# Patient Record
Sex: FEMALE | Race: ASIAN | Employment: OTHER | URBAN - METROPOLITAN AREA
[De-identification: names, ages, dates, MRNs, and addresses within clinical notes are randomized per-mention and may not be internally consistent; named-entity substitution may affect disease eponyms.]

---

## 2017-01-04 ENCOUNTER — OFFICE VISIT (OUTPATIENT)
Dept: PHARMACY | Facility: CLINIC | Age: 72
End: 2017-01-04
Payer: COMMERCIAL

## 2017-01-04 ENCOUNTER — OFFICE VISIT (OUTPATIENT)
Dept: FAMILY MEDICINE | Facility: CLINIC | Age: 72
End: 2017-01-04
Payer: COMMERCIAL

## 2017-01-04 VITALS — OXYGEN SATURATION: 99 % | SYSTOLIC BLOOD PRESSURE: 110 MMHG | HEART RATE: 65 BPM | DIASTOLIC BLOOD PRESSURE: 58 MMHG

## 2017-01-04 DIAGNOSIS — Z94.0 KIDNEY REPLACED BY TRANSPLANT: ICD-10-CM

## 2017-01-04 DIAGNOSIS — E66.01 MORBID OBESITY, UNSPECIFIED OBESITY TYPE (H): Primary | ICD-10-CM

## 2017-01-04 DIAGNOSIS — N39.0 RECURRENT UTI: ICD-10-CM

## 2017-01-04 DIAGNOSIS — G89.29 CHRONIC SHOULDER PAIN, UNSPECIFIED LATERALITY: ICD-10-CM

## 2017-01-04 DIAGNOSIS — M25.519 CHRONIC SHOULDER PAIN, UNSPECIFIED LATERALITY: ICD-10-CM

## 2017-01-04 DIAGNOSIS — E11.42 DIABETIC POLYNEUROPATHY ASSOCIATED WITH TYPE 2 DIABETES MELLITUS (H): ICD-10-CM

## 2017-01-04 DIAGNOSIS — E11.42 TYPE 2 DIABETES MELLITUS WITH DIABETIC POLYNEUROPATHY, WITH LONG-TERM CURRENT USE OF INSULIN (H): Primary | ICD-10-CM

## 2017-01-04 DIAGNOSIS — I10 BENIGN ESSENTIAL HYPERTENSION: ICD-10-CM

## 2017-01-04 DIAGNOSIS — E78.5 HYPERLIPIDEMIA LDL GOAL <100: ICD-10-CM

## 2017-01-04 DIAGNOSIS — Z79.4 TYPE 2 DIABETES MELLITUS WITH DIABETIC POLYNEUROPATHY, WITH LONG-TERM CURRENT USE OF INSULIN (H): Primary | ICD-10-CM

## 2017-01-04 PROCEDURE — 99350 HOME/RES VST EST HIGH MDM 60: CPT | Performed by: NURSE PRACTITIONER

## 2017-01-04 PROCEDURE — 99606 MTMS BY PHARM EST 15 MIN: CPT | Performed by: PHARMACIST

## 2017-01-04 PROCEDURE — 99607 MTMS BY PHARM ADDL 15 MIN: CPT | Performed by: PHARMACIST

## 2017-01-04 NOTE — MR AVS SNAPSHOT
After Visit Summary   1/4/2017    Gem Jade    MRN: [id number]           Patient Information     Date Of Birth          1945        Visit Information        Provider Department      1/4/2017 2:00 PM Marivel Barcenas APRN CNP [facility]        Today's Diagnoses     Morbid obesity, unspecified obesity type (H)    -  1     Diabetic polyneuropathy associated with type 2 diabetes mellitus (H)         Chronic shoulder pain, unspecified laterality         Benign essential hypertension         Recurrent UTI         Kidney replaced by transplant           Care Instructions    Please remember to take your 5U of short acting insulin before dinner     Please continue to work on your diet     Continue your bed exercises as tolerated     We will see you again in one month         Follow-ups after your visit        Follow-up notes from your care team     Return in about 1 month (around 2/4/2017).      Your next 10 appointments already scheduled     Feb 01, 2017  2:00 PM   Return Visit with ANTHONY Antonio CNP   [facility] ([facility]    [address] [place] [address]  [address] [place] [id number] [phone]            Feb 01, 2017  2:00 PM   Office Visit with Conchita Toledo [facility] ([facility])    [address] [place] [id number] [phone]           Bring a current list of meds and any records pertaining to this visit.  For Physicals, please bring immunization records and any forms needing to be filled out.  Please arrive 10 minutes early to complete paperwork.            Mar 01, 2017  2:00 PM   Return Visit with ANTHONY Antonio CNP   [facility] ([facility]    [address] [place] [address]  [address] [place] [id number] [phone]              Who to contact     If you have  "questions or need follow up information about today's clinic visit or your schedule please contact [facility] directly at [phone].  Normal or non-critical lab and imaging results will be communicated to you by [employer], letter or phone within 4 business days after the clinic has received the results. If you do not hear from us within 7 days, please contact the clinic through [employer] or phone. If you have a critical or abnormal lab result, we will notify you by phone as soon as possible.  Submit refill requests through [employer] or call your pharmacy and they will forward the refill request to us. Please allow 3 business days for your refill to be completed.          Additional Information About Your Visit        [employer] Information     [employer] lets you send messages to your doctor, view your test results, renew your prescriptions, schedule appointments and more. To sign up, go to [email or web address] . Click on \"Log in\" on the left side of the screen, which will take you to the Welcome page. Then click on \"Sign up Now\" on the right side of the page.     You will be asked to enter the access code listed below, as well as some personal information. Please follow the directions to create your username and password.     Your access code is: [email or web address]  Expires: 2017  8:50 AM     Your access code will  in 90 days. If you need help or a new code, please call your [place] clinic or [phone].        Care EveryWhere ID     This is your Care EveryWhere ID. This could be used by other organizations to access your [facility] medical records  [id number]        Your Vitals Were     Pulse Pulse Oximetry                65 99%           Blood Pressure from Last 3 Encounters:   17 110/58   16 110/70   10/19/16 110/58    Weight from Last 3 Encounters:   16 201 lb 8 oz (91.4 kg)   16 200 lb (90.719 kg)   16 198 lb (89.812 kg)              Today, you had the following "     No orders found for display       Primary Care Provider Office Phone # Fax #    ANTHONY Antonio [facility] [phone]       [facility] [address] [place], [place] [address]E [facility] [address] [place] [id number]        Thank you!     Thank you for choosing [facility]  for your care. Our goal is always to provide you with excellent care. Hearing back from our patients is one way we can continue to improve our services. Please take a few minutes to complete the written survey that you may receive in the mail after your visit with us. Thank you!             Your Updated Medication List - Protect others around you: Learn how to safely use, store and throw away your medicines at www.disposemymeds.org.          This list is accurate as of: 1/4/17 11:59 PM.  Always use your most recent med list.                   Brand Name Dispense Instructions for use    * ACE NOT PRESCRIBED (INTENTIONAL)      1 each continuous prn ACE Inhibitor not prescribed due to Worsening renal function on ACE/ARB therapy       ACETAMINOPHEN PO      Take 325-650 mg by mouth every 4 hours as needed.       alcohol swab prep pads     1 Box    Use to swab area of injection/yvon and vials as directed.       ASPIRIN PO      Take 81 mg by mouth daily.       benzonatate 100 MG capsule    TESSALON    50 capsule    Take 1 capsule (100 mg) by mouth 3 times daily as needed for cough       blood glucose monitoring test strip    ACCU-CHEK SMARTVIEW    1 Box    Use to test blood sugar 3 times daily or as directed.       calcium-vitamin D 600-400 MG-UNIT per tablet    CALTRATE     Take 1 tablet by mouth 2 times daily       cloNIDine 0.1 MG tablet    CATAPRES    180 tablet    Take 1 tablet (0.1 mg) by mouth 2 times daily       clotrimazole 1 % cream    LOTRIMIN    40 g    Apply topically 2 times daily Apply to buttocks twice a day until rash resolved.       Cranberry 400 MG Tabs      Take 400 mg by mouth 2 times daily        cyanocobalamin 1000 MCG/ML injection    VITAMIN B12    3 mL    Inject 1 mL (1,000 mcg) into the muscle every 30 days       famotidine 20 MG tablet    PEPCID    60 tablet    Take 1 tablet (20 mg) by mouth 2 times daily       gabapentin 300 MG capsule    NEURONTIN    540 capsule    Take 3 tabs 2x daily       GLUCAGON EMERGENCY 1 MG kit   Generic drug:  glucagon     1 each    Inject 1 mg into the muscle once       insulin glargine 100 UNIT/ML injection    LANTUS SOLOSTAR    15 mL    Inject 14 units under the skin daily.       insulin pen needle 30G X 8 MM     100 each    Use as directed with Lantus       labetalol 200 MG tablet    NORMODYNE    180 tablet    Take 1 tablet (200 mg) by mouth 2 times daily       lactobacillus rhamnosus (GG) capsule     60 capsule    Take 1 capsule by mouth 2 times daily       levothyroxine 50 MCG tablet    SYNTHROID    90 tablet    Take 1 tablet (50 mcg) by mouth daily       loperamide 2 MG capsule    IMODIUM    60 capsule    Take 1 capsule (2 mg) by mouth 3 times daily as needed for diarrhea       loratadine 10 MG tablet    CLARITIN    90 tablet    Take 1 tablet (10 mg) by mouth daily       NovoLOG FLEXPEN 100 UNIT/ML injection   Generic drug:  insulin aspart     15 mL    Inject 5 units before meals BID. At bedtime use following sliding scale:  150-200 5u 201-250 7u 251-300 9u 301-350 11u 351+ 13u       NYSTOP 903402 UNIT/GM Powd powder     60 g    Externally apply 1 g topically 3 times daily as needed       ondansetron 4 MG ODT tab    ZOFRAN ODT    20 tablet    Take 1-2 tablets (4-8 mg) by mouth every 8 hours as needed for nausea       * order for DME     3 catheter    Equipment being ordered: [employer] Catheters - 18 french       * order for DME     100 pad    Equipment being ordered: [employer]ux Please send to [facility]       * order for DME     1 each    Equipment being ordered: [employer] FX CPAP interface (nasal pillows), size XS.       OXcarbazepine 300 MG tablet    TRILEPTAL    60 tablet  "   Take 1 tablet (300 mg) by mouth 2 times daily       phenazopyridine 100 MG tablet    PYRIDIUM    6 tablet    Take 1 tablet (100 mg) by mouth 3 times daily as needed for irritation       pravastatin 10 MG tablet    PRAVACHOL    90 tablet    Take 1 tablet (10 mg) by mouth daily       predniSONE 2.5 MG tablet    DELTASONE    30 tablet    Take 1 tablet (2.5 mg) by mouth daily       simethicone 125 MG Chew chewable tablet    MYLICON    120 tablet    Take 1 tablet (125 mg) by mouth as needed for intestinal gas       * sirolimus Tabs tablet     30 tablet    Take 1 tablet (1 mg) by mouth daily GENERIC (total dose 1.5 mg daily)       * sirolimus Tabs tablet     30 tablet    Take 1 tablet (0.5 mg) by mouth daily GENERIC (1.5 mg daily)       syringe/needle (disp) 22G X 1-1/2\" 3 ML Misc    B-D SYRINGE/NEEDLE    50 each    Inject 1 Syringe into the muscle every 30 days       * tacrolimus 0.5 MG capsule    PROGRAF - GENERIC EQUIVALENT    60 capsule    HOLD       * tacrolimus 1 MG capsule    PROGRAF - GENERIC EQUIVALENT    120 capsule    Take 2 capsules (2 mg) by mouth 2 times daily       TraMADol HCl 50 MG Tbdp     90 tablet    Take 50 mg by mouth 3 times daily as needed       vitamin D 2000 UNITS tablet     100 tablet    Take 4,000 Units by mouth daily       * Notice:  This list has 8 medication(s) that are the same as other medications prescribed for you. Read the directions carefully, and ask your doctor or other care provider to review them with you.      "

## 2017-01-04 NOTE — PROGRESS NOTES
SUBJECTIVE/OBJECTIVE:                                                    Gem Jade is a 71 year old female seen at their home for a follow-up visit for Medication Therapy Management.  She was referred to me from Marivel Barcenas. Seen in a covisit with PCP.    Chief Complaint: Follow up from our visit on 11/16/16.  Catheter leaking (see PCP note)  Tobacco: History of tobacco dependence - quit     Alcohol: not currently using    Medication Adherence: no issues reported and has assistance setting up med boxes (Keshawn)    Diabetes:  Pt currently taking Lantus 14u daily, Novolog 5u for every 50 over 150. Uses 7-11u 1 time a day. Has not remembered to give Novolog 5u with meals.  SMBG: one time daily after eating and prior to Novolog.  Ranges (glucometer):   Date FBG/ 2hours post Lunch/2hours post Dinner /2hours post bedtime   today       yesterday    439       236       263       289       289       308      Symptoms of low blood sugar? none. Frequency of  hypoglycemia? rarely.  Recent symptoms of high blood sugar? none  Aspirin: Taking 81mg daily and denies side effects  Diet/Exercise: Appetite has improved, has advanced to her regular diet. Has been feeling good. Eating more sweets over the holidays. Usually eating dinner as main meal and snacks, not eating breakfast often. Wakes up at 12pm usually.  Hx kidney transplant: currently taking Prograf 2mg BID, prednisone 2.5mg daily, Rapamune 1.5mg daily. Blood levels have been regularly followed by [facility] transplant clinic and Keshawn appears to be following changes in dosing instructions correctly. Denies SE.  Hypertension: Current medications include labetalol 200mg BID and clonidine 0.1mg BID.  Patient has BP monitored by [facility]. States BP has been consistently 115-120 systolic and 80s diastolic.  Patient reports no current medication side effects.  Hyperlipidemia: Current therapy includes pravastatin 10mg once daily.  Pt reports no significant myalgias or other  side effects. Has history of severe myalgias with atorvastatin.   Shoulder pain: currently taking tramadol once a day which is working for shoulder pain. Also using Icy Hot which is helpful.     Current labs include:  BP Readings from Last 3 Encounters:   01/04/17 110/58   11/16/16 110/70   10/19/16 110/58     A1C      7.4   12/5/2016.  CHOL      164   12/5/2016  TRIG      272   12/5/2016  HDL       46   12/5/2016  LDL       64   12/5/2016    Liver Function Studies -   Recent Labs   Lab Test  09/05/16   0617   PROTTOTAL  7.3   ALBUMIN  2.6*   BILITOTAL  0.9   ALKPHOS  53   AST  15   ALT  10       Lab Results   Component Value Date    UCRR 42 03/18/2014    MICROL 68 06/07/2013    UMALCR 194.29* 06/07/2013       Last Basic Metabolic Panel:  NA      138   11/14/2016   POTASSIUM      4.1   11/14/2016  CHLORIDE      102   11/14/2016  BUN       22   11/14/2016  CR     0.86   11/14/2016  GFR ESTIMATE   Date Value Ref Range Status   11/14/2016 66 >60 mL/min/1.7m2 Final     Comment:     Non  GFR Calc   10/17/2016 63 >60 mL/min/1.7m2 Final     Comment:     Non  GFR Calc   10/10/2016 73 >60 mL/min/1.7m2 Final     Comment:     Non  GFR Calc     GFR ESTIMATE IF BLACK   Date Value Ref Range Status   11/14/2016 79 >60 mL/min/1.7m2 Final     Comment:      GFR Calc   10/17/2016 76 >60 mL/min/1.7m2 Final     Comment:      GFR Calc   10/10/2016 88 >60 mL/min/1.7m2 Final     Comment:      GFR Calc     TSH   Date Value Ref Range Status   08/30/2016 1.01 0.40 - 4.00 mU/L Final   ]    Most Recent Immunizations   Administered Date(s) Administered     Hepatitis B 01/13/2003     Influenza (H1N1) 11/25/2009     Influenza (High Dose) 3 valent vaccine 10/07/2016     Influenza (IIV3) 11/05/2011     Influenza Vaccine IM 3yrs+ 4 Valent IIV4 10/16/2013     Pneumococcal (PCV 13) 12/01/2015     Pneumococcal 23 valent 06/17/2013     TDAP (ADACEL AGES 11-64)  06/17/2013     Zoster vaccine, live 06/17/2013     ASSESSMENT:                                                    Current medications were reviewed today.      Medication Adherence: needs improvement - see below    Diabetes: needs improvement. A1c at goal <8%. PPGs not at goal <180. Pt would benefit from taking Novolog 5 units consistently with dinner. Also would be helpful to have fasting BS readings to assess need for Lantus dose changes.  Hx kidney transplant: stable. Continue to follow-up with transplant clinic.  Hypertension: stable. BP at goal <140/90.  Hyperlipidemia: stable. Pt is not on a moderate-intensity statin but has a history of tolerating statins at higher doses. LDL is less than 70, will continue with low intensity statin.  Shoulder pain: stable     PLAN:                                                      Check blood sugar fasting before eating  Take Novolog 5 units with dinner    I spent 50 minutes with this patient today.  All changes were made via collaborative practice agreement with Marivel Barcenas. A copy of the visit note was provided to the patient's primary care provider.     Will follow up in 1 month.    The patient was mailed a summary of these recommendations as an after visit summary.    Conchita Toledo, PharmD, BCACP

## 2017-01-09 DIAGNOSIS — Z79.60 LONG-TERM USE OF IMMUNOSUPPRESSANT MEDICATION: ICD-10-CM

## 2017-01-09 DIAGNOSIS — Z94.0 KIDNEY TRANSPLANT RECIPIENT: ICD-10-CM

## 2017-01-09 DIAGNOSIS — D84.9 IMMUNOSUPPRESSED STATUS (H): ICD-10-CM

## 2017-01-09 LAB
ANION GAP SERPL CALCULATED.3IONS-SCNC: 8 MMOL/L (ref 3–14)
BUN SERPL-MCNC: 42 MG/DL (ref 7–30)
CALCIUM SERPL-MCNC: 9.5 MG/DL (ref 8.5–10.1)
CHLORIDE SERPL-SCNC: 104 MMOL/L (ref 94–109)
CO2 SERPL-SCNC: 25 MMOL/L (ref 20–32)
CREAT SERPL-MCNC: 1.37 MG/DL (ref 0.52–1.04)
ERYTHROCYTE [DISTWIDTH] IN BLOOD BY AUTOMATED COUNT: 13.5 % (ref 10–15)
GFR SERPL CREATININE-BSD FRML MDRD: 38 ML/MIN/1.7M2
GLUCOSE SERPL-MCNC: 150 MG/DL (ref 70–99)
HCT VFR BLD AUTO: 34.8 % (ref 35–47)
HGB BLD-MCNC: 11 G/DL (ref 11.7–15.7)
MCH RBC QN AUTO: 29.9 PG (ref 26.5–33)
MCHC RBC AUTO-ENTMCNC: 31.6 G/DL (ref 31.5–36.5)
MCV RBC AUTO: 95 FL (ref 78–100)
PLATELET # BLD AUTO: 109 10E9/L (ref 150–450)
POTASSIUM SERPL-SCNC: 4.6 MMOL/L (ref 3.4–5.3)
RBC # BLD AUTO: 3.68 10E12/L (ref 3.8–5.2)
SODIUM SERPL-SCNC: 137 MMOL/L (ref 133–144)
WBC # BLD AUTO: 4 10E9/L (ref 4–11)

## 2017-01-09 PROCEDURE — 85027 COMPLETE CBC AUTOMATED: CPT | Performed by: FAMILY MEDICINE

## 2017-01-09 PROCEDURE — 99000 SPECIMEN HANDLING OFFICE-LAB: CPT | Performed by: FAMILY MEDICINE

## 2017-01-09 PROCEDURE — 36415 COLL VENOUS BLD VENIPUNCTURE: CPT | Performed by: FAMILY MEDICINE

## 2017-01-09 PROCEDURE — 80048 BASIC METABOLIC PNL TOTAL CA: CPT | Performed by: FAMILY MEDICINE

## 2017-01-09 PROCEDURE — 80195 ASSAY OF SIROLIMUS: CPT | Mod: 90 | Performed by: FAMILY MEDICINE

## 2017-01-09 PROCEDURE — 80197 ASSAY OF TACROLIMUS: CPT | Mod: 90 | Performed by: FAMILY MEDICINE

## 2017-01-09 NOTE — PATIENT INSTRUCTIONS
Please remember to take your 5U of short acting insulin before dinner     Please continue to work on your diet     Continue your bed exercises as tolerated     We will see you again in one month

## 2017-01-09 NOTE — PROGRESS NOTES
SUBJECTIVE:                                                    Gem Jade is a 68 year old female who is seen in her home due to inability to leave the home unassisted, morbid obesity, severe vertigo, generalized weakness, chronic pain, bed bound, rarely able to get up in the , seen today for the following health issues: Seen with  , and PharmD, recent hospitalization 9/9/16 for , Pancytopenia.  2. Acute encephalopathy.  3. Acute kidney injury, kidney transplant team involved and med changes made    KIDNEY TRANSPLANT      Duration: right kidney replaced, left one non functional     Description (location/character/radiation): chronic managed by kidney transplant team     Intensity:  moderate    Accompanying signs and symptoms: fatigue,   History (similar episodes/previous evaluation):   Progress Notes      Jorgito Jacinto RN at 11/15/2016  4:21 PM      Status: Signed         Expand All Collapse All    ISSUE:  - tacrolimus level <3.0, target 3-5  - sirolimus level 6.2, target 4-6  - tacrolimus + sirolimus = 8    PLAN:  - increase tacrolimus dose from 1.5 mg twice daily to 2 mg twice daily  - decrease sirolimus dose from 2 mg daily to 1.5 mg daily                     Precipitating or alleviating factors: DM, bed bound     Therapies tried and outcome: see transplant team notes, no med changes since 11/16         Diabetes Follow-up    Patient is checking blood sugars: once daily.  Results are as follows: 200-400's         noon- fasting, not taking non fasting sugars     Diabetic concerns: frequent infections and other -  elevated evening sugars, not checking non  fasting sugars, taking SS novolog, and PharmD is now having her take 5U at dinner,  and the SS scale with her other meals and lantus 14u now at bedtime, only eating 1x a day eating a lot beatrice cookies and cheesecake but no more Ramen          Symptoms of hypoglycemia (low blood sugar): shaky, weak, lethargy, confusion, Symptoms occur if  sugars < below 50.     Paresthesias (numbness or burning in feet) or sores: Yes hands and feet, using gabapentin with good relief,     Date of last diabetic eye exam: unsure                Amount of exercise or physical activity: None, bed bound, no longer getting up in her wheel chair, worked with PT on core strengthening and turning without using arms, able to turn herself now but now having shoulder pain again so has pulled back on turning as much with her arms      Problems taking medications regularly: No,  set them up and adm them    Medication side effects: none  Diet: diabetic, appetite has decreased and only eating 1x a day, eating more dessert lately due to the [employer]       Musculoskeletal problem/pain      Duration: Chronic, but stable with stopping baclofen, shoulder pain is a little worse now due to turning herself     Description  Location: shoulders, left the worse     Intensity:  Moderate,2-7 /10    Accompanying signs and symptoms: numbness and weakness of in UE    History  Previous similar problem: YES  Previous evaluation:  x-ray, MRI and orthopedic evaluation  RIGHT SHOULDER THREE VIEWS 5/19/2013 2:23 PM  HISTORY: Rule out fracture.  COMPARISON: 9/14/2012.  FINDINGS: Again seen are degenerative changes of the  acromioclavicular joint. The right shoulder is otherwise unremarkable  and unchanged.    Precipitating or alleviating factors:  Trauma or overuse: YES- uses her arms to turn herself in bed  Aggravating factors include: any use of UE  Therapies tried and outcome: no longer using acetaminophen, using icy hot gel and tramadol 1x daily, working on core strength, still using chinese patches and working well            Hypertension Follow-up      Outpatient blood pressures are being checked by [facility], stable    Low Salt Diet: not monitoring salt         Diarrhea  Duration of complaint: no incidents of diarrhea now, had it for few days after starting Siroilmus but nothing now for several  months      UTI - Female  Duration of complaint: increase in leaking around the cath, changed last night at 2am, leaking improved but now is bad again, TC to [facility] and they will be out at 4pm today to change it again,        Problem list and histories reviewed & adjusted, as indicated.  Additional history: as documented    Recent Labs   Lab Test  12/05/16   1210  11/14/16   1200  10/17/16   1000   09/05/16   0617  09/04/16   0815   08/30/16   1709   08/30/16   0032   06/13/16   1140   05/26/16   0744   12/20/15   0853   02/05/15   1250   05/16/13   1240   A1C  7.4*   --    --    --    --    --    --    --    --    --    --   6.7*   --    --    --   5.5   < >  6.9*   < >  7.2*   LDL  64   --    --    --    --    --    --    --    --    --    --    --    --    --    --    --    --   18   --   149*   HDL  46*   --    --    --    --    --    --    --    --    --    --    --    --    --    --    --    --   43*   --   42*   TRIG  272*   --    --    --    --    --    --    --    --    --    --    --    --    --    --    --    --   223*   --   262*   ALT   --    --    --    --   10  10   --   8   --   6   < >   --    --    --    < >   --    < >   --    < >   --    CR   --   0.86  0.89   < >  0.54  0.58   < >  1.38*   < >   --    < >   --    < >  0.70   < >  0.74   < >  0.67   < >  0.70   GFRESTIMATED   --   66  63   < >  >90  Non  GFR Calc    >90  Non  GFR Calc     < >  38*   < >   --    < >   --    < >  83   < >  78   < >  87   < >  84   GFRESTBLACK   --   79  76   < >  >90   GFR Calc    >90   GFR Calc     < >  46*   < >   --    < >   --    < >  >90   GFR Calc     < >  >90   GFR Calc     < >  >90   GFR Calc     < >  >90   POTASSIUM   --   4.1  4.4   < >  4.0  4.0   < >  4.3   < >   --    < >   --    < >  3.5   < >  3.3*   < >  4.3   < >  3.9   TSH   --    --    --    --    --    --    --    --    --   1.01   --     --    --   0.96   --    --    --    --    < >   --     < > = values in this interval not displayed.      BP Readings from Last 3 Encounters:   01/04/17 110/58   11/16/16 110/70   10/19/16 110/58    Wt Readings from Last 3 Encounters:   09/03/16 201 lb 8 oz (91.4 kg)   08/09/16 200 lb (90.719 kg)   06/22/16 198 lb (89.812 kg)            Labs reviewed in [facility]  Problem list, Medication list, Allergies, and Medical/Social/Surgical histories reviewed in [facility] and updated as appropriate.    ROS:  CONSTITUTIONAL:NEGATIVE for fever, chills, change in weight and fatigue  INTEGUMENTARY/SKIN: NEGATIVE for worrisome rashes, moles or lesions  R: NEGATIVE for significant cough or SOB  CV: NEGATIVE for chest pain, palpitations or peripheral edema  MUSCULOSKELETAL: POSITIVE  for muscle weakness generalized and hx of  shoulder and back pain  PSYCHIATRIC: NEGATIVE for concentration difficulty, Hx anxiety, fatigue, impaired memory but back to baseline    OBJECTIVE:                                                    /58 mmHg  Pulse 65  SpO2 99%  There is no weight on file to calculate BMI.  GENERAL: alert, mild distress, obese, fatigued and laying in bed, engaged in interview,   RESP: lungs clear to auscultation - no rales, rhonchi or wheezes  CV: regular rate and rhythm, normal S1 S2, no S3 or S4, no murmur, click or rub, no peripheral edema  ABDOMEN: soft, nontender, no masses and bowel sounds diminished  MS: no gross musculoskeletal defects noted, no edema, limited ROM in both shoulders  SKIN: no suspicious lesions or rashes  NEURO: Decreased strength and tone, mentation intact and speech normal  PSYCH: mentation appears normal, affect normal/bright, very verbose    Diagnostic Test Results:  Results for orders placed or performed in visit on 12/05/16   Hemoglobin A1c   Result Value Ref Range    Hemoglobin A1C 7.4 (H) 4.3 - 6.0 %   Lipid panel reflex to direct LDL   Result Value Ref Range    Cholesterol 164 <200 mg/dL     Triglycerides 272 (H) <150 mg/dL    HDL Cholesterol 46 (L) >49 mg/dL    LDL Cholesterol Calculated 64 <100 mg/dL    Non HDL Cholesterol 118 <130 mg/dL     *Note: Due to a large number of results and/or encounters for the requested time period, some results have not been displayed. A complete set of results can be found in Results Review.        ASSESSMENT/PLAN:                                                    ASSESSMENT / PLAN:  (E66.01) Morbid obesity, unspecified obesity type (H)  (primary encounter diagnosis)  Comment: improved but still not enough core strength to get up in her chair   Plan: discussed diet and exercise     (E11.42) Diabetic polyneuropathy associated with type 2 diabetes mellitus (H)  Comment: RBG worse due to diet and not taking 5U Novolog  at dinner time   Plan: will work on diet and take her 5U regularly before dinner     (M25.519,  G89.29) Chronic shoulder pain, unspecified laterality  Comment: slight increase but has not had to increase her pain meds  Plan: continue tramadol 1x daily and icy hot and chinese patches     (I10) Benign essential hypertension  Comment: stable   Plan: no med changes     (N39.0) Recurrent UTI  Comment: troubles with leaking cath but no other UTI sx  Plan: [facility] will come and replace cath today    (Z94.0) Kidney replaced by transplant  Comment: stable on meds   Plan: follow up with transplant team     Patient Instructions   Please remember to take your 5U of short acting insulin before dinner     Please continue to work on your diet     Continue your bed exercises as tolerated     We will see you again in one month       Marivel Barcenas, ALISON, APRN CNP  [facility]    Visit time 60  min with > than 50% of the time spent in counseling on: care coordination, DM, morbid obesity, HTN, shoulder pain, kidney transplant,

## 2017-01-10 LAB
SIROLIMUS BLD-MCNC: 4 UG/L (ref 5–15)
TACROLIMUS BLD-MCNC: 3.6 UG/L (ref 5–15)
TME LAST DOSE: ABNORMAL H
TME LAST DOSE: ABNORMAL H

## 2017-01-10 PROCEDURE — G0179 MD RECERTIFICATION HHA PT: HCPCS

## 2017-01-13 DIAGNOSIS — E08.42 DIABETIC POLYNEUROPATHY ASSOCIATED WITH DIABETES MELLITUS DUE TO UNDERLYING CONDITION (H): Primary | ICD-10-CM

## 2017-01-13 RX ORDER — OXCARBAZEPINE 300 MG/1
300 TABLET, FILM COATED ORAL 2 TIMES DAILY
Qty: 180 TABLET | Refills: 3 | Status: ON HOLD | OUTPATIENT
Start: 2017-01-13 | End: 2017-04-28

## 2017-01-13 NOTE — TELEPHONE ENCOUNTER
Oxcarbazepine 300mg      Last Written Prescription Date:  12/06/16  Last Fill Quantity: 60,   # refills: 0  Last Office Visit with [facility], [facility] or [facility] prescribing provider: 01/04/17  Future Office visit:    Next 5 appointments (look out 90 days)     Feb 01, 2017  2:00 PM   [facility] Visit with ANTHONY Antonio CNP   [facility]    [address] [place] [address]  [address] [place] [id number] [phone]            Feb 01, 2017  2:00 PM   Office Visit with Conchita Toledo [facility] ([facility])    [address] [place] [address] [address] [place] [id number] [phone]            Mar 01, 2017  2:00 PM   Return Visit with ANTHONY Antonio CNP   [facility]    [address] [place] [address]  [address]  [facility] [id number] [phone]                   Routing refill request to provider for review/approval because:  Drug not on the [facility], [facility] or [facility] refill protocol or controlled substance

## 2017-01-13 NOTE — TELEPHONE ENCOUNTER
Routing refill request to provider for review/approval because:  Drug not on the [facility] refill protocol     Taqueria Araujo RN

## 2017-01-31 NOTE — PROGRESS NOTES
SUBJECTIVE/OBJECTIVE:                                                    Gem Jade is a 71 year old female seen at their home for a follow-up visit for Medication Therapy Management.  She was referred to me from Marivel Barcenas. Seen as a covisit with PCP.     Chief Complaint: Follow up from our visit on 1/4/2017.   Tobacco: History of tobacco dependence - quit   Alcohol: not currently using    Medication Adherence: issues found and discussed below and has assistance setting up med boxes (Keshawn)    Diabetes:  Pt currently taking Lantus 14 units daily, Novolog 5 units with meals and sliding scale 2 units for every 50 over 201. Has been remembering to use Novolog (1-2 times a day) with meals most of the time now! (missing a couple times a week).   SMBG: two times daily.  Pt would like drum-style test strips for better ease of use. Ranges (based on glucometer readings):   Date FBG/ 2hours post Lunch/2hours post Dinner /2hours post    2/1 188      1/31 191  /98       /319       /382       /274       /304       /287       Symptoms of low blood sugar? none. Frequency of hypoglycemia? never.  Recent symptoms of high blood sugar? none  Pt is not taking an ACEi/ARB. (worsening renal function when on ACE/ARB therapy)  Aspirin: Taking 81mg daily and denies side effects  Diet/exercise: has been eating 1-2 times a day, appetite is normal. She is bed bound, no exercise.    Hx kidney transplant: currently taking Prograf 2mg BID, prednisone 2.5mg daily, Rapamune 1.5mg daily. Blood levels have been regularly followed by [facility] transplant clinic. Denies SE.  Keshawn stated he had been told that immunosuppressants when used long-term can affect cognition and brain size. Keshawn is also wondering how the current IS regimen compares to her previous IS regimen with regard to effectiveness.      Shoulder pain: currently taking tramadol twice a day which is working for shoulder pain. Also using Icy Hot which is helpful.      Neuropathy: has not been taking any oxcarbazepine in about 1 week inadvertently, Keshawn not aware that Rx was not in pill box. She has noticed an increase in neuropathic pain over the past week.      Current labs include:  BP Readings from Last 3 Encounters:   02/01/17 130/80   01/04/17 110/58   11/16/16 110/70     A1C      7.4   12/5/2016.  CHOL      164   12/5/2016  TRIG      272   12/5/2016  HDL       46   12/5/2016  LDL       64   12/5/2016    Liver Function Studies -   Recent Labs   Lab Test  09/05/16   0617   PROTTOTAL  7.3   ALBUMIN  2.6*   BILITOTAL  0.9   ALKPHOS  53   AST  15   ALT  10       Lab Results   Component Value Date    UCRR 42 03/18/2014    MICROL 68 06/07/2013    UMALCR 194.29* 06/07/2013       Last Basic Metabolic Panel:  NA      137   1/9/2017   POTASSIUM      4.6   1/9/2017  CHLORIDE      104   1/9/2017  BUN       42   1/9/2017  CR     1.37   1/9/2017  GFR ESTIMATE   Date Value Ref Range Status   01/09/2017 38* >60 mL/min/1.7m2 Final     Comment:     Non  GFR Calc   11/14/2016 66 >60 mL/min/1.7m2 Final     Comment:     Non  GFR Calc   10/17/2016 63 >60 mL/min/1.7m2 Final     Comment:     Non  GFR Calc     GFR ESTIMATE IF BLACK   Date Value Ref Range Status   01/09/2017 46* >60 mL/min/1.7m2 Final     Comment:      GFR Calc   11/14/2016 79 >60 mL/min/1.7m2 Final     Comment:      GFR Calc   10/17/2016 76 >60 mL/min/1.7m2 Final     Comment:      GFR Calc     TSH   Date Value Ref Range Status   08/30/2016 1.01 0.40 - 4.00 mU/L Final   ]    Most Recent Immunizations   Administered Date(s) Administered     Hepatitis B 01/13/2003     Influenza (H1N1) 11/25/2009     Influenza (High Dose) 3 valent vaccine 10/07/2016     Influenza (IIV3) 11/05/2011     Influenza Vaccine IM 3yrs+ 4 Valent IIV4 10/16/2013     Pneumococcal (PCV 13) 12/01/2015     Pneumococcal 23 valent 06/17/2013     TDAP (ADACEL AGES 11-64)  06/17/2013     Zoster vaccine, live 06/17/2013     ASSESSMENT:                                                    Current medications were reviewed today as discussed above.      Medication Adherence: needs improvement - see below    Diabetes:  Needs improvement.  A1c at goal <8%. PPGs not at goal <180. Increasing Pt would benefit from increasing Novolog. Sent Accu-Chek Compact plus glucometer to pharmacy.    Hx kidney transplant:  Stable. Pt is followed by [facility] transplant clinic, will follow-up with the clinic for recheck of immunosuppressant levels.  Will plan on researching information regarding patient concerns of side effects as above.      Shoulder Pain: stable. Pt is satisfied with the level of pain control provided by current regimen. No changes warranted at this time.    Neuropathy: needs improvement. Pt reports being symptomatic in the past week while inadvertently not taking oxcarbazepine. Restarting this medication should resolve symptoms.     PLAN:                                                      1. Increase Novolog to 7 units with meals  2. Ordered Accu-Chek Compact Plus  3. Restart oxcarbazepine    I spent 60 minutes with this patient today  All changes were made via collaborative practice agreement with Marivel Barcenas. A copy of the visit note was provided to the patient's primary care provider.     Will follow up in 1 month.    The patient was mailed a summary of these recommendations as an after visit summary.    Jhonathan Lerner, Pharm.D. Student    Conchita Toledo, VivianD, BCACP

## 2017-02-01 ENCOUNTER — OFFICE VISIT (OUTPATIENT)
Dept: FAMILY MEDICINE | Facility: CLINIC | Age: 72
End: 2017-02-01
Payer: COMMERCIAL

## 2017-02-01 ENCOUNTER — OFFICE VISIT (OUTPATIENT)
Dept: PHARMACY | Facility: CLINIC | Age: 72
End: 2017-02-01
Payer: COMMERCIAL

## 2017-02-01 VITALS
OXYGEN SATURATION: 99 % | HEART RATE: 68 BPM | SYSTOLIC BLOOD PRESSURE: 130 MMHG | DIASTOLIC BLOOD PRESSURE: 80 MMHG | RESPIRATION RATE: 14 BRPM

## 2017-02-01 DIAGNOSIS — Z94.0 KIDNEY REPLACED BY TRANSPLANT: ICD-10-CM

## 2017-02-01 DIAGNOSIS — E11.42 DIABETIC POLYNEUROPATHY ASSOCIATED WITH TYPE 2 DIABETES MELLITUS (H): ICD-10-CM

## 2017-02-01 DIAGNOSIS — G89.29 CHRONIC SHOULDER PAIN, UNSPECIFIED LATERALITY: ICD-10-CM

## 2017-02-01 DIAGNOSIS — E11.42 TYPE 2 DIABETES MELLITUS WITH DIABETIC POLYNEUROPATHY, WITH LONG-TERM CURRENT USE OF INSULIN (H): ICD-10-CM

## 2017-02-01 DIAGNOSIS — Z79.4 TYPE 2 DIABETES MELLITUS WITH DIABETIC POLYNEUROPATHY, WITH LONG-TERM CURRENT USE OF INSULIN (H): ICD-10-CM

## 2017-02-01 DIAGNOSIS — E11.3299 DIABETES MELLITUS WITH BACKGROUND RETINOPATHY (H): Primary | ICD-10-CM

## 2017-02-01 DIAGNOSIS — I10 BENIGN ESSENTIAL HYPERTENSION: ICD-10-CM

## 2017-02-01 DIAGNOSIS — E66.01 MORBID OBESITY, UNSPECIFIED OBESITY TYPE (H): Primary | ICD-10-CM

## 2017-02-01 DIAGNOSIS — M25.519 CHRONIC SHOULDER PAIN, UNSPECIFIED LATERALITY: ICD-10-CM

## 2017-02-01 PROCEDURE — 99607 MTMS BY PHARM ADDL 15 MIN: CPT | Performed by: PHARMACIST

## 2017-02-01 PROCEDURE — 99350 HOME/RES VST EST HIGH MDM 60: CPT | Performed by: NURSE PRACTITIONER

## 2017-02-01 PROCEDURE — 99606 MTMS BY PHARM EST 15 MIN: CPT | Performed by: PHARMACIST

## 2017-02-01 NOTE — MR AVS SNAPSHOT
After Visit Summary   2/1/2017    Gem Jade    MRN: [id number]           Patient Information     Date Of Birth          1945        Visit Information        Provider Department      2/1/2017 12:15 PM Marivel Barcenas APRN CNP [facility]        Today's Diagnoses     Morbid obesity, unspecified obesity type (H)    -  1     Chronic shoulder pain, unspecified laterality         Benign essential hypertension         Kidney replaced by transplant         Diabetic polyneuropathy associated with type 2 diabetes mellitus (H)           Care Instructions    Please restart your Trileptal     Please increase your SS Novolog to 7u at dinner and with any other substantial meal     Continue to limit your simple carbs     Please remember to check some non fasting sugars     We will see you again in one month          Follow-ups after your visit        Follow-up notes from your care team     Return in about 1 month (around 3/1/2017).      Your next 10 appointments already scheduled     Mar 01, 2017  2:00 PM   [facility]Y [facility]   [facility]    [address] [place] [address]  [address]  [facility] [address] [phone]              Who to contact     If you have questions or need follow up information about today's clinic visit or your schedule please contact [facility] directly at [phone].  Normal or non-critical lab and imaging results will be communicated to you by MyChart, letter or phone within 4 business days after the clinic has received the results. If you do not hear from us within 7 days, please contact the clinic through MyChart or phone. If you have a critical or abnormal lab result, we will notify you by phone as soon as possible.  Submit refill requests through [employer] or call your pharmacy and they will forward the refill request to us. Please allow 3 business days  "for your refill to be completed.          Additional Information About Your Visit        [employer] Information     [employer] lets you send messages to your doctor, view your test results, renew your prescriptions, schedule appointments and more. To sign up, go to [email or web address] . Click on \"Log in\" on the left side of the screen, which will take you to the Welcome page. Then click on \"Sign up Now\" on the right side of the page.     You will be asked to enter the access code listed below, as well as some personal information. Please follow the directions to create your username and password.     Your access code is: [email or web address]  Expires: 2017  8:50 AM     Your access code will  in 90 days. If you need help or a new code, please call your [facility] clinic or [phone].        Care EveryWhere ID     This is your Care EveryWhere ID. This could be used by other organizations to access your [facility] medical records  [id number]        Your Vitals Were     Pulse Respirations Pulse Oximetry             68 14 99%          Blood Pressure from Last 3 Encounters:   17 130/80   17 110/58   16 110/70    Weight from Last 3 Encounters:   16 201 lb 8 oz (91.4 kg)   16 200 lb (90.719 kg)   16 198 lb (89.812 kg)              Today, you had the following     No orders found for display         Today's Medication Changes          These changes are accurate as of: 17 11:59 PM.  If you have any questions, ask your nurse or doctor.               Start taking these medicines.        Dose/Directions    blood glucose monitoring meter device kit   Used for:  Type 2 diabetes mellitus with diabetic polyneuropathy, with long-term current use of insulin (H)   Started by:  Conchita Toledo [facility]        Use to test blood sugar 3 times daily or as directed.   Quantity:  1 kit   Refills:  0         These medicines have changed or have updated prescriptions.        Dose/Directions    " NovoLOG FLEXPEN 100 UNIT/ML injection   This may have changed:  additional instructions   Used for:  Diabetes mellitus with background retinopathy (H)   Generic drug:  insulin aspart   Changed by:  Conchita Toledo [facility]        Inject 7 units before meals BID. At bedtime use following sliding scale:  150-200 5u 201-250 7u 251-300 9u 301-350 11u 351+ 13u   Quantity:  15 mL   Refills:  0            Where to get your medicines      These medications were sent to [facility] - [place], [place] - [address]  [address] [facility], [facility] [address]    Hours:  Mon-Fri 8:30am-5:00pm Toll Free [phone] Phone:  [phone]    - blood glucose monitoring meter device kit  - blood glucose monitoring test strip             Primary Care Provider Office Phone # Fax #    Marivel Salgado TommyangelikaElton APREILEEN [facility] [phone]       [facility] [address] [place] [address]  [facility] [address]        Thank you!     Thank you for choosing [facility]  for your care. Our goal is always to provide you with excellent care. Hearing back from our patients is one way we can continue to improve our services. Please take a few minutes to complete the written survey that you may receive in the mail after your visit with us. Thank you!             Your Updated Medication List - Protect others around you: Learn how to safely use, store and throw away your medicines at www.disposemymeds.org.          This list is accurate as of: 2/1/17 11:59 PM.  Always use your most recent med list.                   Brand Name Dispense Instructions for use    * ACE NOT PRESCRIBED (INTENTIONAL)      1 each continuous prn ACE Inhibitor not prescribed due to Worsening renal function on ACE/ARB therapy       ACETAMINOPHEN PO      Take 325-650 mg by mouth every 4 hours as needed.       alcohol swab prep pads     1 Box    Use to swab area of injection/yvon and vials as directed.        ASPIRIN PO      Take 81 mg by mouth daily.       benzonatate 100 MG capsule    TESSALON    50 capsule    Take 1 capsule (100 mg) by mouth 3 times daily as needed for cough       blood glucose monitoring meter device kit     1 kit    Use to test blood sugar 3 times daily or as directed.       blood glucose monitoring test strip    ACCU-CHEK COMPACT PLUS    100 strip    Use to test blood sugar 3 times daily or as directed.       calcium-vitamin D 600-400 MG-UNIT per tablet    CALTRATE     Take 1 tablet by mouth 2 times daily       cloNIDine 0.1 MG tablet    CATAPRES    180 tablet    Take 1 tablet (0.1 mg) by mouth 2 times daily       clotrimazole 1 % cream    LOTRIMIN    40 g    Apply topically 2 times daily Apply to buttocks twice a day until rash resolved.       Cranberry 400 MG Tabs      Take 400 mg by mouth 2 times daily       cyanocobalamin 1000 MCG/ML injection    VITAMIN B12    3 mL    Inject 1 mL (1,000 mcg) into the muscle every 30 days       famotidine 20 MG tablet    PEPCID    60 tablet    Take 1 tablet (20 mg) by mouth 2 times daily       gabapentin 300 MG capsule    NEURONTIN    540 capsule    Take 3 tabs 2x daily       GLUCAGON EMERGENCY 1 MG kit   Generic drug:  glucagon     1 each    Inject 1 mg into the muscle once       insulin glargine 100 UNIT/ML injection    LANTUS SOLOSTAR    15 mL    Inject 14 units under the skin daily.       insulin pen needle 30G X 8 MM     100 each    Use as directed with Lantus       labetalol 200 MG tablet    NORMODYNE    180 tablet    Take 1 tablet (200 mg) by mouth 2 times daily       lactobacillus rhamnosus (GG) capsule     60 capsule    Take 1 capsule by mouth 2 times daily       levothyroxine 50 MCG tablet    SYNTHROID    90 tablet    Take 1 tablet (50 mcg) by mouth daily       loperamide 2 MG capsule    IMODIUM    60 capsule    Take 1 capsule (2 mg) by mouth 3 times daily as needed for diarrhea       loratadine 10 MG tablet    CLARITIN    90 tablet    Take 1 tablet  "(10 mg) by mouth daily       NovoLOG FLEXPEN 100 UNIT/ML injection   Generic drug:  insulin aspart     15 mL    Inject 7 units before meals BID. At bedtime use following sliding scale:  150-200 5u 201-250 7u 251-300 9u 301-350 11u 351+ 13u       NYSTOP 309461 UNIT/GM Powd powder     60 g    Externally apply 1 g topically 3 times daily as needed       ondansetron 4 MG ODT tab    ZOFRAN ODT    20 tablet    Take 1-2 tablets (4-8 mg) by mouth every 8 hours as needed for nausea       * order for DME     3 catheter    Equipment being ordered: Johnson Catheters - 18 french       * order for DME     100 pad    Equipment being ordered: Blue Chux Please send to [facility]       * order for DME     1 each    Equipment being ordered: AMSC CPAP interface (nasal pillows), size XS.       OXcarbazepine 300 MG tablet    TRILEPTAL    180 tablet    Take 1 tablet (300 mg) by mouth 2 times daily       phenazopyridine 100 MG tablet    PYRIDIUM    6 tablet    Take 1 tablet (100 mg) by mouth 3 times daily as needed for irritation       pravastatin 10 MG tablet    PRAVACHOL    90 tablet    Take 1 tablet (10 mg) by mouth daily       predniSONE 2.5 MG tablet    DELTASONE    30 tablet    Take 1 tablet (2.5 mg) by mouth daily       simethicone 125 MG Chew chewable tablet    MYLICON    120 tablet    Take 1 tablet (125 mg) by mouth as needed for intestinal gas       * sirolimus Tabs tablet     30 tablet    Take 1 tablet (1 mg) by mouth daily GENERIC (total dose 1.5 mg daily)       * sirolimus Tabs tablet     30 tablet    Take 1 tablet (0.5 mg) by mouth daily GENERIC (1.5 mg daily)       syringe/needle (disp) 22G X 1-1/2\" 3 ML Misc    B-D SYRINGE/NEEDLE    50 each    Inject 1 Syringe into the muscle every 30 days       * tacrolimus 0.5 MG capsule    PROGRAF - GENERIC EQUIVALENT    60 capsule    HOLD       * tacrolimus 1 MG capsule    PROGRAF - GENERIC EQUIVALENT    120 capsule    Take 2 capsules (2 mg) by mouth 2 times daily       TraMADol " HCl 50 MG Tbdp     90 tablet    Take 50 mg by mouth 3 times daily as needed       vitamin D 2000 UNITS tablet     100 tablet    Take 4,000 Units by mouth daily       * Notice:  This list has 8 medication(s) that are the same as other medications prescribed for you. Read the directions carefully, and ask your doctor or other care provider to review them with you.

## 2017-02-01 NOTE — PROGRESS NOTES
SUBJECTIVE:                                                    Gem Jade is a 68 year old female who is seen in her home due to inability to leave the home unassisted, morbid obesity, severe vertigo, generalized weakness, chronic pain, bed bound, rarely able to get up in the , seen today for the following health issues: Seen with PharmD    KIDNEY TRANSPLANT      Duration: right kidney replaced, left one non functional     Description (location/character/radiation): chronic managed by kidney transplant team     Intensity:  moderate    Accompanying signs and symptoms: fatigue,   History (similar episodes/previous evaluation):    Sirolimus Last Dose midnight 1/9/2017  Final     Sirolimus Level 4.0 (L) 5.0 - 15.0 ug/L Final     Comment:      Sirolimus Reference Range    Kidney Transplant    Adult                          ug/L      6-12 months post transplant  8-10      >12 months post transplant   5-8      >5 years post transplant     4-6      Tacrolimus Last Dose midnight 1/9/2017  Final     Tacrolimus Level 3.6 (L) 5.0 - 15.0 ug/L Final     Comment:      Tacrolimus Reference Range        Adult      0-6 months post transplant   8-10      6-12 months post transplant  6-8      >12 months post transplant   4-6      >5 years post transplant     3-5              Precipitating or alleviating factors: DM, bed bound     Therapies tried and outcome: see transplant team notes, no med changes since 11/16, recent labs within normal limits          Diabetes Follow-up    Patient is checking blood sugars: once daily.  Results are as follows: 200-400's at         noon- non fasting, took 2 fasting sugars 188 and 190    Diabetic concerns: frequent infections and other -  elevated evening sugars, not checking non  fasting sugars regulalrly, taking SS novolog, and PharmD is having her take 5U at dinner, and lantus 14u now at bedtime, only eating 1x a day, with some snacks     Willing to increase her SS Novolog to 7u with  substantial meals           Symptoms of hypoglycemia (low blood sugar): shaky, weak, lethargy, confusion, Symptoms occur if sugars < below 50.     Paresthesias (numbness or burning in feet) or sores: Yes hands and feet, using gabapentin with good relief, has been off Trileptal for one week and her neuropathy has increased, will restart today     Date of last diabetic eye exam: unsure     Ordered her a Acucheck compact plus today               Amount of exercise or physical activity: None, bed bound, no longer getting up in her wheel chair, worked with PT on core strengthening and turning without using arms, able to turn herself, shoulder pain off and on which limits her ability to turn herself when the pain is bad      Problems taking medications regularly: No,  set them up and adm them    Medication side effects: none  Diet: diabetic, appetite has decreased and only eating 1x a day, occ snacking, no longer eating Ramen or cheesecake       Musculoskeletal problem/pain      Duration: Chronic, but stable with stopping baclofen, shoulder pain off and on     Description  Location: shoulders, left the worse     Intensity:  Moderate,2-7 /10    Accompanying signs and symptoms: numbness and weakness in UE    History  Previous similar problem: YES  Previous evaluation:  x-ray, MRI and orthopedic evaluation  RIGHT SHOULDER THREE VIEWS 5/19/2013 2:23 PM  HISTORY: Rule out fracture.  COMPARISON: 9/14/2012.  FINDINGS: Again seen are degenerative changes of the  acromioclavicular joint. The right shoulder is otherwise unremarkable  and unchanged.    Precipitating or alleviating factors:  Trauma or overuse: YES- uses her arms to turn herself in bed  Aggravating factors include: any use of UE  Therapies tried and outcome: no longer using acetaminophen, using icy hot gel and tramadol 1x daily,scheduled and no PRN,  working on core strength, still using chinese patches and working well            Hypertension  Follow-up      Outpatient blood pressures are being checked by [facility], stable    Low Salt Diet: not monitoring salt         Diarrhea  Duration of complaint: no incidents of diarrhea now     UTI - Female  Duration of complaint:  leaking around the cath, but has improved now, seems to depend on who inserts it,  still having it changed 1x a month, still using lidocaine gel with cath changes       Problem list and histories reviewed & adjusted, as indicated.  Additional history: as documented    Recent Labs   Lab Test  01/09/17   1220  12/05/16   1210  11/14/16   1200   09/05/16   0617  09/04/16   0815   08/30/16   1709   08/30/16   0032   06/13/16   1140   05/26/16   0744   12/20/15   0853   02/05/15   1250   05/16/13   1240   A1C   --   7.4*   --    --    --    --    --    --    --    --    --   6.7*   --    --    --   5.5   < >  6.9*   < >  7.2*   LDL   --   64   --    --    --    --    --    --    --    --    --    --    --    --    --    --    --   18   --   149*   HDL   --   46*   --    --    --    --    --    --    --    --    --    --    --    --    --    --    --   43*   --   42*   TRIG   --   272*   --    --    --    --    --    --    --    --    --    --    --    --    --    --    --   223*   --   262*   ALT   --    --    --    --   10  10   --   8   --   6   < >   --    --    --    < >   --    < >   --    < >   --    CR  1.37*   --   0.86   < >  0.54  0.58   < >  1.38*   < >   --    < >   --    < >  0.70   < >  0.74   < >  0.67   < >  0.70   GFRESTIMATED  38*   --   66   < >  >90  Non  GFR Calc    >90  Non  GFR Calc     < >  38*   < >   --    < >   --    < >  83   < >  78   < >  87   < >  84   GFRESTBLACK  46*   --   79   < >  >90   GFR Calc    >90   GFR Calc     < >  46*   < >   --    < >   --    < >  >90   GFR Calc     < >  >90   GFR Calc     < >  >90   GFR Calc     < >  >90   POTASSIUM  4.6   --    4.1   < >  4.0  4.0   < >  4.3   < >   --    < >   --    < >  3.5   < >  3.3*   < >  4.3   < >  3.9   TSH   --    --    --    --    --    --    --    --    --   1.01   --    --    --   0.96   --    --    --    --    < >   --     < > = values in this interval not displayed.      BP Readings from Last 3 Encounters:   01/04/17 110/58   11/16/16 110/70   10/19/16 110/58    Wt Readings from Last 3 Encounters:   09/03/16 201 lb 8 oz (91.4 kg)   08/09/16 200 lb (90.719 kg)   06/22/16 198 lb (89.812 kg)            Labs reviewed in EPIC  Problem list, Medication list, Allergies, and Medical/Social/Surgical histories reviewed in [facility] and updated as appropriate.    ROS:  CONSTITUTIONAL:NEGATIVE for fever, chills, change in weight and fatigue  INTEGUMENTARY/SKIN: NEGATIVE for worrisome rashes, moles or lesions  R: NEGATIVE for significant cough or SOB  CV: NEGATIVE for chest pain, palpitations or peripheral edema  MUSCULOSKELETAL: POSITIVE  for muscle weakness generalized and hx of  shoulder and back pain  PSYCHIATRIC: NEGATIVE for concentration difficulty, Hx anxiety, fatigue,    OBJECTIVE:                                                    /80 mmHg  Pulse 68  Resp 14  SpO2 99%  There is no weight on file to calculate BMI.  GENERAL: alert, mild distress, obese, and laying in bed, engaged in interview,   RESP: lungs clear to auscultation - no rales, rhonchi or wheezes  CV: regular rate and rhythm, normal S1 S2, no S3 or S4, no murmur, click or rub, no peripheral edema  ABDOMEN: soft, nontender, no masses and bowel sounds diminished  MS: no gross musculoskeletal defects noted, no edema, limited ROM in both shoulders  SKIN: no suspicious lesions or rashes  NEURO: Decreased strength and tone, mentation intact and speech normal  PSYCH: mentation appears normal, affect normal/bright, very verbose    Diagnostic Test Results:  Results for orders placed or performed in visit on 01/09/17   CBC with platelets   Result Value Ref  Range    WBC 4.0 4.0 - 11.0 10e9/L    RBC Count 3.68 (L) 3.8 - 5.2 10e12/L    Hemoglobin 11.0 (L) 11.7 - 15.7 g/dL    Hematocrit 34.8 (L) 35.0 - 47.0 %    MCV 95 78 - 100 fl    MCH 29.9 26.5 - 33.0 pg    MCHC 31.6 31.5 - 36.5 g/dL    RDW 13.5 10.0 - 15.0 %    Platelet Count 109 (L) 150 - 450 10e9/L   Basic metabolic panel   Result Value Ref Range    Sodium 137 133 - 144 mmol/L    Potassium 4.6 3.4 - 5.3 mmol/L    Chloride 104 94 - 109 mmol/L    Carbon Dioxide 25 20 - 32 mmol/L    Anion Gap 8 3 - 14 mmol/L    Glucose 150 (H) 70 - 99 mg/dL    Urea Nitrogen 42 (H) 7 - 30 mg/dL    Creatinine 1.37 (H) 0.52 - 1.04 mg/dL    GFR Estimate 38 (L) >60 mL/min/1.7m2    GFR Estimate If Black 46 (L) >60 mL/min/1.7m2    Calcium 9.5 8.5 - 10.1 mg/dL   Tacrolimus level   Result Value Ref Range    Tacrolimus Last Dose midnight 1/9/2017     Tacrolimus Level 3.6 (L) 5.0 - 15.0 ug/L   Sirolimus level   Result Value Ref Range    Sirolimus Last Dose midnight 1/9/2017     Sirolimus Level 4.0 (L) 5.0 - 15.0 ug/L     *Note: Due to a large number of results and/or encounters for the requested time period, some results have not been displayed. A complete set of results can be found in Results Review.        ASSESSMENT/PLAN:                                                    ASSESSMENT / PLAN:  (E66.01) Morbid obesity, unspecified obesity type (H)  (primary encounter diagnosis)  Comment: Contributes to her DM elevated sugars, limited mobility and pain in her shoulders   Plan: Discussed diet again, small freq meals limit, simple carbs    (M25.519,  G89.29) Chronic shoulder pain, unspecified laterality  Comment: left one the worse off and on   Plan: using tramadol one tab daily and then topicals with good relief     (I10) Benign essential hypertension  Comment: stable  Plan: no med changes    (Z94.0) Kidney replaced by transplant  Comment: latest labs within normal limits, monitored by kidney transplant team   Plan: continue monitoring      (E11.42) Diabetic polyneuropathy associated with type 2 diabetes mellitus (H)  Comment: last HgbA1c was 7.4, non fasting sugars still too high   Plan: Will increase her SS Novolog to 7u with dinner and any other substantial meal, will restart her Trileptal     Patient Instructions   Please restart your Trileptal     Please increase your SS Novolog to 7u at dinner and with any other substantial meal     Continue to limit your simple carbs     Please remember to check some non fasting sugars     We will see you again in one month      Marivel Barcenas, NP, APRN [facility]    Visit time 60  min with > than 50% of the time spent in counseling on: care coordination, DM, morbid obesity, HTN, shoulder pain, kidney transplant,

## 2017-02-02 NOTE — PATIENT INSTRUCTIONS
Recommendations from today's MTM visit:                                                        1. Increase Novolog to 7 units with meals    2. Accu-Chek Compact Plus strips and machine have been ordered.    3. Restart Trileptal (oxcarbazepine) in order to better control your nerve pain.    Next MTM visit: 1 month    To schedule another MTM appointment, please call the clinic directly or you may call the MTM scheduling line at [phone] or toll-free at [phone].     My Clinical Pharmacist's contact information:                                                      It was a pleasure seeing you today!  Please feel free to contact me with any questions or concerns you have.      Jhonathan Lerner, Pharmacy student  Conchita Toledo, PharmD, BCACP     You may receive a survey about the MTM services you received.  I would appreciate your feedback to help me serve you better in the future. Please fill it out and return it when you can. Your comments will be anonymous.

## 2017-02-06 DIAGNOSIS — D84.9 IMMUNOSUPPRESSED STATUS (H): ICD-10-CM

## 2017-02-06 DIAGNOSIS — Z94.0 KIDNEY TRANSPLANT RECIPIENT: ICD-10-CM

## 2017-02-06 DIAGNOSIS — Z79.60 LONG-TERM USE OF IMMUNOSUPPRESSANT MEDICATION: ICD-10-CM

## 2017-02-06 LAB
ANION GAP SERPL CALCULATED.3IONS-SCNC: 8 MMOL/L (ref 3–14)
BUN SERPL-MCNC: 39 MG/DL (ref 7–30)
CALCIUM SERPL-MCNC: 9.1 MG/DL (ref 8.5–10.1)
CHLORIDE SERPL-SCNC: 101 MMOL/L (ref 94–109)
CO2 SERPL-SCNC: 28 MMOL/L (ref 20–32)
CREAT SERPL-MCNC: 1.04 MG/DL (ref 0.52–1.04)
ERYTHROCYTE [DISTWIDTH] IN BLOOD BY AUTOMATED COUNT: 14.2 % (ref 10–15)
GFR SERPL CREATININE-BSD FRML MDRD: 52 ML/MIN/1.7M2
GLUCOSE SERPL-MCNC: 288 MG/DL (ref 70–99)
HCT VFR BLD AUTO: 34.8 % (ref 35–47)
HGB BLD-MCNC: 10.9 G/DL (ref 11.7–15.7)
MCH RBC QN AUTO: 29.8 PG (ref 26.5–33)
MCHC RBC AUTO-ENTMCNC: 31.3 G/DL (ref 31.5–36.5)
MCV RBC AUTO: 95 FL (ref 78–100)
PLATELET # BLD AUTO: 123 10E9/L (ref 150–450)
POTASSIUM SERPL-SCNC: 4.3 MMOL/L (ref 3.4–5.3)
RBC # BLD AUTO: 3.66 10E12/L (ref 3.8–5.2)
SODIUM SERPL-SCNC: 137 MMOL/L (ref 133–144)
WBC # BLD AUTO: 4.8 10E9/L (ref 4–11)

## 2017-02-06 PROCEDURE — 80048 BASIC METABOLIC PNL TOTAL CA: CPT | Performed by: INTERNAL MEDICINE

## 2017-02-06 PROCEDURE — 36415 COLL VENOUS BLD VENIPUNCTURE: CPT | Performed by: INTERNAL MEDICINE

## 2017-02-06 PROCEDURE — 80197 ASSAY OF TACROLIMUS: CPT | Performed by: INTERNAL MEDICINE

## 2017-02-06 PROCEDURE — 85027 COMPLETE CBC AUTOMATED: CPT | Performed by: INTERNAL MEDICINE

## 2017-02-06 PROCEDURE — 80195 ASSAY OF SIROLIMUS: CPT | Performed by: INTERNAL MEDICINE

## 2017-02-06 NOTE — PATIENT INSTRUCTIONS
Please restart your Trileptal     Please increase your SS Novolog to 7u at dinner and with any other substantial meal     Continue to limit your simple carbs     Please remember to check some non fasting sugars     We will see you again in one month

## 2017-02-07 LAB
SIROLIMUS BLD-MCNC: 5.1 UG/L (ref 5–15)
TACROLIMUS BLD-MCNC: 4.4 UG/L (ref 5–15)
TME LAST DOSE: ABNORMAL H
TME LAST DOSE: NORMAL H

## 2017-02-09 ENCOUNTER — TELEPHONE (OUTPATIENT)
Dept: FAMILY MEDICINE | Facility: CLINIC | Age: 72
End: 2017-02-09

## 2017-02-09 NOTE — TELEPHONE ENCOUNTER
We did not make any changes to the dose, Shantelle was just supposed to restart it. She can take 1 tablet twice a day as the prescription says.    Conchita Toledo, PharmD, BCACP

## 2017-02-09 NOTE — TELEPHONE ENCOUNTER
Chang calling for clarification on whther or not the pt is to be taking oxycarbazpine 1 x per day or 2. The order from previous order states 2 x per day. The  is questioning this as Shantelle told him it went down to once.    Thalia Cazares MA

## 2017-02-13 ENCOUNTER — TELEPHONE (OUTPATIENT)
Dept: NURSING | Facility: CLINIC | Age: 72
End: 2017-02-13

## 2017-02-13 ENCOUNTER — TELEPHONE (OUTPATIENT)
Dept: FAMILY MEDICINE | Facility: CLINIC | Age: 72
End: 2017-02-13

## 2017-02-13 DIAGNOSIS — N39.0 URINARY TRACT INFECTION: Primary | ICD-10-CM

## 2017-02-13 DIAGNOSIS — G89.29 CHRONIC SHOULDER PAIN, UNSPECIFIED LATERALITY: ICD-10-CM

## 2017-02-13 DIAGNOSIS — M25.519 CHRONIC SHOULDER PAIN, UNSPECIFIED LATERALITY: ICD-10-CM

## 2017-02-13 LAB
ALBUMIN UR-MCNC: 300 MG/DL
AMORPH CRY #/AREA URNS HPF: ABNORMAL /HPF
APPEARANCE UR: ABNORMAL
BACTERIA #/AREA URNS HPF: ABNORMAL /HPF
BILIRUB UR QL STRIP: NEGATIVE
COLOR UR AUTO: YELLOW
GLUCOSE UR STRIP-MCNC: >1000 MG/DL
HGB UR QL STRIP: ABNORMAL
KETONES UR STRIP-MCNC: 40 MG/DL
LEUKOCYTE ESTERASE UR QL STRIP: ABNORMAL
NITRATE UR QL: POSITIVE
PH UR STRIP: 6 PH (ref 5–7)
RBC #/AREA URNS AUTO: 75 /HPF (ref 0–2)
SP GR UR STRIP: 1.01 (ref 1–1.03)
URN SPEC COLLECT METH UR: ABNORMAL
UROBILINOGEN UR STRIP-MCNC: NORMAL MG/DL (ref 0–2)
WBC #/AREA URNS AUTO: >182 /HPF (ref 0–2)
WBC CLUMPS #/AREA URNS HPF: PRESENT /HPF
YEAST #/AREA URNS HPF: ABNORMAL /HPF

## 2017-02-13 PROCEDURE — 87086 URINE CULTURE/COLONY COUNT: CPT

## 2017-02-13 PROCEDURE — 81001 URINALYSIS AUTO W/SCOPE: CPT

## 2017-02-13 RX ORDER — CEPHALEXIN 500 MG/1
500 CAPSULE ORAL 3 TIMES DAILY
Qty: 15 CAPSULE | Refills: 0 | Status: ON HOLD | OUTPATIENT
Start: 2017-02-13 | End: 2017-02-20

## 2017-02-13 NOTE — TELEPHONE ENCOUNTER
Tramadol 50mg tabs      Last Written Prescription Date: 09/06/2016  Last Fill Quantity: 90,  # refills: 3   Last Office Visit with G, P or [facility] prescribing provider: 02/01/2017                                         Next 5 appointments (look out 90 days)     Mar 01, 2017  2:00 PM CST   Return Visit with ANTHONY Antonio CNP   [facility]    [address] [place] [address]  [address] [place] [id number] [phone]            Mar 21, 2017 12:15 PM CDT   Office Visit with Conchita Toledo SONA   [facility] ([facility])    [address] [place] [address]  [address] [place] [id number] [phone]

## 2017-02-14 ENCOUNTER — DOCUMENTATION ONLY (OUTPATIENT)
Dept: CARE COORDINATION | Facility: CLINIC | Age: 72
End: 2017-02-14

## 2017-02-14 ENCOUNTER — TELEPHONE (OUTPATIENT)
Dept: FAMILY MEDICINE | Facility: CLINIC | Age: 72
End: 2017-02-14

## 2017-02-14 DIAGNOSIS — N39.0 URINARY TRACT INFECTION: Primary | ICD-10-CM

## 2017-02-14 NOTE — TELEPHONE ENCOUNTER
"Call Type: Triage Call    Presenting Problem: Care giver for Gem calling, wondering if  preliminary UA has returned, and if he can get an order for \"Cipro\"?  Did speak with on call provider Dr. Choudhury, whom had already  spoken with [facility] home care RN for initial order, and had received a  call from the lab.  Dr. Choudhury had reviewed chart, and does not want  to start treatment, as it may not be urinary related and has a  history of being overtreated for urinary symptoms.  Care giver angry  while speaking with this nurse.  Did receive order from Dr. Choudhury  for Keflex 500mg po TID x 5 days, and med ordered to pharmacy.  Did  advise care giver final UC may dictate change in treatment.  Questions answered.  Triage Note:  Guideline Title: Information Only Call; No Symptom Triage (Adult) ;  Information Only Call; No Symptom Triage (Adult)  Recommended Disposition: Call Provider When Office is Open  Original Inclination: Wanted to speak with a nurse  Override Disposition: Call Provider Immediately  Intended Action: Follow advice given  Physician Contacted: No  Requesting provider information for recently scheduled test, procedure; no triage  required. Needed information not available per approved resources or clinical  experience. ?  YES  Requesting regular office appointment ? NO  Sign(s) or symptom(s) associated with a diagnosed condition or with a new illness  ? NO  Requesting information about provider, services or community resources ? NO  Call back to complete assessment/clarification of information from prior caller to  complete triage ? NO  Requesting information and provider is best resource; no triage required. ? NO  Physician Instructions:  Care Advice:  "

## 2017-02-14 NOTE — TELEPHONE ENCOUNTER
This was the subject of several calls last night.  Febrile illness, Tmax 102.0.  Urine sent, findings included pyuria and bacteriuria.  Labs could not be drawn last night.  Patient has reported quinolone and sulfa allergies, GFR is adequate at 58.  I ERx'ed cephalexin x 5 day course, though I think there is a reasonable chance of resistance to this, so final culture and sensitivity will need to be checked.

## 2017-02-14 NOTE — PROGRESS NOTES
Spoke with SEKOU Cooney [facility] who states caregiver feels she may have something like the flu.  She has been running temps but no confusion noted.  Ivet is scheduled to see patient today at 3 pm and she will give update after her visit.    Routing to provider to review.  UA/UC only completed.  Any labs warranted?    Wendie Harris RN

## 2017-02-14 NOTE — PROGRESS NOTES
[facility] and [facility] now requests orders and shares plan of care/discharge summaries for some patients through [facility].  Please REPLY TO THIS MESSAGE in order to give authorization for orders when needed.  This is considered a verbal order, you will still receive a faxed copy of orders for signature.  Thank you for your assistance in improving collaboration for our patients.    ORDER    2/13/17..[facility] now requests orders and shares plan of care/discharge summaries for some patients through [facility].  Please REPLY TO THIS MESSAGE in order to give authorization for orders when needed.  This is considered a verbal order, you will still receive a faxed copy of orders for signature.  Thank you for your assistance in improving collaboration for our patients.    ORDER      [facility] triage RN obtained urine sample for UA/UC via catheter and took to [facility] oin 2/13/17.    Triage RN received orders from Dr. Lorenzo Choudhury to perform Lab Draw on  2/14/17  CBC with differential, Procalcitonin. Pt is very hard stick and Complex care sends a  for blood draws. Can you arrange this?    Thank you.

## 2017-02-14 NOTE — PROGRESS NOTES
Spoke with SEKOU Cooney [facility] who was at the visit with patient.  She states patient temp is 97.2 but spouse gave tylenol at noon.  BP elevated at 160/82.  Patient showing increased fatigue and some confusion, poor appetite.  She had a large BM today.      Will order labs CBC with diff and procalcitonin per RN protocol.      Ivet also requesting HHA 1w1, 2w8 and SN 2m3, 6 PRN.  Gave verbal ok on orders requested per RN protocol.    Routing to provider as FYI on labs ordered with symptoms noted.    Wendie Harris RN

## 2017-02-14 NOTE — PROGRESS NOTES
Received message from [facility] with [facility] that patient is scheduled tomorrow for labs.    Wendie Harris RN

## 2017-02-15 ENCOUNTER — TELEPHONE (OUTPATIENT)
Dept: FAMILY MEDICINE | Facility: CLINIC | Age: 72
End: 2017-02-15

## 2017-02-15 DIAGNOSIS — N39.0 URINARY TRACT INFECTION: ICD-10-CM

## 2017-02-15 LAB
BASOPHILS # BLD AUTO: 0 10E9/L (ref 0–0.2)
BASOPHILS NFR BLD AUTO: 0.2 %
DIFFERENTIAL METHOD BLD: ABNORMAL
EOSINOPHIL # BLD AUTO: 0.1 10E9/L (ref 0–0.7)
EOSINOPHIL NFR BLD AUTO: 1.4 %
ERYTHROCYTE [DISTWIDTH] IN BLOOD BY AUTOMATED COUNT: 13.8 % (ref 10–15)
HCT VFR BLD AUTO: 37.6 % (ref 35–47)
HGB BLD-MCNC: 12.1 G/DL (ref 11.7–15.7)
LYMPHOCYTES # BLD AUTO: 0.8 10E9/L (ref 0.8–5.3)
LYMPHOCYTES NFR BLD AUTO: 15.4 %
MCH RBC QN AUTO: 28.9 PG (ref 26.5–33)
MCHC RBC AUTO-ENTMCNC: 32.2 G/DL (ref 31.5–36.5)
MCV RBC AUTO: 90 FL (ref 78–100)
MONOCYTES # BLD AUTO: 0.9 10E9/L (ref 0–1.3)
MONOCYTES NFR BLD AUTO: 18.6 %
NEUTROPHILS # BLD AUTO: 3.2 10E9/L (ref 1.6–8.3)
NEUTROPHILS NFR BLD AUTO: 64.4 %
PLATELET # BLD AUTO: 109 10E9/L (ref 150–450)
RBC # BLD AUTO: 4.18 10E12/L (ref 3.8–5.2)
WBC # BLD AUTO: 5 10E9/L (ref 4–11)

## 2017-02-15 PROCEDURE — 36415 COLL VENOUS BLD VENIPUNCTURE: CPT | Performed by: FAMILY MEDICINE

## 2017-02-15 PROCEDURE — 85025 COMPLETE CBC W/AUTO DIFF WBC: CPT | Performed by: FAMILY MEDICINE

## 2017-02-15 NOTE — TELEPHONE ENCOUNTER
Chang had called regarding wife, they were wanting her most recent lab results as they hadn't been called yet. Explained that I was unable to give them to him and I would have someone give him a call back.     Fwd to Nurse Pool

## 2017-02-16 ENCOUNTER — TELEPHONE (OUTPATIENT)
Dept: FAMILY MEDICINE | Facility: CLINIC | Age: 72
End: 2017-02-16

## 2017-02-16 ENCOUNTER — APPOINTMENT (OUTPATIENT)
Dept: ULTRASOUND IMAGING | Facility: CLINIC | Age: 72
DRG: 698 | End: 2017-02-16
Attending: EMERGENCY MEDICINE
Payer: MEDICARE

## 2017-02-16 ENCOUNTER — HOSPITAL ENCOUNTER (INPATIENT)
Facility: CLINIC | Age: 72
LOS: 3 days | Discharge: HOME HEALTH | DRG: 698 | End: 2017-02-20
Attending: EMERGENCY MEDICINE | Admitting: STUDENT IN AN ORGANIZED HEALTH CARE EDUCATION/TRAINING PROGRAM
Payer: MEDICARE

## 2017-02-16 DIAGNOSIS — N39.0 COMPLICATED UTI (URINARY TRACT INFECTION): ICD-10-CM

## 2017-02-16 DIAGNOSIS — Z94.0 KIDNEY REPLACED BY TRANSPLANT: ICD-10-CM

## 2017-02-16 DIAGNOSIS — Z94.0 TRANSPLANTED KIDNEY: ICD-10-CM

## 2017-02-16 DIAGNOSIS — N39.0 URINARY TRACT INFECTION WITHOUT HEMATURIA, SITE UNSPECIFIED: Primary | ICD-10-CM

## 2017-02-16 DIAGNOSIS — E11.9 DIABETES MELLITUS (H): ICD-10-CM

## 2017-02-16 DIAGNOSIS — E83.52 HYPERCALCEMIA: ICD-10-CM

## 2017-02-16 DIAGNOSIS — E87.1 HYPONATREMIA: ICD-10-CM

## 2017-02-16 LAB
ALBUMIN SERPL-MCNC: 2.8 G/DL (ref 3.4–5)
ALBUMIN UR-MCNC: 300 MG/DL
ALP SERPL-CCNC: 80 U/L (ref 40–150)
ALT SERPL W P-5'-P-CCNC: 14 U/L (ref 0–50)
ANION GAP SERPL CALCULATED.3IONS-SCNC: 10 MMOL/L (ref 3–14)
APPEARANCE UR: ABNORMAL
AST SERPL W P-5'-P-CCNC: 9 U/L (ref 0–45)
BASOPHILS # BLD AUTO: 0 10E9/L (ref 0–0.2)
BASOPHILS NFR BLD AUTO: 0.1 %
BILIRUB SERPL-MCNC: 0.5 MG/DL (ref 0.2–1.3)
BILIRUB UR QL STRIP: NEGATIVE
BUN SERPL-MCNC: 38 MG/DL (ref 7–30)
CALCIUM SERPL-MCNC: 11.1 MG/DL (ref 8.5–10.1)
CHLORIDE SERPL-SCNC: 92 MMOL/L (ref 94–109)
CO2 SERPL-SCNC: 27 MMOL/L (ref 20–32)
COLOR UR AUTO: YELLOW
CREAT SERPL-MCNC: 1.17 MG/DL (ref 0.52–1.04)
DIFFERENTIAL METHOD BLD: ABNORMAL
EOSINOPHIL # BLD AUTO: 0.1 10E9/L (ref 0–0.7)
EOSINOPHIL NFR BLD AUTO: 1.9 %
ERYTHROCYTE [DISTWIDTH] IN BLOOD BY AUTOMATED COUNT: 14.2 % (ref 10–15)
GFR SERPL CREATININE-BSD FRML MDRD: 46 ML/MIN/1.7M2
GLUCOSE SERPL-MCNC: 360 MG/DL (ref 70–99)
GLUCOSE UR STRIP-MCNC: 300 MG/DL
HCT VFR BLD AUTO: 35.9 % (ref 35–47)
HGB BLD-MCNC: 11.7 G/DL (ref 11.7–15.7)
HGB UR QL STRIP: ABNORMAL
IMM GRANULOCYTES # BLD: 0.1 10E9/L (ref 0–0.4)
IMM GRANULOCYTES NFR BLD: 0.7 %
KETONES UR STRIP-MCNC: 10 MG/DL
LACTATE BLD-SCNC: 1 MMOL/L (ref 0.7–2.1)
LEUKOCYTE ESTERASE UR QL STRIP: ABNORMAL
LYMPHOCYTES # BLD AUTO: 1.2 10E9/L (ref 0.8–5.3)
LYMPHOCYTES NFR BLD AUTO: 17 %
MCH RBC QN AUTO: 29.4 PG (ref 26.5–33)
MCHC RBC AUTO-ENTMCNC: 32.6 G/DL (ref 31.5–36.5)
MCV RBC AUTO: 90 FL (ref 78–100)
MONOCYTES # BLD AUTO: 0.9 10E9/L (ref 0–1.3)
MONOCYTES NFR BLD AUTO: 12.7 %
NEUTROPHILS # BLD AUTO: 4.7 10E9/L (ref 1.6–8.3)
NEUTROPHILS NFR BLD AUTO: 67.6 %
NITRATE UR QL: POSITIVE
NRBC # BLD AUTO: 0 10*3/UL
NRBC BLD AUTO-RTO: 0 /100
PH UR STRIP: 6.5 PH (ref 5–7)
PHOSPHATE SERPL-MCNC: 2.9 MG/DL (ref 2.5–4.5)
PLATELET # BLD AUTO: 134 10E9/L (ref 150–450)
POTASSIUM SERPL-SCNC: 4.3 MMOL/L (ref 3.4–5.3)
PROT SERPL-MCNC: 8.2 G/DL (ref 6.8–8.8)
RBC # BLD AUTO: 3.98 10E12/L (ref 3.8–5.2)
RBC #/AREA URNS AUTO: >182 /HPF (ref 0–2)
SODIUM SERPL-SCNC: 129 MMOL/L (ref 133–144)
SP GR UR STRIP: >1.05 (ref 1–1.03)
URN SPEC COLLECT METH UR: ABNORMAL
UROBILINOGEN UR STRIP-MCNC: NORMAL MG/DL (ref 0–2)
WBC # BLD AUTO: 7 10E9/L (ref 4–11)
WBC #/AREA URNS AUTO: >182 /HPF (ref 0–2)
WBC CLUMPS #/AREA URNS HPF: PRESENT /HPF
YEAST #/AREA URNS HPF: ABNORMAL /HPF

## 2017-02-16 PROCEDURE — 25000128 H RX IP 250 OP 636: Performed by: EMERGENCY MEDICINE

## 2017-02-16 PROCEDURE — 83970 ASSAY OF PARATHORMONE: CPT | Performed by: EMERGENCY MEDICINE

## 2017-02-16 PROCEDURE — 96365 THER/PROPH/DIAG IV INF INIT: CPT | Performed by: EMERGENCY MEDICINE

## 2017-02-16 PROCEDURE — 87040 BLOOD CULTURE FOR BACTERIA: CPT | Performed by: EMERGENCY MEDICINE

## 2017-02-16 PROCEDURE — 87088 URINE BACTERIA CULTURE: CPT | Performed by: EMERGENCY MEDICINE

## 2017-02-16 PROCEDURE — 87186 SC STD MICRODIL/AGAR DIL: CPT | Performed by: EMERGENCY MEDICINE

## 2017-02-16 PROCEDURE — 83735 ASSAY OF MAGNESIUM: CPT | Performed by: EMERGENCY MEDICINE

## 2017-02-16 PROCEDURE — 84295 ASSAY OF SERUM SODIUM: CPT | Performed by: EMERGENCY MEDICINE

## 2017-02-16 PROCEDURE — 99285 EMERGENCY DEPT VISIT HI MDM: CPT | Performed by: EMERGENCY MEDICINE

## 2017-02-16 PROCEDURE — 99285 EMERGENCY DEPT VISIT HI MDM: CPT | Mod: Z6 | Performed by: EMERGENCY MEDICINE

## 2017-02-16 PROCEDURE — 80053 COMPREHEN METABOLIC PANEL: CPT | Performed by: EMERGENCY MEDICINE

## 2017-02-16 PROCEDURE — 83605 ASSAY OF LACTIC ACID: CPT | Performed by: EMERGENCY MEDICINE

## 2017-02-16 PROCEDURE — 87086 URINE CULTURE/COLONY COUNT: CPT | Performed by: EMERGENCY MEDICINE

## 2017-02-16 PROCEDURE — 40000556 ZZH STATISTIC PERIPHERAL IV START W US GUIDANCE

## 2017-02-16 PROCEDURE — 87106 FUNGI IDENTIFICATION YEAST: CPT | Performed by: EMERGENCY MEDICINE

## 2017-02-16 PROCEDURE — 81001 URINALYSIS AUTO W/SCOPE: CPT | Performed by: EMERGENCY MEDICINE

## 2017-02-16 PROCEDURE — 76776 US EXAM K TRANSPL W/DOPPLER: CPT

## 2017-02-16 PROCEDURE — 84100 ASSAY OF PHOSPHORUS: CPT | Performed by: EMERGENCY MEDICINE

## 2017-02-16 PROCEDURE — 96366 THER/PROPH/DIAG IV INF ADDON: CPT | Performed by: EMERGENCY MEDICINE

## 2017-02-16 PROCEDURE — 82330 ASSAY OF CALCIUM: CPT | Performed by: EMERGENCY MEDICINE

## 2017-02-16 PROCEDURE — 85025 COMPLETE CBC W/AUTO DIFF WBC: CPT | Performed by: EMERGENCY MEDICINE

## 2017-02-16 RX ORDER — SODIUM CHLORIDE 9 MG/ML
1000 INJECTION, SOLUTION INTRAVENOUS CONTINUOUS
Status: DISCONTINUED | OUTPATIENT
Start: 2017-02-16 | End: 2017-02-17

## 2017-02-16 RX ADMIN — CEFEPIME 2 G: 2 INJECTION, POWDER, FOR SOLUTION INTRAMUSCULAR; INTRAVENOUS at 22:16

## 2017-02-16 RX ADMIN — SODIUM CHLORIDE 500 ML: 9 INJECTION, SOLUTION INTRAVENOUS at 22:14

## 2017-02-16 NOTE — IP AVS SNAPSHOT
[address] [address]    Phone:  [phone]                                       After Visit Summary   2/16/2017    Gem Jade    MRN: [id number]           After Visit Summary Signature Page     I have received my discharge instructions, and my questions have been answered. I have discussed any challenges I see with this plan with the nurse or doctor.    ..........................................................................................................................................  Patient/Patient Representative Signature      ..........................................................................................................................................  Patient Representative Print Name and Relationship to Patient    ..................................................               ................................................  Date                                            Time    ..........................................................................................................................................  Reviewed by Signature/Title    ...................................................              ..............................................  Date                                                            Time

## 2017-02-16 NOTE — PROGRESS NOTES
Labs reviewed, patient started presumptively on Cephalexin, Uc is not indicating a UTI that we would treat, CBC normal, will have her finish her current 5 day treatment and then just supportive care, please have them call if sx worsen    Klaudia MCFARLANEP

## 2017-02-16 NOTE — TELEPHONE ENCOUNTER
"Xi, HC RN, called from pt's home at 4:35 PM. She said the pt's catheter is leaking, her urine is \"chunky\", pt is very confused, staring into space and out of it.  Shantelle is not eating much and she did not recognize RN who she has known for a while.    Xi thinks pt needs to be evaluated in the ED due to her complex history including kidney transplant, diabetes and sepsis.    Writer will route note to Klaudia for her information.    Madeline Flores RN        "

## 2017-02-16 NOTE — LETTER
Transition Communication Hand-off for Care Transitions to Next Level of Care Provider    Name: Gem Jade  MRN #: [id number]  Primary Care Provider: Marivel Barcenas NP     Primary Clinic:  [facility] [address] [facility] [address]  [facility] [address]     Reason for Hospitalization:  Hypercalcemia [E83.52]  Hyponatremia [E87.1]  Complicated UTI (urinary tract infection) [N39.0]  Admit Date/Time: 2/16/2017  6:20 PM  Discharge Date: 2/20/2017  Payor Source: Payor: MEDICA / Plan: MEDICA PRIME SOLUTION / Product Type: Indemnity /      Reason for Communication Hand-off Referral: Other Continuity of care    Discharge Plan: Discharge to home on oral antibiotics and resume home care services       Concern for non-adherence with plan of care:   No  Discharge Needs Assessment:  Needs       Most Recent Value    Anticipated Changes Related to Illness inability to care for self    Equipment Currently Used at Home glucometer, lift device, oxygen          Already enrolled in Tele-monitoring program and name of program:  NA  Follow-up specialty is recommended: Yes    Follow-up plan:  Future Appointments  Date Time Provider Department Center   3/1/2017 2:00 PM Marivel Barcenas, APRN CNP [facility]   3/21/2017 12:15 PM Conchita [place], [facility]       Any outstanding tests or procedures:        Referrals     Future Labs/Procedures    Home care nursing referral     Comments:    Resumption of home care services:  _______________________  [facility]  Phone  [phone]  Fax  [phone]  ______________________    RN skilled nursing visit. Resume previous RN orders.  Home health aide to assist with bathing 2-3 times/week.    Your provider has ordered home care nursing services. If you have not been contacted within 2 days of your discharge please call the inpatient department phone number at [phone] .              Myrna Biswas RN Care  Coordinator  [phone]    AVS/Discharge Summary is the source of truth; this is a helpful guide for improved communication of patient story

## 2017-02-16 NOTE — IP AVS SNAPSHOT
MRN:[id number]                      After Visit Summary   2/16/2017    Gem Jade    MRN: [id number]           Thank you!     Thank you for choosing [facility] for your care. Our goal is always to provide you with excellent care. Hearing back from our patients is one way we can continue to improve our services. Please take a few minutes to complete the written survey that you may receive in the mail after you visit with us. Thank you!        Patient Information     Date Of Birth          1945        About your hospital stay     You were admitted on:  February 17, 2017 You last received care in the:  [address] [facility]    You were discharged on:  February 20, 2017        Reason for your hospital stay       You were hospitalized for altered mental status. Found to have urinary tract infection. You were seen by infectious disease and transplant nephrology. Your rico was exchanged. No need for a percutaneous nephrostomy tube at this time point. Will have you continue oral antibiotics at home for an additional ten days.    You were also found to have high calcium. Concern that you may be on too much vitamin D. Will have you stop taking your vitamin D and calcium for now until you follow up with your primary care provider.                  Who to Call     For medical emergencies, please call 911.  For non-urgent questions about your medical care, please call your primary care provider or clinic, [phone]          Attending Provider     Provider Specialty    Stella Damon MD Emergency Medicine    Gustavo, Santosh Ventura MD Pediatrics    AustenRadha MD Internal Medicine       Primary Care Provider Office Phone # Fax #    ANTHONY Antonio [phone]        [facility] [address] [place], [place] [address]E [facility] 602  [facility] [address]         When to contact your care team       Should you have fevers or chills, confusion, nausea, vomiting, or  diarrhea.                  After Care Instructions     Diet       Follow this diet upon discharge: Orders Placed This Encounter      Advance Diet as Tolerated: Regular Diet Adult            Discharge Instructions       You will need to continue antibiotics until March 2nd.   Follow up with your primary care provider in one week.   Please stop taking you calcium and vitamin D until you have further instructions from your PCP            Tubes and drains       You are going home with the following tubes or drains: rico catheter.  Tube cares per hospital or home care instructions                  Follow-up Appointments     Adult [facility] Follow-up and recommended labs and tests       Follow up with primary care provider, Marivel Barcenas NP, within 7 days for hospital follow- up.  No follow up labs or test are needed.      Appointments on [facility] and/or [facility] (with [facility] or [facility] provider or service). Call [phone] if you haven't heard regarding these appointments within 7 days of discharge.                  Your next 10 appointments already scheduled     Mar 01, 2017  2:00 PM CST   [facility] AN[facility]Y [facility] CNP   [facility]    [address] [place] [address] [phone]            Mar 21, 2017 12:15 PM CDT   Office Visit with Conchita Toledo [facility] ([facility])    [address] [place] [address] [phone]           Bring a current list of meds and any records pertaining to this visit.  For Physicals, please bring immunization records and any forms needing to be filled out.  Please arrive 10 minutes early to complete paperwork.              Additional Services     Home care nursing referral       Resumption of home care services:  _______________________  [facility]  Phone   "[phone]  Fax  [phone]  ______________________    RN skilled nursing visit. Resume previous RN orders.  Home health aide to assist with bathing 2-3 times/week.    Your provider has ordered home care nursing services. If you have not been contacted within 2 days of your discharge please call the inpatient department phone number at [phone] .                  Pending Results     Date and Time Order Name Status Description    2017 2330 PTH Related Peptide Test In process     2017 2330 25 Hydroxyvitamin D2 and D3 In process     2017 2347 25 Hydroxyvitamin D2 and D3 In process     2017 2225 Blood Culture ONE site Preliminary             Statement of Approval     Ordered          17 1527  I have reviewed and agree with all the recommendations and orders detailed in this document.  EFFECTIVE NOW     Approved and electronically signed by:  Dennise Barrientos MD             Admission Information     Date & Time Provider Department Dept. Phone    2017 Radha Boyce MD Unit [address] [facility] [phone]      Your Vitals Were     Blood Pressure Pulse Temperature Respirations Height Weight    186/54 (BP Location: Right arm) 74 97.9  F (36.6  C) (Oral) 18 1.676 m (5' 6\") 93.1 kg (205 lb 4.8 oz)    Pulse Oximetry BMI (Body Mass Index)                94% 33.14 kg/m2          MyChart Information     [facility] lets you send messages to your doctor, view your test results, renew your prescriptions, schedule appointments and more. To sign up, go to [email or web address] . Click on \"Log in\" on the left side of the screen, which will take you to the Welcome page. Then click on \"Sign up Now\" on the right side of the page.     You will be asked to enter the access code listed below, as well as some personal information. Please follow the directions to create your username and password.     Your access code is: [email or web address]  Expires: 2017  8:50 AM     Your access code will  " in 90 days. If you need help or a new code, please call your [facility] clinic or [phone].        Care EveryWhere ID     This is your Care EveryWhere ID. This could be used by other organizations to access your [facility] medical records  [id number]           Review of your medicines      START taking        Dose / Directions    ciprofloxacin 500 MG tablet   Commonly known as:  CIPRO   Used for:  Complicated UTI (urinary tract infection)        Dose:  500 mg   Take 1 tablet (500 mg) by mouth 2 times daily for 10 days   Quantity:  20 tablet   Refills:  0         CONTINUE these medicines which have NOT CHANGED        Dose / Directions    * ACE NOT PRESCRIBED (INTENTIONAL)   Used for:  Type 2 diabetes, HbA1C goal < 8% (H)        Dose:  1 each   1 each continuous prn ACE Inhibitor not prescribed due to Worsening renal function on ACE/ARB therapy   Refills:  0       ACETAMINOPHEN PO        Dose:  325-650 mg   Take 325-650 mg by mouth every 4 hours as needed.   Refills:  0       alcohol swab prep pads   Used for:  Type 2 diabetes, HbA1C goal < 8% (H)        Use to swab area of injection/vyon and vials as directed.   Quantity:  1 Box   Refills:  12       ASPIRIN PO        Dose:  81 mg   Take 81 mg by mouth daily.   Refills:  0       benzonatate 100 MG capsule   Commonly known as:  TESSALON   Used for:  Cough        Dose:  100 mg   Take 1 capsule (100 mg) by mouth 3 times daily as needed for cough   Quantity:  50 capsule   Refills:  0       blood glucose monitoring meter device kit   Used for:  Type 2 diabetes mellitus with diabetic polyneuropathy, with long-term current use of insulin (H)        Use to test blood sugar 3 times daily or as directed.   Quantity:  1 kit   Refills:  0       blood glucose monitoring test strip   Commonly known as:  ACCU-CHEK COMPACT PLUS   Used for:  Type 2 diabetes mellitus with diabetic polyneuropathy, with long-term current use of insulin (H)        Use to test blood sugar 3 times daily  or as directed.   Quantity:  100 strip   Refills:  11       cloNIDine 0.1 MG tablet   Commonly known as:  CATAPRES   Used for:  HTN, goal below 140/90        Dose:  0.1 mg   Take 1 tablet (0.1 mg) by mouth 2 times daily   Quantity:  180 tablet   Refills:  3       clotrimazole 1 % cream   Commonly known as:  LOTRIMIN        Apply topically 2 times daily Apply to buttocks twice a day until rash resolved.   Quantity:  40 g   Refills:  0       Cranberry 400 MG Tabs        Dose:  400 mg   Take 400 mg by mouth 2 times daily   Refills:  0       cyanocobalamin 1000 MCG/ML injection   Commonly known as:  VITAMIN B12   Used for:  Iron deficiency anemia, unspecified iron deficiency anemia type        Dose:  1000 mcg   Inject 1 mL (1,000 mcg) into the muscle every 30 days   Quantity:  3 mL   Refills:  3       famotidine 20 MG tablet   Commonly known as:  PEPCID        Dose:  20 mg   Take 1 tablet (20 mg) by mouth 2 times daily   Quantity:  60 tablet   Refills:  1       gabapentin 300 MG capsule   Commonly known as:  NEURONTIN   Used for:  Diabetic polyneuropathy associated with diabetes mellitus due to underlying condition (H)        Take 3 tabs 2x daily   Quantity:  540 capsule   Refills:  3       GLUCAGON EMERGENCY 1 MG kit   Used for:  Hypoglycemia   Generic drug:  glucagon        Dose:  1 mg   Inject 1 mg into the muscle once   Quantity:  1 each   Refills:  11       insulin glargine 100 UNIT/ML injection   Commonly known as:  LANTUS SOLOSTAR   Used for:  Diabetes mellitus with background retinopathy (H)        Inject 14 units under the skin daily.   Quantity:  15 mL   Refills:  3       insulin pen needle 30G X 8 MM   Used for:  Type 2 diabetes mellitus with diabetic nephropathy (H)        Use as directed with Lantus   Quantity:  100 each   Refills:  3       labetalol 200 MG tablet   Commonly known as:  NORMODYNE   Used for:  Kidney replaced by transplant, Benign essential hypertension        Dose:  200 mg   Take 1 tablet  (200 mg) by mouth 2 times daily   Quantity:  180 tablet   Refills:  3       lactobacillus rhamnosus (GG) capsule   Used for:  Diarrhea        Dose:  1 capsule   Take 1 capsule by mouth 2 times daily   Quantity:  60 capsule   Refills:  11       levothyroxine 50 MCG tablet   Commonly known as:  SYNTHROID   Used for:  Other specified hypothyroidism        Dose:  50 mcg   Take 1 tablet (50 mcg) by mouth daily   Quantity:  90 tablet   Refills:  3       loperamide 2 MG capsule   Commonly known as:  IMODIUM   Used for:  Gastroenteritis        Dose:  2 mg   Take 1 capsule (2 mg) by mouth 3 times daily as needed for diarrhea   Quantity:  60 capsule   Refills:  0       loratadine 10 MG tablet   Commonly known as:  CLARITIN   Used for:  Acute nasopharyngitis        Dose:  10 mg   Take 1 tablet (10 mg) by mouth daily   Quantity:  90 tablet   Refills:  3       NovoLOG FLEXPEN 100 UNIT/ML injection   Used for:  Diabetes mellitus with background retinopathy (H)   Generic drug:  insulin aspart        Inject 7 units before meals BID. At bedtime use following sliding scale:  150-200 5u 201-250 7u 251-300 9u 301-350 11u 351+ 13u   Quantity:  15 mL   Refills:  0       NYSTOP 905089 UNIT/GM Powd powder   Used for:  Fungal infection        Dose:  1 g   Externally apply 1 g topically 3 times daily as needed   Quantity:  60 g   Refills:  6       ondansetron 4 MG ODT tab   Commonly known as:  ZOFRAN ODT   Used for:  Nausea        Dose:  4-8 mg   Take 1-2 tablets (4-8 mg) by mouth every 8 hours as needed for nausea   Quantity:  20 tablet   Refills:  5       * order for DME   Used for:  Johnson catheter status        Equipment being ordered: Johnson Catheters - 18 french   Quantity:  3 catheter   Refills:  PRN       * order for DME   Used for:  Fecal incontinence        Equipment being ordered: Blue Chux Please send to [facility]   Quantity:  100 pad   Refills:  3       * order for DME   Used for:  Obstructive sleep apnea syndrome         "Equipment being ordered: WhereverTV FX CPAP interface (nasal pillows), size XS.   Quantity:  1 each   Refills:  1       OXcarbazepine 300 MG tablet   Commonly known as:  TRILEPTAL   Used for:  Diabetic polyneuropathy associated with diabetes mellitus due to underlying condition (H)        Dose:  300 mg   Take 1 tablet (300 mg) by mouth 2 times daily   Quantity:  180 tablet   Refills:  3       phenazopyridine 100 MG tablet   Commonly known as:  PYRIDIUM   Used for:  Recurrent UTI, Johnson catheter status        Dose:  100 mg   Take 1 tablet (100 mg) by mouth 3 times daily as needed for irritation   Quantity:  6 tablet   Refills:  3       pravastatin 10 MG tablet   Commonly known as:  PRAVACHOL   Used for:  Hyperlipidemia LDL goal <100        Dose:  10 mg   Take 1 tablet (10 mg) by mouth daily   Quantity:  90 tablet   Refills:  3       predniSONE 2.5 MG tablet   Commonly known as:  DELTASONE   Used for:  Kidney replaced by transplant        Dose:  2.5 mg   Take 1 tablet (2.5 mg) by mouth daily   Quantity:  30 tablet   Refills:  11       simethicone 125 MG Chew chewable tablet   Commonly known as:  MYLICON        Dose:  125 mg   Take 1 tablet (125 mg) by mouth as needed for intestinal gas   Quantity:  120 tablet   Refills:  0       * sirolimus Tabs tablet   Used for:  Kidney transplant recipient        Dose:  1 mg   Take 1 tablet (1 mg) by mouth daily GENERIC (total dose 1.5 mg daily)   Quantity:  30 tablet   Refills:  11       * sirolimus Tabs tablet   Used for:  Kidney transplant recipient        Dose:  0.5 mg   Take 1 tablet (0.5 mg) by mouth daily GENERIC (1.5 mg daily)   Quantity:  30 tablet   Refills:  3       syringe/needle (disp) 22G X 1-1/2\" 3 ML Misc   Commonly known as:  B-D SYRINGE/NEEDLE   Used for:  Iron deficiency anemia, unspecified iron deficiency anemia type        Dose:  1 Syringe   Inject 1 Syringe into the muscle every 30 days   Quantity:  50 each   Refills:  0       * tacrolimus 0.5 MG capsule   Commonly " known as:  PROGRAF - GENERIC EQUIVALENT   Used for:  Kidney transplant recipient        HOLD   Quantity:  60 capsule   Refills:  6       * tacrolimus 1 MG capsule   Commonly known as:  PROGRAF - GENERIC EQUIVALENT   Used for:  Kidney transplant recipient        Dose:  2 mg   Take 2 capsules (2 mg) by mouth 2 times daily   Quantity:  120 capsule   Refills:  6       TraMADol HCl 50 MG Tbdp   Indication:  Moderate to Moderately Severe Pain   Used for:  Chronic shoulder pain, unspecified laterality        Dose:  50 mg   Take 50 mg by mouth 3 times daily as needed   Quantity:  90 tablet   Refills:  3       * Notice:  This list has 8 medication(s) that are the same as other medications prescribed for you. Read the directions carefully, and ask your doctor or other care provider to review them with you.      STOP taking     calcium-vitamin D 600-400 MG-UNIT per tablet   Commonly known as:  CALTRATE           cephALEXin 500 MG capsule   Commonly known as:  KEFLEX           vitamin D 2000 UNITS tablet                Where to get your medicines      These medications were sent to [facility] [place], [place] - [address]  [address] [facility] [address]     Phone:  [phone]     ciprofloxacin 500 MG tablet                Protect others around you: Learn how to safely use, store and throw away your medicines at www.disposemymeds.org.             Medication List: This is a list of all your medications and when to take them. Check marks below indicate your daily home schedule. Keep this list as a reference.      Medications           Morning Afternoon Evening Bedtime As Needed    * ACE NOT PRESCRIBED (INTENTIONAL)   1 each continuous prn ACE Inhibitor not prescribed due to Worsening renal function on ACE/ARB therapy                                ACETAMINOPHEN PO   Take 325-650 mg by mouth every 4 hours as needed.                                alcohol swab prep pads   Use to swab area of  injection/yvon and vials as directed.                                ASPIRIN PO   Take 81 mg by mouth daily.                                benzonatate 100 MG capsule   Commonly known as:  TESSALON   Take 1 capsule (100 mg) by mouth 3 times daily as needed for cough                                blood glucose monitoring meter device kit   Use to test blood sugar 3 times daily or as directed.                                blood glucose monitoring test strip   Commonly known as:  ACCU-CHEK COMPACT PLUS   Use to test blood sugar 3 times daily or as directed.                                ciprofloxacin 500 MG tablet   Commonly known as:  CIPRO   Take 1 tablet (500 mg) by mouth 2 times daily for 10 days                                cloNIDine 0.1 MG tablet   Commonly known as:  CATAPRES   Take 1 tablet (0.1 mg) by mouth 2 times daily   Last time this was given:  0.1 mg on 2/20/2017  8:31 AM                                clotrimazole 1 % cream   Commonly known as:  LOTRIMIN   Apply topically 2 times daily Apply to buttocks twice a day until rash resolved.                                Cranberry 400 MG Tabs   Take 400 mg by mouth 2 times daily                                cyanocobalamin 1000 MCG/ML injection   Commonly known as:  VITAMIN B12   Inject 1 mL (1,000 mcg) into the muscle every 30 days                                famotidine 20 MG tablet   Commonly known as:  PEPCID   Take 1 tablet (20 mg) by mouth 2 times daily                                gabapentin 300 MG capsule   Commonly known as:  NEURONTIN   Take 3 tabs 2x daily                                GLUCAGON EMERGENCY 1 MG kit   Inject 1 mg into the muscle once   Generic drug:  glucagon                                insulin glargine 100 UNIT/ML injection   Commonly known as:  LANTUS SOLOSTAR   Inject 14 units under the skin daily.   Last time this was given:  14 Units on 2/20/2017  8:34 AM                                insulin pen needle 30G X 8  MM   Use as directed with Lantus                                labetalol 200 MG tablet   Commonly known as:  NORMODYNE   Take 1 tablet (200 mg) by mouth 2 times daily   Last time this was given:  200 mg on 2/20/2017  8:31 AM                                lactobacillus rhamnosus (GG) capsule   Take 1 capsule by mouth 2 times daily                                levothyroxine 50 MCG tablet   Commonly known as:  SYNTHROID   Take 1 tablet (50 mcg) by mouth daily   Last time this was given:  50 mcg on 2/20/2017  8:31 AM                                loperamide 2 MG capsule   Commonly known as:  IMODIUM   Take 1 capsule (2 mg) by mouth 3 times daily as needed for diarrhea                                loratadine 10 MG tablet   Commonly known as:  CLARITIN   Take 1 tablet (10 mg) by mouth daily                                NovoLOG FLEXPEN 100 UNIT/ML injection   Inject 7 units before meals BID. At bedtime use following sliding scale:  150-200 5u 201-250 7u 251-300 9u 301-350 11u 351+ 13u   Last time this was given:  1 Units on 2/20/2017  1:13 PM   Generic drug:  insulin aspart                                NYSTOP 339062 UNIT/GM Powd powder   Externally apply 1 g topically 3 times daily as needed                                ondansetron 4 MG ODT tab   Commonly known as:  ZOFRAN ODT   Take 1-2 tablets (4-8 mg) by mouth every 8 hours as needed for nausea                                * order for DME   Equipment being ordered: Alex Catheters - 18 french                                * order for DME   Equipment being ordered: Blue Chux Please send to [facility]                                * order for DME   Equipment being ordered: [employer] FX CPAP interface (nasal pillows), size XS.                                OXcarbazepine 300 MG tablet   Commonly known as:  TRILEPTAL   Take 1 tablet (300 mg) by mouth 2 times daily                                phenazopyridine 100 MG tablet   Commonly known as:  PYRIDIUM  "  Take 1 tablet (100 mg) by mouth 3 times daily as needed for irritation                                pravastatin 10 MG tablet   Commonly known as:  PRAVACHOL   Take 1 tablet (10 mg) by mouth daily   Last time this was given:  10 mg on 2/20/2017  8:31 AM                                predniSONE 2.5 MG tablet   Commonly known as:  DELTASONE   Take 1 tablet (2.5 mg) by mouth daily   Last time this was given:  2.5 mg on 2/20/2017  8:31 AM                                simethicone 125 MG Chew chewable tablet   Commonly known as:  MYLICON   Take 1 tablet (125 mg) by mouth as needed for intestinal gas                                * sirolimus Tabs tablet   Take 1 tablet (1 mg) by mouth daily GENERIC (total dose 1.5 mg daily)   Last time this was given:  1.5 mg on 2/20/2017  8:31 AM                                * sirolimus Tabs tablet   Take 1 tablet (0.5 mg) by mouth daily GENERIC (1.5 mg daily)   Last time this was given:  1.5 mg on 2/20/2017  8:31 AM                                syringe/needle (disp) 22G X 1-1/2\" 3 ML Misc   Commonly known as:  B-D SYRINGE/NEEDLE   Inject 1 Syringe into the muscle every 30 days                                * tacrolimus 0.5 MG capsule   Commonly known as:  PROGRAF - GENERIC EQUIVALENT   HOLD   Last time this was given:  2 mg on 2/20/2017  8:30 AM                                * tacrolimus 1 MG capsule   Commonly known as:  PROGRAF - GENERIC EQUIVALENT   Take 2 capsules (2 mg) by mouth 2 times daily   Last time this was given:  2 mg on 2/20/2017  8:30 AM                                TraMADol HCl 50 MG Tbdp   Take 50 mg by mouth 3 times daily as needed                                * Notice:  This list has 8 medication(s) that are the same as other medications prescribed for you. Read the directions carefully, and ask your doctor or other care provider to review them with you.      "

## 2017-02-17 ENCOUNTER — APPOINTMENT (OUTPATIENT)
Dept: GENERAL RADIOLOGY | Facility: CLINIC | Age: 72
DRG: 698 | End: 2017-02-17
Attending: INTERNAL MEDICINE
Payer: MEDICARE

## 2017-02-17 ENCOUNTER — APPOINTMENT (OUTPATIENT)
Dept: CT IMAGING | Facility: CLINIC | Age: 72
DRG: 698 | End: 2017-02-17
Attending: INTERNAL MEDICINE
Payer: MEDICARE

## 2017-02-17 LAB
ANION GAP SERPL CALCULATED.3IONS-SCNC: 13 MMOL/L (ref 3–14)
BUN SERPL-MCNC: 40 MG/DL (ref 7–30)
CA-I BLD-MCNC: 4.9 MG/DL (ref 4.4–5.2)
CA-I SERPL ISE-MCNC: 6.1 MG/DL (ref 4.4–5.2)
CALCIUM SERPL-MCNC: 11.2 MG/DL (ref 8.5–10.1)
CHLORIDE SERPL-SCNC: 97 MMOL/L (ref 94–109)
CO2 SERPL-SCNC: 25 MMOL/L (ref 20–32)
CREAT SERPL-MCNC: 1.05 MG/DL (ref 0.52–1.04)
ERYTHROCYTE [DISTWIDTH] IN BLOOD BY AUTOMATED COUNT: 14 % (ref 10–15)
GFR SERPL CREATININE-BSD FRML MDRD: 52 ML/MIN/1.7M2
GLUCOSE BLDC GLUCOMTR-MCNC: 199 MG/DL (ref 70–99)
GLUCOSE BLDC GLUCOMTR-MCNC: 224 MG/DL (ref 70–99)
GLUCOSE BLDC GLUCOMTR-MCNC: 267 MG/DL (ref 70–99)
GLUCOSE BLDC GLUCOMTR-MCNC: 316 MG/DL (ref 70–99)
GLUCOSE BLDC GLUCOMTR-MCNC: 340 MG/DL (ref 70–99)
GLUCOSE BLDC GLUCOMTR-MCNC: 358 MG/DL (ref 70–99)
GLUCOSE SERPL-MCNC: 354 MG/DL (ref 70–99)
HCT VFR BLD AUTO: 36.2 % (ref 35–47)
HGB BLD-MCNC: 11.6 G/DL (ref 11.7–15.7)
MAGNESIUM SERPL-MCNC: 2 MG/DL (ref 1.6–2.3)
MCH RBC QN AUTO: 28.9 PG (ref 26.5–33)
MCHC RBC AUTO-ENTMCNC: 32 G/DL (ref 31.5–36.5)
MCV RBC AUTO: 90 FL (ref 78–100)
PHOSPHATE SERPL-MCNC: 3.2 MG/DL (ref 2.5–4.5)
PLATELET # BLD AUTO: 113 10E9/L (ref 150–450)
POTASSIUM SERPL-SCNC: 4.4 MMOL/L (ref 3.4–5.3)
PTH-INTACT SERPL-MCNC: 9 PG/ML (ref 12–72)
RBC # BLD AUTO: 4.02 10E12/L (ref 3.8–5.2)
SODIUM SERPL-SCNC: 131 MMOL/L (ref 133–144)
SODIUM SERPL-SCNC: 135 MMOL/L (ref 133–144)
WBC # BLD AUTO: 6.1 10E9/L (ref 4–11)

## 2017-02-17 PROCEDURE — A9270 NON-COVERED ITEM OR SERVICE: HCPCS | Mod: GY | Performed by: INTERNAL MEDICINE

## 2017-02-17 PROCEDURE — 85027 COMPLETE CBC AUTOMATED: CPT | Performed by: INTERNAL MEDICINE

## 2017-02-17 PROCEDURE — 83550 IRON BINDING TEST: CPT | Performed by: INTERNAL MEDICINE

## 2017-02-17 PROCEDURE — 82306 VITAMIN D 25 HYDROXY: CPT | Performed by: STUDENT IN AN ORGANIZED HEALTH CARE EDUCATION/TRAINING PROGRAM

## 2017-02-17 PROCEDURE — 12000008 ZZH R&B INTERMEDIATE UMMC

## 2017-02-17 PROCEDURE — 74176 CT ABD & PELVIS W/O CONTRAST: CPT

## 2017-02-17 PROCEDURE — A9270 NON-COVERED ITEM OR SERVICE: HCPCS | Mod: GY | Performed by: STUDENT IN AN ORGANIZED HEALTH CARE EDUCATION/TRAINING PROGRAM

## 2017-02-17 PROCEDURE — 84100 ASSAY OF PHOSPHORUS: CPT | Performed by: INTERNAL MEDICINE

## 2017-02-17 PROCEDURE — 25000128 H RX IP 250 OP 636: Performed by: EMERGENCY MEDICINE

## 2017-02-17 PROCEDURE — 25000131 ZZH RX MED GY IP 250 OP 636 PS 637: Mod: GY | Performed by: STUDENT IN AN ORGANIZED HEALTH CARE EDUCATION/TRAINING PROGRAM

## 2017-02-17 PROCEDURE — 99233 SBSQ HOSP IP/OBS HIGH 50: CPT | Mod: GC | Performed by: INTERNAL MEDICINE

## 2017-02-17 PROCEDURE — 87799 DETECT AGENT NOS DNA QUANT: CPT | Performed by: INTERNAL MEDICINE

## 2017-02-17 PROCEDURE — 82310 ASSAY OF CALCIUM: CPT | Performed by: INTERNAL MEDICINE

## 2017-02-17 PROCEDURE — 84550 ASSAY OF BLOOD/URIC ACID: CPT | Performed by: INTERNAL MEDICINE

## 2017-02-17 PROCEDURE — 00000146 ZZHCL STATISTIC GLUCOSE BY METER IP

## 2017-02-17 PROCEDURE — 25000132 ZZH RX MED GY IP 250 OP 250 PS 637: Mod: GY | Performed by: INTERNAL MEDICINE

## 2017-02-17 PROCEDURE — 25000132 ZZH RX MED GY IP 250 OP 250 PS 637: Mod: GY | Performed by: STUDENT IN AN ORGANIZED HEALTH CARE EDUCATION/TRAINING PROGRAM

## 2017-02-17 PROCEDURE — 27210995 ZZH RX 272: Performed by: INTERNAL MEDICINE

## 2017-02-17 PROCEDURE — 25000128 H RX IP 250 OP 636: Performed by: STUDENT IN AN ORGANIZED HEALTH CARE EDUCATION/TRAINING PROGRAM

## 2017-02-17 PROCEDURE — 86359 T CELLS TOTAL COUNT: CPT | Performed by: INTERNAL MEDICINE

## 2017-02-17 PROCEDURE — 82607 VITAMIN B-12: CPT | Performed by: INTERNAL MEDICINE

## 2017-02-17 PROCEDURE — 82728 ASSAY OF FERRITIN: CPT | Performed by: INTERNAL MEDICINE

## 2017-02-17 PROCEDURE — 94660 CPAP INITIATION&MGMT: CPT

## 2017-02-17 PROCEDURE — 36415 COLL VENOUS BLD VENIPUNCTURE: CPT | Performed by: INTERNAL MEDICINE

## 2017-02-17 PROCEDURE — 99212 OFFICE O/P EST SF 10 MIN: CPT

## 2017-02-17 PROCEDURE — 82746 ASSAY OF FOLIC ACID SERUM: CPT | Performed by: INTERNAL MEDICINE

## 2017-02-17 PROCEDURE — 96367 TX/PROPH/DG ADDL SEQ IV INF: CPT | Performed by: EMERGENCY MEDICINE

## 2017-02-17 PROCEDURE — 25000128 H RX IP 250 OP 636: Performed by: INTERNAL MEDICINE

## 2017-02-17 PROCEDURE — 99223 1ST HOSP IP/OBS HIGH 75: CPT | Mod: AI | Performed by: STUDENT IN AN ORGANIZED HEALTH CARE EDUCATION/TRAINING PROGRAM

## 2017-02-17 PROCEDURE — 82330 ASSAY OF CALCIUM: CPT | Performed by: INTERNAL MEDICINE

## 2017-02-17 PROCEDURE — 86360 T CELL ABSOLUTE COUNT/RATIO: CPT | Performed by: INTERNAL MEDICINE

## 2017-02-17 PROCEDURE — 80048 BASIC METABOLIC PNL TOTAL CA: CPT | Performed by: INTERNAL MEDICINE

## 2017-02-17 PROCEDURE — 83540 ASSAY OF IRON: CPT | Performed by: INTERNAL MEDICINE

## 2017-02-17 PROCEDURE — 74000 XR ABDOMEN 1 VW: CPT

## 2017-02-17 RX ORDER — SODIUM CHLORIDE 9 MG/ML
INJECTION, SOLUTION INTRAVENOUS CONTINUOUS
Status: DISCONTINUED | OUTPATIENT
Start: 2017-02-17 | End: 2017-02-17

## 2017-02-17 RX ORDER — SODIUM CHLORIDE 450 MG/100ML
INJECTION, SOLUTION INTRAVENOUS CONTINUOUS
Status: DISCONTINUED | OUTPATIENT
Start: 2017-02-17 | End: 2017-02-19

## 2017-02-17 RX ORDER — LABETALOL 200 MG/1
200 TABLET, FILM COATED ORAL 2 TIMES DAILY
Status: DISCONTINUED | OUTPATIENT
Start: 2017-02-17 | End: 2017-02-17

## 2017-02-17 RX ORDER — ONDANSETRON 2 MG/ML
4 INJECTION INTRAMUSCULAR; INTRAVENOUS EVERY 6 HOURS PRN
Status: DISCONTINUED | OUTPATIENT
Start: 2017-02-17 | End: 2017-02-20 | Stop reason: HOSPADM

## 2017-02-17 RX ORDER — KETOCONAZOLE 20 MG/G
CREAM TOPICAL DAILY
Status: DISCONTINUED | OUTPATIENT
Start: 2017-02-17 | End: 2017-02-20 | Stop reason: HOSPADM

## 2017-02-17 RX ORDER — PREDNISONE 5 MG/1
10 TABLET ORAL DAILY
Status: DISCONTINUED | OUTPATIENT
Start: 2017-02-17 | End: 2017-02-17

## 2017-02-17 RX ORDER — TACROLIMUS 1 MG/1
2 CAPSULE ORAL 2 TIMES DAILY
Status: DISCONTINUED | OUTPATIENT
Start: 2017-02-17 | End: 2017-02-20 | Stop reason: HOSPADM

## 2017-02-17 RX ORDER — ONDANSETRON 4 MG/1
4 TABLET, ORALLY DISINTEGRATING ORAL EVERY 6 HOURS PRN
Status: DISCONTINUED | OUTPATIENT
Start: 2017-02-17 | End: 2017-02-20 | Stop reason: HOSPADM

## 2017-02-17 RX ORDER — DEXTROSE MONOHYDRATE 25 G/50ML
25-50 INJECTION, SOLUTION INTRAVENOUS
Status: DISCONTINUED | OUTPATIENT
Start: 2017-02-17 | End: 2017-02-20 | Stop reason: HOSPADM

## 2017-02-17 RX ORDER — PREDNISONE 2.5 MG/1
2.5 TABLET ORAL DAILY
Status: DISCONTINUED | OUTPATIENT
Start: 2017-02-17 | End: 2017-02-20 | Stop reason: HOSPADM

## 2017-02-17 RX ORDER — PRAVASTATIN SODIUM 10 MG
10 TABLET ORAL DAILY
Status: DISCONTINUED | OUTPATIENT
Start: 2017-02-17 | End: 2017-02-20 | Stop reason: HOSPADM

## 2017-02-17 RX ORDER — LEVOTHYROXINE SODIUM 50 UG/1
50 TABLET ORAL
Status: DISCONTINUED | OUTPATIENT
Start: 2017-02-17 | End: 2017-02-20 | Stop reason: HOSPADM

## 2017-02-17 RX ORDER — LABETALOL 200 MG/1
200 TABLET, FILM COATED ORAL EVERY 12 HOURS SCHEDULED
Status: DISCONTINUED | OUTPATIENT
Start: 2017-02-17 | End: 2017-02-20 | Stop reason: HOSPADM

## 2017-02-17 RX ORDER — NICOTINE POLACRILEX 4 MG
15-30 LOZENGE BUCCAL
Status: DISCONTINUED | OUTPATIENT
Start: 2017-02-17 | End: 2017-02-20 | Stop reason: HOSPADM

## 2017-02-17 RX ORDER — NALOXONE HYDROCHLORIDE 0.4 MG/ML
.1-.4 INJECTION, SOLUTION INTRAMUSCULAR; INTRAVENOUS; SUBCUTANEOUS
Status: DISCONTINUED | OUTPATIENT
Start: 2017-02-17 | End: 2017-02-20 | Stop reason: HOSPADM

## 2017-02-17 RX ORDER — HYDRALAZINE HYDROCHLORIDE 25 MG/1
25 TABLET, FILM COATED ORAL ONCE
Status: COMPLETED | OUTPATIENT
Start: 2017-02-17 | End: 2017-02-17

## 2017-02-17 RX ORDER — CLONIDINE HYDROCHLORIDE 0.1 MG/1
0.1 TABLET ORAL 2 TIMES DAILY
Status: DISCONTINUED | OUTPATIENT
Start: 2017-02-17 | End: 2017-02-20 | Stop reason: HOSPADM

## 2017-02-17 RX ORDER — ERTAPENEM 1 G/1
1 INJECTION, POWDER, LYOPHILIZED, FOR SOLUTION INTRAMUSCULAR; INTRAVENOUS EVERY 24 HOURS
Status: DISCONTINUED | OUTPATIENT
Start: 2017-02-17 | End: 2017-02-19

## 2017-02-17 RX ORDER — CLONIDINE HYDROCHLORIDE 0.1 MG/1
0.1 TABLET ORAL 2 TIMES DAILY
Status: DISCONTINUED | OUTPATIENT
Start: 2017-02-17 | End: 2017-02-17

## 2017-02-17 RX ORDER — TACROLIMUS 1 MG/1
2 CAPSULE, GELATIN COATED ORAL
Status: DISCONTINUED | OUTPATIENT
Start: 2017-02-18 | End: 2017-02-17

## 2017-02-17 RX ORDER — LEVOTHYROXINE SODIUM 50 UG/1
50 TABLET ORAL DAILY
Status: DISCONTINUED | OUTPATIENT
Start: 2017-02-17 | End: 2017-02-17

## 2017-02-17 RX ADMIN — SODIUM CHLORIDE: 4.5 INJECTION, SOLUTION INTRAVENOUS at 19:05

## 2017-02-17 RX ADMIN — LABETALOL HCL 200 MG: 200 TABLET, FILM COATED ORAL at 08:24

## 2017-02-17 RX ADMIN — LABETALOL HCL 200 MG: 200 TABLET, FILM COATED ORAL at 20:13

## 2017-02-17 RX ADMIN — CLONIDINE HYDROCHLORIDE 0.1 MG: 0.1 TABLET ORAL at 08:24

## 2017-02-17 RX ADMIN — SODIUM CHLORIDE: 9 INJECTION, SOLUTION INTRAVENOUS at 14:00

## 2017-02-17 RX ADMIN — SODIUM CHLORIDE 1000 ML: 9 INJECTION, SOLUTION INTRAVENOUS at 00:11

## 2017-02-17 RX ADMIN — SIROLIMUS 1.5 MG: 1 TABLET, SUGAR COATED ORAL at 08:24

## 2017-02-17 RX ADMIN — INSULIN GLARGINE 14 UNITS: 100 INJECTION, SOLUTION SUBCUTANEOUS at 08:23

## 2017-02-17 RX ADMIN — KETOCONAZOLE: 20 CREAM TOPICAL at 21:49

## 2017-02-17 RX ADMIN — HYDRALAZINE HYDROCHLORIDE 25 MG: 25 TABLET ORAL at 20:13

## 2017-02-17 RX ADMIN — PREDNISONE 2.5 MG: 2.5 TABLET ORAL at 08:24

## 2017-02-17 RX ADMIN — ERTAPENEM SODIUM 1 G: 1 INJECTION, POWDER, LYOPHILIZED, FOR SOLUTION INTRAMUSCULAR; INTRAVENOUS at 01:29

## 2017-02-17 RX ADMIN — TACROLIMUS 2 MG: 1 CAPSULE ORAL at 08:23

## 2017-02-17 RX ADMIN — LEVOTHYROXINE SODIUM 50 MCG: 50 TABLET ORAL at 08:24

## 2017-02-17 RX ADMIN — TACROLIMUS 2 MG: 1 CAPSULE ORAL at 20:13

## 2017-02-17 RX ADMIN — PRAVASTATIN SODIUM 10 MG: 10 TABLET ORAL at 08:24

## 2017-02-17 RX ADMIN — CLONIDINE HYDROCHLORIDE 0.1 MG: 0.1 TABLET ORAL at 06:26

## 2017-02-17 RX ADMIN — CLONIDINE HYDROCHLORIDE 0.1 MG: 0.1 TABLET ORAL at 20:13

## 2017-02-17 NOTE — ED NOTES
Bed: ED04  Expected date: 2/16/17  Expected time:   Means of arrival:   Comments:  Jaimee 89  71 y.o. F  Confusion  Dx UTI 3 days ago  On abx for 2.5 days

## 2017-02-17 NOTE — PROGRESS NOTES
Care Coordinator Progress Note     Admission Date/Time:  2/16/2017  Attending MD:  Santosh Chen*     Data  Chart reviewed, discussed with interdisciplinary team.   Patient was admitted for:    Complicated UTI (urinary tract infection)  Hyponatremia  Hypercalcemia  Urinary tract infection without hematuria, site unspecified  Transplanted kidney  Diabetes mellitus (H)  Kidney replaced by transplant.             Assessment  Pt admitted with AMS, recurrent UTI.  Per chart review she is currently open with home care services.  Attempted to meet with pt this afternoon, however, per bedside nurse pt is still confused.  Called pt's spouse and left a message for him to call back.  D/C needs not known yet.     Plan  Anticipated Discharge Date:  TBD  Anticipated Discharge Plan:  TBD    [place] RN   [facility]

## 2017-02-17 NOTE — PLAN OF CARE
Problem: Goal Outcome Summary  Goal: Goal Outcome Summary  Nursing Focus: Admission  D: Arrived at about 0350 from the [facility] ED. Arrived on a gurney and was accompanied by one orderly. Admitted for altered mental status.        I:  Patient oriented to room, environment and use of  call light. Physician examined pt in the ED.      A: Somnolent. Oriented to self.  Arouses to verbal but drifts back to sleep. Was able to answer some admission questions but accuracy is questionable. /51. OVSS. She denies pain or difficulty breathing. She reported seeing people in the room that were not there. Easily redirected. Desaturates while in deep sleep. RT updated and CPAP initiated. Blood glucose 340. SS insulin given.      P: Continue to monitor patient's mental status and other s/sx of infections. Notify provider with changes in pt status.

## 2017-02-17 NOTE — PROGRESS NOTES
[facility]  Patient is currently open to home care services with [facility].  The patient is currently receiving RN and HHA       services.  [facility]  and team have been notified of patient admission.  [facility] liaison will continue to follow patient during stay.  If appropriate provide orders to resume home care at time of discharge.    Porsha Haas RN   [facility] Liaison

## 2017-02-17 NOTE — ED PROVIDER NOTES
[place] EMERGENCY DEPARTMENT ([facility])  February 16, 2017  ED 4 6:53 PM   History     Chief Complaint   Patient presents with     Altered Mental Status     UTI     The history is provided by the patient and medical records. The history is limited by the condition of the patient (confusion).     Gem Jade is a 71 year old female who presents with altered mental status and UTI. She has a history of type II diabetes on Lantus and Novolog, right kidney transplant and nonfunctioning left kidney on sirolimus and tacrolimus, morbid obesity, bedbound. She was diagnosed with UTI on 2/13/17. She was started on antibiotics for this. She has a home health nurse visited her who noticed that she was altered.  wasn't that concerned, and felt that she gets confused on antibiotics. Nurse called 911 and she was brought here via ambulance. Pulse ox was 87% on room air. She had her Johnson changed out today.   I have reviewed the Medications, Allergies, Past Medical and Surgical History, and Social History in the [employer] system.  PAST MEDICAL HISTORY:   Past Medical History   Diagnosis Date     Background diabetic retinopathy(362.01)      extensive diabetic retinopathy, s/p multiple laser treatments     Diabetic gastroparesis (H)      followed by [facility] (Dr. 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)      Urinary tract infection, site not specified      Chronic UTIs     UTI (urinary tract infection) due to urinary indwelling catheter (H)         PAST SURGICAL HISTORY:   Past Surgical History   Procedure Laterality Date     C nonspecific procedure  10/99     kidney transplant     C nonspecific procedure  8/00     MRI head, lumbar spine, pelvis     Hc left heart catheterization  1999     Cardiac Cath, Left Heart, Negative  (@ [facility])     C total knee arthroplasty  3/03     Knee Replacement, Total right, post op PE     Hc remv cataract extracap,insert lens  8/04     bilateral cataract repaired, left eye      Hc left heart catheterization  4/05     normal coronary angiogram at [facility], had mild troponin rise (0.71)     Cholecystectomy         FAMILY HISTORY:   Family History   Problem Relation Age of Onset     DIABETES Mother      DIABETES Brother      Hypertension Father      [id number] pneumonia     HEART DISEASE Father        SOCIAL HISTORY:   Social History   Substance Use Topics     Smoking status: Former Smoker     Years: 17.00     Quit date: 6/1/1987     Smokeless tobacco: Not on file     Alcohol use No       Patient's Medications   New Prescriptions    No medications on file   Previous Medications    ACE NOT PRESCRIBED, INTENTIONAL,    1 each continuous prn ACE Inhibitor not prescribed due to Worsening renal function on ACE/ARB therapy    ACETAMINOPHEN PO    Take 325-650 mg by mouth every 4 hours as needed.    ALCOHOL SWAB PREP PADS    Use to swab area of injection/yvon and vials as directed.    ASPIRIN PO    Take 81 mg by mouth daily.      BENZONATATE (TESSALON) 100 MG CAPSULE    Take 1 capsule (100 mg) by mouth 3 times daily as needed for cough    BLOOD GLUCOSE MONITORING (ACCU-CHEK COMPACT CARE KIT) METER DEVICE KIT    Use to test blood sugar 3 times daily or as directed.    BLOOD GLUCOSE MONITORING (ACCU-CHEK COMPACT PLUS) TEST STRIP    Use to test blood sugar 3 times daily or as directed.    CALCIUM-VITAMIN D (CALTRATE) 600-400 MG-UNIT PER TABLET    Take 1 tablet by mouth 2 times daily    CEPHALEXIN (KEFLEX) 500 MG CAPSULE    Take 1 capsule (500  mg) by mouth 3 times daily for 5 days    CHOLECALCIFEROL (VITAMIN D) 2000 UNITS TABLET    Take 4,000 Units by mouth daily     CLONIDINE (CATAPRES) 0.1 MG TABLET    Take 1 tablet (0.1 mg) by mouth 2 times daily    CLOTRIMAZOLE (LOTRIMIN) 1 % CREAM    Apply topically 2 times daily Apply to buttocks twice a day until rash resolved.    CRANBERRY 400 MG TABS    Take 400 mg by mouth 2 times daily    CYANOCOBALAMIN (VITAMIN B12) 1000 MCG/ML INJECTION    Inject 1 mL (1,000 mcg) into the muscle every 30 days    FAMOTIDINE (PEPCID) 20 MG TABLET    Take 1 tablet (20 mg) by mouth 2 times daily    GABAPENTIN (NEURONTIN) 300 MG CAPSULE    Take 3 tabs 2x daily    GLUCAGON (GLUCAGON EMERGENCY) 1 MG INJECTION    Inject 1 mg into the muscle once    INSULIN ASPART (NOVOLOG FLEXPEN) 100 UNIT/ML INJECTION    Inject 7 units before meals BID. At bedtime use following sliding scale:   150-200 5u  201-250 7u  251-300 9u  301-350 11u  351+ 13u    INSULIN GLARGINE (LANTUS SOLOSTAR) 100 UNIT/ML PEN    Inject 14 units under the skin daily.    INSULIN PEN NEEDLE 30G X 8 MM    Use as directed with Lantus    LABETALOL (NORMODYNE) 200 MG TABLET    Take 1 tablet (200 mg) by mouth 2 times daily    LACTOBACILLUS RHAMNOSUS, GG, (CULTURELL) CAPSULE    Take 1 capsule by mouth 2 times daily    LEVOTHYROXINE (SYNTHROID) 50 MCG TABLET    Take 1 tablet (50 mcg) by mouth daily    LOPERAMIDE (IMODIUM) 2 MG CAPSULE    Take 1 capsule (2 mg) by mouth 3 times daily as needed for diarrhea    LORATADINE (CLARITIN) 10 MG TABLET    Take 1 tablet (10 mg) by mouth daily    NYSTATIN (NYSTOP) 501078 UNIT/GM POWD    Externally apply 1 g topically 3 times daily as needed    ONDANSETRON (ZOFRAN ODT) 4 MG DISINTEGRATING TABLET    Take 1-2 tablets (4-8 mg) by mouth every 8 hours as needed for nausea    ORDER FOR DME    Equipment being ordered: Alex De León - 18 french    ORDER FOR DME    Equipment being ordered: [employer]  Please send to [facility] medical    ORDER FOR DME     "Equipment being ordered: [employer] FX CPAP interface (nasal pillows), size XS.    OXCARBAZEPINE (TRILEPTAL) 300 MG TABLET    Take 1 tablet (300 mg) by mouth 2 times daily    PHENAZOPYRIDINE (PYRIDIUM) 100 MG TABLET    Take 1 tablet (100 mg) by mouth 3 times daily as needed for irritation    PRAVASTATIN (PRAVACHOL) 10 MG TABLET    Take 1 tablet (10 mg) by mouth daily    PREDNISONE (DELTASONE) 2.5 MG TABLET    Take 1 tablet (2.5 mg) by mouth daily    RAPAMUNE 0.5 MG PO TABLET    Take 1 tablet (0.5 mg) by mouth daily GENERIC (1.5 mg daily)    RAPAMUNE 1 MG PO TABLET    Take 1 tablet (1 mg) by mouth daily GENERIC (total dose 1.5 mg daily)    SIMETHICONE (MYLICON) 125 MG CHEW    Take 1 tablet (125 mg) by mouth as needed for intestinal gas    SYRINGE/NEEDLE, DISP, (B-D SYRINGE/NEEDLE) 22G X 1-1/2\" 3 ML MISC    Inject 1 Syringe into the muscle every 30 days    TACROLIMUS (PROGRAF - GENERIC EQUIVALENT) 0.5 MG CAPSULE    HOLD    TACROLIMUS (PROGRAF - GENERIC EQUIVALENT) 1 MG CAPSULE    Take 2 capsules (2 mg) by mouth 2 times daily    TRAMADOL HCL 50 MG TBDP    Take 50 mg by mouth 3 times daily as needed   Modified Medications    No medications on file   Discontinued Medications    No medications on file          Allergies   Allergen Reactions     Bactrim Ds [Sulfamethoxazole W/Trimethoprim]      Creatinine level increased     Atorvastatin Calcium      myalgias, muscle aches     Codeine Nausea and Vomiting     Darvocet [Propoxyphene N-Apap] Nausea and Vomiting     Hydromorphone      Levofloxacin Diarrhea     Morphine      Nausea and vomiting     Niaspan [Niacin] GI Disturbance     Flushing even at low dose, GI upset     Percocet [Oxycodone-Acetaminophen]      Vomiting after taking med couple of times     Vicodin [Hydrocodone-Acetaminophen] Nausea and Vomiting        Review of Systems   Unable to perform ROS: Mental status change       Physical Exam   BP: 189/73  Pulse: 72  Temp: 97.9  F (36.6  C)  Resp: 14  SpO2: (!) 87 " %  Physical Exam   Constitutional: She appears well-developed and well-nourished. No distress.   HENT:   Head: Normocephalic.   Eyes: Right eye exhibits no discharge. Left eye exhibits no discharge.   Cardiovascular: Normal rate.    Pulmonary/Chest: No stridor. No respiratory distress.   Abdominal: She exhibits no distension. There is no tenderness.   Musculoskeletal: She exhibits no edema.   Neurological: She is alert. No cranial nerve deficit.   Skin: Skin is warm. She is not diaphoretic.       ED Course   Procedures      Assessments & Plan (with Medical Decision Making)   Gem Jade is a 71 year old female who is presenting to the ED with confusion as reported by her home health nurse. She has been treated with Abx for a UTI. She is also noted to be hyponatremic and hypercalcemic. With her mental status changes, which could be due to a complicated UTI or other electrolyte disturbances, the patient will be admitted to medicine.    I have reviewed the nursing notes.    I have reviewed the findings, diagnosis, plan and need for follow up with the patient.    New Prescriptions    No medications on file       Final diagnoses:   Complicated UTI (urinary tract infection)   Hyponatremia   Hypercalcemia     I, Carolyn Ansari, am serving as a trained medical scribe to document services personally performed by Stella Damon MD, based on the provider's statements to me.  This document has been checked and approved by the attending provider.    Stella LU MD, was physically present and have reviewed and verified the accuracy of this note documented by Carolyn Ansari medical scribe.     2/16/2017   [facility], EMERGENCY DEPARTMENT     Stella Damon MD  02/16/17 [id number]

## 2017-02-17 NOTE — CONSULTS
[facility]    Transplant Infectious Diseases Inpatient Consultation      Gem Jade MRN# [id number]   YOB: 1945 Age: 71 year old   Date of Admission and time: 2/16/2017  6:20 PM     Reason for consult: I was asked by Dr. Cehn to evaluate this patient for recurrent UTI.           Recommendations:   1. Continue with same ABx for now, will de-escalate once susceptibility is available.   2. Workup for hydronephrosis per primary team.   - rico obstruction.   - nephrolithiasis.   3. Ketoconazole or any other available antifungal powder to the genitalia.         Summary of Presentation:   Transplants:  10/27/1999 (Kidney), Postoperative day:  6323     This patient is a 71 year old female who's bedridden due to neuropathy with indwelling rico catheter in place and recurrent UTI.   Presented with altered mental status. Was found to have hydronephrosis of transplanted kidney.         Active Problems and Infectious Diseases Issues:   1. Rico-induced UTI with pyelonephritis.  2. Encephalopathy.   3. Hydronephrosis.     The patient has history of recurrent UTI. Now with hydronephrosis of the transplanted kidney and altered mental status.    I could not see that the most recent Ucx were due to ESBL. But to avoid multiple changes in ABx will continue ertapenem for now.   The etiology of the hydronephrosis is not clear but could be due to malfunctioning rico catheter or any other cause such as nephrolithiasis.   Unfortunately with indwelling rico catheter the risk of recurrent UTI can not be eliminated.   Will follow on repeat Ucx and Bcx.     4. Yeast infection of the genitalia  Local therapy with antifungal powder.         Old Problems and Infectious Diseases Issues:   1. Recurrent UTI.   2. Encephalopathy in September of 2016 with no clear etiology.   3. Bedridden with indwelling rico catheter.     Other Infectious Disease issues include:  - PJP prophylaxis: non at this  point.   - Serostatus: CMV D?/R?, EBV D?/R?, received zostavax in 2013.   - Immunization status: Up-to-date    Thank you very much Dr. Chen for involving me in the care of Mrs. Gem Jade. Please do not hesitate to call me for any question.     Attestation:  I interviewed the patient and obtained history from the patient, and by reviewing the patient's chart, microbiological data, and radiological data. All data are summarized in this notes.  Elaina Paez   Pager: [phone]  02/17/2017             History of Present Illness:   Transplants:  10/27/1999 (Kidney), Postoperative day:  6323     This patient is a 71 year old female who who's bedridden due to neuropathy with indwelling rico catheter in place and recurrent UTI.                 Past Medical History:     Past Medical History   Diagnosis Date     Background diabetic retinopathy(362.01)      extensive diabetic retinopathy, s/p multiple laser treatments     Diabetic gastroparesis (H)      followed by [facility] (Dr. 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)      Urinary tract infection, site not specified      Chronic UTIs     UTI (urinary tract infection) due to urinary indwelling catheter (H)              Past Surgical History:     Past Surgical History   Procedure Laterality Date     C nonspecific procedure  10/99     kidney transplant     C nonspecific procedure  8/00     MRI head, lumbar spine, pelvis     Hc left heart catheterization  1999     Cardiac Cath, Left Heart,  Negative  (@ [facility])     C total knee arthroplasty  3/03     Knee Replacement, Total right, post op PE     Hc remv cataract extracap,insert lens  8/04     bilateral cataract repaired, left eye      Hc left heart catheterization  4/05     normal coronary angiogram at [facility], had mild troponin rise (0.71)     Cholecystectomy                 Social History:     Social History   Substance Use Topics     Smoking status: Former Smoker     Years: 17.00     Quit date: 6/1/1987     Smokeless tobacco: Not on file     Alcohol use No             Family History:   I have reviewed this patient's family history  Family History   Problem Relation Age of Onset     DIABETES Mother      DIABETES Brother      Hypertension Father      [id number] pneumonia     HEART DISEASE Father              Immunizations:     Immunization History   Administered Date(s) Administered     Hepatitis B 01/13/2003     Influenza (H1N1) 11/25/2009     Influenza (High Dose) 3 valent vaccine 12/01/2015, 10/07/2016     Influenza (IIV3) 11/30/2000, 11/04/2002, 11/14/2003, 10/22/2004, 11/03/2005, 12/04/2006, 11/12/2007, 10/21/2008, 10/06/2009, 11/09/2010, 11/05/2011     Influenza Vaccine IM 3yrs+ 4 Valent IIV4 10/16/2013     Pneumococcal (PCV 13) 12/01/2015     Pneumococcal 23 valent 06/17/2013     TDAP (ADACEL AGES 11-64) 06/17/2013     Zoster vaccine, live 06/17/2013             Allergies:     Allergies   Allergen Reactions     Bactrim Ds [Sulfamethoxazole W/Trimethoprim]      Creatinine level increased     Atorvastatin Calcium      myalgias, muscle aches     Codeine Nausea and Vomiting     Darvocet [Propoxyphene N-Apap] Nausea and Vomiting     Hydromorphone      Levofloxacin Diarrhea     Morphine      Nausea and vomiting     Niaspan [Niacin] GI Disturbance     Flushing even at low dose, GI upset     Percocet [Oxycodone-Acetaminophen]      Vomiting after taking med couple of times     Vicodin [Hydrocodone-Acetaminophen] Nausea and Vomiting             Medications:    Medications that Require Transfusion:      Scheduled Medications:     insulin glargine  14 Units Subcutaneous QAM AC     pravastatin  10 mg Oral Daily     predniSONE  2.5 mg Oral Daily     tacrolimus  2 mg Oral BID     cloNIDine  0.1 mg Oral BID     ertapenem (INVanz) IV  1 g Intravenous Q24H     sirolimus  1.5 mg Oral Daily     labetalol  200 mg Oral Q12H HO     levothyroxine  50 mcg Oral QAM AC     insulin aspart  1-7 Units Subcutaneous TID AC     insulin aspart  1-5 Units Subcutaneous At Bedtime     insulin aspart  1-6 Units Subcutaneous Q4H             Review of Systems:   Confused and can not provide meaningful answers.                       Physical Exam:   Temp: 96.2  F (35.7  C) Temp src: Oral BP: (!) 188/92 Pulse: 72 Heart Rate: 89 Resp: 18 SpO2: 99 % O2 Device: None (Room air) Oxygen Delivery: 2 LPM    Wt Readings from Last 4 Encounters:   02/17/17 88.2 kg (194 lb 8 oz)   09/03/16 91.4 kg (201 lb 8 oz)   08/09/16 90.7 kg (200 lb)   06/22/16 89.8 kg (198 lb)       Constitutional: awake, alert, confused and slow answering questions. No apparent distress, bedridden, appears at stated age, obese.   Head, ENT, Eyes, and Neck: Normocephalic, wearing C-PAP mask.   Neck supple without rigidity.    Neurologic: difficult to assess the lower extremities due to chronic debilitation by moving upper extremities.    Lungs: CTA bilaterally anteriorly, no accessory muscle use.   CVS: RRR, normal S1/S2, no murmur, PMI was not appreciated.   Abdomen: tender in the RLQ where the transplanted kidney is, non-distended, no bruit, normal BS.   Genitalia: rico catheter in place, with maceration/yeast infection.   Extremities: +1-2 pitting edema of bilateral lower extremities, no ulcers, normal ROM of all joints, no swelling or erythema of any of joints and no tenderness to palpation.   Skin: stage 1 sacral decub, no induration, fluctuation or discharge, and no rash other than the yeast infection of the genitalia.             Data:   Inflammatory Markers    Recent Labs   Lab Test  08/31/16   0801  05/25/16   2116  02/18/16   1436  04/28/15   1650  12/29/14   1330  10/22/13   1245   SED   --   53*   --    --    --    --    CRP  39.0*  <2.9  8.8*  145.0*  114.0*  20.0*       Metabolic Studies       Recent Labs   Lab Test  02/17/17   0644  02/16/17   2303  02/16/17 2007 02/06/17   1200  01/09/17   1220  12/05/16   1210  11/14/16   1200  10/17/16   1000   09/05/16   0617   08/06/16   1900   02/25/14   1235   08/07/12   0647   NA  135  131*  129*  137  137   --   138  138   < >  135   < >   --    < >  136   < >  135   POTASSIUM  4.4   --   4.3  4.3  4.6   --   4.1  4.4   < >  4.0   < >   --    < >  3.7   < >  5.9*   CHLORIDE  97   --   92*  101  104   --   102  104   < >  103   < >   --    < >  96   < >  104   CO2  25   --   27  28  25   --   32  31   < >  21   < >   --    < >  30   < >  22   ANIONGAP  13   --   10  8  8   --   4  3   < >  11   < >   --    < >  10   < >  8   BUN  40*   --   38*  39*  42*   --   22  26   < >  16   < >   --    < >  19   < >  21   CR  1.05*   --   1.17*  1.04  1.37*   --   0.86  0.89   < >  0.54   < >   --    < >  0.84   < >  1.49*   GFRESTIMATED  52*   --   46*  52*  38*   --   66  63   < >  >90  Non  GFR Calc     < >   --    < >  68   < >  35*   GLC  354*   --   360*  288*  150*   --   107*  173*   < >  177*   < >   --    < >  85   < >  79   A1C   --    --    --    --    --   7.4*   --    --    --    --    --    --    < >   --    < >   --    ROGER  11.2*   --   11.1*  9.1  9.5   --   9.8  8.8   < >  8.4*   < >   --    < >  9.7   < >  8.6   PHOS   --   2.9   --    --    --    --    --    --    --    --    --    --    --   4.0   < >   --    MAG   --   2.0   --    --    --    --    --    --    --   2.2   < >   --    < >  1.6   < >   --    LACT   --    --   1.0   --    --    --    --    --    --    --    --   0.8   < >   --    < >   --    CKT   --    --    --    --    --    --    --    --    --     --    --    --    --    --    --   <20*    < > = values in this interval not displayed.       Hepatic Studies    Recent Labs   Lab Test  02/16/17 2007 09/05/16   0617  09/04/16   0815  09/03/16   1329  08/30/16   1709  08/30/16   0032  08/07/16   0750   BILITOTAL  0.5  0.9  1.1   --   0.5  0.4  0.4   ALKPHOS  80  53  50   --   69  69  54   ALBUMIN  2.8*  2.6*  2.9*   --   3.0*  3.2*  2.9*   AST  9  15  13   --   8  10  10   ALT  14  10  10   --   8  6  8   LDH   --    --    --   150  157   --    --        Pancreatitis testing    Recent Labs   Lab Test  12/05/16   1210  02/05/15   1250  09/02/13   0053  06/29/13   1711  05/16/13   1240  06/16/11   1230  11/07/10   1250 11/07/10   LIPASE   --    --   92  125   --    --    --    --    TRIG  272*  223*   --    --   262*  226*  221*  221       Hematology Studies      Recent Labs   Lab Test  02/17/17   0644  02/16/17   2007  02/15/17   1215  02/06/17   1200  01/09/17   1220  11/14/16   1200   10/10/16   1023   09/26/16   1104   09/16/16   0935   09/03/16   1034   WBC  6.1  7.0  5.0  4.8  4.0  4.7   < >  4.3   < >  3.0*   < >  1.8*   < >  2.1*   ANEU   --   4.7  3.2   --    --    --    --   2.4   --   1.6   --   0.7*   --   1.4*   ALYM   --   1.2  0.8   --    --    --    --   1.1   --   0.8   --   0.5*   --   0.3*   CARROLL   --   0.9  0.9   --    --    --    --   0.4   --   0.5   --   0.5   --   0.0   AEOS   --   0.1  0.1   --    --    --    --   0.4   --   0.1   --   0.1   --   0.2   HGB  11.6*  11.7  12.1  10.9*  11.0*  9.1*   < >  9.2*   < >  9.4*   < >  8.9*   < >  6.1*   HCT  36.2  35.9  37.6  34.8*  34.8*  30.4*   < >  29.2*   < >  29.4*   < >  27.2*   < >  18.8*   PLT  113*  134*  109*  123*  109*  195   < >  63*   < >  112*   < >  PLT CLUMPING SEEN ON SMEAR REVIEW BY RP SUGGEST REDRAW  CORRECTED ON 09/16 AT 1358: PREVIOUSLY REPORTED      < >  42*    < > = values in this interval not displayed.       Clotting Studies    Recent Labs   Lab Test  09/02/16   0838   09/01/16   1800  08/31/16   0801  08/30/16   1709  08/07/16   0750  08/06/16   1858   01/10/16   1408   08/06/12   1623   INR  1.09  1.13  0.98  1.01  1.12  1.09   < >  0.89   < >  0.98   PTT   --    --    --    --   38*  41*   --   35   --   32    < > = values in this interval not displayed.       Arterial Blood Gas Testing    Recent Labs   Lab Test  01/10/16   1455  03/18/14   1120  03/18/14   0115  05/19/13   0705 07/13/12   PH  7.40  7.42  7.42  7.36   --    PCO2  49*  40  43  50*   --    PO2  323*  176*  245*  126*   --    HCO3  30*  26  28  29*  <10   O2PER  58   --   15LPR  3LPM   --         Urine Studies     Recent Labs   Lab Test  02/16/17   2036  02/13/17   1845  08/30/16   2136  08/29/16   2305  08/06/16   1924   URINEPH  6.5  6.0  7.5*  6.0  6.0   NITRITE  Positive*  Positive*  Negative  Negative  Positive*   LEUKEST  Large*  Large*  Moderate*  Large*  Large*   WBCU  >182*  >182*  18*  30*  >182*       Vancomycin Levels     Recent Labs   Lab Test  08/29/13   1310   VANCOMYCIN  <5.0 Traditional Dosing therapeutic Range:         Trough 8-20 mg/L         Peak 20-50 mg/L       Tacrolimus levels    Invalid input(s): TACROLIMUS, TAC, TACR  Transplant Immunosuppression Labs Latest Ref Rng & Units 2/17/2017 2/16/2017 2/15/2017 2/6/2017 1/9/2017   Siro Level 5.0 - 15.0 ug/L - - - 5.1 4.0(L)   Siro Level - - - - 1200am [id number] midnight 1/9/2017   Tacro Level 5.0 - 15.0 ug/L - - - 4.4(L) 3.6(L)   Tacro Level - - - - [id number] 1200am midnight 1/9/2017   Creat 0.52 - 1.04 mg/dL 1.05(H) 1.17(H) - 1.04 1.37(H)   BUN 7 - 30 mg/dL 40(H) 38(H) - 39(H) 42(H)   WBC 4.0 - 11.0 10e9/L 6.1 7.0 5.0 4.8 4.0   Neutrophil % - 67.6 64.4 - -   ANEU 1.6 - 8.3 10e9/L - 4.7 3.2 - -         Microbiology:  Ucx  2/13/2017 polymicrobials with LFGNB X2-3 GPB    2/16/2017 pending    Blood cx 2/16/2017 negative to date     Last check of C difficile  C Diff Toxin B PCR   Date Value Ref Range Status   09/16/2016  NEG Final    Negative  Negative:  Clostridium difficile target DNA sequences NOT detected, presumed   negative for Clostridium difficile toxin B or the number of bacteria present   may be below the limit of detection for the test.   FDA approved assay performed using [employer] GeneXpert real-time PCR.   A negative result does not exclude actual disease due to Clostridium difficile   and may be due to improper collection, handling and storage of the specimen or   the number of organisms in the specimen is below the detection limit of the   assay.         Virology:  CMV Quantitative   Date Value Ref Range Status   07/18/2012 <100  No CMV DNA detected. <100 Copies/mL Final       No results found for: H6RES    EBV DNA Copies/mL   Date Value Ref Range Status   08/30/2016 2393 (A) EBVNEG [Copies]/mL Final   05/28/2016 9498 (A) EBVNEG [Copies]/mL Final       CMV Antibody IgG   Date Value Ref Range Status   09/01/2016 (H) 0.0 - 0.8 AI Final    >8.0  Positive   Antibody index (AI) values reflect qualitative changes in antibody   concentration that cannot be directly associated with clinical condition or   disease state.         Imaging:  [facility] 2/17/2017   IMPRESSION:  1. New hydronephrosis of the right lower quadrant transplant kidney.  2. Elevation of the resistive indices in the arcuate arteries, mildly  increased since prior.      Elaina Paez  Pager: [phone]  02/17/2017

## 2017-02-17 NOTE — ED NOTES
Pt arrived in the ED via [facility] with reports of AMS; per , pt is not far from her baseline and states when she is antibiotics she becomes confused.  EMS state that the visiting nurse felt like she should be evaluated despite  stating she was okay; visiting nurse changed out the pt's urinary catheter today.  Pt was started on antibiotics on 2/13/17 for UTI.  Pt was also found to be hypoxic, placed on 3 L NC with improvement.

## 2017-02-17 NOTE — ED NOTES
Pt's urinary catheter appeared not to be in place, as there was 2 chux pads and a sheet underneath her that were soaked with urine.  Writer deflated 30 mL from balloon and further inserted catheter, then inflated with 30 mL.  Pt cleaned and repositioned in bed.

## 2017-02-17 NOTE — PROGRESS NOTES
Medicine Daily Progress Note   02/17/2017    Gem Jade MRN# [id number]   Age: 71 year old YOB: 1945          Assessment and Plan:     72 yo F with kidney transplant on sirolimus/tacrolimus/prednisone and chronic indwelling rico with recurrent UTIs admitted with altered mental status, new transplant hydronephrosis, and likely pyelonephritis.     # Encephalopathy: presented due to concern for AMS. Found to have probable complicated UTI. Also with hypercalcemia as well. Also has known dysregulation of sleep/wake cycle. Please see management as noted below.     # Complicated UTI / pyelonephritis  # Transplant hydronephrosis:  Patient with hx of infection with ESBL E. Coli and multiple infections with Klebsiella and well as candida, concerning for both multidrug resistent Klebsiella (as pt has had multiple antibiotic exposures) and ESBL. Was on keflex outpatient, started on cefepime in ED.  Patient has new hydronephrosis in transplanted kidney per US and will likely require PNT. Dicussed with IR, plan for likely PNT on Monday. Also plan to assess for stone. XR negative. Plan for CT stone protocol.   - start ertapenem IV (2/17 -> )  - Transplant nephrology and surgery consulted  - Discussed with IR, PNT likely on Monday  - exchange rico today  - CT stone protocol   --Continue prednisone/tac/sirolimus    #Hypercalcemia: with low PTH. Concerning for vit d toxicity.   --check 1,25 Vit D, and 25 vit D, PTH like peptide  --IVFs  --recheck Ca tonight    # Normocytic Anemia: hgb 11.   --Check vit b12 and folate    # stable conditions  HTN - continue labetalol   DMII -  hypoglycemia protocol, continue home glargine, sliding and correction medium scale insulin  Bedbound - PT assessment was admitted  Chronic pain - hold gabapentin and oxcarbazepime for mental status and transient hyponatremia. Consider restarting      FEN: 75 cc/hr NS, replete PRN, NPO for mental status  Prophylaxis: DVT: SCDs GI: none at  this time Lung: continue home CPAP at night  Code Status: FULL CODE  Medical Decision Maker:     Patient was seen and discussed with Dr. Austen Barrientos  PGY-3 IM  [phone]     Subjective:     No acute events overnight. Found with CPAP off this AM. Very somnolent. Intervals of lucidness later in the morning. However, was still quite confused about where she was. No fevers or chills. No abdominal pain. No nausea or vomiting.     ROS: 4 system ROS was done. Pertinent positive and negatives noted above.         Physical Exam:   Vitals:  Temp:  [96.2  F (35.7  C)-97.9  F (36.6  C)] 97.9  F (36.6  C)  Pulse:  [72] 72  Heart Rate:  [65-89] 74  Resp:  [8-30] 16  BP: ()/(37-99) 182/75  SpO2:  [87 %-100 %] 99 %      Wt Readings from Last 4 Encounters:   02/17/17 88.2 kg (194 lb 8 oz)   09/03/16 91.4 kg (201 lb 8 oz)   08/09/16 90.7 kg (200 lb)   06/22/16 89.8 kg (198 lb)       Intake/Output Summary (Last 24 hours) at 02/17/17 1720  Last data filed at 02/17/17 1459   Gross per 24 hour   Intake              600 ml   Output             1125 ml   Net             -525 ml       Gen: somnolent, wakes for few minutes for conversation then falls back asleep  HEENT: lips extremely dry  Resp: clear lungs, few occasional exp wheezes b/l   CV: RRR; nml s1/s2, no m/r/g appreciated  Abdominal: Soft; NT, ND; NABS  /Rectal rico in place  Extremities: wwp, no edema in BLE  Neurological: wakes with some effort to voice, answers questions appropriately, but then falls back asleep         Laboratory Data:   ROUTINE ICU LABS (Last four results)    CMP  Recent Labs  Lab 02/17/17  0644 02/16/17  2303 02/16/17 2007    131* 129*   POTASSIUM 4.4  --  4.3   CHLORIDE 97  --  92*   CO2 25  --  27   ANIONGAP 13  --  10   *  --  360*   BUN 40*  --  38*   CR 1.05*  --  1.17*   GFRESTIMATED 52*  --  46*   GFRESTBLACK 63  --  55*   ROGER 11.2*  --  11.1*   MAG  --  2.0  --    PHOS 3.2 2.9  --    PROTTOTAL  --   --  8.2    ALBUMIN  --   --  2.8*   BILITOTAL  --   --  0.5   ALKPHOS  --   --  80   AST  --   --  9   ALT  --   --  14     CBC  Recent Labs  Lab 02/17/17  0644 02/16/17  2007 02/15/17  1215   WBC 6.1 7.0 5.0   RBC 4.02 3.98 4.18   HGB 11.6* 11.7 12.1   HCT 36.2 35.9 37.6   MCV 90 90 90   MCH 28.9 29.4 28.9   MCHC 32.0 32.6 32.2   RDW 14.0 14.2 13.8   * 134* 109*     INRNo lab results found in last 7 days.             Imaging:     Abd Xray - negative for stone

## 2017-02-17 NOTE — PLAN OF CARE
Problem: Goal Outcome Summary  Goal: Goal Outcome Summary  Outcome: No Change  Pt disoriented x3, hallucinating. De-stats into 80's if removes CPAP mask; currently on 2L via n.c. (CPAP off) and sating at 100%; BP hypertensive on a few readings ( but may be in part due to getting a bad reading; re-checks on legs were lower (latest 145/44). Pt somnolent. Arouses to verbal but drifts back to sleep. denies pain or difficulty breathing. She reported seeing people and a dog in the room that were not there. Easily redirected. Blood glucose 316; 4 units SS insulin given. Johnson leaking; MD aware; new Johnson ordered to be placed; eves will place. WOC nurse consulted; see WOC note for b/l buttock wounds/maceration care. Pt turned q2 w assist of 2.

## 2017-02-17 NOTE — ED NOTES
Patient's home health nurse called to inform ED staff that her catheter was not changed today.  Patient's primary nurse informed.

## 2017-02-17 NOTE — CONSULTS
Nephrology Initial Consult  February 17, 2017      Gem Jade MRN:[id number] YOB: 1945  Date of Admission:2/16/2017  Primary care provider: Marivel Barcenas  Requesting physician: Santosh Chen*    ASSESSMENT AND RECOMMENDATIONS:   1. LDKT: baseline Cr apeare to be in 0.6-0.9, with slighty elevated Cr of 1, found with new hydronephrosis on the U/S, and complicated UTI.  She has good Uout.   -will c/w treatment for UTI, will consult transplant surgery regarding the need for PNT on this patient.   -check BK   2.Immussupression: she is hemodynamic stable.Sirolimus and Tacrolimus levels at goal.. Will f/u. C/w same immunosuppression for now.   3.PPx: apeare she was not on Bactrim at home. Send CD4   4.UTI: complicated UTI, with new hydronephrosis, stable Cr, good Uout. Patient very confused, but appears she has a h/o KS as per patient. She also has elevated Ca, with low PTH. Will recommend a non-contrast CT to look for Ca stones. Also send Uric acid. Transplant surgery consult. C/w Ceftriaxone as per ID.   5.Hypercalcemia with low PTH: concerning for Vit D toxicity ( she was on vit D, unclear for how long, how much she was taking), malignancy. Send Vit D, 1,25 Vit D, and 25 vit D, send PTH like peptited ( orders in).   -start her on gentle hydration at 75 cc/h 1/2 NS, strict I@O . F/u Ca in the evening.   6. DM: not controled . Needs glucose controled to prevent overdiurases, dehydration. She is altered, unclear how much she can eat/ drink. Give fluids iv.   7.Anemia: we can check iron studies, folate, B12.   8.EBV viremia: in 2016, please check EBV PCR as patient also has been feeling tired. Check EBV PCR  also.   9.AMS: multifactorial: medication, hypercalcemia, infection. Managed by the primary team.     Recommendations were communicated to primary team by phone     Seen and discussed with Dr. Leobardo Malhotra MD   [phone]       REASON FOR CONSULT: URKT now  with UTI     HISTORY OF PRESENT ILLNESS:  Gem Jade is a 71 year old F with PMH of T2DM, with diabetic nephropathy now status post URLDK in 1999, with baseline Cr of 0.8-1, admitted on 2/17/17 with AMS, possible due to UTI vs medication ( she is on Tramadol, Oxacarbazepine, Clonidine, Gabapentin and Claritin). Also found with slightly elevated Ca, low PTH (suppressed ), and elevated glucose. No fever, hemodynamic stable. She was empirically started on Ceftriaxone for possible UTI.   Baseline Cr 0.8-1, on Sirolimus, Tacrolimus and Prednisone 2.5 mg QD.   Patient is altered, not able to give a story.     PAST MEDICAL HISTORY:  Reviewed with patient on 02/17/2017     Past Medical History   Diagnosis Date     Background diabetic retinopathy(362.01)      extensive diabetic retinopathy, s/p multiple laser treatments     Diabetic gastroparesis (H)      followed by [facility] (Dr. 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)      Urinary tract infection, site not specified      Chronic UTIs     UTI (urinary tract infection) due to urinary indwelling catheter (H)        Past Surgical History   Procedure Laterality Date     C nonspecific procedure  10/99     kidney transplant     C nonspecific procedure  8/00     MRI head, lumbar spine, pelvis     Hc left heart catheterization  1999     Cardiac Cath, Left Heart, Negative  (@ [facility])     C total knee arthroplasty  3/03     Knee Replacement, Total right, post op PE     Hc remv cataract extracap,insert lens   8/04     bilateral cataract repaired, left eye      Hc left heart catheterization  4/05     normal coronary angiogram at [facility], had mild troponin rise (0.71)     Cholecystectomy          MEDICATIONS:  PTA Meds  Prior to Admission medications    Medication Sig Last Dose Taking? Auth Provider   TraMADol HCl 50 MG TBDP Take 50 mg by mouth 3 times daily as needed   Marivel Barcenas APRN CNP   cephALEXin (KEFLEX) 500 MG capsule Take 1 capsule (500 mg) by mouth 3 times daily for 5 days   Marivel Barcenas APRN CNP   blood glucose monitoring (ACCU-CHEK COMPACT PLUS) test strip Use to test blood sugar 3 times daily or as directed.   Marivel Barcenas APRN CNP   blood glucose monitoring (ACCU-CHEK COMPACT CARE KIT) meter device kit Use to test blood sugar 3 times daily or as directed.   Marivel Barcenas APRN CNP   insulin aspart (NOVOLOG FLEXPEN) 100 UNIT/ML injection Inject 7 units before meals BID. At bedtime use following sliding scale:   150-200 5u  201-250 7u  251-300 9u  301-350 11u  351+ 13u      OXcarbazepine (TRILEPTAL) 300 MG tablet Take 1 tablet (300 mg) by mouth 2 times daily   Marivel Barcenas APRN CNP   levothyroxine (SYNTHROID) 50 MCG tablet Take 1 tablet (50 mcg) by mouth daily   Marivel Barcenas APRN CNP   cloNIDine (CATAPRES) 0.1 MG tablet Take 1 tablet (0.1 mg) by mouth 2 times daily   Marivel Barcenas APRN CNP   gabapentin (NEURONTIN) 300 MG capsule Take 3 tabs 2x daily   Marivel Barcenas APRN CNP   RAPAMUNE 1 MG PO TABLET Take 1 tablet (1 mg) by mouth daily GENERIC (total dose 1.5 mg daily)   Ross Elizabeth MD   tacrolimus (PROGRAF - GENERIC EQUIVALENT) 0.5 MG capsule HOLD   Ross Elizabeth MD   tacrolimus (PROGRAF - GENERIC EQUIVALENT) 1 MG capsule Take 2 capsules (2 mg) by mouth 2 times daily   Ross Elizabeth MD   RAPAMUNE 0.5 MG PO TABLET Take 1 tablet (0.5 mg) by mouth daily GENERIC (1.5 mg  "daily)   Ross lEizabeth MD   insulin glargine (LANTUS SOLOSTAR) 100 UNIT/ML PEN Inject 14 units under the skin daily.   Marivel Barcenas APRN CNP   labetalol (NORMODYNE) 200 MG tablet Take 1 tablet (200 mg) by mouth 2 times daily   Marivel Barcenas APRN CNP   cyanocobalamin (VITAMIN B12) 1000 MCG/ML injection Inject 1 mL (1,000 mcg) into the muscle every 30 days   Lorenzo Choudhury MD   syringe/needle, disp, (B-D SYRINGE/NEEDLE) 22G X 1-1/2\" 3 ML MISC Inject 1 Syringe into the muscle every 30 days   Lorenzo Choudhury MD   order for DME Equipment being ordered: [employer] FX CPAP interface (nasal pillows), size XS.   Lorenzo Choudhury MD   glucagon (GLUCAGON EMERGENCY) 1 MG injection Inject 1 mg into the muscle once   Barb Gordon APRN CNP   clotrimazole (LOTRIMIN) 1 % cream Apply topically 2 times daily Apply to buttocks twice a day until rash resolved.   Barb Gordon APRN CNP   loratadine (CLARITIN) 10 MG tablet Take 1 tablet (10 mg) by mouth daily   Marivel Barcenas APRN CNP   insulin pen needle 30G X 8 MM Use as directed with Lantus   Marivel Barcenas APRN CNP   pravastatin (PRAVACHOL) 10 MG tablet Take 1 tablet (10 mg) by mouth daily   Marivel Barcenas APRN CNP   ondansetron (ZOFRAN ODT) 4 MG disintegrating tablet Take 1-2 tablets (4-8 mg) by mouth every 8 hours as needed for nausea   Marivel Barcenas APRN CNP   simethicone (MYLICON) 125 MG CHEW Take 1 tablet (125 mg) by mouth as needed for intestinal gas      loperamide (IMODIUM) 2 MG capsule Take 1 capsule (2 mg) by mouth 3 times daily as needed for diarrhea   She Siddiqui MD   Lactobacillus Rhamnosus, GG, (CULTURELL) capsule Take 1 capsule by mouth 2 times daily   She Siddiqui MD   predniSONE (DELTASONE) 2.5 MG tablet Take 1 tablet (2.5 mg) by mouth daily   Monico Heck MD   Nystatin (NYSTOP) 166164 UNIT/GM POWD Externally apply 1 g topically 3 times daily as needed   " Marivel Barcenas APRN CNP   order for DME Equipment being ordered: Blue Perx  Please send to [facility]   Marivel Barcenas APRN CNP   alcohol swab prep pads Use to swab area of injection/yvon and vials as directed.   Marivel Barcenas APRN CNP   calcium-vitamin D (CALTRATE) 600-400 MG-UNIT per tablet Take 1 tablet by mouth 2 times daily   Unknown, Entered By History   Cranberry 400 MG TABS Take 400 mg by mouth 2 times daily   Unknown, Entered By History   benzonatate (TESSALON) 100 MG capsule Take 1 capsule (100 mg) by mouth 3 times daily as needed for cough   Marivel Barcenas APRN CNP   phenazopyridine (PYRIDIUM) 100 MG tablet Take 1 tablet (100 mg) by mouth 3 times daily as needed for irritation   Marivel Barcenas APRN CNP   famotidine (PEPCID) 20 MG tablet Take 1 tablet (20 mg) by mouth 2 times daily   Marivel Barcenas APRN CNP   Cholecalciferol (VITAMIN D) 2000 UNITS tablet Take 4,000 Units by mouth daily    Marivel Barcenas APRN CNP   ORDER FOR DME Equipment being ordered: Johnson Catheters - 18 french   Marivel Barcenas APRN CNP   ACE NOT PRESCRIBED, INTENTIONAL, 1 each continuous prn ACE Inhibitor not prescribed due to Worsening renal function on ACE/ARB therapy   Marivel Barcenas APRN CNP   ASPIRIN PO Take 81 mg by mouth daily.     Unknown, Entered By History   ACETAMINOPHEN PO Take 325-650 mg by mouth every 4 hours as needed.   Unknown, Entered By History      Current Meds    insulin glargine  14 Units Subcutaneous QAM AC     pravastatin  10 mg Oral Daily     predniSONE  2.5 mg Oral Daily     tacrolimus  2 mg Oral BID     cloNIDine  0.1 mg Oral BID     ertapenem (INVanz) IV  1 g Intravenous Q24H     sirolimus  1.5 mg Oral Daily     labetalol  200 mg Oral Q12H HO     levothyroxine  50 mcg Oral QAM AC     insulin aspart  1-7 Units Subcutaneous TID AC     insulin aspart  1-5 Units Subcutaneous At Bedtime      "insulin aspart  1-6 Units Subcutaneous Q4H     Infusion Meds       ALLERGIES:    Allergies   Allergen Reactions     Bactrim Ds [Sulfamethoxazole W/Trimethoprim]      Creatinine level increased     Atorvastatin Calcium      myalgias, muscle aches     Codeine Nausea and Vomiting     Darvocet [Propoxyphene N-Apap] Nausea and Vomiting     Hydromorphone      Levofloxacin Diarrhea     Morphine      Nausea and vomiting     Niaspan [Niacin] GI Disturbance     Flushing even at low dose, GI upset     Percocet [Oxycodone-Acetaminophen]      Vomiting after taking med couple of times     Vicodin [Hydrocodone-Acetaminophen] Nausea and Vomiting       REVIEW OF SYSTEMS:  Patient is not able to give a story     SOCIAL HISTORY:   Social History     Social History     Marital status:      Spouse name: N/A     Number of children: N/A     Years of education: N/A     Occupational History     Not on file.     Social History Main Topics     Smoking status: Former Smoker     Years: 17.00     Quit date: 1987     Smokeless tobacco: Not on file     Alcohol use No     Drug use: No     Sexual activity: Yes     Partners: Male     Other Topics Concern     Not on file     Social History Narrative     Reviewed with patient       FAMILY MEDICAL HISTORY:   Family History   Problem Relation Age of Onset     DIABETES Mother      DIABETES Brother      Hypertension Father      [id number] pneumonia     HEART DISEASE Father      Reviewed with patient     PHYSICAL EXAM:   Temp  Av.4  F (36.9  C)  Min: 96  F (35.6  C)  Max: 103  F (39.4  C)      Pulse  Av.6  Min: 63  Max: 119 Resp  Avg: 15  Min: 6  Max: 30  SpO2  Av.5 %  Min: 39 %  Max: 100 %  FiO2 (%)  Av %  Min: 21 %  Max: 21 %       BP (!) 188/92 (BP Location: Right arm)  Pulse 72  Temp 96.2  F (35.7  C) (Oral)  Resp 18  Ht 1.676 m (5' 6\")  Wt 88.2 kg (194 lb 8 oz)  SpO2 99%  BMI 31.39 kg/m2   Date 17 07 - 17 0659   Shift 4263-1927 6125-1430 4503-5405 24 Hour " Total   I  N  T  A  K  E   Shift Total  (mL/kg)       O  U  T  P  U  T   Urine 225   225    Shift Total  (mL/kg) 225  (2.55)   225  (2.55)   Weight (kg) 88.22 88.22 88.22 88.22        Admit Weight: 88.2 kg (194 lb 8 oz)     GENERAL APPEARANCE: altered, confused , on BIPAP   Pulmonary: lungs clear to auscultation with equal breath sounds bilaterally, no clubbing  CV: regular rhythm, normal rate, no rub   - JVP no    - Edema no   GI: soft, nontender, normal bowel sounds, no HSM   MS: no evidence of inflammation in joints, no muscle tenderness  NEURO: confused     LABS:   CMP  Recent Labs  Lab 02/17/17  0644 02/16/17 2303 02/16/17 2007    131* 129*   POTASSIUM 4.4  --  4.3   CHLORIDE 97  --  92*   CO2 25  --  27   ANIONGAP 13  --  10   *  --  360*   BUN 40*  --  38*   CR 1.05*  --  1.17*   GFRESTIMATED 52*  --  46*   GFRESTBLACK 63  --  55*   ROGER 11.2*  --  11.1*   MAG  --  2.0  --    PHOS  --  2.9  --    PROTTOTAL  --   --  8.2   ALBUMIN  --   --  2.8*   BILITOTAL  --   --  0.5   ALKPHOS  --   --  80   AST  --   --  9   ALT  --   --  14     CBC  Recent Labs  Lab 02/17/17  0644 02/16/17  2007 02/15/17  1215   HGB 11.6* 11.7 12.1   WBC 6.1 7.0 5.0   RBC 4.02 3.98 4.18   HCT 36.2 35.9 37.6   MCV 90 90 90   MCH 28.9 29.4 28.9   MCHC 32.0 32.6 32.2   RDW 14.0 14.2 13.8   * 134* 109*     INRNo lab results found in last 7 days.  ABGNo lab results found in last 7 days.   URINE STUDIES  Recent Labs   Lab Test  02/16/17 2036 02/13/17   1845  08/30/16   2136  08/29/16   2305   05/21/10   1553   COLOR  Yellow  Yellow  Light Yellow  Yellow   < >  Yellow   APPEARANCE  Cloudy  Cloudy  Clear  Slightly Cloudy   < >  Clear   URINEGLC  300*  >1000*  300*  30*   < >  Negative   URINEBILI  Negative  Negative  Negative  Negative   < >  Negative   URINEKETONE  10*  40*  Negative  Negative   < >  Negative   SG  >1.050*  1.010  1.006  1.012   < >  1.015   UBLD  Moderate*  Moderate*  Negative  Trace*   < >  Moderate*    URINEPH  6.5  6.0  7.5*  6.0   < >  6.0   PROTEIN  300*  300*  10*  30*   < >  30*   UROBILINOGEN   --    --    --    --    --   0.2   NITRITE  Positive*  Positive*  Negative  Negative   < >  Positive*   LEUKEST  Large*  Large*  Moderate*  Large*   < >  Moderate*   RBCU  >182*  75*  4*  16*   < >  *   WBCU  >182*  >182*  18*  30*   < >  *    < > = values in this interval not displayed.     Recent Labs   Lab Test  10/07/11   1230  09/27/11   1600   [facility]  Test canceled by PCU/Clinic  Charge credited PER RADHA FALCON  0.07     PTH  Recent Labs   Lab Test  02/16/17   2303  10/07/11   1215  09/27/11   1600   PTHI  9*  Test canceled by PCU/Clinic  Charge credited PER RADHA FALCON  53     IRON STUDIES  Recent Labs   Lab Test  09/04/16   0815  08/07/16   0750  08/09/12   0819  07/14/12   0720 05/08/12  10/07/11   1215  09/27/11   1600   IRON  124  12*  68  154  68  Test canceled by PCU/Clinic  Charge credited CANCELED PER RADHA FALCON CORRECTED ON 10/07 AT 1459: PREVIOUSLY REPORTED AS 69  26*   FEB  128*  166*  188*  169*   --   Test canceled by PCU/Clinic  Charge credited CANCELED PER RADHA FALCON CORRECTED ON 10/07 AT 1459: PREVIOUSLY REPORTED   227*   IRONSAT  97*  7*  36  91*   --   Test canceled by PCU/Clinic  Charge credited CANCELED PER RADHA FALCON CORRECTED ON 10/07 AT 1459: PREVIOUSLY REPORTED AS 30  12*   GIANA  2676*  645*   --   444*  630?   --   72       IMAGING:  All imaging studies reviewed by me.     Rochelle Malhotra MD    Attending Note: I have seen and examined this patient and the above note reflects my historical findings, exam findings, and assessment and plan.  Daily Booth MD

## 2017-02-17 NOTE — PROGRESS NOTES
[facility] Nurse Inpatient Wound Assessment     Initial Assessment of wound(s) on pt's:   Bilateral buttocks and perineal area, upper inner thighs        Data:   Patient History:      per MD note(s): This patient is a 71 year old female who who's bedridden due to neuropathy with indwelling rico catheter in place and recurrent UTI.      Moisture Management:  Urinary Catheter    Current Diet / Nutrition:           Active Diet Order      NPO Time Specified                Albino Assessment and sub scores:   Albino Score  Av.1  Min: 10  Max: 16     Labs:         Recent Labs   Lab Test  17   0644  17   1210   16   0838   16   0801   ALBUMIN   --   2.8*   --    --    < >   --    --    --    HGB  11.6*  11.7   < >   --    < >  7.3*   < >  8.3*   RBC  4.02  3.98   < >   --    < >  2.29*   < >  2.63*   WBC  6.1  7.0   < >   --    < >  2.3*   < >  2.2*   PLT  113*  134*   < >   --    < >  46*   < >  67*   INR   --    --    --    --    --   1.09   < >  0.98   A1C   --    --    --   7.4*   --    --    --    --    CRP   --    --    --    --    --    --    --   39.0*    < > = values in this interval not displayed.          Wound Assessment (location #1):   BL buttock, perineal area, upper inner thighs  Wound History: Pt is bedridden and rico's catheter is leaking constantly. RN planning on changing the catheter once have the orders from MD.        Specific Dimensions (length x width x depth, in cm) :   Right buttock- 1x3x0.1cm healing superficial skin erosion,     Left upper buttock- diffuse area of light maroon epidermis, hard to rule skin discoloration vs resolving DTPI (pt confused to obtain history)    Tunneling:  N/A    Undermining: N/A    Palpation of the wound bed:  normal    Slough appearance:  none    Eschar appearance:  none    Periwound Skin: intact and erythema,      Color: red    Temperature  normal     Drainage:  none    Odor: none    Pain:  minimal      Other- perineal area, upper inner thighs- moist macerated skin due to moisture          Intervention:     Patient's chart evaluated.      Wound(s) was assessed    Wound Care: was done:  Per POC mentioned below    Orders  Written    Supplies  Reviewed    Discussed plan of care with Patient and Nurse          Assessment:         Right buttock- Healing superficial skin erosion. Etiology- Consistent with moisture and Friction.  Left buttock- Unable to rule skin discoloration vs resolving DTPI  Both present on admission              Plan:     Nursing to notify the Provider(s) and re-consult the WOC Nurse if wound(s) deteriorate(s).  Plan of care for wound located on Bilateral buttocks and perineal area, upper inner thighs : Cleanse the area with Jermaine cleanse and protect, very gently with soft cloth.  Apply critic aid paste BID and PRN.  With repeat application, do not scrub the paste, only remove soiled paste and reapply.  If complete removal of paste is necessary use baby oil/mineral oil and soft wash cloth.    Turn and reposition Q 2hrs.    WOC Nurse will return: weekly     Face to face time: 25 mins

## 2017-02-17 NOTE — H&P
[facility]   Internal Medicine H&P     Patient Name: Gem Jade   : 1945, Age: 71 year old  MRN: [id number]  PCP: Marivel Barcenas    CC: Altered mental status    HPI: 72 yo F with DMII, HTN, kidney transplant () on sirolimus/tacrolimus/prednisone, chronic indwelling irco, recurrent UTIs, sleep apnea on cpap,  Morbid obesity, bedbound, and known irregular sleep pattern (awake at night, sleeps in day) who presents with altered mental status, predominantly somnolence. Ms. Jade was diagnosed with a UTI with Lactose fermenting gram negative rods on  and started on keflex. The morning of 17 her nurse felt that she had altered mental status and called 911 although her  felt that his wife gets confused every time she takes antibiotics. The nurse also replaced her rico that morning.    In the ED she was afebrile, hemodynamically stable,  was leaking urine around her rico and it was repositioned. HPI was limited by somnolence.    ROS: limited by somnolence    ALLERGIES:   Allergies   Allergen Reactions     Bactrim Ds [Sulfamethoxazole W/Trimethoprim]      Creatinine level increased     Atorvastatin Calcium      myalgias, muscle aches     Codeine Nausea and Vomiting     Darvocet [Propoxyphene N-Apap] Nausea and Vomiting     Hydromorphone      Levofloxacin Diarrhea     Morphine      Nausea and vomiting     Niaspan [Niacin] GI Disturbance     Flushing even at low dose, GI upset     Percocet [Oxycodone-Acetaminophen]      Vomiting after taking med couple of times     Vicodin [Hydrocodone-Acetaminophen] Nausea and Vomiting       Home Meds:  No current facility-administered medications on file prior to encounter.   Current Outpatient Prescriptions on File Prior to Encounter:  TraMADol HCl 50 MG TBDP Take 50 mg by mouth 3 times daily as needed   cephALEXin (KEFLEX) 500 MG capsule Take 1 capsule (500 mg) by mouth 3 times daily for 5 days   blood glucose  "monitoring (ACCU-CHEK COMPACT PLUS) test strip Use to test blood sugar 3 times daily or as directed.   blood glucose monitoring (ACCU-CHEK COMPACT CARE KIT) meter device kit Use to test blood sugar 3 times daily or as directed.   insulin aspart (NOVOLOG FLEXPEN) 100 UNIT/ML injection Inject 7 units before meals BID. At bedtime use following sliding scale: 150-200 [id number] 13u   OXcarbazepine (TRILEPTAL) 300 MG tablet Take 1 tablet (300 mg) by mouth 2 times daily   levothyroxine (SYNTHROID) 50 MCG tablet Take 1 tablet (50 mcg) by mouth daily   cloNIDine (CATAPRES) 0.1 MG tablet Take 1 tablet (0.1 mg) by mouth 2 times daily   gabapentin (NEURONTIN) 300 MG capsule Take 3 tabs 2x daily   RAPAMUNE 1 MG PO TABLET Take 1 tablet (1 mg) by mouth daily GENERIC (total dose 1.5 mg daily)   tacrolimus (PROGRAF - GENERIC EQUIVALENT) 0.5 MG capsule HOLD   tacrolimus (PROGRAF - GENERIC EQUIVALENT) 1 MG capsule Take 2 capsules (2 mg) by mouth 2 times daily   RAPAMUNE 0.5 MG PO TABLET Take 1 tablet (0.5 mg) by mouth daily GENERIC (1.5 mg daily)   insulin glargine (LANTUS SOLOSTAR) 100 UNIT/ML PEN Inject 14 units under the skin daily.   labetalol (NORMODYNE) 200 MG tablet Take 1 tablet (200 mg) by mouth 2 times daily   cyanocobalamin (VITAMIN B12) 1000 MCG/ML injection Inject 1 mL (1,000 mcg) into the muscle every 30 days   syringe/needle, disp, (B-D SYRINGE/NEEDLE) 22G X 1-1/2\" 3 ML MISC Inject 1 Syringe into the muscle every 30 days   order for DME Equipment being ordered: [employer] FX CPAP interface (nasal pillows), size XS.   glucagon (GLUCAGON EMERGENCY) 1 MG injection Inject 1 mg into the muscle once   clotrimazole (LOTRIMIN) 1 % cream Apply topically 2 times daily Apply to buttocks twice a day until rash resolved.   loratadine (CLARITIN) 10 MG tablet Take 1 tablet (10 mg) by mouth daily   insulin pen needle 30G X 8 MM Use as directed with Lantus   pravastatin (PRAVACHOL) 10 MG tablet Take 1 tablet (10 " mg) by mouth daily   ondansetron (ZOFRAN ODT) 4 MG disintegrating tablet Take 1-2 tablets (4-8 mg) by mouth every 8 hours as needed for nausea   simethicone (MYLICON) 125 MG CHEW Take 1 tablet (125 mg) by mouth as needed for intestinal gas   loperamide (IMODIUM) 2 MG capsule Take 1 capsule (2 mg) by mouth 3 times daily as needed for diarrhea   Lactobacillus Rhamnosus, GG, (CULTURELL) capsule Take 1 capsule by mouth 2 times daily   predniSONE (DELTASONE) 2.5 MG tablet Take 1 tablet (2.5 mg) by mouth daily   Nystatin (NYSTOP) 680902 UNIT/GM POWD Externally apply 1 g topically 3 times daily as needed   order for DME Equipment being ordered: [employer] send to [employer]   alcohol swab prep pads Use to swab area of injection/yvon and vials as directed.   calcium-vitamin D (CALTRATE) 600-400 MG-UNIT per tablet Take 1 tablet by mouth 2 times daily   Cranberry 400 MG TABS Take 400 mg by mouth 2 times daily   benzonatate (TESSALON) 100 MG capsule Take 1 capsule (100 mg) by mouth 3 times daily as needed for cough   phenazopyridine (PYRIDIUM) 100 MG tablet Take 1 tablet (100 mg) by mouth 3 times daily as needed for irritation   famotidine (PEPCID) 20 MG tablet Take 1 tablet (20 mg) by mouth 2 times daily   Cholecalciferol (VITAMIN D) 2000 UNITS tablet Take 4,000 Units by mouth daily    ORDER FOR DME Equipment being ordered: Johnson Catheters - 18 french   ACE NOT PRESCRIBED, INTENTIONAL, 1 each continuous prn ACE Inhibitor not prescribed due to Worsening renal function on ACE/ARB therapy   ASPIRIN PO Take 81 mg by mouth daily.     ACETAMINOPHEN PO Take 325-650 mg by mouth every 4 hours as needed.       PMedHx/PSurgHx:    Patient Active Problem List   Diagnosis     Kidney replaced by transplant     Primary localized osteoarthrosis, lower leg     Pulmonary embolism and infarction (H)     Diabetes mellitus with background retinopathy (H)     TYPE 2 DIABETES, HBA1C GOAL < 8%     HYPERLIPIDEMIA LDL GOAL <100     Diabetic  gastroparesis (H)     Back pain     Compression fx, thoracic spine (H)     Hyperkalemia     Anemia     Clostridium difficile diarrhea     Hydronephrosis     Recurrent UTI     Benign essential hypertension     Hyponatremia     Vancomycin resistant Enterococcus     Clostridium difficile colitis     Diabetic polyneuropathy associated with type 2 diabetes mellitus (H)     Chronic shoulder pain, unspecified laterality     Morbid obesity, unspecified obesity type (H)     Health Care Home     Gastroenteritis     Atrial fibrillation, transient (H)     Altered mental status     Acute kidney injury (H)     Pancytopenia, acquired (H)     Obstructive sleep apnea syndrome     Past Surgical History   Procedure Laterality Date     C nonspecific procedure  10/99     kidney transplant     C nonspecific procedure  8/00     MRI head, lumbar spine, pelvis     Hc left heart catheterization  1999     Cardiac Cath, Left Heart, Negative  (@ [facility])     C total knee arthroplasty  3/03     Knee Replacement, Total right, post op PE     Hc remv cataract extracap,insert lens  8/04     bilateral cataract repaired, left eye      Hc left heart catheterization  4/05     normal coronary angiogram at [facility], had mild troponin rise (0.71)     Cholecystectomy         SocHx:  Social History     Social History     Marital status:      Spouse name: N/A     Number of children: N/A     Years of education: N/A     Occupational History     Not on file.     Social History Main Topics     Smoking status: Former Smoker     Years: 17.00     Quit date: 6/1/1987     Smokeless tobacco: Not on file     Alcohol use No     Drug use: No     Sexual activity: Yes     Partners: Male     Other Topics Concern     Not on file     Social History Narrative       FamHx:  Family History   Problem Relation Age of Onset     DIABETES Mother      DIABETES Brother      Hypertension Father      [id number] pneumonia     HEART DISEASE Father         Vitals, Exam, Data     Physical  exam:  Temp:  [97.9  F (36.6  C)] 97.9  F (36.6  C)  Pulse:  [72] 72  Heart Rate:  [68-72] 68  Resp:  [14-30] 30  BP: ()/(66-86) 93/71  SpO2:  [87 %-100 %] 99 %     Wt Readings from Last 2 Encounters:   09/03/16 91.4 kg (201 lb 8 oz)   08/09/16 90.7 kg (200 lb)     No intake or output data in the 24 hours ending 02/16/17 4576    Physical Exam:   General: somnolent, NAD  HEENT: No Scleral icterus. NCAT, dry mucosa. PERRL though with small pupils.  Cardiac: Normal rate, regular rhythm. No m/r/g. Normal S1, S2.  Pulm: CTA in anterior fields, no wheezes, or crackles. Normal respiratory effort. Frequent apneic episodes of 5-10 seconds  Abd: Soft, non distended, obese, non tender abdomen. No lower quandrant tenderness  Skin: No jaundice, No rash, no echymosis  Extremities: 1+ mild LE edema, left foot turned outward  Joints: No inflammation noted on joints  Neuro: somnolent but arousable, oriented x2 (place, year) and following commands, then on later assessment by nurse oriented x4, not hyper-reflexive, able to move arms against gravity spontaneously  Psych:unable to assess    CBC  Recent Labs  Lab 02/16/17  2007 02/15/17  1215   WBC 7.0 5.0   HGB 11.7 12.1   MCV 90 90   * 109*       BMP  Recent Labs  Lab 02/16/17 2007   *   POTASSIUM 4.3   CHLORIDE 92*   CO2 27   ANIONGAP 10   *   BUN 38*   CR 1.17*   ROGER 11.1*     Later Na = 131  iCa 4.9  Phos 2.9  Mag 2.0  PTH 9 (L)  Vit D 25-OH pending     INRNo lab results found in last 7 days.    Liver panel  Recent Labs  Lab 02/16/17 2007   PROTTOTAL 8.2   ALBUMIN 2.8*   BILITOTAL 0.5   ALKPHOS 80   AST 9   ALT 14       Cultures  Blood Cx x1 collected only (antibiotics given prior to this writer's evaluation)  UA w/many WBC, RBCs, leuk esterase, nitrite, no bacteria noted  Urine Cx pending    Imaging:  Renal Tx US: The transplant kidney is located in the right lower quadrant, and measures 12.8 cm length. Parenchyma is of normal thickness and  echogenicity. No focal lesions. New hydronephrosis. No perinephric fluid collection.     Assessment & Plan     70 yo F with kidney transplant on sirolimus/tacrolimus/prednisone and chronic indwelling rico with recurrent UTIs admitted with altered mental status, new transplant hydronephrosis, and likely pyelonephritis.    # altered mental status, multifactorial, but most likely due to pyelonephritis  # hypovolemia, due to likely decreased intake w/AMS  # new transplant hydronephrosis  Patient with hx of infection with ESBL E. Coli and multiple infections with Klebsiella and well as candida, concerning for both multidrug resistent Klebsiella (as pt has had multiple antibiotic exposures) and ESBL. Was on keflex outpatient, started on cefepime in ED. Rec'd 500 cc NS bolus in ED. Patient has new hydronephrosis in transplanted kidney per US and will likely require PNT.  PLAN    - continue 75 cc/hr NS continuous for now, watch for hyperchloremia    - start ertapenem IV (2/17 -> )    - kidney transplant surgery team made aware and asked that transplant nephrology be consulted    - transplant ID consult for     - strict I&O    - continue home rico    # stable conditions  HTN - continue home meds  DMII - continue home glargine. While in ED: glargine in AM AC, POCT glucose checks, and monitor for hypoglycemia. Once to floor: hypoglycemia protocol, continue home glargine, sliding and correction medium scale insulin  Bedbound - PT assessment was admitted  Chronic pain - hold gabapentin and oxcarbazepime for mental status and transient hyponatremia. Consider restarting in AM    FEN: 75 cc/hr NS, replete PRN, NPO for mental status  Prophylaxis:  DVT: SCDs   GI: none at this time Lung: continue home CPAP at night  Code Status: FULL CODE  Medical Decision Maker:     Disposition: pending evaluation of transplant hydronephrosis    please contact [facility] cross cover pager with questions.    Natalia Drew MD,  PhD  PGY-1, Internal Medicine  [phone]    Patient was seen and discussed with attending physician Dr. Chen who agrees with above assessment and plan.

## 2017-02-18 LAB
ANION GAP SERPL CALCULATED.3IONS-SCNC: 8 MMOL/L (ref 3–14)
BACTERIA SPEC CULT: ABNORMAL
BACTERIA SPEC CULT: ABNORMAL
BUN SERPL-MCNC: 36 MG/DL (ref 7–30)
CALCIUM SERPL-MCNC: 10.3 MG/DL (ref 8.5–10.1)
CALCIUM SERPL-MCNC: 10.4 MG/DL (ref 8.5–10.1)
CD3 CELLS # BLD: 804 CELLS/UL (ref 603–2990)
CD3 CELLS NFR BLD: 73 % (ref 49–84)
CD3+CD4+ CELLS # BLD: 379 CELLS/UL (ref 441–2156)
CD3+CD4+ CELLS NFR BLD: 34 % (ref 28–63)
CD3+CD4+ CELLS/CD3+CD8+ CLL BLD: 0.89 % (ref 1.4–2.6)
CD3+CD8+ CELLS # BLD: 422 CELLS/UL (ref 125–1312)
CD3+CD8+ CELLS NFR BLD: 38 % (ref 10–40)
CHLORIDE SERPL-SCNC: 100 MMOL/L (ref 94–109)
CO2 SERPL-SCNC: 28 MMOL/L (ref 20–32)
CREAT SERPL-MCNC: 1.02 MG/DL (ref 0.52–1.04)
FERRITIN SERPL-MCNC: 1069 NG/ML (ref 8–252)
FOLATE SERPL-MCNC: 8.5 NG/ML
GFR SERPL CREATININE-BSD FRML MDRD: 54 ML/MIN/1.7M2
GLUCOSE BLDC GLUCOMTR-MCNC: 174 MG/DL (ref 70–99)
GLUCOSE BLDC GLUCOMTR-MCNC: 179 MG/DL (ref 70–99)
GLUCOSE BLDC GLUCOMTR-MCNC: 203 MG/DL (ref 70–99)
GLUCOSE BLDC GLUCOMTR-MCNC: 203 MG/DL (ref 70–99)
GLUCOSE BLDC GLUCOMTR-MCNC: 238 MG/DL (ref 70–99)
GLUCOSE BLDC GLUCOMTR-MCNC: 259 MG/DL (ref 70–99)
GLUCOSE SERPL-MCNC: 186 MG/DL (ref 70–99)
IFC SPECIMEN: ABNORMAL
IMMUNODEFICIENCY MARKERS SPEC-IMP: ABNORMAL
IRON SATN MFR SERPL: 29 % (ref 15–46)
IRON SERPL-MCNC: 50 UG/DL (ref 35–180)
Lab: ABNORMAL
Lab: ABNORMAL
MICRO REPORT STATUS: ABNORMAL
MICRO REPORT STATUS: ABNORMAL
MICROORGANISM SPEC CULT: ABNORMAL
POTASSIUM SERPL-SCNC: 3.7 MMOL/L (ref 3.4–5.3)
SIROLIMUS BLD-MCNC: 4.8 UG/L (ref 5–15)
SODIUM SERPL-SCNC: 135 MMOL/L (ref 133–144)
SPECIMEN SOURCE: ABNORMAL
SPECIMEN SOURCE: ABNORMAL
TACROLIMUS BLD-MCNC: 5.4 UG/L (ref 5–15)
TIBC SERPL-MCNC: 170 UG/DL (ref 240–430)
TME LAST DOSE: ABNORMAL H
TME LAST DOSE: NORMAL H
URATE SERPL-MCNC: 7.5 MG/DL (ref 2.6–6)
VIT B12 SERPL-MCNC: 1031 PG/ML (ref 193–986)

## 2017-02-18 PROCEDURE — 82542 COL CHROMOTOGRAPHY QUAL/QUAN: CPT | Performed by: INTERNAL MEDICINE

## 2017-02-18 PROCEDURE — 12000008 ZZH R&B INTERMEDIATE UMMC

## 2017-02-18 PROCEDURE — 00000146 ZZHCL STATISTIC GLUCOSE BY METER IP

## 2017-02-18 PROCEDURE — 36415 COLL VENOUS BLD VENIPUNCTURE: CPT | Performed by: INTERNAL MEDICINE

## 2017-02-18 PROCEDURE — 80195 ASSAY OF SIROLIMUS: CPT | Performed by: INTERNAL MEDICINE

## 2017-02-18 PROCEDURE — 25000132 ZZH RX MED GY IP 250 OP 250 PS 637: Mod: GY | Performed by: STUDENT IN AN ORGANIZED HEALTH CARE EDUCATION/TRAINING PROGRAM

## 2017-02-18 PROCEDURE — 99233 SBSQ HOSP IP/OBS HIGH 50: CPT | Mod: GC | Performed by: INTERNAL MEDICINE

## 2017-02-18 PROCEDURE — 80048 BASIC METABOLIC PNL TOTAL CA: CPT | Performed by: INTERNAL MEDICINE

## 2017-02-18 PROCEDURE — 27210995 ZZH RX 272: Performed by: INTERNAL MEDICINE

## 2017-02-18 PROCEDURE — 82306 VITAMIN D 25 HYDROXY: CPT | Performed by: INTERNAL MEDICINE

## 2017-02-18 PROCEDURE — A9270 NON-COVERED ITEM OR SERVICE: HCPCS | Mod: GY | Performed by: STUDENT IN AN ORGANIZED HEALTH CARE EDUCATION/TRAINING PROGRAM

## 2017-02-18 PROCEDURE — 80197 ASSAY OF TACROLIMUS: CPT | Performed by: INTERNAL MEDICINE

## 2017-02-18 PROCEDURE — 40000275 ZZH STATISTIC RCP TIME EA 10 MIN

## 2017-02-18 PROCEDURE — 25000131 ZZH RX MED GY IP 250 OP 636 PS 637: Mod: GY | Performed by: STUDENT IN AN ORGANIZED HEALTH CARE EDUCATION/TRAINING PROGRAM

## 2017-02-18 PROCEDURE — 36415 COLL VENOUS BLD VENIPUNCTURE: CPT

## 2017-02-18 PROCEDURE — 94660 CPAP INITIATION&MGMT: CPT

## 2017-02-18 PROCEDURE — 25000128 H RX IP 250 OP 636: Performed by: STUDENT IN AN ORGANIZED HEALTH CARE EDUCATION/TRAINING PROGRAM

## 2017-02-18 RX ADMIN — PREDNISONE 2.5 MG: 2.5 TABLET ORAL at 07:43

## 2017-02-18 RX ADMIN — KETOCONAZOLE: 20 CREAM TOPICAL at 07:43

## 2017-02-18 RX ADMIN — CLONIDINE HYDROCHLORIDE 0.1 MG: 0.1 TABLET ORAL at 07:43

## 2017-02-18 RX ADMIN — LABETALOL HCL 200 MG: 200 TABLET, FILM COATED ORAL at 20:39

## 2017-02-18 RX ADMIN — INSULIN GLARGINE 14 UNITS: 100 INJECTION, SOLUTION SUBCUTANEOUS at 07:42

## 2017-02-18 RX ADMIN — LEVOTHYROXINE SODIUM 50 MCG: 50 TABLET ORAL at 07:42

## 2017-02-18 RX ADMIN — LABETALOL HCL 200 MG: 200 TABLET, FILM COATED ORAL at 07:42

## 2017-02-18 RX ADMIN — TACROLIMUS 2 MG: 1 CAPSULE ORAL at 07:42

## 2017-02-18 RX ADMIN — ERTAPENEM SODIUM 1 G: 1 INJECTION, POWDER, LYOPHILIZED, FOR SOLUTION INTRAMUSCULAR; INTRAVENOUS at 01:57

## 2017-02-18 RX ADMIN — PRAVASTATIN SODIUM 10 MG: 10 TABLET ORAL at 07:42

## 2017-02-18 RX ADMIN — CLONIDINE HYDROCHLORIDE 0.1 MG: 0.1 TABLET ORAL at 20:39

## 2017-02-18 RX ADMIN — SIROLIMUS 1.5 MG: 1 TABLET, SUGAR COATED ORAL at 07:43

## 2017-02-18 RX ADMIN — INSULIN ASPART 2 UNITS: 100 INJECTION, SOLUTION INTRAVENOUS; SUBCUTANEOUS at 14:06

## 2017-02-18 RX ADMIN — TACROLIMUS 2 MG: 1 CAPSULE ORAL at 20:39

## 2017-02-18 RX ADMIN — SODIUM CHLORIDE: 4.5 INJECTION, SOLUTION INTRAVENOUS at 10:42

## 2017-02-18 NOTE — PLAN OF CARE
Problem: Goal Outcome Summary  Goal: Goal Outcome Summary  Outcome: No Change  0400 VSS, /50. Pt BP done on leg between 6326-2645 143/34 and 144/29. Not symptomatic. MD notified, no orders given. Calcium recheck 10.4-per MD no further orders and continue fluids for hypercalcemia.  , 2 units given @ 0200 and 0600. Rico w/ low urine output overnight-200 mL total. At 0600 pt had thick milky/cloudy urine output. Bladder scan showed no retention and no leaking from rico. Invanz given overnight. 1/2 NS infusing 75 mL/hr.  Denies pain and nausea. Repositioned q2h. Wound on sacrum cleansed and barrier applied. Bedfast. Repositioned q2h. CPAP on overnight. Continue to monitor and follow POC.

## 2017-02-18 NOTE — PLAN OF CARE
Problem: Goal Outcome Summary  Goal: Goal Outcome Summary     Pt disoriented x3. 02-Sats at 100% with 2L NC; CPAP needed overnight. Hypertensive at times, BPmax 208/50, Provider notified, PO hydralazine given. Pt somnolent. Arouses to verbal but drifts back to sleep quickly. Denies pain or difficulty breathing. Blood glucose 199 & 224. New Johnson placed due to leaking, no apparent leaking as of now. Review WOC note for buttock wounds/maceration care & perineal care. Pt turned q2 w assist of 2. Vaseline applied for dry/lips  Keshawn updated, will be in tomorrow. Continue with POC.

## 2017-02-18 NOTE — PLAN OF CARE
Problem: Goal Outcome Summary  Goal: Goal Outcome Summary  PT/7D: Initial evaluation received and appreciated. Per RN, unclear at this time appropriateness of pt for skilled physical therapy at this time. Will hold and check in tomorrow. Attempted to contact  to discuss DC plan however unavailable at this time.

## 2017-02-18 NOTE — PROGRESS NOTES
Medicine Daily Progress Note   02/18/2017    Gem Jade MRN# [id number]   Age: 71 year old YOB: 1945          Assessment and Plan:     70 yo F with kidney transplant on sirolimus/tacrolimus/prednisone and chronic indwelling rico with recurrent UTIs admitted with altered mental status, new transplant hydronephrosis, and likely pyelonephritis.     # Encephalopathy: presented due to concern for AMS. Found to have probable complicated UTI. Also with hypercalcemia as well. Also has known dysregulation of sleep/wake cycle. Please see management as noted below. Seems improved today.     # Complicated UTI / pyelonephritis  # Transplant hydronephrosis:  Patient with hx of infection with ESBL E. Coli and multiple infections with Klebsiella and well as candida, concerning for both multidrug resistent Klebsiella (as pt has had multiple antibiotic exposures) and ESBL. Was on keflex outpatient, started on cefepime in ED.  Patient has new hydronephrosis in transplanted kidney per US and will likely require PNT. Dicussed with IR, plan for likely PNT on Monday. Also plan to assess for stone. XR negative. Plan for CT stone protocol.   - start ertapenem IV (2/17 -> )  - Transplant nephrology and surgery consulted  - Discussed with IR, PNT possibly on Monday  - Rico has been exchanged  - CT stone protocol ---> non-obstructing stone. Mild to moderate hydro.  --Continue prednisone/tac/sirolimus  --Tac and sirolimus levels are pending    #Hypercalcemia: with low PTH. Concerning for vit d toxicity. Ca downtrending with fluids. Mentation somewhat improved.   --check 1,25 Vit D, and 25 vit D, PTH like peptide - pending  --Continue IVFs    # Normocytic Anemia: hgb 11.   Ferritin high, iron binding capacity low. C/w ACD  Folate normal. B12 high.     # stable conditions  HTN - continue labetalol   DMII -  hypoglycemia protocol, continue home glargine, sliding and correction medium scale insulin  Bedbound - PT assessment  was admitted  Chronic pain - hold gabapentin and oxcarbazepime for mental status and transient hyponatremia. Consider restarting      FEN: 75 cc/hr NS, replete PRN, bedside assessment for diet  Prophylaxis: DVT: SCDs GI: none at this time Lung: continue home CPAP at night  Code Status: FULL CODE      Patient was seen and discussed with Dr. Austen Barrientos  PGY-3   [phone]     Subjective:     No acute events overnight. Did receive hydralazine overnight for hypertension. More awake this am. Denies fevers or chills. No nausea, vomiting, or abdominal pain. Urine output a bit low overnight, but is picking up. Would like to try a diet today.    ROS: 4 system ROS was done. Pertinent positive and negatives noted above.         Physical Exam:   Vitals:  Temp:  [96.8  F (36  C)-98.3  F (36.8  C)] 98.3  F (36.8  C)  Pulse:  [64] 64  Heart Rate:  [67-77] 73  Resp:  [16-18] 18  BP: (140-208)/(29-75) 167/44  FiO2 (%):  [40 %] 40 %  SpO2:  [99 %-100 %] 100 %      Intake/Output Summary (Last 24 hours) at 02/18/17 1221  Last data filed at 02/18/17 0659   Gross per 24 hour   Intake          1973.75 ml   Output             1125 ml   Net           848.75 ml       Gen: sleeping but wakes more easily this AM  HEENT: lips extremely dry with cracks and scabbing  Resp: CTAB  CV: RRR; nml s1/s2, no m/r/g appreciated  Abdominal: Soft; NT, ND; NABS  /Rectal rico in place, urine a little bit cloudy  Extremities: wwp, no edema in BLE  Neurological: more alert today, speech is normal.          Laboratory Data:   ROUTINE ICU LABS (Last four results)    CMP    Recent Labs  Lab 02/18/17  0547 02/17/17  2358 02/17/17  0644 02/16/17  2303 02/16/17 2007     --  135 131* 129*   POTASSIUM 3.7  --  4.4  --  4.3   CHLORIDE 100  --  97  --  92*   CO2 28  --  25  --  27   ANIONGAP 8  --  13  --  10   *  --  354*  --  360*   BUN 36*  --  40*  --  38*   CR 1.02  --  1.05*  --  1.17*   GFRESTIMATED 54*  --  52*  --  46*    GFRESTBLACK 65  --  63  --  55*   ROGER 10.3* 10.4* 11.2*  --  11.1*   MAG  --   --   --  2.0  --    PHOS  --   --  3.2 2.9  --    PROTTOTAL  --   --   --   --  8.2   ALBUMIN  --   --   --   --  2.8*   BILITOTAL  --   --   --   --  0.5   ALKPHOS  --   --   --   --  80   AST  --   --   --   --  9   ALT  --   --   --   --  14     CBC    Recent Labs  Lab 02/17/17  0644 02/16/17  2007 02/15/17  1215   WBC 6.1 7.0 5.0   RBC 4.02 3.98 4.18   HGB 11.6* 11.7 12.1   HCT 36.2 35.9 37.6   MCV 90 90 90   MCH 28.9 29.4 28.9   MCHC 32.0 32.6 32.2   RDW 14.0 14.2 13.8   * 134* 109*     INRNo lab results found in last 7 days.             Imaging:        CT/abd      IMPRESSION:  1. New mild to moderate renal transplant hydronephrosis, without  hydroureter. A punctate nonobstructive stone is seen is the inferior  pole calyx, without stone identified in the renal pelvis or ureter to  explain obstruction. The urinary bladder is mildly distended. Mild  perinephric free fluid not significantly changed from 2013, otherwise  no significant change in right lower quadrant renal transplant  appearance.  2. Patchy opacities in the left lung base suspicious for infection.  Similar opacities were present in 2013.  3. 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 Continued attention on follow-up suggested.     I have personally reviewed the examination and initial interpretation  and I agree with the findings.

## 2017-02-18 NOTE — PLAN OF CARE
Problem: Goal Outcome Summary  Goal: Goal Outcome Summary  Outcome: No Change  AVSS, no c/o pain or nausea. Good UOP to Johnson.  , 4 units given, 2 per SS order and 2 per meal order; pt diet changed to adv by nurse; I advanced to full and pt tolerated pudding and gelatin. 1/2 NS infusing 75 mL/hr.Wound on sacrum cleansed and barrier applied. Two large incontinent stools back to back. Bedfast. Repositioned q2h. Continue to monitor and follow POC.

## 2017-02-19 LAB
ANION GAP SERPL CALCULATED.3IONS-SCNC: 9 MMOL/L (ref 3–14)
BUN SERPL-MCNC: 31 MG/DL (ref 7–30)
CALCIUM SERPL-MCNC: 10.2 MG/DL (ref 8.5–10.1)
CHLORIDE SERPL-SCNC: 101 MMOL/L (ref 94–109)
CO2 SERPL-SCNC: 26 MMOL/L (ref 20–32)
CREAT SERPL-MCNC: 0.91 MG/DL (ref 0.52–1.04)
GFR SERPL CREATININE-BSD FRML MDRD: 61 ML/MIN/1.7M2
GLUCOSE BLDC GLUCOMTR-MCNC: 150 MG/DL (ref 70–99)
GLUCOSE BLDC GLUCOMTR-MCNC: 151 MG/DL (ref 70–99)
GLUCOSE BLDC GLUCOMTR-MCNC: 152 MG/DL (ref 70–99)
GLUCOSE BLDC GLUCOMTR-MCNC: 158 MG/DL (ref 70–99)
GLUCOSE BLDC GLUCOMTR-MCNC: 167 MG/DL (ref 70–99)
GLUCOSE BLDC GLUCOMTR-MCNC: 172 MG/DL (ref 70–99)
GLUCOSE SERPL-MCNC: 139 MG/DL (ref 70–99)
POTASSIUM SERPL-SCNC: 3.4 MMOL/L (ref 3.4–5.3)
SODIUM SERPL-SCNC: 136 MMOL/L (ref 133–144)

## 2017-02-19 PROCEDURE — 25000131 ZZH RX MED GY IP 250 OP 636 PS 637: Mod: GY | Performed by: STUDENT IN AN ORGANIZED HEALTH CARE EDUCATION/TRAINING PROGRAM

## 2017-02-19 PROCEDURE — 25000128 H RX IP 250 OP 636: Performed by: STUDENT IN AN ORGANIZED HEALTH CARE EDUCATION/TRAINING PROGRAM

## 2017-02-19 PROCEDURE — 25000128 H RX IP 250 OP 636: Performed by: INTERNAL MEDICINE

## 2017-02-19 PROCEDURE — 99233 SBSQ HOSP IP/OBS HIGH 50: CPT | Mod: GC | Performed by: INTERNAL MEDICINE

## 2017-02-19 PROCEDURE — A9270 NON-COVERED ITEM OR SERVICE: HCPCS | Mod: GY | Performed by: INTERNAL MEDICINE

## 2017-02-19 PROCEDURE — A9270 NON-COVERED ITEM OR SERVICE: HCPCS | Mod: GY | Performed by: STUDENT IN AN ORGANIZED HEALTH CARE EDUCATION/TRAINING PROGRAM

## 2017-02-19 PROCEDURE — 27210995 ZZH RX 272: Performed by: INTERNAL MEDICINE

## 2017-02-19 PROCEDURE — 12000008 ZZH R&B INTERMEDIATE UMMC

## 2017-02-19 PROCEDURE — 36415 COLL VENOUS BLD VENIPUNCTURE: CPT | Performed by: INTERNAL MEDICINE

## 2017-02-19 PROCEDURE — 25000132 ZZH RX MED GY IP 250 OP 250 PS 637: Mod: GY | Performed by: INTERNAL MEDICINE

## 2017-02-19 PROCEDURE — 00000146 ZZHCL STATISTIC GLUCOSE BY METER IP

## 2017-02-19 PROCEDURE — 25000132 ZZH RX MED GY IP 250 OP 250 PS 637: Mod: GY | Performed by: STUDENT IN AN ORGANIZED HEALTH CARE EDUCATION/TRAINING PROGRAM

## 2017-02-19 PROCEDURE — 80048 BASIC METABOLIC PNL TOTAL CA: CPT | Performed by: INTERNAL MEDICINE

## 2017-02-19 RX ORDER — CEFTRIAXONE 1 G/1
1 INJECTION, POWDER, FOR SOLUTION INTRAMUSCULAR; INTRAVENOUS EVERY 24 HOURS
Status: DISCONTINUED | OUTPATIENT
Start: 2017-02-19 | End: 2017-02-20 | Stop reason: HOSPADM

## 2017-02-19 RX ORDER — SIMETHICONE 80 MG
80 TABLET,CHEWABLE ORAL EVERY 6 HOURS PRN
Status: DISCONTINUED | OUTPATIENT
Start: 2017-02-19 | End: 2017-02-20 | Stop reason: HOSPADM

## 2017-02-19 RX ADMIN — LEVOTHYROXINE SODIUM 50 MCG: 50 TABLET ORAL at 09:41

## 2017-02-19 RX ADMIN — CEFTRIAXONE 1 G: 1 INJECTION, POWDER, FOR SOLUTION INTRAMUSCULAR; INTRAVENOUS at 20:29

## 2017-02-19 RX ADMIN — LABETALOL HCL 200 MG: 200 TABLET, FILM COATED ORAL at 09:41

## 2017-02-19 RX ADMIN — PREDNISONE 2.5 MG: 2.5 TABLET ORAL at 09:40

## 2017-02-19 RX ADMIN — SODIUM CHLORIDE: 4.5 INJECTION, SOLUTION INTRAVENOUS at 00:17

## 2017-02-19 RX ADMIN — INSULIN ASPART 1 UNITS: 100 INJECTION, SOLUTION INTRAVENOUS; SUBCUTANEOUS at 17:34

## 2017-02-19 RX ADMIN — LABETALOL HCL 200 MG: 200 TABLET, FILM COATED ORAL at 20:27

## 2017-02-19 RX ADMIN — CLONIDINE HYDROCHLORIDE 0.1 MG: 0.1 TABLET ORAL at 20:27

## 2017-02-19 RX ADMIN — Medication: at 15:49

## 2017-02-19 RX ADMIN — TACROLIMUS 2 MG: 1 CAPSULE ORAL at 09:40

## 2017-02-19 RX ADMIN — INSULIN ASPART 1 UNITS: 100 INJECTION, SOLUTION INTRAVENOUS; SUBCUTANEOUS at 11:54

## 2017-02-19 RX ADMIN — KETOCONAZOLE: 20 CREAM TOPICAL at 09:41

## 2017-02-19 RX ADMIN — ERTAPENEM SODIUM 1 G: 1 INJECTION, POWDER, LYOPHILIZED, FOR SOLUTION INTRAMUSCULAR; INTRAVENOUS at 01:42

## 2017-02-19 RX ADMIN — INSULIN GLARGINE 14 UNITS: 100 INJECTION, SOLUTION SUBCUTANEOUS at 09:42

## 2017-02-19 RX ADMIN — SIROLIMUS 1.5 MG: 1 TABLET, SUGAR COATED ORAL at 09:41

## 2017-02-19 RX ADMIN — CLONIDINE HYDROCHLORIDE 0.1 MG: 0.1 TABLET ORAL at 09:38

## 2017-02-19 RX ADMIN — TACROLIMUS 2 MG: 1 CAPSULE ORAL at 20:27

## 2017-02-19 RX ADMIN — PRAVASTATIN SODIUM 10 MG: 10 TABLET ORAL at 09:39

## 2017-02-19 NOTE — PLAN OF CARE
Problem: Goal Outcome Summary  Goal: Goal Outcome Summary  PT/7D:  Initial evaluation received and appreciated. At this time Pt is not appropriate for skilled physical therapy and is at baseline level of function and has all cares in place at home. Per RN pt not appropriate at this time. PT to defer turning and bed mobiltiy to nursing staff

## 2017-02-19 NOTE — PROGRESS NOTES
Medicine Daily Progress Note   02/19/2017    Gem Jade MRN# [id number]   Age: 71 year old YOB: 1945          Assessment and Plan:     72 yo F with kidney transplant on sirolimus/tacrolimus/prednisone and chronic indwelling rico with recurrent UTIs admitted with altered mental status, new transplant hydronephrosis, and likely pyelonephritis.     # Encephalopathy: - resolved. presented due to concern for AMS. Found to have probable complicated UTI. Also with hypercalcemia as well. Also has known dysregulation of sleep/wake cycle. Please see management as noted below.     # Complicated UTI / pyelonephritis  # Transplant hydronephrosis:  Patient with hx of infection with ESBL E. Coli and multiple infections with Klebsiella and well as candida, concerning for both multidrug resistent Klebsiella (as pt has had multiple antibiotic exposures) and ESBL. Was on keflex outpatient, started on cefepime in ED.  Patient has new hydronephrosis in transplanted kidney per US. Rico has been exchanged. CT with non-obstructing stone. Discussed with nephrology, no plan for PNT.   - start ertapenem IV (2/17 -> 2/19)  - Urine culture with E coli, sensitive to cephalosporins  - Change to ceftriaxone, plan for 14 day course (may be able to complete with cipro)  - Transplant nephrology and surgery consulted  - CT stone protocol ---> non-obstructing stone. Mild to moderate hydro.  --Continue prednisone/tac/sirolimus  --Tac 5.4, sirolimus 4.8    #Hypercalcemia: with low PTH. Concerning for vit d toxicity. Ca downtrending with fluids. Mentation somewhat improved.   --check 1,25 Vit D, and 25 vit D, PTH like peptide - pending  --stop fluid today    # Normocytic Anemia: hgb 11.   Ferritin high, iron binding capacity low. C/w ACD  Folate normal. B12 high.     # stable conditions  HTN - continue labetalol   DMII -  hypoglycemia protocol, continue home glargine, and sliding scale  Bedbound - PT assessment tomorrow  Chronic  pain - hold gabapentin and oxcarbazepime for mental status and transient hyponatremia. Consider restarting      FEN: 75 cc/hr NS, replete PRN, bedside assessment for diet  Prophylaxis: DVT: SCDs GI: none at this time Lung: continue home CPAP at night  Code Status: FULL CODE    Dispo: likely dc in 1-2 days    Patient was seen and discussed with Dr. Austen Barrientos  PGY-3 IM  [phone]     Subjective:     No acute events overnight. Much more alert and conversant. No fevers. Continues to have chills. No abd pain. Rico draining adequately.     ROS: 4 system ROS was done. Pertinent positive and negatives noted above.         Physical Exam:   Vitals:  Temp:  [97.3  F (36.3  C)-98  F (36.7  C)] 97.3  F (36.3  C)  Pulse:  [66-76] 66  Heart Rate:  [71-82] 82  Resp:  [18] 18  BP: (124-195)/(44-86) 138/82  SpO2:  [94 %-100 %] 94 %      Gen: alert, resting in bed, no distress  Resp: CTAB  CV: RRR; nml s1/s2, no m/r/g appreciated  Abdominal: Soft; NT, ND; NABS  /Rectal rico in place, clear urine  Extremities: wwp, no edema in BLE  Neurological: alert and oriented         Laboratory Data:   ROUTINE ICU LABS (Last four results)    CMP    Recent Labs  Lab 02/19/17  0440 02/18/17  0547 02/17/17  2358 02/17/17  0644 02/16/17  2303 02/16/17 2007    135  --  135 131* 129*   POTASSIUM 3.4 3.7  --  4.4  --  4.3   CHLORIDE 101 100  --  97  --  92*   CO2 26 28  --  25  --  27   ANIONGAP 9 8  --  13  --  10   * 186*  --  354*  --  360*   BUN 31* 36*  --  40*  --  38*   CR 0.91 1.02  --  1.05*  --  1.17*   GFRESTIMATED 61 54*  --  52*  --  46*   GFRESTBLACK 74 65  --  63  --  55*   ROGER 10.2* 10.3* 10.4* 11.2*  --  11.1*   MAG  --   --   --   --  2.0  --    PHOS  --   --   --  3.2 2.9  --    PROTTOTAL  --   --   --   --   --  8.2   ALBUMIN  --   --   --   --   --  2.8*   BILITOTAL  --   --   --   --   --  0.5   ALKPHOS  --   --   --   --   --  80   AST  --   --   --   --   --  9   ALT  --   --   --   --   --  14      CBC    Recent Labs  Lab 02/17/17  0644 02/16/17  2007 02/15/17  1215   WBC 6.1 7.0 5.0   RBC 4.02 3.98 4.18   HGB 11.6* 11.7 12.1   HCT 36.2 35.9 37.6   MCV 90 90 90   MCH 28.9 29.4 28.9   MCHC 32.0 32.6 32.2   RDW 14.0 14.2 13.8   * 134* 109*     INRNo lab results found in last 7 days.             Imaging:        CT/abd      IMPRESSION:  1. New mild to moderate renal transplant hydronephrosis, without  hydroureter. A punctate nonobstructive stone is seen is the inferior  pole calyx, without stone identified in the renal pelvis or ureter to  explain obstruction. The urinary bladder is mildly distended. Mild  perinephric free fluid not significantly changed from 2013, otherwise  no significant change in right lower quadrant renal transplant  appearance.  2. Patchy opacities in the left lung base suspicious for infection.  Similar opacities were present in 2013.  3. 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 Continued attention on follow-up suggested.     I have personally reviewed the examination and initial interpretation  and I agree with the findings.

## 2017-02-19 NOTE — PLAN OF CARE
Problem: Confusion, Acute (Adult)  Goal: Cognitive/Functional Impairments Minimized  Patient will demonstrate the desired outcomes by discharge/transition of care.      Pt has greatly improved cognitive level over the past two days. Much more lucid and A&O.

## 2017-02-19 NOTE — PROGRESS NOTES
[facility]    Transplant Infectious Diseases Inpatient Progress Note      Gem Jade MRN# [id number]   YOB: 1945 Age: 71 year old   Date of Admission and time: 2/16/2017  6:20 PM             Recommendations:   1. DC ertapenem.   2. Start ceftriaxone 1 gram daily.   - duration of therapy is 14 days.   - she can be DCed on either cipro 500 mg bid or levaquin 500 mg daily for total of 14 days (till 2/23/2017). If the family is concerned about diarrhea (levaquin listed as allergy with diarrhea), then she can be DCed with PICC line and ceftriaxone 1 gram daily for total of 14 days (till 2/23/2017).         Summary of Presentation:   Transplants:  10/27/1999 (Kidney), Postoperative day:  6325     This patient is a 71 year old female who's bedridden due to neuropathy with indwelling rico catheter in place and recurrent UTI.   Presented with altered mental status. Was found to have hydronephrosis of transplanted kidney.         Active Problems and Infectious Diseases Issues:   1. Rico-induced UTI with pyelonephritis.  2. Encephalopathy.   3. Hydronephrosis.     The patient has history of recurrent UTI. Now with hydronephrosis of the transplanted kidney and altered mental status.    No clear etiology of the hydronephrosis with CT negative for obstructing kidney stones, it was positive though for non-obstructing kidney stones.   Unfortunately with indwelling rico catheter the risk of recurrent UTI can not be eliminated.   Also the non-obstructing kidney stones are always a nidus for colonization.   Encephalopathy resolved.     4. Yeast infection of the genitalia  On topical antifungal powder.         Old Problems and Infectious Diseases Issues:   1. Recurrent UTI.   2. Encephalopathy in September of 2016 with no clear etiology.   3. Bedridden with indwelling rico catheter.     Other Infectious Disease issues include:  - PJP prophylaxis: non at this point.   - Serostatus:  CMV D?/R?, EBV D?/R?, received zostavax in 2013.   - Immunization status: Up-to-date      Discussed with primary service.   Attestation:  I interviewed the patient and obtained history from the patient, and by reviewing the patient's chart, microbiological data, and radiological data. All data are summarized in this notes.  Elaina Parisid   Pager: [phone]  02/19/2017           Interim History:   Patient's encephalopathy has resolved. Afebrile.   C/o chronic left shoulder pain, worse with movement, better with pain meds. No other complaints.  Large soft BM per RN but no diarrhea. No N/V. No abdominal pain.           History of Present Illness:   Transplants:  10/27/1999 (Kidney), Postoperative day:  6325     This patient is a 71 year old female who who's bedridden due to neuropathy with indwelling rico catheter in place and recurrent UTI.     She was found to have UTI with positive UA and Ucx (polymicrobial) on 2/13/2017, she was started on keflex but was found to be more confused by family,  thought it was the new ABx that was causing the confusion.   The patient was brought to ED where she was found to have new hydronephrosis of the transplanted kidney, UA was positive. The patient was admitted on ertapenem for possible infection with ESBL GN.     The patient remains confused, she is not able to provide with meaningful history. She does answer questions but not appropriately.                    Medications:   Medications that Require Transfusion:     NaCl 75 mL/hr at 02/19/17 0017     Scheduled Medications:     insulin glargine  14 Units Subcutaneous QAM AC     pravastatin  10 mg Oral Daily     predniSONE  2.5 mg Oral Daily     tacrolimus  2 mg Oral BID     cloNIDine  0.1 mg Oral BID     ertapenem (INVanz) IV  1 g Intravenous Q24H     sirolimus  1.5 mg Oral Daily     labetalol  200 mg Oral Q12H HO     levothyroxine  50 mcg Oral QAM AC     insulin aspart  1-7 Units Subcutaneous TID AC     insulin  aspart  1-5 Units Subcutaneous At Bedtime     insulin aspart  1-6 Units Subcutaneous Q4H     ketoconazole   Topical Daily        ROS:  As mentioned in the interim history otherwise negative by reviewing central and peripheral neurological systems, respiratory system, cardiac system, GI system,  system, musculoskeletal, skin, allergy, and lymphatics.                  Physical Exam:   Temp: 97.7  F (36.5  C) Temp src: Oral BP: 143/52 Pulse: 66 Heart Rate: 78 Resp: 18 SpO2: 97 % O2 Device: None (Room air)      Wt Readings from Last 4 Encounters:   02/19/17 92.8 kg (204 lb 8 oz)   09/03/16 91.4 kg (201 lb 8 oz)   08/09/16 90.7 kg (200 lb)   06/22/16 89.8 kg (198 lb)     Constitutional: awake, alert. No apparent distress, bedridden, appears at stated age, obese.    Lungs: CTA bilaterally anteriorly, no accessory muscle use.   CVS: RRR, normal S1/S2, no murmur, PMI was not appreciated.   Abdomen: mild tenderness in the RLQ where the transplanted kidney is, non-distended, no bruit, normal BS.   Genitalia: rico catheter in place, with maceration/yeast infection.   Extremities: +1-2 pitting edema of bilateral lower extremities, no ulcers, normal ROM of all joints, no swelling or erythema of any of joints and no tenderness to palpation.   Skin: stage 1 sacral decub, no induration, fluctuation or discharge, and no rash other than the yeast infection of the genitalia.            Data:   Inflammatory Markers    Recent Labs   Lab Test  08/31/16   0801  05/25/16   2116  02/18/16   1436  04/28/15   1650  12/29/14   1330  10/22/13   1245   SED   --   53*   --    --    --    --    CRP  39.0*  <2.9  8.8*  145.0*  114.0*  20.0*       Metabolic Studies       Recent Labs   Lab Test  02/19/17   0440  02/18/17   0547  02/17/17   2358  02/17/17   0644  02/16/17   2303  02/16/17   2007  02/06/17   1200  01/09/17   1220  12/05/16   1210   09/05/16   0617   08/06/16   1900   08/07/12   0647   NA  136  135   --   135  131*  129*  137  137    --    < >  135   < >   --    < >  135   POTASSIUM  3.4  3.7   --   4.4   --   4.3  4.3  4.6   --    < >  4.0   < >   --    < >  5.9*   CHLORIDE  101  100   --   97   --   92*  101  104   --    < >  103   < >   --    < >  104   CO2  26  28   --   25   --   27  28  25   --    < >  21   < >   --    < >  22   ANIONGAP  9  8   --   13   --   10  8  8   --    < >  11   < >   --    < >  8   BUN  31*  36*   --   40*   --   38*  39*  42*   --    < >  16   < >   --    < >  21   CR  0.91  1.02   --   1.05*   --   1.17*  1.04  1.37*   --    < >  0.54   < >   --    < >  1.49*   GFRESTIMATED  61  54*   --   52*   --   46*  52*  38*   --    < >  >90  Non  GFR Calc     < >   --    < >  35*   GLC  139*  186*   --   354*   --   360*  288*  150*   --    < >  177*   < >   --    < >  79   A1C   --    --    --    --    --    --    --    --   7.4*   --    --    --    --    < >   --    ROGER  10.2*  10.3*  10.4*  11.2*   --   11.1*  9.1  9.5   --    < >  8.4*   < >   --    < >  8.6   PHOS   --    --    --   3.2  2.9   --    --    --    --    --    --    --    --    < >   --    MAG   --    --    --    --   2.0   --    --    --    --    --   2.2   < >   --    < >   --    LACT   --    --    --    --    --   1.0   --    --    --    --    --    --   0.8   < >   --    CKT   --    --    --    --    --    --    --    --    --    --    --    --    --    --   <20*    < > = values in this interval not displayed.       Hepatic Studies    Recent Labs   Lab Test  02/16/17 2007 09/05/16 0617 09/04/16 0815  09/03/16   1329  08/30/16   1709  08/30/16   0032  08/07/16   0750   BILITOTAL  0.5  0.9  1.1   --   0.5  0.4  0.4   ALKPHOS  80  53  50   --   69  69  54   ALBUMIN  2.8*  2.6*  2.9*   --   3.0*  3.2*  2.9*   AST  9  15  13   --   8  10  10   ALT  14  10  10   --   8  6  8   LDH   --    --    --   150  157   --    --        Pancreatitis testing    Recent Labs   Lab Test  12/05/16   1210  02/05/15   1250  09/02/13   0053   06/29/13   1711  05/16/13   1240  06/16/11   1230  11/07/10   1250 11/07/10   LIPASE   --    --   92  125   --    --    --    --    TRIG  272*  223*   --    --   262*  226*  221*  221       Hematology Studies      Recent Labs   Lab Test  02/17/17   0644  02/16/17   2007  02/15/17   1215  02/06/17   1200  01/09/17   1220  11/14/16   1200   10/10/16   1023   09/26/16   1104   09/16/16   0935   09/03/16   1034   WBC  6.1  7.0  5.0  4.8  4.0  4.7   < >  4.3   < >  3.0*   < >  1.8*   < >  2.1*   ANEU   --   4.7  3.2   --    --    --    --   2.4   --   1.6   --   0.7*   --   1.4*   ALYM   --   1.2  0.8   --    --    --    --   1.1   --   0.8   --   0.5*   --   0.3*   CARROLL   --   0.9  0.9   --    --    --    --   0.4   --   0.5   --   0.5   --   0.0   AEOS   --   0.1  0.1   --    --    --    --   0.4   --   0.1   --   0.1   --   0.2   HGB  11.6*  11.7  12.1  10.9*  11.0*  9.1*   < >  9.2*   < >  9.4*   < >  8.9*   < >  6.1*   HCT  36.2  35.9  37.6  34.8*  34.8*  30.4*   < >  29.2*   < >  29.4*   < >  27.2*   < >  18.8*   PLT  113*  134*  109*  123*  109*  195   < >  63*   < >  112*   < >  PLT CLUMPING SEEN ON SMEAR REVIEW BY  SUGGEST REDRAW  CORRECTED ON 09/16 AT 1358: PREVIOUSLY REPORTED      < >  42*    < > = values in this interval not displayed.       Clotting Studies    Recent Labs   Lab Test  09/02/16   0838  09/01/16   1800  08/31/16   0801  08/30/16   1709  08/07/16   0750  08/06/16   1858   01/10/16   1408   08/06/12   1623   INR  1.09  1.13  0.98  1.01  1.12  1.09   < >  0.89   < >  0.98   PTT   --    --    --    --   38*  41*   --   35   --   32    < > = values in this interval not displayed.       Arterial Blood Gas Testing    Recent Labs   Lab Test  01/10/16   1455  03/18/14   1120  03/18/14   0115  05/19/13   0705 07/13/12   PH  7.40  7.42  7.42  7.36   --    PCO2  49*  40  43  50*   --    PO2  323*  176*  245*  126*   --    HCO3  30*  26  28  29*  <10   O2PER  58   --   15LPR  3LPM   --         Urine  Studies     Recent Labs   Lab Test  02/16/17 2036  02/13/17   1845  08/30/16   2136  08/29/16   2305  08/06/16   1924   URINEPH  6.5  6.0  7.5*  6.0  6.0   NITRITE  Positive*  Positive*  Negative  Negative  Positive*   LEUKEST  Large*  Large*  Moderate*  Large*  Large*   WBCU  >182*  >182*  18*  30*  >182*       Vancomycin Levels     Recent Labs   Lab Test  08/29/13   1310   VANCOMYCIN  <5.0 Traditional Dosing therapeutic Range:         Trough 8-20 mg/L         Peak 20-50 mg/L       Tacrolimus levels    Invalid input(s): TACROLIMUS, TAC, TACR  Transplant Immunosuppression Labs Latest Ref Rng & Units 2/19/2017 2/18/2017 2/17/2017 2/16/2017 2/15/2017   Siro Level 5.0 - 15.0 ug/L - 4.8(L) - - -   Siro Level - - Not Provided - - -   Tacro Level 5.0 - 15.0 ug/L - 5.4 - - -   Tacro Level - - Not Provided - - -   Creat 0.52 - 1.04 mg/dL 0.91 1.02 1.05(H) 1.17(H) -   BUN 7 - 30 mg/dL 31(H) 36(H) 40(H) 38(H) -   WBC 4.0 - 11.0 10e9/L - - 6.1 7.0 5.0   Neutrophil % - - - 67.6 64.4   ANEU 1.6 - 8.3 10e9/L - - - 4.7 3.2         Microbiology:  Ucx  2/13/2017 polymicrobials with LFGNB X2-3 GPB    2/16/2017 E coli not ESBL   Blood cx 2/16/2017 negative to date     Last check of C difficile  C Diff Toxin B PCR   Date Value Ref Range Status   09/16/2016  NEG Final    Negative  Negative: Clostridium difficile target DNA sequences NOT detected, presumed   negative for Clostridium difficile toxin B or the number of bacteria present   may be below the limit of detection for the test.   FDA approved assay performed using [employer] GeneXpert real-time PCR.   A negative result does not exclude actual disease due to Clostridium difficile   and may be due to improper collection, handling and storage of the specimen or   the number of organisms in the specimen is below the detection limit of the   assay.         Virology:  CMV Quantitative   Date Value Ref Range Status   07/18/2012 <100  No CMV DNA detected. <100 Copies/mL Final       No  results found for: H6RES    EBV DNA Copies/mL   Date Value Ref Range Status   08/30/2016 2393 (A) EBVNEG [Copies]/mL Final   05/28/2016 9498 (A) EBVNEG [Copies]/mL Final       CMV Antibody IgG   Date Value Ref Range Status   09/01/2016 (H) 0.0 - 0.8 AI Final    >8.0  Positive   Antibody index (AI) values reflect qualitative changes in antibody   concentration that cannot be directly associated with clinical condition or   disease state.         Imaging:  CT kidney stone protocol 2/17/2017   IMPRESSION:  1. New mild to moderate renal transplant hydronephrosis, without  hydroureter. A punctate nonobstructive stone is seen is the inferior  pole calyx, without stone identified in the renal pelvis or ureter to  explain obstruction. The urinary bladder is mildly distended. Mild  perinephric free fluid not significantly changed from 2013, otherwise  no significant change in right lower quadrant renal transplant  appearance.  2. Patchy opacities in the left lung base suspicious for infection.  Similar opacities were present in 2013.  3. 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 Continued attention on follow-up suggested.    [facility] of [facility] 2/17/2017   IMPRESSION:  1. New hydronephrosis of the right lower quadrant transplant kidney.  2. Elevation of the resistive indices in the arcuate arteries, mildly  increased since prior.      Elaina Paez  Pager: [phone]  02/19/2017

## 2017-02-19 NOTE — PLAN OF CARE
Problem: Goal Outcome Summary  Goal: Goal Outcome Summary  Outcome: Improving  VSS. BP checks done on R forearm as high into axilla as possible, for most accurate results. No c/o pain or nausea. A/O x4. Johnson intact.  @1020, 1 unit given per ss. Pt ate small amount banana bread and peaches for breakfast, no lunch, encouraged to order and eat Dinner adelita. Protein for wound healing. Sacrum wound intact, cleansed and barrier applied. No incontinence this shift. Repo q2h. Fluids D/c'd. Home CPAP here for when pt sleeps. Continue to monitor and follow POC.   .

## 2017-02-19 NOTE — PLAN OF CARE
Problem: Goal Outcome Summary  Goal: Goal Outcome Summary  Outcome: Improving  VSS. BP checks done on R forearm for most accurate results. No c/o pain or nausea. A/O x4. Johnson intact.  @ 0200, 1 unit given. Sacrum wound intact, cleansed and barrier applied. No incontinence overnight. Repo q2h. L PIV infusing NS @ 75 mL/hr. Continue to monitor and follow POC.      Addendum: AM , 1 unit given. Pt refused last repositioning comfortable and sleeping well.

## 2017-02-19 NOTE — PLAN OF CARE
Problem: Goal Outcome Summary  Goal: Goal Outcome Summary     Afebrile, BP max 195/86, rechecked on R forearm; Maybe standardize and only check right forearm to try and get similar readings to compare. OVSS, no c/o pain or nausea. Pt much more coherent, A&O x4. Good UOP to Johnson. Last . Diet regular because pt tolerated pudding and gelatin, but did not eat this evening. Wound on sacrum cleansed w/ barrier applied. No incontinence. Repositioned q2h. Continue to monitor and w/ POC.

## 2017-02-19 NOTE — CONSULTS
CONSULTATION FOLLOWUP NOTE      Overnight stable.  No new complaints this afternoon.  Gem Jade appeared to be doing very well.  She is awake and conversant.  She tells me that she has been in bed for many years, raising the possibility for immobilization induced hypercalcemia.      REVIEW OF SYSTEMS:  Four points negative except noted above.      PHYSICAL EXAMINATION:   VITAL SIGNS:  Reviewed, afebrile 97.3, heart rate 66, blood pressure 124/44, satting 94% to 98 on room air.   GENERAL:  Pleasant, in no acute distress.   HEENT:  Normocephalic, atraumatic.   NECK:  Supple.  Moist mucous membranes.   CHEST:  Clear to auscultation bilaterally.   HEART:  Regular.   ABDOMEN:  Soft, nontender.   EXTREMITIES:  Without edema.   GENITOURINARY:  Johnson in place draining clear urine.      LABORATORY DATA:  Labs reviewed, sodium 136, potassium 3.4, bicarbonate 26, BUN 31, creatinine 0.9, calcium 10.2 and glucose 139.  White count 6.1, hemoglobin 11.6 and platelet 113.  Urine culture from the 16th showing E. coli 10,000-50,000 colony forming units and greater than 100,000 colonies of Candida.      MEDICATIONS:  Current medication list reviewed and includes the followin.  Ceftriaxone 1 gram daily.   2.  Insulin regimen.   3.  Labetalol.   4.  Levothyroxine.   5.  Pravastatin.   6.  Prednisone 2.5 mg daily.   7.  Rapamune 1.5 mg daily.   8.  Tacrolimus 2 mg twice a day.      ASSESSMENT AND PLAN:   1.  Mental status change, possibly in the setting of another urinary tract infection.   2.  Urinary tract infection in the setting of chronic immunosuppression and kidney transplantation with culture indicating Escherichia coli and Candida.  Johnson had been replaced.   3.  Chronic immunosuppression on triple regimen with steroids, Rapamune and tacrolimus.   4.  Immobility.   5.  Hypercalcemia related to immobility most likely.      DISCUSSION:  Overall Ms. Jade appears to be progressing well.  She is more awake and conversant.   Her allograft function is stable with creatinine near baseline.  Her current immunosuppression appear adequate, would like to get levels, combination level and Prograf and sirolimus should be in the range of 8 and certainly under 10 with Prograf reading in the 4 range and sirolimus in the 4 range as well.  In regards to her current urinary tract infection will advise repeating a UA since we replaced the Johnson to assist for Candida.  If Candida is present would consider a course of antifungal.  Will continue to monitor and follow along with you.  Thank you for the consultation.         MONSE BAILEY MD, MS           D: 2017 14:34   T: 2017 15:34   MT: [id number]      Name:     MARYJANE SHAVER   MRN:      [id number]        Account:       [id number]   :      1945           Consult Date:  2017      Document: [id number]

## 2017-02-20 ENCOUNTER — TELEPHONE (OUTPATIENT)
Dept: FAMILY MEDICINE | Facility: CLINIC | Age: 72
End: 2017-02-20

## 2017-02-20 VITALS
WEIGHT: 205.3 LBS | BODY MASS INDEX: 32.99 KG/M2 | SYSTOLIC BLOOD PRESSURE: 186 MMHG | TEMPERATURE: 97.9 F | HEIGHT: 66 IN | HEART RATE: 74 BPM | DIASTOLIC BLOOD PRESSURE: 54 MMHG | RESPIRATION RATE: 18 BRPM | OXYGEN SATURATION: 94 %

## 2017-02-20 LAB
BKV DNA # SPEC NAA+PROBE: NORMAL COPIES/ML
BKV DNA SPEC NAA+PROBE-LOG#: NORMAL LOG COPIES/ML
EBV DNA # SPEC NAA+PROBE: ABNORMAL {COPIES}/ML
EBV DNA SPEC NAA+PROBE-LOG#: 5.1 {LOG_COPIES}/ML
GLUCOSE BLDC GLUCOMTR-MCNC: 168 MG/DL (ref 70–99)
GLUCOSE BLDC GLUCOMTR-MCNC: 179 MG/DL (ref 70–99)
GLUCOSE BLDC GLUCOMTR-MCNC: 209 MG/DL (ref 70–99)
SPECIMEN SOURCE: NORMAL

## 2017-02-20 PROCEDURE — 00000146 ZZHCL STATISTIC GLUCOSE BY METER IP

## 2017-02-20 PROCEDURE — A9270 NON-COVERED ITEM OR SERVICE: HCPCS | Mod: GY | Performed by: STUDENT IN AN ORGANIZED HEALTH CARE EDUCATION/TRAINING PROGRAM

## 2017-02-20 PROCEDURE — 99238 HOSP IP/OBS DSCHRG MGMT 30/<: CPT | Mod: GC | Performed by: INTERNAL MEDICINE

## 2017-02-20 PROCEDURE — 25000131 ZZH RX MED GY IP 250 OP 636 PS 637: Mod: GY | Performed by: STUDENT IN AN ORGANIZED HEALTH CARE EDUCATION/TRAINING PROGRAM

## 2017-02-20 PROCEDURE — 25000132 ZZH RX MED GY IP 250 OP 250 PS 637: Mod: GY | Performed by: STUDENT IN AN ORGANIZED HEALTH CARE EDUCATION/TRAINING PROGRAM

## 2017-02-20 RX ORDER — CIPROFLOXACIN 250 MG/1
250 TABLET, FILM COATED ORAL EVERY 12 HOURS SCHEDULED
Status: CANCELLED | OUTPATIENT
Start: 2017-02-20

## 2017-02-20 RX ORDER — HEPARIN SODIUM,PORCINE 10 UNIT/ML
2-5 VIAL (ML) INTRAVENOUS
Status: DISCONTINUED | OUTPATIENT
Start: 2017-02-20 | End: 2017-02-20 | Stop reason: HOSPADM

## 2017-02-20 RX ORDER — CIPROFLOXACIN 500 MG/1
500 TABLET, FILM COATED ORAL 2 TIMES DAILY
Qty: 20 TABLET | Refills: 0 | Status: SHIPPED | OUTPATIENT
Start: 2017-02-20 | End: 2017-03-02

## 2017-02-20 RX ORDER — LIDOCAINE 40 MG/G
CREAM TOPICAL
Status: DISCONTINUED | OUTPATIENT
Start: 2017-02-20 | End: 2017-02-20 | Stop reason: HOSPADM

## 2017-02-20 RX ADMIN — LABETALOL HCL 200 MG: 200 TABLET, FILM COATED ORAL at 08:31

## 2017-02-20 RX ADMIN — LEVOTHYROXINE SODIUM 50 MCG: 50 TABLET ORAL at 08:31

## 2017-02-20 RX ADMIN — INSULIN ASPART 2 UNITS: 100 INJECTION, SOLUTION INTRAVENOUS; SUBCUTANEOUS at 08:35

## 2017-02-20 RX ADMIN — CLONIDINE HYDROCHLORIDE 0.1 MG: 0.1 TABLET ORAL at 08:31

## 2017-02-20 RX ADMIN — INSULIN GLARGINE 14 UNITS: 100 INJECTION, SOLUTION SUBCUTANEOUS at 08:34

## 2017-02-20 RX ADMIN — TACROLIMUS 2 MG: 1 CAPSULE ORAL at 08:30

## 2017-02-20 RX ADMIN — KETOCONAZOLE: 20 CREAM TOPICAL at 08:34

## 2017-02-20 RX ADMIN — INSULIN ASPART 1 UNITS: 100 INJECTION, SOLUTION INTRAVENOUS; SUBCUTANEOUS at 13:13

## 2017-02-20 RX ADMIN — PREDNISONE 2.5 MG: 2.5 TABLET ORAL at 08:31

## 2017-02-20 RX ADMIN — SIROLIMUS 1.5 MG: 1 TABLET, SUGAR COATED ORAL at 08:31

## 2017-02-20 RX ADMIN — PRAVASTATIN SODIUM 10 MG: 10 TABLET ORAL at 08:31

## 2017-02-20 ASSESSMENT — PAIN DESCRIPTION - DESCRIPTORS: DESCRIPTORS: ACHING

## 2017-02-20 NOTE — INTERIM SUMMARY
Revised      CONSULTATION FOLLOWUP NOTE         Overnight stable. No new complaints this afternoon. Gem Jade appeared to be doing very well. She is awake and conversant. She tells me that she has been in bed for many years, raising the possibility for immobilization induced hypercalcemia.         REVIEW OF SYSTEMS: Four points negative except noted above.         PHYSICAL EXAMINATION:    VITAL SIGNS: Reviewed, afebrile 97.3, heart rate 66, blood pressure 124/44, satting 94% to 98 on room air.    GENERAL: Pleasant, in no acute distress.    HEENT: Normocephalic, atraumatic.    NECK: Supple. Moist mucous membranes.    CHEST: Clear to auscultation bilaterally.    HEART: Regular.    ABDOMEN: Soft, nontender.    EXTREMITIES: Without edema.    GENITOURINARY: Johnson in place draining clear urine.         LABORATORY DATA: Labs reviewed, sodium 136, potassium 3.4, bicarbonate 26, BUN 31, creatinine 0.9, calcium 10.2 and glucose 139. White count 6.1, hemoglobin 11.6 and platelet 113. Urine culture from the 16th showing E. coli 10,000-50,000 colony forming units and greater than 100,000 colonies of Candida.         MEDICATIONS: Current medication list reviewed and includes the followin. Ceftriaxone 1 gram daily.    2. Insulin regimen.    3. Labetalol.    4. Levothyroxine.    5. Pravastatin.    6. Prednisone 2.5 mg daily.    7. Rapamune 1.5 mg daily.    8. Tacrolimus 2 mg twice a day.         ASSESSMENT AND PLAN:    1. Mental status change, possibly in the setting of another urinary tract infection.    2. Urinary tract infection in the setting of chronic immunosuppression and kidney transplantation with culture indicating Escherichia coli and Candida. Johnson had been replaced.    3. Chronic immunosuppression on triple regimen with steroids, Rapamune and tacrolimus.    4. Immobility.    5. Hypercalcemia related to immobility most likely.         DISCUSSION: Overall Ms. Jade appears to be progressing well. She is more  awake and conversant. Her allograft function is stable with creatinine near baseline. Her current immunosuppression appear adequate, would like to get levels, combination level and Prograf and sirolimus should be in the range of 8 and certainly under 10 with Prograf reading in the 4 range and sirolimus in the 4 range as well. In regards to her current urinary tract infection will advise repeating a UA since we replaced the Johnson to assist for Candida. If Candida is present would consider a course of antifungal. Will continue to monitor and follow along with you. Thank you for the consultation.      Revised work type lg 17          MONSE BAILEY MD             D: 2017 14:34   T: 2017 15:34   MT: [id number]      Name:     MARYJANE SHAVER   MRN:      [id number]        Account:        [id number]   :      1945           Admit Date:                                       Discharge Date:       Document: [id number]

## 2017-02-20 NOTE — PLAN OF CARE
Problem: Confusion, Acute (Adult)  Goal: Identify Related Risk Factors and Signs and Symptoms  Related risk factors and signs and symptoms are identified upon initiation of Human Response Clinical Practice Guideline (CPG)   Outcome: No Change  Pt was confused to place and situation this am but oriented X4 this afternoon.  Afe with IV AB.  Johnson intact and cares were done.  Loose stool X2 with brandie care per POC  Turned every 2 hours.  Remains hypertensive today.  Not eating much, needs lots of encouragement to eat.   called and was upset saying he felt like there was a lack of communication with Shantelle's plan of care.  Called Dr Dennise Barrientos and she will call  to update him on situation.  At this point plan to get a PICC for home IV AB.

## 2017-02-20 NOTE — PROGRESS NOTES
"CONSULTATION FOLLOWUP NOTE         Overnight stable. No new complaints this afternoon. Gem Jade appeared to be doing very well. She is awake and conversant. She tells me that she has been in bed for many years, raising the possibility for immobilization induced hypercalcemia.         REVIEW OF SYSTEMS: Four points negative except noted above.         PHYSICAL EXAMINATION:    VITAL SIGNS: Blood pressure 189/63, pulse 74, temperature 98  F (36.7  C), temperature source Oral, resp. rate 18, height 1.676 m (5' 6\"), weight 93.1 kg (205 lb 4.8 oz), SpO2 96 %, not currently breastfeeding.    GENERAL: Pleasant, in no acute distress.    HEENT: Normocephalic, atraumatic.    NECK: Supple. Moist mucous membranes.    CHEST: Clear to auscultation bilaterally.    HEART: Regular.    ABDOMEN: Soft, nontender.    EXTREMITIES: Without edema.    GENITOURINARY: Johnson in place draining clear urine.         LABORATORY DATA: Labs reviewed  Last Basic Metabolic Panel:  Lab Results   Component Value Date     02/19/2017      Lab Results   Component Value Date    POTASSIUM 3.4 02/19/2017     Lab Results   Component Value Date    CHLORIDE 101 02/19/2017     Lab Results   Component Value Date    ROGER 10.2 02/19/2017     Lab Results   Component Value Date    CO2 26 02/19/2017     Lab Results   Component Value Date    BUN 31 02/19/2017     Lab Results   Component Value Date    CR 0.91 02/19/2017     Lab Results   Component Value Date     02/19/2017              MEDICATIONS:  Current Facility-Administered Medications   Medication     lidocaine 1 % 0.5-5 mL     lidocaine (LMX4) kit     sodium chloride (PF) 0.9% PF flush 5-50 mL     heparin lock flush 10 UNIT/ML injection 2-5 mL     cefTRIAXone (ROCEPHIN) 1 g vial to attach to  mL bag for ADULTS or NS 50 mL bag for PEDS     menthol (Topical Analgesic) 2.5% (BENGAY VANISHIN SCENT) 2.5 % topical gel     simethicone (MYLICON) chewable tablet 80 mg     insulin glargine (LANTUS) " injection 14 Units     pravastatin (PRAVACHOL) tablet 10 mg     predniSONE (DELTASONE) tablet 2.5 mg     tacrolimus (PROGRAF - GENERIC EQUIVALENT) capsule 2 mg     cloNIDine (CATAPRES) tablet 0.1 mg     naloxone (NARCAN) injection 0.1-0.4 mg     ondansetron (ZOFRAN-ODT) ODT tab 4 mg    Or     ondansetron (ZOFRAN) injection 4 mg     sirolimus (RAPAMUNE BRAND) tablet 1.5 mg     labetalol (NORMODYNE) tablet 200 mg     levothyroxine (SYNTHROID/LEVOTHROID) tablet 50 mcg     glucose 40 % gel 15-30 g    Or     dextrose 50 % injection 25-50 mL    Or     glucagon injection 1 mg     insulin aspart (NovoLOG) inj (RAPID ACTING)     insulin aspart (NovoLOG) inj (RAPID ACTING)     ketoconazole (NIZORAL) 2 % cream       ASSESSMENT AND PLAN:    1. Mental status change, possibly in the setting of another urinary tract infection.    2. Urinary tract infection in the setting of chronic immunosuppression and kidney transplantation with culture indicating Escherichia coli and Candida. Johnson had been replaced.    3. Chronic immunosuppression on triple regimen with steroids, Rapamune and tacrolimus.    4. Immobility.    5. Hypercalcemia related to immobility most likely.         DISCUSSION: Overall Ms. Jade appears to be progressing well. She is more awake and conversant. Her allograft function is stable with creatinine near baseline. Her current immunosuppression appear adequate, would like to get combination level Prograf and sirolimus should be in the range of 8 and certainly under 10 with Prograf reading in the 4 range and sirolimus in the 4 range as well.   In regards to her current urinary tract infection, will advise repeating a UA since we replaced the Johnson to assessfor Candida. If Candida is present would consider a course of antifungal. Will continue to monitor and follow along with you. Thank you for the consultation.      MONSE BAILEY MD

## 2017-02-20 NOTE — PLAN OF CARE
Problem: Goal Outcome Summary  Goal: Goal Outcome Summary     VSS. BP checks done on R forearm as high into axillary as possible, for most accurate results. A&O x4. Good UOP for Johnson.  & 151. Pt ate some of her BLT sandwich. Menthol cream applied to aching left shoulder. Sacrum wound intact, barrier applied. No incontinence this shift. Repo q2h. Saline locked. Pt sleeping using CPAP. Continue w/ POC.

## 2017-02-20 NOTE — DISCHARGE SUMMARY
[facility] Discharge Summary    Gem Jade MRN# [id number]   Age: 71 year old YOB: 1945     Date of Admission:  2/16/2017  Date of Discharge::  2/20/2017  Admitting Physician:  Santosh Chen MD  Discharge Physician:  Radha Boyce MD             Admission Diagnoses:   Hypercalcemia [E83.52]  Hyponatremia [E87.1]  Complicated UTI (urinary tract infection) [N39.0]  S/p kidney transplant  Bedbound status          Discharge Diagnosis:      Hypercalcemia [E83.52]  Hyponatremia [E87.1]  Complicated UTI (urinary tract infection) [N39.0]  S/p kidney transplant  Bedbound status         Procedures:     Renal US: 2/17  IMPRESSION:  1. New hydronephrosis of the right lower quadrant transplant kidney.  2. Elevation of the resistive indices in the arcuate arteries, mildly  increased since prior.    CT abd/pelvis: 2/17  IMPRESSION:  1. New mild to moderate renal transplant hydronephrosis, without  hydroureter. A punctate nonobstructive stone is seen is the inferior  pole calyx, without stone identified in the renal pelvis or ureter to  explain obstruction. The urinary bladder is mildly distended. Mild  perinephric free fluid not significantly changed from 2013, otherwise  no significant change in right lower quadrant renal transplant  appearance.  2. Patchy opacities in the left lung base suspicious for infection.  Similar opacities were present in 2013.  3. 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 Continued attention on follow-up suggested.          Labs:          Lab Results   Component Value Date     02/19/2017    Lab Results   Component Value Date    CHLORIDE 101 02/19/2017    Lab Results   Component Value Date    BUN 31 02/19/2017      Lab Results   Component Value Date    POTASSIUM 3.4 02/19/2017    Lab Results   Component Value Date    CO2 26 02/19/2017    Lab Results   Component Value Date    CR 0.91 02/19/2017        Lab Results    Component Value Date    WBC 6.1 02/17/2017    HGB 11.6 (L) 02/17/2017    HCT 36.2 02/17/2017    MCV 90 02/17/2017     (L) 02/17/2017     Culture Micro (Abnormal) 02/16/2017  8:36 PM 75      10,000 to 50,000 colonies/mL Escherichia coli   >100,000 colonies/mL Candida albicans / dubliniensis Candida albicans and Candida    dubliniensis are not routinely speciated      10,000 TO 50,000 COLONIES/ML ESCHERICHIA COLI (SHEELA)      Antibiotic Sensitivity Unit Status     AMPICILLIN 8 Susceptible ug/mL Final     AMPICILLIN/SULBACTAM 8 Susceptible ug/mL Final     CEFAZOLIN <=4 Susceptible  Cefazolin SHEELA breakpoints are for the treatment of uncomplicated urinary tract   infections.  For the treatment of systemic infections, please contact the   laboratory for additional testing. ug/mL Final     CEFEPIME <=1 Susceptible ug/mL Final     CEFOXITIN 8 Susceptible ug/mL Final     CEFTAZIDIME <=1 Susceptible ug/mL Final     CEFTRIAXONE <=1 Susceptible ug/mL Final     CIPROFLOXACIN <=0.25 Susceptible ug/mL Final     GENTAMICIN <=1 Susceptible ug/mL Final     LEVOFLOXACIN <=0.12 Susceptible ug/mL Final     NITROFURANTOIN <=16 Susceptible ug/mL Final     Piperacillin/Tazo <=4 Susceptible ug/mL Final     TOBRAMYCIN <=1 Susceptible ug/mL Final                 Discharge Medications:     Current Discharge Medication List      START taking these medications    Details   ciprofloxacin (CIPRO) 500 MG tablet Take 1 tablet (500 mg) by mouth 2 times daily for 10 days  Qty: 20 tablet, Refills: 0    Associated Diagnoses: Complicated UTI (urinary tract infection)         CONTINUE these medications which have NOT CHANGED    Details   TraMADol HCl 50 MG TBDP Take 50 mg by mouth 3 times daily as needed  Qty: 90 tablet, Refills: 3    Associated Diagnoses: Chronic shoulder pain, unspecified laterality      blood glucose monitoring (ACCU-CHEK COMPACT PLUS) test strip Use to test blood sugar 3 times daily or as directed.  Qty: 100 strip, Refills: 11     Associated Diagnoses: Type 2 diabetes mellitus with diabetic polyneuropathy, with long-term current use of insulin (H)      blood glucose monitoring (ACCU-CHEK COMPACT CARE KIT) meter device kit Use to test blood sugar 3 times daily or as directed.  Qty: 1 kit, Refills: 0    Associated Diagnoses: Type 2 diabetes mellitus with diabetic polyneuropathy, with long-term current use of insulin (H)      insulin aspart (NOVOLOG FLEXPEN) 100 UNIT/ML injection Inject 7 units before meals BID. At bedtime use following sliding scale:   150-200 5u  201-250 7u  251-300 9u  301-350 11u  351+ 13u  Qty: 15 mL, Refills: 0    Associated Diagnoses: Diabetes mellitus with background retinopathy (H)      OXcarbazepine (TRILEPTAL) 300 MG tablet Take 1 tablet (300 mg) by mouth 2 times daily  Qty: 180 tablet, Refills: 3    Associated Diagnoses: Diabetic polyneuropathy associated with diabetes mellitus due to underlying condition (H)      levothyroxine (SYNTHROID) 50 MCG tablet Take 1 tablet (50 mcg) by mouth daily  Qty: 90 tablet, Refills: 3    Associated Diagnoses: Other specified hypothyroidism      cloNIDine (CATAPRES) 0.1 MG tablet Take 1 tablet (0.1 mg) by mouth 2 times daily  Qty: 180 tablet, Refills: 3    Associated Diagnoses: HTN, goal below 140/90      gabapentin (NEURONTIN) 300 MG capsule Take 3 tabs 2x daily  Qty: 540 capsule, Refills: 3    Associated Diagnoses: Diabetic polyneuropathy associated with diabetes mellitus due to underlying condition (H)      RAPAMUNE 1 MG PO TABLET Take 1 tablet (1 mg) by mouth daily GENERIC (total dose 1.5 mg daily)  Qty: 30 tablet, Refills: 11    Comments: Dose change.  Associated Diagnoses: Kidney transplant recipient      tacrolimus (PROGRAF - GENERIC EQUIVALENT) 0.5 MG capsule HOLD  Qty: 60 capsule, Refills: 6    Comments: Dose change.  Associated Diagnoses: Kidney transplant recipient      tacrolimus (PROGRAF - GENERIC EQUIVALENT) 1 MG capsule Take 2 capsules (2 mg) by mouth 2 times  "daily  Qty: 120 capsule, Refills: 6    Comments: Dose change.  Associated Diagnoses: Kidney transplant recipient      RAPAMUNE 0.5 MG PO TABLET Take 1 tablet (0.5 mg) by mouth daily GENERIC (1.5 mg daily)  Qty: 30 tablet, Refills: 3    Comments: Needs 0.5 mg tablets for dose change.  Associated Diagnoses: Kidney transplant recipient      insulin glargine (LANTUS SOLOSTAR) 100 UNIT/ML PEN Inject 14 units under the skin daily.  Qty: 15 mL, Refills: 3    Comments: Dose change, hold Rx on file  Associated Diagnoses: Diabetes mellitus with background retinopathy (H)      labetalol (NORMODYNE) 200 MG tablet Take 1 tablet (200 mg) by mouth 2 times daily  Qty: 180 tablet, Refills: 3    Comments: Replaces previous Rx sent for 100mg tabs  Associated Diagnoses: Kidney replaced by transplant; Benign essential hypertension      cyanocobalamin (VITAMIN B12) 1000 MCG/ML injection Inject 1 mL (1,000 mcg) into the muscle every 30 days  Qty: 3 mL, Refills: 3    Associated Diagnoses: Iron deficiency anemia, unspecified iron deficiency anemia type      syringe/needle, disp, (B-D SYRINGE/NEEDLE) 22G X 1-1/2\" 3 ML MISC Inject 1 Syringe into the muscle every 30 days  Qty: 50 each, Refills: 0    Associated Diagnoses: Iron deficiency anemia, unspecified iron deficiency anemia type      !! order for DME Equipment being ordered: Sourcebits FX CPAP interface (nasal pillows), size XS.  Qty: 1 each, Refills: 1    Associated Diagnoses: Obstructive sleep apnea syndrome      glucagon (GLUCAGON EMERGENCY) 1 MG injection Inject 1 mg into the muscle once  Qty: 1 each, Refills: 11    Associated Diagnoses: Hypoglycemia      clotrimazole (LOTRIMIN) 1 % cream Apply topically 2 times daily Apply to buttocks twice a day until rash resolved.  Qty: 40 g, Refills: 0      loratadine (CLARITIN) 10 MG tablet Take 1 tablet (10 mg) by mouth daily  Qty: 90 tablet, Refills: 3    Associated Diagnoses: Acute nasopharyngitis      insulin pen needle 30G X 8 MM Use as directed " with Lantus  Qty: 100 each, Refills: 3    Associated Diagnoses: Type 2 diabetes mellitus with diabetic nephropathy (H)      pravastatin (PRAVACHOL) 10 MG tablet Take 1 tablet (10 mg) by mouth daily  Qty: 90 tablet, Refills: 3    Associated Diagnoses: Hyperlipidemia LDL goal <100      ondansetron (ZOFRAN ODT) 4 MG disintegrating tablet Take 1-2 tablets (4-8 mg) by mouth every 8 hours as needed for nausea  Qty: 20 tablet, Refills: 5    Associated Diagnoses: Nausea      simethicone (MYLICON) 125 MG CHEW Take 1 tablet (125 mg) by mouth as needed for intestinal gas  Qty: 120 tablet      loperamide (IMODIUM) 2 MG capsule Take 1 capsule (2 mg) by mouth 3 times daily as needed for diarrhea  Qty: 60 capsule, Refills: 0    Associated Diagnoses: Gastroenteritis      Lactobacillus Rhamnosus, GG, (CULTURELL) capsule Take 1 capsule by mouth 2 times daily  Qty: 60 capsule, Refills: 11    Associated Diagnoses: Diarrhea      predniSONE (DELTASONE) 2.5 MG tablet Take 1 tablet (2.5 mg) by mouth daily  Qty: 30 tablet, Refills: 11    Associated Diagnoses: Kidney replaced by transplant      Nystatin (NYSTOP) 750030 UNIT/GM POWD Externally apply 1 g topically 3 times daily as needed  Qty: 60 g, Refills: 6    Associated Diagnoses: Fungal infection      !! order for DME Equipment being ordered: Blue Chux  Please send to [facility] medical  Qty: 100 pad, Refills: 3    Associated Diagnoses: Fecal incontinence      alcohol swab prep pads Use to swab area of injection/yvon and vials as directed.  Qty: 1 Box, Refills: 12    Associated Diagnoses: Type 2 diabetes, HbA1C goal < 8% (H)      Cranberry 400 MG TABS Take 400 mg by mouth 2 times daily      benzonatate (TESSALON) 100 MG capsule Take 1 capsule (100 mg) by mouth 3 times daily as needed for cough  Qty: 50 capsule, Refills: 0    Associated Diagnoses: Cough      phenazopyridine (PYRIDIUM) 100 MG tablet Take 1 tablet (100 mg) by mouth 3 times daily as needed for irritation  Qty: 6 tablet, Refills:  3    Comments: Already sent to pharmacy  Associated Diagnoses: Recurrent UTI; Rico catheter status      famotidine (PEPCID) 20 MG tablet Take 1 tablet (20 mg) by mouth 2 times daily  Qty: 60 tablet, Refills: 1      !! ORDER FOR DME Equipment being ordered: Rico Catheters - 18 french  Qty: 3 catheter, Refills: PRN    Associated Diagnoses: Rico catheter status      ACE NOT PRESCRIBED, INTENTIONAL, 1 each continuous prn ACE Inhibitor not prescribed due to Worsening renal function on ACE/ARB therapy  Refills: 0    Associated Diagnoses: Type 2 diabetes, HbA1C goal < 8% (H)      ASPIRIN PO Take 81 mg by mouth daily.        ACETAMINOPHEN PO Take 325-650 mg by mouth every 4 hours as needed.       !! - Potential duplicate medications found. Please discuss with provider.      STOP taking these medications       cephALEXin (KEFLEX) 500 MG capsule Comments:   Reason for Stopping:         calcium-vitamin D (CALTRATE) 600-400 MG-UNIT per tablet Comments:   Reason for Stopping:         Cholecalciferol (VITAMIN D) 2000 UNITS tablet Comments:   Reason for Stopping:                     Consultations:   Consultation during this admission received from infectious disease and transplant nephrology          Brief History of Illness:     72 yo F with kidney transplant on sirolimus/tacrolimus/prednisone and chronic indwelling rico with recurrent UTIs admitted with altered mental status, new transplant hydronephrosis, and likely pyelonephritis.          Hospital Course:       # Encephalopathy: - resolved. presented due to concern for AMS. Found to have probable complicated UTI. Also with hypercalcemia as well. Also has known dysregulation of sleep/wake cycle. Please see management as noted below.      # Complicated UTI / pyelonephritis  # Transplant hydronephrosis:  Patient with hx of infection with ESBL E. Coli and multiple infections with Klebsiella and well as candida, concerning for both multidrug resistent Klebsiella (as pt has had  multiple antibiotic exposures) and ESBL. Was on keflex outpatient, started on cefepime in ED. Patient has new hydronephrosis in transplanted kidney per US. Was switched to ertapenem given history. Rico has been exchanged. CT with non-obstructing stone, mild to moderate hydro. Discussed with nephrology, no plan for PNT. Urine culture revealed E coli. Sensitivities as noted above. Changed from ertapenem to ceftriaxone per ID. Also sensitive to cipro. ID felt candida was genital/perineum contamination. Will discharge to home to complete 14 day course. Discussed this with her . Has tolerated cipro in the past as recently as August.   - Ertapenem IV (2/17 -> 2/19)  - Change to Cipro 500 mg BID (end date 3/2)  --Continue prednisone/tac/sirolimus  --Tac 5.4, sirolimus 4.8     #Hypercalcemia: with low PTH. Concerning for vit d toxicity. Ca downtrending with fluids. Mentation somewhat improved.   --check 1,25 Vit D, and 25 vit D, PTH like peptide - pending  --stop home calcium and vitamin D  --Follow up with PCP     # Normocytic Anemia: hgb 11.   Ferritin high, iron binding capacity low. C/w ACD  Folate normal. B12 high.             Condition at Discharge:     Temp: 97.9  F (36.6  C) Temp src: Oral BP: 186/54 Pulse: 74 Heart Rate: 81 Resp: 18 SpO2: 94 % O2 Device: None (Room air)       Gen: alert, elderly appearing, resting in bed, no distress  HEENT: very dry cracked lips  Resp: breathing comfortably on room air  CV: RRR  Abd: soft, non-tender  : rico with clear/yellow output  Extrem: no LE edema  Neuro: alert and oriented x4, speech is normal and fluent          Discharge Instructions and Follow-Up:   Discharge diet: Regular   Discharge activity: Activity as tolerated   Discharge follow-up: Follow up as noted below          Discharge Procedure Orders  Home care nursing referral   Referral Type: Home Health Therapies & Aides     Reason for your hospital stay   Order Comments: You were hospitalized for altered  mental status. Found to have urinary tract infection. You were seen by infectious disease and transplant nephrology. Your rico was exchanged. No need for a percutaneous nephrostomy tube at this time point. Will have you continue oral antibiotics at home for an additional ten days.    You were also found to have high calcium. Concern that you may be on too much vitamin D. Will have you stop taking your vitamin D and calcium for now until you follow up with your primary care provider.     Adult [facility] Follow-up and recommended labs and tests   Order Comments: Follow up with primary care provider, Marivel Barcenas NP, within 7 days for hospital follow- up.  No follow up labs or test are needed.      Appointments on [place] and/or [facility] (with [facility] or [facility] provider or service). Call [phone] if you haven't heard regarding these appointments within 7 days of discharge.     When to contact your care team   Order Comments: Should you have fevers or chills, confusion, nausea, vomiting, or diarrhea.     Discharge Instructions   Order Comments: You will need to continue antibiotics until March 2nd.   Follow up with your primary care provider in one week.   Please stop taking you calcium and vitamin D until you have further instructions from your PCP     Tubes and drains   Order Comments: You are going home with the following tubes or drains: rico catheter.  Tube cares per hospital or home care instructions     Full Code     Diet   Order Comments: Follow this diet upon discharge: Orders Placed This Encounter     Advance Diet as Tolerated: Regular Diet Adult   Order Specific Question Answer Comments   Is discharge order? Yes               Discharge Disposition:   Discharged to home      Patient was seen and discussed with Dr. Radha Boyce    Please feel free to contact me with any questions    Dennise Barrientos MD  PGY-3 Internal Medicine  [phone]

## 2017-02-20 NOTE — PLAN OF CARE
"Problem: Goal Outcome Summary  Goal: Goal Outcome Summary  Outcome: No Change  /40 (BP Location: Left leg)  Pulse 66  Temp 96.9  F (36.1  C) (Oral)  Resp 18  Ht 1.676 m (5' 6\")  Wt 92.8 kg (204 lb 8 oz)  SpO2 100%  BMI 33.01 kg/m2   c-pap on, VSS. A&Ox4. Denies pain and  R forearm as high into axilla as possible, for most accurate results. No c/o pain or nausea. A/O x4. Johnson intact. BG stable overnight. Sacrum wound intact, cdi. No incontinence this shift. Repo q2h. Fluids D/c'd. Home CPAP here for when pt sleeps. Continue to monitor and follow POC.      "

## 2017-02-20 NOTE — PROGRESS NOTES
Care Coordinator Progress Note     Admission Date/Time:  2/16/2017  Attending MD:  Radha Boyce MD     Data  Chart reviewed, discussed with interdisciplinary team.   Patient was admitted for:    Complicated UTI (urinary tract infection)  Hyponatremia  Hypercalcemia  Urinary tract infection without hematuria, site unspecified  Transplanted kidney  Diabetes mellitus (H)  Kidney replaced by transplant.    Concerns with insurance coverage for discharge needs: None.  Current Living Situation: Patient lives with spouse.  Support System: Supportive and Involved  Services Involved: DME and Home Care  Transportation: Agency transportation  Barriers to Discharge: no barriers indentified at this time     Assessment  Pt is a 71 year old female with PMH significant for kidney transplant and chronic indwelling rico with recurrent UTIs who was admitted with altered mental status, new transplant hydronephrosis, and likely pyelonephritis. Pt now alert and oriented x 4. Pt is bed bound at baseline and, prior to admission, was receiving RN and HHA with [facility]. Met with pt at bedside to introduce RNCC role and discuss discharge planning. Pt stated that she would like home care services resumed on discharge, resumption orders placed. Pt's spouse is available 24/7 and pt has 2 christiana lifts and chair lifts at home. Pt also [facility]. When pt is medically stable for discharge, stretcher transport will need to be arranged through [facility]. Will continue to follow plan of care and assist with discharge planning as needed.      Addendum @ 3:30- Pt will discharge to home today. Stretcher transport arranged for 5:30 pm with [facility]. PCS form given to 7D NST. Updated pt and pt's spouse, Chang. [facility] also notified of pt's discharge.     Plan  Anticipated Discharge Date:  2/20-2/21  Anticipated Discharge Plan:  Pt will discharge to home with resumption of home  services.     Myrna Biswas RN

## 2017-02-20 NOTE — PROGRESS NOTES
Erroneously I calculated 14 days of ABx course to end on 2/23/2017, it should actually end on 3/2/2017.   Elaina Paez MD

## 2017-02-21 ENCOUNTER — CARE COORDINATION (OUTPATIENT)
Dept: CARDIOLOGY | Facility: CLINIC | Age: 72
End: 2017-02-21

## 2017-02-21 DIAGNOSIS — Z79.60 LONG-TERM USE OF IMMUNOSUPPRESSANT MEDICATION: ICD-10-CM

## 2017-02-21 DIAGNOSIS — Z94.0 KIDNEY TRANSPLANT RECIPIENT: ICD-10-CM

## 2017-02-21 DIAGNOSIS — D84.9 IMMUNOSUPPRESSED STATUS (H): ICD-10-CM

## 2017-02-21 DIAGNOSIS — N39.0 RECURRENT URINARY TRACT INFECTION: Primary | ICD-10-CM

## 2017-02-21 LAB — PTH RELATED PROT SERPL-SCNC: 2.7 PMOL/L

## 2017-02-21 NOTE — PROGRESS NOTES
Discharge  D: Orders for discharge and outpatient medications written.  I: Home medications and return to clinic schedule reviewed with patient. Discharge instructions and parameters for calling Health Care Provider reviewed. Patient left at 1759 accompanied by ambulance personnel. PIV removed; incontinence care done.  A: Patient/family verbalized understanding and was ready for discharge.   P: Patient given CIPRO picked up for her from discharge Pharmacy. Follow up as scheduled.

## 2017-02-21 NOTE — PROGRESS NOTES
"[facility]  \"Hello, my name is Cristina Herrera , and I am calling from the [facility].  I want to check in and see how you are doing, after leaving the hospital.  You may also receive a call from your Care Coordinator (care team), but I want to make sure you don t have any urgent needs.  I have a couple questions to review with you:     Post-Discharge Outreach                                                    Gem Jade is a 71 year old female     Follow-up Appointments           Adult [facility] Follow-up and recommended labs and tests       Follow up with primary care provider, Marivel Barcenas, ALISON, within 7 days for hospital follow- up. No follow up labs or test are needed.      Appointments on [facility] and/or [facility] (with [facility] or [facility] provider or service). Call [phone] if you haven't heard regarding these appointments within 7 days of discharge.                       Your next 10 appointments already scheduled            Mar 01, 2017 2:00 PM CST   Return Visit with ANTHONY Antonio CNP   [facility] ([facility])     [address] [place] [id number] [phone]                  Mar 21, 2017 12:15 PM CDT   Office Visit with Conchita Toledo [facility] ([facility])     [address] [place] [id number] [phone]            Care Team:    Patient Care Team       Relationship Specialty Notifications Start End    Marivel Barcenas APRN CNP PCP - General Family Practice  5/24/13     Phone: [phone] Fax: [phone]         [facility] [address] [place] [id number]    Aditi Flores, RN Registered Nurse  Admissions 3/18/14     Monico Heck MD MD Transplant  10/6/15     Comment:  nephrology - [phone]    Phone: [phone] Fax: [phone] "          [facility] [address] [facility] [address]    [facility] Care Coordinator  Admissions 2/17/16     Phone: [phone] Fax: [phone]                Transition of Care Review                                                      Did you have a surgery or procedure during your hospital visit? Yes   If yes, do you have any of the following:     Signs of infection:  No:      Pain:  No     Pain Scale -NO     Location:     Wound/incision concerns? Yes- catheter is leaking    Do you have all of your medications/refills?  Yes    Are you having any side effects or questions about your medication(s)? No    Do you have any new or worsening symptoms?  No    Do you have any future appointments scheduled?   yes       Next 5 appointments (look out 90 days)     Mar 01, 2017  2:00 PM CST   Return Visit with ANTHONY Antonio CNP   [facility] ([facility])    [address] [place] [phone]            Mar 21, 2017 12:15 PM CDT   Office Visit with Conchita Toledo [facility] ([facility])    [address] [place] [address] [address] [place] [phone]                  ----->Patient  is worried, because they have come to change her catheter and it is leaking again. Would like for someone to call him ASAP<----    Plan                                                      Thanks for your time.  Your Care Coordinator may follow-up within the next couple days.  In the meantime if you have questions, concerns or problems call your care team.        Cristina Herrera

## 2017-02-21 NOTE — CONSULTS
HISTORY OF PRESENT ILLNESS:  Ms. Gem Jade is a 71-year-old lady with a history of type 2 diabetes with nephropathy who is status post living donor kidney transplant in 1999 with baseline creatinine of 0.8 to 1 mg per deciliter on the 17th of this month with EMS and possible UTI.  Her Johnson was changed per the hospitalist team.  This afternoon she appeared comfortable without complaint; had her BiPap mask on.       REVIEW OF SYSTEMS:  Unable to obtain in full due to BiPap and patient's ability to give history.      PHYSICAL EXAMINATION:   VITAL SIGNS:  Reviewed.  She is afebrile 98, heart rate 73, blood pressure 140s/50s, satting 99% to 100% on BiPap CPAP.   GENERAL:  No acute distress.   HEENT:  Normocephalic, atraumatic.   CHEST:  Clear, air entry unchanged, decreased over the bases.   HEART:  Regular.   ABDOMEN:  Soft, obese.   EXTREMITIES:  Without significant edema.      LABORATORY DATA:  Reviewed.  Sodium 135, potassium 3.7, chloride 100, BUN 36, creatinine 1.0, bicarb 28, calcium 10.3, glucose in the 180s, Sirolimus 4.8, tacrolimus 5.4.      IMAGING:  CT scan results reviewed.        MEDICATION LIST:   1.  Clonidine 3 times a day.   2.  Ertapenem 1 gram daily.   3.  Hydralazine as needed.   4.  Insulin regimen.   5.  Nizoral cream.     6.  Labetalol 200 mg twice a day.   7.  Levothyroxine.   8.  Pravastatin.   9.  Prednisone 2.5 mg daily.   10.  Sirolimus 1.5 mg daily.   11. Prograf 2 mg twice a day.      ASSESSMENT AND PLAN:   1.  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   2.  Kidney suppression management.   3.  Possible UTI versus contamination or colonization due to Johnson dependence now with E. coli and Candida.   4.  Hypercalcemia possibly related to prolonged immobility plus or minus volume depletion.   5.  Anemia of chronic illness.      DISCUSSION:  Overall, Ms. Gem Jade is a 71-year-old lady  with a kidney transplant on chronic immunosuppression that appears to be tolerating the immunosuppression fairly well.  She has another presentation with mental status change that appears to be improving with hydration and antibiotic coverage.  She is status post Johnson replacement.  I was approached by the hospitalist team today regarding the need for nephrostomy tube.  At this point, it does not appear to be needed as the patient's urine output is intact and Johnson appears to be draining with urine output in the last 24 hours greater than 1.75 liters.  I do, however, recommend retrieving the urine culture with the new Johnson to assess for Candida as if Candida is present again despite the Johnson replacement I will be in favor of covering for Candida, if in agreement with ID.  In regard to the hypercalcemia, immobility appears to be on the differential.  Workup was sent.  Will monitor for results.  For now, we will continue with the hydration.  Calcium has improved from 11.2 to 10.3.  We will continue to monitor and follow along with you.  Thank you for the consultation.        This patient was seen and examined and discussed with the hospitalist team director.         MONSE BAILEY MD             D: 2017 07:59   T: 2017 08:33   MT: [id number]      Name:     MARYJANE SHAVER   MRN:      [id number]        Account:       [id number]   :      1945           Consult Date:  2017      Document: [id number]

## 2017-02-22 ENCOUNTER — DOCUMENTATION ONLY (OUTPATIENT)
Dept: CARE COORDINATION | Facility: CLINIC | Age: 72
End: 2017-02-22

## 2017-02-22 LAB
BACTERIA SPEC CULT: NO GROWTH
DEPRECATED CALCIDIOL+CALCIFEROL SERPL-MC: NORMAL UG/L (ref 20–75)
DEPRECATED CALCIDIOL+CALCIFEROL SERPL-MC: NORMAL UG/L (ref 20–75)
MICRO REPORT STATUS: NORMAL
MICRO REPORT STATUS: NORMAL
SPECIMEN SOURCE: NORMAL
SPECIMEN SOURCE: NORMAL
VITAMIN D2 SERPL-MCNC: <5 UG/L
VITAMIN D2 SERPL-MCNC: <5 UG/L
VITAMIN D3 SERPL-MCNC: 48 UG/L
VITAMIN D3 SERPL-MCNC: 51 UG/L
YEAST SPEC QL CULT: NORMAL

## 2017-02-22 NOTE — PROGRESS NOTES
[facility] now requests orders and shares plan of care/discharge summaries for some patients through [employer].  Please REPLY TO THIS MESSAGE in order to give authorization for orders when needed.  This is considered a verbal order, you will still receive a faxed copy of orders for signature.  Thank you for your assistance in improving collaboration for our patients.    ORDER    Resumption of care orders  Skilled nurse 2week1, 3week2, 2week5, 1week1 plus 3 PRN visits  HA service 1week1, 2week7, 1week1 for bathing assistance

## 2017-02-22 NOTE — PROGRESS NOTES
Spoke with SEKOU Layne FV and gave verbal ok on  Order requested per RN protocol.    Wendie Harris RN

## 2017-03-01 ENCOUNTER — OFFICE VISIT (OUTPATIENT)
Dept: FAMILY MEDICINE | Facility: CLINIC | Age: 72
End: 2017-03-01
Payer: COMMERCIAL

## 2017-03-01 VITALS — DIASTOLIC BLOOD PRESSURE: 58 MMHG | OXYGEN SATURATION: 97 % | SYSTOLIC BLOOD PRESSURE: 120 MMHG | HEART RATE: 63 BPM

## 2017-03-01 DIAGNOSIS — R41.0 DELIRIUM: ICD-10-CM

## 2017-03-01 DIAGNOSIS — Z09 HOSPITAL DISCHARGE FOLLOW-UP: Primary | ICD-10-CM

## 2017-03-01 DIAGNOSIS — I10 BENIGN ESSENTIAL HYPERTENSION: ICD-10-CM

## 2017-03-01 DIAGNOSIS — N39.0 RECURRENT UTI: ICD-10-CM

## 2017-03-01 DIAGNOSIS — M25.519 CHRONIC SHOULDER PAIN, UNSPECIFIED LATERALITY: ICD-10-CM

## 2017-03-01 DIAGNOSIS — E11.42 DIABETIC POLYNEUROPATHY ASSOCIATED WITH TYPE 2 DIABETES MELLITUS (H): ICD-10-CM

## 2017-03-01 DIAGNOSIS — G89.29 CHRONIC SHOULDER PAIN, UNSPECIFIED LATERALITY: ICD-10-CM

## 2017-03-01 PROCEDURE — 99350 HOME/RES VST EST HIGH MDM 60: CPT | Performed by: NURSE PRACTITIONER

## 2017-03-01 NOTE — MR AVS SNAPSHOT
After Visit Summary   3/1/2017    Gem Jade    MRN: [id number]           Patient Information     Date Of Birth          1945        Visit Information        Provider Department      3/1/2017 2:00 PM Marivel Barcenas APRN CNP [facility]        Today's Diagnoses     Hospital discharge follow-up    -  1    Delirium        Diabetic polyneuropathy associated with type 2 diabetes mellitus (H)        Chronic shoulder pain, unspecified laterality        Benign essential hypertension        Recurrent UTI          Care Instructions    Please try and eat small frequent meals at least 2x daily    Drink plenty of fluids    We will restart your Vit D 2,000mg daily but hold your Calcium and Vit D    I will see you again in one month,         Follow-ups after your visit        Follow-up notes from your care team     Return in about 4 weeks (around 3/29/2017).      Your next 10 appointments already scheduled     Mar 21, 2017  2:00 PM CDT   Office Visit with Conchita Toledo, [facility] ([facility])    [address] [place] [phone]           Bring a current list of meds and any records pertaining to this visit.  For Physicals, please bring immunization records and any forms needing to be filled out.  Please arrive 10 minutes early to complete paperwork.              Future tests that were ordered for you today     Open Future Orders        Priority Expected Expires Ordered    Clostridium difficile Toxin B PCR Routine  3/6/2018 3/6/2017            Who to contact     If you have questions or need follow up information about today's clinic visit or your schedule please contact [facility] directly at [phone].  Normal or non-critical lab and imaging results will be communicated to you by MyChart, letter or phone within 4 business days after the clinic has  "received the results. If you do not hear from us within 7 days, please contact the clinic through [employer] or phone. If you have a critical or abnormal lab result, we will notify you by phone as soon as possible.  Submit refill requests through [employer] or call your pharmacy and they will forward the refill request to us. Please allow 3 business days for your refill to be completed.          Additional Information About Your Visit        [employer] Information     [employer] lets you send messages to your doctor, view your test results, renew your prescriptions, schedule appointments and more. To sign up, go to [email or web address] . Click on \"Log in\" on the left side of the screen, which will take you to the Welcome page. Then click on \"Sign up Now\" on the right side of the page.     You will be asked to enter the access code listed below, as well as some personal information. Please follow the directions to create your username and password.     Your access code is: [email or web address]  Expires: 2017  8:50 AM     Your access code will  in 90 days. If you need help or a new code, please call your [facility] clinic or [phone].        Care EveryWhere ID     This is your Care EveryWhere ID. This could be used by other organizations to access your [facility] medical records  [id number]        Your Vitals Were     Pulse Pulse Oximetry                63 97%           Blood Pressure from Last 3 Encounters:   17 120/58   17 186/54   17 130/80    Weight from Last 3 Encounters:   17 205 lb 4.8 oz (93.1 kg)   16 201 lb 8 oz (91.4 kg)   16 200 lb (90.7 kg)              Today, you had the following     No orders found for display       Primary Care Provider Office Phone # Fax #    ANTHONY Antonio [phone]       [facility] [address] [facility] [address]  [facility] [address]        Thank you!     Thank you for choosing [facility]  " for your care. Our goal is always to provide you with excellent care. Hearing back from our patients is one way we can continue to improve our services. Please take a few minutes to complete the written survey that you may receive in the mail after your visit with us. Thank you!             Your Updated Medication List - Protect others around you: Learn how to safely use, store and throw away your medicines at www.disposemymeds.org.          This list is accurate as of: 3/1/17 11:59 PM.  Always use your most recent med list.                   Brand Name Dispense Instructions for use    * ACE NOT PRESCRIBED (INTENTIONAL)      1 each continuous prn ACE Inhibitor not prescribed due to Worsening renal function on ACE/ARB therapy       ACETAMINOPHEN PO      Take 325-650 mg by mouth every 4 hours as needed.       alcohol swab prep pads     1 Box    Use to swab area of injection/yvon and vials as directed.       ASPIRIN PO      Take 81 mg by mouth daily.       benzonatate 100 MG capsule    TESSALON    50 capsule    Take 1 capsule (100 mg) by mouth 3 times daily as needed for cough       blood glucose monitoring meter device kit     1 kit    Use to test blood sugar 3 times daily or as directed.       blood glucose monitoring test strip    ACCU-CHEK COMPACT PLUS    100 strip    Use to test blood sugar 3 times daily or as directed.       ciprofloxacin 500 MG tablet    CIPRO    20 tablet    Take 1 tablet (500 mg) by mouth 2 times daily for 10 days       cloNIDine 0.1 MG tablet    CATAPRES    180 tablet    Take 1 tablet (0.1 mg) by mouth 2 times daily       clotrimazole 1 % cream    LOTRIMIN    40 g    Apply topically 2 times daily Apply to buttocks twice a day until rash resolved.       Cranberry 400 MG Tabs      Take 400 mg by mouth 2 times daily       cyanocobalamin 1000 MCG/ML injection    VITAMIN B12    3 mL    Inject 1 mL (1,000 mcg) into the muscle every 30 days       famotidine 20 MG tablet    PEPCID    60 tablet     Take 1 tablet (20 mg) by mouth 2 times daily       gabapentin 300 MG capsule    NEURONTIN    540 capsule    Take 3 tabs 2x daily       GLUCAGON EMERGENCY 1 MG kit   Generic drug:  glucagon     1 each    Inject 1 mg into the muscle once       insulin glargine 100 UNIT/ML injection    LANTUS SOLOSTAR    15 mL    Inject 14 units under the skin daily.       insulin pen needle 30G X 8 MM     100 each    Use as directed with Lantus       labetalol 200 MG tablet    NORMODYNE    180 tablet    Take 1 tablet (200 mg) by mouth 2 times daily       lactobacillus rhamnosus (GG) capsule     60 capsule    Take 1 capsule by mouth 2 times daily       levothyroxine 50 MCG tablet    SYNTHROID    90 tablet    Take 1 tablet (50 mcg) by mouth daily       loperamide 2 MG capsule    IMODIUM    60 capsule    Take 1 capsule (2 mg) by mouth 3 times daily as needed for diarrhea       loratadine 10 MG tablet    CLARITIN    90 tablet    Take 1 tablet (10 mg) by mouth daily       NovoLOG FLEXPEN 100 UNIT/ML injection   Generic drug:  insulin aspart     15 mL    Inject 7 units before meals BID. At bedtime use following sliding scale:  150-200 5u 201-250 7u 251-300 9u 301-350 11u 351+ 13u       NYSTOP 932211 UNIT/GM Powd powder     60 g    Externally apply 1 g topically 3 times daily as needed       ondansetron 4 MG ODT tab    ZOFRAN ODT    20 tablet    Take 1-2 tablets (4-8 mg) by mouth every 8 hours as needed for nausea       * order for DME     3 catheter    Equipment being ordered: Johnson Catheters - 18 french       * order for DME     100 pad    Equipment being ordered: [employer]ux Please send to [facility]       * order for DME     1 each    Equipment being ordered: [employer] CPAP interface (nasal pillows), size XS.       OXcarbazepine 300 MG tablet    TRILEPTAL    180 tablet    Take 1 tablet (300 mg) by mouth 2 times daily       phenazopyridine 100 MG tablet    PYRIDIUM    6 tablet    Take 1 tablet (100 mg) by mouth 3 times daily as needed for  "irritation       pravastatin 10 MG tablet    PRAVACHOL    90 tablet    Take 1 tablet (10 mg) by mouth daily       predniSONE 2.5 MG tablet    DELTASONE    30 tablet    Take 1 tablet (2.5 mg) by mouth daily       simethicone 125 MG Chew chewable tablet    MYLICON    120 tablet    Take 1 tablet (125 mg) by mouth as needed for intestinal gas       * sirolimus Tabs tablet     30 tablet    Take 1 tablet (1 mg) by mouth daily GENERIC (total dose 1.5 mg daily)       * sirolimus Tabs tablet     30 tablet    Take 1 tablet (0.5 mg) by mouth daily GENERIC (1.5 mg daily)       syringe/needle (disp) 22G X 1-1/2\" 3 ML Misc    B-D SYRINGE/NEEDLE    50 each    Inject 1 Syringe into the muscle every 30 days       * tacrolimus 0.5 MG capsule    PROGRAF - GENERIC EQUIVALENT    60 capsule    HOLD       * tacrolimus 1 MG capsule    PROGRAF - GENERIC EQUIVALENT    120 capsule    Take 2 capsules (2 mg) by mouth 2 times daily       TraMADol HCl 50 MG Tbdp     90 tablet    Take 50 mg by mouth 3 times daily as needed       * Notice:  This list has 8 medication(s) that are the same as other medications prescribed for you. Read the directions carefully, and ask your doctor or other care provider to review them with you.      "

## 2017-03-01 NOTE — PROGRESS NOTES
SUBJECTIVE:                                                    Gem Jade is a 68 year old female who is seen in her home due to inability to leave the home unassisted, morbid obesity, severe vertigo, generalized weakness, chronic pain, bed bound, rarely able to get up in the WC, seen today for the following health issues: Seen with      Hospital Follow-up Visit:    Hospital/Nursing Home/ Rehab Facility: [facility]  Date of Admission: 2/16/17  Date of Discharge: 2/20/17  Reason(s) for Admission: UTI, altered mental status             Problems taking medications regularly:  None       Medication changes since discharge:will restart her Vit D 2,000IU daily and continue to hold her calcium and D supplement        Problems adhering to non-medication therapy:  None  Summary of hospitalization:  [facility] discharge summary reviewed  Diagnostic Tests/Treatments reviewed.  Follow up needed: none  Other Healthcare Providers Involved in Patient s Care:         Homecare  Update since discharge: fluctuating course. Still very confused, but no other issues, only eating 1x daily, sleep at baseline, seeing things today at the visit, people that are not there, unable to follow the conversation,     Post Discharge Medication Reconciliation: discharge medications reconciled and changed, per note/orders (see AVS).  Plan of care communicated with patient, family and caregiver     Coding guidelines for this visit:  Type of Medical   Decision Making Face-to-Face Visit       within 7 Days of discharge Face-to-Face Visit        within 14 days of discharge   Moderate Complexity [id number]   High Complexity [id number]          [facility] Discharge Summary     Gem Jade MRN# [id number]   Age: 71 year old YOB: 1945      Date of Admission:  2/16/2017  Date of Discharge::  2/20/2017  Admitting Physician:  Santosh Chen MD  Discharge Physician:  Radha Boyce  MD          Admission Diagnoses:   Hypercalcemia [E83.52]  Hyponatremia [E87.1]  Complicated UTI (urinary tract infection) [N39.0]  S/p kidney transplant  Bedbound status          Discharge Diagnosis:        Hypercalcemia [E83.52]  Hyponatremia [E87.1]  Complicated UTI (urinary tract infection) [N39.0]  S/p kidney transplant  Bedbound status    START taking these medications     Details   ciprofloxacin (CIPRO) 500 MG tablet Take 1 tablet (500 mg) by mouth 2 times daily for 10 days  Qty: 20 tablet, Refills: 0     Associated Diagnoses: Complicated UTI (urinary tract infection)              Hospital Course:       # Encephalopathy: - resolved. presented due to concern for AMS. Found to have probable complicated UTI. Also with hypercalcemia as well. Also has known dysregulation of sleep/wake cycle. Please see management as noted below.       # Complicated UTI / pyelonephritis  # Transplant hydronephrosis:  Patient with hx of infection with ESBL E. Coli and multiple infections with Klebsiella and well as candida, concerning for both multidrug resistent Klebsiella (as pt has had multiple antibiotic exposures) and ESBL. Was on keflex outpatient, started on cefepime in ED. Patient has new hydronephrosis in transplanted kidney per US. Was switched to ertapenem given history. Johnson has been exchanged. CT with non-obstructing stone, mild to moderate hydro. Discussed with nephrology, no plan for PNT. Urine culture revealed E coli. Sensitivities as noted above. Changed from ertapenem to ceftriaxone per ID. Also sensitive to cipro. ID felt candida was genital/perineum contamination. Will discharge to home to complete 14 day course. Discussed this with her . Has tolerated cipro in the past as recently as August.   - Ertapenem IV (2/17 -> 2/19)  - Change to Cipro 500 mg BID (end date 3/2)  --Continue prednisone/tac/sirolimus  --Tac 5.4, sirolimus 4.8      #Hypercalcemia: with low PTH. Concerning for vit d toxicity. Ca  downtrending with fluids. Mentation somewhat improved.   --check 1,25 Vit D, and 25 vit D, PTH like peptide - pending  --stop home calcium and vitamin D  --Follow up with PCP      # Normocytic Anemia: hgb 11.   Ferritin high, iron binding capacity low. C/w ACD  Folate normal. B12 high.      You were hospitalized for altered mental status. Found to have urinary tract infection. You were seen by infectious disease and transplant nephrology. Your rico was exchanged. No need for a percutaneous nephrostomy tube at this time point. Will have you continue oral antibiotics at home for an additional ten days.    You were also found to have high calcium. Concern that you may be on too much vitamin D. Will have you stop taking your vitamin D and calcium for now until you follow up with your primary care provider.          Mental Health Symptoms      Duration: increased confusion since being home from the hospital which is baseline for her when she is on antibiotics, still confused today, hallucinations, calm, struggles to participate in the conversation     Description:  Depression: no  Anxiety: no  Sleep problems: YES  Concentration problems: YES- since being home from the hospital    Therapies tried and outcome: none    See PHQ-9 and LISSY scores, as appropriate       Problem list and histories reviewed & adjusted, as indicated.  Additional history:    Renal US: 2/17  IMPRESSION:  1. New hydronephrosis of the right lower quadrant transplant kidney.  2. Elevation of the resistive indices in the arcuate arteries, mildly  increased since prior.     CT abd/pelvis: 2/17  IMPRESSION:  1. New mild to moderate renal transplant hydronephrosis, without  hydroureter. A punctate nonobstructive stone is seen is the inferior  pole calyx, without stone identified in the renal pelvis or ureter to  explain obstruction. The urinary bladder is mildly distended. Mild  perinephric free fluid not significantly changed from 2013, otherwise  no  significant change in right lower quadrant renal transplant  appearance.  2. Patchy opacities in the left lung base suspicious for infection.  Similar opacities were present in 2013.  3. 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 Continued attention on follow-up suggested.    Recent Labs   Lab Test  02/19/17   0440  02/18/17   0547   02/16/17 2007 12/05/16   1210   09/05/16   0617  09/04/16   0815   08/30/16   0032   06/13/16   1140   05/26/16   0744   12/20/15   0853   02/05/15   1250   05/16/13   1240   A1C   --    --    --    --    --   7.4*   --    --    --    --    --    --   6.7*   --    --    --   5.5   < >  6.9*   < >  7.2*   LDL   --    --    --    --    --   64   --    --    --    --    --    --    --    --    --    --    --    --   18   --   149*   HDL   --    --    --    --    --   46*   --    --    --    --    --    --    --    --    --    --    --    --   43*   --   42*   TRIG   --    --    --    --    --   272*   --    --    --    --    --    --    --    --    --    --    --    --   223*   --   262*   ALT   --    --    --   14   --    --    --   10  10   < >  6   < >   --    --    --    < >   --    < >   --    < >   --    CR  0.91  1.02   < >  1.17*   < >   --    < >  0.54  0.58   < >   --    < >   --    < >  0.70   < >  0.74   < >  0.67   < >  0.70   GFRESTIMATED  61  54*   < >  46*   < >   --    < >  >90  Non  GFR Calc    >90  Non  GFR Calc     < >   --    < >   --    < >  83   < >  78   < >  87   < >  84   GFRESTBLACK  74  65   < >  55*   < >   --    < >  >90   GFR Calc    >90   GFR Calc     < >   --    < >   --    < >  >90   GFR Calc     < >  >90   GFR Calc     < >  >90   GFR Calc     < >  >90   POTASSIUM  3.4  3.7   < >  4.3   < >   --    < >  4.0  4.0   < >   --    < >   --    < >  3.5   < >  3.3*   < >  4.3   < >  3.9   TSH   --    --    --     --    --    --    --    --    --    --   1.01   --    --    --   0.96   --    --    --    --    < >   --     < > = values in this interval not displayed.      BP Readings from Last 3 Encounters:   02/20/17 186/54   02/01/17 130/80   01/04/17 110/58    Wt Readings from Last 3 Encounters:   02/20/17 205 lb 4.8 oz (93.1 kg)   09/03/16 201 lb 8 oz (91.4 kg)   08/09/16 200 lb (90.7 kg)         Labs reviewed in EPIC    Reviewed and updated as needed this visit by clinical staff       Reviewed and updated as needed this visit by Provider       ROS:  Constitutional, HEENT, cardiovascular, pulmonary, gi and gu systems are negative, except as otherwise noted.    OBJECTIVE:                                                    /58 (BP Location: Right arm, Patient Position: Supine, Cuff Size: Adult Regular)  Pulse 63  SpO2 97%  There is no height or weight on file to calculate BMI.  GENERAL: mild distress, obese, fatigued and laying in bed, dozing off and on, confused  EYES: Eyes grossly normal to inspection, PERRL and conjunctivae and sclerae normal  RESP: lungs clear to auscultation - no rales, rhonchi or wheezes  CV: regular rate and rhythm, normal S1 S2, no S3 or S4, no murmur, click or rub,  ABDOMEN: soft, nontender,  no masses and bowel sounds normal  MS: no gross musculoskeletal defects noted, no edema  PSYCH: concentration poor, confused, disoriented, affect normal/bright and hallucinations today,     Diagnostic Test Results:  Results for orders placed or performed in visit on 02/21/17   Yeast culture   Result Value Ref Range    Specimen Description Unknown     Culture Micro       Canceled, Test credited Should be ordered as a future order    Micro Report Status FINAL 02/22/2017      *Note: Due to a large number of results and/or encounters for the requested time period, some results have not been displayed. A complete set of results can be found in Results Review.        ASSESSMENT/PLAN:                                                       (Z09) Hospital discharge follow-up  (primary encounter diagnosis)  Comment: UTI improving, altered mental state still present, VSS, diet poor, adequate fluids   Plan: Will hold her Vit D and calcium multi vitamin but restart her 2,000IU of Vit D, lab was WNL 51    (R41.0) Delirium  Comment: continues still on antibiotics  Plan: monitor    (E11.42) Diabetic polyneuropathy associated with type 2 diabetes mellitus (H)  Comment: stable, some elevated sugars due to poor diet   Plan: maintain meds, discussed diet today and sleep pattern     (M25.519,  G89.29) Chronic shoulder pain, unspecified laterality  Comment: stable, occ takes tramadol  Plan: discussed using acetaminophen regularly    (I10) Benign essential hypertension  Comment: stable  Plan: no med changes    (N39.0) Recurrent UTI  Comment: improving,   Plan: finish Cipro     Patient Instructions   Please try and eat small frequent meals at least 2x daily    Drink plenty of fluids    We will restart your Vit D 2,000mg daily but hold your Calcium and Vit D    I will see you again in one month,       Marivel Barcenas, ALISON, APRN CNP  [facility]      Visit time 60   min with > than 50% of the time spent in counseling on: hospital discharge follow up, UTI, altered mental state, DM, pain, kidney transplant

## 2017-03-03 LAB
ALBUMIN UR-MCNC: 100 MG/DL
APPEARANCE UR: ABNORMAL
BILIRUB UR QL STRIP: NEGATIVE
COLOR UR AUTO: ABNORMAL
GLUCOSE UR STRIP-MCNC: 50 MG/DL
HGB UR QL STRIP: ABNORMAL
KETONES UR STRIP-MCNC: NEGATIVE MG/DL
LEUKOCYTE ESTERASE UR QL STRIP: ABNORMAL
MUCOUS THREADS #/AREA URNS LPF: PRESENT /LPF
NITRATE UR QL: NEGATIVE
PH UR STRIP: 8 PH (ref 5–7)
RBC #/AREA URNS AUTO: 46 /HPF (ref 0–2)
SP GR UR STRIP: 1.01 (ref 1–1.03)
SQUAMOUS #/AREA URNS AUTO: <1 /HPF (ref 0–1)
URN SPEC COLLECT METH UR: ABNORMAL
UROBILINOGEN UR STRIP-MCNC: 0 MG/DL (ref 0–2)
WBC #/AREA URNS AUTO: 43 /HPF (ref 0–2)

## 2017-03-03 PROCEDURE — 87086 URINE CULTURE/COLONY COUNT: CPT

## 2017-03-03 PROCEDURE — 81001 URINALYSIS AUTO W/SCOPE: CPT

## 2017-03-03 PROCEDURE — 87106 FUNGI IDENTIFICATION YEAST: CPT

## 2017-03-05 LAB
BACTERIA SPEC CULT: ABNORMAL
Lab: ABNORMAL
MICRO REPORT STATUS: ABNORMAL
SPECIMEN SOURCE: ABNORMAL

## 2017-03-06 DIAGNOSIS — Z94.0 KIDNEY TRANSPLANT RECIPIENT: ICD-10-CM

## 2017-03-06 DIAGNOSIS — R19.7 DIARRHEA: ICD-10-CM

## 2017-03-06 DIAGNOSIS — R19.7 DIARRHEA OF PRESUMED INFECTIOUS ORIGIN: Primary | ICD-10-CM

## 2017-03-06 DIAGNOSIS — D84.9 IMMUNOSUPPRESSED STATUS (H): ICD-10-CM

## 2017-03-06 DIAGNOSIS — Z79.60 LONG-TERM USE OF IMMUNOSUPPRESSANT MEDICATION: ICD-10-CM

## 2017-03-06 LAB
ANION GAP SERPL CALCULATED.3IONS-SCNC: 6 MMOL/L (ref 3–14)
BUN SERPL-MCNC: 43 MG/DL (ref 7–30)
CALCIUM SERPL-MCNC: 11.2 MG/DL (ref 8.5–10.1)
CHLORIDE SERPL-SCNC: 100 MMOL/L (ref 94–109)
CO2 SERPL-SCNC: 29 MMOL/L (ref 20–32)
CREAT SERPL-MCNC: 3.49 MG/DL (ref 0.52–1.04)
ERYTHROCYTE [DISTWIDTH] IN BLOOD BY AUTOMATED COUNT: 14.5 % (ref 10–15)
GFR SERPL CREATININE-BSD FRML MDRD: 13 ML/MIN/1.7M2
GLUCOSE SERPL-MCNC: 93 MG/DL (ref 70–99)
HCT VFR BLD AUTO: 32.2 % (ref 35–47)
HGB BLD-MCNC: 10.1 G/DL (ref 11.7–15.7)
MCH RBC QN AUTO: 29.2 PG (ref 26.5–33)
MCHC RBC AUTO-ENTMCNC: 31.4 G/DL (ref 31.5–36.5)
MCV RBC AUTO: 93 FL (ref 78–100)
PLATELET # BLD AUTO: 142 10E9/L (ref 150–450)
POTASSIUM SERPL-SCNC: 4.5 MMOL/L (ref 3.4–5.3)
RBC # BLD AUTO: 3.46 10E12/L (ref 3.8–5.2)
SODIUM SERPL-SCNC: 135 MMOL/L (ref 133–144)
WBC # BLD AUTO: 3.9 10E9/L (ref 4–11)

## 2017-03-06 PROCEDURE — 36415 COLL VENOUS BLD VENIPUNCTURE: CPT | Performed by: INTERNAL MEDICINE

## 2017-03-06 PROCEDURE — 80197 ASSAY OF TACROLIMUS: CPT | Performed by: INTERNAL MEDICINE

## 2017-03-06 PROCEDURE — 80048 BASIC METABOLIC PNL TOTAL CA: CPT | Performed by: INTERNAL MEDICINE

## 2017-03-06 PROCEDURE — 85027 COMPLETE CBC AUTOMATED: CPT | Performed by: INTERNAL MEDICINE

## 2017-03-06 PROCEDURE — 80195 ASSAY OF SIROLIMUS: CPT | Performed by: INTERNAL MEDICINE

## 2017-03-06 RX ORDER — DIPHENOXYLATE HCL/ATROPINE 2.5-.025MG
1 TABLET ORAL 4 TIMES DAILY PRN
Qty: 50 TABLET | Refills: 3 | Status: ON HOLD | OUTPATIENT
Start: 2017-03-06 | End: 2017-11-09

## 2017-03-06 NOTE — PATIENT INSTRUCTIONS
Please try and eat small frequent meals at least 2x daily    Drink plenty of fluids    We will restart your Vit D 2,000mg daily but hold your Calcium and Vit D    I will see you again in one month,

## 2017-03-06 NOTE — TELEPHONE ENCOUNTER
Ivet RN with [facility] is calling. She is asking if the pt can take Lomotil for her diarrhea she is having d/t the abx. Spoke with PCP. She would like the pt to be tested for c.diff.    Will place order for c diff and new order for Lomotil.

## 2017-03-07 ENCOUNTER — HOSPITAL ENCOUNTER (OUTPATIENT)
Dept: LAB | Facility: CLINIC | Age: 72
Discharge: HOME | End: 2017-03-07
Attending: NURSE PRACTITIONER | Admitting: NURSE PRACTITIONER
Payer: MEDICARE

## 2017-03-07 ENCOUNTER — TELEPHONE (OUTPATIENT)
Dept: FAMILY MEDICINE | Facility: CLINIC | Age: 72
End: 2017-03-07

## 2017-03-07 DIAGNOSIS — R19.7 DIARRHEA OF PRESUMED INFECTIOUS ORIGIN: ICD-10-CM

## 2017-03-07 LAB
C DIFF TOX B STL QL: NORMAL
SIROLIMUS BLD-MCNC: 3.8 UG/L (ref 5–15)
SPECIMEN SOURCE: NORMAL
TACROLIMUS BLD-MCNC: 4.9 UG/L (ref 5–15)
TME LAST DOSE: ABNORMAL H
TME LAST DOSE: ABNORMAL H

## 2017-03-07 PROCEDURE — 87493 C DIFF AMPLIFIED PROBE: CPT | Performed by: NURSE PRACTITIONER

## 2017-03-07 NOTE — TELEPHONE ENCOUNTER
Spoke with SEKOU Cooney [facility] who states spouse was planning on collecting sample when she had a loose stool again.  He was instructed to either call homecare to  sample or he stated he would bring sample in to lab.      Wendie Harris RN

## 2017-03-07 NOTE — TELEPHONE ENCOUNTER
Telephone to [facility] nurse Ivet, purpose of call was to clarify when C. Diff sample will be obtained.     Patient to start Lomotil after C Diff sample is taken.     Will call spouse with an update once [facility] nurse calls back.     Nanci Paiz RN

## 2017-03-08 ENCOUNTER — HOSPITAL ENCOUNTER (INPATIENT)
Facility: CLINIC | Age: 72
LOS: 7 days | Discharge: HOME HEALTH | DRG: 698 | End: 2017-03-15
Attending: EMERGENCY MEDICINE | Admitting: INTERNAL MEDICINE
Payer: MEDICARE

## 2017-03-08 ENCOUNTER — TELEPHONE (OUTPATIENT)
Dept: FAMILY MEDICINE | Facility: CLINIC | Age: 72
End: 2017-03-08

## 2017-03-08 ENCOUNTER — TELEPHONE (OUTPATIENT)
Dept: TRANSPLANT | Facility: CLINIC | Age: 72
End: 2017-03-08

## 2017-03-08 ENCOUNTER — APPOINTMENT (OUTPATIENT)
Dept: ULTRASOUND IMAGING | Facility: CLINIC | Age: 72
DRG: 698 | End: 2017-03-08
Attending: EMERGENCY MEDICINE
Payer: MEDICARE

## 2017-03-08 DIAGNOSIS — R41.0 DISORIENTATION: Primary | ICD-10-CM

## 2017-03-08 DIAGNOSIS — E08.42 DIABETIC POLYNEUROPATHY ASSOCIATED WITH DIABETES MELLITUS DUE TO UNDERLYING CONDITION (H): ICD-10-CM

## 2017-03-08 DIAGNOSIS — Z97.8 CHRONIC INDWELLING FOLEY CATHETER: ICD-10-CM

## 2017-03-08 DIAGNOSIS — N17.9 ACUTE KIDNEY INJURY (H): ICD-10-CM

## 2017-03-08 DIAGNOSIS — Z94.0 KIDNEY TRANSPLANT RECIPIENT: ICD-10-CM

## 2017-03-08 DIAGNOSIS — E83.52 HYPERCALCEMIA: ICD-10-CM

## 2017-03-08 DIAGNOSIS — Z94.0 KIDNEY REPLACED BY TRANSPLANT: ICD-10-CM

## 2017-03-08 LAB
ALBUMIN SERPL-MCNC: 3 G/DL (ref 3.4–5)
ALBUMIN UR-MCNC: 300 MG/DL
ALP SERPL-CCNC: 87 U/L (ref 40–150)
ALT SERPL W P-5'-P-CCNC: <6 U/L (ref 0–50)
ANION GAP SERPL CALCULATED.3IONS-SCNC: 8 MMOL/L (ref 3–14)
APPEARANCE UR: ABNORMAL
AST SERPL W P-5'-P-CCNC: 13 U/L (ref 0–45)
BACTERIA #/AREA URNS HPF: ABNORMAL /HPF
BASOPHILS # BLD AUTO: 0 10E9/L (ref 0–0.2)
BASOPHILS NFR BLD AUTO: 0.2 %
BILIRUB SERPL-MCNC: 0.3 MG/DL (ref 0.2–1.3)
BILIRUB UR QL STRIP: NEGATIVE
BUN SERPL-MCNC: 42 MG/DL (ref 7–30)
CALCIUM SERPL-MCNC: 10.2 MG/DL (ref 8.5–10.1)
CHLORIDE SERPL-SCNC: 98 MMOL/L (ref 94–109)
CHLORIDE UR-SCNC: 54 MMOL/L
CO2 SERPL-SCNC: 28 MMOL/L (ref 20–32)
COLOR UR AUTO: YELLOW
CREAT SERPL-MCNC: 3.37 MG/DL (ref 0.52–1.04)
CRP SERPL-MCNC: 7.5 MG/L (ref 0–8)
DIFFERENTIAL METHOD BLD: ABNORMAL
EOSINOPHIL # BLD AUTO: 0.2 10E9/L (ref 0–0.7)
EOSINOPHIL NFR BLD AUTO: 3 %
ERYTHROCYTE [DISTWIDTH] IN BLOOD BY AUTOMATED COUNT: 15.1 % (ref 10–15)
GFR SERPL CREATININE-BSD FRML MDRD: 13 ML/MIN/1.7M2
GLUCOSE BLDC GLUCOMTR-MCNC: 115 MG/DL (ref 70–99)
GLUCOSE BLDC GLUCOMTR-MCNC: 135 MG/DL (ref 70–99)
GLUCOSE BLDC GLUCOMTR-MCNC: 81 MG/DL (ref 70–99)
GLUCOSE SERPL-MCNC: 61 MG/DL (ref 70–99)
GLUCOSE UR STRIP-MCNC: NEGATIVE MG/DL
HCT VFR BLD AUTO: 34.5 % (ref 35–47)
HGB BLD-MCNC: 10.9 G/DL (ref 11.7–15.7)
HGB UR QL STRIP: ABNORMAL
IMM GRANULOCYTES # BLD: 0 10E9/L (ref 0–0.4)
IMM GRANULOCYTES NFR BLD: 0.2 %
INR PPP: 1.01 (ref 0.86–1.14)
KETONES UR STRIP-MCNC: NEGATIVE MG/DL
LACTATE BLD-SCNC: 0.6 MMOL/L (ref 0.7–2.1)
LEUKOCYTE ESTERASE UR QL STRIP: ABNORMAL
LYMPHOCYTES # BLD AUTO: 1.1 10E9/L (ref 0.8–5.3)
LYMPHOCYTES NFR BLD AUTO: 21.6 %
MCH RBC QN AUTO: 29.1 PG (ref 26.5–33)
MCHC RBC AUTO-ENTMCNC: 31.6 G/DL (ref 31.5–36.5)
MCV RBC AUTO: 92 FL (ref 78–100)
MONOCYTES # BLD AUTO: 0.3 10E9/L (ref 0–1.3)
MONOCYTES NFR BLD AUTO: 5.6 %
MUCOUS THREADS #/AREA URNS LPF: PRESENT /LPF
NEUTROPHILS # BLD AUTO: 3.5 10E9/L (ref 1.6–8.3)
NEUTROPHILS NFR BLD AUTO: 69.4 %
NITRATE UR QL: NEGATIVE
NRBC # BLD AUTO: 0 10*3/UL
NRBC BLD AUTO-RTO: 0 /100
PH UR STRIP: 6.5 PH (ref 5–7)
PLATELET # BLD AUTO: 128 10E9/L (ref 150–450)
PLATELET # BLD AUTO: 137 10E9/L (ref 150–450)
POTASSIUM SERPL-SCNC: 4.6 MMOL/L (ref 3.4–5.3)
POTASSIUM UR-SCNC: 19 MMOL/L
PROT SERPL-MCNC: 7.9 G/DL (ref 6.8–8.8)
RBC # BLD AUTO: 3.75 10E12/L (ref 3.8–5.2)
RBC #/AREA URNS AUTO: 154 /HPF (ref 0–2)
SODIUM SERPL-SCNC: 135 MMOL/L (ref 133–144)
SODIUM UR-SCNC: 61 MMOL/L
SP GR UR STRIP: 1.01 (ref 1–1.03)
TRANS CELLS #/AREA URNS HPF: 2 /HPF (ref 0–1)
URN SPEC COLLECT METH UR: ABNORMAL
UROBILINOGEN UR STRIP-MCNC: NORMAL MG/DL (ref 0–2)
WBC # BLD AUTO: 5 10E9/L (ref 4–11)
WBC #/AREA URNS AUTO: >182 /HPF (ref 0–2)
WBC CLUMPS #/AREA URNS HPF: PRESENT /HPF
YEAST #/AREA URNS HPF: ABNORMAL /HPF

## 2017-03-08 PROCEDURE — 25000128 H RX IP 250 OP 636: Performed by: EMERGENCY MEDICINE

## 2017-03-08 PROCEDURE — 85025 COMPLETE CBC W/AUTO DIFF WBC: CPT | Performed by: EMERGENCY MEDICINE

## 2017-03-08 PROCEDURE — 36415 COLL VENOUS BLD VENIPUNCTURE: CPT | Performed by: STUDENT IN AN ORGANIZED HEALTH CARE EDUCATION/TRAINING PROGRAM

## 2017-03-08 PROCEDURE — 96361 HYDRATE IV INFUSION ADD-ON: CPT

## 2017-03-08 PROCEDURE — 85610 PROTHROMBIN TIME: CPT | Performed by: EMERGENCY MEDICINE

## 2017-03-08 PROCEDURE — 80053 COMPREHEN METABOLIC PANEL: CPT | Performed by: EMERGENCY MEDICINE

## 2017-03-08 PROCEDURE — 25000128 H RX IP 250 OP 636: Performed by: STUDENT IN AN ORGANIZED HEALTH CARE EDUCATION/TRAINING PROGRAM

## 2017-03-08 PROCEDURE — 25000132 ZZH RX MED GY IP 250 OP 250 PS 637: Mod: GY | Performed by: STUDENT IN AN ORGANIZED HEALTH CARE EDUCATION/TRAINING PROGRAM

## 2017-03-08 PROCEDURE — 99285 EMERGENCY DEPT VISIT HI MDM: CPT | Mod: 25

## 2017-03-08 PROCEDURE — 82436 ASSAY OF URINE CHLORIDE: CPT | Performed by: STUDENT IN AN ORGANIZED HEALTH CARE EDUCATION/TRAINING PROGRAM

## 2017-03-08 PROCEDURE — 12000001 ZZH R&B MED SURG/OB UMMC

## 2017-03-08 PROCEDURE — 94660 CPAP INITIATION&MGMT: CPT

## 2017-03-08 PROCEDURE — 96360 HYDRATION IV INFUSION INIT: CPT

## 2017-03-08 PROCEDURE — 00000146 ZZHCL STATISTIC GLUCOSE BY METER IP

## 2017-03-08 PROCEDURE — 80197 ASSAY OF TACROLIMUS: CPT | Performed by: STUDENT IN AN ORGANIZED HEALTH CARE EDUCATION/TRAINING PROGRAM

## 2017-03-08 PROCEDURE — 87186 SC STD MICRODIL/AGAR DIL: CPT | Performed by: EMERGENCY MEDICINE

## 2017-03-08 PROCEDURE — 76776 US EXAM K TRANSPL W/DOPPLER: CPT

## 2017-03-08 PROCEDURE — 81003 URINALYSIS AUTO W/O SCOPE: CPT | Performed by: EMERGENCY MEDICINE

## 2017-03-08 PROCEDURE — 85049 AUTOMATED PLATELET COUNT: CPT | Performed by: STUDENT IN AN ORGANIZED HEALTH CARE EDUCATION/TRAINING PROGRAM

## 2017-03-08 PROCEDURE — 84133 ASSAY OF URINE POTASSIUM: CPT | Performed by: STUDENT IN AN ORGANIZED HEALTH CARE EDUCATION/TRAINING PROGRAM

## 2017-03-08 PROCEDURE — 99221 1ST HOSP IP/OBS SF/LOW 40: CPT | Mod: AI | Performed by: INTERNAL MEDICINE

## 2017-03-08 PROCEDURE — 93005 ELECTROCARDIOGRAM TRACING: CPT

## 2017-03-08 PROCEDURE — 87040 BLOOD CULTURE FOR BACTERIA: CPT | Performed by: EMERGENCY MEDICINE

## 2017-03-08 PROCEDURE — 87088 URINE BACTERIA CULTURE: CPT | Performed by: EMERGENCY MEDICINE

## 2017-03-08 PROCEDURE — 87086 URINE CULTURE/COLONY COUNT: CPT | Performed by: EMERGENCY MEDICINE

## 2017-03-08 PROCEDURE — 40000275 ZZH STATISTIC RCP TIME EA 10 MIN

## 2017-03-08 PROCEDURE — A9270 NON-COVERED ITEM OR SERVICE: HCPCS | Mod: GY | Performed by: STUDENT IN AN ORGANIZED HEALTH CARE EDUCATION/TRAINING PROGRAM

## 2017-03-08 PROCEDURE — 87106 FUNGI IDENTIFICATION YEAST: CPT | Performed by: EMERGENCY MEDICINE

## 2017-03-08 PROCEDURE — 83605 ASSAY OF LACTIC ACID: CPT | Performed by: EMERGENCY MEDICINE

## 2017-03-08 PROCEDURE — 99285 EMERGENCY DEPT VISIT HI MDM: CPT | Mod: Z6 | Performed by: EMERGENCY MEDICINE

## 2017-03-08 PROCEDURE — 86140 C-REACTIVE PROTEIN: CPT | Performed by: EMERGENCY MEDICINE

## 2017-03-08 PROCEDURE — 25000131 ZZH RX MED GY IP 250 OP 636 PS 637: Mod: GY | Performed by: STUDENT IN AN ORGANIZED HEALTH CARE EDUCATION/TRAINING PROGRAM

## 2017-03-08 PROCEDURE — 84300 ASSAY OF URINE SODIUM: CPT | Performed by: STUDENT IN AN ORGANIZED HEALTH CARE EDUCATION/TRAINING PROGRAM

## 2017-03-08 RX ORDER — PREDNISONE 2.5 MG/1
2.5 TABLET ORAL DAILY
Status: DISCONTINUED | OUTPATIENT
Start: 2017-03-08 | End: 2017-03-15 | Stop reason: HOSPADM

## 2017-03-08 RX ORDER — GABAPENTIN 300 MG/1
900 CAPSULE ORAL 2 TIMES DAILY
Status: DISCONTINUED | OUTPATIENT
Start: 2017-03-08 | End: 2017-03-09

## 2017-03-08 RX ORDER — SIROLIMUS 0.5 MG/1
0.5 TABLET, SUGAR COATED ORAL DAILY
Status: DISCONTINUED | OUTPATIENT
Start: 2017-03-08 | End: 2017-03-08

## 2017-03-08 RX ORDER — DEXTROSE MONOHYDRATE 25 G/50ML
25-50 INJECTION, SOLUTION INTRAVENOUS
Status: DISCONTINUED | OUTPATIENT
Start: 2017-03-08 | End: 2017-03-15 | Stop reason: HOSPADM

## 2017-03-08 RX ORDER — PRAVASTATIN SODIUM 10 MG
10 TABLET ORAL DAILY
Status: DISCONTINUED | OUTPATIENT
Start: 2017-03-08 | End: 2017-03-15 | Stop reason: HOSPADM

## 2017-03-08 RX ORDER — ACETAMINOPHEN 325 MG/1
325-650 TABLET ORAL EVERY 4 HOURS PRN
Status: DISCONTINUED | OUTPATIENT
Start: 2017-03-08 | End: 2017-03-15 | Stop reason: HOSPADM

## 2017-03-08 RX ORDER — DIPHENOXYLATE HCL/ATROPINE 2.5-.025MG
1 TABLET ORAL 4 TIMES DAILY PRN
Status: DISCONTINUED | OUTPATIENT
Start: 2017-03-08 | End: 2017-03-15 | Stop reason: HOSPADM

## 2017-03-08 RX ORDER — FLUCONAZOLE 200 MG/1
200 TABLET ORAL DAILY
Status: DISCONTINUED | OUTPATIENT
Start: 2017-03-08 | End: 2017-03-09

## 2017-03-08 RX ORDER — LOPERAMIDE HCL 2 MG
2 CAPSULE ORAL 3 TIMES DAILY PRN
Status: DISCONTINUED | OUTPATIENT
Start: 2017-03-08 | End: 2017-03-15 | Stop reason: HOSPADM

## 2017-03-08 RX ORDER — NICOTINE POLACRILEX 4 MG
15-30 LOZENGE BUCCAL
Status: DISCONTINUED | OUTPATIENT
Start: 2017-03-08 | End: 2017-03-15 | Stop reason: HOSPADM

## 2017-03-08 RX ORDER — NALOXONE HYDROCHLORIDE 0.4 MG/ML
.1-.4 INJECTION, SOLUTION INTRAMUSCULAR; INTRAVENOUS; SUBCUTANEOUS
Status: DISCONTINUED | OUTPATIENT
Start: 2017-03-08 | End: 2017-03-15 | Stop reason: HOSPADM

## 2017-03-08 RX ORDER — TACROLIMUS 1 MG/1
2 CAPSULE ORAL 2 TIMES DAILY
Status: DISCONTINUED | OUTPATIENT
Start: 2017-03-08 | End: 2017-03-08

## 2017-03-08 RX ORDER — HEPARIN SODIUM 5000 [USP'U]/.5ML
5000 INJECTION, SOLUTION INTRAVENOUS; SUBCUTANEOUS EVERY 12 HOURS
Status: DISCONTINUED | OUTPATIENT
Start: 2017-03-08 | End: 2017-03-11

## 2017-03-08 RX ORDER — SIROLIMUS 0.5 MG/1
1.5 TABLET, FILM COATED ORAL DAILY
COMMUNITY
End: 2017-03-23

## 2017-03-08 RX ORDER — SODIUM CHLORIDE 9 MG/ML
1000 INJECTION, SOLUTION INTRAVENOUS CONTINUOUS
Status: DISCONTINUED | OUTPATIENT
Start: 2017-03-08 | End: 2017-03-09

## 2017-03-08 RX ORDER — FAMOTIDINE 20 MG/1
20 TABLET, FILM COATED ORAL 2 TIMES DAILY
Status: DISCONTINUED | OUTPATIENT
Start: 2017-03-08 | End: 2017-03-10

## 2017-03-08 RX ORDER — CEFTRIAXONE 2 G/1
2 INJECTION, POWDER, FOR SOLUTION INTRAMUSCULAR; INTRAVENOUS EVERY 24 HOURS
Status: DISCONTINUED | OUTPATIENT
Start: 2017-03-08 | End: 2017-03-11

## 2017-03-08 RX ORDER — TACROLIMUS 0.5 MG/1
0.5 CAPSULE ORAL 2 TIMES DAILY
Status: ON HOLD | COMMUNITY
End: 2017-03-08

## 2017-03-08 RX ORDER — LABETALOL 200 MG/1
200 TABLET, FILM COATED ORAL 2 TIMES DAILY
Status: DISCONTINUED | OUTPATIENT
Start: 2017-03-08 | End: 2017-03-15 | Stop reason: HOSPADM

## 2017-03-08 RX ORDER — LEVOTHYROXINE SODIUM 50 UG/1
50 TABLET ORAL DAILY
Status: DISCONTINUED | OUTPATIENT
Start: 2017-03-08 | End: 2017-03-15 | Stop reason: HOSPADM

## 2017-03-08 RX ORDER — CLONIDINE HYDROCHLORIDE 0.1 MG/1
0.1 TABLET ORAL 2 TIMES DAILY
Status: DISCONTINUED | OUTPATIENT
Start: 2017-03-08 | End: 2017-03-15 | Stop reason: HOSPADM

## 2017-03-08 RX ADMIN — LEVOTHYROXINE SODIUM 50 MCG: 50 TABLET ORAL at 20:13

## 2017-03-08 RX ADMIN — HEPARIN SODIUM 5000 UNITS: 5000 INJECTION, SOLUTION INTRAVENOUS; SUBCUTANEOUS at 19:47

## 2017-03-08 RX ADMIN — PRAVASTATIN SODIUM 10 MG: 10 TABLET ORAL at 20:13

## 2017-03-08 RX ADMIN — FAMOTIDINE 20 MG: 20 TABLET ORAL at 21:12

## 2017-03-08 RX ADMIN — INSULIN GLARGINE 10 UNITS: 100 INJECTION, SOLUTION SUBCUTANEOUS at 22:23

## 2017-03-08 RX ADMIN — FLUCONAZOLE 200 MG: 200 TABLET ORAL at 21:11

## 2017-03-08 RX ADMIN — SODIUM CHLORIDE 1000 ML: 9 INJECTION, SOLUTION INTRAVENOUS at 20:57

## 2017-03-08 RX ADMIN — SODIUM CHLORIDE 1000 ML: 9 INJECTION, SOLUTION INTRAVENOUS at 13:53

## 2017-03-08 RX ADMIN — SODIUM CHLORIDE 1000 ML: 9 INJECTION, SOLUTION INTRAVENOUS at 12:49

## 2017-03-08 RX ADMIN — PREDNISONE 2.5 MG: 2.5 TABLET ORAL at 19:47

## 2017-03-08 RX ADMIN — CEFTRIAXONE 2 G: 2 INJECTION, POWDER, FOR SOLUTION INTRAMUSCULAR; INTRAVENOUS at 20:08

## 2017-03-08 RX ADMIN — GABAPENTIN 900 MG: 300 CAPSULE ORAL at 19:47

## 2017-03-08 RX ADMIN — CLONIDINE HYDROCHLORIDE 0.1 MG: 0.1 TABLET ORAL at 19:48

## 2017-03-08 RX ADMIN — LABETALOL HCL 200 MG: 200 TABLET, FILM COATED ORAL at 19:51

## 2017-03-08 ASSESSMENT — ENCOUNTER SYMPTOMS
HEADACHES: 0
CONFUSION: 1
ABDOMINAL PAIN: 0
COLOR CHANGE: 0
ARTHRALGIAS: 0
DIFFICULTY URINATING: 0
FEVER: 0
EYE REDNESS: 0
DIARRHEA: 1
SHORTNESS OF BREATH: 0
NECK STIFFNESS: 0

## 2017-03-08 NOTE — ED NOTES
Bed: ED03  Expected date: 3/8/17  Expected time: 11:59 AM  Means of arrival: Ambulance  Comments:  A514  71 female  Known infection  yellow

## 2017-03-08 NOTE — IP AVS SNAPSHOT
` ` Patient Information     Patient Name Sex     Gem Jade ([id number]) Female 1945       Room Bed    5216 5216-01      Patient Demographics     Address Phone    [address] [place] [address] [phone] (Home) *Preferred*  [phone] (Mobile)      Patient Ethnicity & Race     Ethnic Group Patient Race    Tunisian       Emergency Contact(s)     Name Relation Home Work Mobile    Chang Jade Spouse [phone] none [phone]    WorkJessica babin Daughter [phone] none [phone]      Documents on File        Status Date Received Description       Documents for the Patient    Face Sheet  () 02     Insurance Card Received 12     Privacy Notice - [place] Received 12     Face Sheet Received () 09     Insurance Card  10/02/03     Insurance Card Received 12     Insurance Card  02/10/06     Waiver - Payment  07     Waiver - Payment  09     External Medication Information Consent Accepted () 09     Face Sheet Received () 05/24/10     Patient ID       Consent for Services - Hospital/Clinic Received () 11/09/10     External Medication Information Consent Accepted () 11/09/10     HIM ERASMO Authorization - File Only   release from patient 11    HIM ERASMO Authorization   [facility]     Face Sheet Received () 11     Consent for Services - Hospital/Clinic Received () 11     CMS IM for Patient Signature Received 10/12/12     Advance Directives and Living Will Not Received  POLST: PROVIDER ORDERS FOR LIFE SUSTAINING TREATMENT    Advance Directives and Living Will Not Received  DIRECTIVES TO DEFINE SCOPE OF MEDICAL CARE-2011(NOT VERY LEGIBLE)    Advance Directives and Living Will Not Received  DIRECTIVES TO DEFINE SCOPE OF MEDICAL CARE-2011    Privacy Notice - [place] Received 12     Advance Directives and Living Will Received 12  DIRECTIVES TO DEFINE SCOPE OF MEDICAL CARE-2011    Advance Directives and Living Will Received 12 DIRECTIVES TO DEFINE SCOPE OF MEDICAL CARE-2011    Privacy Notice - [place]  12 ACKNOWLEDGMENT OF RECEIPT OF NOTICE OF PRIVACY PRACTICE    Privacy Notice - [place]  12 ACKNOWLEDGMENT OF RECEIPT OF NOTICE OF PRIVACY PRACTICE    HIM ERASMO Authorization  12     Advance Directives and Living Will Received 12 DIRECTIVE TO DEFINE SCOPE OF MEDICAL CARE1/3/2011    [facility] ERASMO Authorization  12 [facility] dated 2012    Business/Insurance/Care Coordination/Health Form - Patient.2   [facility] Plan of Care 10/04/12    Advance Directives and Living Will Received 10/30/12 DIRECTIVE TO DEFINE SCOPE OF MEDICAL CARE    Consent for EHR Access  13 Copied from existing Consent for services - C/HOD collected on 2010    [facility] Specified Other       HIM ERASMO Authorization - File Only   13 ERASMO [facility]    HIM ERASMO Authorization - File Only   13 ERASMO [facility]    [facility] ERASMO Authorization - File Only   13 [facility]ASMO [facility] ERASMO Authorization - File Only   13 ERASMO [facility]    Consent for Services - Hospital/Clinic Received () 04/10/13     Privacy Notice - [place]       [facility] ERASMO Authorization - File Only   ERASMO [facility]    Consent for Services - Hospital/Clinic  () 14 CONSENT FOR SERVICE    HIM ERASMO Authorization  10/06/14 [employer]    HIM ERASMO Authorization - File Only   [employer] 2/19/15    HIM ERASMO Authorization  04/09/15 [employer]    Consent for Services - Hospital/Clinic Received () 08/24/15     HIM ERASMO Authorization  12/28/15 [employer]    Consent for Services/Privacy Notice - Hospital/Clinic Received 16     Consent for Services/Privacy Notice - Hospital/Clinic  16 CONSENT FOR SERVICE     Business/Insurance/Care Coordination/Health Form - Patient  08/30/16 [facility]  08/16/2016       Documents for the Encounter    CMS IM for Patient Signature Received 03/08/17     Photograph   WOC 3/9/17      Admission Information     Attending Provider Admitting Provider Admission Type Admission Date/Time    Radha Boyce MD Kirsch, Wang Downing MD Emergency 03/08/17  1206    Discharge Date Hospital Service Auth/Cert Status Service Area     Internal Medicine Incomplete [facility]    Unit Room/Bed Admission Status       UU U5A 5216/5216-01 Admission (Confirmed)       Admission     Complaint    ROB (acute kidney injury) (H)      Hospital Account     Name Acct ID Class Status Primary Coverage    Gem Jade [id number] Inpatient Open MEDICARE - MEDICARE FOR HB SUPPLEMENT            Guarantor Account (for Hospital Account #[id number])     Name Relation to Pt Service Area Active? Acct Type    Gem Jade Self FCS Yes Personal/Family    Address Phone          [address] [place] [address] [phone](H)              Coverage Information (for Hospital Account #[id number])     1. MEDICARE/MEDICARE FOR HB SUPPLEMENT     F/O Payor/Plan Precert #    MEDICARE/MEDICARE FOR HB SUPPLEMENT     Subscriber Subscriber #    Gem Jade [id number]    Address Phone    [employer] CLAIM[employer]  [address]  [place] [address] [phone]          2. MEDICA/MEDICA PRIME SOLUTION     F/O Payor/Plan Precert #    MEDICA/MEDICA PRIME SOLUTION     Subscriber Subscriber #    Gem Jade [id number]    Address Phone    [address] [address]  [place] [address] [phone]

## 2017-03-08 NOTE — H&P
[facility] History and Physical    Gem Jade MRN# [id number]   Age: 71 year old YOB: 1945     Date of Admission: 3/8/2017    Primary care provider: Marivel Barcenas          Chief Complaint:   Acute Kidney Injury         History of Present Illness:   Gem Jade is a 71 year old male with a hx of kidney transplant on sirolimus/tacrolimus/prednisone and chronic indwelling rico with recurrent complicated UTIs, morbid obesity, severe vertigo, generalized weakness, chronic pain, bed bound  Hospital (2/16/17-2/20/17) due to altered mental status with hydronephrosis. Treated initially with Ertapenem and discharged on Cipro 500 mg BID to finish on 3/2.     Emergency Department via EMS for evaluation of continued confusion. She was seen by a provider on 3/1 where her altered mental state was still present. And she is currently reporting seeing things that are not there, which is what she says happens when she get really sick.  Intermittent diarrhea but has become more frequent.  Urine culture grew candida species.  Also reports some chills that have been present on and off for several weeks.  Good PO nutrition and fluid intake.  Denies vomiting, nausea, fever, sweats, abdominal pain.          Past Medical History:     Past Medical History   Diagnosis Date     Background diabetic retinopathy(362.01)      extensive diabetic retinopathy, s/p multiple laser treatments     Diabetic gastroparesis (H)      followed by [facility] (Dr. Chen)     Diarrhea      Dizziness and giddiness      Hyponatremia 12/16/11     Na 121     Kidney replaced by transplant      Mixed hyperlipidemia      Obesity      Osteopenia 11/07     per DEXA     Other and unspecified hyperlipidemia      Other pulmonary embolism and infarction 3/03     Bilateral pulmonary emboli post op after knee replacement     Polyneuropathy in diabetes(357.2)      Primary localized osteoarthrosis, lower leg      Pyelonephritis,  unspecified      Type 2 diabetes mellitus with complications (H)      Urinary tract infection, site not specified      Chronic UTIs     UTI (urinary tract infection) due to urinary indwelling catheter (H)              Past Surgical History:      Past Surgical History   Procedure Laterality Date     C nonspecific procedure  10/99     kidney transplant     C nonspecific procedure  8/00     MRI head, lumbar spine, pelvis     Hc left heart catheterization  1999     Cardiac Cath, Left Heart, Negative  (@ [facility])     C total knee arthroplasty  3/03     Knee Replacement, Total right, post op PE     Hc remv cataract extracap,insert lens  8/04     bilateral cataract repaired, left eye      Hc left heart catheterization  4/05     normal coronary angiogram at [facility], had mild troponin rise (0.71)     Cholecystectomy               Social History:     Social History   Substance Use Topics     Smoking status: Former Smoker     Years: 17.00     Quit date: 6/1/1987     Smokeless tobacco: Not on file     Alcohol use No             Family History:     Family History   Problem Relation Age of Onset     DIABETES Mother      DIABETES Brother      Hypertension Father      [id number] pneumonia     HEART DISEASE Father              Allergies:     Allergies   Allergen Reactions     Bactrim Ds [Sulfamethoxazole W/Trimethoprim]      Creatinine level increased     Atorvastatin Calcium      myalgias, muscle aches     Codeine Nausea and Vomiting     Darvocet [Propoxyphene N-Apap] Nausea and Vomiting     Hydromorphone      Levofloxacin Diarrhea     Morphine      Nausea and vomiting     Niaspan [Niacin] GI Disturbance     Flushing even at low dose, GI upset     Percocet [Oxycodone-Acetaminophen]      Vomiting after taking med couple of times     Vicodin [Hydrocodone-Acetaminophen] Nausea and Vomiting             Medications:     Prescriptions Prior to Admission   Medication Sig Dispense Refill Last Dose     diphenoxylate-atropine (LOMOTIL) 2.5-0.025  "MG per tablet Take 1 tablet by mouth 4 times daily as needed for diarrhea 50 tablet 3      TraMADol HCl 50 MG TBDP Take 50 mg by mouth 3 times daily as needed 90 tablet 3      blood glucose monitoring (ACCU-CHEK COMPACT PLUS) test strip Use to test blood sugar 3 times daily or as directed. 100 strip 11      blood glucose monitoring (ACCU-CHEK COMPACT CARE KIT) meter device kit Use to test blood sugar 3 times daily or as directed. 1 kit 0      insulin aspart (NOVOLOG FLEXPEN) 100 UNIT/ML injection Inject 7 units before meals BID. At bedtime use following sliding scale:   150-200 5u  201-250 7u  251-300 9u  301-350 11u  351+ 13u 15 mL 0      OXcarbazepine (TRILEPTAL) 300 MG tablet Take 1 tablet (300 mg) by mouth 2 times daily 180 tablet 3      levothyroxine (SYNTHROID) 50 MCG tablet Take 1 tablet (50 mcg) by mouth daily 90 tablet 3      cloNIDine (CATAPRES) 0.1 MG tablet Take 1 tablet (0.1 mg) by mouth 2 times daily 180 tablet 3      gabapentin (NEURONTIN) 300 MG capsule Take 3 tabs 2x daily 540 capsule 3      RAPAMUNE 1 MG PO TABLET Take 1 tablet (1 mg) by mouth daily GENERIC (total dose 1.5 mg daily) 30 tablet 11      tacrolimus (PROGRAF - GENERIC EQUIVALENT) 0.5 MG capsule HOLD 60 capsule 6      tacrolimus (PROGRAF - GENERIC EQUIVALENT) 1 MG capsule Take 2 capsules (2 mg) by mouth 2 times daily 120 capsule 6      RAPAMUNE 0.5 MG PO TABLET Take 1 tablet (0.5 mg) by mouth daily GENERIC (1.5 mg daily) 30 tablet 3      insulin glargine (LANTUS SOLOSTAR) 100 UNIT/ML PEN Inject 14 units under the skin daily. 15 mL 3      labetalol (NORMODYNE) 200 MG tablet Take 1 tablet (200 mg) by mouth 2 times daily 180 tablet 3      cyanocobalamin (VITAMIN B12) 1000 MCG/ML injection Inject 1 mL (1,000 mcg) into the muscle every 30 days 3 mL 3      syringe/needle, disp, (B-D SYRINGE/NEEDLE) 22G X 1-1/2\" 3 ML MISC Inject 1 Syringe into the muscle every 30 days 50 each 0      order for DME Equipment being ordered: Ahandyhand FX CPAP interface " (nasal pillows), size XS. 1 each 1      glucagon (GLUCAGON EMERGENCY) 1 MG injection Inject 1 mg into the muscle once 1 each 11 More than a month at Unknown time     clotrimazole (LOTRIMIN) 1 % cream Apply topically 2 times daily Apply to buttocks twice a day until rash resolved. 40 g 0 More than a month at Unknown time     loratadine (CLARITIN) 10 MG tablet Take 1 tablet (10 mg) by mouth daily 90 tablet 3 8/29/2016 at Unknown time     insulin pen needle 30G X 8 MM Use as directed with Lantus 100 each 3 8/29/2016 at Unknown time     pravastatin (PRAVACHOL) 10 MG tablet Take 1 tablet (10 mg) by mouth daily 90 tablet 3 8/29/2016 at Unknown time     ondansetron (ZOFRAN ODT) 4 MG disintegrating tablet Take 1-2 tablets (4-8 mg) by mouth every 8 hours as needed for nausea 20 tablet 5 Past Month at Unknown time     simethicone (MYLICON) 125 MG CHEW Take 1 tablet (125 mg) by mouth as needed for intestinal gas 120 tablet  More than a month at Unknown time     loperamide (IMODIUM) 2 MG capsule Take 1 capsule (2 mg) by mouth 3 times daily as needed for diarrhea 60 capsule 0 More than a month at Unknown time     Lactobacillus Rhamnosus, GG, (CULTURELL) capsule Take 1 capsule by mouth 2 times daily 60 capsule 11 8/29/2016 at Unknown time     predniSONE (DELTASONE) 2.5 MG tablet Take 1 tablet (2.5 mg) by mouth daily 30 tablet 11 8/29/2016 at Unknown time     Nystatin (NYSTOP) 329805 UNIT/GM POWD Externally apply 1 g topically 3 times daily as needed 60 g 6 More than a month at Unknown time     order for DME Equipment being ordered: Blue Chux  Please send to [facility] Rkylin 100 pad 3 Unknown at Unknown time     alcohol swab prep pads Use to swab area of injection/yvon and vials as directed. 1 Box 12 8/6/2016 at Unknown time     Cranberry 400 MG TABS Take 400 mg by mouth 2 times daily   8/29/2016 at Unknown time     benzonatate (TESSALON) 100 MG capsule Take 1 capsule (100 mg) by mouth 3 times daily as needed for cough 50 capsule 0  Past Month at Unknown time     phenazopyridine (PYRIDIUM) 100 MG tablet Take 1 tablet (100 mg) by mouth 3 times daily as needed for irritation 6 tablet 3 More than a month at Unknown time     famotidine (PEPCID) 20 MG tablet Take 1 tablet (20 mg) by mouth 2 times daily 60 tablet 1 8/29/2016 at Unknown time     ORDER FOR DME Equipment being ordered: Johnson Catheters - 18 french 3 catheter PRN Past Week at Unknown time     ACE NOT PRESCRIBED, INTENTIONAL, 1 each continuous prn ACE Inhibitor not prescribed due to Worsening renal function on ACE/ARB therapy  0 Unknown at Unknown time     ASPIRIN PO Take 81 mg by mouth daily.     8/29/2016 at Unknown time     ACETAMINOPHEN PO Take 325-650 mg by mouth every 4 hours as needed.   Past Month at Unknown time            Physical Exam:   Vitals were reviewed  Blood pressure 118/63, pulse 87, temperature 97.4  F (36.3  C), temperature source Oral, resp. rate 16, SpO2 98 %, not currently breastfeeding.    General: NAD, fatigued  HEENT: Oral mucosa moist and non-erythematous, PERRL, EOM intact  CV: RRR, no murmur, clicks, rubs  Resp: Clear to auscultation bilaterally, no wheezes or crackles  Abd: Soft, non-tender, BS+, no masses appreciated  Extremities: Radial and pedal pulses intact and symmetric, no pedal edema  Neuro: AAOx3, no lateralizing symptoms or focal neurologic deficits     Data:     Labs:  135   98    42   /  -----------------------  61  4.6    28    3.37 \          \  10.9   /  5.0    -------    137    Lactate: 0.6  Urine culture: pending  Blood cultures: pending  C. Diff Toxin PCR: Negative  Low albumin     Urinalysis: 3/8 (3/3)  Protein albumin: 300 (100)  Specific gravity: 1.015 (1.006)  Nitrite: negative (Negative)  Blood: moderate (Small)  Leukocyte esterase: Large (Large)  WBC: >182 (43)  RBC: 154 (46)  Bacteria: moderate   WBC Clumps: present  Yeast: Many    Imaging:   EXAMINATION:  RENAL TRANSPLANT, 3/8/2017 3:12 PM   COMPARISON:  2/16/2017  IMPRESSION:  1. Mildly increased resistive indices in arcuate arteries, slightly  decreased from previous study. Patent Doppler examination of the  transplant vasculature. The increased RI's are of concern for  rejection.  2. Resolution of hydronephrosis.         Assessment and Plan:   Gem Jade is a 71 year old male with a hx of kidney transplant on sirolimus/tacrolimus/prednisone and chronic indwelling rico with recurrent complicated UTIs admitted for ROB.    # Acute Kidney Injury:  Baseline creatinine elevated to 3.49 (baseline of 0.8-1.1) Urine analysis worse. No medication changes.  Likely due to UTI with limited concerns for obstruction with resolution of hydronephrosis on US.  - Trend BMP  - Daily weights  - Strict I/Os   - Continue prednisone/sirolimus  - Hold Tac  - Tacrolimus level   - Holding Asprin    # Complicated Urinary Tract Infections:  Patient with hx of infection with ESBL E. Coli and multiple infections with Klebsiella and well as candida, concerning for both multidrug resistent Klebsiella (as pt has had multiple antibiotic exposures) and ESBL. Was on keflex outpatient, started on cefepime in ED.   Was treated with Cipro 500 mg BID which ended 3/2.   - Neuro checks  - Ceftriaxone 2g  - Fluconazole 200 mg    Yeast infection:  Thought to be contaminate last admission, but will treat more aggressively this admission.  - Continue topical Nystatin  - Start Fluconazole     Chronic:  # Hypertension, Hyperlipidemia:   - Continue home clonidine  - Continue Labetalol  - Pravastatin   # Pain:   - ContiuneAcetaminophen PRN  - Continue gabapentin, decrease dose to 300 mg BID  -  Hold Tramadol   # Diarrhea:   - Continue Lomotil  - Continue Imodium PRN  # Hypothyroid  - Continue Synthroid  # Diabetes:   - Continue Lantus, home dose 14 units, ordered 10 units  - Medium SSI    FEN: Renal diet  Prophylaxis: Pepcid, SubQ heparin   Code Status: Full code  Disposition: Pending workup    Patient seen  and discussed with Dr. Zaina Turk MD, [facility]  Internal Medicine PGY-1  Pager: [phone]    Internal Medicine Staff Addendum  Date of Service: 3/8/2017    I have seen and examined this patient, reviewed the data and discussed the plan of care. I agree with the above documentation including plan and ddx unless otherwise stated:     Narinder Mahajan MD  Internal Medicine [facility]  Attending pager: [phone]

## 2017-03-08 NOTE — IP AVS SNAPSHOT
MRN:[id number]                      After Visit Summary   3/8/2017    Gem Jade    MRN: [id number]           Thank you!     Thank you for choosing [facility] for your care. Our goal is always to provide you with excellent care. Hearing back from our patients is one way we can continue to improve our services. Please take a few minutes to complete the written survey that you may receive in the mail after you visit with us. Thank you!        Patient Information     Date Of Birth          1945        About your hospital stay     You were admitted on:  March 8, 2017 You last received care in the:  [address] [facility]    You were discharged on:  March 15, 2017        Reason for your hospital stay       You were in the hospital for a urinary tract infection and acute kidney injury.                  Who to Call     For medical emergencies, please call 911.  For non-urgent questions about your medical care, please call your primary care provider or clinic, [phone]          Attending Provider     Provider Specialty    Timi Paz MD Emergency Medicine    Jurgen Arrieta MD Internal Medicine    Zaina, Wang Downing MD Internal Medicine    , MD Roland Internal Medicine    Austen, Radha Renee MD Internal Medicine       Primary Care Provider Office Phone # Fax #    Marivel ANTHONY [facility] [phone]        [facility] [address] [place] [id number]        After Care Instructions     Activity       Your activity upon discharge: activity as tolerated            Diet       Follow this diet upon discharge: Renal Diet (non-dialysis)            Supplies       - Post-insertion Rico care wipes  - Meriplex dressings for sacral ulcer            Tubes and drains       You are going home with the following tubes or drains: rico catheter.  Please change rico catheter every three weeks. Utilize post-insertion Rico care wipes as instructed.                   Follow-up Appointments     Adult [facility] Follow-up and recommended labs and tests       - Follow-up with Urology service for continued evaluation of your bladder and in-dwelling catheter. Call to schedule one week after discharge ([phone])  - Follow-up with your nurse practitioner, Marivel Barcenas, on 4/5/17 as scheduled for continued evaluation.            Follow Up and recommended labs and tests       - BMP, CBC with differential, and liver function panel after completion of Amoxicillin / Fluconazole regimen (3/22/17)                  Your next 10 appointments already scheduled     Mar 21, 2017  2:00 PM CDT   Office Visit with Conchita [facility] ([facility])    [address] [place] [address]  [address] [place] [address] [phone]           Bring a current list of meds and any records pertaining to this visit.  For Physicals, please bring immunization records and any forms needing to be filled out.  Please arrive 10 minutes early to complete paperwork.            Apr 05, 2017  2:00 PM CDT   Return Visit with ANTHONY Antonio CNP   [facility] ([facility])    [address]   [address] [place] [address] [phone]            May 03, 2017  2:00 PM CDT   Return Visit with ANTHONY Antonio CNP   [facility] ([facility])    [address]  [address] [place] [address] [phone]            Jun 07, 2017  2:00 PM CDT   Return Visit with ANTHONY Antonio CNP   [facility] ([facility])    [address]  [address]  [facility] [address] [phone]              Additional Services     Home care nursing referral       Resume Home Care Services:  _______________________  [facility]  Phone  [phone]  Fax   [phone]  ______________________    Resume previous RN and HHA orders.    Your provider has ordered home care nursing services. If you have not been contacted within 2 days of your discharge please call the inpatient department phone number at [phone] .            NEUROPSYCHOLOGY REFERRAL       Your provider has referred you to:    [facility]: Adult Neuropsychology Clinic (Mental Health Services) - [place] [phone]   [email or web address]    All scheduling is subject to the client's specific insurance plan & benefits, provider/location availability, and provider clinical specialities.  Please arrive 15 minutes early for your first appointment and bring your completed paperwork.    Please be aware that coverage of these services is subject to the terms and limitations of your health insurance plan.  Call member services at your health plan with any benefit or coverage questions.    Please bring the following to your appointment:  >>   Any x-rays, CTs or MRIs which have been performed.  Contact the facility where they were done to arrange for  prior to your scheduled appointment.  Any new CT, MRI or other procedures ordered by your specialist must be performed at a [facility] facility or coordinated by your clinic's referral office.    >>   List of current medications   >>   This referral request   >>   Any documents/labs given to you for this referral            Urology Adult Referral                 Pending Results     Date and Time Order Name Status Description    3/15/2017 0731 Sirolimus level In process     3/15/2017 0003 Tacrolimus level In process     3/11/2017 0632 1,25 Dihydroxyvitamin D In process             Statement of Approval     Ordered          03/15/17 0932  I have reviewed and agree with all the recommendations and orders detailed in this document.  EFFECTIVE NOW     Approved and electronically signed by:  Alexi Richard MD            "  Admission Information     Date & Time Provider Department Dept. Phone    3/8/2017 Radha Boyce MD [address] [facility] [phone]      Your Vitals Were     Blood Pressure Pulse Temperature Respirations Height Weight    134/59 (BP Location: Left arm) 66 96.5  F (35.8  C) (Oral) 18 1.702 m (5' 7\") 91.2 kg (201 lb)    Pulse Oximetry BMI (Body Mass Index)                99% 31.48 kg/m2          MyChart Information     [employer] lets you send messages to your doctor, view your test results, renew your prescriptions, schedule appointments and more. To sign up, go to [email or web address] . Click on \"Log in\" on the left side of the screen, which will take you to the Welcome page. Then click on \"Sign up Now\" on the right side of the page.     You will be asked to enter the access code listed below, as well as some personal information. Please follow the directions to create your username and password.     Your access code is: [email or web address]  Expires: 2017  9:50 AM     Your access code will  in 90 days. If you need help or a new code, please call your [facility] clinic or [phone].        Care EveryWhere ID     This is your Care EveryWhere ID. 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 If we are uncertain of the size of tablets/capsules you have at home, strength may be listed as something that might have changed.        Dose / Directions    gabapentin 300 MG capsule   Commonly known as:  NEURONTIN   This may have changed:    - how much to take  - how to take this  - when to take this  - additional instructions   Used for:  Diabetic polyneuropathy associated with diabetes mellitus due to underlying condition (H)        Dose:  300 mg   Take 1 capsule (300 mg) by mouth At Bedtime   Refills:  0       tacrolimus 1 MG capsule   Commonly known as:  PROGRAF - GENERIC EQUIVALENT   This may have changed:  how much to take   Used for:  Kidney transplant recipient        Dose:  1 mg   Take 1 capsule (1 mg) by mouth 2 times daily   Quantity:  120 capsule   Refills:  6       TraMADol HCl 50 MG Tbdp   Indication:  Moderate to Moderately Severe Pain   This may have changed:  when to take this   Used for:  Chronic shoulder pain, unspecified laterality        Dose:  50 mg   Take 50 mg by mouth 3 times daily as needed   Quantity:  90 tablet   Refills:  3         CONTINUE these medicines which have NOT CHANGED        Dose / Directions    * ACE NOT PRESCRIBED (INTENTIONAL)   Used for:  Type 2 diabetes, HbA1C goal < 8% (H)        Dose:  1 each   1 each continuous prn ACE Inhibitor not prescribed due to Worsening renal function on ACE/ARB therapy   Refills:  0       ACETAMINOPHEN PO        Dose:  325-650 mg   Take 325-650 mg by mouth every 4 hours as needed.   Refills:  0       alcohol swab prep pads   Used for:  Type 2 diabetes, HbA1C goal < 8% (H)        Use to swab area of injection/yvon and vials as directed.   Quantity:  1 Box   Refills:  12       ASPIRIN PO        Dose:  81 mg    Take 81 mg by mouth daily.   Refills:  0       blood glucose monitoring meter device kit   Used for:  Type 2 diabetes mellitus with diabetic polyneuropathy, with long-term current use of insulin (H)        Use to test blood sugar 3 times daily or as directed.   Quantity:  1 kit   Refills:  0       blood glucose monitoring test strip   Commonly known as:  ACCU-CHEK COMPACT PLUS   Used for:  Type 2 diabetes mellitus with diabetic polyneuropathy, with long-term current use of insulin (H)        Use to test blood sugar 3 times daily or as directed.   Quantity:  100 strip   Refills:  11       cloNIDine 0.1 MG tablet   Commonly known as:  CATAPRES   Used for:  HTN, goal below 140/90        Dose:  0.1 mg   Take 1 tablet (0.1 mg) by mouth 2 times daily   Quantity:  180 tablet   Refills:  3       clotrimazole 1 % cream   Commonly known as:  LOTRIMIN        Apply topically 2 times daily Apply to buttocks twice a day until rash resolved.   Quantity:  40 g   Refills:  0       Cranberry 400 MG Tabs        Dose:  400 mg   Take 400 mg by mouth 2 times daily   Refills:  0       cyanocobalamin 1000 MCG/ML injection   Commonly known as:  VITAMIN B12   Used for:  Iron deficiency anemia, unspecified iron deficiency anemia type        Dose:  1000 mcg   Inject 1 mL (1,000 mcg) into the muscle every 30 days   Quantity:  3 mL   Refills:  3       diphenoxylate-atropine 2.5-0.025 MG per tablet   Commonly known as:  LOMOTIL   Used for:  Diarrhea of presumed infectious origin        Dose:  1 tablet   Take 1 tablet by mouth 4 times daily as needed for diarrhea   Quantity:  50 tablet   Refills:  3       famotidine 20 MG tablet   Commonly known as:  PEPCID        Dose:  20 mg   Take 1 tablet (20 mg) by mouth 2 times daily   Quantity:  60 tablet   Refills:  1       GLUCAGON EMERGENCY 1 MG kit   Used for:  Hypoglycemia   Generic drug:  glucagon        Dose:  1 mg   Inject 1 mg into the muscle once   Quantity:  1 each   Refills:  11       insulin  glargine 100 UNIT/ML injection   Commonly known as:  LANTUS SOLOSTAR   Used for:  Diabetes mellitus with background retinopathy (H)        Inject 14 units under the skin daily.   Quantity:  15 mL   Refills:  3       insulin pen needle 30G X 8 MM   Used for:  Type 2 diabetes mellitus with diabetic nephropathy (H)        Use as directed with Lantus   Quantity:  100 each   Refills:  3       labetalol 200 MG tablet   Commonly known as:  NORMODYNE   Used for:  Kidney replaced by transplant, Benign essential hypertension        Dose:  200 mg   Take 1 tablet (200 mg) by mouth 2 times daily   Quantity:  180 tablet   Refills:  3       lactobacillus rhamnosus (GG) capsule   Used for:  Diarrhea        Dose:  1 capsule   Take 1 capsule by mouth 2 times daily   Quantity:  60 capsule   Refills:  11       levothyroxine 50 MCG tablet   Commonly known as:  SYNTHROID   Used for:  Other specified hypothyroidism        Dose:  50 mcg   Take 1 tablet (50 mcg) by mouth daily   Quantity:  90 tablet   Refills:  3       loratadine 10 MG tablet   Commonly known as:  CLARITIN   Used for:  Acute nasopharyngitis        Dose:  10 mg   Take 1 tablet (10 mg) by mouth daily   Quantity:  90 tablet   Refills:  3       NovoLOG FLEXPEN 100 UNIT/ML injection   Used for:  Diabetes mellitus with background retinopathy (H)   Generic drug:  insulin aspart        Inject 7 units before meals BID. At bedtime use following sliding scale:  150-200 5u 201-250 7u 251-300 9u 301-350 11u 351+ 13u   Quantity:  15 mL   Refills:  0       NYSTOP 950573 UNIT/GM Powd powder   Used for:  Fungal infection        Dose:  1 g   Externally apply 1 g topically 3 times daily as needed   Quantity:  60 g   Refills:  6       ondansetron 4 MG ODT tab   Commonly known as:  ZOFRAN ODT   Used for:  Nausea        Dose:  4-8 mg   Take 1-2 tablets (4-8 mg) by mouth every 8 hours as needed for nausea   Quantity:  20 tablet   Refills:  5       * order for DME   Used for:  Johnson catheter  "status        Equipment being ordered: Johnson Catheters - 18 french   Quantity:  3 catheter   Refills:  PRN       * order for DME   Used for:  Fecal incontinence        Equipment being ordered: Blue Chux Please send to [facility]   Quantity:  100 pad   Refills:  3       * order for DME   Used for:  Obstructive sleep apnea syndrome        Equipment being ordered: Ahuja FX CPAP interface (nasal pillows), size XS.   Quantity:  1 each   Refills:  1       OXcarbazepine 300 MG tablet   Commonly known as:  TRILEPTAL   Used for:  Diabetic polyneuropathy associated with diabetes mellitus due to underlying condition (H)        Dose:  300 mg   Take 1 tablet (300 mg) by mouth 2 times daily   Quantity:  180 tablet   Refills:  3       phenazopyridine 100 MG tablet   Commonly known as:  PYRIDIUM   Used for:  Recurrent UTI, Johnson catheter status        Dose:  100 mg   Take 1 tablet (100 mg) by mouth 3 times daily as needed for irritation   Quantity:  6 tablet   Refills:  3       pravastatin 10 MG tablet   Commonly known as:  PRAVACHOL   Used for:  Hyperlipidemia LDL goal <100        Dose:  10 mg   Take 1 tablet (10 mg) by mouth daily   Quantity:  90 tablet   Refills:  3       predniSONE 2.5 MG tablet   Commonly known as:  DELTASONE   Used for:  Kidney replaced by transplant        Dose:  2.5 mg   Take 1 tablet (2.5 mg) by mouth daily   Quantity:  30 tablet   Refills:  11       simethicone 125 MG Chew chewable tablet   Commonly known as:  MYLICON        Dose:  125 mg   Take 1 tablet (125 mg) by mouth as needed for intestinal gas   Quantity:  120 tablet   Refills:  0       sirolimus 0.5 MG tablet   Commonly known as:  RAPAMUNE - GENERIC EQUIVALENT        Dose:  1.5 mg   Take 1.5 mg by mouth daily   Refills:  0       syringe/needle (disp) 22G X 1-1/2\" 3 ML Misc   Commonly known as:  B-D SYRINGE/NEEDLE   Used for:  Iron deficiency anemia, unspecified iron deficiency anemia type        Dose:  1 Syringe   Inject 1 Syringe into the " muscle every 30 days   Quantity:  50 each   Refills:  0       * Notice:  This list has 4 medication(s) that are the same as other medications prescribed for you. Read the directions carefully, and ask your doctor or other care provider to review them with you.         Where to get your medicines      These medications were sent to [facility] [address]  [address] [address]     Phone:  [phone]     amoxicillin 500 MG capsule    fluconazole 200 MG tablet    meclizine 12.5 MG tablet         Some of these will need a paper prescription and others can be bought over the counter. Ask your nurse if you have questions.     You don't need a prescription for these medications     gabapentin 300 MG capsule    tacrolimus 1 MG capsule                Protect others around you: Learn how to safely use, store and throw away your medicines at www.disposemymeds.org.             Medication List: This is a list of all your medications and when to take them. Check marks below indicate your daily home schedule. Keep this list as a reference.      Medications           Morning Afternoon Evening Bedtime As Needed    * ACE NOT PRESCRIBED (INTENTIONAL)   1 each continuous prn ACE Inhibitor not prescribed due to Worsening renal function on ACE/ARB therapy                                ACETAMINOPHEN PO   Take 325-650 mg by mouth every 4 hours as needed.   Last time this was given:  650 mg on 3/15/2017  1:47 AM                                alcohol swab prep pads   Use to swab area of injection/yvon and vials as directed.                                amoxicillin 500 MG capsule   Commonly known as:  AMOXIL   Take 1 capsule (500 mg) by mouth every 12 hours for 7 days   Last time this was given:  500 mg on 3/15/2017  9:31 AM                                ASPIRIN PO   Take 81 mg by mouth daily.                                blood glucose monitoring meter device kit   Use to  test blood sugar 3 times daily or as directed.                                blood glucose monitoring test strip   Commonly known as:  ACCU-CHEK COMPACT PLUS   Use to test blood sugar 3 times daily or as directed.                                cloNIDine 0.1 MG tablet   Commonly known as:  CATAPRES   Take 1 tablet (0.1 mg) by mouth 2 times daily   Last time this was given:  0.1 mg on 3/15/2017  9:31 AM                                clotrimazole 1 % cream   Commonly known as:  LOTRIMIN   Apply topically 2 times daily Apply to buttocks twice a day until rash resolved.                                Cranberry 400 MG Tabs   Take 400 mg by mouth 2 times daily                                cyanocobalamin 1000 MCG/ML injection   Commonly known as:  VITAMIN B12   Inject 1 mL (1,000 mcg) into the muscle every 30 days                                diphenoxylate-atropine 2.5-0.025 MG per tablet   Commonly known as:  LOMOTIL   Take 1 tablet by mouth 4 times daily as needed for diarrhea                                famotidine 20 MG tablet   Commonly known as:  PEPCID   Take 1 tablet (20 mg) by mouth 2 times daily   Last time this was given:  20 mg on 3/15/2017  9:31 AM                                fluconazole 200 MG tablet   Commonly known as:  DIFLUCAN   Take 1 tablet (200 mg) by mouth daily for 7 days   Last time this was given:  200 mg on 3/15/2017  9:31 AM                                gabapentin 300 MG capsule   Commonly known as:  NEURONTIN   Take 1 capsule (300 mg) by mouth At Bedtime   Last time this was given:  300 mg on 3/14/2017 10:14 PM                                GLUCAGON EMERGENCY 1 MG kit   Inject 1 mg into the muscle once   Generic drug:  glucagon                                insulin glargine 100 UNIT/ML injection   Commonly known as:  LANTUS SOLOSTAR   Inject 14 units under the skin daily.   Last time this was given:  12 Units on 3/14/2017 10:30 PM                                insulin pen needle  30G X 8 MM   Use as directed with Lantus                                labetalol 200 MG tablet   Commonly known as:  NORMODYNE   Take 1 tablet (200 mg) by mouth 2 times daily   Last time this was given:  200 mg on 3/15/2017  9:32 AM                                lactobacillus rhamnosus (GG) capsule   Take 1 capsule by mouth 2 times daily                                levothyroxine 50 MCG tablet   Commonly known as:  SYNTHROID   Take 1 tablet (50 mcg) by mouth daily   Last time this was given:  50 mcg on 3/15/2017  9:31 AM                                loratadine 10 MG tablet   Commonly known as:  CLARITIN   Take 1 tablet (10 mg) by mouth daily                                meclizine 12.5 MG tablet   Commonly known as:  ANTIVERT   Take 1 tablet (12.5 mg) by mouth 3 times daily   Last time this was given:  12.5 mg on 3/15/2017  9:32 AM                                NovoLOG FLEXPEN 100 UNIT/ML injection   Inject 7 units before meals BID. At bedtime use following sliding scale:  150-200 5u 201-250 7u 251-300 9u 301-350 11u 351+ 13u   Last time this was given:  2 Units on 3/15/2017  9:30 AM   Generic drug:  insulin aspart                                NYSTOP [id number] UNIT/GM Powd powder   Externally apply 1 g topically 3 times daily as needed                                ondansetron 4 MG ODT tab   Commonly known as:  ZOFRAN ODT   Take 1-2 tablets (4-8 mg) by mouth every 8 hours as needed for nausea                                * order for DME   Equipment being ordered: Alex Catheters - 18 french                                * order for DME   Equipment being ordered: Blue Chux Please send to [facility]                                * order for DME   Equipment being ordered: Ahuja FX CPAP interface (nasal pillows), size XS.                                OXcarbazepine 300 MG tablet   Commonly known as:  TRILEPTAL   Take 1 tablet (300 mg) by mouth 2 times daily                                phenazopyridine  "100 MG tablet   Commonly known as:  PYRIDIUM   Take 1 tablet (100 mg) by mouth 3 times daily as needed for irritation                                pravastatin 10 MG tablet   Commonly known as:  PRAVACHOL   Take 1 tablet (10 mg) by mouth daily   Last time this was given:  10 mg on 3/15/2017  9:32 AM                                predniSONE 2.5 MG tablet   Commonly known as:  DELTASONE   Take 1 tablet (2.5 mg) by mouth daily   Last time this was given:  2.5 mg on 3/15/2017  9:32 AM                                simethicone 125 MG Chew chewable tablet   Commonly known as:  MYLICON   Take 1 tablet (125 mg) by mouth as needed for intestinal gas                                sirolimus 0.5 MG tablet   Commonly known as:  RAPAMUNE - GENERIC EQUIVALENT   Take 1.5 mg by mouth daily                                syringe/needle (disp) 22G X 1-1/2\" 3 ML Misc   Commonly known as:  B-D SYRINGE/NEEDLE   Inject 1 Syringe into the muscle every 30 days                                tacrolimus 1 MG capsule   Commonly known as:  PROGRAF - GENERIC EQUIVALENT   Take 1 capsule (1 mg) by mouth 2 times daily   Last time this was given:  1 mg on 3/15/2017  9:31 AM                                TraMADol HCl 50 MG Tbdp   Take 50 mg by mouth 3 times daily as needed                                * Notice:  This list has 4 medication(s) that are the same as other medications prescribed for you. Read the directions carefully, and ask your doctor or other care provider to review them with you.      "

## 2017-03-08 NOTE — TELEPHONE ENCOUNTER
Acute Kidney Insufficiency: creatinine 3.49, up from baseline 0.8-1.1.  Mental Status Changes: hallucinations, confusion.  Fungal urine culture: 50,000-100,000 col/mL Candida species  History of ESBL & VRE UTI's    PLAN: per Dr. Elizabeth  Come to [facility] ED for assessment, treatment and admission.  Patient requires ambulance transportation as she is bedridden    PHONE CALLS:  Discussed plan with patient's  Chang  Instructed him to call 911  Called ED RN with report  Called complex care team regarding plan

## 2017-03-08 NOTE — ED NOTES
Arrived by EMS. Patient was recently hospitalized for a kidney infection in her transplanted kidney. Labs completed this week indicated worsening kidney function. Patient was instructed to come to ED by home health nurse.

## 2017-03-08 NOTE — ED NOTES
Bed: IN02  Expected date:   Expected time:   Means of arrival:   Comments:  Gem Jade  Acute kidney failure  Kidney tx in 1999  Baseline creat <1, now creat 3.49  Mental status changes  Urine culture from last week

## 2017-03-08 NOTE — TELEPHONE ENCOUNTER
The [facility] had called with an FYI that the kidney transplant MD has requested for pt to be admitted with Acute Kidney Failure. Patients baseline creatinine is usually under 1 but it is sitting at 3.49.     They wanted to contact PCP to let her know.     FWD to PCP for FYI

## 2017-03-08 NOTE — ED NOTES
Bed: IN01  Expected date:   Expected time:   Means of arrival:   Comments:  Patient Name:  MRN [id number]  70 y/o F   ESRD, DM, s/p kidney transplant 1999  Base creatinine 0.5-0.7, two days ago Crt 3.5, Ca 11.2  Recent UTI with candida  Unknown arrival mode  Likely will need admit for ROB/ possible UTI  Referred in by: Gera Bustillos (Txransplant nephrology)

## 2017-03-08 NOTE — LETTER
Transition Communication Hand-off for Care Transitions to Next Level of Care Provider    Name: Gem Jade  MRN #: [id number]  Primary Care Provider: Marivel Barcenas NP     Primary Clinic:  [facility] [address] [address]  [facility] [address]     Reason for Hospitalization:  Hypercalcemia [E83.52]  Acute kidney injury (H) [N17.9]  Admit Date/Time: 3/8/2017 12:06 PM  Discharge Date: 3/15/17  Payor Source: Payor: MEDICA / Plan: MEDICA PRIME SOLUTION / Product Type: Indemnity /     Reason for Communication Hand-off Referral: Other Continuity of care    Discharge Plan:       Concern for non-adherence with plan of care:   No  Discharge Needs Assessment:  Needs       Most Recent Value    Equipment Currently Used at Home lift device, wheelchair, other (see comments)    Transportation Available van, wheelchair accessible, family or friend will provide, ambulance          Already enrolled in Tele-monitoring program and name of program:  NA  Follow-up specialty is recommended: yes    Follow-up plan:  Future Appointments  Date Time Provider Department Center   3/21/2017 2:00 PM Conchita [place], [facility]   4/5/2017 2:00 PM Marivel Barcenas APRN CNP [facility]   5/3/2017 2:00 PM Marivel Barcenas APRN CNP [facility]   6/7/2017 2:00 PM Marivel Barcenas APRN CNP [facility]       Any outstanding tests or procedures:        Referrals     Future Labs/Procedures    Home care nursing referral     Comments:    Resume Home Care Services:  _______________________  [facility]  Phone  [phone]  Fax  [phone]  ______________________    Resume previous RN and HHA orders.    Your provider has ordered home care nursing services. If you have not been contacted within 2 days of your discharge please call the inpatient department phone number at [phone] .    NEUROPSYCHOLOGY REFERRAL     Comments:    Your provider has referred you to:    [facility] ([facility]) - [place] [phone]   [email or web address]    All scheduling is subject to the client's specific insurance plan & benefits, provider/location availability, and provider clinical specialities.  Please arrive 15 minutes early for your first appointment and bring your completed paperwork.    Please be aware that coverage of these services is subject to the terms and limitations of your health insurance plan.  Call member services at your health plan with any benefit or coverage questions.    Please bring the following to your appointment:  >>   Any x-rays, CTs or MRIs which have been performed.  Contact the facility where they were done to arrange for  prior to your scheduled appointment.  Any new CT, MRI or other procedures ordered by your specialist must be performed at a [facility] facility or coordinated by your clinic's referral office.    >>   List of current medications   >>   This referral request   >>   Any documents/labs given to you for this referral    Urology Adult Referral           Myrna Biswas  RN Care Coordinator   [phone]    AVS/Discharge Summary is the source of truth; this is a helpful guide for improved communication of patient story

## 2017-03-08 NOTE — IP AVS SNAPSHOT
[address] [address]    Phone:  [phone]                                       After Visit Summary   3/8/2017    Gem Jade    MRN: [id number]           After Visit Summary Signature Page     I have received my discharge instructions, and my questions have been answered. I have discussed any challenges I see with this plan with the nurse or doctor.    ..........................................................................................................................................  Patient/Patient Representative Signature      ..........................................................................................................................................  Patient Representative Print Name and Relationship to Patient    ..................................................               ................................................  Date                                            Time    ..........................................................................................................................................  Reviewed by Signature/Title    ...................................................              ..............................................  Date                                                            Time

## 2017-03-08 NOTE — ED PROVIDER NOTES
History     Chief Complaint   Patient presents with     Abnormal Labs     HPI  Gem Jade is a 71 year old female with a history of kidney transplant on sirolimus/tacrolimus/prednisone and chronic indwelling rico with recurrent complicated UTIs. Hospital (2/16/17-2/20/17) due to altered mental status with hydronephrosis. Treated with Ertapenem IV then changed to Cipro 500 mg BID to finish on 3/2 (6 days ago). She presents to the Emergency Department via EMS for evaluation of abnormal labs. The patient reports for the past 5 weeks she has been experiencing progressively worsening confusion. She was seen by a provider on 3/1 (1 week ago) where her altered mental state was still present. The patient was compliant with her antibiotics and reports intermittent diarrhea but has become more frequent. Today, she received a call from her clinic who informed her her creatinine was elevated at 3.49 from her baseline of 0.8-1.1. Urine culture grew candida species. She was instructed to present to the ED for admission. She denies vomiting, nausea, fever, chills, sweats, abdominal pain or any other concerns or complaints at this time. Of note, the patient lives at home with her .    Past Medical History   Diagnosis Date     Background diabetic retinopathy(362.01)      extensive diabetic retinopathy, s/p multiple laser treatments     Diabetic gastroparesis (H)      followed by [facility] (Dr. 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)      Urinary  tract infection, site not specified      Chronic UTIs     UTI (urinary tract infection) due to urinary indwelling catheter (H)        Past Surgical History   Procedure Laterality Date     C nonspecific procedure  10/99     kidney transplant     C nonspecific procedure  8/00     MRI head, lumbar spine, pelvis     Hc left heart catheterization  1999     Cardiac Cath, Left Heart, Negative  (@ [facility])     C total knee arthroplasty  3/03     Knee Replacement, Total right, post op PE     Hc remv cataract extracap,insert lens  8/04     bilateral cataract repaired, left eye      Hc left heart catheterization  4/05     normal coronary angiogram at [facility], had mild troponin rise (0.71)     Cholecystectomy         Family History   Problem Relation Age of Onset     DIABETES Mother      DIABETES Brother      Hypertension Father      [id number] pneumonia     HEART DISEASE Father        Social History   Substance Use Topics     Smoking status: Former Smoker     Years: 17.00     Quit date: 6/1/1987     Smokeless tobacco: Not on file     Alcohol use No       Current Facility-Administered Medications   Medication     0.9% sodium chloride infusion     Current Outpatient Prescriptions   Medication     diphenoxylate-atropine (LOMOTIL) 2.5-0.025 MG per tablet     TraMADol HCl 50 MG TBDP     blood glucose monitoring (ACCU-CHEK COMPACT PLUS) test strip     blood glucose monitoring (ACCU-CHEK COMPACT CARE KIT) meter device kit     insulin aspart (NOVOLOG FLEXPEN) 100 UNIT/ML injection     OXcarbazepine (TRILEPTAL) 300 MG tablet     levothyroxine (SYNTHROID) 50 MCG tablet     cloNIDine (CATAPRES) 0.1 MG tablet     gabapentin (NEURONTIN) 300 MG capsule     RAPAMUNE 1 MG PO TABLET     tacrolimus (PROGRAF - GENERIC EQUIVALENT) 0.5 MG capsule     tacrolimus (PROGRAF - GENERIC EQUIVALENT) 1 MG capsule     RAPAMUNE 0.5 MG PO TABLET     insulin glargine (LANTUS SOLOSTAR) 100 UNIT/ML PEN     labetalol (NORMODYNE) 200 MG tablet     cyanocobalamin  "(VITAMIN B12) 1000 MCG/ML injection     syringe/needle, disp, (B-D SYRINGE/NEEDLE) 22G X 1-1/2\" 3 ML MISC     order for DME     glucagon (GLUCAGON EMERGENCY) 1 MG injection     clotrimazole (LOTRIMIN) 1 % cream     loratadine (CLARITIN) 10 MG tablet     insulin pen needle 30G X 8 MM     pravastatin (PRAVACHOL) 10 MG tablet     ondansetron (ZOFRAN ODT) 4 MG disintegrating tablet     simethicone (MYLICON) 125 MG CHEW     loperamide (IMODIUM) 2 MG capsule     Lactobacillus Rhamnosus, GG, (CULTURELL) capsule     predniSONE (DELTASONE) 2.5 MG tablet     Nystatin (NYSTOP) 700945 UNIT/GM POWD     order for DME     alcohol swab prep pads     Cranberry 400 MG TABS     benzonatate (TESSALON) 100 MG capsule     phenazopyridine (PYRIDIUM) 100 MG tablet     famotidine (PEPCID) 20 MG tablet     ORDER FOR DME     ACE NOT PRESCRIBED, INTENTIONAL,     ASPIRIN PO     ACETAMINOPHEN PO        Allergies   Allergen Reactions     Bactrim Ds [Sulfamethoxazole W/Trimethoprim]      Creatinine level increased     Atorvastatin Calcium      myalgias, muscle aches     Codeine Nausea and Vomiting     Darvocet [Propoxyphene N-Apap] Nausea and Vomiting     Hydromorphone      Levofloxacin Diarrhea     Morphine      Nausea and vomiting     Niaspan [Niacin] GI Disturbance     Flushing even at low dose, GI upset     Percocet [Oxycodone-Acetaminophen]      Vomiting after taking med couple of times     Vicodin [Hydrocodone-Acetaminophen] Nausea and Vomiting     I have reviewed the Medications, Allergies, Past Medical and Surgical History, and Social History in the Epic system.    Review of Systems   Constitutional: Negative for fever.   HENT: Negative for congestion.    Eyes: Negative for redness.   Respiratory: Negative for shortness of breath.    Cardiovascular: Negative for chest pain.   Gastrointestinal: Positive for diarrhea. Negative for abdominal pain.   Genitourinary: Negative for difficulty urinating.   Musculoskeletal: Negative for arthralgias " and neck stiffness.   Skin: Negative for color change.   Neurological: Negative for headaches.   Psychiatric/Behavioral: Positive for confusion.   All other systems reviewed and are negative.      Physical Exam   BP: 129/65  Pulse: 57  Temp: 98.3  F (36.8  C)  Resp: 16  SpO2: 99 %  Physical Exam   Constitutional: No distress.   HENT:   Head: Atraumatic.   Mouth/Throat: Oropharynx is clear and moist. No oropharyngeal exudate.   Eyes: Pupils are equal, round, and reactive to light. No scleral icterus.   Cardiovascular: Normal heart sounds and intact distal pulses.    Pulmonary/Chest: Breath sounds normal. No respiratory distress.   Abdominal: Soft. Bowel sounds are normal. There is no tenderness.   Musculoskeletal: She exhibits no edema or tenderness.   Neurological: She is disoriented (oriented to person and place but not to time or date).   Diffuse lower extremity weakness   Skin: Skin is warm. No rash noted. She is not diaphoretic.       ED Course     12:16 PM  The patient was seen and examined by Dr. Paz in Room 3.     ED Course     Procedures             EKG Interpretation:      Interpreted by Timi Paz  Time reviewed: 1235  Symptoms at time of EKG: Mild confusion  Rhythm: Normal  Rate: 57  Axis: normal  Ectopy: none  Conduction: normal  ST Segments/ T Waves: No ST-T wave changes  Q Waves: none  Comparison to prior: Unchanged    Clinical Impression: normal EKG          Critical Care time:  none               Labs Ordered and Resulted from Time of ED Arrival Up to the Time of Departure from the ED   COMPREHENSIVE METABOLIC PANEL - Abnormal; Notable for the following:        Result Value    Glucose 61 (*)     Urea Nitrogen 42 (*)     Creatinine 3.37 (*)     GFR Estimate 13 (*)     GFR Estimate If Black 16 (*)     Calcium 10.2 (*)     Albumin 3.0 (*)     All other components within normal limits   CBC WITH PLATELETS DIFFERENTIAL - Abnormal; Notable for the following:     RBC Count 3.75 (*)     Hemoglobin 10.9  (*)     Hematocrit 34.5 (*)     RDW 15.1 (*)     Platelet Count 137 (*)     All other components within normal limits   UA MACROSCOPIC WITH REFLEX TO MICRO AND CULTURE - Abnormal; Notable for the following:     Blood Urine Moderate (*)     Protein Albumin Urine 300 (*)     Leukocyte Esterase Urine Large (*)     RBC Urine 154 (*)     WBC Urine >182 (*)     WBC Clumps Present (*)     Bacteria Urine Moderate (*)     Yeast Urine Many (*)     Transitional Epi 2 (*)     Mucous Urine Present (*)     All other components within normal limits   LACTIC ACID WHOLE BLOOD - Abnormal; Notable for the following:     Lactic Acid 0.6 (*)     All other components within normal limits   INR   CRP INFLAMMATION   BLOOD CULTURE   URINE CULTURE AEROBIC BACTERIAL   BLOOD CULTURE     Results for orders placed or performed during the hospital encounter of 03/08/17   Comprehensive metabolic panel   Result Value Ref Range    Sodium 135 133 - 144 mmol/L    Potassium 4.6 3.4 - 5.3 mmol/L    Chloride 98 94 - 109 mmol/L    Carbon Dioxide 28 20 - 32 mmol/L    Anion Gap 8 3 - 14 mmol/L    Glucose 61 (L) 70 - 99 mg/dL    Urea Nitrogen 42 (H) 7 - 30 mg/dL    Creatinine 3.37 (H) 0.52 - 1.04 mg/dL    GFR Estimate 13 (L) >60 mL/min/1.7m2    GFR Estimate If Black 16 (L) >60 mL/min/1.7m2    Calcium 10.2 (H) 8.5 - 10.1 mg/dL    Bilirubin Total 0.3 0.2 - 1.3 mg/dL    Albumin 3.0 (L) 3.4 - 5.0 g/dL    Protein Total 7.9 6.8 - 8.8 g/dL    Alkaline Phosphatase 87 40 - 150 U/L    ALT <6 0 - 50 U/L    AST 13 0 - 45 U/L   CBC with platelets differential   Result Value Ref Range    WBC 5.0 4.0 - 11.0 10e9/L    RBC Count 3.75 (L) 3.8 - 5.2 10e12/L    Hemoglobin 10.9 (L) 11.7 - 15.7 g/dL    Hematocrit 34.5 (L) 35.0 - 47.0 %    MCV 92 78 - 100 fl    MCH 29.1 26.5 - 33.0 pg    MCHC 31.6 31.5 - 36.5 g/dL    RDW 15.1 (H) 10.0 - 15.0 %    Platelet Count 137 (L) 150 - 450 10e9/L    Diff Method Automated Method     % Neutrophils 69.4 %    % Lymphocytes 21.6 %    % Monocytes  5.6 %    % Eosinophils 3.0 %    % Basophils 0.2 %    % Immature Granulocytes 0.2 %    Nucleated RBCs 0 0 /100    Absolute Neutrophil 3.5 1.6 - 8.3 10e9/L    Absolute Lymphocytes 1.1 0.8 - 5.3 10e9/L    Absolute Monocytes 0.3 0.0 - 1.3 10e9/L    Absolute Eosinophils 0.2 0.0 - 0.7 10e9/L    Absolute Basophils 0.0 0.0 - 0.2 10e9/L    Abs Immature Granulocytes 0.0 0 - 0.4 10e9/L    Absolute Nucleated RBC 0.0    UA reflex to Microscopic and Culture   Result Value Ref Range    Color Urine Yellow     Appearance Urine Cloudy     Glucose Urine Negative NEG mg/dL    Bilirubin Urine Negative NEG    Ketones Urine Negative NEG mg/dL    Specific Gravity Urine 1.015 1.003 - 1.035    Blood Urine Moderate (A) NEG    pH Urine 6.5 5.0 - 7.0 pH    Protein Albumin Urine 300 (A) NEG mg/dL    Urobilinogen mg/dL Normal 0.0 - 2.0 mg/dL    Nitrite Urine Negative NEG    Leukocyte Esterase Urine Large (A) NEG    Source Catheterized Urine     RBC Urine 154 (H) 0 - 2 /HPF    WBC Urine >182 (H) 0 - 2 /HPF    WBC Clumps Present (A) NEG /HPF    Bacteria Urine Moderate (A) NEG /HPF    Yeast Urine Many (A) NEG /HPF    Transitional Epi 2 (H) 0 - 1 /HPF    Mucous Urine Present (A) NEG /LPF   INR   Result Value Ref Range    INR 1.01 0.86 - 1.14   Lactic acid whole blood   Result Value Ref Range    Lactic Acid 0.6 (L) 0.7 - 2.1 mmol/L   CRP inflammation   Result Value Ref Range    CRP Inflammation 7.5 0.0 - 8.0 mg/L   EKG 12 lead   Result Value Ref Range    Interpretation ECG Click View Image link to view waveform and result    Blood culture   Result Value Ref Range    Specimen Description Blood Right Arm     Culture Micro Pending     Micro Report Status Pending      *Note: Due to a large number of results and/or encounters for the requested time period, some results have not been displayed. A complete set of results can be found in Results Review.     Medications   0.9% sodium chloride BOLUS (0 mLs Intravenous Stopped 3/8/17 6104)     Followed by   0.9%  sodium chloride BOLUS (0 mLs Intravenous Stopped 3/8/17 0784)     Followed by   0.9% sodium chloride infusion (not administered)         Assessments & Plan (with Medical Decision Making)   71-year-old female who is approximately 17 years post renal transplant with known baseline creatinine of approximately 1 or less who presents secondary to abrupt elevation in creatinine measured 2 days ago at 3.49.  By history, patient does admit to mild decrease in appetite, some diarrhea and weight loss.  Her exam is remarkable for being oriented ×2 and difficulty with short-term memory.  Laboratories were obtained revealing CBC with a hemoglobin of 10.9 and hematocrit of 34.5 as well as platelet count of 137 close to baseline.  Comprehensive metabolic panel revealed a creatinine of 3.37 significantly above baseline.  Urinalysis in the setting of chronic indwelling Johnson reveals many yeast, white blood cells and red blood cells as well as white blood cell clumps.  Urine culture was sent and is pending.   The patient was given 2 L of fluid while in the emergency room to treat for possible prerenal etiology.  A transplant ultrasound was obtained results of which are pending.  The case was discussed at length with transplant nephrology as well as internal medicine the latter of whom has agreed to accept the patient as an admission for further evaluation and management.    I have reviewed the nursing notes.    I have reviewed the findings, diagnosis, plan and need for follow up with the patient.    Current Discharge Medication List          Final diagnoses:   Acute kidney injury (H)   Hypercalcemia     Brionna LU, am serving as a trained medical scribe to document services personally performed by Timi Paz MD, based on the provider's statements to me.      Timi LU MD, was physically present and have reviewed and verified the accuracy of this note documented by Brionna Hurd.     3/8/2017   [facility] [place]  DEPARTMENT     Klos, Timi KAT MD  03/08/17 4478

## 2017-03-09 LAB
ALBUMIN SERPL-MCNC: 2.7 G/DL (ref 3.4–5)
ANION GAP SERPL CALCULATED.3IONS-SCNC: 8 MMOL/L (ref 3–14)
BUN SERPL-MCNC: 39 MG/DL (ref 7–30)
CA-I BLD-MCNC: 4.9 MG/DL (ref 4.4–5.2)
CALCIUM SERPL-MCNC: 8.9 MG/DL (ref 8.5–10.1)
CHLORIDE SERPL-SCNC: 107 MMOL/L (ref 94–109)
CK SERPL-CCNC: 36 U/L (ref 30–225)
CO2 SERPL-SCNC: 26 MMOL/L (ref 20–32)
CREAT SERPL-MCNC: 2.94 MG/DL (ref 0.52–1.04)
ERYTHROCYTE [DISTWIDTH] IN BLOOD BY AUTOMATED COUNT: 14.9 % (ref 10–15)
FOLATE SERPL-MCNC: 5.2 NG/ML
GFR SERPL CREATININE-BSD FRML MDRD: 16 ML/MIN/1.7M2
GLUCOSE BLDC GLUCOMTR-MCNC: 115 MG/DL (ref 70–99)
GLUCOSE BLDC GLUCOMTR-MCNC: 157 MG/DL (ref 70–99)
GLUCOSE BLDC GLUCOMTR-MCNC: 188 MG/DL (ref 70–99)
GLUCOSE BLDC GLUCOMTR-MCNC: 273 MG/DL (ref 70–99)
GLUCOSE SERPL-MCNC: 138 MG/DL (ref 70–99)
HCT VFR BLD AUTO: 30.6 % (ref 35–47)
HGB BLD-MCNC: 9.4 G/DL (ref 11.7–15.7)
MCH RBC QN AUTO: 28.1 PG (ref 26.5–33)
MCHC RBC AUTO-ENTMCNC: 30.7 G/DL (ref 31.5–36.5)
MCV RBC AUTO: 92 FL (ref 78–100)
PHOSPHATE SERPL-MCNC: 3.2 MG/DL (ref 2.5–4.5)
PLATELET # BLD AUTO: 107 10E9/L (ref 150–450)
POTASSIUM SERPL-SCNC: 4.1 MMOL/L (ref 3.4–5.3)
RBC # BLD AUTO: 3.34 10E12/L (ref 3.8–5.2)
SODIUM SERPL-SCNC: 141 MMOL/L (ref 133–144)
T4 FREE SERPL-MCNC: 0.72 NG/DL (ref 0.76–1.46)
TACROLIMUS BLD-MCNC: 4.7 UG/L (ref 5–15)
TME LAST DOSE: ABNORMAL H
TSH SERPL DL<=0.05 MIU/L-ACNC: 1.2 MU/L (ref 0.4–4)
VIT B12 SERPL-MCNC: 1402 PG/ML (ref 193–986)
WBC # BLD AUTO: 3.5 10E9/L (ref 4–11)

## 2017-03-09 PROCEDURE — 85027 COMPLETE CBC AUTOMATED: CPT | Performed by: STUDENT IN AN ORGANIZED HEALTH CARE EDUCATION/TRAINING PROGRAM

## 2017-03-09 PROCEDURE — 12000001 ZZH R&B MED SURG/OB UMMC

## 2017-03-09 PROCEDURE — 25000128 H RX IP 250 OP 636: Performed by: EMERGENCY MEDICINE

## 2017-03-09 PROCEDURE — 86803 HEPATITIS C AB TEST: CPT | Performed by: STUDENT IN AN ORGANIZED HEALTH CARE EDUCATION/TRAINING PROGRAM

## 2017-03-09 PROCEDURE — 87340 HEPATITIS B SURFACE AG IA: CPT | Performed by: STUDENT IN AN ORGANIZED HEALTH CARE EDUCATION/TRAINING PROGRAM

## 2017-03-09 PROCEDURE — 82746 ASSAY OF FOLIC ACID SERUM: CPT | Performed by: STUDENT IN AN ORGANIZED HEALTH CARE EDUCATION/TRAINING PROGRAM

## 2017-03-09 PROCEDURE — 84443 ASSAY THYROID STIM HORMONE: CPT | Performed by: STUDENT IN AN ORGANIZED HEALTH CARE EDUCATION/TRAINING PROGRAM

## 2017-03-09 PROCEDURE — 36415 COLL VENOUS BLD VENIPUNCTURE: CPT | Performed by: STUDENT IN AN ORGANIZED HEALTH CARE EDUCATION/TRAINING PROGRAM

## 2017-03-09 PROCEDURE — 25000131 ZZH RX MED GY IP 250 OP 636 PS 637: Mod: GY | Performed by: STUDENT IN AN ORGANIZED HEALTH CARE EDUCATION/TRAINING PROGRAM

## 2017-03-09 PROCEDURE — 25000132 ZZH RX MED GY IP 250 OP 250 PS 637: Mod: GY | Performed by: STUDENT IN AN ORGANIZED HEALTH CARE EDUCATION/TRAINING PROGRAM

## 2017-03-09 PROCEDURE — 87799 DETECT AGENT NOS DNA QUANT: CPT | Performed by: STUDENT IN AN ORGANIZED HEALTH CARE EDUCATION/TRAINING PROGRAM

## 2017-03-09 PROCEDURE — 40000275 ZZH STATISTIC RCP TIME EA 10 MIN

## 2017-03-09 PROCEDURE — 25000131 ZZH RX MED GY IP 250 OP 636 PS 637: Mod: GY | Performed by: INTERNAL MEDICINE

## 2017-03-09 PROCEDURE — 25000128 H RX IP 250 OP 636: Performed by: STUDENT IN AN ORGANIZED HEALTH CARE EDUCATION/TRAINING PROGRAM

## 2017-03-09 PROCEDURE — 82607 VITAMIN B-12: CPT | Performed by: STUDENT IN AN ORGANIZED HEALTH CARE EDUCATION/TRAINING PROGRAM

## 2017-03-09 PROCEDURE — 84439 ASSAY OF FREE THYROXINE: CPT | Performed by: STUDENT IN AN ORGANIZED HEALTH CARE EDUCATION/TRAINING PROGRAM

## 2017-03-09 PROCEDURE — 82330 ASSAY OF CALCIUM: CPT | Performed by: INTERNAL MEDICINE

## 2017-03-09 PROCEDURE — 99212 OFFICE O/P EST SF 10 MIN: CPT

## 2017-03-09 PROCEDURE — 82550 ASSAY OF CK (CPK): CPT | Performed by: STUDENT IN AN ORGANIZED HEALTH CARE EDUCATION/TRAINING PROGRAM

## 2017-03-09 PROCEDURE — 25000125 ZZHC RX 250: Performed by: INTERNAL MEDICINE

## 2017-03-09 PROCEDURE — 00000146 ZZHCL STATISTIC GLUCOSE BY METER IP

## 2017-03-09 PROCEDURE — A9270 NON-COVERED ITEM OR SERVICE: HCPCS | Mod: GY | Performed by: STUDENT IN AN ORGANIZED HEALTH CARE EDUCATION/TRAINING PROGRAM

## 2017-03-09 PROCEDURE — 87389 HIV-1 AG W/HIV-1&-2 AB AG IA: CPT | Performed by: STUDENT IN AN ORGANIZED HEALTH CARE EDUCATION/TRAINING PROGRAM

## 2017-03-09 PROCEDURE — 99233 SBSQ HOSP IP/OBS HIGH 50: CPT | Mod: GC | Performed by: INTERNAL MEDICINE

## 2017-03-09 PROCEDURE — 80069 RENAL FUNCTION PANEL: CPT | Performed by: STUDENT IN AN ORGANIZED HEALTH CARE EDUCATION/TRAINING PROGRAM

## 2017-03-09 RX ORDER — TACROLIMUS 1 MG/1
2 CAPSULE ORAL 2 TIMES DAILY
Status: DISCONTINUED | OUTPATIENT
Start: 2017-03-09 | End: 2017-03-10

## 2017-03-09 RX ORDER — ONDANSETRON 2 MG/ML
4 INJECTION INTRAMUSCULAR; INTRAVENOUS EVERY 6 HOURS PRN
Status: DISCONTINUED | OUTPATIENT
Start: 2017-03-09 | End: 2017-03-15 | Stop reason: HOSPADM

## 2017-03-09 RX ORDER — GABAPENTIN 300 MG/1
300 CAPSULE ORAL 2 TIMES DAILY
Status: DISCONTINUED | OUTPATIENT
Start: 2017-03-09 | End: 2017-03-11

## 2017-03-09 RX ORDER — HYDRALAZINE HYDROCHLORIDE 20 MG/ML
10-20 INJECTION INTRAMUSCULAR; INTRAVENOUS EVERY 6 HOURS PRN
Status: DISCONTINUED | OUTPATIENT
Start: 2017-03-09 | End: 2017-03-15 | Stop reason: HOSPADM

## 2017-03-09 RX ORDER — GABAPENTIN 300 MG/1
300 CAPSULE ORAL 2 TIMES DAILY
Status: DISCONTINUED | OUTPATIENT
Start: 2017-03-09 | End: 2017-03-09

## 2017-03-09 RX ADMIN — LABETALOL HCL 200 MG: 200 TABLET, FILM COATED ORAL at 09:06

## 2017-03-09 RX ADMIN — HEPARIN SODIUM 5000 UNITS: 5000 INJECTION, SOLUTION INTRAVENOUS; SUBCUTANEOUS at 20:09

## 2017-03-09 RX ADMIN — CEFTRIAXONE 2 G: 2 INJECTION, POWDER, FOR SOLUTION INTRAMUSCULAR; INTRAVENOUS at 20:04

## 2017-03-09 RX ADMIN — GABAPENTIN 300 MG: 300 CAPSULE ORAL at 09:07

## 2017-03-09 RX ADMIN — PREDNISONE 2.5 MG: 2.5 TABLET ORAL at 09:07

## 2017-03-09 RX ADMIN — SIROLIMUS 1.5 MG: 1 TABLET, SUGAR COATED ORAL at 09:06

## 2017-03-09 RX ADMIN — CLONIDINE HYDROCHLORIDE 0.1 MG: 0.1 TABLET ORAL at 20:01

## 2017-03-09 RX ADMIN — LABETALOL HCL 200 MG: 200 TABLET, FILM COATED ORAL at 20:01

## 2017-03-09 RX ADMIN — INSULIN GLARGINE 10 UNITS: 100 INJECTION, SOLUTION SUBCUTANEOUS at 21:08

## 2017-03-09 RX ADMIN — LEVOTHYROXINE SODIUM 50 MCG: 50 TABLET ORAL at 09:06

## 2017-03-09 RX ADMIN — FAMOTIDINE 20 MG: 20 TABLET ORAL at 20:01

## 2017-03-09 RX ADMIN — HYDRALAZINE HYDROCHLORIDE 10 MG: 20 INJECTION INTRAMUSCULAR; INTRAVENOUS at 21:06

## 2017-03-09 RX ADMIN — TACROLIMUS 2 MG: 1 CAPSULE ORAL at 20:09

## 2017-03-09 RX ADMIN — GABAPENTIN 300 MG: 300 CAPSULE ORAL at 21:11

## 2017-03-09 RX ADMIN — CLONIDINE HYDROCHLORIDE 0.1 MG: 0.1 TABLET ORAL at 09:06

## 2017-03-09 RX ADMIN — SODIUM CHLORIDE 1000 ML: 9 INJECTION, SOLUTION INTRAVENOUS at 05:04

## 2017-03-09 RX ADMIN — HEPARIN SODIUM 5000 UNITS: 5000 INJECTION, SOLUTION INTRAVENOUS; SUBCUTANEOUS at 09:07

## 2017-03-09 RX ADMIN — FLUCONAZOLE 200 MG: 200 TABLET ORAL at 09:06

## 2017-03-09 RX ADMIN — FAMOTIDINE 20 MG: 20 TABLET ORAL at 09:06

## 2017-03-09 RX ADMIN — INSULIN ASPART 1 UNITS: 100 INJECTION, SOLUTION INTRAVENOUS; SUBCUTANEOUS at 19:19

## 2017-03-09 RX ADMIN — PRAVASTATIN SODIUM 10 MG: 10 TABLET ORAL at 09:15

## 2017-03-09 NOTE — PROVIDER NOTIFICATION
Referred to Julia Ayala regarding pt removing CPAP due to confusion and verbalizing discomfort with mask. Dr. Lund responded, may use 02 nasal cannula overnight at 2 lpm

## 2017-03-09 NOTE — PLAN OF CARE
Problem: Goal Outcome Summary  Goal: Goal Outcome Summary  Pt is alert, confused at times. Intermittent disorientation to situation . Received on CPAP. Constantly removing mask and verbalized discomfort with the mask. Removed CPAP and started on 02 inhalation via nasal canula overnight. Dr. Quiroz informed. Johnson catheter intact draining  Pale yellow urine, moderate in amount. Noted unblanchable redness by sacral area, WOC consult placed. Turned and repositioned overnight. Will continue to monitor and follow plan of care

## 2017-03-09 NOTE — PROGRESS NOTES
[facility]  Patient is currently open to home care services with [facility].  The patient is currently receiving RN and HHA        services.  Counts include 234 beds at the [facility]  and team have been notified of patient admission.  Counts include 234 beds at the [facility] liaison will continue to follow patient during stay.  If appropriate provide orders to resume home care at time of discharge.  Porsha Haas RN  [facility] Liaison

## 2017-03-09 NOTE — PROGRESS NOTES
Care Coordinator Progress Note     Admission Date/Time:  3/8/2017  Attending MD:  Roland Reese MD     Data  Chart reviewed, discussed with interdisciplinary team.   Patient was admitted for:    Acute kidney injury (H)  Hypercalcemia.    Concerns with insurance coverage for discharge needs: None.  Current Living Situation: Patient lives with spouse.  Support System: Supportive and Involved  Services Involved: DME and Home Care  Transportation: Agency transportation  Barriers to Discharge: Pt is not medically stable for discharge at this time.     Assessment  Pt is a 71 year old male with PMH significant for kidney transplant, chronic indwellling rico with recurrent complicated UTIs, morbid obesity, severe vertigo, generalized weakness, chronic pain, and is bed bound who was admitted for altered mental status. Per chart review, pt is open to [facility] for skilled nursing and home health aide. Will place resumption orders if appropriate and will continue to follow plan of care.      Plan  Anticipated Discharge Date:  To be determined  Anticipated Discharge Plan: Pt will likely discharge to home with assist from spouse and resumption of home care services.     Myrna Biswas RN

## 2017-03-09 NOTE — PROGRESS NOTES
"Pt was received to 5 A from the ER at 1630. Pt was brought up on the stretcher.   /60 (BP Location: Right arm)  Pulse 65  Temp 97.9  F (36.6  C) (Oral)  Resp 16  Ht 1.702 m (5' 7\")  Wt 88.1 kg (194 lb 3.2 oz)  SpO2 98%  BMI 30.42 kg/m2  Pt was comfortably situated.  spoke with staff via phone multiple times and is updated.   CPAP is on successfully.   Fluids are infusing. We will continue to monitor her.   Shira Begum RN   "

## 2017-03-09 NOTE — PROGRESS NOTES
Medicine Daily Progress Note   03/09/2017    Gem Jade MRN# [id number]   Age: 71 year old  Sex: female YOB: 1945  Date of Admissions: 3/8/2017          Assessment and Plan:   70 y/o female with PMH significant for kidney transplant (1999), complex UTIs, morbid obesity, mobility issues, fatigue, DM-II, chronic pain, hypothyroidism, HTN, and HLD who was admitted for acute kidney injury and altered mental status    # ROB in setting of UTI vs transplant rejection  Creatinine 3.49 on 3/6/2017, trending down to 2.94 today, up from baseline of 0.8 - 1.0.   - Renal Transplant ultrasound performed, noting resolved hydronephrosis but mildly increase resistive indices in arcuate arteries, concerning for rejection  - Consult placed to transplant nephrology, who believe that this is not rejection but could be infectious process. Recommended viral work-up and tacrolimus/sirolimus level.  - Also recommended consulting Transplant Infectious Disease. Will pursue consult after results from initial workup  - HIV screen, EBV quant, CMV quant, BK virus quant, HepC antigen, and HepB surface antigen ordered.   - Her  Keshawn had an unpleasant experience during her last admission in regards to communication with the treatment team. He wishes to be called and updated in regards to his wife's status.     # Altered mental status in setting of UTI vs metabolic encephalopathy  Concern raised due to unresolved AMS since before previous visit. Suspicious for systemic causes (endocrine vs vitamin deficiency) due to longstanding AMS.  - Folate, B12, CK-Total, TSH/T4 ordered.   - Treatment of UTI (see below)  - Continue trending BMP to rule out electrolyte disturbances  - Gabapentin lowered from 900mg BID to 300mg BID. Still concerning for AMS.    # Sacral ulcer  Seen on previous admit in February 2017.   - Order placed to Wound Care nurse. Appreciate dressing and recs.    # Urinary tract infection  Cloudy urine identified  upon admission. Urine today is pale yellow in bag. Concerning for UTI due to presence of WBCs and leukocyte esterase.   - Urine culture in process  - Blood cultures (3/8/17) - NGTD  - Ceftriaxone 2g q24h  - Discontinue fluconazole due to previous yeast contaminant     # Hypertension  - Continue home clonidine and labetalol  - Hydralazine PRN  - Discontinue IVF    # HLD  - Continue home pravastatin     # Pain:   - Continue acetaminophen PRN  - Gabapentin lowered from 900mg BID to 300mg BID. Still concerning for AMS.    # Diarrhea:   - Continue lomotil  - Continue Imodium PRN    # Hypothyroid  - Continue Synthroid  - Will monitor TSH/T4 levels    # Diabetes:   - Continue Lantus 10 units  - Medium SSI  - POC glucose checks before meals and bedtime    Summary  FEN: Renal Diet  Prophylaxis  - Isolation: Contact  - Lung: CPAP at night  - DVT: SubQ heparin  - GI: Pepcid  Code Status: FULL    Disposition: Pending workup for altered mental status / infection.     Patient was seen and discussed with Dr. Roland Richard, ZULAY  Oral & Maxillofacial Surgery Resident  PGY-1  [phone]     Subjective:   Gem Jade is a 70 y/o female with PMH significant for kidney transplant (1999), complex UTIs (ESBL Klebsiella pneumoniae and E. coli), history of VRE, morbid obesity, mobility issues, fatigue, DM-II, chronic pain, hypothyroidism, HTN, and HLD who was admitted for acute kidney injury in the setting of urinary tract infection. She was recently admitted from 2/16/17 - 2/20/17 due to altered mental status with UTI and hydronephrosis of the transplant kidney. She was treated with ertapenem and discharged on ciprofloxacin 500mg BID that was finished on 3/2.     She reports that she is feeling well, but is concerned about her altered mental status. She endorses that she sometimes sees people that she knows are not there (someone sitting next to her while she watches TV or passing someone in the redding only for them to  "vanish). She denies complex interactions with these hallucinations. She reports an adequate diet and PO intake although she reports that she has been trying to lose weight. She has baseline vertigo, poor vision, and balance issues that she states have contributed to her bed-bound status. She does report intermittent diarrhea that has grown more frequent. She is cared for at home by her , Keshawn. She denies pain associated with her indwelling catheter, trouble breathing or shortness of breath, sore throat, dysphagia, dysgeusia, or fevers/chills.     Her  Kesahwn reports that she has had a history of UTIs that have grown more complex and severe over the past few years. He states that she has had bouts of confusion before but that they have subsided after fluid resuscitation and antibiotics. He reports that she has been bed bound since 2011 after a fall off the stair lift, complicated by her existing vertigo, Charcot's foot, poor vision, knee surgeries, and balance issues. He reports that her diarrhea and confusion have been persistent over the past month, and were present prior to her 17 admission with little improvement since. She has had C. difficile and he does not believe her diarrhea has that consistency, qualifying it as \"loose\" but not watery. He confirmed that the last time her Johnson was changed was during her last hospital stay.      Physical Exam:   Vitals:  Temp:  [97.1  F (36.2  C)-98.4  F (36.9  C)] 98.4  F (36.9  C)  Pulse:  [65-87] 65  Resp:  [16-18] 18  BP: (116-176)/() 167/52  SpO2:  [94 %-100 %] 99 %    Temp  Av.6  F (36.4  C)  Min: 96.2  F (35.7  C)  Max: 98.4  F (36.9  C)      Pulse  Av.5  Min: 57  Max: 87 Resp  Av.5  Min: 8  Max: 30  SpO2  Av.3 %  Min: 87 %  Max: 100 %  FiO2 (%)  Av %  Min: 40 %  Max: 40 %       Wt Readings from Last 4 Encounters:   17 88.1 kg (194 lb 3.2 oz)   17 93.1 kg (205 lb 4.8 oz)   16 91.4 kg (201 lb 8 " oz)   08/09/16 90.7 kg (200 lb)       Intake/Output Summary (Last 24 hours) at 03/09/17 1311  Last data filed at 03/09/17 1119   Gross per 24 hour   Intake          2101.67 ml   Output             2330 ml   Net          -228.33 ml       General: Laying in bed, in no acute distress. Tolerating breakfast and juice.  Heme/Lymph: No overt bleeding. No palpable cervical or clavicular adenopathy.  Skin: Sacral wound noted, confined to epidermis. No other concerning rash, jaundice or ecchymoses.   HEENT: Atraumatic, normocephalic. Pupils equally round and reactive to light, EOMI. Anicteric sclera. No swelling or masses.   Respiratory: Non-labored breathing, lungs clear to auscultation bilaterally.  Cardiovascular: Regular rate and rhythm, normal S1 and S2   Gastrointestinal: Normoactive bowel sounds. Abdomen soft, non-distended, and non-tender. No palpable organomegaly.  : Johnson in place, urine pale yellow in collection bag  Extremities: Lower extremity edema. Upper extremities have equal strength and mobility.   Neurologic: A&O x 3 (oriented to self, place, and president, not oriented to time), speech normal         Laboratory Data:   ROUTINE ICU LABS (Last four results)  CMP  Recent Labs  Lab 03/09/17 0750 03/08/17  1243 03/06/17  1200    135 135   POTASSIUM 4.1 4.6 4.5   CHLORIDE 107 98 100   CO2 26 28 29   ANIONGAP 8 8 6   * 61* 93   BUN 39* 42* 43*   CR 2.94* 3.37* 3.49*   GFRESTIMATED 16* 13* 13*   GFRESTBLACK 19* 16* 16*   ROGER 8.9 10.2* 11.2*   PHOS 3.2  --   --    PROTTOTAL  --  7.9  --    ALBUMIN 2.7* 3.0*  --    BILITOTAL  --  0.3  --    ALKPHOS  --  87  --    AST  --  13  --    ALT  --  <6  --      CBC  Recent Labs  Lab 03/09/17  0750 03/08/17 2005 03/08/17  1243 03/06/17  1200   WBC 3.5*  --  5.0 3.9*   RBC 3.34*  --  3.75* 3.46*   HGB 9.4*  --  10.9* 10.1*   HCT 30.6*  --  34.5* 32.2*   MCV 92  --  92 93   MCH 28.1  --  29.1 29.2   MCHC 30.7*  --  31.6 31.4*   RDW 14.9  --  15.1* 14.5   PLT  107* 128* 137* 142*     INR  Recent Labs  Lab 03/08/17  1243   INR 1.01         Culture data:  Urine culture (3/3/2017) - pending  Blood cultures (3/3/2017) - NGTD            Imaging:     Recent Results (from the past 24 hour(s))   US Renal Transplant    Narrative    EXAMINATION: US RENAL TRANSPLANT, 3/8/2017 3:12 PM     COMPARISON: 2/16/2017    HISTORY: Acute kidney injury    TECHNIQUE:  Grey-scale, color Doppler and spectral flow analysis.    FINDINGS:  The transplant kidney is located right lower quadrant, and measures  13.1 cm length. Parenchyma is of normal thickness and echogenicity. No  focal lesions. No hydronephrosis. No perinephric fluid collection.      Renal Vein Flow:  12 cm/sec at hilum.   40 cm/sec at anastomosis.    Upper Pole Arcuate artery:  0.83 resistive index.    Mid pole Arcuate artery:  0.86 resistive index.     Lower Pole Arcuate artery:  0.84 resistive index.     Renal artery flow:   89 cm/sec peak systolic at hilum.  104 cm/sec peak systolic at anastomosis.    Iliac artery flow:  152 cm/sec peak systolic above anastomosis.  90 cm/sec peak systolic below anastomosis.    Iliac vein flow:  Patent above and below        Impression    IMPRESSION:    1. Mildly increased resistive indices in arcuate arteries, slightly  decreased from previous study. Patent Doppler examination of the  transplant vasculature. The increased RI's are of concern for  rejection.  2. Resolution of hydronephrosis.      I have personally reviewed the examination and initial interpretation  and I agree with the findings.    BRIAN RICHARD MD

## 2017-03-09 NOTE — PROGRESS NOTES
[facility] Nurse Inpatient Adult Wound Assessment    Initial Assessment  Reason for consultation: Evaluate and treat sacrum    Assessment:   Sacral wound due to skin discoloration versus bruise, versus healing DTPI.      Pressure Injury is Stage deferred at this timel Present on admission: Yes    Contributing factor of the pressure injury: poor mobility  Status: initial assessment    Last Photo: 3/9/17  See assessment below    TREATMENT  PLAN  Bilateral buttocks wound: cleanse with microklenz and pat dry.  Apply sacral mepilex, change every third day.    Orders Written  WO Nurse follow-up plan: Weekly  Nursing to notify the Provider(s) and re-consult the WO Nurse if wound(s) deteriorates or new skin concern.    Patient History  According to provider note(s): Gem Jade is a 71 year old male with a hx of kidney transplant on sirolimus/tacrolimus/prednisone and chronic indwelling rico with recurrent complicated UTIs, morbid obesity, severe vertigo, generalized weakness, chronic pain, bed bound  Hospital (17-17) due to altered mental status with hydronephrosis.    Objective Data   Containment of urine/stool: Continent of bowel and Indwelling catheter    Current Diet/ Nutrition    Active Diet Order    Active Diet Order      Renal Diet (non-dialysis)    Output:  I/O last 3 completed shifts:  In: 2581.67 [P.O.:1440; I.V.:1141.67]  Out: 2230 [Urine:2230]    Risk Assessment:   Albino Albino Score  Av.2  Min: 10  Max: 16  Albino Q No Data Recorded     NSRAS No Data Recorded                                  Labs:     Recent Labs  Lab 17  0750 17  1243   HGB 9.4* 10.9*   INR  --  1.01   WBC 3.5* 5.0   CRP  --  7.5       Recent Labs  Lab 17  1425 17  1316 17  1243 17  1800   CULT No growth after 22 hours Culture in progress<10,000 colonies/mL mixed urogenital santos Susceptibility testing not routinely done No growth after 23 hours 50,000 to 100,000 colonies/mL Candida albicans  "/ dubliniensis Candida albicans and Candida dubliniensis are not routinely speciated Susceptibility testing not routinely done*       Physical Exam  Skin assessment:   Focused skin inspection: sacrum/buttocks    Wound Location:  Fleshy part of buttocks  Wound History: Seen also on previous admit 2/2017. Pt unable to lift buttocks to move in bed, bed bound.  Admits to sliding.  Peeling skin consistent with use of critic aid paste, which can be drying over time.     3/9/17    Measurements (length x width x depth, in cm) Overall dry/peeling skin over buttocks.    Right: 2 x 1 x 0 cm  Light maroon colored intact skin  Left 4 x 2 x 0 cm maroon colored intact skin  Wound Base:  100% epidermis  Palpation of the wound bed: normal   Periwound skin: intact and dry/scaly  Color: normal and consistent with surrounding tissue  Temperature: normal   Drainage: none  Description of drainage: none  Odor: none  Pain: score 3/10, aching at times on the right, denies pain on the left.      Interventions  Current support surface: Standard  Atmos Air  Current off-loading measures: Pillows under calves  Visual inspection of wounds(s) completed.  Wound Care: was done per plan of care.  Supplies: sacral mepilex  Education provided today: call for assist to reposition in bed, don't slide or \"scoot\" up in bed.      Discussed plan of care with Patient and Nurse  Face to face time: 20 minutes    "

## 2017-03-09 NOTE — PHARMACY-ADMISSION MEDICATION HISTORY
Admission medication history interview status for the 3/8/2017 admission is complete. See Epic admission navigator for allergy information, pharmacy, prior to admission medications and immunization status.     Medication history interview sources:  Patient, patient's  and [facility] chart review, [facility]    -Attempted to contact [facility] to check regarding tacrolimus and RAPAMUNE levels.     Changes made to PTA medication list (reason)  Added: none  Deleted:   Imodium-patient's  reported as patient not taking  Changed:  Tramadol- Changed dosing frequency to taken twice daily from 3 times daily as needed.     Additional medication history information (including reliability of information, actions taken by pharmacist):    Patient reported that she has a home care nurse and  helping with medications at home and to contact the her  with questions. Patient's 's information appears somewhat reliable as he was able to verify some last doses given of medications but did not know exact dose of tacrolimus and if brand vs generic.     Per patient's , she did not receive any insulin today.    Patient had influenza vaccination 10/7/16 per [facility].      Prior to Admission medications    Medication Sig Last Dose Taking? Auth Provider   diphenoxylate-atropine (LOMOTIL) 2.5-0.025 MG per tablet Take 1 tablet by mouth 4 times daily as needed for diarrhea 3/7/2017 at pm Yes Marivel Barcenas APRN CNP   TraMADol HCl 50 MG TBDP Take 50 mg by mouth 3 times daily as needed  Patient taking differently: Take 50 mg by mouth 2 times daily  3/7/2017 at Unknown time Yes Marivel Barcenas APRN CNP   insulin aspart (NOVOLOG FLEXPEN) 100 UNIT/ML injection Inject 7 units before meals BID. At bedtime use following sliding scale:   150-200 5u  201-250 7u  251-300 9u  301-350 11u  351+ 13u 3/7/2017 at pm Yes    OXcarbazepine (TRILEPTAL) 300 MG tablet Take 1 tablet (300  mg) by mouth 2 times daily 3/8/2017 at Unknown time Yes Marivel Barcenas APRN CNP   levothyroxine (SYNTHROID) 50 MCG tablet Take 1 tablet (50 mcg) by mouth daily 3/7/2017 at Unknown time Yes Marivel Barcenas APRN CNP   cloNIDine (CATAPRES) 0.1 MG tablet Take 1 tablet (0.1 mg) by mouth 2 times daily 3/7/2017 at Unknown Yes Marivel Barcenas APRN CNP   gabapentin (NEURONTIN) 300 MG capsule Take 3 tabs 2x daily 3/7/2017 at Unknown time Yes Marivel Barcenas APRN CNP   sirolimus (Rapamune - generic) 1 MG PO TABLET Take 1.5 mg by mouth daily 3/8/2017 at Unknown time Yes Ross Elizabeth MD   tacrolimus (PROGRAF - GENERIC EQUIVALENT) 1 MG capsule Take 2 capsules (2 mg) by mouth 2 times daily 3/8/2017  Yes Ross Elizabeth MD          insulin glargine (LANTUS SOLOSTAR) 100 UNIT/ML PEN Inject 14 units under the skin daily. 3/7/2017 at pm Yes Marivel Barcenas APRN CNP   loratadine (CLARITIN) 10 MG tablet Take 1 tablet (10 mg) by mouth daily 3/7/2017 at Unknown time Yes Marivel Barcenas APRN CNP   pravastatin (PRAVACHOL) 10 MG tablet Take 1 tablet (10 mg) by mouth daily 3/7/2017 at Unknown time Yes Marivel Barcenas APRN CNP   Lactobacillus Rhamnosus, GG, (CULTURELL) capsule Take 1 capsule by mouth 2 times daily 3/7/2017 at Unknown time Yes She Siddiqui MD   predniSONE (DELTASONE) 2.5 MG tablet Take 1 tablet (2.5 mg) by mouth daily 3/7/2017 at Unknown time Yes Monico Heck MD   Cranberry 400 MG TABS Take 400 mg by mouth 2 times daily 3/7/2017 at Unknown Yes Unknown, Entered By History   phenazopyridine (PYRIDIUM) 100 MG tablet Take 1 tablet (100 mg) by mouth 3 times daily as needed for irritation Per patient's , the patient is probably still using this medication. at Last dose unknown Yes Marivel Barcenas APRN CNP   famotidine (PEPCID) 20 MG tablet Take 1 tablet (20 mg) by mouth 2 times daily 3/7/2017 at Unknown time  "Yes Marivel Barcenas APRN CNP   ASPIRIN PO Take 81 mg by mouth daily.   3/7/2017 at am Yes Unknown, Entered By History   ACETAMINOPHEN PO Take 325-650 mg by mouth every 4 hours as needed. Past Month at Last used about 3 weeks ago per patient's  for UTI Yes Unknown, Entered By History          blood glucose monitoring (ACCU-CHEK COMPACT PLUS) test strip Use to test blood sugar 3 times daily or as directed.   Marivel Barcenas APRN CNP   blood glucose monitoring (ACCU-CHEK COMPACT CARE KIT) meter device kit Use to test blood sugar 3 times daily or as directed.   Marivel Barcenas APRN CNP   labetalol (NORMODYNE) 200 MG tablet Take 1 tablet (200 mg) by mouth 2 times daily   Marivel Barcenas APRN CNP   cyanocobalamin (VITAMIN B12) 1000 MCG/ML injection Inject 1 mL (1,000 mcg) into the muscle every 30 days Left message for [facility] to speak with nurse regarding last administration. at Unknown time  Lorenzo Choudhury MD   syringe/needle, disp, (B-D SYRINGE/NEEDLE) 22G X 1-1/2\" 3 ML MISC Inject 1 Syringe into the muscle every 30 days   Lorenzo Choudhury MD   order for DME Equipment being ordered: Ahuja FX CPAP interface (nasal pillows), size XS.   Lorenzo Choudhury MD   glucagon (GLUCAGON EMERGENCY) 1 MG injection Inject 1 mg into the muscle once Patient has medication at home in case of emergency at Per , hasn't used in 2 years  Barb Gordon APRN CNP   clotrimazole (LOTRIMIN) 1 % cream Apply topically 2 times daily Apply to buttocks twice a day until rash resolved. Taken as needed.  did not know last dose.  Barb Gordon APRN CNP   insulin pen needle 30G X 8 MM Use as directed with Lantus   Marivel Barcenas APRN CNP   ondansetron (ZOFRAN ODT) 4 MG disintegrating tablet Take 1-2 tablets (4-8 mg) by mouth every 8 hours as needed for nausea Unknown time; used as needed  Marivel Barcenas APRN CNP   simethicone (MYLICON) 125 " MG CHEW Take 1 tablet (125 mg) by mouth as needed for intestinal gas Unknown time; taken as needed     Nystatin (NYSTOP) 589408 UNIT/GM POWD Externally apply 1 g topically 3 times daily as needed Unknown time; used as needed  Marivel Barcenas APRN CNP   order for DME Equipment being ordered: Blue Chux  Please send to [facility]   Marivel Barcenas APRN CNP   alcohol swab prep pads Use to swab area of injection/yvon and vials as directed.   Marievl Barcenas APRN CNP   ORDER FOR DME Equipment being ordered: Johnson Catheters - 18 french   Marivel Barcenas APRN CNP   ACE NOT PRESCRIBED, INTENTIONAL, 1 each continuous prn ACE Inhibitor not prescribed due to Worsening renal function on ACE/ARB therapy   Marivel Barcenas APRN CNP       Medication history completed by: Vivian MominD IV Student    I agree with the student's interview information and documentation.   Jeanna Gates, Pharm.D.

## 2017-03-09 NOTE — PLAN OF CARE
Problem: Goal Outcome Summary  Goal: Goal Outcome Summary  Outcome: Improving  Alert and oriented x 3. Intermittently confused. Vital signs stable. On room air. On continuous pulse oxymetry. Denies pain. Reapportioned q 2 hours. Coccyx wound cleansed with saline,covored with meplex and spoke with wound nurse for further assessment and brooks. Johnson (16 Fr)  exchanged, balloon inflated with 10 cc. Completed brandie care. Good oral intake. Iv fluid is infusing at 125 cc/hr.Resting in bed. Call light within reach. Had a visit from her .Plan: continue care.

## 2017-03-10 LAB
ANION GAP SERPL CALCULATED.3IONS-SCNC: 8 MMOL/L (ref 3–14)
BASOPHILS # BLD AUTO: 0 10E9/L (ref 0–0.2)
BASOPHILS NFR BLD AUTO: 0.5 %
BKV DNA # SPEC NAA+PROBE: NORMAL COPIES/ML
BKV DNA SPEC NAA+PROBE-LOG#: NORMAL LOG COPIES/ML
BUN SERPL-MCNC: 35 MG/DL (ref 7–30)
CALCIUM SERPL-MCNC: 8.5 MG/DL (ref 8.5–10.1)
CHLORIDE SERPL-SCNC: 105 MMOL/L (ref 94–109)
CO2 SERPL-SCNC: 25 MMOL/L (ref 20–32)
CREAT SERPL-MCNC: 2.64 MG/DL (ref 0.52–1.04)
DIFFERENTIAL METHOD BLD: ABNORMAL
EOSINOPHIL # BLD AUTO: 0.2 10E9/L (ref 0–0.7)
EOSINOPHIL NFR BLD AUTO: 4.4 %
ERYTHROCYTE [DISTWIDTH] IN BLOOD BY AUTOMATED COUNT: 15.2 % (ref 10–15)
GFR SERPL CREATININE-BSD FRML MDRD: 18 ML/MIN/1.7M2
GLUCOSE BLDC GLUCOMTR-MCNC: 204 MG/DL (ref 70–99)
GLUCOSE BLDC GLUCOMTR-MCNC: 204 MG/DL (ref 70–99)
GLUCOSE BLDC GLUCOMTR-MCNC: 233 MG/DL (ref 70–99)
GLUCOSE BLDC GLUCOMTR-MCNC: 233 MG/DL (ref 70–99)
GLUCOSE BLDC GLUCOMTR-MCNC: 242 MG/DL (ref 70–99)
GLUCOSE SERPL-MCNC: 214 MG/DL (ref 70–99)
HBV SURFACE AG SERPL QL IA: NONREACTIVE
HCT VFR BLD AUTO: 28.3 % (ref 35–47)
HCV AB SERPL QL IA: NORMAL
HGB BLD-MCNC: 8.6 G/DL (ref 11.7–15.7)
HIV 1+2 AB+HIV1 P24 AG SERPL QL IA: NORMAL
IMM GRANULOCYTES # BLD: 0 10E9/L (ref 0–0.4)
IMM GRANULOCYTES NFR BLD: 0.2 %
LYMPHOCYTES # BLD AUTO: 1.4 10E9/L (ref 0.8–5.3)
LYMPHOCYTES NFR BLD AUTO: 31.7 %
MCH RBC QN AUTO: 28.1 PG (ref 26.5–33)
MCHC RBC AUTO-ENTMCNC: 30.4 G/DL (ref 31.5–36.5)
MCV RBC AUTO: 93 FL (ref 78–100)
MONOCYTES # BLD AUTO: 0.3 10E9/L (ref 0–1.3)
MONOCYTES NFR BLD AUTO: 6.5 %
NEUTROPHILS # BLD AUTO: 2.5 10E9/L (ref 1.6–8.3)
NEUTROPHILS NFR BLD AUTO: 56.7 %
NRBC # BLD AUTO: 0 10*3/UL
NRBC BLD AUTO-RTO: 0 /100
PLATELET # BLD AUTO: 115 10E9/L (ref 150–450)
POTASSIUM SERPL-SCNC: 4 MMOL/L (ref 3.4–5.3)
RBC # BLD AUTO: 3.06 10E12/L (ref 3.8–5.2)
SIROLIMUS BLD-MCNC: 7.5 UG/L (ref 5–15)
SODIUM SERPL-SCNC: 137 MMOL/L (ref 133–144)
SPECIMEN SOURCE: NORMAL
T PALLIDUM IGG+IGM SER QL: NEGATIVE
TACROLIMUS BLD-MCNC: 4.1 UG/L (ref 5–15)
TME LAST DOSE: ABNORMAL H
TME LAST DOSE: NORMAL H
WBC # BLD AUTO: 4.3 10E9/L (ref 4–11)

## 2017-03-10 PROCEDURE — 25000125 ZZHC RX 250: Performed by: INTERNAL MEDICINE

## 2017-03-10 PROCEDURE — 25000131 ZZH RX MED GY IP 250 OP 636 PS 637: Mod: GY | Performed by: HOSPITALIST

## 2017-03-10 PROCEDURE — 85025 COMPLETE CBC W/AUTO DIFF WBC: CPT | Performed by: STUDENT IN AN ORGANIZED HEALTH CARE EDUCATION/TRAINING PROGRAM

## 2017-03-10 PROCEDURE — 80195 ASSAY OF SIROLIMUS: CPT | Performed by: STUDENT IN AN ORGANIZED HEALTH CARE EDUCATION/TRAINING PROGRAM

## 2017-03-10 PROCEDURE — 87799 DETECT AGENT NOS DNA QUANT: CPT | Performed by: STUDENT IN AN ORGANIZED HEALTH CARE EDUCATION/TRAINING PROGRAM

## 2017-03-10 PROCEDURE — 25000131 ZZH RX MED GY IP 250 OP 636 PS 637: Mod: GY | Performed by: STUDENT IN AN ORGANIZED HEALTH CARE EDUCATION/TRAINING PROGRAM

## 2017-03-10 PROCEDURE — 83970 ASSAY OF PARATHORMONE: CPT | Performed by: INTERNAL MEDICINE

## 2017-03-10 PROCEDURE — 25000132 ZZH RX MED GY IP 250 OP 250 PS 637: Mod: GY | Performed by: STUDENT IN AN ORGANIZED HEALTH CARE EDUCATION/TRAINING PROGRAM

## 2017-03-10 PROCEDURE — 00000146 ZZHCL STATISTIC GLUCOSE BY METER IP

## 2017-03-10 PROCEDURE — 86780 TREPONEMA PALLIDUM: CPT | Performed by: STUDENT IN AN ORGANIZED HEALTH CARE EDUCATION/TRAINING PROGRAM

## 2017-03-10 PROCEDURE — 80048 BASIC METABOLIC PNL TOTAL CA: CPT | Performed by: STUDENT IN AN ORGANIZED HEALTH CARE EDUCATION/TRAINING PROGRAM

## 2017-03-10 PROCEDURE — 94660 CPAP INITIATION&MGMT: CPT

## 2017-03-10 PROCEDURE — A9270 NON-COVERED ITEM OR SERVICE: HCPCS | Mod: GY | Performed by: STUDENT IN AN ORGANIZED HEALTH CARE EDUCATION/TRAINING PROGRAM

## 2017-03-10 PROCEDURE — 12000001 ZZH R&B MED SURG/OB UMMC

## 2017-03-10 PROCEDURE — 99233 SBSQ HOSP IP/OBS HIGH 50: CPT | Mod: GC | Performed by: INTERNAL MEDICINE

## 2017-03-10 PROCEDURE — 36415 COLL VENOUS BLD VENIPUNCTURE: CPT | Performed by: STUDENT IN AN ORGANIZED HEALTH CARE EDUCATION/TRAINING PROGRAM

## 2017-03-10 PROCEDURE — 25000131 ZZH RX MED GY IP 250 OP 636 PS 637: Mod: GY | Performed by: INTERNAL MEDICINE

## 2017-03-10 PROCEDURE — 82306 VITAMIN D 25 HYDROXY: CPT | Performed by: STUDENT IN AN ORGANIZED HEALTH CARE EDUCATION/TRAINING PROGRAM

## 2017-03-10 PROCEDURE — 25000128 H RX IP 250 OP 636: Performed by: STUDENT IN AN ORGANIZED HEALTH CARE EDUCATION/TRAINING PROGRAM

## 2017-03-10 PROCEDURE — 80197 ASSAY OF TACROLIMUS: CPT | Performed by: STUDENT IN AN ORGANIZED HEALTH CARE EDUCATION/TRAINING PROGRAM

## 2017-03-10 RX ORDER — FAMOTIDINE 20 MG/1
20 TABLET, FILM COATED ORAL DAILY
Status: DISCONTINUED | OUTPATIENT
Start: 2017-03-11 | End: 2017-03-15 | Stop reason: HOSPADM

## 2017-03-10 RX ORDER — TACROLIMUS 1 MG/1
1 CAPSULE ORAL 2 TIMES DAILY
Status: DISCONTINUED | OUTPATIENT
Start: 2017-03-10 | End: 2017-03-15 | Stop reason: HOSPADM

## 2017-03-10 RX ADMIN — LABETALOL HCL 200 MG: 200 TABLET, FILM COATED ORAL at 07:54

## 2017-03-10 RX ADMIN — LABETALOL HCL 200 MG: 200 TABLET, FILM COATED ORAL at 19:56

## 2017-03-10 RX ADMIN — PRAVASTATIN SODIUM 10 MG: 10 TABLET ORAL at 07:53

## 2017-03-10 RX ADMIN — INSULIN ASPART 2 UNITS: 100 INJECTION, SOLUTION INTRAVENOUS; SUBCUTANEOUS at 13:18

## 2017-03-10 RX ADMIN — INSULIN GLARGINE 10 UNITS: 100 INJECTION, SOLUTION SUBCUTANEOUS at 21:56

## 2017-03-10 RX ADMIN — PREDNISONE 2.5 MG: 2.5 TABLET ORAL at 07:53

## 2017-03-10 RX ADMIN — HEPARIN SODIUM 5000 UNITS: 5000 INJECTION, SOLUTION INTRAVENOUS; SUBCUTANEOUS at 19:56

## 2017-03-10 RX ADMIN — HYDRALAZINE HYDROCHLORIDE 10 MG: 20 INJECTION INTRAMUSCULAR; INTRAVENOUS at 21:55

## 2017-03-10 RX ADMIN — INSULIN ASPART 2 UNITS: 100 INJECTION, SOLUTION INTRAVENOUS; SUBCUTANEOUS at 18:32

## 2017-03-10 RX ADMIN — TACROLIMUS 2 MG: 1 CAPSULE ORAL at 07:53

## 2017-03-10 RX ADMIN — CLONIDINE HYDROCHLORIDE 0.1 MG: 0.1 TABLET ORAL at 19:56

## 2017-03-10 RX ADMIN — INSULIN ASPART 2 UNITS: 100 INJECTION, SOLUTION INTRAVENOUS; SUBCUTANEOUS at 08:18

## 2017-03-10 RX ADMIN — GABAPENTIN 300 MG: 300 CAPSULE ORAL at 07:54

## 2017-03-10 RX ADMIN — TACROLIMUS 1 MG: 1 CAPSULE ORAL at 19:56

## 2017-03-10 RX ADMIN — ACETAMINOPHEN 650 MG: 325 TABLET, FILM COATED ORAL at 12:21

## 2017-03-10 RX ADMIN — FAMOTIDINE 20 MG: 20 TABLET ORAL at 07:54

## 2017-03-10 RX ADMIN — ACETAMINOPHEN 650 MG: 325 TABLET, FILM COATED ORAL at 00:40

## 2017-03-10 RX ADMIN — GABAPENTIN 300 MG: 300 CAPSULE ORAL at 19:56

## 2017-03-10 RX ADMIN — LEVOTHYROXINE SODIUM 50 MCG: 50 TABLET ORAL at 07:54

## 2017-03-10 RX ADMIN — CLONIDINE HYDROCHLORIDE 0.1 MG: 0.1 TABLET ORAL at 07:54

## 2017-03-10 RX ADMIN — HEPARIN SODIUM 5000 UNITS: 5000 INJECTION, SOLUTION INTRAVENOUS; SUBCUTANEOUS at 07:55

## 2017-03-10 RX ADMIN — SIROLIMUS 1.5 MG: 1 TABLET, SUGAR COATED ORAL at 07:53

## 2017-03-10 RX ADMIN — CEFTRIAXONE 2 G: 2 INJECTION, POWDER, FOR SOLUTION INTRAMUSCULAR; INTRAVENOUS at 19:55

## 2017-03-10 NOTE — PROGRESS NOTES
"  Nephrology Progress Note  03/10/2017         Assessment & Recommendations:   Gem Jade is a 71 year old F with T2 DM , diabetic nephropathy, now s/p LDKT in 10/27/1999, with baseline Cr in 0.8-1, on sirolimus/tacrolimus/prednisone, admitted with AMS, found with ROB with a Cr of 3.3, and UA consistent with UTI. Uc postivive for enterococcus.     1. LDKT: with ROB, mixt picture: prerenal and UTI.  U/S with resolved hydronephrosis. No tacrolimus level. Cr improving   -send Up/UCr , ROB with edema ( order in ) , GN?   2. Immunosuppression:  F/u on  tacrolimus and sirolimus 12 hours level tomorrow morining.Decrease her Tacrolimus to 1 mg BID as we start her on Fluconazole.    3. BP/ volume status: elevated BP, possible volume related. On Labetalol 200 mg BID and Clonidine 0.1 mg BID. Clonidine can give AMS in old patient. We would like to win her off Clonidine over time., but will hold on this for now. Stop fluids. Encoreage po intake.   4. CKD- MB: hypercalcemia, with low Vit D. Please send PHT, Vit D  5.Anemia: Hg stable  6. UTI: with chronic Johnson, now with Uc positive for enterococcus and yest. Treated with Ceftriaxone and Fluconazole. Transplant ID to be consulted. Urology consult regarding her indweling Johnson. Patient is bed bound, unclear reason. Please change the Johnson after 3 days of treatment with Fluconazole.     Recommendations were communicated to primary team via this note.     Seen and discussed with Dr. Aida Malhotra MD   [phone]    Interval History :   In the last 24 hours Gem Jade has been feeling better.   Review of Systems:   4 pt ROS reviewed alone and are all negative     Physical Exam:   I/O last 3 completed shifts:  In: 1170 [P.O.:1170]  Out: 2630 [Urine:2630]   /51 (BP Location: Right arm)  Pulse 68  Temp 97.3  F (36.3  C) (Oral)  Resp 18  Ht 1.702 m (5' 7\")  Wt 88.1 kg (194 lb 3.2 oz)  SpO2 100%  BMI 30.42 kg/m2     GENERAL APPEARANCE: lying in bed   PULM: " lungs clear to auscultation,  bilaterally, equal air movement, no clubbing  CV: regular rhythm, normal rate, no rub     -JVP no      -edema trace lower extremity edema    GI: soft, non tender, non distended, bowel sounds are  Normal   NEURO:  No  asterixis       Labs:   All labs reviewed by me  Electrolytes/Renal -   Recent Labs   Lab Test  03/10/17   0731  03/09/17   0750  03/08/17   1243   02/17/17   0644  02/16/17 2303 09/05/16 0617 09/04/16   0815   NA  137  141  135   < >  135  131*   < >  135  136   POTASSIUM  4.0  4.1  4.6   < >  4.4   --    < >  4.0  4.0   CHLORIDE  105  107  98   < >  97   --    < >  103  104   CO2  25  26  28   < >  25   --    < >  21  22   BUN  35*  39*  42*   < >  40*   --    < >  16  12   CR  2.64*  2.94*  3.37*   < >  1.05*   --    < >  0.54  0.58   GLC  214*  138*  61*   < >  354*   --    < >  177*  178*   ROGER  8.5  8.9  10.2*   < >  11.2*   --    < >  8.4*  8.7   MAG   --    --    --    --    --   2.0   --   2.2  1.4*   PHOS   --   3.2   --    --   3.2  2.9   --    --    --     < > = values in this interval not displayed.       CBC -   Recent Labs   Lab Test  03/10/17   0731 03/09/17 0750 03/08/17 2005 03/08/17 1243   WBC  4.3  3.5*   --   5.0   HGB  8.6*  9.4*   --   10.9*   PLT  115*  107*  128*  137*       LFTs -   Recent Labs   Lab Test  03/09/17   0750 03/08/17 1243 02/16/17 2007 09/05/16   0617   ALKPHOS   --   87  80  53   BILITOTAL   --   0.3  0.5  0.9   ALT   --   <6  14  10   AST   --   13  9  15   PROTTOTAL   --   7.9  8.2  7.3   ALBUMIN  2.7*  3.0*  2.8*  2.6*       Iron Panel -   Recent Labs   Lab Test  02/17/17   2358  09/04/16   0815  08/07/16   0750   IRON  50  124  12*   IRONSAT  29  97*  7*   GIANA  1069*  2676*  645*         Imaging:  All imaging studies reviewed by me.     Current Medications:    tacrolimus  2 mg Oral BID     gabapentin  300 mg Oral BID     cloNIDine  0.1 mg Oral BID     famotidine  20 mg Oral BID     insulin glargine  10  Units Subcutaneous At Bedtime     labetalol  200 mg Oral BID     levothyroxine  50 mcg Oral Daily     predniSONE  2.5 mg Oral Daily     pravastatin  10 mg Oral Daily     heparin  5,000 Units Subcutaneous Q12H     insulin aspart  1-7 Units Subcutaneous TID AC     insulin aspart  1-5 Units Subcutaneous At Bedtime     cefTRIAXone  2 g Intravenous Q24H     sirolimus  1.5 mg Oral Daily        Rochelle Malhotra MD     Patient was seen and evaluated by me, Matt Parra MD. I have reviewed the note and agree with the the plan of care as documented by the fellow.

## 2017-03-10 NOTE — PROGRESS NOTES
Medicine Daily Progress Note   03/10/2017    Gem Jade MRN# [id number]   Age: 71 year old  Sex: female YOB: 1945  Date of Admissions: 3/8/2017          Assessment and Plan:   72 y/o female with PMH significant for kidney transplant (1999), complex UTIs, morbid obesity, mobility issues, fatigue, DM-II, chronic pain, hypothyroidism, HTN, and HLD who was admitted for acute kidney injury and altered mental status    # ROB in setting of UTI vs transplant rejection  Creatinine 3.49 on 3/6/2017, trending down to 2.64 today, up from baseline of 0.8 - 1.0.   - Renal Transplant ultrasound performed on admission, noting resolved hydronephrosis but mildly increase resistive indices in arcuate arteries, concerning for rejection  - Consult placed to transplant nephrology, who believe that this is not rejection but could be infectious process. Recommended viral work-up and tacrolimus/sirolimus level.  - Transplant Nephrology consulted, who believe that this is not rejection but could be infectious process, recommended consulting Transplant Infectious Disease. Will pursue consult after results from initial workup.  - Tacrolimus 4.1 (decreased), sirolimus 7.5. Will continue to consult Transplant Nephrology.  - BK virus quant negative. HIV screen, EBV quant, CMV quant, HepC antigen, and HepB surface antigen pending.     # Altered mental status in setting of UTI vs metabolic encephalopathy  Concern raised due to unresolved AMS since before previous visit. Suspicious for systemic causes (endocrine vs vitamin deficiency) due to longstanding AMS.  - Folate, B12, CK-Total, TSH = 1.20,  T4 = 0.72 (decreased), B12 = 1402 (increased), Folate = 5.2 (decreased), CK-Total = 36, RPR = negative  - PTH and 25 Hydroxyvitamin D2/D3 ordered.   - Incentive spirometer ordered  - Treatment of UTI to rule out infectious etiology (see below)  - Continue trending BMP to rule out electrolyte disturbances  - Gabapentin lowered from  900mg BID to 300mg BID. Still concerning for AMS.    # Sacral ulcer  Seen on previous admit in February 2017.   - Appreciate dressing and recs from Wound Care Nurse     # Urinary tract infection  Cloudy urine identified upon admission. Urine today is pale yellow in bag. Concerning for UTI due to previous presence of WBCs and leukocyte esterase.   - Chronic indwelling Johnson exchanged yesterday.  - Urine culture in process  - Blood cultures (3/8/17) - NGTD  - Continue ceftriaxone 2g q24h    # Hypertension  - Continue home clonidine and labetalol  - Hydralazine PRN    # HLD  - Continue home pravastatin     # Pain:   - Continue acetaminophen PRN  - Gabapentin lowered from 900mg BID to 300mg BID. Still concerning for AMS.    # Diarrhea:   - Continue lomotil  - Continue Imodium PRN    # Hypothyroid  - Continue Synthroid  - Will monitor TSH/T4 levels    # Diabetes:   - Continue Lantus 10 units  - Medium SSI  - POC glucose checks before meals and bedtime    Summary  FEN: Renal Diet  Prophylaxis  - Isolation: Contact  - Lung: CPAP at night  - DVT: SubQ heparin  - GI: Pepcid  Code Status: FULL    Disposition: Pending workup for altered mental status / infection. Discussed her improvement during her admission with her  Keshawn.     Patient was seen and discussed with Dr. Roland Richard DDS  Oral & Maxillofacial Surgery Resident  PGY-1  [phone]     Subjective (interval history):   Mrs. Jade had an uneventful evening. She states that she slept well and has had a good appetite. She denies trouble breathing, headache, dysphagia, or pain associated with her indwelling catheter. She states that she feels her mental status is back to baseline, and denies having a hallucination since yesterday. She also feels like her fatigue has improved. She states that she is interested in physical therapy but that is difficult in the setting of her severe vertigo.     Physical Exam:   Vitals:  Temp:  [97.3  F (36.3  C)-98.9  F  (37.2  C)] 97.3  F (36.3  C)  Pulse:  [64-75] 68  Heart Rate:  [68-75] 71  Resp:  [16-18] 18  BP: (127-207)/(51-85) 168/66  SpO2:  [97 %-100 %] 98 %    Temp  Av.6  F (36.4  C)  Min: 96.2  F (35.7  C)  Max: 98.4  F (36.9  C)      Pulse  Av.5  Min: 57  Max: 87 Resp  Av.5  Min: 8  Max: 30  SpO2  Av.3 %  Min: 87 %  Max: 100 %  FiO2 (%)  Av %  Min: 40 %  Max: 40 %       Wt Readings from Last 4 Encounters:   17 88.1 kg (194 lb 3.2 oz)   17 93.1 kg (205 lb 4.8 oz)   16 91.4 kg (201 lb 8 oz)   16 90.7 kg (200 lb)       Intake/Output Summary (Last 24 hours) at 17 1311  Last data filed at 17 1119   Gross per 24 hour   Intake          2101.67 ml   Output             2330 ml   Net          -228.33 ml       General: Laying in bed, in no acute distress. Sleeping well upon entry.   Skin: Sacral wound noted, confined to epidermis. No other concerning rash, jaundice or ecchymoses.   HEENT: Atraumatic, normocephalic. Pupils equally round and reactive to light, EOMI. Anicteric sclera. No swelling or masses.   Respiratory: Non-labored breathing, lungs clear to auscultation bilaterally.  Cardiovascular: Regular rate and rhythm, normal S1 and S2   Gastrointestinal: Normoactive bowel sounds. Abdomen soft, non-distended, and non-tender.    : Johnson in place, urine pale yellow in collection bag  Extremities: Lower extremity edema. Upper extremities have equal strength and mobility.   Neurologic: A&O x 3 (oriented to self, place, and president, orientation to time improved from yesterday), speech normal         Laboratory Data:   ROUTINE ICU LABS (Last four results)  CMP    Recent Labs  Lab 03/10/17  0731 17  0750 17  1243 17  1200    141 135 135   POTASSIUM 4.0 4.1 4.6 4.5   CHLORIDE 105 107 98 100   CO2 25 26 28 29   ANIONGAP 8 8 8 6   * 138* 61* 93   BUN 35* 39* 42* 43*   CR 2.64* 2.94* 3.37* 3.49*   GFRESTIMATED 18* 16* 13* 13*   GFRESTBLACK 22*  19* 16* 16*   ROGER 8.5 8.9 10.2* 11.2*   PHOS  --  3.2  --   --    PROTTOTAL  --   --  7.9  --    ALBUMIN  --  2.7* 3.0*  --    BILITOTAL  --   --  0.3  --    ALKPHOS  --   --  87  --    AST  --   --  13  --    ALT  --   --  <6  --      CBC    Recent Labs  Lab 03/10/17  0731 03/09/17  0750 03/08/17 2005 03/08/17  1243 03/06/17  1200   WBC 4.3 3.5*  --  5.0 3.9*   RBC 3.06* 3.34*  --  3.75* 3.46*   HGB 8.6* 9.4*  --  10.9* 10.1*   HCT 28.3* 30.6*  --  34.5* 32.2*   MCV 93 92  --  92 93   MCH 28.1 28.1  --  29.1 29.2   MCHC 30.4* 30.7*  --  31.6 31.4*   RDW 15.2* 14.9  --  15.1* 14.5   * 107* 128* 137* 142*     INR    Recent Labs  Lab 03/08/17  1243   INR 1.01       Culture data:  Urine culture (3/3/2017) - Yeast and Enterococcus faecalis isolated. Sensitivities pending.  Blood cultures (3/3/2017) - NGTD

## 2017-03-10 NOTE — PLAN OF CARE
"/66 (BP Location: Left arm)  Pulse 68  Temp 97.3  F (36.3  C) (Oral)  Resp 18  Ht 1.702 m (5' 7\")  Wt 88.1 kg (194 lb 3.2 oz)  SpO2 98%  BMI 30.42 kg/m2    Here w/ROB and UTI, hx of kidney txp. Pt a/o x4, elevated BP, other vitals stable on RA. Johnson in place with good output, PO intake encouraged. Pt c/o lower back pain, managed with tylenol. BG check ac, in 200's, managed with insulin per orders.  updated by nursing. Will continue to monitor and follow POC.   "

## 2017-03-10 NOTE — PLAN OF CARE
"Problem: Goal Outcome Summary  Goal: Goal Outcome Summary  Outcome: No Change  /53 (BP Location: Left arm)  Pulse 75  Temp 98.9  F (37.2  C) (Oral)  Resp 18  Ht 1.702 m (5' 7\")  Wt 88.1 kg (194 lb 3.2 oz)  SpO2 98%  BMI 30.42 kg/m2  Pt had several episodes of high BP. MD aware. PRN BP meds given after scheduled meds. BP is stable at this time.  aware and updated multiple times this evening. Continuous fluids were D/C.  brought in a mask for her cpap. Pt's brandie area was cleaned by RNs. Small scratch noted. We will monitor this.   We will monitor her oxygen sats with this. Pt was repo every two hours. Bs covered per mar.   Shira Begum RN      "

## 2017-03-10 NOTE — CONSULTS
Nephrology Initial Consult  March 9, 2017      Gem Jade MRN:[id number] YOB: 1945  Date of Admission:3/8/2017  Primary care provider: Marivel Barcenas  Requesting physician: Roland Reese MD    ASSESSMENT AND RECOMMENDATIONS:   1. LDKT: with ROB, prerenal most likely. U/S with resolved hydronephrosis. No tacrolimus level.   -send Up/UCr , ROB with edema ( order in )   2. Immunosuppression; will send tacrolimus and sirolimus 12 hours level tomorrow morining. No changes for now.   3. BP/ volume status: will hold on fluids as patient has good po intake, and lower extremity edema. Reasess in the afternoon for the need of iv fluids. Restart Clonidine.   4. CKD- MB: hypercalcemia, with low Vit D. Please send PHT, Vit D  5.Anemia:  Hg stable  6. UTI: Please consult Transplant ID regarding her persistent candida infection and recurrent UTI. Will f/u on Uc.     Recommendations were communicated to primary team by phone     Seen and discussed with Dr. Clara Malhotra MD   [phone]      REASON FOR CONSULT: LDKT now with ROB     HISTORY OF PRESENT ILLNESS:  Gem Jade is a 71 year old F with T2 DM , diabetic  nephropathy, now s/p LDKT in 10/27/1999, with baseline Cr in 0.8-1, on sirolimus/tacrolimus/prednisone, admitted with AMS, found with ROB with a Cr of 3.3, and UA consistent with UTI. Patient with chronic indewelling catheter. She was recent in the hospital form 2/16/17-2/20/17 with AMS, when she was treated for UTI, with Ertapenem and D/C on Ciprofloxacin finished on 3/2/17. Per note her Uc grew Gr negative rods on 2/13/17, then E coli and Candida. On 2/16/17. Her Uc remained positive for Candida.During that admission her U/S showed mild hydronephrosis, and an non-obstructive stone. Her hydronephrosis resolved now. She got iv fluids, and her Cr improved with it. Tacrolimus level will be sent.     Her other medical problems are:morbid obesity, severe vertigo, generalized  weakness, chronic pain, bed bound .   PAST MEDICAL HISTORY:  Reviewed with patient on 03/09/2017     Past Medical History   Diagnosis Date     Background diabetic retinopathy(362.01)      extensive diabetic retinopathy, s/p multiple laser treatments     Diabetic gastroparesis (H)      followed by [facility] (Dr. Chne)     Diarrhea      Dizziness and giddiness      Hyponatremia 12/16/11     Na 121     Kidney replaced by transplant      Mixed hyperlipidemia      Obesity      Osteopenia 11/07     per DEXA     Other and unspecified hyperlipidemia      Other pulmonary embolism and infarction 3/03     Bilateral pulmonary emboli post op after knee replacement     Polyneuropathy in diabetes(357.2)      Primary localized osteoarthrosis, lower leg      Pyelonephritis, unspecified      Type 2 diabetes mellitus with complications (H)      Urinary tract infection, site not specified      Chronic UTIs     UTI (urinary tract infection) due to urinary indwelling catheter (H)        Past Surgical History   Procedure Laterality Date     C nonspecific procedure  10/99     kidney transplant     C nonspecific procedure  8/00     MRI head, lumbar spine, pelvis     Hc left heart catheterization  1999     Cardiac Cath, Left Heart, Negative  (@ [facility])     C total knee arthroplasty  3/03     Knee Replacement, Total right, post op PE     Hc remv cataract extracap,insert lens  8/04     bilateral cataract repaired, left eye      Hc left heart catheterization  4/05     normal coronary angiogram at [facility], had mild troponin rise (0.71)     Cholecystectomy          MEDICATIONS:  PTA Meds  Prior to Admission medications    Medication Sig Last Dose Taking? Auth Provider   sirolimus (RAPAMUNE - GENERIC EQUIVALENT) 0.5 MG tablet Take 1.5 mg by mouth daily 3/8/2017 at Unknown time Yes Unknown, Entered By History   diphenoxylate-atropine (LOMOTIL) 2.5-0.025 MG per tablet Take 1 tablet by mouth 4 times daily as needed for diarrhea 3/7/2017 at pm Yes  Marivel Barcenas APRN CNP   TraMADol HCl 50 MG TBDP Take 50 mg by mouth 3 times daily as needed  Patient taking differently: Take 50 mg by mouth 2 times daily  3/7/2017 at Unknown time Yes Marivel Barcenas APRN CNP   insulin aspart (NOVOLOG FLEXPEN) 100 UNIT/ML injection Inject 7 units before meals BID. At bedtime use following sliding scale:   150-200 5u  201-250 7u  251-300 9u  301-350 11u  351+ 13u 3/7/2017 at pm Yes    OXcarbazepine (TRILEPTAL) 300 MG tablet Take 1 tablet (300 mg) by mouth 2 times daily 3/8/2017 at Unknown time Yes Marivel Barcenas APRN CNP   levothyroxine (SYNTHROID) 50 MCG tablet Take 1 tablet (50 mcg) by mouth daily 3/7/2017 at Unknown time Yes Marivel Barcenas APRN CNP   cloNIDine (CATAPRES) 0.1 MG tablet Take 1 tablet (0.1 mg) by mouth 2 times daily 3/7/2017 at Unknown Yes Marivel Barcenas APRN CNP   gabapentin (NEURONTIN) 300 MG capsule Take 3 tabs 2x daily 3/7/2017 at Unknown time Yes Marivel Barcenas APRN CNP   tacrolimus (PROGRAF - GENERIC EQUIVALENT) 1 MG capsule Take 2 capsules (2 mg) by mouth 2 times daily 3/7/2017 at 0800 Yes Ross Elizabeth MD   insulin glargine (LANTUS SOLOSTAR) 100 UNIT/ML PEN Inject 14 units under the skin daily. 3/7/2017 at pm Yes Marivel Barcenas APRN CNP   loratadine (CLARITIN) 10 MG tablet Take 1 tablet (10 mg) by mouth daily 3/7/2017 at Unknown time Yes Marivel Barcenas APRN CNP   pravastatin (PRAVACHOL) 10 MG tablet Take 1 tablet (10 mg) by mouth daily 3/7/2017 at Unknown time Yes Marivel Barcenas APRN CNP   Lactobacillus Rhamnosus, GG, (CULTURELL) capsule Take 1 capsule by mouth 2 times daily 3/7/2017 at Unknown time Yes She Siddiqui MD   predniSONE (DELTASONE) 2.5 MG tablet Take 1 tablet (2.5 mg) by mouth daily 3/7/2017 at Unknown time Yes Monico Heck MD   Cranberry 400 MG TABS Take 400 mg by mouth 2 times daily 3/7/2017 at Unknown Yes  "Unknown, Entered By History   phenazopyridine (PYRIDIUM) 100 MG tablet Take 1 tablet (100 mg) by mouth 3 times daily as needed for irritation Per patient's , the patient is probably still using this medication. at Last dose unknown Yes Marivel Barcenas APRN CNP   famotidine (PEPCID) 20 MG tablet Take 1 tablet (20 mg) by mouth 2 times daily 3/7/2017 at Unknown time Yes Marivel Barcenas APRN CNP   ASPIRIN PO Take 81 mg by mouth daily.   3/7/2017 at am Yes Unknown, Entered By History   ACETAMINOPHEN PO Take 325-650 mg by mouth every 4 hours as needed. Past Month at Last used about 3 weeks ago per patient's  for UTI Yes Unknown, Entered By History   blood glucose monitoring (ACCU-CHEK COMPACT PLUS) test strip Use to test blood sugar 3 times daily or as directed.   Marivel Barcenas APRN CNP   blood glucose monitoring (ACCU-CHEK COMPACT CARE KIT) meter device kit Use to test blood sugar 3 times daily or as directed.   Marivel Barcenas APRN CNP   labetalol (NORMODYNE) 200 MG tablet Take 1 tablet (200 mg) by mouth 2 times daily   Marivel Barcenas APRN CNP   cyanocobalamin (VITAMIN B12) 1000 MCG/ML injection Inject 1 mL (1,000 mcg) into the muscle every 30 days Left message for [facility] to speak with nurse regarding last administration. at Unknown time  Lorenzo Choudhury MD   syringe/needle, disp, (B-D SYRINGE/NEEDLE) 22G X 1-1/2\" 3 ML MISC Inject 1 Syringe into the muscle every 30 days   Lorenzo Choudhury MD   order for DME Equipment being ordered: Ahuja FX CPAP interface (nasal pillows), size XS.   Lorenzo Choudhury MD   glucagon (GLUCAGON EMERGENCY) 1 MG injection Inject 1 mg into the muscle once Patient has medication at home in case of emergency at Per , hasn't used in 2 years  Barb Gordon APRN CNP   clotrimazole (LOTRIMIN) 1 % cream Apply topically 2 times daily Apply to buttocks twice a day until rash resolved. Taken as " needed.  did not know last dose.  Barb Gordon APRN CNP   insulin pen needle 30G X 8 MM Use as directed with Marivel Westfall APRN CNP   ondansetron (ZOFRAN ODT) 4 MG disintegrating tablet Take 1-2 tablets (4-8 mg) by mouth every 8 hours as needed for nausea Unknown time; used as needed  Marivel Barcenas APRN CNP   simethicone (MYLICON) 125 MG CHEW Take 1 tablet (125 mg) by mouth as needed for intestinal gas Unknown time; taken as needed     Nystatin (NYSTOP) 415463 UNIT/GM POWD Externally apply 1 g topically 3 times daily as needed Unknown time; used as needed  Marivel Barcenas APRN CNP   order for DME Equipment being ordered: Blue Chux  Please send to [facility]   Marivel Barcenas APRN CNP   alcohol swab prep pads Use to swab area of injection/yvon and vials as directed.   Marivel Barcenas APRN CNP   ORDER FOR DME Equipment being ordered: Johnson Catheters - 18 french   Marivel Barcenas APRN CNP   ACE NOT PRESCRIBED, INTENTIONAL, 1 each continuous prn ACE Inhibitor not prescribed due to Worsening renal function on ACE/ARB therapy   Marivel Barcenas APRN CNP      Current Meds    tacrolimus  2 mg Oral BID     cloNIDine  0.1 mg Oral BID     famotidine  20 mg Oral BID     insulin glargine  10 Units Subcutaneous At Bedtime     labetalol  200 mg Oral BID     levothyroxine  50 mcg Oral Daily     predniSONE  2.5 mg Oral Daily     pravastatin  10 mg Oral Daily     heparin  5,000 Units Subcutaneous Q12H     insulin aspart  1-7 Units Subcutaneous TID AC     insulin aspart  1-5 Units Subcutaneous At Bedtime     cefTRIAXone  2 g Intravenous Q24H     sirolimus  1.5 mg Oral Daily     Infusion Meds       ALLERGIES:    Allergies   Allergen Reactions     Bactrim Ds [Sulfamethoxazole W/Trimethoprim]      Creatinine level increased     Atorvastatin Calcium      myalgias, muscle aches     Codeine Nausea and Vomiting     Darvocet  "[Propoxyphene N-Apap] Nausea and Vomiting     Hydromorphone      Levofloxacin Diarrhea     Morphine      Nausea and vomiting     Niaspan [Niacin] GI Disturbance     Flushing even at low dose, GI upset     Percocet [Oxycodone-Acetaminophen]      Vomiting after taking med couple of times     Vicodin [Hydrocodone-Acetaminophen] Nausea and Vomiting       REVIEW OF SYSTEMS:  A 10 point review of systems was negative except as noted above.    SOCIAL HISTORY:   Social History     Social History     Marital status:      Spouse name: N/A     Number of children: N/A     Years of education: N/A     Occupational History     Not on file.     Social History Main Topics     Smoking status: Former Smoker     Years: 17.00     Quit date: 1987     Smokeless tobacco: Not on file     Alcohol use No     Drug use: No     Sexual activity: Yes     Partners: Male     Other Topics Concern     Not on file     Social History Narrative     Reviewed with patient     FAMILY MEDICAL HISTORY:   Family History   Problem Relation Age of Onset     DIABETES Mother      DIABETES Brother      Hypertension Father      [id number] pneumonia     HEART DISEASE Father      Reviewed with patient     PHYSICAL EXAM:   Temp  Av.3  F (36.8  C)  Min: 96  F (35.6  C)  Max: 103  F (39.4  C)      Pulse  Av.1  Min: 57  Max: 119 Resp  Avg: 15.2  Min: 6  Max: 30  SpO2  Av.6 %  Min: 39 %  Max: 100 %  FiO2 (%)  Av.3 %  Min: 21 %  Max: 40 %       BP (!) 204/85 (BP Location: Right arm)  Pulse 75  Temp 98.1  F (36.7  C) (Oral)  Resp 17  Ht 1.702 m (5' 7\")  Wt 88.1 kg (194 lb 3.2 oz)  SpO2 99%  BMI 30.42 kg/m2   Date 17 0700 - 03/10/17 0659   Shift 4096-6382 7346-6077 1369-9469 24 Hour Total   I  N  T  A  K  E   P.O. 720   720    Shift Total  (mL/kg) 720  (8.17)   720  (8.17)   O  U  T  P  U  T   Urine 1130   1130    Shift Total  (mL/kg) 1130  (12.83)   1130  (12.83)   Weight (kg) 88.09 88.09 88.09 88.09        Admit Weight: 88.1 kg (194 lb " 3.2 oz)     GENERAL APPEARANCE: no acute distress,  Awake, lying in bed   Pulmonary: lungs clear to auscultation with equal breath sounds bilaterally, no clubbing  CV: regular rhythm, normal rate, no rub   - JVP no    - Edema trace lower extremity edema  GI: soft, nontender, normal bowel sounds, no HSM   :  Johnson in, clear urine   SKIN: no rash, warm, dry, no cyanosis  NEURO: mentation intact and speech normal    LABS:   CMP  Recent Labs  Lab 03/09/17  0750 03/08/17  1243 03/06/17  1200    135 135   POTASSIUM 4.1 4.6 4.5   CHLORIDE 107 98 100   CO2 26 28 29   ANIONGAP 8 8 6   * 61* 93   BUN 39* 42* 43*   CR 2.94* 3.37* 3.49*   GFRESTIMATED 16* 13* 13*   GFRESTBLACK 19* 16* 16*   ROGER 8.9 10.2* 11.2*   PHOS 3.2  --   --    PROTTOTAL  --  7.9  --    ALBUMIN 2.7* 3.0*  --    BILITOTAL  --  0.3  --    ALKPHOS  --  87  --    AST  --  13  --    ALT  --  <6  --      CBC  Recent Labs  Lab 03/09/17  0750 03/08/17  2005 03/08/17  1243 03/06/17  1200   HGB 9.4*  --  10.9* 10.1*   WBC 3.5*  --  5.0 3.9*   RBC 3.34*  --  3.75* 3.46*   HCT 30.6*  --  34.5* 32.2*   MCV 92  --  92 93   MCH 28.1  --  29.1 29.2   MCHC 30.7*  --  31.6 31.4*   RDW 14.9  --  15.1* 14.5   * 128* 137* 142*     INR  Recent Labs  Lab 03/08/17  1243   INR 1.01     ABGNo lab results found in last 7 days.   URINE STUDIES  Recent Labs   Lab Test  03/08/17   1300  03/03/17   1800  02/16/17   2036  02/13/17   1845   05/21/10   1553   COLOR  Yellow  Straw  Yellow  Yellow   < >  Yellow   APPEARANCE  Cloudy  Slightly Cloudy  Cloudy  Cloudy   < >  Clear   URINEGLC  Negative  50*  300*  >1000*   < >  Negative   URINEBILI  Negative  Negative  Negative  Negative   < >  Negative   URINEKETONE  Negative  Negative  10*  40*   < >  Negative   SG  1.015  1.006  >1.050*  1.010   < >  1.015   UBLD  Moderate*  Small*  Moderate*  Moderate*   < >  Moderate*   URINEPH  6.5  8.0*  6.5  6.0   < >  6.0   PROTEIN  300*  100*  300*  300*   < >  30*   UROBILINOGEN    --    --    --    --    --   0.2   NITRITE  Negative  Negative  Positive*  Positive*   < >  Positive*   LEUKEST  Large*  Large*  Large*  Large*   < >  Moderate*   RBCU  154*  46*  >182*  75*   < >  *   WBCU  >182*  43*  >182*  >182*   < >  *    < > = values in this interval not displayed.     Recent Labs   Lab Test  10/07/11   1230  09/27/11   1600   [facility]PG  Test canceled by PCU/Clinic  Charge credited PER RADHA FALCON  0.07     PTH  Recent Labs   Lab Test  02/16/17   2303  10/07/11   1215  09/27/11   1600   PTHI  9*  Test canceled by PCU/Clinic  Charge credited PER RADHA FALCON  53     IRON STUDIES  Recent Labs   Lab Test  02/17/17   2358  09/04/16   0815  08/07/16   0750  08/09/12   0819  07/14/12   0720 05/08/12  10/07/11   1215  09/27/11   1600   IRON  50  124  12*  68  154  68  Test canceled by PCU/Clinic  Charge credited CANCELED PER RADHA ROBERTSENEY CORRECTED ON 10/07 AT 1459: PREVIOUSLY REPORTED AS 69  26*   FEB  170*  128*  166*  188*  169*   --   Test canceled by PCU/Clinic  Charge credited CANCELED PER RADHA ROBERTSENEY CORRECTED ON 10/07 AT 1459: PREVIOUSLY REPORTED   227*   IRONSAT  29  97*  7*  36  91*   --   Test canceled by PCU/Clinic  Charge credited CANCELED PER RADHA ROBERTSENEY CORRECTED ON 10/07 AT 1459: PREVIOUSLY REPORTED AS 30  12*   GIANA  1069*  2676*  645*   --   444*  630?   --   72       Rochelle Malhotra MD    Attestation:  This patient has been seen and evaluated by me, Ross Elizabeth MD.  I have reviewed the note and agree with plan of care as documented by the fellow.

## 2017-03-11 ENCOUNTER — APPOINTMENT (OUTPATIENT)
Dept: OCCUPATIONAL THERAPY | Facility: CLINIC | Age: 72
DRG: 698 | End: 2017-03-11
Attending: INTERNAL MEDICINE
Payer: MEDICARE

## 2017-03-11 ENCOUNTER — APPOINTMENT (OUTPATIENT)
Dept: PHYSICAL THERAPY | Facility: CLINIC | Age: 72
DRG: 698 | End: 2017-03-11
Attending: INTERNAL MEDICINE
Payer: MEDICARE

## 2017-03-11 LAB
ANION GAP SERPL CALCULATED.3IONS-SCNC: 8 MMOL/L (ref 3–14)
BACTERIA SPEC CULT: ABNORMAL
BUN SERPL-MCNC: 33 MG/DL (ref 7–30)
CALCIUM SERPL-MCNC: 8.1 MG/DL (ref 8.5–10.1)
CHLORIDE SERPL-SCNC: 104 MMOL/L (ref 94–109)
CMV DNA SPEC NAA+PROBE-ACNC: NORMAL [IU]/ML
CMV DNA SPEC NAA+PROBE-LOG#: NORMAL {LOG_IU}/ML
CO2 SERPL-SCNC: 24 MMOL/L (ref 20–32)
CREAT SERPL-MCNC: 2.4 MG/DL (ref 0.52–1.04)
ERYTHROCYTE [DISTWIDTH] IN BLOOD BY AUTOMATED COUNT: 15.1 % (ref 10–15)
GFR SERPL CREATININE-BSD FRML MDRD: 20 ML/MIN/1.7M2
GLUCOSE BLDC GLUCOMTR-MCNC: 144 MG/DL (ref 70–99)
GLUCOSE BLDC GLUCOMTR-MCNC: 147 MG/DL (ref 70–99)
GLUCOSE BLDC GLUCOMTR-MCNC: 213 MG/DL (ref 70–99)
GLUCOSE BLDC GLUCOMTR-MCNC: 261 MG/DL (ref 70–99)
GLUCOSE BLDC GLUCOMTR-MCNC: 261 MG/DL (ref 70–99)
GLUCOSE SERPL-MCNC: 217 MG/DL (ref 70–99)
HCT VFR BLD AUTO: 29.1 % (ref 35–47)
HGB BLD-MCNC: 8.9 G/DL (ref 11.7–15.7)
Lab: ABNORMAL
MCH RBC QN AUTO: 28.1 PG (ref 26.5–33)
MCHC RBC AUTO-ENTMCNC: 30.6 G/DL (ref 31.5–36.5)
MCV RBC AUTO: 92 FL (ref 78–100)
MICRO REPORT STATUS: ABNORMAL
MICROORGANISM SPEC CULT: ABNORMAL
PLATELET # BLD AUTO: 98 10E9/L (ref 150–450)
POTASSIUM SERPL-SCNC: 3.8 MMOL/L (ref 3.4–5.3)
PTH-INTACT SERPL-MCNC: 334 PG/ML (ref 12–72)
RBC # BLD AUTO: 3.17 10E12/L (ref 3.8–5.2)
SIROLIMUS BLD-MCNC: 2.8 UG/L (ref 5–15)
SODIUM SERPL-SCNC: 136 MMOL/L (ref 133–144)
SPECIMEN SOURCE: ABNORMAL
SPECIMEN SOURCE: NORMAL
TACROLIMUS BLD-MCNC: ABNORMAL UG/L (ref 5–15)
TME LAST DOSE: ABNORMAL H
TME LAST DOSE: ABNORMAL H
WBC # BLD AUTO: 4.2 10E9/L (ref 4–11)

## 2017-03-11 PROCEDURE — 80048 BASIC METABOLIC PNL TOTAL CA: CPT | Performed by: STUDENT IN AN ORGANIZED HEALTH CARE EDUCATION/TRAINING PROGRAM

## 2017-03-11 PROCEDURE — 25000125 ZZHC RX 250: Performed by: INTERNAL MEDICINE

## 2017-03-11 PROCEDURE — 82652 VIT D 1 25-DIHYDROXY: CPT | Performed by: INTERNAL MEDICINE

## 2017-03-11 PROCEDURE — 25000131 ZZH RX MED GY IP 250 OP 636 PS 637: Mod: GY | Performed by: HOSPITALIST

## 2017-03-11 PROCEDURE — 25000132 ZZH RX MED GY IP 250 OP 250 PS 637: Mod: GY | Performed by: STUDENT IN AN ORGANIZED HEALTH CARE EDUCATION/TRAINING PROGRAM

## 2017-03-11 PROCEDURE — A9270 NON-COVERED ITEM OR SERVICE: HCPCS | Mod: GY | Performed by: STUDENT IN AN ORGANIZED HEALTH CARE EDUCATION/TRAINING PROGRAM

## 2017-03-11 PROCEDURE — 80195 ASSAY OF SIROLIMUS: CPT | Performed by: STUDENT IN AN ORGANIZED HEALTH CARE EDUCATION/TRAINING PROGRAM

## 2017-03-11 PROCEDURE — 25000128 H RX IP 250 OP 636: Performed by: STUDENT IN AN ORGANIZED HEALTH CARE EDUCATION/TRAINING PROGRAM

## 2017-03-11 PROCEDURE — 83970 ASSAY OF PARATHORMONE: CPT | Performed by: INTERNAL MEDICINE

## 2017-03-11 PROCEDURE — 00000146 ZZHCL STATISTIC GLUCOSE BY METER IP

## 2017-03-11 PROCEDURE — 12000001 ZZH R&B MED SURG/OB UMMC

## 2017-03-11 PROCEDURE — 99233 SBSQ HOSP IP/OBS HIGH 50: CPT | Mod: GC | Performed by: INTERNAL MEDICINE

## 2017-03-11 PROCEDURE — 97532 ZZHC OT COGNITIVE SKILLS EA 15 MIN: CPT | Mod: GO

## 2017-03-11 PROCEDURE — 97112 NEUROMUSCULAR REEDUCATION: CPT | Mod: GP | Performed by: PHYSICAL THERAPIST

## 2017-03-11 PROCEDURE — 25000132 ZZH RX MED GY IP 250 OP 250 PS 637: Mod: GY | Performed by: INTERNAL MEDICINE

## 2017-03-11 PROCEDURE — 97165 OT EVAL LOW COMPLEX 30 MIN: CPT | Mod: GO

## 2017-03-11 PROCEDURE — A9270 NON-COVERED ITEM OR SERVICE: HCPCS | Mod: GY | Performed by: INTERNAL MEDICINE

## 2017-03-11 PROCEDURE — 85027 COMPLETE CBC AUTOMATED: CPT | Performed by: STUDENT IN AN ORGANIZED HEALTH CARE EDUCATION/TRAINING PROGRAM

## 2017-03-11 PROCEDURE — 25000131 ZZH RX MED GY IP 250 OP 636 PS 637: Mod: GY | Performed by: STUDENT IN AN ORGANIZED HEALTH CARE EDUCATION/TRAINING PROGRAM

## 2017-03-11 PROCEDURE — 97530 THERAPEUTIC ACTIVITIES: CPT | Mod: GP | Performed by: PHYSICAL THERAPIST

## 2017-03-11 PROCEDURE — 94660 CPAP INITIATION&MGMT: CPT

## 2017-03-11 PROCEDURE — 40000193 ZZH STATISTIC PT WARD VISIT: Performed by: PHYSICAL THERAPIST

## 2017-03-11 PROCEDURE — 97161 PT EVAL LOW COMPLEX 20 MIN: CPT | Mod: GP | Performed by: PHYSICAL THERAPIST

## 2017-03-11 PROCEDURE — 40000133 ZZH STATISTIC OT WARD VISIT

## 2017-03-11 PROCEDURE — 36415 COLL VENOUS BLD VENIPUNCTURE: CPT | Performed by: STUDENT IN AN ORGANIZED HEALTH CARE EDUCATION/TRAINING PROGRAM

## 2017-03-11 PROCEDURE — 80197 ASSAY OF TACROLIMUS: CPT | Performed by: STUDENT IN AN ORGANIZED HEALTH CARE EDUCATION/TRAINING PROGRAM

## 2017-03-11 RX ORDER — FLUCONAZOLE 200 MG/1
200 TABLET ORAL DAILY
Status: DISCONTINUED | OUTPATIENT
Start: 2017-03-11 | End: 2017-03-15 | Stop reason: HOSPADM

## 2017-03-11 RX ORDER — AMOXICILLIN 250 MG/1
500 CAPSULE ORAL EVERY 12 HOURS SCHEDULED
Status: DISCONTINUED | OUTPATIENT
Start: 2017-03-11 | End: 2017-03-15 | Stop reason: HOSPADM

## 2017-03-11 RX ORDER — GABAPENTIN 300 MG/1
300 CAPSULE ORAL AT BEDTIME
Status: DISCONTINUED | OUTPATIENT
Start: 2017-03-11 | End: 2017-03-15 | Stop reason: HOSPADM

## 2017-03-11 RX ADMIN — AMOXICILLIN 500 MG: 250 CAPSULE ORAL at 11:21

## 2017-03-11 RX ADMIN — INSULIN ASPART 2 UNITS: 100 INJECTION, SOLUTION INTRAVENOUS; SUBCUTANEOUS at 09:15

## 2017-03-11 RX ADMIN — TACROLIMUS 1 MG: 1 CAPSULE ORAL at 09:14

## 2017-03-11 RX ADMIN — SIROLIMUS 1.5 MG: 1 TABLET, SUGAR COATED ORAL at 09:14

## 2017-03-11 RX ADMIN — LEVOTHYROXINE SODIUM 50 MCG: 50 TABLET ORAL at 09:14

## 2017-03-11 RX ADMIN — FLUCONAZOLE 200 MG: 200 TABLET ORAL at 14:08

## 2017-03-11 RX ADMIN — GABAPENTIN 300 MG: 300 CAPSULE ORAL at 09:15

## 2017-03-11 RX ADMIN — ONDANSETRON 4 MG: 2 INJECTION INTRAMUSCULAR; INTRAVENOUS at 11:08

## 2017-03-11 RX ADMIN — INSULIN ASPART 1 UNITS: 100 INJECTION, SOLUTION INTRAVENOUS; SUBCUTANEOUS at 17:44

## 2017-03-11 RX ADMIN — HYDRALAZINE HYDROCHLORIDE 10 MG: 20 INJECTION INTRAMUSCULAR; INTRAVENOUS at 14:45

## 2017-03-11 RX ADMIN — PRAVASTATIN SODIUM 10 MG: 10 TABLET ORAL at 09:15

## 2017-03-11 RX ADMIN — LABETALOL HCL 200 MG: 200 TABLET, FILM COATED ORAL at 19:30

## 2017-03-11 RX ADMIN — TACROLIMUS 1 MG: 1 CAPSULE ORAL at 19:30

## 2017-03-11 RX ADMIN — HEPARIN SODIUM 5000 UNITS: 5000 INJECTION, SOLUTION INTRAVENOUS; SUBCUTANEOUS at 09:15

## 2017-03-11 RX ADMIN — AMOXICILLIN 500 MG: 250 CAPSULE ORAL at 19:31

## 2017-03-11 RX ADMIN — CLONIDINE HYDROCHLORIDE 0.1 MG: 0.1 TABLET ORAL at 19:30

## 2017-03-11 RX ADMIN — PREDNISONE 2.5 MG: 2.5 TABLET ORAL at 09:14

## 2017-03-11 RX ADMIN — CLONIDINE HYDROCHLORIDE 0.1 MG: 0.1 TABLET ORAL at 09:14

## 2017-03-11 RX ADMIN — FAMOTIDINE 20 MG: 20 TABLET ORAL at 09:15

## 2017-03-11 RX ADMIN — LABETALOL HCL 200 MG: 200 TABLET, FILM COATED ORAL at 09:14

## 2017-03-11 RX ADMIN — INSULIN ASPART 1 UNITS: 100 INJECTION, SOLUTION INTRAVENOUS; SUBCUTANEOUS at 14:05

## 2017-03-11 NOTE — PROVIDER NOTIFICATION
Team notified of high BP of 194/78. IV PRN Hydralazine 10mg given, recheck 162/59. Will continue to monitor.

## 2017-03-11 NOTE — PROGRESS NOTES
Nephrology Progress Note  03/11/2017         Assessment & Recommendations:   Gem Jade is a 71 year old F with T2 DM , diabetic nephropathy, now s/p LDKT in 10/27/1999, with baseline Cr in 0.8-1, on sirolimus/tacrolimus/prednisone, admitted with AMS, found with ROB with a Cr of 3.3, and UA consistent with UTI. Uc postivive for enterococcus.     1. LDKT: with ROB, mixt picture: prerenal and UTI.  U/S with resolved hydronephrosis. Low tacrolimus level. Cr improving but still elevated.   -send Up/UCr , ROB with edema ( order in ) , GN?   2. Immunosuppression:  tacrolimus and sirolimus 12 hours level is low. Restarted today on Fluconazole.  Repeat Tac and Sirolimus  level tomorrow ( order in ) .  3. BP/ volume status: elevated BP, possible volume related. On Labetalol 200 mg BID and Clonidine 0.1 mg BID. Clonidine can give AMS in old patient. We would like to win her off Clonidine over time., but will hold on this for now. Off fluids. Give Lasix  20 mg iv once, goal to make her net neg 1 L. If once her edema is resolved her BP is still elevated, we can consider to switch Labetolol to Carvedilol.   4. CKD- MB: hypercalcemia, with low Vit D. Please send PHT, Vit D  5.Anemia: Hg stable  6. UTI: with chronic Johnson, now with Uc positive for enterococcus and yest. Treated with Ceftriaxone and Fluconazole. Transplant ID to be consulted. Urology consult regarding her indweling Johnson. Patient is bed bound, unclear reason. Please change the Johnson after 3 days of treatment with Fluconazole.     Recommendations were communicated to primary team via this note.     Seen and discussed with Dr. Aida Malhotra MD   [phone]    Interval History :   In the last 24 hours Gem Jade has been feeling better. Good Uout, Cr down trending but higher than her baseline.   Review of Systems:   4 pt ROS reviewed alone and are all negative     Physical Exam:   I/O last 3 completed shifts:  In: -   Out: 2300 [Urine:2300]  "  /74 (BP Location: Left arm)  Pulse 66  Temp 97.8  F (36.6  C) (Oral)  Resp 18  Ht 1.702 m (5' 7\")  Wt 91 kg (200 lb 9.6 oz)  SpO2 97%  BMI 31.42 kg/m2     GENERAL APPEARANCE: lying in bed   PULM: lungs clear to auscultation,  bilaterally, equal air movement, no clubbing  CV: regular rhythm, normal rate, no rub     -JVP no      -edema trace lower extremity edema    GI: soft, non tender, non distended, bowel sounds are  Normal   NEURO:  No  asterixis       Labs:   All labs reviewed by me  Electrolytes/Renal -   Recent Labs   Lab Test  03/11/17   0859  03/10/17   0731  03/09/17   0750   02/17/17   0644  02/16/17   2303   09/05/16   0617 09/04/16   0815   NA  136  137  141   < >  135  131*   < >  135  136   POTASSIUM  3.8  4.0  4.1   < >  4.4   --    < >  4.0  4.0   CHLORIDE  104  105  107   < >  97   --    < >  103  104   CO2  24  25  26   < >  25   --    < >  21  22   BUN  33*  35*  39*   < >  40*   --    < >  16  12   CR  2.40*  2.64*  2.94*   < >  1.05*   --    < >  0.54  0.58   GLC  217*  214*  138*   < >  354*   --    < >  177*  178*   ROGER  8.1*  8.5  8.9   < >  11.2*   --    < >  8.4*  8.7   MAG   --    --    --    --    --   2.0   --   2.2  1.4*   PHOS   --    --   3.2   --   3.2  2.9   --    --    --     < > = values in this interval not displayed.       CBC -   Recent Labs   Lab Test  03/11/17   0859  03/10/17   0731  03/09/17   0750   WBC  4.2  4.3  3.5*   HGB  8.9*  8.6*  9.4*   PLT  98*  115*  107*       LFTs -   Recent Labs   Lab Test  03/09/17   0750  03/08/17   1243  02/16/17 2007 09/05/16   0617   ALKPHOS   --   87  80  53   BILITOTAL   --   0.3  0.5  0.9   ALT   --   <6  14  10   AST   --   13  9  15   PROTTOTAL   --   7.9  8.2  7.3   ALBUMIN  2.7*  3.0*  2.8*  2.6*       Iron Panel -   Recent Labs   Lab Test  02/17/17   2358  09/04/16   0815  08/07/16   0750   IRON  50  124  12*   IRONSAT  29  97*  7*   GIANA  1069*  2676*  645*         Imaging:  All imaging studies reviewed by me. "     Current Medications:    insulin glargine  12 Units Subcutaneous At Bedtime     amoxicillin  500 mg Oral Q12H HO     gabapentin  300 mg Oral At Bedtime     fluconazole  200 mg Oral Daily     tacrolimus  1 mg Oral BID     famotidine  20 mg Oral Daily     cloNIDine  0.1 mg Oral BID     labetalol  200 mg Oral BID     levothyroxine  50 mcg Oral Daily     predniSONE  2.5 mg Oral Daily     pravastatin  10 mg Oral Daily     insulin aspart  1-7 Units Subcutaneous TID AC     insulin aspart  1-5 Units Subcutaneous At Bedtime     sirolimus  1.5 mg Oral Daily        Rochelle Malhotra MD     Patient was seen and evaluated by me, Matt Parra MD. I have reviewed the note and agree with the the plan of care as documented by the fellow.  Discussed fluconazole regimen and rico replacement. Repeat levels

## 2017-03-11 NOTE — PLAN OF CARE
Problem: Goal Outcome Summary  Goal: Goal Outcome Summary  Outcome: Therapy, progress toward functional goals is gradual  PT-5A: wendi completed. Explained possible issue with her dizziness is her lack of movement and limited stimulation to vestibular system. Explained rationale for VOR exercises and she is in agreement to try. Will see pt 3x/week for instruction in home program. Anticipate discharge to home when medically appropriate.

## 2017-03-11 NOTE — PROGRESS NOTES
03/11/17 1155   Quick Adds   Type of Visit Initial PT Evaluation   Living Environment   Lives With spouse   Living Arrangements house   Home Accessibility stairs to enter home;stairs within home   Number of Stairs to Enter Home 7   Transportation Available van, wheelchair accessible;family or friend will provide;ambulance   Living Environment Comment Pt reports ramped entrance to home.    Self-Care   Dominant Hand right   Usual Activity Tolerance poor   Current Activity Tolerance poor   Regular Exercise no   Equipment Currently Used at Home lift device;wheelchair;other (see comments)   Activity/Exercise/Self-Care Comment Pt states that she has stair glide, 2 mechanical lifts, W/C, accessible van, elevating tempurpedic bed with rails   Functional Level Prior   Ambulation 4-->completely dependent   Transferring 3-->assistive equipment and person   Toileting 3-->assistive equipment and person   Bathing 3-->assistive equipment and person   Dressing 2-->assistive person   Eating 0-->independent   Communication 0-->understands/communicates without difficulty   Swallowing 0-->swallows foods/liquids without difficulty   Cognition 0 - no cognition issues reported   Fall history within last six months no   Which of the above functional risks had a recent onset or change? cognition   Prior Functional Level Comment Recent altered mental status. Per pt, she has not been out of the bed at home in approximately 5 years despite all the equipment   General Information   Onset of Illness/Injury or Date of Surgery - Date 03/08/17   Referring Physician Sheila Armstrong MD   Patient/Family Goals Statement Tolerate being out of bed at home   Pertinent History of Current Problem (include personal factors and/or comorbidities that impact the POC) Pt admitted thru ED with AMS & hydronephrosis. PMHx: kidney tx; UTI; HTN; HLD; diabetes; hypothyroidism. 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 She denies vertigo symptoms; she states that she is motion sensitive, unable to tolerate swings, merry-go-round. She reports dizziness & nausea with movement    General Observations Pt lying supine upon PTs arrival; agreeable to PT session   Cognitive Status Examination   Orientation orientation to person, place and time   Level of Consciousness alert   Pain Assessment   Patient Currently in Pain Yes, see Vital Sign flowsheet   Range of Motion (ROM)   ROM Comment exhibits limited hip flexion to approx 90 degrees bilaterally; right knee: 10-90 degrees; left knee: 20-80 degrees; left ankle to 0 degrees dorsiflexion, right ankle lacks 10 degrees from neutral dorsiflexion   Strength   Strength Comments hip flex 2-/5 bilaterally; knee ext 3-/5 bilaterally; ankles 2/5 bilaterally   Bed Mobility   Bed Mobility Comments rolls with rails & assist to manage L/Es   Transfer Skills   Transfer Comments use of mechanical lift to transfer from bed to recliner   Gait   Gait Comments nonambulatory   General Therapy Interventions   Planned Therapy Interventions ROM;stretching;other (see comments)  (VOR exercises)   Clinical Impression   Criteria for Skilled Therapeutic Intervention yes, treatment indicated   PT Diagnosis dizziness with motion   Influenced by the following impairments pain, decreased ROM, decreased strength   Functional limitations due to impairments per pt, has been bedbound for approximately 5 years   Clinical Presentation Stable/Uncomplicated   Clinical Presentation Rationale no recent changes in mobility; dependent per pt report   Clinical Decision Making (Complexity) Low complexity   Therapy Frequency` 3 times/week   Predicted Duration of Therapy Intervention (days/wks) 10 days   Anticipated Discharge Disposition Home with Assist   Risk & Benefits of therapy have been explained Yes   Patient, Family & other staff in agreement with plan of  "care Yes   [facility] AM-PAC TM \"6 Clicks\"   2016, Trustees of [facility], under license to [employer].  All rights reserved.   6 Clicks Short Forms Basic Mobility Inpatient Short Form   [facility]-PAC  \"6 Clicks\" V.2 Basic Mobility Inpatient Short Form   1. Turning from your back to your side while in a flat bed without using bedrails? 2 - A Lot   2. Moving from lying on your back to sitting on the side of a flat bed without using bedrails? 1 - Total   3. Moving to and from a bed to a chair (including a wheelchair)? 1 - Total   4. Standing up from a chair using your arms (e.g., wheelchair, or bedside chair)? 1 - Total   5. To walk in hospital room? 1 - Total   6. Climbing 3-5 steps with a railing? 1 - Total   Basic Mobility Raw Score (Score out of 24.Lower scores equate to lower levels of function) 7   Total Evaluation Time   Total Evaluation Time (Minutes) 10     "

## 2017-03-11 NOTE — PROGRESS NOTES
03/11/17 0900   Quick Adds   Type of Visit Initial Occupational Therapy Evaluation   Living Environment   Lives With spouse   Living Arrangements house   Home Accessibility stairs to enter home;stairs within home   Number of Stairs to Enter Home 7   Number of Stairs Within Home (split level home with stair lift for access)   Transportation Available van, wheelchair accessible;family or friend will provide;ambulance   Living Environment Comment Per patient, she has electric chair for stair management, 2 hoyers and slings, w/c, w/c van, elevating tempurpedic bed with rails   Self-Care   Dominant Hand right   Usual Activity Tolerance poor   Current Activity Tolerance poor   Regular Exercise no   Equipment Currently Used at Home (pt reports she uses none of her DME, with exception of bed)   Activity/Exercise/Self-Care Comment reports spouse and caregivers provide assist with all ADLS and IADLs   Functional Level Prior   Ambulation 4-->completely dependent   Transferring 3-->assistive equipment and person   Toileting 3-->assistive equipment and person   Bathing 3-->assistive equipment and person   Dressing 2-->assistive person   Eating 0-->independent   Communication 0-->understands/communicates without difficulty   Swallowing 0-->swallows foods/liquids without difficulty   Cognition 0 - no cognition issues reported   Fall history within last six months no   Which of the above functional risks had a recent onset or change? cognition  (recent AMS, no concerns at time of eval)       Present no   General Information   Onset of Illness/Injury or Date of Surgery - Date 03/08/17   Referring Physician Sheila Armstrong MD   Patient/Family Goals Statement get up to chair and OOB   Additional Occupational Profile Info/Pertinent History of Current Problem Gem Jade is a 71 year old male with a hx of kidney transplant on sirolimus/tacrolimus/prednisone and chronic indwelling rico with recurrent  complicated UTIs, morbid obesity, severe vertigo, generalized weakness, chronic pain, bed bound  Hospital (2/16/17-2/20/17) due to altered mental status with hydronephrosis.   Precautions/Limitations other (see comments)  (skin breakdown)   Weight-Bearing Status - LUE full weight-bearing   Weight-Bearing Status - RUE full weight-bearing   Weight-Bearing Status - LLE other (see comments)  (has not stood x>6 years)   Weight-Bearing Status - RLE other (see comments)  (has not stood x>6 years)   General Observations up ad flakita   Cognitive Status Examination   Orientation orientation to person, place and time   Level of Consciousness alert   Able to Follow Commands WNL/WFL   Personal Safety (Cognitive) WNL/WFL   Memory intact   Attention Distractible during evaluation   Organization/Problem Solving Reports problems with organization;Reports problems with problem solving during evaluation   Executive Function No deficits were identified   Cognitive Comment see MoCA- patient scores 26/30, indicating normal cognition   Visual Perception   Visual Perception (impaired due to diabetic deficits B eyes)   Visual Perception Comments 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, 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 (uses magnifying glass)   Sensory Examination   Sensory Comments (neuropathy B LEs at baseline)   Pain Assessment   Patient Currently in Pain No   Integumentary/Edema   Integumentary/Edema no deficits were identifed   Range of Motion (ROM)   ROM Quick Adds Other (describe)  (UE WFL, LE not tested)   Strength   Manual Muscle Testing Quick Adds Other   Strength Comments B UEs grossly 3/5, significantly deconditioned   Coordination   Upper Extremity Coordination No deficits were identified   Transfer Skill: Bed to Chair/Chair to Bed   Level of [place]: Bed to Chair unable to perform   Transfer Skill: Sit to Stand   Level of [place]: Sit/Stand unable to perform   Transfer Skill: Toilet Transfer   Level of [place]: Toilet unable to perform  "  Bathing   Level of [place] dependent (less than 25% patients effort)   Upper Body Dressing   Level of [place]: Dress Upper Body dependent (less than 25% patients effort)   Lower Body Dressing   Level of [place]: Dress Lower Body dependent (less than 25% patients effort)   Toileting   Level of [place]: Toilet dependent (less than 25% patients effort)   Grooming   Level of [place]: Grooming minimum assist (75% patients effort)   Eating/Self Feeding   Level of [place]: Eating independent   Instrumental Activities of Daily Living (IADL)   IADL Comments spouse handles all   Activities of Daily Living Analysis   Impairments Contributing to Impaired Activities of Daily Living fear and anxiety;postural control impaired;ROM decreased;sensation decreased;strength decreased   General Therapy Interventions   Planned Therapy Interventions transfer training;home program guidelines;progressive activity/exercise;ADL retraining   Clinical Impression   Criteria for Skilled Therapeutic Interventions Met yes, treatment indicated   OT Diagnosis ADL deficits, limited strength   Influenced by the following impairments weakness, vertigo, bed bound status   Assessment of Occupational Performance 5 or more Performance Deficits   Identified Performance Deficits grooming, dressing, social skills, home managment, transfers, sitting, self cares   Clinical Decision Making (Complexity) Moderate complexity   Therapy Frequency 5 times/wk   Predicted Duration of Therapy Intervention (days/wks) 1 week   Anticipated Discharge Disposition Home with Assist;Other (see comments)  (adamently against TCU )   Risks and Benefits of Treatment have been explained. Yes   Patient, Family & other staff in agreement with plan of care Yes   [facility] AM-PAC TM \"6 Clicks\"   2016, Trustees of [facility], under license to [employer].  All rights reserved.   6 Clicks Short Forms Daily Activity Inpatient Short Form   [facility] AM-PAC  \"6 Clicks\" Daily Activity Inpatient Short Form   1. Putting on and taking off regular lower body clothing? 1 - Total   2. Bathing (including washing, rinsing, drying)? 1 - Total   3. Toileting, which includes using toilet, bedpan or urinal? 2 - A Lot   4. Putting on and taking off regular upper body clothing? 2 - A Lot   5. Taking care of personal grooming such as brushing teeth? 3 - A Little   6. Eating meals? 3 - A Little   Daily Activity Raw Score (Score out of 24.Lower scores equate to lower levels of function) 12   Total Evaluation Time   Total Evaluation Time (Minutes) 10     "

## 2017-03-11 NOTE — PLAN OF CARE
"BP (P) 140/58 (BP Location: Left arm)  Pulse 66  Temp 97.8  F (36.6  C) (Oral)  Resp 18  Ht 1.702 m (5' 7\")  Wt 91 kg (200 lb 9.6 oz)  SpO2 97%  BMI 31.42 kg/m2    Here with ROB and UTI. Pt a/o x4. BP elevated this afternoon (193/74), hydralazine given BP resolved to 140/58. Other vitals stable. Pt worked with PT/OT today, see notes. Zofran given prior to lift transfer to chair with PT to prevent nausea, pt denied any spinning only lightheadedness, denied nausea. Heparin d/c d/t dropping platelets.  D/c IV antibiotics and started w/amoxicilin. Prograf dose decreased. Lantus increased. Plan to change Johnson on Monday after amoxicillin course. Pt will remain here for continued monitoring of creat and kidney function. Will continue to monitor and follow POC.  "

## 2017-03-11 NOTE — PLAN OF CARE
"Problem: Goal Outcome Summary  Goal: Goal Outcome Summary  OT/5A: administered MoCA with slight modifications for visual deficits (did not impact scoring). .Patient talkative and distractable, requiring extended time to complete task. Patient scores 26/30, indicating normal cognitive function. She does report some difficulty with confusion over the last week, specifically halluncinations, but feels this has cleared. Patient with difficulty placing all numbers around clock, placing 12,3,6,and9 correctly, but then confused and unable to place numbers between those. Pt shocked at this and states that she must still have some confusion. Otherwise, performs well on memory, delayed recall, naming, executive function, and orientation tasks. Scores appropriately on attention tasks, but requires extended time and frequently states \"where was I?\".      REC: home with assist      "

## 2017-03-11 NOTE — PLAN OF CARE
Problem: Goal Outcome Summary  Goal: Goal Outcome Summary  Outcome: No Change  A&O but forgetful. VSS on RA. BPs elevated highest 194/78, IV PRN Hydralazine given BP recheck 162/59, team notified. Tolerating regular diet. No BM this shift. Johnson patent, good output.  at bedside most of shift. PIV SL. Scheduled abx given.  & 242 scheduled insulin given. Will continue to monitor and follow POC.

## 2017-03-11 NOTE — PROGRESS NOTES
Medicine Daily Progress Note   03/11/2017    Gem Jade MRN# [id number]   Age: 71 year old  Sex: female YOB: 1945  Date of Admissions: 3/8/2017          Assessment and Plan:   70 y/o female with PMH significant for kidney transplant (1999), complex UTIs, morbid obesity, mobility issues, fatigue, DM-II, chronic pain, hypothyroidism, HTN, and HLD who was admitted for acute kidney injury and altered mental status    # ROB in setting of UTI vs transplant rejection  Creatinine 3.49 on 3/6/2017, trending down to 2.40 today, up from baseline of 0.8 - 1.0.   - Appreciate transplant nephrology, who believe that this is not rejection but could be infectious process. Recommended viral work-up and tacrolimus/sirolimus level.   - Transplant Nephrology consulted, who believe that this is not rejection but could be infectious process, recommended consulting Transplant Infectious Disease.    - Trending down of platelets (98) and hemoglobin (8.9). Subcutaneous heparin discontinued. Will continue to trend CBC.  - Tacrolimus 4.1 (decreased), sirolimus 7.5. Will continue to consult Transplant Nephrology.  - BK virus quant negative, HIV screen nonreactive, CMV not detected, HepC antibody nonreactive , and HepB sAG nonreactive. EBV quant pending.     # Altered mental status in setting of UTI vs metabolic encephalopathy  Patient altered mental status improving from admission per patient and her . Decrease in polypharmacy   - Folate, B12, CK-Total, TSH = 1.20,  T4 = 0.72 (decreased), B12 = 1402 (increased), Folate = 5.2 (decreased), CK-Total = 36, RPR = negative  - PTH, 1,25 Dihydroxyvitamin D, and 25 Hydroxyvitamin D2/D3 ordered.   - Incentive spirometer in use.   - Treatment of UTI to rule out infectious etiology (see below)  - Continue trending BMP to rule out electrolyte disturbances  - Gabapentin lowered to 300mg QHS.   - Occupational therapy noted no cognitive impairment.     # Sacral ulcer  Seen on  previous admit in February 2017.   - Appreciate dressing and recs from Wound Care Nurse     # Urinary tract infection  Cloudy urine identified upon admission. Urine today is pale yellow in bag. Concerning for UTI due to previous presence of WBCs and leukocyte esterase.   - Chronic indwelling Johnson exchanged 3/9/17. Planning for replacement on Monday (3/13/17). Urology consulted, who recommended outpatient follow-up and urodynamic evaluation for concern of neurogenic bladder.  - Blood cultures (3/8/17) - NGTD  - Discontinue ceftriaxone.  - Started on Amoxicillin per Enterococcus faecalis sensitivities.   - Fluconazole 200mg QDay continued for yeast    # Hypertension  - Continue home clonidine and labetalol  - Hydralazine PRN    # HLD  - Continue home pravastatin     # Pain:   - Continue acetaminophen PRN  - Gabapentin lowered to 300mg QHS. Patient has no complaints of pain.    # Diarrhea:   - Continue lomotil PRN  - Continue Imodium PRN    # Hypothyroid  - Continue Synthroid  - Will monitor TSH/T4 levels    # Diabetes:   - Continue Lantus 10 units  - Medium SSI  - POC glucose checks before meals and bedtime    Summary  FEN: Renal Diet  Prophylaxis  - Isolation: Contact  - Lung: CPAP at night  - DVT: none  - GI: Pepcid  Code Status: FULL    Disposition: Pending workup for altered mental status / infection. Discussed her improvement during her admission with her  Keshawn.     Patient was seen and discussed with Dr. Roland Richard, DAVISS  Oral & Maxillofacial Surgery Resident  PGY-1  [phone]     Subjective (interval history):   Mrs. Jade had an uneventful evening. She states that she slept well and has had a good appetite. She denies trouble breathing, headache, dysphagia, or pain associated with her indwelling catheter. She continues to report that she feels her mental status is back to baseline, and denies having a hallucination for two days. She has met with occupational therapy and was pleased to  report that she did not have a mental deficit.      Physical Exam:   Vitals:  Temp:  [97.3  F (36.3  C)-98.3  F (36.8  C)] 98.1  F (36.7  C)  Pulse:  [66-82] 66  Heart Rate:  [71-77] 77  Resp:  [16-18] 18  BP: (119-194)/(51-78) 119/55  SpO2:  [97 %-99 %] 97 %    Temp  Av.6  F (36.4  C)  Min: 96.2  F (35.7  C)  Max: 98.4  F (36.9  C)      Pulse  Av.5  Min: 57  Max: 87 Resp  Av.5  Min: 8  Max: 30  SpO2  Av.3 %  Min: 87 %  Max: 100 %  FiO2 (%)  Av %  Min: 40 %  Max: 40 %       Wt Readings from Last 4 Encounters:   17 88.1 kg (194 lb 3.2 oz)   17 93.1 kg (205 lb 4.8 oz)   16 91.4 kg (201 lb 8 oz)   16 90.7 kg (200 lb)       Intake/Output Summary (Last 24 hours) at 17 1311  Last data filed at 17 1119   Gross per 24 hour   Intake          2101.67 ml   Output             2330 ml   Net          -228.33 ml       General: Laying in bed, in no acute distress. Awake and alert upon entry.   Skin: Sacral wound noted, confined to epidermis.  HEENT: Atraumatic, normocephalic. Pupils equally round and reactive to light, EOMI. Anicteric sclera. No swelling or masses.   Respiratory: Non-labored breathing, full saturation on room air  : Johnson in place, urine pale yellow in collection bag  Extremities: Lower extremity edema. Upper extremities have equal strength and mobility.   Neurologic: A&O x 3 (oriented to self, place, and president, orientation to time improved from yesterday), speech normal         Laboratory Data:   ROUTINE ICU LABS (Last four results)  CMP    Recent Labs  Lab 17  0859 03/10/17  0731 17  0750 17  1243    137 141 135   POTASSIUM 3.8 4.0 4.1 4.6   CHLORIDE 104 105 107 98   CO2 24 25 26 28   ANIONGAP 8 8 8 8   * 214* 138* 61*   BUN 33* 35* 39* 42*   CR 2.40* 2.64* 2.94* 3.37*   GFRESTIMATED 20* 18* 16* 13*   GFRESTBLACK 24* 22* 19* 16*   ROGER 8.1* 8.5 8.9 10.2*   PHOS  --   --  3.2  --    PROTTOTAL  --   --   --  7.9    ALBUMIN  --   --  2.7* 3.0*   BILITOTAL  --   --   --  0.3   ALKPHOS  --   --   --  87   AST  --   --   --  13   ALT  --   --   --  <6     CBC    Recent Labs  Lab 03/11/17  0859 03/10/17  0731 03/09/17  0750 03/08/17 2005 03/08/17  1243   WBC 4.2 4.3 3.5*  --  5.0   RBC 3.17* 3.06* 3.34*  --  3.75*   HGB 8.9* 8.6* 9.4*  --  10.9*   HCT 29.1* 28.3* 30.6*  --  34.5*   MCV 92 93 92  --  92   MCH 28.1 28.1 28.1  --  29.1   MCHC 30.6* 30.4* 30.7*  --  31.6   RDW 15.1* 15.2* 14.9  --  15.1*   PLT 98* 115* 107* 128* 137*     INR    Recent Labs  Lab 03/08/17  1243   INR 1.01       Culture data:  Urine culture (3/8/2017) - Enterococcus faecalis isolated, with sensitivity to ampicillin, penicillin, nitrofurantoin, and vancomycin noted.   Blood cultures (3/8/2017) - NGTD

## 2017-03-12 ENCOUNTER — APPOINTMENT (OUTPATIENT)
Dept: PHYSICAL THERAPY | Facility: CLINIC | Age: 72
DRG: 698 | End: 2017-03-12
Payer: MEDICARE

## 2017-03-12 ENCOUNTER — APPOINTMENT (OUTPATIENT)
Dept: OCCUPATIONAL THERAPY | Facility: CLINIC | Age: 72
DRG: 698 | End: 2017-03-12
Payer: MEDICARE

## 2017-03-12 LAB
ANION GAP SERPL CALCULATED.3IONS-SCNC: 10 MMOL/L (ref 3–14)
BUN SERPL-MCNC: 32 MG/DL (ref 7–30)
CALCIUM SERPL-MCNC: 7.6 MG/DL (ref 8.5–10.1)
CHLORIDE SERPL-SCNC: 106 MMOL/L (ref 94–109)
CO2 SERPL-SCNC: 22 MMOL/L (ref 20–32)
CREAT SERPL-MCNC: 2.16 MG/DL (ref 0.52–1.04)
ERYTHROCYTE [DISTWIDTH] IN BLOOD BY AUTOMATED COUNT: 15.4 % (ref 10–15)
GFR SERPL CREATININE-BSD FRML MDRD: 23 ML/MIN/1.7M2
GLUCOSE BLDC GLUCOMTR-MCNC: 192 MG/DL (ref 70–99)
GLUCOSE BLDC GLUCOMTR-MCNC: 209 MG/DL (ref 70–99)
GLUCOSE BLDC GLUCOMTR-MCNC: 217 MG/DL (ref 70–99)
GLUCOSE BLDC GLUCOMTR-MCNC: 279 MG/DL (ref 70–99)
GLUCOSE SERPL-MCNC: 225 MG/DL (ref 70–99)
HCT VFR BLD AUTO: 28.1 % (ref 35–47)
HGB BLD-MCNC: 8.6 G/DL (ref 11.7–15.7)
MCH RBC QN AUTO: 27.9 PG (ref 26.5–33)
MCHC RBC AUTO-ENTMCNC: 30.6 G/DL (ref 31.5–36.5)
MCV RBC AUTO: 91 FL (ref 78–100)
PLATELET # BLD AUTO: 108 10E9/L (ref 150–450)
POTASSIUM SERPL-SCNC: 4.1 MMOL/L (ref 3.4–5.3)
RBC # BLD AUTO: 3.08 10E12/L (ref 3.8–5.2)
SODIUM SERPL-SCNC: 138 MMOL/L (ref 133–144)
TACROLIMUS BLD-MCNC: 4.1 UG/L (ref 5–15)
TME LAST DOSE: ABNORMAL H
WBC # BLD AUTO: 4.7 10E9/L (ref 4–11)

## 2017-03-12 PROCEDURE — 80197 ASSAY OF TACROLIMUS: CPT | Performed by: STUDENT IN AN ORGANIZED HEALTH CARE EDUCATION/TRAINING PROGRAM

## 2017-03-12 PROCEDURE — 85027 COMPLETE CBC AUTOMATED: CPT | Performed by: STUDENT IN AN ORGANIZED HEALTH CARE EDUCATION/TRAINING PROGRAM

## 2017-03-12 PROCEDURE — 40000133 ZZH STATISTIC OT WARD VISIT

## 2017-03-12 PROCEDURE — 97530 THERAPEUTIC ACTIVITIES: CPT | Mod: GO

## 2017-03-12 PROCEDURE — 25000132 ZZH RX MED GY IP 250 OP 250 PS 637: Mod: GY | Performed by: STUDENT IN AN ORGANIZED HEALTH CARE EDUCATION/TRAINING PROGRAM

## 2017-03-12 PROCEDURE — A9270 NON-COVERED ITEM OR SERVICE: HCPCS | Mod: GY | Performed by: STUDENT IN AN ORGANIZED HEALTH CARE EDUCATION/TRAINING PROGRAM

## 2017-03-12 PROCEDURE — 25000128 H RX IP 250 OP 636: Performed by: INTERNAL MEDICINE

## 2017-03-12 PROCEDURE — 25900017 H RX MED GY IP 259 OP 259 PS 637: Mod: GY | Performed by: INTERNAL MEDICINE

## 2017-03-12 PROCEDURE — A9270 NON-COVERED ITEM OR SERVICE: HCPCS | Mod: GY | Performed by: INTERNAL MEDICINE

## 2017-03-12 PROCEDURE — 40000193 ZZH STATISTIC PT WARD VISIT

## 2017-03-12 PROCEDURE — 25000132 ZZH RX MED GY IP 250 OP 250 PS 637: Mod: GY | Performed by: INTERNAL MEDICINE

## 2017-03-12 PROCEDURE — 97112 NEUROMUSCULAR REEDUCATION: CPT | Mod: GP

## 2017-03-12 PROCEDURE — 25000131 ZZH RX MED GY IP 250 OP 636 PS 637: Mod: GY | Performed by: STUDENT IN AN ORGANIZED HEALTH CARE EDUCATION/TRAINING PROGRAM

## 2017-03-12 PROCEDURE — 25000128 H RX IP 250 OP 636: Performed by: STUDENT IN AN ORGANIZED HEALTH CARE EDUCATION/TRAINING PROGRAM

## 2017-03-12 PROCEDURE — 00000146 ZZHCL STATISTIC GLUCOSE BY METER IP

## 2017-03-12 PROCEDURE — 80048 BASIC METABOLIC PNL TOTAL CA: CPT | Performed by: STUDENT IN AN ORGANIZED HEALTH CARE EDUCATION/TRAINING PROGRAM

## 2017-03-12 PROCEDURE — 99233 SBSQ HOSP IP/OBS HIGH 50: CPT | Mod: GC | Performed by: INTERNAL MEDICINE

## 2017-03-12 PROCEDURE — 36415 COLL VENOUS BLD VENIPUNCTURE: CPT | Performed by: STUDENT IN AN ORGANIZED HEALTH CARE EDUCATION/TRAINING PROGRAM

## 2017-03-12 PROCEDURE — 25000131 ZZH RX MED GY IP 250 OP 636 PS 637: Mod: GY | Performed by: HOSPITALIST

## 2017-03-12 PROCEDURE — 12000001 ZZH R&B MED SURG/OB UMMC

## 2017-03-12 PROCEDURE — 97530 THERAPEUTIC ACTIVITIES: CPT | Mod: GP

## 2017-03-12 RX ORDER — MECLIZINE HCL 12.5 MG 12.5 MG/1
12.5 TABLET ORAL 3 TIMES DAILY PRN
Status: DISCONTINUED | OUTPATIENT
Start: 2017-03-12 | End: 2017-03-12

## 2017-03-12 RX ORDER — MECLIZINE HCL 12.5 MG 12.5 MG/1
12.5 TABLET ORAL 3 TIMES DAILY
Status: DISCONTINUED | OUTPATIENT
Start: 2017-03-12 | End: 2017-03-15 | Stop reason: HOSPADM

## 2017-03-12 RX ORDER — FUROSEMIDE 10 MG/ML
20 INJECTION INTRAMUSCULAR; INTRAVENOUS ONCE
Status: COMPLETED | OUTPATIENT
Start: 2017-03-12 | End: 2017-03-12

## 2017-03-12 RX ADMIN — GABAPENTIN 300 MG: 300 CAPSULE ORAL at 22:24

## 2017-03-12 RX ADMIN — AMOXICILLIN 500 MG: 250 CAPSULE ORAL at 19:16

## 2017-03-12 RX ADMIN — PREDNISONE 2.5 MG: 2.5 TABLET ORAL at 08:24

## 2017-03-12 RX ADMIN — LABETALOL HCL 200 MG: 200 TABLET, FILM COATED ORAL at 19:16

## 2017-03-12 RX ADMIN — INSULIN ASPART 2 UNITS: 100 INJECTION, SOLUTION INTRAVENOUS; SUBCUTANEOUS at 12:24

## 2017-03-12 RX ADMIN — FAMOTIDINE 20 MG: 20 TABLET ORAL at 08:24

## 2017-03-12 RX ADMIN — FUROSEMIDE 20 MG: 10 INJECTION, SOLUTION INTRAVENOUS at 14:53

## 2017-03-12 RX ADMIN — ONDANSETRON 4 MG: 2 INJECTION INTRAMUSCULAR; INTRAVENOUS at 08:48

## 2017-03-12 RX ADMIN — MECLIZINE 12.5 MG: 12.5 TABLET ORAL at 21:12

## 2017-03-12 RX ADMIN — ACETAMINOPHEN 650 MG: 325 TABLET, FILM COATED ORAL at 11:08

## 2017-03-12 RX ADMIN — FLUCONAZOLE 200 MG: 200 TABLET ORAL at 08:24

## 2017-03-12 RX ADMIN — CLONIDINE HYDROCHLORIDE 0.1 MG: 0.1 TABLET ORAL at 19:15

## 2017-03-12 RX ADMIN — PRAVASTATIN SODIUM 10 MG: 10 TABLET ORAL at 08:22

## 2017-03-12 RX ADMIN — LEVOTHYROXINE SODIUM 50 MCG: 50 TABLET ORAL at 08:24

## 2017-03-12 RX ADMIN — SIROLIMUS 1.5 MG: 1 TABLET, SUGAR COATED ORAL at 08:22

## 2017-03-12 RX ADMIN — INSULIN ASPART 2 UNITS: 100 INJECTION, SOLUTION INTRAVENOUS; SUBCUTANEOUS at 17:20

## 2017-03-12 RX ADMIN — LABETALOL HCL 200 MG: 200 TABLET, FILM COATED ORAL at 08:24

## 2017-03-12 RX ADMIN — CLONIDINE HYDROCHLORIDE 0.1 MG: 0.1 TABLET ORAL at 08:24

## 2017-03-12 RX ADMIN — TACROLIMUS 1 MG: 1 CAPSULE ORAL at 08:24

## 2017-03-12 RX ADMIN — TACROLIMUS 1 MG: 1 CAPSULE ORAL at 19:16

## 2017-03-12 RX ADMIN — AMOXICILLIN 500 MG: 250 CAPSULE ORAL at 08:24

## 2017-03-12 NOTE — PLAN OF CARE
Problem: Goal Outcome Summary  Goal: Goal Outcome Summary  Outcome: No Change  VSS on RA elevated BPs. A&Ox4 but intermittently forgetful. Tolerating diet. Denies pain and nausea. 1 large BM this shift. Johnson patent with good output. Switched to PO amoxicillin. PIV SL.  updated.  & 261 managed with scheduled insulin. Will continue to monitor and follow POC.

## 2017-03-12 NOTE — CONSULTS
Urology Consult    Name: Gem Jade    MRN: [id number]   YOB: 1945               Chief Complaint:   Recurrent UTI       History of Present Illness:   Gem Jade is a 71 year old female with a complex PMH notable for renal transplant (1999), obesity, DM II, hypothyroidism, HTN and HLD along with a urologic history urinary retention managed with a chronic indwelling rico catheter and recurrent UTI who was admitted in the setting of ROB and AMS.  Subsequent workup suggestive of underlying UTI with ROB believed to be pre-renal in nature.   Renal US without hydronephrosis.  Patient started on amoxicillin and fluconazole with plan to exchange indwelling catheter.   Urology was consulted for further evaluation.    Of note, the patient was previously evaluated by urology in 2012 for UTI and retention.  At that time a catheter was placed with recommend outpatient follow up with either cystoscopy or urodynamics to evaluate bladder capacity and possible neurogenic bladder physiology.  Patient was not seen again by urology.  Since 2012 patient has had a chronic indwelling catheter.  She has continued to have regular infections since that time.            Past Medical History:     Past Medical History   Diagnosis Date     Background diabetic retinopathy(362.01)      extensive diabetic retinopathy, s/p multiple laser treatments     Diabetic gastroparesis (H)      followed by [facility] (Dr. 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)       Urinary tract infection, site not specified      Chronic UTIs     UTI (urinary tract infection) due to urinary indwelling catheter (H)             Past Surgical History:     Past Surgical History   Procedure Laterality Date     C nonspecific procedure  10/99     kidney transplant     C nonspecific procedure  8/00     MRI head, lumbar spine, pelvis     Hc left heart catheterization  1999     Cardiac Cath, Left Heart, Negative  (@ [facility])     C total knee arthroplasty  3/03     Knee Replacement, Total right, post op PE     Hc remv cataract extracap,insert lens  8/04     bilateral cataract repaired, left eye      Hc left heart catheterization  4/05     normal coronary angiogram at [facility], had mild troponin rise (0.71)     Cholecystectomy              Social History:     Social History   Substance Use Topics     Smoking status: Former Smoker     Years: 17.00     Quit date: 6/1/1987     Smokeless tobacco: Not on file     Alcohol use No            Family History:     Family History   Problem Relation Age of Onset     DIABETES Mother      DIABETES Brother      Hypertension Father      [id number] pneumonia     HEART DISEASE Father             Allergies:     Allergies   Allergen Reactions     Bactrim Ds [Sulfamethoxazole W/Trimethoprim]      Creatinine level increased     Atorvastatin Calcium      myalgias, muscle aches     Codeine Nausea and Vomiting     Darvocet [Propoxyphene N-Apap] Nausea and Vomiting     Hydromorphone      Levofloxacin Diarrhea     Morphine      Nausea and vomiting     Niaspan [Niacin] GI Disturbance     Flushing even at low dose, GI upset     Percocet [Oxycodone-Acetaminophen]      Vomiting after taking med couple of times     Vicodin [Hydrocodone-Acetaminophen] Nausea and Vomiting            Medications:     Current Facility-Administered Medications   Medication     meclizine (ANTIVERT) tablet 12.5 mg     insulin glargine (LANTUS) injection 12 Units     amoxicillin (AMOXIL) capsule 500 mg      gabapentin (NEURONTIN) capsule 300 mg     fluconazole (DIFLUCAN) tablet 200 mg     tacrolimus (PROGRAF - GENERIC EQUIVALENT) capsule 1 mg     famotidine (PEPCID) tablet 20 mg     hydrALAZINE (APRESOLINE) injection 10-20 mg     ondansetron (ZOFRAN) injection 4 mg     acetaminophen (TYLENOL) tablet 325-650 mg     cloNIDine (CATAPRES) tablet 0.1 mg     diphenoxylate-atropine (LOMOTIL) 2.5-0.025 MG per tablet 1 tablet     labetalol (NORMODYNE) tablet 200 mg     levothyroxine (SYNTHROID/LEVOTHROID) tablet 50 mcg     loperamide (IMODIUM) capsule 2 mg     predniSONE (DELTASONE) tablet 2.5 mg     pravastatin (PRAVACHOL) tablet 10 mg     miconazole (MICATIN; MICRO GUARD) 2 % powder 1 g     naloxone (NARCAN) injection 0.1-0.4 mg     glucose 40 % gel 15-30 g    Or     dextrose 50 % injection 25-50 mL    Or     glucagon injection 1 mg     insulin aspart (NovoLOG) inj (RAPID ACTING)     insulin aspart (NovoLOG) inj (RAPID ACTING)     sirolimus (RAPAMUNE BRAND) tablet 1.5 mg             Review of Systems:    ROS: 10 point ROS neg other than the symptoms noted above in the HPI           Physical Exam:   VS:  T: 99    HR: 74    BP: 164/70    RR: 18   GEN:  AOx3.  NAD.    LUNGS: Non-labored breathing.   BACK:  No midline or CVA tenderness.  ABD:  Soft obese abdomen  NT.  ND.  No rebound or guarding.  No masses. No CVAT  :  Catheter productive of clear yellow urine            Data:   All laboratory data reviewed:      Recent Labs  Lab 03/12/17  0853 03/11/17  0859 03/10/17  0731 03/09/17  0750   WBC 4.7 4.2 4.3 3.5*   HGB 8.6* 8.9* 8.6* 9.4*   * 98* 115* 107*       Recent Labs  Lab 03/12/17  0853 03/11/17  0859 03/10/17  0731 03/09/17  0750    136 137 141   POTASSIUM 4.1 3.8 4.0 4.1   CHLORIDE 106 104 105 107   CO2 22 24 25 26   BUN 32* 33* 35* 39*   CR 2.16* 2.40* 2.64* 2.94*   * 217* 214* 138*   ROGER 7.6* 8.1* 8.5 8.9   PHOS  --   --   --  3.2       Recent Labs  Lab 03/08/17  1300   COLOR Yellow   APPEARANCE  Cloudy   URINEGLC Negative   URINEBILI Negative   URINEKETONE Negative   SG 1.015   URINEPH 6.5   PROTEIN 300*   NITRITE Negative   LEUKEST Large*   RBCU 154*   WBCU >182*       All pertinent imaging reviewed:    Results for orders placed or performed during the hospital encounter of 03/08/17   US Renal Transplant    Narrative    EXAMINATION: US RENAL TRANSPLANT, 3/8/2017 3:12 PM     COMPARISON: 2/16/2017    HISTORY: Acute kidney injury    TECHNIQUE:  Grey-scale, color Doppler and spectral flow analysis.    FINDINGS:  The transplant kidney is located right lower quadrant, and measures  13.1 cm length. Parenchyma is of normal thickness and echogenicity. No  focal lesions. No hydronephrosis. No perinephric fluid collection.      Renal Vein Flow:  12 cm/sec at hilum.   40 cm/sec at anastomosis.    Upper Pole Arcuate artery:  0.83 resistive index.    Mid pole Arcuate artery:  0.86 resistive index.     Lower Pole Arcuate artery:  0.84 resistive index.     Renal artery flow:   89 cm/sec peak systolic at hilum.  104 cm/sec peak systolic at anastomosis.    Iliac artery flow:  152 cm/sec peak systolic above anastomosis.  90 cm/sec peak systolic below anastomosis.    Iliac vein flow:  Patent above and below        Impression    IMPRESSION:    1. Mildly increased resistive indices in arcuate arteries, slightly  decreased from previous study. Patent Doppler examination of the  transplant vasculature. The increased RI's are of concern for  rejection.  2. Resolution of hydronephrosis.      I have personally reviewed the examination and initial interpretation  and I agree with the findings.    BRIAN RICHARD MD     *Note: Due to a large number of results and/or encounters for the requested time period, some results have not been displayed. A complete set of results can be found in Results Review.       CT scan of the abdomen (2/2017):   moderate renal transplant hydronephrosis without  hydroureter. A punctate nonobstructive stone  is seen is the inferior  pole calyx    Renal US (3/8/2017) - Resolved hydronephrosis of transplant kidney           Impression and Plan:   Impression:   Gem Jade is a 71 year old female with a history of kidney transplant, DM and presumed neurogenic bladder managed with a chronic indwelling catheter who was admitted in setting of RBO and UTI.     Recurrent UTI most likely attributable to immunosuppressed state with chronic indwelling catheter.  There is no evidence of contributory obstructive process during the current episode.  As such, there is no indication for urologic intervention at this time.  Patient may, however, benefit from further outpatient evaluation with urodynamics to establish suspected neurogenic bladder physiology.  Given duration of catheter use, however, the likelihood of resuming catheter-free voiding is unlikely.            This patient's exam findings, labs, and imaging discussed with urology staff surgeon Dr. Jc, who developed the treatment plan.    Baltazar Nelson MD  Urology Resident

## 2017-03-12 NOTE — PLAN OF CARE
Pt a/o x4. Creatinine trending down.  One time dose of lasix given. Tylenol given x1 for back pain. Zofran given prior to working with OT, emesis occurred. Meclizine ordered PRN now in addition to zofran. Plan for catheter exchange tomorrow. Will continue to monitor and follow POC.

## 2017-03-12 NOTE — PROGRESS NOTES
Medicine Daily Progress Note   03/12/2017    Gem Jade MRN# [id number]   Age: 71 year old  Sex: female YOB: 1945  Date of Admissions: 3/8/2017          Assessment and Plan:   72 y/o female with PMH significant for kidney transplant (1999), complex UTIs, morbid obesity, mobility issues, fatigue, DM-II, chronic pain, hypothyroidism, HTN, and HLD who was admitted for acute kidney injury and altered mental status    # ROB in setting of UTI vs transplant rejection  Creatinine 3.49 on 3/6/2017, trending down to 2.16 today, still up from baseline of 0.8 - 1.0.   - Appreciate transplant nephrology, who believe that this is not rejection but could be infectious process. Recommended viral work-up and tacrolimus/sirolimus level.   - Transplant Nephrology consulted, who believe that this is not rejection but could be infectious process, recommended consulting Transplant Infectious Disease.    - Trending down of platelets (108) and hemoglobin (8.6), although close to baseline established in mid-2016. Subcutaneous heparin discontinued. Will continue to trend CBC.  - Tacrolimus 4.1 (decreased), sirolimus 7.5. Will continue to consult Transplant Nephrology. Lasix 20mg IV given per Nephrology   - BK virus quant negative, HIV screen nonreactive, CMV not detected, HepC antibody nonreactive, and HepB sAG nonreactive. EBV quant pending.     # Altered mental status in setting of UTI vs metabolic encephalopathy  Patient altered mental status improving from admission per patient and her .   - Folate, B12, CK-Total, TSH = 1.20,  T4 = 0.72 (decreased), B12 = 1402 (increased), Folate = 5.2 (decreased), CK-Total = 36, RPR = negative, PTH = 334  - 1,25 Dihydroxyvitamin D, and 25 Hydroxyvitamin D2/D3 pending.   - Incentive spirometer in use.   - Treatment of UTI to rule out infectious etiology (see below)  - Continue trending BMP to rule out electrolyte disturbances  - Gabapentin lowered to 300mg QHS. Will consider  discontinuing Clonidine in order to limit pharmaceutical manifestations of AMS in outpatient setting.   - Occupational therapy noted no cognitive impairment. Continued work with PT/OT to increase range of motion and mobility.     # Sacral ulcer  Seen on previous admit in February 2017.   - Appreciate dressing and recs from Wound Care Nurse     # Urinary tract infection  Cloudy urine identified upon admission. Concerning for UTI due to previous presence of WBCs and leukocyte esterase.   - Chronic indwelling Johnson exchanged 3/9/17. Planning for replacement on again Monday (3/13/17).   - Blood cultures (3/8/17) - Negative  - Started on Amoxicillin per Enterococcus faecalis sensitivities.   - Fluconazole 200mg QDay continued for yeast    # Hypertension  - Continue home clonidine and labetalol  - Hydralazine PRN    # HLD  - Continue home pravastatin     # Pain:   - Continue acetaminophen PRN  - Gabapentin lowered to 300mg QHS. Patient has no complaints of pain.    # Diarrhea:   - Continue lomotil PRN  - Continue imodium PRN    # Hypothyroid  - Continue synthroid  - Will monitor TSH/T4 levels    # Diabetes:   - Continue Lantus 10 units  - Medium SSI  - POC glucose checks before meals and bedtime    Summary  FEN: Renal Diet  Prophylaxis  - Isolation: Contact  - Lung: CPAP at night  - DVT: none  - GI: Pepcid  Code Status: FULL    Disposition: Pending workup for altered mental status / infection. Discussed her improvement during her admission with her  Keshawn.    Patient was seen and discussed with Dr. Roland Richard, DAVISS  Oral & Maxillofacial Surgery Resident  PGY-1  [phone]     Subjective (interval history):   Mrs. Jade had an uneventful evening. She states that she slept well and has had a good appetite. She denies trouble breathing, headache, dysphagia, or pain associated with her indwelling catheter. She continues to report that she feels her mental status is back to baseline, and denies having a  hallucination for three days. She has continued to meet with occupational therapy and was able to move to the chair.      Physical Exam:   Vitals:  Temp:  [97.8  F (36.6  C)-99.4  F (37.4  C)] 99  F (37.2  C)  Pulse:  [68-78] 74  Heart Rate:  [70-78] 78  Resp:  [16-18] 16  BP: (137-193)/(50-74) 147/54  SpO2:  [94 %-99 %] 96 %    Temp  Av.6  F (36.4  C)  Min: 96.2  F (35.7  C)  Max: 98.4  F (36.9  C)      Pulse  Av.5  Min: 57  Max: 87 Resp  Av.5  Min: 8  Max: 30  SpO2  Av.3 %  Min: 87 %  Max: 100 %  FiO2 (%)  Av %  Min: 40 %  Max: 40 %       Wt Readings from Last 4 Encounters:   17 91 kg (200 lb 9.6 oz)   17 93.1 kg (205 lb 4.8 oz)   16 91.4 kg (201 lb 8 oz)   16 90.7 kg (200 lb)       Intake/Output Summary (Last 24 hours) at 17 1311  Last data filed at 17 1119   Gross per 24 hour   Intake          2101.67 ml   Output             2330 ml   Net          -228.33 ml       General: Laying in bed, in no acute distress. Awake and alert upon entry.   Skin: Sacral wound noted, confined to epidermis.  HEENT: Atraumatic, normocephalic. Pupils equally round and reactive to light, EOMI. Anicteric sclera. No swelling or masses.   Respiratory: Non-labored breathing, full saturation on room air  : Johnson in place, urine pale yellow in collection bag  Extremities: Lower extremity edema. Upper extremities have equal strength and mobility.   Neurologic: A&O x 3 (oriented to self, place, and president, orientation to time improved from yesterday), speech normal         Laboratory Data:   ROUTINE ICU LABS (Last four results)  CMP    Recent Labs  Lab 17  0853 17  0859 03/10/17  0731 17  0750 17  1243    136 137 141 135   POTASSIUM 4.1 3.8 4.0 4.1 4.6   CHLORIDE 106 104 105 107 98   CO2 22 24 25 26 28   ANIONGAP 10 8 8 8 8   * 217* 214* 138* 61*   BUN 32* 33* 35* 39* 42*   CR 2.16* 2.40* 2.64* 2.94* 3.37*   GFRESTIMATED 23* 20* 18* 16* 13*    GFRESTBLACK 27* 24* 22* 19* 16*   ROGER 7.6* 8.1* 8.5 8.9 10.2*   PHOS  --   --   --  3.2  --    PROTTOTAL  --   --   --   --  7.9   ALBUMIN  --   --   --  2.7* 3.0*   BILITOTAL  --   --   --   --  0.3   ALKPHOS  --   --   --   --  87   AST  --   --   --   --  13   ALT  --   --   --   --  <6     CBC    Recent Labs  Lab 03/12/17  0853 03/11/17  0859 03/10/17  0731 03/09/17  0750   WBC 4.7 4.2 4.3 3.5*   RBC 3.08* 3.17* 3.06* 3.34*   HGB 8.6* 8.9* 8.6* 9.4*   HCT 28.1* 29.1* 28.3* 30.6*   MCV 91 92 93 92   MCH 27.9 28.1 28.1 28.1   MCHC 30.6* 30.6* 30.4* 30.7*   RDW 15.4* 15.1* 15.2* 14.9   * 98* 115* 107*     INR    Recent Labs  Lab 03/08/17  1243   INR 1.01       Culture data:  Urine culture (3/8/2017) - Enterococcus faecalis isolated, with sensitivity to ampicillin, penicillin, nitrofurantoin, and vancomycin noted.   Blood cultures (3/8/2017) - Negative

## 2017-03-12 NOTE — PLAN OF CARE
Problem: Goal Outcome Summary  Goal: Goal Outcome Summary  OT/5A: patient transferred to [facility] with lift, tolerated 12 minutes up in chair and self propelled x5 ft x3 with rests. Returned to bed due to back pain and with onset n/v after rolling to remove sling in bed. Reports this is her first time up in [facility] in years.     REC: home with assist and further caregiver/patient training on use of home DME to decrease bed bound status

## 2017-03-12 NOTE — PROGRESS NOTES
"  Nephrology Progress Note  03/12/2017         Assessment & Recommendations:   Gem Jade is a 71 year old F with T2 DM , diabetic nephropathy, now s/p LDKT in 10/27/1999, with baseline Cr in 0.8-1, on sirolimus/tacrolimus/prednisone, admitted with AMS, found with ROB with a Cr of 3.3, and UA consistent with UTI. Uc postivive for enterococcus.     1. LDKT: with ROB,  prerenal and UTI.  U/S with resolved hydronephrosis. Low tacrolimus level. Cr improving but still elevated.     2. Immunosuppression:  tacrolimus and sirolimus 12 hours level is low. Restarted on Fluconazole.  Repeat Tac and Sirolimus  level are pending  3. BP/ volume status: elevated BP, possible volume related. On Labetalol 200 mg BID and Clonidine 0.1 mg BID.   4. CKD- MB: hypercalcemia, with low Vit D. Please send PHT, Vit D  5.Anemia: Hg stable  6. UTI: with chronic Johnson, now with Uc positive for enterococcus and yeast. Treated with Amoxcillin and Fluconazole. Please change the Johnson after 3 days of treatment with Fluconazole.     Recommendations were communicated to primary team via this note.     Matt Parra MD       Interval History :   In the last 24 hours Gem Jade has been feeling better. Good Uout, Cr down trending but higher than her baseline.   Review of Systems:   4 pt ROS reviewed alone and are all negative     Physical Exam:   I/O last 3 completed shifts:  In: 480 [P.O.:480]  Out: 1425 [Urine:1425]   /70 (BP Location: Left arm)  Pulse 74  Temp 99  F (37.2  C) (Oral)  Resp 18  Ht 1.702 m (5' 7\")  Wt 91 kg (200 lb 9.6 oz)  SpO2 96%  BMI 31.42 kg/m2     GENERAL APPEARANCE: lying in bed   PULM: lungs clear to auscultation,  bilaterally, equal air movement, no clubbing  CV: regular rhythm, normal rate, no rub     -JVP no      -edema trace lower extremity edema    GI: soft, non tender, non distended, bowel sounds are  Normal   NEURO:  No  asterixis       Labs:   All labs reviewed by me  Electrolytes/Renal -   Recent " Labs   Lab Test  03/12/17   0853  03/11/17   0859  03/10/17   0731  03/09/17   0750   02/17/17   0644  02/16/17   2303   09/05/16 0617 09/04/16   0815   NA  138  136  137  141   < >  135  131*   < >  135  136   POTASSIUM  4.1  3.8  4.0  4.1   < >  4.4   --    < >  4.0  4.0   CHLORIDE  106  104  105  107   < >  97   --    < >  103  104   CO2  22  24  25  26   < >  25   --    < >  21  22   BUN  32*  33*  35*  39*   < >  40*   --    < >  16  12   CR  2.16*  2.40*  2.64*  2.94*   < >  1.05*   --    < >  0.54  0.58   GLC  225*  217*  214*  138*   < >  354*   --    < >  177*  178*   ROGER  7.6*  8.1*  8.5  8.9   < >  11.2*   --    < >  8.4*  8.7   MAG   --    --    --    --    --    --   2.0   --   2.2  1.4*   PHOS   --    --    --   3.2   --   3.2  2.9   --    --    --     < > = values in this interval not displayed.       CBC -   Recent Labs   Lab Test  03/12/17   0853  03/11/17   0859  03/10/17   0731   WBC  4.7  4.2  4.3   HGB  8.6*  8.9*  8.6*   PLT  108*  98*  115*       LFTs -   Recent Labs   Lab Test  03/09/17   0750  03/08/17   1243  02/16/17 2007 09/05/16   0617   ALKPHOS   --   87  80  53   BILITOTAL   --   0.3  0.5  0.9   ALT   --   <6  14  10   AST   --   13  9  15   PROTTOTAL   --   7.9  8.2  7.3   ALBUMIN  2.7*  3.0*  2.8*  2.6*       Iron Panel -   Recent Labs   Lab Test  02/17/17   2358  09/04/16   0815  08/07/16   0750   IRON  50  124  12*   IRONSAT  29  97*  7*   GIANA  1069*  2676*  645*         Imaging:  All imaging studies reviewed by me.     Current Medications:    insulin glargine  12 Units Subcutaneous At Bedtime     amoxicillin  500 mg Oral Q12H HO     gabapentin  300 mg Oral At Bedtime     fluconazole  200 mg Oral Daily     tacrolimus  1 mg Oral BID     famotidine  20 mg Oral Daily     cloNIDine  0.1 mg Oral BID     labetalol  200 mg Oral BID     levothyroxine  50 mcg Oral Daily     predniSONE  2.5 mg Oral Daily     pravastatin  10 mg Oral Daily     insulin aspart  1-7 Units Subcutaneous TID  AC     insulin aspart  1-5 Units Subcutaneous At Bedtime     sirolimus  1.5 mg Oral Daily        Matt Parra MD

## 2017-03-12 NOTE — PLAN OF CARE
Problem: Goal Outcome Summary  Goal: Goal Outcome Summary  PT 5A: therapeutic listening provided during session as patient describes desires to get out of bed more often at home, patient has all equipment necessary in order to be in wheelchair throughout day at home, however patient describes guilt as her  is required to do all the cooking/cleaning and she feels she is a burden. Discussed concerns with OT; OT to continue following for primary concerns of lift equipment to encourage increased time in upright. PT will follow to continue VOR exercises for accomodation to environment. Patient completes VOR exercise x~8 minutes with rest breaks, minimal complaints of nausea though reporting has received Zofran this AM.      Anticipate discharge home with assist from ; would benefit from additional caregiver services, if available, in order to decrease caregiver burden.

## 2017-03-12 NOTE — PLAN OF CARE
Problem: Goal Outcome Summary  Goal: Goal Outcome Summary  Outcome: Improving  VSS except elevated BPs at baseline, parameters for PRN hydralazine not met. A/O but forgetful. Denies pain, denies nausea.  at 0200. Johnson cath intact, patent. No BM overnight. Turn/repositioning with assist of 1 q2h. PIV saline locked, calling appropriately. Continue to monitor and follow POC.

## 2017-03-13 LAB
ANION GAP SERPL CALCULATED.3IONS-SCNC: 9 MMOL/L (ref 3–14)
BUN SERPL-MCNC: 32 MG/DL (ref 7–30)
CALCIUM SERPL-MCNC: 7.8 MG/DL (ref 8.5–10.1)
CHLORIDE SERPL-SCNC: 104 MMOL/L (ref 94–109)
CO2 SERPL-SCNC: 22 MMOL/L (ref 20–32)
CREAT SERPL-MCNC: 2 MG/DL (ref 0.52–1.04)
EBV DNA # SPEC NAA+PROBE: ABNORMAL {COPIES}/ML
EBV DNA SPEC NAA+PROBE-LOG#: 5 {LOG_COPIES}/ML
ERYTHROCYTE [DISTWIDTH] IN BLOOD BY AUTOMATED COUNT: 15.4 % (ref 10–15)
GFR SERPL CREATININE-BSD FRML MDRD: 25 ML/MIN/1.7M2
GLUCOSE BLDC GLUCOMTR-MCNC: 175 MG/DL (ref 70–99)
GLUCOSE BLDC GLUCOMTR-MCNC: 235 MG/DL (ref 70–99)
GLUCOSE BLDC GLUCOMTR-MCNC: 237 MG/DL (ref 70–99)
GLUCOSE BLDC GLUCOMTR-MCNC: 272 MG/DL (ref 70–99)
GLUCOSE BLDC GLUCOMTR-MCNC: 321 MG/DL (ref 70–99)
GLUCOSE SERPL-MCNC: 244 MG/DL (ref 70–99)
HCT VFR BLD AUTO: 28.5 % (ref 35–47)
HGB BLD-MCNC: 8.8 G/DL (ref 11.7–15.7)
MCH RBC QN AUTO: 28.5 PG (ref 26.5–33)
MCHC RBC AUTO-ENTMCNC: 30.9 G/DL (ref 31.5–36.5)
MCV RBC AUTO: 92 FL (ref 78–100)
PLATELET # BLD AUTO: 99 10E9/L (ref 150–450)
POTASSIUM SERPL-SCNC: 3.8 MMOL/L (ref 3.4–5.3)
RBC # BLD AUTO: 3.09 10E12/L (ref 3.8–5.2)
SIROLIMUS BLD-MCNC: 5.1 UG/L (ref 5–15)
SODIUM SERPL-SCNC: 135 MMOL/L (ref 133–144)
TACROLIMUS BLD-MCNC: 3.5 UG/L (ref 5–15)
TME LAST DOSE: ABNORMAL H
TME LAST DOSE: NORMAL H
WBC # BLD AUTO: 3.8 10E9/L (ref 4–11)

## 2017-03-13 PROCEDURE — 25000131 ZZH RX MED GY IP 250 OP 636 PS 637: Mod: GY | Performed by: HOSPITALIST

## 2017-03-13 PROCEDURE — 25000132 ZZH RX MED GY IP 250 OP 250 PS 637: Mod: GY | Performed by: INTERNAL MEDICINE

## 2017-03-13 PROCEDURE — 85027 COMPLETE CBC AUTOMATED: CPT | Performed by: STUDENT IN AN ORGANIZED HEALTH CARE EDUCATION/TRAINING PROGRAM

## 2017-03-13 PROCEDURE — 80197 ASSAY OF TACROLIMUS: CPT | Performed by: STUDENT IN AN ORGANIZED HEALTH CARE EDUCATION/TRAINING PROGRAM

## 2017-03-13 PROCEDURE — 12000001 ZZH R&B MED SURG/OB UMMC

## 2017-03-13 PROCEDURE — 25000132 ZZH RX MED GY IP 250 OP 250 PS 637: Mod: GY | Performed by: STUDENT IN AN ORGANIZED HEALTH CARE EDUCATION/TRAINING PROGRAM

## 2017-03-13 PROCEDURE — A9270 NON-COVERED ITEM OR SERVICE: HCPCS | Mod: GY | Performed by: INTERNAL MEDICINE

## 2017-03-13 PROCEDURE — 36415 COLL VENOUS BLD VENIPUNCTURE: CPT | Performed by: STUDENT IN AN ORGANIZED HEALTH CARE EDUCATION/TRAINING PROGRAM

## 2017-03-13 PROCEDURE — 00000146 ZZHCL STATISTIC GLUCOSE BY METER IP

## 2017-03-13 PROCEDURE — 80048 BASIC METABOLIC PNL TOTAL CA: CPT | Performed by: STUDENT IN AN ORGANIZED HEALTH CARE EDUCATION/TRAINING PROGRAM

## 2017-03-13 PROCEDURE — 25900017 H RX MED GY IP 259 OP 259 PS 637: Mod: GY | Performed by: INTERNAL MEDICINE

## 2017-03-13 PROCEDURE — 80195 ASSAY OF SIROLIMUS: CPT | Performed by: STUDENT IN AN ORGANIZED HEALTH CARE EDUCATION/TRAINING PROGRAM

## 2017-03-13 PROCEDURE — 25000131 ZZH RX MED GY IP 250 OP 636 PS 637: Mod: GY | Performed by: STUDENT IN AN ORGANIZED HEALTH CARE EDUCATION/TRAINING PROGRAM

## 2017-03-13 PROCEDURE — 99233 SBSQ HOSP IP/OBS HIGH 50: CPT | Mod: GC | Performed by: INTERNAL MEDICINE

## 2017-03-13 PROCEDURE — 25000125 ZZHC RX 250: Performed by: INTERNAL MEDICINE

## 2017-03-13 PROCEDURE — A9270 NON-COVERED ITEM OR SERVICE: HCPCS | Mod: GY | Performed by: STUDENT IN AN ORGANIZED HEALTH CARE EDUCATION/TRAINING PROGRAM

## 2017-03-13 RX ADMIN — PRAVASTATIN SODIUM 10 MG: 10 TABLET ORAL at 09:49

## 2017-03-13 RX ADMIN — TACROLIMUS 1 MG: 1 CAPSULE ORAL at 19:44

## 2017-03-13 RX ADMIN — MECLIZINE 12.5 MG: 12.5 TABLET ORAL at 21:33

## 2017-03-13 RX ADMIN — LABETALOL HCL 200 MG: 200 TABLET, FILM COATED ORAL at 09:51

## 2017-03-13 RX ADMIN — GABAPENTIN 300 MG: 300 CAPSULE ORAL at 21:34

## 2017-03-13 RX ADMIN — AMOXICILLIN 500 MG: 250 CAPSULE ORAL at 19:43

## 2017-03-13 RX ADMIN — INSULIN ASPART 2 UNITS: 100 INJECTION, SOLUTION INTRAVENOUS; SUBCUTANEOUS at 09:52

## 2017-03-13 RX ADMIN — FAMOTIDINE 20 MG: 20 TABLET ORAL at 09:51

## 2017-03-13 RX ADMIN — AMOXICILLIN 500 MG: 250 CAPSULE ORAL at 09:48

## 2017-03-13 RX ADMIN — CLONIDINE HYDROCHLORIDE 0.1 MG: 0.1 TABLET ORAL at 09:52

## 2017-03-13 RX ADMIN — TACROLIMUS 1 MG: 1 CAPSULE ORAL at 09:49

## 2017-03-13 RX ADMIN — LIDOCAINE HYDROCHLORIDE 10 ML: 20 JELLY TOPICAL at 13:52

## 2017-03-13 RX ADMIN — INSULIN ASPART 2 UNITS: 100 INJECTION, SOLUTION INTRAVENOUS; SUBCUTANEOUS at 17:54

## 2017-03-13 RX ADMIN — INSULIN ASPART 1 UNITS: 100 INJECTION, SOLUTION INTRAVENOUS; SUBCUTANEOUS at 13:02

## 2017-03-13 RX ADMIN — LEVOTHYROXINE SODIUM 50 MCG: 50 TABLET ORAL at 09:50

## 2017-03-13 RX ADMIN — MECLIZINE 12.5 MG: 12.5 TABLET ORAL at 09:52

## 2017-03-13 RX ADMIN — LABETALOL HCL 200 MG: 200 TABLET, FILM COATED ORAL at 19:46

## 2017-03-13 RX ADMIN — FLUCONAZOLE 200 MG: 200 TABLET ORAL at 09:47

## 2017-03-13 RX ADMIN — PREDNISONE 2.5 MG: 2.5 TABLET ORAL at 09:51

## 2017-03-13 RX ADMIN — CLONIDINE HYDROCHLORIDE 0.1 MG: 0.1 TABLET ORAL at 19:44

## 2017-03-13 RX ADMIN — SIROLIMUS 1.5 MG: 1 TABLET, SUGAR COATED ORAL at 09:44

## 2017-03-13 NOTE — PLAN OF CARE
Problem: Goal Outcome Summary  Goal: Goal Outcome Summary  Outcome: Improving  A&Ox4. VSS elevated BP in 170s/60s, scheduled BP meds given. On RA. Repo q2h. No BM this shift. Rico patent, adequate output. Tolerating diet. PO abx given.  & 279 managed with scheduled insulin. Denies pain, denies nausea. Mepliex on coccyx CDI.  Plan for new rico to be placed tomorrow and DC home when medically stable. Will continue to monitor and follow POC.

## 2017-03-13 NOTE — PROGRESS NOTES
"  Nephrology Progress Note  03/13/2017         Assessment & Recommendations:   Gem Jade is a 71 year old F with T2 DM , diabetic nephropathy, now s/p LDKT in 10/27/1999, with baseline Cr in 0.8-1, on sirolimus/tacrolimus/prednisone, admitted with AMS, found with ROB with a Cr of 3.3, and UA consistent with UTI. Uc postivive for enterococcus.     1. LDKT: with ROB,  prerenal and UTI.  U/S with resolved hydronephrosis. Low tacrolimus level. Kidney function continues to slowly improve with creatinine down to 2.0.  Good urine output.  2. Immunosuppression:  tacrolimus and sirolimus 12 hours level is low. Restarted on Fluconazole.  Repeat Tac and Sirolimus  level are pending  3. HTN - okay to slightly elevated BP. On Labetalol 200 mg BID and Clonidine 0.1 mg BID.  Would follow for now.  4. CKD- MB: hypercalcemia, with low Vit D. Please send PTH and Vit D  5. Anemia: stable Hgb.  6. UTI: with chronic Johnson, now with UCx positive for enterococcus and yeast. Treated with Amoxcillin and Fluconazole. Please change the Johnson after 3 days of treatment with Fluconazole.     Recommendations were communicated to primary team via this note.     Ross Elizabeth MD       Interval History :   In the last 24 hours Gem Jade's kidney function continues to slowly improve with creatinine ~ 2.  Good urine output.  She reports an episode of vomiting with vertigo symptoms earlier today, but otherwise no nausea or vomiting.  No diarrhea.  No fever, sweats or chills.  No chest pain or shortness of breath.  No leg swelling.  She feels her thinking is normal.    Review of Systems:   4 point ROS was obtained and negative, except as noted in Interval History.    Physical Exam:   I/O last 3 completed shifts:  In: 480 [P.O.:480]  Out: 2175 [Urine:2175]   /64  Pulse 66  Temp 98  F (36.7  C) (Oral)  Resp 16  Ht 1.702 m (5' 7\")  Wt 91 kg (200 lb 9.6 oz)  SpO2 98%  BMI 31.42 kg/m2     GENERAL APPEARANCE: lying in bed  HENT: no " mouth sores  PULM: lungs clear to auscultation bilaterally, equal air movement  CV: regular rhythm, normal rate     - no LE edema bilaterally   GI: soft, nontender, nondistended, bowel sounds are positive, morbidly obese   TX KIDNEY: normal      Labs:   All labs reviewed by me  Electrolytes/Renal -   Recent Labs   Lab Test  03/13/17   0743  03/12/17   0853  03/11/17   0859   03/09/17   0750   02/17/17   0644  02/16/17   2303   09/05/16   0617  09/04/16   0815   NA  135  138  136   < >  141   < >  135  131*   < >  135  136   POTASSIUM  3.8  4.1  3.8   < >  4.1   < >  4.4   --    < >  4.0  4.0   CHLORIDE  104  106  104   < >  107   < >  97   --    < >  103  104   CO2  22  22  24   < >  26   < >  25   --    < >  21  22   BUN  32*  32*  33*   < >  39*   < >  40*   --    < >  16  12   CR  2.00*  2.16*  2.40*   < >  2.94*   < >  1.05*   --    < >  0.54  0.58   GLC  244*  225*  217*   < >  138*   < >  354*   --    < >  177*  178*   ROGER  7.8*  7.6*  8.1*   < >  8.9   < >  11.2*   --    < >  8.4*  8.7   MAG   --    --    --    --    --    --    --   2.0   --   2.2  1.4*   PHOS   --    --    --    --   3.2   --   3.2  2.9   --    --    --     < > = values in this interval not displayed.       CBC -   Recent Labs   Lab Test  03/13/17   0743  03/12/17   0853  03/11/17   0859   WBC  3.8*  4.7  4.2   HGB  8.8*  8.6*  8.9*   PLT  99*  108*  98*       LFTs -   Recent Labs   Lab Test  03/09/17   0750  03/08/17   1243  02/16/17 2007 09/05/16   0617   ALKPHOS   --   87  80  53   BILITOTAL   --   0.3  0.5  0.9   ALT   --   <6  14  10   AST   --   13  9  15   PROTTOTAL   --   7.9  8.2  7.3   ALBUMIN  2.7*  3.0*  2.8*  2.6*       Iron Panel -   Recent Labs   Lab Test  02/17/17   2358  09/04/16   0815  08/07/16   0750   IRON  50  124  12*   IRONSAT  29  97*  7*   GIANA  1069*  2676*  645*     Imaging:  All imaging studies reviewed by me.     Current Medications:    lidocaine 2 %  10 mL Urethral Once     meclizine  12.5 mg Oral TID      insulin glargine  12 Units Subcutaneous At Bedtime     amoxicillin  500 mg Oral Q12H HO     gabapentin  300 mg Oral At Bedtime     fluconazole  200 mg Oral Daily     tacrolimus  1 mg Oral BID     famotidine  20 mg Oral Daily     cloNIDine  0.1 mg Oral BID     labetalol  200 mg Oral BID     levothyroxine  50 mcg Oral Daily     predniSONE  2.5 mg Oral Daily     pravastatin  10 mg Oral Daily     insulin aspart  1-7 Units Subcutaneous TID AC     insulin aspart  1-5 Units Subcutaneous At Bedtime     sirolimus  1.5 mg Oral Daily        Ross Elizabeth MD

## 2017-03-13 NOTE — PLAN OF CARE
Problem: Goal Outcome Summary  Goal: Goal Outcome Summary  Outcome: Improving  Pt here with an ROB and UTI.  Creatinine improving at 2.16, rico needs to be replaced today and patient requesting lidocaine before insertion.  Turned every 2 hours.  Pt on bedrest, did sit in the chair and became meclizine.  VSS. Denies pain and nausea.  Most likely discharge to home with home care today or tomorrow.

## 2017-03-13 NOTE — PLAN OF CARE
Problem: Goal Outcome Summary  Goal: Goal Outcome Summary  Outcome: No Change  A&O but forgetful. VSS except HTN. Denies pain. Johnson exchanged today. PO ABX given as scheduled. Trending creatinine until closer to baseline. PT/OT consulted. Refused Q2H repo. BG checked with meals. Tolerating diet.  updated. Will continue with POC.

## 2017-03-13 NOTE — PROGRESS NOTES
Pt is A&O. VSS ex HTN. Catheter exchanged 16 Fr inserted, denies pain. Sacral dressing changed, CDI. Antibiotics given at scheduled time. No BM. Waiting for creat. to reach baseline. Pt refused N2lahvs repo. Tolerating diet, blood sugar checked with meals. Will continue with POC.

## 2017-03-13 NOTE — PROGRESS NOTES
Medicine Daily Progress Note   03/13/2017    Gem Jade MRN# [id number]   Age: 71 year old  Sex: female YOB: 1945  Date of Admissions: 3/8/2017          Assessment and Plan:   72 y/o female with PMH significant for kidney transplant (1999), complex UTIs, morbid obesity, mobility issues, fatigue, DM-II, chronic pain, hypothyroidism, HTN, and HLD who was admitted for acute kidney injury and altered mental status    # ROB in setting of UTI vs transplant rejection  Creatinine 3.49 on 3/6/2017, trending down to 2.00 today, still up from baseline of 0.8 - 1.0.   - Appreciate transplant nephrology, who believe that this is not rejection but could be infectious process. Recommended viral work-up and tacrolimus/sirolimus level.   - Initial trending down of platelets (99) and hemoglobin (8.8), although close to baseline established in mid-2016. Will continue to trend CBC.  - Tacrolimus 3.5 (decreased), sirolimus 5.1. Will continue to consult Transplant Nephrology.   - BK virus quant negative, HIV screen nonreactive, CMV not detected, HepC antibody nonreactive, and HepB sAG nonreactive. EBV quant pending.     # Altered mental status in setting of UTI vs metabolic encephalopathy  Patient altered mental status improving from admission per patient and her .   - Folate, B12, CK-Total, TSH = 1.20,  T4 = 0.72 (decreased), B12 = 1402 (increased), Folate = 5.2 (decreased), CK-Total = 36, RPR = negative, PTH = 334  - 1,25 Dihydroxyvitamin D, and 25 Hydroxyvitamin D2/D3 pending.   - Incentive spirometer in use.   - Treatment of UTI to rule out infectious etiology (see below)  - Continue trending BMP to rule out electrolyte disturbances  - Gabapentin lowered to 300mg QHS. Will consider discontinuing Clonidine in order to limit pharmaceutical manifestations of AMS in outpatient setting.   - Occupational therapy noted no cognitive impairment. Continued work with PT/OT to increase range of motion and mobility.      # Sacral ulcer  Seen on previous admit in February 2017.   - Appreciate dressing and recs from Wound Care Nurse     # Urinary tract infection  Cloudy urine identified upon admission. Concerning for UTI due to previous presence of WBCs and leukocyte esterase.   - Chronic indwelling Johnson exchanged 3/9/17. Replaced again today in after three days of antibiotic / antifungal meds (3/13/17).   - Blood cultures (3/8/17) - Negative  - Started on Amoxicillin per Enterococcus faecalis sensitivities.    - Fluconazole 200mg QDay continued for yeast coverage.     # Hypertension  - Continue home clonidine and labetalol  - Hydralazine PRN    # HLD  - Continue home pravastatin     # Pain:   - Continue acetaminophen PRN  - Gabapentin lowered to 300mg QHS. Patient has no complaints of pain.    # Diarrhea:   - Continue lomotil PRN  - Continue imodium PRN    # Hypothyroid  - Continue synthroid    # Diabetes:   - Continue Lantus 10 units  - Medium SSI  - POC glucose checks before meals and bedtime    Summary  FEN: Renal Diet  Prophylaxis  - Isolation: Contact  - Lung: CPAP at night  - DVT: none  - GI: Pepcid  Code Status: FULL    Disposition: Pending workup for altered mental status / infection. Discussed her improvement during her admission with her  Keshawn. Planning on discharge to home sometime this week.    Patient was seen and discussed with Dr. Roland Richard DDS  Oral & Maxillofacial Surgery Resident  PGY-1  [phone]     Subjective (interval history):   Mrs. Jade had an uneventful evening. She states that she has had some right sided back pain that feels musculoskeletal. She attributes this to her activity associated with PT/OT. She continues to report that her mental status is back to baseline. She denies having a hallucination for four days.      Physical Exam:   Vitals:  Temp:  [96.1  F (35.6  C)-98.9  F (37.2  C)] 98  F (36.7  C)  Pulse:  [65-77] 66  Heart Rate:  [69-74] 74  Resp:  [15-18] 16  BP:  (137-171)/(56-94) 158/64  SpO2:  [96 %-98 %] 98 %    Temp  Av.6  F (36.4  C)  Min: 96.2  F (35.7  C)  Max: 98.4  F (36.9  C)      Pulse  Av.5  Min: 57  Max: 87 Resp  Av.5  Min: 8  Max: 30  SpO2  Av.3 %  Min: 87 %  Max: 100 %  FiO2 (%)  Av %  Min: 40 %  Max: 40 %       Wt Readings from Last 4 Encounters:   17 91 kg (200 lb 9.6 oz)   17 93.1 kg (205 lb 4.8 oz)   16 91.4 kg (201 lb 8 oz)   16 90.7 kg (200 lb)       Intake/Output Summary (Last 24 hours) at 17 1311  Last data filed at 17 1119   Gross per 24 hour   Intake          2101.67 ml   Output             2330 ml   Net          -228.33 ml       General: Laying in bed, in no acute distress. Awake and alert upon entry.   Skin: Sacral wound noted, confined to epidermis.  HEENT: Atraumatic, normocephalic. Pupils equally round and reactive to light, EOMI. Anicteric sclera. No swelling or masses.   Respiratory: Non-labored breathing, full saturation on room air  : Johnson in place, urine pale yellow in collection bag  Extremities: Lower extremity edema. Upper extremities have equal strength and mobility.   Neurologic: A&O x 3 (oriented to self, place, and president, orientation to time improved from yesterday), speech normal         Laboratory Data:   ROUTINE ICU LABS (Last four results)  CMP    Recent Labs  Lab 17  0743 17  0853 17  0859 03/10/17  0731 17  0750 17  1243    138 136 137 141 135   POTASSIUM 3.8 4.1 3.8 4.0 4.1 4.6   CHLORIDE 104 106 104 105 107 98   CO2 22 22 24 25 26 28   ANIONGAP 9 10 8 8 8 8   * 225* 217* 214* 138* 61*   BUN 32* 32* 33* 35* 39* 42*   CR 2.00* 2.16* 2.40* 2.64* 2.94* 3.37*   GFRESTIMATED 25* 23* 20* 18* 16* 13*   GFRESTBLACK 30* 27* 24* 22* 19* 16*   ROGER 7.8* 7.6* 8.1* 8.5 8.9 10.2*   PHOS  --   --   --   --  3.2  --    PROTTOTAL  --   --   --   --   --  7.9   ALBUMIN  --   --   --   --  2.7* 3.0*   BILITOTAL  --   --   --   --   --   0.3   ALKPHOS  --   --   --   --   --  87   AST  --   --   --   --   --  13   ALT  --   --   --   --   --  <6     CBC    Recent Labs  Lab 03/13/17  0743 03/12/17  0853 03/11/17  0859 03/10/17  0731   WBC 3.8* 4.7 4.2 4.3   RBC 3.09* 3.08* 3.17* 3.06*   HGB 8.8* 8.6* 8.9* 8.6*   HCT 28.5* 28.1* 29.1* 28.3*   MCV 92 91 92 93   MCH 28.5 27.9 28.1 28.1   MCHC 30.9* 30.6* 30.6* 30.4*   RDW 15.4* 15.4* 15.1* 15.2*   PLT 99* 108* 98* 115*     INR    Recent Labs  Lab 03/08/17  1243   INR 1.01       Culture data:  Urine culture (3/8/2017) - Enterococcus faecalis isolated, with sensitivity to ampicillin, penicillin, nitrofurantoin, and vancomycin noted.   Blood cultures (3/8/2017) - Negative

## 2017-03-14 ENCOUNTER — APPOINTMENT (OUTPATIENT)
Dept: PHYSICAL THERAPY | Facility: CLINIC | Age: 72
DRG: 698 | End: 2017-03-14
Payer: MEDICARE

## 2017-03-14 LAB
ANION GAP SERPL CALCULATED.3IONS-SCNC: 10 MMOL/L (ref 3–14)
BACTERIA SPEC CULT: NO GROWTH
BACTERIA SPEC CULT: NO GROWTH
BUN SERPL-MCNC: 29 MG/DL (ref 7–30)
CALCIUM SERPL-MCNC: 8 MG/DL (ref 8.5–10.1)
CHLORIDE SERPL-SCNC: 107 MMOL/L (ref 94–109)
CO2 SERPL-SCNC: 21 MMOL/L (ref 20–32)
CREAT SERPL-MCNC: 1.82 MG/DL (ref 0.52–1.04)
DEPRECATED CALCIDIOL+CALCIFEROL SERPL-MC: NORMAL UG/L (ref 20–75)
ERYTHROCYTE [DISTWIDTH] IN BLOOD BY AUTOMATED COUNT: 15.4 % (ref 10–15)
GFR SERPL CREATININE-BSD FRML MDRD: 27 ML/MIN/1.7M2
GLUCOSE BLDC GLUCOMTR-MCNC: 151 MG/DL (ref 70–99)
GLUCOSE BLDC GLUCOMTR-MCNC: 191 MG/DL (ref 70–99)
GLUCOSE BLDC GLUCOMTR-MCNC: 203 MG/DL (ref 70–99)
GLUCOSE BLDC GLUCOMTR-MCNC: 227 MG/DL (ref 70–99)
GLUCOSE BLDC GLUCOMTR-MCNC: 374 MG/DL (ref 70–99)
GLUCOSE SERPL-MCNC: 203 MG/DL (ref 70–99)
HCT VFR BLD AUTO: 28.3 % (ref 35–47)
HGB BLD-MCNC: 8.7 G/DL (ref 11.7–15.7)
MCH RBC QN AUTO: 28.2 PG (ref 26.5–33)
MCHC RBC AUTO-ENTMCNC: 30.7 G/DL (ref 31.5–36.5)
MCV RBC AUTO: 92 FL (ref 78–100)
MICRO REPORT STATUS: NORMAL
MICRO REPORT STATUS: NORMAL
PLATELET # BLD AUTO: 103 10E9/L (ref 150–450)
POTASSIUM SERPL-SCNC: 3.8 MMOL/L (ref 3.4–5.3)
RBC # BLD AUTO: 3.09 10E12/L (ref 3.8–5.2)
SIROLIMUS BLD-MCNC: 6.7 UG/L (ref 5–15)
SODIUM SERPL-SCNC: 138 MMOL/L (ref 133–144)
SPECIMEN SOURCE: NORMAL
SPECIMEN SOURCE: NORMAL
TACROLIMUS BLD-MCNC: 5.1 UG/L (ref 5–15)
TME LAST DOSE: NORMAL H
TME LAST DOSE: NORMAL H
VITAMIN D2 SERPL-MCNC: <5 UG/L
VITAMIN D3 SERPL-MCNC: 43 UG/L
WBC # BLD AUTO: 5.1 10E9/L (ref 4–11)

## 2017-03-14 PROCEDURE — 12000001 ZZH R&B MED SURG/OB UMMC

## 2017-03-14 PROCEDURE — 85027 COMPLETE CBC AUTOMATED: CPT | Performed by: STUDENT IN AN ORGANIZED HEALTH CARE EDUCATION/TRAINING PROGRAM

## 2017-03-14 PROCEDURE — A9270 NON-COVERED ITEM OR SERVICE: HCPCS | Mod: GY | Performed by: STUDENT IN AN ORGANIZED HEALTH CARE EDUCATION/TRAINING PROGRAM

## 2017-03-14 PROCEDURE — 25000132 ZZH RX MED GY IP 250 OP 250 PS 637: Mod: GY | Performed by: INTERNAL MEDICINE

## 2017-03-14 PROCEDURE — 25900017 H RX MED GY IP 259 OP 259 PS 637: Mod: GY | Performed by: INTERNAL MEDICINE

## 2017-03-14 PROCEDURE — 80197 ASSAY OF TACROLIMUS: CPT | Performed by: STUDENT IN AN ORGANIZED HEALTH CARE EDUCATION/TRAINING PROGRAM

## 2017-03-14 PROCEDURE — 80195 ASSAY OF SIROLIMUS: CPT | Performed by: STUDENT IN AN ORGANIZED HEALTH CARE EDUCATION/TRAINING PROGRAM

## 2017-03-14 PROCEDURE — 25000125 ZZHC RX 250: Performed by: INTERNAL MEDICINE

## 2017-03-14 PROCEDURE — 36415 COLL VENOUS BLD VENIPUNCTURE: CPT | Performed by: STUDENT IN AN ORGANIZED HEALTH CARE EDUCATION/TRAINING PROGRAM

## 2017-03-14 PROCEDURE — 80048 BASIC METABOLIC PNL TOTAL CA: CPT | Performed by: STUDENT IN AN ORGANIZED HEALTH CARE EDUCATION/TRAINING PROGRAM

## 2017-03-14 PROCEDURE — 25000132 ZZH RX MED GY IP 250 OP 250 PS 637: Mod: GY | Performed by: STUDENT IN AN ORGANIZED HEALTH CARE EDUCATION/TRAINING PROGRAM

## 2017-03-14 PROCEDURE — A9270 NON-COVERED ITEM OR SERVICE: HCPCS | Mod: GY | Performed by: INTERNAL MEDICINE

## 2017-03-14 PROCEDURE — 00000146 ZZHCL STATISTIC GLUCOSE BY METER IP

## 2017-03-14 PROCEDURE — 25000131 ZZH RX MED GY IP 250 OP 636 PS 637: Mod: GY | Performed by: STUDENT IN AN ORGANIZED HEALTH CARE EDUCATION/TRAINING PROGRAM

## 2017-03-14 PROCEDURE — 99232 SBSQ HOSP IP/OBS MODERATE 35: CPT | Mod: GC | Performed by: INTERNAL MEDICINE

## 2017-03-14 PROCEDURE — 40000193 ZZH STATISTIC PT WARD VISIT: Performed by: REHABILITATION PRACTITIONER

## 2017-03-14 PROCEDURE — 97530 THERAPEUTIC ACTIVITIES: CPT | Mod: GP | Performed by: REHABILITATION PRACTITIONER

## 2017-03-14 PROCEDURE — 25000131 ZZH RX MED GY IP 250 OP 636 PS 637: Mod: GY | Performed by: HOSPITALIST

## 2017-03-14 RX ADMIN — PREDNISONE 2.5 MG: 2.5 TABLET ORAL at 09:40

## 2017-03-14 RX ADMIN — FAMOTIDINE 20 MG: 20 TABLET ORAL at 09:39

## 2017-03-14 RX ADMIN — SIROLIMUS 1.5 MG: 1 TABLET, SUGAR COATED ORAL at 09:39

## 2017-03-14 RX ADMIN — MECLIZINE 12.5 MG: 12.5 TABLET ORAL at 20:35

## 2017-03-14 RX ADMIN — TACROLIMUS 1 MG: 1 CAPSULE ORAL at 20:34

## 2017-03-14 RX ADMIN — TACROLIMUS 1 MG: 1 CAPSULE ORAL at 09:39

## 2017-03-14 RX ADMIN — INSULIN ASPART 2 UNITS: 100 INJECTION, SOLUTION INTRAVENOUS; SUBCUTANEOUS at 18:08

## 2017-03-14 RX ADMIN — PRAVASTATIN SODIUM 10 MG: 10 TABLET ORAL at 09:40

## 2017-03-14 RX ADMIN — LABETALOL HCL 200 MG: 200 TABLET, FILM COATED ORAL at 20:35

## 2017-03-14 RX ADMIN — INSULIN ASPART 1 UNITS: 100 INJECTION, SOLUTION INTRAVENOUS; SUBCUTANEOUS at 13:19

## 2017-03-14 RX ADMIN — HYDRALAZINE HYDROCHLORIDE 10 MG: 20 INJECTION INTRAMUSCULAR; INTRAVENOUS at 22:25

## 2017-03-14 RX ADMIN — AMOXICILLIN 500 MG: 250 CAPSULE ORAL at 20:34

## 2017-03-14 RX ADMIN — CLONIDINE HYDROCHLORIDE 0.1 MG: 0.1 TABLET ORAL at 09:39

## 2017-03-14 RX ADMIN — LABETALOL HCL 200 MG: 200 TABLET, FILM COATED ORAL at 09:39

## 2017-03-14 RX ADMIN — GABAPENTIN 300 MG: 300 CAPSULE ORAL at 22:14

## 2017-03-14 RX ADMIN — LEVOTHYROXINE SODIUM 50 MCG: 50 TABLET ORAL at 09:40

## 2017-03-14 RX ADMIN — INSULIN ASPART 2 UNITS: 100 INJECTION, SOLUTION INTRAVENOUS; SUBCUTANEOUS at 09:40

## 2017-03-14 RX ADMIN — MECLIZINE 12.5 MG: 12.5 TABLET ORAL at 09:40

## 2017-03-14 RX ADMIN — AMOXICILLIN 500 MG: 250 CAPSULE ORAL at 09:39

## 2017-03-14 RX ADMIN — CLONIDINE HYDROCHLORIDE 0.1 MG: 0.1 TABLET ORAL at 20:36

## 2017-03-14 RX ADMIN — FLUCONAZOLE 200 MG: 200 TABLET ORAL at 09:39

## 2017-03-14 NOTE — PLAN OF CARE
Problem: Goal Outcome Summary  Goal: Goal Outcome Summary  Outcome: Improving  A&O but forgetful. VSS except HTN on RA. C/o back pain but declines intervention. Trending creatinine until closer to baseline=1. PO ABX given as scheduled. Declines repo. Johnson cares done. No BM. Plan for d/c when creatinine closer to baseline. Will continue with POC.

## 2017-03-14 NOTE — PLAN OF CARE
Problem: Goal Outcome Summary  Goal: Goal Outcome Summary  Outcome: No Change  Intermittent hypertension; order parameters note met for PRN hydralazine. CPAP used overnight.  A&O. Forgetful at times. Patient refusing T&R Q2hrs. Renal diet. No complaints of nausea, no emesis. Blood sugars of 321 and 227. Saline locked to PIV. Johnson in place for neurogenic bladder. No stools. Coccyx dressing intact. Complaints of back/buttocks pain; declined medication. PO antibiotics continued and creatinine trended. Plan to discharge home with  this week.

## 2017-03-14 NOTE — PLAN OF CARE
Problem: Goal Outcome Summary  Goal: Goal Outcome Summary  PT - per plan established by the Physical Therapist, according to functional mobility the  discharge recommendation is TCU for cont skilled PT to progress functional mobility. Pt not wanting to TCU, wants to return home. Pt needing min to mod A for all bed mob, and sitting balance. Pt able to sit for 9 min needing cont support. Pt mech lift to recliner.

## 2017-03-14 NOTE — PLAN OF CARE
Problem: Goal Outcome Summary  Goal: Goal Outcome Summary  OT/5A: Cancel OT due to patient refused.  Pt reports resting before lunch and not wanting to do any therapy at this time.  Will attempt to see pt on 3-15 as able

## 2017-03-14 NOTE — PROGRESS NOTES
"  Nephrology Progress Note  03/14/2017         Assessment & Recommendations:   Gem Jade is a 71 year old F with T2 DM , diabetic nephropathy, now s/p LDKT in 10/27/1999, with baseline Cr in 0.8-1, on sirolimus/tacrolimus/prednisone, admitted with AMS, found with ROB with a Cr of 3.3, and UA consistent with UTI. Uc postivive for enterococcus.     1. LDKT: with ROB,  prerenal and UTI.  U/S with resolved hydronephrosis. Kidney function continues to slowly improve with creatinine down to 1.8 with today's labs.  Good urine output.    2. Immunosuppression:  Continues on tacrolimus and sirolimus.  Restarted on Fluconazole.  Okay drug levels with them being ~ 12 1/2 hr trough.  3. HTN - okay to slightly elevated BP. On Labetalol 200 mg BID and Clonidine 0.1 mg BID.  Would follow for now.  4. CKD- MB: moderately elevated PTH and would follow for now, but may consider starting calcitriol in the future.  5. Anemia: stable Hgb.  6. UTI: with chronic Johnson, now with UCx positive for enterococcus and yeast. Treated with Amoxcillin and Fluconazole. Please change the Johnson after 3 days of treatment with Fluconazole.     Recommendations were communicated to primary team via this note.     Ross Elizabeth MD       Interval History :   In the last 24 hours Gem Jade's kidney function continues to slowly improve with creatinine ~ 1.8.  Good urine output.  Patient reports she continues to feel pretty good and back near baseline.  No fever, sweats or chills.  No chest pain or shortness of breath.  No nausea, vomiting or diarrhea.  No leg swelling.    Review of Systems:   4 point ROS was obtained and negative, except as noted in Interval History.    Physical Exam:   I/O last 3 completed shifts:  In: 120 [P.O.:120]  Out: 2150 [Urine:2150]   /51 (BP Location: Left arm)  Pulse 66  Temp 98.5  F (36.9  C) (Oral)  Resp 17  Ht 1.702 m (5' 7\")  Wt 91 kg (200 lb 9.6 oz)  SpO2 96%  BMI 31.42 kg/m2     GENERAL APPEARANCE: " lying in reclined chair  HENT: no mouth sores  PULM: lungs clear to auscultation bilaterally, equal air movement  CV: regular rhythm, normal rate     - no LE edema bilaterally   GI: soft, nontender, nondistended, bowel sounds are positive, morbidly obese   TX KIDNEY: normal    Labs:   All labs reviewed by me  Electrolytes/Renal -   Recent Labs   Lab Test  03/14/17   0701  03/13/17   0743  03/12/17   0853   03/09/17   0750   02/17/17   0644  02/16/17   2303   09/05/16   0617  09/04/16   0815   NA  138  135  138   < >  141   < >  135  131*   < >  135  136   POTASSIUM  3.8  3.8  4.1   < >  4.1   < >  4.4   --    < >  4.0  4.0   CHLORIDE  107  104  106   < >  107   < >  97   --    < >  103  104   CO2  21  22  22   < >  26   < >  25   --    < >  21  22   BUN  29  32*  32*   < >  39*   < >  40*   --    < >  16  12   CR  1.82*  2.00*  2.16*   < >  2.94*   < >  1.05*   --    < >  0.54  0.58   GLC  203*  244*  225*   < >  138*   < >  354*   --    < >  177*  178*   ROGER  8.0*  7.8*  7.6*   < >  8.9   < >  11.2*   --    < >  8.4*  8.7   MAG   --    --    --    --    --    --    --   2.0   --   2.2  1.4*   PHOS   --    --    --    --   3.2   --   3.2  2.9   --    --    --     < > = values in this interval not displayed.       CBC -   Recent Labs   Lab Test  03/14/17   0701  03/13/17   0743  03/12/17   0853   WBC  5.1  3.8*  4.7   HGB  8.7*  8.8*  8.6*   PLT  103*  99*  108*       LFTs -   Recent Labs   Lab Test  03/09/17   0750  03/08/17   1243  02/16/17 2007 09/05/16   0617   ALKPHOS   --   87  80  53   BILITOTAL   --   0.3  0.5  0.9   ALT   --   <6  14  10   AST   --   13  9  15   PROTTOTAL   --   7.9  8.2  7.3   ALBUMIN  2.7*  3.0*  2.8*  2.6*       Iron Panel -   Recent Labs   Lab Test  02/17/17   2358  09/04/16   0815  08/07/16   0750   IRON  50  124  12*   IRONSAT  29  97*  7*   GIANA  1069*  2676*  645*     Imaging:  All imaging studies reviewed by me.     Current Medications:    meclizine  12.5 mg Oral TID     insulin  glargine  12 Units Subcutaneous At Bedtime     amoxicillin  500 mg Oral Q12H HO     gabapentin  300 mg Oral At Bedtime     fluconazole  200 mg Oral Daily     tacrolimus  1 mg Oral BID     famotidine  20 mg Oral Daily     cloNIDine  0.1 mg Oral BID     labetalol  200 mg Oral BID     levothyroxine  50 mcg Oral Daily     predniSONE  2.5 mg Oral Daily     pravastatin  10 mg Oral Daily     insulin aspart  1-7 Units Subcutaneous TID AC     insulin aspart  1-5 Units Subcutaneous At Bedtime     sirolimus  1.5 mg Oral Daily        Ross Elizabeth MD

## 2017-03-14 NOTE — PROGRESS NOTES
Medicine Daily Progress Note   03/14/2017    Gem Jade MRN# [id number]   Age: 71 year old  Sex: female YOB: 1945  Date of Admissions: 3/8/2017          Assessment and Plan:   70 y/o female with PMH significant for kidney transplant (1999), complex UTIs, morbid obesity, mobility issues, fatigue, DM-II, chronic pain, hypothyroidism, HTN, and HLD who was admitted for acute kidney injury and altered mental status    # ROB in setting of UTI vs transplant rejection  Creatinine 3.49 on 3/6/2017, trending down to 1.82 today, still up from baseline of 0.8 - 1.0.   - Hgb and WBC close to baseline established in mid-2016. Will continue to trend CBC.  - Tacrolimus and Sirolimus levels low but Transplant Nephrology is satisfied with levels. Will continue to consult Transplant Nephrology.   - Recommending outpatient follow-up with Urology to assess chronic in-dwelling Johnson catheter.     # Altered mental status in setting of UTI vs metabolic encephalopathy  Patient altered mental status improving from admission per patient and her .   - Incentive spirometer in use.   - Treatment of UTI to rule out infectious etiology (see below)  - Continue trending BMP to rule out electrolyte disturbances  - Gabapentin lowered to 300mg QHS. Will consider discontinuing Clonidine in order to limit pharmaceutical manifestations of AMS in outpatient setting.   - Occupational therapy noted no cognitive impairment. Continued work with PT/OT to increase range of motion and mobility.   - Recommending outpatient Neuropsych evaluation.     # Sacral ulcer  Seen on previous admit in February 2017.   - Appreciate dressing and recs from Wound Care Nurse     # Urinary tract infection  Cloudy urine identified upon admission. Concerning for UTI due to previous presence of WBCs and leukocyte esterase.   - Chronic indwelling Johnson exchanged 3/9/17. Replaced again today in after three days of antibiotic / antifungal meds (3/13/17).   -  Blood cultures (3/8/17) - Negative  - Started on Amoxicillin per Enterococcus faecalis sensitivities. Planning total of 14 day course of antibiotics (until 3/22/17).  - Fluconazole 200mg QDay continued for yeast coverage. Planning total of 14 day course of antifungals (until 3/22/17).    # Hypertension  - Continue home clonidine and labetalol  - Hydralazine PRN    # HLD  - Continue home pravastatin     # Pain:   - Continue acetaminophen PRN  - Gabapentin lowered to 300mg QHS.    # Diarrhea:   - Continue lomotil PRN  - Continue imodium PRN    # Hypothyroid  - Continue synthroid    # Diabetes:   - Continue Lantus 10 units  - Medium SSI  - POC glucose checks before meals and bedtime    Summary  FEN: Renal Diet  Prophylaxis  - Isolation: Contact  - Lung: CPAP at night  - DVT: none  - GI: Pepcid  Code Status: FULL    Disposition: Pending workup for altered mental status / infection. Discussed her improvement during her admission with her  Keshawn. Planning on discharge to home sometime this week.    Patient was seen and discussed with Dr. Radha Boyce.    Alexi Richard DDS  Oral & Maxillofacial Surgery Resident  PGY-1  [phone]     Subjective (interval history):   Mrs. Jade had a difficulty sleeping due to continued right sided back pain. The pain goes away when she is lying flat. She continues to attribute the pain to her activity associated with PT/OT. She continues to report that her mental status is back to baseline. She denies having a hallucination for five days.      Physical Exam:   Vitals:  Temp:  [97.6  F (36.4  C)-98.9  F (37.2  C)] 97.6  F (36.4  C)  Heart Rate:  [64-75] 64  Resp:  [16-17] 16  BP: (110-173)/(46-81) 165/59  SpO2:  [96 %-100 %] 100 %    Temp  Av.6  F (36.4  C)  Min: 96.2  F (35.7  C)  Max: 98.4  F (36.9  C)      Pulse  Av.5  Min: 57  Max: 87 Resp  Av.5  Min: 8  Max: 30  SpO2  Av.3 %  Min: 87 %  Max: 100 %  FiO2 (%)  Av %  Min: 40 %  Max: 40 %       Wt Readings  from Last 4 Encounters:   03/14/17 89.9 kg (198 lb 1.6 oz)   02/20/17 93.1 kg (205 lb 4.8 oz)   09/03/16 91.4 kg (201 lb 8 oz)   08/09/16 90.7 kg (200 lb)       Intake/Output Summary (Last 24 hours) at 03/09/17 1311  Last data filed at 03/09/17 1119   Gross per 24 hour   Intake          2101.67 ml   Output             2330 ml   Net          -228.33 ml     General: Laying in reclined chair eating breakfast, in no acute distress. Awake and alert upon entry.   Skin: Sacral wound noted, confined to epidermis.  HEENT: Atraumatic, normocephalic. Anicteric sclera. No swelling or masses.   Respiratory: Non-labored breathing, full saturation on room air, CTAB  : Johnson in place, urine pale yellow in collection bag  Extremities:Upper extremities have equal strength and mobility.   Neurologic: A&O x 3 (oriented to self, place, and president, orientation to time improved from admission), speech normal         Laboratory Data:   ROUTINE ICU LABS (Last four results)  CMP    Recent Labs  Lab 03/14/17  0701 03/13/17  0743 03/12/17  0853 03/11/17  0859  03/09/17  0750 03/08/17  1243    135 138 136  < > 141 135   POTASSIUM 3.8 3.8 4.1 3.8  < > 4.1 4.6   CHLORIDE 107 104 106 104  < > 107 98   CO2 21 22 22 24  < > 26 28   ANIONGAP 10 9 10 8  < > 8 8   * 244* 225* 217*  < > 138* 61*   BUN 29 32* 32* 33*  < > 39* 42*   CR 1.82* 2.00* 2.16* 2.40*  < > 2.94* 3.37*   GFRESTIMATED 27* 25* 23* 20*  < > 16* 13*   GFRESTBLACK 33* 30* 27* 24*  < > 19* 16*   ROGER 8.0* 7.8* 7.6* 8.1*  < > 8.9 10.2*   PHOS  --   --   --   --   --  3.2  --    PROTTOTAL  --   --   --   --   --   --  7.9   ALBUMIN  --   --   --   --   --  2.7* 3.0*   BILITOTAL  --   --   --   --   --   --  0.3   ALKPHOS  --   --   --   --   --   --  87   AST  --   --   --   --   --   --  13   ALT  --   --   --   --   --   --  <6   < > = values in this interval not displayed.  CBC    Recent Labs  Lab 03/14/17  0701 03/13/17  0743 03/12/17  0853 03/11/17  0859   WBC 5.1  3.8* 4.7 4.2   RBC 3.09* 3.09* 3.08* 3.17*   HGB 8.7* 8.8* 8.6* 8.9*   HCT 28.3* 28.5* 28.1* 29.1*   MCV 92 92 91 92   MCH 28.2 28.5 27.9 28.1   MCHC 30.7* 30.9* 30.6* 30.6*   RDW 15.4* 15.4* 15.4* 15.1*   * 99* 108* 98*     INR    Recent Labs  Lab 03/08/17  1243   INR 1.01       Culture data:  Urine culture (3/8/2017) - Enterococcus faecalis isolated, with sensitivity to ampicillin, penicillin, nitrofurantoin, and vancomycin noted  Blood cultures (3/8/2017) - Negative

## 2017-03-15 ENCOUNTER — APPOINTMENT (OUTPATIENT)
Dept: PHYSICAL THERAPY | Facility: CLINIC | Age: 72
DRG: 698 | End: 2017-03-15
Payer: MEDICARE

## 2017-03-15 ENCOUNTER — CARE COORDINATION (OUTPATIENT)
Dept: CARDIOLOGY | Facility: CLINIC | Age: 72
End: 2017-03-15

## 2017-03-15 VITALS
RESPIRATION RATE: 18 BRPM | BODY MASS INDEX: 31.55 KG/M2 | SYSTOLIC BLOOD PRESSURE: 134 MMHG | TEMPERATURE: 96.5 F | DIASTOLIC BLOOD PRESSURE: 59 MMHG | HEART RATE: 66 BPM | WEIGHT: 201 LBS | OXYGEN SATURATION: 99 % | HEIGHT: 67 IN

## 2017-03-15 LAB
1,25(OH)2D SERPL-MCNC: 5.6 PG/ML (ref 19.9–79.3)
ANION GAP SERPL CALCULATED.3IONS-SCNC: 8 MMOL/L (ref 3–14)
BUN SERPL-MCNC: 34 MG/DL (ref 7–30)
CALCIUM SERPL-MCNC: 8.4 MG/DL (ref 8.5–10.1)
CHLORIDE SERPL-SCNC: 107 MMOL/L (ref 94–109)
CO2 SERPL-SCNC: 22 MMOL/L (ref 20–32)
CREAT SERPL-MCNC: 1.7 MG/DL (ref 0.52–1.04)
ERYTHROCYTE [DISTWIDTH] IN BLOOD BY AUTOMATED COUNT: 15.4 % (ref 10–15)
GFR SERPL CREATININE-BSD FRML MDRD: 30 ML/MIN/1.7M2
GLUCOSE BLDC GLUCOMTR-MCNC: 207 MG/DL (ref 70–99)
GLUCOSE BLDC GLUCOMTR-MCNC: 310 MG/DL (ref 70–99)
GLUCOSE SERPL-MCNC: 236 MG/DL (ref 70–99)
HCT VFR BLD AUTO: 27.3 % (ref 35–47)
HGB BLD-MCNC: 8.4 G/DL (ref 11.7–15.7)
MCH RBC QN AUTO: 27.9 PG (ref 26.5–33)
MCHC RBC AUTO-ENTMCNC: 30.8 G/DL (ref 31.5–36.5)
MCV RBC AUTO: 91 FL (ref 78–100)
PLATELET # BLD AUTO: 115 10E9/L (ref 150–450)
POTASSIUM SERPL-SCNC: 3.9 MMOL/L (ref 3.4–5.3)
RBC # BLD AUTO: 3.01 10E12/L (ref 3.8–5.2)
SIROLIMUS BLD-MCNC: 6.1 UG/L (ref 5–15)
SODIUM SERPL-SCNC: 136 MMOL/L (ref 133–144)
TACROLIMUS BLD-MCNC: 4.4 UG/L (ref 5–15)
TME LAST DOSE: ABNORMAL H
TME LAST DOSE: NORMAL H
WBC # BLD AUTO: 4.6 10E9/L (ref 4–11)

## 2017-03-15 PROCEDURE — 40000193 ZZH STATISTIC PT WARD VISIT

## 2017-03-15 PROCEDURE — 99238 HOSP IP/OBS DSCHRG MGMT 30/<: CPT | Mod: GC | Performed by: INTERNAL MEDICINE

## 2017-03-15 PROCEDURE — 25000132 ZZH RX MED GY IP 250 OP 250 PS 637: Mod: GY | Performed by: INTERNAL MEDICINE

## 2017-03-15 PROCEDURE — 97110 THERAPEUTIC EXERCISES: CPT | Mod: GP

## 2017-03-15 PROCEDURE — 80197 ASSAY OF TACROLIMUS: CPT | Performed by: STUDENT IN AN ORGANIZED HEALTH CARE EDUCATION/TRAINING PROGRAM

## 2017-03-15 PROCEDURE — 85027 COMPLETE CBC AUTOMATED: CPT | Performed by: STUDENT IN AN ORGANIZED HEALTH CARE EDUCATION/TRAINING PROGRAM

## 2017-03-15 PROCEDURE — 80195 ASSAY OF SIROLIMUS: CPT | Performed by: STUDENT IN AN ORGANIZED HEALTH CARE EDUCATION/TRAINING PROGRAM

## 2017-03-15 PROCEDURE — 25900017 H RX MED GY IP 259 OP 259 PS 637: Mod: GY | Performed by: INTERNAL MEDICINE

## 2017-03-15 PROCEDURE — 00000146 ZZHCL STATISTIC GLUCOSE BY METER IP

## 2017-03-15 PROCEDURE — 80048 BASIC METABOLIC PNL TOTAL CA: CPT | Performed by: STUDENT IN AN ORGANIZED HEALTH CARE EDUCATION/TRAINING PROGRAM

## 2017-03-15 PROCEDURE — 40000275 ZZH STATISTIC RCP TIME EA 10 MIN

## 2017-03-15 PROCEDURE — 25000132 ZZH RX MED GY IP 250 OP 250 PS 637: Mod: GY | Performed by: STUDENT IN AN ORGANIZED HEALTH CARE EDUCATION/TRAINING PROGRAM

## 2017-03-15 PROCEDURE — 25000131 ZZH RX MED GY IP 250 OP 636 PS 637: Mod: GY | Performed by: STUDENT IN AN ORGANIZED HEALTH CARE EDUCATION/TRAINING PROGRAM

## 2017-03-15 PROCEDURE — 94660 CPAP INITIATION&MGMT: CPT

## 2017-03-15 PROCEDURE — 97530 THERAPEUTIC ACTIVITIES: CPT | Mod: GP

## 2017-03-15 PROCEDURE — A9270 NON-COVERED ITEM OR SERVICE: HCPCS | Mod: GY | Performed by: INTERNAL MEDICINE

## 2017-03-15 PROCEDURE — A9270 NON-COVERED ITEM OR SERVICE: HCPCS | Mod: GY | Performed by: STUDENT IN AN ORGANIZED HEALTH CARE EDUCATION/TRAINING PROGRAM

## 2017-03-15 PROCEDURE — 36415 COLL VENOUS BLD VENIPUNCTURE: CPT | Performed by: STUDENT IN AN ORGANIZED HEALTH CARE EDUCATION/TRAINING PROGRAM

## 2017-03-15 PROCEDURE — 25000131 ZZH RX MED GY IP 250 OP 636 PS 637: Mod: GY | Performed by: HOSPITALIST

## 2017-03-15 RX ORDER — MECLIZINE HCL 12.5 MG 12.5 MG/1
12.5 TABLET ORAL 3 TIMES DAILY
Qty: 90 TABLET | Refills: 1 | Status: SHIPPED
Start: 2017-03-15 | End: 2017-05-09

## 2017-03-15 RX ORDER — FLUCONAZOLE 200 MG/1
200 TABLET ORAL DAILY
Qty: 7 TABLET | Refills: 0 | Status: SHIPPED
Start: 2017-03-15 | End: 2017-03-22

## 2017-03-15 RX ORDER — TACROLIMUS 1 MG/1
1 CAPSULE ORAL 2 TIMES DAILY
Qty: 120 CAPSULE | Refills: 6 | Status: ON HOLD
Start: 2017-03-15 | End: 2017-04-28

## 2017-03-15 RX ORDER — GABAPENTIN 300 MG/1
300 CAPSULE ORAL AT BEDTIME
Refills: 0
Start: 2017-03-15 | End: 2017-03-17 | Stop reason: DRUGHIGH

## 2017-03-15 RX ORDER — AMOXICILLIN 500 MG/1
500 CAPSULE ORAL EVERY 12 HOURS
Qty: 14 CAPSULE | Refills: 0 | Status: SHIPPED
Start: 2017-03-15 | End: 2017-03-22

## 2017-03-15 RX ADMIN — MECLIZINE 12.5 MG: 12.5 TABLET ORAL at 09:32

## 2017-03-15 RX ADMIN — LABETALOL HCL 200 MG: 200 TABLET, FILM COATED ORAL at 09:32

## 2017-03-15 RX ADMIN — LEVOTHYROXINE SODIUM 50 MCG: 50 TABLET ORAL at 09:31

## 2017-03-15 RX ADMIN — PREDNISONE 2.5 MG: 2.5 TABLET ORAL at 09:32

## 2017-03-15 RX ADMIN — FLUCONAZOLE 200 MG: 200 TABLET ORAL at 09:31

## 2017-03-15 RX ADMIN — SIROLIMUS 1.5 MG: 1 TABLET, SUGAR COATED ORAL at 09:31

## 2017-03-15 RX ADMIN — PRAVASTATIN SODIUM 10 MG: 10 TABLET ORAL at 09:32

## 2017-03-15 RX ADMIN — TACROLIMUS 1 MG: 1 CAPSULE ORAL at 09:31

## 2017-03-15 RX ADMIN — ACETAMINOPHEN 650 MG: 325 TABLET, FILM COATED ORAL at 01:47

## 2017-03-15 RX ADMIN — AMOXICILLIN 500 MG: 250 CAPSULE ORAL at 09:31

## 2017-03-15 RX ADMIN — FAMOTIDINE 20 MG: 20 TABLET ORAL at 09:31

## 2017-03-15 RX ADMIN — INSULIN ASPART 2 UNITS: 100 INJECTION, SOLUTION INTRAVENOUS; SUBCUTANEOUS at 09:30

## 2017-03-15 RX ADMIN — CLONIDINE HYDROCHLORIDE 0.1 MG: 0.1 TABLET ORAL at 09:31

## 2017-03-15 NOTE — PLAN OF CARE
Problem: Goal Outcome Summary  Goal: Goal Outcome Summary  PT/5A: Discussed DC to home, pt w/o concern. Engaged in log roll for mobility, required min A. Provided w/ gait belt to assist w/ crossing LE for rolling to increase independence w/ bed mobility. Pt engaged in supine and sidelying exercise.      Recommend DC to TCU to improve mobility, pt declining at this time and would like to return home. Pt would benefit from HHPT to progress bed mobilty and core strength however pt declining. Therefore recommend continuation of HEP and assist from family PRN

## 2017-03-15 NOTE — PLAN OF CARE
Problem: Goal Outcome Summary  Goal: Goal Outcome Summary  Occupational Therapy Discharge Summary     Reason for therapy discharge:    Discharged to home with home therapy.     Progress towards therapy goal(s). See goals on Care Plan in [facility] electronic health record for goal details.  Goals partially met.  Barriers to achieving goals:   discharge from facility.     Therapy recommendation(s):    Continued therapy is recommended.  Rationale/Recommendations:  Address decreased ADL I.

## 2017-03-15 NOTE — PROGRESS NOTES
"[facility]  \"Hello, my name is Klhoe Wright , and I am calling from the [facility].  I want to check in and see how you are doing, after leaving the hospital.  You may also receive a call from your Care Coordinator (care team), but I want to make sure you don t have any urgent needs.  I have a couple questions to review with you:     Post-Discharge Outreach                                                    Gem Jade is a 71 year old female     Follow-up Appointments           Adult [facility] Follow-up and recommended labs and tests       - Follow-up with Urology service for continued evaluation of your bladder and in-dwelling catheter. Call to schedule one week after discharge ([phone])  - Follow-up with your nurse practitioner, Marivel Barcenas, on 4/5/17 as scheduled for continued evaluation.              Follow Up and recommended labs and tests       - BMP, CBC with differential, and liver function panel after completion of Amoxicillin / Fluconazole regimen (3/22/17)                       Your next 10 appointments already scheduled            Mar 21, 2017 2:00 PM CDT   Office Visit with Cnochita Toledo [facility] ([facility])     [address] [place] [id number] [phone]                 Bring a current list of meds and any records pertaining to this visit. For Physicals, please bring immunization records and any forms needing to be filled out. Please arrive 10 minutes early to complete paperwork.                  Apr 05, 2017 2:00 PM CDT   Return Visit with ANTHONY Antonio CNP   [facility] ([facility])     [address] [place] [address] [phone]                  May 03, 2017 2:00 PM CDT   Return Visit with ANTHONY Antonio CNP   [facility] " ([facility])     [address]  [address]  [facility] [id number] [phone]                  Jun 07, 2017 2:00 PM CDT   [facility] Visit with AN[facility]ONY Antonio CNP   [facility]               Care Team:    Patient Care Team       Relationship Specialty Notifications Start End    Marivel Barcenas APRN CNP PCP - General Family Practice  5/24/13     Phone: [phone] Fax: [phone]          [facility] [address] [facility] [address]    Aditi Flores RN Registered Nurse  Admissions 3/18/14     Monico Heck MD MD Transplant  10/6/15     Comment:  nephrology - [phone]    Phone: [phone] Fax: [phone]          [facility] [address]  [facility] [address]    [facility] Care Coordinator  Admissions 2/17/16     Phone: [phone] Fax: [phone]                Transition of Care Review                                                      Did you have a surgery or procedure during your hospital visit? Yes   If yes, do you have any of the following:     Signs of infection:  NO    Pain:  No     Pain Scale (0-10) 0/10     Location: NA    Wound/incision concerns? NA    Do you have all of your medications/refills?  Yes- going to  right now from [facility]    Are you having any side effects or questions about your medication(s)? No    Do you have any new or worsening symptoms?  No    Do you have any future appointments scheduled?   Yes       Next 5 appointments (look out 90 days)     Mar 21, 2017  2:00 PM CDT   Office Visit with Conchita Toledo [facility] ([facility])    [address] [place] [address]  [address]  [facility] [id number] [phone]            Apr 05, 2017  2:00 PM CDT   Return Visit with ANTHONY Antonio CNP   [facility] ([facility])    [address]  [address]  [facility] [address]   [phone]            May 03, 2017  2:00 PM CDT   Return Visit with ANTHONY Antonio CNP   [facility] ([facility])    [address]  [address]  [facility] [address]   [phone]            Jun 07, 2017  2:00 PM CDT   Return Visit with ANTHONY Antonio CNP   [facility] ([facility])    [address]  [address]  [facility] [address]   [phone]                      Plan                                                      Thanks for your time.  Your Care Coordinator may follow-up within the next couple days.  In the meantime if you have questions, concerns or problems call your care team.        Khloe Wright

## 2017-03-15 NOTE — PLAN OF CARE
Problem: Goal Outcome Summary  Goal: Goal Outcome Summary  Outcome: No Change  Pt AOx4, VSS on RA. Total cares/mechanical lift. Poor vision due to diabetic retinopathy. No c/o pain/nausea. Johnson patent and in place. Discharging to home via [facility] stretcher at 1300. PIV removed. AVS reviewed and signed.

## 2017-03-15 NOTE — PLAN OF CARE
Problem: Goal Outcome Summary  Goal: Goal Outcome Summary  Outcome: No Change  Elevated BP at HS; PRN hydralazine 10 mg IV given with effectiveness. CPAP used overnight. A&O. Forgetful at times. Patient refusing T&R Q2hrs. Renal diet. No complaints of nausea, no emesis. Blood sugars of 374 and 310. Saline locked to PIV. Johnson in place for neurogenic bladder. No stools. Coccyx dressing intact. Complaints of generalized aching; PRN Tylenol 650 mg PO given. PO antibiotics continued and creatinine trended. Plan to discharge home with  this week.

## 2017-03-15 NOTE — PROGRESS NOTES
Important Message Medicare Letter reviewed w/ pt.  Letter signed and dated by pt.  Original given to pt, yellow copy placed in paper chart.  _____________________________________________  Myrna Biswas RN CC

## 2017-03-15 NOTE — PROGRESS NOTES
"  Nephrology Progress Note  03/15/2017         Assessment & Recommendations:   Gem Jade is a 71 year old F with T2 DM , diabetic nephropathy, now s/p LDKT in 10/27/1999, with baseline Cr in 0.8-1, on sirolimus/tacrolimus/prednisone, admitted with AMS, found with ROB with a Cr of 3.3, and UA consistent with UTI. Uc postivive for enterococcus.     1. LDKT: with ROB,  prerenal and UTI.  U/S with resolved hydronephrosis. Kidney function slowly improving with creatinine 1.7 today.  Good urine output.    2. Immunosuppression:  Continues on tacrolimus and sirolimus.  Restarted on Fluconazole.  Okay drug levels with them being ~ 12 1/2 hr trough.  3. HTN - okay to slightly elevated BP. On Labetalol 200 mg BID and Clonidine 0.1 mg BID.  Would follow for now.  4. CKD- MB: moderately elevated PTH and would follow for now, but may consider starting calcitriol as outpatient.  5. Anemia: stable Hgb.  6. UTI: with chronic Johnson, now with UCx positive for enterococcus and yeast. Treated with Amoxcillin and Fluconazole.    Recommendations were communicated to primary team via this note.     Ross Elizabeth MD       Interval History :   In the last 24 hours Gem Jade's kidney function continues to improve with creatinine ~ 1.7 today.  Good urine output.  Patient is anxious to go home.  She reports feeling good and back to normal.  No fever, sweats or chills.  No nausea, vomiting or diarrhea.  No chest pain or shortness of breath.  No leg swelling.    Review of Systems:   4 point ROS was obtained and negative, except as noted in Interval History.    Physical Exam:   I/O last 3 completed shifts:  In: -   Out: 1950 [Urine:1950]   /59 (BP Location: Left arm)  Pulse 66  Temp 96.5  F (35.8  C) (Oral)  Resp 18  Ht 1.702 m (5' 7\")  Wt 91.2 kg (201 lb)  SpO2 99%  BMI 31.48 kg/m2     GENERAL APPEARANCE: lying in bed, alert and NAD  HENT: no mouth sores  PULM: lungs clear to auscultation bilaterally, equal air " movement  CV: regular rhythm, normal rate     - no LE edema bilaterally   GI: soft, nontender, nondistended, bowel sounds are positive, morbidly obese   TX KIDNEY: normal    Labs:   All labs reviewed by me  Electrolytes/Renal -   Recent Labs   Lab Test  03/15/17   0731  03/14/17   0701  03/13/17   0743   03/09/17   0750   02/17/17   0644  02/16/17   2303   09/05/16 0617 09/04/16   0815   NA  136  138  135   < >  141   < >  135  131*   < >  135  136   POTASSIUM  3.9  3.8  3.8   < >  4.1   < >  4.4   --    < >  4.0  4.0   CHLORIDE  107  107  104   < >  107   < >  97   --    < >  103  104   CO2  22  21  22   < >  26   < >  25   --    < >  21  22   BUN  34*  29  32*   < >  39*   < >  40*   --    < >  16  12   CR  1.70*  1.82*  2.00*   < >  2.94*   < >  1.05*   --    < >  0.54  0.58   GLC  236*  203*  244*   < >  138*   < >  354*   --    < >  177*  178*   ROGER  8.4*  8.0*  7.8*   < >  8.9   < >  11.2*   --    < >  8.4*  8.7   MAG   --    --    --    --    --    --    --   2.0   --   2.2  1.4*   PHOS   --    --    --    --   3.2   --   3.2  2.9   --    --    --     < > = values in this interval not displayed.       CBC -   Recent Labs   Lab Test  03/15/17   0731  03/14/17   0701  03/13/17   0743   WBC  4.6  5.1  3.8*   HGB  8.4*  8.7*  8.8*   PLT  115*  103*  99*       LFTs -   Recent Labs   Lab Test  03/09/17   0750  03/08/17   1243  02/16/17 2007 09/05/16   0617   ALKPHOS   --   87  80  53   BILITOTAL   --   0.3  0.5  0.9   ALT   --   <6  14  10   AST   --   13  9  15   PROTTOTAL   --   7.9  8.2  7.3   ALBUMIN  2.7*  3.0*  2.8*  2.6*       Iron Panel -   Recent Labs   Lab Test  02/17/17   2358  09/04/16   0815  08/07/16   0750   IRON  50  124  12*   IRONSAT  29  97*  7*   GIANA  1069*  2676*  645*       Current Medications:    meclizine  12.5 mg Oral TID     insulin glargine  12 Units Subcutaneous At Bedtime     amoxicillin  500 mg Oral Q12H HO     gabapentin  300 mg Oral At Bedtime     fluconazole  200 mg Oral  Daily     tacrolimus  1 mg Oral BID     famotidine  20 mg Oral Daily     cloNIDine  0.1 mg Oral BID     labetalol  200 mg Oral BID     levothyroxine  50 mcg Oral Daily     predniSONE  2.5 mg Oral Daily     pravastatin  10 mg Oral Daily     insulin aspart  1-7 Units Subcutaneous TID AC     insulin aspart  1-5 Units Subcutaneous At Bedtime     sirolimus  1.5 mg Oral Daily        Ross Elizabeth MD

## 2017-03-16 ENCOUNTER — TELEPHONE (OUTPATIENT)
Dept: FAMILY MEDICINE | Facility: CLINIC | Age: 72
End: 2017-03-16

## 2017-03-16 NOTE — TELEPHONE ENCOUNTER
Please put patient on my schedule for 12:15 on Friday 3/17, please call and let the patient know    Klaudia MTZ

## 2017-03-16 NOTE — TELEPHONE ENCOUNTER
Received message from April, SEKOU [facility] with request for CORRIE orders.  Orders should include:    SN 1w5, 5 PRN (patient only agreed to weekly visits)  HHA 1w1, 2w4    April states patient missed her 3/8 B12 injection.  She is questioning if the B12 can be given the first visit in April to get her back on her schedule, per patient request?    April also noted patient's gabapentin dose decreased dramatically from 900 mg BID to 300 mg at HS.  Patient now c/o her legs aching.    Sent message to April giving verbal ok on orders for SN and HHA.    Routing to provider to review B12 and Gabapentin.    Wendie Harris RN

## 2017-03-16 NOTE — DISCHARGE SUMMARY
[facility] Discharge Summary    Gem Jade MRN# [id number]   Age: 71 year old YOB: 1945     Date of Admission:  3/8/2017  Date of Discharge::  3/15/2017  1:18 PM  Admitting Physician:  Wang Mahajan MD  Discharge Physician:  [unfilled]     Home clinic: [facility] with ANTHONY Edgar    RECOMMENDATIONS UPON DISCHARGE  - Follow-up with Urology service for continued evaluation of neurogenic bladder and in-dwelling catheter  - Neuropsych referral placed for evaluation of AMS in outpatient setting  - Consider alternatives / stop clonidine, gabapentin, and oxcarbazepine due to potential influence on patient's history of AMS  - Changing of Johnson catheter every 3 weeks to help prevent recurrent UTIs  - Follow-up with PCP Marivel Barcenas, on 4/5/17, as scheduled   - BMP, LFT panel, and CBC w/ differential upon completion of amoxicillin / fluconazole regimen (3/22/17)  - Recommending exchanging Johnson catheter once every three weeks.          Admission Diagnoses:   Acute kidney injury (H) [N17.9]         Discharge Diagnoses:   Acute kidney injury  Altered mental status  Urinary tract infection  Sacral ulcer          Procedures:     Results for orders placed or performed during the hospital encounter of 03/08/17   US Renal Transplant    Narrative    EXAMINATION: US RENAL TRANSPLANT, 3/8/2017 3:12 PM     COMPARISON: 2/16/2017    HISTORY: Acute kidney injury    TECHNIQUE:  Grey-scale, color Doppler and spectral flow analysis.    FINDINGS:  The transplant kidney is located right lower quadrant, and measures  13.1 cm length. Parenchyma is of normal thickness and echogenicity. No  focal lesions. No hydronephrosis. No perinephric fluid collection.      Renal Vein Flow:  12 cm/sec at hilum.   40 cm/sec at anastomosis.    Upper Pole Arcuate artery:  0.83 resistive index.    Mid pole Arcuate artery:  0.86 resistive index.     Lower Pole Arcuate artery:  0.84 resistive index.      Renal artery flow:   89 cm/sec peak systolic at hilum.  104 cm/sec peak systolic at anastomosis.    Iliac artery flow:  152 cm/sec peak systolic above anastomosis.  90 cm/sec peak systolic below anastomosis.    Iliac vein flow:  Patent above and below        Impression    IMPRESSION:    1. Mildly increased resistive indices in arcuate arteries, slightly  decreased from previous study. Patent Doppler examination of the  transplant vasculature. The increased RI's are of concern for  rejection.  2. Resolution of hydronephrosis.      I have personally reviewed the examination and initial interpretation  and I agree with the findings.    BRIAN RICHARD MD     *Note: Due to a large number of results and/or encounters for the requested time period, some results have not been displayed. A complete set of results can be found in Results Review.               Labs:   CMP  Recent Labs  Lab 03/15/17  0731 03/14/17  0701 03/13/17  0743 03/12/17  0853  03/09/17  0750    138 135 138  < > 141   POTASSIUM 3.9 3.8 3.8 4.1  < > 4.1   CHLORIDE 107 107 104 106  < > 107   CO2 22 21 22 22  < > 26   ANIONGAP 8 10 9 10  < > 8   * 203* 244* 225*  < > 138*   BUN 34* 29 32* 32*  < > 39*   CR 1.70* 1.82* 2.00* 2.16*  < > 2.94*   GFRESTIMATED 30* 27* 25* 23*  < > 16*   GFRESTBLACK 36* 33* 30* 27*  < > 19*   ROGER 8.4* 8.0* 7.8* 7.6*  < > 8.9   PHOS  --   --   --   --   --  3.2   ALBUMIN  --   --   --   --   --  2.7*   < > = values in this interval not displayed.  CBC  Recent Labs  Lab 03/15/17  0731 03/14/17  0701 03/13/17  0743 03/12/17  0853   WBC 4.6 5.1 3.8* 4.7   RBC 3.01* 3.09* 3.09* 3.08*   HGB 8.4* 8.7* 8.8* 8.6*   HCT 27.3* 28.3* 28.5* 28.1*   MCV 91 92 92 91   MCH 27.9 28.2 28.5 27.9   MCHC 30.8* 30.7* 30.9* 30.6*   RDW 15.4* 15.4* 15.4* 15.4*   * 103* 99* 108*     INRNo lab results found in last 7 days.          Discharge Medications:     Discharge Medication List as of 3/15/2017  9:48 AM      START taking these  medications    Details   amoxicillin (AMOXIL) 500 MG capsule Take 1 capsule (500 mg) by mouth every 12 hours for 7 days, Disp-14 capsule, R-0, Fax      fluconazole (DIFLUCAN) 200 MG tablet Take 1 tablet (200 mg) by mouth daily for 7 days, Disp-7 tablet, R-0, Fax      meclizine (ANTIVERT) 12.5 MG tablet Take 1 tablet (12.5 mg) by mouth 3 times daily, Disp-90 tablet, R-1, Fax         CONTINUE these medications which have CHANGED    Details   gabapentin (NEURONTIN) 300 MG capsule Take 1 capsule (300 mg) by mouth At Bedtime, R-0, No Print Out      tacrolimus (PROGRAF - GENERIC EQUIVALENT) 1 MG capsule Take 1 capsule (1 mg) by mouth 2 times daily, Disp-120 capsule, R-6, No Print Out         CONTINUE these medications which have NOT CHANGED    Details   sirolimus (RAPAMUNE - GENERIC EQUIVALENT) 0.5 MG tablet Take 1.5 mg by mouth daily, Historical      diphenoxylate-atropine (LOMOTIL) 2.5-0.025 MG per tablet Take 1 tablet by mouth 4 times daily as needed for diarrhea, Disp-50 tablet, R-3, Local Print      TraMADol HCl 50 MG TBDP Take 50 mg by mouth 3 times daily as needed, Disp-90 tablet, R-3, Local Print      insulin aspart (NOVOLOG FLEXPEN) 100 UNIT/ML injection Inject 7 units before meals BID. At bedtime use following sliding scale:   150-200 5u  201-250 7u  251-300 9u  301-350 11u  351+ 13u, Disp-15 mL, R-0, Historical      OXcarbazepine (TRILEPTAL) 300 MG tablet Take 1 tablet (300 mg) by mouth 2 times daily, Disp-180 tablet, R-3, E-Prescribe      levothyroxine (SYNTHROID) 50 MCG tablet Take 1 tablet (50 mcg) by mouth daily, Disp-90 tablet, R-3, E-Prescribe      cloNIDine (CATAPRES) 0.1 MG tablet Take 1 tablet (0.1 mg) by mouth 2 times daily, Disp-180 tablet, R-3, E-Prescribe      insulin glargine (LANTUS SOLOSTAR) 100 UNIT/ML PEN Inject 14 units under the skin daily., Disp-15 mL, R-3, E-PrescribeDose change, hold Rx on file      loratadine (CLARITIN) 10 MG tablet Take 1 tablet (10 mg) by mouth daily, Disp-90 tablet,  "R-3, E-Prescribe      pravastatin (PRAVACHOL) 10 MG tablet Take 1 tablet (10 mg) by mouth daily, Disp-90 tablet, R-3, E-Prescribe      Lactobacillus Rhamnosus, GG, (CULTURELL) capsule Take 1 capsule by mouth 2 times daily, Disp-60 capsule, R-11, Local Print      predniSONE (DELTASONE) 2.5 MG tablet Take 1 tablet (2.5 mg) by mouth daily, Disp-30 tablet, R-11, E-Prescribe      Cranberry 400 MG TABS Take 400 mg by mouth 2 times daily, Historical      phenazopyridine (PYRIDIUM) 100 MG tablet Take 1 tablet (100 mg) by mouth 3 times daily as needed for irritation, Disp-6 tablet, R-3, No Print OutAlready sent to pharmacy      famotidine (PEPCID) 20 MG tablet Take 1 tablet (20 mg) by mouth 2 times daily, Disp-60 tablet, R-1, Historical      ASPIRIN PO Take 81 mg by mouth daily.  , Historical      ACETAMINOPHEN PO Take 325-650 mg by mouth every 4 hours as needed., Historical      blood glucose monitoring (ACCU-CHEK COMPACT PLUS) test strip Use to test blood sugar 3 times daily or as directed., Disp-100 strip, R-11, E-Prescribe      blood glucose monitoring (ACCU-CHEK COMPACT CARE KIT) meter device kit Use to test blood sugar 3 times daily or as directed.Disp-1 kit, [id number]      labetalol (NORMODYNE) 200 MG tablet Take 1 tablet (200 mg) by mouth 2 times daily, Disp-180 tablet, R-3, E-PrescribeReplaces previous Rx sent for 100mg tabs      cyanocobalamin (VITAMIN B12) 1000 MCG/ML injection Inject 1 mL (1,000 mcg) into the muscle every 30 days, Disp-3 mL, R-3, E-Prescribe      syringe/needle, disp, (B-D SYRINGE/NEEDLE) 22G X 1-1/2\" 3 ML MISC Inject 1 Syringe into the muscle every 30 days, Disp-50 each, R-0, E-Prescribe      !! order for DME Equipment being ordered: Metaforic FX CPAP interface (nasal pillows), size XS.Disp-1 each, R-1, Local Print      glucagon (GLUCAGON EMERGENCY) 1 MG injection Inject 1 mg into the muscle once, Disp-1 each, R-11, Historical      clotrimazole (LOTRIMIN) 1 % cream Apply topically 2 times daily " Apply to buttocks twice a day until rash resolved.Disp-40 g, R-0Historical      insulin pen needle 30G X 8 MM Use as directed with LantusDisp-100 each, [id number]      ondansetron (ZOFRAN ODT) 4 MG disintegrating tablet Take 1-2 tablets (4-8 mg) by mouth every 8 hours as needed for nausea, Disp-20 tablet, R-5, E-Prescribe      simethicone (MYLICON) 125 MG CHEW Take 1 tablet (125 mg) by mouth as needed for intestinal gas, Disp-120 tablet, Historical      Nystatin (NYSTOP) 861639 UNIT/GM POWD Externally apply 1 g topically 3 times daily as needed, Disp-60 g, R-6, E-Prescribe      !! order for DME Equipment being ordered: Blue Chux  Please send to [email or web address] medicalDisp-100 pad, R-3, Local Print      alcohol swab prep pads Use to swab area of injection/yvon and vials as directed.Disp-1 Box, [id number]      !! ORDER FOR DME Equipment being ordered: Johnson Catheters - 18 frenchDisp-3 catheter, R-PRN, Fax      ACE NOT PRESCRIBED, INTENTIONAL, 1 each continuous prn ACE Inhibitor not prescribed due to Worsening renal function on ACE/ARB therapy, R-0, No Print Out       !! - Potential duplicate medications found. Please discuss with provider.      STOP taking these medications       RAPAMUNE 0.5 MG PO TABLET Comments:   Reason for Stopping:         loperamide (IMODIUM) 2 MG capsule Comments:   Reason for Stopping:                     Consultations:   Consultation during this admission received from urology and transplant nephrology.           Brief History of Illness:   Gem Jade is a 70 y/o female with PMH significant for kidney transplant (1999), complex UTIs, morbid obesity, mobility issues, fatigue, DM-II, chronic pain, hypothyroidism, HTN, and HLD who was admitted for acute kidney injury and altered mental status in the setting of urinary tract infection. She was previously admited from 2/16/17 - 2/20/17 due to altered mental status with UTI and hydronephrosis of transplant kidney.        Hospital Course:   #  ROB  Creatinine 3.49 on 3/6/2017, trended down to 1.70 on discharge, continuing towards baseline of 0.8 - 1.0.   - Transplant Nephrology consulted, no immediate concern about transplant rejection. Tacrolimus and Sirolimus levels were trended and found adequate during admission. Transplant Nephrology agreed to patient discharge.      # Altered mental status in setting of UTI vs metabolic encephalopathy  Patient altered mental status improved from admission per patient and her .   - UTI treated to eliminate as possible etiology (see below)   - Occupational therapy noted no cognitive impairment. Continued work with PT/OT to increase range of motion and mobility. Meclizine started for vertigo.  - Recommending outpatient Neuropsych evaluation.    - Recommending considering discontinuing clonidine, gabapentin, and oxcarbazepine in order to limit pharmaceutical manifestations of AMS in outpatient setting.      # Sacral ulcer  Seen on previous admit in February 2017.   - Appreciate dressing and recs from Wound Care Nurse      # Urinary tract infection from E.faecalis and Candida  Cloudy urine identified upon admission. Concerning for UTI due to previous presence of WBCs and leukocyte esterase. Patient started on IV ceftriaxone upon admission.  - Chronic indwelling Johnson exchanged 3/9/17. Replaced again after three days of antibiotic / antifungal meds (3/13/17).   - Blood cultures (3/8/17) - Negative  - Continued on Ceftriaxone from admission until 3/11/17 and transitioned to Amoxicillin on 3/11/17 per Enterococcus faecalis sensitivities. Planning total of 14 day course of antibiotics (until 3/22/17).  - Fluconazole 200mg QDay continued for yeast coverage. Planning total of 14 day course of antifungals (until 3/22/17).  - Urology consulted, who recommended outpatient follow-up with urodynamic study to assess need for chronic indwelling catheter     # Hypertension  - Continued home clonidine and labetalol     # HLD  -  "Continued home pravastatin     # Hypothyroid  - Continued home levothyroxine     # Diabetes:   - Continued home insulin with sliding scale  - POC glucose checks before were in place before meals and bedtime       Condition at Discharge:   /59 (BP Location: Left arm)  Pulse 66  Temp 96.5  F (35.8  C) (Oral)  Resp 18  Ht 1.702 m (5' 7\")  Wt 91.2 kg (201 lb)  SpO2 99%  BMI 31.48 kg/m2    General: Laying in bed, eating breakfast, in no acute distress. Awake and alert upon entry.   Skin: Sacral wound noted, confined to epidermis.  HEENT: Atraumatic, normocephalic. Anicteric sclera. No swelling or masses.   Respiratory: Non-labored breathing, full saturation on room air, CTAB  : Johnson in place, urine pale yellow in collection bag  Extremities:Upper extremities have equal strength and mobility.   Neurologic: A&O x 3 (oriented to self, place, and president, orientation to time improved from admission), speech normal         Discharge Instructions and Follow-Up:   Discharge diet: Renal  Discharge activity: Activity as tolerated      Discharge Procedure Orders  Home care nursing referral   Referral Type: Home Health Therapies & Aides     NEUROPSYCHOLOGY REFERRAL   Referral Type: Mental Health Outpatient     Urology Adult Referral     Reason for your hospital stay   Order Comments: You were in the hospital for a urinary tract infection and acute kidney injury.     Activity   Order Comments: Your activity upon discharge: activity as tolerated   Order Specific Question Answer Comments   Is discharge order? Yes      Adult [facility] Follow-up and recommended labs and tests   Order Comments: - Follow-up with Urology service for continued evaluation of your bladder and in-dwelling catheter. Call to schedule one week after discharge ([phone])  - Follow-up with your nurse practitioner, Marivel Barcenas, on 4/5/17 as scheduled for continued evaluation.     Supplies   Order Comments: - Post-insertion Johnson care wipes  - " Meriplex dressings for sacral ulcer     Follow Up and recommended labs and tests   Order Comments: - BMP, CBC with differential, and liver function panel after completion of Amoxicillin / Fluconazole regimen (3/22/17)     MD face to face encounter   Order Comments: Documentation of Face to Face and Certification for Home Health Services    I certify that patient: Gem Jade is under my care and that I, or a nurse practitioner or physician's assistant working with me, had a face-to-face encounter that meets the physician face-to-face encounter requirements with this patient on: 3/15/2017.    This encounter with the patient was in whole, or in part, for the following medical condition, which is the primary reason for home health care: altered mental status.    I certify that, based on my findings, the following services are medically necessary home health services: Nursing.    My clinical findings support the need for the above services because: Nurse is needed: To assess mental status after changes in medications or other medical regimen..    Further, I certify that my clinical findings support that this patient is homebound (i.e. absences from home require considerable and taxing effort and are for medical reasons or Alev[employer] services or infrequently or of short duration when for other reasons) because: Requires assistance of another person or specialized equipment to access medical services because patient: Requires supervision of another for safe transfer...    Based on the above findings. I certify that this patient is confined to the home and needs intermittent skilled nursing care, physical therapy and/or speech therapy.  The patient is under my care, and I have initiated the establishment of the plan of care.  This patient will be followed by a physician who will periodically review the plan of care.  Physician/Provider to provide follow up care: Marivel Barcenas    Attending hospital physician  (the Medicare certified [facility] provider): Radha Boyce  Physician Signature: See electronic signature associated with these discharge orders.  Date: 3/15/2017     Tubes and drains   Order Comments: You are going home with the following tubes or drains: rico catheter.  Please change rico catheter every three weeks. Utilize post-insertion Rico care wipes as instructed.     Full Code     Full Code     Diet   Order Comments: Follow this diet upon discharge: Renal Diet (non-dialysis)   Order Specific Question Answer Comments   Is discharge order? Yes               Discharge Disposition:   Discharged to home      Patient was seen and discussed with Dr. Radha Boyce.    Please feel free to contact me with any questions    Alexi Richard DDS  Oral & Maxillofacial Surgery  PGY-1

## 2017-03-16 NOTE — TELEPHONE ENCOUNTER
Chang called on behalf of patient who just got home from the hospital.    He stated they have directions to start taking amoxicillian and diflucan, but they did not receive any of the meds that were ordered at discharge.    He is concerned about not starting antibiotics right away.    Call back to [phone]

## 2017-03-16 NOTE — TELEPHONE ENCOUNTER
Chang called back and said to disregard the last message.  Medications were sent to [facility].

## 2017-03-17 ENCOUNTER — OFFICE VISIT (OUTPATIENT)
Dept: FAMILY MEDICINE | Facility: CLINIC | Age: 72
End: 2017-03-17
Payer: COMMERCIAL

## 2017-03-17 VITALS — SYSTOLIC BLOOD PRESSURE: 130 MMHG | HEART RATE: 63 BPM | OXYGEN SATURATION: 97 % | DIASTOLIC BLOOD PRESSURE: 60 MMHG

## 2017-03-17 DIAGNOSIS — E66.01 MORBID OBESITY, UNSPECIFIED OBESITY TYPE (H): ICD-10-CM

## 2017-03-17 DIAGNOSIS — N17.9 AKI (ACUTE KIDNEY INJURY) (H): Primary | ICD-10-CM

## 2017-03-17 DIAGNOSIS — I10 BENIGN ESSENTIAL HYPERTENSION: ICD-10-CM

## 2017-03-17 DIAGNOSIS — Z09 HOSPITAL DISCHARGE FOLLOW-UP: ICD-10-CM

## 2017-03-17 DIAGNOSIS — L89.159: ICD-10-CM

## 2017-03-17 DIAGNOSIS — E11.42 DIABETIC POLYNEUROPATHY ASSOCIATED WITH TYPE 2 DIABETES MELLITUS (H): ICD-10-CM

## 2017-03-17 DIAGNOSIS — R41.0 DISORIENTATION: ICD-10-CM

## 2017-03-17 PROCEDURE — 99350 HOME/RES VST EST HIGH MDM 60: CPT | Performed by: NURSE PRACTITIONER

## 2017-03-17 RX ORDER — GABAPENTIN 300 MG/1
CAPSULE ORAL
Qty: 180 CAPSULE | Refills: 3 | Status: ON HOLD | OUTPATIENT
Start: 2017-03-17 | End: 2017-04-28

## 2017-03-17 NOTE — PLAN OF CARE
Problem: Goal Outcome Summary  Goal: Goal Outcome Summary  Physical Therapy Discharge Summary     Reason for therapy discharge:    Discharged to home.     Progress towards therapy goal(s). See goals on Care Plan in [facility] electronic health record for goal details.  Goals not met.  Barriers to achieving goals:   discharge from facility.     Therapy recommendation(s):    Continued therapy is recommended.  Rationale/Recommendations:  address deficits with functional mobility.

## 2017-03-17 NOTE — MR AVS SNAPSHOT
After Visit Summary   3/17/2017    Gem Jade    MRN: [id number]           Patient Information     Date Of Birth          1945        Visit Information        Provider Department      3/17/2017 11:30 AM Marivel Barcenas APRN CNP [facility]        Today's Diagnoses     ROB (acute kidney injury) (H)    -  1    Disorientation        Morbid obesity, unspecified obesity type (H)        Benign essential hypertension        Decubitus ulcer of sacral region, unspecified pressure ulcer stage        Diabetic polyneuropathy associated with type 2 diabetes mellitus (H)        Hospital discharge follow-up          Care Instructions    I have ordered you labs to be done on 3/22, our lab team will call you to set up an appt     We will increase your gabapentin back up to your prior hospital dose to help with your neuropathy    We will continue your other meds without change    We will explore anti-microbial impregnated catheters         Follow-ups after your visit        Your next 10 appointments already scheduled     Apr 05, 2017  2:00 PM CDT   Return Visit with ANTHONY Antonio CNP   [facility] ([facility]    [address] [place], [place] [address] [phone]            Apr 18, 2017  2:00 PM CDT   Office Visit with Conchita Toledo [facility] ([facility])    [address] [place], [place] [id number] [phone]           Bring a current list of meds and any records pertaining to this visit.  For Physicals, please bring immunization records and any forms needing to be filled out.  Please arrive 10 minutes early to complete paperwork.            May 03, 2017  2:00 PM CDT   Return Visit with ANTHONY Antonio CNP   [facility] ([facility]    [address] [place] [address]  [address]  [facility] [address]   [phone]            2017  2:00 PM CDT   Return Visit with ANTHONY Antonio CNP   [facility] ([facility]    [address]  [address]  [facility] [address]   [phone]              Who to contact     If you have questions or need follow up information about today's clinic visit or your schedule please contact [facility] directly at [phone].  Normal or non-critical lab and imaging results will be communicated to you by [employer], letter or phone within 4 business days after the clinic has received the results. If you do not hear from us within 7 days, please contact the clinic through [employer] or phone. If you have a critical or abnormal lab result, we will notify you by phone as soon as possible.  Submit refill requests through [employer] or call your pharmacy and they will forward the refill request to us. Please allow 3 business days for your refill to be completed.          Additional Information About Your Visit        [employer] Information     [employer] lets you send messages to your doctor, view your test results, renew your prescriptions, schedule appointments and more. To sign up, go to [email or web address] . Click on \"Log in\" on the left side of the screen, which will take you to the Welcome page. Then click on \"Sign up Now\" on the right side of the page.     You will be asked to enter the access code listed below, as well as some personal information. Please follow the directions to create your username and password.     Your access code is: [email or web address]  Expires: 2017  9:50 AM     Your access code will  in 90 days. If you need help or a new code, please call your [facility] clinic or [phone].        Care EveryWhere ID     This is your Care EveryWhere ID. This could be used by other organizations to access your [facility] medical records  [id number]        Your Vitals Were  "    Pulse Pulse Oximetry                63 97%           Blood Pressure from Last 3 Encounters:   03/21/17 98/58   03/17/17 130/60   03/15/17 134/59    Weight from Last 3 Encounters:   03/15/17 201 lb (91.2 kg)   02/20/17 205 lb 4.8 oz (93.1 kg)   09/03/16 201 lb 8 oz (91.4 kg)                 Today's Medication Changes          These changes are accurate as of: 3/17/17 11:59 PM.  If you have any questions, ask your nurse or doctor.               These medicines have changed or have updated prescriptions.        Dose/Directions    gabapentin 300 MG capsule   Commonly known as:  NEURONTIN   This may have changed:    - how much to take  - how to take this  - when to take this  - additional instructions   Used for:  Diabetic polyneuropathy associated with type 2 diabetes mellitus (H)        Take 2 tabs 2x day for one week then increase to 3 tabs 2x daily   Quantity:  180 capsule   Refills:  3       TraMADol HCl 50 MG Tbdp   Indication:  Moderate to Moderately Severe Pain   This may have changed:  when to take this   Used for:  Chronic shoulder pain, unspecified laterality        Dose:  50 mg   Take 50 mg by mouth 3 times daily as needed   Quantity:  90 tablet   Refills:  3            Where to get your medicines      These medications were sent to [facility] - [place], [place] - [address] [place] [address]  [address] [facility] [address]    Hours:  Mon-Fri 8:30am-5:00pm Toll Free [phone] Phone:  [phone]     gabapentin 300 MG capsule                Primary Care Provider Office Phone # Fax #    ANTHONY Antonio [phone]        [facility] [address] [facility] [address]  [facility] [address]        Thank you!     Thank you for choosing [facility]  for your care. Our goal is always to provide you with excellent care. Hearing back from our patients is one way we can continue to improve our services. Please take a few minutes to  complete the written survey that you may receive in the mail after your visit with us. Thank you!             Your Updated Medication List - Protect others around you: Learn how to safely use, store and throw away your medicines at www.disposemymeds.org.          This list is accurate as of: 3/17/17 11:59 PM.  Always use your most recent med list.                   Brand Name Dispense Instructions for use    * ACE NOT PRESCRIBED (INTENTIONAL)      1 each continuous prn ACE Inhibitor not prescribed due to Worsening renal function on ACE/ARB therapy       ACETAMINOPHEN PO      Take 325-650 mg by mouth every 4 hours as needed.       alcohol swab prep pads     1 Box    Use to swab area of injection/yvon and vials as directed.       amoxicillin 500 MG capsule    AMOXIL    14 capsule    Take 1 capsule (500 mg) by mouth every 12 hours for 7 days       ASPIRIN PO      Take 81 mg by mouth daily.       blood glucose monitoring test strip    ACCU-CHEK COMPACT PLUS    100 strip    Use to test blood sugar 3 times daily or as directed.       cloNIDine 0.1 MG tablet    CATAPRES    180 tablet    Take 1 tablet (0.1 mg) by mouth 2 times daily       clotrimazole 1 % cream    LOTRIMIN    40 g    Apply topically 2 times daily Apply to buttocks twice a day until rash resolved.       Cranberry 400 MG Tabs      Take 400 mg by mouth 2 times daily       cyanocobalamin 1000 MCG/ML injection    VITAMIN B12    3 mL    Inject 1 mL (1,000 mcg) into the muscle every 30 days       diphenoxylate-atropine 2.5-0.025 MG per tablet    LOMOTIL    50 tablet    Take 1 tablet by mouth 4 times daily as needed for diarrhea       famotidine 20 MG tablet    PEPCID    60 tablet    Take 1 tablet (20 mg) by mouth 2 times daily       fluconazole 200 MG tablet    DIFLUCAN    7 tablet    Take 1 tablet (200 mg) by mouth daily for 7 days       gabapentin 300 MG capsule    NEURONTIN    180 capsule    Take 2 tabs 2x day for one week then increase to 3 tabs 2x daily        GLUCAGON EMERGENCY 1 MG kit   Generic drug:  glucagon     1 each    Inject 1 mg into the muscle once       insulin glargine 100 UNIT/ML injection    LANTUS SOLOSTAR    15 mL    Inject 14 units under the skin daily.       insulin pen needle 30G X 8 MM     100 each    Use as directed with Lantus       labetalol 200 MG tablet    NORMODYNE    180 tablet    Take 1 tablet (200 mg) by mouth 2 times daily       lactobacillus rhamnosus (GG) capsule     60 capsule    Take 1 capsule by mouth 2 times daily       levothyroxine 50 MCG tablet    SYNTHROID    90 tablet    Take 1 tablet (50 mcg) by mouth daily       loratadine 10 MG tablet    CLARITIN    90 tablet    Take 1 tablet (10 mg) by mouth daily       meclizine 12.5 MG tablet    ANTIVERT    90 tablet    Take 1 tablet (12.5 mg) by mouth 3 times daily       NovoLOG FLEXPEN 100 UNIT/ML injection   Generic drug:  insulin aspart     15 mL    Inject 7 units before meals BID. At bedtime use following sliding scale:  150-200 5u 201-250 7u 251-300 9u 301-350 11u 351+ 13u       NYSTOP 215469 UNIT/GM Powd powder     60 g    Externally apply 1 g topically 3 times daily as needed       ondansetron 4 MG ODT tab    ZOFRAN ODT    20 tablet    Take 1-2 tablets (4-8 mg) by mouth every 8 hours as needed for nausea       * order for DME     3 catheter    Equipment being ordered: Johnson Catheters - 18 french       * order for DME     100 pad    Equipment being ordered: Blue Chux [facility]       * order for DME     1 each    Equipment being ordered: [employer] FX CPAP interface (nasal pillows), size XS.       OXcarbazepine 300 MG tablet    TRILEPTAL    180 tablet    Take 1 tablet (300 mg) by mouth 2 times daily       phenazopyridine 100 MG tablet    PYRIDIUM    6 tablet    Take 1 tablet (100 mg) by mouth 3 times daily as needed for irritation       pravastatin 10 MG tablet    PRAVACHOL    90 tablet    Take 1 tablet (10 mg) by mouth daily       predniSONE 2.5 MG tablet    DELTASONE  "   30 tablet    Take 1 tablet (2.5 mg) by mouth daily       simethicone 125 MG Chew chewable tablet    MYLICON    120 tablet    Take 1 tablet (125 mg) by mouth as needed for intestinal gas       sirolimus 0.5 MG tablet    RAPAMUNE - GENERIC EQUIVALENT     Take 1.5 mg by mouth daily       syringe/needle (disp) 22G X 1-1/2\" 3 ML Misc    B-D SYRINGE/NEEDLE    50 each    Inject 1 Syringe into the muscle every 30 days       tacrolimus 1 MG capsule    PROGRAF - GENERIC EQUIVALENT    120 capsule    Take 1 capsule (1 mg) by mouth 2 times daily       TraMADol HCl 50 MG Tbdp     90 tablet    Take 50 mg by mouth 3 times daily as needed       * Notice:  This list has 4 medication(s) that are the same as other medications prescribed for you. Read the directions carefully, and ask your doctor or other care provider to review them with you.      "

## 2017-03-17 NOTE — PATIENT INSTRUCTIONS
I have ordered you labs to be done on 3/22, our lab team will call you to set up an appt     We will increase your gabapentin back up to your prior hospital dose to help with your neuropathy    We will continue your other meds without change    We will explore anti-microbial impregnated catheters

## 2017-03-17 NOTE — Clinical Note
Labs ordered, I think I sent this already but they need to be done this week by our lab team she is a hard draw

## 2017-03-17 NOTE — PROGRESS NOTES
SUBJECTIVE:                                                    Gem Jade is a 68 year old female who is seen in her home due to inability to leave the home unassisted, morbid obesity, severe vertigo, generalized weakness, chronic pain, bed bound, rarely able to get up in the , seen today for the following health issues:       Hospital Follow-up Visit:    Hospital/Nursing Home/IP Rehab Facility: [facility]  Date of Admission: 3/8/17  Date of Discharge: 3/15/17  Reason(s) for Admission: Acute kidney failure            Problems taking medications regularly:  None       Medication changes since discharge: clonidine and trileptal were never stopped but gabapentin was reduced from 1800mg daily down to 300mg daily and patient is complaining of increased neuropathy in her legs, unable to sleep, will maintain her clonidine and trileptal at their current dose and increase gabapentin back up to pre-hospitalization dose, dose increase schedule discussed, conflicting documentation in the hospital chart about her transplant drug Sirolimus, TC to Shabbir Jacinto her transplant RN, she is to maintain her current dose until repeat labs and then they will contact her if changes need to be made        Problems adhering to non-medication therapy:  Recommended a neuropsych eval given altered mental status, which occurs with infections and antibiotics, also recommended a urology visit for further testing, mainly a urodynamic study to determine neurogenetic bladder, although the chance of her being able to get off her rico cath is very low,  Discussed referrals with patient and her family and given she needs ambulance transport to get into any appts they have opted not to follow through with these referrals, which I support      Summary of hospitalization:  [facility] discharge summary reviewed  Diagnostic Tests/Treatments reviewed.  Follow up needed: labs needed after completion of antibiotic and antifungal, BMP, CBC  with diff, hepatic panel  Other Healthcare Providers Involved in Patient s Care:         Homecare and Specialist appointment - Urology and Neuropsych  Update since discharge: improved. Orientated to person, place and time, engaged in interview, aware of the meds, no urinary issues, no pain    Post Discharge Medication Reconciliation: discharge medications reconciled and changed, per note/orders (see AVS).  Plan of care communicated with patient and family     Coding guidelines for this visit:  Type of Medical   Decision Making Face-to-Face Visit       within 7 Days of discharge Face-to-Face Visit        within 14 days of discharge   Moderate Complexity [id number]   High Complexity [id number]          [facility] Discharge Summary     Gem Jade MRN# [id number]   Age: 71 year old YOB: 1945      Date of Admission:  3/8/2017  Date of Discharge::  3/15/2017 1:18 PM  Admitting Physician:  Wang Mahajan MD  Discharge Physician:  [unfilled]      Home clinic: [facility] with ANTHONY Edgar     RECOMMENDATIONS UPON DISCHARGE  - Follow-up with Urology service for continued evaluation of neurogenic bladder and in-dwelling catheter  - Neuropsych referral placed for evaluation of AMS in outpatient setting  - Consider alternatives / stop clonidine, gabapentin, and oxcarbazepine due to potential influence on patient's history of AMS  - Changing of Johnson catheter every 3 weeks to help prevent recurrent UTIs  - Follow-up with PCP Marivel Barcenas, on 4/5/17, as scheduled   - BMP, LFT panel, and CBC w/ differential upon completion of amoxicillin / fluconazole regimen (3/22/17)  - Recommending exchanging Johnson catheter once every three weeks.  Admission Diagnoses:   Acute kidney injury (H) [N17.9]     Discharge Diagnoses:   Acute kidney injury  Altered mental status  Urinary tract infection  Sacral ulcer  START taking these medications     Details   amoxicillin (AMOXIL) 500 MG  capsule Take 1 capsule (500 mg) by mouth every 12 hours for 7 days, Disp-14 capsule, R-0, Fax       fluconazole (DIFLUCAN) 200 MG tablet Take 1 tablet (200 mg) by mouth daily for 7 days, Disp-7 tablet, R-0, Fax       meclizine (ANTIVERT) 12.5 MG tablet Take 1 tablet (12.5 mg) by mouth 3 times daily, Disp-90 tablet, R-1, Fax                 CONTINUE these medications which have CHANGED     Details   gabapentin (NEURONTIN) 300 MG capsule Take 1 capsule (300 mg) by mouth At Bedtime, R-0, No Print Out       tacrolimus (PROGRAF - GENERIC EQUIVALENT) 1 MG capsule Take 1 capsule (1 mg) by mouth 2 times daily, Disp-120 capsule, R-6, No Print Out     Consultations:   Consultation during this admission received from urology and transplant nephrology.       Brief History of Illness:   Gem Jade is a 72 y/o female with PMH significant for kidney transplant (1999), complex UTIs, morbid obesity, mobility issues, fatigue, DM-II, chronic pain, hypothyroidism, HTN, and HLD who was admitted for acute kidney injury and altered mental status in the setting of urinary tract infection. She was previously admited from 2/16/17 - 2/20/17 due to altered mental status with UTI and hydronephrosis of transplant kidney.   Hospital Course:   # ROB  Creatinine 3.49 on 3/6/2017, trended down to 1.70 on discharge, continuing towards baseline of 0.8 - 1.0.   - Transplant Nephrology consulted, no immediate concern about transplant rejection. Tacrolimus and Sirolimus levels were trended and found adequate during admission. Transplant Nephrology agreed to patient discharge.      # Altered mental status in setting of UTI vs metabolic encephalopathy  Patient altered mental status improved from admission per patient and her .   - UTI treated to eliminate as possible etiology (see below)   - Occupational therapy noted no cognitive impairment. Continued work with PT/OT to increase range of motion and mobility. Meclizine started for vertigo.  -  Recommending outpatient Neuropsych evaluation.    - Recommending considering discontinuing clonidine, gabapentin, and oxcarbazepine in order to limit pharmaceutical manifestations of AMS in outpatient setting.       # Sacral ulcer  Seen on previous admit in February 2017.   - Appreciate dressing and recs from Wound Care Nurse      # Urinary tract infection from E.faecalis and Candida  Cloudy urine identified upon admission. Concerning for UTI due to previous presence of WBCs and leukocyte esterase. Patient started on IV ceftriaxone upon admission.  - Chronic indwelling Johnson exchanged 3/9/17. Replaced again after three days of antibiotic / antifungal meds (3/13/17).   - Blood cultures (3/8/17) - Negative  - Continued on Ceftriaxone from admission until 3/11/17 and transitioned to Amoxicillin on 3/11/17 per Enterococcus faecalis sensitivities. Planning total of 14 day course of antibiotics (until 3/22/17).  - Fluconazole 200mg QDay continued for yeast coverage. Planning total of 14 day course of antifungals (until 3/22/17).  - Urology consulted, who recommended outpatient follow-up with urodynamic study to assess need for chronic indwelling catheter      # Hypertension  - Continued home clonidine and labetalol      # HLD  - Continued home pravastatin      # Hypothyroid  - Continued home levothyroxine      # Diabetes:   - Continued home insulin with sliding scale  - POC glucose checks before were in place before meals and bedtime    Follow Up and recommended labs and tests   Order Comments: - BMP, CBC with differential, and liver function panel after completion of Amoxicillin / Fluconazole regimen (3/22/17)      Impression: Urology consult     Gem Jade is a 71 year old female with a history of kidney transplant, DM and presumed neurogenic bladder managed with a chronic indwelling catheter who was admitted in setting of ROB and UTI.      Recurrent UTI most likely attributable to immunosuppressed state with  chronic indwelling catheter. There is no evidence of contributory obstructive process during the current episode. As such, there is no indication for urologic intervention at this time. Patient may, however, benefit from further outpatient evaluation with urodynamics to establish suspected neurogenic bladder physiology. Given duration of catheter use, however, the likelihood of resuming catheter-free voiding is unlikely.         Problem list and histories reviewed & adjusted, as indicated.  Additional history: as documented    Recent Labs   Lab Test  03/15/17   0731  03/14/17   0701   03/09/17   1310   03/08/17   1243   02/16/17 2007 12/05/16   1210   09/05/16   0617   08/30/16   0032   06/13/16   1140   12/20/15   0853   02/05/15   1250   05/16/13   1240   A1C   --    --    --    --    --    --    --    --    --   7.4*   --    --    --    --    --   6.7*   --   5.5   < >  6.9*   < >  7.2*   LDL   --    --    --    --    --    --    --    --    --   64   --    --    --    --    --    --    --    --    --   18   --   149*   HDL   --    --    --    --    --    --    --    --    --   46*   --    --    --    --    --    --    --    --    --   43*   --   42*   TRIG   --    --    --    --    --    --    --    --    --   272*   --    --    --    --    --    --    --    --    --   223*   --   262*   ALT   --    --    --    --    --   <6   --   14   --    --    --   10   < >  6   < >   --    < >   --    < >   --    < >   --    CR  1.70*  1.82*   < >   --    < >  3.37*   < >  1.17*   < >   --    < >  0.54   < >   --    < >   --    < >  0.74   < >  0.67   < >  0.70   GFRESTIMATED  30*  27*   < >   --    < >  13*   < >  46*   < >   --    < >  >90  Non  GFR Calc     < >   --    < >   --    < >  78   < >  87   < >  84   GFRESTBLACK  36*  33*   < >   --    < >  16*   < >  55*   < >   --    < >  >90   GFR Calc     < >   --    < >   --    < >  >90   GFR Calc     < >  >90    American GFR Calc     < >  >90   POTASSIUM  3.9  3.8   < >   --    < >  4.6   < >  4.3   < >   --    < >  4.0   < >   --    < >   --    < >  3.3*   < >  4.3   < >  3.9   TSH   --    --    --   1.20   --    --    --    --    --    --    --    --    --   1.01   --    --    < >   --    --    --    < >   --     < > = values in this interval not displayed.      BP Readings from Last 3 Encounters:   03/15/17 134/59   03/01/17 120/58   02/20/17 186/54    Wt Readings from Last 3 Encounters:   03/15/17 201 lb (91.2 kg)   02/20/17 205 lb 4.8 oz (93.1 kg)   09/03/16 201 lb 8 oz (91.4 kg)            Labs reviewed in EPIC    Reviewed and updated as needed this visit by clinical staff       Reviewed and updated as needed this visit by Provider         ROS:  Constitutional, HEENT, cardiovascular, pulmonary, gi and gu systems are negative, except as otherwise noted.    OBJECTIVE:                                                    /60 (BP Location: Other (Comment), Patient Position: Supine, Cuff Size: Adult Regular)  Pulse 63  SpO2 97%  There is no height or weight on file to calculate BMI.  GENERAL: alert, mild distress, obese, pale, fatigued and laying in her bed on her back, engaged in visit,   NECK: no adenopathy, no asymmetry, masses, or scars and thyroid normal to palpation  RESP: lungs clear to auscultation - no rales, rhonchi or wheezes  CV: regular rate and rhythm, normal S1 S2, no S3 or S4, no murmur, click or rub, no peripheral edema   ABDOMEN: soft, nontender, no masses and bowel sounds normal  SKIN: reports she had buttock abrasions, no depth, healing and using Calmoseptine, unable to visualize today, will have [facility] nurse call with any concerns  .  MS: no gross musculoskeletal defects noted, no edema  PSYCH:  Affect normal and bright, orientated, joking and laughing, very verbose, at baseline     Diagnostic Test Results:  Results for orders placed or performed during the hospital encounter of 03/08/17   US Renal  Transplant    Narrative    EXAMINATION: US RENAL TRANSPLANT, 3/8/2017 3:12 PM     COMPARISON: 2/16/2017    HISTORY: Acute kidney injury    TECHNIQUE:  Grey-scale, color Doppler and spectral flow analysis.    FINDINGS:  The transplant kidney is located right lower quadrant, and measures  13.1 cm length. Parenchyma is of normal thickness and echogenicity. No  focal lesions. No hydronephrosis. No perinephric fluid collection.      Renal Vein Flow:  12 cm/sec at hilum.   40 cm/sec at anastomosis.    Upper Pole Arcuate artery:  0.83 resistive index.    Mid pole Arcuate artery:  0.86 resistive index.     Lower Pole Arcuate artery:  0.84 resistive index.     Renal artery flow:   89 cm/sec peak systolic at hilum.  104 cm/sec peak systolic at anastomosis.    Iliac artery flow:  152 cm/sec peak systolic above anastomosis.  90 cm/sec peak systolic below anastomosis.    Iliac vein flow:  Patent above and below        Impression    IMPRESSION:    1. Mildly increased resistive indices in arcuate arteries, slightly  decreased from previous study. Patent Doppler examination of the  transplant vasculature. The increased RI's are of concern for  rejection.  2. Resolution of hydronephrosis.      I have personally reviewed the examination and initial interpretation  and I agree with the findings.    BRIAN RICHARD MD   Comprehensive metabolic panel   Result Value Ref Range    Sodium 135 133 - 144 mmol/L    Potassium 4.6 3.4 - 5.3 mmol/L    Chloride 98 94 - 109 mmol/L    Carbon Dioxide 28 20 - 32 mmol/L    Anion Gap 8 3 - 14 mmol/L    Glucose 61 (L) 70 - 99 mg/dL    Urea Nitrogen 42 (H) 7 - 30 mg/dL    Creatinine 3.37 (H) 0.52 - 1.04 mg/dL    GFR Estimate 13 (L) >60 mL/min/1.7m2    GFR Estimate If Black 16 (L) >60 mL/min/1.7m2    Calcium 10.2 (H) 8.5 - 10.1 mg/dL    Bilirubin Total 0.3 0.2 - 1.3 mg/dL    Albumin 3.0 (L) 3.4 - 5.0 g/dL    Protein Total 7.9 6.8 - 8.8 g/dL    Alkaline Phosphatase 87 40 - 150 U/L    ALT <6 0 - 50 U/L     AST 13 0 - 45 U/L   CBC with platelets differential   Result Value Ref Range    WBC 5.0 4.0 - 11.0 10e9/L    RBC Count 3.75 (L) 3.8 - 5.2 10e12/L    Hemoglobin 10.9 (L) 11.7 - 15.7 g/dL    Hematocrit 34.5 (L) 35.0 - 47.0 %    MCV 92 78 - 100 fl    MCH 29.1 26.5 - 33.0 pg    MCHC 31.6 31.5 - 36.5 g/dL    RDW 15.1 (H) 10.0 - 15.0 %    Platelet Count 137 (L) 150 - 450 10e9/L    Diff Method Automated Method     % Neutrophils 69.4 %    % Lymphocytes 21.6 %    % Monocytes 5.6 %    % Eosinophils 3.0 %    % Basophils 0.2 %    % Immature Granulocytes 0.2 %    Nucleated RBCs 0 0 /100    Absolute Neutrophil 3.5 1.6 - 8.3 10e9/L    Absolute Lymphocytes 1.1 0.8 - 5.3 10e9/L    Absolute Monocytes 0.3 0.0 - 1.3 10e9/L    Absolute Eosinophils 0.2 0.0 - 0.7 10e9/L    Absolute Basophils 0.0 0.0 - 0.2 10e9/L    Abs Immature Granulocytes 0.0 0 - 0.4 10e9/L    Absolute Nucleated RBC 0.0    UA reflex to Microscopic and Culture   Result Value Ref Range    Color Urine Yellow     Appearance Urine Cloudy     Glucose Urine Negative NEG mg/dL    Bilirubin Urine Negative NEG    Ketones Urine Negative NEG mg/dL    Specific Gravity Urine 1.015 1.003 - 1.035    Blood Urine Moderate (A) NEG    pH Urine 6.5 5.0 - 7.0 pH    Protein Albumin Urine 300 (A) NEG mg/dL    Urobilinogen mg/dL Normal 0.0 - 2.0 mg/dL    Nitrite Urine Negative NEG    Leukocyte Esterase Urine Large (A) NEG    Source Catheterized Urine     RBC Urine 154 (H) 0 - 2 /HPF    WBC Urine >182 (H) 0 - 2 /HPF    WBC Clumps Present (A) NEG /HPF    Bacteria Urine Moderate (A) NEG /HPF    Yeast Urine Many (A) NEG /HPF    Transitional Epi 2 (H) 0 - 1 /HPF    Mucous Urine Present (A) NEG /LPF   INR   Result Value Ref Range    INR 1.01 0.86 - 1.14   Lactic acid whole blood   Result Value Ref Range    Lactic Acid 0.6 (L) 0.7 - 2.1 mmol/L   CRP inflammation   Result Value Ref Range    CRP Inflammation 7.5 0.0 - 8.0 mg/L   Glucose by meter   Result Value Ref Range    Glucose 115 (H) 70 - 99 mg/dL    Sodium random urine   Result Value Ref Range    Sodium Urine mmol/L 61 mmol/L   Potassium random urine   Result Value Ref Range    Potassium Urine mmol/L 19 mmol/L   Chloride random urine   Result Value Ref Range    Chloride Urine mmol/L 54 mmol/L   Platelet count   Result Value Ref Range    Platelet Count 128 (L) 150 - 450 10e9/L   Tacrolimus level   Result Value Ref Range    Tacrolimus Last Dose Not Provided     Tacrolimus Level 4.7 (L) 5.0 - 15.0 ug/L   Glucose by meter   Result Value Ref Range    Glucose 81 70 - 99 mg/dL   Glucose by meter   Result Value Ref Range    Glucose 135 (H) 70 - 99 mg/dL   Renal panel   Result Value Ref Range    Sodium 141 133 - 144 mmol/L    Potassium 4.1 3.4 - 5.3 mmol/L    Chloride 107 94 - 109 mmol/L    Carbon Dioxide 26 20 - 32 mmol/L    Anion Gap 8 3 - 14 mmol/L    Glucose 138 (H) 70 - 99 mg/dL    Urea Nitrogen 39 (H) 7 - 30 mg/dL    Creatinine 2.94 (H) 0.52 - 1.04 mg/dL    GFR Estimate 16 (L) >60 mL/min/1.7m2    GFR Estimate If Black 19 (L) >60 mL/min/1.7m2    Calcium 8.9 8.5 - 10.1 mg/dL    Phosphorus 3.2 2.5 - 4.5 mg/dL    Albumin 2.7 (L) 3.4 - 5.0 g/dL   CBC with platelets   Result Value Ref Range    WBC 3.5 (L) 4.0 - 11.0 10e9/L    RBC Count 3.34 (L) 3.8 - 5.2 10e12/L    Hemoglobin 9.4 (L) 11.7 - 15.7 g/dL    Hematocrit 30.6 (L) 35.0 - 47.0 %    MCV 92 78 - 100 fl    MCH 28.1 26.5 - 33.0 pg    MCHC 30.7 (L) 31.5 - 36.5 g/dL    RDW 14.9 10.0 - 15.0 %    Platelet Count 107 (L) 150 - 450 10e9/L   Glucose by meter   Result Value Ref Range    Glucose 157 (H) 70 - 99 mg/dL   Calcium ionized whole blood   Result Value Ref Range    Calcium Ionized Whole Blood 4.9 4.4 - 5.2 mg/dL   CK total   Result Value Ref Range    CK Total 36 30 - 225 U/L   Folate   Result Value Ref Range    Folate 5.2 (L) >5.4 ng/mL   Vitamin B12   Result Value Ref Range    Vitamin B12 1402 (H) 193 - 986 pg/mL   TSH   Result Value Ref Range    TSH 1.20 0.40 - 4.00 mU/L   T4 free   Result Value Ref Range    T4  Free 0.72 (L) 0.76 - 1.46 ng/dL   Hepatitis B surface antigen   Result Value Ref Range    Hep B Surface Agn Nonreactive NR   Hepatits C antibody   Result Value Ref Range    Hepatitis C Antibody  NR     Nonreactive   Assay performance characteristics have not been established for newborns,   infants, and children     CMV DNA quantification   Result Value Ref Range    CMV DNA Quantitation Specimen Plasma, EDTA anticoagulant     CMV Quant IU/mL  CMVND [IU]/mL     CMV DNA Not Detected   The JON AmpliPrep/JON TaqMan CMV Test is an FDA-approved in vitro nucleic   acid amplification test for the quantitation of cytomegalovirus DNA in human   plasma (EDTA plasma) using the [employer] AmpliPrep Instrument for automated viral   nucleic acid extraction and the [employer] TaqMan Analyzer or [employer] TaqMan for   automated Real Time amplification and detection of the viral nucleic acid   target.   Titer results are reported in International Units/mL (IU/mL using 1st WHO   International standard for Human Cytomegalovirus for Nucleic Acid Amplification   based assays. The conversion factor between CMV DNA copis/mL (as defined by the   Roche JON TaqMan CMV test) and International Units is the CMV DNA   concentration in IU/mL x 1.1 copies/IU = CMV DNA in copies/mL.   This assay has received FDA approval for the testing of human plasma only. The   Infectious Disease Diagnostic Laboratory at the [facility], [place], has validated the pe rformance characteristics of the Roche   CMV assay for plasma, bronchial alveolar lavage/wash and urine.      Log IU/mL of CMVQNT Not Calculated <2.1 [Log_IU]/mL   HIV Antigen Antibody Combo   Result Value Ref Range    HIV Antigen Antibody Combo  NR     Nonreactive   HIV-1 p24 Ag & HIV-1/HIV-2 Ab Not Detected     BK virus PCR quantitative   Result Value Ref Range    BK Virus Specimen       Plasma, EDTA anticoagulant  CORRECTED ON 03/09 AT 1319: PREVIOUSLY REPORTED AS Plasma       BK Virus Result BK Virus DNA Not Detected BKNEG copies/mL    BK Virus Log  <2.7 Log copies/mL     Not Calculated   The Real-Time quantitative BK Virus assay was developed and its performance   characteristics determined by the Infectious Diseases Diagnostic Laboratory at   the [facility] in [place], [place]. The   primers and probes for each analyte are Analyte Specific Reagents (ASRs)   manufactured by [employer].   ASRs are used in many laboratory tests necessary for standard medical care and   generally do not require U.S. Food and Drug Administration approval. The FDA   has determined that such clearance or approval is not necessary.   This test is used for clinical purposes. It should not be regarded as   investigational or for research. This laboratory is certified under the   Clinical Laboratory Improvement Amendments of 1988 (CLIA-88) as qualified to   perform high complexity clinical laboratory testing.     Glucose by meter   Result Value Ref Range    Glucose 115 (H) 70 - 99 mg/dL   Glucose by meter   Result Value Ref Range    Glucose 188 (H) 70 - 99 mg/dL   Glucose by meter   Result Value Ref Range    Glucose 273 (H) 70 - 99 mg/dL   EBV DNA PCR Quantitative Whole Blood   Result Value Ref Range    EBV DNA Copies/mL 31015 (A) EBVNEG [Copies]/mL    EBV DNA Log of Copies 5.0 (H) <2.7 [Log_copies]/mL   Tacrolimus level   Result Value Ref Range    Tacrolimus Last Dose Not Provided     Tacrolimus Level 4.1 (L) 5.0 - 15.0 ug/L   Sirolimus level   Result Value Ref Range    Sirolimus Last Dose Not Provided     Sirolimus Level 7.5 5.0 - 15.0 ug/L   CBC with platelets differential   Result Value Ref Range    WBC 4.3 4.0 - 11.0 10e9/L    RBC Count 3.06 (L) 3.8 - 5.2 10e12/L    Hemoglobin 8.6 (L) 11.7 - 15.7 g/dL    Hematocrit 28.3 (L) 35.0 - 47.0 %    MCV 93 78 - 100 fl    MCH 28.1 26.5 - 33.0 pg    MCHC 30.4 (L) 31.5 - 36.5 g/dL    RDW 15.2 (H) 10.0 - 15.0 %    Platelet Count 115 (L) 150  - 450 10e9/L    Diff Method Automated Method     % Neutrophils 56.7 %    % Lymphocytes 31.7 %    % Monocytes 6.5 %    % Eosinophils 4.4 %    % Basophils 0.5 %    % Immature Granulocytes 0.2 %    Nucleated RBCs 0 0 /100    Absolute Neutrophil 2.5 1.6 - 8.3 10e9/L    Absolute Lymphocytes 1.4 0.8 - 5.3 10e9/L    Absolute Monocytes 0.3 0.0 - 1.3 10e9/L    Absolute Eosinophils 0.2 0.0 - 0.7 10e9/L    Absolute Basophils 0.0 0.0 - 0.2 10e9/L    Abs Immature Granulocytes 0.0 0 - 0.4 10e9/L    Absolute Nucleated RBC 0.0    Basic metabolic panel   Result Value Ref Range    Sodium 137 133 - 144 mmol/L    Potassium 4.0 3.4 - 5.3 mmol/L    Chloride 105 94 - 109 mmol/L    Carbon Dioxide 25 20 - 32 mmol/L    Anion Gap 8 3 - 14 mmol/L    Glucose 214 (H) 70 - 99 mg/dL    Urea Nitrogen 35 (H) 7 - 30 mg/dL    Creatinine 2.64 (H) 0.52 - 1.04 mg/dL    GFR Estimate 18 (L) >60 mL/min/1.7m2    GFR Estimate If Black 22 (L) >60 mL/min/1.7m2    Calcium 8.5 8.5 - 10.1 mg/dL   Glucose by meter   Result Value Ref Range    Glucose 233 (H) 70 - 99 mg/dL   Anti Treponema   Result Value Ref Range    Treponema pallidum Antibody Negative NEG   Glucose by meter   Result Value Ref Range    Glucose 204 (H) 70 - 99 mg/dL   25 Hydroxyvitamin D2 and D3   Result Value Ref Range    25 OH Vit D2 <5 ug/L    25 OH Vit D3 43 ug/L    25 OH Vit D total  20 - 75 ug/L     <48  Season, race, dietary intake, and treatment affect the concentration of   25-hydroxy-Vitamin D. Values may decrease during winter months and increase   during summer months. Values 20-29 ug/L may indicate Vitamin D insufficiency   and values <20 ug/L may indicate Vitamin D deficiency.   This test was developed and its performance characteristics determined by the   [facility],  Special Chemistry Laboratory. It has   not been cleared or approved by the FDA. The laboratory is regulated under CLIA   as qualified to perform high-complexity testing. This test is used for  clinical   purposes. It should not be regarded as investigational or for research.       *Note: Due to a large number of results and/or encounters for the requested time period, some results have not been displayed. A complete set of results can be found in Results Review.        ASSESSMENT/PLAN:                                                    ASSESSMENT / PLAN:  (N17.9) ROB (acute kidney injury) (H)  (primary encounter diagnosis)  Comment: Stable, at baseline  Plan: Basic metabolic panel  (Ca, Cl, CO2, Creat,         Gluc, K, Na, BUN), CBC with platelets and         differential, Hepatic panel (Albumin, ALT, AST,        Bili, Alk Phos, TP)        Repeat labs ordered for 3/22, we will explore antimicrobial cath or silver impregnated caths for prevention of UTI    (R41.0) Disorientation  Comment: improved at baseline  Plan: monitor, no need for neuropsych eval at this time    (E66.01) Morbid obesity, unspecified obesity type (H)  Comment: Continues to slowly lose   Plan: encourage appropriate eating and sleeping patterns, please try and move every day    (I10) Benign essential hypertension  Comment: improved, she feels her pressure was high in the hospital because they were taking it on her upper arm  Plan: monitor     (L89.159) Decubitus ulcer of sacral region, unspecified pressure ulcer stage  Comment: stable, closed  Plan: continue calmoseptine    (E11.42) Diabetic polyneuropathy associated with type 2 diabetes mellitus (H)  Comment: increased with drop in gabapentin,   Plan: gabapentin (NEURONTIN) 300 MG capsule        Will increase to 900mg 2x daily, take 2 tabs 2x daily for one week and then increase to 3 tabs 2x daily    (Z09) Hospital discharge follow-up  Comment: improved, changes made since discharge  Plan: increased gabapentin, maintain clonidine and trileptal, family will not be going to urology or neuropsych appt given ambulance transport is needed and they do not feel the appts are necessary at this  time    Patient Instructions   I have ordered you labs to be done on 3/22, our lab team will call you to set up an appt     We will increase your gabapentin back up to your prior hospital dose to help with your neuropathy, taper as instructed on the bottle     We will continue your other meds without change    We will explore anti-microbial impregnated catheters       Marivel Barcenas NP, APRN [facility]    Visit time 60   min with > than 50% of the time spent in counseling on: care coordination, hospital follow up, AKF, altered mental status, obesity, HTN,

## 2017-03-21 ENCOUNTER — OFFICE VISIT (OUTPATIENT)
Dept: PHARMACY | Facility: CLINIC | Age: 72
End: 2017-03-21
Payer: COMMERCIAL

## 2017-03-21 VITALS — SYSTOLIC BLOOD PRESSURE: 98 MMHG | HEART RATE: 74 BPM | OXYGEN SATURATION: 98 % | DIASTOLIC BLOOD PRESSURE: 58 MMHG

## 2017-03-21 DIAGNOSIS — E11.42 DIABETIC POLYNEUROPATHY ASSOCIATED WITH TYPE 2 DIABETES MELLITUS (H): ICD-10-CM

## 2017-03-21 DIAGNOSIS — M25.519 CHRONIC SHOULDER PAIN, UNSPECIFIED LATERALITY: ICD-10-CM

## 2017-03-21 DIAGNOSIS — G89.29 CHRONIC SHOULDER PAIN, UNSPECIFIED LATERALITY: ICD-10-CM

## 2017-03-21 DIAGNOSIS — E11.3299 DIABETES MELLITUS WITH BACKGROUND RETINOPATHY (H): Primary | ICD-10-CM

## 2017-03-21 PROCEDURE — 99607 MTMS BY PHARM ADDL 15 MIN: CPT | Performed by: PHARMACIST

## 2017-03-21 PROCEDURE — 99606 MTMS BY PHARM EST 15 MIN: CPT | Performed by: PHARMACIST

## 2017-03-21 NOTE — PROGRESS NOTES
SUBJECTIVE/OBJECTIVE:                                                    Gem Jade is a 71 year old female seen at their home for a transitions of care visit.  She was discharged from [facility] on 3/15/17 for UTI, ROB, AMS.     Chief Complaint: Follow up from our visit on 2/1/2017. Hospital f/u  Tobacco: History of tobacco dependence - quit   Alcohol: not currently using    Medication Adherence: issues found and discussed below and has assistance setting up med boxes (Keshawn)    neuropathy: currently taking gabapentin 600mg BID for the rest of this week, then will be back at 900mg BID (her usual dose). Hadn't been able to sleep because of leg pain and still bothering her a little even after dose increase. Still hasn't been sleeping well, about 3 hrs of sleep. Continues to take oxcarbazepine at her usual dose. Both of these medications had been prescribed by neurology and are effective. She does not think they are contributing to AMS when she becomes sick.   Diabetes:  Pt currently taking Lantus 14 units daily, Novolog 7 units with meals and sliding scale 2 units for every 50 over 201. Has been remembering to use Novolog (1-2 times a day) with meals most of the time, estimates about 60% of doses are injected.  SMBG: one to three times daily.   Ranges (based on glucometer readings): 200-500s  Symptoms of low blood sugar? none. Frequency of hypoglycemia? never.  Recent symptoms of high blood sugar? none  Pt is not taking an ACEi/ARB. (worsening renal function when on ACE/ARB therapy)  Aspirin: Taking 81mg daily and denies side effects  Diet/exercise: has been eating 1-2 times a day, appetite is normal. Thinks appetite is back to baseline prior to portion restriction. She is bed bound, no exercise.  Shoulder pain: currently taking tramadol twice a day which is working for shoulder pain. She would like to see if she can restart using PRN instead of scheduled.    Current labs include:  BP Readings from Last 3 Encounters:    03/17/17 130/60   03/15/17 134/59   03/01/17 120/58     Today's Vitals: BP 98/58  Pulse 74  SpO2 98%  Lab Results   Component Value Date    A1C 7.4 12/05/2016   .  Lab Results   Component Value Date    CHOL 164 12/05/2016     Lab Results   Component Value Date    TRIG 272 12/05/2016     Lab Results   Component Value Date    HDL 46 12/05/2016     Lab Results   Component Value Date    LDL 64 12/05/2016     Lab Results   Component Value Date    ALT <6 03/08/2017     Lab Results   Component Value Date    UCRR 42 03/18/2014    MICROL 68 06/07/2013    UMALCR 194.29 (H) 06/07/2013       Last Basic Metabolic Panel:  Lab Results   Component Value Date     03/15/2017      Lab Results   Component Value Date    POTASSIUM 3.9 03/15/2017     Lab Results   Component Value Date    CHLORIDE 107 03/15/2017     Lab Results   Component Value Date    BUN 34 03/15/2017     Lab Results   Component Value Date    CR 1.70 03/15/2017     GFR Estimate   Date Value Ref Range Status   03/15/2017 30 (L) >60 mL/min/1.7m2 Final     Comment:     Non  GFR Calc   03/14/2017 27 (L) >60 mL/min/1.7m2 Final     Comment:     Non  GFR Calc   03/13/2017 25 (L) >60 mL/min/1.7m2 Final     Comment:     Non  GFR Calc     GFR Estimate If Black   Date Value Ref Range Status   03/15/2017 36 (L) >60 mL/min/1.7m2 Final     Comment:      GFR Calc   03/14/2017 33 (L) >60 mL/min/1.7m2 Final     Comment:      GFR Calc   03/13/2017 30 (L) >60 mL/min/1.7m2 Final     Comment:      GFR Calc     TSH   Date Value Ref Range Status   03/09/2017 1.20 0.40 - 4.00 mU/L Final   ]    Most Recent Immunizations   Administered Date(s) Administered     Hepatitis B 01/13/2003     Influenza (H1N1) 11/25/2009     Influenza (High Dose) 3 valent vaccine 10/07/2016     Influenza (IIV3) 11/05/2011     Influenza Vaccine IM 3yrs+ 4 Valent IIV4 10/16/2013     Pneumococcal (PCV 13) 12/01/2015      Pneumococcal 23 valent 06/17/2013     TDAP (ADACEL AGES 11-64) 06/17/2013     Zoster vaccine, live 06/17/2013     ASSESSMENT:                                                       Current medications were reviewed today.      Medication Adherence: needs improvement - see below    neuropathy: improved. Continue with current medications. Unclear if polypharmacy is contributing to AMS but pt would prefer to work off oxcarbazepine instead of gabapentin. Will work on reducing opioids first as below, then consider dose decreases in oxcarbazepine at follow-up.   Diabetes: needs improvement. A1c at goal <8% however BS have increased over the last month. Pt would benefit from increasing Lantus from 14 to 18 units daily and continued work on adherence to Novolog.  Shoulder pain: stable. Agree with pt working off opioids to reduce CNS depressants; will decrease to once daily for the next week then try PRN after that    PLAN:                                                      Post Discharge Medication Reconciliation Status: discharge medications reconciled, continue medications without change.    Increase Lantus to 18 units daily  Continue to work on adherence to Novolog injections with meals  Decrease tramadol to 50mg QPM x1 week, then take PRN thereafter    I spent 60 minutes with this patient today.  All changes were made via collaborative practice agreement with Marivel Barcenas. A copy of the visit note was provided to the patient's primary care provider.    Will follow up in 1 month.    The patient was mailed a summary of these recommendations as an after visit summary.    Conchita Toledo, PharmD, BCACP

## 2017-03-21 NOTE — MR AVS SNAPSHOT
After Visit Summary   3/21/2017    Gem Jade    MRN: [id number]           Patient Information     Date Of Birth          1945        Visit Information        Provider Department      3/21/2017 2:00 PM Conchita Toledo, [facility] Integrated Primary Care MTM        Today's Diagnoses     Diabetes mellitus with background retinopathy    -  1    Chronic shoulder pain, unspecified laterality        Diabetic polyneuropathy associated with type 2 diabetes mellitus (H)          Care Instructions    Recommendations from today's MTM visit:                                                        1. Increase Lantus to 18 units once a day    2. Keep working on getting all your Novolog shots with meals    3. We decided to to going back to only taking tramadol as needed. We decreased tramadol in your pill box so you were only taking it once a day for a week, then we will stop setting it up in your pill box and you can just take it if you need it.    4. Glad you are feeling back to normal!    Next MTM visit: 1 month    To schedule another MTM appointment, please call the clinic directly or you may call the MTM scheduling line at [phone] or toll-free at [phone].     My Clinical Pharmacist's contact information:                                                      It was a pleasure seeing you today!  Please feel free to contact me with any questions or concerns you have.      Conchita Toledo, PharmD, BCACP     You may receive a survey about the MTM services you received.  I would appreciate your feedback to help me serve you better in the future. Please fill it out and return it when you can. Your comments will be anonymous.                Follow-ups after your visit        Your next 10 appointments already scheduled     Apr 05, 2017  2:00 PM CDT   Return Visit with ANTHONY Antonio [facility] ([facility])    [address]    [address] [place] [id number] [phone]            Apr 18, 2017  2:00 PM CDT   Office Visit with Conchita Toledo RPH   [facility] ([facility])    [address] [place] [address]  [address] [place] [id number] [phone]           Bring a current list of meds and any records pertaining to this visit.  For Physicals, please bring immunization records and any forms needing to be filled out.  Please arrive 10 minutes early to complete paperwork.            May 03, 2017  2:00 PM CDT   [facility] C[facility]    [address] [place] [address] [id number] [phone]            Jun 07, 2017  2:00 PM CDT   [facility] Visit with [facility] CNP   [facility]    [address] [place] [id number] [phone]              Who to contact     If you have questions or need follow up information about today's clinic visit or your schedule please contact [facility] directly at [phone].  Normal or non-critical lab and imaging results will be communicated to you by MyChart, letter or phone within 4 business days after the clinic has received the results. If you do not hear from us within 7 days, please contact the clinic through MyChart or phone. If you have a critical or abnormal lab result, we will notify you by phone as soon as possible.  Submit refill requests through [employer] or call your pharmacy and they will forward the refill request to us. Please allow 3 business days for your refill to be completed.          Additional Information About Your Visit        [employer]flow Information     [employer] lets you send messages to your doctor, view your test results, renew your prescriptions, schedule appointments and more. To sign up, go to  [email or web address] . Click on \"Log in\" on the left side of the screen, which will take you to the Welcome page. Then click on \"Sign up Now\" on the right side of the page.     You will be asked to enter the access code listed below, as well as some personal information. Please follow the directions to create your username and password.     Your access code is: [email or web address]  Expires: 2017  9:50 AM     Your access code will  in 90 days. If you need help or a new code, please call your [facility] clinic or [phone].        Care EveryWhere ID     This is your Care EveryWhere ID. This could be used by other organizations to access your [facility] medical records  [id number]        Your Vitals Were     Pulse Pulse Oximetry                74 98%           Blood Pressure from Last 3 Encounters:   17 98/58   17 130/60   03/15/17 134/59    Weight from Last 3 Encounters:   03/15/17 201 lb (91.2 kg)   17 205 lb 4.8 oz (93.1 kg)   16 201 lb 8 oz (91.4 kg)              Today, you had the following     No orders found for display         Today's Medication Changes          These changes are accurate as of: 3/21/17 11:59 PM.  If you have any questions, ask your nurse or doctor.               These medicines have changed or have updated prescriptions.        Dose/Directions    LANTUS SOLOSTAR 100 UNIT/ML injection   This may have changed:  additional instructions   Used for:  Diabetes mellitus with background retinopathy (H)   Generic drug:  insulin glargine        Inject 18 units under the skin daily.   Quantity:  15 mL   Refills:  3       TraMADol HCl 50 MG Tbdp   Indication:  Moderate to Moderately Severe Pain   This may have changed:  when to take this   Used for:  Chronic shoulder pain, unspecified laterality        Dose:  50 mg   Take 50 mg by mouth 3 times daily as needed   Quantity:  90 tablet   Refills:  3                Primary Care Provider Office Phone # Fax #    Marivel Salgado " KerriKeilyJerome ANTHONY HANKINS [phone]       [facility] [address] [facility] [address]  [facility] [address]        Thank you!     Thank you for choosing [facility]  for your care. Our goal is always to provide you with excellent care. Hearing back from our patients is one way we can continue to improve our services. Please take a few minutes to complete the written survey that you may receive in the mail after your visit with us. Thank you!             Your Updated Medication List - Protect others around you: Learn how to safely use, store and throw away your medicines at www.disposemymeds.org.          This list is accurate as of: 3/21/17 11:59 PM.  Always use your most recent med list.                   Brand Name Dispense Instructions for use    * ACE NOT PRESCRIBED (INTENTIONAL)      1 each continuous prn ACE Inhibitor not prescribed due to Worsening renal function on ACE/ARB therapy       ACETAMINOPHEN PO      Take 325-650 mg by mouth every 4 hours as needed.       alcohol swab prep pads     1 Box    Use to swab area of injection/yvon and vials as directed.       amoxicillin 500 MG capsule    AMOXIL    14 capsule    Take 1 capsule (500 mg) by mouth every 12 hours for 7 days       ASPIRIN PO      Take 81 mg by mouth daily.       blood glucose monitoring test strip    ACCU-CHEK COMPACT PLUS    100 strip    Use to test blood sugar 3 times daily or as directed.       cloNIDine 0.1 MG tablet    CATAPRES    180 tablet    Take 1 tablet (0.1 mg) by mouth 2 times daily       clotrimazole 1 % cream    LOTRIMIN    40 g    Apply topically 2 times daily Apply to buttocks twice a day until rash resolved.       Cranberry 400 MG Tabs      Take 400 mg by mouth 2 times daily       cyanocobalamin 1000 MCG/ML injection    VITAMIN B12    3 mL    Inject 1 mL (1,000 mcg) into the muscle every 30 days       diphenoxylate-atropine 2.5-0.025 MG per tablet    LOMOTIL    50 tablet     Take 1 tablet by mouth 4 times daily as needed for diarrhea       famotidine 20 MG tablet    PEPCID    60 tablet    Take 1 tablet (20 mg) by mouth 2 times daily       fluconazole 200 MG tablet    DIFLUCAN    7 tablet    Take 1 tablet (200 mg) by mouth daily for 7 days       gabapentin 300 MG capsule    NEURONTIN    180 capsule    Take 2 tabs 2x day for one week then increase to 3 tabs 2x daily       GLUCAGON EMERGENCY 1 MG kit   Generic drug:  glucagon     1 each    Inject 1 mg into the muscle once       insulin pen needle 30G X 8 MM     100 each    Use as directed with Lantus       labetalol 200 MG tablet    NORMODYNE    180 tablet    Take 1 tablet (200 mg) by mouth 2 times daily       lactobacillus rhamnosus (GG) capsule     60 capsule    Take 1 capsule by mouth 2 times daily       LANTUS SOLOSTAR 100 UNIT/ML injection   Generic drug:  insulin glargine     15 mL    Inject 18 units under the skin daily.       levothyroxine 50 MCG tablet    SYNTHROID    90 tablet    Take 1 tablet (50 mcg) by mouth daily       loratadine 10 MG tablet    CLARITIN    90 tablet    Take 1 tablet (10 mg) by mouth daily       meclizine 12.5 MG tablet    ANTIVERT    90 tablet    Take 1 tablet (12.5 mg) by mouth 3 times daily       NYSTOP 618892 UNIT/GM Powd powder     60 g    Externally apply 1 g topically 3 times daily as needed       ondansetron 4 MG ODT tab    ZOFRAN ODT    20 tablet    Take 1-2 tablets (4-8 mg) by mouth every 8 hours as needed for nausea       * order for DME     3 catheter    Equipment being ordered: Johnson Catheters - 18 french       * order for DME     100 pad    Equipment being ordered: Blue Chux [facility]       * order for DME     1 each    Equipment being ordered: Rico FX CPAP interface (nasal pillows), size XS.       OXcarbazepine 300 MG tablet    TRILEPTAL    180 tablet    Take 1 tablet (300 mg) by mouth 2 times daily       phenazopyridine 100 MG tablet    PYRIDIUM    6 tablet    Take 1 tablet  "(100 mg) by mouth 3 times daily as needed for irritation       pravastatin 10 MG tablet    PRAVACHOL    90 tablet    Take 1 tablet (10 mg) by mouth daily       predniSONE 2.5 MG tablet    DELTASONE    30 tablet    Take 1 tablet (2.5 mg) by mouth daily       simethicone 125 MG Chew chewable tablet    MYLICON    120 tablet    Take 1 tablet (125 mg) by mouth as needed for intestinal gas       syringe/needle (disp) 22G X 1-1/2\" 3 ML Misc    B-D SYRINGE/NEEDLE    50 each    Inject 1 Syringe into the muscle every 30 days       tacrolimus 1 MG capsule    PROGRAF - GENERIC EQUIVALENT    120 capsule    Take 1 capsule (1 mg) by mouth 2 times daily       TraMADol HCl 50 MG Tbdp     90 tablet    Take 50 mg by mouth 3 times daily as needed       * Notice:  This list has 4 medication(s) that are the same as other medications prescribed for you. Read the directions carefully, and ask your doctor or other care provider to review them with you.      "

## 2017-03-22 DIAGNOSIS — N17.9 AKI (ACUTE KIDNEY INJURY) (H): ICD-10-CM

## 2017-03-22 LAB
BASOPHILS # BLD AUTO: 0 10E9/L (ref 0–0.2)
BASOPHILS NFR BLD AUTO: 0.2 %
DIFFERENTIAL METHOD BLD: ABNORMAL
EOSINOPHIL # BLD AUTO: 0.2 10E9/L (ref 0–0.7)
EOSINOPHIL NFR BLD AUTO: 3.8 %
ERYTHROCYTE [DISTWIDTH] IN BLOOD BY AUTOMATED COUNT: 14.9 % (ref 10–15)
HCT VFR BLD AUTO: 29.8 % (ref 35–47)
HGB BLD-MCNC: 9.3 G/DL (ref 11.7–15.7)
LYMPHOCYTES # BLD AUTO: 1.3 10E9/L (ref 0.8–5.3)
LYMPHOCYTES NFR BLD AUTO: 26.6 %
MCH RBC QN AUTO: 28.9 PG (ref 26.5–33)
MCHC RBC AUTO-ENTMCNC: 31.2 G/DL (ref 31.5–36.5)
MCV RBC AUTO: 93 FL (ref 78–100)
MONOCYTES # BLD AUTO: 0.4 10E9/L (ref 0–1.3)
MONOCYTES NFR BLD AUTO: 9.1 %
NEUTROPHILS # BLD AUTO: 2.9 10E9/L (ref 1.6–8.3)
NEUTROPHILS NFR BLD AUTO: 60.3 %
PLATELET # BLD AUTO: 124 10E9/L (ref 150–450)
RBC # BLD AUTO: 3.22 10E12/L (ref 3.8–5.2)
WBC # BLD AUTO: 4.7 10E9/L (ref 4–11)

## 2017-03-22 PROCEDURE — 80076 HEPATIC FUNCTION PANEL: CPT | Performed by: FAMILY MEDICINE

## 2017-03-22 PROCEDURE — 85025 COMPLETE CBC W/AUTO DIFF WBC: CPT | Performed by: FAMILY MEDICINE

## 2017-03-22 PROCEDURE — 36415 COLL VENOUS BLD VENIPUNCTURE: CPT | Performed by: FAMILY MEDICINE

## 2017-03-22 PROCEDURE — 80048 BASIC METABOLIC PNL TOTAL CA: CPT | Performed by: FAMILY MEDICINE

## 2017-03-23 DIAGNOSIS — Z94.0 KIDNEY TRANSPLANTED: Primary | ICD-10-CM

## 2017-03-23 DIAGNOSIS — Z79.60 LONG-TERM USE OF IMMUNOSUPPRESSANT MEDICATION: ICD-10-CM

## 2017-03-23 DIAGNOSIS — Z94.0 KIDNEY TRANSPLANT RECIPIENT: ICD-10-CM

## 2017-03-23 DIAGNOSIS — D84.9 IMMUNOSUPPRESSED STATUS (H): ICD-10-CM

## 2017-03-23 LAB
ALBUMIN SERPL-MCNC: 3 G/DL (ref 3.4–5)
ALP SERPL-CCNC: 77 U/L (ref 40–150)
ALT SERPL W P-5'-P-CCNC: 29 U/L (ref 0–50)
ANION GAP SERPL CALCULATED.3IONS-SCNC: 8 MMOL/L (ref 3–14)
AST SERPL W P-5'-P-CCNC: 25 U/L (ref 0–45)
BILIRUB DIRECT SERPL-MCNC: <0.1 MG/DL (ref 0–0.2)
BILIRUB SERPL-MCNC: 0.1 MG/DL (ref 0.2–1.3)
BUN SERPL-MCNC: 50 MG/DL (ref 7–30)
CALCIUM SERPL-MCNC: 9.2 MG/DL (ref 8.5–10.1)
CHLORIDE SERPL-SCNC: 102 MMOL/L (ref 94–109)
CO2 SERPL-SCNC: 23 MMOL/L (ref 20–32)
CREAT SERPL-MCNC: 1.87 MG/DL (ref 0.52–1.04)
GFR SERPL CREATININE-BSD FRML MDRD: 27 ML/MIN/1.7M2
GLUCOSE SERPL-MCNC: 188 MG/DL (ref 70–99)
POTASSIUM SERPL-SCNC: 4.5 MMOL/L (ref 3.4–5.3)
PROT SERPL-MCNC: 7.6 G/DL (ref 6.8–8.8)
SODIUM SERPL-SCNC: 133 MMOL/L (ref 133–144)

## 2017-03-23 RX ORDER — SIROLIMUS 0.5 MG/1
1.5 TABLET, FILM COATED ORAL DAILY
Qty: 90 TABLET | Refills: 11 | Status: ON HOLD | OUTPATIENT
Start: 2017-03-23 | End: 2017-04-28

## 2017-03-23 NOTE — TELEPHONE ENCOUNTER
Drug Name: sirolimus 0.5mg  Last Fill Date: 2/13/17  Quantity: 30    Rhondaerin Armenta   [facility]  [phone]

## 2017-03-24 DIAGNOSIS — E11.3299 DIABETES MELLITUS WITH BACKGROUND RETINOPATHY (H): ICD-10-CM

## 2017-03-27 NOTE — PATIENT INSTRUCTIONS
Recommendations from today's MTM visit:                                                        1. Increase Lantus to 18 units once a day    2. Keep working on getting all your Novolog shots with meals    3. We decided to to going back to only taking tramadol as needed. We decreased tramadol in your pill box so you were only taking it once a day for a week, then we will stop setting it up in your pill box and you can just take it if you need it.    4. Glad you are feeling back to normal!    Next MTM visit: 1 month    To schedule another MTM appointment, please call the clinic directly or you may call the MTM scheduling line at [phone] or toll-free at [phone].     My Clinical Pharmacist's contact information:                                                      It was a pleasure seeing you today!  Please feel free to contact me with any questions or concerns you have.      Conchita Toledo, PharmD, BCACP     You may receive a survey about the MTM services you received.  I would appreciate your feedback to help me serve you better in the future. Please fill it out and return it when you can. Your comments will be anonymous.

## 2017-04-03 DIAGNOSIS — Z79.60 LONG-TERM USE OF IMMUNOSUPPRESSANT MEDICATION: ICD-10-CM

## 2017-04-03 DIAGNOSIS — D84.9 IMMUNOSUPPRESSED STATUS (H): ICD-10-CM

## 2017-04-03 DIAGNOSIS — Z94.0 KIDNEY TRANSPLANT RECIPIENT: ICD-10-CM

## 2017-04-03 LAB
ANION GAP SERPL CALCULATED.3IONS-SCNC: 8 MMOL/L (ref 3–14)
BUN SERPL-MCNC: 45 MG/DL (ref 7–30)
CALCIUM SERPL-MCNC: 9.2 MG/DL (ref 8.5–10.1)
CHLORIDE SERPL-SCNC: 105 MMOL/L (ref 94–109)
CO2 SERPL-SCNC: 23 MMOL/L (ref 20–32)
CREAT SERPL-MCNC: 1.37 MG/DL (ref 0.52–1.04)
ERYTHROCYTE [DISTWIDTH] IN BLOOD BY AUTOMATED COUNT: 14.5 % (ref 10–15)
GFR SERPL CREATININE-BSD FRML MDRD: 38 ML/MIN/1.7M2
GLUCOSE SERPL-MCNC: 143 MG/DL (ref 70–99)
HCT VFR BLD AUTO: 29.8 % (ref 35–47)
HGB BLD-MCNC: 9.3 G/DL (ref 11.7–15.7)
MCH RBC QN AUTO: 28.9 PG (ref 26.5–33)
MCHC RBC AUTO-ENTMCNC: 31.2 G/DL (ref 31.5–36.5)
MCV RBC AUTO: 93 FL (ref 78–100)
PLATELET # BLD AUTO: 143 10E9/L (ref 150–450)
POTASSIUM SERPL-SCNC: 4.1 MMOL/L (ref 3.4–5.3)
RBC # BLD AUTO: 3.22 10E12/L (ref 3.8–5.2)
SODIUM SERPL-SCNC: 136 MMOL/L (ref 133–144)
WBC # BLD AUTO: 4.3 10E9/L (ref 4–11)

## 2017-04-03 PROCEDURE — 80197 ASSAY OF TACROLIMUS: CPT | Performed by: INTERNAL MEDICINE

## 2017-04-03 PROCEDURE — 85027 COMPLETE CBC AUTOMATED: CPT | Performed by: INTERNAL MEDICINE

## 2017-04-03 PROCEDURE — 80195 ASSAY OF SIROLIMUS: CPT | Performed by: INTERNAL MEDICINE

## 2017-04-03 PROCEDURE — 36415 COLL VENOUS BLD VENIPUNCTURE: CPT | Performed by: INTERNAL MEDICINE

## 2017-04-03 PROCEDURE — 80048 BASIC METABOLIC PNL TOTAL CA: CPT | Performed by: INTERNAL MEDICINE

## 2017-04-04 DIAGNOSIS — D50.9 IRON DEFICIENCY ANEMIA, UNSPECIFIED IRON DEFICIENCY ANEMIA TYPE: ICD-10-CM

## 2017-04-04 LAB
SIROLIMUS BLD-MCNC: 6.9 UG/L (ref 5–15)
TACROLIMUS BLD-MCNC: 3.7 UG/L (ref 5–15)
TME LAST DOSE: ABNORMAL H
TME LAST DOSE: NORMAL H

## 2017-04-04 NOTE — TELEPHONE ENCOUNTER
Sandra from [facility] called:    HC can give the B12 shot (given monthly), but family is not sure when it was last given.  Sandra is wondering if we know when it was last given?    Need refill as there is no B12 left in the house or any refills--writer pended prescription    Sandra can be reached at [phone]

## 2017-04-04 NOTE — TELEPHONE ENCOUNTER
B12 script cued by previous writer.      Routing to provider to see if homecare can begin giving monthly injections to patient.    Wendie aHrris RN

## 2017-04-05 ENCOUNTER — OFFICE VISIT (OUTPATIENT)
Dept: FAMILY MEDICINE | Facility: CLINIC | Age: 72
End: 2017-04-05
Payer: COMMERCIAL

## 2017-04-05 VITALS — DIASTOLIC BLOOD PRESSURE: 60 MMHG | SYSTOLIC BLOOD PRESSURE: 110 MMHG | OXYGEN SATURATION: 100 % | HEART RATE: 62 BPM

## 2017-04-05 DIAGNOSIS — N39.0 RECURRENT UTI: ICD-10-CM

## 2017-04-05 DIAGNOSIS — E11.42 DIABETIC POLYNEUROPATHY ASSOCIATED WITH TYPE 2 DIABETES MELLITUS (H): ICD-10-CM

## 2017-04-05 DIAGNOSIS — E53.8 VITAMIN B12 DEFICIENCY (NON ANEMIC): ICD-10-CM

## 2017-04-05 DIAGNOSIS — Z94.0 KIDNEY REPLACED BY TRANSPLANT: Primary | ICD-10-CM

## 2017-04-05 DIAGNOSIS — G89.29 CHRONIC SHOULDER PAIN, UNSPECIFIED LATERALITY: Primary | ICD-10-CM

## 2017-04-05 DIAGNOSIS — E66.01 MORBID OBESITY, UNSPECIFIED OBESITY TYPE (H): ICD-10-CM

## 2017-04-05 DIAGNOSIS — Z94.0 KIDNEY REPLACED BY TRANSPLANT: ICD-10-CM

## 2017-04-05 DIAGNOSIS — M25.519 CHRONIC SHOULDER PAIN, UNSPECIFIED LATERALITY: Primary | ICD-10-CM

## 2017-04-05 DIAGNOSIS — I10 BENIGN ESSENTIAL HYPERTENSION: ICD-10-CM

## 2017-04-05 PROCEDURE — 99350 HOME/RES VST EST HIGH MDM 60: CPT | Performed by: NURSE PRACTITIONER

## 2017-04-05 RX ORDER — PREDNISONE 2.5 MG/1
2.5 TABLET ORAL DAILY
Qty: 30 TABLET | Refills: 11 | Status: ON HOLD | OUTPATIENT
Start: 2017-04-05 | End: 2017-04-28

## 2017-04-05 RX ORDER — CYANOCOBALAMIN 1000 UG/ML
1000 INJECTION, SOLUTION INTRAMUSCULAR; SUBCUTANEOUS
Qty: 3 ML | Refills: 3 | Status: SHIPPED | OUTPATIENT
Start: 2017-04-05 | End: 2018-09-13

## 2017-04-05 NOTE — PATIENT INSTRUCTIONS
We have ordered silver impregnated caths to be used at cath changes to help with UTI's     We will call your [facility] nurse to let her know about the new cath    Please continue to work on your diet     We will see 5/3 at 2pm

## 2017-04-05 NOTE — MR AVS SNAPSHOT
After Visit Summary   4/5/2017    Gem Jade    MRN: [id number]           Patient Information     Date Of Birth          1945        Visit Information        Provider Department      4/5/2017 2:00 PM Marivel Barcenas APRN CNP [facility]        Today's Diagnoses     Chronic shoulder pain, unspecified laterality    -  1    Morbid obesity, unspecified obesity type (H)        Diabetic polyneuropathy associated with type 2 diabetes mellitus (H)        Kidney replaced by transplant        Benign essential hypertension        Recurrent UTI        Vitamin B12 deficiency (non anemic)          Care Instructions    We have ordered silver impregnated caths to be used at cath changes to help with UTI's     We will call your [facility] nurse to let her know about the new cath    Please continue to work on your diet     We will see 5/3 at 2pm            Follow-ups after your visit        Follow-up notes from your care team     Return in about 4 weeks (around 5/3/2017).      Your next 10 appointments already scheduled     Apr 18, 2017  2:00 PM CDT   Office Visit with Conchita Toledo, [facility] ([facility])    [address] [place], [place] [id number] [phone]           Bring a current list of meds and any records pertaining to this visit.  For Physicals, please bring immunization records and any forms needing to be filled out.  Please arrive 10 minutes early to complete paperwork.            May 03, 2017  2:00 PM CDT   Return Visit with ANTHONY Antonio CNP   [facility] ([facility])    [address] [place], [place] [address] [phone]            Jun 07, 2017  2:00 PM CDT   Return Visit with ANTHONY Antonio CNP   [facility] ([facility])    [address] [place] [address]  "[address]  [facility] [address]   [phone]              Who to contact     If you have questions or need follow up information about today's clinic visit or your schedule please contact [facility] directly at [phone].  Normal or non-critical lab and imaging results will be communicated to you by MyChart, letter or phone within 4 business days after the clinic has received the results. If you do not hear from us within 7 days, please contact the clinic through Xangahart or phone. If you have a critical or abnormal lab result, we will notify you by phone as soon as possible.  Submit refill requests through Kamibu or call your pharmacy and they will forward the refill request to us. Please allow 3 business days for your refill to be completed.          Additional Information About Your Visit        XangaharFlowbox Information     Kamibu lets you send messages to your doctor, view your test results, renew your prescriptions, schedule appointments and more. To sign up, go to [email or web address] . Click on \"Log in\" on the left side of the screen, which will take you to the Welcome page. Then click on \"Sign up Now\" on the right side of the page.     You will be asked to enter the access code listed below, as well as some personal information. Please follow the directions to create your username and password.     Your access code is: [email or web address]  Expires: 2017  9:50 AM     Your access code will  in 90 days. If you need help or a new code, please call your [place] clinic or [phone].        Care EveryWhere ID     This is your Care EveryWhere ID. This could be used by other organizations to access your [place] medical records  [id number]        Your Vitals Were     Pulse Pulse Oximetry                62 100%           Blood Pressure from Last 3 Encounters:   17 110/60   17 98/58   17 130/60    Weight from Last 3 Encounters:   03/15/17 201 lb (91.2 kg)   17 205 " lb 4.8 oz (93.1 kg)   09/03/16 201 lb 8 oz (91.4 kg)              Today, you had the following     No orders found for display         Today's Medication Changes          These changes are accurate as of: 4/5/17 11:59 PM.  If you have any questions, ask your nurse or doctor.               These medicines have changed or have updated prescriptions.        Dose/Directions    * order for DME   This may have changed:  Another medication with the same name was added. Make sure you understand how and when to take each.   Used for:  Fecal incontinence   Changed by:  Marivel Barcenas APRN CNP        Equipment being ordered: Blue Chux Please send to [facility]   Quantity:  100 pad   Refills:  3       * order for DME   This may have changed:  Another medication with the same name was added. Make sure you understand how and when to take each.   Used for:  Obstructive sleep apnea syndrome   Changed by:  Lorenzo Choudhury MD        Equipment being ordered: Parachute FX CPAP interface (nasal pillows), size XS.   Quantity:  1 each   Refills:  1       * order for DME   This may have changed:  You were already taking a medication with the same name, and this prescription was added. Make sure you understand how and when to take each.   Used for:  Recurrent UTI   Changed by:  Marivel Barcenas APRN CNP        Equipment being ordered: Silver Impregnated catheters HX of frequent UTI   Quantity:  12 each   Refills:  1       TraMADol HCl 50 MG Tbdp   Indication:  Moderate to Moderately Severe Pain   This may have changed:  when to take this   Used for:  Chronic shoulder pain, unspecified laterality        Dose:  50 mg   Take 50 mg by mouth 3 times daily as needed   Quantity:  90 tablet   Refills:  3       * Notice:  This list has 3 medication(s) that are the same as other medications prescribed for you. Read the directions carefully, and ask your doctor or other care provider to review them with you.         Where  to get your medicines      These medications were sent to [facility] - [place], [place] - [address]  [address] [facility], [facility] [address]    Hours:  Mon-Fri 8:30am-5:00pm Toll Free [phone] Phone:  [phone]     predniSONE 2.5 MG tablet         Some of these will need a paper prescription and others can be bought over the counter.  Ask your nurse if you have questions.     Bring a paper prescription for each of these medications     order for DME                Primary Care Provider Office Phone # Fax #    Marivel Salgado ANTHONY Barcenas [phone]       [facility] [address]  [facility] [address]        Thank you!     Thank you for choosing [facility]  for your care. Our goal is always to provide you with excellent care. Hearing back from our patients is one way we can continue to improve our services. Please take a few minutes to complete the written survey that you may receive in the mail after your visit with us. Thank you!             Your Updated Medication List - Protect others around you: Learn how to safely use, store and throw away your medicines at www.disposemymeds.org.          This list is accurate as of: 4/5/17 11:59 PM.  Always use your most recent med list.                   Brand Name Dispense Instructions for use    * ACE NOT PRESCRIBED (INTENTIONAL)      1 each continuous prn ACE Inhibitor not prescribed due to Worsening renal function on ACE/ARB therapy       ACETAMINOPHEN PO      Take 325-650 mg by mouth every 4 hours as needed.       alcohol swab prep pads     1 Box    Use to swab area of injection/yvon and vials as directed.       ASPIRIN PO      Take 81 mg by mouth daily.       blood glucose monitoring test strip    ACCU-CHEK COMPACT PLUS    100 strip    Use to test blood sugar 3 times daily or as directed.       cloNIDine 0.1 MG tablet    CATAPRES    180 tablet    Take 1 tablet  (0.1 mg) by mouth 2 times daily       clotrimazole 1 % cream    LOTRIMIN    40 g    Apply topically 2 times daily Apply to buttocks twice a day until rash resolved.       Cranberry 400 MG Tabs      Take 400 mg by mouth 2 times daily       cyanocobalamin 1000 MCG/ML injection    VITAMIN B12    3 mL    Inject 1 mL (1,000 mcg) into the muscle every 30 days       diphenoxylate-atropine 2.5-0.025 MG per tablet    LOMOTIL    50 tablet    Take 1 tablet by mouth 4 times daily as needed for diarrhea       famotidine 20 MG tablet    PEPCID    60 tablet    Take 1 tablet (20 mg) by mouth 2 times daily       gabapentin 300 MG capsule    NEURONTIN    180 capsule    Take 2 tabs 2x day for one week then increase to 3 tabs 2x daily       GLUCAGON EMERGENCY 1 MG kit   Generic drug:  glucagon     1 each    Inject 1 mg into the muscle once       insulin aspart 100 UNIT/ML injection    NovoLOG FLEXPEN    15 mL    Inject 7 units before meals BID. At bedtime use following sliding scale:  150-200 5u 201-250 7u 251-300 9u 301-350 11u 351+ 13u       insulin pen needle 30G X 8 MM     100 each    Use as directed with Lantus       labetalol 200 MG tablet    NORMODYNE    180 tablet    Take 1 tablet (200 mg) by mouth 2 times daily       lactobacillus rhamnosus (GG) capsule     60 capsule    Take 1 capsule by mouth 2 times daily       LANTUS SOLOSTAR 100 UNIT/ML injection   Generic drug:  insulin glargine     15 mL    Inject 18 units under the skin daily.       levothyroxine 50 MCG tablet    SYNTHROID    90 tablet    Take 1 tablet (50 mcg) by mouth daily       loratadine 10 MG tablet    CLARITIN    90 tablet    Take 1 tablet (10 mg) by mouth daily       meclizine 12.5 MG tablet    ANTIVERT    90 tablet    Take 1 tablet (12.5 mg) by mouth 3 times daily       NYSTOP 826945 UNIT/GM Powd powder     60 g    Externally apply 1 g topically 3 times daily as needed       ondansetron 4 MG ODT tab    ZOFRAN ODT    20 tablet    Take 1-2 tablets (4-8 mg) by  "mouth every 8 hours as needed for nausea       * order for DME     3 catheter    Equipment being ordered: [employer] Catheters - 18 french       * order for DME     100 pad    Equipment being ordered: Blue Chux Please send to [facility]       * order for DME     1 each    Equipment being ordered: Ahuja FX CPAP interface (nasal pillows), size XS.       * order for DME     12 each    Equipment being ordered: Silver Impregnated catheters HX of frequent UTI       OXcarbazepine 300 MG tablet    TRILEPTAL    180 tablet    Take 1 tablet (300 mg) by mouth 2 times daily       phenazopyridine 100 MG tablet    PYRIDIUM    6 tablet    Take 1 tablet (100 mg) by mouth 3 times daily as needed for irritation       pravastatin 10 MG tablet    PRAVACHOL    90 tablet    Take 1 tablet (10 mg) by mouth daily       predniSONE 2.5 MG tablet    DELTASONE    30 tablet    Take 1 tablet (2.5 mg) by mouth daily       simethicone 125 MG Chew chewable tablet    MYLICON    120 tablet    Take 1 tablet (125 mg) by mouth as needed for intestinal gas       sirolimus 0.5 MG tablet    RAPAMUNE - GENERIC EQUIVALENT    90 tablet    Take 3 tablets (1.5 mg) by mouth daily       syringe/needle (disp) 22G X 1-1/2\" 3 ML Misc    B-D SYRINGE/NEEDLE    50 each    Inject 1 Syringe into the muscle every 30 days       tacrolimus 1 MG capsule    PROGRAF - GENERIC EQUIVALENT    120 capsule    Take 1 capsule (1 mg) by mouth 2 times daily       TraMADol HCl 50 MG Tbdp     90 tablet    Take 50 mg by mouth 3 times daily as needed       * Notice:  This list has 5 medication(s) that are the same as other medications prescribed for you. Read the directions carefully, and ask your doctor or other care provider to review them with you.      "

## 2017-04-05 NOTE — Clinical Note
I have ordered silver impregnated cath, please send to [facility] or [facility]  Please call her [facility] nurse and let her know that I gave her her B12 injection today, please also let them know that I have ordered her silver impregnated caths that they should use with her next cath change

## 2017-04-05 NOTE — TELEPHONE ENCOUNTER
Drug name: prednisone 2.5mg  Last fill: 02/13/17  Last quantity: 30    Shayna Echavarria  [facility]

## 2017-04-05 NOTE — TELEPHONE ENCOUNTER
Klaudia is seeing pt today, 4/5, so she can address this at her visit.    Madeline Flores RN

## 2017-04-05 NOTE — PROGRESS NOTES
SUBJECTIVE:                                                    Gem Jade is a 68 year old female who is seen in her home due to inability to leave the home unassisted, morbid obesity, severe vertigo, generalized weakness, chronic pain, bed bound, rarely able to get up in the , seen today for the following health issues:     PHARMD VISIT 3/21/17:    Current medications were reviewed today.      Medication Adherence: needs improvement - see below     neuropathy: improved. Continue with current medications. Unclear if polypharmacy is contributing to AMS but pt would prefer to work off oxcarbazepine instead of gabapentin. Will work on reducing opioids first as below, then consider dose decreases in oxcarbazepine at follow-up.   Diabetes: needs improvement. A1c at goal <8% however BS have increased over the last month. Pt would benefit from increasing Lantus from 14 to 18 units daily and continued work on adherence to Novolog.  Shoulder pain: stable. Agree with pt working off opioids to reduce CNS depressants; will decrease to once daily for the next week then try PRN after that     PLAN:         Post Discharge Medication Reconciliation Status: discharge medications reconciled, continue medications without change.     Increase Lantus to 18 units daily  Continue to work on adherence to Novolog injections with meals  Decrease tramadol to 50mg QPM x1 week, then take PRN thereafter     I spent 60 minutes with this patient today. All changes were made via collaborative practice agreement with Marivel Barcenas. A copy of the visit note was provided to the patient's primary care provider.     Will follow up in 1 month.     Conchita Toledo, VivianD, BCACP     KIDNEY TRANSPLANT      Duration: right kidney replaced, left one non functional     Description (location/character/radiation): chronic managed by kidney transplant team     Intensity:  moderate    Accompanying signs and symptoms: fatigue,   History  (similar episodes/previous evaluation):  Recent blood draw, 4/3, kidney function improving and Tacrolimus slightly low but Sirolimus now WNL, reviewed by transplant team, no med changes at this time will continue to monitor     Precipitating or alleviating factors: DM, bed bound     Therapies tried and outcome: see transplant team notes, no med changes          Diabetes Follow-up    Patient is checking blood sugars: once daily.  Results are as follows:     Diabetic concerns: frequent infections and other -  elevated sugars, HgbA1c increased to 7.4 12/16,     Sugars: AM-266, 389, 229,     Sugars: PM-244,335,267,130,314,241,     Recent increase to 18U of Lantus, taking Novolog 2x daily    Trying to eat 2x daily, still not getting up until noon, naps after dinner, then up until 4am      Still eating a lot of Rice,        Symptoms of hypoglycemia (low blood sugar): shaky, weak, lethargy, confusion, Symptoms occur if sugars < below 50.     Paresthesias (numbness or burning in feet) or sores: Yes hands and feet, using gabapentin with good relief now back up to gabapentin 900mg 2x daily     Date of last diabetic eye exam: unsure               Amount of exercise or physical activity: None, bed bound, no longer getting up in her wheel chair, planning on getting up in the chair today worked with PT on core strengthening and turning without using arms, able to turn herself, shoulder pain off and on which limits her ability to turn herself when the pain is bad      Problems taking medications regularly: No,  set them up and adm them    Medication side effects: none  Diet: diabetic, appetite has improved, still eating a fair amount of simple carbs, following renal diet       Musculoskeletal problem/pain      Duration: Chronic, but stable     Description  Location: shoulders, left the worse     Intensity:  Moderate,2-7 /10    Accompanying signs and symptoms: numbness and weakness in UE    History  Previous similar problem:  YES  Previous evaluation:  x-ray, MRI and orthopedic evaluation  RIGHT SHOULDER THREE VIEWS 5/19/2013 2:23 PM  HISTORY: Rule out fracture.  COMPARISON: 9/14/2012.  FINDINGS: Again seen are degenerative changes of the  acromioclavicular joint. The right shoulder is otherwise unremarkable  and unchanged.    Precipitating or alleviating factors:  Trauma or overuse: YES- uses her arms to turn herself in bed  Aggravating factors include: any use of UE  Therapies tried and outcome: using acetaminophen, using icy hot gel and tramadol rarely, nothing since 3/21, working on core strength, still using chinese patches and working well            Hypertension Follow-up      Outpatient blood pressures are being checked by [facility], stable    Low Salt Diet: not monitoring salt     Rash  Duration of complaint: skin lesions on face, itchy, no white heads or black heads, scabs on face     UTI - Female  Duration of complaint:  leaking around the cath is gone now, will be trying a silver impregnated cath to help prevent UTI        Problem list and histories reviewed & adjusted, as indicated.  Additional history: as documented    Recent Labs   Lab Test  04/03/17   1200  03/22/17   1130   03/09/17   1310   03/08/17   1243   02/16/17 2007 12/05/16   1210   08/30/16   0032   06/13/16   1140   12/20/15   0853   02/05/15   1250   05/16/13   1240   A1C   --    --    --    --    --    --    --    --    --   7.4*   --    --    --   6.7*   --   5.5   < >  6.9*   < >  7.2*   LDL   --    --    --    --    --    --    --    --    --   64   --    --    --    --    --    --    --   18   --   149*   HDL   --    --    --    --    --    --    --    --    --   46*   --    --    --    --    --    --    --   43*   --   42*   TRIG   --    --    --    --    --    --    --    --    --   272*   --    --    --    --    --    --    --   223*   --   262*   ALT   --   29   --    --    --   <6   --   14   --    --    < >  6   < >   --    < >   --    < >   --    <  >   --    CR  1.37*  1.87*   < >   --    < >  3.37*   < >  1.17*   < >   --    < >   --    < >   --    < >  0.74   < >  0.67   < >  0.70   GFRESTIMATED  38*  27*   < >   --    < >  13*   < >  46*   < >   --    < >   --    < >   --    < >  78   < >  87   < >  84   GFRESTBLACK  46*  32*   < >   --    < >  16*   < >  55*   < >   --    < >   --    < >   --    < >  >90   GFR Calc     < >  >90   GFR Calc     < >  >90   POTASSIUM  4.1  4.5   < >   --    < >  4.6   < >  4.3   < >   --    < >   --    < >   --    < >  3.3*   < >  4.3   < >  3.9   TSH   --    --    --   1.20   --    --    --    --    --    --    --   1.01   --    --    < >   --    --    --    < >   --     < > = values in this interval not displayed.      BP Readings from Last 3 Encounters:   03/21/17 98/58   03/17/17 130/60   03/15/17 134/59    Wt Readings from Last 3 Encounters:   03/15/17 201 lb (91.2 kg)   02/20/17 205 lb 4.8 oz (93.1 kg)   09/03/16 201 lb 8 oz (91.4 kg)            Labs reviewed in EPIC  Problem list, Medication list, Allergies, and Medical/Social/Surgical histories reviewed in [facility] and updated as appropriate.    ROS:  CONSTITUTIONAL:NEGATIVE for fever, chills, change in weight and fatigue  INTEGUMENTARY/SKIN: NEGATIVE for worrisome rashes, moles or lesions  R: NEGATIVE for significant cough or SOB  CV: NEGATIVE for chest pain, palpitations or peripheral edema  MUSCULOSKELETAL: POSITIVE  for muscle weakness generalized and hx of  shoulder and back pain  PSYCHIATRIC: NEGATIVE for concentration difficulty, Hx anxiety, fatigue,    OBJECTIVE:                                                    /60 (BP Location: Left arm, Patient Position: Supine, Cuff Size: Adult Regular)  Pulse 62  SpO2 100%  There is no height or weight on file to calculate BMI.  GENERAL: alert, mild distress, obese, and laying in bed, engaged in interview,   RESP: lungs clear to auscultation - no rales, rhonchi or wheezes  CV: regular  rate and rhythm, normal S1 S2, no S3 or S4, no murmur, click or rub, no peripheral edema  ABDOMEN: soft, nontender, no masses and bowel sounds diminished  MS: no gross musculoskeletal defects noted, no edema, limited ROM in both shoulders  SKIN: no suspicious lesions or rashes  NEURO: Decreased strength and tone, mentation intact and speech normal  PSYCH: mentation appears normal, affect normal/bright, very verbose    Diagnostic Test Results:  Results for orders placed or performed in visit on 04/03/17   CBC with platelets   Result Value Ref Range    WBC 4.3 4.0 - 11.0 10e9/L    RBC Count 3.22 (L) 3.8 - 5.2 10e12/L    Hemoglobin 9.3 (L) 11.7 - 15.7 g/dL    Hematocrit 29.8 (L) 35.0 - 47.0 %    MCV 93 78 - 100 fl    MCH 28.9 26.5 - 33.0 pg    MCHC 31.2 (L) 31.5 - 36.5 g/dL    RDW 14.5 10.0 - 15.0 %    Platelet Count 143 (L) 150 - 450 10e9/L   Basic metabolic panel   Result Value Ref Range    Sodium 136 133 - 144 mmol/L    Potassium 4.1 3.4 - 5.3 mmol/L    Chloride 105 94 - 109 mmol/L    Carbon Dioxide 23 20 - 32 mmol/L    Anion Gap 8 3 - 14 mmol/L    Glucose 143 (H) 70 - 99 mg/dL    Urea Nitrogen 45 (H) 7 - 30 mg/dL    Creatinine 1.37 (H) 0.52 - 1.04 mg/dL    GFR Estimate 38 (L) >60 mL/min/1.7m2    GFR Estimate If Black 46 (L) >60 mL/min/1.7m2    Calcium 9.2 8.5 - 10.1 mg/dL   Tacrolimus level   Result Value Ref Range    Tacrolimus Last Dose 04/03 0000     Tacrolimus Level 3.7 (L) 5.0 - 15.0 ug/L   Sirolimus level   Result Value Ref Range    Sirolimus Last Dose 04/03 0000     Sirolimus Level 6.9 5.0 - 15.0 ug/L     *Note: Due to a large number of results and/or encounters for the requested time period, some results have not been displayed. A complete set of results can be found in Results Review.        ASSESSMENT/PLAN:                                                    ASSESSMENT / PLAN:  (M25.519,  G89.29) Chronic shoulder pain, unspecified laterality  (primary encounter diagnosis)  Comment: Improved, rarely using  tramadol  Plan: Continue current treatment, minimize tramadol    (E66.01) Morbid obesity, unspecified obesity type (H)  Comment: improving, now trying to get up in her chair   Plan: work on strength building, try and get up in your chair 1x a week     (E11.42) Diabetic polyneuropathy associated with type 2 diabetes mellitus (H)  Comment: Has improved with increase in her gabapentin, sugars still elevated  Plan: maintain 900mg of gabapentin 2x a day, will follow up with PharmD in one month, recent insulin adjustment     (Z94.0) Kidney replaced by transplant  Comment: Kidney function improving, recent labs 4/3/17  Plan: monitor, kidney transplant team following     (I10) Benign essential hypertension  Comment: stable   Plan: no med changes     (N39.0) Recurrent UTI  Comment: stable for now   Plan: order for DME        Will try and use a silver impregnated cath to reduce infections    (E53.8) Vitamin B12 deficiency (non anemic)  Comment: Patient has 3 doses of B12 in her home, reordered today   Plan: The following medication was given:     MEDICATION: Vitamin B12  1,000mcg  ROUTE: IM  SITE: Deltoid - Left  DOSE: 1ml  LOT #: [id number]  :  [employer]  EXPIRATION DATE:  07/18  NDC: 58391917-32      Patient Instructions   We have ordered silver impregnated caths to be used at cath changes to help with UTI's     We will call your [facility] nurse to let her know about the new cath    I have given you your B12 today     Please continue to work on your diet     We will see 5/3 at 2pm          Marivel Barcenas NP, APRN [facility]    Visit time 60  min with > than 50% of the time spent in counseling on: care coordination, DM, morbid obesity, HTN, shoulder pain, kidney transplant,

## 2017-04-06 ENCOUNTER — TELEPHONE (OUTPATIENT)
Dept: FAMILY MEDICINE | Facility: CLINIC | Age: 72
End: 2017-04-06

## 2017-04-06 NOTE — TELEPHONE ENCOUNTER
Received CC'd message from provider after her office visit with patient:    I have ordered silver impregnated cath, please send to [facility] or [facility]   Please call her [facility] nurse and let her know that I gave her her B12 injection today, please also let them know that I have ordered her silver impregnated caths that they should use with her next cath change     Sent message to SEKOU Cooney [facility] with information as noted by provider.  Instructed to contact clinic if homecare plans to give patient her monthly B12 injection moving forward.    Wendie Harris RN

## 2017-04-06 NOTE — TELEPHONE ENCOUNTER
Writer also reviewed notes from yesterday's visit and did not note any mention of cream for her rash on her face.      Routing to provider to determine if there is a recommendation for a cream for rash on patient's face.    Wendie Harris RN

## 2017-04-06 NOTE — TELEPHONE ENCOUNTER
Call from Shantelle.  She stated that at visit yesterday Klaudia suggested she purchase a cream for the rash on her face, but Shantelle cannot remember the name.  She said she thinks it was either neosporin or benadryl.    Writer scanned Klaudia's note and was unable to determine what it was.  Shantelle said her  is going out to do errands and she is hoping to have him pick some up today.    She would like a call back to [phone]    Will route high priority (non emergent) so that RNs can talk to Klaudia when they see her.

## 2017-04-07 NOTE — TELEPHONE ENCOUNTER
Received message from SEKOU Cooney [facility] who states they will start using the silver impregnated catheters once they receive them.  They will also start giving the B12 injections as they did before.    Ivet read in Epic that the cath was being changed every 3 weeks.  She would like to know if it should now be changed every 3 or 4 weeks.    Routing to provider to review.    Wendie Harris RN

## 2017-04-10 NOTE — TELEPHONE ENCOUNTER
Spoke with provider on call on 4/7/17 who states ok to monitor patient and change cath every 3-4 weeks dependent on how urine is flowing, etc.    Sent message to Ivet with information as noted.    Wendie Harris RN

## 2017-04-17 ENCOUNTER — CARE COORDINATION (OUTPATIENT)
Dept: CARE COORDINATION | Facility: CLINIC | Age: 72
End: 2017-04-17

## 2017-04-17 ENCOUNTER — DOCUMENTATION ONLY (OUTPATIENT)
Dept: CARE COORDINATION | Facility: CLINIC | Age: 72
End: 2017-04-17

## 2017-04-17 NOTE — PROGRESS NOTES
Routing information as noted by SEKOU Cooney [facility] to [facility] for review.    Wendie Harris RN

## 2017-04-17 NOTE — PROGRESS NOTES
Clinic Care Coordination Contact  OUTREACH    Referral Information:  Referral Source: MTM  Reason for Contact: Resource navigation  Care Conference: No     Universal Utilization:   ED Visits in last year: 1  Hospital visits in last year: 5  Last PCP appointment: 04/05/17  Missed Appointments: 0  Concerns: Yes  Multiple Providers or Specialists: Yes    Clinical Concerns:   Current Medical Concerns: Not discussed at this contact.     Current Behavioral Concerns: No behavioral concerns discussed at this contact.    Education Provided to patient: N/A.   Clinical Pathway Name: None  Clinical Pathway: None    Medication Management:  Not discussed at this contact.     Functional Status:  Mobility Status: Dependent/Assisted by Another  Equipment Currently Used at Home: lift device, wheelchair, other (see comments)     Psychosocial:  Current living arrangement:: I live in a private home with spouse  Shantelle belonged to the [employer] about 10 years ago and would like to be connected to services to receive books on tape.     Resources and Interventions:  Current Resources:   Advanced Care Plans/Directives on file:: Yes     Goals: N/A  Patient/Caregiver understanding: Shantelle indicated she understood and agreed with the current plan of care.    Frequency of Care Coordination: prn  Upcoming appointment: 05/03/17     Plan: SW will mail out an application for the [employer] and the [place] [employer]. SW will follow up in two weeks via phone on resources given.     RAMILA Mota  Social Work Care Coordinator  [facility] Care [facility], Mobile Team

## 2017-04-17 NOTE — PROGRESS NOTES
[facility] now requests orders and shares plan of care/discharge summaries for some patients through [employer].  Please REPLY TO THIS MESSAGE in order to give authorization for orders when needed.  This is considered a verbal order, you will still receive a faxed copy of orders for signature.  Thank you for your assistance in improving collaboration for our patients.    ORDER PT 1 day 1, HA 2wk8, 1wk1, SN 1wk2, 9fttgn4, 1month 1, 6prn    MD SUMMARY/PLAN OF CARE PT to eval and treat home safety, strengthening exercises. Spouse and son helped use christiana to get patient into wheelchair for about 1 hour. Pt has difficulty sitting for long periods due to vertigo. HA to assist with personal cares. SN for assessment with focus on S/S UTI, catheter care, medication management and reconciliation, pain management, mental health status and need for referral or change in treatment plan, skin integrity, falls risk, and to notify physician for the following clinical finding parameter of blood sugar less than 70 or greater than 400. Instruct pt. and caregiver on treatments, disease process, signs/symptoms of complications to report to agency/physician/911, emergency/safety and falls prevention plan, medication effects/SE, new/high risk/changed medications, ADA diet, and activity.  Implement interventions to monitor and mitigate pain, prevent pressure ulcers and confirm proper foot care. Powder to sacral area prn. 6 prn visits for change/decline in condition, skin integrity, labs, supervisory visits per agency protocol, recertification assessments.   SN to change rico catheter monthy and PRN. 18 Fr silver impregnated catheter with 30 cc balloon, use two percent lidocaine with syringe for catheter change and let sit for five minutes. Inflate balloon 25-30cc. OK to irrigate with 30-60cc NS PRN. SN to admin monthly cyanocobalamin 0.9ml 1000mcg/ml injection IM. Last given on 4/5/17.

## 2017-04-17 NOTE — PROGRESS NOTES
[facility] now requests orders and shares plan of care/discharge summaries for some patients through [employer].  Please REPLY TO THIS MESSAGE in order to give authorization for orders when needed.  This is considered a verbal order, you will still receive a faxed copy of orders for signature.  Thank you for your assistance in improving collaboration for our patients.    ORDER    MD SUMMARY/PLAN OF CARE  Spouse is concerned about elevated BG. BG around 11pm before patient eats dinner is 220s to 380s. He was wondering if [employer] does should be raised. He said Conchita is visiting on 4/18/17. I just wanted to pass this information along. Thank you.

## 2017-04-17 NOTE — PROGRESS NOTES
Sent message to SEKOU Cooney [facility] giving verbal ok on orders requested per RN protocol.    Wendie Harris RN

## 2017-04-18 ENCOUNTER — OFFICE VISIT (OUTPATIENT)
Dept: PHARMACY | Facility: CLINIC | Age: 72
End: 2017-04-18
Payer: COMMERCIAL

## 2017-04-18 ENCOUNTER — HOSPITAL ENCOUNTER (INPATIENT)
Facility: CLINIC | Age: 72
LOS: 9 days | Discharge: HOME HEALTH | DRG: 698 | End: 2017-04-28
Attending: EMERGENCY MEDICINE | Admitting: INTERNAL MEDICINE
Payer: MEDICARE

## 2017-04-18 DIAGNOSIS — E11.3299 DIABETES MELLITUS WITH BACKGROUND RETINOPATHY (H): Primary | ICD-10-CM

## 2017-04-18 DIAGNOSIS — N39.0 URINARY TRACT INFECTION ASSOCIATED WITH INDWELLING URETHRAL CATHETER, SUBSEQUENT ENCOUNTER: ICD-10-CM

## 2017-04-18 DIAGNOSIS — E83.52 HYPERCALCEMIA: ICD-10-CM

## 2017-04-18 DIAGNOSIS — G89.29 CHRONIC SHOULDER PAIN, UNSPECIFIED LATERALITY: ICD-10-CM

## 2017-04-18 DIAGNOSIS — T83.511D URINARY TRACT INFECTION ASSOCIATED WITH INDWELLING URETHRAL CATHETER, SUBSEQUENT ENCOUNTER: ICD-10-CM

## 2017-04-18 DIAGNOSIS — E11.42 DIABETIC POLYNEUROPATHY ASSOCIATED WITH TYPE 2 DIABETES MELLITUS (H): ICD-10-CM

## 2017-04-18 DIAGNOSIS — E08.42 DIABETIC POLYNEUROPATHY ASSOCIATED WITH DIABETES MELLITUS DUE TO UNDERLYING CONDITION (H): ICD-10-CM

## 2017-04-18 DIAGNOSIS — I10 BENIGN ESSENTIAL HYPERTENSION: ICD-10-CM

## 2017-04-18 DIAGNOSIS — M25.519 CHRONIC SHOULDER PAIN, UNSPECIFIED LATERALITY: ICD-10-CM

## 2017-04-18 DIAGNOSIS — E11.3299 DIABETES MELLITUS WITH BACKGROUND RETINOPATHY (H): ICD-10-CM

## 2017-04-18 DIAGNOSIS — N39.0 URINARY TRACT INFECTION, SITE UNSPECIFIED: ICD-10-CM

## 2017-04-18 DIAGNOSIS — Z94.0 KIDNEY REPLACED BY TRANSPLANT: ICD-10-CM

## 2017-04-18 DIAGNOSIS — I26.99 PULMONARY EMBOLISM AND INFARCTION (H): ICD-10-CM

## 2017-04-18 DIAGNOSIS — R41.82 ALTERED MENTAL STATUS, UNSPECIFIED ALTERED MENTAL STATUS TYPE: ICD-10-CM

## 2017-04-18 DIAGNOSIS — N39.0 RECURRENT UTI: Primary | ICD-10-CM

## 2017-04-18 DIAGNOSIS — Z94.0 KIDNEY TRANSPLANT RECIPIENT: ICD-10-CM

## 2017-04-18 DIAGNOSIS — N39.0 URINARY TRACT INFECTION, SITE NOT SPECIFIED: ICD-10-CM

## 2017-04-18 PROCEDURE — 99607 MTMS BY PHARM ADDL 15 MIN: CPT | Performed by: PHARMACIST

## 2017-04-18 PROCEDURE — 99606 MTMS BY PHARM EST 15 MIN: CPT | Performed by: PHARMACIST

## 2017-04-18 PROCEDURE — 93010 ELECTROCARDIOGRAM REPORT: CPT | Mod: Z6 | Performed by: EMERGENCY MEDICINE

## 2017-04-18 PROCEDURE — 99285 EMERGENCY DEPT VISIT HI MDM: CPT | Mod: 25 | Performed by: EMERGENCY MEDICINE

## 2017-04-18 RX ORDER — ONDANSETRON 2 MG/ML
4 INJECTION INTRAMUSCULAR; INTRAVENOUS ONCE
Status: COMPLETED | OUTPATIENT
Start: 2017-04-18 | End: 2017-04-19

## 2017-04-18 NOTE — LETTER
Transition Communication Hand-off for Care Transitions to Next Level of Care Provider    Name: Gem Jaed  MRN #: [id number]  Primary Care Provider: Marivel Barcenas NP     Primary Clinic:  [facility] [address] [facility] [address]  [place] [address]     Reason for Hospitalization:  Kidney replaced by transplant [Z94.0]  Altered mental status, unspecified altered mental status type [R41.82]  Urinary tract infection, site unspecified [N39.0]  Admit Date/Time: 4/18/2017 11:15 PM  Discharge Date: anticipated date of 4/22/17  Payor Source: Payor: MEDICA / Plan: MEDICA PRIME SOLUTION / Product Type: Indemnity /              Reason for Communication Hand-off Referral: Fragility    Discharge Plan:  Home with resumption of home care     Concern for non-adherence with plan of care:   Y/N N  Discharge Needs Assessment:  Needs       Most Recent Value    Equipment Currently Used at Home lift device, wheelchair    Transportation Available van, wheelchair accessible, family or friend will provide, ambulance    Home Care [facility] [phone], Fax: [phone]            Follow-up specialty is recommended: Yes    Follow-up plan:  Future Appointments  Date Time Provider Department Center   5/3/2017 2:00 PM Marivel Barcenas APRN CNP [facility]   5/24/2017 2:00 PM Conchita [place], [facility]   6/7/2017 2:00 PM Marivel Barcenas APRN CNP [facility]       Any outstanding tests or procedures:        Referrals     Future Labs/Procedures    Home care nursing referral     Comments:    [facility]    RN skilled nursing visit. RN to assess respiratory and cardiac status and hydration, nutrition and bowel status.  Resume previous orders  Home health aide to Resume previous orders    Your provider has ordered home care nursing services. If you have not been contacted within 2 days of your discharge please call the inpatient department phone number at [phone]  .    Medication Therapy Management Referral     Comments:    Reason for referral:  on more than 5 medications and managing chronic disease and on more than 10 medications and hospitalized or in the ED in the past 6 months     This service is designed to help you get the most from your medications.  A specially trained pharmacist will work closely with you and your doctors  to solve any problems related to your medications and to help you get the   best results from taking them.      The Medication Therapy Management staff will call you to schedule an appointment.            Key Recommendations:      [place]    AVS/Discharge Summary is the source of truth; this is a helpful guide for improved communication of patient story

## 2017-04-18 NOTE — IP AVS SNAPSHOT
MRN:[id number]                      After Visit Summary   4/18/2017    Gem Jade    MRN: [id number]           Thank you!     Thank you for choosing [facility] for your care. Our goal is always to provide you with excellent care. Hearing back from our patients is one way we can continue to improve our services. Please take a few minutes to complete the written survey that you may receive in the mail after you visit with us. Thank you!        Patient Information     Date Of Birth          1945        Designated Caregiver       Most Recent Value    Caregiver    Will someone help with your care after discharge? yes    Name of designated caregiver     Phone number of caregiver Unknown    Caregiver address Unknown      About your hospital stay     You were admitted on:  April 19, 2017 You last received care in the:  [address] [facility] [place]    You were discharged on:  April 28, 2017        Reason for your hospital stay       You were admitted to the hospital for a urinary tract infection.  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                  Who to Call     For medical emergencies, please call 911.  For non-urgent questions about your medical care, please call your primary care provider or clinic, [phone]          Attending Provider     Provider Specialty    Marielle Maldonado MD Emergency Medicine    [facility], Paola Poon MD Internal Medicine    Prudecnio Linares MD Internal Medicine    [facility], Christiano Pak MD Internal Medicine       Primary Care Provider Office Phone # Fax #    ANTHONY Antonio [facility] [phone]        [facility] [address] [place] [id number]         When to contact your care team       Call your primary doctor if you have any of the following: temperature greater than 100.5,  increased shortness of breath, increased pain or worsening confusion.                   After Care Instructions     Activity       Your activity upon discharge: activity as tolerated            Diet       Follow this diet upon discharge: Orders Placed This Encounter      Room Service      Snacks/Supplements Adult: Glucerna (Adult); Between Meals      Regular Diet Adult - Low potassium            Discharge Instructions       Check your blood pressure 2 times daily, an hour after taking labetalol.   In the morning, if blood pressure is > 160, take the amlodipine medication.   Record blood pressure values and  If needing to take amlodipine in the morning.  Renal clinic will call in 1 week to follow up on blood pressure values.            Tubes and drains       You are going home with the following tubes or drains: silver impregnated rico catheter.  Replace with silver impregnated rico catheters every 2-3 weeks.                  Follow-up Appointments     Adult [facility] Follow-up and recommended labs and tests       Follow up with primary care provider, Marivel Barcenas NP, within 7 days to evaluate medication change, for hospital follow- up and regarding new diagnosis.  The following labs/tests are recommended: Follow up BMP and Tacrolimus level that will be drawn on Monday 5/1.  Appointment already scheduled for Wednesday 5/3/17 at 2 pm, home visit.     [facility] to call in 1 week to follow up on blood pressure and lab results.       Appointments on [place] and/or [facility] (with [facility] or [facility] provider or service). Call [phone] if you haven't heard regarding these appointments within 7 days of discharge.                  Your next 10 appointments already scheduled     May 03, 2017  2:00 PM CDT   Return Visit with ANTHONY Antonio CNP   [facility] ([facility])    [address] [place] [address]  [address]  [facility] [address]   [phone]            May 24, 2017  2:00 PM CDT   Office Visit with Conchita  Cecile Toledo, [facility] ([facility])    [address] [place] [address]  [address]  [facility] [address] [phone]           Bring a current list of meds and any records pertaining to this visit.  For Physicals, please bring immunization records and any forms needing to be filled out.  Please arrive 10 minutes early to complete paperwork.            Jun 07, 2017  2:00 PM CDT   Return Visit with ANTHONY Antonio CNP   [facility]    [address] [place] [address]  [address]  [facility] [address] [phone]              Additional Services     Home care nursing referral       [facility]    RN skilled nursing visit. RN to assess respiratory and cardiac status and hydration, nutrition and bowel status.  Resume previous orders  Home health aide to Resume previous orders.    Silver impregnated rico changes [id number]  BMP and tacrolimus labs on Monday 5/1/17.  Notify Sanjana [phone] from renal clinic with results and she should call patient with updates.    Check BP twice a day, an hour after taking labetalol.    In the morning, if her Systolic BP is > 160, give Amlodipine 5 mg tab.    Please chart her blood pressures twice a day and note if she had received amlodipine that day.    Notify Sanjana from renal clinic with the results of 1 week of blood pressure values and she will determine if amlodipine needs to be scheduled.     Your provider has ordered home care nursing services. If you have not been contacted within 2 days of your discharge please call the inpatient department phone number at [phone] .            Medication Therapy Management Referral       Reason for referral:  on more than 5 medications and managing chronic disease and on more than 10 medications and hospitalized or in the ED in the past 6 months     This service is designed to help you get the most from your  "medications.  A specially trained pharmacist will work closely with you and your doctors  to solve any problems related to your medications and to help you get the   best results from taking them.      The Medication Therapy Management staff will call you to schedule an appointment.                  Future tests that were ordered for you     Basic metabolic panel           Tacrolimus level       Must be a trough level.                  Further instructions from your care team       Check BP twice a day, an hour after taking labetalol.    In the morning, if her Systolic BP is > 160, give Amlodipine 5 mg tab.    Please chart her blood pressures twice a day and note if she had received amlodipine that day.    Notify Sanjana from renal clinic with the results of 1 week of blood pressure values and she will determine if amlodipine needs to be scheduled.     Pending Results     Date and Time Order Name Status Description    4/27/2017 1855 Urine Culture Aerobic Bacterial Preliminary     4/27/2017 1012 Surgical Path Exam In process     4/25/2017 1228 Free kappa and lambda light chains, urine In process             Statement of Approval     Ordered          04/28/17 1616  I have reviewed and agree with all the recommendations and orders detailed in this document.  EFFECTIVE NOW     Approved and electronically signed by:  Miguelito Green MD             Admission Information     Date & Time Provider Department Dept. Phone    4/18/2017 Christiano Webber MD [address] [facility] [place] [phone]      Your Vitals Were     Blood Pressure Pulse Temperature Respirations Height Weight    137/50 (BP Location: Right arm) 68 97.8  F (36.6  C) (Oral) 16 1.702 m (5' 7.01\") 90.5 kg (199 lb 8 oz)    Pulse Oximetry BMI (Body Mass Index)                98% 31.24 kg/m2          Care EveryWhere ID     This is your Care EveryWhere ID. This could be used by other organizations to access your [facility] medical records  [id number]         "   Review of your medicines      START taking        Dose / Directions    amLODIPine 5 MG tablet   Commonly known as:  NORVASC   Used for:  Kidney replaced by transplant, Benign essential hypertension        Dose:  5 mg   Take 1 tablet (5 mg) by mouth daily as needed For systolic blood pressure > 160 when checked 1 hour after taking labetalol.   Quantity:  90 tablet   Refills:  3       amoxicillin 500 MG capsule   Commonly known as:  AMOXIL   Used for:  Recurrent UTI        Dose:  500 mg   Take 1 capsule (500 mg) by mouth 3 times daily   Quantity:  18 capsule   Refills:  0       cinacalcet 30 MG tablet   Commonly known as:  SENSIPAR   Used for:  Kidney replaced by transplant, Hypercalcemia        Dose:  30 mg   Take 1 tablet (30 mg) by mouth daily   Quantity:  60 tablet   Refills:  3       losartan 25 MG tablet   Commonly known as:  COZAAR   Used for:  Kidney replaced by transplant, Benign essential hypertension        Dose:  25 mg   Take 1 tablet (25 mg) by mouth daily   Quantity:  90 tablet   Refills:  3       * tacrolimus 0.5 MG capsule   Commonly known as:  PROGRAF - GENERIC EQUIVALENT   Used for:  Kidney transplant recipient   Replaces:  tacrolimus 1 MG capsule        Dose:  0.5 mg   Take 1 capsule (0.5 mg) by mouth 2 times daily   Quantity:  180 capsule   Refills:  3       * tacrolimus 1 MG capsule   Commonly known as:  PROGRAF - GENERIC EQUIVALENT   Used for:  Kidney replaced by transplant        Dose:  1 mg   Take 1 capsule (1 mg) by mouth 2 times daily   Quantity:  180 capsule   Refills:  3       * Notice:  This list has 2 medication(s) that are the same as other medications prescribed for you. Read the directions carefully, and ask your doctor or other care provider to review them with you.      CONTINUE these medicines which may have CHANGED, or have new prescriptions. If we are uncertain of the size of tablets/capsules you have at home, strength may be listed as something that might have changed.         Dose / Directions    aspirin 81 MG chewable tablet   This may have changed:    - medication strength  - additional instructions   Used for:  Pulmonary embolism and infarction (H), Diabetes mellitus with background retinopathy (H)        Dose:  81 mg   Start taking on:  5/4/2017   Take 1 tablet (81 mg) by mouth daily Starting 1 week after your kidney biopsy   Quantity:  90 tablet   Refills:  3       gabapentin 300 MG capsule   Commonly known as:  NEURONTIN   This may have changed:    - how much to take  - how to take this  - when to take this  - reasons to take this  - additional instructions   Used for:  Diabetic polyneuropathy associated with type 2 diabetes mellitus (H)        Dose:  300-600 mg   Take 1-2 capsules (300-600 mg) by mouth 3 times daily as needed For pain   Quantity:  180 capsule   Refills:  3       insulin glargine 100 UNIT/ML injection   Commonly known as:  LANTUS SOLOSTAR   This may have changed:  additional instructions   Used for:  Diabetes mellitus with background retinopathy (H)        Inject 16 units under the skin daily.   Quantity:  15 mL   Refills:  3       labetalol 300 MG tablet   Commonly known as:  NORMODYNE   This may have changed:    - medication strength  - how much to take   Used for:  Benign essential hypertension, Kidney replaced by transplant        Dose:  300 mg   Take 1 tablet (300 mg) by mouth 2 times daily   Quantity:  120 tablet   Refills:  3       order for DME   This may have changed:  Another medication with the same name was removed. Continue taking this medication, and follow the directions you see here.   Used for:  Johnson catheter status        Equipment being ordered: Johnson Catheters - 18 french   Quantity:  3 catheter   Refills:  PRN       OXcarbazepine 300 MG tablet   Commonly known as:  TRILEPTAL   This may have changed:    - when to take this  - reasons to take this  - additional instructions   Used for:  Diabetic polyneuropathy associated with diabetes mellitus  due to underlying condition (H)        Dose:  300 mg   Take 1 tablet (300 mg) by mouth 2 times daily as needed For pain   Quantity:  180 tablet   Refills:  3       predniSONE 5 MG tablet   Commonly known as:  DELTASONE   This may have changed:    - medication strength  - how much to take   Used for:  Kidney replaced by transplant        Dose:  5 mg   Take 1 tablet (5 mg) by mouth daily   Quantity:  90 tablet   Refills:  3       TraMADol HCl 50 MG Tbdp   Indication:  Moderate to Moderately Severe Pain   This may have changed:  when to take this   Used for:  Chronic shoulder pain, unspecified laterality        Dose:  50 mg   Take 50 mg by mouth 3 times daily as needed   Quantity:  90 tablet   Refills:  3         CONTINUE these medicines which have NOT CHANGED        Dose / Directions    ACETAMINOPHEN PO        Dose:  325-650 mg   Take 325-650 mg by mouth every 4 hours as needed.   Refills:  0       alcohol swab prep pads   Used for:  Type 2 diabetes, HbA1C goal < 8% (H)        Use to swab area of injection/yvon and vials as directed.   Quantity:  1 Box   Refills:  12       blood glucose monitoring test strip   Commonly known as:  ACCU-CHEK COMPACT PLUS   Used for:  Type 2 diabetes mellitus with diabetic polyneuropathy, with long-term current use of insulin (H)        Use to test blood sugar 3 times daily or as directed.   Quantity:  100 strip   Refills:  11       clotrimazole 1 % cream   Commonly known as:  LOTRIMIN        Apply topically 2 times daily Apply to buttocks twice a day until rash resolved.   Quantity:  40 g   Refills:  0       Cranberry 400 MG Tabs        Dose:  400 mg   Take 400 mg by mouth 2 times daily   Refills:  0       cyanocobalamin 1000 MCG/ML injection   Commonly known as:  VITAMIN B12   Used for:  Iron deficiency anemia, unspecified iron deficiency anemia type        Dose:  1000 mcg   Inject 1 mL (1,000 mcg) into the muscle every 30 days   Quantity:  3 mL   Refills:  3        diphenoxylate-atropine 2.5-0.025 MG per tablet   Commonly known as:  LOMOTIL   Used for:  Diarrhea of presumed infectious origin        Dose:  1 tablet   Take 1 tablet by mouth 4 times daily as needed for diarrhea   Quantity:  50 tablet   Refills:  3       famotidine 20 MG tablet   Commonly known as:  PEPCID        Dose:  20 mg   Take 1 tablet (20 mg) by mouth 2 times daily   Quantity:  60 tablet   Refills:  1       GLUCAGON EMERGENCY 1 MG kit   Used for:  Hypoglycemia   Generic drug:  glucagon        Dose:  1 mg   Inject 1 mg into the muscle once   Quantity:  1 each   Refills:  11       insulin pen needle 30G X 8 MM   Used for:  Type 2 diabetes mellitus with diabetic nephropathy (H)        Use as directed with Lantus   Quantity:  100 each   Refills:  3       lactobacillus rhamnosus (GG) capsule   Used for:  Diarrhea        Dose:  1 capsule   Take 1 capsule by mouth 2 times daily   Quantity:  60 capsule   Refills:  11       levothyroxine 50 MCG tablet   Commonly known as:  SYNTHROID   Used for:  Other specified hypothyroidism        Dose:  50 mcg   Take 1 tablet (50 mcg) by mouth daily   Quantity:  90 tablet   Refills:  3       loratadine 10 MG tablet   Commonly known as:  CLARITIN   Used for:  Acute nasopharyngitis        Dose:  10 mg   Take 1 tablet (10 mg) by mouth daily   Quantity:  90 tablet   Refills:  3       meclizine 12.5 MG tablet   Commonly known as:  ANTIVERT        Dose:  12.5 mg   Take 1 tablet (12.5 mg) by mouth 3 times daily   Quantity:  90 tablet   Refills:  1       NovoLOG FLEXPEN 100 UNIT/ML injection   Used for:  Diabetes mellitus with background retinopathy (H)   Generic drug:  insulin aspart        Inject 10 units before meals BID. At bedtime use following sliding scale:  150-200 5u 201-250 7u 251-300 9u 301-350 11u 351+ 13u   Quantity:  15 mL   Refills:  0       NYSTOP [id number] UNIT/GM Powd   Used for:  Fungal infection   Generic drug:  nystatin        Dose:  1 g   Externally apply 1 g topically  "3 times daily as needed   Quantity:  60 g   Refills:  6       ondansetron 4 MG ODT tab   Commonly known as:  ZOFRAN ODT   Used for:  Nausea        Dose:  4-8 mg   Take 1-2 tablets (4-8 mg) by mouth every 8 hours as needed for nausea   Quantity:  20 tablet   Refills:  5       * order for DME   Used for:  Fecal incontinence        Equipment being ordered: Blue Chux Please send to [facility]   Quantity:  100 pad   Refills:  3       * order for DME   Used for:  Obstructive sleep apnea syndrome        Equipment being ordered: Cedexis FX CPAP interface (nasal pillows), size XS.   Quantity:  1 each   Refills:  1       * order for DME   Used for:  Recurrent UTI        Equipment being ordered: Silver Impregnated catheters HX of frequent UTI   Quantity:  12 each   Refills:  1       pravastatin 10 MG tablet   Commonly known as:  PRAVACHOL   Used for:  Hyperlipidemia LDL goal <100        Dose:  10 mg   Take 1 tablet (10 mg) by mouth daily   Quantity:  90 tablet   Refills:  3       simethicone 125 MG Chew chewable tablet   Commonly known as:  MYLICON        Dose:  125 mg   Take 1 tablet (125 mg) by mouth as needed for intestinal gas   Quantity:  120 tablet   Refills:  0       syringe/needle (disp) 22G X 1-1/2\" 3 ML Misc   Commonly known as:  B-D SYRINGE/NEEDLE   Used for:  Iron deficiency anemia, unspecified iron deficiency anemia type        Dose:  1 Syringe   Inject 1 Syringe into the muscle every 30 days   Quantity:  50 each   Refills:  0       * Notice:  This list has 3 medication(s) that are the same as other medications prescribed for you. Read the directions carefully, and ask your doctor or other care provider to review them with you.      STOP taking     cloNIDine 0.1 MG tablet   Commonly known as:  CATAPRES           sirolimus 0.5 MG tablet   Commonly known as:  RAPAMUNE - GENERIC EQUIVALENT           tacrolimus 1 MG capsule   Commonly known as:  PROGRAF - GENERIC EQUIVALENT   Replaced by:  tacrolimus 0.5 MG capsule "                Where to get your medicines      These medications were sent to [facility] - [place], [place] - [address]  [address] [facility] [address]     Phone:  [phone]     amLODIPine 5 MG tablet    amoxicillin 500 MG capsule    aspirin 81 MG chewable tablet    cinacalcet 30 MG tablet    gabapentin 300 MG capsule    insulin glargine 100 UNIT/ML injection    labetalol 300 MG tablet    losartan 25 MG tablet    OXcarbazepine 300 MG tablet    predniSONE 5 MG tablet    tacrolimus 0.5 MG capsule    tacrolimus 1 MG capsule                Protect others around you: Learn how to safely use, store and throw away your medicines at www.disposemymeds.org.             Medication List: This is a list of all your medications and when to take them. Check marks below indicate your daily home schedule. Keep this list as a reference.      Medications           Morning Afternoon Evening Bedtime As Needed    ACETAMINOPHEN PO   Take 325-650 mg by mouth every 4 hours as needed.                                alcohol swab prep pads   Use to swab area of injection/yvon and vials as directed.                                amLODIPine 5 MG tablet   Commonly known as:  NORVASC   Take 1 tablet (5 mg) by mouth daily as needed For systolic blood pressure > 160 when checked 1 hour after taking labetalol.   Last time this was given:  10 mg on 4/28/2017  8:27 AM                                amoxicillin 500 MG capsule   Commonly known as:  AMOXIL   Take 1 capsule (500 mg) by mouth 3 times daily                                aspirin 81 MG chewable tablet   Take 1 tablet (81 mg) by mouth daily Starting 1 week after your kidney biopsy   Start taking on:  5/4/2017   Last time this was given:  81 mg on 4/25/2017  8:35 AM                                blood glucose monitoring test strip   Commonly known as:  ACCU-CHEK COMPACT PLUS   Use to test blood sugar 3 times daily or as directed.                                 cinacalcet 30 MG tablet   Commonly known as:  SENSIPAR   Take 1 tablet (30 mg) by mouth daily   Last time this was given:  30 mg on 4/28/2017  8:27 AM                                clotrimazole 1 % cream   Commonly known as:  LOTRIMIN   Apply topically 2 times daily Apply to buttocks twice a day until rash resolved.                                Cranberry 400 MG Tabs   Take 400 mg by mouth 2 times daily                                cyanocobalamin 1000 MCG/ML injection   Commonly known as:  VITAMIN B12   Inject 1 mL (1,000 mcg) into the muscle every 30 days                                diphenoxylate-atropine 2.5-0.025 MG per tablet   Commonly known as:  LOMOTIL   Take 1 tablet by mouth 4 times daily as needed for diarrhea                                famotidine 20 MG tablet   Commonly known as:  PEPCID   Take 1 tablet (20 mg) by mouth 2 times daily   Last time this was given:  20 mg on 4/28/2017  8:26 AM                                gabapentin 300 MG capsule   Commonly known as:  NEURONTIN   Take 1-2 capsules (300-600 mg) by mouth 3 times daily as needed For pain                                GLUCAGON EMERGENCY 1 MG kit   Inject 1 mg into the muscle once   Generic drug:  glucagon                                insulin glargine 100 UNIT/ML injection   Commonly known as:  LANTUS SOLOSTAR   Inject 16 units under the skin daily.   Last time this was given:  12 Units on 4/28/2017 12:27 AM                                insulin pen needle 30G X 8 MM   Use as directed with Lantus                                labetalol 300 MG tablet   Commonly known as:  NORMODYNE   Take 1 tablet (300 mg) by mouth 2 times daily   Last time this was given:  300 mg on 4/28/2017  8:27 AM                                lactobacillus rhamnosus (GG) capsule   Take 1 capsule by mouth 2 times daily                                levothyroxine 50 MCG tablet   Commonly known as:  SYNTHROID   Take 1 tablet (50 mcg) by mouth daily    Last time this was given:  50 mcg on 4/28/2017  8:28 AM                                loratadine 10 MG tablet   Commonly known as:  CLARITIN   Take 1 tablet (10 mg) by mouth daily                                losartan 25 MG tablet   Commonly known as:  COZAAR   Take 1 tablet (25 mg) by mouth daily   Last time this was given:  25 mg on 4/28/2017  2:48 PM                                meclizine 12.5 MG tablet   Commonly known as:  ANTIVERT   Take 1 tablet (12.5 mg) by mouth 3 times daily   Last time this was given:  12.5 mg on 4/28/2017  3:43 PM                                NovoLOG FLEXPEN 100 UNIT/ML injection   Inject 10 units before meals BID. At bedtime use following sliding scale:  150-200 5u 201-250 7u 251-300 9u 301-350 11u 351+ 13u   Last time this was given:  1 Units on 4/28/2017 12:36 PM   Generic drug:  insulin aspart                                NYSTOP [id number] UNIT/GM Powd   Externally apply 1 g topically 3 times daily as needed   Generic drug:  nystatin                                ondansetron 4 MG ODT tab   Commonly known as:  ZOFRAN ODT   Take 1-2 tablets (4-8 mg) by mouth every 8 hours as needed for nausea                                order for DME   Equipment being ordered: Johnson Catheters - 18 french                                * order for DME   Equipment being ordered: Blue Chux Please send to [facility]                                * order for DME   Equipment being ordered: Ahuja FX CPAP interface (nasal pillows), size XS.                                * order for DME   Equipment being ordered: Silver Impregnated catheters HX of frequent UTI                                OXcarbazepine 300 MG tablet   Commonly known as:  TRILEPTAL   Take 1 tablet (300 mg) by mouth 2 times daily as needed For pain   Last time this was given:  300 mg on 4/19/2017  9:13 AM                                pravastatin 10 MG tablet   Commonly known as:  PRAVACHOL   Take 1 tablet (10 mg) by mouth  "daily   Last time this was given:  10 mg on 4/28/2017  8:26 AM                                predniSONE 5 MG tablet   Commonly known as:  DELTASONE   Take 1 tablet (5 mg) by mouth daily   Last time this was given:  2.5 mg on 4/28/2017  8:27 AM                                simethicone 125 MG Chew chewable tablet   Commonly known as:  MYLICON   Take 1 tablet (125 mg) by mouth as needed for intestinal gas                                syringe/needle (disp) 22G X 1-1/2\" 3 ML Misc   Commonly known as:  B-D SYRINGE/NEEDLE   Inject 1 Syringe into the muscle every 30 days                                * tacrolimus 0.5 MG capsule   Commonly known as:  PROGRAF - GENERIC EQUIVALENT   Take 1 capsule (0.5 mg) by mouth 2 times daily   Last time this was given:  1.5 mg on 4/28/2017  8:27 AM                                * tacrolimus 1 MG capsule   Commonly known as:  PROGRAF - GENERIC EQUIVALENT   Take 1 capsule (1 mg) by mouth 2 times daily   Last time this was given:  1.5 mg on 4/28/2017  8:27 AM                                TraMADol HCl 50 MG Tbdp   Take 50 mg by mouth 3 times daily as needed                                * Notice:  This list has 5 medication(s) that are the same as other medications prescribed for you. Read the directions carefully, and ask your doctor or other care provider to review them with you.      "

## 2017-04-18 NOTE — PROGRESS NOTES
SUBJECTIVE/OBJECTIVE:                                                    Gem Jade is a 71 year old female seen at their home for a follow-up visit for Medication Therapy Management.  She was referred to me from Marivel Barcenas. Pt unfortunately also had her bath aid at home at the same time, so majority of visit today completed with her  Keshawn.     Chief Complaint: Follow up from our visit on 3/21/17.    Tobacco: History of tobacco dependence - quit   Alcohol: not currently using    Medication Adherence: no issues found and has assistance setting up med boxes (Keshawn)    Diabetes:  Pt currently taking Lantus 18 units daily, Novolog 7 units with meals BID when not testing BS and uses sliding scale 2 units for every 50 over 201.   SMBG: one to three times daily.   Ranges (based on glucometer readings): fasting high 100s to mid 200s (checks about 1-2 times per week)  Date FBG/ 2hours post Lunch/2hours post Dinner /2hours post bedtime   4/17    355   4/15 241   183   4/14 190  102 103 (thinks she had vertigo on this day and no dinner)   4/13    204   4/12    388   4/11    267   4/10    349      Symptoms of low blood sugar? none. Frequency of hypoglycemia? never.  Recent symptoms of high blood sugar? none  Pt is not taking an ACEi/ARB. (worsening renal function when on ACE/ARB therapy)  Aspirin: Taking 81mg daily and denies side effects  Diet/exercise: has been eating 2 times a day, appetite is normal. Thinks appetite is back to baseline prior to portion restriction. She is bed bound, no exercise.  Pain/neuropathy: has stopped using scheduled tramadol and now taking PRN rarely. Keshawn thinks she took one dose to treat catheter-associated pain a few days ago but otherwise hasn't needed any. She is back to taking gabapentin 900mg BID. Continues to take oxcarbazepine at her usual dose. Both of these medications had been prescribed by neurology and are effective. She does not think they are contributing  to AMS when she becomes sick. She is extremely tired today and difficulty staying awake during visit; had been awake much of the night dealing with catheter leaking. Keshawn has placed a call to RNs to ask for help with catheter.     Current labs include:  BP Readings from Last 3 Encounters:   04/05/17 110/60   03/21/17 98/58   03/17/17 130/60       Lab Results   Component Value Date    A1C 7.4 12/05/2016   .  Lab Results   Component Value Date    CHOL 164 12/05/2016     Lab Results   Component Value Date    TRIG 272 12/05/2016     Lab Results   Component Value Date    HDL 46 12/05/2016     Lab Results   Component Value Date    LDL 64 12/05/2016       Liver Function Studies -   Recent Labs   Lab Test  03/22/17   1130   PROTTOTAL  7.6   ALBUMIN  3.0*   BILITOTAL  0.1*   ALKPHOS  77   AST  25   ALT  29       Lab Results   Component Value Date    UCRR 42 03/18/2014    MICROL 68 06/07/2013    UMALCR 194.29 (H) 06/07/2013       Last Basic Metabolic Panel:  Lab Results   Component Value Date     04/03/2017      Lab Results   Component Value Date    POTASSIUM 4.1 04/03/2017     Lab Results   Component Value Date    CHLORIDE 105 04/03/2017     Lab Results   Component Value Date    BUN 45 04/03/2017     Lab Results   Component Value Date    CR 1.37 04/03/2017     GFR Estimate   Date Value Ref Range Status   04/03/2017 38 (L) >60 mL/min/1.7m2 Final     Comment:     Non  GFR Calc   03/22/2017 27 (L) >60 mL/min/1.7m2 Final     Comment:     Non  GFR Calc   03/15/2017 30 (L) >60 mL/min/1.7m2 Final     Comment:     Non  GFR Calc     GFR Estimate If Black   Date Value Ref Range Status   04/03/2017 46 (L) >60 mL/min/1.7m2 Final     Comment:      GFR Calc   03/22/2017 32 (L) >60 mL/min/1.7m2 Final     Comment:      GFR Calc   03/15/2017 36 (L) >60 mL/min/1.7m2 Final     Comment:      GFR Calc     TSH   Date Value Ref Range Status    03/09/2017 1.20 0.40 - 4.00 mU/L Final   ]    Most Recent Immunizations   Administered Date(s) Administered     Hepatitis B 01/13/2003     Influenza (H1N1) 11/25/2009     Influenza (High Dose) 3 valent vaccine 10/07/2016     Influenza (IIV3) 11/05/2011     Influenza Vaccine IM 3yrs+ 4 Valent IIV4 10/16/2013     Pneumococcal (PCV 13) 12/01/2015     Pneumococcal 23 valent 06/17/2013     TDAP Vaccine (Adacel) 06/17/2013     Zoster vaccine, live 06/17/2013     ASSESSMENT:                                                    Current medications were reviewed today as discussed above.      Medication Adherence: no issues identified    Diabetes: needs improvement. A1c at goal <8%, however BS have continued to increase. Fasting BS not at goal . Pt would benefit from increasing both Lantus and Novolog slightly   Pain/neuropathy: improved. Continue to monitor.     PLAN:                                                      Increase Lantus to 24 units daily  Increase Novolog to 10 units with meals BID. Continue sliding scale at bedtime.    I spent 50 minutes with this patient today.  All changes were made via collaborative practice agreement with Marivel Barcenas. A copy of the visit note was provided to the patient's primary care provider.     Will follow up in 1 month.    The patient was mailed a summary of these recommendations as an after visit summary.    Conchita [place], PharmD, BCACP

## 2017-04-18 NOTE — IP AVS SNAPSHOT
[address] [address]    Phone:  [phone]                                       After Visit Summary   4/18/2017    Gem Jade    MRN: [id number]           After Visit Summary Signature Page     I have received my discharge instructions, and my questions have been answered. I have discussed any challenges I see with this plan with the nurse or doctor.    ..........................................................................................................................................  Patient/Patient Representative Signature      ..........................................................................................................................................  Patient Representative Print Name and Relationship to Patient    ..................................................               ................................................  Date                                            Time    ..........................................................................................................................................  Reviewed by Signature/Title    ...................................................              ..............................................  Date                                                            Time

## 2017-04-18 NOTE — MR AVS SNAPSHOT
After Visit Summary   4/18/2017    Gem Jade    MRN: [id number]           Patient Information     Date Of Birth          1945        Visit Information        Provider Department      4/18/2017 2:00 PM Conchita [place], [facility]        Today's Diagnoses     Diabetes mellitus with background retinopathy    -  1    Diabetic polyneuropathy associated with type 2 diabetes mellitus (H)        Chronic shoulder pain, unspecified laterality          Care Instructions    Recommendations from today's MTM visit:                                                        1. Increase Lantus to 24 units daily    2. Increase Novolog to 10 units with meals      Next MTM visit: 1 month    To schedule another MTM appointment, please call the clinic directly or you may call the MTM scheduling line at [phone] or toll-free at [phone].     My Clinical Pharmacist's contact information:                                                      It was a pleasure seeing you today!  Please feel free to contact me with any questions or concerns you have.      Conchita [place], PharmD, BCACP     You may receive a survey about the MTM services you received.  I would appreciate your feedback to help me serve you better in the future. Please fill it out and return it when you can. Your comments will be anonymous.                  Follow-ups after your visit        Your next 10 appointments already scheduled     May 03, 2017  2:00 PM CDT   Return Visit with ANTHONY Antonio CNP   [facility] ([facility])    [address] [place] [id number] [phone]            May 24, 2017  2:00 PM CDT   Office Visit with Conchita [place] [facility] ([facility])    [address] [place] [address] [phone]           Bring a  current list of meds and any records pertaining to this visit.  For Physicals, please bring immunization records and any forms needing to be filled out.  Please arrive 10 minutes early to complete paperwork.            Jun 07, 2017  2:00 PM CDT   [facility] Visit with ANTHONY Antonio CNP   [facility]    [address]  [address]  [facility] [address] [phone]              Future tests that were ordered for you today     Open Standing Orders        Priority Remaining Interval Expires Ordered    Pulse oximetry continuous by RT Routine 52029/74133 CONTINUOUS  4/19/2017    Glucose monitor nursing POCT Routine 100/100 CONDITIONAL (SPECIFY)  4/19/2017    Glucose monitor nursing POCT Routine 100/100 CONDITIONAL (SPECIFY)  4/19/2017    Notify Provider Routine [id number] PRN  4/19/2017    Glucose monitor nursing POCT- IF PO (eating meals or on bolus enteral feedings), within 30 minutes prior to each meal and at bedtime. Routine 60/63 4 TIMES DAILY BEFORE MEALS & AT BEDTIME  4/19/2017    Activity: Up with assist Routine [id number] PRN  4/19/2017    Basic metabolic panel Routine 1/1 AM DRAW  4/19/2017    CBC with platelets Routine 1/1 AM DRAW  4/19/2017            Who to contact     If you have questions or need follow up information about today's clinic visit or your schedule please contact [facility] directly at [phone].  Normal or non-critical lab and imaging results will be communicated to you by MyChart, letter or phone within 4 business days after the clinic has received the results. If you do not hear from us within 7 days, please contact the clinic through MyChart or phone. If you have a critical or abnormal lab result, we will notify you by phone as soon as possible.  Submit refill requests through [employer] or call your pharmacy and they will forward the refill request to us. Please allow 3 business days  for your refill to be completed.          Additional Information About Your Visit        Care EveryWhere ID     This is your Care EveryWhere ID. This could be used by other organizations to access your [facility] medical records  [id number]         Blood Pressure from Last 3 Encounters:   04/19/17 173/58   04/05/17 110/60   03/21/17 98/58    Weight from Last 3 Encounters:   04/19/17 205 lb 12.8 oz (93.4 kg)   03/15/17 201 lb (91.2 kg)   02/20/17 205 lb 4.8 oz (93.1 kg)              Today, you had the following     No orders found for display         Today's Medication Changes          These changes are accurate as of: 4/18/17 11:15 PM.  If you have any questions, ask your nurse or doctor.               These medicines have changed or have updated prescriptions.        Dose/Directions    LANTUS SOLOSTAR 100 UNIT/ML injection   This may have changed:  additional instructions   Used for:  Diabetes mellitus with background retinopathy (H)   Generic drug:  insulin glargine        Inject 24 units under the skin daily.   Quantity:  15 mL   Refills:  3       NovoLOG FLEXPEN 100 UNIT/ML injection   This may have changed:  additional instructions   Used for:  Diabetes mellitus with background retinopathy (H)   Generic drug:  insulin aspart        Inject 10 units before meals BID. At bedtime use following sliding scale:  150-200 5u 201-250 7u 251-300 9u 301-350 11u 351+ 13u   Quantity:  15 mL   Refills:  0       TraMADol HCl 50 MG Tbdp   Indication:  Moderate to Moderately Severe Pain   This may have changed:  when to take this   Used for:  Chronic shoulder pain, unspecified laterality        Dose:  50 mg   Take 50 mg by mouth 3 times daily as needed   Quantity:  90 tablet   Refills:  3                Primary Care Provider Office Phone # Fax #    ANTHONY Antonio [phone]        [facility] [address] [facility] [address]  [facility] [address]        Thank you!     Thank you for choosing  [facility]  for your care. Our goal is always to provide you with excellent care. Hearing back from our patients is one way we can continue to improve our services. Please take a few minutes to complete the written survey that you may receive in the mail after your visit with us. Thank you!             Your Updated Medication List - Protect others around you: Learn how to safely use, store and throw away your medicines at www.disposemymeds.org.          This list is accurate as of: 4/18/17 11:15 PM.  Always use your most recent med list.                   Brand Name Dispense Instructions for use    * ACE NOT PRESCRIBED (INTENTIONAL)      1 each continuous prn ACE Inhibitor not prescribed due to Worsening renal function on ACE/ARB therapy       ACETAMINOPHEN PO      Take 325-650 mg by mouth every 4 hours as needed.       alcohol swab prep pads     1 Box    Use to swab area of injection/yvon and vials as directed.       ASPIRIN PO      Take 81 mg by mouth daily.       blood glucose monitoring test strip    ACCU-CHEK COMPACT PLUS    100 strip    Use to test blood sugar 3 times daily or as directed.       cloNIDine 0.1 MG tablet    CATAPRES    180 tablet    Take 1 tablet (0.1 mg) by mouth 2 times daily       clotrimazole 1 % cream    LOTRIMIN    40 g    Apply topically 2 times daily Apply to buttocks twice a day until rash resolved.       Cranberry 400 MG Tabs      Take 400 mg by mouth 2 times daily       cyanocobalamin 1000 MCG/ML injection    VITAMIN B12    3 mL    Inject 1 mL (1,000 mcg) into the muscle every 30 days       diphenoxylate-atropine 2.5-0.025 MG per tablet    LOMOTIL    50 tablet    Take 1 tablet by mouth 4 times daily as needed for diarrhea       famotidine 20 MG tablet    PEPCID    60 tablet    Take 1 tablet (20 mg) by mouth 2 times daily       gabapentin 300 MG capsule    NEURONTIN    180 capsule    Take 2 tabs 2x day for one week then increase to 3 tabs 2x daily        GLUCAGON EMERGENCY 1 MG kit   Generic drug:  glucagon     1 each    Inject 1 mg into the muscle once       insulin pen needle 30G X 8 MM     100 each    Use as directed with Lantus       labetalol 200 MG tablet    NORMODYNE    180 tablet    Take 1 tablet (200 mg) by mouth 2 times daily       lactobacillus rhamnosus (GG) capsule     60 capsule    Take 1 capsule by mouth 2 times daily       LANTUS SOLOSTAR 100 UNIT/ML injection   Generic drug:  insulin glargine     15 mL    Inject 24 units under the skin daily.       levothyroxine 50 MCG tablet    SYNTHROID    90 tablet    Take 1 tablet (50 mcg) by mouth daily       loratadine 10 MG tablet    CLARITIN    90 tablet    Take 1 tablet (10 mg) by mouth daily       meclizine 12.5 MG tablet    ANTIVERT    90 tablet    Take 1 tablet (12.5 mg) by mouth 3 times daily       NovoLOG FLEXPEN 100 UNIT/ML injection   Generic drug:  insulin aspart     15 mL    Inject 10 units before meals BID. At bedtime use following sliding scale:  150-200 5u 201-250 7u 251-300 9u 301-350 11u 351+ 13u       NYSTOP 269318 UNIT/GM Powd   Generic drug:  nystatin     60 g    Externally apply 1 g topically 3 times daily as needed       ondansetron 4 MG ODT tab    ZOFRAN ODT    20 tablet    Take 1-2 tablets (4-8 mg) by mouth every 8 hours as needed for nausea       * order for DME     3 catheter    Equipment being ordered: Johnson Catheters - 18 french       * order for DME     100 pad    Equipment being ordered: Blue Perx Please send to [facility]       * order for DME     1 each    Equipment being ordered: Ahuja FX CPAP interface (nasal pillows), size XS.       * order for DME     12 each    Equipment being ordered: Silver Impregnated catheters HX of frequent UTI       OXcarbazepine 300 MG tablet    TRILEPTAL    180 tablet    Take 1 tablet (300 mg) by mouth 2 times daily       phenazopyridine 100 MG tablet    PYRIDIUM    6 tablet    Take 1 tablet (100 mg) by mouth 3 times daily as needed for  "irritation       pravastatin 10 MG tablet    PRAVACHOL    90 tablet    Take 1 tablet (10 mg) by mouth daily       predniSONE 2.5 MG tablet    DELTASONE    30 tablet    Take 1 tablet (2.5 mg) by mouth daily       simethicone 125 MG Chew chewable tablet    MYLICON    120 tablet    Take 1 tablet (125 mg) by mouth as needed for intestinal gas       sirolimus 0.5 MG tablet    RAPAMUNE - GENERIC EQUIVALENT    90 tablet    Take 3 tablets (1.5 mg) by mouth daily       syringe/needle (disp) 22G X 1-1/2\" 3 ML Misc    B-D SYRINGE/NEEDLE    50 each    Inject 1 Syringe into the muscle every 30 days       tacrolimus 1 MG capsule    PROGRAF - GENERIC EQUIVALENT    120 capsule    Take 1 capsule (1 mg) by mouth 2 times daily       TraMADol HCl 50 MG Tbdp     90 tablet    Take 50 mg by mouth 3 times daily as needed       * Notice:  This list has 5 medication(s) that are the same as other medications prescribed for you. Read the directions carefully, and ask your doctor or other care provider to review them with you.      "

## 2017-04-18 NOTE — LETTER
Transition Communication Hand-off for Care Transitions to Next Level of Care Provider    Name: Gem Jade  MRN #: [id number]  Primary Care Provider: Marivel Barcenas NP     Primary Clinic:  [facility] [address] [facility] [address]  [facility] [address]     Reason for Hospitalization:  Kidney replaced by transplant [Z94.0]  Altered mental status, unspecified altered mental status type [R41.82]  Urinary tract infection, site unspecified [N39.0]  Admit Date/Time: 4/18/2017 11:15 PM  Discharge Date: 4/29/17  Payor Source: Payor: MEDICA / Plan: MEDICA PRIME SOLUTION / Product Type: Indemnity /     Reason for Communication Hand-off Referral: Fragility  Multiple providers/specialties    Discharge Plan: home with resumption of home care services       Concern for non-adherence with plan of care:   Y/N no  Discharge Needs Assessment:  Needs       Most Recent Value    Equipment Currently Used at Home lift device, wheelchair    Transportation Available van, wheelchair accessible, family or friend will provide, ambulance    Home Care [facility] [phone], Fax: [phone]          Already enrolled in Tele-monitoring program and name of program:  no  Follow-up specialty is recommended: Yes    Follow-up plan:  Future Appointments  Date Time Provider Department Center   5/3/2017 2:00 PM Marivel Barcenas APRN CNP [facility]   5/24/2017 2:00 PM Conchita [place], [facility]   6/7/2017 2:00 PM Marivel Barcenas APRN CNP [facility]       Any outstanding tests or procedures:        Referrals     Future Labs/Procedures    Home care nursing referral     Comments:    [facility]    RN skilled nursing visit. RN to assess respiratory and cardiac status and hydration, nutrition and bowel status.  Resume previous orders  Home health aide to Resume previous orders.    Silver impregnated rico changes E5xmrol  BMP and tacrolimus labs on Monday 5/3/17.  Notify Sanjana  [phone] from renal clinic with results and she should call patient with updates.    Check BP twice a day, an hour after taking labetalol.    In the morning, if her Systolic BP is > 160, give Amlodipine 5 mg tab.    Please chart her blood pressures twice a day and note if she had received amlodipine that day.    Notify Sanjana from renal clinic with the results of 1 week of blood pressure values and she will determine if amlodipine needs to be scheduled.     Your provider has ordered home care nursing services. If you have not been contacted within 2 days of your discharge please call the inpatient department phone number at [phone] .    Medication Therapy Management Referral     Comments:    Reason for referral:  on more than 5 medications and managing chronic disease and on more than 10 medications and hospitalized or in the ED in the past 6 months     This service is designed to help you get the most from your medications.  A specially trained pharmacist will work closely with you and your doctors  to solve any problems related to your medications and to help you get the   best results from taking them.      The Medication Therapy Management staff will call you to schedule an appointment.            Key Recommendations:  See CATHY Aguilar RN, BSN  Care Coordinator Julia [address] & [address]  Pager: [phone]  Phone: [phone]    AVS/Discharge Summary is the source of truth; this is a helpful guide for improved communication of patient story

## 2017-04-18 NOTE — IP AVS SNAPSHOT
` `     [address] [facility] BANK: [phone]            Medication Administration Report for Gem Jade as of 04/28/17 1630   Legend:    Given Hold Not Given Due Canceled Entry Other Actions    Time Time (Time) Time  Time-Action       Inactive    Active    Linked        Medications 04/22/17 04/23/17 04/24/17 04/25/17 04/26/17 04/27/17 04/28/17    acetaminophen (TYLENOL) tablet 325-650 mg  Dose: 325-650 mg Freq: EVERY 4 HOURS PRN Route: PO  PRN Comment: fever or pain  Start: 04/19/17 0718   Admin Instructions: Maximum acetaminophen dose from all sources = 75 mg/kg/day not to exceed 4 grams/day.               ampicillin (OMNIPEN) 2 g vial to attach to  ml bag  Dose: 2 g Freq: EVERY 8 HOURS Route: IV  Indications of Use: URINARY TRACT INFECTION  Start: 04/24/17 2118   Admin Instructions: Renally dosed as if CrCl 10-29 (max dosed)       2223 (2 g)-New Bag        0514 (2 g)-New Bag       1348 (2 g)-New Bag       2116 (2 g)-New Bag        0521 (2 g)-New Bag       1509 (2 g)-New Bag       2143 (2 g)-New Bag        0541 (2 g)-New Bag       1340 (2 g)-New Bag       2157 (2 g)-New Bag        0530 (2 g)-New Bag       1236 (2 g)-New Bag       [ ] 2130           cinacalcet (SENSIPAR) tablet 30 mg  Dose: 30 mg Freq: DAILY Route: PO  Start: 04/22/17 1145    1226 (30 mg)-Given        0813 (30 mg)-Given        0859 (30 mg)-Given        0836 (30 mg)-Given        0809 (30 mg)-Given        0815 (30 mg)-Given        0827 (30 mg)-Given           famotidine (PEPCID) tablet 20 mg  Dose: 20 mg Freq: DAILY Route: PO  Start: 04/24/17 0800   Admin Instructions: Dose adjusted per renal dosing policy.  Estimated CrCl < 50mL/min       0900 (20 mg)-Given        0836 (20 mg)-Given        0809 (20 mg)-Given        0816 (20 mg)-Given        0826 (20 mg)-Given           glucose 40 % gel 15-30 g  Dose: 15-30 g Freq: EVERY 15 MIN PRN Route: PO  PRN Reason: low blood sugar  Start: 04/19/17 0718   Admin Instructions: Give 15 g for BG 51 to 69  mg/dL IF patient is conscious and able to swallow. Give 30 g for BG less than or equal to 50 mg/dL IF patient is conscious and able to swallow. Do NOT give glucose gel via enteral tube.  IF patient has enteral tube: give apple juice 120 mL (4 oz or 15 g of CHO) via enteral tube for BG 51 to 69 mg/dL.  Give apple juice 240 mL (8 oz or 30 g of CHO) via enteral tube for BG less than or equal to 50 mg/dL.              Or  dextrose 50 % injection 25-50 mL  Dose: 25-50 mL Freq: EVERY 15 MIN PRN Route: IV  PRN Reason: low blood sugar  Start: 04/19/17 0718   Admin Instructions: Use if have IV access, BG less than 70 mg/dL and meet dose criteria below:  Dose if conscious and alert (or disorientated) and NPO = 25 mL  Dose if unconscious / not alert = 50 mL  Vesicant.              Or  glucagon injection 1 mg  Dose: 1 mg Freq: EVERY 15 MIN PRN Route: SC  PRN Reason: low blood sugar  PRN Comment: May repeat x 1 only  Start: 04/19/17 0718   Admin Instructions: May give SQ or IM. IF BG less than or equal to 50 mg/dL and no IV access.  ONLY use glucagon IF patient has NO IV access AND is UNABLE to swallow.               insulin aspart (NovoLOG) inj (RAPID ACTING)  Dose: 1-5 Units Freq: AT BEDTIME Route: SC  Start: 04/19/17 2200   Admin Instructions: MEDIUM INSULIN RESISTANCE DOSING    Do Not give Bedtime Correction Insulin if BG less than  200.   For  - 249 give 1 units.   For  - 299 give 2 units.   For  - 349 give 3 units.   For  -399 give 4 units.   For BG greater than or equal to 400 give 5 units.  Notify provider if glucose greater than or equal to 350 mg/dL after administration of correction dose.  If given at mealtime, must be administered 5 min before meal or immediately after.     (2224)-Not Given [C]        2134 (1 Units)-Given [C]        (2219)-Not Given [C]        (2115)-Not Given [C]        2143 (1 Units)-Given        2159 (1 Units)-Given        [ ] 2200           insulin aspart (NovoLOG) inj  (RAPID ACTING)  Dose: 1-7 Units Freq: 3 TIMES DAILY BEFORE MEALS Route: SC  Start: 04/19/17 0730   Admin Instructions: Correction Scale - MEDIUM INSULIN RESISTANCE DOSING     Do Not give Correction Insulin if Pre-Meal BG less than 140.   For Pre-Meal  - 189 give 1 unit.   For Pre-Meal  - 239 give 2 units.   For Pre-Meal  - 289 give 3 units.   For Pre-Meal  - 339 give 4 units.   For Pre-Meal - 399 give 5 units.   For Pre-Meal -449 give 6 units  For Pre-Meal BG greater than or equal to 450 give 7 units.   To be given with prandial insulin, and based on pre-meal blood glucose.    Notify provider if glucose greater than or equal to 350 mg/dL after administration of correction dose.  If given at mealtime, must be administered 5 min before meal or immediately after.     0754 (2 Units)-Given [C]       1226 (2 Units)-Given       1738 (1 Units)-Given [C]        (0816)-Not Given [C]       (1200)-Not Given       1823 (1 Units)-Given        0901 (2 Units)-Given       (1304)-Not Given       1758 (1 Units)-Given        (0828)-Not Given [C]       1157 (1 Units)-Given [C]       (1806)-Not Given [C]        (0814)-Not Given [C]       (1200)-Not Given [C]       1757 (2 Units)-Given        0818 (2 Units)-Given [C]       1133 (1 Units)-Given [C]       1726 (1 Units)-Given        0827 (1 Units)-Given       1236 (1 Units)-Given       [ ] 1700           insulin glargine (LANTUS) injection 12 Units  Dose: 12 Units Freq: EVERY MORNING BEFORE BREAKFAST Route: SC  Start: 04/21/17 1015    2309 (12 Units)-Given        2208 (12 Units)-Given        2224 (12 Units)-Given        2116 (12 Units)-Given               2144 (12 Units)-Given                0027 (12 Units)-Given           labetalol (NORMODYNE) tablet 300 mg  Dose: 300 mg Freq: 2 TIMES DAILY Route: PO  Start: 04/27/17 0800         0930-Hold [C]       2158 (300 mg)-Given        0827 (300 mg)-Given       [ ] 2000           levothyroxine  (SYNTHROID/LEVOTHROID) tablet 50 mcg  Dose: 50 mcg Freq: DAILY Route: PO  Start: 04/19/17 0800    0755 (50 mcg)-Given        0813 (50 mcg)-Given        0900 (50 mcg)-Given        0836 (50 mcg)-Given        0809 (50 mcg)-Given        0816 (50 mcg)-Given        0828 (50 mcg)-Given           losartan (COZAAR) tablet 25 mg  Dose: 25 mg Freq: DAILY Route: PO  Start: 04/28/17 1145          1448 (25 mg)-Given           meclizine (ANTIVERT) tablet 12.5 mg  Dose: 12.5 mg Freq: 3 TIMES DAILY PRN Route: PO  PRN Reason: dizziness  Start: 04/28/17 1241          1543 (12.5 mg)-Given           naloxone (NARCAN) injection 0.1-0.4 mg  Dose: 0.1-0.4 mg Freq: EVERY 2 MIN PRN Route: IV  PRN Reason: opioid reversal  Start: 04/19/17 0718   Admin Instructions: For respiratory rate LESS than or EQUAL to 8.  Partial reversal dose:  0.1 mg titrated q 2 minutes for Analgesia Side Effects Monitoring Sedation Level of 3 (frequently drowsy, arousable, drifts to sleep during conversation).Full reversal dose:  0.4 mg bolus for Analgesia Side Effects Monitoring Sedation Level of 4 (somnolent, minimal or no response to stimulation).               ondansetron (ZOFRAN-ODT) ODT tab 4 mg  Dose: 4 mg Freq: EVERY 6 HOURS PRN Route: PO  PRN Reason: nausea  Start: 04/19/17 0718   Admin Instructions: This is Step 1 of nausea and vomiting management.  If nausea not resolved in 15 minutes, go to Step 2 prochlorperazine (COMPAZINE). Do not push through foil backing. Peel back foil and gently remove. Place on tongue immediately. Administration with liquid unnecessary              Or  ondansetron (ZOFRAN) injection 4 mg  Dose: 4 mg Freq: EVERY 6 HOURS PRN Route: IV  PRN Reasons: nausea,vomiting  Start: 04/19/17 0718   Admin Instructions: This is Step 1 of nausea and vomiting management.  If nausea not resolved in 15 minutes, go to Step 2 prochlorperazine (COMPAZINE).  Irritant.               polyethylene glycol (MIRALAX/GLYCOLAX) Packet 17 g  Dose: 17 g Freq:  DAILY PRN Route: PO  PRN Reason: constipation  Start: 04/22/17 0812   Admin Instructions: Use 1st for constipation  1 Packet = 17 grams. Mixed prescribed dose in 8 ounces of water. Follow with 8 oz. of water.     1444 (17 g)-Given                 pravastatin (PRAVACHOL) tablet 10 mg  Dose: 10 mg Freq: DAILY Route: PO  Start: 04/19/17 0800    0755 (10 mg)-Given        0813 (10 mg)-Given        0859 (10 mg)-Given        0835 (10 mg)-Given        0811 (10 mg)-Given        0816 (10 mg)-Given        0826 (10 mg)-Given           sennosides (SENOKOT) tablet 8.6 mg  Dose: 8.6 mg Freq: 2 TIMES DAILY PRN Route: PO  PRN Reason: constipation  Start: 04/22/17 0812   Admin Instructions: Use 2nd for constipation               tacrolimus (PROGRAF - GENERIC EQUIVALENT) capsule 2 mg  Dose: 2 mg Freq: 2 TIMES DAILY. Route: PO  Start: 04/28/17 1800   Admin Instructions: Check if BLOOD LEVEL is needed BEFORE administering dose.  This order specifically allows the use of a generic equivalent of tacrolimus (PROGRAF) capsules.           [ ] 1800          Future Medications  Medications 04/22/17 04/23/17 04/24/17 04/25/17 04/26/17 04/27/17 04/28/17       amLODIPine (NORVASC) tablet 5 mg  Dose: 5 mg Freq: DAILY Route: PO  Start: 04/29/17 0800              predniSONE (DELTASONE) tablet 5 mg  Dose: 5 mg Freq: DAILY Route: PO  Start: 04/29/17 0800             Completed Medications  Medications 04/22/17 04/23/17 04/24/17 04/25/17 04/26/17 04/27/17 04/28/17         Dose: 10 mg Freq: ONCE Route: IV  Start: 04/27/17 1230   End: 04/27/17 1726         1726 (10 mg)-Given              Dose: 1-30 mL Freq: ONCE PRN Route: ID  PRN Comment: for site local anesthesic  Start: 04/27/17 0948   End: 04/27/17 1030   Admin Instructions: Dose to be divided into smaller volumes appropriate for the procedure. Provider to administer intradermally          1030 (10 mL)-Given by Other Clinician           Discontinued Medications  Medications 04/22/17 04/23/17 04/24/17  04/25/17 04/26/17 04/27/17 04/28/17         Dose: 10 mg Freq: DAILY Route: PO  Start: 04/26/17 0800   End: 04/28/17 1144        0811 (10 mg)-Given        0814 (10 mg)-Given        0827 (10 mg)-Given       1144-Med Discontinued         Dose: 0.05 mg Freq: 2 TIMES DAILY Route: PO  Start: 04/27/17 0800   End: 04/28/17 1144         0929-Hold [C]       1137 (0.05 mg)-Given       2158 (0.05 mg)-Given        0826 (0.05 mg)-Given       1144-Med Discontinued         Dose: 0.05 mg Freq: 2 TIMES DAILY Route: PO  Start: 04/25/17 2000   End: 04/26/17 1122       2115 (0.05 mg)-Given        0810 (0.05 mg)-Given       1122-Med Discontinued           Dose: 0.1 mg Freq: 2 TIMES DAILY Route: PO  Start: 04/26/17 2000   End: 04/27/17 0749        2142 (0.1 mg)-Given        0749-Med Discontinued          Dose: 25-50 mcg Freq: EVERY 5 MIN PRN Route: IV  PRN Reason: severe pain  PRN Comment: If inadequate response may repeat 25 mcg IV slowly Q 5 min PRN severe pain  Start: 04/27/17 0948   End: 04/27/17 1041   Admin Instructions: Doses can be exceeded under direct oversight of patient by physician.          1029 (75 mcg)-Given       1041-Med Discontinued          Dose: 0.2 mg Freq: EVERY 1 MIN PRN Route: IV  PRN Reason: benzodiazepine reversal  PRN Comment: If inadequate response after 45 seconds, may repeat 0.2 mg IV Q 1 minute PRN oversedation.  Start: 04/27/17 0948   End: 04/27/17 1041   Admin Instructions: Give over 15 seconds.  Maximum total dose of 1 mg.  Continue monitoring until discharge criteria met for a minimum of 2 hours.  Irritant.          1041-Med Discontinued          Dose: 200 mg Freq: 2 TIMES DAILY Route: PO  Start: 04/19/17 0800   End: 04/27/17 0749    0754 (200 mg)-Given       2036 (200 mg)-Given        0813 (200 mg)-Given       2132 (200 mg)-Given        0900 (200 mg)-Given       1908 (200 mg)-Given        0836 (200 mg)-Given       2115 (200 mg)-Given        0811 (200 mg)-Given       2143 (200 mg)-Given        0749-Med  Discontinued          Dose: 0.5-1 mg Freq: EVERY 4 MIN PRN Route: IV  PRN Reason: sedation  PRN Comment: If inadequate response may repeat 0.5 mg IV slowly Q 4 minutes PRN sedation until desired response.  Start: 04/27/17 0948   End: 04/27/17 1041   Admin Instructions: Doses can be exceeded under direct oversight of patient by physician.          1028 (1.5 mg)-Given       1041-Med Discontinued          Dose: 0.1-0.4 mg Freq: EVERY 2 MIN PRN Route: IV  PRN Reason: opioid reversal  Start: 04/27/17 0948   End: 04/27/17 1041   Admin Instructions: For respiratory rate LESS than or EQUAL to 8.  Partial reversal dose:  0.1 mg titrated q 2 minutes for Analgesia Side Effects Monitoring Sedation Level of 3 (frequently drowsy, arousable, drifts to sleep during conversation).Full reversal dose:  0.4 mg bolus for Analgesia Side Effects Monitoring Sedation Level of 4 (somnolent, minimal or no response to stimulation).          1041-Med Discontinued          Dose: 2.5 mg Freq: DAILY Route: PO  Start: 04/19/17 0800   End: 04/28/17 1144    0755 (2.5 mg)-Given        0813 (2.5 mg)-Given        0859 (2.5 mg)-Given        0836 (2.5 mg)-Given        0811 (2.5 mg)-Given        0816 (2.5 mg)-Given        0827 (2.5 mg)-Given       1144-Med Discontinued         Dose: 1.5 mg Freq: DAILY Route: PO  Start: 04/19/17 0800   End: 04/28/17 1144    0754 (1.5 mg)-Given        0816 (1.5 mg)-Given        0856 (1.5 mg)-Given        0836 (1.5 mg)-Given        0810 (1.5 mg)-Given        0814 (1.5 mg)-Given        0827 (1.5 mg)-Given       1144-Med Discontinued         Dose: 1.5 mg Freq: 2 TIMES DAILY. Route: PO  Start: 04/25/17 1800   End: 04/28/17 1144   Admin Instructions: Check if BLOOD LEVEL is needed BEFORE administering dose.  This order specifically allows the use of a generic equivalent of tacrolimus (PROGRAF) capsules.        1811 (1.5 mg)-Given        0810 (1.5 mg)-Given       1758 (1.5 mg)-Given        0816 (1.5 mg)-Given       1911 (1.5  mg)-Given        0827 (1.5 mg)-Given       1144-Med Discontinued    Medications 04/22/17 04/23/17 04/24/17 04/25/17 04/26/17 04/27/17 04/28/17

## 2017-04-18 NOTE — IP AVS SNAPSHOT
"    [address] [facility]: [phone]                                              INTERAGENCY TRANSFER FORM - PHYSICIAN ORDERS   2017                    Hospital Admission Date: 2017  MARYJANE SHAVER   : 1945  Sex: Female        Attending Provider: Christiano Webber MD     Allergies:  Bactrim Ds [Sulfamethoxazole W/trimethoprim], Atorvastatin Calcium, Codeine, Darvocet [Propoxyphene N-apap], Hydromorphone, Levofloxacin, Morphine, Niaspan [Niacin], Percocet [Oxycodone-acetaminophen], Vicodin [Hydrocodone-acetaminophen]    Infection:  ESBL, VRE-Contact Isolation   Service:  INTERNAL MED    Ht:  1.702 m (5' 7.01\")   Wt:  90.5 kg (199 lb 8 oz)   Admission Wt:  93.4 kg (205 lb 12.8 oz)    BMI:  31.24 kg/m 2   BSA:  2.07 m 2            Patient PCP Information     Provider PCP Type    Marivel Barcenas, NP, APRN CNP General      ED Clinical Impression     Diagnosis Description Comment Added By Time Added    Altered mental status, unspecified altered mental status type [R41.82] Altered mental status, unspecified altered mental status type [R41.82]  Marielle Maldonado MD 2017  1:10 AM    Urinary tract infection, site unspecified [N39.0] Urinary tract infection, site unspecified [N39.0]  Marielle Maldonado MD 2017  1:11 AM    Kidney replaced by transplant [Z94.0] Kidney replaced by transplant [Z94.0]  Marielle Maldonado MD 2017  2:00 AM      Hospital Problems as of 2017              Priority Class Noted POA    Kidney replaced by transplant   2002 Yes    Altered mental status Medium  2016 Yes    UTI (urinary tract infection) Medium  2017 Yes    Enterococcus faecalis infection Medium  2017 Yes      Non-Hospital Problems as of 2017              Priority Class Noted    Primary localized osteoarthrosis, lower leg   2003    Pulmonary embolism and infarction (H)   2003    Diabetes mellitus with background retinopathy (H)   2/10/2006    TYPE 2 DIABETES, " HBA1C GOAL < 8%   10/31/2010    HYPERLIPIDEMIA LDL GOAL <100   10/31/2010    Diabetic gastroparesis (H)   2/14/2011    Back pain   12/9/2011    Compression fx, thoracic spine (H)   12/9/2011    Hyperkalemia   8/6/2012    Anemia   8/7/2012    Clostridium difficile diarrhea   8/7/2012    Hydronephrosis   10/10/2012    Recurrent UTI   3/20/2013    Benign essential hypertension   4/15/2013    Hyponatremia   6/29/2013    Vancomycin resistant Enterococcus   7/1/2013    Clostridium difficile colitis   8/9/2013    Diabetic polyneuropathy associated with type 2 diabetes mellitus (H) Medium  10/9/2015    Chronic shoulder pain, unspecified laterality Medium  10/9/2015    Morbid obesity, unspecified obesity type (H) Medium  2/10/2016    Health Care Home   2/17/2016    Gastroenteritis Medium  5/26/2016    Atrial fibrillation, transient (H) Medium  6/6/2016    Acute kidney injury (H) Medium  9/9/2016    Pancytopenia, acquired (H) Medium  9/9/2016    Obstructive sleep apnea syndrome   9/9/2016    ROB (acute kidney injury) (H) Medium  3/8/2017      Code Status History     Date Active Date Inactive Code Status Order ID Comments User Context    4/28/2017  1:33 PM  Full Code [id number]  Miguelito Green MD Outpatient    4/19/2017  4:32 AM 4/28/2017  1:33 PM Full Code [id number]  Usha Herrera MD ED    3/15/2017  9:24 AM 4/19/2017  4:32 AM Full Code [id number]  Alexi Richard MD Outpatient    3/15/2017  7:38 AM 3/15/2017  9:24 AM Full Code [id number]  Sheila Armstrong MD Outpatient    3/8/2017  7:11 PM 3/15/2017  7:38 AM Full Code [id number]  Marvin Turk MD Inpatient    2/20/2017  3:25 PM 3/8/2017  7:11 PM Full Code [id number]  Dennise Barrientos MD Outpatient    2/17/2017  4:05 AM 2/20/2017  3:25 PM Full Code [id number]  Natalia Drew MD Inpatient    9/5/2016 11:30 AM 2/17/2017  4:05 AM Full Code [id number]  Sloan Fajardo MD [facility]    8/30/2016  3:46 AM 9/5/2016 11:30 AM Full Code [id number]  Afshin Emery  MD [facility]    8/10/2016 11:56 AM 8/30/2016  3:46 AM Full Code [id number]  Barb Gordon APRN [facility] Outpatient    8/7/2016  1:58 AM 8/10/2016 11:56 AM Full Code [id number]  Roni Tobar MD ED    5/31/2016 12:27 PM 8/7/2016  1:58 AM Full Code [id number]  She Siddiqui MD Outpatient    5/27/2016 12:54 PM 5/31/2016 12:27 PM Full Code [id number]  Dajuan Sanchez MD Inpatient    5/26/2016  3:50 AM 5/27/2016 12:54 PM Full Code [id number]  Katja Garcia PA Inpatient    1/13/2016  2:14 PM 5/26/2016  3:50 AM Full Code [id number]  Victoriano Keller MD Outpatient    1/10/2016  5:33 PM 1/13/2016  2:14 PM Full Code [id number]  Sharri Lujan MD Inpatient    12/18/2015  5:41 PM 12/22/2015  3:08 PM Full Code [id number]  Izabella Hearn MD Inpatient    8/27/2015  7:45 AM 12/18/2015  5:41 PM Full Code [id number]  Enoc Gray MD Outpatient    8/24/2015  3:18 AM 8/27/2015  7:45 AM Full Code [id number]  Inder Arita MD Inpatient    3/23/2014  8:50 AM 8/24/2015  3:18 AM Full Code [id number]  Janki Marin MD Outpatient    3/20/2014  8:20 AM 3/23/2014  8:50 AM Full Code [id number]  Victoriano Keller MD Inpatient    9/5/2013  9:20 AM 3/20/2014  8:20 AM Full Code [id number]  Pawel Vides MD Outpatient    9/2/2013  5:11 AM 9/5/2013  9:20 AM Full Code [id number]  Terence Austin MD Inpatient    8/11/2013  9:34 AM 9/2/2013  5:11 AM Full Code [id number]  Janki Marin MD Outpatient    8/9/2013 11:19 PM 8/11/2013  9:34 AM Full Code [id number]  Taqueria Barkley, SEKOU Inpatient    6/29/2013  7:44 PM 7/2/2013  3:26 PM Full Code [id number]  Saw Ohara, RN Inpatient    6/21/2013  5:11 PM 6/29/2013  7:44 PM Full Code [id number]  Ren Young PA Outpatient    6/19/2013  7:38 AM 6/21/2013  5:11 PM Full Code [id number]  Kimberly Macias, SEKOU Inpatient    5/22/2013  3:12 PM 6/19/2013  7:38 AM Full Code [id number]  Yandy Salgado MD Outpatient    5/19/2013 12:19 PM 5/22/2013  3:12 PM Full Code  [id number]  Amanda Oreilly, RN Inpatient    10/10/2012  9:28 PM 10/12/2012  5:36 PM Full Code [id number]  Taya Goodson, RN Inpatient    10/9/2012  9:40 PM 10/10/2012  9:28 PM Full Code [id number]  Zoila Marcus, SEKOU Inpatient    9/12/2012 12:45 PM 9/15/2012 10:05 PM Full Code [id number]  Holly Isaacs, RN Inpatient    8/20/2012 11:09 AM 9/12/2012 12:45 PM Full Code [id number]  Izabella Hearn MD Outpatient    8/17/2012 12:32 AM 8/20/2012 11:09 AM Full Code [id number]  Omari Hernandez, SEKOU Inpatient    8/9/2012 11:13 AM 8/17/2012 12:32 AM Full Code [id number]  Bj Richmond MD Outpatient    8/7/2012 12:17 AM 8/9/2012 11:13 AM Full Code [id number]  Terence Jeffrey MD Inpatient    7/13/2012  9:07 PM 7/29/2012  8:57 PM Full Code [id number]  Nuria Goldstein, SEKOU Inpatient    3/26/2012  5:22 PM 3/30/2012  6:49 PM Full Code [id number]  Saw Ohara, SEKOU Inpatient    1/20/2012  5:27 PM 3/26/2012  5:22 PM Full Code [id number]  Jessica Shultz MD Outpatient    1/15/2012  8:49 AM 1/20/2012  5:27 PM Full Code [id number]  Santosh Gordon DO Inpatient    1/2/2012  2:32 PM 1/15/2012  8:49 AM Full Code [id number]  Janki Marin MD Outpatient    1/1/2012 10:28 AM 1/2/2012  2:32 PM Full Code [id number]  Janki Marin MD Outpatient    12/28/2011  3:33 PM 1/1/2012 10:28 AM Full Code [id number]  Elsi Fierro, SEKOU Inpatient    12/9/2011 12:21 PM 12/28/2011  3:33 PM Full Code [id number]  Terence Austin MD Outpatient    12/5/2011  7:34 PM 12/9/2011 12:21 PM Full Code [id number]  Chapo Humphrey MD Inpatient         Medication Review      START taking        Dose / Directions Comments    amLODIPine 5 MG tablet   Commonly known as:  NORVASC   Used for:  Kidney replaced by transplant, Benign essential hypertension        Dose:  5 mg   Take 1 tablet (5 mg) by mouth daily as needed For systolic blood pressure > 160 when checked 1 hour after taking labetalol.   Quantity:  90 tablet   Refills:  3         amoxicillin 500 MG capsule   Commonly known as:  AMOXIL   Used for:  Recurrent UTI        Dose:  500 mg   Take 1 capsule (500 mg) by mouth 3 times daily   Quantity:  18 capsule   Refills:  0        cinacalcet 30 MG tablet   Commonly known as:  SENSIPAR   Used for:  Kidney replaced by transplant, Hypercalcemia        Dose:  30 mg   Take 1 tablet (30 mg) by mouth daily   Quantity:  60 tablet   Refills:  3        losartan 25 MG tablet   Commonly known as:  COZAAR   Used for:  Kidney replaced by transplant, Benign essential hypertension        Dose:  25 mg   Take 1 tablet (25 mg) by mouth daily   Quantity:  90 tablet   Refills:  3        * tacrolimus 0.5 MG capsule   Commonly known as:  PROGRAF - GENERIC EQUIVALENT   Used for:  Kidney transplant recipient   Replaces:  tacrolimus 1 MG capsule        Dose:  0.5 mg   Take 1 capsule (0.5 mg) by mouth 2 times daily   Quantity:  180 capsule   Refills:  3        * tacrolimus 1 MG capsule   Commonly known as:  PROGRAF - GENERIC EQUIVALENT   Used for:  Kidney replaced by transplant        Dose:  1 mg   Take 1 capsule (1 mg) by mouth 2 times daily   Quantity:  180 capsule   Refills:  3        * Notice:  This list has 2 medication(s) that are the same as other medications prescribed for you. Read the directions carefully, and ask your doctor or other care provider to review them with you.      CONTINUE these medications which may have CHANGED, or have new prescriptions. If we are uncertain of the size of tablets/capsules you have at home, strength may be listed as something that might have changed.        Dose / Directions Comments    aspirin 81 MG chewable tablet   This may have changed:    - medication strength  - additional instructions   Used for:  Pulmonary embolism and infarction (H), Diabetes mellitus with background retinopathy (H)        Dose:  81 mg   Start taking on:  5/4/2017   Take 1 tablet (81 mg) by mouth daily Starting 1 week after your kidney biopsy   Quantity:   90 tablet   Refills:  3        gabapentin 300 MG capsule   Commonly known as:  NEURONTIN   This may have changed:    - how much to take  - how to take this  - when to take this  - reasons to take this  - additional instructions   Used for:  Diabetic polyneuropathy associated with type 2 diabetes mellitus (H)        Dose:  300-600 mg   Take 1-2 capsules (300-600 mg) by mouth 3 times daily as needed For pain   Quantity:  180 capsule   Refills:  3        insulin glargine 100 UNIT/ML injection   Commonly known as:  LANTUS SOLOSTAR   This may have changed:  additional instructions   Used for:  Diabetes mellitus with background retinopathy (H)        Inject 16 units under the skin daily.   Quantity:  15 mL   Refills:  3        labetalol 300 MG tablet   Commonly known as:  NORMODYNE   This may have changed:    - medication strength  - how much to take   Used for:  Benign essential hypertension, Kidney replaced by transplant        Dose:  300 mg   Take 1 tablet (300 mg) by mouth 2 times daily   Quantity:  120 tablet   Refills:  3        order for DME   This may have changed:  Another medication with the same name was removed. Continue taking this medication, and follow the directions you see here.   Used for:  Johnson catheter status        Equipment being ordered: Johnson Catheters - 18 french   Quantity:  3 catheter   Refills:  PRN        OXcarbazepine 300 MG tablet   Commonly known as:  TRILEPTAL   This may have changed:    - when to take this  - reasons to take this  - additional instructions   Used for:  Diabetic polyneuropathy associated with diabetes mellitus due to underlying condition (H)        Dose:  300 mg   Take 1 tablet (300 mg) by mouth 2 times daily as needed For pain   Quantity:  180 tablet   Refills:  3        predniSONE 5 MG tablet   Commonly known as:  DELTASONE   This may have changed:    - medication strength  - how much to take   Used for:  Kidney replaced by transplant        Dose:  5 mg   Take 1 tablet  (5 mg) by mouth daily   Quantity:  90 tablet   Refills:  3        TraMADol HCl 50 MG Tbdp   Indication:  Moderate to Moderately Severe Pain   This may have changed:  when to take this   Used for:  Chronic shoulder pain, unspecified laterality        Dose:  50 mg   Take 50 mg by mouth 3 times daily as needed   Quantity:  90 tablet   Refills:  3          CONTINUE these medications which have NOT CHANGED        Dose / Directions Comments    ACETAMINOPHEN PO        Dose:  325-650 mg   Take 325-650 mg by mouth every 4 hours as needed.   Refills:  0        alcohol swab prep pads   Used for:  Type 2 diabetes, HbA1C goal < 8% (H)        Use to swab area of injection/yvon and vials as directed.   Quantity:  1 Box   Refills:  12        blood glucose monitoring test strip   Commonly known as:  ACCU-CHEK COMPACT PLUS   Used for:  Type 2 diabetes mellitus with diabetic polyneuropathy, with long-term current use of insulin (H)        Use to test blood sugar 3 times daily or as directed.   Quantity:  100 strip   Refills:  11        clotrimazole 1 % cream   Commonly known as:  LOTRIMIN        Apply topically 2 times daily Apply to buttocks twice a day until rash resolved.   Quantity:  40 g   Refills:  0        Cranberry 400 MG Tabs        Dose:  400 mg   Take 400 mg by mouth 2 times daily   Refills:  0        cyanocobalamin 1000 MCG/ML injection   Commonly known as:  VITAMIN B12   Used for:  Iron deficiency anemia, unspecified iron deficiency anemia type        Dose:  1000 mcg   Inject 1 mL (1,000 mcg) into the muscle every 30 days   Quantity:  3 mL   Refills:  3        diphenoxylate-atropine 2.5-0.025 MG per tablet   Commonly known as:  LOMOTIL   Used for:  Diarrhea of presumed infectious origin        Dose:  1 tablet   Take 1 tablet by mouth 4 times daily as needed for diarrhea   Quantity:  50 tablet   Refills:  3        famotidine 20 MG tablet   Commonly known as:  PEPCID        Dose:  20 mg   Take 1 tablet (20 mg) by mouth 2  times daily   Quantity:  60 tablet   Refills:  1        GLUCAGON EMERGENCY 1 MG kit   Used for:  Hypoglycemia   Generic drug:  glucagon        Dose:  1 mg   Inject 1 mg into the muscle once   Quantity:  1 each   Refills:  11        insulin pen needle 30G X 8 MM   Used for:  Type 2 diabetes mellitus with diabetic nephropathy (H)        Use as directed with Lantus   Quantity:  100 each   Refills:  3        lactobacillus rhamnosus (GG) capsule   Used for:  Diarrhea        Dose:  1 capsule   Take 1 capsule by mouth 2 times daily   Quantity:  60 capsule   Refills:  11        levothyroxine 50 MCG tablet   Commonly known as:  SYNTHROID   Used for:  Other specified hypothyroidism        Dose:  50 mcg   Take 1 tablet (50 mcg) by mouth daily   Quantity:  90 tablet   Refills:  3        loratadine 10 MG tablet   Commonly known as:  CLARITIN   Used for:  Acute nasopharyngitis        Dose:  10 mg   Take 1 tablet (10 mg) by mouth daily   Quantity:  90 tablet   Refills:  3        meclizine 12.5 MG tablet   Commonly known as:  ANTIVERT        Dose:  12.5 mg   Take 1 tablet (12.5 mg) by mouth 3 times daily   Quantity:  90 tablet   Refills:  1        NovoLOG FLEXPEN 100 UNIT/ML injection   Used for:  Diabetes mellitus with background retinopathy (H)   Generic drug:  insulin aspart        Inject 10 units before meals BID. At bedtime use following sliding scale:  150-200 5u 201-250 7u 251-300 9u 301-350 11u 351+ 13u   Quantity:  15 mL   Refills:  0        NYSTOP [id number] UNIT/GM Powd   Used for:  Fungal infection   Generic drug:  nystatin        Dose:  1 g   Externally apply 1 g topically 3 times daily as needed   Quantity:  60 g   Refills:  6        ondansetron 4 MG ODT tab   Commonly known as:  ZOFRAN ODT   Used for:  Nausea        Dose:  4-8 mg   Take 1-2 tablets (4-8 mg) by mouth every 8 hours as needed for nausea   Quantity:  20 tablet   Refills:  5        * order for DME   Used for:  Fecal incontinence        Equipment being  "ordered: Blue Chux Please send to [facility]   Quantity:  100 pad   Refills:  3        * order for DME   Used for:  Obstructive sleep apnea syndrome        Equipment being ordered: Ahuja FX CPAP interface (nasal pillows), size XS.   Quantity:  1 each   Refills:  1        * order for DME   Used for:  Recurrent UTI        Equipment being ordered: Silver Impregnated catheters HX of frequent UTI   Quantity:  12 each   Refills:  1        pravastatin 10 MG tablet   Commonly known as:  PRAVACHOL   Used for:  Hyperlipidemia LDL goal <100        Dose:  10 mg   Take 1 tablet (10 mg) by mouth daily   Quantity:  90 tablet   Refills:  3        simethicone 125 MG Chew chewable tablet   Commonly known as:  MYLICON        Dose:  125 mg   Take 1 tablet (125 mg) by mouth as needed for intestinal gas   Quantity:  120 tablet   Refills:  0        syringe/needle (disp) 22G X 1-1/2\" 3 ML Misc   Commonly known as:  B-D SYRINGE/NEEDLE   Used for:  Iron deficiency anemia, unspecified iron deficiency anemia type        Dose:  1 Syringe   Inject 1 Syringe into the muscle every 30 days   Quantity:  50 each   Refills:  0        * Notice:  This list has 3 medication(s) that are the same as other medications prescribed for you. Read the directions carefully, and ask your doctor or other care provider to review them with you.      STOP taking     cloNIDine 0.1 MG tablet   Commonly known as:  CATAPRES           sirolimus 0.5 MG tablet   Commonly known as:  RAPAMUNE - GENERIC EQUIVALENT           tacrolimus 1 MG capsule   Commonly known as:  PROGRAF - GENERIC EQUIVALENT   Replaced by:  tacrolimus 0.5 MG capsule                     Further instructions from your care team       Check BP twice a day, an hour after taking labetalol.    In the morning, if her Systolic BP is > 160, give Amlodipine 5 mg tab.    Please chart her blood pressures twice a day and note if she had received amlodipine that day.    Notify Sanjana from renal clinic with the " results of 1 week of blood pressure values and she will determine if amlodipine needs to be scheduled.     Summary of Visit     Reason for your hospital stay       You were admitted to the hospital for a urinary tract infection.  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             After Care     Activity       Your activity upon discharge: activity as tolerated       Diet       Follow this diet upon discharge: Orders Placed This Encounter      Room Service      Snacks/Supplements Adult: Glucerna (Adult); Between Meals      Regular Diet Adult - Low potassium       Discharge Instructions       Check your blood pressure 2 times daily, an hour after taking labetalol.   In the morning, if blood pressure is > 160, take the amlodipine medication.   Record blood pressure values and  If needing to take amlodipine in the morning.  Renal clinic will call in 1 week to follow up on blood pressure values.       Tubes and drains       You are going home with the following tubes or drains: silver impregnated rico catheter.  Replace with silver impregnated rico catheters every 2-3 weeks.             Referrals     Home care nursing referral       [facility]    RN skilled nursing visit. RN to assess respiratory and cardiac status and hydration, nutrition and bowel status.  Resume previous orders  Home health aide to Resume previous orders.    Silver impregnated rico changes [id number]  BMP and tacrolimus labs on Monday 5/1/17.  Notify Sanjana [phone] from renal clinic with results and she should call patient with updates.    Check BP twice a day, an hour after taking labetalol.    In the morning, if her Systolic BP is > 160, give Amlodipine 5 mg tab.    Please chart her blood pressures twice a day and note if she had received amlodipine that day.    Notify Sanjana from renal clinic with the results of 1 week of blood pressure values and she will  determine if amlodipine needs to be scheduled.     Your provider has ordered home care nursing services. If you have not been contacted within 2 days of your discharge please call the inpatient department phone number at [phone] .       Medication Therapy Management Referral       Reason for referral:  on more than 5 medications and managing chronic disease and on more than 10 medications and hospitalized or in the ED in the past 6 months     This service is designed to help you get the most from your medications.  A specially trained pharmacist will work closely with you and your doctors  to solve any problems related to your medications and to help you get the   best results from taking them.      The Medication Therapy Management staff will call you to schedule an appointment.             Lab Orders     Basic metabolic panel           Tacrolimus level       Must be a trough level.             Your next 10 appointments already scheduled     May 03, 2017  2:00 PM CDT   [facility] with ANTHONY Antonio CNP   [facility]    [address] [place] [address]  [address] [place] [id number] [phone]            May 24, 2017  2:00 PM CDT   Office Visit with Conchita Toledo [facility] ([facility])    [address] [place] [address]  [address] [place] [id number] [phone]           Bring a current list of meds and any records pertaining to this visit.  For Physicals, please bring immunization records and any forms needing to be filled out.  Please arrive 10 minutes early to complete paperwork.            Jun 07, 2017  2:00 PM CDT   Return Visit with ANTHONY Antonio CNP   [facility]    [address] [place] [address]  [address] [place] [id number] [phone]              Follow-Up Appointment Instructions     Future Labs/Procedures     Adult [facility] Follow-up and recommended labs and tests     Comments:    Follow up with primary care provider, Marivel Barcenas NP, within 7 days to evaluate medication change, for hospital follow- up and regarding new diagnosis.  The following labs/tests are recommended: Follow up BMP and Tacrolimus level that will be drawn on Monday 5/1.  Appointment already scheduled for Wednesday 5/3/17 at 2 pm, home visit.     [facility] to call in 1 week to follow up on blood pressure and lab results.       Appointments on [place] and/or [facility] (with [facility] or [facility] provider or service). Call [phone] if you haven't heard regarding these appointments within 7 days of discharge.      Follow-Up Appointment Instructions     Adult [facility] Follow-up and recommended labs and tests       Follow up with primary care provider, Marivel Barcenas NP, within 7 days to evaluate medication change, for hospital follow- up and regarding new diagnosis.  The following labs/tests are recommended: Follow up BMP and Tacrolimus level that will be drawn on Monday 5/1.  Appointment already scheduled for Wednesday 5/3/17 at 2 pm, home visit.     [facility] to call in 1 week to follow up on blood pressure and lab results.       Appointments on [place] and/or [facility] (with [facility] or [facility] provider or service). Call [phone] if you haven't heard regarding these appointments within 7 days of discharge.             Statement of Approval     Ordered          04/28/17 1616  I have reviewed and agree with all the recommendations and orders detailed in this document.  EFFECTIVE NOW     Approved and electronically signed by:  Miguelito Green MD

## 2017-04-19 LAB
ALBUMIN SERPL-MCNC: 3.1 G/DL (ref 3.4–5)
ALBUMIN UR-MCNC: 100 MG/DL
ALP SERPL-CCNC: 101 U/L (ref 40–150)
ALT SERPL W P-5'-P-CCNC: 44 U/L (ref 0–50)
AMMONIA PLAS-SCNC: 25 UMOL/L (ref 10–50)
ANION GAP SERPL CALCULATED.3IONS-SCNC: 7 MMOL/L (ref 3–14)
APPEARANCE UR: ABNORMAL
AST SERPL W P-5'-P-CCNC: 24 U/L (ref 0–45)
BASOPHILS # BLD AUTO: 0 10E9/L (ref 0–0.2)
BASOPHILS NFR BLD AUTO: 0.2 %
BILIRUB SERPL-MCNC: 0.2 MG/DL (ref 0.2–1.3)
BILIRUB UR QL STRIP: NEGATIVE
BUN SERPL-MCNC: 36 MG/DL (ref 7–30)
CALCIUM SERPL-MCNC: 9.2 MG/DL (ref 8.5–10.1)
CHLORIDE SERPL-SCNC: 104 MMOL/L (ref 94–109)
CO2 SERPL-SCNC: 27 MMOL/L (ref 20–32)
COLOR UR AUTO: YELLOW
CREAT SERPL-MCNC: 1.53 MG/DL (ref 0.52–1.04)
DIFFERENTIAL METHOD BLD: ABNORMAL
EOSINOPHIL # BLD AUTO: 0.2 10E9/L (ref 0–0.7)
EOSINOPHIL NFR BLD AUTO: 3.1 %
ERYTHROCYTE [DISTWIDTH] IN BLOOD BY AUTOMATED COUNT: 14.8 % (ref 10–15)
GFR SERPL CREATININE-BSD FRML MDRD: 34 ML/MIN/1.7M2
GLUCOSE BLDC GLUCOMTR-MCNC: 108 MG/DL (ref 70–99)
GLUCOSE BLDC GLUCOMTR-MCNC: 143 MG/DL (ref 70–99)
GLUCOSE BLDC GLUCOMTR-MCNC: 176 MG/DL (ref 70–99)
GLUCOSE BLDC GLUCOMTR-MCNC: 178 MG/DL (ref 70–99)
GLUCOSE BLDC GLUCOMTR-MCNC: 202 MG/DL (ref 70–99)
GLUCOSE BLDC GLUCOMTR-MCNC: 213 MG/DL (ref 70–99)
GLUCOSE SERPL-MCNC: 98 MG/DL (ref 70–99)
GLUCOSE UR STRIP-MCNC: NEGATIVE MG/DL
HCT VFR BLD AUTO: 32.7 % (ref 35–47)
HGB BLD-MCNC: 9.9 G/DL (ref 11.7–15.7)
HGB UR QL STRIP: ABNORMAL
HYALINE CASTS #/AREA URNS LPF: 1 /LPF (ref 0–2)
IMM GRANULOCYTES # BLD: 0 10E9/L (ref 0–0.4)
IMM GRANULOCYTES NFR BLD: 0.2 %
INTERPRETATION ECG - MUSE: NORMAL
INTERPRETATION ECG - MUSE: NORMAL
KETONES UR STRIP-MCNC: NEGATIVE MG/DL
LACTATE BLD-SCNC: 1 MMOL/L (ref 0.7–2.1)
LEUKOCYTE ESTERASE UR QL STRIP: ABNORMAL
LIPASE SERPL-CCNC: 190 U/L (ref 73–393)
LYMPHOCYTES # BLD AUTO: 1 10E9/L (ref 0.8–5.3)
LYMPHOCYTES NFR BLD AUTO: 19.7 %
MCH RBC QN AUTO: 28.2 PG (ref 26.5–33)
MCHC RBC AUTO-ENTMCNC: 30.3 G/DL (ref 31.5–36.5)
MCV RBC AUTO: 93 FL (ref 78–100)
MONOCYTES # BLD AUTO: 0.3 10E9/L (ref 0–1.3)
MONOCYTES NFR BLD AUTO: 5.7 %
MUCOUS THREADS #/AREA URNS LPF: PRESENT /LPF
NEUTROPHILS # BLD AUTO: 3.7 10E9/L (ref 1.6–8.3)
NEUTROPHILS NFR BLD AUTO: 71.1 %
NITRATE UR QL: NEGATIVE
NRBC # BLD AUTO: 0 10*3/UL
NRBC BLD AUTO-RTO: 0 /100
PH UR STRIP: 6 PH (ref 5–7)
PLATELET # BLD AUTO: 110 10E9/L (ref 150–450)
POTASSIUM SERPL-SCNC: 4.1 MMOL/L (ref 3.4–5.3)
PROT SERPL-MCNC: 7.8 G/DL (ref 6.8–8.8)
RBC # BLD AUTO: 3.51 10E12/L (ref 3.8–5.2)
RBC #/AREA URNS AUTO: 12 /HPF (ref 0–2)
SIROLIMUS BLD-MCNC: 3.4 UG/L (ref 5–15)
SODIUM SERPL-SCNC: 138 MMOL/L (ref 133–144)
SP GR UR STRIP: 1.01 (ref 1–1.03)
SQUAMOUS #/AREA URNS AUTO: <1 /HPF (ref 0–1)
TACROLIMUS BLD-MCNC: 3.4 UG/L (ref 5–15)
TME LAST DOSE: ABNORMAL H
TME LAST DOSE: ABNORMAL H
TRANS CELLS #/AREA URNS HPF: <1 /HPF (ref 0–1)
TROPONIN I SERPL-MCNC: NORMAL UG/L (ref 0–0.04)
URN SPEC COLLECT METH UR: ABNORMAL
UROBILINOGEN UR STRIP-MCNC: NORMAL MG/DL (ref 0–2)
WBC # BLD AUTO: 5.2 10E9/L (ref 4–11)
WBC #/AREA URNS AUTO: 155 /HPF (ref 0–2)
WBC CLUMPS #/AREA URNS HPF: PRESENT /HPF

## 2017-04-19 PROCEDURE — 81003 URINALYSIS AUTO W/O SCOPE: CPT | Performed by: EMERGENCY MEDICINE

## 2017-04-19 PROCEDURE — 84484 ASSAY OF TROPONIN QUANT: CPT | Performed by: EMERGENCY MEDICINE

## 2017-04-19 PROCEDURE — 36415 COLL VENOUS BLD VENIPUNCTURE: CPT | Performed by: EMERGENCY MEDICINE

## 2017-04-19 PROCEDURE — 25000132 ZZH RX MED GY IP 250 OP 250 PS 637: Mod: GY | Performed by: INTERNAL MEDICINE

## 2017-04-19 PROCEDURE — 87186 SC STD MICRODIL/AGAR DIL: CPT | Performed by: EMERGENCY MEDICINE

## 2017-04-19 PROCEDURE — 87088 URINE BACTERIA CULTURE: CPT | Performed by: EMERGENCY MEDICINE

## 2017-04-19 PROCEDURE — 80195 ASSAY OF SIROLIMUS: CPT | Performed by: EMERGENCY MEDICINE

## 2017-04-19 PROCEDURE — 80053 COMPREHEN METABOLIC PANEL: CPT | Performed by: EMERGENCY MEDICINE

## 2017-04-19 PROCEDURE — 00000146 ZZHCL STATISTIC GLUCOSE BY METER IP

## 2017-04-19 PROCEDURE — 96375 TX/PRO/DX INJ NEW DRUG ADDON: CPT | Performed by: EMERGENCY MEDICINE

## 2017-04-19 PROCEDURE — 96374 THER/PROPH/DIAG INJ IV PUSH: CPT | Performed by: EMERGENCY MEDICINE

## 2017-04-19 PROCEDURE — 12000001 ZZH R&B MED SURG/OB UMMC

## 2017-04-19 PROCEDURE — 25000131 ZZH RX MED GY IP 250 OP 636 PS 637: Mod: GY | Performed by: INTERNAL MEDICINE

## 2017-04-19 PROCEDURE — 85025 COMPLETE CBC W/AUTO DIFF WBC: CPT | Performed by: EMERGENCY MEDICINE

## 2017-04-19 PROCEDURE — 96365 THER/PROPH/DIAG IV INF INIT: CPT | Performed by: EMERGENCY MEDICINE

## 2017-04-19 PROCEDURE — 25000128 H RX IP 250 OP 636: Performed by: INTERNAL MEDICINE

## 2017-04-19 PROCEDURE — 25000128 H RX IP 250 OP 636: Performed by: EMERGENCY MEDICINE

## 2017-04-19 PROCEDURE — 80197 ASSAY OF TACROLIMUS: CPT | Performed by: EMERGENCY MEDICINE

## 2017-04-19 PROCEDURE — 96361 HYDRATE IV INFUSION ADD-ON: CPT | Performed by: EMERGENCY MEDICINE

## 2017-04-19 PROCEDURE — 82140 ASSAY OF AMMONIA: CPT | Performed by: EMERGENCY MEDICINE

## 2017-04-19 PROCEDURE — 83605 ASSAY OF LACTIC ACID: CPT | Performed by: EMERGENCY MEDICINE

## 2017-04-19 PROCEDURE — 99223 1ST HOSP IP/OBS HIGH 75: CPT | Mod: AI | Performed by: INTERNAL MEDICINE

## 2017-04-19 PROCEDURE — 25000125 ZZHC RX 250: Performed by: INTERNAL MEDICINE

## 2017-04-19 PROCEDURE — 83690 ASSAY OF LIPASE: CPT | Performed by: EMERGENCY MEDICINE

## 2017-04-19 PROCEDURE — 87086 URINE CULTURE/COLONY COUNT: CPT | Performed by: EMERGENCY MEDICINE

## 2017-04-19 PROCEDURE — A9270 NON-COVERED ITEM OR SERVICE: HCPCS | Mod: GY | Performed by: INTERNAL MEDICINE

## 2017-04-19 PROCEDURE — 93005 ELECTROCARDIOGRAM TRACING: CPT | Performed by: EMERGENCY MEDICINE

## 2017-04-19 PROCEDURE — 87040 BLOOD CULTURE FOR BACTERIA: CPT | Performed by: EMERGENCY MEDICINE

## 2017-04-19 PROCEDURE — 96376 TX/PRO/DX INJ SAME DRUG ADON: CPT | Performed by: EMERGENCY MEDICINE

## 2017-04-19 RX ORDER — CEFTRIAXONE 1 G/1
1 INJECTION, POWDER, FOR SOLUTION INTRAMUSCULAR; INTRAVENOUS EVERY 24 HOURS
Status: DISCONTINUED | OUTPATIENT
Start: 2017-04-20 | End: 2017-04-19

## 2017-04-19 RX ORDER — PREDNISONE 2.5 MG/1
2.5 TABLET ORAL DAILY
Status: DISCONTINUED | OUTPATIENT
Start: 2017-04-19 | End: 2017-04-28

## 2017-04-19 RX ORDER — ACETAMINOPHEN 325 MG/1
325-650 TABLET ORAL EVERY 4 HOURS PRN
Status: DISCONTINUED | OUTPATIENT
Start: 2017-04-19 | End: 2017-04-28 | Stop reason: HOSPADM

## 2017-04-19 RX ORDER — LEVOTHYROXINE SODIUM 50 UG/1
50 TABLET ORAL DAILY
Status: DISCONTINUED | OUTPATIENT
Start: 2017-04-19 | End: 2017-04-28 | Stop reason: HOSPADM

## 2017-04-19 RX ORDER — OXCARBAZEPINE 300 MG/1
300 TABLET, FILM COATED ORAL 2 TIMES DAILY
Status: DISCONTINUED | OUTPATIENT
Start: 2017-04-19 | End: 2017-04-19

## 2017-04-19 RX ORDER — CLONIDINE HYDROCHLORIDE 0.1 MG/1
0.1 TABLET ORAL 2 TIMES DAILY
Status: DISCONTINUED | OUTPATIENT
Start: 2017-04-19 | End: 2017-04-25

## 2017-04-19 RX ORDER — KETOROLAC TROMETHAMINE 30 MG/ML
15 INJECTION, SOLUTION INTRAMUSCULAR; INTRAVENOUS ONCE
Status: COMPLETED | OUTPATIENT
Start: 2017-04-19 | End: 2017-04-19

## 2017-04-19 RX ORDER — NALOXONE HYDROCHLORIDE 0.4 MG/ML
.1-.4 INJECTION, SOLUTION INTRAMUSCULAR; INTRAVENOUS; SUBCUTANEOUS
Status: DISCONTINUED | OUTPATIENT
Start: 2017-04-19 | End: 2017-04-28 | Stop reason: HOSPADM

## 2017-04-19 RX ORDER — PRAVASTATIN SODIUM 10 MG
10 TABLET ORAL DAILY
Status: DISCONTINUED | OUTPATIENT
Start: 2017-04-19 | End: 2017-04-28 | Stop reason: HOSPADM

## 2017-04-19 RX ORDER — SODIUM CHLORIDE 9 MG/ML
INJECTION, SOLUTION INTRAVENOUS CONTINUOUS
Status: DISCONTINUED | OUTPATIENT
Start: 2017-04-19 | End: 2017-04-20

## 2017-04-19 RX ORDER — AMPICILLIN AND SULBACTAM 2; 1 G/1; G/1
3 INJECTION, POWDER, FOR SOLUTION INTRAMUSCULAR; INTRAVENOUS EVERY 6 HOURS SCHEDULED
Status: DISCONTINUED | OUTPATIENT
Start: 2017-04-19 | End: 2017-04-21

## 2017-04-19 RX ORDER — AMPICILLIN 2 G/1
2 INJECTION, POWDER, FOR SOLUTION INTRAVENOUS EVERY 6 HOURS
Status: DISCONTINUED | OUTPATIENT
Start: 2017-04-19 | End: 2017-04-19

## 2017-04-19 RX ORDER — FAMOTIDINE 20 MG/1
20 TABLET, FILM COATED ORAL DAILY
Status: DISCONTINUED | OUTPATIENT
Start: 2017-04-19 | End: 2017-04-20

## 2017-04-19 RX ORDER — ONDANSETRON 2 MG/ML
4 INJECTION INTRAMUSCULAR; INTRAVENOUS ONCE
Status: COMPLETED | OUTPATIENT
Start: 2017-04-19 | End: 2017-04-19

## 2017-04-19 RX ORDER — DEXTROSE MONOHYDRATE 25 G/50ML
25-50 INJECTION, SOLUTION INTRAVENOUS
Status: DISCONTINUED | OUTPATIENT
Start: 2017-04-19 | End: 2017-04-28 | Stop reason: HOSPADM

## 2017-04-19 RX ORDER — LABETALOL 200 MG/1
200 TABLET, FILM COATED ORAL 2 TIMES DAILY
Status: DISCONTINUED | OUTPATIENT
Start: 2017-04-19 | End: 2017-04-27

## 2017-04-19 RX ORDER — TACROLIMUS 1 MG/1
1 CAPSULE ORAL
Status: DISCONTINUED | OUTPATIENT
Start: 2017-04-19 | End: 2017-04-20

## 2017-04-19 RX ORDER — ONDANSETRON 2 MG/ML
4 INJECTION INTRAMUSCULAR; INTRAVENOUS EVERY 6 HOURS PRN
Status: DISCONTINUED | OUTPATIENT
Start: 2017-04-19 | End: 2017-04-28 | Stop reason: HOSPADM

## 2017-04-19 RX ORDER — ASPIRIN 81 MG/1
81 TABLET, CHEWABLE ORAL DAILY
Status: DISCONTINUED | OUTPATIENT
Start: 2017-04-19 | End: 2017-04-25

## 2017-04-19 RX ORDER — CEFTRIAXONE 1 G/1
1 INJECTION, POWDER, FOR SOLUTION INTRAMUSCULAR; INTRAVENOUS ONCE
Status: COMPLETED | OUTPATIENT
Start: 2017-04-19 | End: 2017-04-19

## 2017-04-19 RX ORDER — ONDANSETRON 4 MG/1
4 TABLET, ORALLY DISINTEGRATING ORAL EVERY 6 HOURS PRN
Status: DISCONTINUED | OUTPATIENT
Start: 2017-04-19 | End: 2017-04-28 | Stop reason: HOSPADM

## 2017-04-19 RX ORDER — NICOTINE POLACRILEX 4 MG
15-30 LOZENGE BUCCAL
Status: DISCONTINUED | OUTPATIENT
Start: 2017-04-19 | End: 2017-04-28 | Stop reason: HOSPADM

## 2017-04-19 RX ADMIN — LEVOTHYROXINE SODIUM 50 MCG: 50 TABLET ORAL at 09:29

## 2017-04-19 RX ADMIN — CLONIDINE HYDROCHLORIDE 0.1 MG: 0.1 TABLET ORAL at 20:10

## 2017-04-19 RX ADMIN — TACROLIMUS 1 MG: 1 CAPSULE ORAL at 18:08

## 2017-04-19 RX ADMIN — CEFTRIAXONE 1 G: 1 INJECTION, POWDER, FOR SOLUTION INTRAMUSCULAR; INTRAVENOUS at 02:11

## 2017-04-19 RX ADMIN — PREDNISONE 2.5 MG: 2.5 TABLET ORAL at 09:29

## 2017-04-19 RX ADMIN — SODIUM CHLORIDE: 9 INJECTION, SOLUTION INTRAVENOUS at 12:05

## 2017-04-19 RX ADMIN — INSULIN ASPART 2 UNITS: 100 INJECTION, SOLUTION INTRAVENOUS; SUBCUTANEOUS at 17:11

## 2017-04-19 RX ADMIN — SODIUM CHLORIDE 1000 ML: 9 INJECTION, SOLUTION INTRAVENOUS at 02:10

## 2017-04-19 RX ADMIN — KETOROLAC TROMETHAMINE 15 MG: 30 INJECTION, SOLUTION INTRAMUSCULAR at 01:45

## 2017-04-19 RX ADMIN — PRAVASTATIN SODIUM 10 MG: 10 TABLET ORAL at 09:13

## 2017-04-19 RX ADMIN — SODIUM CHLORIDE 1000 ML: 9 INJECTION, SOLUTION INTRAVENOUS at 00:02

## 2017-04-19 RX ADMIN — ONDANSETRON 4 MG: 2 INJECTION INTRAMUSCULAR; INTRAVENOUS at 00:36

## 2017-04-19 RX ADMIN — CLONIDINE HYDROCHLORIDE 0.1 MG: 0.1 TABLET ORAL at 09:28

## 2017-04-19 RX ADMIN — AMPICILLIN SODIUM AND SULBACTAM SODIUM 3 G: 2; 1 INJECTION, POWDER, FOR SOLUTION INTRAMUSCULAR; INTRAVENOUS at 18:07

## 2017-04-19 RX ADMIN — LABETALOL HCL 200 MG: 200 TABLET, FILM COATED ORAL at 09:19

## 2017-04-19 RX ADMIN — SIROLIMUS 1.5 MG: 1 TABLET, SUGAR COATED ORAL at 09:28

## 2017-04-19 RX ADMIN — OXCARBAZEPINE 300 MG: 300 TABLET, FILM COATED ORAL at 09:13

## 2017-04-19 RX ADMIN — LABETALOL HCL 200 MG: 200 TABLET, FILM COATED ORAL at 20:09

## 2017-04-19 RX ADMIN — ONDANSETRON 4 MG: 2 INJECTION INTRAMUSCULAR; INTRAVENOUS at 00:02

## 2017-04-19 RX ADMIN — TACROLIMUS 1 MG: 1 CAPSULE ORAL at 09:29

## 2017-04-19 RX ADMIN — FAMOTIDINE 20 MG: 20 TABLET ORAL at 09:14

## 2017-04-19 RX ADMIN — AMPICILLIN SODIUM AND SULBACTAM SODIUM 3 G: 2; 1 INJECTION, POWDER, FOR SOLUTION INTRAMUSCULAR; INTRAVENOUS at 12:36

## 2017-04-19 RX ADMIN — INSULIN ASPART 1 UNITS: 100 INJECTION, SOLUTION INTRAVENOUS; SUBCUTANEOUS at 12:13

## 2017-04-19 RX ADMIN — ASPIRIN 81 MG: 81 TABLET, CHEWABLE ORAL at 09:29

## 2017-04-19 NOTE — PROGRESS NOTES
Care Coordinator Progress Note     Admission Date/Time:  4/18/2017  Attending MD:  Prudencio Linares MD     Data  Chart reviewed, discussed with interdisciplinary team.   Patient was admitted for:    Altered mental status, unspecified altered mental status type  Urinary tract infection, site unspecified  Kidney replaced by transplant  Recurrent UTI.    Concerns with insurance coverage for discharge needs: .  Current Living Situation: Patient lives with spouse.  Support System: Supportive and Involved  Services Involved: DME and Home Care  Transportation: Agency transportation. Stretcher transport  Barriers to Discharge: chronically ill, cognitively impaired and dependent with mobility/activities of daily living    Coordination of Care and Referrals: Provided patient/family with options for none at this time..        Assessment  Attempted to meet with patient.  Pt is a 71 year old female with PMH significant for kidney transplant and chronic indwelling rico with recurrent UTIs who was admitted with altered mental status.  Patient isn't answering questions and is confused per bedside nurse.  Upon chart review, patient uses a lift device and wheelchair at home.  Patient is currently receiving [facility] for RN and HHA.  PT eval was pending.  Resumption orders placed.     Plan  Anticipated Discharge Date:  TBD  Anticipated Discharge Plan:  Home with home care    Bailey Aguilar RN, BSN  Care Coordinator [facility]  Pager: [phone]  Phone: [phone]

## 2017-04-19 NOTE — PROGRESS NOTES
Patient admitted this morning from ED with alter mental status , lethargic, and arouse to voice.  And Patient with  Hx of UTI and Neurogenic, bladder with chronic rico . Patient very confuse moaning with repositions . New rico put in with urine return. IVF infusing at 100 cc/hr and stared on Unasyn IV abx per order. Patient ate oatmeal 100% with assist of feeding and took all her scheduled medications . Unable to finish admission profile due to patient lethargic. Call light in reach bed alarm on for safety . Continue monitor closely and update MD with concerns.

## 2017-04-19 NOTE — PROGRESS NOTES
"[facility] Internal Medicine Progress Note     Gem Jade  MRN: [id number]  YOB: 1945    Today's Date: April 19, 2017        Interval History:     Pt continues to be very somnolent and not very responsive.  She does shake her head to questions and state that she is in pain, but is unable to localize where exactly.  She is moving around in bed.  In talking with her , he states she was having much difficulty with rico catheter changes as an outpatient.  She was doing well yesterday during dinner, and then became very confused and harder to arouse, which is why he brought her to ED.        4 pt ROS negative other than that noted above.          Physical Exam:   Vitals were reviewed  Blood pressure 197/78, temperature 97.6  F (36.4  C), temperature source Oral, resp. rate 18, height 1.702 m (5' 7.01\"), weight 93.4 kg (205 lb 12.8 oz), SpO2 100 %, not currently breastfeeding.    Physical Exam:    General:  Somnolent, keeps eyes closed, does moan in pain and nods yes/no to questions.   HEENT: Oral mucosa moist, pupils equal but sluggishly reactive, EOM grossly intact, anicteric sclerae  Neck: Supple, no LAD  CV: RRR, normal S1S2, no murmur, clicks, rubs  Resp: CTAB, no wheezes, rhonchi   Abd: Soft, epigastric and suprapubic tenderness to deep palpation, nondistended, no rebound or guarding, BS+, no masses appreciated  Extremities: WWP, no pedal edema or cyanosis  Neuro: somnolent, unable to cooperate with exam, No lateralizing symptoms            Data:     All labs and imaging reviewed         Assessment and Plan:   Assessment:    Gem Jade is a 70 yo F with a history of kidney transplant (1999), neurogenic bladder with chronic rico, recurrent UTI's that present with AMS, T2DM, hypertension, and hypothyroid who was admitted with altered mental status and suspected UTI.      # Complicated Urinary tract infection  # Neurogenic bladder w/ chronic rico  History of recurrent UTIs, most " recent admission in March presenting with confusion. Previous urine cultures have grown e faecalis, e coli, and klebsiella, most of which were either pan-sensitive or resistant to ampicillin or TMP-SMX.  She did have a UTI with ESBL E. Coli in February 2016.  This admission UA with 155 WBCs. S/p ceftriaxone x 1 in ED and 2 L NS  - Change ceftriaxone to Unasyn 3 G IV q 6 H, which would cover prior GNR's and E. Faecalis.    - Follow urine culture and blood cultures   - Exchange rico today  - Pt does not currently appear septic, but will broaden further to vancomycin and ertapenem if she decompensates.       # Toxic metabolic encephalopathy  Likely encephalopathy in setting of infection as above.   - She usually takes gabapentin and Trileptal for chronic pain.  Will hold these sedating medications until MS improves.    - Neuro checks q 4 H      # H/o kidney transplant  - Continue PTA tacrolimus, sirolimus, and prednisone  - Tacro level and sirolimus level from ED pending     #T2DM  - Holding glargine until tolerating po  - Medium dose SSI with hypoglycemia protocol     #Hypertension   - Continue PTA clonidine 0.1 mg BID and labetalol 200 mg BID     # Hypothyroid  - Continue PTA synthroid 50 mcg qday    #HLD - continue home pravastatin and ASA 81 mg qday     FEN/GI - Diabetic diet, but pt not eating much.   - Continue NS at 100 ml/hr until oral intake improves  - Zofran PRN   - famotidine 20 mg qday    Prophylaxis: DVT: SCDs   Disposition: Pending improvement in clinical status.  Code Status: FULL      Patient seen and discussed with Dr. Milagros Green MD  Internal Medicine-Pediatrics, PGY4  [phone]      Physician Attestation  I, Prudencio Linares MD, saw this patient with the resident and agree with the resident s findings and plan of care as documented in the resident s note with my edits.     I personally reviewed vital signs, medications, labs and imaging.    Prudencio Linares MD  Date of Service (when I saw the  patient): 4/19/17

## 2017-04-19 NOTE — PATIENT INSTRUCTIONS
Recommendations from today's MTM visit:                                                        1. Increase Lantus to 24 units daily    2. Increase Novolog to 10 units with meals      Next MTM visit: 1 month    To schedule another MTM appointment, please call the clinic directly or you may call the MTM scheduling line at [phone] or toll-free at [phone].     My Clinical Pharmacist's contact information:                                                      It was a pleasure seeing you today!  Please feel free to contact me with any questions or concerns you have.      Conchita Toledo, PharmD, BCACP     You may receive a survey about the MTM services you received.  I would appreciate your feedback to help me serve you better in the future. Please fill it out and return it when you can. Your comments will be anonymous.

## 2017-04-19 NOTE — PHARMACY-ADMISSION MEDICATION HISTORY
Admission medication history interview status for the 4/18/2017 admission is complete. See Epic admission navigator for allergy information, pharmacy, prior to admission medications and immunization status.     Medication history interview sources:  Chart Review, interview with , MEDICATION THERAPY MANAGEMENT meeting with pharmacist 4/18/17.    Changes made to PTA medication list (reason)  Added: none  Deleted: phenazopyridine  Changed: none    Additional medication history information (including reliability of information, actions taken by pharmacist):  - patient does not know medications, and  has a limited scope of medicaitons also. However, they receive assistance from [facility] for setting up med boxes.   - patient met with [facility] pharmacist on day prior to admission, thus medication list updated  - tacrolimus dose was decreased to 1mg bid from 2mg when patient started on fluconazole in early march, and has been kept on dose since fluconazole was discontinued in march.      Prior to Admission medications    Medication Sig Last Dose Taking? Auth Provider   insulin aspart (NOVOLOG FLEXPEN) 100 UNIT/ML injection Inject 10 units before meals BID. At bedtime use following sliding scale:   150-200 5u  201-250 7u  251-300 9u  301-350 11u  351+ 13u  Yes    insulin glargine (LANTUS SOLOSTAR) 100 UNIT/ML injection Inject 24 units under the skin daily.  Yes    cyanocobalamin (VITAMIN B12) 1000 MCG/ML injection Inject 1 mL (1,000 mcg) into the muscle every 30 days  Yes Marivel Barcenas APRN CNP   predniSONE (DELTASONE) 2.5 MG tablet Take 1 tablet (2.5 mg) by mouth daily  Yes Ross Elizabeth MD   sirolimus (RAPAMUNE - GENERIC EQUIVALENT) 0.5 MG tablet Take 3 tablets (1.5 mg) by mouth daily  Yes Ross Elizabeth MD   gabapentin (NEURONTIN) 300 MG capsule Take 2 tabs 2x day for one week then increase to 3 tabs 2x daily  Yes Marivel Barcenas APRN CNP   meclizine (ANTIVERT)  12.5 MG tablet Take 1 tablet (12.5 mg) by mouth 3 times daily  Yes Alexi Richard MD   tacrolimus (PROGRAF - GENERIC EQUIVALENT) 1 MG capsule Take 1 capsule (1 mg) by mouth 2 times daily  Yes Alexi Richard MD   diphenoxylate-atropine (LOMOTIL) 2.5-0.025 MG per tablet Take 1 tablet by mouth 4 times daily as needed for diarrhea  Yes Marivel Barcenas APRN CNP   TraMADol HCl 50 MG TBDP Take 50 mg by mouth 3 times daily as needed  Patient taking differently: Take 50 mg by mouth 2 times daily   Yes Marivel Barcenas APRN CNP   blood glucose monitoring (ACCU-CHEK COMPACT PLUS) test strip Use to test blood sugar 3 times daily or as directed.  Yes Marivel Barcenas APRN CNP   levothyroxine (SYNTHROID) 50 MCG tablet Take 1 tablet (50 mcg) by mouth daily  Yes Marivel Barcenas APRN CNP   cloNIDine (CATAPRES) 0.1 MG tablet Take 1 tablet (0.1 mg) by mouth 2 times daily  Yes Marivel Barcenas APRN CNP   labetalol (NORMODYNE) 200 MG tablet Take 1 tablet (200 mg) by mouth 2 times daily  Yes Marivel Barcenas APRN CNP   glucagon (GLUCAGON EMERGENCY) 1 MG injection Inject 1 mg into the muscle once  Yes Barb Gordon APRN CNP   clotrimazole (LOTRIMIN) 1 % cream Apply topically 2 times daily Apply to buttocks twice a day until rash resolved.  Yes Barb Gordon APRN CNP   loratadine (CLARITIN) 10 MG tablet Take 1 tablet (10 mg) by mouth daily  Yes Marivel Barcenas APRN CNP   insulin pen needle 30G X 8 MM Use as directed with Lantus  Yes Marivel Barcenas APRN CNP   pravastatin (PRAVACHOL) 10 MG tablet Take 1 tablet (10 mg) by mouth daily  Yes Marivel Barcenas APRN CNP   ondansetron (ZOFRAN ODT) 4 MG disintegrating tablet Take 1-2 tablets (4-8 mg) by mouth every 8 hours as needed for nausea  Yes Marivel Barcenas, ANTHONY CNP   simethicone (MYLICON) 125 MG CHEW Take 1 tablet (125 mg) by mouth as needed for intestinal gas  Yes   "  Lactobacillus Rhamnosus, GG, (CULTURELL) capsule Take 1 capsule by mouth 2 times daily  Yes She Siddiqui MD   Nystatin (NYSTOP) 617592 UNIT/GM POWD Externally apply 1 g topically 3 times daily as needed  Yes Marivel Barcenas APRN CNP   alcohol swab prep pads Use to swab area of injection/yvon and vials as directed.  Yes Marivel Barcenas APRN CNP   Cranberry 400 MG TABS Take 400 mg by mouth 2 times daily  Yes Unknown, Entered By History   famotidine (PEPCID) 20 MG tablet Take 1 tablet (20 mg) by mouth 2 times daily  Yes Marivel Barcenas APRN CNP   ASPIRIN PO Take 81 mg by mouth daily.    Yes Unknown, Entered By History   ACETAMINOPHEN PO Take 325-650 mg by mouth every 4 hours as needed.  Yes Unknown, Entered By History   order for DME Equipment being ordered: Silver Impregnated catheters  HX of frequent UTI   Marivel Barcenas APRN CNP   OXcarbazepine (TRILEPTAL) 300 MG tablet Take 1 tablet (300 mg) by mouth 2 times daily   Marivel Barcenas APRN CNP   syringe/needle, disp, (B-D SYRINGE/NEEDLE) 22G X 1-1/2\" 3 ML MISC Inject 1 Syringe into the muscle every 30 days   Lorenzo Choudhury MD   order for DME Equipment being ordered: Ahuja FX CPAP interface (nasal pillows), size XS.   Lorenzo Choudhury MD   order for DME Equipment being ordered: Blue Chux  Please send to [facility]   Marivel Barcenas APRN CNP   ORDER FOR DME Equipment being ordered: Johnson Catheters - 18 french   Marivel Barcenas APRN CNP   ACE NOT PRESCRIBED, INTENTIONAL, 1 each continuous prn ACE Inhibitor not prescribed due to Worsening renal function on ACE/ARB therapy   Marivel Barcenas APRN CNP         Medication history completed by: Isaiah Chacon PharmD  "

## 2017-04-19 NOTE — H&P
"[facility]      History and Physical  Gem Jade MRN: [id number]  1945  Date of Admission:4/18/2017  Primary care provider: Marivel Barcenas           Chief Complaint:   Altered mental status         History of Present Illness:   Gem Jade is a 72 yo F with a history of kidney transplant (1999), neurogenic bladder with chronic rico, recurrent UTI, T2DM, hypertension, and hypothyroid who presents with c/o altered mental status.     Of note, patient was recently hospitalized from 3/8-3/15 with acute kidney injury and  Urinary tract infection.  Found to have e. faecalis and candida on urine culture. Treated with course of amoxicillin and fluconazole x 14D.     History from patient is limited.  not present on interview. Today, she is brought in for worsening mental status. Reportedly minimally responsive this evening and in the ED only stating \"I want to throw up\". Per report, her rico catheter has been changed ~4 times since her last admission, most recently yesterday.     In the ED, VSS. Labs  Notable for UA with 155 WBC. She was started on ceftriaxone for presumed UTI and admitted for further management.         Review of Systems:   Unable to assess given patient's altered mental status.         Past Medical History:   Medical History reviewed.   Past Medical History:   Diagnosis Date     Background diabetic retinopathy(362.01)     extensive diabetic retinopathy, s/p multiple laser treatments     Diabetic gastroparesis (H)     followed by [facility] (Dr. Chen)     Diarrhea      Dizziness and giddiness      Hyponatremia 12/16/11    Na 121     Kidney replaced by transplant      Mixed hyperlipidemia      Obesity      Osteopenia 11/07    per DEXA     Other and unspecified hyperlipidemia      Other pulmonary embolism and infarction 3/03    Bilateral pulmonary emboli post op after knee replacement     Polyneuropathy in diabetes(357.2)      Primary localized osteoarthrosis, " lower leg      Pyelonephritis, unspecified      Type 2 diabetes mellitus with complications (H)      Urinary tract infection, site not specified     Chronic UTIs     UTI (urinary tract infection) due to urinary indwelling catheter (H)              Past Surgical History:   Surgical History reviewed.   Past Surgical History:   Procedure Laterality Date     C NONSPECIFIC PROCEDURE  10/99    kidney transplant     C NONSPECIFIC PROCEDURE  8/00    MRI head, lumbar spine, pelvis     C TOTAL KNEE ARTHROPLASTY  3/03    Knee Replacement, Total right, post op PE     CHOLECYSTECTOMY       HC LEFT HEART CATHETERIZATION  1999    Cardiac Cath, Left Heart, Negative  (@ [facility])     HC LEFT HEART CATHETERIZATION  4/05    normal coronary angi[facility], had mild troponin rise (0.71)     HC REMV CATARACT EXTRACAP,INSERT LENS  8/04    bilateral cataract repaired, left eye              Social History:   Social History reviewed.  Social History   Substance Use Topics     Smoking status: Former Smoker     Years: 17.00     Quit date: 6/1/1987     Smokeless tobacco: Not on file     Alcohol use No             Family History:   Family History reviewed.   Family History   Problem Relation Age of Onset     DIABETES Mother      DIABETES Brother      Hypertension Father      [id number] pneumonia     HEART DISEASE Father              Allergies:     Allergies   Allergen Reactions     Bactrim Ds [Sulfamethoxazole W/Trimethoprim]      Creatinine level increased     Atorvastatin Calcium      myalgias, muscle aches     Codeine Nausea and Vomiting     Darvocet [Propoxyphene N-Apap] Nausea and Vomiting     Hydromorphone      Levofloxacin Other (See Comments) and Diarrhea     hallucinations     Morphine      Nausea and vomiting     Niaspan [Niacin] GI Disturbance     Flushing even at low dose, GI upset     Percocet [Oxycodone-Acetaminophen]      Vomiting after taking med couple of times     Vicodin [Hydrocodone-Acetaminophen] Nausea and Vomiting              Medications:   Medications Reviewed.   Current Facility-Administered Medications   Medication     cefTRIAXone (ROCEPHIN) 1 g vial to attach to  mL bag for ADULTS or NS 50 mL bag for PEDS     Current Outpatient Prescriptions   Medication Sig     insulin aspart (NOVOLOG FLEXPEN) 100 UNIT/ML injection Inject 10 units before meals BID. At bedtime use following sliding scale:   150-200 5u  201-250 7u  251-300 9u  301-350 11u  351+ 13u     insulin glargine (LANTUS SOLOSTAR) 100 UNIT/ML injection Inject 24 units under the skin daily.     cyanocobalamin (VITAMIN B12) 1000 MCG/ML injection Inject 1 mL (1,000 mcg) into the muscle every 30 days     predniSONE (DELTASONE) 2.5 MG tablet Take 1 tablet (2.5 mg) by mouth daily     order for DME Equipment being ordered: Silver Impregnated catheters  HX of frequent UTI     sirolimus (RAPAMUNE - GENERIC EQUIVALENT) 0.5 MG tablet Take 3 tablets (1.5 mg) by mouth daily     gabapentin (NEURONTIN) 300 MG capsule Take 2 tabs 2x day for one week then increase to 3 tabs 2x daily     meclizine (ANTIVERT) 12.5 MG tablet Take 1 tablet (12.5 mg) by mouth 3 times daily     tacrolimus (PROGRAF - GENERIC EQUIVALENT) 1 MG capsule Take 1 capsule (1 mg) by mouth 2 times daily     diphenoxylate-atropine (LOMOTIL) 2.5-0.025 MG per tablet Take 1 tablet by mouth 4 times daily as needed for diarrhea     TraMADol HCl 50 MG TBDP Take 50 mg by mouth 3 times daily as needed (Patient taking differently: Take 50 mg by mouth 2 times daily )     blood glucose monitoring (ACCU-CHEK COMPACT PLUS) test strip Use to test blood sugar 3 times daily or as directed.     OXcarbazepine (TRILEPTAL) 300 MG tablet Take 1 tablet (300 mg) by mouth 2 times daily     levothyroxine (SYNTHROID) 50 MCG tablet Take 1 tablet (50 mcg) by mouth daily     cloNIDine (CATAPRES) 0.1 MG tablet Take 1 tablet (0.1 mg) by mouth 2 times daily     labetalol (NORMODYNE) 200 MG tablet Take 1 tablet (200 mg) by mouth 2 times daily      "syringe/needle, disp, (B-D SYRINGE/NEEDLE) 22G X 1-1/2\" 3 ML MISC Inject 1 Syringe into the muscle every 30 days     order for DME Equipment being ordered: 1bib FX CPAP interface (nasal pillows), size XS.     glucagon (GLUCAGON EMERGENCY) 1 MG injection Inject 1 mg into the muscle once     clotrimazole (LOTRIMIN) 1 % cream Apply topically 2 times daily Apply to buttocks twice a day until rash resolved.     loratadine (CLARITIN) 10 MG tablet Take 1 tablet (10 mg) by mouth daily     insulin pen needle 30G X 8 MM Use as directed with Lantus     pravastatin (PRAVACHOL) 10 MG tablet Take 1 tablet (10 mg) by mouth daily     ondansetron (ZOFRAN ODT) 4 MG disintegrating tablet Take 1-2 tablets (4-8 mg) by mouth every 8 hours as needed for nausea     simethicone (MYLICON) 125 MG CHEW Take 1 tablet (125 mg) by mouth as needed for intestinal gas     Lactobacillus Rhamnosus, GG, (CULTURELL) capsule Take 1 capsule by mouth 2 times daily     Nystatin (NYSTOP) 759464 UNIT/GM POWD Externally apply 1 g topically 3 times daily as needed     order for DME Equipment being ordered: Blue Chux  Please send to [facility] medical     alcohol swab prep pads Use to swab area of injection/yvon and vials as directed.     Cranberry 400 MG TABS Take 400 mg by mouth 2 times daily     phenazopyridine (PYRIDIUM) 100 MG tablet Take 1 tablet (100 mg) by mouth 3 times daily as needed for irritation     famotidine (PEPCID) 20 MG tablet Take 1 tablet (20 mg) by mouth 2 times daily     ORDER FOR DME Equipment being ordered: Johnson Catheters - 18 french     ACE NOT PRESCRIBED, INTENTIONAL, 1 each continuous prn ACE Inhibitor not prescribed due to Worsening renal function on ACE/ARB therapy     ASPIRIN PO Take 81 mg by mouth daily.       ACETAMINOPHEN PO Take 325-650 mg by mouth every 4 hours as needed.             Physical Exam:   Vitals were reviewed.  Temp:  [99.4  F (37.4  C)] 99.4  F (37.4  C)  Heart Rate:  [65-73] 73  Resp:  [16] 16  BP: " (141-216)/() 188/94  SpO2:  [92 %-100 %] 97 %    General: Lethargic, eyes closed, but nodding yes/no appropriately to questions  HEENT: PERRLA  Neck: Supple  CV: RRR, normal S1S2, no murmur, clicks, rubs  Resp: Clear to auscultation bilaterally  Abd: Soft, non-tender, BS+, no rebound or guarding   : Rico catheter in place  Extremities: warm and well perfused, no LE edema  Neuro: Unable to assess  Skin: No rashes appreciated         Data:        ROUTINE LABS  CMP  Recent Labs  Lab 04/19/17  0005      POTASSIUM 4.1   CHLORIDE 104   CO2 27   ANIONGAP 7   GLC 98   BUN 36*   CR 1.53*   GFRESTIMATED 34*   GFRESTBLACK 41*   ROGER 9.2   PROTTOTAL 7.8   ALBUMIN 3.1*   BILITOTAL 0.2   ALKPHOS 101   AST 24   ALT 44     CBC  Recent Labs  Lab 04/19/17  0005   WBC 5.2   RBC 3.51*   HGB 9.9*   HCT 32.7*   MCV 93   MCH 28.2   MCHC 30.3*   RDW 14.8   *       Assessment and Plan:     Gem Jade is a 70 yo F with a history of kidney transplant (1999), neurogenic bladder with chronic rico, recurrent UTI, T2DM, hypertension, and hypothyroid who presents with c/o altered mental status, found to have UTI.     # Urinary tract infection  # Neurogenic bladder w/ chronic rico  History of recurrent UTIs, most recent admission in March presenting with confusion. Previous urine cultures have grown e faecalis, e coli, and klebsiella. Complicated in setting of chronic indwelling rico. This admission UA with 155 WBCs. S/p ceftriaxone x 1 in ED  - Continue empiric ceftriaxone   - Follow urine culture and blood culture   - Will need catheter exchange     # Toxic metabolic encephalopathy  Likely encephalopathy in setting of infection as above.   - Hold sedating medications until MS improves  - Neuro checks    # H/o kidney transplant  - Continue PTA tacrolimus, sirolimus, and prednisone  - Tacro level and sirolimus level from ED pending    #T2DM  - Holding glargine until tolerating po  - Medium dose SSI     #Hypertension   -  Continue PTA clonidine and labetalol    # Hypothyroid  - Continue PTA synthroid    Prophylaxis:  DVT: SCDs     Disposition: Pending improvement in clinical status.  Code Status: FULL    To be staffed with attending in AM.     Usha Herrera MD, MPH  Internal Medicine, PGY-3  Pager [phone]      Physician Attestation  I, Prudencio Linares MD, saw this patient with the resident and agree with the resident s findings and plan of care as documented in the resident s note with my edits.     I personally reviewed vital signs, medications, labs and imaging.    Prudencio Linares MD  Date of Service (when I saw the patient): 4/19/17

## 2017-04-19 NOTE — ED PROVIDER NOTES
"  History     Chief Complaint   Patient presents with     Altered Mental Status     decreased LOC     HPI  Gem Jade is a 71 year old female with history significant for kidney transplant (1999), neurogenic bladder , complex UTIs, morbid obesity, mobility issues, fatigue, DM-II, chronic pain, hypothyroidism, HTN, HLD, recently admitted 3/8-3/15 for acute kidney injury and altered mental status    Patient presents to the ED today via ambulance for evaluation of altered mental status. Patient has been  unresponsive since 8 PM, and minimally responsive in the ED repeatedly stating \"I want to throw up.\" Patient has a chronic Johnson catheter with multiple replacements, most recently yesterday.     Social: The patient lives at home with her .  I have reviewed the Medications, Allergies, Past Medical and Surgical History, and Social History in the Epic system.      PAST MEDICAL HISTORY:   Past Medical History:   Diagnosis Date     Background diabetic retinopathy(362.01)     extensive diabetic retinopathy, s/p multiple laser treatments     Diabetic gastroparesis (H)     followed by [facility] (Dr. 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)      Urinary tract infection, site not specified     Chronic UTIs     UTI (urinary tract infection) due to urinary indwelling catheter (H)        PAST SURGICAL HISTORY:   Past Surgical History:   Procedure Laterality Date     C NONSPECIFIC PROCEDURE  10/99    kidney transplant     C NONSPECIFIC PROCEDURE  8/00    " "MRI head, lumbar spine, pelvis     C TOTAL KNEE ARTHROPLASTY  3/03    Knee Replacement, Total right, post op PE     CHOLECYSTECTOMY       HC LEFT HEART CATHETERIZATION  1999    Cardiac Cath, Left Heart, Negative  (@ [facility])     HC LEFT HEART CATHETERIZATION  4/05    normal coronary angiogram at [facility], had mild troponin rise (0.71)     HC REMV CATARACT EXTRACAP,INSERT LENS  8/04    bilateral cataract repaired, left eye        FAMILY HISTORY:   Family History   Problem Relation Age of Onset     DIABETES Mother      DIABETES Brother      Hypertension Father      [id number] pneumonia     HEART DISEASE Father        SOCIAL HISTORY:   Social History   Substance Use Topics     Smoking status: Former Smoker     Years: 17.00     Quit date: 6/1/1987     Smokeless tobacco: Not on file     Alcohol use No     Current Facility-Administered Medications   Medication     cefTRIAXone (ROCEPHIN) 1 g vial to attach to  mL bag for ADULTS or NS 50 mL bag for PEDS     0.9% sodium chloride BOLUS     Current Outpatient Prescriptions   Medication     insulin aspart (NOVOLOG FLEXPEN) 100 UNIT/ML injection     insulin glargine (LANTUS SOLOSTAR) 100 UNIT/ML injection     cyanocobalamin (VITAMIN B12) 1000 MCG/ML injection     predniSONE (DELTASONE) 2.5 MG tablet     order for DME     sirolimus (RAPAMUNE - GENERIC EQUIVALENT) 0.5 MG tablet     gabapentin (NEURONTIN) 300 MG capsule     meclizine (ANTIVERT) 12.5 MG tablet     tacrolimus (PROGRAF - GENERIC EQUIVALENT) 1 MG capsule     diphenoxylate-atropine (LOMOTIL) 2.5-0.025 MG per tablet     TraMADol HCl 50 MG TBDP     blood glucose monitoring (ACCU-CHEK COMPACT PLUS) test strip     OXcarbazepine (TRILEPTAL) 300 MG tablet     levothyroxine (SYNTHROID) 50 MCG tablet     cloNIDine (CATAPRES) 0.1 MG tablet     labetalol (NORMODYNE) 200 MG tablet     syringe/needle, disp, (B-D SYRINGE/NEEDLE) 22G X 1-1/2\" 3 ML MISC     order for DME     glucagon (GLUCAGON EMERGENCY) 1 MG injection     clotrimazole " "(LOTRIMIN) 1 % cream     loratadine (CLARITIN) 10 MG tablet     insulin pen needle 30G X 8 MM     pravastatin (PRAVACHOL) 10 MG tablet     ondansetron (ZOFRAN ODT) 4 MG disintegrating tablet     simethicone (MYLICON) 125 MG CHEW     Lactobacillus Rhamnosus, GG, (CULTURELL) capsule     Nystatin (NYSTOP) 864591 UNIT/GM POWD     order for DME     alcohol swab prep pads     Cranberry 400 MG TABS     phenazopyridine (PYRIDIUM) 100 MG tablet     famotidine (PEPCID) 20 MG tablet     ORDER FOR DME     ACE NOT PRESCRIBED, INTENTIONAL,     ASPIRIN PO     ACETAMINOPHEN PO        Allergies   Allergen Reactions     Bactrim Ds [Sulfamethoxazole W/Trimethoprim]      Creatinine level increased     Atorvastatin Calcium      myalgias, muscle aches     Codeine Nausea and Vomiting     Darvocet [Propoxyphene N-Apap] Nausea and Vomiting     Hydromorphone      Levofloxacin Other (See Comments) and Diarrhea     hallucinations     Morphine      Nausea and vomiting     Niaspan [Niacin] GI Disturbance     Flushing even at low dose, GI upset     Percocet [Oxycodone-Acetaminophen]      Vomiting after taking med couple of times     Vicodin [Hydrocodone-Acetaminophen] Nausea and Vomiting         Review of Systems   Unable to perform ROS: Mental status change       Physical Exam   BP: 156/73  Heart Rate: 67  Temp: 99.4  F (37.4  C)  Resp: 16  SpO2: 97 %  Physical Exam  Physical Exam   Constitutional:   Elderly female, somnolent, repeating \"I'm going to throw up.\"  HENT:   Head: Normocephalic and atraumatic.   Neck:   no adenopathy, no bony tenderness  Cardiovascular: regular rate and rhythm without murmurs or gallops  Pulmonary/Chest: Clear to auscultation bilaterally, with no wheezes or retractions. No respiratory distress.  GI: Soft with good bowel sounds.  Obese, nontender, non-distended, with no guarding, no rebound, no peritoneal signs.   Back:  No bony or CVA tenderness   Musculoskeletal:  no edema or clubbing   Skin: Skin is warm and dry. " "No rash noted.   Neurological: Does not open eyes to voice, keeps repeating \"I'm going to throw up\" and making heaving noises    ED Course     ED Course     Procedures       11:46 PM  The patient was seen and examined by Dr. Maldonado in Room 14.          EKG Interpretation:      Interpreted by Marielle Maldonado  Time reviewed: 12 midnight  Symptoms at time of EKG: see hpi   Rhythm: normal sinus   Rate: normal  Axis: normal  Ectopy: none  Conduction: normal  ST Segments/ T Waves: No ST-T wave changes  Q Waves: none  Comparison to prior: Unchanged from 3/8/2017    Clinical Impression: NSR rate of 67 bpm          Critical Care time:  none              Results for orders placed or performed during the hospital encounter of 04/18/17 (from the past 24 hour(s))   EKG 12-lead, tracing only   Result Value Ref Range    Interpretation ECG Click View Image link to view waveform and result    CBC with platelets differential   Result Value Ref Range    WBC 5.2 4.0 - 11.0 10e9/L    RBC Count 3.51 (L) 3.8 - 5.2 10e12/L    Hemoglobin 9.9 (L) 11.7 - 15.7 g/dL    Hematocrit 32.7 (L) 35.0 - 47.0 %    MCV 93 78 - 100 fl    MCH 28.2 26.5 - 33.0 pg    MCHC 30.3 (L) 31.5 - 36.5 g/dL    RDW 14.8 10.0 - 15.0 %    Platelet Count 110 (L) 150 - 450 10e9/L    Diff Method Automated Method     % Neutrophils 71.1 %    % Lymphocytes 19.7 %    % Monocytes 5.7 %    % Eosinophils 3.1 %    % Basophils 0.2 %    % Immature Granulocytes 0.2 %    Nucleated RBCs 0 0 /100    Absolute Neutrophil 3.7 1.6 - 8.3 10e9/L    Absolute Lymphocytes 1.0 0.8 - 5.3 10e9/L    Absolute Monocytes 0.3 0.0 - 1.3 10e9/L    Absolute Eosinophils 0.2 0.0 - 0.7 10e9/L    Absolute Basophils 0.0 0.0 - 0.2 10e9/L    Abs Immature Granulocytes 0.0 0 - 0.4 10e9/L    Absolute Nucleated RBC 0.0    Comprehensive metabolic panel   Result Value Ref Range    Sodium 138 133 - 144 mmol/L    Potassium 4.1 3.4 - 5.3 mmol/L    Chloride 104 94 - 109 mmol/L    Carbon Dioxide 27 20 - 32 mmol/L    Anion " Gap 7 3 - 14 mmol/L    Glucose 98 70 - 99 mg/dL    Urea Nitrogen 36 (H) 7 - 30 mg/dL    Creatinine 1.53 (H) 0.52 - 1.04 mg/dL    GFR Estimate 34 (L) >60 mL/min/1.7m2    GFR Estimate If Black 41 (L) >60 mL/min/1.7m2    Calcium 9.2 8.5 - 10.1 mg/dL    Bilirubin Total 0.2 0.2 - 1.3 mg/dL    Albumin 3.1 (L) 3.4 - 5.0 g/dL    Protein Total 7.8 6.8 - 8.8 g/dL    Alkaline Phosphatase 101 40 - 150 U/L    ALT 44 0 - 50 U/L    AST 24 0 - 45 U/L   Lipase   Result Value Ref Range    Lipase 190 73 - 393 U/L   Lactic acid whole blood   Result Value Ref Range    Lactic Acid 1.0 0.7 - 2.1 mmol/L   Ammonia   Result Value Ref Range    Ammonia 25 10 - 50 umol/L   Troponin I   Result Value Ref Range    Troponin I ES  0.000 - 0.045 ug/L     <0.015  The 99th percentile for upper reference range is 0.045 ug/L.  Troponin values in   the range of 0.045 - 0.120 ug/L may be associated with risks of adverse   clinical events.     UA reflex to Microscopic and Culture   Result Value Ref Range    Color Urine Yellow     Appearance Urine Slightly Cloudy     Glucose Urine Negative NEG mg/dL    Bilirubin Urine Negative NEG    Ketones Urine Negative NEG mg/dL    Specific Gravity Urine 1.010 1.003 - 1.035    Blood Urine Small (A) NEG    pH Urine 6.0 5.0 - 7.0 pH    Protein Albumin Urine 100 (A) NEG mg/dL    Urobilinogen mg/dL Normal 0.0 - 2.0 mg/dL    Nitrite Urine Negative NEG    Leukocyte Esterase Urine Large (A) NEG    Source Clean catch urine     RBC Urine 12 (H) 0 - 2 /HPF    WBC Urine 155 (H) 0 - 2 /HPF    WBC Clumps Present (A) NEG /HPF    Squamous Epithelial /HPF Urine <1 0 - 1 /HPF    Transitional Epi <1 0 - 1 /HPF    Mucous Urine Present (A) NEG /LPF    Hyaline Casts 1 0 - 2 /LPF   Glucose by meter   Result Value Ref Range    Glucose 108 (H) 70 - 99 mg/dL     *Note: Due to a large number of results and/or encounters for the requested time period, some results have not been displayed. A complete set of results can be found in Results Review.         Labs Ordered and Resulted from Time of ED Arrival Up to the Time of Departure from the ED   CBC WITH PLATELETS DIFFERENTIAL - Abnormal; Notable for the following:        Result Value    RBC Count 3.51 (*)     Hemoglobin 9.9 (*)     Hematocrit 32.7 (*)     MCHC 30.3 (*)     Platelet Count 110 (*)     All other components within normal limits   COMPREHENSIVE METABOLIC PANEL - Abnormal; Notable for the following:     Urea Nitrogen 36 (*)     Creatinine 1.53 (*)     GFR Estimate 34 (*)     GFR Estimate If Black 41 (*)     Albumin 3.1 (*)     All other components within normal limits   UA MACROSCOPIC WITH REFLEX TO MICRO AND CULTURE - Abnormal; Notable for the following:     Blood Urine Small (*)     Protein Albumin Urine 100 (*)     Leukocyte Esterase Urine Large (*)     RBC Urine 12 (*)     WBC Urine 155 (*)     WBC Clumps Present (*)     Mucous Urine Present (*)     All other components within normal limits   GLUCOSE BY METER - Abnormal; Notable for the following:     Glucose 108 (*)     All other components within normal limits   LIPASE   LACTIC ACID WHOLE BLOOD   AMMONIA   TROPONIN I   TACROLIMUS LEVEL   SIROLIMUS LEVEL   BLOOD CULTURE   URINE CULTURE AEROBIC BACTERIAL   BLOOD CULTURE     Medications   cefTRIAXone (ROCEPHIN) 1 g vial to attach to  mL bag for ADULTS or NS 50 mL bag for PEDS (not administered)   0.9% sodium chloride BOLUS (not administered)   0.9% sodium chloride BOLUS (0 mLs Intravenous Stopped 4/19/17 0105)   ondansetron (ZOFRAN) injection 4 mg (4 mg Intravenous Given 4/19/17 0002)   ondansetron (ZOFRAN) injection 4 mg (4 mg Intravenous Given 4/19/17 0036)   ketorolac (TORADOL) injection 15 mg (15 mg Intravenous Given 4/19/17 0145)        Assessments & Plan (with Medical Decision Making)       I have reviewed the nursing notes.  Emergency Department course:  The patient was seen and examined at 2345 pm.  EKG shows normal sinus rhythm rate of 77 bpm  Laboratory studies show renal  insufficiency, with creatinine of 1.53 and a GFR of 34.  CBC shows anemia, with a hemoglobin of 9.9, and thrombocytopenia, with platelet count of 110,000.  WBC is within normal limits at 5.2.  Lipase and lactic acid are both within normal limits.  Ammonia is 25.  UA has large LCE with 155 WBC and WBCs clumps and will be sent for culture.  Prior urine cultures are essentially pansensitive.  The patient is a 71 year-old female status post kidney transplant with altered mental status and signs of symptoms of a UTI.  Her mental status has improved somewhat during her ED course but she still remains altered.  She will be admitted to the medicine service for IV antibiotics and further evaluation.  I spoke to  regarding admission.  I treated her with Rocephin IV, Zofran IV, normal saline IV, and a small dose ofToradol IV.  Dr Thakur requested that I add on a tacrolimus and sirolimus level and this has been done in the ED.      I have reviewed the findings, diagnosis, plan and need for follow up with the patient.    New Prescriptions    No medications on file       Final diagnoses:   Altered mental status, unspecified altered mental status type   Urinary tract infection, site unspecified   Kidney replaced by transplant     I,Sukhwinder Encinas , am serving as a trained medical scribe to document services personally performed by Marielle Maldonado MD, based on the provider's statements to me.   IMarielle MD, was physically present and have reviewed and verified the accuracy of this note documented by Sukhwinder Encinas.  This note was created in part by the use of Dragon voice recognition dictation system. Inadvertent grammatical errors and typographical errors may still exist.  Marielle Maldonado MD      4/18/2017   [facility], EMERGENCY DEPARTMENT     Marielle Maldonado MD  04/19/17 0201

## 2017-04-20 ENCOUNTER — APPOINTMENT (OUTPATIENT)
Dept: OCCUPATIONAL THERAPY | Facility: CLINIC | Age: 72
DRG: 698 | End: 2017-04-20
Attending: INTERNAL MEDICINE
Payer: MEDICARE

## 2017-04-20 ENCOUNTER — APPOINTMENT (OUTPATIENT)
Dept: ULTRASOUND IMAGING | Facility: CLINIC | Age: 72
DRG: 698 | End: 2017-04-20
Payer: MEDICARE

## 2017-04-20 PROBLEM — A49.8 ENTEROCOCCUS FAECALIS INFECTION: Status: ACTIVE | Noted: 2017-04-20

## 2017-04-20 PROBLEM — A49.1 INFECTION DUE TO ENTEROCOCCUS: Status: ACTIVE | Noted: 2017-04-20

## 2017-04-20 LAB
ANION GAP SERPL CALCULATED.3IONS-SCNC: 8 MMOL/L (ref 3–14)
BACTERIA SPEC CULT: ABNORMAL
BUN SERPL-MCNC: 28 MG/DL (ref 7–30)
CALCIUM SERPL-MCNC: 9 MG/DL (ref 8.5–10.1)
CHLORIDE SERPL-SCNC: 113 MMOL/L (ref 94–109)
CO2 SERPL-SCNC: 22 MMOL/L (ref 20–32)
CREAT SERPL-MCNC: 1.01 MG/DL (ref 0.52–1.04)
ERYTHROCYTE [DISTWIDTH] IN BLOOD BY AUTOMATED COUNT: 14.8 % (ref 10–15)
GFR SERPL CREATININE-BSD FRML MDRD: 54 ML/MIN/1.7M2
GLUCOSE BLDC GLUCOMTR-MCNC: 150 MG/DL (ref 70–99)
GLUCOSE BLDC GLUCOMTR-MCNC: 173 MG/DL (ref 70–99)
GLUCOSE BLDC GLUCOMTR-MCNC: 186 MG/DL (ref 70–99)
GLUCOSE SERPL-MCNC: 150 MG/DL (ref 70–99)
HCT VFR BLD AUTO: 31.7 % (ref 35–47)
HGB BLD-MCNC: 9.8 G/DL (ref 11.7–15.7)
MCH RBC QN AUTO: 28.7 PG (ref 26.5–33)
MCHC RBC AUTO-ENTMCNC: 30.9 G/DL (ref 31.5–36.5)
MCV RBC AUTO: 93 FL (ref 78–100)
MICRO REPORT STATUS: ABNORMAL
MICROORGANISM SPEC CULT: ABNORMAL
MICROORGANISM SPEC CULT: ABNORMAL
PLATELET # BLD AUTO: 104 10E9/L (ref 150–450)
POTASSIUM SERPL-SCNC: 3.8 MMOL/L (ref 3.4–5.3)
RBC # BLD AUTO: 3.42 10E12/L (ref 3.8–5.2)
SIROLIMUS BLD-MCNC: 3.3 UG/L (ref 5–15)
SODIUM SERPL-SCNC: 144 MMOL/L (ref 133–144)
SPECIMEN SOURCE: ABNORMAL
TACROLIMUS BLD-MCNC: 3.1 UG/L (ref 5–15)
TME LAST DOSE: ABNORMAL H
TME LAST DOSE: ABNORMAL H
WBC # BLD AUTO: 4 10E9/L (ref 4–11)

## 2017-04-20 PROCEDURE — 25000132 ZZH RX MED GY IP 250 OP 250 PS 637: Mod: GY | Performed by: INTERNAL MEDICINE

## 2017-04-20 PROCEDURE — 00000146 ZZHCL STATISTIC GLUCOSE BY METER IP

## 2017-04-20 PROCEDURE — 80197 ASSAY OF TACROLIMUS: CPT | Performed by: INTERNAL MEDICINE

## 2017-04-20 PROCEDURE — 85027 COMPLETE CBC AUTOMATED: CPT | Performed by: INTERNAL MEDICINE

## 2017-04-20 PROCEDURE — 36415 COLL VENOUS BLD VENIPUNCTURE: CPT | Performed by: INTERNAL MEDICINE

## 2017-04-20 PROCEDURE — 25000131 ZZH RX MED GY IP 250 OP 636 PS 637: Mod: GY | Performed by: INTERNAL MEDICINE

## 2017-04-20 PROCEDURE — 12000001 ZZH R&B MED SURG/OB UMMC

## 2017-04-20 PROCEDURE — 87799 DETECT AGENT NOS DNA QUANT: CPT | Performed by: MARRIAGE & FAMILY THERAPIST

## 2017-04-20 PROCEDURE — 99233 SBSQ HOSP IP/OBS HIGH 50: CPT | Mod: GC | Performed by: INTERNAL MEDICINE

## 2017-04-20 PROCEDURE — 80048 BASIC METABOLIC PNL TOTAL CA: CPT | Performed by: INTERNAL MEDICINE

## 2017-04-20 PROCEDURE — 25000125 ZZHC RX 250: Performed by: INTERNAL MEDICINE

## 2017-04-20 PROCEDURE — A9270 NON-COVERED ITEM OR SERVICE: HCPCS | Mod: GY | Performed by: INTERNAL MEDICINE

## 2017-04-20 PROCEDURE — 40000133 ZZH STATISTIC OT WARD VISIT: Performed by: OCCUPATIONAL THERAPIST

## 2017-04-20 PROCEDURE — 97535 SELF CARE MNGMENT TRAINING: CPT | Mod: GO | Performed by: OCCUPATIONAL THERAPIST

## 2017-04-20 PROCEDURE — 76776 US EXAM K TRANSPL W/DOPPLER: CPT

## 2017-04-20 PROCEDURE — 36415 COLL VENOUS BLD VENIPUNCTURE: CPT | Performed by: MARRIAGE & FAMILY THERAPIST

## 2017-04-20 PROCEDURE — 25000128 H RX IP 250 OP 636: Performed by: INTERNAL MEDICINE

## 2017-04-20 PROCEDURE — 97165 OT EVAL LOW COMPLEX 30 MIN: CPT | Mod: GO | Performed by: OCCUPATIONAL THERAPIST

## 2017-04-20 PROCEDURE — 80195 ASSAY OF SIROLIMUS: CPT | Performed by: INTERNAL MEDICINE

## 2017-04-20 RX ORDER — TACROLIMUS 1 MG/1
1 CAPSULE ORAL
Status: DISCONTINUED | OUTPATIENT
Start: 2017-04-20 | End: 2017-04-20

## 2017-04-20 RX ORDER — FAMOTIDINE 20 MG/1
20 TABLET, FILM COATED ORAL 2 TIMES DAILY
Status: DISCONTINUED | OUTPATIENT
Start: 2017-04-20 | End: 2017-04-23

## 2017-04-20 RX ORDER — TACROLIMUS 1 MG/1
2 CAPSULE ORAL
Status: DISCONTINUED | OUTPATIENT
Start: 2017-04-20 | End: 2017-04-25

## 2017-04-20 RX ADMIN — TACROLIMUS 2 MG: 1 CAPSULE ORAL at 19:40

## 2017-04-20 RX ADMIN — LABETALOL HCL 200 MG: 200 TABLET, FILM COATED ORAL at 08:14

## 2017-04-20 RX ADMIN — AMPICILLIN SODIUM AND SULBACTAM SODIUM 3 G: 2; 1 INJECTION, POWDER, FOR SOLUTION INTRAMUSCULAR; INTRAVENOUS at 00:11

## 2017-04-20 RX ADMIN — AMPICILLIN SODIUM AND SULBACTAM SODIUM 3 G: 2; 1 INJECTION, POWDER, FOR SOLUTION INTRAMUSCULAR; INTRAVENOUS at 19:28

## 2017-04-20 RX ADMIN — LABETALOL HCL 200 MG: 200 TABLET, FILM COATED ORAL at 19:28

## 2017-04-20 RX ADMIN — AMPICILLIN SODIUM AND SULBACTAM SODIUM 3 G: 2; 1 INJECTION, POWDER, FOR SOLUTION INTRAMUSCULAR; INTRAVENOUS at 06:05

## 2017-04-20 RX ADMIN — AMPICILLIN SODIUM AND SULBACTAM SODIUM 3 G: 2; 1 INJECTION, POWDER, FOR SOLUTION INTRAMUSCULAR; INTRAVENOUS at 23:54

## 2017-04-20 RX ADMIN — PRAVASTATIN SODIUM 10 MG: 10 TABLET ORAL at 08:13

## 2017-04-20 RX ADMIN — ASPIRIN 81 MG: 81 TABLET, CHEWABLE ORAL at 08:13

## 2017-04-20 RX ADMIN — TACROLIMUS 1 MG: 1 CAPSULE ORAL at 08:13

## 2017-04-20 RX ADMIN — CLONIDINE HYDROCHLORIDE 0.1 MG: 0.1 TABLET ORAL at 19:28

## 2017-04-20 RX ADMIN — AMPICILLIN SODIUM AND SULBACTAM SODIUM 3 G: 2; 1 INJECTION, POWDER, FOR SOLUTION INTRAMUSCULAR; INTRAVENOUS at 12:33

## 2017-04-20 RX ADMIN — SIROLIMUS 1.5 MG: 1 TABLET, SUGAR COATED ORAL at 08:13

## 2017-04-20 RX ADMIN — INSULIN ASPART 1 UNITS: 100 INJECTION, SOLUTION INTRAVENOUS; SUBCUTANEOUS at 12:38

## 2017-04-20 RX ADMIN — FAMOTIDINE 20 MG: 20 TABLET ORAL at 19:28

## 2017-04-20 RX ADMIN — CLONIDINE HYDROCHLORIDE 0.1 MG: 0.1 TABLET ORAL at 08:13

## 2017-04-20 RX ADMIN — PREDNISONE 2.5 MG: 2.5 TABLET ORAL at 08:13

## 2017-04-20 RX ADMIN — SODIUM CHLORIDE: 9 INJECTION, SOLUTION INTRAVENOUS at 00:11

## 2017-04-20 RX ADMIN — LEVOTHYROXINE SODIUM 50 MCG: 50 TABLET ORAL at 08:13

## 2017-04-20 RX ADMIN — INSULIN ASPART 1 UNITS: 100 INJECTION, SOLUTION INTRAVENOUS; SUBCUTANEOUS at 08:14

## 2017-04-20 RX ADMIN — FAMOTIDINE 20 MG: 20 TABLET ORAL at 08:14

## 2017-04-20 NOTE — PLAN OF CARE
Problem: Goal Outcome Summary  Goal: Goal Outcome Summary  Outcome: No Change  Disoriented to time and situation at last assessment.  VSS with elevated BP per baseline.  2L via nasal cannula.  Verbally responded to writer during second neuro assessment- responses were logical, however pt could not follow all simple commands.  Chronic rico for neurogenic bladder- intact and draining adequately.   over night.  Bed alarm active and audible.  Continue with POC and notify provider with any changes or concerns.

## 2017-04-20 NOTE — PLAN OF CARE
Problem: Goal Outcome Summary  Goal: Goal Outcome Summary  Outcome: No Change  Altered mental Status. Pt is lethargic and arouses to voice, however only answers by shaking her head yes or no. Chronic rico for neurogenic bladder, rico was swapped this AM. Diabetic, BGs  213 and 202. IV abx infused w/o complication. Bed alarm active and audible.      Continue to monitor and follow the POC.

## 2017-04-20 NOTE — PLAN OF CARE
Problem: Goal Outcome Summary  Goal: Goal Outcome Summary  Outcome: Improving  D/I/A: Patient A & O X 2, able to answer questions , with  Slow responded VSS no respiratory Distress not. Johana pain. Patient with poor appetite at about 25% breakfast and stated does not like hospital food, and likes home food. But able to drink fluids. BS , 150, and 173. Received scheduled medications with out problem.  Johnson patent with adequate out put. No bowel movement on this shift. Repositioned, q 2hr and tolerated ok. Call light in reach and bed alarm on for safety. Continue monitor mental status, I/O and update MD with concerns.

## 2017-04-20 NOTE — PLAN OF CARE
Problem: Goal Outcome Summary  Goal: Goal Outcome Summary  5B-PT: Per discussion with OT and chart review, pt appears to be at baseline for functional mobility (lift dependent). PT to defer acute therapy needs to OT at this time.

## 2017-04-20 NOTE — PLAN OF CARE
Problem: Goal Outcome Summary  Goal: Goal Outcome Summary  OT/5B: Eval completed and treatment initiated. Pt participated in self cares with HOB elevated requiring min A with VCs. Pt limited by cognition and decreased activity tolerance.    REC: D/C home with HC

## 2017-04-20 NOTE — PROGRESS NOTES
Care Coordinator Progress Note     Admission Date/Time:  4/18/2017  Attending MD:  Prudencio Linares MD     Data  Chart reviewed, discussed with interdisciplinary team.   Patient was admitted for:    Altered mental status, unspecified altered mental status type  Urinary tract infection, site unspecified  Kidney replaced by transplant  Recurrent UTI.    Concerns with insurance coverage for discharge needs: .  Current Living Situation: Patient lives with spouse.  Support System: Supportive and Involved  Services Involved: DME and Home Care  Transportation: Agency transportation. Stretcher transport  Barriers to Discharge: chronically ill, cognitively impaired and dependent with mobility/activities of daily living    Coordination of Care and Referrals: Provided patient/family with options for none at this time..        Assessment  Spoke with patient at bedside.  Pt is a 71 year old female with PMH significant for kidney transplant and chronic indwelling rico with recurrent UTIs who was admitted with altered mental status.  Patient is slightly confused today, but answering questions.  Upon chart review, patient has a lift device and wheelchair at home, however [facility] report patient has been bed bound and does not get up.  Patient is currently receiving [facility] for RN and HHA.  PT eval was pending.  Resumption orders placed.  Will need stretcher transport upon discharge.  Discussed possibility of suprapubic catheter with Dr. Green as patient has needed multiple rico catheter changes prior to admission and during admission due to leaking.     Plan  Anticipated Discharge Date:  TBD  Anticipated Discharge Plan:  Home with home care    Bailey Aguilar RN, BSN  Care Coordinator [facility] [address]  Pager: [phone]  Phone: [phone]

## 2017-04-20 NOTE — CONSULTS
[facility]  Transplant Infectious Disease Consult Note - New Patient     Patient:  Gem Jade, Date of birth 1945, Medical record number [id number]  Date of Visit:  04/20/2017  Consult requested by Dr. Miguelito Green for evaluation of possible UTI.         Assessment and Recommendations:   Recommendations:  - agree with ampicillin/sulbactam while awaiting culture results  - renal transplant ultrasound  - consult renal transplant team regarding appropriate immunosuppression  - please check serum PCR for CMV, EBV, and BK virus    Thank you very much for this consultation. Transplant Infectious Disease will continue to follow with you.    Assessment:  Ms. Jade is a 71 year old female with PMH of DM2 with neuropathy, retinopathy and nephropathy s/p kidney transplant in 1999 on sirolimus, tacrolimus and prednisone, chronic indwelling catheter with recurrent UTIs (4x/year), and bed bound due to polyneuropathy and vertigo admitted on 4/18 with altered mental status found to have concern for UTI.     Infectious Disease issues include:  - Complicated UTI - Patient with altered mental status and no other focal signs of infection. Has neurogenic bladder with poor sensation, so has not noticed any urinary symptoms. Johnson replaced on 4/20/2017. UA with 155 WBC and large LE (no nitrities). Urine culture pending with 50-100K of 2 strains of Enterococcus species. Hx of recurrent UTIs with previous ESBL-producing bacteria (12/2015) and more recent susceptible isolates.  - Altered Mental Status - patient with altered mental status at baseline per review of recent PCP and pharmacist notes, however acute change reported per  on admission. Currently is oriented and verbose without focal signs concerning for infection or acute CNS event. Likely worsened due to infection in addition to baseline confusion.  - QTc interval: 445msec (4/18/2017)  - Bacterial prophylaxis: None  - Pneumocystis  "prophylaxis: None  - Viral serostatus & prophylaxis: CMV+, EBV+, no ppx currently  - Fungal prophylaxis: None  - Immunization status: Up to date  - Gamma globulin status: Unknown  - Isolation status:  Good hand hygiene. Contact for history of ESBL-producing E coli (urine 12/2015).    Transplant ID will continue to follow. Patient seen and discussed with Dr. Johanny Boland, ID staff attending. Recommendations discussed with Dr. Green.    Khloe Nolasco MD, [id number]   Fellow in Pediatric and Adult Infectious Disease  pager:  [phone]         History of Infectious Disease Illness:   Ms. Jade is a 71 year old female with PMH of DM2 with neuropathy, retinopathy and nephropathy s/p kidney transplant in 1999 on sirolimus, tacrolimus and prednisone, chronic indwelling catheter with recurrent UTIs (4x/year), and bed bound due to polyneuropathy and vertigo admitted on 4/18 with altered mental status found to have concern for UTI.     Ms. Jade is tangential and distractible during the exam, but appears oriented. History obtained from discussion with Ms. Jade and review of EMR in Epic. Currently she feels well, but perseverates on how her  \"lied to her\" and cannot elaborate. She denied any fever, chills, sweats, headache, vision changes (needs a magnifying glass to read), ear pain, runny nose, sore throat, cough, trouble breathing, nausea, vomiting, diarrhea, discomfort with her rico, joint pain, rash, or other focal symptoms. She has chronic tinnitus and pain in her knees, L>R that is not different than usual.      Transplants:  10/27/1999 (Kidney), Postoperative day:  6385.  Coordinator Jorgito Jacinto    Review of Systems:   CONSTITUTIONAL:  No fevers or chills.  EYES: negative for icterus or acute vision changes  ENT: positive for tinnitus in left ear, negative for acute hearing loss, runny nose, sore throat  RESPIRATORY:  negative for cough, sputum or dyspnea  CARDIOVASCULAR:  negative for chest pain, " palpitations  GASTROINTESTINAL:  negative for nausea, vomiting, diarrhea or constipation  GENITOURINARY:  negative for dysuria or hematuria, rico in place  HEME:  No easy bruising or bleeding  INTEGUMENT:  negative for rash or pruritus  NEURO:  Negative for headache or tremor    Past Medical History:   Diagnosis Date     Background diabetic retinopathy(362.01)     extensive diabetic retinopathy, s/p multiple laser treatments     Diabetic gastroparesis (H)     followed by [facility] (Dr. 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)      Urinary tract infection, site not specified     Chronic UTIs     UTI (urinary tract infection) due to urinary indwelling catheter (H)        Past Surgical History:   Procedure Laterality Date     C NONSPECIFIC PROCEDURE  10/99    kidney transplant     C NONSPECIFIC PROCEDURE  8/00    MRI head, lumbar spine, pelvis     C TOTAL KNEE ARTHROPLASTY  3/03    Knee Replacement, Total right, post op PE     CHOLECYSTECTOMY       HC LEFT HEART CATHETERIZATION  1999    Cardiac Cath, Left Heart, Negative  (@ [facility])     HC LEFT HEART CATHETERIZATION  4/05    normal coronary angi[facility], had mild troponin rise (0.71)     HC REMV CATARACT EXTRACAP,INSERT LENS  8/04    bilateral cataract repaired, left eye        Family History   Problem Relation Age of Onset     DIABETES Mother      DIABETES Brother      Hypertension Father      [id number] pneumonia     HEART DISEASE Father        Social History     Social History Narrative      Social History   Substance Use Topics     Smoking status: Former Smoker     Years: 17.00     Quit date: 6/1/1987     Smokeless tobacco: Not on file     Alcohol use No       Immunization History   Administered Date(s) Administered     Hepatitis B 01/13/2003     Influenza (H1N1) 11/25/2009     Influenza (High Dose) 3 valent vaccine 12/01/2015, 10/07/2016     Influenza (IIV3) 11/30/2000, 11/04/2002, 11/14/2003, 10/22/2004, 11/03/2005, 12/04/2006, 11/12/2007, 10/21/2008, 10/06/2009, 11/09/2010, 11/05/2011     Influenza Vaccine IM 3yrs+ 4 Valent IIV4 10/16/2013     Pneumococcal (PCV 13) 12/01/2015     Pneumococcal 23 valent 06/17/2013     TDAP Vaccine (Adacel) 06/17/2013     Zoster vaccine, live 06/17/2013       Patient Active Problem List   Diagnosis     Kidney replaced by transplant     Primary localized osteoarthrosis, lower leg     Pulmonary embolism and infarction (H)     Diabetes mellitus with background retinopathy (H)     TYPE 2 DIABETES, HBA1C GOAL < 8%     HYPERLIPIDEMIA LDL GOAL <100     Diabetic gastroparesis (H)     Back pain     Compression fx, thoracic spine (H)     Hyperkalemia     Anemia     Clostridium difficile diarrhea     Hydronephrosis     Recurrent UTI     Benign essential hypertension     Hyponatremia     Vancomycin resistant Enterococcus     Clostridium difficile colitis     Diabetic polyneuropathy associated with type 2 diabetes mellitus (H)     Chronic shoulder pain, unspecified laterality     Morbid obesity, unspecified obesity type (H)     Health Care Home     Gastroenteritis     Atrial fibrillation, transient (H)     Altered mental status     Acute kidney injury (H)     Pancytopenia, acquired (H)     Obstructive sleep apnea syndrome     ROB (acute kidney injury) (H)     UTI (urinary tract infection)            Current Medications & Allergies:       famotidine  20 mg Oral BID     aspirin chewable tablet 81 mg  81 mg Oral Daily     cloNIDine  0.1 mg Oral BID     labetalol  200 mg Oral BID      levothyroxine  50 mcg Oral Daily     predniSONE  2.5 mg Oral Daily     tacrolimus  1 mg Oral BID IS     pravastatin  10 mg Oral Daily     sirolimus  1.5 mg Oral Daily     insulin aspart  1-7 Units Subcutaneous TID AC     insulin aspart  1-5 Units Subcutaneous At Bedtime     ampicillin-sulbactam (UNASYN) IV  3 g Intravenous Q6H HO       Infusions/Drips: None       Allergies   Allergen Reactions     Bactrim Ds [Sulfamethoxazole W/Trimethoprim]      Creatinine level increased     Atorvastatin Calcium      myalgias, muscle aches     Codeine Nausea and Vomiting     Darvocet [Propoxyphene N-Apap] Nausea and Vomiting     Hydromorphone      Levofloxacin Other (See Comments) and Diarrhea     hallucinations     Morphine      Nausea and vomiting     Niaspan [Niacin] GI Disturbance     Flushing even at low dose, GI upset     Percocet [Oxycodone-Acetaminophen]      Vomiting after taking med couple of times     Vicodin [Hydrocodone-Acetaminophen] Nausea and Vomiting            Physical Exam:   Vitals were reviewed.  All vitals stable.  Patient Vitals for the past 24 hrs:   BP Temp Temp src Pulse Resp SpO2   04/20/17 1224 155/52 97.2  F (36.2  C) Oral 69 16 98 %   04/20/17 0834 139/55 - - - - -   04/20/17 0731 195/83 97.2  F (36.2  C) Oral 66 16 100 %   04/20/17 0302 182/70 97.8  F (36.6  C) Oral - 14 100 %   04/19/17 2202 189/71 98.6  F (37  C) Axillary - - 100 %   04/19/17 2043 - 99.1  F (37.3  C) Axillary - 14 100 %   04/19/17 2009 154/65 - - - - -   04/19/17 1458 173/58 98.1  F (36.7  C) Axillary - 18 100 %   04/19/17 1427 189/61 99.1  F (37.3  C) Oral - 16 100 %     Ranges for vital signs:  Temp:  [97.2  F (36.2  C)-99.1  F (37.3  C)] 97.2  F (36.2  C)  Pulse:  [66-69] 69  Heart Rate:  [66-80] 66  Resp:  [14-18] 16  BP: (139-195)/(52-83) 155/52  SpO2:  [98 %-100 %] 98 %  Vitals:    04/19/17 0715   Weight: 205 lb 12.8 oz (93.4 kg)       Physical Examination:   GENERAL:  well-developed, well-nourished, in bed in no acute  distress.  HEAD:  Head is normocephalic, atraumatic   EYES:  Eyes have anicteric sclerae without conjunctival injection   ENT:  Oropharynx is moist without exudates or ulcers. Tongue is midline.  NECK:  Supple. No cervical lymphadenopathy.  LUNGS:  Clear to auscultation bilaterally.  CARDIOVASCULAR:  Regular rate and rhythm with no murmurs, gallops or rubs.  ABDOMEN:  Normal bowel sounds, soft, nontender. No appreciable hepatosplenomegaly.  SKIN:  No acute rashes. Well healed scars of bilateral knees. Line in place without any surrounding erythema or exudate.  NEUROLOGIC:  Alert and oriented to place and self. Tangential speech at times with lags. Grossly nonfocal. Active x4 extremities         Laboratory Data:     Absolute CD4   Date Value Ref Range Status   02/17/2017 379 (L) 441 - 2156 cells/uL Final       Inflammatory Markers  Recent Labs   Lab Test  03/08/17   1243  08/31/16   0801  05/25/16   2116  02/18/16   1436  04/28/15   1650  12/29/14   1330   SED   --    --   53*   --    --    --    CRP  7.5  39.0*  <2.9  8.8*  145.0*  114.0*       Metabolic Studies     Recent Labs   Lab Test  04/20/17   0714  04/19/17   0005   03/09/17   1310  03/09/17   0750  03/08/17   1243   02/16/17   2303   12/05/16   1210   08/31/16   0801   08/29/16   2157   08/07/12   0647   NA  144  138   < >   --   141  135   < >  131*   < >   --    < >  137   < >  133   < >  135   POTASSIUM  3.8  4.1   < >   --   4.1  4.6   < >   --    < >   --    < >  3.8   < >  4.5   < >  5.9*   CHLORIDE  113*  104   < >   --   107  98   < >   --    < >   --    < >  106   < >  94   < >  104   CO2  22  27   < >   --   26  28   < >   --    < >   --    < >  23   < >  32   < >  22   ANIONGAP  8  7   < >   --   8  8   < >   --    < >   --    < >  8   < >  7   < >  8   BUN  28  36*   < >   --   39*  42*   < >   --    < >   --    < >  29   < >  47*   < >  21   CR  1.01  1.53*   < >   --   2.94*  3.37*   < >   --    < >   --    < >  0.98   < >  1.70*   < >  1.49*    GFRESTIMATED  54*  34*   < >   --   16*  13*   < >   --    < >   --    < >  56*   < >  30*   < >  35*   GLC  150*  98   < >   --   138*  61*   < >   --    < >   --    < >  146*   < >  199*   < >  79   A1C   --    --    --    --    --    --    --    --    --   7.4*   --    --    --    --    < >   --    ROGER  9.0  9.2   < >   --   8.9  10.2*   < >   --    < >   --    < >  9.2   < >  10.6*   < >  8.6   PHOS   --    --    --    --   3.2   --    < >  2.9   --    --    --    --    --    --    < >   --    MAG   --    --    --    --    --    --    --   2.0   --    --    < >   --    --    --    < >   --    LACT   --   1.0   --    --    --   0.6*   --    --    < >   --    --    --    --    --    < >   --    PCAL   --    --    --    --    --    --    --    --    --    --    --   0.09   < >   --    < >   --    FGTL   --    --    --    --    --    --    --    --    --    --    --    --    --   459   --    --    CKT   --    --    --   36   --    --    --    --    --    --    --    --    --    --    --   <20*    < > = values in this interval not displayed.       Hepatic Studies  Recent Labs   Lab Test  04/19/17   0005  03/22/17   1130  03/09/17   0750  03/08/17   1243   09/03/16   1329  08/30/16   1709   09/03/13   0725   BILITOTAL  0.2  0.1*   --   0.3   < >   --   0.5   < >  0.3   BILIDELTA   --    --    --    --    --    --    --    --   0.3   BILICONJ   --    --    --    --    --    --    --    --   0.0   DBIL   --   <0.1   --    --    --    --    --    < >   --    ALKPHOS  101  77   --   87   < >   --   69   < >  77   PROTTOTAL  7.8  7.6   --   7.9   < >   --   7.1   < >  5.9*   ALBUMIN  3.1*  3.0*  2.7*  3.0*   < >   --   3.0*   < >  2.6*   AST  24  25   --   13   < >   --   8   < >  17   ALT  44  29   --   <6   < >   --   8   < >  27   LDH   --    --    --    --    --   150  157   --    --     < > = values in this interval not displayed.       Pancreatitis testing  Recent Labs   Lab Test  04/19/17   0005  12/05/16   1213    09/02/13   0053  06/29/13   1711   LIPASE  190   --    --   92  125   TRIG   --   272*   < >   --    --     < > = values in this interval not displayed.       Gout Labs      Recent Labs   Lab Test  02/17/17   2358   URIC  7.5*       Hematology Studies    Recent Labs   Lab Test  04/20/17   0714  04/19/17   0005  04/03/17   1200  03/22/17   1130  03/15/17   0731  03/14/17   0701   WBC  4.0  5.2  4.3  4.7  4.6  5.1   ANEU   --   3.7   --   2.9   --    --    ALYM   --   1.0   --   1.3   --    --    CARROLL   --   0.3   --   0.4   --    --    AEOS   --   0.2   --   0.2   --    --    HGB  9.8*  9.9*  9.3*  9.3*  8.4*  8.7*   HCT  31.7*  32.7*  29.8*  29.8*  27.3*  28.3*   PLT  104*  110*  143*  124*  115*  103*       Clotting Studies    Recent Labs   Lab Test  03/08/17   1243  09/02/16   0838  09/01/16   1800  08/31/16   0801   08/07/16   0750   INR  1.01  1.09  1.13  0.98   < >  1.12   PTT   --    --    --    --    --   38*    < > = values in this interval not displayed.       Iron Testing  Recent Labs   Lab Test  04/20/17   0714   03/09/17   1310   02/17/17   2358   09/04/16   0815  09/03/16   1329   08/29/16   2316   08/07/16   0750   IRON   --    --    --    --   50   --   124   --    --    --    --   12*   FEB   --    --    --    --   170*   --   128*   --    --    --    --   166*   IRONSAT   --    --    --    --   29   --   97*   --    --    --    --   7*   GIANA   --    --    --    --   1069*   --   2676*   --    --    --    --   645*   MCV  93   < >   --    < >   --    < >  94   --    < >   --    < >  106*   FOLIC   --    --   5.2*   --   8.5   --    --    --    < >   --    --   10.4   B12   --    --   1402*   --   1031*   --    --    --    < >   --    --   131*   HAPT   --    --    --    --    --    --    --   94   < >   --    --    --    RETP   --    --    --    --    --    --    --    --    --   0.3*   --    --    RETICABSCT   --    --    --    --    --    --    --    --    --   7.4*   --    --     < > = values in  this interval not displayed.     Arterial Blood Gas Testing  Recent Labs   Lab Test  01/10/16   1455  03/18/14   1120  03/18/14   0115  05/19/13   0705 07/13/12   PH  7.40  7.42  7.42  7.36   --    PCO2  49*  40  43  50*   --    PO2  323*  176*  245*  126*   --    HCO3  30*  26  28  29*  <10   O2PER  58   --   15LPR  3LPM   --         Thyroid Studies     Recent Labs   Lab Test  03/09/17   1310  08/30/16   0032  05/26/16   0744  03/18/14   1138  06/29/13   2153   TSH  1.20  1.01  0.96  0.68  1.30   T4  0.72*   --   1.00   --    --        Urine Studies   Recent Labs   Lab Test  04/19/17   0019  03/08/17   1300  03/03/17   1800  02/16/17   2036  02/13/17   1845   URINEPH  6.0  6.5  8.0*  6.5  6.0   NITRITE  Negative  Negative  Negative  Positive*  Positive*   LEUKEST  Large*  Large*  Large*  Large*  Large*   WBCU  155*  >182*  43*  >182*  >182*       Medication levels  Recent Labs   Lab Test  04/20/17   0714   08/29/13   1310  07/16/13   1220   VANCOMYCIN   --    --   <5.0 Traditional Dosing therapeutic Range:         Trough 8-20 mg/L         Peak 20-50 mg/L   --    TACROL  3.1*   < >   --   3.6*   RAPAMY  3.3*   < >   --    --    MPACID   --    --    --   3.13   MPAG   --    --    --   98.3*    < > = values in this interval not displayed.       CSF testing   Recent Labs   Lab Test  09/02/16   0940   CWBC  0  Duplicate request  Test not indicated.     CRBC  2  Duplicate request  Test not indicated.     CGLU  89*   CTP  44       Microbiology:  Beta D Glucan levels (Fungitell assay)    Recent Labs   Lab Test  08/29/16   2157   FGTL  459       Last 6 Culture results with specimen source  Culture Micro   Date Value Ref Range Status   04/19/2017 No growth after 1 day  Final   04/19/2017 (A)  Final    50,000 to 100,000 colonies/mL Enterococcus faecalis  50,000 to 100,000 colonies/mL Strain 2 Enterococcus faecalis  Susceptibility testing in progress     04/19/2017 No growth after 1 day  Final   03/08/2017 No growth  Final    03/08/2017 (A)  Final    >100,000 colonies/mL Enterococcus faecalis  >100,000 colonies/mL Candida albicans / dubliniensis Candida albicans and Candida   dubliniensis are not routinely speciated Susceptibility testing not routinely   done     03/08/2017 No growth  Final    Specimen Description   Date Value Ref Range Status   04/19/2017 Blood Left Arm  Final   04/19/2017 Unspecified Urine  Final   04/19/2017 Blood Right Arm  Final   03/08/2017 Blood Left Arm  Final   03/08/2017 Catheterized Urine  Final   03/08/2017 Blood Right Arm  Final        Last check of C difficile  C Diff Toxin B PCR   Date Value Ref Range Status   03/07/2017  NEG Final    Negative  Negative: Clostridium difficile target DNA sequences NOT detected, presumed   negative for Clostridium difficile toxin B or the number of bacteria present   may be below the limit of detection for the test.   FDA approved assay performed using [employer] real-time PCR.   A negative result does not exclude actual disease due to Clostridium difficile   and may be due to improper collection, handling and storage of the specimen or   the number of organisms in the specimen is below the detection limit of the   assay.         Infectious Disease Testing     Recent Labs   Lab Test  03/10/17   0920   TREPAB  Negative     Virology:  CMV viral loads    Recent Labs   Lab Test  03/09/17   1310  09/02/16   0838  08/31/16   0801  05/28/16   0743  07/18/12   0643   CSPEC  Plasma, EDTA anticoagulant  EDTA PLASMA  Plasma  CORRECTED ON 09/01 AT 1330: PREVIOUSLY REPORTED AS Blood    Plasma, EDTA anticoagulant  Whole blood, EDTA anticoagulant   CMVLOG  Not Calculated  <2.1  Not Calculated  Not Calculated   --        Log IU/mL of CMVQNT   Date Value Ref Range Status   03/09/2017 Not Calculated <2.1 [Log_IU]/mL Final   09/02/2016 <2.1 <2.1 [Log_IU]/mL Final   08/31/2016 Not Calculated <2.1 [Log_IU]/mL Final   05/28/2016 Not Calculated <2.1 [Log_IU]/mL Final       EBV DNA  Copies/mL   Date Value Ref Range Status   03/10/2017 32426 (A) EBVNEG [Copies]/mL Final   02/17/2017 428913 (A) EBVNEG [Copies]/mL Final   08/30/2016 2393 (A) EBVNEG [Copies]/mL Final   05/28/2016 9498 (A) EBVNEG [Copies]/mL Final       BK viral loads   Recent Labs   Lab Test  03/09/17   1310  02/17/17   2358  10/12/12   0713  11/07/10   1250   BKSPEC  Plasma, EDTA anticoagulant  CORRECTED ON 03/09 AT 1319: PREVIOUSLY REPORTED AS Plasma    Plasma  Plasma, EDTA anticoagulant  Plasma, EDTA anticoagulant   BKRES  BK Virus DNA Not Detected  BK Virus DNA Not Detected  <1000  <1000       Parvovirus Testing  Recent Labs   Lab Test  09/02/16   0838   PRVG  0.28   PRVM  0.11   PRVSP  EDTA PLASMA   PRVPC  Not Detected  (Note)  NOT DETECTED - A negative result does not rule out the  presence of PCR inhibitors in the patient specimen or assay  specific nucleic acid in concentrations below the level of  detection by the assay.  INTERPRETIVE INFORMATION: Parvovirus B19 By PCR  Test developed and characteristics determined by [facility]. See Compliance Statement B: [employer]  Performed by [facility],  [address] [place] [id number] [phone]  [email or web address], Fco Taveras MD, Lab. Director         Hepatitis B Testing   Recent Labs   Lab Test  03/09/17   1310   HEPBANG  Nonreactive      Hepatitis C Antibody   Date Value Ref Range Status   03/09/2017  NR Final    Nonreactive   Assay performance characteristics have not been established for newborns,   infants, and children     09/19/2002 Negative NEG Final       CMV Antibody IgG   Date Value Ref Range Status   09/01/2016 (H) 0.0 - 0.8 AI Final    >8.0  Positive   Antibody index (AI) values reflect qualitative changes in antibody   concentration that cannot be directly associated with clinical condition or   disease state.       CMV Antibody IgM   Date Value Ref Range Status   09/01/2016 2.7 (H) 0.0 - 0.8 AI Final     Comment:     Positive   Antibody index  (AI) values reflect qualitative changes in antibody   concentration that cannot be directly associated with clinical condition or   disease state.

## 2017-04-20 NOTE — PROGRESS NOTES
04/20/17 1338   Quick Adds   Type of Visit Initial Occupational Therapy Evaluation   Living Environment   Lives With spouse   Living Arrangements house  (Split level)   Home Accessibility stairs within home;stairs to enter home   Number of Stairs to Enter Home 2   Number of Stairs Within Home 7  (Chair lift accessible to both floors)   Stair Railings at Home inside, present on right side  (Chair lift accessible on both floors)   Transportation Available van, wheelchair accessible;family or friend will provide;ambulance   Self-Care   Dominant Hand right   Usual Activity Tolerance fair   Current Activity Tolerance poor   Regular Exercise no   Equipment Currently Used at Home lift device;wheelchair   Functional Level Prior   Ambulation 4-->completely dependent   Transferring 3-->assistive equipment and person   Toileting 3-->assistive equipment and person   Bathing 3-->assistive equipment and person   Dressing 2-->assistive person   Eating 0-->independent   Communication 0-->understands/communicates without difficulty   Swallowing 0-->swallows foods/liquids without difficulty   Cognition 0 - no cognition issues reported   Fall history within last six months no   Which of the above functional risks had a recent onset or change? cognition;toileting       Present no   General Information   Onset of Illness/Injury or Date of Surgery - Date 04/18/17   Referring Physician Miguelito Green MD   Patient/Family Goals Statement Return home   Additional Occupational Profile Info/Pertinent History of Current Problem Gem Jade is a 71 year old female with history significant for kidney transplant (1999), neurogenic bladder , complex UTIs, morbid obesity, mobility issues, fatigue, DM-II, chronic pain, hypothyroidism, HTN, HLD, recently admitted 3/8-3/15 for acute kidney injury and altered mental status     Precautions/Limitations fall precautions   Weight-Bearing Status - LUE full weight-bearing    Weight-Bearing Status - RUE full weight-bearing   Weight-Bearing Status - LLE full weight-bearing   Weight-Bearing Status - RLE full weight-bearing   Heart Disease Risk Factors Overweight   General Observations Pt supine in bed upon arrival   General Info Comments Activity order: Up with assist   Cognitive Status Examination   Orientation place   Level of Consciousness alert   Able to Follow Commands success, 1-step commands   Personal Safety (Cognitive) mild impairment;fully aware of deficits;unaware of cognitive deficits   Memory intact   Attention Distractible during evaluation;Sustained attention impaired;Alternating attention impaired, difficulty shifting between tasks;Divided attention impaired, difficulty with simultaneous tasks   Organization/Problem Solving No deficits were identified   Executive Function Working memory impaired, decreased storage of information for performing tasks   Cognitive Comment Cognition impaired. Pt kept reporting she could hear people talking while no voices were detected within range, pt continued to lose train of thought and verbalize her thoughts   Visual Perception   Visual Perception Wears glasses  (Does not currently wear, states uses magnifying glass)   Sensory Examination   Sensory Quick Adds No deficits were identified   Pain Assessment   Patient Currently in Pain Yes, see Vital Sign flowsheet  (Pt reports pain in IV)   Integumentary/Edema   Integumentary/Edema other (describe)   Integumentary/Edema Comments Knees   Posture   Posture not impaired   Range of Motion (ROM)   ROM Quick Adds No deficits were identified   ROM Comment BUE WNL   Strength   Manual Muscle Testing Quick Adds No deficits were identified   Strength Comments BUE grossly 3/5   Muscle Tone Assessment   Muscle Tone Quick Adds No deficits were identified   Coordination   Upper Extremity Coordination No deficits were identified   Mobility   Bed Mobility Comments Pt was only able to sit up with bed  "elevated to support back   Grooming   Level of [place]: Grooming minimum assist (75% patients effort)   Physical Assist/Nonphysical Assist: Grooming verbal cues;supervision;set-up required  (Pt used wash cloth to wash arms and brushed hair)   Instrumental Activities of Daily Living (IADL)   Previous Responsibilities (N/A)   IADL Comments  and aide take care of all IADLs. Bed bound and no previous responsibilities   Activities of Daily Living Analysis   Impairments Contributing to Impaired Activities of Daily Living cognition impaired   ADL Comments  and aide assists with all ADLs   General Therapy Interventions   Planned Therapy Interventions cognition;ADL retraining;strengthening   Clinical Impression   Criteria for Skilled Therapeutic Interventions Met yes, treatment indicated   OT Diagnosis Decreased ADL IND   Influenced by the following impairments Impaired cognition, decreased activity tolerance, strength, and bed mobility   Assessment of Occupational Performance 1-3 Performance Deficits   Identified Performance Deficits dressing, home management   Clinical Decision Making (Complexity) Low complexity   Therapy Frequency 5 times/wk   Predicted Duration of Therapy Intervention (days/wks) 2 weeks   Anticipated Equipment Needs at Discharge (chair lift and wheelchair at home)   Anticipated Discharge Disposition Home with Assist   Risks and Benefits of Treatment have been explained. Yes   Patient, Family & other staff in agreement with plan of care Yes   [facility] AM-PAC  \"6 Clicks\" Daily Activity Inpatient Short Form   1. Putting on and taking off regular lower body clothing? 1 - Total   2. Bathing (including washing, rinsing, drying)? 2 - A Lot   3. Toileting, which includes using toilet, bedpan or urinal? 2 - A Lot   4. Putting on and taking off regular upper body clothing? 2 - A Lot   5. Taking care of personal grooming such as brushing teeth? 3 - A Little   6. Eating meals? 4 - None "   Daily Activity Raw Score (Score out of 24.Lower scores equate to lower levels of function) 14   Total Evaluation Time   Total Evaluation Time (Minutes) 5

## 2017-04-20 NOTE — PROGRESS NOTES
"[facility] Internal Medicine Progress Note     Gem Jade  MRN: [id number]  YOB: 1945    Today's Date: April 20, 2017        Interval History:     Pt is much more awake today.  She is still somewhat confused but able to follow commands and participate mostly in a conversation.  Denies CP, abd pain, SOB.  Remains afebrile.  Stable vitals other than hypertensive.  Over 2 L of UOP.  On further discussion with , pt has been immobile for the past several years.     4 pt ROS negative other than that noted above.          Physical Exam:   Vitals were reviewed  Blood pressure 155/52, pulse 69, temperature 97.2  F (36.2  C), temperature source Oral, resp. rate 16, height 1.702 m (5' 7.01\"), weight 93.4 kg (205 lb 12.8 oz), SpO2 98 %, not currently breastfeeding.    Physical Exam:    General: Awake, alert, NAD, laying in bed  HEENT: Oral mucosa moist,  EOM grossly intact, anicteric sclerae  Neck: Supple, no LAD  CV: RRR, normal S1S2, no murmur, clicks, rubs  Resp: CTAB, no wheezes, rhonchi   Abd: Soft, suprapubic tenderness to deep palpation, nondistended, no rebound or guarding, BS+, no masses appreciated  Extremities: WWP, no pedal edema or cyanosis  Neuro: awake, alert and oriented to person and place, but not time,  CN's grossly intact, 5/5 motor BUE and BLE, No lateralizing symptoms or focal deficits           Data:     All labs and imaging reviewed         Assessment and Plan:   Assessment:    Gem Jade is a 72 yo F with a history of kidney transplant (1999), neurogenic bladder with chronic rico, recurrent UTI's that present with AMS, T2DM, hypertension, and hypothyroid who was admitted with altered mental status and UTI, clinically improving.       # Complicated Urinary tract infection  # Neurogenic bladder w/ chronic rico  History of recurrent UTIs, most recent admission in March presenting with confusion. Previous urine cultures have grown e faecalis, e coli, and klebsiella, " most of which were either pan-sensitive or resistant to ampicillin or TMP-SMX.  She did have a UTI with ESBL E. Coli in February 2016.  This admission UA with 155 WBCs. S/p ceftriaxone x 1 in ED and 2 L NS.  Urine culture growing 2 strains of Enterococcus faecalis, susceptibilities pending.  BCx NGTD.  - Unasyn 3 G IV q 6 H, susceptibilities pending.   - Follow urine culture and blood cultures   - Transplant nephrology and Transplant ID consulted to discuss measures that can be taken to prevent recurrent UTI's such as suprapubic catheter placement vs antibiotic ppx.   - Pt does not currently appear septic, but will broaden further to vancomycin and ertapenem if she decompensates.       # Toxic metabolic encephalopathy sec to UTI - Improving.   - She usually takes gabapentin and Trileptal for chronic pain.  Will hold these sedating medications until MS improves.  No complaints of pain currently.    - Neuro checks q 4 H      # H/o kidney transplant - Cr is normal today. D/c IVF.   - Continue PTA tacrolimus, sirolimus, and prednisone  - Transplant Nephrology consulted, appreciate recs  - Tacrolimus level in AM      #T2DM  - Holding glargine until tolerating po better  - Medium dose SSI with hypoglycemia protocol      #Hypertension   - Continue PTA clonidine 0.1 mg BID and labetalol 200 mg BID  - D/c IVF     # Hypothyroid  - Continue PTA synthroid 50 mcg qday    #HLD - continue home pravastatin and ASA 81 mg qday     FEN/GI - Diabetic diet, but pt not eating much.   - D/c IVF today.   - Zofran PRN   - famotidine 20 mg qday    Prophylaxis: DVT: SCDs.  CPAP per home settings.   Disposition: Pending improvement in clinical status.  Code Status: FULL      Patient seen and discussed with Dr. Milagros Green MD  Internal Medicine-Pediatrics, PGY4  [phone]        Physician Attestation  I, Prudencio Linares MD, saw this patient with the resident and agree with the resident s findings and plan of care as documented in the  resident s note with my edits.     I personally reviewed vital signs, medications, labs and imaging.    Prudencio Linares MD  Date of Service (when I saw the patient): 04/20/17

## 2017-04-21 LAB
ANION GAP SERPL CALCULATED.3IONS-SCNC: 12 MMOL/L (ref 3–14)
BKV DNA # SPEC NAA+PROBE: NORMAL COPIES/ML
BKV DNA SPEC NAA+PROBE-LOG#: NORMAL LOG COPIES/ML
BUN SERPL-MCNC: 25 MG/DL (ref 7–30)
CALCIUM SERPL-MCNC: 9.2 MG/DL (ref 8.5–10.1)
CHLORIDE SERPL-SCNC: 111 MMOL/L (ref 94–109)
CO2 SERPL-SCNC: 21 MMOL/L (ref 20–32)
CREAT SERPL-MCNC: 1.09 MG/DL (ref 0.52–1.04)
DEPRECATED CALCIDIOL+CALCIFEROL SERPL-MC: 28 UG/L (ref 20–75)
GFR SERPL CREATININE-BSD FRML MDRD: 50 ML/MIN/1.7M2
GLUCOSE BLDC GLUCOMTR-MCNC: 145 MG/DL (ref 70–99)
GLUCOSE BLDC GLUCOMTR-MCNC: 157 MG/DL (ref 70–99)
GLUCOSE BLDC GLUCOMTR-MCNC: 158 MG/DL (ref 70–99)
GLUCOSE SERPL-MCNC: 137 MG/DL (ref 70–99)
PHOSPHATE SERPL-MCNC: 2.8 MG/DL (ref 2.5–4.5)
POTASSIUM SERPL-SCNC: 3.7 MMOL/L (ref 3.4–5.3)
SODIUM SERPL-SCNC: 144 MMOL/L (ref 133–144)
SPECIMEN SOURCE: NORMAL

## 2017-04-21 PROCEDURE — 25000132 ZZH RX MED GY IP 250 OP 250 PS 637: Mod: GY | Performed by: INTERNAL MEDICINE

## 2017-04-21 PROCEDURE — 99233 SBSQ HOSP IP/OBS HIGH 50: CPT | Performed by: INTERNAL MEDICINE

## 2017-04-21 PROCEDURE — 84100 ASSAY OF PHOSPHORUS: CPT | Performed by: INTERNAL MEDICINE

## 2017-04-21 PROCEDURE — 25000131 ZZH RX MED GY IP 250 OP 636 PS 637: Mod: GY | Performed by: INTERNAL MEDICINE

## 2017-04-21 PROCEDURE — 12000001 ZZH R&B MED SURG/OB UMMC

## 2017-04-21 PROCEDURE — 80048 BASIC METABOLIC PNL TOTAL CA: CPT | Performed by: INTERNAL MEDICINE

## 2017-04-21 PROCEDURE — 40000275 ZZH STATISTIC RCP TIME EA 10 MIN

## 2017-04-21 PROCEDURE — 82306 VITAMIN D 25 HYDROXY: CPT | Performed by: INTERNAL MEDICINE

## 2017-04-21 PROCEDURE — 36415 COLL VENOUS BLD VENIPUNCTURE: CPT | Performed by: INTERNAL MEDICINE

## 2017-04-21 PROCEDURE — 00000146 ZZHCL STATISTIC GLUCOSE BY METER IP

## 2017-04-21 PROCEDURE — A9270 NON-COVERED ITEM OR SERVICE: HCPCS | Mod: GY | Performed by: INTERNAL MEDICINE

## 2017-04-21 PROCEDURE — 25000125 ZZHC RX 250: Performed by: INTERNAL MEDICINE

## 2017-04-21 PROCEDURE — 25000128 H RX IP 250 OP 636: Performed by: INTERNAL MEDICINE

## 2017-04-21 RX ORDER — AMPICILLIN 2 G/1
2 INJECTION, POWDER, FOR SOLUTION INTRAVENOUS EVERY 6 HOURS
Status: DISCONTINUED | OUTPATIENT
Start: 2017-04-21 | End: 2017-04-24

## 2017-04-21 RX ORDER — FUROSEMIDE 10 MG/ML
20 INJECTION INTRAMUSCULAR; INTRAVENOUS ONCE
Status: COMPLETED | OUTPATIENT
Start: 2017-04-21 | End: 2017-04-21

## 2017-04-21 RX ADMIN — LEVOTHYROXINE SODIUM 50 MCG: 50 TABLET ORAL at 08:01

## 2017-04-21 RX ADMIN — AMPICILLIN SODIUM 2 G: 2 INJECTION, POWDER, FOR SOLUTION INTRAMUSCULAR; INTRAVENOUS at 18:00

## 2017-04-21 RX ADMIN — LABETALOL HCL 200 MG: 200 TABLET, FILM COATED ORAL at 21:16

## 2017-04-21 RX ADMIN — ASPIRIN 81 MG: 81 TABLET, CHEWABLE ORAL at 08:01

## 2017-04-21 RX ADMIN — INSULIN ASPART 1 UNITS: 100 INJECTION, SOLUTION INTRAVENOUS; SUBCUTANEOUS at 08:00

## 2017-04-21 RX ADMIN — INSULIN GLARGINE 12 UNITS: 100 INJECTION, SOLUTION SUBCUTANEOUS at 22:58

## 2017-04-21 RX ADMIN — TACROLIMUS 2 MG: 1 CAPSULE ORAL at 08:01

## 2017-04-21 RX ADMIN — PRAVASTATIN SODIUM 10 MG: 10 TABLET ORAL at 08:01

## 2017-04-21 RX ADMIN — CLONIDINE HYDROCHLORIDE 0.1 MG: 0.1 TABLET ORAL at 08:01

## 2017-04-21 RX ADMIN — LABETALOL HCL 200 MG: 200 TABLET, FILM COATED ORAL at 08:01

## 2017-04-21 RX ADMIN — CLONIDINE HYDROCHLORIDE 0.1 MG: 0.1 TABLET ORAL at 21:16

## 2017-04-21 RX ADMIN — FAMOTIDINE 20 MG: 20 TABLET ORAL at 21:16

## 2017-04-21 RX ADMIN — TACROLIMUS 2 MG: 1 CAPSULE ORAL at 18:00

## 2017-04-21 RX ADMIN — FUROSEMIDE 20 MG: 10 INJECTION, SOLUTION INTRAVENOUS at 21:24

## 2017-04-21 RX ADMIN — SIROLIMUS 1.5 MG: 1 TABLET, SUGAR COATED ORAL at 08:01

## 2017-04-21 RX ADMIN — PREDNISONE 2.5 MG: 2.5 TABLET ORAL at 08:01

## 2017-04-21 RX ADMIN — FAMOTIDINE 20 MG: 20 TABLET ORAL at 08:01

## 2017-04-21 RX ADMIN — AMPICILLIN SODIUM AND SULBACTAM SODIUM 3 G: 2; 1 INJECTION, POWDER, FOR SOLUTION INTRAMUSCULAR; INTRAVENOUS at 05:46

## 2017-04-21 NOTE — CONSULTS
Nephrology Initial Consult  April 21, 2017      Gem Jade MRN:[id number] YOB: 1945  Date of Admission:4/18/2017  Primary care provider: Marivel Barcenas  Requesting physician: Prudencio Linares MD    ASSESSMENT AND RECOMMENDATIONS:  1. LDKT: resolving ROB, with Cr at her baseline, good Uout. Unlikely she has BK viremia, but agree to check BKVL   2. INS: her tacrolimus and sirolimus level were low, since her dose of Tacrolimus was not increased back after Fluconazole was D/C. She is back on Tacrolimus 2 mg BID now. Will check the level of Sirolimus and Tacrolimus on Mo. No chnages for now.  3. BP/ volume status: with dependent edema. Will give Lasix 20 mg once. BP elevated. Once euvolemic we can go up on her BP medication if need it. No need for changes for now.   4. UTI; Id is following her. Please assess if she will benefit from a suprapubic catheter.   5. Anemia: Hg 9.8 at goal.   6. Elevated Ca:not on Ca, or Vit D. Possible because she is bed bound.   7. AMS: improving. If delirium consider to win her off from Clonidine.   8. EBV viremia: Please check another EBV VL . (order in)     Recommendations were communicated to primary team during rounds.     Seen and discussed with Dr. Clara Malhotra MD   [phone]     Attestation:  This patient has been seen and evaluated by me, Ross Elizabeth MD.  I have reviewed the note and agree with plan of care as documented by the fellow.     REASON FOR CONSULT: ROB in transplant patient     HISTORY OF PRESENT ILLNESS:  Gem Jade is a 71 year old bed bound, morbidly obese F, with T2 DM, s/p LDKT in 10/1999, with baseline Cr of 0.8-1, on Sirolimus, Tacrolimus and Prednisone came with AMS and UTI with Klebsiella, and ROB.   She has been having recurrent UTIs, last time admitted between 3/8-3/15 2017, when she was treated for E fecalis and Candida. She has chronic rico cath. Her urinary cath was changed since last D/C 4 times.  ON admission her Cr was 1.5 elevated, now improving with AB and iv fluids.  UC is growing Enterococcus Fecalis, sensitive to Amoxicillin. She has been getting Unasyn. No fever, no chills, AM improving. ID is following. BP has been elevated, as she got iv fluids. She is on her home BP medication.     PAST MEDICAL HISTORY:  Reviewed with patient on 04/21/2017     Past Medical History:   Diagnosis Date     Background diabetic retinopathy(362.01)     extensive diabetic retinopathy, s/p multiple laser treatments     Diabetic gastroparesis (H)     followed by [facility] (Dr. 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)      Urinary tract infection, site not specified     Chronic UTIs     UTI (urinary tract infection) due to urinary indwelling catheter (H)        Past Surgical History:   Procedure Laterality Date     C NONSPECIFIC PROCEDURE  10/99    kidney transplant     C NONSPECIFIC PROCEDURE  8/00    MRI head, lumbar spine, pelvis     C TOTAL KNEE ARTHROPLASTY  3/03    Knee Replacement, Total right, post op PE     CHOLECYSTECTOMY       HC LEFT HEART CATHETERIZATION  1999    Cardiac Cath, Left Heart, Negative  (@ [facility])     HC LEFT HEART CATHETERIZATION  4/05    normal coronary angiogram at [facility], had mild troponin rise (0.71)     HC REMV CATARACT EXTRACAP,INSERT LENS  8/04    bilateral cataract repaired, left eye         MEDICATIONS:  PTA Meds  Prior to Admission medications    Medication Sig Last Dose Taking? Auth Provider   insulin aspart (NOVOLOG FLEXPEN)  100 UNIT/ML injection Inject 10 units before meals BID. At bedtime use following sliding scale:   150-200 5u  201-250 7u  251-300 9u  301-350 11u  351+ 13u  Yes    insulin glargine (LANTUS SOLOSTAR) 100 UNIT/ML injection Inject 24 units under the skin daily.  Yes    cyanocobalamin (VITAMIN B12) 1000 MCG/ML injection Inject 1 mL (1,000 mcg) into the muscle every 30 days  Yes Marivel Barcenas APRN CNP   predniSONE (DELTASONE) 2.5 MG tablet Take 1 tablet (2.5 mg) by mouth daily  Yes Ross Elizabeth MD   sirolimus (RAPAMUNE - GENERIC EQUIVALENT) 0.5 MG tablet Take 3 tablets (1.5 mg) by mouth daily  Yes Ross Elizabeth MD   gabapentin (NEURONTIN) 300 MG capsule Take 2 tabs 2x day for one week then increase to 3 tabs 2x daily  Yes Marivel Barcenas APRN CNP   meclizine (ANTIVERT) 12.5 MG tablet Take 1 tablet (12.5 mg) by mouth 3 times daily  Yes Alexi Richard MD   tacrolimus (PROGRAF - GENERIC EQUIVALENT) 1 MG capsule Take 1 capsule (1 mg) by mouth 2 times daily  Yes Alexi Richard MD   diphenoxylate-atropine (LOMOTIL) 2.5-0.025 MG per tablet Take 1 tablet by mouth 4 times daily as needed for diarrhea  Yes Marivel Barcenas APRN CNP   TraMADol HCl 50 MG TBDP Take 50 mg by mouth 3 times daily as needed  Patient taking differently: Take 50 mg by mouth 2 times daily   Yes Marivel Barcenas APRN CNP   blood glucose monitoring (ACCU-CHEK COMPACT PLUS) test strip Use to test blood sugar 3 times daily or as directed.  Yes Marivel Barcenas APRN CNP   levothyroxine (SYNTHROID) 50 MCG tablet Take 1 tablet (50 mcg) by mouth daily  Yes Marivel Barcenas APRN CNP   cloNIDine (CATAPRES) 0.1 MG tablet Take 1 tablet (0.1 mg) by mouth 2 times daily  Yes Marivel Barcenas APRN CNP   labetalol (NORMODYNE) 200 MG tablet Take 1 tablet (200 mg) by mouth 2 times daily  Yes Marivel Barcenas APRN CNP   glucagon (GLUCAGON EMERGENCY) 1 MG injection  "Inject 1 mg into the muscle once  Yes Barb Gordon APRN CNP   clotrimazole (LOTRIMIN) 1 % cream Apply topically 2 times daily Apply to buttocks twice a day until rash resolved.  Yes Barb Gordon APRN CNP   loratadine (CLARITIN) 10 MG tablet Take 1 tablet (10 mg) by mouth daily  Yes Marivel Barcenas APRN CNP   insulin pen needle 30G X 8 MM Use as directed with Lantus  Yes Marivel Barcenas APRN CNP   pravastatin (PRAVACHOL) 10 MG tablet Take 1 tablet (10 mg) by mouth daily  Yes Marivel Barcenas APRN CNP   ondansetron (ZOFRAN ODT) 4 MG disintegrating tablet Take 1-2 tablets (4-8 mg) by mouth every 8 hours as needed for nausea  Yes Marivel Barcenas APRN CNP   simethicone (MYLICON) 125 MG CHEW Take 1 tablet (125 mg) by mouth as needed for intestinal gas  Yes    Lactobacillus Rhamnosus, GG, (CULTURELL) capsule Take 1 capsule by mouth 2 times daily  Yes She Siddiqui MD   Nystatin (NYSTOP) 518507 UNIT/GM POWD Externally apply 1 g topically 3 times daily as needed  Yes Marivel Barcenas APRN CNP   alcohol swab prep pads Use to swab area of injection/yvon and vials as directed.  Yes Marivel Barcenas APRN CNP   Cranberry 400 MG TABS Take 400 mg by mouth 2 times daily  Yes Unknown, Entered By History   famotidine (PEPCID) 20 MG tablet Take 1 tablet (20 mg) by mouth 2 times daily  Yes Marivel Barcenas APRN CNP   ASPIRIN PO Take 81 mg by mouth daily.    Yes Unknown, Entered By History   ACETAMINOPHEN PO Take 325-650 mg by mouth every 4 hours as needed.  Yes Unknown, Entered By History   order for DME Equipment being ordered: Silver Impregnated catheters  HX of frequent UTI   Marivel Barcenas APRN CNP   OXcarbazepine (TRILEPTAL) 300 MG tablet Take 1 tablet (300 mg) by mouth 2 times daily   Marivel Barcenas APRN CNP   syringe/needle, disp, (B-D SYRINGE/NEEDLE) 22G X 1-1/2\" 3 ML MISC Inject 1 Syringe into the muscle every 30 days   " Lorenzo Choudhury MD   order for DME Equipment being ordered: Ahuja FX CPAP interface (nasal pillows), size XS.   Lorenzo Choudhury MD   order for DME Equipment being ordered: Blue Chux  Please send to [facility]   Marivel Barcenas APRN CNP   ORDER FOR DME Equipment being ordered: Johnson Catheters - 18 [place]   Marivel Barcenas APRN CNP   ACE NOT PRESCRIBED, INTENTIONAL, 1 each continuous prn ACE Inhibitor not prescribed due to Worsening renal function on ACE/ARB therapy   Marivel Barcenas APRN CNP      Current Meds    insulin glargine  12 Units Subcutaneous QAM AC     famotidine  20 mg Oral BID     tacrolimus  2 mg Oral BID IS     aspirin chewable tablet 81 mg  81 mg Oral Daily     cloNIDine  0.1 mg Oral BID     labetalol  200 mg Oral BID     levothyroxine  50 mcg Oral Daily     predniSONE  2.5 mg Oral Daily     pravastatin  10 mg Oral Daily     sirolimus  1.5 mg Oral Daily     insulin aspart  1-7 Units Subcutaneous TID AC     insulin aspart  1-5 Units Subcutaneous At Bedtime     ampicillin-sulbactam (UNASYN) IV  3 g Intravenous Q6H HO     Infusion Meds       ALLERGIES:    Allergies   Allergen Reactions     Bactrim Ds [Sulfamethoxazole W/Trimethoprim]      Creatinine level increased     Atorvastatin Calcium      myalgias, muscle aches     Codeine Nausea and Vomiting     Darvocet [Propoxyphene N-Apap] Nausea and Vomiting     Hydromorphone      Levofloxacin Other (See Comments) and Diarrhea     hallucinations     Morphine      Nausea and vomiting     Niaspan [Niacin] GI Disturbance     Flushing even at low dose, GI upset     Percocet [Oxycodone-Acetaminophen]      Vomiting after taking med couple of times     Vicodin [Hydrocodone-Acetaminophen] Nausea and Vomiting       REVIEW OF SYSTEMS:  A 10 point review of systems was negative except as noted above.    SOCIAL HISTORY:   Social History     Social History     Marital status:      Spouse name: N/A     Number of  "children: N/A     Years of education: N/A     Occupational History     Not on file.     Social History Main Topics     Smoking status: Former Smoker     Years: 17.00     Quit date: 1987     Smokeless tobacco: Not on file     Alcohol use No     Drug use: No     Sexual activity: Yes     Partners: Male     Other Topics Concern     Not on file     Social History Narrative     Reviewed with patient     FAMILY MEDICAL HISTORY:   Family History   Problem Relation Age of Onset     DIABETES Mother      DIABETES Brother      Hypertension Father      [id number] pneumonia     HEART DISEASE Father      Reviewed with patient     PHYSICAL EXAM:   Temp  Av.3  F (36.8  C)  Min: 96  F (35.6  C)  Max: 103  F (39.4  C)      Pulse  Av.8  Min: 57  Max: 119 Resp  Avg: 15.3  Min: 6  Max: 30  SpO2  Av.7 %  Min: 39 %  Max: 100 %  FiO2 (%)  Av.3 %  Min: 21 %  Max: 40 %       /78 (BP Location: Left arm)  Pulse 75  Temp 97.7  F (36.5  C) (Oral)  Resp 18  Ht 1.702 m (5' 7.01\")  Wt 93.8 kg (206 lb 12.7 oz)  SpO2 97%  BMI 32.38 kg/m2   Date 17 0700 - 17 0659   Shift 2545-1744 7762-3280 6648-2870 24 Hour Total   I  N  T  A  K  E   P.O. 180   180    Shift Total  (mL/kg) 180  (1.92)   180  (1.92)   O  U  T  P  U  T   Shift Total  (mL/kg)       Weight (kg) 93.8 93.8 93.8 93.8        Admit Weight: 93.4 kg (205 lb 12.8 oz)     GENERAL APPEARANCE: no acute  distress,  Awake  HENT: NC/AT, mouth without sores  Lymphatics: no cervical or supraclavicular LAD  Pulmonary: lungs clear to auscultation with equal breath sounds bilaterally, no clubbing  CV: regular rhythm, normal rate, no rub   - JVP no    - Edema dependent+1  GI: soft, nontender, normal bowel sounds, obese  : Johnson in, clear urine   SKIN: no rash, warm, dry, no cyanosis  NEURO: not at baseline, still confused  TX KIDNEY: normal    LABS:   CMP  Recent Labs  Lab 17  0645 17  0714 17  0005    144 138   POTASSIUM 3.7 3.8 4.1 "   CHLORIDE 111* 113* 104   CO2 21 22 27   ANIONGAP 12 8 7   * 150* 98   BUN 25 28 36*   CR 1.09* 1.01 1.53*   GFRESTIMATED 50* 54* 34*   GFRESTBLACK 60* 65 41*   ROGER 9.2 9.0 9.2   PHOS 2.8  --   --    PROTTOTAL  --   --  7.8   ALBUMIN  --   --  3.1*   BILITOTAL  --   --  0.2   ALKPHOS  --   --  101   AST  --   --  24   ALT  --   --  44     CBC  Recent Labs  Lab 04/20/17  0714 04/19/17  0005   HGB 9.8* 9.9*   WBC 4.0 5.2   RBC 3.42* 3.51*   HCT 31.7* 32.7*   MCV 93 93   MCH 28.7 28.2   MCHC 30.9* 30.3*   RDW 14.8 14.8   * 110*     INRNo lab results found in last 7 days.  ABGNo lab results found in last 7 days.   URINE STUDIES  Recent Labs   Lab Test  04/19/17   0019  03/08/17   1300  03/03/17   1800  02/16/17   2036   05/21/10   1553   COLOR  Yellow  Yellow  Straw  Yellow   < >  Yellow   APPEARANCE  Slightly Cloudy  Cloudy  Slightly Cloudy  Cloudy   < >  Clear   URINEGLC  Negative  Negative  50*  300*   < >  Negative   URINEBILI  Negative  Negative  Negative  Negative   < >  Negative   URINEKETONE  Negative  Negative  Negative  10*   < >  Negative   SG  1.010  1.015  1.006  >1.050*   < >  1.015   UBLD  Small*  Moderate*  Small*  Moderate*   < >  Moderate*   URINEPH  6.0  6.5  8.0*  6.5   < >  6.0   PROTEIN  100*  300*  100*  300*   < >  30*   UROBILINOGEN   --    --    --    --    --   0.2   NITRITE  Negative  Negative  Negative  Positive*   < >  Positive*   LEUKEST  Large*  Large*  Large*  Large*   < >  Moderate*   RBCU  12*  154*  46*  >182*   < >  *   WBCU  155*  >182*  43*  >182*   < >  *    < > = values in this interval not displayed.     Recent Labs   Lab Test  10/07/11   1230  09/27/11   1600   [facility]  Test canceled by PCU/Clinic  Charge credited PER RADHA FALCON  0.07     PTH  Recent Labs   Lab Test  03/11/17   0859  02/16/17   2303  10/07/11   1215  09/27/11   1600   PTHI  334*  9*  Test canceled by PCU/Clinic  Charge credited PER RADHA FALCON  53     IRON STUDIES  Recent Labs   Lab  Test  02/17/17   2358  09/04/16   0815  08/07/16   0750  08/09/12   0819  07/14/12   0720 05/08/12  10/07/11   1215  09/27/11   1600   IRON  50  124  12*  68  154  68  Test canceled by PCU/Clinic  Charge credited CANCELED PER RADHA FALCON CORRECTED ON 10/07 AT 1459: PREVIOUSLY REPORTED AS 69  26*   FEB  170*  128*  166*  188*  169*   --   Test canceled by PCU/Clinic  Charge credited CANCELED PER RADHA FALCON CORRECTED ON 10/07 AT 1459: PREVIOUSLY REPORTED   227*   IRONSAT  29  97*  7*  36  91*   --   Test canceled by PCU/Clinic  Charge credited CANCELED PER RADHA FALCON CORRECTED ON 10/07 AT 1459: PREVIOUSLY REPORTED AS 30  12*   GIANA  1069*  2676*  645*   --   444*  630?   --   72       IMAGING:  All imaging studies reviewed by me.     Rochelle Malhotra MD

## 2017-04-21 NOTE — PLAN OF CARE
Problem: Goal Outcome Summary  Goal: Goal Outcome Summary  Outcome: Declining  VSS. Alert and oriented x4 but became withdrawn, hostile, and uncooperative later in the shift. Had BG taken in the morning (145) and received morning meds, but refused vitals/cares/meds/BG in the afternoon.  was updated on her condition x2. MDs updated as well. Pt stated that she is frustrated and upset that her  has not come to visit her today. Mild edema present, especially in her feet. Johnson patent; 975mL output.      Plan: Continue to monitor. Encourage to keep with the medication/care regimen.

## 2017-04-21 NOTE — PROGRESS NOTES
Pt refuses to turn onto R side, stating that it causes increased pain. Pt was repositioned between L side and back when pt allowed during night. Will continue to monitor.

## 2017-04-21 NOTE — PROGRESS NOTES
Care Coordinator- Discharge Planning     Admission Date/Time:  4/18/2017  Attending MD:  Prudencio Linares MD     Data  Date of initial CC assessment:  4/18/17  Chart reviewed, discussed with interdisciplinary team.   Patient was admitted for:   1. Recurrent UTI    2. Altered mental status, unspecified altered mental status type    3. Urinary tract infection, site unspecified    4. Kidney replaced by transplant    5. Urinary tract infection, site not specified         Assessment  POC discussed with team this morning.  Anticipate d/c tomorrow (4/22/17).  Per previuos RNCC note, pt will need stretcher ride.  Ride arranged for 2pm via [employer] ([phone]).  Ride will need to be canceled if no d/c. Pt currently on IV antibiotics, plan to change to oral for d/c.  Resumption orders for home care already in place.          Plan  Anticipated Discharge Date:  4/22/17  Anticipated Discharge Plan:  Home     CTS Handoff completed:  YES    [place] RN CC  Float Care Coordinator covering for Gold 2  Pager [phone]

## 2017-04-21 NOTE — PLAN OF CARE
"Problem: Goal Outcome Summary  Goal: Goal Outcome Summary  Outcome: No Change  Writer cared for pt from [id number]. See previous note. VSS on RA (temp 99.3).  came to bedside, and MD came to discuss POC with pt and . Pt refused evening BG again with much encouragement from staff and . When writer came with evening meds, pt was refusing again. Writer sat and talked with pt for ~20min. Pt initially did not want to talk, but did open up. Pt very depressed. Pt talked about how close her family is (or use to be). Pt shared stories of nieces and would smile and laugh. At one pt, pt seemed delusional stating \"they are out there now\" (in the redding). Pt also stated that she didn't want to go back home. Writer offered spiritual support (gamaliel visit), but pt declined stating \"my  and I don't have the same [employer]ism.\" Pt took meds, including IV abx. Pt educated on POC (diaresis and abx's) and importance of following POC. Pt has been refusing repositioning stating \"my back already hurts, it's too late now, it wont make a difference.\" Still no appetite/intake.      Plan: Continue to monitor and support POC. Encourage compliance. Offer emotional support. Update MD's with concerns.       "

## 2017-04-21 NOTE — PROGRESS NOTES
[facility]  Transplant Infectious Disease Progress Note     Patient:  Gem Jade, Date of birth 1945, Medical record number [id number]  Date of Visit:  04/21/2017         Assessment and Recommendations:   Recommendations:  - agree with ampicillin 2gm IV q6hrs and transition to oral amoxicillin to treat complicated UTI  - could consider supra-pubic catheter however there is not strong data to support decreased risk of UTIs in patient's with neurogenic bladder compared to transurethral catheterization  - if antibiotic ppx is desired, we would recommend amoxicillin 250mg PO BID for one month and then decreasing to 250mg daily  - follow up serum PCR for CMV, EBV, and BK virus     Thank you very much for this consultation. Transplant Infectious Disease will continue to follow with you.     Assessment:  Ms. Jade is a 71 year old female with PMH of DM2 with neuropathy, retinopathy and nephropathy s/p kidney transplant in 1999 on sirolimus, tacrolimus and prednisone, chronic indwelling catheter with recurrent UTIs (4x/year), and bed bound due to polyneuropathy and vertigo admitted on 4/18 with altered mental status found to have concern for UTI.      Infectious Disease issues include:  - Complicated UTI - Patient with altered mental status and no other focal signs of infection. Has neurogenic bladder with poor sensation, so has not noticed any urinary symptoms. Johnson replaced on 4/20/2017. UA with 155 WBC and large LE (no nitrities). Urine culture pending with 50-100K of 2 strains of Enterococcus species. Hx of recurrent UTIs with previous ESBL-producing bacteria (12/2015) and more recent susceptible isolates. Regarding ppx, would minimize antibiotics if possible given history of C diff infection, however amoxicillin may be a reasonable choice given patient's recent susceptibilities.  - Altered Mental Status - patient with altered mental status at baseline per review of recent PCP and  pharmacist notes, however acute change reported per  on admission. Currently is oriented and verbose without focal signs concerning for infection or acute CNS event. Likely worsened due to infection in addition to baseline confusion.  - QTc interval: 445msec (4/18/2017)  - Bacterial prophylaxis: None  - Pneumocystis prophylaxis: None  - Viral serostatus & prophylaxis: CMV+, EBV+, no ppx currently  - Fungal prophylaxis: None  - Immunization status: Up to date  - Gamma globulin status: Unknown  - Isolation status: Good hand hygiene. Contact for history of ESBL-producing E coli (urine 12/2015).     Transplant ID will continue to follow. Patient seen and discussed with Dr. Johanny Boland, ID staff attending. Recommendations discussed with Dr. Green.     Khloe Nolasco MD, pgy6   Fellow in Pediatric and Adult Infectious Disease  pager: [phone]          Interval History:   Afebrile with stable vital signs overnight. Hypertensive. Weaned off supplemental oxygen.       Transplants:  10/27/1999 (Kidney), Postoperative day:  6386.  Coordinator Jorgito Jacinto    Review of Systems:  CONSTITUTIONAL:  No fevers or chills  EYES: negative for icterus or acute vision changes  ENT:  negative for acute hearing loss, tinnitus, sore throat  RESPIRATORY:  negative for cough, sputum or dyspnea  CARDIOVASCULAR:  negative for chest pain, palpitations  GASTROINTESTINAL:  negative for nausea, vomiting, diarrhea or constipation  GENITOURINARY:  negative for dysuria or hematuria  HEME:  No easy bruising or bleeding  INTEGUMENT:  negative for rash or pruritus  NEURO:  Negative for headache or tremor         Current Medications & Allergies:       insulin glargine  12 Units Subcutaneous QAM AC     furosemide  20 mg Intravenous Once     ampicillin  2 g Intravenous Q6H     famotidine  20 mg Oral BID     tacrolimus  2 mg Oral BID IS     aspirin chewable tablet 81 mg  81 mg Oral Daily     cloNIDine  0.1 mg Oral BID     labetalol   200 mg Oral BID     levothyroxine  50 mcg Oral Daily     predniSONE  2.5 mg Oral Daily     pravastatin  10 mg Oral Daily     sirolimus  1.5 mg Oral Daily     insulin aspart  1-7 Units Subcutaneous TID AC     insulin aspart  1-5 Units Subcutaneous At Bedtime       Infusions/Drips: None       Allergies   Allergen Reactions     Bactrim Ds [Sulfamethoxazole W/Trimethoprim]      Creatinine level increased     Atorvastatin Calcium      myalgias, muscle aches     Codeine Nausea and Vomiting     Darvocet [Propoxyphene N-Apap] Nausea and Vomiting     Hydromorphone      Levofloxacin Other (See Comments) and Diarrhea     hallucinations     Morphine      Nausea and vomiting     Niaspan [Niacin] GI Disturbance     Flushing even at low dose, GI upset     Percocet [Oxycodone-Acetaminophen]      Vomiting after taking med couple of times     Vicodin [Hydrocodone-Acetaminophen] Nausea and Vomiting            Physical Exam:   Vitals were reviewed.  All vitals stable.  Patient Vitals for the past 24 hrs:   BP Temp Temp src Pulse Resp SpO2 Weight   04/21/17 0547 198/78 97.7  F (36.5  C) Oral - 18 97 % -   04/21/17 0229 161/61 97.5  F (36.4  C) Oral - 16 98 % -   04/21/17 0003 190/83 - - - - - -   04/20/17 2206 173/63 98.1  F (36.7  C) Oral 75 16 98 % -   04/20/17 1926 195/75 96.4  F (35.8  C) Oral 76 16 96 % 206 lb 12.7 oz (93.8 kg)   04/20/17 1448 143/71 98.3  F (36.8  C) Oral 73 16 97 % -     Ranges for vital signs:  Temp:  [96.4  F (35.8  C)-98.3  F (36.8  C)] 97.7  F (36.5  C)  Pulse:  [73-76] 75  Heart Rate:  [69-76] 76  Resp:  [16-18] 18  BP: (143-198)/(61-83) 198/78  SpO2:  [96 %-98 %] 97 %  Vitals:    04/19/17 0715 04/20/17 1926   Weight: 205 lb 12.8 oz (93.4 kg) 206 lb 12.7 oz (93.8 kg)       Physical Examination:   GENERAL: well-developed, well-nourished, in bed in no acute distress.  HEAD: Head is normocephalic, atraumatic   EYES: Eyes have anicteric sclerae without conjunctival injection   ENT: Oropharynx is moist without  exudates or ulcers. Tongue is midline.  NECK: Supple. No cervical lymphadenopathy.  LUNGS: Clear to auscultation bilaterally.  CARDIOVASCULAR: Regular rate and rhythm with no murmurs, gallops or rubs.  ABDOMEN: Normal bowel sounds, soft, nontender. No appreciable hepatosplenomegaly.  SKIN: No acute rashes. Well healed scars of bilateral knees. Line in place without any surrounding erythema or exudate.  NEUROLOGIC: Alert and oriented to place, month, year, and self. Tangential speech and paranoia. Grossly nonfocal. Active x4 extremities         Laboratory Data:     Absolute CD4   Date Value Ref Range Status   02/17/2017 379 (L) 441 - 2156 cells/uL Final       Inflammatory Markers  Recent Labs   Lab Test  03/08/17   1243  08/31/16   0801  05/25/16   2116  02/18/16   1436  04/28/15   1650  12/29/14   1330   SED   --    --   53*   --    --    --    CRP  7.5  39.0*  <2.9  8.8*  145.0*  114.0*       Metabolic Studies     Recent Labs   Lab Test  04/21/17   0645  04/20/17   0714  04/19/17   0005   03/09/17   1310   03/08/17   1243   02/16/17   2303   12/05/16   1210   08/31/16   0801   08/29/16   2157   08/07/12   0647   NA  144  144  138   < >   --    < >  135   < >  131*   < >   --    < >  137   < >  133   < >  135   POTASSIUM  3.7  3.8  4.1   < >   --    < >  4.6   < >   --    < >   --    < >  3.8   < >  4.5   < >  5.9*   CHLORIDE  111*  113*  104   < >   --    < >  98   < >   --    < >   --    < >  106   < >  94   < >  104   CO2  21  22  27   < >   --    < >  28   < >   --    < >   --    < >  23   < >  32   < >  22   ANIONGAP  12  8  7   < >   --    < >  8   < >   --    < >   --    < >  8   < >  7   < >  8   BUN  25  28  36*   < >   --    < >  42*   < >   --    < >   --    < >  29   < >  47*   < >  21   CR  1.09*  1.01  1.53*   < >   --    < >  3.37*   < >   --    < >   --    < >  0.98   < >  1.70*   < >  1.49*   GFRESTIMATED  50*  54*  34*   < >   --    < >  13*   < >   --    < >   --    < >  56*   < >  30*   < >  35*    GLC  137*  150*  98   < >   --    < >  61*   < >   --    < >   --    < >  146*   < >  199*   < >  79   A1C   --    --    --    --    --    --    --    --    --    --   7.4*   --    --    --    --    < >   --    ROGER  9.2  9.0  9.2   < >   --    < >  10.2*   < >   --    < >   --    < >  9.2   < >  10.6*   < >  8.6   PHOS  2.8   --    --    --    --    < >   --    < >  2.9   --    --    --    --    --    --    < >   --    MAG   --    --    --    --    --    --    --    --   2.0   --    --    < >   --    --    --    < >   --    LACT   --    --   1.0   --    --    --   0.6*   --    --    < >   --    --    --    --    --    < >   --    PCAL   --    --    --    --    --    --    --    --    --    --    --    --   0.09   < >   --    < >   --    FGTL   --    --    --    --    --    --    --    --    --    --    --    --    --    --   459   --    --    CKT   --    --    --    --   36   --    --    --    --    --    --    --    --    --    --    --   <20*    < > = values in this interval not displayed.       Hepatic Studies  Recent Labs   Lab Test  04/19/17   0005  03/22/17   1130  03/09/17   0750  03/08/17   1243   09/03/16   1329  08/30/16   1709   09/03/13   0725   BILITOTAL  0.2  0.1*   --   0.3   < >   --   0.5   < >  0.3   BILIDELTA   --    --    --    --    --    --    --    --   0.3   BILICONJ   --    --    --    --    --    --    --    --   0.0   DBIL   --   <0.1   --    --    --    --    --    < >   --    ALKPHOS  101  77   --   87   < >   --   69   < >  77   PROTTOTAL  7.8  7.6   --   7.9   < >   --   7.1   < >  5.9*   ALBUMIN  3.1*  3.0*  2.7*  3.0*   < >   --   3.0*   < >  2.6*   AST  24  25   --   13   < >   --   8   < >  17   ALT  44  29   --   <6   < >   --   8   < >  27   LDH   --    --    --    --    --   150  157   --    --     < > = values in this interval not displayed.       Pancreatitis testing  Recent Labs   Lab Test  04/19/17   0005  12/05/16   1210   09/02/13   0053  06/29/13   1711   LIPASE  190    --    --   92  125   TRIG   --   272*   < >   --    --     < > = values in this interval not displayed.       Gout Labs      Recent Labs   Lab Test  02/17/17   2358   URIC  7.5*       Hematology Studies    Recent Labs   Lab Test  04/20/17   0714  04/19/17   0005  04/03/17   1200  03/22/17   1130  03/15/17   0731  03/14/17   0701   WBC  4.0  5.2  4.3  4.7  4.6  5.1   ANEU   --   3.7   --   2.9   --    --    ALYM   --   1.0   --   1.3   --    --    CARROLL   --   0.3   --   0.4   --    --    AEOS   --   0.2   --   0.2   --    --    HGB  9.8*  9.9*  9.3*  9.3*  8.4*  8.7*   HCT  31.7*  32.7*  29.8*  29.8*  27.3*  28.3*   PLT  104*  110*  143*  124*  115*  103*       Clotting Studies    Recent Labs   Lab Test  03/08/17   1243  09/02/16   0838  09/01/16   1800  08/31/16   0801   08/07/16   0750   INR  1.01  1.09  1.13  0.98   < >  1.12   PTT   --    --    --    --    --   38*    < > = values in this interval not displayed.       Iron Testing  Recent Labs   Lab Test  04/20/17   0714   03/09/17   1310   02/17/17   2358   09/04/16   0815  09/03/16   1329   08/29/16   2316   08/07/16   0750   IRON   --    --    --    --   50   --   124   --    --    --    --   12*   FEB   --    --    --    --   170*   --   128*   --    --    --    --   166*   IRONSAT   --    --    --    --   29   --   97*   --    --    --    --   7*   GIANA   --    --    --    --   1069*   --   2676*   --    --    --    --   645*   MCV  93   < >   --    < >   --    < >  94   --    < >   --    < >  106*   FOLIC   --    --   5.2*   --   8.5   --    --    --    < >   --    --   10.4   B12   --    --   1402*   --   1031*   --    --    --    < >   --    --   131*   HAPT   --    --    --    --    --    --    --   94   < >   --    --    --    RETP   --    --    --    --    --    --    --    --    --   0.3*   --    --    RETICABSCT   --    --    --    --    --    --    --    --    --   7.4*   --    --     < > = values in this interval not displayed.     Arterial Blood  Gas Testing  Recent Labs   Lab Test  01/10/16   1455  03/18/14   1120  03/18/14   0115  05/19/13   0705 07/13/12   PH  7.40  7.42  7.42  7.36   --    PCO2  49*  40  43  50*   --    PO2  323*  176*  245*  126*   --    HCO3  30*  26  28  29*  <10   O2PER  58   --   15LPR  3LPM   --         Thyroid Studies     Recent Labs   Lab Test  03/09/17   1310  08/30/16   0032  05/26/16   0744  03/18/14   1138  06/29/13   2153   TSH  1.20  1.01  0.96  0.68  1.30   T4  0.72*   --   1.00   --    --        Urine Studies   Recent Labs   Lab Test  04/19/17   0019  03/08/17   1300  03/03/17   1800  02/16/17   2036  02/13/17   1845   URINEPH  6.0  6.5  8.0*  6.5  6.0   NITRITE  Negative  Negative  Negative  Positive*  Positive*   LEUKEST  Large*  Large*  Large*  Large*  Large*   WBCU  155*  >182*  43*  >182*  >182*       CSF testing   Recent Labs   Lab Test  09/02/16   0940   CWBC  0  Duplicate request  Test not indicated.     CRBC  2  Duplicate request  Test not indicated.     CGLU  89*   CTP  44       Medication levels  Recent Labs   Lab Test  04/20/17   0714   08/29/13   1310  07/16/13   1220   VANCOMYCIN   --    --   <5.0 Traditional Dosing therapeutic Range:         Trough 8-20 mg/L         Peak 20-50 mg/L   --    TACROL  3.1*   < >   --   3.6*   RAPAMY  3.3*   < >   --    --    MPACID   --    --    --   3.13   MPAG   --    --    --   98.3*    < > = values in this interval not displayed.       Microbiology:  Beta D Glucan levels (Fungitell assay)    Recent Labs   Lab Test  08/29/16   2157   FGTL  459       Last Culture results with specimen source  Culture Micro   Date Value Ref Range Status   04/19/2017 No growth after 2 days  Final   04/19/2017 (A)  Final    50,000 to 100,000 colonies/mL Enterococcus faecalis  50,000 to 100,000 colonies/mL Strain 2 Enterococcus faecalis     04/19/2017 No growth after 2 days  Final   03/08/2017 No growth  Final   03/08/2017 (A)  Final    >100,000 colonies/mL Enterococcus faecalis  >100,000  colonies/mL Candida albicans / dubliniensis Candida albicans and Candida   dubliniensis are not routinely speciated Susceptibility testing not routinely   done     03/08/2017 No growth  Final   03/03/2017 (A)  Final    50,000 to 100,000 colonies/mL Candida albicans / dubliniensis Candida albicans   and Candida dubliniensis are not routinely speciated Susceptibility testing not   routinely done     02/21/2017   Final    Canceled, Test credited Should be ordered as a future order   02/16/2017 No growth  Final   02/16/2017 (A)  Final    10,000 to 50,000 colonies/mL Escherichia coli  >100,000 colonies/mL Candida albicans / dubliniensis Candida albicans and Candida   dubliniensis are not routinely speciated      Specimen Description   Date Value Ref Range Status   04/19/2017 Blood Left Arm  Final   04/19/2017 Unspecified Urine  Final   04/19/2017 Blood Right Arm  Final   03/08/2017 Blood Left Arm  Final   03/08/2017 Catheterized Urine  Final   03/08/2017 Blood Right Arm  Final   03/07/2017 Feces  Final   03/03/2017 Unspecified Urine  Final   02/21/2017 Unknown  Final   02/16/2017 Blood Unspecified Site VAD Collection  Final        Last check of C difficile  C Diff Toxin B PCR   Date Value Ref Range Status   03/07/2017  NEG Final    Negative  Negative: Clostridium difficile target DNA sequences NOT detected, presumed   negative for Clostridium difficile toxin B or the number of bacteria present   may be below the limit of detection for the test.   FDA approved assay performed using [employer] GeneXpert real-time PCR.   A negative result does not exclude actual disease due to Clostridium difficile   and may be due to improper collection, handling and storage of the specimen or   the number of organisms in the specimen is below the detection limit of the   assay.         Virology:  Log IU/mL of CMVQNT   Date Value Ref Range Status   04/20/2017 Pending <2.1 [Log_IU]/mL Incomplete   03/09/2017 Not Calculated <2.1 [Log_IU]/mL  Final   09/02/2016 <2.1 <2.1 [Log_IU]/mL Final   08/31/2016 Not Calculated <2.1 [Log_IU]/mL Final   05/28/2016 Not Calculated <2.1 [Log_IU]/mL Final     EB Virus DNA Quant Copy/mL   Date Value Ref Range Status   09/02/2016 <390  Unit: cpy/mL    Final     EBV DNA Copies/mL   Date Value Ref Range Status   03/10/2017 15502 (A) EBVNEG [Copies]/mL Final   02/17/2017 605900 (A) EBVNEG [Copies]/mL Final   08/30/2016 2393 (A) EBVNEG [Copies]/mL Final   05/28/2016 9498 (A) EBVNEG [Copies]/mL Final       BK viral loads   Recent Labs   Lab Test  03/09/17   1310  02/17/17   2358  10/12/12   0713  11/07/10   1250   BKSPEC  Plasma, EDTA anticoagulant  CORRECTED ON 03/09 AT 1319: PREVIOUSLY REPORTED AS Plasma    Plasma  Plasma, EDTA anticoagulant  Plasma, EDTA anticoagulant   BKRES  BK Virus DNA Not Detected  BK Virus DNA Not Detected  <1000  <1000       Parvovirus Testing  Recent Labs   Lab Test  09/02/16   0838   PRVG  0.28   PRVM  0.11   PRVSP  EDTA PLASMA   PRVPC  Not Detected  (Note)  NOT DETECTED - A negative result does not rule out the  presence of PCR inhibitors in the patient specimen or assay  specific nucleic acid in concentrations below the level of  detection by the assay.  INTERPRETIVE INFORMATION: Parvovirus B19 By PCR  Test developed and characteristics determined by [facility]. See Compliance Statement B: [email or web address]  Performed by [facility],  [address] [place] [address] [phone]  [email or web address], Fco Taveras MD, Lab. Director         Imaging:  Recent Results (from the past 48 hour(s))   US Renal Transplant    Narrative    EXAMINATION: US RENAL TRANSPLANT, 4/20/2017 9:29 PM     COMPARISON: Ultrasound 3/8/2017, 2/16/2017.    HISTORY: Status post renal transplant with UTI and altered mental  status. Renal transplant in 1999.    TECHNIQUE:  Grey-scale, color Doppler and spectral flow analysis.    FINDINGS:  The transplant kidney is located right lower quadrant, and measures  11.7 cm  length. Parenchyma is of normal thickness and echogenicity. No  focal lesions. Mildly prominent renal pelvis, without expanded  calyces. No perinephric fluid collection.      Renal Vein Flow:  29 cm/sec at hilum.   58 cm/sec at anastomosis.    Upper Pole Arcuate artery:  0.75 resistive index, previously 0.3.    Mid pole Arcuate artery:  0.77 resistive index, previously 0.6.     Lower Pole Arcuate artery:  0.73 resistive index, previously 0.84.     Renal artery flow:   167 cm/sec peak systolic at hilum. Resistive index: 0.87, previously  0.93  93 cm/sec peak systolic at anastomosis. Resistive index: 0.87,  previously 0.94.    Iliac artery flow:  129 cm/sec peak systolic above anastomosis.  103 cm/sec peak systolic below anastomosis.    Iliac vein flow:  59 cm/sec peak systolic above anastomosis.  23 cm/sec peak systolic below anastomosis.      Impression    IMPRESSION:  1. Patent Doppler examination of the right lower quadrant renal  transplant. Resistive indices in arcuate arteries have improved  compared to prior, now borderline normal. Elevated resistive indices  in the transplant renal artery have also improved compared to prior,  but remain elevated.   2. Mildly prominent renal pelvis, without overt hydronephrosis.  Finding is similar to prior.    I have personally reviewed the examination and initial interpretation  and I agree with the findings.    MATILDE SABILLON MD

## 2017-04-21 NOTE — PLAN OF CARE
Problem: Goal Outcome Summary  Goal: Goal Outcome Summary  Outcome: Improving  Altered mental Status. Pt is A&O to self and situation. Chronic rico for neurogenic bladder,with fair output. Pt refused medications and BG checks when  was not here, however has been compliant since he spoke with her. Diabetic, HS . IV abx infused w/o complication. Bed alarm active and audible.       Continue to monitor and follow the POC.

## 2017-04-21 NOTE — PLAN OF CARE
Assumed cares at 2300. A/o x 2, some bouts of confusion. VSS- Slightly elevated BP- MD notified. A2 with all cares. Q2H turn, however refuses to turn onto R side d/t increased pain per pt. .R PIV SL. L PIV TKO. IV abx given per order. Bed alarm for pt safety. Alex patent. CPAP at night, however will not wear hospital CPAP and would like  to bring in one from home. P: Continue to monitor mental status. Call light within reach. Will continue to monitor.

## 2017-04-22 LAB
ALBUMIN SERPL-MCNC: 3.1 G/DL (ref 3.4–5)
ALP SERPL-CCNC: 100 U/L (ref 40–150)
ALT SERPL W P-5'-P-CCNC: 42 U/L (ref 0–50)
ANION GAP SERPL CALCULATED.3IONS-SCNC: 10 MMOL/L (ref 3–14)
AST SERPL W P-5'-P-CCNC: 24 U/L (ref 0–45)
BILIRUB DIRECT SERPL-MCNC: 0.1 MG/DL (ref 0–0.2)
BILIRUB SERPL-MCNC: 0.7 MG/DL (ref 0.2–1.3)
BUN SERPL-MCNC: 25 MG/DL (ref 7–30)
CALCIUM SERPL-MCNC: 9.4 MG/DL (ref 8.5–10.1)
CHLORIDE SERPL-SCNC: 103 MMOL/L (ref 94–109)
CMV DNA SPEC NAA+PROBE-ACNC: NORMAL [IU]/ML
CMV DNA SPEC NAA+PROBE-LOG#: NORMAL {LOG_IU}/ML
CO2 SERPL-SCNC: 22 MMOL/L (ref 20–32)
CREAT SERPL-MCNC: 1.16 MG/DL (ref 0.52–1.04)
ERYTHROCYTE [DISTWIDTH] IN BLOOD BY AUTOMATED COUNT: 14.5 % (ref 10–15)
GFR SERPL CREATININE-BSD FRML MDRD: 46 ML/MIN/1.7M2
GLUCOSE BLDC GLUCOMTR-MCNC: 140 MG/DL (ref 70–99)
GLUCOSE BLDC GLUCOMTR-MCNC: 179 MG/DL (ref 70–99)
GLUCOSE BLDC GLUCOMTR-MCNC: 212 MG/DL (ref 70–99)
GLUCOSE BLDC GLUCOMTR-MCNC: 215 MG/DL (ref 70–99)
GLUCOSE BLDC GLUCOMTR-MCNC: 275 MG/DL (ref 70–99)
GLUCOSE SERPL-MCNC: 222 MG/DL (ref 70–99)
HCT VFR BLD AUTO: 33.3 % (ref 35–47)
HGB BLD-MCNC: 10.5 G/DL (ref 11.7–15.7)
MAGNESIUM SERPL-MCNC: 1.7 MG/DL (ref 1.6–2.3)
MCH RBC QN AUTO: 28.5 PG (ref 26.5–33)
MCHC RBC AUTO-ENTMCNC: 31.5 G/DL (ref 31.5–36.5)
MCV RBC AUTO: 91 FL (ref 78–100)
PHOSPHATE SERPL-MCNC: 2.8 MG/DL (ref 2.5–4.5)
PLATELET # BLD AUTO: 90 10E9/L (ref 150–450)
POTASSIUM SERPL-SCNC: 3.5 MMOL/L (ref 3.4–5.3)
PROT SERPL-MCNC: 7.9 G/DL (ref 6.8–8.8)
RBC # BLD AUTO: 3.68 10E12/L (ref 3.8–5.2)
SODIUM SERPL-SCNC: 136 MMOL/L (ref 133–144)
SPECIMEN SOURCE: NORMAL
WBC # BLD AUTO: 5 10E9/L (ref 4–11)

## 2017-04-22 PROCEDURE — 25000131 ZZH RX MED GY IP 250 OP 636 PS 637: Mod: GY | Performed by: INTERNAL MEDICINE

## 2017-04-22 PROCEDURE — A9270 NON-COVERED ITEM OR SERVICE: HCPCS | Mod: GY | Performed by: INTERNAL MEDICINE

## 2017-04-22 PROCEDURE — 25000132 ZZH RX MED GY IP 250 OP 250 PS 637: Mod: GY | Performed by: INTERNAL MEDICINE

## 2017-04-22 PROCEDURE — 85027 COMPLETE CBC AUTOMATED: CPT | Performed by: INTERNAL MEDICINE

## 2017-04-22 PROCEDURE — 25000128 H RX IP 250 OP 636: Performed by: INTERNAL MEDICINE

## 2017-04-22 PROCEDURE — 36415 COLL VENOUS BLD VENIPUNCTURE: CPT | Performed by: INTERNAL MEDICINE

## 2017-04-22 PROCEDURE — 99233 SBSQ HOSP IP/OBS HIGH 50: CPT | Mod: GC | Performed by: INTERNAL MEDICINE

## 2017-04-22 PROCEDURE — 83735 ASSAY OF MAGNESIUM: CPT | Performed by: INTERNAL MEDICINE

## 2017-04-22 PROCEDURE — 80048 BASIC METABOLIC PNL TOTAL CA: CPT | Performed by: INTERNAL MEDICINE

## 2017-04-22 PROCEDURE — 25000125 ZZHC RX 250: Performed by: INTERNAL MEDICINE

## 2017-04-22 PROCEDURE — 12000001 ZZH R&B MED SURG/OB UMMC

## 2017-04-22 PROCEDURE — 80076 HEPATIC FUNCTION PANEL: CPT | Performed by: INTERNAL MEDICINE

## 2017-04-22 PROCEDURE — 84100 ASSAY OF PHOSPHORUS: CPT | Performed by: INTERNAL MEDICINE

## 2017-04-22 PROCEDURE — 00000146 ZZHCL STATISTIC GLUCOSE BY METER IP

## 2017-04-22 RX ORDER — CINACALCET 30 MG/1
30 TABLET, FILM COATED ORAL DAILY
Status: DISCONTINUED | OUTPATIENT
Start: 2017-04-22 | End: 2017-04-28 | Stop reason: HOSPADM

## 2017-04-22 RX ORDER — POLYETHYLENE GLYCOL 3350 17 G/17G
17 POWDER, FOR SOLUTION ORAL DAILY PRN
Status: DISCONTINUED | OUTPATIENT
Start: 2017-04-22 | End: 2017-04-28 | Stop reason: HOSPADM

## 2017-04-22 RX ORDER — AMLODIPINE BESYLATE 5 MG/1
5 TABLET ORAL DAILY
Status: DISCONTINUED | OUTPATIENT
Start: 2017-04-22 | End: 2017-04-25

## 2017-04-22 RX ORDER — FUROSEMIDE 10 MG/ML
20 INJECTION INTRAMUSCULAR; INTRAVENOUS ONCE
Status: COMPLETED | OUTPATIENT
Start: 2017-04-22 | End: 2017-04-22

## 2017-04-22 RX ORDER — SENNOSIDES 8.6 MG
8.6 TABLET ORAL 2 TIMES DAILY PRN
Status: DISCONTINUED | OUTPATIENT
Start: 2017-04-22 | End: 2017-04-28 | Stop reason: HOSPADM

## 2017-04-22 RX ADMIN — TACROLIMUS 2 MG: 1 CAPSULE ORAL at 17:37

## 2017-04-22 RX ADMIN — AMPICILLIN SODIUM 2 G: 2 INJECTION, POWDER, FOR SOLUTION INTRAMUSCULAR; INTRAVENOUS at 12:26

## 2017-04-22 RX ADMIN — INSULIN ASPART 2 UNITS: 100 INJECTION, SOLUTION INTRAVENOUS; SUBCUTANEOUS at 07:54

## 2017-04-22 RX ADMIN — ASPIRIN 81 MG: 81 TABLET, CHEWABLE ORAL at 07:55

## 2017-04-22 RX ADMIN — FUROSEMIDE 20 MG: 10 INJECTION, SOLUTION INTRAVENOUS at 12:26

## 2017-04-22 RX ADMIN — AMPICILLIN SODIUM 2 G: 2 INJECTION, POWDER, FOR SOLUTION INTRAMUSCULAR; INTRAVENOUS at 17:43

## 2017-04-22 RX ADMIN — AMLODIPINE BESYLATE 5 MG: 5 TABLET ORAL at 12:26

## 2017-04-22 RX ADMIN — FAMOTIDINE 20 MG: 20 TABLET ORAL at 20:36

## 2017-04-22 RX ADMIN — SIROLIMUS 1.5 MG: 1 TABLET, SUGAR COATED ORAL at 07:54

## 2017-04-22 RX ADMIN — PREDNISONE 2.5 MG: 2.5 TABLET ORAL at 07:55

## 2017-04-22 RX ADMIN — FAMOTIDINE 20 MG: 20 TABLET ORAL at 07:55

## 2017-04-22 RX ADMIN — INSULIN ASPART 2 UNITS: 100 INJECTION, SOLUTION INTRAVENOUS; SUBCUTANEOUS at 12:26

## 2017-04-22 RX ADMIN — LABETALOL HCL 200 MG: 200 TABLET, FILM COATED ORAL at 07:54

## 2017-04-22 RX ADMIN — CINACALCET HYDROCHLORIDE 30 MG: 30 TABLET, COATED ORAL at 12:26

## 2017-04-22 RX ADMIN — AMPICILLIN SODIUM 2 G: 2 INJECTION, POWDER, FOR SOLUTION INTRAMUSCULAR; INTRAVENOUS at 06:30

## 2017-04-22 RX ADMIN — CLONIDINE HYDROCHLORIDE 0.1 MG: 0.1 TABLET ORAL at 20:36

## 2017-04-22 RX ADMIN — LABETALOL HCL 200 MG: 200 TABLET, FILM COATED ORAL at 20:36

## 2017-04-22 RX ADMIN — POLYETHYLENE GLYCOL 3350 17 G: 17 POWDER, FOR SOLUTION ORAL at 14:44

## 2017-04-22 RX ADMIN — LEVOTHYROXINE SODIUM 50 MCG: 50 TABLET ORAL at 07:55

## 2017-04-22 RX ADMIN — INSULIN ASPART 1 UNITS: 100 INJECTION, SOLUTION INTRAVENOUS; SUBCUTANEOUS at 17:38

## 2017-04-22 RX ADMIN — CLONIDINE HYDROCHLORIDE 0.1 MG: 0.1 TABLET ORAL at 07:55

## 2017-04-22 RX ADMIN — TACROLIMUS 2 MG: 1 CAPSULE ORAL at 07:54

## 2017-04-22 RX ADMIN — PRAVASTATIN SODIUM 10 MG: 10 TABLET ORAL at 07:55

## 2017-04-22 RX ADMIN — INSULIN GLARGINE 12 UNITS: 100 INJECTION, SOLUTION SUBCUTANEOUS at 23:09

## 2017-04-22 RX ADMIN — AMPICILLIN SODIUM 2 G: 2 INJECTION, POWDER, FOR SOLUTION INTRAMUSCULAR; INTRAVENOUS at 01:21

## 2017-04-22 NOTE — PROGRESS NOTES
"[facility] Internal Medicine Progress Note     Gem Jade  MRN: [id number]  YOB: 1945    Today's Date: April 21, 2017        Interval History:     Pt is much more awake today and oriented.  She does state though that she is upset about not knowing things and that she doesn't want to go home to live with her .  When asked if she feels safe there she states yes, and then starts talking about how the last time she was in the hospital everyone was nice to her.  She refused to take any medications including her noon antibiotic dose.  When asked why not, she states she wants to talk to her daughter, who is a pharmacy technician.  She states she's been unable to reach her and she doesn't have a cell phone (her  has her cell phone number though).      She has remained afebrile but still with elevated BP's and is not eating very well.  Denies CP, abd pain and SOB.  Complains of back pain but states she does not want to take anything for it.     4 pt ROS negative other than that noted above.          Physical Exam:   Vitals were reviewed  Blood pressure (!) (P) 168/36, pulse (P) 79, temperature (P) 98.6  F (37  C), temperature source (P) Oral, resp. rate (P) 18, height 1.702 m (5' 7.01\"), weight 92 kg (202 lb 12.8 oz), SpO2 (P) 97 %, not currently breastfeeding.    Physical Exam:    General: Awake, alert, NAD, laying in bed  HEENT: Oral mucosa moist,  EOM grossly intact, anicteric sclerae  Neck: Supple, no LAD  CV: RRR, normal S1S2, no murmur, clicks, rubs  Resp: CTAB anteriorly, no wheezes, rhonchi   Abd: Soft, suprapubic tenderness to deep palpation, nondistended, no rebound or guarding, BS+, no masses appreciated  Extremities: WWP, no pedal edema or cyanosis  Neuro: awake, alert and orientedx3, remembers things that were discussed earlier in the day, CN's grossly intact, 5/5 motor BUE and BLE, No lateralizing symptoms or focal deficits  Psych:  Appears withdrawn and depressed, has " some word finding difficulty and long pauses before then stating 'it doesn't matter'         Data:     All labs and imaging reviewed         Assessment and Plan:   Assessment:    Gem Jade is a 72 yo F with a history of kidney transplant (1999), neurogenic bladder with chronic rico, recurrent UTI's that present with AMS, T2DM, hypertension, and hypothyroid who was admitted with altered mental status and UTI, clinically improving but with psychosocial concerns.       # Complicated Urinary tract infection  # Neurogenic bladder w/ chronic rico  History of recurrent UTIs, most recent admission in March presenting with confusion. Previous urine cultures have grown e faecalis, e coli, and klebsiella, most of which were either pan-sensitive or resistant to ampicillin or TMP-SMX.  She did have a UTI with ESBL E. Coli in February 2016.  This admission UA with 155 WBCs. S/p ceftriaxone x 1 in ED and 2 L NS.  Urine culture growing 2 strains of Enterococcus faecalis both sensitive to ampicillin.  BCx NGTD.  - Ampicillin 2 G IV q 6 H, susceptibilities pending.   - Follow urine culture and blood cultures   - Transplant nephrology and Transplant ID consulted to discuss measures that can be taken to prevent recurrent UTI's such as suprapubic catheter placement vs antibiotic ppx. No evidence to support suprapubic catheter placement.  Regarding amoxicillin ppx, I am concerned she would grow multiple drug resistant organisms, and she has a history of C. Diff.        # Toxic metabolic encephalopathy sec to UTI - Improving but with some psychosocial concerns today.  Per chart review, she tends to have some confusion after UTI's.   - She usually takes gabapentin and Trileptal for chronic pain.  Will hold these sedating medications until MS improves.  No complaints of pain currently.    - Neuro checks q 4 H   - Consider psychiatry evaluation if continues to refuse medications.      # H/o kidney transplant -   - Tacrolimus increased  to 2 mg PO BID, per dosing prior to taking fluconazole  - Sirolimus 1.5 mg qday  - Prednisone 2.5 mg qday  - Transplant Nephrology consulted, appreciate recs  - Tacrolimus level Monday    # Hypercalcemia  - Pt has hypercalcemia when corrected for her low albumin.  Will give a dose of lasix 20 mg IV for this. This could also be contributing to her altered behavior      #T2DM  - Restart glargine at 12 units qday (half dose)  - Medium dose SSI with hypoglycemia protocol      #Hypertension   - Continue PTA clonidine 0.1 mg BID and labetalol 200 mg BID  - Lasix 20 mg IV once     # Hypothyroid  - Continue PTA synthroid 50 mcg qday    #HLD - continue home pravastatin and ASA 81 mg qday     FEN/GI - Diabetic diet, but pt not eating much.   - Zofran PRN   - famotidine 20 mg qday    Prophylaxis: DVT: SCDs.  CPAP per home settings.   Disposition: Pending improvement in clinical status and psychosocial needs.   Code Status: FULL      Patient seen and discussed with Dr. Milagros Green MD  Internal Medicine-Pediatrics, PGY4  [phone]

## 2017-04-22 NOTE — PROGRESS NOTES
Nephrology Progress Note  04/22/2017         Assessment & Recommendations:   1. LDKT - baseline Cr ~ 0.6-0.8, but has been running higher 0.9-1.1 range after several recent ROB episodes.  Now presents with ROB, likely due to urosepsis and dehydration.  Improved kidney function back to recent baseline.  2. Immunosuppression - will continue on tacrolimus and sirolimus with levels pending, as well as prednisone.  Goal level is 3-5 for both.  3. HTN/Volume status - fair BP control, above goal of less than 140/90.  Patient also remains volume up.  Would start amlodipine 5 mg daily and continue with diuresis.  4. UTI - improving on antibiotics.  Transplant ID is following her. Please assess if she will benefit from a suprapubic catheter.   5. Anemia in chronic renal disease - stable Hgb.  Recently iron replete.  Will follow.  6. Hypercalcemia - mildly elevated serum calcium, likely due to prolonged immobilization, as well as hyperparathyroidism.  Because of intermittent AMS, would consider treating hypercalcemia to rule this out as a cause.  Would start cinacalcet 30 mg daily.  7. AMS - appears to wax and wane some.  Will treat high normal/hypercalcemia to rule this out as a cause.  Could consider weaning off Clonidine.  Continue to treat infection.  8. EBV viremia - will recheck EBV PCR.  9. DM - management per primary team.    Recommendations were communicated to primary team via this note.    Ross Elizabeth MD    Interval History:   In the last 24 hours Gem Jade's kidney function is stable with creatinine ~ 1.2.  Good urine output.  Patient was reportedly more confused earlier today, but presently feels she is doing okay.  No fever, sweats or chills.  No nausea, vomiting or diarrhea.  No chest pain or shortness of breath.  Stable leg swelling.    Review of Systems:   4 point ROS was obtained and negative, except as noted in Interval History.    Physical Exam:   I/O last 3 completed shifts:  In: 420  "[P.O.:420]  Out: 1875 [Urine:1875]   /41 (BP Location: Right leg)  Pulse 70  Temp 96.8  F (36  C) (Oral)  Resp 16  Ht 1.702 m (5' 7.01\")  Wt 92 kg (202 lb 12.8 oz)  SpO2 100%  BMI 31.76 kg/m2     GENERAL APPEARANCE: alert, NAD  HENT: mouth without ulcers or lesions  PULM: lungs clear to auscultation bilaterally, equal air movement  CV: regular rhythm, normal rate     - trace to 1+ LE edema bilaterally  GI: soft, nontender, obese, bowel sounds are positive  INTEGUMENT: no rash  TX KIDNEY: normal    Labs:   All labs reviewed by me  Electrolytes/Renal -   Recent Labs   Lab Test  04/22/17   0942  04/21/17   0645  04/20/17   0714   03/09/17   0750   02/16/17   2303   09/05/16   0617   NA  136  144  144   < >  141   < >  131*   < >  135   POTASSIUM  3.5  3.7  3.8   < >  4.1   < >   --    < >  4.0   CHLORIDE  103  111*  113*   < >  107   < >   --    < >  103   CO2  22  21  22   < >  26   < >   --    < >  21   BUN  25  25  28   < >  39*   < >   --    < >  16   CR  1.16*  1.09*  1.01   < >  2.94*   < >   --    < >  0.54   GLC  222*  137*  150*   < >  138*   < >   --    < >  177*   ROGER  9.4  9.2  9.0   < >  8.9   < >   --    < >  8.4*   MAG  1.7   --    --    --    --    --   2.0   --   2.2   PHOS  2.8  2.8   --    --   3.2   < >  2.9   --    --     < > = values in this interval not displayed.       CBC -   Recent Labs   Lab Test  04/22/17 0942  04/20/17   0714  04/19/17   0005   WBC  5.0  4.0  5.2   HGB  10.5*  9.8*  9.9*   PLT  90*  104*  110*       LFTs -   Recent Labs   Lab Test  04/22/17 0942  04/19/17   0005  03/22/17   1130   ALKPHOS  100  101  77   BILITOTAL  0.7  0.2  0.1*   ALT  42  44  29   AST  24  24  25   PROTTOTAL  7.9  7.8  7.6   ALBUMIN  3.1*  3.1*  3.0*       Iron Panel -   Recent Labs   Lab Test  02/17/17   2358  09/04/16   0815  08/07/16   0750   IRON  50  124  12*   IRONSAT  29  97*  7*   GIANA  1069*  2676*  645*       Current Medications:    cinacalcet  30 mg Oral Daily     amLODIPine  5 " mg Oral Daily     insulin glargine  12 Units Subcutaneous QAM AC     ampicillin  2 g Intravenous Q6H     famotidine  20 mg Oral BID     tacrolimus  2 mg Oral BID IS     aspirin chewable tablet 81 mg  81 mg Oral Daily     cloNIDine  0.1 mg Oral BID     labetalol  200 mg Oral BID     levothyroxine  50 mcg Oral Daily     predniSONE  2.5 mg Oral Daily     pravastatin  10 mg Oral Daily     sirolimus  1.5 mg Oral Daily     insulin aspart  1-7 Units Subcutaneous TID AC     insulin aspart  1-5 Units Subcutaneous At Bedtime        Ross Elizabeth MD

## 2017-04-22 NOTE — PLAN OF CARE
"Problem: Goal Outcome Summary  Goal: Goal Outcome Summary  Outcome: No Change  AVSS on RA; mild htn. Pt more confused and delirious this shift; DC canceled. Pt would state \"there are bugs crawling on the wall\" or \"open that door (the closet), the children are in there\" or \"there's a man peeking through the window\" or \"a man came in and pulled his pants down in front of me.\" Pt at times will talk to \"people\" in room that she \"see's.\" Pt very paranoid about things.  updated on pt status x2. SW updated on pt status and reason for canceling DC. IV abx and lasix administered. Amlodipine started.  and 215 with SSI. Appetite still poor. Repositioned as pt tolerated. Johnson patent with adequate output. No BM; miralax administered--will await results. BA on for safety.      Plan: Continue to monitor and support POC. Provide reorientation as needed.    8028-6999: No new changes. Delirium continues. Pt talking to people in room and having vivid visual hallucinations and paranoia. . Abx infused.   "

## 2017-04-22 NOTE — PLAN OF CARE
"Problem: Goal Outcome Summary  Goal: Goal Outcome Summary  OT 5B: Cancel - Pt stated \"I don't want to talk to anyone right now\" upon attempt this morning. Will reschedule for tomorrow.       "

## 2017-04-22 NOTE — PROGRESS NOTES
"[facility] Internal Medicine Progress Note     Gem Jade  MRN: [id number]  YOB: 1945    Today's Date: April 21, 2017        Interval History:     Overnight pt was very emotional and tearful at times.  Not eating very much but she did eat some food that her daughter brought last night.  This morning she appeared to be more confused.  She states there were bugs that were released in her room last night, but they're gone now.  She states she is becoming very paranoid bc people including her family are talking about her in the other room (behind the wall).  Refused lab draws earlier this morning.  She states she does not know who she can trust.  She is now asking to be discharged home with her .  She is refusing to be examined this AM.     4 pt ROS negative other than that noted above.          Physical Exam:   Vitals were reviewed  Blood pressure 164/41, pulse 70, temperature 96.8  F (36  C), temperature source Oral, resp. rate 16, height 1.702 m (5' 7.01\"), weight 92 kg (202 lb 12.8 oz), SpO2 100 %, not currently breastfeeding.    Physical Exam:    General: Awake, alert, NAD, laying in bed  HEENT: Oral mucosa moist,  EOM grossly intact, anicteric sclerae  Neck: Supple,  CV: Refused exam  Resp: Refused exam   Abd: Refused exam   Extremities: Refused exam  Neuro: awake, alert and orientedx3, remembers things that were discussed earlier in the day, CN's grossly intact,  No lateralizing symptoms or focal deficits  Psych:  Appears withdrawn and depressed, has some word finding difficulty and long pauses before then stating 'it doesn't matter', much more paranoid today with some delusions         Data:     All labs and imaging reviewed         Assessment and Plan:   Assessment:    Gem Jade is a 72 yo F with a history of kidney transplant (1999), neurogenic bladder with chronic rico, recurrent UTI's that present with AMS, T2DM, hypertension, and hypothyroid who was admitted with " altered mental status and UTI, clinically improving but with concerns for worsening delirium.      # Complicated Urinary tract infection  # Neurogenic bladder w/ chronic rico  History of recurrent UTIs, most recent admission in March presenting with confusion. Previous urine cultures have grown e faecalis, e coli, and klebsiella, most of which were either pan-sensitive or resistant to ampicillin or TMP-SMX.  She did have a UTI with ESBL E. Coli in February 2016.  This admission UA with 155 WBCs. S/p ceftriaxone x 1 in ED and 2 L NS.  Urine culture growing 2 strains of Enterococcus faecalis both sensitive to ampicillin.  BCx NGTD.  - Ampicillin 2 G IV q 6 H, susceptibilities pending.   - Follow urine culture and blood cultures   - Transplant nephrology and Transplant ID consulted to discuss measures that can be taken to prevent recurrent UTI's such as suprapubic catheter placement vs antibiotic ppx. No evidence to support suprapubic catheter placement.  Regarding amoxicillin ppx, I am concerned she would grow multiple drug resistant organisms, and she has a history of C. Diff.        # Toxic metabolic encephalopathy sec to UTI and now appears to be Delirium   Per chart review and discussion with past care providers, she tends to have some confusion after UTI's.  Does not appear to have worsening infection.  Electrolytes are wnl, except Ca is upper limit of normal. No uremia. Could be 2/2 abx as this tends to be seen with UTI's.    - She usually takes gabapentin and Trileptal for chronic pain.  Will hold these sedating medications until MS improves.  No complaints of pain currently.    - Neuro checks q 4 H   - Consider psychiatry evaluation if does not improve       # H/o kidney transplant -   - Tacrolimus 2 mg PO BID  - Sirolimus 1.5 mg qday  - Prednisone 2.5 mg qday  - Transplant Nephrology consulted, appreciate recs  - Tacrolimus level Monday if still in hospital     # Hypercalcemia  - Pt has hypercalcemia when  corrected for her low albumin. Received lasix 20 mg IV for this on 4/22.  Corrected Ca today of 10.1. This could also be contributing to her altered behavior   - Cinacalcet 30 mg PO qday  - Repeat lasix 20 mg IV once today     #T2DM  - Glargine at 12 units qday (half dose) as pt not having much PO intake  - Medium dose SSI with hypoglycemia protocol      #Hypertension   - Continue PTA clonidine 0.1 mg BID and labetalol 200 mg BID  - Start amlodipine 5 mg qday  - Lasix 20 mg IV x 1     # Hypothyroid  - Continue PTA synthroid 50 mcg qday    #HLD - continue home pravastatin and ASA 81 mg qday     FEN/GI - Diabetic diet, but pt not eating much.   - Zofran PRN   - famotidine 20 mg qday    Prophylaxis: DVT: SCDs.  CPAP per home settings.   Disposition: Pending improvement in clinical status and psychosocial needs.   Code Status: FULL      Patient seen and discussed with Dr. Milagros Green MD  Internal Medicine-Pediatrics, PGY4  [phone]

## 2017-04-22 NOTE — PROGRESS NOTES
Social Work Services Progress Note    Hospital Day: 5  Date of Initial Social Work Evaluation:  4/22/2017  Collaborated with:  Bedside RN    Data:  SW consulted by MD to meet with patient about psychosocial assessment as patient indicating she does not want to return home although she previously lived with her .     Intervention:  SW completed chart review and spoke with bedside RN.  RN indicated patient is having increased delirium and is not a reliable historian. RN stated patient does not appear to have decision making capacity at this time. Due to delirium and patient's inability to reliably engage in assessment, psychosocial not completed at this time.    Assessment:  Patient indicated to medical team that she wishes to discharge to her daughter's home rather than home with her . In previous conversations with her providers, patient would indicasted feeling safe at home. SW unable to assess at this time due to patient's altered mental status.    Plan:    Anticipated Disposition:  TBD, home     Barriers to d/c plan:  Patient's safety, whether due to her increased delirium or safety concerns in the home.    Follow Up:  Weekend SW will continue to follow, unit SW to be updated.    Soo Yeon Han, [facility]  Social Work  Weekend Pager: [phone]

## 2017-04-22 NOTE — PLAN OF CARE
"Problem: Goal Outcome Summary  Goal: Goal Outcome Summary  Outcome: No Change  Alert but confused to situation. Pt was emotional and crying earlier in the night, crying d/t \"being very hot.\" Vs wnl. Temp slightly decreased in room and ice water provided. Denies pain. Repositioned q2h and prn. No BM overnight. Johnson intact and draining. Right PIV SL and left running TKO. CPAP donned prior to bed. BG before bedtime at 158. Refused to eat dinner, but ate food brought by daughter later in the evening. BG at 0200 275. Slept later in the night. IV antibiotics administered per poc.   R: Continue to monitor and report any new changes to providing team       Addendum: Pt AM /63 with pulse of 76; team paged per vital signs protocol.  "

## 2017-04-23 ENCOUNTER — APPOINTMENT (OUTPATIENT)
Dept: OCCUPATIONAL THERAPY | Facility: CLINIC | Age: 72
DRG: 698 | End: 2017-04-23
Payer: MEDICARE

## 2017-04-23 LAB
ANION GAP SERPL CALCULATED.3IONS-SCNC: 12 MMOL/L (ref 3–14)
BUN SERPL-MCNC: 23 MG/DL (ref 7–30)
CALCIUM SERPL-MCNC: 8.9 MG/DL (ref 8.5–10.1)
CHLORIDE SERPL-SCNC: 106 MMOL/L (ref 94–109)
CO2 SERPL-SCNC: 23 MMOL/L (ref 20–32)
CREAT SERPL-MCNC: 1.29 MG/DL (ref 0.52–1.04)
GFR SERPL CREATININE-BSD FRML MDRD: 41 ML/MIN/1.7M2
GLUCOSE BLDC GLUCOMTR-MCNC: 135 MG/DL (ref 70–99)
GLUCOSE BLDC GLUCOMTR-MCNC: 146 MG/DL (ref 70–99)
GLUCOSE BLDC GLUCOMTR-MCNC: 179 MG/DL (ref 70–99)
GLUCOSE BLDC GLUCOMTR-MCNC: 182 MG/DL (ref 70–99)
GLUCOSE BLDC GLUCOMTR-MCNC: 219 MG/DL (ref 70–99)
GLUCOSE SERPL-MCNC: 136 MG/DL (ref 70–99)
PHOSPHATE SERPL-MCNC: 3.1 MG/DL (ref 2.5–4.5)
POTASSIUM SERPL-SCNC: 3.3 MMOL/L (ref 3.4–5.3)
SODIUM SERPL-SCNC: 140 MMOL/L (ref 133–144)

## 2017-04-23 PROCEDURE — 99233 SBSQ HOSP IP/OBS HIGH 50: CPT | Performed by: INTERNAL MEDICINE

## 2017-04-23 PROCEDURE — 40000133 ZZH STATISTIC OT WARD VISIT

## 2017-04-23 PROCEDURE — 25000125 ZZHC RX 250: Performed by: INTERNAL MEDICINE

## 2017-04-23 PROCEDURE — 25800025 ZZH RX 258: Performed by: INTERNAL MEDICINE

## 2017-04-23 PROCEDURE — 25000132 ZZH RX MED GY IP 250 OP 250 PS 637: Mod: GY | Performed by: INTERNAL MEDICINE

## 2017-04-23 PROCEDURE — 97535 SELF CARE MNGMENT TRAINING: CPT | Mod: GO

## 2017-04-23 PROCEDURE — A9270 NON-COVERED ITEM OR SERVICE: HCPCS | Mod: GY | Performed by: INTERNAL MEDICINE

## 2017-04-23 PROCEDURE — 12000001 ZZH R&B MED SURG/OB UMMC

## 2017-04-23 PROCEDURE — 40000556 ZZH STATISTIC PERIPHERAL IV START W US GUIDANCE

## 2017-04-23 PROCEDURE — 99232 SBSQ HOSP IP/OBS MODERATE 35: CPT | Performed by: INTERNAL MEDICINE

## 2017-04-23 PROCEDURE — 25000131 ZZH RX MED GY IP 250 OP 636 PS 637: Mod: GY | Performed by: INTERNAL MEDICINE

## 2017-04-23 PROCEDURE — 84100 ASSAY OF PHOSPHORUS: CPT | Performed by: INTERNAL MEDICINE

## 2017-04-23 PROCEDURE — 00000146 ZZHCL STATISTIC GLUCOSE BY METER IP

## 2017-04-23 PROCEDURE — 80048 BASIC METABOLIC PNL TOTAL CA: CPT | Performed by: INTERNAL MEDICINE

## 2017-04-23 PROCEDURE — 36415 COLL VENOUS BLD VENIPUNCTURE: CPT | Performed by: INTERNAL MEDICINE

## 2017-04-23 RX ORDER — FAMOTIDINE 20 MG/1
20 TABLET, FILM COATED ORAL DAILY
Status: DISCONTINUED | OUTPATIENT
Start: 2017-04-24 | End: 2017-04-28 | Stop reason: HOSPADM

## 2017-04-23 RX ADMIN — AMPICILLIN SODIUM 2 G: 2 INJECTION, POWDER, FOR SOLUTION INTRAMUSCULAR; INTRAVENOUS at 21:31

## 2017-04-23 RX ADMIN — AMLODIPINE BESYLATE 5 MG: 5 TABLET ORAL at 08:13

## 2017-04-23 RX ADMIN — CLONIDINE HYDROCHLORIDE 0.1 MG: 0.1 TABLET ORAL at 21:32

## 2017-04-23 RX ADMIN — TACROLIMUS 2 MG: 1 CAPSULE ORAL at 08:16

## 2017-04-23 RX ADMIN — TACROLIMUS 2 MG: 1 CAPSULE ORAL at 18:23

## 2017-04-23 RX ADMIN — LEVOTHYROXINE SODIUM 50 MCG: 50 TABLET ORAL at 08:13

## 2017-04-23 RX ADMIN — CLONIDINE HYDROCHLORIDE 0.1 MG: 0.1 TABLET ORAL at 08:13

## 2017-04-23 RX ADMIN — SODIUM CHLORIDE, POTASSIUM CHLORIDE, SODIUM LACTATE AND CALCIUM CHLORIDE 500 ML: 600; 310; 30; 20 INJECTION, SOLUTION INTRAVENOUS at 10:19

## 2017-04-23 RX ADMIN — SIROLIMUS 1.5 MG: 1 TABLET, SUGAR COATED ORAL at 08:16

## 2017-04-23 RX ADMIN — LABETALOL HCL 200 MG: 200 TABLET, FILM COATED ORAL at 08:13

## 2017-04-23 RX ADMIN — FAMOTIDINE 20 MG: 20 TABLET ORAL at 08:13

## 2017-04-23 RX ADMIN — LABETALOL HCL 200 MG: 200 TABLET, FILM COATED ORAL at 21:32

## 2017-04-23 RX ADMIN — CINACALCET HYDROCHLORIDE 30 MG: 30 TABLET, COATED ORAL at 08:13

## 2017-04-23 RX ADMIN — INSULIN ASPART 1 UNITS: 100 INJECTION, SOLUTION INTRAVENOUS; SUBCUTANEOUS at 18:23

## 2017-04-23 RX ADMIN — AMPICILLIN SODIUM 2 G: 2 INJECTION, POWDER, FOR SOLUTION INTRAMUSCULAR; INTRAVENOUS at 15:24

## 2017-04-23 RX ADMIN — ASPIRIN 81 MG: 81 TABLET, CHEWABLE ORAL at 08:13

## 2017-04-23 RX ADMIN — PREDNISONE 2.5 MG: 2.5 TABLET ORAL at 08:13

## 2017-04-23 RX ADMIN — INSULIN GLARGINE 12 UNITS: 100 INJECTION, SOLUTION SUBCUTANEOUS at 22:08

## 2017-04-23 RX ADMIN — AMPICILLIN SODIUM 2 G: 2 INJECTION, POWDER, FOR SOLUTION INTRAMUSCULAR; INTRAVENOUS at 00:37

## 2017-04-23 RX ADMIN — PRAVASTATIN SODIUM 10 MG: 10 TABLET ORAL at 08:13

## 2017-04-23 NOTE — PLAN OF CARE
Problem: Goal Outcome Summary  Goal: Goal Outcome Summary  OT 5B: Pt seen for self-cares this session to promote increased IND and further assess cognition. Pt A7Ox4, pleasant and conversational throughout session today, although reported delusions x2, easily redirectable to task. Completed UB sponge bath, g/h tasks, and UB dressing with setup and Min A  And vc's due to visual deficits.      Per plan established by the OT, the recommendation for dc location is to home with HC.

## 2017-04-23 NOTE — PLAN OF CARE
"Problem: Goal Outcome Summary  Goal: Goal Outcome Summary  Outcome: No Change  Writer cared for pt [id number]. Alert, but continues with confusion and communicating with \"people\" in her room when there's no one there. Johnson intact and draining. Vs stable. Repositioned q2h. Denies pain. LE edematous and elevated. Left and right PIV pulled d/t infiltration; replacement ordered. Morning IV to be restarted once replacement is in place. Bedtime BG check at 140 and 0200 at 146. Slept through the night. Had large BM in the morning. Continue to monitor.          "

## 2017-04-23 NOTE — PROGRESS NOTES
"[facility] Internal Medicine Progress Note     Gem Jade  MRN: [id number]  YOB: 1945          Interval History:     Nursing notes reviewed. Continued to have hallucinations overnight. This morning, appears calm and awake. She denies any new complaints. Still poor po intake. She wants to order banana bread from kitchen, for which I liberalized her diet to regular diet.     Updated her  on the phone.     4 pt ROS negative other than that noted above.          Physical Exam:   Vitals were reviewed  Blood pressure 158/77, pulse 80, temperature 97.9  F (36.6  C), temperature source Oral, resp. rate 20, height 1.702 m (5' 7.01\"), weight 86.1 kg (189 lb 14.4 oz), SpO2 97 %, not currently breastfeeding.    Physical Exam:    General: Alert, NAD, sitting in bed.   Lungs: CTAB  CVS: RRR  Neuro: awake, alert and orientedx3. However, looking around and tracking something as if she is seeing things in the room that are not present.          Data:     All labs and imaging reviewed         Assessment and Plan:   Assessment:    Gem Jade is a 72 yo F with a history of kidney transplant (1999), neurogenic bladder with chronic rico, recurrent UTI's that present with AMS, T2DM, hypertension, and hypothyroid who was admitted with altered mental status and UTI, clinically improving but with concerns for worsening delirium.      # Complicated Urinary tract infection  # Neurogenic bladder w/ chronic rico  History of recurrent UTIs, most recent admission in March presenting with confusion. Previous urine cultures have grown e faecalis, e coli, and klebsiella, most of which were either pan-sensitive or resistant to ampicillin or TMP-SMX.  She did have a UTI with ESBL E. Coli in February 2016.  This admission UA with 155 WBCs. S/p ceftriaxone x 1 in ED and 2 L NS.  Urine culture growing 2 strains of Enterococcus faecalis both sensitive to ampicillin.  BCx NGTD.  - Ampicillin 2 G IV q 6 H  - Follow " urine culture and blood cultures   - Transplant nephrology and Transplant ID consulted to discuss measures that can be taken to prevent recurrent UTI's such as suprapubic catheter placement vs antibiotic ppx. No evidence to support suprapubic catheter placement.  Regarding amoxicillin ppx, we are concerned she would grow multiple drug resistant organisms, and she has a history of C. Diff.        # Toxic metabolic encephalopathy sec to UTI and now appears to be Delirium   Per chart review and discussion with past care providers, she tends to have some confusion after UTI's.  Does not appear to have worsening infection.  No uremia.   - She usually takes gabapentin and Trileptal for chronic pain.  Will hold these sedating medications until MS improves.  No complaints of pain currently.    - Neuro checks q 4 H   - Consider psychiatry evaluation if does not improve     # ROB: Likely prerenal in setting poor po intake and lasix. Will hold lasix and give 500 cc LR bolus today.     # H/o kidney transplant -   - Tacrolimus 2 mg PO BID  - Sirolimus 1.5 mg qday  - Prednisone 2.5 mg qday  - Transplant Nephrology consulted, appreciate recs  - Tacrolimus level Monday if still in hospital     # Hypercalcemia  - Pt has hypercalcemia when corrected for her low albumin. Received lasix 20 mg IV for this on 4/22 and 4/23. Improved to normal levels. Unlikely it was contributing to her altered behavior   - Cinacalcet 30 mg PO qday  - Hold off on further diuresis given slight increase in Cre in setting poor po intake.     #T2DM  - Glargine at 12 units qday (half dose) as pt not having much PO intake  - Medium dose SSI with hypoglycemia protocol      #Hypertension   - Continue PTA clonidine 0.1 mg BID and labetalol 200 mg BID  - Started amlodipine 5 mg qday     # Hypothyroid  - Continue PTA synthroid 50 mcg qday    #HLD - continue home pravastatin and ASA 81 mg qday     FEN/GI - Change diet to regular diet to improve oral intake.   -  Zofran PRN   - famotidine 20 mg qday    Prophylaxis: DVT: SCDs.  CPAP per home settings.   Disposition: Pending improvement in AMS  Code Status: FULL    Prudencio Linares MD

## 2017-04-23 NOTE — PLAN OF CARE
"Problem: Goal Outcome Summary  Goal: Goal Outcome Summary  Outcome: Improving  AVSS on RA. Pt in good spirits this morning and corporative in cares. More oriented today and able to remember previous conversations; still has occasional hallucinations (much more mild today). Pt did have tearful episode this afternoon; pt would not tell writer what was wrong and would just state \"I can't tell.\" Emotional support provided, and pt made aware that staff is here to support her.  and 182; pt refused noon insulin. Appetite poor; did not eat lunch or breakfast. Diet adjusted to regular. K+ 3.3 (no replacement per MD). Pt had 2 soft, incontinent BM's. Johnson patent. 500ml LR bolus infusing over 4hrs. Abx to be infused after LR bolus. Repositioning assistance provided q2hrs.      Plan: Continue to monitor and support POC.     1212-5178: No new changes. Pt's delirium and hallucinations have increased as the day has progressed.  updated on pt status. Pt currently eating stir-gooden. Occasionally yelling out. Johnson patent.  and covered with SSI.    "

## 2017-04-23 NOTE — PROGRESS NOTES
Nephrology Progress Note  04/23/2017         Assessment & Recommendations:   1. LDKT - baseline Cr ~ 0.6-0.8, but has been running higher 0.9-1.1 range after several recent ROB episodes.  Now presents with ROB, likely due to urosepsis and dehydration.  Kidney function has been mostly stable now, near baseline.  2. Immunosuppression - will continue on tacrolimus and sirolimus with levels pending, as well as prednisone.  Goal level is 3-5 for both.  3. HTN/Volume status - fair BP control, above goal of less than 140/90.  Patient also remains volume up.  Would start amlodipine 5 mg daily and continue with diuresis as you are.  4. UTI - improving on antibiotics.  Transplant ID is following her. Please assess if she will benefit from a suprapubic catheter.   5. Anemia in chronic renal disease - stable Hgb, although not checked today.  Recently iron replete.  Will follow.  6. Hypercalcemia - decreased, now normal serum calcium after starting cinacalcet.  Underlying etiology likely due to prolonged immobilization, as well as hyperparathyroidism.  No changes.  7. AMS - appears to wax and wane some, but better this morning.  Will continue to treat high normal/hypercalcemia to rule this out as a cause.  Could consider weaning off Clonidine.  Continue to treat infection.  8. EBV viremia - will recheck EBV PCR.  9. DM - management per primary team.    Recommendations were communicated to primary team via this note.    Ross Elizabeth MD    Interval History:   In the last 24 hours Gem Jade's kidney function is stable with creatinine ~ 1.3 today.  Good urine output with net negative ~ 2.5 liters.  Decreased serum calcium, now normal.  Patient reports feeling better.  No fever, sweats or chills.  No chest pain or shortness of breath.  No nausea, vomiting or diarrhea.  She doesn't feel puffy.    Review of Systems:   4 point ROS was obtained and negative, except as noted in Interval History.    Physical Exam:   I/O  "last 3 completed shifts:  In: 50 [P.O.:50]  Out: 2850 [Urine:2850]   /77 (BP Location: Left arm)  Pulse 80  Temp 97.9  F (36.6  C) (Oral)  Resp 20  Ht 1.702 m (5' 7.01\")  Wt 86.1 kg (189 lb 14.4 oz)  SpO2 97%  BMI 29.74 kg/m2     GENERAL APPEARANCE: alert, NAD  HENT: mouth without ulcers or lesions  PULM: lungs clear to auscultation bilaterally, equal air movement  CV: regular rhythm, normal rate     - trace LE edema bilaterally  GI: soft, nontender, obese, bowel sounds are positive  INTEGUMENT: no rash  TX KIDNEY: normal    Labs:   All labs reviewed by me  Electrolytes/Renal -   Recent Labs   Lab Test  04/23/17   0800  04/22/17   0942  04/21/17   0645   02/16/17   2303   09/05/16   0617   NA  140  136  144   < >  131*   < >  135   POTASSIUM  3.3*  3.5  3.7   < >   --    < >  4.0   CHLORIDE  106  103  111*   < >   --    < >  103   CO2  23  22  21   < >   --    < >  21   BUN  23  25  25   < >   --    < >  16   CR  1.29*  1.16*  1.09*   < >   --    < >  0.54   GLC  136*  222*  137*   < >   --    < >  177*   ROGER  8.9  9.4  9.2   < >   --    < >  8.4*   MAG   --   1.7   --    --   2.0   --   2.2   PHOS  3.1  2.8  2.8   < >  2.9   --    --     < > = values in this interval not displayed.       CBC -   Recent Labs   Lab Test  04/22/17   0942  04/20/17   0714  04/19/17   0005   WBC  5.0  4.0  5.2   HGB  10.5*  9.8*  9.9*   PLT  90*  104*  110*       LFTs -   Recent Labs   Lab Test  04/22/17   0942  04/19/17   0005  03/22/17   1130   ALKPHOS  100  101  77   BILITOTAL  0.7  0.2  0.1*   ALT  42  44  29   AST  24  24  25   PROTTOTAL  7.9  7.8  7.6   ALBUMIN  3.1*  3.1*  3.0*       Iron Panel -   Recent Labs   Lab Test  02/17/17   2358  09/04/16   0815  08/07/16   0750   IRON  50  124  12*   IRONSAT  29  97*  7*   GIANA  1069*  7006*  645*       Current Medications:    lactated ringers  500 mL Intravenous Once     cinacalcet  30 mg Oral Daily     amLODIPine  5 mg Oral Daily     insulin glargine  12 Units Subcutaneous " QAM AC     ampicillin  2 g Intravenous Q6H     famotidine  20 mg Oral BID     tacrolimus  2 mg Oral BID IS     aspirin chewable tablet 81 mg  81 mg Oral Daily     cloNIDine  0.1 mg Oral BID     labetalol  200 mg Oral BID     levothyroxine  50 mcg Oral Daily     predniSONE  2.5 mg Oral Daily     pravastatin  10 mg Oral Daily     sirolimus  1.5 mg Oral Daily     insulin aspart  1-7 Units Subcutaneous TID AC     insulin aspart  1-5 Units Subcutaneous At Bedtime        Ross Elizabeth MD

## 2017-04-24 ENCOUNTER — APPOINTMENT (OUTPATIENT)
Dept: OCCUPATIONAL THERAPY | Facility: CLINIC | Age: 72
DRG: 698 | End: 2017-04-24
Payer: MEDICARE

## 2017-04-24 ENCOUNTER — APPOINTMENT (OUTPATIENT)
Dept: CARDIOLOGY | Facility: CLINIC | Age: 72
DRG: 698 | End: 2017-04-24
Attending: INTERNAL MEDICINE
Payer: MEDICARE

## 2017-04-24 LAB
ALBUMIN UR-MCNC: 100 MG/DL
ANION GAP SERPL CALCULATED.3IONS-SCNC: 9 MMOL/L (ref 3–14)
APPEARANCE UR: ABNORMAL
BACTERIA #/AREA URNS HPF: ABNORMAL /HPF
BILIRUB UR QL STRIP: NEGATIVE
BUN SERPL-MCNC: 26 MG/DL (ref 7–30)
CALCIUM SERPL-MCNC: 8.4 MG/DL (ref 8.5–10.1)
CHLORIDE SERPL-SCNC: 105 MMOL/L (ref 94–109)
CO2 SERPL-SCNC: 25 MMOL/L (ref 20–32)
COLOR UR AUTO: ABNORMAL
CREAT SERPL-MCNC: 1.62 MG/DL (ref 0.52–1.04)
CREAT UR-MCNC: 16 MG/DL
EOSINOPHIL SPEC QL WRIGHT STN: NORMAL
GFR SERPL CREATININE-BSD FRML MDRD: 31 ML/MIN/1.7M2
GLUCOSE BLDC GLUCOMTR-MCNC: 158 MG/DL (ref 70–99)
GLUCOSE BLDC GLUCOMTR-MCNC: 166 MG/DL (ref 70–99)
GLUCOSE BLDC GLUCOMTR-MCNC: 178 MG/DL (ref 70–99)
GLUCOSE BLDC GLUCOMTR-MCNC: 288 MG/DL (ref 70–99)
GLUCOSE SERPL-MCNC: 201 MG/DL (ref 70–99)
GLUCOSE UR STRIP-MCNC: 150 MG/DL
HGB UR QL STRIP: ABNORMAL
KETONES UR STRIP-MCNC: NEGATIVE MG/DL
LEUKOCYTE ESTERASE UR QL STRIP: ABNORMAL
MAGNESIUM SERPL-MCNC: 1.6 MG/DL (ref 1.6–2.3)
MUCOUS THREADS #/AREA URNS LPF: PRESENT /LPF
NITRATE UR QL: NEGATIVE
PH UR STRIP: 7 PH (ref 5–7)
PHOSPHATE SERPL-MCNC: 3.3 MG/DL (ref 2.5–4.5)
POTASSIUM SERPL-SCNC: 3.2 MMOL/L (ref 3.4–5.3)
PROT UR-MCNC: 2.49 G/L
PROT/CREAT 24H UR: 15.28 G/G CR (ref 0–0.2)
RBC #/AREA URNS AUTO: 8 /HPF (ref 0–2)
SODIUM SERPL-SCNC: 139 MMOL/L (ref 133–144)
SP GR UR STRIP: 1.01 (ref 1–1.03)
SPECIMEN SOURCE: NORMAL
TACROLIMUS BLD-MCNC: 6.7 UG/L (ref 5–15)
TME LAST DOSE: NORMAL H
TRANS CELLS #/AREA URNS HPF: 1 /HPF (ref 0–1)
URN SPEC COLLECT METH UR: ABNORMAL
UROBILINOGEN UR STRIP-MCNC: NORMAL MG/DL (ref 0–2)
WBC #/AREA URNS AUTO: >182 /HPF (ref 0–2)
WBC CLUMPS #/AREA URNS HPF: PRESENT /HPF

## 2017-04-24 PROCEDURE — 25000131 ZZH RX MED GY IP 250 OP 636 PS 637: Mod: GY | Performed by: INTERNAL MEDICINE

## 2017-04-24 PROCEDURE — 97535 SELF CARE MNGMENT TRAINING: CPT | Mod: GO

## 2017-04-24 PROCEDURE — 93306 TTE W/DOPPLER COMPLETE: CPT | Mod: 26 | Performed by: INTERNAL MEDICINE

## 2017-04-24 PROCEDURE — 25000125 ZZHC RX 250: Performed by: INTERNAL MEDICINE

## 2017-04-24 PROCEDURE — 81001 URINALYSIS AUTO W/SCOPE: CPT | Performed by: INTERNAL MEDICINE

## 2017-04-24 PROCEDURE — 00000146 ZZHCL STATISTIC GLUCOSE BY METER IP

## 2017-04-24 PROCEDURE — 83735 ASSAY OF MAGNESIUM: CPT | Performed by: INTERNAL MEDICINE

## 2017-04-24 PROCEDURE — 25000132 ZZH RX MED GY IP 250 OP 250 PS 637: Mod: GY | Performed by: INTERNAL MEDICINE

## 2017-04-24 PROCEDURE — 80197 ASSAY OF TACROLIMUS: CPT | Performed by: INTERNAL MEDICINE

## 2017-04-24 PROCEDURE — 84156 ASSAY OF PROTEIN URINE: CPT | Performed by: INTERNAL MEDICINE

## 2017-04-24 PROCEDURE — 12000001 ZZH R&B MED SURG/OB UMMC

## 2017-04-24 PROCEDURE — 36415 COLL VENOUS BLD VENIPUNCTURE: CPT | Performed by: INTERNAL MEDICINE

## 2017-04-24 PROCEDURE — 87086 URINE CULTURE/COLONY COUNT: CPT | Performed by: INTERNAL MEDICINE

## 2017-04-24 PROCEDURE — A9270 NON-COVERED ITEM OR SERVICE: HCPCS | Mod: GY | Performed by: INTERNAL MEDICINE

## 2017-04-24 PROCEDURE — 99232 SBSQ HOSP IP/OBS MODERATE 35: CPT | Mod: GC | Performed by: INTERNAL MEDICINE

## 2017-04-24 PROCEDURE — 89190 NASAL SMEAR FOR EOSINOPHILS: CPT | Performed by: INTERNAL MEDICINE

## 2017-04-24 PROCEDURE — 84100 ASSAY OF PHOSPHORUS: CPT | Performed by: INTERNAL MEDICINE

## 2017-04-24 PROCEDURE — 40000133 ZZH STATISTIC OT WARD VISIT

## 2017-04-24 PROCEDURE — 93306 TTE W/DOPPLER COMPLETE: CPT

## 2017-04-24 PROCEDURE — 80048 BASIC METABOLIC PNL TOTAL CA: CPT | Performed by: INTERNAL MEDICINE

## 2017-04-24 RX ORDER — AMPICILLIN 2 G/1
2 INJECTION, POWDER, FOR SOLUTION INTRAVENOUS EVERY 8 HOURS
Status: DISCONTINUED | OUTPATIENT
Start: 2017-04-24 | End: 2017-04-28 | Stop reason: HOSPADM

## 2017-04-24 RX ORDER — POTASSIUM CHLORIDE 1.5 G/1.58G
40 POWDER, FOR SOLUTION ORAL ONCE
Status: COMPLETED | OUTPATIENT
Start: 2017-04-24 | End: 2017-04-24

## 2017-04-24 RX ADMIN — LEVOTHYROXINE SODIUM 50 MCG: 50 TABLET ORAL at 09:00

## 2017-04-24 RX ADMIN — POTASSIUM CHLORIDE 40 MEQ: 1.5 POWDER, FOR SOLUTION ORAL at 13:18

## 2017-04-24 RX ADMIN — AMPICILLIN SODIUM 2 G: 2 INJECTION, POWDER, FOR SOLUTION INTRAMUSCULAR; INTRAVENOUS at 22:23

## 2017-04-24 RX ADMIN — ASPIRIN 81 MG: 81 TABLET, CHEWABLE ORAL at 09:00

## 2017-04-24 RX ADMIN — PRAVASTATIN SODIUM 10 MG: 10 TABLET ORAL at 08:59

## 2017-04-24 RX ADMIN — AMPICILLIN SODIUM 2 G: 2 INJECTION, POWDER, FOR SOLUTION INTRAMUSCULAR; INTRAVENOUS at 01:59

## 2017-04-24 RX ADMIN — AMPICILLIN SODIUM 2 G: 2 INJECTION, POWDER, FOR SOLUTION INTRAMUSCULAR; INTRAVENOUS at 08:54

## 2017-04-24 RX ADMIN — PREDNISONE 2.5 MG: 2.5 TABLET ORAL at 08:59

## 2017-04-24 RX ADMIN — SIROLIMUS 1.5 MG: 1 TABLET, SUGAR COATED ORAL at 08:56

## 2017-04-24 RX ADMIN — TACROLIMUS 2 MG: 1 CAPSULE ORAL at 17:58

## 2017-04-24 RX ADMIN — TACROLIMUS 2 MG: 1 CAPSULE ORAL at 08:59

## 2017-04-24 RX ADMIN — INSULIN ASPART 2 UNITS: 100 INJECTION, SOLUTION INTRAVENOUS; SUBCUTANEOUS at 09:01

## 2017-04-24 RX ADMIN — CLONIDINE HYDROCHLORIDE 0.1 MG: 0.1 TABLET ORAL at 08:59

## 2017-04-24 RX ADMIN — INSULIN ASPART 1 UNITS: 100 INJECTION, SOLUTION INTRAVENOUS; SUBCUTANEOUS at 17:58

## 2017-04-24 RX ADMIN — AMPICILLIN SODIUM 2 G: 2 INJECTION, POWDER, FOR SOLUTION INTRAMUSCULAR; INTRAVENOUS at 13:18

## 2017-04-24 RX ADMIN — LABETALOL HCL 200 MG: 200 TABLET, FILM COATED ORAL at 09:00

## 2017-04-24 RX ADMIN — CINACALCET HYDROCHLORIDE 30 MG: 30 TABLET, COATED ORAL at 08:59

## 2017-04-24 RX ADMIN — CLONIDINE HYDROCHLORIDE 0.1 MG: 0.1 TABLET ORAL at 19:08

## 2017-04-24 RX ADMIN — INSULIN GLARGINE 12 UNITS: 100 INJECTION, SOLUTION SUBCUTANEOUS at 22:24

## 2017-04-24 RX ADMIN — FAMOTIDINE 20 MG: 20 TABLET ORAL at 09:00

## 2017-04-24 RX ADMIN — AMLODIPINE BESYLATE 5 MG: 5 TABLET ORAL at 09:00

## 2017-04-24 RX ADMIN — LABETALOL HCL 200 MG: 200 TABLET, FILM COATED ORAL at 19:08

## 2017-04-24 NOTE — PHARMACY-CONSULT NOTE
Pharmacy Delirium Chart Review    Upon chart review, the following medications may contribute to possible patient delirium:     Famotidine: Renally dosed (dose was 20mg BID pta, now 20mg daily)  Clonidine: Gem is receiving her pta dose  Prednisone: Randall is receiving her pta dose     Please consult unit pharmacist with further questions.    Jl Muhammad PharmD, [facility]    [phone]  Pager [phone]

## 2017-04-24 NOTE — PLAN OF CARE
"Problem: Goal Outcome Summary  Goal: Goal Outcome Summary  Outcome: No Change  Writer cared for pt [phone]. Vs wnl. Assist of two with cares. Alert and continues with confusion; stating that \"there's a man hiding underneath my bed.\" Denies pain. Johnson intact and draining. Repositioned q2h. Had large BM late in the evening. CPAP on overnight. Slept through the night.  and 288. Peripheral on right FA running TKO following antibiotic administration.   R: Continue to monitor and update providers with any new changes          "

## 2017-04-24 NOTE — PROGRESS NOTES
[facility]  Transplant Infectious Disease Progress Note     Patient:  Gem Jade, Date of birth 1945, Medical record number [id number]  Date of Visit:  04/24/2017         Assessment and Recommendations:   Recommendations:  - agree with ampicillin 2gm IV q6hrs and transition to oral amoxicillin to treat complicated UTI  - could consider supra-pubic catheter however there is not strong data to support decreased risk of UTIs in patient's with neurogenic bladder compared to transurethral catheterization  - if antibiotic ppx is desired, we would recommend amoxicillin 250mg PO BID for one month and then decreasing to 250mg daily  - follow up serum PCR for  EBV    Thank you very much for this consultation. Transplant Infectious Disease will sign off at this time. Please feel free to call us with any additional questions, concerns, or positive results.      Assessment:  Ms. Jade is a 71 year old female with PMH of DM2 with neuropathy, retinopathy and nephropathy s/p kidney transplant in 1999 on sirolimus, tacrolimus and prednisone, chronic indwelling catheter with recurrent UTIs (4x/year), and bed bound due to polyneuropathy and vertigo admitted on 4/18 with altered mental status found to have concern for UTI.       Infectious Disease issues include:  - Complicated UTI - Patient with altered mental status and no other focal signs of infection. Has neurogenic bladder with poor sensation, so has not noticed any urinary symptoms. Johnson replaced on 4/20/2017. UA with 155 WBC and large LE (no nitrities). Urine culture pending with 50-100K of 2 strains of Enterococcus species. Hx of recurrent UTIs with previous ESBL-producing bacteria (12/2015) and more recent susceptible isolates. Regarding ppx, would minimize antibiotics if possible given history of C diff infection, however amoxicillin may be a reasonable choice given patient's recent susceptibilities.  - Altered Mental Status - patient  with altered mental status at baseline per review of recent PCP and pharmacist notes, however acute change reported per  on admission. Currently is oriented and verbose without focal signs concerning for infection or acute CNS event. Likely worsened due to infection in addition to baseline confusion.  - QTc interval: 445msec (4/18/2017)  - Bacterial prophylaxis: None  - Pneumocystis prophylaxis: None  - Viral serostatus & prophylaxis: CMV+, EBV+, no ppx currently  - Fungal prophylaxis: None  - Immunization status: Up to date  - Gamma globulin status: Unknown  - Isolation status: Good hand hygiene. Contact for history of ESBL-producing E coli (urine 12/2015).    Patient seen and discussed with Dr. Bubba Marroquin, ID staff attending. Recommendations discussed with Dr. Green.      Khloe Nolasco MD, pgy6   Fellow in Pediatric and Adult Infectious Disease  pager: [phone]        Interval History:   Afebrile. Hypertensive. Remains confused. Denied any fevers, pain, diarrhea, or rash.      Transplants:  10/27/1999 (Kidney), Postoperative day:  6389.  Coordinator Jorgito Jacinto    Review of Systems:   CONSTITUTIONAL:  No fevers or chills  RESPIRATORY:  negative for cough, sputum or dyspnea  GASTROINTESTINAL:  negative for nausea, vomiting, diarrhea or constipation  INTEGUMENT:  negative for rash or pruritus         Current Medications & Allergies:       famotidine  20 mg Oral Daily     cinacalcet  30 mg Oral Daily     amLODIPine  5 mg Oral Daily     insulin glargine  12 Units Subcutaneous QAM AC     ampicillin  2 g Intravenous Q6H     tacrolimus  2 mg Oral BID IS     aspirin chewable tablet 81 mg  81 mg Oral Daily     cloNIDine  0.1 mg Oral BID     labetalol  200 mg Oral BID     levothyroxine  50 mcg Oral Daily     predniSONE  2.5 mg Oral Daily     pravastatin  10 mg Oral Daily     sirolimus  1.5 mg Oral Daily     insulin aspart  1-7 Units Subcutaneous TID AC     insulin aspart  1-5 Units Subcutaneous At  Bedtime       Infusions/Drips: None       Allergies   Allergen Reactions     Bactrim Ds [Sulfamethoxazole W/Trimethoprim]      Creatinine level increased     Atorvastatin Calcium      myalgias, muscle aches     Codeine Nausea and Vomiting     Darvocet [Propoxyphene N-Apap] Nausea and Vomiting     Hydromorphone      Levofloxacin Other (See Comments) and Diarrhea     hallucinations     Morphine      Nausea and vomiting     Niaspan [Niacin] GI Disturbance     Flushing even at low dose, GI upset     Percocet [Oxycodone-Acetaminophen]      Vomiting after taking med couple of times     Vicodin [Hydrocodone-Acetaminophen] Nausea and Vomiting            Physical Exam:   Vitals were reviewed.  All vitals stable.  Patient Vitals for the past 24 hrs:   BP Temp Temp src Pulse Resp SpO2 Weight   04/24/17 0653 156/56 98.1  F (36.7  C) Oral 71 18 98 % -   04/24/17 0203 163/52 98.1  F (36.7  C) Oral - 18 97 % -   04/23/17 2223 151/59 98.1  F (36.7  C) Oral 81 18 97 % -   04/23/17 2132 (!) 150/35 - - 76 - - -   04/23/17 2012 152/66 96.3  F (35.7  C) Oral 78 18 92 % 195 lb 14.4 oz (88.9 kg)   04/23/17 1459 170/68 98.5  F (36.9  C) Oral 77 18 96 % -     Ranges for vital signs:  Temp:  [96.3  F (35.7  C)-98.5  F (36.9  C)] 98.1  F (36.7  C)  Pulse:  [71-81] 71  Heart Rate:  [78] 78  Resp:  [18] 18  BP: (150-170)/(35-68) 156/56  SpO2:  [92 %-98 %] 98 %  Vitals:    04/21/17 1555 04/22/17 1700 04/23/17 2012   Weight: 202 lb 12.8 oz (92 kg) 189 lb 14.4 oz (86.1 kg) 195 lb 14.4 oz (88.9 kg)       Physical Examination:   GENERAL: well-developed, well-nourished, in bed in no acute distress.  HEAD: Head is normocephalic, atraumatic   EYES: Eyes have anicteric sclerae without conjunctival injection   ENT: Oropharynx is moist without exudates or ulcers. Tongue is midline.  NECK: Supple. No cervical lymphadenopathy.  LUNGS: Clear to auscultation bilaterally.  CARDIOVASCULAR: Regular rate and rhythm with no murmurs, gallops or rubs.  ABDOMEN:  Normal bowel sounds, soft, nontender. No appreciable hepatosplenomegaly.  SKIN: No acute rashes. Well healed scars of bilateral knees. Line in place without any surrounding erythema or exudate.  NEUROLOGIC: Alert and oriented. Tangential speech. Grossly nonfocal. Active x4 extremities         Laboratory Data:     Absolute CD4   Date Value Ref Range Status   02/17/2017 379 (L) 441 - 2156 cells/uL Final       Inflammatory Markers  Recent Labs   Lab Test  03/08/17   1243  08/31/16   0801  05/25/16   2116  02/18/16   1436  04/28/15   1650  12/29/14   1330   SED   --    --   53*   --    --    --    CRP  7.5  39.0*  <2.9  8.8*  145.0*  114.0*       Metabolic Studies     Recent Labs   Lab Test  04/24/17   0611  04/23/17   0800  04/22/17   0942   04/19/17   0005   03/09/17   1310   03/08/17   1243   12/05/16   1210   08/31/16   0801   08/29/16   2157   08/07/12   0647   NA  139  140  136   < >  138   < >   --    < >  135   < >   --    < >  137   < >  133   < >  135   POTASSIUM  3.2*  3.3*  3.5   < >  4.1   < >   --    < >  4.6   < >   --    < >  3.8   < >  4.5   < >  5.9*   CHLORIDE  105  106  103   < >  104   < >   --    < >  98   < >   --    < >  106   < >  94   < >  104   CO2  25  23  22   < >  27   < >   --    < >  28   < >   --    < >  23   < >  32   < >  22   ANIONGAP  9  12  10   < >  7   < >   --    < >  8   < >   --    < >  8   < >  7   < >  8   BUN  26  23  25   < >  36*   < >   --    < >  42*   < >   --    < >  29   < >  47*   < >  21   CR  1.62*  1.29*  1.16*   < >  1.53*   < >   --    < >  3.37*   < >   --    < >  0.98   < >  1.70*   < >  1.49*   GFRESTIMATED  31*  41*  46*   < >  34*   < >   --    < >  13*   < >   --    < >  56*   < >  30*   < >  35*   GLC  201*  136*  222*   < >  98   < >   --    < >  61*   < >   --    < >  146*   < >  199*   < >  79   A1C   --    --    --    --    --    --    --    --    --    --   7.4*   --    --    --    --    < >   --    ROGER  8.4*  8.9  9.4   < >  9.2   < >   --    < >   10.2*   < >   --    < >  9.2   < >  10.6*   < >  8.6   PHOS  3.3  3.1  2.8   < >   --    --    --    < >   --    < >   --    --    --    --    --    < >   --    MAG   --    --   1.7   --    --    --    --    --    --    < >   --    < >   --    --    --    < >   --    LACT   --    --    --    --   1.0   --    --    --   0.6*   < >   --    --    --    --    --    < >   --    PCAL   --    --    --    --    --    --    --    --    --    --    --    --   0.09   < >   --    < >   --    FGTL   --    --    --    --    --    --    --    --    --    --    --    --    --    --   459   --    --    CKT   --    --    --    --    --    --   36   --    --    --    --    --    --    --    --    --   <20*    < > = values in this interval not displayed.       Hepatic Studies  Recent Labs   Lab Test  04/22/17   0942  04/19/17   0005  03/22/17   1130   09/03/16   1329  08/30/16   1709   09/03/13   0725   BILITOTAL  0.7  0.2  0.1*   < >   --   0.5   < >  0.3   BILIDELTA   --    --    --    --    --    --    --   0.3   BILICONJ   --    --    --    --    --    --    --   0.0   DBIL  0.1   --   <0.1   --    --    --    < >   --    ALKPHOS  100  101  77   < >   --   69   < >  77   PROTTOTAL  7.9  7.8  7.6   < >   --   7.1   < >  5.9*   ALBUMIN  3.1*  3.1*  3.0*   < >   --   3.0*   < >  2.6*   AST  24  24  25   < >   --   8   < >  17   ALT  42  44  29   < >   --   8   < >  27   LDH   --    --    --    --   150  157   --    --     < > = values in this interval not displayed.       Pancreatitis testing  Recent Labs   Lab Test  04/19/17   0005  12/05/16   1210   09/02/13   0053  06/29/13   1711   LIPASE  190   --    --   92  125   TRIG   --   272*   < >   --    --     < > = values in this interval not displayed.       Gout Labs      Recent Labs   Lab Test  02/17/17   2358   URIC  7.5*       Hematology Studies    Recent Labs   Lab Test  04/22/17   0942  04/20/17   0714  04/19/17   0005  04/03/17   1200  03/22/17   1130  03/15/17   0731   WBC  5.0   4.0  5.2  4.3  4.7  4.6   ANEU   --    --   3.7   --   2.9   --    ALYM   --    --   1.0   --   1.3   --    CARROLL   --    --   0.3   --   0.4   --    AEOS   --    --   0.2   --   0.2   --    HGB  10.5*  9.8*  9.9*  9.3*  9.3*  8.4*   HCT  33.3*  31.7*  32.7*  29.8*  29.8*  27.3*   PLT  90*  104*  110*  143*  124*  115*       Clotting Studies    Recent Labs   Lab Test  03/08/17   1243  09/02/16   0838  09/01/16   1800  08/31/16   0801   08/07/16   0750   INR  1.01  1.09  1.13  0.98   < >  1.12   PTT   --    --    --    --    --   38*    < > = values in this interval not displayed.       Iron Testing  Recent Labs   Lab Test  04/22/17   0942   03/09/17   1310   02/17/17   2358   09/04/16   0815  09/03/16   1329   08/29/16   2316   08/07/16   0750   IRON   --    --    --    --   50   --   124   --    --    --    --   12*   FEB   --    --    --    --   170*   --   128*   --    --    --    --   166*   IRONSAT   --    --    --    --   29   --   97*   --    --    --    --   7*   GIANA   --    --    --    --   1069*   --   2676*   --    --    --    --   645*   MCV  91   < >   --    < >   --    < >  94   --    < >   --    < >  106*   FOLIC   --    --   5.2*   --   8.5   --    --    --    < >   --    --   10.4   B12   --    --   1402*   --   1031*   --    --    --    < >   --    --   131*   HAPT   --    --    --    --    --    --    --   94   < >   --    --    --    RETP   --    --    --    --    --    --    --    --    --   0.3*   --    --    RETICABSCT   --    --    --    --    --    --    --    --    --   7.4*   --    --     < > = values in this interval not displayed.       Arterial Blood Gas Testing  Recent Labs   Lab Test  01/10/16   1455  03/18/14   1120  03/18/14   0115  05/19/13   0705 07/13/12   PH  7.40  7.42  7.42  7.36   --    PCO2  49*  40  43  50*   --    PO2  323*  176*  245*  126*   --    HCO3  30*  26  28  29*  <10   O2PER  58   --   15LPR  3LPM   --         Thyroid Studies     Recent Labs   Lab Test   03/09/17   1310  08/30/16   0032  05/26/16   0744  03/18/14   1138  06/29/13   2153   TSH  1.20  1.01  0.96  0.68  1.30   T4  0.72*   --   1.00   --    --        Urine Studies   Recent Labs   Lab Test  04/19/17   0019  03/08/17   1300  03/03/17   1800  02/16/17   2036  02/13/17   1845   URINEPH  6.0  6.5  8.0*  6.5  6.0   NITRITE  Negative  Negative  Negative  Positive*  Positive*   LEUKEST  Large*  Large*  Large*  Large*  Large*   WBCU  155*  >182*  43*  >182*  >182*       CSF testing   Recent Labs   Lab Test  09/02/16   0940   CWBC  0  Duplicate request  Test not indicated.     CRBC  2  Duplicate request  Test not indicated.     CGLU  89*   CTP  44       Medication levels  Recent Labs   Lab Test  04/20/17   0714   08/29/13   1310  07/16/13   1220   VANCOMYCIN   --    --   <5.0 Traditional Dosing therapeutic Range:         Trough 8-20 mg/L         Peak 20-50 mg/L   --    TACROL  3.1*   < >   --   3.6*   RAPAMY  3.3*   < >   --    --    MPACID   --    --    --   3.13   MPAG   --    --    --   98.3*    < > = values in this interval not displayed.       Microbiology:  Beta D Glucan levels (Fungitell assay)    Recent Labs   Lab Test  08/29/16   2157   FGTL  459       Last Culture results with specimen source  Culture Micro   Date Value Ref Range Status   04/19/2017 No growth after 5 days  Final   04/19/2017 (A)  Final    50,000 to 100,000 colonies/mL Enterococcus faecalis  50,000 to 100,000 colonies/mL Strain 2 Enterococcus faecalis     04/19/2017 No growth after 5 days  Final   03/08/2017 No growth  Final   03/08/2017 (A)  Final    >100,000 colonies/mL Enterococcus faecalis  >100,000 colonies/mL Candida albicans / dubliniensis Candida albicans and Candida   dubliniensis are not routinely speciated Susceptibility testing not routinely   done     03/08/2017 No growth  Final   03/03/2017 (A)  Final    50,000 to 100,000 colonies/mL Candida albicans / dubliniensis Candida albicans   and Candida dubliniensis are not  routinely speciated Susceptibility testing not   routinely done     02/21/2017   Final    Canceled, Test credited Should be ordered as a future order   02/16/2017 No growth  Final   02/16/2017 (A)  Final    10,000 to 50,000 colonies/mL Escherichia coli  >100,000 colonies/mL Candida albicans / dubliniensis Candida albicans and Candida   dubliniensis are not routinely speciated      Specimen Description   Date Value Ref Range Status   04/19/2017 Blood Left Arm  Final   04/19/2017 Unspecified Urine  Final   04/19/2017 Blood Right Arm  Final   03/08/2017 Blood Left Arm  Final   03/08/2017 Catheterized Urine  Final   03/08/2017 Blood Right Arm  Final   03/07/2017 Feces  Final   03/03/2017 Unspecified Urine  Final   02/21/2017 Unknown  Final   02/16/2017 Blood Unspecified Site VAD Collection  Final        Last check of C difficile  C Diff Toxin B PCR   Date Value Ref Range Status   03/07/2017  NEG Final    Negative  Negative: Clostridium difficile target DNA sequences NOT detected, presumed   negative for Clostridium difficile toxin B or the number of bacteria present   may be below the limit of detection for the test.   FDA approved assay performed using [employer] GeneXpert real-time PCR.   A negative result does not exclude actual disease due to Clostridium difficile   and may be due to improper collection, handling and storage of the specimen or   the number of organisms in the specimen is below the detection limit of the   assay.         Virology:  Log IU/mL of CMVQNT   Date Value Ref Range Status   04/20/2017 Not Calculated <2.1 [Log_IU]/mL Final   03/09/2017 Not Calculated <2.1 [Log_IU]/mL Final   09/02/2016 <2.1 <2.1 [Log_IU]/mL Final   08/31/2016 Not Calculated <2.1 [Log_IU]/mL Final   05/28/2016 Not Calculated <2.1 [Log_IU]/mL Final       EB Virus DNA Quant Copy/mL   Date Value Ref Range Status   09/02/2016 <390  Unit: cpy/mL    Final     EBV DNA Copies/mL   Date Value Ref Range Status   03/10/2017 74270 (A) EBVNEG  [Copies]/mL Final   02/17/2017 084203 (A) EBVNEG [Copies]/mL Final   08/30/2016 2393 (A) EBVNEG [Copies]/mL Final   05/28/2016 9498 (A) EBVNEG [Copies]/mL Final       BK viral loads   Recent Labs   Lab Test  04/20/17   2021  03/09/17   1310  02/17/17   2358  10/12/12   0713  11/07/10   1250   BKSPEC  Plasma  Plasma, EDTA anticoagulant  CORRECTED ON 03/09 AT 1319: PREVIOUSLY REPORTED AS Plasma    Plasma  Plasma, EDTA anticoagulant  Plasma, EDTA anticoagulant   BKRES  BK Virus DNA Not Detected  BK Virus DNA Not Detected  BK Virus DNA Not Detected  <1000  <1000       Parvovirus Testing  Recent Labs   Lab Test  09/02/16   0838   PRVG  0.28   PRVM  0.11   PRVSP  EDTA PLASMA   PRVPC  Not Detected  (Note)  NOT DETECTED - A negative result does not rule out the  presence of PCR inhibitors in the patient specimen or assay  specific nucleic acid in concentrations below the level of  detection by the assay.  INTERPRETIVE INFORMATION: Parvovirus B19 By PCR  Test developed and characteristics determined by [facility]. See Compliance Statement B: [facility]  Performed by [facility],  [address] [place] [id number] [phone]  [email or web address], Fco Taveras MD, Lab. Director

## 2017-04-24 NOTE — PLAN OF CARE
Problem: Goal Outcome Summary  Goal: Goal Outcome Summary  Outcome: No Change  D: VSS, Afeb. See Epic for full assessment. Pt continues to be A & O X 2-3, Denies pain at present time. Needs UA/UC, has rico, will have PM's obtain.  A/P: Stable at present time, continue routine monitoring.     Shellie Mendiola R.N.

## 2017-04-24 NOTE — PROGRESS NOTES
Nephrology Progress Note  04/24/2017         Assessment & Recommendations:   1. LDKT - baseline Cr ~ 0.6-0.8, but has been running higher 0.9-1.1 range after several recent ROB episodes.  Now presents with ROB, likely due to urosepsis and dehydration.  Kidney function worsening today, she got 600 cc fluids yesterday, her Lasix was stopped. No crackles, some edema, so could be cardio-renal. She is 7 kg down since addmision, with poor po intake, decreased Uout. No weight today. It could have EAP decreased. AIN could be another reason since she was recent started on AB.   -please do a Bladder U/S to r/o obstruction.   -send UA, Up/Ucr.   -cardiac U/s and IVC studies. If she is in HF will diurese, is not will give fluids.   2. Immunosuppression - will continue on tacrolimus and sirolimus and prednisone. Tacrolimus level 6.3. Will make now changes in INS. Goal level is 3-5 for both.  3. HTN/Volume status - fair BP control, above goal of less than 140/90. Still with some dependent edema in the thigh, and slightly elevated BP, but up-trending Cr. Will consider to give Lasix if no volume depleated by IVC.  If need it we can go up on Amlodipine to 10 mg QD is SBP persistent >160s.   4. UTI - improving on antibiotics.  Transplant ID is following her.  5. Anemia in chronic renal disease - stable Hgb, although not checked today.  Recently iron replete.  Will follow.  6. Hypercalcemia - decreased, now normal serum calcium after starting cinacalcet.  Underlying etiology likely due to prolonged immobilization, as well as hyperparathyroidism.  No changes.  7. AMS - appears to wax and wane some, but better this morning.  Will continue to treat high normal/hypercalcemia to rule this out as a cause.  Could consider weaning off Clonidine.  Continue to treat infection.  8. EBV viremia - will recheck EBV PCR.  9. DM - management per primary team.  10. Low KL: poor po intake. Give 40 K Cl. Check Mg.     Recommendations were communicated  "to primary team via this note.    Rochelle Malhotra MD    Interval History:   In the last 24 hours Gem Pastor has been having low Uout with increasing Cr. She got iv fluids yesterday, and her Lasix was hold.  When I saw her there were 400cc/ urine in urinary bag, so unclear is her Uout is recorded accuret. Her Cr is up-trending also. She had a large BM last night, no diarrhea. No fever, her MS at her baseline when I saw her.     Review of Systems:   4 point ROS was obtained and negative, except as noted in Interval History.    Physical Exam:   I/O last 3 completed shifts:  In: 840 [P.O.:240; I.V.:600]  Out: 500 [Urine:500]   /56 (BP Location: Right arm)  Pulse 71  Temp 97.3  F (36.3  C) (Oral)  Resp 18  Ht 1.702 m (5' 7.01\")  Wt 88.9 kg (195 lb 14.4 oz)  SpO2 97%  BMI 30.67 kg/m2     GENERAL APPEARANCE: alert, NAD  HENT: mouth without ulcers or lesions  PULM: lungs clear to auscultation bilaterally, equal air movement  CV: regular rhythm, normal rate     - trace LE edema bilaterally  GI: soft, nontender, obese, bowel sounds are positive  INTEGUMENT: no rash  TX KIDNEY: normal    Labs:   All labs reviewed by me  Electrolytes/Renal -   Recent Labs   Lab Test  04/24/17   0611  04/23/17   0800  04/22/17   0942   02/16/17   2303   09/05/16   0617   NA  139  140  136   < >  131*   < >  135   POTASSIUM  3.2*  3.3*  3.5   < >   --    < >  4.0   CHLORIDE  105  106  103   < >   --    < >  103   CO2  25  23  22   < >   --    < >  21   BUN  26  23  25   < >   --    < >  16   CR  1.62*  1.29*  1.16*   < >   --    < >  0.54   GLC  201*  136*  222*   < >   --    < >  177*   ROGER  8.4*  8.9  9.4   < >   --    < >  8.4*   MAG   --    --   1.7   --   2.0   --   2.2   PHOS  3.3  3.1  2.8   < >  2.9   --    --     < > = values in this interval not displayed.       CBC -   Recent Labs   Lab Test  04/22/17   0942  04/20/17   0714  04/19/17   0005   WBC  5.0  4.0  5.2   HGB  10.5*  9.8*  9.9*   PLT  90*  104*  110* "       LFTs -   Recent Labs   Lab Test  04/22/17   0942  04/19/17   0005  03/22/17   1130   ALKPHOS  100  101  77   BILITOTAL  0.7  0.2  0.1*   ALT  42  44  29   AST  24  24  25   PROTTOTAL  7.9  7.8  7.6   ALBUMIN  3.1*  3.1*  3.0*       Iron Panel -   Recent Labs   Lab Test  02/17/17   2358  09/04/16   0815  08/07/16   0750   IRON  50  124  12*   IRONSAT  29  97*  7*   GIANA  1069*  2676*  645*       Current Medications:    famotidine  20 mg Oral Daily     cinacalcet  30 mg Oral Daily     amLODIPine  5 mg Oral Daily     insulin glargine  12 Units Subcutaneous QAM AC     ampicillin  2 g Intravenous Q6H     tacrolimus  2 mg Oral BID IS     aspirin chewable tablet 81 mg  81 mg Oral Daily     cloNIDine  0.1 mg Oral BID     labetalol  200 mg Oral BID     levothyroxine  50 mcg Oral Daily     predniSONE  2.5 mg Oral Daily     pravastatin  10 mg Oral Daily     sirolimus  1.5 mg Oral Daily     insulin aspart  1-7 Units Subcutaneous TID AC     insulin aspart  1-5 Units Subcutaneous At Bedtime        Rochelle Malhotra MD     Attestation:  This patient has been seen and evaluated by me, Ross Elizabeth MD.  I have reviewed the note and agree with plan of care as documented by the fellow.

## 2017-04-24 NOTE — PROGRESS NOTES
"[facility] Internal Medicine Progress Note     Gem Jade  MRN: [id number]  YOB: 1945          Interval History:     Nursing notes reviewed. Continued to have hallucinations overnight. This morning she was much clearer and alert but was delirious again in the afternoon. She denies any new complaints. Still poor po intake.  Renal function continues to worsen.  BM x 4 yesterday.    Tried to reach  but no answer, left a message.     4 pt ROS negative other than that noted above.          Physical Exam:   Vitals were reviewed  Blood pressure 170/72, pulse 64, temperature 98.4  F (36.9  C), temperature source Oral, resp. rate 18, height 1.702 m (5' 7.01\"), weight 86.1 kg (189 lb 14.4 oz), SpO2 97 %, not currently breastfeeding.    Physical Exam:    General: Alert, NAD, laying in bed.   HEENT: NC, AT, anicteric sclerae, MMM  Neck: Supple  Lungs: CTAB anteriorly, no wheezes  CV: RRR, s1, s2, no m/r/g  Abd: BS+, soft, ND, mild suprapubic tenderness, no HSM   Neuro: awake, alert and orientedx3. 5/5 motor BUE.  CN's grossly intact, no focal deficits         Data:     All labs and imaging reviewed         Assessment and Plan:   Assessment:    Gem Jade is a 72 yo F with a history of kidney transplant (1999), neurogenic bladder with chronic rico, recurrent UTI's that present with AMS, T2DM, hypertension, and hypothyroid who was admitted with altered mental status and UTI, clinically improving but with concerns for worsening delirium and ROB.      # Complicated Urinary tract infection  # Neurogenic bladder w/ chronic rico  History of recurrent UTIs, most recent admission in March presenting with confusion. Previous urine cultures have grown e faecalis, e coli, and klebsiella, most of which were either pan-sensitive or resistant to ampicillin or TMP-SMX.  She did have a UTI with ESBL E. Coli in February 2016.  This admission UA with 155 WBCs. S/p ceftriaxone x 1 in ED and 2 L NS.  Urine " culture growing 2 strains of Enterococcus faecalis both sensitive to ampicillin.  BCx NGTD.  - Ampicillin 2 G IV q 6 H  - Follow urine culture and blood cultures   - Transplant nephrology and Transplant ID consulted to discuss measures that can be taken to prevent recurrent UTI's such as suprapubic catheter placement vs antibiotic ppx. No evidence to support suprapubic catheter placement.  Regarding amoxicillin ppx, we are concerned she would grow multiple drug resistant organisms, and she has a history of C. Diff.      # Toxic metabolic encephalopathy sec to UTI and now appears to be Delirium   Per chart review and discussion with past care providers, she tends to have some confusion after UTI's.  Does not appear to have worsening infection.  No uremia.   - She usually takes gabapentin and Trileptal for chronic pain.  Will hold these sedating medications until MS improves.  No complaints of pain currently.    - Neuro checks q 4 H   - Delirium precautions  - Consider psychiatry evaluation if worsens  - Will avoid starting any medications at this time as she is not a danger to herself or others     # ORB: Likely prerenal in setting poor po intake and lasix. Received LR bolus yesterday. TTE today is normal with normal IVC.   - Repeat UA, urine Eos and urine Pro:Cr    # H/o kidney transplant -   - Tacrolimus 2 mg PO BID.  Level today is wnl.   - Sirolimus 1.5 mg qday  - Prednisone 2.5 mg qday  - Transplant Nephrology consulted, appreciate recs      # Hypercalcemia  - Pt has hypercalcemia when corrected for her low albumin. Received lasix 20 mg IV for this on 4/22 and 4/23. Improved to normal levels. Unlikely it was contributing to her altered behavior   - Cinacalcet 30 mg PO qday      #T2DM  - Glargine at 12 units qday (half dose) as pt not having much PO intake  - Medium dose SSI with hypoglycemia protocol       #Hypertension   - Continue PTA clonidine 0.1 mg BID and labetalol 200 mg BID  - amlodipine 5 mg qday     #  Hypothyroid  - Continue PTA synthroid 50 mcg qday    #HLD - continue home pravastatin and ASA 81 mg qday     FEN/GI - Change diet to regular diet to improve oral intake.   - Zofran PRN   - famotidine 20 mg qday    Prophylaxis: DVT: SCDs.  CPAP per home settings.   Disposition: Pending improvement in AMS and ROB  Code Status: FULL    Pt discussed with Dr Milagros Green MD  Internal Medicine-Pediatrics, PGY4  [phone]

## 2017-04-24 NOTE — PLAN OF CARE
Problem: Goal Outcome Summary  Goal: Goal Outcome Summary  OT/5B:  Completed g/h tasks and dressing with setup and intermittent cues/A due to vision. Provided education and handouts for vision activities -pt very appreciative of this.      Per plan established by the OT, the recommendation for dc location is to home with HC.

## 2017-04-25 ENCOUNTER — APPOINTMENT (OUTPATIENT)
Dept: OCCUPATIONAL THERAPY | Facility: CLINIC | Age: 72
DRG: 698 | End: 2017-04-25
Payer: MEDICARE

## 2017-04-25 LAB
ANION GAP SERPL CALCULATED.3IONS-SCNC: 12 MMOL/L (ref 3–14)
BACTERIA SPEC CULT: NO GROWTH
BUN SERPL-MCNC: 24 MG/DL (ref 7–30)
CALCIUM SERPL-MCNC: 8.3 MG/DL (ref 8.5–10.1)
CHLORIDE SERPL-SCNC: 109 MMOL/L (ref 94–109)
CO2 SERPL-SCNC: 22 MMOL/L (ref 20–32)
CREAT SERPL-MCNC: 1.58 MG/DL (ref 0.52–1.04)
EBV DNA # SPEC NAA+PROBE: ABNORMAL {COPIES}/ML
EBV DNA SPEC NAA+PROBE-LOG#: 4.8 {LOG_COPIES}/ML
GFR SERPL CREATININE-BSD FRML MDRD: 32 ML/MIN/1.7M2
GLUCOSE BLDC GLUCOMTR-MCNC: 134 MG/DL (ref 70–99)
GLUCOSE BLDC GLUCOMTR-MCNC: 135 MG/DL (ref 70–99)
GLUCOSE BLDC GLUCOMTR-MCNC: 142 MG/DL (ref 70–99)
GLUCOSE BLDC GLUCOMTR-MCNC: 152 MG/DL (ref 70–99)
GLUCOSE BLDC GLUCOMTR-MCNC: 171 MG/DL (ref 70–99)
GLUCOSE SERPL-MCNC: 141 MG/DL (ref 70–99)
Lab: NORMAL
MICRO REPORT STATUS: NORMAL
PHOSPHATE SERPL-MCNC: 3.5 MG/DL (ref 2.5–4.5)
POTASSIUM SERPL-SCNC: 3.4 MMOL/L (ref 3.4–5.3)
SODIUM SERPL-SCNC: 143 MMOL/L (ref 133–144)
SPECIMEN SOURCE: NORMAL

## 2017-04-25 PROCEDURE — A9270 NON-COVERED ITEM OR SERVICE: HCPCS | Mod: GY | Performed by: INTERNAL MEDICINE

## 2017-04-25 PROCEDURE — 84165 PROTEIN E-PHORESIS SERUM: CPT | Performed by: INTERNAL MEDICINE

## 2017-04-25 PROCEDURE — 00000402 ZZHCL STATISTIC TOTAL PROTEIN: Performed by: INTERNAL MEDICINE

## 2017-04-25 PROCEDURE — 82784 ASSAY IGA/IGD/IGG/IGM EACH: CPT | Performed by: INTERNAL MEDICINE

## 2017-04-25 PROCEDURE — 25000131 ZZH RX MED GY IP 250 OP 636 PS 637: Mod: GY | Performed by: INTERNAL MEDICINE

## 2017-04-25 PROCEDURE — 25000132 ZZH RX MED GY IP 250 OP 250 PS 637: Mod: GY | Performed by: INTERNAL MEDICINE

## 2017-04-25 PROCEDURE — 84100 ASSAY OF PHOSPHORUS: CPT | Performed by: INTERNAL MEDICINE

## 2017-04-25 PROCEDURE — 80048 BASIC METABOLIC PNL TOTAL CA: CPT | Performed by: INTERNAL MEDICINE

## 2017-04-25 PROCEDURE — 99233 SBSQ HOSP IP/OBS HIGH 50: CPT | Mod: GC | Performed by: INTERNAL MEDICINE

## 2017-04-25 PROCEDURE — 86334 IMMUNOFIX E-PHORESIS SERUM: CPT | Performed by: INTERNAL MEDICINE

## 2017-04-25 PROCEDURE — 83883 ASSAY NEPHELOMETRY NOT SPEC: CPT | Performed by: INTERNAL MEDICINE

## 2017-04-25 PROCEDURE — 97530 THERAPEUTIC ACTIVITIES: CPT | Mod: GO | Performed by: OCCUPATIONAL THERAPIST

## 2017-04-25 PROCEDURE — 00000146 ZZHCL STATISTIC GLUCOSE BY METER IP

## 2017-04-25 PROCEDURE — 12000001 ZZH R&B MED SURG/OB UMMC

## 2017-04-25 PROCEDURE — 40000133 ZZH STATISTIC OT WARD VISIT: Performed by: OCCUPATIONAL THERAPIST

## 2017-04-25 PROCEDURE — 25000125 ZZHC RX 250: Performed by: INTERNAL MEDICINE

## 2017-04-25 PROCEDURE — 36415 COLL VENOUS BLD VENIPUNCTURE: CPT | Performed by: INTERNAL MEDICINE

## 2017-04-25 RX ORDER — AMLODIPINE BESYLATE 10 MG/1
10 TABLET ORAL DAILY
Status: DISCONTINUED | OUTPATIENT
Start: 2017-04-26 | End: 2017-04-28

## 2017-04-25 RX ORDER — CLONIDINE HYDROCHLORIDE 0.1 MG/1
0.05 TABLET ORAL 2 TIMES DAILY
Status: DISCONTINUED | OUTPATIENT
Start: 2017-04-25 | End: 2017-04-26

## 2017-04-25 RX ORDER — POTASSIUM CHLORIDE 750 MG/1
40 TABLET, EXTENDED RELEASE ORAL ONCE
Status: COMPLETED | OUTPATIENT
Start: 2017-04-25 | End: 2017-04-25

## 2017-04-25 RX ADMIN — PRAVASTATIN SODIUM 10 MG: 10 TABLET ORAL at 08:35

## 2017-04-25 RX ADMIN — CINACALCET HYDROCHLORIDE 30 MG: 30 TABLET, COATED ORAL at 08:36

## 2017-04-25 RX ADMIN — ASPIRIN 81 MG: 81 TABLET, CHEWABLE ORAL at 08:35

## 2017-04-25 RX ADMIN — SIROLIMUS 1.5 MG: 1 TABLET, SUGAR COATED ORAL at 08:36

## 2017-04-25 RX ADMIN — LABETALOL HCL 200 MG: 200 TABLET, FILM COATED ORAL at 21:15

## 2017-04-25 RX ADMIN — TACROLIMUS 2 MG: 1 CAPSULE ORAL at 08:35

## 2017-04-25 RX ADMIN — POTASSIUM CHLORIDE 40 MEQ: 750 TABLET, EXTENDED RELEASE ORAL at 13:48

## 2017-04-25 RX ADMIN — AMLODIPINE BESYLATE 5 MG: 5 TABLET ORAL at 08:36

## 2017-04-25 RX ADMIN — FAMOTIDINE 20 MG: 20 TABLET ORAL at 08:36

## 2017-04-25 RX ADMIN — CLONIDINE HYDROCHLORIDE 0.1 MG: 0.1 TABLET ORAL at 08:36

## 2017-04-25 RX ADMIN — PREDNISONE 2.5 MG: 2.5 TABLET ORAL at 08:36

## 2017-04-25 RX ADMIN — LEVOTHYROXINE SODIUM 50 MCG: 50 TABLET ORAL at 08:36

## 2017-04-25 RX ADMIN — TACROLIMUS 1.5 MG: 0.5 CAPSULE ORAL at 18:11

## 2017-04-25 RX ADMIN — Medication 0.05 MG: at 21:15

## 2017-04-25 RX ADMIN — AMPICILLIN SODIUM 2 G: 2 INJECTION, POWDER, FOR SOLUTION INTRAMUSCULAR; INTRAVENOUS at 21:16

## 2017-04-25 RX ADMIN — INSULIN ASPART 1 UNITS: 100 INJECTION, SOLUTION INTRAVENOUS; SUBCUTANEOUS at 11:57

## 2017-04-25 RX ADMIN — AMPICILLIN SODIUM 2 G: 2 INJECTION, POWDER, FOR SOLUTION INTRAMUSCULAR; INTRAVENOUS at 13:48

## 2017-04-25 RX ADMIN — LABETALOL HCL 200 MG: 200 TABLET, FILM COATED ORAL at 08:36

## 2017-04-25 RX ADMIN — INSULIN GLARGINE 12 UNITS: 100 INJECTION, SOLUTION SUBCUTANEOUS at 21:16

## 2017-04-25 RX ADMIN — AMPICILLIN SODIUM 2 G: 2 INJECTION, POWDER, FOR SOLUTION INTRAMUSCULAR; INTRAVENOUS at 05:14

## 2017-04-25 NOTE — PLAN OF CARE
Problem: Goal Outcome Summary  Goal: Goal Outcome Summary  Outcome: Improving  Writer cared for pt from [id number]. AVSS on RA. Denies pain. Pt pleasant and cooperative. Some delirium, but improved from previous days. . Pt up to recliner using ceiling lift. Pt transferred back to bed at 1730; repositioning assistance provided. Pt would like to get back up to recliner once dinner arrives. PIV TKO. Alex patent. BA on for pt safety.     Plan: Continue to monitor and support POC. Possible kidney biopsy tomorrow.

## 2017-04-25 NOTE — PLAN OF CARE
Problem: Goal Outcome Summary  Goal: Goal Outcome Summary  Outcome: No Change  AVSS on RA. C-pap at bedtime. Delirium and visual hallucinations continue.  at bedside at start of evening.  and 166 with SSI. Appetite still poor; ate >25% of dinner. Rico patent with adequate uop. Pt c/o bladder pain this evening, described as 'shocks' to her bladder. Rico clamped for ~5min and urine specimen collected and sent for UC/UA. Some pink-tinged urine noted in catheter (possibly cath rubbing on bladder wall?); clearing up. MD's updated on pt's c/o bladder discomfort; will continue to monitor and wait for lab results. Possible need to replace rico cath if pain persists. Med loose, incontinent BM. Bilateral groins denuded; gauze placed in folds to keep site dry. BA on for pt safety. Repositioned Q2hrs.   Plan: Continue to monitor and support POC.

## 2017-04-25 NOTE — PROGRESS NOTES
Nephrology Progress Note  04/25/2017         Assessment & Recommendations:   1. LDKT - baseline Cr ~ 0.6-0.8, but has been running higher 0.9-1.1 range after several recent ROB episodes.  Now presents with ROB, likely due to urosepsis and dehydration. She was found with nephrotic range proteinuria. She has been having worsening proteinuria on UA since 1/2017. She was started on Sirolimus on 6/2016, so this could be related to Sirolimus. Her serum protein /albumine is high, and she has borderline high Ca corected to her albumine. So there is a question is she has MM. She has been having recurrent UTI, that can give her infectious GN. Giving her age, with high risk of cancer ( I did not find any colonoscopy or mammogram in her chart), membranous GN is another possibility.  -please send: serum and urine electrophoresis and immunofixation, as well as serum kappa/ti light chains.   -send Ualbumine /Ucr ration  -schedule her for kidney Bx by IR tomorrow   - she is not on AC.    -hold ASA  -send INR, PLT tomorrow   2. Immunosuppression - will continue on tacrolimus and sirolimus and prednisone. Tacrolimus level 6.3. Decreased her Tacrolimus to 1.5 and send a level on Friday.  Goal level is 3-5 for both.  3. HTN/Volume status - fair BP control, above goal of less than 140/90. No need for diuresis. Please do strict I@O and daily weight. Give Clonidine 0.05 mg BID  and increase Amlodipine to 10 mg QD.    4. UTI - improving on antibiotics.  Transplant ID is following her.  5. Anemia in chronic renal disease - stable Hgb, although not checked today.  Recently iron replete.  Will follow.  6. Hypercalcemia - decreased, now normal serum calcium after starting cinacalcet.  Underlying etiology likely due to prolonged immobilization, as well as hyperparathyroidism.  No changes.  7. AMS - appears to wax and wane some, but better this morning.  Will continue to treat high normal/hypercalcemia to rule this out as a cause.  Weanign  "off Clonidine.  Continue to treat infection.  8. EBV viremia - will recheck EBV PCR in 2 weeks.   9. DM - management per primary team.  10. Low KL: poor po intake. Give 20 K Cl. Check Mg tomorrow.     Recommendations were communicated to primary team via this note.    Rochelle Malhotra MD    Interval History:   In the last 24 hours Gem Pastor has been having low Uout with increasing Cr. She got iv fluids yesterday, and her Lasix was hold.  When I saw her there were 400cc/ urine in urinary bag, so unclear is her Uout is recorded accuret. Her Cr is up-trending also. She had a large BM last night, no diarrhea. No fever, her MS at her baseline when I saw her.     Review of Systems:   4 point ROS was obtained and negative, except as noted in Interval History.    Physical Exam:   I/O last 3 completed shifts:  In: 300 [P.O.:100; I.V.:200]  Out: 1200 [Urine:1200]   /66 (BP Location: Right arm)  Pulse 73  Temp 96.9  F (36.1  C) (Axillary)  Resp 18  Ht 1.702 m (5' 7.01\")  Wt 86.1 kg (189 lb 14.4 oz)  SpO2 98%  BMI 29.74 kg/m2     GENERAL APPEARANCE: alert, NAD  HENT: mouth without ulcers or lesions  PULM: lungs clear to auscultation bilaterally, equal air movement  CV: regular rhythm, normal rate     - trace LE edema bilaterally  GI: soft, nontender, obese, bowel sounds are positive  INTEGUMENT: no rash  TX KIDNEY: normal    Labs:   All labs reviewed by me  Electrolytes/Renal -   Recent Labs   Lab Test  04/25/17   0618  04/24/17   0611  04/23/17   0800  04/22/17   0942   02/16/17   2303   NA  143  139  140  136   < >  131*   POTASSIUM  3.4  3.2*  3.3*  3.5   < >   --    CHLORIDE  109  105  106  103   < >   --    CO2  22  25  23  22   < >   --    BUN  24  26  23  25   < >   --    CR  1.58*  1.62*  1.29*  1.16*   < >   --    GLC  141*  201*  136*  222*   < >   --    ROGER  8.3*  8.4*  8.9  9.4   < >   --    MAG   --   1.6   --   1.7   --   2.0   PHOS  3.5  3.3  3.1  2.8   < >  2.9    < > = values in this " interval not displayed.       CBC -   Recent Labs   Lab Test  04/22/17   0942  04/20/17   0714  04/19/17   0005   WBC  5.0  4.0  5.2   HGB  10.5*  9.8*  9.9*   PLT  90*  104*  110*       LFTs -   Recent Labs   Lab Test  04/22/17   0942  04/19/17   0005  03/22/17   1130   ALKPHOS  100  101  77   BILITOTAL  0.7  0.2  0.1*   ALT  42  44  29   AST  24  24  25   PROTTOTAL  7.9  7.8  7.6   ALBUMIN  3.1*  3.1*  3.0*       Iron Panel -   Recent Labs   Lab Test  02/17/17   2358  09/04/16   0815  08/07/16   0750   IRON  50  124  12*   IRONSAT  29  97*  7*   GIANA  1069*  2676*  645*       Current Medications:    ampicillin  2 g Intravenous Q8H     famotidine  20 mg Oral Daily     cinacalcet  30 mg Oral Daily     amLODIPine  5 mg Oral Daily     insulin glargine  12 Units Subcutaneous QAM AC     tacrolimus  2 mg Oral BID IS     aspirin chewable tablet 81 mg  81 mg Oral Daily     cloNIDine  0.1 mg Oral BID     labetalol  200 mg Oral BID     levothyroxine  50 mcg Oral Daily     predniSONE  2.5 mg Oral Daily     pravastatin  10 mg Oral Daily     sirolimus  1.5 mg Oral Daily     insulin aspart  1-7 Units Subcutaneous TID AC     insulin aspart  1-5 Units Subcutaneous At Bedtime        Rochelle Malhotra MD     Attestation:  This patient has been seen and evaluated by me, Ross Elizabeth MD.  I have reviewed the note and agree with plan of care as documented by the fellow.

## 2017-04-25 NOTE — PROGRESS NOTES
"[facility] Internal Medicine Progress Note     Gem Jade  MRN: [id number]  YOB: 1945          Interval History:     Nursing notes reviewed. Continued to have some episodes of delirium overnight and was confused early this morning.  Later this morning she was much clearer and alert.  She was able to clearly describe her confusion earlier. She denies any new complaints. PO intake slightly improved. BM x 3 yesterday.     updated via phone.     4 pt ROS negative other than that noted above.          Physical Exam:   Vitals were reviewed  Blood pressure 152/68, pulse 72, temperature 97.8  F (36.6  C), temperature source Oral, resp. rate 16, height 1.702 m (5' 7.01\"), weight 86.1 kg (189 lb 14.4 oz), SpO2 100 %, not currently breastfeeding.    Physical Exam:    General: Alert, NAD, laying in bed, smiling more today  HEENT: NC, AT, anicteric sclerae, MMM  Neck: Supple  Lungs: CTAB anteriorly, no wheezes  CV: RRR, s1, s2, no m/r/g  Abd: BS+, soft, ND, non-tender, no HSM   Neuro: awake, alert and orientedx3. 5/5 motor BUE.  CN's grossly intact, no focal deficits         Data:     All labs and imaging reviewed         Assessment and Plan:   Assessment:    Gem Jade is a 72 yo F with a history of kidney transplant (1999), neurogenic bladder with chronic rico, recurrent UTI's that present with AMS, T2DM, hypertension, and hypothyroid who was admitted with altered mental status and UTI, clinically improving but with concerns for worsening delirium and ROB.      # Complicated Urinary tract infection  # Neurogenic bladder w/ chronic rico  History of recurrent UTIs, most recent admission in March presenting with confusion. Previous urine cultures have grown e faecalis, e coli, and klebsiella, most of which were either pan-sensitive or resistant to ampicillin or TMP-SMX.  She did have a UTI with ESBL E. Coli in February 2016.  This admission UA with 155 WBCs. S/p ceftriaxone x 1 in ED and 2 " L NS.  Urine culture growing 2 strains of Enterococcus faecalis both sensitive to ampicillin.  BCx NGTD.  - Ampicillin 2 G IV q 6 H  - Follow urine culture and blood cultures   - Transplant nephrology and Transplant ID consulted to discuss measures that can be taken to prevent recurrent UTI's such as suprapubic catheter placement vs antibiotic ppx. No evidence to support suprapubic catheter placement.  Regarding amoxicillin ppx, we are concerned she would grow multiple drug resistant organisms, and she has a history of C. Diff.      # Toxic metabolic encephalopathy sec to UTI and now appears to be Delirium   Per chart review and discussion with past care providers, she tends to have some confusion after UTI's.  Does not appear to have worsening infection.  No uremia.   - She usually takes gabapentin and Trileptal for chronic pain.  Will hold these sedating medications until MS improves.  No complaints of pain currently.    - Neuro checks q 4 H   - Delirium precautions  - Consider psychiatry evaluation if worsens  - Will avoid starting any medications at this time as she is not a danger to herself or others     # ROB: Likely prerenal in setting poor po intake and lasix vs intrarenal with nephrotic range proteinuria. Received LR bolus yesterday. TTE today is normal with normal IVC.  No urine eosinophils.  Urine studies show nephrotic range proteinuria.  UA with pyuria and repeat UC pending.   - Check SPEP and UPEP with immunofixation  - Hold ASA in anticipation of possible renal biopsy later this week  - KCl 40 mEq PO today    # H/o kidney transplant -   - Decrease Tacrolimus to 1.5 mg PO BID.    - Sirolimus 1.5 mg qday  - Prednisone 2.5 mg qday  - Transplant Nephrology consulted, appreciate recs      # Hypercalcemia  - Pt has hypercalcemia when corrected for her low albumin. Received lasix 20 mg IV for this on 4/22 and 4/23. Improved to normal levels. Unlikely it was contributing to her altered behavior   -  Cinacalcet 30 mg PO qday      #T2DM  - Glargine at 12 units qday (half dose) as pt not having much PO intake  - Medium dose SSI with hypoglycemia protocol       #Hypertension   - Decrease clonidine 0.05 mg BID   - labetalol 200 mg BID  - Increase amlodipine to 10 mg qday     # Hypothyroid  - Continue PTA synthroid 50 mcg qday    #HLD - continue home pravastatin  - Hold ASA as may have renal biopsy later this week.      FEN/GI - Change diet to regular diet to improve oral intake.   - Zofran PRN   - famotidine 20 mg qday      Prophylaxis: DVT: SCDs.  CPAP per home settings.   Disposition: Pending improvement in AMS and ROB  Code Status: FULL    Pt discussed with Dr Lawanda Green MD  Internal Medicine-Pediatrics, PGY4  [phone]

## 2017-04-25 NOTE — PLAN OF CARE
"Problem: Goal Outcome Summary  Goal: Goal Outcome Summary  OT 5B: Recommend discharge to home with 24hr assist and home PT once medically appropriate.  Pt would benefit from home PT consult to ensure proper fit and condition of home lift equipment/wheelchair, etc.  As well as provide caregiver training to ensure proper use as pt reports it has been several weeks since she has been out of bed despite having equipment present as last time \"it didn't go very well\".  Pt able to acknowledge awareness of waxing/waning cognition; currently quite clear and scored 2/28 on [employer] cognitive screen (0-4/28 = normal).  Pt motivated for OOB activity and tolerates dependent transfer to bedside chair well.  Pt would benefit from getting up to chair with nursing daily.      "

## 2017-04-25 NOTE — PROVIDER NOTIFICATION
MD paged: Pt c/o pain to bladder, and describes as 'shocks'. Rico patent. Some blood-tinged urine noted this evening. Possibly rico rubbing on bladder wall?

## 2017-04-25 NOTE — PLAN OF CARE
Problem: Goal Outcome Summary  Goal: Goal Outcome Summary  Outcome: Improving  Pt is A&O, with intermittent confusion. AVSS on RA. Johnson patent and draining well. Abx infused, then set to TKO. Pt up in chair for several hours. No BM.     Plan: Concern for ROB; renal biopsy tomorrow.     Continue to monitor and follow the POC.

## 2017-04-25 NOTE — PLAN OF CARE
Problem: Goal Outcome Summary  Goal: Goal Outcome Summary  Outcome: No Change  Alert but continues with confusion. VS wnl. BG overnight 135. IV antibiotic administered through right PIV then set to TKO. Repositioning minimized to allow pt to rest per pt care order. CPAP donned and slept throughout the whole night. Johnson catheter intact and draining yellow colored urine. Continue to monitor.

## 2017-04-26 ENCOUNTER — APPOINTMENT (OUTPATIENT)
Dept: OCCUPATIONAL THERAPY | Facility: CLINIC | Age: 72
DRG: 698 | End: 2017-04-26
Payer: MEDICARE

## 2017-04-26 LAB
ALBUMIN SERPL ELPH-MCNC: 3.7 G/DL (ref 3.7–5.1)
ALBUMIN SERPL-MCNC: 3 G/DL (ref 3.4–5)
ALP SERPL-CCNC: 72 U/L (ref 40–150)
ALPHA1 GLOB SERPL ELPH-MCNC: 0.3 G/DL (ref 0.2–0.4)
ALPHA2 GLOB SERPL ELPH-MCNC: 0.8 G/DL (ref 0.5–0.9)
ALT SERPL W P-5'-P-CCNC: 23 U/L (ref 0–50)
ANION GAP SERPL CALCULATED.3IONS-SCNC: 11 MMOL/L (ref 3–14)
AST SERPL W P-5'-P-CCNC: 20 U/L (ref 0–45)
B-GLOBULIN SERPL ELPH-MCNC: 0.7 G/DL (ref 0.6–1)
BASOPHILS # BLD AUTO: 0 10E9/L (ref 0–0.2)
BASOPHILS NFR BLD AUTO: 0.2 %
BILIRUB DIRECT SERPL-MCNC: <0.1 MG/DL (ref 0–0.2)
BILIRUB SERPL-MCNC: 0.6 MG/DL (ref 0.2–1.3)
BUN SERPL-MCNC: 24 MG/DL (ref 7–30)
CALCIUM SERPL-MCNC: 8.2 MG/DL (ref 8.5–10.1)
CHLORIDE SERPL-SCNC: 106 MMOL/L (ref 94–109)
CO2 SERPL-SCNC: 22 MMOL/L (ref 20–32)
CREAT SERPL-MCNC: 1.71 MG/DL (ref 0.52–1.04)
CREAT UR-MCNC: 27 MG/DL
CREAT UR-MCNC: 27 MG/DL
DIFFERENTIAL METHOD BLD: ABNORMAL
EOSINOPHIL # BLD AUTO: 0.2 10E9/L (ref 0–0.7)
EOSINOPHIL NFR BLD AUTO: 2.9 %
ERYTHROCYTE [DISTWIDTH] IN BLOOD BY AUTOMATED COUNT: 15 % (ref 10–15)
GAMMA GLOB SERPL ELPH-MCNC: 1.7 G/DL (ref 0.7–1.6)
GFR SERPL CREATININE-BSD FRML MDRD: 29 ML/MIN/1.7M2
GLUCOSE BLDC GLUCOMTR-MCNC: 114 MG/DL (ref 70–99)
GLUCOSE BLDC GLUCOMTR-MCNC: 138 MG/DL (ref 70–99)
GLUCOSE BLDC GLUCOMTR-MCNC: 144 MG/DL (ref 70–99)
GLUCOSE BLDC GLUCOMTR-MCNC: 225 MG/DL (ref 70–99)
GLUCOSE SERPL-MCNC: 123 MG/DL (ref 70–99)
HCT VFR BLD AUTO: 31.4 % (ref 35–47)
HGB BLD-MCNC: 9.7 G/DL (ref 11.7–15.7)
IGA SERPL-MCNC: 351 MG/DL (ref 70–380)
IGG SERPL-MCNC: 1530 MG/DL (ref 695–1620)
IGM SERPL-MCNC: 163 MG/DL (ref 60–265)
IMM GRANULOCYTES # BLD: 0 10E9/L (ref 0–0.4)
IMM GRANULOCYTES NFR BLD: 0.2 %
IMMUNOFIXATION ELP: NORMAL
INR PPP: 1.06 (ref 0.86–1.14)
KAPPA LC UR-MCNC: 7.23 MG/DL (ref 0.33–1.94)
KAPPA LC/LAMBDA SER: 1.01 {RATIO} (ref 0.26–1.65)
LAMBDA LC SERPL-MCNC: 7.17 MG/DL (ref 0.57–2.63)
LYMPHOCYTES # BLD AUTO: 1.7 10E9/L (ref 0.8–5.3)
LYMPHOCYTES NFR BLD AUTO: 32.9 %
M PROTEIN SERPL ELPH-MCNC: 0.1 G/DL
MCH RBC QN AUTO: 28.4 PG (ref 26.5–33)
MCHC RBC AUTO-ENTMCNC: 30.9 G/DL (ref 31.5–36.5)
MCV RBC AUTO: 92 FL (ref 78–100)
MICROALBUMIN UR-MCNC: 1850 MG/L
MICROALBUMIN/CREAT UR: 6776.56 MG/G CR (ref 0–25)
MONOCYTES # BLD AUTO: 0.4 10E9/L (ref 0–1.3)
MONOCYTES NFR BLD AUTO: 7.4 %
NEUTROPHILS # BLD AUTO: 2.9 10E9/L (ref 1.6–8.3)
NEUTROPHILS NFR BLD AUTO: 56.4 %
NRBC # BLD AUTO: 0 10*3/UL
NRBC BLD AUTO-RTO: 0 /100
PHOSPHATE SERPL-MCNC: 3.1 MG/DL (ref 2.5–4.5)
PLATELET # BLD AUTO: 91 10E9/L (ref 150–450)
POTASSIUM SERPL-SCNC: 3.8 MMOL/L (ref 3.4–5.3)
PROT PATTERN SERPL ELPH-IMP: ABNORMAL
PROT SERPL-MCNC: 7.3 G/DL (ref 6.8–8.8)
RBC # BLD AUTO: 3.41 10E12/L (ref 3.8–5.2)
SODIUM SERPL-SCNC: 138 MMOL/L (ref 133–144)
WBC # BLD AUTO: 5.1 10E9/L (ref 4–11)

## 2017-04-26 PROCEDURE — 97535 SELF CARE MNGMENT TRAINING: CPT | Mod: GO

## 2017-04-26 PROCEDURE — 85025 COMPLETE CBC W/AUTO DIFF WBC: CPT | Performed by: RADIOLOGY PRACTITIONER ASSISTANT

## 2017-04-26 PROCEDURE — 25000132 ZZH RX MED GY IP 250 OP 250 PS 637: Mod: GY | Performed by: INTERNAL MEDICINE

## 2017-04-26 PROCEDURE — A9270 NON-COVERED ITEM OR SERVICE: HCPCS | Mod: GY | Performed by: INTERNAL MEDICINE

## 2017-04-26 PROCEDURE — 36415 COLL VENOUS BLD VENIPUNCTURE: CPT | Performed by: INTERNAL MEDICINE

## 2017-04-26 PROCEDURE — 86335 IMMUNFIX E-PHORSIS/URINE/CSF: CPT | Performed by: INTERNAL MEDICINE

## 2017-04-26 PROCEDURE — 12000001 ZZH R&B MED SURG/OB UMMC

## 2017-04-26 PROCEDURE — 36415 COLL VENOUS BLD VENIPUNCTURE: CPT | Performed by: RADIOLOGY PRACTITIONER ASSISTANT

## 2017-04-26 PROCEDURE — 85610 PROTHROMBIN TIME: CPT | Performed by: RADIOLOGY PRACTITIONER ASSISTANT

## 2017-04-26 PROCEDURE — 83883 ASSAY NEPHELOMETRY NOT SPEC: CPT | Performed by: INTERNAL MEDICINE

## 2017-04-26 PROCEDURE — 86335 IMMUNFIX E-PHORSIS/URINE/CSF: CPT | Performed by: HOSPITALIST

## 2017-04-26 PROCEDURE — 00000146 ZZHCL STATISTIC GLUCOSE BY METER IP

## 2017-04-26 PROCEDURE — 80076 HEPATIC FUNCTION PANEL: CPT | Performed by: INTERNAL MEDICINE

## 2017-04-26 PROCEDURE — 84100 ASSAY OF PHOSPHORUS: CPT | Performed by: INTERNAL MEDICINE

## 2017-04-26 PROCEDURE — 84156 ASSAY OF PROTEIN URINE: CPT | Performed by: INTERNAL MEDICINE

## 2017-04-26 PROCEDURE — 40000133 ZZH STATISTIC OT WARD VISIT

## 2017-04-26 PROCEDURE — 84166 PROTEIN E-PHORESIS/URINE/CSF: CPT | Performed by: HOSPITALIST

## 2017-04-26 PROCEDURE — 85610 PROTHROMBIN TIME: CPT | Performed by: INTERNAL MEDICINE

## 2017-04-26 PROCEDURE — 99233 SBSQ HOSP IP/OBS HIGH 50: CPT | Mod: GC | Performed by: INTERNAL MEDICINE

## 2017-04-26 PROCEDURE — 25000131 ZZH RX MED GY IP 250 OP 636 PS 637: Mod: GY | Performed by: INTERNAL MEDICINE

## 2017-04-26 PROCEDURE — 85025 COMPLETE CBC W/AUTO DIFF WBC: CPT | Performed by: INTERNAL MEDICINE

## 2017-04-26 PROCEDURE — 25000125 ZZHC RX 250: Performed by: INTERNAL MEDICINE

## 2017-04-26 PROCEDURE — 80048 BASIC METABOLIC PNL TOTAL CA: CPT | Performed by: INTERNAL MEDICINE

## 2017-04-26 RX ORDER — CLONIDINE HYDROCHLORIDE 0.1 MG/1
0.1 TABLET ORAL 2 TIMES DAILY
Status: DISCONTINUED | OUTPATIENT
Start: 2017-04-26 | End: 2017-04-27

## 2017-04-26 RX ADMIN — AMPICILLIN SODIUM 2 G: 2 INJECTION, POWDER, FOR SOLUTION INTRAMUSCULAR; INTRAVENOUS at 15:09

## 2017-04-26 RX ADMIN — PREDNISONE 2.5 MG: 2.5 TABLET ORAL at 08:11

## 2017-04-26 RX ADMIN — CLONIDINE HYDROCHLORIDE 0.1 MG: 0.1 TABLET ORAL at 21:42

## 2017-04-26 RX ADMIN — LEVOTHYROXINE SODIUM 50 MCG: 50 TABLET ORAL at 08:09

## 2017-04-26 RX ADMIN — Medication 0.05 MG: at 08:10

## 2017-04-26 RX ADMIN — PRAVASTATIN SODIUM 10 MG: 10 TABLET ORAL at 08:11

## 2017-04-26 RX ADMIN — INSULIN GLARGINE 12 UNITS: 100 INJECTION, SOLUTION SUBCUTANEOUS at 21:44

## 2017-04-26 RX ADMIN — LABETALOL HCL 200 MG: 200 TABLET, FILM COATED ORAL at 21:43

## 2017-04-26 RX ADMIN — CINACALCET HYDROCHLORIDE 30 MG: 30 TABLET, COATED ORAL at 08:09

## 2017-04-26 RX ADMIN — AMPICILLIN SODIUM 2 G: 2 INJECTION, POWDER, FOR SOLUTION INTRAMUSCULAR; INTRAVENOUS at 21:43

## 2017-04-26 RX ADMIN — AMPICILLIN SODIUM 2 G: 2 INJECTION, POWDER, FOR SOLUTION INTRAMUSCULAR; INTRAVENOUS at 05:21

## 2017-04-26 RX ADMIN — LABETALOL HCL 200 MG: 200 TABLET, FILM COATED ORAL at 08:11

## 2017-04-26 RX ADMIN — AMLODIPINE BESYLATE 10 MG: 10 TABLET ORAL at 08:11

## 2017-04-26 RX ADMIN — TACROLIMUS 1.5 MG: 0.5 CAPSULE ORAL at 17:58

## 2017-04-26 RX ADMIN — TACROLIMUS 1.5 MG: 0.5 CAPSULE ORAL at 08:10

## 2017-04-26 RX ADMIN — FAMOTIDINE 20 MG: 20 TABLET ORAL at 08:09

## 2017-04-26 RX ADMIN — INSULIN ASPART 2 UNITS: 100 INJECTION, SOLUTION INTRAVENOUS; SUBCUTANEOUS at 17:57

## 2017-04-26 RX ADMIN — SIROLIMUS 1.5 MG: 1 TABLET, SUGAR COATED ORAL at 08:10

## 2017-04-26 NOTE — PROGRESS NOTES
Nephrology Progress Note  04/26/2017         Assessment & Recommendations:   1. LDKT - baseline Cr ~ 0.6-0.8, but has been running higher 0.9-1.1 range after several recent ROB episodes.  Now presents with RBO, likely due to urosepsis and dehydration. She was found with nephrotic range proteinuria. She has been having worsening proteinuria on UA since 1/2017. She was started on Sirolimus on 6/2016, so this could be related to Sirolimus. Her serum protein /albumine is high, and she has borderline high Ca corected to her albumine. So there is a question is she has MM. She has been having recurrent UTI, that can give her infectious GN. Giving her age, with high risk of cancer ( I did not find any colonoscopy or mammogram in her chart), membranous GN is another possibility.  -please send: serum and urine electrophoresis and immunofixation, as well as serum kappa/ti light chains.   -send Ualbumine /Ucr ration  -schedule her for kidney Bx by IR tomorrow   - she is not on AC.    -hold ASA  -INR, PLT ok for Bx   -send DSA (order in)  2. Immunosuppression - will continue on tacrolimus and sirolimus and prednisone. Tacrolimus level 6.3. Decreased her Tacrolimus to 1.5 and send a level on Friday.  Goal level is 3-5 for both.  3. HTN/Volume status - fair BP control, above goal of less than 140/90. No need for diuresis. Please do strict I@O and daily weight. Give Clonidine 0.05 mg BID  and increase Labetalol to 300 mg BID. C/w Amlodipine 5 mg QD.     4. UTI - improving on antibiotics. Uc from 4/24/17 negative to date.   5. Anemia in chronic renal disease - stable Hgb, although not checked today.  Recently iron replete.  Will follow.  6. Hypercalcemia - decreased, now normal serum calcium after starting cinacalcet.  Underlying etiology likely due to prolonged immobilization, as well as hyperparathyroidism.  No changes.  7. AMS - back to her baseline.  Will continue to treat high normal/hypercalcemia to rule this out as a  "cause.  Weanign off Clonidine.  Continue to treat infection.  8. EBV viremia - will recheck EBV PCR in 2 weeks.   9. DM - management per primary team.  10. Low KL: poor po intake.  Check Mg tomorrow.     Recommendations were communicated to primary team via this note.    Rochelle Malhotra MD    Interval History:   In the last 24 hours Gem Pastor has been having low Uout, poor po intake, but as per nurse with good fluid intake. She is having still low Uout, Cr up trending.     Review of Systems:   4 point ROS was obtained and negative, except as noted in Interval History.    Physical Exam:   I/O last 3 completed shifts:  In: -   Out: 1300 [Urine:1300]   /79 (BP Location: Left arm)  Pulse 72  Temp 98  F (36.7  C) (Oral)  Resp 16  Ht 1.702 m (5' 7.01\")  Wt 88.5 kg (195 lb)  SpO2 98%  BMI 30.53 kg/m2     GENERAL APPEARANCE: alert, NAD  HENT: mouth without ulcers or lesions  PULM: lungs clear to auscultation bilaterally, equal air movement  CV: regular rhythm, normal rate     - no LE edema bilaterally  GI: soft, nontender, obese, bowel sounds are positive  INTEGUMENT: no rash  TX KIDNEY: normal    Labs:   All labs reviewed by me  Electrolytes/Renal -   Recent Labs   Lab Test  04/26/17   0638  04/25/17   0618  04/24/17   0611   04/22/17   0942   02/16/17   2303   NA  138  143  139   < >  136   < >  131*   POTASSIUM  3.8  3.4  3.2*   < >  3.5   < >   --    CHLORIDE  106  109  105   < >  103   < >   --    CO2  22  22  25   < >  22   < >   --    BUN  24  24  26   < >  25   < >   --    CR  1.71*  1.58*  1.62*   < >  1.16*   < >   --    GLC  123*  141*  201*   < >  222*   < >   --    ROGER  8.2*  8.3*  8.4*   < >  9.4   < >   --    MAG   --    --   1.6   --   1.7   --   2.0   PHOS  3.1  3.5  3.3   < >  2.8   < >  2.9    < > = values in this interval not displayed.       CBC -   Recent Labs   Lab Test  04/26/17   0638  04/22/17   0942  04/20/17   0714   WBC  5.1  5.0  4.0   HGB  9.7*  10.5*  9.8*   PLT  91*  " 90*  104*       LFTs -   Recent Labs   Lab Test  04/26/17   0638  04/22/17   0942  04/19/17   0005   ALKPHOS  72  100  101   BILITOTAL  0.6  0.7  0.2   ALT  23  42  44   AST  20  24  24   PROTTOTAL  7.3  7.9  7.8   ALBUMIN  3.0*  3.1*  3.1*       Iron Panel -   Recent Labs   Lab Test  02/17/17   2358  09/04/16   0815  08/07/16   0750   IRON  50  124  12*   IRONSAT  29  97*  7*   GIANA  1069*  2676*  645*       Current Medications:    cloNIDine  0.1 mg Oral BID     tacrolimus  1.5 mg Oral BID IS     amLODIPine  10 mg Oral Daily     ampicillin  2 g Intravenous Q8H     famotidine  20 mg Oral Daily     cinacalcet  30 mg Oral Daily     insulin glargine  12 Units Subcutaneous QAM AC     labetalol  200 mg Oral BID     levothyroxine  50 mcg Oral Daily     predniSONE  2.5 mg Oral Daily     pravastatin  10 mg Oral Daily     sirolimus  1.5 mg Oral Daily     insulin aspart  1-7 Units Subcutaneous TID AC     insulin aspart  1-5 Units Subcutaneous At Bedtime        Rochelle Malhotra MD     Attestation:  This patient has been seen and evaluated by me, Ross Elizabeth MD.  I have reviewed the note and agree with plan of care as documented by the fellow.

## 2017-04-26 NOTE — PLAN OF CARE
Problem: Goal Outcome Summary  Goal: Goal Outcome Summary  Outcome: No Change  S: UTI  B: DM2, HTN, hypothyroid, kidney tx '99  A: VSS. Denies pain. Johnson. A&O. Delirium. Mechanical lift. 2 assist.  R: Kidney biopsy tomorrow. Will continue to monitor.

## 2017-04-26 NOTE — PLAN OF CARE
Problem: Goal Outcome Summary  Goal: Goal Outcome Summary  Outcome: Improving  Pt is A&O. AVSS on RA. Johnson patent and draining well. Abx infused, then set to TKO. Pt family present at bedside. Biopsy postponed until tomorrow. BG has been 114 and 138; no insulin given this shift.     Plan: Concern for ROB; renal biopsy tomorrow.      Continue to monitor and follow the POC.

## 2017-04-26 NOTE — CONSULTS
Patient is on IR schedule 4/27/17 for a US guided random right lower quadrant  Transplanted renal  Biopsy.    Labs WNL for procedure.    Orders for NPO, scrubs have been entered  Consent will be done prior to procedure.   Please contact the IR [place] RN at [phone] for estimated time of procedure.          Armida Lopez IR RPA  [phone] Call pager  [phone] pager

## 2017-04-26 NOTE — PROGRESS NOTES
"[facility] Internal Medicine Progress Note     Gem Jade  MRN: [id number]  YOB: 1945          Interval History:     Nursing notes reviewed. Pt did well overnight without any hallucinations.  However, she was unable to sleep. Remained afebrile with hypertension. Stable on room air.  Today she is in good spirits, but tired as did not sleep last night.   Denies ny new complains.  BM x 3.  Only total of 650 ml of UOP yesterday but 950 ml charted after midnight.       4 pt ROS negative other than that noted above.          Physical Exam:   Vitals were reviewed  Blood pressure 166/59, pulse 72, temperature 98.1  F (36.7  C), temperature source Oral, resp. rate 16, height 1.702 m (5' 7.01\"), weight 88.5 kg (195 lb), SpO2 97 %, not currently breastfeeding.    Physical Exam:    General: Alert, NAD, laying in bed, smiling more today  HEENT: NC, AT, anicteric sclerae, MMM  Neck: Supple  Lungs: CTAB anteriorly, no wheezes  CV: RRR, s1, s2, no m/r/g  Abd: BS+, soft, ND, non-tender, no HSM   Neuro: awake, alert and oriented.  CN's grossly intact, no focal deficits         Data:     All labs and imaging reviewed         Assessment and Plan:   Assessment:    Gem Jade is a 70 yo F with a history of kidney transplant (1999), neurogenic bladder with chronic rico, recurrent UTI's that present with AMS, T2DM, hypertension, and hypothyroid who was admitted with altered mental status and UTI, clinically improving but with concerns for worsening ROB with nephrotic range proteinuria and delirium. .      # Complicated Urinary tract infection  # Neurogenic bladder w/ chronic rico  History of recurrent UTIs, most recent admission in March presenting with confusion. Previous urine cultures have grown e faecalis, e coli, and klebsiella, most of which were either pan-sensitive or resistant to ampicillin or TMP-SMX.  She did have a UTI with ESBL E. Coli in February 2016.  This admission UA with 155 WBCs. S/p " ceftriaxone x 1 in ED and 2 L NS.  Urine culture growing 2 strains of Enterococcus faecalis both sensitive to ampicillin.  BCx NGTD.  - Ampicillin 2 G IV q 6 H  - Follow urine culture and blood cultures   - Transplant nephrology and Transplant ID consulted to discuss measures that can be taken to prevent recurrent UTI's such as suprapubic catheter placement vs antibiotic ppx. No evidence to support suprapubic catheter placement.  Regarding amoxicillin ppx, we are concerned she would grow multiple drug resistant organisms, and she has a history of C. Diff.      # Toxic metabolic encephalopathy sec to UTI and now appears to be Delirium   Per chart review and discussion with past care providers, she tends to have some confusion after UTI's.  Does not appear to have worsening infection.  No uremia.   - She usually takes gabapentin and Trileptal for chronic pain.  Will hold these sedating medications until MS improves.  No complaints of pain currently.    - Neuro checks q 4 H   - Delirium precautions  - Consider psychiatry evaluation if worsens  - Will avoid starting any medications at this time as she is not a danger to herself or others     # ROB: Likely intrarenal with nephrotic range proteinuria. Received LR bolus yesterday. TTE today is normal with normal IVC.  No urine eosinophils.  Urine studies show nephrotic range proteinuria.  UA with pyuria and repeat UC pending.  SPEP with very small monoclonal peak.    - UPEP with immunofixation pending  - Urine albumin:Cr pending  - Renal biopsy tomorrow by IR      # H/o kidney transplant -   - Decreased Tacrolimus to 1.5 mg PO BID.  Recheck level on 4/28/17 if still inpatient  - Sirolimus 1.5 mg qday  - Prednisone 2.5 mg qday  - Transplant Nephrology consulted, appreciate recs      # Hypercalcemia -Resolved  - Pt had hypercalcemia when corrected for her low albumin. Received lasix 20 mg IV for this on 4/22 and 4/23. Improved to normal levels. Unlikely it was contributing  to her altered behavior   - Cinacalcet 30 mg PO qday      #T2DM  - Glargine at 12 units qday (half dose) as pt not having much PO intake  - Medium dose SSI with hypoglycemia protocol       #Hypertension   - Clonidine 0.1 mg BID   - labetalol 200 mg BID  - Amlodipine to 10 mg qday     # Hypothyroid  - Continue PTA synthroid 50 mcg qday    #HLD - continue home pravastatin  - Hold ASA for renal biopsy tomorrow     FEN/GI - Change diet to regular diet to improve oral intake.   - Zofran PRN   - famotidine 20 mg qday      Prophylaxis: DVT: SCDs.  CPAP per home settings.   Disposition: Pending improvement in delirium and ROB  Code Status: FULL    Pt discussed with Dr Lawanda Green MD  Internal Medicine-Pediatrics, PGY4  [phone]

## 2017-04-26 NOTE — PLAN OF CARE
Problem: Goal Outcome Summary  Goal: Goal Outcome Summary  OT/5B: Completed g/h tasks with use of tray table and intermittent cues and min A due to vision.  Provided more handouts for vision activities -per pt request.         Per plan established by the OT, the recommendation for dc location is  home with 24hr assist and home PT once medically appropriate.

## 2017-04-26 NOTE — PROGRESS NOTES
"CLINICAL NUTRITION SERVICES - ASSESSMENT NOTE     Nutrition Prescription    RECOMMENDATIONS FOR MDs/PROVIDERS TO ORDER:  Regular diet as tolerated     Malnutrition Status:    Non-severe malnutrition in the context of acute illness    Recommendations already ordered by Registered Dietitian (RD):  Ordered Glucerna BID to help improve intake    Future/Additional Recommendations:  None        REASON FOR ASSESSMENT  Gem Jade is a/an 71 year old female assessed by the dietitian for LOS x7 days     Chart reviewed:  Pt with PMH of kidney transplant (1999), neurogenic bladder with chronic rico, recurrent UTI's that present with AMS, T2DM, hypertension, and hypothyroid who was admitted with altered mental status and UTI, clinically improving but with concerns for worsening delirium and ROB.        NUTRITION HISTORY  Pt notes a fairly good po intake. PO varies day to day. Takes boost supplements at home to augment PO x 1 per day.     CURRENT NUTRITION ORDERS  Diet: Regular  Intake/Tolerance: Per patient, skips bkf. Usually eats lunch and dinner. Did not order any lunch today since family is bringing food from home. Pt reports enjoying room service meals and orders chicken stir gooden over rice almost every day. Pt also likes banana bread and orders them daily.     Per RN/flow sheet review, pt is consuming 0-25% of meals  Pt would like to receive Oral nutrition supplements in between meals x 2 per day      LABS  Labs reviewed    MEDICATIONS  Medications reviewed    ANTHROPOMETRICS  Height: 170.2 cm (5' 7.008\")  Most Recent Weight: 88.5 kg (195 lb) on 4/25 , ( admission wt on 4/19 was 93.4 kg (205 lb 12.8 oz)).   IBW: 61.4 kg ( 144% IBW)   BMI: 30.53 kg /m2 - Obesity Grade I BMI 30-34.9  Weight History:   Wt Readings from Last 10 Encounters:   04/25/17 88.5 kg (195 lb)   03/15/17 91.2 kg (201 lb)   02/20/17 93.1 kg (205 lb 4.8 oz)   09/03/16 91.4 kg (201 lb 8 oz)   08/09/16 90.7 kg (200 lb)   06/22/16 89.8 kg (198 lb) "   05/30/16 91 kg (200 lb 9.9 oz)   01/13/16 101.9 kg (224 lb 10.4 oz)   12/20/15 104.9 kg (231 lb 4.2 oz)   08/31/15 101.2 kg (223 lb 2.4 oz)       Dosing Weight: 68 kg adjusted wt from most recent wt / lowest wt this admission on 4/25 and IBW of 61.4 kg     ASSESSED NUTRITION NEEDS  Estimated Energy Needs: 1360 - 1700 kcals/day (20 - 25 kcals/kg)  Justification: Modest needs for Maintenance  Estimated Protein Needs: 68 - 82  grams protein/day (1 - 1.2 grams of pro/kg)  Justification: Maintenance pending renal function   Estimated Fluid Needs: (1 mL/kcal)   Justification: Maintenance    PHYSICAL FINDINGS  See malnutrition section below.      MALNUTRITION  % Intake: Likely inadequate < 75% > 7 days   % Weight Loss: > 2% in 1 week (severe)  Subcutaneous Fat Loss: None observed  Muscle Loss: Mild upper arms   Fluid Accumulation/Edema: trace LE edema bilaterally  Malnutrition Diagnosis: Non-severe malnutrition in the context of acute illness    NUTRITION DIAGNOSIS  inadequate nutrient intake related to low appetite and initially AMS as evidence by RN report of 0-25% meal intake      INTERVENTIONS  Implementation  Nutrition education for nutrition relationship to health/disease   Medical food supplement therapy - Ordered Glucerna BID     Goals  Patient to consume % of nutritionally adequate meal trays TID, or the equivalent with supplements/snacks.     Monitoring/Evaluation  Progress toward goals will be monitored and evaluated per protocol.    Aaorn Stewart RD/ELLEN  Pager [phone]

## 2017-04-26 NOTE — PLAN OF CARE
Problem: Goal Outcome Summary  Goal: Goal Outcome Summary  Outcome: No Change  Pt alert and appears more oriented during the night with a bit of confusion. Was able to identify staff, situation, but having difficulty with time. Vs wnl. Continues with two assists with cares. Johnson intact and draining. Had medium BM overnight, loose. . Scheduled antibiotics administered through PIV and then set to TKO. Awake though out the whole night. BA on. Continue to monitor.

## 2017-04-27 ENCOUNTER — APPOINTMENT (OUTPATIENT)
Dept: INTERVENTIONAL RADIOLOGY/VASCULAR | Facility: CLINIC | Age: 72
DRG: 698 | End: 2017-04-27
Attending: RADIOLOGY PRACTITIONER ASSISTANT
Payer: MEDICARE

## 2017-04-27 ENCOUNTER — APPOINTMENT (OUTPATIENT)
Dept: OCCUPATIONAL THERAPY | Facility: CLINIC | Age: 72
DRG: 698 | End: 2017-04-27
Payer: MEDICARE

## 2017-04-27 LAB
ALBUMIN MFR UR ELPH: 67.5 %
ALBUMIN UR-MCNC: 300 MG/DL
ALPHA1 GLOB MFR UR ELPH: 4.4 %
ALPHA2 GLOB MFR UR ELPH: 5.5 %
AMORPH CRY #/AREA URNS HPF: ABNORMAL /HPF
ANION GAP SERPL CALCULATED.3IONS-SCNC: 7 MMOL/L (ref 3–14)
APPEARANCE UR: CLEAR
B-GLOBULIN MFR UR ELPH: 4.5 %
BASOPHILS # BLD AUTO: 0 10E9/L (ref 0–0.2)
BASOPHILS # BLD AUTO: 0 10E9/L (ref 0–0.2)
BASOPHILS NFR BLD AUTO: 0.2 %
BASOPHILS NFR BLD AUTO: 0.4 %
BILIRUB UR QL STRIP: NEGATIVE
BUN SERPL-MCNC: 21 MG/DL (ref 7–30)
CALCIUM SERPL-MCNC: 7.9 MG/DL (ref 8.5–10.1)
CHLORIDE SERPL-SCNC: 107 MMOL/L (ref 94–109)
CO2 SERPL-SCNC: 24 MMOL/L (ref 20–32)
COLOR UR AUTO: ABNORMAL
CREAT SERPL-MCNC: 1.75 MG/DL (ref 0.52–1.04)
DIFFERENTIAL METHOD BLD: ABNORMAL
DIFFERENTIAL METHOD BLD: ABNORMAL
EOSINOPHIL # BLD AUTO: 0.1 10E9/L (ref 0–0.7)
EOSINOPHIL # BLD AUTO: 0.2 10E9/L (ref 0–0.7)
EOSINOPHIL NFR BLD AUTO: 2.6 %
EOSINOPHIL NFR BLD AUTO: 3 %
ERYTHROCYTE [DISTWIDTH] IN BLOOD BY AUTOMATED COUNT: 14.8 % (ref 10–15)
ERYTHROCYTE [DISTWIDTH] IN BLOOD BY AUTOMATED COUNT: 14.8 % (ref 10–15)
GAMMA GLOB MFR UR ELPH: 18.1 %
GFR SERPL CREATININE-BSD FRML MDRD: 29 ML/MIN/1.7M2
GLUCOSE BLDC GLUCOMTR-MCNC: 153 MG/DL (ref 70–99)
GLUCOSE BLDC GLUCOMTR-MCNC: 174 MG/DL (ref 70–99)
GLUCOSE BLDC GLUCOMTR-MCNC: 205 MG/DL (ref 70–99)
GLUCOSE BLDC GLUCOMTR-MCNC: 209 MG/DL (ref 70–99)
GLUCOSE BLDC GLUCOMTR-MCNC: 234 MG/DL (ref 70–99)
GLUCOSE BLDC GLUCOMTR-MCNC: 244 MG/DL (ref 70–99)
GLUCOSE SERPL-MCNC: 212 MG/DL (ref 70–99)
GLUCOSE UR STRIP-MCNC: 300 MG/DL
HCT VFR BLD AUTO: 30.9 % (ref 35–47)
HCT VFR BLD AUTO: 31.4 % (ref 35–47)
HGB BLD-MCNC: 10 G/DL (ref 11.7–15.7)
HGB BLD-MCNC: 9.5 G/DL (ref 11.7–15.7)
HGB BLD-MCNC: 9.6 G/DL (ref 11.7–15.7)
HGB UR QL STRIP: ABNORMAL
IMM GRANULOCYTES # BLD: 0 10E9/L (ref 0–0.4)
IMM GRANULOCYTES # BLD: 0 10E9/L (ref 0–0.4)
IMM GRANULOCYTES NFR BLD: 0 %
IMM GRANULOCYTES NFR BLD: 0.4 %
IMMUNOFIX ELP, URINE: NORMAL
INR PPP: 1.03 (ref 0.86–1.14)
KETONES UR STRIP-MCNC: NEGATIVE MG/DL
LEUKOCYTE ESTERASE UR QL STRIP: ABNORMAL
LYMPHOCYTES # BLD AUTO: 1.4 10E9/L (ref 0.8–5.3)
LYMPHOCYTES # BLD AUTO: 1.5 10E9/L (ref 0.8–5.3)
LYMPHOCYTES NFR BLD AUTO: 28.1 %
LYMPHOCYTES NFR BLD AUTO: 28.8 %
M PROTEIN MFR UR ELPH: 2.2 %
MAGNESIUM SERPL-MCNC: 1.6 MG/DL (ref 1.6–2.3)
MCH RBC QN AUTO: 28.2 PG (ref 26.5–33)
MCH RBC QN AUTO: 28.4 PG (ref 26.5–33)
MCHC RBC AUTO-ENTMCNC: 30.6 G/DL (ref 31.5–36.5)
MCHC RBC AUTO-ENTMCNC: 30.7 G/DL (ref 31.5–36.5)
MCV RBC AUTO: 92 FL (ref 78–100)
MCV RBC AUTO: 92 FL (ref 78–100)
MONOCYTES # BLD AUTO: 0.3 10E9/L (ref 0–1.3)
MONOCYTES # BLD AUTO: 0.4 10E9/L (ref 0–1.3)
MONOCYTES NFR BLD AUTO: 6.3 %
MONOCYTES NFR BLD AUTO: 7.1 %
MUCOUS THREADS #/AREA URNS LPF: PRESENT /LPF
NEUTROPHILS # BLD AUTO: 3.1 10E9/L (ref 1.6–8.3)
NEUTROPHILS # BLD AUTO: 3.1 10E9/L (ref 1.6–8.3)
NEUTROPHILS NFR BLD AUTO: 60.7 %
NEUTROPHILS NFR BLD AUTO: 62.4 %
NITRATE UR QL: NEGATIVE
NRBC # BLD AUTO: 0 10*3/UL
NRBC # BLD AUTO: 0 10*3/UL
NRBC BLD AUTO-RTO: 0 /100
NRBC BLD AUTO-RTO: 0 /100
PH UR STRIP: 7 PH (ref 5–7)
PHOSPHATE SERPL-MCNC: 2.8 MG/DL (ref 2.5–4.5)
PLATELET # BLD AUTO: 105 10E9/L (ref 150–450)
PLATELET # BLD AUTO: 105 10E9/L (ref 150–450)
POTASSIUM SERPL-SCNC: 3.8 MMOL/L (ref 3.4–5.3)
PROT PATTERN UR ELPH-IMP: ABNORMAL
RBC # BLD AUTO: 3.35 10E12/L (ref 3.8–5.2)
RBC # BLD AUTO: 3.41 10E12/L (ref 3.8–5.2)
RBC #/AREA URNS AUTO: 47 /HPF (ref 0–2)
SODIUM SERPL-SCNC: 139 MMOL/L (ref 133–144)
SP GR UR STRIP: 1.01 (ref 1–1.03)
URN SPEC COLLECT METH UR: ABNORMAL
UROBILINOGEN UR STRIP-MCNC: NORMAL MG/DL (ref 0–2)
WBC # BLD AUTO: 5 10E9/L (ref 4–11)
WBC # BLD AUTO: 5 10E9/L (ref 4–11)
WBC #/AREA URNS AUTO: 25 /HPF (ref 0–2)

## 2017-04-27 PROCEDURE — 27210903 ZZH KIT CR5

## 2017-04-27 PROCEDURE — 88313 SPECIAL STAINS GROUP 2: CPT | Performed by: INTERNAL MEDICINE

## 2017-04-27 PROCEDURE — 80048 BASIC METABOLIC PNL TOTAL CA: CPT | Performed by: INTERNAL MEDICINE

## 2017-04-27 PROCEDURE — 97535 SELF CARE MNGMENT TRAINING: CPT | Mod: GO

## 2017-04-27 PROCEDURE — 81001 URINALYSIS AUTO W/SCOPE: CPT | Performed by: INTERNAL MEDICINE

## 2017-04-27 PROCEDURE — 25000125 ZZHC RX 250: Performed by: INTERNAL MEDICINE

## 2017-04-27 PROCEDURE — 25000132 ZZH RX MED GY IP 250 OP 250 PS 637: Mod: GY | Performed by: INTERNAL MEDICINE

## 2017-04-27 PROCEDURE — 27210909 ZZH NEEDLE CR5

## 2017-04-27 PROCEDURE — 88348 ELECTRON MICROSCOPY DX: CPT | Performed by: INTERNAL MEDICINE

## 2017-04-27 PROCEDURE — 25000131 ZZH RX MED GY IP 250 OP 636 PS 637: Mod: GY | Performed by: INTERNAL MEDICINE

## 2017-04-27 PROCEDURE — 88346 IMFLUOR 1ST 1ANTB STAIN PX: CPT | Performed by: INTERNAL MEDICINE

## 2017-04-27 PROCEDURE — 36415 COLL VENOUS BLD VENIPUNCTURE: CPT | Performed by: INTERNAL MEDICINE

## 2017-04-27 PROCEDURE — 85018 HEMOGLOBIN: CPT | Performed by: INTERNAL MEDICINE

## 2017-04-27 PROCEDURE — 97110 THERAPEUTIC EXERCISES: CPT | Mod: GO

## 2017-04-27 PROCEDURE — 25000125 ZZHC RX 250: Performed by: STUDENT IN AN ORGANIZED HEALTH CARE EDUCATION/TRAINING PROGRAM

## 2017-04-27 PROCEDURE — A9270 NON-COVERED ITEM OR SERVICE: HCPCS | Mod: GY | Performed by: INTERNAL MEDICINE

## 2017-04-27 PROCEDURE — 27210732 ZZH ACCESSORY CR1

## 2017-04-27 PROCEDURE — 99233 SBSQ HOSP IP/OBS HIGH 50: CPT | Mod: GC | Performed by: INTERNAL MEDICINE

## 2017-04-27 PROCEDURE — 0TB03ZX EXCISION OF RIGHT KIDNEY, PERCUTANEOUS APPROACH, DIAGNOSTIC: ICD-10-PCS | Performed by: RADIOLOGY

## 2017-04-27 PROCEDURE — 88350 IMFLUOR EA ADDL 1ANTB STN PX: CPT | Performed by: INTERNAL MEDICINE

## 2017-04-27 PROCEDURE — 76942 ECHO GUIDE FOR BIOPSY: CPT

## 2017-04-27 PROCEDURE — 00000146 ZZHCL STATISTIC GLUCOSE BY METER IP

## 2017-04-27 PROCEDURE — 84100 ASSAY OF PHOSPHORUS: CPT | Performed by: INTERNAL MEDICINE

## 2017-04-27 PROCEDURE — 12000001 ZZH R&B MED SURG/OB UMMC

## 2017-04-27 PROCEDURE — 87086 URINE CULTURE/COLONY COUNT: CPT | Performed by: INTERNAL MEDICINE

## 2017-04-27 PROCEDURE — 99152 MOD SED SAME PHYS/QHP 5/>YRS: CPT

## 2017-04-27 PROCEDURE — 85025 COMPLETE CBC W/AUTO DIFF WBC: CPT | Performed by: INTERNAL MEDICINE

## 2017-04-27 PROCEDURE — 25000128 H RX IP 250 OP 636: Performed by: STUDENT IN AN ORGANIZED HEALTH CARE EDUCATION/TRAINING PROGRAM

## 2017-04-27 PROCEDURE — C1769 GUIDE WIRE: HCPCS

## 2017-04-27 PROCEDURE — 40000133 ZZH STATISTIC OT WARD VISIT

## 2017-04-27 PROCEDURE — 83735 ASSAY OF MAGNESIUM: CPT | Performed by: INTERNAL MEDICINE

## 2017-04-27 PROCEDURE — 88305 TISSUE EXAM BY PATHOLOGIST: CPT | Performed by: INTERNAL MEDICINE

## 2017-04-27 RX ORDER — FLUMAZENIL 0.1 MG/ML
0.2 INJECTION, SOLUTION INTRAVENOUS
Status: DISCONTINUED | OUTPATIENT
Start: 2017-04-27 | End: 2017-04-27 | Stop reason: HOSPADM

## 2017-04-27 RX ORDER — FENTANYL CITRATE 50 UG/ML
25-50 INJECTION, SOLUTION INTRAMUSCULAR; INTRAVENOUS EVERY 5 MIN PRN
Status: DISCONTINUED | OUTPATIENT
Start: 2017-04-27 | End: 2017-04-27 | Stop reason: HOSPADM

## 2017-04-27 RX ORDER — CLONIDINE HYDROCHLORIDE 0.1 MG/1
0.05 TABLET ORAL 2 TIMES DAILY
Status: DISCONTINUED | OUTPATIENT
Start: 2017-04-27 | End: 2017-04-28

## 2017-04-27 RX ORDER — NALOXONE HYDROCHLORIDE 0.4 MG/ML
.1-.4 INJECTION, SOLUTION INTRAMUSCULAR; INTRAVENOUS; SUBCUTANEOUS
Status: DISCONTINUED | OUTPATIENT
Start: 2017-04-27 | End: 2017-04-27 | Stop reason: HOSPADM

## 2017-04-27 RX ORDER — HYDRALAZINE HYDROCHLORIDE 20 MG/ML
10 INJECTION INTRAMUSCULAR; INTRAVENOUS ONCE
Status: COMPLETED | OUTPATIENT
Start: 2017-04-27 | End: 2017-04-27

## 2017-04-27 RX ORDER — LABETALOL 300 MG/1
300 TABLET, FILM COATED ORAL 2 TIMES DAILY
Status: DISCONTINUED | OUTPATIENT
Start: 2017-04-27 | End: 2017-04-28 | Stop reason: HOSPADM

## 2017-04-27 RX ADMIN — Medication 10 ML: at 10:30

## 2017-04-27 RX ADMIN — INSULIN ASPART 1 UNITS: 100 INJECTION, SOLUTION INTRAVENOUS; SUBCUTANEOUS at 11:33

## 2017-04-27 RX ADMIN — INSULIN ASPART 1 UNITS: 100 INJECTION, SOLUTION INTRAVENOUS; SUBCUTANEOUS at 17:26

## 2017-04-27 RX ADMIN — AMLODIPINE BESYLATE 10 MG: 10 TABLET ORAL at 08:14

## 2017-04-27 RX ADMIN — CINACALCET HYDROCHLORIDE 30 MG: 30 TABLET, COATED ORAL at 08:15

## 2017-04-27 RX ADMIN — SIROLIMUS 1.5 MG: 1 TABLET, SUGAR COATED ORAL at 08:14

## 2017-04-27 RX ADMIN — AMPICILLIN SODIUM 2 G: 2 INJECTION, POWDER, FOR SOLUTION INTRAMUSCULAR; INTRAVENOUS at 13:40

## 2017-04-27 RX ADMIN — INSULIN ASPART 2 UNITS: 100 INJECTION, SOLUTION INTRAVENOUS; SUBCUTANEOUS at 08:18

## 2017-04-27 RX ADMIN — FAMOTIDINE 20 MG: 20 TABLET ORAL at 08:16

## 2017-04-27 RX ADMIN — AMPICILLIN SODIUM 2 G: 2 INJECTION, POWDER, FOR SOLUTION INTRAMUSCULAR; INTRAVENOUS at 05:41

## 2017-04-27 RX ADMIN — TACROLIMUS 1.5 MG: 0.5 CAPSULE ORAL at 19:11

## 2017-04-27 RX ADMIN — PREDNISONE 2.5 MG: 2.5 TABLET ORAL at 08:16

## 2017-04-27 RX ADMIN — PRAVASTATIN SODIUM 10 MG: 10 TABLET ORAL at 08:16

## 2017-04-27 RX ADMIN — Medication 0.05 MG: at 11:37

## 2017-04-27 RX ADMIN — AMPICILLIN SODIUM 2 G: 2 INJECTION, POWDER, FOR SOLUTION INTRAMUSCULAR; INTRAVENOUS at 21:57

## 2017-04-27 RX ADMIN — LABETALOL HCL 300 MG: 300 TABLET, FILM COATED ORAL at 21:58

## 2017-04-27 RX ADMIN — HYDRALAZINE HYDROCHLORIDE 10 MG: 20 INJECTION INTRAMUSCULAR; INTRAVENOUS at 17:26

## 2017-04-27 RX ADMIN — LEVOTHYROXINE SODIUM 50 MCG: 50 TABLET ORAL at 08:16

## 2017-04-27 RX ADMIN — Medication 0.05 MG: at 21:58

## 2017-04-27 RX ADMIN — MIDAZOLAM 1.5 MG: 1 INJECTION INTRAMUSCULAR; INTRAVENOUS at 10:28

## 2017-04-27 RX ADMIN — TACROLIMUS 1.5 MG: 0.5 CAPSULE ORAL at 08:16

## 2017-04-27 RX ADMIN — FENTANYL CITRATE 75 MCG: 50 INJECTION INTRAMUSCULAR; INTRAVENOUS at 10:29

## 2017-04-27 NOTE — PLAN OF CARE
Problem: Goal Outcome Summary  Goal: Goal Outcome Summary  Outcome: No Change  Vs wnl. Alert and oriented. Denies pain. Turned and repositioned late evening at least twice then pt fell asleep and stayed asleep the whole night. PIV continues to run TKO after Antibiotic administration. Johnson intact and draining. Had small soft BM. . NPO since midnight, scheduled for renal biopsy today.  R: Needs pre procedure scrubs completed. Continue to monitor.

## 2017-04-27 NOTE — PROGRESS NOTES
Interventional Radiology Brief Post Procedure Note    Procedure: @FVRISFRMTLINK([id number])@    Proceduralist: Alvaro Jimenez MD and None    Assistant: Antonino Medrano MD     Time Out: Prior to the start of the procedure and with procedural staff participation, I verbally confirmed the patient s identity using two indicators, relevant allergies, that the procedure was appropriate and matched the consent or emergent situation, and that the correct equipment/implants were available. Immediately prior to starting the procedure I conducted the Time Out with the procedural staff and re-confirmed the patient s name, procedure, and site/side. (The [facility] universal protocol was followed.)  Yes    Sedation: IR Nurse Monitored Care   Post Procedure Summary:  Prior to the start of the procedure and with procedural staff participation, I verbally confirmed the patient s identity using two indicators, relevant allergies, that the procedure was appropriate and matched the consent or emergent situation, and that the correct equipment/implants were available. Immediately prior to starting the procedure I conducted the Time Out with the procedural staff and re-confirmed the patient s name, procedure, and site/side. (The [facility] universal protocol was followed.)  Yes       Sedatives: Fentanyl and Midazolam (Versed)    Vital signs, airway and pulse oximetry were monitored and remained stable throughout the procedure and sedation was maintained until the procedure was complete.  The patient was monitored by staff until sedation discharge criteria were met.    Patient tolerance: Patient tolerated the procedure well with no immediate complications.    Time of sedation in minutes: 20 minutes from beginning to end of physician one to one monitoring.        Findings: Three random core biopsy of RLQ renal transplant.    Estimated Blood Loss: Minimal    Fluoroscopy Time: Not applicable    SPECIMENS: Core needle biopsy specimens  sent for pathological analysis    Complications: 1. None     Condition: Stable    Plan: Return to inpatient unit and following post biopsy orders.    Comments: See dictated procedure note for full details.    Antonino Medrano MD

## 2017-04-27 NOTE — PROGRESS NOTES
"[facility] Internal Medicine Progress Note     Gem Jade  MRN: [id number]  YOB: 1945          Interval History:     Nursing notes reviewed. Pt did well overnight without any hallucinations.  She was able to sleep quite well.  Remained afebrile with hypertension. Stable on room air.  Today she is in good spirits.  She tolerated the biopsy well with a small amount of pain at the site, mainly from them pressing on it.  No bleeding.  Hgb stable post procedure. BM x 2 yesterday.  UOP 2.5 L.          4 pt ROS negative other than that noted above.          Physical Exam:   Vitals were reviewed  Blood pressure 153/74, pulse 72, temperature 97.8  F (36.6  C), temperature source Oral, resp. rate 16, height 1.702 m (5' 7.01\"), weight 83.8 kg (184 lb 11.2 oz), SpO2 100 %, not currently breastfeeding.    Physical Exam:    General: Alert, NAD, laying in bed, smiling  HEENT: NC, AT, anicteric sclerae, MMM  Neck: Supple  Lungs: CTAB anteriorly, no wheezes  CV: RRR, s1, s2, no m/r/g  Abd: BS+, soft, ND, non-tender, no HSM, biopsy site with no bleeding or surrounding erythema and minimal tenderness  Neuro: awake, alert and oriented.  CN's grossly intact, no focal deficits         Data:     All labs and imaging reviewed         Assessment and Plan:   Assessment:    Gem Jade is a 70 yo F with a history of kidney transplant (1999), neurogenic bladder with chronic rico, recurrent UTI's that present with AMS, T2DM, hypertension, and hypothyroid who was admitted with altered mental status and UTI, clinically improving but with concerns for worsening ROB with nephrotic range proteinuria and delirium, doing well s/p biopsy.      # Complicated Urinary tract infection  # Neurogenic bladder w/ chronic rico  History of recurrent UTIs, most recent admission in March presenting with confusion. Previous urine cultures have grown e faecalis, e coli, and klebsiella, most of which were either pan-sensitive or " resistant to ampicillin or TMP-SMX.  She did have a UTI with ESBL E. Coli in February 2016.  This admission UA with 155 WBCs. S/p ceftriaxone x 1 in ED and 2 L NS.  Urine culture growing 2 strains of Enterococcus faecalis both sensitive to ampicillin.  BCx NGTD.  - Ampicillin 2 G IV q 6 H  - Follow urine culture and blood cultures   - Transplant nephrology and Transplant ID consulted to discuss measures that can be taken to prevent recurrent UTI's such as suprapubic catheter placement vs antibiotic ppx. No evidence to support suprapubic catheter placement.  Regarding amoxicillin ppx, we are concerned she would grow multiple drug resistant organisms, and she has a history of C. Diff.      # Toxic metabolic encephalopathy sec to UTI and now appears to be Delirium   Per chart review and discussion with past care providers, she tends to have some confusion after UTI's.  Does not appear to have worsening infection.  No uremia.   - She usually takes gabapentin and Trileptal for chronic pain.  Will hold these sedating medications until MS improves.  No complaints of pain currently.    - Neuro checks q 4 H   - Delirium precautions  - Consider psychiatry evaluation if worsens  - Will avoid starting any medications at this time as she is not a danger to herself or others     # ROB: Likely intrarenal with nephrotic range proteinuria. Received LR bolus yesterday. TTE today is normal with normal IVC.  No urine eosinophils.  Urine studies show nephrotic range proteinuria.  UA with pyuria and repeat UC pending.  SPEP with very small monoclonal peak and very high albuminuria.    - Urine albumin:Cr pending  - Renal biopsy today.  Results pending  - UA post biopsy    # H/o kidney transplant -   - Decreased Tacrolimus to 1.5 mg PO BID.  Recheck level on 4/28/17  - Sirolimus 1.5 mg qday  - Prednisone 2.5 mg qday  - Transplant Nephrology consulted, appreciate recs      # Hypercalcemia -Resolved  - Pt had hypercalcemia when corrected for  her low albumin. Received lasix 20 mg IV for this on 4/22 and 4/23. Improved to normal levels. Unlikely it was contributing to her altered behavior   - Cinacalcet 30 mg PO qday      #T2DM  - Glargine at 12 units qday (half dose) as pt not having much PO intake  - Medium dose SSI with hypoglycemia protocol       #Hypertension   - Clonidine 0.05 mg BID   - labetalol 300 mg BID  - Amlodipine to 10 mg qday  - Hydralazine if SBP > 160     # Hypothyroid  - Continue PTA synthroid 50 mcg qday    #HLD - continue home pravastatin  - Hold ASA for renal biopsy today     FEN/GI - Change diet to regular diet to improve oral intake.   - Zofran PRN   - famotidine 20 mg qday      Prophylaxis: DVT: SCDs.  CPAP per home settings.   Disposition: Pending improvement in delirium and ROB.  Possibly home tomorrow.   Code Status: FULL    Pt discussed with Dr Lawanda Green MD  Internal Medicine-Pediatrics, PGY4  [phone]

## 2017-04-27 NOTE — PROGRESS NOTES
Interventional Radiology Pre-Procedure Sedation Assessment   Time of Assessment: 9:56 AM    Expected Level: Moderate Sedation    Indication: Sedation is required for the following type of Procedure: Biopsy    Sedation and procedural consent: Risks, benefits and alternatives were discussed with Patient    PO Intake: Appropriately NPO for procedure    ASA Class: Class 2 - MILD SYSTEMIC DISEASE, NO ACUTE PROBLEMS, NO FUNCTIONAL LIMITATIONS.    Mallampati: Grade 1:  Soft palate, uvula, tonsillar pillars, and posterior pharyngeal wall visible    Lungs: Lungs Clear with good breath sounds bilaterally    Heart: Normal heart sounds and rate    History and physical reviewed and no updates needed. I have reviewed the lab findings, diagnostic data, medications, and the plan for sedation. I have determined this patient to be an appropriate candidate for the planned sedation and procedure and have reassessed the patient IMMEDIATELY PRIOR to sedation and procedure.    Antonino Medrano MD

## 2017-04-27 NOTE — PLAN OF CARE
Problem: Goal Outcome Summary  Goal: Goal Outcome Summary  Outcome: No Change  Pt A&O, VSS. Rico intact and drained 1300 over shift. IV antibiotics given in PIV then switched to TKO. Pt experiencing vertigo throughout shift. Was unable to eat much dinner.  & 205. Insulin given per MAR. Renal Biopsy scheduled for tomorrow. NPO at midnight. Continue to monitor.     Add: rico cares completed.

## 2017-04-27 NOTE — PLAN OF CARE
Problem: Goal Outcome Summary  Goal: Goal Outcome Summary  Outcome: No Change  Pt is A&O. AVSS on RA. Johnson patent and draining well. Abx infused, then set to TKO.  and 174. Renal biopsy done today, pt must remain almost flat for 1 more hour. Hourly VS needed x2. If systolic BP is greater than 160, a one-time dose of IV hydralazine is available. No acute concerns     Plan for d/c tomorrow, barring any abnormal biopsy results.     Continue to monitor and follow the POC.

## 2017-04-27 NOTE — PLAN OF CARE
Problem: Goal Outcome Summary  Goal: Goal Outcome Summary  OT/5B: Pt completed UE strength and ROM exercises with demos, min cues and rest breaks as needed. Completed g/h tasks with use of tray table set up and min cues      Per plan established by the OT, the recommendation for dc location is  home with 24hr assist and home PT once medically appropriate.

## 2017-04-28 ENCOUNTER — APPOINTMENT (OUTPATIENT)
Dept: ULTRASOUND IMAGING | Facility: CLINIC | Age: 72
DRG: 698 | End: 2017-04-28
Attending: INTERNAL MEDICINE
Payer: MEDICARE

## 2017-04-28 VITALS
TEMPERATURE: 97.8 F | OXYGEN SATURATION: 98 % | WEIGHT: 199.5 LBS | HEART RATE: 68 BPM | SYSTOLIC BLOOD PRESSURE: 137 MMHG | DIASTOLIC BLOOD PRESSURE: 50 MMHG | RESPIRATION RATE: 16 BRPM | BODY MASS INDEX: 31.31 KG/M2 | HEIGHT: 67 IN

## 2017-04-28 LAB
ALBUMIN UR QL: ABNORMAL
ALPHA1 GLOB 24H UR QL ELPH: ABNORMAL
ALPHA2 GLOB UR QL ELPH: ABNORMAL
ANION GAP SERPL CALCULATED.3IONS-SCNC: 11 MMOL/L (ref 3–14)
B-GLOBULIN UR QL ELPH: ABNORMAL
BACTERIA SPEC CULT: NO GROWTH
BUN SERPL-MCNC: 22 MG/DL (ref 7–30)
CALCIUM SERPL-MCNC: 7.6 MG/DL (ref 8.5–10.1)
CHLORIDE SERPL-SCNC: 108 MMOL/L (ref 94–109)
CO2 SERPL-SCNC: 23 MMOL/L (ref 20–32)
COLLECT DURATION TIME SPEC: ABNORMAL H
CREAT SERPL-MCNC: 1.71 MG/DL (ref 0.52–1.04)
ERYTHROCYTE [DISTWIDTH] IN BLOOD BY AUTOMATED COUNT: 15.1 % (ref 10–15)
GAMMA GLOB UR QL ELPH: ABNORMAL
GFR SERPL CREATININE-BSD FRML MDRD: 29 ML/MIN/1.7M2
GLUCOSE BLDC GLUCOMTR-MCNC: 159 MG/DL (ref 70–99)
GLUCOSE BLDC GLUCOMTR-MCNC: 175 MG/DL (ref 70–99)
GLUCOSE BLDC GLUCOMTR-MCNC: 195 MG/DL (ref 70–99)
GLUCOSE SERPL-MCNC: 178 MG/DL (ref 70–99)
HCT VFR BLD AUTO: 30 % (ref 35–47)
HGB BLD-MCNC: 9.4 G/DL (ref 11.7–15.7)
INTERPRETATION UR IFE-IMP: ABNORMAL
KAPPA LC FREE 24H UR-MRATE: ABNORMAL MG/(24.H)
KAPPA LC FREE UR-MCNC: 19.8 MG/DL
KAPPA LC FREE/LAMBDA FREE UR: 4.66
LAMBDA LC FREE 24H UR-MRATE: ABNORMAL MG/(24.H)
LAMBDA LC FREE UR-MCNC: 4.25 MG/DL
Lab: NORMAL
MCH RBC QN AUTO: 28.6 PG (ref 26.5–33)
MCHC RBC AUTO-ENTMCNC: 31.3 G/DL (ref 31.5–36.5)
MCV RBC AUTO: 91 FL (ref 78–100)
MICRO REPORT STATUS: NORMAL
PHOSPHATE SERPL-MCNC: 3.1 MG/DL (ref 2.5–4.5)
PLATELET # BLD AUTO: 98 10E9/L (ref 150–450)
POTASSIUM SERPL-SCNC: 3.6 MMOL/L (ref 3.4–5.3)
PROT 24H UR-MRATE: ABNORMAL G/(24.H)
RBC # BLD AUTO: 3.29 10E12/L (ref 3.8–5.2)
SODIUM SERPL-SCNC: 142 MMOL/L (ref 133–144)
SPECIMEN SOURCE: NORMAL
SPECIMEN VOL ?TM UR: ABNORMAL ML
TACROLIMUS BLD-MCNC: 7.6 UG/L (ref 5–15)
TME LAST DOSE: NORMAL H
WBC # BLD AUTO: 5 10E9/L (ref 4–11)

## 2017-04-28 PROCEDURE — 25000132 ZZH RX MED GY IP 250 OP 250 PS 637: Mod: GY | Performed by: INTERNAL MEDICINE

## 2017-04-28 PROCEDURE — 80197 ASSAY OF TACROLIMUS: CPT | Performed by: INTERNAL MEDICINE

## 2017-04-28 PROCEDURE — 00000146 ZZHCL STATISTIC GLUCOSE BY METER IP

## 2017-04-28 PROCEDURE — A9270 NON-COVERED ITEM OR SERVICE: HCPCS | Mod: GY | Performed by: INTERNAL MEDICINE

## 2017-04-28 PROCEDURE — 25000131 ZZH RX MED GY IP 250 OP 636 PS 637: Mod: GY | Performed by: INTERNAL MEDICINE

## 2017-04-28 PROCEDURE — 99238 HOSP IP/OBS DSCHRG MGMT 30/<: CPT | Mod: GC | Performed by: INTERNAL MEDICINE

## 2017-04-28 PROCEDURE — 85027 COMPLETE CBC AUTOMATED: CPT | Performed by: INTERNAL MEDICINE

## 2017-04-28 PROCEDURE — 36415 COLL VENOUS BLD VENIPUNCTURE: CPT | Performed by: INTERNAL MEDICINE

## 2017-04-28 PROCEDURE — 25000125 ZZHC RX 250: Performed by: INTERNAL MEDICINE

## 2017-04-28 PROCEDURE — 76776 US EXAM K TRANSPL W/DOPPLER: CPT

## 2017-04-28 PROCEDURE — 84100 ASSAY OF PHOSPHORUS: CPT | Performed by: INTERNAL MEDICINE

## 2017-04-28 PROCEDURE — 80048 BASIC METABOLIC PNL TOTAL CA: CPT | Performed by: INTERNAL MEDICINE

## 2017-04-28 RX ORDER — CINACALCET 30 MG/1
30 TABLET, FILM COATED ORAL DAILY
Qty: 60 TABLET | Refills: 3 | Status: ON HOLD | OUTPATIENT
Start: 2017-04-28 | End: 2017-12-25

## 2017-04-28 RX ORDER — LOSARTAN POTASSIUM 25 MG/1
25 TABLET ORAL DAILY
Qty: 90 TABLET | Refills: 3 | Status: SHIPPED | OUTPATIENT
Start: 2017-04-28 | End: 2017-05-09

## 2017-04-28 RX ORDER — TACROLIMUS 0.5 MG/1
1.5 CAPSULE ORAL 2 TIMES DAILY
Qty: 180 CAPSULE | Refills: 6 | Status: SHIPPED | OUTPATIENT
Start: 2017-04-28 | End: 2017-04-28

## 2017-04-28 RX ORDER — GABAPENTIN 300 MG/1
300-600 CAPSULE ORAL 3 TIMES DAILY PRN
Qty: 180 CAPSULE | Refills: 3 | Status: SHIPPED | OUTPATIENT
Start: 2017-04-28 | End: 2017-04-28

## 2017-04-28 RX ORDER — ASPIRIN 81 MG/1
81 TABLET, CHEWABLE ORAL DAILY
Qty: 90 TABLET | Refills: 3 | Status: SHIPPED | OUTPATIENT
Start: 2017-05-04 | End: 2017-04-28

## 2017-04-28 RX ORDER — LABETALOL 300 MG/1
300 TABLET, FILM COATED ORAL 2 TIMES DAILY
Qty: 120 TABLET | Refills: 3 | Status: SHIPPED | OUTPATIENT
Start: 2017-04-28 | End: 2017-04-28

## 2017-04-28 RX ORDER — MECLIZINE HCL 12.5 MG 12.5 MG/1
12.5 TABLET ORAL 3 TIMES DAILY PRN
Status: DISCONTINUED | OUTPATIENT
Start: 2017-04-28 | End: 2017-04-28 | Stop reason: HOSPADM

## 2017-04-28 RX ORDER — AMOXICILLIN 500 MG/1
500 CAPSULE ORAL 3 TIMES DAILY
Qty: 18 CAPSULE | Refills: 0 | Status: SHIPPED | OUTPATIENT
Start: 2017-04-28 | End: 2017-04-28

## 2017-04-28 RX ORDER — AMOXICILLIN 500 MG/1
500 CAPSULE ORAL 3 TIMES DAILY
Qty: 18 CAPSULE | Refills: 0 | Status: SHIPPED | OUTPATIENT
Start: 2017-04-28 | End: 2017-05-09

## 2017-04-28 RX ORDER — CINACALCET 30 MG/1
30 TABLET, FILM COATED ORAL DAILY
Qty: 60 TABLET | Refills: 3 | Status: SHIPPED | OUTPATIENT
Start: 2017-04-28 | End: 2017-04-28

## 2017-04-28 RX ORDER — AMLODIPINE BESYLATE 5 MG/1
5 TABLET ORAL DAILY
Status: DISCONTINUED | OUTPATIENT
Start: 2017-04-29 | End: 2017-04-28 | Stop reason: HOSPADM

## 2017-04-28 RX ORDER — LOSARTAN POTASSIUM 25 MG/1
25 TABLET ORAL DAILY
Qty: 90 TABLET | Refills: 3 | Status: SHIPPED | OUTPATIENT
Start: 2017-04-28 | End: 2017-04-28

## 2017-04-28 RX ORDER — LOSARTAN POTASSIUM 25 MG/1
25 TABLET ORAL DAILY
Status: DISCONTINUED | OUTPATIENT
Start: 2017-04-28 | End: 2017-04-28 | Stop reason: HOSPADM

## 2017-04-28 RX ORDER — TACROLIMUS 1 MG/1
1 CAPSULE ORAL 2 TIMES DAILY
Qty: 180 CAPSULE | Refills: 3 | Status: SHIPPED | OUTPATIENT
Start: 2017-04-28 | End: 2017-08-14

## 2017-04-28 RX ORDER — AMLODIPINE BESYLATE 5 MG/1
5 TABLET ORAL DAILY PRN
Qty: 90 TABLET | Refills: 3 | Status: SHIPPED | OUTPATIENT
Start: 2017-04-28 | End: 2017-06-28

## 2017-04-28 RX ORDER — TACROLIMUS 0.5 MG/1
0.5 CAPSULE ORAL 2 TIMES DAILY
Qty: 180 CAPSULE | Refills: 3 | Status: SHIPPED | OUTPATIENT
Start: 2017-04-28 | End: 2017-07-25

## 2017-04-28 RX ORDER — PREDNISONE 5 MG/1
5 TABLET ORAL DAILY
Status: DISCONTINUED | OUTPATIENT
Start: 2017-04-29 | End: 2017-04-28 | Stop reason: HOSPADM

## 2017-04-28 RX ORDER — AMLODIPINE BESYLATE 5 MG/1
5 TABLET ORAL DAILY PRN
Qty: 90 TABLET | Refills: 3 | Status: SHIPPED | OUTPATIENT
Start: 2017-04-28 | End: 2017-04-28

## 2017-04-28 RX ORDER — TACROLIMUS 1 MG/1
2 CAPSULE ORAL 2 TIMES DAILY
Qty: 120 CAPSULE | Refills: 6 | Status: SHIPPED | OUTPATIENT
Start: 2017-04-28 | End: 2017-04-28

## 2017-04-28 RX ORDER — ASPIRIN 81 MG/1
81 TABLET, CHEWABLE ORAL DAILY
Qty: 90 TABLET | Refills: 3 | Status: ON HOLD | OUTPATIENT
Start: 2017-05-04 | End: 2018-05-25

## 2017-04-28 RX ORDER — OXCARBAZEPINE 300 MG/1
300 TABLET, FILM COATED ORAL 2 TIMES DAILY PRN
Qty: 180 TABLET | Refills: 3 | Status: SHIPPED | OUTPATIENT
Start: 2017-04-28 | End: 2017-04-28

## 2017-04-28 RX ORDER — PREDNISONE 5 MG/1
5 TABLET ORAL DAILY
Qty: 90 TABLET | Refills: 3 | Status: SHIPPED | OUTPATIENT
Start: 2017-04-28 | End: 2017-07-25

## 2017-04-28 RX ORDER — OXCARBAZEPINE 300 MG/1
300 TABLET, FILM COATED ORAL 2 TIMES DAILY PRN
Qty: 180 TABLET | Refills: 3 | Status: SHIPPED | OUTPATIENT
Start: 2017-04-28 | End: 2017-05-09

## 2017-04-28 RX ORDER — TACROLIMUS 1 MG/1
2 CAPSULE ORAL
Status: DISCONTINUED | OUTPATIENT
Start: 2017-04-28 | End: 2017-04-28 | Stop reason: HOSPADM

## 2017-04-28 RX ORDER — GABAPENTIN 300 MG/1
300-600 CAPSULE ORAL 3 TIMES DAILY PRN
Qty: 180 CAPSULE | Refills: 3 | Status: SHIPPED | OUTPATIENT
Start: 2017-04-28 | End: 2017-05-09

## 2017-04-28 RX ORDER — PREDNISONE 5 MG/1
5 TABLET ORAL DAILY
Qty: 90 TABLET | Refills: 3 | Status: SHIPPED | OUTPATIENT
Start: 2017-04-28 | End: 2017-04-28

## 2017-04-28 RX ORDER — LABETALOL 300 MG/1
300 TABLET, FILM COATED ORAL 2 TIMES DAILY
Qty: 120 TABLET | Refills: 3 | Status: SHIPPED | OUTPATIENT
Start: 2017-04-28 | End: 2017-05-09

## 2017-04-28 RX ADMIN — AMPICILLIN SODIUM 2 G: 2 INJECTION, POWDER, FOR SOLUTION INTRAMUSCULAR; INTRAVENOUS at 12:36

## 2017-04-28 RX ADMIN — MECLIZINE 12.5 MG: 12.5 TABLET ORAL at 15:43

## 2017-04-28 RX ADMIN — AMPICILLIN SODIUM 2 G: 2 INJECTION, POWDER, FOR SOLUTION INTRAMUSCULAR; INTRAVENOUS at 05:30

## 2017-04-28 RX ADMIN — INSULIN ASPART 1 UNITS: 100 INJECTION, SOLUTION INTRAVENOUS; SUBCUTANEOUS at 12:36

## 2017-04-28 RX ADMIN — PRAVASTATIN SODIUM 10 MG: 10 TABLET ORAL at 08:26

## 2017-04-28 RX ADMIN — FAMOTIDINE 20 MG: 20 TABLET ORAL at 08:26

## 2017-04-28 RX ADMIN — INSULIN ASPART 1 UNITS: 100 INJECTION, SOLUTION INTRAVENOUS; SUBCUTANEOUS at 08:27

## 2017-04-28 RX ADMIN — AMLODIPINE BESYLATE 10 MG: 10 TABLET ORAL at 08:27

## 2017-04-28 RX ADMIN — TACROLIMUS 1.5 MG: 0.5 CAPSULE ORAL at 08:27

## 2017-04-28 RX ADMIN — LABETALOL HCL 300 MG: 300 TABLET, FILM COATED ORAL at 08:27

## 2017-04-28 RX ADMIN — PREDNISONE 2.5 MG: 2.5 TABLET ORAL at 08:27

## 2017-04-28 RX ADMIN — LEVOTHYROXINE SODIUM 50 MCG: 50 TABLET ORAL at 08:28

## 2017-04-28 RX ADMIN — SIROLIMUS 1.5 MG: 1 TABLET, SUGAR COATED ORAL at 08:27

## 2017-04-28 RX ADMIN — LOSARTAN POTASSIUM 25 MG: 25 TABLET ORAL at 14:48

## 2017-04-28 RX ADMIN — CINACALCET HYDROCHLORIDE 30 MG: 30 TABLET, COATED ORAL at 08:27

## 2017-04-28 RX ADMIN — Medication 0.05 MG: at 08:26

## 2017-04-28 RX ADMIN — INSULIN GLARGINE 12 UNITS: 100 INJECTION, SOLUTION SUBCUTANEOUS at 00:27

## 2017-04-28 NOTE — DISCHARGE SUMMARY
"                                                                 Medicine Discharge Summary  Gem Jade MRN: [id number]  1945  Home Clinic: [facility]  Primary care provider: Marivel Barcenas  ___________________________________          Date of Admission:  4/18/2017  Date of Discharge:  4/28/2017   Admitting Physician:  Paola Thakur MD  Discharge Physician:  Christiano Webber MD   Discharging Service:  Internal Medicine, [place] [id number]     Primary Provider: Marivel Barcenas         Reason for Admission:     Confusion, unresponsiveness          Discharge Diagnosis:     Acute Kidney Injury with 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  No evidence of rejection, amyloidosis or monoclonal protein process.  No membranous nephropathy.    Electron Microscopy pending.     US Renal Transplant:   \"1.Status post of renal transplant biopsy demonstrates small area of  swirling artifact, likely representing a small AV fistula in the upper  pole parenchyma, approximately 1.7 cm the presumed needle entrance.   Small echogenicity is noted upper pole renal cortex, likely related to  a Gelfoam pledget or other hemostasis material. Small amount of  locules of gas in the antidependent calyces.  2. Increased arcuate artery resistive indices range from 0.87-0.91,  which may represent rejection versus other etiology of transplant  dysfunction. \"     Urine culture 4/19/17: 2 strains of Enterococcus faecalis susceptible to ampicillin    Urine culture 4/27/17: No growth     "     Consultations:     Transplant Nephrology  Transplant Infectious Disease  Interventional Radiology         Hospital Course by Problem:      Gem Jade is a 70 yo F with a history of kidney transplant (1999), neurogenic bladder with chronic rico, recurrent UTI's that present with AMS, T2DM, hypertension, and hypothyroid who was admitted with altered mental status and UTI treated with Ampicillin and nephrotic range proteinuria.       # Complicated Urinary tract infection  # Neurogenic bladder w/ chronic rico  Pt has a history of recurrent UTIs, with her most recent admission in March when she also presented with altered mental status. Previous urine cultures have grown E faecalis, E coli, and Klebsiella, most of which were either pan-sensitive or resistant to ampicillin or TMP-SMX. She did have a UTI with ESBL E. coli in February 2016. This admission, urine culture grew 2 strains of Enterococcus faecalis both sensitive to ampicillin.  She was started on Ceftriaxone in the ED, and then switched to Unasyn.  Following culture results, she was switched to Ampicillin, and will be discharged on Amoxicillin to complete a 2 week course of antibiotics given this is a complicated UTI with chronic rico.  Blood cultures remained no growth.  Repeat urine culture on 4/27/17 had no growth.     Transplant nephrology and Transplant ID were consulted to discuss measures that can be taken to prevent recurrent UTI's such as suprapubic catheter placement vs antibiotic ppx. No evidence to support suprapubic catheter placement and decreasing risk of UTI's unless pt is to develop recurrent diarrhea and often has fecal contamination. Regarding amoxicillin ppx, there was concern she would have complications including multiple drug resistant organisms and C. Diff, which she has had in the past.     - Amoxicillin 500 mg TID for 6 more days      # FSGS in Transplanted Kidney with 40% interstitial fibrosis:   # Hypertension  Pt was found  to have ROB with nephrotic range proteinuria, with urine Pro:Cr ratio > 15. There were no urine eosinophils and few cells.  SPEP showed a negligible monoclonal peak, and urine studies revealed that majority of proteinuria was albuminuria.  She underwent renal biopsy on 4/27/17 that revealed focal segmental glomerulosclerosis with about 50% global sclerosis and 40% interstitial fibrosis.  No signs of rejection or a monoclonal protein process such as amyloidosis or light chains.  No signs of an acute pathology or membranous nephropathy.  Electron microscopy of biopsy is still pending at discharge. Post biopsy transplant US was negative for a hematoma.  It is important that her BP be well controlled.  Sirolimus was stopped as it can worsen FSGS.  She was followed closely by transplant nephrology throughout her hospital course.    - Tacrolimus 1.5 mg PO BID. Recheck level on Monday  - Prednisone 5 mg qday  - Labetalol 300 mg PO BID  - Losartan 25 mg PO qday.  Check BMP on Monday  - Stop clonidine  - Check BP twice a day at home, an hour after taking labetalol.  If SBP > 160, take amlodipine 5 mg.  Transplant Nephrology clinic will follow up via phone in 1 week to determine if needs adjustment in antihypertensive regimen.     # Toxic metabolic encephalopathy sec to UTI and now appears to be Delirium   Per chart review and discussion with past care providers, she tends to have some confusion after UTI's that can take days to resolve.  She did develop some paranoia and hallucinations that was resolved for the last 3-4 days of her hospital course.  Her pain medications were held given their sedating nature and delirium precautions were initiated.  She did not require medications as she never became a danger to herself or others.  Her chronic pain did not worsen with holding medications and recommend that they be administered as needed or be weaned off        # Hypercalcemia -Resolved  - Pt had hypercalcemia when corrected  "for her low albumin. Received lasix 20 mg IV for this on 4/22 and 4/23. Improved to normal levels. Unlikely it was contributing to her altered behavior.  She was started on cinacalcet with continued resolution of hypercalcemia.   - Cinacalcet 30 mg PO qday        #T2DM - Glargine was initiated at 12 units qday (half dose) as pt not having much PO intake and she was given 2-6 units per day of sliding scale.    - Decreased home lantus to 16 units qday with current sliding scale regimen           # Hypothyroid  - Continue PTA synthroid 50 mcg qday     #HLD - continue home pravastatin  - Hold ASA for 1 week after renal biopsy and then can resume.          Physical Exam on day of Discharge:  Blood pressure 137/50, pulse 68, temperature 97.8  F (36.6  C), temperature source Oral, resp. rate 16, height 1.702 m (5' 7.01\"), weight 90.5 kg (199 lb 8 oz), SpO2 98 %, not currently breastfeeding.    General: Alert, NAD, laying in bed, smiling  HEENT: NC, AT, anicteric sclerae, MMM  Neck: Supple  Lungs: CTAB anteriorly, no wheezes  CV: RRR, s1, s2, no m/r/g  Abd: BS+, soft, ND, non-tender, no HSM, biopsy site with no bleeding or surrounding erythema and minimal tenderness  Neuro: awake, alert and oriented. CN's grossly intact, no focal deficits      Lines/Tubes:   Johnson Catheter         Pending Results:     Renal Biopsy Final Read   Free kappa and lambda light chains of urine          Discharge Medications:     Current Discharge Medication List      START taking these medications    Details   losartan (COZAAR) 25 MG tablet Take 1 tablet (25 mg) by mouth daily  Qty: 90 tablet, Refills: 3    Associated Diagnoses: Kidney replaced by transplant; Benign essential hypertension      amLODIPine (NORVASC) 5 MG tablet Take 1 tablet (5 mg) by mouth daily as needed For systolic blood pressure > 160 when checked 1 hour after taking labetalol.  Qty: 90 tablet, Refills: 3    Associated Diagnoses: Kidney replaced by transplant; Benign essential " hypertension      cinacalcet (SENSIPAR) 30 MG tablet Take 1 tablet (30 mg) by mouth daily  Qty: 60 tablet, Refills: 3    Associated Diagnoses: Kidney replaced by transplant; Hypercalcemia      amoxicillin (AMOXIL) 500 MG capsule Take 1 capsule (500 mg) by mouth 3 times daily  Qty: 18 capsule, Refills: 0    Associated Diagnoses: Recurrent UTI; Urinary tract infection associated with indwelling urethral catheter, subsequent encounter         CONTINUE these medications which have CHANGED    Details   aspirin 81 MG chewable tablet Take 1 tablet (81 mg) by mouth daily Starting 1 week after your kidney biopsy  Qty: 90 tablet, Refills: 3    Associated Diagnoses: Pulmonary embolism and infarction (H); Diabetes mellitus with background retinopathy (H)      gabapentin (NEURONTIN) 300 MG capsule Take 1-2 capsules (300-600 mg) by mouth 3 times daily as needed For pain  Qty: 180 capsule, Refills: 3    Associated Diagnoses: Diabetic polyneuropathy associated with type 2 diabetes mellitus (H)      OXcarbazepine (TRILEPTAL) 300 MG tablet Take 1 tablet (300 mg) by mouth 2 times daily as needed For pain  Qty: 180 tablet, Refills: 3    Associated Diagnoses: Diabetic polyneuropathy associated with diabetes mellitus due to underlying condition (H)      insulin glargine (LANTUS SOLOSTAR) 100 UNIT/ML injection Inject 16 units under the skin daily.  Qty: 15 mL, Refills: 3    Associated Diagnoses: Diabetes mellitus with background retinopathy (H)      labetalol (NORMODYNE) 300 MG tablet Take 1 tablet (300 mg) by mouth 2 times daily  Qty: 120 tablet, Refills: 3    Associated Diagnoses: Benign essential hypertension; Kidney replaced by transplant      predniSONE (DELTASONE) 5 MG tablet Take 1 tablet (5 mg) by mouth daily  Qty: 90 tablet, Refills: 3    Associated Diagnoses: Kidney replaced by transplant      tacrolimus (PROGRAF - GENERIC EQUIVALENT) 0.5 MG capsule Take 1 capsule (0.5 mg) by mouth 2 times daily  Qty: 180 capsule, Refills: 3     Associated Diagnoses: Kidney transplant recipient      tacrolimus (PROGRAF - GENERIC EQUIVALENT) 1 MG capsule Take 1 capsule (1 mg) by mouth 2 times daily  Qty: 180 capsule, Refills: 3    Associated Diagnoses: Kidney replaced by transplant         CONTINUE these medications which have NOT CHANGED    Details   insulin aspart (NOVOLOG FLEXPEN) 100 UNIT/ML injection Inject 10 units before meals BID. At bedtime use following sliding scale:   150-200 5u  201-250 7u  251-300 9u  301-350 11u  351+ 13u  Qty: 15 mL, Refills: 0    Associated Diagnoses: Diabetes mellitus with background retinopathy (H)      cyanocobalamin (VITAMIN B12) 1000 MCG/ML injection Inject 1 mL (1,000 mcg) into the muscle every 30 days  Qty: 3 mL, Refills: 3    Associated Diagnoses: Iron deficiency anemia, unspecified iron deficiency anemia type      meclizine (ANTIVERT) 12.5 MG tablet Take 1 tablet (12.5 mg) by mouth 3 times daily  Qty: 90 tablet, Refills: 1    Associated Diagnoses: Disorientation      diphenoxylate-atropine (LOMOTIL) 2.5-0.025 MG per tablet Take 1 tablet by mouth 4 times daily as needed for diarrhea  Qty: 50 tablet, Refills: 3    Associated Diagnoses: Diarrhea of presumed infectious origin      TraMADol HCl 50 MG TBDP Take 50 mg by mouth 3 times daily as needed  Qty: 90 tablet, Refills: 3    Associated Diagnoses: Chronic shoulder pain, unspecified laterality      blood glucose monitoring (ACCU-CHEK COMPACT PLUS) test strip Use to test blood sugar 3 times daily or as directed.  Qty: 100 strip, Refills: 11    Associated Diagnoses: Type 2 diabetes mellitus with diabetic polyneuropathy, with long-term current use of insulin (H)      levothyroxine (SYNTHROID) 50 MCG tablet Take 1 tablet (50 mcg) by mouth daily  Qty: 90 tablet, Refills: 3    Associated Diagnoses: Other specified hypothyroidism      glucagon (GLUCAGON EMERGENCY) 1 MG injection Inject 1 mg into the muscle once  Qty: 1 each, Refills: 11    Associated Diagnoses: Hypoglycemia  "     clotrimazole (LOTRIMIN) 1 % cream Apply topically 2 times daily Apply to buttocks twice a day until rash resolved.  Qty: 40 g, Refills: 0      loratadine (CLARITIN) 10 MG tablet Take 1 tablet (10 mg) by mouth daily  Qty: 90 tablet, Refills: 3    Associated Diagnoses: Acute nasopharyngitis      insulin pen needle 30G X 8 MM Use as directed with Lantus  Qty: 100 each, Refills: 3    Associated Diagnoses: Type 2 diabetes mellitus with diabetic nephropathy (H)      pravastatin (PRAVACHOL) 10 MG tablet Take 1 tablet (10 mg) by mouth daily  Qty: 90 tablet, Refills: 3    Associated Diagnoses: Hyperlipidemia LDL goal <100      ondansetron (ZOFRAN ODT) 4 MG disintegrating tablet Take 1-2 tablets (4-8 mg) by mouth every 8 hours as needed for nausea  Qty: 20 tablet, Refills: 5    Associated Diagnoses: Nausea      simethicone (MYLICON) 125 MG CHEW Take 1 tablet (125 mg) by mouth as needed for intestinal gas  Qty: 120 tablet      Lactobacillus Rhamnosus, GG, (CULTURELL) capsule Take 1 capsule by mouth 2 times daily  Qty: 60 capsule, Refills: 11    Associated Diagnoses: Diarrhea      Nystatin (NYSTOP) 221255 UNIT/GM POWD Externally apply 1 g topically 3 times daily as needed  Qty: 60 g, Refills: 6    Associated Diagnoses: Fungal infection      alcohol swab prep pads Use to swab area of injection/yvon and vials as directed.  Qty: 1 Box, Refills: 12    Associated Diagnoses: Type 2 diabetes, HbA1C goal < 8% (H)      Cranberry 400 MG TABS Take 400 mg by mouth 2 times daily      famotidine (PEPCID) 20 MG tablet Take 1 tablet (20 mg) by mouth 2 times daily  Qty: 60 tablet, Refills: 1      ACETAMINOPHEN PO Take 325-650 mg by mouth every 4 hours as needed.      !! order for DME Equipment being ordered: Silver Impregnated catheters  HX of frequent UTI  Qty: 12 each, Refills: 1    Associated Diagnoses: Recurrent UTI      syringe/needle, disp, (B-D SYRINGE/NEEDLE) 22G X 1-1/2\" 3 ML MISC Inject 1 Syringe into the muscle every 30 " days  Qty: 50 each, Refills: 0    Associated Diagnoses: Iron deficiency anemia, unspecified iron deficiency anemia type      !! order for DME Equipment being ordered: Ahuja FX CPAP interface (nasal pillows), size XS.  Qty: 1 each, Refills: 1    Associated Diagnoses: Obstructive sleep apnea syndrome      !! order for DME Equipment being ordered: Blue Chux  Please send to [facility]  Qty: 100 pad, Refills: 3    Associated Diagnoses: Fecal incontinence      !! ORDER FOR DME Equipment being ordered: Johnson Catheters - 18 [place]  Qty: 3 catheter, Refills: PRN    Associated Diagnoses: Johnson catheter status       !! - Potential duplicate medications found. Please discuss with provider.      STOP taking these medications       sirolimus (RAPAMUNE - GENERIC EQUIVALENT) 0.5 MG tablet Comments:   Reason for Stopping:         cloNIDine (CATAPRES) 0.1 MG tablet Comments:   Reason for Stopping:         ACE NOT PRESCRIBED, INTENTIONAL, Comments:   Reason for Stopping:                    Discharge Instructions and Follow-Up:     Discharge Procedure Orders  Basic metabolic panel   Standing Status: Future  Standing Exp. Date: 06/27/17     Tacrolimus level   Standing Status: Future  Standing Exp. Date: 06/27/17   Order Comments: Must be a trough level.     Home care nursing referral   Referral Type: Home Health Therapies & Aides     Medication Therapy Management Referral   Referral Type: Med Therapy Management     Reason for your hospital stay   Order Comments: You were admitted to the hospital for a urinary tract infection.  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     Adult [facility] Follow-up and recommended labs and tests   Order Comments: Follow up with primary care provider, Marivel Barcenas NP, within 7 days to evaluate medication change, for hospital follow- up and regarding new diagnosis.  The following labs/tests are recommended: Follow up  BMP and Tacrolimus level that will be drawn on Monday 5/1.  Appointment already scheduled for Wednesday 5/3/17 at 2 pm, home visit.     Transplant Nephrology Clinic to call in 1 week to follow up on blood pressure and lab results.       Appointments on [place] and/or [facility] (with [facility] or [facility] provider or service). Call [phone] if you haven't heard regarding these appointments within 7 days of discharge.     Activity   Order Comments: Your activity upon discharge: activity as tolerated   Order Specific Question Answer Comments   Is discharge order? Yes      When to contact your care team   Order Comments: Call your primary doctor if you have any of the following: temperature greater than 100.5,  increased shortness of breath, increased pain or worsening confusion.     Tubes and drains   Order Comments: You are going home with the following tubes or drains: silver impregnated rico catheter.  Replace with silver impregnated rico catheters every 2-3 weeks.     Discharge Instructions   Order Comments: Check your blood pressure 2 times daily, an hour after taking labetalol.   In the morning, if blood pressure is > 160, take the amlodipine medication.   Record blood pressure values and  If needing to take amlodipine in the morning.  Renal clinic will call in 1 week to follow up on blood pressure values.     Full Code     Diet   Order Comments: Follow this diet upon discharge: Orders Placed This Encounter     Room Service     Snacks/Supplements Adult: Glucerna (Adult); Between Meals     Regular Diet Adult - Low potassium   Order Specific Question Answer Comments   Is discharge order? Yes                  Discharge Disposition:   Home with  and Home Cares         Condition on Discharge:   Discharge condition: Stable   Code status on discharge: Full Code        Date of service: 4/28/2017    The patient was discussed with Dr. Webber.    Miguelito Green MD  Internal Medicine-Pediatrics,  PGY-4  [phone]

## 2017-04-28 NOTE — PLAN OF CARE
Problem: Goal Outcome Summary  Goal: Goal Outcome Summary  Outcome: No Change  Alert and oriented. Vs stable. Verbalizes pain on right abdomen biopsy site that worsens with movement and coughing. Refused any pain meds. Offered pillow to splint when coughing to minimize pain, effective per pt. Stayed awake until the middle of the night watching TV then fell asleep thereafter and stayed asleep. Biopsy site CDI. Repositioned q2h and prn. No BM overnight. Johnson intact and draining. . IV antibiotics administered via PIV then set to TKO. Plans to d/c to day following biopsy results. Continue to monitor.

## 2017-04-28 NOTE — PLAN OF CARE
Problem: Goal Outcome Summary  Goal: Goal Outcome Summary  Outcome: Adequate for Discharge Date Met:  04/28/17  Johnson catheter changed. Patient discharged from unit in stable condition at 1652 via medical transport. Discharge instructions given to patient and family to obtain home meds from [facility] pharmacy later. Personal belongings taken by patient.

## 2017-04-28 NOTE — PROGRESS NOTES
Nephrology Progress Note  04/28/2017         Assessment & Recommendations:   1. LDKT - baseline Cr ~ 0.6-0.8, but has been running higher 0.9-1.1 range after several recent ROB episodes, presented again with ROB in the setting of dehydration and UTI. She was found with nephrotic range proteinuria. 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   -her Cr is above her baseline.   -c/w pain on the Tx side.   -restart ASA in one week   2. 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 Will discontinue her sirolimus, increased Tacrolimus to 2 mg BID. Goal tacrolimus will be 4-6, more in the 6th. Increased Prednisone to 5 mg QD.   3. HTN/Volume status -will stop Clonidine today. Start Losartan at 25 mg QD, c/w Labetalol 300 mg BID. C/w Amlodipine 5 mg PRN in PM is SBP >160. Please schedule phone call f/u with renal clinic for her BP on Friday. She should check her BP at home in the AM and PM and record it. The plan will be to increase Losartan or Labetolol and stop Amlodipine.   4. UTI - improving on antibiotics. Uc from 4/24/17 negative to date.   5. Anemia in chronic renal disease: Recently iron replete.   6. Hypercalcemia - decreased, now normal serum calcium after starting cinacalcet.  Underlying etiology likely due to prolonged immobilization, as well as hyperparathyroidism.  No changes.  7. EBV viremia - will recheck EBV PCR in next week   8. DM - management per primary team.      Recommendations were communicated to primary team via this note.    Rochelle Malhotra MD    Interval History:   In the last 24 hours Gem Jade's Bx result showed chronic changes, and FSGS. Cr remained stable, elevated. Has been having good Uout. BP close to her goal, with increasing Labetalol and decreasing Clonidine. MS much improved. She was c/w graft pain today,no hematuria.     Review of Systems:   4 point ROS was  "obtained and negative, except as noted in Interval History.    Physical Exam:   I/O last 3 completed shifts:  In: 240 [P.O.:240]  Out: 1500 [Urine:1500]   /50 (BP Location: Right arm)  Pulse 68  Temp 97.8  F (36.6  C) (Oral)  Resp 16  Ht 1.702 m (5' 7.01\")  Wt 90.5 kg (199 lb 8 oz)  SpO2 98%  BMI 31.24 kg/m2     GENERAL APPEARANCE: alert, NAD  HENT: mouth without ulcers or lesions  PULM: lungs clear to auscultation bilaterally, equal air movement  CV: regular rhythm, normal rate     - no LE edema bilaterally  GI: soft, nontender, obese, bowel sounds are positive  INTEGUMENT: no rash  TX KIDNEY: normal    Labs:   All labs reviewed by me  Electrolytes/Renal -   Recent Labs   Lab Test  04/28/17   0719  04/27/17   0855  04/26/17   0638   04/24/17   0611   04/22/17   0942   NA  142  139  138   < >  139   < >  136   POTASSIUM  3.6  3.8  3.8   < >  3.2*   < >  3.5   CHLORIDE  108  107  106   < >  105   < >  103   CO2  23  24  22   < >  25   < >  22   BUN  22  21  24   < >  26   < >  25   CR  1.71*  1.75*  1.71*   < >  1.62*   < >  1.16*   GLC  178*  212*  123*   < >  201*   < >  222*   ROGER  7.6*  7.9*  8.2*   < >  8.4*   < >  9.4   MAG   --   1.6   --    --   1.6   --   1.7   PHOS  3.1  2.8  3.1   < >  3.3   < >  2.8    < > = values in this interval not displayed.       CBC -   Recent Labs   Lab Test  04/28/17   0719  04/27/17   1542  04/27/17   0855  04/26/17   2338   WBC  5.0   --   5.0  5.0   HGB  9.4*  10.0*  9.5*  9.6*   PLT  98*   --   105*  105*       LFTs -   Recent Labs   Lab Test  04/26/17   0638  04/22/17   0942  04/19/17   0005   ALKPHOS  72  100  101   BILITOTAL  0.6  0.7  0.2   ALT  23  42  44   AST  20  24  24   PROTTOTAL  7.3  7.9  7.8   ALBUMIN  3.0*  3.1*  3.1*       Iron Panel -   Recent Labs   Lab Test  02/17/17   2358  09/04/16   0815  08/07/16   0750   IRON  50  124  12*   IRONSAT  29  97*  7*   GIANA  1069*  2676*  645*       Current Medications:    losartan  25 mg Oral Daily     [START ON " 4/29/2017] amLODIPine  5 mg Oral Daily     [START ON 4/29/2017] predniSONE  5 mg Oral Daily     tacrolimus  2 mg Oral BID IS     labetalol  300 mg Oral BID     ampicillin  2 g Intravenous Q8H     famotidine  20 mg Oral Daily     cinacalcet  30 mg Oral Daily     insulin glargine  12 Units Subcutaneous QAM AC     levothyroxine  50 mcg Oral Daily     pravastatin  10 mg Oral Daily     insulin aspart  1-7 Units Subcutaneous TID AC     insulin aspart  1-5 Units Subcutaneous At Bedtime        Rochelle Malhotra MD     Attestation:  This patient has been seen and evaluated by me, Ross Elizabeth MD.  I have reviewed the note and agree with plan of care as documented by the fellow.

## 2017-04-28 NOTE — DISCHARGE INSTRUCTIONS
Check BP twice a day, an hour after taking labetalol.    In the morning, if her Systolic BP is > 160, give Amlodipine 5 mg tab.    Please chart her blood pressures twice a day and note if she had received amlodipine that day.    Notify Sanjana from renal clinic with the results of 1 week of blood pressure values and she will determine if amlodipine needs to be scheduled.

## 2017-04-28 NOTE — PLAN OF CARE
Problem: Goal Outcome Summary  Goal: Goal Outcome Summary  Outcome: Improving  D/I Uneventful shift,  remains  A&O, VSS. Johnson intact and drained 1150  over shift. IV antibiotics given in PIV.  Right lower abdomen bandaide d & I from renal bx.  Tolerated small amount of dinner.  & 244. Insulin given per MAR. Johnson cares completed.  Continue to monitor per POC.

## 2017-04-28 NOTE — PLAN OF CARE
Problem: Goal Outcome Summary  Goal: Goal Outcome Summary  Patient alert and oriented, pleasant and cooperative with cares, Able to verbalize needs and use call light appropriately. Had recent kidney biopsy and dressing on insertion site CDI. Johnson catheter in place and patent. Seen and examined by primary and nephrology Team. Was sent to radiology for US of kidneys and came back at 1400 via bed in stable condition. For DC to home this afternoon. Will replace Johnson cath.

## 2017-04-28 NOTE — PROGRESS NOTES
Care Coordinator Progress Note     Admission Date/Time:  4/18/2017  Attending MD:  Prudencio Linares MD     Data  Chart reviewed, discussed with interdisciplinary team.   Patient was admitted for:    Altered mental status, unspecified altered mental status type  Urinary tract infection, site unspecified  Kidney replaced by transplant  Recurrent UTI.    Concerns with insurance coverage for discharge needs: .  Current Living Situation: Patient lives with spouse.  Support System: Supportive and Involved  Services Involved: DME and Home Care  Transportation: Agency transportation. Stretcher transport  Barriers to Discharge: chronically ill, cognitively impaired and dependent with mobility/activities of daily living    Coordination of Care and Referrals: Provided patient/family with options for none at this time..        Assessment  Spoke with patient at bedside.  Pt is a 71 year old female with PMH significant for kidney transplant and chronic indwelling rico with recurrent UTIs who was admitted with altered mental status.  Patient is slightly confused today, but answering questions.  Upon chart review, patient has a lift device and wheelchair at home, however [facility] report patient has been bed bound and does not get up.  Patient is currently receiving [facility] for RN and HHA.  Resumption orders placed.  Will need stretcher transport upon discharge.  [facility] stretcher transport set up for 1630 today and patient was notified, states she will tell her . Patient is requesting a dose of meclizine for the ride.  Will notify bedside nurse.    _______________________  [facility]  Phone  [phone]  Fax  [phone]  ______________________    [facility] Stretcher Transport: [phone]         Plan  Anticipated Discharge Date:  today  Anticipated Discharge Plan:  Home with home care    Bailey Aguilar RN, BSN  Care Coordinator [facility] [address]  Pager: [phone]  Phone: [phone]

## 2017-04-30 ENCOUNTER — TELEPHONE (OUTPATIENT)
Dept: NURSING | Facility: CLINIC | Age: 72
End: 2017-04-30

## 2017-04-30 NOTE — TELEPHONE ENCOUNTER
Call Type: Triage Call    Presenting Problem: [facility]  nurse calling with a request to hold  labetalol tonight as bp is 99/32.  I asked caller to speak to the on  call directly as she doesn't need to use F[facility]. She agreed.  Triage Note:  Guideline Title: No Guideline - Advice Per Reference (Adult)  Recommended Disposition: Call Provider Immediately  Original Inclination: Wanted to speak with a nurse  Override Disposition:  Intended Action: Follow advice given  Physician Contacted: No  CALL PROVIDER IMMEDIATELY ?  YES  SEE ED IMMEDIATELY ? NO  ACTIVATE  ? NO  Physician Instructions:  Care Advice:

## 2017-05-01 ENCOUNTER — CARE COORDINATION (OUTPATIENT)
Dept: CARE COORDINATION | Facility: CLINIC | Age: 72
End: 2017-05-01

## 2017-05-01 ENCOUNTER — TELEPHONE (OUTPATIENT)
Dept: FAMILY MEDICINE | Facility: CLINIC | Age: 72
End: 2017-05-01

## 2017-05-01 DIAGNOSIS — Z94.0 KIDNEY TRANSPLANT RECIPIENT: ICD-10-CM

## 2017-05-01 DIAGNOSIS — D84.9 IMMUNOSUPPRESSED STATUS (H): ICD-10-CM

## 2017-05-01 DIAGNOSIS — Z79.60 LONG-TERM USE OF IMMUNOSUPPRESSANT MEDICATION: ICD-10-CM

## 2017-05-01 LAB
COPATH REPORT: NORMAL
ERYTHROCYTE [DISTWIDTH] IN BLOOD BY AUTOMATED COUNT: 14.5 % (ref 10–15)
HCT VFR BLD AUTO: 30.6 % (ref 35–47)
HGB BLD-MCNC: 9.5 G/DL (ref 11.7–15.7)
MCH RBC QN AUTO: 29.1 PG (ref 26.5–33)
MCHC RBC AUTO-ENTMCNC: 31 G/DL (ref 31.5–36.5)
MCV RBC AUTO: 94 FL (ref 78–100)
PLATELET # BLD AUTO: 108 10E9/L (ref 150–450)
RBC # BLD AUTO: 3.26 10E12/L (ref 3.8–5.2)
WBC # BLD AUTO: 4.8 10E9/L (ref 4–11)

## 2017-05-01 PROCEDURE — 36415 COLL VENOUS BLD VENIPUNCTURE: CPT | Performed by: FAMILY MEDICINE

## 2017-05-01 PROCEDURE — 80048 BASIC METABOLIC PNL TOTAL CA: CPT | Performed by: FAMILY MEDICINE

## 2017-05-01 PROCEDURE — 80197 ASSAY OF TACROLIMUS: CPT | Performed by: FAMILY MEDICINE

## 2017-05-01 PROCEDURE — 80195 ASSAY OF SIROLIMUS: CPT | Performed by: FAMILY MEDICINE

## 2017-05-01 PROCEDURE — 85027 COMPLETE CBC AUTOMATED: CPT | Performed by: FAMILY MEDICINE

## 2017-05-01 NOTE — TELEPHONE ENCOUNTER
TC from [facility] Sunday night, spoke with [facility] nurse about medication error, several med changes were made in the hospital and the  was confused about some of the changes, see changes below:    Nurse arrived and was reviewing meds and found out that the  did not know that the clonidine was stopped and that the norvasc was only as needed, therefore he gave her all of her BP meds, BP at that time was 99/32,     P: Hold Labetalol tonight, and push fluids, recheck BP in th morning and if systolic lower than 100 or diastolic lower than 60 than hold labetalol and losartan, repeat BP at noon and if WNL ok to give losartan and then evening labetalol.  Discontinue Clonidine and hold Norvasc unless systolic greater than 160 one hour after taking morning labetalol    Nurse verbalizes plan.  I will be out to visit the patient 5/3, verbal order given for skilled nursing,HHA and PT    Klaudia Barcenas FNP      START taking these medications     Details   losartan (COZAAR) 25 MG tablet Take 1 tablet (25 mg) by mouth daily  Qty: 90 tablet, Refills: 3     Associated Diagnoses: Kidney replaced by transplant; Benign essential hypertension       amLODIPine (NORVASC) 5 MG tablet Take 1 tablet (5 mg) by mouth daily as needed For systolic blood pressure > 160 when checked 1 hour after taking labetalol.  Qty: 90 tablet, Refills: 3     Associated Diagnoses: Kidney replaced by transplant; Benign essential hypertension       cinacalcet (SENSIPAR) 30 MG tablet Take 1 tablet (30 mg) by mouth daily  Qty: 60 tablet, Refills: 3     Associated Diagnoses: Kidney replaced by transplant; Hypercalcemia       amoxicillin (AMOXIL) 500 MG capsule Take 1 capsule (500 mg) by mouth 3 times daily  Qty: 18 capsule, Refills: 0     Associated Diagnoses: Recurrent UTI; Urinary tract infection associated with indwelling urethral catheter, subsequent encounter     CONTINUE these medications which have CHANGED     Details   aspirin 81 MG chewable tablet  Take 1 tablet (81 mg) by mouth daily Starting 1 week after your kidney biopsy  Qty: 90 tablet, Refills: 3     Associated Diagnoses: Pulmonary embolism and infarction (H); Diabetes mellitus with background retinopathy (H)       gabapentin (NEURONTIN) 300 MG capsule Take 1-2 capsules (300-600 mg) by mouth 3 times daily as needed For pain  Qty: 180 capsule, Refills: 3     Associated Diagnoses: Diabetic polyneuropathy associated with type 2 diabetes mellitus (H)       OXcarbazepine (TRILEPTAL) 300 MG tablet Take 1 tablet (300 mg) by mouth 2 times daily as needed For pain  Qty: 180 tablet, Refills: 3     Associated Diagnoses: Diabetic polyneuropathy associated with diabetes mellitus due to underlying condition (H)       insulin glargine (LANTUS SOLOSTAR) 100 UNIT/ML injection Inject 16 units under the skin daily.  Qty: 15 mL, Refills: 3     Associated Diagnoses: Diabetes mellitus with background retinopathy (H)       labetalol (NORMODYNE) 300 MG tablet Take 1 tablet (300 mg) by mouth 2 times daily  Qty: 120 tablet, Refills: 3     Associated Diagnoses: Benign essential hypertension; Kidney replaced by transplant       predniSONE (DELTASONE) 5 MG tablet Take 1 tablet (5 mg) by mouth daily  Qty: 90 tablet, Refills: 3     Associated Diagnoses: Kidney replaced by transplant       tacrolimus (PROGRAF - GENERIC EQUIVALENT) 0.5 MG capsule Take 1 capsule (0.5 mg) by mouth 2 times daily  Qty: 180 capsule, Refills: 3     Associated Diagnoses: Kidney transplant recipient       tacrolimus (PROGRAF - GENERIC EQUIVALENT) 1 MG capsule Take 1 capsule (1 mg) by mouth 2 times daily  Qty: 180 capsule, Refills: 3     Associated Diagnoses: Kidney replaced by transplant         STOP taking these medications         sirolimus (RAPAMUNE - GENERIC EQUIVALENT) 0.5 MG tablet Comments:   Reason for Stopping:            cloNIDine (CATAPRES) 0.1 MG tablet

## 2017-05-01 NOTE — TELEPHONE ENCOUNTER
Call from SEKOU Aguila [facility] with update on patient.  Med reconciliation completed by homecare nurse.  BP today is 120/45.      Wendie Harris RN

## 2017-05-01 NOTE — PROGRESS NOTES
"[facility]  \"Hello, my name is Cristina Herrera , and I am calling from the [facility].  I want to check in and see how you are doing, after leaving the hospital.  You may also receive a call from your Care Coordinator (care team), but I want to make sure you don t have any urgent needs.  I have a couple questions to review with you:     Post-Discharge Outreach                                                    Gem Jade is a 71 year old female     Follow-up Appointments           Adult [facility] Follow-up and recommended labs and tests       Follow up with primary care provider, Marivel Barcenas, ALISON, within 7 days to evaluate medication change, for hospital follow- up and regarding new diagnosis. The following labs/tests are recommended: Follow up BMP and Tacrolimus level that will be drawn on Monday 5/1.  Appointment already scheduled for Wednesday 5/3/17 at 2 pm, home visit.      [facility] to call in 1 week to follow up on blood pressure and lab results.      Appointments on [place] and/or [facility] (with [facility] or [facility] provider or service). Call [phone] if you haven't heard regarding these appointments within 7 days of discharge.                       Your next 10 appointments already scheduled            May 03, 2017 2:00 PM CDT   Return Visit with ANTHONY Antonio CNP   [facility] ([facility])     [address] [place] [id number] [phone]                  May 24, 2017 2:00 PM CDT   Office Visit with Conchita Toledo [facility] ([facility])     [address] [place] [address]  [address] [place] [address] [phone]              Care Team:    Patient Care Team       Relationship Specialty Notifications Start End    Marivel Barcenas APRN CNP PCP - General Family Practice  5/24/13 " "    Phone: [phone] Fax: [phone]         [facility] [address] [facility] [address]    Aditi Flores, RN Registered Nurse  Admissions 3/18/14     Monico Heck MD MD Transplant  10/6/15     Comment:  nephrology - [phone]    Phone: [phone] Fax: [phone]          [facility] [address]  [facility] [address]    [facility] Care Coordinator  Admissions 2/17/16     Phone: [phone] Fax: [phone]                Transition of Care Review                                                      Patient was contacted by an RN for post DC follow up so no duplicate post DC follow up call will be made          Daniel Harris, SEKOU        ED/Discharge Protocol     \"Hi, my name is Daniel Harris, a registered nurse, and I am calling on behalf of Klaudia Barcenas's office at [place]. I am calling to follow up and see how things are going for you after your recent visit.\"     \"I see that you were in the (ER/UC/IP) on 4/19/17 to 4/28/17.   How are you doing now that you are home?\" Doing well     Is patient experiencing symptoms that may require a hospital visit?  None     Discharge Instructions     \"Let's review your discharge instructions. What is/are the follow-up recommendations?  Pt. Response: Homecare came in yesterday and are coming again today. PCP visit is Wednesday this week.     \"Were you instructed to make a follow-up appointment?\"  Pt. Response: Yes. Has appointment been made? Yes      \"When you see the provider, I would recommend that you bring your discharge instructions with you.     Medications     \"How many new medications are you on since your hospitalization/ED visit?\"   2 or more - Epic MTM referral needed  \"How many of your current medicines changed (dose, timing, name, etc.) while you were in the hospital/ED visit?\"  2 or more - Epic MTM referral needed  \"Do you have questions about your medications?\"  Yes (and cannot be easily " "answered) - Epic MTM referral needed. Will route message to MT. Patient states there were many med changes so would like to review with her sooner than 5/24/17 when already scheduled.  \"Were you newly diagnosed with heart failure, COPD, diabetes or did you have a heart attack?\"  No  For patients on insulin: \"Did you start on insulin in the hospital or did you have your insulin dose changed?\"  No     Medication reconciliation completed? No, due to Homecare has been going over the meds. Patient requesting MTM to call her     Was MTM referral placed (*Make sure to put transitions as reason for referral)?  Yes - \"The Medication Therapy  will call you within the next few days to schedule an appointment with an [place] pharmacist to discuss your medicines and make sure they are working as well as they possibly can for you. This visit can be done in a [place] clinic or on the phone and is at no charge to you.\"     Call Summary     \"Do you have any questions or concerns about your condition or care plan at the moment?\"   No  Triage nurse advice given: Call with any questions/concerns     Patient was in ER 8 in the past year (assess appropriateness of ER visits.)      \"If you have questions or things don't continue to improve, we encourage you contact us through the main clinic number, [phone]. Even if the clinic is not open, triage nurses are available 24/7 to help you.      We would like you to know that our clinic has extended hours (provide information). We also have urgent care (provide details on closest location and hours/contact info)\"        \"Thank you for your time and take care!\"        Patient states she is doing well. She states she has been sleeping much of the time since returning home. She is also requesting MTM to call her as there were several med changes made during her hospital stay. [facility] is making a second admission visit today to go over the meds again with her.     Routing to " provider and MTM as FYI. Labs being drawn today.     Wendie Harris, SEKOU               Next 5 appointments (look out 90 days)     May 03, 2017  2:00 PM CDT   Return Visit with ANTHONY Antonio CNP   [facility] ([facility])    [address] [place] [address] [address]  [address]  [facility] [address]   [phone]            May 24, 2017  2:00 PM CDT   Office Visit with Conchita Toledo [facility] ([facility])    [address] [place] [address]  [address] [place] [address]   [phone]            Jun 07, 2017  2:00 PM CDT   Return Visit with ANTHONY Antonio CNP   [facility] ([facility])    [address] [place] [address]  [address]  [facility] [address]   [phone]                      Plan                                                      Thanks for your time.  Your Care Coordinator may follow-up within the next couple days.  In the meantime if you have questions, concerns or problems call your care team.        Cristina Herrera

## 2017-05-01 NOTE — TELEPHONE ENCOUNTER
"ED/Discharge Protocol    \"Hi, my name is Danielprincess Harris, a registered nurse, and I am calling on behalf of Klaudia Barcenas's office at [place].  I am calling to follow up and see how things are going for you after your recent visit.\"    \"I see that you were in the (ER/UC/IP) on 4/19/17 to 4/28/17.    How are you doing now that you are home?\" Doing well    Is patient experiencing symptoms that may require a hospital visit?  None    Discharge Instructions    \"Let's review your discharge instructions.  What is/are the follow-up recommendations?  Pt. Response: Homecare came in yesterday and are coming again today.  PCP visit is Wednesday this week.    \"Were you instructed to make a follow-up appointment?\"  Pt. Response: Yes.  Has appointment been made?   Yes      \"When you see the provider, I would recommend that you bring your discharge instructions with you.    Medications    \"How many new medications are you on since your hospitalization/ED visit?\"    2 or more - [facility] MTM referral needed  \"How many of your current medicines changed (dose, timing, name, etc.) while you were in the hospital/ED visit?\"   2 or more - [facility] MTM referral needed  \"Do you have questions about your medications?\"   Yes (and cannot be easily answered) - Epic MTM referral needed.  Will route message to MTM.  Patient states there were many med changes so would like to review with her sooner than 5/24/17 when already scheduled.  \"Were you newly diagnosed with heart failure, COPD, diabetes or did you have a heart attack?\"   No  For patients on insulin: \"Did you start on insulin in the hospital or did you have your insulin dose changed?\"   No    Medication reconciliation completed? No, due to Homecare has been going over the meds.  Patient requesting MTM to call her    Was MTM referral placed (*Make sure to put transitions as reason for referral)?   Yes - \"The Medication Therapy  will call you within the next few days to " "schedule an appointment with an [facility] pharmacist to discuss your medicines and make sure they are working as well as they possibly can for you. This visit can be done in a [place] clinic or on the phone and is at no charge to you.\"    Call Summary    \"Do you have any questions or concerns about your condition or care plan at the moment?\"    No  Triage nurse advice given: Call with any questions/concerns    Patient was in ER 8 in the past year (assess appropriateness of ER visits.)      \"If you have questions or things don't continue to improve, we encourage you contact us through the main clinic number,  [phone].  Even if the clinic is not open, triage nurses are available 24/7 to help you.     We would like you to know that our clinic has extended hours (provide information).  We also have urgent care (provide details on closest location and hours/contact info)\"      \"Thank you for your time and take care!\"      Patient states she is doing well.  She states she has been sleeping much of the time since returning home.  She is also requesting MTM to call her as there were several med changes made during her hospital stay.  [facility] is making a second admission visit today to go over the meds again with her.    Routing to provider and MTM as FYI.  Labs being drawn today.    Wendie Harris RN    "

## 2017-05-02 ENCOUNTER — TELEPHONE (OUTPATIENT)
Dept: FAMILY MEDICINE | Facility: CLINIC | Age: 72
End: 2017-05-02

## 2017-05-02 LAB
ANION GAP SERPL CALCULATED.3IONS-SCNC: 10 MMOL/L (ref 3–14)
BUN SERPL-MCNC: 32 MG/DL (ref 7–30)
CALCIUM SERPL-MCNC: 7.5 MG/DL (ref 8.5–10.1)
CHLORIDE SERPL-SCNC: 106 MMOL/L (ref 94–109)
CO2 SERPL-SCNC: 23 MMOL/L (ref 20–32)
CREAT SERPL-MCNC: 2.19 MG/DL (ref 0.52–1.04)
GFR SERPL CREATININE-BSD FRML MDRD: 22 ML/MIN/1.7M2
GLUCOSE SERPL-MCNC: 241 MG/DL (ref 70–99)
POTASSIUM SERPL-SCNC: 3.7 MMOL/L (ref 3.4–5.3)
SIROLIMUS BLD-MCNC: 3.9 UG/L (ref 5–15)
SODIUM SERPL-SCNC: 139 MMOL/L (ref 133–144)
TACROLIMUS BLD-MCNC: 5.1 UG/L (ref 5–15)
TME LAST DOSE: 1200 H
TME LAST DOSE: 1200 H

## 2017-05-02 PROCEDURE — G0179 MD RECERTIFICATION HHA PT: HCPCS

## 2017-05-02 NOTE — TELEPHONE ENCOUNTER
Bhavana with [facility] called from visit at patient's home.  Patient's BP is 93/37 laying down.  Patient reported diastolic BP running in the 30s and 40s over the last couple days.  She said they did not change her BP meds in the hospital, even though she told them that she was running low BP.    No other symptoms, except when she sits up she feels dizzy.    This morning BP was 105/35.    Patient reports not drinking as much water as she should today.  Today she ate a bagel.    Advised Bhavana to encourage patient to drink more fluids.  Bhavana and patient are aware that Klaudia will see tomorrow.    No need to call back unless there are other questions.    Call back to Bhavana is [phone].    Routing to RNs and PCP as staff is [place] on retreat.

## 2017-05-03 ENCOUNTER — OFFICE VISIT (OUTPATIENT)
Dept: FAMILY MEDICINE | Facility: CLINIC | Age: 72
End: 2017-05-03
Payer: COMMERCIAL

## 2017-05-03 DIAGNOSIS — Z94.0 KIDNEY REPLACED BY TRANSPLANT: ICD-10-CM

## 2017-05-03 DIAGNOSIS — Z09 HOSPITAL DISCHARGE FOLLOW-UP: Primary | ICD-10-CM

## 2017-05-03 DIAGNOSIS — R41.82 ALTERED MENTAL STATUS, UNSPECIFIED ALTERED MENTAL STATUS TYPE: ICD-10-CM

## 2017-05-03 DIAGNOSIS — N39.0 RECURRENT UTI: ICD-10-CM

## 2017-05-03 DIAGNOSIS — E11.42 DIABETIC POLYNEUROPATHY ASSOCIATED WITH TYPE 2 DIABETES MELLITUS (H): ICD-10-CM

## 2017-05-03 PROCEDURE — 99350 HOME/RES VST EST HIGH MDM 60: CPT | Performed by: NURSE PRACTITIONER

## 2017-05-03 NOTE — MR AVS SNAPSHOT
After Visit Summary   5/3/2017    Gem Jade    MRN: [id number]           Patient Information     Date Of Birth          1945        Visit Information        Provider Department      5/3/2017 2:00 PM Marivel Barcenas APRN CNP [facility]        Today's Diagnoses     Hospital discharge follow-up    -  1    Recurrent UTI        Kidney replaced by transplant        Altered mental status, unspecified altered mental status type        Diabetic polyneuropathy associated with type 2 diabetes mellitus (H)          Care Instructions    Today we have made the following med changes:    Decrease your Labetalol to 300mg in the Am and 150mg in the Pm  Increase your lantus back up to 24u as your appetite improves  Hold your losartan 25mg daily  Remember not to take your clonidine  The BP parameter for using her Norvasc is systolic BP is over 160    Please hold your Labetalol if your BP is below 100/60    Please take your BP 2x daily    Continue your other meds as directed     I will see you in one month and our PharmD will see you next week        Follow-ups after your visit        Follow-up notes from your care team     Return in about 4 weeks (around 5/31/2017).      Your next 10 appointments already scheduled     May 09, 2017  2:00 PM CDT   Office Visit with Conchita Toledo [facility] ([facility])    [address] [place] [id number] [phone]           Bring a current list of meds and any records pertaining to this visit.  For Physicals, please bring immunization records and any forms needing to be filled out.  Please arrive 10 minutes early to complete paperwork.            May 24, 2017  2:00 PM CDT   Office Visit with Conchita Toledo [facility] ([facility])    [address] [place] [address]  [id number] [phone]           Bring a current list of meds and any records pertaining to this visit.  For Physicals, please bring immunization records and any forms needing to be filled out.  Please arrive 10 minutes early to complete paperwork.            Jun 07, 2017  2:00 PM CDT   [facility] with ANTHONY Antonio CNP   [facility]    [address] [place] [address]  [address]  [facility] [id number] [phone]              Future tests that were ordered for you today     Open Future Orders        Priority Expected Expires Ordered    Basic metabolic panel Routine 5/8/2017 5/15/2017 5/8/2017    Tacrolimus level Routine 5/8/2017 5/15/2017 5/8/2017            Who to contact     If you have questions or need follow up information about today's clinic visit or your schedule please contact [facility] directly at [phone].  Normal or non-critical lab and imaging results will be communicated to you by MyChart, letter or phone within 4 business days after the clinic has received the results. If you do not hear from us within 7 days, please contact the clinic through [employer] or phone. If you have a critical or abnormal lab result, we will notify you by phone as soon as possible.  Submit refill requests through [employer] or call your pharmacy and they will forward the refill request to us. Please allow 3 business days for your refill to be completed.          Additional Information About Your Visit        Care EveryWhere ID     This is your Care EveryWhere ID. This could be used by other organizations to access your [facility] medical records  [id number]        Your Vitals Were     Pulse Pulse Oximetry                69 99%           Blood Pressure from Last 3 Encounters:   05/08/17 120/50   04/28/17 137/50   04/05/17 110/60    Weight from Last 3 Encounters:   04/27/17 199 lb 8 oz (90.5 kg)   03/15/17 201 lb (91.2 kg)   02/20/17 205 lb 4.8 oz  (93.1 kg)              Today, you had the following     No orders found for display         Today's Medication Changes          These changes are accurate as of: 5/3/17 11:59 PM.  If you have any questions, ask your nurse or doctor.               These medicines have changed or have updated prescriptions.        Dose/Directions    TraMADol HCl 50 MG Tbdp   Indication:  Moderate to Moderately Severe Pain   This may have changed:  when to take this   Used for:  Chronic shoulder pain, unspecified laterality        Dose:  50 mg   Take 50 mg by mouth 3 times daily as needed   Quantity:  90 tablet   Refills:  3                Primary Care Provider Office Phone # Fax #    Marivel Salgado ANTHONY Barcenas [phone]       [facility] [address] [facility] [address]  [facility] [address]        Thank you!     Thank you for choosing [facility]  for your care. Our goal is always to provide you with excellent care. Hearing back from our patients is one way we can continue to improve our services. Please take a few minutes to complete the written survey that you may receive in the mail after your visit with us. Thank you!             Your Updated Medication List - Protect others around you: Learn how to safely use, store and throw away your medicines at www.disposemymeds.org.          This list is accurate as of: 5/3/17 11:59 PM.  Always use your most recent med list.                   Brand Name Dispense Instructions for use    ACETAMINOPHEN PO      Take 325-650 mg by mouth every 4 hours as needed.       alcohol swab prep pads     1 Box    Use to swab area of injection/yvon and vials as directed.       amLODIPine 5 MG tablet    NORVASC    90 tablet    Take 1 tablet (5 mg) by mouth daily as needed For systolic blood pressure > 160 when checked 1 hour after taking labetalol.       amoxicillin 500 MG capsule    AMOXIL    18 capsule    Take 1 capsule (500 mg) by mouth 3 times daily        aspirin 81 MG chewable tablet     90 tablet    Take 1 tablet (81 mg) by mouth daily Starting 1 week after your kidney biopsy       blood glucose monitoring test strip    ACCU-CHEK COMPACT PLUS    100 strip    Use to test blood sugar 3 times daily or as directed.       cinacalcet 30 MG tablet    SENSIPAR    60 tablet    Take 1 tablet (30 mg) by mouth daily       clotrimazole 1 % cream    LOTRIMIN    40 g    Apply topically 2 times daily Apply to buttocks twice a day until rash resolved.       Cranberry 400 MG Tabs      Take 400 mg by mouth 2 times daily       cyanocobalamin 1000 MCG/ML injection    VITAMIN B12    3 mL    Inject 1 mL (1,000 mcg) into the muscle every 30 days       diphenoxylate-atropine 2.5-0.025 MG per tablet    LOMOTIL    50 tablet    Take 1 tablet by mouth 4 times daily as needed for diarrhea       famotidine 20 MG tablet    PEPCID    60 tablet    Take 1 tablet (20 mg) by mouth 2 times daily       gabapentin 300 MG capsule    NEURONTIN    180 capsule    Take 1-2 capsules (300-600 mg) by mouth 3 times daily as needed For pain       GLUCAGON EMERGENCY 1 MG kit   Generic drug:  glucagon     1 each    Inject 1 mg into the muscle once       insulin glargine 100 UNIT/ML injection    LANTUS SOLOSTAR    15 mL    Inject 16 units under the skin daily.       insulin pen needle 30G X 8 MM     100 each    Use as directed with Lantus       labetalol 300 MG tablet    NORMODYNE    120 tablet    Take 1 tablet (300 mg) by mouth 2 times daily       lactobacillus rhamnosus (GG) capsule     60 capsule    Take 1 capsule by mouth 2 times daily       levothyroxine 50 MCG tablet    SYNTHROID    90 tablet    Take 1 tablet (50 mcg) by mouth daily       loratadine 10 MG tablet    CLARITIN    90 tablet    Take 1 tablet (10 mg) by mouth daily       losartan 25 MG tablet    COZAAR    90 tablet    Take 1 tablet (25 mg) by mouth daily       meclizine 12.5 MG tablet    ANTIVERT    90 tablet    Take 1 tablet (12.5 mg) by mouth 3  "times daily       NovoLOG FLEXPEN 100 UNIT/ML injection   Generic drug:  insulin aspart     15 mL    Inject 10 units before meals BID. At bedtime use following sliding scale:  150-200 5u 201-250 7u 251-300 9u 301-350 11u 351+ 13u       NYSTOP 165715 UNIT/GM Powd   Generic drug:  nystatin     60 g    Externally apply 1 g topically 3 times daily as needed       ondansetron 4 MG ODT tab    ZOFRAN ODT    20 tablet    Take 1-2 tablets (4-8 mg) by mouth every 8 hours as needed for nausea       order for DME     3 catheter    Equipment being ordered: Johnson Catheters - 18 french       * order for DME     100 pad    Equipment being ordered: [facility]       * order for DME     1 each    Equipment being ordered: Memeoirs FX CPAP interface (nasal pillows), size XS.       * order for DME     12 each    Equipment being ordered: Silver Impregnated catheters HX of frequent UTI       OXcarbazepine 300 MG tablet    TRILEPTAL    180 tablet    Take 1 tablet (300 mg) by mouth 2 times daily as needed For pain       pravastatin 10 MG tablet    PRAVACHOL    90 tablet    Take 1 tablet (10 mg) by mouth daily       predniSONE 5 MG tablet    DELTASONE    90 tablet    Take 1 tablet (5 mg) by mouth daily       simethicone 125 MG Chew chewable tablet    MYLICON    120 tablet    Take 1 tablet (125 mg) by mouth as needed for intestinal gas       syringe/needle (disp) 22G X 1-1/2\" 3 ML Misc    B-D SYRINGE/NEEDLE    50 each    Inject 1 Syringe into the muscle every 30 days       * tacrolimus 0.5 MG capsule    PROGRAF - GENERIC EQUIVALENT    180 capsule    Take 1 capsule (0.5 mg) by mouth 2 times daily       * tacrolimus 1 MG capsule    PROGRAF - GENERIC EQUIVALENT    180 capsule    Take 1 capsule (1 mg) by mouth 2 times daily       TraMADol HCl 50 MG Tbdp     90 tablet    Take 50 mg by mouth 3 times daily as needed       * Notice:  This list has 5 medication(s) that are the same as other medications prescribed for you. Read " the directions carefully, and ask your doctor or other care provider to review them with you.

## 2017-05-03 NOTE — TELEPHONE ENCOUNTER
Noted temp yesterday was 97.7 and HR was 68 at Bhavana's visit yesterday.    Routing information to provider who is scheduled to see patient today.    Wendie Harris RN

## 2017-05-03 NOTE — PROGRESS NOTES
SUBJECTIVE:                                                    Gem Jade is a 68 year old female who is seen in her home due to inability to leave the home unassisted, morbid obesity, severe vertigo, generalized weakness, chronic pain, bed bound, rarely able to get up in the WC, seen today for the following health issues: seen with  who is frustrated today over medication confusion, and Shantelle wanting to have books on tape, had to leave for a while and take a walk     Hospital Follow-up Visit:    Hospital/Nursing Home/IP Rehab Facility: [facility]  Date of Admission: 4/18/17  Date of Discharge: 4/28/17  Reason(s) for Admission: altered mental status, UTI            Problems taking medications regularly:   unaware that clonidine stopped in the hospital so gave her that along with her other meds, BP dropped very low and meds were held over the weekend and BP parameters given, numerous med changes made:  labetalol increased to 300mg 2x daily, clonidine stopped and losartan 25mg started, norvasc added for SBP over 160, lantus reduced to 16U since patient was not eating       Medication changes since discharge: Today in consult with MTM Pharm we made the following changes:  Increase insulin back up to 24u as her appetite increases, reduce labetalol to 300mg in the am and 150mg in the evening, hold losartan, use BP parameters for administrating her labetalol and Norvasc       Problems adhering to non-medication therapy:  None  Summary of hospitalization:  [facility] discharge summary reviewed  Diagnostic Tests/Treatments reviewed.  Follow up needed: Labs done 5/1 and sent to the transplant team for review, reviewed with the patient today, GFR and Creatinine increased, message left for transplant coordinator Shabbir Jacinto about the decline in kidney function.   Other Healthcare Providers Involved in Patient s Care:         Homecare and MTM  Update since discharge: stable. Some  errors in medication which lead to being hypotensive, continue to be hypotensive and meds were adjusted today, eating better     Post Discharge Medication Reconciliation: discharge medications reconciled and changed, per note/orders (see AVS).  Plan of care communicated with patient, family and caregiver     Coding guidelines for this visit:  Type of Medical   Decision Making Face-to-Face Visit       within 7 Days of discharge Face-to-Face Visit        within 14 days of discharge   Moderate Complexity [id number]   High Complexity [id number]          Gem Jade is a 72 yo F with a history of kidney transplant (1999), neurogenic bladder with chronic rico, recurrent UTI's that present with AMS, T2DM, hypertension, and hypothyroid who was admitted with altered mental status and UTI treated with Ampicillin and nephrotic range proteinuria.       # Complicated Urinary tract infection  # Neurogenic bladder w/ chronic rico  Pt has a history of recurrent UTIs, with her most recent admission in March when she also presented with altered mental status. Previous urine cultures have grown E faecalis, E coli, and Klebsiella, most of which were either pan-sensitive or resistant to ampicillin or TMP-SMX. She did have a UTI with ESBL E. coli in February 2016. This admission, urine culture grew 2 strains of Enterococcus faecalis both sensitive to ampicillin. She was started on Ceftriaxone in the ED, and then switched to Unasyn. Following culture results, she was switched to Ampicillin, and will be discharged on Amoxicillin to complete a 2 week course of antibiotics given this is a complicated UTI with chronic rico. Blood cultures remained no growth. Repeat urine culture on 4/27/17 had no growth.      Transplant nephrology and Transplant ID were consulted to discuss measures that can be taken to prevent recurrent UTI's such as suprapubic catheter placement vs antibiotic ppx. No evidence to support suprapubic catheter placement  and decreasing risk of UTI's unless pt is to develop recurrent diarrhea and often has fecal contamination. Regarding amoxicillin ppx, there was concern she would have complications including multiple drug resistant organisms and C. Diff, which she has had in the past.      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 There were no urine eosinophils and few cells. SPEP showed a negligible monoclonal peak, and urine studies revealed that majority of proteinuria was albuminuria. She underwent renal biopsy on 4/27/17 that revealed focal segmental glomerulosclerosis with about 50% global sclerosis and 40% interstitial fibrosis. No signs of rejection or a monoclonal protein process such as amyloidosis or light chains. No signs of an acute pathology or membranous nephropathy. Electron microscopy of biopsy is still pending at discharge. Post biopsy transplant US was negative for a hematoma. It is important that her BP be well controlled. Sirolimus was stopped as it can worsen FSGS. She was followed closely by transplant nephrology throughout her hospital course.   - Tacrolimus 1.5 mg PO BID. Recheck level on Monday  - Prednisone 5 mg qday  - Labetalol 300 mg PO BID  - Losartan 25 mg PO qday. Check BMP on Monday  - Stop clonidine  - Check BP twice a day at home, an hour after taking labetalol. If SBP > 160, take amlodipine 5 mg. Transplant Nephrology clinic will follow up via phone in 1 week to determine if needs adjustment in antihypertensive regimen.      # Toxic metabolic encephalopathy sec to UTI and now appears to be Delirium   Per chart review and discussion with past care providers, she tends to have some confusion after UTI's that can take days to resolve. She did develop some paranoia and hallucinations that was resolved for the last 3-4 days of  her hospital course. Her pain medications were held given their sedating nature and delirium precautions were initiated. She did not require medications as she never became a danger to herself or others. Her chronic pain did not worsen with holding medications and recommend that they be administered as needed or be weaned off         # Hypercalcemia -Resolved  - Pt had hypercalcemia when corrected for her low albumin. Received lasix 20 mg IV for this on 4/22 and 4/23. Improved to normal levels. Unlikely it was contributing to her altered behavior. She was started on cinacalcet with continued resolution of hypercalcemia.   - Cinacalcet 30 mg PO qday          #T2DM - Glargine was initiated at 12 units qday (half dose) as pt not having much PO intake and she was given 2-6 units per day of sliding scale.   - Decreased home lantus to 16 units qday with current sliding scale regimen           # Hypothyroid  # Hypothyroid  - Continue PTA synthroid 50 mcg qday      #HLD - continue home pravastatin  - Hold ASA for 1 week after renal biopsy and then can resume.              START taking these medications     Details   losartan (COZAAR) 25 MG tablet Take 1 tablet (25 mg) by mouth daily  Qty: 90 tablet, Refills: 3     Associated Diagnoses: Kidney replaced by transplant; Benign essential hypertension       amLODIPine (NORVASC) 5 MG tablet Take 1 tablet (5 mg) by mouth daily as needed For systolic blood pressure > 160 when checked 1 hour after taking labetalol.  Qty: 90 tablet, Refills: 3     Associated Diagnoses: Kidney replaced by transplant; Benign essential hypertension       cinacalcet (SENSIPAR) 30 MG tablet Take 1 tablet (30 mg) by mouth daily  Qty: 60 tablet, Refills: 3     Associated Diagnoses: Kidney replaced by transplant; Hypercalcemia       amoxicillin (AMOXIL) 500 MG capsule Take 1 capsule (500 mg) by mouth 3 times daily  Qty: 18 capsule, Refills: 0     Associated Diagnoses: Recurrent UTI; Urinary tract infection  associated with indwelling urethral catheter, subsequent encounter                     STOP taking these medications         sirolimus (RAPAMUNE - GENERIC EQUIVALENT) 0.5 MG tablet Comments:   Reason for Stopping:            cloNIDine (CATAPRES) 0.1 MG tablet Comments:   Reason for Stopping:             You were admitted to the hospital for a urinary tract infection. 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          Adult [facility] Follow-up and recommended labs and tests   Order Comments: Follow up with primary care provider, Marivel Barcenas NP, within 7 days to evaluate medication change, for hospital follow- up and regarding new diagnosis. The following labs/tests are recommended: Follow up BMP and Tacrolimus level that will be drawn on Monday 5/1.  Appointment already scheduled for Wednesday 5/3/17 at 2 pm, home visit.     Transplant Nephrology Clinic to call in 1 week to follow up on blood pressure and lab results.     Appointments on [place] and/or [facility] (with [facility] or [facility] provider or service). Call [phone] if you haven't heard regarding these appointments within 7 days of discharge.           Activity        Order Comments: Your activity upon discharge: activity as tolerated   Order Specific Question Answer Comments   Is discharge order? Yes            When to contact your care team   Order Comments: Call your primary doctor if you have any of the following: temperature greater than 100.5, increased shortness of breath, increased pain or worsening confusion.          Tubes and drains   Order Comments: You are going home with the following tubes or drains: silver impregnated rico catheter. Replace with silver impregnated rico catheters every 2-3 weeks.          Discharge Instructions   Order Comments: Check your blood pressure 2 times daily, an hour after taking labetalol.   In the morning, if blood pressure  is > 160, take the amlodipine medication.   Record blood pressure values and If needing to take amlodipine in the morning. Renal clinic will call in 1 week to follow up on blood pressure values.      Full Code           Diet        Order Comments: Follow this diet upon discharge: Orders Placed This Encounter  Room Service  Snacks/Supplements Adult: Glucerna (Adult); Between Meals  Regular Diet Adult - Low potassium   Order Specific Question Answer Comments          Problem list and histories reviewed & adjusted, as indicated.  Additional history: as documented    Recent Labs   Lab Test  05/01/17   1254  04/28/17   0719   04/26/17   0638   04/22/17   0942   04/19/17   0005   03/09/17   1310   12/05/16   1210   08/30/16   0032   06/13/16   1140   12/20/15   0853   02/05/15   1250   05/16/13   1240   A1C   --    --    --    --    --    --    --    --    --    --    --   7.4*   --    --    --   6.7*   --   5.5   < >  6.9*   < >  7.2*   LDL   --    --    --    --    --    --    --    --    --    --    --   64   --    --    --    --    --    --    --   18   --   149*   HDL   --    --    --    --    --    --    --    --    --    --    --   46*   --    --    --    --    --    --    --   43*   --   42*   TRIG   --    --    --    --    --    --    --    --    --    --    --   272*   --    --    --    --    --    --    --   223*   --   262*   ALT   --    --    --   23   --   42   --   44   < >   --    < >   --    < >  6   < >   --    < >   --    < >   --    < >   --    CR  2.19*  1.71*   < >  1.71*   < >  1.16*   < >  1.53*   < >   --    < >   --    < >   --    < >   --    < >  0.74   < >  0.67   < >  0.70   GFRESTIMATED  22*  29*   < >  29*   < >  46*   < >  34*   < >   --    < >   --    < >   --    < >   --    < >  78   < >  87   < >  84   GFRESTBLACK  27*  36*   < >  36*   < >  56*   < >  41*   < >   --    < >   --    < >   --    < >   --    < >  >90   GFR Calc     < >  >90   GFR Calc     < >   >90   POTASSIUM  3.7  3.6   < >  3.8   < >  3.5   < >  4.1   < >   --    < >   --    < >   --    < >   --    < >  3.3*   < >  4.3   < >  3.9   TSH   --    --    --    --    --    --    --    --    --   1.20   --    --    --   1.01   --    --    < >   --    --    --    < >   --     < > = values in this interval not displayed.      BP Readings from Last 3 Encounters:   04/28/17 137/50   04/05/17 110/60   03/21/17 98/58    Wt Readings from Last 3 Encounters:   04/27/17 199 lb 8 oz (90.5 kg)   03/15/17 201 lb (91.2 kg)   02/20/17 205 lb 4.8 oz (93.1 kg)         Labs reviewed in EPIC    Reviewed and updated as needed this visit by clinical staff       Reviewed and updated as needed this visit by Provider         ROS:  Constitutional, HEENT, cardiovascular, pulmonary, gi and gu systems are negative, except as otherwise noted.    OBJECTIVE:                                                    /50 (BP Location: Left arm, Patient Position: Supine, Cuff Size: Adult Regular)  Pulse 69  SpO2 99%  There is no height or weight on file to calculate BMI.  GENERAL: alert, mild distress, obese, elderly, fatigued and laying in bed on her back  RESP: lungs clear to auscultation - no rales, rhonchi or wheezes  CV: regular rate and rhythm, normal S1 S2, no S3 or S4, no murmur, click or rub, no peripheral edema and peripheral pulses strong  ABDOMEN: soft, nontender, no hepatosplenomegaly, no masses and bowel sounds normal  MS: no gross musculoskeletal defects noted, no edema  SKIN: no suspicious lesions or rashes  NEURO: Normal strength and tone, mentation intact and speech normal  PSYCH: mentation appears normal, affect normal/bright    Diagnostic Test Results:  Results for orders placed or performed in visit on 05/01/17   CBC with platelets   Result Value Ref Range    WBC 4.8 4.0 - 11.0 10e9/L    RBC Count 3.26 (L) 3.8 - 5.2 10e12/L    Hemoglobin 9.5 (L) 11.7 - 15.7 g/dL    Hematocrit 30.6 (L) 35.0 - 47.0 %    MCV 94 78 - 100 fl     MCH 29.1 26.5 - 33.0 pg    MCHC 31.0 (L) 31.5 - 36.5 g/dL    RDW 14.5 10.0 - 15.0 %    Platelet Count 108 (L) 150 - 450 10e9/L   Basic metabolic panel   Result Value Ref Range    Sodium 139 133 - 144 mmol/L    Potassium 3.7 3.4 - 5.3 mmol/L    Chloride 106 94 - 109 mmol/L    Carbon Dioxide 23 20 - 32 mmol/L    Anion Gap 10 3 - 14 mmol/L    Glucose 241 (H) 70 - 99 mg/dL    Urea Nitrogen 32 (H) 7 - 30 mg/dL    Creatinine 2.19 (H) 0.52 - 1.04 mg/dL    GFR Estimate 22 (L) >60 mL/min/1.7m2    GFR Estimate If Black 27 (L) >60 mL/min/1.7m2    Calcium 7.5 (L) 8.5 - 10.1 mg/dL   Tacrolimus level   Result Value Ref Range    Tacrolimus Last Dose 1200     Tacrolimus Level 5.1 5.0 - 15.0 ug/L   Sirolimus level   Result Value Ref Range    Sirolimus Last Dose 1200     Sirolimus Level 3.9 (L) 5.0 - 15.0 ug/L     *Note: Due to a large number of results and/or encounters for the requested time period, some results have not been displayed. A complete set of results can be found in Results Review.        ASSESSMENT/PLAN:                                                      (Z09) Hospital discharge follow-up  (primary encounter diagnosis)  Comment: Numerous med changes in the hospital which were very confusing to the  and HC staff, significant drop in BP since being at home   Plan: BP meds adjusted, losartan on hold, labetalol reduced to 300mg in am and 150mg in pm with parameters of holding if BP less than 100/60, Norvasc only if SBP over 160    (N39.0) Recurrent UTI  Comment: Currently taking Amoxicillin, almost completed treatment, silver impregnated cath inserted in the hospital to be changed 2x a month, no PPX recommended, no sx today   Plan: finish Amoxicillin, continue with silver impregnated cath changes every 2 weeks     (Z94.0) Kidney replaced by transplant  Comment: Recent labs showed a decline in kidney function, Sirolimus discontinued in the hospital started on calcium reducer Sensipar   Plan: TC to kidney  transplant coordinator about labs, message left, they will continue to try and get a hold of them     (R41.82) Altered mental status, unspecified altered mental status type  Comment: back to baseline today,   Plan: unclear etiology thought to be due to UTI    (E11.42) Diabetic polyneuropathy associated with type 2 diabetes mellitus (H)  Comment: decreased appetite in the hospital so Lantus reduced to 16u daily  Plan: Will increase lantus back to 24u as her appetite is back at baseline     Patient Instructions   Today we have made the following med changes:    Decrease your Labetalol to 300mg in the Am and 150mg in the Pm  Increase your lantus back up to 24u as your appetite improves  Hold your losartan 25mg daily  Remember not to take your clonidine  The BP parameter for using her Norvasc is systolic BP is over 160    Please hold your Labetalol if your BP is below 100/60    Please take your BP 2x daily    Continue your other meds as directed     I will see you in one month and our PharmD will see you next week      Marivel Barcenas, NP, APRN CNP  [facility]    Klaudia Barcenas FNP  [facility]  Visit time 75   min with > than 50% of the time spent in counseling on: care coordination, hospital follow up, DM, kidney transplant, altered mental status, recurrent UTI, med adjustments, hypotension

## 2017-05-04 ENCOUNTER — TELEPHONE (OUTPATIENT)
Dept: PHARMACY | Facility: CLINIC | Age: 72
End: 2017-05-04

## 2017-05-04 ENCOUNTER — CARE COORDINATION (OUTPATIENT)
Dept: CARE COORDINATION | Facility: CLINIC | Age: 72
End: 2017-05-04

## 2017-05-04 NOTE — PROGRESS NOTES
Clinic Care Coordination Contact  OUTREACH    Referral Information:  Referral Source: MTM  Reason for Contact: Follow up on resources provided.  Care Conference: No     Universal Utilization:   ED Visits in last year: 1  Hospital visits in last year: 5  Last PCP appointment: 05/03/17  Missed Appointments: 0  Concerns: Yes  Multiple Providers or Specialists: Yes    Clinical Concerns:  Current Medical Concerns: Not discussed at this contact.    Current Behavioral Concerns: Not discussed at this contact.    Education Provided to patient: N/A   Clinical Pathway Name: None  Clinical Pathway: None    Medication Management:  N/A to this visit.     Functional Status:  Mobility Status: Dependent/Assisted by Another  Equipment Currently Used at Home: lift device, wheelchair, other (see comments)     Psychosocial:  Current living arrangement:: I live in a private home with spouse    Shantelle received the applications for the [employer]. With Keshawn's assistance, she has filled these out and will mail them in.      Resources and Interventions:  Current Resources: Home Care, DME;    Advanced Care Plans/Directives on file:: Yes     Goals: N/A  Patient/Caregiver understanding: Shantelle indicated she understood and agreed with the current plan of care.   Frequency of Care Coordination: prn  Upcoming appointment: 06/07/17     Plan: Shantelle indicated no additional care coordination needs. Closing to care coordination at this time. Patient can be referred to care coordination as needed in the future.    RAMILA Mota  Social Work Care Coordinator  [facility], Mobile Team

## 2017-05-04 NOTE — TELEPHONE ENCOUNTER
Called pt's  Keshawn to follow-up on medication changes with recent hospital discharge per PCP request after yesterday's complicated visit.    Reviewed instructions and Keshawn giving medications correctly. Reports BP today again about 100s/60s.     States Shantelle is concerned about increased Scr and not taking sirolimus, worried about organ rejection.  They have tried calling over to [facility] transplant but provider is currently out of the office.     New medication losartan does have the potential to have increase SCr. This medication is now held due to hypotension. However, still needs f/u on recent drug levels and BMP ordered by transplant clinic; encouraged continued outreach to this clinic for support.    Scheduled f/u for SHASHANK visit next week.    Conchita Toledo, PharmD, BCACP

## 2017-05-05 ENCOUNTER — TELEPHONE (OUTPATIENT)
Dept: TRANSPLANT | Facility: CLINIC | Age: 72
End: 2017-05-05

## 2017-05-05 ENCOUNTER — TELEPHONE (OUTPATIENT)
Dept: FAMILY MEDICINE | Facility: CLINIC | Age: 72
End: 2017-05-05

## 2017-05-05 NOTE — TELEPHONE ENCOUNTER
"Received call from Pharmacist with [facility] requesting that writer contact patient .      Call placed to patient's : He express concern regarding patient beginning blood pressure medications post discharge to treat HTN. States that patient blood pressure is usually low. Writer ask  if his wife had elevated blood pressures while in the hospital. He reports once that he is aware of, however also states that her blood pressure was so low at one time the the nurses had to \" put her on her head\". Also states that her discharge orders are very complicated and multiple calls to medical staff doesn't yield any information. Lastly states that her creatinine is elevated at 2.1 and he would like to know how to treat it. Writer informs  that the elevated creatinine is likely d\t patient UTI and inadequate hydration and that they should aim for at least 2-3 liter of fluid a day. He verbalizes understanding.          "

## 2017-05-05 NOTE — TELEPHONE ENCOUNTER
Pharmacist from [facility] is asking to have Marivel Barcenas NP call and speak to one of the pharmacists, [phone]. Clarification on bp meds and/or any changes since 4/28/2017.  Routed: Complex Care Marivel Barcenas NP P [id number].  Shantelle BRADFORD RN [facility]

## 2017-05-08 ENCOUNTER — TELEPHONE (OUTPATIENT)
Dept: TRANSPLANT | Facility: CLINIC | Age: 72
End: 2017-05-08

## 2017-05-08 ENCOUNTER — TELEPHONE (OUTPATIENT)
Dept: FAMILY MEDICINE | Facility: CLINIC | Age: 72
End: 2017-05-08

## 2017-05-08 VITALS — SYSTOLIC BLOOD PRESSURE: 120 MMHG | DIASTOLIC BLOOD PRESSURE: 50 MMHG | HEART RATE: 69 BPM | OXYGEN SATURATION: 99 %

## 2017-05-08 DIAGNOSIS — Z94.0 KIDNEY TRANSPLANT RECIPIENT: Primary | ICD-10-CM

## 2017-05-08 DIAGNOSIS — Z79.60 LONG-TERM USE OF IMMUNOSUPPRESSANT MEDICATION: ICD-10-CM

## 2017-05-08 NOTE — TELEPHONE ENCOUNTER
Meds are as follows:    Clonidine discontinued in the hospital, and started on Losartan which is on hold right now due to hypotension and increase in creatinine     Prednisone in unchanged from hospitalization 5mg daily    Lantus decreased in hospital due to poor appetite, appetite is back to normal so we increased her dose back up to pre-hospital dose 24U daily    She has BP parameters for her Labetalol and her norvasc,     Klaudia Manning-Jerome FNP

## 2017-05-08 NOTE — TELEPHONE ENCOUNTER
Phone call from patient's spouse, Chang    ISSUES:   - creatinine 2.1, up from 1.71 on day of discharge   - sirolimus stopped for proteinuria and losartan started by Dr. Elizabeth while inhospital  - losartan and amlodipine subsequently stopped by pharmacy for SBP <110 (per patient's spouse)  - SBP today 107, so today Chang is holding her labetalol as well   - tacrolimus level 5.1, goal 4-6, plus prednisone 5 mg daily     PLAN:   - check BMP and tacrolimus level this week.   - request plan for BP meds from Dr. Elizabeth

## 2017-05-08 NOTE — TELEPHONE ENCOUNTER
Per Klaudia's staff message, Dr. Elizabeth, transplant provider, has ordered a BMP and a tacrolimus level to be drawn this week and would like it drawn at home.    I emailed client Services to set this up. She needs the lab to be drawn between 12-1:00 so she can hold her noon dose of Prograf (tacrolimus). After the lab is drawn she can take that dose.  Frances can draw the lab on Fri, 5/12 between 12- 1:00, per Efe at [facility].    Called pt and informed her of lab appt and to hold the dose of med until after lab is drawn and she voiced understanding.    Madeline Flores RN

## 2017-05-08 NOTE — TELEPHONE ENCOUNTER
Spoke with Radha, pharmacist who just needed to clarify some of patient medications.  Only discrepancies found were as follows:    Lantus:  Patient states this was increase to 24 units     Patient also taking the following but they are not on current med list in Epic:    Clonidine 0.1 mg BID  Prednisone 5mg QD    Routing to provider and MTM to clarify the 3 medications/doses.  RN can then call pharmacist with clarification.  MTM scheduled to see patient tomorrow.    Wendie Harris RN

## 2017-05-08 NOTE — TELEPHONE ENCOUNTER
Received staff message from provider:   Yes we can send out our Clinical lab team this week.  I will forward this to our MA or RN to schedule.   Also we reduced her labetalol to 300mg in the am and 150mg in the pm, have her losartan on hold and she has BP parameters for using the labetalol and Norvasc.  She has been quite hypotensive since coming home from the hospital.   Thanks Klaudia            Previous Messages       ----- Message -----      From: Jorgito Jacinto RN      Sent: 5/8/2017   2:55 PM        To: ANTHONY Antonio CNP   Subject: Increased Creat / Low SBP                         Marivel:   QUESTION: If I place the lab order for Dr. Elizabeth, can the complex care team arrange for a blood draw for the BMP and tacrolimus level this week?     FYI: Message sent to Dr. Elizabeth today:     ISSUES:   - creatinine 2.1, up from 1.71 on day of discharge   - sirolimus stopped for proteinuria and losartan started inhospital   - losartan and amlodipine subsequently stopped by pharmacy for SBP <110 (per patient's spouse)   - SBP today 107, so today he is holding labetalol as well   - tacrolimus level 5.1, goal 4-6, plus prednisone 5 mg daily     PLAN: Check BMP and tacrolimus level this week.     QUESTION: What to you want to do about BP meds?         Message sent to Lab Client Services to draw labs as requested.    Wendie Harris RN

## 2017-05-08 NOTE — TELEPHONE ENCOUNTER
Pt is still having issues w/ medications and everything pao documented below please call pt/pt  by the end of the day

## 2017-05-08 NOTE — PATIENT INSTRUCTIONS
Today we have made the following med changes:    Decrease your Labetalol to 300mg in the Am and 150mg in the Pm  Increase your lantus back up to 24u as your appetite improves  Hold your losartan 25mg daily  Remember not to take your clonidine  The BP parameter for using her Norvasc is systolic BP is over 160    Please hold your Labetalol if your BP is below 100/60    Please take your BP 2x daily    Continue your other meds as directed     I will see you in one month and our PharmD will see you next week

## 2017-05-09 ENCOUNTER — OFFICE VISIT (OUTPATIENT)
Dept: PHARMACY | Facility: CLINIC | Age: 72
End: 2017-05-09
Payer: COMMERCIAL

## 2017-05-09 ENCOUNTER — TELEPHONE (OUTPATIENT)
Dept: TRANSPLANT | Facility: CLINIC | Age: 72
End: 2017-05-09

## 2017-05-09 ENCOUNTER — TELEPHONE (OUTPATIENT)
Dept: FAMILY MEDICINE | Facility: CLINIC | Age: 72
End: 2017-05-09

## 2017-05-09 VITALS — HEART RATE: 77 BPM | DIASTOLIC BLOOD PRESSURE: 63 MMHG | SYSTOLIC BLOOD PRESSURE: 105 MMHG

## 2017-05-09 DIAGNOSIS — Z94.0 KIDNEY REPLACED BY TRANSPLANT: ICD-10-CM

## 2017-05-09 DIAGNOSIS — R41.0 DISORIENTATION: ICD-10-CM

## 2017-05-09 DIAGNOSIS — I10 BENIGN ESSENTIAL HYPERTENSION: Primary | ICD-10-CM

## 2017-05-09 DIAGNOSIS — E11.3299 DIABETES MELLITUS WITH BACKGROUND RETINOPATHY (H): ICD-10-CM

## 2017-05-09 DIAGNOSIS — E08.42 DIABETIC POLYNEUROPATHY ASSOCIATED WITH DIABETES MELLITUS DUE TO UNDERLYING CONDITION (H): ICD-10-CM

## 2017-05-09 DIAGNOSIS — E11.42 DIABETIC POLYNEUROPATHY ASSOCIATED WITH TYPE 2 DIABETES MELLITUS (H): ICD-10-CM

## 2017-05-09 PROCEDURE — 99606 MTMS BY PHARM EST 15 MIN: CPT | Performed by: PHARMACIST

## 2017-05-09 PROCEDURE — 99607 MTMS BY PHARM ADDL 15 MIN: CPT | Performed by: PHARMACIST

## 2017-05-09 RX ORDER — LABETALOL 300 MG/1
TABLET, FILM COATED ORAL
Qty: 120 TABLET | Refills: 3 | COMMUNITY
Start: 2017-05-09 | End: 2017-06-28

## 2017-05-09 RX ORDER — MECLIZINE HCL 12.5 MG 12.5 MG/1
12.5 TABLET ORAL 3 TIMES DAILY PRN
Qty: 90 TABLET | Refills: 1 | Status: ON HOLD | COMMUNITY
Start: 2017-05-09 | End: 2017-11-09

## 2017-05-09 RX ORDER — GABAPENTIN 300 MG/1
900 CAPSULE ORAL 2 TIMES DAILY
Qty: 180 CAPSULE | Refills: 3 | COMMUNITY
Start: 2017-05-09 | End: 2017-11-09

## 2017-05-09 RX ORDER — OXCARBAZEPINE 300 MG/1
300 TABLET, FILM COATED ORAL 2 TIMES DAILY
Qty: 180 TABLET | Refills: 3 | Status: ON HOLD | COMMUNITY
Start: 2017-05-09 | End: 2018-01-25

## 2017-05-09 NOTE — TELEPHONE ENCOUNTER
Spoke with MADHURI Arango who is at the patient's home currently.  She agreed that labs could be drawn earlier than Friday.      Sent e-mail to Lab Client Services to see if lab appointment can be moved up sooner this week.    Wendie Harris RN

## 2017-05-09 NOTE — TELEPHONE ENCOUNTER
Left voicemail for Colleen, PT [facility] and gave verbal ok to extend PT eval per RN protocol.    Wendie Harris RN

## 2017-05-09 NOTE — TELEPHONE ENCOUNTER
Spoke with spouse who is upset over many items currently.  He states he feels patient is falling through the cracks.  He is concerned that her creatinine is trending up again.  He would like to have the labs completed prior to Friday as he feels this is waiting too long and is concerned she will end up in the hospital again.  He also had concerns about her BP trending low, yet she is on BP meds.  He stated nobody has been able to figure out her discharge medications and there have been several people involved at trying to resolve this.  MTM is scheduled to see patient today so he is hoping they will finally get resolved.      Spouse also had concerns about the homecare nurses that have been coming in to see the patient.  He had complaints about her current CM.  RN called SEKOU Coyne [facility] Clinical Coordinator to discuss spouse's concerns.  She will have the  the spouse regarding his concerns.    Spoke with Asya in transplant office.  Requested to see if the labs ordered can be drawn sooner per spouse request.  Shabbir Jacinto is out of the office so she will plan to have another coordinator return call.    Routing to provider and MTM as FYI.    Wendie Harris RN

## 2017-05-09 NOTE — TELEPHONE ENCOUNTER
Chang called again at 10:12 am.  Advised him that RN would call back.    At 10:48 Le,  with [facility] called as well.  She is following up on 's concern that creatinine is high.      She said that Chang is concerned that her follow up labs are 2 weeks later than last ones and usually they are only 1 week apart.      Please also call Le back at [phone]

## 2017-05-09 NOTE — TELEPHONE ENCOUNTER
Call from Complex Care `Daniel' # [phone]   Chang called out to complex [facility] to see if they can come out to draw labs sooner than Friday, May 12th, 2017  Please call `Daniel' # [phone]

## 2017-05-09 NOTE — MR AVS SNAPSHOT
After Visit Summary   5/9/2017    Gem Jade    MRN: [id number]           Patient Information     Date Of Birth          1945        Visit Information        Provider Department      5/9/2017 2:00 PM Conchita Toledo, [facility]        Today's Diagnoses     Benign essential hypertension    -  1    Diabetic polyneuropathy associated with type 2 diabetes mellitus (H)        Kidney replaced by transplant        Diabetes mellitus with background retinopathy        Diabetic polyneuropathy associated with diabetes mellitus due to underlying condition (H)        Disorientation          Care Instructions    Recommendations from today's MTM visit:                                                        1. Restart taking aspirin 1 tablet daily    2. Increase Novolog to taking 14 units with breakfast and 10 units with dinner. Please try to get both doses of Novolog every day.     3. Sent Julia Breeze glucometer to the pharmacy. Please let me know if Shantelle has problems with this meter.    Next MTM visit: 5/24 at 2pm    To schedule another MTM appointment, please call the clinic directly or you may call the MTM scheduling line at [phone] or toll-free at [phone].     My Clinical Pharmacist's contact information:                                                      It was a pleasure seeing you today!  Please feel free to contact me with any questions or concerns you have.      Conchita Toledo, PharmD, BCACP     You may receive a survey about the MTM services you received.  I would appreciate your feedback to help me serve you better in the future. Please fill it out and return it when you can. Your comments will be anonymous.                Follow-ups after your visit        Your next 10 appointments already scheduled     May 24, 2017  2:00 PM CDT   Office Visit with Conchita Toledo [facility] ([facility])    [address] [place] [address]  [address]  [facility] [address] [phone]           Bring a current list of meds and any records pertaining to this visit.  For Physicals, please bring immunization records and any forms needing to be filled out.  Please arrive 10 minutes early to complete paperwork.            Jun 07, 2017  2:00 PM CDT   Return Visit with ANTHONY Antonio CNP   [facility] ([facility])    [address] [place] [address]  [address] [place] [id number] [phone]              Who to contact     If you have questions or need follow up information about today's clinic visit or your schedule please contact [facility] directly at [phone].  Normal or non-critical lab and imaging results will be communicated to you by MyChart, letter or phone within 4 business days after the clinic has received the results. If you do not hear from us within 7 days, please contact the clinic through MyChart or phone. If you have a critical or abnormal lab result, we will notify you by phone as soon as possible.  Submit refill requests through [employer] or call your pharmacy and they will forward the refill request to us. Please allow 3 business days for your refill to be completed.          Additional Information About Your Visit        Care EveryWhere ID     This is your Care EveryWhere ID. This could be used by other organizations to access your [facility] medical records  [id number]        Your Vitals Were     Pulse                   77            Blood Pressure from Last 3 Encounters:   05/09/17 105/63   05/08/17 120/50   04/28/17 137/50    Weight from Last 3 Encounters:   04/27/17 199 lb 8 oz (90.5 kg)   03/15/17 201 lb (91.2 kg)   02/20/17 205 lb 4.8 oz (93.1 kg)              Today, you had the following     No orders found for display         Today's Medication Changes          These changes are accurate as of:  5/9/17 11:59 PM.  If you have any questions, ask your nurse or doctor.               Start taking these medicines.        Dose/Directions    blood glucose monitoring lancets   Used for:  Diabetic polyneuropathy associated with type 2 diabetes mellitus (H)   Started by:  Conchita Toledo RPH        Use to test blood sugar 3 times daily or as directed.  Ok to substitute alternative if insurance prefers.   Quantity:  1 Box   Refills:  11       blood glucose monitoring meter device kit   Used for:  Diabetic polyneuropathy associated with type 2 diabetes mellitus (H)   Started by:  Conchita Toledo RPH        Use to test blood sugar 3 times daily or as directed.  Ok to substitute alternative if insurance prefers.   Quantity:  1 kit   Refills:  0       blood glucose test strip   Commonly known as:  ROSANNA BREEZE 2   Used for:  Diabetic polyneuropathy associated with type 2 diabetes mellitus (H)   Started by:  Conchita Toleod RPH        Use to test blood sugar 3 times daily or as directed. Ok to substitute alternative if insurance prefers.   Quantity:  100 each   Refills:  11         These medicines have changed or have updated prescriptions.        Dose/Directions    gabapentin 300 MG capsule   Commonly known as:  NEURONTIN   This may have changed:    - how much to take  - when to take this  - reasons to take this   Used for:  Diabetic polyneuropathy associated with type 2 diabetes mellitus (H)   Changed by:  Conchita Toledo RPH        Dose:  900 mg   Take 3 capsules (900 mg) by mouth 2 times daily For pain   Quantity:  180 capsule   Refills:  3       labetalol 300 MG tablet   Commonly known as:  NORMODYNE   This may have changed:    - how much to take  - how to take this  - when to take this  - additional instructions   Used for:  Benign essential hypertension, Kidney replaced by transplant   Changed by:  Conchita Toledo RPH        Take 1 tablet (300mg) in the AM and 1/2 tablet  (150mg) in the PM. Hold for systolic BP less than 120.   Quantity:  120 tablet   Refills:  3       LANTUS SOLOSTAR 100 UNIT/ML injection   This may have changed:  additional instructions   Used for:  Diabetes mellitus with background retinopathy (H)   Generic drug:  insulin glargine   Changed by:  Conchita Toledo RPH        Inject 24 units under the skin once daily.   Quantity:  15 mL   Refills:  3       meclizine 12.5 MG tablet   Commonly known as:  ANTIVERT   This may have changed:    - when to take this  - reasons to take this   Used for:  Disorientation   Changed by:  Conchita Toledo RPH        Dose:  12.5 mg   Take 1 tablet (12.5 mg) by mouth 3 times daily as needed for dizziness   Quantity:  90 tablet   Refills:  1       NovoLOG FLEXPEN 100 UNIT/ML injection   This may have changed:  additional instructions   Used for:  Diabetes mellitus with background retinopathy (H)   Generic drug:  insulin aspart   Changed by:  Conchita Toledo RPH        Inject 14u with breakfast and 10u with dinner. At bedtime use following sliding scale:  150-200 5u 201-250 7u 251-300 9u 301-350 11u 351+ 13u   Quantity:  15 mL   Refills:  0       TraMADol HCl 50 MG Tbdp   Indication:  Moderate to Moderately Severe Pain   This may have changed:  when to take this   Used for:  Chronic shoulder pain, unspecified laterality        Dose:  50 mg   Take 50 mg by mouth 3 times daily as needed   Quantity:  90 tablet   Refills:  3       TRILEPTAL 300 MG tablet   This may have changed:    - when to take this  - reasons to take this   Used for:  Diabetic polyneuropathy associated with diabetes mellitus due to underlying condition (H)   Generic drug:  OXcarbazepine   Changed by:  Conchita Toledo RPH        Dose:  300 mg   Take 1 tablet (300 mg) by mouth 2 times daily For pain   Quantity:  180 tablet   Refills:  3            Where to get your medicines      These medications were sent to [facility] - [place], [place] - [address]  [address] [facility] [address]    Hours:  Mon-Fri 8:30am-5:00pm Toll Free [phone] Phone:  [phone]     blood glucose monitoring lancets    blood glucose monitoring meter device kit    blood glucose test strip                Primary Care Provider Office Phone # Fax #    ANTHONY Antonio [phone]        [facility] [address]  [facility] [address]        Thank you!     Thank you for choosing [facility]  for your care. Our goal is always to provide you with excellent care. Hearing back from our patients is one way we can continue to improve our services. Please take a few minutes to complete the written survey that you may receive in the mail after your visit with us. Thank you!             Your Updated Medication List - Protect others around you: Learn how to safely use, store and throw away your medicines at www.disposemymeds.org.          This list is accurate as of: 5/9/17 11:59 PM.  Always use your most recent med list.                   Brand Name Dispense Instructions for use    ACETAMINOPHEN PO      Take 325-650 mg by mouth every 4 hours as needed.       alcohol swab prep pads     1 Box    Use to swab area of injection/yvon and vials as directed.       amLODIPine 5 MG tablet    NORVASC    90 tablet    Take 1 tablet (5 mg) by mouth daily as needed For systolic blood pressure > 160 when checked 1 hour after taking labetalol.       aspirin 81 MG chewable tablet     90 tablet    Take 1 tablet (81 mg) by mouth daily Starting 1 week after your kidney biopsy       blood glucose monitoring lancets     1 Box    Use to test blood sugar 3 times daily or as directed.  Ok to substitute alternative if insurance prefers.       blood glucose monitoring meter device kit     1 kit    Use to test blood sugar 3 times daily or as directed.  Ok to  substitute alternative if insurance prefers.       blood glucose test strip    ROSANNA BREEZE 2    100 each    Use to test blood sugar 3 times daily or as directed. Ok to substitute alternative if insurance prefers.       cinacalcet 30 MG tablet    SENSIPAR    60 tablet    Take 1 tablet (30 mg) by mouth daily       clotrimazole 1 % cream    LOTRIMIN    40 g    Apply topically 2 times daily Apply to buttocks twice a day until rash resolved.       Cranberry 400 MG Tabs      Take 400 mg by mouth 2 times daily       cyanocobalamin 1000 MCG/ML injection    VITAMIN B12    3 mL    Inject 1 mL (1,000 mcg) into the muscle every 30 days       diphenoxylate-atropine 2.5-0.025 MG per tablet    LOMOTIL    50 tablet    Take 1 tablet by mouth 4 times daily as needed for diarrhea       famotidine 20 MG tablet    PEPCID    60 tablet    Take 1 tablet (20 mg) by mouth 2 times daily       gabapentin 300 MG capsule    NEURONTIN    180 capsule    Take 3 capsules (900 mg) by mouth 2 times daily For pain       GLUCAGON EMERGENCY 1 MG kit   Generic drug:  glucagon     1 each    Inject 1 mg into the muscle once       insulin pen needle 30G X 8 MM     100 each    Use as directed with Lantus       labetalol 300 MG tablet    NORMODYNE    120 tablet    Take 1 tablet (300mg) in the AM and 1/2 tablet (150mg) in the PM. Hold for systolic BP less than 120.       lactobacillus rhamnosus (GG) capsule     60 capsule    Take 1 capsule by mouth 2 times daily       LANTUS SOLOSTAR 100 UNIT/ML injection   Generic drug:  insulin glargine     15 mL    Inject 24 units under the skin once daily.       levothyroxine 50 MCG tablet    SYNTHROID    90 tablet    Take 1 tablet (50 mcg) by mouth daily       loratadine 10 MG tablet    CLARITIN    90 tablet    Take 1 tablet (10 mg) by mouth daily       meclizine 12.5 MG tablet    ANTIVERT    90 tablet    Take 1 tablet (12.5 mg) by mouth 3 times daily as needed for dizziness       NovoLOG FLEXPEN 100 UNIT/ML injection  "  Generic drug:  insulin aspart     15 mL    Inject 14u with breakfast and 10u with dinner. At bedtime use following sliding scale:  150-200 5u 201-250 7u 251-300 9u 301-350 11u 351+ 13u       NYSTOP 516487 UNIT/GM Powd   Generic drug:  nystatin     60 g    Externally apply 1 g topically 3 times daily as needed       ondansetron 4 MG ODT tab    ZOFRAN ODT    20 tablet    Take 1-2 tablets (4-8 mg) by mouth every 8 hours as needed for nausea       order for DME     3 catheter    Equipment being ordered: Johnson Catheters - 18 french       * order for DME     100 pad    Equipment being [facility]       * order for DME     1 each    Equipment being ordered: [employer] FX CPAP interface (nasal pillows), size XS.       * order for DME     12 each    Equipment being ordered: Silver Impregnated catheters HX of frequent UTI       pravastatin 10 MG tablet    PRAVACHOL    90 tablet    Take 1 tablet (10 mg) by mouth daily       predniSONE 5 MG tablet    DELTASONE    90 tablet    Take 1 tablet (5 mg) by mouth daily       simethicone 125 MG Chew chewable tablet    MYLICON    120 tablet    Take 1 tablet (125 mg) by mouth as needed for intestinal gas       syringe/needle (disp) 22G X 1-1/2\" 3 ML Misc    B-D SYRINGE/NEEDLE    50 each    Inject 1 Syringe into the muscle every 30 days       * tacrolimus 0.5 MG capsule    PROGRAF - GENERIC EQUIVALENT    180 capsule    Take 1 capsule (0.5 mg) by mouth 2 times daily       * tacrolimus 1 MG capsule    PROGRAF - GENERIC EQUIVALENT    180 capsule    Take 1 capsule (1 mg) by mouth 2 times daily       TraMADol HCl 50 MG Tbdp     90 tablet    Take 50 mg by mouth 3 times daily as needed       TRILEPTAL 300 MG tablet   Generic drug:  OXcarbazepine     180 tablet    Take 1 tablet (300 mg) by mouth 2 times daily For pain       * Notice:  This list has 5 medication(s) that are the same as other medications prescribed for you. Read the directions carefully, and ask your " doctor or other care provider to review them with you.

## 2017-05-09 NOTE — TELEPHONE ENCOUNTER
Colleen with [facility] called to request:    Ok to extend PT eval.    [phone]

## 2017-05-09 NOTE — TELEPHONE ENCOUNTER
"Call from Chang Jade, who was hesitant to leave much information with writer as I am not a nurse.    \"We're falling through the cracks again\".  Shantelle's creatinine is much too high and no one is doing anything to address it.        Looks like f/u BMP is scheduled for this Friday May 12th.    Call back to [phone]      "

## 2017-05-09 NOTE — TELEPHONE ENCOUNTER
Returned call to Twila with [facility] and left voicemail stating still waiting to hear back from the transplant lab.  Also informed her that all details of spouse's concern were left with SEKOU Coyne [facility].    Wendie Harris RN

## 2017-05-09 NOTE — TELEPHONE ENCOUNTER
I spoke to patient's  per his request.  He is concerned with elevated SCr.  I reviewed notes from transplant coordinator and informed him that plan is to repeat labs this week and that orders are in place.  He would like labs drawn sooner than Friday ( states they are scheduled to come Friday).  I informed him that per the notes in Epic it appears  is working to have labs drawn sooner and I directed him to contact them to confirm.  He thanked me for the call and I left my contact info if further questions.

## 2017-05-09 NOTE — PROGRESS NOTES
SUBJECTIVE/OBJECTIVE:                                                    Gem Jade is a 71 year old female seen at their home for a transitions of care visit.  She was discharged from [facility] on 4/28/17 for ROB, UTI. Spoke with  Keshawn for majority of the visit. Pt also had bath aid and HCRN Bhavana for catheter change present.     Chief Complaint: confusion around plan post discharge for labs, BP meds.    Tobacco: History of tobacco dependence - quit   Alcohol: not currently using    Medication Adherence: issues found and discussed below and has assistance setting up med boxes (Keshawn)    Hypertension: Current medications include labetalol 300mg AM and 150mg PM. Has stopped amlodipine and losartan. Confusion around BP parameters to hold labetalol, feels like the instructions keep changing.  Keshawn is quite overwhelmed. Patient does self-monitor BP with wrist cuff. Home BP monitoring in range of 's systolic over 30-70's diastolic.  Patient reports no current medication side effects.    Diabetes:  Pt currently taking Lantus 24 units daily, Manhtfg64 units with meals BID when not testing BS and uses sliding scale 2 units for every 50 over 201. Glucometer is broken, using Keshawn's, and would like a replacement. Prefers Compact Plus but unable to get it. Has been missing half the doses of Novolog for the past few days due to stress.  SMBG: one to three times daily.   Ranges (patient reported): variable, 100-400s, usually in 200s.  Symptoms of low blood sugar? none. Frequency of hypoglycemia? never.  Recent symptoms of high blood sugar? none  Pt is not taking an ACEi/ARB. (Hx worsening renal function when on ACE/ARB therapy)  Aspirin: Still holding aspirin, hospital instructions to hold x7 days post renal biopsy  Diet/exercise: has been eating 2 times a day, appetite is normal. Thinks appetite is back to baseline prior to portion restriction. Bagel and juice for breakfast. She is bed bound, no  exercise.    S/p kidney transplant: currently taking tacrolimus 1.5mg BID and prednisone 5mg daily. Keshawn is concerned about stopping sirolimus in the hospital and not having SCr rechecked since last elevated level. Has been making multiple phone calls to advocate for pt and have labs checked. Tac level and BMP have been ordered and scheduled for Friday prior to AM Tac dose (12-1pm) with client services per chart review, which Keshawn thinks is too long to wait.     Current labs include:  BP Readings from Last 3 Encounters:   05/08/17 120/50   04/28/17 137/50   04/05/17 110/60     Today's Vitals: /63  Pulse 77  Lab Results   Component Value Date    A1C 7.4 12/05/2016   .  Lab Results   Component Value Date    CHOL 164 12/05/2016     Lab Results   Component Value Date    TRIG 272 12/05/2016     Lab Results   Component Value Date    HDL 46 12/05/2016     Lab Results   Component Value Date    LDL 64 12/05/2016     Lab Results   Component Value Date    ALT 23 04/26/2017     Lab Results   Component Value Date    UCRR 27 04/26/2017    MICROL 1850 04/26/2017    UMALCR 6776.56 (H) 04/26/2017       Last Basic Metabolic Panel:  Lab Results   Component Value Date     05/01/2017      Lab Results   Component Value Date    POTASSIUM 3.7 05/01/2017     Lab Results   Component Value Date    CHLORIDE 106 05/01/2017     Lab Results   Component Value Date    BUN 32 05/01/2017     Lab Results   Component Value Date    CR 2.19 05/01/2017     GFR Estimate   Date Value Ref Range Status   05/01/2017 22 (L) >60 mL/min/1.7m2 Final     Comment:     Non  GFR Calc   04/28/2017 29 (L) >60 mL/min/1.7m2 Final     Comment:     Non  GFR Calc   04/27/2017 29 (L) >60 mL/min/1.7m2 Final     Comment:     Non  GFR Calc     GFR Estimate If Black   Date Value Ref Range Status   05/01/2017 27 (L) >60 mL/min/1.7m2 Final     Comment:      GFR Calc   04/28/2017 36 (L) >60 mL/min/1.7m2  Final     Comment:      GFR Calc   04/27/2017 35 (L) >60 mL/min/1.7m2 Final     Comment:      GFR Calc     TSH   Date Value Ref Range Status   03/09/2017 1.20 0.40 - 4.00 mU/L Final   ]    Most Recent Immunizations   Administered Date(s) Administered     Hepatitis B 01/13/2003     Influenza (H1N1) 11/25/2009     Influenza (High Dose) 3 valent vaccine 10/07/2016     Influenza (IIV3) 11/05/2011     Influenza Vaccine IM 3yrs+ 4 Valent IIV4 10/16/2013     Pneumococcal (PCV 13) 12/01/2015     Pneumococcal 23 valent 06/17/2013     TDAP Vaccine (Adacel) 06/17/2013     Zoster vaccine, live 06/17/2013     ASSESSMENT:                                                       Current medications were reviewed today.      Medication Adherence: needs improvement - see below    Hypertension: stable. BP at goal <140/90. Clarified BP parameters per nephrology with Keshawn. Likely having somewhat inaccurate diastolic readings with wrist cuff since these are much lower than her usual BP readings. Will continue to monitor.   Diabetes: needs improvement. A1c at goal <8%, however recent BS have been elevated. Encouraged adherence to breakfast Novolog and will increase to 14u to compensate for high carb choices. Discussed glucometer options; sent Wantreez Musice.   S/p renal transplant: stable. Recent Tac levels within goal range. Discussed lab coordination with [facility] RN and will attempt to move up lab draw with client services to tomorrow or Thursday, however due to restricted timing of lab, may need to wait until Friday pending availability of phlebotomist. Explained to Keshawn who voices understanding.      PLAN:                                                      Post Discharge Medication Reconciliation Status: discharge medications reconciled, continue medications without change.    1. Restart aspirin  2. Increase breakfast Novolog to 14 units and continue to work on adherence  3. Sent Wantreez Musice glucometer to  pharmacy    I spent 90 minutes with this patient today.  All changes were made via collaborative practice agreement with Marivel Barcenas. A copy of the visit note was provided to the patient's primary care provider.    Will follow up in 2 weeks.    The patient was mailed a summary of these recommendations as an after visit summary.    Conchita Toledo, PharmD, BCACP

## 2017-05-10 ENCOUNTER — TELEPHONE (OUTPATIENT)
Dept: PHARMACY | Facility: CLINIC | Age: 72
End: 2017-05-10

## 2017-05-10 NOTE — PATIENT INSTRUCTIONS
Recommendations from today's MTM visit:                                                        1. Restart taking aspirin 1 tablet daily    2. Increase Novolog to taking 14 units with breakfast and 10 units with dinner. Please try to get both doses of Novolog every day.     3. Sent Julia Breeze glucometer to the pharmacy. Please let me know if Shantelle has problems with this meter.    Next MTM visit: 5/24 at 2pm    To schedule another MTM appointment, please call the clinic directly or you may call the MTM scheduling line at [phone] or toll-free at [phone].     My Clinical Pharmacist's contact information:                                                      It was a pleasure seeing you today!  Please feel free to contact me with any questions or concerns you have.      Conchita Toledo, PharmD, BCACP     You may receive a survey about the MTM services you received.  I would appreciate your feedback to help me serve you better in the future. Please fill it out and return it when you can. Your comments will be anonymous.

## 2017-05-10 NOTE — TELEPHONE ENCOUNTER
Pt's  Keshawn called to clarify dosing of BP medications.    Reported BP today as 121/66.  Asked if pt should receive labetalol 300mg.     Per guidelines from nephrology, pt to take labetalol for systolic BP>120. Reviewed with Keshawn and voiced understanding.    Conchita Toledo, PharmD, BCACP

## 2017-05-10 NOTE — TELEPHONE ENCOUNTER
Message from Lab Client Services stating labs will be drawn Thursday at 12:30.    Wendie Harris RN

## 2017-05-11 ENCOUNTER — TELEPHONE (OUTPATIENT)
Dept: TRANSPLANT | Facility: CLINIC | Age: 72
End: 2017-05-11

## 2017-05-11 ENCOUNTER — TELEPHONE (OUTPATIENT)
Dept: FAMILY MEDICINE | Facility: CLINIC | Age: 72
End: 2017-05-11

## 2017-05-11 DIAGNOSIS — Z94.0 KIDNEY TRANSPLANT RECIPIENT: ICD-10-CM

## 2017-05-11 DIAGNOSIS — Z79.60 LONG-TERM USE OF IMMUNOSUPPRESSANT MEDICATION: ICD-10-CM

## 2017-05-11 PROCEDURE — 36415 COLL VENOUS BLD VENIPUNCTURE: CPT | Performed by: FAMILY MEDICINE

## 2017-05-11 PROCEDURE — 80197 ASSAY OF TACROLIMUS: CPT | Performed by: FAMILY MEDICINE

## 2017-05-11 PROCEDURE — 80048 BASIC METABOLIC PNL TOTAL CA: CPT | Performed by: FAMILY MEDICINE

## 2017-05-11 NOTE — TELEPHONE ENCOUNTER
Please call pt back. Labs are pending. As soon as they have resulted and been reviewed by a coordinator they will be available

## 2017-05-11 NOTE — TELEPHONE ENCOUNTER
Shoshana with [facility] called to request:    Home PT 1w1 then 2w3    OT eval and treat.    [phone], ok to M

## 2017-05-11 NOTE — TELEPHONE ENCOUNTER
Left voicemail for Shoshana, PT [facility] and gave verbal ok on orders requested per RN protocol.    Wendie Harris RN

## 2017-05-11 NOTE — TELEPHONE ENCOUNTER
Spoke with Chang who reports he is worried about wife's creatinine levels.   Last one was above baseline at 2.19. Patient does have frequent UTI's and has not had a UA after completing antibiotics.  States a phlebotomists Frances? Comes to draw her labs.  Discussed plan to add a UA. Verbalized understanding and agreement to plan.    Calling [facility] for results on chemistry that was drawn this morning at 1130AM.  Sample drawn from [facility] and should get resulted at  [facility].  Results are still pending and should be resulted later tonight.

## 2017-05-11 NOTE — TELEPHONE ENCOUNTER
Needs someone to call them regarding high creatinine.  Blood taken today.  They are very concerned and frightened about the creatinine level today.     Says a person named Frances comes in, draws blood, and leaves.  He wants to know how long it takes to get the creatinine results.  I tried to ask him if Frances is a nurse from [place] and I couldn't get an answer.  I tried looking in the chart for anyone named Frances associated with the patient and could not find anything.  I couldn't say how long it takes to process because i do not know if we process the labs, if the blood is mailed to us, or if another entity processes them.    He was highly upset, and said we were done and hung up on me.    Mala

## 2017-05-12 ENCOUNTER — TELEPHONE (OUTPATIENT)
Dept: TRANSPLANT | Facility: CLINIC | Age: 72
End: 2017-05-12

## 2017-05-12 LAB
ANION GAP SERPL CALCULATED.3IONS-SCNC: 11 MMOL/L (ref 3–14)
BUN SERPL-MCNC: 25 MG/DL (ref 7–30)
CALCIUM SERPL-MCNC: 7.8 MG/DL (ref 8.5–10.1)
CHLORIDE SERPL-SCNC: 108 MMOL/L (ref 94–109)
CO2 SERPL-SCNC: 22 MMOL/L (ref 20–32)
CREAT SERPL-MCNC: 1.37 MG/DL (ref 0.52–1.04)
GFR SERPL CREATININE-BSD FRML MDRD: 38 ML/MIN/1.7M2
GLUCOSE SERPL-MCNC: 143 MG/DL (ref 70–99)
POTASSIUM SERPL-SCNC: 3.9 MMOL/L (ref 3.4–5.3)
SODIUM SERPL-SCNC: 141 MMOL/L (ref 133–144)
TACROLIMUS BLD-MCNC: 3 UG/L (ref 5–15)
TME LAST DOSE: ABNORMAL H

## 2017-05-12 NOTE — TELEPHONE ENCOUNTER
Called patient's  to follow-up on concerns about labs earlier this week.  I shared with him the most recent labs results and Dr. Elizabeth's result note.  He was very pleased with the news and thanked me for the call.

## 2017-05-12 NOTE — TELEPHONE ENCOUNTER
Labs drawn and results in process.  Will hold until lab is back; then route to PCP for review. Sandi Long RN May 12, 2017 1:56 PM

## 2017-05-15 NOTE — TELEPHONE ENCOUNTER
"Pt's  spoke with Cornelia Brown RN from transplant clinic, on 5/12:  \"Called patient's  to follow-up on concerns about labs earlier this week. I shared with him the most recent labs results and Dr. Elizabeth's result note. He was very pleased with the news and thanked me for the call.\"     No further action required.    Madeline Flores RN              "

## 2017-05-24 ENCOUNTER — OFFICE VISIT (OUTPATIENT)
Dept: PHARMACY | Facility: CLINIC | Age: 72
End: 2017-05-24
Payer: COMMERCIAL

## 2017-05-24 VITALS — OXYGEN SATURATION: 99 % | SYSTOLIC BLOOD PRESSURE: 110 MMHG | HEART RATE: 75 BPM | DIASTOLIC BLOOD PRESSURE: 60 MMHG

## 2017-05-24 DIAGNOSIS — I10 BENIGN ESSENTIAL HYPERTENSION: ICD-10-CM

## 2017-05-24 DIAGNOSIS — Z94.0 KIDNEY REPLACED BY TRANSPLANT: ICD-10-CM

## 2017-05-24 DIAGNOSIS — E11.3299 DIABETES MELLITUS WITH BACKGROUND RETINOPATHY (H): Primary | ICD-10-CM

## 2017-05-24 PROCEDURE — 99607 MTMS BY PHARM ADDL 15 MIN: CPT | Performed by: PHARMACIST

## 2017-05-24 PROCEDURE — 99606 MTMS BY PHARM EST 15 MIN: CPT | Performed by: PHARMACIST

## 2017-05-24 NOTE — PROGRESS NOTES
SUBJECTIVE/OBJECTIVE:                Gem Jade is a 71 year old female seen at their home for a follow-up visit for Medication Therapy Management.  She was referred to me from Marivel Barcenas. Seen with pharmacy student Yolanda     Chief Complaint: Follow up from our visit on 5/9/17.    Tobacco: History of tobacco dependence - quit   Alcohol: not currently using    Medication Adherence: issues found and discussed below and has assistance setting up med boxes (Keshawn)    Neurogenic bladder: recently had rico catheter change and lidocaine not applied correctly, pt having pain which is slowly subsiding. Pt and  are frustrated with the new RN since catheter changes have not gone well with her. Concerns have been brought to the attention of the home care RN supervisor but unable to change RNs at this time. Also asking why catheter now needs to be changed Q2 weeks instead of monthly; would like it changed back if possible.     Hypertension: Currently not taking any medications since BP has been below goal range. Prescriptions include labetalol 300mg AM and 150mg PM, amlodipine and losartan.  Patient does self-monitor BP with wrist cuff, HCRN also monitors. Home BP monitoring in range of 's systolic over 30-70's diastolic.      Diabetes:  Pt currently taking Lantus 24 units daily, Novolog 14 units with meals BID when not testing BS and uses sliding scale 2 units for every 50 over 201. Has been doing better with remembering Novolog.  SMBG: one to two times daily.   Ranges (glucometer):   Date FBG/ 2hours post Lunch/2hours post Dinner /2hours post bedtime   5/23   255 255   5/22    241   5/21    172   520 209   268   5/19    289   5/18   247 363   5/17 85   322      Symptoms of low blood sugar? none. Frequency of hypoglycemia? never.  Recent symptoms of high blood sugar? none  Pt is not taking an ACEi/ARB. (Hx worsening renal function when on ACE/ARB therapy)  Aspirin: taking aspirin 81mg daily  without problems  Diet/exercise: has been eating 2 times a day, appetite is normal. Thinks appetite is back to baseline prior to portion restriction. Bagel and juice for breakfast. She is bed bound, no exercise.    S/p kidney transplant: currently taking tacrolimus 1.5mg BID and prednisone 5mg daily. BMP and Tac level have been checked since last visit and reviewed by transplant clinic, however pt and spouse remain unhappy labs are not being checked more often and concerned about creatinine still. Shantelle wants to know why SCr is not back at baseline of 0.8.     Current labs include:  BP Readings from Last 3 Encounters:   05/09/17 105/63   05/08/17 120/50   04/28/17 137/50     Today's Vitals: /60  Pulse 75  SpO2 99%    Most Recent Immunizations   Administered Date(s) Administered     Hepatitis B 01/13/2003     Influenza (H1N1) 11/25/2009     Influenza (High Dose) 3 valent vaccine 10/07/2016     Influenza (IIV3) 11/05/2011     Influenza Vaccine IM 3yrs+ 4 Valent IIV4 10/16/2013     Pneumococcal (PCV 13) 12/01/2015     Pneumococcal 23 valent 06/17/2013     TDAP Vaccine (Adacel) 06/17/2013     Zoster vaccine, live 06/17/2013     ASSESSMENT:              Current medications were reviewed today as discussed above.      Medication Adherence: no issues identified    Neurogenic bladder: will forward on concerns to PCP for review  Hypertension: stable off medication. BP at goal <140/90. Will continue to monitor and consider d/c amlodipine and losartan at f/u if BP remains well below goal.   Diabetes: needs improvement. A1c at goal <8%, however due for 6 month recheck in June. Would like additional information regarding BS levels prior to further titrating insulin; pt agreeable.  S/p kidney transplant: stable. Last Tac level wnl and SCr significantly improved. Does not seem necessary at this time to check BMP more frequently. Will defer to transplant team to reorder labs as needed.       PLAN:                  Increase  BS testing to TID - Fasting, pre-dinner, bedtime    At f/u:  Recheck A1c    I spent 60 minutes with this patient today.  All changes were made via collaborative practice agreement with Marivel Barcenas. A copy of the visit note was provided to the patient's primary care provider.     Will follow up in 1 month.    The patient was mailed a summary of these recommendations as an after visit summary.    Conchita Toledo, PharmD, BCACP

## 2017-05-24 NOTE — MR AVS SNAPSHOT
After Visit Summary   5/24/2017    Gem Jade    MRN: [id number]           Patient Information     Date Of Birth          1945        Visit Information        Provider Department      5/24/2017 2:00 PM Conchita Toledo, [facility]        Today's Diagnoses     Diabetes mellitus with background retinopathy    -  1    Kidney replaced by transplant        Benign essential hypertension          Care Instructions    Recommendations from today's MTM visit:                                                        1. Increase blood sugar testing to 3 times a day - fasting, before dinner, and bedtime    2. I forwarded your concerns about the catheter to Klaudia    Next MTM visit: 1 month    To schedule another MTM appointment, please call the clinic directly or you may call the MTM scheduling line at [phone] or toll-free at [phone].     My Clinical Pharmacist's contact information:                                                      It was a pleasure seeing you today!  Please feel free to contact me with any questions or concerns you have.      Conchita Toledo, PharmD, BCACP     You may receive a survey about the MTM services you received.  I would appreciate your feedback to help me serve you better in the future. Please fill it out and return it when you can. Your comments will be anonymous.                  Follow-ups after your visit        Your next 10 appointments already scheduled     Jun 07, 2017  2:00 PM CDT   Return Visit with ANTHONY Antonio CNP   [facility] ([facility])    [address] [place] [address] [phone]            Jun 28, 2017  2:00 PM CDT   Office Visit with Conchita Toledo [facility] ([facility])    [address] [place] [id number] [phone]            Bring a current list of meds and any records pertaining to this visit.  For Physicals, please bring immunization records and any forms needing to be filled out.  Please arrive 10 minutes early to complete paperwork.            Jul 05, 2017  2:00 PM CDT   Return Visit with ANTHONY Antonio CNP   [facility] ([facility])    [address]  [address]  [facility] [address]   [phone]            Aug 11, 2017  2:00 PM CDT   Return Visit with ANTHONY Antonio CNP   [facility] ([facility])    [address]  [address]  [facility] [address]   [phone]            Sep 06, 2017  2:00 PM CDT   Return Visit with ANTHONY Antonio CNP   [facility] ([facility])    [address]  [address]  [facility] [address]   [phone]            Oct 04, 2017  2:00 PM CDT   Return Visit with ANTHONY Antonio CNP   [facility] ([facility])    [address]  [address]  [facility] [address] [phone]              Who to contact     If you have questions or need follow up information about today's clinic visit or your schedule please contact [facility] directly at [phone].  Normal or non-critical lab and imaging results will be communicated to you by MyChart, letter or phone within 4 business days after the clinic has received the results. If you do not hear from us within 7 days, please contact the clinic through MyChart or phone. If you have a critical or abnormal lab result, we will notify you by phone as soon as possible.  Submit refill requests through [employer] or call your pharmacy and they will forward the refill request to us. Please allow 3 business days for your refill to be completed.          Additional Information About Your Visit        Care EveryWhere ID      This is your Care EveryWhere ID. This could be used by other organizations to access your [place] medical records  [id number]        Your Vitals Were     Pulse Pulse Oximetry                75 99%           Blood Pressure from Last 3 Encounters:   05/24/17 110/60   05/09/17 105/63   05/08/17 120/50    Weight from Last 3 Encounters:   04/27/17 199 lb 8 oz (90.5 kg)   03/15/17 201 lb (91.2 kg)   02/20/17 205 lb 4.8 oz (93.1 kg)              Today, you had the following     No orders found for display         Today's Medication Changes          These changes are accurate as of: 5/24/17 11:59 PM.  If you have any questions, ask your nurse or doctor.               These medicines have changed or have updated prescriptions.        Dose/Directions    ACCU-CHEK COMPACT PLUS test strip   This may have changed:  Another medication with the same name was removed. Continue taking this medication, and follow the directions you see here.   Generic drug:  blood glucose monitoring        Use to test blood sugar 3 times daily or as directed.  Ok to substitute alternative if insurance prefers.   Quantity:  100 strip   Refills:  prn       TraMADol HCl 50 MG Tbdp   Indication:  Moderate to Moderately Severe Pain   This may have changed:  when to take this   Used for:  Chronic shoulder pain, unspecified laterality        Dose:  50 mg   Take 50 mg by mouth 3 times daily as needed   Quantity:  90 tablet   Refills:  3         Stop taking these medicines if you haven't already. Please contact your care team if you have questions.     blood glucose monitoring lancets           blood glucose monitoring meter device kit                    Primary Care Provider Office Phone # Fax #    ANTHONY Antonio [phone]        [facility] [address] [facility] [address]  [facility] [address]        Thank you!     Thank you for choosing [facility]  for your care. Our goal is  always to provide you with excellent care. Hearing back from our patients is one way we can continue to improve our services. Please take a few minutes to complete the written survey that you may receive in the mail after your visit with us. Thank you!             Your Updated Medication List - Protect others around you: Learn how to safely use, store and throw away your medicines at www.disposemymeds.org.          This list is accurate as of: 5/24/17 11:59 PM.  Always use your most recent med list.                   Brand Name Dispense Instructions for use    ACCU-CHEK COMPACT PLUS test strip   Generic drug:  blood glucose monitoring     100 strip    Use to test blood sugar 3 times daily or as directed.  Ok to substitute alternative if insurance prefers.       ACETAMINOPHEN PO      Take 325-650 mg by mouth every 4 hours as needed.       alcohol swab prep pads     1 Box    Use to swab area of injection/yvon and vials as directed.       amLODIPine 5 MG tablet    NORVASC    90 tablet    Take 1 tablet (5 mg) by mouth daily as needed For systolic blood pressure > 160 when checked 1 hour after taking labetalol.       aspirin 81 MG chewable tablet     90 tablet    Take 1 tablet (81 mg) by mouth daily Starting 1 week after your kidney biopsy       cinacalcet 30 MG tablet    SENSIPAR    60 tablet    Take 1 tablet (30 mg) by mouth daily       clotrimazole 1 % cream    LOTRIMIN    40 g    Apply topically 2 times daily Apply to buttocks twice a day until rash resolved.       Cranberry 400 MG Tabs      Take 400 mg by mouth 2 times daily       cyanocobalamin 1000 MCG/ML injection    VITAMIN B12    3 mL    Inject 1 mL (1,000 mcg) into the muscle every 30 days       diphenoxylate-atropine 2.5-0.025 MG per tablet    LOMOTIL    50 tablet    Take 1 tablet by mouth 4 times daily as needed for diarrhea       famotidine 20 MG tablet    PEPCID    60 tablet    Take 1 tablet (20 mg) by mouth 2 times daily       gabapentin 300 MG capsule     NEURONTIN    180 capsule    Take 3 capsules (900 mg) by mouth 2 times daily For pain       GLUCAGON EMERGENCY 1 MG kit   Generic drug:  glucagon     1 each    Inject 1 mg into the muscle once       insulin pen needle 30G X 8 MM     100 each    Use as directed with Lantus       labetalol 300 MG tablet    NORMODYNE    120 tablet    Take 1 tablet (300mg) in the AM and 1/2 tablet (150mg) in the PM. Hold for systolic BP less than 120.       lactobacillus rhamnosus (GG) capsule     60 capsule    Take 1 capsule by mouth 2 times daily       LANTUS SOLOSTAR 100 UNIT/ML injection   Generic drug:  insulin glargine     15 mL    Inject 24 units under the skin once daily.       levothyroxine 50 MCG tablet    SYNTHROID    90 tablet    Take 1 tablet (50 mcg) by mouth daily       loratadine 10 MG tablet    CLARITIN    90 tablet    Take 1 tablet (10 mg) by mouth daily       meclizine 12.5 MG tablet    ANTIVERT    90 tablet    Take 1 tablet (12.5 mg) by mouth 3 times daily as needed for dizziness       NovoLOG FLEXPEN 100 UNIT/ML injection   Generic drug:  insulin aspart     15 mL    Inject 14u with breakfast and 10u with dinner. At bedtime use following sliding scale:  150-200 5u 201-250 7u 251-300 9u 301-350 11u 351+ 13u       NYSTOP 236869 UNIT/GM Powd   Generic drug:  nystatin     60 g    Externally apply 1 g topically 3 times daily as needed       ondansetron 4 MG ODT tab    ZOFRAN ODT    20 tablet    Take 1-2 tablets (4-8 mg) by mouth every 8 hours as needed for nausea       order for DME     3 catheter    Equipment being ordered: Johnson Catheters - 18 french       * order for DME     100 pad    Equipment being ordered: Blue Chux Please send to [facility]       * order for DME     1 each    Equipment being ordered: Glomera FX CPAP interface (nasal pillows), size XS.       * order for DME     12 each    Equipment being ordered: Silver Impregnated catheters HX of frequent UTI       pravastatin 10 MG tablet    PRAVACHOL    90  "tablet    Take 1 tablet (10 mg) by mouth daily       predniSONE 5 MG tablet    DELTASONE    90 tablet    Take 1 tablet (5 mg) by mouth daily       simethicone 125 MG Chew chewable tablet    MYLICON    120 tablet    Take 1 tablet (125 mg) by mouth as needed for intestinal gas       syringe/needle (disp) 22G X 1-1/2\" 3 ML Misc    B-D SYRINGE/NEEDLE    50 each    Inject 1 Syringe into the muscle every 30 days       * tacrolimus 0.5 MG capsule    PROGRAF - GENERIC EQUIVALENT    180 capsule    Take 1 capsule (0.5 mg) by mouth 2 times daily       * tacrolimus 1 MG capsule    PROGRAF - GENERIC EQUIVALENT    180 capsule    Take 1 capsule (1 mg) by mouth 2 times daily       TraMADol HCl 50 MG Tbdp     90 tablet    Take 50 mg by mouth 3 times daily as needed       TRILEPTAL 300 MG tablet   Generic drug:  OXcarbazepine     180 tablet    Take 1 tablet (300 mg) by mouth 2 times daily For pain       * Notice:  This list has 5 medication(s) that are the same as other medications prescribed for you. Read the directions carefully, and ask your doctor or other care provider to review them with you.      "

## 2017-05-24 NOTE — Clinical Note
Klaudia COLLINS, see note about catheter. Wants to change back to monthly if possible? Tari please mail AVS

## 2017-05-26 ENCOUNTER — TELEPHONE (OUTPATIENT)
Dept: TRANSPLANT | Facility: CLINIC | Age: 72
End: 2017-05-26

## 2017-05-26 NOTE — LETTER
[facility]  Room 2[address]200  [facility],  [address]  [address]  Tel [phone]  Toll Free [phone]                OUTPATIENT LABORATORY TEST ORDER    Patient Name: Gem Jade  Transplant Date: 10/27/1999 (Kidney)  YOB: 1945  Issue Date & Time:May 26, 74907:09 PM    [facility] MR:  [id number]  Exp. Date (1 year after date issued)      Diagnoses: Kidney Transplant (ICD-10  Z94.0)   Long term use of medications (ICD-10  Z79.899)     Lab results to be available on the same day drawn.   Patient should release information to the [facility], [facility].  Please fax to the Transplant Center at [phone].    Monthly    ?Hemogram and Platelet   ?Basic Metabolic Panel (Sodium, Potassium, Chloride, CO2, Creatinine, BUN, Glucose, Calcium)   ?/Tacrolimus/Prograf drug level (mail to [facility] - mailers and instructions provided by the patient)            Every 6 Months                                                       ?Urine for protein/creatinine       HIV, HBsAb, HBcAb, HCV  If you have any questions, please call [facility] at [phone] or [phone].    Please fax labs to [phone]

## 2017-05-26 NOTE — TELEPHONE ENCOUNTER
Patient's  called me earlier this week with questions regarding lab frequency and concerns about frequent catheter changes.  I reviewed with Dr. Elizabeth.  He recommended monthly transplant lab draws at this time and would recommend patient follow-up with PCP regarding concerns about catheter.  Will route this to transplant coordinator and LPN to send updated lab orders to .  Updated patient's  on the above.

## 2017-05-30 ENCOUNTER — TELEPHONE (OUTPATIENT)
Dept: FAMILY MEDICINE | Facility: CLINIC | Age: 72
End: 2017-05-30

## 2017-05-30 NOTE — TELEPHONE ENCOUNTER
Bhavana called with [facility] - stated that she was out to see patient last week to change cath and pt was complaining about bladder spasms. She started her meds for bladder spasms and pain. Bhavana called back on Thursday to see how she was doing, she said she was still having some pain but nothing that she wanted her to come out to change, meanwhile over the holiday weekend it got worse and she had called [facility] triage and a nurse had came out on Saterday to change cath and pain is still there even with the meds. They would like to know what they could try next to help.     Fwd to RN

## 2017-05-30 NOTE — TELEPHONE ENCOUNTER
Bhavana called again stating she noted some dried looking blood inside the catheter tubing.  Patient stated the last cath change did cause some pain.  Bhavana explained that this may have been due to some trauma.  She instructed spouse to keep an eye on this and call with any changes noted.    Wendie Harris RN

## 2017-05-30 NOTE — TELEPHONE ENCOUNTER
Spoke with SEKOU Bateman [facility] who states patient has been having bladder spasms since Tuesday.  The catheter was changed on Tuesday and it was changed again on Saturday to see if this would help decrease the spasms.  She has been taking Thenazopyrizine 95 mg BID OTC.  Urine has been yellow with no sediment when not taking this med and does change to a orange like color when taking OTC med.  Patient's VSS and afebrile.  She is drinking greater than 2L water.  No odor noted with the urine.    Cued pharmacy in case order for other med is warranted.    Routing to provider to review.    Wendie Harris RN

## 2017-05-31 DIAGNOSIS — Z79.60 LONG-TERM USE OF IMMUNOSUPPRESSANT MEDICATION: ICD-10-CM

## 2017-05-31 DIAGNOSIS — Z94.0 KIDNEY TRANSPLANT RECIPIENT: ICD-10-CM

## 2017-05-31 DIAGNOSIS — D84.9 IMMUNOSUPPRESSED STATUS (H): ICD-10-CM

## 2017-05-31 LAB
ERYTHROCYTE [DISTWIDTH] IN BLOOD BY AUTOMATED COUNT: 16.8 % (ref 10–15)
HCT VFR BLD AUTO: 34.9 % (ref 35–47)
HGB BLD-MCNC: 10.9 G/DL (ref 11.7–15.7)
MCH RBC QN AUTO: 31.2 PG (ref 26.5–33)
MCHC RBC AUTO-ENTMCNC: 31.2 G/DL (ref 31.5–36.5)
MCV RBC AUTO: 100 FL (ref 78–100)
PLATELET # BLD AUTO: 156 10E9/L (ref 150–450)
RBC # BLD AUTO: 3.49 10E12/L (ref 3.8–5.2)
WBC # BLD AUTO: 5.6 10E9/L (ref 4–11)

## 2017-05-31 PROCEDURE — 36415 COLL VENOUS BLD VENIPUNCTURE: CPT | Performed by: INTERNAL MEDICINE

## 2017-05-31 PROCEDURE — 80197 ASSAY OF TACROLIMUS: CPT | Performed by: INTERNAL MEDICINE

## 2017-05-31 PROCEDURE — 80048 BASIC METABOLIC PNL TOTAL CA: CPT | Performed by: INTERNAL MEDICINE

## 2017-05-31 PROCEDURE — 85027 COMPLETE CBC AUTOMATED: CPT | Performed by: INTERNAL MEDICINE

## 2017-05-31 PROCEDURE — 80195 ASSAY OF SIROLIMUS: CPT | Performed by: INTERNAL MEDICINE

## 2017-06-01 LAB
ANION GAP SERPL CALCULATED.3IONS-SCNC: 9 MMOL/L (ref 3–14)
BUN SERPL-MCNC: 37 MG/DL (ref 7–30)
CALCIUM SERPL-MCNC: 8 MG/DL (ref 8.5–10.1)
CHLORIDE SERPL-SCNC: 101 MMOL/L (ref 94–109)
CO2 SERPL-SCNC: 28 MMOL/L (ref 20–32)
CREAT SERPL-MCNC: 1.26 MG/DL (ref 0.52–1.04)
GFR SERPL CREATININE-BSD FRML MDRD: 42 ML/MIN/1.7M2
GLUCOSE SERPL-MCNC: 90 MG/DL (ref 70–99)
POTASSIUM SERPL-SCNC: 4.6 MMOL/L (ref 3.4–5.3)
SIROLIMUS BLD-MCNC: ABNORMAL UG/L (ref 5–15)
SODIUM SERPL-SCNC: 138 MMOL/L (ref 133–144)
TACROLIMUS BLD-MCNC: 3.2 UG/L (ref 5–15)
TME LAST DOSE: ABNORMAL H
TME LAST DOSE: ABNORMAL H

## 2017-06-01 NOTE — TELEPHONE ENCOUNTER
Family is requesting cath changes go back to 1x a month, I am fine with that, please let [facility] know, I assume bladder spasms are better since no mention of it in the last note     Klaudia MTZ

## 2017-06-07 ENCOUNTER — TELEPHONE (OUTPATIENT)
Dept: FAMILY MEDICINE | Facility: CLINIC | Age: 72
End: 2017-06-07

## 2017-06-07 ENCOUNTER — OFFICE VISIT (OUTPATIENT)
Dept: FAMILY MEDICINE | Facility: CLINIC | Age: 72
End: 2017-06-07
Payer: COMMERCIAL

## 2017-06-07 VITALS — SYSTOLIC BLOOD PRESSURE: 130 MMHG | HEART RATE: 80 BPM | DIASTOLIC BLOOD PRESSURE: 82 MMHG | OXYGEN SATURATION: 99 %

## 2017-06-07 DIAGNOSIS — M25.519 CHRONIC SHOULDER PAIN, UNSPECIFIED LATERALITY: ICD-10-CM

## 2017-06-07 DIAGNOSIS — E78.5 HYPERLIPIDEMIA LDL GOAL <100: ICD-10-CM

## 2017-06-07 DIAGNOSIS — Z94.0 KIDNEY REPLACED BY TRANSPLANT: ICD-10-CM

## 2017-06-07 DIAGNOSIS — I10 BENIGN ESSENTIAL HYPERTENSION: ICD-10-CM

## 2017-06-07 DIAGNOSIS — N39.0 RECURRENT UTI: ICD-10-CM

## 2017-06-07 DIAGNOSIS — G89.29 CHRONIC SHOULDER PAIN, UNSPECIFIED LATERALITY: ICD-10-CM

## 2017-06-07 DIAGNOSIS — E11.42 DIABETIC POLYNEUROPATHY ASSOCIATED WITH TYPE 2 DIABETES MELLITUS (H): Primary | ICD-10-CM

## 2017-06-07 DIAGNOSIS — E66.01 MORBID OBESITY, UNSPECIFIED OBESITY TYPE (H): ICD-10-CM

## 2017-06-07 PROCEDURE — 99350 HOME/RES VST EST HIGH MDM 60: CPT | Performed by: NURSE PRACTITIONER

## 2017-06-07 RX ORDER — PRAVASTATIN SODIUM 10 MG
TABLET ORAL
Qty: 90 TABLET | Refills: 3 | Status: ON HOLD | OUTPATIENT
Start: 2017-06-07 | End: 2018-05-24

## 2017-06-07 NOTE — TELEPHONE ENCOUNTER
Brenna called [facility] requesting OT orders. Requesting for 1w2.     Brenna can be reached with questions.

## 2017-06-07 NOTE — PROGRESS NOTES
SUBJECTIVE:                                                    Gem Jade is a 68 year old female who is seen in her home due to inability to leave the home unassisted, morbid obesity, severe vertigo, generalized weakness, chronic pain, bed bound, rarely able to get up in the , seen today for the following health issues:    Numerous issues with medications when coming home from the hospital the beginning of May things have stabilized now, see recent PharmD note: 5/24    Chief Complaint: Follow up from our visit on 5/9/17.    Tobacco: History of tobacco dependence - quit   Alcohol: not currently using     Medication Adherence: issues found and discussed below and has assistance setting up med boxes (Keshawn)     Neurogenic bladder: recently had rico catheter change and lidocaine not applied correctly, pt having pain which is slowly subsiding. Pt and  are frustrated with the new RN since catheter changes have not gone well with her. Concerns have been brought to the attention of the home care RN supervisor but unable to change RNs at this time. Also asking why catheter now needs to be changed Q2 weeks instead of monthly; would like it changed back if possible.      Hypertension: Currently not taking any medications since BP has been below goal range. Prescriptions include labetalol 300mg AM and 150mg PM, amlodipine and losartan.  Patient does self-monitor BP with wrist cuff, HCRN also monitors. Home BP monitoring in range of 's systolic over 30-70's diastolic.       Diabetes:  Pt currently taking Lantus 24 units daily, Novolog 14 units with meals BID when not testing BS and uses sliding scale 2 units for every 50 over 201. Has been doing better with remembering Novolog.  SMBG: one to two times daily.   Ranges (glucometer):      S/p kidney transplant: currently taking tacrolimus 1.5mg BID and prednisone 5mg daily. BMP and Tac level have been checked since last visit and reviewed by transplant  clinic, however pt and spouse remain unhappy labs are not being checked more often and concerned about creatinine still. Shantelle wants to know why SCr is not back at baseline of 0.8.     KIDNEY TRANSPLANT       Duration: right kidney replaced, left one non functional     Description (location/character/radiation): chronic managed by kidney transplant team     Intensity:  moderate    Accompanying signs and symptoms: fatigue,   History (similar episodes/previous evaluation):        Precipitating or alleviating factors: DM, bed bound     Therapies tried and outcome: see transplant team notes, they are in the process of monitoring her creatinine and adjusting her transplant meds             Diabetes Follow-up       Patient is checking blood sugars: once daily.  Results are as follows:     Diabetic concerns: frequent infections and other -  elevated sugars, HgbA1c increased to 7.4 12/16, recent increased lantus to 24, Novolog 14u 2x daily       Pt currently taking Lantus 24 units daily, Novolog 14 units with meals BID when not testing BS and uses sliding scale 2 units for every 50 over 201. Has been doing better with remembering Novolog.gars:     AM- fasting no readings     Sugars: PM- midnight readings 99, 91, 72, 155, 133, 98, 263, 202     Recent increase to 18U of Lantus, taking Novolog 2x daily    Trying to eat 2x daily, still not getting up until noon, naps after dinner, then up until 4am         Symptoms of hypoglycemia (low blood sugar): shaky, weak, lethargy, confusion, Symptoms occur if sugars < below 50.     Paresthesias (numbness or burning in feet) or sores: Yes hands and feet, using gabapentin with good relief     Date of last diabetic eye exam: unsure                   Amount of exercise or physical activity: None, bed bound, no longer getting up in her wheel chair, but planning on getting up once son and  learn to operate christiana     Problems taking medications regularly: No,  set them up and adm  them    Medication side effects: none  Diet: diabetic, appetite has improved, still eating a fair amount of simple carbs, following renal diet         Musculoskeletal problem/pain       Duration: Chronic, but stable     Description  Location: shoulders, left the worse     Intensity:  Moderate,2-7 /10    Accompanying signs and symptoms: numbness and weakness in UE    History  Previous similar problem: YES  Previous evaluation:  x-ray, MRI and orthopedic evaluation  RIGHT SHOULDER THREE VIEWS 5/19/2013 2:23 PM  HISTORY: Rule out fracture.  COMPARISON: 9/14/2012.  FINDINGS: Again seen are degenerative changes of the  acromioclavicular joint. The right shoulder is otherwise unremarkable  and unchanged.    Precipitating or alleviating factors:  Trauma or overuse: YES- uses her arms to turn herself in bed  Aggravating factors include: any use of UE  Therapies tried and outcome: using acetaminophen, using icy hot gel and tramadol rarely, working on core strength, still using chinese patches and working well              Hypertension Follow-up       Outpatient blood pressures are being checked by [facility], stable    No longer taking losartan or norvasc, takes labetalol if meets parameters    BP readings 141/73, 150/59, 148/76    Low Salt Diet: not monitoring salt        UTI - Female  Duration of complaint:  no more leaking, changing 2x a month but going back to 1x month with silver impregnated cath, no sx now         Problem list and histories reviewed & adjusted, as indicated.  Additional history: as documented    Recent Labs   Lab Test  05/31/17   1150  05/11/17   1140   04/26/17   0638   04/22/17   0942   04/19/17   0005   03/09/17   1310   12/05/16   1210   08/30/16   0032   06/13/16   1140   12/20/15   0853   02/05/15   1250   05/16/13   1240   A1C   --    --    --    --    --    --    --    --    --    --    --   7.4*   --    --    --   6.7*   --   5.5   < >  6.9*   < >  7.2*   LDL   --    --    --    --    --    --    --     --    --    --    --   64   --    --    --    --    --    --    --   18   --   149*   HDL   --    --    --    --    --    --    --    --    --    --    --   46*   --    --    --    --    --    --    --   43*   --   42*   TRIG   --    --    --    --    --    --    --    --    --    --    --   272*   --    --    --    --    --    --    --   223*   --   262*   ALT   --    --    --   23   --   42   --   44   < >   --    < >   --    < >  6   < >   --    < >   --    < >   --    < >   --    CR  1.26*  1.37*   < >  1.71*   < >  1.16*   < >  1.53*   < >   --    < >   --    < >   --    < >   --    < >  0.74   < >  0.67   < >  0.70   GFRESTIMATED  42*  38*   < >  29*   < >  46*   < >  34*   < >   --    < >   --    < >   --    < >   --    < >  78   < >  87   < >  84   GFRESTBLACK  51*  46*   < >  36*   < >  56*   < >  41*   < >   --    < >   --    < >   --    < >   --    < >  >90   GFR Calc     < >  >90   GFR Calc     < >  >90   POTASSIUM  4.6  3.9   < >  3.8   < >  3.5   < >  4.1   < >   --    < >   --    < >   --    < >   --    < >  3.3*   < >  4.3   < >  3.9   TSH   --    --    --    --    --    --    --    --    --   1.20   --    --    --   1.01   --    --    < >   --    --    --    < >   --     < > = values in this interval not displayed.      BP Readings from Last 3 Encounters:   06/07/17 130/82   05/24/17 110/60   05/09/17 105/63    Wt Readings from Last 3 Encounters:   04/27/17 199 lb 8 oz (90.5 kg)   03/15/17 201 lb (91.2 kg)   02/20/17 205 lb 4.8 oz (93.1 kg)            Labs reviewed in EPIC    Reviewed and updated as needed this visit by clinical staff       Reviewed and updated as needed this visit by Provider         ROS:  Constitutional, HEENT, cardiovascular, pulmonary, gi and gu systems are negative, except as otherwise noted.    OBJECTIVE:                                                    /82  Pulse 80  SpO2 99%  There is no height or weight on file to calculate  BMI.  GENERAL: alert, mile distress, obese, elderly and laying on her back in her bed, engaged in the conversation   RESP: lungs clear to auscultation - no rales, rhonchi or wheezes  CV: regular rate and rhythm, normal S1 S2, no S3 or S4, no murmur, click or rub, no peripheral edema and peripheral pulses strong  ABDOMEN: soft, nontender, no hepatosplenomegaly, no masses and bowel sounds normal  MS: no gross musculoskeletal defects noted, no edema  SKIN: no suspicious lesions or rashes  PSYCH: mentation appears normal, affect normal/bright    Diagnostic Test Results:  Results for orders placed or performed in visit on 05/31/17   CBC with platelets   Result Value Ref Range    WBC 5.6 4.0 - 11.0 10e9/L    RBC Count 3.49 (L) 3.8 - 5.2 10e12/L    Hemoglobin 10.9 (L) 11.7 - 15.7 g/dL    Hematocrit 34.9 (L) 35.0 - 47.0 %     78 - 100 fl    MCH 31.2 26.5 - 33.0 pg    MCHC 31.2 (L) 31.5 - 36.5 g/dL    RDW 16.8 (H) 10.0 - 15.0 %    Platelet Count 156 150 - 450 10e9/L   Basic metabolic panel   Result Value Ref Range    Sodium 138 133 - 144 mmol/L    Potassium 4.6 3.4 - 5.3 mmol/L    Chloride 101 94 - 109 mmol/L    Carbon Dioxide 28 20 - 32 mmol/L    Anion Gap 9 3 - 14 mmol/L    Glucose 90 70 - 99 mg/dL    Urea Nitrogen 37 (H) 7 - 30 mg/dL    Creatinine 1.26 (H) 0.52 - 1.04 mg/dL    GFR Estimate 42 (L) >60 mL/min/1.7m2    GFR Estimate If Black 51 (L) >60 mL/min/1.7m2    Calcium 8.0 (L) 8.5 - 10.1 mg/dL   Tacrolimus level   Result Value Ref Range    Tacrolimus Last Dose 12:00 AM 05/31/2017     Tacrolimus Level 3.2 (L) 5.0 - 15.0 ug/L   Sirolimus level   Result Value Ref Range    Sirolimus Last Dose 1200AM 05/31/2017     Sirolimus Level (L) 5.0 - 15.0 ug/L     <2.0  Sirolimus Reference Range   Kidney Transplant   Adult                          ug/L     6-12 months post transplant  8-10     >12 months post transplant   5-8     >5 years post transplant     4-6   Heart Transplant               4-10   Lung Transplant                 5-15   Hematopoietic Stem Cell Tx     3-12   BMT Pediatric                  3-12   This test was developed and its performance characteristics determined by the   [facility],  Special Chemistry Laboratory. It has   not been cleared or approved by the FDA. The laboratory is regulated under CLIA   as qualified to perform high-complexity testing. This test is used for clinical   purposes. It should not be regarded as investigational or for research.       *Note: Due to a large number of results and/or encounters for the requested time period, some results have not been displayed. A complete set of results can be found in Results Review.        ASSESSMENT/PLAN:                                                      (E11.42) Diabetic polyneuropathy associated with type 2 diabetes mellitus (H)  (primary encounter diagnosis)  Comment: still struggling with elevated sugars, insulin adjusted by PharmD   Plan: Hemoglobin A1c        Will repeat labs, discussed diet and using Novolog as directed     (M25.519,  G89.29) Chronic shoulder pain, unspecified laterality  Comment: Has increased slightly, using tramadol rarely  Plan: discussed using her chinese patches and topicals before tramadol    (E66.01) Morbid obesity, unspecified obesity type (H)  Comment: Gradual decrease in weight,   Plan: discussed consistent eating and movement, will try getting up in your chair     (Z94.0) Kidney replaced by transplant  Comment: Transplant meds monitored by her transplant team, levels stable  Plan: Basic metabolic panel  (Ca, Cl, CO2, Creat,         Gluc, K, Na, BUN)        Repeat labs     (I10) Benign essential hypertension  Comment: improved, rarely taking BP meds  Plan: monitor, have discontinued losartan and will look at discontinuing Norvasc since she has not used it since coming home from the hospital     (N39.0) Recurrent UTI  Comment: Using silver impregnated cath, no sx  Plan: will decrease cath  changes to 1x monthly    Patient Instructions   We will have some labs done next week, they will call you to set up an appt     Continue to work on your diet and good luck with getting up in your chair     No adjustments made in your meds today    I will see you again in one month, hopefully with our PharmD      Marivel Barcenas, ALISON, APRN [facility]    Visit time 60   min with > than 50% of the time spent in counseling on: care coordination, DM, kidney transplant, shoulder pain, obesity, recurrent UTI

## 2017-06-07 NOTE — Clinical Note
Labs ordered please have the clinical lab team draw them   Please see if Conchita can come to the next visit with me

## 2017-06-07 NOTE — MR AVS SNAPSHOT
After Visit Summary   6/7/2017    Gem Jade    MRN: [id number]           Patient Information     Date Of Birth          1945        Visit Information        Provider Department      6/7/2017 2:00 PM Marivel Barcenas APRN CNP [facility]        Today's Diagnoses     Diabetic polyneuropathy associated with type 2 diabetes mellitus (H)    -  1    Chronic shoulder pain, unspecified laterality        Morbid obesity, unspecified obesity type (H)        Kidney replaced by transplant        Benign essential hypertension        Recurrent UTI          Care Instructions    We will have some labs done next week, they will call you to set up an appt     Continue to work on your diet and good luck with getting up in your chair     No adjustments made in your meds today    I will see you again in one month, hopefully with our PharmD          Follow-ups after your visit        Your next 10 appointments already scheduled     Jun 28, 2017  2:00 PM CDT   Office Visit with Conchita Toledo, [facility] ([facility])    [address] [place] [address]  [address] [place] [id number] [phone]           Bring a current list of meds and any records pertaining to this visit.  For Physicals, please bring immunization records and any forms needing to be filled out.  Please arrive 10 minutes early to complete paperwork.            Jul 05, 2017  2:00 PM CDT   [facility] with ANTHONY Antonio CNP   [facility]    [address] [place] [address]  [address] [place] [id number] [phone]            Aug 11, 2017  2:00 PM CDT   Return Visit with ANTHONY Antonio CNP   [facility]    [address] [place] [address]  [address] [place] [id number] [phone]            Sep 06, 2017  2:00 PM CDT   Return Visit with Marivel  ANTHONY Gracia CNP   [facility] ([facility])    [address]  [address]  [facility] [address] [phone]            Oct 04, 2017  2:00 PM CDT   Return Visit with ANTHONY Antonio CNP   [facility]    [address]  [address]  [facility] [address] [phone]              Who to contact     If you have questions or need follow up information about today's clinic visit or your schedule please contact [facility] directly at [phone].  Normal or non-critical lab and imaging results will be communicated to you by Corticahart, letter or phone within 4 business days after the clinic has received the results. If you do not hear from us within 7 days, please contact the clinic through Corticahart or phone. If you have a critical or abnormal lab result, we will notify you by phone as soon as possible.  Submit refill requests through [employer] or call your pharmacy and they will forward the refill request to us. Please allow 3 business days for your refill to be completed.          Additional Information About Your Visit        Corticahart Information     [employer] gives you secure access to your electronic health record. If you see a primary care provider, you can also send messages to your care team and make appointments. If you have questions, please call your primary care clinic.  If you do not have a primary care provider, please call [phone] and they will assist you.        Care EveryWhere ID     This is your Care EveryWhere ID. This could be used by other organizations to access your [place] medical records  [id number]        Your Vitals Were     Pulse Pulse Oximetry                80 99%           Blood Pressure from Last 3 Encounters:   06/07/17 130/82   05/24/17 110/60   05/09/17 105/63    Weight from Last 3 Encounters:   04/27/17 199 lb 8 oz (90.5 kg)   03/15/17 201 lb  (91.2 kg)   02/20/17 205 lb 4.8 oz (93.1 kg)              We Performed the Following     Hemoglobin A1c          Today's Medication Changes          These changes are accurate as of: 6/7/17 11:59 PM.  If you have any questions, ask your nurse or doctor.               These medicines have changed or have updated prescriptions.        Dose/Directions    pravastatin 10 MG tablet   Commonly known as:  PRAVACHOL   This may have changed:  See the new instructions.   Used for:  Hyperlipidemia LDL goal <100   Changed by:  Marivel Barcenas APRN CNP        TAKE ONE TABLET BY MOUTH EVERY DAY   Quantity:  90 tablet   Refills:  3       TraMADol HCl 50 MG Tbdp   Indication:  Moderate to Moderately Severe Pain   This may have changed:  when to take this   Used for:  Chronic shoulder pain, unspecified laterality        Dose:  50 mg   Take 50 mg by mouth 3 times daily as needed   Quantity:  90 tablet   Refills:  3            Where to get your medicines      These medications were sent to [facility] - [address]  [address] [facility] [address]    Hours:  Mon-Fri 8:30am-5:00pm Toll Free [phone] Phone:  [phone]     pravastatin 10 MG tablet                Primary Care Provider Office Phone # Fax #    ANTHONY Antonio [phone]       [facility] [address] [facility] [address]  [facility] [address]        Thank you!     Thank you for choosing [facility]  for your care. Our goal is always to provide you with excellent care. Hearing back from our patients is one way we can continue to improve our services. Please take a few minutes to complete the written survey that you may receive in the mail after your visit with us. Thank you!             Your Updated Medication List - Protect others around you: Learn how to safely use, store and throw away your medicines at www.disposemymeds.org.           This list is accurate as of: 6/7/17 11:59 PM.  Always use your most recent med list.                   Brand Name Dispense Instructions for use    ACCU-CHEK COMPACT PLUS test strip   Generic drug:  blood glucose monitoring     100 strip    Use to test blood sugar 3 times daily or as directed.  Ok to substitute alternative if insurance prefers.       ACETAMINOPHEN PO      Take 325-650 mg by mouth every 4 hours as needed.       alcohol swab prep pads     1 Box    Use to swab area of injection/yvon and vials as directed.       amLODIPine 5 MG tablet    NORVASC    90 tablet    Take 1 tablet (5 mg) by mouth daily as needed For systolic blood pressure > 160 when checked 1 hour after taking labetalol.       aspirin 81 MG chewable tablet     90 tablet    Take 1 tablet (81 mg) by mouth daily Starting 1 week after your kidney biopsy       cinacalcet 30 MG tablet    SENSIPAR    60 tablet    Take 1 tablet (30 mg) by mouth daily       clotrimazole 1 % cream    LOTRIMIN    40 g    Apply topically 2 times daily Apply to buttocks twice a day until rash resolved.       Cranberry 400 MG Tabs      Take 400 mg by mouth 2 times daily       cyanocobalamin 1000 MCG/ML injection    VITAMIN B12    3 mL    Inject 1 mL (1,000 mcg) into the muscle every 30 days       diphenoxylate-atropine 2.5-0.025 MG per tablet    LOMOTIL    50 tablet    Take 1 tablet by mouth 4 times daily as needed for diarrhea       famotidine 20 MG tablet    PEPCID    60 tablet    Take 1 tablet (20 mg) by mouth 2 times daily       gabapentin 300 MG capsule    NEURONTIN    180 capsule    Take 3 capsules (900 mg) by mouth 2 times daily For pain       GLUCAGON EMERGENCY 1 MG kit   Generic drug:  glucagon     1 each    Inject 1 mg into the muscle once       insulin pen needle 30G X 8 MM     100 each    Use as directed with Lantus       labetalol 300 MG tablet    NORMODYNE    120 tablet    Take 1 tablet (300mg) in the AM and 1/2 tablet (150mg) in the PM. Hold for systolic  "BP less than 120.       lactobacillus rhamnosus (GG) capsule     60 capsule    Take 1 capsule by mouth 2 times daily       LANTUS SOLOSTAR 100 UNIT/ML injection   Generic drug:  insulin glargine     15 mL    Inject 24 units under the skin once daily.       levothyroxine 50 MCG tablet    SYNTHROID    90 tablet    Take 1 tablet (50 mcg) by mouth daily       meclizine 12.5 MG tablet    ANTIVERT    90 tablet    Take 1 tablet (12.5 mg) by mouth 3 times daily as needed for dizziness       NovoLOG FLEXPEN 100 UNIT/ML injection   Generic drug:  insulin aspart     15 mL    Inject 14u with breakfast and 10u with dinner. At bedtime use following sliding scale:  150-200 5u 201-250 7u 251-300 9u 301-350 11u 351+ 13u       NYSTOP [id number] UNIT/GM Powd   Generic drug:  nystatin     60 g    Externally apply 1 g topically 3 times daily as needed       ondansetron 4 MG ODT tab    ZOFRAN ODT    20 tablet    Take 1-2 tablets (4-8 mg) by mouth every 8 hours as needed for nausea       order for DME     3 catheter    Equipment being ordered: [employer] Catheters - 18 french       * order for DME     100 pad    Equipment being ordered: Blue Chux Please send to [facility] [employer]       * order for DME     1 each    Equipment being ordered: [employer] FX CPAP interface (nasal pillows), size XS.       * order for DME     12 each    Equipment being ordered: Silver Impregnated catheters HX of frequent UTI       pravastatin 10 MG tablet    PRAVACHOL    90 tablet    TAKE ONE TABLET BY MOUTH EVERY DAY       predniSONE 5 MG tablet    DELTASONE    90 tablet    Take 1 tablet (5 mg) by mouth daily       simethicone 125 MG Chew chewable tablet    MYLICON    120 tablet    Take 1 tablet (125 mg) by mouth as needed for intestinal gas       syringe/needle (disp) 22G X 1-1/2\" 3 ML Misc    B-D SYRINGE/NEEDLE    50 each    Inject 1 Syringe into the muscle every 30 days       * tacrolimus 0.5 MG capsule    PROGRAF - GENERIC EQUIVALENT    180 capsule    Take 1 capsule (0.5 mg) " by mouth 2 times daily       * tacrolimus 1 MG capsule    PROGRAF - GENERIC EQUIVALENT    180 capsule    Take 1 capsule (1 mg) by mouth 2 times daily       TraMADol HCl 50 MG Tbdp     90 tablet    Take 50 mg by mouth 3 times daily as needed       TRILEPTAL 300 MG tablet   Generic drug:  OXcarbazepine     180 tablet    Take 1 tablet (300 mg) by mouth 2 times daily For pain       * Notice:  This list has 5 medication(s) that are the same as other medications prescribed for you. Read the directions carefully, and ask your doctor or other care provider to review them with you.

## 2017-06-07 NOTE — TELEPHONE ENCOUNTER
Left voicemail for Brenna, OT [facility] and gave verbal ok on orders requested per RN protocol.    Wendie Harris RN

## 2017-06-08 DIAGNOSIS — J00 ACUTE NASOPHARYNGITIS: ICD-10-CM

## 2017-06-08 RX ORDER — LORATADINE 10 MG/1
10 TABLET ORAL DAILY
Qty: 90 TABLET | Refills: 3 | Status: SHIPPED | OUTPATIENT
Start: 2017-06-08 | End: 2018-08-06

## 2017-06-13 ENCOUNTER — TELEPHONE (OUTPATIENT)
Dept: FAMILY MEDICINE | Facility: CLINIC | Age: 72
End: 2017-06-13

## 2017-06-13 NOTE — TELEPHONE ENCOUNTER
STEPH Ceja from [facility] calling for orders for OT 2 x per month for 2 months.

## 2017-06-15 ENCOUNTER — TELEPHONE (OUTPATIENT)
Dept: FAMILY MEDICINE | Facility: CLINIC | Age: 72
End: 2017-06-15

## 2017-06-15 NOTE — TELEPHONE ENCOUNTER
Bhavana called from [facility] requesting orders. Requesting skilled nursing 1m2, 7 PRN. HHA 1w1, 2w8. Also wanting to continue with Cath orders and Vit B12 monthly.     Bhavana can be reached with verbal

## 2017-06-16 ENCOUNTER — TELEPHONE (OUTPATIENT)
Dept: FAMILY MEDICINE | Facility: CLINIC | Age: 72
End: 2017-06-16

## 2017-06-16 DIAGNOSIS — E56.9 VITAMIN DEFICIENCY: Primary | ICD-10-CM

## 2017-06-16 DIAGNOSIS — E11.3299 DIABETES MELLITUS WITH BACKGROUND RETINOPATHY (H): ICD-10-CM

## 2017-06-19 RX ORDER — CHOLECALCIFEROL (VITAMIN D3) 50 MCG
2000 TABLET ORAL 2 TIMES DAILY
Qty: 100 TABLET | Refills: 3 | COMMUNITY
Start: 2017-06-19 | End: 2017-09-28

## 2017-06-19 NOTE — TELEPHONE ENCOUNTER
I would recommend she stay on Vit D.  I would like her levels to be around 55 and since she gets little to no sunlight exposure  she should take 5,000IU daily, I believe she was taking 4,000IU daily in 2 divided doses, I can order it or they can buy it OTC, I will put it on her med list     Klaudia MTZ

## 2017-06-19 NOTE — TELEPHONE ENCOUNTER
Spoke with Chang who said they don't know if Vit D supplements are still necessary. He said Klaudia is the one who has recommended Shantelle take Vit D in the past. I do not see it listed on her med list as a present or historical med.    The last time her Vit D was checked was when she was hospitalized in 4/2017. It was 28 (20-75). He is not sure what dose she is currently taking, but they get it OTC and she is taking 1 tablet daily (thinks it is either 1,000 or 2,000 IU).    Advised pt's  that Klaudia is on vacation and that we will get back to them when she is back.    Chang also said they need Shantelle's Lantus refilled. She is down to her last pen today. I spoke with Fritz Arango and confirmed her dose of 24 units daily. Sent new order to  Mail order pharmacy, per Chang's request. Pharmacy tech said they received it and will be calling pt this afternoon to set up delivery.    Madeline Flores RN

## 2017-06-21 ENCOUNTER — TELEPHONE (OUTPATIENT)
Dept: FAMILY MEDICINE | Facility: CLINIC | Age: 72
End: 2017-06-21

## 2017-06-21 NOTE — TELEPHONE ENCOUNTER
Called Chang back and advised him of Klaudia's recommendation re: Vit D below.  Sent to pharmacy per alice from Klaudia Barcenas NP.    Madeline Flores RN

## 2017-06-21 NOTE — TELEPHONE ENCOUNTER
Luana at [employer] emailed that Mukesh, phlebotomist, went to pt's home at 12:30 and no one answered the door.   Called pt's  who said it was their fault.    I responded to Luana and she will reschedule for tomorrow.    Madeline Flores RN

## 2017-06-22 DIAGNOSIS — Z94.0 KIDNEY REPLACED BY TRANSPLANT: ICD-10-CM

## 2017-06-22 PROCEDURE — 36415 COLL VENOUS BLD VENIPUNCTURE: CPT | Performed by: NURSE PRACTITIONER

## 2017-06-22 PROCEDURE — 80048 BASIC METABOLIC PNL TOTAL CA: CPT | Performed by: NURSE PRACTITIONER

## 2017-06-23 ENCOUNTER — TELEPHONE (OUTPATIENT)
Dept: FAMILY MEDICINE | Facility: CLINIC | Age: 72
End: 2017-06-23

## 2017-06-23 LAB
ANION GAP SERPL CALCULATED.3IONS-SCNC: 9 MMOL/L (ref 3–14)
BUN SERPL-MCNC: 27 MG/DL (ref 7–30)
CALCIUM SERPL-MCNC: 7.9 MG/DL (ref 8.5–10.1)
CHLORIDE SERPL-SCNC: 105 MMOL/L (ref 94–109)
CO2 SERPL-SCNC: 27 MMOL/L (ref 20–32)
CREAT SERPL-MCNC: 1.14 MG/DL (ref 0.52–1.04)
GFR SERPL CREATININE-BSD FRML MDRD: 47 ML/MIN/1.7M2
GLUCOSE SERPL-MCNC: 69 MG/DL (ref 70–99)
POTASSIUM SERPL-SCNC: 4.5 MMOL/L (ref 3.4–5.3)
SODIUM SERPL-SCNC: 141 MMOL/L (ref 133–144)

## 2017-06-23 NOTE — TELEPHONE ENCOUNTER
Chang called wanting the results for the blood draw on his wife yesterday. I informed I would pass message along and have the RN give a call back.     Fwd to RN

## 2017-06-23 NOTE — TELEPHONE ENCOUNTER
Blood sugar was a little low, but kidney function has improved in the past couple of months.   Just awaiting Result Note from Klaudia to advise the patient and her .    Madeline Flores RN

## 2017-06-27 ENCOUNTER — DOCUMENTATION ONLY (OUTPATIENT)
Dept: LAB | Facility: CLINIC | Age: 72
End: 2017-06-27

## 2017-06-28 ENCOUNTER — OFFICE VISIT (OUTPATIENT)
Dept: PHARMACY | Facility: CLINIC | Age: 72
End: 2017-06-28
Payer: COMMERCIAL

## 2017-06-28 VITALS — HEART RATE: 66 BPM | SYSTOLIC BLOOD PRESSURE: 120 MMHG | OXYGEN SATURATION: 96 % | DIASTOLIC BLOOD PRESSURE: 44 MMHG

## 2017-06-28 DIAGNOSIS — I10 BENIGN ESSENTIAL HYPERTENSION: Primary | ICD-10-CM

## 2017-06-28 DIAGNOSIS — E11.3299 DIABETES MELLITUS WITH BACKGROUND RETINOPATHY (H): ICD-10-CM

## 2017-06-28 DIAGNOSIS — Z94.0 KIDNEY REPLACED BY TRANSPLANT: ICD-10-CM

## 2017-06-28 PROCEDURE — 99607 MTMS BY PHARM ADDL 15 MIN: CPT | Performed by: PHARMACIST

## 2017-06-28 PROCEDURE — 99606 MTMS BY PHARM EST 15 MIN: CPT | Performed by: PHARMACIST

## 2017-06-28 RX ORDER — LABETALOL 300 MG/1
150 TABLET, FILM COATED ORAL 2 TIMES DAILY
Qty: 120 TABLET | Refills: 3 | COMMUNITY
Start: 2017-06-28 | End: 2018-03-29

## 2017-06-28 NOTE — PROGRESS NOTES
SUBJECTIVE/OBJECTIVE:                Gem Jade is a 71 year old female seen at their home for a follow-up visit for Medication Therapy Management.  She was referred to me from Marivel Barcenas.  Keshawn also present for some of the visit.    Chief Complaint: Follow up from our visit on 5/24/17.    Tobacco: History of tobacco dependence - quit   Alcohol: not currently using    Medication Adherence: issues found and discussed below and has assistance setting up med boxes (Keshawn)    Hypertension: Currently taking labetalol 150mg PRN >120/60, having to take one dose every other day. Lowest readings 90s systolic and 30s diastolic once.. Has not taken any losartan or amlodipine.  Patient does self-monitor BP with wrist cuff, HCRN also monitors. Home BP monitoring in range of 120-150's systolic over 40-70's diastolic.  Denies any SE to labetalol, specifically vertigo has been better    Diabetes:  Pt currently taking Lantus 24 units daily, Novolog 10 units with meals BID and uses sliding scale 2 units for every 50 over 201. Confused if supposed to add sliding scale to scheduled 10u dose. Has been doing better with remembering Novolog.  SMBG:  two - three times daily.   Ranges (glucometer-not set up with date/time, able to get a replacement Accu-chek Compact): 60 lowest, 300 highest. Usually .   Symptoms of low blood sugar? none. Frequency of hypoglycemia? Rarely, one since last visit.  Recent symptoms of high blood sugar? none  Pt is not taking an ACEi/ARB. (Hx worsening renal function when on ACE/ARB therapy)  Aspirin: taking aspirin 81mg daily without problems  Diet/exercise: has been eating 2 times a day, appetite is normal. Thinks appetite is back to baseline prior to portion restriction. Bagel and juice for breakfast. She is bed bound, no exercise. Has been able to get out of bed twice recently.    Current labs include:  BP Readings from Last 3 Encounters:   06/07/17 130/82   05/24/17 110/60    05/09/17 105/63     Today's Vitals: /44  Pulse 66  SpO2 96%  Lab Results   Component Value Date    A1C 7.4 12/05/2016   .  Lab Results   Component Value Date    CHOL 164 12/05/2016     Lab Results   Component Value Date    TRIG 272 12/05/2016     Lab Results   Component Value Date    HDL 46 12/05/2016     Lab Results   Component Value Date    LDL 64 12/05/2016       Liver Function Studies -   Recent Labs   Lab Test  04/26/17   0638   PROTTOTAL  7.3   ALBUMIN  3.0*   BILITOTAL  0.6   ALKPHOS  72   AST  20   ALT  23       Lab Results   Component Value Date    UCRR 27 04/26/2017    MICROL 1850 04/26/2017    UMALCR 6776.56 (H) 04/26/2017       Last Basic Metabolic Panel:  Lab Results   Component Value Date     06/22/2017      Lab Results   Component Value Date    POTASSIUM 4.5 06/22/2017     Lab Results   Component Value Date    CHLORIDE 105 06/22/2017     Lab Results   Component Value Date    BUN 27 06/22/2017     Lab Results   Component Value Date    CR 1.14 06/22/2017     GFR Estimate   Date Value Ref Range Status   06/22/2017 47 (L) >60 mL/min/1.7m2 Final     Comment:     Non  GFR Calc   05/31/2017 42 (L) >60 mL/min/1.7m2 Final     Comment:     Non  GFR Calc   05/11/2017 38 (L) >60 mL/min/1.7m2 Final     Comment:     Non  GFR Calc     GFR Estimate If Black   Date Value Ref Range Status   06/22/2017 57 (L) >60 mL/min/1.7m2 Final     Comment:      GFR Calc   05/31/2017 51 (L) >60 mL/min/1.7m2 Final     Comment:      GFR Calc   05/11/2017 46 (L) >60 mL/min/1.7m2 Final     Comment:      GFR Calc     TSH   Date Value Ref Range Status   03/09/2017 1.20 0.40 - 4.00 mU/L Final   ]    Most Recent Immunizations   Administered Date(s) Administered     Hepatitis B 01/13/2003     Influenza (H1N1) 11/25/2009     Influenza (High Dose) 3 valent vaccine 10/07/2016     Influenza (IIV3) 11/05/2011     Influenza Vaccine IM  3yrs+ 4 Valent IIV4 10/16/2013     Pneumococcal (PCV 13) 12/01/2015     Pneumococcal 23 valent 06/17/2013     TDAP Vaccine (Adacel) 06/17/2013     Zoster vaccine, live 06/17/2013       ASSESSMENT:              Current medications were reviewed today as discussed above.      Medication Adherence: no issues identified    Hypertension: needs improvement. BP is not at goal <140/90 mmHg. Patient would benefit from taking labetalol scheduled - will reduce dose to 150mg BID to hopefully avoid hypotension. Will d/c amlodipine, not using.    Diabetes: stable. A1c at goal <8%. Average SMBG is at goal between  mg/dL. Reviewed use of Novolog sliding scale.      PLAN:                  D/c amlodipine.   Take labetalol 150mg BID, hold for systolic BP <120    I spent 60 minutes with this patient today.  All changes were made via collaborative practice agreement with Marivel Barcenas. A copy of the visit note was provided to the patient's primary care provider.     Will follow up in 1 month.    The patient was mailed a summary of these recommendations as an after visit summary.    Yolanda Becerra Pharm D4 Student    Conchita Toledo, VivianD, BCACP

## 2017-06-28 NOTE — MR AVS SNAPSHOT
After Visit Summary   6/28/2017    Gem Jade    MRN: [id number]           Patient Information     Date Of Birth          1945        Visit Information        Provider Department      6/28/2017 2:00 PM Conchita Toledo, [facility]        Today's Diagnoses     Benign essential hypertension    -  1    Kidney replaced by transplant        Diabetes mellitus with background retinopathy          Care Instructions    Recommendations from today's MTM visit:                                                      1. Discontinue taking amlodipine.     2. Take labetalol 150 mg by mouth twice daily. Hold if systolic blood pressure less than 120      Next MTM visit: 07/26/2017    To schedule another MTM appointment, please call the clinic directly or you may call the MTM scheduling line at [phone] or toll-free at [phone].     My Clinical Pharmacist's contact information:                                                      It was a pleasure seeing you today!  Please feel free to contact me with any questions or concerns you have.      Conchita Toledo, PharmD, BCACP    You may receive a survey about the MT services you received.  I would appreciate your feedback to help me serve you better in the future. Please fill it out and return it when you can. Your comments will be anonymous.                  Follow-ups after your visit        Your next 10 appointments already scheduled     Jul 05, 2017  2:00 PM CDT   Return Visit with ANTHONY Antonio CNP   [facility] ([facility])    [address] [place] [id number] [phone]            Jul 26, 2017  2:00 PM CDT   Office Visit with Conchita Toledo [facility] ([facility])    [address] [place] [id number] [phone]           Bring a  current list of meds and any records pertaining to this visit.  For Physicals, please bring immunization records and any forms needing to be filled out.  Please arrive 10 minutes early to complete paperwork.            Aug 11, 2017  2:00 PM CDT   Return Visit with ANTHONY Antonio CNP   [facility] ([facility])    [address]  [address]  [facility] [address]   [phone]            Sep 06, 2017  2:00 PM CDT   Return Visit with ANTHONY Antonio CNP   [facility]    [address]  [address]  [facility] [place] [address]   [phone]            Oct 04, 2017  2:00 PM CDT   Return Visit with ANTHONY Antonio CNP   [facility] ([facility])    [address]  [address]  [facility] [place] [address]   [phone]              Who to contact     If you have questions or need follow up information about today's clinic visit or your schedule please contact [facility] directly at [phone].  Normal or non-critical lab and imaging results will be communicated to you by MyChart, letter or phone within 4 business days after the clinic has received the results. If you do not hear from us within 7 days, please contact the clinic through [employer] or phone. If you have a critical or abnormal lab result, we will notify you by phone as soon as possible.  Submit refill requests through [employer] or call your pharmacy and they will forward the refill request to us. Please allow 3 business days for your refill to be completed.          Additional Information About Your Visit        [employer] Information     [employer] gives you secure access to your electronic health record. If you see a primary care provider, you can also send messages to your care team and make appointments. If you have questions, please call your primary care clinic.  If  you do not have a primary care provider, please call [phone] and they will assist you.        Care EveryWhere ID     This is your Care EveryWhere ID. This could be used by other organizations to access your [place] medical records  [id number]        Your Vitals Were     Pulse Pulse Oximetry                66 96%           Blood Pressure from Last 3 Encounters:   06/28/17 120/44   06/07/17 130/82   05/24/17 110/60    Weight from Last 3 Encounters:   04/27/17 199 lb 8 oz (90.5 kg)   03/15/17 201 lb (91.2 kg)   02/20/17 205 lb 4.8 oz (93.1 kg)              Today, you had the following     No orders found for display         Today's Medication Changes          These changes are accurate as of: 6/28/17 11:59 PM.  If you have any questions, ask your nurse or doctor.               These medicines have changed or have updated prescriptions.        Dose/Directions    labetalol 300 MG tablet   Commonly known as:  NORMODYNE   This may have changed:  additional instructions   Used for:  Benign essential hypertension, Kidney replaced by transplant        Take 1/2 tablet (150mg) BID. Hold for systolic BP less than 120.   Quantity:  120 tablet   Refills:  3       TraMADol HCl 50 MG Tbdp   Indication:  Moderate to Moderately Severe Pain   This may have changed:  when to take this   Used for:  Chronic shoulder pain, unspecified laterality        Dose:  50 mg   Take 50 mg by mouth 3 times daily as needed   Quantity:  90 tablet   Refills:  3                Primary Care Provider Office Phone # Fax #    Marivel Naomi Barcenas, APRN [facility] [phone]        [facility] [address] [facility] [address]  [facility] [address]        Equal Access to Services     [facility] AH: Hadii jairo carranza Sokaleihg, waaxda luqadaha, qaybta kaalmada jessica jimenez. So [facility] [phone].    ATENCIÓN: Si habla español, tiene a peguero disposición servicios gratuitos de asistencia lingüística.  Carmine gambino [phone].    We comply with applicable federal civil rights laws and [place] laws. We do not discriminate on the basis of race, color, national origin, age, disability sex, sexual orientation or gender identity.            Thank you!     Thank you for choosing [facility]  for your care. Our goal is always to provide you with excellent care. Hearing back from our patients is one way we can continue to improve our services. Please take a few minutes to complete the written survey that you may receive in the mail after your visit with us. Thank you!             Your Updated Medication List - Protect others around you: Learn how to safely use, store and throw away your medicines at www.disposemymeds.org.          This list is accurate as of: 6/28/17 11:59 PM.  Always use your most recent med list.                   Brand Name Dispense Instructions for use Diagnosis    ACCU-CHEK COMPACT PLUS test strip   Generic drug:  blood glucose monitoring     100 strip    Use to test blood sugar 3 times daily or as directed.  Ok to substitute alternative if insurance prefers.        ACETAMINOPHEN PO      Take 325-650 mg by mouth every 4 hours as needed.        alcohol swab prep pads     1 Box    Use to swab area of injection/yvon and vials as directed.    Type 2 diabetes, HbA1C goal < 8% (H)       aspirin 81 MG chewable tablet     90 tablet    Take 1 tablet (81 mg) by mouth daily Starting 1 week after your kidney biopsy    Pulmonary embolism and infarction (H), Diabetes mellitus with background retinopathy (H)       cinacalcet 30 MG tablet    SENSIPAR    60 tablet    Take 1 tablet (30 mg) by mouth daily    Kidney replaced by transplant, Hypercalcemia       clotrimazole 1 % cream    LOTRIMIN    40 g    Apply topically 2 times daily Apply to buttocks twice a day until rash resolved.        Cranberry 400 MG Tabs      Take 400 mg by mouth 2 times daily        cyanocobalamin 1000 MCG/ML  injection    VITAMIN B12    3 mL    Inject 1 mL (1,000 mcg) into the muscle every 30 days    Iron deficiency anemia, unspecified iron deficiency anemia type       diphenoxylate-atropine 2.5-0.025 MG per tablet    LOMOTIL    50 tablet    Take 1 tablet by mouth 4 times daily as needed for diarrhea    Diarrhea of presumed infectious origin       famotidine 20 MG tablet    PEPCID    60 tablet    Take 1 tablet (20 mg) by mouth 2 times daily        gabapentin 300 MG capsule    NEURONTIN    180 capsule    Take 3 capsules (900 mg) by mouth 2 times daily For pain    Diabetic polyneuropathy associated with type 2 diabetes mellitus (H)       GLUCAGON EMERGENCY 1 MG kit   Generic drug:  glucagon     1 each    Inject 1 mg into the muscle once    Hypoglycemia       insulin glargine 100 UNIT/ML injection    LANTUS SOLOSTAR    15 mL    Inject 24 Units Subcutaneous daily    Diabetes mellitus with background retinopathy (H)       insulin pen needle 30G X 8 MM     100 each    Use as directed with Lantus    Type 2 diabetes mellitus with diabetic nephropathy (H)       labetalol 300 MG tablet    NORMODYNE    120 tablet    Take 1/2 tablet (150mg) BID. Hold for systolic BP less than 120.    Benign essential hypertension, Kidney replaced by transplant       lactobacillus rhamnosus (GG) capsule     60 capsule    Take 1 capsule by mouth 2 times daily    Diarrhea       levothyroxine 50 MCG tablet    SYNTHROID    90 tablet    Take 1 tablet (50 mcg) by mouth daily    Other specified hypothyroidism       loratadine 10 MG tablet    CLARITIN    90 tablet    Take 1 tablet (10 mg) by mouth daily    Acute nasopharyngitis       meclizine 12.5 MG tablet    ANTIVERT    90 tablet    Take 1 tablet (12.5 mg) by mouth 3 times daily as needed for dizziness    Disorientation       NovoLOG FLEXPEN 100 UNIT/ML injection   Generic drug:  insulin aspart     15 mL    Inject 14u with breakfast and 10u with dinner. At bedtime use following sliding scale:  150-200 5u  "201-250 7u 251-300 9u 301-350 11u 351+ 13u    Diabetes mellitus with background retinopathy (H)       NYSTOP 124871 UNIT/GM Powd   Generic drug:  nystatin     60 g    Externally apply 1 g topically 3 times daily as needed    Fungal infection       ondansetron 4 MG ODT tab    ZOFRAN ODT    20 tablet    Take 1-2 tablets (4-8 mg) by mouth every 8 hours as needed for nausea    Nausea       order for DME     3 catheter    Equipment being ordered: Johnson Catheters - 18 french    Johnson catheter status       * order for DME     100 pad    Equipment being ordered: Blue Chux Please send to [facility]    Fecal incontinence       * order for DME     1 each    Equipment being ordered: "[employer] FX CPAP interface (nasal pillows), size XS.    Obstructive sleep apnea syndrome       * order for DME     12 each    Equipment being ordered: Silver Impregnated catheters HX of frequent UTI    Recurrent UTI       pravastatin 10 MG tablet    PRAVACHOL    90 tablet    TAKE ONE TABLET BY MOUTH EVERY DAY    Hyperlipidemia LDL goal <100       predniSONE 5 MG tablet    DELTASONE    90 tablet    Take 1 tablet (5 mg) by mouth daily    Kidney replaced by transplant       simethicone 125 MG Chew chewable tablet    MYLICON    120 tablet    Take 1 tablet (125 mg) by mouth as needed for intestinal gas        syringe/needle (disp) 22G X 1-1/2\" 3 ML Misc    B-D SYRINGE/NEEDLE    50 each    Inject 1 Syringe into the muscle every 30 days    Iron deficiency anemia, unspecified iron deficiency anemia type       * tacrolimus 0.5 MG capsule    PROGRAF - GENERIC EQUIVALENT    180 capsule    Take 1 capsule (0.5 mg) by mouth 2 times daily    Kidney transplant recipient       * tacrolimus 1 MG capsule    PROGRAF - GENERIC EQUIVALENT    180 capsule    Take 1 capsule (1 mg) by mouth 2 times daily    Kidney replaced by transplant       TraMADol HCl 50 MG Tbdp     90 tablet    Take 50 mg by mouth 3 times daily as needed    Chronic shoulder pain, unspecified laterality "       TRILEPTAL 300 MG tablet   Generic drug:  OXcarbazepine     180 tablet    Take 1 tablet (300 mg) by mouth 2 times daily For pain    Diabetic polyneuropathy associated with diabetes mellitus due to underlying condition (H)       * vitamin D 2000 UNITS tablet     100 tablet    Take 2,000 Units by mouth 2 times daily    Vitamin deficiency       * Cholecalciferol 5000 UNITS Tabs    ELMER-RUL VITAMIN D    90 tablet    Take 5,000 IU daily    Vitamin deficiency       * Notice:  This list has 7 medication(s) that are the same as other medications prescribed for you. Read the directions carefully, and ask your doctor or other care provider to review them with you.

## 2017-06-29 NOTE — PATIENT INSTRUCTIONS
Recommendations from today's MTM visit:                                                      1. Discontinue taking amlodipine.     2. Take labetalol 150 mg by mouth twice daily. Hold if systolic blood pressure less than 120      Next MTM visit: 07/26/2017    To schedule another MTM appointment, please call the clinic directly or you may call the MTM scheduling line at [phone] or toll-free at [phone].     My Clinical Pharmacist's contact information:                                                      It was a pleasure seeing you today!  Please feel free to contact me with any questions or concerns you have.      Conchita Toledo, PharmD, BCACP    You may receive a survey about the MTM services you received.  I would appreciate your feedback to help me serve you better in the future. Please fill it out and return it when you can. Your comments will be anonymous.

## 2017-07-05 ENCOUNTER — OFFICE VISIT (OUTPATIENT)
Dept: FAMILY MEDICINE | Facility: CLINIC | Age: 72
End: 2017-07-05
Payer: COMMERCIAL

## 2017-07-05 VITALS — DIASTOLIC BLOOD PRESSURE: 68 MMHG | OXYGEN SATURATION: 97 % | HEART RATE: 60 BPM | SYSTOLIC BLOOD PRESSURE: 145 MMHG

## 2017-07-05 DIAGNOSIS — E66.01 MORBID OBESITY, UNSPECIFIED OBESITY TYPE (H): ICD-10-CM

## 2017-07-05 DIAGNOSIS — G89.29 CHRONIC SHOULDER PAIN, UNSPECIFIED LATERALITY: Primary | ICD-10-CM

## 2017-07-05 DIAGNOSIS — I10 BENIGN ESSENTIAL HYPERTENSION: ICD-10-CM

## 2017-07-05 DIAGNOSIS — E11.3299 DIABETES MELLITUS WITH BACKGROUND RETINOPATHY (H): ICD-10-CM

## 2017-07-05 DIAGNOSIS — M25.519 CHRONIC SHOULDER PAIN, UNSPECIFIED LATERALITY: Primary | ICD-10-CM

## 2017-07-05 PROCEDURE — 99350 HOME/RES VST EST HIGH MDM 60: CPT | Performed by: NURSE PRACTITIONER

## 2017-07-05 RX ORDER — INSULIN GLARGINE 100 [IU]/ML
24 INJECTION, SOLUTION SUBCUTANEOUS AT BEDTIME
Qty: 10 ML | Refills: 1 | Status: SHIPPED | OUTPATIENT
Start: 2017-07-05 | End: 2017-09-06 | Stop reason: ALTCHOICE

## 2017-07-05 NOTE — PROGRESS NOTES
SUBJECTIVE:                                                    Gem Jade is a 68 year old female who is seen in her home due to inability to leave the home unassisted, morbid obesity, severe vertigo, generalized weakness, chronic pain, bedbound, rarely able to get up in the , seen today for the following health issues    PHARMD med changes: 6/28/17  Current medications were reviewed today as discussed above.       Medication Adherence: no issues identified     Hypertension: needs improvement. BP is not at goal <140/90 mmHg. Patient would benefit from taking labetalol scheduled - will reduce dose to 150mg BID to hopefully avoid hypotension. Will d/c amlodipine, not using.     Diabetes: stable. A1c at goal <8%. Average SMBG is at goal between  mg/dL. Reviewed use of Novolog sliding scale.       PLAN:         D/c amlodipine.   Take labetalol 150mg BID, hold for systolic BP <120     I spent 60 minutes with this patient today.  All changes were made via collaborative practice agreement with Marivel Barcenas. A copy of the visit note was provided to the patient's primary care provider.      Will follow up in 1 month.     The patient was mailed a summary of these recommendations as an after visit summary.     Yolanda Becerra Pharm D4 Student     Conchita Toledo, PharmD, BCACP         KIDNEY TRANSPLANT       Duration: right kidney replaced, left one non functional     Description (location/character/radiation): chronic managed by kidney transplant team     Intensity:  moderate    Accompanying signs and symptoms: fatigue,   History (similar episodes/previous evaluation):        Precipitating or alleviating factors: DM, bed bound     Therapies tried and outcome: see transplant team notes, last labs done the end of May          Diabetes Follow-up       Patient is checking blood sugars: 1-2x daily    Diabetic concerns: frequent infections and other -  elevated sugars, HgbA1c increased to 7.4 12/16, recent  increased lantus to 24, Novolog 10u 2x daily       Pt currently taking Lantus 24 units daily, Novolog 10 units with meals BID when not testing BS and uses sliding scale 2 units for every 50 over 201. Has been doing better with remembering Novolog.gars:     AM- fasting no readings, random-  105,167,136, 231    Recent increase to 24U of Lantus, taking Novolog 2x daily    Trying to eat 2x daily, still not getting up until noon, naps after dinner, then up until 4am         Symptoms of hypoglycemia (low blood sugar): shaky, weak, lethargy, confusion, Symptoms occur if sugars < below 50.     Paresthesias (numbness or burning in feet) or sores: Yes hands and feet, using gabapentin with good relief     Date of last diabetic eye exam: unsure    Due for HgbA1c                    Amount of exercise or physical activity: None, bed bound, getting up in her wheel chair now several times,     Problems taking medications regularly: No,  set them up and adm them    Medication side effects: none  Diet: diabetic, appetite has improved, still eating a fair amount of simple carbs, following renal diet         Musculoskeletal problem/pain       Duration: Chronic, shoulder has gotten alittle worse      Description  Location: shoulders, left the worse     Intensity:  Moderate,2-7 /10    Accompanying signs and symptoms: numbness and weakness in UE    History  Previous similar problem: YES  Previous evaluation:  x-ray, MRI and orthopedic evaluation  RIGHT SHOULDER THREE VIEWS 5/19/2013 2:23 PM  HISTORY: Rule out fracture.  COMPARISON: 9/14/2012.  FINDINGS: Again seen are degenerative changes of the  acromioclavicular joint. The right shoulder is otherwise unremarkable  and unchanged.    Precipitating or alleviating factors:  Trauma or overuse: YES- uses her arms to turn herself in bed  Aggravating factors include: any use of UE  Therapies tried and outcome: using acetaminophen, using icy hot gel and tramadol rarely, working on core  strength, still using chinese patches and working well              Hypertension Follow-up       Outpatient blood pressures are being checked by [facility], slight increase, unclear if taking meds right, reinforced how to take     No longer taking norvasc, or losartan, labetalol reduced to 150mg 2x daily and holding if systolic under 120    BP readings 142/64, 131/56,152/69, 106/36, 96/33,133/48    Low Salt Diet: not monitoring salt        UTI - Female  Duration of complaint: one more episode of leaking but [facility] inflated the balloon to 30cc and the leaking stopped         Problem list and histories reviewed & adjusted, as indicated.  Additional history: as documented    Recent Labs   Lab Test  06/22/17   1427  05/31/17   1150   04/26/17   0638   04/22/17   0942   04/19/17   0005   03/09/17   1310   12/05/16   1210   08/30/16   0032   06/13/16   1140   12/20/15   0853   02/05/15   1250   05/16/13   1240   A1C   --    --    --    --    --    --    --    --    --    --    --   7.4*   --    --    --   6.7*   --   5.5   < >  6.9*   < >  7.2*   LDL   --    --    --    --    --    --    --    --    --    --    --   64   --    --    --    --    --    --    --   18   --   149*   HDL   --    --    --    --    --    --    --    --    --    --    --   46*   --    --    --    --    --    --    --   43*   --   42*   TRIG   --    --    --    --    --    --    --    --    --    --    --   272*   --    --    --    --    --    --    --   223*   --   262*   ALT   --    --    --   23   --   42   --   44   < >   --    < >   --    < >  6   < >   --    < >   --    < >   --    < >   --    CR  1.14*  1.26*   < >  1.71*   < >  1.16*   < >  1.53*   < >   --    < >   --    < >   --    < >   --    < >  0.74   < >  0.67   < >  0.70   GFRESTIMATED  47*  42*   < >  29*   < >  46*   < >  34*   < >   --    < >   --    < >   --    < >   --    < >  78   < >  87   < >  84   GFRESTBLACK  57*  51*   < >  36*   < >  56*   < >  41*   < >   --    < >   --    < >    --    < >   --    < >  >90   GFR Calc     < >  >90   GFR Calc     < >  >90   POTASSIUM  4.5  4.6   < >  3.8   < >  3.5   < >  4.1   < >   --    < >   --    < >   --    < >   --    < >  3.3*   < >  4.3   < >  3.9   TSH   --    --    --    --    --    --    --    --    --   1.20   --    --    --   1.01   --    --    < >   --    --    --    < >   --     < > = values in this interval not displayed.      BP Readings from Last 3 Encounters:   06/28/17 120/44   06/07/17 130/82   05/24/17 110/60    Wt Readings from Last 3 Encounters:   04/27/17 199 lb 8 oz (90.5 kg)   03/15/17 201 lb (91.2 kg)   02/20/17 205 lb 4.8 oz (93.1 kg)            Labs reviewed in EPIC    Reviewed and updated as needed this visit by clinical staff       Reviewed and updated as needed this visit by Provider         ROS:  Constitutional, HEENT, cardiovascular, pulmonary, gi and gu systems are negative, except as otherwise noted.    OBJECTIVE:                                                    /68 (BP Location: Other (Comment), Patient Position: Supine)  Pulse 60  SpO2 97%  There is no height or weight on file to calculate BMI.  GENERAL: alert, mild distress, obese, elderly and laying on her back in her bed, engaged in the conversation   RESP: lungs clear to auscultation - no rales, rhonchi or wheezes  CV: regular rate and rhythm, normal S1 S2, no S3 or S4, no murmur, click or rub, no peripheral edema and peripheral pulses strong  ABDOMEN: soft, nontender, no masses and bowel sounds normal  MS: no gross musculoskeletal defects noted, no edema  SKIN: no suspicious lesions or rashes  PSYCH: mentation appears normal, affect normal/bright    Diagnostic Test Results:  Results for orders placed or performed in visit on 06/22/17   Basic metabolic panel  (Ca, Cl, CO2, Creat, Gluc, K, Na, BUN)   Result Value Ref Range    Sodium 141 133 - 144 mmol/L    Potassium 4.5 3.4 - 5.3 mmol/L    Chloride 105 94 - 109 mmol/L     Carbon Dioxide 27 20 - 32 mmol/L    Anion Gap 9 3 - 14 mmol/L    Glucose 69 (L) 70 - 99 mg/dL    Urea Nitrogen 27 7 - 30 mg/dL    Creatinine 1.14 (H) 0.52 - 1.04 mg/dL    GFR Estimate 47 (L) >60 mL/min/1.7m2    GFR Estimate If Black 57 (L) >60 mL/min/1.7m2    Calcium 7.9 (L) 8.5 - 10.1 mg/dL     *Note: Due to a large number of results and/or encounters for the requested time period, some results have not been displayed. A complete set of results can be found in Results Review.        ASSESSMENT/PLAN:                                                    ASSESSMENT / PLAN:  (M25.519,  G89.29) Chronic shoulder pain, unspecified laterality  (primary encounter diagnosis)  Comment: slight increase  Plan: discussed rest and continue current meds    (E11.319) Diabetes mellitus with background retinopathy  Comment: unable to afford pens right now, will switch to vials   Plan: insulin aspart (NOVOLOG VIAL) 100 UNITS/ML         injection, Hemoglobin A1c, insulin glargine         (LANTUS VIAL) 100 UNIT/ML injection, insulin         syringes, disposable, U-100 0.5 ML MISC        Vials ordered, discussed diet and continuing to get up in her WC    (E66.01) Morbid obesity, unspecified obesity type (H)  Comment: slow weight loss  Plan: encourage getting up in her WC    (I10) Benign essential hypertension  Comment: slight increase, still holding labetalol frequently   Plan: discussed appropriate med use and consistent use     Patient Instructions   Please continue your current meds and take your labetalol regularly and hold if systolic is below 120    Continue to get up in your WC as often as you can     Please remember to eat small freq meals     I will see you in one month  8/11/17      Marivel Barcenas, ALISON, APRN [facility]    Visit time 60   min with > than 50% of the time spent in counseling on: care coordination, DM, kidney transplant, shoulder pain, obesity, recurrent UTI

## 2017-07-05 NOTE — MR AVS SNAPSHOT
After Visit Summary   7/5/2017    Gem Jade    MRN: [id number]           Patient Information     Date Of Birth          1945        Visit Information        Provider Department      7/5/2017 2:00 PM Marivel Barcenas APRN CNP [facility]        Today's Diagnoses     Chronic shoulder pain, unspecified laterality    -  1    Diabetes mellitus with background retinopathy        Morbid obesity, unspecified obesity type (H)        Benign essential hypertension          Care Instructions    Please continue your current meds and take your labetalol regularly and hold if systolic is below 120    Continue to get up in your WC as often as you can     Please remember to eat small freq meals     I will see you in one month            Follow-ups after your visit        Follow-up notes from your care team     Return in about 4 weeks (around 8/2/2017).      Your next 10 appointments already scheduled     Jul 26, 2017  2:00 PM CDT   Office Visit with Conchita Toledo [facility] ([facility])    [address] [place] [id number] [phone]           Bring a current list of meds and any records pertaining to this visit.  For Physicals, please bring immunization records and any forms needing to be filled out.  Please arrive 10 minutes early to complete paperwork.            Aug 11, 2017  2:00 PM CDT   Return Visit with ANTHONY Antonio CNP   [facility]    [address] [place] [address]  [address] [place] [address]   [phone]            Sep 06, 2017  2:00 PM CDT   Return Visit with ANTHONY Antonio CNP   [facility] ([facility]    [address] [place] [address]  [address] [place] [address]   [phone]            Oct 04, 2017  2:00 PM CDT   Return Visit with Marivel Barcenas  APRN CNP   [facility]    [address]  [address]  [facility] [address] [phone]              Who to contact     If you have questions or need follow up information about today's clinic visit or your schedule please contact [facility] directly at [phone].  Normal or non-critical lab and imaging results will be communicated to you by MyChart, letter or phone within 4 business days after the clinic has received the results. If you do not hear from us within 7 days, please contact the clinic through [employer] or phone. If you have a critical or abnormal lab result, we will notify you by phone as soon as possible.  Submit refill requests through [employer] or call your pharmacy and they will forward the refill request to us. Please allow 3 business days for your refill to be completed.          Additional Information About Your Visit        M[employer] Information     [employer] gives you secure access to your electronic health record. If you see a primary care provider, you can also send messages to your care team and make appointments. If you have questions, please call your primary care clinic.  If you do not have a primary care provider, please call [phone] and they will assist you.        Care EveryWhere ID     This is your Care EveryWhere ID. This could be used by other organizations to access your [facility] medical records  [id number]        Your Vitals Were     Pulse Pulse Oximetry                60 97%           Blood Pressure from Last 3 Encounters:   07/05/17 145/68   06/28/17 120/44   06/07/17 130/82    Weight from Last 3 Encounters:   04/27/17 199 lb 8 oz (90.5 kg)   03/15/17 201 lb (91.2 kg)   02/20/17 205 lb 4.8 oz (93.1 kg)                 Today's Medication Changes          These changes are accurate as of: 7/5/17 11:59 PM.  If you have any questions, ask your nurse or doctor.               Start taking these medicines.         Dose/Directions    insulin aspart 100 UNITS/ML injection   Commonly known as:  NovoLOG VIAL   Used for:  Diabetes mellitus with background retinopathy (H)   Replaces:  NovoLOG FLEXPEN 100 UNIT/ML injection        10u with lunch and  dinner. At bedtime use following sliding scale: 150-200 5u 201-250 7u 251-300 9u 301-350 11u 351+ 13u   Quantity:  10 mL   Refills:  1       insulin syringes (disposable) U-100 0.5 ML Misc   Used for:  Diabetes mellitus with background retinopathy (H)        Dose:  10 Units   10 Units 3 times daily   Quantity:  3 each   Refills:  3         These medicines have changed or have updated prescriptions.        Dose/Directions    insulin glargine 100 UNIT/ML injection   Commonly known as:  LANTUS VIAL   This may have changed:  when to take this   Used for:  Diabetes mellitus with background retinopathy (H)        Dose:  24 Units   Inject 24 Units Subcutaneous At Bedtime   Quantity:  10 mL   Refills:  1       TraMADol HCl 50 MG Tbdp   Indication:  Moderate to Moderately Severe Pain   This may have changed:  when to take this   Used for:  Chronic shoulder pain, unspecified laterality        Dose:  50 mg   Take 50 mg by mouth 3 times daily as needed   Quantity:  90 tablet   Refills:  3         Stop taking these medicines if you haven't already. Please contact your care team if you have questions.     NovoLOG FLEXPEN 100 UNIT/ML injection   Generic drug:  insulin aspart   Replaced by:  insulin aspart 100 UNITS/ML injection                Where to get your medicines      These medications were sent to [facility] [address] [place] [address] [id number] [place] [address]  [address] [facility] [address]     Phone:  [phone]     insulin aspart 100 UNITS/ML injection    insulin glargine 100 UNIT/ML injection    insulin syringes (disposable) U-100 0.5 ML [facility]                Primary Care Provider Office Phone # Fax #    ANTHONY Antonio [phone]       [facility] [address]  [facility] [address]        Equal Access to Services     OMAYRAKYLAH PETER : Hadii aad ku hadchristinajavier Sojorgeali, waaxda luqadaha, qaybta kanavidda annibettinajorje, jessica carrion emilianareggie patelhugocarlitos roman. So [facility] [phone].    ATENCIÓN: Si habla español, tiene a peguero disposición servicios gratuitos de asistencia lingüística. Llame al [phone].    We comply with applicable federal civil rights laws and [place] laws. We do not discriminate on the basis of race, color, national origin, age, disability sex, sexual orientation or gender identity.            Thank you!     Thank you for choosing [facility]  for your care. Our goal is always to provide you with excellent care. Hearing back from our patients is one way we can continue to improve our services. Please take a few minutes to complete the written survey that you may receive in the mail after your visit with us. Thank you!             Your Updated Medication List - Protect others around you: Learn how to safely use, store and throw away your medicines at www.disposemymeds.org.          This list is accurate as of: 7/5/17 11:59 PM.  Always use your most recent med list.                   Brand Name Dispense Instructions for use Diagnosis    ACCU-CHEK COMPACT PLUS test strip   Generic drug:  blood glucose monitoring     100 strip    Use to test blood sugar 3 times daily or as directed.  Ok to substitute alternative if insurance prefers.        ACETAMINOPHEN PO      Take 325-650 mg by mouth every 4 hours as needed.        alcohol swab prep pads     1 Box    Use to swab area of injection/yvon and vials as directed.    Type 2 diabetes, HbA1C goal < 8% (H)       aspirin 81 MG chewable tablet     90 tablet    Take 1 tablet (81 mg) by mouth daily Starting 1 week after your kidney biopsy    Pulmonary embolism and infarction (H), Diabetes mellitus with background retinopathy (H)       cinacalcet 30 MG tablet     SENSIPAR    60 tablet    Take 1 tablet (30 mg) by mouth daily    Kidney replaced by transplant, Hypercalcemia       clotrimazole 1 % cream    LOTRIMIN    40 g    Apply topically 2 times daily Apply to buttocks twice a day until rash resolved.        Cranberry 400 MG Tabs      Take 400 mg by mouth 2 times daily        cyanocobalamin 1000 MCG/ML injection    VITAMIN B12    3 mL    Inject 1 mL (1,000 mcg) into the muscle every 30 days    Iron deficiency anemia, unspecified iron deficiency anemia type       diphenoxylate-atropine 2.5-0.025 MG per tablet    LOMOTIL    50 tablet    Take 1 tablet by mouth 4 times daily as needed for diarrhea    Diarrhea of presumed infectious origin       famotidine 20 MG tablet    PEPCID    60 tablet    Take 1 tablet (20 mg) by mouth 2 times daily        gabapentin 300 MG capsule    NEURONTIN    180 capsule    Take 3 capsules (900 mg) by mouth 2 times daily For pain    Diabetic polyneuropathy associated with type 2 diabetes mellitus (H)       GLUCAGON EMERGENCY 1 MG kit   Generic drug:  glucagon     1 each    Inject 1 mg into the muscle once    Hypoglycemia       insulin aspart 100 UNITS/ML injection    NovoLOG VIAL    10 mL    10u with lunch and  dinner. At bedtime use following sliding scale: 150-200 5u 201-250 7u 251-300 9u 301-350 11u 351+ 13u    Diabetes mellitus with background retinopathy (H)       insulin glargine 100 UNIT/ML injection    LANTUS VIAL    10 mL    Inject 24 Units Subcutaneous At Bedtime    Diabetes mellitus with background retinopathy (H)       insulin pen needle 30G X 8 MM     100 each    Use as directed with Lantus    Type 2 diabetes mellitus with diabetic nephropathy (H)       insulin syringes (disposable) U-100 0.5 ML Misc     3 each    10 Units 3 times daily    Diabetes mellitus with background retinopathy (H)       labetalol 300 MG tablet    NORMODYNE    120 tablet    Take 1/2 tablet (150mg) BID. Hold for systolic BP less than 120.    Benign essential  "hypertension, Kidney replaced by transplant       lactobacillus rhamnosus (GG) capsule     60 capsule    Take 1 capsule by mouth 2 times daily    Diarrhea       levothyroxine 50 MCG tablet    SYNTHROID    90 tablet    Take 1 tablet (50 mcg) by mouth daily    Other specified hypothyroidism       loratadine 10 MG tablet    CLARITIN    90 tablet    Take 1 tablet (10 mg) by mouth daily    Acute nasopharyngitis       meclizine 12.5 MG tablet    ANTIVERT    90 tablet    Take 1 tablet (12.5 mg) by mouth 3 times daily as needed for dizziness    Disorientation       NYSTOP 222932 UNIT/GM Powd   Generic drug:  nystatin     60 g    Externally apply 1 g topically 3 times daily as needed    Fungal infection       ondansetron 4 MG ODT tab    ZOFRAN ODT    20 tablet    Take 1-2 tablets (4-8 mg) by mouth every 8 hours as needed for nausea    Nausea       order for DME     3 catheter    Equipment being ordered: Johnson Catheters - 18 french    Johnson catheter status       * order for DME     100 pad    Equipment being ordered: Blue Chux Please send to [facility]    Fecal incontinence       * order for DME     1 each    Equipment being ordered: [employer] CPAP interface (nasal pillows), size XS.    Obstructive sleep apnea syndrome       * order for DME     12 each    Equipment being ordered: Silver Impregnated catheters HX of frequent UTI    Recurrent UTI       pravastatin 10 MG tablet    PRAVACHOL    90 tablet    TAKE ONE TABLET BY MOUTH EVERY DAY    Hyperlipidemia LDL goal <100       predniSONE 5 MG tablet    DELTASONE    90 tablet    Take 1 tablet (5 mg) by mouth daily    Kidney replaced by transplant       simethicone 125 MG Chew chewable tablet    MYLICON    120 tablet    Take 1 tablet (125 mg) by mouth as needed for intestinal gas        syringe/needle (disp) 22G X 1-1/2\" 3 ML Misc    B-D SYRINGE/NEEDLE    50 each    Inject 1 Syringe into the muscle every 30 days    Iron deficiency anemia, unspecified iron deficiency anemia type "       * tacrolimus 0.5 MG capsule    PROGRAF - GENERIC EQUIVALENT    180 capsule    Take 1 capsule (0.5 mg) by mouth 2 times daily    Kidney transplant recipient       * tacrolimus 1 MG capsule    PROGRAF - GENERIC EQUIVALENT    180 capsule    Take 1 capsule (1 mg) by mouth 2 times daily    Kidney replaced by transplant       TraMADol HCl 50 MG Tbdp     90 tablet    Take 50 mg by mouth 3 times daily as needed    Chronic shoulder pain, unspecified laterality       TRILEPTAL 300 MG tablet   Generic drug:  OXcarbazepine     180 tablet    Take 1 tablet (300 mg) by mouth 2 times daily For pain    Diabetic polyneuropathy associated with diabetes mellitus due to underlying condition (H)       * vitamin D 2000 UNITS tablet     100 tablet    Take 2,000 Units by mouth 2 times daily    Vitamin deficiency       * Cholecalciferol 5000 UNITS Tabs    ELMER-RUL VITAMIN D    90 tablet    Take 5,000 IU daily    Vitamin deficiency       * Notice:  This list has 7 medication(s) that are the same as other medications prescribed for you. Read the directions carefully, and ask your doctor or other care provider to review them with you.

## 2017-07-08 NOTE — PATIENT INSTRUCTIONS
Please continue your current meds and take your labetalol regularly and hold if systolic is below 120    Continue to get up in your WC as often as you can     Please remember to eat small freq meals     I will see you in one month

## 2017-07-10 ENCOUNTER — TELEPHONE (OUTPATIENT)
Dept: FAMILY MEDICINE | Facility: CLINIC | Age: 72
End: 2017-07-10

## 2017-07-10 NOTE — TELEPHONE ENCOUNTER
Spoke with SEKOU Bateman [facility] who was with patient during call.  She stated temp currently 96.8 (was 100.5 this am with highest over the weekend to be 100.8).  Blood sugar 170 this morning, /54,  HR 60.  Catheter has been changed end of June and again last week.  Urine is clear, no sediment and no odor.    Patient also has a small open area on her buttock measuring 1.0 cm x 0.3 cm with redness around measuring 8 cm x 9 cm.  There is no drainage from the wound.    Patient also stated possible infection with her teeth.  There are no open areas in her mouth.  She did have pain in her mouth over the weekend but currently is having no pain.      Bhavana stated she will check the balloon on the catheter.  Patient has phenazopyrizine hcl 95 mg for bladder spasms.  She took one dose during Bhavana's visit to see if that will help.    Routing to provider to review.    Wendie Harris RN

## 2017-07-10 NOTE — TELEPHONE ENCOUNTER
Spoke with provider regarding patient.  She wanted to make sure labs were scheduled as she should have her 2 transplant labs, BMP and CBC with diff.      Provider also stated patient could try clove oil for her sore mouth.  That could be ordered from her pharmacy but typically is not covered by insurance.  As for the sore on her buttock, she gave instructions to use barrier cream.    Spoke with Keshawn, spouse and given information regarding monitor urine, clove oil for mouth with approximate cost and directions for use and use of barrier cream to buttock.  He was rather sleepy so instructed he could speak with patient and return contact once decision has been made on clove oil.    Spoke with Mary at [facility] who stated patient is scheduled for lab draw on 7/13/17 and 8/10/17 with Munir.  Labs included were noted as listed above.    Sending message to Bhavana with information as noted above.    Wendie Harris RN

## 2017-07-10 NOTE — TELEPHONE ENCOUNTER
Sent message to SEKOU Bateman [facility] requesting for her to make a visit with patient today and assess patient's status.      Wendie Harris RN

## 2017-07-10 NOTE — TELEPHONE ENCOUNTER
Reason for call:  Home Healthcare Reason for Call:  Home Health Care    Bhavana RN with  Homecare called regarding (reason for call): pt has had a fever all weekend    Bhavana states that the pt's  called her to report that the pt has had a fever of 100.8 all weekend.  did give the pt Tylenol, and her temp did go down to 97.7 at one point, but has consistently stayed around 100.5-100.8. Pt's urine is clear, no dysuria. Pain is at baseline for pt. Pt does not have any nausea or vomiting. Pt's  is reporting no other issues.     Phone Number Homecare Nurse can be reached at: [phone]    Can we leave a detailed message on this number? YES    Call taken on 7/10/2017 at 8:23 AM by Davina Aguirre

## 2017-07-11 ENCOUNTER — TELEPHONE (OUTPATIENT)
Dept: FAMILY MEDICINE | Facility: CLINIC | Age: 72
End: 2017-07-11

## 2017-07-11 DIAGNOSIS — N39.0 RECURRENT UTI: Primary | ICD-10-CM

## 2017-07-11 LAB
ALBUMIN UR-MCNC: 100 MG/DL
APPEARANCE UR: ABNORMAL
BACTERIA #/AREA URNS HPF: ABNORMAL /HPF
BILIRUB UR QL STRIP: NEGATIVE
COLOR UR AUTO: YELLOW
GLUCOSE UR STRIP-MCNC: NEGATIVE MG/DL
HGB UR QL STRIP: ABNORMAL
KETONES UR STRIP-MCNC: NEGATIVE MG/DL
LEUKOCYTE ESTERASE UR QL STRIP: ABNORMAL
NITRATE UR QL: POSITIVE
PH UR STRIP: 6.5 PH (ref 5–7)
RBC #/AREA URNS AUTO: 5 /HPF (ref 0–2)
SP GR UR STRIP: 1.01 (ref 1–1.03)
URN SPEC COLLECT METH UR: ABNORMAL
UROBILINOGEN UR STRIP-MCNC: NORMAL MG/DL (ref 0–2)
WBC #/AREA URNS AUTO: >182 /HPF (ref 0–2)
WBC CLUMPS #/AREA URNS HPF: PRESENT /HPF

## 2017-07-11 PROCEDURE — 87088 URINE BACTERIA CULTURE: CPT

## 2017-07-11 PROCEDURE — 81001 URINALYSIS AUTO W/SCOPE: CPT

## 2017-07-11 PROCEDURE — 87086 URINE CULTURE/COLONY COUNT: CPT

## 2017-07-11 PROCEDURE — 87186 SC STD MICRODIL/AGAR DIL: CPT

## 2017-07-11 NOTE — TELEPHONE ENCOUNTER
Thao from [facility] called in regards to the patient. Stated that she has very cloudy urine and is having some issues and they think it could be a UTI.  Was not informed of any other symptoms. Would like a call back from RN.     Alicia to RN

## 2017-07-11 NOTE — TELEPHONE ENCOUNTER
Spoke with PCP. Will order UA/UC, CBC with Diff, and Procalcitonin.     Email was sent to client services to draw labs. SEKOU Osuna with drop off the urine that was collected.

## 2017-07-13 DIAGNOSIS — Z94.0 KIDNEY TRANSPLANT RECIPIENT: ICD-10-CM

## 2017-07-13 DIAGNOSIS — E11.3299 DIABETES MELLITUS WITH BACKGROUND RETINOPATHY (H): ICD-10-CM

## 2017-07-13 DIAGNOSIS — N39.0 URINARY TRACT INFECTION: ICD-10-CM

## 2017-07-13 DIAGNOSIS — Z79.60 LONG-TERM USE OF IMMUNOSUPPRESSANT MEDICATION: ICD-10-CM

## 2017-07-13 DIAGNOSIS — D84.9 IMMUNOSUPPRESSED STATUS (H): ICD-10-CM

## 2017-07-13 LAB
BACTERIA SPEC CULT: ABNORMAL
ERYTHROCYTE [DISTWIDTH] IN BLOOD BY AUTOMATED COUNT: 13.9 % (ref 10–15)
HBA1C MFR BLD: 5.6 % (ref 4.3–6)
HCT VFR BLD AUTO: 33.4 % (ref 35–47)
HGB BLD-MCNC: 10.8 G/DL (ref 11.7–15.7)
Lab: ABNORMAL
MCH RBC QN AUTO: 30.8 PG (ref 26.5–33)
MCHC RBC AUTO-ENTMCNC: 32.3 G/DL (ref 31.5–36.5)
MCV RBC AUTO: 95 FL (ref 78–100)
MICRO REPORT STATUS: ABNORMAL
MICROORGANISM SPEC CULT: ABNORMAL
MICROORGANISM SPEC CULT: ABNORMAL
PLATELET # BLD AUTO: 156 10E9/L (ref 150–450)
PROCALCITONIN SERPL-MCNC: 0.19 NG/ML
RBC # BLD AUTO: 3.51 10E12/L (ref 3.8–5.2)
SPECIMEN SOURCE: ABNORMAL
WBC # BLD AUTO: 5.6 10E9/L (ref 4–11)

## 2017-07-13 PROCEDURE — 36415 COLL VENOUS BLD VENIPUNCTURE: CPT | Performed by: PREVENTIVE MEDICINE

## 2017-07-13 PROCEDURE — 83036 HEMOGLOBIN GLYCOSYLATED A1C: CPT | Performed by: PREVENTIVE MEDICINE

## 2017-07-13 PROCEDURE — 80197 ASSAY OF TACROLIMUS: CPT | Performed by: PREVENTIVE MEDICINE

## 2017-07-13 PROCEDURE — 80195 ASSAY OF SIROLIMUS: CPT | Performed by: PREVENTIVE MEDICINE

## 2017-07-13 PROCEDURE — 84145 PROCALCITONIN (PCT): CPT | Performed by: PREVENTIVE MEDICINE

## 2017-07-13 PROCEDURE — 80048 BASIC METABOLIC PNL TOTAL CA: CPT | Performed by: PREVENTIVE MEDICINE

## 2017-07-13 PROCEDURE — 85027 COMPLETE CBC AUTOMATED: CPT | Performed by: PREVENTIVE MEDICINE

## 2017-07-14 ENCOUNTER — TELEPHONE (OUTPATIENT)
Dept: FAMILY MEDICINE | Facility: CLINIC | Age: 72
End: 2017-07-14

## 2017-07-14 LAB
SIROLIMUS BLD-MCNC: ABNORMAL UG/L (ref 5–15)
TACROLIMUS BLD-MCNC: 3.7 UG/L (ref 5–15)
TME LAST DOSE: ABNORMAL H
TME LAST DOSE: ABNORMAL H

## 2017-07-14 NOTE — TELEPHONE ENCOUNTER
Klaudia would like RN to call patient on Mon, 7/17, to see how she is doing.    Pt was asymptomatic except slight issues with word-finding on 7/14. Klaudia did not order antibiotic and would like to know if she develops symptoms.    I reminded pt to call 24/7 if she starts running a fever again or develops other UTI symptoms. She voiced understanding.    Madeline Flores RN

## 2017-07-15 LAB
ANION GAP SERPL CALCULATED.3IONS-SCNC: 9 MMOL/L (ref 3–14)
BUN SERPL-MCNC: 38 MG/DL (ref 7–30)
CALCIUM SERPL-MCNC: 7.7 MG/DL (ref 8.5–10.1)
CHLORIDE SERPL-SCNC: 96 MMOL/L (ref 94–109)
CO2 SERPL-SCNC: 28 MMOL/L (ref 20–32)
CREAT SERPL-MCNC: 1.23 MG/DL (ref 0.52–1.04)
GFR SERPL CREATININE-BSD FRML MDRD: 43 ML/MIN/1.7M2
GLUCOSE SERPL-MCNC: 152 MG/DL (ref 70–99)
POTASSIUM SERPL-SCNC: 4.3 MMOL/L (ref 3.4–5.3)
SODIUM SERPL-SCNC: 133 MMOL/L (ref 133–144)

## 2017-07-17 NOTE — TELEPHONE ENCOUNTER
Spoke with spouse who states patient is doing well right now.  No s/s of UTI at this time.    No further action required so closing encounter.    Wendie Harris RN

## 2017-07-19 ENCOUNTER — TELEPHONE (OUTPATIENT)
Dept: TRANSPLANT | Facility: CLINIC | Age: 72
End: 2017-07-19

## 2017-07-19 NOTE — TELEPHONE ENCOUNTER
Prior Authorization Specialty Medication Request    Medication/Dose: Tacrolimus 1 mg, Total dose = 1.5 mg BID  Diagnosis and ICD: Kidney Transplant Z94.0  New/Renewal/Insurance Change PA: renewal    Important Lab Values:     Previously Tried and Failed Therapies:     Rationale:     Would you like to include any research articles?    If yes please include the hyperlink(s) below or fax @ [phone].    (Include Name and MRN)    If you received a fax notification from an outside Pharmacy;  Pharmacy Name:[facility]  Pharmacy #:  Pharmacy Fax:

## 2017-07-19 NOTE — TELEPHONE ENCOUNTER
Prior Authorization Specialty Medication Request    Medication/Dose: Tacrolimus 0.5 mg, Total dose = 1.5 mg BID  Diagnosis and ICD: Kidney Transplant Z94.0  New/Renewal/Insurance Change PA: renewal    Important Lab Values:     Previously Tried and Failed Therapies:     Rationale:     Would you like to include any research articles?    If yes please include the hyperlink(s) below or fax @ [phone].    (Include Name and MRN)    If you received a fax notification from an outside Pharmacy;  Pharmacy Name:[facility]   Pharmacy #:  Pharmacy Fax:

## 2017-07-20 NOTE — TELEPHONE ENCOUNTER
Tacrolimus 1mg is too soon to be filled until 8/14/17. Prior authorization is not required. Patient fills through [facility].

## 2017-07-20 NOTE — TELEPHONE ENCOUNTER
Prior authorization is not required for tacrolimus. [facility] just sent patient 90 day supply on 7/17/17.

## 2017-07-26 ENCOUNTER — OFFICE VISIT (OUTPATIENT)
Dept: PHARMACY | Facility: CLINIC | Age: 72
End: 2017-07-26
Payer: COMMERCIAL

## 2017-07-26 VITALS — OXYGEN SATURATION: 94 % | HEART RATE: 60 BPM | DIASTOLIC BLOOD PRESSURE: 78 MMHG | SYSTOLIC BLOOD PRESSURE: 110 MMHG

## 2017-07-26 DIAGNOSIS — Z97.8 FOLEY CATHETER IN PLACE: ICD-10-CM

## 2017-07-26 DIAGNOSIS — Z94.0 KIDNEY REPLACED BY TRANSPLANT: Primary | ICD-10-CM

## 2017-07-26 DIAGNOSIS — I10 BENIGN ESSENTIAL HYPERTENSION: ICD-10-CM

## 2017-07-26 PROCEDURE — 99607 MTMS BY PHARM ADDL 15 MIN: CPT | Performed by: PHARMACIST

## 2017-07-26 PROCEDURE — 99606 MTMS BY PHARM EST 15 MIN: CPT | Performed by: PHARMACIST

## 2017-07-26 RX ORDER — CLONIDINE HYDROCHLORIDE 0.1 MG/1
0.1 TABLET ORAL 2 TIMES DAILY
Qty: 60 TABLET | Refills: 1 | COMMUNITY
Start: 2017-07-26 | End: 2017-11-01

## 2017-07-26 NOTE — MR AVS SNAPSHOT
After Visit Summary   7/26/2017    Gem Jade    MRN: [id number]           Patient Information     Date Of Birth          1945        Visit Information        Provider Department      7/26/2017 2:00 PM Conchita Toledo, [facility] MTM        Today's Diagnoses     Kidney replaced by transplant    -  1    Johnson catheter in place        Benign essential hypertension          Care Instructions    Recommendations from today's MTM visit:                                                        1. Refilled prednisone at [facility] for 1 month supply.  Checked with [facility] pharmacy and states there are no problems with refilling. When you need a refill, please call [facility].     2. Ordered lidocaine 2% gel for use with catheter changes.    Next MTM visit: 1 month with Klaudia    To schedule another MTM appointment, please call the clinic directly or you may call the MTM scheduling line at [phone] or toll-free at [phone].     My Clinical Pharmacist's contact information:                                                      It was a pleasure seeing you today!  Please feel free to contact me with any questions or concerns you have.      Conchita Toledo, PharmD, BCACP     You may receive a survey about the MTM services you received.  I would appreciate your feedback to help me serve you better in the future. Please fill it out and return it when you can. Your comments will be anonymous.                  Follow-ups after your visit        Your next 10 appointments already scheduled     Aug 18, 2017  2:00 PM CDT   Return Visit with ANTHONY Antonio CNP   [facility] ([facility])    [address] [place] [address]  [address]  [facility] [address]   [phone]            Sep 06, 2017  2:00 PM CDT   Return Visit with ANTHONY nAtonio CNP   [facility] ([facility])    [address] [facility]  [address] [place] [id number] [phone]            Sep 06, 2017  2:00 PM CDT   Office Visit with Conchita Toledo RPH   [facility] ([facility])    [address] [place] [address] [phone]           Bring a current list of meds and any records pertaining to this visit. For Physicals, please bring immunization records and any forms needing to be filled out. Please arrive 10 minutes early to complete paperwork.            Oct 04, 2017  2:00 PM CDT   Return Visit with ANTHONY Antonio CNP   [facility] ([facility])    [address] [place] [address] [phone]              Who to contact     If you have questions or need follow up information about today's clinic visit or your schedule please contact [facility] directly at [phone].  Normal or non-critical lab and imaging results will be communicated to you by Play[employer], letter or phone within 4 business days after the clinic has received the results. If you do not hear from us within 7 days, please contact the clinic through [employer] or phone. If you have a critical or abnormal lab result, we will notify you by phone as soon as possible.  Submit refill requests through [employer] or call your pharmacy and they will forward the refill request to us. Please allow 3 business days for your refill to be completed.          Additional Information About Your Visit        PlayMotion[employer] Information     [employer] gives you secure access to your electronic health record. If you see a primary care provider, you can also send messages to your care team and make appointments. If you have questions, please call your primary care clinic.  If you do not have a primary care provider, please call [phone] and they will assist you.        Care EveryWhere ID     This is  your Care EveryWhere ID. This could be used by other organizations to access your [place] medical records  [id number]        Your Vitals Were     Pulse Pulse Oximetry                60 94%           Blood Pressure from Last 3 Encounters:   07/26/17 110/78   07/05/17 145/68   06/28/17 120/44    Weight from Last 3 Encounters:   04/27/17 199 lb 8 oz (90.5 kg)   03/15/17 201 lb (91.2 kg)   02/20/17 205 lb 4.8 oz (93.1 kg)              Today, you had the following     No orders found for display         Today's Medication Changes          These changes are accurate as of: 7/26/17 11:59 PM.  If you have any questions, ask your nurse or doctor.               Start taking these medicines.        Dose/Directions    lidocaine 2 % topical gel   Commonly known as:  XYLOCAINE   Used for:  Johnson catheter in place        Apply topically as needed for moderate pain 10 ML every 3 weeks   Quantity:  30 mL   Refills:  11         These medicines have changed or have updated prescriptions.        Dose/Directions    TraMADol HCl 50 MG Tbdp   Indication:  Moderate to Moderately Severe Pain   This may have changed:  when to take this   Used for:  Chronic shoulder pain, unspecified laterality        Dose:  50 mg   Take 50 mg by mouth 3 times daily as needed   Quantity:  90 tablet   Refills:  3            Where to get your medicines      These medications were sent to [facility] - [place], [place] - [address]  [address] [facility] [address]    Hours:  Mon-Fri 8:30am-5:00pm Toll Free [phone] Phone:  [phone]     lidocaine 2 % topical gel                Primary Care Provider Office Phone # Fax #    ANTHONY Antonio [phone]        [facility] [address]  [facility] [address]        Equal Access to Services     CARLOS ENRIQUE GREEN AH: Marques Goodman, madhav dai, jessica paz  jorgehugocarlitos mcnairBradjessie ah. So [facility] [phone].    ATENCIÓN: Si lacy alston, tiene a peguero disposición servicios gratuitos de asistencia lingüística. Carmine gambino [phone].    We comply with applicable federal civil rights laws and [place] laws. We do not discriminate on the basis of race, color, national origin, age, disability sex, sexual orientation or gender identity.            Thank you!     Thank you for choosing [facility]  for your care. Our goal is always to provide you with excellent care. Hearing back from our patients is one way we can continue to improve our services. Please take a few minutes to complete the written survey that you may receive in the mail after your visit with us. Thank you!             Your Updated Medication List - Protect others around you: Learn how to safely use, store and throw away your medicines at www.disposemymeds.org.          This list is accurate as of: 7/26/17 11:59 PM.  Always use your most recent med list.                   Brand Name Dispense Instructions for use Diagnosis    ACCU-CHEK COMPACT PLUS test strip   Generic drug:  blood glucose monitoring     100 strip    Use to test blood sugar 3 times daily or as directed.  Ok to substitute alternative if insurance prefers.        ACETAMINOPHEN PO      Take 325-650 mg by mouth every 4 hours as needed.        alcohol swab prep pads     1 Box    Use to swab area of injection/yvon and vials as directed.    Type 2 diabetes, HbA1C goal < 8% (H)       aspirin 81 MG chewable tablet     90 tablet    Take 1 tablet (81 mg) by mouth daily Starting 1 week after your kidney biopsy    Pulmonary embolism and infarction (H), Diabetes mellitus with background retinopathy (H)       cinacalcet 30 MG tablet    SENSIPAR    60 tablet    Take 1 tablet (30 mg) by mouth daily    Kidney replaced by transplant, Hypercalcemia       cloNIDine 0.1 MG tablet    CATAPRES    60 tablet    Take 1 tablet (0.1 mg) by mouth 2  times daily    Johnson catheter in place       clotrimazole 1 % cream    LOTRIMIN    40 g    Apply topically 2 times daily Apply to buttocks twice a day until rash resolved.        Cranberry 400 MG Tabs      Take 400 mg by mouth 2 times daily        cyanocobalamin 1000 MCG/ML injection    VITAMIN B12    3 mL    Inject 1 mL (1,000 mcg) into the muscle every 30 days    Iron deficiency anemia, unspecified iron deficiency anemia type       diphenoxylate-atropine 2.5-0.025 MG per tablet    LOMOTIL    50 tablet    Take 1 tablet by mouth 4 times daily as needed for diarrhea    Diarrhea of presumed infectious origin       famotidine 20 MG tablet    PEPCID    60 tablet    Take 1 tablet (20 mg) by mouth 2 times daily        gabapentin 300 MG capsule    NEURONTIN    180 capsule    Take 3 capsules (900 mg) by mouth 2 times daily For pain    Diabetic polyneuropathy associated with type 2 diabetes mellitus (H)       GLUCAGON EMERGENCY 1 MG kit   Generic drug:  glucagon     1 each    Inject 1 mg into the muscle once    Hypoglycemia       insulin aspart 100 UNITS/ML injection    NovoLOG VIAL    10 mL    10u with lunch and  dinner. At bedtime use following sliding scale: 150-200 5u 201-250 7u 251-300 9u 301-350 11u 351+ 13u    Diabetes mellitus with background retinopathy (H)       insulin glargine 100 UNIT/ML injection    LANTUS VIAL    10 mL    Inject 24 Units Subcutaneous At Bedtime    Diabetes mellitus with background retinopathy (H)       insulin pen needle 30G X 8 MM     100 each    Use as directed with Lantus    Type 2 diabetes mellitus with diabetic nephropathy (H)       insulin syringes (disposable) U-100 0.5 ML Misc     3 each    10 Units 3 times daily    Diabetes mellitus with background retinopathy (H)       labetalol 300 MG tablet    NORMODYNE    120 tablet    Take 1/2 tablet (150mg) BID. Hold for systolic BP less than 120.    Benign essential hypertension, Kidney replaced by transplant       lactobacillus rhamnosus (GG)  "capsule     60 capsule    Take 1 capsule by mouth 2 times daily    Diarrhea       levothyroxine 50 MCG tablet    SYNTHROID    90 tablet    Take 1 tablet (50 mcg) by mouth daily    Other specified hypothyroidism       lidocaine 2 % topical gel    XYLOCAINE    30 mL    Apply topically as needed for moderate pain 10 ML every 3 weeks    Johnson catheter in place       loratadine 10 MG tablet    CLARITIN    90 tablet    Take 1 tablet (10 mg) by mouth daily    Acute nasopharyngitis       meclizine 12.5 MG tablet    ANTIVERT    90 tablet    Take 1 tablet (12.5 mg) by mouth 3 times daily as needed for dizziness    Disorientation       NYSTOP 732300 UNIT/GM Powd   Generic drug:  nystatin     60 g    Externally apply 1 g topically 3 times daily as needed    Fungal infection       ondansetron 4 MG ODT tab    ZOFRAN ODT    20 tablet    Take 1-2 tablets (4-8 mg) by mouth every 8 hours as needed for nausea    Nausea       order for DME     3 catheter    Equipment being ordered: Johnson Catheters - 18 french    Johnson catheter status       * order for DME     100 pad    Equipment being ordered: Blue Chux Please send to [facility]    Fecal incontinence       * order for DME     1 each    Equipment being ordered: cuaQea FX CPAP interface (nasal pillows), size XS.    Obstructive sleep apnea syndrome       * order for DME     12 each    Equipment being ordered: Silver Impregnated catheters HX of frequent UTI    Recurrent UTI       pravastatin 10 MG tablet    PRAVACHOL    90 tablet    TAKE ONE TABLET BY MOUTH EVERY DAY    Hyperlipidemia LDL goal <100       simethicone 125 MG Chew chewable tablet    MYLICON    120 tablet    Take 1 tablet (125 mg) by mouth as needed for intestinal gas        syringe/needle (disp) 22G X 1-1/2\" 3 ML Misc    B-D SYRINGE/NEEDLE    50 each    Inject 1 Syringe into the muscle every 30 days    Iron deficiency anemia, unspecified iron deficiency anemia type       * tacrolimus 1 MG capsule    PROGRAF - GENERIC " EQUIVALENT    180 capsule    Take 1 capsule (1 mg) by mouth 2 times daily    Kidney replaced by transplant       * tacrolimus 0.5 MG capsule    PROGRAF - GENERIC EQUIVALENT    180 capsule    Take 1 capsule (0.5 mg) by mouth 2 times daily    Kidney transplant recipient       TraMADol HCl 50 MG Tbdp     90 tablet    Take 50 mg by mouth 3 times daily as needed    Chronic shoulder pain, unspecified laterality       TRILEPTAL 300 MG tablet   Generic drug:  OXcarbazepine     180 tablet    Take 1 tablet (300 mg) by mouth 2 times daily For pain    Diabetic polyneuropathy associated with diabetes mellitus due to underlying condition (H)       * vitamin D 2000 UNITS tablet     100 tablet    Take 2,000 Units by mouth 2 times daily    Vitamin deficiency       * Cholecalciferol 5000 UNITS Tabs    ELMER-RUL VITAMIN D    90 tablet    Take 5,000 IU daily    Vitamin deficiency       * Notice:  This list has 7 medication(s) that are the same as other medications prescribed for you. Read the directions carefully, and ask your doctor or other care provider to review them with you.

## 2017-07-26 NOTE — PROGRESS NOTES
SUBJECTIVE/OBJECTIVE:                Gem Jade is a 71 year old female seen at their home for a follow-up visit for Medication Therapy Management.  She was referred to me from Marivel Barcenas.  Keshawn present for part of the visit. Pharmacy resident Wendie also present.      Chief Complaint: Follow up from our visit on 6/28/17.  Insurance issues  [employer]  [phone]  ID [id number] grp rxcvsd pcn meddadv  Shabbir mcintosh (transplant coordinator) [phone]    Tobacco: History of tobacco dependence - quit   Alcohol: not currently using    Medication Adherence: issues found and discussed below and has assistance setting up med boxes (Keshawn)    Catheter discomfort: currently using lidocaine prior to catheter changes but out of product. Has old lidocaine 2% gel RN is using but doesn't think this is the correct strength/vehicle. Does not use lidocaine in between catheter changes. Per Daniel SAPP review of [facility], HCRNs have been using lidocaine 2% gel, applying 10ML prior to catheter change.    S/p kidney transplant: currently taking tacrolimus 1.5mg BID and prednisone 5mg daily. Labs have been recently completed by transplant clinic and addressed. States they have been receiving numerous letters from [employer] insurance stating she will no longer have insurance coverage for tacrolimus and prednisone as of mid-August. Called [facility] pharmacy where last Rx was sent and discussed with pharmacist.  Provided information for pharmacist to relay to Medicare part B for drug coverage.  Date of transplant: 10/27/99. Date of discharge from TCU: 12/14/99. Pharmacist states that Medicare also requires verification that pt had Medicare at the time of her transplant and pt was unable to provide this verification. Pt has low supply of prednisone, without insurance cost is $4 for 30-days.     Hypertension: Currently taking labetalol 150mg BID and has been well controlled. Lowest readings 90s systolic.    Patient does self-monitor BP with wrist cuff, HCRN also monitors. Home BP monitoring in range of 120-150's systolic over 40-70's diastolic.  Denies any SE to labetalol    Diabetes: briefly discussion insulin cost. Picked up vials of Lantus and Novolog from [facility] and cost was equivalent to pens from [facility]. Pt currently in the Medicare Coverage gap. Keshawn knowledgeable on technique for use of vial/syringe. Out of time on further discussion and pt has no concerns on BS control today.     Current labs include:  BP Readings from Last 3 Encounters:   07/05/17 145/68   06/28/17 120/44   06/07/17 130/82     Today's Vitals: /78  Pulse 60  SpO2 94%  Lab Results   Component Value Date    A1C 5.6 07/13/2017   .  Lab Results   Component Value Date    CHOL 164 12/05/2016     Lab Results   Component Value Date    TRIG 272 12/05/2016     Lab Results   Component Value Date    HDL 46 12/05/2016     Lab Results   Component Value Date    LDL 64 12/05/2016       Liver Function Studies -   Recent Labs   Lab Test  04/26/17   0638   PROTTOTAL  7.3   ALBUMIN  3.0*   BILITOTAL  0.6   ALKPHOS  72   AST  20   ALT  23       Lab Results   Component Value Date    UCRR 27 04/26/2017    MICROL 1850 04/26/2017    UMALCR 6776.56 (H) 04/26/2017       Last Basic Metabolic Panel:  Lab Results   Component Value Date     07/13/2017      Lab Results   Component Value Date    POTASSIUM 4.3 07/13/2017     Lab Results   Component Value Date    CHLORIDE 96 07/13/2017     Lab Results   Component Value Date    BUN 38 07/13/2017     Lab Results   Component Value Date    CR 1.23 07/13/2017     GFR Estimate   Date Value Ref Range Status   07/13/2017 43 (L) >60 mL/min/1.7m2 Final     Comment:     Non  GFR Calc   06/22/2017 47 (L) >60 mL/min/1.7m2 Final     Comment:     Non  GFR Calc   05/31/2017 42 (L) >60 mL/min/1.7m2 Final     Comment:     Non  GFR Calc     GFR Estimate If Black   Date  Value Ref Range Status   07/13/2017 52 (L) >60 mL/min/1.7m2 Final     Comment:      GFR Calc   06/22/2017 57 (L) >60 mL/min/1.7m2 Final     Comment:      GFR Calc   05/31/2017 51 (L) >60 mL/min/1.7m2 Final     Comment:      GFR Calc     TSH   Date Value Ref Range Status   03/09/2017 1.20 0.40 - 4.00 mU/L Final   ]    Most Recent Immunizations   Administered Date(s) Administered     HepB-Peds 01/13/2003     Influenza (H1N1) 11/25/2009     Influenza (High Dose) 3 valent vaccine 10/07/2016     Influenza (IIV3) 11/05/2011     Influenza Vaccine IM 3yrs+ 4 Valent IIV4 10/16/2013     Pneumococcal (PCV 13) 12/01/2015     Pneumococcal 23 valent 06/17/2013     TDAP Vaccine (Adacel) 06/17/2013     Zoster vaccine, live 06/17/2013       ASSESSMENT:              Current medications were reviewed today as discussed above.      Medication Adherence: no issues identified    Catheter discomfort: stable. Refilled Lidocaine 2% gel  S/p kidney transplant: stable. Pt to refill 30-days prednisone at [facility]. Will follow-up with [facility] staff regarding insurance coverage of her anti-rejection meds.  Hypertension: stable. BP at goal <140/90. Labetalol working well, however would prefer to switch pt at some point to alternative therapy since no other compelling indication for labetalol. Due to multiple stressors with obtaining medications at this time, deferred to another appt.  Diabetes: unable to assess today with limited time. Deferred to f/u. Offered to further discuss switching pt to NPH and R insulins for cost savings; Keshawn will consider.      PLAN:                 prednisone at [facility]  Refilled Lidocaine 2%    I spent 60 minutes with this patient today  .  All changes were made via collaborative practice agreement with Marivel Barcenas. A copy of the visit note was provided to the patient's primary care provider.     Will follow up in 1 month.    The patient was sent via [employer]  summary of these recommendations as an after visit summary.    Conchita Toledo, PharmD, BCACP

## 2017-07-27 DIAGNOSIS — Z94.0 KIDNEY REPLACED BY TRANSPLANT: Primary | ICD-10-CM

## 2017-07-27 RX ORDER — PREDNISONE 5 MG/1
5 TABLET ORAL DAILY
Qty: 90 TABLET | Refills: 3 | Status: SHIPPED | OUTPATIENT
Start: 2017-07-27 | End: 2017-09-06

## 2017-07-27 NOTE — TELEPHONE ENCOUNTER
Drug Name: prednisone 5mg  Last Fill Date: 4/28/17   Quantity: 90    Rhonda Armenta   [facility]  [phone]

## 2017-07-31 NOTE — PATIENT INSTRUCTIONS
Recommendations from today's MTM visit:                                                        1. Refilled prednisone at [facility] at [employer] for 1 month supply.  Checked with [facility] and states there are no problems with refilling. When you need a refill, please call [facility].     2. Ordered lidocaine 2% gel for use with catheter changes.    Next MTM visit: 1 month with Klaudia    To schedule another MTM appointment, please call the clinic directly or you may call the MTM scheduling line at [phone] or toll-free at [phone].     My Clinical Pharmacist's contact information:                                                      It was a pleasure seeing you today!  Please feel free to contact me with any questions or concerns you have.      Conchita Toledo, PharmD, BCACP     You may receive a survey about the MTM services you received.  I would appreciate your feedback to help me serve you better in the future. Please fill it out and return it when you can. Your comments will be anonymous.

## 2017-07-31 NOTE — PROGRESS NOTES
Spoke with LIONEL Ghosh regarding prior auth for Tacrolimus. Called [facility] and states no current insurance issues for tacrolimus or prednisone. Pt can ignore letter from insurance company.     Called pt's  Keshawn to update on received information.     Conchita Toledo, PharmD, BCACP

## 2017-08-07 ENCOUNTER — TELEPHONE (OUTPATIENT)
Dept: FAMILY MEDICINE | Facility: CLINIC | Age: 72
End: 2017-08-07

## 2017-08-07 NOTE — TELEPHONE ENCOUNTER
Reason for call:  Order   Order or referral being requested: Brenna, from [facility] OT --- 2 in the month of August to work on Ul[employer] lift transfers - she is also discussing with patient and family about getting the patient a reclining wheelchair. She will fax us over paperwork for it if they agree to it, but would need a F2F done at next visit.       Phone number to reach patient:  Other phone number:  [phone] (Brenna)    Best Time:  anytime    Can we leave a detailed message on this number?  YES     Radha Valdes  [facility]

## 2017-08-07 NOTE — TELEPHONE ENCOUNTER
Left voicemail for [facility] giving verbal ok on order requested per RN protocol.    Routing to provider and TC as FYI that patient will need F2F at next visit as noted by Brenna.    Wendie Harris, RN

## 2017-08-09 ENCOUNTER — TELEPHONE (OUTPATIENT)
Dept: FAMILY MEDICINE | Facility: CLINIC | Age: 72
End: 2017-08-09

## 2017-08-10 DIAGNOSIS — D84.9 IMMUNOSUPPRESSED STATUS (H): ICD-10-CM

## 2017-08-10 DIAGNOSIS — Z94.0 KIDNEY TRANSPLANT RECIPIENT: ICD-10-CM

## 2017-08-10 DIAGNOSIS — Z79.60 LONG-TERM USE OF IMMUNOSUPPRESSANT MEDICATION: ICD-10-CM

## 2017-08-10 LAB
ERYTHROCYTE [DISTWIDTH] IN BLOOD BY AUTOMATED COUNT: 13.7 % (ref 10–15)
HCT VFR BLD AUTO: 36.4 % (ref 35–47)
HGB BLD-MCNC: 11.5 G/DL (ref 11.7–15.7)
MCH RBC QN AUTO: 30.8 PG (ref 26.5–33)
MCHC RBC AUTO-ENTMCNC: 31.6 G/DL (ref 31.5–36.5)
MCV RBC AUTO: 98 FL (ref 78–100)
PLATELET # BLD AUTO: 147 10E9/L (ref 150–450)
RBC # BLD AUTO: 3.73 10E12/L (ref 3.8–5.2)
WBC # BLD AUTO: 5.6 10E9/L (ref 4–11)

## 2017-08-10 PROCEDURE — 80048 BASIC METABOLIC PNL TOTAL CA: CPT | Performed by: INTERNAL MEDICINE

## 2017-08-10 PROCEDURE — 85027 COMPLETE CBC AUTOMATED: CPT | Performed by: INTERNAL MEDICINE

## 2017-08-10 PROCEDURE — 36415 COLL VENOUS BLD VENIPUNCTURE: CPT | Performed by: INTERNAL MEDICINE

## 2017-08-10 PROCEDURE — 80197 ASSAY OF TACROLIMUS: CPT | Performed by: INTERNAL MEDICINE

## 2017-08-11 ENCOUNTER — TELEPHONE (OUTPATIENT)
Dept: NURSING | Facility: CLINIC | Age: 72
End: 2017-08-11

## 2017-08-11 LAB
ANION GAP SERPL CALCULATED.3IONS-SCNC: 7 MMOL/L (ref 3–14)
BUN SERPL-MCNC: 48 MG/DL (ref 7–30)
CALCIUM SERPL-MCNC: 8 MG/DL (ref 8.5–10.1)
CHLORIDE SERPL-SCNC: 106 MMOL/L (ref 94–109)
CO2 SERPL-SCNC: 26 MMOL/L (ref 20–32)
CREAT SERPL-MCNC: 1.77 MG/DL (ref 0.52–1.04)
GFR SERPL CREATININE-BSD FRML MDRD: 28 ML/MIN/1.7M2
GLUCOSE SERPL-MCNC: 113 MG/DL (ref 70–99)
POTASSIUM SERPL-SCNC: 4.7 MMOL/L (ref 3.4–5.3)
SODIUM SERPL-SCNC: 139 MMOL/L (ref 133–144)
TACROLIMUS BLD-MCNC: ABNORMAL UG/L (ref 5–15)
TME LAST DOSE: ABNORMAL H

## 2017-08-11 NOTE — TELEPHONE ENCOUNTER
Home care nurse requesting continuation of home care services including catheter care, as needed visits, B12 injections, and barrier cream application.  Per [facility] policy continued home care visits are approved, nurse will have to get medication orders from the provider.  Home Care Nurse plans to send a request to the provider.     Nanda Becerra RN/FNA

## 2017-08-14 DIAGNOSIS — Z94.0 KIDNEY TRANSPLANT RECIPIENT: Primary | ICD-10-CM

## 2017-08-14 DIAGNOSIS — Z94.0 KIDNEY REPLACED BY TRANSPLANT: ICD-10-CM

## 2017-08-14 DIAGNOSIS — Z79.60 LONG-TERM USE OF IMMUNOSUPPRESSANT MEDICATION: ICD-10-CM

## 2017-08-14 RX ORDER — TACROLIMUS 1 MG/1
1 CAPSULE ORAL 2 TIMES DAILY
Qty: 180 CAPSULE | Refills: 3 | Status: SHIPPED | OUTPATIENT
Start: 2017-08-14 | End: 2017-08-16

## 2017-08-14 RX ORDER — TACROLIMUS 0.5 MG/1
0.5 CAPSULE ORAL 2 TIMES DAILY
Qty: 180 CAPSULE | Refills: 3 | Status: SHIPPED | OUTPATIENT
Start: 2017-08-14 | End: 2017-09-06 | Stop reason: DRUGHIGH

## 2017-08-14 NOTE — TELEPHONE ENCOUNTER
Tacrolimus level <3.0 on dose of 1 mg twice a day  Goal 12-hour level 3-5.    PLAN:  Increase tacrolimus dose to 1.5 mg twice a day.  Check tacrolimus level 1-2 weeks after dose change    TASK:   Call patient or her spouse, Chang, with instructions for dose change and blood test   (place order in Epic -- labs are drawn in their home)

## 2017-08-16 RX ORDER — TACROLIMUS 0.5 MG/1
CAPSULE ORAL
Qty: 180 CAPSULE | Refills: 3
Start: 2017-08-16 | End: 2017-10-18

## 2017-08-16 RX ORDER — TACROLIMUS 1 MG/1
2 CAPSULE ORAL 2 TIMES DAILY
Qty: 180 CAPSULE | Refills: 3 | Status: SHIPPED | OUTPATIENT
Start: 2017-08-16 | End: 2017-09-22

## 2017-08-16 NOTE — TELEPHONE ENCOUNTER
Call placed to patient  Chang: He states that patient is currently taking 1.5 mg twice per day. He verbalizes understanding to increase dose to 2 mg twice per day. Order sent

## 2017-08-17 ENCOUNTER — TELEPHONE (OUTPATIENT)
Dept: FAMILY MEDICINE | Facility: CLINIC | Age: 72
End: 2017-08-17

## 2017-08-17 NOTE — TELEPHONE ENCOUNTER
Forms received via fax from [facility], for wheelchair. Awaiting F2F visit on 9/6 to complete forms then will fax to [facility] (Fax - [phone], Phone - [phone])    Radha Valdes  [facility]

## 2017-08-21 ENCOUNTER — TELEPHONE (OUTPATIENT)
Dept: FAMILY MEDICINE | Facility: CLINIC | Age: 72
End: 2017-08-21

## 2017-08-21 DIAGNOSIS — Z94.0 KIDNEY TRANSPLANT RECIPIENT: ICD-10-CM

## 2017-08-21 DIAGNOSIS — Z94.0 KIDNEY REPLACED BY TRANSPLANT: Primary | ICD-10-CM

## 2017-08-21 DIAGNOSIS — D84.9 IMMUNOSUPPRESSED STATUS (H): ICD-10-CM

## 2017-08-21 DIAGNOSIS — Z94.0 KIDNEY REPLACED BY TRANSPLANT: ICD-10-CM

## 2017-08-21 DIAGNOSIS — Z79.60 LONG-TERM USE OF IMMUNOSUPPRESSANT MEDICATION: ICD-10-CM

## 2017-08-21 LAB
ERYTHROCYTE [DISTWIDTH] IN BLOOD BY AUTOMATED COUNT: 13.6 % (ref 10–15)
HCT VFR BLD AUTO: 35.3 % (ref 35–47)
HGB BLD-MCNC: 11.2 G/DL (ref 11.7–15.7)
MCH RBC QN AUTO: 30.4 PG (ref 26.5–33)
MCHC RBC AUTO-ENTMCNC: 31.7 G/DL (ref 31.5–36.5)
MCV RBC AUTO: 96 FL (ref 78–100)
PLATELET # BLD AUTO: 131 10E9/L (ref 150–450)
RBC # BLD AUTO: 3.69 10E12/L (ref 3.8–5.2)
WBC # BLD AUTO: 5.6 10E9/L (ref 4–11)

## 2017-08-21 PROCEDURE — 80197 ASSAY OF TACROLIMUS: CPT | Performed by: PEDIATRICS

## 2017-08-21 PROCEDURE — 36415 COLL VENOUS BLD VENIPUNCTURE: CPT | Performed by: PEDIATRICS

## 2017-08-21 PROCEDURE — 80048 BASIC METABOLIC PNL TOTAL CA: CPT | Performed by: PEDIATRICS

## 2017-08-21 PROCEDURE — 85027 COMPLETE CBC AUTOMATED: CPT | Performed by: PEDIATRICS

## 2017-08-21 PROCEDURE — 80195 ASSAY OF SIROLIMUS: CPT | Performed by: NURSE PRACTITIONER

## 2017-08-21 NOTE — TELEPHONE ENCOUNTER
Call from [facility] stating need order for one more transplant lab.    Spoke with Mary who states order not in for sirolimis level.  Order placed and signed as verbal order.    Wendie Harris RN

## 2017-08-22 DIAGNOSIS — Z94.0 KIDNEY TRANSPLANT RECIPIENT: Primary | ICD-10-CM

## 2017-08-22 DIAGNOSIS — Z79.60 LONG-TERM USE OF IMMUNOSUPPRESSANT MEDICATION: ICD-10-CM

## 2017-08-22 DIAGNOSIS — D84.9 IMMUNOSUPPRESSED STATUS (H): ICD-10-CM

## 2017-08-22 LAB
ANION GAP SERPL CALCULATED.3IONS-SCNC: 9 MMOL/L (ref 3–14)
BUN SERPL-MCNC: 51 MG/DL (ref 7–30)
CALCIUM SERPL-MCNC: 8 MG/DL (ref 8.5–10.1)
CHLORIDE SERPL-SCNC: 102 MMOL/L (ref 94–109)
CO2 SERPL-SCNC: 24 MMOL/L (ref 20–32)
CREAT SERPL-MCNC: 1.4 MG/DL (ref 0.52–1.04)
GFR SERPL CREATININE-BSD FRML MDRD: 37 ML/MIN/1.7M2
GLUCOSE SERPL-MCNC: 182 MG/DL (ref 70–99)
POTASSIUM SERPL-SCNC: 4.4 MMOL/L (ref 3.4–5.3)
SIROLIMUS BLD-MCNC: <2 UG/L (ref 5–15)
SODIUM SERPL-SCNC: 135 MMOL/L (ref 133–144)
TACROLIMUS BLD-MCNC: <3 UG/L (ref 5–15)
TME LAST DOSE: ABNORMAL H
TME LAST DOSE: ABNORMAL H

## 2017-08-23 ENCOUNTER — TELEPHONE (OUTPATIENT)
Dept: FAMILY MEDICINE | Facility: CLINIC | Age: 72
End: 2017-08-23

## 2017-09-05 DIAGNOSIS — Z53.9 DIAGNOSIS NOT YET DEFINED: Primary | ICD-10-CM

## 2017-09-05 PROCEDURE — G0179 MD RECERTIFICATION HHA PT: HCPCS

## 2017-09-06 ENCOUNTER — OFFICE VISIT (OUTPATIENT)
Dept: FAMILY MEDICINE | Facility: CLINIC | Age: 72
End: 2017-09-06
Payer: COMMERCIAL

## 2017-09-06 ENCOUNTER — OFFICE VISIT (OUTPATIENT)
Dept: PHARMACY | Facility: CLINIC | Age: 72
End: 2017-09-06
Payer: COMMERCIAL

## 2017-09-06 VITALS — OXYGEN SATURATION: 99 % | DIASTOLIC BLOOD PRESSURE: 50 MMHG | HEART RATE: 61 BPM | SYSTOLIC BLOOD PRESSURE: 120 MMHG

## 2017-09-06 DIAGNOSIS — E11.42 TYPE 2 DIABETES MELLITUS WITH DIABETIC POLYNEUROPATHY, WITH LONG-TERM CURRENT USE OF INSULIN (H): Primary | ICD-10-CM

## 2017-09-06 DIAGNOSIS — R53.81 PHYSICAL DECONDITIONING: ICD-10-CM

## 2017-09-06 DIAGNOSIS — E11.42 DIABETIC POLYNEUROPATHY ASSOCIATED WITH TYPE 2 DIABETES MELLITUS (H): ICD-10-CM

## 2017-09-06 DIAGNOSIS — M25.519 CHRONIC SHOULDER PAIN, UNSPECIFIED LATERALITY: ICD-10-CM

## 2017-09-06 DIAGNOSIS — E66.01 MORBID OBESITY, UNSPECIFIED OBESITY TYPE (H): ICD-10-CM

## 2017-09-06 DIAGNOSIS — I10 BENIGN ESSENTIAL HYPERTENSION: ICD-10-CM

## 2017-09-06 DIAGNOSIS — Z79.4 TYPE 2 DIABETES MELLITUS WITH DIABETIC POLYNEUROPATHY, WITH LONG-TERM CURRENT USE OF INSULIN (H): Primary | ICD-10-CM

## 2017-09-06 DIAGNOSIS — G89.29 CHRONIC SHOULDER PAIN, UNSPECIFIED LATERALITY: ICD-10-CM

## 2017-09-06 DIAGNOSIS — Z94.0 KIDNEY REPLACED BY TRANSPLANT: ICD-10-CM

## 2017-09-06 DIAGNOSIS — R42 VERTIGO: ICD-10-CM

## 2017-09-06 DIAGNOSIS — Z94.0 KIDNEY REPLACED BY TRANSPLANT: Primary | ICD-10-CM

## 2017-09-06 DIAGNOSIS — M62.81 GENERALIZED MUSCLE WEAKNESS: ICD-10-CM

## 2017-09-06 PROCEDURE — 99606 MTMS BY PHARM EST 15 MIN: CPT | Performed by: PHARMACIST

## 2017-09-06 PROCEDURE — 99350 HOME/RES VST EST HIGH MDM 60: CPT | Performed by: NURSE PRACTITIONER

## 2017-09-06 PROCEDURE — 99607 MTMS BY PHARM ADDL 15 MIN: CPT | Performed by: PHARMACIST

## 2017-09-06 RX ORDER — PREDNISONE 5 MG/1
5 TABLET ORAL DAILY
Qty: 90 TABLET | Refills: 3 | Status: SHIPPED | OUTPATIENT
Start: 2017-09-06 | End: 2018-10-30

## 2017-09-06 NOTE — PROGRESS NOTES
SUBJECTIVE:                                                    Gem Jade is a 68 year old female who is seen in her home due to inability to leave the home unassisted, morbid obesity, severe vertigo, generalized weakness, chronic pain, bedbound, rarely able to get up in the WC, seen today for the following health issues, Visit today is in part for a high back WC, seen with PharmD        KIDNEY TRANSPLANT       Duration: right kidney replaced, left one non functional     Description (location/character/radiation): chronic managed by kidney transplant team, KACI Jacinto      Intensity:  moderate    Accompanying signs and symptoms: fatigue,   History (similar episodes/previous evaluation):  kidney transplant 10/29/1999      Precipitating or alleviating factors: DM, bed bound, rarely gets up in her WC     Therapies tried and outcome: see transplant team notes, last labs done 8/21/17          Diabetes Follow-up       Patient is checking blood sugars: 1-2x daily    Diabetic concerns: frequent infections and other -  recent increased lantus to 24, Novolog 10u 2x daily and sliding scale      Pt currently taking Lantus 24 units daily, Novolog 10 units with meals BID when not testing BS and uses sliding scale 2 units for every 50 over 201. Has been doing better with remembering Novolog.gars:     AM- fasting no readings, random-  151, 304, 182, 161, 123    nonfasting - 163, 189, 214, 111, 222, 241, 190,124    Recent increase to 24U of Lantus, taking Novolog 2x daily    Trying to eat 2x daily, still not getting up until noon, naps after dinner, then up until 4am         Symptoms of hypoglycemia (low blood sugar): shaky, weak, lethargy, confusion, Symptoms occur if sugars < below 50.     Paresthesias (numbness or burning in feet) or sores: Yes hands and feet, using gabapentin with good relief     Date of last diabetic eye exam: unsure  HgbA1c 5.6 7/13/2017                   Amount of exercise or physical activity: None,  bed bound, getting up in her wheel chair now several times, due to weakness and vertigo needs a high back WC so she can tilt back when her vertigo gets bad, working with PT and OT to improve strength     Problems taking medications regularly: No,  set them up and adm them    Medication side effects: none  Diet: diabetic, appetite has improved, diet improved, less simple carbs, following renal diet         Musculoskeletal problem/pain       Duration: Chronic, shoulder is stable today, generalized weakness and physical deconditioning      Description  Location: shoulders, left the worse     Intensity:  Moderate,2-7 /10    Accompanying signs and symptoms: numbness and weakness in UE    History  Previous similar problem: YES  Previous evaluation:  x-ray, MRI and orthopedic evaluation  RIGHT SHOULDER THREE VIEWS 5/19/2013 2:23 PM  HISTORY: Rule out fracture.  COMPARISON: 9/14/2012.  FINDINGS: Again seen are degenerative changes of the  acromioclavicular joint. The right shoulder is otherwise unremarkable  and unchanged.    Precipitating or alleviating factors:  Trauma or overuse: YES- uses her arms to turn herself in bed  Aggravating factors include: any use of UE  Therapies tried and outcome: using acetaminophen, using icy hot gel and tramadol rarely, working on core strength, still using chinese patches and working well, working with OT and PT to get up in her chair               Hypertension Follow-up       Outpatient blood pressures are being checked by [facility], and patient with a wrist device    No longer taking norvasc, or losartan, labetalol reduced to 150mg 2x daily and holding if systolic under 120    Low Salt Diet: not monitoring salt        UTI - Female  Duration of complaint:  [facility] inflated the balloon to 30cc and the leaking stopped     Dizziness  Duration of complaint: Long hx of vertigo, dx with core vertigo, becomes very nauseated and vomits when she attempts to get up, this prevents her from getting  up in her WC, uses Zofran with some relief, tilting back in her WC would help alleviate her vertigo and allow her to get out of bed and go to appts, in order to do this she will need a high back manual WC    The patient has a mobility limitation that significantly impairs their ability to participate in on or more mobility-related activities of daily living (MRADLs) due to generalized weakness, physical deconditioning, morbid obesity, chronic vertigo,  The patient's mobility limitation cannot be sufficiently resolved by the use of an appropriately fitted cane or walker. The patient's home provides adequate access between rooms, maneuvering space and surfaces for the use of the manual high back wheelchair provided. Use of the manual high back wheelchair will significantly improves the patient's ability to participate in MRADLs and the patient will use it on a regular basis within the home and to facilitate exit from the home. The patient has not expressed an unwillingness to use the manual high back wheelchair that is provided in the home    The patient has a caregiver who is available, willing, and able to provide assistance with the wheelchair when the patient has limitations      Problem list and histories reviewed & adjusted, as indicated.  Additional history: as documented  Recent Labs   Lab Test  08/21/17   1210  08/10/17   1015  07/13/17   1105   04/26/17   0638   04/22/17   0942   04/19/17   0005   03/09/17   1310   12/05/16   1210   08/30/16   0032   06/13/16   1140   02/05/15   1250   05/16/13   1240   A1C   --    --   5.6   --    --    --    --    --    --    --    --    --   7.4*   --    --    --   6.7*   < >  6.9*   < >  7.2*   LDL   --    --    --    --    --    --    --    --    --    --    --    --   64   --    --    --    --    --   18   --   149*   HDL   --    --    --    --    --    --    --    --    --    --    --    --   46*   --    --    --    --    --   43*   --   42*   TRIG   --    --    --     --    --    --    --    --    --    --    --    --   272*   --    --    --    --    --   223*   --   262*   ALT   --    --    --    --   23   --   42   --   44   < >   --    < >   --    < >  6   < >   --    < >   --    < >   --    CR  1.40*  1.77*  1.23*   < >  1.71*   < >  1.16*   < >  1.53*   < >   --    < >   --    < >   --    < >   --    < >  0.67   < >  0.70   GFRESTIMATED  37*  28*  43*   < >  29*   < >  46*   < >  34*   < >   --    < >   --    < >   --    < >   --    < >  87   < >  84   GFRESTBLACK  45*  34*  52*   < >  36*   < >  56*   < >  41*   < >   --    < >   --    < >   --    < >   --    < >  >90   GFR Calc     < >  >90   POTASSIUM  4.4  4.7  4.3   < >  3.8   < >  3.5   < >  4.1   < >   --    < >   --    < >   --    < >   --    < >  4.3   < >  3.9   TSH   --    --    --    --    --    --    --    --    --    --   1.20   --    --    --   1.01   --    --    < >   --    < >   --     < > = values in this interval not displayed.      BP Readings from Last 3 Encounters:   07/26/17 110/78   07/05/17 145/68   06/28/17 120/44    Wt Readings from Last 3 Encounters:   04/27/17 199 lb 8 oz (90.5 kg)   03/15/17 201 lb (91.2 kg)   02/20/17 205 lb 4.8 oz (93.1 kg)            Labs reviewed in EPIC    Reviewed and updated as needed this visit by clinical staff       Reviewed and updated as needed this visit by Provider         ROS:  Constitutional, HEENT, cardiovascular, pulmonary, gi and gu systems are negative, except as otherwise noted.    OBJECTIVE:                                                    /50 (BP Location: Left arm, Patient Position: Supine, Cuff Size: Adult Regular)  Pulse 61  SpO2 99%  Last weight 4/2017 199lbs and 8oz  There is no height or weight on file to calculate BMI.  GENERAL: alert, mild distress, obese, elderly and laying on her back in her bed, engaged in the conversation   RESP: lungs clear to auscultation - no rales, rhonchi or wheezes  CV: regular rate and rhythm,  normal S1 S2, no S3 or S4, no murmur, click or rub, no peripheral edema   ABDOMEN: soft, nontender, no masses and bowel sounds normal  MS: no gross musculoskeletal defects, limited ROM in shoulders,hips generalized weakness noted, no edema  SKIN: no suspicious lesions or rashes  PSYCH: mentation appears normal, affect normal/bright    Diagnostic Test Results:  Results for orders placed or performed in visit on 08/21/17   CBC with platelets   Result Value Ref Range    WBC 5.6 4.0 - 11.0 10e9/L    RBC Count 3.69 (L) 3.8 - 5.2 10e12/L    Hemoglobin 11.2 (L) 11.7 - 15.7 g/dL    Hematocrit 35.3 35.0 - 47.0 %    MCV 96 78 - 100 fl    MCH 30.4 26.5 - 33.0 pg    MCHC 31.7 31.5 - 36.5 g/dL    RDW 13.6 10.0 - 15.0 %    Platelet Count 131 (L) 150 - 450 10e9/L   Basic metabolic panel   Result Value Ref Range    Sodium 135 133 - 144 mmol/L    Potassium 4.4 3.4 - 5.3 mmol/L    Chloride 102 94 - 109 mmol/L    Carbon Dioxide 24 20 - 32 mmol/L    Anion Gap 9 3 - 14 mmol/L    Glucose 182 (H) 70 - 99 mg/dL    Urea Nitrogen 51 (H) 7 - 30 mg/dL    Creatinine 1.40 (H) 0.52 - 1.04 mg/dL    GFR Estimate 37 (L) >60 mL/min/1.7m2    GFR Estimate If Black 45 (L) >60 mL/min/1.7m2    Calcium 8.0 (L) 8.5 - 10.1 mg/dL   Tacrolimus level   Result Value Ref Range    Tacrolimus Last Dose  8/21/17 0030     Tacrolimus Level <3.0 (L) 5.0 - 15.0 ug/L   Sirolimus level   Result Value Ref Range    Sirolimus Last Dose  8/21/17 0030     Sirolimus Level <2.0 (L) 5.0 - 15.0 ug/L     *Note: Due to a large number of results and/or encounters for the requested time period, some results have not been displayed. A complete set of results can be found in Results Review.        ASSESSMENT/PLAN:                                                    ASSESSMENT / PLAN:  (Z94.0) Kidney replaced by transplant  (primary encounter diagnosis)  Comment: labs stable, transplant team managing meds   Plan: continue to monitor     (I10) Benign essential hypertension  Comment:  stable  Plan: no med changes    (E11.42) Diabetic polyneuropathy associated with type 2 diabetes mellitus (H)  Comment: improving with insulin changes and diet adjustments  Plan: will work with PharmD on med adjustments, discussed diet again today     (M25.519,  G89.29) Chronic shoulder pain, unspecified laterality  Comment: at baseline  Plan: continue present tx regime     (E66.01) Morbid obesity, unspecified obesity type (H)  Comment: continues to slowly lose weight, working on getting up in her WC but needs a high back WC, working on core strength with OT and PT  Plan: Will order a guerrero high back WC    (R42) Vertigo  Comment: chronic, some improvement with Zofran, prevent her from staying up in her WC even for a short time because unable to tilt back   Plan: will order a high back guerrero wheel chair       Marivel Barcenas, NP, APRN [facility]    Visit time 60   min with > than 50% of the time spent in counseling on: care coordination, DM, kidney transplant, shoulder pain, obesity, F2F for high back wheel chair

## 2017-09-06 NOTE — MR AVS SNAPSHOT
After Visit Summary   9/6/2017    Gem Jade    MRN: [id number]           Patient Information     Date Of Birth          1945        Visit Information        Provider Department      9/6/2017 2:00 PM Marivel Barcenas APRN CNP [facility]        Today's Diagnoses     Kidney replaced by transplant    -  1    Benign essential hypertension        Diabetic polyneuropathy associated with type 2 diabetes mellitus (H)        Chronic shoulder pain, unspecified laterality        Morbid obesity, unspecified obesity type (H)        Vertigo          Care Instructions    We will send in these notes for a high back wheelchair          Follow-ups after your visit        Your next 10 appointments already scheduled     Sep 28, 2017  2:00 PM CDT   Office Visit with Conchita Toledo [facility] ([facility])    [address] [place] [id number] [phone]           Bring a current list of meds and any records pertaining to this visit. For Physicals, please bring immunization records and any forms needing to be filled out. Please arrive 10 minutes early to complete paperwork.            Oct 04, 2017  2:00 PM CDT   Return Visit with ANTHONY Antonio CNP   [facility] ([facility])    [address] [place] [id number] [phone]              Who to contact     If you have questions or need follow up information about today's clinic visit or your schedule please contact [facility] directly at [phone].  Normal or non-critical lab and imaging results will be communicated to you by MyChart, letter or phone within 4 business days after the clinic has received the results. If you do not hear from us within 7 days, please contact the clinic through MyChart or phone. If you have a critical or abnormal lab  result, we will notify you by phone as soon as possible.  Submit refill requests through [employer] or call your pharmacy and they will forward the refill request to us. Please allow 3 business days for your refill to be completed.          Additional Information About Your Visit        [email or web address] Information     [employer] gives you secure access to your electronic health record. If you see a primary care provider, you can also send messages to your care team and make appointments. If you have questions, please call your primary care clinic.  If you do not have a primary care provider, please call [phone] and they will assist you.        Care EveryWhere ID     This is your Care EveryWhere ID. This could be used by other organizations to access your [facility] medical records  [id number]        Your Vitals Were     Pulse Pulse Oximetry                61 99%           Blood Pressure from Last 3 Encounters:   09/06/17 120/50   07/26/17 110/78   07/05/17 145/68    Weight from Last 3 Encounters:   04/27/17 199 lb 8 oz (90.5 kg)   03/15/17 201 lb (91.2 kg)   02/20/17 205 lb 4.8 oz (93.1 kg)              Today, you had the following     No orders found for display         Today's Medication Changes          These changes are accurate as of: 9/6/17 11:59 PM.  If you have any questions, ask your nurse or doctor.               Start taking these medicines.        Dose/Directions    insulin aspart 100 UNIT/ML injection   Commonly known as:  NovoLOG FLEXPEN   Used for:  Type 2 diabetes mellitus with diabetic polyneuropathy, with long-term current use of insulin (H)   Started by:  Conchita Toledo [facility]        Inject 10 units under the skin 2 times daily before meals as directed. Add sliding scale as needed. Avg use 25 units per day   Quantity:  15 mL   Refills:  11       insulin glargine 100 UNIT/ML injection   Commonly known as:  LANTUS SOLOSTAR   Used for:  Type 2 diabetes mellitus with diabetic polyneuropathy, with  long-term current use of insulin (H)   Replaces:  insulin glargine 100 UNIT/ML injection   Started by:  Conchita Toledo [facility]        Inject 24 units under the skin daily.   Quantity:  15 mL   Refills:  11         These medicines have changed or have updated prescriptions.        Dose/Directions    * tacrolimus 1 MG capsule   Commonly known as:  GENERIC EQUIVALENT   This may have changed:  Another medication with the same name was removed. Continue taking this medication, and follow the directions you see here.   Used for:  Kidney transplant recipient, Long-term use of immunosuppressant medication   Changed by:  Jorgito Jacinto RN        Dose:  2 mg   Take 2 capsules (2 mg) by mouth 2 times daily   Quantity:  180 capsule   Refills:  3       * tacrolimus 0.5 MG capsule   Commonly known as:  GENERIC EQUIVALENT   This may have changed:  Another medication with the same name was removed. Continue taking this medication, and follow the directions you see here.   Used for:  Kidney transplant recipient   Changed by:  Jorgito Jacinto RN        HOLD   Quantity:  180 capsule   Refills:  3       TraMADol HCl 50 MG Tbdp   Indication:  Moderate to Moderately Severe Pain   This may have changed:  when to take this   Used for:  Chronic shoulder pain, unspecified laterality        Dose:  50 mg   Take 50 mg by mouth 3 times daily as needed   Quantity:  90 tablet   Refills:  3       vitamin D 2000 UNITS tablet   This may have changed:  Another medication with the same name was removed. Continue taking this medication, and follow the directions you see here.   Used for:  Vitamin deficiency   Changed by:  Marivel Barcenas APRN CNP        Dose:  2000 Units   Take 2,000 Units by mouth 2 times daily   Quantity:  100 tablet   Refills:  3       * Notice:  This list has 2 medication(s) that are the same as other medications prescribed for you. Read the directions carefully, and ask your doctor or other care provider to  review them with you.      Stop taking these medicines if you haven't already. Please contact your care team if you have questions.     insulin glargine 100 UNIT/ML injection   Commonly known as:  LANTUS VIAL   Replaced by:  insulin glargine 100 UNIT/ML injection   Stopped by:  Conchita Toledo [facility]                Where to get your medicines      These medications were sent to [facility] - [place], [place] - [address]  [address] [facility], [facility] [address]    Hours:  Mon-Fri 8:30am-5:00pm Toll Free [phone] Phone:  [phone]     insulin aspart 100 UNIT/ML injection    insulin glargine 100 UNIT/ML injection    predniSONE 5 MG tablet                Primary Care Provider Office Phone # Fax #    Marivel Naomi Barcenas, APRN [facility] [phone]       [address] [facility] [address]  [facility] [address]        Equal Access to Services     KYLAH [facility] HospitalSULEIMAN : Hadii aad ku hadasho Soomaali, waaxda luqadaha, qaybta kaalmada adeegyada, waxay idiin hayaan anni aguilar . So [facility] [phone].    ATENCIÓN: Si halliela español, tiene a peguero disposición servicios gratuitos de asistencia lingüística. Llame al [phone].    We comply with applicable federal civil rights laws and [place] laws. We do not discriminate on the basis of race, color, national origin, age, disability sex, sexual orientation or gender identity.            Thank you!     Thank you for choosing [facility]  for your care. Our goal is always to provide you with excellent care. Hearing back from our patients is one way we can continue to improve our services. Please take a few minutes to complete the written survey that you may receive in the mail after your visit with us. Thank you!             Your Updated Medication List - Protect others around you: Learn how to safely use, store and throw away your medicines at www.disposemymeds.org.          This list is accurate as of:  9/6/17 11:59 PM.  Always use your most recent med list.                   Brand Name Dispense Instructions for use Diagnosis    ACCU-CHEK COMPACT PLUS test strip   Generic drug:  blood glucose monitoring     100 strip    Use to test blood sugar 3 times daily or as directed.  Ok to substitute alternative if insurance prefers.        ACETAMINOPHEN PO      Take 325-650 mg by mouth every 4 hours as needed.        alcohol swab prep pads     1 Box    Use to swab area of injection/yvon and vials as directed.    Type 2 diabetes, HbA1C goal < 8% (H)       aspirin 81 MG chewable tablet     90 tablet    Take 1 tablet (81 mg) by mouth daily Starting 1 week after your kidney biopsy    Pulmonary embolism and infarction (H), Diabetes mellitus with background retinopathy (H)       cinacalcet 30 MG tablet    SENSIPAR    60 tablet    Take 1 tablet (30 mg) by mouth daily    Kidney replaced by transplant, Hypercalcemia       cloNIDine 0.1 MG tablet    CATAPRES    60 tablet    Take 1 tablet (0.1 mg) by mouth 2 times daily    Johnson catheter in place       clotrimazole 1 % cream    LOTRIMIN    40 g    Apply topically 2 times daily Apply to buttocks twice a day until rash resolved.        Cranberry 400 MG Tabs      Take 400 mg by mouth 2 times daily        cyanocobalamin 1000 MCG/ML injection    VITAMIN B12    3 mL    Inject 1 mL (1,000 mcg) into the muscle every 30 days    Iron deficiency anemia, unspecified iron deficiency anemia type       diphenoxylate-atropine 2.5-0.025 MG per tablet    LOMOTIL    50 tablet    Take 1 tablet by mouth 4 times daily as needed for diarrhea    Diarrhea of presumed infectious origin       famotidine 20 MG tablet    PEPCID    60 tablet    Take 1 tablet (20 mg) by mouth 2 times daily        gabapentin 300 MG capsule    NEURONTIN    180 capsule    Take 3 capsules (900 mg) by mouth 2 times daily For pain    Diabetic polyneuropathy associated with type 2 diabetes mellitus (H)       GLUCAGON EMERGENCY 1 MG kit    Generic drug:  glucagon     1 each    Inject 1 mg into the muscle once    Hypoglycemia       insulin aspart 100 UNIT/ML injection    NovoLOG FLEXPEN    15 mL    Inject 10 units under the skin 2 times daily before meals as directed. Add sliding scale as needed. Avg use 25 units per day    Type 2 diabetes mellitus with diabetic polyneuropathy, with long-term current use of insulin (H)       insulin glargine 100 UNIT/ML injection    LANTUS SOLOSTAR    15 mL    Inject 24 units under the skin daily.    Type 2 diabetes mellitus with diabetic polyneuropathy, with long-term current use of insulin (H)       insulin pen needle 30G X 8 MM     100 each    Use as directed with Lantus    Type 2 diabetes mellitus with diabetic nephropathy (H)       insulin syringes (disposable) U-100 0.5 ML Misc     3 each    10 Units 3 times daily    Diabetes mellitus with background retinopathy (H)       labetalol 300 MG tablet    NORMODYNE    120 tablet    Take 1/2 tablet (150mg) BID. Hold for systolic BP less than 120.    Benign essential hypertension, Kidney replaced by transplant       lactobacillus rhamnosus (GG) capsule     60 capsule    Take 1 capsule by mouth 2 times daily    Diarrhea       levothyroxine 50 MCG tablet    SYNTHROID    90 tablet    Take 1 tablet (50 mcg) by mouth daily    Other specified hypothyroidism       lidocaine 2 % topical gel    XYLOCAINE    30 mL    Apply topically as needed for moderate pain 10 ML every 3 weeks    Johnson catheter in place       loratadine 10 MG tablet    CLARITIN    90 tablet    Take 1 tablet (10 mg) by mouth daily    Acute nasopharyngitis       meclizine 12.5 MG tablet    ANTIVERT    90 tablet    Take 1 tablet (12.5 mg) by mouth 3 times daily as needed for dizziness    Disorientation       NYSTOP 197955 UNIT/GM Powd   Generic drug:  nystatin     60 g    Externally apply 1 g topically 3 times daily as needed    Fungal infection       ondansetron 4 MG ODT tab    ZOFRAN ODT    20 tablet    Take  "1-2 tablets (4-8 mg) by mouth every 8 hours as needed for nausea    Nausea       order for DME     3 catheter    Equipment being ordered: Johnson Catheters - 18 french    Johnson catheter status       * order for DME     100 pad    Equipment being ordered: Blue Chux Please send to [facility]    Fecal incontinence       * order for DME     1 each    Equipment being ordered: Ahuja FX CPAP interface (nasal pillows), size XS.    Obstructive sleep apnea syndrome       * order for DME     12 each    Equipment being ordered: Silver Impregnated catheters HX of frequent UTI    Recurrent UTI       pravastatin 10 MG tablet    PRAVACHOL    90 tablet    TAKE ONE TABLET BY MOUTH EVERY DAY    Hyperlipidemia LDL goal <100       predniSONE 5 MG tablet    DELTASONE    90 tablet    Take 1 tablet (5 mg) by mouth daily    Kidney replaced by transplant       simethicone 125 MG Chew chewable tablet    MYLICON    120 tablet    Take 1 tablet (125 mg) by mouth as needed for intestinal gas        syringe/needle (disp) 22G X 1-1/2\" 3 ML Misc    B-D SYRINGE/NEEDLE    50 each    Inject 1 Syringe into the muscle every 30 days    Iron deficiency anemia, unspecified iron deficiency anemia type       * tacrolimus 1 MG capsule    GENERIC EQUIVALENT    180 capsule    Take 2 capsules (2 mg) by mouth 2 times daily    Kidney transplant recipient, Long-term use of immunosuppressant medication       * tacrolimus 0.5 MG capsule    GENERIC EQUIVALENT    180 capsule    HOLD    Kidney transplant recipient       TraMADol HCl 50 MG Tbdp     90 tablet    Take 50 mg by mouth 3 times daily as needed    Chronic shoulder pain, unspecified laterality       TRILEPTAL 300 MG tablet   Generic drug:  OXcarbazepine     180 tablet    Take 1 tablet (300 mg) by mouth 2 times daily For pain    Diabetic polyneuropathy associated with diabetes mellitus due to underlying condition (H)       vitamin D 2000 UNITS tablet     100 tablet    Take 2,000 Units by mouth 2 times daily    " Vitamin deficiency       * Notice:  This list has 5 medication(s) that are the same as other medications prescribed for you. Read the directions carefully, and ask your doctor or other care provider to review them with you.

## 2017-09-06 NOTE — PROGRESS NOTES
SUBJECTIVE/OBJECTIVE:                Gem Jade is a 71 year old female seen at their home for a follow-up visit for Medication Therapy Management.  She was referred to me from Marivel Barcenas. Seen as a covisit with PCP. Pharmacy student Kevon and pt's  Keshawn also present for the visit.     Chief Complaint: Follow up from our visit on 7/26/17.     Tobacco: History of tobacco dependence - quit   Alcohol: not currently using    Medication Adherence: issues found and discussed below and has assistance setting up med boxes (Keshawn). Kevon pharmacy student reviewed pill bottles and med boxes in detail today.     S/p kidney transplant: currently taking tacrolimus 1.5mg BID (did not increase to tac 2mg BID as recommended by transplant team) and prednisone 5mg daily. Labs are ordered for recheck tomorrow.     Diabetes:  Pt currently taking Lantus 24 units daily, Novolog 10 units with meals BID and uses sliding scale 2 units for every 50 over 201.   In Medicare coverage gap, again discussed switching Novolog/Lantus regimen to Relion NPH/R.   SMBG:  two - three times daily.   Ranges (glucometer) Fasting 161, 151, 111,222 PM: 163, 189, 182,214  Symptoms of low blood sugar? none. Frequency of hypoglycemia? none  Recent symptoms of high blood sugar? none  Pt is not taking an ACEi/ARB. (Hx worsening renal function when on ACE/ARB therapy)  Aspirin: taking aspirin 81mg daily without problems  Diet/exercise: has been eating 2 times a day, appetite is normal.     Current labs include:  BP Readings from Last 3 Encounters:   07/26/17 110/78   07/05/17 145/68   06/28/17 120/44       Lab Results   Component Value Date    A1C 5.6 07/13/2017   .  Lab Results   Component Value Date    CHOL 164 12/05/2016     Lab Results   Component Value Date    TRIG 272 12/05/2016     Lab Results   Component Value Date    HDL 46 12/05/2016     Lab Results   Component Value Date    LDL 64 12/05/2016       Liver Function Studies -   Recent  Labs   Lab Test  04/26/17   0638   PROTTOTAL  7.3   ALBUMIN  3.0*   BILITOTAL  0.6   ALKPHOS  72   AST  20   ALT  23       Lab Results   Component Value Date    UCRR 27 04/26/2017    MICROL 1850 04/26/2017    UMALCR 6776.56 (H) 04/26/2017       Last Basic Metabolic Panel:  Lab Results   Component Value Date     08/21/2017      Lab Results   Component Value Date    POTASSIUM 4.4 08/21/2017     Lab Results   Component Value Date    CHLORIDE 102 08/21/2017     Lab Results   Component Value Date    BUN 51 08/21/2017     Lab Results   Component Value Date    CR 1.40 08/21/2017     GFR Estimate   Date Value Ref Range Status   08/21/2017 37 (L) >60 mL/min/1.7m2 Final     Comment:     Non  GFR Calc   08/10/2017 28 (L) >60 mL/min/1.7m2 Final     Comment:     Non  GFR Calc   07/13/2017 43 (L) >60 mL/min/1.7m2 Final     Comment:     Non  GFR Calc     GFR Estimate If Black   Date Value Ref Range Status   08/21/2017 45 (L) >60 mL/min/1.7m2 Final     Comment:      GFR Calc   08/10/2017 34 (L) >60 mL/min/1.7m2 Final     Comment:      GFR Calc   07/13/2017 52 (L) >60 mL/min/1.7m2 Final     Comment:      GFR Calc     TSH   Date Value Ref Range Status   03/09/2017 1.20 0.40 - 4.00 mU/L Final   ]    Most Recent Immunizations   Administered Date(s) Administered     HepB-Peds 01/13/2003     Influenza (H1N1) 11/25/2009     Influenza (High Dose) 3 valent vaccine 10/07/2016     Influenza (IIV3) 11/05/2011     Influenza Vaccine IM 3yrs+ 4 Valent IIV4 10/16/2013     Pneumococcal (PCV 13) 12/01/2015     Pneumococcal 23 valent 06/17/2013     TDAP Vaccine (Adacel) 06/17/2013     Zoster vaccine, live 06/17/2013       ASSESSMENT:              Current medications were reviewed today as discussed above.      Medication Adherence: needs improvement - see below    S/p kidney transplant: needs improvement. Pt would benefit from increasing dose as  recommended by transplant team. Will postpone recheck labs 2 weeks. Reordered prednisone per pt request.   Diabetes: stable. A1c at goal <8%. Called  mail service for insulin pricing and awaiting call back. Will assist in transition to lower cost insulins if needed.       PLAN:                  Increase tacrolimus to 2mg BID as prescribed  Reordered prednisone to  Mail service - OK per verbal from PCP    I spent 60 minutes with this patient today  . All changes were made via collaborative practice agreement with Marivel Barcenas. A copy of the visit note was provided to the patient's primary care provider.     Will follow up in 1 month.    The patient was mailed a summary of these recommendations as an after visit summary.    Conchita Toledo, PharmD, BCACP     Addendum: call back from  mail service and cost of insulin has decreased to ~$10 for Lantus. Needs updated Rx for Novolog, sent.  pharmacy will call pt to arrange delivery.

## 2017-09-06 NOTE — MR AVS SNAPSHOT
After Visit Summary   9/6/2017    Gem Jade    MRN: [id number]           Patient Information     Date Of Birth          1945        Visit Information        Provider Department      9/6/2017 2:00 PM Conchita Toledo, [facility]        Today's Diagnoses     Type 2 diabetes mellitus with diabetic polyneuropathy, with long-term current use of insulin (H)    -  1    Kidney replaced by transplant          Care Instructions    See AVS from PCP encounter for details           Follow-ups after your visit        Your next 10 appointments already scheduled     Sep 28, 2017  2:00 PM CDT   Office Visit with Conchita Toledo [facility] ([facility])    [address] [place] [address]  [address] [place] [id number] [phone]           Bring a current list of meds and any records pertaining to this visit. For Physicals, please bring immunization records and any forms needing to be filled out. Please arrive 10 minutes early to complete paperwork.            Oct 04, 2017  2:00 PM CDT   Return Visit with ANTHONY Antonio [facility] ([facility])    [address] [place] [address]  [address] [place] [address] [phone]              Who to contact     If you have questions or need follow up information about today's clinic visit or your schedule please contact [facility] directly at [phone].  Normal or non-critical lab and imaging results will be communicated to you by MyChart, letter or phone within 4 business days after the clinic has received the results. If you do not hear from us within 7 days, please contact the clinic through MyChart or phone. If you have a critical or abnormal lab result, we will notify you by phone as soon as possible.  Submit refill requests through [employer] or call your pharmacy and  they will forward the refill request to us. Please allow 3 business days for your refill to be completed.          Additional Information About Your Visit        [facility] Information     [employer] gives you secure access to your electronic health record. If you see a primary care provider, you can also send messages to your care team and make appointments. If you have questions, please call your primary care clinic.  If you do not have a primary care provider, please call [phone] and they will assist you.        Care EveryWhere ID     This is your Care EveryWhere ID. This could be used by other organizations to access your [place] medical records  [id number]         Blood Pressure from Last 3 Encounters:   09/06/17 120/50   07/26/17 110/78   07/05/17 145/68    Weight from Last 3 Encounters:   04/27/17 199 lb 8 oz (90.5 kg)   03/15/17 201 lb (91.2 kg)   02/20/17 205 lb 4.8 oz (93.1 kg)              Today, you had the following     No orders found for display         Today's Medication Changes          These changes are accurate as of: 9/6/17  4:21 PM.  If you have any questions, ask your nurse or doctor.               These medicines have changed or have updated prescriptions.        Dose/Directions    * tacrolimus 1 MG capsule   Commonly known as:  GENERIC EQUIVALENT   This may have changed:  Another medication with the same name was removed. Continue taking this medication, and follow the directions you see here.   Used for:  Kidney transplant recipient, Long-term use of immunosuppressant medication   Changed by:  Jorgito Jacinto RN        Dose:  2 mg   Take 2 capsules (2 mg) by mouth 2 times daily   Quantity:  180 capsule   Refills:  3       * tacrolimus 0.5 MG capsule   Commonly known as:  GENERIC EQUIVALENT   This may have changed:  Another medication with the same name was removed. Continue taking this medication, and follow the directions you see here.   Used for:  Kidney transplant recipient   Changed  by:  Jorgito Jacinto, RN        HOLD   Quantity:  180 capsule   Refills:  3       TraMADol HCl 50 MG Tbdp   Indication:  Moderate to Moderately Severe Pain   This may have changed:  when to take this   Used for:  Chronic shoulder pain, unspecified laterality        Dose:  50 mg   Take 50 mg by mouth 3 times daily as needed   Quantity:  90 tablet   Refills:  3       vitamin D 2000 UNITS tablet   This may have changed:  Another medication with the same name was removed. Continue taking this medication, and follow the directions you see here.   Used for:  Vitamin deficiency   Changed by:  Marivel Barcenas APRN CNP        Dose:  2000 Units   Take 2,000 Units by mouth 2 times daily   Quantity:  100 tablet   Refills:  3       * Notice:  This list has 2 medication(s) that are the same as other medications prescribed for you. Read the directions carefully, and ask your doctor or other care provider to review them with you.         Where to get your medicines      These medications were sent to [facility] - [address]  [address] [facility], [facility] [address]    Hours:  Mon-Fri 8:30am-5:00pm Toll Free [phone] Phone:  [phone]     predniSONE 5 MG tablet                Primary Care Provider Office Phone # Fax #    ANTHONY Antonio [facility] [phone]       [address] [facility] [address]  [facility] [address]        Equal Access to Services     CARLOS ENRIQUE GEREN AH: Hadjb hurdo Sokaleigh, waaxda luqadaha, qaybta kaalmada adegini, jessica aguilar . So [facility] [place] [phone].    ATENCIÓN: Si habla español, tiene a peguero disposición servicios gratuitos de asistencia lingüística. Llame al [phone].    We comply with applicable federal civil rights laws and [place] laws. We do not discriminate on the basis of race, color, national origin, age, disability sex, sexual orientation or gender identity.            Thank  you!     Thank you for choosing [facility]  for your care. Our goal is always to provide you with excellent care. Hearing back from our patients is one way we can continue to improve our services. Please take a few minutes to complete the written survey that you may receive in the mail after your visit with us. Thank you!             Your Updated Medication List - Protect others around you: Learn how to safely use, store and throw away your medicines at www.disposemymeds.org.          This list is accurate as of: 9/6/17  4:21 PM.  Always use your most recent med list.                   Brand Name Dispense Instructions for use Diagnosis    ACCU-CHEK COMPACT PLUS test strip   Generic drug:  blood glucose monitoring     100 strip    Use to test blood sugar 3 times daily or as directed.  Ok to substitute alternative if insurance prefers.        ACETAMINOPHEN PO      Take 325-650 mg by mouth every 4 hours as needed.        alcohol swab prep pads     1 Box    Use to swab area of injection/yvon and vials as directed.    Type 2 diabetes, HbA1C goal < 8% (H)       aspirin 81 MG chewable tablet     90 tablet    Take 1 tablet (81 mg) by mouth daily Starting 1 week after your kidney biopsy    Pulmonary embolism and infarction (H), Diabetes mellitus with background retinopathy (H)       cinacalcet 30 MG tablet    SENSIPAR    60 tablet    Take 1 tablet (30 mg) by mouth daily    Kidney replaced by transplant, Hypercalcemia       cloNIDine 0.1 MG tablet    CATAPRES    60 tablet    Take 1 tablet (0.1 mg) by mouth 2 times daily    Johnson catheter in place       clotrimazole 1 % cream    LOTRIMIN    40 g    Apply topically 2 times daily Apply to buttocks twice a day until rash resolved.        Cranberry 400 MG Tabs      Take 400 mg by mouth 2 times daily        cyanocobalamin 1000 MCG/ML injection    VITAMIN B12    3 mL    Inject 1 mL (1,000 mcg) into the muscle every 30 days    Iron deficiency  anemia, unspecified iron deficiency anemia type       diphenoxylate-atropine 2.5-0.025 MG per tablet    LOMOTIL    50 tablet    Take 1 tablet by mouth 4 times daily as needed for diarrhea    Diarrhea of presumed infectious origin       famotidine 20 MG tablet    PEPCID    60 tablet    Take 1 tablet (20 mg) by mouth 2 times daily        gabapentin 300 MG capsule    NEURONTIN    180 capsule    Take 3 capsules (900 mg) by mouth 2 times daily For pain    Diabetic polyneuropathy associated with type 2 diabetes mellitus (H)       GLUCAGON EMERGENCY 1 MG kit   Generic drug:  glucagon     1 each    Inject 1 mg into the muscle once    Hypoglycemia       insulin aspart 100 UNITS/ML injection    NovoLOG VIAL    10 mL    10u with lunch and  dinner. At bedtime use following sliding scale: 150-200 5u 201-250 7u 251-300 9u 301-350 11u 351+ 13u    Diabetes mellitus with background retinopathy (H)       insulin glargine 100 UNIT/ML injection    LANTUS VIAL    10 mL    Inject 24 Units Subcutaneous At Bedtime    Diabetes mellitus with background retinopathy (H)       insulin pen needle 30G X 8 MM     100 each    Use as directed with Lantus    Type 2 diabetes mellitus with diabetic nephropathy (H)       insulin syringes (disposable) U-100 0.5 ML Misc     3 each    10 Units 3 times daily    Diabetes mellitus with background retinopathy (H)       labetalol 300 MG tablet    NORMODYNE    120 tablet    Take 1/2 tablet (150mg) BID. Hold for systolic BP less than 120.    Benign essential hypertension, Kidney replaced by transplant       lactobacillus rhamnosus (GG) capsule     60 capsule    Take 1 capsule by mouth 2 times daily    Diarrhea       levothyroxine 50 MCG tablet    SYNTHROID    90 tablet    Take 1 tablet (50 mcg) by mouth daily    Other specified hypothyroidism       lidocaine 2 % topical gel    XYLOCAINE    30 mL    Apply topically as needed for moderate pain 10 ML every 3 weeks    Johnson catheter in place       loratadine 10 MG  "tablet    CLARITIN    90 tablet    Take 1 tablet (10 mg) by mouth daily    Acute nasopharyngitis       meclizine 12.5 MG tablet    ANTIVERT    90 tablet    Take 1 tablet (12.5 mg) by mouth 3 times daily as needed for dizziness    Disorientation       NYSTOP 113090 UNIT/GM Powd   Generic drug:  nystatin     60 g    Externally apply 1 g topically 3 times daily as needed    Fungal infection       ondansetron 4 MG ODT tab    ZOFRAN ODT    20 tablet    Take 1-2 tablets (4-8 mg) by mouth every 8 hours as needed for nausea    Nausea       order for DME     3 catheter    Equipment being ordered: Johnson Catheters - 18 french    Johnson catheter status       * order for DME     100 pad    Equipment being ordered: Blue Chux Please send to [facility]    Fecal incontinence       * order for DME     1 each    Equipment being ordered: Fronto CPAP interface (nasal pillows), size XS.    Obstructive sleep apnea syndrome       * order for DME     12 each    Equipment being ordered: Silver Impregnated catheters HX of frequent UTI    Recurrent UTI       pravastatin 10 MG tablet    PRAVACHOL    90 tablet    TAKE ONE TABLET BY MOUTH EVERY DAY    Hyperlipidemia LDL goal <100       predniSONE 5 MG tablet    DELTASONE    90 tablet    Take 1 tablet (5 mg) by mouth daily    Kidney replaced by transplant       simethicone 125 MG Chew chewable tablet    MYLICON    120 tablet    Take 1 tablet (125 mg) by mouth as needed for intestinal gas        syringe/needle (disp) 22G X 1-1/2\" 3 ML Misc    B-D SYRINGE/NEEDLE    50 each    Inject 1 Syringe into the muscle every 30 days    Iron deficiency anemia, unspecified iron deficiency anemia type       * tacrolimus 1 MG capsule    GENERIC EQUIVALENT    180 capsule    Take 2 capsules (2 mg) by mouth 2 times daily    Kidney transplant recipient, Long-term use of immunosuppressant medication       * tacrolimus 0.5 MG capsule    GENERIC EQUIVALENT    180 capsule    HOLD    Kidney transplant recipient       " TraMADol HCl 50 MG Tbdp     90 tablet    Take 50 mg by mouth 3 times daily as needed    Chronic shoulder pain, unspecified laterality       TRILEPTAL 300 MG tablet   Generic drug:  OXcarbazepine     180 tablet    Take 1 tablet (300 mg) by mouth 2 times daily For pain    Diabetic polyneuropathy associated with diabetes mellitus due to underlying condition (H)       vitamin D 2000 UNITS tablet     100 tablet    Take 2,000 Units by mouth 2 times daily    Vitamin deficiency       * Notice:  This list has 5 medication(s) that are the same as other medications prescribed for you. Read the directions carefully, and ask your doctor or other care provider to review them with you.

## 2017-09-06 NOTE — PATIENT INSTRUCTIONS
We will send in these notes for a high back wheelchair    No med changes today    Please continue to work on your core strength and getting up in your wheel chair     We will see you again in one month 10/4

## 2017-09-06 NOTE — Clinical Note
DME ordered for high back wheel chair, please print note and send in with OT notes,  I will sign them on Friday 9/15

## 2017-09-08 NOTE — TELEPHONE ENCOUNTER
Placed forms in Klaudia's box along with note requesting that forms be sent along with F2F to [facility].    F2F was completed, but note is not done yet.  Brenna Irvin OT called to check on these forms today and writer advised her of above.  She requests a call when everything is sent.    Her number is [phone]

## 2017-09-19 ENCOUNTER — TELEPHONE (OUTPATIENT)
Dept: FAMILY MEDICINE | Facility: CLINIC | Age: 72
End: 2017-09-19

## 2017-09-19 NOTE — TELEPHONE ENCOUNTER
Received an email from Mary at [facility] that the labs scheduled for 9/18 were not done.  The phlebotomist said he went to pt's home and her  told her the labs were cancelled.

## 2017-09-20 NOTE — TELEPHONE ENCOUNTER
Called pt's  and he said he forgot to give pt her Prograf on time, so he cancelled the labs with Mukesh over the phone.  Keshawn said he did not get ahold of Shabbir Jacinto RN from Transplant and he did not know he needed to call [facility].  I asked if they could let us know in the future and he agreed.  Per Keshawn, any day would be fine to reschedule pt's standing labs.    Emailed [facility] with an update and asked them to reschedule labs.    Madeline Flores RN

## 2017-09-21 DIAGNOSIS — D84.9 IMMUNOSUPPRESSED STATUS (H): ICD-10-CM

## 2017-09-21 DIAGNOSIS — Z79.60 LONG-TERM USE OF IMMUNOSUPPRESSANT MEDICATION: ICD-10-CM

## 2017-09-21 DIAGNOSIS — Z94.0 KIDNEY TRANSPLANT RECIPIENT: ICD-10-CM

## 2017-09-21 LAB
ERYTHROCYTE [DISTWIDTH] IN BLOOD BY AUTOMATED COUNT: 14.2 % (ref 10–15)
HCT VFR BLD AUTO: 32.9 % (ref 35–47)
HGB BLD-MCNC: 10.6 G/DL (ref 11.7–15.7)
MCH RBC QN AUTO: 31.3 PG (ref 26.5–33)
MCHC RBC AUTO-ENTMCNC: 32.2 G/DL (ref 31.5–36.5)
MCV RBC AUTO: 97 FL (ref 78–100)
PLATELET # BLD AUTO: 188 10E9/L (ref 150–450)
RBC # BLD AUTO: 3.39 10E12/L (ref 3.8–5.2)
WBC # BLD AUTO: 6.6 10E9/L (ref 4–11)

## 2017-09-21 PROCEDURE — 80197 ASSAY OF TACROLIMUS: CPT | Performed by: INTERNAL MEDICINE

## 2017-09-21 PROCEDURE — 36415 COLL VENOUS BLD VENIPUNCTURE: CPT | Performed by: INTERNAL MEDICINE

## 2017-09-21 PROCEDURE — 85027 COMPLETE CBC AUTOMATED: CPT | Performed by: INTERNAL MEDICINE

## 2017-09-21 PROCEDURE — 80048 BASIC METABOLIC PNL TOTAL CA: CPT | Performed by: INTERNAL MEDICINE

## 2017-09-22 DIAGNOSIS — Z94.0 KIDNEY REPLACED BY TRANSPLANT: Primary | ICD-10-CM

## 2017-09-22 DIAGNOSIS — Z94.0 KIDNEY TRANSPLANT RECIPIENT: ICD-10-CM

## 2017-09-22 DIAGNOSIS — Z79.60 LONG-TERM USE OF IMMUNOSUPPRESSANT MEDICATION: ICD-10-CM

## 2017-09-22 LAB
ANION GAP SERPL CALCULATED.3IONS-SCNC: 11 MMOL/L (ref 3–14)
BUN SERPL-MCNC: 53 MG/DL (ref 7–30)
CALCIUM SERPL-MCNC: 7.9 MG/DL (ref 8.5–10.1)
CHLORIDE SERPL-SCNC: 97 MMOL/L (ref 94–109)
CO2 SERPL-SCNC: 26 MMOL/L (ref 20–32)
CREAT SERPL-MCNC: 1.86 MG/DL (ref 0.52–1.04)
GFR SERPL CREATININE-BSD FRML MDRD: 27 ML/MIN/1.7M2
GLUCOSE SERPL-MCNC: 261 MG/DL (ref 70–99)
POTASSIUM SERPL-SCNC: 4.3 MMOL/L (ref 3.4–5.3)
SODIUM SERPL-SCNC: 134 MMOL/L (ref 133–144)
TACROLIMUS BLD-MCNC: <3 UG/L (ref 5–15)
TME LAST DOSE: ABNORMAL H

## 2017-09-22 RX ORDER — TACROLIMUS 1 MG/1
3 CAPSULE ORAL 2 TIMES DAILY
Qty: 180 CAPSULE | Refills: 3 | Status: SHIPPED | OUTPATIENT
Start: 2017-09-22 | End: 2017-10-18

## 2017-09-22 NOTE — LETTER
PHYSICIAN ORDERS      DATE & TIME ISSUED: 2017 12:17 PM  PATIENT NAME: Gem Jade   : 1945     [facility] MR# [if applicable]: [id number]     DIAGNOSIS:  Kidney tranplant  ICD-9 CODE: Z94.0      Attention Lucy Smith RN:     Please see patient on Monday for a noon 12 hour Tacrolimus trough and BMP Level. Patient tacrolimus dose increased to 3 mg twice a day.     Any questions please call: [phone]    Please fax these results to [phone].    .

## 2017-09-22 NOTE — TELEPHONE ENCOUNTER
ISSUE:  Tacrolimus level < 3 x 3, s/b 3-5, dose 2 mg BID  Creatinine level elevated 1.8 (baseline following hospitalization for ROB in setting of dehydration and UTI 1.3-1.5)      PLAN:   Call to pt , very long conversation regarding overall plan of care. States he was not made aware of the previous dose increase in August 1.5>2, however pt has been taking 2 mg BID since at least the beginning of September.   States pt has been drinking >3L/day, hx of immobility, chronic urinary catheter.  increase tacrolimus dose to 3 mg BID - recheck level Monday with BMP

## 2017-09-28 ENCOUNTER — OFFICE VISIT (OUTPATIENT)
Dept: PHARMACY | Facility: CLINIC | Age: 72
End: 2017-09-28
Payer: COMMERCIAL

## 2017-09-28 VITALS — HEART RATE: 60 BPM | DIASTOLIC BLOOD PRESSURE: 78 MMHG | OXYGEN SATURATION: 99 % | SYSTOLIC BLOOD PRESSURE: 132 MMHG

## 2017-09-28 DIAGNOSIS — E56.9 VITAMIN DEFICIENCY: ICD-10-CM

## 2017-09-28 DIAGNOSIS — Z94.0 KIDNEY REPLACED BY TRANSPLANT: Primary | ICD-10-CM

## 2017-09-28 DIAGNOSIS — E11.42 TYPE 2 DIABETES MELLITUS WITH DIABETIC POLYNEUROPATHY, WITH LONG-TERM CURRENT USE OF INSULIN (H): ICD-10-CM

## 2017-09-28 DIAGNOSIS — I10 BENIGN ESSENTIAL HYPERTENSION: ICD-10-CM

## 2017-09-28 DIAGNOSIS — Z79.4 TYPE 2 DIABETES MELLITUS WITH DIABETIC POLYNEUROPATHY, WITH LONG-TERM CURRENT USE OF INSULIN (H): ICD-10-CM

## 2017-09-28 PROCEDURE — 99607 MTMS BY PHARM ADDL 15 MIN: CPT | Performed by: PHARMACIST

## 2017-09-28 PROCEDURE — 99606 MTMS BY PHARM EST 15 MIN: CPT | Performed by: PHARMACIST

## 2017-09-28 RX ORDER — CHOLECALCIFEROL (VITAMIN D3) 50 MCG
4000 TABLET ORAL 2 TIMES DAILY
Qty: 100 TABLET | Refills: 3 | COMMUNITY
Start: 2017-09-28 | End: 2018-01-08

## 2017-09-28 NOTE — PROGRESS NOTES
"SUBJECTIVE/OBJECTIVE:                Gem Jade is a 71 year old female seen at their home for a follow-up visit for Medication Therapy Management.  She was referred to me from Marivel Barcenas.     Chief Complaint: Follow up from our visit on 9/6/17.    Tobacco: History of tobacco dependence - quit   Alcohol: not currently using    Medication Adherence: no issues found and has assistance setting up med boxes (Keshawn). Reviewed pill bottles and med box in detail today.     S/p kidney transplant: currently taking tacrolimus 3mg BID and prednisone 5mg daily. Labs are ordered for recheck but hadn't been done on Monday. No side effects noted.     Diabetes:  Pt currently taking Lantus 24 units daily, Novolog 10 units with meals BID and uses sliding scale 2 units for every 50 over 201.   SMBG:  One to two times daily.   Ranges (glucometer)   Date FBG/ 2hours post Lunch/2hours post Dinner /2hours post    9/28 132      9/27 154         62,98     99  191       128       210       296       Symptoms of low blood sugar? \"life is leaving me\". Frequency of hypoglycemia? Rare, once recently was the first in many years  Recent symptoms of high blood sugar? None. Had a couple high BS in 300s but thinks caused by missing a shot.   Pt is not taking an ACEi/ARB. (Hx worsening renal function when on ACE/ARB therapy)  Aspirin: taking aspirin 81mg daily without problems  Diet/exercise: has been eating 2 times a day, appetite is normal. Has been staying up late to watch movies and schedule is off.     Hypertension: Current medications include labetalol 150mg BID and clonidine 0.1mg BID.  Patient does not self-monitor BP.  Patient reports no current medication side effects.      Current labs include:  BP Readings from Last 3 Encounters:   09/06/17 120/50   07/26/17 110/78   07/05/17 145/68     Today's Vitals: /78  Pulse 60  SpO2 99%  Lab Results   Component Value Date    A1C 5.6 07/13/2017   .  Lab Results   Component " Value Date    CHOL 164 12/05/2016     Lab Results   Component Value Date    TRIG 272 12/05/2016     Lab Results   Component Value Date    HDL 46 12/05/2016     Lab Results   Component Value Date    LDL 64 12/05/2016       Liver Function Studies -   Recent Labs   Lab Test  04/26/17   0638   PROTTOTAL  7.3   ALBUMIN  3.0*   BILITOTAL  0.6   ALKPHOS  72   AST  20   ALT  23       Lab Results   Component Value Date    UCRR 27 04/26/2017    MICROL 1850 04/26/2017    UMALCR 6776.56 (H) 04/26/2017       Last Basic Metabolic Panel:  Lab Results   Component Value Date     09/21/2017      Lab Results   Component Value Date    POTASSIUM 4.3 09/21/2017     Lab Results   Component Value Date    CHLORIDE 97 09/21/2017     Lab Results   Component Value Date    BUN 53 09/21/2017     Lab Results   Component Value Date    CR 1.86 09/21/2017     GFR Estimate   Date Value Ref Range Status   09/21/2017 27 (L) >60 mL/min/1.7m2 Final     Comment:     Non  GFR Calc   08/21/2017 37 (L) >60 mL/min/1.7m2 Final     Comment:     Non  GFR Calc   08/10/2017 28 (L) >60 mL/min/1.7m2 Final     Comment:     Non  GFR Calc     GFR Estimate If Black   Date Value Ref Range Status   09/21/2017 32 (L) >60 mL/min/1.7m2 Final     Comment:      GFR Calc   08/21/2017 45 (L) >60 mL/min/1.7m2 Final     Comment:      GFR Calc   08/10/2017 34 (L) >60 mL/min/1.7m2 Final     Comment:      GFR Calc     TSH   Date Value Ref Range Status   03/09/2017 1.20 0.40 - 4.00 mU/L Final   ]    Most Recent Immunizations   Administered Date(s) Administered     HepB 01/13/2003     Influenza (H1N1) 11/25/2009     Influenza (High Dose) 3 valent vaccine 10/07/2016     Influenza (IIV3) 11/05/2011     Influenza Vaccine IM 3yrs+ 4 Valent IIV4 10/16/2013     Pneumococcal (PCV 13) 12/01/2015     Pneumococcal 23 valent 06/17/2013     TDAP Vaccine (Adacel) 06/17/2013     Zoster vaccine, live  06/17/2013       ASSESSMENT:              Current medications were reviewed today as discussed above.      Medication Adherence: no issues identified    S/p kidney transplant: needs improvement. Last Tac level low; will forward message to staff to coordinate with phlebotomist for lab draw.  Diabetes: stable. A1c at goal <8%.   Hypertension: stable. BP at goal <140/90.      PLAN:                Recheck Tac and BMP per transplant clinic - sent message to RN/MA to coordinate lab draw    I spent 60 minutes with this patient today  . All changes were made via collaborative practice agreement with Marivel Barcenas. A copy of the visit note was provided to the patient's primary care provider.     Will follow up in 1 month.    The patient was sent via [employer] a summary of these recommendations as an after visit summary.    Conchita Toledo, PharmD, BCACP

## 2017-09-28 NOTE — Clinical Note
RN/MA please call client services(pt prefers Mukesh) to have labs completed. Were supposed to be done Monday but looks like they didn't schedule correctly.

## 2017-09-28 NOTE — MR AVS SNAPSHOT
After Visit Summary   9/28/2017    Gem Jade    MRN: [id number]           Patient Information     Date Of Birth          1945        Visit Information        Provider Department      9/28/2017 2:00 PM Conchita Toledo, [facility]        Today's Diagnoses     Kidney replaced by transplant    -  1    Vitamin deficiency        Type 2 diabetes mellitus with diabetic polyneuropathy, with long-term current use of insulin (H)        Benign essential hypertension          Care Instructions    Recommendations from today's MTM visit:                                                        1. I will work with the clinic staff on making sure you get somebody to draw your blood for your kidney labs    Next MTM visit: 1 month    To schedule another MTM appointment, please call the clinic directly or you may call the MTM scheduling line at [phone] or toll-free at [phone].     My Clinical Pharmacist's contact information:                                                      It was a pleasure seeing you today!  Please feel free to contact me with any questions or concerns you have.      Conchita Toledo, PharmD, BCACP     You may receive a survey about the MT services you received.  I would appreciate your feedback to help me serve you better in the future. Please fill it out and return it when you can. Your comments will be anonymous.                Follow-ups after your visit        Your next 10 appointments already scheduled     Oct 18, 2017  2:00 PM CDT   Office Visit with Conchita Toledo, [facility] ([facility])    [address] [place] [id number] [phone]           Bring a current list of meds and any records pertaining to this visit. For Physicals, please bring immunization records and any forms needing to be filled out. Please arrive 10 minutes early  to complete paperwork.              Who to contact     If you have questions or need follow up information about today's clinic visit or your schedule please contact [facility] directly at [phone].  Normal or non-critical lab and imaging results will be communicated to you by MyChart, letter or phone within 4 business days after the clinic has received the results. If you do not hear from us within 7 days, please contact the clinic through Christ[employer] or phone. If you have a critical or abnormal lab result, we will notify you by phone as soon as possible.  Submit refill requests through InVivioLink or call your pharmacy and they will forward the refill request to us. Please allow 3 business days for your refill to be completed.          Additional Information About Your Visit        Christtube LLChart Information     In[employer] gives you secure access to your electronic health record. If you see a primary care provider, you can also send messages to your care team and make appointments. If you have questions, please call your primary care clinic.  If you do not have a primary care provider, please call [phone] and they will assist you.        Care EveryWhere ID     This is your Care EveryWhere ID. This could be used by other organizations to access your [place] medical records  [id number]        Your Vitals Were     Pulse Pulse Oximetry                60 99%           Blood Pressure from Last 3 Encounters:   10/04/17 135/70   09/28/17 132/78   09/06/17 120/50    Weight from Last 3 Encounters:   04/27/17 199 lb 8 oz (90.5 kg)   03/15/17 201 lb (91.2 kg)   02/20/17 205 lb 4.8 oz (93.1 kg)              Today, you had the following     No orders found for display         Today's Medication Changes          These changes are accurate as of: 9/28/17 11:59 PM.  If you have any questions, ask your nurse or doctor.               These medicines have changed or have updated prescriptions.         Dose/Directions    TraMADol HCl 50 MG Tbdp   Indication:  Moderate to Moderately Severe Pain   This may have changed:  when to take this   Used for:  Chronic shoulder pain, unspecified laterality        Dose:  50 mg   Take 50 mg by mouth 3 times daily as needed   Quantity:  90 tablet   Refills:  3       vitamin D 2000 UNITS tablet   This may have changed:  how much to take   Used for:  Vitamin deficiency        Dose:  4000 Units   Take 4,000 Units by mouth 2 times daily   Quantity:  100 tablet   Refills:  3                Primary Care Provider Office Phone # Fax #    Marivel Salgado TommyangelikaElton, APRN [facility] [phone]       [address] [facility] [address]  [facility] [place] [address]        Equal Access to Services     CARLOS ENRIQUE GREEN : Hadii jairo hurdo Maxine, waaxda luqadaha, qaybta kaalmada jorgeda, jessica aguilar . So [place] [phone].    ATENCIÓN: Si habla español, tiene a peguero disposición servicios gratuitos de asistencia lingüística. Llame al [phone].    We comply with applicable federal civil rights laws and [place] laws. We do not discriminate on the basis of race, color, national origin, age, disability, sex, sexual orientation, or gender identity.            Thank you!     Thank you for choosing [facility]  for your care. Our goal is always to provide you with excellent care. Hearing back from our patients is one way we can continue to improve our services. Please take a few minutes to complete the written survey that you may receive in the mail after your visit with us. Thank you!             Your Updated Medication List - Protect others around you: Learn how to safely use, store and throw away your medicines at www.disposemymeds.org.          This list is accurate as of: 9/28/17 11:59 PM.  Always use your most recent med list.                   Brand Name Dispense Instructions for use Diagnosis    ACCU-CHEK COMPACT PLUS test strip    Generic drug:  blood glucose monitoring     100 strip    Use to test blood sugar 3 times daily or as directed.  Ok to substitute alternative if insurance prefers.        ACETAMINOPHEN PO      Take 325-650 mg by mouth every 4 hours as needed.        aspirin 81 MG chewable tablet     90 tablet    Take 1 tablet (81 mg) by mouth daily Starting 1 week after your kidney biopsy    Pulmonary embolism and infarction (H), Diabetes mellitus with background retinopathy (H)       cinacalcet 30 MG tablet    SENSIPAR    60 tablet    Take 1 tablet (30 mg) by mouth daily    Kidney replaced by transplant, Hypercalcemia       * cloNIDine 0.1 MG tablet    CATAPRES    60 tablet    Take 1 tablet (0.1 mg) by mouth 2 times daily    Johnson catheter in place       * cloNIDine 0.1 MG tablet    CATAPRES    180 tablet    TAKE ONE TABLET BY MOUTH TWICE A DAY    HTN, goal below 140/90       Cranberry 400 MG Tabs      Take 400 mg by mouth 2 times daily        cyanocobalamin 1000 MCG/ML injection    VITAMIN B12    3 mL    Inject 1 mL (1,000 mcg) into the muscle every 30 days    Iron deficiency anemia, unspecified iron deficiency anemia type       diphenoxylate-atropine 2.5-0.025 MG per tablet    LOMOTIL    50 tablet    Take 1 tablet by mouth 4 times daily as needed for diarrhea    Diarrhea of presumed infectious origin       famotidine 20 MG tablet    PEPCID    60 tablet    Take 1 tablet (20 mg) by mouth 2 times daily        gabapentin 300 MG capsule    NEURONTIN    180 capsule    Take 3 capsules (900 mg) by mouth 2 times daily For pain    Diabetic polyneuropathy associated with type 2 diabetes mellitus (H)       GLUCAGON EMERGENCY 1 MG kit   Generic drug:  glucagon     1 each    Inject 1 mg into the muscle once    Hypoglycemia       insulin aspart 100 UNIT/ML injection    NovoLOG FLEXPEN    15 mL    Inject 10 units under the skin 2 times daily before meals as directed. Add sliding scale as needed. Avg use 25 units per day    Type 2 diabetes  mellitus with diabetic polyneuropathy, with long-term current use of insulin (H)       insulin glargine 100 UNIT/ML injection    LANTUS SOLOSTAR    15 mL    Inject 24 units under the skin daily.    Type 2 diabetes mellitus with diabetic polyneuropathy, with long-term current use of insulin (H)       insulin pen needle 30G X 8 MM     100 each    Use as directed with Lantus    Type 2 diabetes mellitus with diabetic nephropathy (H)       labetalol 300 MG tablet    NORMODYNE    120 tablet    Take 1/2 tablet (150mg) BID. Hold for systolic BP less than 120.    Benign essential hypertension, Kidney replaced by transplant       lactobacillus rhamnosus (GG) capsule     60 capsule    Take 1 capsule by mouth 2 times daily    Diarrhea       levothyroxine 50 MCG tablet    SYNTHROID    90 tablet    Take 1 tablet (50 mcg) by mouth daily    Other specified hypothyroidism       lidocaine 2 % topical gel    XYLOCAINE    30 mL    Apply topically as needed for moderate pain 10 ML every 3 weeks    Johnson catheter in place       loratadine 10 MG tablet    CLARITIN    90 tablet    Take 1 tablet (10 mg) by mouth daily    Acute nasopharyngitis       meclizine 12.5 MG tablet    ANTIVERT    90 tablet    Take 1 tablet (12.5 mg) by mouth 3 times daily as needed for dizziness    Disorientation       ondansetron 4 MG ODT tab    ZOFRAN ODT    20 tablet    Take 1-2 tablets (4-8 mg) by mouth every 8 hours as needed for nausea    Nausea       order for DME     3 catheter    Equipment being ordered: Johnson Catheters - 18 french    Johnson catheter status       * order for DME     100 pad    Equipment being ordered: Blue Chux Please send to [facility]    Fecal incontinence       * order for DME     1 each    Equipment being ordered: [employer] FX CPAP interface (nasal pillows), size XS.    Obstructive sleep apnea syndrome       * order for DME     12 each    Equipment being ordered: Silver Impregnated catheters HX of frequent UTI    Recurrent UTI       *  order for DME     1 Device    High back guerrero wheel chair MICHELLE 99 DX: morbid obesity, generalized weakness, physical deconditioning, chronic vertigo    Morbid obesity, unspecified obesity type (H), Vertigo, Generalized muscle weakness, Physical deconditioning       pravastatin 10 MG tablet    PRAVACHOL    90 tablet    TAKE ONE TABLET BY MOUTH EVERY DAY    Hyperlipidemia LDL goal <100       predniSONE 5 MG tablet    DELTASONE    90 tablet    Take 1 tablet (5 mg) by mouth daily    Kidney replaced by transplant       simethicone 125 MG Chew chewable tablet    MYLICON    120 tablet    Take 1 tablet (125 mg) by mouth as needed for intestinal gas        * tacrolimus 0.5 MG capsule    GENERIC EQUIVALENT    180 capsule    HOLD    Kidney transplant recipient       * tacrolimus 1 MG capsule    GENERIC EQUIVALENT    180 capsule    Take 3 capsules (3 mg) by mouth 2 times daily    Kidney transplant recipient, Long-term use of immunosuppressant medication       TraMADol HCl 50 MG Tbdp     90 tablet    Take 50 mg by mouth 3 times daily as needed    Chronic shoulder pain, unspecified laterality       TRILEPTAL 300 MG tablet   Generic drug:  OXcarbazepine     180 tablet    Take 1 tablet (300 mg) by mouth 2 times daily For pain    Diabetic polyneuropathy associated with diabetes mellitus due to underlying condition (H)       vitamin D 2000 UNITS tablet     100 tablet    Take 4,000 Units by mouth 2 times daily    Vitamin deficiency       * Notice:  This list has 8 medication(s) that are the same as other medications prescribed for you. Read the directions carefully, and ask your doctor or other care provider to review them with you.

## 2017-09-29 ENCOUNTER — TELEPHONE (OUTPATIENT)
Dept: FAMILY MEDICINE | Facility: CLINIC | Age: 72
End: 2017-09-29

## 2017-09-29 NOTE — TELEPHONE ENCOUNTER
Received message from [facility] requesting labs to be scheduled with [facility].  She states labs were supposed to be completed this past Monday but they did not get completed.    Noted in last encounter regarding labs, LPN had left message for homecare nurse to draw labs.  This patient is only drawn by [facility].  Sent message to [facility] to draw labs as soon as able.    Wendie Harris RN

## 2017-10-02 DIAGNOSIS — Z94.0 KIDNEY REPLACED BY TRANSPLANT: ICD-10-CM

## 2017-10-02 PROCEDURE — 85027 COMPLETE CBC AUTOMATED: CPT | Performed by: INTERNAL MEDICINE

## 2017-10-02 PROCEDURE — 80197 ASSAY OF TACROLIMUS: CPT | Performed by: INTERNAL MEDICINE

## 2017-10-02 PROCEDURE — 80048 BASIC METABOLIC PNL TOTAL CA: CPT | Performed by: INTERNAL MEDICINE

## 2017-10-02 PROCEDURE — 36415 COLL VENOUS BLD VENIPUNCTURE: CPT | Performed by: INTERNAL MEDICINE

## 2017-10-03 ENCOUNTER — TELEPHONE (OUTPATIENT)
Dept: FAMILY MEDICINE | Facility: CLINIC | Age: 72
End: 2017-10-03

## 2017-10-03 LAB
ANION GAP SERPL CALCULATED.3IONS-SCNC: 9 MMOL/L (ref 3–14)
BUN SERPL-MCNC: 52 MG/DL (ref 7–30)
CALCIUM SERPL-MCNC: 8.2 MG/DL (ref 8.5–10.1)
CHLORIDE SERPL-SCNC: 106 MMOL/L (ref 94–109)
CO2 SERPL-SCNC: 24 MMOL/L (ref 20–32)
CREAT SERPL-MCNC: 1.16 MG/DL (ref 0.52–1.04)
ERYTHROCYTE [DISTWIDTH] IN BLOOD BY AUTOMATED COUNT: 14.7 % (ref 10–15)
GFR SERPL CREATININE-BSD FRML MDRD: 46 ML/MIN/1.7M2
GLUCOSE SERPL-MCNC: 164 MG/DL (ref 70–99)
HCT VFR BLD AUTO: 34.2 % (ref 35–47)
HGB BLD-MCNC: 10.8 G/DL (ref 11.7–15.7)
MCH RBC QN AUTO: 30.5 PG (ref 26.5–33)
MCHC RBC AUTO-ENTMCNC: 31.6 G/DL (ref 31.5–36.5)
MCV RBC AUTO: 97 FL (ref 78–100)
PLATELET # BLD AUTO: 175 10E9/L (ref 150–450)
POTASSIUM SERPL-SCNC: 4.6 MMOL/L (ref 3.4–5.3)
RBC # BLD AUTO: 3.54 10E12/L (ref 3.8–5.2)
SODIUM SERPL-SCNC: 139 MMOL/L (ref 133–144)
TACROLIMUS BLD-MCNC: 4.4 UG/L (ref 5–15)
TME LAST DOSE: ABNORMAL H
WBC # BLD AUTO: 6.2 10E9/L (ref 4–11)

## 2017-10-03 NOTE — TELEPHONE ENCOUNTER
Brenna with [facility] requesting orders. Orders for OT 2m1 education and transfers.     Brenna can be reached with verbal.

## 2017-10-03 NOTE — TELEPHONE ENCOUNTER
Sent message to STEPH Ce[facility] giving verbal ok on order requested per RN protocol.    Wendie Harris RN

## 2017-10-04 ENCOUNTER — OFFICE VISIT (OUTPATIENT)
Dept: FAMILY MEDICINE | Facility: CLINIC | Age: 72
End: 2017-10-04
Payer: COMMERCIAL

## 2017-10-04 VITALS
HEART RATE: 64 BPM | OXYGEN SATURATION: 99 % | DIASTOLIC BLOOD PRESSURE: 70 MMHG | RESPIRATION RATE: 16 BRPM | SYSTOLIC BLOOD PRESSURE: 135 MMHG | TEMPERATURE: 98 F

## 2017-10-04 DIAGNOSIS — M25.519 CHRONIC SHOULDER PAIN, UNSPECIFIED LATERALITY: ICD-10-CM

## 2017-10-04 DIAGNOSIS — E11.3299 DIABETES MELLITUS WITH BACKGROUND RETINOPATHY (H): ICD-10-CM

## 2017-10-04 DIAGNOSIS — E66.01 MORBID OBESITY, UNSPECIFIED OBESITY TYPE (H): ICD-10-CM

## 2017-10-04 DIAGNOSIS — Z94.0 KIDNEY REPLACED BY TRANSPLANT: Primary | ICD-10-CM

## 2017-10-04 DIAGNOSIS — G89.29 CHRONIC SHOULDER PAIN, UNSPECIFIED LATERALITY: ICD-10-CM

## 2017-10-04 DIAGNOSIS — I10 BENIGN ESSENTIAL HYPERTENSION: ICD-10-CM

## 2017-10-04 PROCEDURE — 99350 HOME/RES VST EST HIGH MDM 60: CPT | Performed by: NURSE PRACTITIONER

## 2017-10-04 NOTE — MR AVS SNAPSHOT
After Visit Summary   10/4/2017    Gem Jade    MRN: [id number]           Patient Information     Date Of Birth          1945        Visit Information        Provider Department      10/4/2017 2:00 PM Marivel Barcenas APRN CNP [facility]        Today's Diagnoses     Kidney replaced by transplant    -  1    Benign essential hypertension        Morbid obesity, unspecified obesity type (H)        Chronic shoulder pain, unspecified laterality        Diabetes mellitus with background retinopathy (H)          Care Instructions    Today's visit:    We will see you in one month    Please work on your diet and exercise    Please try and get up in your chair            Follow-ups after your visit        Follow-up notes from your care team     Return in about 4 weeks (around 11/1/2017).      Your next 10 appointments already scheduled     Oct 18, 2017  2:00 PM CDT   Office Visit with Cocnhita Toledo, [facility] ([facility])    [address] [place] [id number] [phone]           Bring a current list of meds and any records pertaining to this visit. For Physicals, please bring immunization records and any forms needing to be filled out. Please arrive 10 minutes early to complete paperwork.              Who to contact     If you have questions or need follow up information about today's clinic visit or your schedule please contact [facility] directly at [phone].  Normal or non-critical lab and imaging results will be communicated to you by MyChart, letter or phone within 4 business days after the clinic has received the results. If you do not hear from us within 7 days, please contact the clinic through MyChart or phone. If you have a critical or abnormal lab result, we will notify you by phone as soon as possible.  Submit refill requests through Floopt or  call your pharmacy and they will forward the refill request to us. Please allow 3 business days for your refill to be completed.          Additional Information About Your Visit        Graphite Software[employer] Information     [employer] gives you secure access to your electronic health record. If you see a primary care provider, you can also send messages to your care team and make appointments. If you have questions, please call your primary care clinic.  If you do not have a primary care provider, please call [phone] and they will assist you.        Care EveryWhere ID     This is your Care EveryWhere ID. This could be used by other organizations to access your [place] medical records  [id number]        Your Vitals Were     Pulse Temperature Respirations Pulse Oximetry          64 98  F (36.7  C) (Oral) 16 99%         Blood Pressure from Last 3 Encounters:   10/04/17 135/70   09/28/17 132/78   09/06/17 120/50    Weight from Last 3 Encounters:   04/27/17 199 lb 8 oz (90.5 kg)   03/15/17 201 lb (91.2 kg)   02/20/17 205 lb 4.8 oz (93.1 kg)              Today, you had the following     No orders found for display         Today's Medication Changes          These changes are accurate as of: 10/4/17 11:59 PM.  If you have any questions, ask your nurse or doctor.               These medicines have changed or have updated prescriptions.        Dose/Directions    TraMADol HCl 50 MG Tbdp   Indication:  Moderate to Moderately Severe Pain   This may have changed:  when to take this   Used for:  Chronic shoulder pain, unspecified laterality        Dose:  50 mg   Take 50 mg by mouth 3 times daily as needed   Quantity:  90 tablet   Refills:  3                Primary Care Provider Office Phone # Fax #    ANTHONY Antonio [facility] [phone]       [address] [facility] [address]  [facility] [address]        Equal Access to Services     CARLOS ENRIQUE GREEN AH: madhav Powell qaybta kaalmada adeegyada,  jessica carrion emili mcnair'aan ah. So [facility] [phone].    ATENCIÓN: Si halliela gamaliel, tiene a peguero disposición servicios gratuitos de asistencia lingüística. Carmine gambino [phone].    We comply with applicable federal civil rights laws and [place] laws. We do not discriminate on the basis of race, color, national origin, age, disability, sex, sexual orientation, or gender identity.            Thank you!     Thank you for choosing [facility]  for your care. Our goal is always to provide you with excellent care. Hearing back from our patients is one way we can continue to improve our services. Please take a few minutes to complete the written survey that you may receive in the mail after your visit with us. Thank you!             Your Updated Medication List - Protect others around you: Learn how to safely use, store and throw away your medicines at www.disposemymeds.org.          This list is accurate as of: 10/4/17 11:59 PM.  Always use your most recent med list.                   Brand Name Dispense Instructions for use Diagnosis    ACCU-CHEK COMPACT PLUS test strip   Generic drug:  blood glucose monitoring     100 strip    Use to test blood sugar 3 times daily or as directed.  Ok to substitute alternative if insurance prefers.        ACETAMINOPHEN PO      Take 325-650 mg by mouth every 4 hours as needed.        aspirin 81 MG chewable tablet     90 tablet    Take 1 tablet (81 mg) by mouth daily Starting 1 week after your kidney biopsy    Pulmonary embolism and infarction (H), Diabetes mellitus with background retinopathy (H)       cinacalcet 30 MG tablet    SENSIPAR    60 tablet    Take 1 tablet (30 mg) by mouth daily    Kidney replaced by transplant, Hypercalcemia       * cloNIDine 0.1 MG tablet    CATAPRES    60 tablet    Take 1 tablet (0.1 mg) by mouth 2 times daily    Johnson catheter in place       * cloNIDine 0.1 MG tablet    CATAPRES    180 tablet    TAKE ONE TABLET BY MOUTH TWICE A  DAY    HTN, goal below 140/90       Cranberry 400 MG Tabs      Take 400 mg by mouth 2 times daily        cyanocobalamin 1000 MCG/ML injection    VITAMIN B12    3 mL    Inject 1 mL (1,000 mcg) into the muscle every 30 days    Iron deficiency anemia, unspecified iron deficiency anemia type       diphenoxylate-atropine 2.5-0.025 MG per tablet    LOMOTIL    50 tablet    Take 1 tablet by mouth 4 times daily as needed for diarrhea    Diarrhea of presumed infectious origin       famotidine 20 MG tablet    PEPCID    60 tablet    Take 1 tablet (20 mg) by mouth 2 times daily        gabapentin 300 MG capsule    NEURONTIN    180 capsule    Take 3 capsules (900 mg) by mouth 2 times daily For pain    Diabetic polyneuropathy associated with type 2 diabetes mellitus (H)       GLUCAGON EMERGENCY 1 MG kit   Generic drug:  glucagon     1 each    Inject 1 mg into the muscle once    Hypoglycemia       insulin aspart 100 UNIT/ML injection    NovoLOG FLEXPEN    15 mL    Inject 10 units under the skin 2 times daily before meals as directed. Add sliding scale as needed. Avg use 25 units per day    Type 2 diabetes mellitus with diabetic polyneuropathy, with long-term current use of insulin (H)       insulin glargine 100 UNIT/ML injection    LANTUS SOLOSTAR    15 mL    Inject 24 units under the skin daily.    Type 2 diabetes mellitus with diabetic polyneuropathy, with long-term current use of insulin (H)       insulin pen needle 30G X 8 MM     100 each    Use as directed with Lantus    Type 2 diabetes mellitus with diabetic nephropathy (H)       labetalol 300 MG tablet    NORMODYNE    120 tablet    Take 1/2 tablet (150mg) BID. Hold for systolic BP less than 120.    Benign essential hypertension, Kidney replaced by transplant       lactobacillus rhamnosus (GG) capsule     60 capsule    Take 1 capsule by mouth 2 times daily    Diarrhea       levothyroxine 50 MCG tablet    SYNTHROID    90 tablet    Take 1 tablet (50 mcg) by mouth daily    Other  specified hypothyroidism       lidocaine 2 % topical gel    XYLOCAINE    30 mL    Apply topically as needed for moderate pain 10 ML every 3 weeks    Johnson catheter in place       loratadine 10 MG tablet    CLARITIN    90 tablet    Take 1 tablet (10 mg) by mouth daily    Acute nasopharyngitis       meclizine 12.5 MG tablet    ANTIVERT    90 tablet    Take 1 tablet (12.5 mg) by mouth 3 times daily as needed for dizziness    Disorientation       ondansetron 4 MG ODT tab    ZOFRAN ODT    20 tablet    Take 1-2 tablets (4-8 mg) by mouth every 8 hours as needed for nausea    Nausea       order for DME     3 catheter    Equipment being ordered: Johnson Catheters - 18 french    Johnson catheter status       * order for DME     100 pad    Equipment being ordered: Blue Chux Please send to [facility]    Fecal incontinence       * order for DME     1 each    Equipment being ordered: [employer] FX CPAP interface (nasal pillows), size XS.    Obstructive sleep apnea syndrome       * order for DME     12 each    Equipment being ordered: Silver Impregnated catheters HX of frequent UTI    Recurrent UTI       * order for DME     1 Device    High back guerrero wheel chair MICHELLE 99 DX: morbid obesity, generalized weakness, physical deconditioning, chronic vertigo    Morbid obesity, unspecified obesity type (H), Vertigo, Generalized muscle weakness, Physical deconditioning       pravastatin 10 MG tablet    PRAVACHOL    90 tablet    TAKE ONE TABLET BY MOUTH EVERY DAY    Hyperlipidemia LDL goal <100       predniSONE 5 MG tablet    DELTASONE    90 tablet    Take 1 tablet (5 mg) by mouth daily    Kidney replaced by transplant       simethicone 125 MG Chew chewable tablet    MYLICON    120 tablet    Take 1 tablet (125 mg) by mouth as needed for intestinal gas        * tacrolimus 0.5 MG capsule    GENERIC EQUIVALENT    180 capsule    HOLD    Kidney transplant recipient       * tacrolimus 1 MG capsule    GENERIC EQUIVALENT    180 capsule    Take 3  capsules (3 mg) by mouth 2 times daily    Kidney transplant recipient, Long-term use of immunosuppressant medication       TraMADol HCl 50 MG Tbdp     90 tablet    Take 50 mg by mouth 3 times daily as needed    Chronic shoulder pain, unspecified laterality       TRILEPTAL 300 MG tablet   Generic drug:  OXcarbazepine     180 tablet    Take 1 tablet (300 mg) by mouth 2 times daily For pain    Diabetic polyneuropathy associated with diabetes mellitus due to underlying condition (H)       vitamin D 2000 UNITS tablet     100 tablet    Take 4,000 Units by mouth 2 times daily    Vitamin deficiency       * Notice:  This list has 8 medication(s) that are the same as other medications prescribed for you. Read the directions carefully, and ask your doctor or other care provider to review them with you.

## 2017-10-04 NOTE — PROGRESS NOTES
SUBJECTIVE:                                                    Gem Jade is a 68 year old female who is seen in her home due to inability to leave the home unassisted, morbid obesity, severe vertigo, generalized weakness, chronic pain, bedbound, rarely able to get up in the WC, seen today for the following health issues,         KIDNEY TRANSPLANT       Duration: right kidney replaced, left one non functional     Description (location/character/radiation): chronic managed by kidney transplant team, KACI Jacinto      Intensity:  moderate    Accompanying signs and symptoms: fatigue,   History (similar episodes/previous evaluation):  kidney transplant 10/29/1999      Precipitating or alleviating factors: DM, bed bound, rarely gets up in her WC     Therapies tried and outcome: see transplant team notes, last labs done 10/2/2017          Diabetes Follow-up       Patient is checking blood sugars: 1-2x daily    Diabetic concerns: frequent infections      Pt currently taking Lantus 24 units daily, Novolog 10 units with meals BID when not testing BS and uses sliding scale 2 units for every 50 over 201. Has been doing better with remembering Novolog  Trying to eat 2x daily, still not getting up until noon, naps after dinner, then up until 4am    Pt currently taking Lantus 24 units daily, Novolog 10 units with meals BID and uses sliding scale 2 units for every 50 over 201.     In Medicare coverage gap, again discussed switching Novolog/Lantus regimen to Relion NPH/R.   SMBG:  two - three times daily.   Ranges (glucometer) Fasting 161, 151, 111,222 PM: 163, 189, 182,214    Symptoms of low blood sugar? none. Frequency of hypoglycemia? none  Recent symptoms of high blood sugar? none  Pt is not taking an ACEi/ARB. (Hx worsening renal function when on ACE/ARB therapy)  Aspirin: taking aspirin 81mg daily without problems    Diet/exercise: has been eating 2 times a day, appetite is normal.      Symptoms of hypoglycemia (low  blood sugar): shaky, weak, lethargy, confusion, Symptoms occur if sugars < below 50.     Paresthesias (numbness or burning in feet) or sores: Yes hands and feet, using gabapentin with good relief     Date of last diabetic eye exam: unsure  HgbA1c 5.6 7/13/2017                   Amount of exercise or physical activity: None, bed bound, getting up in her wheel chair now several times, due to weakness and vertigo, now has a high back WC so she can tilt back when her vertigo gets bad, working with PT and OT to improve strength, hasn't had a chance to get up in her chair yet       Problems taking medications regularly: No,  set them up and adm them    Medication side effects: none  Diet: diabetic, appetite has improved, diet improved, less simple carbs, following renal diet         Musculoskeletal problem/pain       Duration: Chronic, shoulder is stable today, generalized weakness and physical deconditioning, back pain now off and on       Description  Location: shoulders, left the worse     Intensity:  Moderate,2-7 /10    Accompanying signs and symptoms: numbness and weakness in UE    History  Previous similar problem: YES  Previous evaluation:  x-ray, MRI and orthopedic evaluation  RIGHT SHOULDER THREE VIEWS 5/19/2013 2:23 PM  HISTORY: Rule out fracture.  COMPARISON: 9/14/2012.  FINDINGS: Again seen are degenerative changes of the  acromioclavicular joint. The right shoulder is otherwise unremarkable  and unchanged.    Precipitating or alleviating factors:  Trauma or overuse: YES- uses her arms to turn herself in bed  Aggravating factors include: any use of UE  Therapies tried and outcome: using acetaminophen, using icy hot gel and tramadol weekly working on core strength, still using chinese patches and working well, working with OT and PT to get up in her chair               Hypertension Follow-up       Outpatient blood pressures are being checked by [facility], and patient with a wrist device    No longer taking  norvasc, or losartan, labetalol reduced to 150mg 2x daily and holding if systolic under 120    Low Salt Diet: not monitoring salt        UTI - Female  Duration of complaint:  [facility] inflated the balloon to 30cc and the leaking stopped     Dizziness  Duration of complaint: Long hx of vertigo, dx with core vertigo, becomes very nauseated and vomits when she attempts to get up, this prevents her from getting up in her WC, uses Zofran with some relief, tilting back in her WC would help alleviate her vertigo and allow her to get out of bed and go to app, now has a high back WC, working with PT on core strength       Problem list and histories reviewed & adjusted, as indicated.  Additional history: as documented  Recent Labs   Lab Test  10/02/17   1120  09/21/17   1342   07/13/17   1105   04/26/17   0638   04/22/17   0942   04/19/17   0005   03/09/17   1310   12/05/16   1210   08/30/16   0032   06/13/16   1140   02/05/15   1250   05/16/13   1240   A1C   --    --    --   5.6   --    --    --    --    --    --    --    --    --   7.4*   --    --    --   6.7*   < >  6.9*   < >  7.2*   LDL   --    --    --    --    --    --    --    --    --    --    --    --    --   64   --    --    --    --    --   18   --   149*   HDL   --    --    --    --    --    --    --    --    --    --    --    --    --   46*   --    --    --    --    --   43*   --   42*   TRIG   --    --    --    --    --    --    --    --    --    --    --    --    --   272*   --    --    --    --    --   223*   --   262*   ALT   --    --    --    --    --   23   --   42   --   44   < >   --    < >   --    < >  6   < >   --    < >   --    < >   --    CR  1.16*  1.86*   < >  1.23*   < >  1.71*   < >  1.16*   < >  1.53*   < >   --    < >   --    < >   --    < >   --    < >  0.67   < >  0.70   GFRESTIMATED  46*  27*   < >  43*   < >  29*   < >  46*   < >  34*   < >   --    < >   --    < >   --    < >   --    < >  87   < >  84   GFRESTBLACK  56*  32*   < >  52*   < >   36*   < >  56*   < >  41*   < >   --    < >   --    < >   --    < >   --    < >  >90   GFR Calc     < >  >90   POTASSIUM  4.6  4.3   < >  4.3   < >  3.8   < >  3.5   < >  4.1   < >   --    < >   --    < >   --    < >   --    < >  4.3   < >  3.9   TSH   --    --    --    --    --    --    --    --    --    --    --   1.20   --    --    --   1.01   --    --    < >   --    < >   --     < > = values in this interval not displayed.      BP Readings from Last 3 Encounters:   10/04/17 135/70   09/28/17 132/78   09/06/17 120/50    Wt Readings from Last 3 Encounters:   04/27/17 199 lb 8 oz (90.5 kg)   03/15/17 201 lb (91.2 kg)   02/20/17 205 lb 4.8 oz (93.1 kg)            Labs reviewed in EPIC    Reviewed and updated as needed this visit by clinical staff       Reviewed and updated as needed this visit by Provider         ROS:  Constitutional, HEENT, cardiovascular, pulmonary, gi and gu systems are negative, except as otherwise noted.    OBJECTIVE:                                                    /70 (BP Location: Right arm, Cuff Size: Adult Regular)  Pulse 64  Temp 98  F (36.7  C) (Oral)  Resp 16  SpO2 99%  Last weight 4/2017 199lbs and 8oz  There is no height or weight on file to calculate BMI.  GENERAL: alert, mild distress, obese, elderly and laying on her back in her bed, engaged in the conversation   RESP: lungs clear to auscultation - no rales, rhonchi or wheezes  CV: regular rate and rhythm, normal S1 S2, no S3 or S4, no murmur, click or rub, no peripheral edema   ABDOMEN: soft, nontender, no masses and bowel sounds normal  MS: no gross musculoskeletal defects, limited ROM in shoulders,hips generalized weakness noted, no edema  SKIN: no suspicious lesions or rashes  PSYCH: mentation appears normal, affect normal/bright    Diagnostic Test Results:  Results for orders placed or performed in visit on 10/02/17   Tacrolimus level   Result Value Ref Range    Tacrolimus Last Dose unknown      Tacrolimus Level 4.4 (L) 5.0 - 15.0 ug/L   Basic metabolic panel   Result Value Ref Range    Sodium 139 133 - 144 mmol/L    Potassium 4.6 3.4 - 5.3 mmol/L    Chloride 106 94 - 109 mmol/L    Carbon Dioxide 24 20 - 32 mmol/L    Anion Gap 9 3 - 14 mmol/L    Glucose 164 (H) 70 - 99 mg/dL    Urea Nitrogen 52 (H) 7 - 30 mg/dL    Creatinine 1.16 (H) 0.52 - 1.04 mg/dL    GFR Estimate 46 (L) >60 mL/min/1.7m2    GFR Estimate If Black 56 (L) >60 mL/min/1.7m2    Calcium 8.2 (L) 8.5 - 10.1 mg/dL   CBC with platelets   Result Value Ref Range    WBC 6.2 4.0 - 11.0 10e9/L    RBC Count 3.54 (L) 3.8 - 5.2 10e12/L    Hemoglobin 10.8 (L) 11.7 - 15.7 g/dL    Hematocrit 34.2 (L) 35.0 - 47.0 %    MCV 97 78 - 100 fl    MCH 30.5 26.5 - 33.0 pg    MCHC 31.6 31.5 - 36.5 g/dL    RDW 14.7 10.0 - 15.0 %    Platelet Count 175 150 - 450 10e9/L     *Note: Due to a large number of results and/or encounters for the requested time period, some results have not been displayed. A complete set of results can be found in Results Review.        ASSESSMENT/PLAN:                                                    ASSESSMENT / PLAN:  (Z94.0) Kidney replaced by transplant  (primary encounter diagnosis)  Comment: followed by transplant team   Plan: labs improving, continue to monitor     (I10) Benign essential hypertension  Comment: stable   Plan: Continue current meds, hold for systolic below 120     (E66.01) Morbid obesity, unspecified obesity type (H)  Comment: slow weight loss  Plan: Continue work with PT on core strength, goal is to get up in her WC    (M25.519,  G89.29) Chronic shoulder pain, unspecified laterality  Comment: chronic off and on   Plan: continue ROM exercises, and pain relieving topicals     (E11.5908) Diabetes mellitus with background retinopathy (H)  Comment: improving  Plan: continue lantus and SS novolog, discussed diet and regular movement     Patient Instructions   Today's visit:    We will see you in one month    Please work on your  diet and exercise    Please try and get up in your chair        Marivel Barcenas, NP, APRN [facility]    Visit time 60   min with > than 50% of the time spent in counseling on: care coordination, DM, kidney transplant, shoulder pain, obesity,

## 2017-10-04 NOTE — NURSING NOTE
"Chief Complaint   Patient presents with     RECHECK       Initial /70 (BP Location: Right arm, Cuff Size: Adult Regular)  Pulse 64  Temp 98  F (36.7  C) (Oral)  Resp 16  SpO2 99% Estimated body mass index is 31.24 kg/(m^2) as calculated from the following:    Height as of 4/19/17: 5' 7.01\" (1.702 m).    Weight as of 4/27/17: 199 lb 8 oz (90.5 kg).  Medication Reconciliation: complete    "

## 2017-10-04 NOTE — PATIENT INSTRUCTIONS
Today's visit:    We will see you in one month    Please work on your diet and exercise    Please try and get up in your chair

## 2017-10-05 NOTE — PATIENT INSTRUCTIONS
Recommendations from today's MTM visit:                                                        1. I will work with the clinic staff on making sure you get somebody to draw your blood for your kidney labs    Next MTM visit: 1 month    To schedule another MTM appointment, please call the clinic directly or you may call the MTM scheduling line at [phone] or toll-free at [phone].     My Clinical Pharmacist's contact information:                                                      It was a pleasure seeing you today!  Please feel free to contact me with any questions or concerns you have.      Conchita Toledo, PharmD, BCACP     You may receive a survey about the MTM services you received.  I would appreciate your feedback to help me serve you better in the future. Please fill it out and return it when you can. Your comments will be anonymous.

## 2017-10-09 DIAGNOSIS — Z94.0 KIDNEY TRANSPLANT RECIPIENT: ICD-10-CM

## 2017-10-09 DIAGNOSIS — D84.9 IMMUNOSUPPRESSED STATUS (H): ICD-10-CM

## 2017-10-09 DIAGNOSIS — Z79.60 LONG-TERM USE OF IMMUNOSUPPRESSANT MEDICATION: ICD-10-CM

## 2017-10-09 PROCEDURE — 80197 ASSAY OF TACROLIMUS: CPT | Performed by: INTERNAL MEDICINE

## 2017-10-09 PROCEDURE — 36415 COLL VENOUS BLD VENIPUNCTURE: CPT | Performed by: INTERNAL MEDICINE

## 2017-10-09 PROCEDURE — 80048 BASIC METABOLIC PNL TOTAL CA: CPT | Performed by: INTERNAL MEDICINE

## 2017-10-10 LAB
ANION GAP SERPL CALCULATED.3IONS-SCNC: 11 MMOL/L (ref 3–14)
BUN SERPL-MCNC: 45 MG/DL (ref 7–30)
CALCIUM SERPL-MCNC: 8 MG/DL (ref 8.5–10.1)
CHLORIDE SERPL-SCNC: 100 MMOL/L (ref 94–109)
CO2 SERPL-SCNC: 24 MMOL/L (ref 20–32)
CREAT SERPL-MCNC: 1.62 MG/DL (ref 0.52–1.04)
GFR SERPL CREATININE-BSD FRML MDRD: 31 ML/MIN/1.7M2
GLUCOSE SERPL-MCNC: 272 MG/DL (ref 70–99)
POTASSIUM SERPL-SCNC: 3.6 MMOL/L (ref 3.4–5.3)
SODIUM SERPL-SCNC: 135 MMOL/L (ref 133–144)
TACROLIMUS BLD-MCNC: 4.9 UG/L (ref 5–15)
TME LAST DOSE: ABNORMAL H

## 2017-10-11 ENCOUNTER — TELEPHONE (OUTPATIENT)
Dept: FAMILY MEDICINE | Facility: CLINIC | Age: 72
End: 2017-10-11

## 2017-10-11 NOTE — TELEPHONE ENCOUNTER
Yashira had called with [facility] requesting orders for skilled nursing for cath changes every 3 weeks. A 2w8.    Yashira can be reached for questions or verbal.

## 2017-10-16 ENCOUNTER — TELEPHONE (OUTPATIENT)
Dept: FAMILY MEDICINE | Facility: CLINIC | Age: 72
End: 2017-10-16

## 2017-10-16 ENCOUNTER — HOSPITAL ENCOUNTER (OUTPATIENT)
Dept: LAB | Facility: CLINIC | Age: 72
Discharge: HOME | End: 2017-10-16
Attending: INTERNAL MEDICINE | Admitting: NURSE PRACTITIONER
Payer: MEDICARE

## 2017-10-16 DIAGNOSIS — Z79.60 LONG-TERM USE OF IMMUNOSUPPRESSANT MEDICATION: ICD-10-CM

## 2017-10-16 DIAGNOSIS — D84.9 IMMUNOSUPPRESSED STATUS (H): ICD-10-CM

## 2017-10-16 DIAGNOSIS — Z94.0 KIDNEY TRANSPLANT RECIPIENT: ICD-10-CM

## 2017-10-16 DIAGNOSIS — N39.0 URINARY TRACT INFECTION: Primary | ICD-10-CM

## 2017-10-16 DIAGNOSIS — N39.0 URINARY TRACT INFECTION: ICD-10-CM

## 2017-10-16 LAB
ALBUMIN UR-MCNC: 100 MG/DL
AMORPH CRY #/AREA URNS HPF: ABNORMAL /HPF
ANION GAP SERPL CALCULATED.3IONS-SCNC: 9 MMOL/L (ref 3–14)
APPEARANCE UR: ABNORMAL
BACTERIA #/AREA URNS HPF: ABNORMAL /HPF
BASOPHILS # BLD AUTO: 0 10E9/L (ref 0–0.2)
BASOPHILS NFR BLD AUTO: 0.2 %
BILIRUB UR QL STRIP: NEGATIVE
BUN SERPL-MCNC: 37 MG/DL (ref 7–30)
CALCIUM SERPL-MCNC: 7.9 MG/DL (ref 8.5–10.1)
CHLORIDE SERPL-SCNC: 92 MMOL/L (ref 94–109)
CO2 SERPL-SCNC: 24 MMOL/L (ref 20–32)
COLOR UR AUTO: YELLOW
CREAT SERPL-MCNC: 1.42 MG/DL (ref 0.52–1.04)
DIFFERENTIAL METHOD BLD: ABNORMAL
EOSINOPHIL # BLD AUTO: 0 10E9/L (ref 0–0.7)
EOSINOPHIL NFR BLD AUTO: 0.2 %
ERYTHROCYTE [DISTWIDTH] IN BLOOD BY AUTOMATED COUNT: 13.7 % (ref 10–15)
GFR SERPL CREATININE-BSD FRML MDRD: 36 ML/MIN/1.7M2
GLUCOSE SERPL-MCNC: 136 MG/DL (ref 70–99)
GLUCOSE UR STRIP-MCNC: NEGATIVE MG/DL
HCT VFR BLD AUTO: 32.2 % (ref 35–47)
HGB BLD-MCNC: 11 G/DL (ref 11.7–15.7)
HGB UR QL STRIP: ABNORMAL
IMM GRANULOCYTES # BLD: 0 10E9/L (ref 0–0.4)
IMM GRANULOCYTES NFR BLD: 0.3 %
KETONES UR STRIP-MCNC: NEGATIVE MG/DL
LEUKOCYTE ESTERASE UR QL STRIP: ABNORMAL
LYMPHOCYTES # BLD AUTO: 1.2 10E9/L (ref 0.8–5.3)
LYMPHOCYTES NFR BLD AUTO: 11 %
MCH RBC QN AUTO: 31.3 PG (ref 26.5–33)
MCHC RBC AUTO-ENTMCNC: 34.2 G/DL (ref 31.5–36.5)
MCV RBC AUTO: 92 FL (ref 78–100)
MONOCYTES # BLD AUTO: 1.1 10E9/L (ref 0–1.3)
MONOCYTES NFR BLD AUTO: 9.9 %
NEUTROPHILS # BLD AUTO: 8.6 10E9/L (ref 1.6–8.3)
NEUTROPHILS NFR BLD AUTO: 78.4 %
NITRATE UR QL: POSITIVE
NRBC # BLD AUTO: 0 10*3/UL
NRBC BLD AUTO-RTO: 0 /100
PH UR STRIP: 7 PH (ref 5–7)
PLATELET # BLD AUTO: 127 10E9/L (ref 150–450)
POTASSIUM SERPL-SCNC: 4.5 MMOL/L (ref 3.4–5.3)
PROCALCITONIN SERPL-MCNC: 0.48 NG/ML
RBC # BLD AUTO: 3.51 10E12/L (ref 3.8–5.2)
RBC #/AREA URNS AUTO: 13 /HPF (ref 0–2)
SODIUM SERPL-SCNC: 125 MMOL/L (ref 133–144)
SOURCE: ABNORMAL
SP GR UR STRIP: 1.01 (ref 1–1.03)
UROBILINOGEN UR STRIP-MCNC: NORMAL MG/DL (ref 0–2)
UROBILINOGEN UR STRIP-MCNC: NORMAL MG/DL (ref 0–2)
WBC # BLD AUTO: 11 10E9/L (ref 4–11)
WBC #/AREA URNS AUTO: 71 /HPF (ref 0–2)

## 2017-10-16 PROCEDURE — 87186 SC STD MICRODIL/AGAR DIL: CPT | Performed by: INTERNAL MEDICINE

## 2017-10-16 PROCEDURE — 85025 COMPLETE CBC W/AUTO DIFF WBC: CPT | Performed by: NURSE PRACTITIONER

## 2017-10-16 PROCEDURE — 80197 ASSAY OF TACROLIMUS: CPT | Performed by: NURSE PRACTITIONER

## 2017-10-16 PROCEDURE — 80048 BASIC METABOLIC PNL TOTAL CA: CPT | Performed by: NURSE PRACTITIONER

## 2017-10-16 PROCEDURE — 36415 COLL VENOUS BLD VENIPUNCTURE: CPT | Performed by: NURSE PRACTITIONER

## 2017-10-16 PROCEDURE — 84145 PROCALCITONIN (PCT): CPT | Performed by: NURSE PRACTITIONER

## 2017-10-16 PROCEDURE — 81001 URINALYSIS AUTO W/SCOPE: CPT | Performed by: INTERNAL MEDICINE

## 2017-10-16 PROCEDURE — 87088 URINE BACTERIA CULTURE: CPT | Performed by: INTERNAL MEDICINE

## 2017-10-16 PROCEDURE — 84578 TEST URINE UROBILINOGEN: CPT | Performed by: NURSE PRACTITIONER

## 2017-10-16 PROCEDURE — 87086 URINE CULTURE/COLONY COUNT: CPT | Performed by: INTERNAL MEDICINE

## 2017-10-16 NOTE — TELEPHONE ENCOUNTER
Spoke with Zhane, phlebotomist who states patient states she is having UTI symptoms.  Symptoms include a fever (but they do not have a thermometer) and back pain for the past week.  She also stated patient is having chills and shakes.    Gave verbal order for labs per RN protocol.  She will obtain the blood work only.      Spoke with spouse who called right after Zhane hung up and he was upset stating there should be a standing order for patient's UTI.  Explained protocol again with spouse and informed him that orders are being written.  Will plan to have phlebotomist draw blood work and then will contact homecare for the ua/uc.   Verbalized understanding.    Spoke with SEKOU Coyne Clinical Coordinator [facility] and gave verbal order for ua/uc.  She will get scheduled with nurse as soon as possible.    Routing to provider as DENNIS.    Wendie Harris RN

## 2017-10-16 NOTE — TELEPHONE ENCOUNTER
Zhane called with lab [facility]. Was wondering if she should collect a UAUC because she was informed by the patient while she was out there that she has a fever and pain in her lower back.     FWD to RN

## 2017-10-17 ENCOUNTER — TELEPHONE (OUTPATIENT)
Dept: TRANSPLANT | Facility: CLINIC | Age: 72
End: 2017-10-17

## 2017-10-17 DIAGNOSIS — Z94.0 KIDNEY TRANSPLANT RECIPIENT: ICD-10-CM

## 2017-10-17 DIAGNOSIS — D84.9 IMMUNOSUPPRESSED STATUS (H): ICD-10-CM

## 2017-10-17 DIAGNOSIS — R30.0 DYSURIA: ICD-10-CM

## 2017-10-17 DIAGNOSIS — N39.0 FREQUENT UTI: ICD-10-CM

## 2017-10-17 DIAGNOSIS — R82.81 PYURIA: ICD-10-CM

## 2017-10-17 DIAGNOSIS — Z79.60 LONG-TERM USE OF IMMUNOSUPPRESSANT MEDICATION: ICD-10-CM

## 2017-10-17 DIAGNOSIS — N39.0 URINARY TRACT INFECTION: Primary | ICD-10-CM

## 2017-10-17 LAB
TACROLIMUS BLD-MCNC: <3 UG/L (ref 5–15)
TME LAST DOSE: ABNORMAL H

## 2017-10-17 RX ORDER — AMOXICILLIN AND CLAVULANATE POTASSIUM 500; 125 MG/1; MG/1
1 TABLET, FILM COATED ORAL 3 TIMES DAILY
Qty: 42 TABLET | Refills: 0 | Status: ON HOLD | OUTPATIENT
Start: 2017-10-17 | End: 2017-11-09

## 2017-10-17 RX ORDER — AMOXICILLIN AND CLAVULANATE POTASSIUM 500; 125 MG/1; MG/1
1 TABLET, FILM COATED ORAL 2 TIMES DAILY
Qty: 42 TABLET | Refills: 0 | Status: SHIPPED | OUTPATIENT
Start: 2017-10-17 | End: 2017-10-17

## 2017-10-17 NOTE — TELEPHONE ENCOUNTER
"Chang (spouse) calling, states he was told this afternoon that he would be called related to urine sample submitted today.  At 21:20, calling to find out status of UA and why no return phone call ?  No UA noted in EPIC lab section,  certain urine was collected.  This nurse did call over to [facility] lab where the sample was found and now being entered into the system.  Chang very anxious given Gem's history of hospitalizations for UTIs and would like her started on abx ASAP if lab warrants it.  Currently Gem has clear - cloudy urine with no sediment and non odorous.  Temp for past two days has been \"99\" with normal temp for Gem being \"97\".  Could you check on the status of the UA and update Chang if there is anything to report ?  Again, he is very anxious about this and is wanting to prevent an ED visit.      Thanks  Porsha Ramos RN  FNA     "

## 2017-10-17 NOTE — TELEPHONE ENCOUNTER
Noted that results from labs, except UC are complete.  Routing to provider to review.    Wendie Harris RN

## 2017-10-18 ENCOUNTER — TELEPHONE (OUTPATIENT)
Dept: TRANSPLANT | Facility: CLINIC | Age: 72
End: 2017-10-18

## 2017-10-18 ENCOUNTER — TELEPHONE (OUTPATIENT)
Dept: PHARMACY | Facility: CLINIC | Age: 72
End: 2017-10-18

## 2017-10-18 DIAGNOSIS — Z94.0 KIDNEY TRANSPLANT RECIPIENT: ICD-10-CM

## 2017-10-18 DIAGNOSIS — Z79.60 LONG-TERM USE OF IMMUNOSUPPRESSANT MEDICATION: Primary | ICD-10-CM

## 2017-10-18 LAB
BACTERIA SPEC CULT: ABNORMAL
Lab: ABNORMAL
SPECIMEN SOURCE: ABNORMAL

## 2017-10-18 RX ORDER — TACROLIMUS 0.5 MG/1
0.5 CAPSULE ORAL 2 TIMES DAILY
Qty: 60 CAPSULE | Refills: 3
Start: 2017-10-18 | End: 2017-10-18

## 2017-10-18 RX ORDER — TACROLIMUS 0.5 MG/1
0.5 CAPSULE ORAL 2 TIMES DAILY
Qty: 60 CAPSULE | Refills: 3 | Status: ON HOLD | OUTPATIENT
Start: 2017-10-18 | End: 2017-11-19

## 2017-10-18 RX ORDER — TACROLIMUS 1 MG/1
3 CAPSULE ORAL 2 TIMES DAILY
Qty: 180 CAPSULE | Refills: 3 | Status: ON HOLD | OUTPATIENT
Start: 2017-10-18 | End: 2017-11-19

## 2017-10-18 RX ORDER — TACROLIMUS 1 MG/1
3 CAPSULE ORAL 2 TIMES DAILY
Qty: 180 CAPSULE | Refills: 3
Start: 2017-10-18 | End: 2017-10-18

## 2017-10-18 NOTE — TELEPHONE ENCOUNTER
Tacrolimus level <3.0, goal 3-5.  Current dose 3 mg twice daily    PLAN:  Take 1 mg tacrolimus now (9:00 am)  Then begin 3.5 mg twice daily with next dose (12 noon).  Check tacrolimus level and creatinine next week    PHONE CALL:  Call to patient's , Chang, with instructions.

## 2017-10-18 NOTE — TELEPHONE ENCOUNTER
Received phone call from pt's  Keshawn. Cancelled today's appt since pt has not been feeling well with UTI.    States pt is feeling somewhat better.  She has been restricting her fluids due to hyponatremia. No confusion noted. Denies SE so far to Augmentin.     Will f/u in 2 weeks as scheduled    Conchita Toledo, VivianD, BCACP

## 2017-10-19 ENCOUNTER — TELEPHONE (OUTPATIENT)
Dept: FAMILY MEDICINE | Facility: CLINIC | Age: 72
End: 2017-10-19

## 2017-10-19 NOTE — TELEPHONE ENCOUNTER
Sent message to [facility] to draw labs per request from transplant team (tacrolimus and creatinine).    Wendie Harris RN

## 2017-10-19 NOTE — TELEPHONE ENCOUNTER
Received message from [facility] stating lab will be drawn on 10/25/17.    Wendie Harris RN

## 2017-10-20 ENCOUNTER — DOCUMENTATION ONLY (OUTPATIENT)
Dept: TRANSPLANT | Facility: CLINIC | Age: 72
End: 2017-10-20

## 2017-10-20 DIAGNOSIS — Z79.60 LONG-TERM USE OF IMMUNOSUPPRESSANT MEDICATION: ICD-10-CM

## 2017-10-20 DIAGNOSIS — N39.0 URINARY TRACT INFECTION: Primary | ICD-10-CM

## 2017-10-20 DIAGNOSIS — Z94.0 KIDNEY TRANSPLANT RECIPIENT: ICD-10-CM

## 2017-10-20 DIAGNOSIS — Z97.8 CHRONIC INDWELLING FOLEY CATHETER: ICD-10-CM

## 2017-10-20 DIAGNOSIS — D84.9 IMMUNOSUPPRESSED STATUS (H): ICD-10-CM

## 2017-10-20 NOTE — PROGRESS NOTES
ISSUE: Has UTI growing E coli >100,000 col/mL and two strains Klebsiella, one >100,000 and the other <10,000.    PLAN: per Dr. Elizabeth  Augmentin 500 mg tid for 14 days started on Tuesday 10/17. Change Johnson catheter and get a urine culture two days later. Review organisms and susceptibilities on UC&S and adjust antibiotic as necessary. Discussed plan with Keshawn Jade. Request for catheter change and urine culture sent to complex care team.

## 2017-10-23 ENCOUNTER — TELEPHONE (OUTPATIENT)
Dept: FAMILY MEDICINE | Facility: CLINIC | Age: 72
End: 2017-10-23

## 2017-10-23 NOTE — TELEPHONE ENCOUNTER
Received request from provider to contact [facility] nurse with information as note by Dr Elizabeth.      See Dr. Elizabeth's plan below my request.     Would the complex care team be able to send a nurse to change Shantelle's Johnson catheter next week? Keshawn suggested Tuesday would be a good day, if possible. Could the nurse leave a specimen container for Keshawn to get a urine sample to drop off at a [place] lab for UC&S on Thursday? Or could someone else do it if Keshawn can't     I will be out of the office the week of 10/23, but my colleagues, Porsha Husain and Rachael, will be covering for me.     C/C: Porsha Husain Meghan     ISSUE: Has UTI growing E coli >100,000 col/mL and two strains Klebsiella, one >100,000 and the other <10,000.     PLAN: per Dr. Elizabeth   Augmentin 500 mg tid for 14 days started on Tuesday 10/17. Wants catheter changed next week and get a urine culture on Thursday or Friday (request sent to complex care team to change catheter on Tuesday 10/24 and UC on Thursday 10/24). Last antibiotic dose should be the morning of 10/31. Based on the susceptibilities, Dr. Elizabeth would change the antibiotic if necessary.     Spoke with SEKOU Mcdonald Clinical Coordinator with [facility] regarding request as noted.  She will make sure RN will be able to do visit on both Tuesday to change the catheter and Thursday to collect new urine sample.      Sent message to SEKOU Ac [facility] with information as noted as well.    Wendie Harris RN

## 2017-10-25 DIAGNOSIS — Z94.0 KIDNEY TRANSPLANT RECIPIENT: ICD-10-CM

## 2017-10-25 DIAGNOSIS — Z79.60 LONG-TERM USE OF IMMUNOSUPPRESSANT MEDICATION: ICD-10-CM

## 2017-10-25 LAB
CREAT SERPL-MCNC: 1.49 MG/DL (ref 0.52–1.04)
CREAT SERPL-MCNC: NORMAL MG/DL (ref 0.52–1.04)
GFR SERPL CREATININE-BSD FRML MDRD: 35 ML/MIN/1.7M2
GFR SERPL CREATININE-BSD FRML MDRD: NORMAL ML/MIN/1.7M2
TACROLIMUS BLD-MCNC: NORMAL UG/L (ref 5–15)
TME LAST DOSE: 2300 H

## 2017-10-25 PROCEDURE — 36415 COLL VENOUS BLD VENIPUNCTURE: CPT | Performed by: INTERNAL MEDICINE

## 2017-10-25 PROCEDURE — 80197 ASSAY OF TACROLIMUS: CPT | Performed by: INTERNAL MEDICINE

## 2017-10-25 PROCEDURE — 82565 ASSAY OF CREATININE: CPT | Performed by: INTERNAL MEDICINE

## 2017-10-26 LAB
TACROLIMUS BLD-MCNC: 5.5 UG/L (ref 5–15)
TME LAST DOSE: 2300 H

## 2017-10-27 LAB
ALBUMIN UR-MCNC: 100 MG/DL
APPEARANCE UR: ABNORMAL
BILIRUB UR QL STRIP: NEGATIVE
COLOR UR AUTO: YELLOW
GLUCOSE UR STRIP-MCNC: 70 MG/DL
HGB UR QL STRIP: ABNORMAL
KETONES UR STRIP-MCNC: NEGATIVE MG/DL
LEUKOCYTE ESTERASE UR QL STRIP: ABNORMAL
NITRATE UR QL: NEGATIVE
PH UR STRIP: 5.5 PH (ref 5–7)
RBC #/AREA URNS AUTO: 110 /HPF (ref 0–2)
SOURCE: ABNORMAL
SP GR UR STRIP: 1.01 (ref 1–1.03)
SQUAMOUS #/AREA URNS AUTO: 2 /HPF (ref 0–1)
UROBILINOGEN UR STRIP-MCNC: NORMAL MG/DL (ref 0–2)
WBC #/AREA URNS AUTO: 1105 /HPF (ref 0–2)
WBC CLUMPS #/AREA URNS HPF: PRESENT /HPF

## 2017-10-27 PROCEDURE — 81003 URINALYSIS AUTO W/O SCOPE: CPT

## 2017-10-27 PROCEDURE — 87086 URINE CULTURE/COLONY COUNT: CPT

## 2017-10-27 PROCEDURE — 87106 FUNGI IDENTIFICATION YEAST: CPT

## 2017-10-29 LAB
BACTERIA SPEC CULT: ABNORMAL
SPECIMEN SOURCE: ABNORMAL

## 2017-11-01 ENCOUNTER — OFFICE VISIT (OUTPATIENT)
Dept: PHARMACY | Facility: CLINIC | Age: 72
End: 2017-11-01
Payer: COMMERCIAL

## 2017-11-01 ENCOUNTER — CARE COORDINATION (OUTPATIENT)
Dept: CARE COORDINATION | Facility: CLINIC | Age: 72
End: 2017-11-01

## 2017-11-01 ENCOUNTER — OFFICE VISIT (OUTPATIENT)
Dept: FAMILY MEDICINE | Facility: CLINIC | Age: 72
End: 2017-11-01
Payer: COMMERCIAL

## 2017-11-01 VITALS — SYSTOLIC BLOOD PRESSURE: 120 MMHG | DIASTOLIC BLOOD PRESSURE: 60 MMHG | HEART RATE: 71 BPM | OXYGEN SATURATION: 97 %

## 2017-11-01 DIAGNOSIS — E11.42 TYPE 2 DIABETES MELLITUS WITH DIABETIC POLYNEUROPATHY, WITH LONG-TERM CURRENT USE OF INSULIN (H): Primary | ICD-10-CM

## 2017-11-01 DIAGNOSIS — Z94.0 KIDNEY REPLACED BY TRANSPLANT: Primary | ICD-10-CM

## 2017-11-01 DIAGNOSIS — I10 BENIGN ESSENTIAL HYPERTENSION: ICD-10-CM

## 2017-11-01 DIAGNOSIS — M25.519 CHRONIC SHOULDER PAIN, UNSPECIFIED LATERALITY: ICD-10-CM

## 2017-11-01 DIAGNOSIS — G47.33 OBSTRUCTIVE SLEEP APNEA SYNDROME: ICD-10-CM

## 2017-11-01 DIAGNOSIS — G89.29 CHRONIC SHOULDER PAIN, UNSPECIFIED LATERALITY: ICD-10-CM

## 2017-11-01 DIAGNOSIS — M54.9 BACK PAIN, UNSPECIFIED BACK LOCATION, UNSPECIFIED BACK PAIN LATERALITY, UNSPECIFIED CHRONICITY: ICD-10-CM

## 2017-11-01 DIAGNOSIS — Z94.0 KIDNEY REPLACED BY TRANSPLANT: ICD-10-CM

## 2017-11-01 DIAGNOSIS — E66.01 MORBID OBESITY, UNSPECIFIED OBESITY TYPE (H): ICD-10-CM

## 2017-11-01 DIAGNOSIS — E11.42 DIABETIC POLYNEUROPATHY ASSOCIATED WITH TYPE 2 DIABETES MELLITUS (H): ICD-10-CM

## 2017-11-01 DIAGNOSIS — Z79.4 TYPE 2 DIABETES MELLITUS WITH DIABETIC POLYNEUROPATHY, WITH LONG-TERM CURRENT USE OF INSULIN (H): Primary | ICD-10-CM

## 2017-11-01 PROCEDURE — 99606 MTMS BY PHARM EST 15 MIN: CPT | Performed by: PHARMACIST

## 2017-11-01 PROCEDURE — 99607 MTMS BY PHARM ADDL 15 MIN: CPT | Performed by: PHARMACIST

## 2017-11-01 PROCEDURE — 99350 HOME/RES VST EST HIGH MDM 60: CPT | Performed by: NURSE PRACTITIONER

## 2017-11-01 NOTE — PROGRESS NOTES
SUBJECTIVE:                                                    Gem Jade is a 68 year old female who is seen in her home due to inability to leave the home unassisted, morbid obesity, severe vertigo, generalized weakness, chronic pain, bedbound, rarely able to get up in the WC, due to vertigo,  seen today for the following health issues, seen with Radha BOYD and Conchita Hu    We informed patient and discussed today that [facility] program is closing at the end of December. Patient received a letter today. Patient would be a stretcher transport at this time, cost would be around 500.00 to transport in, would try to return to [facility], unable to get into WC at this time due to back injury, vertigo and recent infection, would like to see Dr. Carlos but not until she can get up in a chair, unable to afford ambulance transport.  Discussed moving patient to the main level and turning dining room into her bedroom       KIDNEY TRANSPLANT       Duration: right kidney replaced, left one non functional     Description (location/character/radiation): chronic managed by kidney transplant team, KACI Jacinto      Intensity:  moderate    Accompanying signs and symptoms: fatigue,   History (similar episodes/previous evaluation):  kidney transplant 10/29/1999      Precipitating or alleviating factors: DM, bed bound, rarely gets up in her WC     Therapies tried and outcome: see transplant team notes, last labs done 10/25/17          Diabetes Follow-up       Patient is checking blood sugars: 1x daily    Diabetic concerns: frequent infections      Pt currently taking Lantus 24 units daily, Novolog 10 units with meals, not consistently,  generally only using sliding scale 1x daily, when not testing BS and uses sliding scale 2 units for every 50 over 201. Has been doing better with remembering Novolog  Trying to eat 2x daily, still not getting up until noon, naps after dinner, then up until 4am    Pt currently taking  Lantus 24 units daily, Novolog 10 units with meals BID usually,  and uses sliding scale 2 units for every 50 over 201.     In Medicare coverage gap, again discussed switching Novolog/Lantus regimen to Relion NPH/R. Out of the donut hole now    SMBG:  two - three times daily.   Ranges (glucometer) Fasting 161, 151, 111,222 PM: 163, 189, 182,214    Symptoms of low blood sugar? none. Frequency of hypoglycemia? none  Recent symptoms of high blood sugar? none  Pt is not taking an ACEi/ARB. (Hx worsening renal function when on ACE/ARB therapy)  Aspirin: taking aspirin 81mg daily without problems    Diet/exercise: has been eating 2 times a day, appetite is low right now      Symptoms of hypoglycemia (low blood sugar): shaky, weak, lethargy, confusion, Symptoms occur if sugars < below 50.     Paresthesias (numbness or burning in feet) or sores: Yes hands and feet, using gabapentin with good relief     Date of last diabetic eye exam: unsure  HgbA1c 5.6 7/13/2017                   Amount of exercise or physical activity: None, bed bound, due to weakness and vertigo, now has a high back WC so she can tilt back when her vertigo gets bad, working with PT and OT to improve strength, hasn't had a chance to get up in her chair yet due to back injury and then UTI       Problems taking medications regularly: No,  set them up and adm them    Medication side effects: none  Diet: diabetic, appetite has improved, diet improved, less simple carbs, following renal diet         Musculoskeletal problem/pain       Duration: Chronic, shoulder is stable today, generalized weakness and physical deconditioning, back pain now has increased due to strain        Description  Location: shoulders, left the worse     Intensity:  Moderate,2-7 /10    Accompanying signs and symptoms: numbness and weakness in UE    History  Previous similar problem: YES  Previous evaluation:  x-ray, MRI and orthopedic evaluation  RIGHT SHOULDER THREE VIEWS 5/19/2013  2:23 PM  HISTORY: Rule out fracture.  COMPARISON: 9/14/2012.  FINDINGS: Again seen are degenerative changes of the  acromioclavicular joint. The right shoulder is otherwise unremarkable  and unchanged.    Precipitating or alleviating factors:  Trauma or overuse: YES- uses her arms to turn herself in bed  Aggravating factors include: any use of UE  Therapies tried and outcome: using acetaminophen, using icy hot gel and tramadol weekly working on core strength, still using chinese patches and working well, working with OT and PT to get up in her chair               Hypertension Follow-up       Outpatient blood pressures are being checked by [facility], and patient with a wrist device    No longer taking norvasc, or losartan, labetalol reduced to 150mg 2x daily and holding if systolic under 120    Low Salt Diet: not monitoring salt        UTI - Female  Duration of complaint:  [facility] inflated the balloon to 30cc and the leaking stopped, using silver tip cath and it is being changed every 3 weeks,  recent UTI, see note below from transplant coordinator RN:  Almost done with antibiotics now, last UA/UC showed the following:   Culture Micro (Abnormal) 10/27/2017  2:25    50,000 to 100,000 colonies/mL   Candida albicans / dubliniensis   Candida albicans and Candida dubliniensis are not routinely speciated      Progress Notes  Encounter Date: 10/20/2017  Jorgito Jacinto, RN   Transplant      []Hide copied text  ISSUE: Has UTI growing E coli >100,000 col/mL and two strains Klebsiella, one >100,000 and the other <10,000.     PLAN: per Dr. Elizabeth  Augmentin 500 mg tid for 14 days started on Tuesday 10/17. Change Johnson catheter and get a urine culture two days later. Review organisms and susceptibilities on UC&S and adjust antibiotic as necessary. Discussed plan with Keshawn Jade. Request for catheter change and urine culture sent to complex care team.      Electronically signed by Jorgito Jacinto RN at 10/20/2017  6:19 PM        Dizziness  Duration of complaint: Long hx of vertigo, dx with core vertigo, becomes very nauseated and vomits when she attempts to get up, this prevents her from getting up in her WC, uses Zofran with some relief, tilting back in her WC would help alleviate her vertigo and allow her to get out of bed and go to appts, now has a high back WC, working with PT on core strength but had a set back with hurting back and the UTI,       Problem list and histories reviewed & adjusted, as indicated.  Additional history: as documented  Recent Labs   Lab Test  10/25/17   1016  10/25/17   1015  10/16/17   1200  10/09/17   1150   07/13/17   1105   04/26/17   0638   04/22/17   0942   04/19/17   0005   03/09/17   1310   12/05/16   1210   08/30/16   0032   06/13/16   1140   02/05/15   1250   05/16/13   1240   A1C   --    --    --    --    --   5.6   --    --    --    --    --    --    --    --    --   7.4*   --    --    --   6.7*   < >  6.9*   < >  7.2*   LDL   --    --    --    --    --    --    --    --    --    --    --    --    --    --    --   64   --    --    --    --    --   18   --   149*   HDL   --    --    --    --    --    --    --    --    --    --    --    --    --    --    --   46*   --    --    --    --    --   43*   --   42*   TRIG   --    --    --    --    --    --    --    --    --    --    --    --    --    --    --   272*   --    --    --    --    --   223*   --   262*   ALT   --    --    --    --    --    --    --   23   --   42   --   44   < >   --    < >   --    < >  6   < >   --    < >   --    < >   --    CR  1.49*  Canceled, Test credited  1.42*  1.62*   < >  1.23*   < >  1.71*   < >  1.16*   < >  1.53*   < >   --    < >   --    < >   --    < >   --    < >  0.67   < >  0.70   GFRESTIMATED  35*  Canceled, Test credited  36*  31*   < >  43*   < >  29*   < >  46*   < >  34*   < >   --    < >   --    < >   --    < >   --    < >  87   < >  84   GFRESTBLACK  42*  Canceled, Test credited  44*  38*   < >  52*    < >  36*   < >  56*   < >  41*   < >   --    < >   --    < >   --    < >   --    < >  >90   GFR Calc     < >  >90   POTASSIUM   --    --   4.5  3.6   < >  4.3   < >  3.8   < >  3.5   < >  4.1   < >   --    < >   --    < >   --    < >   --    < >  4.3   < >  3.9   TSH   --    --    --    --    --    --    --    --    --    --    --    --    --   1.20   --    --    --   1.01   --    --    < >   --    < >   --     < > = values in this interval not displayed.      BP Readings from Last 3 Encounters:   10/04/17 135/70   09/28/17 132/78   09/06/17 120/50    Wt Readings from Last 3 Encounters:   04/27/17 199 lb 8 oz (90.5 kg)   03/15/17 201 lb (91.2 kg)   02/20/17 205 lb 4.8 oz (93.1 kg)            Labs reviewed in EPIC    Reviewed and updated as needed this visit by clinical staff       Reviewed and updated as needed this visit by Provider         ROS:  Constitutional, HEENT, cardiovascular, pulmonary, gi and gu systems are negative, except as otherwise noted.    OBJECTIVE:                                                    /60 (BP Location: Left arm, Patient Position: Supine, Cuff Size: Adult Regular)  Pulse 71  SpO2 97%  Last weight 4/2017 199lbs and 8oz  There is no height or weight on file to calculate BMI.  GENERAL: alert, mild distress, obese, elderly and laying on her back in her bed, engaged in the conversation   RESP: lungs clear to auscultation - no rales, rhonchi or wheezes  CV: regular rate and rhythm, normal S1 S2, no S3 or S4, no murmur, click or rub, no peripheral edema   ABDOMEN: soft, nontender, no masses and bowel sounds normal  MS: no gross musculoskeletal defects, limited ROM in shoulders,hips generalized weakness noted, no edema  SKIN: no suspicious lesions or rashes  PSYCH: mentation appears normal, affect normal/bright, sad and teary over clinic closing     Diagnostic Test Results:  Results for orders placed or performed in visit on 10/27/17   UA reflex to Microscopic and  Culture   Result Value Ref Range    Color Urine Yellow     Appearance Urine Cloudy     Glucose Urine 70 (A) NEG^Negative mg/dL    Bilirubin Urine Negative NEG^Negative    Ketones Urine Negative NEG^Negative mg/dL    Specific Gravity Urine 1.013 1.003 - 1.035    Blood Urine Moderate (A) NEG^Negative    pH Urine 5.5 5.0 - 7.0 pH    Protein Albumin Urine 100 (A) NEG^Negative mg/dL    Urobilinogen mg/dL Normal 0.0 - 2.0 mg/dL    Nitrite Urine Negative NEG^Negative    Leukocyte Esterase Urine Large (A) NEG^Negative    Source Catheterized Urine     RBC Urine 110 (H) 0 - 2 /HPF    WBC Urine 1105 (H) 0 - 2 /HPF    WBC Clumps Present (A) NEG^Negative /HPF    Squamous Epithelial /HPF Urine 2 (H) 0 - 1 /HPF   Urine Culture Aerobic Bacterial   Result Value Ref Range    Specimen Description Catheterized Urine     Culture Micro (A)      50,000 to 100,000 colonies/mL  Candida albicans / dubliniensis  Candida albicans and Candida dubliniensis are not routinely speciated       *Note: Due to a large number of results and/or encounters for the requested time period, some results have not been displayed. A complete set of results can be found in Results Review.        ASSESSMENT/PLAN:                                                    ASSESSMENT / PLAN:  (Z94.0) Kidney replaced by transplant  (primary encounter diagnosis)  Comment: Recent UTI treatment with Augmentin, almost finished, infections have improved with silver tipped cath and changing by [facility] every 3 weeks   Plan: will continue to monitor with her transplant team    (I10) Benign essential hypertension  Comment: improved on current regime  Plan: monitor    (E66.01) Morbid obesity, unspecified obesity type (H)  Comment: slow decrease in weight, now at 199lbs   Plan: discussed working on core strength to get up in her WC and moving patient's bedroom down stairs     (M25.519,  G89.29) Chronic shoulder pain, unspecified laterality  Comment: chronic off and on, flares with turning  herself in bed, stable today   Plan: continue current regime of weekly tramadol, acetaminophen, and topicals    (E11.42) Diabetic polyneuropathy associated with type 2 diabetes mellitus (H)  Comment: still struggles to use novolog regularly, eating is inconsistent   Plan: see PharmD note, no changes but encouraged to use novolog before meals    (G47.33) Obstructive sleep apnea syndrome  Comment: stable with CPAP  Plan: monitor     Patient Instructions   Radha will check on accessibility of the [facility]     Radha will check on stretcher transport    I will talk to Dr. Carlos about returning to him for primary care    We will see you in one month     Marivel Barcenas, ALISON, APRN [facility]    Visit time 60   min with > than 50% of the time spent in counseling on: care coordination, DM, kidney transplant, shoulder pain, obesity,

## 2017-11-01 NOTE — PATIENT INSTRUCTIONS
Radha will check on accessibility of the [facility]     Radha will check on stretcher transport    I will talk to Dr. Carlos abut returning to him for primary care    We will see you in one month

## 2017-11-01 NOTE — PROGRESS NOTES
"SUBJECTIVE/OBJECTIVE:                Gem Jade is a 71 year old female seen at their home for a follow-up visit for Medication Therapy Management.  She was referred to me from Marivel Barcenas. Seen as a covisit with PCP and Radha MICHAEL. Patient's  Keshawn also present.     Chief Complaint: Follow up from our visit on 9/28/17.  Discuss closure of Complex Care clinic  Tobacco: History of tobacco dependence - quit   Alcohol: not currently using    Medication Adherence: no issues found and has assistance setting up med boxes (Keshawn).     Back pain: has been working on core strength and sitting up in wheelchair but unfortunately injured her back. She is not currently able to sit up or incline bed without pain. Pain is relieved with lying position.    S/p kidney transplant: currently taking tacrolimus 3.5mg BID and prednisone 5mg daily. Labs recently completed. No side effects noted. Recently started abx for presumed UTI; see PCP note for details. Denies SE to abx.     Diabetes:  Pt currently taking Lantus 24 units daily, Novolog 10 units with meals 2-3 times a day and uses sliding scale 2 units for every 50 over 201. Has been missing doses of Novolog, not consistently using Novolog outside of dinner.   SMBG:  One to two times daily.   Ranges (glucometer)   Date FBG/ 2hours post Lunch/2hours post Dinner /2hours post    10/31   292/266    10/30   232       320            260  142       276       229       Symptoms of low blood sugar? \"life is leaving me\". Frequency of hypoglycemia? Rare, once recently was the first in many years  Recent symptoms of high blood sugar? No symptoms but BS higher with recent infection  Pt is not taking an ACEi/ARB. (Hx worsening renal function when on ACE/ARB therapy)  Aspirin: taking aspirin 81mg daily without problems  Diet/exercise: has been eating 3 times a day with Augmentin, snacks for two meals and one larger meal at dinner.     Current labs include:  BP Readings " from Last 3 Encounters:   10/04/17 135/70   09/28/17 132/78   09/06/17 120/50     Lab Results   Component Value Date    A1C 5.6 07/13/2017   .  Lab Results   Component Value Date    CHOL 164 12/05/2016     Lab Results   Component Value Date    TRIG 272 12/05/2016     Lab Results   Component Value Date    HDL 46 12/05/2016     Lab Results   Component Value Date    LDL 64 12/05/2016       Liver Function Studies -   Recent Labs   Lab Test  04/26/17   0638   PROTTOTAL  7.3   ALBUMIN  3.0*   BILITOTAL  0.6   ALKPHOS  72   AST  20   ALT  23       Lab Results   Component Value Date    UCRR 27 04/26/2017    MICROL 1850 04/26/2017    UMALCR 6776.56 (H) 04/26/2017       Last Basic Metabolic Panel:  Lab Results   Component Value Date     10/16/2017      Lab Results   Component Value Date    POTASSIUM 4.5 10/16/2017     Lab Results   Component Value Date    CHLORIDE 92 10/16/2017     Lab Results   Component Value Date    BUN 37 10/16/2017     Lab Results   Component Value Date    CR 1.49 10/25/2017     GFR Estimate   Date Value Ref Range Status   10/25/2017 35 (L) >60 mL/min/1.7m2 Final     Comment:     Non  GFR Calc   10/25/2017 Canceled, Test credited >60 mL/min/1.7m2 Final   10/16/2017 36 (L) >60 mL/min/1.7m2 Final     Comment:     Non  GFR Calc     GFR Estimate If Black   Date Value Ref Range Status   10/25/2017 42 (L) >60 mL/min/1.7m2 Final     Comment:      GFR Calc   10/25/2017 Canceled, Test credited >60 mL/min/1.7m2 Final   10/16/2017 44 (L) >60 mL/min/1.7m2 Final     Comment:      GFR Calc     TSH   Date Value Ref Range Status   03/09/2017 1.20 0.40 - 4.00 mU/L Final   ]    Most Recent Immunizations   Administered Date(s) Administered     HepB 01/13/2003     Influenza (H1N1) 11/25/2009     Influenza (High Dose) 3 valent vaccine 10/07/2016     Influenza (IIV3) 11/05/2011     Influenza Vaccine IM 3yrs+ 4 Valent IIV4 10/16/2013     Pneumococcal (PCV  13) 12/01/2015     Pneumococcal 23 valent 06/17/2013     TDAP Vaccine (Adacel) 06/17/2013     Zoster vaccine, live 06/17/2013       ASSESSMENT:              Current medications were reviewed today as discussed above.      Medication Adherence: needs improvement with Novolog    Checked medications for drug interactions, and no clinically significant interactions were found.    Back pain: needs improvement. Pt will continue to work on strengthening. No need to change pain medications at this time.   S/p kidney transplant: stable. Tac levels within range.  Continue to follow-up with transplant team.   Diabetes: needs improvement. A1c at goal <8%, however recently BS readings are elevated and frequently >200. Pt would benefit from more frequent testing and consistent use of Novolog TID with meals and snacks.      Discussed closure of Complex Care clinic on 12/31/17 and transition planning. Pt thinking she will return to Dr Carlos at [facility]. See SW notes for additional details.   Recommend pt have ongoing MTM services. If patient chooses [facility] clinic, I will be able to provide ongoing telephone support for MTM.     PLAN:                  Increase adherence to Novolog TID with meals and snacks    I spent 60 minutes with this patient today  . All changes were made via collaborative practice agreement with Marivel Barcenas. A copy of the visit note was provided to the patient's primary care provider.     Will follow up in 1 month.    The patient was mailed a summary of these recommendations as an after visit summary.    Conchita Toledo, PharmD, BCACP

## 2017-11-01 NOTE — PROGRESS NOTES
[facility] Clinic Transition Planning  Transition Plan Discussion:    Discussed the closure of the Complex Care Clinic on 12/31/17 with Shantelle and her  Keshawn.     Summary: The closest clinic to Shantelle is [facility], where she was previously seen by Lawson Carlos MD. She has great difficulty leaving her home and would only be able to be seen a couple times a year. She would benefit from virtual visits in between.     Clinic desired: [facility] considerations: Needs to be accessible for stretcher.   Length of appointment needed: Shantelle needs an extended appointment time.   Transportation: Stretcher transport.     Other services/providers involved in care: [facility].     Resources needed for the transition: Transportation.     Barriers: Shantelle has a new high-backed wheelchair but has not attempted to sit in it due to back pain. They have looked into stretcher transport in the past and have not been able to afford the fees.     Next steps: I reached out to the RN Care Coordinator, Alexus Bassett at [facility] to find out if they can accommodate stretcher transport. Alexus referred me to a clinic administrator, who will be working with Payal Haas to determine if that clinic can accommodate her. Klaudia Barcenas is scheduled to return on 12/6/17 for a routine primary care appointment. We will continue to discuss transition planning at that time. The tentative plan is for Shantelle to work towards increasing her strength in order to go in to the [facility] in Spring, 2018.     Routing to Alexus Bassett and Payal Haas as FYI.     Radha Batres, GENE, SW  Social Work Care Coordinator  [facility], Mobile Team  #[phone]

## 2017-11-01 NOTE — PROGRESS NOTES
Complex Care Clinic Transition Planning  Transition Plan Discussion:    Discussed the closure of the Complex Care Clinic on 12/31/17 with *** and ***.     Summary: ***    Clinic desired: ***  Clinic considerations: ***  Length of appointment needed: ***  Transportation: ***  Other people involved in care: ***    Other services/providers involved in care: ***    Resources needed for the transition: ***    Barriers: ***    Next steps: ***    ***

## 2017-11-01 NOTE — MR AVS SNAPSHOT
After Visit Summary   11/1/2017    Gem Jade    MRN: [id number]           Patient Information     Date Of Birth          1945        Visit Information        Provider Department      11/1/2017 2:00 PM Marivel Barcenas APRN CNP [facility]        Today's Diagnoses     Kidney replaced by transplant    -  1    Benign essential hypertension        Morbid obesity, unspecified obesity type (H)        Chronic shoulder pain, unspecified laterality        Diabetic polyneuropathy associated with type 2 diabetes mellitus (H)        Obstructive sleep apnea syndrome          Care Instructions    Radha will check on accessibility of the [facility]     Radha will check on stretcher transport    I will talk to Dr. Carlos abut returning to him           Follow-ups after your visit        Your next 10 appointments already scheduled     Dec 06, 2017  2:00 PM CST   Return Visit with ANTHONY Antonio CNP   [facility]    [address] [place] [address]  [address]  [facility] [address]   [phone]              Who to contact     If you have questions or need follow up information about today's clinic visit or your schedule please contact [facility] directly at [phone].  Normal or non-critical lab and imaging results will be communicated to you by MyChart, letter or phone within 4 business days after the clinic has received the results. If you do not hear from us within 7 days, please contact the clinic through MyChart or phone. If you have a critical or abnormal lab result, we will notify you by phone as soon as possible.  Submit refill requests through [employer] or call your pharmacy and they will forward the refill request to us. Please allow 3 business days for your refill to be completed.          Additional Information About Your Visit        MyChart Information     [employer] gives you secure  access to your electronic health record. If you see a primary care provider, you can also send messages to your care team and make appointments. If you have questions, please call your primary care clinic.  If you do not have a primary care provider, please call [phone] and they will assist you.        Care EveryWhere ID     This is your Care EveryWhere ID. This could be used by other organizations to access your [place] medical records  [id number]        Your Vitals Were     Pulse Pulse Oximetry                71 97%           Blood Pressure from Last 3 Encounters:   11/01/17 120/60   10/04/17 135/70   09/28/17 132/78    Weight from Last 3 Encounters:   04/27/17 199 lb 8 oz (90.5 kg)   03/15/17 201 lb (91.2 kg)   02/20/17 205 lb 4.8 oz (93.1 kg)              Today, you had the following     No orders found for display         Today's Medication Changes          These changes are accurate as of: 11/1/17 11:59 PM.  If you have any questions, ask your nurse or doctor.               These medicines have changed or have updated prescriptions.        Dose/Directions    TraMADol HCl 50 MG Tbdp   Indication:  Moderate to Moderately Severe Pain   This may have changed:  when to take this   Used for:  Chronic shoulder pain, unspecified laterality        Dose:  50 mg   Take 50 mg by mouth 3 times daily as needed   Quantity:  90 tablet   Refills:  3                Primary Care Provider Office Phone # Fax #    Marivel Noami Barcenas, APRN [facility] [phone]       [address] [facility] [address] [place] [address]        Equal Access to Services     CARLOS ENRIQUE GREEN AH: Hadii aad ku hadasho Soomaali, waaxda luqadaha, qaybta kaalmada adeegyada, jessica aguilar . So [facility] [phone].    ATENCIÓN: Si habla español, tiene a peguero disposición servicios gratuitos de asistencia lingüística. Llame al [phone].    We comply with applicable federal civil rights laws and [place] laws. We do not  discriminate on the basis of race, color, national origin, age, disability, sex, sexual orientation, or gender identity.            Thank you!     Thank you for choosing [facility]  for your care. Our goal is always to provide you with excellent care. Hearing back from our patients is one way we can continue to improve our services. Please take a few minutes to complete the written survey that you may receive in the mail after your visit with us. Thank you!             Your Updated Medication List - Protect others around you: Learn how to safely use, store and throw away your medicines at www.disposemymeds.org.          This list is accurate as of: 11/1/17 11:59 PM.  Always use your most recent med list.                   Brand Name Dispense Instructions for use Diagnosis    ACCU-CHEK COMPACT PLUS test strip   Generic drug:  blood glucose monitoring     100 strip    Use to test blood sugar 3 times daily or as directed.  Ok to substitute alternative if insurance prefers.        ACETAMINOPHEN PO      Take 325-650 mg by mouth every 4 hours as needed.        amoxicillin-clavulanate 500-125 MG per tablet    AUGMENTIN    42 tablet    Take 1 tablet by mouth 3 times daily    Dysuria, Urinary tract infection, Frequent UTI, Immunosuppressed status (H), Long-term use of immunosuppressant medication, Pyuria       aspirin 81 MG chewable tablet     90 tablet    Take 1 tablet (81 mg) by mouth daily Starting 1 week after your kidney biopsy    Pulmonary embolism and infarction (H), Diabetes mellitus with background retinopathy (H)       cinacalcet 30 MG tablet    SENSIPAR    60 tablet    Take 1 tablet (30 mg) by mouth daily    Kidney replaced by transplant, Hypercalcemia       cloNIDine 0.1 MG tablet    CATAPRES    180 tablet    TAKE ONE TABLET BY MOUTH TWICE A DAY    HTN, goal below 140/90       Cranberry 400 MG Tabs      Take 400 mg by mouth 2 times daily        cyanocobalamin 1000 MCG/ML injection    VITAMIN B12     3 mL    Inject 1 mL (1,000 mcg) into the muscle every 30 days    Iron deficiency anemia, unspecified iron deficiency anemia type       diphenoxylate-atropine 2.5-0.025 MG per tablet    LOMOTIL    50 tablet    Take 1 tablet by mouth 4 times daily as needed for diarrhea    Diarrhea of presumed infectious origin       famotidine 20 MG tablet    PEPCID    60 tablet    Take 1 tablet (20 mg) by mouth 2 times daily        gabapentin 300 MG capsule    NEURONTIN    180 capsule    Take 3 capsules (900 mg) by mouth 2 times daily For pain    Diabetic polyneuropathy associated with type 2 diabetes mellitus (H)       GLUCAGON EMERGENCY 1 MG kit   Generic drug:  glucagon     1 each    Inject 1 mg into the muscle once    Hypoglycemia       insulin aspart 100 UNIT/ML injection    NovoLOG FLEXPEN    15 mL    Inject 10 units under the skin 2 times daily before meals as directed. Add sliding scale as needed. Avg use 25 units per day    Type 2 diabetes mellitus with diabetic polyneuropathy, with long-term current use of insulin (H)       insulin glargine 100 UNIT/ML injection    LANTUS SOLOSTAR    15 mL    Inject 24 units under the skin daily.    Type 2 diabetes mellitus with diabetic polyneuropathy, with long-term current use of insulin (H)       insulin pen needle 30G X 8 MM     100 each    Use as directed with Lantus    Type 2 diabetes mellitus with diabetic nephropathy (H)       labetalol 300 MG tablet    NORMODYNE    120 tablet    Take 1/2 tablet (150mg) BID. Hold for systolic BP less than 120.    Benign essential hypertension, Kidney replaced by transplant       lactobacillus rhamnosus (GG) capsule     60 capsule    Take 1 capsule by mouth 2 times daily    Diarrhea       levothyroxine 50 MCG tablet    SYNTHROID    90 tablet    Take 1 tablet (50 mcg) by mouth daily    Other specified hypothyroidism       lidocaine 2 % topical gel    XYLOCAINE    30 mL    Apply topically as needed for moderate pain 10 ML every 3 weeks    Alex  catheter in place       loratadine 10 MG tablet    CLARITIN    90 tablet    Take 1 tablet (10 mg) by mouth daily    Acute nasopharyngitis       meclizine 12.5 MG tablet    ANTIVERT    90 tablet    Take 1 tablet (12.5 mg) by mouth 3 times daily as needed for dizziness    Disorientation       ondansetron 4 MG ODT tab    ZOFRAN ODT    20 tablet    Take 1-2 tablets (4-8 mg) by mouth every 8 hours as needed for nausea    Nausea       order for DME     3 catheter    Equipment being ordered: Johnson Catheters - 18 french    Johnson catheter status       * order for DME     100 pad    Equipment being ordered: Blue Chux [facility] send to [facility]    Fecal incontinence       * order for DME     1 each    Equipment being ordered: [employer] FX CPAP interface (nasal pillows), size XS.    Obstructive sleep apnea syndrome       * order for DME     12 each    Equipment being ordered: Silver Impregnated catheters HX of frequent UTI    Recurrent UTI       * order for DME     1 Device    High back guerrero wheel chair MICHELLE 99 DX: morbid obesity, generalized weakness, physical deconditioning, chronic vertigo    Morbid obesity, unspecified obesity type (H), Vertigo, Generalized muscle weakness, Physical deconditioning       pravastatin 10 MG tablet    PRAVACHOL    90 tablet    TAKE ONE TABLET BY MOUTH EVERY DAY    Hyperlipidemia LDL goal <100       predniSONE 5 MG tablet    DELTASONE    90 tablet    Take 1 tablet (5 mg) by mouth daily    Kidney replaced by transplant       simethicone 125 MG Chew chewable tablet    MYLICON    120 tablet    Take 1 tablet (125 mg) by mouth as needed for intestinal gas        * tacrolimus 1 MG capsule    GENERIC EQUIVALENT    180 capsule    Take 3 capsules (3 mg) by mouth 2 times daily (total dose 3.5 mg twice daily)    Kidney transplant recipient, Long-term use of immunosuppressant medication       * tacrolimus 0.5 MG capsule    GENERIC EQUIVALENT    60 capsule    Take 1 capsule (0.5 mg) by mouth 2 times daily  (total dose 3.5 mg twice daily)    Kidney transplant recipient       TraMADol HCl 50 MG Tbdp     90 tablet    Take 50 mg by mouth 3 times daily as needed    Chronic shoulder pain, unspecified laterality       TRILEPTAL 300 MG tablet   Generic drug:  OXcarbazepine     180 tablet    Take 1 tablet (300 mg) by mouth 2 times daily For pain    Diabetic polyneuropathy associated with diabetes mellitus due to underlying condition (H)       vitamin D 2000 UNITS tablet     100 tablet    Take 4,000 Units by mouth 2 times daily    Vitamin deficiency       * Notice:  This list has 6 medication(s) that are the same as other medications prescribed for you. Read the directions carefully, and ask your doctor or other care provider to review them with you.

## 2017-11-01 NOTE — MR AVS SNAPSHOT
After Visit Summary   11/1/2017    Gem Jade    MRN: [id number]           Patient Information     Date Of Birth          1945        Visit Information        Provider Department      11/1/2017 2:00 PM Conchita Toledo, [facility]        Today's Diagnoses     Type 2 diabetes mellitus with diabetic polyneuropathy, with long-term current use of insulin (H)    -  1    Kidney replaced by transplant        Back pain, unspecified back location, unspecified back pain laterality, unspecified chronicity          Care Instructions    See AVS from PCP encounter for details           Follow-ups after your visit        Your next 10 appointments already scheduled     Dec 06, 2017  2:00 PM CST   Return Visit with ANTHONY Antonio CNP   [facility]    [address] [place] [address]  [address] [place] [address] [phone]            Dec 06, 2017  2:00 PM CST   Office Visit with Conchita Toledo [facility] ([facility]    [address] [place] [address] [address] [place] [address] [phone]           Bring a current list of meds and any records pertaining to this visit. For Physicals, please bring immunization records and any forms needing to be filled out. Please arrive 10 minutes early to complete paperwork.              Who to contact     If you have questions or need follow up information about today's clinic visit or your schedule please contact [facility] directly at [phone].  Normal or non-critical lab and imaging results will be communicated to you by MyChart, letter or phone within 4 business days after the clinic has received the results. If you do not hear from us within 7 days, please contact the clinic through MyChart or phone. If you have a critical or abnormal lab result,  we will notify you by phone as soon as possible.  Submit refill requests through [employer] or call your pharmacy and they will forward the refill request to us. Please allow 3 business days for your refill to be completed.          Additional Information About Your Visit        [employer] Information     [employer] gives you secure access to your electronic health record. If you see a primary care provider, you can also send messages to your care team and make appointments. If you have questions, please call your primary care clinic.  If you do not have a primary care provider, please call [phone] and they will assist you.        Care EveryWhere ID     This is your Care EveryWhere ID. This could be used by other organizations to access your [place] medical records  [id number]         Blood Pressure from Last 3 Encounters:   11/09/17 163/56   11/01/17 120/60   10/04/17 135/70    Weight from Last 3 Encounters:   11/08/17 228 lb 2.8 oz (103.5 kg)   04/27/17 199 lb 8 oz (90.5 kg)   03/15/17 201 lb (91.2 kg)              Today, you had the following     No orders found for display         Today's Medication Changes          These changes are accurate as of: 11/1/17 11:59 PM.  If you have any questions, ask your nurse or doctor.               These medicines have changed or have updated prescriptions.        Dose/Directions    TraMADol HCl 50 MG Tbdp   Indication:  Moderate to Moderately Severe Pain   This may have changed:  when to take this   Used for:  Chronic shoulder pain, unspecified laterality        Dose:  50 mg   Take 50 mg by mouth 3 times daily as needed   Quantity:  90 tablet   Refills:  3                Primary Care Provider Office Phone # Fax #    ANTHONY Antonio [phone]       [address] [address]        Equal Access to Services     CARLOS ENRIQUE GREEN : Marques Goodman, madhav dai, jessica paz  sharri aguilar ah. So [facility] [phone].    ATENCIÓN: Si lacy alston, tiene a peguero disposición servicios gratuitos de asistencia lingüística. Carmine al [phone].    We comply with applicable federal civil rights laws and [place] laws. We do not discriminate on the basis of race, color, national origin, age, disability, sex, sexual orientation, or gender identity.            Thank you!     Thank you for choosing [facility] PRIMARY [facility]ARE [facility]  for your care. Our goal is always to provide you with excellent care. Hearing back from our patients is one way we can continue to improve our services. Please take a few minutes to complete the written survey that you may receive in the mail after your visit with us. Thank you!             Your Updated Medication List - Protect others around you: Learn how to safely use, store and throw away your medicines at www.disposemymeds.org.          This list is accurate as of: 11/1/17 11:59 PM.  Always use your most recent med list.                   Brand Name Dispense Instructions for use Diagnosis    ACCU-CHEK COMPACT PLUS test strip   Generic drug:  blood glucose monitoring     100 strip    Use to test blood sugar 3 times daily or as directed.  Ok to substitute alternative if insurance prefers.        ACETAMINOPHEN PO      Take 325-650 mg by mouth every 4 hours as needed.        amoxicillin-clavulanate 500-125 MG per tablet    AUGMENTIN    42 tablet    Take 1 tablet by mouth 3 times daily    Dysuria, Urinary tract infection, Frequent UTI, Immunosuppressed status (H), Long-term use of immunosuppressant medication, Pyuria       aspirin 81 MG chewable tablet     90 tablet    Take 1 tablet (81 mg) by mouth daily Starting 1 week after your kidney biopsy    Pulmonary embolism and infarction (H), Diabetes mellitus with background retinopathy (H)       cinacalcet 30 MG tablet    SENSIPAR    60 tablet    Take 1 tablet (30 mg) by mouth daily    Kidney replaced by  transplant, Hypercalcemia       cloNIDine 0.1 MG tablet    CATAPRES    180 tablet    TAKE ONE TABLET BY MOUTH TWICE A DAY    HTN, goal below 140/90       Cranberry 400 MG Tabs      Take 400 mg by mouth 2 times daily        cyanocobalamin 1000 MCG/ML injection    VITAMIN B12    3 mL    Inject 1 mL (1,000 mcg) into the muscle every 30 days    Iron deficiency anemia, unspecified iron deficiency anemia type       diphenoxylate-atropine 2.5-0.025 MG per tablet    LOMOTIL    50 tablet    Take 1 tablet by mouth 4 times daily as needed for diarrhea    Diarrhea of presumed infectious origin       famotidine 20 MG tablet    PEPCID    60 tablet    Take 1 tablet (20 mg) by mouth 2 times daily        gabapentin 300 MG capsule    NEURONTIN    180 capsule    Take 3 capsules (900 mg) by mouth 2 times daily For pain    Diabetic polyneuropathy associated with type 2 diabetes mellitus (H)       GLUCAGON EMERGENCY 1 MG kit   Generic drug:  glucagon     1 each    Inject 1 mg into the muscle once    Hypoglycemia       insulin aspart 100 UNIT/ML injection    NovoLOG FLEXPEN    15 mL    Inject 10 units under the skin 2 times daily before meals as directed. Add sliding scale as needed. Avg use 25 units per day    Type 2 diabetes mellitus with diabetic polyneuropathy, with long-term current use of insulin (H)       insulin glargine 100 UNIT/ML injection    LANTUS SOLOSTAR    15 mL    Inject 24 units under the skin daily.    Type 2 diabetes mellitus with diabetic polyneuropathy, with long-term current use of insulin (H)       insulin pen needle 30G X 8 MM     100 each    Use as directed with Lantus    Type 2 diabetes mellitus with diabetic nephropathy (H)       labetalol 300 MG tablet    NORMODYNE    120 tablet    Take 1/2 tablet (150mg) BID. Hold for systolic BP less than 120.    Benign essential hypertension, Kidney replaced by transplant       lactobacillus rhamnosus (GG) capsule     60 capsule    Take 1 capsule by mouth 2 times daily     Diarrhea       levothyroxine 50 MCG tablet    SYNTHROID    90 tablet    Take 1 tablet (50 mcg) by mouth daily    Other specified hypothyroidism       lidocaine 2 % topical gel    XYLOCAINE    30 mL    Apply topically as needed for moderate pain 10 ML every 3 weeks    Johnson catheter in place       loratadine 10 MG tablet    CLARITIN    90 tablet    Take 1 tablet (10 mg) by mouth daily    Acute nasopharyngitis       meclizine 12.5 MG tablet    ANTIVERT    90 tablet    Take 1 tablet (12.5 mg) by mouth 3 times daily as needed for dizziness    Disorientation       ondansetron 4 MG ODT tab    ZOFRAN ODT    20 tablet    Take 1-2 tablets (4-8 mg) by mouth every 8 hours as needed for nausea    Nausea       order for DME     3 catheter    Equipment being ordered: Johnson Catheters - 18 french    Johnson catheter status       * order for DME     100 pad    Equipment being ordered: Blue Chux Please send to [facility] medical    Fecal incontinence       * order for DME     1 each    Equipment being ordered: [employer] FX CPAP interface (nasal pillows), size XS.    Obstructive sleep apnea syndrome       * order for DME     12 each    Equipment being ordered: Silver Impregnated catheters HX of frequent UTI    Recurrent UTI       * order for DME     1 Device    High back guerrero wheel chair MICHELLE 99 DX: morbid obesity, generalized weakness, physical deconditioning, chronic vertigo    Morbid obesity, unspecified obesity type (H), Vertigo, Generalized muscle weakness, Physical deconditioning       pravastatin 10 MG tablet    PRAVACHOL    90 tablet    TAKE ONE TABLET BY MOUTH EVERY DAY    Hyperlipidemia LDL goal <100       predniSONE 5 MG tablet    DELTASONE    90 tablet    Take 1 tablet (5 mg) by mouth daily    Kidney replaced by transplant       simethicone 125 MG Chew chewable tablet    MYLICON    120 tablet    Take 1 tablet (125 mg) by mouth as needed for intestinal gas        * tacrolimus 1 MG capsule    GENERIC EQUIVALENT    180 capsule    Take  3 capsules (3 mg) by mouth 2 times daily (total dose 3.5 mg twice daily)    Kidney transplant recipient, Long-term use of immunosuppressant medication       * tacrolimus 0.5 MG capsule    GENERIC EQUIVALENT    60 capsule    Take 1 capsule (0.5 mg) by mouth 2 times daily (total dose 3.5 mg twice daily)    Kidney transplant recipient       TraMADol HCl 50 MG Tbdp     90 tablet    Take 50 mg by mouth 3 times daily as needed    Chronic shoulder pain, unspecified laterality       TRILEPTAL 300 MG tablet   Generic drug:  OXcarbazepine     180 tablet    Take 1 tablet (300 mg) by mouth 2 times daily For pain    Diabetic polyneuropathy associated with diabetes mellitus due to underlying condition (H)       vitamin D 2000 UNITS tablet     100 tablet    Take 4,000 Units by mouth 2 times daily    Vitamin deficiency       * Notice:  This list has 6 medication(s) that are the same as other medications prescribed for you. Read the directions carefully, and ask your doctor or other care provider to review them with you.

## 2017-11-03 ENCOUNTER — TELEPHONE (OUTPATIENT)
Dept: FAMILY MEDICINE | Facility: CLINIC | Age: 72
End: 2017-11-03

## 2017-11-03 DIAGNOSIS — E87.1 LOW SODIUM LEVELS: Primary | ICD-10-CM

## 2017-11-03 NOTE — TELEPHONE ENCOUNTER
"Pt's  called stating his wife \"isn't very alert.\"  didn't give any further info. He is requesting a call back.  "

## 2017-11-03 NOTE — TELEPHONE ENCOUNTER
Spoke with Keshawn, spouse who states patient finished her antibiotic this week for her UTI.  She received her flu shot yesterday and about 3-4 hours later she became very quiet and not as lucid.  She did respond and was able to follow instructions.  Today she remains not as lucid.  She is currently afebrile at 97.9.  Spouse very concerned.    Sent Lync to [facility] clinical coordinator to see if they would be able to have a nurse visit to assess patient but they do not.  They can have someone make a visit over the weekend.    Routing to provider to review.    Wendie Harris RN

## 2017-11-04 LAB
ANION GAP SERPL CALCULATED.3IONS-SCNC: 7 MMOL/L (ref 3–14)
BUN SERPL-MCNC: 43 MG/DL (ref 7–30)
CALCIUM SERPL-MCNC: 8.1 MG/DL (ref 8.5–10.1)
CHLORIDE SERPL-SCNC: 99 MMOL/L (ref 94–109)
CO2 SERPL-SCNC: 27 MMOL/L (ref 20–32)
CREAT SERPL-MCNC: 1.48 MG/DL (ref 0.52–1.04)
GFR SERPL CREATININE-BSD FRML MDRD: 35 ML/MIN/1.7M2
GLUCOSE SERPL-MCNC: 171 MG/DL (ref 70–99)
POTASSIUM SERPL-SCNC: 4.5 MMOL/L (ref 3.4–5.3)
SODIUM SERPL-SCNC: 133 MMOL/L (ref 133–144)

## 2017-11-04 PROCEDURE — 80048 BASIC METABOLIC PNL TOTAL CA: CPT

## 2017-11-04 NOTE — TELEPHONE ENCOUNTER
Will wait for RN assessment over the weekend, last UC did not grow out anything that needed to be treated, possible mild rxn to the flu vaccine.  Please call on Monday to assess status.  Will see if HC RN can repeat a BMP at weekend visit, last sodium was low, and we had patient decrease fluids     Klaudia Barcenas FNP      Culture Micro (Abnormal) 10/27/2017  2:25    50,000 to 100,000 colonies/mL   Candida albicans / dubliniensis   Candida albicans and Candida dubliniensis are not routinely speciated

## 2017-11-06 ENCOUNTER — APPOINTMENT (OUTPATIENT)
Dept: GENERAL RADIOLOGY | Facility: CLINIC | Age: 72
DRG: 698 | End: 2017-11-06
Attending: EMERGENCY MEDICINE
Payer: MEDICARE

## 2017-11-06 ENCOUNTER — HOSPITAL ENCOUNTER (INPATIENT)
Facility: CLINIC | Age: 72
LOS: 2 days | Discharge: HOME HEALTH | DRG: 698 | End: 2017-11-09
Attending: EMERGENCY MEDICINE | Admitting: INTERNAL MEDICINE
Payer: MEDICARE

## 2017-11-06 ENCOUNTER — APPOINTMENT (OUTPATIENT)
Dept: CT IMAGING | Facility: CLINIC | Age: 72
DRG: 698 | End: 2017-11-06
Attending: EMERGENCY MEDICINE
Payer: MEDICARE

## 2017-11-06 DIAGNOSIS — R19.7 DIARRHEA OF PRESUMED INFECTIOUS ORIGIN: ICD-10-CM

## 2017-11-06 DIAGNOSIS — N30.00 ACUTE CYSTITIS WITHOUT HEMATURIA: ICD-10-CM

## 2017-11-06 DIAGNOSIS — R41.0 DISORIENTATION: ICD-10-CM

## 2017-11-06 DIAGNOSIS — A41.9 SEPSIS, DUE TO UNSPECIFIED ORGANISM: ICD-10-CM

## 2017-11-06 LAB
ALBUMIN SERPL-MCNC: 3.1 G/DL (ref 3.4–5)
ALBUMIN UR-MCNC: 300 MG/DL
ALP SERPL-CCNC: 107 U/L (ref 40–150)
ALT SERPL W P-5'-P-CCNC: 24 U/L (ref 0–50)
ANION GAP SERPL CALCULATED.3IONS-SCNC: 9 MMOL/L (ref 3–14)
APPEARANCE UR: ABNORMAL
APTT PPP: 35 SEC (ref 22–37)
AST SERPL W P-5'-P-CCNC: 26 U/L (ref 0–45)
BACTERIA #/AREA URNS HPF: ABNORMAL /HPF
BASOPHILS # BLD AUTO: 0 10E9/L (ref 0–0.2)
BASOPHILS NFR BLD AUTO: 0.1 %
BILIRUB SERPL-MCNC: 0.5 MG/DL (ref 0.2–1.3)
BILIRUB UR QL STRIP: NEGATIVE
BUN SERPL-MCNC: 36 MG/DL (ref 7–30)
CALCIUM SERPL-MCNC: 8.8 MG/DL (ref 8.5–10.1)
CHLORIDE SERPL-SCNC: 102 MMOL/L (ref 94–109)
CO2 BLDCOV-SCNC: 30 MMOL/L (ref 21–28)
CO2 SERPL-SCNC: 24 MMOL/L (ref 20–32)
COLOR UR AUTO: YELLOW
CREAT SERPL-MCNC: 1.25 MG/DL (ref 0.52–1.04)
DIFFERENTIAL METHOD BLD: ABNORMAL
EOSINOPHIL # BLD AUTO: 0 10E9/L (ref 0–0.7)
EOSINOPHIL NFR BLD AUTO: 0.1 %
ERYTHROCYTE [DISTWIDTH] IN BLOOD BY AUTOMATED COUNT: 13.7 % (ref 10–15)
GFR SERPL CREATININE-BSD FRML MDRD: 42 ML/MIN/1.7M2
GLUCOSE SERPL-MCNC: 272 MG/DL (ref 70–99)
GLUCOSE UR STRIP-MCNC: 300 MG/DL
HCT VFR BLD AUTO: 40.2 % (ref 35–47)
HGB BLD-MCNC: 12.7 G/DL (ref 11.7–15.7)
HGB UR QL STRIP: ABNORMAL
IMM GRANULOCYTES # BLD: 0 10E9/L (ref 0–0.4)
IMM GRANULOCYTES NFR BLD: 0.3 %
INR PPP: 1.09 (ref 0.86–1.14)
KETONES UR STRIP-MCNC: 10 MG/DL
LACTATE BLD-SCNC: 1.3 MMOL/L (ref 0.7–2.1)
LACTATE BLD-SCNC: 1.5 MMOL/L (ref 0.7–2)
LEUKOCYTE ESTERASE UR QL STRIP: ABNORMAL
LIPASE SERPL-CCNC: 99 U/L (ref 73–393)
LYMPHOCYTES # BLD AUTO: 0.9 10E9/L (ref 0.8–5.3)
LYMPHOCYTES NFR BLD AUTO: 6.6 %
MCH RBC QN AUTO: 30.4 PG (ref 26.5–33)
MCHC RBC AUTO-ENTMCNC: 31.6 G/DL (ref 31.5–36.5)
MCV RBC AUTO: 96 FL (ref 78–100)
MONOCYTES # BLD AUTO: 0.8 10E9/L (ref 0–1.3)
MONOCYTES NFR BLD AUTO: 6.2 %
NEUTROPHILS # BLD AUTO: 11.7 10E9/L (ref 1.6–8.3)
NEUTROPHILS NFR BLD AUTO: 86.7 %
NITRATE UR QL: POSITIVE
NRBC # BLD AUTO: 0 10*3/UL
NRBC BLD AUTO-RTO: 0 /100
PCO2 BLDV: 47 MM HG (ref 40–50)
PH BLDV: 7.41 PH (ref 7.32–7.43)
PH UR STRIP: 6.5 PH (ref 5–7)
PLATELET # BLD AUTO: 171 10E9/L (ref 150–450)
PO2 BLDV: 37 MM HG (ref 25–47)
POTASSIUM SERPL-SCNC: 4.9 MMOL/L (ref 3.4–5.3)
PROT SERPL-MCNC: 9 G/DL (ref 6.8–8.8)
RBC # BLD AUTO: 4.18 10E12/L (ref 3.8–5.2)
RBC #/AREA URNS AUTO: 111 /HPF (ref 0–2)
SAO2 % BLDV FROM PO2: 70 %
SODIUM SERPL-SCNC: 135 MMOL/L (ref 133–144)
SOURCE: ABNORMAL
SP GR UR STRIP: 1.01 (ref 1–1.03)
SQUAMOUS #/AREA URNS AUTO: <1 /HPF (ref 0–1)
TRANS CELLS #/AREA URNS HPF: 1 /HPF (ref 0–1)
UROBILINOGEN UR STRIP-MCNC: NORMAL MG/DL (ref 0–2)
WBC # BLD AUTO: 13.5 10E9/L (ref 4–11)
WBC #/AREA URNS AUTO: 685 /HPF (ref 0–2)
WBC CLUMPS #/AREA URNS HPF: PRESENT /HPF
YEAST #/AREA URNS HPF: ABNORMAL /HPF

## 2017-11-06 PROCEDURE — 87088 URINE BACTERIA CULTURE: CPT | Performed by: EMERGENCY MEDICINE

## 2017-11-06 PROCEDURE — 87186 SC STD MICRODIL/AGAR DIL: CPT | Performed by: EMERGENCY MEDICINE

## 2017-11-06 PROCEDURE — 99223 1ST HOSP IP/OBS HIGH 75: CPT | Mod: AI | Performed by: INTERNAL MEDICINE

## 2017-11-06 PROCEDURE — 82803 BLOOD GASES ANY COMBINATION: CPT

## 2017-11-06 PROCEDURE — 83605 ASSAY OF LACTIC ACID: CPT

## 2017-11-06 PROCEDURE — 87040 BLOOD CULTURE FOR BACTERIA: CPT | Performed by: EMERGENCY MEDICINE

## 2017-11-06 PROCEDURE — 87086 URINE CULTURE/COLONY COUNT: CPT | Performed by: EMERGENCY MEDICINE

## 2017-11-06 PROCEDURE — 25000128 H RX IP 250 OP 636

## 2017-11-06 PROCEDURE — 96361 HYDRATE IV INFUSION ADD-ON: CPT | Performed by: EMERGENCY MEDICINE

## 2017-11-06 PROCEDURE — 83605 ASSAY OF LACTIC ACID: CPT | Performed by: EMERGENCY MEDICINE

## 2017-11-06 PROCEDURE — 99285 EMERGENCY DEPT VISIT HI MDM: CPT | Mod: 25 | Performed by: EMERGENCY MEDICINE

## 2017-11-06 PROCEDURE — 96375 TX/PRO/DX INJ NEW DRUG ADDON: CPT | Performed by: EMERGENCY MEDICINE

## 2017-11-06 PROCEDURE — 96365 THER/PROPH/DIAG IV INF INIT: CPT | Performed by: EMERGENCY MEDICINE

## 2017-11-06 PROCEDURE — 00000146 ZZHCL STATISTIC GLUCOSE BY METER IP

## 2017-11-06 PROCEDURE — 70450 CT HEAD/BRAIN W/O DYE: CPT

## 2017-11-06 PROCEDURE — 99291 CRITICAL CARE FIRST HOUR: CPT | Mod: Z6 | Performed by: EMERGENCY MEDICINE

## 2017-11-06 PROCEDURE — 25000128 H RX IP 250 OP 636: Performed by: EMERGENCY MEDICINE

## 2017-11-06 PROCEDURE — 85730 THROMBOPLASTIN TIME PARTIAL: CPT | Performed by: EMERGENCY MEDICINE

## 2017-11-06 PROCEDURE — 85025 COMPLETE CBC W/AUTO DIFF WBC: CPT | Performed by: EMERGENCY MEDICINE

## 2017-11-06 PROCEDURE — 80053 COMPREHEN METABOLIC PANEL: CPT | Performed by: EMERGENCY MEDICINE

## 2017-11-06 PROCEDURE — 96366 THER/PROPH/DIAG IV INF ADDON: CPT | Performed by: EMERGENCY MEDICINE

## 2017-11-06 PROCEDURE — 81001 URINALYSIS AUTO W/SCOPE: CPT | Performed by: EMERGENCY MEDICINE

## 2017-11-06 PROCEDURE — 85610 PROTHROMBIN TIME: CPT | Performed by: EMERGENCY MEDICINE

## 2017-11-06 PROCEDURE — 96367 TX/PROPH/DG ADDL SEQ IV INF: CPT | Performed by: EMERGENCY MEDICINE

## 2017-11-06 PROCEDURE — 71020 XR CHEST 2 VW: CPT

## 2017-11-06 PROCEDURE — 83690 ASSAY OF LIPASE: CPT | Performed by: EMERGENCY MEDICINE

## 2017-11-06 RX ORDER — HYDRALAZINE HYDROCHLORIDE 20 MG/ML
10 INJECTION INTRAMUSCULAR; INTRAVENOUS EVERY 6 HOURS PRN
Status: DISCONTINUED | OUTPATIENT
Start: 2017-11-06 | End: 2017-11-09 | Stop reason: HOSPADM

## 2017-11-06 RX ORDER — LABETALOL HYDROCHLORIDE 5 MG/ML
10 INJECTION, SOLUTION INTRAVENOUS ONCE
Status: DISCONTINUED | OUTPATIENT
Start: 2017-11-06 | End: 2017-11-06

## 2017-11-06 RX ORDER — PIPERACILLIN SODIUM, TAZOBACTAM SODIUM 3; .375 G/15ML; G/15ML
3.38 INJECTION, POWDER, LYOPHILIZED, FOR SOLUTION INTRAVENOUS ONCE
Status: COMPLETED | OUTPATIENT
Start: 2017-11-06 | End: 2017-11-06

## 2017-11-06 RX ORDER — DEXTROSE MONOHYDRATE, SODIUM CHLORIDE, AND POTASSIUM CHLORIDE 50; .745; 4.5 G/1000ML; G/1000ML; G/1000ML
INJECTION, SOLUTION INTRAVENOUS CONTINUOUS
Status: DISCONTINUED | OUTPATIENT
Start: 2017-11-06 | End: 2017-11-07

## 2017-11-06 RX ORDER — HYDRALAZINE HYDROCHLORIDE 20 MG/ML
10 INJECTION INTRAMUSCULAR; INTRAVENOUS ONCE
Status: COMPLETED | OUTPATIENT
Start: 2017-11-06 | End: 2017-11-06

## 2017-11-06 RX ORDER — DEXTROSE MONOHYDRATE 25 G/50ML
25-50 INJECTION, SOLUTION INTRAVENOUS
Status: DISCONTINUED | OUTPATIENT
Start: 2017-11-06 | End: 2017-11-07

## 2017-11-06 RX ORDER — PIPERACILLIN SODIUM, TAZOBACTAM SODIUM 4; .5 G/20ML; G/20ML
4.5 INJECTION, POWDER, LYOPHILIZED, FOR SOLUTION INTRAVENOUS EVERY 6 HOURS
Status: DISCONTINUED | OUTPATIENT
Start: 2017-11-06 | End: 2017-11-08

## 2017-11-06 RX ORDER — NICOTINE POLACRILEX 4 MG
15-30 LOZENGE BUCCAL
Status: DISCONTINUED | OUTPATIENT
Start: 2017-11-06 | End: 2017-11-07

## 2017-11-06 RX ADMIN — SODIUM CHLORIDE 1000 ML: 900 INJECTION, SOLUTION INTRAVENOUS at 17:21

## 2017-11-06 RX ADMIN — VANCOMYCIN HYDROCHLORIDE 2250 MG: 10 INJECTION, POWDER, LYOPHILIZED, FOR SOLUTION INTRAVENOUS at 19:08

## 2017-11-06 RX ADMIN — SODIUM CHLORIDE 1000 ML: 900 INJECTION, SOLUTION INTRAVENOUS at 19:08

## 2017-11-06 RX ADMIN — HYDRALAZINE HYDROCHLORIDE 10 MG: 20 INJECTION INTRAMUSCULAR; INTRAVENOUS at 18:28

## 2017-11-06 RX ADMIN — PIPERACILLIN SODIUM AND TAZOBACTAM SODIUM 3.38 G: .375; 3 INJECTION, POWDER, LYOPHILIZED, FOR SOLUTION INTRAVENOUS at 18:06

## 2017-11-06 NOTE — ED NOTES
Patient from home where she lives with her  and receives care from PCAs that visit their home. Patient is normally alert, oriented and speaks without difficulty, but is wheelchair bound at baseline. She was noted to be at her baseline on Friday and has declined since. On arrival to ED, she is alert, but is unable to speak. She occasionally is able to shake her head yes or no to questions, but not consistently. Johnson in place at time of arrival to ED. Patient recently treated for UTI and reportedly finished her course of antibiotics.

## 2017-11-06 NOTE — TELEPHONE ENCOUNTER
Spoke with SEKOU Chandler [facility] who states at her visit over the weekend patient was alert and lucid.  She did not see anything alarming with patients status.    Routing to provider as FYI.    Wendie Harris RN

## 2017-11-06 NOTE — ED NOTES
Bed: ED22  Expected date:   Expected time:   Means of arrival:   Comments:  71 F, altered mental status  Last known well was Saturday night.  Pt is not able to speak but can nod her head y/n to answer questions.  Pt has a recent cough and was treated for a UTI.  Pt is a kidney transplant pt. SBP's are between 150-200.

## 2017-11-06 NOTE — IP AVS SNAPSHOT
MRN:[id number]                      After Visit Summary   11/6/2017    Gem Jade    MRN: [id number]           Thank you!     Thank you for choosing [facility] for your care. Our goal is always to provide you with excellent care. Hearing back from our patients is one way we can continue to improve our services. Please take a few minutes to complete the written survey that you may receive in the mail after you visit with us. Thank you!        Patient Information     Date Of Birth          1945        Designated Caregiver       Most Recent Value    Caregiver    Will someone help with your care after discharge? yes    Name of designated caregiver Keshawn    Phone number of caregiver [phone]    Caregiver address [address] [place] [address]      About your hospital stay     You were admitted on:  November 7, 2017 You last received care in the:  [address] [place] [address]    You were discharged on:  November 9, 2017        Reason for your hospital stay       You were admitted with a catheter associated urinary tract infection.  You were started on antibiotics and should continue these antibiotics for 7 more days.  Follow up with your home care provider.                  Who to Call     For medical emergencies, please call 911.  For non-urgent questions about your medical care, please call your primary care provider or clinic, [phone]          Attending Provider     Provider Specialty    Pawan Grossman MD Emergency Medicine    [facility], Sravani Daniels MD Internal Medicine    [facility], Christiano Pak MD Internal Medicine       Primary Care Provider Office Phone # Fax #    Marivel ANTHONY Gr[facility] [phone]      After Care Instructions     Activity       Your activity upon discharge: activity as tolerated            Diet       Follow this diet upon discharge:       Regular Diet Adult                  Follow-up Appointments     Adult [facility] Follow-up and  recommended labs and tests       Follow up with primary care provider, Marivel Barcenas NP, within 7 days for hospital follow- up.  The following labs/tests are recommended: BMP, Mg, Phos.      Appointments on [place] and/or [facility] (with [facility] or [facility] provider or service). Call [phone] if you haven't heard regarding these appointments within 7 days of discharge.                  Your next 10 appointments already scheduled     Dec 06, 2017  2:00 PM CST   Return Visit with ANTHONY [facility] ([facility])    [address] [place] [address]  [address]  [facility] [address] [phone]            Dec 06, 2017  2:00 PM CST   Office Visit with Conchita [facility] ([facility])    [address] [place] [address]  [address] [place] [address] [phone]           Bring a current list of meds and any records pertaining to this visit. For Physicals, please bring immunization records and any forms needing to be filled out. Please arrive 10 minutes early to complete paperwork.              Additional Services     Home care nursing referral       [facility]  Phone  [phone]  Fax  [phone]    Resumption of Skilled Nursing and Home Health Aide Services    Your provider has ordered home care nursing services.   If you have not been contacted within 2 days of your discharge please call the inpatient department phone number at [phone] .                  Further instructions from your care team       Critical access hospital Home Based Medicine  Referral: [phone]  Clinic: [phone]    Pending Results     Date and Time Order Name Status Description    11/6/2017 1655 Blood culture Preliminary     11/6/2017 1655 Blood culture Preliminary             Statement of Approval     Ordered          11/09/17 1439  I have reviewed and agree with all the  "recommendations and orders detailed in this document.  EFFECTIVE NOW     Approved and electronically signed by:  Brittani Ortiz MD             Admission Information     Date & Time Provider Department Dept. Phone    11/6/2017 Christiano Webber MD [address] [facility] [phone]      Your Vitals Were     Blood Pressure Pulse Temperature Respirations Height Weight    163/56 (BP Location: Right arm) 81 97.5  F (36.4  C) (Oral) 16 1.676 m (5' 6\") 103.5 kg (228 lb 2.8 oz)    Pulse Oximetry BMI (Body Mass Index)                98% 36.83 kg/m2          MyChart Information     [facility] gives you secure access to your electronic health record. If you see a primary care provider, you can also send messages to your care team and make appointments. If you have questions, please call your primary care clinic.  If you do not have a primary care provider, please call [phone] and they will assist you.        Care EveryWhere ID     This is your Care EveryWhere ID. This could be used by other organizations to access your [facility] medical records  [id number]        Equal Access to Services     KYLAH GREEN AH: Hadjb Goodman, madhav dai, jessica paz. So [facility] [phone].    ATENCIÓN: Si habla español, tiene a peguero disposición servicios gratuitos de asistencia lingüística. Carmine al [phone].    We comply with applicable federal civil rights laws and [place] laws. We do not discriminate on the basis of race, color, national origin, age, disability, sex, sexual orientation, or gender identity.               Review of your medicines      START taking        Dose / Directions    ciprofloxacin 500 MG tablet   Commonly known as:  CIPRO   Indication:  Urinary Tract Infection   Used for:  Acute cystitis without hematuria        Dose:  500 mg   Take 1 tablet (500 mg) by mouth every 12 hours for 7 days   Quantity:  14 tablet   Refills:  0       "   CONTINUE these medicines which may have CHANGED, or have new prescriptions. If we are uncertain of the size of tablets/capsules you have at home, strength may be listed as something that might have changed.        Dose / Directions    TraMADol HCl 50 MG Tbdp   Indication:  Moderate to Moderately Severe Pain   This may have changed:  when to take this   Used for:  Chronic shoulder pain, unspecified laterality        Dose:  50 mg   Take 50 mg by mouth 3 times daily as needed   Quantity:  90 tablet   Refills:  3         CONTINUE these medicines which have NOT CHANGED        Dose / Directions    ACCU-CHEK COMPACT PLUS test strip   Generic drug:  blood glucose monitoring        Use to test blood sugar 3 times daily or as directed.  Ok to substitute alternative if insurance prefers.   Quantity:  100 strip   Refills:  prn       ACETAMINOPHEN PO        Dose:  325-650 mg   Take 325-650 mg by mouth every 4 hours as needed.   Refills:  0       aspirin 81 MG chewable tablet   Used for:  Pulmonary embolism and infarction (H), Diabetes mellitus with background retinopathy (H)        Dose:  81 mg   Take 1 tablet (81 mg) by mouth daily Starting 1 week after your kidney biopsy   Quantity:  90 tablet   Refills:  3       cinacalcet 30 MG tablet   Commonly known as:  SENSIPAR   Used for:  Kidney replaced by transplant, Hypercalcemia        Dose:  30 mg   Take 1 tablet (30 mg) by mouth daily   Quantity:  60 tablet   Refills:  3       cloNIDine 0.1 MG tablet   Commonly known as:  CATAPRES   Used for:  HTN, goal below 140/90        TAKE ONE TABLET BY MOUTH TWICE A DAY   Quantity:  180 tablet   Refills:  3       Cranberry 400 MG Tabs        Dose:  400 mg   Take 400 mg by mouth 2 times daily   Refills:  0       cyanocobalamin 1000 MCG/ML injection   Commonly known as:  VITAMIN B12   Used for:  Iron deficiency anemia, unspecified iron deficiency anemia type        Dose:  1000 mcg   Inject 1 mL (1,000 mcg) into the muscle every 30 days    Quantity:  3 mL   Refills:  3       diphenoxylate-atropine 2.5-0.025 MG per tablet   Commonly known as:  LOMOTIL   Used for:  Diarrhea of presumed infectious origin        Dose:  1 tablet   Take 1 tablet by mouth 4 times daily as needed for diarrhea   Quantity:  30 tablet   Refills:  0       famotidine 20 MG tablet   Commonly known as:  PEPCID        Dose:  20 mg   Take 1 tablet (20 mg) by mouth 2 times daily   Quantity:  60 tablet   Refills:  1       gabapentin 300 MG capsule   Commonly known as:  NEURONTIN   Used for:  Diabetic polyneuropathy associated with type 2 diabetes mellitus (H)        Dose:  900 mg   Take 3 capsules (900 mg) by mouth 2 times daily For pain   Quantity:  180 capsule   Refills:  3       GLUCAGON EMERGENCY 1 MG kit   Used for:  Hypoglycemia   Generic drug:  glucagon        Dose:  1 mg   Inject 1 mg into the muscle once   Quantity:  1 each   Refills:  11       insulin aspart 100 UNIT/ML injection   Commonly known as:  NovoLOG FLEXPEN   Used for:  Type 2 diabetes mellitus with diabetic polyneuropathy, with long-term current use of insulin (H)        Inject 10 units under the skin 2 times daily before meals as directed. Add sliding scale as needed. Avg use 25 units per day   Quantity:  15 mL   Refills:  11       insulin glargine 100 UNIT/ML injection   Commonly known as:  LANTUS SOLOSTAR   Used for:  Type 2 diabetes mellitus with diabetic polyneuropathy, with long-term current use of insulin (H)        Inject 24 units under the skin daily.   Quantity:  15 mL   Refills:  11       insulin pen needle 30G X 8 MM   Used for:  Type 2 diabetes mellitus with diabetic nephropathy (H)        Use as directed with Lantus   Quantity:  100 each   Refills:  3       labetalol 300 MG tablet   Commonly known as:  NORMODYNE   Used for:  Benign essential hypertension, Kidney replaced by transplant        Take 1/2 tablet (150mg) BID. Hold for systolic BP less than 120.   Quantity:  120 tablet   Refills:  3        lactobacillus rhamnosus (GG) capsule   Used for:  Diarrhea        Dose:  1 capsule   Take 1 capsule by mouth 2 times daily   Quantity:  60 capsule   Refills:  11       levothyroxine 50 MCG tablet   Commonly known as:  SYNTHROID   Used for:  Other specified hypothyroidism        Dose:  50 mcg   Take 1 tablet (50 mcg) by mouth daily   Quantity:  90 tablet   Refills:  3       lidocaine 2 % topical gel   Commonly known as:  XYLOCAINE   Used for:  Johnson catheter in place        Apply topically as needed for moderate pain 10 ML every 3 weeks   Quantity:  30 mL   Refills:  11       loratadine 10 MG tablet   Commonly known as:  CLARITIN   Used for:  Acute nasopharyngitis        Dose:  10 mg   Take 1 tablet (10 mg) by mouth daily   Quantity:  90 tablet   Refills:  3       meclizine 12.5 MG tablet   Commonly known as:  ANTIVERT   Used for:  Disorientation        Dose:  12.5 mg   Take 1 tablet (12.5 mg) by mouth 3 times daily as needed for dizziness   Quantity:  30 tablet   Refills:  0       ondansetron 4 MG ODT tab   Commonly known as:  ZOFRAN ODT   Used for:  Nausea        Dose:  4-8 mg   Take 1-2 tablets (4-8 mg) by mouth every 8 hours as needed for nausea   Quantity:  20 tablet   Refills:  5       order for DME   Used for:  Johnson catheter status        Equipment being ordered: Johnson Catheters - 18 french   Quantity:  3 catheter   Refills:  PRN       * order for DME   Used for:  Fecal incontinence        Equipment being ordered: Blue Chux Please send to [facility] medical   Quantity:  100 pad   Refills:  3       * order for DME   Used for:  Obstructive sleep apnea syndrome        Equipment being ordered: Ahuja FX CPAP interface (nasal pillows), size XS.   Quantity:  1 each   Refills:  1       * order for DME   Used for:  Recurrent UTI        Equipment being ordered: Silver Impregnated catheters HX of frequent UTI   Quantity:  12 each   Refills:  1       * order for DME   Used for:  Morbid obesity, unspecified obesity type (H),  Vertigo, Generalized muscle weakness, Physical deconditioning        High back guerrero wheel chair MICHELLE 99 DX: morbid obesity, generalized weakness, physical deconditioning, chronic vertigo   Quantity:  1 Device   Refills:  0       pravastatin 10 MG tablet   Commonly known as:  PRAVACHOL   Used for:  Hyperlipidemia LDL goal <100        TAKE ONE TABLET BY MOUTH EVERY DAY   Quantity:  90 tablet   Refills:  3       predniSONE 5 MG tablet   Commonly known as:  DELTASONE   Used for:  Kidney replaced by transplant        Dose:  5 mg   Take 1 tablet (5 mg) by mouth daily   Quantity:  90 tablet   Refills:  3       simethicone 125 MG Chew chewable tablet   Commonly known as:  MYLICON        Dose:  125 mg   Take 1 tablet (125 mg) by mouth as needed for intestinal gas   Quantity:  120 tablet   Refills:  0       * tacrolimus 1 MG capsule   Commonly known as:  GENERIC EQUIVALENT   Used for:  Kidney transplant recipient, Long-term use of immunosuppressant medication        Dose:  3 mg   Take 3 capsules (3 mg) by mouth 2 times daily (total dose 3.5 mg twice daily)   Quantity:  180 capsule   Refills:  3       * tacrolimus 0.5 MG capsule   Commonly known as:  GENERIC EQUIVALENT   Used for:  Kidney transplant recipient        Dose:  0.5 mg   Take 1 capsule (0.5 mg) by mouth 2 times daily (total dose 3.5 mg twice daily)   Quantity:  60 capsule   Refills:  3       TRILEPTAL 300 MG tablet   Used for:  Diabetic polyneuropathy associated with diabetes mellitus due to underlying condition (H)   Generic drug:  OXcarbazepine        Dose:  300 mg   Take 1 tablet (300 mg) by mouth 2 times daily For pain   Quantity:  180 tablet   Refills:  3       vitamin D 2000 UNITS tablet   Used for:  Vitamin deficiency        Dose:  4000 Units   Take 4,000 Units by mouth 2 times daily   Quantity:  100 tablet   Refills:  3       * Notice:  This list has 6 medication(s) that are the same as other medications prescribed for you. Read the directions carefully,  and ask your doctor or other care provider to review them with you.      STOP taking     amoxicillin-clavulanate 500-125 MG per tablet   Commonly known as:  AUGMENTIN                Where to get your medicines      These medications were sent to [facility] - [place], [place] - [address]  [address], [facility] [address]     Phone:  [phone]     ciprofloxacin 500 MG tablet    meclizine 12.5 MG tablet         Some of these will need a paper prescription and others can be bought over the counter. Ask your nurse if you have questions.     Bring a paper prescription for each of these medications     diphenoxylate-atropine 2.5-0.025 MG per tablet               ANTIBIOTIC INSTRUCTION     You've Been Prescribed an Antibiotic - Now What?  Your healthcare team thinks that you or your loved one might have an infection. Some infections can be treated with antibiotics, which are powerful, life-saving drugs. Like all medications, antibiotics have side effects and should only be used when necessary. There are some important things you should know about your antibiotic treatment.      Your healthcare team may run tests before you start taking an antibiotic.    Your team may take samples (e.g., from your blood, urine or other areas) to run tests to look for bacteria. These test can be important to determine if you need an antibiotic at all and, if you do, which antibiotic will work best.      Within a few days, your healthcare team might change or even stop your antibiotic.    Your team may start you on an antibiotic while they are working to find out what is making you sick.    Your team might change your antibiotic because test results show that a different antibiotic would be better to treat your infection.    In some cases, once your team has more information, they learn that you do not need an antibiotic at all. They may find out that you don't have an infection, or that the antibiotic  you're taking won't work against your infection. For example, an infection caused by a virus can't be treated with antibiotics. Staying on an antibiotic when you don't need it is more likely to be harmful than helpful.      You may experience side effects from your antibiotic.    Like all medications, antibiotics have side effects. Some of these can be serious.    Let you healthcare team know if you have any known allergies when you are admitted to the hospital.    One significant side effect of nearly all antibiotics is the risk of severe and sometimes deadly diarrhea caused by Clostridium difficile (C. Difficile). This occurs when a person takes antibiotics because some good germs are destroyed. Antibiotic use allows C. diificile to take over, putting patients at high risk for this serious infection.    As a patient or caregiver, it is important to understand your or your loved one's antibiotic treatment. It is especially important for caregivers to speak up when patients can't speak for themselves. Here are some important questions to ask your healthcare team.    What infection is this antibiotic treating and how do you know I have that infection?    What side effects might occur from this antibiotic?    How long will I need to take this antibiotic?    Is it safe to take this antibiotic with other medications or supplements (e.g., vitamins) that I am taking?     Are there any special directions I need to know about taking this antibiotic? For example, should I take it with food?    How will I be monitored to know whether my infection is responding to the antibiotic?    What tests may help to make sure the right antibiotic is prescribed for me?      Information provided by:  www.cdc.gov/getsmart  U.S. Department of Health and Human Services  Centers for disease Control and Prevention  National Center for Emerging and Zoonotic Infectious Diseases  Division of Healthcare Quality Promotion         Protect others  around you: Learn how to safely use, store and throw away your medicines at www.disposemymeds.org.             Medication List: This is a list of all your medications and when to take them. Check marks below indicate your daily home schedule. Keep this list as a reference.      Medications           Morning Afternoon Evening Bedtime As Needed    ACCU-CHEK COMPACT PLUS test strip   Use to test blood sugar 3 times daily or as directed.  Ok to substitute alternative if insurance prefers.   Generic drug:  blood glucose monitoring                                ACETAMINOPHEN PO   Take 325-650 mg by mouth every 4 hours as needed.   Last time this was given:  650 mg on 11/7/2017  8:55 PM                                aspirin 81 MG chewable tablet   Take 1 tablet (81 mg) by mouth daily Starting 1 week after your kidney biopsy   Last time this was given:  81 mg on 11/9/2017  8:34 AM                                cinacalcet 30 MG tablet   Commonly known as:  SENSIPAR   Take 1 tablet (30 mg) by mouth daily   Last time this was given:  30 mg on 11/9/2017  8:34 AM                                ciprofloxacin 500 MG tablet   Commonly known as:  CIPRO   Take 1 tablet (500 mg) by mouth every 12 hours for 7 days   Last time this was given:  500 mg on 11/9/2017  8:34 AM                                cloNIDine 0.1 MG tablet   Commonly known as:  CATAPRES   TAKE ONE TABLET BY MOUTH TWICE A DAY   Last time this was given:  0.1 mg on 11/9/2017  8:37 AM                                Cranberry 400 MG Tabs   Take 400 mg by mouth 2 times daily                                cyanocobalamin 1000 MCG/ML injection   Commonly known as:  VITAMIN B12   Inject 1 mL (1,000 mcg) into the muscle every 30 days                                diphenoxylate-atropine 2.5-0.025 MG per tablet   Commonly known as:  LOMOTIL   Take 1 tablet by mouth 4 times daily as needed for diarrhea                                famotidine 20 MG tablet   Commonly known  as:  PEPCID   Take 1 tablet (20 mg) by mouth 2 times daily   Last time this was given:  20 mg on 11/9/2017  8:37 AM                                gabapentin 300 MG capsule   Commonly known as:  NEURONTIN   Take 3 capsules (900 mg) by mouth 2 times daily For pain                                GLUCAGON EMERGENCY 1 MG kit   Inject 1 mg into the muscle once   Generic drug:  glucagon                                insulin aspart 100 UNIT/ML injection   Commonly known as:  NovoLOG FLEXPEN   Inject 10 units under the skin 2 times daily before meals as directed. Add sliding scale as needed. Avg use 25 units per day   Last time this was given:  2 Units on 11/8/2017 10:31 PM                                insulin glargine 100 UNIT/ML injection   Commonly known as:  LANTUS SOLOSTAR   Inject 24 units under the skin daily.   Last time this was given:  12 Units on 11/8/2017  9:53 PM                                insulin pen needle 30G X 8 MM   Use as directed with Lantus                                labetalol 300 MG tablet   Commonly known as:  NORMODYNE   Take 1/2 tablet (150mg) BID. Hold for systolic BP less than 120.   Last time this was given:  150 mg on 11/9/2017  8:34 AM                                lactobacillus rhamnosus (GG) capsule   Take 1 capsule by mouth 2 times daily                                levothyroxine 50 MCG tablet   Commonly known as:  SYNTHROID   Take 1 tablet (50 mcg) by mouth daily   Last time this was given:  50 mcg on 11/9/2017  8:37 AM                                lidocaine 2 % topical gel   Commonly known as:  XYLOCAINE   Apply topically as needed for moderate pain 10 ML every 3 weeks                                loratadine 10 MG tablet   Commonly known as:  CLARITIN   Take 1 tablet (10 mg) by mouth daily                                meclizine 12.5 MG tablet   Commonly known as:  ANTIVERT   Take 1 tablet (12.5 mg) by mouth 3 times daily as needed for dizziness                                 ondansetron 4 MG ODT tab   Commonly known as:  ZOFRAN ODT   Take 1-2 tablets (4-8 mg) by mouth every 8 hours as needed for nausea                                order for DME   Equipment being ordered: Johnson Catheters - 18 french                                * order for DME   Equipment being ordered: Blue Chux Please send to [facility] medical                                * order for DME   Equipment being ordered: Ahuja FX CPAP interface (nasal pillows), size XS.                                * order for DME   Equipment being ordered: Silver Impregnated catheters HX of frequent UTI                                * order for DME   High back guerrero wheel chair MICHELLE 99 DX: morbid obesity, generalized weakness, physical deconditioning, chronic vertigo                                pravastatin 10 MG tablet   Commonly known as:  PRAVACHOL   TAKE ONE TABLET BY MOUTH EVERY DAY   Last time this was given:  10 mg on 11/9/2017  8:34 AM                                predniSONE 5 MG tablet   Commonly known as:  DELTASONE   Take 1 tablet (5 mg) by mouth daily   Last time this was given:  5 mg on 11/9/2017  8:36 AM                                simethicone 125 MG Chew chewable tablet   Commonly known as:  MYLICON   Take 1 tablet (125 mg) by mouth as needed for intestinal gas                                * tacrolimus 1 MG capsule   Commonly known as:  GENERIC EQUIVALENT   Take 3 capsules (3 mg) by mouth 2 times daily (total dose 3.5 mg twice daily)   Last time this was given:  11/9/2017  8:34 AM                                * tacrolimus 0.5 MG capsule   Commonly known as:  GENERIC EQUIVALENT   Take 1 capsule (0.5 mg) by mouth 2 times daily (total dose 3.5 mg twice daily)   Last time this was given:  11/9/2017  8:34 AM                                TraMADol HCl 50 MG Tbdp   Take 50 mg by mouth 3 times daily as needed                                TRILEPTAL 300 MG tablet   Take 1 tablet (300 mg) by mouth 2 times daily  For pain   Generic drug:  OXcarbazepine                                vitamin D 2000 UNITS tablet   Take 4,000 Units by mouth 2 times daily                                * Notice:  This list has 6 medication(s) that are the same as other medications prescribed for you. Read the directions carefully, and ask your doctor or other care provider to review them with you.

## 2017-11-06 NOTE — IP AVS SNAPSHOT
` ` Patient Information     Patient Name Sex     Gem Jade ([id number]) Female 1945       Room Bed    52 5219-      Patient Demographics     Address Phone E-mail Address    [address] [place] [address] [phone] (Home) *Preferred*  [phone] (Mobile) [email or web address]      Patient Ethnicity & Race     Ethnic Group Patient Race    Bahraini       Emergency Contact(s)     Name Relation Home Work Mobile    Chang Jade Spouse [phone] none [phone]    WorkJessica babin Daughter [phone] none [phone]      Documents on File        Status Date Received Description       Documents for the Patient    Face Sheet  () 02     Insurance Card Received 12     Privacy Notice - Sheridan Received 12     Face Sheet Received () 09     Insurance Card  10/02/03     Insurance Card Received 12     Insurance Card  02/10/06     Waiver - Payment  07     Waiver - Payment  09     External Medication Information Consent Accepted () 09     Face Sheet Received () 05/24/10     Patient ID       Consent for Services - Hospital/Clinic Received () 11/09/10     External Medication Information Consent Accepted () 11/09/10     HIM ERASMO Authorization - File Only   release from patient 11    HIM ERASMO Authorization   [facility]     Face Sheet Received () 11     Consent for Services - Hospital/Clinic Received () 11     CMS IM for Patient Signature Received 10/12/12     Advance Directives and Living Will Not Received  POLST: PROVIDER ORDERS FOR LIFE SUSTAINING TREATMENT    Advance Directives and Living Will Not Received  DIRECTIVES TO DEFINE SCOPE OF MEDICAL CARE-2011(NOT VERY LEGIBLE)    Advance Directives and Living Will Not Received  DIRECTIVES TO DEFINE SCOPE OF MEDICAL CARE-2011    Privacy Notice - Sheridan Received 12     Advance Directives  and Living Will Received 12 DIRECTIVES TO DEFINE SCOPE OF MEDICAL CARE-2011    Advance Directives and Living Will Received 12 DIRECTIVES TO DEFINE SCOPE OF MEDICAL CARE-2011    Privacy Notice - Maynard  12 ACKNOWLEDGMENT OF RECEIPT OF NOTICE OF PRIVACY PRACTICE    Privacy Notice - Maynard  12 ACKNOWLEDGMENT OF RECEIPT OF NOTICE OF PRIVACY PRACTICE    HIM ERASMO Authorization  12     Advance Directives and Living Will Received 12 DIRECTIVE TO DEFINE SCOPE OF MEDICAL CARE1/3/2011    [facility] ERASMO Authorization  12 Merged with [facility] dated 2012    Business/Insurance/Care Coordination/Health Form - Patient.2   [facility] Plan of Care 10/04/12    Advance Directives and Living Will Received 10/30/12 DIRECTIVE TO DEFINE SCOPE OF MEDICAL CARE    Consent for EHR Access  13 Copied from existing Consent for services - C/HOD collected on 2010    [facility] Specified Other       HIM ERASMO Authorization - File Only   13 ERASMO [facility]    HIM ERASMO Authorization - File Only   13 ERASMO [facility]    [facility] ERASMO Authorization - File Only   13 ERASMO [facility] ERASMO Authorization - File Only   13 ERASMO [facility]    Consent for Services - Hospital/Clinic Received () 04/10/13     Privacy Notice - [facility] ERASMO Authorization - File Only   [facility]    Consent for Services - Hospital/Clinic  () 14 CONSENT FOR SERVICE    HIM ERASMO Authorization  10/06/14 [employer]    HIM ERASMO Authorization - File Only   [employer] 2/19/15    HIM ERASMO Authorization  04/09/15 MEDICA    Consent for Services - Hospital/Clinic Received () 08/24/15     HIM ERASMO Authorization  12/28/15 MED[employer]/[place]    Consent for Services/Privacy Notice - Hospital/Clinic Received () 16     Consent for Services/Privacy Notice - Hospital/Clinic   () 16 CONSENT FOR SERVICE    Business/Insurance/Care Coordination/Health Form - Patient  16 [facility]- [facility]ERAPE[facility]IC ALERT  2016    External Medication Information Consent Accepted 17     External Medication Information Consent Accepted 17     Business/Insurance/Care Coordination/Health Form - Patient  17 [facility]/SIL[facility]CRIPT PROGRAF CAPSULE QUESTIONS, 17    Business/Insurance/Care Coordination/Health Form - Patient  17 S[facility] DENIAL OF PROGRAF CAPSULE 17    Consent for Services/Privacy Notice - Hospital/Clinic Received 08/10/17 CONSENT FOR SERVICE    Care Everywhere Prospective Auth Received 10/25/17     External Medication Information Consent  (Deleted)      Consent for Services/Privacy Notice - Hospital/Clinic Refused/Revoked (Deleted) 17        Documents for the Encounter    CMS IM for Patient Signature Received 17     Monitoring Device Output  17 INITIAL MONITORING STRIPS      Admission Information     Attending Provider Admitting Provider Admission Type Admission Date/Time    Christiano Webber MD [place], Sravani Daniels MD Emergency 17  1555    Discharge Date Hospital Service Auth/Cert Status Service Area     General Medicine Incomplete [facility]    Unit Room/Bed Admission Status       UU [id number] 5219/5219-01 Admission (Confirmed)       Admission     Complaint    Urinary tract infection      Hospital Account     Name Acct ID Class Status Primary Coverage    Gem Jade [id number] Inpatient Open MEDICARE - MEDICARE FOR HB SUPPLEMENT            Guarantor Account (for Hospital Account #[id number])     Name Relation to Pt Service Area Active? Acct Type    Gem Jade Self FCS Yes Personal/Family    Address Phone          [address] [place] [address] [phone](H)              Coverage Information (for Hospital Account #[id number])     1. MEDICARE/MEDICARE FOR HB SUPPLEMENT     F/O  Payor/Plan Precert #    MEDICARE/MEDICARE FOR HB SUPPLEMENT     Subscriber Subscriber #    Gem Jade [id number]    Address Phone    ATTN CLAIMS  [address]  [place] [address] [phone]          2. MEDICA/MEDICA PRIME SOLUTION     F/O Payor/Plan Precert #    MEDICA/MEDICA PRIME SOLUTION     Subscriber Subscriber #    Gem Jade [id number]    Address Phone    [address] [address]  [place] [address] [phone]

## 2017-11-06 NOTE — ED PROVIDER NOTES
[facility] DE[facility]MENT ([facility])  11/06/17   History     Chief Complaint   Patient presents with     Altered Mental Status     The history is provided by the EMS personnel and medical records. The history is limited by the condition of the patient (AMS).     Gem Jade is a 71 year old female with a medical history significant for s/p kidney transplant (10/27/1999), type 2 diabetes mellitus, UTI, C. Diff, ROB, pancytopenia, VRE, anemia and hyperkalemia. Patient was not responsive in the room, so history was provided by nursing note, which was provided by EMS. Patient was last at baseline on 11/4/2017, normally alert, oriented and speaks without difficulty, but is wheelchair bound. Patient was recently treated for a UTI and finished her antibiotics recently.    I have reviewed the Medications, Allergies, Past Medical and Surgical History, and Social History in the [employer] system.    Past Medical History:   Diagnosis Date     Background diabetic retinopathy(362.01) (H)     extensive diabetic retinopathy, s/p multiple laser treatments     Diabetic gastroparesis (H)     followed by [facility] (Dr. 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)      Urinary tract infection, site not specified     Chronic UTIs     UTI (urinary tract infection) due to urinary indwelling catheter (H)        Past Surgical History:   Procedure Laterality Date     C NONSPECIFIC PROCEDURE  10/99    kidney transplant     C NONSPECIFIC PROCEDURE  8/00    MRI  head, lumbar spine, pelvis     C TOTAL KNEE ARTHROPLASTY  3/03    Knee Replacement, Total right, post op PE     CHOLECYSTECTOMY       HC LEFT HEART CATHETERIZATION  1999    Cardiac Cath, Left Heart, Negative  (@ [facility])     HC LEFT HEART CATHETERIZATION  4/05    normal coronary angiogram at [facility], had mild troponin rise (0.71)     HC REMV CATARACT EXTRACAP,INSERT LENS  8/04    bilateral cataract repaired, left eye        Family History   Problem Relation Age of Onset     DIABETES Mother      DIABETES Brother      Hypertension Father      [id number] pneumonia     HEART DISEASE Father        Social History   Substance Use Topics     Smoking status: Former Smoker     Years: 17.00     Quit date: 6/1/1987     Smokeless tobacco: Not on file     Alcohol use No       Current Facility-Administered Medications   Medication     0.9% sodium chloride BOLUS     Current Outpatient Prescriptions   Medication     tacrolimus (GENERIC EQUIVALENT) 1 MG capsule     tacrolimus (GENERIC EQUIVALENT) 0.5 MG capsule     amoxicillin-clavulanate (AUGMENTIN) 500-125 MG per tablet     cloNIDine (CATAPRES) 0.1 MG tablet     Cholecalciferol (VITAMIN D) 2000 UNITS tablet     order for DME     predniSONE (DELTASONE) 5 MG tablet     insulin glargine (LANTUS SOLOSTAR) 100 UNIT/ML injection     insulin aspart (NOVOLOG FLEXPEN) 100 UNIT/ML injection     lidocaine (XYLOCAINE) 2 % topical gel     labetalol (NORMODYNE) 300 MG tablet     loratadine (CLARITIN) 10 MG tablet     pravastatin (PRAVACHOL) 10 MG tablet     blood glucose monitoring (ACCU-CHEK COMPACT PLUS) test strip     OXcarbazepine (TRILEPTAL) 300 MG tablet     gabapentin (NEURONTIN) 300 MG capsule     meclizine (ANTIVERT) 12.5 MG tablet     aspirin 81 MG chewable tablet     cinacalcet (SENSIPAR) 30 MG tablet     cyanocobalamin (VITAMIN B12) 1000 MCG/ML injection     order for DME     diphenoxylate-atropine (LOMOTIL) 2.5-0.025 MG per tablet     TraMADol HCl 50 MG TBDP     levothyroxine (SYNTHROID)  "50 MCG tablet     order for DME     glucagon (GLUCAGON EMERGENCY) 1 MG injection     insulin pen needle 30G X 8 MM     ondansetron (ZOFRAN ODT) 4 MG disintegrating tablet     simethicone (MYLICON) 125 MG CHEW     Lactobacillus Rhamnosus, GG, (CULTURELL) capsule     order for DME     Cranberry 400 MG TABS     famotidine (PEPCID) 20 MG tablet     ORDER FOR DME     ACETAMINOPHEN PO        Allergies   Allergen Reactions     Bactrim Ds [Sulfamethoxazole W/Trimethoprim]      Creatinine level increased     Atorvastatin Calcium      myalgias, muscle aches     Codeine Nausea and Vomiting     Darvocet [Propoxyphene N-Apap] Nausea and Vomiting     Hydromorphone      Levofloxacin Other (See Comments) and Diarrhea     hallucinations     Morphine      Nausea and vomiting     Niaspan [Niacin] GI Disturbance     Flushing even at low dose, GI upset     Percocet [Oxycodone-Acetaminophen]      Vomiting after taking med couple of times     Vicodin [Hydrocodone-Acetaminophen] Nausea and Vomiting         Review of Systems   Unable to perform ROS: Mental status change       Physical Exam   BP: (!) 203/98  Pulse: 85  Resp: 16  Height: 167.6 cm (5' 6\")  Weight: 99.8 kg (220 lb 1.6 oz)  SpO2: 94 %      Physical Exam   Constitutional: She appears distressed.   HENT:   Head: Normocephalic and atraumatic.   Right Ear: External ear normal.   Left Ear: External ear normal.   Eyes: Pupils are equal, round, and reactive to light.   Neck: Normal range of motion. No spinous process tenderness present. No rigidity.   Cardiovascular: Regular rhythm.    Murmur heard.   Systolic murmur is present with a grade of 3/6   Pulmonary/Chest: Effort normal. No stridor. No respiratory distress. She has rales.   Abdominal: Soft. She exhibits no distension. There is no tenderness. There is no rebound.   Neurological: No cranial nerve deficit. GCS eye subscore is 3. GCS verbal subscore is 4. GCS motor subscore is 5.   Skin: Skin is warm. No rash noted. She is not " diaphoretic.       ED Course   4:12 PM  The patient was seen and examined by Pawan Grossman MD in Room ED22.     ED Course     Procedures            Critical Care time:  was 30 minutes for this patient excluding procedures.         Labs Ordered and Resulted from Time of ED Arrival Up to the Time of Departure from the ED - No data to display         Assessments & Plan (with Medical Decision Making)   71-year-old female with history of type II diabetes, hyperkalemia, C. diff colitis arriving via EMS from home due to concern of change in mentation.  On initial evaluation the patient is noted to be awake, she is breathing comfortably with SPO2 in the upper 90s on room air with no evidence of increased work of breathing.  She has her eyes closed and resists my evaluation to open her eyes.  Temperature is noted be febrile to 100.6 concerning for infectious sources. The patient was recently treated for UTI.  EKG was obtained and demonstrates no sign of active strain or ischemic type pattern.  At this time the patient is moving extremities, I do not see sign of focal neurologic deficit but the patient does not comply with examination at this time.  I would obtain blood cultures as well as look for additional source of infection including the chest and urine.  At this time, she has no appreciable skin rash, she has no apparent nuchal rigidity, intraoral abscess, otitis media, pharyngitis or deep space infection of the neck. The patient has multiple antibiotic allergies but I would give her an initial dose of Zosyn for coverage awaiting return of above workup to help guide need for additional antibiotics.  Otherwise she has no specific toxidrome or withdrawal type symptoms, etiology is unclear at this time as we do not have any other additional information.  I suspect she will require hospitalization for fever and suppressed immune patient with location and disposition pending above workup.    This part of the medical record  was transcribed by Jevon Booth, Medical Scribe, from a dictation done by Pawan Grossman MD.       I have reviewed the nursing notes.    I have reviewed the findings, diagnosis, plan and need for follow up with the patient.    New Prescriptions    No medications on file       Final diagnoses:   Sepsis, due to unspecified organism (H)   Acute cystitis without hematuria     I, Jevon Booth, am serving as a trained medical scribe to document services personally performed by Pawan Grossman MD, based on the provider's statements to me.   I, Pawan Grossman MD, was physically present and have reviewed and verified the accuracy of this note documented by Jevon Booth.    11/6/2017   [facility], [place], EMERGENCY DEPARTMENT     Pawan Grossman MD  11/06/17 0093

## 2017-11-06 NOTE — PHARMACY-VANCOMYCIN DOSING SERVICE
Pharmacy Vancomycin Initial Note  Date of Service 2017  Patient's  1945  71 year old, female    Indication: Sepsis    Current estimated CrCl = Estimated Creatinine Clearance: 42.2 mL/min (based on Cr of 1.48).    Creatinine for last 3 days  2017: 12:45 PM Creatinine 1.48 mg/dL    Recent Vancomycin Level(s) for last 3 days  No results found for requested labs within last 72 hours.      Vancomycin IV Administrations (past 72 hours)      No vancomycin orders with administrations in past 72 hours.                Nephrotoxins and other renal medications (Future)    Start     Dose/Rate Route Frequency Ordered Stop    17 1800  vancomycin (VANCOCIN) 1,750 mg in NaCl 0.9 % 500 mL intermittent infusion      1,750 mg  over 2 Hours Intravenous EVERY 24 HOURS 17 1708      17 1706  vancomycin (VANCOCIN) 2,250 mg in NaCl 0.9 % 500 mL intermittent infusion      2,250 mg  over 2 Hours Intravenous ONCE 17 1706      17 1656  piperacillin-tazobactam (ZOSYN) 3.375 g vial to attach to  mL bag     Comments:  DO NOT USE THIS FIELD FOR ADMIN INSTRUCTIONS; INFORMATION DOES NOT SHOW ON MAR. USE THE FIELD ABOVE MARKED ADMIN INSTRUCTIONS    3.375 g  100 mL/hr over 1 Hours Intravenous ONCE 17 1655            Contrast Orders - past 72 hours     None                Plan:  1.  Start vancomycin  2250 mg IV once then vanco 1750mg IV q24 hrs starting tomorrow evening.   2.  Goal Trough Level: 15-20 mg/L   3.  Pharmacy will check trough levels as appropriate in 1-3 Days. May check random level early d/t age/renal function/wt.   4. Serum creatinine levels will be ordered daily for the first week of therapy and at least twice weekly for subsequent weeks.    5. Picacho method utilized to dose vancomycin therapy: combination of both methods     Jeanna Watson, PharmD    Jeanna Watson

## 2017-11-06 NOTE — IP AVS SNAPSHOT
[address] [address]    Phone:  [phone]                                       After Visit Summary   11/6/2017    Gem Jade    MRN: [id number]           After Visit Summary Signature Page     I have received my discharge instructions, and my questions have been answered. I have discussed any challenges I see with this plan with the nurse or doctor.    ..........................................................................................................................................  Patient/Patient Representative Signature      ..........................................................................................................................................  Patient Representative Print Name and Relationship to Patient    ..................................................               ................................................  Date                                            Time    ..........................................................................................................................................  Reviewed by Signature/Title    ...................................................              ..............................................  Date                                                            Time

## 2017-11-06 NOTE — LETTER
Transition Communication Hand-off for Care Transitions to Next Level of Care Provider    Name: Gem Jade  MRN #: [id number]  Primary Care Provider: Marivel Barcenas NP  Primary Clinic: [address] [place] [address] [address]     Reason for Hospitalization:    Acute cystitis without hematuria [N30.00]  Sepsis, due to unspecified organism (H) [A41.9]    Admit Date/Time: 11/6/2017  3:55 PM   Discharge Date: 11/9/2017    Payor Source: Payor: MEDICA / Plan: MEDICA PRIME SOLUTION / Product Type: Indemnity /     Follow-up plan:  Future Appointments  Date Time Provider Department Center   12/6/2017 2:00 PM Marivel Barcenas APRN CNP [facility]   12/6/2017 2:00 PM Conchita [place], [facility]        Referrals     Future Labs/Procedures    Home care nursing referral     Comments:    [facility]  Phone  [phone]  Fax  [phone]    Resumption of Skilled Nursing and Home Health Aide Services    Your provider has ordered home care nursing services.   If you have not been contacted within 2 days of your discharge please call the inpatient department phone number at [phone] .            Feliciano Recommendations:    Please review AVS and Patient should follow up with Primary Care to determine if antibiotic prophylaxis for prevention of UTI (and therefor prevention of another hospital admission) can/should be ordered. Patient has had 4 admits this year and 4 last year - ALL are due to UTI. (Due to impending closing of [facility] writer will notify prior primary clinic: specifically [facility] at [facility], Alexus Bassett).      Kassandra DE PAZN RN PHN  Patient Care Management Coordinator  [facility], and [facility]  Phone: [phone] / Pager: [phone]      AVS/Discharge Summary is the source of truth; this is a helpful guide for improved communication of patient story

## 2017-11-06 NOTE — TELEPHONE ENCOUNTER
Call from SEKOU [facility] who went to see patient today after she received a call from Keshawn, janet.  Upon arrival, she noted patient was non responsive.  She tried sternal rub with no response, pupils were pinpoint.  Yashira called 911 and patient is currently being transported to [facility].    Routing to provider as DENNIS.    Wendie Harris RN

## 2017-11-06 NOTE — IP AVS SNAPSHOT
"    [address] [facility]: [phone]                                              INTERAGENCY TRANSFER FORM - PHYSICIAN ORDERS   2017                    Hospital Admission Date: 2017  MARYJANE SHAVER   : 1945  Sex: Female        Attending Provider: Christiano Webber MD     Allergies:  Bactrim Ds [Sulfamethoxazole W/trimethoprim], Atorvastatin Calcium, Codeine, Darvocet [Propoxyphene N-apap], Hydromorphone, Levofloxacin, Morphine, Niaspan [Niacin], Percocet [Oxycodone-acetaminophen], Vicodin [Hydrocodone-acetaminophen]    Infection:  VRE, ESBL   Service:  GENERAL MEDI    Ht:  1.676 m (5' 6\")   Wt:  103.5 kg (228 lb 2.8 oz)   Admission Wt:  99.8 kg (220 lb 1.6 oz)    BMI:  36.83 kg/m 2   BSA:  2.2 m 2            Patient PCP Information     Provider PCP Type    Marivel Barcenas, NP, APRN CNP General      ED Clinical Impression     Diagnosis Description Comment Added By Time Added    Sepsis, due to unspecified organism (H) [A41.9] Sepsis, due to unspecified organism (H) [A41.9]  Pawan Grossman MD 2017  6:40 PM    Acute cystitis without hematuria [N30.00] Acute cystitis without hematuria [N30.00]  Pawan Grossman MD 2017  6:41 PM      Hospital Problems as of 2017              Priority Class Noted POA    Urinary tract infection Medium  2017 Yes      Non-Hospital Problems as of 2017              Priority Class Noted    Kidney replaced by transplant Medium  2002    Primary localized osteoarthrosis, lower leg Medium  2003    Pulmonary embolism and infarction (H) Medium  2003    Diabetes mellitus with background retinopathy (H) Medium  2/10/2006    TYPE 2 DIABETES, HBA1C GOAL < 8% Medium  10/31/2010    HYPERLIPIDEMIA LDL GOAL <100 Medium  10/31/2010    Diabetic gastroparesis (H) Medium  2011    Back pain Medium  2011    Compression fx, thoracic spine (H) Medium  2011    Hyperkalemia Medium  2012    Anemia Medium  2012 "    Clostridium difficile diarrhea Medium  8/7/2012    Hydronephrosis Medium  10/10/2012    Recurrent UTI Medium  3/20/2013    Benign essential hypertension Medium  4/15/2013    Hyponatremia Medium  6/29/2013    Vancomycin resistant Enterococcus Medium  7/1/2013    Clostridium difficile colitis Medium  8/9/2013    Diabetic polyneuropathy associated with type 2 diabetes mellitus (H) Medium  10/9/2015    Chronic shoulder pain, unspecified laterality Medium  10/9/2015    Morbid obesity, unspecified obesity type (H) Medium  2/10/2016    Health Care Home Medium  2/17/2016    Gastroenteritis Medium  5/26/2016    Atrial fibrillation, transient (H) Medium  6/6/2016    Altered mental status Medium  8/7/2016    Acute kidney injury (H) Medium  9/9/2016    Pancytopenia, acquired (H) Medium  9/9/2016    Obstructive sleep apnea syndrome Medium  9/9/2016    ROB (acute kidney injury) (H) Medium  3/8/2017    UTI (urinary tract infection) Medium  4/19/2017    Enterococcus faecalis infection Medium  4/20/2017      Code Status History     Date Active Date Inactive Code Status Order ID Comments User Context    11/9/2017 12:09 PM  Full Code [id number]  Brittani rOtiz MD Outpatient    11/7/2017  1:18 AM 11/9/2017 12:09 PM Full Code [id number]  Ruddy Licea MD Inpatient    4/28/2017  1:33 PM 11/7/2017  1:18 AM Full Code [id number]  Miguelito Green MD Outpatient    4/19/2017  4:32 AM 4/28/2017  1:33 PM Full Code [id number]  Usha Herrera MD ED    3/15/2017  9:24 AM 4/19/2017  4:32 AM Full Code [id number]  Alexi Richard MD Outpatient    3/15/2017  7:38 AM 3/15/2017  9:24 AM Full Code [id number]  Sheila Armstrong MD Outpatient    3/8/2017  7:11 PM 3/15/2017  7:38 AM Full Code [id number]  Marvin Turk MD Inpatient    2/20/2017  3:25 PM 3/8/2017  7:11 PM Full Code [id number]  Dennise Barrientos MD Outpatient    2/17/2017  4:05 AM 2/20/2017  3:25 PM Full Code [id number]  Natalia Drew MD Inpatient    9/5/2016 11:30 AM  2/17/2017  4:05 AM Full Code [id number]  Sloan Fajardo MD Outpatient    8/30/2016  3:46 AM 9/5/2016 11:30 AM Full Code [id number]  Afshin Emery MD ED    8/10/2016 11:56 AM 8/30/2016  3:46 AM Full Code [id number]  Heidi Barberin PEREZ APRN [facility] Outpatient    8/7/2016  1:58 AM 8/10/2016 11:56 AM Full Code [id number]  Roni Tobar MD ED    5/31/2016 12:27 PM 8/7/2016  1:58 AM Full Code [id number]  She Siddiqui MD Outpatient    5/27/2016 12:54 PM 5/31/2016 12:27 PM Full Code [id number]  Dajuan Sanchez MD Inpatient    5/26/2016  3:50 AM 5/27/2016 12:54 PM Full Code [id number]  Katja Garcia PA Inpatient    1/13/2016  2:14 PM 5/26/2016  3:50 AM Full Code [id number]  Victoriano Keller MD Outpatient    1/10/2016  5:33 PM 1/13/2016  2:14 PM Full Code [id number]  Sharri Lujan MD Inpatient    12/18/2015  5:41 PM 12/22/2015  3:08 PM Full Code [id number]  Izabella Hearn MD Inpatient    8/27/2015  7:45 AM 12/18/2015  5:41 PM Full Code [id number]  Enoc Gray MD Outpatient    8/24/2015  3:18 AM 8/27/2015  7:45 AM Full Code [id number]  Inder Arita MD Inpatient    3/23/2014  8:50 AM 8/24/2015  3:18 AM Full Code [id number]  Janki Marin MD Outpatient    3/20/2014  8:20 AM 3/23/2014  8:50 AM Full Code [id number]  Victoriano Keller MD Inpatient    9/5/2013  9:20 AM 3/20/2014  8:20 AM Full Code [id number]  Pawel Vides MD Outpatient    9/2/2013  5:11 AM 9/5/2013  9:20 AM Full Code [id number]  Terence Austin MD Inpatient    8/11/2013  9:34 AM 9/2/2013  5:11 AM Full Code [id number]  Janki Marin MD Outpatient    8/9/2013 11:19 PM 8/11/2013  9:34 AM Full Code [id number]  Taqueria Barkley, SEKOU Inpatient    6/29/2013  7:44 PM 7/2/2013  3:26 PM Full Code [id number]  Saw Ohara, RN Inpatient    6/21/2013  5:11 PM 6/29/2013  7:44 PM Full Code [id number]  Ren Young PA Outpatient    6/19/2013  7:38 AM 6/21/2013  5:11 PM Full Code [id number]  Kimberly Macias, RN Inpatient     5/22/2013  3:12 PM 6/19/2013  7:38 AM Full Code [id number]  Yandy Salgado MD Outpatient    5/19/2013 12:19 PM 5/22/2013  3:12 PM Full Code [id number]  Amanda Oreilly, RN Inpatient    10/10/2012  9:28 PM 10/12/2012  5:36 PM Full Code [id number]  Taya Goodson, RN Inpatient    10/9/2012  9:40 PM 10/10/2012  9:28 PM Full Code [id number]  Zoila Marcus, SEKOU Inpatient    9/12/2012 12:45 PM 9/15/2012 10:05 PM Full Code [id number]  Holly Isaacs, RN Inpatient    8/20/2012 11:09 AM 9/12/2012 12:45 PM Full Code [id number]  Izabella Hearn MD Outpatient    8/17/2012 12:32 AM 8/20/2012 11:09 AM Full Code [id number]  Omari Hernandez, SEKOU Inpatient    8/9/2012 11:13 AM 8/17/2012 12:32 AM Full Code [id number]  Bj Richmond MD Outpatient    8/7/2012 12:17 AM 8/9/2012 11:13 AM Full Code [id number]  Terence Jeffrey MD Inpatient    7/13/2012  9:07 PM 7/29/2012  8:57 PM Full Code [id number]  Nuria Goldstein RN Inpatient    3/26/2012  5:22 PM 3/30/2012  6:49 PM Full Code [id number]  Saw Ohara RN Inpatient    1/20/2012  5:27 PM 3/26/2012  5:22 PM Full Code [id number]  Jessica Shultz MD Outpatient    1/15/2012  8:49 AM 1/20/2012  5:27 PM Full Code [id number]  Santosh Gordon DO Inpatient    1/2/2012  2:32 PM 1/15/2012  8:49 AM Full Code [id number]  Janki Marin MD Outpatient    1/1/2012 10:28 AM 1/2/2012  2:32 PM Full Code [id number]  Janki Marin MD Outpatient    12/28/2011  3:33 PM 1/1/2012 10:28 AM Full Code [id number]  Elsi Fierro RN Inpatient    12/9/2011 12:21 PM 12/28/2011  3:33 PM Full Code [id number]  Terence Austin MD Outpatient    12/5/2011  7:34 PM 12/9/2011 12:21 PM Full Code [id number]  Chapo Humphrey MD Inpatient         Medication Review      START taking        Dose / Directions Comments    ciprofloxacin 500 MG tablet   Commonly known as:  CIPRO   Indication:  Urinary Tract Infection   Used for:  Acute cystitis without hematuria        Dose:  500 mg   Take  1 tablet (500 mg) by mouth every 12 hours for 7 days   Quantity:  14 tablet   Refills:  0          CONTINUE these medications which may have CHANGED, or have new prescriptions. If we are uncertain of the size of tablets/capsules you have at home, strength may be listed as something that might have changed.        Dose / Directions Comments    TraMADol HCl 50 MG Tbdp   Indication:  Moderate to Moderately Severe Pain   This may have changed:  when to take this   Used for:  Chronic shoulder pain, unspecified laterality        Dose:  50 mg   Take 50 mg by mouth 3 times daily as needed   Quantity:  90 tablet   Refills:  3          CONTINUE these medications which have NOT CHANGED        Dose / Directions Comments    ACCU-CHEK COMPACT PLUS test strip   Generic drug:  blood glucose monitoring        Use to test blood sugar 3 times daily or as directed.  Ok to substitute alternative if insurance prefers.   Quantity:  100 strip   Refills:  prn        ACETAMINOPHEN PO        Dose:  325-650 mg   Take 325-650 mg by mouth every 4 hours as needed.   Refills:  0        aspirin 81 MG chewable tablet   Used for:  Pulmonary embolism and infarction (H), Diabetes mellitus with background retinopathy (H)        Dose:  81 mg   Take 1 tablet (81 mg) by mouth daily Starting 1 week after your kidney biopsy   Quantity:  90 tablet   Refills:  3        cinacalcet 30 MG tablet   Commonly known as:  SENSIPAR   Used for:  Kidney replaced by transplant, Hypercalcemia        Dose:  30 mg   Take 1 tablet (30 mg) by mouth daily   Quantity:  60 tablet   Refills:  3        cloNIDine 0.1 MG tablet   Commonly known as:  CATAPRES   Used for:  HTN, goal below 140/90        TAKE ONE TABLET BY MOUTH TWICE A DAY   Quantity:  180 tablet   Refills:  3        Cranberry 400 MG Tabs        Dose:  400 mg   Take 400 mg by mouth 2 times daily   Refills:  0        cyanocobalamin 1000 MCG/ML injection   Commonly known as:  VITAMIN B12   Used for:  Iron deficiency  anemia, unspecified iron deficiency anemia type        Dose:  1000 mcg   Inject 1 mL (1,000 mcg) into the muscle every 30 days   Quantity:  3 mL   Refills:  3        diphenoxylate-atropine 2.5-0.025 MG per tablet   Commonly known as:  LOMOTIL   Used for:  Diarrhea of presumed infectious origin        Dose:  1 tablet   Take 1 tablet by mouth 4 times daily as needed for diarrhea   Quantity:  30 tablet   Refills:  0        famotidine 20 MG tablet   Commonly known as:  PEPCID        Dose:  20 mg   Take 1 tablet (20 mg) by mouth 2 times daily   Quantity:  60 tablet   Refills:  1        gabapentin 300 MG capsule   Commonly known as:  NEURONTIN   Used for:  Diabetic polyneuropathy associated with type 2 diabetes mellitus (H)        Dose:  900 mg   Take 3 capsules (900 mg) by mouth 2 times daily For pain   Quantity:  180 capsule   Refills:  3        GLUCAGON EMERGENCY 1 MG kit   Used for:  Hypoglycemia   Generic drug:  glucagon        Dose:  1 mg   Inject 1 mg into the muscle once   Quantity:  1 each   Refills:  11        insulin aspart 100 UNIT/ML injection   Commonly known as:  NovoLOG FLEXPEN   Used for:  Type 2 diabetes mellitus with diabetic polyneuropathy, with long-term current use of insulin (H)        Inject 10 units under the skin 2 times daily before meals as directed. Add sliding scale as needed. Avg use 25 units per day   Quantity:  15 mL   Refills:  11        insulin glargine 100 UNIT/ML injection   Commonly known as:  LANTUS SOLOSTAR   Used for:  Type 2 diabetes mellitus with diabetic polyneuropathy, with long-term current use of insulin (H)        Inject 24 units under the skin daily.   Quantity:  15 mL   Refills:  11    Hold on file for refills       insulin pen needle 30G X 8 MM   Used for:  Type 2 diabetes mellitus with diabetic nephropathy (H)        Use as directed with Lantus   Quantity:  100 each   Refills:  3        labetalol 300 MG tablet   Commonly known as:  NORMODYNE   Used for:  Benign essential  hypertension, Kidney replaced by transplant        Take 1/2 tablet (150mg) BID. Hold for systolic BP less than 120.   Quantity:  120 tablet   Refills:  3        lactobacillus rhamnosus (GG) capsule   Used for:  Diarrhea        Dose:  1 capsule   Take 1 capsule by mouth 2 times daily   Quantity:  60 capsule   Refills:  11        levothyroxine 50 MCG tablet   Commonly known as:  SYNTHROID   Used for:  Other specified hypothyroidism        Dose:  50 mcg   Take 1 tablet (50 mcg) by mouth daily   Quantity:  90 tablet   Refills:  3        lidocaine 2 % topical gel   Commonly known as:  XYLOCAINE   Used for:  Johnson catheter in place        Apply topically as needed for moderate pain 10 ML every 3 weeks   Quantity:  30 mL   Refills:  11        loratadine 10 MG tablet   Commonly known as:  CLARITIN   Used for:  Acute nasopharyngitis        Dose:  10 mg   Take 1 tablet (10 mg) by mouth daily   Quantity:  90 tablet   Refills:  3        meclizine 12.5 MG tablet   Commonly known as:  ANTIVERT   Used for:  Disorientation        Dose:  12.5 mg   Take 1 tablet (12.5 mg) by mouth 3 times daily as needed for dizziness   Quantity:  30 tablet   Refills:  0        ondansetron 4 MG ODT tab   Commonly known as:  ZOFRAN ODT   Used for:  Nausea        Dose:  4-8 mg   Take 1-2 tablets (4-8 mg) by mouth every 8 hours as needed for nausea   Quantity:  20 tablet   Refills:  5        order for DME   Used for:  Johnson catheter status        Equipment being ordered: Johnson Catheters - 18 french   Quantity:  3 catheter   Refills:  PRN        * order for DME   Used for:  Fecal incontinence        Equipment being ordered: Blue Chux Please send to [facility]   Quantity:  100 pad   Refills:  3        * order for DME   Used for:  Obstructive sleep apnea syndrome        Equipment being ordered: Vaimicom FX CPAP interface (nasal pillows), size XS.   Quantity:  1 each   Refills:  1        * order for DME   Used for:  Recurrent UTI        Equipment being  ordered: Silver Impregnated catheters HX of frequent UTI   Quantity:  12 each   Refills:  1        * order for DME   Used for:  Morbid obesity, unspecified obesity type (H), Vertigo, Generalized muscle weakness, Physical deconditioning        High back guerrero wheel chair MICHELLE 99 DX: morbid obesity, generalized weakness, physical deconditioning, chronic vertigo   Quantity:  1 Device   Refills:  0        pravastatin 10 MG tablet   Commonly known as:  PRAVACHOL   Used for:  Hyperlipidemia LDL goal <100        TAKE ONE TABLET BY MOUTH EVERY DAY   Quantity:  90 tablet   Refills:  3        predniSONE 5 MG tablet   Commonly known as:  DELTASONE   Used for:  Kidney replaced by transplant        Dose:  5 mg   Take 1 tablet (5 mg) by mouth daily   Quantity:  90 tablet   Refills:  3        simethicone 125 MG Chew chewable tablet   Commonly known as:  MYLICON        Dose:  125 mg   Take 1 tablet (125 mg) by mouth as needed for intestinal gas   Quantity:  120 tablet   Refills:  0        * tacrolimus 1 MG capsule   Commonly known as:  GENERIC EQUIVALENT   Used for:  Kidney transplant recipient, Long-term use of immunosuppressant medication        Dose:  3 mg   Take 3 capsules (3 mg) by mouth 2 times daily (total dose 3.5 mg twice daily)   Quantity:  180 capsule   Refills:  3        * tacrolimus 0.5 MG capsule   Commonly known as:  GENERIC EQUIVALENT   Used for:  Kidney transplant recipient        Dose:  0.5 mg   Take 1 capsule (0.5 mg) by mouth 2 times daily (total dose 3.5 mg twice daily)   Quantity:  60 capsule   Refills:  3        TRILEPTAL 300 MG tablet   Used for:  Diabetic polyneuropathy associated with diabetes mellitus due to underlying condition (H)   Generic drug:  OXcarbazepine        Dose:  300 mg   Take 1 tablet (300 mg) by mouth 2 times daily For pain   Quantity:  180 tablet   Refills:  3        vitamin D 2000 UNITS tablet   Used for:  Vitamin deficiency        Dose:  4000 Units   Take 4,000 Units by mouth 2 times  daily   Quantity:  100 tablet   Refills:  3        * Notice:  This list has 6 medication(s) that are the same as other medications prescribed for you. Read the directions carefully, and ask your doctor or other care provider to review them with you.      STOP taking     amoxicillin-clavulanate 500-125 MG per tablet   Commonly known as:  AUGMENTIN                     Further instructions from your care team       formerly [facility]  Referral: [phone]  Clinic: [phone]    Summary of Visit     Reason for your hospital stay       You were admitted with a catheter associated urinary tract infection.  You were started on antibiotics and should continue these antibiotics for 7 more days.  Follow up with your home care provider.             After Care     Activity       Your activity upon discharge: activity as tolerated       Diet       Follow this diet upon discharge:       Regular Diet Adult             Referrals     Home care nursing referral       [facility]  Phone  [phone]  Fax  [phone]    Resumption of Skilled Nursing and Home Health Aide Services    Your provider has ordered home care nursing services.   If you have not been contacted within 2 days of your discharge please call the inpatient department phone number at [phone] .             Your next 10 appointments already scheduled     Dec 06, 2017  2:00 PM CST   Return Visit with ANTHONY Antonio CNP   [facility] ([facility])    [address] [place] [id number] [phone]            Dec 06, 2017  2:00 PM CST   Office Visit with Conchita Toledo [facility] ([facility])    [address] [place] [id number] [phone]           Bring a current list of meds and any records pertaining to this visit. For Physicals, please bring immunization  records and any forms needing to be filled out. Please arrive 10 minutes early to complete paperwork.              Follow-Up Appointment Instructions     Future Labs/Procedures    Adult [facility] Follow-up and recommended labs and tests     Comments:    Follow up with primary care provider, Marivel Barcenas NP, within 7 days for hospital follow- up.  The following labs/tests are recommended: BMP, Mg, Phos.      Appointments on [place] and/or [facility] (with [facility] or [facility] provider or service). Call [phone] if you haven't heard regarding these appointments within 7 days of discharge.      Follow-Up Appointment Instructions     Adult [facility] Follow-up and recommended labs and tests       Follow up with primary care provider, Marivel Barcenas NP, within 7 days for hospital follow- up.  The following labs/tests are recommended: BMP, Mg, Phos.      Appointments on [place] and/or [facility] (with [facility] or [facility] provider or service). Call [phone] if you haven't heard regarding these appointments within 7 days of discharge.             Statement of Approval     Ordered          11/09/17 1209  I have reviewed and agree with all the recommendations and orders detailed in this document.  EFFECTIVE NOW     Approved and electronically signed by:  Brittani Ortiz MD

## 2017-11-07 LAB
ANION GAP SERPL CALCULATED.3IONS-SCNC: 10 MMOL/L (ref 3–14)
BUN SERPL-MCNC: 40 MG/DL (ref 7–30)
CALCIUM SERPL-MCNC: 8.5 MG/DL (ref 8.5–10.1)
CHLORIDE SERPL-SCNC: 107 MMOL/L (ref 94–109)
CO2 SERPL-SCNC: 21 MMOL/L (ref 20–32)
CREAT SERPL-MCNC: 1.22 MG/DL (ref 0.52–1.04)
ERYTHROCYTE [DISTWIDTH] IN BLOOD BY AUTOMATED COUNT: 13.9 % (ref 10–15)
GFR SERPL CREATININE-BSD FRML MDRD: 43 ML/MIN/1.7M2
GLUCOSE BLDC GLUCOMTR-MCNC: 164 MG/DL (ref 70–99)
GLUCOSE BLDC GLUCOMTR-MCNC: 231 MG/DL (ref 70–99)
GLUCOSE BLDC GLUCOMTR-MCNC: 247 MG/DL (ref 70–99)
GLUCOSE BLDC GLUCOMTR-MCNC: 275 MG/DL (ref 70–99)
GLUCOSE BLDC GLUCOMTR-MCNC: 286 MG/DL (ref 70–99)
GLUCOSE BLDC GLUCOMTR-MCNC: 328 MG/DL (ref 70–99)
GLUCOSE SERPL-MCNC: 266 MG/DL (ref 70–99)
HCT VFR BLD AUTO: 38.1 % (ref 35–47)
HGB BLD-MCNC: 12.1 G/DL (ref 11.7–15.7)
LACTATE BLD-SCNC: 1.3 MMOL/L (ref 0.7–2)
MCH RBC QN AUTO: 30.3 PG (ref 26.5–33)
MCHC RBC AUTO-ENTMCNC: 31.8 G/DL (ref 31.5–36.5)
MCV RBC AUTO: 95 FL (ref 78–100)
PLATELET # BLD AUTO: 137 10E9/L (ref 150–450)
POTASSIUM SERPL-SCNC: 3.8 MMOL/L (ref 3.4–5.3)
RBC # BLD AUTO: 4 10E12/L (ref 3.8–5.2)
SODIUM SERPL-SCNC: 139 MMOL/L (ref 133–144)
WBC # BLD AUTO: 11 10E9/L (ref 4–11)

## 2017-11-07 PROCEDURE — 83605 ASSAY OF LACTIC ACID: CPT | Performed by: INTERNAL MEDICINE

## 2017-11-07 PROCEDURE — 99233 SBSQ HOSP IP/OBS HIGH 50: CPT | Mod: GC | Performed by: INTERNAL MEDICINE

## 2017-11-07 PROCEDURE — 93005 ELECTROCARDIOGRAM TRACING: CPT

## 2017-11-07 PROCEDURE — 25000128 H RX IP 250 OP 636: Performed by: STUDENT IN AN ORGANIZED HEALTH CARE EDUCATION/TRAINING PROGRAM

## 2017-11-07 PROCEDURE — 99211 OFF/OP EST MAY X REQ PHY/QHP: CPT

## 2017-11-07 PROCEDURE — 00000146 ZZHCL STATISTIC GLUCOSE BY METER IP

## 2017-11-07 PROCEDURE — 85027 COMPLETE CBC AUTOMATED: CPT | Performed by: INTERNAL MEDICINE

## 2017-11-07 PROCEDURE — 25000132 ZZH RX MED GY IP 250 OP 250 PS 637: Mod: GY | Performed by: INTERNAL MEDICINE

## 2017-11-07 PROCEDURE — 25000132 ZZH RX MED GY IP 250 OP 250 PS 637: Mod: GY | Performed by: STUDENT IN AN ORGANIZED HEALTH CARE EDUCATION/TRAINING PROGRAM

## 2017-11-07 PROCEDURE — 93010 ELECTROCARDIOGRAM REPORT: CPT | Performed by: INTERNAL MEDICINE

## 2017-11-07 PROCEDURE — A9270 NON-COVERED ITEM OR SERVICE: HCPCS | Mod: GY | Performed by: STUDENT IN AN ORGANIZED HEALTH CARE EDUCATION/TRAINING PROGRAM

## 2017-11-07 PROCEDURE — 36415 COLL VENOUS BLD VENIPUNCTURE: CPT | Performed by: INTERNAL MEDICINE

## 2017-11-07 PROCEDURE — 80048 BASIC METABOLIC PNL TOTAL CA: CPT | Performed by: INTERNAL MEDICINE

## 2017-11-07 PROCEDURE — 93010 ELECTROCARDIOGRAM REPORT: CPT | Mod: 77 | Performed by: INTERNAL MEDICINE

## 2017-11-07 PROCEDURE — 12000006 ZZH R&B IMCU INTERMEDIATE UMMC

## 2017-11-07 PROCEDURE — 25000131 ZZH RX MED GY IP 250 OP 636 PS 637: Mod: GY | Performed by: STUDENT IN AN ORGANIZED HEALTH CARE EDUCATION/TRAINING PROGRAM

## 2017-11-07 PROCEDURE — 96376 TX/PRO/DX INJ SAME DRUG ADON: CPT | Performed by: EMERGENCY MEDICINE

## 2017-11-07 PROCEDURE — A9270 NON-COVERED ITEM OR SERVICE: HCPCS | Mod: GY | Performed by: INTERNAL MEDICINE

## 2017-11-07 PROCEDURE — 25000131 ZZH RX MED GY IP 250 OP 636 PS 637: Mod: GY | Performed by: INTERNAL MEDICINE

## 2017-11-07 PROCEDURE — 25800025 ZZH RX 258: Performed by: STUDENT IN AN ORGANIZED HEALTH CARE EDUCATION/TRAINING PROGRAM

## 2017-11-07 PROCEDURE — 25000125 ZZHC RX 250: Performed by: INTERNAL MEDICINE

## 2017-11-07 PROCEDURE — 25000128 H RX IP 250 OP 636: Performed by: INTERNAL MEDICINE

## 2017-11-07 RX ORDER — LEVOTHYROXINE SODIUM 25 UG/1
50 TABLET ORAL DAILY
Status: DISCONTINUED | OUTPATIENT
Start: 2017-11-07 | End: 2017-11-09 | Stop reason: HOSPADM

## 2017-11-07 RX ORDER — CLONIDINE HYDROCHLORIDE 0.1 MG/1
0.1 TABLET ORAL 2 TIMES DAILY
Status: DISCONTINUED | OUTPATIENT
Start: 2017-11-07 | End: 2017-11-09 | Stop reason: HOSPADM

## 2017-11-07 RX ORDER — ASPIRIN 81 MG/1
81 TABLET, CHEWABLE ORAL DAILY
Status: DISCONTINUED | OUTPATIENT
Start: 2017-11-07 | End: 2017-11-09 | Stop reason: HOSPADM

## 2017-11-07 RX ORDER — HEPARIN SODIUM 5000 [USP'U]/.5ML
5000 INJECTION, SOLUTION INTRAVENOUS; SUBCUTANEOUS EVERY 12 HOURS
Status: DISCONTINUED | OUTPATIENT
Start: 2017-11-07 | End: 2017-11-09 | Stop reason: HOSPADM

## 2017-11-07 RX ORDER — ONDANSETRON 4 MG/1
4 TABLET, ORALLY DISINTEGRATING ORAL EVERY 6 HOURS PRN
Status: DISCONTINUED | OUTPATIENT
Start: 2017-11-07 | End: 2017-11-09 | Stop reason: HOSPADM

## 2017-11-07 RX ORDER — ONDANSETRON 2 MG/ML
4 INJECTION INTRAMUSCULAR; INTRAVENOUS EVERY 6 HOURS PRN
Status: DISCONTINUED | OUTPATIENT
Start: 2017-11-07 | End: 2017-11-09 | Stop reason: HOSPADM

## 2017-11-07 RX ORDER — NYSTATIN 100000 U/G
CREAM TOPICAL 2 TIMES DAILY
Status: DISCONTINUED | OUTPATIENT
Start: 2017-11-07 | End: 2017-11-09 | Stop reason: HOSPADM

## 2017-11-07 RX ORDER — PIPERACILLIN SODIUM, TAZOBACTAM SODIUM 3; .375 G/15ML; G/15ML
3.38 INJECTION, POWDER, LYOPHILIZED, FOR SOLUTION INTRAVENOUS EVERY 6 HOURS
Status: DISCONTINUED | OUTPATIENT
Start: 2017-11-07 | End: 2017-11-07

## 2017-11-07 RX ORDER — LABETALOL HYDROCHLORIDE 300 MG/1
150 TABLET, FILM COATED ORAL EVERY 12 HOURS SCHEDULED
Status: DISCONTINUED | OUTPATIENT
Start: 2017-11-07 | End: 2017-11-09 | Stop reason: HOSPADM

## 2017-11-07 RX ORDER — PREDNISONE 5 MG/1
5 TABLET ORAL DAILY
Status: DISCONTINUED | OUTPATIENT
Start: 2017-11-07 | End: 2017-11-09 | Stop reason: HOSPADM

## 2017-11-07 RX ORDER — AMOXICILLIN 250 MG
2 CAPSULE ORAL 2 TIMES DAILY PRN
Status: DISCONTINUED | OUTPATIENT
Start: 2017-11-07 | End: 2017-11-09 | Stop reason: HOSPADM

## 2017-11-07 RX ORDER — TACROLIMUS 0.5 MG/1
0.5 CAPSULE ORAL 2 TIMES DAILY
Status: DISCONTINUED | OUTPATIENT
Start: 2017-11-07 | End: 2017-11-07 | Stop reason: DRUGHIGH

## 2017-11-07 RX ORDER — AMOXICILLIN 250 MG
1 CAPSULE ORAL 2 TIMES DAILY PRN
Status: DISCONTINUED | OUTPATIENT
Start: 2017-11-07 | End: 2017-11-09 | Stop reason: HOSPADM

## 2017-11-07 RX ORDER — NALOXONE HYDROCHLORIDE 0.4 MG/ML
.1-.4 INJECTION, SOLUTION INTRAMUSCULAR; INTRAVENOUS; SUBCUTANEOUS
Status: DISCONTINUED | OUTPATIENT
Start: 2017-11-07 | End: 2017-11-09 | Stop reason: HOSPADM

## 2017-11-07 RX ORDER — PRAVASTATIN SODIUM 10 MG
10 TABLET ORAL DAILY
Status: DISCONTINUED | OUTPATIENT
Start: 2017-11-07 | End: 2017-11-09 | Stop reason: HOSPADM

## 2017-11-07 RX ORDER — FAMOTIDINE 10 MG
20 TABLET ORAL 2 TIMES DAILY
Status: DISCONTINUED | OUTPATIENT
Start: 2017-11-07 | End: 2017-11-09 | Stop reason: HOSPADM

## 2017-11-07 RX ORDER — NICOTINE POLACRILEX 4 MG
15-30 LOZENGE BUCCAL
Status: DISCONTINUED | OUTPATIENT
Start: 2017-11-07 | End: 2017-11-09 | Stop reason: HOSPADM

## 2017-11-07 RX ORDER — POLYETHYLENE GLYCOL 3350 17 G/17G
17 POWDER, FOR SOLUTION ORAL DAILY PRN
Status: DISCONTINUED | OUTPATIENT
Start: 2017-11-07 | End: 2017-11-09 | Stop reason: HOSPADM

## 2017-11-07 RX ORDER — CINACALCET 30 MG/1
30 TABLET, FILM COATED ORAL DAILY
Status: DISCONTINUED | OUTPATIENT
Start: 2017-11-07 | End: 2017-11-09 | Stop reason: HOSPADM

## 2017-11-07 RX ORDER — DEXTROSE MONOHYDRATE, SODIUM CHLORIDE, AND POTASSIUM CHLORIDE 50; 1.49; 4.5 G/1000ML; G/1000ML; G/1000ML
INJECTION, SOLUTION INTRAVENOUS CONTINUOUS
Status: DISCONTINUED | OUTPATIENT
Start: 2017-11-07 | End: 2017-11-07

## 2017-11-07 RX ORDER — DEXTROSE MONOHYDRATE 25 G/50ML
25-50 INJECTION, SOLUTION INTRAVENOUS
Status: DISCONTINUED | OUTPATIENT
Start: 2017-11-07 | End: 2017-11-09 | Stop reason: HOSPADM

## 2017-11-07 RX ORDER — ACETAMINOPHEN 325 MG/1
650 TABLET ORAL EVERY 4 HOURS PRN
Status: DISCONTINUED | OUTPATIENT
Start: 2017-11-07 | End: 2017-11-09 | Stop reason: HOSPADM

## 2017-11-07 RX ADMIN — HEPARIN SODIUM 5000 UNITS: 5000 INJECTION, SOLUTION INTRAVENOUS; SUBCUTANEOUS at 09:29

## 2017-11-07 RX ADMIN — INSULIN ASPART 3 UNITS: 100 INJECTION, SOLUTION INTRAVENOUS; SUBCUTANEOUS at 21:02

## 2017-11-07 RX ADMIN — INSULIN ASPART 3 UNITS: 100 INJECTION, SOLUTION INTRAVENOUS; SUBCUTANEOUS at 04:27

## 2017-11-07 RX ADMIN — INSULIN ASPART 4 UNITS: 100 INJECTION, SOLUTION INTRAVENOUS; SUBCUTANEOUS at 09:34

## 2017-11-07 RX ADMIN — PIPERACILLIN AND TAZOBACTAM 4.5 G: 4; .5 INJECTION, POWDER, LYOPHILIZED, FOR SOLUTION INTRAVENOUS; PARENTERAL at 00:40

## 2017-11-07 RX ADMIN — ACETAMINOPHEN 650 MG: 325 TABLET, FILM COATED ORAL at 02:16

## 2017-11-07 RX ADMIN — PIPERACILLIN AND TAZOBACTAM 4.5 G: 4; .5 INJECTION, POWDER, LYOPHILIZED, FOR SOLUTION INTRAVENOUS; PARENTERAL at 05:21

## 2017-11-07 RX ADMIN — INSULIN ASPART 3 UNITS: 100 INJECTION, SOLUTION INTRAVENOUS; SUBCUTANEOUS at 11:58

## 2017-11-07 RX ADMIN — TACROLIMUS 3.5 MG: 1 CAPSULE ORAL at 09:30

## 2017-11-07 RX ADMIN — INSULIN GLARGINE 12 UNITS: 100 INJECTION, SOLUTION SUBCUTANEOUS at 22:08

## 2017-11-07 RX ADMIN — CLONIDINE HYDROCHLORIDE 0.1 MG: 0.1 TABLET ORAL at 09:30

## 2017-11-07 RX ADMIN — DEXTROSE, SODIUM CHLORIDE, AND POTASSIUM CHLORIDE: 5; .45; .075 INJECTION INTRAVENOUS at 02:03

## 2017-11-07 RX ADMIN — NYSTATIN: 100000 CREAM TOPICAL at 21:15

## 2017-11-07 RX ADMIN — PIPERACILLIN AND TAZOBACTAM 4.5 G: 4; .5 INJECTION, POWDER, LYOPHILIZED, FOR SOLUTION INTRAVENOUS; PARENTERAL at 22:53

## 2017-11-07 RX ADMIN — FAMOTIDINE 20 MG: 20 TABLET ORAL at 20:56

## 2017-11-07 RX ADMIN — PIPERACILLIN AND TAZOBACTAM 4.5 G: 4; .5 INJECTION, POWDER, LYOPHILIZED, FOR SOLUTION INTRAVENOUS; PARENTERAL at 17:06

## 2017-11-07 RX ADMIN — PRAVASTATIN SODIUM 10 MG: 10 TABLET ORAL at 09:29

## 2017-11-07 RX ADMIN — TACROLIMUS 3.5 MG: 1 CAPSULE ORAL at 20:56

## 2017-11-07 RX ADMIN — HYDRALAZINE HYDROCHLORIDE 10 MG: 20 INJECTION INTRAMUSCULAR; INTRAVENOUS at 02:16

## 2017-11-07 RX ADMIN — CINACALCET HYDROCHLORIDE 30 MG: 30 TABLET, COATED ORAL at 09:28

## 2017-11-07 RX ADMIN — INSULIN GLARGINE 12 UNITS: 100 INJECTION, SOLUTION SUBCUTANEOUS at 02:03

## 2017-11-07 RX ADMIN — CLONIDINE HYDROCHLORIDE 0.1 MG: 0.1 TABLET ORAL at 20:56

## 2017-11-07 RX ADMIN — Medication 150 MG: at 09:30

## 2017-11-07 RX ADMIN — ASPIRIN 81 MG CHEWABLE TABLET 81 MG: 81 TABLET CHEWABLE at 09:28

## 2017-11-07 RX ADMIN — NYSTATIN: 100000 CREAM TOPICAL at 04:27

## 2017-11-07 RX ADMIN — ACETAMINOPHEN 650 MG: 325 TABLET, FILM COATED ORAL at 20:55

## 2017-11-07 RX ADMIN — FAMOTIDINE 20 MG: 20 TABLET ORAL at 09:29

## 2017-11-07 RX ADMIN — PREDNISONE 5 MG: 5 TABLET ORAL at 09:28

## 2017-11-07 RX ADMIN — NYSTATIN: 100000 CREAM TOPICAL at 09:29

## 2017-11-07 RX ADMIN — INSULIN ASPART 1 UNITS: 100 INJECTION, SOLUTION INTRAVENOUS; SUBCUTANEOUS at 15:35

## 2017-11-07 RX ADMIN — Medication 150 MG: at 20:57

## 2017-11-07 RX ADMIN — PIPERACILLIN AND TAZOBACTAM 4.5 G: 4; .5 INJECTION, POWDER, LYOPHILIZED, FOR SOLUTION INTRAVENOUS; PARENTERAL at 11:56

## 2017-11-07 RX ADMIN — LEVOTHYROXINE SODIUM 50 MCG: 25 TABLET ORAL at 09:28

## 2017-11-07 RX ADMIN — ONDANSETRON 4 MG: 2 INJECTION INTRAMUSCULAR; INTRAVENOUS at 02:19

## 2017-11-07 RX ADMIN — HEPARIN SODIUM 5000 UNITS: 5000 INJECTION, SOLUTION INTRAVENOUS; SUBCUTANEOUS at 21:15

## 2017-11-07 ASSESSMENT — ACTIVITIES OF DAILY LIVING (ADL)
ADLS_ACUITY_SCORE: 26
ADLS_ACUITY_SCORE: 25
ADLS_ACUITY_SCORE: 25
ADLS_ACUITY_SCORE: 23
ADLS_ACUITY_SCORE: 26

## 2017-11-07 ASSESSMENT — PAIN DESCRIPTION - DESCRIPTORS
DESCRIPTORS: ACHING
DESCRIPTORS: ACHING
DESCRIPTORS: PATIENT UNABLE TO DESCRIBE

## 2017-11-07 NOTE — PROGRESS NOTES
Care Coordinator Progress Note     Admission Date/Time:  11/6/2017  Attending MD:  Christiano Webber MD     Data  Chart reviewed, discussed with interdisciplinary team.   Patient was admitted for:    Sepsis, due to unspecified organism (H)  Acute cystitis without hematuria.    Concerns with insurance coverage for discharge needs: None; Medicare and [employer]  Current Living Situation: Patient lives with spouse (Chang is patients primary care giver)  Support System: Supportive and Involved; spouse and daughter  Services Involved: DME and Home Care     RN Care Coordinator, Alexus Bassett at [facility]  Transportation: [employer] transport  Barriers to Discharge: chronically ill, dependent with mobility/activities of daily living, physical impairment and medical clearance    Coordination of Care  Chart reviewed and plan of care discussed with MD team. Attempted to locate spouse in patients room twice today; did not connect with him (left message on home voicemail).    Patient admitted for altered mental status secondary to urosepsis. According to chart notes patient is completely dependent on spouse for all cares, bed bound, lives at home, has [facility] RN and HHA services. Plan for patient to discharge when mentation and alertness level have returned to baseline (per husbands assessment as he is her primary care giver. Expect that patient will be discharged with antibiotic prophylaxis for prevention of UTI due to the frequency of recurrence.      RN Care Coordinator will continue to follow for additional discharge needs that may arise.       Referrals: Provided patient/family with options for resumption of Home Care.       [facility]  Phone  [phone]  Fax  [phone]    Resumption of Skilled Nursing and Home Health Aide Services     Plan  Anticipated Discharge Date:  Pending resolution of altered mental status 2/2 UTI  Anticipated Discharge Plan:  Home with  spouse    Kassandra Wright BSN RN PHN  Patient Care Management Coordinator for    [facility], and [facility]A & 5B Off Service Patients  [facility]  Phone: [phone]  Pager: [phone]

## 2017-11-07 NOTE — PROGRESS NOTES
[facility]  Patient is currently open to home care services with [facility].  The patient is currently receiving RN, HHA services.  [facility]  and team have been notified of patient admission.  [facility] liaison will continue to follow patient during stay.  If appropriate provide orders to resume home care at time of discharge.    Porsha Haas RN Liaison   [facility]

## 2017-11-07 NOTE — PROGRESS NOTES
ED Boarding Patient Plan of Care    Admitting service: Tom Dumont  For all critical changes in vital sign or critical lab values please notify the ED Physician immediately.     For non-critical changes or any other patient care questions, please contact the admitting service at:   From now to 6:30am contact number is [phone]  From 6:30am please contact the shireen team that will take over in the morning.  Please see sticky note.     While the patient remains in the ED please follow all active orders written by the ED providers or by the Medicine Team.  Do not release the sign and held orders until the patient arrives on the floor.    Once a bed is available please send text message to IP team to notify them that the patient is moving to the floor.    Ruddy Licea MD  Pager: [phone]

## 2017-11-07 NOTE — PROGRESS NOTES
Neuro: Alert with intermittent lethargy. Unable to determine orientation as pt selectively responds to assessment questions   Cardiac: SR/ST. VSS; SBP elevated at times in the 160s during reported CP/abdpminal pain. MD reported concerning reported CP. STAT EKG done, vital signs checked. Provider declined to treat elevated CP as pt is intermittently confused and EKG showed no acute changes. Vital signs checked cyclically. Pt reassessed but denied CP.  Respiratory: Sating WDL on RA. Pt was placed on NC 2L for intermittent desaturations to the 70s.   GI/: Adequate urine output. No BM this shift.   Diet/appetite: Placed on NPO diet d/t mentation status. Lantus given, BS checked Q4H with coverage.   Activity: Q2H turns in bed. Passive and active ROM encouraged.   Pain: Tylenol given for reported abdominal pain, pt slept well following intervention.    Skin: Scattered scabs noted on back and extremities. Interdry placed under abdominal and perineal folds; nystatin cream applied.   LDA's: Chronic indwelling catheter intact and drains well. Urinary output cloudy and malodorous.     Plan: Continue with POC. Notify primary team with changes.

## 2017-11-07 NOTE — PROGRESS NOTES
[facility] Progress Note      Patient: Gem Jade  Date of Admission: 11/6/2017  Hospital Day: # 0    Date of Service: 11/7/2017    Assessment & Plan:  Gem Jade is a 72 yo F with a history of kidney transplant (1999), neurogenic bladder with chronic rico and recurrent UTI's who was brought to the ED by her  for altered mental status.       # acute toxic/metabolic encephalopathy  Likely secondary to UTI.  CT head negative.  No obvious electrolyte abnormalities.  - hold sedating medications  - resume in AM if mental status improves     # rico associated ROBBY  Multiple UT's due to chronic indwelling rico.  History of ESBL but last in 2/2016 and has more recently grown sensitive organisms.    - continue zosyn (low threshold to broaden to meropenem w/ ESBL if decompensates)     # HTN   - continue PTA labetalol and clonidine    # DM   - glargine 12 units QHS (reduced from home dose)  - SSI  - hypoglycemic protocol    # hx of kidney transplant  - continue PTA tacro and prednisone  - tacro level in AM    FEN: regular diet  GI PPx: none needed  DVT PPx: heparin   CODE: full   Disposition: pending improvement in mental status and treatment of UTI    Patient was seen and discussed with Dr. Webber.     Brittani Ortiz MD, MS  Internal Medicine, PGY-2  Pager: [phone]    ___________________________________________________________________    Subjective:  Minimally responsive this AM.  Required sternal rub for response, then only responded with head nods and was non-verbal.  Denied abdominal pain.  No SOB.  No CP.  Not aware where she is or the year.      Medications:  Current Facility-Administered Medications   Medication     aspirin chewable tablet 81 mg     cinacalcet (SENSIPAR) tablet 30 mg     cloNIDine (CATAPRES) tablet 0.1 mg     famotidine (PEPCID) tablet 20 mg     labetalol (NORMODYNE) half-tab 150 mg     levothyroxine (SYNTHROID/LEVOTHROID) tablet 50 mcg     pravastatin (PRAVACHOL) tablet 10 mg     predniSONE  "(DELTASONE) tablet 5 mg     tacrolimus (GENERIC EQUIVALENT) 3.5 mg     naloxone (NARCAN) injection 0.1-0.4 mg     heparin sodium PF injection 5,000 Units     acetaminophen (TYLENOL) tablet 650 mg     senna-docusate (SENOKOT-S;PERICOLACE) 8.6-50 MG per tablet 1 tablet    Or     senna-docusate (SENOKOT-S;PERICOLACE) 8.6-50 MG per tablet 2 tablet     polyethylene glycol (MIRALAX/GLYCOLAX) Packet 17 g     ondansetron (ZOFRAN-ODT) ODT tab 4 mg    Or     ondansetron (ZOFRAN) injection 4 mg     glucose 40 % gel 15-30 g    Or     dextrose 50 % injection 25-50 mL    Or     glucagon injection 1 mg     insulin glargine (LANTUS) injection 12 Units     nystatin (MYCOSTATIN) cream     0.9% sodium chloride BOLUS     hydrALAZINE (APRESOLINE) injection 10 mg     piperacillin-tazobactam (ZOSYN) 4.5 g vial to attach to  mL bag     insulin aspart (NovoLOG) inj (RAPID ACTING)       Vitals:  Blood pressure 165/70, pulse 85, temperature 99.4  F (37.4  C), temperature source Oral, resp. rate 20, height 1.676 m (5' 6\"), weight 101 kg (222 lb 10.6 oz), SpO2 100 %, not currently breastfeeding.    Physical Exam:  General  - laying bed, no acute distress  HEENT - NC/AT, no scleral icterus, mmm  Cardiovascular - RRR, normal S1/S2, no murmur appreciated  Pulmonary - CTAB, no wheezing or crackles appreciated, normal respiratory effort on room air  Abdomen - soft, non-tender, non-distended   - indwelling rico   Extremities - warm and well perfused, no peripheral edema in bilateral lower extremities  Neurologic - oriented to person only   Skin - no visible rashes or lesions on limited exam    Labs:  Reviewed.     "

## 2017-11-07 NOTE — H&P
History & Physical     Gem Jade MRN# [id number]   YOB: 1945 Age: 71 year old      Date of Admission:  11/6/2017    Chief Complaint: altered mental status      History of Present Illness:  Gem Jade is a 70 yo F with a history of kidney transplant (1999), neurogenic bladder with chronic rico, recurrent UTI's who was brought to the ED by her  for altered mental status.  Patient at baseline is bedbound for the last 6 years 2/2 vertigo, charcot foot, OA etc.  Her mental status is sharp, per .  However, whenever she has an UTI she becomes very sleepy and unresponsive.   3 weeks ago she was diagnosed with UTI for which she was treated with 2 weeks of augmentin, she finished course last Wednesday.  Since last Friday she started acting more sleepy, and since Sunday she hasn't been eating or drinking anything, including her medications.   Her  has tried to keep giving her water to keep her hydrated.      She was found to be febrile and has dirty UA.  Got IVFs, started on vanco-zosyn.  CT head negative, CXR normal.  Mental status slightly improved when I saw her from ED provider signout but she is still not answering any question but she is making eye contact and following some commands.         ROS: 10 point ROS is unable to be obtained from patient.     Past Medical History:  reviewed  Past Medical History:   Diagnosis Date     Background diabetic retinopathy(362.01) (H)     extensive diabetic retinopathy, s/p multiple laser treatments     Diabetic gastroparesis (H)     followed by [facility] (Dr. 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)      Urinary tract infection, site not specified     Chronic UTIs     UTI (urinary tract infection) due to urinary indwelling catheter (H)        Past Surgical History:  Past Surgical History:   Procedure Laterality Date     C NONSPECIFIC PROCEDURE  10/99    kidney transplant     C NONSPECIFIC PROCEDURE  8/00    MRI head, lumbar spine, pelvis     C TOTAL KNEE ARTHROPLASTY  3/03    Knee Replacement, Total right, post op PE     CHOLECYSTECTOMY       HC LEFT HEART CATHETERIZATION  1999    Cardiac Cath, Left Heart, Negative  (@ [facility])     HC LEFT HEART CATHETERIZATION  4/05    normal coronary angiogram at [facility], had mild troponin rise (0.71)     HC REMV CATARACT EXTRACAP,INSERT LENS  8/04    bilateral cataract repaired, left eye        Medications:  Reviewed    No current facility-administered medications on file prior to encounter.   Current Outpatient Prescriptions on File Prior to Encounter:  tacrolimus (GENERIC EQUIVALENT) 1 MG capsule Take 3 capsules (3 mg) by mouth 2 times daily (total dose 3.5 mg twice daily)   tacrolimus (GENERIC EQUIVALENT) 0.5 MG capsule Take 1 capsule (0.5 mg) by mouth 2 times daily (total dose 3.5 mg twice daily)   amoxicillin-clavulanate (AUGMENTIN) 500-125 MG per tablet Take 1 tablet by mouth 3 times daily   cloNIDine (CATAPRES) 0.1 MG tablet TAKE ONE TABLET BY MOUTH TWICE A DAY   Cholecalciferol (VITAMIN D) 2000 UNITS tablet Take 4,000 Units by mouth 2 times daily   order for DME High back guerrero wheel chairLON 99DX: morbid obesity, generalized weakness, physical deconditioning, chronic vertigo   predniSONE (DELTASONE) 5 MG tablet Take 1 tablet (5 mg) by mouth daily   insulin glargine (LANTUS SOLOSTAR) 100 UNIT/ML injection Inject 24 units under the skin daily.   insulin aspart (NOVOLOG FLEXPEN) 100 UNIT/ML injection  Inject 10 units under the skin 2 times daily before meals as directed. Add sliding scale as needed. Avg use 25 units per day   lidocaine (XYLOCAINE) 2 % topical gel Apply topically as needed for moderate pain 10 ML every 3 weeks   labetalol (NORMODYNE) 300 MG tablet Take 1/2 tablet (150mg) BID. Hold for systolic BP less than 120.   loratadine (CLARITIN) 10 MG tablet Take 1 tablet (10 mg) by mouth daily   pravastatin (PRAVACHOL) 10 MG tablet TAKE ONE TABLET BY MOUTH EVERY DAY   blood glucose monitoring (ACCU-CHEK COMPACT PLUS) test strip Use to test blood sugar 3 times daily or as directed.  Ok to substitute alternative if insurance prefers.   OXcarbazepine (TRILEPTAL) 300 MG tablet Take 1 tablet (300 mg) by mouth 2 times daily For pain   gabapentin (NEURONTIN) 300 MG capsule Take 3 capsules (900 mg) by mouth 2 times daily For pain   meclizine (ANTIVERT) 12.5 MG tablet Take 1 tablet (12.5 mg) by mouth 3 times daily as needed for dizziness   aspirin 81 MG chewable tablet Take 1 tablet (81 mg) by mouth daily Starting 1 week after your kidney biopsy   cinacalcet (SENSIPAR) 30 MG tablet Take 1 tablet (30 mg) by mouth daily   cyanocobalamin (VITAMIN B12) 1000 MCG/ML injection Inject 1 mL (1,000 mcg) into the muscle every 30 days   order for DME Equipment being ordered: Silver Impregnated cathetersHX of frequent UTI   diphenoxylate-atropine (LOMOTIL) 2.5-0.025 MG per tablet Take 1 tablet by mouth 4 times daily as needed for diarrhea   TraMADol HCl 50 MG TBDP Take 50 mg by mouth 3 times daily as needed (Patient taking differently: Take 50 mg by mouth 2 times daily )   levothyroxine (SYNTHROID) 50 MCG tablet Take 1 tablet (50 mcg) by mouth daily   order for DME Equipment being ordered: [facility] CPAP interface (nasal pillows), size XS.   glucagon (GLUCAGON EMERGENCY) 1 MG injection Inject 1 mg into the muscle once   insulin pen needle 30G X 8 MM Use as directed with Lantus   ondansetron (ZOFRAN ODT) 4 MG disintegrating tablet  Take 1-2 tablets (4-8 mg) by mouth every 8 hours as needed for nausea   simethicone (MYLICON) 125 MG CHEW Take 1 tablet (125 mg) by mouth as needed for intestinal gas   Lactobacillus Rhamnosus, GG, (CULTURELL) capsule Take 1 capsule by mouth 2 times daily   order for DME Equipment being ordered: [facility]   Cranberry 400 MG TABS Take 400 mg by mouth 2 times daily   famotidine (PEPCID) 20 MG tablet Take 1 tablet (20 mg) by mouth 2 times daily   ORDER FOR DME Equipment being ordered: Johnson Catheters - 18 french   ACETAMINOPHEN PO Take 325-650 mg by mouth every 4 hours as needed.       Allergies:   Reviewed  Allergies   Allergen Reactions     Bactrim Ds [Sulfamethoxazole W/Trimethoprim]      Creatinine level increased     Atorvastatin Calcium      myalgias, muscle aches     Codeine Nausea and Vomiting     Darvocet [Propoxyphene N-Apap] Nausea and Vomiting     Hydromorphone      Levofloxacin Other (See Comments) and Diarrhea     hallucinations     Morphine      Nausea and vomiting     Niaspan [Niacin] GI Disturbance     Flushing even at low dose, GI upset     Percocet [Oxycodone-Acetaminophen]      Vomiting after taking med couple of times     Vicodin [Hydrocodone-Acetaminophen] Nausea and Vomiting       Social History:  - Lives with , who is primary care taker.  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   Social History     Social History     Marital status:      Spouse name: N/A     Number of children: N/A     Years of education: N/A     Occupational History     Not on file.     Social History Main Topics     Smoking status: Former Smoker     Years: 17.00     Quit date: 6/1/1987     Smokeless tobacco: Not on file     Alcohol use No     Drug use: No     Sexual activity: Yes     Partners: Male     Other Topics Concern     Not on file     Social History Narrative       Family History:  -  Family History   Problem Relation Age of Onset     DIABETES Mother      DIABETES Brother       Hypertension Father      [id number] pneumonia     HEART DISEASE Father          Exam:  Temp:  [100.6  F (38.1  C)] 100.6  F (38.1  C)  Pulse:  [85] 85  Heart Rate:  [87-96] 95  Resp:  [16] 16  BP: (113-205)/(51-98) 177/82  SpO2:  [93 %-99 %] 97 %  No intake or output data in the 24 hours ending 11/06/17 5280    General: Alert, not answering questions, nodding head a little bit, NAD  HEENT: Atraumatic, normocephalic, sclera anicteric  Neck: Supple  Resp: CTAB, no wheezes -- on RA  Cardiac: RRR, no murmur or extra heart sounds  Abdomen: Soft, nontender, nondistended. Active BS.   No CVA or suprapubic tenderness noted.  Extremities: No LE edema B/L, good peripheral pulses and capillary refill  Skin: Warm and dry, no jaundice or rash  Neuro and Psych: Alert, not answering questions, nodding head a little bit yes/no.  Was able to squeeze my finger on both arms.      Labs/Imaging reviewed in the EMR.    Assessment/ Plan:  Gem Jade is a 72 yo F with a history of kidney transplant (1999), neurogenic bladder with chronic rico, recurrent UTI's who was brought to the ED by her  for altered mental status.      # Acute encephalopathy  - Likely 2/2 UTI given previous hx.  CT head negative.  No obvious electrolyte abnormalities.  Low suspicion for acid/base, thyroid issues.  Was hypertensive to 200s in the ED but low suspicion for PRES, BP came down nicely with 1 time hydralazine.     # UTI  - She has had almost 6 UTI in the last 12 months.   Likely 2/2 to chronic rico.  Hx of ESLB in Feb 2016 but since then has grown sensitive organisms.  Will treat with zosyn.  Narrow based on culture.  - Likely is a candidate for chronic suppressive therapy.   - Consider sending FYI page to renal tx team in the AM, no reason to consult at this point.  Given no ROB, will not get further renal imaging at this point.    CHRONIC ISSUES  # HTN - Likely 2/2 to not taking clonidine/labetalol for the last 2 days.  PRN IV hydral for now.   Home meds to restart from the morning, I'm expecting she will be able to swallow safely tomorrow.  # DM - glargine 24 and aspart 10 with meals at home, here will do glargine 12 and SSI.   # Kidney tx - continue tacro and prednisone.  # Holding: oxcarbamezpaine, gabapentin, tramadol, meclizine, ondansetron, loratidine, simethicone.       FEN: NPO, maintenance D5+1/2NS+20K for now.  q4 BG checks while NPO  Prophy: heparin   Code Status: full code    Disposition Plan   Expected discharge: 2 - 3 days, recommended to prior living arrangement once mental status improved.    Patient was discussed with attending Dr. Grant Licea DO, MS  PGY-2, Internal Medicine Resident  Pager

## 2017-11-07 NOTE — PROGRESS NOTES
D. Per MD note: 72 yo F with a history of kidney transplant (), neurogenic bladder with chronic rico, recurrent UTI's who was brought to the ED by her  for altered mental status.  Patient at baseline is bedbound for the last 6 years 2/2 vertigo, charcot foot, OA etc.  Her mental status is sharp, per .  However, whenever she has an UTI she becomes very sleepy and unresponsive.   3 weeks ago she was diagnosed with UTI for which she was treated with 2 weeks of augmentin, she finished course last Wednesday.  Since last Friday she started acting more sleepy, and since  she hasn't been eating or drinking anything, including her medications.   Her  has tried to keep giving her water to keep her hydrated.    WOC consult to assess coccyx. Patient with intact skin at sacrum, coccyx and buttocks, Skin is jillian red and blanchable. Epidermis intact.  I. 15 minutes face to face with patient assessing skin at sacrum, coccyx and buttocks, Applied No Sting Barrier to Buttocks and Denise Cleft, reapplied protective Mepilex dressing.   A. Skin color changes at buttocks and  cleft consistent to chronic exposure to moisture/urinary incontinence.   P. Plan of care for bilateral buttocks/sacrum; Nursing to Apply No Sting Barrier to Bilateral Buttocks/Sacrum daily after bathing, let dry and place Sacral Mepilex dressing to protect coccyx from shear injury. No further WOC f/u planned.

## 2017-11-08 LAB
ANION GAP SERPL CALCULATED.3IONS-SCNC: 10 MMOL/L (ref 3–14)
BACTERIA SPEC CULT: ABNORMAL
BUN SERPL-MCNC: 40 MG/DL (ref 7–30)
CALCIUM SERPL-MCNC: 7.9 MG/DL (ref 8.5–10.1)
CHLORIDE SERPL-SCNC: 106 MMOL/L (ref 94–109)
CO2 SERPL-SCNC: 23 MMOL/L (ref 20–32)
CREAT SERPL-MCNC: 1.47 MG/DL (ref 0.52–1.04)
ERYTHROCYTE [DISTWIDTH] IN BLOOD BY AUTOMATED COUNT: 14.2 % (ref 10–15)
GFR SERPL CREATININE-BSD FRML MDRD: 35 ML/MIN/1.7M2
GLUCOSE BLDC GLUCOMTR-MCNC: 149 MG/DL (ref 70–99)
GLUCOSE BLDC GLUCOMTR-MCNC: 152 MG/DL (ref 70–99)
GLUCOSE BLDC GLUCOMTR-MCNC: 175 MG/DL (ref 70–99)
GLUCOSE BLDC GLUCOMTR-MCNC: 182 MG/DL (ref 70–99)
GLUCOSE BLDC GLUCOMTR-MCNC: 288 MG/DL (ref 70–99)
GLUCOSE SERPL-MCNC: 180 MG/DL (ref 70–99)
HCT VFR BLD AUTO: 34.5 % (ref 35–47)
HGB BLD-MCNC: 10.5 G/DL (ref 11.7–15.7)
INTERPRETATION ECG - MUSE: NORMAL
INTERPRETATION ECG - MUSE: NORMAL
Lab: ABNORMAL
MAGNESIUM SERPL-MCNC: 2 MG/DL (ref 1.6–2.3)
MCH RBC QN AUTO: 29.7 PG (ref 26.5–33)
MCHC RBC AUTO-ENTMCNC: 30.4 G/DL (ref 31.5–36.5)
MCV RBC AUTO: 98 FL (ref 78–100)
PLATELET # BLD AUTO: 142 10E9/L (ref 150–450)
POTASSIUM BLD-SCNC: 4.3 MMOL/L (ref 3.4–5.3)
POTASSIUM SERPL-SCNC: 3 MMOL/L (ref 3.4–5.3)
RBC # BLD AUTO: 3.53 10E12/L (ref 3.8–5.2)
SODIUM SERPL-SCNC: 139 MMOL/L (ref 133–144)
SPECIMEN SOURCE: ABNORMAL
TACROLIMUS BLD-MCNC: 5.7 UG/L (ref 5–15)
TME LAST DOSE: NORMAL H
WBC # BLD AUTO: 6.2 10E9/L (ref 4–11)

## 2017-11-08 PROCEDURE — 25000125 ZZHC RX 250: Performed by: INTERNAL MEDICINE

## 2017-11-08 PROCEDURE — 36415 COLL VENOUS BLD VENIPUNCTURE: CPT | Performed by: HOSPITALIST

## 2017-11-08 PROCEDURE — 80048 BASIC METABOLIC PNL TOTAL CA: CPT | Performed by: HOSPITALIST

## 2017-11-08 PROCEDURE — 25000131 ZZH RX MED GY IP 250 OP 636 PS 637: Mod: GY | Performed by: INTERNAL MEDICINE

## 2017-11-08 PROCEDURE — 25000128 H RX IP 250 OP 636: Performed by: STUDENT IN AN ORGANIZED HEALTH CARE EDUCATION/TRAINING PROGRAM

## 2017-11-08 PROCEDURE — 84132 ASSAY OF SERUM POTASSIUM: CPT | Performed by: INTERNAL MEDICINE

## 2017-11-08 PROCEDURE — 99233 SBSQ HOSP IP/OBS HIGH 50: CPT | Mod: GC | Performed by: INTERNAL MEDICINE

## 2017-11-08 PROCEDURE — 36415 COLL VENOUS BLD VENIPUNCTURE: CPT | Performed by: INTERNAL MEDICINE

## 2017-11-08 PROCEDURE — 25000128 H RX IP 250 OP 636: Performed by: INTERNAL MEDICINE

## 2017-11-08 PROCEDURE — 85027 COMPLETE CBC AUTOMATED: CPT | Performed by: HOSPITALIST

## 2017-11-08 PROCEDURE — 25000132 ZZH RX MED GY IP 250 OP 250 PS 637: Mod: GY | Performed by: INTERNAL MEDICINE

## 2017-11-08 PROCEDURE — A9270 NON-COVERED ITEM OR SERVICE: HCPCS | Mod: GY | Performed by: HOSPITALIST

## 2017-11-08 PROCEDURE — 12000001 ZZH R&B MED SURG/OB UMMC

## 2017-11-08 PROCEDURE — 25000128 H RX IP 250 OP 636: Performed by: HOSPITALIST

## 2017-11-08 PROCEDURE — 25000132 ZZH RX MED GY IP 250 OP 250 PS 637: Mod: GY | Performed by: HOSPITALIST

## 2017-11-08 PROCEDURE — A9270 NON-COVERED ITEM OR SERVICE: HCPCS | Mod: GY | Performed by: INTERNAL MEDICINE

## 2017-11-08 PROCEDURE — 83735 ASSAY OF MAGNESIUM: CPT | Performed by: HOSPITALIST

## 2017-11-08 PROCEDURE — 80197 ASSAY OF TACROLIMUS: CPT | Performed by: HOSPITALIST

## 2017-11-08 PROCEDURE — 00000146 ZZHCL STATISTIC GLUCOSE BY METER IP

## 2017-11-08 RX ORDER — POTASSIUM CHLORIDE 750 MG/1
20-40 TABLET, EXTENDED RELEASE ORAL
Status: DISCONTINUED | OUTPATIENT
Start: 2017-11-08 | End: 2017-11-09 | Stop reason: HOSPADM

## 2017-11-08 RX ORDER — POTASSIUM CL/LIDO/0.9 % NACL 10MEQ/0.1L
10 INTRAVENOUS SOLUTION, PIGGYBACK (ML) INTRAVENOUS
Status: DISCONTINUED | OUTPATIENT
Start: 2017-11-08 | End: 2017-11-09 | Stop reason: HOSPADM

## 2017-11-08 RX ORDER — POTASSIUM CHLORIDE 29.8 MG/ML
20 INJECTION INTRAVENOUS
Status: DISCONTINUED | OUTPATIENT
Start: 2017-11-08 | End: 2017-11-08 | Stop reason: RX

## 2017-11-08 RX ORDER — POTASSIUM CHLORIDE 7.45 MG/ML
10 INJECTION INTRAVENOUS
Status: DISCONTINUED | OUTPATIENT
Start: 2017-11-08 | End: 2017-11-09 | Stop reason: HOSPADM

## 2017-11-08 RX ORDER — CIPROFLOXACIN 500 MG/1
500 TABLET, FILM COATED ORAL EVERY 12 HOURS SCHEDULED
Status: DISCONTINUED | OUTPATIENT
Start: 2017-11-08 | End: 2017-11-09 | Stop reason: HOSPADM

## 2017-11-08 RX ORDER — POTASSIUM CHLORIDE 1.5 G/1.58G
20-40 POWDER, FOR SOLUTION ORAL
Status: DISCONTINUED | OUTPATIENT
Start: 2017-11-08 | End: 2017-11-09 | Stop reason: HOSPADM

## 2017-11-08 RX ORDER — LABETALOL HYDROCHLORIDE 5 MG/ML
10 INJECTION, SOLUTION INTRAVENOUS ONCE
Status: COMPLETED | OUTPATIENT
Start: 2017-11-08 | End: 2017-11-09

## 2017-11-08 RX ADMIN — CLONIDINE HYDROCHLORIDE 0.1 MG: 0.1 TABLET ORAL at 19:41

## 2017-11-08 RX ADMIN — NYSTATIN: 100000 CREAM TOPICAL at 20:46

## 2017-11-08 RX ADMIN — CIPROFLOXACIN HYDROCHLORIDE 500 MG: 500 TABLET, FILM COATED ORAL at 11:16

## 2017-11-08 RX ADMIN — HYDRALAZINE HYDROCHLORIDE 10 MG: 20 INJECTION INTRAMUSCULAR; INTRAVENOUS at 22:24

## 2017-11-08 RX ADMIN — CLONIDINE HYDROCHLORIDE 0.1 MG: 0.1 TABLET ORAL at 08:43

## 2017-11-08 RX ADMIN — PREDNISONE 5 MG: 5 TABLET ORAL at 08:33

## 2017-11-08 RX ADMIN — HEPARIN SODIUM 5000 UNITS: 5000 INJECTION, SOLUTION INTRAVENOUS; SUBCUTANEOUS at 08:35

## 2017-11-08 RX ADMIN — PIPERACILLIN AND TAZOBACTAM 4.5 G: 4; .5 INJECTION, POWDER, LYOPHILIZED, FOR SOLUTION INTRAVENOUS; PARENTERAL at 04:37

## 2017-11-08 RX ADMIN — POTASSIUM CHLORIDE 40 MEQ: 750 TABLET, EXTENDED RELEASE ORAL at 10:20

## 2017-11-08 RX ADMIN — INSULIN ASPART 1 UNITS: 100 INJECTION, SOLUTION INTRAVENOUS; SUBCUTANEOUS at 17:09

## 2017-11-08 RX ADMIN — CINACALCET HYDROCHLORIDE 30 MG: 30 TABLET, COATED ORAL at 08:33

## 2017-11-08 RX ADMIN — NYSTATIN: 100000 CREAM TOPICAL at 08:35

## 2017-11-08 RX ADMIN — INSULIN GLARGINE 12 UNITS: 100 INJECTION, SOLUTION SUBCUTANEOUS at 21:53

## 2017-11-08 RX ADMIN — INSULIN ASPART 1 UNITS: 100 INJECTION, SOLUTION INTRAVENOUS; SUBCUTANEOUS at 04:39

## 2017-11-08 RX ADMIN — INSULIN ASPART 1 UNITS: 100 INJECTION, SOLUTION INTRAVENOUS; SUBCUTANEOUS at 08:36

## 2017-11-08 RX ADMIN — HEPARIN SODIUM 5000 UNITS: 5000 INJECTION, SOLUTION INTRAVENOUS; SUBCUTANEOUS at 21:50

## 2017-11-08 RX ADMIN — INSULIN ASPART 1 UNITS: 100 INJECTION, SOLUTION INTRAVENOUS; SUBCUTANEOUS at 11:26

## 2017-11-08 RX ADMIN — TACROLIMUS 3.5 MG: 1 CAPSULE ORAL at 21:50

## 2017-11-08 RX ADMIN — Medication 150 MG: at 08:34

## 2017-11-08 RX ADMIN — INSULIN ASPART 3 UNITS: 100 INJECTION, SOLUTION INTRAVENOUS; SUBCUTANEOUS at 00:09

## 2017-11-08 RX ADMIN — FAMOTIDINE 20 MG: 20 TABLET ORAL at 08:33

## 2017-11-08 RX ADMIN — SODIUM CHLORIDE, POTASSIUM CHLORIDE, SODIUM LACTATE AND CALCIUM CHLORIDE 500 ML: 600; 310; 30; 20 INJECTION, SOLUTION INTRAVENOUS at 10:21

## 2017-11-08 RX ADMIN — Medication 150 MG: at 19:42

## 2017-11-08 RX ADMIN — LEVOTHYROXINE SODIUM 50 MCG: 25 TABLET ORAL at 08:33

## 2017-11-08 RX ADMIN — ASPIRIN 81 MG CHEWABLE TABLET 81 MG: 81 TABLET CHEWABLE at 08:33

## 2017-11-08 RX ADMIN — FAMOTIDINE 20 MG: 20 TABLET ORAL at 19:42

## 2017-11-08 RX ADMIN — PRAVASTATIN SODIUM 10 MG: 10 TABLET ORAL at 08:33

## 2017-11-08 RX ADMIN — CIPROFLOXACIN HYDROCHLORIDE 500 MG: 500 TABLET, FILM COATED ORAL at 19:41

## 2017-11-08 RX ADMIN — POTASSIUM CHLORIDE 20 MEQ: 750 TABLET, EXTENDED RELEASE ORAL at 14:38

## 2017-11-08 RX ADMIN — TACROLIMUS 3.5 MG: 1 CAPSULE ORAL at 08:34

## 2017-11-08 ASSESSMENT — PAIN DESCRIPTION - DESCRIPTORS: DESCRIPTORS: ACHING

## 2017-11-08 ASSESSMENT — ACTIVITIES OF DAILY LIVING (ADL)
ADLS_ACUITY_SCORE: 23

## 2017-11-08 NOTE — PROGRESS NOTES
[facility] Progress Note      Patient: Gem Jade  Date of Admission: 11/6/2017  Hospital Day: # 1    Date of Service: 11/8/2017    Assessment & Plan:  Gem Jade is a 70 yo F with a history of kidney transplant (1999), neurogenic bladder with chronic rico and recurrent UTI's who was brought to the ED by her  for altered mental status.       # acute toxic/metabolic encephalopathy, improving  Likely secondary to UTI.  CT head negative.  No obvious electrolyte abnormalities.  - hold sedating medications     # sepsis 2/2 rico associated UTI  Multiple UT's due to chronic indwelling rico.  History of ESBL but last in 2/2016 and has more recently grown sensitive organisms.  Febrile with leukocytosis on presentation with toxic/metabolic encephalopathy.  Repeat culture with > 100,000 citrobacter freundii.     - d/c zosyn  - start ciprofloxacin   - urology follow up after discharge    # HTN   - continue PTA labetalol and clonidine    # DM   - glargine 12 units QHS (reduced from home dose)  - SSI  - hypoglycemic protocol    # hx of kidney transplant  Tacro level of 5.7.   - continue PTA tacro and prednisone    FEN: regular diet  GI PPx: none needed  DVT PPx: heparin   CODE: full   Disposition: d/c home tomorrow    Patient was seen and discussed with Dr. Webber.     Brittani Ortiz MD, MS  Internal Medicine, PGY-2  Pager: [phone]    ___________________________________________________________________    Subjective:  Notes bilateral jaw pain this AM that is worse with chewing.  No headache.  No chest pain.  No shortness of breath.  No abdominal pain.  Nursing notes reviewed.  4 - point ROS otherwise negative.      Medications:  Current Facility-Administered Medications   Medication     potassium chloride SA (K-DUR/KLOR-CON M) CR tablet 20-40 mEq     potassium chloride (KLOR-CON) Packet 20-40 mEq     potassium chloride 10 mEq in 100 mL sterile water intermittent infusion (premix)     potassium chloride 10 mEq in 100 mL  "intermittent infusion with 10 mg lidocaine     ciprofloxacin (CIPRO) tablet 500 mg     aspirin chewable tablet 81 mg     cinacalcet (SENSIPAR) tablet 30 mg     cloNIDine (CATAPRES) tablet 0.1 mg     famotidine (PEPCID) tablet 20 mg     labetalol (NORMODYNE) half-tab 150 mg     levothyroxine (SYNTHROID/LEVOTHROID) tablet 50 mcg     pravastatin (PRAVACHOL) tablet 10 mg     predniSONE (DELTASONE) tablet 5 mg     tacrolimus (GENERIC EQUIVALENT) 3.5 mg     naloxone (NARCAN) injection 0.1-0.4 mg     heparin sodium PF injection 5,000 Units     acetaminophen (TYLENOL) tablet 650 mg     senna-docusate (SENOKOT-S;PERICOLACE) 8.6-50 MG per tablet 1 tablet    Or     senna-docusate (SENOKOT-S;PERICOLACE) 8.6-50 MG per tablet 2 tablet     polyethylene glycol (MIRALAX/GLYCOLAX) Packet 17 g     ondansetron (ZOFRAN-ODT) ODT tab 4 mg    Or     ondansetron (ZOFRAN) injection 4 mg     glucose 40 % gel 15-30 g    Or     dextrose 50 % injection 25-50 mL    Or     glucagon injection 1 mg     insulin glargine (LANTUS) injection 12 Units     nystatin (MYCOSTATIN) cream     influenza Vac Split High-Dose (FLUZONE) injection 0.5 mL     0.9% sodium chloride BOLUS     hydrALAZINE (APRESOLINE) injection 10 mg     insulin aspart (NovoLOG) inj (RAPID ACTING)       Vitals:  Blood pressure 172/52, pulse 81, temperature 97.1  F (36.2  C), temperature source Oral, resp. rate 16, height 1.676 m (5' 6\"), weight 103.5 kg (228 lb 2.8 oz), SpO2 96 %, not currently breastfeeding.    Physical Exam:  General  - laying bed, no acute distress  HEENT - NC/AT, no scleral icterus, mmm  Cardiovascular - RRR, normal S1/S2, no murmur appreciated  Pulmonary - CTAB, no wheezing or crackles appreciated, normal respiratory effort on room air  Abdomen - soft, non-tender, non-distended   - indwelling rico   Extremities - warm and well perfused, no peripheral edema in bilateral lower extremities    Labs:  Reviewed.     "

## 2017-11-08 NOTE — PLAN OF CARE
Problem: Patient Care Overview  Goal: Plan of Care/Patient Progress Review  Pt A&O X4, forgetful, occasional difficulty with word finding. T-max 99 (down to 97.9 with Tylenol X1), HR sinus rhythm 60-80's, BP's 140/40's, O2 sats > 90% on 2LPM via NC overnight, RA during days. LS clear/diminished, BS+ X4, CMS intact. Pt c/o of jaw pain, received PRN Tylenol X1 with no relief. PIV in R hand patent & infusing @ TKO between antibiotics, PIV in L arm patent & SL. Johnson patent & outputting urine minimally, MD's aware, passing gas, had one formed BM overnight. Tolerating regular diet with no reported nausea/emesis. Interdry sheets in place to pannus, Mepilex in place to coccyx. Repositioned Q2hrs. Call light within reach, able to make needs known, will continue to monitor per POC.

## 2017-11-08 NOTE — PLAN OF CARE
"Problem: Patient Care Overview  Goal: Plan of Care/Patient Progress Review  Outcome: Improving  /58 (BP Location: Right arm)  Pulse 81  Temp 98.3  F (36.8  C) (Oral)  Resp 16  Ht 1.676 m (5' 6\")  Wt 103.5 kg (228 lb 2.8 oz)  SpO2 100%  BMI 36.83 kg/m2     A&Ox4. Difficulty word finding.  SR.  HR 70s.  Diminished lung sounds in bases and currently on room air. No SOB report.  Active bowel sounds, no BM on shift.  Bolus given for low urine output.  Encouraging patient to drink fluids.  Repositioned every 2 hours.  On regular diet, no nausea or vomiting.  Appetite poor, refused breakfast.  40 mEq of potassium given, needs 20 mEq more.  Nystatin placed in groin area. Report given to 5A nurse.  Continue to monitor      "

## 2017-11-08 NOTE — PLAN OF CARE
"Problem: Patient Care Overview  Goal: Plan of Care/Patient Progress Review  Outcome: Improving  /70 (BP Location: Right arm)  Pulse 85  Temp 99.4  F (37.4  C) (Oral)  Resp 20  Ht 1.676 m (5' 6\")  Wt 101 kg (222 lb 10.6 oz)  SpO2 100%  BMI 35.94 kg/m2  A&Ox4 but forgetful.  Difficulty word finding.  SR.  HR 70s.  EKG done at bedside due to patient complaining of chest pain, MD notified.  Diminished lung sounds and currently on room air. Previously requiring 2L NC. No SOB report.  Active bowel sounds, no BM on shift.  Johnson draining minimal amounts of cloudy urine, MD notified.  Encouraging patient to drink fluids.  Repositioned every 2 hours.  On regular diet, no nausea or vomiting.  Appetite poor, refused meals.  Nystatin placed in groin area.  Lactic acid 1.3. Family at bedside.  Continue to monitor.       "

## 2017-11-09 VITALS
BODY MASS INDEX: 36.67 KG/M2 | DIASTOLIC BLOOD PRESSURE: 56 MMHG | SYSTOLIC BLOOD PRESSURE: 163 MMHG | HEIGHT: 66 IN | OXYGEN SATURATION: 98 % | TEMPERATURE: 97.5 F | HEART RATE: 81 BPM | WEIGHT: 228.18 LBS | RESPIRATION RATE: 16 BRPM

## 2017-11-09 DIAGNOSIS — E11.42 DIABETIC POLYNEUROPATHY ASSOCIATED WITH TYPE 2 DIABETES MELLITUS (H): ICD-10-CM

## 2017-11-09 LAB
ANION GAP SERPL CALCULATED.3IONS-SCNC: 8 MMOL/L (ref 3–14)
BUN SERPL-MCNC: 32 MG/DL (ref 7–30)
CALCIUM SERPL-MCNC: 8.1 MG/DL (ref 8.5–10.1)
CHLORIDE SERPL-SCNC: 106 MMOL/L (ref 94–109)
CO2 SERPL-SCNC: 22 MMOL/L (ref 20–32)
CREAT SERPL-MCNC: 1.52 MG/DL (ref 0.52–1.04)
GFR SERPL CREATININE-BSD FRML MDRD: 34 ML/MIN/1.7M2
GLUCOSE BLDC GLUCOMTR-MCNC: 125 MG/DL (ref 70–99)
GLUCOSE BLDC GLUCOMTR-MCNC: 142 MG/DL (ref 70–99)
GLUCOSE BLDC GLUCOMTR-MCNC: 159 MG/DL (ref 70–99)
GLUCOSE BLDC GLUCOMTR-MCNC: 191 MG/DL (ref 70–99)
GLUCOSE SERPL-MCNC: 122 MG/DL (ref 70–99)
PLATELET # BLD AUTO: 151 10E9/L (ref 150–450)
POTASSIUM SERPL-SCNC: 3.7 MMOL/L (ref 3.4–5.3)
SODIUM SERPL-SCNC: 137 MMOL/L (ref 133–144)

## 2017-11-09 PROCEDURE — 99238 HOSP IP/OBS DSCHRG MGMT 30/<: CPT | Mod: GC | Performed by: INTERNAL MEDICINE

## 2017-11-09 PROCEDURE — 00000146 ZZHCL STATISTIC GLUCOSE BY METER IP

## 2017-11-09 PROCEDURE — 25000132 ZZH RX MED GY IP 250 OP 250 PS 637: Mod: GY | Performed by: HOSPITALIST

## 2017-11-09 PROCEDURE — 36415 COLL VENOUS BLD VENIPUNCTURE: CPT | Performed by: HOSPITALIST

## 2017-11-09 PROCEDURE — 25000128 H RX IP 250 OP 636: Performed by: INTERNAL MEDICINE

## 2017-11-09 PROCEDURE — 25000131 ZZH RX MED GY IP 250 OP 636 PS 637: Mod: GY | Performed by: INTERNAL MEDICINE

## 2017-11-09 PROCEDURE — 25000128 H RX IP 250 OP 636: Performed by: HOSPITALIST

## 2017-11-09 PROCEDURE — A9270 NON-COVERED ITEM OR SERVICE: HCPCS | Mod: GY | Performed by: HOSPITALIST

## 2017-11-09 PROCEDURE — 25000125 ZZHC RX 250: Performed by: INTERNAL MEDICINE

## 2017-11-09 PROCEDURE — 25000132 ZZH RX MED GY IP 250 OP 250 PS 637: Mod: GY | Performed by: INTERNAL MEDICINE

## 2017-11-09 PROCEDURE — 85049 AUTOMATED PLATELET COUNT: CPT | Performed by: HOSPITALIST

## 2017-11-09 PROCEDURE — 84132 ASSAY OF SERUM POTASSIUM: CPT | Performed by: HOSPITALIST

## 2017-11-09 PROCEDURE — 25000128 H RX IP 250 OP 636: Performed by: STUDENT IN AN ORGANIZED HEALTH CARE EDUCATION/TRAINING PROGRAM

## 2017-11-09 PROCEDURE — A9270 NON-COVERED ITEM OR SERVICE: HCPCS | Mod: GY | Performed by: INTERNAL MEDICINE

## 2017-11-09 PROCEDURE — 80048 BASIC METABOLIC PNL TOTAL CA: CPT | Performed by: HOSPITALIST

## 2017-11-09 RX ORDER — CIPROFLOXACIN 500 MG/1
500 TABLET, FILM COATED ORAL EVERY 12 HOURS
Qty: 14 TABLET | Refills: 0 | Status: ON HOLD | OUTPATIENT
Start: 2017-11-09 | End: 2017-11-19

## 2017-11-09 RX ORDER — MECLIZINE HCL 12.5 MG 12.5 MG/1
12.5 TABLET ORAL 3 TIMES DAILY PRN
Qty: 30 TABLET | Refills: 0 | Status: ON HOLD | OUTPATIENT
Start: 2017-11-09 | End: 2018-01-25

## 2017-11-09 RX ORDER — DIPHENOXYLATE HCL/ATROPINE 2.5-.025MG
1 TABLET ORAL 4 TIMES DAILY PRN
Qty: 30 TABLET | Refills: 0 | Status: SHIPPED | OUTPATIENT
Start: 2017-11-09 | End: 2018-02-19

## 2017-11-09 RX ORDER — GABAPENTIN 300 MG/1
900 CAPSULE ORAL 2 TIMES DAILY
Qty: 180 CAPSULE | Refills: 3 | Status: SHIPPED | OUTPATIENT
Start: 2017-11-09 | End: 2018-01-08

## 2017-11-09 RX ADMIN — LEVOTHYROXINE SODIUM 50 MCG: 25 TABLET ORAL at 08:37

## 2017-11-09 RX ADMIN — TACROLIMUS 3.5 MG: 1 CAPSULE ORAL at 08:34

## 2017-11-09 RX ADMIN — HEPARIN SODIUM 5000 UNITS: 5000 INJECTION, SOLUTION INTRAVENOUS; SUBCUTANEOUS at 09:42

## 2017-11-09 RX ADMIN — ASPIRIN 81 MG CHEWABLE TABLET 81 MG: 81 TABLET CHEWABLE at 08:34

## 2017-11-09 RX ADMIN — Medication 10 MG: at 00:56

## 2017-11-09 RX ADMIN — CLONIDINE HYDROCHLORIDE 0.1 MG: 0.1 TABLET ORAL at 08:37

## 2017-11-09 RX ADMIN — NYSTATIN: 100000 CREAM TOPICAL at 09:42

## 2017-11-09 RX ADMIN — PREDNISONE 5 MG: 5 TABLET ORAL at 08:36

## 2017-11-09 RX ADMIN — CINACALCET HYDROCHLORIDE 30 MG: 30 TABLET, COATED ORAL at 08:34

## 2017-11-09 RX ADMIN — FAMOTIDINE 20 MG: 20 TABLET ORAL at 08:37

## 2017-11-09 RX ADMIN — Medication 150 MG: at 08:34

## 2017-11-09 RX ADMIN — SODIUM CHLORIDE, POTASSIUM CHLORIDE, SODIUM LACTATE AND CALCIUM CHLORIDE 500 ML: 600; 310; 30; 20 INJECTION, SOLUTION INTRAVENOUS at 11:09

## 2017-11-09 RX ADMIN — CIPROFLOXACIN HYDROCHLORIDE 500 MG: 500 TABLET, FILM COATED ORAL at 08:34

## 2017-11-09 RX ADMIN — PRAVASTATIN SODIUM 10 MG: 10 TABLET ORAL at 08:34

## 2017-11-09 NOTE — PROGRESS NOTES
Care Coordinator Progress Note     Admission Date/Time:  11/6/2017  Attending MD:  Christiano Webber MD     Data  Chart reviewed, discussed with interdisciplinary team.   Patient was admitted for:    Sepsis, due to unspecified organism (H)  Acute cystitis without hematuria.    Concerns with insurance coverage for discharge needs: None; Medicare and [employer]  Current Living Situation: Patient lives with spouse (Chang is patients primary care giver)  Support System: Supportive and Involved; spouse and daughter  Services Involved: DME and Home Care     RN Care Coordinator, Alexus Bassett at [facility]  Transportation: [facility] stretcher transport  Barriers to Discharge: chronically ill, dependent with mobility/activities of daily living, physical impairment and medical clearance    Coordination of Care  11/9: Per M5 Team yesterday patient should follow up with Primary Care to determine of antibiotic prophylaxis for prevention of UTI due to the frequency of recurrence is warranted (writer will notify RNCC at [facility] Alexus Bassett).Confirmed discharge home with spouse today having received final  approval and transportation arrangement through [facility]. Stretcher transport 2PM.    11/7: Chart reviewed and plan of care discussed with MD team. Attempted to locate spouse in patients room twice today; did not connect with him (left message on home voicemail).    Patient admitted for altered mental status secondary to urosepsis. According to chart notes patient is completely dependent on spouse for all cares, bed bound, lives at home, has [facility] RN and [facility] services. Plan for patient to discharge when mentation and alertness level have returned to baseline (per husbands assessment as he is her primary care giver. Expect that patient will be discharged with antibiotic prophylaxis for prevention of UTI due to the frequency of recurrence.      RN Care Coordinator will continue to  follow for additional discharge needs that may arise.       Referrals: Provided patient/family with options for resumption of Home Care.       [facility]  Phone  [phone]  Fax  [phone]    Resumption of Skilled Nursing and Home Health Aide Services     Plan  Anticipated Discharge Date:  Pending resolution of altered mental status 2/2 UTI  Anticipated Discharge Plan:  Home with spouse    Kassandra DE PAZN RN PHN  Patient Care Management Coordinator for    [facility], [facility]A & 5B Off Service Patients  [facility]  Phone: [phone]  Pager: [phone]

## 2017-11-09 NOTE — PLAN OF CARE
Problem: Patient Care Overview  Goal: Plan of Care/Patient Progress Review  Outcome: Adequate for Discharge Date Met:  11/09/17  2550-8946: Vitals stable with some HTN but did not meet parameters for any PRN medications. PO antibiotics. Bedbound. Turn approximately q 2 hours and full bed bath from NST today. BG <150; no sliding scale given. Declined eating meals today. Pt verbalized understanding of DC instructions. PIV removed after LR bolus this morning. Meds from discharge pharmacy obtained for patient and sent with patient. [facility] took patient via stretcher around 1415 to home.  (caregiver) updated via telephone about discharge and about new oral antibiotic regimen.

## 2017-11-09 NOTE — PLAN OF CARE
Problem: Patient Care Overview  Goal: Plan of Care/Patient Progress Review  A&O. VSS on RA besides elevated BP. 1x dose 10mg IV labetalol given. Denies pain, but reports some discomfort w/ repositioning. LPIV. Johnson, good UOP. On PO cipro for UTI. No BM overnight. Incontinent of stool per report. Bed bound. Repo q2h. Able to make needs know. Possibly d/c today per MD note. Will continue to monitor and follow POC.

## 2017-11-09 NOTE — TELEPHONE ENCOUNTER
Controlled Substance Refill Request for Gabapentin 300 mg  Problem List Complete:     checked in past 6 months?  Yes 11/9/17   Last refill was on 10/3/17 for quantity of 180 with 3 refills by Dr Webber.    Routing to provider to complete.    Wendie Harris RN

## 2017-11-10 ENCOUNTER — CARE COORDINATION (OUTPATIENT)
Dept: CARE COORDINATION | Facility: CLINIC | Age: 72
End: 2017-11-10

## 2017-11-10 NOTE — PROGRESS NOTES
Patient was called three times and no answer so post 24 hr DC follow up calls will be closed out since it is Friday

## 2017-11-11 NOTE — DISCHARGE SUMMARY
Medicine Discharge Summary  Gem Jade MRN: [id number]  1945  Primary care provider: Marivel Barcenas  ___________________________________          Date of Admission:  11/6/2017  Date of Discharge:  11/9/2017   Admitting Physician:  Sravani Burns MD  Discharge Physician:  Christiano Webber MD  Discharging Service:  Internal Medicine, [place] [address]     Primary Provider: Marivel Barcenas         Reason for Admission:   Gem Jade is a 70 yo F with a history of kidney transplant (1999), neurogenic bladder with chronic rico and recurrent UTI's who was brought to the ED by her  for altered mental status.            Discharge Diagnosis:   Acute toxic/metabolic encephalopathy  Sepsis 2/2 rico associated UTI         Procedures & Significant Findings:   Urine Cx - > 100,000 Citrobacter freundii complex    CT head (11/6) -   No acute intracranial pathology.    CXR (11/6) -   1. No acute airspace opacities. Mild pulmonary vascular congestion.  2. Enlarged cardiomediastinal silhouette, slightly progressed from the prior study.         Consultations:   none         Hospital Course by Problem:    # Acute toxic/metabolic encephalopathy  Altered on presentation.  CT head negative.  Improved with treatment of UTI below.      # Sepsis 2/2 rico associated UTI  History of recurrent UTI's due to chronic indwelling rico.  Febrile with leukocytosis on presentation with toxic/metabolic encephalopathy.  Repeat culture with > 100,000 citrobacter freundii.  Initially started on zoysn, but transitioned to ciprofloxacin for discharge once sensitivities results.  Rico was exchanged.  Consideration of long term suppressive antibiotics has been considered in the past, but was not pursued do to concerns for drug resistant c diff colitis.  Patient also has been unable to follow up with Urology due to mobility and cost of attending a clinic  "appointment.  Patient will follow up with home care provider.      Physical Exam on day of Discharge:  Blood pressure 163/56, pulse 81, temperature 97.5  F (36.4  C), temperature source Oral, resp. rate 16, height 1.676 m (5' 6\"), weight 103.5 kg (228 lb 2.8 oz), SpO2 98 %, not currently breastfeeding.  neral  - laying bed, no acute distress  HEENT - NC/AT, no scleral icterus, mmm  Pulmonary - CTAB, no wheezing or crackles appreciated, normal respiratory effort on room air  Abdomen - soft, non-tender, non-distended   - indwelling rico   Extremities - warm and well perfused, no peripheral edema in bilateral lower extremities         Pending Results:   none         Discharge Medications:     Discharge Medication List as of 11/9/2017  2:08 PM      START taking these medications    Details   ciprofloxacin (CIPRO) 500 MG tablet Take 1 tablet (500 mg) by mouth every 12 hours for 7 days, Disp-14 tablet, R-0, E-Prescribe         CONTINUE these medications which have CHANGED    Details   diphenoxylate-atropine (LOMOTIL) 2.5-0.025 MG per tablet Take 1 tablet by mouth 4 times daily as needed for diarrhea, Disp-30 tablet, R-0, Local Print      meclizine (ANTIVERT) 12.5 MG tablet Take 1 tablet (12.5 mg) by mouth 3 times daily as needed for dizziness, Disp-30 tablet, R-0, E-Prescribe         CONTINUE these medications which have NOT CHANGED    Details   tacrolimus (GENERIC EQUIVALENT) 1 MG capsule Take 3 capsules (3 mg) by mouth 2 times daily (total dose 3.5 mg twice daily), Disp-180 capsule, R-3, E-Prescribe      tacrolimus (GENERIC EQUIVALENT) 0.5 MG capsule Take 1 capsule (0.5 mg) by mouth 2 times daily (total dose 3.5 mg twice daily), Disp-60 capsule, R-3, E-Prescribe      cloNIDine (CATAPRES) 0.1 MG tablet TAKE ONE TABLET BY MOUTH TWICE A DAY, Disp-180 tablet, R-3, E-Prescribe      Cholecalciferol (VITAMIN D) 2000 UNITS tablet Take 4,000 Units by mouth 2 times daily, Disp-100 tablet, R-3, Historical      !! order for DME " High back guerrero wheel chair  MICHELLE 99  DX: morbid obesity, generalized weakness, physical deconditioning, chronic vertigoDisp-1 Device, R-0, Local Print      predniSONE (DELTASONE) 5 MG tablet Take 1 tablet (5 mg) by mouth daily, Disp-90 tablet, R-3, E-Prescribe      insulin glargine (LANTUS SOLOSTAR) 100 UNIT/ML injection Inject 24 units under the skin daily., Disp-15 mL, R-11, E-PrescribeHold on file for refills      insulin aspart (NOVOLOG FLEXPEN) 100 UNIT/ML injection Inject 10 units under the skin 2 times daily before meals as directed. Add sliding scale as needed. Avg use 25 units per day, Disp-15 mL, R-11, E-Prescribe      lidocaine (XYLOCAINE) 2 % topical gel Apply topically as needed for moderate pain 10 ML every 3 weeksDisp-30 mL, [id number]      labetalol (NORMODYNE) 300 MG tablet Take 1/2 tablet (150mg) BID. Hold for systolic BP less than 120., Disp-120 tablet, R-3, Historical      loratadine (CLARITIN) 10 MG tablet Take 1 tablet (10 mg) by mouth daily, Disp-90 tablet, R-3, E-Prescribe      pravastatin (PRAVACHOL) 10 MG tablet TAKE ONE TABLET BY MOUTH EVERY DAY, Disp-90 tablet, R-3, E-Prescribe      blood glucose monitoring (ACCU-CHEK COMPACT PLUS) test strip Use to test blood sugar 3 times daily or as directed.  Ok to substitute alternative if insurance prefers., Disp-100 strip, R-prn, Historical      OXcarbazepine (TRILEPTAL) 300 MG tablet Take 1 tablet (300 mg) by mouth 2 times daily For pain, Disp-180 tablet, R-3, Historical      gabapentin (NEURONTIN) 300 MG capsule Take 3 capsules (900 mg) by mouth 2 times daily For pain, Disp-180 capsule, R-3, Historical      aspirin 81 MG chewable tablet Take 1 tablet (81 mg) by mouth daily Starting 1 week after your kidney biopsy, Disp-90 tablet, R-3, E-Prescribe      cinacalcet (SENSIPAR) 30 MG tablet Take 1 tablet (30 mg) by mouth daily, Disp-60 tablet, R-3, E-Prescribe      cyanocobalamin (VITAMIN B12) 1000 MCG/ML injection Inject 1 mL (1,000 mcg) into  the muscle every 30 days, Disp-3 mL, R-3, E-Prescribe      !! order for DME Equipment being ordered: Silver Impregnated catheters  HX of frequent UTIDisp-12 each, R-1, Local Print      TraMADol HCl 50 MG TBDP Take 50 mg by mouth 3 times daily as needed, Disp-90 tablet, R-3, Local Print      levothyroxine (SYNTHROID) 50 MCG tablet Take 1 tablet (50 mcg) by mouth daily, Disp-90 tablet, R-3, E-Prescribe      !! order for DME Equipment being ordered: IroFit FX CPAP interface (nasal pillows), size XS.Disp-1 each, R-1, Local Print      glucagon (GLUCAGON EMERGENCY) 1 MG injection Inject 1 mg into the muscle once, Disp-1 each, R-11, Historical      insulin pen needle 30G X 8 MM Use as directed with LantusDisp-100 each, [id number]      ondansetron (ZOFRAN ODT) 4 MG disintegrating tablet Take 1-2 tablets (4-8 mg) by mouth every 8 hours as needed for nausea, Disp-20 tablet, R-5, E-Prescribe      simethicone (MYLICON) 125 MG CHEW Take 1 tablet (125 mg) by mouth as needed for intestinal gas, Disp-120 tablet, Historical      Lactobacillus Rhamnosus, GG, (CULTURELL) capsule Take 1 capsule by mouth 2 times daily, Disp-60 capsule, R-11, Local Print      !! order for DME Equipment being ordered: Blue Chux  Please send to Handi medicalDisp-100 pad, R-3, Local Print      Cranberry 400 MG TABS Take 400 mg by mouth 2 times daily, Historical      famotidine (PEPCID) 20 MG tablet Take 1 tablet (20 mg) by mouth 2 times daily, Disp-60 tablet, R-1, Historical      !! ORDER FOR DME Equipment being ordered: Johnson Catheters - 18 frenchDisp-3 catheter, R-PRN, Fax      ACETAMINOPHEN PO Take 325-650 mg by mouth every 4 hours as needed., Historical       !! - Potential duplicate medications found. Please discuss with provider.      STOP taking these medications       amoxicillin-clavulanate (AUGMENTIN) 500-125 MG per tablet Comments:   Reason for Stopping:                    Discharge Instructions and Follow-Up:     Discharge Procedure  Orders  Home care nursing referral   Referral Type: Home Health Therapies & Aides     Reason for your hospital stay   Order Comments: You were admitted with a catheter associated urinary tract infection.  You were started on antibiotics and should continue these antibiotics for 7 more days.  Follow up with your home care provider.     Adult [facility] Follow-up and recommended labs and tests   Order Comments: Follow up with primary care provider, Marivel Barcenas NP, within 7 days for hospital follow- up.  The following labs/tests are recommended: BMP, Mg, Phos.      Appointments on [place] and/or [facility] (with [facility] or [facility] provider or service). Call [phone] if you haven't heard regarding these appointments within 7 days of discharge.     Activity   Order Comments: Your activity upon discharge: activity as tolerated   Order Specific Question Answer Comments   Is discharge order? Yes      Full Code     Diet   Order Comments: Follow this diet upon discharge:      Regular Diet Adult   Order Specific Question Answer Comments   Is discharge order? Yes             Discharge Disposition:   home         Condition on Discharge:   Discharge condition: Stable   Code status on discharge: Full Code        Date of service: 11/9/2017    The patient was discussed with Dr. Webber.    Brittani Ortiz MD, MS  Internal Medicine, PGY-2

## 2017-11-12 LAB
BACTERIA SPEC CULT: NO GROWTH
BACTERIA SPEC CULT: NO GROWTH
SPECIMEN SOURCE: NORMAL
SPECIMEN SOURCE: NORMAL

## 2017-11-13 ENCOUNTER — OFFICE VISIT (OUTPATIENT)
Dept: FAMILY MEDICINE | Facility: CLINIC | Age: 72
End: 2017-11-13
Payer: COMMERCIAL

## 2017-11-13 ENCOUNTER — APPOINTMENT (OUTPATIENT)
Dept: GENERAL RADIOLOGY | Facility: CLINIC | Age: 72
DRG: 698 | End: 2017-11-13
Attending: EMERGENCY MEDICINE
Payer: MEDICARE

## 2017-11-13 ENCOUNTER — APPOINTMENT (OUTPATIENT)
Dept: CT IMAGING | Facility: CLINIC | Age: 72
DRG: 698 | End: 2017-11-13
Attending: EMERGENCY MEDICINE
Payer: MEDICARE

## 2017-11-13 ENCOUNTER — HOSPITAL ENCOUNTER (INPATIENT)
Facility: CLINIC | Age: 72
LOS: 6 days | Discharge: HOME HEALTH | DRG: 698 | End: 2017-11-19
Attending: EMERGENCY MEDICINE | Admitting: HOSPITALIST
Payer: MEDICARE

## 2017-11-13 ENCOUNTER — TELEPHONE (OUTPATIENT)
Dept: FAMILY MEDICINE | Facility: CLINIC | Age: 72
End: 2017-11-13

## 2017-11-13 DIAGNOSIS — R41.82 ALTERED MENTAL STATUS, UNSPECIFIED ALTERED MENTAL STATUS TYPE: ICD-10-CM

## 2017-11-13 DIAGNOSIS — Z94.0 KIDNEY TRANSPLANT RECIPIENT: ICD-10-CM

## 2017-11-13 DIAGNOSIS — Z79.60 LONG-TERM USE OF IMMUNOSUPPRESSANT MEDICATION: ICD-10-CM

## 2017-11-13 DIAGNOSIS — D84.9 IMMUNOSUPPRESSED STATUS (H): ICD-10-CM

## 2017-11-13 DIAGNOSIS — R19.7 DIARRHEA, UNSPECIFIED TYPE: Primary | ICD-10-CM

## 2017-11-13 DIAGNOSIS — Z09 HOSPITAL DISCHARGE FOLLOW-UP: Primary | ICD-10-CM

## 2017-11-13 DIAGNOSIS — R06.89 BRADYPNEA: ICD-10-CM

## 2017-11-13 DIAGNOSIS — Z94.0 KIDNEY REPLACED BY TRANSPLANT: ICD-10-CM

## 2017-11-13 LAB
ALBUMIN SERPL-MCNC: 3 G/DL (ref 3.4–5)
ALBUMIN UR-MCNC: 100 MG/DL
ALP SERPL-CCNC: 67 U/L (ref 40–150)
ALT SERPL W P-5'-P-CCNC: 11 U/L (ref 0–50)
AMPHETAMINES UR QL SCN: NEGATIVE
ANION GAP SERPL CALCULATED.3IONS-SCNC: 9 MMOL/L (ref 3–14)
APAP SERPL-MCNC: <2 MG/L (ref 10–20)
APPEARANCE UR: ABNORMAL
AST SERPL W P-5'-P-CCNC: 8 U/L (ref 0–45)
BACTERIA #/AREA URNS HPF: ABNORMAL /HPF
BARBITURATES UR QL: NEGATIVE
BASE DEFICIT BLDA-SCNC: 2.4 MMOL/L
BASE DEFICIT BLDV-SCNC: NORMAL MMOL/L
BASE EXCESS BLDV CALC-SCNC: NORMAL MMOL/L
BASOPHILS # BLD AUTO: 0 10E9/L (ref 0–0.2)
BASOPHILS NFR BLD AUTO: 0 %
BENZODIAZ UR QL: NEGATIVE
BILIRUB SERPL-MCNC: 0.2 MG/DL (ref 0.2–1.3)
BILIRUB UR QL STRIP: NEGATIVE
BUN SERPL-MCNC: 47 MG/DL (ref 7–30)
CALCIUM SERPL-MCNC: 7.4 MG/DL (ref 8.5–10.1)
CANNABINOIDS UR QL SCN: NEGATIVE
CHLORIDE SERPL-SCNC: 103 MMOL/L (ref 94–109)
CO2 BLDCOV-SCNC: 26 MMOL/L (ref 21–28)
CO2 SERPL-SCNC: 25 MMOL/L (ref 20–32)
COCAINE UR QL: NEGATIVE
COLOR UR AUTO: YELLOW
CREAT SERPL-MCNC: 2.06 MG/DL (ref 0.52–1.04)
DIFFERENTIAL METHOD BLD: ABNORMAL
EOSINOPHIL # BLD AUTO: 0.1 10E9/L (ref 0–0.7)
EOSINOPHIL NFR BLD AUTO: 2.3 %
ERYTHROCYTE [DISTWIDTH] IN BLOOD BY AUTOMATED COUNT: 13.7 % (ref 10–15)
ERYTHROCYTE [DISTWIDTH] IN BLOOD BY AUTOMATED COUNT: 13.7 % (ref 10–15)
GFR SERPL CREATININE-BSD FRML MDRD: 24 ML/MIN/1.7M2
GLUCOSE BLDC GLUCOMTR-MCNC: 229 MG/DL (ref 70–99)
GLUCOSE BLDC GLUCOMTR-MCNC: 247 MG/DL (ref 70–99)
GLUCOSE BLDC GLUCOMTR-MCNC: 260 MG/DL (ref 70–99)
GLUCOSE BLDC GLUCOMTR-MCNC: 293 MG/DL (ref 70–99)
GLUCOSE SERPL-MCNC: 294 MG/DL (ref 70–99)
GLUCOSE UR STRIP-MCNC: 500 MG/DL
HCO3 BLD-SCNC: 20 MMOL/L (ref 21–28)
HCO3 BLD-SCNC: 24 MMOL/L (ref 21–28)
HCO3 BLDV-SCNC: NORMAL MMOL/L (ref 21–28)
HCT VFR BLD AUTO: 32.8 % (ref 35–47)
HCT VFR BLD AUTO: 35.7 % (ref 35–47)
HGB BLD-MCNC: 10.3 G/DL (ref 11.7–15.7)
HGB BLD-MCNC: 11.3 G/DL (ref 11.7–15.7)
HGB UR QL STRIP: ABNORMAL
IMM GRANULOCYTES # BLD: 0.1 10E9/L (ref 0–0.4)
IMM GRANULOCYTES NFR BLD: 1.3 %
INTERPRETATION ECG - MUSE: NORMAL
KETONES UR STRIP-MCNC: NEGATIVE MG/DL
LACTATE BLD-SCNC: 0.8 MMOL/L (ref 0.7–2)
LEUKOCYTE ESTERASE UR QL STRIP: ABNORMAL
LYMPHOCYTES # BLD AUTO: 1 10E9/L (ref 0.8–5.3)
LYMPHOCYTES NFR BLD AUTO: 16 %
MCH RBC QN AUTO: 30.1 PG (ref 26.5–33)
MCH RBC QN AUTO: 30.3 PG (ref 26.5–33)
MCHC RBC AUTO-ENTMCNC: 31.4 G/DL (ref 31.5–36.5)
MCHC RBC AUTO-ENTMCNC: 31.7 G/DL (ref 31.5–36.5)
MCV RBC AUTO: 95 FL (ref 78–100)
MCV RBC AUTO: 97 FL (ref 78–100)
MONOCYTES # BLD AUTO: 0.4 10E9/L (ref 0–1.3)
MONOCYTES NFR BLD AUTO: 7.2 %
NEUTROPHILS # BLD AUTO: 4.4 10E9/L (ref 1.6–8.3)
NEUTROPHILS NFR BLD AUTO: 73.2 %
NITRATE UR QL: NEGATIVE
NRBC # BLD AUTO: 0 10*3/UL
NRBC BLD AUTO-RTO: 0 /100
O2/TOTAL GAS SETTING VFR VENT: 70 %
O2/TOTAL GAS SETTING VFR VENT: NORMAL %
OPIATES UR QL SCN: NEGATIVE
OXYHGB MFR BLD: 98 % (ref 92–100)
OXYHGB MFR BLD: 99 % (ref 92–100)
OXYHGB MFR BLDV: NORMAL %
PCO2 BLD: 26 MM HG (ref 35–45)
PCO2 BLD: 55 MM HG (ref 35–45)
PCO2 BLDV: 54 MM HG (ref 40–50)
PCO2 BLDV: NORMAL MM HG (ref 40–50)
PCP UR QL SCN: NEGATIVE
PH BLD: 7.25 PH (ref 7.35–7.45)
PH BLD: 7.49 PH (ref 7.35–7.45)
PH BLDV: 7.29 PH (ref 7.32–7.43)
PH BLDV: NORMAL PH (ref 7.32–7.43)
PH UR STRIP: 6.5 PH (ref 5–7)
PLATELET # BLD AUTO: 154 10E9/L (ref 150–450)
PLATELET # BLD AUTO: 163 10E9/L (ref 150–450)
PO2 BLD: 133 MM HG (ref 80–105)
PO2 BLD: 252 MM HG (ref 80–105)
PO2 BLDV: 62 MM HG (ref 25–47)
PO2 BLDV: NORMAL MM HG (ref 25–47)
POTASSIUM SERPL-SCNC: 4.2 MMOL/L (ref 3.4–5.3)
PROT SERPL-MCNC: 7.7 G/DL (ref 6.8–8.8)
RBC # BLD AUTO: 3.4 10E12/L (ref 3.8–5.2)
RBC # BLD AUTO: 3.75 10E12/L (ref 3.8–5.2)
RBC #/AREA URNS AUTO: ABNORMAL /HPF
SALICYLATES SERPL-MCNC: <2 MG/DL
SAO2 % BLDV FROM PO2: 88 %
SODIUM SERPL-SCNC: 137 MMOL/L (ref 133–144)
SOURCE: ABNORMAL
SP GR UR STRIP: 1.01 (ref 1–1.03)
TRANS CELLS #/AREA URNS HPF: ABNORMAL /HPF
TROPONIN I SERPL-MCNC: <0.015 UG/L (ref 0–0.04)
TSH SERPL DL<=0.005 MIU/L-ACNC: 0.77 MU/L (ref 0.4–4)
UROBILINOGEN UR STRIP-ACNC: 0.2 EU/DL (ref 0.2–1)
WBC # BLD AUTO: 6 10E9/L (ref 4–11)
WBC # BLD AUTO: 8.2 10E9/L (ref 4–11)
WBC #/AREA URNS AUTO: >100 /HPF
YEAST #/AREA URNS HPF: ABNORMAL /HPF

## 2017-11-13 PROCEDURE — 00000146 ZZHCL STATISTIC GLUCOSE BY METER IP

## 2017-11-13 PROCEDURE — 99223 1ST HOSP IP/OBS HIGH 75: CPT | Mod: AI | Performed by: HOSPITALIST

## 2017-11-13 PROCEDURE — 82803 BLOOD GASES ANY COMBINATION: CPT

## 2017-11-13 PROCEDURE — 36600 WITHDRAWAL OF ARTERIAL BLOOD: CPT

## 2017-11-13 PROCEDURE — 85025 COMPLETE CBC W/AUTO DIFF WBC: CPT | Performed by: EMERGENCY MEDICINE

## 2017-11-13 PROCEDURE — 84443 ASSAY THYROID STIM HORMONE: CPT | Performed by: EMERGENCY MEDICINE

## 2017-11-13 PROCEDURE — 25000128 H RX IP 250 OP 636

## 2017-11-13 PROCEDURE — 70450 CT HEAD/BRAIN W/O DYE: CPT

## 2017-11-13 PROCEDURE — 83605 ASSAY OF LACTIC ACID: CPT | Performed by: EMERGENCY MEDICINE

## 2017-11-13 PROCEDURE — 99292 CRITICAL CARE ADDL 30 MIN: CPT

## 2017-11-13 PROCEDURE — 80053 COMPREHEN METABOLIC PANEL: CPT | Performed by: EMERGENCY MEDICINE

## 2017-11-13 PROCEDURE — 93005 ELECTROCARDIOGRAM TRACING: CPT

## 2017-11-13 PROCEDURE — 87106 FUNGI IDENTIFICATION YEAST: CPT | Performed by: EMERGENCY MEDICINE

## 2017-11-13 PROCEDURE — 25000128 H RX IP 250 OP 636: Performed by: EMERGENCY MEDICINE

## 2017-11-13 PROCEDURE — 40000986 XR CHEST PORT 1 VW

## 2017-11-13 PROCEDURE — 80329 ANALGESICS NON-OPIOID 1 OR 2: CPT | Performed by: EMERGENCY MEDICINE

## 2017-11-13 PROCEDURE — 87040 BLOOD CULTURE FOR BACTERIA: CPT | Performed by: EMERGENCY MEDICINE

## 2017-11-13 PROCEDURE — 71010 XR CHEST PORT 1 VW: CPT

## 2017-11-13 PROCEDURE — 82805 BLOOD GASES W/O2 SATURATION: CPT | Performed by: EMERGENCY MEDICINE

## 2017-11-13 PROCEDURE — 40000275 ZZH STATISTIC RCP TIME EA 10 MIN

## 2017-11-13 PROCEDURE — 96361 HYDRATE IV INFUSION ADD-ON: CPT

## 2017-11-13 PROCEDURE — 25000125 ZZHC RX 250: Performed by: SURGERY

## 2017-11-13 PROCEDURE — 87086 URINE CULTURE/COLONY COUNT: CPT | Performed by: EMERGENCY MEDICINE

## 2017-11-13 PROCEDURE — 80050 GENERAL HEALTH PANEL: CPT | Performed by: INTERNAL MEDICINE

## 2017-11-13 PROCEDURE — 25000128 H RX IP 250 OP 636: Performed by: SURGERY

## 2017-11-13 PROCEDURE — 80048 BASIC METABOLIC PNL TOTAL CA: CPT | Performed by: INTERNAL MEDICINE

## 2017-11-13 PROCEDURE — 80307 DRUG TEST PRSMV CHEM ANLYZR: CPT | Performed by: EMERGENCY MEDICINE

## 2017-11-13 PROCEDURE — 96365 THER/PROPH/DIAG IV INF INIT: CPT

## 2017-11-13 PROCEDURE — 25000131 ZZH RX MED GY IP 250 OP 636 PS 637: Mod: GY | Performed by: SURGERY

## 2017-11-13 PROCEDURE — 5A1945Z RESPIRATORY VENTILATION, 24-96 CONSECUTIVE HOURS: ICD-10-PCS | Performed by: HOSPITALIST

## 2017-11-13 PROCEDURE — 80197 ASSAY OF TACROLIMUS: CPT | Performed by: INTERNAL MEDICINE

## 2017-11-13 PROCEDURE — 25000125 ZZHC RX 250: Performed by: EMERGENCY MEDICINE

## 2017-11-13 PROCEDURE — 94002 VENT MGMT INPAT INIT DAY: CPT

## 2017-11-13 PROCEDURE — 31500 INSERT EMERGENCY AIRWAY: CPT

## 2017-11-13 PROCEDURE — 36415 COLL VENOUS BLD VENIPUNCTURE: CPT | Performed by: INTERNAL MEDICINE

## 2017-11-13 PROCEDURE — 99291 CRITICAL CARE FIRST HOUR: CPT | Mod: 25 | Performed by: SURGERY

## 2017-11-13 PROCEDURE — 82805 BLOOD GASES W/O2 SATURATION: CPT | Performed by: SURGERY

## 2017-11-13 PROCEDURE — 99291 CRITICAL CARE FIRST HOUR: CPT | Mod: 25

## 2017-11-13 PROCEDURE — 99350 HOME/RES VST EST HIGH MDM 60: CPT | Performed by: NURSE PRACTITIONER

## 2017-11-13 PROCEDURE — A9270 NON-COVERED ITEM OR SERVICE: HCPCS | Mod: GY | Performed by: SURGERY

## 2017-11-13 PROCEDURE — 25000132 ZZH RX MED GY IP 250 OP 250 PS 637: Mod: GY | Performed by: SURGERY

## 2017-11-13 PROCEDURE — 81001 URINALYSIS AUTO W/SCOPE: CPT | Performed by: EMERGENCY MEDICINE

## 2017-11-13 PROCEDURE — 84484 ASSAY OF TROPONIN QUANT: CPT | Performed by: EMERGENCY MEDICINE

## 2017-11-13 PROCEDURE — S0028 INJECTION, FAMOTIDINE, 20 MG: HCPCS | Performed by: SURGERY

## 2017-11-13 PROCEDURE — 96375 TX/PRO/DX INJ NEW DRUG ADDON: CPT

## 2017-11-13 PROCEDURE — 20000003 ZZH R&B ICU

## 2017-11-13 RX ORDER — NALOXONE HYDROCHLORIDE 0.4 MG/ML
.1-.4 INJECTION, SOLUTION INTRAMUSCULAR; INTRAVENOUS; SUBCUTANEOUS
Status: DISCONTINUED | OUTPATIENT
Start: 2017-11-13 | End: 2017-11-19 | Stop reason: HOSPADM

## 2017-11-13 RX ORDER — HYDRALAZINE HYDROCHLORIDE 20 MG/ML
INJECTION INTRAMUSCULAR; INTRAVENOUS
Status: COMPLETED
Start: 2017-11-13 | End: 2017-11-13

## 2017-11-13 RX ORDER — FENTANYL CITRATE 50 UG/ML
INJECTION, SOLUTION INTRAMUSCULAR; INTRAVENOUS
Status: COMPLETED
Start: 2017-11-13 | End: 2017-11-13

## 2017-11-13 RX ORDER — CHLORHEXIDINE GLUCONATE ORAL RINSE 1.2 MG/ML
15 SOLUTION DENTAL EVERY 12 HOURS
Status: DISCONTINUED | OUTPATIENT
Start: 2017-11-13 | End: 2017-11-16

## 2017-11-13 RX ORDER — HYDRALAZINE HYDROCHLORIDE 20 MG/ML
10 INJECTION INTRAMUSCULAR; INTRAVENOUS ONCE
Status: COMPLETED | OUTPATIENT
Start: 2017-11-13 | End: 2017-11-13

## 2017-11-13 RX ORDER — MEROPENEM AND SODIUM CHLORIDE 500 MG/50ML
500 INJECTION, SOLUTION INTRAVENOUS ONCE
Status: COMPLETED | OUTPATIENT
Start: 2017-11-13 | End: 2017-11-13

## 2017-11-13 RX ORDER — PROPOFOL 10 MG/ML
INJECTION, EMULSION INTRAVENOUS
Status: COMPLETED
Start: 2017-11-13 | End: 2017-11-13

## 2017-11-13 RX ORDER — LABETALOL 100 MG/1
100 TABLET, FILM COATED ORAL 2 TIMES DAILY
Status: DISCONTINUED | OUTPATIENT
Start: 2017-11-13 | End: 2017-11-16

## 2017-11-13 RX ORDER — CEFTRIAXONE 2 G/1
2 INJECTION, POWDER, FOR SOLUTION INTRAMUSCULAR; INTRAVENOUS ONCE
Status: DISCONTINUED | OUTPATIENT
Start: 2017-11-13 | End: 2017-11-13

## 2017-11-13 RX ORDER — FENTANYL CITRATE 50 UG/ML
50 INJECTION, SOLUTION INTRAMUSCULAR; INTRAVENOUS ONCE
Status: COMPLETED | OUTPATIENT
Start: 2017-11-13 | End: 2017-11-13

## 2017-11-13 RX ORDER — DEXTROSE MONOHYDRATE 25 G/50ML
25-50 INJECTION, SOLUTION INTRAVENOUS
Status: DISCONTINUED | OUTPATIENT
Start: 2017-11-13 | End: 2017-11-16

## 2017-11-13 RX ORDER — NALOXONE HYDROCHLORIDE 0.4 MG/ML
.1-.4 INJECTION, SOLUTION INTRAMUSCULAR; INTRAVENOUS; SUBCUTANEOUS
Status: DISCONTINUED | OUTPATIENT
Start: 2017-11-13 | End: 2017-11-13

## 2017-11-13 RX ORDER — CINACALCET 30 MG/1
30 TABLET, FILM COATED ORAL DAILY
Status: DISCONTINUED | OUTPATIENT
Start: 2017-11-14 | End: 2017-11-15

## 2017-11-13 RX ORDER — MEROPENEM AND SODIUM CHLORIDE 500 MG/50ML
500 INJECTION, SOLUTION INTRAVENOUS EVERY 8 HOURS
Status: DISCONTINUED | OUTPATIENT
Start: 2017-11-14 | End: 2017-11-17

## 2017-11-13 RX ORDER — NICOTINE POLACRILEX 4 MG
15-30 LOZENGE BUCCAL
Status: DISCONTINUED | OUTPATIENT
Start: 2017-11-13 | End: 2017-11-16

## 2017-11-13 RX ORDER — ETOMIDATE 2 MG/ML
30 INJECTION INTRAVENOUS ONCE
Status: COMPLETED | OUTPATIENT
Start: 2017-11-13 | End: 2017-11-13

## 2017-11-13 RX ORDER — NALOXONE HYDROCHLORIDE 1 MG/ML
INJECTION INTRAMUSCULAR; INTRAVENOUS; SUBCUTANEOUS
Status: COMPLETED
Start: 2017-11-13 | End: 2017-11-13

## 2017-11-13 RX ORDER — PROPOFOL 10 MG/ML
5-75 INJECTION, EMULSION INTRAVENOUS CONTINUOUS
Status: DISCONTINUED | OUTPATIENT
Start: 2017-11-13 | End: 2017-11-16

## 2017-11-13 RX ORDER — NALOXONE HYDROCHLORIDE 1 MG/ML
1 INJECTION INTRAMUSCULAR; INTRAVENOUS; SUBCUTANEOUS ONCE
Status: COMPLETED | OUTPATIENT
Start: 2017-11-13 | End: 2017-11-13

## 2017-11-13 RX ORDER — MECLIZINE HCL 12.5 MG 12.5 MG/1
12.5 TABLET ORAL 3 TIMES DAILY PRN
Status: DISCONTINUED | OUTPATIENT
Start: 2017-11-13 | End: 2017-11-19 | Stop reason: HOSPADM

## 2017-11-13 RX ADMIN — SUCCINYLCHOLINE CHLORIDE 160 MG: 20 INJECTION, SOLUTION INTRAMUSCULAR; INTRAVENOUS at 19:36

## 2017-11-13 RX ADMIN — VANCOMYCIN HYDROCHLORIDE 2000 MG: 10 INJECTION, POWDER, LYOPHILIZED, FOR SOLUTION INTRAVENOUS at 18:45

## 2017-11-13 RX ADMIN — FENTANYL CITRATE 50 MCG: 50 INJECTION, SOLUTION INTRAMUSCULAR; INTRAVENOUS at 22:36

## 2017-11-13 RX ADMIN — SODIUM CHLORIDE 2.5 UNITS/HR: 9 INJECTION, SOLUTION INTRAVENOUS at 22:25

## 2017-11-13 RX ADMIN — CHLORHEXIDINE GLUCONATE 15 ML: 1.2 RINSE ORAL at 22:36

## 2017-11-13 RX ADMIN — ETOMIDATE 30 MG: 20 INJECTION, SOLUTION INTRAVENOUS at 19:38

## 2017-11-13 RX ADMIN — FENTANYL CITRATE 25 MCG/HR: 50 INJECTION, SOLUTION INTRAMUSCULAR; INTRAVENOUS at 22:58

## 2017-11-13 RX ADMIN — HYDRALAZINE HYDROCHLORIDE 10 MG: 20 INJECTION INTRAMUSCULAR; INTRAVENOUS at 21:11

## 2017-11-13 RX ADMIN — PROPOFOL 25 MCG/KG/MIN: 10 INJECTION, EMULSION INTRAVENOUS at 19:35

## 2017-11-13 RX ADMIN — LABETALOL HCL 100 MG: 100 TABLET, FILM COATED ORAL at 23:59

## 2017-11-13 RX ADMIN — FAMOTIDINE 20 MG: 10 INJECTION, SOLUTION INTRAVENOUS at 23:57

## 2017-11-13 RX ADMIN — NALOXONE HYDROCHLORIDE 1 MG: 1 INJECTION PARENTERAL at 16:22

## 2017-11-13 RX ADMIN — PROPOFOL 50 MCG/KG/MIN: 10 INJECTION, EMULSION INTRAVENOUS at 22:58

## 2017-11-13 RX ADMIN — SODIUM CHLORIDE 1000 ML: 9 INJECTION, SOLUTION INTRAVENOUS at 17:03

## 2017-11-13 RX ADMIN — MEROPENEM AND SODIUM CHLORIDE 500 MG: 500 INJECTION, SOLUTION INTRAVENOUS at 18:09

## 2017-11-13 RX ADMIN — NALOXONE HYDROCHLORIDE 1 MG: 1 INJECTION INTRAMUSCULAR; INTRAVENOUS; SUBCUTANEOUS at 16:22

## 2017-11-13 ASSESSMENT — ENCOUNTER SYMPTOMS
CONFUSION: 1
DYSPHORIC MOOD: 1

## 2017-11-13 NOTE — PROGRESS NOTES
SUBJECTIVE:   Gem Jade is a 68 year old female who is seen in her home due to inability to leave the home unassisted, morbid obesity, severe vertigo, generalized weakness, chronic pain, bedbound, rarely able to get up in the WC, due to vertigo,  seen today for the following health issues, seen with     ient has been unresponsive since last night, unable to eat or take meds, labored breathing, increased secretions, moans with sternal rub but does not open her eyes when I am there, unable to respond to questions or acknowledge my presence, 911 called        Hospital Follow-up Visit:    Hospital/Nursing Home/IP Rehab Facility: [facility]  Date of Admission: 11/6/17  Date of Discharge: 11/9/17  Reason(s) for Admission: Urosepsis, metabolic encephalopathy             Problems taking medications regularly:   has been unable to wake patient enough to take meds since last night        Medication changes since discharge: None       Problems adhering to non-medication therapy:  Unable to stay awake since being home to do anything other than take meds, condition worsened since last night and has not taken meds today at all or respond to questions, did not even wake up or respond to blood draw done earlier today  Summary of hospitalization:  [facility] discharge summary reviewed  Diagnostic Tests/Treatments reviewed.  Follow up needed: repeat labs, CBC  Other Healthcare Providers Involved in Patient s Care:         Homecare  Update since discharge: worsened. Patient has been unresponsive since last night, unable to eat or take meds, labored breathing, increased secretions, moans with sternal rub but does not open her eyes when I am there, unable to respond to questions or acknowledge my presence, 911 called    Post Discharge Medication Reconciliation: unable to reconcile discharge medications due to patient's status, unresponsive.  Plan of care communicated with family and  other healthcare provider     Coding guidelines for this visit:  Type of Medical   Decision Making Face-to-Face Visit       within 7 Days of discharge Face-to-Face Visit        within 14 days of discharge   Moderate Complexity 18319 [id number]   High Complexity 16316 15035                  Hospital Course by Problem:    # Acute toxic/metabolic encephalopathy  Altered on presentation.  CT head negative.  Improved with treatment of UTI below.       # Sepsis 2/2 rico associated UTI  History of recurrent UTI's due to chronic indwelling rico.  Febrile with leukocytosis on presentation with toxic/metabolic encephalopathy.  Repeat culture with > 100,000 citrobacter freundii.  Initially started on zoysn, but transitioned to ciprofloxacin for discharge once sensitivities results.  Rico was exchanged.  Consideration of long term suppressive antibiotics has been considered in the past, but was not pursued do to concerns for drug resistant c diff colitis.  Patient also has been unable to follow up with Urology due to mobility and cost of attending a clinic appointment.  Patient will follow up with home care provider.         Problem list and histories reviewed & adjusted, as indicated.  Additional history: as documented    Recent Labs   Lab Test  11/16/17   0525  11/15/17   0505   11/14/17   0455  11/13/17   1613   07/13/17   1105   03/09/17   1310   12/05/16   1210   02/05/15   1250   05/16/13   1240   A1C   --    --    --   7.7*   --    --   5.6   --    --    --   7.4*   < >  6.9*   < >  7.2*   LDL   --    --    --    --    --    --    --    --    --    --   64   --   18   --   149*   HDL   --    --    --    --    --    --    --    --    --    --   46*   --   43*   --   42*   TRIG   --    --    --    --    --    --    --    --    --    --   272*   --   223*   --   262*   ALT  11  11   --    --   11   < >   --    < >   --    < >   --    < >   --    < >   --    CR  1.79*  1.87*   < >  1.76*  2.06*   < >  1.23*   < >   --    < >    --    < >  0.67   < >  0.70   GFRESTIMATED  28*  27*   < >  28*  24*   < >  43*   < >   --    < >   --    < >  87   < >  84   GFRESTBLACK  34*  32*   < >  34*  29*   < >  52*   < >   --    < >   --    < >  >90   GFR Calc     < >  >90   POTASSIUM  3.3*  3.8   < >   --   4.2   < >  4.3   < >   --    < >   --    < >  4.3   < >  3.9   TSH   --    --    --    --   0.77   --    --    --   1.20   --    --    < >   --    < >   --     < > = values in this interval not displayed.      BP Readings from Last 3 Encounters:   11/15/17 142/54   11/09/17 163/56   11/01/17 120/60    Wt Readings from Last 3 Encounters:   11/16/17 224 lb 6.9 oz (101.8 kg)   11/08/17 228 lb 2.8 oz (103.5 kg)   04/27/17 199 lb 8 oz (90.5 kg)        Labs reviewed in EPIC        Reviewed and updated as needed this visit by clinical staff       Reviewed and updated as needed this visit by Provider         ROS: information comes from   General: Unresponsive to verbal questions, or sternal rub  : cath leaking since last night     OBJECTIVE:     /88 (BP Location: Left arm, Patient Position: Left side, Cuff Size: Adult Regular)  Pulse 62  SpO2 94% Random   There is no height or weight on file to calculate BMI.  GENERAL: severe distress, obese and unable to respond to questions, unable to open eyes, no response to sternal rub.    RESP: lungs clear to auscultation - no rales, rhonchi or wheezes, labored breathing, increased oral secretions, gagging occ like she is going to vomit but does not   CV: regular rate and rhythm, normal S1 S2, no S3 or S4, no murmur, click or rub, no peripheral edema  ABDOMEN: distended, hard, BS absent   (female): rico leaking, clear yellow urine in bag very small amount, chux under her saturated   PSYCH: unresponsive    Diagnostic Test Results:  Results for orders placed or performed in visit on 11/13/17   CBC with platelets   Result Value Ref Range    WBC 8.2 4.0 - 11.0 10e9/L    RBC Count  3.40 (L) 3.8 - 5.2 10e12/L    Hemoglobin 10.3 (L) 11.7 - 15.7 g/dL    Hematocrit 32.8 (L) 35.0 - 47.0 %    MCV 97 78 - 100 fl    MCH 30.3 26.5 - 33.0 pg    MCHC 31.4 (L) 31.5 - 36.5 g/dL    RDW 13.7 10.0 - 15.0 %    Platelet Count 163 150 - 450 10e9/L   Basic metabolic panel   Result Value Ref Range    Sodium 136 133 - 144 mmol/L    Potassium 4.3 3.4 - 5.3 mmol/L    Chloride 104 94 - 109 mmol/L    Carbon Dioxide 20 20 - 32 mmol/L    Anion Gap 12 3 - 14 mmol/L    Glucose 305 (H) 70 - 99 mg/dL    Urea Nitrogen 52 (H) 7 - 30 mg/dL    Creatinine 2.02 (H) 0.52 - 1.04 mg/dL    GFR Estimate 24 (L) >60 mL/min/1.7m2    GFR Estimate If Black 29 (L) >60 mL/min/1.7m2    Calcium 7.5 (L) 8.5 - 10.1 mg/dL   Tacrolimus level   Result Value Ref Range    Tacrolimus Last Dose Not Provided     Tacrolimus Level 8.3 5.0 - 15.0 ug/L     *Note: Due to a large number of results and/or encounters for the requested time period, some results have not been displayed. A complete set of results can be found in Results Review.       ASSESSMENT/PLAN:     (Z09) Hospital discharge follow-up  (primary encounter diagnosis)  Comment: patient condition significantly worsened since being home,  unresponsive since last night, unable to take meds this am, not responding to verbal questions or sternal rub, labored breathing, elevated RBG suspect sepsis  Plan: 911 called and patient transported to [facility] ED, Novolog 7U administered before transport       Marivel Barcenas, ALISON, APRN CNP  [facility]    Visit time 60   min with > than 50% of the time spent in counseling on: hospital discharge follow up, 911 called

## 2017-11-13 NOTE — ED PROVIDER NOTES
History     Chief Complaint:  Respiratory Distress, Altered mental status      HPI   Of note, this patient's history is limited secondary to her presenting condition. Information provided here was given by EMS.    Gem Jade is a 71 year old female with a history of sepsis, altered mental status, PE, HTN, hyperlipidemia, diabetes, and UTI who presents to the emergency department via EMS for evaluation of altered mental status and respiratory distress. Of note, the patient is completely bed ridden and has a rico cathter in place. The patient was seen at week ago at the [facility] for sepsis with a similar presentations as today. She also a history of UTI resulting in metabolic encephalopathy. Since this morning, the patient has been unresponsive and note she has not had oral intact since yesterday. This prompted the patient's  to call EMS. In route, patient was being bagged and had blood pressure readings of 120/70 and one that was 60/40, blood sugar of 369, and an axial temp of 95.5. EMS only found the patient to react very minimally to a very deep sternal rub. On arrival, the patient is on O2 but unresponsive.     Allergies:  Bactrim   Atorvastatin Calcium   Codeine   Darvocet  Hydromorphone   Levofloxacin   Morphine   Niaspan  Percocet   Vicodin      Medications:    diphenoxylate-atropine   meclizine   ciprofloxacin   gabapentin   tacrolimus   clonidine   prednisone  insulin glargine  insulin aspart   lidocaine   labetalol   loratadine   pravastatin   Oxcarbazepine   aspirin   cinacalcet   cyanocobalamin   Tramadol HC  levothyroxine   glucagon   ondansetron  simethicone   Lactobacillus Rhamnosus  famotidine    Past Medical History:    Background diabetic retinopathy   Diabetic gastroparesis  Diarrhea   Dizziness and giddiness   Hyponatremia   Kidney replaced by transplant   Mixed hyperlipidemia   Obesity   Osteopenia   Other and unspecified hyperlipidemia   Other pulmonary embolism and  infarction  Polyneuropathy in diabetes  Primary localized osteoarthrosis, lower leg   Pyelonephritis, unspecified   Type 2 diabetes mellitus with complications   Urinary tract infection, site not specified   UTI   Sepsis  Hyperkalemia  C. diff  Morbid obesity   pancytopenia  Enterococcus faecalis infection    Past Surgical History:    Kidney transplant  Knee replacement  Heart catheterization  Bilateral cataract repair    Family History:    diabetes mellitus  HTN  Heart Disease    Social History:  Former smoker: quit date 6/1/1987  Negative for alcohol use.  Marital Status:        Review of Systems   Psychiatric/Behavioral: Positive for confusion and dysphoric mood.   All other systems reviewed and are negative.    Physical Exam   First Vitals:  BP: (!) 203/88  Heart Rate: 60  Temp: 94.5  F (34.7  C) (Simultaneous filing. User may not have seen previous data.)  Resp: 22  Weight: 103 kg (227 lb)  SpO2: 100 %      Physical Exam  General: Pt seen on [facility], unconscious, responds to sternal rub with moan only.  Did nod head once to questioning but not speaking.   Eyes: Clear sclera  ENT: MMM, neck supple with no TTP.  + secretions tolerated, + gag noted  CV: no JVD noted, HR 60  Respiratory:   + bradypnea to 10, Snoring respirations,  no accessory m use, speaking in full sentences.  Abd: Soft, non tender, no guarding, no rebound. Non distended  Skin: No skin changes, no edema or rash present to extremities  Neuro: GCS 7 (142)  Psych: Not RIS, no e/o AH/VH    Emergency Department Course   ECG:  Indication: Respiratory distress  Time: 1614  Vent. Rate 60 bpm. NJ interval 188. QRS duration 56. QT/QTc 474/474. P-R-T axis 78 33 65. Normal sinus rhythm. Normal ECG. Read time: 1615.    Imaging:  Radiographic findings were communicated with the patient who voiced understanding of the findings.    CT Head without contrast:   1. No acute abnormality.  2. Atrophy of the brain. White matter changes consistent with  "sequelae  of small vessel ischemic disease. This is unchanged. As per radiology.    XR Chest 2 views:   Portable view of the chest is performed in the supine  position. Lungs are clear but hypoaerated. Heart is enlarged but  unchanged. Aorta is calcified and tortuous. No obvious pneumothorax on  this supine view. No definite pleural effusions. As per radiology.     Laboratory:  CBC: WBC: 6.0, HGB: 11.3 (L), PLT: 154  CMP: Glucose 294 (H), Bun 47 (H), Creatine 2.06 (H), GFR 24 (L), Calcium 7.4 (L), Albumin 3.0 (L), o/w WNL  Lactic acid whole blood: 0.8  1606 Glucose by meter: 293 (H)  1824 Glucose by meter: 260 (H)  VBG: pH 7.29 (L) / PCO2 54 (H) / PO2 62 (H) / Bicarb 26  Blood gas arterial and oxyhgb: pH 7.25 (L) / pCO2 55 (H) / pO2 133 (H) / Bicarbonate 24    UA with micro: blood moderation, glc 500, protein albumin 100, Leukocyte Esterase small, WBC >100, RBC 25-50, Transitional epi moderate, bacteria many, yeast many o/w negative  Urine culture Aerobic bacterial: In process  1613 Troponin: <0.015  TSH with free T4 reflex: 0.77  Salicylate level: <2  Acetaminophen level: <2  Drug abuse screen 77 urine: All negative   Urine culture: In process  Blood culture: No growth after 1 hour    Procedure:  Intubation Procedure Note:   Date/Time: 7:23 PM  Performed by: Armen Gregg    The procedure was performed in an emergent situation.  Risks and benefits: risks, benefits and alternatives were discussed.  Patient identity confirmed: verbally with patient and arm band  Time out: Immediately prior to procedure a \"time out\" was called to verify the correct patient, procedure, equipment, support staff and site/side marked as required.    Indication: Acute respiratory failure. and Airway protection.    Intubation method: Direct  Patient status: RSI  Preoxygenation: Mask  Pretreatment medications: None  Sedatives: Etomidate  Paralytic: succinylcholine  Laryngoscope size: Mac 4  Tube size: 7.0 cuffed with cuff inflated after " placement  Number of attempts: 1  Cricoid pressure: yes  Cords visualized: yes  Post-procedure assessment: Breath sounds equal bilaterally with chest rise and absent over the epigastrium, Chest x-ray interpreted by me demonstrating endotracheal tube in appropriate position and CO2 detector.  ETT to teeth: 26 cm  Tube secured with: ETT smith    Patient tolerated the procedure well with no immediate complications.  Complications:  None    Interventions:  1622 Narcan 1 mg IV  1703 NS 1L IV  1809 Meropenem 1 g in NS IV  1845 Vancomycin 2000 mg in NaCl 0.9% IV  1809 Meropenem 1 g in NS IV  1935 Propofol 25mcg/kg/min IV  1936 Anectine 160 mg IV  1938 Amidate 30 mg IV  Medications   vancomycin (VANCOCIN) 2,000 mg in NaCl 0.9 % 500 mL intermittent infusion (2,000 mg Intravenous New Bag 11/13/17 1845)   propofol (DIPRIVAN) infusion (25 mcg/kg/min × 103 kg Intravenous New Bag 11/13/17 1935)   naloxone (NARCAN) injection 1 mg (1 mg Intravenous Given 11/13/17 1622)   0.9% sodium chloride BOLUS (0 mLs Intravenous Stopped 11/13/17 1752)   meropenem (MERREM) intermittent 500 mg in NS PREMIX (500 mg Intravenous Given 11/13/17 1809)   etomidate (AMIDATE) injection 30 mg (30 mg Intravenous Given 11/13/17 1938)   succinylcholine (ANECTINE) injection 160 mg (160 mg Intravenous Given 11/13/17 1936)       Emergency Department Course:  1559 Patients arrives via EMS on oxygen  1400 Patient places on oxygen bag  1401 Patient states name, otherwise unresponsive     IV inserted. Medicine administered as documented above. Blood drawn. This was sent to the lab for further testing, results above.    EKG obtained in the ED, see results above.     The patient was sent for a chest xray and head CT while in the emergency department, findings above.     1750  I consulted with Dr. Marte of the hospitalist services about accepting the patient for care.    I spoke with the patient's  vie telephone call about the patient's presentation here in  the emergency department.     1755 Dr. Marte evaluated the patient here in the emergency department, after which he spoke with me. He recommended intubating the patient.     I spoke with patient's  again regarding consent for intubation.    1800 I spoke with Dr. Velasquez of the hospitalist services who agrees to take the patient into care.     1923 The patient was intubated here in the ED.     Dr. Velasquez will admit the patient to a ICU bed for further monitoring, evaluation, and treatment.  Impression & Plan    Medical Decision Making:  Gem Jade is a 71 year old female who presents with altered mental status. Unclear cause at this point. There does appear to be some element of metabolic encephalopathy, which may consistent with her previous episodes per my review of EHR and per my discussion with patient's . Unclear cause of her metabolic derangement, however, this patient is not tachycardic, afebrile (I do appreciate an initial low grade hypothermia), normal white count, normal lactate, soft abdomen. Patient's GCS hovering between 5-6 but was protecting her airway, able to clear her secretions and had an overall reassured. She was not found to be in respiratory distress throughout the day,  But at the request of the inpatient team, she was intubated for ease of transport and possible MRI. I discussed this with patient's  and he was in agreement with this plan. Patient tolerated the procedure very well, see above procedure note for formal details, no complications were noted. Patient now has a bed in ICU. My differential for patient's altered mental state is wide and I have considered infection and the patient is being treated empirically for this given her ESPL she has receiving meropenem. Also will note that the CT of head is negative and have normal blood glucose. Patient may have some element of neurological derangement and does warrant an MRI at this point. Also given the patient's  frequent UTI, this may be a metabolic encephalopathy from a recurrent infection as manifest by her hyperthermia and for this she has received appropriate antibiotics. I spoke with  and he is up to date on the plan of care. I also spoke with Dr. Velasquez of the ICU who has generously accepted care of the patient. I previously had planned on admitted the patient to the ICU under the care of Dr. Marte, but then as a team we agreed the patient should be intubated, at this point it become Dr. Velasquez's patient. We will monitor very carefully until ICU bed available.      Critical care time 40 minutes excluding procedures.       Diagnosis:    ICD-10-CM    1. Altered mental status, unspecified altered mental status type R41.82    2. Bradypnea R06.89        Disposition:  Admitted to Dr. Velasquez    I, Cornelia Diamond, am serving as a scribe on 11/13/2017 at 5:57 PM to personally document services performed by Armen Gregg MD based on my observations and the provider's statements to me.       Cornelia Diamond  11/13/2017    EMERGENCY DEPARTMENT       Armen Gregg MD  11/13/17 8117

## 2017-11-13 NOTE — IP AVS SNAPSHOT
[address], [address]    [facility] [address]    Phone:  [phone]                                       After Visit Summary   11/13/2017    Gem Jade    MRN: [id number]           After Visit Summary Signature Page     I have received my discharge instructions, and my questions have been answered. I have discussed any challenges I see with this plan with the nurse or doctor.    ..........................................................................................................................................  Patient/Patient Representative Signature      ..........................................................................................................................................  Patient Representative Print Name and Relationship to Patient    ..................................................               ................................................  Date                                            Time    ..........................................................................................................................................  Reviewed by Signature/Title    ...................................................              ..............................................  Date                                                            Time

## 2017-11-13 NOTE — ED NOTES
Bed: ST01  Expected date:   Expected time:   Means of arrival:   Comments:  Jaimee 518 Resp arrest 71 female

## 2017-11-13 NOTE — MR AVS SNAPSHOT
After Visit Summary   11/13/2017    Gem Jade    MRN: [id number]           Patient Information     Date Of Birth          1945        Visit Information        Provider Department      11/13/2017 2:00 PM Marivel Barcenas APRN CNP [facility]        Today's Diagnoses     Hospital discharge follow-up    -  1       Follow-ups after your visit        Your next 10 appointments already scheduled     Dec 06, 2017  2:00 PM CST   Return Visit with ANTHONY Antonio CNP   [facility] ([facility])    [address] [place] [address]  [address] [place] [id number] [phone]            Dec 06, 2017  2:00 PM CST   Office Visit with Conchita Toledo [facility] ([facility])    [address] [place] [id number] [phone]           Bring a current list of meds and any records pertaining to this visit. For Physicals, please bring immunization records and any forms needing to be filled out. Please arrive 10 minutes early to complete paperwork.              Who to contact     If you have questions or need follow up information about today's clinic visit or your schedule please contact [facility] directly at [phone].  Normal or non-critical lab and imaging results will be communicated to you by MyChart, letter or phone within 4 business days after the clinic has received the results. If you do not hear from us within 7 days, please contact the clinic through MyChart or phone. If you have a critical or abnormal lab result, we will notify you by phone as soon as possible.  Submit refill requests through [employer] or call your pharmacy and they will forward the refill request to us. Please allow 3 business days for your refill to be completed.          Additional Information About Your Visit        MyChart Information      [email or web address] gives you secure access to your electronic health record. If you see a primary care provider, you can also send messages to your care team and make appointments. If you have questions, please call your primary care clinic.  If you do not have a primary care provider, please call [phone] and they will assist you.        Care EveryWhere ID     This is your Care EveryWhere ID. This could be used by other organizations to access your [place] medical records  [id number]        Your Vitals Were     Pulse Pulse Oximetry                62 94%           Blood Pressure from Last 3 Encounters:   11/16/17 148/60   11/16/17 140/88   11/09/17 163/56    Weight from Last 3 Encounters:   11/16/17 224 lb 6.9 oz (101.8 kg)   11/08/17 228 lb 2.8 oz (103.5 kg)   04/27/17 199 lb 8 oz (90.5 kg)              Today, you had the following     No orders found for display         Today's Medication Changes          These changes are accurate as of: 11/13/17  4:04 PM.  If you have any questions, ask your nurse or doctor.               These medicines have changed or have updated prescriptions.        Dose/Directions    TraMADol HCl 50 MG Tbdp   Indication:  Moderate to Moderately Severe Pain   This may have changed:  when to take this   Used for:  Chronic shoulder pain, unspecified laterality        Dose:  50 mg   Take 50 mg by mouth 3 times daily as needed   Quantity:  90 tablet   Refills:  3                Primary Care Provider Office Phone # Fax #    Marivel Naomi Barcenas, APRN [facility] [phone]       [address] [address]        Equal Access to Services     CARLOS ENRIQUE GREEN AH: Hadjb carranza Sokaleigh, waaxda luqadaha, qaybta kaalmada jessica jimenez. So [facility] [phone].    ATENCIÓN: Si habla español, tiene a peguero disposición servicios gratuitos de asistencia lingüística. Llame al [phone].    We comply with applicable federal civil rights  laws and [place] laws. We do not discriminate on the basis of race, color, national origin, age, disability, sex, sexual orientation, or gender identity.            Thank you!     Thank you for choosing [facility]  for your care. Our goal is always to provide you with excellent care. Hearing back from our patients is one way we can continue to improve our services. Please take a few minutes to complete the written survey that you may receive in the mail after your visit with us. Thank you!             Your Updated Medication List - Protect others around you: Learn how to safely use, store and throw away your medicines at www.disposemymeds.org.          This list is accurate as of: 11/13/17  4:04 PM.  Always use your most recent med list.                   Brand Name Dispense Instructions for use Diagnosis    ACCU-CHEK COMPACT PLUS test strip   Generic drug:  blood glucose monitoring     100 strip    Use to test blood sugar 3 times daily or as directed.  Ok to substitute alternative if insurance prefers.        ACETAMINOPHEN PO      Take 325-650 mg by mouth every 4 hours as needed.        aspirin 81 MG chewable tablet     90 tablet    Take 1 tablet (81 mg) by mouth daily Starting 1 week after your kidney biopsy    Pulmonary embolism and infarction (H), Diabetes mellitus with background retinopathy (H)       cinacalcet 30 MG tablet    SENSIPAR    60 tablet    Take 1 tablet (30 mg) by mouth daily    Kidney replaced by transplant, Hypercalcemia       ciprofloxacin 500 MG tablet    CIPRO    14 tablet    Take 1 tablet (500 mg) by mouth every 12 hours for 7 days    Acute cystitis without hematuria       cloNIDine 0.1 MG tablet    CATAPRES    180 tablet    TAKE ONE TABLET BY MOUTH TWICE A DAY    HTN, goal below 140/90       Cranberry 400 MG Tabs      Take 400 mg by mouth 2 times daily        cyanocobalamin 1000 MCG/ML injection    VITAMIN B12    3 mL    Inject 1 mL (1,000 mcg) into the muscle every 30  days    Iron deficiency anemia, unspecified iron deficiency anemia type       diphenoxylate-atropine 2.5-0.025 MG per tablet    LOMOTIL    30 tablet    Take 1 tablet by mouth 4 times daily as needed for diarrhea    Diarrhea of presumed infectious origin       famotidine 20 MG tablet    PEPCID    60 tablet    Take 1 tablet (20 mg) by mouth 2 times daily        gabapentin 300 MG capsule    NEURONTIN    180 capsule    Take 3 capsules (900 mg) by mouth 2 times daily For pain    Diabetic polyneuropathy associated with type 2 diabetes mellitus (H)       insulin aspart 100 UNIT/ML injection    NovoLOG FLEXPEN    15 mL    Inject 10 units under the skin 2 times daily before meals as directed. Add sliding scale as needed. Avg use 25 units per day    Type 2 diabetes mellitus with diabetic polyneuropathy, with long-term current use of insulin (H)       insulin glargine 100 UNIT/ML injection    LANTUS SOLOSTAR    15 mL    Inject 24 units under the skin daily.    Type 2 diabetes mellitus with diabetic polyneuropathy, with long-term current use of insulin (H)       insulin pen needle 30G X 8 MM     100 each    Use as directed with Lantus    Type 2 diabetes mellitus with diabetic nephropathy (H)       labetalol 300 MG tablet    NORMODYNE    120 tablet    Take 150 mg by mouth 2 times daily Take 1/2 tablet (150mg) =300mg. Hold for systolic BP less than 120.    Benign essential hypertension, Kidney replaced by transplant       lactobacillus rhamnosus (GG) capsule     60 capsule    Take 1 capsule by mouth 2 times daily    Diarrhea       levothyroxine 50 MCG tablet    SYNTHROID    90 tablet    Take 1 tablet (50 mcg) by mouth daily    Other specified hypothyroidism       lidocaine 2 % topical gel    XYLOCAINE    30 mL    Apply topically as needed for moderate pain 10 ML every 3 weeks    Johnson catheter in place       loratadine 10 MG tablet    CLARITIN    90 tablet    Take 1 tablet (10 mg) by mouth daily    Acute nasopharyngitis        meclizine 12.5 MG tablet    ANTIVERT    30 tablet    Take 1 tablet (12.5 mg) by mouth 3 times daily as needed for dizziness    Disorientation       ondansetron 4 MG ODT tab    ZOFRAN ODT    20 tablet    Take 1-2 tablets (4-8 mg) by mouth every 8 hours as needed for nausea    Nausea       order for DME     3 catheter    Equipment being ordered: Johnson Catheters - 18 french    Johnson catheter status       * order for DME     100 pad    Equipment being ordered: Blue Chux Please send to [facility]    Fecal incontinence       * order for DME     1 each    Equipment being ordered: Ahuja FX CPAP interface (nasal pillows), size XS.    Obstructive sleep apnea syndrome       * order for DME     12 each    Equipment being ordered: Silver Impregnated catheters HX of frequent UTI    Recurrent UTI       * order for DME     1 Device    High back guerrero wheel chair MICHELLE 99 DX: morbid obesity, generalized weakness, physical deconditioning, chronic vertigo    Morbid obesity, unspecified obesity type (H), Vertigo, Generalized muscle weakness, Physical deconditioning       pravastatin 10 MG tablet    PRAVACHOL    90 tablet    TAKE ONE TABLET BY MOUTH EVERY DAY    Hyperlipidemia LDL goal <100       predniSONE 5 MG tablet    DELTASONE    90 tablet    Take 1 tablet (5 mg) by mouth daily    Kidney replaced by transplant       simethicone 125 MG Chew chewable tablet    MYLICON    120 tablet    Take 1 tablet (125 mg) by mouth as needed for intestinal gas        * tacrolimus 1 MG capsule    GENERIC EQUIVALENT    180 capsule    Take 3 capsules (3 mg) by mouth 2 times daily (total dose 3.5 mg twice daily)    Kidney transplant recipient, Long-term use of immunosuppressant medication       * tacrolimus 0.5 MG capsule    GENERIC EQUIVALENT    60 capsule    Take 1 capsule (0.5 mg) by mouth 2 times daily (total dose 3.5 mg twice daily)    Kidney transplant recipient       TraMADol HCl 50 MG Tbdp     90 tablet    Take 50 mg by mouth 3 times daily as  needed    Chronic shoulder pain, unspecified laterality       TRILEPTAL 300 MG tablet   Generic drug:  OXcarbazepine     180 tablet    Take 1 tablet (300 mg) by mouth 2 times daily For pain    Diabetic polyneuropathy associated with diabetes mellitus due to underlying condition (H)       vitamin D 2000 UNITS tablet     100 tablet    Take 4,000 Units by mouth 2 times daily    Vitamin deficiency       * Notice:  This list has 6 medication(s) that are the same as other medications prescribed for you. Read the directions carefully, and ask your doctor or other care provider to review them with you.

## 2017-11-13 NOTE — TELEPHONE ENCOUNTER
Spoke with SEKOU [facility] and gave verbal ok on orders requested per RN protocol.    Wendie Harris RN

## 2017-11-13 NOTE — IP AVS SNAPSHOT
MRN:[id number]                      After Visit Summary   11/13/2017    Gem Jade    MRN: [id number]           Thank you!     Thank you for choosing [facility] for your care. Our goal is always to provide you with excellent care. Hearing back from our patients is one way we can continue to improve our services. Please take a few minutes to complete the written survey that you may receive in the mail after you visit with us. Thank you!        Patient Information     Date Of Birth          1945        Designated Caregiver       Most Recent Value    Caregiver    Will someone help with your care after discharge? yes    Name of designated caregiver jamie    Phone number of caregiver in chart    Caregiver address in chart      About your hospital stay     You were admitted on:  November 13, 2017 You last received care in the:  [place] [address] [facility]    You were discharged on:  November 19, 2017       Who to Call     For medical emergencies, please call 911.  For non-urgent questions about your medical care, please call your primary care provider or clinic, [phone]          Attending Provider     Provider Specialty    Armen Gregg MD Emergency Medicine    [facility], Santiago Lemus MD Critical Care Medicine    Velasquez, Robin Eckert MD General Surgery       Primary Care Provider Office Phone # Fax #    ANTHONY Antonio [facility] [phone]      After Care Instructions     Activity       Your activity upon discharge: activity as tolerated            Diet       Follow this diet upon discharge:  Moderate carb controlled diet            Patient care order       Please resume home care on discharge                  Follow-up Appointments     Follow-up and recommended labs and tests        Follow up with primary care provider, Marivel Barcenas NP, within 7 days for hospital follow- up.  Recheck BMP in 1week  Take tacrolimus 2mg  tonight and call trannsplant office tomorrow for further dosing                  Your next 10 appointments already scheduled     Dec 06, 2017  2:00 PM CST   [facility] AN[facility]Y Antonio CNP   [facility]    [address] [facility] [address]  [address]  [facility] [address] [phone]            Dec 06, 2017  2:00 PM CST   Office Visit with Conchita Toledo RPH   [facility] ([facility])    [address] [place] [address]  [address]  [facility] [address] [phone]           Bring a current list of meds and any records pertaining to this visit. For Physicals, please bring immunization records and any forms needing to be filled out. Please arrive 10 minutes early to complete paperwork.              Additional Services     Home care nursing referral       RN skilled nursing visit. RN to assess vital signs and weight, respiratory and cardiac status, pain level and activity tolerance and hydration, nutrition and bowel status .  Resumption of Homecare RN/HHA services.  Your provider has ordered home care nursing services. If you have not been contacted within 2 days of your discharge please call the inpatient department phone number at [phone]                  Pending Results     Date and Time Order Name Status Description    11/19/2017 0836 EKG 12-lead, tracing only Preliminary     11/19/2017 0200 Tacrolimus level In process     11/17/2017 1418 EKG 12-lead, tracing only Preliminary             Statement of Approval     Ordered          11/19/17 0848  I have reviewed and agree with all the recommendations and orders detailed in this document.  EFFECTIVE NOW     Approved and electronically signed by:  Jakni Marin MD             Admission Information     Date & Time Provider Department Dept. Phone    11/13/2017 Robin Velasquez MD [place] [address] [facility] [phone]      Your  "Vitals Were     Blood Pressure Pulse Temperature Respirations Height Weight    127/48 (BP Location: Left arm) 76 97.4  F (36.3  C) (Oral) 16 1.676 m (5' 5.98\") 101.7 kg (224 lb 1.6 oz)    Pulse Oximetry BMI (Body Mass Index)                99% 36.19 kg/m2          [employer] Information     [employer] gives you secure access to your electronic health record. If you see a primary care provider, you can also send messages to your care team and make appointments. If you have questions, please call your primary care clinic.  If you do not have a primary care provider, please call [phone] and they will assist you.        Care EveryWhere ID     This is your Care EveryWhere ID. This could be used by other organizations to access your [place] medical records  [id number]        Equal Access to Services     CARLOS ENRIQUE GREEN : Marques Goodman, madhav dai, jessica paz. So [facility] [phone].    ATENCIÓN: Si habla español, tiene a peguero disposición servicios gratuitos de asistencia lingüística. Carmine al [phone].    We comply with applicable federal civil rights laws and [place] laws. We do not discriminate on the basis of race, color, national origin, age, disability, sex, sexual orientation, or gender identity.               Review of your medicines      CONTINUE these medicines which may have CHANGED, or have new prescriptions. If we are uncertain of the size of tablets/capsules you have at home, strength may be listed as something that might have changed.        Dose / Directions    * diphenoxylate-atropine 2.5-0.025 MG per tablet   Commonly known as:  LOMOTIL   This may have changed:  Another medication with the same name was added. Make sure you understand how and when to take each.   Used for:  Diarrhea of presumed infectious origin        Dose:  1 tablet   Take 1 tablet by mouth 4 times daily as needed for diarrhea   Quantity:  30 tablet "   Refills:  0       * diphenoxylate-atropine 2.5-0.025 MG per tablet   Commonly known as:  LOMOTIL   This may have changed:  You were already taking a medication with the same name, and this prescription was added. Make sure you understand how and when to take each.   Used for:  Diarrhea, unspecified type        Dose:  2 tablet   Take 2 tablets by mouth 4 times daily as needed for diarrhea   Quantity:  30 tablet   Refills:  0       insulin aspart 100 UNIT/ML injection   Commonly known as:  NovoLOG FLEXPEN   This may have changed:  additional instructions   Used for:  Type 2 diabetes mellitus with diabetic polyneuropathy, with long-term current use of insulin (H)        Inject 10 units under the skin 2 times daily before meals as directed. Add sliding scale as needed. Avg use 25 units per day   Quantity:  15 mL   Refills:  11       tacrolimus 1 MG capsule   Commonly known as:  GENERIC EQUIVALENT   This may have changed:    - how much to take  - when to take this  - Another medication with the same name was removed. Continue taking this medication, and follow the directions you see here.   Used for:  Kidney transplant recipient, Long-term use of immunosuppressant medication        Dose:  2 mg   Take 2 capsules (2 mg) by mouth every evening (total dose 3.5 mg twice daily)   Quantity:  180 capsule   Refills:  3       * Notice:  This list has 2 medication(s) that are the same as other medications prescribed for you. Read the directions carefully, and ask your doctor or other care provider to review them with you.      CONTINUE these medicines which have NOT CHANGED        Dose / Directions    ACCU-CHEK COMPACT PLUS test strip   Generic drug:  blood glucose monitoring        Use to test blood sugar 3 times daily or as directed.  Ok to substitute alternative if insurance prefers.   Quantity:  100 strip   Refills:  prn       ACETAMINOPHEN PO        Dose:  325-650 mg   Take 325-650 mg by mouth every 4 hours as needed.    Refills:  0       aspirin 81 MG chewable tablet   Used for:  Pulmonary embolism and infarction (H), Diabetes mellitus with background retinopathy (H)        Dose:  81 mg   Take 1 tablet (81 mg) by mouth daily Starting 1 week after your kidney biopsy   Quantity:  90 tablet   Refills:  3       cinacalcet 30 MG tablet   Commonly known as:  SENSIPAR   Used for:  Kidney replaced by transplant, Hypercalcemia        Dose:  30 mg   Take 1 tablet (30 mg) by mouth daily   Quantity:  60 tablet   Refills:  3       cloNIDine 0.1 MG tablet   Commonly known as:  CATAPRES   Used for:  HTN, goal below 140/90        TAKE ONE TABLET BY MOUTH TWICE A DAY   Quantity:  180 tablet   Refills:  3       Cranberry 400 MG Tabs        Dose:  400 mg   Take 400 mg by mouth 2 times daily   Refills:  0       cyanocobalamin 1000 MCG/ML injection   Commonly known as:  VITAMIN B12   Used for:  Iron deficiency anemia, unspecified iron deficiency anemia type        Dose:  1000 mcg   Inject 1 mL (1,000 mcg) into the muscle every 30 days   Quantity:  3 mL   Refills:  3       famotidine 20 MG tablet   Commonly known as:  PEPCID        Dose:  20 mg   Take 1 tablet (20 mg) by mouth 2 times daily   Quantity:  60 tablet   Refills:  1       gabapentin 300 MG capsule   Commonly known as:  NEURONTIN   Used for:  Diabetic polyneuropathy associated with type 2 diabetes mellitus (H)        Dose:  900 mg   Take 3 capsules (900 mg) by mouth 2 times daily For pain   Quantity:  180 capsule   Refills:  3       insulin glargine 100 UNIT/ML injection   Commonly known as:  LANTUS SOLOSTAR   Used for:  Type 2 diabetes mellitus with diabetic polyneuropathy, with long-term current use of insulin (H)        Inject 24 units under the skin daily.   Quantity:  15 mL   Refills:  11       insulin pen needle 30G X 8 MM   Used for:  Type 2 diabetes mellitus with diabetic nephropathy (H)        Use as directed with Lantus   Quantity:  100 each   Refills:  3       labetalol 300 MG  tablet   Commonly known as:  NORMODYNE   Used for:  Benign essential hypertension, Kidney replaced by transplant        Dose:  150 mg   Take 150 mg by mouth 2 times daily Take 1/2 tablet (150mg) =300mg. Hold for systolic BP less than 120.   Quantity:  120 tablet   Refills:  3       lactobacillus rhamnosus (GG) capsule   Used for:  Diarrhea        Dose:  1 capsule   Take 1 capsule by mouth 2 times daily   Quantity:  60 capsule   Refills:  11       levothyroxine 50 MCG tablet   Commonly known as:  SYNTHROID   Used for:  Other specified hypothyroidism        Dose:  50 mcg   Take 1 tablet (50 mcg) by mouth daily   Quantity:  90 tablet   Refills:  3       lidocaine 2 % topical gel   Commonly known as:  XYLOCAINE   Used for:  Johnson catheter in place        Apply topically as needed for moderate pain 10 ML every 3 weeks   Quantity:  30 mL   Refills:  11       loratadine 10 MG tablet   Commonly known as:  CLARITIN   Used for:  Acute nasopharyngitis        Dose:  10 mg   Take 1 tablet (10 mg) by mouth daily   Quantity:  90 tablet   Refills:  3       meclizine 12.5 MG tablet   Commonly known as:  ANTIVERT   Used for:  Disorientation        Dose:  12.5 mg   Take 1 tablet (12.5 mg) by mouth 3 times daily as needed for dizziness   Quantity:  30 tablet   Refills:  0       ondansetron 4 MG ODT tab   Commonly known as:  ZOFRAN ODT   Used for:  Nausea        Dose:  4-8 mg   Take 1-2 tablets (4-8 mg) by mouth every 8 hours as needed for nausea   Quantity:  20 tablet   Refills:  5       order for DME   Used for:  Johnson catheter status        Equipment being ordered: Johnson Catheters - 18 french   Quantity:  3 catheter   Refills:  PRN       * order for DME   Used for:  Fecal incontinence        Equipment being ordered: Blue Chux Please send to [facility]   Quantity:  100 pad   Refills:  3       * order for DME   Used for:  Obstructive sleep apnea syndrome        Equipment being ordered: Ahuja FX CPAP interface (nasal pillows), size  XS.   Quantity:  1 each   Refills:  1       * order for DME   Used for:  Recurrent UTI        Equipment being ordered: Silver Impregnated catheters HX of frequent UTI   Quantity:  12 each   Refills:  1       * order for DME   Used for:  Morbid obesity, unspecified obesity type (H), Vertigo, Generalized muscle weakness, Physical deconditioning        High back guerrero wheel chair MICHELLE 99 DX: morbid obesity, generalized weakness, physical deconditioning, chronic vertigo   Quantity:  1 Device   Refills:  0       pravastatin 10 MG tablet   Commonly known as:  PRAVACHOL   Used for:  Hyperlipidemia LDL goal <100        TAKE ONE TABLET BY MOUTH EVERY DAY   Quantity:  90 tablet   Refills:  3       predniSONE 5 MG tablet   Commonly known as:  DELTASONE   Used for:  Kidney replaced by transplant        Dose:  5 mg   Take 1 tablet (5 mg) by mouth daily   Quantity:  90 tablet   Refills:  3       simethicone 125 MG Chew chewable tablet   Commonly known as:  MYLICON        Dose:  125 mg   Take 1 tablet (125 mg) by mouth as needed for intestinal gas   Quantity:  120 tablet   Refills:  0       TraMADol HCl 50 MG Tbdp   Indication:  Moderate to Moderately Severe Pain   Used for:  Chronic shoulder pain, unspecified laterality        Dose:  50 mg   Take 50 mg by mouth 3 times daily as needed   Quantity:  90 tablet   Refills:  3       TRILEPTAL 300 MG tablet   Used for:  Diabetic polyneuropathy associated with diabetes mellitus due to underlying condition (H)   Generic drug:  OXcarbazepine        Dose:  300 mg   Take 1 tablet (300 mg) by mouth 2 times daily For pain   Quantity:  180 tablet   Refills:  3       vitamin D 2000 UNITS tablet   Used for:  Vitamin deficiency        Dose:  4000 Units   Take 4,000 Units by mouth 2 times daily   Quantity:  100 tablet   Refills:  3       * Notice:  This list has 4 medication(s) that are the same as other medications prescribed for you. Read the directions carefully, and ask your doctor or other  care provider to review them with you.      STOP taking     ciprofloxacin 500 MG tablet   Commonly known as:  CIPRO                Where to get your medicines      These medications were sent to [facility] [place], [place] - [address]  [address] [place] [address]     Phone:  [phone]     tacrolimus 1 MG capsule         Some of these will need a paper prescription and others can be bought over the counter. Ask your nurse if you have questions.     Bring a paper prescription for each of these medications     diphenoxylate-atropine 2.5-0.025 MG per tablet                Protect others around you: Learn how to safely use, store and throw away your medicines at www.disposemymeds.org.             Medication List: This is a list of all your medications and when to take them. Check marks below indicate your daily home schedule. Keep this list as a reference.      Medications           Morning Afternoon Evening Bedtime As Needed    ACCU-CHEK COMPACT PLUS test strip   Use to test blood sugar 3 times daily or as directed.  Ok to substitute alternative if insurance prefers.   Generic drug:  blood glucose monitoring                                ACETAMINOPHEN PO   Take 325-650 mg by mouth every 4 hours as needed.                                aspirin 81 MG chewable tablet   Take 1 tablet (81 mg) by mouth daily Starting 1 week after your kidney biopsy   Last time this was given:  81 mg on 11/19/2017  9:23 AM                                cinacalcet 30 MG tablet   Commonly known as:  SENSIPAR   Take 1 tablet (30 mg) by mouth daily   Last time this was given:  30 mg on 11/19/2017  9:23 AM                                cloNIDine 0.1 MG tablet   Commonly known as:  CATAPRES   TAKE ONE TABLET BY MOUTH TWICE A DAY   Last time this was given:  0.1 mg on 11/19/2017  9:23 AM                                Cranberry 400 MG Tabs   Take 400 mg by mouth 2 times daily                                 cyanocobalamin 1000 MCG/ML injection   Commonly known as:  VITAMIN B12   Inject 1 mL (1,000 mcg) into the muscle every 30 days                                * diphenoxylate-atropine 2.5-0.025 MG per tablet   Commonly known as:  LOMOTIL   Take 1 tablet by mouth 4 times daily as needed for diarrhea   Last time this was given:  1 tablet on 11/19/2017 12:23 PM                                * diphenoxylate-atropine 2.5-0.025 MG per tablet   Commonly known as:  LOMOTIL   Take 2 tablets by mouth 4 times daily as needed for diarrhea   Last time this was given:  1 tablet on 11/19/2017 12:23 PM                                famotidine 20 MG tablet   Commonly known as:  PEPCID   Take 1 tablet (20 mg) by mouth 2 times daily   Last time this was given:  20 mg on 11/18/2017  8:22 PM                                gabapentin 300 MG capsule   Commonly known as:  NEURONTIN   Take 3 capsules (900 mg) by mouth 2 times daily For pain   Last time this was given:  900 mg on 11/19/2017  9:23 AM                                insulin aspart 100 UNIT/ML injection   Commonly known as:  NovoLOG FLEXPEN   Inject 10 units under the skin 2 times daily before meals as directed. Add sliding scale as needed. Avg use 25 units per day   Last time this was given:  8 Units on 11/19/2017  9:22 AM                                insulin glargine 100 UNIT/ML injection   Commonly known as:  LANTUS SOLOSTAR   Inject 24 units under the skin daily.   Last time this was given:  24 Units on 11/18/2017 10:43 PM                                insulin pen needle 30G X 8 MM   Use as directed with Lantus                                labetalol 300 MG tablet   Commonly known as:  NORMODYNE   Take 150 mg by mouth 2 times daily Take 1/2 tablet (150mg) =300mg. Hold for systolic BP less than 120.   Last time this was given:  100 mg on 11/19/2017  9:23 AM                                lactobacillus rhamnosus (GG) capsule   Take 1 capsule by mouth 2 times daily                                 levothyroxine 50 MCG tablet   Commonly known as:  SYNTHROID   Take 1 tablet (50 mcg) by mouth daily   Last time this was given:  50 mcg on 11/19/2017  9:23 AM                                lidocaine 2 % topical gel   Commonly known as:  XYLOCAINE   Apply topically as needed for moderate pain 10 ML every 3 weeks                                loratadine 10 MG tablet   Commonly known as:  CLARITIN   Take 1 tablet (10 mg) by mouth daily                                meclizine 12.5 MG tablet   Commonly known as:  ANTIVERT   Take 1 tablet (12.5 mg) by mouth 3 times daily as needed for dizziness   Last time this was given:  12.5 mg on 11/17/2017 11:06 PM                                ondansetron 4 MG ODT tab   Commonly known as:  ZOFRAN ODT   Take 1-2 tablets (4-8 mg) by mouth every 8 hours as needed for nausea                                order for DME   Equipment being ordered: Johnson Catheters - 18 french                                * order for DME   Equipment being ordered: Blue Chux [facility]                                * order for DME   Equipment being ordered: Ahuja FX CPAP interface (nasal pillows), size XS.                                * order for DME   Equipment being ordered: Silver Impregnated catheters HX of frequent UTI                                * order for DME   High back guerrero wheel chair MICHELLE 99 DX: morbid obesity, generalized weakness, physical deconditioning, chronic vertigo                                pravastatin 10 MG tablet   Commonly known as:  PRAVACHOL   TAKE ONE TABLET BY MOUTH EVERY DAY                                predniSONE 5 MG tablet   Commonly known as:  DELTASONE   Take 1 tablet (5 mg) by mouth daily   Last time this was given:  5 mg on 11/19/2017  9:23 AM                                simethicone 125 MG Chew chewable tablet   Commonly known as:  MYLICON   Take 1 tablet (125 mg) by mouth as needed for intestinal gas                                 tacrolimus 1 MG capsule   Commonly known as:  GENERIC EQUIVALENT   Take 2 capsules (2 mg) by mouth every evening (total dose 3.5 mg twice daily)   Last time this was given:  3 mg on 11/19/2017  9:22 AM                                TraMADol HCl 50 MG Tbdp   Take 50 mg by mouth 3 times daily as needed                                TRILEPTAL 300 MG tablet   Take 1 tablet (300 mg) by mouth 2 times daily For pain   Last time this was given:  300 mg on 11/19/2017  9:23 AM   Generic drug:  OXcarbazepine                                vitamin D 2000 UNITS tablet   Take 4,000 Units by mouth 2 times daily   Last time this was given:  4,000 Units on 11/19/2017  9:23 AM                                * Notice:  This list has 6 medication(s) that are the same as other medications prescribed for you. Read the directions carefully, and ask your doctor or other care provider to review them with you.

## 2017-11-13 NOTE — ED NOTES
[facility]  ED Nurse Handoff Report    ED Chief complaint: Altered Mental Status      ED Diagnosis:   Final diagnoses:   None       Code Status: Full  Allergies:   Allergies   Allergen Reactions     Bactrim Ds [Sulfamethoxazole W/Trimethoprim]      Creatinine level increased     Atorvastatin Calcium      myalgias, muscle aches     Codeine Nausea and Vomiting     Darvocet [Propoxyphene N-Apap] Nausea and Vomiting     Hydromorphone      Levofloxacin Other (See Comments) and Diarrhea     hallucinations     Morphine      Nausea and vomiting     Niaspan [Niacin] GI Disturbance     Flushing even at low dose, GI upset     Percocet [Oxycodone-Acetaminophen]      Vomiting after taking med couple of times     Vicodin [Hydrocodone-Acetaminophen] Nausea and Vomiting       Activity level - Baseline/Home:  Total Care    Activity Level - Current:   Total Care     Needed?: No    Isolation: Yes  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    Focused Assessment: EMS reports needing to bag pt in order to maintain O2 sats.  Upon arrival the pt was maintaining her own airway with a gag reflex present.  Pt has been slowly downgraded from 15 liters on a non-rebreather mask, to a nasal canula, to room air.  Pt currently maintaining O2 sats above 95% on RA, although she has brief periods of apnea.  Pt maintaining good ETCO2 waveform.  Pt lungs coarse in upper lobes  bilaterally.  Pt briefly opens eyes to physical stimulation and has been able to state her name.  Pt SR on cardiac monitor with systolic BP ranging from 170 to the low 200s.  Pt has a rico catheter at baseline, this was replaced on arrival.     Tests Performed: BMP, blood glucose, lactic acid CBC, VBG, UA/ UC, Head CT w/o contrast, chest X-ray.  Abnormal Results: Imaging unremarkable, UA shows many bacteria and WBCs (MD believes this is secondaryb to chronic colonization).  Blood glucose 294, creatinine 2.06 GFR 24.  Treatments provided: 20 g  And 18 G IVs placed.  Pt given 1 liter NS, and 1 mg narcan IV (this did not change her level of responsiveness).  As previously mentioned pt stated on non-rebreather mask at 15, tyhen a NC, and is now tolerating room air.      Family Comments:  at home, involved in cares.    OBS brochure/video discussed/provided to patient: N/A    ED Medications:   Medications   0.9% sodium chloride BOLUS (1,000 mLs Intravenous New Bag 11/13/17 1703)   naloxone (NARCAN) injection 1 mg (1 mg Intravenous Given 11/13/17 1622)       Drips infusing?:  No      ED NURSE PHONE NUMBER: [phone]

## 2017-11-14 ENCOUNTER — TELEPHONE (OUTPATIENT)
Dept: FAMILY MEDICINE | Facility: CLINIC | Age: 72
End: 2017-11-14

## 2017-11-14 LAB
ANION GAP SERPL CALCULATED.3IONS-SCNC: 10 MMOL/L (ref 3–14)
ANION GAP SERPL CALCULATED.3IONS-SCNC: 12 MMOL/L (ref 3–14)
BASE DEFICIT BLDA-SCNC: 2.5 MMOL/L
BUN SERPL-MCNC: 45 MG/DL (ref 7–30)
BUN SERPL-MCNC: 52 MG/DL (ref 7–30)
CALCIUM SERPL-MCNC: 7.5 MG/DL (ref 8.5–10.1)
CALCIUM SERPL-MCNC: 8 MG/DL (ref 8.5–10.1)
CHLORIDE SERPL-SCNC: 104 MMOL/L (ref 94–109)
CHLORIDE SERPL-SCNC: 108 MMOL/L (ref 94–109)
CO2 SERPL-SCNC: 20 MMOL/L (ref 20–32)
CO2 SERPL-SCNC: 22 MMOL/L (ref 20–32)
CREAT SERPL-MCNC: 1.76 MG/DL (ref 0.52–1.04)
CREAT SERPL-MCNC: 1.76 MG/DL (ref 0.52–1.04)
CREAT SERPL-MCNC: 2.02 MG/DL (ref 0.52–1.04)
ERYTHROCYTE [DISTWIDTH] IN BLOOD BY AUTOMATED COUNT: 13.7 % (ref 10–15)
GFR SERPL CREATININE-BSD FRML MDRD: 24 ML/MIN/1.7M2
GFR SERPL CREATININE-BSD FRML MDRD: 28 ML/MIN/1.7M2
GFR SERPL CREATININE-BSD FRML MDRD: 28 ML/MIN/1.7M2
GLUCOSE BLDC GLUCOMTR-MCNC: 106 MG/DL (ref 70–99)
GLUCOSE BLDC GLUCOMTR-MCNC: 110 MG/DL (ref 70–99)
GLUCOSE BLDC GLUCOMTR-MCNC: 111 MG/DL (ref 70–99)
GLUCOSE BLDC GLUCOMTR-MCNC: 121 MG/DL (ref 70–99)
GLUCOSE BLDC GLUCOMTR-MCNC: 122 MG/DL (ref 70–99)
GLUCOSE BLDC GLUCOMTR-MCNC: 125 MG/DL (ref 70–99)
GLUCOSE BLDC GLUCOMTR-MCNC: 126 MG/DL (ref 70–99)
GLUCOSE BLDC GLUCOMTR-MCNC: 128 MG/DL (ref 70–99)
GLUCOSE BLDC GLUCOMTR-MCNC: 137 MG/DL (ref 70–99)
GLUCOSE BLDC GLUCOMTR-MCNC: 138 MG/DL (ref 70–99)
GLUCOSE BLDC GLUCOMTR-MCNC: 158 MG/DL (ref 70–99)
GLUCOSE BLDC GLUCOMTR-MCNC: 181 MG/DL (ref 70–99)
GLUCOSE BLDC GLUCOMTR-MCNC: 226 MG/DL (ref 70–99)
GLUCOSE BLDC GLUCOMTR-MCNC: 89 MG/DL (ref 70–99)
GLUCOSE BLDC GLUCOMTR-MCNC: 95 MG/DL (ref 70–99)
GLUCOSE BLDC GLUCOMTR-MCNC: 97 MG/DL (ref 70–99)
GLUCOSE SERPL-MCNC: 136 MG/DL (ref 70–99)
GLUCOSE SERPL-MCNC: 305 MG/DL (ref 70–99)
HBA1C MFR BLD: 7.7 % (ref 4.3–6)
HCO3 BLD-SCNC: 22 MMOL/L (ref 21–28)
HCT VFR BLD AUTO: 34.4 % (ref 35–47)
HGB BLD-MCNC: 11.3 G/DL (ref 11.7–15.7)
MAGNESIUM SERPL-MCNC: 2 MG/DL (ref 1.6–2.3)
MCH RBC QN AUTO: 30.6 PG (ref 26.5–33)
MCHC RBC AUTO-ENTMCNC: 32.8 G/DL (ref 31.5–36.5)
MCV RBC AUTO: 93 FL (ref 78–100)
MRSA DNA SPEC QL NAA+PROBE: NEGATIVE
O2/TOTAL GAS SETTING VFR VENT: 50 %
OXYHGB MFR BLD: 99 % (ref 92–100)
PCO2 BLD: 37 MM HG (ref 35–45)
PH BLD: 7.38 PH (ref 7.35–7.45)
PLATELET # BLD AUTO: 152 10E9/L (ref 150–450)
PO2 BLD: 207 MM HG (ref 80–105)
POTASSIUM SERPL-SCNC: 3.6 MMOL/L (ref 3.4–5.3)
POTASSIUM SERPL-SCNC: 4.3 MMOL/L (ref 3.4–5.3)
PROCALCITONIN SERPL-MCNC: 0.09 NG/ML
RBC # BLD AUTO: 3.69 10E12/L (ref 3.8–5.2)
SODIUM SERPL-SCNC: 136 MMOL/L (ref 133–144)
SODIUM SERPL-SCNC: 140 MMOL/L (ref 133–144)
SPECIMEN SOURCE: NORMAL
TACROLIMUS BLD-MCNC: 5.5 UG/L (ref 5–15)
TACROLIMUS BLD-MCNC: 5.8 UG/L (ref 5–15)
TACROLIMUS BLD-MCNC: 8.3 UG/L (ref 5–15)
TME LAST DOSE: NORMAL H
WBC # BLD AUTO: 7.5 10E9/L (ref 4–11)

## 2017-11-14 PROCEDURE — 00000146 ZZHCL STATISTIC GLUCOSE BY METER IP

## 2017-11-14 PROCEDURE — 40000141 ZZH STATISTIC PERIPHERAL IV START W/O US GUIDANCE

## 2017-11-14 PROCEDURE — 25000132 ZZH RX MED GY IP 250 OP 250 PS 637: Mod: GY | Performed by: SURGERY

## 2017-11-14 PROCEDURE — 80197 ASSAY OF TACROLIMUS: CPT | Performed by: SURGERY

## 2017-11-14 PROCEDURE — 25000128 H RX IP 250 OP 636: Performed by: SURGERY

## 2017-11-14 PROCEDURE — 25000128 H RX IP 250 OP 636: Performed by: NURSE PRACTITIONER

## 2017-11-14 PROCEDURE — A9270 NON-COVERED ITEM OR SERVICE: HCPCS | Mod: GY | Performed by: INTERNAL MEDICINE

## 2017-11-14 PROCEDURE — 85027 COMPLETE CBC AUTOMATED: CPT | Performed by: SURGERY

## 2017-11-14 PROCEDURE — 40000275 ZZH STATISTIC RCP TIME EA 10 MIN

## 2017-11-14 PROCEDURE — G0179 MD RECERTIFICATION HHA PT: HCPCS

## 2017-11-14 PROCEDURE — 87640 STAPH A DNA AMP PROBE: CPT | Performed by: SURGERY

## 2017-11-14 PROCEDURE — 25000128 H RX IP 250 OP 636: Performed by: INTERNAL MEDICINE

## 2017-11-14 PROCEDURE — 99207 ZZC APP CREDIT; MD BILLING SHARED VISIT: CPT | Performed by: NURSE PRACTITIONER

## 2017-11-14 PROCEDURE — 80048 BASIC METABOLIC PNL TOTAL CA: CPT | Performed by: SURGERY

## 2017-11-14 PROCEDURE — 99291 CRITICAL CARE FIRST HOUR: CPT | Performed by: INTERNAL MEDICINE

## 2017-11-14 PROCEDURE — 25000125 ZZHC RX 250: Performed by: SURGERY

## 2017-11-14 PROCEDURE — 84145 PROCALCITONIN (PCT): CPT | Performed by: SURGERY

## 2017-11-14 PROCEDURE — 25000132 ZZH RX MED GY IP 250 OP 250 PS 637: Mod: GY | Performed by: INTERNAL MEDICINE

## 2017-11-14 PROCEDURE — 83036 HEMOGLOBIN GLYCOSYLATED A1C: CPT | Performed by: SURGERY

## 2017-11-14 PROCEDURE — 94003 VENT MGMT INPAT SUBQ DAY: CPT

## 2017-11-14 PROCEDURE — 83735 ASSAY OF MAGNESIUM: CPT | Performed by: SURGERY

## 2017-11-14 PROCEDURE — P9041 ALBUMIN (HUMAN),5%, 50ML: HCPCS | Performed by: SURGERY

## 2017-11-14 PROCEDURE — A9270 NON-COVERED ITEM OR SERVICE: HCPCS | Mod: GY | Performed by: SURGERY

## 2017-11-14 PROCEDURE — 82565 ASSAY OF CREATININE: CPT | Performed by: SURGERY

## 2017-11-14 PROCEDURE — 82805 BLOOD GASES W/O2 SATURATION: CPT | Performed by: SURGERY

## 2017-11-14 PROCEDURE — 20000003 ZZH R&B ICU

## 2017-11-14 PROCEDURE — 27210995 ZZH RX 272: Performed by: NURSE PRACTITIONER

## 2017-11-14 PROCEDURE — 40000008 ZZH STATISTIC AIRWAY CARE

## 2017-11-14 PROCEDURE — 36415 COLL VENOUS BLD VENIPUNCTURE: CPT | Performed by: SURGERY

## 2017-11-14 PROCEDURE — 87641 MR-STAPH DNA AMP PROBE: CPT | Performed by: SURGERY

## 2017-11-14 PROCEDURE — 25000131 ZZH RX MED GY IP 250 OP 636 PS 637: Mod: GY | Performed by: SURGERY

## 2017-11-14 PROCEDURE — S0028 INJECTION, FAMOTIDINE, 20 MG: HCPCS | Performed by: SURGERY

## 2017-11-14 RX ORDER — CLONIDINE HYDROCHLORIDE 0.1 MG/1
0.1 TABLET ORAL 2 TIMES DAILY
Status: DISCONTINUED | OUTPATIENT
Start: 2017-11-14 | End: 2017-11-19 | Stop reason: HOSPADM

## 2017-11-14 RX ORDER — FENTANYL CITRATE 50 UG/ML
25 INJECTION, SOLUTION INTRAMUSCULAR; INTRAVENOUS
Status: DISCONTINUED | OUTPATIENT
Start: 2017-11-14 | End: 2017-11-16

## 2017-11-14 RX ORDER — LABETALOL HYDROCHLORIDE 5 MG/ML
10 INJECTION, SOLUTION INTRAVENOUS
Status: DISCONTINUED | OUTPATIENT
Start: 2017-11-14 | End: 2017-11-19 | Stop reason: HOSPADM

## 2017-11-14 RX ORDER — ASPIRIN 81 MG/1
81 TABLET, CHEWABLE ORAL DAILY
Status: DISCONTINUED | OUTPATIENT
Start: 2017-11-14 | End: 2017-11-19 | Stop reason: HOSPADM

## 2017-11-14 RX ORDER — TACROLIMUS 0.5 MG/1
0.5 CAPSULE ORAL 2 TIMES DAILY
Status: DISCONTINUED | OUTPATIENT
Start: 2017-11-14 | End: 2017-11-14

## 2017-11-14 RX ORDER — CINACALCET 30 MG/1
30 TABLET, FILM COATED ORAL DAILY
Status: DISCONTINUED | OUTPATIENT
Start: 2017-11-14 | End: 2017-11-14

## 2017-11-14 RX ORDER — ALBUMIN, HUMAN INJ 5% 5 %
25 SOLUTION INTRAVENOUS ONCE
Status: DISCONTINUED | OUTPATIENT
Start: 2017-11-14 | End: 2017-11-14

## 2017-11-14 RX ORDER — HYDRALAZINE HYDROCHLORIDE 20 MG/ML
10 INJECTION INTRAMUSCULAR; INTRAVENOUS EVERY 4 HOURS PRN
Status: DISCONTINUED | OUTPATIENT
Start: 2017-11-14 | End: 2017-11-19 | Stop reason: HOSPADM

## 2017-11-14 RX ORDER — SODIUM CHLORIDE 9 MG/ML
INJECTION, SOLUTION INTRAVENOUS CONTINUOUS
Status: DISCONTINUED | OUTPATIENT
Start: 2017-11-14 | End: 2017-11-14

## 2017-11-14 RX ORDER — TACROLIMUS 1 MG/1
3 CAPSULE ORAL 2 TIMES DAILY
Status: DISCONTINUED | OUTPATIENT
Start: 2017-11-14 | End: 2017-11-14 | Stop reason: CLARIF

## 2017-11-14 RX ORDER — LEVOTHYROXINE SODIUM 50 UG/1
50 TABLET ORAL DAILY
Status: DISCONTINUED | OUTPATIENT
Start: 2017-11-14 | End: 2017-11-19 | Stop reason: HOSPADM

## 2017-11-14 RX ORDER — SODIUM CHLORIDE 450 MG/100ML
INJECTION, SOLUTION INTRAVENOUS CONTINUOUS
Status: DISCONTINUED | OUTPATIENT
Start: 2017-11-14 | End: 2017-11-17

## 2017-11-14 RX ADMIN — MEROPENEM AND SODIUM CHLORIDE 500 MG: 500 INJECTION, SOLUTION INTRAVENOUS at 09:28

## 2017-11-14 RX ADMIN — MEROPENEM AND SODIUM CHLORIDE 500 MG: 500 INJECTION, SOLUTION INTRAVENOUS at 17:59

## 2017-11-14 RX ADMIN — SODIUM CHLORIDE: 9 INJECTION, SOLUTION INTRAVENOUS at 09:28

## 2017-11-14 RX ADMIN — CINACALCET HYDROCHLORIDE 30 MG: 30 TABLET, COATED ORAL at 08:12

## 2017-11-14 RX ADMIN — CHLORHEXIDINE GLUCONATE 15 ML: 1.2 RINSE ORAL at 20:35

## 2017-11-14 RX ADMIN — CHLORHEXIDINE GLUCONATE 15 ML: 1.2 RINSE ORAL at 07:45

## 2017-11-14 RX ADMIN — LEVOTHYROXINE SODIUM 50 MCG: 50 TABLET ORAL at 13:49

## 2017-11-14 RX ADMIN — SODIUM CHLORIDE: 4.5 INJECTION, SOLUTION INTRAVENOUS at 14:16

## 2017-11-14 RX ADMIN — CLONIDINE HYDROCHLORIDE 0.1 MG: 0.1 TABLET ORAL at 13:49

## 2017-11-14 RX ADMIN — HYDROCORTISONE SODIUM SUCCINATE 50 MG: 100 INJECTION, POWDER, FOR SOLUTION INTRAMUSCULAR; INTRAVENOUS at 01:20

## 2017-11-14 RX ADMIN — ALBUMIN (HUMAN) 25 G: 12.5 SOLUTION INTRAVENOUS at 07:44

## 2017-11-14 RX ADMIN — SODIUM CHLORIDE 2 UNITS/HR: 9 INJECTION, SOLUTION INTRAVENOUS at 09:28

## 2017-11-14 RX ADMIN — FENTANYL CITRATE 25 MCG: 50 INJECTION, SOLUTION INTRAMUSCULAR; INTRAVENOUS at 18:14

## 2017-11-14 RX ADMIN — ASPIRIN 81 MG 81 MG: 81 TABLET ORAL at 13:49

## 2017-11-14 RX ADMIN — PROPOFOL 40 MCG/KG/MIN: 10 INJECTION, EMULSION INTRAVENOUS at 03:32

## 2017-11-14 RX ADMIN — MEROPENEM AND SODIUM CHLORIDE 500 MG: 500 INJECTION, SOLUTION INTRAVENOUS at 01:20

## 2017-11-14 RX ADMIN — TACROLIMUS 83.3 MCG/HR: 5 INJECTION, SOLUTION INTRAVENOUS at 18:17

## 2017-11-14 RX ADMIN — PROPOFOL 30 MCG/KG/MIN: 10 INJECTION, EMULSION INTRAVENOUS at 07:13

## 2017-11-14 RX ADMIN — HYDROCORTISONE SODIUM SUCCINATE 50 MG: 100 INJECTION, POWDER, FOR SOLUTION INTRAMUSCULAR; INTRAVENOUS at 20:35

## 2017-11-14 RX ADMIN — VITAMIN D, TAB 1000IU (100/BT) 4000 UNITS: 25 TAB at 13:49

## 2017-11-14 RX ADMIN — FAMOTIDINE 20 MG: 10 INJECTION, SOLUTION INTRAVENOUS at 23:22

## 2017-11-14 RX ADMIN — CLONIDINE HYDROCHLORIDE 0.1 MG: 0.1 TABLET ORAL at 20:36

## 2017-11-14 RX ADMIN — LABETALOL HCL 100 MG: 100 TABLET, FILM COATED ORAL at 08:36

## 2017-11-14 RX ADMIN — VITAMIN D, TAB 1000IU (100/BT) 4000 UNITS: 25 TAB at 20:36

## 2017-11-14 RX ADMIN — LABETALOL HCL 100 MG: 100 TABLET, FILM COATED ORAL at 20:36

## 2017-11-14 RX ADMIN — HYDROCORTISONE SODIUM SUCCINATE 50 MG: 100 INJECTION, POWDER, FOR SOLUTION INTRAMUSCULAR; INTRAVENOUS at 07:45

## 2017-11-14 NOTE — CONSULTS
ID consult dictated IMP 1 72 yo female acute illness, ? Sepsis, complex hx, well known to us, ? Urosepsis    REC continue same, await cxs

## 2017-11-14 NOTE — H&P
[facility]    History and Physical  Hospitalist       Date of Admission:  11/13/2017    Assessment & Plan   Gem Jade is a 72 yo F with a history of kidney transplant (1999), neurogenic bladder with chronic rico and recurrent UTI's who was brought to the ED by her  for altered mental status with unresponsiveness and episode of apnea at some points EMS wanted to intubated at the scene.    Head CT was negative for acute changes, she had a similar presentation in the past with diagnosis of septic encephalopathy from urosepsis.  She had a dose of Narcan in the emergency room without significant improvement in the mental status.    After discussion the case with emergency we  asked  to consult critical care for possible need for intubation ,intensivist was consulted and patient's will be   intubated and transferred care to the critical care...      1  Altered mental status:   Data:   History of chronic Rico with multiple urosepsis in the past with similar presentation  Presented with episodes  of apnea and AMS   CT scan had negative  No fever white blood cell count six over she is immunocompromised on many immune suppressant and steroid which can mask fever and leukocytosis  Blood pressure at presentation 170s to 190s  Hemodynamically stable now  She was taking Cipro at home for recent UTI  History of diabetes her blood sugar at presentation in 200s  VBG :  7.29/54/63    Differential diagnoses here: Septic encephalopathic with urosepsis?  Meningitis, CVA, polypharmacy and overdose, seizure, hypertension emergency? PRES syndrom .....    Plan:  Get MRI rule out CVA, also r/o PRES syndrome   Get blood and urine  Culture  Start rx  empirically with antibiotics with meropenem with history of ESBL and vancomycin  Consider infectious disease consultation  Consider neurologic consultation possible need for EEG to rule out subclinical seizure  Consider Narcan , p.r.n. for apnea episodes ( didn't see  any opiates medication and her home medication list)  Patient will need intubation if no improvement with worsening off apnea episode  Check TSH with history of hypothyroid  Monitor blood pressure and now with p.r.n. hydralazine for systolic more than 180  Consider  NG tube if no improvement in mental status and next 24 hours  Cont with IVF   Pepcid for PPx  Get urine toxicology and basic to excellent toxicology panel    2.  H/o renal transplant on immunosuppressant  Hold immunosuppressant for now  Consider start  hydrocortisone IV with risk of adrenal insufficiency as the patient on chronic steroids  Avoid nephrotoxic    3.  Diabetes hold long-acting insulin and continue the sliding scale for now    4.  Hypothyroidism which home dose to IV she's taking 50  g at home will switch it to IV  25    5.  History of hypertension she is at the clonidine at home for now will treat with p.r.n. Hydralazine, see #1            DVT Prophylaxis: Heparin SQ  Code Status: Full Code    Disposition: [facility] Physician   Marivel Barcenas NP    Chief Complaint   AMS     History is obtained from the patient and emergency department physician    History of Present Illness   Gem Jade is a 71 year old female with a history of renal transplant , multiple UTI  Sepsis with chronic rico ,  HTN, hyperlipidemia, and diabetes who presents to the emergency department via EMS for evaluation of respiratory distress. Of note, the patient is completely bed ridden and has a rico cathter. Of note, the patient was seen at the [facility] for sepsis with a similar presentations as today. Since this morning, the patient has been unresponsive and note she has not had oral intact since yesterday. This prompted the patient's hussband to call EMS. In route, patient was being bagged and had blood pressure readings of 120/70 and one that was 60/40, blood sugar of 369, and an axial temp of 95.5. EMS only found the patient to react  very minimally to a very deep sternal rub. On arrival, the patient is on O2 but unresponsive.   She had a basic blood work in the emergency and head CT didn't show significant changes.  History was obtained from emergency department as the patient is alternate.    Past Medical History    Past Medical History:   Diagnosis Date     Background diabetic retinopathy(362.01) (H)     extensive diabetic retinopathy, s/p multiple laser treatments     Diabetic gastroparesis (H)     followed by [facility] (Dr. 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 Taking?   ACETAMINOPHEN PO  Spouse/Significant Other Yes No   Sig: Take 325-650 mg by mouth every 4 hours as needed.   Cholecalciferol (VITAMIN D) 2000 UNITS tablet   Yes No   Sig: Take 4,000 Units by mouth 2 times daily   Cranberry 400 MG TABS  Spouse/Significant Other Yes No   Sig: Take 400 mg by mouth 2 times daily   Lactobacillus Rhamnosus, GG, (CULTURELL) capsule   No No   Sig: Take 1 capsule by mouth 2 times daily   ORDER FOR DME   No No   Sig: Equipment being ordered: Johnson Catheters - 18 french   OXcarbazepine (TRILEPTAL) 300 MG tablet   Yes No   Sig: Take 1 tablet (300 mg) by mouth 2 times  daily For pain   TraMADol HCl 50 MG TBDP   No No   Sig: Take 50 mg by mouth 3 times daily as needed   Patient taking differently: Take 50 mg by mouth 2 times daily    aspirin 81 MG chewable tablet   No No   Sig: Take 1 tablet (81 mg) by mouth daily Starting 1 week after your kidney biopsy   blood glucose monitoring (ACCU-CHEK COMPACT PLUS) test strip   Yes No   Sig: Use to test blood sugar 3 times daily or as directed.  Ok to substitute alternative if insurance prefers.   cinacalcet (SENSIPAR) 30 MG tablet   No No   Sig: Take 1 tablet (30 mg) by mouth daily   ciprofloxacin (CIPRO) 500 MG tablet   No No   Sig: Take 1 tablet (500 mg) by mouth every 12 hours for 7 days   cloNIDine (CATAPRES) 0.1 MG tablet   No No   Sig: TAKE ONE TABLET BY MOUTH TWICE A DAY   cyanocobalamin (VITAMIN B12) 1000 MCG/ML injection   No No   Sig: Inject 1 mL (1,000 mcg) into the muscle every 30 days   diphenoxylate-atropine (LOMOTIL) 2.5-0.025 MG per tablet   No No   Sig: Take 1 tablet by mouth 4 times daily as needed for diarrhea   famotidine (PEPCID) 20 MG tablet  Spouse/Significant Other Yes No   Sig: Take 1 tablet (20 mg) by mouth 2 times daily   gabapentin (NEURONTIN) 300 MG capsule   No No   Sig: Take 3 capsules (900 mg) by mouth 2 times daily For pain   glucagon (GLUCAGON EMERGENCY) 1 MG injection   Yes No   Sig: Inject 1 mg into the muscle once   insulin aspart (NOVOLOG FLEXPEN) 100 UNIT/ML injection   No No   Sig: Inject 10 units under the skin 2 times daily before meals as directed. Add sliding scale as needed. Avg use 25 units per day   insulin glargine (LANTUS SOLOSTAR) 100 UNIT/ML injection   No No   Sig: Inject 24 units under the skin daily.   insulin pen needle 30G X 8 MM   No No   Sig: Use as directed with Lantus   labetalol (NORMODYNE) 300 MG tablet   Yes No   Sig: Take 1/2 tablet (150mg) BID. Hold for systolic BP less than 120.   levothyroxine (SYNTHROID) 50 MCG tablet   No No   Sig: Take 1 tablet (50 mcg) by mouth daily    lidocaine (XYLOCAINE) 2 % topical gel   No No   Sig: Apply topically as needed for moderate pain 10 ML every 3 weeks   loratadine (CLARITIN) 10 MG tablet   No No   Sig: Take 1 tablet (10 mg) by mouth daily   meclizine (ANTIVERT) 12.5 MG tablet   No No   Sig: Take 1 tablet (12.5 mg) by mouth 3 times daily as needed for dizziness   ondansetron (ZOFRAN ODT) 4 MG disintegrating tablet   No No   Sig: Take 1-2 tablets (4-8 mg) by mouth every 8 hours as needed for nausea   order for DME   No No   Sig: Equipment being ordered: Blue Chux  Please send to [facility]   order for DME   No No   Sig: Equipment being ordered: Enconcert CPAP interface (nasal pillows), size XS.   order for DME   No No   Sig: Equipment being ordered: Silver Impregnated catheters  HX of frequent UTI   order for DME   No No   Sig: High back guerrero wheel chair  MICHELLE 99  DX: morbid obesity, generalized weakness, physical deconditioning, chronic vertigo   pravastatin (PRAVACHOL) 10 MG tablet   No No   Sig: TAKE ONE TABLET BY MOUTH EVERY DAY   predniSONE (DELTASONE) 5 MG tablet   No No   Sig: Take 1 tablet (5 mg) by mouth daily   simethicone (MYLICON) 125 MG CHEW   Yes No   Sig: Take 1 tablet (125 mg) by mouth as needed for intestinal gas   tacrolimus (GENERIC EQUIVALENT) 0.5 MG capsule   No No   Sig: Take 1 capsule (0.5 mg) by mouth 2 times daily (total dose 3.5 mg twice daily)   tacrolimus (GENERIC EQUIVALENT) 1 MG capsule   No No   Sig: Take 3 capsules (3 mg) by mouth 2 times daily (total dose 3.5 mg twice daily)      Facility-Administered Medications: None     Allergies   Allergies   Allergen Reactions     Bactrim Ds [Sulfamethoxazole W/Trimethoprim]      Creatinine level increased     Atorvastatin Calcium      myalgias, muscle aches     Codeine Nausea and Vomiting     Darvocet [Propoxyphene N-Apap] Nausea and Vomiting     Hydromorphone      Levofloxacin Other (See Comments) and Diarrhea     hallucinations     Morphine      Nausea and vomiting      Niaspan [Niacin] GI Disturbance     Flushing even at low dose, GI upset     Percocet [Oxycodone-Acetaminophen]      Vomiting after taking med couple of times     Vicodin [Hydrocodone-Acetaminophen] Nausea and Vomiting       Social History   Gem Jade  reports that she quit smoking about 30 years ago. She quit after 17.00 years of use. She does not have any smokeless tobacco history on file. She reports that she does not drink alcohol or use illicit drugs.    Family History   Gem Jade family history includes DIABETES in her brother and mother; HEART DISEASE in her father; Hypertension in her father.  Per Old records  Review of Systems     Unable to obtain with altered mental status  Physical Exam   Temp: 96.1  F (35.6  C) Temp src: Bladder BP: 177/79   Heart Rate: 59 Resp: 8 SpO2: 98 % O2 Device: Non-rebreather mask Oxygen Delivery: 15 LPM  Vital Signs with Ranges  Temp:  [94.5  F (34.7  C)-96.3  F (35.7  C)] 96.1  F (35.6  C)  Heart Rate:  [53-63] 59  Resp:  [6-33] 8  BP: (144-203)/() 177/79  SpO2:  [98 %-100 %] 98 %  227 lbs 0 oz    Gen.:  Patient is altered unresponsive unresponsive over the obese  Eyes: Pupil normal size sluggishly reactive to light  Lung: Clear, with episodes of apnea  Cardiovascular normal S1-S2, positive systolic murmur  Abdomen distended no tenderness , hypoactive bowel sound  Limbs : No edema multiple joints deformities in the metatarsals, pulse positive bilaterally  Neurologically:  Unresponsive, GCS six  Does not follow commands  Did not  notice any facial droops  Unable to complete full neuro exam with mental status  + gag reflex       Data   Data reviewed today:  I personally reviewed CT scan and EKG    Recent Labs  Lab 11/13/17  1613 11/13/17  1152 11/09/17  0735 11/08/17  1950 11/08/17  0539   WBC 6.0 8.2  --   --  6.2   HGB 11.3* 10.3*  --   --  10.5*   MCV 95 97  --   --  98    163 151  --  142*     --  137  --  139   POTASSIUM 4.2  --  3.7 4.3 3.0*    CHLORIDE 103  --  106  --  106   CO2 25  --  22  --  23   BUN 47*  --  32*  --  40*   CR 2.06*  --  1.52*  --  1.47*   ANIONGAP 9  --  8  --  10   ROGER 7.4*  --  8.1*  --  7.9*   *  --  122*  --  180*   ALBUMIN 3.0*  --   --   --   --    PROTTOTAL 7.7  --   --   --   --    BILITOTAL 0.2  --   --   --   --    ALKPHOS 67  --   --   --   --    ALT 11  --   --   --   --    AST 8  --   --   --   --        Imaging:  Recent Results (from the past 24 hour(s))   XR Chest Port 1 View    Narrative    PORTABLE CHEST ONE VIEW  11/13/2017 4:29 PM     HISTORY: Shortness of breath and altered mental status.      COMPARISON: Chest x-ray 11/6/2017.      Impression    IMPRESSION: Portable view of the chest is performed in the supine  position. Lungs are clear but hypoaerated. Heart is enlarged but  unchanged. Aorta is calcified and tortuous. No obvious pneumothorax on  this supine view. No definite pleural effusions.    THEA MAJOR MD   CT Head w/o Contrast    Narrative    CT SCAN OF THE HEAD WITHOUT CONTRAST  11/13/2017  4:43 PM     HISTORY: Altered mental status and brandie-arrest.    TECHNIQUE: Axial images of the head and coronal reformations without  IV contrast material. Radiation dose for this scan was reduced using  automated exposure control, adjustment of the mA and/or kV according  to patient size, or iterative reconstruction technique.    COMPARISON: 11/6/2017    FINDINGS: There is generalized atrophy of the brain. There is low  attenuation in the white matter of the cerebral hemispheres consistent  with sequelae of small vessel ischemic disease. There is no evidence  of intracranial hemorrhage, mass, acute infarct or anomaly.     The visualized portions of the sinuses and mastoids appear normal.  There is no evidence of trauma.       Impression    IMPRESSION:   1. No acute abnormality.  2. Atrophy of the brain. White matter changes consistent with sequelae  of small vessel ischemic disease. This is unchanged.    ELIZABETH  MD ALFA

## 2017-11-14 NOTE — PROGRESS NOTES
[facility] ICU RESPIRATORY NOTE  Date of Admission: 11/13/2017  Date of Intubation (most recent): 11/13/2017  Reason for Mechanical Ventilation: Respiratory distress  Number of Days on Mechanical Ventilation: 1  Met Criteria for Pressure Support Trial: no  Reason for No Pressure Support Trial: per MD    Significant Events Today: none    Ventilation Mode: CMV/AC  FiO2 (%): 70 %  Rate Set (breaths/minute): 18 breaths/min  Tidal Volume Set (mL): 500 mL  PEEP (cm H2O): 5 cmH2O  Oxygen Concentration (%): 70 %  Resp: 17    ABG Results:    Recent Labs  Lab 11/13/17  1830 11/13/17  1731   PH 7.25*  --    PCO2 55*  --    PO2 133*  --    HCO3 24  --    O2PER  --  Canceled, Test credited     ETT appearance on chest x-ray:    Plan:  Continue full ventilator support overnight. Wean as tolerated.    Leoncio Tran RT

## 2017-11-14 NOTE — H&P
ICU Staff:    I examined the patient, Gem Jade, in the ICU.  I reviewed the patient's medical record and the progress notes during the admission to the ICU.  The patient is a 71 year old woman admitted to the ICU for sepsis. The patient was intubated in the ED for GCS of 7 and inability to protect her airway. The patient has presumed UTI and an indwelling Rico cath; she also has a history of kidney transplant in 1999 with neurogenic bladder and a chronic rico. She has recurrent UTI's who was brought to the ED today by her  for altered mental status. The patient has been bedbound for the last 6 years due to vertigo and Charcot foot. According to her  usually her mental status is sharp; however, if she develops a UTI her mental status worsens and she may even become unresponsive. She had a UTI 3 weeks ago; and was treated with 2 weeks of Augmentin and finished that course 4 days ago. Since Sunday she hasn't been eating or drinking anything including her medications.       PAST MEDICAL HISTORY:  Past Medical History:   Diagnosis Date     Background diabetic retinopathy(362.01) (H)     extensive diabetic retinopathy, s/p multiple laser treatments     Diabetic gastroparesis (H)     followed by [facility] (Dr. 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)      Urinary tract infection, site not specified     Chronic UTIs     UTI (urinary tract infection) due to urinary indwelling catheter (H)         PAST SURGICAL HISTORY:  Past  "Surgical History:   Procedure Laterality Date     C NONSPECIFIC PROCEDURE  10/99    kidney transplant     C NONSPECIFIC PROCEDURE  8/00    MRI head, lumbar spine, pelvis     C TOTAL KNEE ARTHROPLASTY  3/03    Knee Replacement, Total right, post op PE     CHOLECYSTECTOMY       HC LEFT HEART CATHETERIZATION  1999    Cardiac Cath, Left Heart, Negative  (@ [facility])     HC LEFT HEART CATHETERIZATION  4/05    normal coronary angiogram at [facility], had mild troponin rise (0.71)     HC REMV CATARACT EXTRACAP,INSERT LENS  8/04    bilateral cataract repaired, left eye         ALLERGIES:  Allergies   Allergen Reactions     Bactrim Ds [Sulfamethoxazole W/Trimethoprim]      Creatinine level increased     Atorvastatin Calcium      myalgias, muscle aches     Codeine Nausea and Vomiting     Darvocet [Propoxyphene N-Apap] Nausea and Vomiting     Hydromorphone      Levofloxacin Other (See Comments) and Diarrhea     hallucinations     Morphine      Nausea and vomiting     Niaspan [Niacin] GI Disturbance     Flushing even at low dose, GI upset     Percocet [Oxycodone-Acetaminophen]      Vomiting after taking med couple of times     Vicodin [Hydrocodone-Acetaminophen] Nausea and Vomiting        SOCIAL HISTORY:  Social History     Social History     Marital status:      Spouse name: N/A     Number of children: N/A     Years of education: N/A     Social History Main Topics     Smoking status: Former Smoker     Years: 17.00     Quit date: 6/1/1987     Smokeless tobacco: Not on file     Alcohol use No     Drug use: No     Sexual activity: Yes     Partners: Male     Other Topics Concern     Not on file     Social History Narrative       FAMILY HISTORY:  Family History   Problem Relation Age of Onset     DIABETES Mother      DIABETES Brother      Hypertension Father      [id number] pneumonia     HEART DISEASE Father         PHYSICAL EXAM:  Vital Signs: /79  Temp 97.5  F (36.4  C) (Oral)  Resp 15  Ht 5' 5.98\"  Wt 227 lb 4.7 oz  SpO2 " 100%  BMI 36.7 kg/m2   Gen.: Patient is intubated and sedated.   HEENT: PERR, the patient is intubated.   Chest: CTA bilaterally   Cor: RRR with II/VI systolic ejection murmur   Abd: obese, no tenderness detected, non-masses, well healed scars.   EXT: No edema multiple joints deformities in the metatarsals,?pulse positive bilaterally   Neuro: patient intubated and sedated.     Imaging Studies:    Head CT: negative for acute changes:   1. No acute abnormality.   2. Atrophy of the brain. White matter changes consistent with sequelae of small vessel ischemic disease. This is unchanged.?     A/P:   The patient is a 71 year old woman admitted to the ICU for sepsis. The patient was intubated in the ED for GCS of 7 and inability to protect her airway. The patient has presumed UTI and an indwelling Rico cath; she also has a history of kidney transplant in 1999 with neurogenic bladder and a chronic rico. She has recurrent UTI's who was brought to the ED today by her  for altered mental status. The patient has been bedbound for the last 6 years due to vertigo and Charcot foot. According to her  usually her mental status is sharp; however, if she develops a UTI her mental status worsens and she may even become unresponsive. She had a UTI 3 weeks ago; and was treated with 2 weeks of Augmentin and finished that course 4 days ago. Since Sunday she hasn't been eating or drinking anything including her medications. Will hold immunosuppression until am.      Neuro: The patient is intubated and sedated.  Will provide Fentanyl for pain.   Head CT is negative.  Will follow neuro exams during PS trials each am.    Cardiac: Will continue to follow BP and continued   Pulm: The patient was intubated for airway protection.  ABG demonstrates respiratory alkalosis will re-evacuate sedation and will decrease ventilation and recheck the ABG.    GI: Patient has OG tube in place.  Will keep NPO for now.    : Patient with renal  allograft; will check the Prograf level in the am and resume Prograf in the am.  Good UO for now.    ID: patient with likely recurrent UTI.  Will cover with extended spectrum antibiotic coverage.  Will follow cultures.   ENDO:  Patient on chronic steroids and now with sepsis.  Will cover with stress dose steroids.     The patient is critically ill and requires airway protection and ICU supportive e care for sepsis .  60 of Critical Care time exclusive of any time needed for bedside procedures on Monday 13 nov. 2017.      Robin Velasquez M.D., Ph.D.

## 2017-11-14 NOTE — PROGRESS NOTES
[facility]    Intensive Care Progress Note    Date of Service (when I saw the patient): 11/14/2017    Hospital day #1  ICU day #1    PROBLEM LIST:  1. Acute respiratory failure 2/2  2. Likely urosepsis 2/2 chronic indwelling rico catheter  3. Encephalapathy    Past 24 Hrs:  Patient was admitted to ICU overnight for suspected urosepsis.  Pt was intubated in the ED for GCS 7 and inability to protect airway.  Pt received narcan, meropenem, vancomycin, and 1 L bolus in ED.  Blood cultures and urine culture sent.  Pt hypertensive initially.  Now hemodynamically stable.  Stress dose steroids initiated as pt on chronic immunosuppressants and steroids.  Sedation discontinued.  Pressure support trial, low TV noted.     Assessment & Plan   Gem Jade is a 71 year old female with PMH s/p kidney transplant, neurogenic bladder with chronic indwelling catheter and recurrent UTIs who was admitted on 11/13/2017 for altered mental status and acute respiratory failure.      Neurology  1. Altered mental status 2/2 septic encephalopathy due to urosepsis  Plan:  -- Continuous analgesia and sedation stopped, PRN fentanyl started  -- Narcan ordered PRN  -- Hold  PTA gabapentin      Cardiovascular  1. Hypertension  2. Hyperlipidemia  Plan:  -- Continue with scheduled labetolol, on clonidine and labetolol PTA  -- PRN hydralazine  -- Restart PTA pravastain when appropriate    Pulmonary  1. Acute respiratory failure 2/2 urosepsis and septic encephalopathy  Plan:  -- Ventilator management, assess to wean to extubate today  -- PST today as mental status allows    Renal/Fluids/Electrolytes  1. History of renal transplant on immunosuppressant (tacrolimus) and chronic steroids (prednisone 5 mg daily), baseline creatinine 1.0-2.0  2. Likely urosepsis 2/2 chronic indwelling catheter due to neurogenic bladder  3. Chronic urinary tract infections  Plan:  -- Nephrology consult, appreciate recommendations  -- Check prograf level  today  -- Continue hydrocortisone IV  -- Restart cinacalcet when appropriate   -- Chronic indwelling catheter changed upon admission  -- Transplant coordinator, Porsha Margarethavelinojavier ([phone] M-W, [phone] Th-F), updated    ID   1. Likely urosepsis 2/2 chronic indwelling catheter due to neurogenic bladder - U/A noted small leukocyte esterase and WBC >100, UC pending.  Had recent UTI due to citrobacter; history of citrobacter/klebsiella/ESBL  2. History of chronic urinary tract infections (klebsiella, citrobacter, ESBL) 2/2 chronic indwelling catheter due to neurogenic bladder   Plan:  -- Infectious disease consult, appreciate recommendations  -- Continue vancomycin (pharmacy to dose) and meropenem  -- Blood cultures and urine culture pending  -- Continue vancomycin (pharmacy to dose) and meropenem  -- Chronic indwelling catheter changed upon admission  -- Lactic acid 0.8, WBC 6.0; immunosuppressed    GI  No active issues  Plan:  NPO while intubated    Nutrition  No active issues; morbidly obese, BMI 36.85  Plan:  NPO while intubated    Endocrine  1. Hypothyroidism  2. Type II diabetes mellitus with complications, diabetic retinopathy and neuropathy; chronic pain  3. History of renal transplant on immunosuppressant (tacrolimus) and chronic steroids (prednisone 5 mg daily), baseline creatinine 1.0-2.0  Plan:  -- Hemoglobin A1C 7.7  -- Hold PTA levothyroxine PO, pending extubation  -- Continue hydrocortisone IV stress dose   -- Continue insulin infusion per hyperglycemic protocol  -- Hypoglycemic protocol ordered  -- Glucose POCT per protocol  -- Hold PTA lantus 24 U daily and aspart 10 U BID while on insulin infusion protocol  --   Heme/Onc  No active issues  Plan:    DVT Prophylaxis: Pneumatic Compression Devices  GI Prophylaxis: H2 Blocker    Restraints: Restraints for medical healing needed: YES    Family update by me today: No    Plans for next 24 Hrs:  - wean sedation - allow to wake up   - SBT as able   - ID and  nephrology consulted  - FUP tac level, resume     Time Spent on this Encounter   Billing:  I spent 60 minutes bedside and on the inpatient unit today managing the critical care of Gem Jade in relation to the issues listed in this note.    Physical Exam   Temp: 97.8  F (36.6  C) Temp src: Axillary Temp  Min: 94.5  F (34.7  C)  Max: 98.1  F (36.7  C) BP: 174/66   Heart Rate: 67 Resp: 17 SpO2: 100 % O2 Device: Mechanical Ventilator Oxygen Delivery: 15 LPM  Vitals:    11/13/17 1615 11/13/17 2015 11/14/17 0500   Weight: 103 kg (227 lb) 103.1 kg (227 lb 4.7 oz) 103.5 kg (228 lb 2.8 oz)       Ventilation Mode: CMV/AC  FiO2 (%): 50 %  Rate Set (breaths/minute): 14 breaths/min  Tidal Volume Set (mL): 450 mL  PEEP (cm H2O): 5 cmH2O  Oxygen Concentration (%): 50 %  Resp: 17  I/O last 3 completed shifts:  In: 419.87 [I.V.:369.87; NG/GT:50]  Out: 1925 [Urine:1925]    GEN: Pt lying in bed, sedated, intubated, morbidly obese  HEENT: PEERL, sclerae are clear.  Normocephalic, atraumatic.  Oropharynx with moist mucous membranes.  Supple, no adenopathy.   CHEST: Intubated.  Clear lung sounds bilaterally posteriorly.  No crackles or wheezes noted.  CVS: Normal apical pulse, regular rate and rhythm.  Normal S1 and S2, no murmur/rub/gallop noted.  ABDO: Normal bowel sounds, soft, nondistended, nontender.  No masses palpated.  EXTREM: Radial and dorsalis pedis pulses palpable bilaterally.  Low extremities with +1 edema.    SKIN: Warm and dry, bilateral abdominal skin folds reddened, tegaderm on open area R abdominal fold.  NEURO: Sedated.   Unresponsive RASS -4    Lines: No erythema or discharge at entry site for No invasive lines  Current lines are required for patient management    Medications     NaCl 50 mL/hr at 11/14/17 0928     propofol (DIPRIVAN) infusion Stopped (11/14/17 0746)     IV fluid REPLACEMENT ONLY       insulin (regular) 2 Units/hr (11/14/17 0928)       hydrocortisone sodium succinate PF  50 mg Intravenous Q8H      chlorhexidine  15 mL Mouth/Throat Q12H     meropenem  500 mg Intravenous Q8H     vancomycin place smith - receiving intermittent dosing  1 each Does not apply See Admin Instructions     cinacalcet  30 mg Oral or NG Tube Daily     famotidine  20 mg Intravenous Q24H     labetalol  100 mg Oral BID       Data     Recent Labs  Lab 11/14/17  0905 11/14/17  0455 11/13/17  1613 11/13/17  1152 11/09/17  0735   WBC 7.5  --  6.0 8.2  --    HGB 11.3*  --  11.3* 10.3*  --    MCV 93  --  95 97  --      --  154 163 151     --  137  --  137   POTASSIUM 3.6  --  4.2  --  3.7   CHLORIDE 108  --  103  --  106   CO2 22  --  25  --  22   BUN 45*  --  47*  --  32*   CR 1.76* 1.76* 2.06*  --  1.52*   ANIONGAP 10  --  9  --  8   ROGER 8.0*  --  7.4*  --  8.1*   *  --  294*  --  122*   ALBUMIN  --   --  3.0*  --   --    PROTTOTAL  --   --  7.7  --   --    BILITOTAL  --   --  0.2  --   --    ALKPHOS  --   --  67  --   --    ALT  --   --  11  --   --    AST  --   --  8  --   --    TROPI  --   --  <0.015  --   --      Recent Results (from the past 24 hour(s))   XR Chest Port 1 View    Narrative    PORTABLE CHEST ONE VIEW  11/13/2017 4:29 PM     HISTORY: Shortness of breath and altered mental status.      COMPARISON: Chest x-ray 11/6/2017.      Impression    IMPRESSION: Portable view of the chest is performed in the supine  position. Lungs are clear but hypoaerated. Heart is enlarged but  unchanged. Aorta is calcified and tortuous. No obvious pneumothorax on  this supine view. No definite pleural effusions.    THEA MAJOR MD   CT Head w/o Contrast    Narrative    CT SCAN OF THE HEAD WITHOUT CONTRAST  11/13/2017  4:43 PM     HISTORY: Altered mental status and brandie-arrest.    TECHNIQUE: Axial images of the head and coronal reformations without  IV contrast material. Radiation dose for this scan was reduced using  automated exposure control, adjustment of the mA and/or kV according  to patient size, or iterative reconstruction  technique.    COMPARISON: 11/6/2017    FINDINGS: There is generalized atrophy of the brain. There is low  attenuation in the white matter of the cerebral hemispheres consistent  with sequelae of small vessel ischemic disease. There is no evidence  of intracranial hemorrhage, mass, acute infarct or anomaly.     The visualized portions of the sinuses and mastoids appear normal.  There is no evidence of trauma.       Impression    IMPRESSION:   1. No acute abnormality.  2. Atrophy of the brain. White matter changes consistent with sequelae  of small vessel ischemic disease. This is unchanged.    ELIZABETH GRIMM MD   Chest  XR, 1 view PORTABLE    Narrative    CHEST PORTABLE ONE VIEW    11/13/2017 7:45 PM     HISTORY: Post intubation.    COMPARISON: Chest x-ray 11/13/2017.      Impression    IMPRESSION:   1. ET tube newly identified, its tip above the abena. Nasogastric  tube is noted.  2. Stable cardiomegaly. Hypoinflated lungs again identified, stable.     GISSEL HARDIN MD

## 2017-11-14 NOTE — PROGRESS NOTES
"[facility]  Patient is currently open to home care services with [facility]. The patient is currently receiving Skilled nursing and HHA services. [facility]  and team have been notified of patient admission. [facility] liaison will continue to follow patient during stay. If appropriate provide orders to resume home care at time of discharge.    Addendum: RN Liaison spoke with [facility] clinical coordinator RN who notified of the following information/  Patient did not receive a home care RN visit on 11/13/17 prior to hospitalization. Last home care RN visit was on 11/10/17. [facility] clinical coordinator RN received a phone call from patient's spouse around 3/3:30 pm on 11/13/17 stating patient's catheter was \"leaky\" and requesting a nurse visit. When home care nurse called to notify patient of visit time, patient's spouse said \"I'm calling 911.\"   "

## 2017-11-14 NOTE — TELEPHONE ENCOUNTER
Spoke with SEKOU Vaughan Care Coordinator at [facility] per request of PCP.  Alexus has spoken to Dr Carlos but they are still looking into how to accommodate patient on stretcher.  She will speak with the  tomorrow.    Wendie Harris RN

## 2017-11-14 NOTE — PHARMACY-VANCOMYCIN DOSING SERVICE
Pharmacy Vancomycin Initial Note  Date of Service 2017  Patient's  1945  71 year old, female    Indication: Sepsis    Current estimated CrCl = Estimated Creatinine Clearance: 30.8 mL/min (based on Cr of 2.06).  Creatinine for last 3 days  2017:  4:13 PM Creatinine 2.06 mg/dL    Vancomycin IV Administrations (past 72 hours)                   vancomycin (VANCOCIN) 2,000 mg in NaCl 0.9 % 500 mL intermittent infusion (mg) 2,000 mg New Bag 17 1845                 Plan:  1.  Vancomycin 2000mg given today. No additional doses today  2.  Goal Trough Level: 15-20 mg/L   3.  Pharmacy will check trough levels as appropriate in 23 hours after last dose.    4. Serum creatinine levels will be ordered daily for the first week of therapy and at least twice weekly for subsequent weeks.    5. Hinkley method utilized to dose vancomycin therapy: mario Dowell

## 2017-11-14 NOTE — PROGRESS NOTES
Critical access hospital ICU RESPIRATORY NOTE  Date of Admission:11/13/2017  Date of Intubation (most recent):11/13/2017  Reason for Mechanical Ventilation:Respiratory distress  Number of Days on Mechanical Ventilation:2  Met Criteria for Pressure Support Trial:No  Length of Pressure Support Trial:None  Reason for Stopping Pressure Support Trial:  Reason for No Pressure Support Trial: per MD    Significant Events Today: vent setting changes, RR reduced to 14, VT reduced to 450, and FIO2 reduced to 50% this shift.    ABG Results:    Recent Labs  Lab 11/13/17  2334 11/13/17  1830 11/13/17  1731   PH 7.49* 7.25*  --    PCO2 26* 55*  --    PO2 252* 133*  --    HCO3 20* 24  --    O2PER 70  --  Canceled, Test credited     Ventilation Mode: CMV/AC  FiO2 (%): 50 %  Rate Set (breaths/minute): 14 breaths/min  Tidal Volume Set (mL): 450 mL  PEEP (cm H2O): 5 cmH2O  Oxygen Concentration (%): 50 %  Resp: 17    Plan:  Continue full vent support and assess for weaning trials in the AM.  11/14/2017  Kemi Guzman

## 2017-11-14 NOTE — PLAN OF CARE
Problem: Sepsis/Septic Shock (Adult)  Goal: Signs and Symptoms of Listed Potential Problems Will be Absent, Minimized or Managed (Sepsis/Septic Shock)  Signs and symptoms of listed potential problems will be absent, minimized or managed by discharge/transition of care (reference Sepsis/Septic Shock (Adult) CPG).   Outcome: No Change  Pt off sedation since 0900 this morning. Encephalopathic-opens eyes/coughs with stimulation, withdraws but does not follow commands. Failed wean in <2 minutes. Full vent support the rest of shift. ID and nephrology saw pt today. Maint IVF ordered. Insulin gtt Q2H. Will recheck TAC levels (immunosuppressant drug level) tomorrow morning. Updated , Chang, via phone. Questions answered and  Verbalizes understanding. MOnitor closely.

## 2017-11-14 NOTE — PROVIDER NOTIFICATION
MD Notification    Notified Person:  MD    Notified Persons Name: Dr. Velasquez    Notification Date/Time: 11/14 0630    Notification Interaction:  Talked with Physician    Purpose of Notification: Pt hypotensive.    Orders Received: Albumin and LR bolus.    Comments:

## 2017-11-14 NOTE — PHARMACY-ADMISSION MEDICATION HISTORY
Admission medication history interview status for the 11/13/2017  admission is complete. See EPIC admission navigator for prior to admission medications     Medication history source reliability:Good    Actions taken by pharmacist (provider contacted, etc):Called pt's  and went over the list over the phone     Additional medication history information not noted on PTA med list :None    Medication reconciliation/reorder completed by provider prior to medication history? No    Time spent in this activity: 15 minutes    Prior to Admission medications    Medication Sig Last Dose Taking? Auth Provider   diphenoxylate-atropine (LOMOTIL) 2.5-0.025 MG per tablet Take 1 tablet by mouth 4 times daily as needed for diarrhea prn Yes Brittani Ortiz MD   meclizine (ANTIVERT) 12.5 MG tablet Take 1 tablet (12.5 mg) by mouth 3 times daily as needed for dizziness prn Yes Brittani Ortiz MD   ciprofloxacin (CIPRO) 500 MG tablet Take 1 tablet (500 mg) by mouth every 12 hours for 7 days 11/12/2017 at pm Yes Brittani Ortiz MD   gabapentin (NEURONTIN) 300 MG capsule Take 3 capsules (900 mg) by mouth 2 times daily For pain 11/12/2017 at pm Yes Marivel Barcenas APRN CNP   tacrolimus (GENERIC EQUIVALENT) 1 MG capsule Take 3 capsules (3 mg) by mouth 2 times daily (total dose 3.5 mg twice daily) 11/12/2017 at pm Yes Ross Elizabeth MD   tacrolimus (GENERIC EQUIVALENT) 0.5 MG capsule Take 1 capsule (0.5 mg) by mouth 2 times daily (total dose 3.5 mg twice daily) 11/12/2017 at pm Yes Ross Elizabeth MD   cloNIDine (CATAPRES) 0.1 MG tablet TAKE ONE TABLET BY MOUTH TWICE A DAY 11/12/2017 at pm Yes Marivel Barcenas APRN CNP   Cholecalciferol (VITAMIN D) 2000 UNITS tablet Take 4,000 Units by mouth 2 times daily 11/12/2017 at Unknown time Yes    predniSONE (DELTASONE) 5 MG tablet Take 1 tablet (5 mg) by mouth daily 11/12/2017 at Unknown time Yes Marivel Barcenas APRN CNP   insulin  glargine (LANTUS SOLOSTAR) 100 UNIT/ML injection Inject 24 units under the skin daily. 11/12/2017 at pm Yes Marivel Barcenas APRN CNP   insulin aspart (NOVOLOG FLEXPEN) 100 UNIT/ML injection Inject 10 units under the skin 2 times daily before meals as directed. Add sliding scale as needed. Avg use 25 units per day 11/12/2017 at pm Yes Marivel Barcenas APRN CNP   lidocaine (XYLOCAINE) 2 % topical gel Apply topically as needed for moderate pain 10 ML every 3 weeks prn Yes Marivel Barcenas APRN CNP   labetalol (NORMODYNE) 300 MG tablet Take 150 mg by mouth 2 times daily Take 1/2 tablet (150mg) =300mg. Hold for systolic BP less than 120. 11/12/2017 at pm Yes    loratadine (CLARITIN) 10 MG tablet Take 1 tablet (10 mg) by mouth daily 11/12/2017 at Unknown time Yes Marivel Barcenas APRN CNP   pravastatin (PRAVACHOL) 10 MG tablet TAKE ONE TABLET BY MOUTH EVERY DAY 11/12/2017 at Unknown time Yes Marivel Barcenas APRN CNP   OXcarbazepine (TRILEPTAL) 300 MG tablet Take 1 tablet (300 mg) by mouth 2 times daily For pain 11/12/2017 at pm Yes    aspirin 81 MG chewable tablet Take 1 tablet (81 mg) by mouth daily Starting 1 week after your kidney biopsy 11/12/2017 at Unknown time Yes Christiano Webber MD   cinacalcet (SENSIPAR) 30 MG tablet Take 1 tablet (30 mg) by mouth daily 11/12/2017 at Unknown time Yes Christiano Webber MD   cyanocobalamin (VITAMIN B12) 1000 MCG/ML injection Inject 1 mL (1,000 mcg) into the muscle every 30 days unknown Yes Marivel Barcenas APRN CNP   TraMADol HCl 50 MG TBDP Take 50 mg by mouth 3 times daily as needed prn Yes Marivel Barcenas APRN CNP   levothyroxine (SYNTHROID) 50 MCG tablet Take 1 tablet (50 mcg) by mouth daily 11/12/2017 at Unknown time Yes Marivel Barcenas APRN CNP   ondansetron (ZOFRAN ODT) 4 MG disintegrating tablet Take 1-2 tablets (4-8 mg) by mouth every 8 hours as needed for nausea prn Yes  Marivel Barcenas APRN CNP   simethicone (MYLICON) 125 MG CHEW Take 1 tablet (125 mg) by mouth as needed for intestinal gas prn Yes    Lactobacillus Rhamnosus, GG, (CULTURELL) capsule Take 1 capsule by mouth 2 times daily 11/12/2017 at pm Yes She Siddiqui MD   Cranberry 400 MG TABS Take 400 mg by mouth 2 times daily 11/12/2017 at pm Yes Unknown, Entered By History   famotidine (PEPCID) 20 MG tablet Take 1 tablet (20 mg) by mouth 2 times daily 11/12/2017 at pm Yes Marivel Barcenas APRN CNP   ACETAMINOPHEN PO Take 325-650 mg by mouth every 4 hours as needed. prn Yes Unknown, Entered By History

## 2017-11-14 NOTE — PLAN OF CARE
Problem: Patient Care Overview  Goal: Plan of Care/Patient Progress Review  Outcome: No Change  Pt arrived from ED at 2015 last evening. BPs very elevated upon arrival on unit. Improved with 1-time dose of hydralazine and PTA labetolol. Remains on full vent support. Propofol for sedation, fentanyl for analgesia. Moderate amount of thick, clear secretions. Pt moves all extremities, withdraws from pain. Tele shows SR. Requiring insulin gtt. Receiving abx for UTI.  updated via telephone, verbalized understanding. Will continue to monitor.

## 2017-11-14 NOTE — PROVIDER NOTIFICATION
MD Notification    Notified Person:  MD    Notified Persons Name: Dr. Velasquez    Notification Date/Time: 11/14 0030    Notification Interaction:  Talked with Physician    Purpose of Notification: Pt now hypotensive; BPs 80's/40's.    Orders Received: IVF bolus    Comments:

## 2017-11-14 NOTE — CONSULTS
RENAL CONSULTATION NOTE    REFERRING MD:  Chris Bauman APRN CNP    REASON FOR CONSULTATION:  Renal transplant and ROB    HPI:  71 y.o woman with ESRD s/p LDKT (allograft baseline serum creatinine ~ 0.9-1.1 mg/dl), diabetes, hypertension and neurogenic bladder with recurrent UTIs, who was admitted for altered mental status. Urine culture on 11/6 grew citrobacter. She finished a course of Augmentin or Cipro four days PTA. Per report, patient gets confused with UTIs. She was intubated in the ER for airway protection due to AMS. She was started with meropenem and vancomycin.     HCT is negative for acute intracranial pathology    Baseline allograft serum creatinine 0.9-1.1 mg/dl. Scr has been high since early this year. 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 Her serum creatinine has been up and down. It was 1.16 mg/dl on 10/2 and 1.2 mg/dl on 11/6. Multiple acute kidney injuries.     TAC level was 8.3 at noon yesterday. Not sure is this is a 12 hours trough level. Goal TAC level is 4-6, more towards 6.     ROS:  A complete review of systems was performed and is negative except as noted above.    PMH:    Past Medical History:   Diagnosis Date     Background diabetic retinopathy(362.01) (H)     extensive diabetic retinopathy, s/p multiple laser treatments     Diabetic gastroparesis (H)     followed by [facility] (Dr. 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)      Urinary tract infection,  site not specified     Chronic UTIs     UTI (urinary tract infection) due to urinary indwelling catheter (H)        PSH:    Past Surgical History:   Procedure Laterality Date     C NONSPECIFIC PROCEDURE  10/99    kidney transplant     C NONSPECIFIC PROCEDURE  8/00    MRI head, lumbar spine, pelvis     C TOTAL KNEE ARTHROPLASTY  3/03    Knee Replacement, Total right, post op PE     CHOLECYSTECTOMY       HC LEFT HEART CATHETERIZATION  1999    Cardiac Cath, Left Heart, Negative  (@ [facility])     HC LEFT HEART CATHETERIZATION  4/05    normal coronary angiogram at [facility], had mild troponin rise (0.71)     HC REMV CATARACT EXTRACAP,INSERT LENS  8/04    bilateral cataract repaired, left eye        MEDICATIONS:      hydrocortisone sodium succinate PF  50 mg Intravenous Q8H     chlorhexidine  15 mL Mouth/Throat Q12H     meropenem  500 mg Intravenous Q8H     vancomycin place smith - receiving intermittent dosing  1 each Does not apply See Admin Instructions     cinacalcet  30 mg Oral or NG Tube Daily     famotidine  20 mg Intravenous Q24H     labetalol  100 mg Oral BID       ALLERGIES:    Allergies as of 11/13/2017 - Richie as Reviewed 11/13/2017   Allergen Reaction Noted     Bactrim ds [sulfamethoxazole w/trimethoprim]  08/22/2016     Atorvastatin calcium  09/10/2003     Codeine Nausea and Vomiting 08/12/2002     Darvocet [propoxyphene n-apap] Nausea and Vomiting 12/05/2011     Hydromorphone  08/09/2013     Levofloxacin Other (See Comments) and Diarrhea 09/14/2012     Morphine  07/19/2007     Niaspan [niacin] GI Disturbance 04/15/2008     Percocet [oxycodone-acetaminophen]  07/19/2007     Vicodin [hydrocodone-acetaminophen] Nausea and Vomiting 12/05/2011       FH:    Family History   Problem Relation Age of Onset     DIABETES Mother      DIABETES Brother      Hypertension Father      [id number] pneumonia     HEART DISEASE Father        SH:    Social History     Social History     Marital status:      Spouse name: N/A      "Number of children: N/A     Years of education: N/A     Occupational History     Not on file.     Social History Main Topics     Smoking status: Former Smoker     Years: 17.00     Quit date: 6/1/1987     Smokeless tobacco: Not on file     Alcohol use No     Drug use: No     Sexual activity: Yes     Partners: Male     Other Topics Concern     Not on file     Social History Narrative       PHYSICAL EXAM:    /71  Temp 97.8  F (36.6  C) (Axillary)  Resp 12  Ht 1.676 m (5' 5.98\")  Wt 103.5 kg (228 lb 2.8 oz)  SpO2 100%  BMI 36.85 kg/m2  GENERAL: Critically ill  HEENT:  Normocephalic. No gross abnormalities. Sclera anicteric. Intubated.  CV: RRR, no murmurs, no clicks, gallops, or rubs.  RESP: Clear bilaterally with good efforts. No wheezes or crackles.  GI: Abdomen obese, soft  MUSCULOSKELETAL: + charcot foot. Some dependent edema of the thigh and back.  SKIN: no suspicious lesions or rashes, dry to touch  NEURO:  Intubated and sedated. Withdraw to pain.  PSYCH: unable to evaluate  +New rico catheter    LABS:      CBC RESULTS:     Recent Labs  Lab 11/14/17  0905 11/13/17  1613 11/13/17  1152 11/09/17  0735 11/08/17  0539   WBC 7.5 6.0 8.2  --  6.2   RBC 3.69* 3.75* 3.40*  --  3.53*   HGB 11.3* 11.3* 10.3*  --  10.5*   HCT 34.4* 35.7 32.8*  --  34.5*    154 163 151 142*       BMP RESULTS:    Recent Labs  Lab 11/14/17  0905 11/14/17  0455 11/13/17  1613 11/09/17  0735 11/08/17  1950 11/08/17  0539     --  137 137  --  139   POTASSIUM 3.6  --  4.2 3.7 4.3 3.0*   CHLORIDE 108  --  103 106  --  106   CO2 22  --  25 22  --  23   BUN 45*  --  47* 32*  --  40*   CR 1.76* 1.76* 2.06* 1.52*  --  1.47*   *  --  294* 122*  --  180*   ROGER 8.0*  --  7.4* 8.1*  --  7.9*       INRNo lab results found in last 7 days.     DIAGNOSTICS:  Reviewed    A/P:  71 y.o woman with ESRD s/p LDKT and neurogenic bladder with recurrent UTIs, admitted for AMS presumed 2/2 another UTI.     # LDKT with allograft serum ~ " 0.9-1.1 mg/dl.   -biopsy in April with FSGS and 405 tubulo-interstitial and global scaring   -goal TAC level is 4-6   -resume tacrolimus at 3 mg bid-->pharamcist to convert to IV form   -currently on hydrocortisone and she was on prednisone 5 mg daily    # Neurogenic bladder with recurrent UTIs   -new rico   -on meropenem and vancomycin   -ID consult pending    # Hypertension. BP is high.    -looks like she was on Labetalol 150 mg bid    # AMS presumed due to UTI    #  IDDM    Plan  # Resume tacrolimus at 3 mg bid--> pharmacist can convert to IV formulation. Goal 12 hours trough is 4-6  # Can resume prednisone in next 1-2 days  # Change IVF to D5 1/2 NS at 75 cc/hr  # Can resume outpatient labetolol  # ID consult pending    Gaurav Carson MD  [facility]  Office Phone: [phone]  Pager: [phone]

## 2017-11-15 ENCOUNTER — APPOINTMENT (OUTPATIENT)
Dept: GENERAL RADIOLOGY | Facility: CLINIC | Age: 72
DRG: 698 | End: 2017-11-15
Attending: INTERNAL MEDICINE
Payer: MEDICARE

## 2017-11-15 LAB
ALBUMIN SERPL-MCNC: 2.8 G/DL (ref 3.4–5)
ALP SERPL-CCNC: 70 U/L (ref 40–150)
ALT SERPL W P-5'-P-CCNC: 11 U/L (ref 0–50)
ANION GAP SERPL CALCULATED.3IONS-SCNC: 10 MMOL/L (ref 3–14)
AST SERPL W P-5'-P-CCNC: 12 U/L (ref 0–45)
BACTERIA SPEC CULT: ABNORMAL
BASE DEFICIT BLDA-SCNC: 4.2 MMOL/L
BASE DEFICIT BLDA-SCNC: 4.3 MMOL/L
BASOPHILS # BLD AUTO: 0 10E9/L (ref 0–0.2)
BASOPHILS NFR BLD AUTO: 0.3 %
BILIRUB SERPL-MCNC: 0.3 MG/DL (ref 0.2–1.3)
BUN SERPL-MCNC: 44 MG/DL (ref 7–30)
CALCIUM SERPL-MCNC: 7.9 MG/DL (ref 8.5–10.1)
CHLORIDE SERPL-SCNC: 107 MMOL/L (ref 94–109)
CO2 SERPL-SCNC: 22 MMOL/L (ref 20–32)
CREAT SERPL-MCNC: 1.87 MG/DL (ref 0.52–1.04)
DIFFERENTIAL METHOD BLD: NORMAL
EOSINOPHIL # BLD AUTO: 0 10E9/L (ref 0–0.7)
EOSINOPHIL NFR BLD AUTO: 0.4 %
ERYTHROCYTE [DISTWIDTH] IN BLOOD BY AUTOMATED COUNT: 14.2 % (ref 10–15)
GFR SERPL CREATININE-BSD FRML MDRD: 27 ML/MIN/1.7M2
GLUCOSE BLDC GLUCOMTR-MCNC: 120 MG/DL (ref 70–99)
GLUCOSE BLDC GLUCOMTR-MCNC: 135 MG/DL (ref 70–99)
GLUCOSE BLDC GLUCOMTR-MCNC: 136 MG/DL (ref 70–99)
GLUCOSE BLDC GLUCOMTR-MCNC: 144 MG/DL (ref 70–99)
GLUCOSE BLDC GLUCOMTR-MCNC: 145 MG/DL (ref 70–99)
GLUCOSE BLDC GLUCOMTR-MCNC: 145 MG/DL (ref 70–99)
GLUCOSE BLDC GLUCOMTR-MCNC: 147 MG/DL (ref 70–99)
GLUCOSE BLDC GLUCOMTR-MCNC: 148 MG/DL (ref 70–99)
GLUCOSE SERPL-MCNC: 156 MG/DL (ref 70–99)
HCO3 BLD-SCNC: 21 MMOL/L (ref 21–28)
HCO3 BLD-SCNC: 21 MMOL/L (ref 21–28)
HCT VFR BLD AUTO: 37 % (ref 35–47)
HGB BLD-MCNC: 11.7 G/DL (ref 11.7–15.7)
IMM GRANULOCYTES # BLD: 0.1 10E9/L (ref 0–0.4)
IMM GRANULOCYTES NFR BLD: 1.1 %
LYMPHOCYTES # BLD AUTO: 1.3 10E9/L (ref 0.8–5.3)
LYMPHOCYTES NFR BLD AUTO: 18.4 %
Lab: ABNORMAL
MCH RBC QN AUTO: 29.9 PG (ref 26.5–33)
MCHC RBC AUTO-ENTMCNC: 31.6 G/DL (ref 31.5–36.5)
MCV RBC AUTO: 95 FL (ref 78–100)
MONOCYTES # BLD AUTO: 0.5 10E9/L (ref 0–1.3)
MONOCYTES NFR BLD AUTO: 6.4 %
NEUTROPHILS # BLD AUTO: 5.2 10E9/L (ref 1.6–8.3)
NEUTROPHILS NFR BLD AUTO: 73.4 %
NRBC # BLD AUTO: 0 10*3/UL
NRBC BLD AUTO-RTO: 0 /100
O2/TOTAL GAS SETTING VFR VENT: 40 %
O2/TOTAL GAS SETTING VFR VENT: ABNORMAL %
OXYHGB MFR BLD: 98 % (ref 92–100)
PCO2 BLD: 38 MM HG (ref 35–45)
PCO2 BLD: 39 MM HG (ref 35–45)
PH BLD: 7.34 PH (ref 7.35–7.45)
PH BLD: 7.35 PH (ref 7.35–7.45)
PLATELET # BLD AUTO: 154 10E9/L (ref 150–450)
PO2 BLD: 134 MM HG (ref 80–105)
PO2 BLD: 180 MM HG (ref 80–105)
POTASSIUM SERPL-SCNC: 3.8 MMOL/L (ref 3.4–5.3)
PROCALCITONIN SERPL-MCNC: 0.08 NG/ML
PROT SERPL-MCNC: 7.4 G/DL (ref 6.8–8.8)
RBC # BLD AUTO: 3.91 10E12/L (ref 3.8–5.2)
SODIUM SERPL-SCNC: 139 MMOL/L (ref 133–144)
SPECIMEN SOURCE: ABNORMAL
TACROLIMUS BLD-MCNC: 5.3 UG/L (ref 5–15)
TME LAST DOSE: NORMAL H
WBC # BLD AUTO: 7.1 10E9/L (ref 4–11)

## 2017-11-15 PROCEDURE — 25000132 ZZH RX MED GY IP 250 OP 250 PS 637: Mod: GY | Performed by: INTERNAL MEDICINE

## 2017-11-15 PROCEDURE — 71010 XR CHEST PORT 1 VW: CPT

## 2017-11-15 PROCEDURE — 80053 COMPREHEN METABOLIC PANEL: CPT | Performed by: INTERNAL MEDICINE

## 2017-11-15 PROCEDURE — 25000128 H RX IP 250 OP 636: Performed by: INTERNAL MEDICINE

## 2017-11-15 PROCEDURE — 20000003 ZZH R&B ICU

## 2017-11-15 PROCEDURE — 40000275 ZZH STATISTIC RCP TIME EA 10 MIN

## 2017-11-15 PROCEDURE — 25000128 H RX IP 250 OP 636: Performed by: SURGERY

## 2017-11-15 PROCEDURE — 84145 PROCALCITONIN (PCT): CPT | Performed by: INTERNAL MEDICINE

## 2017-11-15 PROCEDURE — 25000132 ZZH RX MED GY IP 250 OP 250 PS 637: Mod: GY | Performed by: SURGERY

## 2017-11-15 PROCEDURE — 85025 COMPLETE CBC W/AUTO DIFF WBC: CPT | Performed by: INTERNAL MEDICINE

## 2017-11-15 PROCEDURE — 00000146 ZZHCL STATISTIC GLUCOSE BY METER IP

## 2017-11-15 PROCEDURE — 27210995 ZZH RX 272: Performed by: NURSE PRACTITIONER

## 2017-11-15 PROCEDURE — 36415 COLL VENOUS BLD VENIPUNCTURE: CPT | Performed by: INTERNAL MEDICINE

## 2017-11-15 PROCEDURE — 99291 CRITICAL CARE FIRST HOUR: CPT | Performed by: INTERNAL MEDICINE

## 2017-11-15 PROCEDURE — A9270 NON-COVERED ITEM OR SERVICE: HCPCS | Mod: GY | Performed by: SURGERY

## 2017-11-15 PROCEDURE — 82803 BLOOD GASES ANY COMBINATION: CPT | Performed by: INTERNAL MEDICINE

## 2017-11-15 PROCEDURE — 36600 WITHDRAWAL OF ARTERIAL BLOOD: CPT

## 2017-11-15 PROCEDURE — 25000131 ZZH RX MED GY IP 250 OP 636 PS 637: Mod: GY | Performed by: SURGERY

## 2017-11-15 PROCEDURE — 82805 BLOOD GASES W/O2 SATURATION: CPT | Performed by: INTERNAL MEDICINE

## 2017-11-15 PROCEDURE — 94003 VENT MGMT INPAT SUBQ DAY: CPT

## 2017-11-15 PROCEDURE — 40000008 ZZH STATISTIC AIRWAY CARE

## 2017-11-15 PROCEDURE — A9270 NON-COVERED ITEM OR SERVICE: HCPCS | Mod: GY | Performed by: INTERNAL MEDICINE

## 2017-11-15 RX ORDER — HEPARIN SODIUM 5000 [USP'U]/.5ML
5000 INJECTION, SOLUTION INTRAVENOUS; SUBCUTANEOUS EVERY 8 HOURS
Status: DISCONTINUED | OUTPATIENT
Start: 2017-11-15 | End: 2017-11-19 | Stop reason: HOSPADM

## 2017-11-15 RX ORDER — PREDNISONE 5 MG/1
5 TABLET ORAL DAILY
Status: DISCONTINUED | OUTPATIENT
Start: 2017-11-16 | End: 2017-11-19 | Stop reason: HOSPADM

## 2017-11-15 RX ORDER — FLUCONAZOLE 2 MG/ML
200 INJECTION, SOLUTION INTRAVENOUS EVERY 24 HOURS
Status: DISCONTINUED | OUTPATIENT
Start: 2017-11-15 | End: 2017-11-17

## 2017-11-15 RX ADMIN — MEROPENEM AND SODIUM CHLORIDE 500 MG: 500 INJECTION, SOLUTION INTRAVENOUS at 01:36

## 2017-11-15 RX ADMIN — CLONIDINE HYDROCHLORIDE 0.1 MG: 0.1 TABLET ORAL at 21:24

## 2017-11-15 RX ADMIN — FLUCONAZOLE 200 MG: 2 INJECTION INTRAVENOUS at 12:50

## 2017-11-15 RX ADMIN — CINACALCET HYDROCHLORIDE 30 MG: 30 TABLET, COATED ORAL at 09:51

## 2017-11-15 RX ADMIN — MEROPENEM AND SODIUM CHLORIDE 500 MG: 500 INJECTION, SOLUTION INTRAVENOUS at 17:45

## 2017-11-15 RX ADMIN — LABETALOL HCL 100 MG: 100 TABLET, FILM COATED ORAL at 21:24

## 2017-11-15 RX ADMIN — ASPIRIN 81 MG 81 MG: 81 TABLET ORAL at 09:51

## 2017-11-15 RX ADMIN — CHLORHEXIDINE GLUCONATE 15 ML: 1.2 RINSE ORAL at 10:11

## 2017-11-15 RX ADMIN — SODIUM CHLORIDE 0.5 UNITS/HR: 9 INJECTION, SOLUTION INTRAVENOUS at 11:58

## 2017-11-15 RX ADMIN — LEVOTHYROXINE SODIUM 50 MCG: 50 TABLET ORAL at 09:51

## 2017-11-15 RX ADMIN — CHLORHEXIDINE GLUCONATE 15 ML: 1.2 RINSE ORAL at 21:25

## 2017-11-15 RX ADMIN — SODIUM CHLORIDE: 4.5 INJECTION, SOLUTION INTRAVENOUS at 17:56

## 2017-11-15 RX ADMIN — VITAMIN D, TAB 1000IU (100/BT) 4000 UNITS: 25 TAB at 21:24

## 2017-11-15 RX ADMIN — LABETALOL HCL 100 MG: 100 TABLET, FILM COATED ORAL at 09:51

## 2017-11-15 RX ADMIN — CLONIDINE HYDROCHLORIDE 0.1 MG: 0.1 TABLET ORAL at 09:51

## 2017-11-15 RX ADMIN — HYDRALAZINE HYDROCHLORIDE 10 MG: 20 INJECTION INTRAMUSCULAR; INTRAVENOUS at 17:43

## 2017-11-15 RX ADMIN — HYDRALAZINE HYDROCHLORIDE 10 MG: 20 INJECTION INTRAMUSCULAR; INTRAVENOUS at 11:02

## 2017-11-15 RX ADMIN — HEPARIN SODIUM 5000 UNITS: 5000 INJECTION, SOLUTION INTRAVENOUS; SUBCUTANEOUS at 14:28

## 2017-11-15 RX ADMIN — MEROPENEM AND SODIUM CHLORIDE 500 MG: 500 INJECTION, SOLUTION INTRAVENOUS at 09:49

## 2017-11-15 RX ADMIN — SODIUM CHLORIDE: 4.5 INJECTION, SOLUTION INTRAVENOUS at 05:09

## 2017-11-15 RX ADMIN — HYDROCORTISONE SODIUM SUCCINATE 50 MG: 100 INJECTION, POWDER, FOR SOLUTION INTRAMUSCULAR; INTRAVENOUS at 09:46

## 2017-11-15 RX ADMIN — HEPARIN SODIUM 5000 UNITS: 5000 INJECTION, SOLUTION INTRAVENOUS; SUBCUTANEOUS at 21:24

## 2017-11-15 RX ADMIN — VITAMIN D, TAB 1000IU (100/BT) 4000 UNITS: 25 TAB at 09:51

## 2017-11-15 NOTE — PROGRESS NOTES
Renal Medicine Progress Note            Assessment/Plan:     71 y.o woman with ESRD s/p LDKT and neurogenic bladder with recurrent UTIs, admitted for AMS presumed 2/2 another UTI.      # LDKT with allograft serum ~ 0.9-1.1 mg/dl. Scr has been up and down. Scr was 1.2 mg/dl on 11/7.                          -biopsy in April with FSGS and 40% tubulo-interstitial and global scaring                         -goal TAC level is 4-6                         -resume tacrolimus at 3 mg bid po bid when able to take oral-->on IV dosing down                         -restart prednisone 5 mg daily tomorrow      # Neurogenic bladder with recurrent UTIs                         -new rico              -UC grew candida                         -on meropenem                          -ID is following     # Hypertension. BP is high.                          -resumed home BP meds     # AMS presumed due to UTI. She is waking up and following verbal commands     #  IDDM. On insulin gtt     Plan  # Resume Tacrolimus 3 mg bid po when she is able to take oral. On IV formulation for now.  # Resume prednisone 5 mg daily tomorrow  # Stop hydrocortisone  # Check FENa  # Cont IVF  # Hold Sensipar 2/2 as calcium level is on the low side  # Likely will be extubated today          Interval History:     Patient opens eyes and follows some commands but very weak. No new issues report by RN.          Medications and Allergies:       aspirin  81 mg Oral Daily     cholecalciferol  4,000 Units Oral BID     cloNIDine  0.1 mg Oral BID     levothyroxine  50 mcg Oral Daily     hydrocortisone sodium succinate PF  50 mg Intravenous BID 09 12     chlorhexidine  15 mL Mouth/Throat Q12H     meropenem  500 mg Intravenous Q8H     cinacalcet  30 mg Oral or NG Tube Daily     famotidine  20 mg Intravenous Q24H     labetalol  100 mg Oral BID        Allergies   Allergen Reactions     Bactrim Ds [Sulfamethoxazole W/Trimethoprim]      Creatinine level increased      "Atorvastatin Calcium      myalgias, muscle aches     Codeine Nausea and Vomiting     Darvocet [Propoxyphene N-Apap] Nausea and Vomiting     Hydromorphone      Levofloxacin Other (See Comments) and Diarrhea     hallucinations     Morphine      Nausea and vomiting     Niaspan [Niacin] GI Disturbance     Flushing even at low dose, GI upset     Percocet [Oxycodone-Acetaminophen]      Vomiting after taking med couple of times     Vicodin [Hydrocodone-Acetaminophen] Nausea and Vomiting            Physical Exam:   Vitals were reviewed  Heart Rate: 74, Blood pressure 171/66, temperature 99.1  F (37.3  C), resp. rate 11, height 1.676 m (5' 5.98\"), weight 102.9 kg (226 lb 13.7 oz), SpO2 100 %, not currently breastfeeding.    Wt Readings from Last 3 Encounters:   11/15/17 102.9 kg (226 lb 13.7 oz)   11/08/17 103.5 kg (228 lb 2.8 oz)   04/27/17 90.5 kg (199 lb 8 oz)       Intake/Output Summary (Last 24 hours) at 11/15/17 1120  Last data filed at 11/15/17 0600   Gross per 24 hour   Intake          1375.36 ml   Output              445 ml   Net           930.36 ml       GENERAL APPEARANCE: NAD  HEENT:  Open eyes  RESP: lungs cta b c good efforts, no crackles, rhonchi or wheezes  CV: RRR, nl S1/S2, no m/r/g   ABDOMEN: obese, soft.  EXTREMITIES/SKIN: no rashes/lesions on observed skin; some dependent pitting edema of the back and thighs  Neuro: eyes are open, she follows some verbal commands           Data:     CBC RESULTS:     Recent Labs  Lab 11/15/17  0505 11/14/17  0905 11/13/17  1613 11/13/17  1152 11/09/17  0735   WBC 7.1 7.5 6.0 8.2  --    RBC 3.91 3.69* 3.75* 3.40*  --    HGB 11.7 11.3* 11.3* 10.3*  --    HCT 37.0 34.4* 35.7 32.8*  --     152 154 163 151       Basic Metabolic Panel:    Recent Labs  Lab 11/15/17  0505 11/14/17  0905 11/14/17  0455 11/13/17  1613 11/13/17  1152 11/09/17  0735 11/08/17  1950    140  --  137 136 137  --    POTASSIUM 3.8 3.6  --  4.2 4.3 3.7 4.3   CHLORIDE 107 108  --  103 104 106  " --    CO2 22 22  --  25 20 22  --    BUN 44* 45*  --  47* 52* 32*  --    CR 1.87* 1.76* 1.76* 2.06* 2.02* 1.52*  --    * 136*  --  294* 305* 122*  --    ROGER 7.9* 8.0*  --  7.4* 7.5* 8.1*  --        INRNo lab results found in last 7 days.   Attestation:   I have reviewed today's relevant vital signs, notes, medications, labs and imaging.    Gaurav Carson MD  [facility]  Office phone :[phone]  Pager: [phone]

## 2017-11-15 NOTE — PROGRESS NOTES
Patient extubated to cool aerosol mask 10L 40% at 13:30. Pt alert and follows command. Suctioned for white and thin secretions. Vitals RR 14, HR 75, /69 and SpO2 100%. BBS diminished. No stridor. Will continue to follow.

## 2017-11-15 NOTE — PLAN OF CARE
Problem: Patient Care Overview  Goal: Plan of Care/Patient Progress Review  Outcome: Improving  Pt now more awake, following commands. Tele SR. O2 sats 100% on vent, coughing occasionally against tube, minimal secretions. Insulin gtt on/off throughout night. Low UOP still throughout night approx 50/2hr, no intervention per tele. Plan for SBT again today. Spouse updated over the phone.

## 2017-11-15 NOTE — PROGRESS NOTES
[facility] ICU RESPIRATORY NOTE  Date of Admission: 11/13/2017  Date of Intubation (most recent): 11/13/2017  Reason for Mechanical Ventilation: Respiratory distress  Number of Days on Mechanical Ventilation: 2  Met Criteria for Pressure Support Trial: no  Reason for No Pressure Support Trial: per MD     Significant Events Today: none    Ventilation Mode: CMV/AC  FiO2 (%): 40 %  Rate Set (breaths/minute): 12 breaths/min  Tidal Volume Set (mL): 450 mL  PEEP (cm H2O): 5 cmH2O  Oxygen Concentration (%): 40 %  Resp: 12      Recent Labs  Lab 11/14/17  0512 11/13/17  2334 11/13/17  1830 11/13/17  1731   PH 7.38 7.49* 7.25*  --    PCO2 37 26* 55*  --    PO2 207* 252* 133*  --    HCO3 22 20* 24  --    O2PER 50 70  --  Canceled, Test credited     ETT appearance on chest x-ray: In good position     Plan:  Continue full ventilator support overnight. Wean as tolerated.    Leoncio ELLIS

## 2017-11-15 NOTE — PROGRESS NOTES
[facility]    Intensive Care Progress Note    Date of Service (when I saw the patient): 11/15/2017    Hospital day #1  ICU day #1    PROBLEM LIST:  1. Acute respiratory failure 2/2  2. Likely urosepsis 2/2 chronic indwelling rico catheter  3. Encephalopathy  4. Immunocompromised status    Past 24 Hrs:  - no major events, weaned sedation  - SBT this AM  - adjusting immunosuppression per Renal  - ID added diflucan - given candida in urine        Assessment & Plan   Gem Jade is a 71 year old female with PMH s/p kidney transplant, neurogenic bladder with chronic indwelling catheter and recurrent UTIs who was admitted on 11/13/2017 for altered mental status and acute respiratory failure.      Neurology  1. Altered mental status 2/2 septic encephalopathy due to urosepsis. Improved  Plan:  -- minimize sedation  --     Cardiovascular  1. Hypertension  2. Hyperlipidemia  Plan:  -- Continue with scheduled labetolol, on clonidine and labetolol PTA  -- PRN hydralazine  -- Hold PTA pravastain  Restart when appropriate    Pulmonary  1. Acute respiratory failure 2/2 urosepsis and septic encephalopathy  Plan:  -- Ventilator management, assess to wean to extubate today  -- PST today as mental status allows    Renal/Fluids/Electrolytes  1. History of renal transplant on immunosuppressant (tacrolimus) and chronic steroids (prednisone 5 mg daily), baseline creatinine 1.0-2.0  2. Likely urosepsis 2/2 chronic indwelling catheter due to neurogenic bladder  3. Chronic urinary tract infections  Plan:  -- Nephrology consult, appreciate recommendations  -- check prograf level today- IV prograf per Renal   -- discontinue hydrocortisone IV resume home prednisone   -- Restart cinacalcet when appropriate   -- Chronic indwelling catheter changed upon admission  -- Transplant coordinator, Porsha Silva ([phone] M-W, [phone] Th-F), updated    ID   1. Urosepsis 2/2 chronic indwelling catheter due to neurogenic bladder  - U/A noted small leukocyte esterase and WBC >100, UC with candida, otherwise pending.  Had recent UTI due to citrobacter; history of citrobacter/klebsiella/ESBL  2. History of chronic urinary tract infections (klebsiella, citrobacter, ESBL) 2/2 chronic indwelling catheter due to neurogenic bladder   Plan:  -- Infectious disease following - t, appreciate recommendations  -- Discontinued vancomycin   -- continue meropenem and diflucan  -- Chronic indwelling catheter changed upon admission    GI  No active issues  Plan:  NPO while intubated - place NG if unable to extubate otherwise swallow eval    Nutrition  No active issues; morbidly obese, BMI 36.85    Plan:  NPO while intubated    Endocrine  1. Hypothyroidism  2. Type II diabetes mellitus with complications, diabetic retinopathy and neuropathy;(A1C 7.7) chronic pain, -- Hold PTA lantus 24 U daily and aspart 10 U BID   3. History of renal transplant on immunosuppressant (tacrolimus) and chronic steroids (prednisone 5 mg daily), baseline creatinine 1.0-2.0  Plan:  -- discontinue hydrocortisone IV stress dose   -- ICU insulin protocol   -- holding PO levothyroxine     Heme/Onc  No active issues  Plan:    DVT Prophylaxis: Pneumatic Compression Devices  GI Prophylaxis: H2 Blocker    Restraints: Restraints for medical healing needed: YES    Family update by me today: No    Plans for next 24 Hrs:  - SBT  This AM, check ABG  - extubate if meets criteria  - continue IV tac  - under renal guidance   - FUP cultures  - dc hydrocortisone - restart prednisone 5         Time Spent on this Encounter   Total CC time spent thus far: 45    Physical Exam   Temp: 98.1  F (36.7  C) Temp src: Bladder Temp  Min: 98.1  F (36.7  C)  Max: 99.3  F (37.4  C) BP: 133/53   Heart Rate: 67 Resp: 14 SpO2: 100 % O2 Device: Mechanical Ventilator    Vitals:    11/13/17 2015 11/14/17 0500 11/15/17 0600   Weight: 103.1 kg (227 lb 4.7 oz) 103.5 kg (228 lb 2.8 oz) 102.9 kg (226 lb 13.7 oz)        Ventilation Mode: CPAP/PS  FiO2 (%): 40 %  Rate Set (breaths/minute): 12 breaths/min  Tidal Volume Set (mL): 450 mL  PEEP (cm H2O): 5 cmH2O  Pressure Support (cm H2O): 5 cmH2O  Oxygen Concentration (%): 40 %  Resp: 14  I/O last 3 completed shifts:  In: 1653.04 [I.V.:1483.04; NG/GT:120]  Out: 535 [Urine:535]    GEN: Pt lying in bed, sedate, intubated, morbidly obese  HEENT: PEERL, sclerae are clear.  Normocephalic, atraumatic.  Oropharynx with moist mucous membranes.  Supple, no adenopathy.   CHEST: Intubated.  Clear lung sounds bilaterally. Synchronous with vent.  No crackles or wheezes noted.  CVS: Normal apical pulse, regular rate and rhythm.  Normal S1 and S2, no murmur/rub/gallop noted.  ABDO: Normal bowel sounds, soft, nondistended, nontender.  No masses palpated.  EXTREM: Radial and dorsalis pedis pulses palpable bilaterally.  Low extremities with +1 edema.    SKIN: Warm and dry, bilateral abdominal skin folds reddened, tegaderm on open area R abdominal fold.  NEURO: Sedate.  RASS 0, following commands moving all 4 extremities   Lines: No erythema or discharge at entry site for No invasive lines  Current lines are required for patient management    Medications     NaCl 75 mL/hr at 11/15/17 1017     tacrolimus (Prograf) infusion ADULT 83.3 mcg/hr (11/15/17 1017)     propofol (DIPRIVAN) infusion Stopped (11/14/17 0746)     IV fluid REPLACEMENT ONLY       insulin (regular) 0.5 Units/hr (11/15/17 1158)       fluconazole  200 mg Intravenous Q24H     [START ON 11/16/2017] predniSONE  5 mg Oral Daily     aspirin  81 mg Oral Daily     cholecalciferol  4,000 Units Oral BID     cloNIDine  0.1 mg Oral BID     levothyroxine  50 mcg Oral Daily     chlorhexidine  15 mL Mouth/Throat Q12H     meropenem  500 mg Intravenous Q8H     famotidine  20 mg Intravenous Q24H     labetalol  100 mg Oral BID       Data     Recent Labs  Lab 11/15/17  0505 11/14/17  0905 11/14/17  0455 11/13/17  1613   WBC 7.1 7.5  --  6.0   HGB 11.7  11.3*  --  11.3*   MCV 95 93  --  95    152  --  154    140  --  137   POTASSIUM 3.8 3.6  --  4.2   CHLORIDE 107 108  --  103   CO2 22 22  --  25   BUN 44* 45*  --  47*   CR 1.87* 1.76* 1.76* 2.06*   ANIONGAP 10 10  --  9   ROGER 7.9* 8.0*  --  7.4*   * 136*  --  294*   ALBUMIN 2.8*  --   --  3.0*   PROTTOTAL 7.4  --   --  7.7   BILITOTAL 0.3  --   --  0.2   ALKPHOS 70  --   --  67   ALT 11  --   --  11   AST 12  --   --  8   TROPI  --   --   --  <0.015     No results found for this or any previous visit (from the past 24 hour(s)).

## 2017-11-15 NOTE — CONSULTS
INFECTIOUS DISEASE CONSULTATION       REQUESTING PHYSICIAN:  Robin Velasquez MD      IMPRESSION:   1.  A 71-year-old female well known to us from prior admissions, admitted with mental status changes, respiratory failure and probable sepsis, by history most likely urosepsis, although not entirely clear.   2.  Acute respiratory failure, not clear pneumonia, probably related to sepsis.   3.  Recent urinary tract infection, apparently has been on Cipro.   4.  Recurrent urinary tract infections, chronic Johnson catheter in place, has had vancomycin-resistant enterococcus and extended spectrum beta lactamase in the past.   5.  Diabetes mellitus.   6.  Prior renal transplant, on chronic immunosuppressant therapy without major immunosuppressing infections, renal function slightly abnormal, but stable.   7.  Sulfa allergy and listed quinolone allergy, apparently just got quinolone and does not have a true quinolone allergy, but has had reproducible neurologic side-effects in the past.  I think we successfully re-challenged her with Cipro at one time in the past and apparently again now.       RECOMMENDATIONS:   1.  Await full culture information and follow closely.   2.  For now meropenem.   3.  The patient received a dose of vancomycin with no clear indication, stop relatively soon if no clear need.      HISTORY OF PRESENT ILLNESS:  The patient Gem Jade is currently intubated in the Intensive Care Unit, and history  is obtained from the records.  She is well known to us previously.  She has a history of a prior successful renal transplant and has had several episodes of recurrent sepsis that have generally been urinary tract in origin.  She unfortunately has required a chronic Johnson catheter and with that has had chronic colonization and recurrent infections.  These have included extended spectrum beta lactamase organisms and vancomycin-resistant enterococcus.  She has had some true urosepsis and bacteremias, but not  recently.  She currently is admitted with acute mental status changes without obvious specific localizing symptoms and without clear-cut major sepsis symptoms, she developed a significant respiratory component and is intubated.  She does have grossly abnormal urine with a possible urinary tract infection as the source.  She apparently was on Cipro at home for a recent diagnosis of a urinary tract infection.  Work-up has revealed no other obvious neurologic event or clear-cut pneumonia.      PAST MEDICAL HISTORY:     1.  Multiple prior episodes similar to the current episode.   2.  Diabetes.   3.  Renal transplant.   4.  Chronic polyneuropathy.   5.  Chronic urinary catheter.      ALLERGIES:     1.  Sulfa.   2.  Quinolones are listed, but has had reproducible neurologic side-effects with that, apparently successfully re-challenged with Cipro prior to admission,, although I suppose this could conceivably be part of the current picture.      REVIEW OF SYSTEMS:  Unobtainable.      SOCIAL HISTORY:  Otherwise unobtainable, but no recent exposures per the records.  The patient has not been around anyone who is ill.      FAMILY HISTORY:  Unobtainable.      PHYSICAL EXAMINATION:   GENERAL:  The patient looks comfortable, is lying in the Intensive Care Unit bed intubated and sedated.   VITAL SIGNS:  Afebrile, pulse 90, blood pressure stable, if not somewhat high.   HEENT:  No visible lesions, no facial skin rashes.   NECK:  Supple and nontender without lymphadenopathy or thyromegaly.   HEART:  Rate of 90, no significant murmur.   LUNGS:  Clear bilaterally with a few rhonchi at the right base.   ABDOMEN:  Soft and nontender.   EXTREMITIES:  No obvious embolic lesions, no significant joint abnormalities.      LABORATORY DATA:     1.  Urine grossly abnormal which is the case most of the time.   2.  White blood count in the normal range without a left shift.   3.  All cultures pending.     4.  Procalcitonin 0.08.      Thank you  for this consultation. We will continue to follow the patient with you.         CÉSAR ELDER MD             D: 11/15/2017 08:14   T: 11/15/2017 08:57   MT: [id number]      Name:     MARYJANE SHAVER   MRN:      [id number]        Account:       [id number]   :      1945           Consult Date:  2017      Document: [id number]       cc: Marivel CRUZ, CNP

## 2017-11-15 NOTE — PROGRESS NOTES
[facility] ICU RESPIRATORY NOTE  Date of Admission: 11/13/2017  Date of Intubation (most recent): 11/13/2017  Reason for Mechanical Ventilation: Respiratory distress  Number of Days on Mechanical Ventilation: 3  Met Criteria for Pressure Support Trial: no  Reason for No Pressure Support Trial: per MD      Significant Events Today: none    Recent Labs  Lab 11/14/17  0512 11/13/17  2334 11/13/17  1830 11/13/17  1731   PH 7.38 7.49* 7.25*  --    PCO2 37 26* 55*  --    PO2 207* 252* 133*  --    HCO3 22 20* 24  --    O2PER 50 70  --  Canceled, Test credited     Ventilation Mode: CMV/AC  FiO2 (%): 40 %  Rate Set (breaths/minute): 12 breaths/min  Tidal Volume Set (mL): 450 mL  PEEP (cm H2O): 5 cmH2O  Oxygen Concentration (%): 40 %  Resp: 13    ETT appearance on chest x-ray: In good position      Plan:  Continue full ventilator support overnight. Wean as tolerated.    11/15/2017  Kemi Guzman

## 2017-11-15 NOTE — PROGRESS NOTES
[facility]  Infectious Disease Progress Note          Assessment and Plan:   IMPRESSION:   1.  A 71-year-old female well known to us from prior admissions, admitted with mental status changes, respiratory failure and probable sepsis, by history most likely urosepsis, although not entirely clear.   2.  Acute respiratory failure, not clear pneumonia, probably related to sepsis.   3.  Recent urinary tract infection, apparently has been on Cipro.   4.  Recurrent urinary tract infections, chronic Johnson catheter in place, has had vancomycin-resistant enterococcus and extended spectrum beta lactamase in the past.   5.  Diabetes mellitus.   6.  Prior renal transplant, on chronic immunosuppressant therapy without major immunosuppressing infections, renal function slightly abnormal, but stable.   7.  Sulfa allergy and listed quinolone allergy, apparently just got quinolone and does not have a true quinolone allergy, but has had reproducible neurologic side-effects in the past.  I think we successfully re-challenged her with Cipro at one time in the past and apparently again now.        RECOMMENDATIONS:   1.  Await full culture information and follow closely. procal low, no fever, WBC nl, ? No infection  2.  For now meropenem. Doubt candiduria significant but short course flucon for now  3.  The patient received a dose of vancomycin with no clear indication, stop as no clear need and renal insuff.         Interval History:   Intubated and sedated and doing well; no cp, sob, n/v/d, or abd pain. cx only candida in cath UC, recent + UC fairly R citrobacter  BC neg procal low WBC nl              Medications:       fluconazole  200 mg Intravenous Q24H     aspirin  81 mg Oral Daily     cholecalciferol  4,000 Units Oral BID     cloNIDine  0.1 mg Oral BID     levothyroxine  50 mcg Oral Daily     hydrocortisone sodium succinate PF  50 mg Intravenous BID 09 12     chlorhexidine  15 mL Mouth/Throat Q12H      "meropenem  500 mg Intravenous Q8H     cinacalcet  30 mg Oral or NG Tube Daily     famotidine  20 mg Intravenous Q24H     labetalol  100 mg Oral BID                  Physical Exam:   Blood pressure 171/66, temperature 99.1  F (37.3  C), resp. rate 11, height 1.676 m (5' 5.98\"), weight 102.9 kg (226 lb 13.7 oz), SpO2 100 %, not currently breastfeeding.  Wt Readings from Last 2 Encounters:   11/15/17 102.9 kg (226 lb 13.7 oz)   11/08/17 103.5 kg (228 lb 2.8 oz)     Vital Signs with Ranges  Temp:  [98.4  F (36.9  C)-99.3  F (37.4  C)] 99.1  F (37.3  C)  Heart Rate:  [61-80] 74  Resp:  [11-23] 11  BP: (104-179)/(41-90) 171/66  FiO2 (%):  [40 %] 40 %  SpO2:  [100 %] 100 %    Constitutional: inubated, no apparent distress   Lungs: Clear to auscultation bilaterally, no crackles or wheezing   Cardiovascular: Regular rate and rhythm, normal S1 and S2, and no murmur noted   Abdomen: Normal bowel sounds, soft, non-distended, non-tender   Skin: No rashes, no cyanosis,some edema   Other:           Data:   All microbiology laboratory data reviewed.  Recent Labs   Lab Test  11/15/17   0505  11/14/17   0905  11/13/17   1613   WBC  7.1  7.5  6.0   HGB  11.7  11.3*  11.3*   HCT  37.0  34.4*  35.7   MCV  95  93  95   PLT  154  152  154     Recent Labs   Lab Test  11/15/17   0505  11/14/17   0905  11/14/17   0455   CR  1.87*  1.76*  1.76*     Recent Labs   Lab Test  05/25/16   2116   SED  53*     Recent Labs   Lab Test  11/13/17   1714  11/13/17   1646  11/13/17   1613  11/06/17   1802  11/06/17   1720  11/06/17   1652  10/27/17   1425  10/16/17   1445  07/11/17   1400   CULT  No growth after 2 days  >100,000 colonies/mL  Candida albicans / dubliniensis  Candida albicans and Candida dubliniensis are not routinely speciated  Susceptibility testing not routinely done  *  No growth after 2 days  >100,000 colonies/mL  Citrobacter freundii complex  *  No growth  No growth  50,000 to 100,000 colonies/mL  Candida albicans / " dubliniensis  Candida albicans and Candida dubliniensis are not routinely speciated  *  >100,000 colonies/mL  Escherichia coli  *  >100,000 colonies/mL  Klebsiella pneumoniae  *  <10,000 colonies/mL  Strain 2  Klebsiella pneumoniae  *  10,000 to 50,000 colonies/mL Enterococcus faecalis  <10,000 colonies/mL Staphylococcus lugdunensis  *

## 2017-11-16 VITALS — SYSTOLIC BLOOD PRESSURE: 140 MMHG | DIASTOLIC BLOOD PRESSURE: 88 MMHG | HEART RATE: 62 BPM | OXYGEN SATURATION: 94 %

## 2017-11-16 LAB
ALBUMIN SERPL-MCNC: 2.7 G/DL (ref 3.4–5)
ALP SERPL-CCNC: 64 U/L (ref 40–150)
ALT SERPL W P-5'-P-CCNC: 11 U/L (ref 0–50)
ANION GAP SERPL CALCULATED.3IONS-SCNC: 9 MMOL/L (ref 3–14)
AST SERPL W P-5'-P-CCNC: 13 U/L (ref 0–45)
BASOPHILS # BLD AUTO: 0 10E9/L (ref 0–0.2)
BASOPHILS NFR BLD AUTO: 0.3 %
BILIRUB SERPL-MCNC: 0.3 MG/DL (ref 0.2–1.3)
BUN SERPL-MCNC: 43 MG/DL (ref 7–30)
CALCIUM SERPL-MCNC: 7.7 MG/DL (ref 8.5–10.1)
CHLORIDE SERPL-SCNC: 107 MMOL/L (ref 94–109)
CO2 SERPL-SCNC: 22 MMOL/L (ref 20–32)
CREAT SERPL-MCNC: 1.79 MG/DL (ref 0.52–1.04)
CREAT UR-MCNC: 43 MG/DL
DIFFERENTIAL METHOD BLD: ABNORMAL
EOSINOPHIL # BLD AUTO: 0.1 10E9/L (ref 0–0.7)
EOSINOPHIL NFR BLD AUTO: 1.6 %
ERYTHROCYTE [DISTWIDTH] IN BLOOD BY AUTOMATED COUNT: 14.2 % (ref 10–15)
FRACT EXCRET NA UR+SERPL-RTO: 1.1 %
GFR SERPL CREATININE-BSD FRML MDRD: 28 ML/MIN/1.7M2
GLUCOSE BLDC GLUCOMTR-MCNC: 102 MG/DL (ref 70–99)
GLUCOSE BLDC GLUCOMTR-MCNC: 107 MG/DL (ref 70–99)
GLUCOSE BLDC GLUCOMTR-MCNC: 109 MG/DL (ref 70–99)
GLUCOSE BLDC GLUCOMTR-MCNC: 110 MG/DL (ref 70–99)
GLUCOSE BLDC GLUCOMTR-MCNC: 115 MG/DL (ref 70–99)
GLUCOSE BLDC GLUCOMTR-MCNC: 154 MG/DL (ref 70–99)
GLUCOSE BLDC GLUCOMTR-MCNC: 97 MG/DL (ref 70–99)
GLUCOSE SERPL-MCNC: 109 MG/DL (ref 70–99)
HCT VFR BLD AUTO: 33.5 % (ref 35–47)
HGB BLD-MCNC: 10.9 G/DL (ref 11.7–15.7)
IMM GRANULOCYTES # BLD: 0.1 10E9/L (ref 0–0.4)
IMM GRANULOCYTES NFR BLD: 0.7 %
LYMPHOCYTES # BLD AUTO: 2 10E9/L (ref 0.8–5.3)
LYMPHOCYTES NFR BLD AUTO: 27.1 %
MCH RBC QN AUTO: 30.2 PG (ref 26.5–33)
MCHC RBC AUTO-ENTMCNC: 32.5 G/DL (ref 31.5–36.5)
MCV RBC AUTO: 93 FL (ref 78–100)
MONOCYTES # BLD AUTO: 0.8 10E9/L (ref 0–1.3)
MONOCYTES NFR BLD AUTO: 10.2 %
NEUTROPHILS # BLD AUTO: 4.5 10E9/L (ref 1.6–8.3)
NEUTROPHILS NFR BLD AUTO: 60.1 %
NRBC # BLD AUTO: 0 10*3/UL
NRBC BLD AUTO-RTO: 0 /100
PLATELET # BLD AUTO: 164 10E9/L (ref 150–450)
POTASSIUM SERPL-SCNC: 3.3 MMOL/L (ref 3.4–5.3)
PROT SERPL-MCNC: 7 G/DL (ref 6.8–8.8)
RBC # BLD AUTO: 3.61 10E12/L (ref 3.8–5.2)
SODIUM SERPL-SCNC: 138 MMOL/L (ref 133–144)
SODIUM UR-SCNC: 36 MMOL/L
TACROLIMUS BLD-MCNC: 17.9 UG/L (ref 5–15)
TME LAST DOSE: ABNORMAL H
WBC # BLD AUTO: 7.4 10E9/L (ref 4–11)

## 2017-11-16 PROCEDURE — 25000132 ZZH RX MED GY IP 250 OP 250 PS 637: Mod: GY | Performed by: INTERNAL MEDICINE

## 2017-11-16 PROCEDURE — 40000275 ZZH STATISTIC RCP TIME EA 10 MIN

## 2017-11-16 PROCEDURE — 40000894 ZZH STATISTIC OT IP EVAL DEFER

## 2017-11-16 PROCEDURE — 12000000 ZZH R&B MED SURG/OB

## 2017-11-16 PROCEDURE — 00000146 ZZHCL STATISTIC GLUCOSE BY METER IP

## 2017-11-16 PROCEDURE — 25000131 ZZH RX MED GY IP 250 OP 636 PS 637: Mod: GY | Performed by: INTERNAL MEDICINE

## 2017-11-16 PROCEDURE — 25000128 H RX IP 250 OP 636: Performed by: SURGERY

## 2017-11-16 PROCEDURE — 94660 CPAP INITIATION&MGMT: CPT

## 2017-11-16 PROCEDURE — 99233 SBSQ HOSP IP/OBS HIGH 50: CPT | Performed by: INTERNAL MEDICINE

## 2017-11-16 PROCEDURE — A9270 NON-COVERED ITEM OR SERVICE: HCPCS | Mod: GY | Performed by: NURSE PRACTITIONER

## 2017-11-16 PROCEDURE — 80053 COMPREHEN METABOLIC PANEL: CPT | Performed by: INTERNAL MEDICINE

## 2017-11-16 PROCEDURE — A9270 NON-COVERED ITEM OR SERVICE: HCPCS | Mod: GY | Performed by: INTERNAL MEDICINE

## 2017-11-16 PROCEDURE — 25000132 ZZH RX MED GY IP 250 OP 250 PS 637: Mod: GY | Performed by: NURSE PRACTITIONER

## 2017-11-16 PROCEDURE — 25000128 H RX IP 250 OP 636: Performed by: INTERNAL MEDICINE

## 2017-11-16 PROCEDURE — 40000893 ZZH STATISTIC PT IP EVAL DEFER

## 2017-11-16 PROCEDURE — S0028 INJECTION, FAMOTIDINE, 20 MG: HCPCS | Performed by: SURGERY

## 2017-11-16 PROCEDURE — 85025 COMPLETE CBC W/AUTO DIFF WBC: CPT | Performed by: INTERNAL MEDICINE

## 2017-11-16 PROCEDURE — 25000125 ZZHC RX 250: Performed by: INTERNAL MEDICINE

## 2017-11-16 PROCEDURE — 99207 ZZC CDG-CODE CATEGORY CHANGED: CPT | Performed by: INTERNAL MEDICINE

## 2017-11-16 PROCEDURE — 80197 ASSAY OF TACROLIMUS: CPT | Performed by: INTERNAL MEDICINE

## 2017-11-16 PROCEDURE — 25000132 ZZH RX MED GY IP 250 OP 250 PS 637: Mod: GY | Performed by: SURGERY

## 2017-11-16 PROCEDURE — 36415 COLL VENOUS BLD VENIPUNCTURE: CPT | Performed by: INTERNAL MEDICINE

## 2017-11-16 PROCEDURE — 84300 ASSAY OF URINE SODIUM: CPT | Performed by: INTERNAL MEDICINE

## 2017-11-16 PROCEDURE — 82570 ASSAY OF URINE CREATININE: CPT | Performed by: INTERNAL MEDICINE

## 2017-11-16 PROCEDURE — 27210995 ZZH RX 272: Performed by: NURSE PRACTITIONER

## 2017-11-16 PROCEDURE — A9270 NON-COVERED ITEM OR SERVICE: HCPCS | Mod: GY | Performed by: SURGERY

## 2017-11-16 PROCEDURE — 25000125 ZZHC RX 250: Performed by: SURGERY

## 2017-11-16 RX ORDER — POLYETHYLENE GLYCOL 3350 17 G/17G
17 POWDER, FOR SOLUTION ORAL DAILY PRN
Status: DISCONTINUED | OUTPATIENT
Start: 2017-11-16 | End: 2017-11-19 | Stop reason: HOSPADM

## 2017-11-16 RX ORDER — ONDANSETRON 2 MG/ML
4 INJECTION INTRAMUSCULAR; INTRAVENOUS EVERY 6 HOURS PRN
Status: DISCONTINUED | OUTPATIENT
Start: 2017-11-16 | End: 2017-11-17

## 2017-11-16 RX ORDER — CINACALCET 30 MG/1
30 TABLET, FILM COATED ORAL DAILY
Status: DISCONTINUED | OUTPATIENT
Start: 2017-11-16 | End: 2017-11-17

## 2017-11-16 RX ORDER — ACETAMINOPHEN 325 MG/1
650 TABLET ORAL EVERY 4 HOURS PRN
Status: DISCONTINUED | OUTPATIENT
Start: 2017-11-16 | End: 2017-11-19 | Stop reason: HOSPADM

## 2017-11-16 RX ORDER — LABETALOL 100 MG/1
100 TABLET, FILM COATED ORAL 2 TIMES DAILY
Status: DISCONTINUED | OUTPATIENT
Start: 2017-11-16 | End: 2017-11-19 | Stop reason: HOSPADM

## 2017-11-16 RX ORDER — NICOTINE POLACRILEX 4 MG
15-30 LOZENGE BUCCAL
Status: DISCONTINUED | OUTPATIENT
Start: 2017-11-16 | End: 2017-11-19 | Stop reason: HOSPADM

## 2017-11-16 RX ORDER — POTASSIUM CHLORIDE 1500 MG/1
40 TABLET, EXTENDED RELEASE ORAL ONCE
Status: COMPLETED | OUTPATIENT
Start: 2017-11-16 | End: 2017-11-16

## 2017-11-16 RX ORDER — OXCARBAZEPINE 300 MG/1
300 TABLET, FILM COATED ORAL 2 TIMES DAILY
Status: DISCONTINUED | OUTPATIENT
Start: 2017-11-16 | End: 2017-11-19 | Stop reason: HOSPADM

## 2017-11-16 RX ORDER — GABAPENTIN 300 MG/1
900 CAPSULE ORAL 2 TIMES DAILY
Status: DISCONTINUED | OUTPATIENT
Start: 2017-11-16 | End: 2017-11-19 | Stop reason: HOSPADM

## 2017-11-16 RX ORDER — FAMOTIDINE 20 MG/1
20 TABLET, FILM COATED ORAL 2 TIMES DAILY
Status: DISCONTINUED | OUTPATIENT
Start: 2017-11-16 | End: 2017-11-17 | Stop reason: DRUGHIGH

## 2017-11-16 RX ORDER — POTASSIUM CHLORIDE 1500 MG/1
40 TABLET, EXTENDED RELEASE ORAL ONCE
Status: DISCONTINUED | OUTPATIENT
Start: 2017-11-16 | End: 2017-11-16

## 2017-11-16 RX ORDER — AMOXICILLIN 250 MG
1 CAPSULE ORAL 2 TIMES DAILY PRN
Status: DISCONTINUED | OUTPATIENT
Start: 2017-11-16 | End: 2017-11-19 | Stop reason: HOSPADM

## 2017-11-16 RX ORDER — ONDANSETRON 4 MG/1
4 TABLET, ORALLY DISINTEGRATING ORAL EVERY 6 HOURS PRN
Status: DISCONTINUED | OUTPATIENT
Start: 2017-11-16 | End: 2017-11-17

## 2017-11-16 RX ORDER — DEXTROSE MONOHYDRATE 25 G/50ML
25-50 INJECTION, SOLUTION INTRAVENOUS
Status: DISCONTINUED | OUTPATIENT
Start: 2017-11-16 | End: 2017-11-19 | Stop reason: HOSPADM

## 2017-11-16 RX ORDER — AMOXICILLIN 250 MG
2 CAPSULE ORAL 2 TIMES DAILY PRN
Status: DISCONTINUED | OUTPATIENT
Start: 2017-11-16 | End: 2017-11-19 | Stop reason: HOSPADM

## 2017-11-16 RX ORDER — BISACODYL 10 MG
10 SUPPOSITORY, RECTAL RECTAL DAILY PRN
Status: DISCONTINUED | OUTPATIENT
Start: 2017-11-16 | End: 2017-11-19 | Stop reason: HOSPADM

## 2017-11-16 RX ADMIN — SODIUM CHLORIDE: 4.5 INJECTION, SOLUTION INTRAVENOUS at 09:00

## 2017-11-16 RX ADMIN — FAMOTIDINE 20 MG: 10 INJECTION, SOLUTION INTRAVENOUS at 01:19

## 2017-11-16 RX ADMIN — FAMOTIDINE 20 MG: 20 TABLET, FILM COATED ORAL at 19:38

## 2017-11-16 RX ADMIN — HEPARIN SODIUM 5000 UNITS: 5000 INJECTION, SOLUTION INTRAVENOUS; SUBCUTANEOUS at 04:43

## 2017-11-16 RX ADMIN — PREDNISONE 5 MG: 5 TABLET ORAL at 10:36

## 2017-11-16 RX ADMIN — MEROPENEM AND SODIUM CHLORIDE 500 MG: 500 INJECTION, SOLUTION INTRAVENOUS at 04:05

## 2017-11-16 RX ADMIN — ASPIRIN 81 MG 81 MG: 81 TABLET ORAL at 10:38

## 2017-11-16 RX ADMIN — CLONIDINE HYDROCHLORIDE 0.1 MG: 0.1 TABLET ORAL at 10:36

## 2017-11-16 RX ADMIN — POTASSIUM CHLORIDE 20 MEQ: 1500 TABLET, EXTENDED RELEASE ORAL at 14:54

## 2017-11-16 RX ADMIN — VITAMIN D, TAB 1000IU (100/BT) 4000 UNITS: 25 TAB at 10:38

## 2017-11-16 RX ADMIN — FLUCONAZOLE 200 MG: 2 INJECTION INTRAVENOUS at 13:18

## 2017-11-16 RX ADMIN — LEVOTHYROXINE SODIUM 50 MCG: 50 TABLET ORAL at 10:38

## 2017-11-16 RX ADMIN — GABAPENTIN 900 MG: 300 CAPSULE ORAL at 13:17

## 2017-11-16 RX ADMIN — HEPARIN SODIUM 5000 UNITS: 5000 INJECTION, SOLUTION INTRAVENOUS; SUBCUTANEOUS at 20:50

## 2017-11-16 RX ADMIN — HYDRALAZINE HYDROCHLORIDE 10 MG: 20 INJECTION INTRAMUSCULAR; INTRAVENOUS at 21:06

## 2017-11-16 RX ADMIN — LABETALOL HCL 100 MG: 100 TABLET, FILM COATED ORAL at 10:32

## 2017-11-16 RX ADMIN — INSULIN GLARGINE 12 UNITS: 100 INJECTION, SOLUTION SUBCUTANEOUS at 22:11

## 2017-11-16 RX ADMIN — OXCARBAZEPINE 300 MG: 300 TABLET, FILM COATED ORAL at 14:44

## 2017-11-16 RX ADMIN — MEROPENEM AND SODIUM CHLORIDE 500 MG: 500 INJECTION, SOLUTION INTRAVENOUS at 12:02

## 2017-11-16 RX ADMIN — MEROPENEM AND SODIUM CHLORIDE 500 MG: 500 INJECTION, SOLUTION INTRAVENOUS at 19:32

## 2017-11-16 ASSESSMENT — ACTIVITIES OF DAILY LIVING (ADL)
RETIRED_EATING: 4-->COMPLETELY DEPENDENT
TRANSFERRING: 4-->COMPLETELY DEPENDENT
COGNITION: 1 - ATTENTION OR MEMORY DEFICITS
BATHING: 4-->COMPLETELY DEPENDENT
AMBULATION: 4-->COMPLETELY DEPENDENT
TOILETING: 4-->COMPLETELY DEPENDENT
RETIRED_COMMUNICATION: 3-->UNABLE TO SPEAK (NOT RELATED TO LANGUAGE BARRIER)
SWALLOWING: 2-->DIFFICULTY SWALLOWING LIQUIDS/FOODS
DRESS: 4-->COMPLETELY DEPENDENT
FALL_HISTORY_WITHIN_LAST_SIX_MONTHS: NO

## 2017-11-16 NOTE — PLAN OF CARE
Problem: Patient Care Overview  Goal: Plan of Care/Patient Progress Review  OT: Order received, chart reviewed. Per chart and discussion with PT, pt lives in house with  where she receives A from  for all ADLs. Pt use lift for transfers at baseline. Pt demonstrates no skilled OT needs during hospitalization; thus, discontinuation of OT services.

## 2017-11-16 NOTE — PLAN OF CARE
Problem: Patient Care Overview  Goal: Plan of Care/Patient Progress Review  Outcome: No Change  Pt oriented x3 overnight. She is experiencing problems with vertigo- states she feels she is on a merry go round and it feels like the ceiling is moving. Tearful and anxious overnight. Maintained spo2 >92% on room air overnight. Hemodynamically stable. Tolerating po intake with clear liquid diet. Blood sugars controlled- insulin drip from 0 - 0.5 units/hr overnight.  updated with plan of care.

## 2017-11-16 NOTE — PROGRESS NOTES
"0900  Nurse entered room to care for pt and pt states she does not want this nurse to care for her because she was \"behind the curtain.\"  Report and care passed to nurse who cared for her yesterday.  "

## 2017-11-16 NOTE — PROGRESS NOTES
Renal Medicine Progress Note            Assessment/Plan:     71 y.o woman with ESRD s/p LDKT and neurogenic bladder with recurrent UTIs, admitted for AMS presumed 2/2 another UTI.       # LDKT with allograft serum ~ 0.9-1.1 mg/dl. Scr has been up and down. Scr was 1.2 mg/dl on 11/7.                          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 On K protocol.      # Hypertension. BP is controlled.                         -resumed home BP meds      # AMS 2/2 infection. Improved.      #  IDDM. On insulin gtt        Plan  # Resume Tacrolimus 3 mg bid po when she is able to take oral. On IV formulation for now.        Interval History:     Patient was successfully extubated. She looks comfortable on room air. Afebrile. Kidney function is stable.           Medications and Allergies:       insulin glargine  12 Units Subcutaneous At Bedtime     insulin aspart  1-7 Units Subcutaneous TID AC     insulin aspart  1-5 Units Subcutaneous At Bedtime     fluconazole  200 mg Intravenous Q24H     predniSONE  5 mg Oral Daily     heparin  5,000 Units Subcutaneous Q8H     aspirin  81 mg Oral Daily     cholecalciferol  4,000 Units Oral BID     cloNIDine  0.1 mg Oral BID     levothyroxine  50 mcg Oral Daily     meropenem  500 mg Intravenous Q8H     famotidine  20 mg Intravenous Q24H     labetalol  100 mg Oral BID        Allergies   Allergen Reactions     Bactrim Ds [Sulfamethoxazole W/Trimethoprim]      Creatinine level increased     Atorvastatin Calcium       "myalgias, muscle aches     Codeine Nausea and Vomiting     Darvocet [Propoxyphene N-Apap] Nausea and Vomiting     Hydromorphone      Levofloxacin Other (See Comments) and Diarrhea     hallucinations     Morphine      Nausea and vomiting     Niaspan [Niacin] GI Disturbance     Flushing even at low dose, GI upset     Percocet [Oxycodone-Acetaminophen]      Vomiting after taking med couple of times     Vicodin [Hydrocodone-Acetaminophen] Nausea and Vomiting            Physical Exam:   Vitals were reviewed  Heart Rate: 69, Blood pressure 142/56, temperature 97.7  F (36.5  C), resp. rate 8, height 1.676 m (5' 5.98\"), weight 101.8 kg (224 lb 6.9 oz), SpO2 98 %, not currently breastfeeding.    Wt Readings from Last 3 Encounters:   11/16/17 101.8 kg (224 lb 6.9 oz)   11/08/17 103.5 kg (228 lb 2.8 oz)   04/27/17 90.5 kg (199 lb 8 oz)       Intake/Output Summary (Last 24 hours) at 11/16/17 0915  Last data filed at 11/16/17 0600   Gross per 24 hour   Intake          1645.53 ml   Output              526 ml   Net          1119.53 ml     GENERAL APPEARANCE: NAD  HEENT:  EOMI. OMM.  RESP: lungs cta b c good efforts, no crackles, rhonchi or wheezes  CV: RRR, nl S1/S2, no m/r/g   ABDOMEN: obese, soft.  EXTREMITIES/SKIN: no rashes/lesions on observed skin; some dependent pitting edema of the back and thighs  Neuro: Awake. She answers some questions, a bit slow.         Data:     CBC RESULTS:     Recent Labs  Lab 11/16/17  0525 11/15/17  0505 11/14/17  0905 11/13/17  1613 11/13/17  1152   WBC 7.4 7.1 7.5 6.0 8.2   RBC 3.61* 3.91 3.69* 3.75* 3.40*   HGB 10.9* 11.7 11.3* 11.3* 10.3*   HCT 33.5* 37.0 34.4* 35.7 32.8*    154 152 154 163       Basic Metabolic Panel:    Recent Labs  Lab 11/16/17  0525 11/15/17  0505 11/14/17  0905 11/14/17  0455 11/13/17  1613 11/13/17  1152    139 140  --  137 136   POTASSIUM 3.3* 3.8 3.6  --  4.2 4.3   CHLORIDE 107 107 108  --  103 104   CO2 22 22 22  --  25 20   BUN 43* 44* 45*  --  47* 52* "   CR 1.79* 1.87* 1.76* 1.76* 2.06* 2.02*   * 156* 136*  --  294* 305*   ROGER 7.7* 7.9* 8.0*  --  7.4* 7.5*       INRNo lab results found in last 7 days.   Attestation:   I have reviewed today's relevant vital signs, notes, medications, labs and imaging.    Gaurav Carson MD  [facility] - Nephrology  Office phone :[phone]  Pager: [phone]

## 2017-11-16 NOTE — PROGRESS NOTES
[facility]    Intensive Care Progress Note    Date of Service (when I saw the patient): 11/16/2017    Hospital day #2  ICU day #2    PROBLEM LIST:  1. Acute respiratory failure 2/2  2. Likely urosepsis 2/2 chronic indwelling rico catheter  3. Encephalopathy  4. Immunocompromised status    Past 24 Hrs:  - extubated 11/16   - no major events,  Mental status improved - but still confused  - started PO ice chips       Assessment & Plan   Gem Jade is a 71 year old female with PMH s/p kidney transplant, neurogenic bladder with chronic indwelling catheter and recurrent UTIs who was admitted on 11/13/2017 for altered mental status and acute respiratory failure.      Neurology  1. Altered mental status 2/2 septic encephalopathy due to urosepsis. Improved. Still a bit confused +/- delirious  Plan:  -- minimize sedation  --     Cardiovascular  1. Hypertension  2. Hyperlipidemia  Plan:  -- Continue with scheduled labetolol, on clonidine and labetolol PTA  -- PRN hydralazine  -- Hold PTA pravastain  Restart when appropriate    Pulmonary  1. Acute respiratory failure 2/2 urosepsis and septic encephalopathy - resolved ; extubated 11/15  Plan:  - supplemental O2  - aspiration precautions    Renal/Fluids/Electrolytes  1. History of renal transplant on immunosuppressant (tacrolimus) and chronic steroids (prednisone 5 mg daily), baseline creatinine 1.0-2.0  2. Likely urosepsis 2/2 chronic indwelling catheter due to neurogenic bladder  3. Chronic urinary tract infections  Plan:  -- Nephrology consulted, appreciate recommendations  -- repeat prograf level pending today- IV prograf per Renal   -- restarted home prednisone   -- Restart cinacalcet when appropriate   -- Chronic indwelling catheter changed upon admission  -- Transplant coordinator, Porsha Silva ([phone] M-W, [phone] Th-F), updated    ID   1. Urosepsis 2/2 chronic indwelling catheter due to neurogenic bladder - U/A noted small leukocyte  esterase and WBC >100, UC with candida, otherwise pending.  Had recent UTI due to citrobacter; history of citrobacter/klebsiella/ESBL  2. History of chronic urinary tract infections (klebsiella, citrobacter, ESBL) 2/2 chronic indwelling catheter due to neurogenic bladder   Plan:  -- Infectious disease following - appreciate recommendations  -- Discontinued vancomycin   -- continue meropenem and diflucan - FUP cultures  -- Chronic indwelling catheter changed upon admission    GI  No active issues  Plan:  - advance PO, swallow eval    Nutrition  No active issues; morbidly obese, BMI 36.85  Plan:  Advance PO   Aspiration precautions    Endocrine  1. Hypothyroidism  2. Type II diabetes mellitus with complications, diabetic retinopathy and neuropathy;(A1C 7.7) chronic pain, -- Hold PTA lantus 24 U daily and aspart 10 U BID   3. History of renal transplant on immunosuppressant (tacrolimus) and chronic steroids (prednisone 5 mg daily), baseline creatinine 1.0-2.0  Plan:  -- prednisone 5 mg  -- ICU insulin protocol - will dc insulin drip - start lantus 12 (1/1 home dose) with SS coverage  -- restart  PO levothyroxine     Heme/Onc  No active issues  Plan:  - follow    DVT Prophylaxis: Pneumatic Compression Devices  GI Prophylaxis: H2 Blocker    Restraints: Restraints for medical healing needed: YES    Family update by me today: Yes     Plans for next 24 Hrs:  - PT/OT   - adjust insulin  - transfer to floor      Time Spent on this Encounter   Total CC time spent thus far: 45    Physical Exam   Temp: 97.2  F (36.2  C) Temp src: Bladder Temp  Min: 95.9  F (35.5  C)  Max: 98.6  F (37  C) BP: 148/60   Heart Rate: 71 Resp: 10 SpO2: 99 % O2 Device: None (Room air)    Vitals:    11/14/17 0500 11/15/17 0600 11/16/17 0400   Weight: 103.5 kg (228 lb 2.8 oz) 102.9 kg (226 lb 13.7 oz) 101.8 kg (224 lb 6.9 oz)       Ventilation Mode: CPAP/PS  FiO2 (%): 40 %  Rate Set (breaths/minute): 12 breaths/min  Tidal Volume Set (mL): 450 mL  PEEP  (cm H2O): 5 cmH2O  Pressure Support (cm H2O): 5 cmH2O  Oxygen Concentration (%): 40 %  Resp: 10  I/O last 3 completed shifts:  In: 1909.85 [I.V.:1909.85]  Out: 526 [Urine:526]    GEN: Pt lying in bed, no acute distress  HEENT: PEERL, sclerae are clear.  Normocephalic, atraumatic.  Oropharynx with moist mucous membranes.  Supple, no adenopathy.   CHEST: Unlabored respirations Clear lung sounds bilaterally.  No crackles or wheezes noted.  CVS: Normal apical pulse, regular rate and rhythm.  Normal S1 and S2, no murmur/rub/gallop noted.  ABDO: Normal bowel sounds, soft, nondistended, nontender.  No masses palpated.  EXTREM: Radial and dorsalis pedis pulses palpable bilaterally.  Low extremities with +1 edema.    SKIN: Warm and dry, bilateral abdominal skin folds reddened, tegaderm on open area R abdominal fold.  NEURO: alert oriented to person, place - mild confusion otherwise. Following commands moving all 4 extremities   Lines: No erythema or discharge at entry site for No invasive lines  Current lines are required for patient management    Medications     NaCl 75 mL/hr at 11/15/17 1956     tacrolimus (Prograf) infusion ADULT 83.3 mcg/hr (11/15/17 1956)     propofol (DIPRIVAN) infusion Stopped (11/14/17 0746)     IV fluid REPLACEMENT ONLY       insulin (regular) 0.2 Units/hr (11/16/17 0745)       fluconazole  200 mg Intravenous Q24H     predniSONE  5 mg Oral Daily     heparin  5,000 Units Subcutaneous Q8H     aspirin  81 mg Oral Daily     cholecalciferol  4,000 Units Oral BID     cloNIDine  0.1 mg Oral BID     levothyroxine  50 mcg Oral Daily     chlorhexidine  15 mL Mouth/Throat Q12H     meropenem  500 mg Intravenous Q8H     famotidine  20 mg Intravenous Q24H     labetalol  100 mg Oral BID       Data     Recent Labs  Lab 11/16/17  0525 11/15/17  0505 11/14/17  0905  11/13/17  1613   WBC 7.4 7.1 7.5  --  6.0   HGB 10.9* 11.7 11.3*  --  11.3*   MCV 93 95 93  --  95    154 152  --  154    139 140  --  137    POTASSIUM 3.3* 3.8 3.6  --  4.2   CHLORIDE 107 107 108  --  103   CO2 22 22 22  --  25   BUN 43* 44* 45*  --  47*   CR 1.79* 1.87* 1.76*  < > 2.06*   ANIONGAP 9 10 10  --  9   ROGER 7.7* 7.9* 8.0*  --  7.4*   * 156* 136*  --  294*   ALBUMIN 2.7* 2.8*  --   --  3.0*   PROTTOTAL 7.0 7.4  --   --  7.7   BILITOTAL 0.3 0.3  --   --  0.2   ALKPHOS 64 70  --   --  67   ALT 11 11  --   --  11   AST 13 12  --   --  8   TROPI  --   --   --   --  <0.015   < > = values in this interval not displayed.  No results found for this or any previous visit (from the past 24 hour(s)).       L3

## 2017-11-16 NOTE — PROGRESS NOTES
[facility]  Infectious Disease Progress Note          Assessment and Plan:   IMPRESSION:   1.  A 71-year-old female well known to us from prior admissions, admitted with mental status changes, respiratory failure and probable sepsis, by history most likely urosepsis, although not entirely clear.   2.  Acute respiratory failure, not clear pneumonia, probably related to sepsis.   3.  Recent urinary tract infection, apparently has been on Cipro.   4.  Recurrent urinary tract infections, chronic Johnson catheter in place, has had vancomycin-resistant enterococcus and extended spectrum beta lactamase in the past.   5.  Diabetes mellitus.   6.  Prior renal transplant, on chronic immunosuppressant therapy without major immunosuppressing infections, renal function slightly abnormal, but stable.   7.  Sulfa allergy and listed quinolone allergy, apparently just got quinolone and does not have a true quinolone allergy, but has had reproducible neurologic side-effects in the past.  I think we successfully re-challenged her with Cipro at one time in the past and apparently again now.        RECOMMENDATIONS:   1.  Await full culture information and follow closely. procal low, no fever, WBC nl, ? No infection  2.  For now meropenem. Doubt candiduria significant but short course flucon in case  3.  The patient received a dose of vancomycin with no clear indication, stop as no clear need and renal insuff.   4 Improved and extubated, not at baseline mental status        Interval History:   extubated and doing better; no cp, sob, n/v/d, or abd pain. cx only candida in cath UC, recent + UC fairly R citrobacter  BC neg procal low WBC nl  Mental status not at baseline(pt known to me)              Medications:       fluconazole  200 mg Intravenous Q24H     predniSONE  5 mg Oral Daily     heparin  5,000 Units Subcutaneous Q8H     aspirin  81 mg Oral Daily     cholecalciferol  4,000 Units Oral BID     cloNIDine  0.1 mg  "Oral BID     levothyroxine  50 mcg Oral Daily     chlorhexidine  15 mL Mouth/Throat Q12H     meropenem  500 mg Intravenous Q8H     famotidine  20 mg Intravenous Q24H     labetalol  100 mg Oral BID                  Physical Exam:   Blood pressure 148/60, temperature 97.2  F (36.2  C), resp. rate 10, height 1.676 m (5' 5.98\"), weight 101.8 kg (224 lb 6.9 oz), SpO2 99 %, not currently breastfeeding.  Wt Readings from Last 2 Encounters:   11/16/17 101.8 kg (224 lb 6.9 oz)   11/08/17 103.5 kg (228 lb 2.8 oz)     Vital Signs with Ranges  Temp:  [95.9  F (35.5  C)-98.6  F (37  C)] 97.2  F (36.2  C)  Pulse:  [62] 62  Heart Rate:  [66-91] 71  Resp:  [5-17] 10  BP: (109-210)/(41-95) 140/88  FiO2 (%):  [40 %] 40 %  SpO2:  [94 %-100 %] 94 %    Constitutional: extubated, no apparent distress   Lungs: Clear to auscultation bilaterally, no crackles or wheezing off O2    Cardiovascular: Regular rate and rhythm, normal S1 and S2, and no murmur noted   Abdomen: Normal bowel sounds, soft, non-distended, non-tender   Skin: No rashes, no cyanosis,some edema   Other: Mental status off          Data:   All microbiology laboratory data reviewed.  Recent Labs   Lab Test  11/16/17   0525  11/15/17   0505  11/14/17   0905   WBC  7.4  7.1  7.5   HGB  10.9*  11.7  11.3*   HCT  33.5*  37.0  34.4*   MCV  93  95  93   PLT  164  154  152     Recent Labs   Lab Test  11/16/17   0525  11/15/17   0505  11/14/17   0905   CR  1.79*  1.87*  1.76*     Recent Labs   Lab Test  05/25/16   2116   SED  53*     Recent Labs   Lab Test  11/13/17   1714  11/13/17   1646  11/13/17   1613  11/06/17   1802  11/06/17   1720  11/06/17   1652  10/27/17   1425  10/16/17   1445  07/11/17   1400   CULT  No growth after 3 days  >100,000 colonies/mL  Candida albicans / dubliniensis  Candida albicans and Candida dubliniensis are not routinely speciated  Susceptibility testing not routinely done  *  No growth after 3 days  >100,000 colonies/mL  Citrobacter freundii complex  *  " No growth  No growth  50,000 to 100,000 colonies/mL  Candida albicans / dubliniensis  Candida albicans and Candida dubliniensis are not routinely speciated  *  >100,000 colonies/mL  Escherichia coli  *  >100,000 colonies/mL  Klebsiella pneumoniae  *  <10,000 colonies/mL  Strain 2  Klebsiella pneumoniae  *  10,000 to 50,000 colonies/mL Enterococcus faecalis  <10,000 colonies/mL Staphylococcus lugdunensis  *

## 2017-11-16 NOTE — PLAN OF CARE
Problem: Patient Care Overview  Goal: Plan of Care/Patient Progress Review  Outcome: No Change  VSS, Alert, oriented to self.  Confused and forgetful.  Incontinent of stool.  Excoriated brandie-area, nonblanchable redness on coccyx and skin tear right groin and right armpit, WOC consult entered and MD notified.  Takes PO medications well.  Refused some medications.  Bedrest, does not ambulate.  Contact precautions for VRE ESBL.

## 2017-11-16 NOTE — PLAN OF CARE
Problem: Patient Care Overview  Goal: Plan of Care/Patient Progress Review  Outcome: Improving  Pt extubated to aerosol mask at 1330. Now on room air satting 98-99%. Denies any pain. Passed bedside swallow, now taking clear liquids. Slightly confused at times. Generalized weakness.  in and updated with POC. Continue to monitor.

## 2017-11-17 LAB
ANION GAP SERPL CALCULATED.3IONS-SCNC: 10 MMOL/L (ref 3–14)
BASOPHILS # BLD AUTO: 0 10E9/L (ref 0–0.2)
BASOPHILS NFR BLD AUTO: 0.3 %
BUN SERPL-MCNC: 42 MG/DL (ref 7–30)
CALCIUM SERPL-MCNC: 8 MG/DL (ref 8.5–10.1)
CHLORIDE SERPL-SCNC: 108 MMOL/L (ref 94–109)
CO2 SERPL-SCNC: 19 MMOL/L (ref 20–32)
CREAT SERPL-MCNC: 1.69 MG/DL (ref 0.52–1.04)
DIFFERENTIAL METHOD BLD: ABNORMAL
EOSINOPHIL # BLD AUTO: 0.1 10E9/L (ref 0–0.7)
EOSINOPHIL NFR BLD AUTO: 1 %
ERYTHROCYTE [DISTWIDTH] IN BLOOD BY AUTOMATED COUNT: 14.3 % (ref 10–15)
GFR SERPL CREATININE-BSD FRML MDRD: 30 ML/MIN/1.7M2
GLUCOSE BLDC GLUCOMTR-MCNC: 114 MG/DL (ref 70–99)
GLUCOSE BLDC GLUCOMTR-MCNC: 128 MG/DL (ref 70–99)
GLUCOSE BLDC GLUCOMTR-MCNC: 134 MG/DL (ref 70–99)
GLUCOSE BLDC GLUCOMTR-MCNC: 159 MG/DL (ref 70–99)
GLUCOSE BLDC GLUCOMTR-MCNC: 162 MG/DL (ref 70–99)
GLUCOSE SERPL-MCNC: 120 MG/DL (ref 70–99)
HCT VFR BLD AUTO: 33.5 % (ref 35–47)
HGB BLD-MCNC: 10.9 G/DL (ref 11.7–15.7)
IMM GRANULOCYTES # BLD: 0 10E9/L (ref 0–0.4)
IMM GRANULOCYTES NFR BLD: 0.6 %
LYMPHOCYTES # BLD AUTO: 1.5 10E9/L (ref 0.8–5.3)
LYMPHOCYTES NFR BLD AUTO: 21.4 %
MAGNESIUM SERPL-MCNC: 1.9 MG/DL (ref 1.6–2.3)
MCH RBC QN AUTO: 30.4 PG (ref 26.5–33)
MCHC RBC AUTO-ENTMCNC: 32.5 G/DL (ref 31.5–36.5)
MCV RBC AUTO: 94 FL (ref 78–100)
MONOCYTES # BLD AUTO: 0.7 10E9/L (ref 0–1.3)
MONOCYTES NFR BLD AUTO: 9 %
NEUTROPHILS # BLD AUTO: 4.9 10E9/L (ref 1.6–8.3)
NEUTROPHILS NFR BLD AUTO: 67.7 %
NRBC # BLD AUTO: 0 10*3/UL
NRBC BLD AUTO-RTO: 0 /100
PLATELET # BLD AUTO: 162 10E9/L (ref 150–450)
POTASSIUM SERPL-SCNC: 4.1 MMOL/L (ref 3.4–5.3)
RBC # BLD AUTO: 3.58 10E12/L (ref 3.8–5.2)
SODIUM SERPL-SCNC: 137 MMOL/L (ref 133–144)
WBC # BLD AUTO: 7.2 10E9/L (ref 4–11)

## 2017-11-17 PROCEDURE — A9270 NON-COVERED ITEM OR SERVICE: HCPCS | Mod: GY | Performed by: NURSE PRACTITIONER

## 2017-11-17 PROCEDURE — 25000128 H RX IP 250 OP 636: Performed by: INTERNAL MEDICINE

## 2017-11-17 PROCEDURE — A9270 NON-COVERED ITEM OR SERVICE: HCPCS | Mod: GY | Performed by: SURGERY

## 2017-11-17 PROCEDURE — 83735 ASSAY OF MAGNESIUM: CPT | Performed by: NURSE PRACTITIONER

## 2017-11-17 PROCEDURE — 25000128 H RX IP 250 OP 636: Performed by: SURGERY

## 2017-11-17 PROCEDURE — 00000146 ZZHCL STATISTIC GLUCOSE BY METER IP

## 2017-11-17 PROCEDURE — 25000131 ZZH RX MED GY IP 250 OP 636 PS 637: Mod: GY | Performed by: INTERNAL MEDICINE

## 2017-11-17 PROCEDURE — 25000132 ZZH RX MED GY IP 250 OP 250 PS 637: Mod: GY | Performed by: INTERNAL MEDICINE

## 2017-11-17 PROCEDURE — 40000275 ZZH STATISTIC RCP TIME EA 10 MIN

## 2017-11-17 PROCEDURE — 99232 SBSQ HOSP IP/OBS MODERATE 35: CPT | Performed by: INTERNAL MEDICINE

## 2017-11-17 PROCEDURE — 25000132 ZZH RX MED GY IP 250 OP 250 PS 637: Mod: GY | Performed by: NURSE PRACTITIONER

## 2017-11-17 PROCEDURE — 99212 OFFICE O/P EST SF 10 MIN: CPT

## 2017-11-17 PROCEDURE — A9270 NON-COVERED ITEM OR SERVICE: HCPCS | Mod: GY | Performed by: INTERNAL MEDICINE

## 2017-11-17 PROCEDURE — 36415 COLL VENOUS BLD VENIPUNCTURE: CPT | Performed by: NURSE PRACTITIONER

## 2017-11-17 PROCEDURE — 40000901 ZZH STATISTIC WOC PT EDUCATION, 0-15 MIN

## 2017-11-17 PROCEDURE — 93010 ELECTROCARDIOGRAM REPORT: CPT | Performed by: INTERNAL MEDICINE

## 2017-11-17 PROCEDURE — 25000131 ZZH RX MED GY IP 250 OP 636 PS 637: Mod: GY | Performed by: SURGERY

## 2017-11-17 PROCEDURE — 93005 ELECTROCARDIOGRAM TRACING: CPT

## 2017-11-17 PROCEDURE — 80048 BASIC METABOLIC PNL TOTAL CA: CPT | Performed by: NURSE PRACTITIONER

## 2017-11-17 PROCEDURE — 12000000 ZZH R&B MED SURG/OB

## 2017-11-17 PROCEDURE — 27210995 ZZH RX 272: Performed by: NURSE PRACTITIONER

## 2017-11-17 PROCEDURE — 85025 COMPLETE CBC W/AUTO DIFF WBC: CPT | Performed by: NURSE PRACTITIONER

## 2017-11-17 PROCEDURE — 25000125 ZZHC RX 250: Performed by: INTERNAL MEDICINE

## 2017-11-17 RX ORDER — FAMOTIDINE 20 MG/1
20 TABLET, FILM COATED ORAL DAILY
Status: DISCONTINUED | OUTPATIENT
Start: 2017-11-17 | End: 2017-11-19 | Stop reason: HOSPADM

## 2017-11-17 RX ORDER — CINACALCET 30 MG/1
30 TABLET, FILM COATED ORAL EVERY OTHER DAY
Status: DISCONTINUED | OUTPATIENT
Start: 2017-11-19 | End: 2017-11-19 | Stop reason: HOSPADM

## 2017-11-17 RX ORDER — TACROLIMUS 1 MG/1
3 CAPSULE ORAL 2 TIMES DAILY
Status: DISCONTINUED | OUTPATIENT
Start: 2017-11-17 | End: 2017-11-19 | Stop reason: HOSPADM

## 2017-11-17 RX ADMIN — OXCARBAZEPINE 300 MG: 300 TABLET, FILM COATED ORAL at 10:24

## 2017-11-17 RX ADMIN — GABAPENTIN 900 MG: 300 CAPSULE ORAL at 21:35

## 2017-11-17 RX ADMIN — CLONIDINE HYDROCHLORIDE 0.1 MG: 0.1 TABLET ORAL at 21:35

## 2017-11-17 RX ADMIN — INSULIN GLARGINE 12 UNITS: 100 INJECTION, SOLUTION SUBCUTANEOUS at 22:48

## 2017-11-17 RX ADMIN — SODIUM CHLORIDE: 4.5 INJECTION, SOLUTION INTRAVENOUS at 04:54

## 2017-11-17 RX ADMIN — VITAMIN D, TAB 1000IU (100/BT) 4000 UNITS: 25 TAB at 21:34

## 2017-11-17 RX ADMIN — LABETALOL HYDROCHLORIDE 100 MG: 100 TABLET, FILM COATED ORAL at 21:35

## 2017-11-17 RX ADMIN — FAMOTIDINE 20 MG: 20 TABLET ORAL at 21:35

## 2017-11-17 RX ADMIN — LEVOTHYROXINE SODIUM 50 MCG: 50 TABLET ORAL at 10:25

## 2017-11-17 RX ADMIN — LABETALOL HYDROCHLORIDE 100 MG: 100 TABLET, FILM COATED ORAL at 10:25

## 2017-11-17 RX ADMIN — CINACALCET HYDROCHLORIDE 30 MG: 30 TABLET, COATED ORAL at 10:25

## 2017-11-17 RX ADMIN — GABAPENTIN 900 MG: 300 CAPSULE ORAL at 10:24

## 2017-11-17 RX ADMIN — HEPARIN SODIUM 5000 UNITS: 5000 INJECTION, SOLUTION INTRAVENOUS; SUBCUTANEOUS at 13:28

## 2017-11-17 RX ADMIN — TACROLIMUS 3 MG: 1 CAPSULE ORAL at 11:04

## 2017-11-17 RX ADMIN — PREDNISONE 5 MG: 5 TABLET ORAL at 10:24

## 2017-11-17 RX ADMIN — HEPARIN SODIUM 5000 UNITS: 5000 INJECTION, SOLUTION INTRAVENOUS; SUBCUTANEOUS at 21:59

## 2017-11-17 RX ADMIN — ASPIRIN 81 MG 81 MG: 81 TABLET ORAL at 10:25

## 2017-11-17 RX ADMIN — INSULIN ASPART 1 UNITS: 100 INJECTION, SOLUTION INTRAVENOUS; SUBCUTANEOUS at 18:10

## 2017-11-17 RX ADMIN — VITAMIN D, TAB 1000IU (100/BT) 4000 UNITS: 25 TAB at 10:24

## 2017-11-17 RX ADMIN — TACROLIMUS 3 MG: 1 CAPSULE ORAL at 21:34

## 2017-11-17 RX ADMIN — HYDRALAZINE HYDROCHLORIDE 10 MG: 20 INJECTION INTRAMUSCULAR; INTRAVENOUS at 22:58

## 2017-11-17 RX ADMIN — FLUCONAZOLE 200 MG: 2 INJECTION INTRAVENOUS at 11:06

## 2017-11-17 RX ADMIN — MECLIZINE HCL 12.5 MG: 12.5 TABLET ORAL at 23:06

## 2017-11-17 RX ADMIN — CLONIDINE HYDROCHLORIDE 0.1 MG: 0.1 TABLET ORAL at 10:25

## 2017-11-17 RX ADMIN — MEROPENEM AND SODIUM CHLORIDE 500 MG: 500 INJECTION, SOLUTION INTRAVENOUS at 04:53

## 2017-11-17 RX ADMIN — OXCARBAZEPINE 300 MG: 300 TABLET, FILM COATED ORAL at 21:35

## 2017-11-17 NOTE — PLAN OF CARE
Problem: Sepsis/Septic Shock (Adult)  Goal: Signs and Symptoms of Listed Potential Problems Will be Absent, Minimized or Managed (Sepsis/Septic Shock)  Signs and symptoms of listed potential problems will be absent, minimized or managed by discharge/transition of care (reference Sepsis/Septic Shock (Adult) CPG).   Outcome: Improving  Vital signs stable,  Afebrile, lung sounds diminished.  Confused earlier in the morning,  has a rico due to neurogenic bladder, needs to be turned and repositioned every two hours, has a BN today. On Antibiotics , blood sugars 120 and 114. On antibiotics.    Good Halie care.

## 2017-11-17 NOTE — PROGRESS NOTES
[facility]  Infectious Disease Progress Note          Assessment and Plan:   IMPRESSION:   1.  A 71-year-old female well known to us from prior admissions, admitted with mental status changes, respiratory failure and probable sepsis, by history most likely urosepsis, although not entirely clear.   2.  Acute respiratory failure, not clear pneumonia, probably related to sepsis.   3.  Recent urinary tract infection, apparently has been on Cipro.   4.  Recurrent urinary tract infections, chronic Johnson catheter in place, has had vancomycin-resistant enterococcus and extended spectrum beta lactamase in the past.   5.  Diabetes mellitus.   6.  Prior renal transplant, on chronic immunosuppressant therapy without major immunosuppressing infections, renal function slightly abnormal, but stable.   7.  Sulfa allergy and listed quinolone allergy, apparently just got quinolone and does not have a true quinolone allergy, but has had reproducible neurologic side-effects in the past.  I think we successfully re-challenged her with Cipro at one time in the past and apparently again now.        RECOMMENDATIONS:   1. Cxs wo sig +. procal low, no fever, WBC nl, ? No infection  2.  DC meropenem no further conventional antibiotics, 11/6 UTI org adequately treated and no other +. Doubt candiduria significant but short course flucon in case, po any time plan 5 days more  3.  ? cipro related mental status changes as actual dx, had prior reproducible(seen by me) true quinolone related neuro issues        Interval History:   extubated and doing better; no cp, sob, n/v/d, or abd pain. cx only candida in cath UC, recent + UC fairly R citrobacter  BC neg procal low WBC nl  Mental status now ? at baseline(pt known to me)              Medications:       famotidine  20 mg Oral Daily     insulin glargine  12 Units Subcutaneous At Bedtime     insulin aspart  1-7 Units Subcutaneous TID AC     insulin aspart  1-5 Units  "Subcutaneous At Bedtime     labetalol  100 mg Oral BID     cinacalcet  30 mg Oral Daily     famotidine  20 mg Oral BID     gabapentin  900 mg Oral BID     OXcarbazepine  300 mg Oral BID     influenza Vac Split High-Dose  0.5 mL Intramuscular Prior to discharge     fluconazole  200 mg Intravenous Q24H     predniSONE  5 mg Oral Daily     heparin  5,000 Units Subcutaneous Q8H     aspirin  81 mg Oral Daily     cholecalciferol  4,000 Units Oral BID     cloNIDine  0.1 mg Oral BID     levothyroxine  50 mcg Oral Daily                  Physical Exam:   Blood pressure 151/52, pulse 74, temperature 97.6  F (36.4  C), temperature source Oral, resp. rate 16, height 1.676 m (5' 5.98\"), weight 101.7 kg (224 lb 1.6 oz), SpO2 97 %, not currently breastfeeding.  Wt Readings from Last 2 Encounters:   11/17/17 101.7 kg (224 lb 1.6 oz)   11/08/17 103.5 kg (228 lb 2.8 oz)     Vital Signs with Ranges  Temp:  [97.6  F (36.4  C)-98  F (36.7  C)] 97.6  F (36.4  C)  Pulse:  [71-74] 74  Heart Rate:  [64-75] 75  Resp:  [8-16] 16  BP: (114-184)/(38-65) 151/52  SpO2:  [97 %-99 %] 97 %    Constitutional: extubated, no apparent distress   Lungs: Clear to auscultation bilaterally, no crackles or wheezing off O2    Cardiovascular: Regular rate and rhythm, normal S1 and S2, and no murmur noted   Abdomen: Normal bowel sounds, soft, non-distended, non-tender   Skin: No rashes, no cyanosis,some edema   Other: Mental status off but near baseline          Data:   All microbiology laboratory data reviewed.  Recent Labs   Lab Test  11/17/17   0708  11/16/17   0525  11/15/17   0505   WBC  7.2  7.4  7.1   HGB  10.9*  10.9*  11.7   HCT  33.5*  33.5*  37.0   MCV  94  93  95   PLT  162  164  154     Recent Labs   Lab Test  11/17/17   0708  11/16/17   0525  11/15/17   0505   CR  1.69*  1.79*  1.87*     Recent Labs   Lab Test  05/25/16   2116   SED  53*     Recent Labs   Lab Test  11/13/17   1714  11/13/17   1646  11/13/17   1613  11/06/17   1802  11/06/17   1720  " 11/06/17   1652  10/27/17   1425  10/16/17   1445  07/11/17   1400   CULT  No growth after 4 days  >100,000 colonies/mL  Candida albicans / dubliniensis  Candida albicans and Candida dubliniensis are not routinely speciated  Susceptibility testing not routinely done  *  No growth after 4 days  >100,000 colonies/mL  Citrobacter freundii complex  *  No growth  No growth  50,000 to 100,000 colonies/mL  Candida albicans / dubliniensis  Candida albicans and Candida dubliniensis are not routinely speciated  *  >100,000 colonies/mL  Escherichia coli  *  >100,000 colonies/mL  Klebsiella pneumoniae  *  <10,000 colonies/mL  Strain 2  Klebsiella pneumoniae  *  10,000 to 50,000 colonies/mL Enterococcus faecalis  <10,000 colonies/mL Staphylococcus lugdunensis  *

## 2017-11-17 NOTE — PROGRESS NOTES
Renal Medicine Progress Note            Assessment/Plan:     71 y.o woman with ESRD s/p LDKT and neurogenic bladder with recurrent UTIs, admitted for AMS presumed 2/2 another UTI.       # LDKT with allograft serum ~ 0.9-1.1 mg/dl. Scr has been up and down. Scr was 1.2 mg/dl on 11/7. Acute component is stable.                         -biopsy in April with FSGS and 40% tubulo-interstitial and global scaring                         -goal TAC level is 4-6                         -tacrolimus at 3 mg bid po bid                          - prednisone 5 mg daily       # Neurogenic bladder with recurrent UTIs                         -new rico                         -UC grew candida-->started Diflucan     # Hypokalemia. On K protocol.      # Hypertension. BP is controlled.      # AMS. Resolved.      #  IDDM. On insulin gtt    Plan.  # TAC 3 mg bid. Monitor TAC level to goal of 4-6. Follow-up with transplant service at the  after discharge.   # D/C IVF  # Change Sensipar to every other day given hypocalcemia        Interval History:     Patient is in good spirits. She wants to know if she could go home today. No other complaints.          Medications and Allergies:       famotidine  20 mg Oral Daily     tacrolimus  3 mg Oral BID     insulin glargine  12 Units Subcutaneous At Bedtime     insulin aspart  1-7 Units Subcutaneous TID AC     insulin aspart  1-5 Units Subcutaneous At Bedtime     labetalol  100 mg Oral BID     cinacalcet  30 mg Oral Daily     gabapentin  900 mg Oral BID     OXcarbazepine  300 mg Oral BID     influenza Vac Split High-Dose  0.5 mL Intramuscular Prior to discharge     fluconazole  200 mg Intravenous Q24H     predniSONE  5 mg Oral Daily     heparin  5,000 Units Subcutaneous Q8H     aspirin  81 mg Oral Daily     cholecalciferol  4,000 Units Oral BID     cloNIDine  0.1 mg Oral BID     levothyroxine  50 mcg Oral Daily        Allergies   Allergen Reactions     Bactrim Ds [Sulfamethoxazole W/Trimethoprim]   "    Creatinine level increased     Atorvastatin Calcium      myalgias, muscle aches     Codeine Nausea and Vomiting     Darvocet [Propoxyphene N-Apap] Nausea and Vomiting     Hydromorphone      Levofloxacin Other (See Comments) and Diarrhea     hallucinations     Morphine      Nausea and vomiting     Niaspan [Niacin] GI Disturbance     Flushing even at low dose, GI upset     Percocet [Oxycodone-Acetaminophen]      Vomiting after taking med couple of times     Vicodin [Hydrocodone-Acetaminophen] Nausea and Vomiting            Physical Exam:   Vitals were reviewed  Heart Rate: 69, Blood pressure 117/69, pulse 74, temperature 97.9  F (36.6  C), temperature source Oral, resp. rate 16, height 1.676 m (5' 5.98\"), weight 101.7 kg (224 lb 1.6 oz), SpO2 97 %, not currently breastfeeding.    Wt Readings from Last 3 Encounters:   11/17/17 101.7 kg (224 lb 1.6 oz)   11/08/17 103.5 kg (228 lb 2.8 oz)   04/27/17 90.5 kg (199 lb 8 oz)       Intake/Output Summary (Last 24 hours) at 11/17/17 1221  Last data filed at 11/17/17 0653   Gross per 24 hour   Intake             1258 ml   Output             1400 ml   Net             -142 ml     GENERAL APPEARANCE: NAD  HEENT:  EOMI. OMM.  RESP: lungs cta b c good efforts, no crackles, rhonchi or wheezes  CV: RRR, nl S1/S2, no m/r/g   ABDOMEN: obese, soft.  EXTREMITIES/SKIN: no rashes/lesions on observed skin; some dependent pitting edema of the back and thighs  Neuro: a/o x 3. Pleasant. Back to baseline.           Data:     CBC RESULTS:     Recent Labs  Lab 11/17/17  0708 11/16/17  0525 11/15/17  0505 11/14/17  0905 11/13/17  1613 11/13/17  1152   WBC 7.2 7.4 7.1 7.5 6.0 8.2   RBC 3.58* 3.61* 3.91 3.69* 3.75* 3.40*   HGB 10.9* 10.9* 11.7 11.3* 11.3* 10.3*   HCT 33.5* 33.5* 37.0 34.4* 35.7 32.8*    164 154 152 154 163       Basic Metabolic Panel:    Recent Labs  Lab 11/17/17  0708 11/16/17  0525 11/15/17  0505 11/14/17  0905 11/14/17  0455 11/13/17  1613 11/13/17  1152    138 139 " 140  --  137 136   POTASSIUM 4.1 3.3* 3.8 3.6  --  4.2 4.3   CHLORIDE 108 107 107 108  --  103 104   CO2 19* 22 22 22  --  25 20   BUN 42* 43* 44* 45*  --  47* 52*   CR 1.69* 1.79* 1.87* 1.76* 1.76* 2.06* 2.02*   * 109* 156* 136*  --  294* 305*   ROGER 8.0* 7.7* 7.9* 8.0*  --  7.4* 7.5*       INRNo lab results found in last 7 days.   Attestation:   I have reviewed today's relevant vital signs, notes, medications, labs and imaging.    Gaurav Carson MD  [facility]rology  Office phone :[phone]  Pager: [phone]

## 2017-11-17 NOTE — PROGRESS NOTES
Note drug interaction issue, DCc flucon, very low likelihood candiduria is sig so not worth continuing

## 2017-11-17 NOTE — PROVIDER NOTIFICATION
Prescriber Notification Note    The pharmacist has communicated with this patient's provider regarding a concern or therapy recommendation.    Notified Person: Gaurav Carson  Date/Time of Notification: 11/17/17 0757  Interaction: phone  Concern/Recommendation:discussed tacrolimus     Comments/Additional Details:start tacrolimus 3 mg po bid this morning. Check a trough tomorrow and Sunday. Call nephrology if level is >6 tomorrow, since she would not be at steady state.

## 2017-11-17 NOTE — PROVIDER NOTIFICATION
TORB Dr. Olmedo - can discontinue fluconazole with concern that concurrent fluconazole and tacrolimus may be having additive effect increasing QTc (discussed with Dr. Marin).

## 2017-11-17 NOTE — PLAN OF CARE
Problem: Patient Care Overview  Goal: Plan of Care/Patient Progress Review  Outcome: No Change  VSS on RA/ CPAP for sleep. Denies pain. Slightly paranoid/confused at times, feels people are always taking her things. Refused repositioning x1. No BM this shift. Johnson in place and patent. Continuing on IV antibiotics for now.

## 2017-11-17 NOTE — PROGRESS NOTES
Took over care this AM.  Patient recalled some events from yesterday.  Emotionally labile, tearful, withdrawn, then laughing.  Also some report of questionable hallucinations and confused speech overnight.  Very weak/deconditioned.  Spouse stated last meal was last Saturday.  Insulin gtt and tacrolimus gtt stopped.  Spouse updated on phone regarding transfer to [facility].  Report called and transferred via cart in stable condition.

## 2017-11-17 NOTE — PROGRESS NOTES
[facility]    Hospitalist Progress Note    Date of Service (when I saw the patient): 11/17/2017    Assessment & Plan   Gem Jade is a 71 year old female who was admitted on 11/13/2017.    Gem Jade is a 71 year old female who was admitted on 11/13/2017 with altered mental status related to UTI. She has a PMH of kidney transplant, neurogenic bladder with chronic indwelling catheter and recurrent UTIs.      Recurrent Sepsis secondary to UTI (candida albicans):  History of neurogenic bladder and indwelling rico catheter. She has frequent UTIs. UC this admission grew candida. She recently had another UTI with positive UC on 11/6/17 - grew Citrobacter freundii complex. She has a history of klebsiella, ESBL, VRE.  Indwelling rico changed this admission.   - continue  diflucan   - d/jose meropenem per ID      Hypokalemia:   Improved      Acute Respiratory Failure secondary to Urosepsis/AMS: The patient was initially brought in the ED with a GCS 5-6, intubated for airway protection. She was successfully extubated on 11/15/17. She has been weaned to room air, following commands but mentation remains variable. Per the patient's , she often has issues following a UTI with confusion and paranoia.   - continue RT support for  to set up home CPAP and pulmonary toilet   - delirium prevention     Renal Transplant with Immunosuppression: LDKT with allograft serum. Transplant in 1999. Baseline serum creatinine 0.9-2.0 and is 1.79 now. The patient had a biopsy in April with Focal segmental glomerulosclerosis and 40% tubulo-interstitial and global scarring. Managed on Tacrolimus and prednisone typically.   - appreciate Nephrology recommendations and input  - restarted tacrolimus per nephrology 3mg PO BID. Recheck level tomorrow and Sunday per Nephrology call if level>6.0  - restart cinacalcet today now that she is able to take PO  - continue prednisone PTA dose   - Transplant coordinator, Porsha  Ricardo ([phone] M-W, [phone] Th-F), updated by Intensivist     Hypertension: Well managed on labetolol, and clonidine in the outpatient setting. Her BP has been well managed during her hospitalization, currently 140s/80s, HR 80s. Her home meds have been restarted.   - continue PTA labetolol and clonidine  - labetolol IV PRN       Obstructive Sleep Apnea: CPAP depedent   - RT consult to help set up CPAP   - use CPAP on home settings at night     GERD: Managed on Pepcid PO at home.  - Restart pepcid PO  - advance diet as tolerated     Hypothyroidism: TSH 0.77 on 11/13/17  - restart levothyroxine at PTA dose     Diabetes Type II: Complicated by severe diabetic neuropathy and arthropathy (Charcot foot), chronic pain. Most recent HgbA1C 7.7.  - transition from insulin drip to medium sliding scale  - restart lantus at lower dose (12 units compared to 24)      DVT Prophylaxis: Heparin SQ  Code Status: Full Code     Disposition: Expected discharge in 2-3 days once medically stable .         Janki Marin MD  [phone] (P)      Interval History    Pleasantly confused and paranoid at times. Cr stable. D/jose Iv antibiotics per ID. Continued fluconazole for now   -Data reviewed today: I reviewed all new labs and imaging results over the last 24 hours. I personally reviewed no images or EKG's today.    Physical Exam   Temp: 97.9  F (36.6  C) Temp src: Oral BP: 117/69 Pulse: 74 Heart Rate: 69 Resp: 16 SpO2: 97 % O2 Device: None (Room air)    Vitals:    11/15/17 0600 11/16/17 0400 11/17/17 0430   Weight: 102.9 kg (226 lb 13.7 oz) 101.8 kg (224 lb 6.9 oz) 101.7 kg (224 lb 1.6 oz)     Vital Signs with Ranges  Temp:  [97.6  F (36.4  C)-98  F (36.7  C)] 97.9  F (36.6  C)  Pulse:  [71-74] 74  Heart Rate:  [69-75] 69  Resp:  [12-16] 16  BP: (114-184)/(38-69) 117/69  SpO2:  [97 %-98 %] 97 %  I/O last 3 completed shifts:  In: 1872.1 [P.O.:120; I.V.:1752.1]  Out: 1725 [Urine:1725]    Constitutional: Awake, alert, cooperative,  no apparent distress  Respiratory: Clear to auscultation bilaterally, no crackles or wheezing  Cardiovascular: Regular rate and rhythm, normal S1 and S2, and no murmur noted  GI: Normal bowel sounds, soft, non-distended, non-tender  Skin/Integumen: No rashes, no cyanosis, no edema  Other:     Medications     - MEDICATION INSTRUCTIONS -       IV fluid REPLACEMENT ONLY         famotidine  20 mg Oral Daily     tacrolimus  3 mg Oral BID     [START ON 11/19/2017] cinacalcet  30 mg Oral Every Other Day     insulin glargine  12 Units Subcutaneous At Bedtime     insulin aspart  1-7 Units Subcutaneous TID AC     insulin aspart  1-5 Units Subcutaneous At Bedtime     labetalol  100 mg Oral BID     gabapentin  900 mg Oral BID     OXcarbazepine  300 mg Oral BID     influenza Vac Split High-Dose  0.5 mL Intramuscular Prior to discharge     fluconazole  200 mg Intravenous Q24H     predniSONE  5 mg Oral Daily     heparin  5,000 Units Subcutaneous Q8H     aspirin  81 mg Oral Daily     cholecalciferol  4,000 Units Oral BID     cloNIDine  0.1 mg Oral BID     levothyroxine  50 mcg Oral Daily       Data     Recent Labs  Lab 11/17/17  0708 11/16/17  0525 11/15/17  0505  11/13/17  1613   WBC 7.2 7.4 7.1  < > 6.0   HGB 10.9* 10.9* 11.7  < > 11.3*   MCV 94 93 95  < > 95    164 154  < > 154    138 139  < > 137   POTASSIUM 4.1 3.3* 3.8  < > 4.2   CHLORIDE 108 107 107  < > 103   CO2 19* 22 22  < > 25   BUN 42* 43* 44*  < > 47*   CR 1.69* 1.79* 1.87*  < > 2.06*   ANIONGAP 10 9 10  < > 9   ROGER 8.0* 7.7* 7.9*  < > 7.4*   * 109* 156*  < > 294*   ALBUMIN  --  2.7* 2.8*  --  3.0*   PROTTOTAL  --  7.0 7.4  --  7.7   BILITOTAL  --  0.3 0.3  --  0.2   ALKPHOS  --  64 70  --  67   ALT  --  11 11  --  11   AST  --  13 12  --  8   TROPI  --   --   --   --  <0.015   < > = values in this interval not displayed.    No results found for this or any previous visit (from the past 24 hour(s)).

## 2017-11-17 NOTE — PLAN OF CARE
"Problem: Patient Care Overview  Goal: Plan of Care/Patient Progress Review  Outcome: No Change  A/Ox3. Disoriented to time. Pt slow to respond. Paranoid; refused all PO medications stating \"these aren't my regular medications. You are trying to poison me.\" Hypertensive, PRN hydralazine given x1. Denies pain. O2 stable on RA. Wears CPAP at night. Nonblanchable erythema to coccyx, new sacral Mepilex dressing applied. Pt has excoriation underneath skin folds and in perineal area; wound cleanser/intradry applied. Chronic Johnson; patent and draining adequately. Scheduled Merrem given as ordered. K low 3.3; replaced (one-time dose) on day shift; not on protocol, recheck in AM. BG monitored. Bedrest at baseline; T/R q2h. Clear liquid diet ordered (ADAT); pt refused to eat dinner.  updated with POC. Will continue to monitor.      "

## 2017-11-17 NOTE — PROVIDER NOTIFICATION
Prescriber Notification Note    The pharmacist has communicated with this patient's provider regarding a concern or therapy recommendation.    Notified Person: Tania  Date/Time of Notification: 11/17/17 9270  Interaction: phone  Concern/Recommendation:notified her of QTc 474 prior to start of fluconazole. Fluconazole/tacrolimus/ondansetron can further increase the QTc.      Comments/Additional Details:d/c zofran. Tania will order a repeat EKG.

## 2017-11-18 LAB
CREAT SERPL-MCNC: 1.62 MG/DL (ref 0.52–1.04)
GFR SERPL CREATININE-BSD FRML MDRD: 31 ML/MIN/1.7M2
GLUCOSE BLDC GLUCOMTR-MCNC: 138 MG/DL (ref 70–99)
GLUCOSE BLDC GLUCOMTR-MCNC: 238 MG/DL (ref 70–99)
GLUCOSE BLDC GLUCOMTR-MCNC: 246 MG/DL (ref 70–99)
GLUCOSE BLDC GLUCOMTR-MCNC: 290 MG/DL (ref 70–99)
GLUCOSE BLDC GLUCOMTR-MCNC: 346 MG/DL (ref 70–99)

## 2017-11-18 PROCEDURE — 94660 CPAP INITIATION&MGMT: CPT

## 2017-11-18 PROCEDURE — A9270 NON-COVERED ITEM OR SERVICE: HCPCS | Mod: GY | Performed by: INTERNAL MEDICINE

## 2017-11-18 PROCEDURE — 25000131 ZZH RX MED GY IP 250 OP 636 PS 637: Mod: GY | Performed by: INTERNAL MEDICINE

## 2017-11-18 PROCEDURE — A9270 NON-COVERED ITEM OR SERVICE: HCPCS | Mod: GY | Performed by: NURSE PRACTITIONER

## 2017-11-18 PROCEDURE — 80197 ASSAY OF TACROLIMUS: CPT | Performed by: SURGERY

## 2017-11-18 PROCEDURE — 00000146 ZZHCL STATISTIC GLUCOSE BY METER IP

## 2017-11-18 PROCEDURE — 25000132 ZZH RX MED GY IP 250 OP 250 PS 637: Mod: GY | Performed by: NURSE PRACTITIONER

## 2017-11-18 PROCEDURE — 25000132 ZZH RX MED GY IP 250 OP 250 PS 637: Mod: GY | Performed by: INTERNAL MEDICINE

## 2017-11-18 PROCEDURE — 40000275 ZZH STATISTIC RCP TIME EA 10 MIN

## 2017-11-18 PROCEDURE — 25000128 H RX IP 250 OP 636: Performed by: INTERNAL MEDICINE

## 2017-11-18 PROCEDURE — 82565 ASSAY OF CREATININE: CPT | Performed by: INTERNAL MEDICINE

## 2017-11-18 PROCEDURE — 12000000 ZZH R&B MED SURG/OB

## 2017-11-18 PROCEDURE — 25000125 ZZHC RX 250: Performed by: INTERNAL MEDICINE

## 2017-11-18 PROCEDURE — 99232 SBSQ HOSP IP/OBS MODERATE 35: CPT | Performed by: INTERNAL MEDICINE

## 2017-11-18 PROCEDURE — 36415 COLL VENOUS BLD VENIPUNCTURE: CPT | Performed by: SURGERY

## 2017-11-18 RX ADMIN — LABETALOL HYDROCHLORIDE 100 MG: 100 TABLET, FILM COATED ORAL at 20:23

## 2017-11-18 RX ADMIN — GABAPENTIN 900 MG: 300 CAPSULE ORAL at 09:34

## 2017-11-18 RX ADMIN — VITAMIN D, TAB 1000IU (100/BT) 4000 UNITS: 25 TAB at 20:22

## 2017-11-18 RX ADMIN — GABAPENTIN 900 MG: 300 CAPSULE ORAL at 20:20

## 2017-11-18 RX ADMIN — VITAMIN D, TAB 1000IU (100/BT) 4000 UNITS: 25 TAB at 09:34

## 2017-11-18 RX ADMIN — INSULIN GLARGINE 24 UNITS: 100 INJECTION, SOLUTION SUBCUTANEOUS at 22:43

## 2017-11-18 RX ADMIN — HEPARIN SODIUM 5000 UNITS: 5000 INJECTION, SOLUTION INTRAVENOUS; SUBCUTANEOUS at 06:19

## 2017-11-18 RX ADMIN — OXCARBAZEPINE 300 MG: 300 TABLET, FILM COATED ORAL at 20:22

## 2017-11-18 RX ADMIN — FAMOTIDINE 20 MG: 20 TABLET ORAL at 20:22

## 2017-11-18 RX ADMIN — CLONIDINE HYDROCHLORIDE 0.1 MG: 0.1 TABLET ORAL at 09:33

## 2017-11-18 RX ADMIN — ASPIRIN 81 MG 81 MG: 81 TABLET ORAL at 09:33

## 2017-11-18 RX ADMIN — TACROLIMUS 3 MG: 1 CAPSULE ORAL at 09:33

## 2017-11-18 RX ADMIN — INSULIN ASPART 3 UNITS: 100 INJECTION, SOLUTION INTRAVENOUS; SUBCUTANEOUS at 09:06

## 2017-11-18 RX ADMIN — INSULIN ASPART 2 UNITS: 100 INJECTION, SOLUTION INTRAVENOUS; SUBCUTANEOUS at 12:34

## 2017-11-18 RX ADMIN — CLONIDINE HYDROCHLORIDE 0.1 MG: 0.1 TABLET ORAL at 20:20

## 2017-11-18 RX ADMIN — HEPARIN SODIUM 5000 UNITS: 5000 INJECTION, SOLUTION INTRAVENOUS; SUBCUTANEOUS at 20:30

## 2017-11-18 RX ADMIN — HEPARIN SODIUM 5000 UNITS: 5000 INJECTION, SOLUTION INTRAVENOUS; SUBCUTANEOUS at 13:57

## 2017-11-18 RX ADMIN — OXCARBAZEPINE 300 MG: 300 TABLET, FILM COATED ORAL at 09:33

## 2017-11-18 RX ADMIN — PREDNISONE 5 MG: 5 TABLET ORAL at 09:33

## 2017-11-18 RX ADMIN — TACROLIMUS 3 MG: 1 CAPSULE ORAL at 20:21

## 2017-11-18 RX ADMIN — LABETALOL HYDROCHLORIDE 100 MG: 100 TABLET, FILM COATED ORAL at 09:33

## 2017-11-18 RX ADMIN — LEVOTHYROXINE SODIUM 50 MCG: 50 TABLET ORAL at 09:33

## 2017-11-18 NOTE — PLAN OF CARE
"Problem: Patient Care Overview  Goal: Plan of Care/Patient Progress Review  Outcome: No Change  A&Ox3; disoriented to time. Pt slow to respond. Paranoid a bit about where bed was in room. Called . Staff moved bed closer to window per pt request.  Pt calm and cooperative. No complaints of pain on this shift. VSS ex hypertensive. BP did come down after PRN IV Hydralazine given on previous shift. 189 to 159 systolic.  Pt wears CPAP at night, OVSS. Mepilex on sacral area and interdry in place to groin. Johnson in place draining adequate amount of urine. No BM this shift, incontinent. L PIV SL. BG check at 2043=363. T&R q2. Did refuse last turn at 0630 d/t being \"too comfortable.\" educated patient on importance of repositioning.         "

## 2017-11-18 NOTE — PLAN OF CARE
Problem: Patient Care Overview  Goal: Plan of Care/Patient Progress Review  Outcome: Improving    Shift Update: Patient is A&Ox3; disoriented to time. Calm and cooperative on this shift. No complaints of pain. VSS ex hypertensive late this shift. Given IV hydralazine. O2 stable on RA. Pt wears CPAP at night. Pt has chronic Johnson (d/t neurogenic bladder); patent and draining adequately. Incontient of stool; no BM this shift. L PIV SL. IV fluids and antibiotics discontinued. Blood sugars running at 159 and 346. Pt is bedrest at baseline. T/R Q2H. Mechanical lift for transfers. Plan: Discharge pending. 2-3 days once stable.

## 2017-11-18 NOTE — PROGRESS NOTES
[facility]  Infectious Disease Progress Note          Assessment and Plan:   IMPRESSION:   1.  A 71-year-old female well known to us from prior admissions, admitted with mental status changes, respiratory failure and probable sepsis, by history most likely urosepsis, although not entirely clear.   2.  Acute respiratory failure, not clear pneumonia, probably related to sepsis.   3.  Recent urinary tract infection, apparently has been on Cipro.   4.  Recurrent urinary tract infections, chronic Johnson catheter in place, has had vancomycin-resistant enterococcus and extended spectrum beta lactamase in the past.   5.  Diabetes mellitus.   6.  Prior renal transplant, on chronic immunosuppressant therapy without major immunosuppressing infections, renal function slightly abnormal, but stable.   7.  Sulfa allergy and listed quinolone allergy, apparently just got quinolone and does not have a true quinolone allergy, but has had reproducible neurologic side-effects in the past.  I think we successfully re-challenged her with Cipro at one time in the past and apparently again now.        RECOMMENDATIONS:   1. Cxs wo sig +. procal low, no fever, WBC nl, ? No infection as issue  2.  DC meropenem no further conventional antibiotics, 11/6 UTI org adequately treated and no other +. Doubt candiduria significant , got short course flucon , off now due to drug interaction  3.  ? cipro related mental status changes as actual dx, had prior reproducible(seen by me) true quinolone related neuro issues  4 Watch off antimicrobials        Interval History:   extubated and doing better; no cp, sob, n/v/d, or abd pain. cx only candida in cath UC, recent + UC fairly R citrobacter  BC neg procal low WBC nl  Mental status now ? at baseline(pt known to me)              Medications:       famotidine  20 mg Oral Daily     tacrolimus  3 mg Oral BID     [START ON 11/19/2017] cinacalcet  30 mg Oral Every Other Day     insulin  "glargine  12 Units Subcutaneous At Bedtime     insulin aspart  1-7 Units Subcutaneous TID AC     insulin aspart  1-5 Units Subcutaneous At Bedtime     labetalol  100 mg Oral BID     gabapentin  900 mg Oral BID     OXcarbazepine  300 mg Oral BID     influenza Vac Split High-Dose  0.5 mL Intramuscular Prior to discharge     predniSONE  5 mg Oral Daily     heparin  5,000 Units Subcutaneous Q8H     aspirin  81 mg Oral Daily     cholecalciferol  4,000 Units Oral BID     cloNIDine  0.1 mg Oral BID     levothyroxine  50 mcg Oral Daily                  Physical Exam:   Blood pressure 151/59, pulse 76, temperature 96.8  F (36  C), temperature source Oral, resp. rate 18, height 1.676 m (5' 5.98\"), weight 101.7 kg (224 lb 1.6 oz), SpO2 98 %, not currently breastfeeding.  Wt Readings from Last 2 Encounters:   11/17/17 101.7 kg (224 lb 1.6 oz)   11/08/17 103.5 kg (228 lb 2.8 oz)     Vital Signs with Ranges  Temp:  [96.8  F (36  C)-98.2  F (36.8  C)] 96.8  F (36  C)  Pulse:  [76] 76  Heart Rate:  [69-89] 89  Resp:  [16-18] 18  BP: (117-189)/(49-69) 151/59  SpO2:  [95 %-98 %] 98 %    Constitutional: extubated, no apparent distress   Lungs: Clear to auscultation bilaterally, no crackles or wheezing off O2    Cardiovascular: Regular rate and rhythm, normal S1 and S2, and no murmur noted   Abdomen: Normal bowel sounds, soft, non-distended, non-tender   Skin: No rashes, no cyanosis,some edema   Other: Mental status off but near baseline          Data:   All microbiology laboratory data reviewed.  Recent Labs   Lab Test  11/17/17   0708  11/16/17   0525  11/15/17   0505   WBC  7.2  7.4  7.1   HGB  10.9*  10.9*  11.7   HCT  33.5*  33.5*  37.0   MCV  94  93  95   PLT  162  164  154     Recent Labs   Lab Test  11/17/17   0708  11/16/17   0525  11/15/17   0505   CR  1.69*  1.79*  1.87*     Recent Labs   Lab Test  05/25/16   2116   SED  53*     Recent Labs   Lab Test  11/13/17   1714  11/13/17   1646  11/13/17   1613  11/06/17   1802  " 11/06/17   1720  11/06/17   1652  10/27/17   1425  10/16/17   1445  07/11/17   1400   CULT  No growth after 5 days  >100,000 colonies/mL  Candida albicans / dubliniensis  Candida albicans and Candida dubliniensis are not routinely speciated  Susceptibility testing not routinely done  *  No growth after 5 days  >100,000 colonies/mL  Citrobacter freundii complex  *  No growth  No growth  50,000 to 100,000 colonies/mL  Candida albicans / dubliniensis  Candida albicans and Candida dubliniensis are not routinely speciated  *  >100,000 colonies/mL  Escherichia coli  *  >100,000 colonies/mL  Klebsiella pneumoniae  *  <10,000 colonies/mL  Strain 2  Klebsiella pneumoniae  *  10,000 to 50,000 colonies/mL Enterococcus faecalis  <10,000 colonies/mL Staphylococcus lugdunensis  *

## 2017-11-18 NOTE — PROGRESS NOTES
Renal Medicine Progress Note            Assessment/Plan:     71 y.o woman with ESRD s/p LDKT and neurogenic bladder with recurrent UTIs, admitted for AMS presumed 2/2 another UTI.       # LDKT with allograft serum ~ 0.9-1.1 mg/dl. Scr has been up and down. Scr was 1.2 mg/dl on 11/7. Acute component is stable.                         -biopsy in April with FSGS and 40% tubulo-interstitial and global scaring                         -goal TAC level is 4-6                         -tacrolimus at 3 mg bid po bid                          - prednisone 5 mg daily       # Neurogenic bladder with recurrent UTIs      #  IDDM. On insulin gtt     Plan.  # TAC 3 mg bid. Monitor TAC level to goal of 4-6. Follow-up with transplant service at the  after discharge.   # Sensipar to every other day given hypocalcemia  # Check TAC trough level Sunday  # Pt would like to be home for her birthday tomorrow.        Interval History:     No new issues. Pt is being observed for another day off abx.          Medications and Allergies:       insulin aspart  7 Units Subcutaneous TID w/meals     insulin glargine  24 Units Subcutaneous At Bedtime     famotidine  20 mg Oral Daily     tacrolimus  3 mg Oral BID     [START ON 11/19/2017] cinacalcet  30 mg Oral Every Other Day     insulin aspart  1-7 Units Subcutaneous TID AC     insulin aspart  1-5 Units Subcutaneous At Bedtime     labetalol  100 mg Oral BID     gabapentin  900 mg Oral BID     OXcarbazepine  300 mg Oral BID     influenza Vac Split High-Dose  0.5 mL Intramuscular Prior to discharge     predniSONE  5 mg Oral Daily     heparin  5,000 Units Subcutaneous Q8H     aspirin  81 mg Oral Daily     cholecalciferol  4,000 Units Oral BID     cloNIDine  0.1 mg Oral BID     levothyroxine  50 mcg Oral Daily        Allergies   Allergen Reactions     Bactrim Ds [Sulfamethoxazole W/Trimethoprim]      Creatinine level increased     Atorvastatin Calcium      myalgias, muscle aches     Codeine Nausea and  "Vomiting     Darvocet [Propoxyphene N-Apap] Nausea and Vomiting     Hydromorphone      Levofloxacin Other (See Comments) and Diarrhea     hallucinations     Morphine      Nausea and vomiting     Niaspan [Niacin] GI Disturbance     Flushing even at low dose, GI upset     Percocet [Oxycodone-Acetaminophen]      Vomiting after taking med couple of times     Vicodin [Hydrocodone-Acetaminophen] Nausea and Vomiting            Physical Exam:   Vitals were reviewed  Heart Rate: 74, Blood pressure 132/55, pulse 76, temperature 95.8  F (35.4  C), temperature source Oral, resp. rate 18, height 1.676 m (5' 5.98\"), weight 101.7 kg (224 lb 1.6 oz), SpO2 100 %, not currently breastfeeding.    Wt Readings from Last 3 Encounters:   11/17/17 101.7 kg (224 lb 1.6 oz)   11/08/17 103.5 kg (228 lb 2.8 oz)   04/27/17 90.5 kg (199 lb 8 oz)       Intake/Output Summary (Last 24 hours) at 11/18/17 1135  Last data filed at 11/18/17 0615   Gross per 24 hour   Intake              360 ml   Output             1700 ml   Net            -1340 ml       GENERAL APPEARANCE: NAD  HEENT:  EOMI. OMM.  RESP: lungs cta b c good efforts, no crackles, rhonchi or wheezes  CV: RRR, nl S1/S2, no m/r/g   ABDOMEN: obese, soft.  EXTREMITIES/SKIN: no rashes/lesions on observed skin; some dependent pitting edema of the back and thighs  Neuro: a/o x 3. Pleasant. Back to baseline.            Data:     CBC RESULTS:     Recent Labs  Lab 11/17/17  0708 11/16/17  0525 11/15/17  0505 11/14/17  0905 11/13/17  1613 11/13/17  1152   WBC 7.2 7.4 7.1 7.5 6.0 8.2   RBC 3.58* 3.61* 3.91 3.69* 3.75* 3.40*   HGB 10.9* 10.9* 11.7 11.3* 11.3* 10.3*   HCT 33.5* 33.5* 37.0 34.4* 35.7 32.8*    164 154 152 154 163       Basic Metabolic Panel:    Recent Labs  Lab 11/18/17  0818 11/17/17  0708 11/16/17  0525 11/15/17  0505 11/14/17  0905 11/14/17  0455 11/13/17  1613 11/13/17  1152   NA  --  137 138 139 140  --  137 136   POTASSIUM  --  4.1 3.3* 3.8 3.6  --  4.2 4.3   CHLORIDE  --  " 108 107 107 108  --  103 104   CO2  --  19* 22 22 22  --  25 20   BUN  --  42* 43* 44* 45*  --  47* 52*   CR 1.62* 1.69* 1.79* 1.87* 1.76* 1.76* 2.06* 2.02*   GLC  --  120* 109* 156* 136*  --  294* 305*   ROGER  --  8.0* 7.7* 7.9* 8.0*  --  7.4* 7.5*       INRNo lab results found in last 7 days.   Attestation:   I have reviewed today's relevant vital signs, notes, medications, labs and imaging.    Gaurav Carson MD  [facility] - Nephrology  Office phone :[phone]  Pager: [phone]

## 2017-11-18 NOTE — PLAN OF CARE
Problem: Patient Care Overview  Goal: Plan of Care/Patient Progress Review  Outcome: No Change  A/Ox4. VSS on RA, uses CPAP at night. Denied pain. LS dim. Tele: NSR. BS active, +flatus. Had episode of loose BM incontinence. Needs collection of stool to rule out c.diff. Johnson patent with adequate urine output. Turn/repo q2hrs for comfort/pressure relief changed dressing to coccyx on day shift, interdry applied to abdominal folds. Plan for possibly discharge tomorrow. Continue to monitor.

## 2017-11-18 NOTE — PROGRESS NOTES
[facility]    Hospitalist Progress Note    Date of Service (when I saw the patient): 11/18/2017    Assessment & Plan   Gem Jade is a 71 year old female who was admitted on 11/13/2017.    Gem Jade is a 71 year old female who was admitted on 11/13/2017 with altered mental status related to UTI. She has a PMH of kidney transplant, neurogenic bladder with chronic indwelling catheter and recurrent UTIs.      Recurrent Sepsis secondary to complicated UTI secondary to indwelling rico catheter (candida albicans):  History of neurogenic bladder and indwelling rico catheter. She has frequent UTIs. UC this admission grew candida. She recently had another UTI with positive UC on 11/6/17 - grew Citrobacter freundii complex. She has a history of klebsiella, ESBL, VRE.  Indwelling rico changed this admission.   - d/jose diflucan yesterdayon 11/17 as tacrolimus and fluconazole can cause QT prolongation and EKG showed prolonged QT at 486 worse from 475 few days prior   Placed on tele monitoring   Repeat EKG tomorrow   Candida in urine could be from colonization per ID   - d/jose meropenem per ID   Monitor off antimicrobials      Hypokalemia:   Improved      Acute Respiratory Failure secondary to Urosepsis/AMS: The patient was initially brought in the ED with a GCS 5-6, intubated for airway protection. She was successfully extubated on 11/15/17. She has been weaned to room air, following commands but mentation remains variable. Per the patient's , she often has issues following a UTI with confusion and paranoia.   - continue RT support for  to set up home CPAP and pulmonary toilet   - delirium prevention  Stable. Improved      Renal Transplant with Immunosuppression: LDKT with allograft serum. Transplant in 1999. Baseline serum creatinine 0.9-2.0 and is 1.79 now. The patient had a biopsy in April with Focal segmental glomerulosclerosis and 40% tubulo-interstitial and global scarring.  Managed on Tacrolimus and prednisone typically.   - appreciate Nephrology recommendations and input  - restarted tacrolimus per nephrology 3mg PO BID. Recheck level tomorrow and Sunday per Nephrology call if level>6.0  - restart cinacalcet today now that she is able to take PO  - continue prednisone PTA dose   - Transplant coordinator, Porsha Silva ([phone] M-W, [phone] Th-F), updated by Intensivist     Hypertension: Well managed on labetolol, and clonidine in the outpatient setting. Her BP has been well managed during her hospitalization, currently 140s/80s, HR 80s. Her home meds have been restarted.   - continue PTA labetolol and clonidine  - labetolol IV PRN       Obstructive Sleep Apnea: CPAP depedent   - RT consult to help set up CPAP   - use CPAP on home settings at night     GERD: Managed on Pepcid PO at home.  - Restart pepcid PO  - advance diet as tolerated     Hypothyroidism: TSH 0.77 on 11/13/17  - restart levothyroxine at PTA dose     Diabetes Type II: Complicated by severe diabetic neuropathy and arthropathy (Charcot foot), chronic pain. Most recent HgbA1C 7.7.  - transition from insulin drip to medium sliding scale  -will increase lantus PTA dose to 24 today   Added novolog 7units TID with meals      DVT Prophylaxis: Heparin SQ  Code Status: Full Code     Disposition: Expected discharge I tomorrow if remains stable          Janki Marin MD  [phone] (P)      Interval History    Pleasantly confused and paranoid at times. Cr stable. D/jose Iv antibiotics per ID. Continued fluconazole for now   -Data reviewed today: I reviewed all new labs and imaging results over the last 24 hours. I personally reviewed no images or EKG's today.    Physical Exam   Temp: 96.8  F (36  C) Temp src: Oral BP: 151/59 Pulse: 76 Heart Rate: 89 Resp: 18 SpO2: 98 % O2 Device: BiPAP/CPAP    Vitals:    11/15/17 0600 11/16/17 0400 11/17/17 0430   Weight: 102.9 kg (226 lb 13.7 oz) 101.8 kg (224 lb 6.9 oz) 101.7 kg (224 lb  1.6 oz)     Vital Signs with Ranges  Temp:  [96.8  F (36  C)-98.2  F (36.8  C)] 96.8  F (36  C)  Pulse:  [76] 76  Heart Rate:  [69-89] 89  Resp:  [16-18] 18  BP: (117-189)/(49-69) 151/59  SpO2:  [95 %-98 %] 98 %  I/O last 3 completed shifts:  In: 360 [P.O.:360]  Out: 1700 [Urine:1700]    Constitutional: Awake, alert, cooperative, no apparent distress  Respiratory: Clear to auscultation bilaterally, no crackles or wheezing  Cardiovascular: Regular rate and rhythm, normal S1 and S2, and no murmur noted  GI: Normal bowel sounds, soft, non-distended, non-tender  Skin/Integumen: No rashes, no cyanosis, no edema  Other:     Medications     - MEDICATION INSTRUCTIONS -       IV fluid REPLACEMENT ONLY         insulin aspart  7 Units Subcutaneous TID w/meals     famotidine  20 mg Oral Daily     tacrolimus  3 mg Oral BID     [START ON 11/19/2017] cinacalcet  30 mg Oral Every Other Day     insulin glargine  12 Units Subcutaneous At Bedtime     insulin aspart  1-7 Units Subcutaneous TID AC     insulin aspart  1-5 Units Subcutaneous At Bedtime     labetalol  100 mg Oral BID     gabapentin  900 mg Oral BID     OXcarbazepine  300 mg Oral BID     influenza Vac Split High-Dose  0.5 mL Intramuscular Prior to discharge     predniSONE  5 mg Oral Daily     heparin  5,000 Units Subcutaneous Q8H     aspirin  81 mg Oral Daily     cholecalciferol  4,000 Units Oral BID     cloNIDine  0.1 mg Oral BID     levothyroxine  50 mcg Oral Daily       Data     Recent Labs  Lab 11/18/17  0818 11/17/17  0708 11/16/17  0525 11/15/17  0505  11/13/17  1613   WBC  --  7.2 7.4 7.1  < > 6.0   HGB  --  10.9* 10.9* 11.7  < > 11.3*   MCV  --  94 93 95  < > 95   PLT  --  162 164 154  < > 154   NA  --  137 138 139  < > 137   POTASSIUM  --  4.1 3.3* 3.8  < > 4.2   CHLORIDE  --  108 107 107  < > 103   CO2  --  19* 22 22  < > 25   BUN  --  42* 43* 44*  < > 47*   CR 1.62* 1.69* 1.79* 1.87*  < > 2.06*   ANIONGAP  --  10 9 10  < > 9   ROGER  --  8.0* 7.7* 7.9*  < > 7.4*    GLC  --  120* 109* 156*  < > 294*   ALBUMIN  --   --  2.7* 2.8*  --  3.0*   PROTTOTAL  --   --  7.0 7.4  --  7.7   BILITOTAL  --   --  0.3 0.3  --  0.2   ALKPHOS  --   --  64 70  --  67   ALT  --   --  11 11  --  11   AST  --   --  13 12  --  8   TROPI  --   --   --   --   --  <0.015   < > = values in this interval not displayed.    No results found for this or any previous visit (from the past 24 hour(s)).

## 2017-11-19 VITALS
DIASTOLIC BLOOD PRESSURE: 48 MMHG | TEMPERATURE: 97.4 F | HEART RATE: 76 BPM | HEIGHT: 66 IN | SYSTOLIC BLOOD PRESSURE: 127 MMHG | WEIGHT: 224.1 LBS | RESPIRATION RATE: 16 BRPM | OXYGEN SATURATION: 99 % | BODY MASS INDEX: 36.02 KG/M2

## 2017-11-19 LAB
BACTERIA SPEC CULT: NO GROWTH
BACTERIA SPEC CULT: NO GROWTH
CREAT SERPL-MCNC: 1.79 MG/DL (ref 0.52–1.04)
GFR SERPL CREATININE-BSD FRML MDRD: 28 ML/MIN/1.7M2
GLUCOSE BLDC GLUCOMTR-MCNC: 117 MG/DL (ref 70–99)
GLUCOSE BLDC GLUCOMTR-MCNC: 137 MG/DL (ref 70–99)
GLUCOSE BLDC GLUCOMTR-MCNC: 150 MG/DL (ref 70–99)
Lab: NORMAL
Lab: NORMAL
SPECIMEN SOURCE: NORMAL
SPECIMEN SOURCE: NORMAL
TACROLIMUS BLD-MCNC: 12.9 UG/L (ref 5–15)
TACROLIMUS BLD-MCNC: 15.9 UG/L (ref 5–15)
TME LAST DOSE: ABNORMAL H
TME LAST DOSE: NORMAL H

## 2017-11-19 PROCEDURE — A9270 NON-COVERED ITEM OR SERVICE: HCPCS | Mod: GY | Performed by: INTERNAL MEDICINE

## 2017-11-19 PROCEDURE — 40000275 ZZH STATISTIC RCP TIME EA 10 MIN

## 2017-11-19 PROCEDURE — 25000128 H RX IP 250 OP 636: Performed by: INTERNAL MEDICINE

## 2017-11-19 PROCEDURE — 94660 CPAP INITIATION&MGMT: CPT

## 2017-11-19 PROCEDURE — 00000146 ZZHCL STATISTIC GLUCOSE BY METER IP

## 2017-11-19 PROCEDURE — A9270 NON-COVERED ITEM OR SERVICE: HCPCS | Mod: GY | Performed by: NURSE PRACTITIONER

## 2017-11-19 PROCEDURE — 25000132 ZZH RX MED GY IP 250 OP 250 PS 637: Mod: GY | Performed by: NURSE PRACTITIONER

## 2017-11-19 PROCEDURE — 36415 COLL VENOUS BLD VENIPUNCTURE: CPT | Performed by: SURGERY

## 2017-11-19 PROCEDURE — 25000132 ZZH RX MED GY IP 250 OP 250 PS 637: Mod: GY | Performed by: INTERNAL MEDICINE

## 2017-11-19 PROCEDURE — 93005 ELECTROCARDIOGRAM TRACING: CPT

## 2017-11-19 PROCEDURE — 80197 ASSAY OF TACROLIMUS: CPT | Performed by: SURGERY

## 2017-11-19 PROCEDURE — 82565 ASSAY OF CREATININE: CPT | Performed by: INTERNAL MEDICINE

## 2017-11-19 PROCEDURE — 93010 ELECTROCARDIOGRAM REPORT: CPT | Performed by: INTERNAL MEDICINE

## 2017-11-19 PROCEDURE — 25000131 ZZH RX MED GY IP 250 OP 636 PS 637: Mod: GY | Performed by: INTERNAL MEDICINE

## 2017-11-19 PROCEDURE — 99239 HOSP IP/OBS DSCHRG MGMT >30: CPT | Performed by: INTERNAL MEDICINE

## 2017-11-19 PROCEDURE — 25000125 ZZHC RX 250: Performed by: INTERNAL MEDICINE

## 2017-11-19 RX ORDER — DIPHENOXYLATE HCL/ATROPINE 2.5-.025MG
1 TABLET ORAL ONCE
Status: COMPLETED | OUTPATIENT
Start: 2017-11-19 | End: 2017-11-19

## 2017-11-19 RX ORDER — DIPHENHYDRAMINE HCL 25 MG
25 CAPSULE ORAL ONCE
Status: COMPLETED | OUTPATIENT
Start: 2017-11-19 | End: 2017-11-19

## 2017-11-19 RX ORDER — TACROLIMUS 1 MG/1
2 CAPSULE ORAL EVERY EVENING
Qty: 180 CAPSULE | Refills: 3 | Status: ON HOLD | OUTPATIENT
Start: 2017-11-19 | End: 2017-12-25

## 2017-11-19 RX ORDER — DIPHENOXYLATE HCL/ATROPINE 2.5-.025MG
2 TABLET ORAL 4 TIMES DAILY PRN
Qty: 30 TABLET | Refills: 0 | Status: SHIPPED | OUTPATIENT
Start: 2017-11-19 | End: 2017-12-19

## 2017-11-19 RX ADMIN — HEPARIN SODIUM 5000 UNITS: 5000 INJECTION, SOLUTION INTRAVENOUS; SUBCUTANEOUS at 12:23

## 2017-11-19 RX ADMIN — INSULIN ASPART 1 UNITS: 100 INJECTION, SOLUTION INTRAVENOUS; SUBCUTANEOUS at 09:22

## 2017-11-19 RX ADMIN — ASPIRIN 81 MG 81 MG: 81 TABLET ORAL at 09:23

## 2017-11-19 RX ADMIN — HEPARIN SODIUM 5000 UNITS: 5000 INJECTION, SOLUTION INTRAVENOUS; SUBCUTANEOUS at 04:52

## 2017-11-19 RX ADMIN — PREDNISONE 5 MG: 5 TABLET ORAL at 09:23

## 2017-11-19 RX ADMIN — DIPHENOXYLATE HYDROCHLORIDE AND ATROPINE SULFATE 1 TABLET: 2.5; .025 TABLET ORAL at 12:23

## 2017-11-19 RX ADMIN — OXCARBAZEPINE 300 MG: 300 TABLET, FILM COATED ORAL at 09:23

## 2017-11-19 RX ADMIN — CINACALCET HYDROCHLORIDE 30 MG: 30 TABLET, COATED ORAL at 09:23

## 2017-11-19 RX ADMIN — LEVOTHYROXINE SODIUM 50 MCG: 50 TABLET ORAL at 09:23

## 2017-11-19 RX ADMIN — DIPHENHYDRAMINE HYDROCHLORIDE 25 MG: 25 CAPSULE ORAL at 09:23

## 2017-11-19 RX ADMIN — GABAPENTIN 900 MG: 300 CAPSULE ORAL at 09:23

## 2017-11-19 RX ADMIN — TACROLIMUS 3 MG: 1 CAPSULE ORAL at 09:22

## 2017-11-19 RX ADMIN — LABETALOL HYDROCHLORIDE 100 MG: 100 TABLET, FILM COATED ORAL at 09:23

## 2017-11-19 RX ADMIN — VITAMIN D, TAB 1000IU (100/BT) 4000 UNITS: 25 TAB at 09:23

## 2017-11-19 RX ADMIN — CLONIDINE HYDROCHLORIDE 0.1 MG: 0.1 TABLET ORAL at 09:23

## 2017-11-19 NOTE — PROVIDER NOTIFICATION
Prescriber Notification Note    The pharmacist has communicated with this patient's provider regarding a concern or therapy recommendation.    Notified Person: Dr. Carson  Date/Time of Notification: 1100 11/19  Interaction: phone  Concern/Recommendation:Tacrolimus level resulted this AM from AM draw 11/18 = 12.9. Pt is discharging today and Dr. Marin would like recommendation for Tacrolimus dosing. Per Dr. Carson have pt decrease dose to 2 mg this PM and follow up with transplant center in the AM as todays level drawn this AM should be available at that time for them to recommend further dosing.      Comments/Additional Details: Dr. Carson's recommendations discussed with Dr. Marin

## 2017-11-19 NOTE — PROGRESS NOTES
[facility]  Infectious Disease Progress Note          Assessment and Plan:   IMPRESSION:   1.  A 71-year-old female well known to us from prior admissions, admitted with mental status changes, respiratory failure and probable sepsis, by history most likely urosepsis, although not entirely clear.   2.  Acute respiratory failure, not clear pneumonia, probably related to sepsis.   3.  Recent urinary tract infection, apparently has been on Cipro.   4.  Recurrent urinary tract infections, chronic Johnson catheter in place, has had vancomycin-resistant enterococcus and extended spectrum beta lactamase in the past.   5.  Diabetes mellitus.   6.  Prior renal transplant, on chronic immunosuppressant therapy without major immunosuppressing infections, renal function slightly abnormal, but stable.   7.  Sulfa allergy and listed quinolone allergy, apparently just got quinolone and does not have a true quinolone allergy, but has had reproducible neurologic side-effects in the past.  I think we successfully re-challenged her with Cipro at one time in the past and apparently again now.        RECOMMENDATIONS:   1. Cxs wo sig +. procal low, no fever, WBC nl, ? No infection as issue  2.  DC meropenem no further conventional antibiotics, 11/6 UTI org adequately treated and no other +. Doubt candiduria significant , got short course flucon , off now due to drug interaction  3.  ? cipro related mental status changes as actual dx, had prior reproducible(seen by me) true quinolone related neuro issues  4 Watch off antimicrobials, near baseline, agree with disposition        Interval History:   extubated and doing better; no cp, sob, n/v/d, or abd pain. cx only candida in cath UC, recent + UC fairly R citrobacter  BC neg procal low WBC nl  Mental status now ? at baseline(pt known to me)              Medications:       insulin aspart  7 Units Subcutaneous TID w/meals     insulin glargine  24 Units Subcutaneous At  "Bedtime     famotidine  20 mg Oral Daily     tacrolimus  3 mg Oral BID     cinacalcet  30 mg Oral Every Other Day     insulin aspart  1-7 Units Subcutaneous TID AC     insulin aspart  1-5 Units Subcutaneous At Bedtime     labetalol  100 mg Oral BID     gabapentin  900 mg Oral BID     OXcarbazepine  300 mg Oral BID     influenza Vac Split High-Dose  0.5 mL Intramuscular Prior to discharge     predniSONE  5 mg Oral Daily     heparin  5,000 Units Subcutaneous Q8H     aspirin  81 mg Oral Daily     cholecalciferol  4,000 Units Oral BID     cloNIDine  0.1 mg Oral BID     levothyroxine  50 mcg Oral Daily                  Physical Exam:   Blood pressure 148/51, pulse 76, temperature 97.5  F (36.4  C), temperature source Oral, resp. rate 16, height 1.676 m (5' 5.98\"), weight 101.7 kg (224 lb 1.6 oz), SpO2 95 %, not currently breastfeeding.  Wt Readings from Last 2 Encounters:   11/17/17 101.7 kg (224 lb 1.6 oz)   11/08/17 103.5 kg (228 lb 2.8 oz)     Vital Signs with Ranges  Temp:  [95.8  F (35.4  C)-97.5  F (36.4  C)] 97.5  F (36.4  C)  Heart Rate:  [61-74] 70  Resp:  [16-18] 16  BP: (105-153)/(31-55) 148/51  SpO2:  [95 %-100 %] 95 %    Constitutional: extubated, no apparent distress   Lungs: Clear to auscultation bilaterally, no crackles or wheezing off O2    Cardiovascular: Regular rate and rhythm, normal S1 and S2, and no murmur noted   Abdomen: Normal bowel sounds, soft, non-distended, non-tender   Skin: No rashes, no cyanosis,some edema   Other: Mental status off but near baseline          Data:   All microbiology laboratory data reviewed.  Recent Labs   Lab Test  11/17/17   0708  11/16/17   0525  11/15/17   0505   WBC  7.2  7.4  7.1   HGB  10.9*  10.9*  11.7   HCT  33.5*  33.5*  37.0   MCV  94  93  95   PLT  162  164  154     Recent Labs   Lab Test  11/18/17   0818  11/17/17   0708  11/16/17   0525   CR  1.62*  1.69*  1.79*     Recent Labs   Lab Test  05/25/16   2116   SED  53*     Recent Labs   Lab Test  11/13/17   " 1714  11/13/17   1646  11/13/17   1613  11/06/17   1802  11/06/17   1720  11/06/17   1652  10/27/17   1425  10/16/17   1445  07/11/17   1400   CULT  No growth  >100,000 colonies/mL  Candida albicans / dubliniensis  Candida albicans and Candida dubliniensis are not routinely speciated  Susceptibility testing not routinely done  *  No growth  >100,000 colonies/mL  Citrobacter freundii complex  *  No growth  No growth  50,000 to 100,000 colonies/mL  Candida albicans / dubliniensis  Candida albicans and Candida dubliniensis are not routinely speciated  *  >100,000 colonies/mL  Escherichia coli  *  >100,000 colonies/mL  Klebsiella pneumoniae  *  <10,000 colonies/mL  Strain 2  Klebsiella pneumoniae  *  10,000 to 50,000 colonies/mL Enterococcus faecalis  <10,000 colonies/mL Staphylococcus lugdunensis  *

## 2017-11-19 NOTE — DISCHARGE SUMMARY
[facility]  Discharge Summary        Gem Jade MRN# [id number]   YOB: 1945 Age: 72 year old     Date of Admission:  11/13/2017  Date of Discharge:  11/19/2017  Admitting Physician:  Robin Velasquez MD  Discharge Physician: Janki Marin MD  Discharging Service: Hospitalist     Primary Provider: Marivel Barcenas  Primary Care Physician Phone Number: [phone]         Discharge Diagnoses/Problem Oriented Hospital Course (Providers):    Gem Jade was admitted on 11/13/2017 by Robin Velasquez MD and I would refer you to their history and physical.  The following problems were addressed during her hospitalization:  Gem Jade is a 71 year old female who was admitted on 11/13/2017.     Gem Jade is a 71 year old female who was admitted on 11/13/2017 with altered mental status related to UTI. She has a PMH of kidney transplant, neurogenic bladder with chronic indwelling catheter and recurrent UTIs.       Recurrent Sepsis secondary to complicated UTI secondary to indwelling rico catheter (candida albicans):  History of neurogenic bladder and indwelling rico catheter. She has frequent UTIs. UC this admission grew candida. She recently had another UTI with positive UC on 11/6/17 - grew Citrobacter freundii complex. She has a history of klebsiella, ESBL, VRE.  Indwelling rico changed this admission.   - d/jose diflucan on 11/17 as tacrolimus and fluconazole can cause QT prolongation and EKG showed prolonged QT at 486 worse from 475 few days prior   Placed on tele monitoring   Repeat EKG today after d/cing fluconazole showed QTc improved to 486 to  448  Candida in urine could be from colonization per ID   - d/jose meropenem and fluconazole  per ID   Monitored off antimicrobials. Doing well       Hypokalemia:   Improved       Acute Respiratory Failure secondary to Urosepsis/AMS: The patient was initially brought in the ED with a GCS 5-6, intubated for  airway protection. She was successfully extubated on 11/15/17. She has been weaned to room air, following commands but mentation remains variable. Per the patient's , she often has issues following a UTI with confusion and paranoia.   - continue RT support for  to set up home CPAP and pulmonary toilet   - delirium prevention  Stable. Improved       Renal Transplant with Immunosuppression: LDKT with allograft serum. Transplant in 1999. Baseline serum creatinine 0.9-2.0 and is 1.79 now. The patient had a biopsy in April with Focal segmental glomerulosclerosis and 40% tubulo-interstitial and global scarring. Managed on Tacrolimus and prednisone typically.   - appreciate Nephrology recommendations and input  - restarted tacrolimus per nephrology 3mg PO BID. Tacrolimus level returned 12.9 toay discussed with Nephrology as pt was on tacrolimus drip for few days and fluconazole could have caused the high levels. So they reduced the dose to 2mg tonight and pt to call the transplant office tomorrow re further dosing. Called  and updated him RE this   - restarted cinacalcet   - continue prednisone PTA dose   - Transplant coordinator, Porsha Silva ([phone] M-W, [phone] -F), updated by Intensivist      Hypertension: Well managed on labetolol, and clonidine in the outpatient setting. Her BP has been well managed during her hospitalization, currently 140s/80s, HR 80s. Her home meds have been restarted.   - continue PTA labetolol and clonidine  - labetolol IV PRN         Obstructive Sleep Apnea: CPAP depedent   - RT consult to help set up CPAP   - use CPAP on home settings at night      GERD: Managed on Pepcid PO at home.  - Restart pepcid PO  - advance diet as tolerated      Hypothyroidism: TSH 0.77 on 11/13/17  - restart levothyroxine at PTA dose      Diabetes Type II: Complicated by severe diabetic neuropathy and arthropathy (Charcot foot), chronic pain. Most recent HgbA1C 7.7.  - transition from  insulin drip to medium sliding scale  -will increase lantus PTA dose to 24 today   Added novolog 7units TID with meals. PTA regimen restarted on discharge       DVT Prophylaxis: Heparin SQ  Code Status: Full Code      Disposition: home today             Janki Marin MD  [phone] (P)              Code Status:      Full Code        Brief Hospital Stay Summary Sent Home With Patient in AVS:               Important Results:      See below         Pending Results:        Unresulted Labs Ordered in the Past 30 Days of this Admission     Date and Time Order Name Status Description    11/19/2017 0200 Tacrolimus level In process             Discharge Instructions and Follow-Up:      Follow-up Appointments     Follow-up and recommended labs and tests        Follow up with primary care provider, Marivel Barcenas NP,   within 7 days for hospital follow- up.  No follow up labs or test are   needed.                      Discharge Disposition:      Discharged to home        Discharge Medications:        Current Discharge Medication List      START taking these medications    Details   !! diphenoxylate-atropine (LOMOTIL) 2.5-0.025 MG per tablet Take 2 tablets by mouth 4 times daily as needed for diarrhea  Qty: 30 tablet, Refills: 0    Associated Diagnoses: Diarrhea, unspecified type       !! - Potential duplicate medications found. Please discuss with provider.      CONTINUE these medications which have NOT CHANGED    Details   !! diphenoxylate-atropine (LOMOTIL) 2.5-0.025 MG per tablet Take 1 tablet by mouth 4 times daily as needed for diarrhea  Qty: 30 tablet, Refills: 0    Associated Diagnoses: Diarrhea of presumed infectious origin      meclizine (ANTIVERT) 12.5 MG tablet Take 1 tablet (12.5 mg) by mouth 3 times daily as needed for dizziness  Qty: 30 tablet, Refills: 0    Associated Diagnoses: Disorientation      gabapentin (NEURONTIN) 300 MG capsule Take 3 capsules (900 mg) by mouth 2 times daily For pain  Qty:  180 capsule, Refills: 3    Associated Diagnoses: Diabetic polyneuropathy associated with type 2 diabetes mellitus (H)      tacrolimus (GENERIC EQUIVALENT) 1 MG capsule Take 3 capsules (3 mg) by mouth 2 times daily (total dose 3.5 mg twice daily)  Qty: 180 capsule, Refills: 3    Associated Diagnoses: Kidney transplant recipient; Long-term use of immunosuppressant medication      tacrolimus (GENERIC EQUIVALENT) 0.5 MG capsule Take 1 capsule (0.5 mg) by mouth 2 times daily (total dose 3.5 mg twice daily)  Qty: 60 capsule, Refills: 3    Associated Diagnoses: Kidney transplant recipient      cloNIDine (CATAPRES) 0.1 MG tablet TAKE ONE TABLET BY MOUTH TWICE A DAY  Qty: 180 tablet, Refills: 3    Associated Diagnoses: HTN, goal below 140/90      Cholecalciferol (VITAMIN D) 2000 UNITS tablet Take 4,000 Units by mouth 2 times daily  Qty: 100 tablet, Refills: 3    Associated Diagnoses: Vitamin deficiency      predniSONE (DELTASONE) 5 MG tablet Take 1 tablet (5 mg) by mouth daily  Qty: 90 tablet, Refills: 3    Associated Diagnoses: Kidney replaced by transplant      insulin glargine (LANTUS SOLOSTAR) 100 UNIT/ML injection Inject 24 units under the skin daily.  Qty: 15 mL, Refills: 11    Comments: Hold on file for refills  Associated Diagnoses: Type 2 diabetes mellitus with diabetic polyneuropathy, with long-term current use of insulin (H)      insulin aspart (NOVOLOG FLEXPEN) 100 UNIT/ML injection Inject 10 units under the skin 2 times daily before meals as directed. Add sliding scale as needed. Avg use 25 units per day  Qty: 15 mL, Refills: 11    Associated Diagnoses: Type 2 diabetes mellitus with diabetic polyneuropathy, with long-term current use of insulin (H)      lidocaine (XYLOCAINE) 2 % topical gel Apply topically as needed for moderate pain 10 ML every 3 weeks  Qty: 30 mL, Refills: 11    Associated Diagnoses: Johnson catheter in place      labetalol (NORMODYNE) 300 MG tablet Take 150 mg by mouth 2 times daily Take 1/2  tablet (150mg) =300mg. Hold for systolic BP less than 120.  Qty: 120 tablet, Refills: 3    Associated Diagnoses: Benign essential hypertension; Kidney replaced by transplant      loratadine (CLARITIN) 10 MG tablet Take 1 tablet (10 mg) by mouth daily  Qty: 90 tablet, Refills: 3    Associated Diagnoses: Acute nasopharyngitis      pravastatin (PRAVACHOL) 10 MG tablet TAKE ONE TABLET BY MOUTH EVERY DAY  Qty: 90 tablet, Refills: 3    Associated Diagnoses: Hyperlipidemia LDL goal <100      OXcarbazepine (TRILEPTAL) 300 MG tablet Take 1 tablet (300 mg) by mouth 2 times daily For pain  Qty: 180 tablet, Refills: 3    Associated Diagnoses: Diabetic polyneuropathy associated with diabetes mellitus due to underlying condition (H)      aspirin 81 MG chewable tablet Take 1 tablet (81 mg) by mouth daily Starting 1 week after your kidney biopsy  Qty: 90 tablet, Refills: 3    Associated Diagnoses: Pulmonary embolism and infarction (H); Diabetes mellitus with background retinopathy (H)      cinacalcet (SENSIPAR) 30 MG tablet Take 1 tablet (30 mg) by mouth daily  Qty: 60 tablet, Refills: 3    Associated Diagnoses: Kidney replaced by transplant; Hypercalcemia      cyanocobalamin (VITAMIN B12) 1000 MCG/ML injection Inject 1 mL (1,000 mcg) into the muscle every 30 days  Qty: 3 mL, Refills: 3    Associated Diagnoses: Iron deficiency anemia, unspecified iron deficiency anemia type      TraMADol HCl 50 MG TBDP Take 50 mg by mouth 3 times daily as needed  Qty: 90 tablet, Refills: 3    Associated Diagnoses: Chronic shoulder pain, unspecified laterality      levothyroxine (SYNTHROID) 50 MCG tablet Take 1 tablet (50 mcg) by mouth daily  Qty: 90 tablet, Refills: 3    Associated Diagnoses: Other specified hypothyroidism      ondansetron (ZOFRAN ODT) 4 MG disintegrating tablet Take 1-2 tablets (4-8 mg) by mouth every 8 hours as needed for nausea  Qty: 20 tablet, Refills: 5    Associated Diagnoses: Nausea      simethicone (MYLICON) 125 MG CHEW  Take 1 tablet (125 mg) by mouth as needed for intestinal gas  Qty: 120 tablet      Lactobacillus Rhamnosus, GG, (CULTURELL) capsule Take 1 capsule by mouth 2 times daily  Qty: 60 capsule, Refills: 11    Associated Diagnoses: Diarrhea      Cranberry 400 MG TABS Take 400 mg by mouth 2 times daily      famotidine (PEPCID) 20 MG tablet Take 1 tablet (20 mg) by mouth 2 times daily  Qty: 60 tablet, Refills: 1      ACETAMINOPHEN PO Take 325-650 mg by mouth every 4 hours as needed.      !! order for DME High back guerrero wheel chair  MICHELLE 99  DX: morbid obesity, generalized weakness, physical deconditioning, chronic vertigo  Qty: 1 Device, Refills: 0    Associated Diagnoses: Morbid obesity, unspecified obesity type (H); Vertigo; Generalized muscle weakness; Physical deconditioning      blood glucose monitoring (ACCU-CHEK COMPACT PLUS) test strip Use to test blood sugar 3 times daily or as directed.  Ok to substitute alternative if insurance prefers.  Qty: 100 strip, Refills: prn      !! order for DME Equipment being ordered: Silver Impregnated catheters  HX of frequent UTI  Qty: 12 each, Refills: 1    Associated Diagnoses: Recurrent UTI      !! order for DME Equipment being ordered: Ahuja FX CPAP interface (nasal pillows), size XS.  Qty: 1 each, Refills: 1    Associated Diagnoses: Obstructive sleep apnea syndrome      insulin pen needle 30G X 8 MM Use as directed with Lantus  Qty: 100 each, Refills: 3    Associated Diagnoses: Type 2 diabetes mellitus with diabetic nephropathy (H)      !! order for DME Equipment being ordered: Blue Chux  [facility]  Qty: 100 pad, Refills: 3    Associated Diagnoses: Fecal incontinence      !! ORDER FOR DME Equipment being ordered: Johnson Catheters - 18 french  Qty: 3 catheter, Refills: PRN    Associated Diagnoses: Johnson catheter status       !! - Potential duplicate medications found. Please discuss with provider.      STOP taking these medications       ciprofloxacin (CIPRO)  "500 MG tablet Comments:   Reason for Stopping:                 Allergies:         Allergies   Allergen Reactions     Bactrim Ds [Sulfamethoxazole W/Trimethoprim]      Creatinine level increased     Atorvastatin Calcium      myalgias, muscle aches     Codeine Nausea and Vomiting     Darvocet [Propoxyphene N-Apap] Nausea and Vomiting     Hydromorphone      Levofloxacin Other (See Comments) and Diarrhea     hallucinations     Morphine      Nausea and vomiting     Niaspan [Niacin] GI Disturbance     Flushing even at low dose, GI upset     Percocet [Oxycodone-Acetaminophen]      Vomiting after taking med couple of times     Vicodin [Hydrocodone-Acetaminophen] Nausea and Vomiting           Consultations This Hospital Stay:      Consultation during this admission received from infectious disease and internal medicine        Condition and Physical on Discharge:      Discharge condition: Stable   Vitals: Blood pressure 148/51, pulse 76, temperature 97.5  F (36.4  C), temperature source Oral, resp. rate 16, height 1.676 m (5' 5.98\"), weight 101.7 kg (224 lb 1.6 oz), SpO2 95 %, not currently breastfeeding.     Constitutional: Alert and awake    Lungs: CTA   Cardiovascular: RRR   Abdomen: Soft, NT,ND, BS+   Skin: Warm and dry    Other:          Discharge Time:      Greater than 30 minutes.        Image Results From This Hospital Stay (For Non-EPIC Providers):        Results for orders placed or performed during the hospital encounter of 11/13/17   XR Chest Port 1 View    Narrative    PORTABLE CHEST ONE VIEW  11/13/2017 4:29 PM     HISTORY: Shortness of breath and altered mental status.      COMPARISON: Chest x-ray 11/6/2017.      Impression    IMPRESSION: Portable view of the chest is performed in the supine  position. Lungs are clear but hypoaerated. Heart is enlarged but  unchanged. Aorta is calcified and tortuous. No obvious pneumothorax on  this supine view. No definite pleural effusions.    THEA MAJOR MD   CT Head w/o " Contrast    Narrative    CT SCAN OF THE HEAD WITHOUT CONTRAST  11/13/2017  4:43 PM     HISTORY: Altered mental status and brandie-arrest.    TECHNIQUE: Axial images of the head and coronal reformations without  IV contrast material. Radiation dose for this scan was reduced using  automated exposure control, adjustment of the mA and/or kV according  to patient size, or iterative reconstruction technique.    COMPARISON: 11/6/2017    FINDINGS: There is generalized atrophy of the brain. There is low  attenuation in the white matter of the cerebral hemispheres consistent  with sequelae of small vessel ischemic disease. There is no evidence  of intracranial hemorrhage, mass, acute infarct or anomaly.     The visualized portions of the sinuses and mastoids appear normal.  There is no evidence of trauma.       Impression    IMPRESSION:   1. No acute abnormality.  2. Atrophy of the brain. White matter changes consistent with sequelae  of small vessel ischemic disease. This is unchanged.    ELIZABETH GRIMM MD   Chest  XR, 1 view PORTABLE    Narrative    CHEST PORTABLE ONE VIEW    11/13/2017 7:45 PM     HISTORY: Post intubation.    COMPARISON: Chest x-ray 11/13/2017.      Impression    IMPRESSION:   1. ET tube newly identified, its tip above the abena. Nasogastric  tube is noted.  2. Stable cardiomegaly. Hypoinflated lungs again identified, stable.     GISSEL HARDIN MD   XR Chest Port 1 View    Narrative    CHEST ONE VIEW PORTABLE 11/15/2017 5:53 AM     COMPARISON: Frontal chest x-ray 11/13/2017    HISTORY: Respiratory failure.      Impression    IMPRESSION: Tip of the endotracheal tube remains just below the level  of the clavicular heads. Nasogastric tube again noted.    There is an interval increase in pulmonary vascular prominence and a  slight interval increase in interstitial prominence in both lungs  raising the question of developing pulmonary edema. There is  questionable retrocardiac left lower lung airspace opacity that  may  represent pneumonia. The lungs are otherwise clear. There is no  definite pleural effusion. There is no pneumothorax. Heart size  appears within normal limits.    SHAAN SEVILLA MD     *Note: Due to a large number of results and/or encounters for the requested time period, some results have not been displayed. A complete set of results can be found in Results Review.             Most Recent Lab Results In EPIC (For Non-EPIC Providers):    Most Recent 3 CBC's:  Recent Labs   Lab Test  11/17/17   0708  11/16/17   0525  11/15/17   0505   WBC  7.2  7.4  7.1   HGB  10.9*  10.9*  11.7   MCV  94  93  95   PLT  162  164  154      Most Recent 3 BMP's:  Recent Labs   Lab Test  11/19/17   0745  11/18/17   0818  11/17/17   0708  11/16/17   0525  11/15/17   0505   NA   --    --   137  138  139   POTASSIUM   --    --   4.1  3.3*  3.8   CHLORIDE   --    --   108  107  107   CO2   --    --   19*  22  22   BUN   --    --   42*  43*  44*   CR  1.79*  1.62*  1.69*  1.79*  1.87*   ANIONGAP   --    --   10  9  10   ROGER   --    --   8.0*  7.7*  7.9*   GLC   --    --   120*  109*  156*     Most Recent 3 Troponin's:  Recent Labs   Lab Test  11/13/17   1613  04/19/17   0005  01/10/16   1408   TROPI  <0.015  <0.015  The 99th percentile for upper reference range is 0.045 ug/L.  Troponin values in   the range of 0.045 - 0.120 ug/L may be associated with risks of adverse   clinical events.    <0.015  The 99th percentile for upper reference range is 0.045 ug/L.  Troponin values in   the range of 0.045 - 0.120 ug/L may be associated with risks of adverse   clinical events.       Most Recent 3 INR's:  Recent Labs   Lab Test  11/06/17   1652  04/26/17   2338  04/26/17   0638   INR  1.09  1.03  1.06     Most Recent 2 LFT's:  Recent Labs   Lab Test  11/16/17   0525  11/15/17   0505   AST  13  12   ALT  11  11   ALKPHOS  64  70   BILITOTAL  0.3  0.3     Most Recent Cholesterol Panel:  Recent Labs   Lab Test  12/05/16   1210   CHOL  164   LDL  64    HDL  46*   TRIG  272*     Most Recent 6 Bacteria Isolates From Any Culture (See EPIC Reports for Culture Details):  Recent Labs   Lab Test  11/13/17   1714  11/13/17   1646  11/13/17   1613  11/06/17   1802  11/06/17   1720  11/06/17   1652   CULT  No growth  >100,000 colonies/mL  Candida albicans / dubliniensis  Candida albicans and Candida dubliniensis are not routinely speciated  Susceptibility testing not routinely done  *  No growth  >100,000 colonies/mL  Citrobacter freundii complex  *  No growth  No growth     Most Recent TSH, T4 and HgbA1c:   Recent Labs   Lab Test  11/14/17   0455  11/13/17   1613   03/09/17   1310   TSH   --   0.77   --   1.20   T4   --    --    --   0.72*   A1C  7.7*   --    < >   --     < > = values in this interval not displayed.

## 2017-11-19 NOTE — PLAN OF CARE
Problem: Patient Care Overview  Goal: Plan of Care/Patient Progress Review  Outcome: No Change  Pt is alert, disoriented to time. Calm and cooperative this shift. Denies pain. VSS. Wearing CPAP at night. Johnson patent draining adequate amounts of urine. Incontinet of BM, none this shift. Turn/repo q 2hr. Pt appeared to rest comfortably overnight. Plan is for possible discharge tomorrow. Will continue to monitor.

## 2017-11-19 NOTE — PROGRESS NOTES
Social Work: Assessment with Discharge Plan    Patient Name: Gem Jade  : 1945  Age: 72 year old  MRN: [id number]  Completed assessment with: Patient    Presenting Information   Reason for Referral: DC Planning  Date of intake: 2017  Referral Source: Physician  Decision Maker: Self  Alternate Decision Maker:    Health Care Directive: Will bring in copy  Living Situation: house with spouse  Previous Functional Status: dependent on care  Patient and family understanding of hospitalization: full understanding  Cultural/Language/Spiritual Considerations: none  Adjustment to Illness: coping appropriatly    Physical Health  Reason for admission:   1. Diarrhea, unspecified type    2. Altered mental status, unspecified altered mental status type    3. Bradypnea    4. Kidney replaced by transplant      Services Needed/Recommended: None- has appropriate services      Support System  Significant Relationship at Present time:    Family of Origin is available for support:   Other support available:  Home care  Current in home services: RN, OT, HHA, [facility], CNP, Pharmacist   Gaps in Support System: none    Provider Information   Primary Care Physician:Marivel Barcenas      [facility]      : unknown    Financial   Income Source: Social Security  Financial Concerns:  none  Insurance: Medicare      Discharge Plan   Patient and family discharge goal: Home with Resumed HC  Provided Education on discharge plan: YES  Patient agreeable to discharge plan:  YES  A list of Medicare Certified Facilities was provided to the patient and/or family to encourage patient choice. Patient's choices for facility are: no  Barriers to discharge: Medical Stability    Discharge Recommendations   Disposition: Home w/HC  Transportation Needs: stretcher transport  Name of Transportation Company and Phone: [employer]    Additional comments   Pt lives  at home with her  and [facility]. Will need to establish new PCP due to complex care team ending.    GENE Mills, [facility]  Phone [phone]

## 2017-11-19 NOTE — PLAN OF CARE
Problem: Patient Care Overview  Goal: Plan of Care/Patient Progress Review  Outcome: Improving    Shift Update: Patient is A&Ox3; disoriented to time. Pt slow to respond. Calm and cooperative this shift. No complaints of pain. VSS. O2 stable on RA. Pt wears CPAP at night. Mepilex on sacral area and interdry in place to groin. Johnson in place draining adequate amount of urine. Incontinent of stool. No BM this shift.  L PIV SL. T&R Q2H. Possible discharge tomorrow.

## 2017-11-19 NOTE — PROGRESS NOTES
Spoke with SEKOU Bartlett caring for patient and this patient has only had one episode of loose stool this am, and no BMs since.  D/C of enteric precautions.  Debbie Sutton, [facility] IP

## 2017-11-19 NOTE — PLAN OF CARE
Problem: Patient Care Overview  Goal: Plan of Care/Patient Progress Review  Outcome: No Change  Pt is alert, disoriented to time. Calm and cooperative this shift. Denies pain. VSS on RA. Johnson patent draining adequate amounts of urine. Incontinet of BM x 1. Turn/repo q 2hr.Tacrolimus 12.9. D/C home today at 1:30.

## 2017-11-19 NOTE — PROGRESS NOTES
Social Work Services Discharge Note        Patient Name:  Gem Jade     Anticipated Discharge Date: 11/19/17     Discharge Disposition:  Home with Spouse     Additional Services/Equipment Arranged: Resumption of home care     Patient / Family response to discharge plan:  Agreement     Persons notified of above discharge plan: Pt    The pt shared that she is ready to return home today, she would like her HC resumed for RN, HHA and OT. The pt shared that she was using the [facility] for her medical care and is very sad that this program is no longer available. The pt has gone to  [facility] in the past worries about trying to get into the clinic. Discussed that she should call [facility] and work with them to find the best way to help maintain her medical care. Otherwise the pt is aware that [facility] does have a home program as well for medical care. Referral sent to [facility] for resumption of care   Transport was arranged for 13:30 via [employer] stretcher.     GENE Mills, [facility]  Phone [phone]

## 2017-11-20 ENCOUNTER — TELEPHONE (OUTPATIENT)
Dept: FAMILY MEDICINE | Facility: CLINIC | Age: 72
End: 2017-11-20

## 2017-11-20 NOTE — TELEPHONE ENCOUNTER
Connected with [facility] to get update on moving forward with transition.  She is still waiting to hear back from her administration.    Routing to provider as DENNIS.    Wendie Harris RN

## 2017-11-20 NOTE — TELEPHONE ENCOUNTER
Connected with Alexus who stated they are working on a room to accomodate the patient during her visits to the clinic.  Per administration and PCS they would like current PCP to see patient as scheduled on 12/6/17.  Alexus will plan to contact current PCP and consult with her and then have PCP make a call to Dr Carlos to give an update on the patient.    Routing to provider as VICENTE Harris RN

## 2017-11-21 ENCOUNTER — OFFICE VISIT (OUTPATIENT)
Dept: FAMILY MEDICINE | Facility: CLINIC | Age: 72
End: 2017-11-21
Payer: COMMERCIAL

## 2017-11-21 ENCOUNTER — TELEPHONE (OUTPATIENT)
Dept: FAMILY MEDICINE | Facility: CLINIC | Age: 72
End: 2017-11-21

## 2017-11-21 ENCOUNTER — OFFICE VISIT (OUTPATIENT)
Dept: PHARMACY | Facility: CLINIC | Age: 72
End: 2017-11-21
Payer: COMMERCIAL

## 2017-11-21 ENCOUNTER — CARE COORDINATION (OUTPATIENT)
Dept: CARE COORDINATION | Facility: CLINIC | Age: 72
End: 2017-11-21

## 2017-11-21 VITALS — SYSTOLIC BLOOD PRESSURE: 130 MMHG | OXYGEN SATURATION: 96 % | HEART RATE: 60 BPM | DIASTOLIC BLOOD PRESSURE: 70 MMHG

## 2017-11-21 DIAGNOSIS — R41.82 ALTERED MENTAL STATUS, UNSPECIFIED ALTERED MENTAL STATUS TYPE: ICD-10-CM

## 2017-11-21 DIAGNOSIS — E11.42 DIABETIC POLYNEUROPATHY ASSOCIATED WITH TYPE 2 DIABETES MELLITUS (H): ICD-10-CM

## 2017-11-21 DIAGNOSIS — Z94.0 KIDNEY REPLACED BY TRANSPLANT: Primary | ICD-10-CM

## 2017-11-21 DIAGNOSIS — N39.0 RECURRENT UTI: ICD-10-CM

## 2017-11-21 DIAGNOSIS — L29.9 ITCHING: ICD-10-CM

## 2017-11-21 DIAGNOSIS — I10 BENIGN ESSENTIAL HYPERTENSION: ICD-10-CM

## 2017-11-21 LAB
INTERPRETATION ECG - MUSE: NORMAL
INTERPRETATION ECG - MUSE: NORMAL

## 2017-11-21 PROCEDURE — 99606 MTMS BY PHARM EST 15 MIN: CPT | Performed by: PHARMACIST

## 2017-11-21 PROCEDURE — 99607 MTMS BY PHARM ADDL 15 MIN: CPT | Performed by: PHARMACIST

## 2017-11-21 PROCEDURE — 99350 HOME/RES VST EST HIGH MDM 60: CPT | Performed by: NURSE PRACTITIONER

## 2017-11-21 NOTE — MR AVS SNAPSHOT
After Visit Summary   11/21/2017    Gem Jade    MRN: [id number]           Patient Information     Date Of Birth          1945        Visit Information        Provider Department      11/21/2017 3:15 PM Marivel Barcenas APRN CNP [facility]        Today's Diagnoses     Kidney replaced by transplant    -  1    Recurrent UTI        Altered mental status, unspecified altered mental status type        Diabetic polyneuropathy associated with type 2 diabetes mellitus (H)        Benign essential hypertension          Care Instructions    We will repeat your tacrolimus level on Friday and adjust your dose accordingly, we will call you with results    We will continue to work with Nicole/Osmaro, Alexsander and [facility] as possible transition options for you    I will see you again in Dec. For final transition plans           Follow-ups after your visit        Your next 10 appointments already scheduled     Dec 06, 2017  2:00 PM CST   Return Visit with ANTHONY Antonio CNP   [facility] ([facility])    [address] [place] [address] [phone]            Dec 06, 2017  2:00 PM CST   Office Visit with Conchita Toledo [facility] ([facility])    [address] [place] [address] [address] [place] [address] [phone]           Bring a current list of meds and any records pertaining to this visit. For Physicals, please bring immunization records and any forms needing to be filled out. Please arrive 10 minutes early to complete paperwork.              Who to contact     If you have questions or need follow up information about today's clinic visit or your schedule please contact [facility] directly at [phone].  Normal or non-critical lab and imaging results will be communicated to you by MyChart, letter or  phone within 4 business days after the clinic has received the results. If you do not hear from us within 7 days, please contact the clinic through [employer] or phone. If you have a critical or abnormal lab result, we will notify you by phone as soon as possible.  Submit refill requests through [employer] or call your pharmacy and they will forward the refill request to us. Please allow 3 business days for your refill to be completed.          Additional Information About Your Visit        [employer] Information     [employer] gives you secure access to your electronic health record. If you see a primary care provider, you can also send messages to your care team and make appointments. If you have questions, please call your primary care clinic.  If you do not have a primary care provider, please call [phone] and they will assist you.        Care EveryWhere ID     This is your Care EveryWhere ID. This could be used by other organizations to access your [place] medical records  [id number]        Your Vitals Were     Pulse Pulse Oximetry                60 96%           Blood Pressure from Last 3 Encounters:   11/21/17 130/70   11/19/17 127/48   11/16/17 140/88    Weight from Last 3 Encounters:   11/17/17 224 lb 1.6 oz (101.7 kg)   11/08/17 228 lb 2.8 oz (103.5 kg)   04/27/17 199 lb 8 oz (90.5 kg)              Today, you had the following     No orders found for display         Today's Medication Changes          These changes are accurate as of: 11/21/17 11:59 PM.  If you have any questions, ask your nurse or doctor.               These medicines have changed or have updated prescriptions.        Dose/Directions    insulin aspart 100 UNIT/ML injection   Commonly known as:  NovoLOG FLEXPEN   This may have changed:  additional instructions   Used for:  Type 2 diabetes mellitus with diabetic polyneuropathy, with long-term current use of insulin (H)        Inject 10 units under the skin 2 times daily before meals as  directed. Add sliding scale as needed. Avg use 25 units per day   Quantity:  15 mL   Refills:  11                Primary Care Provider Office Phone # Fax #    Marivel ManningKeilyJerome, ANTHONY [facility] [phone]       [address] [facility] [address]  [facility] [address]        Equal Access to Services     CARLOS ENRIQUE GREEN : Hadii aad ku hadasho Soomaali, waaxda luqadaha, qaybta kaalmada adeegyada, waxay idiin hayaan adeeg kharash la'aan . So [facility] [phone].    ATENCIÓN: Si habla español, tiene a peguero disposición servicios gratuitos de asistencia lingüística. Carmine al [phone].    We comply with applicable federal civil rights laws and [place] laws. We do not discriminate on the basis of race, color, national origin, age, disability, sex, sexual orientation, or gender identity.            Thank you!     Thank you for choosing [facility]  for your care. Our goal is always to provide you with excellent care. Hearing back from our patients is one way we can continue to improve our services. Please take a few minutes to complete the written survey that you may receive in the mail after your visit with us. Thank you!             Your Updated Medication List - Protect others around you: Learn how to safely use, store and throw away your medicines at www.disposemymeds.org.          This list is accurate as of: 11/21/17 11:59 PM.  Always use your most recent med list.                   Brand Name Dispense Instructions for use Diagnosis    ACCU-CHEK COMPACT PLUS test strip   Generic drug:  blood glucose monitoring     100 strip    Use to test blood sugar 3 times daily or as directed.  Ok to substitute alternative if insurance prefers.        ACETAMINOPHEN PO      Take 325-650 mg by mouth every 4 hours as needed.        aspirin 81 MG chewable tablet     90 tablet    Take 1 tablet (81 mg) by mouth daily Starting 1 week after your kidney biopsy    Pulmonary embolism and infarction (H), Diabetes mellitus  with background retinopathy (H)       cinacalcet 30 MG tablet    SENSIPAR    60 tablet    Take 1 tablet (30 mg) by mouth daily    Kidney replaced by transplant, Hypercalcemia       cloNIDine 0.1 MG tablet    CATAPRES    180 tablet    TAKE ONE TABLET BY MOUTH TWICE A DAY    HTN, goal below 140/90       Cranberry 400 MG Tabs      Take 400 mg by mouth 2 times daily        cyanocobalamin 1000 MCG/ML injection    VITAMIN B12    3 mL    Inject 1 mL (1,000 mcg) into the muscle every 30 days    Iron deficiency anemia, unspecified iron deficiency anemia type       * diphenoxylate-atropine 2.5-0.025 MG per tablet    LOMOTIL    30 tablet    Take 1 tablet by mouth 4 times daily as needed for diarrhea    Diarrhea of presumed infectious origin       * diphenoxylate-atropine 2.5-0.025 MG per tablet    LOMOTIL    30 tablet    Take 2 tablets by mouth 4 times daily as needed for diarrhea    Diarrhea, unspecified type       famotidine 20 MG tablet    PEPCID    60 tablet    Take 1 tablet (20 mg) by mouth 2 times daily        gabapentin 300 MG capsule    NEURONTIN    180 capsule    Take 3 capsules (900 mg) by mouth 2 times daily For pain    Diabetic polyneuropathy associated with type 2 diabetes mellitus (H)       insulin aspart 100 UNIT/ML injection    NovoLOG FLEXPEN    15 mL    Inject 10 units under the skin 2 times daily before meals as directed. Add sliding scale as needed. Avg use 25 units per day    Type 2 diabetes mellitus with diabetic polyneuropathy, with long-term current use of insulin (H)       insulin glargine 100 UNIT/ML injection    LANTUS SOLOSTAR    15 mL    Inject 24 units under the skin daily.    Type 2 diabetes mellitus with diabetic polyneuropathy, with long-term current use of insulin (H)       insulin pen needle 30G X 8 MM     100 each    Use as directed with Lantus    Type 2 diabetes mellitus with diabetic nephropathy (H)       labetalol 300 MG tablet    NORMODYNE    120 tablet    Take 150 mg by mouth 2 times  daily Take 1/2 tablet (150mg) =300mg. Hold for systolic BP less than 120.    Benign essential hypertension, Kidney replaced by transplant       lactobacillus rhamnosus (GG) capsule     60 capsule    Take 1 capsule by mouth 2 times daily    Diarrhea       levothyroxine 50 MCG tablet    SYNTHROID    90 tablet    Take 1 tablet (50 mcg) by mouth daily    Other specified hypothyroidism       lidocaine 2 % topical gel    XYLOCAINE    30 mL    Apply topically as needed for moderate pain 10 ML every 3 weeks    Johnson catheter in place       loratadine 10 MG tablet    CLARITIN    90 tablet    Take 1 tablet (10 mg) by mouth daily    Acute nasopharyngitis       meclizine 12.5 MG tablet    ANTIVERT    30 tablet    Take 1 tablet (12.5 mg) by mouth 3 times daily as needed for dizziness    Disorientation       ondansetron 4 MG ODT tab    ZOFRAN ODT    20 tablet    Take 1-2 tablets (4-8 mg) by mouth every 8 hours as needed for nausea    Nausea       order for DME     3 catheter    Equipment being ordered: Johnson Catheters - 18 french    Johnson catheter status       * order for DME     100 pad    Equipment being ordered: Blue Chux [facility] send to [facility]    Fecal incontinence       * order for DME     1 each    Equipment being ordered: [employer] FX CPAP interface (nasal pillows), size XS.    Obstructive sleep apnea syndrome       * order for DME     12 each    Equipment being ordered: Silver Impregnated catheters HX of frequent UTI    Recurrent UTI       * order for DME     1 Device    High back guerrero wheel chair MICHELLE 99 DX: morbid obesity, generalized weakness, physical deconditioning, chronic vertigo    Morbid obesity, unspecified obesity type (H), Vertigo, Generalized muscle weakness, Physical deconditioning       pravastatin 10 MG tablet    PRAVACHOL    90 tablet    TAKE ONE TABLET BY MOUTH EVERY DAY    Hyperlipidemia LDL goal <100       predniSONE 5 MG tablet    DELTASONE    90 tablet    Take 1 tablet (5 mg) by mouth daily     Kidney replaced by transplant       simethicone 125 MG Chew chewable tablet    MYLICON    120 tablet    Take 1 tablet (125 mg) by mouth as needed for intestinal gas        tacrolimus 1 MG capsule    GENERIC EQUIVALENT    180 capsule    Take 2 capsules (2 mg) by mouth every evening (total dose 3.5 mg twice daily)    Kidney transplant recipient, Long-term use of immunosuppressant medication       TraMADol HCl 50 MG Tbdp     90 tablet    Take 50 mg by mouth 3 times daily as needed    Chronic shoulder pain, unspecified laterality       TRILEPTAL 300 MG tablet   Generic drug:  OXcarbazepine     180 tablet    Take 1 tablet (300 mg) by mouth 2 times daily For pain    Diabetic polyneuropathy associated with diabetes mellitus due to underlying condition (H)       vitamin D 2000 UNITS tablet     100 tablet    Take 4,000 Units by mouth 2 times daily    Vitamin deficiency       * Notice:  This list has 6 medication(s) that are the same as other medications prescribed for you. Read the directions carefully, and ask your doctor or other care provider to review them with you.

## 2017-11-21 NOTE — MR AVS SNAPSHOT
After Visit Summary   11/21/2017    Gem Jade    MRN: [id number]           Patient Information     Date Of Birth          1945        Visit Information        Provider Department      11/21/2017 3:15 PM Conchita Toledo [facility]        Today's Diagnoses     Kidney replaced by transplant    -  1    Itching          Care Instructions    See AVS from PCP encounter for details           Follow-ups after your visit        Your next 10 appointments already scheduled     Dec 06, 2017  2:00 PM CST   Return Visit with ANTHONY Antonio CNP   [facility] ([facility]    [address] [place] [address]  [address]  [facility] [address] [phone]            Dec 06, 2017  2:00 PM CST   Office Visit with Conchita Toledo [facility] ([facility])    [address] [place] [address]  [address]  [facility] [address] [phone]           Bring a current list of meds and any records pertaining to this visit. For Physicals, please bring immunization records and any forms needing to be filled out. Please arrive 10 minutes early to complete paperwork.              Future tests that were ordered for you today     Open Future Orders        Priority Expected Expires Ordered    Tacrolimus level Routine  11/21/2018 11/21/2017    Basic metabolic panel Routine  11/21/2018 11/21/2017            Who to contact     If you have questions or need follow up information about today's clinic visit or your schedule please contact [facility] directly at [phone].  Normal or non-critical lab and imaging results will be communicated to you by MyChart, letter or phone within 4 business days after the clinic has received the results. If you do not hear from us within 7 days, please contact the clinic through MyChart or phone. If you  have a critical or abnormal lab result, we will notify you by phone as soon as possible.  Submit refill requests through [employer] or call your pharmacy and they will forward the refill request to us. Please allow 3 business days for your refill to be completed.          Additional Information About Your Visit        [employer] Information     [employer] gives you secure access to your electronic health record. If you see a primary care provider, you can also send messages to your care team and make appointments. If you have questions, please call your primary care clinic.  If you do not have a primary care provider, please call [phone] and they will assist you.        Care EveryWhere ID     This is your Care EveryWhere ID. This could be used by other organizations to access your [place] medical records  [id number]         Blood Pressure from Last 3 Encounters:   11/21/17 130/70   11/19/17 127/48   11/16/17 140/88    Weight from Last 3 Encounters:   11/17/17 224 lb 1.6 oz (101.7 kg)   11/08/17 228 lb 2.8 oz (103.5 kg)   04/27/17 199 lb 8 oz (90.5 kg)                 Today's Medication Changes          These changes are accurate as of: 11/21/17 11:59 PM.  If you have any questions, ask your nurse or doctor.               These medicines have changed or have updated prescriptions.        Dose/Directions    insulin aspart 100 UNIT/ML injection   Commonly known as:  NovoLOG FLEXPEN   This may have changed:  additional instructions   Used for:  Type 2 diabetes mellitus with diabetic polyneuropathy, with long-term current use of insulin (H)        Inject 10 units under the skin 2 times daily before meals as directed. Add sliding scale as needed. Avg use 25 units per day   Quantity:  15 mL   Refills:  11                Primary Care Provider Office Phone # Fax #    ANTHONY Antonio [phone]       [address] [place] [address]        Equal Access to Services     CARLOS ENRIQUE GREEN  AH: Hadii jairo rosseusebio Sojorgeali, waaxda luqadaha, qaybta kajb jimenez, jessica sheyla emilianareggie patelhugocarlitos roman. So [facility] [phone].    ATENCIÓN: Si habla español, tiene a peguero disposición servicios gratuitos de asistencia lingüística. Llame al [phone].    We comply with applicable federal civil rights laws and [place] laws. We do not discriminate on the basis of race, color, national origin, age, disability, sex, sexual orientation, or gender identity.            Thank you!     Thank you for choosing [facility]  for your care. Our goal is always to provide you with excellent care. Hearing back from our patients is one way we can continue to improve our services. Please take a few minutes to complete the written survey that you may receive in the mail after your visit with us. Thank you!             Your Updated Medication List - Protect others around you: Learn how to safely use, store and throw away your medicines at www.disposemymeds.org.          This list is accurate as of: 11/21/17 11:59 PM.  Always use your most recent med list.                   Brand Name Dispense Instructions for use Diagnosis    ACCU-CHEK COMPACT PLUS test strip   Generic drug:  blood glucose monitoring     100 strip    Use to test blood sugar 3 times daily or as directed.  Ok to substitute alternative if insurance prefers.        ACETAMINOPHEN PO      Take 325-650 mg by mouth every 4 hours as needed.        aspirin 81 MG chewable tablet     90 tablet    Take 1 tablet (81 mg) by mouth daily Starting 1 week after your kidney biopsy    Pulmonary embolism and infarction (H), Diabetes mellitus with background retinopathy (H)       cinacalcet 30 MG tablet    SENSIPAR    60 tablet    Take 1 tablet (30 mg) by mouth daily    Kidney replaced by transplant, Hypercalcemia       cloNIDine 0.1 MG tablet    CATAPRES    180 tablet    TAKE ONE TABLET BY MOUTH TWICE A DAY    HTN, goal below 140/90        Cranberry 400 MG Tabs      Take 400 mg by mouth 2 times daily        cyanocobalamin 1000 MCG/ML injection    VITAMIN B12    3 mL    Inject 1 mL (1,000 mcg) into the muscle every 30 days    Iron deficiency anemia, unspecified iron deficiency anemia type       * diphenoxylate-atropine 2.5-0.025 MG per tablet    LOMOTIL    30 tablet    Take 1 tablet by mouth 4 times daily as needed for diarrhea    Diarrhea of presumed infectious origin       * diphenoxylate-atropine 2.5-0.025 MG per tablet    LOMOTIL    30 tablet    Take 2 tablets by mouth 4 times daily as needed for diarrhea    Diarrhea, unspecified type       famotidine 20 MG tablet    PEPCID    60 tablet    Take 1 tablet (20 mg) by mouth 2 times daily        gabapentin 300 MG capsule    NEURONTIN    180 capsule    Take 3 capsules (900 mg) by mouth 2 times daily For pain    Diabetic polyneuropathy associated with type 2 diabetes mellitus (H)       insulin aspart 100 UNIT/ML injection    NovoLOG FLEXPEN    15 mL    Inject 10 units under the skin 2 times daily before meals as directed. Add sliding scale as needed. Avg use 25 units per day    Type 2 diabetes mellitus with diabetic polyneuropathy, with long-term current use of insulin (H)       insulin glargine 100 UNIT/ML injection    LANTUS SOLOSTAR    15 mL    Inject 24 units under the skin daily.    Type 2 diabetes mellitus with diabetic polyneuropathy, with long-term current use of insulin (H)       insulin pen needle 30G X 8 MM     100 each    Use as directed with Lantus    Type 2 diabetes mellitus with diabetic nephropathy (H)       labetalol 300 MG tablet    NORMODYNE    120 tablet    Take 150 mg by mouth 2 times daily Take 1/2 tablet (150mg) =300mg. Hold for systolic BP less than 120.    Benign essential hypertension, Kidney replaced by transplant       lactobacillus rhamnosus (GG) capsule     60 capsule    Take 1 capsule by mouth 2 times daily    Diarrhea       levothyroxine 50 MCG tablet    SYNTHROID    90  tablet    Take 1 tablet (50 mcg) by mouth daily    Other specified hypothyroidism       lidocaine 2 % topical gel    XYLOCAINE    30 mL    Apply topically as needed for moderate pain 10 ML every 3 weeks    Johnson catheter in place       loratadine 10 MG tablet    CLARITIN    90 tablet    Take 1 tablet (10 mg) by mouth daily    Acute nasopharyngitis       meclizine 12.5 MG tablet    ANTIVERT    30 tablet    Take 1 tablet (12.5 mg) by mouth 3 times daily as needed for dizziness    Disorientation       ondansetron 4 MG ODT tab    ZOFRAN ODT    20 tablet    Take 1-2 tablets (4-8 mg) by mouth every 8 hours as needed for nausea    Nausea       order for DME     3 catheter    Equipment being ordered: Johnson Catheters - 18 french    Johnson catheter status       * order for DME     100 pad    Equipment being ordered: Blue Chux Please send to [facility]    Fecal incontinence       * order for DME     1 each    Equipment being ordered: [employer] FX CPAP interface (nasal pillows), size XS.    Obstructive sleep apnea syndrome       * order for DME     12 each    Equipment being ordered: Silver Impregnated catheters HX of frequent UTI    Recurrent UTI       * order for DME     1 Device    High back guerrero wheel chair MICHELLE 99 DX: morbid obesity, generalized weakness, physical deconditioning, chronic vertigo    Morbid obesity, unspecified obesity type (H), Vertigo, Generalized muscle weakness, Physical deconditioning       pravastatin 10 MG tablet    PRAVACHOL    90 tablet    TAKE ONE TABLET BY MOUTH EVERY DAY    Hyperlipidemia LDL goal <100       predniSONE 5 MG tablet    DELTASONE    90 tablet    Take 1 tablet (5 mg) by mouth daily    Kidney replaced by transplant       simethicone 125 MG Chew chewable tablet    MYLICON    120 tablet    Take 1 tablet (125 mg) by mouth as needed for intestinal gas        tacrolimus 1 MG capsule    GENERIC EQUIVALENT    180 capsule    Take 2 capsules (2 mg) by mouth every evening (total dose 3.5 mg  twice daily)    Kidney transplant recipient, Long-term use of immunosuppressant medication       TraMADol HCl 50 MG Tbdp     90 tablet    Take 50 mg by mouth 3 times daily as needed    Chronic shoulder pain, unspecified laterality       TRILEPTAL 300 MG tablet   Generic drug:  OXcarbazepine     180 tablet    Take 1 tablet (300 mg) by mouth 2 times daily For pain    Diabetic polyneuropathy associated with diabetes mellitus due to underlying condition (H)       vitamin D 2000 UNITS tablet     100 tablet    Take 4,000 Units by mouth 2 times daily    Vitamin deficiency       * Notice:  This list has 6 medication(s) that are the same as other medications prescribed for you. Read the directions carefully, and ask your doctor or other care provider to review them with you.

## 2017-11-21 NOTE — PROGRESS NOTES
SUBJECTIVE/OBJECTIVE:                Gem Jade is a 72 year old female seen at their home for a transitions of care visit.  She was discharged from [facility] on 11/19/17 for altered mental status related to UTI.     Chief Complaint: itching.  Tobacco: History of tobacco dependence - quit   Alcohol: not currently using    Medication Adherence: no issues found and has assistance setting up med boxes (Keshawn).     itching: currently taking Benadryl 25mg PRN, took two doses on separate occasions when unable to tolerate itching. SE: very tired, has been sleeping most of the time since discharge. Benadryl helping somewhat with itching.    S/p kidney transplant: currently taking tacrolimus 2mg BID since Sunday night and prednisone 5mg daily. SE: itching   Keshawn was unaware he was supposed to contact transplant clinic after discharge to further discuss plan for elevated tac levels. Received a phone call from [facility] nephrologist while at the visit today and PCP discussed care coordination. See her note for details.  Per discharge summary:  Renal Transplant with Immunosuppression: LDKT with allograft serum. Transplant in 1999. Baseline serum creatinine 0.9-2.0 and is 1.79 now. The patient had a biopsy in April with Focal segmental glomerulosclerosis and 40% tubulo-interstitial and global scarring. Managed on Tacrolimus and prednisone typically.   - appreciate Nephrology recommendations and input  - restarted tacrolimus per nephrology 3mg PO BID. Tacrolimus level returned 12.9 toay discussed with Nephrology as pt was on tacrolimus drip for few days and fluconazole could have caused the high levels. So they reduced the dose to 2mg tonight and pt to call the transplant office tomorrow re further dosing. Called  and updated him RE this   - restarted cinacalcet   - continue prednisone PTA dose   - Transplant coordinator, Porsha Silva ([phone] M-W, [phone] Th-F), updated by Intensivist    Current labs  include:  BP Readings from Last 3 Encounters:   11/19/17 127/48   11/16/17 140/88   11/09/17 163/56       Lab Results   Component Value Date    A1C 7.7 11/14/2017   .  Lab Results   Component Value Date    CHOL 164 12/05/2016     Lab Results   Component Value Date    TRIG 272 12/05/2016     Lab Results   Component Value Date    HDL 46 12/05/2016     Lab Results   Component Value Date    LDL 64 12/05/2016       Liver Function Studies -   Recent Labs   Lab Test  11/16/17   0525   PROTTOTAL  7.0   ALBUMIN  2.7*   BILITOTAL  0.3   ALKPHOS  64   AST  13   ALT  11       Lab Results   Component Value Date    UCRR 43 11/16/2017    MICROL 1850 04/26/2017    UMALCR 6776.56 (H) 04/26/2017       Last Basic Metabolic Panel:  Lab Results   Component Value Date     11/17/2017      Lab Results   Component Value Date    POTASSIUM 4.1 11/17/2017     Lab Results   Component Value Date    CHLORIDE 108 11/17/2017     Lab Results   Component Value Date    BUN 42 11/17/2017     Lab Results   Component Value Date    CR 1.79 11/19/2017     GFR Estimate   Date Value Ref Range Status   11/19/2017 28 (L) >60 mL/min/1.7m2 Final     Comment:     Non  GFR Calc   11/18/2017 31 (L) >60 mL/min/1.7m2 Final     Comment:     Non  GFR Calc   11/17/2017 30 (L) >60 mL/min/1.7m2 Final     Comment:     Non  GFR Calc     GFR Estimate If Black   Date Value Ref Range Status   11/19/2017 34 (L) >60 mL/min/1.7m2 Final     Comment:      GFR Calc   11/18/2017 38 (L) >60 mL/min/1.7m2 Final     Comment:      GFR Calc   11/17/2017 36 (L) >60 mL/min/1.7m2 Final     Comment:      GFR Calc     TSH   Date Value Ref Range Status   11/13/2017 0.77 0.40 - 4.00 mU/L Final   ]    Most Recent Immunizations   Administered Date(s) Administered     HepB 01/13/2003     Influenza (H1N1) 11/25/2009     Influenza (High Dose) 3 valent vaccine 10/07/2016     Influenza (IIV3) PF  11/05/2011     Influenza Vaccine IM 3yrs+ 4 Valent IIV4 10/16/2013     Pneumococcal (PCV 13) 12/01/2015     Pneumococcal 23 valent 06/17/2013     TDAP Vaccine (Adacel) 06/17/2013     Zoster vaccine, live 06/17/2013       ASSESSMENT:                 Current medications were reviewed today.      Medication Adherence: no issues identified    itching: needs improvement. Likely 2/2 elevated Tac levels. See plan below.     S/p kidney transplant: needs improvement. Last Tac levels were high; pt would benefit from recheck and f/u with transplant coordinator.     Discussed closure of Complex Care clinic on 12/31/17 and transition planning. There has been some difficulty communicating transition plan and pt's limited ability to transport to clinic with Dr Carlos at [facility], pt's previous PCP. Discussed alternative options for care and potential in-home options.   Recommend pt have ongoing MTM services. If patient chooses [facility], I will be able to provide ongoing telephone support for MTM. Also would be able to support pt through Integrated Primary Care clinic.       PLAN:                Recheck Tac and BMP    I spent 60 minutes with this patient today  . All changes were made via collaborative practice agreement with Marivel Barcenas. A copy of the visit note was provided to the patient's primary care provider.    Will follow up in 2 weeks.    The patient was mailed a summary of these recommendations as an after visit summary.    Conchita Toledo, PharmD, BCACP

## 2017-11-21 NOTE — TELEPHONE ENCOUNTER
Called Yashira back with okay for resumption of care orders, per Klaudia Barcenas NP.    Madeline Flores RN

## 2017-11-21 NOTE — TELEPHONE ENCOUNTER
Klaudia would like to know what Dr. Elizabeth, transplant doctor, thinks of pt being on prophylactic antibiotics for recurrent UTIs. This was recommended during her last hospitalization and they deferred it to Klaudia.    I attempted to reach Shabbir Jacinto, RN Transplant coordinator, but he is not in the office and vmail said not to leave him a message.  On 11/20 I sent a staff message to Fernanda Boo RN, who addressed pt's Tacrolimus level on 11/14, asking if she could help us in Shabbir's absence. Have not hear back from her yet.    Per Klaudia, I sent a staff message directly to Dr. Elizabeth asking him to weigh in if he is willing.    Madeline Flores RN

## 2017-11-21 NOTE — PROGRESS NOTES
Clinic Care Coordination Contact  Care Team Conversations    Late entry from 11/20/2017---CC had a conversation with administrative staff to plan to transition the patient back to the [facility] .  A designated room has been arranged in the clinic to accommodate a stretcher for a visit in the future after her last Complex Care Visit 12/6/2017.  CC updated Dr Carlos and  Daniel Harris RN /Complex Care Clinic     CC left a message on Klaudia Barcenas NP ([phone]) VM   to call CC back with an update on the patient.  CC  requested a warm handoff conversation with Dr Carlos after 12/6 visit     Alexus Bassett RN / Clinical Care Coordinator     [place] [address]  [email or web address]  Office :  [phone] / Fax :  [phone]

## 2017-11-22 ENCOUNTER — TELEPHONE (OUTPATIENT)
Dept: FAMILY MEDICINE | Facility: CLINIC | Age: 72
End: 2017-11-22

## 2017-11-22 NOTE — PROGRESS NOTES
"  SUBJECTIVE:   Gem Jade is a 68 year old female who is seen in her home due to inability to leave the home unassisted, morbid obesity, severe vertigo, generalized weakness, chronic pain, bedbound, rarely able to get up in the WC, due to vertigo,  seen today for the following health issues, seen with       Hospital Follow-up Visit:    Hospital/Nursing Home/IP Rehab Facility: [facility]  Date of Admission: ***  Date of Discharge: ***  Reason(s) for Admission: ***            Problems taking medications regularly:  {NONE DEFAULTED:991494::\"None\"}       Medication changes since discharge: {NONE DEFAULTED:765339::\"None\"}       Problems adhering to non-medication therapy:  {NONE DEFAULTED:030439::\"None\"}  {roomer to stop here, delete this reminder}  Summary of hospitalization:  {HOSPITAL DISCHARGE SUMMARY INFO:941099::\"[facility] discharge summary reviewed\"}  Diagnostic Tests/Treatments reviewed.  Follow up needed: {NONE DEFAULTED:802042::\"none\"}  Other Healthcare Providers Involved in Patient s Care:         {those currently involved after discharge:678591::\"None\"}  Update since discharge: {IMPROVED DEFAULT:819416::\"improved.\"} {include information from family, SNF, care coordination}    Post Discharge Medication Reconciliation: {ACO Med Rec (Provider):561628}.  Plan of care communicated with {Communicate Plan to:550693::\"patient\"}     Coding guidelines for this visit:  Type of Medical   Decision Making Face-to-Face Visit       within 7 Days of discharge Face-to-Face Visit        within 14 days of discharge   Moderate Complexity 22280 [id number]   High Complexity 39881 [id number]           Gem Jade was admitted on 11/13/2017 by Robin Velasquez MD and I would refer you to their history and physical.  The following problems were addressed during her hospitalization:  Gem Jade is a 71 year old female who was admitted on 11/13/2017.      Gem Jade is a 71 year old female who " was admitted on 11/13/2017 with altered mental status related to UTI. She has a PMH of kidney transplant, neurogenic bladder with chronic indwelling catheter and recurrent UTIs.       Recurrent Sepsis secondary to complicated UTI secondary to indwelling rico catheter (candida albicans):  History of neurogenic bladder and indwelling rico catheter. She has frequent UTIs. UC this admission grew candida. She recently had another UTI with positive UC on 11/6/17 - grew Citrobacter freundii complex. She has a history of klebsiella, ESBL, VRE.  Indwelling rico changed this admission.   - d/jose diflucan on 11/17 as tacrolimus and fluconazole can cause QT prolongation and EKG showed prolonged QT at 486 worse from 475 few days prior   Placed on tele monitoring   Repeat EKG today after d/cing fluconazole showed QTc improved to 486 to  448  Candida in urine could be from colonization per ID   - d/jose meropenem and fluconazole  per ID   Monitored off antimicrobials. Doing well       Hypokalemia:   Improved       Acute Respiratory Failure secondary to Urosepsis/AMS: The patient was initially brought in the ED with a GCS 5-6, intubated for airway protection. She was successfully extubated on 11/15/17. She has been weaned to room air, following commands but mentation remains variable. Per the patient's , she often has issues following a UTI with confusion and paranoia.   - continue RT support for  to set up home CPAP and pulmonary toilet   - delirium prevention  Stable. Improved       Renal Transplant with Immunosuppression: LDKT with allograft serum. Transplant in 1999. Baseline serum creatinine 0.9-2.0 and is 1.79 now. The patient had a biopsy in April with Focal segmental glomerulosclerosis and 40% tubulo-interstitial and global scarring. Managed on Tacrolimus and prednisone typically.   - appreciate Nephrology recommendations and input  - restarted tacrolimus per nephrology 3mg PO BID. Tacrolimus level returned  "12.9 toay discussed with Nephrology as pt was on tacrolimus drip for few days and fluconazole could have caused the high levels. So they reduced the dose to 2mg tonight and pt to call the transplant office tomorrow re further dosing. Called  and updated him RE this   - restarted cinacalcet   - continue prednisone PTA dose   - Transplant coordinator, Porsha Ricardo ([phone] M-W, [phone] Th-F), updated by Intensivist      Hypertension: Well managed on labetolol, and clonidine in the outpatient setting. Her BP has been well managed during her hospitalization, currently 140s/80s, HR 80s. Her home meds have been restarted.   - continue PTA labetolol and clonidine  - labetolol IV PRN          Obstructive Sleep Apnea: CPAP depedent   - RT consult to help set up CPAP   - use CPAP on home settings at night      GERD: Managed on Pepcid PO at home.  - Restart pepcid PO  - advance diet as tolerated      Hypothyroidism: TSH 0.77 on 11/13/17  - restart levothyroxine at PTA dose      Diabetes Type II: Complicated by severe diabetic neuropathy and arthropathy (Charcot foot), chronic pain. Most recent HgbA1C 7.7.  - transition from insulin drip to medium sliding scale  -will increase lantus PTA dose to 24 today   Added novolog 7units TID with meals. PTA regimen restarted on discharge       DVT Prophylaxis: Heparin SQ  Code Status: Full Code    {additional problems for provider to add:477902}    Problem list and histories reviewed & adjusted, as indicated.  Additional history: {NONE - AS DOCUMENTED:001015::\"as documented\"}    {HIST REVIEW/ LINKS 2:583086}    Reviewed and updated as needed this visit by clinical staff       Reviewed and updated as needed this visit by Provider         {PROVIDER CHARTING PREFERENCE:392143}    "

## 2017-11-22 NOTE — TELEPHONE ENCOUNTER
Left vmail for Porsha Silva RN Transplant Coordinator, per Conchita and Klaudia's notes from 11/21.  Asked Porsha to return my call to discuss the patient's situation, per Klaudia. Klaudia wants to be sure the transplant team is aware of pt's labs, that she was discharged from hospital on 11/19 and they will need coordination for Tac dosing and labs.    Tacrolimus level is going to be drawn on Fri, 11/24 at noon, by Client Services phlebotomist.    Madeline Flores RN

## 2017-11-22 NOTE — TELEPHONE ENCOUNTER
Per Klaudia, called Keshawn to inform him of lab draw scheduled for Friday and that Klaudia reached Shabbir, Transplant coordinator, and he said not to be overly concerned about Tac level the other day as it may not have been a good trough. He just recommended having the Tac level rechecked. This will be done Fri and the transplant team will talk with them about med dosing.    Unfortunately, when I called the pt's number it rang over 20 times and no voicemail available.    Madeline Flores RN

## 2017-11-22 NOTE — TELEPHONE ENCOUNTER
Dr. Elizabeth said he does not know what would be best for Aby regarding prophylactic abx. He thinks this should have come from inpatient Transplant and ID.

## 2017-11-24 DIAGNOSIS — Z94.0 KIDNEY REPLACED BY TRANSPLANT: ICD-10-CM

## 2017-11-24 LAB
ANION GAP SERPL CALCULATED.3IONS-SCNC: 7 MMOL/L (ref 3–14)
BUN SERPL-MCNC: 39 MG/DL (ref 7–30)
CALCIUM SERPL-MCNC: 7.5 MG/DL (ref 8.5–10.1)
CHLORIDE SERPL-SCNC: 105 MMOL/L (ref 94–109)
CO2 SERPL-SCNC: 26 MMOL/L (ref 20–32)
CREAT SERPL-MCNC: 1.41 MG/DL (ref 0.52–1.04)
GFR SERPL CREATININE-BSD FRML MDRD: 37 ML/MIN/1.7M2
GLUCOSE SERPL-MCNC: 164 MG/DL (ref 70–99)
POTASSIUM SERPL-SCNC: 4.4 MMOL/L (ref 3.4–5.3)
SODIUM SERPL-SCNC: 138 MMOL/L (ref 133–144)

## 2017-11-24 PROCEDURE — 80197 ASSAY OF TACROLIMUS: CPT | Performed by: INTERNAL MEDICINE

## 2017-11-24 PROCEDURE — 36415 COLL VENOUS BLD VENIPUNCTURE: CPT | Performed by: INTERNAL MEDICINE

## 2017-11-24 PROCEDURE — 80048 BASIC METABOLIC PNL TOTAL CA: CPT | Performed by: INTERNAL MEDICINE

## 2017-11-25 LAB
TACROLIMUS BLD-MCNC: 5.7 UG/L (ref 5–15)
TME LAST DOSE: NORMAL H

## 2017-11-26 NOTE — PATIENT INSTRUCTIONS
We will repeat your tacrolimus level on Friday and adjust your dose accordingly, we will call you with results    We will continue to work with Nicole/Arlyn, Alexsander and ROSAMARIA as possible transition options for you    I will see you again in Dec. For final transition plans

## 2017-11-26 NOTE — PROGRESS NOTES
SUBJECTIVE:   Gem Jade is a 68 year old female who is seen in her home due to inability to leave the home unassisted, morbid obesity, severe vertigo, generalized weakness, chronic pain, bedbound, rarely able to get up in the WC, due to vertigo,  seen today for the following health issues, seen with       Hospital Follow-up Visit:    Hospital/Nursing Home/IP Rehab Facility: [facility]  Date of Admission: 11/13/17  Date of Discharge: 11/19/17  Reason(s) for Admission: Sepsis from UTI, mental confusion             Problems taking medications regularly:   adm meds, just changed to appropriate dose for her tacrolimus, one day ago         Medication changes since discharge: has increased back up to prior insulin dosing, 10U of Novolog with meals and 24u of Lantus at bedtime     Summary of hospitalization:  [facility] discharge summary reviewed  Diagnostic Tests/Treatments reviewed.  Follow up needed: Repeat Tacrolimus level ordered today, previous level high 15.9 but was not drawn with a 12 hour trough so inaccurate   Other Healthcare Providers Involved in Patient s Care:         Homecare, Care Coordination, Physical Therapy and MTM  Update since discharge: fluctuating course. See below:  itching: currently taking Benadryl 25mg PRN, took two doses on separate occasions when unable to tolerate itching. SE: very tired, has been sleeping most of the time since discharge. Benadryl helping somewhat with itching. SE of elevated Tacrolimus level       Post Discharge Medication Reconciliation: discharge medications reconciled and changed, per note/orders (see AVS).  Plan of care communicated with patient, family and caregiver     Coding guidelines for this visit:  Type of Medical   Decision Making Face-to-Face Visit       within 7 Days of discharge Face-to-Face Visit        within 14 days of discharge   Moderate Complexity [id number]   High Complexity [id number]           Gem  ANTONINO Jade was admitted on 11/13/2017 by Robin Velasquez MD and I would refer you to their history and physical.  The following problems were addressed during her hospitalization:  Gem Jade is a 71 year old female who was admitted on 11/13/2017.      Gem Jade is a 71 year old female who was admitted on 11/13/2017 with altered mental status related to UTI. She has a PMH of kidney transplant, neurogenic bladder with chronic indwelling catheter and recurrent UTIs.       Recurrent Sepsis secondary to complicated UTI secondary to indwelling rico catheter (candida albicans):  History of neurogenic bladder and indwelling rico catheter. She has frequent UTIs. UC this admission grew candida. She recently had another UTI with positive UC on 11/6/17 - grew Citrobacter freundii complex. She has a history of klebsiella, ESBL, VRE.  Indwelling rico changed this admission.   - d/jose diflucan on 11/17 as tacrolimus and fluconazole can cause QT prolongation and EKG showed prolonged QT at 486 worse from 475 few days prior   Placed on tele monitoring   Repeat EKG today after d/cing fluconazole showed QTc improved to 486 to  448  Candida in urine could be from colonization per ID   - d/jose meropenem and fluconazole  per ID   Monitored off antimicrobials. Doing well       Hypokalemia:   Improved       Acute Respiratory Failure secondary to Urosepsis/AMS: The patient was initially brought in the ED with a GCS 5-6, intubated for airway protection. She was successfully extubated on 11/15/17. She has been weaned to room air, following commands but mentation remains variable. Per the patient's , she often has issues following a UTI with confusion and paranoia.   - continue RT support for  to set up home CPAP and pulmonary toilet   - delirium prevention  Stable. Improved       Renal Transplant with Immunosuppression: LDKT with allograft serum. Transplant in 1999. Baseline serum creatinine 0.9-2.0 and is 1.79 now.  The patient had a biopsy in April with Focal segmental glomerulosclerosis and 40% tubulo-interstitial and global scarring. Managed on Tacrolimus and prednisone typically.   - appreciate Nephrology recommendations and input  - restarted tacrolimus per nephrology 3mg PO BID. Tacrolimus level returned 12.9 toay discussed with Nephrology as pt was on tacrolimus drip for few days and fluconazole could have caused the high levels. So they reduced the dose to 2mg tonight and pt to call the transplant office tomorrow re further dosing. Called  and updated him RE this   - restarted cinacalcet   - continue prednisone PTA dose   - Transplant coordinator, Porsha Silva ([phone] M-W, [phone] Th-F), updated by Intensivist      Hypertension: Well managed on labetolol, and clonidine in the outpatient setting. Her BP has been well managed during her hospitalization, currently 140s/80s, HR 80s. Her home meds have been restarted.   - continue PTA labetolol and clonidine  - labetolol IV PRN          Obstructive Sleep Apnea: CPAP depedent   - RT consult to help set up CPAP   - use CPAP on home settings at night      GERD: Managed on Pepcid PO at home.  - Restart pepcid PO  - advance diet as tolerated      Hypothyroidism: TSH 0.77 on 11/13/17  - restart levothyroxine at PTA dose      Diabetes Type II: Complicated by severe diabetic neuropathy and arthropathy (Charcot foot), chronic pain. Most recent HgbA1C 7.7.  - transition from insulin drip to medium sliding scale  -will increase lantus PTA dose to 24 today   Added novolog 7units TID with meals. PTA regimen restarted on discharge       DVT Prophylaxis: Heparin SQ  Code Status: Full Code      Problem list and histories reviewed & adjusted, as indicated.  Additional history: as documented    Current Outpatient Prescriptions   Medication Sig Dispense Refill     diphenoxylate-atropine (LOMOTIL) 2.5-0.025 MG per tablet Take 2 tablets by mouth 4 times daily as needed for  diarrhea 30 tablet 0     tacrolimus (GENERIC EQUIVALENT) 1 MG capsule Take 2 capsules (2 mg) by mouth every evening (total dose 3.5 mg twice daily) 180 capsule 3     diphenoxylate-atropine (LOMOTIL) 2.5-0.025 MG per tablet Take 1 tablet by mouth 4 times daily as needed for diarrhea 30 tablet 0     meclizine (ANTIVERT) 12.5 MG tablet Take 1 tablet (12.5 mg) by mouth 3 times daily as needed for dizziness 30 tablet 0     gabapentin (NEURONTIN) 300 MG capsule Take 3 capsules (900 mg) by mouth 2 times daily For pain 180 capsule 3     cloNIDine (CATAPRES) 0.1 MG tablet TAKE ONE TABLET BY MOUTH TWICE A  tablet 3     Cholecalciferol (VITAMIN D) 2000 UNITS tablet Take 4,000 Units by mouth 2 times daily 100 tablet 3     order for DME High back guerrero wheel chair  MICHELLE 99  DX: morbid obesity, generalized weakness, physical deconditioning, chronic vertigo 1 Device 0     predniSONE (DELTASONE) 5 MG tablet Take 1 tablet (5 mg) by mouth daily 90 tablet 3     insulin glargine (LANTUS SOLOSTAR) 100 UNIT/ML injection Inject 24 units under the skin daily. 15 mL 11     insulin aspart (NOVOLOG FLEXPEN) 100 UNIT/ML injection Inject 10 units under the skin 2 times daily before meals as directed. Add sliding scale as needed. Avg use 25 units per day (Patient taking differently: Inject 10 units under the skin 2 times daily before meals as directed. Add sliding scale as needed.  2 units for every 50 over 201.  Avg use 25 units per day) 15 mL 11     lidocaine (XYLOCAINE) 2 % topical gel Apply topically as needed for moderate pain 10 ML every 3 weeks 30 mL 11     labetalol (NORMODYNE) 300 MG tablet Take 150 mg by mouth 2 times daily Take 1/2 tablet (150mg) =300mg. Hold for systolic BP less than 120. 120 tablet 3     loratadine (CLARITIN) 10 MG tablet Take 1 tablet (10 mg) by mouth daily 90 tablet 3     pravastatin (PRAVACHOL) 10 MG tablet TAKE ONE TABLET BY MOUTH EVERY DAY 90 tablet 3     blood glucose monitoring (ACCU-CHEK COMPACT PLUS)  test strip Use to test blood sugar 3 times daily or as directed.  Ok to substitute alternative if insurance prefers. 100 strip prn     OXcarbazepine (TRILEPTAL) 300 MG tablet Take 1 tablet (300 mg) by mouth 2 times daily For pain 180 tablet 3     aspirin 81 MG chewable tablet Take 1 tablet (81 mg) by mouth daily Starting 1 week after your kidney biopsy 90 tablet 3     cinacalcet (SENSIPAR) 30 MG tablet Take 1 tablet (30 mg) by mouth daily 60 tablet 3     cyanocobalamin (VITAMIN B12) 1000 MCG/ML injection Inject 1 mL (1,000 mcg) into the muscle every 30 days 3 mL 3     order for DME Equipment being ordered: Silver Impregnated catheters  HX of frequent UTI 12 each 1     TraMADol HCl 50 MG TBDP Take 50 mg by mouth 3 times daily as needed 90 tablet 3     levothyroxine (SYNTHROID) 50 MCG tablet Take 1 tablet (50 mcg) by mouth daily 90 tablet 3     order for DME Equipment being ordered: Vibrow CPAP interface (nasal pillows), size XS. 1 each 1     insulin pen needle 30G X 8 MM Use as directed with Lantus 100 each 3     ondansetron (ZOFRAN ODT) 4 MG disintegrating tablet Take 1-2 tablets (4-8 mg) by mouth every 8 hours as needed for nausea 20 tablet 5     simethicone (MYLICON) 125 MG CHEW Take 1 tablet (125 mg) by mouth as needed for intestinal gas 120 tablet      Lactobacillus Rhamnosus, GG, (CULTURELL) capsule Take 1 capsule by mouth 2 times daily 60 capsule 11     order for DME Equipment being ordered: Blue Chux  Please send to [facility] 100 pad 3     Cranberry 400 MG TABS Take 400 mg by mouth 2 times daily       famotidine (PEPCID) 20 MG tablet Take 1 tablet (20 mg) by mouth 2 times daily 60 tablet 1     ORDER FOR DME Equipment being ordered: Johnson Catheters - 18 french 3 catheter PRN     ACETAMINOPHEN PO Take 325-650 mg by mouth every 4 hours as needed.       Recent Labs   Lab Test  11/24/17   1514  11/19/17   0745   11/17/17   0708  11/16/17   0525  11/15/17   0505   11/14/17   0455  11/13/17   1613   07/13/17    1105   03/09/17   1310   12/05/16   1210   02/05/15   1250   05/16/13   1240   A1C   --    --    --    --    --    --    --   7.7*   --    --   5.6   --    --    --   7.4*   < >  6.9*   < >  7.2*   LDL   --    --    --    --    --    --    --    --    --    --    --    --    --    --   64   --   18   --   149*   HDL   --    --    --    --    --    --    --    --    --    --    --    --    --    --   46*   --   43*   --   42*   TRIG   --    --    --    --    --    --    --    --    --    --    --    --    --    --   272*   --   223*   --   262*   ALT   --    --    --    --   11 11   --    --   11   < >   --    < >   --    < >   --    < >   --    < >   --    CR  1.41*  1.79*   < >  1.69*  1.79*  1.87*   < >  1.76*  2.06*   < >  1.23*   < >   --    < >   --    < >  0.67   < >  0.70   GFRESTIMATED  37*  28*   < >  30*  28*  27*   < >  28*  24*   < >  43*   < >   --    < >   --    < >  87   < >  84   GFRESTBLACK  44*  34*   < >  36*  34*  32*   < >  34*  29*   < >  52*   < >   --    < >   --    < >  >90   GFR Calc     < >  >90   POTASSIUM  4.4   --    --   4.1  3.3*  3.8   < >   --   4.2   < >  4.3   < >   --    < >   --    < >  4.3   < >  3.9   TSH   --    --    --    --    --    --    --    --   0.77   --    --    --   1.20   --    --    < >   --    < >   --     < > = values in this interval not displayed.      BP Readings from Last 3 Encounters:   11/21/17 130/70   11/19/17 127/48   11/16/17 140/88    Wt Readings from Last 3 Encounters:   11/17/17 224 lb 1.6 oz (101.7 kg)   11/08/17 228 lb 2.8 oz (103.5 kg)   04/27/17 199 lb 8 oz (90.5 kg)          Labs reviewed in EPIC  Reviewed and updated as needed this visit by clinical staff       Reviewed and updated as needed this visit by Provider         ROS:  Constitutional, HEENT, cardiovascular, pulmonary, gi and gu systems are negative, except as otherwise noted.      OBJECTIVE:   /70  Pulse 60  SpO2 96%  There is no height or weight on file to  calculate BMI.  GENERAL: mild distress, obese, frail and fatigued, laying in bed on her back  RESP: lungs clear to auscultation - no rales, rhonchi or wheezes  CV: regular rate and rhythm, normal S1 S2, no S3 or S4, no murmur, click or rub,  ABDOMEN: soft, nontender, no hepatosplenomegaly, no masses and bowel sounds normal  MS: no gross musculoskeletal defects noted,  SKIN: no suspicious lesions or rashes  NEURO: weakness of UE and LE, little core strength, decreased tone in all major muscle groups and abnormal mental status - still not to baseline, confused of and on  PSYCH: concentration poor, confused, affect normal/bright and fatigued    Diagnostic Test Results:  Results for orders placed or performed during the hospital encounter of 11/13/17   XR Chest Port 1 View    Narrative    PORTABLE CHEST ONE VIEW  11/13/2017 4:29 PM     HISTORY: Shortness of breath and altered mental status.      COMPARISON: Chest x-ray 11/6/2017.      Impression    IMPRESSION: Portable view of the chest is performed in the supine  position. Lungs are clear but hypoaerated. Heart is enlarged but  unchanged. Aorta is calcified and tortuous. No obvious pneumothorax on  this supine view. No definite pleural effusions.    THEA MAJOR MD   CT Head w/o Contrast    Narrative    CT SCAN OF THE HEAD WITHOUT CONTRAST  11/13/2017  4:43 PM     HISTORY: Altered mental status and brandie-arrest.    TECHNIQUE: Axial images of the head and coronal reformations without  IV contrast material. Radiation dose for this scan was reduced using  automated exposure control, adjustment of the mA and/or kV according  to patient size, or iterative reconstruction technique.    COMPARISON: 11/6/2017    FINDINGS: There is generalized atrophy of the brain. There is low  attenuation in the white matter of the cerebral hemispheres consistent  with sequelae of small vessel ischemic disease. There is no evidence  of intracranial hemorrhage, mass, acute infarct or anomaly.      The visualized portions of the sinuses and mastoids appear normal.  There is no evidence of trauma.       Impression    IMPRESSION:   1. No acute abnormality.  2. Atrophy of the brain. White matter changes consistent with sequelae  of small vessel ischemic disease. This is unchanged.    ELIZABETH GRIMM MD   Chest  XR, 1 view PORTABLE    Narrative    CHEST PORTABLE ONE VIEW    11/13/2017 7:45 PM     HISTORY: Post intubation.    COMPARISON: Chest x-ray 11/13/2017.      Impression    IMPRESSION:   1. ET tube newly identified, its tip above the abena. Nasogastric  tube is noted.  2. Stable cardiomegaly. Hypoinflated lungs again identified, stable.     GISSEL HARDIN MD   XR Chest Port 1 View    Narrative    CHEST ONE VIEW PORTABLE 11/15/2017 5:53 AM     COMPARISON: Frontal chest x-ray 11/13/2017    HISTORY: Respiratory failure.      Impression    IMPRESSION: Tip of the endotracheal tube remains just below the level  of the clavicular heads. Nasogastric tube again noted.    There is an interval increase in pulmonary vascular prominence and a  slight interval increase in interstitial prominence in both lungs  raising the question of developing pulmonary edema. There is  questionable retrocardiac left lower lung airspace opacity that may  represent pneumonia. The lungs are otherwise clear. There is no  definite pleural effusion. There is no pneumothorax. Heart size  appears within normal limits.    SHAAN SEVILLA MD   CBC with platelets differential   Result Value Ref Range    WBC 6.0 4.0 - 11.0 10e9/L    RBC Count 3.75 (L) 3.8 - 5.2 10e12/L    Hemoglobin 11.3 (L) 11.7 - 15.7 g/dL    Hematocrit 35.7 35.0 - 47.0 %    MCV 95 78 - 100 fl    MCH 30.1 26.5 - 33.0 pg    MCHC 31.7 31.5 - 36.5 g/dL    RDW 13.7 10.0 - 15.0 %    Platelet Count 154 150 - 450 10e9/L    Diff Method Automated Method     % Neutrophils 73.2 %    % Lymphocytes 16.0 %    % Monocytes 7.2 %    % Eosinophils 2.3 %    % Basophils 0.0 %    % Immature  Granulocytes 1.3 %    Nucleated RBCs 0 0 /100    Absolute Neutrophil 4.4 1.6 - 8.3 10e9/L    Absolute Lymphocytes 1.0 0.8 - 5.3 10e9/L    Absolute Monocytes 0.4 0.0 - 1.3 10e9/L    Absolute Eosinophils 0.1 0.0 - 0.7 10e9/L    Absolute Basophils 0.0 0.0 - 0.2 10e9/L    Abs Immature Granulocytes 0.1 0 - 0.4 10e9/L    Absolute Nucleated RBC 0.0    Comprehensive metabolic panel   Result Value Ref Range    Sodium 137 133 - 144 mmol/L    Potassium 4.2 3.4 - 5.3 mmol/L    Chloride 103 94 - 109 mmol/L    Carbon Dioxide 25 20 - 32 mmol/L    Anion Gap 9 3 - 14 mmol/L    Glucose 294 (H) 70 - 99 mg/dL    Urea Nitrogen 47 (H) 7 - 30 mg/dL    Creatinine 2.06 (H) 0.52 - 1.04 mg/dL    GFR Estimate 24 (L) >60 mL/min/1.7m2    GFR Estimate If Black 29 (L) >60 mL/min/1.7m2    Calcium 7.4 (L) 8.5 - 10.1 mg/dL    Bilirubin Total 0.2 0.2 - 1.3 mg/dL    Albumin 3.0 (L) 3.4 - 5.0 g/dL    Protein Total 7.7 6.8 - 8.8 g/dL    Alkaline Phosphatase 67 40 - 150 U/L    ALT 11 0 - 50 U/L    AST 8 0 - 45 U/L   Lactic acid whole blood   Result Value Ref Range    Lactic Acid 0.8 0.7 - 2.0 mmol/L   Glucose by meter   Result Value Ref Range    Glucose 293 (H) 70 - 99 mg/dL   UA reflex to Microscopic and Culture (ED Lab POCT Only 3-11)   Result Value Ref Range    Color Urine Yellow     Appearance Urine Turbid     Glucose Urine 500 (A) NEG^Negative mg/dL    Bilirubin Urine Negative NEG^Negative    Ketones Urine Negative NEG^Negative mg/dL    Specific Gravity Urine 1.015 1.003 - 1.035    Blood Urine Moderate (A) NEG^Negative    pH Urine 6.5 5.0 - 7.0 pH    Protein Albumin Urine 100 (A) NEG^Negative mg/dL    Urobilinogen Urine 0.2 0.2 - 1.0 EU/dL    Nitrite Urine Negative NEG^Negative    Leukocyte Esterase Urine Small (A) NEG^Negative    Source Catheterized Urine    Urine Microscopic   Result Value Ref Range    WBC Urine >100 (A) OTO2^O - 2 /HPF    RBC Urine 25-50 (A) OTO2^O - 2 /HPF    Transitional Epi Moderate (A) FEW^Few /HPF    Bacteria Urine Many (A)  NEG^Negative /HPF    Yeast Urine Many (A) NEG^Negative /HPF   Blood gas venous and oxyhgb   Result Value Ref Range    Ph Venous Canceled, Test credited 7.32 - 7.43 pH    PCO2 Venous Canceled, Test credited 40 - 50 mm Hg    PO2 Venous Canceled, Test credited 25 - 47 mm Hg    Bicarbonate Venous Canceled, Test credited 21 - 28 mmol/L    FIO2 Canceled, Test credited     Oxyhemoglobin Venous Canceled, Test credited %    Base Excess Venous Canceled, Test credited mmol/L    Base Deficit Venous Canceled, Test credited mmol/L   Blood gas arterial and oxyhgb   Result Value Ref Range    pH Arterial 7.25 (L) 7.35 - 7.45 pH    pCO2 Arterial 55 (H) 35 - 45 mm Hg    pO2 Arterial 133 (H) 80 - 105 mm Hg    Bicarbonate Arterial 24 21 - 28 mmol/L    Oxyhemoglobin Arterial 98 92 - 100 %   Drug abuse screen 77 urine ([place],SH)   Result Value Ref Range    Amphetamine Qual Urine Negative NEG^Negative    Barbiturates Qual Urine Negative NEG^Negative    Benzodiazepine Qual Urine Negative NEG^Negative    Cannabinoids Qual Urine Negative NEG^Negative    Cocaine Qual Urine Negative NEG^Negative    Opiates Qualitative Urine Negative NEG^Negative    PCP Qual Urine Negative NEG^Negative   Troponin I   Result Value Ref Range    Troponin I ES <0.015 0.000 - 0.045 ug/L   Glucose by meter   Result Value Ref Range    Glucose 260 (H) 70 - 99 mg/dL   Acetaminophen level   Result Value Ref Range    Acetaminophen Level <2 mg/L   Salicylate level   Result Value Ref Range    Salicylate Level <2 mg/dL   TSH with free T4 reflex   Result Value Ref Range    TSH 0.77 0.40 - 4.00 mU/L   Glucose by meter   Result Value Ref Range    Glucose 229 (H) 70 - 99 mg/dL   Glucose by meter   Result Value Ref Range    Glucose 247 (H) 70 - 99 mg/dL   Blood gas arterial with oxyhemoglobin (1200)   Result Value Ref Range    pH Arterial 7.49 (H) 7.35 - 7.45 pH    pCO2 Arterial 26 (L) 35 - 45 mm Hg    pO2 Arterial 252 (H) 80 - 105 mm Hg    Bicarbonate Arterial 20 (L) 21 - 28  mmol/L    FIO2 70     Oxyhemoglobin Arterial 99 92 - 100 %    Base Deficit Art 2.4 mmol/L   Blood gas arterial with oxyhemoglobin (1200)   Result Value Ref Range    pH Arterial 7.38 7.35 - 7.45 pH    pCO2 Arterial 37 35 - 45 mm Hg    pO2 Arterial 207 (H) 80 - 105 mm Hg    Bicarbonate Arterial 22 21 - 28 mmol/L    FIO2 50     Oxyhemoglobin Arterial 99 92 - 100 %    Base Deficit Art 2.5 mmol/L   Hemoglobin A1c   Result Value Ref Range    Hemoglobin A1C 7.7 (H) 4.3 - 6.0 %   Creatinine   Result Value Ref Range    Creatinine 1.76 (H) 0.52 - 1.04 mg/dL    GFR Estimate 28 (L) >60 mL/min/1.7m2    GFR Estimate If Black 34 (L) >60 mL/min/1.7m2   Tacrolimus level (AM Draw)   Result Value Ref Range    Tacrolimus Last Dose 11.9.17 1628     Tacrolimus Level 5.8 5.0 - 15.0 ug/L   Glucose by meter   Result Value Ref Range    Glucose 226 (H) 70 - 99 mg/dL   Glucose by meter   Result Value Ref Range    Glucose 181 (H) 70 - 99 mg/dL   Glucose by meter   Result Value Ref Range    Glucose 158 (H) 70 - 99 mg/dL   Glucose by meter   Result Value Ref Range    Glucose 137 (H) 70 - 99 mg/dL   Glucose by meter   Result Value Ref Range    Glucose 138 (H) 70 - 99 mg/dL   Glucose by meter   Result Value Ref Range    Glucose 122 (H) 70 - 99 mg/dL   Procalcitonin   Result Value Ref Range    Procalcitonin 0.09 ng/ml   Basic metabolic panel   Result Value Ref Range    Sodium 140 133 - 144 mmol/L    Potassium 3.6 3.4 - 5.3 mmol/L    Chloride 108 94 - 109 mmol/L    Carbon Dioxide 22 20 - 32 mmol/L    Anion Gap 10 3 - 14 mmol/L    Glucose 136 (H) 70 - 99 mg/dL    Urea Nitrogen 45 (H) 7 - 30 mg/dL    Creatinine 1.76 (H) 0.52 - 1.04 mg/dL    GFR Estimate 28 (L) >60 mL/min/1.7m2    GFR Estimate If Black 34 (L) >60 mL/min/1.7m2    Calcium 8.0 (L) 8.5 - 10.1 mg/dL   Magnesium (1200)   Result Value Ref Range    Magnesium 2.0 1.6 - 2.3 mg/dL   Glucose by meter   Result Value Ref Range    Glucose 121 (H) 70 - 99 mg/dL   Glucose by meter   Result Value Ref  Range    Glucose 128 (H) 70 - 99 mg/dL   CBC with platelets   Result Value Ref Range    WBC 7.5 4.0 - 11.0 10e9/L    RBC Count 3.69 (L) 3.8 - 5.2 10e12/L    Hemoglobin 11.3 (L) 11.7 - 15.7 g/dL    Hematocrit 34.4 (L) 35.0 - 47.0 %    MCV 93 78 - 100 fl    MCH 30.6 26.5 - 33.0 pg    MCHC 32.8 31.5 - 36.5 g/dL    RDW 13.7 10.0 - 15.0 %    Platelet Count 152 150 - 450 10e9/L   Tacrolimus level   Result Value Ref Range    Tacrolimus Last Dose Unknown     Tacrolimus Level 5.5 5.0 - 15.0 ug/L   Glucose by meter   Result Value Ref Range    Glucose 110 (H) 70 - 99 mg/dL   Glucose by meter   Result Value Ref Range    Glucose 126 (H) 70 - 99 mg/dL   Glucose by meter   Result Value Ref Range    Glucose 106 (H) 70 - 99 mg/dL   Glucose by meter   Result Value Ref Range    Glucose 97 70 - 99 mg/dL   Tacrolimus level   Result Value Ref Range    Tacrolimus Last Dose Not Provided     Tacrolimus Level 5.3 5.0 - 15.0 ug/L   Glucose by meter   Result Value Ref Range    Glucose 125 (H) 70 - 99 mg/dL   Glucose by meter   Result Value Ref Range    Glucose 111 (H) 70 - 99 mg/dL   Glucose by meter   Result Value Ref Range    Glucose 95 70 - 99 mg/dL   Glucose by meter   Result Value Ref Range    Glucose 89 70 - 99 mg/dL   Comprehensive metabolic panel   Result Value Ref Range    Sodium 139 133 - 144 mmol/L    Potassium 3.8 3.4 - 5.3 mmol/L    Chloride 107 94 - 109 mmol/L    Carbon Dioxide 22 20 - 32 mmol/L    Anion Gap 10 3 - 14 mmol/L    Glucose 156 (H) 70 - 99 mg/dL    Urea Nitrogen 44 (H) 7 - 30 mg/dL    Creatinine 1.87 (H) 0.52 - 1.04 mg/dL    GFR Estimate 27 (L) >60 mL/min/1.7m2    GFR Estimate If Black 32 (L) >60 mL/min/1.7m2    Calcium 7.9 (L) 8.5 - 10.1 mg/dL    Bilirubin Total 0.3 0.2 - 1.3 mg/dL    Albumin 2.8 (L) 3.4 - 5.0 g/dL    Protein Total 7.4 6.8 - 8.8 g/dL    Alkaline Phosphatase 70 40 - 150 U/L    ALT 11 0 - 50 U/L    AST 12 0 - 45 U/L     *Note: Due to a large number of results and/or encounters for the requested time  period, some results have not been displayed. A complete set of results can be found in Results Review.       ASSESSMENT/PLAN:       (Z94.0) Kidney replaced by transplant  (primary encounter diagnosis)  Comment: Elevated Tac level in the hospital 15.9  Plan: discussed with Hospitalist at visit repeating level on Friday 11/24 and adjust dosing with transplant team, current dose is 2mg 2x daily    (N39.0) Recurrent UTI  Comment: struggling with UTI  Plan: discussed placing in care coordination note balloon size of 30cc and silver tipped cath, discussed prophylactic antibiotics which have been unsuccessful in the past so will hold off for now     (R41.82) Altered mental status, unspecified altered mental status type  Comment: not at baseline today but improved, able to participate in conversation with some confusion   Plan: monitor    (E11.42) Diabetic polyneuropathy associated with type 2 diabetes mellitus (H)  Comment: reduced dose of novolog to 7u with meals in the hospital  Plan: will increase back up to 10u as her food intake is back to normal, sugars have been WNL, maintain 24u of lantus     (I10) Benign essential hypertension  Comment: stable  Plan: discussed adequate fluid intake     Discussed transition options    Patient Instructions   We will repeat your tacrolimus level on Friday and adjust your dose accordingly, we will call you with results    We will continue to work with Nicole/Alexsander Stoddard and ROSAMARIA as possible transition options for you    I will see you again in Dec. For final transition plans       Marivel Barcenas, ALISON, APRN [facility]IN[facility]    Visit time 60   min with > than 50% of the time spent in counseling on: care coordination, hospital follow up, DM , kidney transplant, mental status

## 2017-11-27 ENCOUNTER — TELEPHONE (OUTPATIENT)
Dept: FAMILY MEDICINE | Facility: CLINIC | Age: 72
End: 2017-11-27

## 2017-11-27 NOTE — TELEPHONE ENCOUNTER
Tac level much better, per Klaudia Barcenas NP, in Result Note. Pt was informed by [employer] message.  Also forwarded lab result to Shabbir Jacinto RN.    Madeline Flores RN

## 2017-11-27 NOTE — TELEPHONE ENCOUNTER
----- Message from Jorgito Jacinto RN sent at 11/27/2017  4:33 PM CST -----  Regarding: Tac level 5.9, goal 3-5  No dose change for now.

## 2017-11-27 NOTE — TELEPHONE ENCOUNTER
Received call from pt's , Keshawn. He states he just received a call from the hospital and they may d/c pt home on IV abx. Keshawn is requesting a call back from a nurse.   
Spoke with Keshawn, spouse who stated patient may discharge soon.  They are considering possibility of placing a PICC for IV abx prior to discharge.  He just wanted clinic to be aware.    Routing to provider as DENNIS.    Wendie Harris RN    
negative...

## 2017-11-27 NOTE — TELEPHONE ENCOUNTER
Please see Transplant team recommendation for tac dosage, please let the family know    Klaudia MTZ

## 2017-11-28 LAB — GLUCOSE BLDC GLUCOMTR-MCNC: 151 MG/DL (ref 70–99)

## 2017-11-28 NOTE — TELEPHONE ENCOUNTER
See result note from last Tacrolimus level drawn.  Message sent to patient and spouse via [employer].  No further action required so will close.    Wendie Harris RN

## 2017-11-30 DIAGNOSIS — Z79.60 LONG-TERM USE OF IMMUNOSUPPRESSANT MEDICATION: ICD-10-CM

## 2017-11-30 DIAGNOSIS — Z94.0 KIDNEY TRANSPLANT RECIPIENT: ICD-10-CM

## 2017-11-30 DIAGNOSIS — D84.9 IMMUNOSUPPRESSED STATUS (H): ICD-10-CM

## 2017-11-30 LAB
ERYTHROCYTE [DISTWIDTH] IN BLOOD BY AUTOMATED COUNT: 14.1 % (ref 10–15)
HCT VFR BLD AUTO: 34.2 % (ref 35–47)
HGB BLD-MCNC: 10.9 G/DL (ref 11.7–15.7)
MCH RBC QN AUTO: 30.5 PG (ref 26.5–33)
MCHC RBC AUTO-ENTMCNC: 31.9 G/DL (ref 31.5–36.5)
MCV RBC AUTO: 96 FL (ref 78–100)
PLATELET # BLD AUTO: 153 10E9/L (ref 150–450)
RBC # BLD AUTO: 3.57 10E12/L (ref 3.8–5.2)
WBC # BLD AUTO: 6.2 10E9/L (ref 4–11)

## 2017-11-30 PROCEDURE — 85027 COMPLETE CBC AUTOMATED: CPT | Performed by: INTERNAL MEDICINE

## 2017-11-30 PROCEDURE — 80197 ASSAY OF TACROLIMUS: CPT | Performed by: INTERNAL MEDICINE

## 2017-11-30 PROCEDURE — 80048 BASIC METABOLIC PNL TOTAL CA: CPT | Performed by: INTERNAL MEDICINE

## 2017-11-30 PROCEDURE — 36415 COLL VENOUS BLD VENIPUNCTURE: CPT | Performed by: INTERNAL MEDICINE

## 2017-12-01 LAB
ANION GAP SERPL CALCULATED.3IONS-SCNC: 10 MMOL/L (ref 3–14)
BUN SERPL-MCNC: 45 MG/DL (ref 7–30)
CALCIUM SERPL-MCNC: 7.9 MG/DL (ref 8.5–10.1)
CHLORIDE SERPL-SCNC: 103 MMOL/L (ref 94–109)
CO2 SERPL-SCNC: 24 MMOL/L (ref 20–32)
CREAT SERPL-MCNC: 1.41 MG/DL (ref 0.52–1.04)
GFR SERPL CREATININE-BSD FRML MDRD: 37 ML/MIN/1.7M2
GLUCOSE SERPL-MCNC: 98 MG/DL (ref 70–99)
POTASSIUM SERPL-SCNC: 4.6 MMOL/L (ref 3.4–5.3)
SODIUM SERPL-SCNC: 137 MMOL/L (ref 133–144)
TACROLIMUS BLD-MCNC: 4.2 UG/L (ref 5–15)
TME LAST DOSE: ABNORMAL H

## 2017-12-06 ENCOUNTER — OFFICE VISIT (OUTPATIENT)
Dept: FAMILY MEDICINE | Facility: CLINIC | Age: 72
End: 2017-12-06
Payer: COMMERCIAL

## 2017-12-06 ENCOUNTER — OFFICE VISIT (OUTPATIENT)
Dept: PHARMACY | Facility: CLINIC | Age: 72
End: 2017-12-06
Payer: COMMERCIAL

## 2017-12-06 ENCOUNTER — OFFICE VISIT (OUTPATIENT)
Dept: BEHAVIORAL HEALTH | Facility: CLINIC | Age: 72
End: 2017-12-06
Payer: COMMERCIAL

## 2017-12-06 VITALS — OXYGEN SATURATION: 97 % | DIASTOLIC BLOOD PRESSURE: 68 MMHG | SYSTOLIC BLOOD PRESSURE: 110 MMHG | HEART RATE: 67 BPM

## 2017-12-06 DIAGNOSIS — N39.0 RECURRENT UTI: ICD-10-CM

## 2017-12-06 DIAGNOSIS — R41.82 ALTERED MENTAL STATUS, UNSPECIFIED ALTERED MENTAL STATUS TYPE: Primary | ICD-10-CM

## 2017-12-06 DIAGNOSIS — Z79.4 TYPE 2 DIABETES MELLITUS WITH DIABETIC POLYNEUROPATHY, WITH LONG-TERM CURRENT USE OF INSULIN (H): ICD-10-CM

## 2017-12-06 DIAGNOSIS — Z94.0 KIDNEY REPLACED BY TRANSPLANT: ICD-10-CM

## 2017-12-06 DIAGNOSIS — I10 BENIGN ESSENTIAL HYPERTENSION: ICD-10-CM

## 2017-12-06 DIAGNOSIS — E11.42 DIABETIC POLYNEUROPATHY ASSOCIATED WITH TYPE 2 DIABETES MELLITUS (H): ICD-10-CM

## 2017-12-06 DIAGNOSIS — E66.01 MORBID OBESITY, UNSPECIFIED OBESITY TYPE (H): ICD-10-CM

## 2017-12-06 DIAGNOSIS — Z94.0 KIDNEY REPLACED BY TRANSPLANT: Primary | ICD-10-CM

## 2017-12-06 DIAGNOSIS — E11.42 TYPE 2 DIABETES MELLITUS WITH DIABETIC POLYNEUROPATHY, WITH LONG-TERM CURRENT USE OF INSULIN (H): ICD-10-CM

## 2017-12-06 DIAGNOSIS — F43.23 ADJUSTMENT DISORDER WITH MIXED ANXIETY AND DEPRESSED MOOD: Primary | ICD-10-CM

## 2017-12-06 PROCEDURE — 99607 MTMS BY PHARM ADDL 15 MIN: CPT | Performed by: PHARMACIST

## 2017-12-06 PROCEDURE — 99350 HOME/RES VST EST HIGH MDM 60: CPT | Performed by: NURSE PRACTITIONER

## 2017-12-06 PROCEDURE — 90834 PSYTX W PT 45 MINUTES: CPT

## 2017-12-06 PROCEDURE — 99606 MTMS BY PHARM EST 15 MIN: CPT | Performed by: PHARMACIST

## 2017-12-06 NOTE — MR AVS SNAPSHOT
After Visit Summary   12/6/2017    Gem Jade    MRN: [id number]           Patient Information     Date Of Birth          1945        Visit Information        Provider Department      12/6/2017 2:00 PM Conchita [place], [facility] [place]        Today's Diagnoses     Kidney replaced by transplant    -  1    Type 2 diabetes mellitus with diabetic polyneuropathy, with long-term current use of insulin (H)          Care Instructions    See AVS from PCP encounter for details           Follow-ups after your visit        Who to contact     If you have questions or need follow up information about today's clinic visit or your schedule please contact [facility] directly at [phone].  Normal or non-critical lab and imaging results will be communicated to you by MyChart, letter or phone within 4 business days after the clinic has received the results. If you do not hear from us within 7 days, please contact the clinic through [employer] or phone. If you have a critical or abnormal lab result, we will notify you by phone as soon as possible.  Submit refill requests through [employer] or call your pharmacy and they will forward the refill request to us. Please allow 3 business days for your refill to be completed.          Additional Information About Your Visit        MyChart Information     [employer] gives you secure access to your electronic health record. If you see a primary care provider, you can also send messages to your care team and make appointments. If you have questions, please call your primary care clinic.  If you do not have a primary care provider, please call [phone] and they will assist you.        Care EveryWhere ID     This is your Care EveryWhere ID. This could be used by other organizations to access your [facility] medical records  [id number]         Blood Pressure from Last 3 Encounters:   12/06/17 110/68    11/21/17 130/70   11/19/17 127/48    Weight from Last 3 Encounters:   11/17/17 224 lb 1.6 oz (101.7 kg)   11/08/17 228 lb 2.8 oz (103.5 kg)   04/27/17 199 lb 8 oz (90.5 kg)              Today, you had the following     No orders found for display         Today's Medication Changes          These changes are accurate as of: 12/6/17 11:59 PM.  If you have any questions, ask your nurse or doctor.               These medicines have changed or have updated prescriptions.        Dose/Directions    insulin aspart 100 UNIT/ML injection   Commonly known as:  NovoLOG FLEXPEN   This may have changed:  additional instructions   Used for:  Type 2 diabetes mellitus with diabetic polyneuropathy, with long-term current use of insulin (H)        Inject 10 units under the skin 2 times daily before meals as directed. Add sliding scale as needed. Avg use 25 units per day   Quantity:  15 mL   Refills:  11                Primary Care Provider Office Phone # Fax #    Marivel Barcenas, APRN [facility] [phone]       [address] [facility] [address]  [facility] [address]        Equal Access to Services     KYLAH GREEN : Hadii jairo carranza Sokaleigh, waaxda luqadaha, qaybta kaalmada adegini, jessica aguilar . So [facility] [phone].    ATENCIÓN: Si habla español, tiene a peguero disposición servicios gratuitos de asistencia lingüística. [facility] [phone].    We comply with applicable federal civil rights laws and [place] laws. We do not discriminate on the basis of race, color, national origin, age, disability, sex, sexual orientation, or gender identity.            Thank you!     Thank you for choosing [facility]  for your care. Our goal is always to provide you with excellent care. Hearing back from our patients is one way we can continue to improve our services. Please take a few minutes to complete the written survey that you may receive in the mail after your visit  with us. Thank you!             Your Updated Medication List - Protect others around you: Learn how to safely use, store and throw away your medicines at www.disposemymeds.org.          This list is accurate as of: 12/6/17 11:59 PM.  Always use your most recent med list.                   Brand Name Dispense Instructions for use Diagnosis    ACCU-CHEK COMPACT PLUS test strip   Generic drug:  blood glucose monitoring     100 strip    Use to test blood sugar 3 times daily or as directed.  Ok to substitute alternative if insurance prefers.        ACETAMINOPHEN PO      Take 325-650 mg by mouth every 4 hours as needed.        aspirin 81 MG chewable tablet     90 tablet    Take 1 tablet (81 mg) by mouth daily Starting 1 week after your kidney biopsy    Pulmonary embolism and infarction (H), Diabetes mellitus with background retinopathy (H)       cinacalcet 30 MG tablet    SENSIPAR    60 tablet    Take 1 tablet (30 mg) by mouth daily    Kidney replaced by transplant, Hypercalcemia       cloNIDine 0.1 MG tablet    CATAPRES    180 tablet    TAKE ONE TABLET BY MOUTH TWICE A DAY    HTN, goal below 140/90       Cranberry 400 MG Tabs      Take 400 mg by mouth 2 times daily        cyanocobalamin 1000 MCG/ML injection    VITAMIN B12    3 mL    Inject 1 mL (1,000 mcg) into the muscle every 30 days    Iron deficiency anemia, unspecified iron deficiency anemia type       * diphenoxylate-atropine 2.5-0.025 MG per tablet    LOMOTIL    30 tablet    Take 1 tablet by mouth 4 times daily as needed for diarrhea    Diarrhea of presumed infectious origin       * diphenoxylate-atropine 2.5-0.025 MG per tablet    LOMOTIL    30 tablet    Take 2 tablets by mouth 4 times daily as needed for diarrhea    Diarrhea, unspecified type       famotidine 20 MG tablet    PEPCID    60 tablet    Take 1 tablet (20 mg) by mouth 2 times daily        gabapentin 300 MG capsule    NEURONTIN    180 capsule    Take 3 capsules (900 mg) by mouth 2 times daily For pain     Diabetic polyneuropathy associated with type 2 diabetes mellitus (H)       insulin aspart 100 UNIT/ML injection    NovoLOG FLEXPEN    15 mL    Inject 10 units under the skin 2 times daily before meals as directed. Add sliding scale as needed. Avg use 25 units per day    Type 2 diabetes mellitus with diabetic polyneuropathy, with long-term current use of insulin (H)       insulin glargine 100 UNIT/ML injection    LANTUS SOLOSTAR    15 mL    Inject 24 units under the skin daily.    Type 2 diabetes mellitus with diabetic polyneuropathy, with long-term current use of insulin (H)       insulin pen needle 30G X 8 MM     100 each    Use as directed with Lantus    Type 2 diabetes mellitus with diabetic nephropathy (H)       labetalol 300 MG tablet    NORMODYNE    120 tablet    Take 150 mg by mouth 2 times daily Take 1/2 tablet (150mg) =300mg. Hold for systolic BP less than 120.    Benign essential hypertension, Kidney replaced by transplant       lactobacillus rhamnosus (GG) capsule     60 capsule    Take 1 capsule by mouth 2 times daily    Diarrhea       levothyroxine 50 MCG tablet    SYNTHROID    90 tablet    Take 1 tablet (50 mcg) by mouth daily    Other specified hypothyroidism       lidocaine 2 % topical gel    XYLOCAINE    30 mL    Apply topically as needed for moderate pain 10 ML every 3 weeks    Johnson catheter in place       loratadine 10 MG tablet    CLARITIN    90 tablet    Take 1 tablet (10 mg) by mouth daily    Acute nasopharyngitis       meclizine 12.5 MG tablet    ANTIVERT    30 tablet    Take 1 tablet (12.5 mg) by mouth 3 times daily as needed for dizziness    Disorientation       ondansetron 4 MG ODT tab    ZOFRAN ODT    20 tablet    Take 1-2 tablets (4-8 mg) by mouth every 8 hours as needed for nausea    Nausea       order for DME     3 catheter    Equipment being ordered: Johnson Catheters - 18 french    Johnson catheter status       * order for DME     100 pad    Equipment being ordered: Blue Chux Please  send [facility]    Fecal incontinence       * order for DME     1 each    Equipment being ordered: Ahuja FX CPAP interface (nasal pillows), size XS.    Obstructive sleep apnea syndrome       * order for DME     12 each    Equipment being ordered: Silver Impregnated catheters HX of frequent UTI    Recurrent UTI       * order for DME     1 Device    High back guerrero wheel chair MICHELLE 99 DX: morbid obesity, generalized weakness, physical deconditioning, chronic vertigo    Morbid obesity, unspecified obesity type (H), Vertigo, Generalized muscle weakness, Physical deconditioning       pravastatin 10 MG tablet    PRAVACHOL    90 tablet    TAKE ONE TABLET BY MOUTH EVERY DAY    Hyperlipidemia LDL goal <100       predniSONE 5 MG tablet    DELTASONE    90 tablet    Take 1 tablet (5 mg) by mouth daily    Kidney replaced by transplant       simethicone 125 MG Chew chewable tablet    MYLICON    120 tablet    Take 1 tablet (125 mg) by mouth as needed for intestinal gas        tacrolimus 1 MG capsule    GENERIC EQUIVALENT    180 capsule    Take 2 capsules (2 mg) by mouth every evening (total dose 3.5 mg twice daily)    Kidney transplant recipient, Long-term use of immunosuppressant medication       TraMADol HCl 50 MG Tbdp     90 tablet    Take 50 mg by mouth 3 times daily as needed    Chronic shoulder pain, unspecified laterality       TRILEPTAL 300 MG tablet   Generic drug:  OXcarbazepine     180 tablet    Take 1 tablet (300 mg) by mouth 2 times daily For pain    Diabetic polyneuropathy associated with diabetes mellitus due to underlying condition (H)       vitamin D 2000 UNITS tablet     100 tablet    Take 4,000 Units by mouth 2 times daily    Vitamin deficiency       * Notice:  This list has 6 medication(s) that are the same as other medications prescribed for you. Read the directions carefully, and ask your doctor or other care provider to review them with you.

## 2017-12-06 NOTE — PROGRESS NOTES
SUBJECTIVE:                                                    Gem Jade is a 68 year old female who is seen in her home due to inability to leave the home unassisted, morbid obesity, severe vertigo, generalized weakness, chronic pain, bedbound, rarely able to get up in the WC, due to vertigo,  seen today for the following health issues, seen with Conchita Hu    We discussed again today that [facility] program is closing at the end of December.  Patient would be a stretcher transport at this time, cost would be around 500.00 to transport in, it appears that [facility] will not be a good fit so we will transfer her care to [facility], we will call her to schedule a phone visit in Jan.  She will continue to work on getting up in her chair and make a clinic visit and move downstairs once that occurs       KIDNEY TRANSPLANT       Duration: right kidney replaced, left one non functional     Description (location/character/radiation): chronic managed by kidney transplant team, KACI Jacinto      Intensity:  moderate    Accompanying signs and symptoms: fatigue,   History (similar episodes/previous evaluation):  kidney transplant 10/29/1999      Precipitating or alleviating factors: DM, bed bound, rarely gets up in her WC     Therapies tried and outcome: see transplant team notes, last labs done 11/30, improved, itching has resolved          Diabetes Follow-up       Patient is checking blood sugars: 1-2x daily    Diabetic concerns: frequent infections      Pt currently taking Lantus 24 units daily, Novolog 10 units with meals,about 2x daily,  generally only using sliding scale 1x daily, when  testing BS and uses sliding scale 2 units for every 50 over 201. Has been doing better with remembering Novolog  Trying to eat 2x daily, still not up untll noon, naps after dinner, then up until 4am, not checking sugars regularly, state machine broken, cleaned today and now working    Symptoms of low blood sugar? none. Frequency of  hypoglycemia? none  Recent symptoms of high blood sugar? none  Pt is not taking an ACEi/ARB. (Hx worsening renal function when on ACE/ARB therapy)  Aspirin: taking aspirin 81mg daily without problems    Diet/exercise: has been eating 2 times a day, appetite is low right now      Symptoms of hypoglycemia (low blood sugar): shaky, weak, lethargy, confusion, Symptoms occur if sugars < below 50.     Paresthesias (numbness or burning in feet) or sores: Yes hands and feet, using gabapentin with good relief     Date of last diabetic eye exam: unsure  HgbA1c 7.7 done 11/2017                   Amount of exercise or physical activity: None, bed bound, due to weakness and vertigo, now has a high back WC so she can tilt back when her vertigo gets bad, was working with PT and OT to improve strength, agrees to start up again,  hasn't had a chance to get up in her chair yet due to hospitalizations and cath issues      Problems taking medications regularly: No,  set them up and adm them    Medication side effects: none  Diet: diabetic, appetite has improved, diet improved, less simple carbs, following renal diet         Musculoskeletal problem/pain       Duration: Chronic, off and on,  generalized weakness and physical deconditioning, due to hospitalizations         Description  Location: shoulders, left the worse     Intensity:  Moderate,2-7 /10    Accompanying signs and symptoms: numbness and weakness in UE    History  Previous similar problem: YES  Previous evaluation:  x-ray, MRI and orthopedic evaluation  RIGHT SHOULDER THREE VIEWS 5/19/2013 2:23 PM  HISTORY: Rule out fracture.  COMPARISON: 9/14/2012.  FINDINGS: Again seen are degenerative changes of the  acromioclavicular joint. The right shoulder is otherwise unremarkable  and unchanged.    Precipitating or alleviating factors:  Trauma or overuse: YES- uses her arms to turn herself in bed  Aggravating factors include: any use of UE  Therapies tried and outcome: using  acetaminophen, using icy hot gel and tramadol weekly working on core strength, still using chinese patches and working well, was working with OT and PT to get up in her chair               Hypertension Follow-up       Outpatient blood pressures are being checked by [facility], and patient with a wrist device    No longer taking norvasc, or losartan, labetalol reduced to 150mg 2x daily and holding if systolic under 120    Low Salt Diet: not monitoring salt        UTI - Female  Duration of complaint:  Recent hospitalization due to UTI, currently no issues, cath patent no leaking, no indication that prophylactic ongoing antibiotic therapy will help with UTIs, discussed with patient    Mental Health Symptoms      Duration: chronic off and on, generally related to infections, currently not at baseline    Description:  Depression: YES- off and on, no meds  Anxiety: YES- with family stress and health decline  Sleep problems: YES- unusual sleep pattern, uses CPAP machine  Concentration problems: YES- struggles to focus     Therapies tried and outcome: none, discussed managing infections and anxiety reduction strategies     See PHQ-9 and LISSY scores, as appropriate       Problem list and histories reviewed & adjusted, as indicated.  Additional history: as documentedRecent Labs   Lab Test  11/30/17   1200  11/24/17   1514   11/16/17   0525  11/15/17   0505   11/14/17   0455  11/13/17   1613   07/13/17   1105   03/09/17   1310   12/05/16   1210   02/05/15   1250   05/16/13   1240   A1C   --    --    --    --    --    --   7.7*   --    --   5.6   --    --    --   7.4*   < >  6.9*   < >  7.2*   LDL   --    --    --    --    --    --    --    --    --    --    --    --    --   64   --   18   --   149*   HDL   --    --    --    --    --    --    --    --    --    --    --    --    --   46*   --   43*   --   42*   TRIG   --    --    --    --    --    --    --    --    --    --    --    --    --   272*   --   223*   --   262*   ALT   --     --    --   11  11   --    --   11   < >   --    < >   --    < >   --    < >   --    < >   --    CR  1.41*  1.41*   < >  1.79*  1.87*   < >  1.76*  2.06*   < >  1.23*   < >   --    < >   --    < >  0.67   < >  0.70   GFRESTIMATED  37*  37*   < >  28*  27*   < >  28*  24*   < >  43*   < >   --    < >   --    < >  87   < >  84   GFRESTBLACK  44*  44*   < >  34*  32*   < >  34*  29*   < >  52*   < >   --    < >   --    < >  >90   GFR Calc     < >  >90   POTASSIUM  4.6  4.4   < >  3.3*  3.8   < >   --   4.2   < >  4.3   < >   --    < >   --    < >  4.3   < >  3.9   TSH   --    --    --    --    --    --    --   0.77   --    --    --   1.20   --    --    < >   --    < >   --     < > = values in this interval not displayed.      BP Readings from Last 3 Encounters:   11/21/17 130/70   11/19/17 127/48   11/16/17 140/88    Wt Readings from Last 3 Encounters:   11/17/17 224 lb 1.6 oz (101.7 kg)   11/08/17 228 lb 2.8 oz (103.5 kg)   04/27/17 199 lb 8 oz (90.5 kg)            Labs reviewed in EPIC    Reviewed and updated as needed this visit by clinical staff       Reviewed and updated as needed this visit by Provider         ROS:  Constitutional, HEENT, cardiovascular, pulmonary, gi and gu systems are negative, except as otherwise noted.    OBJECTIVE:                                                    /68 (BP Location: Right arm, Patient Position: Supine, Cuff Size: Adult Regular)  Pulse 67  SpO2 97%  Last weight 4/2017 199lbs and 8oz  There is no height or weight on file to calculate BMI.  GENERAL: alert, mild distress, obese, elderly and laying on her back in her bed, engaged in the conversation   RESP: lungs clear to auscultation - no rales, rhonchi or wheezes  CV: regular rate and rhythm, normal S1 S2, no S3 or S4, no murmur, click or rub, no peripheral edema   ABDOMEN: soft, nontender, no masses and bowel sounds normal  MS: no gross musculoskeletal defects, limited ROM in shoulders,hips generalized  weakness noted, no edema  SKIN: no suspicious lesions or rashes  PSYCH: mentation appears normal, affect normal/bright, confusion off and on     Diagnostic Test Results:  Results for orders placed or performed in visit on 11/30/17   CBC with platelets   Result Value Ref Range    WBC 6.2 4.0 - 11.0 10e9/L    RBC Count 3.57 (L) 3.8 - 5.2 10e12/L    Hemoglobin 10.9 (L) 11.7 - 15.7 g/dL    Hematocrit 34.2 (L) 35.0 - 47.0 %    MCV 96 78 - 100 fl    MCH 30.5 26.5 - 33.0 pg    MCHC 31.9 31.5 - 36.5 g/dL    RDW 14.1 10.0 - 15.0 %    Platelet Count 153 150 - 450 10e9/L   Basic metabolic panel   Result Value Ref Range    Sodium 137 133 - 144 mmol/L    Potassium 4.6 3.4 - 5.3 mmol/L    Chloride 103 94 - 109 mmol/L    Carbon Dioxide 24 20 - 32 mmol/L    Anion Gap 10 3 - 14 mmol/L    Glucose 98 70 - 99 mg/dL    Urea Nitrogen 45 (H) 7 - 30 mg/dL    Creatinine 1.41 (H) 0.52 - 1.04 mg/dL    GFR Estimate 37 (L) >60 mL/min/1.7m2    GFR Estimate If Black 44 (L) >60 mL/min/1.7m2    Calcium 7.9 (L) 8.5 - 10.1 mg/dL   Tacrolimus level   Result Value Ref Range    Tacrolimus Last Dose Not Provided     Tacrolimus Level 4.2 (L) 5.0 - 15.0 ug/L     *Note: Due to a large number of results and/or encounters for the requested time period, some results have not been displayed. A complete set of results can be found in Results Review.        ASSESSMENT/PLAN:                                                    ASSESSMENT / PLAN:  (R41.82) Altered mental status, unspecified altered mental status type  (primary encounter diagnosis)  Comment: Still not at baseline,   Plan: monitor for infection     (E66.01) Morbid obesity, unspecified obesity type (H)  Comment: slight increase in weight gain  Plan: discussed restarting PT and OT to work on core strength     (E11.42) Diabetic polyneuropathy associated with type 2 diabetes mellitus (H)  Comment: worsened,   Plan: fixed meter today, discussed diet and exercise and checking sugars more regularly    (I10)  Benign essential hypertension  Comment: stable  Plan: maintain current meds     (N39.0) Recurrent UTI  Comment: stable,  Plan: discussed prevention     (Z94.0) Kidney replaced by transplant  Comment: tacrolimus levels WNL now   Plan: continue to monitor with transplant team    Patient Instructions   We will try to make one more home visit in 2 weeks    We will transfer your care to [facility] and make a phone visit in Jan.    No change to meds today, please check your sugars more regularly    We will check with your HC nurse to get PT and OT restarted       Marivel Barcenas, NP, APRN [facility] COM[facility]X [facility]ARE CLINIC    Visit time 60   min with > than 50% of the time spent in counseling on: care coordination, DM, kidney transplant, shoulder pain, obesity,UTI, mental confusion

## 2017-12-06 NOTE — MR AVS SNAPSHOT
After Visit Summary   12/6/2017    Gem Jade    MRN: [id number]           Patient Information     Date Of Birth          1945        Visit Information        Provider Department      12/6/2017 2:00 PM [facility]        Today's Diagnoses     Adjustment disorder with mixed anxiety and depressed mood    -  1       Follow-ups after your visit        Who to contact     If you have questions or need follow up information about today's clinic visit or your schedule please contact [facility] directly at [phone].  Normal or non-critical lab and imaging results will be communicated to you by [employer], letter or phone within 4 business days after the clinic has received the results. If you do not hear from us within 7 days, please contact the clinic through [employer] or phone. If you have a critical or abnormal lab result, we will notify you by phone as soon as possible.  Submit refill requests through [employer] or call your pharmacy and they will forward the refill request to us. Please allow 3 business days for your refill to be completed.          Additional Information About Your Visit        MyChart Information     [employer] gives you secure access to your electronic health record. If you see a primary care provider, you can also send messages to your care team and make appointments. If you have questions, please call your primary care clinic.  If you do not have a primary care provider, please call [phone] and they will assist you.        Care EveryWhere ID     This is your Care EveryWhere ID. This could be used by other organizations to access your [facility] medical records  [id number]         Blood Pressure from Last 3 Encounters:   12/06/17 110/68   11/21/17 130/70   11/19/17 127/48    Weight from Last 3 Encounters:   11/17/17 224 lb 1.6 oz (101.7 kg)   11/08/17 228 lb 2.8 oz (103.5 kg)   04/27/17 199 lb 8 oz (90.5 kg)               Today, you had the following     No orders found for display         Today's Medication Changes          These changes are accurate as of: 12/6/17 11:59 PM.  If you have any questions, ask your nurse or doctor.               These medicines have changed or have updated prescriptions.        Dose/Directions    insulin aspart 100 UNIT/ML injection   Commonly known as:  NovoLOG FLEXPEN   This may have changed:  additional instructions   Used for:  Type 2 diabetes mellitus with diabetic polyneuropathy, with long-term current use of insulin (H)        Inject 10 units under the skin 2 times daily before meals as directed. Add sliding scale as needed. Avg use 25 units per day   Quantity:  15 mL   Refills:  11                Primary Care Provider Office Phone # Fax #    Marivel Salgdao TommyangelikaElton, APRN [facility] [phone]       [address] [facility] [address]  [facility] [address]        Equal Access to Services     CARLOS ENRIQUE GREEN : Hadii aad ku hadasho Sokaleigh, waaxda luqadaha, qaybta kaalmada tony, jessica aguilar . So [facility] [phone].    ATENCIÓN: Si habla español, tiene a peguero disposición servicios gratuitos de asistencia lingüística. Carmine al [phone].    We comply with applicable federal civil rights laws and [place] laws. We do not discriminate on the basis of race, color, national origin, age, disability, sex, sexual orientation, or gender identity.            Thank you!     Thank you for choosing [facility]  for your care. Our goal is always to provide you with excellent care. Hearing back from our patients is one way we can continue to improve our services. Please take a few minutes to complete the written survey that you may receive in the mail after your visit with us. Thank you!             Your Updated Medication List - Protect others around you: Learn how to safely use, store and throw away your medicines at www.disposemymeds.org.          This list is  accurate as of: 12/6/17 11:59 PM.  Always use your most recent med list.                   Brand Name Dispense Instructions for use Diagnosis    ACCU-CHEK COMPACT PLUS test strip   Generic drug:  blood glucose monitoring     100 strip    Use to test blood sugar 3 times daily or as directed.  Ok to substitute alternative if insurance prefers.        ACETAMINOPHEN PO      Take 325-650 mg by mouth every 4 hours as needed.        aspirin 81 MG chewable tablet     90 tablet    Take 1 tablet (81 mg) by mouth daily Starting 1 week after your kidney biopsy    Pulmonary embolism and infarction (H), Diabetes mellitus with background retinopathy (H)       cinacalcet 30 MG tablet    SENSIPAR    60 tablet    Take 1 tablet (30 mg) by mouth daily    Kidney replaced by transplant, Hypercalcemia       cloNIDine 0.1 MG tablet    CATAPRES    180 tablet    TAKE ONE TABLET BY MOUTH TWICE A DAY    HTN, goal below 140/90       Cranberry 400 MG Tabs      Take 400 mg by mouth 2 times daily        cyanocobalamin 1000 MCG/ML injection    VITAMIN B12    3 mL    Inject 1 mL (1,000 mcg) into the muscle every 30 days    Iron deficiency anemia, unspecified iron deficiency anemia type       * diphenoxylate-atropine 2.5-0.025 MG per tablet    LOMOTIL    30 tablet    Take 1 tablet by mouth 4 times daily as needed for diarrhea    Diarrhea of presumed infectious origin       * diphenoxylate-atropine 2.5-0.025 MG per tablet    LOMOTIL    30 tablet    Take 2 tablets by mouth 4 times daily as needed for diarrhea    Diarrhea, unspecified type       famotidine 20 MG tablet    PEPCID    60 tablet    Take 1 tablet (20 mg) by mouth 2 times daily        gabapentin 300 MG capsule    NEURONTIN    180 capsule    Take 3 capsules (900 mg) by mouth 2 times daily For pain    Diabetic polyneuropathy associated with type 2 diabetes mellitus (H)       insulin aspart 100 UNIT/ML injection    NovoLOG FLEXPEN    15 mL    Inject 10 units under the skin 2 times daily before  meals as directed. Add sliding scale as needed. Avg use 25 units per day    Type 2 diabetes mellitus with diabetic polyneuropathy, with long-term current use of insulin (H)       insulin glargine 100 UNIT/ML injection    LANTUS SOLOSTAR    15 mL    Inject 24 units under the skin daily.    Type 2 diabetes mellitus with diabetic polyneuropathy, with long-term current use of insulin (H)       insulin pen needle 30G X 8 MM     100 each    Use as directed with Lantus    Type 2 diabetes mellitus with diabetic nephropathy (H)       labetalol 300 MG tablet    NORMODYNE    120 tablet    Take 150 mg by mouth 2 times daily Take 1/2 tablet (150mg) =300mg. Hold for systolic BP less than 120.    Benign essential hypertension, Kidney replaced by transplant       lactobacillus rhamnosus (GG) capsule     60 capsule    Take 1 capsule by mouth 2 times daily    Diarrhea       lidocaine 2 % topical gel    XYLOCAINE    30 mL    Apply topically as needed for moderate pain 10 ML every 3 weeks    Johnson catheter in place       loratadine 10 MG tablet    CLARITIN    90 tablet    Take 1 tablet (10 mg) by mouth daily    Acute nasopharyngitis       meclizine 12.5 MG tablet    ANTIVERT    30 tablet    Take 1 tablet (12.5 mg) by mouth 3 times daily as needed for dizziness    Disorientation       ondansetron 4 MG ODT tab    ZOFRAN ODT    20 tablet    Take 1-2 tablets (4-8 mg) by mouth every 8 hours as needed for nausea    Nausea       order for DME     3 catheter    Equipment being ordered: Johnson Catheters - 18 french    Johnson catheter status       * order for DME     100 pad    Equipment being ordered: Blue Chux [facility] send to [facility]    Fecal incontinence       * order for DME     1 each    Equipment being ordered: SocialGuide FX CPAP interface (nasal pillows), size XS.    Obstructive sleep apnea syndrome       * order for DME     12 each    Equipment being ordered: Silver Impregnated catheters HX of frequent UTI    Recurrent UTI       * order  for DME     1 Device    High back guerrero wheel chair MICHELLE 99 DX: morbid obesity, generalized weakness, physical deconditioning, chronic vertigo    Morbid obesity, unspecified obesity type (H), Vertigo, Generalized muscle weakness, Physical deconditioning       pravastatin 10 MG tablet    PRAVACHOL    90 tablet    TAKE ONE TABLET BY MOUTH EVERY DAY    Hyperlipidemia LDL goal <100       predniSONE 5 MG tablet    DELTASONE    90 tablet    Take 1 tablet (5 mg) by mouth daily    Kidney replaced by transplant       simethicone 125 MG Chew chewable tablet    MYLICON    120 tablet    Take 1 tablet (125 mg) by mouth as needed for intestinal gas        tacrolimus 1 MG capsule    GENERIC EQUIVALENT    180 capsule    Take 2 capsules (2 mg) by mouth every evening (total dose 3.5 mg twice daily)    Kidney transplant recipient, Long-term use of immunosuppressant medication       TraMADol HCl 50 MG Tbdp     90 tablet    Take 50 mg by mouth 3 times daily as needed    Chronic shoulder pain, unspecified laterality       TRILEPTAL 300 MG tablet   Generic drug:  OXcarbazepine     180 tablet    Take 1 tablet (300 mg) by mouth 2 times daily For pain    Diabetic polyneuropathy associated with diabetes mellitus due to underlying condition (H)       vitamin D 2000 UNITS tablet     100 tablet    Take 4,000 Units by mouth 2 times daily    Vitamin deficiency       * Notice:  This list has 6 medication(s) that are the same as other medications prescribed for you. Read the directions carefully, and ask your doctor or other care provider to review them with you.

## 2017-12-06 NOTE — PROGRESS NOTES
[facility]  December 6, 2017      Behavioral Health Clinician Progress Note    Patient Name: Gem Jade           Service Type:  Family with client present      Service Location:   Face to Face in Home / Community     Session Start Time: 2PM  Session End Time: 2:50pm      Session Length: 38 - 52      Attendees: Patient, PCP, Spouse / Significant Other and MTM    Visit Activities (Refresh list every visit): [facility] [employer]    Diagnostic Assessment Date: 3/22/2013  Treatment Plan Review Date: 4/17/17  See Flowsheets for today's PHQ-9 and LISSY-7 results  Previous PHQ-9: No flowsheet data found.  Previous LISSY-7: No flowsheet data found.    JUAN MANUEL LEVEL:  No flowsheet data found.    DATA  Extended Session (60+ minutes): No  Interactive Complexity: No  Crisis: No  [facility] Patient: No    Treatment Objective(s) Addressed in This Session:  Target Behavior(s): disease management/lifestyle changes      Depressed Mood: Decrease frequency and intensity of feeling down, depressed, hopeless  Anxiety: will experience a reduction in anxiety    Current Stressors / Issues:  MCI, PCP, MTM, Pt and Pt's  were present in Pt's home for a co-visit. The focus of today's visit is on transition planning for primary care. Patient reports she has been easily confused as of late, and that she is having trouble keeping track of things. Pt states that this confusion causes her stress and worry. Pt and MCI discussed adaptive coping skills to manage and reduce stress.   Pt and her  expressed distress that the [facility] was closing, though were pleased to hear that Pt would be able to continue primary care with her current PCP in the [facility]. Pt will work on increasing physical strength so she can get into a wheelchair.    Pt declined in-home therapy at this time and will meet with a [facility] as needed on an outpatient basis.    Progress on Treatment Objective(s) / Homework:  Stable - CONTEMPLATION (Considering change and  yet undecided); Intervened by assessing the negative and positive thinking (ambivalence) about behavior change    Motivational Interviewing    MI Intervention: Expressed Empathy/Understanding, Supported Autonomy, Collaboration, Evocation, Permission to raise concern or advise, Open-ended questions, Reflections: simple and complex, Change talk (evoked) and Reframe     Change Talk Expressed by the Patient: Desire to change    Provider Response to Change Talk: E - Evoked more info from patient about behavior change, A - Affirmed patient's thoughts, decisions, or attempts at behavior change, R - Reflected patient's change talk and S - Summarized patient's change talk statements        Care Plan review completed: No    Medication Review:  No changes to current psychiatric medication(s)    Medication Compliance:  No    Changes in Health Issues:   None reported    Chemical Use Review:   Substance Use: Chemical use reviewed, no active concerns identified      Tobacco Use: No current tobacco use.      Assessment: Current Emotional / Mental Status (status of significant symptoms):  Risk status (Self / Other harm or suicidal ideation)  Patient denies a history of suicidal ideation, suicide attempts, self-injurious behavior, homicidal ideation, homicidal behavior and and other safety concerns  Patient denies current fears or concerns for personal safety.  Patient denies current or recent suicidal ideation or behaviors.  Patient denies current or recent homicidal ideation or behaviors.  Patient denies current or recent self injurious behavior or ideation.  Patient denies other safety concerns.  A safety and risk management plan has not been developed at this time, however patient was encouraged to call [facility] should there be a change in any of these risk factors.    Appearance:   Appropriate   Eye Contact:   Good   Psychomotor Behavior: Normal   Attitude:   Cooperative   Orientation:   All  Speech   Rate /  Production: Normal    Volume:  Normal   Mood:    Sad   Affect:    Appropriate   Thought Content:  Clear   Thought Form:  Coherent  Logical   Insight:    Fair     Provisional Diagnoses:  1. Adjustment disorder with mixed anxiety and depressed mood        Collateral Reports Completed:  Not Applicable    Plan: (Homework, other):  Patient was given information about behavioral services and encouraged to schedule a follow up appointment with the clinic [facility] as needed.  She was also given information about mental health symptoms and treatment options .  CD Recommendations: No indications of CD issues.  Prieto MILLS Clinical Intern     Reviewed/supervised by: GENE Valerio, [facility] Center[facility]

## 2017-12-06 NOTE — Clinical Note
Please schedule with me the week of 12/20 Please contact her [facility] and see if we can get PT and OT restarted

## 2017-12-06 NOTE — MR AVS SNAPSHOT
After Visit Summary   12/6/2017    Gem Jade    MRN: [id number]           Patient Information     Date Of Birth          1945        Visit Information        Provider Department      12/6/2017 2:00 PM Marivel Barcenas APRN CNP [facility]        Today's Diagnoses     Altered mental status, unspecified altered mental status type    -  1    Morbid obesity, unspecified obesity type (H)        Diabetic polyneuropathy associated with type 2 diabetes mellitus (H)        Benign essential hypertension        Recurrent UTI        Kidney replaced by transplant          Care Instructions    We will try to make one more home visit in 2 weeks    We will transfer your care to [facility] and make a phone visit in Jan.    No change to meds today, please check your sugars more regularly    We will check with your [facility] nurse to get PT and OT restarted               Follow-ups after your visit        Who to contact     If you have questions or need follow up information about today's clinic visit or your schedule please contact [facility] directly at [phone].  Normal or non-critical lab and imaging results will be communicated to you by [employer], letter or phone within 4 business days after the clinic has received the results. If you do not hear from us within 7 days, please contact the clinic through [employer] or phone. If you have a critical or abnormal lab result, we will notify you by phone as soon as possible.  Submit refill requests through [employer] or call your pharmacy and they will forward the refill request to us. Please allow 3 business days for your refill to be completed.          Additional Information About Your Visit        Drive [employer] Information     [employer] gives you secure access to your electronic health record. If you see a primary care provider, you can also send messages to your care team and make appointments. If you have questions, please call your  primary care clinic.  If you do not have a primary care provider, please call [phone] and they will assist you.        Care EveryWhere ID     This is your Care EveryWhere ID. This could be used by other organizations to access your [facility] medical records  [id number]        Your Vitals Were     Pulse Pulse Oximetry                67 97%           Blood Pressure from Last 3 Encounters:   12/06/17 110/68   11/21/17 130/70   11/19/17 127/48    Weight from Last 3 Encounters:   11/17/17 224 lb 1.6 oz (101.7 kg)   11/08/17 228 lb 2.8 oz (103.5 kg)   04/27/17 199 lb 8 oz (90.5 kg)              Today, you had the following     No orders found for display         Today's Medication Changes          These changes are accurate as of: 12/6/17 11:59 PM.  If you have any questions, ask your nurse or doctor.               These medicines have changed or have updated prescriptions.        Dose/Directions    insulin aspart 100 UNIT/ML injection   Commonly known as:  NovoLOG FLEXPEN   This may have changed:  additional instructions   Used for:  Type 2 diabetes mellitus with diabetic polyneuropathy, with long-term current use of insulin (H)        Inject 10 units under the skin 2 times daily before meals as directed. Add sliding scale as needed. Avg use 25 units per day   Quantity:  15 mL   Refills:  11                Primary Care Provider Office Phone # Fax #    Marivel Salgado TommyangelikaKeilyJerome, APRN [facility] [phone]       [address] [facility] [address]  [facility] [address]        Equal Access to Services     CARLOS ENRIQUE GREEN AH: Hadii jairo ku hadasho Soomaali, waaxda luqadaha, qaybta kaalmada adeegyada, jessica aguilar . So [facility] [phone].    ATENCIÓN: Si habla español, tiene a peguero disposición servicios gratuitos de asistencia lingüística. Llame al [phone].    We comply with applicable federal civil rights laws and [place] laws. We do not discriminate on the basis of race, color, national origin,  age, disability, sex, sexual orientation, or gender identity.            Thank you!     Thank you for choosing [facility]  for your care. Our goal is always to provide you with excellent care. Hearing back from our patients is one way we can continue to improve our services. Please take a few minutes to complete the written survey that you may receive in the mail after your visit with us. Thank you!             Your Updated Medication List - Protect others around you: Learn how to safely use, store and throw away your medicines at www.disposemymeds.org.          This list is accurate as of: 12/6/17 11:59 PM.  Always use your most recent med list.                   Brand Name Dispense Instructions for use Diagnosis    ACCU-CHEK COMPACT PLUS test strip   Generic drug:  blood glucose monitoring     100 strip    Use to test blood sugar 3 times daily or as directed.  Ok to substitute alternative if insurance prefers.        ACETAMINOPHEN PO      Take 325-650 mg by mouth every 4 hours as needed.        aspirin 81 MG chewable tablet     90 tablet    Take 1 tablet (81 mg) by mouth daily Starting 1 week after your kidney biopsy    Pulmonary embolism and infarction (H), Diabetes mellitus with background retinopathy (H)       cinacalcet 30 MG tablet    SENSIPAR    60 tablet    Take 1 tablet (30 mg) by mouth daily    Kidney replaced by transplant, Hypercalcemia       cloNIDine 0.1 MG tablet    CATAPRES    180 tablet    TAKE ONE TABLET BY MOUTH TWICE A DAY    HTN, goal below 140/90       Cranberry 400 MG Tabs      Take 400 mg by mouth 2 times daily        cyanocobalamin 1000 MCG/ML injection    VITAMIN B12    3 mL    Inject 1 mL (1,000 mcg) into the muscle every 30 days    Iron deficiency anemia, unspecified iron deficiency anemia type       * diphenoxylate-atropine 2.5-0.025 MG per tablet    LOMOTIL    30 tablet    Take 1 tablet by mouth 4 times daily as needed for diarrhea    Diarrhea of presumed infectious  origin       * diphenoxylate-atropine 2.5-0.025 MG per tablet    LOMOTIL    30 tablet    Take 2 tablets by mouth 4 times daily as needed for diarrhea    Diarrhea, unspecified type       famotidine 20 MG tablet    PEPCID    60 tablet    Take 1 tablet (20 mg) by mouth 2 times daily        gabapentin 300 MG capsule    NEURONTIN    180 capsule    Take 3 capsules (900 mg) by mouth 2 times daily For pain    Diabetic polyneuropathy associated with type 2 diabetes mellitus (H)       insulin aspart 100 UNIT/ML injection    NovoLOG FLEXPEN    15 mL    Inject 10 units under the skin 2 times daily before meals as directed. Add sliding scale as needed. Avg use 25 units per day    Type 2 diabetes mellitus with diabetic polyneuropathy, with long-term current use of insulin (H)       insulin glargine 100 UNIT/ML injection    LANTUS SOLOSTAR    15 mL    Inject 24 units under the skin daily.    Type 2 diabetes mellitus with diabetic polyneuropathy, with long-term current use of insulin (H)       insulin pen needle 30G X 8 MM     100 each    Use as directed with Lantus    Type 2 diabetes mellitus with diabetic nephropathy (H)       labetalol 300 MG tablet    NORMODYNE    120 tablet    Take 150 mg by mouth 2 times daily Take 1/2 tablet (150mg) =300mg. Hold for systolic BP less than 120.    Benign essential hypertension, Kidney replaced by transplant       lactobacillus rhamnosus (GG) capsule     60 capsule    Take 1 capsule by mouth 2 times daily    Diarrhea       levothyroxine 50 MCG tablet    SYNTHROID    90 tablet    Take 1 tablet (50 mcg) by mouth daily    Other specified hypothyroidism       lidocaine 2 % topical gel    XYLOCAINE    30 mL    Apply topically as needed for moderate pain 10 ML every 3 weeks    Johnson catheter in place       loratadine 10 MG tablet    CLARITIN    90 tablet    Take 1 tablet (10 mg) by mouth daily    Acute nasopharyngitis       meclizine 12.5 MG tablet    ANTIVERT    30 tablet    Take 1 tablet (12.5 mg)  by mouth 3 times daily as needed for dizziness    Disorientation       ondansetron 4 MG ODT tab    ZOFRAN ODT    20 tablet    Take 1-2 tablets (4-8 mg) by mouth every 8 hours as needed for nausea    Nausea       order for DME     3 catheter    Equipment being ordered: Johnson Catheters - 18 french    Johnson catheter status       * order for DME     100 pad    Equipment being ordered: Blue Chux Please send to [facility]    Fecal incontinence       * order for DME     1 each    Equipment being ordered: Ahuja FX CPAP interface (nasal pillows), size XS.    Obstructive sleep apnea syndrome       * order for DME     12 each    Equipment being ordered: Silver Impregnated catheters HX of frequent UTI    Recurrent UTI       * order for DME     1 Device    High back guerrero wheel chair MICHELLE 99 DX: morbid obesity, generalized weakness, physical deconditioning, chronic vertigo    Morbid obesity, unspecified obesity type (H), Vertigo, Generalized muscle weakness, Physical deconditioning       pravastatin 10 MG tablet    PRAVACHOL    90 tablet    TAKE ONE TABLET BY MOUTH EVERY DAY    Hyperlipidemia LDL goal <100       predniSONE 5 MG tablet    DELTASONE    90 tablet    Take 1 tablet (5 mg) by mouth daily    Kidney replaced by transplant       simethicone 125 MG Chew chewable tablet    MYLICON    120 tablet    Take 1 tablet (125 mg) by mouth as needed for intestinal gas        tacrolimus 1 MG capsule    GENERIC EQUIVALENT    180 capsule    Take 2 capsules (2 mg) by mouth every evening (total dose 3.5 mg twice daily)    Kidney transplant recipient, Long-term use of immunosuppressant medication       TraMADol HCl 50 MG Tbdp     90 tablet    Take 50 mg by mouth 3 times daily as needed    Chronic shoulder pain, unspecified laterality       TRILEPTAL 300 MG tablet   Generic drug:  OXcarbazepine     180 tablet    Take 1 tablet (300 mg) by mouth 2 times daily For pain    Diabetic polyneuropathy associated with diabetes mellitus due to  underlying condition (H)       vitamin D 2000 UNITS tablet     100 tablet    Take 4,000 Units by mouth 2 times daily    Vitamin deficiency       * Notice:  This list has 6 medication(s) that are the same as other medications prescribed for you. Read the directions carefully, and ask your doctor or other care provider to review them with you.

## 2017-12-11 DIAGNOSIS — D84.9 IMMUNOSUPPRESSED STATUS (H): ICD-10-CM

## 2017-12-11 DIAGNOSIS — Z79.60 LONG-TERM USE OF IMMUNOSUPPRESSANT MEDICATION: ICD-10-CM

## 2017-12-11 DIAGNOSIS — Z94.0 KIDNEY TRANSPLANT RECIPIENT: ICD-10-CM

## 2017-12-11 LAB
ERYTHROCYTE [DISTWIDTH] IN BLOOD BY AUTOMATED COUNT: 14.3 % (ref 10–15)
HCT VFR BLD AUTO: 33.7 % (ref 35–47)
HGB BLD-MCNC: 10.6 G/DL (ref 11.7–15.7)
MCH RBC QN AUTO: 29.9 PG (ref 26.5–33)
MCHC RBC AUTO-ENTMCNC: 31.5 G/DL (ref 31.5–36.5)
MCV RBC AUTO: 95 FL (ref 78–100)
PLATELET # BLD AUTO: 166 10E9/L (ref 150–450)
RBC # BLD AUTO: 3.54 10E12/L (ref 3.8–5.2)
WBC # BLD AUTO: 6.3 10E9/L (ref 4–11)

## 2017-12-11 PROCEDURE — 85027 COMPLETE CBC AUTOMATED: CPT | Performed by: INTERNAL MEDICINE

## 2017-12-11 PROCEDURE — 80197 ASSAY OF TACROLIMUS: CPT | Performed by: INTERNAL MEDICINE

## 2017-12-11 PROCEDURE — 36415 COLL VENOUS BLD VENIPUNCTURE: CPT | Performed by: INTERNAL MEDICINE

## 2017-12-11 PROCEDURE — 80048 BASIC METABOLIC PNL TOTAL CA: CPT | Performed by: INTERNAL MEDICINE

## 2017-12-12 ENCOUNTER — TELEPHONE (OUTPATIENT)
Dept: FAMILY MEDICINE | Facility: CLINIC | Age: 72
End: 2017-12-12

## 2017-12-12 DIAGNOSIS — E83.52 HYPERCALCEMIA: ICD-10-CM

## 2017-12-12 DIAGNOSIS — Z94.0 KIDNEY REPLACED BY TRANSPLANT: ICD-10-CM

## 2017-12-12 LAB
ANION GAP SERPL CALCULATED.3IONS-SCNC: 10 MMOL/L (ref 3–14)
BUN SERPL-MCNC: 48 MG/DL (ref 7–30)
CALCIUM SERPL-MCNC: 8.3 MG/DL (ref 8.5–10.1)
CHLORIDE SERPL-SCNC: 100 MMOL/L (ref 94–109)
CO2 SERPL-SCNC: 24 MMOL/L (ref 20–32)
CREAT SERPL-MCNC: 1.35 MG/DL (ref 0.52–1.04)
GFR SERPL CREATININE-BSD FRML MDRD: 39 ML/MIN/1.7M2
GLUCOSE SERPL-MCNC: 62 MG/DL (ref 70–99)
POTASSIUM SERPL-SCNC: 3.8 MMOL/L (ref 3.4–5.3)
SODIUM SERPL-SCNC: 134 MMOL/L (ref 133–144)
TACROLIMUS BLD-MCNC: 3.8 UG/L (ref 5–15)
TME LAST DOSE: ABNORMAL H

## 2017-12-12 RX ORDER — CINACALCET 30 MG/1
30 TABLET, FILM COATED ORAL DAILY
Qty: 60 TABLET | Refills: 3 | OUTPATIENT
Start: 2017-12-12

## 2017-12-12 NOTE — PATIENT INSTRUCTIONS
We will try to make one more home visit in 2 weeks    We will transfer your care to [facility] and make a phone visit in Jan.    No change to meds today, please check your sugars more regularly    We will check with your [facility] nurse to get PT and OT restarted

## 2017-12-12 NOTE — TELEPHONE ENCOUNTER
Per Klaudia Barcenas NP, gave verbal orders to [facility] case manager, for PT & OT eval and treat.    Madeline Flores RN

## 2017-12-13 ENCOUNTER — TELEPHONE (OUTPATIENT)
Dept: FAMILY MEDICINE | Facility: CLINIC | Age: 72
End: 2017-12-13

## 2017-12-13 NOTE — TELEPHONE ENCOUNTER
Mailed letter and information regarding [facility] to patient's home.  [facility] may be able to provide some level of in home primary care when [facility] Care closes at the end of 2017.

## 2017-12-13 NOTE — TELEPHONE ENCOUNTER
Reason for Call:  Orders for a nurse visit     Detailed comments: Lauryn with [facility] home [facility] need verbal orders for a one time nursing visit to re-cert.     Phone Number Patient can be reached at: Other phone number:  Lauryn  [phone]    Best Time: anytime    Can we leave a detailed message on this number? YES    Call taken on 12/13/2017 at 10:00 AM by Rich Henderson

## 2017-12-13 NOTE — TELEPHONE ENCOUNTER
Returned Yashira's call and left vmail with okay for order below, per Klaudia Barcenas NP.    Madeline Flores RN

## 2017-12-14 DIAGNOSIS — Z94.0 KIDNEY REPLACED BY TRANSPLANT: ICD-10-CM

## 2017-12-14 DIAGNOSIS — E83.52 HYPERCALCEMIA: ICD-10-CM

## 2017-12-14 RX ORDER — CINACALCET 30 MG/1
30 TABLET, FILM COATED ORAL DAILY
Qty: 60 TABLET | Refills: 3 | Status: CANCELLED | OUTPATIENT
Start: 2017-12-14

## 2017-12-15 ENCOUNTER — TELEPHONE (OUTPATIENT)
Dept: FAMILY MEDICINE | Facility: CLINIC | Age: 72
End: 2017-12-15

## 2017-12-15 NOTE — TELEPHONE ENCOUNTER
Reason for Call:  Orders for nursing visit    Detailed comments: Yashira with  home care requesting verbal ok to continue with nursing visit for cath changes.     Phone Number Patient can be reached at: Other phone number:  Yashira  [phone]    Best Time: anytime    Can we leave a detailed message on this number? YES    Call taken on 12/15/2017 at 3:50 PM by Rich Henderson

## 2017-12-15 NOTE — TELEPHONE ENCOUNTER
Returned Yashira's call and gave verbal okay for home care orders below, per Klaudia Barcenas NP.    Madeline Flores RN

## 2017-12-18 NOTE — TELEPHONE ENCOUNTER
Reviewed chart re: cinacalcet per PCP request.    Nephrology hosp note 4/22/17:  6. Hypercalcemia - mildly elevated serum calcium, likely due to prolonged immobilization, as well as hyperparathyroidism.  Because of intermittent AMS, would consider treating hypercalcemia to rule this out as a cause.  Would start cinacalcet 30 mg daily.  7. AMS - appears to wax and wane some.  Will treat high normal/hypercalcemia to rule this out as a cause.  Could consider weaning off Clonidine.  Continue to treat infection.    Discharge summary 4/28/17:  # Hypercalcemia -Resolved  - Pt had hypercalcemia when corrected for her low albumin. Received lasix 20 mg IV for this on 4/22 and 4/23. Improved to normal levels. Unlikely it was contributing to her altered behavior.  She was started on cinacalcet with continued resolution of hypercalcemia.   - Cinacalcet 30 mg PO qday    Reviewed calcium levels  12/11/17 8.3  11/30/17 7.9  11/16 17 7.7 and albumin 2.7 (corrected calcium 8.7)    Last PTH: 334pg/mL on 3/11/17    Per Uptodate:  The KDIGO 2017 guideline does not provide recommendations on the use of cinacalcet [2]. Prior KDIGO guidelines suggested that cinacalcet not be used given the paucity of data concerning efficacy and safety in predialysis patients with CKD [20].    Unclear as to the plan for ongoing use of cinacalcet. Pt has been taking this medication ongoing without clear recommendation to discontinue. Unclear why transplant specialist is deferring to PCP for recommendation regarding refill.   With the numerous times pt has had AMS with infection, agree that hypercalcemia was not a contributor to altered behavior in the April hospitalization above when cinacalcet was started.    Consider discontinuation if possible to have lab draw for hypercalcemia (calcium and albumin) in 1 month.  Due to closure of [facility], unclear if this would be an option for [facility]CR[facility] to draw blood since pt is a difficult stick.    If unable to draw  labs, would ask PCP to contact Dr Elizabeth directly to review recommendations for ongoing need of cinacalcet.     Conchita Toledo, VivianD, BCACP

## 2017-12-19 ENCOUNTER — APPOINTMENT (OUTPATIENT)
Dept: GENERAL RADIOLOGY | Facility: CLINIC | Age: 72
DRG: 871 | End: 2017-12-19
Attending: INTERNAL MEDICINE
Payer: MEDICARE

## 2017-12-19 ENCOUNTER — APPOINTMENT (OUTPATIENT)
Dept: CT IMAGING | Facility: CLINIC | Age: 72
DRG: 871 | End: 2017-12-19
Attending: EMERGENCY MEDICINE
Payer: MEDICARE

## 2017-12-19 ENCOUNTER — APPOINTMENT (OUTPATIENT)
Dept: GENERAL RADIOLOGY | Facility: CLINIC | Age: 72
DRG: 871 | End: 2017-12-19
Attending: EMERGENCY MEDICINE
Payer: MEDICARE

## 2017-12-19 ENCOUNTER — HOSPITAL ENCOUNTER (INPATIENT)
Facility: CLINIC | Age: 72
LOS: 6 days | Discharge: HOME HEALTH | DRG: 871 | End: 2017-12-25
Attending: EMERGENCY MEDICINE | Admitting: INTERNAL MEDICINE
Payer: MEDICARE

## 2017-12-19 DIAGNOSIS — R03.0 ELEVATED BLOOD PRESSURE READING: ICD-10-CM

## 2017-12-19 DIAGNOSIS — J96.00 ACUTE RESPIRATORY FAILURE, UNSPECIFIED WHETHER WITH HYPOXIA OR HYPERCAPNIA (H): ICD-10-CM

## 2017-12-19 DIAGNOSIS — E03.9 HYPOTHYROIDISM, UNSPECIFIED TYPE: ICD-10-CM

## 2017-12-19 DIAGNOSIS — Z79.60 LONG-TERM USE OF IMMUNOSUPPRESSANT MEDICATION: ICD-10-CM

## 2017-12-19 DIAGNOSIS — I26.99 PULMONARY EMBOLISM AND INFARCTION (H): ICD-10-CM

## 2017-12-19 DIAGNOSIS — G93.40 ENCEPHALOPATHY: ICD-10-CM

## 2017-12-19 DIAGNOSIS — R19.7 DIARRHEA OF PRESUMED INFECTIOUS ORIGIN: ICD-10-CM

## 2017-12-19 DIAGNOSIS — Z94.0 KIDNEY REPLACED BY TRANSPLANT: Primary | ICD-10-CM

## 2017-12-19 DIAGNOSIS — A49.1: ICD-10-CM

## 2017-12-19 DIAGNOSIS — N39.0 RECURRENT UTI: ICD-10-CM

## 2017-12-19 DIAGNOSIS — M54.9 BACK PAIN, UNSPECIFIED BACK LOCATION, UNSPECIFIED BACK PAIN LATERALITY, UNSPECIFIED CHRONICITY: ICD-10-CM

## 2017-12-19 DIAGNOSIS — I10 BENIGN ESSENTIAL HYPERTENSION: ICD-10-CM

## 2017-12-19 DIAGNOSIS — T83.511D URINARY TRACT INFECTION ASSOCIATED WITH INDWELLING URETHRAL CATHETER, SUBSEQUENT ENCOUNTER: ICD-10-CM

## 2017-12-19 DIAGNOSIS — Z16.21: ICD-10-CM

## 2017-12-19 DIAGNOSIS — Z94.0 KIDNEY TRANSPLANT RECIPIENT: ICD-10-CM

## 2017-12-19 DIAGNOSIS — R53.81 PHYSICAL DECONDITIONING: ICD-10-CM

## 2017-12-19 DIAGNOSIS — E78.5 HYPERLIPIDEMIA LDL GOAL <100: ICD-10-CM

## 2017-12-19 DIAGNOSIS — N39.0 URINARY TRACT INFECTION ASSOCIATED WITH INDWELLING URETHRAL CATHETER, SUBSEQUENT ENCOUNTER: ICD-10-CM

## 2017-12-19 DIAGNOSIS — E11.42 TYPE 2 DIABETES MELLITUS WITH DIABETIC POLYNEUROPATHY, WITH LONG-TERM CURRENT USE OF INSULIN (H): ICD-10-CM

## 2017-12-19 DIAGNOSIS — Z79.4 TYPE 2 DIABETES MELLITUS WITH DIABETIC POLYNEUROPATHY, WITH LONG-TERM CURRENT USE OF INSULIN (H): ICD-10-CM

## 2017-12-19 DIAGNOSIS — R40.2432 GLASGOW COMA SCALE TOTAL SCORE 3-8, AT ARRIVAL TO EMERGENCY DEPARTMENT (H): ICD-10-CM

## 2017-12-19 DIAGNOSIS — E66.01 MORBID OBESITY, UNSPECIFIED OBESITY TYPE (H): ICD-10-CM

## 2017-12-19 DIAGNOSIS — E11.3299 DIABETES MELLITUS WITH BACKGROUND RETINOPATHY (H): ICD-10-CM

## 2017-12-19 DIAGNOSIS — D50.9 IRON DEFICIENCY ANEMIA, UNSPECIFIED IRON DEFICIENCY ANEMIA TYPE: ICD-10-CM

## 2017-12-19 DIAGNOSIS — R19.7 DIARRHEA, UNSPECIFIED TYPE: ICD-10-CM

## 2017-12-19 LAB
ALBUMIN SERPL-MCNC: 3 G/DL (ref 3.4–5)
ALBUMIN UR-MCNC: 100 MG/DL
ALP SERPL-CCNC: 83 U/L (ref 40–150)
ALT SERPL W P-5'-P-CCNC: 21 U/L (ref 0–50)
AMPHETAMINES UR QL SCN: NEGATIVE
ANION GAP SERPL CALCULATED.3IONS-SCNC: 4 MMOL/L (ref 3–14)
APAP SERPL-MCNC: <2 MG/L (ref 10–20)
APPEARANCE UR: ABNORMAL
AST SERPL W P-5'-P-CCNC: 16 U/L (ref 0–45)
BACTERIA #/AREA URNS HPF: ABNORMAL /HPF
BARBITURATES UR QL: NEGATIVE
BASOPHILS # BLD AUTO: 0 10E9/L (ref 0–0.2)
BASOPHILS NFR BLD AUTO: 0.4 %
BENZODIAZ UR QL: NEGATIVE
BILIRUB SERPL-MCNC: 0.4 MG/DL (ref 0.2–1.3)
BILIRUB UR QL STRIP: NEGATIVE
BUN SERPL-MCNC: 38 MG/DL (ref 7–30)
CALCIUM SERPL-MCNC: 8.5 MG/DL (ref 8.5–10.1)
CANNABINOIDS UR QL SCN: NEGATIVE
CHLORIDE SERPL-SCNC: 104 MMOL/L (ref 94–109)
CO2 BLD-SCNC: 27 MMOL/L (ref 21–28)
CO2 BLDCOV-SCNC: 29 MMOL/L (ref 21–28)
CO2 SERPL-SCNC: 30 MMOL/L (ref 20–32)
COCAINE UR QL: NEGATIVE
COLOR UR AUTO: ABNORMAL
CREAT SERPL-MCNC: 1.21 MG/DL (ref 0.52–1.04)
DIFFERENTIAL METHOD BLD: ABNORMAL
EOSINOPHIL # BLD AUTO: 0.3 10E9/L (ref 0–0.7)
EOSINOPHIL NFR BLD AUTO: 4.2 %
ERYTHROCYTE [DISTWIDTH] IN BLOOD BY AUTOMATED COUNT: 14 % (ref 10–15)
GFR SERPL CREATININE-BSD FRML MDRD: 44 ML/MIN/1.7M2
GLUCOSE BLDC GLUCOMTR-MCNC: 120 MG/DL (ref 70–99)
GLUCOSE BLDC GLUCOMTR-MCNC: 130 MG/DL (ref 70–99)
GLUCOSE BLDC GLUCOMTR-MCNC: 141 MG/DL (ref 70–99)
GLUCOSE SERPL-MCNC: 158 MG/DL (ref 70–99)
GLUCOSE UR STRIP-MCNC: 100 MG/DL
HBA1C MFR BLD: 6.7 % (ref 4.3–6)
HCT VFR BLD AUTO: 36.2 % (ref 35–47)
HGB BLD-MCNC: 11.6 G/DL (ref 11.7–15.7)
HGB UR QL STRIP: ABNORMAL
IMM GRANULOCYTES # BLD: 0 10E9/L (ref 0–0.4)
IMM GRANULOCYTES NFR BLD: 0.1 %
INTERPRETATION ECG - MUSE: NORMAL
KETONES UR STRIP-MCNC: NEGATIVE MG/DL
LACTATE BLD-SCNC: 0.6 MMOL/L (ref 0.7–2.1)
LACTATE BLD-SCNC: 0.8 MMOL/L (ref 0.7–2)
LEUKOCYTE ESTERASE UR QL STRIP: ABNORMAL
LYMPHOCYTES # BLD AUTO: 1.5 10E9/L (ref 0.8–5.3)
LYMPHOCYTES NFR BLD AUTO: 21 %
MCH RBC QN AUTO: 30.4 PG (ref 26.5–33)
MCHC RBC AUTO-ENTMCNC: 32 G/DL (ref 31.5–36.5)
MCV RBC AUTO: 95 FL (ref 78–100)
MONOCYTES # BLD AUTO: 0.5 10E9/L (ref 0–1.3)
MONOCYTES NFR BLD AUTO: 7.5 %
NEUTROPHILS # BLD AUTO: 4.6 10E9/L (ref 1.6–8.3)
NEUTROPHILS NFR BLD AUTO: 66.8 %
NITRATE UR QL: NEGATIVE
NON-SQ EPI CELLS #/AREA URNS LPF: ABNORMAL /LPF
NRBC # BLD AUTO: 0 10*3/UL
NRBC BLD AUTO-RTO: 0 /100
OPIATES UR QL SCN: NEGATIVE
PCO2 BLD: 48 MM HG (ref 35–45)
PCO2 BLDV: 55 MM HG (ref 40–50)
PCP UR QL SCN: NEGATIVE
PH BLD: 7.36 PH (ref 7.35–7.45)
PH BLDV: 7.32 PH (ref 7.32–7.43)
PH UR STRIP: 6.5 PH (ref 5–7)
PLATELET # BLD AUTO: 156 10E9/L (ref 150–450)
PO2 BLD: 161 MM HG (ref 80–105)
PO2 BLDV: 52 MM HG (ref 25–47)
POTASSIUM SERPL-SCNC: 4.7 MMOL/L (ref 3.4–5.3)
PROT SERPL-MCNC: 7.7 G/DL (ref 6.8–8.8)
RBC # BLD AUTO: 3.82 10E12/L (ref 3.8–5.2)
RBC #/AREA URNS AUTO: ABNORMAL /HPF
SALICYLATES SERPL-MCNC: <2 MG/DL
SAO2 % BLDA FROM PO2: 99 % (ref 92–100)
SAO2 % BLDV FROM PO2: 83 %
SODIUM SERPL-SCNC: 138 MMOL/L (ref 133–144)
SOURCE: ABNORMAL
SP GR UR STRIP: 1.01 (ref 1–1.03)
TSH SERPL DL<=0.005 MIU/L-ACNC: 1.34 MU/L (ref 0.4–4)
UROBILINOGEN UR STRIP-ACNC: 0.2 EU/DL (ref 0.2–1)
WBC # BLD AUTO: 6.9 10E9/L (ref 4–11)
WBC #/AREA URNS AUTO: ABNORMAL /HPF
YEAST #/AREA URNS HPF: ABNORMAL /HPF

## 2017-12-19 PROCEDURE — 80329 ANALGESICS NON-OPIOID 1 OR 2: CPT | Performed by: EMERGENCY MEDICINE

## 2017-12-19 PROCEDURE — 81001 URINALYSIS AUTO W/SCOPE: CPT | Performed by: EMERGENCY MEDICINE

## 2017-12-19 PROCEDURE — 20000003 ZZH R&B ICU

## 2017-12-19 PROCEDURE — 87086 URINE CULTURE/COLONY COUNT: CPT | Performed by: EMERGENCY MEDICINE

## 2017-12-19 PROCEDURE — 87103 BLOOD FUNGUS CULTURE: CPT | Performed by: EMERGENCY MEDICINE

## 2017-12-19 PROCEDURE — 83605 ASSAY OF LACTIC ACID: CPT | Performed by: EMERGENCY MEDICINE

## 2017-12-19 PROCEDURE — 36415 COLL VENOUS BLD VENIPUNCTURE: CPT | Performed by: EMERGENCY MEDICINE

## 2017-12-19 PROCEDURE — 83036 HEMOGLOBIN GLYCOSYLATED A1C: CPT | Performed by: INTERNAL MEDICINE

## 2017-12-19 PROCEDURE — 85025 COMPLETE CBC W/AUTO DIFF WBC: CPT | Performed by: EMERGENCY MEDICINE

## 2017-12-19 PROCEDURE — 82803 BLOOD GASES ANY COMBINATION: CPT

## 2017-12-19 PROCEDURE — 40000281 ZZH STATISTIC TRANSPORT TIME EA 15 MIN

## 2017-12-19 PROCEDURE — 25000125 ZZHC RX 250: Performed by: INTERNAL MEDICINE

## 2017-12-19 PROCEDURE — 80053 COMPREHEN METABOLIC PANEL: CPT | Performed by: EMERGENCY MEDICINE

## 2017-12-19 PROCEDURE — 25000128 H RX IP 250 OP 636: Performed by: INTERNAL MEDICINE

## 2017-12-19 PROCEDURE — 96365 THER/PROPH/DIAG IV INF INIT: CPT

## 2017-12-19 PROCEDURE — 83605 ASSAY OF LACTIC ACID: CPT

## 2017-12-19 PROCEDURE — 93005 ELECTROCARDIOGRAM TRACING: CPT

## 2017-12-19 PROCEDURE — 80307 DRUG TEST PRSMV CHEM ANLYZR: CPT | Performed by: EMERGENCY MEDICINE

## 2017-12-19 PROCEDURE — 31500 INSERT EMERGENCY AIRWAY: CPT

## 2017-12-19 PROCEDURE — 25000125 ZZHC RX 250

## 2017-12-19 PROCEDURE — 25000132 ZZH RX MED GY IP 250 OP 250 PS 637: Mod: GY | Performed by: INTERNAL MEDICINE

## 2017-12-19 PROCEDURE — 40000986 XR CHEST PORT 1 VW

## 2017-12-19 PROCEDURE — 00000146 ZZHCL STATISTIC GLUCOSE BY METER IP

## 2017-12-19 PROCEDURE — 83036 HEMOGLOBIN GLYCOSYLATED A1C: CPT | Performed by: EMERGENCY MEDICINE

## 2017-12-19 PROCEDURE — 84443 ASSAY THYROID STIM HORMONE: CPT | Performed by: EMERGENCY MEDICINE

## 2017-12-19 PROCEDURE — 25000128 H RX IP 250 OP 636

## 2017-12-19 PROCEDURE — 99291 CRITICAL CARE FIRST HOUR: CPT | Performed by: INTERNAL MEDICINE

## 2017-12-19 PROCEDURE — 40000008 ZZH STATISTIC AIRWAY CARE

## 2017-12-19 PROCEDURE — 96375 TX/PRO/DX INJ NEW DRUG ADDON: CPT

## 2017-12-19 PROCEDURE — 87040 BLOOD CULTURE FOR BACTERIA: CPT | Performed by: EMERGENCY MEDICINE

## 2017-12-19 PROCEDURE — 87081 CULTURE SCREEN ONLY: CPT | Performed by: INTERNAL MEDICINE

## 2017-12-19 PROCEDURE — 25000128 H RX IP 250 OP 636: Performed by: EMERGENCY MEDICINE

## 2017-12-19 PROCEDURE — 99285 EMERGENCY DEPT VISIT HI MDM: CPT | Mod: 25

## 2017-12-19 PROCEDURE — 96366 THER/PROPH/DIAG IV INF ADDON: CPT

## 2017-12-19 PROCEDURE — A9270 NON-COVERED ITEM OR SERVICE: HCPCS | Mod: GY | Performed by: INTERNAL MEDICINE

## 2017-12-19 PROCEDURE — 36600 WITHDRAWAL OF ARTERIAL BLOOD: CPT

## 2017-12-19 PROCEDURE — 5A1945Z RESPIRATORY VENTILATION, 24-96 CONSECUTIVE HOURS: ICD-10-PCS | Performed by: INTERNAL MEDICINE

## 2017-12-19 PROCEDURE — 70450 CT HEAD/BRAIN W/O DYE: CPT

## 2017-12-19 PROCEDURE — 94002 VENT MGMT INPAT INIT DAY: CPT

## 2017-12-19 PROCEDURE — 96376 TX/PRO/DX INJ SAME DRUG ADON: CPT

## 2017-12-19 PROCEDURE — 40000275 ZZH STATISTIC RCP TIME EA 10 MIN

## 2017-12-19 RX ORDER — ETOMIDATE 2 MG/ML
INJECTION INTRAVENOUS
Status: COMPLETED
Start: 2017-12-19 | End: 2017-12-19

## 2017-12-19 RX ORDER — FENTANYL CITRATE 50 UG/ML
INJECTION, SOLUTION INTRAMUSCULAR; INTRAVENOUS
Status: COMPLETED
Start: 2017-12-19 | End: 2017-12-19

## 2017-12-19 RX ORDER — MEROPENEM AND SODIUM CHLORIDE 1 G/50ML
1 INJECTION, SOLUTION INTRAVENOUS EVERY 8 HOURS
Status: DISCONTINUED | OUTPATIENT
Start: 2017-12-20 | End: 2017-12-25 | Stop reason: HOSPADM

## 2017-12-19 RX ORDER — LEVOTHYROXINE SODIUM 50 UG/1
50 TABLET ORAL DAILY
Status: DISCONTINUED | OUTPATIENT
Start: 2017-12-19 | End: 2017-12-25 | Stop reason: HOSPADM

## 2017-12-19 RX ORDER — AMOXICILLIN 250 MG
2 CAPSULE ORAL 2 TIMES DAILY PRN
Status: DISCONTINUED | OUTPATIENT
Start: 2017-12-19 | End: 2017-12-25 | Stop reason: HOSPADM

## 2017-12-19 RX ORDER — PROPOFOL 10 MG/ML
INJECTION, EMULSION INTRAVENOUS
Status: COMPLETED
Start: 2017-12-19 | End: 2017-12-19

## 2017-12-19 RX ORDER — NICOTINE POLACRILEX 4 MG
15-30 LOZENGE BUCCAL
Status: DISCONTINUED | OUTPATIENT
Start: 2017-12-19 | End: 2017-12-25 | Stop reason: HOSPADM

## 2017-12-19 RX ORDER — ETOMIDATE 2 MG/ML
20 INJECTION INTRAVENOUS ONCE
Status: COMPLETED | OUTPATIENT
Start: 2017-12-19 | End: 2017-12-19

## 2017-12-19 RX ORDER — KETAMINE HCL IN 0.9 % NACL 20 MG/2 ML
SYRINGE (ML) INTRAVENOUS
Status: COMPLETED
Start: 2017-12-19 | End: 2017-12-19

## 2017-12-19 RX ORDER — PREDNISONE 5 MG/1
5 TABLET ORAL DAILY
Status: DISCONTINUED | OUTPATIENT
Start: 2017-12-19 | End: 2017-12-22

## 2017-12-19 RX ORDER — FENTANYL CITRATE 50 UG/ML
100 INJECTION, SOLUTION INTRAMUSCULAR; INTRAVENOUS ONCE
Status: COMPLETED | OUTPATIENT
Start: 2017-12-19 | End: 2017-12-19

## 2017-12-19 RX ORDER — GABAPENTIN 250 MG/5ML
900 SOLUTION ORAL 2 TIMES DAILY
Status: DISCONTINUED | OUTPATIENT
Start: 2017-12-19 | End: 2017-12-19 | Stop reason: DRUGHIGH

## 2017-12-19 RX ORDER — CINACALCET 30 MG/1
30 TABLET, FILM COATED ORAL DAILY
Status: DISCONTINUED | OUTPATIENT
Start: 2017-12-19 | End: 2017-12-25

## 2017-12-19 RX ORDER — FENTANYL CITRATE 50 UG/ML
50 INJECTION, SOLUTION INTRAMUSCULAR; INTRAVENOUS ONCE
Status: COMPLETED | OUTPATIENT
Start: 2017-12-19 | End: 2017-12-19

## 2017-12-19 RX ORDER — FAMOTIDINE 20 MG/1
20 TABLET, FILM COATED ORAL 2 TIMES DAILY
Status: DISCONTINUED | OUTPATIENT
Start: 2017-12-19 | End: 2017-12-25 | Stop reason: HOSPADM

## 2017-12-19 RX ORDER — BACLOFEN 10 MG/1
10 TABLET ORAL 3 TIMES DAILY PRN
Status: DISCONTINUED | OUTPATIENT
Start: 2017-12-19 | End: 2017-12-25 | Stop reason: HOSPADM

## 2017-12-19 RX ORDER — DIPHENOXYLATE HCL/ATROPINE 2.5-.025MG
2 TABLET ORAL 4 TIMES DAILY PRN
Qty: 30 TABLET | Refills: 0 | Status: SHIPPED | OUTPATIENT
Start: 2017-12-19 | End: 2017-12-19

## 2017-12-19 RX ORDER — PRAVASTATIN SODIUM 10 MG
10 TABLET ORAL DAILY
Status: DISCONTINUED | OUTPATIENT
Start: 2017-12-19 | End: 2017-12-25 | Stop reason: HOSPADM

## 2017-12-19 RX ORDER — CLONIDINE HYDROCHLORIDE 0.1 MG/1
0.1 TABLET ORAL 2 TIMES DAILY
Status: DISCONTINUED | OUTPATIENT
Start: 2017-12-19 | End: 2017-12-25 | Stop reason: HOSPADM

## 2017-12-19 RX ORDER — LABETALOL HYDROCHLORIDE 5 MG/ML
20 INJECTION, SOLUTION INTRAVENOUS EVERY 4 HOURS PRN
Status: DISCONTINUED | OUTPATIENT
Start: 2017-12-19 | End: 2017-12-22

## 2017-12-19 RX ORDER — MEROPENEM AND SODIUM CHLORIDE 1 G/50ML
1 INJECTION, SOLUTION INTRAVENOUS ONCE
Status: COMPLETED | OUTPATIENT
Start: 2017-12-19 | End: 2017-12-19

## 2017-12-19 RX ORDER — PROPOFOL 10 MG/ML
5-75 INJECTION, EMULSION INTRAVENOUS CONTINUOUS
Status: DISCONTINUED | OUTPATIENT
Start: 2017-12-19 | End: 2017-12-21

## 2017-12-19 RX ORDER — NALOXONE HYDROCHLORIDE 0.4 MG/ML
.1-.4 INJECTION, SOLUTION INTRAMUSCULAR; INTRAVENOUS; SUBCUTANEOUS
Status: DISCONTINUED | OUTPATIENT
Start: 2017-12-19 | End: 2017-12-25 | Stop reason: HOSPADM

## 2017-12-19 RX ORDER — HYDRALAZINE HYDROCHLORIDE 20 MG/ML
10 INJECTION INTRAMUSCULAR; INTRAVENOUS ONCE
Status: COMPLETED | OUTPATIENT
Start: 2017-12-19 | End: 2017-12-19

## 2017-12-19 RX ORDER — VANCOMYCIN HYDROCHLORIDE 1 G/200ML
1000 INJECTION, SOLUTION INTRAVENOUS EVERY 24 HOURS
Status: DISCONTINUED | OUTPATIENT
Start: 2017-12-19 | End: 2017-12-19

## 2017-12-19 RX ORDER — ASPIRIN 81 MG/1
81 TABLET, CHEWABLE ORAL DAILY
Status: DISCONTINUED | OUTPATIENT
Start: 2017-12-19 | End: 2017-12-25 | Stop reason: HOSPADM

## 2017-12-19 RX ORDER — AMOXICILLIN 250 MG
1 CAPSULE ORAL 2 TIMES DAILY PRN
Status: DISCONTINUED | OUTPATIENT
Start: 2017-12-19 | End: 2017-12-25 | Stop reason: HOSPADM

## 2017-12-19 RX ORDER — OXCARBAZEPINE 300 MG/1
300 TABLET, FILM COATED ORAL 2 TIMES DAILY
Status: DISCONTINUED | OUTPATIENT
Start: 2017-12-19 | End: 2017-12-25 | Stop reason: HOSPADM

## 2017-12-19 RX ORDER — DEXTROSE MONOHYDRATE 25 G/50ML
25-50 INJECTION, SOLUTION INTRAVENOUS
Status: DISCONTINUED | OUTPATIENT
Start: 2017-12-19 | End: 2017-12-25 | Stop reason: HOSPADM

## 2017-12-19 RX ORDER — GABAPENTIN 250 MG/5ML
300 SOLUTION ORAL 2 TIMES DAILY
Status: DISCONTINUED | OUTPATIENT
Start: 2017-12-19 | End: 2017-12-21

## 2017-12-19 RX ORDER — SODIUM CHLORIDE, SODIUM LACTATE, POTASSIUM CHLORIDE, CALCIUM CHLORIDE 600; 310; 30; 20 MG/100ML; MG/100ML; MG/100ML; MG/100ML
INJECTION, SOLUTION INTRAVENOUS CONTINUOUS
Status: DISCONTINUED | OUTPATIENT
Start: 2017-12-19 | End: 2017-12-25 | Stop reason: HOSPADM

## 2017-12-19 RX ORDER — CHLORHEXIDINE GLUCONATE ORAL RINSE 1.2 MG/ML
15 SOLUTION DENTAL EVERY 12 HOURS
Status: DISCONTINUED | OUTPATIENT
Start: 2017-12-19 | End: 2017-12-22

## 2017-12-19 RX ADMIN — FENTANYL CITRATE 50 MCG: 50 INJECTION INTRAMUSCULAR; INTRAVENOUS at 18:40

## 2017-12-19 RX ADMIN — CLONIDINE HYDROCHLORIDE 0.1 MG: 0.1 TABLET ORAL at 21:21

## 2017-12-19 RX ADMIN — VANCOMYCIN HYDROCHLORIDE 2500 MG: 5 INJECTION, POWDER, LYOPHILIZED, FOR SOLUTION INTRAVENOUS at 18:15

## 2017-12-19 RX ADMIN — CINACALCET HYDROCHLORIDE 30 MG: 30 TABLET, COATED ORAL at 21:56

## 2017-12-19 RX ADMIN — LEVOTHYROXINE SODIUM 50 MCG: 50 TABLET ORAL at 21:21

## 2017-12-19 RX ADMIN — GABAPENTIN 300 MG: 250 SUSPENSION ORAL at 22:34

## 2017-12-19 RX ADMIN — PREDNISONE 5 MG: 5 TABLET ORAL at 21:56

## 2017-12-19 RX ADMIN — FAMOTIDINE 20 MG: 20 TABLET ORAL at 22:32

## 2017-12-19 RX ADMIN — SODIUM CHLORIDE, POTASSIUM CHLORIDE, SODIUM LACTATE AND CALCIUM CHLORIDE: 600; 310; 30; 20 INJECTION, SOLUTION INTRAVENOUS at 20:34

## 2017-12-19 RX ADMIN — OXCARBAZEPINE 300 MG: 300 TABLET, FILM COATED ORAL at 21:56

## 2017-12-19 RX ADMIN — PROPOFOL 20 MCG/KG/MIN: 10 INJECTION, EMULSION INTRAVENOUS at 21:35

## 2017-12-19 RX ADMIN — FENTANYL CITRATE 50 MCG: 50 INJECTION, SOLUTION INTRAMUSCULAR; INTRAVENOUS at 16:36

## 2017-12-19 RX ADMIN — MEROPENEM AND SODIUM CHLORIDE 1 G: 1 INJECTION, SOLUTION INTRAVENOUS at 17:42

## 2017-12-19 RX ADMIN — MICONAZOLE NITRATE: 2 POWDER TOPICAL at 22:01

## 2017-12-19 RX ADMIN — ENOXAPARIN SODIUM 40 MG: 40 INJECTION SUBCUTANEOUS at 21:18

## 2017-12-19 RX ADMIN — FENTANYL CITRATE 100 MCG: 50 INJECTION INTRAMUSCULAR; INTRAVENOUS at 16:26

## 2017-12-19 RX ADMIN — SODIUM CHLORIDE 1000 ML: 9 INJECTION, SOLUTION INTRAVENOUS at 16:22

## 2017-12-19 RX ADMIN — ASPIRIN 81 MG 81 MG: 81 TABLET ORAL at 21:21

## 2017-12-19 RX ADMIN — FENTANYL CITRATE 100 MCG: 50 INJECTION, SOLUTION INTRAMUSCULAR; INTRAVENOUS at 16:26

## 2017-12-19 RX ADMIN — PROPOFOL 20 MCG/KG/MIN: 10 INJECTION, EMULSION INTRAVENOUS at 16:27

## 2017-12-19 RX ADMIN — CHLORHEXIDINE GLUCONATE 15 ML: 1.2 RINSE ORAL at 21:17

## 2017-12-19 RX ADMIN — FENTANYL CITRATE 25 MCG/HR: 50 INJECTION, SOLUTION INTRAMUSCULAR; INTRAVENOUS at 19:13

## 2017-12-19 RX ADMIN — HYDRALAZINE HYDROCHLORIDE 10 MG: 20 INJECTION INTRAMUSCULAR; INTRAVENOUS at 17:42

## 2017-12-19 RX ADMIN — FENTANYL CITRATE 50 MCG: 50 INJECTION INTRAMUSCULAR; INTRAVENOUS at 17:43

## 2017-12-19 RX ADMIN — SODIUM CHLORIDE, POTASSIUM CHLORIDE, SODIUM LACTATE AND CALCIUM CHLORIDE 1000 ML: 600; 310; 30; 20 INJECTION, SOLUTION INTRAVENOUS at 21:33

## 2017-12-19 RX ADMIN — PRAVASTATIN SODIUM 10 MG: 10 TABLET ORAL at 21:56

## 2017-12-19 RX ADMIN — ROCURONIUM BROMIDE 100 MG: 10 INJECTION INTRAVENOUS at 16:21

## 2017-12-19 RX ADMIN — Medication 100 MG: at 16:21

## 2017-12-19 RX ADMIN — ETOMIDATE 20 MG: 2 INJECTION INTRAVENOUS at 16:19

## 2017-12-19 RX ADMIN — FENTANYL CITRATE 50 MCG: 50 INJECTION INTRAMUSCULAR; INTRAVENOUS at 16:36

## 2017-12-19 RX ADMIN — ETOMIDATE 20 MG: 20 INJECTION, SOLUTION INTRAVENOUS at 16:19

## 2017-12-19 NOTE — IP AVS SNAPSHOT
MRN:[id number]                      After Visit Summary   12/19/2017    Gem Jade    MRN: [id number]           Thank you!     Thank you for choosing [facility] for your care. Our goal is always to provide you with excellent care. Hearing back from our patients is one way we can continue to improve our services. Please take a few minutes to complete the written survey that you may receive in the mail after you visit with us. Thank you!        Patient Information     Date Of Birth          1945        Designated Caregiver       Most Recent Value    Caregiver    Will someone help with your care after discharge? yes    Name of designated caregiver jamie    Phone number of caregiver se chart    Caregiver address see chart      About your hospital stay     You were admitted on:  December 19, 2017 You last received care in the:  [place] [address] [facility]    You were discharged on:  December 25, 2017        Reason for your hospital stay       You were admitted for altered mental status due to another UTI.                        You have Neurogenic bladder with recurrent UTIs.                You were treated with IV meropenem      you were seen by nephrology and infectious disease specialist in the hospital   calcium level is on the low side. You are on Sensipar for hyperparathyroidism. She has hyperparathyroidism, which we suspect is due to severe vitamin D deficiency. Discontinue Sensipar and check vitamin D level Renal panel this week  Fax result to your nephrologist.  At the time of discharge you were feeling better and mental status improved and you were discharged on your request in stable condition.                     Who to Call     For medical emergencies, please call 911.  For non-urgent questions about your medical care, please call your primary care provider or clinic, [phone]          Attending Provider     Provider Specialty    Zan Jauregui MD Emergency Medicine     Boris Christianson MD Internal Medicine    Jeannette Hurtado MD Pulmonary       Primary Care Provider Office Phone # Fax #    [facility] [phone]      After Care Instructions     Activity       Your activity upon discharge: activity as tolerated            Diet       Follow this diet upon discharge: Orders Placed This Encounter      Combination Diet Regular Diet Adult, Safe Tray - with utensils                  Follow-up Appointments     Follow-up and recommended labs and tests        Follow up with primary care provider, Marivel Barcenas, ALISON, within 3-4 days for hospital follow- up.  The following labs/tests are recommended: vit D and renal panel have your PCP fax results to nephrologist                  Additional Services     Home Care OT Referral for Hospital Discharge       OT to al and treat    Your provider has ordered home care - occupational therapy. If you have not been contacted within 2 days of your discharge please call the department phone number listed on the top of this document.            Home Care PT Referral for Hospital Discharge       PT to eval and treat    Your provider has ordered home care - physical therapy. If you have not been contacted within 2 days of your discharge please call the department phone number listed on the top of this document.            Home care nursing referral       RN skilled nursing visit. RN to assess vital signs and weight and medication compliance.    Your provider has ordered home care nursing services. If you have not been contacted within 2 days of your discharge please call the inpatient department phone number at [phone] .            MED THERAPY MANAGE REFERRAL       Patient has diagnosis of Diabetes, Heart Failure, COPD, or AMI and is on more than 5 medications                  Pending Results     Date and Time Order Name Status Description    12/19/2017 1631 Fungus culture blood Preliminary  "            Statement of Approval     Ordered          12/25/17 1329  I have reviewed and agree with all the recommendations and orders detailed in this document.  EFFECTIVE NOW     Approved and electronically signed by:  Leonor Osborn MD             Admission Information     Date & Time Provider Department Dept. Phone    12/19/2017 Jeannette Hurtado MD [place] [address] [facility] [phone]      Your Vitals Were     Blood Pressure Pulse Temperature Respirations Height Weight    134/51 (BP Location: Left arm) 75 97.3  F (36.3  C) (Oral) 16 1.676 m (5' 6\") 108 kg (238 lb)    Pulse Oximetry BMI (Body Mass Index)                97% 38.41 kg/m2          MyChart Information     [employer] gives you secure access to your electronic health record. If you see a primary care provider, you can also send messages to your care team and make appointments. If you have questions, please call your primary care clinic.  If you do not have a primary care provider, please call [phone] and they will assist you.        Care EveryWhere ID     This is your Care EveryWhere ID. This could be used by other organizations to access your [facility] medical records  [id number]        Equal Access to Services     CARLOS ENRIQUE GREEN AH: Hadii jairo Goodman, wabettyda malaika, qaybta kaalmajorje jimenez, jessica roman. So [facility] [phone].    ATENCIÓN: Si habla español, tiene a peguero disposición servicios gratuitos de asistencia lingüística. Carmine al [phone].    We comply with applicable federal civil rights laws and [place] laws. We do not discriminate on the basis of race, color, national origin, age, disability, sex, sexual orientation, or gender identity.               Review of your medicines      CONTINUE these medicines which may have CHANGED, or have new prescriptions. If we are uncertain of the size of tablets/capsules you have at home, strength may be listed as something that might have " changed.        Dose / Directions    diphenoxylate-atropine 2.5-0.025 MG per tablet   Commonly known as:  LOMOTIL   This may have changed:  Another medication with the same name was removed. Continue taking this medication, and follow the directions you see here.   Used for:  Diarrhea of presumed infectious origin        Dose:  1 tablet   Take 1 tablet by mouth 4 times daily as needed for diarrhea   Quantity:  30 tablet   Refills:  0       insulin aspart 100 UNIT/ML injection   Commonly known as:  NovoLOG FLEXPEN   This may have changed:  additional instructions   Used for:  Type 2 diabetes mellitus with diabetic polyneuropathy, with long-term current use of insulin (H)        Inject 10 units under the skin 2 times daily before meals as directed. Add sliding scale as needed. Avg use 25 units per day   Quantity:  15 mL   Refills:  11       insulin glargine 100 UNIT/ML injection   Commonly known as:  LANTUS SOLOSTAR   This may have changed:    - how much to take  - how to take this  - when to take this  - additional instructions   Used for:  Type 2 diabetes mellitus with diabetic polyneuropathy, with long-term current use of insulin (H)        Inject 24 units under the skin daily.   Quantity:  15 mL   Refills:  11         CONTINUE these medicines which have NOT CHANGED        Dose / Directions    ACCU-CHEK COMPACT PLUS test strip   Generic drug:  blood glucose monitoring        Use to test blood sugar 3 times daily or as directed.  Ok to substitute alternative if insurance prefers.   Quantity:  100 strip   Refills:  prn       ACETAMINOPHEN PO        Dose:  325-650 mg   Take 325-650 mg by mouth every 4 hours as needed.   Refills:  0       aspirin 81 MG chewable tablet   Used for:  Pulmonary embolism and infarction (H), Diabetes mellitus with background retinopathy (H)        Dose:  81 mg   Take 1 tablet (81 mg) by mouth daily Starting 1 week after your kidney biopsy   Quantity:  90 tablet   Refills:  3       BACLOFEN  PO        Dose:  10 mg   Take 10 mg by mouth 3 times daily as needed for muscle spasms   Refills:  0       cloNIDine 0.1 MG tablet   Commonly known as:  CATAPRES   Used for:  HTN, goal below 140/90        TAKE ONE TABLET BY MOUTH TWICE A DAY   Quantity:  180 tablet   Refills:  3       Cranberry 400 MG Tabs        Dose:  400 mg   Take 400 mg by mouth 2 times daily   Refills:  0       cyanocobalamin 1000 MCG/ML injection   Commonly known as:  VITAMIN B12   Used for:  Iron deficiency anemia, unspecified iron deficiency anemia type        Dose:  1000 mcg   Inject 1 mL (1,000 mcg) into the muscle every 30 days   Quantity:  3 mL   Refills:  3       famotidine 20 MG tablet   Commonly known as:  PEPCID        Dose:  20 mg   Take 1 tablet (20 mg) by mouth 2 times daily   Quantity:  60 tablet   Refills:  1       gabapentin 300 MG capsule   Commonly known as:  NEURONTIN   Used for:  Diabetic polyneuropathy associated with type 2 diabetes mellitus (H)        Dose:  900 mg   Take 3 capsules (900 mg) by mouth 2 times daily For pain   Quantity:  180 capsule   Refills:  3       insulin pen needle 30G X 8 MM   Used for:  Type 2 diabetes mellitus with diabetic nephropathy (H)        Use as directed with Lantus   Quantity:  100 each   Refills:  3       labetalol 300 MG tablet   Commonly known as:  NORMODYNE   Used for:  Benign essential hypertension, Kidney replaced by transplant        Dose:  150 mg   Take 150 mg by mouth 2 times daily Take 1/2 tablet (150mg) =300mg. Hold for systolic BP less than 120.   Quantity:  120 tablet   Refills:  3       lactobacillus rhamnosus (GG) capsule   Used for:  Diarrhea        Dose:  1 capsule   Take 1 capsule by mouth 2 times daily   Quantity:  60 capsule   Refills:  11       levothyroxine 50 MCG tablet   Commonly known as:  SYNTHROID/LEVOTHROID   Used for:  Other specified hypothyroidism        TAKE ONE TABLET BY MOUTH EVERY DAY   Quantity:  90 tablet   Refills:  3       lidocaine 2 % topical gel    Commonly known as:  XYLOCAINE   Used for:  Johnson catheter in place        Apply topically as needed for moderate pain 10 ML every 3 weeks   Quantity:  30 mL   Refills:  11       loratadine 10 MG tablet   Commonly known as:  CLARITIN   Used for:  Acute nasopharyngitis        Dose:  10 mg   Take 1 tablet (10 mg) by mouth daily   Quantity:  90 tablet   Refills:  3       meclizine 12.5 MG tablet   Commonly known as:  ANTIVERT   Used for:  Disorientation        Dose:  12.5 mg   Take 1 tablet (12.5 mg) by mouth 3 times daily as needed for dizziness   Quantity:  30 tablet   Refills:  0       ondansetron 4 MG ODT tab   Commonly known as:  ZOFRAN ODT   Used for:  Nausea        Dose:  4-8 mg   Take 1-2 tablets (4-8 mg) by mouth every 8 hours as needed for nausea   Quantity:  20 tablet   Refills:  5       order for DME   Used for:  Johnson catheter status        Equipment being ordered: Johnson Catheters - 18 french   Quantity:  3 catheter   Refills:  PRN       * order for DME   Used for:  Fecal incontinence        Equipment being ordered: Blue Chux [facility] send to [facility]   Quantity:  100 pad   Refills:  3       * order for DME   Used for:  Obstructive sleep apnea syndrome        Equipment being ordered: Isabella Oliver FX CPAP interface (nasal pillows), size XS.   Quantity:  1 each   Refills:  1       * order for DME   Used for:  Recurrent UTI        Equipment being ordered: Silver Impregnated catheters HX of frequent UTI   Quantity:  12 each   Refills:  1       * order for DME   Used for:  Morbid obesity, unspecified obesity type (H), Vertigo, Generalized muscle weakness, Physical deconditioning        High back guerrero wheel chair MICHELLE 99 DX: morbid obesity, generalized weakness, physical deconditioning, chronic vertigo   Quantity:  1 Device   Refills:  0       pravastatin 10 MG tablet   Commonly known as:  PRAVACHOL   Used for:  Hyperlipidemia LDL goal <100        TAKE ONE TABLET BY MOUTH EVERY DAY   Quantity:  90 tablet    Refills:  3       predniSONE 5 MG tablet   Commonly known as:  DELTASONE   Used for:  Kidney replaced by transplant        Dose:  5 mg   Take 1 tablet (5 mg) by mouth daily   Quantity:  90 tablet   Refills:  3       simethicone 125 MG Chew chewable tablet   Commonly known as:  MYLICON        Dose:  125 mg   Take 1 tablet (125 mg) by mouth as needed for intestinal gas   Quantity:  120 tablet   Refills:  0       tacrolimus 1 MG capsule   Commonly known as:  GENERIC EQUIVALENT   Used for:  Kidney transplant recipient, Long-term use of immunosuppressant medication        Dose:  2 mg   Take 2 capsules (2 mg) by mouth 2 times daily   Refills:  0       TraMADol HCl 50 MG Tbdp   Indication:  Moderate to Moderately Severe Pain   Used for:  Chronic shoulder pain, unspecified laterality        Dose:  50 mg   Take 50 mg by mouth 3 times daily as needed   Quantity:  90 tablet   Refills:  3       TRILEPTAL 300 MG tablet   Used for:  Diabetic polyneuropathy associated with diabetes mellitus due to underlying condition (H)   Generic drug:  OXcarbazepine        Dose:  300 mg   Take 1 tablet (300 mg) by mouth 2 times daily For pain   Quantity:  180 tablet   Refills:  3       vitamin D 2000 UNITS tablet   Used for:  Vitamin deficiency        Dose:  4000 Units   Take 4,000 Units by mouth 2 times daily   Quantity:  100 tablet   Refills:  3       * Notice:  This list has 4 medication(s) that are the same as other medications prescribed for you. Read the directions carefully, and ask your doctor or other care provider to review them with you.      STOP taking     cinacalcet 30 MG tablet   Commonly known as:  SENSIPAR                Where to get your medicines      Some of these will need a paper prescription and others can be bought over the counter. Ask your nurse if you have questions.     You don't need a prescription for these medications     tacrolimus 1 MG capsule                Protect others around you: Learn how to safely use,  store and throw away your medicines at www.disposemymeds.org.             Medication List: This is a list of all your medications and when to take them. Check marks below indicate your daily home schedule. Keep this list as a reference.      Medications           Morning Afternoon Evening Bedtime As Needed    ACCU-CHEK COMPACT PLUS test strip   Use to test blood sugar 3 times daily or as directed.  Ok to substitute alternative if insurance prefers.   Generic drug:  blood glucose monitoring                                ACETAMINOPHEN PO   Take 325-650 mg by mouth every 4 hours as needed.                                aspirin 81 MG chewable tablet   Take 1 tablet (81 mg) by mouth daily Starting 1 week after your kidney biopsy   Last time this was given:  81 mg on 12/25/2017  9:38 AM                                BACLOFEN PO   Take 10 mg by mouth 3 times daily as needed for muscle spasms   Last time this was given:  10 mg on 12/21/2017  4:21 AM                                cloNIDine 0.1 MG tablet   Commonly known as:  CATAPRES   TAKE ONE TABLET BY MOUTH TWICE A DAY   Last time this was given:  0.1 mg on 12/25/2017  9:38 AM                                Cranberry 400 MG Tabs   Take 400 mg by mouth 2 times daily                                cyanocobalamin 1000 MCG/ML injection   Commonly known as:  VITAMIN B12   Inject 1 mL (1,000 mcg) into the muscle every 30 days                                diphenoxylate-atropine 2.5-0.025 MG per tablet   Commonly known as:  LOMOTIL   Take 1 tablet by mouth 4 times daily as needed for diarrhea                                famotidine 20 MG tablet   Commonly known as:  PEPCID   Take 1 tablet (20 mg) by mouth 2 times daily   Last time this was given:  20 mg on 12/25/2017  9:40 AM                                gabapentin 300 MG capsule   Commonly known as:  NEURONTIN   Take 3 capsules (900 mg) by mouth 2 times daily For pain   Last time this was given:  900 mg on 12/25/2017   9:40 AM                                insulin aspart 100 UNIT/ML injection   Commonly known as:  NovoLOG FLEXPEN   Inject 10 units under the skin 2 times daily before meals as directed. Add sliding scale as needed. Avg use 25 units per day   Last time this was given:  2 Units on 12/25/2017  9:50 AM                                insulin glargine 100 UNIT/ML injection   Commonly known as:  LANTUS SOLOSTAR   Inject 24 units under the skin daily.   Last time this was given:  12 Units on 12/24/2017 10:49 PM                                insulin pen needle 30G X 8 MM   Use as directed with Lantus                                labetalol 300 MG tablet   Commonly known as:  NORMODYNE   Take 150 mg by mouth 2 times daily Take 1/2 tablet (150mg) =300mg. Hold for systolic BP less than 120.   Last time this was given:  150 mg on 12/25/2017  9:39 AM                                lactobacillus rhamnosus (GG) capsule   Take 1 capsule by mouth 2 times daily                                levothyroxine 50 MCG tablet   Commonly known as:  SYNTHROID/LEVOTHROID   TAKE ONE TABLET BY MOUTH EVERY DAY   Last time this was given:  50 mcg on 12/25/2017  9:40 AM                                lidocaine 2 % topical gel   Commonly known as:  XYLOCAINE   Apply topically as needed for moderate pain 10 ML every 3 weeks                                loratadine 10 MG tablet   Commonly known as:  CLARITIN   Take 1 tablet (10 mg) by mouth daily                                meclizine 12.5 MG tablet   Commonly known as:  ANTIVERT   Take 1 tablet (12.5 mg) by mouth 3 times daily as needed for dizziness                                ondansetron 4 MG ODT tab   Commonly known as:  ZOFRAN ODT   Take 1-2 tablets (4-8 mg) by mouth every 8 hours as needed for nausea                                order for DME   Equipment being ordered: Johnson Catheters - 18 french                                * order for DME   Equipment being ordered: Blue Chux Please  send to [facility]                                * order for DME   Equipment being ordered: Ahuja FX CPAP interface (nasal pillows), size XS.                                * order for DME   Equipment being ordered: Silver Impregnated catheters HX of frequent UTI                                * order for DME   High back guerrero wheel chair MICHELLE 99 DX: morbid obesity, generalized weakness, physical deconditioning, chronic vertigo                                pravastatin 10 MG tablet   Commonly known as:  PRAVACHOL   TAKE ONE TABLET BY MOUTH EVERY DAY   Last time this was given:  10 mg on 12/25/2017  9:41 AM                                predniSONE 5 MG tablet   Commonly known as:  DELTASONE   Take 1 tablet (5 mg) by mouth daily   Last time this was given:  5 mg on 12/25/2017  9:40 AM                                simethicone 125 MG Chew chewable tablet   Commonly known as:  MYLICON   Take 1 tablet (125 mg) by mouth as needed for intestinal gas                                tacrolimus 1 MG capsule   Commonly known as:  GENERIC EQUIVALENT   Take 2 capsules (2 mg) by mouth 2 times daily                                TraMADol HCl 50 MG Tbdp   Take 50 mg by mouth 3 times daily as needed                                TRILEPTAL 300 MG tablet   Take 1 tablet (300 mg) by mouth 2 times daily For pain   Last time this was given:  300 mg on 12/25/2017  9:41 AM   Generic drug:  OXcarbazepine                                vitamin D 2000 UNITS tablet   Take 4,000 Units by mouth 2 times daily                                * Notice:  This list has 4 medication(s) that are the same as other medications prescribed for you. Read the directions carefully, and ask your doctor or other care provider to review them with you.

## 2017-12-19 NOTE — ED PROVIDER NOTES
History     Chief Complaint:  Altered mental status      HPI  EMS provided history due to the patient's altered mental status.   Gem Jade is a 72 year old female with a history of urosepsis, neurogenic bladder with chronic rico, recurrent UTI's, pyelonephritis, kidney transplant (1999), and DM with complications who presents to the ED via EMS for evaluation of altered mental status. According to EMS, her home health care mentioned that the patient, who is normally bedridden, was last normal on 12/17. Yesterday, she was able to take her medication, but today, the patient responded only to painful stimuli. EMS was called to bring her to the ED. EMS states that she was vitally stable and only received oxygen en route. Her blood sugar was measured at 115.    Of note, she was admitted on 11/06 for urosepsis and was discharged on 11/09. Dr. Brittani Otriz under the supervision of Dr. Webber, discharged the patient with diagnosed acute toxic/metabolic encephalopathy and sepsis 2/2 rico associated UTI. She was prescribed a course of Cipro. In Dr. Ortiz's note, she noted consideration for the use of long term suppressive antibiotics has been considered in the past, but was not pursued due to concerns for drug resistant c.diff. Colitis. She also notes that the patient has been unable to follow up with Urology due to mobility and cost of attending a clinic appointment.    Of note, she has a history of two previous intubations.        Allergies:  Bactrim D  Atorvastatin calcium  Codeine  Darvocet  Hydromorphone  Levofloxacin  Morphine  Niaspan  Percocet  Vicodin     Medications:    levothyroxine (SYNTHROID/LEVOTHROID) 50 MCG tablet  diphenoxylate-atropine (LOMOTIL) 2.5-0.025 MG per tablet  tacrolimus (GENERIC EQUIVALENT) 1 MG capsule  diphenoxylate-atropine (LOMOTIL) 2.5-0.025 MG per tablet  meclizine (ANTIVERT) 12.5 MG tablet  gabapentin (NEURONTIN) 300 MG capsule  cloNIDine (CATAPRES) 0.1 MG tablet  Cholecalciferol  (VITAMIN D) 2000 UNITS tablet  predniSONE (DELTASONE) 5 MG tablet  insulin glargine (LANTUS SOLOSTAR) 100 UNIT/ML injection  insulin aspart (NOVOLOG FLEXPEN) 100 UNIT/ML injection  lidocaine (XYLOCAINE) 2 % topical gel  labetalol (NORMODYNE) 300 MG tablet  loratadine (CLARITIN) 10 MG tablet  pravastatin (PRAVACHOL) 10 MG tablet  blood glucose monitoring (ACCU-CHEK COMPACT PLUS) test strip  OXcarbazepine (TRILEPTAL) 300 MG tablet  aspirin 81 MG chewable tablet  cinacalcet (SENSIPAR) 30 MG tablet  cyanocobalamin (VITAMIN B12) 1000 MCG/ML injection  TraMADol HCl 50 MG TBDP  insulin pen needle 30G X 8 MM  ondansetron (ZOFRAN ODT) 4 MG disintegrating tablet  simethicone (MYLICON) 125 MG CHEW  Lactobacillus Rhamnosus, GG, (CULTURELL) capsule  Cranberry 400 MG TABS  famotidine (PEPCID) 20 MG tablet  ACETAMINOPHEN PO    Past Medical History:    Background diabetic retinopathy(362.01)   Diabetic gastroparesis (H)   Diarrhea   Dizziness and giddiness   Hyponatremia   Kidney replaced by transplant   Mixed hyperlipidemia   Obesity   Osteopenia   Other and unspecified hyperlipidemia   pulmonary embolism and infarction   Polyneuropathy in diabetes(357.2)   Primary localized osteoarthrosis, lower leg   Pyelonephritis, unspecified   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  Former smoker: 17 years, quit 1987  Negative for alcohol use.  Marital Status:   [2]     Review of Systems   Unable to perform ROS:  Mental status change     Physical Exam   First Vitals:  Patient Vitals for the past 24 hrs:   BP Temp Temp src Pulse Heart Rate Resp SpO2 Weight   12/19/17 1715 (!) 197/97 - - - 58 15 100 % -   12/19/17 1700 (!) 206/99 - - - 60 13 100 % -   12/19/17 1635 (!) 209/104 - - - - 14 100 % -   12/19/17 1630 (!) 222/105 - - - - 19 99 % -   12/19/17 1625 (!) 238/107 - - 72 - 14 100 % -   12/19/17 1620 (!) 173/105 - - - - 10 100 % -   12/19/17 1615 (!) 184/95 - - 68 62 (!) 7 (!) 62 % -   12/19/17 1611 - - - - - - - 99.8 kg (220 lb 0.3 oz)   12/19/17 1610 (!) 184/91 - - - - 18 94 % -   12/19/17 1607 - 98.5  F (36.9  C) Rectal - - - - -   12/19/17 1600 179/85 - - 64 63 27 100 % -       Physical Exam  General: Obtunded. Appears chronically ill. No acute distress.  Head:  Scalp, face, and head appear normal. Atraumatic.  Eyes:  Pupils are equal, round, and reactive to light. 3mm bilaterally    Conjunctivae non-injected and sclerae white  ENT:    The external nose is normal    Pinnae are normal    The oropharynx is normal, mucous membranes dry    Uvula is in the midline  Neck:  There is no rigidity noted    Trachea is in the midline  CV:  Regular rate and rhythm     Normal S1/S2, no S3/S4    No murmur or rub  Resp:  Lungs are clear and equal bilaterally    There is no tachypnea    No increased work of breathing    No rales, wheezing, or rhonchi  GI:  Abdomen is soft, obese no rigidity    No distension, or mass    No response to palpation of abdomen  :  Normal external female genitalia. Thick white discharge and erythema present. Johnson in place.  MS:  Decreased muscle tone. Occasional low amplitude myoclonic twitching in the extremities.    No lower extremity edema  Skin:  No acute skin lesions  Neuro: GCS5 (E1V1M3)    Patient flexes to painful stimuli. Does not localize or follow commands    No facial asymmetry  Psych:  Unable to assess        Emergency Department Course   ECG:  Indication: Altered mental status  Time:  "1602  Vent. Rate 63 bpm. NE interval 184. QRS duration 86. QT/QTc 442/452. P-R-T axis 0 -1 67. Normal sinus rhythm. Normal ECG.  Read time: 1837.    Imaging:  Chest XR, 1 View  NG tube into the stomach.  As per radiology.    Head CT w/o Contrast  1. No acute abnormality.  2. Atrophy of the brain.  White matter changes consistent with sequelae of small vessel ischemic disease.  3. No change.  As per radiology.    XR Chest Port 1 View  Endotracheal tube in good position. Nasogastric tube passes into the abdomen. Lungs are clear. Mild cardiomegaly. Normal pulmonary vascularity. No change.  As per radiology.     Laboratory:  ISTAT gases lactate art POCT: pCO2 arterial 48 (H), pO2 arterial 161 (H), lactic acid 0.6 (L)  ISTAT gases venous POCT: pCO2 venous 55 (H), pO2 52 (H), bicarbonate 29 (H)  Drug abuse screen 77 urine: negative  UA with micro:  (A), blood large (A), protein albumin 100 (A), leukocyte esterase large (A), o/w negative  Urine Microscopic: WBC , RBC 25-50, Squamous Epithelial many, Bacteria many, yeast few  Urine Microscope: WBC/HPF  (A), RBC/HPF 25-50 (A), squamous epithelial?LPF many (A), bacteria many (A), yeast few (A)  TSH with free T4 Reflex: 1.34  Salicylate Level: <2  Acetaminophen Level: <2  Lactic Acid: 0.8  CBC: WBC: 6.9, HGB: 11.6 (L), PLT: 156  CMP: Glucose 158 (H), BUN 38 (H), Creatinine 1.21 (H), GFR 44 (L), Albumin 3.0 (L), o/w WNL   1612 Glucose: 141 (H)  2000 Glucose: 130 (H)    Fungus Culture: In porcess  Urine culture aerobic bacterial: pending  Blood culture: In process x2    Procedure:     Intubation      INDICATION: Airway protection.    PERFORMED BY: Dr. Jauregui    CONSENT: The procedure was performed in an emergent situation.    TIMEOUT: Immediately prior to procedure a \"time out\" was called to verify the correct patient, procedure, equipment, support staff and site/side marked as required.    INTUBATION METHOD: Fiberoptic direct    PATIENT STATUS: " RSI    PREOXYGENATION: Mask, high flow nasal cannula    PRETREATMENT MEDICATIONS: None    SEDATIVES: Etomidate    PARALYTIC: rocuronium    LARYNGOSCOPE SIZE: Mac 3 blade on the C-MAC    TUBE SIZE: 7.5 cuffed with cuff inflated after placement  Number of attempts: 1  Cords visualized: yes    POST-PROCEDURE ASSESSMENT: Breath sounds equal bilaterally with chest rise and absent over the epigastrium, Chest x-ray interpreted by me demonstrating endotracheal tube in appropriate position and CO2 detector.    ETT TO TEETH: 23 cm  Tube secured with: ETT smith    Patient tolerated the procedure well with no immediate complications.  COMPLICATIONS:  Small abrasion to the left upper lip.    Interventions:  1619 Amidate 20 mg IV  1621 Zemuron 100 mg IV  1622 0.9% Sodium Chloride 1,000 mL IV  1626 Fentanyl 100 mcg IV  1627 Diprivan 20 mcg/kg/min IV  1636 Diprivan 30 mcg/kg/min  1636 Fentanyl 50 mcg IV  1656 Diprivan 40 mcg/kg/min  1742 Apresoline 10 mg IV  1742 Merrem 1 g IV  1743 Fentanyl 50 mcg IV  1804 Diprivan 20 mcg/kg/min IV  1815 Vancocin 2,500 mg IV  1840 Fentanyl 50 mcg IV  1913 Fentanyl 25 mcg/hr IV    Emergency Department Course:  Nursing notes and vitals reviewed.  I performed an exam of the patient as documented above.   IV inserted. Medicine administered as documented above.  EKG obtained in the ED, see results above.   The patient was sent for images while in the emergency department, findings above.   Blood drawn. This was sent to the lab for further testing, results above.    1559 Dr. Jauregui and Dr. Jeffries responded to the call.  1559 Pt reported to be Full code by EMS. Initial exam performed. GCS 5 (E1V1M3)  1608 RN staff established peripheral IV. Pre-oxygenation started with HFNC and facemask. Not hypoglycemic.  1615 O2 saturation 100%. VSS otherwise  1620 RSI medications given.  1621 The patient was intubated.   1625    OGT placed by RN staff. VSS. Head CT juju  1636 Patient returned from CT. I reassessed the  patient. VSS. Head CT images reviewed, no major acute findings per my prelim read. Will await radiology read.  1652 I rechecked the patient after she returned from CT.   1718 I rechecked the patient.   1748 I discussed the case with Dr. Melissa of the intensivist service. They are in agreement to accept the patient for admission.  1800 I rechecked the patient.  1829 I rechecked the patient to see if she had any other sources of infection.    Findings and plan explained to the Patient who consents to admission. Discussed the patient with Dr. Melissa, who will admit the patient to a bed for further monitoring, evaluation, and treatment.      Impression & Plan    Medical Decision Making:  Gem Jade is a 72 year old female presents with altered mental status, initial GCS of 5. Patient, as noted above, was recently admitted to the ICU with similar presentation thought to be due to urosepsis and mixed metabolic/septic encephalopathy which also requited intubation at that time for airway protection. Patient had no meaningful interactions whatsoever. She was full code. Given the concern for potential airway compromise or airway loss and given her profoundly altered/comatose state, the decision was made to intubate the patient to facilitate workup and treatment of her illness. Vital signs were stable. There is no evidence of shock. Patient was afebrile. Broad differential diagnosis was considered, including intracranial causes, hypertensive encephalopathy, stroke, intracranial hemorrhage, seizure, sepsis, or other metabolic encephalopathy. IV fluid was provided. Patient was started on Meropenem  given her multi-drug resistant organisms in the past. Cultures were sent. Head CT showed no acute findings. The remainder of her workup was rather unremarkable. There was no evidence of elevated pCO2 that would explain her altered mental status. Utox and tylenol/ASA levels negative. After IV fluids, resuscitation and antibiotics,  patient actually became more alert. Sedation maintained in the ED. The case was discussed with Dr. Melissa of the intensivist service. She was admitted in stable condition.     Critical Care time: was 63 minutes for this patient excluding procedures.    Diagnosis:    ICD-10-CM    1. Hawthorne coma scale total score 3-8, at arrival to emergency department (H) R40.2432 CANCELED: Blood gas arterial with oxyhemoglobin   2. Encephalopathy G93.40    3. Elevated blood pressure reading R03.0        Disposition:  Admitted to Dr. Melissa of the intensivist service.    I, Wendie Houston, am serving as a scribe on 12/19/2017 at 03:59 PM to personally document services performed by Zan Jauregui MD based on my observations and the provider's statements to me.       12/19/2017    EMERGENCY DEPARTMENT       Zan Jauregui MD  12/19/17 1658

## 2017-12-19 NOTE — ED NOTES
Bed: ST01  Expected date:   Expected time:   Means of arrival:   Comments:  518 72F responsive to painful stimuli

## 2017-12-19 NOTE — IP AVS SNAPSHOT
[address], [address]    [facility] [address]    Phone:  [phone]                                       After Visit Summary   12/19/2017    Gem Jade    MRN: [id number]           After Visit Summary Signature Page     I have received my discharge instructions, and my questions have been answered. I have discussed any challenges I see with this plan with the nurse or doctor.    ..........................................................................................................................................  Patient/Patient Representative Signature      ..........................................................................................................................................  Patient Representative Print Name and Relationship to Patient    ..................................................               ................................................  Date                                            Time    ..........................................................................................................................................  Reviewed by Signature/Title    ...................................................              ..............................................  Date                                                            Time

## 2017-12-20 LAB
AMMONIA PLAS-SCNC: 17 UMOL/L (ref 10–50)
ANION GAP SERPL CALCULATED.3IONS-SCNC: 8 MMOL/L (ref 3–14)
BACTERIA SPEC CULT: NO GROWTH
BUN SERPL-MCNC: 33 MG/DL (ref 7–30)
CALCIUM SERPL-MCNC: 8.1 MG/DL (ref 8.5–10.1)
CHLORIDE SERPL-SCNC: 106 MMOL/L (ref 94–109)
CO2 SERPL-SCNC: 25 MMOL/L (ref 20–32)
CREAT SERPL-MCNC: 1.13 MG/DL (ref 0.52–1.04)
ERYTHROCYTE [DISTWIDTH] IN BLOOD BY AUTOMATED COUNT: 14.1 % (ref 10–15)
GFR SERPL CREATININE-BSD FRML MDRD: 47 ML/MIN/1.7M2
GLUCOSE BLDC GLUCOMTR-MCNC: 102 MG/DL (ref 70–99)
GLUCOSE BLDC GLUCOMTR-MCNC: 110 MG/DL (ref 70–99)
GLUCOSE BLDC GLUCOMTR-MCNC: 118 MG/DL (ref 70–99)
GLUCOSE BLDC GLUCOMTR-MCNC: 121 MG/DL (ref 70–99)
GLUCOSE BLDC GLUCOMTR-MCNC: 87 MG/DL (ref 70–99)
GLUCOSE SERPL-MCNC: 123 MG/DL (ref 70–99)
HCT VFR BLD AUTO: 34.8 % (ref 35–47)
HGB BLD-MCNC: 11.4 G/DL (ref 11.7–15.7)
Lab: NORMAL
MCH RBC QN AUTO: 30.6 PG (ref 26.5–33)
MCHC RBC AUTO-ENTMCNC: 32.8 G/DL (ref 31.5–36.5)
MCV RBC AUTO: 93 FL (ref 78–100)
PLATELET # BLD AUTO: 135 10E9/L (ref 150–450)
POTASSIUM SERPL-SCNC: 3.7 MMOL/L (ref 3.4–5.3)
RBC # BLD AUTO: 3.73 10E12/L (ref 3.8–5.2)
SODIUM SERPL-SCNC: 139 MMOL/L (ref 133–144)
SPECIMEN SOURCE: NORMAL
VANCOMYCIN SERPL-MCNC: 20.1 MG/L
WBC # BLD AUTO: 6 10E9/L (ref 4–11)

## 2017-12-20 PROCEDURE — 36415 COLL VENOUS BLD VENIPUNCTURE: CPT | Performed by: PHYSICIAN ASSISTANT

## 2017-12-20 PROCEDURE — 00000146 ZZHCL STATISTIC GLUCOSE BY METER IP

## 2017-12-20 PROCEDURE — 25000128 H RX IP 250 OP 636: Performed by: EMERGENCY MEDICINE

## 2017-12-20 PROCEDURE — 25000128 H RX IP 250 OP 636: Performed by: PHYSICIAN ASSISTANT

## 2017-12-20 PROCEDURE — 82140 ASSAY OF AMMONIA: CPT | Performed by: PHYSICIAN ASSISTANT

## 2017-12-20 PROCEDURE — 40000556 ZZH STATISTIC PERIPHERAL IV START W US GUIDANCE

## 2017-12-20 PROCEDURE — 80202 ASSAY OF VANCOMYCIN: CPT | Performed by: INTERNAL MEDICINE

## 2017-12-20 PROCEDURE — 36415 COLL VENOUS BLD VENIPUNCTURE: CPT | Performed by: INTERNAL MEDICINE

## 2017-12-20 PROCEDURE — 85027 COMPLETE CBC AUTOMATED: CPT | Performed by: INTERNAL MEDICINE

## 2017-12-20 PROCEDURE — 99207 ZZC APP CREDIT; MD BILLING SHARED VISIT: CPT | Performed by: PHYSICIAN ASSISTANT

## 2017-12-20 PROCEDURE — A9270 NON-COVERED ITEM OR SERVICE: HCPCS | Mod: GY | Performed by: INTERNAL MEDICINE

## 2017-12-20 PROCEDURE — 20000003 ZZH R&B ICU

## 2017-12-20 PROCEDURE — 25000125 ZZHC RX 250: Performed by: INTERNAL MEDICINE

## 2017-12-20 PROCEDURE — 40000008 ZZH STATISTIC AIRWAY CARE

## 2017-12-20 PROCEDURE — 94003 VENT MGMT INPAT SUBQ DAY: CPT

## 2017-12-20 PROCEDURE — 25000128 H RX IP 250 OP 636

## 2017-12-20 PROCEDURE — 25000132 ZZH RX MED GY IP 250 OP 250 PS 637: Mod: GY | Performed by: INTERNAL MEDICINE

## 2017-12-20 PROCEDURE — 40000257 ZZH STATISTIC CONSULT NO CHARGE VASC ACCESS

## 2017-12-20 PROCEDURE — 25000131 ZZH RX MED GY IP 250 OP 636 PS 637: Mod: GY | Performed by: INTERNAL MEDICINE

## 2017-12-20 PROCEDURE — 80048 BASIC METABOLIC PNL TOTAL CA: CPT | Performed by: INTERNAL MEDICINE

## 2017-12-20 PROCEDURE — 99291 CRITICAL CARE FIRST HOUR: CPT | Performed by: INTERNAL MEDICINE

## 2017-12-20 PROCEDURE — 40000275 ZZH STATISTIC RCP TIME EA 10 MIN

## 2017-12-20 PROCEDURE — 25000128 H RX IP 250 OP 636: Performed by: INTERNAL MEDICINE

## 2017-12-20 RX ORDER — HYDRALAZINE HYDROCHLORIDE 20 MG/ML
5 INJECTION INTRAMUSCULAR; INTRAVENOUS ONCE
Status: COMPLETED | OUTPATIENT
Start: 2017-12-20 | End: 2017-12-20

## 2017-12-20 RX ADMIN — TACROLIMUS 2 MG: 5 CAPSULE ORAL at 00:11

## 2017-12-20 RX ADMIN — SODIUM CHLORIDE, POTASSIUM CHLORIDE, SODIUM LACTATE AND CALCIUM CHLORIDE: 600; 310; 30; 20 INJECTION, SOLUTION INTRAVENOUS at 16:54

## 2017-12-20 RX ADMIN — CLONIDINE HYDROCHLORIDE 0.1 MG: 0.1 TABLET ORAL at 20:00

## 2017-12-20 RX ADMIN — TACROLIMUS 2 MG: 5 CAPSULE ORAL at 09:53

## 2017-12-20 RX ADMIN — LABETALOL HYDROCHLORIDE 20 MG: 5 INJECTION, SOLUTION INTRAVENOUS at 00:51

## 2017-12-20 RX ADMIN — GABAPENTIN 300 MG: 250 SUSPENSION ORAL at 21:12

## 2017-12-20 RX ADMIN — LABETALOL HYDROCHLORIDE 10 MG: 5 INJECTION, SOLUTION INTRAVENOUS at 11:11

## 2017-12-20 RX ADMIN — MICONAZOLE NITRATE: 2 POWDER TOPICAL at 09:54

## 2017-12-20 RX ADMIN — MEROPENEM AND SODIUM CHLORIDE 1 G: 1 INJECTION, SOLUTION INTRAVENOUS at 01:26

## 2017-12-20 RX ADMIN — PRAVASTATIN SODIUM 10 MG: 10 TABLET ORAL at 08:24

## 2017-12-20 RX ADMIN — PREDNISONE 5 MG: 5 TABLET ORAL at 08:24

## 2017-12-20 RX ADMIN — CLONIDINE HYDROCHLORIDE 0.1 MG: 0.1 TABLET ORAL at 08:24

## 2017-12-20 RX ADMIN — ASPIRIN 81 MG 81 MG: 81 TABLET ORAL at 08:24

## 2017-12-20 RX ADMIN — CHLORHEXIDINE GLUCONATE 15 ML: 1.2 RINSE ORAL at 20:10

## 2017-12-20 RX ADMIN — ENOXAPARIN SODIUM 40 MG: 40 INJECTION SUBCUTANEOUS at 21:15

## 2017-12-20 RX ADMIN — SODIUM CHLORIDE, POTASSIUM CHLORIDE, SODIUM LACTATE AND CALCIUM CHLORIDE 500 ML: 600; 310; 30; 20 INJECTION, SOLUTION INTRAVENOUS at 11:12

## 2017-12-20 RX ADMIN — OXCARBAZEPINE 300 MG: 300 TABLET, FILM COATED ORAL at 20:00

## 2017-12-20 RX ADMIN — LABETALOL HYDROCHLORIDE 10 MG: 5 INJECTION, SOLUTION INTRAVENOUS at 10:32

## 2017-12-20 RX ADMIN — FAMOTIDINE 20 MG: 20 TABLET ORAL at 08:24

## 2017-12-20 RX ADMIN — OXCARBAZEPINE 300 MG: 300 TABLET, FILM COATED ORAL at 08:24

## 2017-12-20 RX ADMIN — VANCOMYCIN HYDROCHLORIDE 1750 MG: 10 INJECTION, POWDER, LYOPHILIZED, FOR SOLUTION INTRAVENOUS at 23:43

## 2017-12-20 RX ADMIN — PROPOFOL 20 MCG/KG/MIN: 10 INJECTION, EMULSION INTRAVENOUS at 06:22

## 2017-12-20 RX ADMIN — MEROPENEM AND SODIUM CHLORIDE 1 G: 1 INJECTION, SOLUTION INTRAVENOUS at 17:59

## 2017-12-20 RX ADMIN — MICONAZOLE NITRATE: 2 POWDER TOPICAL at 21:15

## 2017-12-20 RX ADMIN — GABAPENTIN 300 MG: 250 SUSPENSION ORAL at 09:53

## 2017-12-20 RX ADMIN — TACROLIMUS 2 MG: 5 CAPSULE ORAL at 21:12

## 2017-12-20 RX ADMIN — HYDRALAZINE HYDROCHLORIDE 5 MG: 20 INJECTION INTRAMUSCULAR; INTRAVENOUS at 01:23

## 2017-12-20 RX ADMIN — FAMOTIDINE 20 MG: 20 TABLET ORAL at 20:00

## 2017-12-20 RX ADMIN — CHLORHEXIDINE GLUCONATE 15 ML: 1.2 RINSE ORAL at 09:54

## 2017-12-20 RX ADMIN — MEROPENEM AND SODIUM CHLORIDE 1 G: 1 INJECTION, SOLUTION INTRAVENOUS at 09:53

## 2017-12-20 RX ADMIN — LEVOTHYROXINE SODIUM 50 MCG: 50 TABLET ORAL at 08:24

## 2017-12-20 NOTE — PHARMACY-VANCOMYCIN DOSING SERVICE
Pharmacy Vancomycin Initial Note  Date of Service 2017  Patient's  1945  72 year old, female    Indication: Urinary Tract Infection    Current estimated CrCl = CrCl cannot be calculated (Unknown ideal weight.).    Creatinine for last 3 days  2017:  4:02 PM Creatinine 1.21 mg/dL    Recent Vancomycin Level(s) for last 3 days  No results found for requested labs within last 72 hours.      Vancomycin IV Administrations (past 72 hours)                   vancomycin (VANCOCIN) 2,500 mg in NaCl 0.9 % 500 mL intermittent infusion (mg) 2,500 mg New Bag 17                Nephrotoxins and other renal medications (Future)    Start     Dose/Rate Route Frequency Ordered Stop    17  tacrolimus (GENERIC EQUIVALENT) capsule 2 mg      2 mg Oral 2 TIMES DAILY 17  vancomycin (VANCOCIN) 1000 mg in dextrose 5% 200 mL PREMIX      1,000 mg  200 mL/hr over 1 Hours Intravenous EVERY 24 HOURS 17            Contrast Orders - past 72 hours     None                Plan:  1.  Start vancomycin  Intermittent dosing, received Vanco 2500 mg IV x1 in ED.  2.  Goal Trough Level: 15   3.  Pharmacy will check trough levels as appropriate in 1-3 Days.    4. Serum creatinine levels will be ordered daily for the first week of therapy and at least twice weekly for subsequent weeks.    5. Kotlik method utilized to dose vancomycin therapy: Method 2    Eunice Henderson

## 2017-12-20 NOTE — PLAN OF CARE
Problem: Patient Care Overview  Goal: Plan of Care/Patient Progress Review  OT/PT: Orders received and chart reviewed. Per discussion with NP, will hold therapy at this time to allow medial work up and progress

## 2017-12-20 NOTE — H&P
Intensivist Daily Note  12/19/2017    History of Present Illness:    Ms. Gem Jade is a 72 yof with a history of neurogenic bladder with chronic indwelling rico, recurrent UTIs, and kidney transplant (1999) maintained on tacrolimus and 5 mg prednisone who is admitted with sepsis from urinary source. Patient was brought in by EMS after being found minimally responsive at home.  In the ER she was hemodynamically stable but remained obtunded and was intubated for airway protection.  CT head was obtained and negative for acute pathology.  UA appeared dirty with many bacteria, RBCs and Leuk esterase.  Blood and Urine cultures were obtained and she was started empirically on Vancomycin and Meropenem.  She has had many admissions for urosepsis, most recently in early November when she had a very similar presentation to this one.  Discussion was had regarding long term suppressive antibiotics, but this was not pursued due to concerns for recurrent/drug resistant C. Diff.      On arrival to the ICU, she is intubated, sedated and hemodynamically stable.        ROS:  10 of 14 systems reviewed and negative unless otherwise noted in HPI.    Past Medical History:   Diagnosis Date     Background diabetic retinopathy(362.01)     extensive diabetic retinopathy, s/p multiple laser treatments     Diabetic gastroparesis (H)     followed by [facility] (Dr. 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)      Urinary tract infection, site not specified     Chronic UTIs      UTI (urinary tract infection) due to urinary indwelling catheter (H)      Past Surgical History:   Procedure Laterality Date     C NONSPECIFIC PROCEDURE  10/99    kidney transplant     C NONSPECIFIC PROCEDURE  8/00    MRI head, lumbar spine, pelvis     C TOTAL KNEE ARTHROPLASTY  3/03    Knee Replacement, Total right, post op PE     CHOLECYSTECTOMY       HC LEFT HEART CATHETERIZATION  1999    Cardiac Cath, Left Heart, Negative  (@ [facility])     HC LEFT HEART CATHETERIZATION  4/05    normal coronary angiogram at [facility], had mild troponin rise (0.71)     HC REMV CATARACT EXTRACAP,INSERT LENS  8/04    bilateral cataract repaired, left eye      Family History     Problem (# of Occurrences) Relation (Name,Age of Onset)    DIABETES (2) Mother, Brother    HEART DISEASE (1) Father    Hypertension (1) Father: [id number] pneumonia        Social History     Social History     Marital status:      Spouse name: N/A     Number of children: N/A     Years of education: N/A     Occupational History     Not on file.     Social History Main Topics     Smoking status: Former Smoker     Years: 17.00     Quit date: 6/1/1987     Smokeless tobacco: Not on file     Alcohol use No     Drug use: No     Sexual activity: Yes     Partners: Male     Other Topics Concern     Not on file     Social History Narrative       Allergy:  Allergies   Allergen Reactions     Bactrim Ds [Sulfamethoxazole W/Trimethoprim]      Creatinine level increased     Atorvastatin Calcium      myalgias, muscle aches     Codeine Nausea and Vomiting     Darvocet [Propoxyphene N-Apap] Nausea and Vomiting     Hydromorphone      Levofloxacin Other (See Comments) and Diarrhea     hallucinations     Morphine      Nausea and vomiting     Niaspan [Niacin] GI Disturbance     Flushing even at low dose, GI upset     Percocet [Oxycodone-Acetaminophen]      Vomiting after taking med couple of times     Vicodin [Hydrocodone-Acetaminophen] Nausea and Vomiting         Medications:  Current Facility-Administered Medications   Medication     propofol (DIPRIVAN) infusion     fentaNYL (SUBLIMAZE) infusion     tacrolimus (GENERIC EQUIVALENT) capsule 2 mg     predniSONE (DELTASONE) tablet 5 mg     pravastatin (PRAVACHOL) tablet 10 mg     OXcarbazepine (TRILEPTAL) tablet 300 mg     levothyroxine (SYNTHROID/LEVOTHROID) tablet 50 mcg     gabapentin (NEURONTIN) solution 900 mg     insulin glargine (LANTUS) injection 15 Units     aspirin chewable tablet 81 mg     baclofen (LIORESAL) tablet 10 mg     cinacalcet (SENSIPAR) tablet 30 mg     cloNIDine (CATAPRES) tablet 0.1 mg     glucose 40 % gel 15-30 g    Or     dextrose 50 % injection 25-50 mL    Or     glucagon injection 1 mg     naloxone (NARCAN) injection 0.1-0.4 mg     chlorhexidine (PERIDEX) 0.12 % solution 15 mL     enoxaparin (LOVENOX) injection 40 mg     senna-docusate (SENOKOT-S;PERICOLACE) 8.6-50 MG per tablet 1 tablet    Or     senna-docusate (SENOKOT-S;PERICOLACE) 8.6-50 MG per tablet 2 tablet     lactated ringers infusion     labetalol (NORMODYNE/TRANDATE) injection 20 mg     miconazole (MICATIN; MICRO GUARD) 2 % powder     meropenem (MERREM) intermittent 1g in NS PREMIX     vancomycin (VANCOCIN) 1000 mg in dextrose 5% 200 mL PREMIX         Physical examination:  Vital signs:  Temp: 96.6  F (35.9  C) Temp src: Axillary BP: 129/60 Pulse: 72 Heart Rate: 62 Resp: 17 SpO2: 100 % O2 Device: Mechanical Ventilator      General: Intubated, sedated and in NAD.  HEENT: neck supple, symmetrical  Lungs: Clear to auscultation, no wheezing  CVS: Normal S1 & S2, no murmur  Abdomen: Bowel sounds present, soft, non tender  Extremities/musculoskeletal: 1+ lower extremity edema  Neurology: no focal deficits.  Skin: no rash.  + blanchable, skin break down over sacral coccygeal area.  Exam of Line sites: No erythema or discharge.    Data:    ROUTINE ICU LABS (Last four results)  CMP  Recent Labs  Lab 12/19/17  1602      POTASSIUM 4.7    CHLORIDE 104   CO2 30   ANIONGAP 4   *   BUN 38*   CR 1.21*   GFRESTIMATED 44*   GFRESTBLACK 53*   ROGER 8.5   PROTTOTAL 7.7   ALBUMIN 3.0*   BILITOTAL 0.4   ALKPHOS 83   AST 16   ALT 21     CBC  Recent Labs  Lab 12/19/17  1602   WBC 6.9   RBC 3.82   HGB 11.6*   HCT 36.2   MCV 95   MCH 30.4   MCHC 32.0   RDW 14.0        INRNo lab results found in last 7 days.  Arterial Blood Gas  Recent Labs  Lab 12/19/17  1745   PH 7.36   PCO2 48*   PO2 161*   HCO3 27       Recent Results (from the past 24 hour(s))   Chest  XR, 1 view PORTABLE    Narrative    CHEST ONE VIEW PORTABLE  12/19/2017 4:34 PM      HISTORY: Post intubation, gastric tube confirmation.    COMPARISON: 11/15/2017.    FINDINGS: Supine portable chest. An ET tube is in place approximately  3 cm above the abena. NG tube passes in the stomach. No pneumothorax.  The heart size is normal. Thoracic aorta is calcified. Minimal left  basilar atelectasis. Lungs are otherwise clear.      Impression    IMPRESSION: NG tube into the stomach.    STEFFANY KAUR MD   Head CT w/o contrast    Narrative    CT SCAN OF THE HEAD WITHOUT CONTRAST   12/19/2017 4:47 PM     HISTORY: Altered mental status; unresponsive for 2 days, possible  urinary tract infection.    TECHNIQUE:  Axial images of the head and coronal reformations without  IV contrast material. Radiation dose for this scan was reduced using  automated exposure control, adjustment of the mA and/or kV according  to patient size, or iterative reconstruction technique.    COMPARISON: 11/13/2017    FINDINGS:  There is generalized atrophy of the brain.  There is low  attenuation in the white matter of the cerebral hemispheres consistent  with sequelae of small vessel ischemic disease. There is no evidence  of intracranial hemorrhage, mass, acute infarct or anomaly.     The visualized portions of the sinuses and mastoids appear normal.  There is no evidence of trauma.      Impression    IMPRESSION:   1. No acute  abnormality.  2. Atrophy of the brain.  White matter changes consistent with  sequelae of small vessel ischemic disease.  3. No change.      ELIZABETH GRIMM MD         Assessment and Plan:    Neuro:  ### Analgesia and Sedation  ### Polyneuropathy 2/2 DM; Chronic Pain  ### Acute Encephalopathy -- 2/2 acute infection  -Fentanyl + Propofol gtt  -Cont. Home Baclofen prn dosing  -Dose reduced Gabapentin to 300 mg BID (home dosing 900 mg BID)  -Cont. Home Trileptal 300 mg BID  -minimize sensorium altering drugs as able      Respiratory  ### Acute Hypoxic Respiratory failure s/p intubation for airway protection  -Intubated, mechanically ventilated  -SBT in a.m.      CV:  ### Sepsis from Urinary Source   ### Hypertension  ### HLP  -Vanco/Preston as below; Johnson changed  -MAP goal > 65  -Cont. Home clonidine 0.1 mg BID  -Home Labetalol held; IV Labetalol 20 mg q4h prn for SBP > 170  -Cont. Home Pravastatin    Renal:  ### s/p Kidney txplt in 1999  ### Crt currently lower than baseline  -Renal consult placed  -Cont. Tacrolimus 2 mg BID; further tacro levels and dosing per nephrology or pharmacy  -Continue Prednisone 5 mg daily    +Patient is normotensive to hypertensive -- will hold off on stress dose steroids at this time  -Hold Cinacalcet while NPO  -Monitor crt; renally dose meds; avoid nephrotoxins as able    Heme:  ### H/o DVT/PE  ### Anemia -- hgb stable  -Cont. ASA 81 mg  -monitor hgb; transfuse for < 7    ID:  ### Recurrent UTI; actively infected  -Meropenem and Vancomycin (12/19-  -Johnson changed in ED 12/19  -Aerobic blood and fungal blood cx pending  -F/u urine cultures    Endo:  ### Type 2 Diabetes w/ gastroparesis, retinopathy, and polyneuropathy  ### Hypothyroidism  -Continue home Levothyroxine  -Hold home Lantus while NPO  -SSI; glucose management per ICU protocol      FEN:  -1L LR Bolus now  -Monitor Electrolytes  -Nutrition c/s in am if remains intubated  -Senna/Colace for Bowel regimen    Prophy:  GI:  H2 Blocker    DVT:  LMWH     ICU Cares:  Restrain needed: Yes   Lines needed: Yes; IVs, Johnson  Code: Full  Dispo: ICU Status      Billing: Critical care time 60 min excluding procedure time.

## 2017-12-20 NOTE — PLAN OF CARE
Problem: Patient Care Overview  Goal: Plan of Care/Patient Progress Review  Neuro:  Pt responds to painful stimuli in all extremities. PEERL, but sluggish. Sedated on propofol and fent, tolerating well.   CV: SR to SB. Pt received PRN labetelol and 1 time does of hydralazine for BP over 170, responded well but then became hypotensive and corrected on own. LR bolus given over 3 hours.   Pulm: On vent, , 40%, 16, peep 5. Clear to dim lung sounds. minimal ETT secretions.   GI/: Pt has chronic rico, cloudy/sediment/yellow urine. Yeast noted, miconazole powder applied.   Skin: Blanchable bottom, but red, mepilex applied. Skin tear noted in groin, miconazole powder applied and scabbing on lip.   Lines: R ac peripheral infusing.  Restraints: charted and remain in place for adequacy of lines and patient safety.   Plan:  updated via phone,will continue to monitor.

## 2017-12-20 NOTE — PROGRESS NOTES
[facility]    Critical Care Service  Progress Note    Date of Service (when I saw the patient): 12/20/2017     Main Plans for Today   - D/C propofol gtt  - D/C fentanyl gtt  - PS when able with goal of extubation  - 500 cc LR bolus   - increase mIVF    Assessment & Plan   Ms. Gem Jade is a 72 yof with a history of neurogenic bladder with chronic indwelling rico, recurrent UTIs, and kidney transplant (1999) who is admitted on 12/19 with sepsis from urinary source, intubated in the ED for altered mental status.     Neuro:  1. Analgesia and Sedation  2. Polyneuropathy 2/2 DM; Chronic Pain  3. Acute Encephalopathy -- 2/2 acute infection  - D/C Fentanyl + Propofol gtt  -Cont home Baclofen prn dosing  -Dose reduced Gabapentin to 300 mg BID (home dosing 900 mg BID)  -Cont. Home Trileptal 300 mg BID     Respiratory  1. Acute Hypoxic Respiratory failure s/p intubation for airway protection  - Intubated, mechanically ventilated  - will wean off sedation and then pressure support when patient breathing over ventilator  - goal to extubate today if able      CV:  1. Sepsis from Urinary Source   2. Hypertension, labile BPs   3.  HLP  - Vanco/Preston as below; Rico changed  - MAP goal > 65  - Cont home clonidine 0.1 mg BID  - Home Labetalol held; IV Labetalol 20 mg q4h prn for SBP > 170  - Cont. Home Pravastatin     Renal:  1. s/p Kidney txplt in 1999  2.Crt currently lower than baseline  3. Oliguria  -Renal consult placed given kidney transplant hx  -Cont. Tacrolimus 2 mg BID; further tacro levels and dosing per nephrology or pharmacy  -Continue Prednisone 5 mg daily   -Patient is normotensive to hypertensive -- will hold off on stress dose steroids at this time  -Hold Cinacalcet while NPO  -Monitor crt; renally dose meds; avoid nephrotoxins as able  - drop in UOP this AM, will increase mIVF    Heme:  1. H/o DVT/PE  2. Anemia -- hgb stable  -Cont. ASA 81 mg  -monitor hgb; transfuse for < 7     ID:  1.  Recurrent UTI; actively infected  -Meropenem and Vancomycin (12/19-  -Johnson changed in ED 12/19  -Aerobic blood and fungal blood cx pending  -F/u urine cultures     Endo:  1. Type 2 Diabetes w/ gastroparesis, retinopathy, and polyneuropathy  2. Hypothyroidism  -Continue home Levothyroxine  -Hold home Lantus while NPO  -SSI; glucose management per ICU protocol     FEN:  - will increase mIVF to 75cc/hr   -Monitor Electrolytes  -Nutrition c/s in if remains intubated  -Senna/Colace for Bowel regimen     Prophy:  GI:  H2 Blocker   DVT:  LMWH      ICU Cares:  Restrain needed: Yes   Lines needed: Yes; IVs, Johnson  Code: Full  Dispo: ICU Status    Barb Elaine    Time Spent on this Encounter   Billing:  I spent 45 minutes bedside and on the inpatient unit today managing the critical care of Gem Jade in relation to the issues listed in this note.    Interval History   Course reviewed. Patient admitted overnight for altered mental status requiring intubation for airway protection and urosepsis. Patient remained intubated overnight and sedated. UOP declining this AM    Physical Exam   Temp: 96.1  F (35.6  C) Temp src: Axillary Temp  Min: 96.1  F (35.6  C)  Max: 98.5  F (36.9  C) BP: 100/56 Pulse: 72 Heart Rate: 53 Resp: 13 SpO2: 100 % O2 Device: Mechanical Ventilator    Vitals:    12/19/17 1611 12/19/17 2000 12/20/17 0400   Weight: 99.8 kg (220 lb 0.3 oz) 100.4 kg (221 lb 5.5 oz) 100.4 kg (221 lb 5.5 oz)     I/O last 3 completed shifts:  In: 1337.26 [I.V.:247.26; NG/GT:90; IV Piggyback:1000]  Out: 950 [Urine:950]    GEN: Resting in bed, sedated on exam   EYES: PERRL, Anicteric sclera.   HEENT:  Normocephalic, atraumatic, trachea midline, ETT secure  CV: RRR, no murmurs  PULM/CHEST: Diminished breath sounds in the bases, no crackles or wheeze  GI: hypoactive bowel sounds, soft, non-tender  EXTREMITIES: 1+ peripheral edema  NEURO: PERRL, does not follow commands   SKIN: warm and dry   ECG- NSR    Medications     propofol  (DIPRIVAN) infusion 10 mcg/kg/min (12/20/17 0915)     lactated ringers 75 mL/hr at 12/20/17 0934       tacrolimus  2 mg Oral BID     predniSONE  5 mg Oral Daily     pravastatin  10 mg Oral Daily     OXcarbazepine  300 mg Oral BID     levothyroxine  50 mcg Oral Daily     aspirin  81 mg Oral Daily     cinacalcet  30 mg Oral Daily     cloNIDine  0.1 mg Oral BID     chlorhexidine  15 mL Mouth/Throat Q12H     enoxaparin  40 mg Subcutaneous Q24H     miconazole   Topical BID     meropenem  1 g Intravenous Q8H     vancomycin place smith - receiving intermittent dosing  1 each Does not apply See Admin Instructions     gabapentin  300 mg Oral BID     insulin aspart  1-6 Units Subcutaneous Q4H     famotidine  20 mg Oral BID       Data     Recent Labs  Lab 12/20/17  0525 12/19/17  1602   WBC 6.0 6.9   HGB 11.4* 11.6*   MCV 93 95   * 156    138   POTASSIUM 3.7 4.7   CHLORIDE 106 104   CO2 25 30   BUN 33* 38*   CR 1.13* 1.21*   ANIONGAP 8 4   ROGER 8.1* 8.5   * 158*   ALBUMIN  --  3.0*   PROTTOTAL  --  7.7   BILITOTAL  --  0.4   ALKPHOS  --  83   ALT  --  21   AST  --  16     Recent Results (from the past 24 hour(s))   Chest  XR, 1 view PORTABLE    Narrative    CHEST ONE VIEW PORTABLE  12/19/2017 4:34 PM      HISTORY: Post intubation, gastric tube confirmation.    COMPARISON: 11/15/2017.    FINDINGS: Supine portable chest. An ET tube is in place approximately  3 cm above the abena. NG tube passes in the stomach. No pneumothorax.  The heart size is normal. Thoracic aorta is calcified. Minimal left  basilar atelectasis. Lungs are otherwise clear.      Impression    IMPRESSION: NG tube into the stomach.    STEFFANY KAUR MD   Head CT w/o contrast    Narrative    CT SCAN OF THE HEAD WITHOUT CONTRAST   12/19/2017 4:47 PM     HISTORY: Altered mental status; unresponsive for 2 days, possible  urinary tract infection.    TECHNIQUE:  Axial images of the head and coronal reformations without  IV contrast material.  Radiation dose for this scan was reduced using  automated exposure control, adjustment of the mA and/or kV according  to patient size, or iterative reconstruction technique.    COMPARISON: 11/13/2017    FINDINGS:  There is generalized atrophy of the brain.  There is low  attenuation in the white matter of the cerebral hemispheres consistent  with sequelae of small vessel ischemic disease. There is no evidence  of intracranial hemorrhage, mass, acute infarct or anomaly.     The visualized portions of the sinuses and mastoids appear normal.  There is no evidence of trauma.      Impression    IMPRESSION:   1. No acute abnormality.  2. Atrophy of the brain.  White matter changes consistent with  sequelae of small vessel ischemic disease.  3. No change.      ELIZABETH GRIMM MD   XR Chest Port 1 View    Narrative    CHEST ONE VIEW PORTABLE   12/19/2017 8:55 PM     HISTORY: Endotracheal tube positioning.    COMPARISON: 12/19/2017 at 1552 hours      Impression    IMPRESSION: Endotracheal tube in good position. Nasogastric tube  passes into the abdomen. Lungs are clear. Mild cardiomegaly. Normal  pulmonary vascularity. No change.    ELIZABETH GRIMM MD       Critical Care Services Progress Note:     Gem Jade remains critically ill with mental status changes, presumed sepsis and renal insufficiency     I personally examined and evaluated the patient today.   The patient s prognosis today is stable  I have evaluated all laboratory values and imaging studies from the past 24 hours.  Key findings and decisions made today included nephrology consultation, antibiotics, spontaneous breathing trial  I personally managed the ventilator, sedation, pain control and analgesia, metabolic abnormalities, antibiotic therapy and nutritional status.   Consults ongoing and ordered are Nephrology  Procedures that will happen today are: mechanical ventilation  All treatments were placed at my direction.  I formulated today s plan with the  house staff team or resident(s) and agree with the findings and plan in the associated note.       The above plans and care have been discussed with bedside nurse and all questions and concerns were addressed.     I spent a total of 35 additional minutes (excluding procedure time) personally providing and directing critical care services at the bedside and on the critical care unit for eGm Jade.        Boris Christianson

## 2017-12-20 NOTE — PROGRESS NOTES
[facility] ICU RESPIRATORY NOTE  Date of Admission: 12/19/17  Date of Intubation (most recent): 12/19/17  Reason for Mechanical Ventilation: airway protection  Number of Days on Mechanical Ventilation:1  Met Criteria for Pressure Support Trial:  Length of Pressure Support Trial:  Reason for Stopping Pressure Support Trial:  Reason for No Pressure Support Trial:    Significant Events Today: Pt was admitted through the ED with urosepsis. Intubated for airway protection. Is currently on the following vent settings:    Ventilation Mode: CMV/AC  FiO2 (%): 40 %  Rate Set (breaths/minute): 16 breaths/min  Tidal Volume Set (mL): 450 mL  PEEP (cm H2O): 5 cmH2O  Oxygen Concentration (%): 40 %  Resp: 14      ABG Results:      Recent Labs  Lab 12/19/17  1745   PH 7.36   PCO2 48*   PO2 161*   HCO3 27       ETT appearance on chest x-ray: IN good position.     Plan:  Cont full support. Assess daily for weaning.     12/20/2017  Zaira Fox RRT

## 2017-12-20 NOTE — PHARMACY-ADMISSION MEDICATION HISTORY
Admission medication history interview status for the 12/19/2017  admission is complete. See EPIC admission navigator for prior to admission medications     Medication history source reliability:Good    Actions taken by pharmacist (provider contacted, etc):Called spouse and he looked at the bottles, verified insulin and couple other meds with home health care nurse, Yashira, [phone].    Additional medication history information not noted on PTA med list :None    Medication reconciliation/reorder completed by provider prior to medication history? No    Time spent in this activity: 25 min    Prior to Admission medications    Medication Sig Last Dose Taking? Auth Provider   BACLOFEN PO Take 10 mg by mouth 3 times daily as needed for muscle spasms  Yes Unknown, Entered By History   levothyroxine (SYNTHROID/LEVOTHROID) 50 MCG tablet TAKE ONE TABLET BY MOUTH EVERY DAY 12/18/2017 at Unknown time Yes Marivel Barcenas APRN CNP   tacrolimus (GENERIC EQUIVALENT) 1 MG capsule Take 2 capsules (2 mg) by mouth every evening (total dose 3.5 mg twice daily)  Patient taking differently: Take 2 mg by mouth 2 times daily  12/18/2017 at Unknown time Yes Janki Marin MD   diphenoxylate-atropine (LOMOTIL) 2.5-0.025 MG per tablet Take 1 tablet by mouth 4 times daily as needed for diarrhea  Yes Brittani Ortiz MD   meclizine (ANTIVERT) 12.5 MG tablet Take 1 tablet (12.5 mg) by mouth 3 times daily as needed for dizziness  Yes Brittani Ortiz MD   gabapentin (NEURONTIN) 300 MG capsule Take 3 capsules (900 mg) by mouth 2 times daily For pain 12/18/2017 at Unknown time Yes Marivel Barcenas APRN CNP   cloNIDine (CATAPRES) 0.1 MG tablet TAKE ONE TABLET BY MOUTH TWICE A DAY 12/18/2017 at Unknown time Yes Marivel Barcenas APRN CNP   Cholecalciferol (VITAMIN D) 2000 UNITS tablet Take 4,000 Units by mouth 2 times daily 12/18/2017 at Unknown time Yes    predniSONE (DELTASONE) 5 MG tablet Take 1 tablet  (5 mg) by mouth daily 12/18/2017 at Unknown time Yes Marivel Barcenas APRN CNP   insulin glargine (LANTUS SOLOSTAR) 100 UNIT/ML injection Inject 24 units under the skin daily.  Patient taking differently: Inject 24 Units Subcutaneous At Bedtime Inject 24 units under the skin daily. 12/18/2017 at Unknown time Yes Marivel Barcenas APRN CNP   insulin aspart (NOVOLOG FLEXPEN) 100 UNIT/ML injection Inject 10 units under the skin 2 times daily before meals as directed. Add sliding scale as needed. Avg use 25 units per day  Patient taking differently: Inject 10 units under the skin 2 times daily before meals as directed. Add sliding scale as needed.  2 units for every 50 over 201.  Avg use 25 units per day Past Week at Unknown time Yes Marivel Barcenas APRN CNP   lidocaine (XYLOCAINE) 2 % topical gel Apply topically as needed for moderate pain 10 ML every 3 weeks  Yes Marivel Barcenas APRN CNP   labetalol (NORMODYNE) 300 MG tablet Take 150 mg by mouth 2 times daily Take 1/2 tablet (150mg) =300mg. Hold for systolic BP less than 120. 12/18/2017 at Unknown time Yes    loratadine (CLARITIN) 10 MG tablet Take 1 tablet (10 mg) by mouth daily 12/18/2017 at Unknown time Yes Marivel Barcenas APRN CNP   pravastatin (PRAVACHOL) 10 MG tablet TAKE ONE TABLET BY MOUTH EVERY DAY 12/18/2017 at Unknown time Yes Marivel Barcenas APRN CNP   OXcarbazepine (TRILEPTAL) 300 MG tablet Take 1 tablet (300 mg) by mouth 2 times daily For pain 12/18/2017 at Unknown time Yes    aspirin 81 MG chewable tablet Take 1 tablet (81 mg) by mouth daily Starting 1 week after your kidney biopsy 12/18/2017 at Unknown time Yes Christiano Webber MD   cinacalcet (SENSIPAR) 30 MG tablet Take 1 tablet (30 mg) by mouth daily 12/18/2017 at Unknown time Yes Christiano Webber MD   cyanocobalamin (VITAMIN B12) 1000 MCG/ML injection Inject 1 mL (1,000 mcg) into the muscle every 30 days 11/19/2017 Yes  Marivel Barcenas APRN CNP   TraMADol HCl 50 MG TBDP Take 50 mg by mouth 3 times daily as needed  Yes Marivel Barcenas APRN CNP   ondansetron (ZOFRAN ODT) 4 MG disintegrating tablet Take 1-2 tablets (4-8 mg) by mouth every 8 hours as needed for nausea  Yes Marivel Barcenas APRN CNP   simethicone (MYLICON) 125 MG CHEW Take 1 tablet (125 mg) by mouth as needed for intestinal gas 12/18/2017 at Unknown time Yes    Lactobacillus Rhamnosus, GG, (CULTURELL) capsule Take 1 capsule by mouth 2 times daily 12/18/2017 at Unknown time Yes She Siddiqui MD   Cranberry 400 MG TABS Take 400 mg by mouth 2 times daily 12/18/2017 at Unknown time Yes Unknown, Entered By History   famotidine (PEPCID) 20 MG tablet Take 1 tablet (20 mg) by mouth 2 times daily 12/18/2017 at Unknown time Yes Marivel Barcenas APRN CNP   ACETAMINOPHEN PO Take 325-650 mg by mouth every 4 hours as needed.  Yes Unknown, Entered By History   order for DME High back guerrero wheel chair  MICHELLE 99  DX: morbid obesity, generalized weakness, physical deconditioning, chronic vertigo   Marivel Barcenas APRN CNP   blood glucose monitoring (ACCU-CHEK COMPACT PLUS) test strip Use to test blood sugar 3 times daily or as directed.  Ok to substitute alternative if insurance prefers.      order for DME Equipment being ordered: Silver Impregnated catheters  HX of frequent UTI   Marivel Barcenas APRN CNP   order for DME Equipment being ordered: Ahuja FX CPAP interface (nasal pillows), size XS.   Lorenzo Choudhury MD   insulin pen needle 30G X 8 MM Use as directed with Marivel Westfall APRN CNP   order for DME Equipment being ordered: Blue Chux  Please send to [facility] medical   Marivel Barcenas APRN CNP   ORDER FOR DME Equipment being ordered: Johnson Catheters - 18 french   Marivel Barcenas APRN CNP

## 2017-12-21 LAB
ANION GAP SERPL CALCULATED.3IONS-SCNC: 8 MMOL/L (ref 3–14)
BUN SERPL-MCNC: 30 MG/DL (ref 7–30)
CALCIUM SERPL-MCNC: 8.1 MG/DL (ref 8.5–10.1)
CHLORIDE SERPL-SCNC: 106 MMOL/L (ref 94–109)
CO2 SERPL-SCNC: 26 MMOL/L (ref 20–32)
CREAT SERPL-MCNC: 1.11 MG/DL (ref 0.52–1.04)
ERYTHROCYTE [DISTWIDTH] IN BLOOD BY AUTOMATED COUNT: 14.6 % (ref 10–15)
GFR SERPL CREATININE-BSD FRML MDRD: 48 ML/MIN/1.7M2
GLUCOSE BLDC GLUCOMTR-MCNC: 116 MG/DL (ref 70–99)
GLUCOSE BLDC GLUCOMTR-MCNC: 81 MG/DL (ref 70–99)
GLUCOSE BLDC GLUCOMTR-MCNC: 83 MG/DL (ref 70–99)
GLUCOSE BLDC GLUCOMTR-MCNC: 84 MG/DL (ref 70–99)
GLUCOSE SERPL-MCNC: 83 MG/DL (ref 70–99)
HCT VFR BLD AUTO: 37.1 % (ref 35–47)
HGB BLD-MCNC: 11.8 G/DL (ref 11.7–15.7)
MCH RBC QN AUTO: 30.3 PG (ref 26.5–33)
MCHC RBC AUTO-ENTMCNC: 31.8 G/DL (ref 31.5–36.5)
MCV RBC AUTO: 95 FL (ref 78–100)
PLATELET # BLD AUTO: 127 10E9/L (ref 150–450)
POTASSIUM SERPL-SCNC: 4.1 MMOL/L (ref 3.4–5.3)
RBC # BLD AUTO: 3.89 10E12/L (ref 3.8–5.2)
SODIUM SERPL-SCNC: 140 MMOL/L (ref 133–144)
WBC # BLD AUTO: 8.9 10E9/L (ref 4–11)

## 2017-12-21 PROCEDURE — 36415 COLL VENOUS BLD VENIPUNCTURE: CPT | Performed by: INTERNAL MEDICINE

## 2017-12-21 PROCEDURE — 80048 BASIC METABOLIC PNL TOTAL CA: CPT | Performed by: INTERNAL MEDICINE

## 2017-12-21 PROCEDURE — 25000132 ZZH RX MED GY IP 250 OP 250 PS 637: Mod: GY | Performed by: INTERNAL MEDICINE

## 2017-12-21 PROCEDURE — 40000275 ZZH STATISTIC RCP TIME EA 10 MIN

## 2017-12-21 PROCEDURE — 25000128 H RX IP 250 OP 636: Performed by: PHYSICIAN ASSISTANT

## 2017-12-21 PROCEDURE — 40000061 ZZH STATISTIC EEG TIME EA 10 MIN

## 2017-12-21 PROCEDURE — 94003 VENT MGMT INPAT SUBQ DAY: CPT

## 2017-12-21 PROCEDURE — 85027 COMPLETE CBC AUTOMATED: CPT | Performed by: INTERNAL MEDICINE

## 2017-12-21 PROCEDURE — 99291 CRITICAL CARE FIRST HOUR: CPT | Performed by: INTERNAL MEDICINE

## 2017-12-21 PROCEDURE — 40000008 ZZH STATISTIC AIRWAY CARE

## 2017-12-21 PROCEDURE — 99211 OFF/OP EST MAY X REQ PHY/QHP: CPT

## 2017-12-21 PROCEDURE — 20000003 ZZH R&B ICU

## 2017-12-21 PROCEDURE — 99207 ZZC APP CREDIT; MD BILLING SHARED VISIT: CPT | Performed by: PHYSICIAN ASSISTANT

## 2017-12-21 PROCEDURE — A9270 NON-COVERED ITEM OR SERVICE: HCPCS | Mod: GY | Performed by: INTERNAL MEDICINE

## 2017-12-21 PROCEDURE — 00000146 ZZHCL STATISTIC GLUCOSE BY METER IP

## 2017-12-21 PROCEDURE — A9270 NON-COVERED ITEM OR SERVICE: HCPCS | Mod: GY | Performed by: PHYSICIAN ASSISTANT

## 2017-12-21 PROCEDURE — 25000132 ZZH RX MED GY IP 250 OP 250 PS 637: Mod: GY | Performed by: PHYSICIAN ASSISTANT

## 2017-12-21 PROCEDURE — 25000131 ZZH RX MED GY IP 250 OP 636 PS 637: Mod: GY | Performed by: INTERNAL MEDICINE

## 2017-12-21 PROCEDURE — 25000128 H RX IP 250 OP 636: Performed by: INTERNAL MEDICINE

## 2017-12-21 PROCEDURE — 95813 EEG EXTND MNTR 61-119 MIN: CPT

## 2017-12-21 PROCEDURE — 25000125 ZZHC RX 250: Performed by: INTERNAL MEDICINE

## 2017-12-21 RX ORDER — GABAPENTIN 300 MG/1
300 CAPSULE ORAL 2 TIMES DAILY
Status: DISCONTINUED | OUTPATIENT
Start: 2017-12-21 | End: 2017-12-24

## 2017-12-21 RX ORDER — TACROLIMUS 1 MG/1
2 CAPSULE ORAL
Status: DISCONTINUED | OUTPATIENT
Start: 2017-12-21 | End: 2017-12-22

## 2017-12-21 RX ORDER — HALOPERIDOL 5 MG/ML
5 INJECTION INTRAMUSCULAR EVERY 6 HOURS PRN
Status: DISCONTINUED | OUTPATIENT
Start: 2017-12-21 | End: 2017-12-22

## 2017-12-21 RX ORDER — HYDRALAZINE HYDROCHLORIDE 20 MG/ML
10 INJECTION INTRAMUSCULAR; INTRAVENOUS EVERY 4 HOURS PRN
Status: DISCONTINUED | OUTPATIENT
Start: 2017-12-21 | End: 2017-12-25 | Stop reason: HOSPADM

## 2017-12-21 RX ADMIN — ASPIRIN 81 MG 81 MG: 81 TABLET ORAL at 08:25

## 2017-12-21 RX ADMIN — OXCARBAZEPINE 300 MG: 300 TABLET, FILM COATED ORAL at 08:25

## 2017-12-21 RX ADMIN — MEROPENEM AND SODIUM CHLORIDE 1 G: 1 INJECTION, SOLUTION INTRAVENOUS at 19:38

## 2017-12-21 RX ADMIN — CINACALCET HYDROCHLORIDE 30 MG: 30 TABLET, COATED ORAL at 08:24

## 2017-12-21 RX ADMIN — PRAVASTATIN SODIUM 10 MG: 10 TABLET ORAL at 08:25

## 2017-12-21 RX ADMIN — FAMOTIDINE 20 MG: 20 TABLET ORAL at 08:24

## 2017-12-21 RX ADMIN — LABETALOL HYDROCHLORIDE 10 MG: 5 INJECTION, SOLUTION INTRAVENOUS at 08:03

## 2017-12-21 RX ADMIN — PREDNISONE 5 MG: 5 TABLET ORAL at 08:24

## 2017-12-21 RX ADMIN — BACLOFEN 10 MG: 10 TABLET ORAL at 04:21

## 2017-12-21 RX ADMIN — HYDRALAZINE HYDROCHLORIDE 10 MG: 20 INJECTION INTRAMUSCULAR; INTRAVENOUS at 17:23

## 2017-12-21 RX ADMIN — Medication 150 MG: at 12:56

## 2017-12-21 RX ADMIN — TACROLIMUS 2 MG: 5 CAPSULE ORAL at 09:37

## 2017-12-21 RX ADMIN — MEROPENEM AND SODIUM CHLORIDE 1 G: 1 INJECTION, SOLUTION INTRAVENOUS at 02:41

## 2017-12-21 RX ADMIN — SODIUM CHLORIDE, POTASSIUM CHLORIDE, SODIUM LACTATE AND CALCIUM CHLORIDE: 600; 310; 30; 20 INJECTION, SOLUTION INTRAVENOUS at 09:10

## 2017-12-21 RX ADMIN — MICONAZOLE NITRATE: 2 POWDER TOPICAL at 08:25

## 2017-12-21 RX ADMIN — LABETALOL HYDROCHLORIDE 10 MG: 5 INJECTION, SOLUTION INTRAVENOUS at 06:20

## 2017-12-21 RX ADMIN — LEVOTHYROXINE SODIUM 50 MCG: 50 TABLET ORAL at 08:24

## 2017-12-21 RX ADMIN — CHLORHEXIDINE GLUCONATE 15 ML: 1.2 RINSE ORAL at 08:09

## 2017-12-21 RX ADMIN — LABETALOL HYDROCHLORIDE 20 MG: 5 INJECTION, SOLUTION INTRAVENOUS at 14:42

## 2017-12-21 RX ADMIN — MEROPENEM AND SODIUM CHLORIDE 1 G: 1 INJECTION, SOLUTION INTRAVENOUS at 11:10

## 2017-12-21 RX ADMIN — CLONIDINE HYDROCHLORIDE 0.1 MG: 0.1 TABLET ORAL at 08:24

## 2017-12-21 RX ADMIN — GABAPENTIN 300 MG: 250 SUSPENSION ORAL at 08:39

## 2017-12-21 NOTE — PROGRESS NOTES
Patient was extubated at 1705 and placed on a 40% aerosol mask with SpO2 100%. No stridor present.  12/21/2017  Pily Pagan RRT

## 2017-12-21 NOTE — PLAN OF CARE
Problem: Patient Care Overview  Goal: Plan of Care/Patient Progress Review  OT and PT: Orders rec'd. Patient intubated, not sedated but not following commands. Holding therapy evals today.

## 2017-12-21 NOTE — PROGRESS NOTES
1 hour video  READING: SEBASTIAN  ORDERING: SEBASTIAN  EEG DONE BEDSIDE, NO SEDATION PT COOPERATIVE

## 2017-12-21 NOTE — PROGRESS NOTES
Off fentanyl and propofol since this morning. Patient will flutter eyes to voice but not open them/no tracking. Withdrawals on all 4 extremities to pain.  updated throughout the day. Frequent turn/reposition. Failed pressure support trial within a few minutes 2x. Continue to montior.

## 2017-12-21 NOTE — PROGRESS NOTES
Date of Admission: 12/19/17  Date of Intubation (most recent): 12/19/17  Reason for Mechanical Ventilation: airway protection  Number of Days on Mechanical Ventilation:2  Met Criteria for Pressure Support Trial:  Length of Pressure Support Trial:  Reason for Stopping Pressure Support Trial:  Reason for No Pressure Support Trial:  Ventilation Mode: CMV/AC  FiO2 (%): 40 %  Rate Set (breaths/minute): 14 breaths/min  Tidal Volume Set (mL): 450 mL  PEEP (cm H2O): 5 cmH2O  Oxygen Concentration (%): 40 %  Resp: 14    Recent Labs  Lab 12/19/17  1745   PH 7.36   PCO2 48*   PO2 161*   HCO3 27   plan: continue full vent support overnight, assess for weaning readiness tomorrow

## 2017-12-21 NOTE — PROGRESS NOTES
Nephrology Progress Note          Assessment and Plan:       UTI with altered mental status; now improving LOC.  Transplant renal function remains good.  Expect successful weaning off vent support.  No changes in immunosuppressive combination needed.  Agree with resuming Labetalol and stopping Vanco.       * No resolved hospital problems. *               Interval History:   Now awake on vent.  VS ok except higher BP.  Low grade fever.  Improving UO.  Wt up ~2kg with current IV fluid.  Meds and labs reviewed.  Blood and urine cultures remain negative.  Chemistries ok; Cr 1.1, lytes nl.  CBC stable; WBC again nl.                Medications:       labetalol  150 mg Oral BID     tacrolimus  2 mg Oral BID     predniSONE  5 mg Oral Daily     pravastatin  10 mg Oral Daily     OXcarbazepine  300 mg Oral BID     levothyroxine  50 mcg Oral Daily     aspirin  81 mg Oral Daily     cinacalcet  30 mg Oral Daily     cloNIDine  0.1 mg Oral BID     chlorhexidine  15 mL Mouth/Throat Q12H     enoxaparin  40 mg Subcutaneous Q24H     miconazole   Topical BID     meropenem  1 g Intravenous Q8H     gabapentin  300 mg Oral BID     insulin aspart  1-6 Units Subcutaneous Q4H     famotidine  20 mg Oral BID       propofol (DIPRIVAN) infusion Stopped (12/20/17 0950)     lactated ringers 75 mL/hr at 12/20/17 1654                    Physical Exam:       Vital Sign Ranges  Temp:  [98.5  F (36.9  C)-99.7  F (37.6  C)] 99.6  F (37.6  C)  Heart Rate:  [69-80] 80  Resp:  [8-20] 14  BP: (128-197)/(51-91) 197/91  FiO2 (%):  [40 %] 40 %  SpO2:  [100 %] 100 %    Weight, current:  102.5 kg (actual weight)  Weight change: 2.7 kg (5 lb 15.2 oz)    I/O last 3 completed shifts:  In: 2786.81 [I.V.:2086.81; NG/GT:200; IV Piggyback:500]  Out: 850 [Urine:850]    Physical Exam:   General:  Patient anxious on vent; otherwise seems comfortable, in no apparent distress.  Awake and somewhat interactive.  Neck:  Supple, no JVD.  Lungs:  Clear to auscultation  bilaterally.  Cardiac:  Regular rate and rhythm, no murmurs, rub, or gallops.  Abdomen:  Soft, nontender, physiologic sounds.  Extremities:  Without edema.  2+ pulses.  Skin:  Warm, dry.  Neurologic:  No focal deficits.             Data:        Lab Results   Component Value Date     12/21/2017    Lab Results   Component Value Date    CHLORIDE 106 12/21/2017    Lab Results   Component Value Date    BUN 30 12/21/2017      Lab Results   Component Value Date    POTASSIUM 4.1 12/21/2017    Lab Results   Component Value Date    CO2 26 12/21/2017    Lab Results   Component Value Date    CR 1.11 (H) 12/21/2017        Lab Results   Component Value Date     12/21/2017     12/20/2017     12/19/2017     Lab Results   Component Value Date    POTASSIUM 4.1 12/21/2017    POTASSIUM 3.7 12/20/2017    POTASSIUM 4.7 12/19/2017     Lab Results   Component Value Date    CHLORIDE 106 12/21/2017    CHLORIDE 106 12/20/2017    CHLORIDE 104 12/19/2017     Lab Results   Component Value Date    CO2 26 12/21/2017    CO2 25 12/20/2017    CO2 30 12/19/2017     Lab Results   Component Value Date    CR 1.11 (H) 12/21/2017    CR 1.13 (H) 12/20/2017    CR 1.21 (H) 12/19/2017     Lab Results   Component Value Date    BUN 30 12/21/2017    BUN 33 (H) 12/20/2017    BUN 38 (H) 12/19/2017     Lab Results   Component Value Date    HGB 11.8 12/21/2017    HGB 11.4 (L) 12/20/2017    HGB 11.6 (L) 12/19/2017     Lab Results   Component Value Date    PH 7.36 12/19/2017    PO2 161 (H) 12/19/2017    PCO2 48 (H) 12/19/2017    HCO3 27 12/19/2017    FLAKITO 5.0 01/10/2016             Robin Mejia MD  Nephrology; [employer]  [phone]

## 2017-12-21 NOTE — PROGRESS NOTES
[facility]    Critical Care Service  Progress Note    Date of Service (when I saw the patient): 12/21/2017     Main Plans for Today   - 1 hour EEG  - add home labetalol  - attempt PST  - D/C vanc      Assessment & Plan   Ms. Gem Jade is a 72 yof with a history of neurogenic bladder with chronic indwelling rico, recurrent UTIs, and kidney transplant (1999) who is admitted on 12/19 with sepsis from urinary source, intubated in the ED for altered mental status.      Neuro:  1. Analgesia and Sedation  2. Polyneuropathy 2/2 DM; Chronic Pain  3. Acute Encephalopathy -- ?2/2 acute infection  -Cont home Baclofen prn dosing  -Dose reduced Gabapentin to 300 mg BID (home dosing 900 mg BID)  -Cont. Home Trileptal 300 mg BID  - patient with history of intermittent episodes of altered mental status with UTIs  - given persistent encephalopathy off of sedation, will get 1 hour EEG today   - ammonia normal       Respiratory  1. Acute Hypoxic Respiratory failure s/p intubation for airway protection  - Intubated, mechanically ventilated  - extubation limited by mental status, patient becomes apneic on PS      CV:  1. Sepsis from Urinary Source   2. Hypertension, labile BPs   - MAP goal > 65  - Cont home clonidine 0.1 mg BID  - add home lisinopril 125 BID and PRN labetalol if HR allows   - Cont. Home Pravastatin      Renal:  1. s/p Kidney txplt in 1999  2.Crt currently lower than baseline  3. Oliguria   -Renal consult placed given kidney transplant hx  -Cont. Tacrolimus 2 mg BID; nephrology will follow tacro trough   -Continue Prednisone 5 mg daily   -Patient is normotensive to hypertensive -- will hold off on stress dose steroids at this time  -Hold Cinacalcet while NPO  -Monitor crt; renally dose meds; avoid nephrotoxins as able  - UOP is marginal at times, will continue to monitor      Heme:  1. H/o DVT/PE  2. Anemia -- hgb stable  -Cont. ASA 81 mg  -monitor hgb; transfuse for < 7      ID:  1. 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 Type 2 Diabetes w/ gastroparesis, retinopathy, and polyneuropathy  2. Hypothyroidism  -Continue home Levothyroxine  -Hold home Lantus while NPO  -SSI; glucose management per ICU protocol      FEN:  - will increase mIVF to 75cc/hr   -Monitor Electrolytes  -Nutrition c/s in if remains intubated  -Senna/Colace for Bowel regimen      Prophy:  GI:  H2 Blocker   DVT:  LMWH       ICU Cares:  Restrain needed: Yes   Lines needed: Yes; IVs, Johnson  Code: Full  Dispo: ICU Status    Barb Elaine    Time Spent on this Encounter   Billing:  I spent 45 minutes bedside and on the inpatient unit today managing the critical care of Gem Jade in relation to the issues listed in this note.    Interval History   Course reviewed. Patient with no acute events overnight. Has remained somnolent despite being off of sedation. Intermittently hypertensive. Cannot assess ROS as patient intubated and sedated.     Physical Exam   Temp: 99.6  F (37.6  C) Temp src: Axillary Temp  Min: 97.5  F (36.4  C)  Max: 99.7  F (37.6  C) BP: 195/83   Heart Rate: 70 Resp: 14 SpO2: 100 % O2 Device: Mechanical Ventilator    Vitals:    12/19/17 2000 12/20/17 0400 12/21/17 0400   Weight: 100.4 kg (221 lb 5.5 oz) 100.4 kg (221 lb 5.5 oz) 102.5 kg (225 lb 15.5 oz)     I/O last 3 completed shifts:  In: 2786.81 [I.V.:2086.81; NG/GT:200; IV Piggyback:500]  Out: 850 [Urine:850]    GEN: Resting in bed, sedated on exam   EYES: PERRL, Anicteric sclera.   HEENT:  Normocephalic, atraumatic, trachea midline, ETT secure  CV: RRR, no murmurs  PULM/CHEST: Diminished breath sounds in the bases,no wheezes or rales    GI:  bowel sounds +, soft, non-tender  EXTREMITIES: 1+ peripheral edema  NEURO: PERRL, does  not follow commands   SKIN: warm and dry   ECG- NSR    Medications     propofol (DIPRIVAN) infusion Stopped (12/20/17 0950)     lactated ringers 75 mL/hr at 12/20/17 1654       labetalol  150 mg Oral BID     tacrolimus  2 mg Oral BID     predniSONE  5 mg Oral Daily     pravastatin  10 mg Oral Daily     OXcarbazepine  300 mg Oral BID     levothyroxine  50 mcg Oral Daily     aspirin  81 mg Oral Daily     cinacalcet  30 mg Oral Daily     cloNIDine  0.1 mg Oral BID     chlorhexidine  15 mL Mouth/Throat Q12H     enoxaparin  40 mg Subcutaneous Q24H     miconazole   Topical BID     meropenem  1 g Intravenous Q8H     gabapentin  300 mg Oral BID     insulin aspart  1-6 Units Subcutaneous Q4H     famotidine  20 mg Oral BID       Data     Recent Labs  Lab 12/21/17  0452 12/20/17  0525 12/19/17  1602   WBC 8.9 6.0 6.9   HGB 11.8 11.4* 11.6*   MCV 95 93 95   * 135* 156    139 138   POTASSIUM 4.1 3.7 4.7   CHLORIDE 106 106 104   CO2 26 25 30   BUN 30 33* 38*   CR 1.11* 1.13* 1.21*   ANIONGAP 8 8 4   ROGER 8.1* 8.1* 8.5   GLC 83 123* 158*   ALBUMIN  --   --  3.0*   PROTTOTAL  --   --  7.7   BILITOTAL  --   --  0.4   ALKPHOS  --   --  83   ALT  --   --  21   AST  --   --  16     No results found for this or any previous visit (from the past 24 hour(s)).            Critical Care Services Progress Note:     Gem Jade remains critically ill with mental status changes and  urosepsis     I personally examined and evaluated the patient today.   The patient s prognosis today is improving  I have evaluated all laboratory values and imaging studies from the past 24 hours.  Key findings and decisions made today included discontinue all sedation. Spontaneous breathing trial   I personally managed the ventilator, sedation, pain control and analgesia, metabolic abnormalities and antibiotic therapy.   Consults ongoing and ordered are ID  Procedures that will happen today are: mechanical ventilation  All treatments were placed at  my direction.  I formulated today s plan with the house staff team or resident(s) and agree with the findings and plan in the associated note.       The above plans and care have been discussed with family and all questions and concerns were addressed.     I spent a total of 35 minutes in addition to EDUARDO time (excluding procedure time) personally providing and directing critical care services at the bedside and on the critical care unit for Gem Jade.        Boris Christianson

## 2017-12-21 NOTE — PHARMACY-VANCOMYCIN DOSING SERVICE
Pharmacy Vancomycin Note  Date of Service 2017  Patient's  1945   72 year old, female    Indication: Urinary Tract Infection  Goal Trough Level: 15-20 mg/L  Day of Therapy: 2  Current Vancomycin regimen:  Intermittent dosing    Current estimated CrCl = Estimated Creatinine Clearance: 54.6 mL/min (based on Cr of 1.13).    Creatinine for last 3 days  2017:  4:02 PM Creatinine 1.21 mg/dL  2017:  5:25 AM Creatinine 1.13 mg/dL    Recent Vancomycin Levels (past 3 days)  2017:  6:35 PM Vancomycin Level 20.1 mg/L    Vancomycin IV Administrations (past 72 hours)                   vancomycin (VANCOCIN) 2,500 mg in NaCl 0.9 % 500 mL intermittent infusion (mg) 2,500 mg New Bag 17 181                Nephrotoxins and other renal medications (Future)    Start     Dose/Rate Route Frequency Ordered Stop    17 0000  vancomycin (VANCOCIN) 1,750 mg in NaCl 0.9 % 500 mL intermittent infusion      1,750 mg  over 2 Hours Intravenous ONCE 17 2300  tacrolimus (GENERIC EQUIVALENT) suspension 2 mg      2 mg Oral 2 TIMES DAILY 17 210  vancomycin place smith - receiving intermittent dosing      1 each Does not apply SEE ADMIN INSTRUCTIONS 17               Contrast Orders - past 72 hours     None          Interpretation of levels and current regimen:  Trough level is slightly Supratherapeutic ater 1 dose 2500mg    Has serum creatinine changed > 50% in last 72 hours: No    Urine output:  Had drop in UO this morning, watching closely    Renal Function: monitoring closely    Plan:  1.  Redose Vanco 1750mg IV x1 tonight  2.  Pharmacy will check trough levels as appropriate in 1-3 Days.        Eunice Henderson        .

## 2017-12-21 NOTE — PROGRESS NOTES
[facility] Nurse Inpatient Wound Assessment     Initial Assessment of wound(s) on pt's:   R armpit, Groin, Coccyx        Data:   Patient History:      Ms. Gem Jade is a 72 yof with a history of neurogenic bladder with chronic indwelling rico, recurrent UTIs, and kidney transplant () who is admitted on  with sepsis from urinary source, intubated in the ED for altered mental status.      Albino Assessment and sub scores: Albino Score  Av.2  Min: 10  Max: 16         Positioning: Pillows    Mattress:  Atmos air      Moisture Management: rico for [facility]IC / incontinence protocol for [facility]           Current Diet / Nutrition:   NPO    Labs:   Recent Labs   Lab Test  17   0452   17   1602   17   1652   17   1243   ALBUMIN   --    --   3.0*   < >  3.1*   < >  3.0*   HGB  11.8   < >  11.6*   < >  12.7   < >  10.9*   INR   --    --    --    --   1.09   < >  1.01   WBC  8.9   < >  6.9   < >  13.5*   < >  5.0   A1C   --    --   6.7*   < >   --    < >   --    CRP   --    --    --    --    --    --   7.5    < > = values in this interval not displayed.         Wound Assessment (location):  Wound Assessment (location):   Groin    Wound History:  Pt is obese with deep skin folds and moist skin, Pt pink moist denuded tissue, no open areas epidermis intact, yeast smell.    Wound Assessment (location):   Coccyx    Wound History:  Pt is obese with deep skin folds and moist skin, Pt has red rashy satellite lesions underneath with superficial scattered excoriations. Epidermis is intact and red blanchable. 10cm x 10cm,             Intervention:     Patient's chart evaluated.      Wound(s) were assessed    Orders  Written    Supplies  Reviewed,    Discussed plan of care with Patient and Nurse          Assessment:       Pt has moisture associated skin breakdown with yeast build up and rash. Skin has no active signs of infection. Will perform daily hygiene with particular focus to   groin folds and Coccyx. Will cleanse with jermaine cleanse, dust with miconazole powder and leave open to air. May apply interdry to wick moisture away from body. Use light layers and keep minimal amt of bedding underneath pt. Practice strict PIPassistance with all . If pt remains in hospital she may benefit pulsate air mattress to promote dry skin and pressure relief.         Plan:     Nursing to notify the Provider(s) and re-consult the WOC Nurse if wound(s) deteriorate(s) or if the wound care plan needs reevaluation.    Plan of care for wound located on, groin and coccyx:     1. Cleanse with Jermaine cleanse. Spray onto skin and gently wipe away    2. Dust rash areas with Miconazole poweder    3. Leave skin exposed to air as much as possible. Tuck Interdry AG into deep crevices and leave a couple inches exposed to air to wick moisture away from body.     4. Perform skin care 2xdaily  .    WOC Nurse will return: Weekly and PRN     Face to face time: 20 Minutes

## 2017-12-21 NOTE — PROGRESS NOTES
[facility] ICU RESPIRATORY NOTE  Date of Admission: 12/19/17  Date of Intubation (most recent): 12/19/17  Reason for Mechanical Ventilation: airway protection  Number of Days on Mechanical Ventilation:1  Met Criteria for Pressure Support Trial:YES   Length of Pressure Support Trial: 15 minute   Reason for Stopping Pressure Support Trial: Decreased RR.   Significant Events Today: attempted PS 5/5, 10/5, 15/5 all pt did not tolerated.    ABG Results: none today   ETT appearance on chest x-ray: IN good position.     Plan: Pt will remains intubated and re assess tomorrow for weaning.    Ty Tilley RT.

## 2017-12-21 NOTE — CONSULTS
NEPHROLOGY CONSULTATION      PHYSICIAN ASSISTANT:  Babr Elaine PA-C        PRIMARY NEPHROLOGIST:  Ross Elizabeth MD      Gem Jade is a 72-year-old woman whom we were asked to see by the intensivist team following admission yesterday with signs and symptoms of pyelonephritis complicated by depressed mentation.  The patient has required intubation and assisted ventilation because of her obtunded state and concern for airway protection.  She has been placed empirically on vancomycin and meropenem for her suspected UTI.  The patient has a history of end-stage renal disease and was successfully transplanted in 1999.  Her kidney came from a living-related donor.  The patient has maintained satisfactory renal function throughout the last 18 years and is followed for post-transplant care by Dr. Elizabeth at the [facility].  She has a history of frequent urinary tract infection, oftentimes complicated by depressed mentation.  She has a neurogenic bladder and chronically indwelling Johnson catheter.      Our group has seen Ms. Jade routinely during admissions to [facility].  The most recent of these before the current admission was only about a month ago.  She had a urinary tract infection and altered mental status at that time but made a good recovery.  She was seen by Dr. Carson from our group.  Her renal function remains stable.      Ms. Jade is on an immunosuppressive regimen of tacrolimus and prednisone.  Her tacrolimus  dose is 2 mg twice daily.  Her goal tacrolimus blood levels, according to Dr. Elizabeth's most recent note in April of this year, are 4-6.  She had a trough tacrolimus blood level of 3.8 nine days ago and 4.2 at the end of November.  No changes were made in her tacrolimus dosing at that time.      Ms. Jade's baseline renal function indicates stage III CKD, creatinine levels have generally been about 1.3-1.6.  On 12/11, BUN and creatinine were 48 and 1.35 and electrolytes normal.  The patient has had  somewhat improved chemistries since admission here.  Yesterday's electrolytes were normal and BUN and creatinine 38 and 1.21.  After overnight IV fluids, BUN and creatinine have improved further to 33 and 1.13.  Blood and urine cultures obtained on admission are negative thus far.  The patient's urinalysis showed marked pyuria and microhematuria.  Dipstick proteinuria is 3+.      The patient's other medical problems include insulin-dependent diabetes, hypertension, peripheral neuropathy, hyperlipidemia, and persistent secondary hyperparathyroidism.  She is hypothyroid on levothyroxine replacement therapy.  Her medication list is reviewed.  She is on an antihypertensive regimen that includes clonidine and labetalol.  Her clonidine has been continued and labetalol withheld.  She is on only short acting  insulin on a sliding scale.  Her prior to admission insulin regimen included Lantus 24 units per day.      An orogastric feeding tube has been placed, and there are plans for enteral nutritional support while on ventilator.  This has not yet been started.  She has been on IV fluids, currently lactated Ringer's at 75 mL per hour.  Her urine output has been satisfactory, about 1 liter per 24 hours.  Her current I&O balance is positive 1 liter.      Other labs include normal liver function tests.  Her albumin is depressed at 30.  She has a normal ammonia level of 17, lactic acid level is not elevated, 0.6.  TSH is in the euthyroid range at 1.34.  Glucoses have been stable.  Hemoglobin is about 11.5.  White blood cell count with differential is normal.  Her platelet count was satisfactory at about 150,000.  She has a negative drug screen.  Her chest x-ray shows mild cardiomegaly but clear lungs.  She had a head CT scan which shows no acute findings of concern.  There are atrophic changes and white matter abnormalities consistent with small vessel ischemic disease.  The scan is unchanged from last month.      Ms. Jade has  been hemodynamically stable overnight.  She has had satisfactory oxygen saturation levels with an FIO2 of 0.4.  She was earlier sedated with propofol, but this has been weaned off.  She continues not particularly responsive to verbal cues.      PHYSICAL EXAMINATION:   GENERAL:  On exam now, the patient seems comfortable on the ventilator.  She is afebrile.   VITAL SIGNS:  Pulse is regular, sinus rhythm, rate in the 70s, blood pressure is about 140/60, respirations are 14, oxygen saturation is 100%.   SKIN:  Shows good color and turgor.  There are no suspicious skin lesions.  There is no palpable lymphadenopathy.   HEENT AND NECK:  Exams seem unremarkable.   LUNGS:  Clear.   HEART:  Normal, without murmur, rub or gallop.  Peripheral pulses are strong and symmetric.   ABDOMEN:  Soft.  There is no obvious tenderness.  Bowel sounds are normally active.  No organomegaly or masses are appreciated.  The patient's renal transplant in the iliac fossa on the right is not easily palpated.     EXTREMITIES:  Demonstrate no significant edema.   NEUROLOGIC:  Not possible because of her sedated state.  She does not have any response to verbal cues but does have withdrawal from aversive stimuli that suggests no focal neurologic deficit.      ASSESSMENT:  Ms. Jade's renal transplant status appears to be good.  As mentioned above, her BUN and creatinine are better during this admission than recent values.  I do not recommend any changes in her immunosuppressive therapy.  In particular, it does not appear as though she needs an increase in corticosteroid support because of stress.  Her usual dose is 5 mg daily, which has been continued.  I would not change her tacrolimus dose either despite the recent slightly lower than ideal blood level.  I think that it would be prudent during the course of this hospitalization to repeat a trough level on the current therapy.      I will continue to monitor her post-transplant status during this  hospitalization.  Thanks for the opportunity to assist again in this patient's care.         LEATHA MESA MD             D: 2017 18:24   T: 2017 23:20   MT: LQ      Name:     MARYJANE SHAVER   MRN:      [id number]        Account:       [id number]   :      1945           Consult Date:  2017      Document: [id number]       cc: Ross CRUZ, CNP

## 2017-12-22 ENCOUNTER — APPOINTMENT (OUTPATIENT)
Dept: PHYSICAL THERAPY | Facility: CLINIC | Age: 72
DRG: 871 | End: 2017-12-22
Attending: INTERNAL MEDICINE
Payer: MEDICARE

## 2017-12-22 LAB
BACTERIA SPEC CULT: NORMAL
GLUCOSE BLDC GLUCOMTR-MCNC: 111 MG/DL (ref 70–99)
GLUCOSE BLDC GLUCOMTR-MCNC: 148 MG/DL (ref 70–99)
GLUCOSE BLDC GLUCOMTR-MCNC: 91 MG/DL (ref 70–99)
GLUCOSE BLDC GLUCOMTR-MCNC: 97 MG/DL (ref 70–99)
SPECIMEN SOURCE: NORMAL

## 2017-12-22 PROCEDURE — 25000128 H RX IP 250 OP 636: Performed by: INTERNAL MEDICINE

## 2017-12-22 PROCEDURE — 25000128 H RX IP 250 OP 636: Performed by: PHYSICIAN ASSISTANT

## 2017-12-22 PROCEDURE — 40000193 ZZH STATISTIC PT WARD VISIT: Performed by: PHYSICAL THERAPIST

## 2017-12-22 PROCEDURE — 40000141 ZZH STATISTIC PERIPHERAL IV START W/O US GUIDANCE

## 2017-12-22 PROCEDURE — 40000914 ZZH STATISTIC SITTER, DAY HOURS

## 2017-12-22 PROCEDURE — 97163 PT EVAL HIGH COMPLEX 45 MIN: CPT | Mod: GP | Performed by: PHYSICAL THERAPIST

## 2017-12-22 PROCEDURE — 40000915 ZZH STATISTIC SITTER, EVENING HOURS

## 2017-12-22 PROCEDURE — 99221 1ST HOSP IP/OBS SF/LOW 40: CPT | Performed by: PSYCHIATRY & NEUROLOGY

## 2017-12-22 PROCEDURE — 12000000 ZZH R&B MED SURG/OB

## 2017-12-22 PROCEDURE — 99233 SBSQ HOSP IP/OBS HIGH 50: CPT | Performed by: NURSE PRACTITIONER

## 2017-12-22 PROCEDURE — 00000146 ZZHCL STATISTIC GLUCOSE BY METER IP

## 2017-12-22 PROCEDURE — 99207 ZZC CDG-CHARGE REQUIRED MANUAL ENTRY: CPT | Performed by: NURSE PRACTITIONER

## 2017-12-22 PROCEDURE — A9270 NON-COVERED ITEM OR SERVICE: HCPCS | Mod: GY | Performed by: PHYSICIAN ASSISTANT

## 2017-12-22 PROCEDURE — 25000132 ZZH RX MED GY IP 250 OP 250 PS 637: Mod: GY | Performed by: PHYSICIAN ASSISTANT

## 2017-12-22 PROCEDURE — 25000132 ZZH RX MED GY IP 250 OP 250 PS 637: Mod: GY | Performed by: INTERNAL MEDICINE

## 2017-12-22 PROCEDURE — A9270 NON-COVERED ITEM OR SERVICE: HCPCS | Mod: GY | Performed by: INTERNAL MEDICINE

## 2017-12-22 RX ORDER — OLANZAPINE 10 MG/2ML
5-10 INJECTION, POWDER, FOR SOLUTION INTRAMUSCULAR EVERY 6 HOURS PRN
Status: DISCONTINUED | OUTPATIENT
Start: 2017-12-22 | End: 2017-12-25 | Stop reason: HOSPADM

## 2017-12-22 RX ORDER — METHYLPREDNISOLONE SODIUM SUCCINATE 40 MG/ML
4 INJECTION, POWDER, LYOPHILIZED, FOR SOLUTION INTRAMUSCULAR; INTRAVENOUS EVERY 24 HOURS
Status: DISCONTINUED | OUTPATIENT
Start: 2017-12-22 | End: 2017-12-24

## 2017-12-22 RX ADMIN — HYDRALAZINE HYDROCHLORIDE 10 MG: 20 INJECTION INTRAMUSCULAR; INTRAVENOUS at 03:31

## 2017-12-22 RX ADMIN — MEROPENEM AND SODIUM CHLORIDE 1 G: 1 INJECTION, SOLUTION INTRAVENOUS at 18:28

## 2017-12-22 RX ADMIN — HALOPERIDOL LACTATE 5 MG: 5 INJECTION, SOLUTION INTRAMUSCULAR at 06:24

## 2017-12-22 RX ADMIN — MEROPENEM AND SODIUM CHLORIDE 1 G: 1 INJECTION, SOLUTION INTRAVENOUS at 09:24

## 2017-12-22 RX ADMIN — LABETALOL HYDROCHLORIDE 10 MG: 5 INJECTION, SOLUTION INTRAVENOUS at 00:06

## 2017-12-22 RX ADMIN — SODIUM CHLORIDE, POTASSIUM CHLORIDE, SODIUM LACTATE AND CALCIUM CHLORIDE: 600; 310; 30; 20 INJECTION, SOLUTION INTRAVENOUS at 18:28

## 2017-12-22 RX ADMIN — HALOPERIDOL LACTATE 5 MG: 5 INJECTION, SOLUTION INTRAMUSCULAR at 00:01

## 2017-12-22 RX ADMIN — MICONAZOLE NITRATE: 2 POWDER TOPICAL at 08:18

## 2017-12-22 RX ADMIN — MEROPENEM AND SODIUM CHLORIDE 1 G: 1 INJECTION, SOLUTION INTRAVENOUS at 01:34

## 2017-12-22 RX ADMIN — GABAPENTIN 300 MG: 300 CAPSULE ORAL at 21:30

## 2017-12-22 RX ADMIN — HYDRALAZINE HYDROCHLORIDE 10 MG: 20 INJECTION INTRAMUSCULAR; INTRAVENOUS at 16:41

## 2017-12-22 RX ADMIN — TACROLIMUS 55 MCG/HR: 5 INJECTION, SOLUTION INTRAVENOUS at 13:45

## 2017-12-22 RX ADMIN — LABETALOL HYDROCHLORIDE 10 MG: 5 INJECTION, SOLUTION INTRAVENOUS at 01:57

## 2017-12-22 RX ADMIN — LABETALOL HYDROCHLORIDE 20 MG: 5 INJECTION, SOLUTION INTRAVENOUS at 14:21

## 2017-12-22 RX ADMIN — ENOXAPARIN SODIUM 40 MG: 40 INJECTION SUBCUTANEOUS at 21:31

## 2017-12-22 RX ADMIN — METHYLPREDNISOLONE SODIUM SUCCINATE 4 MG: 40 INJECTION, POWDER, FOR SOLUTION INTRAMUSCULAR; INTRAVENOUS at 11:28

## 2017-12-22 RX ADMIN — FAMOTIDINE 20 MG: 20 TABLET ORAL at 21:30

## 2017-12-22 RX ADMIN — Medication 150 MG: at 21:30

## 2017-12-22 RX ADMIN — OXCARBAZEPINE 300 MG: 300 TABLET, FILM COATED ORAL at 21:30

## 2017-12-22 RX ADMIN — MICONAZOLE NITRATE: 2 POWDER TOPICAL at 21:31

## 2017-12-22 ASSESSMENT — PAIN DESCRIPTION - DESCRIPTORS: DESCRIPTORS: SORE

## 2017-12-22 NOTE — PLAN OF CARE
Problem: Patient Care Overview  Goal: Plan of Care/Patient Progress Review  Outcome: No Change  Neuro: pt is A & O x4 but cam positive. Pt states she sees other people in the hallway and that there are other men in her room. Pt is very uncooperative and refusing all cares. Multiple staff attempted to given pts PO meds, but pt refused. Pt refused all cares, blood sugars and labs.  is aware about pt state. MD aware as well, PRN haldol given x2. Pt pulled out IV. Still has one working IV that is infusing. IV team paged.  CV: PRN given for hypertension,labetalol given and hydralazine. SR w/ PVC's.  Pulm: LS clear. PT on nasal canula with humidity.. Pt tolerates RA fine but desats while sleeping to low 80's but comes back up.   GI/: rico with adequate output. normal active bowel sounds.   Skin: See wound LDA.   Restraints: remain in place to keep adequacy of lines and pt safety.   Plan: Pt  updated via bedside and phone. Pt uncooperative. Continue to monitor.

## 2017-12-22 NOTE — PROGRESS NOTES
MD notified about increase agitation, delirium and refusing oral medications. PRN ordered. Will continue to monitor.

## 2017-12-22 NOTE — PLAN OF CARE
"Problem: Patient Care Overview  Goal: Plan of Care/Patient Progress Review  PT: Orders received. Chart reviewed. Evaluation initiated. Pt is a 72 year old female admitted on 12/19 and Intubated for UTI with sepsis. Pt extubated 12/21. Pt lives in a house with spouse who helps care for neurogenic bladder. Pt declined to give PLOF answers to therapist questions during eval and kept repeating\" I can't answer that\" even when reworded for understanding. Pt seems to have a PCA that give bed baths. Pt declined any mobility assessment.  Discharge Planner PT   Patient plan for discharge: Pt wants to go home by [place]  Current status: See above. Pt had just changed rooms and a lift was required to transfer to bed.  Barriers to return to prior living situation: not known at this time  Recommendations for discharge: TBD depending on mobility assessment  Rationale for recommendations: See above       Entered by: Eunice Lopez 12/22/2017 4:14 PM         "

## 2017-12-22 NOTE — CONSULTS
[facility] Initial Psychiatric Consult Note      TIME SPENT IN PSYCHIATRY INITIAL CONSULT: 55 MINUTES    Consult ordered by: Barb Elaine PA-C.  Reason: Encephalopathy, distrust, depression.     Initial History     The patient's care was discussed, patient seen and chart notes were reviewed.    Patient examined for psychiatric consultation.     IDENTIFICATION    Pt is a 72 year old  [place] female. Pt sees PCP Marivel Macias. Pt seen on ICU for encephalopathy and depression.    HISTORY OF PRESENT ILLNESS    Ms. Gem Jade is a 72 year old female with an extensive and complex medical history, including kidney transplant with end-stage renal disease in 1999, recurrent UTIs, and Type 2 diabetes mellitus. She is on a number of medications. She was initially admitted due to altered mental status and intubated for airway protection as she was unresponsive and only responding to painful stimuli. She has been recently extubated and is alert, but unoriented. Pt is unable to provide history as she is mistrusting of caregivers. Collateral history obtained by staff suggest that she has a history of experiencing delirious episodes following a UTI that persist for up to two weeks. She has declined to take her po medications voluntarily and is only receiving IV medications. She is quite mistrustful of staff and does not believe that they wish to help her. She is agreeable to having her  contacted. She has apparently been having hallucinations that family members are present and calls out to them. Head CT did not reveal any significant abnormalities.     CHEMICAL DEPENDENCY HISTORY    None per chart review.    PAST PSYCHIATRIC HISTORY    She has been seen by Psychiatry here twice in 2012 and 2014. In 2014, she had a similar presentation with altered mental status secondary to UTI. Past medication trials per chart review include Prozac and Ativan    FAMILY HISTORY    None per chart  review.    SOCIAL HISTORY    Per chart review, pt lives with her .  This is her second ,  for a number of years.  Has 1 biological child and 1 adopted child from [place].  Is originally from [place].  Studied up to high school, was a homemaker.  Her  works as a .  Currently she is retired.      Medications     Prescriptions Prior to Admission   Medication Sig Dispense Refill Last Dose     BACLOFEN PO Take 10 mg by mouth 3 times daily as needed for muscle spasms        levothyroxine (SYNTHROID/LEVOTHROID) 50 MCG tablet TAKE ONE TABLET BY MOUTH EVERY DAY 90 tablet 3 12/18/2017 at Unknown time     tacrolimus (GENERIC EQUIVALENT) 1 MG capsule Take 2 capsules (2 mg) by mouth every evening (total dose 3.5 mg twice daily) (Patient taking differently: Take 2 mg by mouth 2 times daily ) 180 capsule 3 12/18/2017 at Unknown time     diphenoxylate-atropine (LOMOTIL) 2.5-0.025 MG per tablet Take 1 tablet by mouth 4 times daily as needed for diarrhea 30 tablet 0 prn     meclizine (ANTIVERT) 12.5 MG tablet Take 1 tablet (12.5 mg) by mouth 3 times daily as needed for dizziness 30 tablet 0 prn     gabapentin (NEURONTIN) 300 MG capsule Take 3 capsules (900 mg) by mouth 2 times daily For pain 180 capsule 3 12/18/2017 at Unknown time     cloNIDine (CATAPRES) 0.1 MG tablet TAKE ONE TABLET BY MOUTH TWICE A  tablet 3 12/18/2017 at Unknown time     Cholecalciferol (VITAMIN D) 2000 UNITS tablet Take 4,000 Units by mouth 2 times daily 100 tablet 3 12/18/2017 at Unknown time     predniSONE (DELTASONE) 5 MG tablet Take 1 tablet (5 mg) by mouth daily 90 tablet 3 12/18/2017 at Unknown time     insulin glargine (LANTUS SOLOSTAR) 100 UNIT/ML injection Inject 24 units under the skin daily. (Patient taking differently: Inject 24 Units Subcutaneous At Bedtime Inject 24 units under the skin daily.) 15 mL 11 12/18/2017 at Unknown time     insulin aspart (NOVOLOG FLEXPEN) 100 UNIT/ML injection Inject 10  units under the skin 2 times daily before meals as directed. Add sliding scale as needed. Avg use 25 units per day (Patient taking differently: Inject 10 units under the skin 2 times daily before meals as directed. Add sliding scale as needed.  2 units for every 50 over 201.  Avg use 25 units per day) 15 mL 11 Past Week at Unknown time     lidocaine (XYLOCAINE) 2 % topical gel Apply topically as needed for moderate pain 10 ML every 3 weeks 30 mL 11 prn     labetalol (NORMODYNE) 300 MG tablet Take 150 mg by mouth 2 times daily Take 1/2 tablet (150mg) =300mg. Hold for systolic BP less than 120. 120 tablet 3 12/18/2017 at Unknown time     loratadine (CLARITIN) 10 MG tablet Take 1 tablet (10 mg) by mouth daily 90 tablet 3 12/18/2017 at Unknown time     pravastatin (PRAVACHOL) 10 MG tablet TAKE ONE TABLET BY MOUTH EVERY DAY 90 tablet 3 12/18/2017 at Unknown time     OXcarbazepine (TRILEPTAL) 300 MG tablet Take 1 tablet (300 mg) by mouth 2 times daily For pain 180 tablet 3 12/18/2017 at Unknown time     aspirin 81 MG chewable tablet Take 1 tablet (81 mg) by mouth daily Starting 1 week after your kidney biopsy 90 tablet 3 12/18/2017 at Unknown time     cinacalcet (SENSIPAR) 30 MG tablet Take 1 tablet (30 mg) by mouth daily 60 tablet 3 12/18/2017 at Unknown time     cyanocobalamin (VITAMIN B12) 1000 MCG/ML injection Inject 1 mL (1,000 mcg) into the muscle every 30 days 3 mL 3 11/19/2017     TraMADol HCl 50 MG TBDP Take 50 mg by mouth 3 times daily as needed 90 tablet 3 prn     ondansetron (ZOFRAN ODT) 4 MG disintegrating tablet Take 1-2 tablets (4-8 mg) by mouth every 8 hours as needed for nausea 20 tablet 5 prn     simethicone (MYLICON) 125 MG CHEW Take 1 tablet (125 mg) by mouth as needed for intestinal gas 120 tablet  12/18/2017 at Unknown time     Lactobacillus Rhamnosus, GG, (CULTURELL) capsule Take 1 capsule by mouth 2 times daily 60 capsule 11 12/18/2017 at Unknown time     Cranberry 400 MG TABS Take 400 mg by mouth  2 times daily   12/18/2017 at Unknown time     famotidine (PEPCID) 20 MG tablet Take 1 tablet (20 mg) by mouth 2 times daily 60 tablet 1 12/18/2017 at Unknown time     ACETAMINOPHEN PO Take 325-650 mg by mouth every 4 hours as needed.   prn     order for DME High back guerrero wheel chair  MICHELLE 99  DX: morbid obesity, generalized weakness, physical deconditioning, chronic vertigo 1 Device 0 Taking     blood glucose monitoring (ACCU-CHEK COMPACT PLUS) test strip Use to test blood sugar 3 times daily or as directed.  Ok to substitute alternative if insurance prefers. 100 strip prn Taking     order for DME Equipment being ordered: Silver Impregnated catheters  HX of frequent UTI 12 each 1 Taking     order for DME Equipment being ordered: [employer] inTouch FX CPAP interface (nasal pillows), size XS. 1 each 1 Taking     insulin pen needle 30G X 8 MM Use as directed with Lantus 100 each 3 Taking     order for DME Equipment being ordered: Blue Chux  [facility] Taking     ORDER FOR DME Equipment being ordered: Johnson Catheters - 18 french 3 catheter PRN Taking       Scheduled Medications    methylPREDNISolone  4 mg Intravenous Q24H     labetalol  150 mg Oral BID     gabapentin  300 mg Oral BID     pravastatin  10 mg Oral Daily     OXcarbazepine  300 mg Oral BID     levothyroxine  50 mcg Oral Daily     aspirin  81 mg Oral Daily     cinacalcet  30 mg Oral Daily     cloNIDine  0.1 mg Oral BID     enoxaparin  40 mg Subcutaneous Q24H     miconazole   Topical BID     meropenem  1 g Intravenous Q8H     insulin aspart  1-6 Units Subcutaneous Q4H     famotidine  20 mg Oral BID     PRNs:  hydrALAZINE, haloperidol lactate, baclofen (LIORESAL) tablet 10 mg, glucose **OR** dextrose **OR** glucagon, naloxone, senna-docusate **OR** senna-docusate, labetalol      Allergies        Allergies   Allergen Reactions     Bactrim Ds [Sulfamethoxazole W/Trimethoprim]      Creatinine level increased     Atorvastatin Calcium       "myalgias, muscle aches     Codeine Nausea and Vomiting     Darvocet [Propoxyphene N-Apap] Nausea and Vomiting     Hydromorphone      Levofloxacin Other (See Comments) and Diarrhea     hallucinations     Morphine      Nausea and vomiting     Niaspan [Niacin] GI Disturbance     Flushing even at low dose, GI upset     Percocet [Oxycodone-Acetaminophen]      Vomiting after taking med couple of times     Vicodin [Hydrocodone-Acetaminophen] Nausea and Vomiting        Previous Medical History     Past Medical History:   Diagnosis Date     Background diabetic retinopathy(362.01)      Diabetic gastroparesis (H)      Diarrhea      Dizziness and giddiness      Hyponatremia 12/16/11     Kidney replaced by transplant      Mixed hyperlipidemia      Obesity      Osteopenia 11/07     Other and unspecified hyperlipidemia      Other pulmonary embolism and infarction 3/03     Polyneuropathy in diabetes(357.2)      Primary localized osteoarthrosis, lower leg      Pyelonephritis, unspecified      Type 2 diabetes mellitus with complications (H)      Urinary tract infection, site not specified      UTI (urinary tract infection) due to urinary indwelling catheter (H)         Medical Review of Systems     /75  Pulse 72  Temp 98.2  F (36.8  C) (Axillary)  Resp 13  Ht 1.676 m (5' 6\")  Wt 102.5 kg (225 lb 15.5 oz)  SpO2 100%  BMI 36.47 kg/m2  Body mass index is 36.47 kg/(m^2).    Previous 10-point ROS completed by Jeannette Hurtado MD on 12/19/17 reviewed by Dimitry Barney MD on December 22, 2017 and is unchanged except for those problems mentioned within the HPI.      Mental Status Examination     Appearance Lying in bed, dressed in gown. Appears stated age.   Attitude Uncooperative, suspicious   Orientation Oriented to person only.   Eye Contact Intense   Speech Paucity of speech   Language Impaired   Psychomotor Behavior Slowed   Thought Process Disorganized   Associations Loose   Thought Content Unable to assess   Mood " Alexithymic   Affect Flat   Fund of Knowledge Impaired   Insight Poor   Judgement Poor   Attention Span & Concentration Poor   Recent & Remote Memory Impaired   Gait N/A   Muscle Tone Weak      Labs     Labs reviewed.  Recent Results (from the past 24 hour(s))   Glucose by meter    Collection Time: 12/21/17  1:09 PM   Result Value Ref Range    Glucose 116 (H) 70 - 99 mg/dL   Glucose by meter    Collection Time: 12/22/17  9:27 AM   Result Value Ref Range    Glucose 97 70 - 99 mg/dL        Impression     This is a 72 year old female with a history of recurrent UTIs and subsequent delirium who presents yet again with delirium secondary to UTI. At this point, will continue her PTA medications as she can tolerate and voluntarily take. Will begin Zyprexa IM 5-10mg q6h prn for agitation as needed. Psychiatry can follow-up if needed.     Diagnoses     1. Delirium, probably related to urinary tract infection, rule out polypharmacy.   2. 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 Explained side effects, benefits and complications of medications to the patient.  2. Medication Changes: Begin Zyprexa IM 5-10mg q6h prn.  3. Discussed treatment plan with patient and team.  4. Re-consult Psychiatry as needed.      TIME SPENT IN PSYCHIATRY INITIAL CONSULT: 55 MINUTES     Attestation:   Patient has been seen and evaluated by me, Dimitry Barney MD.    Patient ID:  Name: Gem Jade  MRN: [id number]  Admission: 12/19/2017  YOB: 1945

## 2017-12-22 NOTE — PLAN OF CARE
Problem: Patient Care Overview  Goal: Plan of Care/Patient Progress Review  Pt alert and oriented, refused medications today. Provider and  informed. Pt paranoid, hallucinating and resistive/refusing cares. Medications changed to IV. Patient provided with emotional support and reorientation. Became more cooperative throughout shift but continued to refuse oral medications. Refused turns but did allow weight shifts. Refused full inspection of skin. Refused food/fluids. Did allow FSBG checks at times. Allowed rico care.   Patient transferred to  per transfer orders.

## 2017-12-22 NOTE — PROGRESS NOTES
[facility]    Hospitalist Progress Note    Assessment & Plan   Gem Jade is a 72 year old female who was admitted on 12/19/2017 for evaluation and management of altered mental status. She was intubated for airway protection. Work-up was notable for concern of sepsis secondary to urinary tract infection; Vancomycin and Meropenem IV were initiated. Her vital signs stabilized and she did not require vasopressor support. She was successfully extubated on 12/21/2017 and is currently not requiring oxygen. Vancomycin was discontinued on 12/21/2017. Post-extubation she has been alert and oriented, but paranoid of staff. She is currently refusing food and oral medications. Tacrolimus and steroid dosing is being converted to IV. Blood and urine cultures have been negative-to-date. Infectious Disease has been consulted for multiple similar admissions and prior history of VRE, ESBL, and C. Dif. She is status-post renal transplant in 1999, UOP has been > 1- liter/day.    Sepsis, secondary to urinary tract infection, resolving  Chronic urinary tract infection  Neurogenic bladder, requiring chronic indwelling Johnson  Personal history of klebsiella, ESBL, VRE, and citrobacter freundii complex UTI  Personal history of candiduria, November 2017  Mrs. Jade has had frequent episodes of altered mental status secondary to sepsis caused by urinary tract infection. She was recently admitted in November 2017 for same here and [facility]. During her previous admission at [facility] ID considered long-term antibiotic, however elected to not use long-term therapy secondary to history of C.Dif. She continues on Meropenem 1- gram every 8-hours. She received Vancomycin from 12/19/2017-12/21/2017. Johnson was changed in ED on 12/19/2017. By report her  changes indwelling Johnson at home and has not been using sterile technique.   - okay to transfer out of ICU  - continue Meropenem; will wait to discontinue under the direction of ID  -  ID consult, appreciate their input    Acute hypoxic respiratory failure, secondary to sepsis, resolving  Mrs. Jade was intubated in the ED on 12/19/2017 for airway protection and successfully extubated on 12/21/2017. She is currently requiring minimal to no oxygen.   - continue pulmonary hygiene    Status post renal transplant, 1999, immunosuppressed  Chronic renal failure, GFR 48  Nephrology has been consulted. Last tacrolimus level 3.8 on 12/11/2017. She has been refusing oral medications; steroids and tacrolimus has been transitioned to IV dosing. She has remained normo-hypertensive and was not given stress-dose steroids. UOP > 1- liter/day.  - appreciate Nephrology input  - tacrolimus and steroids IV as ordered  - hold Cinacalcet if not eating  - avoid nephrotoxic agents, renal dosing of medications as appropriate  - continue gentle hydration of NS at 75- cc/hour  - avoid hypotension    Acute encephalopathy secondary to sepsis, improving  Upon my exam she is alert and oriented, only confused to day of the week. Head CT on 12/19/2017 without acute pathology. EEG on 12/21/2017 without concern for seizures. Ammonia level is normal. Her affect is flat with limited eye contact. She is slow to answer and appears very paranoid. Continues with bilateral soft wrist restraints due to pulling at IVs. By report her  states this is typical after extubation. She is on many medications that could blunt her affect, but there is concern for depression.  - appreciate Psychiatric consult   - avoid additional opioids and sedating medication  - discontinued Haldol; okay to use Zyprexa for agitation   - continue home baclofen PRN  - continue gabapentin, dose reduces 12/21/2017 to 300- mg BID from 900- mg BID    Hypertension, treated  Hyperlipidemia, treated  Chronic, stable. Has remained normo-hypertensive and did not require vasopressor support with sepsis. Home medications have been resumed.  - avoid hypotension  - continue  home clonidine and lisinopril  - PRN hydralazine for SBP > 180  - okay to continue pravastatin    Personal history of deep vein thrombosis and pulmonary embolism  Mrs. Jade is not on chronic anticoagulation. Bilateral calves are soft and non-tender, no hypoxia.  - continue to monitor  - prophylactic Lovenox, SCDs    Diabetes mellitus, type II, hemoglobin A1c 6.7%, 12/19/2017, complicated by gastroparesis, retinopathy, and polyneuropathy  Chronic, stable. Lantus has been held since admission with finger sticks .  - medium correction scale  - continue NPO dosing since refusing food   - with blood glucose range of  will continue to hold home Lantus 24- units every evening    Anemia of chronic disease, range 10.6-11.8  Chronic, stable. No obvious signs of blood loss.  - continue to monitor    Thrombocytopenia, platelet count 127 today, likely secondary to immunosuppression and acute illness   Per EMR review typically low-normal. Has received Heparin, no concern for HIT at this time. No obvious signs of blood loss.   - continue to monitor     Impaired physical mobility and debility, chronic, secondary to polyneuropathy, vertigo, and Charcot foot   Per EMR review is bedridden with severely limited mobility. Her  is her primary caretaker and she lives locally in her own home.   - continue PT/OT consults     DVT Prophylaxis: Enoxaparin (Lovenox) SQ and Pneumatic Compression Devices  Code Status: Full Code    Disposition: Pending resolution of encephalopathy     The above assessment and plan has been discussed with Dr. Camargo, who is in agreement with the above assessment and plan.   Radha Vera, APRN, CNP  Hospitalist Service, House Officer  [facility]     Text Page  Pager: [phone]  Physical Exam   Temp: 98.2  F (36.8  C) Temp src: Axillary BP: 137/87   Heart Rate: 84 Resp: 13 SpO2: 100 % O2 Device: Nasal cannula with humidification Oxygen Delivery: 4 LPM  Vitals:    12/20/17 0400  12/21/17 0400 12/22/17 0600   Weight: 100.4 kg (221 lb 5.5 oz) 102.5 kg (225 lb 15.5 oz) 102.5 kg (225 lb 15.5 oz)     Vital Signs with Ranges  Temp:  [98.2  F (36.8  C)-98.6  F (37  C)] 98.2  F (36.8  C)  Heart Rate:  [69-91] 84  Resp:  [6-20] 13  BP: (110-197)/(46-91) 137/87  FiO2 (%):  [40 %] 40 %  SpO2:  [91 %-100 %] 100 %  I/O last 3 completed shifts:  In: 1940 [I.V.:1900; NG/GT:40]  Out: 1850 [Urine:1850]    General: Appears stated age, no acute distress.  Skin:  Warm, dry. No rashes or lesions on exposed skin.  HEENT:  Normocephalic, atraumatic.  Chest:  Bilateral anterior and lateral lung fields clear to auscultation. No increased work of breathing. Does require supplemental oxygen.  Cardiovascular:  Regular rate and rhythm, without murmur, rub, or gallop. Bilateral upper and lower distal pulses palpable. Bilateral pedal edema, 2+.   Abdomen:  Soft, non-tender, non-distended. Bowel sounds present.   Musculoskeletal:  Moves all four extremities.   Neurological:  Alert and oriented x 3, confused to day-of-the-week. Cranial nerves II-XII grossly intact.   Psychiatric:  Affect and mood flat, poor eye contact.     Medications     tacrolimus (Prograf) infusion ADULT       lactated ringers 75 mL/hr at 12/21/17 0910       methylPREDNISolone  4 mg Intravenous Q24H     labetalol  150 mg Oral BID     gabapentin  300 mg Oral BID     pravastatin  10 mg Oral Daily     OXcarbazepine  300 mg Oral BID     levothyroxine  50 mcg Oral Daily     aspirin  81 mg Oral Daily     cinacalcet  30 mg Oral Daily     cloNIDine  0.1 mg Oral BID     enoxaparin  40 mg Subcutaneous Q24H     miconazole   Topical BID     meropenem  1 g Intravenous Q8H     insulin aspart  1-6 Units Subcutaneous Q4H     famotidine  20 mg Oral BID       Data     Recent Labs  Lab 12/21/17  0452 12/20/17  0525 12/19/17  1602   WBC 8.9 6.0 6.9   HGB 11.8 11.4* 11.6*   MCV 95 93 95   * 135* 156    139 138   POTASSIUM 4.1 3.7 4.7   CHLORIDE 106 106 104    CO2 26 25 30   BUN 30 33* 38*   CR 1.11* 1.13* 1.21*   ANIONGAP 8 8 4   ROGER 8.1* 8.1* 8.5   GLC 83 123* 158*   ALBUMIN  --   --  3.0*   PROTTOTAL  --   --  7.7   BILITOTAL  --   --  0.4   ALKPHOS  --   --  83   ALT  --   --  21   AST  --   --  16

## 2017-12-22 NOTE — CONSULTS
[facility]    Infectious Disease Consultation     Date of Admission:  12/19/2017  Date of Consult (When I saw the patient): 12/22/17    Assessment & Plan   Gem Jade is a 72 year old female who was admitted on 12/19/2017.     Impression:  1.  A 72-year-old female well known to us from prior admissions, admitted with mental status changes, respiratory failure and probable sepsis, although not entirely clear.   2.  Acute respiratory failure, not clear pneumonia, probably related to sepsis.   3.  Recent urinary tract infection.  4.  Recurrent urinary tract infections, chronic Johnson catheter in place, has had vancomycin-resistant enterococcus and extended spectrum beta lactamase in the past.   5.  Diabetes mellitus.   6.  Prior renal transplant, on chronic immunosuppressant therapy without major immunosuppressing infections, renal function slightly abnormal, but stable.   7.  Sulfa allergy and listed quinolone allergy, quinolones were challenged in past successfully. Currently on Meropenem with UC showing no bacterial growth.         RECOMMENDATIONS:   1. Continue on Meropenem for now. Given negative cultures will probably just do a short course in the hospital.       Paramjit Mckenzie MD    Reason for Consult   Reason for consult: I was asked by Barb OROURKE to evaluate this patient for UTI.    Primary Care Physician   Marivel Barcenas NP    Chief Complaint   Encephalopathy     History is obtained from the patient and medical records    History of Present Illness   The patient Gem Jade is well known to us previously.  She has a history of a prior successful renal transplant and has had several episodes of recurrent sepsis that have generally been urinary tract in origin.  She unfortunately has required a chronic Johnson catheter and with that has had chronic colonization and recurrent infections.  These have included extended spectrum beta lactamase organisms and vancomycin-resistant  enterococcus.  She has had some true urosepsis and bacteremias, but not recently.  She currently is admitted with acute mental status changes without obvious specific localizing symptoms and without clear-cut major sepsis symptoms, she developed a significant respiratory component and was intubated.  She does have grossly abnormal urine with a possible urinary tract infection as the source.      Past Medical History   I have reviewed this patient's medical history and updated it with pertinent information if needed.   Past Medical History:   Diagnosis Date     Background diabetic retinopathy(362.01)     extensive diabetic retinopathy, s/p multiple laser treatments     Diabetic gastroparesis (H)     followed by [facility] (Dr. 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  Past Surgical History:   Procedure Laterality Date     C NONSPECIFIC PROCEDURE  10/99    kidney transplant     C NONSPECIFIC PROCEDURE  8/00    MRI head, lumbar spine, pelvis     C TOTAL KNEE ARTHROPLASTY  3/03    Knee Replacement, Total right, post op PE     CHOLECYSTECTOMY       HC LEFT HEART CATHETERIZATION  1999    Cardiac Cath, Left Heart, Negative  (@ [facility])     HC LEFT HEART CATHETERIZATION  4/05    normal coronary  angiogram at [facility], had mild troponin rise (0.71)     HC REMV CATARACT EXTRACAP,INSERT LENS  8/04    bilateral cataract repaired, left eye        Prior to Admission Medications   Prior to Admission Medications   Prescriptions Last Dose Informant Patient Reported? Taking?   ACETAMINOPHEN PO  Spouse/Significant Other Yes Yes   Sig: Take 325-650 mg by mouth every 4 hours as needed.   BACLOFEN PO  Care Giver Yes Yes   Sig: Take 10 mg by mouth 3 times daily as needed for muscle spasms   Cholecalciferol (VITAMIN D) 2000 UNITS tablet 12/18/2017 at Unknown time Spouse/Significant Other Yes Yes   Sig: Take 4,000 Units by mouth 2 times daily   Cranberry 400 MG TABS 12/18/2017 at Unknown time Spouse/Significant Other Yes Yes   Sig: Take 400 mg by mouth 2 times daily   Lactobacillus Rhamnosus, GG, (CULTURELL) capsule 12/18/2017 at Unknown time Spouse/Significant Other No Yes   Sig: Take 1 capsule by mouth 2 times daily   ORDER FOR DME  Spouse/Significant Other No No   Sig: Equipment being ordered: Johnson Catheters - 18 french   OXcarbazepine (TRILEPTAL) 300 MG tablet 12/18/2017 at Unknown time Spouse/Significant Other Yes Yes   Sig: Take 1 tablet (300 mg) by mouth 2 times daily For pain   TraMADol HCl 50 MG TBDP  Spouse/Significant Other No Yes   Sig: Take 50 mg by mouth 3 times daily as needed   aspirin 81 MG chewable tablet 12/18/2017 at Unknown time Spouse/Significant Other No Yes   Sig: Take 1 tablet (81 mg) by mouth daily Starting 1 week after your kidney biopsy   blood glucose monitoring (ACCU-CHEK COMPACT PLUS) test strip  Spouse/Significant Other Yes No   Sig: Use to test blood sugar 3 times daily or as directed.  Ok to substitute alternative if insurance prefers.   cinacalcet (SENSIPAR) 30 MG tablet 12/18/2017 at Unknown time Spouse/Significant Other No Yes   Sig: Take 1 tablet (30 mg) by mouth daily   cloNIDine (CATAPRES) 0.1 MG tablet 12/18/2017 at Unknown time Spouse/Significant Other No Yes   Sig: TAKE ONE TABLET BY  MOUTH TWICE A DAY   cyanocobalamin (VITAMIN B12) 1000 MCG/ML injection 11/19/2017 Care Giver No Yes   Sig: Inject 1 mL (1,000 mcg) into the muscle every 30 days   diphenoxylate-atropine (LOMOTIL) 2.5-0.025 MG per tablet  Spouse/Significant Other No Yes   Sig: Take 1 tablet by mouth 4 times daily as needed for diarrhea   famotidine (PEPCID) 20 MG tablet 12/18/2017 at Unknown time Spouse/Significant Other Yes Yes   Sig: Take 1 tablet (20 mg) by mouth 2 times daily   gabapentin (NEURONTIN) 300 MG capsule 12/18/2017 at Unknown time Spouse/Significant Other No Yes   Sig: Take 3 capsules (900 mg) by mouth 2 times daily For pain   insulin aspart (NOVOLOG FLEXPEN) 100 UNIT/ML injection Past Week at Unknown time Care Giver No Yes   Sig: Inject 10 units under the skin 2 times daily before meals as directed. Add sliding scale as needed. Avg use 25 units per day   Patient taking differently: Inject 10 units under the skin 2 times daily before meals as directed. Add sliding scale as needed.  2 units for every 50 over 201.  Avg use 25 units per day   insulin glargine (LANTUS SOLOSTAR) 100 UNIT/ML injection 12/18/2017 at Unknown time Care Giver No Yes   Sig: Inject 24 units under the skin daily.   Patient taking differently: Inject 24 Units Subcutaneous At Bedtime Inject 24 units under the skin daily.   insulin pen needle 30G X 8 MM  Spouse/Significant Other No No   Sig: Use as directed with Lantus   labetalol (NORMODYNE) 300 MG tablet 12/18/2017 at Unknown time Spouse/Significant Other Yes Yes   Sig: Take 150 mg by mouth 2 times daily Take 1/2 tablet (150mg) =300mg. Hold for systolic BP less than 120.   levothyroxine (SYNTHROID/LEVOTHROID) 50 MCG tablet 12/18/2017 at Unknown time Spouse/Significant Other No Yes   Sig: TAKE ONE TABLET BY MOUTH EVERY DAY   lidocaine (XYLOCAINE) 2 % topical gel  Spouse/Significant Other No Yes   Sig: Apply topically as needed for moderate pain 10 ML every 3 weeks   loratadine (CLARITIN) 10 MG tablet  12/18/2017 at Unknown time Spouse/Significant Other No Yes   Sig: Take 1 tablet (10 mg) by mouth daily   meclizine (ANTIVERT) 12.5 MG tablet  Spouse/Significant Other No Yes   Sig: Take 1 tablet (12.5 mg) by mouth 3 times daily as needed for dizziness   ondansetron (ZOFRAN ODT) 4 MG disintegrating tablet  Spouse/Significant Other No Yes   Sig: Take 1-2 tablets (4-8 mg) by mouth every 8 hours as needed for nausea   order for DME  Spouse/Significant Other No No   Sig: Equipment being ordered: Blue Chux  Please send to [facility]   order for DME  Spouse/Significant Other No No   Sig: Equipment being ordered: [employer] FX CPAP interface (nasal pillows), size XS.   order for DME  Spouse/Significant Other No No   Sig: Equipment being ordered: Silver Impregnated catheters  HX of frequent UTI   order for DME  Spouse/Significant Other No No   Sig: High back guerrero wheel chair  MICHELLE 99  DX: morbid obesity, generalized weakness, physical deconditioning, chronic vertigo   pravastatin (PRAVACHOL) 10 MG tablet 12/18/2017 at Unknown time Spouse/Significant Other No Yes   Sig: TAKE ONE TABLET BY MOUTH EVERY DAY   predniSONE (DELTASONE) 5 MG tablet 12/18/2017 at Unknown time Spouse/Significant Other No Yes   Sig: Take 1 tablet (5 mg) by mouth daily   simethicone (MYLICON) 125 MG CHEW 12/18/2017 at Unknown time Spouse/Significant Other Yes Yes   Sig: Take 1 tablet (125 mg) by mouth as needed for intestinal gas   tacrolimus (GENERIC EQUIVALENT) 1 MG capsule 12/18/2017 at Unknown time Spouse/Significant Other No Yes   Sig: Take 2 capsules (2 mg) by mouth every evening (total dose 3.5 mg twice daily)   Patient taking differently: Take 2 mg by mouth 2 times daily       Facility-Administered Medications: None     Allergies   Allergies   Allergen Reactions     Bactrim Ds [Sulfamethoxazole W/Trimethoprim]      Creatinine level increased     Atorvastatin Calcium      myalgias, muscle aches     Codeine Nausea and Vomiting     Darvocet  [Propoxyphene N-Apap] Nausea and Vomiting     Hydromorphone      Levofloxacin Other (See Comments) and Diarrhea     hallucinations     Morphine      Nausea and vomiting     Niaspan [Niacin] GI Disturbance     Flushing even at low dose, GI upset     Percocet [Oxycodone-Acetaminophen]      Vomiting after taking med couple of times     Vicodin [Hydrocodone-Acetaminophen] Nausea and Vomiting       Immunization History   Immunization History   Administered Date(s) Administered     HepB 01/13/2003     Influenza (H1N1) 11/25/2009     Influenza (High Dose) 3 valent vaccine 12/01/2015, 10/07/2016     Influenza (IIV3) PF 11/30/2000, 11/04/2002, 11/14/2003, 10/22/2004, 11/03/2005, 12/04/2006, 11/12/2007, 10/21/2008, 10/06/2009, 11/09/2010, 11/05/2011     Influenza Vaccine IM 3yrs+ 4 Valent IIV4 10/16/2013     Pneumo Conj 13-V (2010&after) 12/01/2015     Pneumococcal 23 valent 06/17/2013     TDAP Vaccine (Adacel) 06/17/2013     Zoster vaccine, live 06/17/2013       Social History   I have reviewed this patient's social history and updated it with pertinent information if needed. Gem Jade  reports that she quit smoking about 30 years ago. She quit after 17.00 years of use. She does not have any smokeless tobacco history on file. She reports that she does not drink alcohol or use illicit drugs.    Family History   I have reviewed this patient's family history and updated it with pertinent information if needed.   Family History   Problem Relation Age of Onset     DIABETES Mother      DIABETES Brother      Hypertension Father      [id number] pneumonia     HEART DISEASE Father        Review of Systems   The 10 point Review of Systems is negative other than noted in the HPI or here.     Physical Exam   Temp: 98.2  F (36.8  C) Temp src: Axillary BP: 137/87   Heart Rate: 84 Resp: 13 SpO2: 100 % O2 Device: Nasal cannula with humidification Oxygen Delivery: 4 LPM  Vital Signs with Ranges  Temp:  [98.2  F (36.8  C)-98.6  F (37   C)] 98.2  F (36.8  C)  Heart Rate:  [69-91] 84  Resp:  [6-20] 13  BP: (110-197)/(46-91) 137/87  FiO2 (%):  [40 %] 40 %  SpO2:  [91 %-100 %] 100 %  225 lbs 15.54 oz    GENERAL APPEARANCE:  alert and no distress  EYES: Eyes grossly normal to inspection, PERRL and conjunctivae and sclerae normal  HENT: ear canals and TM's normal and nose and mouth without ulcers or lesions  NECK: no adenopathy, no asymmetry, masses, or scars and thyroid normal to palpation  RESP: lungs clear to auscultation - no rales, rhonchi or wheezes  CV: regular rates and rhythm, normal S1 S2, no S3 or S4 and no murmur, click or rub  LYMPHATICS: normal ant/post cervical and supraclavicular nodes  ABDOMEN: soft, nontender, without hepatosplenomegaly or masses and bowel sounds normal  MS: extremities normal- no gross deformities noted  SKIN: no suspicious lesions or rashes      Data   Lab Results   Component Value Date    WBC 8.9 12/21/2017    HGB 11.8 12/21/2017    HCT 37.1 12/21/2017     (L) 12/21/2017     12/21/2017    POTASSIUM 4.1 12/21/2017    CHLORIDE 106 12/21/2017    CO2 26 12/21/2017    BUN 30 12/21/2017    CR 1.11 (H) 12/21/2017    GLC 83 12/21/2017    SED 53 (H) 05/25/2016    NTBNPI 660 01/10/2016    TROPONIN <0.04 06/15/2007    TROPI <0.015 11/13/2017    AST 16 12/19/2017    ALT 21 12/19/2017    ALKPHOS 83 12/19/2017    BILITOTAL 0.4 12/19/2017    NOREEN 17 12/20/2017    INR 1.09 11/06/2017       Recent Labs  Lab 12/19/17  2233 12/19/17  1900 12/19/17  1620 12/19/17  1619 12/19/17  1602   CULT No MRSA isolated No growth after 3 days No growth No growth after 3 days No growth after 3 days     Recent Labs   Lab Test  12/19/17   2233  12/19/17   1900  12/19/17   1620  12/19/17   1619  12/19/17   1602  11/13/17   1714  11/13/17   1646  11/13/17   1613  11/06/17   1802   CULT  No MRSA isolated  No growth after 3 days  No growth  No growth after 3 days  No growth after 3 days  No growth  >100,000 colonies/mL  Candida albicans /  dubliniensis  Candida albicans and Candida dubliniensis are not routinely speciated  Susceptibility testing not routinely done  *  No growth  >100,000 colonies/mL  Citrobacter freundii complex  *

## 2017-12-22 NOTE — PROGRESS NOTES
[facility]    Critical Care Service  Progress Note    Date of Service (when I saw the patient): 12/22/2017     Main Plans for Today   - psych consult   - ID consult for abx assistance   - potential conversion of tacrolimus and prednisone to IV form  - likely transfer to the floor     Assessment & Plan   Ms. Gem Jade is a 72 yof with a history of neurogenic bladder with chronic indwelling rico, recurrent UTIs, and kidney transplant (1999) who is admitted on 12/19 with sepsis from urinary source, intubated in the ED for altered mental status.       Neuro:  1. Analgesia and Sedation  2. Polyneuropathy 2/2 DM; Chronic Pain  3. Acute Encephalopathy -- ?2/2 acute infection  -Cont home Baclofen prn dosing  -Dose reduced Gabapentin to 300 mg BID (home dosing 900 mg BID)  -Cont. Home Trileptal 300 mg BID  - patient with history of intermittent episodes of altered mental status with UTIs  - 1 hr EEG performed 12/21 with no epileptiform activity  - ammonia normal   - mental status greatly improved on 12/21  - now with paranoid characteristics, distrustful of medical staff, depressed affect   - psych consult placed for assistance       Respiratory  1. Acute Hypoxic Respiratory failure, intubation for airway protection s/p extubation 12/22  - saturating well on RA      CV:  1. Sepsis from Urinary Source   2. Hypertension, labile BPs   - MAP goal > 65  - Cont home clonidine 0.1 mg BID  - add home lisinopril 125 BID   - PRN labetalol if HR and hydralazine as needed for SBP >180mmHg  - Cont. Home Pravastatin      Renal:  1. s/p Kidney txplt in 1999  2. Neurogenic bladder with chronic indwelling rico   -Renal consult placed given kidney transplant hx  -Cont. Tacrolimus 2 mg BID; nephrology will follow tacro trough --> may need to convert to IV if patient continues to refuse orals  -Continue Prednisone 5 mg daily --> may need to convert to IV if patient continues to refuse orals   -Patient is normotensive to  hypertensive -- will hold off on stress dose steroids at this time  -Hold Cinacalcet while NPO  -Monitor crt; renally dose meds; avoid nephrotoxins as able      Heme:  1. H/o DVT/PE  2. Anemia -- hgb stable  -Cont. ASA 81 mg  -monitor hgb; transfuse for < 7      ID:  1. Recurrent UTI  - UCX with no growth   - Meropenem  (12/19- ) Hx of ESBL  - vanc discontinued   -Johnson changed in ED 12/19  -Aerobic blood and fungal blood cx NGTD  - ID consult for assistance with antimicrobial selection       Endo:  1. Type 2 Diabetes w/ gastroparesis, retinopathy, and polyneuropathy  2. Hypothyroidism  -Continue home Levothyroxine  -Hold home Lantus while NPO  -SSI; glucose management per ICU protocol      FEN:  -  mIVF to 75cc/hr until patient taking PO  - patient refusing to take anything in orally currently   -Monitor Electrolytes  -Senna/Colace for Bowel regimen      Prophy:  GI:  H2 Blocker   DVT:  LMWH       ICU Cares:  Restrain needed: Yes   Lines needed: Yes; IVs, Johnson  Code: Full  Dispo: transfer to the floor      Barb Elaine    Interval History   Course reviewed. No acute events overnight. Able to be extubated yesterday afternoon, tolerated well. Now A+O x3, although intermittently refusing cares. She denies SOB, chest pain, abdominal pain.     Physical Exam   Temp: 98.2  F (36.8  C) Temp src: Axillary Temp  Min: 98.2  F (36.8  C)  Max: 98.6  F (37  C) BP: 153/71   Heart Rate: 89 Resp: 10 SpO2: 100 % O2 Device: None (Room air) Oxygen Delivery: 4 LPM  Vitals:    12/20/17 0400 12/21/17 0400 12/22/17 0600   Weight: 100.4 kg (221 lb 5.5 oz) 102.5 kg (225 lb 15.5 oz) 102.5 kg (225 lb 15.5 oz)     I/O last 3 completed shifts:  In: 1940 [I.V.:1900; NG/GT:40]  Out: 1850 [Urine:1850]    GEN: Resting in bed, appears comfortable  EYES: PERRL, Anicteric sclera.   HEENT:  Normocephalic, atraumatic, trachea midline, ETT secure  CV: RRR, no murmurs  PULM/CHEST: CTAB, no wheezes or rales   GI:  bowel sounds +, soft,  non-tender  EXTREMITIES: 1+ peripheral edema  NEURO: PERRL, does not follow commands   SKIN: warm and dry   PSYCH: paranoid on exam, distrustful, depressed affect   ECG- NSR      Medications     lactated ringers 75 mL/hr at 12/21/17 0910       labetalol  150 mg Oral BID     gabapentin  300 mg Oral BID     tacrolimus  2 mg Oral BID IS     predniSONE  5 mg Oral Daily     pravastatin  10 mg Oral Daily     OXcarbazepine  300 mg Oral BID     levothyroxine  50 mcg Oral Daily     aspirin  81 mg Oral Daily     cinacalcet  30 mg Oral Daily     cloNIDine  0.1 mg Oral BID     enoxaparin  40 mg Subcutaneous Q24H     miconazole   Topical BID     meropenem  1 g Intravenous Q8H     insulin aspart  1-6 Units Subcutaneous Q4H     famotidine  20 mg Oral BID       Data     Recent Labs  Lab 12/21/17  0452 12/20/17  0525 12/19/17  1602   WBC 8.9 6.0 6.9   HGB 11.8 11.4* 11.6*   MCV 95 93 95   * 135* 156    139 138   POTASSIUM 4.1 3.7 4.7   CHLORIDE 106 106 104   CO2 26 25 30   BUN 30 33* 38*   CR 1.11* 1.13* 1.21*   ANIONGAP 8 8 4   ROGER 8.1* 8.1* 8.5   GLC 83 123* 158*   ALBUMIN  --   --  3.0*   PROTTOTAL  --   --  7.7   BILITOTAL  --   --  0.4   ALKPHOS  --   --  83   ALT  --   --  21   AST  --   --  16     No results found for this or any previous visit (from the past 24 hour(s)).

## 2017-12-23 LAB
ANION GAP SERPL CALCULATED.3IONS-SCNC: 10 MMOL/L (ref 3–14)
BUN SERPL-MCNC: 29 MG/DL (ref 7–30)
CALCIUM SERPL-MCNC: 8.3 MG/DL (ref 8.5–10.1)
CHLORIDE SERPL-SCNC: 105 MMOL/L (ref 94–109)
CO2 SERPL-SCNC: 24 MMOL/L (ref 20–32)
CREAT SERPL-MCNC: 1.1 MG/DL (ref 0.52–1.04)
ERYTHROCYTE [DISTWIDTH] IN BLOOD BY AUTOMATED COUNT: 14.6 % (ref 10–15)
GFR SERPL CREATININE-BSD FRML MDRD: 49 ML/MIN/1.7M2
GLUCOSE BLDC GLUCOMTR-MCNC: 139 MG/DL (ref 70–99)
GLUCOSE BLDC GLUCOMTR-MCNC: 150 MG/DL (ref 70–99)
GLUCOSE BLDC GLUCOMTR-MCNC: 151 MG/DL (ref 70–99)
GLUCOSE BLDC GLUCOMTR-MCNC: 154 MG/DL (ref 70–99)
GLUCOSE BLDC GLUCOMTR-MCNC: 174 MG/DL (ref 70–99)
GLUCOSE BLDC GLUCOMTR-MCNC: 193 MG/DL (ref 70–99)
GLUCOSE SERPL-MCNC: 143 MG/DL (ref 70–99)
HCT VFR BLD AUTO: 32 % (ref 35–47)
HGB BLD-MCNC: 10.2 G/DL (ref 11.7–15.7)
MCH RBC QN AUTO: 30.4 PG (ref 26.5–33)
MCHC RBC AUTO-ENTMCNC: 31.9 G/DL (ref 31.5–36.5)
MCV RBC AUTO: 96 FL (ref 78–100)
PLATELET # BLD AUTO: 146 10E9/L (ref 150–450)
POTASSIUM SERPL-SCNC: 3.7 MMOL/L (ref 3.4–5.3)
RBC # BLD AUTO: 3.35 10E12/L (ref 3.8–5.2)
SODIUM SERPL-SCNC: 139 MMOL/L (ref 133–144)
WBC # BLD AUTO: 8.1 10E9/L (ref 4–11)

## 2017-12-23 PROCEDURE — A9270 NON-COVERED ITEM OR SERVICE: HCPCS | Mod: GY | Performed by: INTERNAL MEDICINE

## 2017-12-23 PROCEDURE — 25000132 ZZH RX MED GY IP 250 OP 250 PS 637: Mod: GY | Performed by: INTERNAL MEDICINE

## 2017-12-23 PROCEDURE — 99207 ZZC MOONLIGHTING INDICATOR: CPT | Performed by: INTERNAL MEDICINE

## 2017-12-23 PROCEDURE — 99233 SBSQ HOSP IP/OBS HIGH 50: CPT | Performed by: INTERNAL MEDICINE

## 2017-12-23 PROCEDURE — 25000128 H RX IP 250 OP 636: Performed by: PHYSICIAN ASSISTANT

## 2017-12-23 PROCEDURE — 25000128 H RX IP 250 OP 636: Performed by: INTERNAL MEDICINE

## 2017-12-23 PROCEDURE — 25000132 ZZH RX MED GY IP 250 OP 250 PS 637: Mod: GY | Performed by: PHYSICIAN ASSISTANT

## 2017-12-23 PROCEDURE — 85027 COMPLETE CBC AUTOMATED: CPT | Performed by: INTERNAL MEDICINE

## 2017-12-23 PROCEDURE — 25000131 ZZH RX MED GY IP 250 OP 636 PS 637: Mod: GY | Performed by: INTERNAL MEDICINE

## 2017-12-23 PROCEDURE — 12000000 ZZH R&B MED SURG/OB

## 2017-12-23 PROCEDURE — 00000146 ZZHCL STATISTIC GLUCOSE BY METER IP

## 2017-12-23 PROCEDURE — 80048 BASIC METABOLIC PNL TOTAL CA: CPT | Performed by: INTERNAL MEDICINE

## 2017-12-23 PROCEDURE — 36415 COLL VENOUS BLD VENIPUNCTURE: CPT | Performed by: INTERNAL MEDICINE

## 2017-12-23 PROCEDURE — A9270 NON-COVERED ITEM OR SERVICE: HCPCS | Mod: GY | Performed by: PHYSICIAN ASSISTANT

## 2017-12-23 RX ADMIN — GABAPENTIN 300 MG: 300 CAPSULE ORAL at 22:38

## 2017-12-23 RX ADMIN — OXCARBAZEPINE 300 MG: 300 TABLET, FILM COATED ORAL at 10:07

## 2017-12-23 RX ADMIN — MEROPENEM AND SODIUM CHLORIDE 1 G: 1 INJECTION, SOLUTION INTRAVENOUS at 10:11

## 2017-12-23 RX ADMIN — CLONIDINE HYDROCHLORIDE 0.1 MG: 0.1 TABLET ORAL at 22:38

## 2017-12-23 RX ADMIN — INSULIN ASPART 1 UNITS: 100 INJECTION, SOLUTION INTRAVENOUS; SUBCUTANEOUS at 18:51

## 2017-12-23 RX ADMIN — PRAVASTATIN SODIUM 10 MG: 10 TABLET ORAL at 08:57

## 2017-12-23 RX ADMIN — METHYLPREDNISOLONE SODIUM SUCCINATE 4 MG: 40 INJECTION, POWDER, FOR SOLUTION INTRAMUSCULAR; INTRAVENOUS at 11:00

## 2017-12-23 RX ADMIN — ENOXAPARIN SODIUM 40 MG: 40 INJECTION SUBCUTANEOUS at 22:39

## 2017-12-23 RX ADMIN — FAMOTIDINE 20 MG: 20 TABLET ORAL at 22:38

## 2017-12-23 RX ADMIN — MICONAZOLE NITRATE: 2 POWDER TOPICAL at 10:12

## 2017-12-23 RX ADMIN — FAMOTIDINE 20 MG: 20 TABLET ORAL at 08:57

## 2017-12-23 RX ADMIN — LEVOTHYROXINE SODIUM 50 MCG: 50 TABLET ORAL at 08:57

## 2017-12-23 RX ADMIN — MEROPENEM AND SODIUM CHLORIDE 1 G: 1 INJECTION, SOLUTION INTRAVENOUS at 18:51

## 2017-12-23 RX ADMIN — CINACALCET HYDROCHLORIDE 30 MG: 30 TABLET, COATED ORAL at 08:56

## 2017-12-23 RX ADMIN — MICONAZOLE NITRATE: 2 POWDER TOPICAL at 22:33

## 2017-12-23 RX ADMIN — OXCARBAZEPINE 300 MG: 300 TABLET, FILM COATED ORAL at 22:38

## 2017-12-23 RX ADMIN — MEROPENEM AND SODIUM CHLORIDE 1 G: 1 INJECTION, SOLUTION INTRAVENOUS at 02:35

## 2017-12-23 RX ADMIN — SODIUM CHLORIDE, POTASSIUM CHLORIDE, SODIUM LACTATE AND CALCIUM CHLORIDE: 600; 310; 30; 20 INJECTION, SOLUTION INTRAVENOUS at 21:29

## 2017-12-23 RX ADMIN — Medication 150 MG: at 22:33

## 2017-12-23 RX ADMIN — GABAPENTIN 300 MG: 300 CAPSULE ORAL at 08:57

## 2017-12-23 RX ADMIN — ASPIRIN 81 MG 81 MG: 81 TABLET ORAL at 08:57

## 2017-12-23 RX ADMIN — INSULIN ASPART 1 UNITS: 100 INJECTION, SOLUTION INTRAVENOUS; SUBCUTANEOUS at 04:15

## 2017-12-23 RX ADMIN — INSULIN ASPART 1 UNITS: 100 INJECTION, SOLUTION INTRAVENOUS; SUBCUTANEOUS at 12:07

## 2017-12-23 RX ADMIN — INSULIN ASPART 1 UNITS: 100 INJECTION, SOLUTION INTRAVENOUS; SUBCUTANEOUS at 00:57

## 2017-12-23 NOTE — PLAN OF CARE
Problem: Patient Care Overview  Goal: Plan of Care/Patient Progress Review  Outcome: No Change  VSS. Denies pain. LS clear, diminished in bases. BS present. Large, hard, formed BM noted x1 and incontinent. Johnson in place and patent. Repositioned every 2 hours. Compliant with meds and diet. Diet advanced to full liquids for AM. Plan to transition back to PO meds and advance diet as tolerated.

## 2017-12-23 NOTE — PLAN OF CARE
Problem: Patient Care Overview  Goal: Plan of Care/Patient Progress Review  Outcome: Improving  VSS. A&Ox4, Denies pain. LS clear, diminished in bases. BS present. Large, hard, formed BM noted x1 12/23noc. Johnson in place and patent. Repositioned every 2 hours. Compliant with meds and diet. Diet advanced to full liquids for AM. Plan to transition back to PO meds and advance diet as tolerated.  Cont iv abx. Creat 1.1

## 2017-12-23 NOTE — PROCEDURES
ONE-HOUR VIDEO ELECTROENCEPHALOGRAM WITH VIDEO      DATE OF SERVICE:  2017      EEG #  one-hour with video      ORDERING PHYSICIAN:  Luis Cortes MD      PATIENT:  Maryjane Jade      EEG demonstrated slow background activity of 5-6 Hz.  There are no epileptiform discharges and no electrographic seizures seen on this EEG.  No epileptiform discharges, no subclinical seizures seen.      IMPRESSION:  Encephalopathic EEG.  Clinical correlation required.         LUIS CORTES MD, PHD, YOUSIF             D: 2017 13:29   T: 2017 09:39   MT:       Name:     MARYJANE JADE   MRN:      [id number]        Account:        [id number]   :      1945           Procedure Date: 2017      Document: [id number]       cc: Luis Cortes MD, PhD, YOUSIF

## 2017-12-23 NOTE — PLAN OF CARE
Problem: Sepsis/Septic Shock (Adult)  Goal: Signs and Symptoms of Listed Potential Problems Will be Absent, Minimized or Managed (Sepsis/Septic Shock)  Signs and symptoms of listed potential problems will be absent, minimized or managed by discharge/transition of care (reference Sepsis/Septic Shock (Adult) CPG).   Outcome: Improving  A/o x 2, disoriented to situation and place.  Cognition and paranoia improving as the night goes on.  Patient Hypertensive early this shift, prn hydralazine given now resolved.  Lung sound diminished, on RA sats 96-97%.  Has rico drng clear yellow urine.  Remains on clear liquid diet, did eat jello, and sherbert, drank x 4 apple juice this shift.  Patient took all her meds this shift.  BGM done non Novolog needed for correction.  Has rash area to L panus and wound to coccyx with mepilex in place.  Has LR running at 75/hr with profraf Y sited to L forearm IV running at 2.8ml hr. Has PIV to L hand SL.  Patient bed bound at baseline.  Use 2A with lift, turn repo q2h.  Has had sitter in room, no restraints on this shift.

## 2017-12-23 NOTE — PROGRESS NOTES
Chart check.  Out of ICU after successful extubation, but pt is refusing lab draws and oral medications.  Continues good UO.  I am confident that renal function is stable.  Agree with IV route for immunosuppressive Rx.  No plan for checking Tacrolimus level until back on stable oral regimen.  I will continue to follow.    Robin Mejia MD  Nephrology; [facility]  [phone]

## 2017-12-23 NOTE — PROGRESS NOTES
[facility]    Infectious Disease Progress Note    Date of Service (when I saw the patient): 12/23/2017     Assessment & Plan   Gem Jade is a 72 year old female who was admitted on 12/19/2017.     Impression:  1.  A 72-year-old female well known to us from prior admissions, admitted with mental status changes, respiratory failure and probable sepsis, although not entirely clear.   2.  Acute respiratory failure, not clear pneumonia, probably related to sepsis.   3.  Recent urinary tract infection.  4.  Recurrent urinary tract infections, chronic Johnson catheter in place, has had vancomycin-resistant enterococcus and extended spectrum beta lactamase in the past.   5.  Diabetes mellitus.   6.  Prior renal transplant, on chronic immunosuppressant therapy without major immunosuppressing infections, renal function slightly abnormal, but stable.   7.  Sulfa allergy and listed quinolone allergy, quinolones were challenged in past successfully. Currently on Meropenem with UC showing no bacterial growth.          RECOMMENDATIONS:   1. Continue on Meropenem for now. Day 5 today. Given negative cultures, no leucocytosis or fevers or complaints.  will probably just do a short course in the hospital, no antibiotics for home when ready for discharge.      Signing off call if ques.     Paramjit Mckenzie MD    Interval History   Afebrile   Out of the ICU     Physical Exam   Temp: 98.5  F (36.9  C) Temp src: Oral BP: (!) 114/37 Pulse: 77 Heart Rate: 88 Resp: 16 SpO2: 94 % O2 Device: None (Room air) Oxygen Delivery: 4 LPM  Vitals:    12/21/17 0400 12/22/17 0600 12/23/17 0618   Weight: 102.5 kg (225 lb 15.5 oz) 102.5 kg (225 lb 15.5 oz) 108 kg (238 lb)     Vital Signs with Ranges  Temp:  [96.9  F (36.1  C)-98.7  F (37.1  C)] 98.5  F (36.9  C)  Pulse:  [77] 77  Heart Rate:  [80-88] 88  Resp:  [8-16] 16  BP: (107-181)/(36-87) 114/37  SpO2:  [94 %-100 %] 94 %    Constitutional: Awake, alert, cooperative, no apparent  distress  Lungs: Clear to auscultation bilaterally, no crackles or wheezing  Cardiovascular: Regular rate and rhythm, normal S1 and S2, and no murmur noted  Abdomen: Normal bowel sounds, soft, non-distended, non-tender  Skin: No rashes, no cyanosis, no edema  Other:    Medications     tacrolimus (Prograf) infusion ADULT 55 mcg/hr (12/22/17 1345)     lactated ringers 75 mL/hr at 12/22/17 1828       methylPREDNISolone  4 mg Intravenous Q24H     labetalol  150 mg Oral BID     gabapentin  300 mg Oral BID     pravastatin  10 mg Oral Daily     OXcarbazepine  300 mg Oral BID     levothyroxine  50 mcg Oral Daily     aspirin  81 mg Oral Daily     cinacalcet  30 mg Oral Daily     cloNIDine  0.1 mg Oral BID     enoxaparin  40 mg Subcutaneous Q24H     miconazole   Topical BID     meropenem  1 g Intravenous Q8H     insulin aspart  1-6 Units Subcutaneous Q4H     famotidine  20 mg Oral BID       Data   All microbiology laboratory data reviewed.  Recent Labs   Lab Test  12/23/17   0655  12/21/17   0452  12/20/17   0525   WBC  8.1  8.9  6.0   HGB  10.2*  11.8  11.4*   HCT  32.0*  37.1  34.8*   MCV  96  95  93   PLT  146*  127*  135*     Recent Labs   Lab Test  12/23/17   0655  12/21/17   0452  12/20/17   0525   CR  1.10*  1.11*  1.13*     Recent Labs   Lab Test  05/25/16   2116   SED  53*     Recent Labs   Lab Test  12/19/17   2233  12/19/17   1900  12/19/17   1620  12/19/17   1619  12/19/17   1602  11/13/17   1714  11/13/17   1646  11/13/17   1613  11/06/17   1802   CULT  No MRSA isolated  No growth after 3 days  No growth  No growth after 4 days  No growth after 4 days  No growth  >100,000 colonies/mL  Candida albicans / dubliniensis  Candida albicans and Candida dubliniensis are not routinely speciated  Susceptibility testing not routinely done  *  No growth  >100,000 colonies/mL  Citrobacter freundii complex  *

## 2017-12-23 NOTE — PROGRESS NOTES
[facility]    Hospitalist Progress Note    Assessment & Plan   Gem Jade is a 72 year old female who was admitted on 12/19/2017 for evaluation and management of altered mental status. She was intubated for airway protection. Work-up was notable for concern of sepsis secondary to urinary tract infection; Vancomycin and Meropenem IV were initiated. Her vital signs stabilized and she did not require vasopressor support. She was successfully extubated on 12/21/2017 and is currently not requiring oxygen. Vancomycin was discontinued on 12/21/2017. Post-extubation she has been alert and oriented, but paranoid of staff. She is currently refusing food and oral medications. Tacrolimus and steroid dosing is being converted to IV. Blood and urine cultures have been negative-to-date. Infectious Disease has been consulted for multiple similar admissions and prior history of VRE, ESBL, and C. Dif. She is status-post renal transplant in 1999    Sepsis, secondary to urinary tract infection, resolving  Chronic urinary tract infection  Neurogenic bladder, requiring chronic indwelling Johnson  Personal history of klebsiella, ESBL, VRE, and citrobacter freundii complex UTI  Personal history of candiduria, November 2017  Mrs. Jade has had frequent episodes of altered mental status secondary to sepsis caused by urinary tract infection. She was recently admitted in November 2017 for same here and [facility]. During her previous admission at [facility] ID considered long-term antibiotic, however elected to not use long-term therapy secondary to history of C.Dif. She continues on Meropenem 1- gram every 8-hours. She received Vancomycin from 12/19/2017-12/21/2017. Johnson was changed in ED on 12/19/2017. By report her  changes indwelling Johnson at home and has not been using sterile technique. Transferred out of ICU 12/22. Ur/blood cx NTD. Has remained afebrile  - continue Meropenem while in hospital per ID--no antbx on  discharge.    Acute hypoxic respiratory failure, secondary to sepsis, resolving  Mrs. Jade was intubated in the ED on 12/19/2017 for airway protection and successfully extubated on 12/21/2017. She is currently requiring minimal to no oxygen.   Resolved    Status post renal transplant, 1999, immunosuppressed  Chronic renal failure, GFR 48  Nephrology has been consulted. Last tacrolimus level 3.8 on 12/11/2017. She has been refusing oral medications; steroids and tacrolimus has been transitioned to IV dosing. She has remained normo-hypertensive and was not given stress-dose steroids. UOP > 1- liter/day.  Appreciate Nephrology input  -continue  tacrolimus and steroids IV as ordered  - hold Cinacalcet if not eating  - avoid nephrotoxic agents, renal dosing of medications as appropriate  - continue gentle hydration of NS at 75- cc/hour  - avoid hypotension    Acute encephalopathy secondary to sepsis, improving  Head CT on 12/19/2017 without acute pathology. EEG on 12/21/2017 without concern for seizures. Ammonia level is normal. - appreciate Psychiatric consult   - avoid additional opioids and sedating medication  - discontinued Haldol; okay to use Zyprexa for agitation   - continue home baclofen PRN  - continue gabapentin, dose reduces 12/21/2017 to 300- mg BID from 900- mg BID    Hypertension, treated  Hyperlipidemia, treated  Chronic, stable. Has remained normo-hypertensive and did not require vasopressor support with sepsis. Home medications have been resumed.  Continue meds    Personal history of deep vein thrombosis and pulmonary embolism  Mrs. Jade is not on chronic anticoagulation. Bilateral calves are soft and non-tender, no hypoxia.  - prophylactic Lovenox, SCDs    Diabetes mellitus, type II, hemoglobin A1c 6.7%, 12/19/2017, complicated by gastroparesis, retinopathy, and polyneuropathy  Chronic, stable. Lantus has been held since admission with finger sticks <150  -continue  medium correction scale    Anemia of  chronic disease, range 10.6-11.8  Chronic, stable. No obvious signs of blood loss.    Thrombocytopenia likely secondary to immunosuppression and acute illness   Per EMR review typically low-normal. Has received Heparin, no concern for HIT at this time. No obvious signs of blood loss.     Impaired physical mobility and debility, chronic, secondary to polyneuropathy, vertigo, and Charcot foot   Per EMR review is bedridden with severely limited mobility. Her  is her primary caretaker and she lives locally in her own home.   - continue PT/OT consults     DVT Prophylaxis: Enoxaparin (Lovenox) SQ and Pneumatic Compression Devices  Code Status: Full Code    Disposition: will be in hospital 1-2 days--depending on po intake.    Physical Exam   Temp: 99.6  F (37.6  C) Temp src: Oral BP: 145/52 Pulse: 77 Heart Rate: 86 Resp: 16 SpO2: 97 % O2 Device: None (Room air) Oxygen Delivery: 4 LPM  Vitals:    12/21/17 0400 12/22/17 0600 12/23/17 0618   Weight: 102.5 kg (225 lb 15.5 oz) 102.5 kg (225 lb 15.5 oz) 108 kg (238 lb)     Vital Signs with Ranges  Temp:  [96.9  F (36.1  C)-99.6  F (37.6  C)] 99.6  F (37.6  C)  Pulse:  [77] 77  Heart Rate:  [81-88] 86  Resp:  [14-16] 16  BP: (114-180)/(36-78) 145/52  SpO2:  [94 %-100 %] 97 %  I/O last 3 completed shifts:  In: 1020 [P.O.:420; I.V.:600]  Out: 200 [Urine:200]    General: Appears stated age, no acute distress.  HEENT:  Normocephalic, atraumatic.  Chest:  Bilateral anterior and lateral lung fields clear to auscultation. No increased work of breathing. Does require supplemental oxygen.  Cardiovascular:  Regular rate and rhythm, without murmur, rub, or gallop. LE edema 2-3+B   Abdomen:  Soft, non-tender, non-distended. Bowel sounds present.   Musculoskeletal:  Moves all four extremities.   Neurological:  Alert and oriented x 3, confused to day-of-the-week. Cranial nerves II-XII grossly intact.     Medications     tacrolimus (Prograf) infusion ADULT 55 mcg/hr (12/22/17 4915)      lactated ringers 75 mL/hr at 12/22/17 1828       methylPREDNISolone  4 mg Intravenous Q24H     labetalol  150 mg Oral BID     gabapentin  300 mg Oral BID     pravastatin  10 mg Oral Daily     OXcarbazepine  300 mg Oral BID     levothyroxine  50 mcg Oral Daily     aspirin  81 mg Oral Daily     cinacalcet  30 mg Oral Daily     cloNIDine  0.1 mg Oral BID     enoxaparin  40 mg Subcutaneous Q24H     miconazole   Topical BID     meropenem  1 g Intravenous Q8H     insulin aspart  1-6 Units Subcutaneous Q4H     famotidine  20 mg Oral BID       Data     Recent Labs  Lab 12/23/17  0655 12/21/17  0452 12/20/17  0525 12/19/17  1602   WBC 8.1 8.9 6.0 6.9   HGB 10.2* 11.8 11.4* 11.6*   MCV 96 95 93 95   * 127* 135* 156    140 139 138   POTASSIUM 3.7 4.1 3.7 4.7   CHLORIDE 105 106 106 104   CO2 24 26 25 30   BUN 29 30 33* 38*   CR 1.10* 1.11* 1.13* 1.21*   ANIONGAP 10 8 8 4   ROGER 8.3* 8.1* 8.1* 8.5   * 83 123* 158*   ALBUMIN  --   --   --  3.0*   PROTTOTAL  --   --   --  7.7   BILITOTAL  --   --   --  0.4   ALKPHOS  --   --   --  83   ALT  --   --   --  21   AST  --   --   --  16

## 2017-12-23 NOTE — PLAN OF CARE
"Problem: Patient Care Overview  Goal: Plan of Care/Patient Progress Review  PT: PT evaluated pt yesterday but pt not willing to provide any information regarding PLOF.  Per extensive chart review from several previous admissions, pt is \"dependent on spouse for all cares, bed bound, lives at home\"; chart review also reveals that stretcher transport has been set up the last several discharges.  At this time, no acute PT needs identified as she is at/near baseline.  Will sign off.      Physical Therapy Discharge Summary    Reason for therapy discharge:    No further expectations of functional progress.    Progress towards therapy goal(s). See goals on Care Plan in [facility] electronic health record for goal details.  After chart review, determined pt is dependent for all cares and bed bound with spouse being primary caregiver.    Therapy recommendation(s):    No further therapy is recommended.        "

## 2017-12-23 NOTE — PLAN OF CARE
OT: Per PT note, pt is dependent on  for all cares and is bed bound. No inpatient needs identified at this time. Orders completed. If needs change, please notify.

## 2017-12-24 LAB
ANION GAP SERPL CALCULATED.3IONS-SCNC: 6 MMOL/L (ref 3–14)
BUN SERPL-MCNC: 29 MG/DL (ref 7–30)
CALCIUM SERPL-MCNC: 7.9 MG/DL (ref 8.5–10.1)
CHLORIDE SERPL-SCNC: 106 MMOL/L (ref 94–109)
CO2 SERPL-SCNC: 26 MMOL/L (ref 20–32)
CREAT SERPL-MCNC: 1.29 MG/DL (ref 0.52–1.04)
ERYTHROCYTE [DISTWIDTH] IN BLOOD BY AUTOMATED COUNT: 14.4 % (ref 10–15)
GFR SERPL CREATININE-BSD FRML MDRD: 41 ML/MIN/1.7M2
GLUCOSE BLDC GLUCOMTR-MCNC: 145 MG/DL (ref 70–99)
GLUCOSE BLDC GLUCOMTR-MCNC: 177 MG/DL (ref 70–99)
GLUCOSE BLDC GLUCOMTR-MCNC: 184 MG/DL (ref 70–99)
GLUCOSE BLDC GLUCOMTR-MCNC: 200 MG/DL (ref 70–99)
GLUCOSE BLDC GLUCOMTR-MCNC: 261 MG/DL (ref 70–99)
GLUCOSE SERPL-MCNC: 161 MG/DL (ref 70–99)
HCT VFR BLD AUTO: 31 % (ref 35–47)
HGB BLD-MCNC: 9.6 G/DL (ref 11.7–15.7)
MCH RBC QN AUTO: 29.7 PG (ref 26.5–33)
MCHC RBC AUTO-ENTMCNC: 31 G/DL (ref 31.5–36.5)
MCV RBC AUTO: 96 FL (ref 78–100)
PLATELET # BLD AUTO: 136 10E9/L (ref 150–450)
POTASSIUM SERPL-SCNC: 3.8 MMOL/L (ref 3.4–5.3)
RBC # BLD AUTO: 3.23 10E12/L (ref 3.8–5.2)
SODIUM SERPL-SCNC: 138 MMOL/L (ref 133–144)
WBC # BLD AUTO: 7.4 10E9/L (ref 4–11)

## 2017-12-24 PROCEDURE — 85027 COMPLETE CBC AUTOMATED: CPT | Performed by: INTERNAL MEDICINE

## 2017-12-24 PROCEDURE — 25000132 ZZH RX MED GY IP 250 OP 250 PS 637: Mod: GY | Performed by: PHYSICIAN ASSISTANT

## 2017-12-24 PROCEDURE — 36415 COLL VENOUS BLD VENIPUNCTURE: CPT | Performed by: INTERNAL MEDICINE

## 2017-12-24 PROCEDURE — 99233 SBSQ HOSP IP/OBS HIGH 50: CPT | Performed by: INTERNAL MEDICINE

## 2017-12-24 PROCEDURE — 99207 ZZC MOONLIGHTING INDICATOR: CPT | Performed by: INTERNAL MEDICINE

## 2017-12-24 PROCEDURE — A9270 NON-COVERED ITEM OR SERVICE: HCPCS | Mod: GY | Performed by: PHYSICIAN ASSISTANT

## 2017-12-24 PROCEDURE — 25000128 H RX IP 250 OP 636: Performed by: PHYSICIAN ASSISTANT

## 2017-12-24 PROCEDURE — A9270 NON-COVERED ITEM OR SERVICE: HCPCS | Mod: GY | Performed by: INTERNAL MEDICINE

## 2017-12-24 PROCEDURE — 12000000 ZZH R&B MED SURG/OB

## 2017-12-24 PROCEDURE — 25000132 ZZH RX MED GY IP 250 OP 250 PS 637: Mod: GY | Performed by: INTERNAL MEDICINE

## 2017-12-24 PROCEDURE — 25000131 ZZH RX MED GY IP 250 OP 636 PS 637: Mod: GY | Performed by: INTERNAL MEDICINE

## 2017-12-24 PROCEDURE — 25000128 H RX IP 250 OP 636: Performed by: INTERNAL MEDICINE

## 2017-12-24 PROCEDURE — 00000146 ZZHCL STATISTIC GLUCOSE BY METER IP

## 2017-12-24 PROCEDURE — 80048 BASIC METABOLIC PNL TOTAL CA: CPT | Performed by: INTERNAL MEDICINE

## 2017-12-24 RX ORDER — DIPHENHYDRAMINE HCL 25 MG
25 CAPSULE ORAL ONCE
Status: COMPLETED | OUTPATIENT
Start: 2017-12-24 | End: 2017-12-24

## 2017-12-24 RX ORDER — GABAPENTIN 300 MG/1
900 CAPSULE ORAL 2 TIMES DAILY
Status: DISCONTINUED | OUTPATIENT
Start: 2017-12-24 | End: 2017-12-25 | Stop reason: HOSPADM

## 2017-12-24 RX ORDER — PREDNISONE 5 MG/1
5 TABLET ORAL DAILY
Status: DISCONTINUED | OUTPATIENT
Start: 2017-12-24 | End: 2017-12-25 | Stop reason: HOSPADM

## 2017-12-24 RX ADMIN — ASPIRIN 81 MG 81 MG: 81 TABLET ORAL at 09:37

## 2017-12-24 RX ADMIN — ENOXAPARIN SODIUM 40 MG: 40 INJECTION SUBCUTANEOUS at 22:43

## 2017-12-24 RX ADMIN — LEVOTHYROXINE SODIUM 50 MCG: 50 TABLET ORAL at 09:36

## 2017-12-24 RX ADMIN — MICONAZOLE NITRATE: 2 POWDER TOPICAL at 09:41

## 2017-12-24 RX ADMIN — OXCARBAZEPINE 300 MG: 300 TABLET, FILM COATED ORAL at 22:46

## 2017-12-24 RX ADMIN — METHYLPREDNISOLONE SODIUM SUCCINATE 4 MG: 40 INJECTION, POWDER, FOR SOLUTION INTRAMUSCULAR; INTRAVENOUS at 12:28

## 2017-12-24 RX ADMIN — INSULIN GLARGINE 12 UNITS: 100 INJECTION, SOLUTION SUBCUTANEOUS at 22:49

## 2017-12-24 RX ADMIN — OXCARBAZEPINE 300 MG: 300 TABLET, FILM COATED ORAL at 09:37

## 2017-12-24 RX ADMIN — CINACALCET HYDROCHLORIDE 30 MG: 30 TABLET, COATED ORAL at 09:36

## 2017-12-24 RX ADMIN — DIPHENHYDRAMINE HYDROCHLORIDE 25 MG: 25 CAPSULE ORAL at 12:28

## 2017-12-24 RX ADMIN — CLONIDINE HYDROCHLORIDE 0.1 MG: 0.1 TABLET ORAL at 12:27

## 2017-12-24 RX ADMIN — SODIUM CHLORIDE, POTASSIUM CHLORIDE, SODIUM LACTATE AND CALCIUM CHLORIDE: 600; 310; 30; 20 INJECTION, SOLUTION INTRAVENOUS at 10:19

## 2017-12-24 RX ADMIN — GABAPENTIN 900 MG: 300 CAPSULE ORAL at 22:45

## 2017-12-24 RX ADMIN — TACROLIMUS 2 MG: 5 CAPSULE ORAL at 22:52

## 2017-12-24 RX ADMIN — Medication 150 MG: at 12:26

## 2017-12-24 RX ADMIN — CLONIDINE HYDROCHLORIDE 0.1 MG: 0.1 TABLET ORAL at 22:46

## 2017-12-24 RX ADMIN — FAMOTIDINE 20 MG: 20 TABLET ORAL at 09:37

## 2017-12-24 RX ADMIN — MEROPENEM AND SODIUM CHLORIDE 1 G: 1 INJECTION, SOLUTION INTRAVENOUS at 18:49

## 2017-12-24 RX ADMIN — MEROPENEM AND SODIUM CHLORIDE 1 G: 1 INJECTION, SOLUTION INTRAVENOUS at 03:00

## 2017-12-24 RX ADMIN — Medication 150 MG: at 22:45

## 2017-12-24 RX ADMIN — PRAVASTATIN SODIUM 10 MG: 10 TABLET ORAL at 09:36

## 2017-12-24 RX ADMIN — MICONAZOLE NITRATE: 2 POWDER TOPICAL at 22:52

## 2017-12-24 RX ADMIN — GABAPENTIN 300 MG: 300 CAPSULE ORAL at 09:36

## 2017-12-24 RX ADMIN — FAMOTIDINE 20 MG: 20 TABLET ORAL at 22:46

## 2017-12-24 RX ADMIN — MEROPENEM AND SODIUM CHLORIDE 1 G: 1 INJECTION, SOLUTION INTRAVENOUS at 09:39

## 2017-12-24 NOTE — PROGRESS NOTES
"Care Coordination:    In AM rounds, MD contributed that patient could discharge today.  I stated pt does have home care services, and with the Christmas holiday I would like to establish when the earliest they could come out to resume care, before planning on discharge.  Therapy notes indicate that because pt is totally dependent on spouse at home, no skilled PT/OT needs are identified, home is an appropriate disposition.  There is a note from HC liaison* quoting an email, \"HC RN w/ concerns about pt not doing well at home. Hopes pt will agree to TCU at discharge.\"  We agreed best course of action would be to reach out to pt's spouse and see if he is comfortable taking pt home today.    Called spouse.  He has a number of concerns 1) ICU didn't tell him pt was septic, 2) he didn't get updated that pt transferred to a medical floor, 3) no doctors have updated him, and 4) when I mention Home Care asked if TCU should be considered... he swore and said \"our home care nurses would NEVER say something like that!\" He is adamant that she NEVER go to TCU due to, among other things, \"last time there were holes in the screen and flies coming in the room.\" FYI. He wants to know who from Home Care said that. He was upset that I called him when \"my wife didn't know who I was 2 days ago,\" \"I was trying to move her downstairs from upstairs, and was told that could be put on hold because she wouldn't be coming home until after Christmas,\" and that \"WTF is this, some stranger calls and asks if I am comfortable taking her home? F**. No offense, you're very nice. F**\"       Requested MD call him now. I reinforced that our therapies and staff are NOT recommending TCU, that home with resumed services is OK. MD states pt's spouse was more calm for her, and that his concern was getting a bed down from the second level.  Pt likely to d/c today.      Pt's spouse then called bedside RN for lengthy update.  Will continue to follow for " "disposition planning.    Shannon Gu RN, BSN  [facility] Care Coordinator   Mobile Phone: [phone]    *Full HC Liaison sticky note was, \"RN Liaison received email from HC RN w/ concerns about pt not doing well at home. Hopes pt will agree to palliative and/or hospice consult and/or TCU at discharge. Informed her that d/c plan is pending but would pass this info along.\"  This is NOT what pt's spouse wants.   "

## 2017-12-24 NOTE — PLAN OF CARE
Problem: Patient Care Overview  Goal: Plan of Care/Patient Progress Review  Outcome: No Change  VSS. A&Ox4. Denies pain. LS clear, diminished in bases. BS present. Johnson in place and patent. Repositioned every 2 hours. Compliant with meds and diet. Diet advanced to regular last evening, tolerating well. BGM changed to QID with SSI.

## 2017-12-24 NOTE — PROGRESS NOTES
Pt calm and lucid during my visit.  Seems likely she can resume oral transplant meds soon.  Chemistries look good and UO fine.    Robin Mejia MD  Nephrology; [facility]  [phone]

## 2017-12-24 NOTE — PLAN OF CARE
Problem: Patient Care Overview  Goal: Plan of Care/Patient Progress Review  Outcome: Improving  VSS. A&Ox4. Denies pain. LS clear, diminished in bases. BS present. Johnson in place and patent. Repositioned every 2 hours. Compliant with meds and diet. Diet advanced to regular last evening, tolerating well. BGM changed to QID with SSI. Likely d/c to home tmr,  aware. IVF d/c'd, ryan PO well.

## 2017-12-24 NOTE — PROGRESS NOTES
[facility]    Hospitalist Progress Note    Assessment & Plan   Gem Jade is a 72 year old female who was admitted on 12/19/2017 for evaluation and management of altered mental status. She was intubated for airway protection. Work-up was notable for concern of sepsis secondary to urinary tract infection; Vancomycin and Meropenem IV were initiated. Her vital signs stabilized and she did not require vasopressor support. She was successfully extubated on 12/21/2017 and is currently not requiring oxygen. Vancomycin was discontinued on 12/21/2017. Post-extubation she has been alert and oriented, but paranoid of staff. She is currently refusing food and oral medications. Tacrolimus and steroid dosing is being converted to IV. Blood and urine cultures have been negative-to-date. Infectious Disease has been consulted for multiple similar admissions and prior history of VRE, ESBL, and C. Dif. She is status-post renal transplant in 1999    Sepsis, secondary to urinary tract infection, resolving  Chronic urinary tract infection  Neurogenic bladder, requiring chronic indwelling Johnson  Personal history of klebsiella, ESBL, VRE, and citrobacter freundii complex UTI  Personal history of candiduria, November 2017  Mrs. Jade has had frequent episodes of altered mental status secondary to sepsis caused by urinary tract infection. She was recently admitted in November 2017 for same here and [facility]. During her previous admission at [facility] ID considered long-term antibiotic, however elected to not use long-term therapy secondary to history of C.Dif. She continues on Meropenem 1- gram every 8-hours. She received Vancomycin from 12/19/2017-12/21/2017. Johnson was changed in ED on 12/19/2017. By report her  changes indwelling Johnson at home and has not been using sterile technique. Transferred out of ICU 12/22. Ur/blood cx NTD. Has remained afebrile  - continue Meropenem while in hospital per ID--no antbx on  discharge.    Acute hypoxic respiratory failure, secondary to sepsis, resolving  Mrs. Jade was intubated in the ED on 12/19/2017 for airway protection and successfully extubated on 12/21/2017. She is currently requiring minimal to no oxygen.   Resolved    Status post renal transplant, 1999, immunosuppressed  Chronic renal failure, GFR 48  Nephrology has been consulted. Last tacrolimus level 3.8 on 12/11/2017. She has been refusing oral medications; steroids and tacrolimus has been transitioned to IV dosing. She has remained normo-hypertensive and was not given stress-dose steroids. UOP > 1- liter/day.  Appreciate Nephrology input  -continue  tacrolimus / steroids--change to po today.     Acute encephalopathy secondary to sepsis, improving  Head CT on 12/19/2017 without acute pathology. EEG on 12/21/2017 without concern for seizures. Ammonia level is normal. - appreciate Psychiatric consult   - avoid additional opioids and sedating medication  - discontinued Haldol; okay to use Zyprexa for agitation   - continue home baclofen PRN  - continue gabapentin,resume 900- mg BID--pt requests --this is her home dose.     Hypertension, treated  Hyperlipidemia, treated  Chronic, stable.   -Continue meds    Personal history of deep vein thrombosis and pulmonary embolism  Mrs. Jade is not on chronic anticoagulation. Bilateral calves are soft and non-tender, no hypoxia.  - prophylactic Lovenox, SCDs    Diabetes mellitus, type II, hemoglobin A1c 6.7%, 12/19/2017, complicated by gastroparesis, retinopathy, and polyneuropathy  Chronic, stable. Lantus has been held since admission with finger sticks 160-170. Pt is eating now.   -continue  medium correction scale. Resume lantus at 1/2 dose BS are still <200    Anemia of chronic disease, range 10.6-11.8  Chronic, stable. No obvious signs of blood loss.    Thrombocytopenia likely secondary to immunosuppression and acute illness   Per EMR review typically low-normal. Has received Heparin,  no concern for HIT at this time. No obvious signs of blood loss.     Impaired physical mobility and debility, chronic, secondary to polyneuropathy, vertigo, and Charcot foot   Per EMR review is bedridden with severely limited mobility. Her  is her primary caretaker and she lives locally in her own home.   - continue PT/OT consults     DVT Prophylaxis: Enoxaparin (Lovenox) SQ and Pneumatic Compression Devices  Code Status: Full Code    Disposition: discussed at length with . He is not comfortable taking her home today--needs bed moved from upstairs to downstairs. Also HC services unavailable today--pt is total cares.   Physical Exam   Temp: 97.4  F (36.3  C) Temp src: Oral BP: 186/71 Pulse: 77 Heart Rate: 76 Resp: 16 SpO2: 92 % O2 Device: None (Room air)    Vitals:    12/21/17 0400 12/22/17 0600 12/23/17 0618   Weight: 102.5 kg (225 lb 15.5 oz) 102.5 kg (225 lb 15.5 oz) 108 kg (238 lb)     Vital Signs with Ranges  Temp:  [97.4  F (36.3  C)-99.1  F (37.3  C)] 97.4  F (36.3  C)  Pulse:  [77-83] 77  Heart Rate:  [76-89] 76  Resp:  [16] 16  BP: (137-186)/(46-71) 186/71  SpO2:  [92 %-96 %] 92 %  I/O last 3 completed shifts:  In: 600 [I.V.:600]  Out: -     General: Appears stated age, no acute distress.  HEENT:  Normocephalic, atraumatic.  Chest:  Bilateral anterior and lateral lung fields clear to auscultation. No increased work of breathing. Does require supplemental oxygen.  Cardiovascular:  Regular rate and rhythm, without murmur, rub, or gallop. LE edema 2-3+B   Abdomen:  Soft, non-tender, non-distended. Bowel sounds present.   Musculoskeletal:  Moves all four extremities.   Neurological:  Alert and oriented x 3,  Cranial nerves II-XII grossly intact.     Medications     lactated ringers 75 mL/hr at 12/24/17 1019       tacrolimus  2 mg Oral BID     gabapentin  900 mg Oral BID     insulin aspart  1-7 Units Subcutaneous TID AC     insulin aspart  1-5 Units Subcutaneous At Bedtime     methylPREDNISolone  4  mg Intravenous Q24H     labetalol  150 mg Oral BID     pravastatin  10 mg Oral Daily     OXcarbazepine  300 mg Oral BID     levothyroxine  50 mcg Oral Daily     aspirin  81 mg Oral Daily     cinacalcet  30 mg Oral Daily     cloNIDine  0.1 mg Oral BID     enoxaparin  40 mg Subcutaneous Q24H     miconazole   Topical BID     meropenem  1 g Intravenous Q8H     famotidine  20 mg Oral BID       Data     Recent Labs  Lab 12/24/17  0605 12/23/17  0655 12/21/17  0452  12/19/17  1602   WBC 7.4 8.1 8.9  < > 6.9   HGB 9.6* 10.2* 11.8  < > 11.6*   MCV 96 96 95  < > 95   * 146* 127*  < > 156    139 140  < > 138   POTASSIUM 3.8 3.7 4.1  < > 4.7   CHLORIDE 106 105 106  < > 104   CO2 26 24 26  < > 30   BUN 29 29 30  < > 38*   CR 1.29* 1.10* 1.11*  < > 1.21*   ANIONGAP 6 10 8  < > 4   ROGER 7.9* 8.3* 8.1*  < > 8.5   * 143* 83  < > 158*   ALBUMIN  --   --   --   --  3.0*   PROTTOTAL  --   --   --   --  7.7   BILITOTAL  --   --   --   --  0.4   ALKPHOS  --   --   --   --  83   ALT  --   --   --   --  21   AST  --   --   --   --  16   < > = values in this interval not displayed.

## 2017-12-24 NOTE — PROGRESS NOTES
Again today calm and seemingly lucid.  Agree with resuming oral immunosuppressive Rx.  Watch chemistries Qday while here.  Sl higher Cr today, others stable.  Current Cr about at baseline.    Robin Mejia MD  Nephrology; [employer] [employer]  [phone]

## 2017-12-24 NOTE — PLAN OF CARE
Problem: Sepsis/Septic Shock (Adult)  Goal: Signs and Symptoms of Listed Potential Problems Will be Absent, Minimized or Managed (Sepsis/Septic Shock)  Signs and symptoms of listed potential problems will be absent, minimized or managed by discharge/transition of care (reference Sepsis/Septic Shock (Adult) CPG).   Outcome: Improving  VSS. A&Ox4, does state that she is seeing bugs on ceiling but easily redirectable.  Denies pain. LS clear, diminished in bases. BS present. Large, soft BM this shift. Johnson in place and patent. Repositioned every 2 hours. Compliant with meds and diet. Diet advanced to regular this shift.

## 2017-12-25 VITALS
TEMPERATURE: 97.3 F | HEIGHT: 66 IN | OXYGEN SATURATION: 97 % | DIASTOLIC BLOOD PRESSURE: 51 MMHG | HEART RATE: 75 BPM | RESPIRATION RATE: 16 BRPM | WEIGHT: 238 LBS | SYSTOLIC BLOOD PRESSURE: 134 MMHG | BODY MASS INDEX: 38.25 KG/M2

## 2017-12-25 LAB
BACTERIA SPEC CULT: NO GROWTH
BACTERIA SPEC CULT: NO GROWTH
GLUCOSE BLDC GLUCOMTR-MCNC: 169 MG/DL (ref 70–99)
GLUCOSE BLDC GLUCOMTR-MCNC: 192 MG/DL (ref 70–99)
GLUCOSE BLDC GLUCOMTR-MCNC: 230 MG/DL (ref 70–99)
Lab: NORMAL
Lab: NORMAL
SPECIMEN SOURCE: NORMAL
SPECIMEN SOURCE: NORMAL

## 2017-12-25 PROCEDURE — 25000132 ZZH RX MED GY IP 250 OP 250 PS 637: Mod: GY | Performed by: PHYSICIAN ASSISTANT

## 2017-12-25 PROCEDURE — A9270 NON-COVERED ITEM OR SERVICE: HCPCS | Mod: GY | Performed by: INTERNAL MEDICINE

## 2017-12-25 PROCEDURE — 25000132 ZZH RX MED GY IP 250 OP 250 PS 637: Mod: GY | Performed by: INTERNAL MEDICINE

## 2017-12-25 PROCEDURE — 25000125 ZZHC RX 250: Performed by: INTERNAL MEDICINE

## 2017-12-25 PROCEDURE — 25000128 H RX IP 250 OP 636: Performed by: INTERNAL MEDICINE

## 2017-12-25 PROCEDURE — A9270 NON-COVERED ITEM OR SERVICE: HCPCS | Mod: GY | Performed by: PHYSICIAN ASSISTANT

## 2017-12-25 PROCEDURE — 99239 HOSP IP/OBS DSCHRG MGMT >30: CPT | Performed by: INTERNAL MEDICINE

## 2017-12-25 PROCEDURE — 00000146 ZZHCL STATISTIC GLUCOSE BY METER IP

## 2017-12-25 PROCEDURE — 25000131 ZZH RX MED GY IP 250 OP 636 PS 637: Mod: GY | Performed by: INTERNAL MEDICINE

## 2017-12-25 RX ORDER — TACROLIMUS 1 MG/1
2 CAPSULE ORAL 2 TIMES DAILY
Start: 2017-12-25 | End: 2017-12-26

## 2017-12-25 RX ADMIN — OXCARBAZEPINE 300 MG: 300 TABLET, FILM COATED ORAL at 09:41

## 2017-12-25 RX ADMIN — CINACALCET HYDROCHLORIDE 30 MG: 30 TABLET, COATED ORAL at 09:40

## 2017-12-25 RX ADMIN — FAMOTIDINE 20 MG: 20 TABLET ORAL at 09:40

## 2017-12-25 RX ADMIN — MEROPENEM AND SODIUM CHLORIDE 1 G: 1 INJECTION, SOLUTION INTRAVENOUS at 09:41

## 2017-12-25 RX ADMIN — MEROPENEM AND SODIUM CHLORIDE 1 G: 1 INJECTION, SOLUTION INTRAVENOUS at 02:11

## 2017-12-25 RX ADMIN — TACROLIMUS 2 MG: 5 CAPSULE ORAL at 09:41

## 2017-12-25 RX ADMIN — GABAPENTIN 900 MG: 300 CAPSULE ORAL at 09:40

## 2017-12-25 RX ADMIN — PREDNISONE 5 MG: 5 TABLET ORAL at 09:40

## 2017-12-25 RX ADMIN — Medication 150 MG: at 09:39

## 2017-12-25 RX ADMIN — ASPIRIN 81 MG 81 MG: 81 TABLET ORAL at 09:38

## 2017-12-25 RX ADMIN — LEVOTHYROXINE SODIUM 50 MCG: 50 TABLET ORAL at 09:40

## 2017-12-25 RX ADMIN — PRAVASTATIN SODIUM 10 MG: 10 TABLET ORAL at 09:41

## 2017-12-25 RX ADMIN — CLONIDINE HYDROCHLORIDE 0.1 MG: 0.1 TABLET ORAL at 09:38

## 2017-12-25 NOTE — PLAN OF CARE
Problem: Patient Care Overview  Goal: Plan of Care/Patient Progress Review  Outcome: No Change  VSS. A&Ox4. Denies pain. LS clear, diminished in bases. BS present. Johnson in place and patent. Repositioned every 2 hours. Compliant with meds and diet. IVF d/c'd, ryan PO well. QID BGM with SSI and lantus, 0200 . Likely d/c to home today,  aware.

## 2017-12-25 NOTE — PROGRESS NOTES
Social Work Progress Note  Pt chart reviewed. Pt discussed in interdisciplinary rounds.     Intervention:  Writer contacted [facility] and spoke to Elvira and she explained that she will leave instructions that pt will need to be seen within 24 hours after pt HC orders have been entered.     Team members notified: Bedside RN    Plan:  Anticipated Discharge Placement: Home with HC.  Barriers: None identified at this time.   Follow-up plan: Sw to continue to follow.     GENE Fernando, PAULETTE  [phone]  12:09 PM    ADDENDUM:   Writer coordinated a stretcher transport via [facility] stretcher for 1430. PCS formed completed and faxed to intake. Pt requires a stretcher transport because she is total cares and bed confined. Writer updated pt's spouse and he is amenable with the current plan.     GENE Fernando, MANOJ  [phone]  2:04 PM

## 2017-12-25 NOTE — DISCHARGE SUMMARY
[facility]  Discharge Summary        Gem Jade MRN# [id number]   YOB: 1945 Age: 72 year old     Date of Admission:  12/19/2017  Date of Discharge:  12/25/2017  Admitting Physician:  Jeannette Hurtado MD  Discharge Physician: Leonor Osborn MD  Discharging Service: Hospitalist     Primary Provider: Marivel Barcenas  Primary Care Physician Phone Number: [phone]         Discharge Diagnoses/Problem Oriented Hospital Course (Providers):    Gem Jade was admitted on 12/19/2017 by Jeannette Hurtado MD and I would refer you to their history and physical.  The following problems were addressed during her hospitalization:  72 year old female who was admitted on 12/19/2017 for evaluation and management of altered mental status. She was intubated for airway protection. Work-up was notable for concern of sepsis secondary to urinary tract infection; Vancomycin and Meropenem IV were initiated. Her vital signs stabilized and she did not require vasopressor support. She was successfully extubated on 12/21/2017 and was currently not requiring oxygen. Vancomycin was discontinued on 12/21/2017. Post-extubation she has been alert and oriented, but paranoid of staff. She was initially refusing food and oral medications. Tacrolimus and steroid dosing is being converted to IV. Blood and urine cultures have been negative-to-date. Infectious Disease has been consulted for multiple similar admissions and prior history of VRE, ESBL, and C. Dif. She is status-post renal transplant in 1999  She improved a lot on day of discharge and oral intake improved and mental status improved     Sepsis, secondary to urinary tract infection, resolved  Chronic urinary tract infection  Neurogenic bladder, requiring chronic indwelling Johnson  Personal history of klebsiella, ESBL, VRE, and citrobacter freundii complex UTI  Personal history of candiduria, November 2017  Mrs. Jade has had frequent  episodes of altered mental status secondary to sepsis caused by urinary tract infection. She was recently admitted in November 2017 for same here and [facility]. During her previous admission at [facility] ID considered long-term antibiotic, however elected to not use long-term therapy secondary to history of C.Dif. She continues on Meropenem 1- gram every 8-hours. She received Vancomycin from 12/19/2017-12/21/2017. Johnson was changed in ED on 12/19/2017. By report her  changes indwelling Johnson at home and has not been using sterile technique. Transferred out of ICU 12/22. Ur/blood cx NTD. Has remained afebrile  - continue Meropenem while in hospital per ID--no antbx on discharge.  -She was discharged in stable condition as feeling a lot better and back to her baseline  -She was fully alert awake oriented and appeared competent and requested discharge and spoke to her family  -She was discharged in stable condition and  helped to make arrangements     Acute hypoxic respiratory failure, secondary to sepsis, resolving  Mrs. Jade was intubated in the ED on 12/19/2017 for airway protection and successfully extubated on 12/21/2017. She is currently requiring minimal to no oxygen.   Resolved     Status post renal transplant, 1999, immunosuppressed  Chronic renal failure, GFR 48  Nephrology has been consulted. Last tacrolimus level 3.8 on 12/11/2017. She has been refusing oral medications; steroids and tacrolimus has been transitioned to IV dosing. She has remained normo-hypertensive and was not given stress-dose steroids. UOP > 1- liter/day.  Appreciate Nephrology input  -continue  tacrolimus / steroids--change to po on 12/24.   -Patient calcium level is on the low side. She is on Sensipar for hyperparathyroidism. She has hyperparathyroidism, which nephrology suspect is due to severe vitamin D deficiency. D/C Sensipar and check vitamin D level and renal panel this week  -fax result to her nephrologist per nephrology  recommendations     Acute encephalopathy secondary to sepsis, improving  Head CT on 12/19/2017 without acute pathology. EEG on 12/21/2017 without concern for seizures. Ammonia level is normal. - appreciate Psychiatric consult   - avoid additional opioids and sedating medication  - discontinued Haldol; okay to use Zyprexa for agitation   - continue home baclofen PRN  - continue gabapentin,resume 900- mg BID--pt requests --this is her home dose.          Hypertension, treated  Hyperlipidemia, treated  Chronic, stable.   -Continue meds     Personal history of deep vein thrombosis and pulmonary embolism  Mrs. Jade is not on chronic anticoagulation. Bilateral calves are soft and non-tender, no hypoxia.  - prophylactic Lovenox given, SCDs     Diabetes mellitus, type II, hemoglobin A1c 6.7%, 12/19/2017, complicated by gastroparesis, retinopathy, and polyneuropathy  Chronic, stable.  -continue pta insulin as oral intake has improved     Anemia of chronic disease, range 10.6-11.8  Chronic, stable. No obvious signs of blood loss.     Thrombocytopenia likely secondary to immunosuppression and acute illness   Per EMR review typically low-normal. Has received Heparin, no concern for HIT at this time. No obvious signs of blood loss.      Impaired physical mobility and debility, chronic, secondary to polyneuropathy, vertigo, and Charcot foot   Per EMR review is bedridden with severely limited mobility.   Her  is her primary caretaker and she lives locally in her own home.   - continue PT/OT at home     DVT Prophylaxis:  - Enoxaparin (Lovenox) SQ given and Pneumatic Compression Devices    Code Status:   -Full Code     Disposition:   -Patient wanted to go home today as she was feeling back to her baseline.  -Home care was ordered  -she spoke to her family and arrangements were made at home to take care of her  -pt is total cares.   Discussed care with patient and her RN  We checked with the nephrology and they were okay for  the discharge of this patient with instructions to do renal panel and calcium and vitamin D this week and fax results to her nephrology        Code Status:      Full Code        Brief Hospital Stay Summary Sent Home With Patient in AVS:        Reason for your hospital stay       You were admitted for altered mental status due to another UTI.                        You have Neurogenic bladder with recurrent UTIs.                You were treated with IV meropenem      you were seen by nephrology and infectious disease specialist in the   hospital   calcium level is on the low side. You are on Sensipar for   hyperparathyroidism. She has hyperparathyroidism, which we suspect is due   to severe vitamin D deficiency. Discontinue Sensipar and check vitamin D   level Renal panel this week  Fax result to your nephrologist.  At the time of discharge you were feeling better and mental status   improved and you were discharged on your request in stable condition.                           Important Results:              Pending Results:        Unresulted Labs Ordered in the Past 30 Days of this Admission     Date and Time Order Name Status Description    12/19/2017 1631 Fungus culture blood Preliminary             Discharge Instructions and Follow-Up:      Follow-up Appointments     Follow-up and recommended labs and tests        Follow up with primary care provider, Marivel Barcenas NP,   within 3-4 days for hospital follow- up.  The following labs/tests are   recommended: vit D and renal panel have your PCP fax results to   nephrologist                      Discharge Disposition:      Discharged to home        Discharge Medications:        Current Discharge Medication List      CONTINUE these medications which have CHANGED    Details   tacrolimus (GENERIC EQUIVALENT) 1 MG capsule Take 2 capsules (2 mg) by mouth 2 times daily    Associated Diagnoses: Kidney transplant recipient; Long-term use of immunosuppressant  medication         CONTINUE these medications which have NOT CHANGED    Details   BACLOFEN PO Take 10 mg by mouth 3 times daily as needed for muscle spasms      levothyroxine (SYNTHROID/LEVOTHROID) 50 MCG tablet TAKE ONE TABLET BY MOUTH EVERY DAY  Qty: 90 tablet, Refills: 3    Associated Diagnoses: Other specified hypothyroidism      diphenoxylate-atropine (LOMOTIL) 2.5-0.025 MG per tablet Take 1 tablet by mouth 4 times daily as needed for diarrhea  Qty: 30 tablet, Refills: 0    Associated Diagnoses: Diarrhea of presumed infectious origin      meclizine (ANTIVERT) 12.5 MG tablet Take 1 tablet (12.5 mg) by mouth 3 times daily as needed for dizziness  Qty: 30 tablet, Refills: 0    Associated Diagnoses: Disorientation      gabapentin (NEURONTIN) 300 MG capsule Take 3 capsules (900 mg) by mouth 2 times daily For pain  Qty: 180 capsule, Refills: 3    Associated Diagnoses: Diabetic polyneuropathy associated with type 2 diabetes mellitus (H)      cloNIDine (CATAPRES) 0.1 MG tablet TAKE ONE TABLET BY MOUTH TWICE A DAY  Qty: 180 tablet, Refills: 3    Associated Diagnoses: HTN, goal below 140/90      Cholecalciferol (VITAMIN D) 2000 UNITS tablet Take 4,000 Units by mouth 2 times daily  Qty: 100 tablet, Refills: 3    Associated Diagnoses: Vitamin deficiency      predniSONE (DELTASONE) 5 MG tablet Take 1 tablet (5 mg) by mouth daily  Qty: 90 tablet, Refills: 3    Associated Diagnoses: Kidney replaced by transplant      insulin glargine (LANTUS SOLOSTAR) 100 UNIT/ML injection Inject 24 units under the skin daily.  Qty: 15 mL, Refills: 11    Comments: Hold on file for refills  Associated Diagnoses: Type 2 diabetes mellitus with diabetic polyneuropathy, with long-term current use of insulin (H)      insulin aspart (NOVOLOG FLEXPEN) 100 UNIT/ML injection Inject 10 units under the skin 2 times daily before meals as directed. Add sliding scale as needed. Avg use 25 units per day  Qty: 15 mL, Refills: 11    Associated Diagnoses: Type  2 diabetes mellitus with diabetic polyneuropathy, with long-term current use of insulin (H)      lidocaine (XYLOCAINE) 2 % topical gel Apply topically as needed for moderate pain 10 ML every 3 weeks  Qty: 30 mL, Refills: 11    Associated Diagnoses: Johnson catheter in place      labetalol (NORMODYNE) 300 MG tablet Take 150 mg by mouth 2 times daily Take 1/2 tablet (150mg) =300mg. Hold for systolic BP less than 120.  Qty: 120 tablet, Refills: 3    Associated Diagnoses: Benign essential hypertension; Kidney replaced by transplant      loratadine (CLARITIN) 10 MG tablet Take 1 tablet (10 mg) by mouth daily  Qty: 90 tablet, Refills: 3    Associated Diagnoses: Acute nasopharyngitis      pravastatin (PRAVACHOL) 10 MG tablet TAKE ONE TABLET BY MOUTH EVERY DAY  Qty: 90 tablet, Refills: 3    Associated Diagnoses: Hyperlipidemia LDL goal <100      OXcarbazepine (TRILEPTAL) 300 MG tablet Take 1 tablet (300 mg) by mouth 2 times daily For pain  Qty: 180 tablet, Refills: 3    Associated Diagnoses: Diabetic polyneuropathy associated with diabetes mellitus due to underlying condition (H)      aspirin 81 MG chewable tablet Take 1 tablet (81 mg) by mouth daily Starting 1 week after your kidney biopsy  Qty: 90 tablet, Refills: 3    Associated Diagnoses: Pulmonary embolism and infarction (H); Diabetes mellitus with background retinopathy (H)      cyanocobalamin (VITAMIN B12) 1000 MCG/ML injection Inject 1 mL (1,000 mcg) into the muscle every 30 days  Qty: 3 mL, Refills: 3    Associated Diagnoses: Iron deficiency anemia, unspecified iron deficiency anemia type      TraMADol HCl 50 MG TBDP Take 50 mg by mouth 3 times daily as needed  Qty: 90 tablet, Refills: 3    Associated Diagnoses: Chronic shoulder pain, unspecified laterality      ondansetron (ZOFRAN ODT) 4 MG disintegrating tablet Take 1-2 tablets (4-8 mg) by mouth every 8 hours as needed for nausea  Qty: 20 tablet, Refills: 5    Associated Diagnoses: Nausea      simethicone (MYLICON)  125 MG CHEW Take 1 tablet (125 mg) by mouth as needed for intestinal gas  Qty: 120 tablet      Lactobacillus Rhamnosus, GG, (CULTURELL) capsule Take 1 capsule by mouth 2 times daily  Qty: 60 capsule, Refills: 11    Associated Diagnoses: Diarrhea      Cranberry 400 MG TABS Take 400 mg by mouth 2 times daily      famotidine (PEPCID) 20 MG tablet Take 1 tablet (20 mg) by mouth 2 times daily  Qty: 60 tablet, Refills: 1      ACETAMINOPHEN PO Take 325-650 mg by mouth every 4 hours as needed.      !! order for DME High back guerrero wheel chair  MICHELLE 99  DX: morbid obesity, generalized weakness, physical deconditioning, chronic vertigo  Qty: 1 Device, Refills: 0    Associated Diagnoses: Morbid obesity, unspecified obesity type (H); Vertigo; Generalized muscle weakness; Physical deconditioning      blood glucose monitoring (ACCU-CHEK COMPACT PLUS) test strip Use to test blood sugar 3 times daily or as directed.  Ok to substitute alternative if insurance prefers.  Qty: 100 strip, Refills: prn      !! order for DME Equipment being ordered: Silver Impregnated catheters  HX of frequent UTI  Qty: 12 each, Refills: 1    Associated Diagnoses: Recurrent UTI      !! order for DME Equipment being ordered: Ahuja FX CPAP interface (nasal pillows), size XS.  Qty: 1 each, Refills: 1    Associated Diagnoses: Obstructive sleep apnea syndrome      insulin pen needle 30G X 8 MM Use as directed with Lantus  Qty: 100 each, Refills: 3    Associated Diagnoses: Type 2 diabetes mellitus with diabetic nephropathy (H)      !! order for DME Equipment being ordered: Blue Chux  [facility] send to [facility]  Qty: 100 pad, Refills: 3    Associated Diagnoses: Fecal incontinence      !! ORDER FOR DME Equipment being ordered: Johnson Catheters - 18 french  Qty: 3 catheter, Refills: PRN    Associated Diagnoses: Johnson catheter status       !! - Potential duplicate medications found. Please discuss with provider.      STOP taking these medications       cinacalcet  "(SENSIPAR) 30 MG tablet Comments:   Reason for Stopping:                 Allergies:         Allergies   Allergen Reactions     Bactrim Ds [Sulfamethoxazole W/Trimethoprim]      Creatinine level increased     Atorvastatin Calcium      myalgias, muscle aches     Codeine Nausea and Vomiting     Darvocet [Propoxyphene N-Apap] Nausea and Vomiting     Hydromorphone      Levofloxacin Other (See Comments) and Diarrhea     hallucinations     Morphine      Nausea and vomiting     Niaspan [Niacin] GI Disturbance     Flushing even at low dose, GI upset     Percocet [Oxycodone-Acetaminophen]      Vomiting after taking med couple of times     Vicodin [Hydrocodone-Acetaminophen] Nausea and Vomiting           Consultations This Hospital Stay:      Consultation during this admission received from nephrology and psychiatry        Condition and Physical on Discharge:      Discharge condition: Stable   Vitals: Heart Rate: 65, Blood pressure 134/51, pulse 75, temperature 97.3  F (36.3  C), temperature source Oral, resp. rate 16, height 1.676 m (5' 6\"), weight 108 kg (238 lb), SpO2 97 %, not currently breastfeeding.     Constitutional: Alert awake oriented     Lungs: fair air entry on both sides of lungs     Cardiovascular: S1 and S2 no S3      Abdomen: Abdomen soft no guarding ,no rigidity, bowel sounds are positive     Skin:    Other:          Discharge Time:      Greater than 30 minutes.        Image Results From This Hospital Stay (For Non-EPIC Providers):        Results for orders placed or performed during the hospital encounter of 12/19/17   Chest  XR, 1 view PORTABLE    Narrative    CHEST ONE VIEW PORTABLE  12/19/2017 4:34 PM      HISTORY: Post intubation, gastric tube confirmation.    COMPARISON: 11/15/2017.    FINDINGS: Supine portable chest. An ET tube is in place approximately  3 cm above the abena. NG tube passes in the stomach. No pneumothorax.  The heart size is normal. Thoracic aorta is calcified. Minimal left  basilar " atelectasis. Lungs are otherwise clear.      Impression    IMPRESSION: NG tube into the stomach.    STEFFANY KAUR MD   Head CT w/o contrast    Narrative    CT SCAN OF THE HEAD WITHOUT CONTRAST   12/19/2017 4:47 PM     HISTORY: Altered mental status; unresponsive for 2 days, possible  urinary tract infection.    TECHNIQUE:  Axial images of the head and coronal reformations without  IV contrast material. Radiation dose for this scan was reduced using  automated exposure control, adjustment of the mA and/or kV according  to patient size, or iterative reconstruction technique.    COMPARISON: 11/13/2017    FINDINGS:  There is generalized atrophy of the brain.  There is low  attenuation in the white matter of the cerebral hemispheres consistent  with sequelae of small vessel ischemic disease. There is no evidence  of intracranial hemorrhage, mass, acute infarct or anomaly.     The visualized portions of the sinuses and mastoids appear normal.  There is no evidence of trauma.      Impression    IMPRESSION:   1. No acute abnormality.  2. Atrophy of the brain.  White matter changes consistent with  sequelae of small vessel ischemic disease.  3. No change.      ELIZABETH GRIMM MD   XR Chest Port 1 View    Narrative    CHEST ONE VIEW PORTABLE   12/19/2017 8:55 PM     HISTORY: Endotracheal tube positioning.    COMPARISON: 12/19/2017 at 1552 hours      Impression    IMPRESSION: Endotracheal tube in good position. Nasogastric tube  passes into the abdomen. Lungs are clear. Mild cardiomegaly. Normal  pulmonary vascularity. No change.    ELIZABETH GRIMM MD     *Note: Due to a large number of results and/or encounters for the requested time period, some results have not been displayed. A complete set of results can be found in Results Review.           Most Recent Lab Results In EPIC (For Non-EPIC Providers):    Most Recent 3 CBC's:  Recent Labs   Lab Test  12/24/17   0605  12/23/17   0655  12/21/17   0452   WBC  7.4  8.1  8.9   HGB   9.6*  10.2*  11.8   MCV  96  96  95   PLT  136*  146*  127*      Most Recent 3 BMP's:  Recent Labs   Lab Test  12/24/17   0605  12/23/17   0655  12/21/17   0452   NA  138  139  140   POTASSIUM  3.8  3.7  4.1   CHLORIDE  106  105  106   CO2  26  24  26   BUN  29  29  30   CR  1.29*  1.10*  1.11*   ANIONGAP  6  10  8   ROGER  7.9*  8.3*  8.1*   GLC  161*  143*  83     Most Recent 3 Troponin's:  Recent Labs   Lab Test  11/13/17   1613  04/19/17   0005  01/10/16   1408   TROPI  <0.015  <0.015  The 99th percentile for upper reference range is 0.045 ug/L.  Troponin values in   the range of 0.045 - 0.120 ug/L may be associated with risks of adverse   clinical events.    <0.015  The 99th percentile for upper reference range is 0.045 ug/L.  Troponin values in   the range of 0.045 - 0.120 ug/L may be associated with risks of adverse   clinical events.       Most Recent 3 INR's:  Recent Labs   Lab Test  11/06/17   1652  04/26/17   2338  04/26/17   0638   INR  1.09  1.03  1.06     Most Recent 2 LFT's:  Recent Labs   Lab Test  12/19/17   1602  11/16/17   0525   AST  16  13   ALT  21  11   ALKPHOS  83  64   BILITOTAL  0.4  0.3     Most Recent Cholesterol Panel:  Recent Labs   Lab Test  12/05/16   1210   CHOL  164   LDL  64   HDL  46*   TRIG  272*     Most Recent 6 Bacteria Isolates From Any Culture (See EPIC Reports for Culture Details):  Recent Labs   Lab Test  12/19/17   2233  12/19/17   1900  12/19/17   1620  12/19/17   1619  12/19/17   1602  11/13/17   1714   CULT  No MRSA isolated  No growth after 6 days  No growth  No growth  No growth  No growth     Most Recent TSH, T4 and HgbA1c:   Recent Labs   Lab Test  12/19/17   1602   03/09/17   1310   TSH  1.34   < >  1.20   T4   --    --   0.72*   A1C  6.7*   < >   --     < > = values in this interval not displayed.

## 2017-12-25 NOTE — DISCHARGE SUMMARY
Patient discharged at 2:53 PM to home.  IV was discontinued. Pain at time of discharge was 0/10. Belongings returned to patient.  Discharge instructions and medications reviewed with patient.  Patient verbalized understanding and all questions were answered. Prescriptions sent to local  Pharmacy. At time of discharge, patient condition was stable and left the unit via stretcher escorted by [facility] transport services.

## 2017-12-25 NOTE — PROGRESS NOTES
Renal Medicine Progress Note            Assessment/Plan:     71 y.o woman with ESRD s/p LDKT and neurogenic bladder with recurrent UTIs, admitted for AMS 2/2 another UTI.       # LDKT with allograft serum ~ 0.9-1.1 mg/dl.                          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    -meropenem      #  IDDM. On insulin gtt      Plan.  # Patient calcium level is on the low side. She is on Sensipar for hyperparathyroidism. She has hyperparathyroidism, which I suspect is due to severe vitamin D deficiency. D/C Sensipar and check vitamin D level.   # Renal panel in AM        Interval History:     Patient wants to know if she could go home today. No other complaints. Mental status seems to be back at baseline.          Medications and Allergies:       tacrolimus  2 mg Oral BID     gabapentin  900 mg Oral BID     predniSONE  5 mg Oral Daily     insulin glargine  12 Units Subcutaneous At Bedtime     insulin aspart  1-7 Units Subcutaneous TID AC     insulin aspart  1-5 Units Subcutaneous At Bedtime     labetalol  150 mg Oral BID     pravastatin  10 mg Oral Daily     OXcarbazepine  300 mg Oral BID     levothyroxine  50 mcg Oral Daily     aspirin  81 mg Oral Daily     cinacalcet  30 mg Oral Daily     cloNIDine  0.1 mg Oral BID     enoxaparin  40 mg Subcutaneous Q24H     miconazole   Topical BID     meropenem  1 g Intravenous Q8H     famotidine  20 mg Oral BID        Allergies   Allergen Reactions     Bactrim Ds [Sulfamethoxazole W/Trimethoprim]      Creatinine level increased     Atorvastatin Calcium      myalgias, muscle aches     Codeine Nausea and Vomiting     Darvocet [Propoxyphene N-Apap] Nausea and Vomiting     Hydromorphone      Levofloxacin Other (See Comments) and Diarrhea     hallucinations  "    Morphine      Nausea and vomiting     Niaspan [Niacin] GI Disturbance     Flushing even at low dose, GI upset     Percocet [Oxycodone-Acetaminophen]      Vomiting after taking med couple of times     Vicodin [Hydrocodone-Acetaminophen] Nausea and Vomiting            Physical Exam:   Vitals were reviewed  Heart Rate: 65, Blood pressure 134/51, pulse 75, temperature 97.3  F (36.3  C), temperature source Oral, resp. rate 16, height 1.676 m (5' 6\"), weight 108 kg (238 lb), SpO2 97 %, not currently breastfeeding.    Wt Readings from Last 3 Encounters:   12/23/17 108 kg (238 lb)   11/17/17 101.7 kg (224 lb 1.6 oz)   11/08/17 103.5 kg (228 lb 2.8 oz)       Intake/Output Summary (Last 24 hours) at 12/25/17 1235  Last data filed at 12/25/17 1000   Gross per 24 hour   Intake              120 ml   Output             1000 ml   Net             -880 ml       GENERAL APPEARANCE: NAD. Pleasant  HEENT:  Eyes/ears/nose/neck grossly normal  RESP: lungs cta b c good efforts, no crackles, rhonchi or wheezes  CV: RRR, nl S1/S2, no m/r/g   ABDOMEN: obese, soft,   EXTREMITIES/SKIN: no rashes/lesions on observed skin; + edema  Neuro: a/o x3         Data:     CBC RESULTS:     Recent Labs  Lab 12/24/17  0605 12/23/17  0655 12/21/17  0452 12/20/17  0525 12/19/17  1602   WBC 7.4 8.1 8.9 6.0 6.9   RBC 3.23* 3.35* 3.89 3.73* 3.82   HGB 9.6* 10.2* 11.8 11.4* 11.6*   HCT 31.0* 32.0* 37.1 34.8* 36.2   * 146* 127* 135* 156       Basic Metabolic Panel:    Recent Labs  Lab 12/24/17  0605 12/23/17  0655 12/21/17  0452 12/20/17  0525 12/19/17  1602    139 140 139 138   POTASSIUM 3.8 3.7 4.1 3.7 4.7   CHLORIDE 106 105 106 106 104   CO2 26 24 26 25 30   BUN 29 29 30 33* 38*   CR 1.29* 1.10* 1.11* 1.13* 1.21*   * 143* 83 123* 158*   ROGER 7.9* 8.3* 8.1* 8.1* 8.5       INRNo lab results found in last 7 days.   Attestation:   I have reviewed today's relevant vital signs, notes, medications, labs and imaging.    Gaurav Carson, " MD [facility] - Nephrology  Office phone :[phone]  Pager: [phone]

## 2017-12-26 ENCOUNTER — DOCUMENTATION ONLY (OUTPATIENT)
Dept: CARE COORDINATION | Facility: CLINIC | Age: 72
End: 2017-12-26

## 2017-12-26 DIAGNOSIS — Z79.60 LONG-TERM USE OF IMMUNOSUPPRESSANT MEDICATION: ICD-10-CM

## 2017-12-26 DIAGNOSIS — Z94.0 KIDNEY TRANSPLANT RECIPIENT: ICD-10-CM

## 2017-12-26 NOTE — PROGRESS NOTES
[facility] now requests orders and shares plan of care/discharge summaries for some patients through [employer].  Please REPLY TO THIS MESSAGE in order to give authorization for orders when needed.  This is considered a verbal order, you will still receive a faxed copy of orders for signature.  Thank you for your assistance in improving collaboration for our patients.    RESUMPTION OF CARE ORDER    Skilled nurse 2week7, 1week1 plus 4 PRN visits  PT eval and treat  OT eval and treat  HA 1week1, 2week6

## 2017-12-27 ENCOUNTER — TELEPHONE (OUTPATIENT)
Dept: FAMILY MEDICINE | Facility: CLINIC | Age: 72
End: 2017-12-27

## 2017-12-27 NOTE — TELEPHONE ENCOUNTER
Reason for Call:  Home Health Care    Bibi anna [facility] called regarding (reason for call): orders    PT: Eval and treat    OT: Eval and treat    Skilled Nursin/week for seven weeks, 1/week for one week    Home Health Aid: 1/week for one week, 2/week for 6 weeks    Phone Number Homecare Nurse can be reached at: [phone]    Can we leave a detailed message on this number? YES    Best Time: Anytime    Call taken on 2017 at 12:53 PM by Paige Molina

## 2017-12-27 NOTE — TELEPHONE ENCOUNTER
Verbal ok for resumption orders given per RN protocol for the skilled visits    They are aware that the pt will have a new PCP at the end of this month as this clinic [facility] is closing    Aleksandra Ramos RN   [facility]

## 2017-12-28 DIAGNOSIS — Z79.60 LONG-TERM USE OF IMMUNOSUPPRESSANT MEDICATION: ICD-10-CM

## 2017-12-28 DIAGNOSIS — Z94.0 KIDNEY TRANSPLANT RECIPIENT: ICD-10-CM

## 2017-12-28 DIAGNOSIS — D84.9 IMMUNOSUPPRESSED STATUS (H): ICD-10-CM

## 2017-12-28 LAB
ERYTHROCYTE [DISTWIDTH] IN BLOOD BY AUTOMATED COUNT: 14.1 % (ref 10–15)
HCT VFR BLD AUTO: 32.7 % (ref 35–47)
HGB BLD-MCNC: 10.1 G/DL (ref 11.7–15.7)
MCH RBC QN AUTO: 30.2 PG (ref 26.5–33)
MCHC RBC AUTO-ENTMCNC: 30.9 G/DL (ref 31.5–36.5)
MCV RBC AUTO: 98 FL (ref 78–100)
PLATELET # BLD AUTO: 155 10E9/L (ref 150–450)
RBC # BLD AUTO: 3.34 10E12/L (ref 3.8–5.2)
WBC # BLD AUTO: 6 10E9/L (ref 4–11)

## 2017-12-28 PROCEDURE — 36415 COLL VENOUS BLD VENIPUNCTURE: CPT | Performed by: INTERNAL MEDICINE

## 2017-12-28 PROCEDURE — 85027 COMPLETE CBC AUTOMATED: CPT | Performed by: INTERNAL MEDICINE

## 2017-12-28 PROCEDURE — 80197 ASSAY OF TACROLIMUS: CPT | Performed by: INTERNAL MEDICINE

## 2017-12-28 PROCEDURE — 80048 BASIC METABOLIC PNL TOTAL CA: CPT | Performed by: INTERNAL MEDICINE

## 2017-12-28 RX ORDER — TACROLIMUS 1 MG/1
2 CAPSULE ORAL 2 TIMES DAILY
Qty: 120 CAPSULE | Refills: 11 | Status: SHIPPED | OUTPATIENT
Start: 2017-12-28 | End: 2018-01-05

## 2017-12-29 ENCOUNTER — TELEPHONE (OUTPATIENT)
Dept: TRANSPLANT | Facility: CLINIC | Age: 72
End: 2017-12-29

## 2017-12-29 LAB
ANION GAP SERPL CALCULATED.3IONS-SCNC: 8 MMOL/L (ref 3–14)
BUN SERPL-MCNC: 23 MG/DL (ref 7–30)
CALCIUM SERPL-MCNC: 8 MG/DL (ref 8.5–10.1)
CHLORIDE SERPL-SCNC: 104 MMOL/L (ref 94–109)
CO2 SERPL-SCNC: 26 MMOL/L (ref 20–32)
CREAT SERPL-MCNC: 0.96 MG/DL (ref 0.52–1.04)
GFR SERPL CREATININE-BSD FRML MDRD: 57 ML/MIN/1.7M2
GLUCOSE SERPL-MCNC: 125 MG/DL (ref 70–99)
POTASSIUM SERPL-SCNC: 4.1 MMOL/L (ref 3.4–5.3)
SODIUM SERPL-SCNC: 138 MMOL/L (ref 133–144)
TACROLIMUS BLD-MCNC: 4.8 UG/L (ref 5–15)
TME LAST DOSE: ABNORMAL H

## 2017-12-29 NOTE — LETTER
PHYSICIAN ORDERS      DATE & TIME ISSUED: 2017 3:51 PM  PATIENT NAME: Gem Jade   : 1945     [facility] MR# [if applicable]: [id number]     DIAGNOSIS:  Kidney Transplant  ICD-9 CODE: Z94.0     Please add home skilled nursing visit the week of 2018 for lab collection. The following labs are needed:   12 hour Tacrolimus level   Vitamin D level    Any questions please call: [phone]    Please fax these results to [phone].    .

## 2017-12-29 NOTE — TELEPHONE ENCOUNTER
ISSUE:   Tac 4.8, unable to determine how many our trough tacrolimus level was.   Per hospital discharge instructions, patient was supposed to have vit D level drawn this week:      Patient calcium level is on the low side. She is on Sensipar for hyperparathyroidism. She has hyperparathyroidism, which nephrology suspect is due to severe vitamin D deficiency. D/C Sensipar and check vitamin D level and renal panel this week    PLAN:  Call lab to add on vitamin D level. If unable to add on, send order to be drawn next week.   Call [facility] to remind them to get 12 hour tacrolimus level next week. Send order if needed.

## 2017-12-29 NOTE — TELEPHONE ENCOUNTER
Unable to add Vitamin D to tubes from yesterday. Left message for Tamara Arcos RN clinical coord at [facility] and gave verbal for additional lab collection next week. Also faxed order. Left transplant office # if any further questions.

## 2018-01-02 ENCOUNTER — TELEPHONE (OUTPATIENT)
Dept: FAMILY MEDICINE | Facility: CLINIC | Age: 73
End: 2018-01-02

## 2018-01-02 LAB — DEPRECATED CALCIDIOL+CALCIFEROL SERPL-MC: 32 UG/L (ref 20–75)

## 2018-01-02 PROCEDURE — 80197 ASSAY OF TACROLIMUS: CPT

## 2018-01-02 PROCEDURE — 82306 VITAMIN D 25 HYDROXY: CPT

## 2018-01-02 NOTE — PROGRESS NOTES
"This was completed on 12/27. TE from RN: \"Verbal ok for resumption orders given per RN protocol for the skilled visits     They are aware that the pt will have a new PCP at the end of this month as this clinic [facility] is closing\"     Aleksandra Ramos RN   [facility]      Thanks! Shayna Licona RN    "

## 2018-01-02 NOTE — PROGRESS NOTES
Please make sure [facility] knows that Shantelle's PCP will not change and she will follow me to the [facility] clinic. Please put her on my schedule as a phone visit for 1/2/2018     Klaudia MTZ

## 2018-01-02 NOTE — TELEPHONE ENCOUNTER
Call placed to pt at 3:32 pm for scheduled tele visit, no answer, writer left VM. Writer called pt back at 3:48 pm, again no answer. Left  for pt to call back to reschedule at a later date. Routing to PCP as an FYI.

## 2018-01-03 ENCOUNTER — ALLIED HEALTH/NURSE VISIT (OUTPATIENT)
Dept: PHARMACY | Facility: CLINIC | Age: 73
End: 2018-01-03
Payer: COMMERCIAL

## 2018-01-03 ENCOUNTER — TELEPHONE (OUTPATIENT)
Dept: PHARMACY | Facility: CLINIC | Age: 73
End: 2018-01-03

## 2018-01-03 ENCOUNTER — TELEPHONE (OUTPATIENT)
Dept: FAMILY MEDICINE | Facility: CLINIC | Age: 73
End: 2018-01-03

## 2018-01-03 DIAGNOSIS — E21.3 HYPERPARATHYROIDISM (H): ICD-10-CM

## 2018-01-03 DIAGNOSIS — E11.42 TYPE 2 DIABETES MELLITUS WITH DIABETIC POLYNEUROPATHY, WITH LONG-TERM CURRENT USE OF INSULIN (H): ICD-10-CM

## 2018-01-03 DIAGNOSIS — Z94.0 KIDNEY REPLACED BY TRANSPLANT: Primary | ICD-10-CM

## 2018-01-03 DIAGNOSIS — Z79.4 TYPE 2 DIABETES MELLITUS WITH DIABETIC POLYNEUROPATHY, WITH LONG-TERM CURRENT USE OF INSULIN (H): ICD-10-CM

## 2018-01-03 LAB
TACROLIMUS BLD-MCNC: 3 UG/L (ref 5–15)
TME LAST DOSE: ABNORMAL H

## 2018-01-03 PROCEDURE — 99605 MTMS BY PHARM NP 15 MIN: CPT | Performed by: PHARMACIST

## 2018-01-03 NOTE — MR AVS SNAPSHOT
After Visit Summary   1/3/2018    Gem Jade    MRN: [id number]           Patient Information     Date Of Birth          1945        Visit Information        Provider Department      1/3/2018 4:00 PM Conchita Toledo, [facility] Primary Care MTM        Today's Diagnoses     Kidney replaced by transplant    -  1    Type 2 diabetes mellitus with diabetic polyneuropathy, with long-term current use of insulin (H)        Hyperparathyroidism (H)          Care Instructions    Recommendations from today's MTM visit:                                                        1. Continue off Sensipar     2. Please follow-up with the transplant coordinator about the lab results    Next MTM visit: 1 month by phone    To schedule another MTM appointment, please call the clinic directly or you may call the MTM scheduling line at [phone] or toll-free at [phone].     My Clinical Pharmacist's contact information:                                                      It was a pleasure seeing you today!  Please feel free to contact me with any questions or concerns you have.      Conchita Toledo, PharmD, BCACP     You may receive a survey about the MTM services you received.  I would appreciate your feedback to help me serve you better in the future. Please fill it out and return it when you can. Your comments will be anonymous.                    Follow-ups after your visit        Your next 10 appointments already scheduled     Jan 08, 2018  3:30 PM CST   Telephone Visit with ANTHONY Antonio [facility] ([facility]    [address]  [address]  [facility] [id number] [phone]           Note: this is not an onsite visit; there is no need to come to the facility.            Feb 06, 2018  3:30 PM CST   TELEMEDICINE with Conchita Toledo [facility] ([facility]    [address] [place] [address] [address] [place] [phone]           Note: this is not an onsite visit; there is no need to come to the facility.              Who to contact     If you have questions or need follow up information about today's clinic visit or your schedule please contact [facility] directly at [phone].  Normal or non-critical lab and imaging results will be communicated to you by uromoviehart, letter or phone within 4 business days after the clinic has received the results. If you do not hear from us within 7 days, please contact the clinic through Heliatekt or phone. If you have a critical or abnormal lab result, we will notify you by phone as soon as possible.  Submit refill requests through needmade or call your pharmacy and they will forward the refill request to us. Please allow 3 business days for your refill to be completed.          Additional Information About Your Visit        needmade Information     needmade gives you secure access to your electronic health record. If you see a primary care provider, you can also send messages to your care team and make appointments. If you have questions, please call your primary care clinic.  If you do not have a primary care provider, please call [phone] and they will assist you.        Care EveryWhere ID     This is your Care EveryWhere ID. This could be used by other organizations to access your [facility] medical records  [id number]         Blood Pressure from Last 3 Encounters:   12/25/17 134/51   12/06/17 110/68   11/21/17 130/70    Weight from Last 3 Encounters:   12/23/17 238 lb (108 kg)   11/17/17 224 lb 1.6 oz (101.7 kg)   11/08/17 228 lb 2.8 oz (103.5 kg)              Today, you had the following     No orders found for display         Today's Medication Changes          These changes are accurate as of: 1/3/18 11:59 PM.  If you have any  questions, ask your nurse or doctor.               These medicines have changed or have updated prescriptions.        Dose/Directions    insulin aspart 100 UNIT/ML injection   Commonly known as:  NovoLOG FLEXPEN   This may have changed:  additional instructions   Used for:  Type 2 diabetes mellitus with diabetic polyneuropathy, with long-term current use of insulin (H)        Inject 10 units under the skin 2 times daily before meals as directed. Add sliding scale as needed. Avg use 25 units per day   Quantity:  15 mL   Refills:  11       insulin glargine 100 UNIT/ML injection   Commonly known as:  LANTUS SOLOSTAR   This may have changed:    - how much to take  - how to take this  - when to take this  - additional instructions   Used for:  Type 2 diabetes mellitus with diabetic polyneuropathy, with long-term current use of insulin (H)        Inject 24 units under the skin daily.   Quantity:  15 mL   Refills:  11                Primary Care Provider Office Phone # Fax #    Marivel Salgado Swapna, APRN [facility] [phone]       [address] [address]        Equal Access to Services     CARLOS ENRIQUE GREEN : Hadii jairo ku hadasho Soomaali, waaxda luqadaha, qaybta kaalmada adeegyada, waxay idiin hayjessie aguilar . So [facility] [phone].    ATENCIÓN: Si habla español, tiene a peguero disposición servicios gratuitos de asistencia lingüística. Llame al [phone].    We comply with applicable federal civil rights laws and [place] laws. We do not discriminate on the basis of race, color, national origin, age, disability, sex, sexual orientation, or gender identity.            Thank you!     Thank you for choosing [facility] PRIMARY CARE MTM  for your care. Our goal is always to provide you with excellent care. Hearing back from our patients is one way we can continue to improve our services. Please take a few minutes to complete the written survey that you may receive in  the mail after your visit with us. Thank you!             Your Updated Medication List - Protect others around you: Learn how to safely use, store and throw away your medicines at www.disposemymeds.org.          This list is accurate as of: 1/3/18 11:59 PM.  Always use your most recent med list.                   Brand Name Dispense Instructions for use Diagnosis    ACCU-CHEK COMPACT PLUS test strip   Generic drug:  blood glucose monitoring     100 strip    Use to test blood sugar 3 times daily or as directed.  Ok to substitute alternative if insurance prefers.        ACETAMINOPHEN PO      Take 325-650 mg by mouth every 4 hours as needed.        aspirin 81 MG chewable tablet     90 tablet    Take 1 tablet (81 mg) by mouth daily Starting 1 week after your kidney biopsy    Pulmonary embolism and infarction (H), Diabetes mellitus with background retinopathy (H)       BACLOFEN PO      Take 10 mg by mouth 3 times daily as needed for muscle spasms        cloNIDine 0.1 MG tablet    CATAPRES    180 tablet    TAKE ONE TABLET BY MOUTH TWICE A DAY    HTN, goal below 140/90       Cranberry 400 MG Tabs      Take 400 mg by mouth 2 times daily        cyanocobalamin 1000 MCG/ML injection    VITAMIN B12    3 mL    Inject 1 mL (1,000 mcg) into the muscle every 30 days    Iron deficiency anemia, unspecified iron deficiency anemia type       diphenoxylate-atropine 2.5-0.025 MG per tablet    LOMOTIL    30 tablet    Take 1 tablet by mouth 4 times daily as needed for diarrhea    Diarrhea of presumed infectious origin       famotidine 20 MG tablet    PEPCID    60 tablet    Take 1 tablet (20 mg) by mouth 2 times daily        gabapentin 300 MG capsule    NEURONTIN    180 capsule    Take 3 capsules (900 mg) by mouth 2 times daily For pain    Diabetic polyneuropathy associated with type 2 diabetes mellitus (H)       insulin aspart 100 UNIT/ML injection    NovoLOG FLEXPEN    15 mL    Inject 10 units under the skin 2 times daily before meals as  directed. Add sliding scale as needed. Avg use 25 units per day    Type 2 diabetes mellitus with diabetic polyneuropathy, with long-term current use of insulin (H)       insulin glargine 100 UNIT/ML injection    LANTUS SOLOSTAR    15 mL    Inject 24 units under the skin daily.    Type 2 diabetes mellitus with diabetic polyneuropathy, with long-term current use of insulin (H)       insulin pen needle 30G X 8 MM     100 each    Use as directed with Lantus    Type 2 diabetes mellitus with diabetic nephropathy (H)       labetalol 300 MG tablet    NORMODYNE    120 tablet    Take 150 mg by mouth 2 times daily Take 1/2 tablet (150mg) =300mg. Hold for systolic BP less than 120.    Benign essential hypertension, Kidney replaced by transplant       lactobacillus rhamnosus (GG) capsule     60 capsule    Take 1 capsule by mouth 2 times daily    Diarrhea       levothyroxine 50 MCG tablet    SYNTHROID/LEVOTHROID    90 tablet    TAKE ONE TABLET BY MOUTH EVERY DAY    Other specified hypothyroidism       lidocaine 2 % topical gel    XYLOCAINE    30 mL    Apply topically as needed for moderate pain 10 ML every 3 weeks    Johnson catheter in place       loratadine 10 MG tablet    CLARITIN    90 tablet    Take 1 tablet (10 mg) by mouth daily    Acute nasopharyngitis       meclizine 12.5 MG tablet    ANTIVERT    30 tablet    Take 1 tablet (12.5 mg) by mouth 3 times daily as needed for dizziness    Disorientation       ondansetron 4 MG ODT tab    ZOFRAN ODT    20 tablet    Take 1-2 tablets (4-8 mg) by mouth every 8 hours as needed for nausea    Nausea       order for DME     3 catheter    Equipment being ordered: Johnson Catheters - 18 french    Johnson catheter status       * order for DME     100 pad    Equipment being ordered: Blue Chux Please send to [facility]    Fecal incontinence       * order for DME     1 each    Equipment being ordered: Xcalar FX CPAP interface (nasal pillows), size XS.    Obstructive sleep apnea syndrome       *  order for DME     12 each    Equipment being ordered: Silver Impregnated catheters HX of frequent UTI    Recurrent UTI       * order for DME     1 Device    High back guerrero wheel chair MICHELLE 99 DX: morbid obesity, generalized weakness, physical deconditioning, chronic vertigo    Morbid obesity, unspecified obesity type (H), Vertigo, Generalized muscle weakness, Physical deconditioning       pravastatin 10 MG tablet    PRAVACHOL    90 tablet    TAKE ONE TABLET BY MOUTH EVERY DAY    Hyperlipidemia LDL goal <100       predniSONE 5 MG tablet    DELTASONE    90 tablet    Take 1 tablet (5 mg) by mouth daily    Kidney replaced by transplant       simethicone 125 MG Chew chewable tablet    MYLICON    120 tablet    Take 1 tablet (125 mg) by mouth as needed for intestinal gas        TraMADol HCl 50 MG Tbdp     90 tablet    Take 50 mg by mouth 3 times daily as needed    Chronic shoulder pain, unspecified laterality       TRILEPTAL 300 MG tablet   Generic drug:  OXcarbazepine     180 tablet    Take 1 tablet (300 mg) by mouth 2 times daily For pain    Diabetic polyneuropathy associated with diabetes mellitus due to underlying condition (H)       vitamin D 2000 UNITS tablet     100 tablet    Take 4,000 Units by mouth 2 times daily    Vitamin deficiency       * Notice:  This list has 4 medication(s) that are the same as other medications prescribed for you. Read the directions carefully, and ask your doctor or other care provider to review them with you.

## 2018-01-03 NOTE — TELEPHONE ENCOUNTER
Reason for Call:  Home Health Care    Izabella with  Homecare called regarding:     Orders are needed for this patient.     PT: once a week X 4 weeks for range of motion and transfers    Phone Number Homecare Nurse can be reached at:  [phone]    Can we leave a detailed message on this number? YES    Phone number patient can be reached at: Home number on file [phone] (home)    Best Time: anytime    Call taken on 1/3/2018 at 4:36 PM by Rich Henderson

## 2018-01-03 NOTE — PROGRESS NOTES
"SUBJECTIVE/OBJECTIVE:                Gem Jade is a 72 year old female called for a transitions of care visit.  She was discharged from [facility] on 12/25/17 for AMS, urosepsis. Visit completed with  Keshawn; ok per pt.   This is a follow-up visit but the first of this calendar year.     Chief Complaint: is Shantelle supposed to restart Sensipar?  Tobacco: History of tobacco dependence - quit   Alcohol: not currently using    Medication Adherence: no issues found and has assistance setting up med boxes (Keshawn).     S/p kidney transplant/hyperparathyroid: currently taking tacrolimus 2mg BID and prednisone 5mg daily. No current SE. Tac levels have been followed by transplant clinic. Stopped taking Sensipar per discharge instructions, able to have blood draw completed by HCRN for vit D and tac levels as recommended. Continues to take vit D supplement at 4000u daily without problems.     Diabetes:  Pt currently taking Lantus 24 units daily, Novolog 10 units with meals 2-3 times a day.   SMBG:  Reports usually 150-200  Symptoms of low blood sugar? \"life is leaving me\". Frequency of hypoglycemia? Rare, none recently  Recent symptoms of high blood sugar? No symptoms  Pt is not taking an ACEi/ARB. (Hx worsening renal function when on ACE/ARB therapy)  Aspirin: taking aspirin 81mg daily without problems  Diet/exercise: appetite not back to normal  Continues to be fatigued and sleeping more than usual, strength is improving. Moved bedroom to the living room so no longer needing to navigate stairs to go in/out of her home.     Current labs include:  BP Readings from Last 3 Encounters:   12/25/17 134/51   12/06/17 110/68   11/21/17 130/70     Today's Vitals: There were no vitals taken for this visit. - phone visit  Lab Results   Component Value Date    A1C 6.7 12/19/2017   .  Lab Results   Component Value Date    CHOL 164 12/05/2016     Lab Results   Component Value Date    TRIG 272 12/05/2016     Lab Results "   Component Value Date    HDL 46 12/05/2016     Lab Results   Component Value Date    LDL 64 12/05/2016       Liver Function Studies -   Recent Labs   Lab Test  12/19/17   1602   PROTTOTAL  7.7   ALBUMIN  3.0*   BILITOTAL  0.4   ALKPHOS  83   AST  16   ALT  21       Lab Results   Component Value Date    UCRR 43 11/16/2017    MICROL 1850 04/26/2017    UMALCR 6776.56 (H) 04/26/2017       Last Basic Metabolic Panel:  Lab Results   Component Value Date     12/28/2017      Lab Results   Component Value Date    POTASSIUM 4.1 12/28/2017     Lab Results   Component Value Date    CHLORIDE 104 12/28/2017     Lab Results   Component Value Date    BUN 23 12/28/2017     Lab Results   Component Value Date    CR 0.96 12/28/2017     GFR Estimate   Date Value Ref Range Status   12/28/2017 57 (L) >60 mL/min/1.7m2 Final     Comment:     Non  GFR Calc   12/24/2017 41 (L) >60 mL/min/1.7m2 Final     Comment:     Non  GFR Calc   12/23/2017 49 (L) >60 mL/min/1.7m2 Final     Comment:     Non  GFR Calc     GFR Estimate If Black   Date Value Ref Range Status   12/28/2017 69 >60 mL/min/1.7m2 Final     Comment:      GFR Calc   12/24/2017 49 (L) >60 mL/min/1.7m2 Final     Comment:      GFR Calc   12/23/2017 59 (L) >60 mL/min/1.7m2 Final     Comment:      GFR Calc     TSH   Date Value Ref Range Status   12/19/2017 1.34 0.40 - 4.00 mU/L Final   ]    Most Recent Immunizations   Administered Date(s) Administered     HepB 01/13/2003     Influenza (H1N1) 11/25/2009     Influenza (High Dose) 3 valent vaccine 10/07/2016     Influenza (IIV3) PF 11/05/2011     Influenza Vaccine IM 3yrs+ 4 Valent IIV4 10/16/2013     Pneumo Conj 13-V (2010&after) 12/01/2015     Pneumococcal 23 valent 06/17/2013     TDAP Vaccine (Adacel) 06/17/2013     Zoster vaccine, live 06/17/2013       ASSESSMENT:                 Current medications were reviewed today.        Medication  Adherence: no issues identified      S/p kidney transplant/hyperparathyroid: needs improvement. OK to remain off Sensipar per nephrology consult. Last Tac level on the low end of goal range; will defer to transplant team for management.   diabetes: needs improvement. A1c at goal <8%. Will continue to monitor for optimal BS control as pt continues to advance diet post-hospitalization. Encouraged ongoing BS monitoring and use of Novolog SS as directed.     PLAN:              No medication changes    I spent 15 minutes with this patient today. All changes were made via collaborative practice agreement with Marivel Barcenas. A copy of the visit note was provided to the patient's primary care provider.    Will follow up in 1 month.    The patient was sent via Tripnary a summary of these recommendations as an after visit summary.    Conchita Toledo, PharmD, BCACP

## 2018-01-03 NOTE — TELEPHONE ENCOUNTER
Reason for Call:  Other prescription    Detailed comments: Incoming call from pt's spouse requesting a all back from Conchita pharmacist. Pt was  instructed to discontinue Sensipar after discharging from the hospital on 12/25/17.  Pt is wondering when she can resume medication.     Phone Number Patient can be reached at: Home number on file [phone] (home)    Best Time: Anytime    Can we leave a detailed message on this number? YES    Call taken on 1/3/2018 at 3:51 PM by Paige Molina

## 2018-01-03 NOTE — Clinical Note
I think I'd like her vitamin D level a little higher - did you run across any recommendations from nephrology for a goal range? Otherwise maybe we can have them switch to a 5000 unit capsule to take daily?

## 2018-01-05 DIAGNOSIS — Z79.60 LONG-TERM USE OF IMMUNOSUPPRESSANT MEDICATION: ICD-10-CM

## 2018-01-05 DIAGNOSIS — Z94.0 KIDNEY TRANSPLANT RECIPIENT: Primary | ICD-10-CM

## 2018-01-05 RX ORDER — TACROLIMUS 0.5 MG/1
0.5 CAPSULE ORAL 2 TIMES DAILY
Qty: 60 CAPSULE | Refills: 11 | Status: SHIPPED | OUTPATIENT
Start: 2018-01-05 | End: 2018-01-08

## 2018-01-05 RX ORDER — TACROLIMUS 1 MG/1
2 CAPSULE ORAL 2 TIMES DAILY
Qty: 120 CAPSULE | Refills: 11 | Status: SHIPPED | OUTPATIENT
Start: 2018-01-05 | End: 2018-01-08

## 2018-01-05 NOTE — LETTER
PHYSICIAN ORDERS      DATE & TIME ISSUED: 2018 4:58 PM  PATIENT NAME: Gem Jade   : 1945     [facility] MR# [if applicable]: [id number]     DIAGNOSIS:  Kidney transplant  ICD-9 CODE: Z94.0      Please increase tacrolimus dose to 2.5 mg twice daily and recheck level in 2-3 weeks ensuring an accurate twelve hour lab draw ( 12 hours in between last dose and morning blood draw).    Tacrolimus level     Any questions please call: [phone]    Please fax these results to [phone].    .

## 2018-01-05 NOTE — TELEPHONE ENCOUNTER
Call placed to patient: Spoke with patient  he confirms that level was within 12-13 hours and verbalize understanding to increase dose to 2.5 mg twice daily and recheck level with next lab draw. Order/rx sent

## 2018-01-05 NOTE — TELEPHONE ENCOUNTER
Issue: Tac level low at 3.  Goal is 4-6 as Gem Jade is only on tac and prednisone for immunosuppression.    Plan: Please call patient's  to confirm that this was a 12 hour trough level.  If it was, please increase tac to 2.5 mg bid.  Recheck tac level with next scheduled labs.

## 2018-01-07 NOTE — PATIENT INSTRUCTIONS
Recommendations from today's MTM visit:                                                        1. Continue off Sensipar     2. Please follow-up with the transplant coordinator about the lab results    Next MTM visit: 1 month by phone    To schedule another MTM appointment, please call the clinic directly or you may call the MTM scheduling line at [phone] or toll-free at [phone].     My Clinical Pharmacist's contact information:                                                      It was a pleasure seeing you today!  Please feel free to contact me with any questions or concerns you have.      Conchita Toledo, PharmD, BCACP     You may receive a survey about the MTM services you received.  I would appreciate your feedback to help me serve you better in the future. Please fill it out and return it when you can. Your comments will be anonymous.

## 2018-01-08 ENCOUNTER — VIRTUAL VISIT (OUTPATIENT)
Dept: FAMILY MEDICINE | Facility: CLINIC | Age: 73
End: 2018-01-08
Payer: COMMERCIAL

## 2018-01-08 ENCOUNTER — VIRTUAL VISIT (OUTPATIENT)
Dept: BEHAVIORAL HEALTH | Facility: CLINIC | Age: 73
End: 2018-01-08
Payer: COMMERCIAL

## 2018-01-08 DIAGNOSIS — Z97.8 FOLEY CATHETER IN PLACE: ICD-10-CM

## 2018-01-08 DIAGNOSIS — Z79.60 LONG-TERM USE OF IMMUNOSUPPRESSANT MEDICATION: ICD-10-CM

## 2018-01-08 DIAGNOSIS — J00 ACUTE NASOPHARYNGITIS: ICD-10-CM

## 2018-01-08 DIAGNOSIS — F43.23 ADJUSTMENT DISORDER WITH MIXED ANXIETY AND DEPRESSED MOOD: Primary | ICD-10-CM

## 2018-01-08 DIAGNOSIS — Z09 HOSPITAL DISCHARGE FOLLOW-UP: Primary | ICD-10-CM

## 2018-01-08 DIAGNOSIS — Z94.0 KIDNEY TRANSPLANT RECIPIENT: ICD-10-CM

## 2018-01-08 DIAGNOSIS — E11.42 DIABETIC POLYNEUROPATHY ASSOCIATED WITH TYPE 2 DIABETES MELLITUS (H): ICD-10-CM

## 2018-01-08 DIAGNOSIS — E56.9 VITAMIN DEFICIENCY: ICD-10-CM

## 2018-01-08 PROCEDURE — 98968 PH1 ASSMT&MGMT NQHP 21-30: CPT | Performed by: NURSE PRACTITIONER

## 2018-01-08 PROCEDURE — 99207 ZZC NO CHARGE LOS: CPT | Performed by: SOCIAL WORKER

## 2018-01-08 RX ORDER — TACROLIMUS 0.5 MG/1
0.5 CAPSULE ORAL 2 TIMES DAILY
Qty: 60 CAPSULE | Refills: 11 | Status: SHIPPED | OUTPATIENT
Start: 2018-01-08 | End: 2018-01-11

## 2018-01-08 RX ORDER — TACROLIMUS 1 MG/1
2 CAPSULE ORAL 2 TIMES DAILY
Qty: 120 CAPSULE | Refills: 11 | Status: SHIPPED | OUTPATIENT
Start: 2018-01-08 | End: 2018-01-18

## 2018-01-08 RX ORDER — BACLOFEN 10 MG/1
10 TABLET ORAL 3 TIMES DAILY PRN
Qty: 90 TABLET | Refills: 4 | COMMUNITY
Start: 2018-01-08 | End: 2018-08-10 | Stop reason: ALTCHOICE

## 2018-01-08 RX ORDER — CHOLECALCIFEROL (VITAMIN D3) 50 MCG
4000 TABLET ORAL 2 TIMES DAILY
Qty: 100 TABLET | Refills: 3 | COMMUNITY
Start: 2018-01-08 | End: 2018-01-11

## 2018-01-08 RX ORDER — GABAPENTIN 300 MG/1
900 CAPSULE ORAL 2 TIMES DAILY
Qty: 360 CAPSULE | Refills: 3 | Status: ON HOLD | OUTPATIENT
Start: 2018-01-08 | End: 2018-01-25

## 2018-01-08 NOTE — Clinical Note
David Gamble, Note looks good - I made a few changes to the narrative if you want to review. Let me know if you have any questions. Thanks again for sitting with Klaudia for the visit! Claude

## 2018-01-08 NOTE — MR AVS SNAPSHOT
After Visit Summary   1/8/2018    Gem Jade    MRN: [id number]           Patient Information     Date Of Birth          1945        Visit Information        Provider Department      1/8/2018 3:30 PM Juan Luis Reynolds [facility]        Today's Diagnoses     Adjustment disorder with mixed anxiety and depressed mood    -  1       Follow-ups after your visit        Your next 10 appointments already scheduled     Feb 05, 2018  3:30 PM CST   Telephone Visit with ANTHONY Antonio CNP   [facility] [place] [place]    [address] [place] [address]  [address] [place] [id number] [phone]           Note: this is not an onsite visit; there is no need to come to the facility.            Feb 06, 2018  3:30 PM CST   TELEMEDICINE with Conchita Toledo [facility] ([facility])    [address] [place] [id number] [phone]           Note: this is not an onsite visit; there is no need to come to the facility.              Future tests that were ordered for you today     Open Future Orders        Priority Expected Expires Ordered    **Basic metabolic panel FUTURE anytime Routine 1/11/2018 1/8/2019 1/8/2018    **Vitamin B12 FUTURE 2mo Routine 3/9/2018 5/8/2018 1/8/2018            Who to contact     If you have questions or need follow up information about today's clinic visit or your schedule please [place] [facility] [phone].  Normal or non-critical lab and imaging results will be communicated to you by MyChart, letter or phone within 4 business days after the clinic has received the results. If you do not hear from us within 7 days, please contact the clinic through MyChart or phone. If you have a critical or abnormal lab result, we will notify you by phone  as soon as possible.  Submit refill requests through [employer] or call your pharmacy and they will forward the refill request to us. Please allow 3 business days for your refill to be completed.          Additional Information About Your Visit        [employer] Information     [employer] gives you secure access to your electronic health record. If you see a primary care provider, you can also send messages to your care team and make appointments. If you have questions, please call your primary care clinic.  If you do not have a primary care provider, please call [phone] and they will assist you.        Care EveryWhere ID     This is your Care EveryWhere ID. This could be used by other organizations to access your [place] medical records  [id number]         Blood Pressure from Last 3 Encounters:   12/25/17 134/51   12/06/17 110/68   11/21/17 130/70    Weight from Last 3 Encounters:   12/23/17 238 lb (108 kg)   11/17/17 224 lb 1.6 oz (101.7 kg)   11/08/17 228 lb 2.8 oz (103.5 kg)              Today, you had the following     No orders found for display         Today's Medication Changes          These changes are accurate as of: 1/8/18 11:59 PM.  If you have any questions, ask your nurse or doctor.               Start taking these medicines.        Dose/Directions    guaiFENesin 100 MG/5ML Syrp   Commonly known as:  ROBITUSSIN   Used for:  Acute nasopharyngitis   Started by:  Marivel Barcenas APRN CNP        Dose:  10-20 mL   Take 10-20 mLs by mouth every 4 hours as needed for cough   Quantity:  560 mL   Refills:  3         These medicines have changed or have updated prescriptions.        Dose/Directions    insulin aspart 100 UNIT/ML injection   Commonly known as:  NovoLOG FLEXPEN   This may have changed:  additional instructions   Used for:  Type 2 diabetes mellitus with diabetic polyneuropathy, with long-term current use of insulin (H)        Inject 10 units under the skin 2 times daily before meals as  directed. Add sliding scale as needed. Avg use 25 units per day   Quantity:  15 mL   Refills:  11       insulin glargine 100 UNIT/ML injection   Commonly known as:  LANTUS SOLOSTAR   This may have changed:    - how much to take  - how to take this  - when to take this  - additional instructions   Used for:  Type 2 diabetes mellitus with diabetic polyneuropathy, with long-term current use of insulin (H)        Inject 24 units under the skin daily.   Quantity:  15 mL   Refills:  11       vitamin D 2000 UNITS tablet   This may have changed:    - how much to take  - when to take this   Used for:  Vitamin deficiency   Changed by:  Marivel Barcenas, ANTHONY CNP        Dose:  2000 Units   Take 2,000 Units by mouth 3 times daily   Quantity:  100 tablet   Refills:  3            Where to get your medicines      These medications were sent to [facility] - [place], [place] - [address]  [address] [facility], [facility] [address]    Hours:  Mon-Fri 8:30am-5:00pm Toll Free [phone] Phone:  [phone]     gabapentin 300 MG capsule    guaiFENesin 100 MG/5ML Syrp    lidocaine 2 % topical gel    tacrolimus 0.5 MG capsule    tacrolimus 1 MG capsule                Primary Care Provider Office Phone # Fax #    ANTHONY Antonio [phone]       [address]  [facility] [address]        Equal Access to Services     CARLOS ENRIQUE GREEN AH: Hadii jairo ku hadasho Sojorgeali, waaxda luqadaha, qaybta kaalmada adeegyada, jessica carrion hayjessie aguilar . So [facility] [phone].    ATENCIÓN: Si habla español, tiene a peguero disposición servicios gratuitos de asistencia lingüística. Llame al [phone].    We comply with applicable federal civil rights laws and [place] laws. We do not discriminate on the basis of race, color, national origin, age, disability, sex, sexual orientation, or gender identity.            Thank you!     Thank you for choosing  [facility]  for your care. Our goal is always to provide you with excellent care. Hearing back from our patients is one way we can continue to improve our services. Please take a few minutes to complete the written survey that you may receive in the mail after your visit with us. Thank you!             Your Updated Medication List - Protect others around you: Learn how to safely use, store and throw away your medicines at www.disposemymeds.org.          This list is accurate as of: 1/8/18 11:59 PM.  Always use your most recent med list.                   Brand Name Dispense Instructions for use Diagnosis    ACCU-CHEK COMPACT PLUS test strip   Generic drug:  blood glucose monitoring     100 strip    Use to test blood sugar 3 times daily or as directed.  Ok to substitute alternative if insurance prefers.        ACETAMINOPHEN PO      Take 325-650 mg by mouth every 4 hours as needed.        aspirin 81 MG chewable tablet     90 tablet    Take 1 tablet (81 mg) by mouth daily Starting 1 week after your kidney biopsy    Pulmonary embolism and infarction (H), Diabetes mellitus with background retinopathy (H)       baclofen 10 MG tablet    LIORESAL    90 tablet    Take 1 tablet (10 mg) by mouth 3 times daily as needed for muscle spasms        cloNIDine 0.1 MG tablet    CATAPRES    180 tablet    TAKE ONE TABLET BY MOUTH TWICE A DAY    HTN, goal below 140/90       Cranberry 400 MG Tabs      Take 400 mg by mouth 2 times daily        cyanocobalamin 1000 MCG/ML injection    VITAMIN B12    3 mL    Inject 1 mL (1,000 mcg) into the muscle every 30 days    Iron deficiency anemia, unspecified iron deficiency anemia type       diphenoxylate-atropine 2.5-0.025 MG per tablet    LOMOTIL    30 tablet    Take 1 tablet by mouth 4 times daily as needed for diarrhea    Diarrhea of presumed infectious origin       famotidine 20 MG tablet    PEPCID    60 tablet    Take 1 tablet (20 mg) by mouth 2 times daily         gabapentin 300 MG capsule    NEURONTIN    360 capsule    Take 3 capsules (900 mg) by mouth 2 times daily For pain    Diabetic polyneuropathy associated with type 2 diabetes mellitus (H)       guaiFENesin 100 MG/5ML Syrp    ROBITUSSIN    560 mL    Take 10-20 mLs by mouth every 4 hours as needed for cough    Acute nasopharyngitis       insulin aspart 100 UNIT/ML injection    NovoLOG FLEXPEN    15 mL    Inject 10 units under the skin 2 times daily before meals as directed. Add sliding scale as needed. Avg use 25 units per day    Type 2 diabetes mellitus with diabetic polyneuropathy, with long-term current use of insulin (H)       insulin glargine 100 UNIT/ML injection    LANTUS SOLOSTAR    15 mL    Inject 24 units under the skin daily.    Type 2 diabetes mellitus with diabetic polyneuropathy, with long-term current use of insulin (H)       insulin pen needle 30G X 8 MM     100 each    Use as directed with Lantus    Type 2 diabetes mellitus with diabetic nephropathy (H)       labetalol 300 MG tablet    NORMODYNE    120 tablet    Take 150 mg by mouth 2 times daily Take 1/2 tablet (150mg) =300mg. Hold for systolic BP less than 120.    Benign essential hypertension, Kidney replaced by transplant       lactobacillus rhamnosus (GG) capsule     60 capsule    Take 1 capsule by mouth 2 times daily    Diarrhea       levothyroxine 50 MCG tablet    SYNTHROID/LEVOTHROID    90 tablet    TAKE ONE TABLET BY MOUTH EVERY DAY    Other specified hypothyroidism       lidocaine 2 % topical gel    XYLOCAINE    30 mL    Apply topically as needed for moderate pain 10 ML every 3 weeks    Johnson catheter in place       loratadine 10 MG tablet    CLARITIN    90 tablet    Take 1 tablet (10 mg) by mouth daily    Acute nasopharyngitis       meclizine 12.5 MG tablet    ANTIVERT    30 tablet    Take 1 tablet (12.5 mg) by mouth 3 times daily as needed for dizziness    Disorientation       ondansetron 4 MG ODT tab    ZOFRAN ODT    20 tablet    Take 1-2  tablets (4-8 mg) by mouth every 8 hours as needed for nausea    Nausea       order for DME     3 catheter    Equipment being ordered: Johnson Catheters - 18 french    Johnson catheter status       * order for DME     100 pad    Equipment being ordered: Blue Chux Please send to [facility]    Fecal incontinence       * order for DME     1 each    Equipment being ordered: Ahuja FX CPAP interface (nasal pillows), size XS.    Obstructive sleep apnea syndrome       * order for DME     12 each    Equipment being ordered: Silver Impregnated catheters HX of frequent UTI    Recurrent UTI       * order for DME     1 Device    High back guerrero wheel chair MICHELLE 99 DX: morbid obesity, generalized weakness, physical deconditioning, chronic vertigo    Morbid obesity, unspecified obesity type (H), Vertigo, Generalized muscle weakness, Physical deconditioning       pravastatin 10 MG tablet    PRAVACHOL    90 tablet    TAKE ONE TABLET BY MOUTH EVERY DAY    Hyperlipidemia LDL goal <100       predniSONE 5 MG tablet    DELTASONE    90 tablet    Take 1 tablet (5 mg) by mouth daily    Kidney replaced by transplant       simethicone 125 MG Chew chewable tablet    MYLICON    120 tablet    Take 1 tablet (125 mg) by mouth as needed for intestinal gas        * tacrolimus 1 MG capsule    GENERIC EQUIVALENT    120 capsule    Take 2 capsules (2 mg) by mouth 2 times daily Total dose 2.5 mg twice daily    Kidney transplant recipient, Long-term use of immunosuppressant medication       * tacrolimus 0.5 MG capsule    GENERIC EQUIVALENT    60 capsule    Take 1 capsule (0.5 mg) by mouth 2 times daily Total dose 2.5 mg twice daily    Kidney transplant recipient, Long-term use of immunosuppressant medication       TraMADol HCl 50 MG Tbdp     90 tablet    Take 50 mg by mouth 3 times daily as needed    Chronic shoulder pain, unspecified laterality       TRILEPTAL 300 MG tablet   Generic drug:  OXcarbazepine     180 tablet    Take 1 tablet (300 mg) by mouth 2  times daily For pain    Diabetic polyneuropathy associated with diabetes mellitus due to underlying condition (H)       vitamin D 2000 UNITS tablet     100 tablet    Take 2,000 Units by mouth 3 times daily    Vitamin deficiency       * Notice:  This list has 6 medication(s) that are the same as other medications prescribed for you. Read the directions carefully, and ask your doctor or other care provider to review them with you.

## 2018-01-08 NOTE — PATIENT INSTRUCTIONS
Today I have ordered some lab work for you and your [facility] RN will draw    Today I have reordered some of your medicines    Please start emerg-C 1,000mg 2x daily, elderberry and guiatuss syrup    Please call if you have any issues     I will call you again in one month

## 2018-01-08 NOTE — PROGRESS NOTES
"Gem Jade is a 72 year old female who is being evaluated via a billable telephone visit.      The patient has been notified of following:     \"This telephone visit will be conducted via a call between you and your physician/provider. We have found that certain health care needs can be provided without the need for a physical exam.  This service lets us provide the care you need with a short phone conversation.  If a prescription is necessary we can send it directly to your pharmacy.  If lab work is needed we can place an order for that and you can then stop by our lab to have the test done at a later time.    We will bill your insurance company for this service.  Please check with your medical insurance if this type of visit is covered. You may be responsible for the cost of this type of visit if insurance coverage is denied.  The typical cost is $30 (10min), $59 (11-20min) and $85 (21-30min).  Most often these visits are shorter than 10 minutes.    If during the course of the call the physician/provider feels a telephone visit is not appropriate, you will not be charged for this service.\"       Consent has been obtained for this service by care team member: {YES/NO:[id number]}. See the scanned image in the medical record.  SUBJECTIVE:     Gem Jade complains of  No chief complaint on file.      I have reviewed and updated the patient's Past Medical History, Social History, Family History and Medication List.    ALLERGIES  Bactrim ds [sulfamethoxazole w/trimethoprim]; Atorvastatin calcium; Codeine; Darvocet [propoxyphene n-apap]; Hydromorphone; Levofloxacin; Morphine; Niaspan [niacin]; Percocet [oxycodone-acetaminophen]; and Vicodin [hydrocodone-acetaminophen]    *** (MA signature)    Additional provider notes: {ACUTE SUPERBRIEF LIST:500375}  {Chronic Problem SUPERLIST:252289}  OBJECTIVE:     {Diag Picklist:439502}    {FOLLOW UP PLANS:449820}    I have reviewed the note as documented above.  This accurately " captures the substance of my conversation with the patient,  ***    Total time of call between patient and provider was *** minutes     Thalia Cazares

## 2018-01-08 NOTE — MR AVS SNAPSHOT
After Visit Summary   1/8/2018    Gem Jade    MRN: [id number]           Patient Information     Date Of Birth          1945        Visit Information        Provider Department      1/8/2018 3:30 PM Marivel Barcenas APRN CNP [facility]        Today's Diagnoses     Hospital discharge follow-up    -  1    Kidney transplant recipient        Long-term use of immunosuppressant medication        Diabetic polyneuropathy associated with type 2 diabetes mellitus (H)        Vitamin deficiency        Johnson catheter in place        Acute nasopharyngitis          Care Instructions    Today I have ordered some lab work for you and your [facility] RN will draw    Today I have reordered some of your medicines    Please start emerg-C 1,000mg 2x daily, elderberry and guiatuss syrup    Please call if you have any issues     I will call you again in one month          Follow-ups after your visit        Follow-up notes from your care team     Return in about 4 weeks (around 2/5/2018).      Your next 10 appointments already scheduled     Feb 05, 2018  3:30 PM CST   Telephone Visit with ANTHONY Antonio CNP   [facility] ([facility])    [address] [place] [id number] [phone]           Note: this is not an onsite visit; there is no need to come to the facility.            Feb 06, 2018  3:30 PM CST   TELEMEDICINE with Conchita Toledo [facility] ([facility])    [address] [place] [id number] [phone]           Note: this is not an onsite visit; there is no need to come to the facility.              Who to contact     If you have questions or need follow up information about today's clinic visit or your schedule please contact [facility]  directly at [phone].  Normal or non-critical lab and imaging results will be communicated to you by MyChart, letter or phone within 4 business days after the clinic has received the results. If you do not hear from us within 7 days, please contact the clinic through [facility] or phone. If you have a critical or abnormal lab result, we will notify you by phone as soon as possible.  Submit refill requests through [employer] or call your pharmacy and they will forward the refill request to us. Please allow 3 business days for your refill to be completed.          Additional Information About Your Visit        [facility] Information     [employer] gives you secure access to your electronic health record. If you see a primary care provider, you can also send messages to your care team and make appointments. If you have questions, please call your primary care clinic.  If you do not have a primary care provider, please call [phone] and they will assist you.        Care EveryWhere ID     This is your Care EveryWhere ID. This could be used by other organizations to access your [place] medical records  [id number]         Blood Pressure from Last 3 Encounters:   12/25/17 134/51   12/06/17 110/68   11/21/17 130/70    Weight from Last 3 Encounters:   12/23/17 238 lb (108 kg)   11/17/17 224 lb 1.6 oz (101.7 kg)   11/08/17 228 lb 2.8 oz (103.5 kg)                 Today's Medication Changes          These changes are accurate as of: 1/8/18 11:59 PM.  If you have any questions, ask your nurse or doctor.               Start taking these medicines.        Dose/Directions    guaiFENesin 100 MG/5ML Syrp   Commonly known as:  ROBITUSSIN   Used for:  Acute nasopharyngitis   Started by:  Marivel Barcenas APRN CNP        Dose:  10-20 mL   Take 10-20 mLs by mouth every 4 hours as needed for cough   Quantity:  560 mL   Refills:  3         These medicines have changed or have updated prescriptions.        Dose/Directions     insulin aspart 100 UNIT/ML injection   Commonly known as:  NovoLOG FLEXPEN   This may have changed:  additional instructions   Used for:  Type 2 diabetes mellitus with diabetic polyneuropathy, with long-term current use of insulin (H)        Inject 10 units under the skin 2 times daily before meals as directed. Add sliding scale as needed. Avg use 25 units per day   Quantity:  15 mL   Refills:  11       insulin glargine 100 UNIT/ML injection   Commonly known as:  LANTUS SOLOSTAR   This may have changed:    - how much to take  - how to take this  - when to take this  - additional instructions   Used for:  Type 2 diabetes mellitus with diabetic polyneuropathy, with long-term current use of insulin (H)        Inject 24 units under the skin daily.   Quantity:  15 mL   Refills:  11       vitamin D 2000 UNITS tablet   This may have changed:    - how much to take  - when to take this   Used for:  Vitamin deficiency   Changed by:  Marivel Barcenas, ANTHONY CNP        Dose:  2000 Units   Take 2,000 Units by mouth 3 times daily   Quantity:  100 tablet   Refills:  3            Where to get your medicines      These medications were sent to [facility] - [place], [place] - [address] [place] [address]  [address] [facility] [address]    Hours:  Mon-Fri 8:30am-5:00pm Toll Free [phone] Phone:  [phone]     gabapentin 300 MG capsule    guaiFENesin 100 MG/5ML Syrp    lidocaine 2 % topical gel    tacrolimus 0.5 MG capsule    tacrolimus 1 MG capsule                Primary Care Provider Office Phone # Fax #    Marivel Barcenas, ANTHONY [phone]       [address] [facility] [address]  [facility] [address]        Equal Access to Services     CARLOS ENRIQUE GREEN AH: Marques Goodman, wamahesh dai, qachino kaalmikie jimenez, jessica roman. So [facility] [phone].    ATENCIÓN: Si habla español, tiene a peguero disposición servicios gratuitos de  asistencia lingüística. Carmine al [phone].    We comply with applicable federal civil rights laws and [place] laws. We do not discriminate on the basis of race, color, national origin, age, disability, sex, sexual orientation, or gender identity.            Thank you!     Thank you for choosing [facility]  for your care. Our goal is always to provide you with excellent care. Hearing back from our patients is one way we can continue to improve our services. Please take a few minutes to complete the written survey that you may receive in the mail after your visit with us. Thank you!             Your Updated Medication List - Protect others around you: Learn how to safely use, store and throw away your medicines at www.disposemymeds.org.          This list is accurate as of: 1/8/18 11:59 PM.  Always use your most recent med list.                   Brand Name Dispense Instructions for use Diagnosis    ACCU-CHEK COMPACT PLUS test strip   Generic drug:  blood glucose monitoring     100 strip    Use to test blood sugar 3 times daily or as directed.  Ok to substitute alternative if insurance prefers.        ACETAMINOPHEN PO      Take 325-650 mg by mouth every 4 hours as needed.        aspirin 81 MG chewable tablet     90 tablet    Take 1 tablet (81 mg) by mouth daily Starting 1 week after your kidney biopsy    Pulmonary embolism and infarction (H), Diabetes mellitus with background retinopathy (H)       baclofen 10 MG tablet    LIORESAL    90 tablet    Take 1 tablet (10 mg) by mouth 3 times daily as needed for muscle spasms        cloNIDine 0.1 MG tablet    CATAPRES    180 tablet    TAKE ONE TABLET BY MOUTH TWICE A DAY    HTN, goal below 140/90       Cranberry 400 MG Tabs      Take 400 mg by mouth 2 times daily        cyanocobalamin 1000 MCG/ML injection    VITAMIN B12    3 mL    Inject 1 mL (1,000 mcg) into the muscle every 30 days    Iron deficiency anemia, unspecified iron deficiency  anemia type       diphenoxylate-atropine 2.5-0.025 MG per tablet    LOMOTIL    30 tablet    Take 1 tablet by mouth 4 times daily as needed for diarrhea    Diarrhea of presumed infectious origin       famotidine 20 MG tablet    PEPCID    60 tablet    Take 1 tablet (20 mg) by mouth 2 times daily        gabapentin 300 MG capsule    NEURONTIN    360 capsule    Take 3 capsules (900 mg) by mouth 2 times daily For pain    Diabetic polyneuropathy associated with type 2 diabetes mellitus (H)       guaiFENesin 100 MG/5ML Syrp    ROBITUSSIN    560 mL    Take 10-20 mLs by mouth every 4 hours as needed for cough    Acute nasopharyngitis       insulin aspart 100 UNIT/ML injection    NovoLOG FLEXPEN    15 mL    Inject 10 units under the skin 2 times daily before meals as directed. Add sliding scale as needed. Avg use 25 units per day    Type 2 diabetes mellitus with diabetic polyneuropathy, with long-term current use of insulin (H)       insulin glargine 100 UNIT/ML injection    LANTUS SOLOSTAR    15 mL    Inject 24 units under the skin daily.    Type 2 diabetes mellitus with diabetic polyneuropathy, with long-term current use of insulin (H)       insulin pen needle 30G X 8 MM     100 each    Use as directed with Lantus    Type 2 diabetes mellitus with diabetic nephropathy (H)       labetalol 300 MG tablet    NORMODYNE    120 tablet    Take 150 mg by mouth 2 times daily Take 1/2 tablet (150mg) =300mg. Hold for systolic BP less than 120.    Benign essential hypertension, Kidney replaced by transplant       lactobacillus rhamnosus (GG) capsule     60 capsule    Take 1 capsule by mouth 2 times daily    Diarrhea       levothyroxine 50 MCG tablet    SYNTHROID/LEVOTHROID    90 tablet    TAKE ONE TABLET BY MOUTH EVERY DAY    Other specified hypothyroidism       lidocaine 2 % topical gel    XYLOCAINE    30 mL    Apply topically as needed for moderate pain 10 ML every 3 weeks    Johnson catheter in place       loratadine 10 MG tablet     CLARITIN    90 tablet    Take 1 tablet (10 mg) by mouth daily    Acute nasopharyngitis       meclizine 12.5 MG tablet    ANTIVERT    30 tablet    Take 1 tablet (12.5 mg) by mouth 3 times daily as needed for dizziness    Disorientation       ondansetron 4 MG ODT tab    ZOFRAN ODT    20 tablet    Take 1-2 tablets (4-8 mg) by mouth every 8 hours as needed for nausea    Nausea       order for DME     3 catheter    Equipment being ordered: Johnson Catheters - 18 french    Johnson catheter status       * order for DME     100 pad    Equipment being ordered: Blue Adrián Please send to [facility]    Fecal incontinence       * order for DME     1 each    Equipment being ordered: EvalYou FX CPAP interface (nasal pillows), size XS.    Obstructive sleep apnea syndrome       * order for DME     12 each    Equipment being ordered: Silver Impregnated catheters HX of frequent UTI    Recurrent UTI       * order for DME     1 Device    High back guerrero wheel chair MICHELLE 99 DX: morbid obesity, generalized weakness, physical deconditioning, chronic vertigo    Morbid obesity, unspecified obesity type (H), Vertigo, Generalized muscle weakness, Physical deconditioning       pravastatin 10 MG tablet    PRAVACHOL    90 tablet    TAKE ONE TABLET BY MOUTH EVERY DAY    Hyperlipidemia LDL goal <100       predniSONE 5 MG tablet    DELTASONE    90 tablet    Take 1 tablet (5 mg) by mouth daily    Kidney replaced by transplant       simethicone 125 MG Chew chewable tablet    MYLICON    120 tablet    Take 1 tablet (125 mg) by mouth as needed for intestinal gas        * tacrolimus 1 MG capsule    GENERIC EQUIVALENT    120 capsule    Take 2 capsules (2 mg) by mouth 2 times daily Total dose 2.5 mg twice daily    Kidney transplant recipient, Long-term use of immunosuppressant medication       * tacrolimus 0.5 MG capsule    GENERIC EQUIVALENT    60 capsule    Take 1 capsule (0.5 mg) by mouth 2 times daily Total dose 2.5 mg twice daily    Kidney transplant  recipient, Long-term use of immunosuppressant medication       TraMADol HCl 50 MG Tbdp     90 tablet    Take 50 mg by mouth 3 times daily as needed    Chronic shoulder pain, unspecified laterality       TRILEPTAL 300 MG tablet   Generic drug:  OXcarbazepine     180 tablet    Take 1 tablet (300 mg) by mouth 2 times daily For pain    Diabetic polyneuropathy associated with diabetes mellitus due to underlying condition (H)       vitamin D 2000 UNITS tablet     100 tablet    Take 2,000 Units by mouth 3 times daily    Vitamin deficiency       * Notice:  This list has 6 medication(s) that are the same as other medications prescribed for you. Read the directions carefully, and ask your doctor or other care provider to review them with you.

## 2018-01-08 NOTE — PROGRESS NOTES
"Gem Jade is a 72 year old female who is being evaluated via a billable telephone visit.      The patient has been notified of following:     \"This telephone visit will be conducted via a call between you and your physician/provider. We have found that certain health care needs can be provided without the need for a physical exam.  This service lets us provide the care you need with a short phone conversation.  If a prescription is necessary we can send it directly to your pharmacy.  If lab work is needed we can place an order for that and you can then stop by our lab to have the test done at a later time.    We will bill your insurance company for this service.  Please check with your medical insurance if this type of visit is covered. You may be responsible for the cost of this type of visit if insurance coverage is denied.  The typical cost is $30 (10min), $59 (11-20min) and $85 (21-30min).  Most often these visits are shorter than 10 minutes.    If during the course of the call the physician/provider feels a telephone visit is not appropriate, you will not be charged for this service.\"       Consent has been obtained for this service by care team member: yes. See the scanned image in the medical record.  SUBJECTIVE:     Gem Jade complains of  Hospital follow up  Chief Complaint   Patient presents with     RECHECK       I have reviewed and updated the patient's Past Medical History, Social History, Family History and Medication List.    ALLERGIES  Bactrim ds [sulfamethoxazole w/trimethoprim]; Atorvastatin calcium; Codeine; Darvocet [propoxyphene n-apap]; Hydromorphone; Levofloxacin; Morphine; Niaspan [niacin]; Percocet [oxycodone-acetaminophen]; and Vicodin [hydrocodone-acetaminophen]    Thalia Cazares MA    URI SX  Duration of complaint: sx started about one week ago, cough, mucous, sore throat, low grade temp, slight improvement, discussed sx relief        Hospital Follow-up " Visit:    Hospital/Nursing Home/IP Rehab Facility:  [facility]  Date of Admission: 12/19/2017  Date of Discharge: 12/25/2017  Reason(s) for Admission: Sepsis            Problems taking medications regularly:  None       Medication changes since discharge: stopped her senispar, and increased her tacrolimus to 2.5mg 2x daily,        Problems adhering to non-medication therapy:  Suppose to start PT for strength training next week     Summary of hospitalization:  [facility] discharge summary reviewed  Diagnostic Tests/Treatments reviewed.  Follow up needed: labs ordered tacrolimus, BMP and B12  Other Healthcare Providers Involved in Patient s Care:         Homecare  Update since discharge: stable. Recent URI which has slowed recovery but mentation at baseline, no UTI sx, DM stable, appetite almost back to normal, has moved downstairs now so it will be easier to get out once she can get up in her WC    Post Discharge Medication Reconciliation: discharge medications reconciled and changed, per note/orders (see AVS).  Plan of care communicated with patient and caregiver     Coding guidelines for this visit:  Type of Medical   Decision Making Face-to-Face Visit       within 7 Days of discharge Face-to-Face Visit        within 14 days of discharge   Moderate Complexity [id number]   High Complexity [id number]          Date of Admission:                                 12/19/2017  Date of Discharge:                                  12/25/2017  Admitting Physician:                                       Jeannette Hurtado MD  Discharge Physician:                 Leonor Osborn MD  Discharging Service:                    Hospitalist      Primary Provider: Marivel Barcenas  Primary Care Physician Phone Number: [phone]          Discharge Diagnoses/Problem Oriented Hospital Course (Providers):    Gem Jade was admitted on 12/19/2017 by Jeannette Hurtado MD and I would refer you to their history and  physical.  The following problems were addressed during her hospitalization:  72 year old female who was admitted on 12/19/2017 for evaluation and management of altered mental status. She was intubated for airway protection. Work-up was notable for concern of sepsis secondary to urinary tract infection; Vancomycin and Meropenem IV were initiated. Her vital signs stabilized and she did not require vasopressor support. She was successfully extubated on 12/21/2017 and was currently not requiring oxygen. Vancomycin was discontinued on 12/21/2017. Post-extubation she has been alert and oriented, but paranoid of staff. She was initially refusing food and oral medications. Tacrolimus and steroid dosing is being converted to IV. Blood and urine cultures have been negative-to-date. Infectious Disease has been consulted for multiple similar admissions and prior history of VRE, ESBL, and C. Dif. She is status-post renal transplant in 1999  She improved a lot on day of discharge and oral intake improved and mental status improved      Sepsis, secondary to urinary tract infection, resolved  Chronic urinary tract infection  Neurogenic bladder, requiring chronic indwelling Johnson  Personal history of klebsiella, ESBL, VRE, and citrobacter freundii complex UTI  Personal history of candiduria, November 2017  Mrs. Jade has had frequent episodes of altered mental status secondary to sepsis caused by urinary tract infection. She was recently admitted in November 2017 for same [facility] and [facility]. During her previous admission at [facility] ID considered long-term antibiotic, however elected to not use long-term therapy secondary to history of C.Dif. She continues on Meropenem 1- gram every 8-hours. She received Vancomycin from 12/19/2017-12/21/2017. Johnson was changed in ED on 12/19/2017. By report her  changes indwelling Johnson at home and has not been using sterile technique. Transferred out of ICU 12/22. Ur/blood cx NTD. Has remained  afebrile  - continue Meropenem while in hospital per ID--no antbx on discharge.  -She was discharged in stable condition as feeling a lot better and back to her baseline  -She was fully alert awake oriented and appeared competent and requested discharge and spoke to her family  -She was discharged in stable condition and  helped to make arrangements      Acute hypoxic respiratory failure, secondary to sepsis, resolving  Mrs. Jade was intubated in the ED on 12/19/2017 for airway protection and successfully extubated on 12/21/2017. She is currently requiring minimal to no oxygen.   Resolved      Status post renal transplant, 1999, immunosuppressed  Chronic renal failure, GFR 48  Nephrology has been consulted. Last tacrolimus level 3.8 on 12/11/2017. She has been refusing oral medications; steroids and tacrolimus has been transitioned to IV dosing. She has remained normo-hypertensive and was not given stress-dose steroids. UOP > 1- liter/day.  Appreciate Nephrology input  -continue  tacrolimus / steroids--change to po on 12/24.   -Patient calcium level is on the low side. She is on Sensipar for hyperparathyroidism. She has hyperparathyroidism, which nephrology suspect is due to severe vitamin D deficiency. D/C Sensipar and check vitamin D level and renal panel this week  -fax result to her nephrologist per nephrology recommendations      Acute encephalopathy secondary to sepsis, improving  Head CT on 12/19/2017 without acute pathology. EEG on 12/21/2017 without concern for seizures. Ammonia level is normal. - appreciate Psychiatric consult   - avoid additional opioids and sedating medication  - discontinued Haldol; okay to use Zyprexa for agitation   - continue home baclofen PRN  - continue gabapentin,resume 900- mg BID--pt requests --this is her home dose.      Hypertension, treated  Hyperlipidemia, treated  Chronic, stable.   -Continue meds      Personal history of deep vein thrombosis and pulmonary  embolism  Mrs. Jade is not on chronic anticoagulation. Bilateral calves are soft and non-tender, no hypoxia.  - prophylactic Lovenox given, SCDs      Diabetes mellitus, type II, hemoglobin A1c 6.7%, 12/19/2017, complicated by gastroparesis, retinopathy, and polyneuropathy  Chronic, stable.  -continue pta insulin as oral intake has improved      Anemia of chronic disease, range 10.6-11.8  Chronic, stable. No obvious signs of blood loss.      Thrombocytopenia likely secondary to immunosuppression and acute illness   Per EMR review typically low-normal. Has received Heparin, no concern for HIT at this time. No obvious signs of blood loss.       Impaired physical mobility and debility, chronic, secondary to polyneuropathy, vertigo, and Charcot foot   Per EMR review is bedridden with severely limited mobility.   Her  is her primary caretaker and she lives locally in her own home.   - continue PT/OT at home      DVT Prophylaxis:  - Enoxaparin (Lovenox) SQ given and Pneumatic Compression Devices    Nephrology asssessment in the hospital  ASSESSMENT:  Ms. Jade's renal transplant status appears to be good.  As mentioned above, her BUN and creatinine are better during this admission than recent values.  I do not recommend any changes in her immunosuppressive therapy.  In particular, it does not appear as though she needs an increase in corticosteroid support because of stress.  Her usual dose is 5 mg daily, which has been continued.  I would not change her tacrolimus dose either despite the recent slightly lower than ideal blood level.  I think that it would be prudent during the course of this hospitalization to repeat a trough level on the current therapy.     OBJECTIVE:     (Z09) Hospital discharge follow-up  (primary encounter diagnosis)  Comment: stable except for URI, almost back to bseline  Plan: **Basic metabolic panel FUTURE anytime,         **Vitamin B12 FUTURE 2mo        Will repeat labs, discussed  restarting Vit B12, discussed low Vit D, discussed increasing dose to 6,000IU daily    (Z94.0) Kidney transplant recipient  Comment: stable, followed by kidney transplant team in the hospital, recent adjustment in her tacrolimus dose   Plan: tacrolimus (GENERIC EQUIVALENT) 1 MG capsule,         tacrolimus (GENERIC EQUIVALENT) 0.5 MG capsule        Will repeat labs this week    (Z79.899) Long-term use of immunosuppressant medication  Comment: In hospital Senispar stopped, tacrolimus adjusted  Plan: tacrolimus (GENERIC EQUIVALENT) 1 MG capsule,         tacrolimus (GENERIC EQUIVALENT) 0.5 MG capsule        Repeat lab this week     (E11.42) Diabetic polyneuropathy associated with type 2 diabetes mellitus (H)  Comment: stable, taking gabapentin 900mg 2x daily, restarted when she came home, sugars have been good, insulin dosing the same, diet almost at baseline   Plan: gabapentin (NEURONTIN) 300 MG capsule        Reordered Gabapentin    (E56.9) Vitamin deficiency  Comment: dx with vit D def and hyperparathyroidism, currently taking 4,000IU daily  Plan: Cholecalciferol (VITAMIN D) 2000 UNITS tablet        Will increase dose to 6,000IU daily    (Z92.89) Johnson catheter in place  Comment: draining well, no symptoms of UTI, cath change monthly  Plan: lidocaine (XYLOCAINE) 2 % topical gel        Continue lidocaine gel with cath changes     (J00) Acute nasopharyngitis  Comment: both her and  have colds, doing nothing for it  Plan: guaiFENesin (ROBITUSSIN) 100 MG/5ML SYRP  Discussed rest,fluids, guiatuss syrup ordered, discussed immune boosting Elderberry and Vit C         Patient Instructions   Today I have ordered some lab work for you and your [facility] RN will draw    Today I have reordered some of your medicines    Please start emerg-C 1,000mg 2x daily, elderberry and guiatuss syrup    Please call if you have any issues     I will call you again in one month      I have reviewed the note as documented above.  This  accurately captures the substance of my conversation with the patient,  Klaudia Barcenas FNP  [facility]    Total time of call between patient and provider was 30 minutes

## 2018-01-09 NOTE — PROGRESS NOTES
Called [facility] to verify patient scheduled for labs to be drawn for Thursday, will await return call.  Cara Blevins RN

## 2018-01-09 NOTE — PROGRESS NOTES
"[facility]  January 8, 2018      Behavioral Health Clinician Progress Note    Patient Name: Gem Jade           Service Type:  Phone Visit - No charge LOS      Service Location:   Phone call (patient / identified key support person reached)     Session Start Time: 3:50PM  Session End Time: 4:11PM      Session Length: 16 - 37      Attendees: Patient, PCP and Spouse / Significant Other    Visit Activities (Refresh list every visit): NEW and Phone Encounter    Diagnostic Assessment Date: 3/22/2013  Treatment Plan Review Date: 4/17/17  See Flowsheets for today's PHQ-9 and LISSY-7 results  Previous PHQ-9: No flowsheet data found.  Previous LISSY-7: No flowsheet data found.    JUAN MANUEL LEVEL:  No flowsheet data found.    DATA  Extended Session (60+ minutes): No  Interactive Complexity: No  Crisis: No  [facility] Patient: No    Treatment Objective(s) Addressed in This Session:  Target Behavior(s): disease management/lifestyle changes Mental health    Depressed Mood: Decrease frequency and intensity of feeling down, depressed, hopeless  Improve quantity and quality of night time sleep / decrease daytime naps  Improve concentration, focus, and mindfulness in daily activities   Anxiety: will experience a reduction in anxiety    Current Stressors / Issues:    MSW Clinical Intern participated in the scheduled virtual visit at the PCP's request to check in on behavioral health needs. Shantelle reported a \"big improvement\" in her mood since being discharged from the hospital, where she was admitted to treat a UTI. She said she was very agitated and \"actually angry\" while in the hospital, explaining that this was very unlike her normal self. PCP and patient generally attribute mood changes to the UTI, as they resolved following treatment. Shantelle described her current mood as \"fine and normal\" and reported being able to focus on tasks that bring raheel to her such as shopping online to purchase gifts others. " She reported some frustration around not being able to do things that she wants because of complications with her wheelchair. Provided encouragement to focus on what she can do in the present to increase raheel and pleasure in her daily routine. Shantelle also reported having no hallucinations since her discharge from the hospital. Her  agreed with her assessment. Discussed in-home therapy as an option. Patient was receptive to this idea and said that would be helpful. Will send patient a letter with contact information for in-home therapy with the [facility] or [facility].     Progress on Treatment Objective(s) / Homework:  Minimal progress - PREPARATION (Decided to change - considering how); Intervened by negotiating a change plan and determining options / strategies for behavior change, identifying triggers, exploring social supports, and working towards setting a date to begin behavior change    Motivational Interviewing    MI Intervention: Expressed Empathy/Understanding, Supported Autonomy, Collaboration, Evocation, Permission to raise concern or advise, Open-ended questions, Reflections: simple and complex, Change talk (evoked) and Reframe     Change Talk Expressed by the Patient: Desire to change    Provider Response to Change Talk: E - Evoked more info from patient about behavior change, A - Affirmed patient's thoughts, decisions, or attempts at behavior change, R - Reflected patient's change talk and S - Summarized patient's change talk statements        Care Plan review completed: No    Medication Review:  No changes to current psychiatric medication(s)    Medication Compliance:  No    Changes in Health Issues:  Please see medical note by PCP.    Chemical Use Review:   Substance Use: Chemical use reviewed, no active concerns identified      Tobacco Use: No current tobacco use.      Assessment: Current Emotional / Mental Status (status of significant symptoms):  Risk status  (Self / Other harm or suicidal ideation)  Patient denies a history of suicidal ideation, suicide attempts, self-injurious behavior, homicidal ideation, homicidal behavior and and other safety concerns  Patient denies current fears or concerns for personal safety.  Patient denies current or recent suicidal ideation or behaviors.  Patient denies current or recent homicidal ideation or behaviors.  Patient denies current or recent self injurious behavior or ideation.  Patient denies other safety concerns.  A safety and risk management plan has not been developed at this time, however patient was encouraged to call [place] [phone] should there be a change in any of these risk factors.    Appearance:   Unable to assess - phone visit  Eye Contact:   Unable to assess  - phone visit  Psychomotor Behavior: Unable to assess  - phone visit  Attitude:   Cooperative   Orientation:   All  Speech   Rate / Production: Normal    Volume:  Normal   Mood:    Normal  Affect:    Unable to assess  - phone visit  Thought Content:  Clear   Thought Form:  Coherent  Logical   Insight:    Fair     Provisional Diagnoses:  1. Adjustment disorder with mixed anxiety and depressed mood      Collateral Reports Completed:  Not Applicable    Plan: (Homework, other):  Patient was given information about behavioral services and encouraged to schedule a follow up appointment with the clinic [facility] as needed.  She was also given information about mental health symptoms and treatment options .  CD Recommendations: No indications of CD issues.  Juan Luis [Tye] Gail MILLS Clinical Intern     Supervised/Reviewed by: GENE Valerio, [facility]

## 2018-01-11 ENCOUNTER — APPOINTMENT (OUTPATIENT)
Dept: GENERAL RADIOLOGY | Facility: CLINIC | Age: 73
DRG: 698 | End: 2018-01-11
Attending: EMERGENCY MEDICINE
Payer: MEDICARE

## 2018-01-11 ENCOUNTER — HOSPITAL ENCOUNTER (INPATIENT)
Facility: CLINIC | Age: 73
LOS: 4 days | Discharge: HOME HEALTH | DRG: 698 | End: 2018-01-15
Attending: EMERGENCY MEDICINE | Admitting: HOSPITALIST
Payer: MEDICARE

## 2018-01-11 ENCOUNTER — TELEPHONE (OUTPATIENT)
Dept: FAMILY MEDICINE | Facility: CLINIC | Age: 73
End: 2018-01-11

## 2018-01-11 ENCOUNTER — APPOINTMENT (OUTPATIENT)
Dept: CT IMAGING | Facility: CLINIC | Age: 73
DRG: 698 | End: 2018-01-11
Attending: EMERGENCY MEDICINE
Payer: MEDICARE

## 2018-01-11 DIAGNOSIS — N39.0 URINARY TRACT INFECTION ASSOCIATED WITH INDWELLING URETHRAL CATHETER, INITIAL ENCOUNTER (H): ICD-10-CM

## 2018-01-11 DIAGNOSIS — Z79.60 LONG-TERM USE OF IMMUNOSUPPRESSANT MEDICATION: ICD-10-CM

## 2018-01-11 DIAGNOSIS — Z94.0 KIDNEY TRANSPLANT RECIPIENT: ICD-10-CM

## 2018-01-11 DIAGNOSIS — T83.511A URINARY TRACT INFECTION ASSOCIATED WITH INDWELLING URETHRAL CATHETER, INITIAL ENCOUNTER (H): ICD-10-CM

## 2018-01-11 DIAGNOSIS — G93.40 ENCEPHALOPATHY: ICD-10-CM

## 2018-01-11 DIAGNOSIS — Z09 HOSPITAL DISCHARGE FOLLOW-UP: ICD-10-CM

## 2018-01-11 LAB
ALBUMIN SERPL-MCNC: 3.1 G/DL (ref 3.4–5)
ALBUMIN UR-MCNC: >=300 MG/DL
ALP SERPL-CCNC: 86 U/L (ref 40–150)
ALT SERPL W P-5'-P-CCNC: 16 U/L (ref 0–50)
ANION GAP SERPL CALCULATED.3IONS-SCNC: 6 MMOL/L (ref 3–14)
APPEARANCE UR: ABNORMAL
AST SERPL W P-5'-P-CCNC: 24 U/L (ref 0–45)
BACTERIA #/AREA URNS HPF: ABNORMAL /HPF
BASE EXCESS BLDV CALC-SCNC: 0.4 MMOL/L
BASOPHILS # BLD AUTO: 0 10E9/L (ref 0–0.2)
BASOPHILS NFR BLD AUTO: 0.3 %
BILIRUB SERPL-MCNC: 0.3 MG/DL (ref 0.2–1.3)
BILIRUB UR QL STRIP: NEGATIVE
BUN SERPL-MCNC: 24 MG/DL (ref 7–30)
CALCIUM SERPL-MCNC: 8.5 MG/DL (ref 8.5–10.1)
CHLORIDE SERPL-SCNC: 95 MMOL/L (ref 94–109)
CO2 SERPL-SCNC: 27 MMOL/L (ref 20–32)
COLOR UR AUTO: YELLOW
CREAT SERPL-MCNC: 1.2 MG/DL (ref 0.52–1.04)
DIFFERENTIAL METHOD BLD: NORMAL
EOSINOPHIL # BLD AUTO: 0.1 10E9/L (ref 0–0.7)
EOSINOPHIL NFR BLD AUTO: 1.3 %
ERYTHROCYTE [DISTWIDTH] IN BLOOD BY AUTOMATED COUNT: 13.8 % (ref 10–15)
FLUAV+FLUBV AG SPEC QL: NEGATIVE
FLUAV+FLUBV AG SPEC QL: NEGATIVE
GFR SERPL CREATININE-BSD FRML MDRD: 44 ML/MIN/1.7M2
GLUCOSE BLDC GLUCOMTR-MCNC: 101 MG/DL (ref 70–99)
GLUCOSE BLDC GLUCOMTR-MCNC: 117 MG/DL (ref 70–99)
GLUCOSE BLDC GLUCOMTR-MCNC: 119 MG/DL (ref 70–99)
GLUCOSE BLDC GLUCOMTR-MCNC: 130 MG/DL (ref 70–99)
GLUCOSE BLDC GLUCOMTR-MCNC: 133 MG/DL (ref 70–99)
GLUCOSE BLDC GLUCOMTR-MCNC: 152 MG/DL (ref 70–99)
GLUCOSE BLDC GLUCOMTR-MCNC: 163 MG/DL (ref 70–99)
GLUCOSE BLDC GLUCOMTR-MCNC: 66 MG/DL (ref 70–99)
GLUCOSE SERPL-MCNC: 119 MG/DL (ref 70–99)
GLUCOSE UR STRIP-MCNC: 100 MG/DL
HCO3 BLDV-SCNC: 28 MMOL/L (ref 21–28)
HCT VFR BLD AUTO: 35.5 % (ref 35–47)
HGB BLD-MCNC: 11.9 G/DL (ref 11.7–15.7)
HGB UR QL STRIP: ABNORMAL
IMM GRANULOCYTES # BLD: 0 10E9/L (ref 0–0.4)
IMM GRANULOCYTES NFR BLD: 0.3 %
KETONES UR STRIP-MCNC: NEGATIVE MG/DL
LACTATE SERPL-SCNC: 0.7 MMOL/L (ref 0.4–2)
LEUKOCYTE ESTERASE UR QL STRIP: ABNORMAL
LYMPHOCYTES # BLD AUTO: 1.4 10E9/L (ref 0.8–5.3)
LYMPHOCYTES NFR BLD AUTO: 23.6 %
MCH RBC QN AUTO: 30.4 PG (ref 26.5–33)
MCHC RBC AUTO-ENTMCNC: 33.5 G/DL (ref 31.5–36.5)
MCV RBC AUTO: 91 FL (ref 78–100)
MONOCYTES # BLD AUTO: 0.3 10E9/L (ref 0–1.3)
MONOCYTES NFR BLD AUTO: 5.4 %
NEUTROPHILS # BLD AUTO: 4.2 10E9/L (ref 1.6–8.3)
NEUTROPHILS NFR BLD AUTO: 69.1 %
NITRATE UR QL: POSITIVE
NRBC # BLD AUTO: 0 10*3/UL
NRBC BLD AUTO-RTO: 0 /100
OXYHGB MFR BLDV: 60 %
PCO2 BLDV: 56 MM HG (ref 40–50)
PH BLDV: 7.3 PH (ref 7.32–7.43)
PH UR STRIP: 6 PH (ref 5–7)
PLATELET # BLD AUTO: 166 10E9/L (ref 150–450)
PO2 BLDV: 35 MM HG (ref 25–47)
POTASSIUM SERPL-SCNC: 4.9 MMOL/L (ref 3.4–5.3)
PROT SERPL-MCNC: 8.2 G/DL (ref 6.8–8.8)
RBC # BLD AUTO: 3.92 10E12/L (ref 3.8–5.2)
RBC #/AREA URNS AUTO: ABNORMAL /HPF
SODIUM SERPL-SCNC: 128 MMOL/L (ref 133–144)
SOURCE: ABNORMAL
SP GR UR STRIP: 1.02 (ref 1–1.03)
SPECIMEN SOURCE: NORMAL
TACROLIMUS BLD-MCNC: 9.9 UG/L (ref 5–15)
TME LAST DOSE: NORMAL H
UROBILINOGEN UR STRIP-ACNC: 0.2 EU/DL (ref 0.2–1)
WBC # BLD AUTO: 6.1 10E9/L (ref 4–11)
WBC #/AREA URNS AUTO: >100 /HPF
WBC CLUMPS #/AREA URNS HPF: PRESENT /HPF

## 2018-01-11 PROCEDURE — 70450 CT HEAD/BRAIN W/O DYE: CPT

## 2018-01-11 PROCEDURE — 99285 EMERGENCY DEPT VISIT HI MDM: CPT | Mod: 25

## 2018-01-11 PROCEDURE — 80183 DRUG SCRN QUANT OXCARBAZEPIN: CPT | Performed by: HOSPITALIST

## 2018-01-11 PROCEDURE — 96365 THER/PROPH/DIAG IV INF INIT: CPT

## 2018-01-11 PROCEDURE — 87088 URINE BACTERIA CULTURE: CPT | Performed by: EMERGENCY MEDICINE

## 2018-01-11 PROCEDURE — 25000128 H RX IP 250 OP 636: Performed by: EMERGENCY MEDICINE

## 2018-01-11 PROCEDURE — 87186 SC STD MICRODIL/AGAR DIL: CPT | Performed by: EMERGENCY MEDICINE

## 2018-01-11 PROCEDURE — 87086 URINE CULTURE/COLONY COUNT: CPT | Performed by: EMERGENCY MEDICINE

## 2018-01-11 PROCEDURE — 99223 1ST HOSP IP/OBS HIGH 75: CPT | Mod: AI | Performed by: HOSPITALIST

## 2018-01-11 PROCEDURE — 80053 COMPREHEN METABOLIC PANEL: CPT | Performed by: EMERGENCY MEDICINE

## 2018-01-11 PROCEDURE — 83605 ASSAY OF LACTIC ACID: CPT | Performed by: EMERGENCY MEDICINE

## 2018-01-11 PROCEDURE — 96375 TX/PRO/DX INJ NEW DRUG ADDON: CPT

## 2018-01-11 PROCEDURE — 82805 BLOOD GASES W/O2 SATURATION: CPT | Performed by: NURSE PRACTITIONER

## 2018-01-11 PROCEDURE — 25800025 ZZH RX 258

## 2018-01-11 PROCEDURE — 85025 COMPLETE CBC W/AUTO DIFF WBC: CPT | Performed by: EMERGENCY MEDICINE

## 2018-01-11 PROCEDURE — 81001 URINALYSIS AUTO W/SCOPE: CPT | Performed by: EMERGENCY MEDICINE

## 2018-01-11 PROCEDURE — 36415 COLL VENOUS BLD VENIPUNCTURE: CPT | Performed by: HOSPITALIST

## 2018-01-11 PROCEDURE — 87040 BLOOD CULTURE FOR BACTERIA: CPT | Performed by: EMERGENCY MEDICINE

## 2018-01-11 PROCEDURE — 87804 INFLUENZA ASSAY W/OPTIC: CPT | Performed by: EMERGENCY MEDICINE

## 2018-01-11 PROCEDURE — 71046 X-RAY EXAM CHEST 2 VIEWS: CPT

## 2018-01-11 PROCEDURE — 80197 ASSAY OF TACROLIMUS: CPT | Performed by: INTERNAL MEDICINE

## 2018-01-11 PROCEDURE — 36415 COLL VENOUS BLD VENIPUNCTURE: CPT | Performed by: INTERNAL MEDICINE

## 2018-01-11 PROCEDURE — 80048 BASIC METABOLIC PNL TOTAL CA: CPT | Performed by: INTERNAL MEDICINE

## 2018-01-11 PROCEDURE — 00000146 ZZHCL STATISTIC GLUCOSE BY METER IP

## 2018-01-11 PROCEDURE — 12000007 ZZH R&B INTERMEDIATE

## 2018-01-11 PROCEDURE — 51702 INSERT TEMP BLADDER CATH: CPT

## 2018-01-11 PROCEDURE — 25000128 H RX IP 250 OP 636: Performed by: HOSPITALIST

## 2018-01-11 PROCEDURE — 96361 HYDRATE IV INFUSION ADD-ON: CPT

## 2018-01-11 PROCEDURE — 25800025 ZZH RX 258: Performed by: EMERGENCY MEDICINE

## 2018-01-11 RX ORDER — ONDANSETRON 4 MG/1
4 TABLET, ORALLY DISINTEGRATING ORAL EVERY 6 HOURS PRN
Status: DISCONTINUED | OUTPATIENT
Start: 2018-01-11 | End: 2018-01-15 | Stop reason: HOSPADM

## 2018-01-11 RX ORDER — NALOXONE HYDROCHLORIDE 0.4 MG/ML
.1-.4 INJECTION, SOLUTION INTRAMUSCULAR; INTRAVENOUS; SUBCUTANEOUS
Status: DISCONTINUED | OUTPATIENT
Start: 2018-01-11 | End: 2018-01-13

## 2018-01-11 RX ORDER — LACTOBACILLUS RHAMNOSUS GG 10B CELL
1 CAPSULE ORAL 2 TIMES DAILY
Status: DISCONTINUED | OUTPATIENT
Start: 2018-01-11 | End: 2018-01-13

## 2018-01-11 RX ORDER — PREDNISONE 5 MG/1
5 TABLET ORAL DAILY
Status: DISCONTINUED | OUTPATIENT
Start: 2018-01-12 | End: 2018-01-15 | Stop reason: HOSPADM

## 2018-01-11 RX ORDER — AMOXICILLIN 250 MG
1 CAPSULE ORAL 2 TIMES DAILY PRN
Status: DISCONTINUED | OUTPATIENT
Start: 2018-01-11 | End: 2018-01-12

## 2018-01-11 RX ORDER — CLONIDINE HYDROCHLORIDE 0.1 MG/1
0.1 TABLET ORAL EVERY 4 HOURS PRN
Status: DISCONTINUED | OUTPATIENT
Start: 2018-01-11 | End: 2018-01-15 | Stop reason: HOSPADM

## 2018-01-11 RX ORDER — ASPIRIN 81 MG/1
81 TABLET, CHEWABLE ORAL DAILY
Status: DISCONTINUED | OUTPATIENT
Start: 2018-01-12 | End: 2018-01-15 | Stop reason: HOSPADM

## 2018-01-11 RX ORDER — BISACODYL 10 MG
10 SUPPOSITORY, RECTAL RECTAL DAILY PRN
Status: DISCONTINUED | OUTPATIENT
Start: 2018-01-11 | End: 2018-01-15 | Stop reason: HOSPADM

## 2018-01-11 RX ORDER — FAMOTIDINE 20 MG/1
20 TABLET, FILM COATED ORAL 2 TIMES DAILY
Status: DISCONTINUED | OUTPATIENT
Start: 2018-01-11 | End: 2018-01-12

## 2018-01-11 RX ORDER — LIDOCAINE 40 MG/G
CREAM TOPICAL
Status: DISCONTINUED | OUTPATIENT
Start: 2018-01-11 | End: 2018-01-15 | Stop reason: HOSPADM

## 2018-01-11 RX ORDER — POLYETHYLENE GLYCOL 3350 17 G/17G
17 POWDER, FOR SOLUTION ORAL DAILY PRN
Status: DISCONTINUED | OUTPATIENT
Start: 2018-01-11 | End: 2018-01-15 | Stop reason: HOSPADM

## 2018-01-11 RX ORDER — ACETAMINOPHEN 650 MG/1
650 SUPPOSITORY RECTAL EVERY 4 HOURS PRN
Status: DISCONTINUED | OUTPATIENT
Start: 2018-01-11 | End: 2018-01-15 | Stop reason: HOSPADM

## 2018-01-11 RX ORDER — LABETALOL HYDROCHLORIDE 5 MG/ML
10 INJECTION, SOLUTION INTRAVENOUS
Status: DISCONTINUED | OUTPATIENT
Start: 2018-01-11 | End: 2018-01-14

## 2018-01-11 RX ORDER — AMOXICILLIN 250 MG
2 CAPSULE ORAL 2 TIMES DAILY PRN
Status: DISCONTINUED | OUTPATIENT
Start: 2018-01-11 | End: 2018-01-12

## 2018-01-11 RX ORDER — DEXTROSE MONOHYDRATE, SODIUM CHLORIDE, AND POTASSIUM CHLORIDE 50; 1.49; 9 G/1000ML; G/1000ML; G/1000ML
INJECTION, SOLUTION INTRAVENOUS CONTINUOUS
Status: DISCONTINUED | OUTPATIENT
Start: 2018-01-11 | End: 2018-01-12

## 2018-01-11 RX ORDER — TACROLIMUS 1 MG/1
2 CAPSULE ORAL 2 TIMES DAILY
Status: DISCONTINUED | OUTPATIENT
Start: 2018-01-12 | End: 2018-01-15 | Stop reason: HOSPADM

## 2018-01-11 RX ORDER — DEXTROSE MONOHYDRATE 25 G/50ML
INJECTION, SOLUTION INTRAVENOUS
Status: COMPLETED
Start: 2018-01-11 | End: 2018-01-11

## 2018-01-11 RX ORDER — LEVOTHYROXINE SODIUM 50 UG/1
50 TABLET ORAL DAILY
Status: DISCONTINUED | OUTPATIENT
Start: 2018-01-12 | End: 2018-01-15 | Stop reason: HOSPADM

## 2018-01-11 RX ORDER — CLONIDINE HYDROCHLORIDE 0.1 MG/1
0.1 TABLET ORAL 2 TIMES DAILY
Status: DISCONTINUED | OUTPATIENT
Start: 2018-01-11 | End: 2018-01-15 | Stop reason: HOSPADM

## 2018-01-11 RX ORDER — CEFEPIME HYDROCHLORIDE 2 G/1
2 INJECTION, POWDER, FOR SOLUTION INTRAVENOUS ONCE
Status: DISCONTINUED | OUTPATIENT
Start: 2018-01-11 | End: 2018-01-11

## 2018-01-11 RX ORDER — DEXTROSE MONOHYDRATE 25 G/50ML
25-50 INJECTION, SOLUTION INTRAVENOUS
Status: DISCONTINUED | OUTPATIENT
Start: 2018-01-11 | End: 2018-01-15 | Stop reason: HOSPADM

## 2018-01-11 RX ORDER — MULTIVIT-MIN/IRON/FOLIC ACID/K 18-600-40
4000 CAPSULE ORAL 2 TIMES DAILY
COMMUNITY
End: 2019-08-06

## 2018-01-11 RX ORDER — DEXTROSE MONOHYDRATE 25 G/50ML
50 INJECTION, SOLUTION INTRAVENOUS ONCE
Status: COMPLETED | OUTPATIENT
Start: 2018-01-11 | End: 2018-01-11

## 2018-01-11 RX ORDER — NALOXONE HYDROCHLORIDE 0.4 MG/ML
0.4 INJECTION, SOLUTION INTRAMUSCULAR; INTRAVENOUS; SUBCUTANEOUS ONCE
Status: COMPLETED | OUTPATIENT
Start: 2018-01-11 | End: 2018-01-11

## 2018-01-11 RX ORDER — ACETAMINOPHEN 325 MG/1
650 TABLET ORAL EVERY 4 HOURS PRN
Status: DISCONTINUED | OUTPATIENT
Start: 2018-01-11 | End: 2018-01-15 | Stop reason: HOSPADM

## 2018-01-11 RX ORDER — MEROPENEM 500 MG/1
500 INJECTION, POWDER, FOR SOLUTION INTRAVENOUS ONCE
Status: COMPLETED | OUTPATIENT
Start: 2018-01-11 | End: 2018-01-11

## 2018-01-11 RX ORDER — NICOTINE POLACRILEX 4 MG
15-30 LOZENGE BUCCAL
Status: DISCONTINUED | OUTPATIENT
Start: 2018-01-11 | End: 2018-01-15 | Stop reason: HOSPADM

## 2018-01-11 RX ORDER — MEROPENEM 500 MG/1
500 INJECTION, POWDER, FOR SOLUTION INTRAVENOUS EVERY 6 HOURS
Status: DISCONTINUED | OUTPATIENT
Start: 2018-01-11 | End: 2018-01-13

## 2018-01-11 RX ORDER — ONDANSETRON 2 MG/ML
4 INJECTION INTRAMUSCULAR; INTRAVENOUS EVERY 6 HOURS PRN
Status: DISCONTINUED | OUTPATIENT
Start: 2018-01-11 | End: 2018-01-15 | Stop reason: HOSPADM

## 2018-01-11 RX ORDER — PRAVASTATIN SODIUM 10 MG
10 TABLET ORAL DAILY
Status: DISCONTINUED | OUTPATIENT
Start: 2018-01-12 | End: 2018-01-15 | Stop reason: HOSPADM

## 2018-01-11 RX ADMIN — NALOXONE HYDROCHLORIDE 0.4 MG: 0.4 INJECTION, SOLUTION INTRAMUSCULAR; INTRAVENOUS; SUBCUTANEOUS at 15:35

## 2018-01-11 RX ADMIN — SODIUM CHLORIDE 1000 ML: 9 INJECTION, SOLUTION INTRAVENOUS at 15:20

## 2018-01-11 RX ADMIN — POTASSIUM CHLORIDE, DEXTROSE MONOHYDRATE AND SODIUM CHLORIDE: 150; 5; 900 INJECTION, SOLUTION INTRAVENOUS at 18:50

## 2018-01-11 RX ADMIN — DEXTROSE MONOHYDRATE 50 ML: 500 INJECTION PARENTERAL at 17:49

## 2018-01-11 RX ADMIN — ENOXAPARIN SODIUM 40 MG: 40 INJECTION SUBCUTANEOUS at 23:57

## 2018-01-11 RX ADMIN — POTASSIUM CHLORIDE, DEXTROSE MONOHYDRATE AND SODIUM CHLORIDE: 150; 5; 900 INJECTION, SOLUTION INTRAVENOUS at 21:50

## 2018-01-11 RX ADMIN — DEXTROSE AND SODIUM CHLORIDE: 5; 900 INJECTION, SOLUTION INTRAVENOUS at 22:43

## 2018-01-11 RX ADMIN — MEROPENEM 500 MG: 500 INJECTION, POWDER, FOR SOLUTION INTRAVENOUS at 16:57

## 2018-01-11 RX ADMIN — DEXTROSE MONOHYDRATE 50 ML: 25 INJECTION, SOLUTION INTRAVENOUS at 17:49

## 2018-01-11 NOTE — PHARMACY-ADMISSION MEDICATION HISTORY
Admission medication history interview status for the 1/11/2018  admission is complete. See EPIC admission navigator for prior to admission medications     Medication history source reliability:Good    Actions taken by pharmacist (provider contacted, etc): list came with patient     Additional medication history information not noted on PTA med list : unknown when she had last doses    Medication reconciliation/reorder completed by provider prior to medication history? No    Time spent in this activity: 20 min    Prior to Admission medications    Medication Sig Last Dose Taking? Auth Provider   VITAMIN D, CHOLECALCIFEROL, PO Take 4,000 Units by mouth 2 times daily  Yes Unknown, Entered By History   tacrolimus (GENERIC EQUIVALENT) 1 MG capsule Take 2 capsules (2 mg) by mouth 2 times daily Total dose 2.5 mg twice daily  Yes Marivel Barcensa APRN CNP   baclofen (LIORESAL) 10 MG tablet Take 1 tablet (10 mg) by mouth 3 times daily as needed for muscle spasms  Yes Marivel Barcenas APRN CNP   gabapentin (NEURONTIN) 300 MG capsule Take 3 capsules (900 mg) by mouth 2 times daily For pain  Yes Marivel Barcenas APRN CNP   lidocaine (XYLOCAINE) 2 % topical gel Apply topically as needed for moderate pain 10 ML every 3 weeks  Yes Marivel Barcenas APRN CNP   guaiFENesin (ROBITUSSIN) 100 MG/5ML SYRP Take 10-20 mLs by mouth every 4 hours as needed for cough  Yes Marivel Barcenas APRN CNP   levothyroxine (SYNTHROID/LEVOTHROID) 50 MCG tablet TAKE ONE TABLET BY MOUTH EVERY DAY  Yes Marivel Barcenas APRN CNP   diphenoxylate-atropine (LOMOTIL) 2.5-0.025 MG per tablet Take 1 tablet by mouth 4 times daily as needed for diarrhea  Yes Brittani Ortiz MD   meclizine (ANTIVERT) 12.5 MG tablet Take 1 tablet (12.5 mg) by mouth 3 times daily as needed for dizziness  Yes Brittani Ortiz MD   cloNIDine (CATAPRES) 0.1 MG tablet TAKE ONE TABLET BY MOUTH TWICE A DAY  Yes  Marivel Barcenas APRN CNP   predniSONE (DELTASONE) 5 MG tablet Take 1 tablet (5 mg) by mouth daily  Yes Marivel Barcenas APRN CNP   insulin glargine (LANTUS SOLOSTAR) 100 UNIT/ML injection Inject 24 units under the skin daily.  Patient taking differently: Inject 24 Units Subcutaneous At Bedtime Inject 24 units under the skin daily.  Yes Marivel Barcenas APRN CNP   insulin aspart (NOVOLOG FLEXPEN) 100 UNIT/ML injection Inject 10 units under the skin 2 times daily before meals as directed. Add sliding scale as needed. Avg use 25 units per day  Patient taking differently: Inject 10 units under the skin 2 times daily before meals as directed. Add sliding scale as needed.  2 units for every 50 over 201.  Avg use 25 units per day  Yes Marivel Barcenas APRN CNP   labetalol (NORMODYNE) 300 MG tablet Take 150 mg by mouth 2 times daily Take 1/2 tablet (150mg) =300mg. Hold for systolic BP less than 120.  Yes    loratadine (CLARITIN) 10 MG tablet Take 1 tablet (10 mg) by mouth daily  Yes Marivel Barcenas APRN CNP   pravastatin (PRAVACHOL) 10 MG tablet TAKE ONE TABLET BY MOUTH EVERY DAY  Yes Marivel Barcenas APRN CNP   OXcarbazepine (TRILEPTAL) 300 MG tablet Take 1 tablet (300 mg) by mouth 2 times daily For pain  Yes    aspirin 81 MG chewable tablet Take 1 tablet (81 mg) by mouth daily Starting 1 week after your kidney biopsy  Yes Christiano Webber MD   cyanocobalamin (VITAMIN B12) 1000 MCG/ML injection Inject 1 mL (1,000 mcg) into the muscle every 30 days  Yes Marivel Barcenas APRN CNP   TraMADol HCl 50 MG TBDP Take 50 mg by mouth 3 times daily as needed  Yes Marivel Barcenas APRN CNP   ondansetron (ZOFRAN ODT) 4 MG disintegrating tablet Take 1-2 tablets (4-8 mg) by mouth every 8 hours as needed for nausea  Yes Marivel Barcenas APRN CNP   simethicone (MYLICON) 125 MG CHEW Take 1 tablet (125 mg) by mouth as needed for intestinal  gas  Yes    Lactobacillus Rhamnosus, GG, (CULTURELL) capsule Take 1 capsule by mouth 2 times daily  Yes She Siddiqui MD   Cranberry 400 MG TABS Take 400 mg by mouth 2 times daily  Yes Unknown, Entered By History   famotidine (PEPCID) 20 MG tablet Take 1 tablet (20 mg) by mouth 2 times daily  Yes Marivel Barcenas APRN CNP   ACETAMINOPHEN PO Take 325-650 mg by mouth every 4 hours as needed.  Yes Unknown, Entered By History   order for DME High back guerrero wheel chair  MICHELLE 99  DX: morbid obesity, generalized weakness, physical deconditioning, chronic vertigo   Marivel Barcenas APRN CNP   blood glucose monitoring (ACCU-CHEK COMPACT PLUS) test strip Use to test blood sugar 3 times daily or as directed.  Ok to substitute alternative if insurance prefers.      order for DME Equipment being ordered: Silver Impregnated catheters  HX of frequent UTI   Marivel Barcenas APRN CNP   order for DME Equipment being ordered: Ahuja FX CPAP interface (nasal pillows), size XS.   Lorenzo Choudhury MD   insulin pen needle 30G X 8 MM Use as directed with LanMarivel Underwood APRN CNP   order for DME Equipment being ordered: Blue Chux  Please send to [facility]   Marivel Barcenas APRN CNP   ORDER FOR DME Equipment being ordered: Johnson Catheters - 18 french   Marivel Barcenas APRN CNP

## 2018-01-11 NOTE — ED NOTES
[facility]  ED Nurse Handoff Report    ED Chief complaint: Altered Mental Status (lives @ home with home health.  EMS called since patient only responding to painful stimlui, O2 88% for family, normal for medics. BS 28 given D50)      ED Diagnosis:   Final diagnoses:   Urinary tract infection associated with indwelling urethral catheter, initial encounter (H)   Encephalopathy       Code Status: Full Code    Allergies:   Allergies   Allergen Reactions     Bactrim Ds [Sulfamethoxazole W/Trimethoprim]      Creatinine level increased     Atorvastatin Calcium      myalgias, muscle aches     Codeine Nausea and Vomiting     Darvocet [Propoxyphene N-Apap] Nausea and Vomiting     Hydromorphone      Levofloxacin Other (See Comments) and Diarrhea     hallucinations     Morphine      Nausea and vomiting     Niaspan [Niacin] GI Disturbance     Flushing even at low dose, GI upset     Percocet [Oxycodone-Acetaminophen]      Vomiting after taking med couple of times     Vicodin [Hydrocodone-Acetaminophen] Nausea and Vomiting       Activity level - Baseline/Home:  Total Care    Activity Level - Current:   Total Care     Needed?: No    Isolation: Yes  Infection: ESBL, VRE    Bariatric?: Yes    Vital Signs:   Vitals:    01/11/18 1538 01/11/18 1607 01/11/18 1622 01/11/18 1638   BP:  (!) 170/95  143/74   Resp:   12 12   Temp:       SpO2: 99%  98% 94%   Weight:       Height:           Cardiac Rhythm: ,   Cardiac  Cardiac Rhythm: Normal sinus rhythm    Pain level:      Is this patient confused?: Yes    Patient Report: Initial Complaint: Gem Jade is a 72 year old female who presents with altered mental status. Pt was seen by home care today who noticed the pt had increased altered mental status. Pt was only responding to painful stimuli. Pt has a long hx of UTI.   Focused Assessment: Cardiac: SR, unable to assess   Resp: Equal chest rise, on 3lpm O2, unlabored respirations  Neuro: Wakes to painful  stimuli  Tests Performed: Labs, CT, CXR  Abnormal Results:   Comprehensive metabolic panel Resulted Abnormal Result -   Sodium: 128 mmol/L [Ref Range: 133 - 144]  Potassium: 4.9 mmol/L [Ref Range: 3.4 - 5.3] (Specimen slightly hemolyzed, potassium may be falsely elevated)  Chloride: 95 mmol/L [Ref Range: 94 - 109]  Carbon Dioxide: 27 mmol/L [Ref Range: 20 - 32]  Anion Gap: 6 mmol/L [Ref Range: 3 - 14]  Glucose: 119 mg/dL [Ref Range: 70 - 99]  Urea Nitrogen: 24 mg/dL [Ref Range: 7 - 30]  Creatinine: 1.20 mg/dL [Ref Range: 0.52 - 1.04]  GFR Estimate: 44 mL/min/1.7m2 [Ref Range: >60] (Non  GFR Calc)  GFR Estimate If Black: 54 mL/min/1.7m2 [Ref Range: >60] ( GFR Calc)  Calcium: 8.5 mg/dL [Ref Range: 8.5 - 10.1]  Bilirubin Total: 0.3 mg/dL [Ref Range: 0.2 - 1.3]  Albumin: 3.1 g/dL [Ref Range: 3.4 - 5.0]  Protein Total: 8.2 g/dL [Ref Range: 6.8 - 8.8]  Alkaline Phosphatase: 86 U/L [Ref Range: 40 - 150]  ALT: 16 U/L [Ref Range: 0 - 50]  AST: 24 U/L [Ref Range: 0 - 45] (Specimen is hemolyzed which can falsely elevate AST. Analysis of a   non-hemolyzed specimen may result in a lower value.   )  Collected: 1/11/2018 15:02  Last updated: 1/11/2018 15:47     Urine Microscopic Resulted Abnormal Result -   WBC Urine: >100 /HPF [Ref Range: OTO2^O - 2]  RBC Urine: 5-10 /HPF [Ref Range: OTO2^O - 2]  WBC Clumps: Present /HPF [Ref Range: NEG^Negative]  Bacteria Urine: Many /HPF [Ref Range: NEG^Negative]  Collected: 1/11/2018 16:14  Last updated: 1/11/2018 16:37     *UA reflex to Microscopic Resulted Abnormal Result -   Color Urine: Yellow  Appearance Urine: Turbid  Glucose Urine: 100 mg/dL [Ref Range: NEG^Negative]  Bilirubin Urine: Negative [Ref Range: NEG^Negative]  Ketones Urine: Negative mg/dL [Ref Range: NEG^Negative]  Specific Gravity Urine: 1.025 [Ref Range: 1.003 - 1.035]  Blood Urine: Large [Ref Range: NEG^Negative]  pH Urine: 6.0 pH [Ref Range: 5.0 - 7.0]  Protein Albumin Urine: >=300 mg/dL  [Ref Range: NEG^Negative]  Urobilinogen Urine: 0.2 EU/dL [Ref Range: 0.2 - 1.0]  Nitrite Urine: Positive [Ref Range: NEG^Negative]  Leukocyte Esterase Urine: Large [Ref Range: NEG^Negative]  Source: Catheterized Urine  Collected: 1/11/2018 16:14  Last updated: 1/11/2018 16:37     Treatments provided: NS 1000ml bolus, Merrem 500mg/10ml, Narcan 0.4mg, dextrose 50% 50ml, D5 and 0.9 NaCl with KCl 20meq 75ml/hr    Family Comments:  contacted on phone    OBS brochure/video discussed/provided to patient: No    ED Medications:   Medications   naloxone (NARCAN) injection 0.4 mg (0.4 mg Intravenous Given 1/11/18 1535)   0.9% sodium chloride BOLUS (1,000 mLs Intravenous New Bag 1/11/18 1520)   meropenem (MERREM) 500 mg in 10 mL SWFI for IVP Vial (500 mg Intravenous Given 1/11/18 1657)       Drips infusing?:  Yes      ED NURSE PHONE NUMBER: *[phone]

## 2018-01-11 NOTE — ED NOTES
Keshawn called asking and informed of results.  unhappy with size of catheter that was placed. MD informed.

## 2018-01-11 NOTE — H&P
[facility]    History and Physical  Hospitalist       Date of Admission:  1/11/2018  Date of Service (when I saw the patient): 01/11/18    Assessment & Plan   Gem Jade is a 72 year old female who presents with ams. Admitted for further evaluation and treatment.     Acute metabolic encephalopathy, suspected secondary to hypoglycemia and acute on chronic urinary tract infection, with multiple drug-resistant organisms in the past: Patient was recently discharged on December 25, 2017 due to sepsis and urinary tract infection at that time.  The patient was on vancomycin and meropenem during that admission initially, and then subsequently consolidated to meropenem during her inpatient stay.  She has neurogenic bladder, requiring a chronic indwelling Johnson catheter and a history of Klebsiella, ESBL, VRE, Citrobacter freundii, and candiduria.  Infectious disease has contemplated her clinical scenario and decided against chronic suppressive antibiotic therapy, reportedly, due to the risk of acquisition of Clostridium difficile infection.  Of note, chart review reveals EMS finding low blood sugar upon initial evaluation of the patient.  On admission the patient did have her Johnson catheter exchanged according to the emergency department.  Urinalysis revealed 100 of glucose, over 300 protein, positive nitrite, large blood, large leukocyte Estrace, greater than 100 white blood cells, 5-10 red blood cells, many bacteria, and white blood cell clumps present, from a catheterized urine specimen.  Influenza testing is negative for influenza A or B.  A CT of the head was completed showing no acute intracranial hemorrhage, midline shift, or acute infarct, stable-appearing atrophy changes, small fluid at the bilateral maxillary sinuses, along with ethmoid sinus mucosal thickening, per report.  Again, EMS reported that the patient had a blood glucose of 28, and was given D50 in route to the emergency department.   Lactic acid returned at 0.7 on admission.  - Blood cultures pending ×2.  - If mentation is not clearing, consider EEG and neurology evaluation.  - Continue continue empiric therapy at this time.  - Infectious disease consult.  - Urine culture pending.  - Avoid medications which may alter her mentation.  - Frequent reorientation.  - Delirium protocol.  - Close neurologic monitoring.  - Hold prior to admission baclofen at this time.  - Hold prior to admission gabapentin at this time.  - Hold prior to admission Trileptal at this time.  - Hold prior to admission tramadol at this time.  - Oxcarbazepine level.    Hyponatremia: Patient with a sodium level of 128 on admission.  - Gentle fluids with D5 normal saline.  - Monitor sodium closely.    Acute hypoxic respiratory failure, secondary to altered mentation: Patient is not requiring oxygen at baseline according to note review.  The patient did have a chest x-ray on admission showing no acute infiltrates, and report of no acute disease.  - Pulmonary hygiene.  - Wean oxygen as able.  - Respiratory cares.    Chronic renal failure, stage 3-4, status post renal transplant in 1999, with chronic immunosuppression, as well as hyperparathyroidism.  Initial creatinine is 1.20 on admission.  The patient has been followed by nephrology in the past.  She is maintained on prednisone and tacrolimus.  Her last tacrolimus level was on January 2, 2018 and returned at 3.0.  - Continue tacrolimus and steroids.  - Nephrology consulted.    Cardiac, hypertension, hyperlipidemia: Stable.  - Monitor.  - Continue prior to admission aspirin 81 mg daily as able.  - Continue prior to admission clonidine as able.  - Antihypertensives p.r.n. as needed.  - Continue prior to admission labetalol as able.  - Continue prior to admission statin as able.    Diabetes mellitus type 2, with a history of gastroparesis, retinopathy, polyneuropathy: Patient with previous hemoglobin A1c is 6.7 on December 19, 2017.   Patient was noted to have a low blood sugar by EMS upon initial evaluation according to documentation in the chart review.  - Close blood sugar monitoring.  - Insulin sliding scale.  - D5NS IVF.   - Hold long-acting insulin at this time.    Anemia of chronic disease, baseline 10-12: Patient with no obvious sign of blood loss on admission.  - Monitor.  Hemoglobin on admission is normal range 11.9.    Physical deconditioning, functional impairment, secondary to physical debility, polyneuropathy, and history of Charcot foot: Patient with home cares, per report.  - Monitor.  - Continue therapy evaluations and treatment.    History of bilateral pulmonary emboli, postop after knee replacement in the past: This is elucidated upon chart review, listed in past medical history.  - Monitor.  Consider further evaluation as clinically indicated.    Hypothyroidism: Patient maintained levothyroxine prior to admission.  Last TSH was obtained on December 19, 2017 and returning 1.34.  - Continue prior to admission levothyroxine.    GERD: Stable.  Patient maintained on famotidine prior to admission.  - Continue prior to admission famotidine as able.    DVT Prophylaxis: Enoxaparin (Lovenox) SQ  Code: Full Code    Disposition: Inpatient, PT, OT, and speech therapy consultation.    Dr. Grant Haas D.O.  [facility]  Pager [phone]    Primary Care Physician   Marivel Barcenas NP    Chief Complaint   Altered mental status.    History is obtained from the patient and medical records.     History of Present Illness   Gem Jade is a 72 year old female who presents with ams. Admitted for further evaluation and treatment. Acute metabolic encephalopathy, suspected secondary to hypoglycemia and acute on chronic urinary tract infection, with multiple drug-resistant organisms in the past: Patient was recently discharged on December 25, 2017 due to sepsis and urinary tract infection at that time.  The  patient was on vancomycin and meropenem during that admission initially, and then subsequently consolidated to meropenem during her inpatient stay.  She has neurogenic bladder, requiring a chronic indwelling Johnson catheter and a history of Klebsiella, ESBL, VRE, Citrobacter freundii, and candiduria.  Infectious disease has contemplated her clinical scenario and decided against chronic suppressive antibiotic therapy, reportedly, due to the risk of acquisition of Clostridium difficile infection.  Of note, chart review reveals EMS finding low blood sugar upon initial evaluation of the patient.  On admission the patient did have her Johnson catheter exchanged according to the emergency department.  Urinalysis revealed 100 of glucose, over 300 protein, positive nitrite, large blood, large leukocyte Estrace, greater than 100 white blood cells, 5-10 red blood cells, many bacteria, and white blood cell clumps present, from a catheterized urine specimen.  Influenza testing is negative for influenza A or B.  A CT of the head was completed showing no acute intracranial hemorrhage, midline shift, or acute infarct, stable-appearing atrophy changes, small fluid at the bilateral maxillary sinuses, along with ethmoid sinus mucosal thickening, per report.  Again, EMS reported that the patient had a blood glucose of 28, and was given D50 in route to the emergency department.  The patient continues to have altered mentation, thus history of present illness, review of systems, and other history are incomplete.    Past Medical History    I have reviewed this patient's medical history and updated it with pertinent information if needed.   Past Medical History:   Diagnosis Date     Background diabetic retinopathy(362.01)     extensive diabetic retinopathy, s/p multiple laser treatments     Diabetic gastroparesis (H)     followed by [facility] (Dr. Chen)     Diarrhea      Dizziness and giddiness      Hyponatremia 12/16/11    Na 121     Kidney  replaced by transplant      Mixed hyperlipidemia      Obesity      Osteopenia 11/07    per DEXA     Other and unspecified hyperlipidemia      Other pulmonary embolism and infarction 3/03    Bilateral pulmonary emboli post op after knee replacement     Polyneuropathy in diabetes(357.2)      Primary localized osteoarthrosis, lower leg      Pyelonephritis, unspecified      Type 2 diabetes mellitus with complications (H)      Urinary tract infection, site not specified     Chronic UTIs     UTI (urinary tract infection) due to urinary indwelling catheter (H)        Past Surgical History   I have reviewed this patient's surgical history and updated it with pertinent information if needed.  Past Surgical History:   Procedure Laterality Date     C NONSPECIFIC PROCEDURE  10/99    kidney transplant     C NONSPECIFIC PROCEDURE  8/00    MRI head, lumbar spine, pelvis     C TOTAL KNEE ARTHROPLASTY  3/03    Knee Replacement, Total right, post op PE     CHOLECYSTECTOMY       HC LEFT HEART CATHETERIZATION  1999    Cardiac Cath, Left Heart, Negative  (@ [facility])     HC LEFT HEART CATHETERIZATION  4/05    normal coronary angiogram at [facility], had mild troponin rise (0.71)     HC REMV CATARACT EXTRACAP,INSERT LENS  8/04    bilateral cataract repaired, left eye        Prior to Admission Medications   Prior to Admission Medications   Prescriptions Last Dose Informant Patient Reported? Taking?   ACETAMINOPHEN PO  Other Yes Yes   Sig: Take 325-650 mg by mouth every 4 hours as needed.   Cranberry 400 MG TABS  Other Yes Yes   Sig: Take 400 mg by mouth 2 times daily   Lactobacillus Rhamnosus, GG, (CULTURELL) capsule  Other No Yes   Sig: Take 1 capsule by mouth 2 times daily   ORDER FOR DME  Other No No   Sig: Equipment being ordered: Johnson Catheters - 18 french   OXcarbazepine (TRILEPTAL) 300 MG tablet  Other Yes Yes   Sig: Take 1 tablet (300 mg) by mouth 2 times daily For pain   TraMADol HCl 50 MG TBDP  Other No Yes   Sig: Take 50 mg by mouth 3  times daily as needed   VITAMIN D, CHOLECALCIFEROL, PO  Other Yes Yes   Sig: Take 4,000 Units by mouth 2 times daily   aspirin 81 MG chewable tablet  Other No Yes   Sig: Take 1 tablet (81 mg) by mouth daily Starting 1 week after your kidney biopsy   baclofen (LIORESAL) 10 MG tablet  Other Yes Yes   Sig: Take 1 tablet (10 mg) by mouth 3 times daily as needed for muscle spasms   blood glucose monitoring (ACCU-CHEK COMPACT PLUS) test strip  Other Yes No   Sig: Use to test blood sugar 3 times daily or as directed.  Ok to substitute alternative if insurance prefers.   cloNIDine (CATAPRES) 0.1 MG tablet  Other No Yes   Sig: TAKE ONE TABLET BY MOUTH TWICE A DAY   cyanocobalamin (VITAMIN B12) 1000 MCG/ML injection  Other No Yes   Sig: Inject 1 mL (1,000 mcg) into the muscle every 30 days   diphenoxylate-atropine (LOMOTIL) 2.5-0.025 MG per tablet  Other No Yes   Sig: Take 1 tablet by mouth 4 times daily as needed for diarrhea   famotidine (PEPCID) 20 MG tablet  Other Yes Yes   Sig: Take 1 tablet (20 mg) by mouth 2 times daily   gabapentin (NEURONTIN) 300 MG capsule  Other No Yes   Sig: Take 3 capsules (900 mg) by mouth 2 times daily For pain   guaiFENesin (ROBITUSSIN) 100 MG/5ML SYRP  Other No Yes   Sig: Take 10-20 mLs by mouth every 4 hours as needed for cough   insulin aspart (NOVOLOG FLEXPEN) 100 UNIT/ML injection  Other No Yes   Sig: Inject 10 units under the skin 2 times daily before meals as directed. Add sliding scale as needed. Avg use 25 units per day   Patient taking differently: Inject 10 units under the skin 2 times daily before meals as directed. Add sliding scale as needed.  2 units for every 50 over 201.  Avg use 25 units per day   insulin glargine (LANTUS SOLOSTAR) 100 UNIT/ML injection  Other No Yes   Sig: Inject 24 units under the skin daily.   Patient taking differently: Inject 24 Units Subcutaneous At Bedtime Inject 24 units under the skin daily.   insulin pen needle 30G X 8 MM  Other No No   Sig: Use as  directed with Lantus   labetalol (NORMODYNE) 300 MG tablet  Other Yes Yes   Sig: Take 150 mg by mouth 2 times daily Take 1/2 tablet (150mg) =300mg. Hold for systolic BP less than 120.   levothyroxine (SYNTHROID/LEVOTHROID) 50 MCG tablet  Other No Yes   Sig: TAKE ONE TABLET BY MOUTH EVERY DAY   lidocaine (XYLOCAINE) 2 % topical gel  Other No Yes   Sig: Apply topically as needed for moderate pain 10 ML every 3 weeks   loratadine (CLARITIN) 10 MG tablet  Other No Yes   Sig: Take 1 tablet (10 mg) by mouth daily   meclizine (ANTIVERT) 12.5 MG tablet  Other No Yes   Sig: Take 1 tablet (12.5 mg) by mouth 3 times daily as needed for dizziness   ondansetron (ZOFRAN ODT) 4 MG disintegrating tablet  Other No Yes   Sig: Take 1-2 tablets (4-8 mg) by mouth every 8 hours as needed for nausea   order for DME  Other No No   Sig: Equipment being ordered: Blue [employer]ux  Please send to [facility] medical   order for DME  Other No No   Sig: Equipment being ordered: [address] CPAP interface (nasal pillows), size XS.   order for DME  Other No No   Sig: Equipment being ordered: Silver Impregnated catheters  HX of frequent UTI   order for DME  Other No No   Sig: High back guerrero wheel chair  MICHELLE 99  DX: morbid obesity, generalized weakness, physical deconditioning, chronic vertigo   pravastatin (PRAVACHOL) 10 MG tablet  Other No Yes   Sig: TAKE ONE TABLET BY MOUTH EVERY DAY   predniSONE (DELTASONE) 5 MG tablet  Other No Yes   Sig: Take 1 tablet (5 mg) by mouth daily   simethicone (MYLICON) 125 MG CHEW  Other Yes Yes   Sig: Take 1 tablet (125 mg) by mouth as needed for intestinal gas   tacrolimus (GENERIC EQUIVALENT) 1 MG capsule  Other No Yes   Sig: Take 2 capsules (2 mg) by mouth 2 times daily Total dose 2.5 mg twice daily      Facility-Administered Medications: None     Allergies   Allergies   Allergen Reactions     Bactrim Ds [Sulfamethoxazole W/Trimethoprim]      Creatinine level increased     Atorvastatin Calcium      myalgias, muscle aches      Codeine Nausea and Vomiting     Darvocet [Propoxyphene N-Apap] Nausea and Vomiting     Hydromorphone      Levofloxacin Other (See Comments) and Diarrhea     hallucinations     Morphine      Nausea and vomiting     Niaspan [Niacin] GI Disturbance     Flushing even at low dose, GI upset     Percocet [Oxycodone-Acetaminophen]      Vomiting after taking med couple of times     Vicodin [Hydrocodone-Acetaminophen] Nausea and Vomiting       Social History   I have reviewed this patient's social history and updated it with pertinent information if needed. Gem Jade  reports that she quit smoking about 30 years ago. She quit after 17.00 years of use. She does not have any smokeless tobacco history on file. She reports that she does not drink alcohol or use illicit drugs.    Family History   I have reviewed this patient's family history and updated it with pertinent information if needed.   Family History   Problem Relation Age of Onset     DIABETES Mother      DIABETES Brother      Hypertension Father      [id number] pneumonia     HEART DISEASE Father        Review of Systems   The 10 point Review of Systems is negative other than noted in the HPI or here.  The patient continues to have altered mentation, thus history of present illness, review of systems, and other history are incomplete.    Physical Exam   Temp: 96.8  F (36  C)   BP: 143/74   Heart Rate: 62 Resp: 12 SpO2: 94 % O2 Device: None (Room air)    Vital Signs with Ranges  Temp:  [96.8  F (36  C)] 96.8  F (36  C)  Heart Rate:  [62-63] 62  Resp:  [12] 12  BP: (143-170)/(73-95) 143/74  SpO2:  [84 %-100 %] 94 %  238 lbs 0 oz    GENERAL: Patient is unresponsive to voice.  Does not follow commands.  Decorticate posturing and chronic contractures, flexion, of the upper extremities.  Extension contractures of the lower extremities appear chronic. Withdraws from painful stimuli.   HEENT: Normocephalic. No icterus or injection. Nares normal.  Nasal cannula in  place.  LUNGS: Clear to auscultation, low tidal volumes. No dyspnea at rest.   HEART: Regular rate. Extremities perfused.   ABDOMEN: Obese.  Soft, nontender, and nondistended. Positive bowel sounds.   EXTREMITIES: No LE edema noted. Extension contractures of the lower extremities appear chronic.   NEUROLOGIC: Pupils are equal and reactive. Does not follow commands.  Lethargic, encephalopathic.  Appears chronically deconditioned.  Contractures in all 4 extremities    Data   Data reviewed today:  I personally reviewed both laboratory and imaging data.     Recent Labs  Lab 01/11/18  1502   WBC 6.1   HGB 11.9   MCV 91      *   POTASSIUM 4.9   CHLORIDE 95   CO2 27   BUN 24   CR 1.20*   ANIONGAP 6   ROGER 8.5   *   ALBUMIN 3.1*   PROTTOTAL 8.2   BILITOTAL 0.3   ALKPHOS 86   ALT 16   AST 24       Recent Results (from the past 24 hour(s))   XR Chest 2 Views    Narrative    CHEST TWO VIEWS 1/11/2018 4:07 PM     HISTORY: Cough and altered mental status    COMPARISON: 12/19/2017     FINDINGS: There are no acute infiltrates. The cardiac silhouette is  not enlarged. Pulmonary vasculature is unremarkable.      Impression    IMPRESSION: No acute disease.    HERB REDDY MD   Head CT w/o contrast    Narrative    CT SCAN OF THE HEAD WITHOUT CONTRAST   1/11/2018 4:42 PM     HISTORY: Altered mental status.    TECHNIQUE: 5 mm thick axial images of the head without IV contrast  material. Radiation dose for this scan was reduced using automated  exposure control, adjustment of the mA and/or kV according to patient  size, or iterative reconstruction technique.    COMPARISON: 12/19/2017.    FINDINGS: There is no evidence of intracranial hemorrhage, midline  shift, or acute infarct. Bilateral atrophy changes noted of the brain  parenchyma. Small fluid at the bilateral maxillary sinuses and some  ethmoid sinus mucosal thickening. Mastoids appear clear. There is no  evidence for fracture.      Impression    IMPRESSION: No  acute intracranial hemorrhage, midline shift or acute  infarct. Stable-appearing atrophy changes. Small fluid at the  bilateral maxillary sinuses, along with ethmoid sinus mucosal  thickening.

## 2018-01-11 NOTE — IP AVS SNAPSHOT
[facility]    [address]    [place] [address]    Phone:  [phone]                                       After Visit Summary   1/11/2018    Gem Jade    MRN: [id number]           After Visit Summary Signature Page     I have received my discharge instructions, and my questions have been answered. I have discussed any challenges I see with this plan with the nurse or doctor.    ..........................................................................................................................................  Patient/Patient Representative Signature      ..........................................................................................................................................  Patient Representative Print Name and Relationship to Patient    ..................................................               ................................................  Date                                            Time    ..........................................................................................................................................  Reviewed by Signature/Title    ...................................................              ..............................................  Date                                                            Time

## 2018-01-11 NOTE — IP AVS SNAPSHOT
MRN:[id number]                      After Visit Summary   1/11/2018    Gem Jade    MRN: [id number]           Thank you!     Thank you for choosing [facility] for your care. Our goal is always to provide you with excellent care. Hearing back from our patients is one way we can continue to improve our services. Please take a few minutes to complete the written survey that you may receive in the mail after you visit with us. Thank you!        Patient Information     Date Of Birth          1945        Designated Caregiver       Most Recent Value    Caregiver    Will someone help with your care after discharge? yes    Name of designated caregiver Chang Jade, spouse    Phone number of caregiver see facesheet    Caregiver address see facesheet      About your hospital stay     You were admitted on:  January 11, 2018 You last received care in the:  [facility]    You were discharged on:  January 15, 2018        Reason for your hospital stay       Urinary tract infection                  Who to Call     For medical emergencies, please call 911.  For non-urgent questions about your medical care, please call your primary care provider or clinic, [phone]          Attending Provider     Provider Specialty    Eunice Prieto MD Emergency Medicine    Grant Haas,  Internal Medicine    Jeannette Hurtado MD Pulmonary       Primary Care Provider Office Phone # Fax #    ANTHONY Antonio [facility] [phone]      After Care Instructions     Activity       Your activity upon discharge: activity as tolerated            Diet       Follow this diet upon discharge: Orders Placed This Encounter      Low Consistent CHO Diet                  Follow-up Appointments     Follow-up and recommended labs and tests        Follow up with primary care provider, Marivel Barcenas NP, within 7 days for hospital follow- up.  No follow  "up labs or test are needed.                  Your next 10 appointments already scheduled     Feb 05, 2018  3:30 PM CST   Telephone Visit with ANTHONY Antonio CNP   [facility] ([facility])    [address] [facility]  [address] [place] [id number] [phone]           Note: this is not an onsite visit; there is no need to come to the facility.            Feb 06, 2018  3:30 PM CST   TELEMEDICINE with Conchita Toledo [facility] ([facility])    [address] [place] [address]  [address] [place] [id number] [phone]           Note: this is not an onsite visit; there is no need to come to the facility.              Additional Services     Home care nursing referral       Resumption of home care services     Your provider has ordered home care nursing services. If you have not been contacted within 2 days of your discharge please call the inpatient department phone number at [phone] .                  Pending Results     Date and Time Order Name Status Description    1/11/2018 1455 Blood culture Preliminary     1/11/2018 1455 Blood culture Preliminary             Statement of Approval     Ordered          01/15/18 1339  I have reviewed and agree with all the recommendations and orders detailed in this document.  EFFECTIVE NOW     Approved and electronically signed by:  Victoriano Arellano,              Admission Information     Date & Time Provider Department Dept. Phone    1/11/2018 Jeannette Hurtado MD [address] [facility] [phone]      Your Vitals Were     Blood Pressure Pulse Temperature Respirations Height Weight    156/70 (BP Location: Right arm) 79 98.1  F (36.7  C) (Oral) 16 1.676 m (5' 6\") 102.9 kg (226 lb 14.4 oz)    Pulse Oximetry BMI (Body Mass Index)                95% 36.62 kg/m2          MyChart Information     " [facility] gives you secure access to your electronic health record. If you see a primary care provider, you can also send messages to your care team and make appointments. If you have questions, please call your primary care clinic.  If you do not have a primary care provider, please call [phone] and they will assist you.        Care EveryWhere ID     This is your Care EveryWhere ID. This could be used by other organizations to access your [place] medical records  [id number]        Equal Access to Services     CARLOS ENRIQUE GREEN : Hadii aad ku hadasho Soomaali, waaxda luqadaha, qaybta kaalmada adeegyada, waxay sheyla roman. So [facility] [phone].    ATENCIÓN: Si habla español, tiene a peguero disposición servicios gratuitos de asistencia lingüística. [facility] [phone].    We comply with applicable federal civil rights laws and [place] laws. We do not discriminate on the basis of race, color, national origin, age, disability, sex, sexual orientation, or gender identity.               Review of your medicines      START taking        Dose / Directions    cephALEXin 500 MG capsule   Commonly known as:  KEFLEX   Used for:  Urinary tract infection associated with indwelling urethral catheter, initial encounter (H)        Dose:  500 mg   Take 1 capsule (500 mg) by mouth 4 times daily for 7 days   Quantity:  28 capsule   Refills:  0         CONTINUE these medicines which may have CHANGED, or have new prescriptions. If we are uncertain of the size of tablets/capsules you have at home, strength may be listed as something that might have changed.        Dose / Directions    insulin aspart 100 UNIT/ML injection   Commonly known as:  NovoLOG FLEXPEN   This may have changed:  additional instructions   Used for:  Type 2 diabetes mellitus with diabetic polyneuropathy, with long-term current use of insulin (H)        Inject 10 units under the skin 2 times daily before meals as directed. Add sliding scale as  needed. Avg use 25 units per day   Quantity:  15 mL   Refills:  11       insulin glargine 100 UNIT/ML injection   Commonly known as:  ARAM PELAEZ   This may have changed:    - how much to take  - how to take this  - when to take this  - additional instructions   Used for:  Type 2 diabetes mellitus with diabetic polyneuropathy, with long-term current use of insulin (H)        Inject 24 units under the skin daily.   Quantity:  15 mL   Refills:  11         CONTINUE these medicines which have NOT CHANGED        Dose / Directions    ACCU-CHEK COMPACT PLUS test strip   Generic drug:  blood glucose monitoring        Use to test blood sugar 3 times daily or as directed.  Ok to substitute alternative if insurance prefers.   Quantity:  100 strip   Refills:  prn       ACETAMINOPHEN PO        Dose:  325-650 mg   Take 325-650 mg by mouth every 4 hours as needed.   Refills:  0       aspirin 81 MG chewable tablet   Used for:  Pulmonary embolism and infarction (H), Diabetes mellitus with background retinopathy (H)        Dose:  81 mg   Take 1 tablet (81 mg) by mouth daily Starting 1 week after your kidney biopsy   Quantity:  90 tablet   Refills:  3       baclofen 10 MG tablet   Commonly known as:  LIORESAL        Dose:  10 mg   Take 1 tablet (10 mg) by mouth 3 times daily as needed for muscle spasms   Quantity:  90 tablet   Refills:  4       cloNIDine 0.1 MG tablet   Commonly known as:  CATAPRES   Used for:  HTN, goal below 140/90        TAKE ONE TABLET BY MOUTH TWICE A DAY   Quantity:  180 tablet   Refills:  3       Cranberry 400 MG Tabs        Dose:  400 mg   Take 400 mg by mouth 2 times daily   Refills:  0       cyanocobalamin 1000 MCG/ML injection   Commonly known as:  VITAMIN B12   Used for:  Iron deficiency anemia, unspecified iron deficiency anemia type        Dose:  1000 mcg   Inject 1 mL (1,000 mcg) into the muscle every 30 days   Quantity:  3 mL   Refills:  3       diphenoxylate-atropine 2.5-0.025 MG per tablet    Commonly known as:  LOMOTIL   Used for:  Diarrhea of presumed infectious origin        Dose:  1 tablet   Take 1 tablet by mouth 4 times daily as needed for diarrhea   Quantity:  30 tablet   Refills:  0       famotidine 20 MG tablet   Commonly known as:  PEPCID        Dose:  20 mg   Take 1 tablet (20 mg) by mouth 2 times daily   Quantity:  60 tablet   Refills:  1       gabapentin 300 MG capsule   Commonly known as:  NEURONTIN   Used for:  Diabetic polyneuropathy associated with type 2 diabetes mellitus (H)        Dose:  900 mg   Take 3 capsules (900 mg) by mouth 2 times daily For pain   Quantity:  360 capsule   Refills:  3       guaiFENesin 100 MG/5ML Syrp   Commonly known as:  ROBITUSSIN   Used for:  Acute nasopharyngitis        Dose:  10-20 mL   Take 10-20 mLs by mouth every 4 hours as needed for cough   Quantity:  560 mL   Refills:  3       insulin pen needle 30G X 8 MM   Used for:  Type 2 diabetes mellitus with diabetic nephropathy (H)        Use as directed with Lantus   Quantity:  100 each   Refills:  3       labetalol 300 MG tablet   Commonly known as:  NORMODYNE   Used for:  Benign essential hypertension, Kidney replaced by transplant        Dose:  150 mg   Take 150 mg by mouth 2 times daily Take 1/2 tablet (150mg) =300mg. Hold for systolic BP less than 120.   Quantity:  120 tablet   Refills:  3       lactobacillus rhamnosus (GG) capsule   Used for:  Diarrhea        Dose:  1 capsule   Take 1 capsule by mouth 2 times daily   Quantity:  60 capsule   Refills:  11       levothyroxine 50 MCG tablet   Commonly known as:  SYNTHROID/LEVOTHROID   Used for:  Other specified hypothyroidism        TAKE ONE TABLET BY MOUTH EVERY DAY   Quantity:  90 tablet   Refills:  3       lidocaine 2 % topical gel   Commonly known as:  XYLOCAINE   Used for:  Johnson catheter in place        Apply topically as needed for moderate pain 10 ML every 3 weeks   Quantity:  30 mL   Refills:  11       loratadine 10 MG tablet   Commonly known  as:  CLARITIN   Used for:  Acute nasopharyngitis        Dose:  10 mg   Take 1 tablet (10 mg) by mouth daily   Quantity:  90 tablet   Refills:  3       meclizine 12.5 MG tablet   Commonly known as:  ANTIVERT   Used for:  Disorientation        Dose:  12.5 mg   Take 1 tablet (12.5 mg) by mouth 3 times daily as needed for dizziness   Quantity:  30 tablet   Refills:  0       ondansetron 4 MG ODT tab   Commonly known as:  ZOFRAN ODT   Used for:  Nausea        Dose:  4-8 mg   Take 1-2 tablets (4-8 mg) by mouth every 8 hours as needed for nausea   Quantity:  20 tablet   Refills:  5       order for DME   Used for:  Johnson catheter status        Equipment being ordered: Johnson Catheters - 18 french   Quantity:  3 catheter   Refills:  PRN       * order for DME   Used for:  Fecal incontinence        Equipment being ordered: Blue Chux Please send to [facility]   Quantity:  100 pad   Refills:  3       * order for DME   Used for:  Obstructive sleep apnea syndrome        Equipment being ordered: [employer] FX CPAP interface (nasal pillows), size XS.   Quantity:  1 each   Refills:  1       * order for DME   Used for:  Recurrent UTI        Equipment being ordered: Silver Impregnated catheters HX of frequent UTI   Quantity:  12 each   Refills:  1       * order for DME   Used for:  Morbid obesity, unspecified obesity type (H), Vertigo, Generalized muscle weakness, Physical deconditioning        High back guerrero wheel chair MICHELLE 99 DX: morbid obesity, generalized weakness, physical deconditioning, chronic vertigo   Quantity:  1 Device   Refills:  0       pravastatin 10 MG tablet   Commonly known as:  PRAVACHOL   Used for:  Hyperlipidemia LDL goal <100        TAKE ONE TABLET BY MOUTH EVERY DAY   Quantity:  90 tablet   Refills:  3       predniSONE 5 MG tablet   Commonly known as:  DELTASONE   Used for:  Kidney replaced by transplant        Dose:  5 mg   Take 1 tablet (5 mg) by mouth daily   Quantity:  90 tablet   Refills:  3       simethicone  125 MG Chew chewable tablet   Commonly known as:  MYLICON        Dose:  125 mg   Take 1 tablet (125 mg) by mouth as needed for intestinal gas   Quantity:  120 tablet   Refills:  0       tacrolimus 1 MG capsule   Commonly known as:  GENERIC EQUIVALENT   Used for:  Kidney transplant recipient, Long-term use of immunosuppressant medication        Dose:  2 mg   Take 2 capsules (2 mg) by mouth 2 times daily Total dose 2.5 mg twice daily   Quantity:  120 capsule   Refills:  11       TraMADol HCl 50 MG Tbdp   Indication:  Moderate to Moderately Severe Pain   Used for:  Chronic shoulder pain, unspecified laterality        Dose:  50 mg   Take 50 mg by mouth 3 times daily as needed   Quantity:  90 tablet   Refills:  3       TRILEPTAL 300 MG tablet   Used for:  Diabetic polyneuropathy associated with diabetes mellitus due to underlying condition (H)   Generic drug:  OXcarbazepine        Dose:  300 mg   Take 1 tablet (300 mg) by mouth 2 times daily For pain   Quantity:  180 tablet   Refills:  3       VITAMIN D (CHOLECALCIFEROL) PO        Dose:  4000 Units   Take 4,000 Units by mouth 2 times daily   Refills:  0       * Notice:  This list has 4 medication(s) that are the same as other medications prescribed for you. Read the directions carefully, and ask your doctor or other care provider to review them with you.         Where to get your medicines      These medications were sent to [facility] [place], [place] - [address]  [address], [place] [address]     Phone:  [phone]     cephALEXin 500 MG capsule               ANTIBIOTIC INSTRUCTION     You've Been Prescribed an Antibiotic - Now What?  Your healthcare team thinks that you or your loved one might have an infection. Some infections can be treated with antibiotics, which are powerful, life-saving drugs. Like all medications, antibiotics have side effects and should only be used when necessary. There are some important things you should  know about your antibiotic treatment.      Your healthcare team may run tests before you start taking an antibiotic.    Your team may take samples (e.g., from your blood, urine or other areas) to run tests to look for bacteria. These test can be important to determine if you need an antibiotic at all and, if you do, which antibiotic will work best.      Within a few days, your healthcare team might change or even stop your antibiotic.    Your team may start you on an antibiotic while they are working to find out what is making you sick.    Your team might change your antibiotic because test results show that a different antibiotic would be better to treat your infection.    In some cases, once your team has more information, they learn that you do not need an antibiotic at all. They may find out that you don't have an infection, or that the antibiotic you're taking won't work against your infection. For example, an infection caused by a virus can't be treated with antibiotics. Staying on an antibiotic when you don't need it is more likely to be harmful than helpful.      You may experience side effects from your antibiotic.    Like all medications, antibiotics have side effects. Some of these can be serious.    Let you healthcare team know if you have any known allergies when you are admitted to the hospital.    One significant side effect of nearly all antibiotics is the risk of severe and sometimes deadly diarrhea caused by Clostridium difficile (C. Difficile). This occurs when a person takes antibiotics because some good germs are destroyed. Antibiotic use allows C. diificile to take over, putting patients at high risk for this serious infection.    As a patient or caregiver, it is important to understand your or your loved one's antibiotic treatment. It is especially important for caregivers to speak up when patients can't speak for themselves. Here are some important questions to ask your healthcare  team.    What infection is this antibiotic treating and how do you know I have that infection?    What side effects might occur from this antibiotic?    How long will I need to take this antibiotic?    Is it safe to take this antibiotic with other medications or supplements (e.g., vitamins) that I am taking?     Are there any special directions I need to know about taking this antibiotic? For example, should I take it with food?    How will I be monitored to know whether my infection is responding to the antibiotic?    What tests may help to make sure the right antibiotic is prescribed for me?      Information provided by:  www.cdc.gov/getsmart  U.S. Department of Health and Human Services  Centers for disease Control and Prevention  National Center for Emerging and Zoonotic Infectious Diseases  Division of Healthcare Quality Promotion         Protect others around you: Learn how to safely use, store and throw away your medicines at www.disposemymeds.org.             Medication List: This is a list of all your medications and when to take them. Check marks below indicate your daily home schedule. Keep this list as a reference.      Medications           Morning Afternoon Evening Bedtime As Needed    ACCU-CHEK COMPACT PLUS test strip   Use to test blood sugar 3 times daily or as directed.  Ok to substitute alternative if insurance prefers.   Generic drug:  blood glucose monitoring                                ACETAMINOPHEN PO   Take 325-650 mg by mouth every 4 hours as needed.   Last time this was given:  650 mg on 1/14/2018  8:43 PM   Next Dose Due:  As needed anytime                                   aspirin 81 MG chewable tablet   Take 1 tablet (81 mg) by mouth daily Starting 1 week after your kidney biopsy   Last time this was given:  81 mg on 1/15/2018  9:21 AM   Next Dose Due:  1/16 am                                   baclofen 10 MG tablet   Commonly known as:  LIORESAL   Take 1 tablet (10 mg) by mouth 3  times daily as needed for muscle spasms   Last time this was given:  10 mg on 1/15/2018 11:46 AM   Next Dose Due:  As needed, gave second dose 1/15 at 5:40pm, one more dose today                                   cephALEXin 500 MG capsule   Commonly known as:  KEFLEX   Take 1 capsule (500 mg) by mouth 4 times daily for 7 days   Next Dose Due:  Start tomorrow morning 1/16                                            cloNIDine 0.1 MG tablet   Commonly known as:  CATAPRES   TAKE ONE TABLET BY MOUTH TWICE A DAY   Last time this was given:  0.1 mg on 1/15/2018  9:20 AM   Next Dose Due:  1/15 pm                                      Cranberry 400 MG Tabs   Take 400 mg by mouth 2 times daily   Next Dose Due:  resume                                cyanocobalamin 1000 MCG/ML injection   Commonly known as:  VITAMIN B12   Inject 1 mL (1,000 mcg) into the muscle every 30 days   Next Dose Due:  resume                                diphenoxylate-atropine 2.5-0.025 MG per tablet   Commonly known as:  LOMOTIL   Take 1 tablet by mouth 4 times daily as needed for diarrhea   Next Dose Due:  As needed                                famotidine 20 MG tablet   Commonly known as:  PEPCID   Take 1 tablet (20 mg) by mouth 2 times daily   Next Dose Due:  resume                                gabapentin 300 MG capsule   Commonly known as:  NEURONTIN   Take 3 capsules (900 mg) by mouth 2 times daily For pain   Last time this was given:  900 mg on 1/15/2018 11:46 AM   Next Dose Due:  1/15 pm                                      guaiFENesin 100 MG/5ML Syrp   Commonly known as:  ROBITUSSIN   Take 10-20 mLs by mouth every 4 hours as needed for cough   Next Dose Due:  As needed                                insulin aspart 100 UNIT/ML injection   Commonly known as:  NovoLOG FLEXPEN   Inject 10 units under the skin 2 times daily before meals as directed. Add sliding scale as needed. Avg use 25 units per day   Next Dose Due:  resume                                 insulin glargine 100 UNIT/ML injection   Commonly known as:  LANTUS SOLOSTAR   Inject 24 units under the skin daily.   Next Dose Due:  resume                                insulin pen needle 30G X 8 MM   Use as directed with Lantus   Next Dose Due:  resume                                labetalol 300 MG tablet   Commonly known as:  NORMODYNE   Take 150 mg by mouth 2 times daily Take 1/2 tablet (150mg) =300mg. Hold for systolic BP less than 120.   Last time this was given:  150 mg on 1/15/2018  9:20 AM   Next Dose Due:  1/15 pm                                      lactobacillus rhamnosus (GG) capsule   Take 1 capsule by mouth 2 times daily   Next Dose Due:  resume                                levothyroxine 50 MCG tablet   Commonly known as:  SYNTHROID/LEVOTHROID   TAKE ONE TABLET BY MOUTH EVERY DAY   Last time this was given:  50 mcg on 1/15/2018  9:19 AM   Next Dose Due:  1/16 am                                   lidocaine 2 % topical gel   Commonly known as:  XYLOCAINE   Apply topically as needed for moderate pain 10 ML every 3 weeks   Next Dose Due:  resume                                loratadine 10 MG tablet   Commonly known as:  CLARITIN   Take 1 tablet (10 mg) by mouth daily   Next Dose Due:  resume                                meclizine 12.5 MG tablet   Commonly known as:  ANTIVERT   Take 1 tablet (12.5 mg) by mouth 3 times daily as needed for dizziness   Next Dose Due:  As needed                                ondansetron 4 MG ODT tab   Commonly known as:  ZOFRAN ODT   Take 1-2 tablets (4-8 mg) by mouth every 8 hours as needed for nausea   Next Dose Due:  Gave dose at discharge at 5:40pm- 4mg                                order for DME   Equipment being ordered: Johnson Catheters - 18 french                                * order for DME   Equipment being ordered: Blue Chux Please send to [facility]                                * order for DME   Equipment being ordered: [employer] FX CPAP  interface (nasal pillows), size XS.                                * order for DME   Equipment being ordered: Silver Impregnated catheters HX of frequent UTI                                * order for DME   High back guerrero wheel chair MICHELLE 99 DX: morbid obesity, generalized weakness, physical deconditioning, chronic vertigo                                pravastatin 10 MG tablet   Commonly known as:  PRAVACHOL   TAKE ONE TABLET BY MOUTH EVERY DAY   Last time this was given:  10 mg on 1/15/2018  9:20 AM   Next Dose Due:  1/16 am                                   predniSONE 5 MG tablet   Commonly known as:  DELTASONE   Take 1 tablet (5 mg) by mouth daily   Last time this was given:  5 mg on 1/15/2018  9:20 AM   Next Dose Due:  1/16 am                                   simethicone 125 MG Chew chewable tablet   Commonly known as:  MYLICON   Take 1 tablet (125 mg) by mouth as needed for intestinal gas   Next Dose Due:  As needed                                tacrolimus 1 MG capsule   Commonly known as:  GENERIC EQUIVALENT   Take 2 capsules (2 mg) by mouth 2 times daily Total dose 2.5 mg twice daily   Last time this was given:  2 mg on 1/15/2018  9:20 AM   Next Dose Due:  1/15 pm                                      TraMADol HCl 50 MG Tbdp   Take 50 mg by mouth 3 times daily as needed   Next Dose Due:  As needed                                TRILEPTAL 300 MG tablet   Take 1 tablet (300 mg) by mouth 2 times daily For pain   Last time this was given:  300 mg on 1/15/2018  9:20 AM   Generic drug:  OXcarbazepine   Next Dose Due:  1/15 pm                                      VITAMIN D (CHOLECALCIFEROL) PO   Take 4,000 Units by mouth 2 times daily   Last time this was given:  4,000 Units on 1/15/2018  9:19 AM   Next Dose Due:  1/15 pm                                      * Notice:  This list has 4 medication(s) that are the same as other medications prescribed for you. Read the directions carefully, and ask your doctor or  other care provider to review them with you.              More Information        Catheter-Associated Urinary Tract Infections     A small balloon keeps the catheter in place inside the bladder.   A catheter-associated urinary tract infection (CAUTI) is an infection of the urinary system. CAUTI is caused by bacteria that enter the urinary tract when a urinary catheter is used. This is a tube that s placed into the bladder to drain urine.  The urinary system  This system includes the kidneys, ureters, bladder, and urethra. The kidneys filter blood and make urine. The ureters carry urine from the kidneys to the bladder. The bladder stores urine. The urethra carries urine from the bladder to the outside of the body.  What is a urinary catheter?  A urinary catheter is a thin, flexible tube. It is placed in the bladder to drain urine. Urine flows through the tube into a collecting bag outside of the body. There are different types of urinary catheters. The most common type is an indwelling catheter. This is also known as a urethral catheter. This is because it s placed into the bladder through the urethra. This catheter is also called a Johnson catheter.  Why is a urinary catheter needed?  A urinary catheter is needed for any of the following:    You can t get up to use the toilet because your mobility is limited. This may be due to surgery, an injury, or illness.    You have a blockage in your urinary system.    Your healthcare provider needs to measure the amount of urine you pass.    The function of your kidneys and bladder is being tested.    You re not able to control your bladder (incontinence).  In most cases, the urinary catheter is temporary. You'll need it only until the problem that requires it is resolved.   How does a CAUTI develop?  Bacteria can enter the urinary tract as the catheter is put into the urethra. Bacteria can also get into the urinary tract while the catheter is in place. The common bacteria that  cause a CAUTI are ones that live in the intestine. These bacteria don t normally cause problems in the intestine. But when they get into the urinary tract, a CAUTI can result.  Why is a CAUTI of concern?  Left untreated, a CAUTI can lead to health problems. These problems may include bladder infection, prostate infection, and kidney infection. A CAUTI can prolong your hospital stay. If the infection is not treated in time, serious health complications may occur.  What are the symptoms of a CAUTI?    A burning feeling, pressure, or pain in your lower abdomen    Fever or chills    Urine in the collecting bag is cloudy or bloody (pink or red)    Burning feeling in the urethra or genital area    Aching in the back (kidney area)    Nausea and vomiting    Person is confused, or is not alert, or has a change in behavior (mainly affects older patients)    Note that sometimes a person won t have any symptoms but may still have a CAUTI.  Tell a healthcare provider right away if you or your loved one has any of these symptoms.  How is CAUTI diagnosed?  If you have symptoms of CAUTI your healthcare provider will order tests. These include a urine test, blood tests, and other tests as needed.  How is CAUTI treated?   Treatment may involve any of the following:    Antibiotics. Your healthcare provider will likely prescribe antibiotics if you have symptoms. Be aware that if you don t have symptoms, you may not be given antibiotics. This is to prevent an increase in bacteria that resist (can t be killed by) certain antibiotics.    Removing the catheter. The catheter will be removed when your healthcare provider decides it s no longer needed. This usually helps stop the infection.    Changing the catheter. If you still need a catheter, the old one will be removed. A new one will be put in. This may help stop the infection.  How do hospital and long-term facility staff prevent CAUTI?  To keep patients from getting a CAUTI, the staff  follow certain procedures:    Prescribe a catheter only when it s needed. It is removed as soon as it s no longer needed.    Use sterile (clean) technique when placing the catheter into the urinary tract. This means before putting the catheter in, the caregiver washes his or her hands with soap and water. He or she then puts on sterile gloves. A sterile catheter kit that has cleansers is used to cleanse the patient s genital area.    Before performing catheter care, caregivers also wash their hands or use an alcohol-based hand cleanser.    Hang the bag lower than your bladder. This prevents urine from flowing back into your bladder.    Ensure that the bag is emptied regularly.  What you can do as a patient to prevent CAUTI  You can help prevent yourself from getting a CAUTI by doing the following:    Every day ask your healthcare provider how long you need to have the catheter. The longer you have a catheter, the higher your chance of getting a CAUTI.    If a caregiver doesn t clean his or her hands and put on gloves before touching your catheter, ask them to do so.    If you ve been taught how to care for your catheter, be sure to wash your hands before and after each session.    Make sure your bag is lower than your bladder. If it s not, tell your caregiver.    Don t disconnect the catheter and drain tube. Doing so allows germs to get into the catheter.    Cleansing of the genital and perineal areas is very important to help decrease bacteria in areas surrounding the catheter. Ask your doctor what you should use and how often to clean these areas.  If you are discharged with an indwelling catheter    Before you leave the hospital, make sure you understand the instructions on how to care for your catheter at home.    Ask your healthcare provider how long you need the catheter. Also ask if you need to make a follow-up appointment to have the catheter removed.    Always use sterile (clean) technique when caring for  your catheter. Wash your hands before and after doing any catheter care.    Call your healthcare provider right away if you develop symptoms of a CAUTI (see above).     Date Last Reviewed: 1/1/2017 2000-2017 [facility]. [address] [place] [address], [place] [address]. All rights reserved. This information is not intended as a substitute for professional medical care. Always follow your healthcare professional's instructions.

## 2018-01-11 NOTE — ED NOTES
Bed: ED06  Expected date:   Expected time:   Means of arrival:   Comments:  516  72 F altered ms  [id number]

## 2018-01-11 NOTE — ED PROVIDER NOTES
History     Chief Complaint:  Altered Mental Status    The history is provided by the EMS personnel. The history is limited by the condition of the patient.      Gem Jade is a 72 year old female who presents with altered mental status. The patient reportedly lives at home with home health appointments. EMS was called today as patient was at decreased mental status from baseline as he was only responding to painful stimuli in a sternal rub. She was recently seen here for similar symptoms and was found to have a UTI. A rico catheter is in place. EMS reports that O2 was at 88% for family, normal for medics. Blood glucose was 28 and patient was given D50 in route. No other symptoms reported.    Allergies:  Bactrim  Atorvastatin  Codeine  Hydromorphone  Levofloxacin  Morphine  Niaspan  Percocet  Vicodin     Medications:    tacrolimus (GENERIC EQUIVALENT) 1 MG capsule  baclofen (LIORESAL) 10 MG tablet  gabapentin (NEURONTIN) 300 MG capsule  Cholecalciferol (VITAMIN D) 2000 UNITS tablet  lidocaine (XYLOCAINE) 2 % topical gel  guaiFENesin (ROBITUSSIN) 100 MG/5ML SYRP  levothyroxine (SYNTHROID/LEVOTHROID) 50 MCG tablet  diphenoxylate-atropine (LOMOTIL) 2.5-0.025 MG per tablet  meclizine (ANTIVERT) 12.5 MG tablet  cloNIDine (CATAPRES) 0.1 MG tablet\  predniSONE (DELTASONE) 5 MG tablet  labetalol (NORMODYNE) 300 MG tablet  loratadine (CLARITIN) 10 MG tablet  pravastatin (PRAVACHOL) 10 MG tablet  OXcarbazepine (TRILEPTAL) 300 MG tablet  aspirin 81 MG chewable tablet  cyanocobalamin (VITAMIN B12) 1000 MCG/ML injection  TraMADol HCl 50 MG TBDP  ondansetron (ZOFRAN ODT) 4 MG disintegrating tablet  simethicone (MYLICON) 125 MG CHEW  Lactobacillus Rhamnosus, GG, (CULTURELL) capsule  Cranberry 400 MG TABS  famotidine (PEPCID) 20 MG tablet  ACETAMINOPHEN PO    Past Medical History:    Background diabetic retinopathy  Diabetic gastroparesis  Diarrhea  Dizziness and giddiness  Hyponatremia  Obesity  Osteopenia  Other  "unspecified hyperlipidemia  Other pulmonary embolism and infarction  Polyneuropathy in diabetes   Type 2 diabetes mellitus  UTI     Past Surgical History:    Kidney transplant  Total knee replacement, right  Cholecystectomy  Heart catheterization, left  Bilateral cataract repair    Family History:    Mother-diabetes  Father-heart disease, hypertension  Brother-diabetes    Social History:  Smoking status: Former smoker  Alcohol use: No  Marital Status:        Review of Systems   Unable to perform ROS: Mental status change     Physical Exam     Patient Vitals for the past 24 hrs:   BP Temp Temp src Heart Rate Resp SpO2 Height Weight   01/11/18 2054 152/81 97.4  F (36.3  C) Axillary 76 11 98 % - -   01/11/18 2034 - - - - - 100 % - -   01/11/18 1910 - - - 73 11 99 % - -   01/11/18 1900 (!) 171/94 - - 72 10 100 % - -   01/11/18 1853 - - - 73 18 99 % - -   01/11/18 1830 (!) 152/101 - - - - - - -   01/11/18 1811 - - - 72 12 99 % - -   01/11/18 1801 - - - 72 15 100 % - -   01/11/18 1800 164/85 - - - - - - -   01/11/18 1730 161/81 - - - - - - -   01/11/18 1700 157/84 - - 64 11 98 % - -   01/11/18 1638 143/74 - - 62 12 94 % - -   01/11/18 1622 - - - 63 12 98 % - -   01/11/18 1607 (!) 170/95 - - - - - - -   01/11/18 1538 - - - - - 99 % - -   01/11/18 1536 154/85 - - - - - - -   01/11/18 1504 145/73 - - - - 100 % - -   01/11/18 1441 - 96.8  F (36  C) - - - - - -   01/11/18 1440 - - - - - 97 % - -   01/11/18 1436 164/80 - - 62 12 (!) 84 % 1.676 m (5' 6\") 108 kg (238 lb)   01/11/18 1434 164/80 - - - - - - -         Physical Exam  General/Appearance: appears stated age, well-groomed, appears comfortable however minimally interactive. Swallowing, protecting airway. Attempts to open eyes on command but otherwise only interactive to sternal rub. Does localize as she covered her mouth when she coughed.   Eyes: No scleral injection, no icterus. Pinpoint pupils.   ENT: MMM  Neck: supple, nl ROM, no stiffness  Cardiovascular: RRR, " nl S1S2, no m/r/g, 2+ pulses in all 4 extremities, cap refill <2sec  Respiratory: CTAB, good air movement throughout, no wheezes/rhonchi/rales, no increased WOB, no retractions  Back: no lesions  GI: abd soft, non-distended, no obvious discomfort to palpation. no HSM, no rebound, no guarding, nl BS  MSK: SANCHEZ, good tone, no bony abnormality  Skin: warm and well-perfused, no rash, no edema, no ecchymosis, nl turgor  Neuro: GCS 6. Swallowing, protecting airway. Attempts to open eyes on command but otherwise only interactive to sternal rub. Does localize as she covered her mouth when she coughed.   Psych: Deferred.  Heme: no petechia, no purpura, no active bleeding    Emergency Department Course   Imaging:  Head CT without contrast:  IMPRESSION: No acute intracranial hemorrhage, midline shift or acute  infarct. Stable-appearing atrophy changes. Small fluid at the  bilateral maxillary sinuses, along with ethmoid sinus mucosal  thickening.  Report per radiology.     XR Chest 2 views  IMPRESSION: No acute disease.  Report per radiology.     Laboratory:  CBC:  WBC 6.1, HGB 11.9, , WNL  CMP:  (L), Glucose 119 (H), Creatinine 1.20 (H), GFR 44 (L), Albumin 3.1 (L), otherwise WNL   Lactic Acid: 0.7  (1439) Glucose by meter: 152 (H)   (1744) Glucose by meter: 66 (L)   Blood culture #1: pending  Blood culture #2: pending    UA: Clear Turbid urine, , Blood Large, Albumin >=300, Nitrite positive, leukocyte esterase large, WBC >100, RBC 5-10, WBC Clumps present, Bacteria Many, otherwise WNL  Urine Culture: pending    Interventions:  (1520) Normal Saline, 1 liter, IV bolus  (1535) Narcan 0.4 mg, IV Injection  (1657) Meropenem, 500 mg, IV Influsion  (1749) Dextrose 50% injection, 50 mL, IV Injection    Emergency Department Course:  Nursing notes and vitals reviewed.  (4768) I performed an exam of the patient as documented above.    The patient was sent for a CT scan and x-ray while in the emergency department,  findings above.   Blood was drawn from the patient. This was sent for laboratory testing, findings above.   Urine sample was obtained and sent for laboratory analysis, findings above.    Findings and plan explained to the patient who consents to admission.   (1468) I discussed the patient with Dr. Haas of the hospitalist service, who will admit the patient to a med bed for further monitoring, evaluation, and treatment.     No D5 was available for the patient.    (0605) Patient;s glucose dropped down so patient was given D50 and started on D5.  Impression & Plan    Medical Decision Making:  This patient is a 72-year-old female who lives at home with home health assistance who presents with altered mental status and hypoglycemia.  Of note she has an indwelling catheter.  I suspected an infection as the etiology of her hypoglycemia as well as her altered mental status.  We change the catheter.  This did grow out a urinalysis that was positive including positive for nitrates.  Looking back she had a similar presentation in early winter however at that time urinalysis looked positive and only grew out candidiasis.  That time however the nitrate was negative, making me suspect that this is less likely to be colonized and more likely to be true UTI.  She has VRE and ESBL.  Based on the empiric recommendations from infectious disease from her previous admission I will start her on meropenem.  I considered other etiologies for her altered mental status such as electrolyte abnormalities, renal failure, intracranial bleed.  Head CT was negative, labs other than a glucose were not remarkable to the point that I would expect them to be causing these symptoms.  She was hyponatremic.  Her glucose did respond to D50 however did begin to trend downwards again at which point we give her more D50 and started her on a D5 drip.  She will be coming into the hospital for IV antibiotics and more intensive management.    Diagnosis:     ICD-10-CM   1. Urinary tract infection associated with indwelling urethral catheter, initial encounter (H) T83.511A    N39.0   2. Encephalopathy G93.40       Disposition:  Patient is admitted to a [facility] bed under the care of Dr. Haas.      I, Domingo Girard, am serving as a scribe on 1/11/2018 at 2:46 PM to personally document services performed by Dr. Prieto based on my observations and the provider's statements to me.      Domingo Girard  1/11/2018    EMERGENCY DEPARTMENT       Eunice Prieto MD  01/11/18 2122

## 2018-01-12 ENCOUNTER — TELEPHONE (OUTPATIENT)
Dept: TRANSPLANT | Facility: CLINIC | Age: 73
End: 2018-01-12

## 2018-01-12 ENCOUNTER — ANESTHESIA EVENT (OUTPATIENT)
Dept: INTENSIVE CARE | Facility: CLINIC | Age: 73
DRG: 698 | End: 2018-01-12
Payer: MEDICARE

## 2018-01-12 ENCOUNTER — APPOINTMENT (OUTPATIENT)
Dept: GENERAL RADIOLOGY | Facility: CLINIC | Age: 73
DRG: 698 | End: 2018-01-12
Attending: INTERNAL MEDICINE
Payer: MEDICARE

## 2018-01-12 ENCOUNTER — ANESTHESIA (OUTPATIENT)
Dept: INTENSIVE CARE | Facility: CLINIC | Age: 73
DRG: 698 | End: 2018-01-12
Payer: MEDICARE

## 2018-01-12 PROBLEM — G40.901 STATUS EPILEPTICUS (H): Status: ACTIVE | Noted: 2018-01-12

## 2018-01-12 LAB
ANION GAP SERPL CALCULATED.3IONS-SCNC: 14 MMOL/L (ref 3–14)
ANION GAP SERPL CALCULATED.3IONS-SCNC: 6 MMOL/L (ref 3–14)
BACTERIA SPEC CULT: ABNORMAL
BUN SERPL-MCNC: 20 MG/DL (ref 7–30)
BUN SERPL-MCNC: 27 MG/DL (ref 7–30)
CALCIUM SERPL-MCNC: 8.2 MG/DL (ref 8.5–10.1)
CALCIUM SERPL-MCNC: 8.3 MG/DL (ref 8.5–10.1)
CHLORIDE SERPL-SCNC: 100 MMOL/L (ref 94–109)
CHLORIDE SERPL-SCNC: 100 MMOL/L (ref 94–109)
CO2 SERPL-SCNC: 18 MMOL/L (ref 20–32)
CO2 SERPL-SCNC: 26 MMOL/L (ref 20–32)
CREAT SERPL-MCNC: 1.06 MG/DL (ref 0.52–1.04)
CREAT SERPL-MCNC: 1.16 MG/DL (ref 0.52–1.04)
ERYTHROCYTE [DISTWIDTH] IN BLOOD BY AUTOMATED COUNT: 14.1 % (ref 10–15)
GFR SERPL CREATININE-BSD FRML MDRD: 46 ML/MIN/1.7M2
GFR SERPL CREATININE-BSD FRML MDRD: 51 ML/MIN/1.7M2
GLUCOSE BLDC GLUCOMTR-MCNC: 105 MG/DL (ref 70–99)
GLUCOSE BLDC GLUCOMTR-MCNC: 118 MG/DL (ref 70–99)
GLUCOSE BLDC GLUCOMTR-MCNC: 119 MG/DL (ref 70–99)
GLUCOSE BLDC GLUCOMTR-MCNC: 119 MG/DL (ref 70–99)
GLUCOSE BLDC GLUCOMTR-MCNC: 131 MG/DL (ref 70–99)
GLUCOSE BLDC GLUCOMTR-MCNC: 131 MG/DL (ref 70–99)
GLUCOSE BLDC GLUCOMTR-MCNC: 90 MG/DL (ref 70–99)
GLUCOSE SERPL-MCNC: 131 MG/DL (ref 70–99)
GLUCOSE SERPL-MCNC: 23 MG/DL (ref 70–99)
HBA1C MFR BLD: 6.3 % (ref 4.3–6)
HCT VFR BLD AUTO: 35.2 % (ref 35–47)
HGB BLD-MCNC: 11.7 G/DL (ref 11.7–15.7)
Lab: ABNORMAL
MCH RBC QN AUTO: 30.1 PG (ref 26.5–33)
MCHC RBC AUTO-ENTMCNC: 33.2 G/DL (ref 31.5–36.5)
MCV RBC AUTO: 91 FL (ref 78–100)
PLATELET # BLD AUTO: 142 10E9/L (ref 150–450)
POTASSIUM SERPL-SCNC: 4.3 MMOL/L (ref 3.4–5.3)
POTASSIUM SERPL-SCNC: 4.9 MMOL/L (ref 3.4–5.3)
PROCALCITONIN SERPL-MCNC: 0.11 NG/ML
RBC # BLD AUTO: 3.89 10E12/L (ref 3.8–5.2)
SODIUM SERPL-SCNC: 132 MMOL/L (ref 133–144)
SODIUM SERPL-SCNC: 132 MMOL/L (ref 133–144)
SPECIMEN SOURCE: ABNORMAL
WBC # BLD AUTO: 5.4 10E9/L (ref 4–11)

## 2018-01-12 PROCEDURE — 25000128 H RX IP 250 OP 636: Performed by: INTERNAL MEDICINE

## 2018-01-12 PROCEDURE — 99291 CRITICAL CARE FIRST HOUR: CPT | Mod: 25 | Performed by: INTERNAL MEDICINE

## 2018-01-12 PROCEDURE — 40000986 XR CHEST PORT 1 VW

## 2018-01-12 PROCEDURE — A9270 NON-COVERED ITEM OR SERVICE: HCPCS | Mod: GY | Performed by: INTERNAL MEDICINE

## 2018-01-12 PROCEDURE — 25000128 H RX IP 250 OP 636

## 2018-01-12 PROCEDURE — 36415 COLL VENOUS BLD VENIPUNCTURE: CPT | Performed by: INTERNAL MEDICINE

## 2018-01-12 PROCEDURE — 83036 HEMOGLOBIN GLYCOSYLATED A1C: CPT | Performed by: HOSPITALIST

## 2018-01-12 PROCEDURE — 87641 MR-STAPH DNA AMP PROBE: CPT | Performed by: INTERNAL MEDICINE

## 2018-01-12 PROCEDURE — 99233 SBSQ HOSP IP/OBS HIGH 50: CPT | Performed by: INTERNAL MEDICINE

## 2018-01-12 PROCEDURE — 31500 INSERT EMERGENCY AIRWAY: CPT

## 2018-01-12 PROCEDURE — 40000061 ZZH STATISTIC EEG TIME EA 10 MIN

## 2018-01-12 PROCEDURE — 84145 PROCALCITONIN (PCT): CPT | Performed by: INTERNAL MEDICINE

## 2018-01-12 PROCEDURE — 99207 ZZC CDG-MDM COMPONENT: MEETS MODERATE - UP CODED: CPT | Performed by: INTERNAL MEDICINE

## 2018-01-12 PROCEDURE — 25000128 H RX IP 250 OP 636: Performed by: HOSPITALIST

## 2018-01-12 PROCEDURE — 80048 BASIC METABOLIC PNL TOTAL CA: CPT | Performed by: HOSPITALIST

## 2018-01-12 PROCEDURE — 31500 INSERT EMERGENCY AIRWAY: CPT | Performed by: NURSE ANESTHETIST, CERTIFIED REGISTERED

## 2018-01-12 PROCEDURE — 25000132 ZZH RX MED GY IP 250 OP 250 PS 637: Mod: GY | Performed by: INTERNAL MEDICINE

## 2018-01-12 PROCEDURE — 36415 COLL VENOUS BLD VENIPUNCTURE: CPT | Performed by: HOSPITALIST

## 2018-01-12 PROCEDURE — 95812 EEG 41-60 MINUTES: CPT

## 2018-01-12 PROCEDURE — 40000671 ZZH STATISTIC ANESTHESIA CASE

## 2018-01-12 PROCEDURE — 95951 ZZHC EEG VIDEO EACH 24 HR: CPT

## 2018-01-12 PROCEDURE — 25000128 H RX IP 250 OP 636: Performed by: NURSE PRACTITIONER

## 2018-01-12 PROCEDURE — 00000146 ZZHCL STATISTIC GLUCOSE BY METER IP

## 2018-01-12 PROCEDURE — 25000128 H RX IP 250 OP 636: Performed by: NURSE ANESTHETIST, CERTIFIED REGISTERED

## 2018-01-12 PROCEDURE — 25000125 ZZHC RX 250: Performed by: INTERNAL MEDICINE

## 2018-01-12 PROCEDURE — 87640 STAPH A DNA AMP PROBE: CPT | Performed by: INTERNAL MEDICINE

## 2018-01-12 PROCEDURE — 40000008 ZZH STATISTIC AIRWAY CARE

## 2018-01-12 PROCEDURE — 85027 COMPLETE CBC AUTOMATED: CPT | Performed by: HOSPITALIST

## 2018-01-12 PROCEDURE — 99222 1ST HOSP IP/OBS MODERATE 55: CPT | Performed by: NURSE PRACTITIONER

## 2018-01-12 PROCEDURE — 40000275 ZZH STATISTIC RCP TIME EA 10 MIN

## 2018-01-12 PROCEDURE — 20000003 ZZH R&B ICU

## 2018-01-12 RX ORDER — PROPOFOL 10 MG/ML
10-20 INJECTION, EMULSION INTRAVENOUS EVERY 30 MIN PRN
Status: DISCONTINUED | OUTPATIENT
Start: 2018-01-12 | End: 2018-01-14

## 2018-01-12 RX ORDER — NICOTINE POLACRILEX 4 MG
15-30 LOZENGE BUCCAL
Status: DISCONTINUED | OUTPATIENT
Start: 2018-01-12 | End: 2018-01-12

## 2018-01-12 RX ORDER — SODIUM CHLORIDE 9 MG/ML
INJECTION, SOLUTION INTRAVENOUS CONTINUOUS
Status: DISCONTINUED | OUTPATIENT
Start: 2018-01-12 | End: 2018-01-14

## 2018-01-12 RX ORDER — NALOXONE HYDROCHLORIDE 0.4 MG/ML
.1-.4 INJECTION, SOLUTION INTRAMUSCULAR; INTRAVENOUS; SUBCUTANEOUS
Status: DISCONTINUED | OUTPATIENT
Start: 2018-01-12 | End: 2018-01-13

## 2018-01-12 RX ORDER — PROPOFOL 10 MG/ML
INJECTION, EMULSION INTRAVENOUS
Status: COMPLETED
Start: 2018-01-12 | End: 2018-01-12

## 2018-01-12 RX ORDER — AMOXICILLIN 250 MG
1 CAPSULE ORAL 2 TIMES DAILY PRN
Status: DISCONTINUED | OUTPATIENT
Start: 2018-01-12 | End: 2018-01-15 | Stop reason: HOSPADM

## 2018-01-12 RX ORDER — CHLORHEXIDINE GLUCONATE ORAL RINSE 1.2 MG/ML
15 SOLUTION DENTAL EVERY 12 HOURS
Status: DISCONTINUED | OUTPATIENT
Start: 2018-01-12 | End: 2018-01-14

## 2018-01-12 RX ORDER — DOPAMINE HYDROCHLORIDE 160 MG/100ML
INJECTION, SOLUTION INTRAVENOUS
Status: COMPLETED
Start: 2018-01-12 | End: 2018-01-12

## 2018-01-12 RX ORDER — LORAZEPAM 2 MG/ML
1 INJECTION INTRAMUSCULAR ONCE
Status: DISCONTINUED | OUTPATIENT
Start: 2018-01-12 | End: 2018-01-15

## 2018-01-12 RX ORDER — DOPAMINE HYDROCHLORIDE 160 MG/100ML
2-20 INJECTION, SOLUTION INTRAVENOUS CONTINUOUS
Status: DISCONTINUED | OUTPATIENT
Start: 2018-01-12 | End: 2018-01-13

## 2018-01-12 RX ORDER — DEXTROSE MONOHYDRATE 25 G/50ML
25-50 INJECTION, SOLUTION INTRAVENOUS
Status: DISCONTINUED | OUTPATIENT
Start: 2018-01-12 | End: 2018-01-12

## 2018-01-12 RX ORDER — PROPOFOL 10 MG/ML
5-75 INJECTION, EMULSION INTRAVENOUS CONTINUOUS
Status: DISCONTINUED | OUTPATIENT
Start: 2018-01-12 | End: 2018-01-14

## 2018-01-12 RX ORDER — AMOXICILLIN 250 MG
2 CAPSULE ORAL 2 TIMES DAILY PRN
Status: DISCONTINUED | OUTPATIENT
Start: 2018-01-12 | End: 2018-01-15 | Stop reason: HOSPADM

## 2018-01-12 RX ORDER — NALOXONE HYDROCHLORIDE 0.4 MG/ML
.1-.4 INJECTION, SOLUTION INTRAMUSCULAR; INTRAVENOUS; SUBCUTANEOUS
Status: DISCONTINUED | OUTPATIENT
Start: 2018-01-12 | End: 2018-01-14

## 2018-01-12 RX ORDER — PROPOFOL 10 MG/ML
INJECTION, EMULSION INTRAVENOUS PRN
Status: DISCONTINUED | OUTPATIENT
Start: 2018-01-12 | End: 2018-01-12

## 2018-01-12 RX ADMIN — DOPAMINE HYDROCHLORIDE 400000 MCG: 160 INJECTION, SOLUTION INTRAVENOUS at 20:17

## 2018-01-12 RX ADMIN — MEROPENEM 500 MG: 500 INJECTION, POWDER, FOR SOLUTION INTRAVENOUS at 01:55

## 2018-01-12 RX ADMIN — PHENYLEPHRINE HYDROCHLORIDE 100 MCG: 10 INJECTION INTRAVENOUS at 17:15

## 2018-01-12 RX ADMIN — SODIUM CHLORIDE: 9 INJECTION, SOLUTION INTRAVENOUS at 09:22

## 2018-01-12 RX ADMIN — CHLORHEXIDINE GLUCONATE 15 ML: 1.2 RINSE ORAL at 21:02

## 2018-01-12 RX ADMIN — MEROPENEM 500 MG: 500 INJECTION, POWDER, FOR SOLUTION INTRAVENOUS at 09:05

## 2018-01-12 RX ADMIN — PROPOFOL 20 MCG/KG/MIN: 10 INJECTION, EMULSION INTRAVENOUS at 22:44

## 2018-01-12 RX ADMIN — FAMOTIDINE 20 MG: 10 INJECTION, SOLUTION INTRAVENOUS at 18:24

## 2018-01-12 RX ADMIN — PROPOFOL 20 MCG/KG/MIN: 10 INJECTION, EMULSION INTRAVENOUS at 17:29

## 2018-01-12 RX ADMIN — PROPOFOL 50 MG: 10 INJECTION, EMULSION INTRAVENOUS at 17:06

## 2018-01-12 RX ADMIN — PROPOFOL 50 MG: 10 INJECTION, EMULSION INTRAVENOUS at 17:03

## 2018-01-12 RX ADMIN — ENOXAPARIN SODIUM 40 MG: 40 INJECTION SUBCUTANEOUS at 21:02

## 2018-01-12 RX ADMIN — MEROPENEM 500 MG: 500 INJECTION, POWDER, FOR SOLUTION INTRAVENOUS at 21:02

## 2018-01-12 RX ADMIN — MEROPENEM 500 MG: 500 INJECTION, POWDER, FOR SOLUTION INTRAVENOUS at 14:18

## 2018-01-12 RX ADMIN — SODIUM CHLORIDE: 9 INJECTION, SOLUTION INTRAVENOUS at 19:04

## 2018-01-12 RX ADMIN — SODIUM CHLORIDE, POTASSIUM CHLORIDE, SODIUM LACTATE AND CALCIUM CHLORIDE 1000 ML: 600; 310; 30; 20 INJECTION, SOLUTION INTRAVENOUS at 18:58

## 2018-01-12 RX ADMIN — FENTANYL CITRATE 25 MCG/HR: 50 INJECTION, SOLUTION INTRAMUSCULAR; INTRAVENOUS at 17:30

## 2018-01-12 RX ADMIN — PROPOFOL 50 MG: 10 INJECTION, EMULSION INTRAVENOUS at 17:01

## 2018-01-12 RX ADMIN — SODIUM CHLORIDE 2200 MG PE: 9 INJECTION, SOLUTION INTRAVENOUS at 17:41

## 2018-01-12 NOTE — PLAN OF CARE
Problem: Patient Care Overview  Goal: Plan of Care/Patient Progress Review  Outcome: No Change  Pt only responsive to painful stimuli. VSS on 2L NC. Johnson patent with adequate output. Repositioned q2 in bed. Blood sugars stable, no interventions needed. Pt to go for MRI and EEG today. Will continue to monitor.

## 2018-01-12 NOTE — CONSULTS
[facility]    Neurology Consultation Note     Gem Jade MRN# [id number]   YOB: 1945 Age: 72 year old    Code Status:Full Code   Date of Admission: 1/11/2018  Date of Consult: 01/12/2018    _________________________________   Primary Care Physician   Marivel Barcenas, ALISON    Reason for consult: I was asked by Dr. Arellano to evaluate this patient for unresponsiveness       ______________________________________________         Assessment & Plan   ______________________________________________  #. (G93.40) Encephalopathy  --CT head negative for acute abnormality  --question of decreased mental status overnight, but in further evaluation no change since admission  --MRI brain to evaluate further  --will also get EEG to evaluate for possible subclinical status epilepticus   #. (T83.511A,  N39.0) Urinary tract infection associated with indwelling urethral catheter, initial encounter (H)  --management per hospitalist  #. DVT Prophylaxis  --per primary service  #. PT/OT/Speech  --Start evaluations  #. Nutrition / GI Prophylaxis  --Per recommendations of speech therapy      #. Code Status: Full Code        Chief Complaint   ______________________________________________  Altered mental status   History is obtained from the electronic health record    History of Present Illness   ______________________________________________  Gem Jade is a 72 year old female who presented with altered mental status, suspected to e metabolic encephalopathy. Patient recently discharged at the end of December due to UTI and sepsis. She has neurogenic bladder with chronic indwelling catheter. CT head negative for acute abnormality. Johnson catheter exchanged in the ED. Glucose initially 28, which was corrected with D50. Patient continued to have altered mentation. Neurology was consulted. RRT called overnight due to unresponsiveness, patient only responding to painful stimuli.    On exam,  patient largely unresponsive. Did moan to voice stimulus, but no verbalizations. Did verbalize when turned for bathing with nursing. Eyes fluttering. Initially squeezing hands lightly to command, but rhythmic squeezing and does not let go to command. Increased tone throughout. No change in mental status since admission. Pending MRI. Will add EEG to evaluate for subclinical status.    Past Medical History    ______________________________________________  Past Medical History:   Diagnosis Date     Background diabetic retinopathy(362.01)     extensive diabetic retinopathy, s/p multiple laser treatments     Diabetic gastroparesis (H)     followed by [facility] (Dr. 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)      Urinary tract infection, site not specified     Chronic UTIs     UTI (urinary tract infection) due to urinary indwelling catheter (H)      Past Surgical History   ______________________________________________  Past Surgical History:   Procedure Laterality Date     C NONSPECIFIC PROCEDURE  10/99    kidney transplant     C NONSPECIFIC PROCEDURE  8/00    MRI head, lumbar spine, pelvis     C TOTAL KNEE ARTHROPLASTY  3/03    Knee Replacement, Total right, post op PE     CHOLECYSTECTOMY       HC LEFT HEART CATHETERIZATION  1999    Cardiac Cath, Left Heart, Negative  (@ [facility])     HC LEFT HEART CATHETERIZATION  4/05    normal coronary angiogram at [facility], had mild troponin rise (0.71)     HC REMV CATARACT EXTRACAP,INSERT LENS  8/04    bilateral cataract  repaired, left eye      Prior to Admission Medications   ______________________________________________  Prior to Admission Medications   Prescriptions Last Dose Informant Patient Reported? Taking?   ACETAMINOPHEN PO  Other Yes Yes   Sig: Take 325-650 mg by mouth every 4 hours as needed.   Cranberry 400 MG TABS  Other Yes Yes   Sig: Take 400 mg by mouth 2 times daily   Lactobacillus Rhamnosus, GG, (CULTURELL) capsule  Other No Yes   Sig: Take 1 capsule by mouth 2 times daily   ORDER FOR DME  Other No No   Sig: Equipment being ordered: Johnson Catheters - 18 french   OXcarbazepine (TRILEPTAL) 300 MG tablet  Other Yes Yes   Sig: Take 1 tablet (300 mg) by mouth 2 times daily For pain   TraMADol HCl 50 MG TBDP  Other No Yes   Sig: Take 50 mg by mouth 3 times daily as needed   VITAMIN D, CHOLECALCIFEROL, PO  Other Yes Yes   Sig: Take 4,000 Units by mouth 2 times daily   aspirin 81 MG chewable tablet  Other No Yes   Sig: Take 1 tablet (81 mg) by mouth daily Starting 1 week after your kidney biopsy   baclofen (LIORESAL) 10 MG tablet  Other Yes Yes   Sig: Take 1 tablet (10 mg) by mouth 3 times daily as needed for muscle spasms   blood glucose monitoring (ACCU-CHEK COMPACT PLUS) test strip  Other Yes No   Sig: Use to test blood sugar 3 times daily or as directed.  Ok to substitute alternative if insurance prefers.   cloNIDine (CATAPRES) 0.1 MG tablet  Other No Yes   Sig: TAKE ONE TABLET BY MOUTH TWICE A DAY   cyanocobalamin (VITAMIN B12) 1000 MCG/ML injection  Other No Yes   Sig: Inject 1 mL (1,000 mcg) into the muscle every 30 days   diphenoxylate-atropine (LOMOTIL) 2.5-0.025 MG per tablet  Other No Yes   Sig: Take 1 tablet by mouth 4 times daily as needed for diarrhea   famotidine (PEPCID) 20 MG tablet  Other Yes Yes   Sig: Take 1 tablet (20 mg) by mouth 2 times daily   gabapentin (NEURONTIN) 300 MG capsule  Other No Yes   Sig: Take 3 capsules (900 mg) by mouth 2 times daily For pain   guaiFENesin (ROBITUSSIN) 100 MG/5ML  SYRP  Other No Yes   Sig: Take 10-20 mLs by mouth every 4 hours as needed for cough   insulin aspart (NOVOLOG FLEXPEN) 100 UNIT/ML injection  Other No Yes   Sig: Inject 10 units under the skin 2 times daily before meals as directed. Add sliding scale as needed. Avg use 25 units per day   Patient taking differently: Inject 10 units under the skin 2 times daily before meals as directed. Add sliding scale as needed.  2 units for every 50 over 201.  Avg use 25 units per day   insulin glargine (LANTUS SOLOSTAR) 100 UNIT/ML injection  Other No Yes   Sig: Inject 24 units under the skin daily.   Patient taking differently: Inject 24 Units Subcutaneous At Bedtime Inject 24 units under the skin daily.   insulin pen needle 30G X 8 MM  Other No No   Sig: Use as directed with Lantus   labetalol (NORMODYNE) 300 MG tablet  Other Yes Yes   Sig: Take 150 mg by mouth 2 times daily Take 1/2 tablet (150mg) =300mg. Hold for systolic BP less than 120.   levothyroxine (SYNTHROID/LEVOTHROID) 50 MCG tablet  Other No Yes   Sig: TAKE ONE TABLET BY MOUTH EVERY DAY   lidocaine (XYLOCAINE) 2 % topical gel  Other No Yes   Sig: Apply topically as needed for moderate pain 10 ML every 3 weeks   loratadine (CLARITIN) 10 MG tablet  Other No Yes   Sig: Take 1 tablet (10 mg) by mouth daily   meclizine (ANTIVERT) 12.5 MG tablet  Other No Yes   Sig: Take 1 tablet (12.5 mg) by mouth 3 times daily as needed for dizziness   ondansetron (ZOFRAN ODT) 4 MG disintegrating tablet  Other No Yes   Sig: Take 1-2 tablets (4-8 mg) by mouth every 8 hours as needed for nausea   order for DME  Other No No   Sig: Equipment being ordered: Blue Adrián  Please send to [facility]   order for DME  Other No No   Sig: Equipment being ordered: Everpay FX CPAP interface (nasal pillows), size XS.   order for DME  Other No No   Sig: Equipment being ordered: Silver Impregnated catheters  HX of frequent UTI   order for DME  Other No No   Sig: High back guerrero wheel chair  MICHELLE 99  DX:  morbid obesity, generalized weakness, physical deconditioning, chronic vertigo   pravastatin (PRAVACHOL) 10 MG tablet  Other No Yes   Sig: TAKE ONE TABLET BY MOUTH EVERY DAY   predniSONE (DELTASONE) 5 MG tablet  Other No Yes   Sig: Take 1 tablet (5 mg) by mouth daily   simethicone (MYLICON) 125 MG CHEW  Other Yes Yes   Sig: Take 1 tablet (125 mg) by mouth as needed for intestinal gas   tacrolimus (GENERIC EQUIVALENT) 1 MG capsule  Other No Yes   Sig: Take 2 capsules (2 mg) by mouth 2 times daily Total dose 2.5 mg twice daily      Facility-Administered Medications: None     Allergies   Allergies   Allergen Reactions     Bactrim Ds [Sulfamethoxazole W/Trimethoprim]      Creatinine level increased     Atorvastatin Calcium      myalgias, muscle aches     Codeine Nausea and Vomiting     Darvocet [Propoxyphene N-Apap] Nausea and Vomiting     Hydromorphone      Levofloxacin Other (See Comments) and Diarrhea     hallucinations     Morphine      Nausea and vomiting     Niaspan [Niacin] GI Disturbance     Flushing even at low dose, GI upset     Percocet [Oxycodone-Acetaminophen]      Vomiting after taking med couple of times     Vicodin [Hydrocodone-Acetaminophen] Nausea and Vomiting       Social History   ______________________________________________  Social History     Social History     Marital status:      Spouse name: N/A     Number of children: N/A     Years of education: N/A     Social History Main Topics     Smoking status: Former Smoker     Years: 17.00     Quit date: 6/1/1987     Smokeless tobacco: Not on file     Alcohol use No     Drug use: No     Sexual activity: Yes     Partners: Male     Other Topics Concern     Not on file     Social History Narrative       Family History   ______________________________________________  Family History   Problem Relation Age of Onset     DIABETES Mother      DIABETES Brother      Hypertension Father      [id number] pneumonia     HEART DISEASE Father        Review of  "Systems   ______________________________________________  Review of systems is limited by patient factors - mental status      Physical Exam   ______________________________________________  Weight:238 lbs 0 oz; Height:5' 6\"  Temp: 98.2  F (36.8  C) Temp src: Axillary BP: 163/89 Pulse: 68 Heart Rate: 74 Resp: 14 SpO2: 98 % O2 Device: Nasal cannula Oxygen Delivery: 2 LPM  General Appearance:  No acute distress  Neuro:       Mental Status Exam:   Obtunded, does not arouse to voice, unable to assess orientation. Minimal moaning. Mental status is decreased        Cranial Nerves:  Pupils 3 mm, equal. Cannot fully assess eyes due to patient holding eyes shut. Face is symmetric. Tongue and uvula are midline. Other CN are normal           Motor:  Minimal rhythmic squeeze on command bilaterally, no movement BLE. Increased tone throughout        Reflexes:  Normal DTR. Toes unresponsive.        Sensory:  Unable to assess             Coordination:   Unable to assess       Gait:  Unable to assess  Neck: no nuchal rigidity, normal thyroid. No carotid bruits.    Cardiovascular: Regular rate and rhythm, no m/r/g  Lungs: Clear to auscultation  Abdomen: Soft, not tender, not distended  Extremities: No clubbing, no cyanosis, no edema    Data   ______________________________________________  All Data personally reviewed:       Labs:   CBC RESULTS:     Recent Labs  Lab 01/12/18  0648 01/11/18  1502   WBC 5.4 6.1   RBC 3.89 3.92   HGB 11.7 11.9   HCT 35.2 35.5   * 166     Basic Metabolic Panel:   Recent Labs   Lab Test  01/12/18   0648  01/11/18   1502  12/28/17   1230   NA  132*  128*  138   POTASSIUM  4.3  4.9  4.1   CHLORIDE  100  95  104   CO2  26  27  26   BUN  20  24  23   CR  1.06*  1.20*  0.96   GLC  131*  119*  125*   ROGER  8.2*  8.5  8.0*     Liver panel:  Recent Labs   Lab Test  01/11/18   1502  12/19/17   1602  11/16/17   0525  11/15/17   0505  11/13/17   1613   PROTTOTAL  8.2  7.7  7.0  7.4  7.7   ALBUMIN  3.1*  3.0*  " 2.7*  2.8*  3.0*   BILITOTAL  0.3  0.4  0.3  0.3  0.2   ALKPHOS  86  83  64  70  67   AST  24  16  13  12  8   ALT  16  21  11  11  11     INR:  Recent Labs   Lab Test  11/06/17   1652  04/26/17   2338  04/26/17   0638  03/08/17   1243  09/02/16   0838   INR  1.09  1.03  1.06  1.01  1.09      Lipid Profile:  Recent Labs   Lab Test  12/05/16   1210  02/05/15   1250  05/16/13   1240  06/16/11   1230  11/07/10   1250   CHOL  164  106  244*  179  195   HDL  46*  43*  42*  50  57   LDL  64  18  149*  83  93   TRIG  272*  223*  262*  226*  221*   CHOLHDLRATIO   --   2.5  6.0*  3.6  3.4     A1C:   Recent Labs   Lab Test  12/19/17   1602  11/14/17   0455  07/13/17   1105  12/05/16   1210  06/13/16   1140   A1C  6.7*  7.7*  5.6  7.4*  6.7*     Troponin I:   Recent Labs   Lab Test  11/13/17   1613  04/19/17   0005  01/10/16   1408  12/18/15   1315  08/24/15   0030  03/18/14   0043  05/19/13   0625  04/11/11   1455   TROPI  <0.015  <0.015  The 99th percentile for upper reference range is 0.045 ug/L.  Troponin values in   the range of 0.045 - 0.120 ug/L may be associated with risks of adverse   clinical events.    <0.015  The 99th percentile for upper reference range is 0.045 ug/L.  Troponin values in   the range of 0.045 - 0.120 ug/L may be associated with risks of adverse   clinical events.    <0.015  The 99th percentile for upper reference range is 0.045 ug/L.  Troponin values in   the range of 0.045 - 0.120 ug/L may be associated with risks of adverse   clinical events.    <0.015  The 99th percentile for upper reference range is 0.045 ug/L.  Troponin values in   the range of 0.045 - 0.120 ug/L may be associated with risks of adverse   clinical events.    0.012  <0.012  <0.012     Ammonia:   Recent Labs   Lab Test  12/20/17   1420  04/19/17   0005  03/18/14   1138   NOREEN  17  25  15     CK:   Recent Labs   Lab Test  03/09/17   1310  08/07/12   0647   CKT  36  <20*        CRP inflammation:   Recent Labs   Lab Test   03/08/17   1243  08/31/16   0801  05/25/16   2116  02/18/16   1436  04/28/15   1650   CRP  7.5  39.0*  <2.9  8.8*  145.0*     ESR:   Recent Labs   Lab Test  05/25/16   2116   SED  53*       Drug Screen: Recent Labs   Lab Test  12/19/17   1710  11/13/17   1846   UAMP  Negative  Negative   UBARB  Negative  Negative   BENZODIAZEUR  Negative  Negative   UCANN  Negative  Negative   UCOC  Negative  Negative   OPIT  Negative  Negative   UPCP  Negative  Negative     UA Results:  Recent Labs   Lab Test  01/11/18   1614   COLOR  Yellow   APPEARANCE  Turbid   URINEGLC  100*   URINEBILI  Negative   URINEKETONE  Negative   SG  1.025   UBLD  Large*   URINEPH  6.0   PROTEIN  >=300*   UROBILINOGEN  0.2   NITRITE  Positive*   LEUKEST  Large*   RBCU  5-10*   WBCU  >100*     Most Recent 6 Bacteria Isolates From Any Culture (See EPIC Reports for Culture Details):  Recent Labs   Lab Test  01/11/18   1614  01/11/18   1530  12/19/17   2233  12/19/17   1900  12/19/17   1620  12/19/17   1619   CULT  No growth after 10 hours  PENDING  No growth after 11 hours  No MRSA isolated  No growth after 24 days  No growth  No growth        Cardiac US:   --       Neurophysiology:   --       Imaging:   All imaging studies were reviewed personally  CT head 1/11/18:   -No acute intracranial hemorrhage, midline shift or acute infarct. Stable-appearing atrophy changes. Small fluid at the bilateral maxillary sinuses, along with ethmoid sinus mucosal thickening.      Text Page    Pam Vega, MSN, FNP-BC, RN CNRN

## 2018-01-12 NOTE — ED NOTES
called and informed of admission.  asked to get back blue bed sheet. Tech given belongings bag to place sheet that is under pt when moving beds on the floor.

## 2018-01-12 NOTE — CODE/RAPID RESPONSE
"[facility]    RRT Note  1/11/2018   Time Called:2104    RRT called for: unresponsive    Assessment & Plan   IMPRESSION & PLAN:  Encephalopathy, nonverbal, no following commands, w/d bilat upper extremities and states \"ow\" to painful peripheral nailbed pressure.  PERRL 3mm brisk bilat, no LE movement    INTERVENTIONS:  Gluc 117, VS WNL  Exam consistent w/ exam obtained by admitted MD several hours prior as per his note, had him come to bedside to reconfirm, no significant change  No sedative meds received in ED on MAR review  Checking VBG to r/o hypercarbia  Lactic WNL so lower suspicion for seizure activity  Pt to remain on current unit    Discussed with Dr. Haas (admitting MD) & Dr. Jasmyn musa MD and defer further cares to Dr. Haas for the remainder of his shift, thereafter to the cross covering provider    Interval History     Gem Jade is a 72 year old female who was admitted on 1/11/2018 for Acute metabolic encephalopathy, suspected secondary to hypoglycemia and acute on chronic urinary tract infection, with multiple drug-resistant organisms in the past, hyponatremia, ARF hypoxic tupe, CKD3-4 s/p remote renal tx on immunosuppressive therapy     Medical history significant for: hyperparathyroidism, hypertension, hyperlipidemia, Diabetes mellitus type 2, with a history of gastroparesis, retinopathy, polyneuropathy, Anemia of chronic disease, baseline 10-12, Physical deconditioning, functional impairment, secondary to physical debility, polyneuropathy, and history of Charcot foot, History of bilateral pulmonary emboli, postop after knee replacement in the past:Hypothyroidism, GERD      Code Status: Full Code    Brittani Hou    Allergies   Allergies   Allergen Reactions     Bactrim Ds [Sulfamethoxazole W/Trimethoprim]      Creatinine level increased     Atorvastatin Calcium      myalgias, muscle aches     Codeine Nausea and Vomiting     Darvocet [Propoxyphene N-Apap] Nausea and " Vomiting     Hydromorphone      Levofloxacin Other (See Comments) and Diarrhea     hallucinations     Morphine      Nausea and vomiting     Niaspan [Niacin] GI Disturbance     Flushing even at low dose, GI upset     Percocet [Oxycodone-Acetaminophen]      Vomiting after taking med couple of times     Vicodin [Hydrocodone-Acetaminophen] Nausea and Vomiting       Physical Exam   Vital Signs with Ranges:  Temp:  [96.8  F (36  C)-97.4  F (36.3  C)] 97.4  F (36.3  C)  Heart Rate:  [62-76] 76  Resp:  [10-18] 11  BP: (143-171)/() 152/81  SpO2:  [84 %-100 %] 98 %       Constitutional: elderly woman lying in bed, minimally responsive  ENT: PERRL 3mm brisk bilat   Neck: initially a little stiff but then moves freely chin to chest w/ repeated testing  Pulmonary: RR 10, 100% SpO2 on 2L NC  Cardiovascular: NSR on tele, HTN w/ SBP 150s  GI: obese  Skin/Integumen: no mottling,   Neuro: as above  Psych:  defer  Extremities: charcot foot deformity bilat, L knee scar    Data     Telemetry:  NSR  ABG:  -No lab results found in last 7 days.      IMAGING: (X-ray/CT/MRI)   Recent Results (from the past 24 hour(s))   XR Chest 2 Views    Narrative    CHEST TWO VIEWS 1/11/2018 4:07 PM     HISTORY: Cough and altered mental status    COMPARISON: 12/19/2017     FINDINGS: There are no acute infiltrates. The cardiac silhouette is  not enlarged. Pulmonary vasculature is unremarkable.      Impression    IMPRESSION: No acute disease.    HERB REDDY MD   Head CT w/o contrast    Narrative    CT SCAN OF THE HEAD WITHOUT CONTRAST   1/11/2018 4:42 PM     HISTORY: Altered mental status.    TECHNIQUE: 5 mm thick axial images of the head without IV contrast  material. Radiation dose for this scan was reduced using automated  exposure control, adjustment of the mA and/or kV according to patient  size, or iterative reconstruction technique.    COMPARISON: 12/19/2017.    FINDINGS: There is no evidence of intracranial hemorrhage, midline  shift, or  acute infarct. Bilateral atrophy changes noted of the brain  parenchyma. Small fluid at the bilateral maxillary sinuses and some  ethmoid sinus mucosal thickening. Mastoids appear clear. There is no  evidence for fracture.      Impression    IMPRESSION: No acute intracranial hemorrhage, midline shift or acute  infarct. Stable-appearing atrophy changes. Small fluid at the  bilateral maxillary sinuses, along with ethmoid sinus mucosal  thickening.       CBC with Diff:  Recent Labs   Lab Test  01/11/18   1502   11/06/17   1652   WBC  6.1   < >  13.5*   HGB  11.9   < >  12.7   MCV  91   < >  96   PLT  166   < >  171   INR   --    --   1.09    < > = values in this interval not displayed.          Lactic Acid:    Lab Results   Component Value Date    LACT 0.7 01/11/2018           Comprehensive Metabolic Panel:    Recent Labs  Lab 01/11/18  1502   *   POTASSIUM 4.9   CHLORIDE 95   CO2 27   ANIONGAP 6   *   BUN 24   CR 1.20*   GFRESTIMATED 44*   GFRESTBLACK 54*   ROGER 8.5   PROTTOTAL 8.2   ALBUMIN 3.1*   BILITOTAL 0.3   ALKPHOS 86   AST 24   ALT 16         UA:    Recent Labs  Lab 01/11/18  1614   COLOR Yellow   APPEARANCE Turbid   URINEGLC 100*   URINEBILI Negative   URINEKETONE Negative   SG 1.025   UBLD Large*   URINEPH 6.0   PROTEIN >=300*   UROBILINOGEN 0.2   NITRITE Positive*   LEUKEST Large*   RBCU 5-10*   WBCU >100*       Time Spent on this Encounter   I spent 25 minutes on the unit/floor managing the care of Gem Jade. Over 50% of my time was spent counseling the patient and/or coordinating care regarding services listed in this note.

## 2018-01-12 NOTE — ED NOTES
Pt returned to ED. Floor RN Nishi stated that room was not clean. Room was marked ready and clean at 1730. RN then stated that the RN taking the pt was on break and wouldn't be ready to take the pt right now because of shift change. Charge RN notified.

## 2018-01-12 NOTE — PROGRESS NOTES
[facility]    Hospitalist Progress Note    Assessment & Plan   Gem Jade is a 72 year old female who presented with AMS of unknown etiology.     Acute metabolic encephalopathy, suspected secondary to acute on chronic urinary tract infection, with multiple drug-resistant organisms in the past  Patient was recently discharged on December 25, 2017 due to sepsis and urinary tract infection at that time.  The patient was on vancomycin and meropenem during that admission initially, and then subsequently consolidated to meropenem during her inpatient stay.  She has neurogenic bladder, requiring a chronic indwelling Johnson catheter and a history of Klebsiella, ESBL, VRE, Citrobacter freundii, and candiduria.  Infectious disease has contemplated her clinical scenario and decided against chronic suppressive antibiotic therapy, reportedly, due to the risk of acquisition of Clostridium difficile infection. Initially there was concern for hypoglycemia as the cause but her blood sugars have been good overnight and she continues to essentially be unresponsive.   Urinalysis revealed 100 of glucose, over 300 protein, positive nitrite, large blood, large leukocyte Estrace, greater than 100 white blood cells, 5-10 red blood cells, many bacteria, and white blood cell clumps present, from a catheterized urine specimen.  UC has grown E coli and sensitivities are pending.    Influenza testing is negative for influenza A or B.  A CT of the head was completed showing no acute intracranial hemorrhage, midline shift, or acute infarct, stable-appearing atrophy changes, small fluid at the bilateral maxillary sinuses, along with ethmoid sinus mucosal thickening, per report.  Lactic acid was also normal at 0.7 on admission.  - Blood cultures pending   - Continue Meropenem  - Holding PTA Baclofen, Gabapentin, Trileptal and Tramadol to avoid medications that cause AMS  - Infectious disease consulted and appreciate their  recommendations   - Oxcarbazepine level pending  - Neurology consulted and appreciate their recommendations  - MRI pending and EEG after     Addendum:  Patient had EEG and appears to be in status epilepticus.  Will transfer to the ICU and will consult the intensivists     Hyponatremia  Patient with a sodium level of 128 on admission, up to 132 with fluids  - Change fluids to NS     Acute hypoxic respiratory failure, secondary to altered mentation  Patient is not requiring oxygen at baseline according to note review.  The patient did have a chest x-ray on admission showing no acute infiltrates, and report of no acute disease.  - Pulmonary hygiene.  - Wean oxygen as able.  - Respiratory cares.     Chronic renal failure, stage 3-4, status post renal transplant in 1999, with chronic immunosuppression, as well as hyperparathyroidism  Initial creatinine is 1.20 on admission.  The patient has been followed by nephrology in the past.  She is maintained on prednisone and tacrolimus.  Her last tacrolimus level was on January 2, 2018 and returned at 3.0.  - Continue tacrolimus and steroids.  - Nephrology consulted.     Cardiac, hypertension, hyperlipidemia  Stable.  - Monitor.  - Continue prior to admission aspirin 81 mg daily as able  - Continue prior to admission clonidine as able  - Continue prior to admission labetalol as able  - Continue prior to admission statin as able     Diabetes mellitus type 2, with a history of gastroparesis, retinopathy, polyneuropathy  Patient with previous hemoglobin A1c is 6.7 on December 19, 2017.  Patient was noted to have a low blood sugar by EMS upon initial evaluation according to documentation in the chart review. Since admission blood sugars have been well controlled  - Close blood sugar monitoring  - Insulin sliding scale  - Hold long-acting insulin at this time     Anemia of chronic disease, baseline 10-12  Patient with no obvious sign of blood loss on admission.  Hemoglobin on  admission is normal range 11.9  - Monitor     Physical deconditioning, functional impairment, secondary to physical debility, polyneuropathy, and history of Charcot foot: Patient with home cares, per report.  - Monitor  - Continue therapy evaluations and treatment     History of bilateral pulmonary emboli, postop after knee replacement in the past  This is elucidated upon chart review, listed in past medical history.  - Monitor     Hypothyroidism  Patient maintained levothyroxine prior to admission.  Last TSH was obtained on December 19, 2017 and returning 1.34.  - Continue prior to admission levothyroxine    DVT Prophylaxis: Enoxaparin (Lovenox) SQ  Code Status: Full Code    Disposition: Expected discharge TBD.  Patient still altered and unable to answer questions.      Victoriano Arellano, DO  Text Page (7am to 6pm)     Interval History   Patient seen and examined.  She is unresponsive to verbal stimuli and only withdraws to pain.  Nursing reports this has been stable since admission.    -Data reviewed today: I reviewed all new labs and imaging results over the last 24 hours. I personally reviewed no images or EKG's today.    Physical Exam   Temp: 98.2  F (36.8  C) Temp src: Axillary BP: 163/89 Pulse: 68 Heart Rate: 74 Resp: 14 SpO2: 98 % O2 Device: Nasal cannula Oxygen Delivery: 2 LPM  Vitals:    01/11/18 1436   Weight: 108 kg (238 lb)     Vital Signs with Ranges  Temp:  [96.8  F (36  C)-99.2  F (37.3  C)] 98.2  F (36.8  C)  Pulse:  [68-70] 68  Heart Rate:  [62-76] 74  Resp:  [10-18] 14  BP: (143-171)/() 163/89  SpO2:  [84 %-100 %] 98 %  I/O last 3 completed shifts:  In: 43.33 [I.V.:43.33]  Out: 750 [Urine:750]    Constitutional: Will withdraw to pain but otherwise unresponsive  Respiratory: Clear to auscultation bilaterally, no crackles or wheezing  Cardiovascular: Regular rate and rhythm, normal S1 and S2, and no murmur noted  GI: Normal bowel sounds, soft  Skin/Integumen: No rashes, no  cyanosis    Medications     NaCl 100 mL/hr at 01/12/18 0922       aspirin  81 mg Oral Daily     cloNIDine  0.1 mg Oral BID     famotidine  20 mg Oral BID     labetalol  150 mg Oral BID     lactobacillus rhamnosus (GG)  1 capsule Oral BID     levothyroxine  50 mcg Oral Daily     pravastatin  10 mg Oral Daily     predniSONE  5 mg Oral Daily     tacrolimus  2 mg Oral BID     cholecalciferol  4,000 Units Oral BID     sodium chloride (PF)  3 mL Intracatheter Q8H     enoxaparin  40 mg Subcutaneous Q24H     meropenem  500 mg Intravenous Q6H     insulin aspart  1-6 Units Subcutaneous Q4H       Data     Recent Labs  Lab 01/12/18  0648 01/11/18  1502 01/11/18  1331   WBC 5.4 6.1  --    HGB 11.7 11.9  --    MCV 91 91  --    * 166  --    * 128* 132*   POTASSIUM 4.3 4.9 4.9   CHLORIDE 100 95 100   CO2 26 27 18*   BUN 20 24 27   CR 1.06* 1.20* 1.16*   ANIONGAP 6 6 14   ROGER 8.2* 8.5 8.3*   * 119* 23*   ALBUMIN  --  3.1*  --    PROTTOTAL  --  8.2  --    BILITOTAL  --  0.3  --    ALKPHOS  --  86  --    ALT  --  16  --    AST  --  24  --        Imaging:   Recent Results (from the past 24 hour(s))   XR Chest 2 Views    Narrative    CHEST TWO VIEWS 1/11/2018 4:07 PM     HISTORY: Cough and altered mental status    COMPARISON: 12/19/2017     FINDINGS: There are no acute infiltrates. The cardiac silhouette is  not enlarged. Pulmonary vasculature is unremarkable.      Impression    IMPRESSION: No acute disease.    HERB REDDY MD   Head CT w/o contrast    Narrative    CT SCAN OF THE HEAD WITHOUT CONTRAST   1/11/2018 4:42 PM     HISTORY: Altered mental status.    TECHNIQUE: 5 mm thick axial images of the head without IV contrast  material. Radiation dose for this scan was reduced using automated  exposure control, adjustment of the mA and/or kV according to patient  size, or iterative reconstruction technique.    COMPARISON: 12/19/2017.    FINDINGS: There is no evidence of intracranial hemorrhage, midline  shift, or  acute infarct. Bilateral atrophy changes noted of the brain  parenchyma. Small fluid at the bilateral maxillary sinuses and some  ethmoid sinus mucosal thickening. Mastoids appear clear. There is no  evidence for fracture.      Impression    IMPRESSION: No acute intracranial hemorrhage, midline shift or acute  infarct. Stable-appearing atrophy changes. Small fluid at the  bilateral maxillary sinuses, along with ethmoid sinus mucosal  thickening.    GISSEL HARDIN MD

## 2018-01-12 NOTE — PLAN OF CARE
Problem: Patient Care Overview  Goal: Plan of Care/Patient Progress Review  Outcome: No Change  Pt non-responsive except to painful stimuli. Chronic rico in place and patent. PIV inf. IV abx. Weight shift Ax2 q2. VSS on 2L O2. BGM. Will cont to monitor.

## 2018-01-12 NOTE — PLAN OF CARE
Problem: Patient Care Overview  Goal: Plan of Care/Patient Progress Review  OT:Orders received and chart reviewed. Attempted to see for evaluation, pt. having EEG. Will reschedule.

## 2018-01-12 NOTE — TELEPHONE ENCOUNTER
DATE:  1/12/2018    TIME OF RECEIPT FROM LAB:  1155 am  LAB TEST:  Glucose   LAB VALUE:  23   RESULTS GIVEN WITH READ-BACK TO (PROVIDER):  LASHAWN HERNANDEZ LPN  TIME LAB VALUE REPORTED TO PROVIDER:   Lisha Pimentel RN 12:00 pm    
09-Jul-2017 00:14

## 2018-01-12 NOTE — PROGRESS NOTES
[facility]  Patient is currently open to home care services with [facility]. The patient is currently receiving Skilled Nursing/PT and HHA services. Critical access hospital  and team have been notified of patient admission. Critical access hospital liaison will continue to follow patient during stay. If appropriate provide orders to resume home care at time of discharge.

## 2018-01-12 NOTE — PLAN OF CARE
Problem: Patient Care Overview  Goal: Plan of Care/Patient Progress Review  PT and OT: Orders received, per chart review and discussion with RN, pt only responding to painful stimuli at this time.  Will hold therapy evaluations this date.

## 2018-01-12 NOTE — PROGRESS NOTES
EEG CLINICAL NEUROPHYSIOLOGY PRELIMINARY REPORT    Portable EEG and first hour of long term monitoring reviewed. Moderate to severe diffuse encephalopathy. Triphasic waves occur in brief runs and appear less persistent when patient is stimulated. These are very likely a manifestation of the patient's encephalopathy and suggest a metabolic etiology. Seizures not recorded thus far. Will continue video-EEG monitoring. Full report to follow.    Trey Avina MD  Pager [phone]

## 2018-01-12 NOTE — CONSULTS
IDENTIFICATION:  Gem Jade is a 72-year-old female admitted through the emergency room on 01/11/2018 in the setting of altered mental status.      REASON FOR CONSULTATION:  Evaluation and assessment in the setting of known end-stage renal disease, status post previous renal transplant and modest electrolyte abnormalities.      IMPRESSION:   1.  End-stage renal disease.   2.  Status post living donor kidney transplant with baseline creatinine typically in the range of 1.0 mg/dL.  Recent allograft biopsy in 04/2017 demonstrating focal segmental glomerular sclerosis in addition to tubular interstitial scarring.   3.  Immunocompromised host on a combination of prednisone and tacrolimus.   4.  Modest acute kidney injury on presentation with a creatinine of 1.2 which has corrected to a value of 1.06 at or near her baseline.   5.  Modest hyponatremia on presentation with a sodium of 128 which has corrected to 132 with saline infusion.  That is an appropriate rate of correction overnight and the saline can be continued.   6.  Metabolic encephalopathy thought secondary to a combination of hypoglycemia and acute on chronic urinary tract infection.   7.  Diabetes complicated by gastroparesis, retinopathy and polyneuropathy.   8.  Anemia.   9.  Hypothyroidism.   10.  Status post rapid response late yesterday evening secondary to being unresponsive.      DISCUSSION:  Elderly female with a number of comorbid medical problems who, from a renal standpoint, has a functional renal transplant with a baseline creatinine in the range of approximately 1.  She does have evidence of chronic kidney disease on the basis of her allograft biopsy.  Her immunosuppressives should be continued at present doses.  Saline can be continued for hydration given her inability to take p.o. fluid.  We will document an a.m., tacrolimus level.      PLAN:   1.  Continue saline.   2.  Document tacrolimus level.   3.  Continue steroids.    4.  Antimicrobial  therapy as indicated.   5.  We will continue to follow with you.      HISTORY:  The patient admitted through the emergency room.  That information was reviewed in detail.  No history is directly available from the patient.  She apparently presented with altered mental status.  EMS was called for decreased interaction from baseline.  On presentation, she was modestly hypertensive, not tachycardic.  Her sats were adequate.  Laboratory data was obtained, concern for urinary tract infection was noted and she was admitted.      Her renal history is as delineated above.  Her creatinine is typically in the range of 1.  She has scarring in both glomerular and tubular interstitial and allograft biopsy.  She continues on immunosuppressive medications.  Review of her hospital course to date does demonstrate her to be nonoliguric.  Her renal function and electrolyte studies have normalized with modest saline hydration.      PAST MEDICAL HISTORY:   1.  End-stage renal disease.   2.  Status post living donor kidney transplant.   3.  Diabetes complicated by retinopathy, nephropathy and gastroparesis.   4.  Obesity.   5.  Osteopenia.   6.  Hyperlipidemia.   7.  History of thromboembolic disease.   8.  Chronic indwelling Johnson with recurrent urinary tract infection.   9.  Previous knee replacement.   10.  History of cholecystectomy.   11.  Previous left heart catheterization.      ALLERGIES:  Numerous and reviewed in the medical record includin.  Bactrim.   2.  Atorvastatin.   3.  Codeine.   4.  Hydromorphone.   5.  Levofloxacin.    6.  Morphine.   7.  Niaspan.     8.  Percocet.     9.  Vicodin.      HOME MEDICATIONS:  Reviewed in detail.      FAMILY AND SOCIAL HISTORY:  Reviewed in the medical record.      REVIEW OF SYSTEMS:  Unable to be obtained directly from the patient given her mental status.      PHYSICAL EXAMINATION:   VITAL SIGNS:  Afebrile, rhythm sinus, rate 70, blood pressure 150-160/70, 2 liters nasal cannula,  returning 98% oxygen saturations.   GENERAL:  Elderly unresponsive female lying in bed with nasal cannula oxygen.  She appears to withdraw to pain stimuli but does not answer questions or move in a purposeful manner.   HEENT:  Normocephalic, atraumatic.  Pupils equal, round, reactive to light and accommodation.  Pharynx is slightly dry.   CHEST:  Symmetric and clear.   CARDIOVASCULAR:  Regular.   ABDOMEN:  Obese.   GENITOURINARY:  Indwelling catheter.   EXTREMITIES:  Without cyanosis, clubbing or significant lower extremity edema.   NEUROLOGIC:  Unable.    PSYCHIATRIC:  Unable.        LABORATORY DATA:  Current and historic reviewed in detail.         SANCHEZ DE LA CRUZ MD             D: 2018 10:52   T: 2018 11:19   MT: [id number]      Name:     MARYJANE SHAVER   MRN:      [id number]        Account:       [id number]   :      1945           Consult Date:  2018      Document: [id number]

## 2018-01-12 NOTE — PLAN OF CARE
Problem: Patient Care Overview  Goal: Plan of Care/Patient Progress Review  SLP: Order received for swallow evaluation. Consulted with RN who reports patient not appropriate for evaluation this date due to non-responsiveness. Will reschedule for 1- pending patient medical status.

## 2018-01-12 NOTE — CONSULTS
[facility]    Infectious Disease Consultation     Date of Admission:  1/11/2018  Date of Consult (When I saw the patient): 01/12/18    Assessment & Plan   Gem Jade is a 72 year old female who was admitted on 1/11/2018.     Impression:  1.  A 72-year-old female well known to us from prior admissions, admitted with mental status changes, UTI being suspected but patient afebrile, normal wbc.   2.  History of Recurrent urinary tract infections, chronic Johnson catheter in place, has had vancomycin-resistant enterococcus and extended spectrum beta lactamase in the past.   3.  Diabetes mellitus. Her blood glucose level per EMS was 28.   4.  Prior renal transplant, on chronic immunosuppressant therapy without major immunosuppressing infections, renal function slightly abnormal, but stable.   5.  Sulfa allergy and listed quinolone allergy, quinolones were challenged in past successfully. Currently on Meropenem.  6. History of C diff.          RECOMMENDATIONS:   1. Procalcitonin is essentially negative. No fever, no leucocytosis.   2. Multiple antibiotics for possible UTI` s in past might have been a cause of MDR and C diff and should be avoided.   3. Other non infectious causes for presentation should be looked into.   4. Consider stopping meropenem.         Paramjit Mckenzie MD    Reason for Consult   Reason for consult: I was asked by Dr. Haas  to evaluate this patient for AMS .    Primary Care Physician   Marivel Barcenas NP    Chief Complaint   Altered     History is obtained from the patient and medical records    History of Present Illness   Gem Jade is a 72 year old female admitted with altered mental status. Multiple such admissions in past with UTI suspected as an underlying cause.  Urinalysis revealed 100 of glucose, over 300 protein, positive nitrite, large blood, large leukocyte Estrace, greater than 100 white blood cells, 5-10 red blood cells, many bacteria, and white blood  cell clumps present, from a catheterized urine specimen.  Influenza testing is negative for influenza A or B.  A CT of the head was completed showing no acute intracranial hemorrhage, midline shift, or acute infarct, stable-appearing atrophy changes, small fluid at the bilateral maxillary sinuses, along with ethmoid sinus mucosal thickening, per report.  Again, EMS reported that the patient had a blood glucose of 28, and was given D50 in route to the emergency department.  T  The patien tdoes not provide any history   Past Medical History   I have reviewed this patient's medical history and updated it with pertinent information if needed.   Past Medical History:   Diagnosis Date     Background diabetic retinopathy(362.01)     extensive diabetic retinopathy, s/p multiple laser treatments     Diabetic gastroparesis (H)     followed by [facility] (Dr. 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  Past Surgical History:   Procedure Laterality Date     C NONSPECIFIC PROCEDURE  10/99    kidney transplant     C NONSPECIFIC PROCEDURE  8/00    MRI head, lumbar spine, pelvis     C TOTAL KNEE ARTHROPLASTY  3/03    Knee Replacement, Total right, post op PE     CHOLECYSTECTOMY       HC LEFT HEART  CATHETERIZATION  1999    Cardiac Cath, Left Heart, Negative  (@ [facility])     HC LEFT HEART CATHETERIZATION  4/05    normal coronary angiogram at [facility], had mild troponin rise (0.71)     HC REMV CATARACT EXTRACAP,INSERT LENS  8/04    bilateral cataract repaired, left eye        Prior to Admission Medications   Prior to Admission Medications   Prescriptions Last Dose Informant Patient Reported? Taking?   ACETAMINOPHEN PO  Other Yes Yes   Sig: Take 325-650 mg by mouth every 4 hours as needed.   Cranberry 400 MG TABS  Other Yes Yes   Sig: Take 400 mg by mouth 2 times daily   Lactobacillus Rhamnosus, GG, (CULTURELL) capsule  Other No Yes   Sig: Take 1 capsule by mouth 2 times daily   ORDER FOR DME  Other No No   Sig: Equipment being ordered: Johnson Catheters - 18 french   OXcarbazepine (TRILEPTAL) 300 MG tablet  Other Yes Yes   Sig: Take 1 tablet (300 mg) by mouth 2 times daily For pain   TraMADol HCl 50 MG TBDP  Other No Yes   Sig: Take 50 mg by mouth 3 times daily as needed   VITAMIN D, CHOLECALCIFEROL, PO  Other Yes Yes   Sig: Take 4,000 Units by mouth 2 times daily   aspirin 81 MG chewable tablet  Other No Yes   Sig: Take 1 tablet (81 mg) by mouth daily Starting 1 week after your kidney biopsy   baclofen (LIORESAL) 10 MG tablet  Other Yes Yes   Sig: Take 1 tablet (10 mg) by mouth 3 times daily as needed for muscle spasms   blood glucose monitoring (ACCU-CHEK COMPACT PLUS) test strip  Other Yes No   Sig: Use to test blood sugar 3 times daily or as directed.  Ok to substitute alternative if insurance prefers.   cloNIDine (CATAPRES) 0.1 MG tablet  Other No Yes   Sig: TAKE ONE TABLET BY MOUTH TWICE A DAY   cyanocobalamin (VITAMIN B12) 1000 MCG/ML injection  Other No Yes   Sig: Inject 1 mL (1,000 mcg) into the muscle every 30 days   diphenoxylate-atropine (LOMOTIL) 2.5-0.025 MG per tablet  Other No Yes   Sig: Take 1 tablet by mouth 4 times daily as needed for diarrhea   famotidine (PEPCID) 20 MG tablet  Other Yes Yes   Sig:  Take 1 tablet (20 mg) by mouth 2 times daily   gabapentin (NEURONTIN) 300 MG capsule  Other No Yes   Sig: Take 3 capsules (900 mg) by mouth 2 times daily For pain   guaiFENesin (ROBITUSSIN) 100 MG/5ML SYRP  Other No Yes   Sig: Take 10-20 mLs by mouth every 4 hours as needed for cough   insulin aspart (NOVOLOG FLEXPEN) 100 UNIT/ML injection  Other No Yes   Sig: Inject 10 units under the skin 2 times daily before meals as directed. Add sliding scale as needed. Avg use 25 units per day   Patient taking differently: Inject 10 units under the skin 2 times daily before meals as directed. Add sliding scale as needed.  2 units for every 50 over 201.  Avg use 25 units per day   insulin glargine (LANTUS SOLOSTAR) 100 UNIT/ML injection  Other No Yes   Sig: Inject 24 units under the skin daily.   Patient taking differently: Inject 24 Units Subcutaneous At Bedtime Inject 24 units under the skin daily.   insulin pen needle 30G X 8 MM  Other No No   Sig: Use as directed with Lantus   labetalol (NORMODYNE) 300 MG tablet  Other Yes Yes   Sig: Take 150 mg by mouth 2 times daily Take 1/2 tablet (150mg) =300mg. Hold for systolic BP less than 120.   levothyroxine (SYNTHROID/LEVOTHROID) 50 MCG tablet  Other No Yes   Sig: TAKE ONE TABLET BY MOUTH EVERY DAY   lidocaine (XYLOCAINE) 2 % topical gel  Other No Yes   Sig: Apply topically as needed for moderate pain 10 ML every 3 weeks   loratadine (CLARITIN) 10 MG tablet  Other No Yes   Sig: Take 1 tablet (10 mg) by mouth daily   meclizine (ANTIVERT) 12.5 MG tablet  Other No Yes   Sig: Take 1 tablet (12.5 mg) by mouth 3 times daily as needed for dizziness   ondansetron (ZOFRAN ODT) 4 MG disintegrating tablet  Other No Yes   Sig: Take 1-2 tablets (4-8 mg) by mouth every 8 hours as needed for nausea   order for DME  Other No No   Sig: Equipment being ordered: Blue Chux  Please send to [facility] medical   order for DME  Other No No   Sig: Equipment being ordered: TechnoVax CPAP interface (nasal  pillows), size XS.   order for DME  Other No No   Sig: Equipment being ordered: Silver Impregnated catheters  HX of frequent UTI   order for DME  Other No No   Sig: High back guerrero wheel chair  MICHELLE 99  DX: morbid obesity, generalized weakness, physical deconditioning, chronic vertigo   pravastatin (PRAVACHOL) 10 MG tablet  Other No Yes   Sig: TAKE ONE TABLET BY MOUTH EVERY DAY   predniSONE (DELTASONE) 5 MG tablet  Other No Yes   Sig: Take 1 tablet (5 mg) by mouth daily   simethicone (MYLICON) 125 MG CHEW  Other Yes Yes   Sig: Take 1 tablet (125 mg) by mouth as needed for intestinal gas   tacrolimus (GENERIC EQUIVALENT) 1 MG capsule  Other No Yes   Sig: Take 2 capsules (2 mg) by mouth 2 times daily Total dose 2.5 mg twice daily      Facility-Administered Medications: None     Allergies   Allergies   Allergen Reactions     Bactrim Ds [Sulfamethoxazole W/Trimethoprim]      Creatinine level increased     Atorvastatin Calcium      myalgias, muscle aches     Codeine Nausea and Vomiting     Darvocet [Propoxyphene N-Apap] Nausea and Vomiting     Hydromorphone      Levofloxacin Other (See Comments) and Diarrhea     hallucinations     Morphine      Nausea and vomiting     Niaspan [Niacin] GI Disturbance     Flushing even at low dose, GI upset     Percocet [Oxycodone-Acetaminophen]      Vomiting after taking med couple of times     Vicodin [Hydrocodone-Acetaminophen] Nausea and Vomiting       Immunization History   Immunization History   Administered Date(s) Administered     HepB 01/13/2003     Influenza (H1N1) 11/25/2009     Influenza (High Dose) 3 valent vaccine 12/01/2015, 10/07/2016     Influenza (IIV3) PF 11/30/2000, 11/04/2002, 11/14/2003, 10/22/2004, 11/03/2005, 12/04/2006, 11/12/2007, 10/21/2008, 10/06/2009, 11/09/2010, 11/05/2011     Influenza Vaccine IM 3yrs+ 4 Valent IIV4 10/16/2013     Pneumo Conj 13-V (2010&after) 12/01/2015     Pneumococcal 23 valent 06/17/2013     TDAP Vaccine (Adacel) 06/17/2013     Zoster  vaccine, live 06/17/2013       Social History   I have reviewed this patient's social history and updated it with pertinent information if needed. Gem Jade  reports that she quit smoking about 30 years ago. She quit after 17.00 years of use. She does not have any smokeless tobacco history on file. She reports that she does not drink alcohol or use illicit drugs.    Family History   I have reviewed this patient's family history and updated it with pertinent information if needed.   Family History   Problem Relation Age of Onset     DIABETES Mother      DIABETES Brother      Hypertension Father      [id number] pneumonia     HEART DISEASE Father        Review of Systems   The 10 point Review of Systems could not be obtained as patient does not provide any history.     Physical Exam   Temp: 98.2  F (36.8  C) Temp src: Axillary BP: 163/89 Pulse: 68 Heart Rate: 74 Resp: 14 SpO2: 98 % O2 Device: Nasal cannula Oxygen Delivery: 2 LPM  Vital Signs with Ranges  Temp:  [96.8  F (36  C)-99.2  F (37.3  C)] 98.2  F (36.8  C)  Pulse:  [68-70] 68  Heart Rate:  [62-76] 74  Resp:  [10-18] 14  BP: (143-171)/() 163/89  SpO2:  [84 %-100 %] 98 %  238 lbs 0 oz    GENERAL APPEARANCE:  Sleeping, in no acute distress   EYES: Eyes grossly normal to inspection, PERRL and conjunctivae and sclerae normal  HENT: ear canals and TM's normal and nose and mouth without ulcers or lesions  NECK: no adenopathy, no asymmetry, masses, or scars and thyroid normal to palpation  RESP: lungs clear to auscultation - no rales, rhonchi or wheezes  CV: regular rates and rhythm, normal S1 S2, no S3 or S4 and no murmur, click or rub  LYMPHATICS: normal ant/post cervical and supraclavicular nodes  ABDOMEN: soft, nontender, without hepatosplenomegaly or masses and bowel sounds normal  MS: extremities normal- no gross deformities noted  SKIN: no suspicious lesions or rashes      Data   Lab Results   Component Value Date    WBC 5.4 01/12/2018    HGB 11.7  01/12/2018    HCT 35.2 01/12/2018     (L) 01/12/2018     (L) 01/12/2018    POTASSIUM 4.3 01/12/2018    CHLORIDE 100 01/12/2018    CO2 26 01/12/2018    BUN 20 01/12/2018    CR 1.06 (H) 01/12/2018     (H) 01/12/2018    SED 53 (H) 05/25/2016    NTBNPI 660 01/10/2016    TROPONIN <0.04 06/15/2007    TROPI <0.015 11/13/2017    AST 24 01/11/2018    ALT 16 01/11/2018    ALKPHOS 86 01/11/2018    BILITOTAL 0.3 01/11/2018    NOREEN 17 12/20/2017    INR 1.09 11/06/2017       Recent Labs  Lab 01/11/18  1614 01/11/18  1530   CULT No growth after 10 hours  PENDING No growth after 11 hours     Recent Labs   Lab Test  01/11/18   1614  01/11/18   1530  12/19/17   2233  12/19/17   1900  12/19/17   1620  12/19/17   1619  12/19/17   1602  11/13/17   1714  11/13/17   1646   CULT  No growth after 10 hours  PENDING  No growth after 11 hours  No MRSA isolated  No growth after 24 days  No growth  No growth  No growth  No growth  >100,000 colonies/mL  Candida albicans / dubliniensis  Candida albicans and Candida dubliniensis are not routinely speciated  Susceptibility testing not routinely done  *

## 2018-01-12 NOTE — PLAN OF CARE
Problem: Patient Care Overview  Goal: Plan of Care/Patient Progress Review  Outcome: No Change  Unresponsive at arrival , RR 11 . Blood pressure high . Blood sugar 117 . RRT called .

## 2018-01-12 NOTE — PLAN OF CARE
Problem: Patient Care Overview  Goal: Plan of Care/Patient Progress Review  Outcome: Declining  Patient unresponsive to verbal stimuli; eyes flutter shut.  Patient undergoing EEG.  VSS on 1L NC.  Transferred to cart with assist of 2 and mechanical lift; total care required.  Tele NSR.  Report given to ICU RNKwasi for transfer to unit.

## 2018-01-13 ENCOUNTER — APPOINTMENT (OUTPATIENT)
Dept: MRI IMAGING | Facility: CLINIC | Age: 73
DRG: 698 | End: 2018-01-13
Attending: INTERNAL MEDICINE
Payer: MEDICARE

## 2018-01-13 LAB
10OH-CARBAZEPINE SERPL-MCNC: 21.6 UG/ML
ANION GAP SERPL CALCULATED.3IONS-SCNC: 8 MMOL/L (ref 3–14)
BASE DEFICIT BLDA-SCNC: 2 MMOL/L
BASE EXCESS BLDA CALC-SCNC: 0.1 MMOL/L
BASE EXCESS BLDA CALC-SCNC: 1.2 MMOL/L
BUN SERPL-MCNC: 16 MG/DL (ref 7–30)
CALCIUM SERPL-MCNC: 8 MG/DL (ref 8.5–10.1)
CHLORIDE SERPL-SCNC: 104 MMOL/L (ref 94–109)
CO2 SERPL-SCNC: 24 MMOL/L (ref 20–32)
CREAT SERPL-MCNC: 0.95 MG/DL (ref 0.52–1.04)
ERYTHROCYTE [DISTWIDTH] IN BLOOD BY AUTOMATED COUNT: 14.2 % (ref 10–15)
GFR SERPL CREATININE-BSD FRML MDRD: 58 ML/MIN/1.7M2
GLUCOSE BLDC GLUCOMTR-MCNC: 72 MG/DL (ref 70–99)
GLUCOSE BLDC GLUCOMTR-MCNC: 74 MG/DL (ref 70–99)
GLUCOSE BLDC GLUCOMTR-MCNC: 77 MG/DL (ref 70–99)
GLUCOSE BLDC GLUCOMTR-MCNC: 78 MG/DL (ref 70–99)
GLUCOSE BLDC GLUCOMTR-MCNC: 79 MG/DL (ref 70–99)
GLUCOSE SERPL-MCNC: 75 MG/DL (ref 70–99)
HCO3 BLD-SCNC: 23 MMOL/L (ref 21–28)
HCO3 BLD-SCNC: 24 MMOL/L (ref 21–28)
HCO3 BLD-SCNC: 25 MMOL/L (ref 21–28)
HCT VFR BLD AUTO: 35 % (ref 35–47)
HGB BLD-MCNC: 11.6 G/DL (ref 11.7–15.7)
MCH RBC QN AUTO: 30.3 PG (ref 26.5–33)
MCHC RBC AUTO-ENTMCNC: 33.1 G/DL (ref 31.5–36.5)
MCV RBC AUTO: 91 FL (ref 78–100)
MRSA DNA SPEC QL NAA+PROBE: NEGATIVE
O2/TOTAL GAS SETTING VFR VENT: 40 %
O2/TOTAL GAS SETTING VFR VENT: 80 %
O2/TOTAL GAS SETTING VFR VENT: ABNORMAL %
OXYHGB MFR BLD: 99 % (ref 92–100)
OXYHGB MFR BLD: 99 % (ref 92–100)
PCO2 BLD: 31 MM HG (ref 35–45)
PCO2 BLD: 39 MM HG (ref 35–45)
PCO2 BLD: 41 MM HG (ref 35–45)
PH BLD: 7.36 PH (ref 7.35–7.45)
PH BLD: 7.41 PH (ref 7.35–7.45)
PH BLD: 7.5 PH (ref 7.35–7.45)
PLATELET # BLD AUTO: 119 10E9/L (ref 150–450)
PO2 BLD: 135 MM HG (ref 80–105)
PO2 BLD: 151 MM HG (ref 80–105)
PO2 BLD: 323 MM HG (ref 80–105)
POTASSIUM SERPL-SCNC: 3.9 MMOL/L (ref 3.4–5.3)
RBC # BLD AUTO: 3.83 10E12/L (ref 3.8–5.2)
SODIUM SERPL-SCNC: 136 MMOL/L (ref 133–144)
SPECIMEN SOURCE: NORMAL
TACROLIMUS BLD-MCNC: 3.7 UG/L (ref 5–15)
TME LAST DOSE: ABNORMAL H
WBC # BLD AUTO: 5.4 10E9/L (ref 4–11)

## 2018-01-13 PROCEDURE — 82803 BLOOD GASES ANY COMBINATION: CPT | Performed by: INTERNAL MEDICINE

## 2018-01-13 PROCEDURE — 25000125 ZZHC RX 250: Performed by: INTERNAL MEDICINE

## 2018-01-13 PROCEDURE — 82805 BLOOD GASES W/O2 SATURATION: CPT | Performed by: INTERNAL MEDICINE

## 2018-01-13 PROCEDURE — 85027 COMPLETE CBC AUTOMATED: CPT | Performed by: INTERNAL MEDICINE

## 2018-01-13 PROCEDURE — 25000128 H RX IP 250 OP 636: Performed by: HOSPITALIST

## 2018-01-13 PROCEDURE — 70553 MRI BRAIN STEM W/O & W/DYE: CPT

## 2018-01-13 PROCEDURE — 27210995 ZZH RX 272: Performed by: INTERNAL MEDICINE

## 2018-01-13 PROCEDURE — A9585 GADOBUTROL INJECTION: HCPCS | Performed by: INTERNAL MEDICINE

## 2018-01-13 PROCEDURE — 00000146 ZZHCL STATISTIC GLUCOSE BY METER IP

## 2018-01-13 PROCEDURE — A9270 NON-COVERED ITEM OR SERVICE: HCPCS | Mod: GY | Performed by: HOSPITALIST

## 2018-01-13 PROCEDURE — 36600 WITHDRAWAL OF ARTERIAL BLOOD: CPT

## 2018-01-13 PROCEDURE — 80048 BASIC METABOLIC PNL TOTAL CA: CPT | Performed by: INTERNAL MEDICINE

## 2018-01-13 PROCEDURE — 40000275 ZZH STATISTIC RCP TIME EA 10 MIN

## 2018-01-13 PROCEDURE — 94003 VENT MGMT INPAT SUBQ DAY: CPT

## 2018-01-13 PROCEDURE — 94002 VENT MGMT INPAT INIT DAY: CPT

## 2018-01-13 PROCEDURE — 25000128 H RX IP 250 OP 636: Performed by: INTERNAL MEDICINE

## 2018-01-13 PROCEDURE — 25000132 ZZH RX MED GY IP 250 OP 250 PS 637: Mod: GY | Performed by: HOSPITALIST

## 2018-01-13 PROCEDURE — 40000281 ZZH STATISTIC TRANSPORT TIME EA 15 MIN

## 2018-01-13 PROCEDURE — 99291 CRITICAL CARE FIRST HOUR: CPT | Mod: 25 | Performed by: INTERNAL MEDICINE

## 2018-01-13 PROCEDURE — 36415 COLL VENOUS BLD VENIPUNCTURE: CPT | Performed by: INTERNAL MEDICINE

## 2018-01-13 PROCEDURE — 25000131 ZZH RX MED GY IP 250 OP 636 PS 637: Mod: GY | Performed by: HOSPITALIST

## 2018-01-13 PROCEDURE — 40000008 ZZH STATISTIC AIRWAY CARE

## 2018-01-13 PROCEDURE — 99207 ZZC NON-BILLABLE SERV PER CHARTING: CPT | Performed by: HOSPITALIST

## 2018-01-13 PROCEDURE — 20000003 ZZH R&B ICU

## 2018-01-13 PROCEDURE — 87633 RESP VIRUS 12-25 TARGETS: CPT | Performed by: INTERNAL MEDICINE

## 2018-01-13 PROCEDURE — 99233 SBSQ HOSP IP/OBS HIGH 50: CPT | Performed by: INTERNAL MEDICINE

## 2018-01-13 PROCEDURE — 80197 ASSAY OF TACROLIMUS: CPT | Performed by: INTERNAL MEDICINE

## 2018-01-13 RX ORDER — FENTANYL CITRATE 50 UG/ML
25 INJECTION, SOLUTION INTRAMUSCULAR; INTRAVENOUS
Status: DISCONTINUED | OUTPATIENT
Start: 2018-01-13 | End: 2018-01-15

## 2018-01-13 RX ORDER — GADOBUTROL 604.72 MG/ML
11 INJECTION INTRAVENOUS ONCE
Status: COMPLETED | OUTPATIENT
Start: 2018-01-13 | End: 2018-01-13

## 2018-01-13 RX ORDER — LORAZEPAM 2 MG/ML
1 INJECTION INTRAMUSCULAR ONCE
Status: DISCONTINUED | OUTPATIENT
Start: 2018-01-13 | End: 2018-01-15

## 2018-01-13 RX ADMIN — CHLORHEXIDINE GLUCONATE 15 ML: 1.2 RINSE ORAL at 08:32

## 2018-01-13 RX ADMIN — CLONIDINE HYDROCHLORIDE 0.1 MG: 0.1 TABLET ORAL at 20:48

## 2018-01-13 RX ADMIN — ENOXAPARIN SODIUM 40 MG: 40 INJECTION SUBCUTANEOUS at 20:48

## 2018-01-13 RX ADMIN — MEROPENEM 500 MG: 500 INJECTION, POWDER, FOR SOLUTION INTRAVENOUS at 02:08

## 2018-01-13 RX ADMIN — SODIUM CHLORIDE: 9 INJECTION, SOLUTION INTRAVENOUS at 15:47

## 2018-01-13 RX ADMIN — Medication 150 MG: at 20:48

## 2018-01-13 RX ADMIN — FAMOTIDINE 20 MG: 10 INJECTION, SOLUTION INTRAVENOUS at 20:49

## 2018-01-13 RX ADMIN — PROPOFOL 20 MCG/KG/MIN: 10 INJECTION, EMULSION INTRAVENOUS at 05:53

## 2018-01-13 RX ADMIN — SODIUM CHLORIDE: 9 INJECTION, SOLUTION INTRAVENOUS at 02:49

## 2018-01-13 RX ADMIN — MEROPENEM 500 MG: 500 INJECTION, POWDER, FOR SOLUTION INTRAVENOUS at 08:11

## 2018-01-13 RX ADMIN — TACROLIMUS 2 MG: 1 CAPSULE ORAL at 20:48

## 2018-01-13 RX ADMIN — LABETALOL HYDROCHLORIDE 10 MG: 5 INJECTION, SOLUTION INTRAVENOUS at 16:37

## 2018-01-13 RX ADMIN — GADOBUTROL 11 ML: 604.72 INJECTION INTRAVENOUS at 11:39

## 2018-01-13 RX ADMIN — CHLORHEXIDINE GLUCONATE 15 ML: 1.2 RINSE ORAL at 20:50

## 2018-01-13 RX ADMIN — VITAMIN D, TAB 1000IU (100/BT) 4000 UNITS: 25 TAB at 20:48

## 2018-01-13 RX ADMIN — CEFTRIAXONE 1 G: 10 INJECTION, POWDER, FOR SOLUTION INTRAVENOUS at 16:06

## 2018-01-13 RX ADMIN — FAMOTIDINE 20 MG: 10 INJECTION, SOLUTION INTRAVENOUS at 05:04

## 2018-01-13 NOTE — PLAN OF CARE
Problem: Patient Care Overview  Goal: Plan of Care/Patient Progress Review  OT/PT: Orders received. Per discussion with RN, pt not appropriate for today.

## 2018-01-13 NOTE — PROGRESS NOTES
[facility] ICU RESPIRATORY NOTE  Date of Admission: 1/11/18  Date of Intubation (most recent): 1/12/18  Reason for Mechanical Ventilation: Seizure/airway protection   Number of Days on Mechanical Ventilation: 2  Met Criteria for Pressure Support Trial: No  Length of Pressure Support Trial:  Reason for Stopping Pressure Support Trial:  Reason for No Pressure Support Trial: Per MD    Significant Events Today: None this shift    ABG Results:     Recent Labs  Lab 01/13/18  0430 01/13/18  0023   PH 7.41 7.50*   PCO2 39 31*   PO2 135* 323*   HCO3 25 24   O2PER 40 80     Ventilation Mode: CMV/AC  FiO2 (%): 40 %  Rate Set (breaths/minute): 12 breaths/min  Tidal Volume Set (mL): 480 mL  PEEP (cm H2O): 5 cmH2O  Oxygen Concentration (%): 40 %  Resp: 16      ETT appearance on chest x-ray: In good position     Plan:  Will continue to follow.     Dimitry Berrios RT

## 2018-01-13 NOTE — PROGRESS NOTES
EEG CLINICAL NEUROPHYSIOLOGY PRELIMINARY REPORT    EEG through 0800 today reviewed. Continues with moderate diffuse encephalopathy. Continues with runs of triphasic activity. Less triphasic activity and somewhat better background this morning perhaps because sedative drips are less persistently infused (at least per MAR). No electrographic seizures.    Trey Avina MD  Pager [phone]

## 2018-01-13 NOTE — PROGRESS NOTES
Renal Medicine Progress Note                                Gem Jade MRN# [id number]   Age: 72 year old YOB: 1945   Date of Admission: 1/11/2018 Hospital LOS: 2                  Assessment/Plan:     72-year-old female admitted through the emergency room on 01/11/2018 in the setting   of altered mental status    Evaluation and assessment in the setting of known end-stage renal disease, status post   previous renal transplant and modest electrolyte abnormalities      1.  ESRD  2.  LDKT    -baseline creatinine 1.0 mg/dl range   -sclerosis and TI scarring by allograft biopsy  3.  Immunocompromised host  4.  Acute Kidney Injury   -resolved  5.  Hyponatremia   -corrected  6.  AMS   -vented    TAC level noted (perhaps slightly elevated)  Creatinine baseline    Continue current cares      Interval History:     Transferred to ICU.  Vented.  No history available from patient.  Labs reviewed.     ROS:     Intubated and sedated: ROS unable    Medications and Allergies:     Reviewed    Physical Exam:     Vitals were reviewed  Patient Vitals for the past 8 hrs:   BP Temp Temp src Heart Rate SpO2   01/13/18 1125 96/70 - - 83 100 %   01/13/18 1110 108/54 - - 82 99 %   01/13/18 0900 174/58 - - 74 100 %   01/13/18 0800 172/67 98.8  F (37.1  C) Oral 66 100 %   01/13/18 0700 140/50 - - 64 -   01/13/18 0615 158/59 - - 65 100 %   01/13/18 0600 170/85 - - 69 100 %   01/13/18 0545 164/72 - - 64 100 %   01/13/18 0530 123/44 - - 61 100 %   01/13/18 0515 151/59 - - 64 100 %   01/13/18 0500 131/65 - - 61 100 %   01/13/18 0445 126/40 - - 61 100 %   01/13/18 0433 - - - - 100 %   01/13/18 0430 167/63 - - 65 100 %   01/13/18 0415 148/50 - - 61 100 %   01/13/18 0400 167/72 97.9  F (36.6  C) Axillary 61 100 %   01/13/18 0345 144/42 - - 61 100 %     I/O last 3 completed shifts:  In: 3276.76 [I.V.:0746.76; IV Piggyback:1000]  Out: 1400 [Urine:1400]    Vitals:    01/11/18 1436   Weight: 108 kg (238 lb)         GENERAL:  intubated  and sedated  HEENT: ETT  RESP:  clear anteriorly  CV: RRR, normal S1 S2  ABDOMEN: soft, nontender, no HSM or masses and bowel sounds normal  MS: no clubbing, cyanosis   SKIN: clear without significant rashes or lesions  EXT: warm, no edema    Data:       Recent Labs  Lab 01/13/18  0535 01/12/18  0648 01/11/18  1502 01/11/18  1331    132* 128* 132*   POTASSIUM 3.9 4.3 4.9 4.9   CHLORIDE 104 100 95 100   CO2 24 26 27 18*   ANIONGAP 8 6 6 14   GLC 75 131* 119* 23*   BUN 16 20 24 27   CR 0.95 1.06* 1.20* 1.16*   GFRESTIMATED 58* 51* 44* 46*   GFRESTBLACK 70 62 54* 56*   ROGER 8.0* 8.2* 8.5 8.3*           Recent Labs  Lab 01/13/18  0535      POTASSIUM 3.9   CHLORIDE 104   CO2 24   ANIONGAP 8   GLC 75   BUN 16   CR 0.95   GFRESTIMATED 58*   GFRESTBLACK 70   ROGER 8.0*     Recent Labs   Lab Test  01/13/18   0535  01/12/18   0648  01/11/18   1502  01/11/18   1331  12/28/17   1230  12/24/17   0605  12/23/17   0655  12/21/17   0452  12/20/17   0525  12/19/17   1602   CR  0.95  1.06*  1.20*  1.16*  0.96  1.29*  1.10*  1.11*  1.13*  1.21*       Recent Labs  Lab 01/13/18  0535 01/12/18  0648 01/11/18  1502   HGB 11.6* 11.7 11.9         G Dimitry Phillips    [facility] - Nephrology  [phone]

## 2018-01-13 NOTE — PROGRESS NOTES
Critical Care Progress Note      01/13/2018    Name: Gem Jade MRN#: [id number]   Age: 72 year old YOB: 1945     Hsptl Day# 2  ICU DAY #1    MV DAY #1             Problem List:   Active Problems:    Altered mental state    Status epilepticus (H)           Summary/Hospital Course:     Ms. Gem Jade is a 72 yof with a history of neurogenic bladder with chronic indwelling rico, recurrent UTIs, and kidney transplant (1999) maintained on tacrolimus and 5 mg prednisone who is admitted to the ICU in the setting of status epilepticus (NCSE).  Briefly, she was admitted to the hospital on 1/11 with altered mental status which was thought to be 2/2 hypoglycemia with glucose 28.  Her rico cathter was exchanged in the ED given dirty UA and long history of recurrent UTIs.  She was tested for influenza and found to be negative.  CT head was obtained and negative for acute pathology.  She was admitted to the floor and underwent ID work up and consult and well as neuro consult for persistently altered mental status.  Infection was felt to be unlikely due to normal WBC, normal procal and absence of fevers. Altered mental status was persistent, EEG was obtained and she was found to be status epilepticus necessitating transfer to the ICU for further cares.       On arrival to the ICU her eyes were open but she was not tracking.  She withdrew to nailbed pressure but was otherwise unresponsive.  The decision was made to intubate for airway protection.  Anesthesia was called and the patient was intubated without difficulty.  She remains hemodynamically stable.      Assessment and plan :     Gem Jade IS a 72 year old female admitted on 1/11/2018 for acute encephalopathy requiring intubation - concern for non convulsive status epilepticus.   I have personally reviewed the daily labs, imaging studies, cultures and discussed the case with referring physician and consulting physicians.     My assessment and  plan by system for this patient is as follows:    Neurology/Psychiatry:   1. Acute encephalopathy -secondary to status epilepticus -EEG more suggestive of metabolic encephalopathy  2. Analgesia/Anxiety  Plan  -Holding home baclofen gabapentin Trileptal and tramadol  -Neuro crit following  -Continue fosphenytoin, EEG monitoring  -Goal rass 0-1  -Wean propofol, low-dose fentanyl    Cardiovascular:   1.Hemodynamics -appropriate  2.Rhythm -sinus  3. Ischemia -no evidence  Plan   -Continue prior to admission aspirin clonidine and labetalol pravastatin  -Consider  as needed's if systolic greater than 180    Pulmonary/Ventilator Management:   1.  Acute hypoxic respiratory failure secondary to encephalopathy - oxygenation/ventilation/mechanics appropriate  Plan  - continue lung protective ventilation  - attempt sedation holiday and spontaneous breathing trial this a.m., though mental status precludes extubation    GI and Nutrition :   1. No active issues   2. NPO  Plan  - follow exams      Renal/Fluids/Electrolytes:   1. CKD - Stage IV CKD >  ESRD , Hx of renal transplant   2. Neurogenic bladder with chronic rico (exchanged 1/11)  3. Electrolyte abnormality -   3. Acid/base status  4. Volume status  Plan  - continue tacrolimus and prednisone  - tacro levels per renal/pharmacy   - renal following   - monitor function and electrolytes as needed with replacement per ICU protocols.  - generally avoid nephrotoxic agents such as NSAID, IV contrast unless specifically required  - adjust medications as needed for renal clearance  - follow I/O's as appropriate.    Infectious Disease:   1.  Chronic recurrent urinary tract infections with chronic indwelling Rico, in an immunocompromised host ; ID following, culture thus far with pansensitive E. Coli but hx of MRDO  2. Hx of VRE and ESBL  Plan  - FUP cultures  - continue meropenem for now  - ID following     Endocrine:   1. Stress induced hyperglycemia  2. Hypothyroidism  3.  Hyperparathyroidism  Plan  - ICU insulin protocol, goal sugar <180  - hold off long acting insulin-   - continue home levothyroxine     Hematology/Oncology:   1. Chronic Anemia, no signs, symptoms of active blood loss  2. Hx of PE  Plan  - serial CBC coags  - DVT prophylaxis      MSKL/Rheum:  1. No active issues   Plan  - wound care per unit routine       IV/Access:   1. Venous access - PICC  2. Arterial access -  none  Plan  - central access required and necessary      ICU Prophylaxis:   1. DVT: LMWH  2. VAP: HOB 30 degrees, chlorhexidine rinse  3. Stress Ulcer: PPI/H2 blocker  4. Restraints: Nonviolent soft two point restraints required and necessary for patient safety and continued cares and good effect as patient continues to pull at necessary lines, tubes despite education and distraction. Will readdress daily.   5. Wound care - per unit routine   6. Feeding - NPO  7. Family Update:pending   8. Disposition - ICU        Key goals for next 24 hours:   1. SBT trial, SAT   2. MRI   3. DC eeg monitoring (per neuro)                  Interim History:     - BP drop after fosphenytoin bolus briefly required dopamine   -  abx broadened to meropenem as E Coli (though pan sensitive actually HTN this AM in setting of weaning propofol  - SBT started this AM  -Portable EEG demonstrating moderate to severe diffuse encephalopathy suggestive of metabolic origin  - this AM          Key Medications:       LORazepam  1 mg Intravenous Once     chlorhexidine  15 mL Mouth/Throat Q12H     famotidine  20 mg Intravenous Q12H     insulin aspart  1-6 Units Subcutaneous Q4H     aspirin  81 mg Oral Daily     cloNIDine  0.1 mg Oral BID     labetalol  150 mg Oral BID     lactobacillus rhamnosus (GG)  1 capsule Oral BID     levothyroxine  50 mcg Oral Daily     pravastatin  10 mg Oral Daily     predniSONE  5 mg Oral Daily     tacrolimus  2 mg Oral BID     cholecalciferol  4,000 Units Oral BID     sodium chloride (PF)  3 mL Intracatheter Q8H      enoxaparin  40 mg Subcutaneous Q24H     meropenem  500 mg Intravenous Q6H       NaCl 100 mL/hr at 01/13/18 0249     fentaNYL 75 mcg/hr (01/12/18 1855)     propofol (DIPRIVAN) infusion 20 mcg/kg/min (01/13/18 0553)     - MEDICATION INSTRUCTIONS -       DOPamine                 Physical Examination:   Temp:  [97.9  F (36.6  C)-98.2  F (36.8  C)] 97.9  F (36.6  C)  Pulse:  [76] 76  Heart Rate:  [57-78] 64  Resp:  [16] 16  BP: ()/(25-97) 140/50  FiO2 (%):  [40 %-80 %] 40 %  SpO2:  [98 %-100 %] 100 %    Intake/Output Summary (Last 24 hours) at 01/13/18 0758  Last data filed at 01/13/18 0600   Gross per 24 hour   Intake          3276.76 ml   Output             1400 ml   Net          1876.76 ml     Wt Readings from Last 4 Encounters:   01/11/18 108 kg (238 lb)   12/23/17 108 kg (238 lb)   11/17/17 101.7 kg (224 lb 1.6 oz)   11/08/17 103.5 kg (228 lb 2.8 oz)     BP - Mean:  [] 85  Ventilation Mode: CMV/AC  FiO2 (%): 40 %  Rate Set (breaths/minute): 12 breaths/min  Tidal Volume Set (mL): 480 mL  PEEP (cm H2O): 5 cmH2O  Oxygen Concentration (%): 40 %  Resp: 16    Recent Labs  Lab 01/13/18  0430 01/13/18  0023   PH 7.41 7.50*   PCO2 39 31*   PO2 135* 323*   HCO3 25 24   O2PER 40 80       GEN: intubated sedate no acute distress   HEENT: head ncat, sclera anicteric, OP patent, trachea midline   PULM: unlabored synchronous with vent, clear anteriorly , poor cough   CV/COR: RRR S1S2 no gallop,  No rub, no murmur  ABD: soft nontender, hypoactive bowel sounds, no mass  EXT:  2+ edema,  warm  NEURO: follows simple commands, inattentive weak gag, grossly intact  SKIN: no obvious rash  LINES: clean, dry intact         Data:   All data and imaging reviewed     ROUTINE ICU LABS (Last four results)  CMP  Recent Labs  Lab 01/13/18  0535 01/12/18  0648 01/11/18  1502 01/11/18  1331    132* 128* 132*   POTASSIUM 3.9 4.3 4.9 4.9   CHLORIDE 104 100 95 100   CO2 24 26 27 18*   ANIONGAP 8 6 6 14   GLC 75 131* 119* 23*   BUN 16  20 24 27   CR 0.95 1.06* 1.20* 1.16*   GFRESTIMATED 58* 51* 44* 46*   GFRESTBLACK 70 62 54* 56*   ROGER 8.0* 8.2* 8.5 8.3*   PROTTOTAL  --   --  8.2  --    ALBUMIN  --   --  3.1*  --    BILITOTAL  --   --  0.3  --    ALKPHOS  --   --  86  --    AST  --   --  24  --    ALT  --   --  16  --      CBC  Recent Labs  Lab 01/13/18  0535 01/12/18  0648 01/11/18  1502   WBC 5.4 5.4 6.1   RBC 3.83 3.89 3.92   HGB 11.6* 11.7 11.9   HCT 35.0 35.2 35.5   MCV 91 91 91   MCH 30.3 30.1 30.4   MCHC 33.1 33.2 33.5   RDW 14.2 14.1 13.8   * 142* 166     INRNo lab results found in last 7 days.  Arterial Blood Gas  Recent Labs  Lab 01/13/18  0430 01/13/18  0023   PH 7.41 7.50*   PCO2 39 31*   PO2 135* 323*   HCO3 25 24   O2PER 40 80       All cultures:    Recent Labs  Lab 01/11/18  1614 01/11/18  1530   CULT No growth after 2 days  >100,000 colonies/mLEscherichia coli* No growth after 2 days     Recent Results (from the past 24 hour(s))   XR Chest Port 1 View    Narrative    XR CHEST PORT 1 VW  1/12/2018 5:30 PM     HISTORY:  intubation;     COMPARISON: Film dated 1/11/2018    FINDINGS:  ET tube is in place. The tip is about 1 cm above the  abena. It could be pulled back. The lungs remain clear. Aortic  calcification.      Impression    IMPRESSION: ET tube tip is just above the abena comment could be  pulled back.    NAYANA GONSALES MD   XR Chest Port 1 View    Narrative    XR CHEST PORT 1 VW  1/12/2018 5:35 PM     HISTORY:  Endotracheal tube positioning;     COMPARISON: None.    FINDINGS:  The ET tube has been repositioned. The tip is now about 2.7  cm above the level of the abena. The lungs remain clear.    NAYANA GONSALES MD         Billing: This patient is critically ill: Yes. Total critical care time today 45 min.

## 2018-01-13 NOTE — PLAN OF CARE
"Problem: Patient Care Overview  Goal: Individualization & Mutuality    Patient has been awake and following commands, propofol off since 0800, fentanyl off at 930am, EEG cancelled as there were no seizure activities noted since admission, intubated and still slightly lethargic with weak cough. Will hopefully be able to extubate later today when she wakes up more. /47  Pulse 76  Temp 99.8  F (37.7  C) (Oral)  Resp 16  Ht 1.676 m (5' 6\")  Wt 108 kg (238 lb)  SpO2 100%  BMI 38.41 kg/m2    Rico patent, no BM, MRI brain done, oral cares, rico cares,  updated bedside.  /hour, no oral meds given for lack of oral access.      "

## 2018-01-13 NOTE — PROGRESS NOTES
Neuroscience Intensive Care Progress Note    2018    Assessment/Plan  Gem Jade is a 72 year old h/o neurogenic bladder, recurrent UTI, renal transplant () on immune suppression admitted 2018 with suspected status epilepticus. She was also found to be hypoglycemic to 28 that was corrected. Patient did get a loading dose of fosphenytoin initially.     - Her EEG is consistent with metabolic encephalopathy with triphasic waves. No epileptiform discharges seen.  - Patient is improving clinically, followed commands this AM while intubated  - CT head with no acute process, MRI results pending.    Plan: No further investigations/ antiepileptic medications recommended. Further medical management per ICU team. Will sign off. Please call with questions.       24 hour events:  Remains intubated. More awake this morning, follows commands.     24 Hour Vital Signs Summary:  Temperatures:  Current - Temp: 99.8  F (37.7  C); Max - Temp  Av.6  F (37  C)  Min: 97.9  F (36.6  C)  Max: 99.8  F (37.7  C)  Respiration range: Resp  Av  Min: 16  Max: 16  Pulse range: Pulse  Av  Min: 76  Max: 76  Blood pressure range: Systolic (24hrs), Av , Min:48 , Max:187   ; Diastolic (24hrs), Av, Min:25, Max:97    Pulse oximetry range: SpO2  Av.9 %  Min: 98 %  Max: 100 %    Ventilator Settings  Ventilation Mode: CMV/AC  FiO2 (%): 40 %  Rate Set (breaths/minute): 12 breaths/min  Tidal Volume Set (mL): 480 mL  PEEP (cm H2O): 5 cmH2O  Pressure Support (cm H2O): 5 cmH2O  Oxygen Concentration (%): 40 %  Resp: 16      Intake/Output Summary (Last 24 hours) at 18 1220  Last data filed at 18 1200   Gross per 24 hour   Intake          3276.76 ml   Output             1600 ml   Net          1676.76 ml       BP - Mean:  [] 78    Current Medications:    LORazepam  1 mg Intravenous Once     LORazepam  1 mg Intravenous Once     chlorhexidine  15 mL Mouth/Throat Q12H     famotidine  20 mg Intravenous  "Q12H     insulin aspart  1-6 Units Subcutaneous Q4H     aspirin  81 mg Oral Daily     cloNIDine  0.1 mg Oral BID     labetalol  150 mg Oral BID     levothyroxine  50 mcg Oral Daily     pravastatin  10 mg Oral Daily     predniSONE  5 mg Oral Daily     tacrolimus  2 mg Oral BID     cholecalciferol  4,000 Units Oral BID     sodium chloride (PF)  3 mL Intracatheter Q8H     enoxaparin  40 mg Subcutaneous Q24H     meropenem  500 mg Intravenous Q6H       PRN Medications:  fentaNYL, midazolam, naloxone, propofol (DIPRIVAN) infusion **AND** propofol **AND** CK total **AND** Triglycerides, - MEDICATION INSTRUCTIONS -, senna-docusate **OR** senna-docusate, guaiFENesin, lidocaine (buffered or not buffered), lidocaine 4%, sodium chloride (PF), acetaminophen, acetaminophen, polyethylene glycol, bisacodyl, ondansetron **OR** ondansetron, labetalol, cloNIDine, glucose **OR** dextrose **OR** glucagon    Infusions:    NaCl Stopped (01/13/18 0938)     propofol (DIPRIVAN) infusion Stopped (01/13/18 0800)     - MEDICATION INSTRUCTIONS -         Allergies   Allergen Reactions     Bactrim Ds [Sulfamethoxazole W/Trimethoprim]      Creatinine level increased     Atorvastatin Calcium      myalgias, muscle aches     Codeine Nausea and Vomiting     Darvocet [Propoxyphene N-Apap] Nausea and Vomiting     Hydromorphone      Levofloxacin Other (See Comments) and Diarrhea     hallucinations     Morphine      Nausea and vomiting     Niaspan [Niacin] GI Disturbance     Flushing even at low dose, GI upset     Percocet [Oxycodone-Acetaminophen]      Vomiting after taking med couple of times     Vicodin [Hydrocodone-Acetaminophen] Nausea and Vomiting       Physical Examination:  /47  Pulse 76  Temp 99.8  F (37.7  C) (Oral)  Resp 16  Ht 1.676 m (5' 6\")  Wt 108 kg (238 lb)  SpO2 100%  BMI 38.41 kg/m2  Intubated, sedated. Opens eyes on verbal commands. Follows simple commands. No gaze preference noted. PERRLA, EOMI. Moves all four extremities " and localizes touch equally.       Labs/Studies:  Recent Labs   Lab Test  01/13/18   0535  01/12/18   0648  01/11/18   1502   NA  136  132*  128*   POTASSIUM  3.9  4.3  4.9   CHLORIDE  104  100  95   CO2  24  26  27   ANIONGAP  8  6  6   GLC  75  131*  119*   BUN  16  20  24   CR  0.95  1.06*  1.20*   ROGER  8.0*  8.2*  8.5   WBC  5.4  5.4  6.1   RBC  3.83  3.89  3.92   HGB  11.6*  11.7  11.9   PLT  119*  142*  166       Recent Labs   Lab Test  11/06/17   1652  04/26/17   2338  04/26/17   0638   08/07/16   0750  08/06/16   1858   INR  1.09  1.03  1.06   < >  1.12  1.09   PTT  35   --    --    --   38*  41*    < > = values in this interval not displayed.           Recent Labs  Lab 01/13/18  0430 01/13/18  0023   PH 7.41 7.50*   PCO2 39 31*   PO2 135* 323*   HCO3 25 24   O2PER 40 80       Imaging:  Reviewed    Plan discussed with staff Dr. Oliver Quintero MD  Fellow

## 2018-01-13 NOTE — PLAN OF CARE
Problem: Seizure Disorder/Epilepsy (Adult)  Goal: Signs and Symptoms of Listed Potential Problems Will be Absent, Minimized or Managed (Seizure Disorder/Epilepsy)  Signs and symptoms of listed potential problems will be absent, minimized or managed by discharge/transition of care (reference Seizure Disorder/Epilepsy (Adult) CPG).   Outcome: No Change  Patient sedated and remains minimally responsive on ventilator.  Continuous EEG.   Patient hypotensive after phosphenoiten infusion.  Dopamine required briefly for support.  BP remains labile but no further medications required.   Vent settings per intensivist.          During 0600 assessment patient able to squeeze on R  side but then drifts back to sleep.

## 2018-01-13 NOTE — PROGRESS NOTES
Hospitalist update note:    Chart reviewed, patient on mechanical ventilator.   Discussed with Dr Frey, intensivist, hospitalist service will sign off.  Please call us when patient is extubated and ready to be transferred out and if we can be of any help.    Sharri Lujan MD  Hospitalist

## 2018-01-13 NOTE — PROGRESS NOTES
Patient was extubated at 1635. Pt was placed on a 40% aerosol mask with SpO2 in the upper 90's. No stridor present.  1/13/2018  Pily Pagan RRT

## 2018-01-13 NOTE — PROGRESS NOTES
ICU Update    Discussed with NCC, no seizures, MRI no acute pathology. Will start SBT.  Plan to extubate if meets criteria.  DC meropenum, added ceftriaxone for UTI.    Santiago Frey

## 2018-01-14 ENCOUNTER — APPOINTMENT (OUTPATIENT)
Dept: PHYSICAL THERAPY | Facility: CLINIC | Age: 73
DRG: 698 | End: 2018-01-14
Attending: INTERNAL MEDICINE
Payer: MEDICARE

## 2018-01-14 LAB
ALBUMIN SERPL-MCNC: 2.3 G/DL (ref 3.4–5)
ALP SERPL-CCNC: 67 U/L (ref 40–150)
ALT SERPL W P-5'-P-CCNC: 11 U/L (ref 0–50)
ANION GAP SERPL CALCULATED.3IONS-SCNC: 9 MMOL/L (ref 3–14)
AST SERPL W P-5'-P-CCNC: 14 U/L (ref 0–45)
BASOPHILS # BLD AUTO: 0 10E9/L (ref 0–0.2)
BASOPHILS NFR BLD AUTO: 0.2 %
BILIRUB SERPL-MCNC: 0.4 MG/DL (ref 0.2–1.3)
BUN SERPL-MCNC: 12 MG/DL (ref 7–30)
CALCIUM SERPL-MCNC: 7.6 MG/DL (ref 8.5–10.1)
CHLORIDE SERPL-SCNC: 107 MMOL/L (ref 94–109)
CO2 SERPL-SCNC: 23 MMOL/L (ref 20–32)
CREAT SERPL-MCNC: 0.88 MG/DL (ref 0.52–1.04)
DIFFERENTIAL METHOD BLD: ABNORMAL
EOSINOPHIL # BLD AUTO: 0.3 10E9/L (ref 0–0.7)
EOSINOPHIL NFR BLD AUTO: 4.9 %
ERYTHROCYTE [DISTWIDTH] IN BLOOD BY AUTOMATED COUNT: 14.4 % (ref 10–15)
GFR SERPL CREATININE-BSD FRML MDRD: 63 ML/MIN/1.7M2
GLUCOSE BLDC GLUCOMTR-MCNC: 100 MG/DL (ref 70–99)
GLUCOSE BLDC GLUCOMTR-MCNC: 123 MG/DL (ref 70–99)
GLUCOSE BLDC GLUCOMTR-MCNC: 133 MG/DL (ref 70–99)
GLUCOSE BLDC GLUCOMTR-MCNC: 63 MG/DL (ref 70–99)
GLUCOSE BLDC GLUCOMTR-MCNC: 71 MG/DL (ref 70–99)
GLUCOSE BLDC GLUCOMTR-MCNC: 86 MG/DL (ref 70–99)
GLUCOSE BLDC GLUCOMTR-MCNC: 96 MG/DL (ref 70–99)
GLUCOSE SERPL-MCNC: 63 MG/DL (ref 70–99)
HCT VFR BLD AUTO: 30.7 % (ref 35–47)
HGB BLD-MCNC: 9.9 G/DL (ref 11.7–15.7)
IMM GRANULOCYTES # BLD: 0 10E9/L (ref 0–0.4)
IMM GRANULOCYTES NFR BLD: 0.3 %
LYMPHOCYTES # BLD AUTO: 1.7 10E9/L (ref 0.8–5.3)
LYMPHOCYTES NFR BLD AUTO: 29.6 %
MCH RBC QN AUTO: 29.6 PG (ref 26.5–33)
MCHC RBC AUTO-ENTMCNC: 32.2 G/DL (ref 31.5–36.5)
MCV RBC AUTO: 92 FL (ref 78–100)
MONOCYTES # BLD AUTO: 0.4 10E9/L (ref 0–1.3)
MONOCYTES NFR BLD AUTO: 7.3 %
NEUTROPHILS # BLD AUTO: 3.3 10E9/L (ref 1.6–8.3)
NEUTROPHILS NFR BLD AUTO: 57.7 %
NRBC # BLD AUTO: 0 10*3/UL
NRBC BLD AUTO-RTO: 0 /100
PLATELET # BLD AUTO: 122 10E9/L (ref 150–450)
POTASSIUM SERPL-SCNC: 3.6 MMOL/L (ref 3.4–5.3)
PROCALCITONIN SERPL-MCNC: 0.09 NG/ML
PROT SERPL-MCNC: 6.1 G/DL (ref 6.8–8.8)
RBC # BLD AUTO: 3.34 10E12/L (ref 3.8–5.2)
SODIUM SERPL-SCNC: 139 MMOL/L (ref 133–144)
WBC # BLD AUTO: 5.7 10E9/L (ref 4–11)

## 2018-01-14 PROCEDURE — 84145 PROCALCITONIN (PCT): CPT | Performed by: INTERNAL MEDICINE

## 2018-01-14 PROCEDURE — 36415 COLL VENOUS BLD VENIPUNCTURE: CPT | Performed by: INTERNAL MEDICINE

## 2018-01-14 PROCEDURE — 25000132 ZZH RX MED GY IP 250 OP 250 PS 637: Mod: GY | Performed by: INTERNAL MEDICINE

## 2018-01-14 PROCEDURE — 00000146 ZZHCL STATISTIC GLUCOSE BY METER IP

## 2018-01-14 PROCEDURE — A9270 NON-COVERED ITEM OR SERVICE: HCPCS | Mod: GY | Performed by: INTERNAL MEDICINE

## 2018-01-14 PROCEDURE — 99233 SBSQ HOSP IP/OBS HIGH 50: CPT | Performed by: INTERNAL MEDICINE

## 2018-01-14 PROCEDURE — 25000132 ZZH RX MED GY IP 250 OP 250 PS 637: Mod: GY | Performed by: HOSPITALIST

## 2018-01-14 PROCEDURE — 80048 BASIC METABOLIC PNL TOTAL CA: CPT | Performed by: INTERNAL MEDICINE

## 2018-01-14 PROCEDURE — 40000193 ZZH STATISTIC PT WARD VISIT: Performed by: PHYSICAL THERAPIST

## 2018-01-14 PROCEDURE — 85025 COMPLETE CBC W/AUTO DIFF WBC: CPT | Performed by: INTERNAL MEDICINE

## 2018-01-14 PROCEDURE — 25000128 H RX IP 250 OP 636: Performed by: INTERNAL MEDICINE

## 2018-01-14 PROCEDURE — 25000125 ZZHC RX 250: Performed by: HOSPITALIST

## 2018-01-14 PROCEDURE — 12000000 ZZH R&B MED SURG/OB

## 2018-01-14 PROCEDURE — 80053 COMPREHEN METABOLIC PANEL: CPT | Performed by: INTERNAL MEDICINE

## 2018-01-14 PROCEDURE — 97161 PT EVAL LOW COMPLEX 20 MIN: CPT | Mod: GP | Performed by: PHYSICAL THERAPIST

## 2018-01-14 PROCEDURE — 27210995 ZZH RX 272: Performed by: INTERNAL MEDICINE

## 2018-01-14 PROCEDURE — 25000125 ZZHC RX 250: Performed by: INTERNAL MEDICINE

## 2018-01-14 PROCEDURE — 99233 SBSQ HOSP IP/OBS HIGH 50: CPT | Mod: 25 | Performed by: INTERNAL MEDICINE

## 2018-01-14 PROCEDURE — A9270 NON-COVERED ITEM OR SERVICE: HCPCS | Mod: GY | Performed by: HOSPITALIST

## 2018-01-14 PROCEDURE — 25000131 ZZH RX MED GY IP 250 OP 636 PS 637: Mod: GY | Performed by: HOSPITALIST

## 2018-01-14 PROCEDURE — 25000128 H RX IP 250 OP 636: Performed by: HOSPITALIST

## 2018-01-14 RX ORDER — LABETALOL HYDROCHLORIDE 5 MG/ML
10 INJECTION, SOLUTION INTRAVENOUS EVERY 6 HOURS PRN
Status: DISCONTINUED | OUTPATIENT
Start: 2018-01-14 | End: 2018-01-15 | Stop reason: HOSPADM

## 2018-01-14 RX ORDER — OXCARBAZEPINE 300 MG/1
300 TABLET, FILM COATED ORAL 2 TIMES DAILY
Status: DISCONTINUED | OUTPATIENT
Start: 2018-01-14 | End: 2018-01-15 | Stop reason: HOSPADM

## 2018-01-14 RX ORDER — DEXTROSE MONOHYDRATE 25 G/50ML
25-50 INJECTION, SOLUTION INTRAVENOUS
Status: DISCONTINUED | OUTPATIENT
Start: 2018-01-14 | End: 2018-01-14

## 2018-01-14 RX ORDER — NICOTINE POLACRILEX 4 MG
15-30 LOZENGE BUCCAL
Status: DISCONTINUED | OUTPATIENT
Start: 2018-01-14 | End: 2018-01-14

## 2018-01-14 RX ORDER — LABETALOL HYDROCHLORIDE 5 MG/ML
10 INJECTION, SOLUTION INTRAVENOUS EVERY 6 HOURS
Status: DISCONTINUED | OUTPATIENT
Start: 2018-01-14 | End: 2018-01-14

## 2018-01-14 RX ADMIN — ACETAMINOPHEN 650 MG: 325 TABLET, FILM COATED ORAL at 16:15

## 2018-01-14 RX ADMIN — SODIUM CHLORIDE: 9 INJECTION, SOLUTION INTRAVENOUS at 02:18

## 2018-01-14 RX ADMIN — ASPIRIN 81 MG 81 MG: 81 TABLET ORAL at 09:15

## 2018-01-14 RX ADMIN — PRAVASTATIN SODIUM 10 MG: 10 TABLET ORAL at 09:14

## 2018-01-14 RX ADMIN — LABETALOL HYDROCHLORIDE 10 MG: 5 INJECTION, SOLUTION INTRAVENOUS at 17:25

## 2018-01-14 RX ADMIN — Medication 150 MG: at 09:15

## 2018-01-14 RX ADMIN — LEVOTHYROXINE SODIUM 50 MCG: 50 TABLET ORAL at 09:15

## 2018-01-14 RX ADMIN — CLONIDINE HYDROCHLORIDE 0.1 MG: 0.1 TABLET ORAL at 09:15

## 2018-01-14 RX ADMIN — TACROLIMUS 2 MG: 1 CAPSULE ORAL at 09:14

## 2018-01-14 RX ADMIN — PREDNISONE 5 MG: 5 TABLET ORAL at 09:15

## 2018-01-14 RX ADMIN — TACROLIMUS 2 MG: 1 CAPSULE ORAL at 20:43

## 2018-01-14 RX ADMIN — ACETAMINOPHEN 650 MG: 325 TABLET, FILM COATED ORAL at 06:01

## 2018-01-14 RX ADMIN — ENOXAPARIN SODIUM 40 MG: 40 INJECTION SUBCUTANEOUS at 20:41

## 2018-01-14 RX ADMIN — FAMOTIDINE 20 MG: 10 INJECTION, SOLUTION INTRAVENOUS at 05:34

## 2018-01-14 RX ADMIN — VITAMIN D, TAB 1000IU (100/BT) 4000 UNITS: 25 TAB at 09:14

## 2018-01-14 RX ADMIN — OXCARBAZEPINE 300 MG: 300 TABLET, FILM COATED ORAL at 20:42

## 2018-01-14 RX ADMIN — CEFTRIAXONE 1 G: 10 INJECTION, POWDER, FOR SOLUTION INTRAVENOUS at 13:55

## 2018-01-14 RX ADMIN — CLONIDINE HYDROCHLORIDE 0.1 MG: 0.1 TABLET ORAL at 20:43

## 2018-01-14 RX ADMIN — VITAMIN D, TAB 1000IU (100/BT) 4000 UNITS: 25 TAB at 20:43

## 2018-01-14 RX ADMIN — ACETAMINOPHEN 650 MG: 325 TABLET, FILM COATED ORAL at 20:43

## 2018-01-14 RX ADMIN — Medication 150 MG: at 20:43

## 2018-01-14 ASSESSMENT — ACTIVITIES OF DAILY LIVING (ADL): ADLS_ACUITY_SCORE: 36

## 2018-01-14 ASSESSMENT — PAIN DESCRIPTION - DESCRIPTORS
DESCRIPTORS: ACHING
DESCRIPTORS: ACHING

## 2018-01-14 NOTE — PROGRESS NOTES
[facility]  Infectious Disease Progress Note          Assessment and Plan:    ANTONINO Jade is a 72 year old female who was admitted on 1/11/2018.      Impression:  1.  A 72-year-old female well known to us from prior admissions, admitted with mental status changes, UTI being suspected but patient afebrile, normal wbc.   2.  History of Recurrent urinary tract infections, chronic Johnson catheter in place, has had vancomycin-resistant enterococcus and extended spectrum beta lactamase in the past.   3.  Diabetes mellitus. Her blood glucose level per EMS was 28.   4.  Prior renal transplant, on chronic immunosuppressant therapy without major immunosuppressing infections, renal function slightly abnormal, but stable.   5.  Sulfa allergy and listed quinolone allergy, quinolones were challenged in past successfully. Currently on Meropenem.  6. History of C diff.           RECOMMENDATIONS:   1. Procalcitonin  negative. No fever, no leucocytosis.   2. Multiple antibiotics for possible UTI` s in past might have been a cause of MDR and C diff so trying to minimize  3. On Ceftriaxone , covers UTI            Interval History:   Intubated, no fever UC pansens e coli              Medications:       LORazepam  1 mg Intravenous Once     cefTRIAXone  1 g Intravenous Q24H     LORazepam  1 mg Intravenous Once     chlorhexidine  15 mL Mouth/Throat Q12H     famotidine  20 mg Intravenous Q12H     insulin aspart  1-6 Units Subcutaneous Q4H     aspirin  81 mg Oral Daily     cloNIDine  0.1 mg Oral BID     labetalol  150 mg Oral BID     levothyroxine  50 mcg Oral Daily     pravastatin  10 mg Oral Daily     predniSONE  5 mg Oral Daily     tacrolimus  2 mg Oral BID     cholecalciferol  4,000 Units Oral BID     sodium chloride (PF)  3 mL Intracatheter Q8H     enoxaparin  40 mg Subcutaneous Q24H                  Physical Exam:   Blood pressure 180/65, pulse 76, temperature 98.9  F (37.2  C), temperature source Oral, resp. rate 20,  "height 1.676 m (5' 6\"), weight 108.2 kg (238 lb 8.6 oz), SpO2 98 %, not currently breastfeeding.  Wt Readings from Last 2 Encounters:   01/14/18 108.2 kg (238 lb 8.6 oz)   12/23/17 108 kg (238 lb)     Vital Signs with Ranges  Temp:  [98.1  F (36.7  C)-99.8  F (37.7  C)] 98.9  F (37.2  C)  Heart Rate:  [68-92] 71  Resp:  [20-22] 20  BP: ()/(46-87) 180/65  FiO2 (%):  [40 %] 40 %  SpO2:  [96 %-100 %] 98 %    Constitutional: Intubated, ICU   Lungs: Clear to auscultation bilaterally, no crackles or wheezing   Cardiovascular: Regular rate and rhythm, normal S1 and S2, and no murmur noted   Abdomen: Normal bowel sounds, soft, non-distended, non-tender   Skin: No rashes, no cyanosis, no edema   Other:           Data:   All microbiology laboratory data reviewed.  Recent Labs   Lab Test  01/14/18   0353  01/13/18   0535  01/12/18   0648   WBC  5.7  5.4  5.4   HGB  9.9*  11.6*  11.7   HCT  30.7*  35.0  35.2   MCV  92  91  91   PLT  122*  119*  142*     Recent Labs   Lab Test  01/14/18   0353  01/13/18   0535  01/12/18   0648   CR  0.88  0.95  1.06*     Recent Labs   Lab Test  05/25/16   2116   SED  53*     Recent Labs   Lab Test  01/11/18   1614  01/11/18   1530  12/19/17   2233  12/19/17   1900  12/19/17   1620  12/19/17   1619  12/19/17   1602  11/13/17   1714  11/13/17   1646   CULT  No growth after 3 days  >100,000 colonies/mL  Escherichia coli  *  No growth after 3 days  No MRSA isolated  No growth after 24 days  No growth  No growth  No growth  No growth  >100,000 colonies/mL  Candida albicans / dubliniensis  Candida albicans and Candida dubliniensis are not routinely speciated  Susceptibility testing not routinely done  *       "

## 2018-01-14 NOTE — PLAN OF CARE
Problem: Patient Care Overview  Goal: Plan of Care/Patient Progress Review  OT: Orders rec'd and chart reviewed. Pt admitted due AMS and epilepticus. Spoke with PT, who reports pt is mainly bed bound at home and requires a use of christiana lift for transfers. Pt receives home OT and will defer inpt OT to continued home OT when return home, OT orders completed.

## 2018-01-14 NOTE — PLAN OF CARE
Problem: Patient Care Overview  Goal: Plan of Care/Patient Progress Review  Outcome: Improving  Neuro: alert and oriented X4. C/o chronic low back pain relieved with repositioning.  CV: SR, BP within limits  Pulm: RA, LS dim at bases, productive cough  GI/: tolerating PO diabetic diet. Johnson with good UOP  Skin: pressure injury coccyx, skin tear near right arm pit. Bruises present  Lines: PIV left hand infusing with MIV NS @ 100ml/hr  Restraints: NA  Plan: transfer to floor.  updated by phone about POC. Afebrile, VSS. Was up in the chair with lift for 1 hour, tolerated well.

## 2018-01-14 NOTE — PROGRESS NOTES
Renal Medicine Progress Note                                Gem Jade MRN# [id number]   Age: 72 year old YOB: 1945   Date of Admission: 1/11/2018 Hospital LOS: 3                  Assessment/Plan:     72-year-old female admitted through the emergency room on 01/11/2018 in the setting   of altered mental status    Evaluation and assessment in the setting of known end-stage renal disease, status post   previous renal transplant and modest electrolyte abnormalities      1.  ESRD  2.  LDKT    -baseline creatinine 1.0 mg/dl range   -sclerosis and TI scarring by allograft biopsy  3.  Immunocompromised host  4.  Acute Kidney Injury   -resolved  5.  Hyponatremia   -corrected  6.  AMS   -vented    Renal function remains baseline  Continue current immunosuppressants      Interval History:     Extubated.  Lethargic.  Good UO.      ROS:     ROS unable    Medications and Allergies:     Reviewed    Physical Exam:     Vitals were reviewed  Patient Vitals for the past 8 hrs:   BP Temp Temp src Heart Rate SpO2 Weight   01/14/18 0800 167/62 98.9  F (37.2  C) Oral 72 97 % -   01/14/18 0700 160/63 - - 75 96 % -   01/14/18 0601 - 98.4  F (36.9  C) - - - -   01/14/18 0600 128/47 - - 71 97 % 108.2 kg (238 lb 8.6 oz)   01/14/18 0500 154/57 99.8  F (37.7  C) Axillary 70 97 % -   01/14/18 0400 140/48 - - 68 96 % -   01/14/18 0300 124/48 - - 73 100 % -   01/14/18 0200 152/58 - - 71 97 % -     I/O last 3 completed shifts:  In: 2963.33 [P.O.:650; I.V.:2313.33]  Out: 1630 [Urine:1630]    Vitals:    01/11/18 1436 01/14/18 0600   Weight: 108 kg (238 lb) 108.2 kg (238 lb 8.6 oz)       GENERAL: lethargic  HEENT: ETT  RESP:  clear anteriorly  CV: RRR, normal S1 S2  ABDOMEN: soft, nontender, no HSM or masses and bowel sounds normal  MS: no clubbing, cyanosis   SKIN: clear without significant rashes or lesions  EXT: warm, no edema    Data:       Recent Labs  Lab 01/14/18  0353 01/13/18  0535 01/12/18  0648 01/11/18  1502     136 132* 128*   POTASSIUM 3.6 3.9 4.3 4.9   CHLORIDE 107 104 100 95   CO2 23 24 26 27   ANIONGAP 9 8 6 6   GLC 63* 75 131* 119*   BUN 12 16 20 24   CR 0.88 0.95 1.06* 1.20*   GFRESTIMATED 63 58* 51* 44*   GFRESTBLACK 77 70 62 54*   ROGER 7.6* 8.0* 8.2* 8.5         Recent Labs  Lab 01/14/18  0353      POTASSIUM 3.6   CHLORIDE 107   CO2 23   ANIONGAP 9   GLC 63*   BUN 12   CR 0.88   GFRESTIMATED 63   GFRESTBLACK 77   ROGER 7.6*     Recent Labs   Lab Test  01/14/18   0353  01/13/18   0535  01/12/18   0648  01/11/18   1502  01/11/18   1331  12/28/17   1230  12/24/17   0605  12/23/17   0655  12/21/17   0452  12/20/17   0525   CR  0.88  0.95  1.06*  1.20*  1.16*  0.96  1.29*  1.10*  1.11*  1.13*       Recent Labs  Lab 01/14/18  0353 01/13/18  0535 01/12/18  0648 01/11/18  1502   HGB 9.9* 11.6* 11.7 11.9         G Dimitry Phillips    [facility] - Nephrology  [phone]

## 2018-01-14 NOTE — PROGRESS NOTES
Neuroscience Intensive Care Progress Note    2018    Assessment/Plan    Gem Jade is a 72 year old h/o neurogenic bladder, recurrent UTI, renal transplant () on immune suppression admitted 2018 with suspected status epilepticus. She was also found to be hypoglycemic to 28 that was corrected. Patient did get a loading dose of fosphenytoin initially.      - Her EEG is consistent with metabolic encephalopathy with triphasic waves. No epileptiform discharges seen.  - Patient is improving clinically  - MR brain no acute process  - Will sign off, please call with questions    24 hour events:  Extubated. Improved.     24 Hour Vital Signs Summary:  Temperatures:  Current - Temp: 97.9  F (36.6  C); Max - Temp  Av.7  F (37.1  C)  Min: 97.9  F (36.6  C)  Max: 99.8  F (37.7  C)  Respiration range: Resp  Av.2  Min: 16  Max: 22  Pulse range: No Data Recorded  Blood pressure range: Systolic (24hrs), Av , Min:91 , Max:180   ; Diastolic (24hrs), Av, Min:35, Max:74    Pulse oximetry range: SpO2  Av.1 %  Min: 94 %  Max: 100 %    Ventilator Settings  Ventilation Mode: CPAP/PS  FiO2 (%): 40 %  Rate Set (breaths/minute): 12 breaths/min  Tidal Volume Set (mL): 480 mL  PEEP (cm H2O): 5 cmH2O  Pressure Support (cm H2O): 5 cmH2O  Oxygen Concentration (%): 40 %  Resp: 16      Intake/Output Summary (Last 24 hours) at 18 1651  Last data filed at 18 1600   Gross per 24 hour   Intake          4313.33 ml   Output             1990 ml   Net          2323.33 ml       BP - Mean:  [] 89    Current Medications:    OXcarbazepine  300 mg Oral BID     insulin aspart  1-7 Units Subcutaneous TID AC     insulin aspart  1-5 Units Subcutaneous At Bedtime     labetalol  10 mg Intravenous Q6H     LORazepam  1 mg Intravenous Once     cefTRIAXone  1 g Intravenous Q24H     LORazepam  1 mg Intravenous Once     aspirin  81 mg Oral Daily     cloNIDine  0.1 mg Oral BID     labetalol  150 mg Oral BID      "levothyroxine  50 mcg Oral Daily     pravastatin  10 mg Oral Daily     predniSONE  5 mg Oral Daily     tacrolimus  2 mg Oral BID     cholecalciferol  4,000 Units Oral BID     sodium chloride (PF)  3 mL Intracatheter Q8H     enoxaparin  40 mg Subcutaneous Q24H       PRN Medications:  fentaNYL, midazolam, - MEDICATION INSTRUCTIONS -, senna-docusate **OR** senna-docusate, guaiFENesin, lidocaine (buffered or not buffered), lidocaine 4%, sodium chloride (PF), acetaminophen, acetaminophen, polyethylene glycol, bisacodyl, ondansetron **OR** ondansetron, cloNIDine, glucose **OR** dextrose **OR** glucagon    Infusions:    - MEDICATION INSTRUCTIONS -         Allergies   Allergen Reactions     Bactrim Ds [Sulfamethoxazole W/Trimethoprim]      Creatinine level increased     Atorvastatin Calcium      myalgias, muscle aches     Codeine Nausea and Vomiting     Darvocet [Propoxyphene N-Apap] Nausea and Vomiting     Hydromorphone      Levofloxacin Other (See Comments) and Diarrhea     hallucinations     Morphine      Nausea and vomiting     Niaspan [Niacin] GI Disturbance     Flushing even at low dose, GI upset     Percocet [Oxycodone-Acetaminophen]      Vomiting after taking med couple of times     Vicodin [Hydrocodone-Acetaminophen] Nausea and Vomiting       Physical Examination:  /48  Pulse 76  Temp 97.9  F (36.6  C) (Oral)  Resp 16  Ht 1.676 m (5' 6\")  Wt 108.2 kg (238 lb 8.6 oz)  SpO2 95%  BMI 38.5 kg/m2  Alert, follows simple commands. PERRLA, EOMI, Face symmetric, no dysarthria. Moves all four extremities antigravity and localizes superficial touch.     Labs/Studies:  Recent Labs   Lab Test  01/14/18   0353  01/13/18   0535  01/12/18   0648   NA  139  136  132*   POTASSIUM  3.6  3.9  4.3   CHLORIDE  107  104  100   CO2  23  24  26   ANIONGAP  9  8  6   GLC  63*  75  131*   BUN  12  16  20   CR  0.88  0.95  1.06*   ROGER  7.6*  8.0*  8.2*   WBC  5.7  5.4  5.4   RBC  3.34*  3.83  3.89   HGB  9.9*  11.6*  11.7   PLT  " 122*  119*  142*       Recent Labs   Lab Test  11/06/17   1652  04/26/17   2338  04/26/17   0638   08/07/16   0750  08/06/16   1858   INR  1.09  1.03  1.06   < >  1.12  1.09   PTT  35   --    --    --   38*  41*    < > = values in this interval not displayed.           Recent Labs  Lab 01/13/18  1543 01/13/18  0430 01/13/18  0023   PH 7.36 7.41 7.50*   PCO2 41 39 31*   PO2 151* 135* 323*   HCO3 23 25 24   O2PER 40% 40 80           Imaging:  Reviewed.    Plan discussed with Dr. Oliver Quintero MD   Fellow

## 2018-01-14 NOTE — PROGRESS NOTES
Critical Care Progress Note      01/14/2018    Name: Gem Jade MRN#: [id number]   Age: 72 year old YOB: 1945     Hsptl Day# 3  ICU DAY #1    MV DAY #1             Problem List:   Active Problems:    Altered mental state    Status epilepticus (H)           Summary/Hospital Course:     Ms. Gem Jade is a 72 yof with a history of neurogenic bladder with chronic indwelling rico, recurrent UTIs, and kidney transplant (1999) maintained on tacrolimus and 5 mg prednisone who is admitted to the ICU in the setting of status epilepticus (NCSE).  Briefly, she was admitted to the hospital on 1/11 with altered mental status which was thought to be 2/2 hypoglycemia with glucose 28.  Her rico cathter was exchanged in the ED given dirty UA and long history of recurrent UTIs.  She was tested for influenza and found to be negative.  CT head was obtained and negative for acute pathology.  She was admitted to the floor and underwent ID work up and consult and well as neuro consult for persistently altered mental status.  Infection was felt to be unlikely due to normal WBC, normal procal and absence of fevers. Altered mental status was persistent, EEG was obtained and she was found to be status epilepticus necessitating transfer to the ICU for further cares.       On arrival to the ICU her eyes were open but she was not tracking.  She withdrew to nailbed pressure but was otherwise unresponsive.  The decision was made to intubate for airway protection.  Anesthesia was called and the patient was intubated without difficulty.  She remains hemodynamically stable.      Assessment and plan :     Gem Jade IS a 72 year old female admitted on 1/11/2018 for acute encephalopathy requiring intubation - concern for non convulsive status epilepticus.   I have personally reviewed the daily labs, imaging studies, cultures and discussed the case with referring physician and consulting physicians.     My assessment and  plan by system for this patient is as follows:    Neurology/Psychiatry:   1. Acute encephalopathy -initial presumption of non convulsive status epilepticus -EEG more suggestive of metabolic encephalopathy -  Now resolved  2. Analgesia/Anxiety  Plan  -Holding home baclofen gabapentin Trileptal and tramadol    Cardiovascular:   1.Hemodynamics -appropriate  2.Rhythm -sinus  3. Ischemia -no evidence  Plan   -Continue prior to admission aspirin clonidine and labetalol pravastatin  -Consider  as needed's if systolic greater than 180    Pulmonary/Ventilator Management:   1.  Acute hypoxic respiratory failure secondary to encephalopathy - resolved   Plan  - incentive spirometry  - supplemental O2      GI and Nutrition :   1. No active issues   2. NPO  Plan  - advance diet  - aspiration precautions      Renal/Fluids/Electrolytes:   1. CKD - Stage IV CKD >  ESRD , Hx of renal transplant   2. Neurogenic bladder with chronic rico (exchanged 1/11)  3. Electrolyte abnormality -   3. Acid/base status  4. Volume status  Plan  - continue tacrolimus and prednisone  - tacro levels per renal/pharmacy   - renal following   - monitor function and electrolytes as needed with replacement per ICU protocols.  - generally avoid nephrotoxic agents such as NSAID, IV contrast unless specifically required  - adjust medications as needed for renal clearance  - follow I/O's as appropriate.    Infectious Disease:   1.  Chronic recurrent urinary tract infections with chronic indwelling Rico, in an immunocompromised host ; ID following, culture thus far with pansensitive E. Coli but hx of MRDO  2. Hx of VRE and ESBL  Plan  - FUP cultures  - DC meropenem > ceftriaxone   - ID following     Endocrine:   1. Admission hypoglycemia - ? Culprit, sugars stable  2. Hypothyroidism  3. Hyperparathyroidism  Plan  - ICU insulin protocol, goal sugar <180  - hold off long acting insulin-   - continue home levothyroxine     Hematology/Oncology:   1. Chronic Anemia,  no signs, symptoms of active blood loss  2. Hx of PE  Plan  - serial CBC coags  - DVT prophylaxis      MSKL/Rheum:  1. No active issues   Plan  - wound care per unit routine       IV/Access:   1. Venous access - PICC  2. Arterial access -  none  Plan  - central access required and necessary      ICU Prophylaxis:   1. DVT: LMWH  2. VAP: HOB 30 degrees, chlorhexidine rinse  3. Stress Ulcer: PPI  4. Restraints: Nonviolent soft two point restraints required and necessary for patient safety and continued cares and good effect as patient continues to pull at necessary lines, tubes despite education and distraction. Will readdress daily.   5. Wound care - per unit routine   6. Feeding - NPO  7. Family Update:at bedside   8. Disposition - ICU        Key goals for next 24 hours:   1. Advance PO  2. Continue abx -  3. PT  4. Transfer to floor, signed out to Hospitalist             Interim History:     - EEG negative  - MRI no acute pathology  - extubated   - mental status clearing         Key Medications:       LORazepam  1 mg Intravenous Once     cefTRIAXone  1 g Intravenous Q24H     LORazepam  1 mg Intravenous Once     chlorhexidine  15 mL Mouth/Throat Q12H     famotidine  20 mg Intravenous Q12H     insulin aspart  1-6 Units Subcutaneous Q4H     aspirin  81 mg Oral Daily     cloNIDine  0.1 mg Oral BID     labetalol  150 mg Oral BID     levothyroxine  50 mcg Oral Daily     pravastatin  10 mg Oral Daily     predniSONE  5 mg Oral Daily     tacrolimus  2 mg Oral BID     cholecalciferol  4,000 Units Oral BID     sodium chloride (PF)  3 mL Intracatheter Q8H     enoxaparin  40 mg Subcutaneous Q24H       NaCl 100 mL/hr at 01/14/18 0218     propofol (DIPRIVAN) infusion Stopped (01/13/18 0800)     - MEDICATION INSTRUCTIONS -                 Physical Examination:   Temp:  [98.1  F (36.7  C)-99.8  F (37.7  C)] 98.9  F (37.2  C)  Heart Rate:  [68-92] 71  Resp:  [20-22] 20  BP: ()/(46-87) 180/65  FiO2 (%):  [40 %] 40 %  SpO2:  [96  %-100 %] 98 %    Intake/Output Summary (Last 24 hours) at 01/13/18 0758  Last data filed at 01/13/18 0600   Gross per 24 hour   Intake          3276.76 ml   Output             1400 ml   Net          1876.76 ml     Wt Readings from Last 4 Encounters:   01/14/18 108.2 kg (238 lb 8.6 oz)   12/23/17 108 kg (238 lb)   11/17/17 101.7 kg (224 lb 1.6 oz)   11/08/17 103.5 kg (228 lb 2.8 oz)     BP - Mean:  [] 108  Ventilation Mode: CPAP/PS  FiO2 (%): 40 %  Rate Set (breaths/minute): 12 breaths/min  Tidal Volume Set (mL): 480 mL  PEEP (cm H2O): 5 cmH2O  Pressure Support (cm H2O): 5 cmH2O  Oxygen Concentration (%): 40 %  Resp: 20    Recent Labs  Lab 01/13/18  1543 01/13/18  0430 01/13/18  0023   PH 7.36 7.41 7.50*   PCO2 41 39 31*   PO2 151* 135* 323*   HCO3 23 25 24   O2PER 40% 40 80       GEN: alert oriented, much brighter    HEENT: head ncat, sclera anicteric, OP patent, trachea midline   PULM: unlabored moving good air, decreased at bases  CV/COR: RRR S1S2 no gallop,  No rub, no murmur  ABD: obese soft nontender, hypoactive bowel sounds, no mass  EXT:  2+ edema,  warm  NEURO: follows simple commands, inattentive weak gag, grossly intact  SKIN: no obvious rash  LINES: clean, dry intact         Data:   All data and imaging reviewed     ROUTINE ICU LABS (Last four results)  CMP    Recent Labs  Lab 01/14/18  0353 01/13/18  0535 01/12/18  0648 01/11/18  1502    136 132* 128*   POTASSIUM 3.6 3.9 4.3 4.9   CHLORIDE 107 104 100 95   CO2 23 24 26 27   ANIONGAP 9 8 6 6   GLC 63* 75 131* 119*   BUN 12 16 20 24   CR 0.88 0.95 1.06* 1.20*   GFRESTIMATED 63 58* 51* 44*   GFRESTBLACK 77 70 62 54*   ROGER 7.6* 8.0* 8.2* 8.5   PROTTOTAL 6.1*  --   --  8.2   ALBUMIN 2.3*  --   --  3.1*   BILITOTAL 0.4  --   --  0.3   ALKPHOS 67  --   --  86   AST 14  --   --  24   ALT 11  --   --  16     CBC    Recent Labs  Lab 01/14/18  0353 01/13/18  0535 01/12/18  0648 01/11/18  1502   WBC 5.7 5.4 5.4 6.1   RBC 3.34* 3.83 3.89 3.92   HGB 9.9*  11.6* 11.7 11.9   HCT 30.7* 35.0 35.2 35.5   MCV 92 91 91 91   MCH 29.6 30.3 30.1 30.4   MCHC 32.2 33.1 33.2 33.5   RDW 14.4 14.2 14.1 13.8   * 119* 142* 166     INRNo lab results found in last 7 days.  Arterial Blood Gas    Recent Labs  Lab 01/13/18  1543 01/13/18  0430 01/13/18  0023   PH 7.36 7.41 7.50*   PCO2 41 39 31*   PO2 151* 135* 323*   HCO3 23 25 24   O2PER 40% 40 80       All cultures:    Recent Labs  Lab 01/11/18  1614 01/11/18  1530   CULT No growth after 3 days  >100,000 colonies/mLEscherichia coli* No growth after 3 days     Recent Results (from the past 24 hour(s))   MR Brain w/o & w Contrast    Narrative    MRI BRAIN WITHOUT AND WITH CONTRAST January 13, 2018 11:50 AM    HISTORY: Altered mental status.    TECHNIQUE: Multiplanar, multisequence MRI of the brain without and  with 11mL Gadavist.    COMPARISON: Head CT 1/11/2018. Brain MR 9/2/2016.    FINDINGS: There is no evidence of hemorrhage, mass, acute infarct, or  anomaly. There is generalized atrophy of the brain. White matter T2  hyperintensities are seen in the cerebral hemispheres consistent with  sequelae of small vessel ischemic disease. There are no gadolinium  enhancing lesions.    Mucosal thickening throughout the paranasal sinuses. There are  air-fluid layers in the maxillary and sphenoid sinuses which are not  unexpected in the setting of intubation. Bilateral mastoid effusions  left greater than right. The arteries at the base of the brain and the  dural venous sinuses appear patent.       Impression    IMPRESSION:    1. No evidence of acute infarct, mass, hemorrhage, or herniation.  2. There is generalized atrophy of the brain. White matter changes are  present in the cerebral hemispheres that are consistent with small  vessel ischemic disease in this age patient.        ANABELA GONGORA MD         Billing: L3.

## 2018-01-14 NOTE — PLAN OF CARE
Problem: Patient Care Overview  Goal: Plan of Care/Patient Progress Review  PT-Patient seen for initial eval. Patient is awake and alert. She was able to provide information about home situation. It was a little difficulty to sort out how long it has been since patient stood but it appears it has been about 5 years. She reports she had 2 compression fractures and basically stayed in bed until recently when her  and son were taught how to use a home christiana lift. Patient reports currently it takes 2 people to use the lift at home but they are working toward her  doing it alone. She had been receiving both HHPT and OT but PT has stopped and OT has continued with home education and treatment.    Discharge Planner PT   Patient plan for discharge: Home with resuming HHOT  Current status: Patient can lift both her arms against gravity and can assist with rolling by reaching with her arms. She has very minimal movement of LE's. She needed max assist of 2 to place sling under her and to complete transfer to chair using overhead lift. This is patients baseline so will defer PT and OT in the hospital and have nursing continue getting patient up to chair using the overhead lift.   Barriers to return to prior living situation: Barriers have been addressed in the past and patients family has moved her bedroom to the first floor and now have a christiana lift.   Recommendations for discharge: Return home with  and resume home OT to continue with instruction on using christiana lift.   Rationale for recommendations: Patient is currently at her baseline.        Entered by: Kelsie Medel 01/14/2018 12:52 PM

## 2018-01-14 NOTE — PLAN OF CARE
Problem: Patient Care Overview  Goal: Plan of Care/Patient Progress Review  Outcome: Therapy, progress towards functional goals is fair  Resting comfortably with low grade fever this morning (99.8) tylenol given. Tolerating turns and able to swallow liquids. Blood sugar 62 this morning and improved with apple juice and cracker. Continuing to monitor. Johnson patent. Anticipating transfer to sub acute care today

## 2018-01-14 NOTE — PROGRESS NOTES
[facility]  Hospitalist Progress Note  Socrates Baptiste MD  01/14/2018    Assessment & Plan   Gem Jade is a 72 year old female with past medical hx who is admitted on 1/11/2018. She has a hx of neurogenic bladder with indwelling rico cath, recurrent UTI including ESBL klebsiella, ESRD s/p renal transplant in 1999 with chronic immunosuppressive therapy, HTN, hyperlipidemia, and recent hospital admission in 12/2017 due to UTI with sepsis presenting with altered mental status.  Per report,she was noted to have BG of 28 by EMS. In the ED, UA was noted to be abnormal. It was felt her encephalopathy was due to hypoglycemia and UTI. However, due to persistent AMS, EEG was obtained which was initially concerning for status epileptics. As a result she was transferred to ICU on 1/12 and intubated for airway protection.  Neurology was consulted and monitored with EEG, which revealed encephalopathy, but no seizure activity. Neuro felt this is unlikely seizure thus did not recommend antiepileptic.  On 1/13, patient was noted to be alert and following commands, was able to successfully extubate in the afternoon. Her mental status was clear and remained stable from resp stand point on 1/14. She is now transferred back to hospitalist service for continued care. jk    Urinary tract infection, cathter associated  Patient with hx of indwelling rico. 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  Most recent hospital admission in 12/2017 due to sepsis due to UTI.  Empirically started on Meropenem due to abnormal UA after presenting with AMS pending culture results.   - urine culture now growing E. Coli  - Meropenem changed to Ceftriaxone on 1/13 based on culture results  - rico is exchanged on admission  - continue antibiotics and monitor fever curve    Acute metabolic encephalopathy, secondary to urinary tract infection,catheter associated and possible hypoglycemia   Presented  with AMS. She was noted to have BG of 28 by EMS. In the ED, UA was obtained which was abnormal and rico was exchanged. Work up in the ED, influenza was negative.  A CT of the head was completed showing no acute intracranial hemorrhage, midline shift, or acute infarct, stable-appearing atrophy changes, small fluid at the bilateral maxillary sinuses, along with ethmoid sinus mucosal thickening, per report.  Lactic acid was also normal at 0.7 on admission. Initially there was concern for hypoglycemia as the cause but remained altered despite improved BG and treatment of UTI.  EEG was obtained which was initially concerning for status epileptics. She was transferred to ICU on 1/12 and intubated for airway protection. EEG monitoring per neurology report showed encephalopathy but no seizure activity, thus felt AMS to be due to metabolic rather than seizure. Did not recommend AED. MRI was negative for acute abnormality.  Patient gradually improved with tx of UTI and was successfully extubated on 1/13.  Her mental status has cleared.    - treating UTI as above  - restart Trileptal BID today  - consider resuming PTA Neurontin at lower dose ? 300mg BID in the next 1-2 days if remains stable  - continue to hold baclofen prn and tramadol prn        Acute hypoxic respiratory failure, secondary to acute encephalopathy.   Patient is not requiring oxygen at baseline according to note review.  The patient did have a chest x-ray on admission showing no acute infiltrates, and report of no acute disease. She was intubate from 1-12-1/13 for airway protection. Currently saturating well on RA after mental status improved.       Chronic renal failure, stage 3-4, status post renal transplant in 1999, with chronic immunosuppression, as well as hyperparathyroidism  Initial creatinine is 1.20 on admission.  The patient has been followed by nephrology in the past.  She is maintained on prednisone and tacrolimus.  Her last tacrolimus level was  on January 2, 2018 and returned at 3.0.  - Continue tacrolimus and steroids.  - appreciate nephrology      hypertension, hyperlipidemia  - Continue prior to admission aspirin 81 mg daily, clonidine, labetalol and statin  - prn IV labetalol for SBP >180    Diabetes mellitus type 2, with a history of gastroparesis, retinopathy, polyneuropathy  Patient with previous hemoglobin A1c is 6.7 on December 19, 2017.  Patient was noted to have a low blood sugar by EMS upon initial evaluation according to documentation in the chart review. Since admission blood sugars have been well controlled  - Hold long-acting insulin at this time and may not need to continue this given her A1c and her presentation with hypoglycemia  - continue with SSI for now      Anemia of chronic disease, baseline 10-12  Patient with no obvious sign of blood loss on admission.  Hemoglobin on admission is normal range 11.9  - Monitor      Physical deconditioning, functional impairment, secondary to physical debility, polyneuropathy, and history of Charcot foot:   - PT consult and SW for dispo planning  - seems like patient has home care      History of bilateral pulmonary emboli, postop after knee replacement in the past  This is elucidated upon chart review, listed in past medical history.      Hypothyroidism  Patient maintained levothyroxine prior to admission.  Last TSH was obtained on December 19, 2017 and returning 1.34.  - Continue prior to admission levothyroxine     DVT Prophylaxis: Enoxaparin (Lovenox) SQ  Code Status: Full Code     Disposition: Expected discharge in the next 1-2 days.      Interval History   Feels back to her baseline. She denies chest pain or shortness of breath. Reports intermittent cough. Afebrile. Breathing remains stable after extubation yesterday and currently on RA.    -Data reviewed today: I reviewed all new labs and imaging over the last 24 hours. I personally reviewed no images or EKG's today.    Physical Exam   Heart  "Rate: 69, Blood pressure 145/54, pulse 76, temperature 98.9  F (37.2  C), temperature source Oral, resp. rate 16, height 1.676 m (5' 6\"), weight 108.2 kg (238 lb 8.6 oz), SpO2 96 %, not currently breastfeeding.  Vitals:    01/11/18 1436 01/14/18 0600   Weight: 108 kg (238 lb) 108.2 kg (238 lb 8.6 oz)     Vital Signs with Ranges  Temp:  [98.1  F (36.7  C)-99.8  F (37.7  C)] 98.9  F (37.2  C)  Heart Rate:  [65-80] 69  Resp:  [16-22] 16  BP: ()/(35-83) 145/54  FiO2 (%):  [40 %] 40 %  SpO2:  [94 %-100 %] 96 %  I/O's Last 24 hours  I/O last 3 completed shifts:  In: 4013.33 [P.O.:900; I.V.:3113.33]  Out: 2080 [Urine:2080]    Constitutional: Alert, awake and no apparent distress   Respiratory: Clear to auscultation bilaterally  Cardiovascular: Regular rate and rhythm  GI: soft, and non-tender  Ext: no LE edema bilaterally      Medications   All medications I am responsible for were reviewed.    NaCl 100 mL/hr at 01/14/18 0218     - MEDICATION INSTRUCTIONS -         LORazepam  1 mg Intravenous Once     cefTRIAXone  1 g Intravenous Q24H     LORazepam  1 mg Intravenous Once     insulin aspart  1-6 Units Subcutaneous Q4H     aspirin  81 mg Oral Daily     cloNIDine  0.1 mg Oral BID     labetalol  150 mg Oral BID     levothyroxine  50 mcg Oral Daily     pravastatin  10 mg Oral Daily     predniSONE  5 mg Oral Daily     tacrolimus  2 mg Oral BID     cholecalciferol  4,000 Units Oral BID     sodium chloride (PF)  3 mL Intracatheter Q8H     enoxaparin  40 mg Subcutaneous Q24H        Data     Recent Labs  Lab 01/14/18  0353 01/13/18  0535 01/12/18  0648 01/11/18  1502   WBC 5.7 5.4 5.4 6.1   HGB 9.9* 11.6* 11.7 11.9   MCV 92 91 91 91   * 119* 142* 166    136 132* 128*   POTASSIUM 3.6 3.9 4.3 4.9   CHLORIDE 107 104 100 95   CO2 23 24 26 27   BUN 12 16 20 24   CR 0.88 0.95 1.06* 1.20*   ANIONGAP 9 8 6 6   ROGER 7.6* 8.0* 8.2* 8.5   GLC 63* 75 131* 119*   ALBUMIN 2.3*  --   --  3.1*   PROTTOTAL 6.1*  --   --  8.2 "   BILITOTAL 0.4  --   --  0.3   ALKPHOS 67  --   --  86   ALT 11  --   --  16   AST 14  --   --  24       No results found for this or any previous visit (from the past 24 hour(s)).

## 2018-01-14 NOTE — PROGRESS NOTES
01/14/18 1140   Quick Adds   Type of Visit Initial PT Evaluation   Living Environment   Lives With spouse   Living Arrangements house   Number of Stairs to Enter Home 1   Number of Stairs Within Home 7   Stair Railings at Home inside, present on right side   Living Environment Comment Patient reports after last admission, her son and nephew had surprised her and had moved her bed down to the main floor now. They have 2 christiana lifts at home. Patients bathroom is up the 7 steps but she doesn't use because she has a rico and her  helps her with BM. She only goes out of home when coming into the hospital.    Self-Care   Usual Activity Tolerance poor  (Essentially bedbound. )   Current Activity Tolerance poor   Regular Exercise no   Equipment Currently Used at Home (Hospital bed and 2 christiana lifts. )   Activity/Exercise/Self-Care Comment Patient reports she has grab bars and a tub bench but hasn't been able to use this equipment for about 5 years.    Functional Level Prior   Ambulation 4-->completely dependent   Transferring 4-->completely dependent   Toileting 4-->completely dependent   Bathing 4-->completely dependent   Dressing 4-->completely dependent   Eating (Reported she feeds herself after set up. )   Communication 0-->understands/communicates without difficulty   Cognition 0 - no cognition issues reported   Fall history within last six months no   Which of the above functional risks had a recent onset or change? none   Prior Functional Level Comment Apparently has been about 5 years since she has been able to assist much with her mobility   General Information   Onset of Illness/Injury or Date of Surgery - Date 01/11/18   Referring Physician Grant Haas   Patient/Family Goals Statement Patients goal is to return home and resume OT.   Pertinent History of Current Problem (include personal factors and/or comorbidities that impact the POC) Patient admitted with AMS and was hypoglycemic.    Precautions/Limitations fall precautions;oxygen therapy device and L/min  (On 2 liters initially but nurse removed during eval. )   Weight-Bearing Status - LUE full weight-bearing   Weight-Bearing Status - RUE full weight-bearing   Weight-Bearing Status - LLE full weight-bearing   Weight-Bearing Status - RLE full weight-bearing   General Observations Pateint very talkative. Left foot everted and externally rotated. Old TKA scar on right.    Cognitive Status Examination   Orientation orientation to person, place and time   Level of Consciousness alert   Follows Commands and Answers Questions 100% of the time   Personal Safety and Judgment intact   Memory intact   Pain Assessment   Patient Currently in Pain No   Range of Motion (ROM)   ROM Comment Bilateral ankles with about 5 degrees total PF/DF, Right knee flex limited to about 15 degrees, left to about 50 degrees. BIlateral RU range appears functional but did not formally assess.    Strength   Strength Comments Patient able to lift both UE's against gravity and she reports she can feed herself. Patient has very minimal movement of either LE.    Bed Mobility   Bed Mobility Comments Max assist of 2 to roll.    Transfer Skills   Transfer Comments Transferred bed to chair using sling and overhead lift.    Clinical Impression   Criteria for Skilled Therapeutic Intervention no   PT Diagnosis Impaired functional independence   Influenced by the following impairments Minimal active movement either LE.    Functional limitations due to impairments Patient needs assist for all functional mobility but this is her baseline and she is already using a christiana lift at home so not inpatient therapy warranted at this time.    Clinical Presentation Stable/Uncomplicated   Clinical Decision Making (Complexity) Low complexity   Predicted Duration of Therapy Intervention (days/wks) Eval only   Anticipated Equipment Needs at Discharge (She has 2 christiana lifts and hospital bed already. )  "  Anticipated Discharge Disposition Home with Home Therapy   Risk & Benefits of therapy have been explained Yes   Patient, Family & other staff in agreement with plan of care Yes   Clinical Impression Comments Resuming home OT appropriate in order to continue to educate on use of equipment.    [facility]-PAC TM \"6 Clicks\"   2016, Trustees of [facility], under license to [employer].  All rights reserved.   6 Clicks Short Forms Basic Mobility Inpatient Short Form   [facility] AM-PAC  \"6 Clicks\" V.2 Basic Mobility Inpatient Short Form   1. Turning from your back to your side while in a flat bed without using bedrails? 2 - A Lot   2. Moving from lying on your back to sitting on the side of a flat bed without using bedrails? 1 - Total   3. Moving to and from a bed to a chair (including a wheelchair)? 1 - Total   4. Standing up from a chair using your arms (e.g., wheelchair, or bedside chair)? 1 - Total   5. To walk in hospital room? 1 - Total   6. Climbing 3-5 steps with a railing? 1 - Total   Basic Mobility Raw Score (Score out of 24.Lower scores equate to lower levels of function) 7   Total Evaluation Time   Total Evaluation Time (Minutes) 45     "

## 2018-01-15 ENCOUNTER — TELEPHONE (OUTPATIENT)
Dept: FAMILY MEDICINE | Facility: CLINIC | Age: 73
End: 2018-01-15

## 2018-01-15 VITALS
RESPIRATION RATE: 16 BRPM | OXYGEN SATURATION: 95 % | HEIGHT: 66 IN | WEIGHT: 226.9 LBS | DIASTOLIC BLOOD PRESSURE: 70 MMHG | SYSTOLIC BLOOD PRESSURE: 156 MMHG | BODY MASS INDEX: 36.47 KG/M2 | TEMPERATURE: 98.1 F | HEART RATE: 79 BPM

## 2018-01-15 LAB
ANION GAP SERPL CALCULATED.3IONS-SCNC: 11 MMOL/L (ref 3–14)
BUN SERPL-MCNC: 12 MG/DL (ref 7–30)
CALCIUM SERPL-MCNC: 8.4 MG/DL (ref 8.5–10.1)
CHLORIDE SERPL-SCNC: 104 MMOL/L (ref 94–109)
CO2 SERPL-SCNC: 21 MMOL/L (ref 20–32)
CREAT SERPL-MCNC: 0.82 MG/DL (ref 0.52–1.04)
ERYTHROCYTE [DISTWIDTH] IN BLOOD BY AUTOMATED COUNT: 14.3 % (ref 10–15)
FLUAV H1 2009 PAND RNA SPEC QL NAA+PROBE: NEGATIVE
FLUAV H1 RNA SPEC QL NAA+PROBE: NEGATIVE
FLUAV H3 RNA SPEC QL NAA+PROBE: NEGATIVE
FLUAV RNA SPEC QL NAA+PROBE: NEGATIVE
FLUBV RNA SPEC QL NAA+PROBE: NEGATIVE
GFR SERPL CREATININE-BSD FRML MDRD: 69 ML/MIN/1.7M2
GLUCOSE BLDC GLUCOMTR-MCNC: 107 MG/DL (ref 70–99)
GLUCOSE BLDC GLUCOMTR-MCNC: 109 MG/DL (ref 70–99)
GLUCOSE BLDC GLUCOMTR-MCNC: 192 MG/DL (ref 70–99)
GLUCOSE BLDC GLUCOMTR-MCNC: 92 MG/DL (ref 70–99)
GLUCOSE SERPL-MCNC: 96 MG/DL (ref 70–99)
HADV DNA SPEC QL NAA+PROBE: NEGATIVE
HADV DNA SPEC QL NAA+PROBE: NEGATIVE
HCT VFR BLD AUTO: 33.9 % (ref 35–47)
HGB BLD-MCNC: 10.8 G/DL (ref 11.7–15.7)
HMPV RNA SPEC QL NAA+PROBE: NEGATIVE
HPIV1 RNA SPEC QL NAA+PROBE: NEGATIVE
HPIV2 RNA SPEC QL NAA+PROBE: NEGATIVE
HPIV3 RNA SPEC QL NAA+PROBE: NEGATIVE
MCH RBC QN AUTO: 29.9 PG (ref 26.5–33)
MCHC RBC AUTO-ENTMCNC: 31.9 G/DL (ref 31.5–36.5)
MCV RBC AUTO: 94 FL (ref 78–100)
MICROBIOLOGIST REVIEW: NORMAL
PLATELET # BLD AUTO: 134 10E9/L (ref 150–450)
POTASSIUM SERPL-SCNC: 3.5 MMOL/L (ref 3.4–5.3)
RBC # BLD AUTO: 3.61 10E12/L (ref 3.8–5.2)
RHINOVIRUS RNA SPEC QL NAA+PROBE: NEGATIVE
RSV RNA SPEC QL NAA+PROBE: NEGATIVE
RSV RNA SPEC QL NAA+PROBE: NEGATIVE
SODIUM SERPL-SCNC: 136 MMOL/L (ref 133–144)
SPECIMEN SOURCE: NORMAL
TACROLIMUS BLD-MCNC: 4 UG/L (ref 5–15)
TME LAST DOSE: ABNORMAL H
WBC # BLD AUTO: 5.3 10E9/L (ref 4–11)

## 2018-01-15 PROCEDURE — 99207 ZZC APP CREDIT; MD BILLING SHARED VISIT: CPT | Performed by: INTERNAL MEDICINE

## 2018-01-15 PROCEDURE — 40000901 ZZH STATISTIC WOC PT EDUCATION, 0-15 MIN

## 2018-01-15 PROCEDURE — 25000132 ZZH RX MED GY IP 250 OP 250 PS 637: Mod: GY | Performed by: INTERNAL MEDICINE

## 2018-01-15 PROCEDURE — 25000132 ZZH RX MED GY IP 250 OP 250 PS 637: Mod: GY | Performed by: HOSPITALIST

## 2018-01-15 PROCEDURE — 27210995 ZZH RX 272: Performed by: INTERNAL MEDICINE

## 2018-01-15 PROCEDURE — 99239 HOSP IP/OBS DSCHRG MGMT >30: CPT | Performed by: INTERNAL MEDICINE

## 2018-01-15 PROCEDURE — 85027 COMPLETE CBC AUTOMATED: CPT | Performed by: INTERNAL MEDICINE

## 2018-01-15 PROCEDURE — 80048 BASIC METABOLIC PNL TOTAL CA: CPT | Performed by: INTERNAL MEDICINE

## 2018-01-15 PROCEDURE — 25000125 ZZHC RX 250: Performed by: HOSPITALIST

## 2018-01-15 PROCEDURE — A9270 NON-COVERED ITEM OR SERVICE: HCPCS | Mod: GY | Performed by: HOSPITALIST

## 2018-01-15 PROCEDURE — 80197 ASSAY OF TACROLIMUS: CPT | Performed by: INTERNAL MEDICINE

## 2018-01-15 PROCEDURE — A9270 NON-COVERED ITEM OR SERVICE: HCPCS | Mod: GY | Performed by: INTERNAL MEDICINE

## 2018-01-15 PROCEDURE — 25000128 H RX IP 250 OP 636: Performed by: INTERNAL MEDICINE

## 2018-01-15 PROCEDURE — G0463 HOSPITAL OUTPT CLINIC VISIT: HCPCS

## 2018-01-15 PROCEDURE — 25000131 ZZH RX MED GY IP 250 OP 636 PS 637: Mod: GY | Performed by: HOSPITALIST

## 2018-01-15 PROCEDURE — 36415 COLL VENOUS BLD VENIPUNCTURE: CPT | Performed by: INTERNAL MEDICINE

## 2018-01-15 PROCEDURE — 00000146 ZZHCL STATISTIC GLUCOSE BY METER IP

## 2018-01-15 RX ORDER — TRAMADOL HYDROCHLORIDE 50 MG/1
50 TABLET ORAL EVERY 8 HOURS PRN
Status: DISCONTINUED | OUTPATIENT
Start: 2018-01-15 | End: 2018-01-15 | Stop reason: HOSPADM

## 2018-01-15 RX ORDER — NALOXONE HYDROCHLORIDE 0.4 MG/ML
.1-.4 INJECTION, SOLUTION INTRAMUSCULAR; INTRAVENOUS; SUBCUTANEOUS
Status: DISCONTINUED | OUTPATIENT
Start: 2018-01-15 | End: 2018-01-15 | Stop reason: HOSPADM

## 2018-01-15 RX ORDER — CEPHALEXIN 500 MG/1
500 CAPSULE ORAL 4 TIMES DAILY
Qty: 28 CAPSULE | Refills: 0 | Status: ON HOLD | OUTPATIENT
Start: 2018-01-15 | End: 2018-01-25

## 2018-01-15 RX ORDER — GABAPENTIN 300 MG/1
900 CAPSULE ORAL 2 TIMES DAILY
Status: DISCONTINUED | OUTPATIENT
Start: 2018-01-15 | End: 2018-01-15 | Stop reason: HOSPADM

## 2018-01-15 RX ORDER — BACLOFEN 10 MG/1
10 TABLET ORAL 3 TIMES DAILY PRN
Status: DISCONTINUED | OUTPATIENT
Start: 2018-01-15 | End: 2018-01-15 | Stop reason: HOSPADM

## 2018-01-15 RX ADMIN — CLONIDINE HYDROCHLORIDE 0.1 MG: 0.1 TABLET ORAL at 09:20

## 2018-01-15 RX ADMIN — CEFTRIAXONE 1 G: 10 INJECTION, POWDER, FOR SOLUTION INTRAVENOUS at 14:29

## 2018-01-15 RX ADMIN — OXCARBAZEPINE 300 MG: 300 TABLET, FILM COATED ORAL at 09:20

## 2018-01-15 RX ADMIN — ONDANSETRON 4 MG: 4 TABLET, ORALLY DISINTEGRATING ORAL at 17:41

## 2018-01-15 RX ADMIN — Medication 150 MG: at 09:20

## 2018-01-15 RX ADMIN — BACLOFEN 10 MG: 10 TABLET ORAL at 11:46

## 2018-01-15 RX ADMIN — GABAPENTIN 900 MG: 300 CAPSULE ORAL at 11:46

## 2018-01-15 RX ADMIN — PRAVASTATIN SODIUM 10 MG: 10 TABLET ORAL at 09:20

## 2018-01-15 RX ADMIN — ASPIRIN 81 MG 81 MG: 81 TABLET ORAL at 09:21

## 2018-01-15 RX ADMIN — BACLOFEN 10 MG: 10 TABLET ORAL at 17:41

## 2018-01-15 RX ADMIN — LEVOTHYROXINE SODIUM 50 MCG: 50 TABLET ORAL at 09:19

## 2018-01-15 RX ADMIN — TACROLIMUS 2 MG: 1 CAPSULE ORAL at 09:20

## 2018-01-15 RX ADMIN — PREDNISONE 5 MG: 5 TABLET ORAL at 09:20

## 2018-01-15 RX ADMIN — VITAMIN D, TAB 1000IU (100/BT) 4000 UNITS: 25 TAB at 09:19

## 2018-01-15 ASSESSMENT — ACTIVITIES OF DAILY LIVING (ADL)
ADLS_ACUITY_SCORE: 31
ADLS_ACUITY_SCORE: 36
ADLS_ACUITY_SCORE: 31
ADLS_ACUITY_SCORE: 31
ADLS_ACUITY_SCORE: 36

## 2018-01-15 NOTE — PROGRESS NOTES
[facility] Nurse Inpatient Wound Assessment      Initial Assessment of wound(s) on pt's:   R armpit and Coccyx         Data:   Patient History:         Gem Jade is a 72 year old female with past medical hx who is admitted on 2018. She has a hx of neurogenic bladder with indwelling rico cath, recurrent UTI including ESBL klebsiella, ESRD s/p renal transplant in  with chronic immunosuppressive therapy, HTN, hyperlipidemia, and recent hospital admission in 2017 due to UTI with sepsis presenting with altered mental status.  Per report,she was noted to have BG of 28 by EMS. In the ED, UA was noted to be abnormal. It was felt her encephalopathy was due to hypoglycemia and UTI. However, due to persistent AMS, EEG was obtained which was initially concerning for status epileptics. As a result she was transferred to ICU on  and intubated for airway protection.  Neurology was consulted and monitored with EEG, which revealed encephalopathy, but no seizure activity. Neuro felt this is unlikely seizure thus did not recommend antiepileptic.  On , patient was noted to be alert and following commands, was able to successfully extubate in the afternoon. Her mental status was clear and remained stable from resp stand point on . She is now transferred back to hospitalist service for continued care.     Albino Score  Av.1  Min: 10  Max: 17          Positioning: Pillows    Mattress:  Atmos air       Moisture Management: rico catheter and incontinence protocol            Active Diet Order      Low Consistent CHO Diet       Recent Labs   Lab Test  01/15/18   0755  18   0353   18   0648   17   1652   17   1243   ALBUMIN   --   2.3*   --    --    < >  3.1*   < >  3.0*   HGB  10.8*  9.9*   < >  11.7   < >  12.7   < >  10.9*   INR   --    --    --    --    --   1.09   < >  1.01   WBC  5.3  5.7   < >  5.4   < >  13.5*   < >  5.0   A1C   --    --    --   6.3*   <  >   --    < >   --    CRP   --    --    --    --    --    --    --   7.5    < > = values in this interval not displayed.        Wound Assessment (location):   Coccyx    Wound History:  Pt is obese with deep skin folds and moist skin, chronic moist skin issues.     Pt has two  2cm x 2cm areas located to the Left and right of coccyx  That are superficial partial thickness lesions that are weeping scant serosanguinous drainage. Periwound skin approx 15cm x 20cm area to sacrum that has red erythema that is slow to bernadine, multiple scattered areas of epidermal peeling.   Wound Assessment (location):  Right Arm pit    Wound History:  Pt is obese with deep skin folds and moist skin, chronic moist skin issues.      Pt has 2 linear open areas one above one another that she reports she either scratches at or it crack open from time to time and drains. Wounds are superficial with partial thickness epidermal skin loss. Wounds are draining scant amt of serosanguinous drainage.       Pt has multiple excoriations that are scabbed over to chest and arms with chronic itching.              Intervention:     Patient's chart evaluated.      Wound(s) were assessed    Orders  Written    Supplies  Reviewed    Discussed plan of care with Patient and Nurse          Assessment:       Pt has moisture associated skin breakdown with yeast build up and rash. Skin has no active signs of infection. Will perform daily hygiene with particular focus to  groin folds, pannus, under arms and Coccyx. Will cleanse groin folds, pannus, breast tissue  with brandt cleanse, dust with baby powder and leave open to air. May apply interdry to wick moisture away from body. Will cover R arm pit and coccyx with Mepilex. Pt to Use light layers and keep minimal amt of bedding underneath pt. Practice strict PIP assistance. If pt remains in hospital she may benefit pulsate air mattress to promote dry skin and pressure relief, this was discussed with nurse and  "patient.         Plan:     Nursing to notify the Provider(s) and re-consult the [facility] Nurse if wound(s) deteriorate(s) or if the wound care plan needs reevaluation.    Plan of care for skin care located on, groin, pannus and breasts:     1. Cleanse with Jermaine cleanse. Spray onto skin and gently wipe away    2. Dust rash areas with Miconazole poweder    3. Leave skin exposed to air as much as possible. Tuck Interdry AG into deep crevices and leave a couple inches exposed to air to wick moisture away from body.     4. Perform skin care 2xdaily      Plan of care for wound located on R arm pit and sacrum:  1. Clean wound with MicroKlenz Spray, pat dry  2. Paint with No Sting Skin Prep (#[id number]) and allow to dry thoroughly  3. Press a Mepilex  Border Dressing (#[id number]) to R arm pit and  Sacral Dressing (PS#[id number]) to sacrum, making sure to conform nicely to skin curvatures  4. Time and date dressing change and hermelinda with a \"T\" for treatment of a wound  5. Reposition pt every 1 to 2 hours when in bed and hourly when up to the chair to relieve pressure and promote perfusion to tissue    NOTE** make sure to continue to assess under the Mepilex Dressing BID and document findings.  .     [facility] Nurse will return: Weekly and PRN              "

## 2018-01-15 NOTE — PROGRESS NOTES
Cross-cover.     Need telemetry bed evaluated. Patient remains in NSR, UTI with encephalopathy cleared. VSS good oxygenation.   D/c telemetry.

## 2018-01-15 NOTE — PLAN OF CARE
Problem: Patient Care Overview  Goal: Plan of Care/Patient Progress Review  A&O. Neuros intact except for chronic left sided weakness. VSS for high BP. Tele SR. Low carb diet. Repositioned in bed . PRN tylenol given for back pain. Coccyx red with abrasions.

## 2018-01-15 NOTE — DISCHARGE SUMMARY
[facility]    Discharge Summary  Hospitalist    Date of Admission:  1/11/2018  Date of Discharge:  1/15/2018  Discharging Provider: Victoriano Arellano DO    Discharge Diagnoses   1. Catheter associated UTI, acute on chronic  2. Metabolic encephalopathy 2/2 UTI and possibly hypoglycemia  3. Chronic renal failure, stage 3-4, s/p renal transplant in 1999 with chronic immunosuppression   4. DM type II  5. Hypertension   6. Hyperlipidemia  7. Anemia of chronic disease  8. Hypothyroidism    History of Present Illness   Gem Jade is a 72 year old female who presented with AMS.  Patient was recently discharged on December 25, 2017 due to sepsis and urinary tract infection at that time.  The patient was on vancomycin and meropenem during that admission initially, and then subsequently consolidated to meropenem during her inpatient stay.  She has neurogenic bladder, requiring a chronic indwelling Rico catheter and a history of Klebsiella, ESBL, VRE, Citrobacter freundii, and candiduria.  Infectious disease has contemplated her clinical scenario and decided against chronic suppressive antibiotic therapy, reportedly, due to the risk of acquisition of Clostridium difficile infection.  Of note, chart review reveals EMS finding of low blood sugar upon initial evaluation of the patient.      Hospital Course   Gem Jade was admitted on 1/11/2018.  The following problems were addressed during her hospitalization:    Urinary tract infection, cathter associated  Patient with hx of indwelling rico. 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  Most recent hospital admission in 12/2017 due to sepsis due to UTI.  Empirically started on Meropenem due to abnormal UA after presenting with AMS pending culture results.  Urine culture grew E. Coli that was pan-sensitive.  Patient was initially placed on Meropenem and this was changed to Ceftriaxone on 1/13 based on culture results.  ID  was consulted and patient was switched to Keflex at discharge.     Acute metabolic encephalopathy, secondary to urinary tract infection,catheter associated and possible hypoglycemia   Presented with AMS. She was noted to have BG of 28 by EMS. In the ED, UA was obtained which was abnormal and rico was exchanged. Work up in the ED, influenza was negative.  A CT of the head was completed showing no acute intracranial hemorrhage, midline shift, or acute infarct, stable-appearing atrophy changes, small fluid at the bilateral maxillary sinuses, along with ethmoid sinus mucosal thickening, per report.  Lactic acid was also normal at 0.7 on admission. Initially there was concern for hypoglycemia as the cause but remained altered despite improved BG and treatment of UTI.  EEG was obtained which was initially concerning for status epileptics. She was transferred to ICU on 1/12 and intubated for airway protection. EEG monitoring per neurology report showed encephalopathy but no seizure activity, thus felt AMS to be due to metabolic rather than seizure. Did not recommend AED. MRI was negative for acute abnormality.  Patient gradually improved with tx of UTI and was successfully extubated on 1/13.  Her mental status has cleared.        Victoriano Arellano, DO    Significant Results and Procedures   See below    Pending Results   These results will be followed up by PCP  Unresulted Labs Ordered in the Past 30 Days of this Admission     Date and Time Order Name Status Description    1/15/2018 0000 Tacrolimus level In process     1/13/2018 1022 Respiratory Virus Panel by PCR In process     1/11/2018 1455 Blood culture Preliminary     1/11/2018 1455 Blood culture Preliminary     12/19/2017 1631 Fungus culture blood Preliminary           Code Status   Full Code       Primary Care Physician   Marivel Barcenas, ALISON    Physical Exam   Temp: 98.5  F (36.9  C) Temp src: Oral BP: 156/70 Pulse: 79 Heart Rate: 73 Resp: 16 SpO2: 97 %  O2 Device: None (Room air)    Vitals:    01/11/18 1436 01/14/18 0600 01/14/18 1834   Weight: 108 kg (238 lb) 108.2 kg (238 lb 8.6 oz) 102.9 kg (226 lb 14.4 oz)     Vital Signs with Ranges  Temp:  [97.7  F (36.5  C)-98.9  F (37.2  C)] 98.5  F (36.9  C)  Pulse:  [77-79] 79  Heart Rate:  [67-75] 73  Resp:  [16] 16  BP: (143-185)/(48-90) 156/70  SpO2:  [95 %-97 %] 97 %  I/O last 3 completed shifts:  In: 1500 [P.O.:500; I.V.:1000]  Out: 1910 [Urine:1910]    Constitutional: Resting comfortably.  NAD  Eyes: EOMI  Respiratory: Clear to auscultation.  No respiratory distress  Cardiovascular: RRR.  No murmurs   GI: Bowel sounds present.  No tenderness   Neuropsychiatric: Alert and oriented x4    Discharge Disposition   Discharged to home  Condition at discharge: Stable    Consultations This Hospital Stay   SWALLOW EVAL SPEECH PATH AT BEDSIDE IP CONSULT  PHYSICAL THERAPY ADULT IP CONSULT  OCCUPATIONAL THERAPY ADULT IP CONSULT  INFECTIOUS DISEASES IP CONSULT  NEPHROLOGY IP CONSULT  NEUROLOGY IP CONSULT  INTENSIVIST IP CONSULT  NEUROLOGY IP CONSULT  PHYSICAL THERAPY ADULT IP CONSULT  OCCUPATIONAL THERAPY ADULT IP CONSULT  WOUND OSTOMY CONTINENCE NURSE  IP CONSULT  SOCIAL WORK IP CONSULT    Time Spent on this Encounter   IVictoriano, personally saw the patient today and spent greater than 30 minutes discharging this patient.    Discharge Orders     Home care nursing referral     Reason for your hospital stay   Urinary tract infection     Follow-up and recommended labs and tests    Follow up with primary care provider, Marivel Barcenas NP, within 7 days for hospital follow- up.  No follow up labs or test are needed.     Activity   Your activity upon discharge: activity as tolerated     Full Code     Diet   Follow this diet upon discharge: Orders Placed This Encounter     Low Consistent CHO Diet       Discharge Medications   Current Discharge Medication List      START taking these medications    Details    cephALEXin (KEFLEX) 500 MG capsule Take 1 capsule (500 mg) by mouth 4 times daily for 7 days  Qty: 28 capsule, Refills: 0    Associated Diagnoses: Urinary tract infection associated with indwelling urethral catheter, initial encounter (H)         CONTINUE these medications which have NOT CHANGED    Details   VITAMIN D, CHOLECALCIFEROL, PO Take 4,000 Units by mouth 2 times daily      tacrolimus (GENERIC EQUIVALENT) 1 MG capsule Take 2 capsules (2 mg) by mouth 2 times daily Total dose 2.5 mg twice daily  Qty: 120 capsule, Refills: 11    Associated Diagnoses: Kidney transplant recipient; Long-term use of immunosuppressant medication      baclofen (LIORESAL) 10 MG tablet Take 1 tablet (10 mg) by mouth 3 times daily as needed for muscle spasms  Qty: 90 tablet, Refills: 4      gabapentin (NEURONTIN) 300 MG capsule Take 3 capsules (900 mg) by mouth 2 times daily For pain  Qty: 360 capsule, Refills: 3    Associated Diagnoses: Diabetic polyneuropathy associated with type 2 diabetes mellitus (H)      lidocaine (XYLOCAINE) 2 % topical gel Apply topically as needed for moderate pain 10 ML every 3 weeks  Qty: 30 mL, Refills: 11    Associated Diagnoses: Johnson catheter in place      guaiFENesin (ROBITUSSIN) 100 MG/5ML SYRP Take 10-20 mLs by mouth every 4 hours as needed for cough  Qty: 560 mL, Refills: 3    Associated Diagnoses: Acute nasopharyngitis      levothyroxine (SYNTHROID/LEVOTHROID) 50 MCG tablet TAKE ONE TABLET BY MOUTH EVERY DAY  Qty: 90 tablet, Refills: 3    Associated Diagnoses: Other specified hypothyroidism      diphenoxylate-atropine (LOMOTIL) 2.5-0.025 MG per tablet Take 1 tablet by mouth 4 times daily as needed for diarrhea  Qty: 30 tablet, Refills: 0    Associated Diagnoses: Diarrhea of presumed infectious origin      meclizine (ANTIVERT) 12.5 MG tablet Take 1 tablet (12.5 mg) by mouth 3 times daily as needed for dizziness  Qty: 30 tablet, Refills: 0    Associated Diagnoses: Disorientation      cloNIDine  (CATAPRES) 0.1 MG tablet TAKE ONE TABLET BY MOUTH TWICE A DAY  Qty: 180 tablet, Refills: 3    Associated Diagnoses: HTN, goal below 140/90      predniSONE (DELTASONE) 5 MG tablet Take 1 tablet (5 mg) by mouth daily  Qty: 90 tablet, Refills: 3    Associated Diagnoses: Kidney replaced by transplant      insulin glargine (LANTUS SOLOSTAR) 100 UNIT/ML injection Inject 24 units under the skin daily.  Qty: 15 mL, Refills: 11    Comments: Hold on file for refills  Associated Diagnoses: Type 2 diabetes mellitus with diabetic polyneuropathy, with long-term current use of insulin (H)      insulin aspart (NOVOLOG FLEXPEN) 100 UNIT/ML injection Inject 10 units under the skin 2 times daily before meals as directed. Add sliding scale as needed. Avg use 25 units per day  Qty: 15 mL, Refills: 11    Associated Diagnoses: Type 2 diabetes mellitus with diabetic polyneuropathy, with long-term current use of insulin (H)      labetalol (NORMODYNE) 300 MG tablet Take 150 mg by mouth 2 times daily Take 1/2 tablet (150mg) =300mg. Hold for systolic BP less than 120.  Qty: 120 tablet, Refills: 3    Associated Diagnoses: Benign essential hypertension; Kidney replaced by transplant      loratadine (CLARITIN) 10 MG tablet Take 1 tablet (10 mg) by mouth daily  Qty: 90 tablet, Refills: 3    Associated Diagnoses: Acute nasopharyngitis      pravastatin (PRAVACHOL) 10 MG tablet TAKE ONE TABLET BY MOUTH EVERY DAY  Qty: 90 tablet, Refills: 3    Associated Diagnoses: Hyperlipidemia LDL goal <100      OXcarbazepine (TRILEPTAL) 300 MG tablet Take 1 tablet (300 mg) by mouth 2 times daily For pain  Qty: 180 tablet, Refills: 3    Associated Diagnoses: Diabetic polyneuropathy associated with diabetes mellitus due to underlying condition (H)      aspirin 81 MG chewable tablet Take 1 tablet (81 mg) by mouth daily Starting 1 week after your kidney biopsy  Qty: 90 tablet, Refills: 3    Associated Diagnoses: Pulmonary embolism and infarction (H); Diabetes mellitus  with background retinopathy (H)      cyanocobalamin (VITAMIN B12) 1000 MCG/ML injection Inject 1 mL (1,000 mcg) into the muscle every 30 days  Qty: 3 mL, Refills: 3    Associated Diagnoses: Iron deficiency anemia, unspecified iron deficiency anemia type      TraMADol HCl 50 MG TBDP Take 50 mg by mouth 3 times daily as needed  Qty: 90 tablet, Refills: 3    Associated Diagnoses: Chronic shoulder pain, unspecified laterality      ondansetron (ZOFRAN ODT) 4 MG disintegrating tablet Take 1-2 tablets (4-8 mg) by mouth every 8 hours as needed for nausea  Qty: 20 tablet, Refills: 5    Associated Diagnoses: Nausea      simethicone (MYLICON) 125 MG CHEW Take 1 tablet (125 mg) by mouth as needed for intestinal gas  Qty: 120 tablet      Lactobacillus Rhamnosus, GG, (CULTURELL) capsule Take 1 capsule by mouth 2 times daily  Qty: 60 capsule, Refills: 11    Associated Diagnoses: Diarrhea      Cranberry 400 MG TABS Take 400 mg by mouth 2 times daily      famotidine (PEPCID) 20 MG tablet Take 1 tablet (20 mg) by mouth 2 times daily  Qty: 60 tablet, Refills: 1      ACETAMINOPHEN PO Take 325-650 mg by mouth every 4 hours as needed.      !! order for DME High back guerrero wheel chair  MICHELLE 99  DX: morbid obesity, generalized weakness, physical deconditioning, chronic vertigo  Qty: 1 Device, Refills: 0    Associated Diagnoses: Morbid obesity, unspecified obesity type (H); Vertigo; Generalized muscle weakness; Physical deconditioning      blood glucose monitoring (ACCU-CHEK COMPACT PLUS) test strip Use to test blood sugar 3 times daily or as directed.  Ok to substitute alternative if insurance prefers.  Qty: 100 strip, Refills: prn      !! order for DME Equipment being ordered: Silver Impregnated catheters  HX of frequent UTI  Qty: 12 each, Refills: 1    Associated Diagnoses: Recurrent UTI      !! order for DME Equipment being ordered: Ahuja FX CPAP interface (nasal pillows), size XS.  Qty: 1 each, Refills: 1    Associated Diagnoses:  Obstructive sleep apnea syndrome      insulin pen needle 30G X 8 MM Use as directed with Lantus  Qty: 100 each, Refills: 3    Associated Diagnoses: Type 2 diabetes mellitus with diabetic nephropathy (H)      !! order for DME Equipment being ordered: Blue Chux  Please send to [facility]  Qty: 100 pad, Refills: 3    Associated Diagnoses: Fecal incontinence      !! ORDER FOR DME Equipment being ordered: Johnson Catheters - 18 Turkish  Qty: 3 catheter, Refills: PRN    Associated Diagnoses: Johnson catheter status       !! - Potential duplicate medications found. Please discuss with provider.        Allergies   Allergies   Allergen Reactions     Bactrim Ds [Sulfamethoxazole W/Trimethoprim]      Creatinine level increased     Atorvastatin Calcium      myalgias, muscle aches     Codeine Nausea and Vomiting     Darvocet [Propoxyphene N-Apap] Nausea and Vomiting     Hydromorphone      Levofloxacin Other (See Comments) and Diarrhea     hallucinations     Morphine      Nausea and vomiting     Niaspan [Niacin] GI Disturbance     Flushing even at low dose, GI upset     Percocet [Oxycodone-Acetaminophen]      Vomiting after taking med couple of times     Vicodin [Hydrocodone-Acetaminophen] Nausea and Vomiting     Data   Most Recent 3 CBC's:  Recent Labs   Lab Test  01/15/18   0755  01/14/18   0353  01/13/18   0535   WBC  5.3  5.7  5.4   HGB  10.8*  9.9*  11.6*   MCV  94  92  91   PLT  134*  122*  119*      Most Recent 3 BMP's:  Recent Labs   Lab Test  01/15/18   0755  01/14/18   0353  01/13/18   0535   NA  136  139  136   POTASSIUM  3.5  3.6  3.9   CHLORIDE  104  107  104   CO2  21  23  24   BUN  12  12  16   CR  0.82  0.88  0.95   ANIONGAP  11  9  8   ROGER  8.4*  7.6*  8.0*   GLC  96  63*  75     Most Recent 2 LFT's:  Recent Labs   Lab Test  01/14/18   0353  01/11/18   1502   AST  14  24   ALT  11  16   ALKPHOS  67  86   BILITOTAL  0.4  0.3     Most Recent INR's and Anticoagulation Dosing History:  Anticoagulation Dose History      Recent Dosing and Labs Latest Ref Rng & Units 8/31/2016 9/1/2016 9/2/2016 3/8/2017 4/26/2017 4/26/2017 11/6/2017    INR 0.86 - 1.14 0.98 1.13 1.09 1.01 1.06 1.03 1.09        Most Recent 3 Troponin's:  Recent Labs   Lab Test  11/13/17   1613  04/19/17   0005  01/10/16   1408   TROPI  <0.015  <0.015  The 99th percentile for upper reference range is 0.045 ug/L.  Troponin values in   the range of 0.045 - 0.120 ug/L may be associated with risks of adverse   clinical events.    <0.015  The 99th percentile for upper reference range is 0.045 ug/L.  Troponin values in   the range of 0.045 - 0.120 ug/L may be associated with risks of adverse   clinical events.       Most Recent Cholesterol Panel:  Recent Labs   Lab Test  12/05/16   1210   CHOL  164   LDL  64   HDL  46*   TRIG  272*     Most Recent 6 Bacteria Isolates From Any Culture (See EPIC Reports for Culture Details):  Recent Labs   Lab Test  01/11/18   1614  01/11/18   1530  12/19/17   2233  12/19/17   1900  12/19/17   1620  12/19/17   1619   CULT  No growth after 4 days  >100,000 colonies/mL  Escherichia coli  *  No growth after 4 days  No MRSA isolated  No growth after 27 days  No growth  No growth     Most Recent TSH, T4 and A1c Labs:  Recent Labs   Lab Test  01/12/18   0648  12/19/17   1602   03/09/17   1310   TSH   --   1.34   < >  1.20   T4   --    --    --   0.72*   A1C  6.3*  6.7*   < >   --     < > = values in this interval not displayed.     Results for orders placed or performed during the hospital encounter of 01/11/18   XR Chest 2 Views    Narrative    CHEST TWO VIEWS 1/11/2018 4:07 PM     HISTORY: Cough and altered mental status    COMPARISON: 12/19/2017     FINDINGS: There are no acute infiltrates. The cardiac silhouette is  not enlarged. Pulmonary vasculature is unremarkable.      Impression    IMPRESSION: No acute disease.    HERB REDDY MD   Head CT w/o contrast    Narrative    CT SCAN OF THE HEAD WITHOUT CONTRAST   1/11/2018 4:42 PM     HISTORY:  Altered mental status.    TECHNIQUE: 5 mm thick axial images of the head without IV contrast  material. Radiation dose for this scan was reduced using automated  exposure control, adjustment of the mA and/or kV according to patient  size, or iterative reconstruction technique.    COMPARISON: 12/19/2017.    FINDINGS: There is no evidence of intracranial hemorrhage, midline  shift, or acute infarct. Bilateral atrophy changes noted of the brain  parenchyma. Small fluid at the bilateral maxillary sinuses and some  ethmoid sinus mucosal thickening. Mastoids appear clear. There is no  evidence for fracture.      Impression    IMPRESSION: No acute intracranial hemorrhage, midline shift or acute  infarct. Stable-appearing atrophy changes. Small fluid at the  bilateral maxillary sinuses, along with ethmoid sinus mucosal  thickening.    GISSEL HARDIN MD   MR Brain w/o & w Contrast    Narrative    MRI BRAIN WITHOUT AND WITH CONTRAST January 13, 2018 11:50 AM    HISTORY: Altered mental status.    TECHNIQUE: Multiplanar, multisequence MRI of the brain without and  with 11mL Gadavist.    COMPARISON: Head CT 1/11/2018. Brain MR 9/2/2016.    FINDINGS: There is no evidence of hemorrhage, mass, acute infarct, or  anomaly. There is generalized atrophy of the brain. White matter T2  hyperintensities are seen in the cerebral hemispheres consistent with  sequelae of small vessel ischemic disease. There are no gadolinium  enhancing lesions.    Mucosal thickening throughout the paranasal sinuses. There are  air-fluid layers in the maxillary and sphenoid sinuses which are not  unexpected in the setting of intubation. Bilateral mastoid effusions  left greater than right. The arteries at the base of the brain and the  dural venous sinuses appear patent.       Impression    IMPRESSION:    1. No evidence of acute infarct, mass, hemorrhage, or herniation.  2. There is generalized atrophy of the brain. White matter changes are  present in the  cerebral hemispheres that are consistent with small  vessel ischemic disease in this age patient.        ANABELA GONGORA MD   XR Chest Port 1 View    Narrative    XR CHEST PORT 1 VW  1/12/2018 5:30 PM     HISTORY:  intubation;     COMPARISON: Film dated 1/11/2018    FINDINGS:  ET tube is in place. The tip is about 1 cm above the  abena. It could be pulled back. The lungs remain clear. Aortic  calcification.      Impression    IMPRESSION: ET tube tip is just above the abena comment could be  pulled back.    NAYANA GONSALES MD   XR Chest Port 1 View    Narrative    XR CHEST PORT 1 VW  1/12/2018 5:35 PM     HISTORY:  Endotracheal tube positioning;     COMPARISON: None.    FINDINGS:  The ET tube has been repositioned. The tip is now about 2.7  cm above the level of the abena. The lungs remain clear.    NAYANA GONSALES MD     *Note: Due to a large number of results and/or encounters for the requested time period, some results have not been displayed. A complete set of results can be found in Results Review.

## 2018-01-15 NOTE — TELEPHONE ENCOUNTER
Incoming call from pt's spouse stating that pt was discharged from the hospital today. VICENTE Molina

## 2018-01-15 NOTE — PROGRESS NOTES
SW:  D: Pt has discharge orders to return home.  Pt is open to home care with [facility].  Pt is cared for by  at home and she requires lift for repositioning and is bed bound; she requires stretcher transportation to get home. Pt has 2-3 stairs to enter the home.  I: [facility] ordered stretcher transportation for discharge at 6:00 via . PCS form completed, faxed and placed on chart.  RN, CC,HUC,BB all updated with discharge plan. [facility] updated to resume care.  P: Pt discharging home via HE stretcher under the care to  and [facility]    RAMILA Prasad  [facility]  Phone: [phone]

## 2018-01-15 NOTE — PROVIDER NOTIFICATION
MD Notification    Notified Person:  MD    Notified Persons Name: Dr. Arellano    Notification Date/Time: 1/15/18 1148    Notification Interaction:  paged Physician    Purpose of Notification: elevated Bp 182/82    Orders Received: recheck BP- 156/70, no new orders

## 2018-01-15 NOTE — PLAN OF CARE
Problem: Patient Care Overview  Goal: Plan of Care/Patient Progress Review  Outcome: Improving  Pt is A&O X4. Neuros intact and checks have been discontinued. Pt tele is NSR and Tele has been removed per dr order. Pt has chronic rico in place with good urine output and brandie-cares are complete. Skin in skin folds reddened blanchable and cleaned. Pt remains on ISO contact and droplet precautions. Pt assist with repositioning.Pt has wound on coccyx with CDI dressing. Lower extremity hemiparesis. L side upper hemiparesis  Continue to Monitor.

## 2018-01-15 NOTE — PLAN OF CARE
Problem: Patient Care Overview  Goal: Plan of Care/Patient Progress Review  Outcome: Adequate for Discharge Date Met: 01/15/18  VSS except BP elevated, now resolved. A&Ox4. Neuros intact, +2 BLE edema. Back pain managed with repositioning and baclofen. Repo with lift every 2 hrs, mepilex over coccyx, woc saw pt. Chronic rico patent. BG checks 92 and 107. Tolerating diet. Plan for d/c home later today.     No further changes, . D/c home with , stretcher ride at 1800.

## 2018-01-15 NOTE — PROGRESS NOTES
[facility]  Infectious Disease Progress Note          Assessment and Plan:    ANTONINO Jade is a 72 year old female who was admitted on 1/11/2018.      Impression:  1.  A 72-year-old female well known to us from prior admissions, admitted with mental status changes, UTI being suspected but patient afebrile, normal wbc.   2.  History of Recurrent urinary tract infections, chronic Johnson catheter in place, has had vancomycin-resistant enterococcus and extended spectrum beta lactamase in the past.   3.  Diabetes mellitus. Her blood glucose level per EMS was 28.   4.  Prior renal transplant, on chronic immunosuppressant therapy without major immunosuppressing infections, renal function slightly abnormal, but stable.   5.  Sulfa allergy and listed quinolone allergy, quinolones were challenged in past successfully.  6.  History of C diff.           RECOMMENDATIONS:   1. Procalcitonin  negative. No fever, no leucocytosis.   2. Multiple antibiotics for possible UTI` s in past might have been a cause of MDR and C diff so trying to minimize  3. On Ceftriaxone , covers present day organism, can be switched keflex ant any point for total of 10 days course.     Signing off please call with ques          Interval History:   no fever UC pansens e coli              Medications:       OXcarbazepine  300 mg Oral BID     insulin aspart  1-7 Units Subcutaneous TID AC     insulin aspart  1-5 Units Subcutaneous At Bedtime     cefTRIAXone  1 g Intravenous Q24H     aspirin  81 mg Oral Daily     cloNIDine  0.1 mg Oral BID     labetalol  150 mg Oral BID     levothyroxine  50 mcg Oral Daily     pravastatin  10 mg Oral Daily     predniSONE  5 mg Oral Daily     tacrolimus  2 mg Oral BID     cholecalciferol  4,000 Units Oral BID     sodium chloride (PF)  3 mL Intracatheter Q8H     enoxaparin  40 mg Subcutaneous Q24H                  Physical Exam:   Blood pressure 143/81, pulse 79, temperature 98.3  F (36.8  C), temperature  "source Oral, resp. rate 16, height 1.676 m (5' 6\"), weight 102.9 kg (226 lb 14.4 oz), SpO2 96 %, not currently breastfeeding.  Wt Readings from Last 2 Encounters:   01/14/18 102.9 kg (226 lb 14.4 oz)   12/23/17 108 kg (238 lb)     Vital Signs with Ranges  Temp:  [97.7  F (36.5  C)-98.9  F (37.2  C)] 98.3  F (36.8  C)  Pulse:  [77-79] 79  Heart Rate:  [65-75] 75  Resp:  [16-18] 16  BP: ()/(35-90) 143/81  SpO2:  [94 %-99 %] 96 %    Constitutional: Intubated, ICU   Lungs: Clear to auscultation bilaterally, no crackles or wheezing   Cardiovascular: Regular rate and rhythm, normal S1 and S2, and no murmur noted   Abdomen: Normal bowel sounds, soft, non-distended, non-tender   Skin: No rashes, no cyanosis, no edema   Other:           Data:   All microbiology laboratory data reviewed.  Recent Labs   Lab Test  01/15/18   0755  01/14/18   0353  01/13/18   0535   WBC  5.3  5.7  5.4   HGB  10.8*  9.9*  11.6*   HCT  33.9*  30.7*  35.0   MCV  94  92  91   PLT  134*  122*  119*     Recent Labs   Lab Test  01/15/18   0755  01/14/18   0353  01/13/18   0535   CR  0.82  0.88  0.95     Recent Labs   Lab Test  05/25/16   2116   SED  53*     Recent Labs   Lab Test  01/11/18   1614  01/11/18   1530  12/19/17   2233  12/19/17   1900  12/19/17   1620  12/19/17   1619  12/19/17   1602  11/13/17   1714  11/13/17   1646   CULT  No growth after 4 days  >100,000 colonies/mL  Escherichia coli  *  No growth after 4 days  No MRSA isolated  No growth after 24 days  No growth  No growth  No growth  No growth  >100,000 colonies/mL  Candida albicans / dubliniensis  Candida albicans and Candida dubliniensis are not routinely speciated  Susceptibility testing not routinely done  *       "

## 2018-01-15 NOTE — PROVIDER NOTIFICATION
Prescriber Notification Note    The pharmacist has communicated with this patient's provider regarding a concern or therapy recommendation.    Notified Person: ANA Arellano  Date/Time of Notification: 1/15/18 6456  Interaction: text page  Concern/Recommendation:ultram has <1% incidence alone, of causing seizures. Since patient has a seizure history, requested clarification of if tramadol should be continued.      Comments/Additional Details:no response. Verified the tramadol due to a very low incidence, and left a note.

## 2018-01-16 LAB
BACTERIA SPEC CULT: NORMAL
Lab: NORMAL
SPECIMEN SOURCE: NORMAL

## 2018-01-16 NOTE — PROCEDURES
INPATIENT PORTABLE ONE HOUR VIDEO ELECTROENCEPHALOGRAM      ORDERING PHYSICIAN:  ANTHONY Durand CNP       EEG #       DATE OF STUDY:  2018       HISTORY:  Maryjane Shaver is a 72-year-old who presents with significant alteration of mental status.  There are multiple medical metabolic problems.  Cerebral imaging is reportedly normal.  She is being evaluated for epilepsy.  She is not currently being treated with antiseizure medications.      FINDINGS:  Diffuse moderate amplitude 3-4 Hz activity predominates.  There is no posterior dominant rhythm.  When patient is lying quietly, occasional runs of generalized sharp waves are seen.  The sharp waves have a triphasic morphology.  They recur at the rate of 1.1 to 1.2 Hz.  The runs of sharp waves do not exceed 3-4 seconds and the sharp waves are typically irregular and sometimes difficult to discern from the background.  They are predominant frontally.  With stimulation, triphasic waves, if present, seem to bartolo.  They occupy about 20% of the record.  They do not evolve in manner consistent with electrographic seizure.      OTHER INTERICTAL ABNORMALITIES:  No focal epileptiform discharges and no periodic lateralizing discharges.      ICTAL:  No electrographic seizures.      IMPRESSION:  Abnormal, consistent with moderate to severe diffuse encephalopathy.  Generalized triphasic sharp waves waxed and waned and appeared to respond to stimulation.  These findings are likely part of the patient's generalized encephalopathy and do not represent a seizure state.  Electrographic seizures were not seen in this study.         FRANKY ACOSTA MD             D: 2018 17:45   T: 2018 08:01   MT: ARTUR      Name:     MARYJANE SHAVER   MRN:      [id number]        Account:        [id number]   :      1945           Procedure Date: 2018      Document: [id number]       cc: Pam CRUZ CNP

## 2018-01-16 NOTE — PROCEDURES
LONG TERM ELECTROENCEPHALOGRAM WITH VIDEO      ORDERING PHYSICIAN:  Dr. Boyd      [facility] EEG #  day one    DATE AND DURATION: Study started on 01/12/2018 at 15:32:26 and ended on 01/13/2018 at 12:52:24 PM.      HISTORY:  Gem Jade is a 72-year-old with obtundation in ICU.  There is concern that her mental status changes are related to subclinical seizures.  The patient has multiple medical problems.  She is not currently being treated with anti-seizure medications.  She was treated with propofol and fentanyl drip at the beginning of the study, but these were tapered and discontinued towards the end.      TECHNICAL SUMMARY:  EEG was recorded from scalp electrodes applied according to the 10-20 international system.  Additional electrodes were utilized referencing, artifact detection, and recording from all other cerebral regions.  Video was continuously recorded.  Video was reviewed for clinical correlation and to assist with EEG interpretation.  At study onset, irregular delta and theta were noted with no normal posterior dominant rhythm.  Generalized periodic sharp waves were seen on occasion and would sometimes occur in brief runs at the rate of 1.0 to 1.2 Hz.  Amplitude shifted from side-to-side, but sharp waves are synchronous and symmetrical.      The patient was intubated at around 1630.  There was significant attenuation of background.  Presumably related to application of anesthetic doses associated with intubation.  Propofol was subsequently administered.  EEG showed some discontinuities and starting at around 0640 was more attenuated than previously.  By 0900, diffuse irregular theta and delta were seen but the generalized periodic sharp waves noted previously were sparse.      Electrographic seizures were not seen.      IMPRESSION:  Abnormal.  There are varying degrees of a diffuse encephalopathy, ranging from mild to moderate to at times moderate to severe.  These were likely in part related to  anesthetic drips that were utilized throughout.  Generalized periodic sharp waves were seen at the beginning of the study but were infrequent towards the study's end.  Overall, these were felt to be related to the patient's encephalopathy rather than an indication of ongoing seizure tendency.  Electrographic seizures were not seen at any point.         FRANKY ACOSTA MD             D: 2018 20:05   T: 2018 17:39   MT: DAVID      Name:     MARYJANE SHAVER   MRN:      [id number]        Account:        [id number]   :      1945           Procedure Date: 2018      Document: [id number]       cc: Yenifer Boyd MD

## 2018-01-17 LAB
BACTERIA SPEC CULT: NO GROWTH
BACTERIA SPEC CULT: NO GROWTH
Lab: NORMAL
Lab: NORMAL
SPECIMEN SOURCE: NORMAL
SPECIMEN SOURCE: NORMAL

## 2018-01-18 ENCOUNTER — APPOINTMENT (OUTPATIENT)
Dept: CT IMAGING | Facility: CLINIC | Age: 73
DRG: 100 | End: 2018-01-18
Attending: EMERGENCY MEDICINE
Payer: MEDICARE

## 2018-01-18 ENCOUNTER — APPOINTMENT (OUTPATIENT)
Dept: CT IMAGING | Facility: CLINIC | Age: 73
DRG: 100 | End: 2018-01-18
Attending: INTERNAL MEDICINE
Payer: MEDICARE

## 2018-01-18 ENCOUNTER — APPOINTMENT (OUTPATIENT)
Dept: GENERAL RADIOLOGY | Facility: CLINIC | Age: 73
DRG: 100 | End: 2018-01-18
Attending: EMERGENCY MEDICINE
Payer: MEDICARE

## 2018-01-18 ENCOUNTER — HOSPITAL ENCOUNTER (INPATIENT)
Facility: CLINIC | Age: 73
LOS: 7 days | Discharge: HOME HEALTH | DRG: 100 | End: 2018-01-25
Attending: EMERGENCY MEDICINE | Admitting: INTERNAL MEDICINE
Payer: MEDICARE

## 2018-01-18 ENCOUNTER — TELEPHONE (OUTPATIENT)
Dept: FAMILY MEDICINE | Facility: CLINIC | Age: 73
End: 2018-01-18

## 2018-01-18 DIAGNOSIS — R41.89 UNRESPONSIVE: ICD-10-CM

## 2018-01-18 DIAGNOSIS — G93.40 ENCEPHALOPATHY ACUTE: Primary | ICD-10-CM

## 2018-01-18 DIAGNOSIS — E11.42 DIABETIC POLYNEUROPATHY ASSOCIATED WITH TYPE 2 DIABETES MELLITUS (H): ICD-10-CM

## 2018-01-18 DIAGNOSIS — R13.10 DYSPHAGIA, UNSPECIFIED TYPE: ICD-10-CM

## 2018-01-18 DIAGNOSIS — G40.901 STATUS EPILEPTICUS (H): ICD-10-CM

## 2018-01-18 PROBLEM — N39.0 UTI (URINARY TRACT INFECTION): Status: ACTIVE | Noted: 2017-04-19

## 2018-01-18 PROBLEM — A41.9 SEPSIS (H): Status: ACTIVE | Noted: 2017-11-13

## 2018-01-18 LAB
ALBUMIN SERPL-MCNC: 2.8 G/DL (ref 3.4–5)
ALBUMIN SERPL-MCNC: NORMAL G/DL (ref 3.4–5)
ALBUMIN UR-MCNC: 100 MG/DL
ALP SERPL-CCNC: 86 U/L (ref 40–150)
ALP SERPL-CCNC: NORMAL U/L (ref 40–150)
ALT SERPL W P-5'-P-CCNC: 13 U/L (ref 0–50)
ALT SERPL W P-5'-P-CCNC: NORMAL U/L (ref 0–50)
AMMONIA PLAS-SCNC: 13 UMOL/L (ref 10–50)
ANION GAP SERPL CALCULATED.3IONS-SCNC: 9 MMOL/L (ref 3–14)
ANION GAP SERPL CALCULATED.3IONS-SCNC: NORMAL MMOL/L (ref 6–17)
APPEARANCE UR: ABNORMAL
AST SERPL W P-5'-P-CCNC: 13 U/L (ref 0–45)
AST SERPL W P-5'-P-CCNC: NORMAL U/L (ref 0–45)
BASE EXCESS BLDA CALC-SCNC: 1.9 MMOL/L
BASOPHILS # BLD AUTO: 0 10E9/L (ref 0–0.2)
BASOPHILS NFR BLD AUTO: 0.2 %
BILIRUB SERPL-MCNC: 0.7 MG/DL (ref 0.2–1.3)
BILIRUB SERPL-MCNC: NORMAL MG/DL (ref 0.2–1.3)
BILIRUB UR QL STRIP: NEGATIVE
BUN SERPL-MCNC: 13 MG/DL (ref 7–30)
BUN SERPL-MCNC: NORMAL MG/DL (ref 7–30)
CALCIUM SERPL-MCNC: 8.7 MG/DL (ref 8.5–10.1)
CALCIUM SERPL-MCNC: NORMAL MG/DL (ref 8.5–10.1)
CHLORIDE SERPL-SCNC: 103 MMOL/L (ref 94–109)
CHLORIDE SERPL-SCNC: NORMAL MMOL/L (ref 94–109)
CO2 SERPL-SCNC: 26 MMOL/L (ref 20–32)
CO2 SERPL-SCNC: NORMAL MMOL/L (ref 20–32)
COLOR UR AUTO: YELLOW
CREAT SERPL-MCNC: 0.99 MG/DL (ref 0.52–1.04)
CREAT SERPL-MCNC: NORMAL MG/DL (ref 0.52–1.04)
DIFFERENTIAL METHOD BLD: ABNORMAL
DIFFERENTIAL METHOD BLD: NORMAL
EOSINOPHIL # BLD AUTO: 0.1 10E9/L (ref 0–0.7)
EOSINOPHIL NFR BLD AUTO: 2.5 %
ERYTHROCYTE [DISTWIDTH] IN BLOOD BY AUTOMATED COUNT: 14.1 % (ref 10–15)
ERYTHROCYTE [DISTWIDTH] IN BLOOD BY AUTOMATED COUNT: NORMAL % (ref 10–15)
GFR SERPL CREATININE-BSD FRML MDRD: 55 ML/MIN/1.7M2
GFR SERPL CREATININE-BSD FRML MDRD: NORMAL ML/MIN/1.7M2
GLUCOSE BLDC GLUCOMTR-MCNC: 183 MG/DL (ref 70–99)
GLUCOSE BLDC GLUCOMTR-MCNC: 239 MG/DL (ref 70–99)
GLUCOSE BLDC GLUCOMTR-MCNC: 273 MG/DL (ref 70–99)
GLUCOSE SERPL-MCNC: 264 MG/DL (ref 70–99)
GLUCOSE SERPL-MCNC: NORMAL MG/DL (ref 70–99)
GLUCOSE UR STRIP-MCNC: 150 MG/DL
HCO3 BLD-SCNC: 26 MMOL/L (ref 21–28)
HCT VFR BLD AUTO: 33.1 % (ref 35–47)
HCT VFR BLD AUTO: NORMAL % (ref 35–47)
HGB BLD-MCNC: 10.9 G/DL (ref 11.7–15.7)
HGB BLD-MCNC: NORMAL G/DL (ref 11.7–15.7)
HGB UR QL STRIP: ABNORMAL
IMM GRANULOCYTES # BLD: 0.1 10E9/L (ref 0–0.4)
IMM GRANULOCYTES NFR BLD: 1.1 %
KETONES UR STRIP-MCNC: NEGATIVE MG/DL
LACTATE BLD-SCNC: 0.9 MMOL/L (ref 0.7–2)
LEUKOCYTE ESTERASE UR QL STRIP: ABNORMAL
LYMPHOCYTES # BLD AUTO: 1 10E9/L (ref 0.8–5.3)
LYMPHOCYTES NFR BLD AUTO: 20.6 %
MCH RBC QN AUTO: 30.8 PG (ref 26.5–33)
MCH RBC QN AUTO: NORMAL PG (ref 26.5–33)
MCHC RBC AUTO-ENTMCNC: 32.9 G/DL (ref 31.5–36.5)
MCHC RBC AUTO-ENTMCNC: NORMAL G/DL (ref 31.5–36.5)
MCV RBC AUTO: 94 FL (ref 78–100)
MCV RBC AUTO: NORMAL FL (ref 78–100)
MONOCYTES # BLD AUTO: 0.2 10E9/L (ref 0–1.3)
MONOCYTES NFR BLD AUTO: 5.1 %
MRSA DNA SPEC QL NAA+PROBE: NEGATIVE
NEUTROPHILS # BLD AUTO: 3.3 10E9/L (ref 1.6–8.3)
NEUTROPHILS NFR BLD AUTO: 70.5 %
NITRATE UR QL: NEGATIVE
NRBC # BLD AUTO: 0 10*3/UL
NRBC BLD AUTO-RTO: 0 /100
O2/TOTAL GAS SETTING VFR VENT: ABNORMAL %
OXYHGB MFR BLD: 99 % (ref 92–100)
PCO2 BLD: 39 MM HG (ref 35–45)
PH BLD: 7.43 PH (ref 7.35–7.45)
PH UR STRIP: 7 PH (ref 5–7)
PLATELET # BLD AUTO: 134 10E9/L (ref 150–450)
PLATELET # BLD AUTO: NORMAL 10E9/L (ref 150–450)
PO2 BLD: 169 MM HG (ref 80–105)
POTASSIUM SERPL-SCNC: 3.5 MMOL/L (ref 3.4–5.3)
POTASSIUM SERPL-SCNC: NORMAL MMOL/L (ref 3.4–5.3)
PROCALCITONIN SERPL-MCNC: 0.12 NG/ML
PROT SERPL-MCNC: 7.2 G/DL (ref 6.8–8.8)
PROT SERPL-MCNC: NORMAL G/DL (ref 6.8–8.8)
RBC # BLD AUTO: 3.54 10E12/L (ref 3.8–5.2)
RBC # BLD AUTO: NORMAL 10E12/L (ref 3.8–5.2)
RBC #/AREA URNS AUTO: 115 /HPF (ref 0–2)
SODIUM SERPL-SCNC: 138 MMOL/L (ref 133–144)
SODIUM SERPL-SCNC: NORMAL MMOL/L (ref 133–144)
SOURCE: ABNORMAL
SP GR UR STRIP: 1.01 (ref 1–1.03)
SPECIMEN SOURCE: NORMAL
UROBILINOGEN UR STRIP-MCNC: NORMAL MG/DL (ref 0–2)
WBC # BLD AUTO: 4.7 10E9/L (ref 4–11)
WBC # BLD AUTO: NORMAL 10E9/L (ref 4–11)
WBC #/AREA URNS AUTO: >182 /HPF (ref 0–2)
WBC CLUMPS #/AREA URNS HPF: PRESENT /HPF
YEAST #/AREA URNS HPF: ABNORMAL /HPF

## 2018-01-18 PROCEDURE — 25000128 H RX IP 250 OP 636: Performed by: EMERGENCY MEDICINE

## 2018-01-18 PROCEDURE — 25000128 H RX IP 250 OP 636

## 2018-01-18 PROCEDURE — 25000125 ZZHC RX 250: Performed by: INTERNAL MEDICINE

## 2018-01-18 PROCEDURE — 87640 STAPH A DNA AMP PROBE: CPT | Performed by: INTERNAL MEDICINE

## 2018-01-18 PROCEDURE — 25000132 ZZH RX MED GY IP 250 OP 250 PS 637: Mod: GY | Performed by: INTERNAL MEDICINE

## 2018-01-18 PROCEDURE — 99291 CRITICAL CARE FIRST HOUR: CPT | Mod: 25

## 2018-01-18 PROCEDURE — 96365 THER/PROPH/DIAG IV INF INIT: CPT

## 2018-01-18 PROCEDURE — 40000275 ZZH STATISTIC RCP TIME EA 10 MIN

## 2018-01-18 PROCEDURE — 99291 CRITICAL CARE FIRST HOUR: CPT | Mod: 25 | Performed by: INTERNAL MEDICINE

## 2018-01-18 PROCEDURE — 80197 ASSAY OF TACROLIMUS: CPT | Performed by: INTERNAL MEDICINE

## 2018-01-18 PROCEDURE — 76705 ECHO EXAM OF ABDOMEN: CPT

## 2018-01-18 PROCEDURE — 74178 CT ABD&PLV WO CNTR FLWD CNTR: CPT

## 2018-01-18 PROCEDURE — 25000125 ZZHC RX 250: Performed by: EMERGENCY MEDICINE

## 2018-01-18 PROCEDURE — 40000358 ZZHCL STATISTIC DRUG SCREEN MULTIPLE (METRO): Performed by: INTERNAL MEDICINE

## 2018-01-18 PROCEDURE — 70450 CT HEAD/BRAIN W/O DYE: CPT

## 2018-01-18 PROCEDURE — 31500 INSERT EMERGENCY AIRWAY: CPT

## 2018-01-18 PROCEDURE — 25000131 ZZH RX MED GY IP 250 OP 636 PS 637: Mod: GY | Performed by: INTERNAL MEDICINE

## 2018-01-18 PROCEDURE — 85025 COMPLETE CBC W/AUTO DIFF WBC: CPT | Performed by: EMERGENCY MEDICINE

## 2018-01-18 PROCEDURE — 20000003 ZZH R&B ICU

## 2018-01-18 PROCEDURE — 83605 ASSAY OF LACTIC ACID: CPT | Performed by: EMERGENCY MEDICINE

## 2018-01-18 PROCEDURE — 84425 ASSAY OF VITAMIN B-1: CPT | Performed by: EMERGENCY MEDICINE

## 2018-01-18 PROCEDURE — 99292 CRITICAL CARE ADDL 30 MIN: CPT

## 2018-01-18 PROCEDURE — 40000986 XR CHEST PORT 1 VW

## 2018-01-18 PROCEDURE — 25000128 H RX IP 250 OP 636: Performed by: INTERNAL MEDICINE

## 2018-01-18 PROCEDURE — 87106 FUNGI IDENTIFICATION YEAST: CPT | Performed by: EMERGENCY MEDICINE

## 2018-01-18 PROCEDURE — 36415 COLL VENOUS BLD VENIPUNCTURE: CPT | Performed by: INTERNAL MEDICINE

## 2018-01-18 PROCEDURE — 00000146 ZZHCL STATISTIC GLUCOSE BY METER IP

## 2018-01-18 PROCEDURE — 93308 TTE F-UP OR LMTD: CPT

## 2018-01-18 PROCEDURE — 80307 DRUG TEST PRSMV CHEM ANLYZR: CPT | Performed by: INTERNAL MEDICINE

## 2018-01-18 PROCEDURE — 80053 COMPREHEN METABOLIC PANEL: CPT | Performed by: EMERGENCY MEDICINE

## 2018-01-18 PROCEDURE — 36600 WITHDRAWAL OF ARTERIAL BLOOD: CPT

## 2018-01-18 PROCEDURE — 87086 URINE CULTURE/COLONY COUNT: CPT | Performed by: EMERGENCY MEDICINE

## 2018-01-18 PROCEDURE — 5A1945Z RESPIRATORY VENTILATION, 24-96 CONSECUTIVE HOURS: ICD-10-PCS | Performed by: EMERGENCY MEDICINE

## 2018-01-18 PROCEDURE — 82140 ASSAY OF AMMONIA: CPT | Performed by: INTERNAL MEDICINE

## 2018-01-18 PROCEDURE — A9270 NON-COVERED ITEM OR SERVICE: HCPCS | Mod: GY | Performed by: INTERNAL MEDICINE

## 2018-01-18 PROCEDURE — 84145 PROCALCITONIN (PCT): CPT | Performed by: EMERGENCY MEDICINE

## 2018-01-18 PROCEDURE — 81001 URINALYSIS AUTO W/SCOPE: CPT | Performed by: EMERGENCY MEDICINE

## 2018-01-18 PROCEDURE — 96366 THER/PROPH/DIAG IV INF ADDON: CPT

## 2018-01-18 PROCEDURE — 87040 BLOOD CULTURE FOR BACTERIA: CPT | Performed by: EMERGENCY MEDICINE

## 2018-01-18 PROCEDURE — 96375 TX/PRO/DX INJ NEW DRUG ADDON: CPT

## 2018-01-18 PROCEDURE — 82805 BLOOD GASES W/O2 SATURATION: CPT | Performed by: EMERGENCY MEDICINE

## 2018-01-18 PROCEDURE — 87641 MR-STAPH DNA AMP PROBE: CPT | Performed by: INTERNAL MEDICINE

## 2018-01-18 RX ORDER — AMOXICILLIN 250 MG
2 CAPSULE ORAL 2 TIMES DAILY PRN
Status: DISCONTINUED | OUTPATIENT
Start: 2018-01-18 | End: 2018-01-25 | Stop reason: HOSPADM

## 2018-01-18 RX ORDER — LABETALOL HYDROCHLORIDE 5 MG/ML
10 INJECTION, SOLUTION INTRAVENOUS
Status: DISCONTINUED | OUTPATIENT
Start: 2018-01-18 | End: 2018-01-25 | Stop reason: HOSPADM

## 2018-01-18 RX ORDER — POTASSIUM CL/LIDO/0.9 % NACL 10MEQ/0.1L
10 INTRAVENOUS SOLUTION, PIGGYBACK (ML) INTRAVENOUS
Status: DISCONTINUED | OUTPATIENT
Start: 2018-01-18 | End: 2018-01-25 | Stop reason: HOSPADM

## 2018-01-18 RX ORDER — ETOMIDATE 2 MG/ML
20 INJECTION INTRAVENOUS ONCE
Status: COMPLETED | OUTPATIENT
Start: 2018-01-18 | End: 2018-01-18

## 2018-01-18 RX ORDER — VECURONIUM BROMIDE 1 MG/ML
10 INJECTION, POWDER, LYOPHILIZED, FOR SOLUTION INTRAVENOUS ONCE
Status: COMPLETED | OUTPATIENT
Start: 2018-01-18 | End: 2018-01-18

## 2018-01-18 RX ORDER — HYDRALAZINE HYDROCHLORIDE 20 MG/ML
10 INJECTION INTRAMUSCULAR; INTRAVENOUS EVERY 4 HOURS PRN
Status: DISCONTINUED | OUTPATIENT
Start: 2018-01-18 | End: 2018-01-25 | Stop reason: HOSPADM

## 2018-01-18 RX ORDER — PROPOFOL 10 MG/ML
INJECTION, EMULSION INTRAVENOUS
Status: COMPLETED
Start: 2018-01-18 | End: 2018-01-18

## 2018-01-18 RX ORDER — NALOXONE HYDROCHLORIDE 1 MG/ML
1 INJECTION INTRAMUSCULAR; INTRAVENOUS; SUBCUTANEOUS ONCE
Status: COMPLETED | OUTPATIENT
Start: 2018-01-18 | End: 2018-01-18

## 2018-01-18 RX ORDER — PROPOFOL 10 MG/ML
100 INJECTION, EMULSION INTRAVENOUS ONCE
Status: COMPLETED | OUTPATIENT
Start: 2018-01-18 | End: 2018-01-18

## 2018-01-18 RX ORDER — POTASSIUM CHLORIDE 29.8 MG/ML
20 INJECTION INTRAVENOUS
Status: DISCONTINUED | OUTPATIENT
Start: 2018-01-18 | End: 2018-01-25 | Stop reason: HOSPADM

## 2018-01-18 RX ORDER — IOPAMIDOL 755 MG/ML
112 INJECTION, SOLUTION INTRAVASCULAR ONCE
Status: COMPLETED | OUTPATIENT
Start: 2018-01-18 | End: 2018-01-18

## 2018-01-18 RX ORDER — ASPIRIN 81 MG/1
81 TABLET, CHEWABLE ORAL DAILY
Status: DISCONTINUED | OUTPATIENT
Start: 2018-01-19 | End: 2018-01-25 | Stop reason: HOSPADM

## 2018-01-18 RX ORDER — CLONIDINE HYDROCHLORIDE 0.1 MG/1
0.1 TABLET ORAL 2 TIMES DAILY
Status: DISCONTINUED | OUTPATIENT
Start: 2018-01-18 | End: 2018-01-25 | Stop reason: HOSPADM

## 2018-01-18 RX ORDER — FENTANYL CITRATE 50 UG/ML
25-50 INJECTION, SOLUTION INTRAMUSCULAR; INTRAVENOUS
Status: DISCONTINUED | OUTPATIENT
Start: 2018-01-18 | End: 2018-01-22

## 2018-01-18 RX ORDER — PROPOFOL 10 MG/ML
10-20 INJECTION, EMULSION INTRAVENOUS EVERY 30 MIN PRN
Status: DISCONTINUED | OUTPATIENT
Start: 2018-01-18 | End: 2018-01-20

## 2018-01-18 RX ORDER — MAGNESIUM SULFATE HEPTAHYDRATE 40 MG/ML
4 INJECTION, SOLUTION INTRAVENOUS EVERY 4 HOURS PRN
Status: DISCONTINUED | OUTPATIENT
Start: 2018-01-18 | End: 2018-01-25 | Stop reason: HOSPADM

## 2018-01-18 RX ORDER — NALOXONE HYDROCHLORIDE 0.4 MG/ML
.1-.4 INJECTION, SOLUTION INTRAMUSCULAR; INTRAVENOUS; SUBCUTANEOUS
Status: DISCONTINUED | OUTPATIENT
Start: 2018-01-18 | End: 2018-01-18

## 2018-01-18 RX ORDER — PREDNISONE 5 MG/1
5 TABLET ORAL DAILY
Status: DISCONTINUED | OUTPATIENT
Start: 2018-01-19 | End: 2018-01-25 | Stop reason: HOSPADM

## 2018-01-18 RX ORDER — ONDANSETRON 4 MG/1
4 TABLET, ORALLY DISINTEGRATING ORAL EVERY 6 HOURS PRN
Status: DISCONTINUED | OUTPATIENT
Start: 2018-01-18 | End: 2018-01-25 | Stop reason: HOSPADM

## 2018-01-18 RX ORDER — POTASSIUM CHLORIDE 7.45 MG/ML
10 INJECTION INTRAVENOUS
Status: DISCONTINUED | OUTPATIENT
Start: 2018-01-18 | End: 2018-01-25 | Stop reason: HOSPADM

## 2018-01-18 RX ORDER — ONDANSETRON 2 MG/ML
4 INJECTION INTRAMUSCULAR; INTRAVENOUS EVERY 6 HOURS PRN
Status: DISCONTINUED | OUTPATIENT
Start: 2018-01-18 | End: 2018-01-25 | Stop reason: HOSPADM

## 2018-01-18 RX ORDER — CHLORHEXIDINE GLUCONATE ORAL RINSE 1.2 MG/ML
15 SOLUTION DENTAL EVERY 12 HOURS
Status: DISCONTINUED | OUTPATIENT
Start: 2018-01-18 | End: 2018-01-22

## 2018-01-18 RX ORDER — VECURONIUM BROMIDE 1 MG/ML
INJECTION, POWDER, LYOPHILIZED, FOR SOLUTION INTRAVENOUS
Status: COMPLETED
Start: 2018-01-18 | End: 2018-01-18

## 2018-01-18 RX ORDER — HEPARIN SODIUM 5000 [USP'U]/.5ML
5000 INJECTION, SOLUTION INTRAVENOUS; SUBCUTANEOUS EVERY 8 HOURS
Status: DISCONTINUED | OUTPATIENT
Start: 2018-01-18 | End: 2018-01-25 | Stop reason: HOSPADM

## 2018-01-18 RX ORDER — LEVOTHYROXINE SODIUM 50 UG/1
50 TABLET ORAL
Status: DISCONTINUED | OUTPATIENT
Start: 2018-01-19 | End: 2018-01-25 | Stop reason: HOSPADM

## 2018-01-18 RX ORDER — DEXTROSE MONOHYDRATE 25 G/50ML
25-50 INJECTION, SOLUTION INTRAVENOUS
Status: DISCONTINUED | OUTPATIENT
Start: 2018-01-18 | End: 2018-01-25 | Stop reason: HOSPADM

## 2018-01-18 RX ORDER — NICOTINE POLACRILEX 4 MG
15-30 LOZENGE BUCCAL
Status: DISCONTINUED | OUTPATIENT
Start: 2018-01-18 | End: 2018-01-25 | Stop reason: HOSPADM

## 2018-01-18 RX ORDER — MEROPENEM AND SODIUM CHLORIDE 1 G/50ML
1 INJECTION, SOLUTION INTRAVENOUS EVERY 8 HOURS
Status: DISCONTINUED | OUTPATIENT
Start: 2018-01-19 | End: 2018-01-22

## 2018-01-18 RX ORDER — NALOXONE HYDROCHLORIDE 0.4 MG/ML
.1-.4 INJECTION, SOLUTION INTRAMUSCULAR; INTRAVENOUS; SUBCUTANEOUS
Status: DISCONTINUED | OUTPATIENT
Start: 2018-01-18 | End: 2018-01-25 | Stop reason: HOSPADM

## 2018-01-18 RX ORDER — POTASSIUM CHLORIDE 1.5 G/1.58G
20-40 POWDER, FOR SOLUTION ORAL
Status: DISCONTINUED | OUTPATIENT
Start: 2018-01-18 | End: 2018-01-25 | Stop reason: HOSPADM

## 2018-01-18 RX ORDER — LABETALOL HYDROCHLORIDE 5 MG/ML
INJECTION, SOLUTION INTRAVENOUS
Status: DISCONTINUED
Start: 2018-01-18 | End: 2018-01-18 | Stop reason: WASHOUT

## 2018-01-18 RX ORDER — MEROPENEM AND SODIUM CHLORIDE 1 G/50ML
1 INJECTION, SOLUTION INTRAVENOUS ONCE
Status: COMPLETED | OUTPATIENT
Start: 2018-01-18 | End: 2018-01-18

## 2018-01-18 RX ORDER — AMOXICILLIN 250 MG
1 CAPSULE ORAL 2 TIMES DAILY PRN
Status: DISCONTINUED | OUTPATIENT
Start: 2018-01-18 | End: 2018-01-25 | Stop reason: HOSPADM

## 2018-01-18 RX ORDER — POTASSIUM CHLORIDE 1500 MG/1
20-40 TABLET, EXTENDED RELEASE ORAL
Status: DISCONTINUED | OUTPATIENT
Start: 2018-01-18 | End: 2018-01-25 | Stop reason: HOSPADM

## 2018-01-18 RX ORDER — SODIUM CHLORIDE 9 MG/ML
INJECTION, SOLUTION INTRAVENOUS CONTINUOUS
Status: DISCONTINUED | OUTPATIENT
Start: 2018-01-18 | End: 2018-01-20

## 2018-01-18 RX ORDER — WATER 10 ML/10ML
INJECTION INTRAMUSCULAR; INTRAVENOUS; SUBCUTANEOUS
Status: DISCONTINUED
Start: 2018-01-18 | End: 2018-01-18 | Stop reason: HOSPADM

## 2018-01-18 RX ORDER — CYANOCOBALAMIN 1000 UG/ML
1000 INJECTION, SOLUTION INTRAMUSCULAR; SUBCUTANEOUS
Status: DISCONTINUED | OUTPATIENT
Start: 2018-02-08 | End: 2018-01-25 | Stop reason: HOSPADM

## 2018-01-18 RX ORDER — HYDRALAZINE HYDROCHLORIDE 20 MG/ML
INJECTION INTRAMUSCULAR; INTRAVENOUS
Status: DISCONTINUED
Start: 2018-01-18 | End: 2018-01-22 | Stop reason: HOSPADM

## 2018-01-18 RX ORDER — PROPOFOL 10 MG/ML
5-75 INJECTION, EMULSION INTRAVENOUS CONTINUOUS
Status: DISCONTINUED | OUTPATIENT
Start: 2018-01-18 | End: 2018-01-18

## 2018-01-18 RX ORDER — PROPOFOL 10 MG/ML
5-75 INJECTION, EMULSION INTRAVENOUS CONTINUOUS
Status: DISCONTINUED | OUTPATIENT
Start: 2018-01-18 | End: 2018-01-20

## 2018-01-18 RX ORDER — NALOXONE HYDROCHLORIDE 1 MG/ML
INJECTION INTRAMUSCULAR; INTRAVENOUS; SUBCUTANEOUS
Status: COMPLETED
Start: 2018-01-18 | End: 2018-01-18

## 2018-01-18 RX ADMIN — NALOXONE HYDROCHLORIDE 1 MG: 1 INJECTION INTRAMUSCULAR; INTRAVENOUS; SUBCUTANEOUS at 14:35

## 2018-01-18 RX ADMIN — HEPARIN SODIUM 5000 UNITS: 5000 INJECTION, SOLUTION INTRAVENOUS; SUBCUTANEOUS at 22:40

## 2018-01-18 RX ADMIN — PROPOFOL 50 MCG/KG/MIN: 10 INJECTION, EMULSION INTRAVENOUS at 16:57

## 2018-01-18 RX ADMIN — MEROPENEM AND SODIUM CHLORIDE 1 G: 1 INJECTION, SOLUTION INTRAVENOUS at 15:38

## 2018-01-18 RX ADMIN — FENTANYL CITRATE 50 MCG: 50 INJECTION, SOLUTION INTRAMUSCULAR; INTRAVENOUS at 17:33

## 2018-01-18 RX ADMIN — ETOMIDATE 20 MG: 20 INJECTION, SOLUTION INTRAVENOUS at 14:25

## 2018-01-18 RX ADMIN — IOPAMIDOL 112 ML: 755 INJECTION, SOLUTION INTRAVENOUS at 22:07

## 2018-01-18 RX ADMIN — SODIUM CHLORIDE 74 ML: 9 INJECTION, SOLUTION INTRAVENOUS at 22:08

## 2018-01-18 RX ADMIN — SUCCINYLCHOLINE CHLORIDE 200 MG: 20 INJECTION, SOLUTION INTRAMUSCULAR; INTRAVENOUS at 14:25

## 2018-01-18 RX ADMIN — VECURONIUM BROMIDE 10 MG: 1 INJECTION, POWDER, LYOPHILIZED, FOR SOLUTION INTRAVENOUS at 14:42

## 2018-01-18 RX ADMIN — NALOXONE HYDROCHLORIDE 1 MG: 1 INJECTION PARENTERAL at 14:35

## 2018-01-18 RX ADMIN — INSULIN ASPART 1 UNITS: 100 INJECTION, SOLUTION INTRAVENOUS; SUBCUTANEOUS at 22:40

## 2018-01-18 RX ADMIN — TACROLIMUS 2.5 MG: 0.5 CAPSULE ORAL at 22:39

## 2018-01-18 RX ADMIN — PROPOFOL 50 MCG/KG/MIN: 10 INJECTION, EMULSION INTRAVENOUS at 14:42

## 2018-01-18 RX ADMIN — PANTOPRAZOLE SODIUM 40 MG: 40 INJECTION, POWDER, FOR SOLUTION INTRAVENOUS at 17:33

## 2018-01-18 RX ADMIN — SODIUM CHLORIDE 1000 ML: 9 INJECTION, SOLUTION INTRAVENOUS at 14:32

## 2018-01-18 RX ADMIN — INSULIN ASPART 2 UNITS: 100 INJECTION, SOLUTION INTRAVENOUS; SUBCUTANEOUS at 18:57

## 2018-01-18 RX ADMIN — PROPOFOL 100 MG: 10 INJECTION, EMULSION INTRAVENOUS at 14:40

## 2018-01-18 RX ADMIN — SODIUM CHLORIDE 1000 ML: 9 INJECTION, SOLUTION INTRAVENOUS at 16:23

## 2018-01-18 RX ADMIN — CHLORHEXIDINE GLUCONATE 15 ML: 1.2 RINSE ORAL at 20:29

## 2018-01-18 NOTE — IP AVS SNAPSHOT
MRN:[id number]                      After Visit Summary   1/18/2018    Gem Jade    MRN: [id number]           Thank you!     Thank you for choosing [facility] for your care. Our goal is always to provide you with excellent care. Hearing back from our patients is one way we can continue to improve our services. Please take a few minutes to complete the written survey that you may receive in the mail after you visit with us. Thank you!        Patient Information     Date Of Birth          1945        Designated Caregiver       Most Recent Value    Caregiver    Will someone help with your care after discharge? yes    Name of designated caregiver jamie    Phone number of caregiver see contact info    Caregiver address see contact info      About your hospital stay     You were admitted on:  January 18, 2018 You last received care in the:  [facility] [address] [facility]    You were discharged on:  January 25, 2018        Reason for your hospital stay       You were admitted to the hospital secondary to confusion and there was concern for possible seizure during your workup.                  Who to Call     For medical emergencies, please call 911.  For non-urgent questions about your medical care, please call your primary care provider or clinic, [phone]          Attending Provider     Provider Specialty    Armen Sauceda MD Emergency Medicine    Dunnegan, Marivel Springer MD Internal Medicine    Shante, Santiago Lemus MD Critical Care Medicine    Sammi Lai MD Internal Medicine       Primary Care Provider Office Phone # Fax #    ANTHONY [facility] [phone]      After Care Instructions     Activity       Your activity upon discharge: activity as tolerated            Diet       Follow this diet upon discharge: Orders Placed This Encounter      Room Service      Snacks/Supplements Adult: Other - Please comment; Magic Shakes; Between Meals       Combination Diet Dysphagia Diet Level 3: Advanced; Thin Liquids (water, ice chips, juice, milk gelatin, ice cream, etc)            Discharge Instructions       You are started on new medicine called Keppra 1000 mg , take 2 times a day to prevent seizure , you will also be getting MRI of the brain in few weeks and then will see neurologist for follow up and review the results of your MRI.  Some of your medicines which can cause confusion and seizures are stopped :  Please do not take tramadol ( ultram ) and trileptal any more .  Only take Baclofen on the day of urinary catheter insertion to help the spasm but avoid taking it every day .  Also your Gabapentin dose is decreased to 600 mg two times a day from 900 mg two times a day.  For the next week, only take Novolog coverage as per sliding scale but do not take any Lantus as you did not require any Lantus during the hospitalization , once your blood sugars as high as 180 and you are requiring novolog with each meal, probably you can be started back on Lantus slowly like 5 units every night and then go up on it slowly as per your family doctor recommendations.  You are also given the letter to bring with you to the Er if needed regarding rico catheter replacement.                  Follow-up Appointments     Follow-up and recommended labs and tests        Follow up with primary care provider, Marivel Barcenas NP, within 7 days to evaluate medication change and for hospital follow- up.  No follow up labs or test are needed.  Please also keep appointment with neurology in 4 weeks and you will also need repeated MRI of the brain before the appointment.                  Your next 10 appointments already scheduled     Jan 26, 2018  5:30 AM CST   Inpatient Meal Follow Up Therapy with Nikia Iglesias, MIRIAM   [facility] ([facility])    [address], [address]  [facility] [address]   [phone]            Feb 05, 2018   3:30 PM CST   Telephone Visit with ANTHONY Antonio CNP   [facility] ([facility])    [address] [place] [address]  [address] [place] [id number] [phone]           Note: this is not an onsite visit; there is no need to come to the facility.            Feb 06, 2018  3:30 PM CST   TELEMEDICINE with Conchita Toledo SONA   [facility] ([facility])    [address] [place] [address]  [address] [place] [address] [phone]           Note: this is not an onsite visit; there is no need to come to the facility.              Additional Services     Home Care SLP Referral for Hospital Discharge       SLP to eval and treat    Your provider has ordered home care - speech therapy. If you have not been contacted within 2 days of your discharge please call the department phone number listed on the top of this document.            Home care nursing referral       RN extended hours visit. RN to assess vital signs and weight, respiratory and cardiac status and patients ability to take and record daily blood pressure, temp and weight.  RN to provide education regarding medication intake and changes made during this hospitalization, also assess for home safety  Home health aide to help with patient care     Your provider has ordered home care nursing services. If you have not been contacted within 2 days of your discharge please call the inpatient department phone number at [phone] .                  Future tests that were ordered for you     MRI Brain w & w/o contrast                 Further instructions from your care team       You are started on new medicine called Keppra 1000 mg , take 2 times a day to prevent seizure , you will also be getting MRI of the brain in few weeks and then will see neurologist for follow up and review the results of your MRI.  Some of your medicines which  can cause confusion and seizures are stopped :  Please do not take tramadol ( ultram ) and trileptal any more .  Only take Baclofen on the day of urinary catheter insertion to help the spasm but avoid taking it every day .  Also your Gabapentin dose is decreased to 600 mg two times a day from 900 mg two times a day.  For the next week, only take Novolog coverage as per sliding scale but do not take any Lantus as you did not require any Lantus during the hospitalization , once your blood sugars as high as 180 and you are requiring novolog with each meal, probably you can be started back on Lantus slowly like 5 units every night and then go up on it slowly as per your family doctor recommendations.    Correction Scale - LOW INSULIN RESISTANCE DOSING     Do Not give Correction Insulin if BG less than 140.  For  - 239 give 1 unit.  For  - 339 give 2 units.  For BG  340 - 439  give 3 units  For BG greater than or equal to 440 give 4 units    You are also given the letter to bring with you to the Er if needed regarding rico catheter replacement.    Understanding Seizures    What is a seizure?    A sudden is a sudden, brief change in electrical activity in your brain. Seizures can be caused by injury to the brain, scar formation, tumors, stroke or infection. Often the cause is not known. With treatment, most people lead normal lives.      What is the treatment for seizures?    The main treatment for seizures are anti-epileptic drugs (AEDs). These drugs greatly reduce or prevent seizures in most people. Tips for taking your medicines:    Never stop taking your seizure medicine without talking to your doctor. Do not stop your drugs because seizures have stopped.    Do not skip a dose. This can change how well the drug works.    If you miss a dose, take it as soon as you remember. If it is within a few hours of your next dose, skip the dose you forgot, and take your next dose. After that, go back to the normal  schedule.    If you miss more than one dose, talk to your doctor or nurse.    NEVER take double doses.    Get refills ahead of time before you run out.    Store your medicines in a dry place. Keep medicines in the bottle that they came in. Throw away all used bottles.    Keep a list of the medicines you take.    Don't take herbal supplements or antacids without talking with your doctor first. And ask your pharmacist about taking drugstore medicines.    If you need help remembering to take your medicines, use a pillbox.    Ask family, friends or coworkers to remind you when it is time to take a dose.    Your blood levels will need to be tested to be sure you are getting the right dose. Your doctor will tell you when you should be tested.    If you have any side effects or another seizure, your doctor may need to adjust the dose or change the medicines.      What are the side effects of AED medicines?    You may have: dizziness, trouble thinking, tiredness, stomach upset, weight gain or loss, depression or allergic reaction (such as rash or fever).    If this is a new drug or your dose has changed, the side effects should go away. Always talk to your doctor about any side effects. Your doctor can suggest ways to manage them.      What can I do to take care of myself?    Your lifestyle  Keep to a schedule as much as you can. Get plenty of sleep. Try to manage your stress.  Most people with seizures can lead an active life. Plan ahead for activities that carry some risk. You should:    Always swim with a  or brandon present.    Wear a helmet for biking, skiing and similar sports.    Ask your doctor about contact sports.    Avoid activities where having a seizure would put you or someone else in danger.    Safe activities include jogging, aerobics, cross-country skiing, dancing, hiking, bowling and tennis.    It is a good idea to wear a medical alert bracelet. Tell family members, friends and co-workers about your  seizure disorder.    Your home  Be sure your home is set up to be safe.    Keep your bathroom and bedroom doors unlocked. Do not block these doors.    Take showers only. In a bath, you risk drowning during a seizure.    Replace all glass doors with safety glass or plastic.    Fill your plate or bowl near the stove instead of carrying all the food to the table.    When cooking, turn the pot and pan handles towards the back of the stove.    Driving your car is generally safe and legal once seizures are under control. [place] state law says that a person must not drive for 3 months if a seizure is resulted in loss of consciousness.    Brain alerting chemicals  Many chemicals can cause seizures. Even small amounts of alcohol can trigger seizures. Illegal drugs can also cause severe seizures, even in people who do not normally have them.      How should family and friends respond to a seizure?    There is no way to stop a seizure. Family and caregivers can try to make sure you are safe.    When a seizure starts, family or caregivers should:    Keep you from falling.    Loosen tight clothing, especially around the neck.    Cushion the head.    Clear the area of furniture and sharp objects.    Turn you on your side. If you vomit, this helps prevent inhaling vomit.    Stay with you until you recover or medical help arrives.    Take your pulse and watch your rate of breathing, if possible.    Seizures typically last less than 3 minutes. Afterward, the person may be tired, confused and achy. HE or she may need to sleep for several hours to recover.    Call 911 if:    A seizure lasts more than 5 minutes.    This is the first seizure ever.    Another seizure starts before awareness returns after a prior seizure.    The person had a seizure in water.    The person is pregnant, injured or has diabetes.    The person does not have a medical ID bracelet instructing what to do.    The person does not wake up or behave  normally.    The seizure is different than the usual seizure.      When someone has a seizure DO NOT    Place anything between the person's teeth (inculding your fingers).    Try to hold the person down    Give the person something to eat or drink until he or she is fully awake and alert.    Move the person unless they are in danger or near a hazard.    Start CPR unless the seizure has stopped and the person is not breathing or has no pulse.    Try to stop the person from convulsing. They have no control over the seizure.      When should I call my doctor?  Call if you have:    Seizures more often. Especially if they have been under control for a long time.    Weakness, problems with seeing or new problems with balance.    Unusual behavior.    Side effects from medicines that are not going away or are getting worse.      Coping at Home    Seizures affect those around you, too. Talk with your loved ones and learn their concerns.    People with seizures can hold many kinds of jobs. But here are some issues to keep in mind.    Your work needs to be safe. How well controlled are your seizures? Does the job involve tasks that may not be safe for you, such as driving or using heavy machinery?    You may want to tell your boss or co-workers about your seizures. This is your choice. But it may be safer if people at your workplace are prepared to respond to a seizure. If you are concerned about losing your job, know your rights. The Americans with Disabilities Act provides work related protections for people with epilepsy.    Know the signs of depression. Depression can affect your thoughts and feelings. It can be caused by trouble coping with seizures. Call your doctor if you are having problems with this.    Resources    Epilepsy foundation: www.epilepsyfoundationmn.org  Epilepsy.com: www.epilepsy.com  National [place] of Neurological Disorders and Stroke: www.ninds.nih.gov  KidsHealth.org: www.kidshealth.org  [place]  "Equal Opportunity Commission: www.eeoc.giv/facts/epilepsy.html   Phone: 774.957.6339        Pending Results     Date and Time Order Name Status Description    1/24/2018 2300 Tacrolimus level In process     1/19/2018 0015 Baclofen level In process     1/19/2018 0015 Gabapentin level In process     1/19/2018 0015 Oxcarbazepine level In process             Statement of Approval     Ordered          01/25/18 7152  I have reviewed and agree with all the recommendations and orders detailed in this document.  EFFECTIVE NOW     Approved and electronically signed by:  Sammi Lai MD             Admission Information     Date & Time Provider Department Dept. Phone    1/18/2018 Sammi Lai MD [address] [facility] [place] [phone]      Your Vitals Were     Blood Pressure Pulse Temperature Respirations Height Weight    137/63 (BP Location: Left arm) 80 97.9  F (36.6  C) (Oral) 16 1.676 m (5' 6\") 105.9 kg (233 lb 7.5 oz)    Pulse Oximetry BMI (Body Mass Index)                95% 37.68 kg/m2          [employer] Information     [employer] gives you secure access to your electronic health record. If you see a primary care provider, you can also send messages to your care team and make appointments. If you have questions, please call your primary care clinic.  If you do not have a primary care provider, please call [phone] and they will assist you.        Care EveryWhere ID     This is your Care EveryWhere ID. This could be used by other organizations to access your [facility] medical records  [id number]        Equal Access to Services     [facility] PEETR : Hadii jairo hurdo Sokaleigh, waaxda luqadaha, qaybta kaalmada adeegyada, jessica roman. So [facility] [phone].    ATENCIÓN: Si habla español, tiene a peguero disposición servicios gratuitos de asistencia lingüística. Llame al [phone].    We comply with applicable federal civil rights laws and [place] laws. We do not discriminate on " the basis of race, color, national origin, age, disability, sex, sexual orientation, or gender identity.               Review of your medicines      START taking        Dose / Directions    levETIRAcetam 1000 MG Tabs   Used for:  Status epilepticus (H)   Notes to Patient:  This is the only new medication this hospitalization. This medication is used to help prevent seizures. It is important to take this medication as prescribed & on time. If doses are missed, it increases your chances of having a seizure--please see  Instructions included with the mecication if dose was missed.         Dose:  1000 mg   Take 1,000 mg by mouth 2 times daily   Quantity:  60 tablet   Refills:  1         CONTINUE these medicines which may have CHANGED, or have new prescriptions. If we are uncertain of the size of tablets/capsules you have at home, strength may be listed as something that might have changed.        Dose / Directions    gabapentin 300 MG capsule   Commonly known as:  NEURONTIN   This may have changed:  how much to take   Used for:  Diabetic polyneuropathy associated with type 2 diabetes mellitus (H)   Notes to Patient:   Gabapentin dose is decreased to 600 mg two times a day from 900 mg two times a day.          Dose:  600 mg   Take 2 capsules (600 mg) by mouth 2 times daily For pain   Quantity:  360 capsule   Refills:  3       insulin aspart 100 UNIT/ML injection   Commonly known as:  NovoLOG FLEXPEN   This may have changed:  additional instructions   Used for:  Type 2 diabetes mellitus with diabetic polyneuropathy, with long-term current use of insulin (H)   Notes to Patient:  For the next week, only take Novolog coverage as per sliding scale  Correction Scale - LOW INSULIN RESISTANCE DOSING     Do Not give Correction Insulin if BG less than 140.  For  - 239 give 1 unit.  For  - 339 give 2 units.  For BG  340 - 439  give 3 units  For BG greater than or equal to 440 give 4 units        Inject 10 units under  the skin 2 times daily before meals as directed. Add sliding scale as needed. Avg use 25 units per day   Quantity:  15 mL   Refills:  11       insulin glargine 100 UNIT/ML injection   Commonly known as:  LANTUS SOLOSTAR   This may have changed:    - how much to take  - how to take this  - when to take this  - additional instructions   Used for:  Type 2 diabetes mellitus with diabetic polyneuropathy, with long-term current use of insulin (H)   Notes to Patient:  Do not take any Lantus as you did not require any Lantus during the hospitalization , once your blood sugars as high as 180 and you are requiring novolog with each meal, probably you can be started back on Lantus slowly like 5 units every night and then go up on it slowly as per your family doctor recommendations        Inject 24 units under the skin daily.   Quantity:  15 mL   Refills:  11         CONTINUE these medicines which have NOT CHANGED        Dose / Directions    ACCU-CHEK COMPACT PLUS test strip   Generic drug:  blood glucose monitoring   Notes to Patient:  Be sure to track blood sugar readings on a piece of paper to bring into your primary care provider for the follow up visit so they can determine if they need to adjust any of your insulin orders.        Use to test blood sugar 3 times daily or as directed.  Ok to substitute alternative if insurance prefers.   Quantity:  100 strip   Refills:  prn       ACETAMINOPHEN PO        Dose:  325-650 mg   Take 325-650 mg by mouth every 4 hours as needed.   Refills:  0       aspirin 81 MG chewable tablet   Used for:  Pulmonary embolism and infarction (H), Diabetes mellitus with background retinopathy (H)        Dose:  81 mg   Take 1 tablet (81 mg) by mouth daily Starting 1 week after your kidney biopsy   Quantity:  90 tablet   Refills:  3       baclofen 10 MG tablet   Commonly known as:  LIORESAL   Notes to Patient:  Only take Baclofen on the day of urinary catheter insertion to help the spasm but avoid  taking it every day.             Dose:  10 mg   Take 1 tablet (10 mg) by mouth 3 times daily as needed for muscle spasms   Quantity:  90 tablet   Refills:  4       cloNIDine 0.1 MG tablet   Commonly known as:  CATAPRES   Used for:  HTN, goal below 140/90        TAKE ONE TABLET BY MOUTH TWICE A DAY   Quantity:  180 tablet   Refills:  3       Cranberry 400 MG Tabs        Dose:  400 mg   Take 400 mg by mouth 2 times daily   Refills:  0       cyanocobalamin 1000 MCG/ML injection   Commonly known as:  VITAMIN B12   Used for:  Iron deficiency anemia, unspecified iron deficiency anemia type        Dose:  1000 mcg   Inject 1 mL (1,000 mcg) into the muscle every 30 days   Quantity:  3 mL   Refills:  3       diphenoxylate-atropine 2.5-0.025 MG per tablet   Commonly known as:  LOMOTIL   Used for:  Diarrhea of presumed infectious origin        Dose:  1 tablet   Take 1 tablet by mouth 4 times daily as needed for diarrhea   Quantity:  30 tablet   Refills:  0       famotidine 20 MG tablet   Commonly known as:  PEPCID        Dose:  20 mg   Take 1 tablet (20 mg) by mouth 2 times daily   Quantity:  60 tablet   Refills:  1       insulin pen needle 30G X 8 MM   Used for:  Type 2 diabetes mellitus with diabetic nephropathy (H)   Notes to Patient:  Please see Dr. Cano notes regarding this medication in the discharge paperwork        Use as directed with Lantus   Quantity:  100 each   Refills:  3       labetalol 300 MG tablet   Commonly known as:  NORMODYNE   Used for:  Benign essential hypertension, Kidney replaced by transplant        Dose:  150 mg   Take 150 mg by mouth 2 times daily Take 1/2 tablet (150mg) =300mg. Hold for systolic BP less than 120.   Quantity:  120 tablet   Refills:  3       lactobacillus rhamnosus (GG) capsule   Used for:  Diarrhea        Dose:  1 capsule   Take 1 capsule by mouth 2 times daily   Quantity:  60 capsule   Refills:  11       levothyroxine 50 MCG tablet   Commonly known as:  SYNTHROID/LEVOTHROID    Used for:  Other specified hypothyroidism        TAKE ONE TABLET BY MOUTH EVERY DAY   Quantity:  90 tablet   Refills:  3       lidocaine 2 % topical gel   Commonly known as:  XYLOCAINE   Used for:  Johnson catheter in place        Apply topically as needed for moderate pain 10 ML every 3 weeks   Quantity:  30 mL   Refills:  11       loratadine 10 MG tablet   Commonly known as:  CLARITIN   Used for:  Acute nasopharyngitis        Dose:  10 mg   Take 1 tablet (10 mg) by mouth daily   Quantity:  90 tablet   Refills:  3       order for DME   Used for:  Johnson catheter status        Equipment being ordered: Johnson Catheters - 18 french   Quantity:  3 catheter   Refills:  PRN       * order for DME   Used for:  Fecal incontinence        Equipment being ordered: Blue Chux Please send to [facility]   Quantity:  100 pad   Refills:  3       * order for DME   Used for:  Obstructive sleep apnea syndrome        Equipment being ordered: Kidamom FX CPAP interface (nasal pillows), size XS.   Quantity:  1 each   Refills:  1       * order for DME   Used for:  Recurrent UTI        Equipment being ordered: Silver Impregnated catheters HX of frequent UTI   Quantity:  12 each   Refills:  1       * order for DME   Used for:  Morbid obesity, unspecified obesity type (H), Vertigo, Generalized muscle weakness, Physical deconditioning        High back guerrero wheel chair MICHELLE 99 DX: morbid obesity, generalized weakness, physical deconditioning, chronic vertigo   Quantity:  1 Device   Refills:  0       pravastatin 10 MG tablet   Commonly known as:  PRAVACHOL   Used for:  Hyperlipidemia LDL goal <100        TAKE ONE TABLET BY MOUTH EVERY DAY   Quantity:  90 tablet   Refills:  3       predniSONE 5 MG tablet   Commonly known as:  DELTASONE   Used for:  Kidney replaced by transplant        Dose:  5 mg   Take 1 tablet (5 mg) by mouth daily   Quantity:  90 tablet   Refills:  3       simethicone 125 MG Chew chewable tablet   Commonly known as:  MYLICON         Dose:  125 mg   Take 1 tablet (125 mg) by mouth as needed for intestinal gas   Quantity:  120 tablet   Refills:  0       TACROLIMUS PO        Dose:  2.5 mg   Take 2.5 mg by mouth 2 times daily   Refills:  0       VITAMIN D (CHOLECALCIFEROL) PO        Dose:  4000 Units   Take 4,000 Units by mouth 2 times daily   Refills:  0       * Notice:  This list has 4 medication(s) that are the same as other medications prescribed for you. Read the directions carefully, and ask your doctor or other care provider to review them with you.      STOP taking     cephALEXin 500 MG capsule   Commonly known as:  KEFLEX           guaiFENesin 100 MG/5ML Syrp   Commonly known as:  ROBITUSSIN           meclizine 12.5 MG tablet   Commonly known as:  ANTIVERT           ondansetron 4 MG ODT tab   Commonly known as:  ZOFRAN ODT           TraMADol HCl 50 MG Tbdp           TRILEPTAL 300 MG tablet   Generic drug:  OXcarbazepine                Where to get your medicines      These medications were sent to [facility] - [address]  [address] [place] [id number]    Hours:  Mon-Fri 8:30am-5:00pm Toll Free [phone] Phone:  [phone]     levETIRAcetam 1000 MG Tabs                Protect others around you: Learn how to safely use, store and throw away your medicines at www.disposemymeds.org.             Medication List: This is a list of all your medications and when to take them. Check marks below indicate your daily home schedule. Keep this list as a reference.      Medications           Morning Afternoon Evening Bedtime As Needed    ACCU-CHEK COMPACT PLUS test strip   Use to test blood sugar 3 times daily or as directed.  Ok to substitute alternative if insurance prefers.   Generic drug:  blood glucose monitoring   Notes to Patient:  Be sure to track blood sugar readings on a piece of paper to bring into your primary care provider for the follow up visit so they can  determine if they need to adjust any of your insulin orders.                                ACETAMINOPHEN PO   Take 325-650 mg by mouth every 4 hours as needed.   Last time this was given:  Not given during this hospitalization.   Next Dose Due:  Any time                                   aspirin 81 MG chewable tablet   Take 1 tablet (81 mg) by mouth daily Starting 1 week after your kidney biopsy   Last time this was given:  81 mg on 1/25/2018  9:49 AM   Next Dose Due:  Tomorrow morning 1/26/18                                   baclofen 10 MG tablet   Commonly known as:  LIORESAL   Take 1 tablet (10 mg) by mouth 3 times daily as needed for muscle spasms   Last time this was given:  Not given during this hospitalization.   Notes to Patient:  Only take Baclofen on the day of urinary catheter insertion to help the spasm but avoid taking it every day.                                        cloNIDine 0.1 MG tablet   Commonly known as:  CATAPRES   TAKE ONE TABLET BY MOUTH TWICE A DAY   Last time this was given:  0.1 mg on 1/25/2018  9:50 AM   Next Dose Due:  Tonight at bedtime 8PM                                      Cranberry 400 MG Tabs   Take 400 mg by mouth 2 times daily   Last time this was given:  Not given during this hospitalization.   Next Dose Due:  Resume home regimen                                      cyanocobalamin 1000 MCG/ML injection   Commonly known as:  VITAMIN B12   Inject 1 mL (1,000 mcg) into the muscle every 30 days   Next Dose Due:  Resume home regimen                                diphenoxylate-atropine 2.5-0.025 MG per tablet   Commonly known as:  LOMOTIL   Take 1 tablet by mouth 4 times daily as needed for diarrhea   Last time this was given:  Not given during this hospitalization. You did receive Imodium for loose stools.                                    famotidine 20 MG tablet   Commonly known as:  PEPCID   Take 1 tablet (20 mg) by mouth 2 times daily   Last time this was given:  You were  given Protonix which is a substitute of this medication while you were here. That was last given at 950AM today 1/25/18.   Next Dose Due:  Tonight at bedtime 1/25/18 9PM                                  gabapentin 300 MG capsule   Commonly known as:  NEURONTIN   Take 2 capsules (600 mg) by mouth 2 times daily For pain   Last time this was given:  600 mg on 1/25/2018  9:50 AM   Next Dose Due:  Tonight at bedtime 1/25/18   Notes to Patient:   Gabapentin dose is decreased to 600 mg two times a day from 900 mg two times a day.                                        insulin aspart 100 UNIT/ML injection   Commonly known as:  NovoLOG FLEXPEN   Inject 10 units under the skin 2 times daily before meals as directed. Add sliding scale as needed. Avg use 25 units per day   Last time this was given:  2 Units on 1/25/2018  1:54 PM   Notes to Patient:  For the next week, only take Novolog coverage as per sliding scale  Correction Scale - LOW INSULIN RESISTANCE DOSING     Do Not give Correction Insulin if BG less than 140.  For  - 239 give 1 unit.  For  - 339 give 2 units.  For BG  340 - 439  give 3 units  For BG greater than or equal to 440 give 4 units                                insulin glargine 100 UNIT/ML injection   Commonly known as:  LANTUS SOLOSTAR   Inject 24 units under the skin daily.   Notes to Patient:  Do not take any Lantus as you did not require any Lantus during the hospitalization , once your blood sugars as high as 180 and you are requiring novolog with each meal, probably you can be started back on Lantus slowly like 5 units every night and then go up on it slowly as per your family doctor recommendations                                insulin pen needle 30G X 8 MM   Use as directed with Lantus   Next Dose Due:  DO NOT take at this time. Will be addressed at follow up appointment.   Notes to Patient:  Please see Dr. Cano notes regarding this medication in the discharge paperwork                                 labetalol 300 MG tablet   Commonly known as:  NORMODYNE   Take 150 mg by mouth 2 times daily Take 1/2 tablet (150mg) =300mg. Hold for systolic BP less than 120.   Last time this was given:  150 mg on 1/25/2018  9:49 AM   Next Dose Due:  Tonight 1/25/18 at bedtime                                        lactobacillus rhamnosus (GG) capsule   Take 1 capsule by mouth 2 times daily   Last time this was given:  Not given during this hospitalization   Next Dose Due:  Resume home regimen                                      levETIRAcetam 1000 MG Tabs   Take 1,000 mg by mouth 2 times daily   Last time this was given:  1,000 mg on 1/25/2018  9:50 AM   Notes to Patient:  This is the only new medication this hospitalization. This medication is used to help prevent seizures. It is important to take this medication as prescribed & on time. If doses are missed, it increases your chances of having a seizure--please see  Instructions included with the mecication if dose was missed.             9AM               9PM           levothyroxine 50 MCG tablet   Commonly known as:  SYNTHROID/LEVOTHROID   TAKE ONE TABLET BY MOUTH EVERY DAY   Last time this was given:  50 mcg on 1/25/2018  9:49 AM   Next Dose Due:  Tomorrow 1/26/18 morning                                    lidocaine 2 % topical gel   Commonly known as:  XYLOCAINE   Apply topically as needed for moderate pain 10 ML every 3 weeks   Last time this was given:  Not given this hospitalization   Next Dose Due:  As needed                                   loratadine 10 MG tablet   Commonly known as:  CLARITIN   Take 1 tablet (10 mg) by mouth daily   Last time this was given:  Not given during this hospitalization     Next Dose Due:  Tomorrow morning 1/26/18                                   order for DME   Equipment being ordered: Johnson Catheters - 18 french                                * order for DME   Equipment being ordered: Blue Chux Please send to [facility]                                * order for DME   Equipment being ordered: Ahuja FX CPAP interface (nasal pillows), size XS.                                * order for DME   Equipment being ordered: Silver Impregnated catheters HX of frequent UTI                                * order for DME   High back guerrero wheel chair MICHELLE 99 DX: morbid obesity, generalized weakness, physical deconditioning, chronic vertigo                                pravastatin 10 MG tablet   Commonly known as:  PRAVACHOL   TAKE ONE TABLET BY MOUTH EVERY DAY   Last time this was given:  Not given during this hospitalization     Next Dose Due:  Tonight at bedtime 1/25/18                                   predniSONE 5 MG tablet   Commonly known as:  DELTASONE   Take 1 tablet (5 mg) by mouth daily   Last time this was given:  5 mg on 1/25/2018  9:49 AM   Next Dose Due:  Tomorrow 1/25/18 morning                                     simethicone 125 MG Chew chewable tablet   Commonly known as:  MYLICON   Take 1 tablet (125 mg) by mouth as needed for intestinal gas   Last time this was given:  Not given during this hospitalization   Next Dose Due:  As needed for gas discomfort                                   TACROLIMUS PO   Take 2.5 mg by mouth 2 times daily   Last time this was given:  Received last dose today 1/25/18 at 6AM   Next Dose Due:  Next dose is today at 6pm 1/26/18                                      VITAMIN D (CHOLECALCIFEROL) PO   Take 4,000 Units by mouth 2 times daily   Last time this was given:  Not given during this hospitalization   Next Dose Due:  Resume home regimen                                      * Notice:  This list has 4 medication(s) that are the same as other medications prescribed for you. Read the directions carefully, and ask your doctor or other care provider to review them with you.              More Information        Patient Education    Levetiracetam Oral solution    Levetiracetam Oral  tablet    Levetiracetam Oral tablet, extended-release    Levetiracetam Solution for injection  Levetiracetam Oral tablet  What is this medicine?  LEVETIRACETAM (rajinder gupta) is an antiepileptic drug. It is used with other medicines to treat certain types of seizures.  This medicine may be used for other purposes; ask your health care provider or pharmacist if you have questions.  What should I tell my health care provider before I take this medicine?  They need to know if you have any of these conditions:    kidney disease    suicidal thoughts, plans, or attempt; a previous suicide attempt by you or a family member    an unusual or allergic reaction to levetiracetam, other medicines, foods, dyes, or preservatives    pregnant or trying to get pregnant    breast-feeding  How should I use this medicine?  Take this medicine by mouth with a glass of water. Follow the directions on the prescription label. Swallow the tablets whole. Do not crush or chew this medicine. You may take this medicine with or without food. Take your doses at regular intervals. Do not take your medicine more often than directed. Do not stop taking this medicine or any of your seizure medicines unless instructed by your doctor or health care professional. Stopping your medicine suddenly can increase your seizures or their severity.  A special MedGuide will be given to you by the pharmacist with each prescription and refill. Be sure to read this information carefully each time.  Contact your pediatrician or health care professional regarding the use of this medication in children. While this drug may be prescribed for children as young as 4 years of age for selected conditions, precautions do apply.  Overdosage: If you think you have taken too much of this medicine contact a poison control center or emergency room at once.  NOTE: This medicine is only for you. Do not share this medicine with others.  What if I miss a dose?  If you miss a  dose, take it as soon as you can. If it is almost time for your next dose, take only that dose. Do not take double or extra doses.  What may interact with this medicine?  This medicine may interact with the following medications:    carbamazepine    colesevelam    probenecid    sevelamer  This list may not describe all possible interactions. Give your health care provider a list of all the medicines, herbs, non-prescription drugs, or dietary supplements you use. Also tell them if you smoke, drink alcohol, or use illegal drugs. Some items may interact with your medicine.  What should I watch for while using this medicine?  Visit your doctor or health care professional for a regular check on your progress. Wear a medical identification bracelet or chain to say you have epilepsy, and carry a card that lists all your medications.  It is important to take this medicine exactly as instructed by your health care professional. When first starting treatment, your dose may need to be adjusted. It may take weeks or months before your dose is stable. You should contact your doctor or health care professional if your seizures get worse or if you have any new types of seizures.  You may get drowsy or dizzy. Do not drive, use machinery, or do anything that needs mental alertness until you know how this medicine affects you. Do not stand or sit up quickly, especially if you are an older patient. This reduces the risk of dizzy or fainting spells. Alcohol may interfere with the effect of this medicine. Avoid alcoholic drinks.  The use of this medicine may increase the chance of suicidal thoughts or actions. Pay special attention to how you are responding while on this medicine. Any worsening of mood, or thoughts of suicide or dying should be reported to your health care professional right away.  Women who become pregnant while using this medicine may enroll in the [place] Antiepileptic Drug Pregnancy Registry by calling  5-756-341-2573. This registry collects information about the safety of antiepileptic drug use during pregnancy.  What side effects may I notice from receiving this medicine?  Side effects you should report to your doctor or health care professional as soon as possible:    allergic reactions like skin rash, itching or hives, swelling of the face, lips, or tongue    breathing problems    dark urine    general ill feeling or flu-like symptoms    problems with balance, talking, walking    unusually weak or tired    worsening of mood, thoughts or actions of suicide or dying    yellowing of the eyes or skin  Side effects that usually do not require medical attention (report to your doctor or health care professional if they continue or are bothersome):    diarrhea    dizzy, drowsy    headache    loss of appetite  This list may not describe all possible side effects. Call your doctor for medical advice about side effects. You may report side effects to FDA at 8-769-FDA-9032.  Where should I keep my medicine?  Keep out of reach of children.  Store at room temperature between 15 and 30 degrees C (59 and 86 degrees F). Throw away any unused medicine after the expiration date.  NOTE:This sheet is a summary. It may not cover all possible information. If you have questions about this medicine, talk to your doctor, pharmacist, or health care provider. Copyright  2016 [employer] Standard

## 2018-01-18 NOTE — PHARMACY-ADMISSION MEDICATION HISTORY
Admission medication history interview status for the 1/18/2018  admission is complete. See EPIC admission navigator for prior to admission medications     Medication history source reliability:Good    Actions taken by pharmacist (provider contacted, etc): [facility] discharge notes fsh 1-15-18, called [facility] to verify Tacrolimus dose     Additional medication history information not noted on PTA med list :None    Medication reconciliation/reorder completed by provider prior to medication history? No    Time spent in this activity: 20 min    Prior to Admission medications    Medication Sig Last Dose Taking? Auth Provider   TACROLIMUS PO Take 2.5 mg by mouth 2 times daily unk Yes Unknown, Entered By History   cephALEXin (KEFLEX) 500 MG capsule Take 1 capsule (500 mg) by mouth 4 times daily for 7 days 1/18/2018 at Unknown time Yes Victoriano Arellano,    VITAMIN D, CHOLECALCIFEROL, PO Take 4,000 Units by mouth 2 times daily unk Yes Unknown, Entered By History   baclofen (LIORESAL) 10 MG tablet Take 1 tablet (10 mg) by mouth 3 times daily as needed for muscle spasms 1/18/2018 at Unknown time Yes Marivel Barcenas APRN CNP   gabapentin (NEURONTIN) 300 MG capsule Take 3 capsules (900 mg) by mouth 2 times daily For pain unk Yes Marivel Barcenas APRN CNP   lidocaine (XYLOCAINE) 2 % topical gel Apply topically as needed for moderate pain 10 ML every 3 weeks unk Yes Marivel Barcenas APRN CNP   guaiFENesin (ROBITUSSIN) 100 MG/5ML SYRP Take 10-20 mLs by mouth every 4 hours as needed for cough unk Yes Marivel Barcenas APRN CNP   levothyroxine (SYNTHROID/LEVOTHROID) 50 MCG tablet TAKE ONE TABLET BY MOUTH EVERY DAY unk Yes Marivel Barcenas APRN CNP   diphenoxylate-atropine (LOMOTIL) 2.5-0.025 MG per tablet Take 1 tablet by mouth 4 times daily as needed for diarrhea unk Yes Brittani Ortiz MD   meclizine (ANTIVERT) 12.5 MG tablet Take 1 tablet (12.5  mg) by mouth 3 times daily as needed for dizziness unk Yes Brittani Ortiz MD   cloNIDine (CATAPRES) 0.1 MG tablet TAKE ONE TABLET BY MOUTH TWICE A DAY unk Yes Marivel Barcenas APRN CNP   predniSONE (DELTASONE) 5 MG tablet Take 1 tablet (5 mg) by mouth daily unk Yes Marivel Barcenas APRN CNP   insulin glargine (LANTUS SOLOSTAR) 100 UNIT/ML injection Inject 24 units under the skin daily.  Patient taking differently: Inject 24 Units Subcutaneous At Bedtime Inject 24 units under the skin daily. unk Yes Marivel Barcenas APRN CNP   insulin aspart (NOVOLOG FLEXPEN) 100 UNIT/ML injection Inject 10 units under the skin 2 times daily before meals as directed. Add sliding scale as needed. Avg use 25 units per day  Patient taking differently: Inject 10 units under the skin 2 times daily before meals as directed. Add sliding scale as needed.  2 units for every 50 over 201.  Avg use 25 units per day unk Yes Marivel Barcenas APRN CNP   labetalol (NORMODYNE) 300 MG tablet Take 150 mg by mouth 2 times daily Take 1/2 tablet (150mg) =300mg. Hold for systolic BP less than 120. unk Yes    loratadine (CLARITIN) 10 MG tablet Take 1 tablet (10 mg) by mouth daily unk Yes Marivel Barcenas APRN CNP   pravastatin (PRAVACHOL) 10 MG tablet TAKE ONE TABLET BY MOUTH EVERY DAY unk Yes Marivel Barcenas APRN CNP   OXcarbazepine (TRILEPTAL) 300 MG tablet Take 1 tablet (300 mg) by mouth 2 times daily For pain unk Yes    aspirin 81 MG chewable tablet Take 1 tablet (81 mg) by mouth daily Starting 1 week after your kidney biopsy unk Yes Christiano Webber MD   cyanocobalamin (VITAMIN B12) 1000 MCG/ML injection Inject 1 mL (1,000 mcg) into the muscle every 30 days unk Yes Marivel Barcenas APRN CNP   TraMADol HCl 50 MG TBDP Take 50 mg by mouth 3 times daily as needed 1/18/2018 at Unknown time Yes Marivel Barcenas APRN CNP   ondansetron (ZOFRAN ODT) 4 MG  disintegrating tablet Take 1-2 tablets (4-8 mg) by mouth every 8 hours as needed for nausea unk Yes Marivel Barcenas APRN CNP   simethicone (MYLICON) 125 MG CHEW Take 1 tablet (125 mg) by mouth as needed for intestinal gas unk Yes    Lactobacillus Rhamnosus, GG, (CULTURELL) capsule Take 1 capsule by mouth 2 times daily unk Yes She Siddiqui MD   Cranberry 400 MG TABS Take 400 mg by mouth 2 times daily unk Yes Unknown, Entered By History   famotidine (PEPCID) 20 MG tablet Take 1 tablet (20 mg) by mouth 2 times daily unk Yes Marivel Barcenas APRN CNP   ACETAMINOPHEN PO Take 325-650 mg by mouth every 4 hours as needed. unk Yes Unknown, Entered By History   order for DME High back guerrero wheel chair  MICHELLE 99  DX: morbid obesity, generalized weakness, physical deconditioning, chronic vertigo   Marivel Barcenas APRN CNP   blood glucose monitoring (ACCU-CHEK COMPACT PLUS) test strip Use to test blood sugar 3 times daily or as directed.  Ok to substitute alternative if insurance prefers.      order for DME Equipment being ordered: Silver Impregnated catheters  HX of frequent UTI   Marivel Barcenas APRN CNP   order for DME Equipment being ordered: Ahuja FX CPAP interface (nasal pillows), size XS.   Lorenzo Choudhury MD   insulin pen needle 30G X 8 MM Use as directed with Marivel Westfall APRN CNP   order for DME Equipment being ordered: Blue Perx  Please send to [facility]   Marivel Barcenas APRN CNP   ORDER FOR DME Equipment being ordered: Johnson Catheters - 18 french   Marivel Barcenas APRN CNP

## 2018-01-18 NOTE — ED PROVIDER NOTES
History     Chief Complaint:  Altered Mental Status    The history is limited by the condition of the patient.      Gem Jade is a 72 year old female with a complex medical history including metabolic encephalopathy 2/2 urinary tract infection, catheter associated urinary tract infection resistant to VRE, ESBL, and klebsiella with an indwelling rico catheter, sepsis, chronic renal failure s/p renal transplant on chronic immunosuppression, type 2 diabetes, hypertension, hyperlipidemia, hypothyroidism, and chronic anemia who presents to the emergency department today via EMS for evaluation of altered mental status. The patient has a history of sepsis secondary to urinary tract infection and was admitted in December for this, then admitted once again on 1/11/2018 with encephalopathy secondary to UTI. During her stay, she required intubation and improved with treatment of her UTI. Her last culture grew out E. Coli which was pan sensitive. The patient lives at home with her  who is her primary caretaker. Per EMS, he states that the patient has been unresponsive since he woke up this morning, but is unclear as to why he waited to call EMS.  Upon EMS arrival, the patient was responsive only to painful stimuli, but was breathing on her own. En route, the patient began having apneic episodes with an oxygen saturation lowest at 85%, so she was bagged. EMS tried 0.4 mg Narcan without change in mental status, and established an IV in her wrist. Blood glucose was 303 per EMS.    Allergies:  Bactrim Ds [Sulfamethoxazole W/Trimethoprim]  Atorvastatin Calcium  Codeine  Darvocet [Propoxyphene N-Apap]  Hydromorphone  Levofloxacin  Morphine  Niaspan [Niacin]  Percocet [Oxycodone-Acetaminophen]  Vicodin [Hydrocodone-Acetaminophen]    Medications:    Tacrolimus  Keflex  Baclofen  Gabapentin  Robitussin  Levothyroxine  Lomotil  Meclizine  Clonidine  Prednisone  Insulin injection, glorgine and  aspart  Labetalol  Loratadine  Pravastatin  Trileptal  Aspirin 81  Tramadol  Zofran  Simethicone  Lactobacillus  Pepcid  Insulin pen needle  Blood glucose monitoring strip    Past Medical History:    Background diabetic retinopathy(362.01)   Diabetic gastroparesis (H)   Diarrhea   Dizziness and giddiness   Hyponatremia   Kidney replaced by transplant   Mixed hyperlipidemia   Obesity   Osteopenia   Other and unspecified hyperlipidemia   Other pulmonary embolism and infarction   Polyneuropathy in diabetes(357.2)   Primary localized osteoarthrosis, lower leg   Pyelonephritis, unspecified   Type 2 diabetes mellitus with complications (H)   Urinary tract infection, site not specified   UTI (urinary tract infection) due to urinary indwelling catheter (H)   Encephalopathy secondary to UTI  Sepsis secondary to UTI    Past Surgical History:    Kidney transplant  Total right knee replacement  Cholecystectomy  Cardiac cath, left heart, negative x2  Bilateral cataract repair    Family History:    Diabetes-mother, brother  Hypertension-father  Heart disease-father    Social History:  The patient was accompanied to the ED by EMS.  Smoking Status: Former smoker  Alcohol Use: No  Marital Status:   [2]    Review of Systems   Unable to perform ROS: Acuity of condition       Physical Exam   Patient Vitals for the past 24 hrs:   BP Temp Temp src Pulse Heart Rate Resp SpO2 Weight   01/18/18 1642 - - - - 69 16 100 % -   01/18/18 1635 - - - - 71 (!) 80 100 % -   01/18/18 1634 176/64 - - - 71 15 100 % -   01/18/18 1615 171/44 - - - 70 13 100 % -   01/18/18 1600 168/47 - - - 67 13 100 % -   01/18/18 1545 (!) 165/34 - - - 68 14 100 % -   01/18/18 1530 156/75 - - - 69 14 100 % -   01/18/18 1515 158/72 - - - 69 13 100 % -   01/18/18 1501 - 96  F (35.6  C) Temporal - - - - 109 kg (240 lb 4.8 oz)   01/18/18 1500 - - - - 71 15 100 % -   01/18/18 1459 135/66 - - - - - - -   01/18/18 1445 (!) 121/99 - - - 75 14 100 % -   01/18/18 1440 (!)  153/104 - - - 80 23 100 % -   01/18/18 1437 (!) 157/109 - - - 77 13 100 % -   01/18/18 1430 - - - - 81 10 100 % -   01/18/18 1429 147/90 - - - 80 9 100 % -   01/18/18 1422 (!) 132/92 - - - 77 (!) 7 - -   01/18/18 1418 161/81 - - 80 - - 100 % -   01/18/18 1417 161/81 - - - 81 22 93 % -       Physical Exam  General: Markedly elevated BMI. Unresponsive. Not following commands. No moving extremities. She could moan and open her eyes to voice. See GCS below. The patient is being bagged on arrival with a nasal trumpet in place.  Head:  The scalp, face, and head appear normal  Eyes:  The pupils are equal, round, and reactive to light    There is no nystagmus    Extraocular muscles are intact    Conjunctivae and sclerae are normal  ENT:    The nose is normal    Pinnae are normal    The oropharynx is normal, limited mouth opening. Large tongue. Mallampati 4.    Uvula is in the midline  Neck:  Normal range of motion    There is no rigidity noted    There is no midline cervical spine pain/tenderness    Trachea is in the midline    No mass is detected  CV:  Regular rate and underlying rhythm     Normal S1/S2, no S3/S4    No pathological murmur detected  Resp:  Lungs are clear, hypoventilation is noted.    There is no tachypnea    Non-labored    No rales    No wheezing   GI:  Abdomen is soft, there is no rigidity    No distension    No tympani    No rebound tenderness     Prior upper and lower abdominal surgical scars, well healed.  :  Johnson catheter in place draining yellow, fairly cloudy urine.  MS:  Normal muscular tone    Symmetric motor strength    No major joint effusions    No asymmetric leg swelling, no calf tenderness    Right knee replacement scar, well healed.  Skin:  No rash or acute skin lesions noted  Neuro: GCS Score: 9    Eye opening:    3. To command     Verbal activity:     2. Incomprehensible    Motor activity:    4. Withdraws to pain    Total score: 9  Psych:  Unresponsive.  Lymph: No anterior cervical  "lymphadenopathy noted    Emergency Department Course     Imaging:  Radiology findings were communicated with the Admitting MD who voiced understanding of the findings.    Chest XR, Port 1 View  New ET tube extends into the right mainstem bronchus.  Recommend retraction. The lungs remain aerated without evidence of  significant collapse. Cardiac silhouette borderline enlarged.  Reading per radiology    Head CT w/o contrast  No acute pathology. No bleed, mass, or infarcts are seen.  Reading per radiology    Chest XR, Port 1 View-Repeat  ET tube has been retracted and now projects over the mid  trachea. New enteric tube projects over the left upper quadrant.  No focal airspace opacity. No pneumothorax visualized. Cardiac  silhouette remains borderline-enlarged.  Reading per radiology    Laboratory:  Laboratory findings were communicated with the Admitting MD who voiced understanding of the findings.    Initial CBC: Canceled-quantity not sufficient  Initial CMP: Canceled-quantity not sufficient    CBC: WBC 4.7, HGB 10.9 (L),  (L)  CMP: Glucose 264 (H), GFR 55 (L), Albumin 2.8 (L) o/w WNL (Creatinine 0.99)  Blood culture: Pending x2  ABG and oxyhgb: pO2 169 (H) o/w WNL  Lactic Acid (Collected at 1420): 0.9    UA with micro: Glucose 150 (A), Trace blood (A), Protein albumin 100 (A), Leukocyte esterase large (A), WBC/HPF >182 (H), RBC/ (H), WBC clumps present (A), Many yeast (A) o/w negative  Urine culture: Pending    Procedures:     Intubation      INDICATION: Airway protection.    PERFORMED BY: Dr. Armen Sauceda    CONSENT: The procedure was performed in an emergent situation.    TIMEOUT: Immediately prior to procedure a \"time out\" was called to verify the correct patient, procedure, equipment, support staff and site/side marked as required.    INTUBATION METHOD: Direct laryngoscopy after an attempt with a glidescope where chords were visualized, but could not pass.    PATIENT STATUS: RSI    PREOXYGENATION: " Mask and nasal cannula    PRETREATMENT MEDICATIONS: None    SEDATIVES: Etomidate    PARALYTIC: succinylcholine, followed by Vecuronium    LARYNGOSCOPE SIZE: Mac 4    TUBE SIZE: 7.5 cuffed with cuff inflated after placement  Number of attempts: 1  Cricoid pressure: yes  Cords visualized: yes    POST-PROCEDURE ASSESSMENT: Breath sounds were initially unequal, indicating right main stem intubation. The tube was pulled back three cm. Breath sounds equal bilaterally with chest rise and absent over the epigastrium and Chest x-ray interpreted by me demonstrating endotracheal tube in appropriate position. End tidal CO2 was confirmed with inline monitor.    ETT TO TEETH: 24 cm  Tube secured with: ETT smith    Patient tolerated the procedure well with no immediate complications.  COMPLICATIONS:  None      Limited Bedside Screening Ultrasound     PERFORMED BY: Dr. Armen Sauceda  BODY AREA IMAGED: Cardiac ultrasound  INDICATIONS: Poorly responsive.  FINDINGS: No pericardial effusion. Normal sized heart.       Fast Ultrasound     INDICATION: Unresponsive.    PERFORMING PHYSICIAN: Dr. Armen Sauceda    TECHNIQUE:  Four quadrants (perihepatic, perisplenic, pelvic, pericardial) of the abdomen were scanned.    FINDINGS: No obvious free fluid in Lind's pouch. No free fluid in the pelvis. No splenal renal fluid.     NOTES: The quality of the exam was good.  The images were printed and attached for inclusion in the patients medical record.    Interventions:  See the emergency department course below.    Emergency Department Course:  1408: The patient arrived and I performed an exam of the patient as documented above. Bagging was continued.  1414: The patient was placed on 6 liters of oxygen via bagging.  1414: The patient had an oxygen saturation of 100% with a heart rate of 81.  1420: IV was inserted and blood was drawn for laboratory testing, results above.  1425: The patient was given 20 mg etomidate IV and 200 mg succinylcholine  IV.  1426: Intubation was attempted with a glidescope, but was unsuccessful. Bagging resumed.  1428: The patient was intubated by direct laryngoscopy, alongside suctioning.  1432: ns 1 L IV  1435: Narcan 1 mg IV  1436: Ultrasounds were performed as above.  1439: 60 mg of propofol and 10 mg of vecuronium were administered by IV for continuous sedation.  1440: 40 mg of propofol were administered by IV.  1440: Portable chest x-ray was obtained while in the emergency department, results above.   1445: The patient provided a urine sample here in the emergency department. This was sent for laboratory testing, findings above.  1450: The patient was sent for a head CT w/o contrast while in the emergency department, results above.   1506: I spoke with Dr. David of the radiology service from [facility] regarding patient's findings.  1507: I discussed the patient with Dr. Ahmadi of the intensivist service, who will admit the patient to a monitored ICU bed for further evaluation and treatment.  1513: I rechecked the patient.  1532: Blood was drawn for laboratory testing, results above.  1535: I rechecked the patient.  1538: Meropenum 1 g in ns IV  1555: NG tube was placed by nursing.  1620: Repeat portable chest x-ray was obtained while in the emergency department, results above. This showed improved location of the ET tube and good placement of the NG tube.  1623: ns 1 L IV  1644: Patient was transferred to the ICU.    Impression & Plan      CMS Diagnoses:   The patient has signs of Severe Sepsis as evidenced by:    1. 2 SIRS criteria, AND  2. Suspected infection, AND   3. Organ dysfunction: Acute encephalopathy due to sepsis  Time severe sepsis diagnosis confirmed = 1611 as this was the time when the urine resulted. The patient presented with acute encephalopathy, but infection was not apparent until this time.      3 Hour Severe Sepsis Bundle Completion:  1. Initial Lactic Acid Result:   Recent Labs   Lab Test   01/18/18   1420  01/11/18   1502  12/19/17   1745   LACT  0.9  0.7  0.6*     2. Blood Cultures before Antibiotics: Yes  3. Broad Spectrum Antibiotics Administered: Yes (Meropenum 1 gram)  4. 2000 mLs (This exceeds 30 cc's/kg based on ideal body weight of 60 kg) ml of IV fluids.       6 Hour Severe Sepsis Bundle Completion:  1. Repeat Lactic Acid was not performed secondary to the first one being completely normal.  2. MAP>65 after initial IVF bolus, will continue to monitor fluid status and vital signs  I attest to having performed a repeat sepsis exam and assessment of perfusion at 1628 and the results demonstrate no change.    This patient only meets criteria for severe sepsis based on infection and encephalopathy. There were never issues with blood pressure, pulse, perfusion, or elevated lactate.     Medical Decision Making: This patient presents largely unresponsive, and EMS history suggest this is been going on since at least this morning.  It is unclear why 911 was not activated sooner.  The patient was recently discharged after an admission for E. coli urinary tract infection, catheter induced, and the patient did become encephalopathic requiring intensive care unit transfer and intubation short-term.  The patient today was found to be hypoxic on arrival.  She was getting bagged with bag valve mask and a nasal trumpet en route.  She was intubated for airway protection given low GCS.  The patient's blood pressures of all been normal here.  There is no evidence of septic shock based on lactate or vital signs.  The patient's urine is cloudy.  Antibiotics were given immediately.  Arterial blood gas after intubation reveals a pH of 7.43 and a PCO2 of 39.  Her respiratory status is reasonable at this time.  Urinalysis does reveal significant UTI with pyuria and hematuria consistent with hemorrhagic cystitis.  Yeast is also noted.  White blood cell clumps are noted.  This is likely urinary tract infection with  encephalopathy.  The patient does meet criteria for severe sepsis as the patient has urinary tract infection initial hypoxia and encephalopathy.  The patient's lactate and blood pressures do not support a diagnosis of septic shock.  The patient's ideal body weight is much lower than her current weight.  Broad-spectrum antibiotics were given immediately.     Critical Care time was 85 minutes for this patient excluding procedures.     Diagnosis:    ICD-10-CM    1. Unresponsive R41.89 Comprehensive metabolic panel   Urinary tract infection  Acute respiratory failure  Severe sepsis      Disposition:   Admission to ICU    Scribe Disclosure:  Desi LU, am serving as a scribe at 2:08 PM on 1/18/2018 to document services personally performed by Armen Sauceda MD, based on my observations and the provider's statements to me.    1/18/2018    EMERGENCY DEPARTMENT       Armen Sauceda MD  01/18/18 [id number]

## 2018-01-18 NOTE — ED NOTES
Bed: ST01  Expected date:   Expected time:   Means of arrival:   Comments:  kailee - 516 - 72 F unresponsive etA 1410

## 2018-01-18 NOTE — H&P
"Critical Care  Note      01/18/2018    Name: Gem Jade MRN#: [id number]   Age: 72 year old YOB: 1945     [facility]  [facility] #    MV DAY #             Problem List:   Active Problems:    Vancomycin resistant Enterococcus    Altered mental state    UTI (urinary tract infection)    Sepsis (H)    Encephalopathy acute           Summary/Hospital Course:   Ms. Gem Jade is a 72 yof with a history of neurogenic bladder with chronic indwelling rico, recurrent UTIs, and kidney transplant (1999) maintained on tacrolimus and 5 mg prednisone who was recently admitted to the ICU and [facility] hospital 1/11 through 1/15in the setting of possible status epilepticus (NCSE) requiring intubation.     Prior hospital course - \"Briefly, she was admitted to the hospital on  with altered mental status which was thought to be 2/2 hypoglycemia with glucose 28.  Her rico catheter was exchanged in the ED given dirty UA and long history of recurrent UTIs.  She was tested for influenza and found to be negative.  CT head was obtained and negative for acute pathology.  She was admitted to the floor and underwent ID work up and consult and well as neuro consult for persistently altered mental status.  Infection was felt to be unlikely due to normal WBC, normal procal and absence of fevers. Altered mental status was persistent, EEG was obtained and she was found to be status epilepticus necessitating transfer to the ICU for further cares.  On arrival to the ICU her eyes were open but she was not tracking.  She withdrew to nailbed pressure but was otherwise unresponsive.  The decision was made to intubate for airway protection.\"     She was treated for infection with meropenem and then ceftriaxone with improvement in MS and extubation and ultimately discharged 1/15.     Patient who lives at home was unresponsive by her  but delay in EMS call (per ED notes) Upon EMS arrival, the patient was responsive only to painful " stimuli, but was breathing on her own. En route, the patient began having apneic episodes with an oxygen saturation lowest at 85%, so she was bagged. EMS tried 0.4 mg Narcan without change in mental status, and established an IV in her wrist.      She was intubated on arrival to ICU for GCS 9 but inability to protect airway. FAST exam negative, bedside ECHO with no overt abnormality. ABG was without hypercarbia UA was dirty thus presumption was encephalopathy 2/2 infection. She was bolused fluid, meropenem as well propofol and admitted to ICU    In ICU off propofol she is moving extremities spontaneously however not responsive to verbal or painful stimuli.           Assessment and plan :   Neurology/Psychiatry:   1. Acute encephalopathy - repeat presentation, seems metabolic in nature, was hypoglycemic on prior admission, had UTI recently treated - no outward signs of sepsis but immunocompromised host,  Possible seizure though last EEG negative, possible polypharmacy with sedative meds,    2. Analgesia/Anxiety  Plan  -Hold home baclofen gabapentin Trileptal and tramadol  - check NH3  - propofol gtt initiated - wean , goal RASS 0   - Neuro crit consult - defer imaging and EEG to them        Cardiovascular:   1.Hemodynamics -appropriate, HTN  - noted descrepancy between upper and lower extremity BP  2.Rhythm -sinus  3. Ischemia -no evidence  Plan   -Continue prior to admission aspirin clonidine and labetalol pravastatin  - Consider  as needed's if systolic greater than 180 (lower extremities)     Pulmonary/Ventilator Management:   1.  Acute hypoxic respiratory failure secondary to encephalopathy -   Plan  - incentive spirometry  - supplemental O2       GI and Nutrition :   1. No active issues   2. NPO  Plan  - advance diet  - aspiration precautions        Renal/Fluids/Electrolytes:   1. CKD - Stage IV CKD >  ESRD , Hx of renal transplant   2. Neurogenic bladder with chronic rico (exchanged 1/11 from last  admission)  3. Electrolyte abnormality -   3. Acid/base status  4. Volume status  Plan  - continue tacrolimus and prednisone  - tacro levels per renal/pharmacy   - renal following   - monitor function and electrolytes as needed with replacement per ICU protocols.  - generally avoid nephrotoxic agents such as NSAID, IV contrast unless specifically required  - adjust medications as needed for renal clearance  - follow I/O's as appropriate.     Infectious Disease:   1.  Chronic recurrent urinary tract infections with chronic indwelling Johnson, in an immunocompromised host ;s/p recent course abx 1/11 - 1/15 , dosed meropenem in ED, UA still   - priro CT with hydronephrosis in transplanted kidney with suggestion of stone  2. Hx of VRE and ESBL  Plan  - FUP cultures  - check procal  - continue empiric meropenem  - CT ABD/pelvis - evaluate for pyelonephritis  -     Endocrine:   1. Dysglycemia - prior admission was hypoglycemic, not checked PTA in ED high  2. Hypothyroidism  3. Hyperparathyroidism- calcium stable  Plan  - ICU insulin protocol, goal sugar <180  - hold off long acting insulin-   - continue home levothyroxine      Hematology/Oncology:   1. Chronic Anemia, no signs, symptoms of active blood loss  2. Hx of PE  Plan  - serial CBC coags  - DVT prophylaxis        MSKL/Rheum:  1. No active issues   Plan  - wound care per unit routine         IV/Access:   1. Venous access - PIV   2. Arterial access -  none  Plan  - central access required and necessary        ICU Prophylaxis:   1. DVT: Hep subq   2. VAP: HOB 30 degrees, chlorhexidine rinse  3. Stress Ulcer: PPI  4. Restraints: Nonviolent soft two point restraints required and necessary for patient safety and continued cares and good effect as patient continues to pull at necessary lines, tubes despite education and distraction. Will readdress daily.   5. Wound care - per unit routine   6. Feeding - NPO  7. Family Update:at bedside   8. Disposition - ICU              Medical History:     Past Medical History:   Diagnosis Date     Background diabetic retinopathy(362.01)     extensive diabetic retinopathy, s/p multiple laser treatments     Diabetic gastroparesis (H)     followed by [facility] (Dr. 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)      Urinary tract infection, site not specified     Chronic UTIs     UTI (urinary tract infection) due to urinary indwelling catheter (H)      Past Surgical History:   Procedure Laterality Date     C NONSPECIFIC PROCEDURE  10/99    kidney transplant     C NONSPECIFIC PROCEDURE  8/00    MRI head, lumbar spine, pelvis     C TOTAL KNEE ARTHROPLASTY  3/03    Knee Replacement, Total right, post op PE     CHOLECYSTECTOMY       HC LEFT HEART CATHETERIZATION  1999    Cardiac Cath, Left Heart, Negative  (@ [facility])     HC LEFT HEART CATHETERIZATION  4/05    normal coronary angiogram at [facility], had mild troponin rise (0.71)     HC REMV CATARACT EXTRACAP,INSERT LENS  8/04    bilateral cataract repaired, left eye      Social History     Social History     Marital status:      Spouse name: N/A     Number of children: N/A     Years of education: N/A     Occupational History     Not on file.     Social History Main Topics     Smoking status: Former Smoker     Years: 17.00     Quit date: 6/1/1987     Smokeless tobacco: Not on file     Alcohol use No     Drug use: No     Sexual activity: Yes     Partners: Male     Other Topics Concern     Not on file     Social History Narrative        Allergies    Allergen Reactions     Bactrim Ds [Sulfamethoxazole W/Trimethoprim]      Creatinine level increased     Atorvastatin Calcium      myalgias, muscle aches     Codeine Nausea and Vomiting     Darvocet [Propoxyphene N-Apap] Nausea and Vomiting     Hydromorphone      Levofloxacin Other (See Comments) and Diarrhea     hallucinations     Morphine      Nausea and vomiting     Niaspan [Niacin] GI Disturbance     Flushing even at low dose, GI upset     Percocet [Oxycodone-Acetaminophen]      Vomiting after taking med couple of times     Vicodin [Hydrocodone-Acetaminophen] Nausea and Vomiting              Key Medications:        propofol (DIPRIVAN) infusion Stopped (01/18/18 6187)        Home Meds    No current facility-administered medications on file prior to encounter.   Current Outpatient Prescriptions on File Prior to Encounter:  cephALEXin (KEFLEX) 500 MG capsule Take 1 capsule (500 mg) by mouth 4 times daily for 7 days   VITAMIN D, CHOLECALCIFEROL, PO Take 4,000 Units by mouth 2 times daily   baclofen (LIORESAL) 10 MG tablet Take 1 tablet (10 mg) by mouth 3 times daily as needed for muscle spasms   gabapentin (NEURONTIN) 300 MG capsule Take 3 capsules (900 mg) by mouth 2 times daily For pain   lidocaine (XYLOCAINE) 2 % topical gel Apply topically as needed for moderate pain 10 ML every 3 weeks   guaiFENesin (ROBITUSSIN) 100 MG/5ML SYRP Take 10-20 mLs by mouth every 4 hours as needed for cough   levothyroxine (SYNTHROID/LEVOTHROID) 50 MCG tablet TAKE ONE TABLET BY MOUTH EVERY DAY   diphenoxylate-atropine (LOMOTIL) 2.5-0.025 MG per tablet Take 1 tablet by mouth 4 times daily as needed for diarrhea   meclizine (ANTIVERT) 12.5 MG tablet Take 1 tablet (12.5 mg) by mouth 3 times daily as needed for dizziness   cloNIDine (CATAPRES) 0.1 MG tablet TAKE ONE TABLET BY MOUTH TWICE A DAY   predniSONE (DELTASONE) 5 MG tablet Take 1 tablet (5 mg) by mouth daily   insulin glargine (LANTUS SOLOSTAR) 100 UNIT/ML injection Inject 24  units under the skin daily. (Patient taking differently: Inject 24 Units Subcutaneous At Bedtime Inject 24 units under the skin daily.)   insulin aspart (NOVOLOG FLEXPEN) 100 UNIT/ML injection Inject 10 units under the skin 2 times daily before meals as directed. Add sliding scale as needed. Avg use 25 units per day (Patient taking differently: Inject 10 units under the skin 2 times daily before meals as directed. Add sliding scale as needed.  2 units for every 50 over 201.  Avg use 25 units per day)   labetalol (NORMODYNE) 300 MG tablet Take 150 mg by mouth 2 times daily Take 1/2 tablet (150mg) =300mg. Hold for systolic BP less than 120.   loratadine (CLARITIN) 10 MG tablet Take 1 tablet (10 mg) by mouth daily   pravastatin (PRAVACHOL) 10 MG tablet TAKE ONE TABLET BY MOUTH EVERY DAY   OXcarbazepine (TRILEPTAL) 300 MG tablet Take 1 tablet (300 mg) by mouth 2 times daily For pain   aspirin 81 MG chewable tablet Take 1 tablet (81 mg) by mouth daily Starting 1 week after your kidney biopsy   cyanocobalamin (VITAMIN B12) 1000 MCG/ML injection Inject 1 mL (1,000 mcg) into the muscle every 30 days   TraMADol HCl 50 MG TBDP Take 50 mg by mouth 3 times daily as needed   ondansetron (ZOFRAN ODT) 4 MG disintegrating tablet Take 1-2 tablets (4-8 mg) by mouth every 8 hours as needed for nausea   simethicone (MYLICON) 125 MG CHEW Take 1 tablet (125 mg) by mouth as needed for intestinal gas   Lactobacillus Rhamnosus, GG, (CULTURELL) capsule Take 1 capsule by mouth 2 times daily   Cranberry 400 MG TABS Take 400 mg by mouth 2 times daily   famotidine (PEPCID) 20 MG tablet Take 1 tablet (20 mg) by mouth 2 times daily   ACETAMINOPHEN PO Take 325-650 mg by mouth every 4 hours as needed.   [place] [facility] [place] guerrero wheel chairLON 99DX: morbid obesity, generalized weakness, physical deconditioning, chronic vertigo   blood glucose monitoring (ACCU-CHEK COMPACT PLUS) test strip Use to test blood sugar 3 times daily or as directed.   Ok to substitute alternative if insurance prefers.   order for DME Equipment being ordered: Silver Impregnated cathetersHX of frequent UTI   order for DME Equipment being ordered: Ahuja FX CPAP interface (nasal pillows), size XS.   insulin pen needle 30G X 8 MM Use as directed with Lantus   order for DME Equipment being ordered: Blue PerxPlease [facility] to [facility]   ORDER FOR DME Equipment being ordered: Johnson Catheters - 18 Urdu              Physical Examination:   Temp:  [96  F (35.6  C)] 96  F (35.6  C)  Pulse:  [80] 80  Heart Rate:  [67-81] 69  Resp:  [7-80] 16  BP: (121-176)/() 176/64  FiO2 (%):  [50 %] 50 %  SpO2:  [93 %-100 %] 100 %  No intake or output data in the 24 hours ending 01/18/18 1648  Wt Readings from Last 4 Encounters:   01/18/18 109 kg (240 lb 4.8 oz)   01/14/18 102.9 kg (226 lb 14.4 oz)   12/23/17 108 kg (238 lb)   11/17/17 101.7 kg (224 lb 1.6 oz)     BP - Mean:  [] 119  Ventilation Mode: CMV/AC  FiO2 (%): 50 %  Rate Set (breaths/minute): 14 breaths/min  Tidal Volume Set (mL): 550 mL  PEEP (cm H2O): 100 cmH2O  Pressure Support (cm H2O): 50 cmH2O  Resp: 16    Recent Labs  Lab 01/18/18  1545 01/13/18  1543 01/13/18  0430 01/13/18  0023   PH 7.43 7.36 7.41 7.50*   PCO2 39 41 39 31*   PO2 169* 151* 135* 323*   HCO3 26 23 25 24   O2PER Ventilator 40% 40 80       GEN:  Intubated  Unresponsive  acute distress   HEENT: head ncat, sclera anicteric, OP patent, trachea midline   PULM: unlabored synchronous with vent, clear anteriorly    CV/COR: RRR S1S2 no gallop,  No rub, no murmur of not Upper ext BP 50 point higher than lower extremity BP - each symmetric L and R   ABD: soft nontender, hypoactive bowel sounds, no mass  EXT: no edema,  warm  NEURO:  Unresponsive to verbal and painful stimuli, moving all 4 extremities spontaneously   SKIN: no obvious rash  LINES: clean, dry intact         Data:   All data and imaging reviewed     ROUTINE ICU LABS (Last four results)  CMP  Recent Labs  Lab  01/18/18  1532 01/18/18  1420 01/15/18  0755 01/14/18  0353    Canceled, Test credited 136 139   POTASSIUM 3.5 Canceled, Test credited 3.5 3.6   CHLORIDE 103 Canceled, Test credited 104 107   CO2 26 Canceled, Test credited 21 23   ANIONGAP 9 Canceled, Test credited 11 9   * Canceled, Test credited 96 63*   BUN 13 Canceled, Test credited 12 12   CR 0.99 Canceled, Test credited 0.82 0.88   GFRESTIMATED 55* Canceled, Test credited 69 63   GFRESTBLACK 67 Canceled, Test credited 84 77   ROGER 8.7 Canceled, Test credited 8.4* 7.6*   PROTTOTAL 7.2 Canceled, Test credited  --  6.1*   ALBUMIN 2.8* Canceled, Test credited  --  2.3*   BILITOTAL 0.7 Canceled, Test credited  --  0.4   ALKPHOS 86 Canceled, Test credited  --  67   AST 13 Canceled, Test credited  --  14   ALT 13 Canceled, Test credited  --  11     CBC  Recent Labs  Lab 01/18/18  1532 01/18/18  1420 01/15/18  0755 01/14/18  0353   WBC 4.7 Canceled, Test credited, specimen discarded 5.3 5.7   RBC 3.54* Canceled, Test credited, specimen discarded 3.61* 3.34*   HGB 10.9* Canceled, Test credited, specimen discarded 10.8* 9.9*   HCT 33.1* Canceled, Test credited, specimen discarded 33.9* 30.7*   MCV 94 Canceled, Test credited, specimen discarded 94 92   MCH 30.8 Canceled, Test credited, specimen discarded 29.9 29.6   MCHC 32.9 Canceled, Test credited, specimen discarded 31.9 32.2   RDW 14.1 Canceled, Test credited, specimen discarded 14.3 14.4   * Canceled, Test credited, specimen discarded 134* 122*     INRNo lab results found in last 7 days.  Arterial Blood Gas  Recent Labs  Lab 01/18/18  1545 01/13/18  1543 01/13/18  0430 01/13/18  0023   PH 7.43 7.36 7.41 7.50*   PCO2 39 41 39 31*   PO2 169* 151* 135* 323*   HCO3 26 23 25 24   O2PER Ventilator 40% 40 80       All cultures:  No results for input(s): CULT in the last 168 hours.  Recent Results (from the past 24 hour(s))   XR Chest Port 1 View    Narrative    CHEST PORTABLE ONE VIEW   1/18/2018 2:40 PM      HISTORY: Intubation.     COMPARISON: 1/12/2018.      Impression    IMPRESSION: New ET tube extends into the right mainstem bronchus.  Recommend retraction. The lungs remain aerated without evidence of  significant collapse. Cardiac silhouette borderline enlarged.    Results discussed with Armen Sauceda at 3:05 PM on 1/18/2018.    ANABELA GONGORA MD   Head CT w/o contrast    Narrative    CT SCAN OF THE HEAD WITHOUT CONTRAST   1/18/2018 3:06 PM     HISTORY: Loss of consciousness.    TECHNIQUE:  Axial images of the head and coronal reformations without  IV contrast material. Radiation dose for this scan was reduced using  automated exposure control, adjustment of the mA and/or kV according  to patient size, or iterative reconstruction technique.    COMPARISON: None.    FINDINGS:  There is generalized atrophy of the brain.  White matter  changes are present in the cerebral hemispheres that are consistent  with small vessel ischemic disease in this age patient. There is no  evidence of intracranial hemorrhage, mass, acute infarct or anomaly.  Mucosal thickening is present in the maxillary sinuses. Multiple  ethmoid sinuses are opacified. Mucosal thickening in the sphenoid  sinuses There is no evidence of trauma.      Impression    IMPRESSION: No acute pathology. No bleed, mass, or infarcts are seen.      NAYANA GONSALES MD   Chest  XR, 1 view PORTABLE    Narrative    PORTABLE CHEST ONE VIEW   1/18/2018  4:40 PM     HISTORY: Reassess endotracheal tube and nasogastric tube location.    COMPARISON: Earlier the same day.      Impression    IMPRESSION: ET tube has been retracted and now projects over the mid  trachea. New enteric tube projects over the left upper quadrant.    No focal airspace opacity. No pneumothorax visualized. Cardiac  silhouette remains borderline-enlarged.    ANABELA GONGORA MD         Billing: This patient is critically ill: Yes. Total critical care time today 60 min.

## 2018-01-18 NOTE — IP AVS SNAPSHOT
[facility]    [address]    [place] [address]    Phone:  [phone]                                       After Visit Summary   1/18/2018    Gem Jade    MRN: [id number]           After Visit Summary Signature Page     I have received my discharge instructions, and my questions have been answered. I have discussed any challenges I see with this plan with the nurse or doctor.    ..........................................................................................................................................  Patient/Patient Representative Signature      ..........................................................................................................................................  Patient Representative Print Name and Relationship to Patient    ..................................................               ................................................  Date                                            Time    ..........................................................................................................................................  Reviewed by Signature/Title    ...................................................              ..............................................  Date                                                            Time

## 2018-01-18 NOTE — LETTER
[place] [address] [facility]  [address] [place] [address]  [phone]          January 25, 2018    RE:  Gem Jade                                                                                                                                                       [address] [place] [address]    To whom it may concern:    Gem Jade was admitted to the hospital secondary to Unresponsive, Diabetic polyneuropathy associated with type 2 diabetes mellitus (H), Encephalopathy acute and Status epilepticus (H) . Due to her history of recurrent urinary tract infection , she is advised to use specific silver tip rico cathter .Patient is advised to bring new kit with her to the hospital in case of future hospitalization so it is available for the providers to change the correct catheter.  Appreciate your help with the assistance.      Sincerely,  Sammi Lai MD,[facility]

## 2018-01-18 NOTE — PROGRESS NOTES
Patient intubated with 7.5 ETT, secured at 24cm at the lips. End tidal of 37cm H20. Bilateral breath sounds present. Vent settings: CMV rate 14, tidal volume 550, FiO2 50%, and PEEP 5. Will continue to follow.    Leoncio ELLIS

## 2018-01-18 NOTE — TELEPHONE ENCOUNTER
TC to , EMT present, patient was responsive earlier today but now  can not wake her up for her meds or to eat, RBG is 294, pupils are pinpoint and unresponsive,   given a tramadol and baclofen earlier for pain, and given her antibiotic Keflex, recent hospitalization for urosepsis and metabolic encephalopathy just discharged on Monday 1/15,  EMT will start an IV and transport to [facility],  I will follow her case through [facility]    Klaudia MCFARLANE

## 2018-01-18 NOTE — TELEPHONE ENCOUNTER
"Patient's  called; he thinks patient has another UTI and he is considering calling 911.  He said he cannot get the patient to wake up or stay awake.  Reports urine output as \"ok\".    Patient has phone visit with Klaudia today at 3:30, but he doesn't think \"we can wait that long\".    Please call back to [phone], if able to before 3:30      "

## 2018-01-19 ENCOUNTER — APPOINTMENT (OUTPATIENT)
Dept: MRI IMAGING | Facility: CLINIC | Age: 73
DRG: 100 | End: 2018-01-19
Attending: PSYCHIATRY & NEUROLOGY
Payer: MEDICARE

## 2018-01-19 LAB
ACETAMINOPHEN QUAL: NEGATIVE
ALBUMIN SERPL-MCNC: 2.6 G/DL (ref 3.4–5)
ALP SERPL-CCNC: 92 U/L (ref 40–150)
ALT SERPL W P-5'-P-CCNC: 15 U/L (ref 0–50)
AMANTADINE: NEGATIVE
AMITRIPTYLINE QUAL: NEGATIVE
AMOXAPINE: NEGATIVE
AMPHETAMINES QUAL: NEGATIVE
ANION GAP SERPL CALCULATED.3IONS-SCNC: 8 MMOL/L (ref 3–14)
AST SERPL W P-5'-P-CCNC: 18 U/L (ref 0–45)
ATROPINE: NEGATIVE
BACTERIA SPEC CULT: NORMAL
BASE EXCESS BLDA CALC-SCNC: 3 MMOL/L
BASOPHILS # BLD AUTO: 0 10E9/L (ref 0–0.2)
BASOPHILS NFR BLD AUTO: 0.4 %
BENZODIAZ UR QL: NEGATIVE
BILIRUB SERPL-MCNC: 0.4 MG/DL (ref 0.2–1.3)
BUN SERPL-MCNC: 13 MG/DL (ref 7–30)
CAFFEINE QUAL: NEGATIVE
CALCIUM SERPL-MCNC: 8.6 MG/DL (ref 8.5–10.1)
CANNABINOIDS UR QL SCN: NEGATIVE
CARBAMAZEPINE QUAL: POSITIVE
CHLORIDE SERPL-SCNC: 107 MMOL/L (ref 94–109)
CHLORPHENIRAMINE: NEGATIVE
CHLORPROMAZINE: NEGATIVE
CITALOPRAM QUAL: NEGATIVE
CLOMIPRAMINE QUAL: NEGATIVE
CO2 SERPL-SCNC: 26 MMOL/L (ref 20–32)
COCAINE QUAL: NEGATIVE
COCAINE UR QL: NEGATIVE
CODEINE QUAL: NEGATIVE
CREAT SERPL-MCNC: 0.96 MG/DL (ref 0.52–1.04)
DESIPRAMINE QUAL: NEGATIVE
DEXTROMETHORPHAN: NEGATIVE
DIFFERENTIAL METHOD BLD: ABNORMAL
DIPHENHYDRAMINE: NEGATIVE
DOXEPIN/METABOLITE: NEGATIVE
DOXYLAMINE: NEGATIVE
EOSINOPHIL # BLD AUTO: 0.3 10E9/L (ref 0–0.7)
EOSINOPHIL NFR BLD AUTO: 2.6 %
EPHEDRINE OR PSEUDO: NEGATIVE
ERYTHROCYTE [DISTWIDTH] IN BLOOD BY AUTOMATED COUNT: 14.3 % (ref 10–15)
FENTANYL QUAL: NEGATIVE
FLUOXETINE AND METAB: NEGATIVE
GFR SERPL CREATININE-BSD FRML MDRD: 57 ML/MIN/1.7M2
GLUCOSE BLDC GLUCOMTR-MCNC: 167 MG/DL (ref 70–99)
GLUCOSE BLDC GLUCOMTR-MCNC: 171 MG/DL (ref 70–99)
GLUCOSE BLDC GLUCOMTR-MCNC: 174 MG/DL (ref 70–99)
GLUCOSE BLDC GLUCOMTR-MCNC: 199 MG/DL (ref 70–99)
GLUCOSE SERPL-MCNC: 202 MG/DL (ref 70–99)
HCO3 BLD-SCNC: 26 MMOL/L (ref 21–28)
HCT VFR BLD AUTO: 35.7 % (ref 35–47)
HGB BLD-MCNC: 11.6 G/DL (ref 11.7–15.7)
HYDROCODONE QUAL: NEGATIVE
HYDROMORPHONE QUAL: NEGATIVE
IBUPROFEN QUAL: NEGATIVE
IMIPRAMINE QUAL: NEGATIVE
IMM GRANULOCYTES # BLD: 0.1 10E9/L (ref 0–0.4)
IMM GRANULOCYTES NFR BLD: 0.6 %
LAMOTRIGINE QUAL: NEGATIVE
LOXAPINE: NEGATIVE
LYMPHOCYTES # BLD AUTO: 1.8 10E9/L (ref 0.8–5.3)
LYMPHOCYTES NFR BLD AUTO: 16.4 %
MAPROTYLINE: NEGATIVE
MCH RBC QN AUTO: 29.9 PG (ref 26.5–33)
MCHC RBC AUTO-ENTMCNC: 32.5 G/DL (ref 31.5–36.5)
MCV RBC AUTO: 92 FL (ref 78–100)
MDMA QUAL: NEGATIVE
MEPERIDINE QUAL: NEGATIVE
METHAMPHETAMINE: NEGATIVE
METHODONE QUAL: NEGATIVE
MONOCYTES # BLD AUTO: 1.2 10E9/L (ref 0–1.3)
MONOCYTES NFR BLD AUTO: 10.4 %
MORPHINE QUAL: NEGATIVE
NEUTROPHILS # BLD AUTO: 7.7 10E9/L (ref 1.6–8.3)
NEUTROPHILS NFR BLD AUTO: 69.6 %
NICOTINE: NEGATIVE
NORTRIPTYLINE QUAL: NEGATIVE
NRBC # BLD AUTO: 0 10*3/UL
NRBC BLD AUTO-RTO: 0 /100
OLANZAPINE QUAL: NEGATIVE
OPIATES UR QL SCN: NEGATIVE
OXYCODONE QUAL: NEGATIVE
OXYHGB MFR BLD: 99 % (ref 92–100)
PCO2 BLD: 33 MM HG (ref 35–45)
PENTAZOCINE: NEGATIVE
PH BLD: 7.5 PH (ref 7.35–7.45)
PHENCYCLIDINE QUAL: NEGATIVE
PHENMETRAZINE: NEGATIVE
PHENTERMINE: NEGATIVE
PHENYLBUTAZONE: NEGATIVE
PHENYLPROPANOLAMINE: NEGATIVE
PLATELET # BLD AUTO: 140 10E9/L (ref 150–450)
PO2 BLD: 125 MM HG (ref 80–105)
POTASSIUM SERPL-SCNC: 3.3 MMOL/L (ref 3.4–5.3)
POTASSIUM SERPL-SCNC: 4.1 MMOL/L (ref 3.4–5.3)
PROCALCITONIN SERPL-MCNC: 0.06 NG/ML
PROPOXPHENE QUAL: NEGATIVE
PROPRANOLOL QUAL: NEGATIVE
PROT SERPL-MCNC: 7.1 G/DL (ref 6.8–8.8)
PYRILAMINE: NEGATIVE
RBC # BLD AUTO: 3.88 10E12/L (ref 3.8–5.2)
SALICYLATE QUAL: NEGATIVE
SODIUM SERPL-SCNC: 141 MMOL/L (ref 133–144)
SPECIMEN SOURCE: NORMAL
TACROLIMUS BLD-MCNC: 5.6 UG/L (ref 5–15)
THEOBROMINE: NEGATIVE
TME LAST DOSE: NORMAL H
TOPIRAMATE QUAL: NEGATIVE
TRIMIPRAMINE QUAL: NEGATIVE
TSH SERPL DL<=0.005 MIU/L-ACNC: 1.31 MU/L (ref 0.4–4)
VENLAFAXINE QUAL: NEGATIVE
WBC # BLD AUTO: 11 10E9/L (ref 4–11)

## 2018-01-19 PROCEDURE — 94003 VENT MGMT INPAT SUBQ DAY: CPT

## 2018-01-19 PROCEDURE — 70553 MRI BRAIN STEM W/O & W/DYE: CPT

## 2018-01-19 PROCEDURE — 80171 DRUG SCREEN QUANT GABAPENTIN: CPT | Performed by: INTERNAL MEDICINE

## 2018-01-19 PROCEDURE — 25000128 H RX IP 250 OP 636: Performed by: INTERNAL MEDICINE

## 2018-01-19 PROCEDURE — 82805 BLOOD GASES W/O2 SATURATION: CPT | Performed by: INTERNAL MEDICINE

## 2018-01-19 PROCEDURE — 84145 PROCALCITONIN (PCT): CPT | Performed by: INTERNAL MEDICINE

## 2018-01-19 PROCEDURE — 80053 COMPREHEN METABOLIC PANEL: CPT | Performed by: INTERNAL MEDICINE

## 2018-01-19 PROCEDURE — 85025 COMPLETE CBC W/AUTO DIFF WBC: CPT | Performed by: INTERNAL MEDICINE

## 2018-01-19 PROCEDURE — 99291 CRITICAL CARE FIRST HOUR: CPT | Performed by: INTERNAL MEDICINE

## 2018-01-19 PROCEDURE — 84443 ASSAY THYROID STIM HORMONE: CPT | Performed by: INTERNAL MEDICINE

## 2018-01-19 PROCEDURE — 70544 MR ANGIOGRAPHY HEAD W/O DYE: CPT

## 2018-01-19 PROCEDURE — A9585 GADOBUTROL INJECTION: HCPCS | Performed by: INTERNAL MEDICINE

## 2018-01-19 PROCEDURE — 40000281 ZZH STATISTIC TRANSPORT TIME EA 15 MIN

## 2018-01-19 PROCEDURE — 20000003 ZZH R&B ICU

## 2018-01-19 PROCEDURE — 95951 ZZHC EEG VIDEO EACH 24 HR: CPT

## 2018-01-19 PROCEDURE — 36600 WITHDRAWAL OF ARTERIAL BLOOD: CPT

## 2018-01-19 PROCEDURE — 84132 ASSAY OF SERUM POTASSIUM: CPT | Performed by: INTERNAL MEDICINE

## 2018-01-19 PROCEDURE — 25000132 ZZH RX MED GY IP 250 OP 250 PS 637: Mod: GY | Performed by: INTERNAL MEDICINE

## 2018-01-19 PROCEDURE — 40000275 ZZH STATISTIC RCP TIME EA 10 MIN

## 2018-01-19 PROCEDURE — 27210995 ZZH RX 272: Performed by: INTERNAL MEDICINE

## 2018-01-19 PROCEDURE — 00000146 ZZHCL STATISTIC GLUCOSE BY METER IP

## 2018-01-19 PROCEDURE — 25000128 H RX IP 250 OP 636: Performed by: PSYCHIATRY & NEUROLOGY

## 2018-01-19 PROCEDURE — 80183 DRUG SCRN QUANT OXCARBAZEPIN: CPT | Performed by: INTERNAL MEDICINE

## 2018-01-19 PROCEDURE — 36415 COLL VENOUS BLD VENIPUNCTURE: CPT | Performed by: INTERNAL MEDICINE

## 2018-01-19 PROCEDURE — 25000131 ZZH RX MED GY IP 250 OP 636 PS 637: Mod: GY | Performed by: INTERNAL MEDICINE

## 2018-01-19 PROCEDURE — 40000061 ZZH STATISTIC EEG TIME EA 10 MIN

## 2018-01-19 PROCEDURE — A9270 NON-COVERED ITEM OR SERVICE: HCPCS | Mod: GY | Performed by: INTERNAL MEDICINE

## 2018-01-19 PROCEDURE — 40000008 ZZH STATISTIC AIRWAY CARE

## 2018-01-19 PROCEDURE — 25000125 ZZHC RX 250: Performed by: INTERNAL MEDICINE

## 2018-01-19 PROCEDURE — 99291 CRITICAL CARE FIRST HOUR: CPT | Performed by: PSYCHIATRY & NEUROLOGY

## 2018-01-19 PROCEDURE — 70549 MR ANGIOGRAPH NECK W/O&W/DYE: CPT

## 2018-01-19 PROCEDURE — 80369 SKELETAL MUSCLE RELAXANT 1/2: CPT | Performed by: INTERNAL MEDICINE

## 2018-01-19 RX ORDER — ACYCLOVIR 200 MG/1
60 CAPSULE ORAL ONCE
Status: COMPLETED | OUTPATIENT
Start: 2018-01-19 | End: 2018-01-19

## 2018-01-19 RX ORDER — LEVETIRACETAM 10 MG/ML
1000 INJECTION INTRAVASCULAR EVERY 12 HOURS
Status: DISCONTINUED | OUTPATIENT
Start: 2018-01-19 | End: 2018-01-22

## 2018-01-19 RX ORDER — GADOBUTROL 604.72 MG/ML
10 INJECTION INTRAVENOUS ONCE
Status: COMPLETED | OUTPATIENT
Start: 2018-01-19 | End: 2018-01-19

## 2018-01-19 RX ADMIN — HEPARIN SODIUM 5000 UNITS: 5000 INJECTION, SOLUTION INTRAVENOUS; SUBCUTANEOUS at 23:15

## 2018-01-19 RX ADMIN — INSULIN ASPART 1 UNITS: 100 INJECTION, SOLUTION INTRAVENOUS; SUBCUTANEOUS at 03:32

## 2018-01-19 RX ADMIN — CHLORHEXIDINE GLUCONATE 15 ML: 1.2 RINSE ORAL at 23:15

## 2018-01-19 RX ADMIN — MEROPENEM AND SODIUM CHLORIDE 1 G: 1 INJECTION, SOLUTION INTRAVENOUS at 23:15

## 2018-01-19 RX ADMIN — MEROPENEM AND SODIUM CHLORIDE 1 G: 1 INJECTION, SOLUTION INTRAVENOUS at 16:26

## 2018-01-19 RX ADMIN — CLONIDINE HYDROCHLORIDE 0.1 MG: 0.1 TABLET ORAL at 23:14

## 2018-01-19 RX ADMIN — GADOBUTROL 10 ML: 604.72 INJECTION INTRAVENOUS at 09:49

## 2018-01-19 RX ADMIN — MIDAZOLAM 7 MG: 1 INJECTION INTRAMUSCULAR; INTRAVENOUS at 14:24

## 2018-01-19 RX ADMIN — HEPARIN SODIUM 5000 UNITS: 5000 INJECTION, SOLUTION INTRAVENOUS; SUBCUTANEOUS at 05:37

## 2018-01-19 RX ADMIN — MEROPENEM AND SODIUM CHLORIDE 1 G: 1 INJECTION, SOLUTION INTRAVENOUS at 10:23

## 2018-01-19 RX ADMIN — PREDNISONE 5 MG: 5 TABLET ORAL at 10:23

## 2018-01-19 RX ADMIN — CHLORHEXIDINE GLUCONATE 15 ML: 1.2 RINSE ORAL at 10:24

## 2018-01-19 RX ADMIN — TACROLIMUS 2.5 MG: 0.5 CAPSULE ORAL at 23:14

## 2018-01-19 RX ADMIN — TACROLIMUS 2.5 MG: 0.5 CAPSULE ORAL at 10:22

## 2018-01-19 RX ADMIN — ASPIRIN 81 MG 81 MG: 81 TABLET ORAL at 10:22

## 2018-01-19 RX ADMIN — HEPARIN SODIUM 5000 UNITS: 5000 INJECTION, SOLUTION INTRAVENOUS; SUBCUTANEOUS at 14:27

## 2018-01-19 RX ADMIN — INSULIN ASPART 1 UNITS: 100 INJECTION, SOLUTION INTRAVENOUS; SUBCUTANEOUS at 18:07

## 2018-01-19 RX ADMIN — INSULIN ASPART 1 UNITS: 100 INJECTION, SOLUTION INTRAVENOUS; SUBCUTANEOUS at 14:37

## 2018-01-19 RX ADMIN — LEVETIRACETAM 1000 MG: 10 INJECTION INTRAVENOUS at 16:07

## 2018-01-19 RX ADMIN — SODIUM CHLORIDE 60 ML: 9 INJECTION, SOLUTION INTRAMUSCULAR; INTRAVENOUS; SUBCUTANEOUS at 09:49

## 2018-01-19 RX ADMIN — POTASSIUM CHLORIDE 40 MEQ: 1.5 POWDER, FOR SOLUTION ORAL at 05:38

## 2018-01-19 RX ADMIN — LEVOTHYROXINE SODIUM 50 MCG: 50 TABLET ORAL at 10:22

## 2018-01-19 RX ADMIN — SODIUM CHLORIDE: 9 INJECTION, SOLUTION INTRAVENOUS at 01:08

## 2018-01-19 RX ADMIN — MEROPENEM AND SODIUM CHLORIDE 1 G: 1 INJECTION, SOLUTION INTRAVENOUS at 00:29

## 2018-01-19 RX ADMIN — INSULIN ASPART 1 UNITS: 100 INJECTION, SOLUTION INTRAVENOUS; SUBCUTANEOUS at 10:48

## 2018-01-19 RX ADMIN — PANTOPRAZOLE SODIUM 40 MG: 40 INJECTION, POWDER, FOR SOLUTION INTRAVENOUS at 10:21

## 2018-01-19 NOTE — PROVIDER NOTIFICATION
Neuro critical care notified of repetitive fluttering eyelids, motionless eyes, and occasional muscle twitching.  EEG tomorrow, MRI in am, and labs to be checks. Okay for propofol of seizure activity suspected.

## 2018-01-19 NOTE — PLAN OF CARE
Problem: Patient Care Overview  Goal: Plan of Care/Patient Progress Review  Outcome: No Change  Pt had two episodes of repetitive fluttering eyelid movements this evening- no eye movement or limb movement noted with this. Neuro notified. Currently withdraws to pain, squeezes eyes shut during pupil exams, and moves head to voice. Abdominal CT obtained. Potassium replaced. Excoriation noted to coccyx.  updated with plan of care. MRI and EEG ordered for today.

## 2018-01-19 NOTE — PROGRESS NOTES
LTV 1 started at 11am  Ordered: Tamir  READING: KARLA  EEG HOOKED UP BEDSIDE, PAGED  AT 12P OF SUSPICIOUS ACTIVITY

## 2018-01-19 NOTE — PROGRESS NOTES
"Critical Care  Note      01/19/2018    Name: Gem Jade MRN#: [id number]   Age: 72 year old YOB: 1945     Hsptl Day# 1  ICU DAY #1    MV DAY #1             Problem List:   Active Problems:    Vancomycin resistant Enterococcus    Altered mental state    UTI (urinary tract infection)    Sepsis (H)    Encephalopathy acute           Summary/Hospital Course:   Ms. Gem Jade is a 72 yof with a history of neurogenic bladder with chronic indwelling rico, recurrent UTIs, and kidney transplant (1999) maintained on tacrolimus and 5 mg prednisone who was recently admitted to the ICU and [facility] 1/11 through 1/15in the setting of possible status epilepticus (NCSE) requiring intubation.     Prior hospital course - \"Briefly, she was admitted to the hospital on  with altered mental status which was thought to be 2/2 hypoglycemia with glucose 28.  Her rico catheter was exchanged in the ED given dirty UA and long history of recurrent UTIs.  She was tested for influenza and found to be negative.  CT head was obtained and negative for acute pathology.  She was admitted to the floor and underwent ID work up and consult and well as neuro consult for persistently altered mental status.  Infection was felt to be unlikely due to normal WBC, normal procal and absence of fevers. Altered mental status was persistent, EEG was obtained and she was found to be status epilepticus necessitating transfer to the ICU for further cares.  On arrival to the ICU her eyes were open but she was not tracking.  She withdrew to nailbed pressure but was otherwise unresponsive.  The decision was made to intubate for airway protection.\" She was treated for infection with meropenem and then ceftriaxone with improvement in MS and extubation and ultimately discharged 1/15.     Patient who lives at home was unresponsive by her  but delay in EMS call (per ED notes) Upon EMS arrival, the patient was responsive only to painful " stimuli, but was breathing on her own. En route, the patient began having apneic episodes with an oxygen saturation lowest at 85%, so she was bagged. EMS tried 0.4 mg Narcan without change in mental status, and established an IV in her wrist.      She was intubated on arrival to ICU for GCS 9 but inability to protect airway. FAST exam negative, bedside ECHO with no overt abnormality. ABG was without hypercarbia UA was dirty thus presumption was encephalopathy 2/2 infection. She was bolused fluid, meropenem as well propofol and admitted to ICU    In ICU off propofol she is moving extremities spontaneously however not responsive to verbal or painful stimuli.           Assessment and plan :   Neurology/Psychiatry:   1. Acute encephalopathy - repeat presentation, seems metabolic in nature, was hypoglycemic on prior admission, had UTI recently treated - no outward signs of sepsis but immunocompromised host,  Possible seizure though last EEG negative, possible polypharmacy with sedative meds,    2. Analgesia/Anxiety  Plan  -Hold home baclofen gabapentin Trileptal and tramadol  - hold off sedative medications - goal RASS 0   - EEG and MRI per  Neuro crit consult -     Cardiovascular:   1.Hemodynamics -appropriate, HTN  - noted descrepancy between upper and lower extremity BP  2.Rhythm -sinus  3. Ischemia -no evidence  Plan   -Continue prior to admission aspirin clonidine and labetalol pravastatin  - Consider  as needed's if systolic greater than 180 (lower extremities)     Pulmonary/Ventilator Management:   1.  Acute hypoxic respiratory failure secondary to encephalopathy - stable on vent  - mental status precludes extubation  Plan  - continue MV  - serial ABG as indicated      GI and Nutrition :   1. No active issues   2. NPO  Plan  - start enteral feeds  - aspiration precautions        Renal/Fluids/Electrolytes:   1. CKD - Stage IV CKD >  ESRD , Hx of renal transplant   2. Neurogenic bladder with chronic rico (exchanged  1/11 from last admission)  3. Electrolyte abnormality -   3. Acid/base status  4. Volume status  Plan  - continue tacrolimus and prednisone  - tacro levels per renal/pharmacy   - renal following   - monitor function and electrolytes as needed with replacement per ICU protocols.  - generally avoid nephrotoxic agents such as NSAID, IV contrast unless specifically required  - adjust medications as needed for renal clearance  - follow I/O's as appropriate.     Infectious Disease:   1.  Chronic recurrent urinary tract infections with chronic indwelling Johnson, in an immunocompromised host ;s/p recent course abx 1/11 - 1/15 , dosed meropenem in ED, UA still dirty -  CT ABD/pelvis neg for  for pyelonephritis /hydronephrosis  - priro CT with hydronephrosis in transplanted kidney with suggestion of stone  2. Hx of VRE and ESBL  Plan  - FUP cultures  - check procal  - continue empiric meropenem for 48hr , narrow as able  -      Endocrine:   1. Hyperglycemia - known DM -   2. Hypothyroidism  3. Hyperparathyroidism- calcium stable  Plan  - ICU insulin protocol, goal sugar <180  - hold off long acting insulin- continue with SS for now   - continue home levothyroxine      Hematology/Oncology:   1. Chronic Anemia, no signs, symptoms of active blood loss  2. Hx of PE  Plan  - serial CBC coags  - DVT prophylaxis        MSKL/Rheum:  1. No active issues   Plan  - wound care per unit routine         IV/Access:   1. Venous access - PIV   2. Arterial access -  none  Plan  - PIV        ICU Prophylaxis:   1. DVT: Hep subq   2. VAP: HOB 30 degrees, chlorhexidine rinse  3. Stress Ulcer: PPI  4. Restraints: Nonviolent soft two point restraints required and necessary for patient safety and continued cares and good effect as patient continues to pull at necessary lines, tubes despite education and distraction. Will readdress daily.   5. Wound care - per unit routine   6. Feeding - NPO  7. Family Update: Over phone   8. Disposition - ICU              lnterval History:     - no major events overnight  - off sedation  - neuro CC consulted - EEG this AM  - CT abd negative for hydro/pyelo noted cytitis   - remains on abx                Key Medications:       chlorhexidine  15 mL Mouth/Throat Q12H     insulin aspart  1-4 Units Subcutaneous Q4H     pantoprazole (PROTONIX) IV PEDS/NICU  40 mg Intravenous Daily     heparin  5,000 Units Subcutaneous Q8H     aspirin  81 mg Oral or Feeding Tube Daily     cloNIDine  0.1 mg Oral or Feeding Tube BID     [START ON 2/8/2018] cyanocobalamin  1,000 mcg Intramuscular Q30 Days     levothyroxine  50 mcg Oral or Feeding Tube QAM AC     predniSONE  5 mg Oral or Feeding Tube Daily     tacrolimus  2.5 mg Oral BID IS     meropenem  1 g Intravenous Q8H       propofol (DIPRIVAN) infusion       NaCl 100 mL/hr at 01/19/18 0108               Physical Examination:   Temp:  [94.8  F (34.9  C)-98.6  F (37  C)] 97.9  F (36.6  C)  Pulse:  [80] 80  Heart Rate:  [67-84] 84  Resp:  [7-80] 10  BP: ()/() 130/56  FiO2 (%):  [40 %-50 %] 40 %  SpO2:  [93 %-100 %] 100 %      Intake/Output Summary (Last 24 hours) at 01/19/18 1218  Last data filed at 01/19/18 1000   Gross per 24 hour   Intake             1530 ml   Output             2325 ml   Net             -795 ml     No intake or output data in the 24 hours ending 01/18/18 1648  Wt Readings from Last 4 Encounters:   01/18/18 101.2 kg (223 lb 1.7 oz)   01/14/18 102.9 kg (226 lb 14.4 oz)   12/23/17 108 kg (238 lb)   11/17/17 101.7 kg (224 lb 1.6 oz)     BP - Mean:  [] 93  Ventilation Mode: CMV/AC  FiO2 (%): 40 %  Rate Set (breaths/minute): 12 breaths/min  Tidal Volume Set (mL): 550 mL  PEEP (cm H2O): 5 cmH2O  Pressure Support (cm H2O): 50 cmH2O  Oxygen Concentration (%): 40 %  Resp: 10    Recent Labs  Lab 01/19/18  0543 01/18/18  1545 01/13/18  1543 01/13/18  0430 01/13/18  0023   PH 7.50* 7.43 7.36 7.41 7.50*   PCO2 33* 39 41 39 31*   PO2 125* 169* 151* 135* 323*   HCO3 26 26 23 25 24    O2PER  --  Ventilator 40% 40 80       GEN:  Intubated unresponsive  acute distress   HEENT: head ncat, sclera anicteric, OP patent, trachea midline   PULM: unlabored synchronous with vent, clear anteriorly    CV/COR: RRR S1S2 no gallop,  No rub, no murmur of not Upper ext BP 50 point higher than lower extremity BP - each symmetric L and R   ABD: soft nontender, hypoactive bowel sounds, no mass  EXT: no edema,  warm  NEURO:  Unresponsive to verbal and painful stimuli, moving all 4 extremities spontaneously   SKIN: no obvious rash  LINES: clean, dry intact         Data:   All data and imaging reviewed     ROUTINE ICU LABS (Last four results)  CMP    Recent Labs  Lab 01/19/18  0445 01/18/18  1532 01/18/18  1420 01/15/18  0755 01/14/18  0353    138 Canceled, Test credited 136 139   POTASSIUM 3.3* 3.5 Canceled, Test credited 3.5 3.6   CHLORIDE 107 103 Canceled, Test credited 104 107   CO2 26 26 Canceled, Test credited 21 23   ANIONGAP 8 9 Canceled, Test credited 11 9   * 264* Canceled, Test credited 96 63*   BUN 13 13 Canceled, Test credited 12 12   CR 0.96 0.99 Canceled, Test credited 0.82 0.88   GFRESTIMATED 57* 55* Canceled, Test credited 69 63   GFRESTBLACK 69 67 Canceled, Test credited 84 77   ROGER 8.6 8.7 Canceled, Test credited 8.4* 7.6*   PROTTOTAL 7.1 7.2 Canceled, Test credited  --  6.1*   ALBUMIN 2.6* 2.8* Canceled, Test credited  --  2.3*   BILITOTAL 0.4 0.7 Canceled, Test credited  --  0.4   ALKPHOS 92 86 Canceled, Test credited  --  67   AST 18 13 Canceled, Test credited  --  14   ALT 15 13 Canceled, Test credited  --  11     CBC    Recent Labs  Lab 01/19/18  0445 01/18/18  1532 01/18/18  1420 01/15/18  0755   WBC 11.0 4.7 Canceled, Test credited, specimen discarded 5.3   RBC 3.88 3.54* Canceled, Test credited, specimen discarded 3.61*   HGB 11.6* 10.9* Canceled, Test credited, specimen discarded 10.8*   HCT 35.7 33.1* Canceled, Test credited, specimen discarded 33.9*   MCV 92 94 Canceled,  Test credited, specimen discarded 94   MCH 29.9 30.8 Canceled, Test credited, specimen discarded 29.9   MCHC 32.5 32.9 Canceled, Test credited, specimen discarded 31.9   RDW 14.3 14.1 Canceled, Test credited, specimen discarded 14.3   * 134* Canceled, Test credited, specimen discarded 134*     INRNo lab results found in last 7 days.  Arterial Blood Gas    Recent Labs  Lab 01/19/18  0543 01/18/18  1545 01/13/18  1543 01/13/18  0430 01/13/18  0023   PH 7.50* 7.43 7.36 7.41 7.50*   PCO2 33* 39 41 39 31*   PO2 125* 169* 151* 135* 323*   HCO3 26 26 23 25 24   O2PER  --  Ventilator 40% 40 80       All cultures:    Recent Labs  Lab 01/18/18  1532 01/18/18  1445 01/18/18  1420 01/18/18  0754   CULT No growth after 14 hours Culture in progress No growth after 14 hours Canceled, Test creditedDuplicate request2 SETS OF BCS IN PROCESS     Recent Results (from the past 24 hour(s))   XR Chest Port 1 View    Narrative    CHEST PORTABLE ONE VIEW   1/18/2018 2:40 PM     HISTORY: Intubation.     COMPARISON: 1/12/2018.      Impression    IMPRESSION: New ET tube extends into the right mainstem bronchus.  Recommend retraction. The lungs remain aerated without evidence of  significant collapse. Cardiac silhouette borderline enlarged.    Results discussed with Armen Sauceda at 3:05 PM on 1/18/2018.    ANABELA GONGORA MD   Head CT w/o contrast    Narrative    CT SCAN OF THE HEAD WITHOUT CONTRAST   1/18/2018 3:06 PM     HISTORY: Loss of consciousness.    TECHNIQUE:  Axial images of the head and coronal reformations without  IV contrast material. Radiation dose for this scan was reduced using  automated exposure control, adjustment of the mA and/or kV according  to patient size, or iterative reconstruction technique.    COMPARISON: None.    FINDINGS:  There is generalized atrophy of the brain.  White matter  changes are present in the cerebral hemispheres that are consistent  with small vessel ischemic disease in this age patient. There  is no  evidence of intracranial hemorrhage, mass, acute infarct or anomaly.  Mucosal thickening is present in the maxillary sinuses. Multiple  ethmoid sinuses are opacified. Mucosal thickening in the sphenoid  sinuses There is no evidence of trauma.      Impression    IMPRESSION: No acute pathology. No bleed, mass, or infarcts are seen.      NAYANA GONSALES MD   Chest  XR, 1 view PORTABLE    Narrative    PORTABLE CHEST ONE VIEW   1/18/2018  4:40 PM     HISTORY: Reassess endotracheal tube and nasogastric tube location.    COMPARISON: Earlier the same day.      Impression    IMPRESSION: ET tube has been retracted and now projects over the mid  trachea. New enteric tube projects over the left upper quadrant.    No focal airspace opacity. No pneumothorax visualized. Cardiac  silhouette remains borderline-enlarged.    ANABELA GONGORA MD         Billing: This patient is critically ill: Yes. Total critical care time today 35 min.

## 2018-01-19 NOTE — CONSULTS
VASCULAR NEUROLOGY    HPI:  Gem Jade is a 72 year old female year old with renal transplant, DM with gastroparesis, polyneuropathy, retinopathy, and chronic UTI will indwelling catheter who presents with decreased responsiveness requiring intubation.  She had a similar admission last week with negative MRI and EEG showing severe encephalopathy without seizures.  She was treated with antibiotics and supportive care with gradual improvement, without clear elucidation of the underlying pathology (possible UTI, possible preadmission hypoglycemia).  On this admission, she was intubated in the ED and an ABG post-intubation did not show CO2 retention (she was bagged on the way to the hospital by EMS).  Of note, she has significant difference in BP between her UE and LEs.  She also takes multiple medications that may be sedating including: gabapentin, trileptal, tramadol and baclofen (baclofen can produce sedation in somnolence with excess ingestion or withdrawal).    Evaluation: toxicology screen pending, UA dirty (but has chronic indwelling rico), CBC, BMP unremarkable.  Vital signs stable with large discrepancy in BP obtained in UEs vs. LEs    Impression:  1. Encephalopathy, possibly toxic/metabolic/UTI/other  2. Fluttering eye movements and altered mental status--possible NCSE      Recommendations:  1. Holding sedating medications  2. Added Trileptal, Gabapentin, and Baclofen, tacrolimus level to labs  --Urine culture pending  --tox screen pending  3. Routine continuous EEG  4. MRI Brain with MRA Head/Neck  5. Continue supportive care per ICU    Please contact the Stroke Service with any questions.  Thank you.      Roel Garnett MD, MS  Neurology  January 18, 2018    Pager: [phone]      I spent 45 minutes of critical care time. I personally reviewed all relevant labs and neuroimaging.    ____________________________________________________________________      Past Medical History    ______________________________________________  Past Medical History:   Diagnosis Date     Background diabetic retinopathy(362.01)     extensive diabetic retinopathy, s/p multiple laser treatments     Diabetic gastroparesis (H)     followed by [facility] (Dr. 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)      Urinary tract infection, site not specified     Chronic UTIs     UTI (urinary tract infection) due to urinary indwelling catheter (H)      Past Surgical History   ______________________________________________  Past Surgical History:   Procedure Laterality Date     C NONSPECIFIC PROCEDURE  10/99    kidney transplant     C NONSPECIFIC PROCEDURE  8/00    MRI head, lumbar spine, pelvis     C TOTAL KNEE ARTHROPLASTY  3/03    Knee Replacement, Total right, post op PE     CHOLECYSTECTOMY       HC LEFT HEART CATHETERIZATION  1999    Cardiac Cath, Left Heart, Negative  (@ [facility])     HC LEFT HEART CATHETERIZATION  4/05    normal coronary angiogram at [facility], had mild troponin rise (0.71)     HC REMV CATARACT EXTRACAP,INSERT LENS  8/04    bilateral cataract repaired, left eye      Prior to Admission Medications   ______________________________________________    Prior to Admission Medications   Prescriptions Last Dose Informant Patient Reported? Taking?   ACETAMINOPHEN PO unk Other Yes Yes   Sig: Take 325-650 mg by mouth every 4 hours as needed.   Cranberry 400 MG TABS unk Other Yes Yes   Sig: Take 400 mg by mouth 2 times daily   Lactobacillus Rhamnosus, GG,  (CULTURELL) capsule unk Other No Yes   Sig: Take 1 capsule by mouth 2 times daily   ORDER FOR DME  Other No No   Sig: Equipment being ordered: Johnson Catheters - 18 french   OXcarbazepine (TRILEPTAL) 300 MG tablet unk Other Yes Yes   Sig: Take 1 tablet (300 mg) by mouth 2 times daily For pain   TACROLIMUS PO 1/18/2018 at 0000 Pharmacy Yes Yes   Sig: Take 2.5 mg by mouth 2 times daily   TraMADol HCl 50 MG TBDP 1/18/2018 at Unknown time Other No Yes   Sig: Take 50 mg by mouth 3 times daily as needed   VITAMIN D, CHOLECALCIFEROL, PO unk Other Yes Yes   Sig: Take 4,000 Units by mouth 2 times daily   aspirin 81 MG chewable tablet unk Other No Yes   Sig: Take 1 tablet (81 mg) by mouth daily Starting 1 week after your kidney biopsy   baclofen (LIORESAL) 10 MG tablet 1/18/2018 at Unknown time Other Yes Yes   Sig: Take 1 tablet (10 mg) by mouth 3 times daily as needed for muscle spasms   blood glucose monitoring (ACCU-CHEK COMPACT PLUS) test strip  Other Yes No   Sig: Use to test blood sugar 3 times daily or as directed.  Ok to substitute alternative if insurance prefers.   cephALEXin (KEFLEX) 500 MG capsule 1/18/2018 at Unknown time  No Yes   Sig: Take 1 capsule (500 mg) by mouth 4 times daily for 7 days   cloNIDine (CATAPRES) 0.1 MG tablet unk Other No Yes   Sig: TAKE ONE TABLET BY MOUTH TWICE A DAY   cyanocobalamin (VITAMIN B12) 1000 MCG/ML injection 1/11/2018 Other No Yes   Sig: Inject 1 mL (1,000 mcg) into the muscle every 30 days   diphenoxylate-atropine (LOMOTIL) 2.5-0.025 MG per tablet unk Other No Yes   Sig: Take 1 tablet by mouth 4 times daily as needed for diarrhea   famotidine (PEPCID) 20 MG tablet unk Other Yes Yes   Sig: Take 1 tablet (20 mg) by mouth 2 times daily   gabapentin (NEURONTIN) 300 MG capsule unk Other No Yes   Sig: Take 3 capsules (900 mg) by mouth 2 times daily For pain   guaiFENesin (ROBITUSSIN) 100 MG/5ML SYRP unk Other No Yes   Sig: Take 10-20 mLs by mouth every 4 hours as needed for cough    insulin aspart (NOVOLOG FLEXPEN) 100 UNIT/ML injection unk Other No Yes   Sig: Inject 10 units under the skin 2 times daily before meals as directed. Add sliding scale as needed. Avg use 25 units per day   Patient taking differently: Inject 10 units under the skin 2 times daily before meals as directed. Add sliding scale as needed.  2 units for every 50 over 201.  Avg use 25 units per day   insulin glargine (LANTUS SOLOSTAR) 100 UNIT/ML injection unk Other No Yes   Sig: Inject 24 units under the skin daily.   Patient taking differently: Inject 24 Units Subcutaneous At Bedtime Inject 24 units under the skin daily.   insulin pen needle 30G X 8 MM  Other No No   Sig: Use as directed with Lantus   labetalol (NORMODYNE) 300 MG tablet unk Other Yes Yes   Sig: Take 150 mg by mouth 2 times daily Take 1/2 tablet (150mg) =300mg. Hold for systolic BP less than 120.   levothyroxine (SYNTHROID/LEVOTHROID) 50 MCG tablet unk Other No Yes   Sig: TAKE ONE TABLET BY MOUTH EVERY DAY   lidocaine (XYLOCAINE) 2 % topical gel unk Other No Yes   Sig: Apply topically as needed for moderate pain 10 ML every 3 weeks   loratadine (CLARITIN) 10 MG tablet unk Other No Yes   Sig: Take 1 tablet (10 mg) by mouth daily   meclizine (ANTIVERT) 12.5 MG tablet unk Other No Yes   Sig: Take 1 tablet (12.5 mg) by mouth 3 times daily as needed for dizziness   ondansetron (ZOFRAN ODT) 4 MG disintegrating tablet unk Other No Yes   Sig: Take 1-2 tablets (4-8 mg) by mouth every 8 hours as needed for nausea   order for DME  Other No No   Sig: Equipment being ordered: Blue Adrián  [facility]   order for DME  Other No No   Sig: Equipment being ordered: Sapheon CPAP interface (nasal pillows), size XS.   order for DME  Other No No   Sig: Equipment being ordered: Silver Impregnated catheters  HX of frequent UTI   order for DME  Other No No   Sig: High back guerrero wheel chair  MICHELLE 99  DX: morbid obesity, generalized weakness, physical  deconditioning, chronic vertigo   pravastatin (PRAVACHOL) 10 MG tablet unk Other No Yes   Sig: TAKE ONE TABLET BY MOUTH EVERY DAY   predniSONE (DELTASONE) 5 MG tablet unk Other No Yes   Sig: Take 1 tablet (5 mg) by mouth daily   simethicone (MYLICON) 125 MG CHEW unk Other Yes Yes   Sig: Take 1 tablet (125 mg) by mouth as needed for intestinal gas      Facility-Administered Medications: None     Allergies   Allergies   Allergen Reactions     Bactrim Ds [Sulfamethoxazole W/Trimethoprim]      Creatinine level increased     Atorvastatin Calcium      myalgias, muscle aches     Codeine Nausea and Vomiting     Darvocet [Propoxyphene N-Apap] Nausea and Vomiting     Hydromorphone      Levofloxacin Other (See Comments) and Diarrhea     hallucinations     Morphine      Nausea and vomiting     Niaspan [Niacin] GI Disturbance     Flushing even at low dose, GI upset     Percocet [Oxycodone-Acetaminophen]      Vomiting after taking med couple of times     Vicodin [Hydrocodone-Acetaminophen] Nausea and Vomiting       Social History   ______________________________________________  Social History     Social History     Marital status:      Spouse name: N/A     Number of children: N/A     Years of education: N/A     Social History Main Topics     Smoking status: Former Smoker     Years: 17.00     Quit date: 6/1/1987     Smokeless tobacco: None     Alcohol use No     Drug use: No     Sexual activity: Yes     Partners: Male     Other Topics Concern     None     Social History Narrative       Family History   ______________________________________________  Family History   Problem Relation Age of Onset     DIABETES Mother      DIABETES Brother      Hypertension Father      [id number] pneumonia     HEART DISEASE Father          ROS: relevant review of systems as per HPI, otherwise reviewed in detail and non-contributory      PHYSICAL EXAM    Vital Signs:  B/P: 101/37, T: 97.5, P: 80, R: 13     Neuro:  Intubated, no sedation,  patient does not open eyes to voice or noxious stimuli.  Does not follow any commands, but squeeze hands spontaneously on R and L.  Actively resists passive eye opening.  PERRL, EOMI, No blink to threat.  Face symmetric, coughing on ET tube.  Flickers/grimmaces ot noxious stimuli in UEs, more pronounced purposeful withdrawal in LEs.  No pathologic reflexes, possibly due to underlying neuropathy.      Labs/Studies:  CBC:     Recent Labs  Lab 01/18/18  1532 01/18/18  1420 01/15/18  0755   WBC 4.7 Canceled, Test credited, specimen discarded 5.3   RBC 3.54* Canceled, Test credited, specimen discarded 3.61*   HGB 10.9* Canceled, Test credited, specimen discarded 10.8*   HCT 33.1* Canceled, Test credited, specimen discarded 33.9*   * Canceled, Test credited, specimen discarded 134*     Basic Metabolic Panel:   Recent Labs   Lab Test  01/18/18   1532  01/18/18   1420  01/15/18   0755   NA  138  Canceled, Test credited  136   POTASSIUM  3.5  Canceled, Test credited  3.5   CHLORIDE  103  Canceled, Test credited  104   CO2  26  Canceled, Test credited  21   BUN  13  Canceled, Test credited  12   CR  0.99  Canceled, Test credited  0.82   GLC  264*  Canceled, Test credited  96   ROGER  8.7  Canceled, Test credited  8.4*     Liver panel:  Recent Labs   Lab Test  01/18/18   1532  01/18/18   1420  01/14/18   0353  01/11/18   1502  12/19/17   1602   PROTTOTAL  7.2  Canceled, Test credited  6.1*  8.2  7.7   ALBUMIN  2.8*  Canceled, Test credited  2.3*  3.1*  3.0*   BILITOTAL  0.7  Canceled, Test credited  0.4  0.3  0.4   ALKPHOS  86  Canceled, Test credited  67  86  83   AST  13  Canceled, Test credited  14  24  16   ALT  13  Canceled, Test credited  11  16  21     INR:  Recent Labs   Lab Test  11/06/17   1652  04/26/17   2338  04/26/17   0638  03/08/17   1243  09/02/16   0838   INR  1.09  1.03  1.06  1.01  1.09      Lipid Profile:  Recent Labs   Lab Test  12/05/16   1210  02/05/15   1250  05/16/13   1240  06/16/11   1230   11/07/10   1250   CHOL  164  106  244*  179  195   HDL  46*  43*  42*  50  57   LDL  64  18  149*  83  93   TRIG  272*  223*  262*  226*  221*   CHOLHDLRATIO   --   2.5  6.0*  3.6  3.4     A1C:   Recent Labs   Lab Test  01/12/18   0648  12/19/17   1602  11/14/17   0455  07/13/17   1105  12/05/16   1210   A1C  6.3*  6.7*  7.7*  5.6  7.4*     Troponin I:   Recent Labs   Lab Test  11/13/17   1613  04/19/17   0005  01/10/16   1408  12/18/15   1315  08/24/15   0030  03/18/14   0043  05/19/13   0625  04/11/11   1455   TROPI  <0.015  <0.015  The 99th percentile for upper reference range is 0.045 ug/L.  Troponin values in   the range of 0.045 - 0.120 ug/L may be associated with risks of adverse   clinical events.    <0.015  The 99th percentile for upper reference range is 0.045 ug/L.  Troponin values in   the range of 0.045 - 0.120 ug/L may be associated with risks of adverse   clinical events.    <0.015  The 99th percentile for upper reference range is 0.045 ug/L.  Troponin values in   the range of 0.045 - 0.120 ug/L may be associated with risks of adverse   clinical events.    <0.015  The 99th percentile for upper reference range is 0.045 ug/L.  Troponin values in   the range of 0.045 - 0.120 ug/L may be associated with risks of adverse   clinical events.    0.012  <0.012  <0.012         Imaging:  Relevant findings as per the HPI/Impression above.

## 2018-01-19 NOTE — PROGRESS NOTES
[facility] ICU RESPIRATORY NOTE  Date of Admission: 1/18/18  Date of Intubation (most recent): 1/18/18  Reason for Mechanical Ventilation: Airway protection   Number of Days on Mechanical Ventilation: 2  Met Criteria for Pressure Support Trial:  Length of Pressure Support Trial:  Reason for Stopping Pressure Support Trial:  Reason for No Pressure Support Trial: Per MD    Significant Events Today: None this shift    ABG Results:     Recent Labs  Lab 01/18/18  1545 01/13/18  1543 01/13/18  0430 01/13/18  0023   PH 7.43 7.36 7.41 7.50*   PCO2 39 41 39 31*   PO2 169* 151* 135* 323*   HCO3 26 23 25 24   O2PER Ventilator 40% 40 80         ETT appearance on chest x-ray: In good position     Plan:  MRI this AM    Dimitry Berrios RT

## 2018-01-19 NOTE — PLAN OF CARE
Problem: Patient Care Overview  Goal: Plan of Care/Patient Progress Review  Neuro: opens eyes to stimulation and voice at times but doesn't follow command  CV: SR, prn meds for BP control orderred   Pulm: CMV Vt 550 PEEP 5 Fio2 50% RR 14. LS coarse, small thick white secretions.   GI/: OG tube in place at 62CM at the lip, BS hypoactive, rico with good UOP   Skin: coccyx preexisting excoriation mepilex reapplied  Lines: PIV x2  Restraints: Continued to maintain patency of necessary lines and ET tube.   Plan: Neurocrit consult placed, ?EEG,

## 2018-01-20 LAB
ALBUMIN SERPL-MCNC: 2.6 G/DL (ref 3.4–5)
ALBUMIN SERPL-MCNC: NORMAL G/DL (ref 3.4–5)
ALP SERPL-CCNC: 106 U/L (ref 40–150)
ALP SERPL-CCNC: NORMAL U/L (ref 40–150)
ALT SERPL W P-5'-P-CCNC: 20 U/L (ref 0–50)
ALT SERPL W P-5'-P-CCNC: NORMAL U/L (ref 0–50)
ANION GAP SERPL CALCULATED.3IONS-SCNC: 10 MMOL/L (ref 3–14)
ANION GAP SERPL CALCULATED.3IONS-SCNC: NORMAL MMOL/L (ref 6–17)
AST SERPL W P-5'-P-CCNC: 22 U/L (ref 0–45)
AST SERPL W P-5'-P-CCNC: NORMAL U/L (ref 0–45)
BASOPHILS # BLD AUTO: 0 10E9/L (ref 0–0.2)
BASOPHILS NFR BLD AUTO: 0.3 %
BILIRUB SERPL-MCNC: 0.4 MG/DL (ref 0.2–1.3)
BILIRUB SERPL-MCNC: NORMAL MG/DL (ref 0.2–1.3)
BUN SERPL-MCNC: 14 MG/DL (ref 7–30)
BUN SERPL-MCNC: NORMAL MG/DL (ref 7–30)
CALCIUM SERPL-MCNC: 8.6 MG/DL (ref 8.5–10.1)
CALCIUM SERPL-MCNC: NORMAL MG/DL (ref 8.5–10.1)
CHLORIDE SERPL-SCNC: 110 MMOL/L (ref 94–109)
CHLORIDE SERPL-SCNC: NORMAL MMOL/L (ref 94–109)
CO2 SERPL-SCNC: 23 MMOL/L (ref 20–32)
CO2 SERPL-SCNC: NORMAL MMOL/L (ref 20–32)
CREAT SERPL-MCNC: 0.9 MG/DL (ref 0.52–1.04)
CREAT SERPL-MCNC: NORMAL MG/DL (ref 0.52–1.04)
DIFFERENTIAL METHOD BLD: ABNORMAL
EOSINOPHIL # BLD AUTO: 0.3 10E9/L (ref 0–0.7)
EOSINOPHIL NFR BLD AUTO: 3.5 %
ERYTHROCYTE [DISTWIDTH] IN BLOOD BY AUTOMATED COUNT: 15.1 % (ref 10–15)
GFR SERPL CREATININE-BSD FRML MDRD: 62 ML/MIN/1.7M2
GFR SERPL CREATININE-BSD FRML MDRD: NORMAL ML/MIN/1.7M2
GLUCOSE BLDC GLUCOMTR-MCNC: 109 MG/DL (ref 70–99)
GLUCOSE BLDC GLUCOMTR-MCNC: 110 MG/DL (ref 70–99)
GLUCOSE BLDC GLUCOMTR-MCNC: 111 MG/DL (ref 70–99)
GLUCOSE BLDC GLUCOMTR-MCNC: 128 MG/DL (ref 70–99)
GLUCOSE BLDC GLUCOMTR-MCNC: 96 MG/DL (ref 70–99)
GLUCOSE SERPL-MCNC: 116 MG/DL (ref 70–99)
GLUCOSE SERPL-MCNC: NORMAL MG/DL (ref 70–99)
HCT VFR BLD AUTO: 34.4 % (ref 35–47)
HGB BLD-MCNC: 11.3 G/DL (ref 11.7–15.7)
IMM GRANULOCYTES # BLD: 0.1 10E9/L (ref 0–0.4)
IMM GRANULOCYTES NFR BLD: 0.7 %
LYMPHOCYTES # BLD AUTO: 1.9 10E9/L (ref 0.8–5.3)
LYMPHOCYTES NFR BLD AUTO: 26.6 %
MCH RBC QN AUTO: 30.1 PG (ref 26.5–33)
MCHC RBC AUTO-ENTMCNC: 32.8 G/DL (ref 31.5–36.5)
MCV RBC AUTO: 92 FL (ref 78–100)
MONOCYTES # BLD AUTO: 0.5 10E9/L (ref 0–1.3)
MONOCYTES NFR BLD AUTO: 7.5 %
NEUTROPHILS # BLD AUTO: 4.4 10E9/L (ref 1.6–8.3)
NEUTROPHILS NFR BLD AUTO: 61.4 %
NRBC # BLD AUTO: 0 10*3/UL
NRBC BLD AUTO-RTO: 0 /100
PLATELET # BLD AUTO: 150 10E9/L (ref 150–450)
POTASSIUM SERPL-SCNC: 3.4 MMOL/L (ref 3.4–5.3)
POTASSIUM SERPL-SCNC: NORMAL MMOL/L (ref 3.4–5.3)
PROT SERPL-MCNC: 7.1 G/DL (ref 6.8–8.8)
PROT SERPL-MCNC: NORMAL G/DL (ref 6.8–8.8)
RBC # BLD AUTO: 3.75 10E12/L (ref 3.8–5.2)
SODIUM SERPL-SCNC: 143 MMOL/L (ref 133–144)
SODIUM SERPL-SCNC: NORMAL MMOL/L (ref 133–144)
WBC # BLD AUTO: 7.1 10E9/L (ref 4–11)

## 2018-01-20 PROCEDURE — 80053 COMPREHEN METABOLIC PANEL: CPT | Performed by: INTERNAL MEDICINE

## 2018-01-20 PROCEDURE — A9270 NON-COVERED ITEM OR SERVICE: HCPCS | Mod: GY | Performed by: INTERNAL MEDICINE

## 2018-01-20 PROCEDURE — 25000132 ZZH RX MED GY IP 250 OP 250 PS 637: Mod: GY | Performed by: INTERNAL MEDICINE

## 2018-01-20 PROCEDURE — 40000275 ZZH STATISTIC RCP TIME EA 10 MIN

## 2018-01-20 PROCEDURE — 40000061 ZZH STATISTIC EEG TIME EA 10 MIN

## 2018-01-20 PROCEDURE — 85025 COMPLETE CBC W/AUTO DIFF WBC: CPT | Performed by: INTERNAL MEDICINE

## 2018-01-20 PROCEDURE — 36415 COLL VENOUS BLD VENIPUNCTURE: CPT | Performed by: INTERNAL MEDICINE

## 2018-01-20 PROCEDURE — 95951 ZZHC EEG VIDEO EACH 24 HR: CPT

## 2018-01-20 PROCEDURE — 94003 VENT MGMT INPAT SUBQ DAY: CPT

## 2018-01-20 PROCEDURE — 99291 CRITICAL CARE FIRST HOUR: CPT | Performed by: PSYCHIATRY & NEUROLOGY

## 2018-01-20 PROCEDURE — 00000146 ZZHCL STATISTIC GLUCOSE BY METER IP

## 2018-01-20 PROCEDURE — 80197 ASSAY OF TACROLIMUS: CPT | Performed by: INTERNAL MEDICINE

## 2018-01-20 PROCEDURE — 25000128 H RX IP 250 OP 636: Performed by: INTERNAL MEDICINE

## 2018-01-20 PROCEDURE — 25000128 H RX IP 250 OP 636: Performed by: PSYCHIATRY & NEUROLOGY

## 2018-01-20 PROCEDURE — 99291 CRITICAL CARE FIRST HOUR: CPT | Performed by: INTERNAL MEDICINE

## 2018-01-20 PROCEDURE — 20000003 ZZH R&B ICU

## 2018-01-20 PROCEDURE — 25000131 ZZH RX MED GY IP 250 OP 636 PS 637: Mod: GY | Performed by: INTERNAL MEDICINE

## 2018-01-20 PROCEDURE — 25000125 ZZHC RX 250: Performed by: INTERNAL MEDICINE

## 2018-01-20 RX ORDER — SODIUM CHLORIDE AND POTASSIUM CHLORIDE 150; 450 MG/100ML; MG/100ML
INJECTION, SOLUTION INTRAVENOUS CONTINUOUS
Status: DISCONTINUED | OUTPATIENT
Start: 2018-01-20 | End: 2018-01-24

## 2018-01-20 RX ADMIN — MEROPENEM AND SODIUM CHLORIDE 1 G: 1 INJECTION, SOLUTION INTRAVENOUS at 07:55

## 2018-01-20 RX ADMIN — POTASSIUM CHLORIDE AND SODIUM CHLORIDE: 450; 150 INJECTION, SOLUTION INTRAVENOUS at 15:19

## 2018-01-20 RX ADMIN — LABETALOL HYDROCHLORIDE 10 MG: 5 INJECTION, SOLUTION INTRAVENOUS at 11:09

## 2018-01-20 RX ADMIN — FENTANYL CITRATE 25 MCG: 50 INJECTION, SOLUTION INTRAMUSCULAR; INTRAVENOUS at 08:09

## 2018-01-20 RX ADMIN — HEPARIN SODIUM 5000 UNITS: 5000 INJECTION, SOLUTION INTRAVENOUS; SUBCUTANEOUS at 15:16

## 2018-01-20 RX ADMIN — ASPIRIN 81 MG 81 MG: 81 TABLET ORAL at 08:01

## 2018-01-20 RX ADMIN — FENTANYL CITRATE 50 MCG: 50 INJECTION, SOLUTION INTRAMUSCULAR; INTRAVENOUS at 11:06

## 2018-01-20 RX ADMIN — LEVOTHYROXINE SODIUM 50 MCG: 50 TABLET ORAL at 07:56

## 2018-01-20 RX ADMIN — LEVETIRACETAM 1000 MG: 10 INJECTION INTRAVENOUS at 03:27

## 2018-01-20 RX ADMIN — CHLORHEXIDINE GLUCONATE 15 ML: 1.2 RINSE ORAL at 07:57

## 2018-01-20 RX ADMIN — SODIUM CHLORIDE: 9 INJECTION, SOLUTION INTRAVENOUS at 03:11

## 2018-01-20 RX ADMIN — TACROLIMUS 2.5 MG: 0.5 CAPSULE ORAL at 11:40

## 2018-01-20 RX ADMIN — HYDRALAZINE HYDROCHLORIDE 10 MG: 20 INJECTION INTRAMUSCULAR; INTRAVENOUS at 12:34

## 2018-01-20 RX ADMIN — CLONIDINE HYDROCHLORIDE 0.1 MG: 0.1 TABLET ORAL at 20:15

## 2018-01-20 RX ADMIN — PREDNISONE 5 MG: 5 TABLET ORAL at 08:01

## 2018-01-20 RX ADMIN — HEPARIN SODIUM 5000 UNITS: 5000 INJECTION, SOLUTION INTRAVENOUS; SUBCUTANEOUS at 22:15

## 2018-01-20 RX ADMIN — HEPARIN SODIUM 5000 UNITS: 5000 INJECTION, SOLUTION INTRAVENOUS; SUBCUTANEOUS at 06:06

## 2018-01-20 RX ADMIN — CLONIDINE HYDROCHLORIDE 0.1 MG: 0.1 TABLET ORAL at 08:01

## 2018-01-20 RX ADMIN — PANTOPRAZOLE SODIUM 40 MG: 40 INJECTION, POWDER, FOR SOLUTION INTRAVENOUS at 08:05

## 2018-01-20 RX ADMIN — Medication 150 MG: at 16:10

## 2018-01-20 RX ADMIN — LEVETIRACETAM 1000 MG: 10 INJECTION INTRAVENOUS at 16:09

## 2018-01-20 RX ADMIN — MEROPENEM AND SODIUM CHLORIDE 1 G: 1 INJECTION, SOLUTION INTRAVENOUS at 16:30

## 2018-01-20 RX ADMIN — FENTANYL CITRATE 50 MCG: 50 INJECTION, SOLUTION INTRAMUSCULAR; INTRAVENOUS at 20:15

## 2018-01-20 NOTE — PROGRESS NOTES
Neurology Progress Note    ____________________________________________________________    Admission Summary:    Gem Jade is a 72 year old female year old with renal transplant, DM with gastroparesis, polyneuropathy, retinopathy, and chronic UTI will indwelling catheter who presents with decreased responsiveness requiring intubation.      She has had a similar presentation and was admitted last week with negative MRI and EEG showing severe encephalopathy without seizures.    She was treated with antibiotics and supportive care with gradual improvement, without clear elucidation of the underlying pathology (possible UTI, possible preadmission hypoglycemia).     On this admission, she was intubated in the ED and an ABG post-intubation did not show CO2 retention (she was bagged on the way to the hospital by EMS).  She takes multiple medications that may be sedating including: gabapentin, trileptal, tramadol and baclofen (baclofen can produce sedation in somnolence with excess ingestion or withdrawal).    She underwent MRI/MRA which was unrevealing with exception of a small area of left parietooccipital enhancement.  EEG showing sharp GPEDs which are more consistent with encephalopathy than seizures.     Impression:    Recurrent encephalopathy of unclear etiology, possibly toxic/metabolic/UTI, less likely seizure given multiple episodes of negative long-term monitoring     Recommendations:  1. Holding sedating medications  2. Trileptal, Gabapentin, and Baclofen levels pending  --Urine culture: no bacteria, colonized with Yeast  --tox screen negative with exception of Trileptal as above  3. Continuous EEG  4. Continue supportive cares per ICU  5. Keppra 1000mg BID  6. Will order CSF labs including paraneoplastic panel and chronic meningitis panels.     Please contact the Stroke/NCC Service with any questions.  Thank you.     Donovan Almanza MD  11:01 AM January 20, 2018  Vascular Neurology Fellow     Critical Care  time: 45 minutes       ____________________________________________________________________        Medications:      Current Facility-Administered Medications:      levETIRAcetam (KEPPRA) intermittent infusion 1,000 mg, 1,000 mg, Intravenous, Q12H, Roel Garnett MD, 1,000 mg at 01/20/18 0327     glucose 40 % gel 15-30 g, 15-30 g, Oral, Q15 Min PRN **OR** dextrose 50 % injection 25-50 mL, 25-50 mL, Intravenous, Q15 Min PRN **OR** glucagon injection 1 mg, 1 mg, Subcutaneous, Q15 Min PRN, Santiago Frey MD     naloxone (NARCAN) injection 0.1-0.4 mg, 0.1-0.4 mg, Intravenous, Q2 Min PRN, Santiago Frey MD     chlorhexidine (PERIDEX) 0.12 % solution 15 mL, 15 mL, Mouth/Throat, Q12H, Santiago Frey MD, 15 mL at 01/20/18 0757     insulin aspart (NovoLOG) inj (RAPID ACTING), 1-4 Units, Subcutaneous, Q4H, Santiago Frey MD, 1 Units at 01/19/18 1807     propofol (DIPRIVAN) infusion, 5-75 mcg/kg/min, Intravenous, Continuous **AND** propofol (DIPRIVAN) infusion 10-20 mg, 10-20 mg, Intravenous, Q30 Min PRN **AND** CK total, , , CONDITIONAL (SPECIFY) **AND** Triglycerides, , , CONDITIONAL (SPECIFY), Santiago Frey MD     pantoprazole (PROTONIX) 40 mg IV push injection, 40 mg, Intravenous, Daily, Santiago Frey MD, 40 mg at 01/20/18 0805     fentaNYL (PF) (SUBLIMAZE) injection 25-50 mcg, 25-50 mcg, Intravenous, Q1H PRN, Santiago Frey MD, 25 mcg at 01/20/18 0809     ondansetron (ZOFRAN-ODT) ODT tab 4 mg, 4 mg, Oral, Q6H PRN **OR** ondansetron (ZOFRAN) injection 4 mg, 4 mg, Intravenous, Q6H PRN, Santiago Frey MD     senna-docusate (SENOKOT-S;PERICOLACE) 8.6-50 MG per tablet 1 tablet, 1 tablet, Oral, BID PRN **OR** senna-docusate (SENOKOT-S;PERICOLACE) 8.6-50 MG per tablet 2 tablet, 2 tablet, Oral, BID PRN, Santiago Frey MD     potassium chloride SA (K-DUR/KLOR-CON M) CR tablet 20-40 mEq, 20-40 mEq,  Oral, Q2H PRN, Santiago Frey MD     potassium chloride (KLOR-CON) Packet 20-40 mEq, 20-40 mEq, Oral or Feeding Tube, Q2H PRN, Santiago Frey MD, 40 mEq at 01/19/18 0538     potassium chloride 10 mEq in 100 mL sterile water intermittent infusion (premix), 10 mEq, Intravenous, Q1H PRN, Santiago Frey MD     potassium chloride 10 mEq in 100 mL intermittent infusion with 10 mg lidocaine, 10 mEq, Intravenous, Q1H PRN, Santiago Frey MD     potassium chloride 20 mEq in 50 mL intermittent infusion, 20 mEq, Intravenous, Q1H PRN, Santiago Frey MD     magnesium sulfate 4 g in 100 mL sterile water (premade), 4 g, Intravenous, Q4H PRN, Santiago Frey MD     sodium phosphate 15 mmol in D5W intermittent infusion, 15 mmol, Intravenous, Daily PRN, Santiago Frey MD     sodium phosphate 20 mmol in D5W intermittent infusion, 20 mmol, Intravenous, Q6H PRN, Santiago Frey MD     sodium phosphate 25 mmol in D5W intermittent infusion, 25 mmol, Intravenous, Q8H PRN, Santiago Frey MD     heparin sodium PF injection 5,000 Units, 5,000 Units, Subcutaneous, Q8H, Santiago Frey MD, 5,000 Units at 01/20/18 0606     0.9% sodium chloride infusion, , Intravenous, Continuous, Santiago Frey MD, Last Rate: 100 mL/hr at 01/20/18 0311     aspirin chewable tablet 81 mg, 81 mg, Oral or Feeding Tube, Daily, Santiago Frey MD, 81 mg at 01/20/18 0801     cloNIDine (CATAPRES) tablet 0.1 mg, 0.1 mg, Oral or Feeding Tube, BID, Santiago Frey MD, 0.1 mg at 01/20/18 0801     [START ON 2/8/2018] cyanocobalamin (VITAMIN B12) injection 1,000 mcg, 1,000 mcg, Intramuscular, Q30 Days, Santiago Frey MD     levothyroxine (SYNTHROID/LEVOTHROID) tablet 50 mcg, 50 mcg, Oral or Feeding Tube, [facility], [place], Santiago Lemus MD, 50 mcg at 01/20/18 0756     predniSONE (DELTASONE) tablet 5  "mg, 5 mg, Oral or Feeding Tube, Daily, Santiago Frey MD, 5 mg at 01/20/18 0801     tacrolimus (GENERIC EQUIVALENT) capsule 2.5 mg, 2.5 mg, Oral, BID IS, Santiago Frey MD, 2.5 mg at 01/19/18 2314     labetalol (NORMODYNE/TRANDATE) injection 10 mg, 10 mg, Intravenous, Q2H PRN, Santiago Frey MD     hydrALAZINE (APRESOLINE) injection 10 mg, 10 mg, Intravenous, Q4H PRN, Santiago Frey MD     meropenem (MERREM) intermittent 1g in NS PREMIX, 1 g, Intravenous, Q8H, Santiago Frey MD, 1 g at 01/20/18 0755      Vital Signs:    /81  Pulse 80  Temp 98.2  F (36.8  C)  Resp 11  Ht 1.676 m (5' 6\")  Wt 101.5 kg (223 lb 12.3 oz)  SpO2 100%  BMI 36.12 kg/m2    Neuro:  Intubated, no sedation.  Patient follows commands, comprehension intact. Communicates with writing.  No CN abnormalities.  Moves all 4 extremities.     Labs/Studies:  CBC:       Recent Labs  Lab 01/20/18  0631 01/19/18  0445 01/18/18  1532   WBC 7.1 11.0 4.7   RBC 3.75* 3.88 3.54*   HGB 11.3* 11.6* 10.9*   HCT 34.4* 35.7 33.1*    140* 134*     Basic Metabolic Panel:   Recent Labs   Lab Test  01/20/18   0720  01/20/18   0631  01/19/18   1615  01/19/18   0445   NA  143  Canceled, Test credited   --   141   POTASSIUM  3.4  Canceled, Test credited  4.1  3.3*   CHLORIDE  110*  Canceled, Test credited   --   107   CO2  23  Canceled, Test credited   --   26   BUN  14  Canceled, Test credited   --   13   CR  0.90  Canceled, Test credited   --   0.96   GLC  116*  Canceled, Test credited   --   202*   ROGER  8.6  Canceled, Test credited   --   8.6     Liver panel:  Recent Labs   Lab Test  01/20/18   0720  01/20/18   0631  01/19/18   0445  01/18/18   1532  01/18/18   1420   PROTTOTAL  7.1  Canceled, Test credited  7.1  7.2  Canceled, Test credited   ALBUMIN  2.6*  Canceled, Test credited  2.6*  2.8*  Canceled, Test credited   BILITOTAL  0.4  Canceled, Test credited  0.4  0.7  Canceled, Test " credited   ALKPHOS  106  Canceled, Test credited  92  86  Canceled, Test credited   AST  22  Canceled, Test credited  18  13  Canceled, Test credited   ALT  20  Canceled, Test credited  15  13  Canceled, Test credited     INR:  Recent Labs   Lab Test  11/06/17   1652  04/26/17   2338  04/26/17   0638  03/08/17   1243  09/02/16   0838   INR  1.09  1.03  1.06  1.01  1.09      Lipid Profile:  Recent Labs   Lab Test  12/05/16   1210  02/05/15   1250  05/16/13   1240  06/16/11   1230  11/07/10   1250   CHOL  164  106  244*  179  195   HDL  46*  43*  42*  50  57   LDL  64  18  149*  83  93   TRIG  272*  223*  262*  226*  221*   CHOLHDLRATIO   --   2.5  6.0*  3.6  3.4     A1C:   Recent Labs   Lab Test  01/12/18   0648  12/19/17   1602  11/14/17   0455  07/13/17   1105  12/05/16   1210   A1C  6.3*  6.7*  7.7*  5.6  7.4*     Troponin I:   Recent Labs   Lab Test  11/13/17   1613  04/19/17   0005  01/10/16   1408  12/18/15   1315  08/24/15   0030  03/18/14   0043  05/19/13   0625  04/11/11   1455   TROPI  <0.015  <0.015  The 99th percentile for upper reference range is 0.045 ug/L.  Troponin values in   the range of 0.045 - 0.120 ug/L may be associated with risks of adverse   clinical events.    <0.015  The 99th percentile for upper reference range is 0.045 ug/L.  Troponin values in   the range of 0.045 - 0.120 ug/L may be associated with risks of adverse   clinical events.    <0.015  The 99th percentile for upper reference range is 0.045 ug/L.  Troponin values in   the range of 0.045 - 0.120 ug/L may be associated with risks of adverse   clinical events.    <0.015  The 99th percentile for upper reference range is 0.045 ug/L.  Troponin values in   the range of 0.045 - 0.120 ug/L may be associated with risks of adverse   clinical events.    0.012  <0.012  <0.012         Imaging:  Relevant findings as per the Impression above.

## 2018-01-20 NOTE — PROGRESS NOTES
EEG CLINICAL NEUROPHYSIOLOGY PRELIMINARY REPORT    EEG through approx 1030 this morning reviewed. Generalized periodi sharp waves reduced in persistence around midnight and essentially stopped around 0300 today. As of this morning 6 Hz posterior dominant rhythm with moderate excessive theta and delta. There are realtively frequent eyeblinks suggesting increased level of alertness as well.    EEG indicates improvement in encephalopathy tho still would rate encephalopathy as moderate. Still not clear whether generalized periodic sharp activity a manifestation of atypical nonconvulsive status epilepticus that resolved with AED treatment or whether she just spontaneously improved and time course of EEG changes in response to AEDs was coincidental.    Discussed with neurocrit. Consolidate full report to follow.    Trey Avina MD  Pager [phone]

## 2018-01-20 NOTE — PROGRESS NOTES
Day 2   Checked impedances and quality of study  ORDERED VERBAL: DR HINES  READING: KARLA HSU ALERT AND CONSCIOUS

## 2018-01-20 NOTE — PLAN OF CARE
Problem: Patient Care Overview  Goal: Individualization & Mutuality  Outcome: No Change  Contact isolation  Obtunded/minimally-responsive.  Continuous EEG in process.  Earlier this afternoon pt rec'd significant dose of IV versed, became hypotensive and was placed in trendelenburg position.    Over time BP recovered, HOB is now flat and SBP in 115-130s, DBP still lower.  Pt does not wake, but with repeated stim flutters eyelids and occasionally withdraws from noxious stim.   updated by RN and neuro MD by phone.

## 2018-01-20 NOTE — PROGRESS NOTES
EEG CLINICAL NEUROPHYSIOLOGY PRELIMINARY REPORT    First 8 hours of video-EEG monitoring reviewed. Moderate diffuse delta. Generalized periodic sharp waves usually at 1.0 Hz or less. Midazolam 7 mg completed at approx 1430 results in complete cessation of generalized periodic discharges together with some signs of sedation. Discussion with clinical team indicates some improvement in mental status. Subsequently levetiracetam 1000 mg infused and approx 30 min later generalized perioidic sharp waves stop again. She arouses with a cough about 30 min later with immediate return of generalized periodic sharp waves.    Study consistent with severe diffuse encephalopathy. Periodic sharp waves at less than 2.5 Hz are not generally considered to be an ictal pattern. Generalized periodic sharp waves may respond to midazolam and both clinical and EEG response is considered necessary to confirm diagnosis of nonconvulsive status epilepticus in atypical situations. Some information available thus far suggests that her generalized periodic sharp waves are stimulus responsive, that is they respond to arousal. If this is a consistent pattern this would support the idea that the periodic sharp waves were manifestation of encephalopathy rather than of epilepsy.    Will discuss further with neurology/neurocrit.    Trey Avina MD  Pager [phone]

## 2018-01-20 NOTE — PROGRESS NOTES
Neurology Progress Note    ____________________________________________________________    Admission Summary:  Gem Jade is a 72 year old female year old with renal transplant, DM with gastroparesis, polyneuropathy, retinopathy, and chronic UTI will indwelling catheter who presents with decreased responsiveness requiring intubation.  She had a similar admission last week with negative MRI and EEG showing severe encephalopathy without seizures.  She was treated with antibiotics and supportive care with gradual improvement, without clear elucidation of the underlying pathology (possible UTI, possible preadmission hypoglycemia).  On this admission, she was intubated in the ED and an ABG post-intubation did not show CO2 retention (she was bagged on the way to the hospital by EMS).  She takes multiple medications that may be sedating including: gabapentin, trileptal, tramadol and baclofen (baclofen can produce sedation in somnolence with excess ingestion or withdrawal).    She underwent MRI/MRA which was unrevealing with exception of a small area of left parietooccipital enhancement.  EEG showing GPEDs which are not clearly epileptifrom, she was given 7mg of Versed with apparent transient improvement (eye opening, tracking), but this was not sustained.  Started on Keppra    Evaluation: toxicology screen pending, UA dirty (but has chronic indwelling rico), CBC, BMP unremarkable.  Vital signs stable with large discrepancy in BP obtained in UEs vs. LEs     Impression:  1. Recurrent encephalopathy of unclear etiology, possibly toxic/metabolic/UTI, less likely seizure given multiple episodes of negative long-term monitoring  2. Fluttering eye movements and altered mental status--possible NCSE        Recommendations:  1. Holding sedating medications  2. Trileptal, Gabapentin, and Baclofen levels pending  --Urine culture: no bacteria, colonized with Yeast  --tox screen negative with exception of Trileptal as above  3.  Continuous EEG  4. Continue supportive care per ICU  5. Keppra 1000mg BID     Please contact the Stroke/NCC Service with any questions.  Thank you.        Roel Garnett MD, MS  Neurology  January 19, 2018     Pager: [phone]     Critical Care time: 45 minutes       ____________________________________________________________________        Medications:      Current Facility-Administered Medications:      levETIRAcetam (KEPPRA) intermittent infusion 1,000 mg, 1,000 mg, Intravenous, Q12H, Roel Garnett MD, 1,000 mg at 01/19/18 1607     glucose 40 % gel 15-30 g, 15-30 g, Oral, Q15 Min PRN **OR** dextrose 50 % injection 25-50 mL, 25-50 mL, Intravenous, Q15 Min PRN **OR** glucagon injection 1 mg, 1 mg, Subcutaneous, Q15 Min PRN, Santiago Frey MD     naloxone (NARCAN) injection 0.1-0.4 mg, 0.1-0.4 mg, Intravenous, Q2 Min PRN, Santiago Frey MD     chlorhexidine (PERIDEX) 0.12 % solution 15 mL, 15 mL, Mouth/Throat, Q12H, Santiago Frey MD, 15 mL at 01/19/18 2315     insulin aspart (NovoLOG) inj (RAPID ACTING), 1-4 Units, Subcutaneous, Q4H, Santiago Frey MD, 1 Units at 01/19/18 1807     propofol (DIPRIVAN) infusion, 5-75 mcg/kg/min, Intravenous, Continuous **AND** propofol (DIPRIVAN) infusion 10-20 mg, 10-20 mg, Intravenous, Q30 Min PRN **AND** CK total, , , CONDITIONAL (SPECIFY) **AND** Triglycerides, , , CONDITIONAL (SPECIFY), Santiago Frey MD     pantoprazole (PROTONIX) 40 mg IV push injection, 40 mg, Intravenous, Daily, Santiago Frey MD, 40 mg at 01/19/18 1021     fentaNYL (PF) (SUBLIMAZE) injection 25-50 mcg, 25-50 mcg, Intravenous, Q1H PRN, Santiago Frey MD, 50 mcg at 01/18/18 8423     ondansetron (ZOFRAN-ODT) ODT tab 4 mg, 4 mg, Oral, Q6H PRN **OR** ondansetron (ZOFRAN) injection 4 mg, 4 mg, Intravenous, Q6H PRN, Santiago Frey MD     senna-docusate (SENOKOT-S;PERICOLACE) 8.6-50 MG per  tablet 1 tablet, 1 tablet, Oral, BID PRN **OR** senna-docusate (SENOKOT-S;PERICOLACE) 8.6-50 MG per tablet 2 tablet, 2 tablet, Oral, BID PRN, Santiago Frey MD     potassium chloride SA (K-DUR/KLOR-CON M) CR tablet 20-40 mEq, 20-40 mEq, Oral, Q2H PRN, Santiago Frey MD     potassium chloride (KLOR-CON) Packet 20-40 mEq, 20-40 mEq, Oral or Feeding Tube, Q2H PRN, Santiago Frey MD, 40 mEq at 01/19/18 0538     potassium chloride 10 mEq in 100 mL sterile water intermittent infusion (premix), 10 mEq, Intravenous, Q1H PRN, Santigao Fery MD     potassium chloride 10 mEq in 100 mL intermittent infusion with 10 mg lidocaine, 10 mEq, Intravenous, Q1H PRN, Santiago Frey MD     potassium chloride 20 mEq in 50 mL intermittent infusion, 20 mEq, Intravenous, Q1H PRN, Santiago Frey MD     magnesium sulfate 4 g in 100 mL sterile water (premade), 4 g, Intravenous, Q4H PRN, Santiago Frey MD     sodium phosphate 15 mmol in D5W intermittent infusion, 15 mmol, Intravenous, Daily PRN, Santiago Frey MD     sodium phosphate 20 mmol in D5W intermittent infusion, 20 mmol, Intravenous, Q6H PRN, Santiago Frey MD     sodium phosphate 25 mmol in D5W intermittent infusion, 25 mmol, Intravenous, Q8H PRN, Santiago Frey MD     heparin sodium PF injection 5,000 Units, 5,000 Units, Subcutaneous, Q8H, Santiago Frey MD, 5,000 Units at 01/19/18 2315     0.9% sodium chloride infusion, , Intravenous, Continuous, Santiago Frey MD, Last Rate: 100 mL/hr at 01/19/18 0108     aspirin chewable tablet 81 mg, 81 mg, Oral or Feeding Tube, Daily, Santiago Frey MD, 81 mg at 01/19/18 1022     cloNIDine (CATAPRES) tablet 0.1 mg, 0.1 mg, Oral or Feeding Tube, BID, Santiago Frey MD, 0.1 mg at 01/19/18 4059     [START ON 2/8/2018] cyanocobalamin (VITAMIN B12) injection 1,000 mcg,  "1,000 mcg, Intramuscular, Q30 Days, Santiago Frey MD     levothyroxine (SYNTHROID/LEVOTHROID) tablet 50 mcg, 50 mcg, Oral or Feeding Tube, QAM AC, Santiago Frey MD, 50 mcg at 01/19/18 1022     predniSONE (DELTASONE) tablet 5 mg, 5 mg, Oral or Feeding Tube, Daily, Santiago Frey MD, 5 mg at 01/19/18 1023     tacrolimus (GENERIC EQUIVALENT) capsule 2.5 mg, 2.5 mg, Oral, BID IS, Santiago Frey MD, 2.5 mg at 01/19/18 2314     labetalol (NORMODYNE/TRANDATE) injection 10 mg, 10 mg, Intravenous, Q2H PRN, Santiago Frey MD     hydrALAZINE (APRESOLINE) injection 10 mg, 10 mg, Intravenous, Q4H PRN, Santiago Frey MD     meropenem (MERREM) intermittent 1g in NS PREMIX, 1 g, Intravenous, Q8H, Santiago Frey MD, 1 g at 01/19/18 2315      Vital Signs:  B/P: 123/49, T: 99.3, P: 80, R: 12    /49  Pulse 80  Temp 99.3  F (37.4  C)  Resp 12  Ht 1.676 m (5' 6\")  Wt 101.2 kg (223 lb 1.7 oz)  SpO2 100%  BMI 36.01 kg/m2    Neuro:  Intubated, no sedation, patient does not initially open eyes to voice or noxious stimuli. 45 minutes after Versed the patients eyes are open and she tracks the examiner, but does not follow commands.  Squeezes hands spontaneously on R and L, but not to command.  Actively resists passive eye opening.  PERRL, blinks to threat.  Face symmetric, coughing on ET tube. Flickers/grimmaces ot noxious stimuli in UEs, more pronounced purposeful withdrawal in LEs.  No pathologic reflexes, possibly due to underlying neuropathy.       Labs/Studies:  CBC:     Recent Labs  Lab 01/19/18  0445 01/18/18  1532 01/18/18  1420   WBC 11.0 4.7 Canceled, Test credited, specimen discarded   RBC 3.88 3.54* Canceled, Test credited, specimen discarded   HGB 11.6* 10.9* Canceled, Test credited, specimen discarded   HCT 35.7 33.1* Canceled, Test credited, specimen discarded   * 134* Canceled, Test credited, specimen discarded "     Basic Metabolic Panel:   Recent Labs   Lab Test  01/19/18   1615  01/19/18   0445  01/18/18   1532  01/18/18   1420   NA   --   141  138  Canceled, Test credited   POTASSIUM  4.1  3.3*  3.5  Canceled, Test credited   CHLORIDE   --   107  103  Canceled, Test credited   CO2   --   26  26  Canceled, Test credited   BUN   --   13  13  Canceled, Test credited   CR   --   0.96  0.99  Canceled, Test credited   GLC   --   202*  264*  Canceled, Test credited   ROGER   --   8.6  8.7  Canceled, Test credited     Liver panel:  Recent Labs   Lab Test  01/19/18   0445  01/18/18   1532  01/18/18   1420  01/14/18   0353  01/11/18   1502   PROTTOTAL  7.1  7.2  Canceled, Test credited  6.1*  8.2   ALBUMIN  2.6*  2.8*  Canceled, Test credited  2.3*  3.1*   BILITOTAL  0.4  0.7  Canceled, Test credited  0.4  0.3   ALKPHOS  92  86  Canceled, Test credited  67  86   AST  18  13  Canceled, Test credited  14  24   ALT  15  13  Canceled, Test credited  11  16     INR:  Recent Labs   Lab Test  11/06/17   1652  04/26/17   2338  04/26/17   0638  03/08/17   1243  09/02/16   0838   INR  1.09  1.03  1.06  1.01  1.09      Lipid Profile:  Recent Labs   Lab Test  12/05/16   1210  02/05/15   1250  05/16/13   1240  06/16/11   1230  11/07/10   1250   CHOL  164  106  244*  179  195   HDL  46*  43*  42*  50  57   LDL  64  18  149*  83  93   TRIG  272*  223*  262*  226*  221*   CHOLHDLRATIO   --   2.5  6.0*  3.6  3.4     A1C:   Recent Labs   Lab Test  01/12/18   0648  12/19/17   1602  11/14/17   0455  07/13/17   1105  12/05/16   1210   A1C  6.3*  6.7*  7.7*  5.6  7.4*     Troponin I:   Recent Labs   Lab Test  11/13/17   1613  04/19/17   0005  01/10/16   1408  12/18/15   1315  08/24/15   0030  03/18/14   0043  05/19/13   0625  04/11/11   1455   TROPI  <0.015  <0.015  The 99th percentile for upper reference range is 0.045 ug/L.  Troponin values in   the range of 0.045 - 0.120 ug/L may be associated with risks of adverse   clinical events.    <0.015  The  99th percentile for upper reference range is 0.045 ug/L.  Troponin values in   the range of 0.045 - 0.120 ug/L may be associated with risks of adverse   clinical events.    <0.015  The 99th percentile for upper reference range is 0.045 ug/L.  Troponin values in   the range of 0.045 - 0.120 ug/L may be associated with risks of adverse   clinical events.    <0.015  The 99th percentile for upper reference range is 0.045 ug/L.  Troponin values in   the range of 0.045 - 0.120 ug/L may be associated with risks of adverse   clinical events.    0.012  <0.012  <0.012         Imaging:  Relevant findings as per the Impression above.

## 2018-01-20 NOTE — PROGRESS NOTES
"[facility]  Comprehensive Daily ICU Note        Gem Jade MRN# [id number]   Age: 72 year old YOB: 1945     Date of Admission: 1/18/2018    Primary care provider: Marivel Barcenas     CODE STATUS: FULL      Problem List:   Active Problems:    Vancomycin resistant Enterococcus    Altered mental state    UTI (urinary tract infection) with E. Coli    Sepsis (H)    Encephalopathy acute         Treatment goals for next 24 hours:   Hold all sedating medications  Control blood pressure  Continue EEG  Pressure support mode but no plans for extubation today    Radha Ruffin MD      Subjective/ Last 24 hours:   Ms. Gem Jade is a 72 yof with a history of neurogenic bladder with chronic indwelling rico, recurrent UTIs, and kidney transplant (1999) maintained on tacrolimus and 5 mg prednisone who was recently admitted to the ICU and [facility] hospital 1/11 through 1/15 in the setting of possible status epilepticus (NCSE) requiring intubation.     Prior hospital course - \"Briefly, she was admitted to the hospital on  with altered mental status which was thought to be 2/2 hypoglycemia with glucose 28.  Her rico catheter was exchanged in the ED given dirty UA and long history of recurrent UTIs.  She was tested for influenza and found to be negative.  CT head was obtained and negative for acute pathology.  She was admitted to the floor and underwent ID work up and consult and well as neuro consult for persistently altered mental status.  Infection was felt to be unlikely due to normal WBC, normal procal and absence of fevers. Altered mental status was persistent, EEG was obtained and she was found to be status epilepticus necessitating transfer to the ICU for further cares. She was treated for infection with meropenem and then ceftriaxone with improvement in MS and extubation and ultimately discharged 1/15.     Patient was then found unresponsive by her . Upon EMS arrival, the " patient was responsive only to painful stimuli, but was breathing on her own. En route, the patient began having apneic episodes with an oxygen saturation lowest at 85%, so she was bagged. EMS tried 0.4 mg Narcan without change. She was intubated on arrival to ICU for GCS 9 but inability to protect airway. FAST exam negative, bedside ECHO with no overt abnormality. ABG was without hypercarbia UA was dirty thus presumption was encephalopathy 2/2 infection. She was bolused fluid, meropenem as well propofol and admitted to ICU    Today, 1/20, is awake and responds to commands but essentially immobile and staring straight ahead unless directly stimulated. EEG shows encephalopathy but per Neurology there is still some small chance she has been in non convulsive status epilepticus.           Mechanical Ventilation/Vitalsigns/IsandOs:       Temp:  [98.1  F (36.7  C)-101.2  F (38.4  C)] 98.2  F (36.8  C)  Heart Rate:  [] 82  Resp:  [0-27] 11  BP: ()/() 173/81  FiO2 (%):  [40 %-50 %] 40 %  SpO2:  [100 %] 100 %      Intake/Output Summary (Last 24 hours) at 01/20/18 1434  Last data filed at 01/20/18 1400   Gross per 24 hour   Intake             2360 ml   Output             1695 ml   Net              665 ml   +300ml      Ventilation Mode: CMV/AC  FiO2 (%): 40 %  Rate Set (breaths/minute): 12 breaths/min  Tidal Volume Set (mL): 550 mL  PEEP (cm H2O): 5 cmH2O  Oxygen Concentration (%): 40 %  Resp: 11    Day since 1/18     Peak airway pressure: teens  Auto-PEEP:  none         Physical Examination:     General: Stated age, eyes open  HEENT: eyes open, ET tube  Lungs: LCTAB anteriorly  CVS: RRR, S1S2  Abdomen: obese, soft  Extremities/musculoskeletal: warm, palpable pulses  Neurology: follows commands and tracks with eyes but when not stimulated is completely still and staring straight ahead.  Skin: normal  Psychiatry: unable  Exam of Line sites:  No lines       Hospital Procedures   Intubation and mechanical  ventilation   CXRs   Brain MRI  EEG  CT Abd/Pelvis  Head CT         Feeding/Glucose:   Tube feeds    Blood glucose/Insulin requirement last 24 hours: small amount of sliding scale         Medications:       levETIRAcetam  1,000 mg Intravenous Q12H     chlorhexidine  15 mL Mouth/Throat Q12H     insulin aspart  1-4 Units Subcutaneous Q4H     pantoprazole (PROTONIX) IV PEDS/NICU  40 mg Intravenous Daily     heparin  5,000 Units Subcutaneous Q8H     aspirin  81 mg Oral or Feeding Tube Daily     cloNIDine  0.1 mg Oral or Feeding Tube BID     [START ON 2/8/2018] cyanocobalamin  1,000 mcg Intramuscular Q30 Days     levothyroxine  50 mcg Oral or Feeding Tube QAM AC     predniSONE  5 mg Oral or Feeding Tube Daily     tacrolimus  2.5 mg Oral BID IS     meropenem  1 g Intravenous Q8H             Labs/Diagnostic studies:       ROUTINE ICU LABS (Last four results)  CMP  Recent Labs  Lab 01/20/18  0720 01/20/18  0631 01/19/18  1615 01/19/18  0445 01/18/18  1532    Canceled, Test credited  --  141 138   POTASSIUM 3.4 Canceled, Test credited 4.1 3.3* 3.5   CHLORIDE 110* Canceled, Test credited  --  107 103   CO2 23 Canceled, Test credited  --  26 26   ANIONGAP 10 Canceled, Test credited  --  8 9   * Canceled, Test credited  --  202* 264*   BUN 14 Canceled, Test credited  --  13 13   CR 0.90 Canceled, Test credited  --  0.96 0.99   GFRESTIMATED 62 Canceled, Test credited  --  57* 55*   GFRESTBLACK 75 Canceled, Test credited  --  69 67   ROGER 8.6 Canceled, Test credited  --  8.6 8.7   PROTTOTAL 7.1 Canceled, Test credited  --  7.1 7.2   ALBUMIN 2.6* Canceled, Test credited  --  2.6* 2.8*   BILITOTAL 0.4 Canceled, Test credited  --  0.4 0.7   ALKPHOS 106 Canceled, Test credited  --  92 86   AST 22 Canceled, Test credited  --  18 13   ALT 20 Canceled, Test credited  --  15 13     CBC  Recent Labs  Lab 01/20/18  0631 01/19/18  0445 01/18/18  1532 01/18/18  1420   WBC 7.1 11.0 4.7 Canceled, Test credited, specimen discarded    RBC 3.75* 3.88 3.54* Canceled, Test credited, specimen discarded   HGB 11.3* 11.6* 10.9* Canceled, Test credited, specimen discarded   HCT 34.4* 35.7 33.1* Canceled, Test credited, specimen discarded   MCV 92 92 94 Canceled, Test credited, specimen discarded   MCH 30.1 29.9 30.8 Canceled, Test credited, specimen discarded   MCHC 32.8 32.5 32.9 Canceled, Test credited, specimen discarded   RDW 15.1* 14.3 14.1 Canceled, Test credited, specimen discarded    140* 134* Canceled, Test credited, specimen discarded     INRNo lab results found in last 7 days.  Arterial Blood Gas  Recent Labs  Lab 01/19/18  0543 01/18/18  1545 01/13/18  1543   PH 7.50* 7.43 7.36   PCO2 33* 39 41   PO2 125* 169* 151*   HCO3 26 26 23   O2PER  --  Ventilator 40%         Cultures: Urine with E. Coli    Recent Labs  Lab 01/18/18  1532 01/18/18  1445 01/18/18  1420 01/18/18  0754   CULT No growth after 2 days 10,000 to 50,000 colonies/mLYeastCulture in progress* No growth after 2 days Canceled, Test creditedDuplicate request2 SETS OF BCS IN PROCESS     Blood culture:  Invalid input(s): BC   Urine culture:  No results for input(s): URC in the last 168 hours.            Imaging:     CXR:  clear    CT:  Atrophy of brain         Assessment and Plan:     Summary:  Gem Jade is a 72 year old female admitted on 1/18/2018 for altered mental status.  I have personally reviewed the daily labs, imaging studies, cultures and discussed the case with referring physician and consulting physicians. My assessment and plan as follows:    Neurology and Psychiatry:  1. Acute encephalopathy - repeat presentation, seems metabolic in nature, was hypoglycemic on prior admission, had UTI recently treated - no outward signs of sepsis but immunocompromised host,  Possible seizure though last EEG negative, has polypharmacy with multiple sedating medications on her list.  2. Analgesia/Anxiety  Plan  -Hold home baclofen gabapentin Trileptal and tramadol  - hold  off sedative medications - goal RASS 0   - EEG until tomorrow  - Possible LP. Defer to Neurology service.   - Extubate when consistently more alert    Pulmonary:   1.  Acute hypoxic respiratory failure secondary to encephalopathy - stable on vent  Plan  - Start pressure support trials but won't extubate until she is consistently more alert.      Cardiovascular system:   1.Hypertensive - noted descrepancy between upper and lower extremity BP  2. Rhythm -sinus  3. Ischemia -no evidence  Plan   -Continue prior to admission aspirin clonidine and labetalol pravastatin  - Consider as needed's if systolic greater than 180 (lower extremities)    Renal/Electrolytes:  1. CKD - Stage IV CKD >  ESRD , Hx of renal transplant. Creatinine at baseline.   2. Neurogenic bladder with chronic rico (exchanged 1/11 from last admission)  3. Electrolyte abnormality - hypernatremia and hyperchloremia and hypokalemia  Plan  - continue tacrolimus and prednisone with tacrolimus levels by pharmacists  - renal following   - monitor function and electrolytes as needed with replacement per ICU protocols.  - generally avoid nephrotoxic agents such as NSAID, IV contrast unless specifically required  - change to hypotonic IVF     ID:  1.  Chronic recurrent urinary tract infections with chronic indwelling Rico, in an immunocompromised host ;s/p recent course abx 1/11 - 1/15 , dosed meropenem in ED, UA still dirty -  CT ABD/pelvis neg for  for pyelonephritis /hydronephrosis. Procalcitonin here normal, btu also normal last admission.   2. Hx of VRE and ESBL  Plan  - FUP cultures. Stop antibiotics if final culture result turns negative.    GI/:  Feeding tube in place and getting enteral feeds.     Nutrition:   Nutrition    Musculoskeletal/Rheumatology:  No issues  - Physical therapy and occupational therapy when able    Endocrine:   1. Hyperglycemia - known DM -   2. Hypothyroidism  3. Hyperparathyroidism- calcium stable  Plan  - ICU insulin  protocol, goal sugar <180  - hold off long acting insulin- continue with SS for now   - continue home levothyroxine     Heme/Onc:  1. Chronic anemia  2. Remote history of provoked PE.  Not currently on anticoagulation  Plan  - DVT prophylaxis  - Serial blood work.  Address as necessary     ICU prophylaxis:      DVT: subcutaneous heparin     VAP: As above     Stress ulcer: PPI     Restraints needed: yes     Lines   Central Line/PICC - placed n needed: n   Arterial line - placed n needed: n   Johnson catheter.  Needed: y       Prognosis:    Guarded longer term      Family update: not by me    Billing: total time spend providing critical care was 45 min, excluding procedure time.    Radha Ruffin MD  [phone]

## 2018-01-21 LAB
ANION GAP SERPL CALCULATED.3IONS-SCNC: 11 MMOL/L (ref 3–14)
BACTERIA SPEC CULT: ABNORMAL
BASE DEFICIT BLDA-SCNC: 2.9 MMOL/L
BASE DEFICIT BLDV-SCNC: NORMAL MMOL/L
BASE EXCESS BLDV CALC-SCNC: NORMAL MMOL/L
BUN SERPL-MCNC: 16 MG/DL (ref 7–30)
CALCIUM SERPL-MCNC: 8.2 MG/DL (ref 8.5–10.1)
CHLORIDE SERPL-SCNC: 107 MMOL/L (ref 94–109)
CO2 SERPL-SCNC: 23 MMOL/L (ref 20–32)
CREAT SERPL-MCNC: 1.04 MG/DL (ref 0.52–1.04)
ERYTHROCYTE [DISTWIDTH] IN BLOOD BY AUTOMATED COUNT: 15.2 % (ref 10–15)
GFR SERPL CREATININE-BSD FRML MDRD: 52 ML/MIN/1.7M2
GLUCOSE BLDC GLUCOMTR-MCNC: 106 MG/DL (ref 70–99)
GLUCOSE BLDC GLUCOMTR-MCNC: 80 MG/DL (ref 70–99)
GLUCOSE BLDC GLUCOMTR-MCNC: 82 MG/DL (ref 70–99)
GLUCOSE BLDC GLUCOMTR-MCNC: 88 MG/DL (ref 70–99)
GLUCOSE BLDC GLUCOMTR-MCNC: 98 MG/DL (ref 70–99)
GLUCOSE SERPL-MCNC: 99 MG/DL (ref 70–99)
HCO3 BLD-SCNC: 21 MMOL/L (ref 21–28)
HCO3 BLDV-SCNC: NORMAL MMOL/L (ref 21–28)
HCT VFR BLD AUTO: 34.1 % (ref 35–47)
HGB BLD-MCNC: 10.9 G/DL (ref 11.7–15.7)
Lab: ABNORMAL
MCH RBC QN AUTO: 30.1 PG (ref 26.5–33)
MCHC RBC AUTO-ENTMCNC: 32 G/DL (ref 31.5–36.5)
MCV RBC AUTO: 94 FL (ref 78–100)
O2/TOTAL GAS SETTING VFR VENT: ABNORMAL %
O2/TOTAL GAS SETTING VFR VENT: NORMAL %
OXYHGB MFR BLD: 98 % (ref 92–100)
OXYHGB MFR BLDV: NORMAL %
PCO2 BLD: 35 MM HG (ref 35–45)
PCO2 BLDV: NORMAL MM HG (ref 40–50)
PH BLD: 7.4 PH (ref 7.35–7.45)
PH BLDV: NORMAL PH (ref 7.32–7.43)
PLATELET # BLD AUTO: 149 10E9/L (ref 150–450)
PLATELET # BLD AUTO: 157 10E9/L (ref 150–450)
PO2 BLD: 112 MM HG (ref 80–105)
PO2 BLDV: NORMAL MM HG (ref 25–47)
POTASSIUM SERPL-SCNC: 3.7 MMOL/L (ref 3.4–5.3)
RBC # BLD AUTO: 3.62 10E12/L (ref 3.8–5.2)
SODIUM SERPL-SCNC: 141 MMOL/L (ref 133–144)
SPECIMEN SOURCE: ABNORMAL
TACROLIMUS BLD-MCNC: 33.1 UG/L (ref 5–15)
TME LAST DOSE: ABNORMAL H
WBC # BLD AUTO: 7 10E9/L (ref 4–11)

## 2018-01-21 PROCEDURE — A9270 NON-COVERED ITEM OR SERVICE: HCPCS | Mod: GY | Performed by: INTERNAL MEDICINE

## 2018-01-21 PROCEDURE — 40000275 ZZH STATISTIC RCP TIME EA 10 MIN

## 2018-01-21 PROCEDURE — P9047 ALBUMIN (HUMAN), 25%, 50ML: HCPCS | Performed by: INTERNAL MEDICINE

## 2018-01-21 PROCEDURE — 25000131 ZZH RX MED GY IP 250 OP 636 PS 637: Mod: GY | Performed by: INTERNAL MEDICINE

## 2018-01-21 PROCEDURE — 85027 COMPLETE CBC AUTOMATED: CPT | Performed by: INTERNAL MEDICINE

## 2018-01-21 PROCEDURE — 36600 WITHDRAWAL OF ARTERIAL BLOOD: CPT

## 2018-01-21 PROCEDURE — 40000061 ZZH STATISTIC EEG TIME EA 10 MIN

## 2018-01-21 PROCEDURE — 25000125 ZZHC RX 250: Performed by: INTERNAL MEDICINE

## 2018-01-21 PROCEDURE — 20000003 ZZH R&B ICU

## 2018-01-21 PROCEDURE — 36415 COLL VENOUS BLD VENIPUNCTURE: CPT | Performed by: INTERNAL MEDICINE

## 2018-01-21 PROCEDURE — 80048 BASIC METABOLIC PNL TOTAL CA: CPT | Performed by: INTERNAL MEDICINE

## 2018-01-21 PROCEDURE — 82805 BLOOD GASES W/O2 SATURATION: CPT | Performed by: INTERNAL MEDICINE

## 2018-01-21 PROCEDURE — 00000146 ZZHCL STATISTIC GLUCOSE BY METER IP

## 2018-01-21 PROCEDURE — 99291 CRITICAL CARE FIRST HOUR: CPT | Performed by: PSYCHIATRY & NEUROLOGY

## 2018-01-21 PROCEDURE — 25000132 ZZH RX MED GY IP 250 OP 250 PS 637: Mod: GY | Performed by: INTERNAL MEDICINE

## 2018-01-21 PROCEDURE — 25000128 H RX IP 250 OP 636: Performed by: INTERNAL MEDICINE

## 2018-01-21 PROCEDURE — 80197 ASSAY OF TACROLIMUS: CPT | Performed by: INTERNAL MEDICINE

## 2018-01-21 PROCEDURE — 25000128 H RX IP 250 OP 636: Performed by: PSYCHIATRY & NEUROLOGY

## 2018-01-21 PROCEDURE — 40000008 ZZH STATISTIC AIRWAY CARE

## 2018-01-21 PROCEDURE — 94003 VENT MGMT INPAT SUBQ DAY: CPT

## 2018-01-21 PROCEDURE — 85049 AUTOMATED PLATELET COUNT: CPT | Performed by: INTERNAL MEDICINE

## 2018-01-21 PROCEDURE — 99291 CRITICAL CARE FIRST HOUR: CPT | Performed by: INTERNAL MEDICINE

## 2018-01-21 RX ORDER — ALBUMIN (HUMAN) 12.5 G/50ML
12.5 SOLUTION INTRAVENOUS ONCE
Status: COMPLETED | OUTPATIENT
Start: 2018-01-21 | End: 2018-01-21

## 2018-01-21 RX ADMIN — ALBUMIN HUMAN 12.5 G: 0.25 SOLUTION INTRAVENOUS at 18:40

## 2018-01-21 RX ADMIN — SODIUM CHLORIDE 1000 ML: 9 INJECTION, SOLUTION INTRAVENOUS at 12:24

## 2018-01-21 RX ADMIN — LEVOTHYROXINE SODIUM 50 MCG: 50 TABLET ORAL at 08:34

## 2018-01-21 RX ADMIN — CHLORHEXIDINE GLUCONATE 15 ML: 1.2 RINSE ORAL at 08:30

## 2018-01-21 RX ADMIN — Medication 150 MG: at 08:34

## 2018-01-21 RX ADMIN — HEPARIN SODIUM 5000 UNITS: 5000 INJECTION, SOLUTION INTRAVENOUS; SUBCUTANEOUS at 17:04

## 2018-01-21 RX ADMIN — CLONIDINE HYDROCHLORIDE 0.1 MG: 0.1 TABLET ORAL at 20:20

## 2018-01-21 RX ADMIN — TACROLIMUS 2.5 MG: 0.5 CAPSULE ORAL at 00:30

## 2018-01-21 RX ADMIN — MEROPENEM AND SODIUM CHLORIDE 1 G: 1 INJECTION, SOLUTION INTRAVENOUS at 14:30

## 2018-01-21 RX ADMIN — LABETALOL HYDROCHLORIDE 10 MG: 5 INJECTION, SOLUTION INTRAVENOUS at 03:06

## 2018-01-21 RX ADMIN — PANTOPRAZOLE SODIUM 40 MG: 40 INJECTION, POWDER, FOR SOLUTION INTRAVENOUS at 08:34

## 2018-01-21 RX ADMIN — MEROPENEM AND SODIUM CHLORIDE 1 G: 1 INJECTION, SOLUTION INTRAVENOUS at 00:45

## 2018-01-21 RX ADMIN — MEROPENEM AND SODIUM CHLORIDE 1 G: 1 INJECTION, SOLUTION INTRAVENOUS at 22:30

## 2018-01-21 RX ADMIN — HEPARIN SODIUM 5000 UNITS: 5000 INJECTION, SOLUTION INTRAVENOUS; SUBCUTANEOUS at 08:34

## 2018-01-21 RX ADMIN — ASPIRIN 81 MG 81 MG: 81 TABLET ORAL at 08:34

## 2018-01-21 RX ADMIN — LEVETIRACETAM 1000 MG: 10 INJECTION INTRAVENOUS at 17:02

## 2018-01-21 RX ADMIN — CLONIDINE HYDROCHLORIDE 0.1 MG: 0.1 TABLET ORAL at 08:34

## 2018-01-21 RX ADMIN — LEVETIRACETAM 1000 MG: 10 INJECTION INTRAVENOUS at 03:16

## 2018-01-21 RX ADMIN — PREDNISONE 5 MG: 5 TABLET ORAL at 08:35

## 2018-01-21 RX ADMIN — POTASSIUM CHLORIDE AND SODIUM CHLORIDE: 450; 150 INJECTION, SOLUTION INTRAVENOUS at 08:10

## 2018-01-21 RX ADMIN — CHLORHEXIDINE GLUCONATE 15 ML: 1.2 RINSE ORAL at 22:00

## 2018-01-21 RX ADMIN — Medication 150 MG: at 20:20

## 2018-01-21 NOTE — PLAN OF CARE
Problem: Patient Care Overview  Goal: Individualization & Mutuality  Outcome: Improving  Opening eyes spontaneously, can nod/shake head appropriately to most questions.    Follows some commands to , move.    No sedation, PRN fentanyl used for comfort.   updated x2 by phone and once at bedside.    Did not tolerate PSV trial this afternoon, became apneic.  Only on PSV for about a half hour.  Required PRN antiHTN's for high BP.    Adequate UOP.

## 2018-01-21 NOTE — PROGRESS NOTES
"[facility]  Comprehensive Daily ICU Note        Gem Jade MRN# [id number]   Age: 72 year old YOB: 1945     Date of Admission: 1/18/2018    Primary care provider: Marivel Barcenas     CODE STATUS: FULL      Problem List:   Active Problems:    Vancomycin resistant Enterococcus    Altered mental state    UTI (urinary tract infection) with E. Coli    Sepsis (H)    Encephalopathy acute         Treatment goals for next 24 hours:   Hold all sedating medications  Control blood pressure  Continue EEG  Pressure support mode but no plans for extubation today    Radha Ruffin MD      Subjective/ Last 24 hours:   Ms. Gem Jade is a 72 yof with a history of neurogenic bladder with chronic indwelling rico, recurrent UTIs, and kidney transplant (1999) maintained on tacrolimus and 5 mg prednisone who was recently admitted to the ICU and [facility] hospital 1/11 through 1/15 in the setting of possible status epilepticus (NCSE) requiring intubation.     Prior hospital course - \"Briefly, she was admitted to the hospital on  with altered mental status which was thought to be 2/2 hypoglycemia with glucose 28.  Her rico catheter was exchanged in the ED given dirty UA and long history of recurrent UTIs.  She was tested for influenza and found to be negative.  CT head was obtained and negative for acute pathology.  She was admitted to the floor and underwent ID work up and consult and well as neuro consult for persistently altered mental status.  Infection was felt to be unlikely due to normal WBC, normal procal and absence of fevers. Altered mental status was persistent, EEG was obtained and she was found to be status epilepticus necessitating transfer to the ICU for further cares. She was treated for infection with meropenem and then ceftriaxone with improvement in MS and extubation and ultimately discharged 1/15.     Patient was then found unresponsive by her . Upon EMS arrival, the " patient was responsive only to painful stimuli, but was breathing on her own. En route, the patient began having apneic episodes with an oxygen saturation lowest at 85%, so she was bagged. EMS tried 0.4 mg Narcan without change. She was intubated on arrival to ICU for GCS 9 but inability to protect airway. FAST exam negative, bedside ECHO with no overt abnormality. ABG was without hypercarbia UA was dirty thus presumption was encephalopathy 2/2 infection. She was bolused fluid, meropenem as well propofol and admitted to ICU    Today, 1/21, is quite awake and responding to commands. Looks comfortable on PS.          Mechanical Ventilation/Vitalsigns/IsandOs:       Temp:  [97.3  F (36.3  C)-98.6  F (37  C)] 98.6  F (37  C)  Heart Rate:  [68-82] 75  Resp:  [0-28] 13  BP: ()/() 155/70  FiO2 (%):  [30 %-40 %] 30 %  SpO2:  [99 %-100 %] 100 %      Intake/Output Summary (Last 24 hours) at 01/21/18 0919  Last data filed at 01/21/18 0800   Gross per 24 hour   Intake          2042.92 ml   Output              895 ml   Net          1147.92 ml   +1.4L    Ventilation Mode: CPAP/PS  FiO2 (%): 30 %  Rate Set (breaths/minute): 8 breaths/min  Tidal Volume Set (mL): 550 mL  PEEP (cm H2O): 5 cmH2O  Pressure Support (cm H2O): 8 cmH2O  Oxygen Concentration (%): 30 %  Resp: 13    Day since 1/18     RSBI in 50s         Physical Examination:     General: Stated age, eyes open, denies pain  HEENT: eyes open, ET tube  Lungs: LCTAB anteriorly  CVS: RRR, S1S2  Abdomen: obese, soft  Extremities/musculoskeletal: warm, palpable pulses  Neurology: follows commands and tracks with eyes, a little more alert than yesterday  Skin: normal  Psychiatry: unable  Exam of Line sites:  No lines   Hospital Procedures   Intubation and mechanical ventilation   CXRs   Brain MRI  EEG  CT Abd/Pelvis  Head CT         Feeding/Glucose:   NPO    Blood glucose/Insulin requirement last 24 hours: none         Medications:       labetalol  150 mg Oral Q12H HO      levETIRAcetam  1,000 mg Intravenous Q12H     chlorhexidine  15 mL Mouth/Throat Q12H     insulin aspart  1-4 Units Subcutaneous Q4H     pantoprazole (PROTONIX) IV PEDS/NICU  40 mg Intravenous Daily     heparin  5,000 Units Subcutaneous Q8H     aspirin  81 mg Oral or Feeding Tube Daily     cloNIDine  0.1 mg Oral or Feeding Tube BID     [START ON 2/8/2018] cyanocobalamin  1,000 mcg Intramuscular Q30 Days     levothyroxine  50 mcg Oral or Feeding Tube QAM AC     predniSONE  5 mg Oral or Feeding Tube Daily     tacrolimus  2.5 mg Oral BID IS     meropenem  1 g Intravenous Q8H             Labs/Diagnostic studies:       ROUTINE ICU LABS (Last four results)  CMP    Recent Labs  Lab 01/20/18  0720 01/20/18  0631 01/19/18  1615 01/19/18  0445 01/18/18  1532    Canceled, Test credited  --  141 138   POTASSIUM 3.4 Canceled, Test credited 4.1 3.3* 3.5   CHLORIDE 110* Canceled, Test credited  --  107 103   CO2 23 Canceled, Test credited  --  26 26   ANIONGAP 10 Canceled, Test credited  --  8 9   * Canceled, Test credited  --  202* 264*   BUN 14 Canceled, Test credited  --  13 13   CR 0.90 Canceled, Test credited  --  0.96 0.99   GFRESTIMATED 62 Canceled, Test credited  --  57* 55*   GFRESTBLACK 75 Canceled, Test credited  --  69 67   ROGER 8.6 Canceled, Test credited  --  8.6 8.7   PROTTOTAL 7.1 Canceled, Test credited  --  7.1 7.2   ALBUMIN 2.6* Canceled, Test credited  --  2.6* 2.8*   BILITOTAL 0.4 Canceled, Test credited  --  0.4 0.7   ALKPHOS 106 Canceled, Test credited  --  92 86   AST 22 Canceled, Test credited  --  18 13   ALT 20 Canceled, Test credited  --  15 13     CBC    Recent Labs  Lab 01/21/18  0400 01/20/18  0631 01/19/18  0445 01/18/18  1532 01/18/18  1420   WBC  --  7.1 11.0 4.7 Canceled, Test credited, specimen discarded   RBC  --  3.75* 3.88 3.54* Canceled, Test credited, specimen discarded   HGB  --  11.3* 11.6* 10.9* Canceled, Test credited, specimen discarded   HCT  --  34.4* 35.7 33.1*  Canceled, Test credited, specimen discarded   MCV  --  92 92 94 Canceled, Test credited, specimen discarded   MCH  --  30.1 29.9 30.8 Canceled, Test credited, specimen discarded   MCHC  --  32.8 32.5 32.9 Canceled, Test credited, specimen discarded   RDW  --  15.1* 14.3 14.1 Canceled, Test credited, specimen discarded    150 140* 134* Canceled, Test credited, specimen discarded     INRNo lab results found in last 7 days.  Arterial Blood Gas    Recent Labs  Lab 01/19/18  0543 01/18/18  1545   PH 7.50* 7.43   PCO2 33* 39   PO2 125* 169*   HCO3 26 26   O2PER  --  Ventilator         Cultures: Urine with E. Coli    Recent Labs  Lab 01/18/18  1532 01/18/18  1445 01/18/18  1420 01/18/18  0754   CULT No growth after 3 days >100,000 colonies/mLCandida glabrataSusceptibility testing not routinely done* No growth after 3 days Canceled, Test creditedDuplicate request2 SETS OF BCS IN PROCESS     Blood culture:  Invalid input(s): BC   Urine culture:  No results for input(s): URC in the last 168 hours.            Imaging:     CXR:  clear    CT:  Atrophy of brain         Assessment and Plan:     Summary:  Gem Jade is a 72 year old female admitted on 1/18/2018 for altered mental status.  I have personally reviewed the daily labs, imaging studies, cultures and discussed the case with referring physician and consulting physicians. My assessment and plan as follows:    Neurology and Psychiatry:  1. Acute encephalopathy - polypharmacy vs. Seizure. Clinical history favors polypharmacy, EEG suggests atypical seizure,although per interpretation is unusual.  Plan  - Continue seizure medications  - Possible LP. Defer to Neurology service.   - Trial of extubation today  - Upon discharge try to limit as many of her sedating medications as possible.    Pulmonary:   1.  Acute hypoxic respiratory failure secondary to encephalopathy - stable on vent  Plan  - Pressure support trial with hope for extubation today     Cardiovascular  system:   1.Hypertensive - noted descrepancy between upper and lower extremity BP. Back on home medications.   Plan   -Continue prior to admission aspirin, clonidine, labetalol and pravastatin  - Consider as needed's if systolic greater than 180 (lower extremities)    Renal/Electrolytes:  1. CKD - Stage IV CKD >  ESRD , Hx of renal transplant. Creatinine at baseline.   2. Neurogenic bladder with chronic rico (exchanged 1/11 from last admission)  3. Electrolyte abnormality - hypernatremia and hyperchloremia and hypokalemia. Changed to hypotonic IVF.  4. Decrease urine output today. Renal function has been stable but on a small amount of IVF.  Plan  - continue tacrolimus and prednisone with tacrolimus levels by pharmacists  - renal following   - monitor function and electrolytes as needed with replacement per ICU protocols.  - generally avoid nephrotoxic agents such as NSAID, IV contrast unless specifically required  - One liter NS bolus.    ID:  1.  Chronic recurrent urinary tract infections with chronic indwelling Rico, in an immunocompromised host ;s/p recent course abx 1/11 - 1/15 , dosed meropenem in ED, UA still dirty -  CT ABD/pelvis neg for  for pyelonephritis /hydronephrosis. Procalcitonin here normal, but also normal last admission. Candida growing in urine.   2. Hx of VRE and ESBL  Plan  - FUP cultures. Stop antibiotics if final culture result turns negative.    GI/:  Feeding tube in place and getting enteral feeds.     Nutrition:   Nutrition    Musculoskeletal/Rheumatology:  No issues  - Physical therapy and occupational therapy when able    Endocrine:   1. Hyperglycemia - known DM -   2. Hypothyroidism  3. Hyperparathyroidism- calcium stable  Plan  - ICU insulin protocol, goal sugar <180  - hold off long acting insulin- continue with SS for now   - continue home levothyroxine     Heme/Onc:  1. Chronic anemia  2. Remote history of provoked PE.  Not currently on anticoagulation  Plan  - DVT  prophylaxis  - Serial blood work.  Address as necessary     ICU prophylaxis:      DVT: subcutaneous heparin     VAP: As above     Stress ulcer: PPI     Restraints needed: yes     Lines   Central Line/PICC - placed n needed: n   Arterial line - placed n needed: n   Johnson catheter.  Needed: y       Prognosis:  Guarded longer term      Family update: not by me    Billing: total time spend providing critical care was 45 min, excluding procedure time.    Radha Ruffin MD  [phone]

## 2018-01-21 NOTE — PROGRESS NOTES
[facility] ICU RESPIRATORY NOTE  Date of Admission: 1/18/18  Date of Intubation (most recent): 1/18/18  Reason for Mechanical Ventilation: airway protection 2/2 AMS  Number of Days on Mechanical Ventilation: 4   Met Criteria for Pressure Support Trial: Yes   Length of Pressure Support Trial: PS 10/5 since 1714    Significant Events Today: None     Ventilation Mode: CPAP/PS  FiO2 (%): 30 %  Rate Set (breaths/minute): 8 breaths/min  Tidal Volume Set (mL): 550 mL  PEEP (cm H2O): 5 cmH2O  Pressure Support (cm H2O): 10 cmH2O  Oxygen Concentration (%): 30 %  Resp: 19    ABG Results:    Recent Labs  Lab 01/19/18  0543 01/18/18  1545   PH 7.50* 7.43   PCO2 33* 39   PO2 125* 169*   HCO3 26 26   O2PER  --  Ventilator     ETT appearance on chest x-ray: In good position     Plan:  Will continue to monitor as ordered.    Jasmin Avila RT  1/20/2018

## 2018-01-21 NOTE — PROGRESS NOTES
Patient extubated to 40% aerosol mask without any complications. Suctioned pre and post extubation. No stridor. Sat 100%, will continue to follow.    1/21/2018  Cecil Bradford, RT

## 2018-01-21 NOTE — PROGRESS NOTES
Date of Admission: 1/18/18  Date of Intubation (most recent): 1/18/18  Reason for Mechanical Ventilation: Airway protection   Number of Days on Mechanical Ventilation: 4  Met Criteria for Pressure Support Trial:  Length of Pressure Support Trial:  Reason for Stopping Pressure Support Trial:  Reason for No Pressure Support Trial: Per MD     Significant Events Today: None this shift  Ventilation Mode: CMV/AC  FiO2 (%): 40 %  Rate Set (breaths/minute): 8 breaths/min  Tidal Volume Set (mL): 550 mL  PEEP (cm H2O): 5 cmH2O  Pressure Support (cm H2O): 10 cmH2O  Oxygen Concentration (%): 30 %  Resp: 10    Recent Labs  Lab 01/19/18  0543 01/18/18  1545   PH 7.50* 7.43   PCO2 33* 39   PO2 125* 169*   HCO3 26 26   O2PER  --  Ventilator   plan: continue full vent support tonight, assess for weaning readiness daily

## 2018-01-21 NOTE — PROGRESS NOTES
Neurology Progress Note    ____________________________________________________________    Admission Summary:    Gem Jade is a 72 year old female year old with renal transplant, DM with gastroparesis, polyneuropathy, retinopathy, and chronic UTI will indwelling catheter who presents with decreased responsiveness requiring intubation.      She has had a similar presentation and was admitted last week with negative MRI and EEG showing severe encephalopathy without seizures.    She was treated with antibiotics and supportive care with gradual improvement, without clear elucidation of the underlying pathology (possible UTI, possible preadmission hypoglycemia).     On this admission, she was intubated in the ED and an ABG post-intubation did not show CO2 retention (she was bagged on the way to the hospital by EMS).  She takes multiple medications that may be sedating including: gabapentin, trileptal, tramadol and baclofen (baclofen can produce sedation in somnolence with excess ingestion or withdrawal).    She underwent MRI/MRA which was unrevealing with exception of a small area of left parietooccipital enhancement.  EEG showing sharp GPEDs which are more consistent with encephalopathy than seizures.     No interval events. Possibly might be extubated today.     Impression:    Recurrent encephalopathy of unclear etiology, possibly toxic/metabolic/UTI, less likely seizure given multiple episodes of negative long-term monitoring     Recommendations:  1. Holding sedating medications  2. Holding Trileptal, Gabapentin, and Baclofen.  3. Continue supportive cares per ICU  4. Keppra 1000mg BID  5. Will order CSF labs including paraneoplastic panel and chronic meningitis panels tomorrow.     Please contact the Stroke/NCC Service with any questions.  Thank you.     Donovan Almanza MD  11:01 AM January 21, 2018  Vascular Neurology Fellow     Critical Care time: 45  minutes       ____________________________________________________________________        Medications:      Current Facility-Administered Medications:      0.9% sodium chloride BOLUS, 1,000 mL, Intravenous, Once, Radha Ruffin MD     labetalol (NORMODYNE) half-tab 150 mg, 150 mg, Oral, Q12H HO, Radha Ruffin MD, 150 mg at 01/21/18 0834     0.45% sodium chloride + KCl 20 mEq/L infusion, , Intravenous, Continuous, Radha Ruffin MD, Last Rate: 75 mL/hr at 01/21/18 0810     dexmedetomidine (PRECEDEX) 400 mcg in NaCl 0.9 % 100 mL infusion, 0.2-0.7 mcg/kg/hr, Intravenous, Continuous, Radha Ruffin MD, Stopped at 01/20/18 2123     levETIRAcetam (KEPPRA) intermittent infusion 1,000 mg, 1,000 mg, Intravenous, Q12H, Roel Garnett MD, 1,000 mg at 01/21/18 0316     glucose 40 % gel 15-30 g, 15-30 g, Oral, Q15 Min PRN **OR** dextrose 50 % injection 25-50 mL, 25-50 mL, Intravenous, Q15 Min PRN **OR** glucagon injection 1 mg, 1 mg, Subcutaneous, Q15 Min PRN, Santiago Frey MD     naloxone (NARCAN) injection 0.1-0.4 mg, 0.1-0.4 mg, Intravenous, Q2 Min PRN, Santiago Frey MD     chlorhexidine (PERIDEX) 0.12 % solution 15 mL, 15 mL, Mouth/Throat, Q12H, Santiago Frey MD, 15 mL at 01/21/18 0830     insulin aspart (NovoLOG) inj (RAPID ACTING), 1-4 Units, Subcutaneous, Q4H, Santiago Frey MD, 1 Units at 01/19/18 1807     pantoprazole (PROTONIX) 40 mg IV push injection, 40 mg, Intravenous, Daily, Santiago Frey MD, 40 mg at 01/21/18 0834     fentaNYL (PF) (SUBLIMAZE) injection 25-50 mcg, 25-50 mcg, Intravenous, Q1H PRN, Santiago Frey MD, 50 mcg at 01/20/18 2015     ondansetron (ZOFRAN-ODT) ODT tab 4 mg, 4 mg, Oral, Q6H PRN **OR** ondansetron (ZOFRAN) injection 4 mg, 4 mg, Intravenous, Q6H PRN, Santiago Frey MD     senna-docusate (SENOKOT-S;PERICOLACE) 8.6-50 MG per tablet 1 tablet, 1 tablet,  Oral, BID PRN **OR** senna-docusate (SENOKOT-S;PERICOLACE) 8.6-50 MG per tablet 2 tablet, 2 tablet, Oral, BID PRN, Santiago Frey MD     potassium chloride SA (K-DUR/KLOR-CON M) CR tablet 20-40 mEq, 20-40 mEq, Oral, Q2H PRN, Santiago Frey MD     potassium chloride (KLOR-CON) Packet 20-40 mEq, 20-40 mEq, Oral or Feeding Tube, Q2H PRN, Santiago Frey MD, 40 mEq at 01/19/18 0538     potassium chloride 10 mEq in 100 mL sterile water intermittent infusion (premix), 10 mEq, Intravenous, Q1H PRN, Santiago Frey MD     potassium chloride 10 mEq in 100 mL intermittent infusion with 10 mg lidocaine, 10 mEq, Intravenous, Q1H PRN, Santiago Frey MD     potassium chloride 20 mEq in 50 mL intermittent infusion, 20 mEq, Intravenous, Q1H PRN, Santiago Frey MD     magnesium sulfate 4 g in 100 mL sterile water (premade), 4 g, Intravenous, Q4H PRN, Santiago Frey MD     sodium phosphate 15 mmol in D5W intermittent infusion, 15 mmol, Intravenous, Daily PRN, Santiago Frey MD     sodium phosphate 20 mmol in D5W intermittent infusion, 20 mmol, Intravenous, Q6H PRN, Santiago Frey MD     sodium phosphate 25 mmol in D5W intermittent infusion, 25 mmol, Intravenous, Q8H PRN, Santiago Frey MD     heparin sodium PF injection 5,000 Units, 5,000 Units, Subcutaneous, Q8H, Santiago Frey MD, 5,000 Units at 01/21/18 0834     aspirin chewable tablet 81 mg, 81 mg, Oral or Feeding Tube, Daily, Santiago Frey MD, 81 mg at 01/21/18 0834     cloNIDine (CATAPRES) tablet 0.1 mg, 0.1 mg, Oral or Feeding Tube, BID, Santiago Frey MD, 0.1 mg at 01/21/18 0834     [START ON 2/8/2018] cyanocobalamin (VITAMIN B12) injection 1,000 mcg, 1,000 mcg, Intramuscular, Q30 Days, Santiago Frey MD     levothyroxine (SYNTHROID/LEVOTHROID) tablet 50 mcg, 50 mcg, Oral or Feeding Tube, [facility],  "Santiago Frey MD, 50 mcg at 01/21/18 0834     predniSONE (DELTASONE) tablet 5 mg, 5 mg, Oral or Feeding Tube, Daily, Santiago Frey MD, 5 mg at 01/21/18 0835     tacrolimus (GENERIC EQUIVALENT) capsule 2.5 mg, 2.5 mg, Oral, BID IS, Santiago Frey MD, 2.5 mg at 01/21/18 0030     labetalol (NORMODYNE/TRANDATE) injection 10 mg, 10 mg, Intravenous, Q2H PRN, Santiago Frey MD, 10 mg at 01/21/18 0306     hydrALAZINE (APRESOLINE) injection 10 mg, 10 mg, Intravenous, Q4H PRN, Santiago Frey MD, 10 mg at 01/20/18 1234     meropenem (MERREM) intermittent 1g in NS PREMIX, 1 g, Intravenous, Q8H, Santiago Frey MD, 1 g at 01/21/18 0045      Vital Signs:    /83  Pulse 80  Temp 98.6  F (37  C) (Axillary)  Resp 19  Ht 1.676 m (5' 6\")  Wt 101.5 kg (223 lb 12.3 oz)  SpO2 100%  BMI 36.12 kg/m2    Neuro:  Intubated, no sedation.  Patient follows commands, comprehension intact. Communicates with writing.  No CN abnormalities.  Moves all 4 extremities.     Labs/Studies:  CBC:       Recent Labs  Lab 01/21/18  0400 01/20/18  0631 01/19/18  0445 01/18/18  1532   WBC  --  7.1 11.0 4.7   RBC  --  3.75* 3.88 3.54*   HGB  --  11.3* 11.6* 10.9*   HCT  --  34.4* 35.7 33.1*    150 140* 134*     Basic Metabolic Panel:   Recent Labs   Lab Test  01/20/18   0720  01/20/18   0631  01/19/18   1615  01/19/18   0445   NA  143  Canceled, Test credited   --   141   POTASSIUM  3.4  Canceled, Test credited  4.1  3.3*   CHLORIDE  110*  Canceled, Test credited   --   107   CO2  23  Canceled, Test credited   --   26   BUN  14  Canceled, Test credited   --   13   CR  0.90  Canceled, Test credited   --   0.96   GLC  116*  Canceled, Test credited   --   202*   ROGER  8.6  Canceled, Test credited   --   8.6     Liver panel:  Recent Labs   Lab Test  01/20/18   0720  01/20/18   0631  01/19/18   0445  01/18/18   1532  01/18/18   1420   PROTTOTAL  7.1  Canceled, Test " credited  7.1  7.2  Canceled, Test credited   ALBUMIN  2.6*  Canceled, Test credited  2.6*  2.8*  Canceled, Test credited   BILITOTAL  0.4  Canceled, Test credited  0.4  0.7  Canceled, Test credited   ALKPHOS  106  Canceled, Test credited  92  86  Canceled, Test credited   AST  22  Canceled, Test credited  18  13  Canceled, Test credited   ALT  20  Canceled, Test credited  15  13  Canceled, Test credited     INR:  Recent Labs   Lab Test  11/06/17   1652  04/26/17   2338  04/26/17   0638  03/08/17   1243  09/02/16   0838   INR  1.09  1.03  1.06  1.01  1.09      Lipid Profile:  Recent Labs   Lab Test  12/05/16   1210  02/05/15   1250  05/16/13   1240  06/16/11   1230  11/07/10   1250   CHOL  164  106  244*  179  195   HDL  46*  43*  42*  50  57   LDL  64  18  149*  83  93   TRIG  272*  223*  262*  226*  221*   CHOLHDLRATIO   --   2.5  6.0*  3.6  3.4     A1C:   Recent Labs   Lab Test  01/12/18   0648  12/19/17   1602  11/14/17   0455  07/13/17   1105  12/05/16   1210   A1C  6.3*  6.7*  7.7*  5.6  7.4*     Troponin I:   Recent Labs   Lab Test  11/13/17   1613  04/19/17   0005  01/10/16   1408  12/18/15   1315  08/24/15   0030  03/18/14   0043  05/19/13   0625  04/11/11   1455   TROPI  <0.015  <0.015  The 99th percentile for upper reference range is 0.045 ug/L.  Troponin values in   the range of 0.045 - 0.120 ug/L may be associated with risks of adverse   clinical events.    <0.015  The 99th percentile for upper reference range is 0.045 ug/L.  Troponin values in   the range of 0.045 - 0.120 ug/L may be associated with risks of adverse   clinical events.    <0.015  The 99th percentile for upper reference range is 0.045 ug/L.  Troponin values in   the range of 0.045 - 0.120 ug/L may be associated with risks of adverse   clinical events.    <0.015  The 99th percentile for upper reference range is 0.045 ug/L.  Troponin values in   the range of 0.045 - 0.120 ug/L may be associated with risks of adverse   clinical events.     0.012  <0.012  <0.012         Imaging:  Relevant findings as per the Impression above.

## 2018-01-21 NOTE — PROCEDURES
LONG-TERM ELECTRONENCEPHALOGRAM WITH VIDEO      ORDERING PHYSICIAN:  Roel Garnett MD      [facility] EEG #[id number], Day One    DATE AND DURATION: Study started at 10:22:44 a.m. on 01/19/2018 and ended at 11:08:45 AM on 01/20/2018.      HISTORY:  Day 1 of video EEG monitoring, patient Gem Jade, a 72-year-old with recurrent bouts of significant encephalopathy.  She suffers from a variety of medical problems including diabetes, renal transplant, chronic UTI with indwelling catheter.  She has had repeated hospitalizations for encephalopathy which has never been fully explained.  She is not currently being treated with anti-seizure medications but did receive treatment during this study.      TECHNICAL SUMMARY:  EEG was recorded from scalp electrodes placed according to the 10-20 international system.  Additional electrodes were utilized for referencing, artifact detection, and recording from other cerebral regions.  Video was continuously recorded.  Video was reviewed for clinical correlation and to assess with the EEG interpretation.      FINDINGS:  At EEG onset, moderate diffuse delta noted.  Generalized periodic sharp waves were noted, usually occurring at 1 Hz or less.  This activity was quite persistent.  Generalized sharp waves were synchronous.  Video was reviewed and there were no clear clinical correlations to the generalized sharp waves.      Because of some suspicions that this was an atypical type of nonconvulsive status, midazolam 7 mg was injected with the injection finished at about 1430.  This resulted in complete cessation of the generalized periodic discharges together with some signs of sedation.  Attenuated alpha pattern was seen with occasional desynchronizations.  She was examined by clinical team at that time and the impression was that her mental status had improved somewhat.  She was now tracking and what appeared to be trying to communicate more aggressively than previously.  Generalized  periodic sharp waves ceased completely for about an hour following injection of the midazolam.  Generalized periodic sharp waves returned at around 1340.  Levetiracetam 1000 mg was infused and at approximately 1700 the general periodic waves stopped completely again.  They ceased for about 30 minutes.  The patient coughed at this point with a return of generalized periodic sharp waves.      Generalized periodic sharp waves at a rate of 1 Hz for less continued off and on.  Subsequently, the levetiracetam maintenance was initiated.  The persistence of the periodic sharp wave activity attenuated around midnight and essentially stopped at around 0300.  At about 0600 on 01/20 a 6 Hz posterior dominant rhythm was seen with moderate amounts of diffuse theta and delta.  Rate of eye blink increased.  This improvement persisted throughout the morning.  There was further improvement in late morning, early afternoon, at which point a fairly sustained 7 Hz posterior dominant rhythm was noted with a reduction in the amounts of diffuse slowing.  She was again evaluated by clinical staff who reported that she was more alert and was attempting to communicate by writing.  This was a marked improvement from the beginning of the study.  She continued intubated.      Electrographic seizures in the classical sense of evolving rhythmic discharges with abrupt onset and offset were not seen in this study.  Automatic clinical activity was not noted either.      IMPRESSION:  Abnormal.  There was evidence of a moderate to severe diffuse encephalopathy at the beginning of the study which improved dramatically to a mild encephalopathy by study's end.      Persistent generalized periodic sharp waves responded acutely to midazolam injection.  They responded transiently to levetiracetam injection.  Periodic sharp activity returned for about 8 hours, but then gradually dissipated and stopped completely for the last 8 hours of the study, following  initiation of maintenance levetiracetam therapy.     Periodic sharp activity at the rate noted in the study is not a typical pattern for nonconvulsive status epilepticus.  However, administration of both acute maintenance anticonvulsant medication resulted in improvement of this EEG pattern in tandem with clinical improvement. There remains a possibility that the patient's encephalopathy was resolving on its own and that the apparent response to anti-seizure medications is a coincidence.  Nonetheless, the majority of the circumstantial evidence favors the interpretation the the patient was in an unusual form of nonconvulsive status epilepticus that responded to anticonvulsant treatment.      Revised DOS 2018 chikis         FRANKY ACOSTA MD             D: 2018 22:53   T: 2018 06:07   MT: CC      Name:     MARYJANE SHAVER   MRN:      [id number]        Account:        [id number]   :      1945           Procedure Date: 2018      Document: [id number]       cc: Roel Garnett MD

## 2018-01-22 ENCOUNTER — APPOINTMENT (OUTPATIENT)
Dept: SPEECH THERAPY | Facility: CLINIC | Age: 73
DRG: 100 | End: 2018-01-22
Payer: MEDICARE

## 2018-01-22 ENCOUNTER — TELEPHONE (OUTPATIENT)
Dept: FAMILY MEDICINE | Facility: CLINIC | Age: 73
End: 2018-01-22

## 2018-01-22 LAB
ANION GAP SERPL CALCULATED.3IONS-SCNC: 9 MMOL/L (ref 3–14)
BUN SERPL-MCNC: 15 MG/DL (ref 7–30)
CALCIUM SERPL-MCNC: 7.9 MG/DL (ref 8.5–10.1)
CHLORIDE SERPL-SCNC: 111 MMOL/L (ref 94–109)
CO2 SERPL-SCNC: 21 MMOL/L (ref 20–32)
CREAT SERPL-MCNC: 0.87 MG/DL (ref 0.52–1.04)
ERYTHROCYTE [DISTWIDTH] IN BLOOD BY AUTOMATED COUNT: 14.9 % (ref 10–15)
GFR SERPL CREATININE-BSD FRML MDRD: 64 ML/MIN/1.7M2
GLUCOSE BLDC GLUCOMTR-MCNC: 102 MG/DL (ref 70–99)
GLUCOSE BLDC GLUCOMTR-MCNC: 102 MG/DL (ref 70–99)
GLUCOSE BLDC GLUCOMTR-MCNC: 114 MG/DL (ref 70–99)
GLUCOSE BLDC GLUCOMTR-MCNC: 89 MG/DL (ref 70–99)
GLUCOSE BLDC GLUCOMTR-MCNC: 99 MG/DL (ref 70–99)
GLUCOSE SERPL-MCNC: 81 MG/DL (ref 70–99)
HCT VFR BLD AUTO: 30.9 % (ref 35–47)
HGB BLD-MCNC: 9.9 G/DL (ref 11.7–15.7)
MAGNESIUM SERPL-MCNC: 1.5 MG/DL (ref 1.6–2.3)
MAGNESIUM SERPL-MCNC: 2.6 MG/DL (ref 1.6–2.3)
MCH RBC QN AUTO: 29.7 PG (ref 26.5–33)
MCHC RBC AUTO-ENTMCNC: 32 G/DL (ref 31.5–36.5)
MCV RBC AUTO: 93 FL (ref 78–100)
PHOSPHATE SERPL-MCNC: 2.6 MG/DL (ref 2.5–4.5)
PLATELET # BLD AUTO: 138 10E9/L (ref 150–450)
POTASSIUM SERPL-SCNC: 4.1 MMOL/L (ref 3.4–5.3)
RBC # BLD AUTO: 3.33 10E12/L (ref 3.8–5.2)
SODIUM SERPL-SCNC: 141 MMOL/L (ref 133–144)
TACROLIMUS BLD-MCNC: 7.6 UG/L (ref 5–15)
TME LAST DOSE: NORMAL H
WBC # BLD AUTO: 5.1 10E9/L (ref 4–11)

## 2018-01-22 PROCEDURE — 99222 1ST HOSP IP/OBS MODERATE 55: CPT | Performed by: PHYSICIAN ASSISTANT

## 2018-01-22 PROCEDURE — 36415 COLL VENOUS BLD VENIPUNCTURE: CPT | Performed by: INTERNAL MEDICINE

## 2018-01-22 PROCEDURE — 25000128 H RX IP 250 OP 636: Performed by: INTERNAL MEDICINE

## 2018-01-22 PROCEDURE — 25000132 ZZH RX MED GY IP 250 OP 250 PS 637: Mod: GY | Performed by: INTERNAL MEDICINE

## 2018-01-22 PROCEDURE — 25000132 ZZH RX MED GY IP 250 OP 250 PS 637: Mod: GY | Performed by: PSYCHIATRY & NEUROLOGY

## 2018-01-22 PROCEDURE — 83735 ASSAY OF MAGNESIUM: CPT | Performed by: INTERNAL MEDICINE

## 2018-01-22 PROCEDURE — 25000128 H RX IP 250 OP 636: Performed by: PSYCHIATRY & NEUROLOGY

## 2018-01-22 PROCEDURE — A9270 NON-COVERED ITEM OR SERVICE: HCPCS | Mod: GY | Performed by: INTERNAL MEDICINE

## 2018-01-22 PROCEDURE — 40000225 ZZH STATISTIC SLP WARD VISIT: Performed by: SPEECH-LANGUAGE PATHOLOGIST

## 2018-01-22 PROCEDURE — 25000131 ZZH RX MED GY IP 250 OP 636 PS 637: Mod: GY | Performed by: INTERNAL MEDICINE

## 2018-01-22 PROCEDURE — 80048 BASIC METABOLIC PNL TOTAL CA: CPT | Performed by: INTERNAL MEDICINE

## 2018-01-22 PROCEDURE — A9270 NON-COVERED ITEM OR SERVICE: HCPCS | Mod: GY | Performed by: PSYCHIATRY & NEUROLOGY

## 2018-01-22 PROCEDURE — 99207 ZZC CONSULT E&M CHANGED TO INITIAL LEVEL: CPT | Performed by: PHYSICIAN ASSISTANT

## 2018-01-22 PROCEDURE — 85027 COMPLETE CBC AUTOMATED: CPT | Performed by: INTERNAL MEDICINE

## 2018-01-22 PROCEDURE — 36416 COLLJ CAPILLARY BLOOD SPEC: CPT | Performed by: INTERNAL MEDICINE

## 2018-01-22 PROCEDURE — 00000146 ZZHCL STATISTIC GLUCOSE BY METER IP

## 2018-01-22 PROCEDURE — 92610 EVALUATE SWALLOWING FUNCTION: CPT | Mod: GN | Performed by: SPEECH-LANGUAGE PATHOLOGIST

## 2018-01-22 PROCEDURE — 25000125 ZZHC RX 250: Performed by: INTERNAL MEDICINE

## 2018-01-22 PROCEDURE — 12000000 ZZH R&B MED SURG/OB

## 2018-01-22 PROCEDURE — 99232 SBSQ HOSP IP/OBS MODERATE 35: CPT | Performed by: PSYCHIATRY & NEUROLOGY

## 2018-01-22 PROCEDURE — 84100 ASSAY OF PHOSPHORUS: CPT | Performed by: INTERNAL MEDICINE

## 2018-01-22 RX ORDER — LEVETIRACETAM 500 MG/1
1000 TABLET ORAL 2 TIMES DAILY
Status: DISCONTINUED | OUTPATIENT
Start: 2018-01-22 | End: 2018-01-25 | Stop reason: HOSPADM

## 2018-01-22 RX ORDER — TACROLIMUS 1 MG/1
2 CAPSULE ORAL EVERY 12 HOURS
Status: DISCONTINUED | OUTPATIENT
Start: 2018-01-22 | End: 2018-01-25 | Stop reason: HOSPADM

## 2018-01-22 RX ORDER — PANTOPRAZOLE SODIUM 40 MG/1
40 TABLET, DELAYED RELEASE ORAL EVERY MORNING
Status: DISCONTINUED | OUTPATIENT
Start: 2018-01-23 | End: 2018-01-25 | Stop reason: HOSPADM

## 2018-01-22 RX ORDER — LEVETIRACETAM 100 MG/ML
1000 SOLUTION ORAL 2 TIMES DAILY
Status: DISCONTINUED | OUTPATIENT
Start: 2018-01-22 | End: 2018-01-22 | Stop reason: ALTCHOICE

## 2018-01-22 RX ADMIN — ASPIRIN 81 MG 81 MG: 81 TABLET ORAL at 08:21

## 2018-01-22 RX ADMIN — MEROPENEM AND SODIUM CHLORIDE 1 G: 1 INJECTION, SOLUTION INTRAVENOUS at 06:54

## 2018-01-22 RX ADMIN — TACROLIMUS 2 MG: 1 CAPSULE ORAL at 17:47

## 2018-01-22 RX ADMIN — Medication 150 MG: at 20:06

## 2018-01-22 RX ADMIN — HEPARIN SODIUM 5000 UNITS: 5000 INJECTION, SOLUTION INTRAVENOUS; SUBCUTANEOUS at 00:40

## 2018-01-22 RX ADMIN — CLONIDINE HYDROCHLORIDE 0.1 MG: 0.1 TABLET ORAL at 08:21

## 2018-01-22 RX ADMIN — PREDNISONE 5 MG: 5 TABLET ORAL at 08:21

## 2018-01-22 RX ADMIN — LEVOTHYROXINE SODIUM 50 MCG: 50 TABLET ORAL at 08:21

## 2018-01-22 RX ADMIN — POTASSIUM CHLORIDE AND SODIUM CHLORIDE: 450; 150 INJECTION, SOLUTION INTRAVENOUS at 14:15

## 2018-01-22 RX ADMIN — POTASSIUM CHLORIDE AND SODIUM CHLORIDE: 450; 150 INJECTION, SOLUTION INTRAVENOUS at 00:42

## 2018-01-22 RX ADMIN — PANTOPRAZOLE SODIUM 40 MG: 40 INJECTION, POWDER, FOR SOLUTION INTRAVENOUS at 08:22

## 2018-01-22 RX ADMIN — LEVETIRACETAM 1000 MG: 500 TABLET, FILM COATED ORAL at 20:06

## 2018-01-22 RX ADMIN — HEPARIN SODIUM 5000 UNITS: 5000 INJECTION, SOLUTION INTRAVENOUS; SUBCUTANEOUS at 16:17

## 2018-01-22 RX ADMIN — HEPARIN SODIUM 5000 UNITS: 5000 INJECTION, SOLUTION INTRAVENOUS; SUBCUTANEOUS at 08:22

## 2018-01-22 RX ADMIN — LEVETIRACETAM 1000 MG: 500 TABLET, FILM COATED ORAL at 10:26

## 2018-01-22 RX ADMIN — CLONIDINE HYDROCHLORIDE 0.1 MG: 0.1 TABLET ORAL at 20:06

## 2018-01-22 RX ADMIN — LEVETIRACETAM 1000 MG: 10 INJECTION INTRAVENOUS at 03:31

## 2018-01-22 RX ADMIN — MAGNESIUM SULFATE IN WATER 4 G: 40 INJECTION, SOLUTION INTRAVENOUS at 16:43

## 2018-01-22 RX ADMIN — Medication 150 MG: at 08:21

## 2018-01-22 ASSESSMENT — VISUAL ACUITY
OU: NORMAL ACUITY;BASELINE
OU: BASELINE

## 2018-01-22 ASSESSMENT — ACTIVITIES OF DAILY LIVING (ADL)
ADLS_ACUITY_SCORE: 35
ADLS_ACUITY_SCORE: 31

## 2018-01-22 NOTE — PLAN OF CARE
Problem: Patient Care Overview  Goal: Plan of Care/Patient Progress Review  Outcome: Improving  Patient alert and oriented x 4, vital signs stable. Transferred to station 73, report called to RN, patient had no belongings. Patient spouse called and aware of transfer.

## 2018-01-22 NOTE — PROGRESS NOTES
01/22/18 1509   General Information   Onset Date 01/18/18   Start of Care Date 01/22/18   Referring Physician Dr. Youssef   Patient Profile Review/OT: Additional Occupational Profile Info See Profile for full history and prior level of function   Swallowing Evaluation Bedside swallow evaluation   Behaviorial Observations Alert;Confused   Mode of current nutrition NPO   Respiratory Status Extubated on (date)  (1/21/18)   Comments Ms. Gem Jade is a 72 yof with a history of neurogenic bladder with chronic indwelling rico, recurrent UTIs, and kidney transplant (1999) maintained on tacrolimus and 5 mg prednisone who was recently admitted to the ICU and [facility] 1/11 through 1/15in the setting of possible status epilepticus (NCSE) requiring intubation. Patient seen by this department on multiple admissions.    Clinical Swallow Evaluation   Oral Musculature anomalies present   Structural Abnormalities none present   Dentition present and adequate   Mucosal Quality adequate   Mandibular Strength and Mobility intact   Oral Labial Strength and Mobility impaired retraction;impaired pursing  (Left sided weakness. )   Lingual Strength and Mobility impaired left lateral movement;impaired right lateral movement;impaired anterior elevation   Velar Elevation impaired   Buccal Strength and Mobility impaired   Laryngeal Function Cough;Throat clear;Swallow;Voicing initiated;Dry swallow palpated   Oral Musculature Comments Mild to moderate deficits.   Additional Documentation Yes   Swallow Eval   Feeding Assistance dependent   Clinical Swallow Eval: Thin Liquid Texture Trial   Mode of Presentation, Thin Liquids spoon;cup;self-fed;fed by clinician   Volume of Liquid or Food Presented 8 swallows   Oral Phase of Swallow Premature pharyngeal entry   Pharyngeal Phase of Swallow impaired;reduction in laryngeal movement;repeated swallows   Clinical Swallow Eval: Nectar Thick Liquid Texture Trial   Mode of Presentation, Nectar  spoon;fed by clinician   Volume of Nectar Presented 4 teaspoons   Oral Phase, Nectar WFL   Pharyngeal Phase, Nectar impaired;reduction in laryngeal movement;repeated swallows   Clinical Swallow Eval: Puree Solid Texture Trial   Mode of Presentation, Puree spoon;fed by clinician   Volume of Puree Presented 4 teaspoons   Oral Phase, Puree Poor AP movement;Premature pharyngeal entry   Pharyngeal Phase, Puree impaired;repeated swallows;reduction in laryngeal movement   Clinical Swallow Eval: Semisolid Texture Trial   Mode of Presentation, Semisolid spoon;fed by clinician   Volume of Semisolid Food Presented 1 bite   Oral Phase, Semisolid Poor AP movement;Residue in oral cavity;Premature pharyngeal entry   Oral Residue, Semisolid mid posterior tongue   Pharyngeal Phase, Semisolid impaired;reduction in laryngeal movement;repeated swallows;throat clearing;no awareness of problems   Diagnostic Statement talking while swallowing.    Swallow Compensations   Swallow Compensations Alternate viscosity of consistencies;Pacing;Reduce amounts;Multiple swallow   Results Suspect silent aspiration;Oral difficulties only   General Therapy Interventions   Planned Therapy Interventions Dysphagia Treatment   Dysphagia treatment Instruction of safe swallow strategies;Modified diet education   Swallow Eval: Clinical Impressions   Skilled Criteria for Therapy Intervention Skilled criteria met.  Treatment indicated.   Functional Assessment Scale (FAS) 3   Treatment Diagnosis Moderate oral and pharyngeal dysphagia   Diet texture recommendations Dysphagia diet level 1;Thin liquids   Recommended Feeding/Eating Techniques alternate between small bites and sips of food/liquid;check mouth frequently for oral residue/pocketing;hard swallow w/ each bite or sip;no straws;small sips/bites;maintain upright posture during/after eating for 30 mins   Therapy Frequency daily   Predicted Duration of Therapy Intervention (days/wks) 1 week   Anticipated  Discharge Disposition inpatient rehabilitation facility   Risks and Benefits of Treatment have been explained. Yes   Patient, family and/or staff in agreement with Plan of Care Yes   Clinical Impression Comments Patient presents with moderate oral and pharyngeal dysphagia secondary to post intubation x3 days. Deficits include: reduced bolus control with premature entry of thin liquids via thge cup but no overt Sx of aspiration. Tolerated nectar thick liquids by the spoon without Sx of aspiration. She demonstrated reduced bolus coordiation and AP propulsion of the bolus, mild delay but no overt Sx of aspiration. Mastication was reduced for semi-solids and mild oral residue. Talking while masticating increasing her risk for aspiration. Recommend: 1. Dysphagia Diet level 1 with thin liquids, 2. Upright, supervision/assistance, no straws, small single sips, alternate liquids/solids. if coughing noted with thin change to nectar. Crush medications and place in pudding.    Total Evaluation Time   Total Evaluation Time (Minutes) 20

## 2018-01-22 NOTE — PROCEDURES
TYPE OF STUDY:  LONG-TERM ELECTROENCEPHALOGRAM WITH VIDEO      ORDERING PHYSICIAN:  Dr. Garnett      [facility] EEG # [id number]/c, Day 2      STUDY DATE AND DURATION: Study started 01/20/2018, at 11:10:10 AM and ended 01/21/2018, at 12:18:03 PM.      HISTORY:  Gem Jade is a 72-year-old with periods of severe encephalopathy.  During previous EEGs, during this admission, she had generalized periodic sharp waves.  These appear to respond to anticonvulsant medications with some improvement of mental status.  She is undergoing continuing monitoring to evaluate seizures and exclude ongoing seizures, because seizures are subclinical.  She was being treated with levetiracetam during this study.      TECHNICAL SUMMARY:  EEG was recorded from scalp electrodes placed according to the 10-20 international system.  Additional electrodes were utilized for referencing, artifact detection, and recording from other cerebral regions.  Video was continuously recorded.  Video was reviewed for clinical correlation and to assist with EEG interpretation.      FINDINGS:  During the initial portion of the study, a 7 Hz rhythm was seen best developed in the posterior regions while patient was awake.  During periods when patient was quieter with eyes closed, more irregular slowing was seen without a defined posterior dominant rhythm.  Vertex waves or sleep spindles were not noted.  The patient was awake for much of the night.  In the morning hours, background further improved with a 7 to as high as a 9 Hz posterior dominant rhythm being seen.  There was little excessive theta when patient was fully awake.  EEG was reactive.        OTHER INTERICTAL ABNORMALITIES:  No generalized periodic discharges.  No epileptiform discharges.      ICTAL:  No electrographic seizures.      IMPRESSION:  EEG continues improving.  Evidence of mild to moderate encephalopathy was seen at the beginning of the study, but there were stretches of normal background as the  study ended.  The generalized periodic discharges seen in yesterday's study were not seen in today's study.      SUMMARY OF 2 DAYS OF VIDEO-EEG MONITORING:  On presentation, patient was encephalopathic.  Generalized periodic sharp waves were seen at the rate of 1 to 1.3 Hz or less.  These stopped following midazolam 7 mg.  They recurred about an hour later.  Periodic discharges again stopped about 30 minutes following the levetiracetam load.  They recurred several hours later as patient awoke from apparent sedation.  Periodic discharges continued for the next 6-7 hours and then ceased completely.  A posterior dominant rhythm gradually emerged first with evidence of mild to moderate diffuse encephalopathy and then towards the end of the study with just a mild encephalopathy.  Periodic discharges were not seen during the recording, about 6 hours after levetiracetam was infused.      EEG findings at the beginning of study are not a typical pattern for nonconvulsive status epilepticus.  However, the response of the pattern to anticonvulsant treatment and subsequent clinical response suggests that, in this particular patient, 1 to 1.5 Hz generalized periodic sharp activity is a form of atypical nonconvulsive status epilepticus.  Longer term control of patient's encephalopathic patterns with chronic anticonvulsant treatment and response to anticonvulsants, if this pattern recurs in the future, would help confirm this impression.         FRANKY ACOSTA MD             D: 2018   T: 2018   MT: FABI      Name:     MARYJANE SHAVER   MRN:      [id number]        Account:        [id number]   :      1945           Procedure Date: 2018      Document: [id number]

## 2018-01-22 NOTE — PLAN OF CARE
Problem: Patient Care Overview  Goal: Plan of Care/Patient Progress Review  Outcome: Improving  Pt extubated this AM. Currently on room air. LS diminished, improving. Dry cough, non productive. Cms/neuros intact except baseline bi. LE weakness (pt reports w/c bound at home). VSS. Denies pain. LAURA- 1L bolus and albumin given recently- awaiting results. NPO. Pt's  updated extensively on POC via phone and at bedside, supportive.   Brittani Lopez RN

## 2018-01-22 NOTE — PROGRESS NOTES
Neuroscience Intensive Care Progress Note    2018    Gem Jade is a 72 year old year old woman h/o neurogenic bladder, recurrent UTI, renal transplant () on immune suppression, peripheral neuropathy who was recently admitted 2018 with suspected status epilepticus. She was found to be hypoglycemic at that time and also had UTI from which she recovered and EEG showed triphasic waves at that time. She got readmitted for encephalopathy. EEG showed generalized atypical periodic sharp waves treated with AED and she improved. Currently at baseline.     Plan:  - Continue Keppra 1000 BID  - Outpatient Epilepsy follow up in 1 month  - If patient continues to do well, ok to discharge from neurologic stand point  - Will sing off. Please call with questions.     24 hour events:  Neurologically improved    24 Hour Vital Signs Summary:  Temperatures:  Current - Temp: 98.5  F (36.9  C); Max - Temp  Av.4  F (36.9  C)  Min: 98.1  F (36.7  C)  Max: 98.6  F (37  C)  Respiration range: Resp  Av.9  Min: 0  Max: 19  Pulse range: No Data Recorded  Blood pressure range: Systolic (24hrs), Av , Min:122 , Max:178   ; Diastolic (24hrs), Av, Min:59, Max:83    Pulse oximetry range: SpO2  Av.1 %  Min: 94 %  Max: 99 %    Ventilator Settings  Ventilation Mode: CPAP/PS  FiO2 (%): 40 %  Rate Set (breaths/minute): 8 breaths/min  Tidal Volume Set (mL): 550 mL  PEEP (cm H2O): 5 cmH2O  Pressure Support (cm H2O): 8 cmH2O  Oxygen Concentration (%): 30 %  Resp: 17      Intake/Output Summary (Last 24 hours) at 18 1541  Last data filed at 18 1400   Gross per 24 hour   Intake             2275 ml   Output             1660 ml   Net              615 ml       BP - Mean:  [] 90    Current Medications:    levETIRAcetam  1,000 mg Oral BID     [START ON 2018] pantoprazole  40 mg Oral QAM     tacrolimus  2 mg Oral Q12H     labetalol  150 mg Oral Q12H HO     insulin aspart  1-4 Units Subcutaneous Q4H  "    heparin  5,000 Units Subcutaneous Q8H     aspirin  81 mg Oral or Feeding Tube Daily     cloNIDine  0.1 mg Oral or Feeding Tube BID     [START ON 2/8/2018] cyanocobalamin  1,000 mcg Intramuscular Q30 Days     levothyroxine  50 mcg Oral or Feeding Tube QAM AC     predniSONE  5 mg Oral or Feeding Tube Daily       PRN Medications:  glucose **OR** dextrose **OR** glucagon, naloxone, ondansetron **OR** ondansetron, senna-docusate **OR** senna-docusate, potassium chloride, potassium chloride, potassium chloride, potassium chloride with lidocaine, potassium chloride, magnesium sulfate, sodium phosphate, sodium phosphate, sodium phosphate, labetalol, hydrALAZINE    Infusions:    0.45% sodium chloride + KCl 20 mEq/L 75 mL/hr at 01/22/18 1415       Allergies   Allergen Reactions     Bactrim Ds [Sulfamethoxazole W/Trimethoprim]      Creatinine level increased     Atorvastatin Calcium      myalgias, muscle aches     Codeine Nausea and Vomiting     Darvocet [Propoxyphene N-Apap] Nausea and Vomiting     Hydromorphone      Levofloxacin Other (See Comments) and Diarrhea     hallucinations     Morphine      Nausea and vomiting     Niaspan [Niacin] GI Disturbance     Flushing even at low dose, GI upset     Percocet [Oxycodone-Acetaminophen]      Vomiting after taking med couple of times     Vicodin [Hydrocodone-Acetaminophen] Nausea and Vomiting       Physical Examination:  /67  Pulse 80  Temp 98.5  F (36.9  C) (Oral)  Resp 17  Ht 1.676 m (5' 6\")  Wt 101.5 kg (223 lb 12.3 oz)  SpO2 98%  BMI 36.12 kg/m2  Alert, oriented to time place and person, Follows simple commands, not aphasic or dysarthric, PERRLA, EOMI, Symmetric face. BL UE strength equal 4/5, BL LE 3+/5 chronic weakness due to neuropathy.       Labs/Studies:  Recent Labs   Lab Test  01/22/18   0458  01/21/18   1217  01/21/18   0400  01/20/18   0720  01/20/18   0631   NA  141  141   --   143  Canceled, Test credited   POTASSIUM  4.1  3.7   --   3.4  Canceled, " Test credited   CHLORIDE  111*  107   --   110*  Canceled, Test credited   CO2  21  23   --   23  Canceled, Test credited   ANIONGAP  9  11   --   10  Canceled, Test credited   GLC  81  99   --   116*  Canceled, Test credited   BUN  15  16   --   14  Canceled, Test credited   CR  0.87  1.04   --   0.90  Canceled, Test credited   ROGER  7.9*  8.2*   --   8.6  Canceled, Test credited   WBC  5.1  7.0   --    --   7.1   RBC  3.33*  3.62*   --    --   3.75*   HGB  9.9*  10.9*   --    --   11.3*   PLT  138*  149*  157   --   150       Recent Labs   Lab Test  11/06/17   1652  04/26/17   2338  04/26/17   0638   08/07/16   0750  08/06/16   1858   INR  1.09  1.03  1.06   < >  1.12  1.09   PTT  35   --    --    --   38*  41*    < > = values in this interval not displayed.           Recent Labs  Lab 01/21/18  0955 01/21/18  0940 01/19/18  0543 01/18/18  1545   PH 7.40  --  7.50* 7.43   PCO2 35  --  33* 39   PO2 112*  --  125* 169*   HCO3 21  --  26 26   O2PER 30% Canceled, Test credited  --  Ventilator           Imaging:  Reviewed    Plan discussed with Dr. Kei Quintero MD  Fellow   3014

## 2018-01-22 NOTE — PHARMACY-CONSULT NOTE
Tacrolimus Dosing    Patient admitted on 1/18/2017 and per her , she was on Tacrolimus 2.5mg twice daily.      A trough level was drawn errantly 30 minutes after a dose was given on 1/20 instead of before the dose. Level was elevated at 33.1.  Due to possible side effects and very high level, further doses were held and stat trough level ordered.  14 hours post-dose level from 1/21 = 7.6.  Goal 12 hours trough level = 3-5.  We will restart her Tacrolimus at 2mg twice a day and recheck a trough in 2-3 days.  Patient's Transplant coordinator, Porsha Silva ([phone]) was notified.    Clau Dowell, VivianD

## 2018-01-22 NOTE — CONSULTS
[facility]    Hospitalist Consultation    Date of Admission:  1/18/2018  Date of Consult (When I saw the patient): 1/22/2018    Assessment & Plan   Gem Jade is a 72 year old female with a past medical history of neurogenic bladder with indwelling rico cath, recurrent UTI including ESBL klebsiella, ESRD s/p renal transplant in 1999 with chronic immunosuppressive therapy, HTN, hyperlipidemia, and recurrent encephalopathy of unclear etiology with recent hospital admissions in 12/2017 and 1/2018 due to altered mental status presumed to be infectious related to recurrent UTI who was admitted to the intensivist service on 1/18/18 after being found unresponsive at home.  She was intubated due to inability to protect airway, neuroICU consulted and empirically treated with versed/keppra trial with clinical improvement. Patient has since returned to baseline and extubated 1/21. Hospitalist service was contacted to assume medical care.    Recurrent encephalopathy  Etiology remains unclear, toxic/metabolic versus non-convulsive status epilepticus. BG preadmission 306, UA abnormal with negative culture. Polypharmacy potentially playing role as pt is on baclofen, gabapentin, trileptal, and tramadol PTA. Patient with recent admission 1/11-1/15 for similar presentation without clear etiology, improved with supportive cares and treatment of underlying UTI. NeuroICU following, MRI/MRA 1/19 with abnormal enhancement at posterior parietal & occipital lobes. EEG without clear epileptiform pattern, however given clinical history was started on empiric Versed / Keppra trial with patient becoming more alert following Versed.   - Holding sedating medications, including PTA gabapentin, baclofen, tramadol, trileptal  - NeuroICU following, EEG monitoring d/c'd. Personally discussed case with Dr. Choudhury who recommends continued Keppra at 1000mg BID and monitoring, LP not required at this time as patient is clinically  improved  - Will need repeat MRI with contrast in 4 weeks and neurologic follow-up  - May consider     Neurogenic bladder with indwelling rico  History of recurrent UTIs  Patient with hx of indwelling rico. 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  With recent hospitalizations 12/2017 due to sepsis due to UTI and 1/2018 with UTI. Most recent UCx with pan-sensitive E.coli treated with ceftriaxone and transitioned to PO Keflex at discharge on 1/15 scheduled to complete today. Admission UA abnormal, UCx with Candida glabrata. Pt has been afebrile without leukocytosis and negative procalcitonin since admission.  - D/C meropenem  - Follow fever curve     Acute hypoxic respiratory failure, secondary to acute encephalopathy, resolved   Patient is not requiring oxygen at baseline according to note review. Noted to be hypoxic to 85% prehospital. Required intubation from 1/18-1/21 for airway protection. Currently saturating well on RA after mental status improved.       Chronic renal failure, stage 3-4, status post renal transplant in 1999, with chronic immunosuppression, as well as hyperparathyroidism  Initial creatinine is 0.99.  The patient has been followed by nephrology in the past.  She is maintained on prednisone and tacrolimus.  Most recent tacrolimus level 7.6 on 1/21, admission value 5.6.  - Continue tacrolimus and steroids      Hypertension  Hyperlipidemia  - Continue prior to admission aspirin 81 mg daily, clonidine, labetalol and statin  - prn IV labetalol for SBP >180     Diabetes mellitus type 2, with a history of gastroparesis, retinopathy, polyneuropathy  Most recent A1c 6.3% on 1/12/18. On recent admission with suspected preadmission hypoglycemia. Lantus insulin held without need for additional correction, resumed at discharge. PTA medications held once again, values have been well controlled past 24h without requiring correction.    Recent Labs  Lab 01/22/18  0751  01/22/18  0458 01/21/18  2031 01/21/18  1653 01/21/18  1226 01/21/18  1217 01/21/18  0822 01/21/18  0356  01/20/18  0720  01/20/18  0631  01/19/18  0445  01/18/18  1532   GLC  --  81  --   --   --  99  --   --   --  116*  --  Canceled, Test credited  --  202*  --  264*   BGM 89  --  82 98 106*  --  80 88  < >  --   < >  --   < >  --   < >  --    < > = values in this interval not displayed.    - Holding PTA Lantus 24u qHS, Novolog 10u BID with sliding scale  - SSI  - Mod CHO diet when appropriate      Anemia of chronic disease, baseline 10-12  Admission Hgb 10.9, stable.  - Monitor      Physical deconditioning  Secondary to physical debility, polyneuropathy, and history of Charcot foot.  - PT consult and SW for dispo planning, may benefit from TCU stay at discharge      History of bilateral pulmonary emboli, postop after knee replacement in the past  This is elucidated upon chart review, listed in past medical history.      Hypothyroidism  Patient maintained levothyroxine prior to admission. TSH on admission 1.31.  - Continue prior to admission levothyroxine    DVT Prophylaxis: Pneumatic Compression Devices  Code Status: Full Code    Disposition: Will transfer from ICU to station 73 for ongoing workup.    Rich Clancy PA-C    Reason for Consult   Physician requesting consultation: Augustin  Reason for consult: assume medical management    Primary Care Physician   Marivel Barcenas NP    History of Present Illness   Gem Jade is a 72 year old female with a past medical history of neurogenic bladder with indwelling rico cath, recurrent UTI including ESBL klebsiella, ESRD s/p renal transplant in 1999 with chronic immunosuppressive therapy, HTN, hyperlipidemia, and recurrent encephalopathy of unclear etiology with recent hospital admissions in 12/2017 and 1/2018 due to altered mental status presumed to be infectious related to recurrent UTI who was admitted to the intensivist service on 1/18/18.    With  recent admission 1/11-1/15 for similar presentation, found to have UTI and possible prehospital hypoglycemia. Initially remained altered with treatment; neurologic evaluation at that time included EEG indicating encephalopathy and negative MRI, AED not recommended. Gradually improved with tx of UTI, at time of discharge was at baseline. Pt returned to ED 1/18 after being found unresponsive at home, requiring intubation for airway protection and admission to ICU. Patient has been managed with supportive care and neurologic evaluation with trial of Versed and Keppra, demonstrating clinical improvement despite EEG findings atypical of seizure activity. Patient improved with trial and was able to extubate and maintain airway on 1/21, hospitalist service was consulted to assume medical care.    Patient is presently evaluated in hoptal bed. She reports she is overall feeling well but frustrated she continues to have episodes requiring admission and ICU stay. She recalls being evaluated by neurology this morning, is aware of plan to continue monitoring and LP decision TBD. Has intermittent low-grade cough which she attributes to recent intubation, denies sob, cp, abd discomfort.     Past Medical History    1. DMII with gastroparesis, polyneuropahy, charcot foot  2. Chronic renal failure, stage 3-4, status post renal transplant in 1999, with chronic immunosuppression  3. HTN  4. HLD  5. Neurogenic bladder with recurrent UTIs  6. Hx ESBL, VRE  7. B/L pulmonary emboli, provoked after knee replacement in 2003  8. Obesity with BMI 36  9. Recurrent encephalopathy of unclear etiology as detailed above    Past Surgical History   1. Renal transplant, 10/99  2. Right TKA  3. Pamela  4. Angiogram in 04/05 without intervention required  5. Cataract, B/L    Prior to Admission Medications   Prior to Admission Medications   Prescriptions Last Dose Informant Patient Reported? Taking?   ACETAMINOPHEN PO unk Other Yes Yes   Sig: Take  325-650 mg by mouth every 4 hours as needed.   Cranberry 400 MG TABS unk Other Yes Yes   Sig: Take 400 mg by mouth 2 times daily   Lactobacillus Rhamnosus, GG, (CULTURELL) capsule unk Other No Yes   Sig: Take 1 capsule by mouth 2 times daily   ORDER FOR DME  Other No No   Sig: Equipment being ordered: Johnson Catheters - 18 french   OXcarbazepine (TRILEPTAL) 300 MG tablet unk Other Yes Yes   Sig: Take 1 tablet (300 mg) by mouth 2 times daily For pain   TACROLIMUS PO 1/18/2018 at 0000 Pharmacy Yes Yes   Sig: Take 2.5 mg by mouth 2 times daily   TraMADol HCl 50 MG TBDP 1/18/2018 at Unknown time Other No Yes   Sig: Take 50 mg by mouth 3 times daily as needed   VITAMIN D, CHOLECALCIFEROL, PO unk Other Yes Yes   Sig: Take 4,000 Units by mouth 2 times daily   aspirin 81 MG chewable tablet unk Other No Yes   Sig: Take 1 tablet (81 mg) by mouth daily Starting 1 week after your kidney biopsy   baclofen (LIORESAL) 10 MG tablet 1/18/2018 at Unknown time Other Yes Yes   Sig: Take 1 tablet (10 mg) by mouth 3 times daily as needed for muscle spasms   blood glucose monitoring (ACCU-CHEK COMPACT PLUS) test strip  Other Yes No   Sig: Use to test blood sugar 3 times daily or as directed.  Ok to substitute alternative if insurance prefers.   cephALEXin (KEFLEX) 500 MG capsule 1/18/2018 at Unknown time  No Yes   Sig: Take 1 capsule (500 mg) by mouth 4 times daily for 7 days   cloNIDine (CATAPRES) 0.1 MG tablet unk Other No Yes   Sig: TAKE ONE TABLET BY MOUTH TWICE A DAY   cyanocobalamin (VITAMIN B12) 1000 MCG/ML injection 1/11/2018 Other No Yes   Sig: Inject 1 mL (1,000 mcg) into the muscle every 30 days   diphenoxylate-atropine (LOMOTIL) 2.5-0.025 MG per tablet unk Other No Yes   Sig: Take 1 tablet by mouth 4 times daily as needed for diarrhea   famotidine (PEPCID) 20 MG tablet unk Other Yes Yes   Sig: Take 1 tablet (20 mg) by mouth 2 times daily   gabapentin (NEURONTIN) 300 MG capsule unk Other No Yes   Sig: Take 3 capsules (900 mg) by  mouth 2 times daily For pain   guaiFENesin (ROBITUSSIN) 100 MG/5ML SYRP unk Other No Yes   Sig: Take 10-20 mLs by mouth every 4 hours as needed for cough   insulin aspart (NOVOLOG FLEXPEN) 100 UNIT/ML injection unk Other No Yes   Sig: Inject 10 units under the skin 2 times daily before meals as directed. Add sliding scale as needed. Avg use 25 units per day   Patient taking differently: Inject 10 units under the skin 2 times daily before meals as directed. Add sliding scale as needed.  2 units for every 50 over 201.  Avg use 25 units per day   insulin glargine (LANTUS SOLOSTAR) 100 UNIT/ML injection unk Other No Yes   Sig: Inject 24 units under the skin daily.   Patient taking differently: Inject 24 Units Subcutaneous At Bedtime Inject 24 units under the skin daily.   insulin pen needle 30G X 8 MM  Other No No   Sig: Use as directed with Lantus   labetalol (NORMODYNE) 300 MG tablet unk Other Yes Yes   Sig: Take 150 mg by mouth 2 times daily Take 1/2 tablet (150mg) =300mg. Hold for systolic BP less than 120.   levothyroxine (SYNTHROID/LEVOTHROID) 50 MCG tablet unk Other No Yes   Sig: TAKE ONE TABLET BY MOUTH EVERY DAY   lidocaine (XYLOCAINE) 2 % topical gel unk Other No Yes   Sig: Apply topically as needed for moderate pain 10 ML every 3 weeks   loratadine (CLARITIN) 10 MG tablet unk Other No Yes   Sig: Take 1 tablet (10 mg) by mouth daily   meclizine (ANTIVERT) 12.5 MG tablet unk Other No Yes   Sig: Take 1 tablet (12.5 mg) by mouth 3 times daily as needed for dizziness   ondansetron (ZOFRAN ODT) 4 MG disintegrating tablet unk Other No Yes   Sig: Take 1-2 tablets (4-8 mg) by mouth every 8 hours as needed for nausea   order for DME  Other No No   Sig: Equipment being ordered: Blue Chux  Please send to [facility] medical   order for DME  Other No No   Sig: Equipment being ordered: [employer] Blends CPAP interface (nasal pillows), size XS.   order for DME  Other No No   Sig: Equipment being ordered: Silver Impregnated catheters  HX  of frequent UTI   order for DME  Other No No   Sig: High back guerrero wheel chair  MICHELLE 99  DX: morbid obesity, generalized weakness, physical deconditioning, chronic vertigo   pravastatin (PRAVACHOL) 10 MG tablet unk Other No Yes   Sig: TAKE ONE TABLET BY MOUTH EVERY DAY   predniSONE (DELTASONE) 5 MG tablet unk Other No Yes   Sig: Take 1 tablet (5 mg) by mouth daily   simethicone (MYLICON) 125 MG CHEW unk Other Yes Yes   Sig: Take 1 tablet (125 mg) by mouth as needed for intestinal gas      Facility-Administered Medications: None     Allergies   Allergies   Allergen Reactions     Bactrim Ds [Sulfamethoxazole W/Trimethoprim]      Creatinine level increased     Atorvastatin Calcium      myalgias, muscle aches     Codeine Nausea and Vomiting     Darvocet [Propoxyphene N-Apap] Nausea and Vomiting     Hydromorphone      Levofloxacin Other (See Comments) and Diarrhea     hallucinations     Morphine      Nausea and vomiting     Niaspan [Niacin] GI Disturbance     Flushing even at low dose, GI upset     Percocet [Oxycodone-Acetaminophen]      Vomiting after taking med couple of times     Vicodin [Hydrocodone-Acetaminophen] Nausea and Vomiting       Social History   Lives at home with  who is primary caregiver; prior smoker - quit 30yrs ago with 17pyhx, no ETOH use    Family History   Father with HD, HTN; Mother with DMII    Review of Systems   10-point ROS performed and otherwise negative, refer to HPI    Physical Exam   Temp: 98.6  F (37  C) Temp src: Oral BP: 172/77   Heart Rate: 73 Resp: 16 SpO2: 95 % O2 Device: None (Room air) Oxygen Delivery: 10 LPM  Vital Signs with Ranges  Temp:  [98.1  F (36.7  C)-98.6  F (37  C)] 98.6  F (37  C)  Heart Rate:  [70-80] 73  Resp:  [0-19] 16  BP: (122-180)/(63-83) 172/77  FiO2 (%):  [40 %] 40 %  SpO2:  [94 %-100 %] 95 %  223 lbs 12.27 oz    GEN: well-developed, well-nourished, appears comfortable  HEENT: NCAT, PERRL, EOM intact bilaterally, sclera clear, conjunctiva normal, nose  & mouth patent, mucous membranes moist  PULM: lungs CTA bilaterally, no increased work of breathing, no wheeze, rales, rhonchi  CV: RRR, S1 & S2, no murmur  GI: soft, nontender, nondistended, +BS in all 4 quadrants  MSK: full AROM bilateral UE/LE, pedal & radial pulses 2+ bilaterally  NEURO: awake, alert, oriented to person, place, time; follows commands, appropriate  SKIN: warm & dry without rash    Data   -Data reviewed today: All pertinent laboratory and imaging results from this encounter were reviewed. I personally reviewed no images or EKG's today.    Recent Labs  Lab 01/22/18  0458 01/21/18  1217 01/21/18  0400 01/20/18  0720 01/20/18  0631   WBC 5.1 7.0  --   --  7.1   HGB 9.9* 10.9*  --   --  11.3*   MCV 93 94  --   --  92   * 149* 157  --  150    141  --  143 Canceled, Test credited   POTASSIUM 4.1 3.7  --  3.4 Canceled, Test credited   CHLORIDE 111* 107  --  110* Canceled, Test credited   CO2 21 23  --  23 Canceled, Test credited   BUN 15 16  --  14 Canceled, Test credited   CR 0.87 1.04  --  0.90 Canceled, Test credited   ANIONGAP 9 11  --  10 Canceled, Test credited   ROGER 7.9* 8.2*  --  8.6 Canceled, Test credited   GLC 81 99  --  116* Canceled, Test credited   ALBUMIN  --   --   --  2.6* Canceled, Test credited   PROTTOTAL  --   --   --  7.1 Canceled, Test credited   BILITOTAL  --   --   --  0.4 Canceled, Test credited   ALKPHOS  --   --   --  106 Canceled, Test credited   ALT  --   --   --  20 Canceled, Test credited   AST  --   --   --  22 Canceled, Test credited       No results found for this or any previous visit (from the past 24 hour(s)).

## 2018-01-22 NOTE — PLAN OF CARE
Problem: Patient Care Overview  Goal: Plan of Care/Patient Progress Review  Discharge Planner SLP   Patient plan for discharge: Patient did not state.   Current status: Bedside swallow evaluation completed. Patient presents with moderate oral and pharyngeal dysphagia secondary to post intubation x3 days. Deficits include: reduced bolus control with premature entry of thin liquids via thge cup but no overt Sx of aspiration. Tolerated nectar thick liquids by the spoon without Sx of aspiration. She demonstrated reduced bolus coordiation and AP propulsion of the bolus, mild delay but no overt Sx of aspiration. Mastication was reduced for semi-solids and mild oral residue. Talking while masticating increasing her risk for aspiration. Recommend: 1. Dysphagia Diet level 1 with thin liquids, 2. Upright, supervision/assistance, no straws, small single sips, alternate liquids/solids. if coughing noted with thin change to nectar. Crush medications and place in pudding.   Barriers to return to prior living situation: Cognition  Recommendations for discharge: IP rehab  Rationale for recommendations: ST to follow for swallowing not at baseline diet.        Entered by: Pily Boyce 01/22/2018 3:36 PM

## 2018-01-23 ENCOUNTER — APPOINTMENT (OUTPATIENT)
Dept: SPEECH THERAPY | Facility: CLINIC | Age: 73
DRG: 100 | End: 2018-01-23
Payer: MEDICARE

## 2018-01-23 ENCOUNTER — TELEPHONE (OUTPATIENT)
Dept: FAMILY MEDICINE | Facility: CLINIC | Age: 73
End: 2018-01-23

## 2018-01-23 LAB
C DIFF TOX B STL QL: NEGATIVE
GLUCOSE BLDC GLUCOMTR-MCNC: 103 MG/DL (ref 70–99)
GLUCOSE BLDC GLUCOMTR-MCNC: 103 MG/DL (ref 70–99)
GLUCOSE BLDC GLUCOMTR-MCNC: 137 MG/DL (ref 70–99)
GLUCOSE BLDC GLUCOMTR-MCNC: 141 MG/DL (ref 70–99)
GLUCOSE BLDC GLUCOMTR-MCNC: 149 MG/DL (ref 70–99)
MAGNESIUM SERPL-MCNC: 2.5 MG/DL (ref 1.6–2.3)
SPECIMEN SOURCE: NORMAL

## 2018-01-23 PROCEDURE — 87493 C DIFF AMPLIFIED PROBE: CPT | Performed by: INTERNAL MEDICINE

## 2018-01-23 PROCEDURE — 36415 COLL VENOUS BLD VENIPUNCTURE: CPT | Performed by: INTERNAL MEDICINE

## 2018-01-23 PROCEDURE — 25000132 ZZH RX MED GY IP 250 OP 250 PS 637: Mod: GY | Performed by: INTERNAL MEDICINE

## 2018-01-23 PROCEDURE — 25000128 H RX IP 250 OP 636: Performed by: INTERNAL MEDICINE

## 2018-01-23 PROCEDURE — 25000131 ZZH RX MED GY IP 250 OP 636 PS 637: Mod: GY | Performed by: INTERNAL MEDICINE

## 2018-01-23 PROCEDURE — A9270 NON-COVERED ITEM OR SERVICE: HCPCS | Mod: GY | Performed by: INTERNAL MEDICINE

## 2018-01-23 PROCEDURE — 92526 ORAL FUNCTION THERAPY: CPT | Mod: GN | Performed by: SPEECH-LANGUAGE PATHOLOGIST

## 2018-01-23 PROCEDURE — 25000132 ZZH RX MED GY IP 250 OP 250 PS 637: Mod: GY | Performed by: PHYSICIAN ASSISTANT

## 2018-01-23 PROCEDURE — 25000132 ZZH RX MED GY IP 250 OP 250 PS 637: Mod: GY | Performed by: PSYCHIATRY & NEUROLOGY

## 2018-01-23 PROCEDURE — 00000146 ZZHCL STATISTIC GLUCOSE BY METER IP

## 2018-01-23 PROCEDURE — 99233 SBSQ HOSP IP/OBS HIGH 50: CPT | Performed by: PHYSICIAN ASSISTANT

## 2018-01-23 PROCEDURE — 83735 ASSAY OF MAGNESIUM: CPT | Performed by: INTERNAL MEDICINE

## 2018-01-23 PROCEDURE — 40000225 ZZH STATISTIC SLP WARD VISIT: Performed by: SPEECH-LANGUAGE PATHOLOGIST

## 2018-01-23 PROCEDURE — 25000125 ZZHC RX 250: Performed by: INTERNAL MEDICINE

## 2018-01-23 PROCEDURE — A9270 NON-COVERED ITEM OR SERVICE: HCPCS | Mod: GY | Performed by: PHYSICIAN ASSISTANT

## 2018-01-23 PROCEDURE — A9270 NON-COVERED ITEM OR SERVICE: HCPCS | Mod: GY | Performed by: PSYCHIATRY & NEUROLOGY

## 2018-01-23 PROCEDURE — 12000000 ZZH R&B MED SURG/OB

## 2018-01-23 RX ORDER — LOPERAMIDE HCL 2 MG
2 CAPSULE ORAL 4 TIMES DAILY PRN
Status: DISCONTINUED | OUTPATIENT
Start: 2018-01-23 | End: 2018-01-25 | Stop reason: HOSPADM

## 2018-01-23 RX ORDER — GABAPENTIN 300 MG/1
600 CAPSULE ORAL 2 TIMES DAILY
Status: DISCONTINUED | OUTPATIENT
Start: 2018-01-23 | End: 2018-01-25 | Stop reason: HOSPADM

## 2018-01-23 RX ADMIN — Medication 150 MG: at 09:20

## 2018-01-23 RX ADMIN — HEPARIN SODIUM 5000 UNITS: 5000 INJECTION, SOLUTION INTRAVENOUS; SUBCUTANEOUS at 00:05

## 2018-01-23 RX ADMIN — INSULIN ASPART 1 UNITS: 100 INJECTION, SOLUTION INTRAVENOUS; SUBCUTANEOUS at 20:50

## 2018-01-23 RX ADMIN — HEPARIN SODIUM 5000 UNITS: 5000 INJECTION, SOLUTION INTRAVENOUS; SUBCUTANEOUS at 09:29

## 2018-01-23 RX ADMIN — PREDNISONE 5 MG: 5 TABLET ORAL at 09:21

## 2018-01-23 RX ADMIN — CLONIDINE HYDROCHLORIDE 0.1 MG: 0.1 TABLET ORAL at 09:20

## 2018-01-23 RX ADMIN — HEPARIN SODIUM 5000 UNITS: 5000 INJECTION, SOLUTION INTRAVENOUS; SUBCUTANEOUS at 17:56

## 2018-01-23 RX ADMIN — ASPIRIN 81 MG 81 MG: 81 TABLET ORAL at 09:21

## 2018-01-23 RX ADMIN — TACROLIMUS 2 MG: 1 CAPSULE ORAL at 17:56

## 2018-01-23 RX ADMIN — GABAPENTIN 600 MG: 300 CAPSULE ORAL at 20:45

## 2018-01-23 RX ADMIN — INSULIN ASPART 1 UNITS: 100 INJECTION, SOLUTION INTRAVENOUS; SUBCUTANEOUS at 18:04

## 2018-01-23 RX ADMIN — Medication 150 MG: at 20:45

## 2018-01-23 RX ADMIN — LEVETIRACETAM 1000 MG: 500 TABLET, FILM COATED ORAL at 09:20

## 2018-01-23 RX ADMIN — POTASSIUM CHLORIDE AND SODIUM CHLORIDE: 450; 150 INJECTION, SOLUTION INTRAVENOUS at 04:33

## 2018-01-23 RX ADMIN — LEVOTHYROXINE SODIUM 50 MCG: 50 TABLET ORAL at 06:43

## 2018-01-23 RX ADMIN — TACROLIMUS 2 MG: 1 CAPSULE ORAL at 06:43

## 2018-01-23 RX ADMIN — LEVETIRACETAM 1000 MG: 500 TABLET, FILM COATED ORAL at 20:45

## 2018-01-23 RX ADMIN — LOPERAMIDE HYDROCHLORIDE 2 MG: 2 CAPSULE ORAL at 20:45

## 2018-01-23 RX ADMIN — CLONIDINE HYDROCHLORIDE 0.1 MG: 0.1 TABLET ORAL at 20:50

## 2018-01-23 RX ADMIN — PANTOPRAZOLE SODIUM 40 MG: 40 TABLET, DELAYED RELEASE ORAL at 09:21

## 2018-01-23 ASSESSMENT — ACTIVITIES OF DAILY LIVING (ADL)
ADLS_ACUITY_SCORE: 31

## 2018-01-23 ASSESSMENT — VISUAL ACUITY
OU: BLURRED VISION
OU: BLURRED VISION
OU: NORMAL ACUITY;BASELINE
OU: BLURRED VISION
OU: BLURRED VISION
OU: NORMAL ACUITY;BASELINE

## 2018-01-23 NOTE — CONSULTS
"CLINICAL NUTRITION SERVICES  -  ASSESSMENT NOTE      Recommendations Ordered by Registered Dietitian (RD):   Magic Shakes between meals     Malnutrition:   Patient does not meet two of the criteria necessary for diagnosing malnutrition        REASON FOR ASSESSMENT  Gem Jade is a 72 year old female seen by Registered Dietitian for Nutrition Albino <3      NUTRITION HISTORY  - Information obtained from the pt.  She follows a regular diet at home, had been drinking Ensure BID.  She says she hasn't had much of an appetite for meat lately, likes vegetables and fruit.      CURRENT NUTRITION ORDERS  Diet Order:   Dysphagia 1, NT   Pt had been NPO x 5 days until diet ordered yesterday.    Current Intake/Tolerance:  Pt hasn't ordered breakfast or lunch today but she does have a thickened Ensure at bedside, untouched.  She states \"I might have C. diff\" in response to why she isn't eating.      PHYSICAL FINDINGS  Observed  No nutrition-related physical findings observed  Obtained from Chart/Interdisciplinary Team  None noted    ANTHROPOMETRICS  Height: 5' 6\"  Weight: 227 lbs 15.29 oz (103.4 kg  Body mass index is 36.79 kg/(m^2).  Weight Status:  Obesity Grade II BMI 35-39.9  IBW: 59 kg +/- 10%  % IBW: 175%  Weight History: Weight  Is up ~27# since last spring.  Wt Readings from Last 10 Encounters:   01/23/18 103.4 kg (227 lb 15.3 oz)   01/14/18 102.9 kg (226 lb 14.4 oz)   12/23/17 108 kg (238 lb)   11/17/17 101.7 kg (224 lb 1.6 oz)   11/08/17 103.5 kg (228 lb 2.8 oz)   04/27/17 90.5 kg (199 lb 8 oz)   03/15/17 91.2 kg (201 lb)   02/20/17 93.1 kg (205 lb 4.8 oz)   09/03/16 91.4 kg (201 lb 8 oz)   08/09/16 90.7 kg (200 lb)       LABS  Labs reviewed    MEDICATIONS  Medications reviewed    Dosing Weight 69.7 kg - adjusted for obesity    ASSESSED NUTRITION NEEDS PER APPROVED PRACTICE GUIDELINES:  Estimated Energy Needs: 2099-7897 kcals (25-30 Kcal/Kg)  Justification: obese  Estimated Protein Needs: 55-70 grams protein (0.8-1 " g pro/Kg)  Justification: s/p renal transplant    MALNUTRITION:  % Weight Loss:  None noted  % Intake:  </= 50% for >/= 5 days (severe malnutrition)  Subcutaneous Fat Loss:  None observed  Muscle Loss:  None observed  Fluid Retention:  None noted    Malnutrition Diagnosis: Patient does not meet two of the above criteria necessary for diagnosing malnutrition      NUTRITION DIAGNOSIS:  Inadequate oral intake related to intubation, as evidenced by NPO status x 5 days and now decreased appetite with minimal intake      NUTRITION INTERVENTIONS  Recommendations / Nutrition Prescription  Diet per SLP  Nutritional supplements.      Implementation  Nutrition education: Provided education on nutrional supplements  Medical Food Supplement - will send Magic Shakes between meals      Nutrition Goals  Pt will order 3 meals/day  Pt will consume at least 50% of meals and supplements        MONITORING AND EVALUATION:  Progress towards goals will be monitored and evaluated per protocol and Practice Guidelines    Vanessa Rubio RD  Pager [phone] (M-F)            [phone] (W/E & Hol)

## 2018-01-23 NOTE — PROVIDER NOTIFICATION
"Paged Rich Clancy, \"pt urine output 150mL this shift & last night. Any concerns or ok since CKD stage 3-4? Thanks!\"    ADDENDUM: Rich called back TORB to increase IV fluids to 100mL/hr, place Imodium PRN, & BMP order for AM  "

## 2018-01-23 NOTE — PROVIDER NOTIFICATION
"Chang pts  called to talk to writer. 1st time talking to him was at 1410, put him on hold to verify ok to give out info because he did not have code, he hung up and called back right away very upset & angrily  stated,  \"I tried calling you 4 times today, what's going on!\"     Paged SHARAD Austin. pts , Chang called wanting update.  "

## 2018-01-23 NOTE — TELEPHONE ENCOUNTER
TC from  about patient's medications, frustrated with hospital and their explanation of what is going on with Shantelle.  Concerned over the plan of care moving forward, feels like someone needs to address her mental confusion, unresponsiveness and encephalopathy.  Worried about going to a TCU as the last time she went she was there for over one year.     P:  Reviewed plan of care from the hospital chart with Keshawn, discussed medications that may be contributing to her issues and the plan to slowly reintroduce and monitor.  Plan is to discharge in 1-2 days, recommendation is to go to a TCU.  Discussed the benefits of the TCU to stabilize more and to concentrate on meds that are contributing factors.  Recommend he ask to be transferred to a facility that has  geriatrics working as the medical team for the facility.  He will meet with SW from the hospital and request that and keep us posted.  He is appreciative of the call and feels better about the plan.    Klaudia MTZ

## 2018-01-23 NOTE — PLAN OF CARE
Problem: Patient Care Overview  Goal: Plan of Care/Patient Progress Review  Outcome: Improving  A&O x4, forgetful and anxious, thinks friends have been around all night and is worried about being left alone, reassurance provided. 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 VSS. Johnson patent, large BM for PM shift.  Tele NSR. DD1 w/ thin liquids diet, tolerated pills one at a time. Contact iso precautions maintained.  Szr precautions maintained, no szr activity noted or reported.  Up with lift, T/R q2.  Mepilex to L coccyx CDI, abrasion or possible pressure ulcer noted. Denies pain. Plan PT/OT, d/c pending.

## 2018-01-23 NOTE — PLAN OF CARE
Problem: Patient Care Overview  Goal: Plan of Care/Patient Progress Review  Pt a/o x 4, confused/forgetful. Keeps stating that she hears her family, and that we are denying them entry to her room. Family has not been on unit and per ICU  was called. VSS. Denies pain. Neuros stable. Blind in L eye, cloudy vision in R eye. Speech eval completed pt is able to eat pureed, thin liquids. Pt has pressure ulcer on coccycx, covered with mepliex. Pt refused to use bedpan and will only have BM in brief. Johnson in place, IVF infusing. W/c bound at baseline, rolls with A2.
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
## 2018-01-23 NOTE — PLAN OF CARE
Problem: Patient Care Overview  Goal: Plan of Care/Patient Progress Review  Discharge Planner SLP   Patient plan for discharge: Patient did not state.  Current status: Patient seen for swallowing treatment. She continues to demonstrate moderate oral pharyngeal dysphagia, and overall weakness. She demonstrated throat clearing and coughing with thin liquids from the cup. Patient consumed nectar thick liquids with no overt s/sx of aspiration. Semi-solid texture resulted in delayed cough and throat clear. Recommend 1) DDL1 diet with nectar thick liquids by spoon or small sip (no straw), 2) Upright, supervision/assistance, small single sips, alternate liquids/solids, 3) crush medications in pudding, 4) SLP will follow-up for diet tolerance and advancement.   Barriers to return to prior living situation: Cognition, generalized weakness  Recommendations for discharge: IP rehab  Rationale for recommendations: ST to follow if swallowing is not at baseline.       Entered by: Elida Browne 01/23/2018 9:11 AM
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
## 2018-01-24 ENCOUNTER — APPOINTMENT (OUTPATIENT)
Dept: SPEECH THERAPY | Facility: CLINIC | Age: 73
DRG: 100 | End: 2018-01-24
Payer: MEDICARE

## 2018-01-24 LAB
ANION GAP SERPL CALCULATED.3IONS-SCNC: 9 MMOL/L (ref 3–14)
BACTERIA SPEC CULT: NO GROWTH
BACTERIA SPEC CULT: NO GROWTH
BUN SERPL-MCNC: 18 MG/DL (ref 7–30)
CALCIUM SERPL-MCNC: 8.1 MG/DL (ref 8.5–10.1)
CHLORIDE SERPL-SCNC: 107 MMOL/L (ref 94–109)
CO2 SERPL-SCNC: 19 MMOL/L (ref 20–32)
CREAT SERPL-MCNC: 1.11 MG/DL (ref 0.52–1.04)
GFR SERPL CREATININE-BSD FRML MDRD: 48 ML/MIN/1.7M2
GLUCOSE BLDC GLUCOMTR-MCNC: 100 MG/DL (ref 70–99)
GLUCOSE BLDC GLUCOMTR-MCNC: 106 MG/DL (ref 70–99)
GLUCOSE BLDC GLUCOMTR-MCNC: 111 MG/DL (ref 70–99)
GLUCOSE BLDC GLUCOMTR-MCNC: 138 MG/DL (ref 70–99)
GLUCOSE BLDC GLUCOMTR-MCNC: 194 MG/DL (ref 70–99)
GLUCOSE BLDC GLUCOMTR-MCNC: 203 MG/DL (ref 70–99)
GLUCOSE SERPL-MCNC: 107 MG/DL (ref 70–99)
Lab: NORMAL
Lab: NORMAL
PLATELET # BLD AUTO: 162 10E9/L (ref 150–450)
POTASSIUM SERPL-SCNC: 5.1 MMOL/L (ref 3.4–5.3)
SODIUM SERPL-SCNC: 135 MMOL/L (ref 133–144)
SPECIMEN SOURCE: NORMAL
SPECIMEN SOURCE: NORMAL
VIT B1 BLD-MCNC: 70 NMOL/L (ref 70–180)

## 2018-01-24 PROCEDURE — 25000131 ZZH RX MED GY IP 250 OP 636 PS 637: Mod: GY | Performed by: INTERNAL MEDICINE

## 2018-01-24 PROCEDURE — A9270 NON-COVERED ITEM OR SERVICE: HCPCS | Mod: GY | Performed by: INTERNAL MEDICINE

## 2018-01-24 PROCEDURE — 25000125 ZZHC RX 250: Performed by: INTERNAL MEDICINE

## 2018-01-24 PROCEDURE — 25000132 ZZH RX MED GY IP 250 OP 250 PS 637: Mod: GY | Performed by: PSYCHIATRY & NEUROLOGY

## 2018-01-24 PROCEDURE — 25000132 ZZH RX MED GY IP 250 OP 250 PS 637: Mod: GY | Performed by: PHYSICIAN ASSISTANT

## 2018-01-24 PROCEDURE — A9270 NON-COVERED ITEM OR SERVICE: HCPCS | Mod: GY | Performed by: PSYCHIATRY & NEUROLOGY

## 2018-01-24 PROCEDURE — 00000146 ZZHCL STATISTIC GLUCOSE BY METER IP

## 2018-01-24 PROCEDURE — 85049 AUTOMATED PLATELET COUNT: CPT | Performed by: PHYSICIAN ASSISTANT

## 2018-01-24 PROCEDURE — 25000132 ZZH RX MED GY IP 250 OP 250 PS 637: Mod: GY | Performed by: INTERNAL MEDICINE

## 2018-01-24 PROCEDURE — 92526 ORAL FUNCTION THERAPY: CPT | Mod: GN | Performed by: SPEECH-LANGUAGE PATHOLOGIST

## 2018-01-24 PROCEDURE — 80048 BASIC METABOLIC PNL TOTAL CA: CPT | Performed by: PHYSICIAN ASSISTANT

## 2018-01-24 PROCEDURE — 12000000 ZZH R&B MED SURG/OB

## 2018-01-24 PROCEDURE — G0463 HOSPITAL OUTPT CLINIC VISIT: HCPCS

## 2018-01-24 PROCEDURE — 36415 COLL VENOUS BLD VENIPUNCTURE: CPT | Performed by: PHYSICIAN ASSISTANT

## 2018-01-24 PROCEDURE — 40000225 ZZH STATISTIC SLP WARD VISIT: Performed by: SPEECH-LANGUAGE PATHOLOGIST

## 2018-01-24 PROCEDURE — A9270 NON-COVERED ITEM OR SERVICE: HCPCS | Mod: GY | Performed by: PHYSICIAN ASSISTANT

## 2018-01-24 PROCEDURE — 99233 SBSQ HOSP IP/OBS HIGH 50: CPT | Performed by: INTERNAL MEDICINE

## 2018-01-24 PROCEDURE — 25000128 H RX IP 250 OP 636: Performed by: PHYSICIAN ASSISTANT

## 2018-01-24 PROCEDURE — 40000894 ZZH STATISTIC OT IP EVAL DEFER

## 2018-01-24 PROCEDURE — 25000128 H RX IP 250 OP 636: Performed by: INTERNAL MEDICINE

## 2018-01-24 RX ORDER — ACETAMINOPHEN 325 MG/1
650 TABLET ORAL EVERY 6 HOURS PRN
Status: DISCONTINUED | OUTPATIENT
Start: 2018-01-24 | End: 2018-01-25 | Stop reason: HOSPADM

## 2018-01-24 RX ADMIN — GABAPENTIN 600 MG: 300 CAPSULE ORAL at 20:55

## 2018-01-24 RX ADMIN — PREDNISONE 5 MG: 5 TABLET ORAL at 10:23

## 2018-01-24 RX ADMIN — INSULIN ASPART 1 UNITS: 100 INJECTION, SOLUTION INTRAVENOUS; SUBCUTANEOUS at 20:56

## 2018-01-24 RX ADMIN — ASPIRIN 81 MG 81 MG: 81 TABLET ORAL at 10:23

## 2018-01-24 RX ADMIN — HEPARIN SODIUM 5000 UNITS: 5000 INJECTION, SOLUTION INTRAVENOUS; SUBCUTANEOUS at 01:06

## 2018-01-24 RX ADMIN — Medication 150 MG: at 20:56

## 2018-01-24 RX ADMIN — TACROLIMUS 2 MG: 1 CAPSULE ORAL at 05:34

## 2018-01-24 RX ADMIN — LEVETIRACETAM 1000 MG: 500 TABLET, FILM COATED ORAL at 10:23

## 2018-01-24 RX ADMIN — CLONIDINE HYDROCHLORIDE 0.1 MG: 0.1 TABLET ORAL at 20:56

## 2018-01-24 RX ADMIN — CLONIDINE HYDROCHLORIDE 0.1 MG: 0.1 TABLET ORAL at 10:24

## 2018-01-24 RX ADMIN — GABAPENTIN 600 MG: 300 CAPSULE ORAL at 10:23

## 2018-01-24 RX ADMIN — POTASSIUM CHLORIDE AND SODIUM CHLORIDE: 450; 150 INJECTION, SOLUTION INTRAVENOUS at 03:40

## 2018-01-24 RX ADMIN — PANTOPRAZOLE SODIUM 40 MG: 40 TABLET, DELAYED RELEASE ORAL at 10:23

## 2018-01-24 RX ADMIN — LEVOTHYROXINE SODIUM 50 MCG: 50 TABLET ORAL at 05:34

## 2018-01-24 RX ADMIN — Medication 150 MG: at 10:23

## 2018-01-24 RX ADMIN — TACROLIMUS 2 MG: 1 CAPSULE ORAL at 17:16

## 2018-01-24 RX ADMIN — LEVETIRACETAM 1000 MG: 500 TABLET, FILM COATED ORAL at 20:56

## 2018-01-24 RX ADMIN — HEPARIN SODIUM 5000 UNITS: 5000 INJECTION, SOLUTION INTRAVENOUS; SUBCUTANEOUS at 17:15

## 2018-01-24 RX ADMIN — INSULIN ASPART 1 UNITS: 100 INJECTION, SOLUTION INTRAVENOUS; SUBCUTANEOUS at 17:16

## 2018-01-24 RX ADMIN — HEPARIN SODIUM 5000 UNITS: 5000 INJECTION, SOLUTION INTRAVENOUS; SUBCUTANEOUS at 10:32

## 2018-01-24 ASSESSMENT — ACTIVITIES OF DAILY LIVING (ADL)
ADLS_ACUITY_SCORE: 35
ADLS_ACUITY_SCORE: 35
ADLS_ACUITY_SCORE: 31
ADLS_ACUITY_SCORE: 35
ADLS_ACUITY_SCORE: 31
ADLS_ACUITY_SCORE: 31

## 2018-01-24 ASSESSMENT — VISUAL ACUITY
OU: BLURRED VISION

## 2018-01-24 NOTE — PLAN OF CARE
Problem: Patient Care Overview  Goal: Plan of Care/Patient Progress Review  Outcome: No Change  A&Ox4. VSS on RA.Paranoid at times. Up with 2 with lift. WC at baseline. Ammodium given for loose stools. No bm overnight. Chronic rico in place. IVF infusing. Glucose 141, 111, and 100 overnight. Mepilex on coccyx. Turned and repo as tolerated. Contact iso maintained. Denies pain. Neuros intact ex LE weakness at baseline.

## 2018-01-24 NOTE — PLAN OF CARE
Problem: Patient Care Overview  Goal: Plan of Care/Patient Progress Review  Outcome: No Change  A&Ox4, some forgetfulness & paranoia noted. Neuros intact except baseline BLE 1/5 strength & neuropathy in all extremities (baseline). VSS ex mild HTN. Tele NSR. DD1 NT diet, poor appetite & declined eating during the day because she was nervous about her c.diff results, supper ordered & minimal intake.  Up with lift. C/o back pain, relief with repositioning. Skin breakdown on coccyx--WOC consult placed per MD. C diff negative, imodium ordered. BS active, multiple loose stools today--decreased over the day. No seizure activity noted. Plan pending TCU vs home care.
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
## 2018-01-24 NOTE — PLAN OF CARE
Problem: Patient Care Overview  Goal: Plan of Care/Patient Progress Review  Discharge Planner SLP   Patient plan for discharge: Patient did not state.   Current status:  Patient was seen for swallow treatment bedside voice is still soft. She was given trials of water by spoon and straw. Premature entry and 2 swallows per sip but no overt Sx of aspiration on 10/10 trials. Pureed textures were swallowed without difficulty. She demonstrated improved mastication of semi-solids and solids with good oral clearing. No vocal changes or Sx of aspiration with trials given. Recommend: 1. Advanced diet to a DDL3 with thin liquids. 2. Full upright, assistance, straws ok, small sips/bites, check for oral residue and slow rate of eating/feeding. If coughing noted with thins change back to nectar. Pills in apple sauce. 3. SLP will f/u for diet tolerance on 1/25/18.   Barriers to return to prior living situation: Deconditioning.  Recommendations for discharge: Home with home health and ST.   Rationale for recommendations: May need 1-2 f/u sessions with Home health ST to insure diet tolerance and family/patient education.        Entered by: Pily Boyce 01/24/2018 10:43 AM
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
## 2018-01-24 NOTE — PROGRESS NOTES
[facility] Nurse Inpatient Skin Assessment     Initial Assessment of:   Coccyx/buttocks        Data:   Patient History:      per MD note(s):  Gem Jade is a 72 year old female with a past medical history of neurogenic bladder with indwelling rico cath, recurrent UTI including ESBL klebsiella, ESRD s/p renal transplant in  with chronic immunosuppressive therapy, HTN, hyperlipidemia, and recurrent encephalopathy of unclear etiology with recent hospital admissions in 2017 and 2018 due to altered mental status presumed to be infectious related to recurrent UTI who was admitted to the intensivist service on 18 after being found unresponsive at home.  She was intubated due to inability to protect airway, neuroICU consulted and empirically treated with versed/keppra trial with clinical improvement. Patient has since returned to baseline and extubated . Hospitalist service was contacted to assume medical care.       Albino Assessment and sub scores:   Albino Score  Av.1  Min: 10  Max: 17    Positioning: Pillows    Mattress:  Standard , Atmos Air mattress       Moisture Management:  Incontinence Protocol, Diaper for loose stools, and Urinary Catheter    Catheter secured? Yes      Current Diet / Nutrition:           Active Diet Order      Combination Diet Dysphagia Diet Level 3: Advanced; Thin Liquids (water, ice chips, juice, milk gelatin, ice cream, etc)    Labs:   Recent Labs   Lab Test  18   0458   18   0720   18   0648   17   1652   17   1243   ALBUMIN   --    --   2.6*   < >   --    < >  3.1*   < >  3.0*   HGB  9.9*   < >   --    < >  11.7   < >  12.7   < >  10.9*   INR   --    --    --    --    --    --   1.09   < >  1.01   WBC  5.1   < >   --    < >  5.4   < >  13.5*   < >  5.0   A1C   --    --    --    --   6.3*   < >   --    < >   --    CRP   --    --    --    --    --    --    --    --   7.5    < > = values in this interval not  displayed.         Skin Assessment (location):   Coccyx/buttocks  History:  Pt with recent frequent diarrhea, no stool at assessment, per RN is finally slowing down.  Cdiff negative.      Skin: scattered patchy pink semi-moist denudement across lower coccyx area and inner upper buttocks.  Blanchable throughout.  No real defined wounds.  Scattered peely thin old epidermis.  No satellite lesions to indicate fungal rash at this time.      Color: pink    Temperature  normal     Drainage:  Scant weepy    Odor: none    Pain:  moderate            Intervention:     Patient's chart evaluated.      Assessed: skin     Orders  Written    Supplies  at bedside    Discussed plan of care with Patient and Nurse          Assessment:      IAD (incontinence associated dermatitis) from recent frequent loose stools.  Per nursing, stooling starting to lessen now.  No evidence of pressure injury, though pt at risk.   Pt can however roll very far onto her sides and offload area well - will encourage her to do this throughout the day.  Would avoid Mepilex since is just trapping moisture and is unnecessary.          Plan:   Nursing to notify the Provider(s) and re-consult the WOC Nurse if skin deteriorate(s).      Skin care plan to coccyx/buttocks: BID and prn:  1.  Cleanse gently with Jermaine perineal lotion and soft dry wipes.    2.  Apply thin glaze of Critic-Aid barrier paste over area.    3.  Leave open to air; no Mepilex.  Keep brief open when able.    4.  PIP measures:  Reposition every 1-2 hrs, side to side only.  Several times a day, help pt to get really far onto her side, for as long as comfortably tolerated, to completely offload buttocks and have area open to air.        WOC Nurse will return: weekly and prn

## 2018-01-24 NOTE — PLAN OF CARE
Problem: Patient Care Overview  Goal: Plan of Care/Patient Progress Review  OT/PT: Orders received, chart reviewed. Per OT/PT notes from recent admission this month, pt lives at home with spouse and has A with all cares. Pt is dependent on lift for transfers and uses w/c at baseline for mobility. Noted to have had home services as well. Chart indicated SW needs for safe d/c placement- will defer to SW. At this time, pt has no IP skilled therapy needs. OT to complete orders at this time. Thank you for the consult orders.

## 2018-01-25 ENCOUNTER — APPOINTMENT (OUTPATIENT)
Dept: SPEECH THERAPY | Facility: CLINIC | Age: 73
DRG: 100 | End: 2018-01-25
Payer: MEDICARE

## 2018-01-25 VITALS
RESPIRATION RATE: 16 BRPM | BODY MASS INDEX: 37.52 KG/M2 | HEIGHT: 66 IN | DIASTOLIC BLOOD PRESSURE: 63 MMHG | TEMPERATURE: 97.9 F | WEIGHT: 233.47 LBS | OXYGEN SATURATION: 95 % | HEART RATE: 80 BPM | SYSTOLIC BLOOD PRESSURE: 137 MMHG

## 2018-01-25 LAB
GLUCOSE BLDC GLUCOMTR-MCNC: 155 MG/DL (ref 70–99)
GLUCOSE BLDC GLUCOMTR-MCNC: 156 MG/DL (ref 70–99)
GLUCOSE BLDC GLUCOMTR-MCNC: 175 MG/DL (ref 70–99)
GLUCOSE BLDC GLUCOMTR-MCNC: 241 MG/DL (ref 70–99)
TACROLIMUS BLD-MCNC: 4.7 UG/L (ref 5–15)
TME LAST DOSE: ABNORMAL H

## 2018-01-25 PROCEDURE — 36415 COLL VENOUS BLD VENIPUNCTURE: CPT | Performed by: INTERNAL MEDICINE

## 2018-01-25 PROCEDURE — A9270 NON-COVERED ITEM OR SERVICE: HCPCS | Mod: GY | Performed by: PSYCHIATRY & NEUROLOGY

## 2018-01-25 PROCEDURE — A9270 NON-COVERED ITEM OR SERVICE: HCPCS | Mod: GY | Performed by: INTERNAL MEDICINE

## 2018-01-25 PROCEDURE — 40000225 ZZH STATISTIC SLP WARD VISIT: Performed by: SPEECH-LANGUAGE PATHOLOGIST

## 2018-01-25 PROCEDURE — 25000128 H RX IP 250 OP 636: Performed by: INTERNAL MEDICINE

## 2018-01-25 PROCEDURE — 25000131 ZZH RX MED GY IP 250 OP 636 PS 637: Mod: GY | Performed by: INTERNAL MEDICINE

## 2018-01-25 PROCEDURE — 25000125 ZZHC RX 250: Performed by: INTERNAL MEDICINE

## 2018-01-25 PROCEDURE — 25000132 ZZH RX MED GY IP 250 OP 250 PS 637: Mod: GY | Performed by: INTERNAL MEDICINE

## 2018-01-25 PROCEDURE — 99221 1ST HOSP IP/OBS SF/LOW 40: CPT | Performed by: PSYCHIATRY & NEUROLOGY

## 2018-01-25 PROCEDURE — 80197 ASSAY OF TACROLIMUS: CPT | Performed by: INTERNAL MEDICINE

## 2018-01-25 PROCEDURE — 00000146 ZZHCL STATISTIC GLUCOSE BY METER IP

## 2018-01-25 PROCEDURE — 92526 ORAL FUNCTION THERAPY: CPT | Mod: GN | Performed by: SPEECH-LANGUAGE PATHOLOGIST

## 2018-01-25 PROCEDURE — 25000132 ZZH RX MED GY IP 250 OP 250 PS 637: Mod: GY | Performed by: PHYSICIAN ASSISTANT

## 2018-01-25 PROCEDURE — A9270 NON-COVERED ITEM OR SERVICE: HCPCS | Mod: GY | Performed by: PHYSICIAN ASSISTANT

## 2018-01-25 PROCEDURE — 25000132 ZZH RX MED GY IP 250 OP 250 PS 637: Mod: GY | Performed by: PSYCHIATRY & NEUROLOGY

## 2018-01-25 PROCEDURE — 99239 HOSP IP/OBS DSCHRG MGMT >30: CPT | Performed by: INTERNAL MEDICINE

## 2018-01-25 RX ORDER — GABAPENTIN 300 MG/1
600 CAPSULE ORAL 2 TIMES DAILY
Qty: 360 CAPSULE | Refills: 3 | COMMUNITY
Start: 2018-01-25 | End: 2018-07-17

## 2018-01-25 RX ORDER — LEVETIRACETAM 1000 MG/1
1000 TABLET ORAL 2 TIMES DAILY
Qty: 60 TABLET | Refills: 1 | Status: SHIPPED | OUTPATIENT
Start: 2018-01-25 | End: 2018-03-16

## 2018-01-25 RX ADMIN — GABAPENTIN 600 MG: 300 CAPSULE ORAL at 09:50

## 2018-01-25 RX ADMIN — INSULIN ASPART 1 UNITS: 100 INJECTION, SOLUTION INTRAVENOUS; SUBCUTANEOUS at 09:45

## 2018-01-25 RX ADMIN — HEPARIN SODIUM 5000 UNITS: 5000 INJECTION, SOLUTION INTRAVENOUS; SUBCUTANEOUS at 01:34

## 2018-01-25 RX ADMIN — LEVETIRACETAM 1000 MG: 500 TABLET, FILM COATED ORAL at 09:50

## 2018-01-25 RX ADMIN — TACROLIMUS 2 MG: 1 CAPSULE ORAL at 09:44

## 2018-01-25 RX ADMIN — HEPARIN SODIUM 5000 UNITS: 5000 INJECTION, SOLUTION INTRAVENOUS; SUBCUTANEOUS at 09:47

## 2018-01-25 RX ADMIN — INSULIN ASPART 2 UNITS: 100 INJECTION, SOLUTION INTRAVENOUS; SUBCUTANEOUS at 13:54

## 2018-01-25 RX ADMIN — CLONIDINE HYDROCHLORIDE 0.1 MG: 0.1 TABLET ORAL at 09:50

## 2018-01-25 RX ADMIN — Medication 150 MG: at 09:49

## 2018-01-25 RX ADMIN — ASPIRIN 81 MG 81 MG: 81 TABLET ORAL at 09:49

## 2018-01-25 RX ADMIN — PANTOPRAZOLE SODIUM 40 MG: 40 TABLET, DELAYED RELEASE ORAL at 09:49

## 2018-01-25 RX ADMIN — INSULIN ASPART 1 UNITS: 100 INJECTION, SOLUTION INTRAVENOUS; SUBCUTANEOUS at 04:53

## 2018-01-25 RX ADMIN — PREDNISONE 5 MG: 5 TABLET ORAL at 09:49

## 2018-01-25 RX ADMIN — INSULIN ASPART 1 UNITS: 100 INJECTION, SOLUTION INTRAVENOUS; SUBCUTANEOUS at 01:22

## 2018-01-25 RX ADMIN — LEVOTHYROXINE SODIUM 50 MCG: 50 TABLET ORAL at 09:49

## 2018-01-25 ASSESSMENT — VISUAL ACUITY
OU: BLURRED VISION

## 2018-01-25 ASSESSMENT — ACTIVITIES OF DAILY LIVING (ADL)
ADLS_ACUITY_SCORE: 31
ADLS_ACUITY_SCORE: 30
ADLS_ACUITY_SCORE: 29

## 2018-01-25 NOTE — TELEPHONE ENCOUNTER
TC to Naomi, Shantelle is back to baseline mentally, adjusted her medications, adjusted her insulin, psych consult today, they are trying to figure out discharge plans for Shantelle and both her and her  are resisting going to a TCU, long discussion about plan of care moving forward and recommendations from the primary team.  We are recommending a short TCU stay to stabilize and increase strength to get up in a WC, Naomi asking for me to reach out to the  about my recommendation and she will talk to Shantelle, we will reconnect after those conversations.    TC to Keshawn to discuss plan of care moving, he feels now that the medications have been adjusted he can manage her in the home, feels that the new physician they are working with is supportive of her going home, and did a good job explaining the plan moving forward, still worried about going to a TCU due to the bad experience they have had, feels PT actually made the things worse when she was there not better, discussed options for TCU, and if not supportive then increased support at home is needed, increased RN support for medications and increased PT support for conditioning.     TC to Naomi RN, explained my conversation with Keshawn and what he is agreeing to, home discharge with closer [facility] RN supervision for medications and sx and increased PT conditioning, Naomi states that Shantelle is also not supportive of going to a TCU and wants to go home.  Discussed plan moving forward.  We will do a phone visit next week with the family and [facility] RN, Naomi will review the meds with Keshawn as he will need to be in charge until [facility] can make a visit tomorrow     Klaudia MTZ
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
## 2018-01-25 NOTE — PLAN OF CARE
Problem: Patient Care Overview  Goal: Plan of Care/Patient Progress Review  Discharge Planner SLP   Patient plan for discharge: Patient did not state.  Current status: Patient was seen for swallow treatment during a meal. She was observed to have various textures of purees and semi-solids. Prolonged mastication and thorough mastication was noted with good oral clearance. Patient intermittently drank thin liquids from the straw and she was observed to take small sips independently. No overt s/sx of aspiration. Recommend: 1) DDL3 with thin liquids 2) upright with assist during and following meals, straws ok, small sips/bites, check for oral residue, and slow rate of eating/feeding. If coughing noted with thins change back to nectar. Pills in apple sauce. 3) SLP will assess tolerance of regular diet.  Barriers to return to prior living situation: Deconditioning   Recommendations for discharge: Home with home health and speech services  Rationale for recommendations: May need 1-2 follow-up sessions at home in order to assess diet tolerance and educate family/caregivers.        Entered by: Elida Browne 01/25/2018 11:02 AM
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
## 2018-01-25 NOTE — DISCHARGE INSTRUCTIONS
You are started on new medicine called Keppra 1000 mg , take 2 times a day to prevent seizure , you will also be getting MRI of the brain in few weeks and then will see neurologist for follow up and review the results of your MRI.  Some of your medicines which can cause confusion and seizures are stopped :  Please do not take tramadol ( ultram ) and trileptal any more .  Only take Baclofen on the day of urinary catheter insertion to help the spasm but avoid taking it every day .  Also your Gabapentin dose is decreased to 600 mg two times a day from 900 mg two times a day.  For the next week, only take Novolog coverage as per sliding scale but do not take any Lantus as you did not require any Lantus during the hospitalization , once your blood sugars as high as 180 and you are requiring novolog with each meal, probably you can be started back on Lantus slowly like 5 units every night and then go up on it slowly as per your family doctor recommendations.    Correction Scale - LOW INSULIN RESISTANCE DOSING     Do Not give Correction Insulin if BG less than 140.  For  - 239 give 1 unit.  For  - 339 give 2 units.  For BG  340 - 439  give 3 units  For BG greater than or equal to 440 give 4 units    You are also given the letter to bring with you to the Er if needed regarding rico catheter replacement.    Understanding Seizures    What is a seizure?    A sudden is a sudden, brief change in electrical activity in your brain. Seizures can be caused by injury to the brain, scar formation, tumors, stroke or infection. Often the cause is not known. With treatment, most people lead normal lives.      What is the treatment for seizures?    The main treatment for seizures are anti-epileptic drugs (AEDs). These drugs greatly reduce or prevent seizures in most people. Tips for taking your medicines:    Never stop taking your seizure medicine without talking to your doctor. Do not stop your drugs because seizures have  stopped.    Do not skip a dose. This can change how well the drug works.    If you miss a dose, take it as soon as you remember. If it is within a few hours of your next dose, skip the dose you forgot, and take your next dose. After that, go back to the normal schedule.    If you miss more than one dose, talk to your doctor or nurse.    NEVER take double doses.    Get refills ahead of time before you run out.    Store your medicines in a dry place. Keep medicines in the bottle that they came in. Throw away all used bottles.    Keep a list of the medicines you take.    Don't take herbal supplements or antacids without talking with your doctor first. And ask your pharmacist about taking drugstore medicines.    If you need help remembering to take your medicines, use a pillbox.    Ask family, friends or coworkers to remind you when it is time to take a dose.    Your blood levels will need to be tested to be sure you are getting the right dose. Your doctor will tell you when you should be tested.    If you have any side effects or another seizure, your doctor may need to adjust the dose or change the medicines.      What are the side effects of AED medicines?    You may have: dizziness, trouble thinking, tiredness, stomach upset, weight gain or loss, depression or allergic reaction (such as rash or fever).    If this is a new drug or your dose has changed, the side effects should go away. Always talk to your doctor about any side effects. Your doctor can suggest ways to manage them.      What can I do to take care of myself?    Your lifestyle  Keep to a schedule as much as you can. Get plenty of sleep. Try to manage your stress.  Most people with seizures can lead an active life. Plan ahead for activities that carry some risk. You should:    Always swim with a  or brandon present.    Wear a helmet for biking, skiing and similar sports.    Ask your doctor about contact sports.    Avoid activities where having a  seizure would put you or someone else in danger.    Safe activities include jogging, aerobics, cross-country skiing, dancing, hiking, bowling and tennis.    It is a good idea to wear a medical alert bracelet. Tell family members, friends and co-workers about your seizure disorder.    Your home  Be sure your home is set up to be safe.    Keep your bathroom and bedroom doors unlocked. Do not block these doors.    Take showers only. In a bath, you risk drowning during a seizure.    Replace all glass doors with safety glass or plastic.    Fill your plate or bowl near the stove instead of carrying all the food to the table.    When cooking, turn the pot and pan handles towards the back of the stove.    Driving your car is generally safe and legal once seizures are under control. [place] state law says that a person must not drive for 3 months if a seizure is resulted in loss of consciousness.    Brain alerting chemicals  Many chemicals can cause seizures. Even small amounts of alcohol can trigger seizures. Illegal drugs can also cause severe seizures, even in people who do not normally have them.      How should family and friends respond to a seizure?    There is no way to stop a seizure. Family and caregivers can try to make sure you are safe.    When a seizure starts, family or caregivers should:    Keep you from falling.    Loosen tight clothing, especially around the neck.    Cushion the head.    Clear the area of furniture and sharp objects.    Turn you on your side. If you vomit, this helps prevent inhaling vomit.    Stay with you until you recover or medical help arrives.    Take your pulse and watch your rate of breathing, if possible.    Seizures typically last less than 3 minutes. Afterward, the person may be tired, confused and achy. HE or she may need to sleep for several hours to recover.    Call 911 if:    A seizure lasts more than 5 minutes.    This is the first seizure ever.    Another seizure starts  before awareness returns after a prior seizure.    The person had a seizure in water.    The person is pregnant, injured or has diabetes.    The person does not have a medical ID bracelet instructing what to do.    The person does not wake up or behave normally.    The seizure is different than the usual seizure.      When someone has a seizure DO NOT    Place anything between the person's teeth (inculding your fingers).    Try to hold the person down    Give the person something to eat or drink until he or she is fully awake and alert.    Move the person unless they are in danger or near a hazard.    Start CPR unless the seizure has stopped and the person is not breathing or has no pulse.    Try to stop the person from convulsing. They have no control over the seizure.      When should I call my doctor?  Call if you have:    Seizures more often. Especially if they have been under control for a long time.    Weakness, problems with seeing or new problems with balance.    Unusual behavior.    Side effects from medicines that are not going away or are getting worse.      Coping at Home    Seizures affect those around you, too. Talk with your loved ones and learn their concerns.    People with seizures can hold many kinds of jobs. But here are some issues to keep in mind.    Your work needs to be safe. How well controlled are your seizures? Does the job involve tasks that may not be safe for you, such as driving or using heavy machinery?    You may want to tell your boss or co-workers about your seizures. This is your choice. But it may be safer if people at your workplace are prepared to respond to a seizure. If you are concerned about losing your job, know your rights. The Americans with Disabilities Act provides work related protections for people with epilepsy.    Know the signs of depression. Depression can affect your thoughts and feelings. It can be caused by trouble coping with seizures. Call your doctor if you  are having problems with this.    Resources    Epilepsy foundation: www.epilepsyfoundationmn.org  Epilepsy.com: www.epilepsy.com  National [place] of Neurological Disorders and Stroke: www.ninds.nih.gov  KidsHealth.org: www.kidshealth.org  The [place] Equal Opportunity Commission: www.eeoc.giv/facts/epilepsy.html   Phone: 719.920.8238

## 2018-01-25 NOTE — PLAN OF CARE
Problem: Patient Care Overview  Goal: Plan of Care/Patient Progress Review  Outcome: Improving  A&Ox4, no paranoia noticed today. Neuros intact except baseline BLE 1/5 strength & neuropathy in all extremities (baseline). VSS. Tele NSR. DD3 thin, improved  appetite.  Up with lift. C/o buttocks pain, relief with repositioning. Skin breakdown on coccyx--WOC saw pt--recommended barrier cream & try to leave YIMI at times--no pressure injury noted. BS active, 1 small soft stool. No seizure activity noted. Plan for psych to evaluate pt for vulnerable adult. Discharge pending home care vs TCU?

## 2018-01-25 NOTE — PROGRESS NOTES
[facility]    Hospitalist Progress Note    Assessment & Plan   Gem Jade is a 72 year old female with past medical history of neurogenic bladder with indwelling rico cath, recurrent UTI including ESBL klebsiella, ESRD s/p renal transplant in 1999 with chronic immunosuppressive therapy, HTN, hyperlipidemia, and recurrent encephalopathy of unclear etiology with recent hospital admissions in 12/2017 and 1/2018 due to altered mental status presumed to be infectious related to recurrent UTI who was admitted to the intensivist service on 1/18/18 after being found unresponsive at home.She was intubated due to inability to protect airway, neuro ICU was consulted initially and empirically treated with versed/keppra trial with clinical improvement. Patient has since returned to baseline and extubated 1/21. Hospitalist service was contacted to assume medical care. MRI/Naheed he done on January 13 showed normal enhancement posterior parietal and occipital lobes. EEG showed no clear epileptiform patent however given clinical history she was a started on Versed/Keppra trial inpatient showed significant improvement while being on Keppra. Due to polypharmacy also complaining some control her baclofen, gabapentin, Trileptal and home tramadol was also held. As patient has showed clinical improvement,  she is started on low-dose Neurontin 600 mg PO BID as compared to 900 mg ( PTA dose ) .patient told me that she has not been taking baclofen routinely and only uses it for spasm before the catheter change. As patient is a started on Keppra she is in agreement on staying off Trileptal and tramadol at this point. We also discussed about her low need of insulin while she is in the hospital and we discussed about discharging her on low-dose insulin as compared to what she was taking before if hypoglycemia might be contributing to her encephalopathy.Psych is posted to evaluate patient decision making capacity as [facility] has  concerns regarding patient being vulnerable adult.       Recurrent encephalopathy:  Etiology remains unclear, presently appears to most likely be multifactorial in form of toxic and non-convulsive status epilepticus. Polypharmacy might also be playing role   BG preadmission 306, UA abnormal with negative culture. Patient with recent admission 1/11-1/15 for similar presentation without clear etiology, improved with supportive cares and treatment of underlying UTI. Neuro ICU evaluated patient initially , MRI/MRA 1/19 with abnormal enhancement at posterior parietal & occipital lobes. EEG without clear epileptiform pattern, however given clinical history was started on empiric Versed / Keppra trial with patient becoming more alert. Notably, polypharmacy likely playing role as pt is on baclofen, gabapentin, trileptal, and tramadol PTA.     -- Long discussion with patient regarding the overall concern , she was alert, awake and oriented and was able to tell me which type of rico catheter she is recommended to have to prevent UTI.  -- she is in agreement to stay on low dose neurontin 600 mg po BID rather than 900 mg BOD  -- She told me that she is not using the Baclofen routinely and only uses it PRN mostly before her urinary catheter change   -- D/C tramadol given possible seizure activity  -- Continue Keppra at 1000mg BID  -- She is also in agreement to stay off Trileptal , as she is going to be on keppra.  --  Will need repeat MRI with contrast in 4 weeks and neurology follow-up  -- psych is also posted to evaluate patient for decision making capacity as [facility] was concerned about patient being vulnerable adult being dependent on her  for the medication administration     Diarrhea: RN and patient reporting loose stools, with recent treatment for UTI in form of meropenem/keflex. C.Diff PCR came back negative   -- Imodium PRN      Acute hypoxic respiratory failure, secondary to acute encephalopathy, resolved    Patient is not requiring oxygen at baseline according to note review. Noted to be hypoxic to 85% prehospital. Required intubation from 1/18-1/21 for airway protection. Currently saturating well on RA after mental status improved.    Diabetes mellitus type 2, with a history of gastroparesis, retinopathy, polyneuropathy: Most recent A1c 6.3% on 1/12/18.  measures BG values before meals, reports recent excellent control. On recent admission with suspected preadmission hypoglycemia. Lantus insulin held without need for additional correction, resumed at discharge. PTA medications held once again, values have been well controlled past 24h without requiring correction. Some question if patient would benefit from decreased or discontinued Lantus dosing at discharge, as transient hypoglycemia may be contributing to encephalopathy.     -- Holding PTA Lantus 24u qHS, Novolog 10u BID; may benefit from Holding Lantus at discharge for few days and covering her only with Novolog , patient is in agreement , once she starts to have high blood sugars , then Lantus can be added back slowly  -- SSI  -- Mod CHO diet when appropriate      Neurogenic bladder with indwelling rico  History of recurrent UTIs:  Patient with hx of indwelling rico. 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  With recent hospitalizations 12/2017 due to sepsis due to UTI and 1/2018 with UTI. Most recent UCx with pan-sensitive E.coli treated with ceftriaxone and transitioned to PO Keflex at discharge on 1/15. Admission UA abnormal, UCx with Candida glabrata. Pt has been afebrile without leukocytosis and negative procalcitonin since admission.  - Follow fever curve , need to keep in mind that patients with chronic rico catheter also have chronic colonization and patient should be assessed for further signs and symptoms of infection also rather than only relying on UA results when deciding to treat her with  antibiotics.    Chronic renal failure, stage 3-4, status post renal transplant in 1999, with chronic immunosuppression, as well as hyperparathyroidism:  Initial creatinine is 0.99.  The patient has been followed by nephrology in the past.  She is maintained on prednisone and tacrolimus.  Most recent tacrolimus level 7.6 on 1/21, admission value 5.6.  - Continue tacrolimus and steroids, pharmacy monitoring levels PRN      Hypertension  Hyperlipidemia  - Continue prior to admission aspirin 81 mg daily, clonidine, labetalol and statin  - PRN IV labetalol for SBP >180      Anemia of chronic disease, baseline 10-12  Admission Hgb 10.9, stable.  - Monitor      Physical deconditioning  Secondary to physical debility, polyneuropathy, and history of Charcot foot.  - PT consult and SW for dispo planning,       History of bilateral pulmonary emboli, postop after knee replacement in the past  This is elucidated upon chart review, listed in past medical history.      Hypothyroidism  Patient maintained levothyroxine prior to admission. TSH on admission 1.31.  - Continue prior to admission levothyroxine    DVT Prophylaxis: Heparin SQ    Code Status: Full Code    Disposition: Expected discharge today once patient is seen by psych and if disposition arrangements are made, she will need HHC on discharge.  She is recommended to have few more sessions of speech therapy after the discharge but PT and OT felt patient was at baseline.    Sammi Lai MD, FACP    Interval History   Patient was seen & examined, she is feeling better today and once again we had long conversation with me regarding all the medications and ongoing plan.  She told me that she benefited most from complex patient care program which ended in November .  She told me that her HHC RN has recommended [place] rico catheter but every times she comes to the hospital she gets the different catheter which might be contributing to her recurrent UTIs.she is in agreement  with the idea of carrying the letter with her regarding rico catheter.  Once again she is open to the idea of keeping her off some of the medications which can contribute to the confusion.    -Data reviewed today: I reviewed all new labs and imaging results over the last 24 hours. I personally reviewed no images or EKG's today.    Physical Exam   Temp: 98  F (36.7  C) Temp src: Oral BP: 141/56   Heart Rate: 66 Resp: 16 SpO2: 98 % O2 Device: None (Room air)    Vitals:    01/23/18 0500 01/24/18 0605 01/25/18 0500   Weight: 103.4 kg (227 lb 15.3 oz) 108 kg (238 lb 1.6 oz) 105.9 kg (233 lb 7.5 oz)     Vital Signs with Ranges  Temp:  [98  F (36.7  C)-98.7  F (37.1  C)] 98  F (36.7  C)  Heart Rate:  [66-80] 66  Resp:  [16-18] 16  BP: (136-152)/(52-73) 141/56  SpO2:  [92 %-98 %] 98 %  I/O last 3 completed shifts:  In: 150 [P.O.:150]  Out: 800 [Urine:800]    GEN: well-developed, well-nourished, appears comfortable  HEENT: NCAT, PERRL, EOM intact bilaterally, sclera clear, conjunctiva normal, nose & mouth patent, mucous membranes moist  PULM: lungs CTA bilaterally, no increased work of breathing, no wheeze, rales, rhonchi  CV: RRR, S1 & S2, no murmur  GI: soft, nontender, nondistended, +BS in all 4 quadrants  MSK: full AROM bilateral UE/LE, pedal & radial pulses 2+ bilaterally  NEURO: awake, alert, oriented to person, place, time; follows commands, appropriate  SKIN: warm & dry without rash    Medications       gabapentin  600 mg Oral BID     levETIRAcetam  1,000 mg Oral BID     pantoprazole  40 mg Oral QAM     tacrolimus  2 mg Oral Q12H     labetalol  150 mg Oral Q12H HO     insulin aspart  1-4 Units Subcutaneous Q4H     heparin  5,000 Units Subcutaneous Q8H     aspirin  81 mg Oral or Feeding Tube Daily     cloNIDine  0.1 mg Oral or Feeding Tube BID     [START ON 2/8/2018] cyanocobalamin  1,000 mcg Intramuscular Q30 Days     levothyroxine  50 mcg Oral or Feeding Tube QAM AC     predniSONE  5 mg Oral or Feeding Tube Daily        Data     Recent Labs  Lab 01/24/18  0858 01/22/18  0458 01/21/18  1217  01/20/18  0720 01/20/18  0631   WBC  --  5.1 7.0  --   --  7.1   HGB  --  9.9* 10.9*  --   --  11.3*   MCV  --  93 94  --   --  92    138* 149*  < >  --  150    141 141  --  143 Canceled, Test credited   POTASSIUM 5.1 4.1 3.7  --  3.4 Canceled, Test credited   CHLORIDE 107 111* 107  --  110* Canceled, Test credited   CO2 19* 21 23  --  23 Canceled, Test credited   BUN 18 15 16  --  14 Canceled, Test credited   CR 1.11* 0.87 1.04  --  0.90 Canceled, Test credited   ANIONGAP 9 9 11  --  10 Canceled, Test credited   ROGER 8.1* 7.9* 8.2*  --  8.6 Canceled, Test credited   * 81 99  --  116* Canceled, Test credited   ALBUMIN  --   --   --   --  2.6* Canceled, Test credited   PROTTOTAL  --   --   --   --  7.1 Canceled, Test credited   BILITOTAL  --   --   --   --  0.4 Canceled, Test credited   ALKPHOS  --   --   --   --  106 Canceled, Test credited   ALT  --   --   --   --  20 Canceled, Test credited   AST  --   --   --   --  22 Canceled, Test credited   < > = values in this interval not displayed.    No results found for this or any previous visit (from the past 24 hour(s)).

## 2018-01-25 NOTE — PLAN OF CARE
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 Skin bruised on both arms, abdomen, scabs on scalp, \"sunburn\" appearance on coccyx with barrier cream applied, +2edema BLEs. Dysphagia level 3, pills crushed in pudding. No evidence of seizure activity. Skin tear noted in right groin, barrier cream applied. Possible vulnerable adult which effects discharge planning.        "

## 2018-01-25 NOTE — PROGRESS NOTES
Patient is seen and examined, feeling better, we went over plan and medication changes in detail for discharge , awaiting to be seen by psychiatry .  Tentative discharge later in the day once she is seen by psych , discussed with bedside RN and care coordinator .detailed note to follow later    [facility] medicine

## 2018-01-25 NOTE — PLAN OF CARE
Problem: Patient Care Overview  Goal: Plan of Care/Patient Progress Review  Outcome: Improving  A&Ox4. Baseline numbness in BUE, n/t in BLE. Hemiparesis in BLE. VSS. Tele NSR. DD3/thin liquid diet. Up with A2/lift. Redness on coccyx area, YIMI- wound care orders followed. T&R q2h. Denies pain. Johnson in place, patent. Discharge plan pending. Continue to monitor.

## 2018-01-25 NOTE — CONSULTS
[facility] Initial Psychiatric Consult Note      TIME SPENT IN PSYCHIATRY INITIAL CONSULT: 55 MINUTES    Consult ordered by: Sammi Lai MD.  Reason: Concern for decisional capacity.     Initial History     The patient's care was discussed, patient seen and chart notes were reviewed.    Patient examined for psychiatric consultation.     IDENTIFICATION    Pt is a 72 year old  [place]eng[place] American female currently living in [place]. Pt sees PCP Marivel Macias. Pt seen on  for decisional capacity.    HISTORY OF PRESENT ILLNESS    Ms. Gem Jade is a 72 year old female with an extensive and complex medical history, including kidney transplant with end-stage renal disease in 1999, recurrent UTIs, and Type 2 diabetes mellitus. She is on a number of medications. She was recently admitted on 12/19/17 and 1/11/18 for acute UTI and encephalopathy. She was intubated and stabilized on ICU. She was recently transferred to . MRI and EEG were unremarkable. The etiology for her recurrent encephalopathies remains unclear. On direct interview with me, she reports that she decompensates quickly when she returns home, resulting in subsequent hospital admission. Denies suicidal or homicidal ideation. She is oriented x3. She reports her mood is stable on her medications. She denies symptoms of anxiety or nicolasa. She is able to hold an appropriate conversation and is aware that she is on many medications.    CHEMICAL DEPENDENCY HISTORY    None per chart review.    PAST PSYCHIATRIC HISTORY    She has been seen by Psychiatry here twice in 2012 and 2014. In 2014, she had a similar presentation with altered mental status secondary to UTI. Past medication trials per chart review include Prozac and Ativan    FAMILY HISTORY    None per chart review.    SOCIAL HISTORY    Per chart review, pt lives with her .  This is her second ,  for a number of years.  Has 1 biological child and 1  adopted child from [place].  Is originally from [place].  Studied up to high school, was a homemaker.  Her  works as a .  Currently she is retired.      Medications     Prescriptions Prior to Admission   Medication Sig Dispense Refill Last Dose     TACROLIMUS PO Take 2.5 mg by mouth 2 times daily   1/18/2018 at 0000     VITAMIN D, CHOLECALCIFEROL, PO Take 4,000 Units by mouth 2 times daily   unk     baclofen (LIORESAL) 10 MG tablet Take 1 tablet (10 mg) by mouth 3 times daily as needed for muscle spasms 90 tablet 4 1/18/2018 at Unknown time     lidocaine (XYLOCAINE) 2 % topical gel Apply topically as needed for moderate pain 10 ML every 3 weeks 30 mL 11 unk     levothyroxine (SYNTHROID/LEVOTHROID) 50 MCG tablet TAKE ONE TABLET BY MOUTH EVERY DAY 90 tablet 3 unk     diphenoxylate-atropine (LOMOTIL) 2.5-0.025 MG per tablet Take 1 tablet by mouth 4 times daily as needed for diarrhea 30 tablet 0 unk     cloNIDine (CATAPRES) 0.1 MG tablet TAKE ONE TABLET BY MOUTH TWICE A  tablet 3 unk     predniSONE (DELTASONE) 5 MG tablet Take 1 tablet (5 mg) by mouth daily 90 tablet 3 unk     insulin glargine (LANTUS SOLOSTAR) 100 UNIT/ML injection Inject 24 units under the skin daily. (Patient taking differently: Inject 24 Units Subcutaneous At Bedtime Inject 24 units under the skin daily.) 15 mL 11 unk     insulin aspart (NOVOLOG FLEXPEN) 100 UNIT/ML injection Inject 10 units under the skin 2 times daily before meals as directed. Add sliding scale as needed. Avg use 25 units per day (Patient taking differently: Inject 10 units under the skin 2 times daily before meals as directed. Add sliding scale as needed.  2 units for every 50 over 201.  Avg use 25 units per day) 15 mL 11 unk     labetalol (NORMODYNE) 300 MG tablet Take 150 mg by mouth 2 times daily Take 1/2 tablet (150mg) =300mg. Hold for systolic BP less than 120. 120 tablet 3 unk     loratadine (CLARITIN) 10 MG tablet Take 1 tablet (10 mg) by  mouth daily 90 tablet 3 unk     pravastatin (PRAVACHOL) 10 MG tablet TAKE ONE TABLET BY MOUTH EVERY DAY 90 tablet 3 unk     aspirin 81 MG chewable tablet Take 1 tablet (81 mg) by mouth daily Starting 1 week after your kidney biopsy 90 tablet 3 unk     cyanocobalamin (VITAMIN B12) 1000 MCG/ML injection Inject 1 mL (1,000 mcg) into the muscle every 30 days 3 mL 3 1/11/2018     simethicone (MYLICON) 125 MG CHEW Take 1 tablet (125 mg) by mouth as needed for intestinal gas 120 tablet  unk     Lactobacillus Rhamnosus, GG, (CULTURELL) capsule Take 1 capsule by mouth 2 times daily 60 capsule 11 unk     Cranberry 400 MG TABS Take 400 mg by mouth 2 times daily   unk     famotidine (PEPCID) 20 MG tablet Take 1 tablet (20 mg) by mouth 2 times daily 60 tablet 1 unk     ACETAMINOPHEN PO Take 325-650 mg by mouth every 4 hours as needed.   unk     [DISCONTINUED] cephALEXin (KEFLEX) 500 MG capsule Take 1 capsule (500 mg) by mouth 4 times daily for 7 days 28 capsule 0 1/18/2018 at Unknown time     [DISCONTINUED] guaiFENesin (ROBITUSSIN) 100 MG/5ML SYRP Take 10-20 mLs by mouth every 4 hours as needed for cough 560 mL 3 unk     [DISCONTINUED] meclizine (ANTIVERT) 12.5 MG tablet Take 1 tablet (12.5 mg) by mouth 3 times daily as needed for dizziness 30 tablet 0 unk     order for DME High back guerrero wheel chair  MICHELLE 99  DX: morbid obesity, generalized weakness, physical deconditioning, chronic vertigo 1 Device 0 Taking     blood glucose monitoring (ACCU-CHEK COMPACT PLUS) test strip Use to test blood sugar 3 times daily or as directed.  Ok to substitute alternative if insurance prefers. 100 strip prn Taking     [DISCONTINUED] OXcarbazepine (TRILEPTAL) 300 MG tablet Take 1 tablet (300 mg) by mouth 2 times daily For pain 180 tablet 3 unk     order for DME Equipment being ordered: Silver Impregnated catheters  HX of frequent UTI 12 each 1 Taking     [DISCONTINUED] TraMADol HCl 50 MG TBDP Take 50 mg by mouth 3 times daily as needed 90 tablet  3 1/18/2018 at Unknown time     order for DME Equipment being ordered: Ahuja FX CPAP interface (nasal pillows), size XS. 1 each 1 Taking     insulin pen needle 30G X 8 MM Use as directed with Lantus 100 each 3 Taking     [DISCONTINUED] ondansetron (ZOFRAN ODT) 4 MG disintegrating tablet Take 1-2 tablets (4-8 mg) by mouth every 8 hours as needed for nausea 20 tablet 5 unk     order for DME Equipment being ordered: Blue Chux  Please send to [place] [id number] pad 3 Taking     ORDER FOR DME Equipment being ordered: Johnson Catheters - 18 french 3 catheter PRN Taking       Scheduled Medications    gabapentin  600 mg Oral BID     levETIRAcetam  1,000 mg Oral BID     pantoprazole  40 mg Oral QAM     tacrolimus  2 mg Oral Q12H     labetalol  150 mg Oral Q12H HO     insulin aspart  1-4 Units Subcutaneous Q4H     heparin  5,000 Units Subcutaneous Q8H     aspirin  81 mg Oral or Feeding Tube Daily     cloNIDine  0.1 mg Oral or Feeding Tube BID     [START ON 2/8/2018] cyanocobalamin  1,000 mcg Intramuscular Q30 Days     levothyroxine  50 mcg Oral or Feeding Tube QAM AC     predniSONE  5 mg Oral or Feeding Tube Daily     PRNs:  acetaminophen, loperamide, glucose **OR** dextrose **OR** glucagon, naloxone, ondansetron **OR** ondansetron, senna-docusate **OR** senna-docusate, potassium chloride, potassium chloride, potassium chloride, potassium chloride with lidocaine, potassium chloride, magnesium sulfate, sodium phosphate, sodium phosphate, sodium phosphate, labetalol, hydrALAZINE      Allergies        Allergies   Allergen Reactions     Bactrim Ds [Sulfamethoxazole W/Trimethoprim]      Creatinine level increased     Atorvastatin Calcium      myalgias, muscle aches     Codeine Nausea and Vomiting     Darvocet [Propoxyphene N-Apap] Nausea and Vomiting     Hydromorphone      Levofloxacin Other (See Comments) and Diarrhea     hallucinations     Morphine      Nausea and vomiting     Niaspan [Niacin] GI Disturbance     Flushing even at  "low dose, GI upset     Percocet [Oxycodone-Acetaminophen]      Vomiting after taking med couple of times     Vicodin [Hydrocodone-Acetaminophen] Nausea and Vomiting        Previous Medical History     Past Medical History:   Diagnosis Date     Background diabetic retinopathy(362.01)      Diabetic gastroparesis (H)      Diarrhea      Dizziness and giddiness      Hyponatremia 12/16/11     Kidney replaced by transplant      Mixed hyperlipidemia      Obesity      Osteopenia 11/07     Other and unspecified hyperlipidemia      Other pulmonary embolism and infarction 3/03     Polyneuropathy in diabetes(357.2)      Primary localized osteoarthrosis, lower leg      Pyelonephritis, unspecified      Type 2 diabetes mellitus with complications (H)      Urinary tract infection, site not specified      UTI (urinary tract infection) due to urinary indwelling catheter (H)         Medical Review of Systems     /56 (BP Location: Right arm)  Pulse 80  Temp 98  F (36.7  C) (Oral)  Resp 16  Ht 1.676 m (5' 6\")  Wt 105.9 kg (233 lb 7.5 oz)  SpO2 98%  BMI 37.68 kg/m2  Body mass index is 37.68 kg/(m^2).    Previous 10-point ROS completed by Jeannette Hurtado MD on 12/19/17 reviewed by Dimitry Barney MD on December 22, 2017 and is unchanged except for those problems mentioned within the HPI.      Mental Status Examination     Appearance Lying in bed, dressed in gown. Appears stated age.   Attitude Cooperative, pleasant   Orientation Oriented x 3   Eye Contact Good   Speech Normal rate, regular prosody   Language Normal   Psychomotor Behavior Normal   Thought Process Intact   Associations Intact   Thought Content Patient is currently negative for suicidal ideation, negative for plan or intent, able to contract no self harm and identify barriers to suicide.  Negative for obsessions, compulsions or psychosis.      Mood Bright   Affect Broad range   Fund of Knowledge Average   Insight Fair   Judgement Fair   Attention Span & " Concentration Alert   Recent & Remote Memory Impaired   Gait Not assessed   Muscle Tone Weak      Labs     Labs reviewed.  Recent Results (from the past 24 hour(s))   Glucose by meter    Collection Time: 01/24/18  5:07 PM   Result Value Ref Range    Glucose 203 (H) 70 - 99 mg/dL   Glucose by meter    Collection Time: 01/24/18  8:54 PM   Result Value Ref Range    Glucose 194 (H) 70 - 99 mg/dL   Glucose by meter    Collection Time: 01/25/18  1:21 AM   Result Value Ref Range    Glucose 156 (H) 70 - 99 mg/dL   Glucose by meter    Collection Time: 01/25/18  4:46 AM   Result Value Ref Range    Glucose 155 (H) 70 - 99 mg/dL   Glucose by meter    Collection Time: 01/25/18  9:23 AM   Result Value Ref Range    Glucose 175 (H) 70 - 99 mg/dL        Impression     This is a 72 year old female with a history of recurrent UTIs who presents after an episode of unresponsiveness at home. She was subsequently stabilized and ready for discharge. She is eliciting to have home health care at her home to help oversee her care. At this point, pt is deemed to have the capacity to consent. I would agree with home health care on discharge.     Diagnoses      1. 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  2. History of recurrent encephalopathy.      Plan     1. Explained side effects, benefits and complications of medications to the patient.  2. Medication Changes: Continue current medications.  3. Discussed treatment plan with patient and team.  4. Pt cleared to discharge per Psychiatry.  5. Pt to discharge to home with [facility].      TIME SPENT IN PSYCHIATRY INITIAL CONSULT: 55 MINUTES     Attestation:   Patient has been seen and evaluated by me, Dimitry Barney MD.    Patient ID:  Name: Gem Jade  MRN: [id number]  Admission: 12/19/2017  YOB: 1945

## 2018-01-25 NOTE — DISCHARGE SUMMARY
"[facility]    Discharge Summary  Hospitalist    Date of Admission:  1/18/2018  Date of Discharge:  1/25/2018  Discharging Provider: Sammi Lai MD,FACP    Discharge Diagnoses     Active Problems:    Encephalopathy acute, most likely metabolic , resolved now.    Acute hypoxic resp failure , sec to encephalopathy, resolved    Vancomycin resistant Enterococcus UTI    Altered mental state, resolved now    UTI (urinary tract infection), chronic rico catheter    Sepsis (H)    Diarrhea    Diabetes Mellitus type 2  With associated gastroparesis, retinopathy and polyneuropathy.    Neurogenic Bladder with indwelling rico    Chronic renal disease status post renal transplant in 1991 with chronic immunosuppression   Hyperparathyroidism sec to renal disease    Hypertension    Hyperlipidemia    Anemia of chronic disease    History of Bilateral pulmonary emboli    Hypothyroidism      History of Present Illness   Per Admitting MD: Ms. Gem Jade is a 72 yof with a history of neurogenic bladder with chronic indwelling rico, recurrent UTIs, and kidney transplant (1999) maintained on tacrolimus and 5 mg prednisone who was recently admitted to the ICU and [facility] hospital 1/11 through 1/15in the setting of possible status epilepticus (NCSE) requiring intubation.      Prior hospital course - \"Briefly, she was admitted to the hospital on  with altered mental status which was thought to be 2/2 hypoglycemia with glucose 28.  Her rico catheter was exchanged in the ED given dirty UA and long history of recurrent UTIs.  She was tested for influenza and found to be negative.  CT head was obtained and negative for acute pathology.  She was admitted to the floor and underwent ID work up and consult and well as neuro consult for persistently altered mental status.  Infection was felt to be unlikely due to normal WBC, normal procal and absence of fevers. Altered mental status was persistent, EEG was obtained and she was found " "to be status epilepticus necessitating transfer to the ICU for further cares.  On arrival to the ICU her eyes were open but she was not tracking.  She withdrew to nailbed pressure but was otherwise unresponsive.  The decision was made to intubate for airway protection.\"      She was treated for infection with meropenem and then ceftriaxone with improvement in MS and extubation and ultimately discharged 1/15.      Patient who lives at home was unresponsive by her  but delay in EMS call (per ED notes) Upon EMS arrival, the patient was responsive only to painful stimuli, but was breathing on her own. En route, the patient began having apneic episodes with an oxygen saturation lowest at 85%, so she was bagged. EMS tried 0.4 mg Narcan without change in mental status, and established an IV in her wrist.       She was intubated on arrival to ICU for GCS 9 but inability to protect airway. FAST exam negative, bedside ECHO with no overt abnormality. ABG was without hypercarbia UA was dirty thus presumption was encephalopathy 2/2 infection. She was bolused fluid, meropenem as well propofol and admitted to ICU     In ICU off propofol she is moving extremities spontaneously however not responsive to verbal or painful stimuli.       Hospital Course   Gem Jade is a 72 year old female with past medical history of neurogenic bladder with indwelling rico cath, recurrent UTI including ESBL klebsiella, ESRD s/p renal transplant in 1999 with chronic immunosuppressive therapy, HTN, hyperlipidemia, and recurrent encephalopathy of unclear etiology with recent hospital admissions in 12/2017 and 1/2018 due to altered mental status presumed to be infectious related to recurrent UTI who was admitted to the intensivist service on 1/18/18 after being found unresponsive at home.She was intubated due to inability to protect airway, neuro ICU was consulted initially and empirically treated with versed/keppra trial with clinical " improvement.Sepsis was rules out on admission and she did not have UTI causing sepsis during this admission .    Patient has since returned to baseline and extubated 1/21. Hospitalist service was contacted to assume medical care. MRI was done on January 13th showed some enhancement in posterior parietal and occipital lobes. EEG showed no clear epileptiform patent however given clinical history she was a started on Versed/Keppra trial inpatient showed significant improvement while being on Keppra. In my opinion , hypoglycemia ( patient did not require any Lantus during the hospitalization ) and possibly polypharmacy might be playing huge role in her recurrent presentation and hospitalizations.    During her hospitalization , her baclofen, gabapentin, Trileptal and home tramadol was also held. As patient has showed clinical improvement,  she is started on low-dose Neurontin 600 mg PO BID as compared to 900 mg ( PTA dose ) .patient told me that she has not been taking baclofen routinely and only uses it for spasm before the catheter change. As patient is a started on Keppra she is in agreement on staying off Trileptal and tramadol at this point. We also discussed about her low need of insulin while she is in the hospital and we discussed about discharging her on low-dose insulin as compared to what she was taking before if hypoglycemia might be contributing to her encephalopathy.Although lantus is not discontinued on her Mar, I have discussed it with patient and her  , that she should only be given novolog for the next few days after she reaches home and till she has the phone conversation or the visit from [facility] who can review her blood sugars and then under their guidance she should be started back on Lantus very slowly like 5 units daily.    Psych was posted to evaluate patient decision making capacity as [facility] had concerns regarding patient being vulnerable adult.patient was found competent to make decisions per  psych.    She is given these clear discharge instruction:    You are started on new medicine called Keppra 1000 mg , take 2 times a day to prevent seizure , you will also be getting MRI of the brain in few weeks and then will see neurologist for follow up and review the results of your MRI.  Some of your medicines which can cause confusion and seizures are stopped :  Please do not take tramadol ( ultram ) and trileptal any more .  Only take Baclofen on the day of urinary catheter insertion to help the spasm but avoid taking it every day .  Also your Gabapentin dose is decreased to 600 mg two times a day from 900 mg two times a day.  For the next week, only take Novolog coverage as per sliding scale but do not take any Lantus as you did not require any Lantus during the hospitalization , once your blood sugars as high as 180 and you are requiring novolog with each meal, probably you can be started back on Lantus slowly like 5 units every night and then go up on it slowly as per your family doctor recommendations.    Correction Scale - LOW INSULIN RESISTANCE DOSING     Do Not give Correction Insulin if BG less than 140.  For  - 239 give 1 unit.  For  - 339 give 2 units.  For BG  340 - 439  give 3 units  For BG greater than or equal to 440 give 4 units    You are also given the letter to bring with you to the Er if needed regarding rico catheter replacement.    MRI is ordered for her to be done in 4 weeks as per neurology recommendations and then she should be following up with the neurology.    Patient was seen and examined on the day of discharge , please review the progress note from this morning for further details regarding the exam from this morning .  Patient was seen and examined on the day of discharge , he is feeling well, does not have any complaints , I did review the discharge medications and instructions with the patient and plan for him to follow up with the PCP after the hospitalization  .patient was in agreement , he is discharged in stable condition back to his home.    Sammi Lai MD, FACP    Significant Results and Procedures   As below.    Pending Results   These results will be followed up by pcp    Unresulted Labs Ordered in the Past 30 Days of this Admission     Date and Time Order Name Status Description    1/24/2018 2300 Tacrolimus level In process     1/19/2018 0015 Baclofen level In process     1/19/2018 0015 Gabapentin level In process     1/19/2018 0015 Oxcarbazepine level In process           Code Status   Full Code       Primary Care Physician   Marivel Barcenas NP    Physical Exam   Temp: 98  F (36.7  C) Temp src: Oral BP: 141/56   Heart Rate: 66 Resp: 16 SpO2: 98 % O2 Device: None (Room air)    Vitals:    01/23/18 0500 01/24/18 0605 01/25/18 0500   Weight: 103.4 kg (227 lb 15.3 oz) 108 kg (238 lb 1.6 oz) 105.9 kg (233 lb 7.5 oz)     Vital Signs with Ranges  Temp:  [98  F (36.7  C)-98.7  F (37.1  C)] 98  F (36.7  C)  Heart Rate:  [66-80] 66  Resp:  [16-18] 16  BP: (136-152)/(52-73) 141/56  SpO2:  [92 %-98 %] 98 %  I/O last 3 completed shifts:  In: 150 [P.O.:150]  Out: 1600 [Urine:1600]    Constitutional: Awake, alert, cooperative, no apparent distress  Respiratory: Clear to auscultation bilaterally, no crackles or wheezing  Cardiovascular: Regular rate and rhythm, normal S1 and S2, and no murmur noted  GI: Normal bowel sounds, soft, non-distended, non-tender  Skin/Integumen: No rashes, no cyanosis, no edema      Discharge Disposition   Discharged to home  Condition at discharge: Stable    Consultations This Hospital Stay   NEUROLOGY IP CONSULT  RESPIRATORY CARE IP CONSULT  SOCIAL WORK IP CONSULT  PHARMACY IP CONSULT  SPEECH LANGUAGE PATH ADULT IP CONSULT  NUTRITION SERVICES ADULT IP CONSULT  PHYSICAL THERAPY ADULT IP CONSULT  OCCUPATIONAL THERAPY ADULT IP CONSULT  WOUND OSTOMY CONTINENCE NURSE  IP CONSULT  PSYCHIATRY IP CONSULT    Time Spent on this Encounter   Sammi LU  Joelle, personally saw the patient today and spent greater than 30 minutes discharging this patient.    Discharge Orders     MRI Brain w & w/o contrast     Home care nursing referral     Home Care SLP Referral for Hospital Discharge     Reason for your hospital stay   You were admitted to the hospital secondary to confusion and there was concern for possible seizure during your workup.     Follow-up and recommended labs and tests    Follow up with primary care provider, Marivel Barcenas NP, within 7 days to evaluate medication change and for hospital follow- up.  No follow up labs or test are needed.  Please also keep appointment with neurology in 4 weeks and you will also need repeated MRI of the brain before the appointment.     Activity   Your activity upon discharge: activity as tolerated     MD face to face encounter   Documentation of Face to Face and Certification for Home Health Services    I certify that patient: Gem Jade is under my care and that I, or a nurse practitioner or physician's assistant working with me, had a face-to-face encounter that meets the physician face-to-face encounter requirements with this patient on: 1/25/2018.    This encounter with the patient was in whole, or in part, for the following medical condition, which is the primary reason for home health care: debilitation, decreased mobilization , history of encephalopathy , complex medication regimen.    I certify that, based on my findings, the following services are medically necessary home health services: Nursing.    My clinical findings support the need for the above services because: Nurse is needed: For complex aftercare after hospitalization because the patient needs instruction and cannot perform care on their own due to: patient being blind and decreased mobility and need help with medication setup and instructions    Further, I certify that my clinical findings support that this patient is homebound (i.e.  absences from home require considerable and taxing effort and are for medical reasons or [employer] services or infrequently or of short duration when for other reasons) because: Patient is bedbound due to: paraplegia    Based on the above findings. I certify that this patient is confined to the home and needs intermittent skilled nursing care, physical therapy and/or speech therapy.  The patient is under my care, and I have initiated the establishment of the plan of care.  This patient will be followed by a physician who will periodically review the plan of care.  Physician/Provider to provide follow up care: Marivel Barcenas    Attending hospital physician (the Medicare certified [place] provider): Sammi Lai  Physician Signature: See electronic signature associated with these discharge orders.  Date: 1/25/2018     Discharge Instructions   You are started on new medicine called Keppra 1000 mg , take 2 times a day to prevent seizure , you will also be getting MRI of the brain in few weeks and then will see neurologist for follow up and review the results of your MRI.  Some of your medicines which can cause confusion and seizures are stopped :  Please do not take tramadol ( ultram ) and trileptal any more .  Only take Baclofen on the day of urinary catheter insertion to help the spasm but avoid taking it every day .  Also your Gabapentin dose is decreased to 600 mg two times a day from 900 mg two times a day.  For the next week, only take Novolog coverage as per sliding scale but do not take any Lantus as you did not require any Lantus during the hospitalization , once your blood sugars as high as 180 and you are requiring novolog with each meal, probably you can be started back on Lantus slowly like 5 units every night and then go up on it slowly as per your family doctor recommendations.  You are also given the letter to bring with you to the Er if needed regarding rico catheter replacement.     Full Code      Diet   Follow this diet upon discharge: Orders Placed This Encounter     Room Service     Snacks/Supplements Adult: Other - Please comment; Magic Shakes; Between Meals     Combination Diet Dysphagia Diet Level 3: Advanced; Thin Liquids (water, ice chips, juice, milk gelatin, ice cream, etc)       Discharge Medications   Current Discharge Medication List      START taking these medications    Details   levETIRAcetam 1000 MG TABS Take 1,000 mg by mouth 2 times daily  Qty: 60 tablet, Refills: 1    Associated Diagnoses: Encephalopathy acute; Status epilepticus (H)         CONTINUE these medications which have CHANGED    Details   gabapentin (NEURONTIN) 300 MG capsule Take 2 capsules (600 mg) by mouth 2 times daily For pain  Qty: 360 capsule, Refills: 3    Associated Diagnoses: Diabetic polyneuropathy associated with type 2 diabetes mellitus (H)         CONTINUE these medications which have NOT CHANGED    Details   TACROLIMUS PO Take 2.5 mg by mouth 2 times daily      VITAMIN D, CHOLECALCIFEROL, PO Take 4,000 Units by mouth 2 times daily      baclofen (LIORESAL) 10 MG tablet Take 1 tablet (10 mg) by mouth 3 times daily as needed for muscle spasms  Qty: 90 tablet, Refills: 4      lidocaine (XYLOCAINE) 2 % topical gel Apply topically as needed for moderate pain 10 ML every 3 weeks  Qty: 30 mL, Refills: 11    Associated Diagnoses: Johnson catheter in place      levothyroxine (SYNTHROID/LEVOTHROID) 50 MCG tablet TAKE ONE TABLET BY MOUTH EVERY DAY  Qty: 90 tablet, Refills: 3    Associated Diagnoses: Other specified hypothyroidism      diphenoxylate-atropine (LOMOTIL) 2.5-0.025 MG per tablet Take 1 tablet by mouth 4 times daily as needed for diarrhea  Qty: 30 tablet, Refills: 0    Associated Diagnoses: Diarrhea of presumed infectious origin      cloNIDine (CATAPRES) 0.1 MG tablet TAKE ONE TABLET BY MOUTH TWICE A DAY  Qty: 180 tablet, Refills: 3    Associated Diagnoses: HTN, goal below 140/90      predniSONE (DELTASONE) 5 MG  tablet Take 1 tablet (5 mg) by mouth daily  Qty: 90 tablet, Refills: 3    Associated Diagnoses: Kidney replaced by transplant      insulin glargine (LANTUS SOLOSTAR) 100 UNIT/ML injection Inject 24 units under the skin daily.  Qty: 15 mL, Refills: 11    Comments: Hold on file for refills  Associated Diagnoses: Type 2 diabetes mellitus with diabetic polyneuropathy, with long-term current use of insulin (H)      insulin aspart (NOVOLOG FLEXPEN) 100 UNIT/ML injection Inject 10 units under the skin 2 times daily before meals as directed. Add sliding scale as needed. Avg use 25 units per day  Qty: 15 mL, Refills: 11    Associated Diagnoses: Type 2 diabetes mellitus with diabetic polyneuropathy, with long-term current use of insulin (H)      labetalol (NORMODYNE) 300 MG tablet Take 150 mg by mouth 2 times daily Take 1/2 tablet (150mg) =300mg. Hold for systolic BP less than 120.  Qty: 120 tablet, Refills: 3    Associated Diagnoses: Benign essential hypertension; Kidney replaced by transplant      loratadine (CLARITIN) 10 MG tablet Take 1 tablet (10 mg) by mouth daily  Qty: 90 tablet, Refills: 3    Associated Diagnoses: Acute nasopharyngitis      pravastatin (PRAVACHOL) 10 MG tablet TAKE ONE TABLET BY MOUTH EVERY DAY  Qty: 90 tablet, Refills: 3    Associated Diagnoses: Hyperlipidemia LDL goal <100      aspirin 81 MG chewable tablet Take 1 tablet (81 mg) by mouth daily Starting 1 week after your kidney biopsy  Qty: 90 tablet, Refills: 3    Associated Diagnoses: Pulmonary embolism and infarction (H); Diabetes mellitus with background retinopathy (H)      cyanocobalamin (VITAMIN B12) 1000 MCG/ML injection Inject 1 mL (1,000 mcg) into the muscle every 30 days  Qty: 3 mL, Refills: 3    Associated Diagnoses: Iron deficiency anemia, unspecified iron deficiency anemia type      simethicone (MYLICON) 125 MG CHEW Take 1 tablet (125 mg) by mouth as needed for intestinal gas  Qty: 120 tablet      Lactobacillus Rhamnosus, GG,  (CULTURELL) capsule Take 1 capsule by mouth 2 times daily  Qty: 60 capsule, Refills: 11    Associated Diagnoses: Diarrhea      Cranberry 400 MG TABS Take 400 mg by mouth 2 times daily      famotidine (PEPCID) 20 MG tablet Take 1 tablet (20 mg) by mouth 2 times daily  Qty: 60 tablet, Refills: 1      ACETAMINOPHEN PO Take 325-650 mg by mouth every 4 hours as needed.      !! order for DME High back guerrero wheel chair  MICHELLE 99  DX: morbid obesity, generalized weakness, physical deconditioning, chronic vertigo  Qty: 1 Device, Refills: 0    Associated Diagnoses: Morbid obesity, unspecified obesity type (H); Vertigo; Generalized muscle weakness; Physical deconditioning      blood glucose monitoring (ACCU-CHEK COMPACT PLUS) test strip Use to test blood sugar 3 times daily or as directed.  Ok to substitute alternative if insurance prefers.  Qty: 100 strip, Refills: prn      !! order for DME Equipment being ordered: Silver Impregnated catheters  HX of frequent UTI  Qty: 12 each, Refills: 1    Associated Diagnoses: Recurrent UTI      !! order for DME Equipment being ordered: Ahuja FX CPAP interface (nasal pillows), size XS.  Qty: 1 each, Refills: 1    Associated Diagnoses: Obstructive sleep apnea syndrome      insulin pen needle 30G X 8 MM Use as directed with Lantus  Qty: 100 each, Refills: 3    Associated Diagnoses: Type 2 diabetes mellitus with diabetic nephropathy (H)      !! order for DME Equipment being ordered: Blue Chux  Please send to [facility]  Qty: 100 pad, Refills: 3    Associated Diagnoses: Fecal incontinence      !! ORDER FOR DME Equipment being ordered: Johnson Catheters - 18 french  Qty: 3 catheter, Refills: PRN    Associated Diagnoses: Johnson catheter status       !! - Potential duplicate medications found. Please discuss with provider.      STOP taking these medications       cephALEXin (KEFLEX) 500 MG capsule Comments:   Reason for Stopping:         guaiFENesin (ROBITUSSIN) 100 MG/5ML SYRP Comments:   Reason  for Stopping:         meclizine (ANTIVERT) 12.5 MG tablet Comments:   Reason for Stopping:         OXcarbazepine (TRILEPTAL) 300 MG tablet Comments:   Reason for Stopping:         TraMADol HCl 50 MG TBDP Comments:   Reason for Stopping:         ondansetron (ZOFRAN ODT) 4 MG disintegrating tablet Comments:   Reason for Stopping:             Allergies   Allergies   Allergen Reactions     Bactrim Ds [Sulfamethoxazole W/Trimethoprim]      Creatinine level increased     Atorvastatin Calcium      myalgias, muscle aches     Codeine Nausea and Vomiting     Darvocet [Propoxyphene N-Apap] Nausea and Vomiting     Hydromorphone      Levofloxacin Other (See Comments) and Diarrhea     hallucinations     Morphine      Nausea and vomiting     Niaspan [Niacin] GI Disturbance     Flushing even at low dose, GI upset     Percocet [Oxycodone-Acetaminophen]      Vomiting after taking med couple of times     Vicodin [Hydrocodone-Acetaminophen] Nausea and Vomiting     Data   Most Recent 3 CBC's:  Recent Labs   Lab Test  01/24/18   0858  01/22/18   0458  01/21/18   1217   01/20/18   0631   WBC   --   5.1  7.0   --   7.1   HGB   --   9.9*  10.9*   --   11.3*   MCV   --   93  94   --   92   PLT  162  138*  149*   < >  150    < > = values in this interval not displayed.      Most Recent 3 BMP's:  Recent Labs   Lab Test  01/24/18   0858  01/22/18   0458  01/21/18   1217   NA  135  141  141   POTASSIUM  5.1  4.1  3.7   CHLORIDE  107  111*  107   CO2  19*  21  23   BUN  18  15  16   CR  1.11*  0.87  1.04   ANIONGAP  9  9  11   ROGER  8.1*  7.9*  8.2*   GLC  107*  81  99     Most Recent 2 LFT's:  Recent Labs   Lab Test  01/20/18   0720  01/20/18   0631   AST  22  Canceled, Test credited   ALT  20  Canceled, Test credited   ALKPHOS  106  Canceled, Test credited   BILITOTAL  0.4  Canceled, Test credited     Most Recent INR's and Anticoagulation Dosing History:  Anticoagulation Dose History     Recent Dosing and Labs Latest Ref Rng & Units 8/31/2016  9/1/2016 9/2/2016 3/8/2017 4/26/2017 4/26/2017 11/6/2017    INR 0.86 - 1.14 0.98 1.13 1.09 1.01 1.06 1.03 1.09        Most Recent 3 Troponin's:  Recent Labs   Lab Test  11/13/17   1613  04/19/17   0005  01/10/16   1408   TROPI  <0.015  <0.015  The 99th percentile for upper reference range is 0.045 ug/L.  Troponin values in   the range of 0.045 - 0.120 ug/L may be associated with risks of adverse   clinical events.    <0.015  The 99th percentile for upper reference range is 0.045 ug/L.  Troponin values in   the range of 0.045 - 0.120 ug/L may be associated with risks of adverse   clinical events.       Most Recent Cholesterol Panel:  Recent Labs   Lab Test  12/05/16   1210   CHOL  164   LDL  64   HDL  46*   TRIG  272*     Most Recent 6 Bacteria Isolates From Any Culture (See EPIC Reports for Culture Details):  Recent Labs   Lab Test  01/18/18   1532  01/18/18   1445  01/18/18   1420  01/18/18   0754  01/11/18   1614  01/11/18   1530   CULT  No growth  >100,000 colonies/mL  Candida glabrata  Susceptibility testing not routinely done  *  No growth  Canceled, Test credited  Duplicate request  2 SETS OF BCS IN PROCESS    No growth  >100,000 colonies/mL  Escherichia coli  *  No growth     Most Recent TSH, T4 and A1c Labs:  Recent Labs   Lab Test  01/19/18   0445  01/12/18   0648   03/09/17   1310   TSH  1.31   --    < >  1.20   T4   --    --    --   0.72*   A1C   --   6.3*   < >   --     < > = values in this interval not displayed.     Results for orders placed or performed during the hospital encounter of 01/18/18   Head CT w/o contrast    Narrative    CT SCAN OF THE HEAD WITHOUT CONTRAST   1/18/2018 3:06 PM     HISTORY: Loss of consciousness.    TECHNIQUE:  Axial images of the head and coronal reformations without  IV contrast material. Radiation dose for this scan was reduced using  automated exposure control, adjustment of the mA and/or kV according  to patient size, or iterative reconstruction  technique.    COMPARISON: None.    FINDINGS:  There is generalized atrophy of the brain.  White matter  changes are present in the cerebral hemispheres that are consistent  with small vessel ischemic disease in this age patient. There is no  evidence of intracranial hemorrhage, mass, acute infarct or anomaly.  Mucosal thickening is present in the maxillary sinuses. Multiple  ethmoid sinuses are opacified. Mucosal thickening in the sphenoid  sinuses There is no evidence of trauma.      Impression    IMPRESSION: No acute pathology. No bleed, mass, or infarcts are seen.      NAYANA GONSALES MD   XR Chest Port 1 View    Narrative    CHEST PORTABLE ONE VIEW   1/18/2018 2:40 PM     HISTORY: Intubation.     COMPARISON: 1/12/2018.      Impression    IMPRESSION: New ET tube extends into the right mainstem bronchus.  Recommend retraction. The lungs remain aerated without evidence of  significant collapse. Cardiac silhouette borderline enlarged.    Results discussed with Armen Sauceda at 3:05 PM on 1/18/2018.    ANABELA GONGORA MD   Chest  XR, 1 view PORTABLE    Narrative    PORTABLE CHEST ONE VIEW   1/18/2018  4:40 PM     HISTORY: Reassess endotracheal tube and nasogastric tube location.    COMPARISON: Earlier the same day.      Impression    IMPRESSION: ET tube has been retracted and now projects over the mid  trachea. New enteric tube projects over the left upper quadrant.    No focal airspace opacity. No pneumothorax visualized. Cardiac  silhouette remains borderline-enlarged.    ANABELA GONGORA MD   CT Abdomen Pelvis w/o & w Contrast    Narrative    CT ABDOMEN AND PELVIS WITHOUT AND WITH CONTRAST   1/18/2018 10:12 PM     HISTORY: Abnormal urinalysis. Evaluate for pyelonephritis. History of  renal transplant.    TECHNIQUE: 112 mL Isovue -370 IV were administered. After contrast  administration, volumetric helical sections were acquired from the  lung bases to the ischial tuberosities. Coronal images were also  reconstructed.  Radiation dose for this scan was reduced using  automated exposure control, adjustment of the mA and/or kV according  to patient size, or iterative reconstruction technique.    COMPARISON: CT of the abdomen and pelvis performed 2/17/2017.     FINDINGS: A right lower quadrant transplanted kidney is noted. No  evidence for stones or hydronephrosis in the transplanted kidney. No  solid renal masses are identified. Atrophic native kidneys are noted  bilaterally. Multiple small nonobstructing stones are noted within the  native kidneys. No hydronephrosis in the native kidneys. An exophytic  1.1 cm lesion in the interpolar region of the right native kidney  anteriorly is of higher density than fluid, but does not appear to  enhance, and most likely represents a hemorrhagic or proteinaceous  renal cyst. This finding is unchanged since 2/17/2017. Johnson catheter  in the bladder. There is moderate diffuse bladder wall thickening, not  well defined given the presence of the Johnson catheter.    The gallbladder is not seen, and may be surgically absent. A bilobed  cystic lesion in the pancreatic body is not significantly changed  (series 4 image 30) measuring 3.3 x 2.4 cm. The liver, spleen, adrenal  glands, and pancreas are otherwise unremarkable. Mild intra and  extrahepatic biliary dilatation is unchanged, and may be related to  the patient's age and postcholecystectomy state. The common duct  measures up to 1.6 cm in diameter. Enteric tube is in place, with tip  near the gastric fundus. No bowel obstruction. Colonic diverticulosis,  without evidence for diverticulitis. Moderate atherosclerotic  aortoiliac calcification. Mild infiltrate and/or atelectasis at the  left lung base. Coronary artery calcification. Degenerative changes  throughout the thoracic spine.      Impression    IMPRESSION:   1. Right lower quadrant renal transplant is noted.   2. No evidence for pyelonephritis or urinary obstruction in the  transplanted  kidney.  3. Moderate diffuse bladder wall thickening is nonspecific, but could  be due to cystitis.  4. Probable hemorrhagic or proteinaceous cyst in the native right  kidney measures 1.1 cm.  5. Mild intra and extrahepatic biliary dilatation may be related to  the patient's age and postcholecystectomy state.  6. Mild infiltrate and/or atelectasis at the left lung base. Please  correlate for clinical evidence of pneumonia.  7. An indeterminate bilobed cystic lesion in the pancreatic body  measures 3.3 cm, and is unchanged. Pancreatic MRI is recommended for  further characterization.    SHUN RAMON MD   MR Brain w/o & w Contrast    Narrative    MRI OF THE BRAIN WITHOUT AND WITH CONTRAST 1/19/2018 9:50 AM     COMPARISON: Brain MRI 1/13/2018.    HISTORY:  Encephalopathy, immunosuppressed (kidney).      TECHNIQUE: Axial diffusion-weighted with ADC map, axial T2-weighted  with fat saturation, axial T1-weighted, axial turboFLAIR and coronal  T1-weighted images of the brain were acquired without intravenous  contrast.  Following intravenous administration of gadolinium (10 mL  Gadavist), axial T1-weighted images of the brain were acquired.     FINDINGS: There is new subtle abnormal T2 signal hyperintensity in a  few sulci at the high-posterior aspect of the left parietal lobe with  associated subtle abnormal contrast enhancement in the sulci at the  high-posterior aspect of the left parietal lobe and posterior aspect  of the left occipital lobe. This could represent an extra-axial  process such as inflammation or infection. There is no other abnormal  contrast enhancement in the brain or its coverings. Lumbar puncture  may be helpful for further evaluation.    There is moderate diffuse cerebral volume loss. There are patchy  periventricular areas of abnormal T2 signal hyperintensity in the  cerebral white matter bilaterally that are consistent with sequela of  chronic small vessel ischemic disease. The ventricles and  basal  cisterns are within normal limits in configuration given the degree of  cerebral volume loss.  There is no midline shift.  There are no  extra-axial fluid collections.  There is no evidence for stroke or  acute intracranial hemorrhage.    There is no mastoiditis. Air-fluid levels in the maxillary sinuses  bilaterally, sphenoid sinus and ethmoid air cells bilaterally again  noted, possibly representing acute sinusitis.      Impression    IMPRESSION:   1. Subtle abnormal T2 signal hyperintensity and abnormal contrast  enhancement in a few sulci at the posterior aspect of the left  parietal and occipital lobes. Lumbar puncture may be helpful to  evaluate for an infectious or inflammatory process.  2. Possible acute sinusitis again noted.  3. Diffuse cerebral volume loss and cerebral white matter changes  consistent with chronic small vessel ischemic disease.    SHAAN SEVILLA MD   MRA Angiogram Head w/o Contrast    Narrative    MR ANGIOGRAM OF THE HEAD WITHOUT CONTRAST   1/19/2018 9:04 AM     COMPARISON: None.    HISTORY:  Encephalopathy.    TECHNIQUE:  3D time-of-flight MR angiogram of the head without  contrast. MIP reconstruction of all MR angiographic data was  performed.    FINDINGS:  The bilateral distal internal carotid, basilar, bilateral  anterior cerebral, bilateral middle cerebral and bilateral posterior  cerebral arteries are patent and unremarkable with no evidence for  cerebral artery stenosis or aneurysm.       Impression    IMPRESSION:  Normal MR angiogram of the head.     SHAAN SEVILLA MD   MRA Angiogram Neck w/o & w Contrast    Narrative    MRA NECK WITHOUT AND WITH CONTRAST  1/19/2018 9:41 AM     COMPARISON: None    HISTORY: Encephalopathy.     TECHNIQUE: 2D time-of-flight MR angiogram of the neck without contrast  and 3D MR angiogram of the neck with 10 mL Gadavist gadolinium IV  contrast.  MIP reconstruction of all MR angiographic data was  performed. Estimates of carotid stenoses are made  relative to the  distal internal carotid artery diameters except as noted.    FINDINGS:    Carotids: The common carotid arteries bilaterally are widely patent.  There is severe atherosclerotic narrowing (likely greater than 70%  luminal diameter stenosis) at the origin of the right internal carotid  artery. There is severe atherosclerotic narrowing at the distal common  carotid artery on the left. There is irregularity in contour and mild  narrowing of the intracranial distal right internal carotid artery  between the petrous and cavernous segments. The cervical internal  carotid arteries bilaterally are otherwise patent with no additional  areas of stenosis.    Vertebrals: The vertebral arteries bilaterally are patent without  stenosis and demonstrate antegrade flow.    Aortic arch: The arteries as they arise from the aortic arch are  normally arranged with no evidence for stenosis.      Impression    IMPRESSION:  1. Severe atherosclerotic narrowing at the origin of the right  internal carotid artery (likely greater than 70% luminal diameter  stenosis).  2. Severe atherosclerotic narrowing of the distal left common carotid  artery.  3. Otherwise, normal neck MRA.    SHAAN SEVILLA MD     *Note: Due to a large number of results and/or encounters for the requested time period, some results have not been displayed. A complete set of results can be found in Results Review.

## 2018-01-25 NOTE — PROGRESS NOTES
"Discussed with Dr Lai.  She has talked with both pt and  and they are in agreement on discharge meds and dc home today with resumption of [facility] home care.  She still wants psych to complete their eval before pt discharges although pt has been very lucid the last couple of days.  Her paranoia seems to have resolved.  Per  home care liaison Sofía Jo, they are agreeable to accepting pt back and intend to utilize their home care manager for some intensive home care medication teaching and visits at least initially.  Met w/ pt to discuss dc planning and hospital f/u.  Her PCP is Marivel Barcenas NP with  [facility].  Pt has been getting home visits but the [facility] is closing 1/31 and they have been trying to set pt up with another PCP. Unfortunately she has not been able to get into a w/c to be transported to a clinic.  She is severely deconditioned, suffers from vertigo whenever they use their home christiana lift and has had N/V when moved into the w/c.  They are continuing to work on ways to get her mobile enough for clinic visits.  Her family moved her bed to a room downstairs where she has better access to getting outside by ramp.  They have also obtained a high back w/c that can be lowered flat when she develops nausea from the lift transfer so she doesn't have to go back to bed right away.  She hasn't tried it yet but is hopeful this will help. She really wants to be able to leave the house.  She states that she is very close to her  who is her caregiver.  Her vision is very poor and she depends on him for all her care.  They have been  for 26 years and she trusts him implicitly.  Discussed concerns that her  hadn't called 911 for her when she was unresponsive and she stated that she has frequently told her  NOT to call 911 as she \"hates to go to the hospital\".  Discussed that this could have life threatening consequences for her and that [place] [facility] " care is agreeing to take pt back but that they are going to talk to pt/husb about learning warning signs and insisting that  respond appropriately by calling 911 when warranted.  She expressed understanding.     Spoke w/ Marivel Barcenas by phone extensively to discuss discharge.  She stated that she had talked to  evening of 1/23 and that he was agreeable to TCU. Shared that this was surprising as he had called the unit 1/24 am very upset that TCU was being discussed.  Dr Lai had talked to him today and he was very happy with home care plan. Marivel also stated that she was planning on continuing as pt's PCP if pt could get to her clinic but would continue to follow by phone for a short period and MAY be able to make another home visit. Made plan for Marivel to call  while CC talked with pt about TCU.    Addendum:  Talked w/ pt that about concerns for med management at home, benefit of more therapy at TCU to help her be able to get out to clinic faster, etc.  Pt extremely reluctant to go but agreed to talk to her NP after she talked to pt's  if  agreed to TCU.    Spoke w/ Marivel NP who states that  is no longer agreeable to TCU.  She was able to convince him to give up control of pt's meds to home RN however which should help.  Marivel would like to set up a telephone visit when the  home care RN is there.  She will follow pt closely.  Contacted [facility] liaison and referral and gave them Marivel's office cell number to set this up soon.  Updated them on above.  Updated pt who is on board with plan.  She was on the phone with her  Keshawn so he is aware. Spoke w/ Dr Lai and updated her.  Requested SW set up stretcher ride for today. DC home w/ resumption of home care and home SLP added.

## 2018-01-25 NOTE — TELEPHONE ENCOUNTER
Incoming call from an RN care coordinator at [facility] requesting a call back from Klaudia to discuss pt's plan of care. Pt is currently hospitalized and will discharge tomorrow, but her spouse is refusing that pt gets transferred to a TCU. RN would like to discuss concerns with Klaudia, and come up with a more permanent solution post TCU.     Please follow up. Contact info: [phone], Naomi Molina

## 2018-01-26 ENCOUNTER — TELEPHONE (OUTPATIENT)
Dept: FAMILY MEDICINE | Facility: CLINIC | Age: 73
End: 2018-01-26

## 2018-01-26 LAB
10OH-CARBAZEPINE SERPL-MCNC: 11.8 UG/ML
BACLOFEN SERPL-MCNC: 171 NG/ML
GABAPENTIN SERPLBLD-MCNC: 32.4 UG/ML

## 2018-01-26 NOTE — TELEPHONE ENCOUNTER
Spoke with the patient's  who reports that after being off the gabapentin x 7 days (while an in patient), she began experiencing neuropathic pain on the evening of 1/25/18.  She restarted the gabapentin at that time and although pain is not as significant, it remains.  Would like PCP be aware.    TC to PCP who reinforced current dosing of 600 mg bid.  Have appointment with PCP and MTM on 1/30/18 and can f/u at that time.     Relayed recommendations to Keshawn and suggested he write down any questions or concerns that occur over this next weekend and discuss them with the providers on Tuesday. Patient's  voiced understanding and agreed to plan of care. Sandi Long RN January 26, 2018 4:16 PM

## 2018-01-26 NOTE — PLAN OF CARE
Problem: Patient Care Overview  Goal: Plan of Care/Patient Progress Review  Outcome: Adequate for Discharge Date Met: 01/25/18  A&Ox4, no forgetfulness or paranoia. Neuros intact except baseline poor vision, BLE 1/5, neuropathy in all exremeties. VSS. Tele NSR. DD3 thin diet. Up with Lift. Denies pain. Discharged home with discharge paperwork, letter regarding rico,keppra, & belongings.

## 2018-01-26 NOTE — PLAN OF CARE
Problem: Patient Care Overview  Goal: Plan of Care/Patient Progress Review  Speech Language Therapy Discharge Summary    Reason for therapy discharge:    Discharged to home with home therapy.    Progress towards therapy goal(s). See goals on Care Plan in [facility] electronic health record for goal details.  Goals partially met.  Barriers to achieving goals:   discharge from facility.    Therapy recommendation(s):    Continued therapy is recommended.  Rationale/Recommendations:  1-2 f/u sessions with Home health ST to insure diet tolerance and family/patient education.

## 2018-01-26 NOTE — TELEPHONE ENCOUNTER
Incoming call from Chang (pt spouse), requesting to speak directly to a nurse about pt's neuropathy pain and her Gabapentin.  Chang contact info: [phone]      Rich Henderson

## 2018-01-26 NOTE — PROGRESS NOTES
Discharge instructions reviewed with patient. Call then placed to , Chang, & spent 25mins reviewing changes in her medications & orders. Told him that her hard copy of the letter regarding the rico was sent with pt & is in a packet of discharge paperwork. Educated him the letter. Chang very appreciative of update & clarified new medications, changes in meds, & new orders. Told him that I highlighted the medications on the med sheet w/ paperwork that still needed to be given tonight. Keppra sent with patient.

## 2018-01-30 ENCOUNTER — VIRTUAL VISIT (OUTPATIENT)
Dept: FAMILY MEDICINE | Facility: CLINIC | Age: 73
End: 2018-01-30
Payer: COMMERCIAL

## 2018-01-30 ENCOUNTER — ALLIED HEALTH/NURSE VISIT (OUTPATIENT)
Dept: PHARMACY | Facility: CLINIC | Age: 73
End: 2018-01-30
Payer: COMMERCIAL

## 2018-01-30 DIAGNOSIS — Z79.4 TYPE 2 DIABETES MELLITUS WITH HYPERGLYCEMIA, WITH LONG-TERM CURRENT USE OF INSULIN (H): ICD-10-CM

## 2018-01-30 DIAGNOSIS — E11.65 TYPE 2 DIABETES MELLITUS WITH HYPERGLYCEMIA, WITH LONG-TERM CURRENT USE OF INSULIN (H): ICD-10-CM

## 2018-01-30 DIAGNOSIS — E11.9 DIABETES MELLITUS, TYPE 2 (H): Primary | ICD-10-CM

## 2018-01-30 DIAGNOSIS — I10 BENIGN ESSENTIAL HYPERTENSION: ICD-10-CM

## 2018-01-30 DIAGNOSIS — R56.9 SEIZURES (H): ICD-10-CM

## 2018-01-30 DIAGNOSIS — E11.42 TYPE 2 DIABETES MELLITUS WITH DIABETIC POLYNEUROPATHY, WITH LONG-TERM CURRENT USE OF INSULIN (H): ICD-10-CM

## 2018-01-30 DIAGNOSIS — Z94.0 KIDNEY REPLACED BY TRANSPLANT: ICD-10-CM

## 2018-01-30 DIAGNOSIS — E11.42 DIABETIC POLYNEUROPATHY ASSOCIATED WITH TYPE 2 DIABETES MELLITUS (H): ICD-10-CM

## 2018-01-30 DIAGNOSIS — Z79.4 TYPE 2 DIABETES MELLITUS WITH DIABETIC POLYNEUROPATHY, WITH LONG-TERM CURRENT USE OF INSULIN (H): ICD-10-CM

## 2018-01-30 DIAGNOSIS — Z09 HOSPITAL DISCHARGE FOLLOW-UP: Primary | ICD-10-CM

## 2018-01-30 DIAGNOSIS — N39.0 RECURRENT UTI: ICD-10-CM

## 2018-01-30 PROCEDURE — 98968 PH1 ASSMT&MGMT NQHP 21-30: CPT | Performed by: NURSE PRACTITIONER

## 2018-01-30 PROCEDURE — G0179 MD RECERTIFICATION HHA PT: HCPCS

## 2018-01-30 PROCEDURE — 99607 MTMS BY PHARM ADDL 15 MIN: CPT | Performed by: PHARMACIST

## 2018-01-30 PROCEDURE — 99606 MTMS BY PHARM EST 15 MIN: CPT | Performed by: PHARMACIST

## 2018-01-30 NOTE — Clinical Note
Please call her [facility]  and review med changes with her, our expectation is that they are setting up meds and checking them, I would like nurse visits 2x a week, I am also interested in restarting PT for anthony for conditioning and getting up in her WC

## 2018-01-30 NOTE — MR AVS SNAPSHOT
After Visit Summary   1/30/2018    Gem Jade    MRN: [id number]           Patient Information     Date Of Birth          1945        Visit Information        Provider Department      1/30/2018 1:30 PM Conchita Toledo, [facility]        Today's Diagnoses     Diabetes mellitus, type 2 (H)    -  1    Type 2 diabetes mellitus with diabetic polyneuropathy, with long-term current use of insulin (H)        Kidney replaced by transplant        Diabetic polyneuropathy associated with type 2 diabetes mellitus (H)        Seizures (H)        Type 2 diabetes mellitus with hyperglycemia, with long-term current use of insulin (H)          Care Instructions    See AVS from PCP encounter for details           Follow-ups after your visit        Your next 10 appointments already scheduled     Feb 05, 2018  3:30 PM CST   [place] [facility] [place]    [address] [place] [id number] [phone]           Note: this is not an onsite visit; there is no need to come to the facility.            Feb 06, 2018  3:30 PM CST   TELEMEDICINE with Conchita Toledo [facility] ([facility])    [address] [place] [id number] [phone]           Note: this is not an onsite visit; there is no need to come to the facility.              Who to contact     If you have questions or need follow up information about today's clinic visit or your schedule please contact [facility] directly at [phone].  Normal or non-critical lab and imaging results will be communicated to you by MyChart, letter or phone within 4 business days after the clinic has received the results. If you do not hear from us within 7  days, please contact the clinic through [employer] or phone. If you have a critical or abnormal lab result, we will notify you by phone as soon as possible.  Submit refill requests through [employer] or call your pharmacy and they will forward the refill request to us. Please allow 3 business days for your refill to be completed.          Additional Information About Your Visit        [employer] Information     [employer] gives you secure access to your electronic health record. If you see a primary care provider, you can also send messages to your care team and make appointments. If you have questions, please call your primary care clinic.  If you do not have a primary care provider, please call [phone] and they will assist you.        Care EveryWhere ID     This is your Care EveryWhere ID. This could be used by other organizations to access your [facility] medical records  [id number]         Blood Pressure from Last 3 Encounters:   01/25/18 137/63   01/15/18 156/70   12/25/17 134/51    Weight from Last 3 Encounters:   01/25/18 233 lb 7.5 oz (105.9 kg)   01/14/18 226 lb 14.4 oz (102.9 kg)   12/23/17 238 lb (108 kg)              Today, you had the following     No orders found for display         Today's Medication Changes          These changes are accurate as of 1/30/18  3:06 PM.  If you have any questions, ask your nurse or doctor.               These medicines have changed or have updated prescriptions.        Dose/Directions    LANTUS SOLOSTAR 100 UNIT/ML injection   This may have changed:    - how much to take  - how to take this  - when to take this  - additional instructions   Used for:  Type 2 diabetes mellitus with diabetic polyneuropathy, with long-term current use of insulin (H)   Generic drug:  insulin glargine   Changed by:  Conchita Toledo [facility]        Dose:  5 Units   Inject 5 Units Subcutaneous daily   Quantity:  15 mL   Refills:  11       NovoLOG FLEXPEN 100 UNIT/ML injection   This may have  changed:  additional instructions   Used for:  Type 2 diabetes mellitus with diabetic polyneuropathy, with long-term current use of insulin (H)   Generic drug:  insulin aspart   Changed by:  Conchita Toledo [facility]        Inject 2-8u sliding scale with meals and 1-4u sliding scale at bedtime   Quantity:  15 mL   Refills:  11                Primary Care Provider Office Phone # Fax #    Marivel Barcenas, APRN [facility] [phone]       [address] [facility] [address]  [facility] [address]        Equal Access to Services     [facility]: Hadii aad ku hadasho Soomaali, waaxda luqadaha, qaybta kaalmada adeegyada, waxay idiin hayaan adeeg kharacarlitos lajunior . So [facility] [phone].    ATENCIÓN: Si habla español, tiene a peguero disposición servicios gratuitos de asistencia lingüística. Ll[facility] [phone].    We comply with applicable federal civil rights laws and [place] laws. We do not discriminate on the basis of race, color, national origin, age, disability, sex, sexual orientation, or gender identity.            Thank you!     Thank you for choosing [facility]  for your care. Our goal is always to provide you with excellent care. Hearing back from our patients is one way we can continue to improve our services. Please take a few minutes to complete the written survey that you may receive in the mail after your visit with us. Thank you!             Your Updated Medication List - Protect others around you: Learn how to safely use, store and throw away your medicines at www.disposemymeds.org.          This list is accurate as of 1/30/18  3:06 PM.  Always use your most recent med list.                   Brand Name Dispense Instructions for use Diagnosis    ACCU-CHEK COMPACT PLUS test strip   Generic drug:  blood glucose monitoring     100 strip    Use to test blood sugar 3 times daily or as directed.  Ok to substitute alternative if insurance prefers.        ACETAMINOPHEN PO       Take 325-650 mg by mouth every 4 hours as needed.        aspirin 81 MG chewable tablet     90 tablet    Take 1 tablet (81 mg) by mouth daily Starting 1 week after your kidney biopsy    Pulmonary embolism and infarction (H), Diabetes mellitus with background retinopathy (H)       baclofen 10 MG tablet    LIORESAL    90 tablet    Take 1 tablet (10 mg) by mouth 3 times daily as needed for muscle spasms        cloNIDine 0.1 MG tablet    CATAPRES    180 tablet    TAKE ONE TABLET BY MOUTH TWICE A DAY    HTN, goal below 140/90       Cranberry 400 MG Tabs      Take 400 mg by mouth 2 times daily        cyanocobalamin 1000 MCG/ML injection    VITAMIN B12    3 mL    Inject 1 mL (1,000 mcg) into the muscle every 30 days    Iron deficiency anemia, unspecified iron deficiency anemia type       diphenoxylate-atropine 2.5-0.025 MG per tablet    LOMOTIL    30 tablet    Take 1 tablet by mouth 4 times daily as needed for diarrhea    Diarrhea of presumed infectious origin       famotidine 20 MG tablet    PEPCID    60 tablet    Take 1 tablet (20 mg) by mouth 2 times daily        gabapentin 300 MG capsule    NEURONTIN    360 capsule    Take 2 capsules (600 mg) by mouth 2 times daily For pain    Diabetic polyneuropathy associated with type 2 diabetes mellitus (H)       insulin pen needle 30G X 8 MM     100 each    Use as directed with [place]    Type 2 diabetes mellitus with diabetic nephropathy (H)       labetalol 300 MG tablet    NORMODYNE    120 tablet    Take 150 mg by mouth 2 times daily Take 1/2 tablet (150mg) =300mg. Hold for systolic BP less than 120.    Benign essential hypertension, Kidney replaced by transplant       lactobacillus rhamnosus (GG) capsule     60 capsule    Take 1 capsule by mouth 2 times daily    Diarrhea       LANTUS SOLOSTAR 100 UNIT/ML injection   Generic drug:  insulin glargine     15 mL    Inject 5 Units Subcutaneous daily    Type 2 diabetes mellitus with diabetic polyneuropathy, with long-term current  use of insulin (H)       levETIRAcetam 1000 MG Tabs     60 tablet    Take 1,000 mg by mouth 2 times daily    Encephalopathy acute, Status epilepticus (H)       levothyroxine 50 MCG tablet    SYNTHROID/LEVOTHROID    90 tablet    TAKE ONE TABLET BY MOUTH EVERY DAY    Other specified hypothyroidism       lidocaine 2 % topical gel    XYLOCAINE    30 mL    Apply topically as needed for moderate pain 10 ML every 3 weeks    Johnson catheter in place       loratadine 10 MG tablet    CLARITIN    90 tablet    Take 1 tablet (10 mg) by mouth daily    Acute nasopharyngitis       NovoLOG FLEXPEN 100 UNIT/ML injection   Generic drug:  insulin aspart     15 mL    Inject 2-8u sliding scale with meals and 1-4u sliding scale at bedtime    Type 2 diabetes mellitus with diabetic polyneuropathy, with long-term current use of insulin (H)       order for DME     3 catheter    Equipment being ordered: Johnson Catheters - 18 french    Johnson catheter status       * order for DME     100 pad    Equipment being ordered: Blue Chux Please send to [facility]    Fecal incontinence       * order for DME     1 each    Equipment being ordered: Ahuja FX CPAP interface (nasal pillows), size XS.    Obstructive sleep apnea syndrome       * order for DME     12 each    Equipment being ordered: Silver Impregnated catheters HX of frequent UTI    Recurrent UTI       * order for DME     1 Device    High back guerrero wheel chair MICHELLE 99 DX: morbid obesity, generalized weakness, physical deconditioning, chronic vertigo    Morbid obesity, unspecified obesity type (H), Vertigo, Generalized muscle weakness, Physical deconditioning       pravastatin 10 MG tablet    PRAVACHOL    90 tablet    TAKE ONE TABLET BY MOUTH EVERY DAY    Hyperlipidemia LDL goal <100       predniSONE 5 MG tablet    DELTASONE    90 tablet    Take 1 tablet (5 mg) by mouth daily    Kidney replaced by transplant       simethicone 125 MG Chew chewable tablet    MYLICON    120 tablet    Take 1 tablet  (125 mg) by mouth as needed for intestinal gas        TACROLIMUS PO      Take 2.5 mg by mouth 2 times daily        VITAMIN D (CHOLECALCIFEROL) PO      Take 4,000 Units by mouth 2 times daily        * Notice:  This list has 4 medication(s) that are the same as other medications prescribed for you. Read the directions carefully, and ask your doctor or other care provider to review them with you.

## 2018-01-30 NOTE — PROGRESS NOTES
"  Gem Jade is a 72 year old female who is being evaluated via a billable telephone visit.      The patient has been notified of following:     \"This telephone visit will be conducted via a call between you and your physician/provider. We have found that certain health care needs can be provided without the need for a physical exam.  This service lets us provide the care you need with a short phone conversation.  If a prescription is necessary we can send it directly to your pharmacy.  If lab work is needed we can place an order for that and you can then stop by our lab to have the test done at a later time.    We will bill your insurance company for this service.  Please check with your medical insurance if this type of visit is covered. You may be responsible for the cost of this type of visit if insurance coverage is denied.  The typical cost is $30 (10min), $59 (11-20min) and $85 (21-30min).  Most often these visits are shorter than 10 minutes.    If during the course of the call the physician/provider feels a telephone visit is not appropriate, you will not be charged for this service.\"       Consent has been obtained for this service by care team member: yes. See the scanned image in the medical record.  SUBJECTIVE:     Gem Jade complains of    Chief Complaint   Patient presents with     Hospital F/U       I have reviewed and updated the patient's Past Medical History, Social History, Family History and Medication List.    ALLERGIES  Bactrim ds [sulfamethoxazole w/trimethoprim]; Atorvastatin calcium; Codeine; Darvocet [propoxyphene n-apap]; Hydromorphone; Levofloxacin; Morphine; Niaspan [niacin]; Percocet [oxycodone-acetaminophen]; and Vicodin [hydrocodone-acetaminophen]    Thalia Cazraes MA      Hospital Follow-up Visit:    Hospital/Nursing Home/IP Rehab Facility: [facility]  Date of Admission: 1/11/2018  Date of Discharge: 1/15/18  Reason(s) for Admission: UTI, metabolic " encephalopathy             Problems taking medications regularly:  Still need [facility] nurse to set up meds, doing sliding scale insulin but sugars have been high 200-400,        Medication changes since discharge: discussed restarting Lantus at 5mg daily, and increasing sliding scale for her insulin, modified diet,        Problems adhering to non-medication therapy:  Need [facility] RN to set up meds  Summary of hospitalization:  [facility] discharge summary reviewed  Diagnostic Tests/Treatments reviewed.  Follow up needed: labs outstanding tacrolimus  Other Healthcare Providers Involved in Patient s Care:         Homecare and MTM  Update since discharge: improved. No confusion, sugars have been high so ok with restarting Lantus, and increasing her sliding scale, ok with not increasing gabapentin,    Post Discharge Medication Reconciliation: discharge medications reconciled and changed, per note/orders (see AVS).  Plan of care communicated with patient, family and caregiver     Coding guidelines for this visit:  Type of Medical   Decision Making Face-to-Face Visit       within 7 Days of discharge Face-to-Face Visit        within 14 days of discharge   Moderate Complexity [id number]   High Complexity [id number]           [facility]     Discharge Summary  Hospitalist     Date of Admission:  1/11/2018  Date of Discharge:  1/15/2018  Discharging Provider: Victoriano Arellano DO        Discharge Diagnoses      1. Catheter associated UTI, acute on chronic  2. Metabolic encephalopathy 2/2 UTI and possibly hypoglycemia  3. Chronic renal failure, stage 3-4, s/p renal transplant in 1999 with chronic immunosuppression   4. DM type II  5. Hypertension   6. Hyperlipidemia  7. Anemia of chronic disease  8. Hypothyroidism        History of Present Illness      Gem Jade is a 72 year old female who presented with AMS.  Patient was recently discharged on December 25, 2017 due to sepsis and urinary tract  infection at that time.  The patient was on vancomycin and meropenem during that admission initially, and then subsequently consolidated to meropenem during her inpatient stay.  She has neurogenic bladder, requiring a chronic indwelling Rico catheter and a history of Klebsiella, ESBL, VRE, Citrobacter freundii, and candiduria.  Infectious disease has contemplated her clinical scenario and decided against chronic suppressive antibiotic therapy, reportedly, due to the risk of acquisition of Clostridium difficile infection.  Of note, chart review reveals EMS finding of low blood sugar upon initial evaluation of the patient.    Hospital Course     Gem Jade was admitted on 1/11/2018.  The following problems were addressed during her hospitalization:  Urinary tract infection, cathter associated  Patient with hx of indwelling rico. 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  Most recent hospital admission in 12/2017 due to sepsis due to UTI.  Empirically started on Meropenem due to abnormal UA after presenting with AMS pending culture results.  Urine culture grew E. Coli that was pan-sensitive.  Patient was initially placed on Meropenem and this was changed to Ceftriaxone on 1/13 based on culture results.  ID was consulted and patient was switched to Keflex at discharge.      Acute metabolic encephalopathy, secondary to urinary tract infection,catheter associated and possible hypoglycemia   Presented with AMS. She was noted to have BG of 28 by EMS. In the ED, UA was obtained which was abnormal and rico was exchanged. Work up in the ED, influenza was negative.  A CT of the head was completed showing no acute intracranial hemorrhage, midline shift, or acute infarct, stable-appearing atrophy changes, small fluid at the bilateral maxillary sinuses, along with ethmoid sinus mucosal thickening, per report.  Lactic acid was also normal at 0.7 on admission. Initially there was concern for  hypoglycemia as the cause but remained altered despite improved BG and treatment of UTI.  EEG was obtained which was initially concerning for status epileptics. She was transferred to ICU on 1/12 and intubated for airway protection. EEG monitoring per neurology report showed encephalopathy but no seizure activity, thus felt AMS to be due to metabolic rather than seizure. Did not recommend AED. MRI was negative for acute abnormality.  Patient gradually improved with tx of UTI and was successfully extubated on 1/13.  Her mental status has cleared.    During her hospitalization , her baclofen, gabapentin, Trileptal and home tramadol was also held. As patient has showed clinical improvement,  she is started on low-dose Neurontin 600 mg PO BID as compared to 900 mg ( PTA dose ) .patient told me that she has not been taking baclofen routinely and only uses it for spasm before the catheter change. As patient is a started on Keppra she is in agreement on staying off Trileptal and tramadol at this point. We also discussed about her low need of insulin while she is in the hospital and we discussed about discharging her on low-dose insulin as compared to what she was taking before if hypoglycemia might be contributing to her encephalopathy.Although lantus is not discontinued on her Mar, I have discussed it with patient and her  , that she should only be given novolog for the next few days after she reaches home and till she has the phone conversation or the visit from [facility] who can review her blood sugars and then under their guidance she should be started back on Lantus very slowly like 5 units daily.     Psych was posted to evaluate patient decision making capacity as [facility] had concerns regarding patient being vulnerable adult.patient was found competent to make decisions per psych.     She is given these clear discharge instruction:     You are started on new medicine called Keppra 1000 mg , take 2 times a day to prevent  seizure , you will also be getting MRI of the brain in few weeks and then will see neurologist for follow up and review the results of your MRI.  Some of your medicines which can cause confusion and seizures are stopped :  Please do not take tramadol ( ultram ) and trileptal any more .  Only take Baclofen on the day of urinary catheter insertion to help the spasm but avoid taking it every day .  Also your Gabapentin dose is decreased to 600 mg two times a day from 900 mg two times a day.  For the next week, only take Novolog coverage as per sliding scale but do not take any Lantus as you did not require any Lantus during the hospitalization , once your blood sugars as high as 180 and you are requiring novolog with each meal, probably you can be started back on Lantus slowly like 5 units every night and then go up on it slowly as per your family doctor recommendations.     Correction Scale - LOW INSULIN RESISTANCE DOSING     Do Not give Correction Insulin if BG less than 140.  For  - 239 give 1 unit.  For  - 339 give 2 units.  For BG  340 - 439  give 3 units  For BG greater than or equal to 440 give 4 units     STOP taking these medications         cephALEXin (KEFLEX) 500 MG capsule Comments:   Reason for Stopping:            guaiFENesin (ROBITUSSIN) 100 MG/5ML SYRP Comments:   Reason for Stopping:            meclizine (ANTIVERT) 12.5 MG tablet Comments:   Reason for Stopping:            OXcarbazepine (TRILEPTAL) 300 MG tablet Comments:   Reason for Stopping:            TraMADol HCl 50 MG TBDP Comments:   Reason for Stopping:            ondansetron (ZOFRAN ODT) 4 MG disintegrating tablet Comments:   Reason for Stopping:              Diabetes Follow-up    Patient is checking blood sugars: 3-4x daily.    Blood sugar testing frequency justification: On insulin, frequency appropriate , using sliding scale 3x daily as directed by the hospital, discussed restarting Lantus today at 5 mg   Results are as  follows:         lunchtime - 280-400         bedtime - 280-400    Diabetic concerns: None and blood sugar frequently over 200     Symptoms of hypoglycemia (low blood sugar): none, since coming back from the hospital     Paresthesias (numbness or burning in feet) or sores: Yes gabapentin reduced, trileptal stopped and baclofen only before caths, neuropathy has increased for one day, but now back at baseline      Date of last diabetic eye exam: unsure     BP Readings from Last 2 Encounters:   01/25/18 137/63   01/15/18 156/70     Hemoglobin A1C (%)   Date Value   01/12/2018 6.3 (H)   12/19/2017 6.7 (H)     LDL Cholesterol Calculated (mg/dL)   Date Value   12/05/2016 64   02/05/2015 18     Hypertension Follow-up      Outpatient blood pressures are being checked at home.  Results are 136/88 taken by [facility], Moving forward only [facility] will be taking her BP, will look at changing antihypertensives at next visit, off beta blocker      Low Salt Diet: no added salt      Amount of exercise or physical activity: None    Problems taking medications regularly: Yes,   administrating them and needs help, we will be having [facility] setting up and checking on meds      Medication side effects: none    Diet: diabetic      OBJECTIVE:      (Z09) Hospital discharge follow-up  (primary encounter diagnosis)  Comment: stable, improved cognition, medications being adm appropriately  Plan: we will contact your [facility] nurse with med changes     (N39.0) Recurrent UTI  Comment: no sx, cath changes by [facility]  Plan: monitor    (E11.42) Diabetic polyneuropathy associated with type 2 diabetes mellitus (H)  Comment: worsen RBG, neuropathy at baseline even with decreasing gabapentin and stopping tramadol and trileptal   Plan: Will restart lantus and increase sliding scale, see PharmD note     (I10) Benign essential hypertension  Comment: stable   Plan: no med changes     Patient Instructions   We have increased your lantus to 5mg daily    We have  increased your Novolog sliding scale    No other med changes at this time     We will call you next week to follow up    I have reviewed the note as documented above.  This accurately captures the substance of my conversation with the patient,  Klaudia MTZ      Total time of call between patient and provider was 30 minutes

## 2018-01-30 NOTE — PATIENT INSTRUCTIONS
We have increased your lantus to 5mg daily    We have increased your Novolog sliding scale    No other med changes at this time     We will call you next week to follow up

## 2018-01-30 NOTE — MR AVS SNAPSHOT
After Visit Summary   1/30/2018    Gem Jade    MRN: [id number]           Patient Information     Date Of Birth          1945        Visit Information        Provider Department      1/30/2018 1:00 PM Marivel Barcenas APRN CNP [facility] [place]        Today's Diagnoses     Hospital discharge follow-up    -  1    Recurrent UTI        Diabetic polyneuropathy associated with type 2 diabetes mellitus (H)        Benign essential hypertension          Care Instructions    We have increased your lantus to 5mg daily    We have increased your Novolog sliding scale    No other med changes at this time     We will call you next week to follow up           Follow-ups after your visit        Follow-up notes from your care team     Return in about 1 week (around 2/6/2018).      Your next 10 appointments already scheduled     Feb 05, 2018  3:30 PM CST   Telephone Visit with ANTHONY Antonio CNP   [facility] [place] ([facility] [place])    [address] [place] [id number] [phone]           Note: this is not an onsite visit; there is no need to come to the facility.            Feb 06, 2018  3:30 PM CST   TELEMEDICINE with Conchita Toledo [facility] ([facility] [place]    [address] [place] [id number] [phone]           Note: this is not an onsite visit; there is no need to come to the facility.              Who to contact     If you have questions or need follow up information about today's clinic visit or your schedule please contact [facility] directly at [phone].  Normal or non-critical lab and imaging results will be communicated to you by MyChart, letter or phone within 4 business days after the clinic has received the results. If you do not  hear from us within 7 days, please contact the clinic through "[employer] or phone. If you have a critical or abnormal lab result, we will notify you by phone as soon as possible.  Submit refill requests through "[employer] or call your pharmacy and they will forward the refill request to us. Please allow 3 business days for your refill to be completed.          Additional Information About Your Visit        [employer] Information     "[employer] gives you secure access to your electronic health record. If you see a primary care provider, you can also send messages to your care team and make appointments. If you have questions, please call your primary care clinic.  If you do not have a primary care provider, please call [phone] and they will assist you.        Care EveryWhere ID     This is your Care EveryWhere ID. This could be used by other organizations to access your [place] medical records  [id number]         Blood Pressure from Last 3 Encounters:   01/25/18 137/63   01/15/18 156/70   12/25/17 134/51    Weight from Last 3 Encounters:   01/25/18 233 lb 7.5 oz (105.9 kg)   01/14/18 226 lb 14.4 oz (102.9 kg)   12/23/17 238 lb (108 kg)              Today, you had the following     No orders found for display         Today's Medication Changes          These changes are accurate as of 1/30/18 11:59 PM.  If you have any questions, ask your nurse or doctor.               These medicines have changed or have updated prescriptions.        Dose/Directions    LANTUS SOLOSTAR 100 UNIT/ML injection   This may have changed:    - how much to take  - how to take this  - when to take this  - additional instructions   Used for:  Type 2 diabetes mellitus with diabetic polyneuropathy, with long-term current use of insulin (H)   Generic drug:  insulin glargine   Changed by:  Conchita Toledo, [facility]        Dose:  5 Units   Inject 5 Units Subcutaneous daily   Quantity:  15 mL   Refills:  11       NovoLOG FLEXPEN 100 UNIT/ML injection    This may have changed:  additional instructions   Used for:  Type 2 diabetes mellitus with diabetic polyneuropathy, with long-term current use of insulin (H)   Generic drug:  insulin aspart   Changed by:  Conchita Toledo [facility]        Inject 2-8u sliding scale with meals and 1-4u sliding scale at bedtime   Quantity:  15 mL   Refills:  11                Primary Care Provider Office Phone # Fax #    Marivel Barcenas, APRN [facility] [phone]       [address] [facility] [address]  [facility] [address]        Equal Access to Services     [facility]: Hadii aad ku hadasho Soomaali, waaxda luqadaha, qaybta kaalmada adeegyada, waxay khalifin hayaan anni aguilar . So [facility] [phone].    ATENCIÓN: Si habla español, tiene a peguero disposición servicios gratuitos de asistencia lingüística. [facility] [place] [phone].    We comply with applicable federal civil rights laws and [place] laws. We do not discriminate on the basis of race, color, national origin, age, disability, sex, sexual orientation, or gender identity.            Thank you!     Thank you for choosing [facility]  for your care. Our goal is always to provide you with excellent care. Hearing back from our patients is one way we can continue to improve our services. Please take a few minutes to complete the written survey that you may receive in the mail after your visit with us. Thank you!             Your Updated Medication List - Protect others around you: Learn how to safely use, store and throw away your medicines at www.disposemymeds.org.          This list is accurate as of 1/30/18 11:59 PM.  Always use your most recent med list.                   Brand Name Dispense Instructions for use Diagnosis    ACCU-CHEK COMPACT PLUS test strip   Generic drug:  blood glucose monitoring     100 strip    Use to test blood sugar 3 times daily or as directed.  Ok to substitute alternative if insurance prefers.         ACETAMINOPHEN PO      Take 325-650 mg by mouth every 4 hours as needed.        aspirin 81 MG chewable tablet     90 tablet    Take 1 tablet (81 mg) by mouth daily Starting 1 week after your kidney biopsy    Pulmonary embolism and infarction (H), Diabetes mellitus with background retinopathy (H)       baclofen 10 MG tablet    LIORESAL    90 tablet    Take 1 tablet (10 mg) by mouth 3 times daily as needed for muscle spasms        cloNIDine 0.1 MG tablet    CATAPRES    180 tablet    TAKE ONE TABLET BY MOUTH TWICE A DAY    HTN, goal below 140/90       Cranberry 400 MG Tabs      Take 400 mg by mouth 2 times daily        cyanocobalamin 1000 MCG/ML injection    VITAMIN B12    3 mL    Inject 1 mL (1,000 mcg) into the muscle every 30 days    Iron deficiency anemia, unspecified iron deficiency anemia type       diphenoxylate-atropine 2.5-0.025 MG per tablet    LOMOTIL    30 tablet    Take 1 tablet by mouth 4 times daily as needed for diarrhea    Diarrhea of presumed infectious origin       famotidine 20 MG tablet    PEPCID    60 tablet    Take 1 tablet (20 mg) by mouth 2 times daily        gabapentin 300 MG capsule    NEURONTIN    360 capsule    Take 2 capsules (600 mg) by mouth 2 times daily For pain    Diabetic polyneuropathy associated with type 2 diabetes mellitus (H)       insulin pen needle 30G X 8 MM     100 each    Use as directed with Андрей    Type 2 diabetes mellitus with diabetic nephropathy (H)       labetalol 300 MG tablet    NORMODYNE    120 tablet    Take 150 mg by mouth 2 times daily Take 1/2 tablet (150mg) =300mg. Hold for systolic BP less than 120.    Benign essential hypertension, Kidney replaced by transplant       lactobacillus rhamnosus (GG) capsule     60 capsule    Take 1 capsule by mouth 2 times daily    Diarrhea       LANTUS SOLOSTAR 100 UNIT/ML injection   Generic drug:  insulin glargine     15 mL    Inject 5 Units Subcutaneous daily    Type 2 diabetes mellitus with diabetic polyneuropathy, with  long-term current use of insulin (H)       levETIRAcetam 1000 MG Tabs     60 tablet    Take 1,000 mg by mouth 2 times daily    Encephalopathy acute, Status epilepticus (H)       levothyroxine 50 MCG tablet    SYNTHROID/LEVOTHROID    90 tablet    TAKE ONE TABLET BY MOUTH EVERY DAY    Other specified hypothyroidism       lidocaine 2 % topical gel    XYLOCAINE    30 mL    Apply topically as needed for moderate pain 10 ML every 3 weeks    Johnson catheter in place       loratadine 10 MG tablet    CLARITIN    90 tablet    Take 1 tablet (10 mg) by mouth daily    Acute nasopharyngitis       NovoLOG FLEXPEN 100 UNIT/ML injection   Generic drug:  insulin aspart     15 mL    Inject 2-8u sliding scale with meals and 1-4u sliding scale at bedtime    Type 2 diabetes mellitus with diabetic polyneuropathy, with long-term current use of insulin (H)       order for DME     3 catheter    Equipment being ordered: Johnson Catheters - 18 french    Johnson catheter status       * order for DME     100 pad    Equipment being ordered: Blue Chux Please send to [facility]    Fecal incontinence       * order for DME     1 each    Equipment being ordered: Ahuja FX CPAP interface (nasal pillows), size XS.    Obstructive sleep apnea syndrome       * order for DME     12 each    Equipment being ordered: Silver Impregnated catheters HX of frequent UTI    Recurrent UTI       * order for DME     1 Device    High back guerrero wheel chair MICHELLE 99 DX: morbid obesity, generalized weakness, physical deconditioning, chronic vertigo    Morbid obesity, unspecified obesity type (H), Vertigo, Generalized muscle weakness, Physical deconditioning       pravastatin 10 MG tablet    PRAVACHOL    90 tablet    TAKE ONE TABLET BY MOUTH EVERY DAY    Hyperlipidemia LDL goal <100       predniSONE 5 MG tablet    DELTASONE    90 tablet    Take 1 tablet (5 mg) by mouth daily    Kidney replaced by transplant       simethicone 125 MG Chew chewable tablet    MYLICON    120 tablet     Take 1 tablet (125 mg) by mouth as needed for intestinal gas        TACROLIMUS PO      Take 2.5 mg by mouth 2 times daily        VITAMIN D (CHOLECALCIFEROL) PO      Take 4,000 Units by mouth 2 times daily        * Notice:  This list has 4 medication(s) that are the same as other medications prescribed for you. Read the directions carefully, and ask your doctor or other care provider to review them with you.

## 2018-02-05 ENCOUNTER — VIRTUAL VISIT (OUTPATIENT)
Dept: FAMILY MEDICINE | Facility: CLINIC | Age: 73
End: 2018-02-05
Payer: COMMERCIAL

## 2018-02-05 DIAGNOSIS — G40.901 STATUS EPILEPTICUS (H): ICD-10-CM

## 2018-02-05 DIAGNOSIS — N39.0 RECURRENT UTI: ICD-10-CM

## 2018-02-05 DIAGNOSIS — Z94.0 KIDNEY REPLACED BY TRANSPLANT: Primary | ICD-10-CM

## 2018-02-05 DIAGNOSIS — I10 BENIGN ESSENTIAL HYPERTENSION: ICD-10-CM

## 2018-02-05 DIAGNOSIS — E66.01 MORBID OBESITY, UNSPECIFIED OBESITY TYPE (H): ICD-10-CM

## 2018-02-05 DIAGNOSIS — E11.42 DIABETIC POLYNEUROPATHY ASSOCIATED WITH TYPE 2 DIABETES MELLITUS (H): ICD-10-CM

## 2018-02-05 PROCEDURE — 98968 PH1 ASSMT&MGMT NQHP 21-30: CPT | Performed by: NURSE PRACTITIONER

## 2018-02-05 NOTE — PATIENT INSTRUCTIONS
We will increase your Lantus to 8U today and make further adjustments at her visit with the PharmD tomorrow    We will call your [facility] nurse and see when we can get PT started again     Please call with any sx of UTI or confusion    I will call you again in one month

## 2018-02-05 NOTE — PROGRESS NOTES
"Gem Jade is a 72 year old female who is being evaluated via a billable telephone visit.     PLEASE ASK PHARMACY LOCATION :    The patient has been notified of following:     \"This telephone visit will be conducted via a call between you and your physician/provider. We have found that certain health care needs can be provided without the need for a physical exam.  This service lets us provide the care you need with a short phone conversation.  If a prescription is necessary we can send it directly to your pharmacy.  If lab work is needed we can place an order for that and you can then stop by our lab to have the test done at a later time.    We will bill your insurance company for this service.  Please check with your medical insurance if this type of visit is covered. You may be responsible for the cost of this type of visit if insurance coverage is denied.  The typical cost is $30 (10min), $59 (11-20min) and $85 (21-30min).  Most often these visits are shorter than 10 minutes.    If during the course of the call the physician/provider feels a telephone visit is not appropriate, you will not be charged for this service.\"       Consent has been obtained for this service by care team member: yes. See the scanned image in the medical record.  SUBJECTIVE:     Gem Jade complains of    Chief Complaint   Patient presents with     RECHECK       I have reviewed and updated the patient's Past Medical History, Social History, Family History and Medication List.    ALLERGIES  Bactrim ds [sulfamethoxazole w/trimethoprim]; Atorvastatin calcium; Codeine; Darvocet [propoxyphene n-apap]; Hydromorphone; Levofloxacin; Morphine; Niaspan [niacin]; Percocet [oxycodone-acetaminophen]; and Vicodin [hydrocodone-acetaminophen]    Cecilia Hernandez MA  (MA signature)    Additional provider notes:   Diabetes Follow-up    Patient is checking blood sugars: three times daily.   Blood sugar testing frequency justification: " Uncontrolled diabetes, using sliding scale x 1 a day only and lantus new scale, discussed increasing her lantus to 8u daily  Results are as follows: afraid to eat now, eating only 2x a day         lunchtime - fasting 300-500          suppertime - 350-500         bedtime - 300-500    Diabetic concerns: frequent infections and blood sugar frequently over 200, eating breakfast- oatmeal, omelet, lunch cottage cheese before dinner, then dinner and a snack before bed sugar free jocelyn,       Symptoms of hypoglycemia (low blood sugar): none     Paresthesias (numbness or burning in feet) or sores: Yes slight increase now, increase in her neuropathy when she wakes up 7am but pain does not wake her up, comes and goes, not every day, taking gabapentin 600mg 2x daily, 12pm and 12am and stopped her trileptal and tramadol, and baclofen only with cath changes,  not interested in increasing her gabapentin right now       Date of last diabetic eye exam: unsure    BP Readings from Last 2 Encounters:   01/25/18 137/63   01/15/18 156/70     Hemoglobin A1C (%)   Date Value   01/12/2018 6.3 (H)   12/19/2017 6.7 (H)     LDL Cholesterol Calculated (mg/dL)   Date Value   12/05/2016 64   02/05/2015 18     Hypertension Follow-up      Outpatient blood pressures are being checked at home.  Results are WNL    will look at changing antihypertensives at next visit with PharmD, to get off beta blocker      Low Salt Diet: no added salt    Depression and Anxiety Follow-Up    Status since last visit: No change, worried about her tests and her all her medications, slight confusion    Other associated symptoms: fatigue,high sugars    Complicating factors: generalized weakness    Significant life event: Yes-  Multiple UTI's      Current substance abuse: None    No flowsheet data found.  No flowsheet data found.    PHQ-9  English  PHQ-9   Any Language  LISSY-7  Suicide Assessment Five-step Evaluation and Treatment (SAFE-T)    KIDNEY  TRANSPLANT        Duration: right kidney replaced, left one non functional     Description (location/character/radiation): chronic managed by kidney transplant team,      Intensity:  moderate    Accompanying signs and symptoms: fatigue,   History (similar episodes/previous evaluation):  kidney transplant 10/29/1999       Precipitating or alleviating factors: DM, bed bound, rarely gets up in her WC     Therapies tried and outcome: see transplant team notes   Reviewed the following tests with the patient and her   MRI   Encephalopathy, immunosuppressed (kidney).   IMPRESSION:   1. Subtle abnormal T2 signal hyperintensity and abnormal contrast  enhancement in a few sulci at the posterior aspect of the left  parietal and occipital lobes. Lumbar puncture may be helpful to  evaluate for an infectious or inflammatory process.  2. Possible acute sinusitis again noted.  3. Diffuse cerebral volume loss and cerebral white matter changes  consistent with chronic small vessel ischemic disease.  Hospital recommendation:  you will also be getting MRI of the brain in few weeks and then will see neurologist for follow up and review the results of your MRI.    EEG RESULTS: 1/18/2018  IMPRESSION:  Abnormal.  There was evidence of a moderate to severe diffuse encephalopathy at the beginning of the study which improved dramatically to a mild encephalopathy by study's end.       Persistent generalized periodic sharp waves responded acutely to midazolam injection.  They responded transiently to levetiracetam injection.  Periodic sharp activity returned for about 8 hours, but then gradually dissipated and stopped completely for the last 8 hours of the study, following initiation of maintenance levetiracetam therapy.      Periodic sharp activity at the rate noted in the study is not a typical pattern for nonconvulsive status epilepticus.  However, administration of both acute maintenance anticonvulsant medication resulted in  improvement of this EEG pattern in tandem with clinical improvement. There remains a possibility that the patient's encephalopathy was resolving on its own and that the apparent response to anti-seizure medications is a coincidence.  Nonetheless, the majority of the circumstantial evidence favors the interpretation the the patient was in an unusual form of nonconvulsive status epilepticus that responded to anticonvulsant treatment.     Discussed not restarting Zofran for now and we will look for other alternatives for her dizziness and nausea and vomiting when she gets up in her chair, discussed waiting for lumbar puncture and MRI until sugars stable and cognition at baseline     OBJECTIVE:     (Z94.0) Kidney replaced by transplant  (primary encounter diagnosis)  Comment: stable, having labs drawn regularly , last draw was 2/6/17  Plan: continue to work with her new transplant team and Dr. Elizabeth    (I10) Benign essential hypertension  Comment: stable, occ hypotensive, discussed reducing her beta blockers  Plan: Will discuss with PharmD at phone visit     (N39.0) Recurrent UTI  Comment: chronic, monitoring closely, no sx today, cognition almost back to baseline  Plan: continue to monitor    (E66.01) Morbid obesity, unspecified obesity type (H)  Comment: slight improvement   Plan: discussed diet and increasing core strength, will reorder PT for strengthing     (E11.42) Diabetic polyneuropathy associated with type 2 diabetes mellitus (H)  Comment: worse, elevated sugars even with start up of lantus and increase in her sliding scale  Plan: Will increase her lantus to 8u daily and discuss further increases with Pharmd, maintain current sliding scale and discussed using it 2-3x daily    (G40.901) Status epilepticus (H)  Comment: stable, no incidents  Plan: Keppra (Levetiracetam) Level        Long discussion today about maintaining Keppra for now, reviewed usage      Patient Instructions   We will increase your Lantus to  8U today and make further adjustments at her visit with the PharmD tomorrow    We will call your HC nurse and see when we can get PT started again     Please call with any sx of UTI or confusion    I will call you again in one month        I have reviewed the note as documented above.  This accurately captures the substance of my conversation with the patient,  Klaudia MTZ      Total time of call between patient and provider was 30 minutes     Cecilia Jang, CMA

## 2018-02-05 NOTE — MR AVS SNAPSHOT
After Visit Summary   2/5/2018    Gem Jade    MRN: [id number]           Patient Information     Date Of Birth          1945        Visit Information        Provider Department      2/5/2018 3:30 PM Marivel Barcenas APRN CNP [facility]        Today's Diagnoses     Kidney replaced by transplant    -  1    Benign essential hypertension        Recurrent UTI        Morbid obesity, unspecified obesity type (H)        Diabetic polyneuropathy associated with type 2 diabetes mellitus (H)        Status epilepticus (H)          Care Instructions    We will increase your Lantus to 8U today and make further adjustments at her visit with the PharmD tomorrow    We will call your [facility] nurse and see when we can get PT started again     Please call with any sx of UTI or confusion    I will call you again in one month            Follow-ups after your visit        Follow-up notes from your care team     Return in about 4 weeks (around 3/5/2018).      Your next 10 appointments already scheduled     Feb 12, 2018  3:00 PM CST   TELEMEDICINE with Conchita Toledo [facility] ([facility])    [address] [place] [id number] [phone]           Note: this is not an onsite visit; there is no need to come to the facility.              Who to contact     If you have questions or need follow up information about today's clinic visit or your schedule please contact [facility] directly at [phone].  Normal or non-critical lab and imaging results will be communicated to you by MyChart, letter or phone within 4 business days after the clinic has received the results. If you do not hear from us within 7 days, please contact the clinic through MyChart or phone. If you have a critical or abnormal lab result, we will notify you by phone as soon as possible.  Submit refill  requests through [facility] or call your pharmacy and they will forward the refill request to us. Please allow 3 business days for your refill to be completed.          Additional Information About Your Visit        Follic[facility] Information     [facility] gives you secure access to your electronic health record. If you see a primary care provider, you can also send messages to your care team and make appointments. If you have questions, please call your primary care clinic.  If you do not have a primary care provider, please call [phone] and they will assist you.        Care EveryWhere ID     This is your Care EveryWhere ID. This could be used by other organizations to access your [facility] medical records  [id number]         Blood Pressure from Last 3 Encounters:   01/25/18 137/63   01/15/18 156/70   12/25/17 134/51    Weight from Last 3 Encounters:   01/25/18 233 lb 7.5 oz (105.9 kg)   01/14/18 226 lb 14.4 oz (102.9 kg)   12/23/17 238 lb (108 kg)               Primary Care Provider Office Phone # Fax #    Marivel Barcenas, APRN [facility] [phone]       [address] [facility] [address]  [facility] [address]        Equal Access to Services     CARLOS ENRIQUE GREEN : Hadii aad ku hadasho Soomaali, waaxda luqadaha, qaybta kaalmada adeegyada, jessica aguilar . So [facility] [phone].    ATENCIÓN: Si habla español, tiene a peguero disposición servicios gratuitos de asistencia lingüística. Llame al [phone].    We comply with applicable federal civil rights laws and [place] laws. We do not discriminate on the basis of race, color, national origin, age, disability, sex, sexual orientation, or gender identity.            Thank you!     Thank you for choosing [facility]  for your care. Our goal is always to provide you with excellent care. Hearing back from our patients is one way we can continue to improve our services. Please take a few minutes to complete the written  survey that you may receive in the mail after your visit with us. Thank you!             Your Updated Medication List - Protect others around you: Learn how to safely use, store and throw away your medicines at www.disposemymeds.org.          This list is accurate as of 2/5/18 11:59 PM.  Always use your most recent med list.                   Brand Name Dispense Instructions for use Diagnosis    ACCU-CHEK COMPACT PLUS test strip   Generic drug:  blood glucose monitoring     100 strip    Use to test blood sugar 3 times daily or as directed.  Ok to substitute alternative if insurance prefers.        ACETAMINOPHEN PO      Take 325-650 mg by mouth every 4 hours as needed.        aspirin 81 MG chewable tablet     90 tablet    Take 1 tablet (81 mg) by mouth daily Starting 1 week after your kidney biopsy    Pulmonary embolism and infarction (H), Diabetes mellitus with background retinopathy (H)       baclofen 10 MG tablet    LIORESAL    90 tablet    Take 1 tablet (10 mg) by mouth 3 times daily as needed for muscle spasms        cloNIDine 0.1 MG tablet    CATAPRES    180 tablet    TAKE ONE TABLET BY MOUTH TWICE A DAY    HTN, goal below 140/90       Cranberry 400 MG Tabs      Take 400 mg by mouth 2 times daily        cyanocobalamin 1000 MCG/ML injection    VITAMIN B12    3 mL    Inject 1 mL (1,000 mcg) into the muscle every 30 days    Iron deficiency anemia, unspecified iron deficiency anemia type       diphenoxylate-atropine 2.5-0.025 MG per tablet    LOMOTIL    30 tablet    Take 1 tablet by mouth 4 times daily as needed for diarrhea    Diarrhea of presumed infectious origin       famotidine 20 MG tablet    PEPCID    60 tablet    Take 1 tablet (20 mg) by mouth 2 times daily        gabapentin 300 MG capsule    NEURONTIN    360 capsule    Take 2 capsules (600 mg) by mouth 2 times daily For pain    Diabetic polyneuropathy associated with type 2 diabetes mellitus (H)       insulin pen needle 30G X 8 MM     100 each    Use as  directed with Lantus    Type 2 diabetes mellitus with diabetic nephropathy (H)       labetalol 300 MG tablet    NORMODYNE    120 tablet    Take 150 mg by mouth 2 times daily Take 1/2 tablet (150mg) =300mg. Hold for systolic BP less than 120.    Benign essential hypertension, Kidney replaced by transplant       lactobacillus rhamnosus (GG) capsule     60 capsule    Take 1 capsule by mouth 2 times daily    Diarrhea       LANTUS SOLOSTAR 100 UNIT/ML injection   Generic drug:  insulin glargine     15 mL    Inject 5 Units Subcutaneous daily    Type 2 diabetes mellitus with diabetic polyneuropathy, with long-term current use of insulin (H)       levETIRAcetam 1000 MG Tabs     60 tablet    Take 1,000 mg by mouth 2 times daily    Encephalopathy acute, Status epilepticus (H)       levothyroxine 50 MCG tablet    SYNTHROID/LEVOTHROID    90 tablet    TAKE ONE TABLET BY MOUTH EVERY DAY    Other specified hypothyroidism       lidocaine 2 % topical gel    XYLOCAINE    30 mL    Apply topically as needed for moderate pain 10 ML every 3 weeks    Johnson catheter in place       loratadine 10 MG tablet    CLARITIN    90 tablet    Take 1 tablet (10 mg) by mouth daily    Acute nasopharyngitis       NovoLOG FLEXPEN 100 UNIT/ML injection   Generic drug:  insulin aspart     15 mL    Inject 2-8u sliding scale with meals and 1-4u sliding scale at bedtime    Type 2 diabetes mellitus with diabetic polyneuropathy, with long-term current use of insulin (H)       order for DME     3 catheter    Equipment being ordered: Johnson Catheters - 18 french    Johnson catheter status       * order for DME     100 pad    Equipment being ordered: Blue Chux Please send to [facility]    Fecal incontinence       * order for DME     1 each    Equipment being ordered: Ahuja FX CPAP interface (nasal pillows), size XS.    Obstructive sleep apnea syndrome       * order for DME     12 each    Equipment being ordered: Silver Impregnated catheters HX of frequent UTI     Recurrent UTI       * order for DME     1 Device    High back guerrero wheel chair MICHELLE 99 DX: morbid obesity, generalized weakness, physical deconditioning, chronic vertigo    Morbid obesity, unspecified obesity type (H), Vertigo, Generalized muscle weakness, Physical deconditioning       pravastatin 10 MG tablet    PRAVACHOL    90 tablet    TAKE ONE TABLET BY MOUTH EVERY DAY    Hyperlipidemia LDL goal <100       predniSONE 5 MG tablet    DELTASONE    90 tablet    Take 1 tablet (5 mg) by mouth daily    Kidney replaced by transplant       simethicone 125 MG Chew chewable tablet    MYLICON    120 tablet    Take 1 tablet (125 mg) by mouth as needed for intestinal gas        TACROLIMUS PO      Take 2.5 mg by mouth 2 times daily        VITAMIN D (CHOLECALCIFEROL) PO      Take 4,000 Units by mouth 2 times daily        * Notice:  This list has 4 medication(s) that are the same as other medications prescribed for you. Read the directions carefully, and ask your doctor or other care provider to review them with you.

## 2018-02-06 ENCOUNTER — TELEPHONE (OUTPATIENT)
Dept: PHARMACY | Facility: CLINIC | Age: 73
End: 2018-02-06

## 2018-02-06 ENCOUNTER — APPOINTMENT (OUTPATIENT)
Dept: LAB | Facility: CLINIC | Age: 73
End: 2018-02-06
Payer: COMMERCIAL

## 2018-02-06 LAB
ANION GAP SERPL CALCULATED.3IONS-SCNC: 7 MMOL/L (ref 3–14)
BASOPHILS # BLD AUTO: 0 10E9/L (ref 0–0.2)
BASOPHILS NFR BLD AUTO: 0.6 %
BUN SERPL-MCNC: 42 MG/DL (ref 7–30)
CALCIUM SERPL-MCNC: 8.8 MG/DL (ref 8.5–10.1)
CHLORIDE SERPL-SCNC: 101 MMOL/L (ref 94–109)
CO2 SERPL-SCNC: 26 MMOL/L (ref 20–32)
CREAT SERPL-MCNC: 1.43 MG/DL (ref 0.52–1.04)
DIFFERENTIAL METHOD BLD: ABNORMAL
EOSINOPHIL # BLD AUTO: 0.2 10E9/L (ref 0–0.7)
EOSINOPHIL NFR BLD AUTO: 3.9 %
ERYTHROCYTE [DISTWIDTH] IN BLOOD BY AUTOMATED COUNT: 15.2 % (ref 10–15)
GFR SERPL CREATININE-BSD FRML MDRD: 36 ML/MIN/1.7M2
GLUCOSE SERPL-MCNC: 347 MG/DL (ref 70–99)
HCT VFR BLD AUTO: 30.8 % (ref 35–47)
HGB BLD-MCNC: 10.1 G/DL (ref 11.7–15.7)
IMM GRANULOCYTES # BLD: 0 10E9/L (ref 0–0.4)
IMM GRANULOCYTES NFR BLD: 0.4 %
LYMPHOCYTES # BLD AUTO: 1.5 10E9/L (ref 0.8–5.3)
LYMPHOCYTES NFR BLD AUTO: 31.3 %
MCH RBC QN AUTO: 31 PG (ref 26.5–33)
MCHC RBC AUTO-ENTMCNC: 32.8 G/DL (ref 31.5–36.5)
MCV RBC AUTO: 95 FL (ref 78–100)
MONOCYTES # BLD AUTO: 0.5 10E9/L (ref 0–1.3)
MONOCYTES NFR BLD AUTO: 9.6 %
NEUTROPHILS # BLD AUTO: 2.7 10E9/L (ref 1.6–8.3)
NEUTROPHILS NFR BLD AUTO: 54.2 %
NRBC # BLD AUTO: 0 10*3/UL
NRBC BLD AUTO-RTO: 0 /100
PLATELET # BLD AUTO: 140 10E9/L (ref 150–450)
POTASSIUM SERPL-SCNC: 4.9 MMOL/L (ref 3.4–5.3)
RBC # BLD AUTO: 3.26 10E12/L (ref 3.8–5.2)
SODIUM SERPL-SCNC: 134 MMOL/L (ref 133–144)
WBC # BLD AUTO: 4.9 10E9/L (ref 4–11)

## 2018-02-06 PROCEDURE — 80048 BASIC METABOLIC PNL TOTAL CA: CPT

## 2018-02-06 PROCEDURE — 80197 ASSAY OF TACROLIMUS: CPT

## 2018-02-06 PROCEDURE — 85025 COMPLETE CBC W/AUTO DIFF WBC: CPT

## 2018-02-06 NOTE — TELEPHONE ENCOUNTER
Called pt and  to discuss medications and blood sugars.    Had a phone visit with PCP yesterday and insulin adjusted.  Verified doses and pt now taking Novolog sliding scale with all meals and bedtime for elevated BS.      Pt states she had catheter change today, not by usual RN.  Pt did take baclofen but having increased pain.  Reviewed chart and after last hospitalization, recommended she not take baclofen any more often than once with catheter change.  Advised conservative management and call clinic with further concerns.     Rescheduled MTM phone visit for 2 days as will be able to adjust insulin doses at that time.    Conchita Toledo, PharmD, BCACP

## 2018-02-07 LAB
TACROLIMUS BLD-MCNC: 4.5 UG/L (ref 5–15)
TME LAST DOSE: ABNORMAL H

## 2018-02-08 ENCOUNTER — ALLIED HEALTH/NURSE VISIT (OUTPATIENT)
Dept: PHARMACY | Facility: CLINIC | Age: 73
End: 2018-02-08
Payer: COMMERCIAL

## 2018-02-08 DIAGNOSIS — E11.65 TYPE 2 DIABETES MELLITUS WITH HYPERGLYCEMIA, WITH LONG-TERM CURRENT USE OF INSULIN (H): Primary | ICD-10-CM

## 2018-02-08 DIAGNOSIS — Z79.4 TYPE 2 DIABETES MELLITUS WITH HYPERGLYCEMIA, WITH LONG-TERM CURRENT USE OF INSULIN (H): Primary | ICD-10-CM

## 2018-02-08 DIAGNOSIS — E11.42 DIABETIC POLYNEUROPATHY ASSOCIATED WITH TYPE 2 DIABETES MELLITUS (H): ICD-10-CM

## 2018-02-08 PROCEDURE — 99606 MTMS BY PHARM EST 15 MIN: CPT | Performed by: PHARMACIST

## 2018-02-08 PROCEDURE — 99607 MTMS BY PHARM ADDL 15 MIN: CPT | Performed by: PHARMACIST

## 2018-02-08 NOTE — PATIENT INSTRUCTIONS
Recommendations from today's MTM visit:                                                    MTM (medication therapy management) is a service provided by a clinical pharmacist designed to help you get the most of out of your medicines.   Today we reviewed what your medicines are for, how to know if they are working, that your medicines are safe and how to make your medicine regimen as easy as possible.     1. Increase Lantus to 14u daily    2. Increase use of Novolog to 3 times a day.  Test blood sugar before meals and give Novolog before meals as well.  Test blood sugar again at bedtime and use Novolog.     Next MTM visit: I will call again on Monday at 3pm    To schedule another MTM appointment, please call the clinic directly or you may call the MTM scheduling line at [phone] or toll-free at [phone].     My Clinical Pharmacist's contact information:                                                      It was a pleasure speaking with you today!  Please feel free to contact me with any questions or concerns you have.      Conchita Toledo, Fritz, [facility]   [phone]    You may receive a survey about the MTM services you received.  I would appreciate your feedback to help me serve you better in the future. Please fill it out and return it when you can. Your comments will be anonymous.

## 2018-02-08 NOTE — PROGRESS NOTES
SUBJECTIVE/OBJECTIVE:                Gem Jade is a 72 year old female called for a follow-up visit for Medication Therapy Management.  She was referred to me from Marivel Barcenas.     Chief Complaint: Follow up from our visit on 1/30/18.  hyperglycemia  Tobacco: History of tobacco dependence - quit   Alcohol: not currently using    Medication Adherence: no issues found and has assistance setting up med boxes (HCRN)    neuropathy: currently taking . Has been having more pain and feet mostly bothersome.  Keeping her up at night, very uncomfortable.    Diabetes:  Pt currently taking Lantus 8u daily. Novolog 4-8u BID sliding scale. Lantus was held during last hospitalization due to concern of hypoglycemia contributing to encephalopathy. Pt is not experiencing side effects.  SMBG: two times daily.   Ranges (patient reported):   Date FBG/ 2hours post Lunch/2hours post Dinner /2hours post bedtime   2/7    490   2/6 409  /376     523  /270     431   517    409  460                     Patient is not experiencing hypoglycemia  Recent symptoms of high blood sugar? fatigue  Eye exam: due  Foot exam: due  Microalbumin is not < 30 mg/g. Pt is not taking an ACEi/ARB.  Aspirin: Taking 81mg daily and denies side effects  Diet/Exercise: increased appetite but not quite normal yet, eating twice a day.     Current labs include:  BP Readings from Last 3 Encounters:   01/25/18 137/63   01/15/18 156/70   12/25/17 134/51     Today's Vitals: There were no vitals taken for this visit. - phone visit  Lab Results   Component Value Date    A1C 6.3 01/12/2018   .  Lab Results   Component Value Date    CHOL 164 12/05/2016     Lab Results   Component Value Date    TRIG 272 12/05/2016     Lab Results   Component Value Date    HDL 46 12/05/2016     Lab Results   Component Value Date    LDL 64 12/05/2016       Liver Function Studies -   Recent Labs   Lab Test  01/20/18   0720   PROTTOTAL  7.1   ALBUMIN  2.6*   BILITOTAL  0.4    ALKPHOS  106   AST  22   ALT  20       Lab Results   Component Value Date    UCRR 43 11/16/2017    MICROL 1850 04/26/2017    UMALCR 6776.56 (H) 04/26/2017       Last Basic Metabolic Panel:  Lab Results   Component Value Date     02/06/2018      Lab Results   Component Value Date    POTASSIUM 4.9 02/06/2018     Lab Results   Component Value Date    CHLORIDE 101 02/06/2018     Lab Results   Component Value Date    BUN 42 02/06/2018     Lab Results   Component Value Date    CR 1.43 02/06/2018     GFR Estimate   Date Value Ref Range Status   02/06/2018 36 (L) >60 mL/min/1.7m2 Final     Comment:     Non  GFR Calc   01/24/2018 48 (L) >60 mL/min/1.7m2 Final     Comment:     Non  GFR Calc   01/22/2018 64 >60 mL/min/1.7m2 Final     Comment:     Non  GFR Calc     GFR Estimate If Black   Date Value Ref Range Status   02/06/2018 44 (L) >60 mL/min/1.7m2 Final     Comment:      GFR Calc   01/24/2018 59 (L) >60 mL/min/1.7m2 Final     Comment:      GFR Calc   01/22/2018 78 >60 mL/min/1.7m2 Final     Comment:      GFR Calc     TSH   Date Value Ref Range Status   01/19/2018 1.31 0.40 - 4.00 mU/L Final   ]    Most Recent Immunizations   Administered Date(s) Administered     HepB 01/13/2003     Influenza (H1N1) 11/25/2009     Influenza (High Dose) 3 valent vaccine 10/07/2016     Influenza (IIV3) PF 11/05/2011     Influenza Vaccine IM 3yrs+ 4 Valent IIV4 10/16/2013     Pneumo Conj 13-V (2010&after) 12/01/2015     Pneumococcal 23 valent 06/17/2013     TDAP Vaccine (Adacel) 06/17/2013     Zoster vaccine, live 06/17/2013       ASSESSMENT:              Current medications were reviewed today as discussed above.      Medication Adherence: no issues identified    neuropathy: needs improvement. Expect continued improvement in pain as blood sugars reduce. Offered to increase gabapentin to 900mg at bedtime but pt declined at this time due to  fear of SE/rehospitalization.  Prefer pt to remain off oxcarbazepine and tramadol.     Diabetes: needs improvement. A1c at goal <8% but BS remain elevated from discontinuation of insulin during last hospital stay.  Pt would benefit from increasing Lantus to 14u daily and increasing use of Novolog to TID (both meals and bedtime)       PLAN:                  Increase Lantus to 14u daily  Increase use of Novolog to TID    I spent 30 minutes with this patient today. All changes were made via collaborative practice agreement with Marivel Barcenas. A copy of the visit note was provided to the patient's primary care provider.     Will follow up in 4 days.    The patient sent via [employer] a summary of these recommendations as an after visit summary.    Conchita Toledo, PharmD, BCACP

## 2018-02-08 NOTE — MR AVS SNAPSHOT
After Visit Summary   2/8/2018    Gem Jade    MRN: [id number]           Patient Information     Date Of Birth          1945        Visit Information        Provider Department      2/8/2018 3:30 PM Conchita Toledo, [facility] Primary [facility] MTM        Today's Diagnoses     Type 2 diabetes mellitus with hyperglycemia, with long-term current use of insulin (H)    -  1    Diabetic polyneuropathy associated with type 2 diabetes mellitus (H)          Care Instructions    Recommendations from today's MTM visit:                                                    MTM (medication therapy management) is a service provided by a clinical pharmacist designed to help you get the most of out of your medicines.   Today we reviewed what your medicines are for, how to know if they are working, that your medicines are safe and how to make your medicine regimen as easy as possible.     1. Increase Lantus to 14u daily    2. Increase use of Novolog to 3 times a day.  Test blood sugar before meals and give Novolog before meals as well.  Test blood sugar again at bedtime and use Novolog.     Next MTM visit: I will call again on Monday at 3pm    To schedule another MTM appointment, please call the clinic directly or you may call the MTM scheduling line at [phone] or toll-free at [phone].     My Clinical Pharmacist's contact information:                                                      It was a pleasure speaking with you today!  Please feel free to contact me with any questions or concerns you have.      Conchita Toledo, PharmD, [facility]   [phone]    You may receive a survey about the MTM services you received.  I would appreciate your feedback to help me serve you better in the future. Please fill it out and return it when you can. Your comments will be anonymous.                  Follow-ups after your visit        Your next 10 appointments already scheduled     Feb 12,  2018  3:00 PM CST   TELEMEDICINE with Conchita Toledo RPH   [facility] ([facility])    [address] [facility] [address] [phone]           Note: this is not an onsite visit; there is no need to come to the facility.              Who to contact     If you have questions or need follow up information about today's clinic visit or your schedule please contact [facility] directly at [phone].  Normal or non-critical lab and imaging results will be communicated to you by [employer], letter or phone within 4 business days after the clinic has received the results. If you do not hear from us within 7 days, please contact the clinic through [employer] or phone. If you have a critical or abnormal lab result, we will notify you by phone as soon as possible.  Submit refill requests through [employer] or call your pharmacy and they will forward the refill request to us. Please allow 3 business days for your refill to be completed.          Additional Information About Your Visit        MyChart Information     [employer] gives you secure access to your electronic health record. If you see a primary care provider, you can also send messages to your care team and make appointments. If you have questions, please call your primary care clinic.  If you do not have a primary care provider, please call [phone] and they will assist you.        Care EveryWhere ID     This is your Care EveryWhere ID. This could be used by other organizations to access your [place] medical records  [id number]         Blood Pressure from Last 3 Encounters:   01/25/18 137/63   01/15/18 156/70   12/25/17 134/51    Weight from Last 3 Encounters:   01/25/18 233 lb 7.5 oz (105.9 kg)   01/14/18 226 lb 14.4 oz (102.9 kg)   12/23/17 238 lb (108 kg)              Today, you had the following     No orders found for display       Primary Care Provider Office  Phone # Fax #    Marivel Barcenas, APRN [phone]       [address] [facility] [address]  [facility] [address]        Equal Access to Services     CARLOS ENRIQUE GREEN : Hadii aad ku hadchristinao Soomaali, waaxda luqadaha, qaybta kaalmada adeegyada, jessica hopsonn anni harrell laBradjessie roman. So [facility] [phone].    ATENCIÓN: Si habla español, tiene a peguero disposición servicios gratuitos de asistencia lingüística. Llame al [phone].    We comply with applicable federal civil rights laws and [place] laws. We do not discriminate on the basis of race, color, national origin, age, disability, sex, sexual orientation, or gender identity.            Thank you!     Thank you for choosing [facility]  for your care. Our goal is always to provide you with excellent care. Hearing back from our patients is one way we can continue to improve our services. Please take a few minutes to complete the written survey that you may receive in the mail after your visit with us. Thank you!             Your Updated Medication List - Protect others around you: Learn how to safely use, store and throw away your medicines at www.disposemymeds.org.          This list is accurate as of 2/8/18  4:22 PM.  Always use your most recent med list.                   Brand Name Dispense Instructions for use Diagnosis    ACCU-CHEK COMPACT PLUS test strip   Generic drug:  blood glucose monitoring     100 strip    Use to test blood sugar 3 times daily or as directed.  Ok to substitute alternative if insurance prefers.        ACETAMINOPHEN PO      Take 325-650 mg by mouth every 4 hours as needed.        aspirin 81 MG chewable tablet     90 tablet    Take 1 tablet (81 mg) by mouth daily Starting 1 week after your kidney biopsy    Pulmonary embolism and infarction (H), Diabetes mellitus with background retinopathy (H)       baclofen 10 MG tablet    LIORESAL    90 tablet    Take 1 tablet (10 mg) by mouth 3 times  daily as needed for muscle spasms        cloNIDine 0.1 MG tablet    CATAPRES    180 tablet    TAKE ONE TABLET BY MOUTH TWICE A DAY    HTN, goal below 140/90       Cranberry 400 MG Tabs      Take 400 mg by mouth 2 times daily        cyanocobalamin 1000 MCG/ML injection    VITAMIN B12    3 mL    Inject 1 mL (1,000 mcg) into the muscle every 30 days    Iron deficiency anemia, unspecified iron deficiency anemia type       diphenoxylate-atropine 2.5-0.025 MG per tablet    LOMOTIL    30 tablet    Take 1 tablet by mouth 4 times daily as needed for diarrhea    Diarrhea of presumed infectious origin       famotidine 20 MG tablet    PEPCID    60 tablet    Take 1 tablet (20 mg) by mouth 2 times daily        gabapentin 300 MG capsule    NEURONTIN    360 capsule    Take 2 capsules (600 mg) by mouth 2 times daily For pain    Diabetic polyneuropathy associated with type 2 diabetes mellitus (H)       insulin pen needle 30G X 8 MM     100 each    Use as directed with Lantus    Type 2 diabetes mellitus with diabetic nephropathy (H)       labetalol 300 MG tablet    NORMODYNE    120 tablet    Take 150 mg by mouth 2 times daily Take 1/2 tablet (150mg) =300mg. Hold for systolic BP less than 120.    Benign essential hypertension, Kidney replaced by transplant       lactobacillus rhamnosus (GG) capsule     60 capsule    Take 1 capsule by mouth 2 times daily    Diarrhea       LANTUS SOLOSTAR 100 UNIT/ML injection   Generic drug:  insulin glargine     15 mL    Inject 5 Units Subcutaneous daily    Type 2 diabetes mellitus with diabetic polyneuropathy, with long-term current use of insulin (H)       levETIRAcetam 1000 MG Tabs     60 tablet    Take 1,000 mg by mouth 2 times daily    Encephalopathy acute, Status epilepticus (H)       levothyroxine 50 MCG tablet    SYNTHROID/LEVOTHROID    90 tablet    TAKE ONE TABLET BY MOUTH EVERY DAY    Other specified hypothyroidism       lidocaine 2 % topical gel    XYLOCAINE    30 mL    Apply topically as  needed for moderate pain 10 ML every 3 weeks    Johnson catheter in place       loratadine 10 MG tablet    CLARITIN    90 tablet    Take 1 tablet (10 mg) by mouth daily    Acute nasopharyngitis       NovoLOG FLEXPEN 100 UNIT/ML injection   Generic drug:  insulin aspart     15 mL    Inject 2-8u sliding scale with meals and 1-4u sliding scale at bedtime    Type 2 diabetes mellitus with diabetic polyneuropathy, with long-term current use of insulin (H)       order for DME     3 catheter    Equipment being ordered: Johnson Catheters - 18 french    Johnson catheter status       * order for DME     100 pad    Equipment being ordered: Blue Chux Please send to [facility]    Fecal incontinence       * order for DME     1 each    Equipment being ordered: Cube [employer] FX CPAP interface (nasal pillows), size XS.    Obstructive sleep apnea syndrome       * order for DME     12 each    Equipment being ordered: Silver Impregnated catheters HX of frequent UTI    Recurrent UTI       * order for DME     1 Device    High back guerrero wheel chair MICHELLE 99 DX: morbid obesity, generalized weakness, physical deconditioning, chronic vertigo    Morbid obesity, unspecified obesity type (H), Vertigo, Generalized muscle weakness, Physical deconditioning       pravastatin 10 MG tablet    PRAVACHOL    90 tablet    TAKE ONE TABLET BY MOUTH EVERY DAY    Hyperlipidemia LDL goal <100       predniSONE 5 MG tablet    DELTASONE    90 tablet    Take 1 tablet (5 mg) by mouth daily    Kidney replaced by transplant       simethicone 125 MG Chew chewable tablet    MYLICON    120 tablet    Take 1 tablet (125 mg) by mouth as needed for intestinal gas        TACROLIMUS PO      Take 2.5 mg by mouth 2 times daily        VITAMIN D (CHOLECALCIFEROL) PO      Take 4,000 Units by mouth 2 times daily        * Notice:  This list has 4 medication(s) that are the same as other medications prescribed for you. Read the directions carefully, and ask your doctor or other care provider to  review them with you.

## 2018-02-09 ENCOUNTER — TELEPHONE (OUTPATIENT)
Dept: FAMILY MEDICINE | Facility: CLINIC | Age: 73
End: 2018-02-09

## 2018-02-09 ENCOUNTER — NURSE TRIAGE (OUTPATIENT)
Dept: NURSING | Facility: CLINIC | Age: 73
End: 2018-02-09

## 2018-02-09 DIAGNOSIS — R30.0 DYSURIA: Primary | ICD-10-CM

## 2018-02-09 LAB
ALBUMIN UR-MCNC: 100 MG/DL
APPEARANCE UR: ABNORMAL
BACTERIA #/AREA URNS HPF: ABNORMAL /HPF
BASOPHILS # BLD AUTO: 0 10E9/L (ref 0–0.2)
BASOPHILS NFR BLD AUTO: 0.3 %
BILIRUB UR QL STRIP: NEGATIVE
COLOR UR AUTO: YELLOW
DIFFERENTIAL METHOD BLD: ABNORMAL
EOSINOPHIL # BLD AUTO: 0.2 10E9/L (ref 0–0.7)
EOSINOPHIL NFR BLD AUTO: 2.4 %
ERYTHROCYTE [DISTWIDTH] IN BLOOD BY AUTOMATED COUNT: 15.1 % (ref 10–15)
GLUCOSE UR STRIP-MCNC: 70 MG/DL
HCT VFR BLD AUTO: 33.2 % (ref 35–47)
HGB BLD-MCNC: 10.7 G/DL (ref 11.7–15.7)
HGB UR QL STRIP: ABNORMAL
IMM GRANULOCYTES # BLD: 0 10E9/L (ref 0–0.4)
IMM GRANULOCYTES NFR BLD: 0.3 %
KETONES UR STRIP-MCNC: NEGATIVE MG/DL
LEUKOCYTE ESTERASE UR QL STRIP: ABNORMAL
LYMPHOCYTES # BLD AUTO: 1.7 10E9/L (ref 0.8–5.3)
LYMPHOCYTES NFR BLD AUTO: 21.2 %
MCH RBC QN AUTO: 30.9 PG (ref 26.5–33)
MCHC RBC AUTO-ENTMCNC: 32.2 G/DL (ref 31.5–36.5)
MCV RBC AUTO: 96 FL (ref 78–100)
MONOCYTES # BLD AUTO: 0.6 10E9/L (ref 0–1.3)
MONOCYTES NFR BLD AUTO: 7.6 %
NEUTROPHILS # BLD AUTO: 5.4 10E9/L (ref 1.6–8.3)
NEUTROPHILS NFR BLD AUTO: 68.2 %
NITRATE UR QL: NEGATIVE
NRBC # BLD AUTO: 0 10*3/UL
NRBC BLD AUTO-RTO: 0 /100
PH UR STRIP: 6 PH (ref 5–7)
PLATELET # BLD AUTO: 119 10E9/L (ref 150–450)
PROCALCITONIN SERPL-MCNC: 0.07 NG/ML
RBC # BLD AUTO: 3.46 10E12/L (ref 3.8–5.2)
RBC #/AREA URNS AUTO: 48 /HPF (ref 0–2)
SOURCE: ABNORMAL
SP GR UR STRIP: 1.02 (ref 1–1.03)
UROBILINOGEN UR STRIP-MCNC: NORMAL MG/DL (ref 0–2)
WBC # BLD AUTO: 7.9 10E9/L (ref 4–11)
WBC #/AREA URNS AUTO: >182 /HPF (ref 0–2)
WBC CLUMPS #/AREA URNS HPF: PRESENT /HPF

## 2018-02-09 PROCEDURE — 85025 COMPLETE CBC W/AUTO DIFF WBC: CPT

## 2018-02-09 PROCEDURE — 87088 URINE BACTERIA CULTURE: CPT

## 2018-02-09 PROCEDURE — 84145 PROCALCITONIN (PCT): CPT

## 2018-02-09 PROCEDURE — 87186 SC STD MICRODIL/AGAR DIL: CPT

## 2018-02-09 PROCEDURE — 87086 URINE CULTURE/COLONY COUNT: CPT

## 2018-02-09 PROCEDURE — 81001 URINALYSIS AUTO W/SCOPE: CPT

## 2018-02-09 NOTE — TELEPHONE ENCOUNTER
Huddle with Klaudia, who placed orders for CBC with diff, UA/UC, and procalcitonin. Orders read to home care nurse. Klaudia to watch for results this weekend.    BALDEV WorkmanN, RN  [facility]

## 2018-02-09 NOTE — TELEPHONE ENCOUNTER
Reason for Call:  call back    Detailed comments: Yashira with [facility] home [facility] called pt's urine has been cloudy today and she's been having hallucinations.     Phone Number Patient can be reached at: Other phone number:  Yashira  [phone]    Best Time: anytmie    Can we leave a detailed message on this number? YES    Call taken on 2/9/2018 at 3:15 PM by Rich Henderson

## 2018-02-10 ENCOUNTER — DOCUMENTATION ONLY (OUTPATIENT)
Dept: CARE COORDINATION | Facility: CLINIC | Age: 73
End: 2018-02-10

## 2018-02-10 NOTE — PROGRESS NOTES
[facility] now requests orders and shares plan of care/discharge summaries for some patients through [employer].  Please REPLY TO THIS MESSAGE in order to give authorization for orders when needed.  This is considered a verbal order, you will still receive a faxed copy of orders for signature.  Thank you for your assistance in improving collaboration for our patients.    ORDER  HA 2 times a week for 8 weeks, SN twice weekly for four weeks, once weekly for 3 weeks and 2 times a month for one month. 7 PRN visits for change of condition, catheter management, labs.     Yashira Chiu RN  [phone]

## 2018-02-11 ENCOUNTER — TELEPHONE (OUTPATIENT)
Dept: FAMILY MEDICINE | Facility: CLINIC | Age: 73
End: 2018-02-11

## 2018-02-11 DIAGNOSIS — N39.0 URINARY TRACT INFECTION ASSOCIATED WITH INDWELLING URETHRAL CATHETER, SUBSEQUENT ENCOUNTER: Primary | ICD-10-CM

## 2018-02-11 DIAGNOSIS — T83.511D URINARY TRACT INFECTION ASSOCIATED WITH INDWELLING URETHRAL CATHETER, SUBSEQUENT ENCOUNTER: Primary | ICD-10-CM

## 2018-02-12 ENCOUNTER — ALLIED HEALTH/NURSE VISIT (OUTPATIENT)
Dept: PHARMACY | Facility: CLINIC | Age: 73
End: 2018-02-12
Payer: COMMERCIAL

## 2018-02-12 DIAGNOSIS — R19.7 DIARRHEA, UNSPECIFIED TYPE: ICD-10-CM

## 2018-02-12 DIAGNOSIS — E11.42 TYPE 2 DIABETES MELLITUS WITH DIABETIC POLYNEUROPATHY, WITH LONG-TERM CURRENT USE OF INSULIN (H): Primary | ICD-10-CM

## 2018-02-12 DIAGNOSIS — Z79.4 TYPE 2 DIABETES MELLITUS WITH DIABETIC POLYNEUROPATHY, WITH LONG-TERM CURRENT USE OF INSULIN (H): Primary | ICD-10-CM

## 2018-02-12 LAB
BACTERIA SPEC CULT: ABNORMAL
Lab: ABNORMAL
SPECIMEN SOURCE: ABNORMAL

## 2018-02-12 PROCEDURE — 99607 MTMS BY PHARM ADDL 15 MIN: CPT | Performed by: PHARMACIST

## 2018-02-12 PROCEDURE — 99606 MTMS BY PHARM EST 15 MIN: CPT | Performed by: PHARMACIST

## 2018-02-12 NOTE — TELEPHONE ENCOUNTER
Pt's  stated that Pt has had a little bit of diarrhea but otherwise she is ok    Urine still a little cloudy.  Not getting worsening symptoms.    No night sweats and no other symptoms of infection.      Writer informed Pt's  that we would call him back with any updates.    Taqueria Araujo RN

## 2018-02-12 NOTE — PROGRESS NOTES
[facility] order request reviewed and OK'd.      Please provide services requested.    Taqueria Araujo RN

## 2018-02-12 NOTE — TELEPHONE ENCOUNTER
Labs reviewed, no indication of new infection, no final UC results, will consult with ID before treating.  Please call patient and see how she is doing    Klaudia MTZ

## 2018-02-12 NOTE — PROGRESS NOTES
SUBJECTIVE/OBJECTIVE:                Gem aJde is a 72 year old female called for a follow-up visit for Medication Therapy Management.  She was referred to me from Marivel Barcenas. Also spoke with  Keshawn.    Chief Complaint: Follow up from our visit on 2/8/18.  Unsure how to take Lomotil.  Tobacco: History of tobacco dependence - quit   Alcohol: not currently using    Medication Adherence: no issues found and has assistance setting up med boxes (HCRN)    Diarrhea/AMS: reports fatigue, confusion/AMS on evening of 2/8 and all day 2/9.  Hallucination on Friday, realized it was a hallucination and reported to HCRN.  Also had diarrhea on 2/10 and 2/11 but has now resolved. Took lomotil 1 tab x3 doses in a 12-hour period over the weekend.  When asking pt and  about symptoms and dosing of lomotil,  became frustrated and angry with disagreement on exact symptoms and hung up the phone.     Diabetes:  Pt currently taking Lantus 14u daily. Novolog 4-8u BID sliding scale. Lantus was held during last hospitalization due to concern of hypoglycemia contributing to encephalopathy. Pt is not experiencing side effects.  SMBG: two times daily.   Ranges (patient reported):   Date FBG/ 2hours post Lunch/2hours post Dinner /2hours post bedtime   2/12 212      2/11   254,262 420      322    2/9 No tests, pt confused/slept all day         443                     Patient is not experiencing hypoglycemia  Recent symptoms of high blood sugar? fatigue  Eye exam: due  Foot exam: due  Microalbumin is not < 30 mg/g. Pt is not taking an ACEi/ARB.  Aspirin: Taking 81mg daily and denies side effects  Diet/Exercise: did not report today    Current labs include:  BP Readings from Last 3 Encounters:   01/25/18 137/63   01/15/18 156/70   12/25/17 134/51     Today's Vitals: There were no vitals taken for this visit. - phone visit  Lab Results   Component Value Date    A1C 6.3 01/12/2018   .  Lab Results   Component  Value Date    CHOL 164 12/05/2016     Lab Results   Component Value Date    TRIG 272 12/05/2016     Lab Results   Component Value Date    HDL 46 12/05/2016     Lab Results   Component Value Date    LDL 64 12/05/2016       Liver Function Studies -   Recent Labs   Lab Test  01/20/18   0720   PROTTOTAL  7.1   ALBUMIN  2.6*   BILITOTAL  0.4   ALKPHOS  106   AST  22   ALT  20       Lab Results   Component Value Date    UCRR 43 11/16/2017    MICROL 1850 04/26/2017    UMALCR 6776.56 (H) 04/26/2017       Last Basic Metabolic Panel:  Lab Results   Component Value Date     02/06/2018      Lab Results   Component Value Date    POTASSIUM 4.9 02/06/2018     Lab Results   Component Value Date    CHLORIDE 101 02/06/2018     Lab Results   Component Value Date    BUN 42 02/06/2018     Lab Results   Component Value Date    CR 1.43 02/06/2018     GFR Estimate   Date Value Ref Range Status   02/06/2018 36 (L) >60 mL/min/1.7m2 Final     Comment:     Non  GFR Calc   01/24/2018 48 (L) >60 mL/min/1.7m2 Final     Comment:     Non  GFR Calc   01/22/2018 64 >60 mL/min/1.7m2 Final     Comment:     Non  GFR Calc     GFR Estimate If Black   Date Value Ref Range Status   02/06/2018 44 (L) >60 mL/min/1.7m2 Final     Comment:      GFR Calc   01/24/2018 59 (L) >60 mL/min/1.7m2 Final     Comment:      GFR Calc   01/22/2018 78 >60 mL/min/1.7m2 Final     Comment:      GFR Calc     TSH   Date Value Ref Range Status   01/19/2018 1.31 0.40 - 4.00 mU/L Final   ]    Most Recent Immunizations   Administered Date(s) Administered     HepB 01/13/2003     Influenza (H1N1) 11/25/2009     Influenza (High Dose) 3 valent vaccine 10/07/2016     Influenza (IIV3) PF 11/05/2011     Influenza Vaccine IM 3yrs+ 4 Valent IIV4 10/16/2013     Pneumo Conj 13-V (2010&after) 12/01/2015     Pneumococcal 23 valent 06/17/2013     TDAP Vaccine (Adacel) 06/17/2013     Zoster vaccine,  live 2013       ASSESSMENT:              Current medications were reviewed today as discussed above.      Medication Adherence: no issues identified    Diarrhea/AMS: improved. Reviewed potential SE of Lomotil with pt and , including CNS depression and CNS SE. Encouraged limiting use but will discuss with PCP more concrete parameters on when to use Lomotil.   Did not discuss transplant medication today, but wonder if any of the diarrhea and CNS effects could be explained by variations in levels of tacrolimus due to pt variability with diet.  Tacrolimus absorption can be affected by food. Will discuss with pt/ at follow-up and attempt to attain more consistency to rule this out as a potential cause.  Per [employer]:  Food: Food decreases rate and extent of absorption. High-fat meals have most pronounced effect (37% and 25% decrease in AUC, respectively, and 77% and 25% decrease in Cmax, respectively, for immediately release and extended release formulations). Grapefruit juice, a CY inhibitor, may increase serum level and/or toxicity of tacrolimus. Management: Administer with or without food (immediate release), but be consistent.  Avoid concurrent use of grapefruit juice.  Per [employer]:  c) Immediate-release Capsules  1) Administration of tacrolimus immediate-release capsules with food decreased the rate and extent of absorption. In a study of healthy volunteers (n=15), a high-fat meal (848 kcal, 46% fat) affected absorption to the greatest extent: the Cmax decreased by 77%, AUC decreased by 37%, and the Tmax increased 5-fold. A high-carbohydrate meal (668 kcal, 85% carbohydrates) decreased Cmax by 65% and decreased AUC by 28% [81].  2) In a study of healthy volunteers (n=16), administration of immediate-release tacrolimus immediately after a meal or 1.5 hours after a meal resulted in a 63% to 71% reduction of Cmax and a 39% reduction of AUC relative to fasted state [81].  3) In a study of  liver transplant recipients (n=11), administration of a tacrolimus immediate-release capsule 15 minutes after consumption of a high-fat breakfast (400 kcal, 34% fat) resulted in an AUC reduction of 27% and Cmax reduction of 50% compared with fasted state [81].    Diabetes: needs improvement. A1c at goal <8% but BS remain elevated since hospitalization, though improved.  PT would benefit from continued titration of Lantus. Continued to encourage increased BS testing.       PLAN:                  Increase Lantus to 16u daily    At follow-up: discuss tacrolimus/food interaction. Give pt/caretaker more concrete instructions on when to use Lomotil.     I spent 30 minutes with this patient today. All changes were made via collaborative practice agreement with Marivel Barcenas. A copy of the visit note was provided to the patient's primary care provider.     Will follow up in 4 days.    The patient was sent via [employer] a summary of these recommendations as an after visit summary.    Conchita Toledo, PharmD, BCACP

## 2018-02-12 NOTE — PATIENT INSTRUCTIONS
Recommendations from today's MTM visit:                                                        1. Increase Lantus to 16 units daily    2. Keep working on blood sugar testing, recommend 2-3 times a day before meals and at bedtime    3. Will talk to Klaudia and agree on a plan for when to use Lomotil    Next MTM visit: I will call again on Thursday at 3:30pm    To schedule another MTM appointment, please call the clinic directly or you may call the MTM scheduling line at [phone] or toll-free at [phone].     My Clinical Pharmacist's contact information:                                                      It was a pleasure speaking with you today!  Please feel free to contact me with any questions or concerns you have.      Conchita Toledo, PharmD, BCACP     You may receive a survey about the MTM services you received.  I would appreciate your feedback to help me serve you better in the future. Please fill it out and return it when you can. Your comments will be anonymous.

## 2018-02-12 NOTE — MR AVS SNAPSHOT
After Visit Summary   2/12/2018    Gem Jade    MRN: [id number]           Patient Information     Date Of Birth          1945        Visit Information        Provider Department      2/12/2018 3:00 PM Conchita Toledo, [facility]        Today's Diagnoses     Type 2 diabetes mellitus with diabetic polyneuropathy, with long-term current use of insulin (H)    -  1    Diarrhea, unspecified type          Care Instructions    Recommendations from today's MT visit:                                                        1. Increase Lantus to 16 units daily    2. Keep working on blood sugar testing, recommend 2-3 times a day before meals and at bedtime    3. Will talk to Klaudia and agree on a plan for when to use Lomotil    Next MTM visit: I will call again on Thursday at 3:30pm    To schedule another MTM appointment, please call the clinic directly or you may call the MTM scheduling line at [phone] or toll-free at [phone].     My Clinical Pharmacist's contact information:                                                      It was a pleasure speaking with you today!  Please feel free to contact me with any questions or concerns you have.      Conchita Toledo, PharmD, BCACP     You may receive a survey about the MTM services you received.  I would appreciate your feedback to help me serve you better in the future. Please fill it out and return it when you can. Your comments will be anonymous.                  Follow-ups after your visit        Your next 10 appointments already scheduled     Feb 15, 2018  3:30 PM CST   TELEMEDICINE with Conchita Toledo [facility] ([facility])    [address] [place] [id number] [phone]           Note: this is not an onsite visit; there is no need to come to the facility.            Mar 05, 2018  4:00 PM CST    Telephone Visit with ANTHONY Antonio CNP   [facility] ([facility])    [address]  [address]  [facility] [address] [phone]           Note: this is not an onsite visit; there is no need to come to the facility.              Who to contact     If you have questions or need follow up information about today's clinic visit or your schedule please contact [facility] directly at [phone].  Normal or non-critical lab and imaging results will be communicated to you by Sparktrendhart, letter or phone within 4 business days after the clinic has received the results. If you do not hear from us within 7 days, please contact the clinic through [employer] or phone. If you have a critical or abnormal lab result, we will notify you by phone as soon as possible.  Submit refill requests through [employer] or call your pharmacy and they will forward the refill request to us. Please allow 3 business days for your refill to be completed.          Additional Information About Your Visit        My[employer] Information     [employer] gives you secure access to your electronic health record. If you see a primary care provider, you can also send messages to your care team and make appointments. If you have questions, please call your primary care clinic.  If you do not have a primary care provider, please call [phone] and they will assist you.        Care EveryWhere ID     This is your Care EveryWhere ID. This could be used by other organizations to access your [place] medical records  [id number]         Blood Pressure from Last 3 Encounters:   01/25/18 137/63   01/15/18 156/70   12/25/17 134/51    Weight from Last 3 Encounters:   01/25/18 233 lb 7.5 oz (105.9 kg)   01/14/18 226 lb 14.4 oz (102.9 kg)   12/23/17 238 lb (108 kg)              Today, you had the following     No orders found for display         Today's Medication Changes           These changes are accurate as of 2/12/18  4:04 PM.  If you have any questions, ask your nurse or doctor.               These medicines have changed or have updated prescriptions.        Dose/Directions    LANTUS SOLOSTAR 100 UNIT/ML injection   This may have changed:  how much to take   Used for:  Type 2 diabetes mellitus with diabetic polyneuropathy, with long-term current use of insulin (H)   Generic drug:  insulin glargine        Dose:  16 Units   Inject 16 Units Subcutaneous daily   Quantity:  15 mL   Refills:  11                Primary Care Provider Office Phone # Fax #    Marivel Naomi Barcenas, APRN [facility] [phone]       [address] [facility] [address]  [facility] [address]        Equal Access to Services     CARLOS ENRIQUE GREEN : Hadii jairo carranza Sokaleigh, waaxda randqadaha, qaybta kaalmada adeegyada, jessica aguilar . So [facility] [phone].    ATENCIÓN: Si habla español, tiene a peguero disposición servicios gratuitos de asistencia lingüística. Llame al [phone].    We comply with applicable federal civil rights laws and [place] laws. We do not discriminate on the basis of race, color, national origin, age, disability, sex, sexual orientation, or gender identity.            Thank you!     Thank you for choosing [facility]  for your care. Our goal is always to provide you with excellent care. Hearing back from our patients is one way we can continue to improve our services. Please take a few minutes to complete the written survey that you may receive in the mail after your visit with us. Thank you!             Your Updated Medication List - Protect others around you: Learn how to safely use, store and throw away your medicines at www.disposemymeds.org.          This list is accurate as of 2/12/18  4:04 PM.  Always use your most recent med list.                   Brand Name Dispense Instructions for use Diagnosis    ACCU-CHEK COMPACT PLUS test strip    Generic drug:  blood glucose monitoring     100 strip    Use to test blood sugar 3 times daily or as directed.  Ok to substitute alternative if insurance prefers.        ACETAMINOPHEN PO      Take 325-650 mg by mouth every 4 hours as needed.        aspirin 81 MG chewable tablet     90 tablet    Take 1 tablet (81 mg) by mouth daily Starting 1 week after your kidney biopsy    Pulmonary embolism and infarction (H), Diabetes mellitus with background retinopathy (H)       baclofen 10 MG tablet    LIORESAL    90 tablet    Take 1 tablet (10 mg) by mouth 3 times daily as needed for muscle spasms        cloNIDine 0.1 MG tablet    CATAPRES    180 tablet    TAKE ONE TABLET BY MOUTH TWICE A DAY    HTN, goal below 140/90       Cranberry 400 MG Tabs      Take 400 mg by mouth 2 times daily        cyanocobalamin 1000 MCG/ML injection    VITAMIN B12    3 mL    Inject 1 mL (1,000 mcg) into the muscle every 30 days    Iron deficiency anemia, unspecified iron deficiency anemia type       diphenoxylate-atropine 2.5-0.025 MG per tablet    LOMOTIL    30 tablet    Take 1 tablet by mouth 4 times daily as needed for diarrhea    Diarrhea of presumed infectious origin       famotidine 20 MG tablet    PEPCID    60 tablet    Take 1 tablet (20 mg) by mouth 2 times daily        gabapentin 300 MG capsule    NEURONTIN    360 capsule    Take 2 capsules (600 mg) by mouth 2 times daily For pain    Diabetic polyneuropathy associated with type 2 diabetes mellitus (H)       insulin pen needle 30G X 8 MM     100 each    Use as directed with Андрей    Type 2 diabetes mellitus with diabetic nephropathy (H)       labetalol 300 MG tablet    NORMODYNE    120 tablet    Take 150 mg by mouth 2 times daily Take 1/2 tablet (150mg) =300mg. Hold for systolic BP less than 120.    Benign essential hypertension, Kidney replaced by transplant       lactobacillus rhamnosus (GG) capsule     60 capsule    Take 1 capsule by mouth 2 times daily    Diarrhea       LANTUS  SOLOSTAR 100 UNIT/ML injection   Generic drug:  insulin glargine     15 mL    Inject 16 Units Subcutaneous daily    Type 2 diabetes mellitus with diabetic polyneuropathy, with long-term current use of insulin (H)       levETIRAcetam 1000 MG Tabs     60 tablet    Take 1,000 mg by mouth 2 times daily    Encephalopathy acute, Status epilepticus (H)       levothyroxine 50 MCG tablet    SYNTHROID/LEVOTHROID    90 tablet    TAKE ONE TABLET BY MOUTH EVERY DAY    Other specified hypothyroidism       lidocaine 2 % topical gel    XYLOCAINE    30 mL    Apply topically as needed for moderate pain 10 ML every 3 weeks    Johnson catheter in place       loratadine 10 MG tablet    CLARITIN    90 tablet    Take 1 tablet (10 mg) by mouth daily    Acute nasopharyngitis       NovoLOG FLEXPEN 100 UNIT/ML injection   Generic drug:  insulin aspart     15 mL    Inject 2-8u sliding scale with meals and 1-4u sliding scale at bedtime    Type 2 diabetes mellitus with diabetic polyneuropathy, with long-term current use of insulin (H)       order for DME     3 catheter    Equipment being ordered: Johnson Catheters - 18 french    Johnson catheter status       * order for DME     100 pad    Equipment being ordered: Blue Perx Please send to [facility]    Fecal incontinence       * order for DME     1 each    Equipment being ordered: Ahuja FX CPAP interface (nasal pillows), size XS.    Obstructive sleep apnea syndrome       * order for DME     12 each    Equipment being ordered: Silver Impregnated catheters HX of frequent UTI    Recurrent UTI       * order for DME     1 Device    High back guerrero wheel chair MICHELLE 99 DX: morbid obesity, generalized weakness, physical deconditioning, chronic vertigo    Morbid obesity, unspecified obesity type (H), Vertigo, Generalized muscle weakness, Physical deconditioning       pravastatin 10 MG tablet    PRAVACHOL    90 tablet    TAKE ONE TABLET BY MOUTH EVERY DAY    Hyperlipidemia LDL goal <100       predniSONE 5 MG  tablet    DELTASONE    90 tablet    Take 1 tablet (5 mg) by mouth daily    Kidney replaced by transplant       simethicone 125 MG Chew chewable tablet    MYLICON    120 tablet    Take 1 tablet (125 mg) by mouth as needed for intestinal gas        TACROLIMUS PO      Take 2.5 mg by mouth 2 times daily        VITAMIN D (CHOLECALCIFEROL) PO      Take 4,000 Units by mouth 2 times daily        * Notice:  This list has 4 medication(s) that are the same as other medications prescribed for you. Read the directions carefully, and ask your doctor or other care provider to review them with you.

## 2018-02-13 ENCOUNTER — MEDICAL CORRESPONDENCE (OUTPATIENT)
Dept: HEALTH INFORMATION MANAGEMENT | Facility: CLINIC | Age: 73
End: 2018-02-13

## 2018-02-13 ENCOUNTER — TELEPHONE (OUTPATIENT)
Dept: TRANSPLANT | Facility: CLINIC | Age: 73
End: 2018-02-13

## 2018-02-13 DIAGNOSIS — N39.0 RECURRENT UTI: Primary | ICD-10-CM

## 2018-02-13 LAB
ANION GAP SERPL CALCULATED.3IONS-SCNC: 6 MMOL/L (ref 3–14)
BUN SERPL-MCNC: 46 MG/DL (ref 7–30)
CALCIUM SERPL-MCNC: 8.7 MG/DL (ref 8.5–10.1)
CHLORIDE SERPL-SCNC: 107 MMOL/L (ref 94–109)
CO2 SERPL-SCNC: 23 MMOL/L (ref 20–32)
CREAT SERPL-MCNC: 1.5 MG/DL (ref 0.52–1.04)
GFR SERPL CREATININE-BSD FRML MDRD: 34 ML/MIN/1.7M2
GLUCOSE SERPL-MCNC: 325 MG/DL (ref 70–99)
POTASSIUM SERPL-SCNC: 4.9 MMOL/L (ref 3.4–5.3)
SODIUM SERPL-SCNC: 136 MMOL/L (ref 133–144)

## 2018-02-13 PROCEDURE — 80048 BASIC METABOLIC PNL TOTAL CA: CPT

## 2018-02-13 NOTE — TELEPHONE ENCOUNTER
Writer called wilfredo with [facility] and gave Order for Stat BMP.  Wilfredo stated that it will be either today or tomorrow that they can get this completed.      Writer called and spoke with Chang and Gem and informed of the POC.  Both Chang and Gem verbalized understanding.    Taqueria Araujo RN

## 2018-02-13 NOTE — TELEPHONE ENCOUNTER
UC in now and consulted with ID Dr. Mckenzie who saw her in her last hospitalization, recommendation is to treat with Nitrofurantoin for 7 days,  However medication is contraindicated given her last creatinine clearance based on BMP from 2/6.  TC to transplant team to get their feedback, message left.  At this point will repeat BMP and await results before treating unless nephrology give the ok.  Please contact her [facility] nurse and let her know we need a Stat BMP, please update the patient and her  of the plan.    Klaudia MTZ

## 2018-02-13 NOTE — TELEPHONE ENCOUNTER
Porsha Silva RN Spong, Richard Steven, MD                     Got call from PCP Marivel Barcenas looking for recommendations on treating UTI.     Patient has had multiple recent hospitalizations with urosepsis and AMS. Discharged a few weeks ago. Recent UA results in EPIC:     It does not look like a new infection but she grew out: Culture Micro (Abnormal) 02/09/2018  3:30 PM 76   >100,000 colonies/mL   Citrobacter freundii complex wed     ID doc that saw her inpatient at [facility] recommended 7 days nitrofurantoin. PCP questioning if this is ok with her current Cr up to 1.43. Trying to get repeat BMP today. She has no signs of UTI other than cloudy urine, however she has a history of advancing to sepsis quickly.     PCP Klaudia phone # is [phone]

## 2018-02-14 NOTE — TELEPHONE ENCOUNTER
Incoming call from Yashira with  home care, she wanted to know if Klaudia had order prograf labs, there's no orders EPIC but pt's  stated that this lab was order per Klaudia request.  Also she want to know what antibiotics we are prescribing for pt's UTI.  Yashira contact info: [phone]      Rich Henderson

## 2018-02-14 NOTE — TELEPHONE ENCOUNTER
Labs reviewed and patients kidney status is slightly worse, I have not heard back from her nephrologist about treating and since she is asymptomatic and there is no indication of a new infection,  no antibiotics will be prescribed at this time.  Prograf labs are not ordered by me but by her transplant team.  I will forward this message to them and they can place the orders. Please let Yashira and the patient know.    Klaudia MTZ

## 2018-02-14 NOTE — TELEPHONE ENCOUNTER
Please see message below from home care nurse. No result note in chart or order for prograf labs.  Cara Blevins RN

## 2018-02-15 ENCOUNTER — TELEPHONE (OUTPATIENT)
Dept: FAMILY MEDICINE | Facility: CLINIC | Age: 73
End: 2018-02-15

## 2018-02-15 ENCOUNTER — ALLIED HEALTH/NURSE VISIT (OUTPATIENT)
Dept: PHARMACY | Facility: CLINIC | Age: 73
End: 2018-02-15
Payer: COMMERCIAL

## 2018-02-15 DIAGNOSIS — R19.7 DIARRHEA, UNSPECIFIED TYPE: ICD-10-CM

## 2018-02-15 DIAGNOSIS — Z94.0 KIDNEY REPLACED BY TRANSPLANT: ICD-10-CM

## 2018-02-15 DIAGNOSIS — Z79.4 TYPE 2 DIABETES MELLITUS WITH DIABETIC POLYNEUROPATHY, WITH LONG-TERM CURRENT USE OF INSULIN (H): Primary | ICD-10-CM

## 2018-02-15 DIAGNOSIS — E11.42 TYPE 2 DIABETES MELLITUS WITH DIABETIC POLYNEUROPATHY, WITH LONG-TERM CURRENT USE OF INSULIN (H): Primary | ICD-10-CM

## 2018-02-15 PROCEDURE — 99607 MTMS BY PHARM ADDL 15 MIN: CPT | Performed by: PHARMACIST

## 2018-02-15 PROCEDURE — 99606 MTMS BY PHARM EST 15 MIN: CPT | Performed by: PHARMACIST

## 2018-02-15 RX ORDER — CEFDINIR 300 MG/1
300 CAPSULE ORAL DAILY
Qty: 7 CAPSULE | Refills: 0 | Status: SHIPPED | OUTPATIENT
Start: 2018-02-15 | End: 2019-02-12

## 2018-02-15 NOTE — TELEPHONE ENCOUNTER
RX signed, please call patient and  let them know we will start an antibiotic for her UTI based on recommendation from Nephrology.  RX sent to [facility] on [address].    Klaudia MTZ

## 2018-02-15 NOTE — PATIENT INSTRUCTIONS
Recommendations from today's MTM visit:                                                        1. Increase Lantus to 18 units once a day    2. For Lomotil:  - at 3rd episode of diarrhea, start Lomotil 1 tablet  - continue every 4-6 hours (up to 4 tablets in a day) until control is achieved  - call the clinic if diarrhea persists 3+ days    Next MTM visit: I'll call again in 2 weeks    To schedule another MTM appointment, please call the clinic directly or you may call the MTM scheduling line at [phone] or toll-free at [phone].     My Clinical Pharmacist's contact information:                                                      It was a pleasure speaking with you today!  Please feel free to contact me with any questions or concerns you have.      Conchita Toledo, PharmD, [facility]   [phone]    You may receive a survey about the MTM services you received.  I would appreciate your feedback to help me serve you better in the future. Please fill it out and return it when you can. Your comments will be anonymous.

## 2018-02-15 NOTE — PROGRESS NOTES
SUBJECTIVE/OBJECTIVE:                Gem Jade is a 72 year old female called for a follow-up visit for Medication Therapy Management.  She was referred to me from Marivel Barcenas.  Keshawn also on the phone.     Chief Complaint: Follow up from our visit on 2/12/18.  Diabetes f/u     Tobacco: History of tobacco dependence - quit   Alcohol: not currently using    Medication Adherence: no issues found and has assistance setting up med boxes (HCRN)    Diarrhea: Recent diarrhea on 2/10-11 and has resolved. Took lomotil 1 tab x3 doses in a 12-hour period over the weekend.   continues to be frustrated in regards to when to give Lomotil. Receiving conflicting information from different providers.    Diabetes:  Pt currently taking Lantus 14u daily (did not increase to 16u). Novolog 4-8u BID sliding scale. Lantus was held during last hospitalization due to concern of hypoglycemia contributing to encephalopathy. Pt is not experiencing side effects.  SMBG: two times daily.   Ranges (patient reported):   Date FBG/ 2hours post Lunch/2hours post Dinner /2hours post bedtime   2/14 312  262    2/13 335  302     212  420                                   Patient is not experiencing hypoglycemia  Recent symptoms of high blood sugar? fatigue  Eye exam: due  Foot exam: due  Microalbumin is not < 30 mg/g. Pt is not taking an ACEi/ARB.  Aspirin: Taking 81mg daily and denies side effects  Diet/Exercise: eating soft foods, fish, potatoes    S/p kidney transplant: currently taking tacrolimus 2.5mg BID and prednisone 5mg daily. No current SE. Tac levels have been followed by transplant clinic, last checked 2/6.  Discussed potential food interactions with tacrolimus and pt never eats within hours of taking her medication.     Current labs include:  BP Readings from Last 3 Encounters:   01/25/18 137/63   01/15/18 156/70   12/25/17 134/51     Today's Vitals: There were no vitals taken for this visit. - phone  call  Lab Results   Component Value Date    A1C 6.3 01/12/2018   .  Lab Results   Component Value Date    CHOL 164 12/05/2016     Lab Results   Component Value Date    TRIG 272 12/05/2016     Lab Results   Component Value Date    HDL 46 12/05/2016     Lab Results   Component Value Date    LDL 64 12/05/2016       Liver Function Studies -   Recent Labs   Lab Test  01/20/18   0720   PROTTOTAL  7.1   ALBUMIN  2.6*   BILITOTAL  0.4   ALKPHOS  106   AST  22   ALT  20       Lab Results   Component Value Date    UCRR 43 11/16/2017    MICROL 1850 04/26/2017    UMALCR 6776.56 (H) 04/26/2017       Last Basic Metabolic Panel:  Lab Results   Component Value Date     02/13/2018      Lab Results   Component Value Date    POTASSIUM 4.9 02/13/2018     Lab Results   Component Value Date    CHLORIDE 107 02/13/2018     Lab Results   Component Value Date    BUN 46 02/13/2018     Lab Results   Component Value Date    CR 1.50 02/13/2018     GFR Estimate   Date Value Ref Range Status   02/13/2018 34 (L) >60 mL/min/1.7m2 Final     Comment:     Non  GFR Calc   02/06/2018 36 (L) >60 mL/min/1.7m2 Final     Comment:     Non  GFR Calc   01/24/2018 48 (L) >60 mL/min/1.7m2 Final     Comment:     Non  GFR Calc     GFR Estimate If Black   Date Value Ref Range Status   02/13/2018 41 (L) >60 mL/min/1.7m2 Final     Comment:      GFR Calc   02/06/2018 44 (L) >60 mL/min/1.7m2 Final     Comment:      GFR Calc   01/24/2018 59 (L) >60 mL/min/1.7m2 Final     Comment:      GFR Calc     TSH   Date Value Ref Range Status   01/19/2018 1.31 0.40 - 4.00 mU/L Final   ]    Most Recent Immunizations   Administered Date(s) Administered     HepB 01/13/2003     Influenza (H1N1) 11/25/2009     Influenza (High Dose) 3 valent vaccine 10/07/2016     Influenza (IIV3) PF 11/05/2011     Influenza Vaccine IM 3yrs+ 4 Valent IIV4 10/16/2013     Pneumo Conj 13-V (2010&after)  12/01/2015     Pneumococcal 23 valent 06/17/2013     TDAP Vaccine (Adacel) 06/17/2013     Zoster vaccine, live 06/17/2013       ASSESSMENT:              Current medications were reviewed today as discussed above.      Medication Adherence: no issues identified    diarrhea: stable. Discussed parameters to give Lomotil with PCP and relayed information to pt/. At 3rd episode of loose/watery stool, start Lomotil 1 tab Q4-6 hours. Continue to take until control is achieved. Call clinic if diarrhea persists 3+ days.    diabetes: needs improvement. A1c is at goal <8% but BS remain in 200-300s. Will continue to titrate Lantus    S/p kidney transplant: stable. Last tac levels wnl. No concerns with medication administration; continue to take on empty stomach.        PLAN:                  Increase Lantus to 18 units daily    I spent 20 minutes with this patient today. All changes were made via collaborative practice agreement with Marivel Barcenas. A copy of the visit note was provided to the patient's primary care provider.     Will follow up in 2 weeks.    The patient was mailed a summary of these recommendations as an after visit summary.    Conchita Toledo, PharmD, BCACP

## 2018-02-15 NOTE — MR AVS SNAPSHOT
After Visit Summary   2/15/2018    Gem Jade    MRN: [id number]           Patient Information     Date Of Birth          1945        Visit Information        Provider Department      2/15/2018 3:30 PM Conchita Toledo, [facility]TM        Today's Diagnoses     Type 2 diabetes mellitus with diabetic polyneuropathy, with long-term current use of insulin (H)    -  1    Diarrhea, unspecified type        Kidney replaced by transplant          Care Instructions    Recommendations from today's MTM visit:                                                        1. Increase Lantus to 18 units once a day    2. For Lomotil:  - at 3rd episode of diarrhea, start Lomotil 1 tablet  - continue every 4-6 hours (up to 4 tablets in a day) until control is achieved  - call the clinic if diarrhea persists 3+ days    Next MTM visit: I'll call again in 2 weeks    To schedule another MTM appointment, please call the clinic directly or you may call the MTM scheduling line at [phone] or toll-free at [phone].     My Clinical Pharmacist's contact information:                                                      It was a pleasure speaking with you today!  Please feel free to contact me with any questions or concerns you have.      Conchita Toledo, PharmD, [facility]   [phone]    You may receive a survey about the MT services you received.  I would appreciate your feedback to help me serve you better in the future. Please fill it out and return it when you can. Your comments will be anonymous.                  Follow-ups after your visit        Your next 10 appointments already scheduled     Feb 27, 2018  3:30 PM CST   TELEMEDICINE with Conchita Toledo [facility] ([facility])    [address] [place] [id number]   [phone]           Note: this is not an onsite  visit; there is no need to come to the facility.            Mar 05, 2018  4:00 PM CST   Telephone Visit with ANTHONY Antonio CNP   [facility]    [address]  [address]  [facility] [id number] [phone]           Note: this is not an onsite visit; there is no need to come to the facility.              Who to contact     If you have questions or need follow up information about today's clinic visit or your schedule please contact [facility]TM directly at [phone].  Normal or non-critical lab and imaging results will be communicated to you by MyChart, letter or phone within 4 business days after the clinic has received the results. If you do not hear from us within 7 days, please contact the clinic through [email or web address] or phone. If you have a critical or abnormal lab result, we will notify you by phone as soon as possible.  Submit refill requests through [employer] or call your pharmacy and they will forward the refill request to us. Please allow 3 business days for your refill to be completed.          Additional Information About Your Visit        MyChart Information     [employer] gives you secure access to your electronic health record. If you see a primary care provider, you can also send messages to your care team and make appointments. If you have questions, please call your primary care clinic.  If you do not have a primary care provider, please call [phone] and they will assist you.        Care EveryWhere ID     This is your Care EveryWhere ID. This could be used by other organizations to access your [place] medical records  [id number]         Blood Pressure from Last 3 Encounters:   01/25/18 137/63   01/15/18 156/70   12/25/17 134/51    Weight from Last 3 Encounters:   01/25/18 233 lb 7.5 oz (105.9 kg)   01/14/18 226 lb 14.4 oz (102.9 kg)   12/23/17 238 lb (108 kg)               Today, you had the following     No orders found for display         Today's Medication Changes          These changes are accurate as of 2/15/18  4:49 PM.  If you have any questions, ask your nurse or doctor.               These medicines have changed or have updated prescriptions.        Dose/Directions    LANTUS SOLOSTAR 100 UNIT/ML injection   This may have changed:  how much to take   Used for:  Type 2 diabetes mellitus with diabetic polyneuropathy, with long-term current use of insulin (H)   Generic drug:  insulin glargine   Changed by:  Conchita Toledo [facility]        Dose:  18 Units   Inject 18 Units Subcutaneous daily   Quantity:  15 mL   Refills:  11                Primary Care Provider Office Phone # Fax #    Marivel Salgado TommyangelikaKeilyLisandromon, APRN [facility] [phone]       [address] [facility] [address]  [facility] [address]        Equal Access to Services     [facility] Center[facility]LE[facility]AN : Hadii jairo Goodman, waaxda luqadaha, qaybta kaalmada tony, jessica aguilar . So [facility] [phone].    ATENCIÓN: Si habla español, tiene a peguero disposición servicios gratuitos de asistencia lingüística. Carmine al [phone].    We comply with applicable federal civil rights laws and [place] laws. We do not discriminate on the basis of race, color, national origin, age, disability, sex, sexual orientation, or gender identity.            Thank you!     Thank you for choosing [facility] MT  for your care. Our goal is always to provide you with excellent care. Hearing back from our patients is one way we can continue to improve our services. Please take a few minutes to complete the written survey that you may receive in the mail after your visit with us. Thank you!             Your Updated Medication List - Protect others around you: Learn how to safely use, store and throw away your medicines at www.disposemymeds.org.          This list is accurate as of 2/15/18   4:49 PM.  Always use your most recent med list.                   Brand Name Dispense Instructions for use Diagnosis    ACCU-CHEK COMPACT PLUS test strip   Generic drug:  blood glucose monitoring     100 strip    Use to test blood sugar 3 times daily or as directed.  Ok to substitute alternative if insurance prefers.        ACETAMINOPHEN PO      Take 325-650 mg by mouth every 4 hours as needed.        aspirin 81 MG chewable tablet     90 tablet    Take 1 tablet (81 mg) by mouth daily Starting 1 week after your kidney biopsy    Pulmonary embolism and infarction (H), Diabetes mellitus with background retinopathy (H)       baclofen 10 MG tablet    LIORESAL    90 tablet    Take 1 tablet (10 mg) by mouth 3 times daily as needed for muscle spasms        cefdinir 300 MG capsule    OMNICEF    7 capsule    Take 1 capsule (300 mg) by mouth daily for 7 days    Recurrent UTI       cloNIDine 0.1 MG tablet    CATAPRES    180 tablet    TAKE ONE TABLET BY MOUTH TWICE A DAY    HTN, goal below 140/90       Cranberry 400 MG Tabs      Take 400 mg by mouth 2 times daily        cyanocobalamin 1000 MCG/ML injection    VITAMIN B12    3 mL    Inject 1 mL (1,000 mcg) into the muscle every 30 days    Iron deficiency anemia, unspecified iron deficiency anemia type       diphenoxylate-atropine 2.5-0.025 MG per tablet    LOMOTIL    30 tablet    Take 1 tablet by mouth 4 times daily as needed for diarrhea    Diarrhea of presumed infectious origin       famotidine 20 MG tablet    PEPCID    60 tablet    Take 1 tablet (20 mg) by mouth 2 times daily        gabapentin 300 MG capsule    NEURONTIN    360 capsule    Take 2 capsules (600 mg) by mouth 2 times daily For pain    Diabetic polyneuropathy associated with type 2 diabetes mellitus (H)       insulin pen needle 30G X 8 MM     100 each    Use as directed with Андрей    Type 2 diabetes mellitus with diabetic nephropathy (H)       labetalol 300 MG tablet    NORMODYNE    120 tablet    Take 150 mg by  mouth 2 times daily Take 1/2 tablet (150mg) =300mg. Hold for systolic BP less than 120.    Benign essential hypertension, Kidney replaced by transplant       lactobacillus rhamnosus (GG) capsule     60 capsule    Take 1 capsule by mouth 2 times daily    Diarrhea       LANTUS SOLOSTAR 100 UNIT/ML injection   Generic drug:  insulin glargine     15 mL    Inject 18 Units Subcutaneous daily    Type 2 diabetes mellitus with diabetic polyneuropathy, with long-term current use of insulin (H)       levETIRAcetam 1000 MG Tabs     60 tablet    Take 1,000 mg by mouth 2 times daily    Encephalopathy acute, Status epilepticus (H)       levothyroxine 50 MCG tablet    SYNTHROID/LEVOTHROID    90 tablet    TAKE ONE TABLET BY MOUTH EVERY DAY    Other specified hypothyroidism       lidocaine 2 % topical gel    XYLOCAINE    30 mL    Apply topically as needed for moderate pain 10 ML every 3 weeks    Johnson catheter in place       loratadine 10 MG tablet    CLARITIN    90 tablet    Take 1 tablet (10 mg) by mouth daily    Acute nasopharyngitis       NovoLOG FLEXPEN 100 UNIT/ML injection   Generic drug:  insulin aspart     15 mL    Inject 2-8u sliding scale with meals and 1-4u sliding scale at bedtime    Type 2 diabetes mellitus with diabetic polyneuropathy, with long-term current use of insulin (H)       order for DME     3 catheter    Equipment being ordered: Johnson Catheters - 18 french    Johnson catheter status       * order for DME     100 pad    Equipment being ordered: Blue Chux Please send to [facility]    Fecal incontinence       * order for DME     1 each    Equipment being ordered: Ahuja FX CPAP interface (nasal pillows), size XS.    Obstructive sleep apnea syndrome       * order for DME     12 each    Equipment being ordered: Silver Impregnated catheters HX of frequent UTI    Recurrent UTI       * order for DME     1 Device    High back guerrero wheel chair MICHELLE 99 DX: morbid obesity, generalized weakness, physical deconditioning,  chronic vertigo    Morbid obesity, unspecified obesity type (H), Vertigo, Generalized muscle weakness, Physical deconditioning       pravastatin 10 MG tablet    PRAVACHOL    90 tablet    TAKE ONE TABLET BY MOUTH EVERY DAY    Hyperlipidemia LDL goal <100       predniSONE 5 MG tablet    DELTASONE    90 tablet    Take 1 tablet (5 mg) by mouth daily    Kidney replaced by transplant       simethicone 125 MG Chew chewable tablet    MYLICON    120 tablet    Take 1 tablet (125 mg) by mouth as needed for intestinal gas        TACROLIMUS PO      Take 2.5 mg by mouth 2 times daily        VITAMIN D (CHOLECALCIFEROL) PO      Take 4,000 Units by mouth 2 times daily        * Notice:  This list has 4 medication(s) that are the same as other medications prescribed for you. Read the directions carefully, and ask your doctor or other care provider to review them with you.

## 2018-02-15 NOTE — TELEPHONE ENCOUNTER
Writer called and updated Yashira and Pt.    Yashira is asking if it would be a good idea or not to give Pt a prophylactic Abx prior to cath changes because this is when problems usually happen.    Taqueria Araujo RN

## 2018-02-15 NOTE — TELEPHONE ENCOUNTER
Will route Dr. Elizabeth response to PharmD for dosing.  She is having a phone call with the family today  Klaudia Swapna [facility]      Please see the message back from Dr. Elizabeth on pt UTI recommendations. Thanks!     Lisha Pimentel RN   Transplant Coordinator - [facility]   Patient Care Supervisor - [facility]            Previous Messages       ----- Message -----      From: Ross Elizabeth MD      Sent: 2/14/2018   2:05 PM        To: Porsha Silva RN   Subject: RE: UTI                                           Cefdinir would be okay as it is the oral equivalent to cefipime.  Could also consider ciprofloxacin.  Her allergy is to levofloxacin and allergy list states it is hallucinations.     Will need to be dosed for a GFR 20-30 ml/min.     Hope that helps.     Luis

## 2018-02-15 NOTE — TELEPHONE ENCOUNTER
Reviewed dosing, recommended for cefdinir 300mg once daily for CrCl<30ml/min.     Pended Rx and routed to PCP    Conchita Toledo, VivianD, BCACP

## 2018-02-16 NOTE — TELEPHONE ENCOUNTER
Yashira [facility] RN called writer back.  Yashira informed of POC with Abx recommendations from Nephrology.    Yashira will discuss with Pt and Pt's .    Taqueria Araujo RN

## 2018-02-19 ENCOUNTER — TELEPHONE (OUTPATIENT)
Dept: FAMILY MEDICINE | Facility: CLINIC | Age: 73
End: 2018-02-19

## 2018-02-19 DIAGNOSIS — R19.7 DIARRHEA OF PRESUMED INFECTIOUS ORIGIN: ICD-10-CM

## 2018-02-20 ENCOUNTER — TELEPHONE (OUTPATIENT)
Dept: FAMILY MEDICINE | Facility: CLINIC | Age: 73
End: 2018-02-20

## 2018-02-20 DIAGNOSIS — R21 RASH AND NONSPECIFIC SKIN ERUPTION: Primary | ICD-10-CM

## 2018-02-20 RX ORDER — DIPHENOXYLATE HCL/ATROPINE 2.5-.025MG
1 TABLET ORAL 4 TIMES DAILY PRN
Qty: 30 TABLET | Refills: 0 | Status: SHIPPED | OUTPATIENT
Start: 2018-02-20 | End: 2018-03-09

## 2018-02-20 NOTE — TELEPHONE ENCOUNTER
Yashira called back.      Pt has had a rash for a while.  Pallavi believes that it's from the barrier cream drying iher out too much, it has coritic in it.      Yashira asked for a steroid cream for rash area/.    Nurse stated that it looks like dermatitis.    Rash is painful for Pt.    Taqueria Araujo RN

## 2018-02-20 NOTE — TELEPHONE ENCOUNTER
Incoming call from Yashira with  home care, pt has a rash on her bottom and the barrier cream isn't working.  Yashira would like to know what else can pt use for her rash.   Yashira contact info: [phone]      Rich Henderson

## 2018-02-20 NOTE — TELEPHONE ENCOUNTER
Please call the patient and try to get some more information on the rash, how long has she had it, is it itchy, painful, what does it look like, she recently started a new antibiotic    Klaudia MCFARLANEP

## 2018-02-20 NOTE — TELEPHONE ENCOUNTER
I have prescribed a compounded product for her and sent it to the  speciality pharmacy, it contains hydrocortisone and Eucerin, please only use it 2x daily as a steroid cream can also be drying, please call the patient and her nurse and let them know.    Klaudia MCFARLANEP

## 2018-02-22 ENCOUNTER — TELEPHONE (OUTPATIENT)
Dept: FAMILY MEDICINE | Facility: CLINIC | Age: 73
End: 2018-02-22

## 2018-02-22 NOTE — TELEPHONE ENCOUNTER
Reason for Call:  Home Health Care    Yashira with [facility] called regarding orders for:    PT:  eval and treat     OT: eval and treat     Pt Provider:  Klaudia Barcenas    Phone Number Homecare Nurse can be reached at: [phone]    Can we leave a detailed message on this number? YES    Phone number patient can be reached at: Home number on file [phone] (home)    Best Time: anytime    Call taken on 2/22/2018 at 3:39 PM by Rich Henderson

## 2018-02-23 NOTE — TELEPHONE ENCOUNTER
Incoming call from Kashif, pharmacist with  Specialty pharmacy, requesting a verbal approval to dispense medication. Kashif states that they did not receive the script.     Please contact pharmacy at [phone]    Paige Molina

## 2018-02-26 ENCOUNTER — TELEPHONE (OUTPATIENT)
Dept: FAMILY MEDICINE | Facility: CLINIC | Age: 73
End: 2018-02-26

## 2018-02-26 NOTE — TELEPHONE ENCOUNTER
Reason for call:  Patient reporting a symptom    Symptom or request: diarrhea    Duration (how long have symptoms been present): not sure    Have you been treated for this before? Yes    Additional comments: please call  back    Phone Number patient can be reached at:  Home number on file [phone] (home)    Best Time:  Anytime, today hopefully    Can we leave a detailed message on this number:  YES    Call taken on 2/26/2018 at 3:44 PM by Margaret Hoover

## 2018-02-26 NOTE — TELEPHONE ENCOUNTER
Called pt and  back.      Abx finished on Thursday.      Diarrhea started today, Pt has had 5 episodes this afternoon.      Pt has taken Lomotil one tablet with each Antibiotic.  Writer encouraged pt to increase Lomotil to 4 times per day as ordered until Diarrhea is under control.      Encouraged pt to watch fluid status so she doesn't become dehydrated.      Will forward to PCP as FYI and to see if she would like to do anything else at this point?    Taqueria Araujo RN

## 2018-02-26 NOTE — TELEPHONE ENCOUNTER
Incoming call from  Compounded phar they need to know why this cream (Hyrocortisone 1%/Eucerin 1%) needs to be compounded when it's available commercially.   compounded phar: [phone]      Rich Henderson

## 2018-02-26 NOTE — TELEPHONE ENCOUNTER
Recent conversation with  about use of Lomtil, see pharmD note below: from 2/15/18, no other treatment at this time except to increase her probiotic to 3x daily.     diarrhea: stable. Discussed parameters to give Lomotil with PCP and relayed information to pt/. At 3rd episode of loose/watery stool, start Lomotil 1 tab Q4-6 hours. Continue to take until control is achieved. Call clinic if diarrhea persists 3+ days.    Klaudia MTZ

## 2018-02-26 NOTE — TELEPHONE ENCOUNTER
Writer called pharmacy back.  Pharmacy stated that this is available in a non-compounded Rx.  Writer OK'd to deliver in non-Compounded form.    Taqueria Araujo RN

## 2018-02-27 ENCOUNTER — ALLIED HEALTH/NURSE VISIT (OUTPATIENT)
Dept: PHARMACY | Facility: CLINIC | Age: 73
End: 2018-02-27
Payer: COMMERCIAL

## 2018-02-27 DIAGNOSIS — E11.9 DIABETES MELLITUS, TYPE 2 (H): ICD-10-CM

## 2018-02-27 DIAGNOSIS — R19.7 DIARRHEA, UNSPECIFIED TYPE: Primary | ICD-10-CM

## 2018-02-27 DIAGNOSIS — Z79.4 TYPE 2 DIABETES MELLITUS WITH COMPLICATION, WITH LONG-TERM CURRENT USE OF INSULIN (H): ICD-10-CM

## 2018-02-27 DIAGNOSIS — E11.8 TYPE 2 DIABETES MELLITUS WITH COMPLICATION, WITH LONG-TERM CURRENT USE OF INSULIN (H): ICD-10-CM

## 2018-02-27 PROCEDURE — 99607 MTMS BY PHARM ADDL 15 MIN: CPT | Performed by: PHARMACIST

## 2018-02-27 PROCEDURE — 99606 MTMS BY PHARM EST 15 MIN: CPT | Performed by: PHARMACIST

## 2018-02-27 NOTE — PROGRESS NOTES
SUBJECTIVE/OBJECTIVE:                Gem Jade is a 72 year old female called for a follow-up visit for Medication Therapy Management.  She was referred to me from Marivel Barcenas.  Keshawn not available for call today.     Chief Complaint: Follow up from our visit on 2/15/18.  Following up on SMBG and insulin dosing.    Tobacco: History of tobacco dependence - quit  Alcohol: not currently using    Medication Adherence/Access:  Patient has assistance with medication administration and setting up boxes (HCRN).    Diarrhea: Currently taking lomotil 2.5-0.025 mg tablets PRN up to 4 times daily and a probiotic three times per day.  Completed antibiotic last Thursday and started getting diarrhea again yesterday.  Per instructions from 2/26/18, pt reports that she took her first dose ~4:00 pm and another at 8:00 pm yesterday.  Did not eat this afternoon and took one additional tablet at 3:00 pm.  States she's starting to feel better and trying to stay well hydrated.  HCRN present during phone visit and confirmed that pt had good urinary output.      Diabetes: Currently taking lantus 18u subQ daily and novolog 2-8u with meals per sliding scale and 1-4u at bedtime per sliding scale.   SMBG: zero to two times daily.  Ranges (patient reported):   Date FBG/ 2hours post Lunch/2hours post Dinner /2hours post Bedtime   2/27 347      2/26    307   2/25 2/24    459   2/23 307      2/22 2/21    240     Patient is not experiencing hypoglycemia  Recent symptoms of high blood sugar? none  Eye exam: due  Foot exam: due  Microalbumin is not < 30 mg/g. Pt is not taking an ACEi/ARB.  Aspirin: Taking 81mg daily and denies side effects  Diet/Exercise: will eat soft foods but poor appetite lately due to diarrhea     Current labs include:  BP Readings from Last 3 Encounters:   01/25/18 137/63   01/15/18 156/70   12/25/17 134/51     Lab Results   Component Value Date    A1C 6.3 01/12/2018   .  Lab Results    Component Value Date    CHOL 164 12/05/2016     Lab Results   Component Value Date    TRIG 272 12/05/2016     Lab Results   Component Value Date    HDL 46 12/05/2016     Lab Results   Component Value Date    LDL 64 12/05/2016       Liver Function Studies -   Recent Labs   Lab Test  01/20/18   0720   PROTTOTAL  7.1   ALBUMIN  2.6*   BILITOTAL  0.4   ALKPHOS  106   AST  22   ALT  20       Lab Results   Component Value Date    UCRR 43 11/16/2017    MICROL 1850 04/26/2017    UMALCR 6776.56 (H) 04/26/2017       Last Basic Metabolic Panel:  Lab Results   Component Value Date     02/13/2018      Lab Results   Component Value Date    POTASSIUM 4.9 02/13/2018     Lab Results   Component Value Date    CHLORIDE 107 02/13/2018     Lab Results   Component Value Date    BUN 46 02/13/2018     Lab Results   Component Value Date    CR 1.50 02/13/2018     GFR Estimate   Date Value Ref Range Status   02/13/2018 34 (L) >60 mL/min/1.7m2 Final     Comment:     Non  GFR Calc   02/06/2018 36 (L) >60 mL/min/1.7m2 Final     Comment:     Non  GFR Calc   01/24/2018 48 (L) >60 mL/min/1.7m2 Final     Comment:     Non  GFR Calc     GFR Estimate If Black   Date Value Ref Range Status   02/13/2018 41 (L) >60 mL/min/1.7m2 Final     Comment:      GFR Calc   02/06/2018 44 (L) >60 mL/min/1.7m2 Final     Comment:      GFR Calc   01/24/2018 59 (L) >60 mL/min/1.7m2 Final     Comment:      GFR Calc     TSH   Date Value Ref Range Status   01/19/2018 1.31 0.40 - 4.00 mU/L Final   ]    Most Recent Immunizations   Administered Date(s) Administered     HepB 01/13/2003     Influenza (H1N1) 11/25/2009     Influenza (High Dose) 3 valent vaccine 10/07/2016     Influenza (IIV3) PF 11/05/2011     Influenza Vaccine IM 3yrs+ 4 Valent IIV4 10/16/2013     Pneumo Conj 13-V (2010&after) 12/01/2015     Pneumococcal 23 valent 06/17/2013     TDAP Vaccine (Adacel) 06/17/2013      Zoster vaccine, live 06/17/2013       ASSESSMENT:              Current medications were reviewed today as discussed above.      Medication Adherence: no issues identified    Diarrhea: Improving - Discussed parameters regarding use of lomotil.  Pt has completed 3 doses and reported feeling better.  She should continue to take every 4 hours until control is achieved.  Pt is also taking a probiotic and continues to stay well hydrated.      Diabetes: Needs improvement - Pt is currently meeting A1C goal of <8% but her reported blood sugars have not been less than 200s over the past 2 weeks from the limited information she could provide.  Pt would benefit from maintaining an accurate log with fasting and evening BG as well as insulin dosing while continuing current lantus and novolog dosing regimens.  No changes to insulin should be made while we await further BG and administration details over the next week.  HCRN agreeable to providing a paper logbook and will discuss with pt's  as well.      PLAN:                    1. Check BG 2-3 times daily between now and next Tuesday and keep track of the amount of each insulin dosed each day    I spent 30 minutes with this patient today. All changes were made via collaborative practice agreement with Marivel Barcenas. A copy of the visit note was provided to the patient's primary care provider.     Will follow up in 1 week.    The patient was sent via [employer] a summary of these recommendations as an after visit summary.    Nicholas Gaviria, 2018 PharmD Candidate  Conchita Toledo, PharmD, BCACP

## 2018-02-27 NOTE — MR AVS SNAPSHOT
After Visit Summary   2/27/2018    Gem Jade    MRN: [id number]           Patient Information     Date Of Birth          1945        Visit Information        Provider Department      2/27/2018 3:30 PM Conchita Toledo, [facility] MTM        Today's Diagnoses     Diarrhea, unspecified type    -  1    Diabetes mellitus, type 2 (H)        Type 2 diabetes mellitus with complication, with long-term current use of insulin (H)          Care Instructions    Recommendations from today's MTM visit:                                                        1. Please track your blood sugar and insulin doses on paper    Next MTM visit: 1 week    To schedule another MTM appointment, please call the clinic directly or you may call the MTM scheduling line at [phone] or toll-free at [phone].     My Clinical Pharmacist's contact information:                                                      It was a pleasure speaking with you today!  Please feel free to contact me with any questions or concerns you have.      Conchita Toledo, PharmD, BCACP     You may receive a survey about the MTM services you received.  I would appreciate your feedback to help me serve you better in the future. Please fill it out and return it when you can. Your comments will be anonymous.                Follow-ups after your visit        Your next 10 appointments already scheduled     Mar 05, 2018  4:00 PM CST   Telephone Visit with ANTHONY Antonio [facility] ([facility])    [address] [place] [address]  [address]  [facility] [address] [phone]           Note: this is not an onsite visit; there is no need to come to the facility.            Mar 07, 2018  3:00 PM CST   TELEMEDICINE with Conchita Toledo [facility] Primary [facility] MT ([facility])    [address]  [address]  [facility] [address] [phone]           Note: this is not an onsite visit; there is no need to come to the facility.              Who to contact     If you have questions or need follow up information about today's clinic visit or your schedule please contact [facility] PRIMARY CARE MTM directly at [phone].  Normal or non-critical lab and imaging results will be communicated to you by MyChart, letter or phone within 4 business days after the clinic has received the results. If you do not hear from us within 7 days, please contact the clinic through JoinTVhart or phone. If you have a critical or abnormal lab result, we will notify you by phone as soon as possible.  Submit refill requests through [employer] or call your pharmacy and they will forward the refill request to us. Please allow 3 business days for your refill to be completed.          Additional Information About Your Visit        JoinTVhart Information     [employer] gives you secure access to your electronic health record. If you see a primary care provider, you can also send messages to your care team and make appointments. If you have questions, please call your primary care clinic.  If you do not have a primary care provider, please call [phone] and they will assist you.        Care EveryWhere ID     This is your Care EveryWhere ID. This could be used by other organizations to access your [place] medical records  [id number]         Blood Pressure from Last 3 Encounters:   01/25/18 137/63   01/15/18 156/70   12/25/17 134/51    Weight from Last 3 Encounters:   01/25/18 233 lb 7.5 oz (105.9 kg)   01/14/18 226 lb 14.4 oz (102.9 kg)   12/23/17 238 lb (108 kg)              Today, you had the following     No orders found for display       Primary Care Provider Office Phone # Fax #    ANTHONY Antonio [facility] [phone]       [address] [facility] [address] [place] [facility]   [facility] [address]         Equal Access to Services     [facility] Center[facility]LE[facility] : Hadii jairo green tere Sokaleigh, waaxda luqadaha, qaybta kaalmajorje mckeonradhajorje, jessica roman. So [facility] [phone].    ATENCIÓN: Si habla español, tiene a peguero disposición servicios gratuitos de asistencia lingüística. Kishorame al [phone].    We comply with applicable federal civil rights laws and [place] laws. We do not discriminate on the basis of race, color, national origin, age, disability, sex, sexual orientation, or gender identity.            Thank you!     Thank you for choosing [facility] PRIMARY CARE MTM  for your care. Our goal is always to provide you with excellent care. Hearing back from our patients is one way we can continue to improve our services. Please take a few minutes to complete the written survey that you may receive in the mail after your visit with us. Thank you!             Your Updated Medication List - Protect others around you: Learn how to safely use, store and throw away your medicines at www.disposemymeds.org.          This list is accurate as of 2/27/18 11:59 PM.  Always use your most recent med list.                   Brand Name Dispense Instructions for use Diagnosis    ACCU-CHEK COMPACT PLUS test strip   Generic drug:  blood glucose monitoring     100 strip    Use to test blood sugar 3 times daily or as directed.  Ok to substitute alternative if insurance prefers.        ACETAMINOPHEN PO      Take 325-650 mg by mouth every 4 hours as needed.        aspirin 81 MG chewable tablet     90 tablet    Take 1 tablet (81 mg) by mouth daily Starting 1 week after your kidney biopsy    Pulmonary embolism and infarction (H), Diabetes mellitus with background retinopathy (H)       baclofen 10 MG tablet    LIORESAL    90 tablet    Take 1 tablet (10 mg) by mouth 3 times daily as needed for muscle spasms        cloNIDine 0.1 MG tablet    CATAPRES    180 tablet    TAKE ONE TABLET BY MOUTH TWICE A DAY    HTN,  goal below 140/90       Cranberry 400 MG Tabs      Take 400 mg by mouth 2 times daily        cyanocobalamin 1000 MCG/ML injection    VITAMIN B12    3 mL    Inject 1 mL (1,000 mcg) into the muscle every 30 days    Iron deficiency anemia, unspecified iron deficiency anemia type       diphenoxylate-atropine 2.5-0.025 MG per tablet    LOMOTIL    30 tablet    Take 1 tablet by mouth 4 times daily as needed for diarrhea    Diarrhea of presumed infectious origin       famotidine 20 MG tablet    PEPCID    60 tablet    Take 1 tablet (20 mg) by mouth 2 times daily        gabapentin 300 MG capsule    NEURONTIN    360 capsule    Take 2 capsules (600 mg) by mouth 2 times daily For pain    Diabetic polyneuropathy associated with type 2 diabetes mellitus (H)       hydrocortisone 1%/eucerin 1% compounded cream     30 g    Apply topically 2 times daily    Rash and nonspecific skin eruption       insulin pen needle 30G X 8 MM     100 each    Use as directed with Lantus    Type 2 diabetes mellitus with diabetic nephropathy (H)       labetalol 300 MG tablet    NORMODYNE    120 tablet    Take 150 mg by mouth 2 times daily Take 1/2 tablet (150mg) =300mg. Hold for systolic BP less than 120.    Benign essential hypertension, Kidney replaced by transplant       lactobacillus rhamnosus (GG) capsule     60 capsule    Take 1 capsule by mouth 2 times daily    Diarrhea       LANTUS SOLOSTAR 100 UNIT/ML injection   Generic drug:  insulin glargine     15 mL    Inject 18 Units Subcutaneous daily    Type 2 diabetes mellitus with diabetic polyneuropathy, with long-term current use of insulin (H)       levETIRAcetam 1000 MG Tabs     60 tablet    Take 1,000 mg by mouth 2 times daily    Encephalopathy acute, Status epilepticus (H)       levothyroxine 50 MCG tablet    SYNTHROID/LEVOTHROID    90 tablet    TAKE ONE TABLET BY MOUTH EVERY DAY    Other specified hypothyroidism       lidocaine 2 % topical gel    XYLOCAINE    30 mL    Apply topically as needed  for moderate pain 10 ML every 3 weeks    Johnson catheter in place       loratadine 10 MG tablet    CLARITIN    90 tablet    Take 1 tablet (10 mg) by mouth daily    Acute nasopharyngitis       NovoLOG FLEXPEN 100 UNIT/ML injection   Generic drug:  insulin aspart     15 mL    Inject 2-8u sliding scale with meals and 1-4u sliding scale at bedtime    Type 2 diabetes mellitus with diabetic polyneuropathy, with long-term current use of insulin (H)       order for DME     3 catheter    Equipment being ordered: Johnson Catheters - 18 french    Johnson catheter status       * order for DME     100 pad    Equipment being ordered: Blue Chux [facility] send to [facility]    Fecal incontinence       * order for DME     1 each    Equipment being ordered: [employer] FX CPAP interface (nasal pillows), size XS.    Obstructive sleep apnea syndrome       * order for DME     12 each    Equipment being ordered: Silver Impregnated catheters HX of frequent UTI    Recurrent UTI       * order for DME     1 Device    High back guerrero wheel chair MICHELLE 99 DX: morbid obesity, generalized weakness, physical deconditioning, chronic vertigo    Morbid obesity, unspecified obesity type (H), Vertigo, Generalized muscle weakness, Physical deconditioning       pravastatin 10 MG tablet    PRAVACHOL    90 tablet    TAKE ONE TABLET BY MOUTH EVERY DAY    Hyperlipidemia LDL goal <100       predniSONE 5 MG tablet    DELTASONE    90 tablet    Take 1 tablet (5 mg) by mouth daily    Kidney replaced by transplant       simethicone 125 MG Chew chewable tablet    MYLICON    120 tablet    Take 1 tablet (125 mg) by mouth as needed for intestinal gas        TACROLIMUS PO      Take 2.5 mg by mouth 2 times daily        VITAMIN D (CHOLECALCIFEROL) PO      Take 4,000 Units by mouth 2 times daily        * Notice:  This list has 4 medication(s) that are the same as other medications prescribed for you. Read the directions carefully, and ask your doctor or other care provider to review  them with you.

## 2018-02-28 ENCOUNTER — TELEPHONE (OUTPATIENT)
Dept: FAMILY MEDICINE | Facility: CLINIC | Age: 73
End: 2018-02-28

## 2018-02-28 NOTE — PATIENT INSTRUCTIONS
Recommendations from today's MTM visit:                                                        1. Please track your blood sugar and insulin doses on paper    Next MTM visit: 1 week    To schedule another MTM appointment, please call the clinic directly or you may call the MTM scheduling line at [phone] or toll-free at [phone].     My Clinical Pharmacist's contact information:                                                      It was a pleasure speaking with you today!  Please feel free to contact me with any questions or concerns you have.      Conchita Toledo, PharmD, BCACP     You may receive a survey about the MTM services you received.  I would appreciate your feedback to help me serve you better in the future. Please fill it out and return it when you can. Your comments will be anonymous.

## 2018-03-05 ENCOUNTER — VIRTUAL VISIT (OUTPATIENT)
Dept: FAMILY MEDICINE | Facility: CLINIC | Age: 73
End: 2018-03-05
Payer: COMMERCIAL

## 2018-03-05 ENCOUNTER — TELEPHONE (OUTPATIENT)
Dept: FAMILY MEDICINE | Facility: CLINIC | Age: 73
End: 2018-03-05

## 2018-03-05 DIAGNOSIS — Z53.9 ERRONEOUS ENCOUNTER--DISREGARD: Primary | ICD-10-CM

## 2018-03-05 PROCEDURE — 99207 ZZC NO BILLABLE SERVICE THIS VISIT: CPT | Performed by: NURSE PRACTITIONER

## 2018-03-05 NOTE — TELEPHONE ENCOUNTER
Reason for Call:   call back    Detailed comments: pt's spouse (Chang) is requesting a call back from [employer], no other info was given.    Phone Number Patient can be reached at: Home number on file [phone] (home)    Best Time: anytime    Can we leave a detailed message on this number? NO    Call taken on 3/5/2018 at 2:33 PM by Rich [place]

## 2018-03-05 NOTE — TELEPHONE ENCOUNTER
Call back to Pt's .      Pt's  wanted to apologize to writer for the conversation that and anger that he had last week when talking to writer.     Writer accepted his apology and verbalized understanding of his frustration.    Taqueria Araujo RN

## 2018-03-06 LAB
ANION GAP SERPL CALCULATED.3IONS-SCNC: 6 MMOL/L (ref 3–14)
BUN SERPL-MCNC: 41 MG/DL (ref 7–30)
CALCIUM SERPL-MCNC: 9.1 MG/DL (ref 8.5–10.1)
CHLORIDE SERPL-SCNC: 101 MMOL/L (ref 94–109)
CO2 SERPL-SCNC: 27 MMOL/L (ref 20–32)
CREAT SERPL-MCNC: 1.34 MG/DL (ref 0.52–1.04)
ERYTHROCYTE [DISTWIDTH] IN BLOOD BY AUTOMATED COUNT: 14.1 % (ref 10–15)
GFR SERPL CREATININE-BSD FRML MDRD: 39 ML/MIN/1.7M2
GLUCOSE SERPL-MCNC: 264 MG/DL (ref 70–99)
HCT VFR BLD AUTO: 35.9 % (ref 35–47)
HGB BLD-MCNC: 11.7 G/DL (ref 11.7–15.7)
MCH RBC QN AUTO: 30.2 PG (ref 26.5–33)
MCHC RBC AUTO-ENTMCNC: 32.6 G/DL (ref 31.5–36.5)
MCV RBC AUTO: 93 FL (ref 78–100)
PLATELET # BLD AUTO: 145 10E9/L (ref 150–450)
POTASSIUM SERPL-SCNC: 4.4 MMOL/L (ref 3.4–5.3)
RBC # BLD AUTO: 3.87 10E12/L (ref 3.8–5.2)
SODIUM SERPL-SCNC: 134 MMOL/L (ref 133–144)
TACROLIMUS BLD-MCNC: 6.4 UG/L (ref 5–15)
TME LAST DOSE: NORMAL H
WBC # BLD AUTO: 8.2 10E9/L (ref 4–11)

## 2018-03-06 PROCEDURE — 85027 COMPLETE CBC AUTOMATED: CPT | Performed by: INTERNAL MEDICINE

## 2018-03-06 PROCEDURE — 80197 ASSAY OF TACROLIMUS: CPT | Performed by: INTERNAL MEDICINE

## 2018-03-06 PROCEDURE — 80048 BASIC METABOLIC PNL TOTAL CA: CPT | Performed by: INTERNAL MEDICINE

## 2018-03-07 ENCOUNTER — ALLIED HEALTH/NURSE VISIT (OUTPATIENT)
Dept: PHARMACY | Facility: CLINIC | Age: 73
End: 2018-03-07
Payer: COMMERCIAL

## 2018-03-07 DIAGNOSIS — R19.7 DIARRHEA, UNSPECIFIED TYPE: ICD-10-CM

## 2018-03-07 DIAGNOSIS — E11.8 TYPE 2 DIABETES MELLITUS WITH COMPLICATION, WITH LONG-TERM CURRENT USE OF INSULIN (H): Primary | ICD-10-CM

## 2018-03-07 DIAGNOSIS — Z79.4 TYPE 2 DIABETES MELLITUS WITH COMPLICATION, WITH LONG-TERM CURRENT USE OF INSULIN (H): Primary | ICD-10-CM

## 2018-03-07 DIAGNOSIS — E11.42 TYPE 2 DIABETES MELLITUS WITH DIABETIC POLYNEUROPATHY, WITH LONG-TERM CURRENT USE OF INSULIN (H): ICD-10-CM

## 2018-03-07 DIAGNOSIS — M54.9 BACK PAIN, UNSPECIFIED BACK LOCATION, UNSPECIFIED BACK PAIN LATERALITY, UNSPECIFIED CHRONICITY: ICD-10-CM

## 2018-03-07 DIAGNOSIS — Z79.4 TYPE 2 DIABETES MELLITUS WITH DIABETIC POLYNEUROPATHY, WITH LONG-TERM CURRENT USE OF INSULIN (H): ICD-10-CM

## 2018-03-07 PROCEDURE — 99607 MTMS BY PHARM ADDL 15 MIN: CPT | Performed by: PHARMACIST

## 2018-03-07 PROCEDURE — 99606 MTMS BY PHARM EST 15 MIN: CPT | Performed by: PHARMACIST

## 2018-03-07 NOTE — MR AVS SNAPSHOT
After Visit Summary   3/7/2018    Gem Jade    MRN: [id number]           Patient Information     Date Of Birth          1945        Visit Information        Provider Department      3/7/2018 3:00 PM Conchita Toledo, [facility] MTM        Today's Diagnoses     Type 2 diabetes mellitus with complication, with long-term current use of insulin (H)    -  1    Diarrhea, unspecified type        Back pain, unspecified back location, unspecified back pain laterality, unspecified chronicity        Type 2 diabetes mellitus with diabetic polyneuropathy, with long-term current use of insulin (H)          Care Instructions    Recommendations from today's MTM visit:                                                        1. Increase Lantus to 22 units daily    2. Test BS at bedtime consistently    Next MTM visit: 2 weeks by phone    To schedule another MTM appointment, please call the clinic directly or you may call the MTM scheduling line at [phone] or toll-free at [phone].     My Clinical Pharmacist's contact information:                                                      It was a pleasure speaking with you today!  Please feel free to contact me with any questions or concerns you have.      Conchita Toledo, PharmD, BCACP     You may receive a survey about the MTM services you received.  I would appreciate your feedback to help me serve you better in the future. Please fill it out and return it when you can. Your comments will be anonymous.                    Follow-ups after your visit        Your next 10 appointments already scheduled     Mar 21, 2018  3:30 PM CDT   TELEMEDICINE with Conchita Toledo [facility] MTM ([facility])    [address] [place] [id number] [phone]           Note: this is not an onsite visit; there is no need to come to  the facility.              Who to contact     If you have questions or need follow up information about today's clinic visit or your schedule please contact [facility] PRIMARY CARE MT directly at [phone].  Normal or non-critical lab and imaging results will be communicated to you by MyChart, letter or phone within 4 business days after the clinic has received the results. If you do not hear from us within 7 days, please contact the clinic through MyChart or phone. If you have a critical or abnormal lab result, we will notify you by phone as soon as possible.  Submit refill requests through [employer] or call your pharmacy and they will forward the refill request to us. Please allow 3 business days for your refill to be completed.          Additional Information About Your Visit        SpeechVivehart Information     [employer] gives you secure access to your electronic health record. If you see a primary care provider, you can also send messages to your care team and make appointments. If you have questions, please call your primary care clinic.  If you do not have a primary care provider, please call [phone] and they will assist you.        Care EveryWhere ID     This is your Care EveryWhere ID. This could be used by other organizations to access your [place] medical records  [id number]         Blood Pressure from Last 3 Encounters:   01/25/18 137/63   01/15/18 156/70   12/25/17 134/51    Weight from Last 3 Encounters:   01/25/18 233 lb 7.5 oz (105.9 kg)   01/14/18 226 lb 14.4 oz (102.9 kg)   12/23/17 238 lb (108 kg)              Today, you had the following     No orders found for display         Today's Medication Changes          These changes are accurate as of 3/7/18 11:59 PM.  If you have any questions, ask your nurse or doctor.               These medicines have changed or have updated prescriptions.        Dose/Directions    LANTUS SOLOSTAR 100 UNIT/ML injection   This may have changed:   how much to take   Used for:  Type 2 diabetes mellitus with diabetic polyneuropathy, with long-term current use of insulin (H)   Generic drug:  insulin glargine        Dose:  22 Units   Inject 22 Units Subcutaneous daily   Quantity:  15 mL   Refills:  11                Primary Care Provider Office Phone # Fax ANTHONY [facility] [phone]       [address] [facility] [address]  [facility] [address]        Equal Access to Services     CARLOS ENRIQUE GREEN : Hadii aad ku hadasho Soomaali, waaxda luqadaha, qaybta kaalmada adeegyada, waxay idiin hayaan adeeg kharash la'aan . So [facility] [phone].    ATENCIÓN: Si habla español, tiene a peguero disposición servicios gratuitos de asistencia lingüística. [facility] [phone].    We comply with applicable federal civil rights laws and [place] laws. We do not discriminate on the basis of race, color, national origin, age, disability, sex, sexual orientation, or gender identity.            Thank you!     Thank you for choosing [facility] PRIMARY CARE MT  for your care. Our goal is always to provide you with excellent care. Hearing back from our patients is one way we can continue to improve our services. Please take a few minutes to complete the written survey that you may receive in the mail after your visit with us. Thank you!             Your Updated Medication List - Protect others around you: Learn how to safely use, store and throw away your medicines at www.disposemymeds.org.          This list is accurate as of 3/7/18 11:59 PM.  Always use your most recent med list.                   Brand Name Dispense Instructions for use Diagnosis    ACCU-CHEK COMPACT PLUS test strip   Generic drug:  blood glucose monitoring     100 strip    Use to test blood sugar 3 times daily or as directed.  Ok to substitute alternative if insurance prefers.        ACETAMINOPHEN PO      Take 325-650 mg by mouth every 4 hours as needed.        aspirin 81 MG  chewable tablet     90 tablet    Take 1 tablet (81 mg) by mouth daily Starting 1 week after your kidney biopsy    Pulmonary embolism and infarction (H), Diabetes mellitus with background retinopathy (H)       baclofen 10 MG tablet    LIORESAL    90 tablet    Take 1 tablet (10 mg) by mouth 3 times daily as needed for muscle spasms        cloNIDine 0.1 MG tablet    CATAPRES    180 tablet    TAKE ONE TABLET BY MOUTH TWICE A DAY    HTN, goal below 140/90       Cranberry 400 MG Tabs      Take 400 mg by mouth 2 times daily        cyanocobalamin 1000 MCG/ML injection    VITAMIN B12    3 mL    Inject 1 mL (1,000 mcg) into the muscle every 30 days    Iron deficiency anemia, unspecified iron deficiency anemia type       diphenoxylate-atropine 2.5-0.025 MG per tablet    LOMOTIL    30 tablet    Take 1 tablet by mouth 4 times daily as needed for diarrhea    Diarrhea of presumed infectious origin       famotidine 20 MG tablet    PEPCID    60 tablet    Take 1 tablet (20 mg) by mouth 2 times daily        gabapentin 300 MG capsule    NEURONTIN    360 capsule    Take 2 capsules (600 mg) by mouth 2 times daily For pain    Diabetic polyneuropathy associated with type 2 diabetes mellitus (H)       hydrocortisone 1%/eucerin 1% compounded cream     30 g    Apply topically 2 times daily    Rash and nonspecific skin eruption       insulin pen needle 30G X 8 MM     100 each    Use as directed with Андрей    Type 2 diabetes mellitus with diabetic nephropathy (H)       labetalol 300 MG tablet    NORMODYNE    120 tablet    Take 150 mg by mouth 2 times daily Take 1/2 tablet (150mg) =300mg. Hold for systolic BP less than 120.    Benign essential hypertension, Kidney replaced by transplant       lactobacillus rhamnosus (GG) capsule     60 capsule    Take 1 capsule by mouth 2 times daily    Diarrhea       LANTUS SOLOSTAR 100 UNIT/ML injection   Generic drug:  insulin glargine     15 mL    Inject 22 Units Subcutaneous daily    Type 2 diabetes  mellitus with diabetic polyneuropathy, with long-term current use of insulin (H)       levETIRAcetam 1000 MG Tabs     60 tablet    Take 1,000 mg by mouth 2 times daily    Encephalopathy acute, Status epilepticus (H)       levothyroxine 50 MCG tablet    SYNTHROID/LEVOTHROID    90 tablet    TAKE ONE TABLET BY MOUTH EVERY DAY    Other specified hypothyroidism       lidocaine 2 % topical gel    XYLOCAINE    30 mL    Apply topically as needed for moderate pain 10 ML every 3 weeks    Johnson catheter in place       loratadine 10 MG tablet    CLARITIN    90 tablet    Take 1 tablet (10 mg) by mouth daily    Acute nasopharyngitis       NovoLOG FLEXPEN 100 UNIT/ML injection   Generic drug:  insulin aspart     15 mL    Inject 2-8u sliding scale with meals and 1-4u sliding scale at bedtime    Type 2 diabetes mellitus with diabetic polyneuropathy, with long-term current use of insulin (H)       order for DME     3 catheter    Equipment being ordered: Johnson Catheters - 18 french    Johnson catheter status       * order for DME     100 pad    Equipment being ordered: Blue Chux Please send to [facility]    Fecal incontinence       * order for DME     1 each    Equipment being ordered: [employer] FX CPAP interface (nasal pillows), size XS.    Obstructive sleep apnea syndrome       * order for DME     12 each    Equipment being ordered: Silver Impregnated catheters HX of frequent UTI    Recurrent UTI       * order for DME     1 Device    High back guerrero wheel chair MICHELLE 99 DX: morbid obesity, generalized weakness, physical deconditioning, chronic vertigo    Morbid obesity, unspecified obesity type (H), Vertigo, Generalized muscle weakness, Physical deconditioning       pravastatin 10 MG tablet    PRAVACHOL    90 tablet    TAKE ONE TABLET BY MOUTH EVERY DAY    Hyperlipidemia LDL goal <100       predniSONE 5 MG tablet    DELTASONE    90 tablet    Take 1 tablet (5 mg) by mouth daily    Kidney replaced by transplant       simethicone 125 MG  Chew chewable tablet    MYLICON    120 tablet    Take 1 tablet (125 mg) by mouth as needed for intestinal gas        TACROLIMUS PO      Take 2.5 mg by mouth 2 times daily        VITAMIN D (CHOLECALCIFEROL) PO      Take 4,000 Units by mouth 2 times daily        * Notice:  This list has 4 medication(s) that are the same as other medications prescribed for you. Read the directions carefully, and ask your doctor or other care provider to review them with you.

## 2018-03-07 NOTE — PROGRESS NOTES
SUBJECTIVE/OBJECTIVE:                Gem Jade is a 72 year old female called for a follow-up visit for Medication Therapy Management.  She was referred to me from Marivel Barcenas.  Keshawn also on the phone call.     Chief Complaint: Follow up from our visit on 2/27/18.  Back pain  Tobacco: History of tobacco dependence - quit  Alcohol: not currently using    Medication Adherence/Access:  Patient has assistance with medication administration and setting up boxes (HCRN).    Back pain:  Injury while reaching something from her bed 2 days ago. Hasn't tried anything for pain, no ice or heat. Doesn't want to take apap, hurts her stomach and ineffective. Hasn't tried anything topical. Rest is helpful and minimal pain at rest.   Restarted OT twice weekly x4 weeks, working on wheelchair transport. Also PT weekly as well and starts this week.    Diabetes: Currently taking lantus 18u subQ daily and novolog 2-8u with meals per sliding scale  SMBG: zero to two times daily.  Ranges (patient reported):   Date FBG/ 2hours post Lunch/2hours post Dinner /2hours post Bedtime   3/7       3/6 379 (6u)       297   311 (4u)     283 (4u)  491 (8u) 547 (8u)    244 (4u)  290 (4u)     290 (4u)  336 (4u)             Patient is not experiencing hypoglycemia  Recent symptoms of high blood sugar? none  Eye exam: due  Foot exam: due  Microalbumin is not < 30 mg/g. Pt is not taking an ACEi/ARB.  Aspirin: Taking 81mg daily and denies side effects  Diet/Exercise: appetite variable, eating mostly fish with potatoes, sometimes waffles. Eating 1-2x a day. Regular popsicle at night.    Diarrhea: Currently taking lomotil 2.5-0.025 mg tablets PRN up to 4 times daily. Had needed to take TID x4 days last week, this week has not needed any lomotil.  Pt's  wondering why he can't  #120 tablets of Lomotil per month which would be a 30 day supply as instructions are written. States he never gives pt that many tablets per month  "but Rx is much less expensive usually and wants more than \"a 7 day supply\".  Expresses concerned that they are being accused of abusing the medication.   Per [facility]--Lomotil dispense dates in the last 12 months  2/27/18 #30  12/27/17 #30  11/19/17 #30  11/09/17 #30  [no other Lomotil Rx dispensed from 3/2017-11/9/17]    Current labs include:  Today's Vitals: There were no vitals taken for this visit. - phone call  BP Readings from Last 3 Encounters:   01/25/18 137/63   01/15/18 156/70   12/25/17 134/51     Lab Results   Component Value Date    A1C 6.3 01/12/2018   .  Lab Results   Component Value Date    CHOL 164 12/05/2016     Lab Results   Component Value Date    TRIG 272 12/05/2016     Lab Results   Component Value Date    HDL 46 12/05/2016     Lab Results   Component Value Date    LDL 64 12/05/2016       Liver Function Studies -   Recent Labs   Lab Test  01/20/18   0720   PROTTOTAL  7.1   ALBUMIN  2.6*   BILITOTAL  0.4   ALKPHOS  106   AST  22   ALT  20       Lab Results   Component Value Date    UCRR 43 11/16/2017    MICROL 1850 04/26/2017    UMALCR 6776.56 (H) 04/26/2017       Last Basic Metabolic Panel:  Lab Results   Component Value Date     03/06/2018      Lab Results   Component Value Date    POTASSIUM 4.4 03/06/2018     Lab Results   Component Value Date    CHLORIDE 101 03/06/2018     Lab Results   Component Value Date    BUN 41 03/06/2018     Lab Results   Component Value Date    CR 1.34 03/06/2018     GFR Estimate   Date Value Ref Range Status   03/06/2018 39 (L) >60 mL/min/1.7m2 Final     Comment:     Non  GFR Calc   02/13/2018 34 (L) >60 mL/min/1.7m2 Final     Comment:     Non  GFR Calc   02/06/2018 36 (L) >60 mL/min/1.7m2 Final     Comment:     Non  GFR Calc     GFR Estimate If Black   Date Value Ref Range Status   03/06/2018 47 (L) >60 mL/min/1.7m2 Final     Comment:      GFR Calc   02/13/2018 41 (L) >60 mL/min/1.7m2 Final     " "Comment:      GFR Calc   02/06/2018 44 (L) >60 mL/min/1.7m2 Final     Comment:      GFR Calc     TSH   Date Value Ref Range Status   01/19/2018 1.31 0.40 - 4.00 mU/L Final   ]    Most Recent Immunizations   Administered Date(s) Administered     HepB 01/13/2003     Influenza (H1N1) 11/25/2009     Influenza (High Dose) 3 valent vaccine 10/07/2016     Influenza (IIV3) PF 11/05/2011     Influenza Vaccine IM 3yrs+ 4 Valent IIV4 10/16/2013     Pneumo Conj 13-V (2010&after) 12/01/2015     Pneumococcal 23 valent 06/17/2013     TDAP Vaccine (Adacel) 06/17/2013     Zoster vaccine, live 06/17/2013       ASSESSMENT:              Current medications were reviewed today as discussed above.     Medication Adherence: good, no issues identified    Back pain: needs improvement. Encouraged pt to trial topical medication for back if needed with increased activity during PT. Pain is controlled with rest presently.     Diabetes: needs improvement. A1c at goal <8%, however BS have continued to be elevated since last hospitalization. Pt would benefit from increasing Lantus and checking BS at bedtime to allow for more accurate evaluation of dinner dose of Novolog.     diarrhea: stable. [place]ed [place] , pt has had variable use of Lomotil from 30 tabs lasting 2 weeks to 2 months. Seemingly unnecessary to rewrite this last Rx for more than #30 per month but could consider compromise for #60 since pt had previous one month span of needing higher frequency of medication. Suggest PCP use additional wording on the next Rx \"start Lomotil with 3rd consecutive loose stool\".  May also need to add max number of tablets per week or month on the prescription instructions.     PLAN:                  Increase Lantus to 22u daily  Test BS at bedtime every day    I spent 30 minutes with this patient today. All changes were made via collaborative practice agreement with Marivel Barcenas. A copy of the visit note was " provided to the patient's primary care provider.     Will follow up in 2 weeks.    The patient declined a summary of these recommendations as an after visit summary.    Conchita Toledo, PharmD, BCACP

## 2018-03-08 ENCOUNTER — TELEPHONE (OUTPATIENT)
Dept: FAMILY MEDICINE | Facility: CLINIC | Age: 73
End: 2018-03-08

## 2018-03-08 NOTE — PATIENT INSTRUCTIONS
Recommendations from today's MTM visit:                                                        1. Increase Lantus to 22 units daily    2. Test BS at bedtime consistently    Next MTM visit: 2 weeks by phone    To schedule another MTM appointment, please call the clinic directly or you may call the MTM scheduling line at [phone] or toll-free at [phone].     My Clinical Pharmacist's contact information:                                                      It was a pleasure speaking with you today!  Please feel free to contact me with any questions or concerns you have.      Conchita Toledo, PharmD, BCACP     You may receive a survey about the MTM services you received.  I would appreciate your feedback to help me serve you better in the future. Please fill it out and return it when you can. Your comments will be anonymous.

## 2018-03-08 NOTE — TELEPHONE ENCOUNTER
Reason for Call:  Home Health Care    Korina with [facility] called regarding (reason for call): orders    Orders are needed for this patient.     PT: 1w4    Pt Provider: Klaudia Barcenas    Phone Number Homecare Nurse can be reached at: [phone]    Can we leave a detailed message on this number? YES    Best Time: anytime    Call taken on 3/8/2018 at 3:34 PM by Margaret Hoover

## 2018-03-09 ENCOUNTER — VIRTUAL VISIT (OUTPATIENT)
Dept: FAMILY MEDICINE | Facility: CLINIC | Age: 73
End: 2018-03-09
Payer: COMMERCIAL

## 2018-03-09 DIAGNOSIS — E11.42 DIABETIC POLYNEUROPATHY ASSOCIATED WITH TYPE 2 DIABETES MELLITUS (H): Primary | ICD-10-CM

## 2018-03-09 DIAGNOSIS — R19.7 DIARRHEA, UNSPECIFIED TYPE: ICD-10-CM

## 2018-03-09 DIAGNOSIS — M54.9 ACUTE BACK PAIN, UNSPECIFIED BACK LOCATION, UNSPECIFIED BACK PAIN LATERALITY: ICD-10-CM

## 2018-03-09 PROCEDURE — 98968 PH1 ASSMT&MGMT NQHP 21-30: CPT | Performed by: NURSE PRACTITIONER

## 2018-03-09 RX ORDER — DIPHENOXYLATE HCL/ATROPINE 2.5-.025MG
TABLET ORAL
Qty: 60 TABLET | Refills: 3 | Status: ON HOLD | OUTPATIENT
Start: 2018-03-09 | End: 2018-05-25

## 2018-03-09 NOTE — MR AVS SNAPSHOT
After Visit Summary   3/9/2018    Gem Jade    MRN: [id number]           Patient Information     Date Of Birth          1945        Visit Information        Provider Department      3/9/2018 3:30 PM Marivel Barcenas APRN CNP [facility]        Today's Diagnoses     Diabetic polyneuropathy associated with type 2 diabetes mellitus (H)    -  1    Acute back pain, unspecified back location, unspecified back pain laterality        Diarrhea, unspecified type          Care Instructions    I will reorder your lomotil today and give you 60 tabs, please use as directed    Please try and eat small freq meals    Please work on participating in PT, try something for your pain 30 minutes before, topical or acetaminophen     I will call you in one month            Follow-ups after your visit        Follow-up notes from your care team     Return in about 6 weeks (around 4/20/2018).      Your next 10 appointments already scheduled     Mar 21, 2018  3:30 PM CDT   TELEMEDICINE with Conchita Toledo [facility] ([facility])    [address] [place] [id number] [phone]           Note: this is not an onsite visit; there is no need to come to the facility.              Who to contact     If you have questions or need follow up information about today's clinic visit or your schedule please contact [facility] directly at [phone].  Normal or non-critical lab and imaging results will be communicated to you by MyChart, letter or phone within 4 business days after the clinic has received the results. If you do not hear from us within 7 days, please contact the clinic through MyChart or phone. If you have a critical or abnormal lab result, we will notify you by phone as soon as possible.  Submit refill requests through [employer] or call your  pharmacy and they will forward the refill request to us. Please allow 3 business days for your refill to be completed.          Additional Information About Your Visit        [employer] Information     [employer] gives you secure access to your electronic health record. If you see a primary care provider, you can also send messages to your care team and make appointments. If you have questions, please call your primary care clinic.  If you do not have a primary care provider, please call [phone] and they will assist you.        Care EveryWhere ID     This is your Care EveryWhere ID. This could be used by other organizations to access your [place] medical records  [id number]         Blood Pressure from Last 3 Encounters:   01/25/18 137/63   01/15/18 156/70   12/25/17 134/51    Weight from Last 3 Encounters:   01/25/18 233 lb 7.5 oz (105.9 kg)   01/14/18 226 lb 14.4 oz (102.9 kg)   12/23/17 238 lb (108 kg)              Today, you had the following     No orders found for display         Today's Medication Changes          These changes are accurate as of 3/9/18 11:59 PM.  If you have any questions, ask your nurse or doctor.               These medicines have changed or have updated prescriptions.        Dose/Directions    diphenoxylate-atropine 2.5-0.025 MG per tablet   Commonly known as:  LOMOTIL   This may have changed:    - how much to take  - how to take this  - when to take this  - reasons to take this  - additional instructions        After the third watery stool start Lomotil every 4 hours until cleared   Quantity:  60 tablet   Refills:  3            Where to get your medicines      Some of these will need a paper prescription and others can be bought over the counter.  Ask your nurse if you have questions.     Bring a paper prescription for each of these medications     diphenoxylate-atropine 2.5-0.025 MG per tablet                Primary Care Provider Office Phone # Fax #    ANTHONY Antonio  [phone]       [address]  [facility] [address]        Equal Access to Services     CARLOS ENRIQUE GREEN : Hadii jairo peter tere Goodman, wabettyda lujose, jeanineta kanavidda tony, jessica roman. So [facility] [phone].    ATENCIÓN: Si habla español, tiene a peguero disposición servicios gratuitos de asistencia lingüística. Llame al [phone].    We comply with applicable federal civil rights laws and [place] laws. We do not discriminate on the basis of race, color, national origin, age, disability, sex, sexual orientation, or gender identity.            Thank you!     Thank you for choosing [facility]  for your care. Our goal is always to provide you with excellent care. Hearing back from our patients is one way we can continue to improve our services. Please take a few minutes to complete the written survey that you may receive in the mail after your visit with us. Thank you!             Your Updated Medication List - Protect others around you: Learn how to safely use, store and throw away your medicines at www.disposemymeds.org.          This list is accurate as of 3/9/18 11:59 PM.  Always use your most recent med list.                   Brand Name Dispense Instructions for use Diagnosis    ACCU-CHEK COMPACT PLUS test strip   Generic drug:  blood glucose monitoring     100 strip    Use to test blood sugar 3 times daily or as directed.  Ok to substitute alternative if insurance prefers.        ACETAMINOPHEN PO      Take 325-650 mg by mouth every 4 hours as needed.        aspirin 81 MG chewable tablet     90 tablet    Take 1 tablet (81 mg) by mouth daily Starting 1 week after your kidney biopsy    Pulmonary embolism and infarction (H), Diabetes mellitus with background retinopathy (H)       baclofen 10 MG tablet    LIORESAL    90 tablet    Take 1 tablet (10 mg) by mouth 3 times daily as needed for muscle spasms        cloNIDine 0.1 MG  tablet    CATAPRES    180 tablet    TAKE ONE TABLET BY MOUTH TWICE A DAY    HTN, goal below 140/90       Cranberry 400 MG Tabs      Take 400 mg by mouth 2 times daily        cyanocobalamin 1000 MCG/ML injection    VITAMIN B12    3 mL    Inject 1 mL (1,000 mcg) into the muscle every 30 days    Iron deficiency anemia, unspecified iron deficiency anemia type       diphenoxylate-atropine 2.5-0.025 MG per tablet    LOMOTIL    60 tablet    After the third watery stool start Lomotil every 4 hours until cleared        famotidine 20 MG tablet    PEPCID    60 tablet    Take 1 tablet (20 mg) by mouth 2 times daily        gabapentin 300 MG capsule    NEURONTIN    360 capsule    Take 2 capsules (600 mg) by mouth 2 times daily For pain    Diabetic polyneuropathy associated with type 2 diabetes mellitus (H)       hydrocortisone 1%/eucerin 1% compounded cream     30 g    Apply topically 2 times daily    Rash and nonspecific skin eruption       insulin pen needle 30G X 8 MM     100 each    Use as directed with Lantus    Type 2 diabetes mellitus with diabetic nephropathy (H)       labetalol 300 MG tablet    NORMODYNE    120 tablet    Take 150 mg by mouth 2 times daily Take 1/2 tablet (150mg) =300mg. Hold for systolic BP less than 120.    Benign essential hypertension, Kidney replaced by transplant       lactobacillus rhamnosus (GG) capsule     60 capsule    Take 1 capsule by mouth 2 times daily    Diarrhea       LANTUS SOLOSTAR 100 UNIT/ML injection   Generic drug:  insulin glargine     15 mL    Inject 22 Units Subcutaneous daily    Type 2 diabetes mellitus with diabetic polyneuropathy, with long-term current use of insulin (H)       levETIRAcetam 1000 MG Tabs     60 tablet    Take 1,000 mg by mouth 2 times daily    Encephalopathy acute, Status epilepticus (H)       levothyroxine 50 MCG tablet    SYNTHROID/LEVOTHROID    90 tablet    TAKE ONE TABLET BY MOUTH EVERY DAY    Other specified hypothyroidism       lidocaine 2 % topical gel     XYLOCAINE    30 mL    Apply topically as needed for moderate pain 10 ML every 3 weeks    Johnson catheter in place       loratadine 10 MG tablet    CLARITIN    90 tablet    Take 1 tablet (10 mg) by mouth daily    Acute nasopharyngitis       NovoLOG FLEXPEN 100 UNIT/ML injection   Generic drug:  insulin aspart     15 mL    Inject 2-8u sliding scale with meals and 1-4u sliding scale at bedtime    Type 2 diabetes mellitus with diabetic polyneuropathy, with long-term current use of insulin (H)       order for DME     3 catheter    Equipment being ordered: Johnson Catheters - 18 french    Johnson catheter status       * order for DME     100 pad    Equipment being ordered: Blue Chux Please send to [facility]    Fecal incontinence       * order for DME     1 each    Equipment being ordered: zeeWAVES FX CPAP interface (nasal pillows), size XS.    Obstructive sleep apnea syndrome       * order for DME     12 each    Equipment being ordered: Silver Impregnated catheters HX of frequent UTI    Recurrent UTI       * order for DME     1 Device    High back guerrero wheel chair MICHELLE 99 DX: morbid obesity, generalized weakness, physical deconditioning, chronic vertigo    Morbid obesity, unspecified obesity type (H), Vertigo, Generalized muscle weakness, Physical deconditioning       pravastatin 10 MG tablet    PRAVACHOL    90 tablet    TAKE ONE TABLET BY MOUTH EVERY DAY    Hyperlipidemia LDL goal <100       predniSONE 5 MG tablet    DELTASONE    90 tablet    Take 1 tablet (5 mg) by mouth daily    Kidney replaced by transplant       simethicone 125 MG Chew chewable tablet    MYLICON    120 tablet    Take 1 tablet (125 mg) by mouth as needed for intestinal gas        TACROLIMUS PO      Take 2.5 mg by mouth 2 times daily        VITAMIN D (CHOLECALCIFEROL) PO      Take 4,000 Units by mouth 2 times daily        * Notice:  This list has 4 medication(s) that are the same as other medications prescribed for you. Read the directions carefully,  and ask your doctor or other care provider to review them with you.

## 2018-03-09 NOTE — PROGRESS NOTES
"**8Pt would like to discuss Joseph.       Gem Jade is a 72 year old female who is being evaluated via a billable telephone visit.      The patient has been notified of following:     \"This telephone visit will be conducted via a call between you and your physician/provider. We have found that certain health care needs can be provided without the need for a physical exam.  This service lets us provide the care you need with a short phone conversation.  If a prescription is necessary we can send it directly to your pharmacy.  If lab work is needed we can place an order for that and you can then stop by our lab to have the test done at a later time.    We will bill your insurance company for this service.  Please check with your medical insurance if this type of visit is covered. You may be responsible for the cost of this type of visit if insurance coverage is denied.  The typical cost is $30 (10min), $59 (11-20min) and $85 (21-30min).  Most often these visits are shorter than 10 minutes.    If during the course of the call the physician/provider feels a telephone visit is not appropriate, you will not be charged for this service.\"       Consent has been obtained for this service by care team member: yes. See the scanned image in the medical record.  SUBJECTIVE:     Gem Jade complains of  No chief complaint on file.      I have reviewed and updated the patient's Past Medical History, Social History, Family History and Medication List.    ALLERGIES  Bactrim ds [sulfamethoxazole w/trimethoprim]; Atorvastatin calcium; Codeine; Darvocet [propoxyphene n-apap]; Hydromorphone; Levofloxacin; Morphine; Niaspan [niacin]; Percocet [oxycodone-acetaminophen]; and Vicodin [hydrocodone-acetaminophen]    Thalia Cazares MA    Additional provider notes:     Back pain:  Injury while reaching something from her bed, Hasn't tried anything for pain, no ice or heat. Doesn't want to take apap, hurts her stomach and " "ineffective. Hasn't tried anything topical. Rest is helpful and minimal pain at rest.   Restarted OT twice weekly x4 weeks, working on wheelchair transport. Also PT weekly as well and starts this week. Feels like it is improving     Diabetes: Currently taking lantus 22u subQ daily, just increased and novolog 2-8u with meals per sliding scale  SMBG: zero to two times daily.  Ranges (patient reported): feel it has improved with increase in Lantus and usually giving Novolog  4-6u during the day and 8u at bedtime  Fasting - 214  Before bedtime- 212     Patient is not experiencing hypoglycemia  Recent symptoms of high blood sugar? none  Eye exam: due  Foot exam: due  Microalbumin is not < 30 mg/g. Pt is not taking an ACEi/ARB.  Aspirin: Taking 81mg daily and denies side effects  Diet/Exercise: appetite variable, eating mostly fish with potatoes, sometimes waffles. Eating 1-2x a day. Still afraid to eat because numbers are so high     Diarrhea: Currently taking lomotil 2.5-0.025 mg tablets PRN up to 4 times daily. Had needed to take TID x4 days last week, this week has not needed any lomotil.  Pt's  wondering why he can't  #120 tablets of Lomotil per month which would be a 30 day supply as instructions are written. States he never gives pt that many tablets per month but Rx is much less expensive usually and wants more than \"a 7 day supply\".  Expresses concerned that they are being accused of abusing the medication.   Per [facility]--Lomotil dispense dates in the last 12 months, discussed changing the rx to state to start after the 3rd consecutive watery stools then every 4 hours until cleared, increase amount to 60 tabs, patient agrees  2/27/18 #30  12/27/17 #30  11/19/17 #30  11/09/17 #30  [no other Lomotil Rx dispensed from 3/2017-11/9/17]    OBJECTIVE:     (E11.42) Diabetic polyneuropathy associated with type 2 diabetes mellitus (H)  (primary encounter diagnosis)  Comment: recent increase in insulin due to " high RBG levels,   Plan: lantus increased to 22u daily and novolog 4-6u during the day and 8u at bedtime     (M54.9) Acute back pain, unspecified back location, unspecified back pain laterality  Comment: improving with rest  Plan: discussed using a topical before PT so she can participate in the therapy     (R19.7) Diarrhea, unspecified type  Comment: improving  Plan: discussed how to use lomotil, will reorder today for 60 tabs    Patient Instructions   I will reorder your lomotil today and give you 60 tabs, please use as directed    Please try and eat small freq meals    Please work on participating in PT, try something for your pain 30 minutes before, topical or acetaminophen     I will call you in one month        I have reviewed the note as documented above.  This accurately captures the substance of my conversation with the patient,  Klaudia MTZ      Total time of call between patient and provider was 30 minutes     Thalia Cazares

## 2018-03-13 NOTE — PATIENT INSTRUCTIONS
I will reorder your lomotil today and give you 60 tabs, please use as directed    Please try and eat small freq meals    Please work on participating in PT, try something for your pain 30 minutes before, topical or acetaminophen     I will call you in one month

## 2018-03-14 PROCEDURE — G0179 MD RECERTIFICATION HHA PT: HCPCS

## 2018-03-16 DIAGNOSIS — G40.901 STATUS EPILEPTICUS (H): ICD-10-CM

## 2018-03-16 DIAGNOSIS — G93.40 ENCEPHALOPATHY ACUTE: ICD-10-CM

## 2018-03-19 ENCOUNTER — TELEPHONE (OUTPATIENT)
Dept: FAMILY MEDICINE | Facility: CLINIC | Age: 73
End: 2018-03-19

## 2018-03-19 RX ORDER — LEVETIRACETAM 1000 MG/1
1000 TABLET ORAL 2 TIMES DAILY
Qty: 60 TABLET | Refills: 1 | Status: ON HOLD | OUTPATIENT
Start: 2018-03-19 | End: 2018-05-24

## 2018-03-19 NOTE — TELEPHONE ENCOUNTER
Orders signed and faxed to [facility] at [phone].    Cora Tran MA

## 2018-03-21 ENCOUNTER — ALLIED HEALTH/NURSE VISIT (OUTPATIENT)
Dept: PHARMACY | Facility: CLINIC | Age: 73
End: 2018-03-21
Payer: COMMERCIAL

## 2018-03-21 DIAGNOSIS — E11.8 TYPE 2 DIABETES MELLITUS WITH COMPLICATION, WITH LONG-TERM CURRENT USE OF INSULIN (H): Primary | ICD-10-CM

## 2018-03-21 DIAGNOSIS — Z79.4 TYPE 2 DIABETES MELLITUS WITH COMPLICATION, WITH LONG-TERM CURRENT USE OF INSULIN (H): Primary | ICD-10-CM

## 2018-03-21 DIAGNOSIS — I10 BENIGN ESSENTIAL HYPERTENSION: ICD-10-CM

## 2018-03-21 DIAGNOSIS — M54.9 BACK PAIN, UNSPECIFIED BACK LOCATION, UNSPECIFIED BACK PAIN LATERALITY, UNSPECIFIED CHRONICITY: ICD-10-CM

## 2018-03-21 PROCEDURE — 99607 MTMS BY PHARM ADDL 15 MIN: CPT | Performed by: PHARMACIST

## 2018-03-21 PROCEDURE — 99606 MTMS BY PHARM EST 15 MIN: CPT | Performed by: PHARMACIST

## 2018-03-21 NOTE — PROGRESS NOTES
SUBJECTIVE/OBJECTIVE:                Gem Jade is a 72 year old female called for a follow-up visit for Medication Therapy Management.  She was referred to me from Marivel Barcenas. Spoke with pt's  Keshawn.     Chief Complaint: Follow up from our visit on 3/7/18.  hyperglycemia  Tobacco: History of tobacco dependence - quit  Alcohol: not currently using    Medication Adherence/Access:  Patient has assistance with medication administration and setting up boxes (HCRN).    Back pain:  Injury a few weeks ago while reaching something from her bed. Hasn't tried anything for pain, no ice or heat. Doesn't want to take apap, hurts her stomach and ineffective. Hasn't tried anything topical. Rest is helpful and minimal pain at rest. Pain has continued to improve. OT/PT on hold until back improves. Has not yet had X-ray but has been ordered.  Pt continues to do breathing exercises from PT and has found improvement in strength.     Diabetes: Currently taking lantus 22u subQ daily and novolog 2-8u with meals per sliding scale. Having difficulty remembering to test BS and use sliding scale.   SMBG: zero to two times daily.  Ranges (patient reported):   Date FBG/ 2hours post Lunch/2hours post Dinner /2hours post Bedtime   3/21 345      3/20       3/19   305 284    186   440    273   436      136 338    353   324     Patient is not experiencing hypoglycemia  Recent symptoms of high blood sugar? none  Eye exam: due  Foot exam: due  Microalbumin is not < 30 mg/g. Pt is not taking an ACEi/ARB.  Aspirin: Taking 81mg daily and denies side effects  Diet/Exercise: appetite variable but has improved, eating more carbohydrate    Hypertension: Current medications include labetalol 150mg BID, clonidine 0.1mg BID.  Patient has BP monitored by HCRN - called [facility] and reports home BP monitoring in range of 158/68 (3/19),132/71. Avg 130-140s / 60-70s. Patient reports no current medication side effects.    Current labs  include:  Today's Vitals: There were no vitals taken for this visit. - phone call  BP Readings from Last 3 Encounters:   01/25/18 137/63   01/15/18 156/70   12/25/17 134/51     Lab Results   Component Value Date    A1C 6.3 01/12/2018   .  Lab Results   Component Value Date    CHOL 164 12/05/2016     Lab Results   Component Value Date    TRIG 272 12/05/2016     Lab Results   Component Value Date    HDL 46 12/05/2016     Lab Results   Component Value Date    LDL 64 12/05/2016       Liver Function Studies -   Recent Labs   Lab Test  01/20/18   0720   PROTTOTAL  7.1   ALBUMIN  2.6*   BILITOTAL  0.4   ALKPHOS  106   AST  22   ALT  20       Lab Results   Component Value Date    UCRR 43 11/16/2017    MICROL 1850 04/26/2017    UMALCR 6776.56 (H) 04/26/2017       Last Basic Metabolic Panel:  Lab Results   Component Value Date     03/06/2018      Lab Results   Component Value Date    POTASSIUM 4.4 03/06/2018     Lab Results   Component Value Date    CHLORIDE 101 03/06/2018     Lab Results   Component Value Date    BUN 41 03/06/2018     Lab Results   Component Value Date    CR 1.34 03/06/2018     GFR Estimate   Date Value Ref Range Status   03/06/2018 39 (L) >60 mL/min/1.7m2 Final     Comment:     Non  GFR Calc   02/13/2018 34 (L) >60 mL/min/1.7m2 Final     Comment:     Non  GFR Calc   02/06/2018 36 (L) >60 mL/min/1.7m2 Final     Comment:     Non  GFR Calc     GFR Estimate If Black   Date Value Ref Range Status   03/06/2018 47 (L) >60 mL/min/1.7m2 Final     Comment:      GFR Calc   02/13/2018 41 (L) >60 mL/min/1.7m2 Final     Comment:      GFR Calc   02/06/2018 44 (L) >60 mL/min/1.7m2 Final     Comment:      GFR Calc     TSH   Date Value Ref Range Status   01/19/2018 1.31 0.40 - 4.00 mU/L Final   ]    Most Recent Immunizations   Administered Date(s) Administered     HepB 01/13/2003     Influenza (H1N1) 11/25/2009     Influenza  (High Dose) 3 valent vaccine 10/07/2016     Influenza (IIV3) PF 11/05/2011     Influenza Vaccine IM 3yrs+ 4 Valent IIV4 10/16/2013     Pneumo Conj 13-V (2010&after) 12/01/2015     Pneumococcal 23 valent 06/17/2013     TDAP Vaccine (Adacel) 06/17/2013     Zoster vaccine, live 06/17/2013       ASSESSMENT:              Current medications were reviewed today as discussed above.      Medication Adherence: excellent with pills, fair with Novolog. needs improvement - see below    Back pain: improved. Pt declines additional medication therapy at this time.     Diabetes: needs improvement. A1c at goal <8% however recent BS are elevated. Likely 2/2 missing multiple (>50%) doses Novolog. No improvement over the last few months on use of consistent sliding scale; discussed with Keshawn and would like to go back to using flat 6 units of Novolog with meals. For very elevated BS>400, OK to use up to 10 units. Encouraged ongoing work with adherence.    Hypertension: needs improvement. BP not at goal <140/90 consistently.  HCRN agreeable to ongoing evaluation and will notify the clinic if BP remains borderline controlled.     PLAN:                  D/c Novolog sliding scale. Start Novolog 6 units with meals.     I spent 20 minutes with this patient today. All changes were made via collaborative practice agreement with Marivel Barcenas. A copy of the visit note was provided to the patient's primary care provider.     Will follow up in 2 weeks.    The patient declined a summary of these recommendations as an after visit summary.    Conchita Toledo, PharmD, BCACP

## 2018-03-21 NOTE — MR AVS SNAPSHOT
After Visit Summary   3/21/2018    Gem Jade    MRN: [id number]           Patient Information     Date Of Birth          1945        Visit Information        Provider Department      3/21/2018 3:30 PM Conchita Toledo, [facility] [place]        Today's Diagnoses     Type 2 diabetes mellitus with complication, with long-term current use of insulin (H)    -  1    Back pain, unspecified back location, unspecified back pain laterality, unspecified chronicity        Benign essential hypertension           Follow-ups after your visit        Your next 10 appointments already scheduled     Apr 04, 2018  3:30 PM CDT   TELEMEDICINE with Conchita Toledo [facility] ([facility])    [address] [place] [address]  [address] [place] [id number] [phone]           Note: this is not an onsite visit; there is no need to come to the facility.            Apr 25, 2018  3:30 PM CDT   Telephone Visit with ANTHONY [facility] ([facility])    [address] [place] [address]  [address] [place] [id number] [phone]           Note: this is not an onsite visit; there is no need to come to the facility.              Who to contact     If you have questions or need follow up information about today's clinic visit or your schedule please contact [facility] directly at [phone].  Normal or non-critical lab and imaging results will be communicated to you by MyChart, letter or phone within 4 business days after the clinic has received the results. If you do not hear from us within 7 days, please contact the clinic through MyChart or phone. If you have a critical or abnormal lab result, we will notify you by phone as soon as possible.  Submit refill requests through [employer] or call your  pharmacy and they will forward the refill request to us. Please allow 3 business days for your refill to be completed.          Additional Information About Your Visit        MyChart Information     [employer] gives you secure access to your electronic health record. If you see a primary care provider, you can also send messages to your care team and make appointments. If you have questions, please call your primary care clinic.  If you do not have a primary care provider, please call [phone] and they will assist you.        Care EveryWhere ID     This is your Care EveryWhere ID. This could be used by other organizations to access your [place] medical records  [id number]         Blood Pressure from Last 3 Encounters:   01/25/18 137/63   01/15/18 156/70   12/25/17 134/51    Weight from Last 3 Encounters:   01/25/18 233 lb 7.5 oz (105.9 kg)   01/14/18 226 lb 14.4 oz (102.9 kg)   12/23/17 238 lb (108 kg)              Today, you had the following     No orders found for display       Primary Care Provider Office Phone # Fax #    Marivel Barcenas, APRN [facility] [phone]       [address] [facility] [address]  [facility] [address]        Equal Access to Services     CARLOS ENRIQUE GREEN : Hadii aad ku hadasho Soomaali, waaxda luqadaha, qaybta kaalmada adeegyada, jessica cuadrain haybrittaneyn anni aguilar . So [facility] [phone].    ATENCIÓN: Si habla español, tiene a peguero disposición servicios gratuitos de asistencia lingüística. Llame al [phone].    We comply with applicable federal civil rights laws and [place] laws. We do not discriminate on the basis of race, color, national origin, age, disability, sex, sexual orientation, or gender identity.            Thank you!     Thank you for choosing [facility] [place]  for your care. Our goal is always to provide you with excellent care. Hearing back from our patients is one way we can continue to improve our services. Please take a few  minutes to complete the written survey that you may receive in the mail after your visit with us. Thank you!             Your Updated Medication List - Protect others around you: Learn how to safely use, store and throw away your medicines at www.disposemymeds.org.          This list is accurate as of 3/21/18 11:59 PM.  Always use your most recent med list.                   Brand Name Dispense Instructions for use Diagnosis    ACCU-CHEK COMPACT PLUS test strip   Generic drug:  blood glucose monitoring     100 strip    Use to test blood sugar 3 times daily or as directed.  Ok to substitute alternative if insurance prefers.        ACETAMINOPHEN PO      Take 325-650 mg by mouth every 4 hours as needed.        aspirin 81 MG chewable tablet     90 tablet    Take 1 tablet (81 mg) by mouth daily Starting 1 week after your kidney biopsy    Pulmonary embolism and infarction (H), Diabetes mellitus with background retinopathy (H)       baclofen 10 MG tablet    LIORESAL    90 tablet    Take 1 tablet (10 mg) by mouth 3 times daily as needed for muscle spasms        cloNIDine 0.1 MG tablet    CATAPRES    180 tablet    TAKE ONE TABLET BY MOUTH TWICE A DAY    HTN, goal below 140/90       Cranberry 400 MG Tabs      Take 400 mg by mouth 2 times daily        cyanocobalamin 1000 MCG/ML injection    VITAMIN B12    3 mL    Inject 1 mL (1,000 mcg) into the muscle every 30 days    Iron deficiency anemia, unspecified iron deficiency anemia type       diphenoxylate-atropine 2.5-0.025 MG per tablet    LOMOTIL    60 tablet    After the third watery stool start Lomotil every 4 hours until cleared        famotidine 20 MG tablet    PEPCID    60 tablet    Take 1 tablet (20 mg) by mouth 2 times daily        gabapentin 300 MG capsule    NEURONTIN    360 capsule    Take 2 capsules (600 mg) by mouth 2 times daily For pain    Diabetic polyneuropathy associated with type 2 diabetes mellitus (H)       hydrocortisone 1%/eucerin 1% compounded cream     30  g    Apply topically 2 times daily    Rash and nonspecific skin eruption       insulin pen needle 30G X 8 MM     100 each    Use as directed with Lantus    Type 2 diabetes mellitus with diabetic nephropathy (H)       labetalol 300 MG tablet    NORMODYNE    120 tablet    Take 150 mg by mouth 2 times daily Take 1/2 tablet (150mg) =300mg. Hold for systolic BP less than 120.    Benign essential hypertension, Kidney replaced by transplant       lactobacillus rhamnosus (GG) capsule     60 capsule    Take 1 capsule by mouth 2 times daily    Diarrhea       LANTUS SOLOSTAR 100 UNIT/ML injection   Generic drug:  insulin glargine     15 mL    Inject 22 Units Subcutaneous daily    Type 2 diabetes mellitus with diabetic polyneuropathy, with long-term current use of insulin (H)       levETIRAcetam 1000 MG Tabs     60 tablet    Take 1,000 mg by mouth 2 times daily    Encephalopathy acute, Status epilepticus (H)       levothyroxine 50 MCG tablet    SYNTHROID/LEVOTHROID    90 tablet    TAKE ONE TABLET BY MOUTH EVERY DAY    Other specified hypothyroidism       lidocaine 2 % topical gel    XYLOCAINE    30 mL    Apply topically as needed for moderate pain 10 ML every 3 weeks    Johnson catheter in place       loratadine 10 MG tablet    CLARITIN    90 tablet    Take 1 tablet (10 mg) by mouth daily    Acute nasopharyngitis       NovoLOG FLEXPEN 100 UNIT/ML injection   Generic drug:  insulin aspart     15 mL    Inject 2-8u sliding scale with meals and 1-4u sliding scale at bedtime    Type 2 diabetes mellitus with diabetic polyneuropathy, with long-term current use of insulin (H)       order for DME     3 catheter    Equipment being ordered: Johnson Catheters - 18 french    Johnson catheter status       * order for DME     100 pad    Equipment being ordered: Blue Chux [facility] send to [facility]    Fecal incontinence       * order for DME     1 each    Equipment being ordered: Ahuja FX CPAP interface (nasal pillows), size XS.    Obstructive  sleep apnea syndrome       * order for DME     12 each    Equipment being ordered: Silver Impregnated catheters HX of frequent UTI    Recurrent UTI       * order for DME     1 Device    High back guerrero wheel chair MICHELLE 99 DX: morbid obesity, generalized weakness, physical deconditioning, chronic vertigo    Morbid obesity, unspecified obesity type (H), Vertigo, Generalized muscle weakness, Physical deconditioning       pravastatin 10 MG tablet    PRAVACHOL    90 tablet    TAKE ONE TABLET BY MOUTH EVERY DAY    Hyperlipidemia LDL goal <100       predniSONE 5 MG tablet    DELTASONE    90 tablet    Take 1 tablet (5 mg) by mouth daily    Kidney replaced by transplant       simethicone 125 MG Chew chewable tablet    MYLICON    120 tablet    Take 1 tablet (125 mg) by mouth as needed for intestinal gas        TACROLIMUS PO      Take 2.5 mg by mouth 2 times daily        VITAMIN D (CHOLECALCIFEROL) PO      Take 4,000 Units by mouth 2 times daily        * Notice:  This list has 4 medication(s) that are the same as other medications prescribed for you. Read the directions carefully, and ask your doctor or other care provider to review them with you.

## 2018-03-28 ENCOUNTER — TELEPHONE (OUTPATIENT)
Dept: FAMILY MEDICINE | Facility: CLINIC | Age: 73
End: 2018-03-28

## 2018-03-28 NOTE — TELEPHONE ENCOUNTER
Call received from Shoshana (physical therapist) with  home care, stating that patient PT is on hold until the spinal X-ray and/or exam clears her of any fractures.  Shoshana contact info: [phone]      Rich Henderson

## 2018-03-29 DIAGNOSIS — Z94.0 KIDNEY REPLACED BY TRANSPLANT: ICD-10-CM

## 2018-03-29 DIAGNOSIS — I10 BENIGN ESSENTIAL HYPERTENSION: ICD-10-CM

## 2018-03-29 NOTE — TELEPHONE ENCOUNTER
Please call [facility], I ordered the x-ray a while a go and I have not seen any results.    Klaudia MTZ

## 2018-03-30 ENCOUNTER — TELEPHONE (OUTPATIENT)
Dept: FAMILY MEDICINE | Facility: CLINIC | Age: 73
End: 2018-03-30

## 2018-03-30 ENCOUNTER — NURSE TRIAGE (OUTPATIENT)
Dept: NURSING | Facility: CLINIC | Age: 73
End: 2018-03-30

## 2018-03-30 NOTE — TELEPHONE ENCOUNTER
No need to do x-ray at this time, please let PT know that they should move forward with her therapy.    Klaudia MTZ

## 2018-03-30 NOTE — TELEPHONE ENCOUNTER
ALEKSANDR Rudolph has called back.They called to schedule her xray and pt did not want the xray done.    I spoke to pt.  She feels she does not need it anymore. Her back feels better and she states she still has trouble with sitting up and rolling over but it really is much better. She is wearing her Chinese patches. She feels the xray would be a waste of therapy. She just was seen by homecare nurse and they decided to give it a week and see how it goes. She doesn't think about her back pain. She was given exercises by the physical therapist for her back and she has been doing them.     OK to hold off on xray now or do you feel it is needed ?    Amita Irby, RN, BSN

## 2018-03-30 NOTE — TELEPHONE ENCOUNTER
Klaudia,    Physical Therapist Shoshana given message from you to continue PT.  Due to hx of compression fractures therapist was concerned about doing PT without knowing if fx present.   If there is a fracture it changes what they would work on with her.     Physical Therapist  wants to know if they should restrict any activities or just use pain as their guide. She had been working on sitting in the wheelchair and using the christiana. Is it ok for them to continue on those activities.?    .Amita Irby, RN, BSN

## 2018-03-30 NOTE — TELEPHONE ENCOUNTER
Chang is calling and states that clinic has to change the code for billing for $85.00 for a telephone visit recently or insurance will not cover.  Chang is requesting to speak with [facility] direct.

## 2018-03-30 NOTE — TELEPHONE ENCOUNTER
Klaudia COLLINS    Called [facility] at [phone]. They do not want the order by fax but by phone. Order was given to [facility] as they had not received the order yet    If more than 5 stairs can't do xray - if they find this is the case they will call back to clinic to let us know otherwise they will go out and do xray.      Amita Irby, RN, BSN

## 2018-03-30 NOTE — TELEPHONE ENCOUNTER
Pt's spouse Chang called stating that he received a bill for $85 for the telephone visits with Klaudia. He spoke with [facility] and was told that the clinic need to call them with the correct billing code for telephone visits.   [facility] contact info: [phone]  Pt contact info: [phone]      Rich Henderson

## 2018-03-30 NOTE — TELEPHONE ENCOUNTER
I am not concerned about a compression fx, please continue activities and use pain as a guide     Klaudia MTZ

## 2018-03-30 NOTE — TELEPHONE ENCOUNTER
"Caller was asked to call:   Bbilling or payment questions, please call our helpline:  Monday - Friday, 8:00 a.m. to 4:30 p.m.  Phone: [phone]  Toll free: [phone]     He states \"so you are pushing it back on me, can't Klaudia change the code\"    I offered to call billing office for him. I did reach billing office at number listed above.  Latisse from billing will be looking into this and calling back. She will be calling  back.    Amita Irby, RN, BSN         "

## 2018-04-02 ENCOUNTER — TELEPHONE (OUTPATIENT)
Dept: FAMILY MEDICINE | Facility: CLINIC | Age: 73
End: 2018-04-02

## 2018-04-02 NOTE — TELEPHONE ENCOUNTER
"Writer called to relay message to Shoshana, Physical therapist, from [facility], from Klaudia Cano.     \"I am not concerned about a compression fx, please continue activities and use pain as a guide\"      Klaudia Barcenas FNP    No answer and Voice mailbox is full.        Thanks! Shayna Licona RN        "

## 2018-04-03 NOTE — TELEPHONE ENCOUNTER
Resume activity as tolerated.  Per Klaudia.  Use pain as guide.    Writer called mami back and informed.      Taqueria Araujo RN

## 2018-04-04 ENCOUNTER — ALLIED HEALTH/NURSE VISIT (OUTPATIENT)
Dept: PHARMACY | Facility: CLINIC | Age: 73
End: 2018-04-04
Payer: COMMERCIAL

## 2018-04-04 DIAGNOSIS — Z79.4 TYPE 2 DIABETES MELLITUS WITH COMPLICATION, WITH LONG-TERM CURRENT USE OF INSULIN (H): Primary | ICD-10-CM

## 2018-04-04 DIAGNOSIS — E11.8 TYPE 2 DIABETES MELLITUS WITH COMPLICATION, WITH LONG-TERM CURRENT USE OF INSULIN (H): Primary | ICD-10-CM

## 2018-04-04 DIAGNOSIS — M54.9 BACK PAIN, UNSPECIFIED BACK LOCATION, UNSPECIFIED BACK PAIN LATERALITY, UNSPECIFIED CHRONICITY: ICD-10-CM

## 2018-04-04 DIAGNOSIS — E11.42 TYPE 2 DIABETES MELLITUS WITH DIABETIC POLYNEUROPATHY, WITH LONG-TERM CURRENT USE OF INSULIN (H): ICD-10-CM

## 2018-04-04 DIAGNOSIS — Z79.4 TYPE 2 DIABETES MELLITUS WITH DIABETIC POLYNEUROPATHY, WITH LONG-TERM CURRENT USE OF INSULIN (H): ICD-10-CM

## 2018-04-04 LAB
ANION GAP SERPL CALCULATED.3IONS-SCNC: 3 MMOL/L (ref 3–14)
BASOPHILS # BLD AUTO: 0 10E9/L (ref 0–0.2)
BASOPHILS NFR BLD AUTO: 0.4 %
BUN SERPL-MCNC: 46 MG/DL (ref 7–30)
CALCIUM SERPL-MCNC: 8.9 MG/DL (ref 8.5–10.1)
CHLORIDE SERPL-SCNC: 106 MMOL/L (ref 94–109)
CO2 SERPL-SCNC: 27 MMOL/L (ref 20–32)
CREAT SERPL-MCNC: 1.24 MG/DL (ref 0.52–1.04)
DIFFERENTIAL METHOD BLD: ABNORMAL
EOSINOPHIL # BLD AUTO: 0.2 10E9/L (ref 0–0.7)
EOSINOPHIL NFR BLD AUTO: 3.3 %
ERYTHROCYTE [DISTWIDTH] IN BLOOD BY AUTOMATED COUNT: 13.5 % (ref 10–15)
GFR SERPL CREATININE-BSD FRML MDRD: 43 ML/MIN/1.7M2
GLUCOSE SERPL-MCNC: 351 MG/DL (ref 70–99)
HCT VFR BLD AUTO: 34.8 % (ref 35–47)
HGB BLD-MCNC: 11.3 G/DL (ref 11.7–15.7)
IMM GRANULOCYTES # BLD: 0 10E9/L (ref 0–0.4)
IMM GRANULOCYTES NFR BLD: 0.5 %
LYMPHOCYTES # BLD AUTO: 1.4 10E9/L (ref 0.8–5.3)
LYMPHOCYTES NFR BLD AUTO: 24.5 %
MCH RBC QN AUTO: 30.3 PG (ref 26.5–33)
MCHC RBC AUTO-ENTMCNC: 32.5 G/DL (ref 31.5–36.5)
MCV RBC AUTO: 93 FL (ref 78–100)
MONOCYTES # BLD AUTO: 0.4 10E9/L (ref 0–1.3)
MONOCYTES NFR BLD AUTO: 6.9 %
NEUTROPHILS # BLD AUTO: 3.6 10E9/L (ref 1.6–8.3)
NEUTROPHILS NFR BLD AUTO: 64.4 %
NRBC # BLD AUTO: 0 10*3/UL
NRBC BLD AUTO-RTO: 0 /100
PLATELET # BLD AUTO: 130 10E9/L (ref 150–450)
POTASSIUM SERPL-SCNC: 4.7 MMOL/L (ref 3.4–5.3)
RBC # BLD AUTO: 3.73 10E12/L (ref 3.8–5.2)
SODIUM SERPL-SCNC: 136 MMOL/L (ref 133–144)
WBC # BLD AUTO: 5.5 10E9/L (ref 4–11)

## 2018-04-04 PROCEDURE — 99606 MTMS BY PHARM EST 15 MIN: CPT | Performed by: PHARMACIST

## 2018-04-04 PROCEDURE — 85025 COMPLETE CBC W/AUTO DIFF WBC: CPT | Performed by: INTERNAL MEDICINE

## 2018-04-04 PROCEDURE — 80048 BASIC METABOLIC PNL TOTAL CA: CPT | Performed by: INTERNAL MEDICINE

## 2018-04-04 PROCEDURE — 99607 MTMS BY PHARM ADDL 15 MIN: CPT | Performed by: PHARMACIST

## 2018-04-04 PROCEDURE — 80197 ASSAY OF TACROLIMUS: CPT | Performed by: INTERNAL MEDICINE

## 2018-04-04 RX ORDER — LABETALOL 300 MG/1
150 TABLET, FILM COATED ORAL 2 TIMES DAILY
Qty: 120 TABLET | Refills: 3 | Status: SHIPPED | OUTPATIENT
Start: 2018-04-04 | End: 2018-12-28 | Stop reason: ALTCHOICE

## 2018-04-04 NOTE — TELEPHONE ENCOUNTER
"Requested Prescriptions   Pending Prescriptions Disp Refills     labetalol (NORMODYNE) 300 MG tablet 120 tablet 3     Sig: Take 0.5 tablets (150 mg) by mouth 2 times daily Take 1/2 tablet (150mg) =300mg. Hold for systolic BP less than 120.    Beta-Blockers Protocol Passed    4/4/2018  8:41 AM       Passed - Blood pressure under 140/90 in past 12 months    BP Readings from Last 3 Encounters:   01/25/18 137/63   01/15/18 156/70   12/25/17 134/51                Passed - Patient is age 6 or older       Passed - Recent (12 mo) or future (30 days) visit within the authorizing provider's specialty    Patient had office visit in the last 12 months or has a visit in the next 30 days with authorizing provider or within the authorizing provider's specialty.  See \"Patient Info\" tab in inbasket, or \"Choose Columns\" in Meds & Orders section of the refill encounter.            labetalol (NORMODYNE) 300 MG tablet      Last Written Prescription Date:  6/28/2017  Last Fill Quantity: 120,   # refills: 3  Last Office Visit: 3/9/2018 - virtual visit    HTN last addressed 2/5/2018 - plan states to discuss with pharm D at phone visit.          Routing refill request to provider for review/approval because:  Diagnosis not on refill protocol - Kidney replaced by transplant [Z94.0]       Thank you,  Carol Meneses RN    "

## 2018-04-04 NOTE — MR AVS SNAPSHOT
After Visit Summary   4/4/2018    Gem Jade    MRN: [id number]           Patient Information     Date Of Birth          1945        Visit Information        Provider Department      4/4/2018 3:30 PM Conchita Toledo, [facility]        Today's Diagnoses     Type 2 diabetes mellitus with complication, with long-term current use of insulin (H)    -  1    Back pain, unspecified back location, unspecified back pain laterality, unspecified chronicity        Type 2 diabetes mellitus with diabetic polyneuropathy, with long-term current use of insulin (H)          Care Instructions    Use Novolog 6 units in the morning if having waffles.  None with cottage cheese.    Use Novolog sliding scale with dinner:    6 units for blood sugar less than 200 or if no blood sugar test    8 units for blood sugar 200-300    10 units for blood sugar over 300          Follow-ups after your visit        Your next 10 appointments already scheduled     Apr 25, 2018  3:00 PM CDT   TELEMEDICINE with Conchita Toledo [facility] ([facility])    [address] [place] [id number] [phone]           Note: this is not an onsite visit; there is no need to come to the facility.            Apr 25, 2018  3:30 PM CDT   Telephone Visit with ANTHONY Antonio [facility] ([facility])    [address] [place] [id number] [phone]           Note: this is not an onsite visit; there is no need to come to the facility.              Who to contact     If you have questions or need follow up information about today's clinic visit or your schedule please contact [facility] directly at [phone].  Normal or non-critical lab and imaging results will be  communicated to you by Yapp[employer], letter or phone within 4 business days after the clinic has received the results. If you do not hear from us within 7 days, please contact the clinic through [employer] or phone. If you have a critical or abnormal lab result, we will notify you by phone as soon as possible.  Submit refill requests through [employer] or call your pharmacy and they will forward the refill request to us. Please allow 3 business days for your refill to be completed.          Additional Information About Your Visit        [employer] Information     [employer] gives you secure access to your electronic health record. If you see a primary care provider, you can also send messages to your care team and make appointments. If you have questions, please call your primary care clinic.  If you do not have a primary care provider, please call [phone] and they will assist you.        Care EveryWhere ID     This is your Care EveryWhere ID. This could be used by other organizations to access your [facility] medical records  [id number]         Blood Pressure from Last 3 Encounters:   01/25/18 137/63   01/15/18 156/70   12/25/17 134/51    Weight from Last 3 Encounters:   01/25/18 233 lb 7.5 oz (105.9 kg)   01/14/18 226 lb 14.4 oz (102.9 kg)   12/23/17 238 lb (108 kg)              Today, you had the following     No orders found for display         Today's Medication Changes          These changes are accurate as of 4/4/18 11:59 PM.  If you have any questions, ask your nurse or doctor.               These medicines have changed or have updated prescriptions.        Dose/Directions    NovoLOG FLEXPEN 100 UNIT/ML injection   This may have changed:  additional instructions   Used for:  Type 2 diabetes mellitus with diabetic polyneuropathy, with long-term current use of insulin (H)   Generic drug:  insulin aspart   Changed by:  Conchita Toledo [facility]        Inject 6-10 units before meals twice daily as directed   Quantity:   15 mL   Refills:  11                Primary Care Provider Office Phone # Fax #    Marivel Barcenas, APRN [facility] [phone]       [address] [facility] [address] [place] [phone]        Equal Access to Services     CARLOS ENRIQUE GREEN : Hadii aad ku hadchristinao Soomaali, waaxda luqadaha, qaybta kaalmada adeegyada, waxfazal idiin haybrittaneyn adeblaine harrell lauren . So [facility] [phone].    ATENCIÓN: Si habla español, tiene a peguero disposición servicios gratuitos de asistencia lingüística. Llame al [phone].    We comply with applicable federal civil rights laws and [place] laws. We do not discriminate on the basis of race, color, national origin, age, disability, sex, sexual orientation, or gender identity.            Thank you!     Thank you for choosing [facility]  for your care. Our goal is always to provide you with excellent care. Hearing back from our patients is one way we can continue to improve our services. Please take a few minutes to complete the written survey that you may receive in the mail after your visit with us. Thank you!             Your Updated Medication List - Protect others around you: Learn how to safely use, store and throw away your medicines at www.disposemymeds.org.          This list is accurate as of 4/4/18 11:59 PM.  Always use your most recent med list.                   Brand Name Dispense Instructions for use Diagnosis    ACCU-CHEK COMPACT PLUS test strip   Generic drug:  blood glucose monitoring     100 strip    Use to test blood sugar 3 times daily or as directed.  Ok to substitute alternative if insurance prefers.        ACETAMINOPHEN PO      Take 325-650 mg by mouth every 4 hours as needed.        aspirin 81 MG chewable tablet     90 tablet    Take 1 tablet (81 mg) by mouth daily Starting 1 week after your kidney biopsy    Pulmonary embolism and infarction (H), Diabetes mellitus with background retinopathy (H)       baclofen 10 MG tablet     LIORESAL    90 tablet    Take 1 tablet (10 mg) by mouth 3 times daily as needed for muscle spasms        cloNIDine 0.1 MG tablet    CATAPRES    180 tablet    TAKE ONE TABLET BY MOUTH TWICE A DAY    HTN, goal below 140/90       Cranberry 400 MG Tabs      Take 400 mg by mouth 2 times daily        cyanocobalamin 1000 MCG/ML injection    VITAMIN B12    3 mL    Inject 1 mL (1,000 mcg) into the muscle every 30 days    Iron deficiency anemia, unspecified iron deficiency anemia type       diphenoxylate-atropine 2.5-0.025 MG per tablet    LOMOTIL    60 tablet    After the third watery stool start Lomotil every 4 hours until cleared        famotidine 20 MG tablet    PEPCID    60 tablet    Take 1 tablet (20 mg) by mouth 2 times daily        gabapentin 300 MG capsule    NEURONTIN    360 capsule    Take 2 capsules (600 mg) by mouth 2 times daily For pain    Diabetic polyneuropathy associated with type 2 diabetes mellitus (H)       hydrocortisone 1%/eucerin 1% compounded cream     30 g    Apply topically 2 times daily    Rash and nonspecific skin eruption       insulin pen needle 30G X 8 MM     100 each    Use as directed with Lantus    Type 2 diabetes mellitus with diabetic nephropathy (H)       labetalol 300 MG tablet    NORMODYNE    120 tablet    Take 0.5 tablets (150 mg) by mouth 2 times daily Take 1/2 tablet (150mg) =300mg. Hold for systolic BP less than 120.    Benign essential hypertension, Kidney replaced by transplant       lactobacillus rhamnosus (GG) capsule     60 capsule    Take 1 capsule by mouth 2 times daily    Diarrhea       LANTUS SOLOSTAR 100 UNIT/ML injection   Generic drug:  insulin glargine     15 mL    Inject 22 Units Subcutaneous daily    Type 2 diabetes mellitus with diabetic polyneuropathy, with long-term current use of insulin (H)       levETIRAcetam 1000 MG Tabs     60 tablet    Take 1,000 mg by mouth 2 times daily    Encephalopathy acute, Status epilepticus (H)       levothyroxine 50 MCG tablet     SYNTHROID/LEVOTHROID    90 tablet    TAKE ONE TABLET BY MOUTH EVERY DAY    Other specified hypothyroidism       lidocaine 2 % topical gel    XYLOCAINE    30 mL    Apply topically as needed for moderate pain 10 ML every 3 weeks    Johnson catheter in place       loratadine 10 MG tablet    CLARITIN    90 tablet    Take 1 tablet (10 mg) by mouth daily    Acute nasopharyngitis       NovoLOG FLEXPEN 100 UNIT/ML injection   Generic drug:  insulin aspart     15 mL    Inject 6-10 units before meals twice daily as directed    Type 2 diabetes mellitus with diabetic polyneuropathy, with long-term current use of insulin (H)       order for DME     3 catheter    Equipment being ordered: Johnson Catheters - 18 french    Johnson catheter status       * order for DME     100 pad    Equipment being ordered: Blue Chux Please send to [facility]    Fecal incontinence       * order for DME     1 each    Equipment being ordered: Converged Access FX CPAP interface (nasal pillows), size XS.    Obstructive sleep apnea syndrome       * order for DME     12 each    Equipment being ordered: Silver Impregnated catheters HX of frequent UTI    Recurrent UTI       * order for DME     1 Device    High back guerrero wheel chair MICHELLE 99 DX: morbid obesity, generalized weakness, physical deconditioning, chronic vertigo    Morbid obesity, unspecified obesity type (H), Vertigo, Generalized muscle weakness, Physical deconditioning       pravastatin 10 MG tablet    PRAVACHOL    90 tablet    TAKE ONE TABLET BY MOUTH EVERY DAY    Hyperlipidemia LDL goal <100       predniSONE 5 MG tablet    DELTASONE    90 tablet    Take 1 tablet (5 mg) by mouth daily    Kidney replaced by transplant       simethicone 125 MG Chew chewable tablet    MYLICON    120 tablet    Take 1 tablet (125 mg) by mouth as needed for intestinal gas        TACROLIMUS PO      Take 2.5 mg by mouth 2 times daily        VITAMIN D (CHOLECALCIFEROL) PO      Take 4,000 Units by mouth 2 times daily        * Notice:   This list has 4 medication(s) that are the same as other medications prescribed for you. Read the directions carefully, and ask your doctor or other care provider to review them with you.

## 2018-04-05 LAB
TACROLIMUS BLD-MCNC: 4.8 UG/L (ref 5–15)
TME LAST DOSE: ABNORMAL H

## 2018-04-05 NOTE — PATIENT INSTRUCTIONS
Use Novolog 6 units in the morning if having waffles.  None with cottage cheese.    Use Novolog sliding scale with dinner:    6 units for blood sugar less than 200 or if no blood sugar test    8 units for blood sugar 200-300    10 units for blood sugar over 300

## 2018-04-05 NOTE — PROGRESS NOTES
SUBJECTIVE/OBJECTIVE:                Gem Jade is a 72 year old female called for a follow-up visit for Medication Therapy Management.  She was referred to me from Marivel Barcenas.  Keshawn also present on the phone call.     Chief Complaint: Follow up from our visit on 3/21/18.  Diabetes f/u    Tobacco: History of tobacco dependence - quit  Alcohol: not currently using    Medication Adherence/Access:  Patient has assistance with medication administration and setting up boxes (HCRN).    Back pain:  Not currently taking any medications. Pain has continued to improve. OT/PT not restarted but will discuss for next week.     Diabetes: Currently taking lantus 22u subQ daily and novolog 2-8u with dinner per sliding scale. Continues to have difficulty remembering to test BS and use sliding scale. Estimates Novolog use 80-90% of the time.  SMBG: zero to two times daily.  Ranges (patient reported):     Date FBG/ 2hours post Lunch/2hours post Dinner /2hours post Bedtime   4/4 443      4/3   324     200   492      234 446      324     158  202     -        Patient is not experiencing hypoglycemia  Recent symptoms of high blood sugar? none  Eye exam: due  Foot exam: due  Microalbumin is not < 30 mg/g. Pt is not taking an ACEi/ARB.  Aspirin: Taking 81mg daily and denies side effects  Diet/Exercise: appetite variable but has improved, eating more carbohydrate. Breakfast either has cottage cheese or waffles.      Current labs include:  Today's Vitals: There were no vitals taken for this visit. - phone call  BP Readings from Last 3 Encounters:   01/25/18 137/63   01/15/18 156/70   12/25/17 134/51     Lab Results   Component Value Date    A1C 6.3 01/12/2018   .  Lab Results   Component Value Date    CHOL 164 12/05/2016     Lab Results   Component Value Date    TRIG 272 12/05/2016     Lab Results   Component Value Date    HDL 46 12/05/2016     Lab Results   Component Value Date    LDL 64 12/05/2016       Liver  Function Studies -   Recent Labs   Lab Test  01/20/18   0720   PROTTOTAL  7.1   ALBUMIN  2.6*   BILITOTAL  0.4   ALKPHOS  106   AST  22   ALT  20       Lab Results   Component Value Date    UCRR 43 11/16/2017    MICROL 1850 04/26/2017    UMALCR 6776.56 (H) 04/26/2017       Last Basic Metabolic Panel:  Lab Results   Component Value Date     04/04/2018      Lab Results   Component Value Date    POTASSIUM 4.7 04/04/2018     Lab Results   Component Value Date    CHLORIDE 106 04/04/2018     Lab Results   Component Value Date    BUN 46 04/04/2018     Lab Results   Component Value Date    CR 1.24 04/04/2018     GFR Estimate   Date Value Ref Range Status   04/04/2018 43 (L) >60 mL/min/1.7m2 Final     Comment:     Non  GFR Calc   03/06/2018 39 (L) >60 mL/min/1.7m2 Final     Comment:     Non  GFR Calc   02/13/2018 34 (L) >60 mL/min/1.7m2 Final     Comment:     Non  GFR Calc     GFR Estimate If Black   Date Value Ref Range Status   04/04/2018 52 (L) >60 mL/min/1.7m2 Final     Comment:      GFR Calc   03/06/2018 47 (L) >60 mL/min/1.7m2 Final     Comment:      GFR Calc   02/13/2018 41 (L) >60 mL/min/1.7m2 Final     Comment:      GFR Calc     TSH   Date Value Ref Range Status   01/19/2018 1.31 0.40 - 4.00 mU/L Final   ]    Most Recent Immunizations   Administered Date(s) Administered     HepB 01/13/2003     Influenza (H1N1) 11/25/2009     Influenza (High Dose) 3 valent vaccine 10/07/2016     Influenza (IIV3) PF 11/05/2011     Influenza Vaccine IM 3yrs+ 4 Valent IIV4 10/16/2013     Pneumo Conj 13-V (2010&after) 12/01/2015     Pneumococcal 23 valent 06/17/2013     TDAP Vaccine (Adacel) 06/17/2013     Zoster vaccine, live 06/17/2013       ASSESSMENT:              Current medications were reviewed today as discussed above.      Medication Adherence: good, no issues identified    Back pain: improved. Pt will discuss restarting PT/OT  next week with HCRN.     diabetes: needs improvement. A1c at goal <8% but SMBG persistently high. Chronic underuse of Novolog due to difficulty adhering to BS testing schedule. Pt would benefit from increased use of Novolog and increase BS monitoring.        PLAN:                  Use Novolog 6 units in the morning with waffles  Change Novolog with dinner to 6 units BS<200 or with no BS test, 8u 200-300, 10u >300    I spent 30 minutes with this patient today. All changes were made via collaborative practice agreement with Marivel Barcenas. A copy of the visit note was provided to the patient's primary care provider.     Will follow up in 3 weeks.    The patient was sent via [employer] a summary of these recommendations as an after visit summary.    Conchita Toledo, PharmD, BCACP

## 2018-04-10 ENCOUNTER — TELEPHONE (OUTPATIENT)
Dept: FAMILY MEDICINE | Facility: CLINIC | Age: 73
End: 2018-04-10

## 2018-04-10 NOTE — TELEPHONE ENCOUNTER
Writer called Yashira back with [facility] and gave VO's as requested for HC.    Taqueria Araujo RN

## 2018-04-10 NOTE — TELEPHONE ENCOUNTER
Reason for Call:  Home Health Care    Yashira with [facility] called regarding:    Skilled Nursing:  Once every 3 weeks X 9 weeks and 8 as needed for cath changes     Home Health Aide: Twice a week X 8 weeks    Pt Provider: Kladuia Barcenas    Phone Number Homecare Nurse can be reached at: [phone]    Can we leave a detailed message on this number? YES    Phone number patient can be reached at: Home number on file [phone] (home)    Best Time: anytime    Call taken on 4/10/2018 at 3:53 PM by Rich Henderson

## 2018-04-17 NOTE — TELEPHONE ENCOUNTER
Call from pt's spouse (Keshawn) stating that today he spoke with the billing office and was told  that Klaudia needs to change the billing code from  #90226 to Telemedicine. Pt want a call back today from Klaudia or her nurse to make sure this will get done today. Pt also stated that he's called the clinic multiple times and no one has return his call on this issue.   Contact info:  [phone]      Rich Henderson

## 2018-04-18 NOTE — TELEPHONE ENCOUNTER
Will forward to PCP to review and advise.    Klaudia are you able to change the billing code or able to?    Taqueria Araujo RN

## 2018-04-19 NOTE — TELEPHONE ENCOUNTER
TC to  to explain billing for phone visits, there is no billing code to change, he will reach back out to his insurance to clarify with them, there were a couple of dates where he was charged for a phone visit that did not happen.  He is OK with continuing to pay for phone visits, patient is making progress getting up in her chair but is not at a point where she can come into the clinic    Klaudia MTZ

## 2018-04-25 ENCOUNTER — ALLIED HEALTH/NURSE VISIT (OUTPATIENT)
Dept: PHARMACY | Facility: CLINIC | Age: 73
End: 2018-04-25
Payer: COMMERCIAL

## 2018-04-25 ENCOUNTER — VIRTUAL VISIT (OUTPATIENT)
Dept: FAMILY MEDICINE | Facility: CLINIC | Age: 73
End: 2018-04-25
Payer: COMMERCIAL

## 2018-04-25 DIAGNOSIS — M25.519 CHRONIC SHOULDER PAIN, UNSPECIFIED LATERALITY: ICD-10-CM

## 2018-04-25 DIAGNOSIS — G89.29 CHRONIC SHOULDER PAIN, UNSPECIFIED LATERALITY: ICD-10-CM

## 2018-04-25 DIAGNOSIS — Z79.4 TYPE 2 DIABETES MELLITUS WITH COMPLICATION, WITH LONG-TERM CURRENT USE OF INSULIN (H): Primary | ICD-10-CM

## 2018-04-25 DIAGNOSIS — Z94.0 KIDNEY REPLACED BY TRANSPLANT: ICD-10-CM

## 2018-04-25 DIAGNOSIS — I10 BENIGN ESSENTIAL HYPERTENSION: ICD-10-CM

## 2018-04-25 DIAGNOSIS — N39.0 RECURRENT UTI: ICD-10-CM

## 2018-04-25 DIAGNOSIS — E11.8 TYPE 2 DIABETES MELLITUS WITH COMPLICATION, WITH LONG-TERM CURRENT USE OF INSULIN (H): Primary | ICD-10-CM

## 2018-04-25 DIAGNOSIS — T14.8XXA OPEN WOUND: ICD-10-CM

## 2018-04-25 DIAGNOSIS — E11.42 DIABETIC POLYNEUROPATHY ASSOCIATED WITH TYPE 2 DIABETES MELLITUS (H): Primary | ICD-10-CM

## 2018-04-25 PROCEDURE — 99606 MTMS BY PHARM EST 15 MIN: CPT | Performed by: PHARMACIST

## 2018-04-25 PROCEDURE — 98967 PH1 ASSMT&MGMT NQHP 11-20: CPT | Performed by: NURSE PRACTITIONER

## 2018-04-25 NOTE — MR AVS SNAPSHOT
After Visit Summary   4/25/2018    Gem aJde    MRN: [id number]           Patient Information     Date Of Birth          1945        Visit Information        Provider Department      4/25/2018 3:00 PM Conchita Toledo, [facility]        Today's Diagnoses     Type 2 diabetes mellitus with complication, with long-term current use of insulin (H)    -  1    Benign essential hypertension           Follow-ups after your visit        Your next 10 appointments already scheduled     May 30, 2018  3:30 PM CDT   Telephone Visit with ANTHONY Antonio [facility] ([facility])    [address] [place] [address]  [address] [place] [address] [phone]           Note: this is not an onsite visit; there is no need to come to the facility.            Jun 06, 2018  3:30 PM CDT   TELEMEDICINE with Conchita Toledo [facility] ([facility])    [address] [place] [address] [phone]           Note: this is not an onsite visit; there is no need to come to the facility.              Who to contact     If you have questions or need follow up information about today's clinic visit or your schedule please contact [facility] directly at [phone].  Normal or non-critical lab and imaging results will be communicated to you by MyChart, letter or phone within 4 business days after the clinic has received the results. If you do not hear from us within 7 days, please contact the clinic through MyChart or phone. If you have a critical or abnormal lab result, we will notify you by phone as soon as possible.  Submit refill requests through [employer] or call your pharmacy and they will forward the refill request to us. Please allow 3 business days for your refill  to be completed.          Additional Information About Your Visit        Ledzworldhart Information     [employer] gives you secure access to your electronic health record. If you see a primary care provider, you can also send messages to your care team and make appointments. If you have questions, please call your primary care clinic.  If you do not have a primary care provider, please call [phone] and they will assist you.        Care EveryWhere ID     This is your Care EveryWhere ID. This could be used by other organizations to access your [place] medical records  [id number]         Blood Pressure from Last 3 Encounters:   01/25/18 137/63   01/15/18 156/70   12/25/17 134/51    Weight from Last 3 Encounters:   01/25/18 233 lb 7.5 oz (105.9 kg)   01/14/18 226 lb 14.4 oz (102.9 kg)   12/23/17 238 lb (108 kg)              Today, you had the following     No orders found for display       Primary Care Provider Office Phone # Fax #    Marivel Barcenas, APRN [facility] [phone]       [address] [facility] [address]  [facility] [address]        Equal Access to Services     [facility]: Hadii aad ku hadasho Soomaali, waaxda luqadaha, qaybta kaalmada adeegyada, jessica carrion hayjessie aguilar . So [facility] [phone].    ATENCIÓN: Si habla español, tiene a peguero disposición servicios gratuitos de asistencia lingüística. Carmine al [phone].    We comply with applicable federal civil rights laws and [place] laws. We do not discriminate on the basis of race, color, national origin, age, disability, sex, sexual orientation, or gender identity.            Thank you!     Thank you for choosing [facility]  for your care. Our goal is always to provide you with excellent care. Hearing back from our patients is one way we can continue to improve our services. Please take a few minutes to complete the written survey that you may receive in the mail after your visit with us. Thank  you!             Your Updated Medication List - Protect others around you: Learn how to safely use, store and throw away your medicines at www.disposemymeds.org.          This list is accurate as of 4/25/18 11:59 PM.  Always use your most recent med list.                   Brand Name Dispense Instructions for use Diagnosis    ACCU-CHEK COMPACT PLUS test strip   Generic drug:  blood glucose monitoring     100 strip    Use to test blood sugar 3 times daily or as directed.  Ok to substitute alternative if insurance prefers.        ACETAMINOPHEN PO      Take 325-650 mg by mouth every 4 hours as needed.        aspirin 81 MG chewable tablet     90 tablet    Take 1 tablet (81 mg) by mouth daily Starting 1 week after your kidney biopsy    Pulmonary embolism and infarction (H), Diabetes mellitus with background retinopathy (H)       baclofen 10 MG tablet    LIORESAL    90 tablet    Take 1 tablet (10 mg) by mouth 3 times daily as needed for muscle spasms        cloNIDine 0.1 MG tablet    CATAPRES    180 tablet    TAKE ONE TABLET BY MOUTH TWICE A DAY    HTN, goal below 140/90       Cranberry 400 MG Tabs      Take 400 mg by mouth 2 times daily        cyanocobalamin 1000 MCG/ML injection    VITAMIN B12    3 mL    Inject 1 mL (1,000 mcg) into the muscle every 30 days    Iron deficiency anemia, unspecified iron deficiency anemia type       diphenoxylate-atropine 2.5-0.025 MG per tablet    LOMOTIL    60 tablet    After the third watery stool start Lomotil every 4 hours until cleared        famotidine 20 MG tablet    PEPCID    60 tablet    Take 1 tablet (20 mg) by mouth 2 times daily        gabapentin 300 MG capsule    NEURONTIN    360 capsule    Take 2 capsules (600 mg) by mouth 2 times daily For pain    Diabetic polyneuropathy associated with type 2 diabetes mellitus (H)       hydrocortisone 1%/eucerin 1% compounded cream     30 g    Apply topically 2 times daily    Rash and nonspecific skin eruption       insulin pen needle 30G X  8 MM     100 each    Use as directed with Lantus    Type 2 diabetes mellitus with diabetic nephropathy (H)       labetalol 300 MG tablet    NORMODYNE    120 tablet    Take 0.5 tablets (150 mg) by mouth 2 times daily Take 1/2 tablet (150mg) =300mg. Hold for systolic BP less than 120.    Benign essential hypertension, Kidney replaced by transplant       lactobacillus rhamnosus (GG) capsule     60 capsule    Take 1 capsule by mouth 2 times daily    Diarrhea       LANTUS SOLOSTAR 100 UNIT/ML injection   Generic drug:  insulin glargine     15 mL    Inject 22 Units Subcutaneous daily    Type 2 diabetes mellitus with diabetic polyneuropathy, with long-term current use of insulin (H)       levETIRAcetam 1000 MG Tabs     60 tablet    Take 1,000 mg by mouth 2 times daily    Encephalopathy acute, Status epilepticus (H)       levothyroxine 50 MCG tablet    SYNTHROID/LEVOTHROID    90 tablet    TAKE ONE TABLET BY MOUTH EVERY DAY    Other specified hypothyroidism       lidocaine 2 % topical gel    XYLOCAINE    30 mL    Apply topically as needed for moderate pain 10 ML every 3 weeks    Johnson catheter in place       loratadine 10 MG tablet    CLARITIN    90 tablet    Take 1 tablet (10 mg) by mouth daily    Acute nasopharyngitis       NovoLOG FLEXPEN 100 UNIT/ML injection   Generic drug:  insulin aspart     15 mL    Inject 6-10 units before meals twice daily as directed    Type 2 diabetes mellitus with diabetic polyneuropathy, with long-term current use of insulin (H)       order for DME     3 catheter    Equipment being ordered: Johnson Catheters - 18 french    Johnson catheter status       * order for DME     100 pad    Equipment being ordered: Blue Chux [facility]    Fecal incontinence       * order for DME     1 each    Equipment being ordered: [employer] FX CPAP interface (nasal pillows), size XS.    Obstructive sleep apnea syndrome       * order for DME     12 each    Equipment being ordered: Silver Impregnated  catheters HX of frequent UTI    Recurrent UTI       * order for DME     1 Device    High back guerrero wheel chair MICHELLE 99 DX: morbid obesity, generalized weakness, physical deconditioning, chronic vertigo    Morbid obesity, unspecified obesity type (H), Vertigo, Generalized muscle weakness, Physical deconditioning       pravastatin 10 MG tablet    PRAVACHOL    90 tablet    TAKE ONE TABLET BY MOUTH EVERY DAY    Hyperlipidemia LDL goal <100       predniSONE 5 MG tablet    DELTASONE    90 tablet    Take 1 tablet (5 mg) by mouth daily    Kidney replaced by transplant       simethicone 125 MG Chew chewable tablet    MYLICON    120 tablet    Take 1 tablet (125 mg) by mouth as needed for intestinal gas        TACROLIMUS PO      Take 2.5 mg by mouth 2 times daily        VITAMIN D (CHOLECALCIFEROL) PO      Take 4,000 Units by mouth 2 times daily        * Notice:  This list has 4 medication(s) that are the same as other medications prescribed for you. Read the directions carefully, and ask your doctor or other care provider to review them with you.

## 2018-04-25 NOTE — PROGRESS NOTES
"Gem Jade is a 72 year old female who is being evaluated via a telephone visit.      The patient has been notified of following:     \"This telephone visit will be conducted via a call between you and your physician/provider. We have found that certain health care needs can be provided without the need for a physical exam.  This service lets us provide the care you need with a short phone conversation.  If a prescription is necessary we can send it directly to your pharmacy.  If lab work is needed we can place an order for that and you can then stop by our lab to have the test done at a later time.    We will bill your insurance company for this service.  Please check with your medical insurance if this type of visit is covered. You may be responsible for the cost of this type of visit if insurance coverage is denied.  The typical cost is $30 (10min), $59 (11-20min) and $85 (21-30min).  Most often these visits are shorter than 10 minutes.    If during the course of the call the physician/provider feels a telephone visit is not appropriate, you will not be charged for this service.\"       Consent has been obtained for this service by care team member: yes.   See the scanned image in the medical record.    Gem Jade complains of  No chief complaint on file.      I have reviewed and updated the patient's Past Medical History, Social History, Family History and Medication List.    ALLERGIES  Bactrim ds [sulfamethoxazole w/trimethoprim]; Atorvastatin calcium; Codeine; Darvocet [propoxyphene n-apap]; Hydromorphone; Levofloxacin; Morphine; Niaspan [niacin]; Percocet [oxycodone-acetaminophen]; and Vicodin [hydrocodone-acetaminophen]    Peyton Raymond CMA   (MA signature)    Additional provider notes:    Getting up in her chair now, 2x now for about one hour, using christiana lift, training , vertigo minimal, tilting chair back with dizziness, no nausea/vomiting, PT and OT working with her 2x a week, PCA 2x week  "     WOUND/ SACRAL Area  Duration of complaint: skin tear, sacral area, using Calmoseptine with good results, working with [facility]      Joint or Musculoskeletal Pain  Duration of complaint: shoulder pain has improved, right one, working on turning self, no longer needing Chinese patches or extra acetaminophen, doesn't really like tylenol makes her sick, very few muscle spasms occ takes baclofen with cath changes every 3 weeks, still struggling with dental issues, tooth fell out, but no pain or discharge      Diarrhea  Duration of complaint: improved, rarely using lomotil, no need for stool aids right now      Diabetes Follow-up       Patient is checking blood sugars: three times daily. Taking 22u of Lantus and 6-10u of novolog 1-2x daily      Blood sugar testing frequency justification: Uncontrolled diabetes, using sliding scale x 1 a day only and lantus     Results are as follows: eating only 2x a day         lunchtime ( fasting) - 140-300           suppertime - 79- 400, but only one          bedtime - none    Diabetic concerns: frequent infections and blood sugar frequently over 200, eating breakfast- oatmeal, omelet, lunch cottage cheese before dinner, then dinner and a snack before bed sugar free jocelyn,       Symptoms of hypoglycemia (low blood sugar): none     Paresthesias (numbness or burning in feet) or sores: Yes slight increase now, increase in her neuropathy when she wakes up 7am but pain does not wake her up, comes and goes, not every day, taking gabapentin 600mg 2x daily, 12pm and 12am and stopped her trileptal and tramadol, and baclofen only with cath changes,  not interested in increasing her gabapentin right now, sx have improved      Date of last diabetic eye exam: unsure    Hypertension Follow-up       Outpatient blood pressures are being checked at home.  Results are WNL, 120-140/70's    Low Salt Diet: no added salt     Depression and Anxiety Follow-Up    Status since last visit: Stable,no more  confusion     Other associated symptoms: none    Complicating factors: none    Significant life event: Yes-  Multiple UTI's                    Current substance abuse: None    KIDNEY TRANSPLANT        Duration: right kidney replaced, left one non functional     Description (location/character/radiation): chronic managed by kidney transplant team,      Intensity:  moderate    Accompanying signs and symptoms: fatigue,   History (similar episodes/previous evaluation):  kidney transplant 10/29/1999        Precipitating or alleviating factors: DM,    Therapies tried and outcome: see transplant team notes, recent labs have improved     Assessment/Plan:  ASSESSMENT / PLAN:  (E11.42) Diabetic polyneuropathy associated with type 2 diabetes mellitus (H)  (primary encounter diagnosis)  Comment: still high RBG but better   Plan: will continue to work with PharmD to adjust meds, encourage activity as tolerated, diet discussed     (M25.519,  G89.29) Chronic shoulder pain, unspecified laterality  Comment: improved with chinese patches, no longer using  Plan: monitor     (I10) Benign essential hypertension  Comment: stable,  Plan: maintain current meds with parameters     (N39.0) Recurrent UTI  Comment: no sx   Plan: monitor     (Z94.0) Kidney replaced by transplant  Comment: followed by nephrology, labs improving   Plan: monitor     (T14.8XXA) Open wound  Comment: chronic off and on   Plan: continue Calmoseptine, have [facility] call if worsens    Patient Instructions   No med changes today    Keep working on getting up in your chair    I will call you again in 6 weeks       I have reviewed the note as documented above.  This accurately captures the substance of my conversation with the patient,  Klaudia MTZ      Total time of call between patient and provider was 20 minutes

## 2018-04-25 NOTE — MR AVS SNAPSHOT
After Visit Summary   4/25/2018    Gem Jade    MRN: [id number]           Patient Information     Date Of Birth          1945        Visit Information        Provider Department      4/25/2018 3:00 PM Marivel Barcenas APRN CNP [facility]        Today's Diagnoses     Diabetic polyneuropathy associated with type 2 diabetes mellitus (H)    -  1    Chronic shoulder pain, unspecified laterality        Benign essential hypertension        Recurrent UTI        Kidney replaced by transplant        Open wound          Care Instructions    No med changes today    Keep working on getting up in your chair    I will call you again in 6 weeks           Follow-ups after your visit        Your next 10 appointments already scheduled     May 30, 2018  3:30 PM CDT   Telephone Visit with ANTHONY Antonio CNP   [facility]    [address] [place] [address] [phone]           Note: this is not an onsite visit; there is no need to come to the facility.            Jun 06, 2018  3:30 PM CDT   TELEMEDICINE with Conchita Toledo [facility] ([facility]    [address] [place] [address] [phone]           Note: this is not an onsite visit; there is no need to come to the facility.              Who to contact     If you have questions or need follow up information about today's clinic visit or your schedule please contact [facility] directly at [phone].  Normal or non-critical lab and imaging results will be communicated to you by MyChart, letter or phone within 4 business days after the clinic has received the results. If you do not hear from us within 7 days, please contact the clinic through MyChart or phone. If you  have a critical or abnormal lab result, we will notify you by phone as soon as possible.  Submit refill requests through [employer] or call your pharmacy and they will forward the refill request to us. Please allow 3 business days for your refill to be completed.          Additional Information About Your Visit        RuiYihart Information     [employer] gives you secure access to your electronic health record. If you see a primary care provider, you can also send messages to your care team and make appointments. If you have questions, please call your primary care clinic.  If you do not have a primary care provider, please call [phone] and they will assist you.        Care EveryWhere ID     This is your Care EveryWhere ID. This could be used by other organizations to access your [place] medical records  [id number]         Blood Pressure from Last 3 Encounters:   01/25/18 137/63   01/15/18 156/70   12/25/17 134/51    Weight from Last 3 Encounters:   01/25/18 233 lb 7.5 oz (105.9 kg)   01/14/18 226 lb 14.4 oz (102.9 kg)   12/23/17 238 lb (108 kg)              Today, you had the following     No orders found for display       Primary Care Provider Office Phone # Fax #    ANTHONY Antonio [facility] [phone]       [address] [facility] [address]  [facility] [address]        Equal Access to Services     [facility] : Hadii aad ku hadasho Soomaali, waaxda luqadaha, qaybta kaalmada adeegyada, jessica aguilar . So [facility] [place] [phone].    ATENCIÓN: Si habla español, tiene a peguero disposición servicios gratuitos de asistencia lingüística. Llame al [phone].    We comply with applicable federal civil rights laws and [place] laws. We do not discriminate on the basis of race, color, national origin, age, disability, sex, sexual orientation, or gender identity.            Thank you!     Thank you for choosing [facility]  for your care. Our goal is always to  provide you with excellent care. Hearing back from our patients is one way we can continue to improve our services. Please take a few minutes to complete the written survey that you may receive in the mail after your visit with us. Thank you!             Your Updated Medication List - Protect others around you: Learn how to safely use, store and throw away your medicines at www.disposemymeds.org.          This list is accurate as of 4/25/18 11:59 PM.  Always use your most recent med list.                   Brand Name Dispense Instructions for use Diagnosis    ACCU-CHEK COMPACT PLUS test strip   Generic drug:  blood glucose monitoring     100 strip    Use to test blood sugar 3 times daily or as directed.  Ok to substitute alternative if insurance prefers.        ACETAMINOPHEN PO      Take 325-650 mg by mouth every 4 hours as needed.        aspirin 81 MG chewable tablet     90 tablet    Take 1 tablet (81 mg) by mouth daily Starting 1 week after your kidney biopsy    Pulmonary embolism and infarction (H), Diabetes mellitus with background retinopathy (H)       baclofen 10 MG tablet    LIORESAL    90 tablet    Take 1 tablet (10 mg) by mouth 3 times daily as needed for muscle spasms        cloNIDine 0.1 MG tablet    CATAPRES    180 tablet    TAKE ONE TABLET BY MOUTH TWICE A DAY    HTN, goal below 140/90       Cranberry 400 MG Tabs      Take 400 mg by mouth 2 times daily        cyanocobalamin 1000 MCG/ML injection    VITAMIN B12    3 mL    Inject 1 mL (1,000 mcg) into the muscle every 30 days    Iron deficiency anemia, unspecified iron deficiency anemia type       diphenoxylate-atropine 2.5-0.025 MG per tablet    LOMOTIL    60 tablet    After the third watery stool start Lomotil every 4 hours until cleared        famotidine 20 MG tablet    PEPCID    60 tablet    Take 1 tablet (20 mg) by mouth 2 times daily        gabapentin 300 MG capsule    NEURONTIN    360 capsule    Take 2 capsules (600 mg) by mouth 2 times daily For  pain    Diabetic polyneuropathy associated with type 2 diabetes mellitus (H)       hydrocortisone 1%/eucerin 1% compounded cream     30 g    Apply topically 2 times daily    Rash and nonspecific skin eruption       insulin pen needle 30G X 8 MM     100 each    Use as directed with Lantus    Type 2 diabetes mellitus with diabetic nephropathy (H)       labetalol 300 MG tablet    NORMODYNE    120 tablet    Take 0.5 tablets (150 mg) by mouth 2 times daily Take 1/2 tablet (150mg) =300mg. Hold for systolic BP less than 120.    Benign essential hypertension, Kidney replaced by transplant       lactobacillus rhamnosus (GG) capsule     60 capsule    Take 1 capsule by mouth 2 times daily    Diarrhea       LANTUS SOLOSTAR 100 UNIT/ML injection   Generic drug:  insulin glargine     15 mL    Inject 22 Units Subcutaneous daily    Type 2 diabetes mellitus with diabetic polyneuropathy, with long-term current use of insulin (H)       levETIRAcetam 1000 MG Tabs     60 tablet    Take 1,000 mg by mouth 2 times daily    Encephalopathy acute, Status epilepticus (H)       levothyroxine 50 MCG tablet    SYNTHROID/LEVOTHROID    90 tablet    TAKE ONE TABLET BY MOUTH EVERY DAY    Other specified hypothyroidism       lidocaine 2 % topical gel    XYLOCAINE    30 mL    Apply topically as needed for moderate pain 10 ML every 3 weeks    Johnson catheter in place       loratadine 10 MG tablet    CLARITIN    90 tablet    Take 1 tablet (10 mg) by mouth daily    Acute nasopharyngitis       NovoLOG FLEXPEN 100 UNIT/ML injection   Generic drug:  insulin aspart     15 mL    Inject 6-10 units before meals twice daily as directed    Type 2 diabetes mellitus with diabetic polyneuropathy, with long-term current use of insulin (H)       order for DME     3 catheter    Equipment being ordered: Johnson Catheters - 18 french    Johnson catheter status       * order for DME     100 pad    Equipment being ordered: Blue Chux Please send to [facility]  incontinence       * order for DME     1 each    Equipment being ordered: [employer] FX CPAP interface (nasal pillows), size XS.    Obstructive sleep apnea syndrome       * order for DME     12 each    Equipment being ordered: Silver Impregnated catheters HX of frequent UTI    Recurrent UTI       * order for DME     1 Device    High back guerrero wheel chair MICHELLE 99 DX: morbid obesity, generalized weakness, physical deconditioning, chronic vertigo    Morbid obesity, unspecified obesity type (H), Vertigo, Generalized muscle weakness, Physical deconditioning       pravastatin 10 MG tablet    PRAVACHOL    90 tablet    TAKE ONE TABLET BY MOUTH EVERY DAY    Hyperlipidemia LDL goal <100       predniSONE 5 MG tablet    DELTASONE    90 tablet    Take 1 tablet (5 mg) by mouth daily    Kidney replaced by transplant       simethicone 125 MG Chew chewable tablet    MYLICON    120 tablet    Take 1 tablet (125 mg) by mouth as needed for intestinal gas        TACROLIMUS PO      Take 2.5 mg by mouth 2 times daily        VITAMIN D (CHOLECALCIFEROL) PO      Take 4,000 Units by mouth 2 times daily        * Notice:  This list has 4 medication(s) that are the same as other medications prescribed for you. Read the directions carefully, and ask your doctor or other care provider to review them with you.

## 2018-04-25 NOTE — PROGRESS NOTES
SUBJECTIVE/OBJECTIVE:                Gem Jade is a 72 year old female called for a follow-up visit for Medication Therapy Management.  She was referred to me from Marivel Barcenas. Called as a covisit with PCP.  Keshawn also on the call.     Chief Complaint: Follow up from our visit on 4/4/18.    Tobacco: History of tobacco dependence - quit  Alcohol: not currently using    Medication Adherence/Access:  Patient has assistance with medication administration and setting up boxes (HCRN).      Diabetes: Currently taking lantus 22u subQ daily and novolog 10u in AM with waffles and 6u with dinner + 2-8u sliding scale (did not make changes in sliding scale as discussed at last phone visit). Estimates Novolog use 90% of the time.  SMBG: one to two times daily.  Ranges (patient reported):     Date FBG/ 2hours post Lunch/2hours post Dinner /2hours post Bedtime   4/17   199    4/18 227 (10)  79 (6)    4/19 367 (10)  184 (8)    4/20   409 (18)    4/21   198 (8)    4/22   195 (8)    4/23   277 (10)      Patient is not experiencing hypoglycemia  Recent symptoms of high blood sugar? none  Eye exam: due  Foot exam: due  Microalbumin is not < 30 mg/g. Pt is not taking an ACEi/ARB.  Aspirin: Taking 81mg daily and denies side effects  Diet/Exercise: appetite variable but has improved, eating more carbohydrate. Breakfast either has cottage cheese or waffles. When BS is high, she doesn't want to eat anything.  Has been able to get up in her wheelchair twice!    Hypertension: Current medications include labetalol 150mg BID and clonidine 0.1mg BID.  Patient has BP monitored by home care. Reports systolic 140s and diastolic 70s.  Patient reports no current medication side effects.    Current labs include:  Today's Vitals: There were no vitals taken for this visit. - phone visit  BP Readings from Last 3 Encounters:   01/25/18 137/63   01/15/18 156/70   12/25/17 134/51     Lab Results   Component Value Date    A1C 6.3  01/12/2018   .  Lab Results   Component Value Date    CHOL 164 12/05/2016     Lab Results   Component Value Date    TRIG 272 12/05/2016     Lab Results   Component Value Date    HDL 46 12/05/2016     Lab Results   Component Value Date    LDL 64 12/05/2016       Liver Function Studies -   Recent Labs   Lab Test  01/20/18   0720   PROTTOTAL  7.1   ALBUMIN  2.6*   BILITOTAL  0.4   ALKPHOS  106   AST  22   ALT  20       Lab Results   Component Value Date    UCRR 43 11/16/2017    MICROL 1850 04/26/2017    UMALCR 6776.56 (H) 04/26/2017       Last Basic Metabolic Panel:  Lab Results   Component Value Date     04/04/2018      Lab Results   Component Value Date    POTASSIUM 4.7 04/04/2018     Lab Results   Component Value Date    CHLORIDE 106 04/04/2018     Lab Results   Component Value Date    BUN 46 04/04/2018     Lab Results   Component Value Date    CR 1.24 04/04/2018     GFR Estimate   Date Value Ref Range Status   04/04/2018 43 (L) >60 mL/min/1.7m2 Final     Comment:     Non  GFR Calc   03/06/2018 39 (L) >60 mL/min/1.7m2 Final     Comment:     Non  GFR Calc   02/13/2018 34 (L) >60 mL/min/1.7m2 Final     Comment:     Non  GFR Calc     GFR Estimate If Black   Date Value Ref Range Status   04/04/2018 52 (L) >60 mL/min/1.7m2 Final     Comment:      GFR Calc   03/06/2018 47 (L) >60 mL/min/1.7m2 Final     Comment:      GFR Calc   02/13/2018 41 (L) >60 mL/min/1.7m2 Final     Comment:      GFR Calc       Most Recent Immunizations   Administered Date(s) Administered     HepB 01/13/2003     Influenza (H1N1) 11/25/2009     Influenza (High Dose) 3 valent vaccine 10/07/2016     Influenza (IIV3) PF 11/05/2011     Influenza Vaccine IM 3yrs+ 4 Valent IIV4 10/16/2013     Pneumo Conj 13-V (2010&after) 12/01/2015     Pneumococcal 23 valent 06/17/2013     TDAP Vaccine (Adacel) 06/17/2013     Zoster vaccine, live 06/17/2013        ASSESSMENT:              Current medications were reviewed today as discussed above.      Medication Adherence: good, no issues identified    Diabetes: Improved. Patient is meeting A1c goal of < 8%. BS continue to be highly variable likely 2/2 variable carb intake and inconsistent use of Novolog. Encouraged increased BS testing at bedtime to allow for evaluation of Novolog dose.     Hypertension: Stable. Patient is meeting BP goal of < 140/90mmHg.       PLAN:                  Encouraged increased BS monitoring at bedtime    I spent 15 minutes with this patient today. All changes were made via collaborative practice agreement with Marivel Barcenas. A copy of the visit note was provided to the patient's primary care provider.     Will follow up in 1 month.    The patient was sent via [employer] a summary of these recommendations as an after visit summary.    Conchita Toledo, PharmD, BCACP

## 2018-04-27 NOTE — PATIENT INSTRUCTIONS
No med changes today    Keep working on getting up in your chair    I will call you again in 6 weeks

## 2018-05-01 LAB
ANION GAP SERPL CALCULATED.3IONS-SCNC: 10 MMOL/L (ref 3–14)
BUN SERPL-MCNC: 79 MG/DL (ref 7–30)
CALCIUM SERPL-MCNC: 8.4 MG/DL (ref 8.5–10.1)
CHLORIDE SERPL-SCNC: 104 MMOL/L (ref 94–109)
CO2 SERPL-SCNC: 24 MMOL/L (ref 20–32)
CREAT SERPL-MCNC: 1.39 MG/DL (ref 0.52–1.04)
ERYTHROCYTE [DISTWIDTH] IN BLOOD BY AUTOMATED COUNT: 13.6 % (ref 10–15)
GFR SERPL CREATININE-BSD FRML MDRD: 37 ML/MIN/1.7M2
GLUCOSE SERPL-MCNC: 241 MG/DL (ref 70–99)
HCT VFR BLD AUTO: 32.4 % (ref 35–47)
HGB BLD-MCNC: 10.2 G/DL (ref 11.7–15.7)
MCH RBC QN AUTO: 30.1 PG (ref 26.5–33)
MCHC RBC AUTO-ENTMCNC: 31.5 G/DL (ref 31.5–36.5)
MCV RBC AUTO: 96 FL (ref 78–100)
PLATELET # BLD AUTO: 128 10E9/L (ref 150–450)
POTASSIUM SERPL-SCNC: 4.8 MMOL/L (ref 3.4–5.3)
RBC # BLD AUTO: 3.39 10E12/L (ref 3.8–5.2)
SODIUM SERPL-SCNC: 138 MMOL/L (ref 133–144)
WBC # BLD AUTO: 5.8 10E9/L (ref 4–11)

## 2018-05-01 PROCEDURE — 80048 BASIC METABOLIC PNL TOTAL CA: CPT | Performed by: INTERNAL MEDICINE

## 2018-05-01 PROCEDURE — 85027 COMPLETE CBC AUTOMATED: CPT | Performed by: INTERNAL MEDICINE

## 2018-05-01 PROCEDURE — 80197 ASSAY OF TACROLIMUS: CPT | Performed by: INTERNAL MEDICINE

## 2018-05-02 DIAGNOSIS — Z53.9 DIAGNOSIS NOT YET DEFINED: Primary | ICD-10-CM

## 2018-05-02 LAB
TACROLIMUS BLD-MCNC: 4.7 UG/L (ref 5–15)
TME LAST DOSE: ABNORMAL H

## 2018-05-14 ENCOUNTER — TELEPHONE (OUTPATIENT)
Dept: FAMILY MEDICINE | Facility: CLINIC | Age: 73
End: 2018-05-14

## 2018-05-24 DIAGNOSIS — G40.901 STATUS EPILEPTICUS (H): ICD-10-CM

## 2018-05-24 DIAGNOSIS — G93.40 ENCEPHALOPATHY ACUTE: ICD-10-CM

## 2018-05-24 DIAGNOSIS — E78.5 HYPERLIPIDEMIA LDL GOAL <100: ICD-10-CM

## 2018-05-24 NOTE — TELEPHONE ENCOUNTER
"pravastatin (PRAVACHOL) 10 MG tablet  Last Written Prescription Date:  6/7/17   Last Fill Quantity: 90,  # refills: 3   Last office visit: 3/9/2018 with prescribing provider:     Future Office Visit:        levETIRAcetam 1000 MG TABS  Last Written Prescription Date:  3/19/18  Last Fill Quantity: 60,  # refills: 1   Last office visit: 3/9/2018 with prescribing provider:     Future Office Visit:      Requested Prescriptions   Pending Prescriptions Disp Refills     pravastatin (PRAVACHOL) 10 MG tablet 90 tablet 3     Sig: Take 1 tablet (10 mg) by mouth daily    Statins Protocol Failed    5/24/2018  1:54 PM       Failed - LDL on file in past 12 months    Recent Labs   Lab Test  12/05/16   1210   LDL  64            Passed - No abnormal creatine kinase in past 12 months    Recent Labs   Lab Test  03/09/17   1310   CKT  36               Passed - Recent (12 mo) or future (30 days) visit within the authorizing provider's specialty    Patient had office visit in the last 12 months or has a visit in the next 30 days with authorizing provider or within the authorizing provider's specialty.  See \"Patient Info\" tab in inbasket, or \"Choose Columns\" in Meds & Orders section of the refill encounter.           Passed - Patient is age 18 or older       Passed - No active pregnancy on record       Passed - No positive pregnancy test in past 12 months        levETIRAcetam 1000 MG TABS 60 tablet 1     Sig: Take 1,000 mg by mouth 2 times daily    Anti-Seizure Meds Protocol  Failed    5/24/2018  1:54 PM       Failed - Review Authorizing provider's last note.     Refer to last progress notes: confirm request is for original authorizing provider (cannot be through other providers).         Failed - Normal CBC on file in past 26 months    Recent Labs   Lab Test  05/01/18   1220   WBC  5.8   RBC  3.39*   HGB  10.2*   HCT  32.4*   PLT  128*            Failed - Normal serum creatinine on file in past 26 months    Recent Labs   Lab Test  " "05/01/18   1220   CR  1.39*            Failed - Normal platelet count on file in past 26 months    Recent Labs   Lab Test  05/01/18   1220   PLT  128*              Passed - Recent (12 mo) or future (30 days) visit within the authorizing provider's specialty    Patient had office visit in the last 12 months or has a visit in the next 30 days with authorizing provider or within the authorizing provider's specialty.  See \"Patient Info\" tab in inbasket, or \"Choose Columns\" in Meds & Orders section of the refill encounter.           Passed - Normal ALT or AST on file in past 26 months    Recent Labs   Lab Test  01/20/18   0720   ALT  20     Recent Labs   Lab Test  01/20/18   0720   AST  22            Passed - No active pregnancy on record       Passed - No positive pregnancy test in last 12 months          "

## 2018-05-24 NOTE — TELEPHONE ENCOUNTER
Routing refill request to provider for review/approval because:  Labs not current:  LDL, Creatinine, plt's, CBC    Taqueria Araujo RN

## 2018-05-25 ENCOUNTER — TELEPHONE (OUTPATIENT)
Dept: FAMILY MEDICINE | Facility: CLINIC | Age: 73
End: 2018-05-25

## 2018-05-25 ENCOUNTER — APPOINTMENT (OUTPATIENT)
Dept: CT IMAGING | Facility: CLINIC | Age: 73
DRG: 698 | End: 2018-05-25
Attending: EMERGENCY MEDICINE
Payer: MEDICARE

## 2018-05-25 ENCOUNTER — APPOINTMENT (OUTPATIENT)
Dept: GENERAL RADIOLOGY | Facility: CLINIC | Age: 73
DRG: 698 | End: 2018-05-25
Attending: EMERGENCY MEDICINE
Payer: MEDICARE

## 2018-05-25 ENCOUNTER — HOSPITAL ENCOUNTER (INPATIENT)
Facility: CLINIC | Age: 73
LOS: 7 days | Discharge: HOME HEALTH | DRG: 698 | End: 2018-06-01
Attending: EMERGENCY MEDICINE | Admitting: INTERNAL MEDICINE
Payer: MEDICARE

## 2018-05-25 DIAGNOSIS — A41.51 E. COLI SEPSIS (H): ICD-10-CM

## 2018-05-25 DIAGNOSIS — N17.9 ACUTE KIDNEY INJURY (H): ICD-10-CM

## 2018-05-25 DIAGNOSIS — N39.0 URINARY TRACT INFECTION WITHOUT HEMATURIA, SITE UNSPECIFIED: ICD-10-CM

## 2018-05-25 DIAGNOSIS — J96.02 ACUTE RESPIRATORY FAILURE WITH HYPERCAPNIA (H): ICD-10-CM

## 2018-05-25 DIAGNOSIS — G40.909 SEIZURE DISORDER (H): ICD-10-CM

## 2018-05-25 DIAGNOSIS — R41.82 ALTERED MENTAL STATUS, UNSPECIFIED ALTERED MENTAL STATUS TYPE: Primary | ICD-10-CM

## 2018-05-25 DIAGNOSIS — G93.40 ENCEPHALOPATHY ACUTE: ICD-10-CM

## 2018-05-25 DIAGNOSIS — G40.901 STATUS EPILEPTICUS (H): ICD-10-CM

## 2018-05-25 LAB
ALBUMIN SERPL-MCNC: 3 G/DL (ref 3.4–5)
ALBUMIN UR-MCNC: 100 MG/DL
ALP SERPL-CCNC: 88 U/L (ref 40–150)
ALT SERPL W P-5'-P-CCNC: 17 U/L (ref 0–50)
ANION GAP SERPL CALCULATED.3IONS-SCNC: 9 MMOL/L (ref 3–14)
APPEARANCE UR: ABNORMAL
AST SERPL W P-5'-P-CCNC: 8 U/L (ref 0–45)
BACTERIA #/AREA URNS HPF: ABNORMAL /HPF
BASE DEFICIT BLDA-SCNC: 5.1 MMOL/L
BASE DEFICIT BLDV-SCNC: 1.1 MMOL/L
BASOPHILS # BLD AUTO: 0 10E9/L (ref 0–0.2)
BASOPHILS NFR BLD AUTO: 0.3 %
BILIRUB SERPL-MCNC: 0.5 MG/DL (ref 0.2–1.3)
BILIRUB UR QL STRIP: NEGATIVE
BUN SERPL-MCNC: 49 MG/DL (ref 7–30)
CALCIUM SERPL-MCNC: 9.7 MG/DL (ref 8.5–10.1)
CHLORIDE SERPL-SCNC: 100 MMOL/L (ref 94–109)
CO2 SERPL-SCNC: 24 MMOL/L (ref 20–32)
COLOR UR AUTO: YELLOW
CREAT SERPL-MCNC: 1.71 MG/DL (ref 0.52–1.04)
DIFFERENTIAL METHOD BLD: NORMAL
EOSINOPHIL # BLD AUTO: 0.1 10E9/L (ref 0–0.7)
EOSINOPHIL NFR BLD AUTO: 0.9 %
ERYTHROCYTE [DISTWIDTH] IN BLOOD BY AUTOMATED COUNT: 13 % (ref 10–15)
GFR SERPL CREATININE-BSD FRML MDRD: 29 ML/MIN/1.7M2
GLUCOSE BLDC GLUCOMTR-MCNC: 182 MG/DL (ref 70–99)
GLUCOSE BLDC GLUCOMTR-MCNC: 191 MG/DL (ref 70–99)
GLUCOSE BLDC GLUCOMTR-MCNC: 218 MG/DL (ref 70–99)
GLUCOSE BLDC GLUCOMTR-MCNC: 223 MG/DL (ref 70–99)
GLUCOSE BLDC GLUCOMTR-MCNC: 236 MG/DL (ref 70–99)
GLUCOSE SERPL-MCNC: 251 MG/DL (ref 70–99)
GLUCOSE UR STRIP-MCNC: 70 MG/DL
HCO3 BLD-SCNC: 21 MMOL/L (ref 21–28)
HCO3 BLDV-SCNC: 27 MMOL/L (ref 21–28)
HCT VFR BLD AUTO: 39.4 % (ref 35–47)
HGB BLD-MCNC: 12.9 G/DL (ref 11.7–15.7)
HGB UR QL STRIP: ABNORMAL
IMM GRANULOCYTES # BLD: 0 10E9/L (ref 0–0.4)
IMM GRANULOCYTES NFR BLD: 0.2 %
KETONES UR STRIP-MCNC: NEGATIVE MG/DL
LACTATE BLD-SCNC: 1 MMOL/L (ref 0.7–2)
LEUKOCYTE ESTERASE UR QL STRIP: ABNORMAL
LEVETIRACETAM SERPL-MCNC: 95 UG/ML (ref 12–46)
LYMPHOCYTES # BLD AUTO: 1 10E9/L (ref 0.8–5.3)
LYMPHOCYTES NFR BLD AUTO: 10.5 %
MCH RBC QN AUTO: 30.1 PG (ref 26.5–33)
MCHC RBC AUTO-ENTMCNC: 32.7 G/DL (ref 31.5–36.5)
MCV RBC AUTO: 92 FL (ref 78–100)
MONOCYTES # BLD AUTO: 0.8 10E9/L (ref 0–1.3)
MONOCYTES NFR BLD AUTO: 8.2 %
MUCOUS THREADS #/AREA URNS LPF: PRESENT /LPF
NEUTROPHILS # BLD AUTO: 7.3 10E9/L (ref 1.6–8.3)
NEUTROPHILS NFR BLD AUTO: 79.9 %
NITRATE UR QL: POSITIVE
O2/TOTAL GAS SETTING VFR VENT: ABNORMAL %
OXYHGB MFR BLD: 98 % (ref 92–100)
PCO2 BLD: 39 MM HG (ref 35–45)
PCO2 BLDV: 57 MM HG (ref 40–50)
PH BLD: 7.33 PH (ref 7.35–7.45)
PH BLDV: 7.28 PH (ref 7.32–7.43)
PH UR STRIP: 5.5 PH (ref 5–7)
PLATELET # BLD AUTO: 136 10E9/L (ref 150–450)
PLATELET # BLD AUTO: 163 10E9/L (ref 150–450)
PO2 BLD: 145 MM HG (ref 80–105)
PO2 BLDV: 23 MM HG (ref 25–47)
POTASSIUM SERPL-SCNC: 4.8 MMOL/L (ref 3.4–5.3)
PROT SERPL-MCNC: 8.6 G/DL (ref 6.8–8.8)
RBC # BLD AUTO: 4.29 10E12/L (ref 3.8–5.2)
RBC #/AREA URNS AUTO: 100 /HPF (ref 0–2)
SODIUM SERPL-SCNC: 133 MMOL/L (ref 133–144)
SOURCE: ABNORMAL
SP GR UR STRIP: 1.01 (ref 1–1.03)
TACROLIMUS BLD-MCNC: 14.5 UG/L (ref 5–15)
TME LAST DOSE: NORMAL H
TSH SERPL DL<=0.005 MIU/L-ACNC: 0.94 MU/L (ref 0.4–4)
UROBILINOGEN UR STRIP-MCNC: NORMAL MG/DL (ref 0–2)
WBC # BLD AUTO: 9.1 10E9/L (ref 4–11)
WBC #/AREA URNS AUTO: >182 /HPF (ref 0–5)
WBC CLUMPS #/AREA URNS HPF: PRESENT /HPF
YEAST #/AREA URNS HPF: ABNORMAL /HPF

## 2018-05-25 PROCEDURE — 21000000 ZZH R&B IMCU HEART CARE

## 2018-05-25 PROCEDURE — 25000128 H RX IP 250 OP 636: Performed by: NURSE PRACTITIONER

## 2018-05-25 PROCEDURE — 80177 DRUG SCRN QUAN LEVETIRACETAM: CPT | Performed by: INTERNAL MEDICINE

## 2018-05-25 PROCEDURE — 80197 ASSAY OF TACROLIMUS: CPT | Performed by: INTERNAL MEDICINE

## 2018-05-25 PROCEDURE — 87086 URINE CULTURE/COLONY COUNT: CPT | Performed by: EMERGENCY MEDICINE

## 2018-05-25 PROCEDURE — A9270 NON-COVERED ITEM OR SERVICE: HCPCS | Mod: GY | Performed by: INTERNAL MEDICINE

## 2018-05-25 PROCEDURE — 87040 BLOOD CULTURE FOR BACTERIA: CPT | Performed by: EMERGENCY MEDICINE

## 2018-05-25 PROCEDURE — 87077 CULTURE AEROBIC IDENTIFY: CPT | Performed by: EMERGENCY MEDICINE

## 2018-05-25 PROCEDURE — 40000886 ZZH STATISTIC STEP DOWN HRS DAY

## 2018-05-25 PROCEDURE — 25000128 H RX IP 250 OP 636: Performed by: EMERGENCY MEDICINE

## 2018-05-25 PROCEDURE — 87186 SC STD MICRODIL/AGAR DIL: CPT | Performed by: EMERGENCY MEDICINE

## 2018-05-25 PROCEDURE — 25000128 H RX IP 250 OP 636: Performed by: INTERNAL MEDICINE

## 2018-05-25 PROCEDURE — 36600 WITHDRAWAL OF ARTERIAL BLOOD: CPT

## 2018-05-25 PROCEDURE — 99223 1ST HOSP IP/OBS HIGH 75: CPT | Mod: AI | Performed by: INTERNAL MEDICINE

## 2018-05-25 PROCEDURE — 40000281 ZZH STATISTIC TRANSPORT TIME EA 15 MIN

## 2018-05-25 PROCEDURE — 25000131 ZZH RX MED GY IP 250 OP 636 PS 637: Mod: GY | Performed by: INTERNAL MEDICINE

## 2018-05-25 PROCEDURE — 40000884 ZZH STATISTIC STEP DOWN HRS NIGHT

## 2018-05-25 PROCEDURE — 40000141 ZZH STATISTIC PERIPHERAL IV START W/O US GUIDANCE

## 2018-05-25 PROCEDURE — 40000275 ZZH STATISTIC RCP TIME EA 10 MIN

## 2018-05-25 PROCEDURE — 82805 BLOOD GASES W/O2 SATURATION: CPT | Performed by: INTERNAL MEDICINE

## 2018-05-25 PROCEDURE — 87088 URINE BACTERIA CULTURE: CPT | Performed by: EMERGENCY MEDICINE

## 2018-05-25 PROCEDURE — 85049 AUTOMATED PLATELET COUNT: CPT | Performed by: INTERNAL MEDICINE

## 2018-05-25 PROCEDURE — 84443 ASSAY THYROID STIM HORMONE: CPT | Performed by: INTERNAL MEDICINE

## 2018-05-25 PROCEDURE — 25000125 ZZHC RX 250: Performed by: EMERGENCY MEDICINE

## 2018-05-25 PROCEDURE — 36415 COLL VENOUS BLD VENIPUNCTURE: CPT | Performed by: INTERNAL MEDICINE

## 2018-05-25 PROCEDURE — 94660 CPAP INITIATION&MGMT: CPT

## 2018-05-25 PROCEDURE — 25000131 ZZH RX MED GY IP 250 OP 636 PS 637: Mod: GY | Performed by: EMERGENCY MEDICINE

## 2018-05-25 PROCEDURE — 83605 ASSAY OF LACTIC ACID: CPT | Performed by: EMERGENCY MEDICINE

## 2018-05-25 PROCEDURE — 36415 COLL VENOUS BLD VENIPUNCTURE: CPT

## 2018-05-25 PROCEDURE — 96375 TX/PRO/DX INJ NEW DRUG ADDON: CPT

## 2018-05-25 PROCEDURE — 36415 COLL VENOUS BLD VENIPUNCTURE: CPT | Performed by: EMERGENCY MEDICINE

## 2018-05-25 PROCEDURE — 81001 URINALYSIS AUTO W/SCOPE: CPT | Performed by: EMERGENCY MEDICINE

## 2018-05-25 PROCEDURE — 25000132 ZZH RX MED GY IP 250 OP 250 PS 637: Mod: GY | Performed by: INTERNAL MEDICINE

## 2018-05-25 PROCEDURE — 95813 EEG EXTND MNTR 61-119 MIN: CPT

## 2018-05-25 PROCEDURE — 71046 X-RAY EXAM CHEST 2 VIEWS: CPT

## 2018-05-25 PROCEDURE — 87800 DETECT AGNT MULT DNA DIREC: CPT | Performed by: EMERGENCY MEDICINE

## 2018-05-25 PROCEDURE — 85025 COMPLETE CBC W/AUTO DIFF WBC: CPT | Performed by: EMERGENCY MEDICINE

## 2018-05-25 PROCEDURE — 99222 1ST HOSP IP/OBS MODERATE 55: CPT | Performed by: NURSE PRACTITIONER

## 2018-05-25 PROCEDURE — 80053 COMPREHEN METABOLIC PANEL: CPT | Performed by: EMERGENCY MEDICINE

## 2018-05-25 PROCEDURE — 99285 EMERGENCY DEPT VISIT HI MDM: CPT | Mod: 25

## 2018-05-25 PROCEDURE — 82803 BLOOD GASES ANY COMBINATION: CPT | Performed by: EMERGENCY MEDICINE

## 2018-05-25 PROCEDURE — 40000061 ZZH STATISTIC EEG TIME EA 10 MIN

## 2018-05-25 PROCEDURE — 00000146 ZZHCL STATISTIC GLUCOSE BY METER IP

## 2018-05-25 PROCEDURE — 70450 CT HEAD/BRAIN W/O DYE: CPT

## 2018-05-25 PROCEDURE — 96365 THER/PROPH/DIAG IV INF INIT: CPT

## 2018-05-25 PROCEDURE — 96361 HYDRATE IV INFUSION ADD-ON: CPT

## 2018-05-25 RX ORDER — LABETALOL HYDROCHLORIDE 5 MG/ML
10-20 INJECTION, SOLUTION INTRAVENOUS
Status: DISCONTINUED | OUTPATIENT
Start: 2018-05-25 | End: 2018-06-01 | Stop reason: HOSPADM

## 2018-05-25 RX ORDER — POTASSIUM CL/LIDO/0.9 % NACL 10MEQ/0.1L
10 INTRAVENOUS SOLUTION, PIGGYBACK (ML) INTRAVENOUS
Status: DISCONTINUED | OUTPATIENT
Start: 2018-05-25 | End: 2018-05-31

## 2018-05-25 RX ORDER — FAMOTIDINE 10 MG
20 TABLET ORAL 2 TIMES DAILY
Status: DISCONTINUED | OUTPATIENT
Start: 2018-05-25 | End: 2018-05-28

## 2018-05-25 RX ORDER — LORATADINE 10 MG/1
10 TABLET ORAL DAILY
Status: DISCONTINUED | OUTPATIENT
Start: 2018-05-25 | End: 2018-05-28

## 2018-05-25 RX ORDER — POTASSIUM CHLORIDE 29.8 MG/ML
20 INJECTION INTRAVENOUS
Status: DISCONTINUED | OUTPATIENT
Start: 2018-05-25 | End: 2018-05-31

## 2018-05-25 RX ORDER — NALOXONE HYDROCHLORIDE 0.4 MG/ML
.1-.4 INJECTION, SOLUTION INTRAMUSCULAR; INTRAVENOUS; SUBCUTANEOUS
Status: DISCONTINUED | OUTPATIENT
Start: 2018-05-25 | End: 2018-06-01 | Stop reason: HOSPADM

## 2018-05-25 RX ORDER — SODIUM CHLORIDE 9 MG/ML
INJECTION, SOLUTION INTRAVENOUS CONTINUOUS
Status: DISCONTINUED | OUTPATIENT
Start: 2018-05-25 | End: 2018-05-27

## 2018-05-25 RX ORDER — PROCHLORPERAZINE 25 MG
12.5 SUPPOSITORY, RECTAL RECTAL EVERY 12 HOURS PRN
Status: DISCONTINUED | OUTPATIENT
Start: 2018-05-25 | End: 2018-06-01 | Stop reason: HOSPADM

## 2018-05-25 RX ORDER — LEVOTHYROXINE SODIUM 50 UG/1
50 TABLET ORAL DAILY
Status: DISCONTINUED | OUTPATIENT
Start: 2018-05-25 | End: 2018-05-28 | Stop reason: ALTCHOICE

## 2018-05-25 RX ORDER — ONDANSETRON 4 MG/1
4 TABLET, ORALLY DISINTEGRATING ORAL EVERY 6 HOURS PRN
Status: DISCONTINUED | OUTPATIENT
Start: 2018-05-25 | End: 2018-06-01 | Stop reason: HOSPADM

## 2018-05-25 RX ORDER — IPRATROPIUM BROMIDE AND ALBUTEROL SULFATE 2.5; .5 MG/3ML; MG/3ML
3 SOLUTION RESPIRATORY (INHALATION) ONCE
Status: COMPLETED | OUTPATIENT
Start: 2018-05-25 | End: 2018-05-25

## 2018-05-25 RX ORDER — PRAVASTATIN SODIUM 10 MG
10 TABLET ORAL DAILY
Status: DISCONTINUED | OUTPATIENT
Start: 2018-05-25 | End: 2018-05-29

## 2018-05-25 RX ORDER — BISACODYL 10 MG
10 SUPPOSITORY, RECTAL RECTAL DAILY PRN
Status: DISCONTINUED | OUTPATIENT
Start: 2018-05-25 | End: 2018-06-01 | Stop reason: HOSPADM

## 2018-05-25 RX ORDER — METHYLPREDNISOLONE SODIUM SUCCINATE 40 MG/ML
10 INJECTION, POWDER, LYOPHILIZED, FOR SOLUTION INTRAMUSCULAR; INTRAVENOUS EVERY 24 HOURS
Status: DISCONTINUED | OUTPATIENT
Start: 2018-05-25 | End: 2018-05-30

## 2018-05-25 RX ORDER — AMOXICILLIN 250 MG
2 CAPSULE ORAL 2 TIMES DAILY PRN
Status: DISCONTINUED | OUTPATIENT
Start: 2018-05-25 | End: 2018-06-01 | Stop reason: HOSPADM

## 2018-05-25 RX ORDER — BACLOFEN 10 MG/1
10 TABLET ORAL 3 TIMES DAILY PRN
Status: DISCONTINUED | OUTPATIENT
Start: 2018-05-25 | End: 2018-05-28

## 2018-05-25 RX ORDER — PROCHLORPERAZINE MALEATE 5 MG
5 TABLET ORAL EVERY 6 HOURS PRN
Status: DISCONTINUED | OUTPATIENT
Start: 2018-05-25 | End: 2018-06-01 | Stop reason: HOSPADM

## 2018-05-25 RX ORDER — POTASSIUM CHLORIDE 1500 MG/1
20-40 TABLET, EXTENDED RELEASE ORAL
Status: DISCONTINUED | OUTPATIENT
Start: 2018-05-25 | End: 2018-05-31

## 2018-05-25 RX ORDER — MAGNESIUM SULFATE HEPTAHYDRATE 40 MG/ML
4 INJECTION, SOLUTION INTRAVENOUS EVERY 4 HOURS PRN
Status: DISCONTINUED | OUTPATIENT
Start: 2018-05-25 | End: 2018-05-31

## 2018-05-25 RX ORDER — AMOXICILLIN 250 MG
1 CAPSULE ORAL 2 TIMES DAILY PRN
Status: DISCONTINUED | OUTPATIENT
Start: 2018-05-25 | End: 2018-06-01 | Stop reason: HOSPADM

## 2018-05-25 RX ORDER — PREDNISONE 5 MG/1
5 TABLET ORAL DAILY
Status: DISCONTINUED | OUTPATIENT
Start: 2018-05-25 | End: 2018-05-25

## 2018-05-25 RX ORDER — ACETAMINOPHEN 325 MG/1
325-650 TABLET ORAL EVERY 4 HOURS PRN
Status: DISCONTINUED | OUTPATIENT
Start: 2018-05-25 | End: 2018-06-01 | Stop reason: HOSPADM

## 2018-05-25 RX ORDER — LIDOCAINE 40 MG/G
CREAM TOPICAL
Status: DISCONTINUED | OUTPATIENT
Start: 2018-05-25 | End: 2018-06-01 | Stop reason: HOSPADM

## 2018-05-25 RX ORDER — LEVETIRACETAM 500 MG/1
1000 TABLET ORAL 2 TIMES DAILY
Status: DISCONTINUED | OUTPATIENT
Start: 2018-05-25 | End: 2018-05-25

## 2018-05-25 RX ORDER — ASPIRIN 81 MG/1
81 TABLET, CHEWABLE ORAL DAILY
Status: DISCONTINUED | OUTPATIENT
Start: 2018-05-25 | End: 2018-05-29

## 2018-05-25 RX ORDER — HYDRALAZINE HYDROCHLORIDE 20 MG/ML
10-20 INJECTION INTRAMUSCULAR; INTRAVENOUS EVERY 4 HOURS PRN
Status: DISCONTINUED | OUTPATIENT
Start: 2018-05-25 | End: 2018-06-01 | Stop reason: HOSPADM

## 2018-05-25 RX ORDER — CEFEPIME HYDROCHLORIDE 1 G/1
1 INJECTION, POWDER, FOR SOLUTION INTRAMUSCULAR; INTRAVENOUS ONCE
Status: COMPLETED | OUTPATIENT
Start: 2018-05-25 | End: 2018-05-25

## 2018-05-25 RX ORDER — CLONIDINE HYDROCHLORIDE 0.1 MG/1
0.1 TABLET ORAL 2 TIMES DAILY
Status: DISCONTINUED | OUTPATIENT
Start: 2018-05-25 | End: 2018-05-26

## 2018-05-25 RX ORDER — NICOTINE POLACRILEX 4 MG
15-30 LOZENGE BUCCAL
Status: DISCONTINUED | OUTPATIENT
Start: 2018-05-25 | End: 2018-06-01 | Stop reason: HOSPADM

## 2018-05-25 RX ORDER — ONDANSETRON 2 MG/ML
4 INJECTION INTRAMUSCULAR; INTRAVENOUS EVERY 6 HOURS PRN
Status: DISCONTINUED | OUTPATIENT
Start: 2018-05-25 | End: 2018-06-01 | Stop reason: HOSPADM

## 2018-05-25 RX ORDER — POTASSIUM CHLORIDE 1.5 G/1.58G
20-40 POWDER, FOR SOLUTION ORAL
Status: DISCONTINUED | OUTPATIENT
Start: 2018-05-25 | End: 2018-05-31

## 2018-05-25 RX ORDER — POLYETHYLENE GLYCOL 3350 17 G/17G
17 POWDER, FOR SOLUTION ORAL DAILY PRN
Status: DISCONTINUED | OUTPATIENT
Start: 2018-05-25 | End: 2018-06-01 | Stop reason: HOSPADM

## 2018-05-25 RX ORDER — ACETAMINOPHEN 325 MG/1
650 TABLET ORAL EVERY 4 HOURS PRN
Status: DISCONTINUED | OUTPATIENT
Start: 2018-05-25 | End: 2018-05-30

## 2018-05-25 RX ORDER — GABAPENTIN 300 MG/1
600 CAPSULE ORAL 2 TIMES DAILY
Status: DISCONTINUED | OUTPATIENT
Start: 2018-05-25 | End: 2018-05-28

## 2018-05-25 RX ORDER — LABETALOL HYDROCHLORIDE 5 MG/ML
20 INJECTION, SOLUTION INTRAVENOUS ONCE
Status: COMPLETED | OUTPATIENT
Start: 2018-05-25 | End: 2018-05-25

## 2018-05-25 RX ORDER — POTASSIUM CHLORIDE 7.45 MG/ML
10 INJECTION INTRAVENOUS
Status: DISCONTINUED | OUTPATIENT
Start: 2018-05-25 | End: 2018-05-31

## 2018-05-25 RX ORDER — SODIUM CHLORIDE 9 MG/ML
1000 INJECTION, SOLUTION INTRAVENOUS CONTINUOUS
Status: DISCONTINUED | OUTPATIENT
Start: 2018-05-25 | End: 2018-05-27

## 2018-05-25 RX ORDER — HEPARIN SODIUM 5000 [USP'U]/.5ML
5000 INJECTION, SOLUTION INTRAVENOUS; SUBCUTANEOUS EVERY 12 HOURS
Status: DISCONTINUED | OUTPATIENT
Start: 2018-05-25 | End: 2018-06-01 | Stop reason: HOSPADM

## 2018-05-25 RX ORDER — LEVETIRACETAM 10 MG/ML
1000 INJECTION INTRAVASCULAR EVERY 12 HOURS
Status: DISCONTINUED | OUTPATIENT
Start: 2018-05-25 | End: 2018-05-26

## 2018-05-25 RX ORDER — DEXTROSE MONOHYDRATE 25 G/50ML
25-50 INJECTION, SOLUTION INTRAVENOUS
Status: DISCONTINUED | OUTPATIENT
Start: 2018-05-25 | End: 2018-06-01 | Stop reason: HOSPADM

## 2018-05-25 RX ADMIN — SODIUM CHLORIDE: 9 INJECTION, SOLUTION INTRAVENOUS at 06:43

## 2018-05-25 RX ADMIN — INSULIN ASPART 3 UNITS: 100 INJECTION, SOLUTION INTRAVENOUS; SUBCUTANEOUS at 17:30

## 2018-05-25 RX ADMIN — LABETALOL HYDROCHLORIDE 20 MG: 5 INJECTION, SOLUTION INTRAVENOUS at 04:16

## 2018-05-25 RX ADMIN — CEFEPIME HYDROCHLORIDE 2 G: 2 INJECTION, POWDER, FOR SOLUTION INTRAVENOUS at 15:55

## 2018-05-25 RX ADMIN — SODIUM CHLORIDE 1000 ML: 9 INJECTION, SOLUTION INTRAVENOUS at 01:00

## 2018-05-25 RX ADMIN — GABAPENTIN 600 MG: 300 CAPSULE ORAL at 21:06

## 2018-05-25 RX ADMIN — LEVETIRACETAM 1000 MG: 10 INJECTION INTRAVENOUS at 17:12

## 2018-05-25 RX ADMIN — INSULIN GLARGINE 10 UNITS: 100 INJECTION, SOLUTION SUBCUTANEOUS at 21:06

## 2018-05-25 RX ADMIN — FAMOTIDINE 20 MG: 10 TABLET, FILM COATED ORAL at 21:06

## 2018-05-25 RX ADMIN — HEPARIN SODIUM 5000 UNITS: 5000 INJECTION, SOLUTION INTRAVENOUS; SUBCUTANEOUS at 06:30

## 2018-05-25 RX ADMIN — LABETALOL HYDROCHLORIDE 10 MG: 5 INJECTION, SOLUTION INTRAVENOUS at 19:47

## 2018-05-25 RX ADMIN — IPRATROPIUM BROMIDE AND ALBUTEROL SULFATE 3 ML: .5; 3 SOLUTION RESPIRATORY (INHALATION) at 02:21

## 2018-05-25 RX ADMIN — CLONIDINE HYDROCHLORIDE 0.1 MG: 0.1 TABLET ORAL at 21:06

## 2018-05-25 RX ADMIN — CEFEPIME 1 G: 1 INJECTION, POWDER, FOR SOLUTION INTRAMUSCULAR; INTRAVENOUS at 03:25

## 2018-05-25 RX ADMIN — HEPARIN SODIUM 5000 UNITS: 5000 INJECTION, SOLUTION INTRAVENOUS; SUBCUTANEOUS at 17:31

## 2018-05-25 RX ADMIN — METHYLPREDNISOLONE SODIUM SUCCINATE 10 MG: 40 INJECTION, POWDER, FOR SOLUTION INTRAMUSCULAR; INTRAVENOUS at 15:49

## 2018-05-25 RX ADMIN — Medication 150 MG: at 21:06

## 2018-05-25 RX ADMIN — HYDRALAZINE HYDROCHLORIDE 20 MG: 20 INJECTION INTRAMUSCULAR; INTRAVENOUS at 06:32

## 2018-05-25 RX ADMIN — INSULIN ASPART 2 UNITS: 100 INJECTION, SOLUTION INTRAVENOUS; SUBCUTANEOUS at 21:05

## 2018-05-25 RX ADMIN — SODIUM CHLORIDE: 9 INJECTION, SOLUTION INTRAVENOUS at 06:28

## 2018-05-25 NOTE — H&P
History and Physical     Gem Jade MRN# [id number]   YOB: 1945 Age: 72 year old      Date of Admission:  5/25/2018    Primary care provider: Marivel Barcenas          Assessment and Plan:   This is a  72 year old female admitted with altered mental status secondary to a urinary tract infection.    1.  Urinary tract infection.  Patient's been started on cefepime due to history of a Citrobacter infection with multiple antibiotic resistances.  The patient also has allergies to Bactrim and Levaquin.  Urine cultures are pending.  The patient is a chronic indwelling catheter.  Per report, the patient has a very specific catheter.   Further information will need to be obtained from the  prior to exchange.    2.  Acute metabolic encephalopathy.  Likely secondary urinary tract infection.  Hopefully, this will improve with treatment.  Head CT was without acute changes.    3.  Kidney transplant.  The patient is on tacrolimus and prednisone.  Will check tacrolimus level at 8 AM this morning.  Creatinine is slightly elevated at 1.71.  Baseline creatinine appears between 1.24 to 1.39.    4.  Diabetes mellitus type 2.  Place patient on insulin sliding scale every 4 hours.  Hold Levemir until dose and timing confirmed.    5.  Acute on chronic hypoxic and hypercapnic respiratory failure.  VBG demonstrates both hypercapnia and hypoxia.  The patient has been placed on a BiPAP.  Will check ABG at 8 AM this morning.  Likely secondary to altered mental status with underlying obesity hypoventilation syndrome.  Chest x-ray in the emergency department was without acute infiltrate or pulmonary congestion.    6.  Hypothyroidism.  Continue levothyroxine.  Check TSH.    7.  Hypertension.  At home, patient is on clonidine and labetalol.  Will resume once patient is able to take orals.  For now, will write for IV hydralazine and labetalol to be given for systolics greater than 180.    8.  Hyperlipidemia.   Resume pravastatin once able to take orals.    9.  Recurrent encephalopathy.  Possible underlying epilepsy.  Continue Keppra.     # Pain Assessment:  Current Pain Score 5/25/2018   Patient currently in pain? -   Pain score (0-10) 0   Pain location -   Pain descriptors -   CPOT pain score -   - Gem is unable to participate in a collaborative plan for pain management due to altered mental status.   - No current pain complaints.        Attestation:  This patient has been seen and evaluated by me, Richie Carrizales MD.           Chief Complaint:   This patient is a 72 year old female who presents with altered mental status.    History is obtained from the chart due to patient's decreased level of consciousness and inability to contact family members.         History of Present Illness:   This is a 72-year-old female who has had worsening level of awareness over the last several days.  Yesterday patient's speech was noted to be more difficult to understand.  Later, he was unable to wake her up and so he called the ambulance.  Patient was found to have a urinary tract infection in the emergency department.  She was placed on BiPAP due to respiratory acidosis on venous blood gas.   Per report, no obvious illnesses or injuries in the last week.  I have attempted to contact patient's  Chang at the phone #[phone] but there is no answer.  The patient is obtunded and is unable to answer questions appropriately.             Past Medical History:     Past Medical History:   Diagnosis Date     Background diabetic retinopathy(362.01)     extensive diabetic retinopathy, s/p multiple laser treatments     Diabetic gastroparesis (H)     followed by [facility] (Dr. Chen)     Diarrhea      Dizziness and giddiness      Hyponatremia 12/16/11    Na 121     Kidney replaced by transplant      Mixed hyperlipidemia      Obesity      Osteopenia 11/07    per DEXA     Other and unspecified hyperlipidemia      Other pulmonary  embolism and infarction 3/03    Bilateral pulmonary emboli post op after knee replacement     Polyneuropathy in diabetes(357.2)      Primary localized osteoarthrosis, lower leg      Pyelonephritis, unspecified      Type 2 diabetes mellitus with complications (H)      Urinary tract infection, site not specified     Chronic UTIs     UTI (urinary tract infection) due to urinary indwelling catheter (H)              Past Surgical History:     Past Surgical History:   Procedure Laterality Date     C NONSPECIFIC PROCEDURE  10/99    kidney transplant     C NONSPECIFIC PROCEDURE  8/00    MRI head, lumbar spine, pelvis     C TOTAL KNEE ARTHROPLASTY  3/03    Knee Replacement, Total right, post op PE     CHOLECYSTECTOMY       HC LEFT HEART CATHETERIZATION  1999    Cardiac Cath, Left Heart, Negative  (@ [facility])     HC LEFT HEART CATHETERIZATION  4/05    normal coronary angiogram at [facility], had mild troponin rise (0.71)     HC REMV CATARACT EXTRACAP,INSERT LENS  8/04    bilateral cataract repaired, left eye              Social History:     Social History   Substance Use Topics     Smoking status: Former Smoker     Years: 17.00     Quit date: 6/1/1987     Smokeless tobacco: Never Used     Alcohol use No             Family History:     Family History   Problem Relation Age of Onset     DIABETES Mother      Parkinsonism Mother      DIABETES Brother      Hypertension Father      [id number] pneumonia     HEART DISEASE Father              Immunizations:     Immunization History   Administered Date(s) Administered     HepB 01/13/2003     Influenza (H1N1) 11/25/2009     Influenza (High Dose) 3 valent vaccine 12/01/2015, 10/07/2016     Influenza (IIV3) PF 11/30/2000, 11/04/2002, 11/14/2003, 10/22/2004, 11/03/2005, 12/04/2006, 11/12/2007, 10/21/2008, 10/06/2009, 11/09/2010, 11/05/2011     Influenza Vaccine IM 3yrs+ 4 Valent IIV4 10/16/2013     Pneumo Conj 13-V (2010&after) 12/01/2015     Pneumococcal 23 valent 06/17/2013     TDAP Vaccine  (Adacel) 06/17/2013     Zoster vaccine, live 06/17/2013            Allergies:     Allergies   Allergen Reactions     Bactrim Ds [Sulfamethoxazole W/Trimethoprim]      Creatinine level increased     Atorvastatin Calcium      myalgias, muscle aches     Codeine Nausea and Vomiting     Darvocet [Propoxyphene N-Apap] Nausea and Vomiting     Hydromorphone      Levofloxacin Other (See Comments) and Diarrhea     hallucinations     Morphine      Nausea and vomiting     Niaspan [Niacin] GI Disturbance     Flushing even at low dose, GI upset     Percocet [Oxycodone-Acetaminophen]      Vomiting after taking med couple of times     Vicodin [Hydrocodone-Acetaminophen] Nausea and Vomiting             Medications:     Prior to Admission medications    Medication Sig Start Date End Date Taking? Authorizing Provider   ACETAMINOPHEN PO Take 325-650 mg by mouth every 4 hours as needed.    Unknown, Entered By History   aspirin 81 MG chewable tablet Take 1 tablet (81 mg) by mouth daily Starting 1 week after your kidney biopsy 5/4/17   Christiano Webber MD   baclofen (LIORESAL) 10 MG tablet Take 1 tablet (10 mg) by mouth 3 times daily as needed for muscle spasms 1/8/18   Marivel Barcenas APRN CNP   blood glucose monitoring (ACCU-CHEK COMPACT PLUS) test strip Use to test blood sugar 3 times daily or as directed.  Ok to substitute alternative if insurance prefers. 5/25/17      cloNIDine (CATAPRES) 0.1 MG tablet TAKE ONE TABLET BY MOUTH TWICE A DAY 10/2/17   Marivel Barcenas APRN CNP   Cranberry 400 MG TABS Take 400 mg by mouth 2 times daily    Unknown, Entered By History   cyanocobalamin (VITAMIN B12) 1000 MCG/ML injection Inject 1 mL (1,000 mcg) into the muscle every 30 days 4/5/17   Marivel Barcenas APRN CNP   diphenoxylate-atropine (LOMOTIL) 2.5-0.025 MG per tablet After the third watery stool start Lomotil every 4 hours until cleared 3/9/18   Marivel Barcenas APRN CNP   famotidine (PEPCID) 20  MG tablet Take 1 tablet (20 mg) by mouth 2 times daily 3/30/14   Marivel Barcenas APRN CNP   gabapentin (NEURONTIN) 300 MG capsule Take 2 capsules (600 mg) by mouth 2 times daily For pain 1/25/18   Sammi Lai MD   hydrocortisone 1%/eucerin 1% compounded cream Apply topically 2 times daily 2/20/18   Marivel Barcenas APRN CNP   insulin aspart (NOVOLOG FLEXPEN) 100 UNIT/ML injection Inject 6-10 units before meals twice daily as directed 4/5/18      insulin glargine (LANTUS SOLOSTAR) 100 UNIT/ML injection Inject 22 Units Subcutaneous daily 3/8/18      insulin pen needle 30G X 8 MM Use as directed with Lantus 7/14/16   Marivel Barcenas APRN CNP   labetalol (NORMODYNE) 300 MG tablet Take 0.5 tablets (150 mg) by mouth 2 times daily Take 1/2 tablet (150mg) =300mg. Hold for systolic BP less than 120. 4/4/18   Kelsie Perez APRN CNP   Lactobacillus Rhamnosus, GG, (CULTURELL) capsule Take 1 capsule by mouth 2 times daily 5/31/16   She Siddiqui MD   levETIRAcetam 1000 MG TABS Take 1,000 mg by mouth 2 times daily 3/19/18   Marivel Barcenas APRN CNP   levothyroxine (SYNTHROID/LEVOTHROID) 50 MCG tablet TAKE ONE TABLET BY MOUTH EVERY DAY 12/12/17   Marivel Barcenas APRN CNP   lidocaine (XYLOCAINE) 2 % topical gel Apply topically as needed for moderate pain 10 ML every 3 weeks 1/8/18   Marivel Barcenas APRN CNP   loratadine (CLARITIN) 10 MG tablet Take 1 tablet (10 mg) by mouth daily 6/8/17   Marivel Barcenas APRN CNP   order for DME High back guerrero wheel chair  MICHELLE 99  DX: morbid obesity, generalized weakness, physical deconditioning, chronic vertigo 9/14/17   Marivel Barcenas APRN CNP   order for DME Equipment being ordered: Silver Impregnated catheters  HX of frequent UTI 4/5/17   Marivel Barcenas, ANTHONY CNP   order for DME Equipment being ordered: Ahuja FX CPAP interface (nasal pillows), size XS. 9/9/16   Lorenzo Choudhury  MD Richard   order for DME Equipment being ordered: Blue Chux  Please send to [facility] 2/10/16   Marivel Barcenas APRN CNP   ORDER FOR DME Equipment being ordered: Johnson Catheters - 18 french 10/22/13   Marivel Barcensa APRN CNP   pravastatin (PRAVACHOL) 10 MG tablet TAKE ONE TABLET BY MOUTH EVERY DAY 6/7/17   Marivel Barcenas APRN CNP   predniSONE (DELTASONE) 5 MG tablet Take 1 tablet (5 mg) by mouth daily 9/6/17   Marivel Barcenas APRN CNP   simethicone (MYLICON) 125 MG CHEW Take 1 tablet (125 mg) by mouth as needed for intestinal gas 6/6/16      TACROLIMUS PO Take 2.5 mg by mouth 2 times daily    Unknown, Entered By History   VITAMIN D, CHOLECALCIFEROL, PO Take 4,000 Units by mouth 2 times daily    Unknown, Entered By History            Review of Systems:   The 10 point Review of Systems is negative other than noted in the HPI           Physical Exam:   Vitals were reviewed  Temp: 98.3  F (36.8  C) Temp src: Axillary BP: 179/87 Pulse: 71   Resp: 12 SpO2: 100 % O2 Device: BiPAP/CPAP      This is a obese elderly female resting in bed, on BiPAP  Head: atraumatic normocephalic, sclera noninjected and anicterric.  Neck: supple without spinal abnormality  Chest: clear to auscultation bilaterally, without wheezes, rhonchi, or rales.  Cardiovascular: regular rate and rhythm, no murmurs, gallops, or rubs, 3 + BLE edema  Abdomen: bowel sounds normal, nontender and nondistended, no hepatosplenomegaly or masses.  Musculoskeletal: normal muscle mass and tone  Skin: no rashes  Lymph: no lymphadenopathy.  Neuro: cranial nerves II-XII intact, strength in all four extremities normal, reflexes normal, coordination normal.         Data:   All laboratory data reviewed  All cardiac studies reviewed by me.  All imaging studies reviewed by me.

## 2018-05-25 NOTE — TELEPHONE ENCOUNTER
Writer received a physician notification fax. Notifying Klaudia of Missed Home Health Aide.     Delma Flores

## 2018-05-25 NOTE — IP AVS SNAPSHOT
MRN:[id number]                      After Visit Summary   5/25/2018    Gem Jade    MRN: [id number]           Thank you!     Thank you for choosing [facility] for your care. Our goal is always to provide you with excellent care. Hearing back from our patients is one way we can continue to improve our services. Please take a few minutes to complete the written survey that you may receive in the mail after you visit with us. Thank you!        Patient Information     Date Of Birth          1945        Designated Caregiver       Most Recent Value    Caregiver    Will someone help with your care after discharge? yes    Name of designated caregiver Keshawn    Phone number of caregiver [phone]    Caregiver address [place]      About your hospital stay     You were admitted on:  May 25, 2018 You last received care in the:  [place] [address] [facility]    You were discharged on:  June 1, 2018        Reason for your hospital stay       Encephalopathy - due to sepsis and seizure.                  Who to Call     For medical emergencies, please call 911.  For non-urgent questions about your medical care, please call your primary care provider or clinic, [phone]          Attending Provider     Provider Specialty    Elkin Vieira MD Emergency Medicine    [facility], Richie Bunch MD Internal Medicine       Primary Care Provider Office Phone # Fax #    Marivel ANTHONY [facility] [phone]       When to contact your care team       Call your primary doctor if you have any of the following: fever, diarrhea, and if lethargy or confusion noticed by family.                  After Care Instructions     Activity       Your activity upon discharge: activity as tolerated            Diet       Follow this diet upon discharge: Orders Placed This Encounter      Moderate Consistent CHO Diet                  Follow-up Appointments     Follow-up and recommended labs  and tests        Follow up with primary care provider, Marivel Barcenas NP, within 7 days to evaluate medication change and for hospital follow- up.  The following labs/tests are recommended: BMP.                  Your next 10 appointments already scheduled     Jun 06, 2018 11:00 AM CDT   Telephone Visit with ANTHONY Antonio CNP   [facility]    [address] [place] [address]  [address] [place] [address] [id number] [phone]           Note: this is not an onsite visit; there is no need to come to the facility.            Jun 06, 2018  3:30 PM CDT   TELEMEDICINE with Conchita Toledo [facility] ([facility])    [address] [place] [address]  [address] [place] [address] [id number] [phone]           Note: this is not an onsite visit; there is no need to come to the facility.              Additional Services     Home care nursing referral       Resumption of home care services    Your provider has ordered home care nursing services. If you have not been contacted within 2 days of your discharge please call the inpatient department phone number at [phone] .            MED THERAPY MANAGE REFERRAL       Patient has diagnosis of Diabetes, Heart Failure, COPD, or AMI and is on more than 5 medications                  Pending Results     Date and Time Order Name Status Description    6/1/2018 0000 Tacrolimus level In process     5/29/2018 1348 [place] virus RNA by PCR CSF Tube 3 In process     5/29/2018 1348 CSF Culture Aerobic Bacterial Preliminary     5/27/2018 0730 Blood culture Preliminary             Statement of Approval     Ordered          06/01/18 0919  I have reviewed and agree with all the recommendations and orders detailed in this document.  EFFECTIVE NOW     Approved and electronically signed by:  Sharri Lujan MD             Admission  Information     Date & Time Provider Department Dept. Phone    5/25/2018 Richie Carrizales MD [place] [address] [facility] [phone]      Your Vitals Were     Blood Pressure Pulse Temperature Respirations Weight Pulse Oximetry    144/44 (BP Location: Right arm) 76 98.5  F (36.9  C) (Oral) 16 105.3 kg (232 lb 2.3 oz) 99%    BMI (Body Mass Index)                   37.47 kg/m2           MyChart Information     [employer] gives you secure access to your electronic health record. If you see a primary care provider, you can also send messages to your care team and make appointments. If you have questions, please call your primary care clinic.  If you do not have a primary care provider, please call [phone] and they will assist you.        Care EveryWhere ID     This is your Care EveryWhere ID. This could be used by other organizations to access your [facility] medical records  [id number]        Equal Access to Services     CARLOS ENRIQUE GREEN : Marques Goodman, madhav dai, marisela jimenez, jessica aguilar . So [facility] [phone].    ATENCIÓN: Si habla español, tiene a peguero disposición servicios gratuitos de asistencia lingüística. Llame al [phone].    We comply with applicable federal civil rights laws and [place] laws. We do not discriminate on the basis of race, color, national origin, age, disability, sex, sexual orientation, or gender identity.               Review of your medicines      START taking        Dose / Directions    cefuroxime 500 MG tablet   Commonly known as:  CEFTIN   Used for:  E. coli sepsis (H)        Dose:  500 mg   Take 1 tablet (500 mg) by mouth 2 times daily for 9 days   Quantity:  18 tablet   Refills:  0         CONTINUE these medicines which may have CHANGED, or have new prescriptions. If we are uncertain of the size of tablets/capsules you have at home, strength may be listed as something that might have changed.         Dose / Directions    hydrocortisone 1%/eucerin 1% compounded cream   This may have changed:    - when to take this  - reasons to take this   Used for:  Rash and nonspecific skin eruption        Apply topically 2 times daily   Quantity:  30 g   Refills:  1       levETIRAcetam 750 MG tablet   Commonly known as:  KEPPRA   This may have changed:    - medication strength  - how much to take        Dose:  750 mg   Take 1 tablet (750 mg) by mouth 2 times daily   Quantity:  60 tablet   Refills:  11       pravastatin 10 MG tablet   Commonly known as:  PRAVACHOL   This may have changed:  See the new instructions.   Used for:  Hyperlipidemia LDL goal <100        Dose:  10 mg   Take 1 tablet (10 mg) by mouth daily   Quantity:  90 tablet   Refills:  3         CONTINUE these medicines which have NOT CHANGED        Dose / Directions    ACETAMINOPHEN PO        Dose:  325-650 mg   Take 325-650 mg by mouth every 4 hours as needed.   Refills:  0       aspirin 81 MG tablet        Dose:  81 mg   Take 81 mg by mouth daily   Refills:  0       baclofen 10 MG tablet   Commonly known as:  LIORESAL        Dose:  10 mg   Take 1 tablet (10 mg) by mouth 3 times daily as needed for muscle spasms   Quantity:  90 tablet   Refills:  4       CATAPRES PO        Dose:  0.1 mg   Take 0.1 mg by mouth 2 times daily   Refills:  0       Cranberry 400 MG Tabs        Dose:  400 mg   Take 400 mg by mouth 2 times daily   Refills:  0       cyanocobalamin 1000 MCG/ML injection   Commonly known as:  VITAMIN B12   Used for:  Iron deficiency anemia, unspecified iron deficiency anemia type        Dose:  1000 mcg   Inject 1 mL (1,000 mcg) into the muscle every 30 days   Quantity:  3 mL   Refills:  3       famotidine 20 MG tablet   Commonly known as:  PEPCID        Dose:  20 mg   Take 1 tablet (20 mg) by mouth 2 times daily   Quantity:  60 tablet   Refills:  1       gabapentin 300 MG capsule   Commonly known as:  NEURONTIN   Used for:  Diabetic polyneuropathy  associated with type 2 diabetes mellitus (H)        Dose:  600 mg   Take 600 mg by mouth 2 times daily   Quantity:  360 capsule   Refills:  3       labetalol 300 MG tablet   Commonly known as:  NORMODYNE   Used for:  Benign essential hypertension, Kidney replaced by transplant        Dose:  150 mg   Take 0.5 tablets (150 mg) by mouth 2 times daily Take 1/2 tablet (150mg) =300mg. Hold for systolic BP less than 120.   Quantity:  120 tablet   Refills:  3       lactobacillus rhamnosus (GG) capsule   Used for:  Diarrhea        Dose:  1 capsule   Take 1 capsule by mouth 2 times daily   Quantity:  60 capsule   Refills:  11       LANTUS SOLOSTAR 100 UNIT/ML injection   Used for:  Type 2 diabetes mellitus with diabetic polyneuropathy, with long-term current use of insulin (H)   Generic drug:  insulin glargine        Dose:  22 Units   Inject 22 Units Subcutaneous At Bedtime   Quantity:  15 mL   Refills:  11       levothyroxine 50 MCG tablet   Commonly known as:  SYNTHROID/LEVOTHROID   Used for:  Other specified hypothyroidism        TAKE ONE TABLET BY MOUTH EVERY DAY   Quantity:  90 tablet   Refills:  3       lidocaine 2 % topical gel   Commonly known as:  XYLOCAINE   Used for:  Johnson catheter in place        Apply topically as needed for moderate pain 10 ML every 3 weeks   Quantity:  30 mL   Refills:  11       loratadine 10 MG tablet   Commonly known as:  CLARITIN   Used for:  Acute nasopharyngitis        Dose:  10 mg   Take 1 tablet (10 mg) by mouth daily   Quantity:  90 tablet   Refills:  3       NovoLOG FLEXPEN 100 UNIT/ML injection   Used for:  Type 2 diabetes mellitus with diabetic polyneuropathy, with long-term current use of insulin (H)   Generic drug:  insulin aspart        Inject Subcutaneous 3 times daily (with meals) Sliding scale   Quantity:  15 mL   Refills:  11       predniSONE 5 MG tablet   Commonly known as:  DELTASONE   Used for:  Kidney replaced by transplant        Dose:  5 mg   Take 1 tablet (5 mg) by  mouth daily   Quantity:  90 tablet   Refills:  3       simethicone 125 MG Chew chewable tablet   Commonly known as:  MYLICON        Dose:  125 mg   Take 1 tablet (125 mg) by mouth as needed for intestinal gas   Quantity:  120 tablet   Refills:  0       TACROLIMUS PO        Dose:  2.5 mg   Take 2.5 mg by mouth 2 times daily   Refills:  0       VITAMIN D (CHOLECALCIFEROL) PO        Dose:  4000 Units   Take 4,000 Units by mouth 2 times daily   Refills:  0            Where to get your medicines      These medications were sent to [facility] - [facility] [place] [address]   [address] [facility] [address]    Hours:  Mon-Fri 8:30am-5:00pm Toll Free [phone] Phone:  [phone]     pravastatin 10 MG tablet         These medications were sent to [facility] - [place] - [address]  [address] [place] [address] [address]     Phone:  [phone]     cefuroxime 500 MG tablet    levETIRAcetam 750 MG tablet                Protect others around you: Learn how to safely use, store and throw away your medicines at www.disposemymeds.org.        ANTIBIOTIC INSTRUCTION     You've Been Prescribed an Antibiotic - Now What?  Your healthcare team thinks that you or your loved one might have an infection. Some infections can be treated with antibiotics, which are powerful, life-saving drugs. Like all medications, antibiotics have side effects and should only be used when necessary. There are some important things you should know about your antibiotic treatment.      Your healthcare team may run tests before you start taking an antibiotic.    Your team may take samples (e.g., from your blood, urine or other areas) to run tests to look for bacteria. These test can be important to determine if you need an antibiotic at all and, if you do, which antibiotic will work best.      Within a few days, your healthcare team might change or even stop your  antibiotic.    Your team may start you on an antibiotic while they are working to find out what is making you sick.    Your team might change your antibiotic because test results show that a different antibiotic would be better to treat your infection.    In some cases, once your team has more information, they learn that you do not need an antibiotic at all. They may find out that you don't have an infection, or that the antibiotic you're taking won't work against your infection. For example, an infection caused by a virus can't be treated with antibiotics. Staying on an antibiotic when you don't need it is more likely to be harmful than helpful.      You may experience side effects from your antibiotic.    Like all medications, antibiotics have side effects. Some of these can be serious.    Let you healthcare team know if you have any known allergies when you are admitted to the hospital.    One significant side effect of nearly all antibiotics is the risk of severe and sometimes deadly diarrhea caused by Clostridium difficile (C. Difficile). This occurs when a person takes antibiotics because some good germs are destroyed. Antibiotic use allows C. diificile to take over, putting patients at high risk for this serious infection.    As a patient or caregiver, it is important to understand your or your loved one's antibiotic treatment. It is especially important for caregivers to speak up when patients can't speak for themselves. Here are some important questions to ask your healthcare team.    What infection is this antibiotic treating and how do you know I have that infection?    What side effects might occur from this antibiotic?    How long will I need to take this antibiotic?    Is it safe to take this antibiotic with other medications or supplements (e.g., vitamins) that I am taking?     Are there any special directions I need to know about taking this antibiotic? For example, should I take it with  food?    How will I be monitored to know whether my infection is responding to the antibiotic?    What tests may help to make sure the right antibiotic is prescribed for me?      Information provided by:  www.cdc.gov/getsmart  U.S. Department of Health and Human Services  Centers for disease Control and Prevention  National Center for Emerging and Zoonotic Infectious Diseases  Division of Healthcare Quality Promotion             Medication List: This is a list of all your medications and when to take them. Check marks below indicate your daily home schedule. Keep this list as a reference.      Medications           Morning Afternoon Evening Bedtime As Needed    ACETAMINOPHEN PO   Take 325-650 mg by mouth every 4 hours as needed.                                aspirin 81 MG tablet   Take 81 mg by mouth daily                                baclofen 10 MG tablet   Commonly known as:  LIORESAL   Take 1 tablet (10 mg) by mouth 3 times daily as needed for muscle spasms                                CATAPRES PO   Take 0.1 mg by mouth 2 times daily   Last time this was given:  0.1 mg on 6/1/2018  9:01 AM                                cefuroxime 500 MG tablet   Commonly known as:  CEFTIN   Take 1 tablet (500 mg) by mouth 2 times daily for 9 days                                Cranberry 400 MG Tabs   Take 400 mg by mouth 2 times daily                                cyanocobalamin 1000 MCG/ML injection   Commonly known as:  VITAMIN B12   Inject 1 mL (1,000 mcg) into the muscle every 30 days                                famotidine 20 MG tablet   Commonly known as:  PEPCID   Take 1 tablet (20 mg) by mouth 2 times daily   Last time this was given:  20 mg on 5/31/2018  8:01 AM                                gabapentin 300 MG capsule   Commonly known as:  NEURONTIN   Take 600 mg by mouth 2 times daily   Last time this was given:  600 mg on 5/27/2018 10:29 AM                                hydrocortisone 1%/eucerin 1% compounded  cream   Apply topically 2 times daily                                labetalol 300 MG tablet   Commonly known as:  NORMODYNE   Take 0.5 tablets (150 mg) by mouth 2 times daily Take 1/2 tablet (150mg) =300mg. Hold for systolic BP less than 120.   Last time this was given:  150 mg on 6/1/2018  9:00 AM                                lactobacillus rhamnosus (GG) capsule   Take 1 capsule by mouth 2 times daily   Last time this was given:  1 capsule on 5/27/2018 10:28 AM                                LANTUS SOLOSTAR 100 UNIT/ML injection   Inject 22 Units Subcutaneous At Bedtime   Last time this was given:  20 Units on 5/31/2018  9:50 PM   Generic drug:  insulin glargine                                levETIRAcetam 750 MG tablet   Commonly known as:  KEPPRA   Take 1 tablet (750 mg) by mouth 2 times daily   Last time this was given:  750 mg on 6/1/2018  9:00 AM                                levothyroxine 50 MCG tablet   Commonly known as:  SYNTHROID/LEVOTHROID   TAKE ONE TABLET BY MOUTH EVERY DAY   Last time this was given:  50 mcg on 6/1/2018  6:49 AM                                lidocaine 2 % topical gel   Commonly known as:  XYLOCAINE   Apply topically as needed for moderate pain 10 ML every 3 weeks                                loratadine 10 MG tablet   Commonly known as:  CLARITIN   Take 1 tablet (10 mg) by mouth daily   Last time this was given:  10 mg on 5/27/2018 10:30 AM                                NovoLOG FLEXPEN 100 UNIT/ML injection   Inject Subcutaneous 3 times daily (with meals) Sliding scale   Last time this was given:  2 Units on 6/1/2018  8:59 AM   Generic drug:  insulin aspart                                pravastatin 10 MG tablet   Commonly known as:  PRAVACHOL   Take 1 tablet (10 mg) by mouth daily   Last time this was given:  10 mg on 5/27/2018 10:30 AM                                predniSONE 5 MG tablet   Commonly known as:  DELTASONE   Take 1 tablet (5 mg) by mouth daily   Last time this  was given:  5 mg on 6/1/2018  9:01 AM                                simethicone 125 MG Chew chewable tablet   Commonly known as:  MYLICON   Take 1 tablet (125 mg) by mouth as needed for intestinal gas                                TACROLIMUS PO   Take 2.5 mg by mouth 2 times daily                                VITAMIN D (CHOLECALCIFEROL) PO   Take 4,000 Units by mouth 2 times daily

## 2018-05-25 NOTE — PROGRESS NOTES
Patient was placed on BIPAP 14/5 30%, BBS clear diminished and nebs is given as ordered. Patient tolerated well. Alarm set as 10. Will continue to monitor.    Jasmin Avila RT  5/25/2018

## 2018-05-25 NOTE — PROGRESS NOTES
Jeff's test performed prior to radial ABG draw. Collateral circulation confirmed.   ABG was drawn from right radial artery on 30% BIPAP. Will continue to follow.    Leoncio Tran RT

## 2018-05-25 NOTE — PROGRESS NOTES
Admission medication history interview status for the 5/25/2018  admission is complete. See EPIC admission navigator for prior to admission medications     Medication history source reliability:Good    Actions taken by pharmacist (provider contacted, etc): Called pt's spouse at home     Additional medication history information not noted on PTA med list :    Patient's  manages her meds. He was contacted at home to go over her home med list.      did not know when pt's next vit b12 injection is scheduled.    Medication reconciliation/reorder completed by provider prior to medication history? Yes    Time spent in this activity: 20 mins    Prior to Admission medications    Medication Sig Last Dose Taking? Auth Provider   aspirin 81 MG tablet Take 81 mg by mouth daily 5/24/2018 at am Yes Unknown, Entered By History   baclofen (LIORESAL) 10 MG tablet Take 1 tablet (10 mg) by mouth 3 times daily as needed for muscle spasms Past Month at prn Yes Marivel Barcenas APRN CNP   CloNIDine HCl (CATAPRES PO) Take 0.1 mg by mouth 2 times daily 5/24/2018 at pm Yes Unknown, Entered By History   Cranberry 400 MG TABS Take 400 mg by mouth 2 times daily 5/24/2018 at pm Yes Unknown, Entered By History   cyanocobalamin (VITAMIN B12) 1000 MCG/ML injection Inject 1 mL (1,000 mcg) into the muscle every 30 days Past Month at  does not recall when next dose is due.  Yes Marivel Barcenas APRN CNP   famotidine (PEPCID) 20 MG tablet Take 1 tablet (20 mg) by mouth 2 times daily 5/24/2018 at pm Yes Marivel Barcenas APRN CNP   gabapentin (NEURONTIN) 300 MG capsule Take 600 mg by mouth 2 times daily  5/24/2018 at pm Yes Sammi Lai MD   hydrocortisone 1%/eucerin 1% compounded cream Apply topically 2 times daily  Patient taking differently: Apply topically 2 times daily as needed  Past Month at prn Yes Marivel Barcenas APRN CNP   insulin aspart (NOVOLOG FLEXPEN) 100 UNIT/ML injection  Inject Subcutaneous 3 times daily (with meals) Sliding scale 5/24/2018 at pm Yes    insulin glargine (LANTUS SOLOSTAR) 100 UNIT/ML injection Inject 22 Units Subcutaneous At Bedtime  5/24/2018 at pm Yes    labetalol (NORMODYNE) 300 MG tablet Take 0.5 tablets (150 mg) by mouth 2 times daily Take 1/2 tablet (150mg) =300mg. Hold for systolic BP less than 120. 5/24/2018 at pm Yes Kelsie Perez APRN CNP   Lactobacillus Rhamnosus, GG, (CULTURELL) capsule Take 1 capsule by mouth 2 times daily 5/24/2018 at pm Yes She Siddiqui MD   levETIRAcetam 1000 MG TABS Take 1,000 mg by mouth 2 times daily 5/24/2018 at pm Yes Marivel Barcenas APRN CNP   levothyroxine (SYNTHROID/LEVOTHROID) 50 MCG tablet TAKE ONE TABLET BY MOUTH EVERY DAY 5/24/2018 at am Yes Marivel Barcenas APRN CNP   lidocaine (XYLOCAINE) 2 % topical gel Apply topically as needed for moderate pain 10 ML every 3 weeks Past Month at  catheter chnages Yes Marivel Barcenas APRN CNP   loratadine (CLARITIN) 10 MG tablet Take 1 tablet (10 mg) by mouth daily 5/24/2018 at am Yes Marivel Barcenas APRN CNP   pravastatin (PRAVACHOL) 10 MG tablet TAKE ONE TABLET BY MOUTH EVERY DAY 5/24/2018 at pm Yes Marivel Barcenas APRN CNP   predniSONE (DELTASONE) 5 MG tablet Take 1 tablet (5 mg) by mouth daily 5/24/2018 at am Yes Marivel Barcenas APRN CNP   simethicone (MYLICON) 125 MG CHEW Take 1 tablet (125 mg) by mouth as needed for intestinal gas Past Month at prn Yes    TACROLIMUS PO Take 2.5 mg by mouth 2 times daily 5/24/2018 at pm Yes Unknown, Entered By History   VITAMIN D, CHOLECALCIFEROL, PO Take 4,000 Units by mouth 2 times daily 5/24/2018 at pm Yes Unknown, Entered By History   ACETAMINOPHEN PO Take 325-650 mg by mouth every 4 hours as needed. More than a month at prn  Unknown, Entered By History

## 2018-05-25 NOTE — PROGRESS NOTES
[facility]  Patient is currently open to home care services with [facility]. The patient is currently receiving RN/HHA services. Pending sale to [facility]  and team have been notified of patient admission. Pending sale to [facility] liaison will continue to follow patient during stay. If appropriate provide orders to resume home care at time of discharge.

## 2018-05-25 NOTE — PROGRESS NOTES
RECEIVING UNIT ED HANDOFF REVIEW    ED Nurse Handoff Report was reviewed by: Aditi Rucker on May 25, 2018 at 4:22 AM

## 2018-05-25 NOTE — PROGRESS NOTES
SPIRITUAL HEALTH SERVICES Progress Note  [facility]     attempted to visit pt per request but pt was sleeping and undergoing a procedure. SH will follow up later in the weekend.      Cornelia Pyle  Chaplain Resident  Pager: [phone]  Office: [phone]

## 2018-05-25 NOTE — PLAN OF CARE
Problem: Patient Care Overview  Goal: Plan of Care/Patient Progress Review  Outcome: No Change  Pt arrived from ED with no belongings transferred by airmat from cart. Started NS @ 125. Blood sugar checked. BP elevated, gave hydralazine with marked improvement. Pt VSS on bipap. Pt able to nod yes or no, but is primarily only responsive to pain. Contact precautions initiated and maintained. Pt bedridden baseline, has chronic rico. Skin integrity intact. Denies pain. Slept. Report given to oncoming nurse. Continue to monitor.    Addendum: Updated , he reports wife has been bedridden for approximately 7 years after a fall down the stairs, baseline is alert and oriented. Also suffers from vertigo.

## 2018-05-25 NOTE — CONSULTS
[facility]    Nephrology Consultation     Date of Admission:  5/25/2018    Assessment & Plan      Gem Jade is a 72 year old female who was admitted on 5/25/2018.     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    2) LDKT:  Baseline cr of late 1.2-1.4.      3) Proteinuria:  15 g/g !!  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      4) ROB:  Cr up to 1.7.  Likely related to UTI/bacteremia/sepsis.      5) Encephalopathy: Under evaluation.  Perhaps related to sepsis.    Plan:    Change transplant antirejection medicines to IV:  Prednisone 5 mg to methylprednisolone 10 mg to give her a small bump in adrenal steroid.  Tacrolimus 2.5 mg BID to an infusion of 60 mcg/hour to provide about 30% of her oral dose per day.      Next tacro level on Monday.    Chang Marina MD  [facility] - Nephrology  [phone]    Reason for Consult      I was asked to see the patient for kidney transplant.      Primary Care Physician      Marivel Barcenas NP    Chief Complaint      Unable    History is obtained from chart review.      History of Present Illness      Gem Jade is a 72 year old female with ESKD and kidney transplant who presents with UTI and altered mental status.      Her mental status was in decline for 3 days PTA.  Eventually her  was unable to rouse her.  He called 911.    In ED she was found to have an abnormal UA suggesting UTI.  She has also been found to have GNRs on blood culture.    She is on cefepime due to resistance patterns of prior infections.      She has yet to recover her mental status.  She is under evaluation by neurology.    She has ESKD due to T2DM treated with LDKT 10/99.  Her baseline cr has been 1.2-1.4 of late.    She has nephrotic range proteinuria with FSGS seen on recent biopsy with 40% IFTA, GS.    Her immune suppression of late has been prednisone 5 mg daily and tacrolimus 2.5 mg BID.      Past Medical History   I  have reviewed this patient's medical history and updated it with pertinent information if needed.   Past Medical History:   Diagnosis Date     Background diabetic retinopathy(362.01)     extensive diabetic retinopathy, s/p multiple laser treatments     Diabetic gastroparesis (H)     followed by [facility] (Dr. 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  Past Surgical History:   Procedure Laterality Date     C NONSPECIFIC PROCEDURE  10/99    kidney transplant     C NONSPECIFIC PROCEDURE  8/00    MRI head, lumbar spine, pelvis     C TOTAL KNEE ARTHROPLASTY  3/03    Knee Replacement, Total right, post op PE     CHOLECYSTECTOMY       HC LEFT HEART CATHETERIZATION  1999    Cardiac Cath, Left Heart, Negative  (@ [facility])     HC LEFT HEART CATHETERIZATION  4/05    normal coronary angi[facility] at [facility], had mild troponin rise (0.71)     HC REMV CATARACT EXTRACAP,INSERT LENS  8/04    bilateral cataract repaired, left eye        Prior to Admission Medications   Prior to Admission Medications   Prescriptions Last Dose Informant Patient Reported? Taking?   ACETAMINOPHEN PO More than a month at prn Spouse/Significant Other Yes No   Sig: Take 325-650 mg by mouth every 4 hours as needed.   CloNIDine HCl  (CATAPRES PO) 5/24/2018 at pm Spouse/Significant Other Yes Yes   Sig: Take 0.1 mg by mouth 2 times daily   Cranberry 400 MG TABS 5/24/2018 at pm Spouse/Significant Other Yes Yes   Sig: Take 400 mg by mouth 2 times daily   Lactobacillus Rhamnosus, GG, (CULTURELL) capsule 5/24/2018 at pm Spouse/Significant Other No Yes   Sig: Take 1 capsule by mouth 2 times daily   TACROLIMUS PO 5/24/2018 at pm Spouse/Significant Other Yes Yes   Sig: Take 2.5 mg by mouth 2 times daily   VITAMIN D, CHOLECALCIFEROL, PO 5/24/2018 at pm Spouse/Significant Other Yes Yes   Sig: Take 4,000 Units by mouth 2 times daily   aspirin 81 MG tablet 5/24/2018 at am Spouse/Significant Other Yes Yes   Sig: Take 81 mg by mouth daily   baclofen (LIORESAL) 10 MG tablet Past Month at prn Spouse/Significant Other Yes Yes   Sig: Take 1 tablet (10 mg) by mouth 3 times daily as needed for muscle spasms   cyanocobalamin (VITAMIN B12) 1000 MCG/ML injection Past Month at  does not recall when next dose is due.  Spouse/Significant Other No Yes   Sig: Inject 1 mL (1,000 mcg) into the muscle every 30 days   famotidine (PEPCID) 20 MG tablet 5/24/2018 at pm Spouse/Significant Other Yes Yes   Sig: Take 1 tablet (20 mg) by mouth 2 times daily   gabapentin (NEURONTIN) 300 MG capsule 5/24/2018 at pm Spouse/Significant Other Yes Yes   Sig: Take 600 mg by mouth 2 times daily    hydrocortisone 1%/eucerin 1% compounded cream Past Month at prn Spouse/Significant Other No Yes   Sig: Apply topically 2 times daily   Patient taking differently: Apply topically 2 times daily as needed    insulin aspart (NOVOLOG FLEXPEN) 100 UNIT/ML injection 5/24/2018 at pm Spouse/Significant Other Yes Yes   Sig: Inject Subcutaneous 3 times daily (with meals) Sliding scale   insulin glargine (LANTUS SOLOSTAR) 100 UNIT/ML injection 5/24/2018 at pm Spouse/Significant Other Yes Yes   Sig: Inject 22 Units Subcutaneous At Bedtime    labetalol (NORMODYNE) 300 MG tablet 5/24/2018 at pm  Spouse/Significant Other No Yes   Sig: Take 0.5 tablets (150 mg) by mouth 2 times daily Take 1/2 tablet (150mg) =300mg. Hold for systolic BP less than 120.   levETIRAcetam 1000 MG TABS 5/24/2018 at pm Spouse/Significant Other No Yes   Sig: Take 1,000 mg by mouth 2 times daily   levothyroxine (SYNTHROID/LEVOTHROID) 50 MCG tablet 5/24/2018 at am Spouse/Significant Other No Yes   Sig: TAKE ONE TABLET BY MOUTH EVERY DAY   lidocaine (XYLOCAINE) 2 % topical gel Past Month at  catheter chnages Spouse/Significant Other No Yes   Sig: Apply topically as needed for moderate pain 10 ML every 3 weeks   loratadine (CLARITIN) 10 MG tablet 5/24/2018 at am Spouse/Significant Other No Yes   Sig: Take 1 tablet (10 mg) by mouth daily   pravastatin (PRAVACHOL) 10 MG tablet 5/24/2018 at pm Spouse/Significant Other No Yes   Sig: TAKE ONE TABLET BY MOUTH EVERY DAY   predniSONE (DELTASONE) 5 MG tablet 5/24/2018 at am Spouse/Significant Other No Yes   Sig: Take 1 tablet (5 mg) by mouth daily   simethicone (MYLICON) 125 MG CHEW Past Month at prn Spouse/Significant Other Yes Yes   Sig: Take 1 tablet (125 mg) by mouth as needed for intestinal gas      Facility-Administered Medications: None     Allergies   Allergies   Allergen Reactions     Bactrim Ds [Sulfamethoxazole W/Trimethoprim]      Creatinine level increased     Atorvastatin Calcium      myalgias, muscle aches     Codeine Nausea and Vomiting     Darvocet [Propoxyphene N-Apap] Nausea and Vomiting     Hydromorphone      Levofloxacin Other (See Comments) and Diarrhea     hallucinations     Morphine      Nausea and vomiting     Niaspan [Niacin] GI Disturbance     Flushing even at low dose, GI upset     Percocet [Oxycodone-Acetaminophen]      Vomiting after taking med couple of times     Vicodin [Hydrocodone-Acetaminophen] Nausea and Vomiting       Social History   I have reviewed this patient's social history and updated it with pertinent information if needed. Gem Jade  reports that  she quit smoking about 31 years ago. She quit after 17.00 years of use. She has never used smokeless tobacco. She reports that she does not drink alcohol or use illicit drugs.    Family History   I have reviewed this patient's family history and updated it with pertinent information if needed.   Family History   Problem Relation Age of Onset     DIABETES Mother      Parkinsonism Mother      DIABETES Brother      Hypertension Father      [id number] pneumonia     HEART DISEASE Father        Review of Systems   The 10 point Review of Systems is negative other than noted in the HPI.      Physical Exam   Temp: 98.7  F (37.1  C) Temp src: Axillary BP: 143/57 Pulse: 71 Heart Rate: 76 Resp: 13 SpO2: 97 % O2 Device: BiPAP/CPAP    Vital Signs with Ranges  Temp:  [98.3  F (36.8  C)-98.7  F (37.1  C)] 98.7  F (37.1  C)  Pulse:  [71] 71  Heart Rate:  [73-83] 76  Resp:  [9-20] 13  BP: (108-221)/() 143/57  FiO2 (%):  [25 %-30 %] 25 %  SpO2:  [94 %-100 %] 97 %  0 lbs 0 oz    GENERAL: unresponsive, lying in bed.    HEENT:  Normocephalic. No gross abnormalities.  Pupils equal- 3 mm  CV: RRR, no murmurs, no clicks, gallops, or rubs, no edema, no carotid bruits  RESP: Clear bilaterally with good efforts  GI: Abdomen soft/nt/nd, BS normal. No masses, organomegaly  MUSCULOSKELETAL: extremities nl - no gross deformities noted  SKIN: no suspicious lesions or rashes, dry to touch  NEURO:  unresponsive  PSYCH: unable  LYMPH: No palpable ant/post cervical and supraclavicular adenopathy    Data   BMP  Recent Labs  Lab 05/25/18  0126      POTASSIUM 4.8   CHLORIDE 100   ROGER 9.7   CO2 24   BUN 49*   CR 1.71*   *     [email or web address](phos:4)  CBC)  Recent Labs  Lab 05/25/18  0600 05/25/18  0126   WBC  --  9.1   HGB  --  12.9   HCT  --  39.4   MCV  --  92   * 163       Recent Labs  Lab 05/25/18  0126   AST 8   ALT 17   ALKPHOS 88   BILITOTAL 0.5     No lab results found in last 7 days.  25 OH Vit D2   Date Value Ref Range  Status   03/10/2017 <5 ug/L Final     25 OH Vit D3   Date Value Ref Range Status   03/10/2017 43 ug/L Final     25 OH Vit D total   Date Value Ref Range Status   03/10/2017  20 - 75 ug/L Final    <48  Season, race, dietary intake, and treatment affect the concentration of   25-hydroxy-Vitamin D. Values may decrease during winter months and increase   during summer months. Values 20-29 ug/L may indicate Vitamin D insufficiency   and values <20 ug/L may indicate Vitamin D deficiency.   This test was developed and its performance characteristics determined by the   [facility],  [facility]. It has   not been cleared or approved by the FDA. The laboratory is regulated under CLIA   as qualified to perform high-complexity testing. This test is used for clinical   purposes. It should not be regarded as investigational or for research.         Recent Labs  Lab 05/25/18  0126   HGB 12.9   HCT 39.4   MCV 92     No results for input(s): PTHI in the last 168 hours.

## 2018-05-25 NOTE — IP AVS SNAPSHOT
[facility] [address] [facility]    [address] [place] [address]    Phone:  [phone]                                       After Visit Summary   5/25/2018    Gem Jade    MRN: [id number]           After Visit Summary Signature Page     I have received my discharge instructions, and my questions have been answered. I have discussed any challenges I see with this plan with the nurse or doctor.    ..........................................................................................................................................  Patient/Patient Representative Signature      ..........................................................................................................................................  Patient Representative Print Name and Relationship to Patient    ..................................................               ................................................  Date                                            Time    ..........................................................................................................................................  Reviewed by Signature/Title    ...................................................              ..............................................  Date                                                            Time

## 2018-05-25 NOTE — CONSULTS
[facility]    Neurology Consultation Note     Gem Jade MRN# [id number]   YOB: 1945 Age: 72 year old    Code Status:Full Code   Date of Admission: 5/25/2018  Date of Consult: 05/25/2018    _________________________________   Primary Care Physician   Marivel Barcenas, NP      ______________________________________________         Assessment & Plan     Reason for consult: I was asked by Dr. Robert to evaluate this patient for encephalopathy        ______________________________________________  #. (R41.82) Altered mental status, unspecified altered mental status type  --CT head without acute abnormality  --likely multifactorial, with UTI, respiratory acidosis, history of abnormal EEG - improvement in mental status with addition of keppra on previous admission  --recurrent admissions with encephalopathy due to recurrent UTI  #. (G40.449) Seizure disorder (H)  --abnormal EEG in January 2018  --started on keppra at that time with improvement in symptoms  ------not taking PO due to mental status currently - missed dose  ------restart keppra 1000 mg BID IV  --mild right increased tone and few beats clonus - will get EEG to evaluate  #. (N39.0) Urinary tract infection without hematuria, site unspecified  --started on antibiotics  --management per primary service  --will cause encephalopathy, and patient has history of prolonged encephalopathy with infections  #. (J96.02) Acute respiratory failure with hypercapnia (H)  --management per primary service  #. (N17.9) Acute kidney injury (H)  --management per primary service  #. DVT Prophylaxis  --per primary service  #. PT/OT/Speech  --Start evaluations  #. Nutrition / GI Prophylaxis  --Per recommendations of speech therapy      #. Code Status: Full Code      Chief Complaint   ______________________________________________  Altered mental status  History is obtained from the electronic health record    History of Present  Illness   ______________________________________________  Gem Jade is a 72 year old female who presented with altered mental status over the last several days. Day prior to admission speech was noted to be more difficult to understand, later  was unable to wake her and EMS was called. Patient was found to have UTI in the ED. She was placed on BiPAP due to respiratory acidosis. Neurology was consulted due to encephalopathy.    On exam, patient is minimally arousable. Opens eyes to repeated voice command. Does not follow commands in extremities. Mild increased tone RUE, few beats of clonus RLE. Not taking PO currently due to mental status.     Past Medical History    ______________________________________________  Past Medical History:   Diagnosis Date     Background diabetic retinopathy(362.01)     extensive diabetic retinopathy, s/p multiple laser treatments     Diabetic gastroparesis (H)     followed by [facility] (Dr. 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)      Urinary tract infection, site not specified     Chronic UTIs     UTI (urinary tract infection) due to urinary indwelling catheter (H)      Past Surgical History   ______________________________________________  Past Surgical History:   Procedure Laterality Date     C NONSPECIFIC PROCEDURE  10/99    kidney transplant     C NONSPECIFIC PROCEDURE  8/00    MRI head, lumbar spine, pelvis     C TOTAL KNEE ARTHROPLASTY  3/03    Knee Replacement, Total right, post op PE      CHOLECYSTECTOMY       HC LEFT HEART CATHETERIZATION  1999    Cardiac Cath, Left Heart, Negative  (@ [facility])     HC LEFT HEART CATHETERIZATION  4/05    normal coronary angiogram at [facility], had mild troponin rise (0.71)     HC REMV CATARACT EXTRACAP,INSERT LENS  8/04    bilateral cataract repaired, left eye      Prior to Admission Medications   ______________________________________________  Prior to Admission Medications   Prescriptions Last Dose Informant Patient Reported? Taking?   ACETAMINOPHEN PO More than a month at prn Spouse/Significant Other Yes No   Sig: Take 325-650 mg by mouth every 4 hours as needed.   CloNIDine HCl (CATAPRES PO) 5/24/2018 at pm Spouse/Significant Other Yes Yes   Sig: Take 0.1 mg by mouth 2 times daily   Cranberry 400 MG TABS 5/24/2018 at pm Spouse/Significant Other Yes Yes   Sig: Take 400 mg by mouth 2 times daily   Lactobacillus Rhamnosus, GG, (CULTURELL) capsule 5/24/2018 at pm Spouse/Significant Other No Yes   Sig: Take 1 capsule by mouth 2 times daily   TACROLIMUS PO 5/24/2018 at pm Spouse/Significant Other Yes Yes   Sig: Take 2.5 mg by mouth 2 times daily   VITAMIN D, CHOLECALCIFEROL, PO 5/24/2018 at pm Spouse/Significant Other Yes Yes   Sig: Take 4,000 Units by mouth 2 times daily   aspirin 81 MG tablet 5/24/2018 at am Spouse/Significant Other Yes Yes   Sig: Take 81 mg by mouth daily   baclofen (LIORESAL) 10 MG tablet Past Month at prn Spouse/Significant Other Yes Yes   Sig: Take 1 tablet (10 mg) by mouth 3 times daily as needed for muscle spasms   cyanocobalamin (VITAMIN B12) 1000 MCG/ML injection Past Month at  does not recall when next dose is due.  Spouse/Significant Other No Yes   Sig: Inject 1 mL (1,000 mcg) into the muscle every 30 days   famotidine (PEPCID) 20 MG tablet 5/24/2018 at pm Spouse/Significant Other Yes Yes   Sig: Take 1 tablet (20 mg) by mouth 2 times daily   gabapentin (NEURONTIN) 300 MG capsule 5/24/2018 at pm Spouse/Significant Other Yes Yes   Sig: Take  600 mg by mouth 2 times daily    hydrocortisone 1%/eucerin 1% compounded cream Past Month at prn Spouse/Significant Other No Yes   Sig: Apply topically 2 times daily   Patient taking differently: Apply topically 2 times daily as needed    insulin aspart (NOVOLOG FLEXPEN) 100 UNIT/ML injection 5/24/2018 at pm Spouse/Significant Other Yes Yes   Sig: Inject Subcutaneous 3 times daily (with meals) Sliding scale   insulin glargine (LANTUS SOLOSTAR) 100 UNIT/ML injection 5/24/2018 at pm Spouse/Significant Other Yes Yes   Sig: Inject 22 Units Subcutaneous At Bedtime    labetalol (NORMODYNE) 300 MG tablet 5/24/2018 at pm Spouse/Significant Other No Yes   Sig: Take 0.5 tablets (150 mg) by mouth 2 times daily Take 1/2 tablet (150mg) =300mg. Hold for systolic BP less than 120.   levETIRAcetam 1000 MG TABS 5/24/2018 at pm Spouse/Significant Other No Yes   Sig: Take 1,000 mg by mouth 2 times daily   levothyroxine (SYNTHROID/LEVOTHROID) 50 MCG tablet 5/24/2018 at am Spouse/Significant Other No Yes   Sig: TAKE ONE TABLET BY MOUTH EVERY DAY   lidocaine (XYLOCAINE) 2 % topical gel Past Month at  catheter chnages Spouse/Significant Other No Yes   Sig: Apply topically as needed for moderate pain 10 ML every 3 weeks   loratadine (CLARITIN) 10 MG tablet 5/24/2018 at am Spouse/Significant Other No Yes   Sig: Take 1 tablet (10 mg) by mouth daily   pravastatin (PRAVACHOL) 10 MG tablet 5/24/2018 at pm Spouse/Significant Other No Yes   Sig: TAKE ONE TABLET BY MOUTH EVERY DAY   predniSONE (DELTASONE) 5 MG tablet 5/24/2018 at am Spouse/Significant Other No Yes   Sig: Take 1 tablet (5 mg) by mouth daily   simethicone (MYLICON) 125 MG CHEW Past Month at prn Spouse/Significant Other Yes Yes   Sig: Take 1 tablet (125 mg) by mouth as needed for intestinal gas      Facility-Administered Medications: None     Allergies   Allergies   Allergen Reactions     Bactrim Ds [Sulfamethoxazole W/Trimethoprim]      Creatinine level increased     Atorvastatin  Calcium      myalgias, muscle aches     Codeine Nausea and Vomiting     Darvocet [Propoxyphene N-Apap] Nausea and Vomiting     Hydromorphone      Levofloxacin Other (See Comments) and Diarrhea     hallucinations     Morphine      Nausea and vomiting     Niaspan [Niacin] GI Disturbance     Flushing even at low dose, GI upset     Percocet [Oxycodone-Acetaminophen]      Vomiting after taking med couple of times     Vicodin [Hydrocodone-Acetaminophen] Nausea and Vomiting       Social History   ______________________________________________  Social History     Social History     Marital status:      Spouse name: N/A     Number of children: N/A     Years of education: N/A     Social History Main Topics     Smoking status: Former Smoker     Years: 17.00     Quit date: 6/1/1987     Smokeless tobacco: Never Used     Alcohol use No     Drug use: No     Sexual activity: Yes     Partners: Male     Other Topics Concern     None     Social History Narrative       Family History   ______________________________________________  Family History   Problem Relation Age of Onset     DIABETES Mother      Parkinsonism Mother      DIABETES Brother      Hypertension Father      [id number] pneumonia     HEART DISEASE Father        Review of Systems   ______________________________________________  Review of systems is limited by patient factors - mental status      Physical Exam   ______________________________________________  Weight:0 lbs 0 oz; Height:Data Unavailable  Temp: 98.7  F (37.1  C) Temp src: Axillary BP: 119/51 Pulse: 71 Heart Rate: 78 Resp: 13 SpO2: 97 % O2 Device: BiPAP/CPAP    General Appearance:  No acute distress  Neuro:       Mental Status Exam:   Somnolent, opens eyes to voice, minimally arousable, unable to assess orientation. No speech noted, nods/shakes head intermittently to answer questions. Mental status is decreased       Cranial Nerves:  Pupils 3 mm, reactive. EOMI. Face sensation is normal. Face is  symmetric. Tongue and uvula are midline. Other CN are normal           Motor:  Generalized weakness, difficulty following commands. Mild increased tone right UE, few beat clonus right foot         Reflexes:  Normal DTR. Toes equivocal.        Sensory:  Unable to assess             Coordination:   Unable to assess       Gait:  Unable to assess due to weakness  Neck: no nuchal rigidity, normal thyroid. No carotid bruits.    Cardiovascular: Regular rate and rhythm, no m/r/g  Lungs: Clear to auscultation  Abdomen: Soft, not tender, not distended  Extremities: No clubbing, no cyanosis, no edema    Data   ______________________________________________  All Data personally reviewed:       Labs:   CBC RESULTS:     Recent Labs  Lab 05/25/18  0600 05/25/18  0126   WBC  --  9.1   RBC  --  4.29   HGB  --  12.9   HCT  --  39.4   * 163     Basic Metabolic Panel:   Recent Labs   Lab Test  05/25/18   0126  05/01/18   1220  04/04/18   1400   NA  133  138  136   POTASSIUM  4.8  4.8  4.7   CHLORIDE  100  104  106   CO2  24  24  27   BUN  49*  79*  46*   CR  1.71*  1.39*  1.24*   GLC  251*  241*  351*   ROGER  9.7  8.4*  8.9     Liver panel:  Recent Labs   Lab Test  05/25/18   0126  01/20/18   0720  01/20/18   0631  01/19/18   0445  01/18/18   1532   PROTTOTAL  8.6  7.1  Canceled, Test credited  7.1  7.2   ALBUMIN  3.0*  2.6*  Canceled, Test credited  2.6*  2.8*   BILITOTAL  0.5  0.4  Canceled, Test credited  0.4  0.7   ALKPHOS  88  106  Canceled, Test credited  92  86   AST  8  22  Canceled, Test credited  18  13   ALT  17  20  Canceled, Test credited  15  13     INR:  Recent Labs   Lab Test  11/06/17   1652  04/26/17   2338  04/26/17   0638  03/08/17   1243  09/02/16   0838   INR  1.09  1.03  1.06  1.01  1.09      Lipid Profile:  Recent Labs   Lab Test  12/05/16   1210  02/05/15   1250  05/16/13   1240  06/16/11   1230  11/07/10   1250   CHOL  164  106  244*  179  195   HDL  46*  43*  42*  50  57   LDL  64  18  149*  83  93    TRIG  272*  223*  262*  226*  221*   CHOLHDLRATIO   --   2.5  6.0*  3.6  3.4     A1C:   Recent Labs   Lab Test  01/12/18   0648  12/19/17   1602  11/14/17   0455  07/13/17   1105  12/05/16   1210   A1C  6.3*  6.7*  7.7*  5.6  7.4*     Troponin I:   Recent Labs   Lab Test  11/13/17   1613  04/19/17   0005  01/10/16   1408  12/18/15   1315  08/24/15   0030  03/18/14   0043  05/19/13   0625  04/11/11   1455   TROPI  <0.015  <0.015  The 99th percentile for upper reference range is 0.045 ug/L.  Troponin values in   the range of 0.045 - 0.120 ug/L may be associated with risks of adverse   clinical events.    <0.015  The 99th percentile for upper reference range is 0.045 ug/L.  Troponin values in   the range of 0.045 - 0.120 ug/L may be associated with risks of adverse   clinical events.    <0.015  The 99th percentile for upper reference range is 0.045 ug/L.  Troponin values in   the range of 0.045 - 0.120 ug/L may be associated with risks of adverse   clinical events.    <0.015  The 99th percentile for upper reference range is 0.045 ug/L.  Troponin values in   the range of 0.045 - 0.120 ug/L may be associated with risks of adverse   clinical events.    0.012  <0.012  <0.012     Ammonia:   Recent Labs   Lab Test  01/18/18   1903  12/20/17   1420  04/19/17   0005  03/18/14   1138   NOREEN  13 17 25  15     CK:   Recent Labs   Lab Test  03/09/17   1310  08/07/12   0647   CKT  36  <20*        CRP inflammation:   Recent Labs   Lab Test  03/08/17   1243  08/31/16   0801  05/25/16   2116  02/18/16   1436  04/28/15   1650   CRP  7.5  39.0*  <2.9  8.8*  145.0*     ESR:   Recent Labs   Lab Test  05/25/16   2116   SED  53*       Drug Screen: Recent Labs   Lab Test  01/18/18   1738  12/19/17   1710  11/13/17   1846   UAMP   --   Negative  Negative   UBARB   --   Negative  Negative   BENZODIAZEUR  Negative  Negative  Negative   UCANN  Negative  Negative  Negative   UCOC  Negative  Negative  Negative   OPIT  Negative  Negative   Negative   UPCP   --   Negative  Negative     UA Results:  Recent Labs   Lab Test  05/25/18   0155   01/11/18   1614   COLOR  Yellow   < >  Yellow   APPEARANCE  Cloudy   < >  Turbid   URINEGLC  70*   < >  100*   URINEBILI  Negative   < >  Negative   URINEKETONE  Negative   < >  Negative   SG  1.013   < >  1.025   UBLD  Moderate*   < >  Large*   URINEPH  5.5   < >  6.0   PROTEIN  100*   < >  >=300*   UROBILINOGEN   --    --   0.2   NITRITE  Positive*   < >  Positive*   LEUKEST  Large*   < >  Large*   RBCU  100*   < >  5-10*   WBCU  >182*   < >  >100*    < > = values in this interval not displayed.     Most Recent 6 Bacteria Isolates From Any Culture (See EPIC Reports for Culture Details):  Recent Labs   Lab Test  05/25/18   0225  05/25/18   0155  05/25/18   0126  02/09/18   1530  01/18/18   1532  01/18/18   1445   CULT  No growth after 5 hours  PENDING  No growth after 4 hours  >100,000 colonies/mL  Citrobacter freundii complex  *  No growth  >100,000 colonies/mL  Candida glabrata  Susceptibility testing not routinely done  *        Cardiac US:   --       Neurophysiology:   --       Imaging:   All imaging studies were reviewed personally  CT head 5/25/18:   No acute abnormality.        Text Page    Pam Vega, MSN, FNP-BC, RN CNRN

## 2018-05-25 NOTE — ED PROVIDER NOTES
History     Chief Complaint:  Altered Mental Status     HPI   Gem Jade is a 72 year old female with a history of type 2 diabetes, hyperlipidemia and recurrent UTI's who presents to the Emergency Department for evaluation of altered mental status. Per EMS, the patients  called due to decreased mental status over the past two days with mumbled speech, worse tonight. Patient arrives with catheter in place and Per EMS the patient. Here in the ED the patient answers no to all questions. Denies any fevers, shortness of breath or any given pain.    Allergies:  Bactrim  Atorvastatin  Codeine  Levofloxacin  Hydromorphone  Darvocet  Morphine  Niacin  Percocet  Vicodin     Medications:    aspirin 81 MG chewable tablet  baclofen (LIORESAL) 10 MG tablet  blood glucose monitoring (ACCU-CHEK COMPACT PLUS) test strip  clonidine (CATAPRES) 0.1 MG tablet  Cranberry 400 MG TABS  cyanocobalamin (VITAMIN B12) 1000 MCG/ML injection  diphenoxylate-atropine (LOMOTIL) 2.5-0.025 MG per tablet  famotidine (PEPCID) 20 MG tablet  gabapentin (NEURONTIN) 300 MG capsule  hydrocortisone 1%/eucerin 1% compounded cream  insulin aspart (NOVOLOG FLEXPEN) 100 UNIT/ML injection  insulin glargine (LANTUS SOLOSTAR) 100 UNIT/ML injection  insulin pen needle 30G X 8 MM  labetalol (NORMODYNE) 300 MG tablet  Lactobacillus Rhamnosus, GG, (CULTURELL) capsule  levETIRAcetam 1000 MG TABS  levothyroxine (SYNTHROID/LEVOTHROID) 50 MCG tablet  lidocaine (XYLOCAINE) 2 % topical gel  loratadine (CLARITIN) 10 MG tablet  pravastatin (PRAVACHOL) 10 MG tablet  prednisone (DELTASONE) 5 MG tablet  simethicone (MYLICON) 125 MG CHEW  Vitamin D  Acetaminophen  Tacroliumus      Past Medical History:    Atrial fibrillation  Sleep apnea  Acute kidney injury  Sepsis  Epilepsy  Encephalopathy  Pancytopenia  Diabetic retinopathy  Diabetic gastroparesis    Diarrhea  Dizziness and giddiness  Clostridium difficile  colitis   Hyponatremia    Obesity   Osteopenia   Hyperlipidemia   Pulmonary embolism and infarction   Polyneuropathy in diabetes   Osteoarthrosis  Pyelonephritis  Type 2 diabetes  Urinary tract infection    Past Surgical History:    Eye surgery  Coronary angiogram  Cardiac catheterization  Cholecystectomy  Knee replacement  Kidney transplant    Family History:    Diabetes: mother, brother  Hypertension: father  Heart disease: father  Parkinson's disease: mother    Social History:  Smoking Status: former smoker  Alcohol Use: no  Marital Status:   [2]     Review of Systems   Unable to perform ROS: Mental status change   All other systems reviewed and are negative.    Physical Exam   First Vitals:  BP: 149/73  Pulse: 71  Temp: 98.3  F (36.8  C)  Resp: 12  SpO2: 96 %    Physical Exam  Constitutional:  Appears well-developed and well-nourished. Cooperative. Resting with eyes closed. Does not open them to voice or command. Does shake her head yes and no in response to questions.   HENT:   Head:    Atraumatic.   Mouth/Throat:   Oropharynx is without erythema or exudate and mucous     membranes are moist.   Eyes:    Conjunctivae normal and EOM are normal.      Pupils are 3 mm and reactive. No photophobia.   Neck:    Normal range of motion. Neck supple.   Cardiovascular:  Normal rate, regular rhythm, normal heart sounds and radial and    dorsalis pedis pulses are 2+ and symmetric.    Pulmonary/Chest:  Effort sebastian. Diminished breath sounds in both lung bases.  Abdominal:   Soft. Hyperactive bow sounds. Abdomen non tender. Johnson catheter draining cloudy, yellow urine with    sediment.     No splenomegaly or hepatomegaly. No tenderness. No rebound.   Musculoskeletal:   1+ edema in bilateral lower extremities, left more than right. Extension contractures both feet.  Neurological:  Resting with eyes closed. Nods in response to questions, doesn't speak, No facial asymmetry.   Skin:    Skin is warm and dry. does not follow  commands.  Psychiatric:   Flat affect. confused, limited responsiveness    Emergency Department Course     Imaging:  Radiographic findings were communicated with the patient who voiced understanding of the findings.    Chest XR:  No acute abnormality. As per radiology.    CT Head w/o Contrast:  No acute abnormality.   As per radiology.     Laboratory:  CBC: WBC: 9.1, HGB: 12.9, PLT: 163  CMP: Glucose 251(H), BUN 49(H), Creatinine 1.71(H), GFR 29(L), albumin 3.0(L), o/w WNL  Blood gas venous: Ph 7.28(L), CO2 57(H), O2 23(L)  Lactic acid: 1.0    Blood culture: pending  Blood culture: pending    UA with Microscopic: urine Glc 70, moderate blood, protein albumin 100, positive nitrite, large leukocyte esterase, WBC >182(H), (H), WBC clumps present, many bacteria, many yeast, mucous present.   Urine culture aerobic bacterial: pending    Interventions:  0221 Duo neb 3 ml solution, nebulized  Labetalol 20 mg IB  NS 1 L IV  Cefepime 100 mL IV    Emergency Department Course:  Nursing notes and vitals reviewed. I performed an exam of the patient as documented above.     The patient was sent for a chest xray while here in the emergency department, findings above.    Blood drawn. This was sent to the lab for further testing, results above.    The patient provided a urine sample here in the emergency department. This was sent for laboratory testing, findings above.    0235 Spoke with the patients  (Chang) over the phone    The patient was sent for a head CT without contrast while here in the emergency department, findings above.    I spoke with the hospitalist regarding this patient.    I reassessed the patient.     Rechecked the patient, findings and plan explained to the patient, who consents to admission. Discussed the patient with the hospitalist, who will admit the patient to a monitored bed for further observation, evaluation, and treatment.    Impression & Plan      Medical Decision Making:  Gem Jade  is a 72 year old female who presents with decreased level of consciousness. The patients  report she chronically is fatigued and sleeps a lot. He reports over the last five days, after staying up all night to watch the royal wedding the patient has seemed increasingly fatigue. She has been sleeping for much of the day and tonight she was appropriate and interactive until about midnight when she stopped opening her eyes, speaking or answering questions this prompted a 911 call.    When the patient arrived in the ED she does shake her head yes and no in response to questions. She does not open her eyes, however responds to commands she does not really follow commands for strength testing. She denies pain anywhere. EKG done by EMS looked unremarkable. Patient denies chest pain or dyspnea. Oxygen  Saturations are adequate. CBC with differential looks normal as does her comprehensive metabolic panel aside form a bump in her BUN and creatinine. She is a kidney transplant patient and creatinine is currently above her baseline. Serum lactate is normal. BVG shows some mild hypercapnia and hypoxemia and her pH is a little low at 7.28. the patient mccauley shave a history of obstructive sleep apnea. I gave her a nebulizer treatment and put her on Bi Pap to help with CO2 exchange. The patient has a chronic indwelling rico catheter. Urinalysis suggests infection. Urine culture sent and pending. I reviewed her most recent culture and last culture showed freundii that was sensitive to Cefepime and Levaquin. The patient has a Levaquin allergy so she is given a dose of IV Cefepime Blood cultures are obtained and pending.    The patient also is noted to have confusion, altered mental status and an elevated blood pressure. The patients  said she took her oral labetalol prior to coming to the ED. I sent her for a CT scan of the brain which did not show evidence for acute abnormality. No hemorrhage, masses or obvious stroke.  Gave the patient IV labetalol with some improvement in her blood pressure.     I am uncertain as to the cause of the patients altered mental status. It may be a combination of the urinary tract infection and some hypoventilation with CO2 retention. patient does not appear to be septic and she does not have any neurologic deficits aside form confusion. I discussed test results and plan for hospitalization with patients  and he is in agreement. He would appreciate phone call form hospitalist service regarding the patients ongoing care. I attempted to discuss test results with the patient but it is unclear if she is understanding. Of note, she has an intact gag reflex and appears quite comfortable on her Bi pap    Discussed test result wtih Dr. Carrizales of the hospitalist service. He agreed to accept the patient for admission.s he was admitted in fair condition.       Diagnosis:    ICD-10-CM    1. Altered mental status, unspecified altered mental status type R41.82    2. Urinary tract infection without hematuria, site unspecified N39.0    3. Acute respiratory failure with hypercapnia (H) J96.02    4. Acute kidney injury (H) N17.9      Disposition:  Admit      I, Radha Jackson, am serving as a scribe on 5/25/2018 at 12:52 AM to personally document services performed by Elkin Vieira MD based on my observations and the provider's statements to me.     Radha Jackson  5/25/2018    EMERGENCY DEPARTMENT       Elkin Vieira MD  05/25/18 0740

## 2018-05-25 NOTE — ED NOTES
[facility]  ED Nurse Handoff Report    ED Chief complaint: Altered Mental Status (Per EMS  report she has been sleepy since royal wedding. today she decrease in responsivness. hx frequent uti. arrives with catheter in place)      ED Diagnosis:   Final diagnoses:   None       Code Status: Full Code    Allergies:   Allergies   Allergen Reactions     Bactrim Ds [Sulfamethoxazole W/Trimethoprim]      Creatinine level increased     Atorvastatin Calcium      myalgias, muscle aches     Codeine Nausea and Vomiting     Darvocet [Propoxyphene N-Apap] Nausea and Vomiting     Hydromorphone      Levofloxacin Other (See Comments) and Diarrhea     hallucinations     Morphine      Nausea and vomiting     Niaspan [Niacin] GI Disturbance     Flushing even at low dose, GI upset     Percocet [Oxycodone-Acetaminophen]      Vomiting after taking med couple of times     Vicodin [Hydrocodone-Acetaminophen] Nausea and Vomiting       Activity level - Baseline/Home:  Stand with Assist    Activity Level - Current:   Total Care     Needed?: No    Isolation: Yes  Infection: Not Applicable  ESBL  VRE    Bariatric?: Yes    Vital Signs:   Vitals:    05/25/18 0112 05/25/18 0119 05/25/18 0150 05/25/18 0221   BP:  (!) 172/93 193/90    Pulse:       Resp:       Temp:       TempSrc:       SpO2: 94%  100% 100%       Cardiac Rhythm: ,        Pain level: 0-10 Pain Scale: 0    Is this patient confused?: Yes   Suicidality: Screened negative    Patient Report: Initial Complaint: Gem Jade is a 72 year old female with a history of type 2 diabetes, hyperlipidemia and recurrent UTI's who presents to the Emergency Department for evaluation of altered mental status. Per EMS, the patients  called due to decreased mental status over the past two days with mumbled speech, worse tonight. Patient arrives with catheter in place and Per EMS the patient  Focused Assessment: Pt. Is confused and orient. To self, has diff. With  following commands. Awakens to voice, decreased generalized breath sounds. Indwelling cath.   Tests Performed: Labs, U/A, CXR, Head CT.   Abnormal Results:   Results for orders placed or performed during the hospital encounter of 05/25/18 (from the past 24 hour(s))   CBC with platelets differential   Result Value Ref Range    WBC 9.1 4.0 - 11.0 10e9/L    RBC Count 4.29 3.8 - 5.2 10e12/L    Hemoglobin 12.9 11.7 - 15.7 g/dL    Hematocrit 39.4 35.0 - 47.0 %    MCV 92 78 - 100 fl    MCH 30.1 26.5 - 33.0 pg    MCHC 32.7 31.5 - 36.5 g/dL    RDW 13.0 10.0 - 15.0 %    Platelet Count 163 150 - 450 10e9/L    Diff Method Automated Method     % Neutrophils 79.9 %    % Lymphocytes 10.5 %    % Monocytes 8.2 %    % Eosinophils 0.9 %    % Basophils 0.3 %    % Immature Granulocytes 0.2 %    Absolute Neutrophil 7.3 1.6 - 8.3 10e9/L    Absolute Lymphocytes 1.0 0.8 - 5.3 10e9/L    Absolute Monocytes 0.8 0.0 - 1.3 10e9/L    Absolute Eosinophils 0.1 0.0 - 0.7 10e9/L    Absolute Basophils 0.0 0.0 - 0.2 10e9/L    Abs Immature Granulocytes 0.0 0 - 0.4 10e9/L   Comprehensive metabolic panel   Result Value Ref Range    Sodium 133 133 - 144 mmol/L    Potassium 4.8 3.4 - 5.3 mmol/L    Chloride 100 94 - 109 mmol/L    Carbon Dioxide 24 20 - 32 mmol/L    Anion Gap 9 3 - 14 mmol/L    Glucose 251 (H) 70 - 99 mg/dL    Urea Nitrogen 49 (H) 7 - 30 mg/dL    Creatinine 1.71 (H) 0.52 - 1.04 mg/dL    GFR Estimate 29 (L) >60 mL/min/1.7m2    GFR Estimate If Black 36 (L) >60 mL/min/1.7m2    Calcium 9.7 8.5 - 10.1 mg/dL    Bilirubin Total 0.5 0.2 - 1.3 mg/dL    Albumin 3.0 (L) 3.4 - 5.0 g/dL    Protein Total 8.6 6.8 - 8.8 g/dL    Alkaline Phosphatase 88 40 - 150 U/L    ALT 17 0 - 50 U/L    AST 8 0 - 45 U/L   Lactic acid whole blood   Result Value Ref Range    Lactic Acid 1.0 0.7 - 2.0 mmol/L   Blood gas venous   Result Value Ref Range    Ph Venous 7.28 (L) 7.32 - 7.43 pH    PCO2 Venous 57 (H) 40 - 50 mm Hg    PO2 Venous 23 (L) 25 - 47 mm Hg    Bicarbonate  Venous 27 21 - 28 mmol/L    Base Deficit Venous 1.1 mmol/L   XR Chest 2 Views    Narrative    XR CHEST 2 VW  5/25/2018 1:50 AM     HISTORY: AMS, weakness.    COMPARISON: 1/18/2018.    FINDINGS: The heart is enlarged. No pulmonary edema. The thoracic  aorta is calcified. The lungs are clear. No pneumothorax or pleural  effusion.      Impression    IMPRESSION: No acute abnormality.   UA with Microscopic   Result Value Ref Range    Color Urine Yellow     Appearance Urine Cloudy     Glucose Urine 70 (A) NEG^Negative mg/dL    Bilirubin Urine Negative NEG^Negative    Ketones Urine Negative NEG^Negative mg/dL    Specific Gravity Urine 1.013 1.003 - 1.035    Blood Urine Moderate (A) NEG^Negative    pH Urine 5.5 5.0 - 7.0 pH    Protein Albumin Urine 100 (A) NEG^Negative mg/dL    Urobilinogen mg/dL Normal 0.0 - 2.0 mg/dL    Nitrite Urine Positive (A) NEG^Negative    Leukocyte Esterase Urine Large (A) NEG^Negative    Source Midstream Urine     WBC Urine >182 (H) 0 - 5 /HPF    RBC Urine 100 (H) 0 - 2 /HPF    WBC Clumps Present (A) NEG^Negative /HPF    Bacteria Urine Many (A) NEG^Negative /HPF    Yeast Urine Many (A) NEG^Negative /HPF    Mucous Urine Present (A) NEG^Negative /LPF     *Note: Due to a large number of results and/or encounters for the requested time period, some results have not been displayed. A complete set of results can be found in Results Review.       Treatments provided: Bolus of NaCl 1000cc, Bipap, Maxipine IVPB one gram.     Family Comments: None present    OBS brochure/video discussed/provided to patient: N/A    ED Medications:   Medications   0.9% sodium chloride BOLUS (not administered)     Followed by   sodium chloride 0.9% infusion (not administered)   ceFEPIme (MAXIPIME) 1g vial to attach to  ml bag for ADULTS or NS 50 ml bag for PEDS (not administered)   ipratropium - albuterol 0.5 mg/2.5 mg/3 mL (DUONEB) neb solution 3 mL (3 mLs Nebulization Given 5/25/18 2259)       Drips infusing?:   Yes    For the majority of the shift this patient was Green.   Interventions performed were None.    Severe Sepsis OR Septic Shock Diagnosis Present: No      ED NURSE PHONE NUMBER: [phone]

## 2018-05-25 NOTE — LETTER
Transition Communication Hand-off for Care Transitions to Next Level of Care Provider    Name: Gem Jade  : 1945  MRN #: [id number]  Primary Care Provider: Marivel Barcenas NP     Primary Clinic: [address] [place], [place] [address]E [facility] [address]  [facility] [address]     Reason for Hospitalization:  Acute kidney injury (H) [N17.9]  Acute respiratory failure with hypercapnia (H) [J96.02]  Urinary tract infection without hematuria, site unspecified [N39.0]  Altered mental status, unspecified altered mental status type [R41.82]  Admit Date/Time: 2018 12:54 AM  Discharge Date: 18  Payor Source: Payor: MEDICA / Plan: MEDICA PRIME SOLUTION / Product Type: Indemnity /     Readmission Assessment Measure (CHETAN) Risk Score/category: High         Reason for Communication Hand-off Referral: Fragility  Multiple providers/specialties  Avoidable readmission within 30 days    Discharge Plan: Home with spouse and resumption of HHC       Concern for non-adherence with plan of care:   Y/N no    Already enrolled in Tele-monitoring program and name of program:  Yes, MTM    Follow-up plan:  Future Appointments  Date Time Provider Department Center   2018 11:00 AM Marivel Barcenas, [facility]   2018 3:30 PM Conchita Toledo, [facility]       Any outstanding tests or procedures:        Referrals     Future Labs/Procedures    Home care nursing referral     Comments:    Resumption of home care services    Your provider has ordered home care nursing services. If you have not been contacted within 2 days of your discharge please call the inpatient department phone number at [phone] .    MED THERAPY MANAGE REFERRAL     Comments:    Patient has diagnosis of Diabetes, Heart Failure, COPD, or AMI and is on more than 5 medications            Key Recommendations:  Pt was admitted with sepsis due to catheter related UTI and acute encephalopathy.   She has returned to normal mental status and discharged  to home with her  and resumption of HHC.  She will be on an oral antibiotic to complete 2 week course. Her  provides care and they have a hospital bed and 2 christiana lifts in the home.    Bailey Mares RN Care Coordinator    AVS/Discharge Summary is the source of truth; this is a helpful guide for improved communication of patient story

## 2018-05-25 NOTE — PROGRESS NOTES
[facility]    Hospitalist Progress Note    Assessment & Plan    72 year old female with multiple chronic medical problems admitted with aggressive change in mental state for the last 72 hours or so.  She has had a history of recurrent admissions for encephalopathy.     Urinary tract infection.    -Per the  patient with worsening encephalopathic in the last 3 days  -UA consistent with UTI  -Continue cefepime based on her previous urine cultures     Acute metabolic encephalopathy.    -Likely multifactorial     -Initial head CT-negative for acute findings  -TSH within normal limits, no evidence of hypoglycemia  -Patient has had problems with recurrent encephalopathies  -Reportedly per home RN, patient is very sleepy during the early part of the day and perks up as the day wears on  -During the last discharge patient was empirically placed on Keppra-no documented seizures  -We will check a Keppra level  -If patient does not clear by later this afternoon, neurology consult    Acute on chronic hypoxic and hypercapnic respiratory failure.    -VBG demonstrates both hypercapnia and hypoxia.    -The patient has been placed on a BiPAP.    -ABG this morning shows improved gases however patient continues to be encephalopathic, as described above will await how she does later this afternoon, continue BiPAP for now  -Chest x-ray in the emergency department was without acute infiltrate or pulmonary congestion.    Acute on chronic kidney disease-III  History of kidney transplant.    -The patient is on tacrolimus and prednisone.    -tacrolimus level pending   -Creatinine is slightly elevated at 1.71 from a baseline of 1.24 to 1.39.  -Continue IV fluids  -Nephrology consult; patient unable to take oral transplants medication at this time, will discuss with nephrology.     Diabetes mellitus type 2.    -High resistance sliding-scale insulin  -Resume prior to admission Lantus at a reduced dose of 10 units at  bedtime      Hypothyroidism.    -Continue levothyroxine.    -TSH within normal limits     Benign essential hypertension.   -PTA on clonidine and labetalol.    -Will resume once patient is able to take orals.    -IV hydralazine and labetalol as needed to be given for systolics greater than 180.     Hyperlipidemia.    -Resume pravastatin once able to take orals.       # Pain Assessment:  Current Pain Score 5/25/2018   Patient currently in pain? sleeping: patient not able to self report   Pain score (0-10) -   Pain location -   Pain descriptors -   CPOT pain score -   - Gem is unable to participate in a collaborative plan for pain management due to acute encephalopathy.         D/W: RN  DVT Prophylaxis: Pneumatic Compression Devices  Code Status: Full Code    Disposition: Expected discharge 2+ days    Catrachito Robert MD    Interval History   Continues to be encephalopathic, blood gases much improved.  Reportedly per home RN, patient tends to be very sleepy during the first part of the day.    -Data reviewed today: I reviewed all new labs and imaging results over the last 24 hours. I personally reviewed no images or EKG's today.      Physical Exam   Temp: 98.7  F (37.1  C) Temp src: Axillary BP: 127/57 Pulse: 71 Heart Rate: 77 Resp: 14 SpO2: 100 % O2 Device: BiPAP/CPAP    There were no vitals filed for this visit.  Vital Signs with Ranges  Temp:  [98.3  F (36.8  C)-98.7  F (37.1  C)] 98.7  F (37.1  C)  Pulse:  [71] 71  Heart Rate:  [73-77] 77  Resp:  [9-20] 14  BP: (108-221)/() 127/57  FiO2 (%):  [30 %] 30 %  SpO2:  [94 %-100 %] 100 %  I/O last 3 completed shifts:  In: -   Out: 725 [Urine:725]    Constitutional: Encephalopathic, withdraws to painful stimuli, not following commands  HEENT: Moist oral mucosa  Neck- Supple, Good ROM, No JVD  Respiratory:  No crackles, No wheezes, CTA B/L, Normal WOB  Cardiovascular: RRR, No murmur  GI: Soft, Non- tender, BS- normoactive, No  Guarding/rebound/rigidity  Skin/Integument: Warm and dry, no rashes  MSK: No joint deformity or swelling, no edema  Neuro: Unable to accurately assess motor strength      Medications       - MEDICATION INSTRUCTIONS -       sodium chloride 1,000 mL (05/25/18 0825)     sodium chloride 125 mL/hr at 05/25/18 0643         aspirin  81 mg Oral Daily     ceFEPIme (MAXIPIME) IV  2 g Intravenous Q12H     cloNIDine  0.1 mg Oral BID     famotidine  20 mg Oral BID     gabapentin  600 mg Oral BID     heparin  5,000 Units Subcutaneous Q12H     insulin aspart  1-12 Units Subcutaneous Q4H     labetalol  150 mg Oral BID     levETIRAcetam  1,000 mg Oral BID     levothyroxine  50 mcg Oral Daily     loratadine  10 mg Oral Daily     pravastatin  10 mg Oral Daily     predniSONE  5 mg Oral Daily     sodium chloride (PF)  3 mL Intracatheter Q8H     tacrolimus  2.5 mg Oral BID IS       Data       Recent Labs  Lab 05/25/18  0126   WBC 9.1   HGB 12.9   MCV 92         POTASSIUM 4.8   CHLORIDE 100   CO2 24   BUN 49*   CR 1.71*   ANIONGAP 9   ROGER 9.7   *   ALBUMIN 3.0*   PROTTOTAL 8.6   BILITOTAL 0.5   ALKPHOS 88   ALT 17   AST 8       Recent Results (from the past 24 hour(s))   XR Chest 2 Views    Narrative    XR CHEST 2 VW  5/25/2018 1:50 AM     HISTORY: AMS, weakness.    COMPARISON: 1/18/2018.    FINDINGS: The heart is enlarged. No pulmonary edema. The thoracic  aorta is calcified. The lungs are clear. No pneumothorax or pleural  effusion.      Impression    IMPRESSION: No acute abnormality.    STEFFANY KAUR MD   Head CT w/o contrast    Narrative    CT SCAN OF THE HEAD WITHOUT CONTRAST   5/25/2018 3:50 AM     HISTORY: Altered mental status. Hypertension.    TECHNIQUE:  Axial images of the head and coronal reformations without  IV contrast material. Radiation dose for this scan was reduced using  automated exposure control, adjustment of the mA and/or kV according  to patient size, or iterative reconstruction  technique.    COMPARISON: 1/18/2018.    FINDINGS:  There is generalized atrophy of the brain.  There is low  attenuation in the white matter of the cerebral hemispheres consistent  with sequelae of small vessel ischemic disease. There is no evidence  of intracranial hemorrhage, mass, acute infarct or anomaly.     The visualized portions of the sinuses and mastoids appear normal.  There is no evidence of trauma.      Impression    IMPRESSION: No acute abnormality.      STEFFANY KAUR MD

## 2018-05-26 LAB
ANION GAP SERPL CALCULATED.3IONS-SCNC: 10 MMOL/L (ref 3–14)
BUN SERPL-MCNC: 49 MG/DL (ref 7–30)
CALCIUM SERPL-MCNC: 8.7 MG/DL (ref 8.5–10.1)
CHLORIDE SERPL-SCNC: 112 MMOL/L (ref 94–109)
CO2 SERPL-SCNC: 19 MMOL/L (ref 20–32)
CREAT SERPL-MCNC: 1.5 MG/DL (ref 0.52–1.04)
ERYTHROCYTE [DISTWIDTH] IN BLOOD BY AUTOMATED COUNT: 13.1 % (ref 10–15)
GFR SERPL CREATININE-BSD FRML MDRD: 34 ML/MIN/1.7M2
GLUCOSE BLDC GLUCOMTR-MCNC: 132 MG/DL (ref 70–99)
GLUCOSE BLDC GLUCOMTR-MCNC: 150 MG/DL (ref 70–99)
GLUCOSE BLDC GLUCOMTR-MCNC: 177 MG/DL (ref 70–99)
GLUCOSE BLDC GLUCOMTR-MCNC: 177 MG/DL (ref 70–99)
GLUCOSE BLDC GLUCOMTR-MCNC: 192 MG/DL (ref 70–99)
GLUCOSE BLDC GLUCOMTR-MCNC: 214 MG/DL (ref 70–99)
GLUCOSE SERPL-MCNC: 195 MG/DL (ref 70–99)
HBA1C MFR BLD: 8.8 % (ref 0–5.6)
HCT VFR BLD AUTO: 36.9 % (ref 35–47)
HGB BLD-MCNC: 12 G/DL (ref 11.7–15.7)
LACTATE BLD-SCNC: 1 MMOL/L (ref 0.7–2)
MCH RBC QN AUTO: 30.1 PG (ref 26.5–33)
MCHC RBC AUTO-ENTMCNC: 32.5 G/DL (ref 31.5–36.5)
MCV RBC AUTO: 93 FL (ref 78–100)
PLATELET # BLD AUTO: 132 10E9/L (ref 150–450)
POTASSIUM SERPL-SCNC: 5 MMOL/L (ref 3.4–5.3)
RBC # BLD AUTO: 3.99 10E12/L (ref 3.8–5.2)
SODIUM SERPL-SCNC: 141 MMOL/L (ref 133–144)
WBC # BLD AUTO: 6.8 10E9/L (ref 4–11)

## 2018-05-26 PROCEDURE — 25000132 ZZH RX MED GY IP 250 OP 250 PS 637: Mod: GY | Performed by: INTERNAL MEDICINE

## 2018-05-26 PROCEDURE — 80197 ASSAY OF TACROLIMUS: CPT | Performed by: INTERNAL MEDICINE

## 2018-05-26 PROCEDURE — 25000128 H RX IP 250 OP 636: Performed by: INTERNAL MEDICINE

## 2018-05-26 PROCEDURE — 83605 ASSAY OF LACTIC ACID: CPT | Performed by: INTERNAL MEDICINE

## 2018-05-26 PROCEDURE — 36415 COLL VENOUS BLD VENIPUNCTURE: CPT | Performed by: INTERNAL MEDICINE

## 2018-05-26 PROCEDURE — 21000000 ZZH R&B IMCU HEART CARE

## 2018-05-26 PROCEDURE — A9270 NON-COVERED ITEM OR SERVICE: HCPCS | Mod: GY | Performed by: INTERNAL MEDICINE

## 2018-05-26 PROCEDURE — 83036 HEMOGLOBIN GLYCOSYLATED A1C: CPT | Performed by: INTERNAL MEDICINE

## 2018-05-26 PROCEDURE — 85027 COMPLETE CBC AUTOMATED: CPT | Performed by: INTERNAL MEDICINE

## 2018-05-26 PROCEDURE — 25000131 ZZH RX MED GY IP 250 OP 636 PS 637: Mod: GY | Performed by: INTERNAL MEDICINE

## 2018-05-26 PROCEDURE — 40000275 ZZH STATISTIC RCP TIME EA 10 MIN

## 2018-05-26 PROCEDURE — 80048 BASIC METABOLIC PNL TOTAL CA: CPT | Performed by: INTERNAL MEDICINE

## 2018-05-26 PROCEDURE — 00000146 ZZHCL STATISTIC GLUCOSE BY METER IP

## 2018-05-26 PROCEDURE — 40000886 ZZH STATISTIC STEP DOWN HRS DAY

## 2018-05-26 PROCEDURE — 99233 SBSQ HOSP IP/OBS HIGH 50: CPT | Performed by: INTERNAL MEDICINE

## 2018-05-26 PROCEDURE — 25000128 H RX IP 250 OP 636: Performed by: NURSE PRACTITIONER

## 2018-05-26 PROCEDURE — 94660 CPAP INITIATION&MGMT: CPT

## 2018-05-26 RX ORDER — CLONIDINE HYDROCHLORIDE 0.1 MG/1
0.1 TABLET ORAL 2 TIMES DAILY
Status: DISCONTINUED | OUTPATIENT
Start: 2018-05-26 | End: 2018-05-28

## 2018-05-26 RX ORDER — LACTOBACILLUS RHAMNOSUS GG 10B CELL
1 CAPSULE ORAL 2 TIMES DAILY
Status: DISCONTINUED | OUTPATIENT
Start: 2018-05-26 | End: 2018-05-29

## 2018-05-26 RX ORDER — LEVETIRACETAM 500 MG/1
500 TABLET ORAL 2 TIMES DAILY
Status: DISCONTINUED | OUTPATIENT
Start: 2018-05-26 | End: 2018-05-28

## 2018-05-26 RX ADMIN — GABAPENTIN 600 MG: 300 CAPSULE ORAL at 20:42

## 2018-05-26 RX ADMIN — SODIUM CHLORIDE: 9 INJECTION, SOLUTION INTRAVENOUS at 00:12

## 2018-05-26 RX ADMIN — LEVETIRACETAM 500 MG: 500 TABLET, FILM COATED ORAL at 20:42

## 2018-05-26 RX ADMIN — HYDRALAZINE HYDROCHLORIDE 20 MG: 20 INJECTION INTRAMUSCULAR; INTRAVENOUS at 22:06

## 2018-05-26 RX ADMIN — HEPARIN SODIUM 5000 UNITS: 5000 INJECTION, SOLUTION INTRAVENOUS; SUBCUTANEOUS at 20:42

## 2018-05-26 RX ADMIN — ONDANSETRON 4 MG: 2 INJECTION INTRAMUSCULAR; INTRAVENOUS at 20:38

## 2018-05-26 RX ADMIN — TACROLIMUS 60 MCG/HR: 5 INJECTION, SOLUTION INTRAVENOUS at 00:11

## 2018-05-26 RX ADMIN — LEVOTHYROXINE SODIUM 50 MCG: 50 TABLET ORAL at 10:19

## 2018-05-26 RX ADMIN — INSULIN ASPART 3 UNITS: 100 INJECTION, SOLUTION INTRAVENOUS; SUBCUTANEOUS at 22:04

## 2018-05-26 RX ADMIN — LEVETIRACETAM 1000 MG: 10 INJECTION INTRAVENOUS at 04:13

## 2018-05-26 RX ADMIN — Medication 1 CAPSULE: at 15:55

## 2018-05-26 RX ADMIN — FAMOTIDINE 20 MG: 10 TABLET, FILM COATED ORAL at 10:20

## 2018-05-26 RX ADMIN — Medication 150 MG: at 20:36

## 2018-05-26 RX ADMIN — INSULIN ASPART 3 UNITS: 100 INJECTION, SOLUTION INTRAVENOUS; SUBCUTANEOUS at 01:10

## 2018-05-26 RX ADMIN — CEFEPIME HYDROCHLORIDE 2 G: 2 INJECTION, POWDER, FOR SOLUTION INTRAVENOUS at 17:24

## 2018-05-26 RX ADMIN — FAMOTIDINE 20 MG: 10 TABLET, FILM COATED ORAL at 20:42

## 2018-05-26 RX ADMIN — GABAPENTIN 600 MG: 300 CAPSULE ORAL at 10:19

## 2018-05-26 RX ADMIN — LORATADINE 10 MG: 10 TABLET ORAL at 10:19

## 2018-05-26 RX ADMIN — CLONIDINE HYDROCHLORIDE 0.1 MG: 0.1 TABLET ORAL at 10:19

## 2018-05-26 RX ADMIN — HEPARIN SODIUM 5000 UNITS: 5000 INJECTION, SOLUTION INTRAVENOUS; SUBCUTANEOUS at 05:52

## 2018-05-26 RX ADMIN — CLONIDINE HYDROCHLORIDE 0.1 MG: 0.1 TABLET ORAL at 20:36

## 2018-05-26 RX ADMIN — ASPIRIN 81 MG 81 MG: 81 TABLET ORAL at 10:19

## 2018-05-26 RX ADMIN — Medication 150 MG: at 10:19

## 2018-05-26 RX ADMIN — METHYLPREDNISOLONE SODIUM SUCCINATE 10 MG: 40 INJECTION, POWDER, FOR SOLUTION INTRAMUSCULAR; INTRAVENOUS at 15:55

## 2018-05-26 RX ADMIN — MICONAZOLE NITRATE: 2 POWDER TOPICAL at 20:43

## 2018-05-26 RX ADMIN — CEFEPIME HYDROCHLORIDE 2 G: 2 INJECTION, POWDER, FOR SOLUTION INTRAVENOUS at 03:16

## 2018-05-26 RX ADMIN — PRAVASTATIN SODIUM 10 MG: 10 TABLET ORAL at 10:19

## 2018-05-26 RX ADMIN — INSULIN ASPART 1 UNITS: 100 INJECTION, SOLUTION INTRAVENOUS; SUBCUTANEOUS at 05:52

## 2018-05-26 RX ADMIN — INSULIN GLARGINE 20 UNITS: 100 INJECTION, SOLUTION SUBCUTANEOUS at 22:03

## 2018-05-26 RX ADMIN — Medication 1 CAPSULE: at 20:42

## 2018-05-26 NOTE — PROGRESS NOTES
[facility]    Nephrology Progress Note     Assessment & Plan      Gem Jade is a 72 year old female who was admitted on 5/25/2018.      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     2) LDKT:  Baseline cr of late 1.2-1.4.       3) Proteinuria:  15 g/g !!  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       4) ROB:  Cr 1.7 to 1.5.  Likely related to UTI/bacteremia/sepsis.  High tacro level a concern as well.       5) Encephalopathy: Under evaluation.  Perhaps related to sepsis.    6) Tacrolimus Level:  14.5 is very high for her.  This level would reflect dosing prior to arriving at hospital.  According to med rec, she last took tacrolimus 5/24 PM prior to level.  She had been therapeutic on this dose previously.       Plan:     Keep on same dose tacrolimus for now.  Tacro level today  Tacro level tomorrow.      Chang Marina MD  [facility] - Nephrology  [phone]    Interval History      Awake today.  Eyes open.  Nods yes/no to some questions though response comes slowly.      Physical Exam   Temp: 97.6  F (36.4  C) Temp src: Axillary BP: (!) 119/37   Heart Rate: 71 Resp: (!) 6 SpO2: 100 % O2 Device: BiPAP/CPAP Oxygen Delivery: 3 LPM  There were no vitals filed for this visit.  Vital Signs with Ranges  Temp:  [97.6  F (36.4  C)-98.5  F (36.9  C)] 97.6  F (36.4  C)  Heart Rate:  [66-81] 71  Resp:  [6-18] 6  BP: (104-169)/(37-76) 119/37  FiO2 (%):  [25 %] 25 %  SpO2:  [93 %-100 %] 100 %  I/O last 3 completed shifts:  In: 1500 [I.V.:1500]  Out: 1050 [Urine:1050]    GENERAL APPEARANCE: eyes open and does responds yes/no to some questions, slowly  HEENT:  Eyes/ears/nose/neck grossly normal  RESP: lungs cta b c good efforts, no crackles, rhonchi or wheezes  CV: RRR, nl S1/S2, no m/r/g   ABDOMEN: o/s/nt/nd, bs present  EXTREMITIES/SKIN: no rashes/lesions; no edema    Medications     - MEDICATION INSTRUCTIONS -       sodium chloride 1,000 mL (05/25/18 6700)      sodium chloride 125 mL/hr at 05/26/18 0012     tacrolimus (Prograf) infusion ADULT 60 mcg/hr (05/26/18 0011)       aspirin  81 mg Oral Daily     ceFEPIme (MAXIPIME) IV  2 g Intravenous Q12H     cloNIDine  0.1 mg Oral BID     famotidine  20 mg Oral BID     gabapentin  600 mg Oral BID     heparin  5,000 Units Subcutaneous Q12H     insulin aspart  1-12 Units Subcutaneous Q4H     insulin glargine  10 Units Subcutaneous At Bedtime     labetalol  150 mg Oral BID     levothyroxine  50 mcg Oral Daily     loratadine  10 mg Oral Daily     methylPREDNISolone  10 mg Intravenous Q24H     pravastatin  10 mg Oral Daily     sodium chloride (PF)  3 mL Intracatheter Q8H       Data   BMP  Recent Labs  Lab 05/26/18  0545 05/25/18  0126    133   POTASSIUM 5.0 4.8   CHLORIDE 112* 100   ROGER 8.7 9.7   CO2 19* 24   BUN 49* 49*   CR 1.50* 1.71*   * 251*     Phos@[email or web address](phos:4)  CBC)  Recent Labs  Lab 05/26/18  0545 05/25/18  0600 05/25/18  0126   WBC 6.8  --  9.1   HGB 12.0  --  12.9   HCT 36.9  --  39.4   MCV 93  --  92   * 136* 163       Recent Labs  Lab 05/25/18  0126   AST 8   ALT 17   ALKPHOS 88   BILITOTAL 0.5     No lab results found in last 7 days.  25 OH Vit D2   Date Value Ref Range Status   03/10/2017 <5 ug/L Final     25 OH Vit D3   Date Value Ref Range Status   03/10/2017 43 ug/L Final     25 OH Vit D total   Date Value Ref Range Status   03/10/2017  20 - 75 ug/L Final    <48  Season, race, dietary intake, and treatment affect the concentration of   25-hydroxy-Vitamin D. Values may decrease during winter months and increase   during summer months. Values 20-29 ug/L may indicate Vitamin D insufficiency   and values <20 ug/L may indicate Vitamin D deficiency.   This test was developed and its performance characteristics determined by the   [facility],  Special [facility] Laboratory. It has   not been cleared or approved by the FDA. The laboratory is regulated under CLIA   as  qualified to perform high-complexity testing. This test is used for clinical   purposes. It should not be regarded as investigational or for research.         Recent Labs  Lab 05/26/18  0545   HGB 12.0   HCT 36.9   MCV 93     No results for input(s): PTHI in the last 168 hours.    Attestation:   I have reviewed today's relevant vital signs, notes, medications, labs and imaging.

## 2018-05-26 NOTE — PROGRESS NOTES
[facility]    Hospitalist Progress Note    Assessment & Plan    72 year old female with multiple chronic medical problems admitted with aggressive change in mental state for the last 72 hours or so.  She has had a history of recurrent admissions for encephalopathy.     Sepsis due to E. coli bacteremia and Urinary tract infection.    -Per the  patient with worsening encephalopathic in the last 3 days prior to presentation  -Blood culture is positive for E. coli, urine culture also growing more than 100,000 colonies of lactose fermenting gram-negative rods  -Continue cefepime; await sensitivities, will narrow down antibiotics based on susceptibilities     Acute metabolic encephalopathy.    -Likely multifactorial   -Initial head CT-negative for acute findings  -TSH within normal limits, no evidence of hypoglycemia  -Patient has had problems with recurrent encephalopathies  -Keppra level high at 95, hold Keppra for now, will discuss with neurology  -Appreciate neurology consult  -Encephalopathy appears to be improving    Acute on chronic hypoxic and hypercapnic respiratory failure.    -VBG demonstrates both hypercapnia and hypoxia.    -This was more than likely due to her encephalopathy probably has obesity hypoventilation too  -Gas exchange is much improved, oxygen weaned down to 2 L  -Discontinue IMC status    Acute on chronic kidney disease-III  History of kidney transplant.    -The patient is on tacrolimus and prednisone.    -tacrolimus level were therapeutic at presentation  -Immunosuppressants now converted to IV by nephrology, appreciate consult  -Creatinine at presentation 1.71, improved to 1.5 with IV hydration  -Continue IV fluids     Diabetes mellitus type 2.  -Hemoglobin A1c 8.8   -High resistance sliding-scale insulin  -Continue prior to admission Lantus at a reduced dose of 10 units at bedtime  -Pending her ability to take p.o., will adjust insulin regimen  accordingly      Hypothyroidism.    -Continue levothyroxine.    -TSH within normal limits     Benign essential hypertension.   -PTA on clonidine and labetalol.    -Will resume once patient is able to take orals.    -IV hydralazine and labetalol as needed to be given for systolics greater than 180.     Hyperlipidemia.    -Resume pravastatin once able to take orals.       # Pain Assessment:  Current Pain Score 5/25/2018   Patient currently in pain? denies   Pain score (0-10) 0   Pain location -   Pain descriptors -   rFLACC pain score -   CPOT pain score -   - Gem denies pain and is not receiving pain medication.        D/W: RN  DVT Prophylaxis: Pneumatic Compression Devices  Code Status: Full Code    Disposition: Expected discharge 2+ days    Catrachito Robert MD    Interval History   Much more arousable compared to yesterday.  She is responding to verbal stimuli now but still appears encephalopathic.  Blood culture now positive for E. coli.  Afebrile.    -Data reviewed today: I reviewed all new labs and imaging results over the last 24 hours. I personally reviewed no images or EKG's today.      Physical Exam   Temp: 97.6  F (36.4  C) Temp src: Axillary BP: (!) 119/37   Heart Rate: 71 Resp: (!) 6 SpO2: 100 % O2 Device: BiPAP/CPAP Oxygen Delivery: 3 LPM  There were no vitals filed for this visit.  Vital Signs with Ranges  Temp:  [97.6  F (36.4  C)-98.5  F (36.9  C)] 97.6  F (36.4  C)  Heart Rate:  [66-83] 71  Resp:  [6-18] 6  BP: (104-169)/(37-76) 119/37  FiO2 (%):  [25 %] 25 %  SpO2:  [93 %-100 %] 100 %  I/O last 3 completed shifts:  In: 1500 [I.V.:1500]  Out: 1050 [Urine:1050]    Constitutional: Awake, follows few verbal commands, still encephalopathic however improved compared to yesterday   HEENT: Moist oral mucosa  Neck- Supple, Good ROM, No JVD  Respiratory:  No crackles, No wheezes, CTA B/L, Normal WOB  Cardiovascular: RRR, No murmur  GI: Soft, Non- tender, BS- normoactive, No  Guarding/rebound/rigidity  Skin/Integument: Warm and dry, no rashes  MSK: No joint deformity or swelling, no edema  Neuro: Cranial nerves grossly appeared to be intact.    Medications       - MEDICATION INSTRUCTIONS -       sodium chloride 1,000 mL (05/25/18 0825)     sodium chloride 125 mL/hr at 05/26/18 0012     tacrolimus (Prograf) infusion ADULT 60 mcg/hr (05/26/18 0011)         aspirin  81 mg Oral Daily     ceFEPIme (MAXIPIME) IV  2 g Intravenous Q12H     cloNIDine  0.1 mg Oral BID     famotidine  20 mg Oral BID     gabapentin  600 mg Oral BID     heparin  5,000 Units Subcutaneous Q12H     insulin aspart  1-12 Units Subcutaneous Q4H     insulin glargine  10 Units Subcutaneous At Bedtime     labetalol  150 mg Oral BID     levETIRAcetam  1,000 mg Intravenous Q12H     levothyroxine  50 mcg Oral Daily     loratadine  10 mg Oral Daily     methylPREDNISolone  10 mg Intravenous Q24H     pravastatin  10 mg Oral Daily     sodium chloride (PF)  3 mL Intracatheter Q8H       Data       Recent Labs  Lab 05/26/18  0545 05/25/18  0600 05/25/18  0126   WBC 6.8  --  9.1   HGB 12.0  --  12.9   MCV 93  --  92   * 136* 163     --  133   POTASSIUM 5.0  --  4.8   CHLORIDE 112*  --  100   CO2 19*  --  24   BUN 49*  --  49*   CR 1.50*  --  1.71*   ANIONGAP 10  --  9   ROGER 8.7  --  9.7   *  --  251*   ALBUMIN  --   --  3.0*   PROTTOTAL  --   --  8.6   BILITOTAL  --   --  0.5   ALKPHOS  --   --  88   ALT  --   --  17   AST  --   --  8       No results found for this or any previous visit (from the past 24 hour(s)).

## 2018-05-26 NOTE — PLAN OF CARE
Problem: Patient Care Overview  Goal: Plan of Care/Patient Progress Review  Outcome: Improving  This morning patient is alert and oriented x self but otherwise not able to verbalize date, time or location. She is able to answer yes and no questions appropriately. She was able to take her pills with apple sauce this morning successfully. Tele SR with rates in the 70's. Urine output significantly improved from yesterday >300 ml out just this morning. She continues to have general breakdown on her bottom and was turned and repositioned every 2 hrs. Update given to Chang, her .

## 2018-05-26 NOTE — PROGRESS NOTES
[facility] - NEUROLOGY PROGRESS NOTE  Name: Gem Jade  DOS: 05/26/2018    Chart review only - patient not seen today.    S)  Patient being treated for urosepsis, with >100K CFUs of E coli in urine, and positive blood cultures.    O)  EEG from 5/25/18 reviewed - generalized slowing consistent with a moderate to severe encephalopathy - no ongoing seizure activity. This was different from the prior EEG 1/19/18-1/20/18, which demonstrated a pattern of GPEDs which responded to treatment with keppra, and which was felt to be consistent with an atypical non-convulsive status epilepticus.    A/P)  Keppra level from 5/25/18 0900 resulted at 95 - supratherapeutic, despite reportedly missing her last keppra dose due to encephalopathy. Inadequate renal clearance may play a role (GFR on admit was 29). Would be appropriate to reduce her dose to 500 BID for now, though may require a slight increase in the future should her renal function improve.    Recs:  1. Reduce keppra to 500 BID  2. Repeat keppra level in 1 week    Chang Vaughn MD. Neurology. Text Page  5/26/2018 6:06 PM

## 2018-05-26 NOTE — PLAN OF CARE
Problem: Patient Care Overview  Goal: Plan of Care/Patient Progress Review  Outcome: No Change  Patient started shift off very lethargic, took some effort to arouse. Aroused to voice rest of night, follows commands but confused. VSS. Slept with CPAP. Good UOP,no BM

## 2018-05-26 NOTE — PROCEDURES
Procedure Date: 2018      EEG #:   - 1 hour.        DATE OF RECORDIN2018.        DURATION OF RECORDIN  hour and 0 minutes.      CLINICAL HISTORY:  This patient is a 72-year-old woman with a history of kidney transplant, history of recurrent UTI, history of seizures, encephalopathy, presented with altered mental status.  EEG was requested for evaluation for seizures versus encephalopathy.      CURRENT MEDICATIONS:  Keppra, Catapres, Maxipime, gabapentin, labetalol, insulin, Synthroid.      TECHNICAL SUMMARY: This continuous video- EEG monitoring procedure was performed with 23 scalp electrodes in 10-20 electrode system placements, and additional scalp, precordial and other surface electrodes used for electrical referencing and artifact detection.  Video monitoring was utilized and periodically reviewed by EEG technologists and the physician for electroclinical correlations.     BACKGROUND ACTIVITIES:  The background activities of this EEG was symmetric and consisted of moderate to severe generalized slowing with mixed theta and delta activities throughout the recording.  Hyperventilation and photic stimulation were not performed.  Sleep stages were recorded with poorly formed sleep architectures.      No interictal epileptiform activities were observed.      ICTAL ACTIVITIES:  None.      SUMMARY OF 1 HOUR OF VIDEO EEG MONITORING:  This EEG is abnormal due to the presence of moderate to severe generalized slowing of the background activities.  These findings are consistent with moderate to severe diffuse encephalopathy of which the etiology is nonspecific.  No epileptiform activities or seizures were recorded.         YAZMIN KIRK MD             D: 2018   T: 2018   MT: SONIA      Name:     MARYJANE SHAVER   MRN:      [id number]        Account:        [id number]   :      1945           Procedure Date: 2018      Document: [id number]

## 2018-05-27 LAB
ANION GAP SERPL CALCULATED.3IONS-SCNC: 7 MMOL/L (ref 3–14)
BACTERIA SPEC CULT: ABNORMAL
BUN SERPL-MCNC: 54 MG/DL (ref 7–30)
CALCIUM SERPL-MCNC: 8.9 MG/DL (ref 8.5–10.1)
CHLORIDE SERPL-SCNC: 114 MMOL/L (ref 94–109)
CO2 SERPL-SCNC: 19 MMOL/L (ref 20–32)
CREAT SERPL-MCNC: 1.48 MG/DL (ref 0.52–1.04)
GFR SERPL CREATININE-BSD FRML MDRD: 35 ML/MIN/1.7M2
GLUCOSE BLDC GLUCOMTR-MCNC: 105 MG/DL (ref 70–99)
GLUCOSE BLDC GLUCOMTR-MCNC: 111 MG/DL (ref 70–99)
GLUCOSE BLDC GLUCOMTR-MCNC: 116 MG/DL (ref 70–99)
GLUCOSE BLDC GLUCOMTR-MCNC: 131 MG/DL (ref 70–99)
GLUCOSE BLDC GLUCOMTR-MCNC: 133 MG/DL (ref 70–99)
GLUCOSE BLDC GLUCOMTR-MCNC: 232 MG/DL (ref 70–99)
GLUCOSE SERPL-MCNC: 163 MG/DL (ref 70–99)
Lab: ABNORMAL
Lab: ABNORMAL
POTASSIUM SERPL-SCNC: 4.7 MMOL/L (ref 3.4–5.3)
SODIUM SERPL-SCNC: 140 MMOL/L (ref 133–144)
SPECIMEN SOURCE: ABNORMAL
SPECIMEN SOURCE: ABNORMAL
TACROLIMUS BLD-MCNC: 13.8 UG/L (ref 5–15)
TACROLIMUS BLD-MCNC: 16.9 UG/L (ref 5–15)
TME LAST DOSE: ABNORMAL H

## 2018-05-27 PROCEDURE — 25000128 H RX IP 250 OP 636: Performed by: INTERNAL MEDICINE

## 2018-05-27 PROCEDURE — 40000275 ZZH STATISTIC RCP TIME EA 10 MIN

## 2018-05-27 PROCEDURE — 25000132 ZZH RX MED GY IP 250 OP 250 PS 637: Mod: GY | Performed by: INTERNAL MEDICINE

## 2018-05-27 PROCEDURE — 80197 ASSAY OF TACROLIMUS: CPT | Performed by: INTERNAL MEDICINE

## 2018-05-27 PROCEDURE — 21000000 ZZH R&B IMCU HEART CARE

## 2018-05-27 PROCEDURE — 27210995 ZZH RX 272: Performed by: INTERNAL MEDICINE

## 2018-05-27 PROCEDURE — A9270 NON-COVERED ITEM OR SERVICE: HCPCS | Mod: GY | Performed by: INTERNAL MEDICINE

## 2018-05-27 PROCEDURE — 00000146 ZZHCL STATISTIC GLUCOSE BY METER IP

## 2018-05-27 PROCEDURE — 80048 BASIC METABOLIC PNL TOTAL CA: CPT | Performed by: INTERNAL MEDICINE

## 2018-05-27 PROCEDURE — 36415 COLL VENOUS BLD VENIPUNCTURE: CPT | Performed by: INTERNAL MEDICINE

## 2018-05-27 PROCEDURE — 40000914 ZZH STATISTIC SITTER, DAY HOURS

## 2018-05-27 PROCEDURE — 99233 SBSQ HOSP IP/OBS HIGH 50: CPT | Performed by: INTERNAL MEDICINE

## 2018-05-27 PROCEDURE — 87040 BLOOD CULTURE FOR BACTERIA: CPT | Performed by: INTERNAL MEDICINE

## 2018-05-27 RX ORDER — CEFTRIAXONE 2 G/1
2 INJECTION, POWDER, FOR SOLUTION INTRAMUSCULAR; INTRAVENOUS EVERY 24 HOURS
Status: DISCONTINUED | OUTPATIENT
Start: 2018-05-27 | End: 2018-06-01 | Stop reason: HOSPADM

## 2018-05-27 RX ADMIN — CEFTRIAXONE SODIUM 2 G: 2 INJECTION, POWDER, FOR SOLUTION INTRAMUSCULAR; INTRAVENOUS at 10:28

## 2018-05-27 RX ADMIN — HEPARIN SODIUM 5000 UNITS: 5000 INJECTION, SOLUTION INTRAVENOUS; SUBCUTANEOUS at 05:50

## 2018-05-27 RX ADMIN — PRAVASTATIN SODIUM 10 MG: 10 TABLET ORAL at 10:30

## 2018-05-27 RX ADMIN — ASPIRIN 81 MG 81 MG: 81 TABLET ORAL at 10:28

## 2018-05-27 RX ADMIN — GABAPENTIN 600 MG: 300 CAPSULE ORAL at 10:29

## 2018-05-27 RX ADMIN — LORATADINE 10 MG: 10 TABLET ORAL at 10:30

## 2018-05-27 RX ADMIN — LEVOTHYROXINE SODIUM 50 MCG: 50 TABLET ORAL at 10:29

## 2018-05-27 RX ADMIN — SODIUM BICARBONATE: 84 INJECTION, SOLUTION INTRAVENOUS at 19:49

## 2018-05-27 RX ADMIN — CEFEPIME HYDROCHLORIDE 2 G: 2 INJECTION, POWDER, FOR SOLUTION INTRAVENOUS at 04:59

## 2018-05-27 RX ADMIN — SODIUM CHLORIDE: 9 INJECTION, SOLUTION INTRAVENOUS at 02:57

## 2018-05-27 RX ADMIN — Medication 1 CAPSULE: at 10:28

## 2018-05-27 RX ADMIN — CLONIDINE HYDROCHLORIDE 0.1 MG: 0.1 TABLET ORAL at 10:29

## 2018-05-27 RX ADMIN — LEVETIRACETAM 500 MG: 500 TABLET, FILM COATED ORAL at 10:28

## 2018-05-27 RX ADMIN — INSULIN ASPART 4 UNITS: 100 INJECTION, SOLUTION INTRAVENOUS; SUBCUTANEOUS at 02:57

## 2018-05-27 RX ADMIN — Medication 150 MG: at 10:28

## 2018-05-27 RX ADMIN — FAMOTIDINE 20 MG: 10 TABLET, FILM COATED ORAL at 10:29

## 2018-05-27 NOTE — PROGRESS NOTES
[facility]    Hospitalist Progress Note    Assessment & Plan    72 year old female with multiple chronic medical problems admitted with aggressive change in mental state for the last 72 hours or so.  She has had a history of recurrent admissions for encephalopathy.     Sepsis due to E. coli bacteremia and Urinary tract infection.    -Per the  patient with worsening encephalopathy in the last 3 days prior to presentation  -Blood culture and urine culture is positive for E. Coli  -Continue  Rocephin(initially on cefepime)     Acute metabolic encephalopathy.    -Likely multifactorial   -Initial head CT-negative for acute findings  -TSH within normal limits, no evidence of hypoglycemia  -Patient has had problems with recurrent encephalopathies  -Keppra level high at 95, discussed with neurology yesterday, after skipping 1 dose restarted on 500 mg twice daily(50% of home dose)  -Appreciate neurology consult  -Appears to be more somnolent and drowsy today compared to yesterday  -Waxing and waning encephalopathy- ?  Will discuss with neurology tomorrow morning if there is no appreciable improvement.    Acute on chronic hypoxic and hypercapnic respiratory failure.    -VBG demonstrates both hypercapnia and hypoxia.    -This was more than likely due to her encephalopathy probably has obesity hypoventilation too  -Gas exchange much improved, oxygen weaned off to room air    Acute on chronic kidney disease-III  History of kidney transplant.    -The patient is on tacrolimus and prednisone.    -tacrolimus level were supra- therapeutic at presentation  -Initially placed on IV tacrolimus however now on hold, being managed by nephrology, recheck levels in the morning  -Creatinine at presentation 1.71, improved to 1.5 with IV hydration  -Continue IV fluids per nephrology.     Diabetes mellitus type 2.  -Hemoglobin A1c 8.8   -High resistance sliding-scale insulin  -Continue prior to admission Lantus at a  reduced dose of 20 units at bedtime  -Pending her ability to take p.o., will adjust insulin regimen accordingly      Hypothyroidism.    -Continue levothyroxine.    -TSH within normal limits     Benign essential hypertension.   -PTA on clonidine and labetalol.    -Continue clonidine and p.o. labetalol  -IV hydralazine and labetalol as needed to be given for systolics greater than 180.     Hyperlipidemia.    -Resume pravastatin once able to take orals.       # Pain Assessment:  Current Pain Score 5/27/2018   Patient currently in pain? denies   Pain score (0-10) -   Pain location -   Pain descriptors -   rFLACC pain score -   CPOT pain score -   - Gem denies pain and is not receiving pain medication.        D/W: RN  DVT Prophylaxis: Pneumatic Compression Devices  Code Status: Full Code    Disposition: Expected discharge 2+ days    Catrachito Robert MD    Interval History   After the initial improvement appears to be a little more drowsy and somnolent compared to yesterday.  Afebrile.  Weaned off oxygen    -Data reviewed today: I reviewed all new labs and imaging results over the last 24 hours. I personally reviewed no images or EKG's today.      Physical Exam   Temp: 97.8  F (36.6  C) Temp src: Oral BP: 120/50   Heart Rate: 65 Resp: 14 SpO2: 98 % O2 Device: None (Room air) Oxygen Delivery: 2 LPM  Vitals:    05/26/18 1450 05/27/18 0600   Weight: 94.3 kg (207 lb 14.4 oz) 97.5 kg (214 lb 14.4 oz)     Vital Signs with Ranges  Temp:  [97.4  F (36.3  C)-97.8  F (36.6  C)] 97.8  F (36.6  C)  Heart Rate:  [62-82] 65  Resp:  [11-26] 14  BP: (119-198)/(44-95) 120/50  SpO2:  [94 %-100 %] 98 %  I/O last 3 completed shifts:  In: 2124 [P.O.:200; I.V.:1924]  Out: 1075 [Urine:1075]    Constitutional: Awake, opens eyes to verbal cues however not following much commands today.  Unable to verbalize anything today.  HEENT: Moist oral mucosa  Neck- Supple, Good ROM, No JVD  Respiratory:  No crackles, No wheezes, CTA B/L, Normal  WOB  Cardiovascular: RRR, No murmur  GI: Soft, Non- tender, BS- normoactive  Skin/Integument: Warm and dry, no rashes  MSK: No joint deformity or swelling, no edema  Neuro: Cranial nerves grossly appeared to be intact although unable to accurately assess as patient not following command.    Medications       - MEDICATION INSTRUCTIONS -       sodium chloride 1,000 mL (05/25/18 0825)     sodium chloride 75 mL/hr at 05/27/18 0257     tacrolimus (Prograf) infusion ADULT 60 mcg/hr (05/26/18 1728)         aspirin  81 mg Oral Daily     cefTRIAXone  2 g Intravenous Q24H     cloNIDine (CATAPRES) tablet 0.1 mg  0.1 mg Oral BID     famotidine  20 mg Oral BID     gabapentin  600 mg Oral BID     heparin  5,000 Units Subcutaneous Q12H     insulin aspart  1-12 Units Subcutaneous Q4H     insulin glargine  20 Units Subcutaneous At Bedtime     labetalol  150 mg Oral BID     lactobacillus rhamnosus (GG)  1 capsule Oral BID     levETIRAcetam  500 mg Oral BID     levothyroxine  50 mcg Oral Daily     loratadine  10 mg Oral Daily     methylPREDNISolone  10 mg Intravenous Q24H     pravastatin  10 mg Oral Daily     sodium chloride (PF)  3 mL Intracatheter Q8H       Data       Recent Labs  Lab 05/27/18  0525 05/26/18  0545 05/25/18  0600 05/25/18  0126   WBC  --  6.8  --  9.1   HGB  --  12.0  --  12.9   MCV  --  93  --  92   PLT  --  132* 136* 163    141  --  133   POTASSIUM 4.7 5.0  --  4.8   CHLORIDE 114* 112*  --  100   CO2 19* 19*  --  24   BUN 54* 49*  --  49*   CR 1.48* 1.50*  --  1.71*   ANIONGAP 7 10  --  9   ROGER 8.9 8.7  --  9.7   * 195*  --  251*   ALBUMIN  --   --   --  3.0*   PROTTOTAL  --   --   --  8.6   BILITOTAL  --   --   --  0.5   ALKPHOS  --   --   --  88   ALT  --   --   --  17   AST  --   --   --  8       No results found for this or any previous visit (from the past 24 hour(s)).

## 2018-05-27 NOTE — PHARMACY
Prescriber Notification Note    The pharmacist has communicated with this patient's provider regarding a concern or therapy recommendation.    Notified Person: Dr. Marina  Date/Time of Notification: 5/27 at 1430  Interaction: phone  Concern/Recommendation: Tacrolimus levels finally resulted this afternoon from yesterday = 16.9 and today = 13.8. Please advise. Patient's goal is 4-6.      Comments/Additional Details: Will stop drip - let level fall and get another level in AM per MD.     Kiera Swann, PharmD

## 2018-05-27 NOTE — PLAN OF CARE
Problem: Patient Care Overview  Goal: Plan of Care/Patient Progress Review  Outcome: No Change  Pt stable over night, VSS, Pt on RA with good sat's.  Pt denies pain or SOB and doesn't appear to be in discomfort but her ability to answer question comes and goes.  Some times she is able to say she is in FSH and will answer more complex questions other times she is only able to nod yes or no to simple questions.  Pt is turned every 2 hours.  Pt has an excoriated perineal area that is cover with barrier paste.

## 2018-05-27 NOTE — PROGRESS NOTES
SPIRITUAL HEALTH SERVICES Progress Note  [facility] CCU    Visited pt per request. Pt is known to this  from a previous visit. When I visited, pt's eyes were open but pt did not speak. Pt made eye contact and seemed to give a slight nod yes when I offered prayer. I provided prayer for pt. SH will continue to follow as available as length of stay persists.       Cornelia Pyle  Chaplain Resident  Pager: [phone]  Office: [phone]

## 2018-05-27 NOTE — PROGRESS NOTES
[facility]    Nephrology Progress Note     Assessment & Plan     Gem Jade is a 72 year old female who was admitted on 5/25/2018.       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      2) LDKT:  Baseline cr of late 1.2-1.4.  On IV antirejection medicines as she has trouble taking po now.   On solumedrol 10 mg daily for prednisone 5 mg daily (a slightly higher dose to provide a little adrenal support).  On tacrolimus 60 mcg/hour = 1.44 mg per day.  This is ~30% of normal po dose.        3) Proteinuria:  15 g/g !!  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        4) ROB:  Cr 1.7 to 1.5 to 1.48.  Likely related to UTI/bacteremia/sepsis.  High tacro level a concern as well.        5) Encephalopathy: Under evaluation.  Perhaps related to sepsis. High Keppra level?  High tacro level ?     6) Tacrolimus Level:  14.5 is very high for her.  This level would reflect dosing prior to arriving at hospital.  According to med rec, she last took tacrolimus 5/24 PM prior to level.  She had been therapeutic on this dose previously.  Follow up levels of yesterday and today are still in process.      7) UTI/Sepsis with bacteremia:  Terry sensitive E coli.  On rocephin.      8) Acidosis: She is getting a little dilutional acidosis.  Best change IV to add some bicarb.        Plan:    Adjust tacrolimus dosing based on levels.    Addendum:    Tacrolimus level yesterday was up to 16.9.  Today it was down to 13.8.    This is still way too high.  Best stop the infusion and recheck a level in AM.        Chang Marina MD  [facility] - Nephrology  [phone]    Interval History     Resting in bed.  Opens eyes.  Nods just slightly to some questions.  Not very interactive.    Physical Exam   Temp: 97.8  F (36.6  C) Temp src: Oral BP: 120/50   Heart Rate: 65 Resp: 14 SpO2: 98 % O2 Device: None (Room air) Oxygen Delivery: 2 LPM  Vitals:    05/26/18 1450 05/27/18 0600   Weight: 94.3 kg  (207 lb 14.4 oz) 97.5 kg (214 lb 14.4 oz)     Vital Signs with Ranges  Temp:  [97.4  F (36.3  C)-97.8  F (36.6  C)] 97.8  F (36.6  C)  Heart Rate:  [62-82] 65  Resp:  [11-26] 14  BP: (119-198)/(44-95) 120/50  SpO2:  [94 %-100 %] 98 %  I/O last 3 completed shifts:  In: 2124 [P.O.:200; I.V.:1924]  Out: 1075 [Urine:1075]    GENERAL APPEARANCE: pleasant, NAD, a & o  HEENT:  Eyes/ears/nose/neck grossly normal  RESP: lungs cta b c good efforts, no crackles, rhonchi or wheezes  CV: RRR, nl S1/S2, no m/r/g   ABDOMEN: o/s/nt/nd, bs present  EXTREMITIES/SKIN: no rashes/lesions; tr edema    Medications     - MEDICATION INSTRUCTIONS -       sodium chloride 1,000 mL (05/25/18 0825)     sodium chloride 75 mL/hr at 05/27/18 0257     tacrolimus (Prograf) infusion ADULT 60 mcg/hr (05/26/18 1728)       aspirin  81 mg Oral Daily     cefTRIAXone  2 g Intravenous Q24H     cloNIDine (CATAPRES) tablet 0.1 mg  0.1 mg Oral BID     famotidine  20 mg Oral BID     gabapentin  600 mg Oral BID     heparin  5,000 Units Subcutaneous Q12H     insulin aspart  1-12 Units Subcutaneous Q4H     insulin glargine  20 Units Subcutaneous At Bedtime     labetalol  150 mg Oral BID     lactobacillus rhamnosus (GG)  1 capsule Oral BID     levETIRAcetam  500 mg Oral BID     levothyroxine  50 mcg Oral Daily     loratadine  10 mg Oral Daily     methylPREDNISolone  10 mg Intravenous Q24H     pravastatin  10 mg Oral Daily     sodium chloride (PF)  3 mL Intracatheter Q8H       Data   BMP  Recent Labs  Lab 05/27/18  0525 05/26/18  0545 05/25/18  0126    141 133   POTASSIUM 4.7 5.0 4.8   CHLORIDE 114* 112* 100   ROGER 8.9 8.7 9.7   CO2 19* 19* 24   BUN 54* 49* 49*   CR 1.48* 1.50* 1.71*   * 195* 251*     [email or web address]  CBC)  Recent Labs  Lab 05/26/18  0545 05/25/18  0600 05/25/18  0126   WBC 6.8  --  9.1   HGB 12.0  --  12.9   HCT 36.9  --  39.4   MCV 93  --  92   * 136* 163       Recent Labs  Lab 05/25/18  0126   AST 8   ALT 17   ALKPHOS 88    BILITOTAL 0.5     No lab results found in last 7 days.  25 OH Vit D2   Date Value Ref Range Status   03/10/2017 <5 ug/L Final     25 OH Vit D3   Date Value Ref Range Status   03/10/2017 43 ug/L Final     25 OH Vit D total   Date Value Ref Range Status   03/10/2017  20 - 75 ug/L Final    <48  Season, race, dietary intake, and treatment affect the concentration of   25-hydroxy-Vitamin D. Values may decrease during winter months and increase   during summer months. Values 20-29 ug/L may indicate Vitamin D insufficiency   and values <20 ug/L may indicate Vitamin D deficiency.   This test was developed and its performance characteristics determined by the   [facility],  [facility]. It has   not been cleared or approved by the FDA. The laboratory is regulated under CLIA   as qualified to perform high-complexity testing. This test is used for clinical   purposes. It should not be regarded as investigational or for research.         Recent Labs  Lab 05/26/18  0545   HGB 12.0   HCT 36.9   MCV 93     No results for input(s): PTHI in the last 168 hours.    Attestation:   I have reviewed today's relevant vital signs, notes, medications, labs and imaging.

## 2018-05-27 NOTE — PROGRESS NOTES
Spoke with RN in regards to Picc order.  Communicated that patient is very difficult lab draw, at this point needs only 1 piv, but that could change.  Patient has positive blood cultures, with cultures being re-drawn today.  We will want to hold off on central access, until those cultures are negative.  Currently has 2 Pivs.  Will re-assess situation tomorrow, may possibly consider a midline vs a picc, as long as patient doesn't need central access, but will depend on BC too.

## 2018-05-27 NOTE — PLAN OF CARE
Problem: Patient Care Overview  Goal: Plan of Care/Patient Progress Review  Outcome: No Change  Intermittently awake but otherwise unable to verbalize any other points of orientation today. VS stable and is on RA. Tele SR with rates in the 60's. She was able to move her arms with more success today during bathing. Bottom continues to have wide spread excoriation and smaller open areas. She was able to take all of her pills this morning and has eaten 10-25% of each meals. UOP has been low 350ml out this morning. Blood glucose has been controlled 115.

## 2018-05-28 ENCOUNTER — ANESTHESIA EVENT (OUTPATIENT)
Dept: CARDIOVASCULAR ICU | Facility: CLINIC | Age: 73
DRG: 698 | End: 2018-05-28
Payer: MEDICARE

## 2018-05-28 ENCOUNTER — ANESTHESIA (OUTPATIENT)
Dept: CARDIOVASCULAR ICU | Facility: CLINIC | Age: 73
DRG: 698 | End: 2018-05-28
Payer: MEDICARE

## 2018-05-28 ENCOUNTER — APPOINTMENT (OUTPATIENT)
Dept: GENERAL RADIOLOGY | Facility: CLINIC | Age: 73
DRG: 698 | End: 2018-05-28
Attending: INTERNAL MEDICINE
Payer: MEDICARE

## 2018-05-28 ENCOUNTER — APPOINTMENT (OUTPATIENT)
Dept: MRI IMAGING | Facility: CLINIC | Age: 73
DRG: 698 | End: 2018-05-28
Attending: INTERNAL MEDICINE
Payer: MEDICARE

## 2018-05-28 LAB
ALBUMIN SERPL-MCNC: 2.4 G/DL (ref 3.4–5)
ALP SERPL-CCNC: 59 U/L (ref 40–150)
ALT SERPL W P-5'-P-CCNC: 14 U/L (ref 0–50)
ANION GAP SERPL CALCULATED.3IONS-SCNC: 10 MMOL/L (ref 3–14)
AST SERPL W P-5'-P-CCNC: 17 U/L (ref 0–45)
BASE DEFICIT BLDA-SCNC: 4.9 MMOL/L
BILIRUB SERPL-MCNC: 0.3 MG/DL (ref 0.2–1.3)
BUN SERPL-MCNC: 49 MG/DL (ref 7–30)
CALCIUM SERPL-MCNC: 8.9 MG/DL (ref 8.5–10.1)
CHLORIDE SERPL-SCNC: 116 MMOL/L (ref 94–109)
CO2 SERPL-SCNC: 16 MMOL/L (ref 20–32)
CREAT SERPL-MCNC: 1.63 MG/DL (ref 0.52–1.04)
ERYTHROCYTE [DISTWIDTH] IN BLOOD BY AUTOMATED COUNT: 13.3 % (ref 10–15)
ERYTHROCYTE [DISTWIDTH] IN BLOOD BY AUTOMATED COUNT: 13.4 % (ref 10–15)
GFR SERPL CREATININE-BSD FRML MDRD: 31 ML/MIN/1.7M2
GLUCOSE BLDC GLUCOMTR-MCNC: 102 MG/DL (ref 70–99)
GLUCOSE BLDC GLUCOMTR-MCNC: 147 MG/DL (ref 70–99)
GLUCOSE BLDC GLUCOMTR-MCNC: 157 MG/DL (ref 70–99)
GLUCOSE BLDC GLUCOMTR-MCNC: 170 MG/DL (ref 70–99)
GLUCOSE BLDC GLUCOMTR-MCNC: 54 MG/DL (ref 70–99)
GLUCOSE BLDC GLUCOMTR-MCNC: 59 MG/DL (ref 70–99)
GLUCOSE BLDC GLUCOMTR-MCNC: 80 MG/DL (ref 70–99)
GLUCOSE BLDC GLUCOMTR-MCNC: 94 MG/DL (ref 70–99)
GLUCOSE BLDC GLUCOMTR-MCNC: 94 MG/DL (ref 70–99)
GLUCOSE BLDC GLUCOMTR-MCNC: 98 MG/DL (ref 70–99)
GLUCOSE SERPL-MCNC: 54 MG/DL (ref 70–99)
HCO3 BLD-SCNC: 20 MMOL/L (ref 21–28)
HCT VFR BLD AUTO: 33.2 % (ref 35–47)
HCT VFR BLD AUTO: 34.2 % (ref 35–47)
HGB BLD-MCNC: 10.6 G/DL (ref 11.7–15.7)
HGB BLD-MCNC: 10.9 G/DL (ref 11.7–15.7)
MCH RBC QN AUTO: 29.6 PG (ref 26.5–33)
MCH RBC QN AUTO: 29.7 PG (ref 26.5–33)
MCHC RBC AUTO-ENTMCNC: 31.9 G/DL (ref 31.5–36.5)
MCHC RBC AUTO-ENTMCNC: 31.9 G/DL (ref 31.5–36.5)
MCV RBC AUTO: 93 FL (ref 78–100)
MCV RBC AUTO: 93 FL (ref 78–100)
O2/TOTAL GAS SETTING VFR VENT: ABNORMAL %
OXYHGB MFR BLD: 99 % (ref 92–100)
PCO2 BLD: 37 MM HG (ref 35–45)
PH BLD: 7.35 PH (ref 7.35–7.45)
PHOSPHATE SERPL-MCNC: 3 MG/DL (ref 2.5–4.5)
PLATELET # BLD AUTO: 156 10E9/L (ref 150–450)
PLATELET # BLD AUTO: ABNORMAL 10E9/L (ref 150–450)
PO2 BLD: 145 MM HG (ref 80–105)
POTASSIUM SERPL-SCNC: 4.3 MMOL/L (ref 3.4–5.3)
PROT SERPL-MCNC: 6.9 G/DL (ref 6.8–8.8)
RBC # BLD AUTO: 3.58 10E12/L (ref 3.8–5.2)
RBC # BLD AUTO: 3.67 10E12/L (ref 3.8–5.2)
SODIUM SERPL-SCNC: 142 MMOL/L (ref 133–144)
TACROLIMUS BLD-MCNC: 8 UG/L (ref 5–15)
TACROLIMUS BLD-MCNC: NORMAL UG/L (ref 5–15)
TME LAST DOSE: NORMAL H
WBC # BLD AUTO: 4.9 10E9/L (ref 4–11)
WBC # BLD AUTO: 6.1 10E9/L (ref 4–11)

## 2018-05-28 PROCEDURE — 25000128 H RX IP 250 OP 636: Performed by: PSYCHIATRY & NEUROLOGY

## 2018-05-28 PROCEDURE — 25000128 H RX IP 250 OP 636: Performed by: INTERNAL MEDICINE

## 2018-05-28 PROCEDURE — 25800025 ZZH RX 258: Performed by: INTERNAL MEDICINE

## 2018-05-28 PROCEDURE — 00000146 ZZHCL STATISTIC GLUCOSE BY METER IP

## 2018-05-28 PROCEDURE — 99233 SBSQ HOSP IP/OBS HIGH 50: CPT | Performed by: INTERNAL MEDICINE

## 2018-05-28 PROCEDURE — 20000003 ZZH R&B ICU

## 2018-05-28 PROCEDURE — 36415 COLL VENOUS BLD VENIPUNCTURE: CPT | Performed by: INTERNAL MEDICINE

## 2018-05-28 PROCEDURE — 40000671 ZZH STATISTIC ANESTHESIA CASE

## 2018-05-28 PROCEDURE — 27211289 ZZ H KIT CATH IV 4FR 8 CM OR 10 CM, POWERWAND QUICK XL

## 2018-05-28 PROCEDURE — 40000275 ZZH STATISTIC RCP TIME EA 10 MIN

## 2018-05-28 PROCEDURE — 25000128 H RX IP 250 OP 636: Performed by: HOSPITALIST

## 2018-05-28 PROCEDURE — 85027 COMPLETE CBC AUTOMATED: CPT | Performed by: INTERNAL MEDICINE

## 2018-05-28 PROCEDURE — 25800025 ZZH RX 258: Performed by: HOSPITALIST

## 2018-05-28 PROCEDURE — 82805 BLOOD GASES W/O2 SATURATION: CPT | Performed by: INTERNAL MEDICINE

## 2018-05-28 PROCEDURE — 80197 ASSAY OF TACROLIMUS: CPT | Performed by: INTERNAL MEDICINE

## 2018-05-28 PROCEDURE — 25000131 ZZH RX MED GY IP 250 OP 636 PS 637: Mod: GY | Performed by: INTERNAL MEDICINE

## 2018-05-28 PROCEDURE — 71045 X-RAY EXAM CHEST 1 VIEW: CPT

## 2018-05-28 PROCEDURE — 25000125 ZZHC RX 250: Performed by: INTERNAL MEDICINE

## 2018-05-28 PROCEDURE — 36600 WITHDRAWAL OF ARTERIAL BLOOD: CPT

## 2018-05-28 PROCEDURE — 80053 COMPREHEN METABOLIC PANEL: CPT | Performed by: INTERNAL MEDICINE

## 2018-05-28 PROCEDURE — 36569 INSJ PICC 5 YR+ W/O IMAGING: CPT

## 2018-05-28 PROCEDURE — 95951 ZZHC EEG VIDEO EACH 24 HR: CPT

## 2018-05-28 PROCEDURE — 84100 ASSAY OF PHOSPHORUS: CPT | Performed by: INTERNAL MEDICINE

## 2018-05-28 PROCEDURE — 70551 MRI BRAIN STEM W/O DYE: CPT

## 2018-05-28 PROCEDURE — 25000125 ZZHC RX 250: Performed by: HOSPITALIST

## 2018-05-28 PROCEDURE — 37000011 ZZH ANESTHESIA WARD SERVICE: Performed by: NURSE ANESTHETIST, CERTIFIED REGISTERED

## 2018-05-28 RX ORDER — LEVETIRACETAM 1000 MG/1
1000 TABLET ORAL 2 TIMES DAILY
Qty: 60 TABLET | Refills: 1 | Status: SHIPPED | OUTPATIENT
Start: 2018-05-28 | End: 2018-06-01

## 2018-05-28 RX ORDER — FAMOTIDINE 10 MG
20 TABLET ORAL DAILY
Status: DISCONTINUED | OUTPATIENT
Start: 2018-05-29 | End: 2018-05-28 | Stop reason: ALTCHOICE

## 2018-05-28 RX ORDER — LEVETIRACETAM 5 MG/ML
500 INJECTION INTRAVASCULAR EVERY 12 HOURS
Status: DISCONTINUED | OUTPATIENT
Start: 2018-05-28 | End: 2018-05-28

## 2018-05-28 RX ORDER — GABAPENTIN 100 MG/1
200 CAPSULE ORAL 2 TIMES DAILY
Status: DISCONTINUED | OUTPATIENT
Start: 2018-05-28 | End: 2018-05-29

## 2018-05-28 RX ORDER — CLONIDINE HYDROCHLORIDE 0.1 MG/1
0.2 TABLET ORAL 2 TIMES DAILY
Status: DISCONTINUED | OUTPATIENT
Start: 2018-05-28 | End: 2018-05-29

## 2018-05-28 RX ORDER — PRAVASTATIN SODIUM 10 MG
10 TABLET ORAL DAILY
Qty: 90 TABLET | Refills: 3 | Status: SHIPPED | OUTPATIENT
Start: 2018-05-28 | End: 2019-07-26

## 2018-05-28 RX ORDER — METOPROLOL TARTRATE 1 MG/ML
5 INJECTION, SOLUTION INTRAVENOUS EVERY 6 HOURS
Status: DISCONTINUED | OUTPATIENT
Start: 2018-05-28 | End: 2018-05-30

## 2018-05-28 RX ORDER — LEVOTHYROXINE SODIUM ANHYDROUS 100 UG/5ML
25 INJECTION, POWDER, LYOPHILIZED, FOR SOLUTION INTRAVENOUS DAILY
Status: DISCONTINUED | OUTPATIENT
Start: 2018-05-29 | End: 2018-05-30

## 2018-05-28 RX ADMIN — HEPARIN SODIUM 5000 UNITS: 5000 INJECTION, SOLUTION INTRAVENOUS; SUBCUTANEOUS at 05:07

## 2018-05-28 RX ADMIN — LEVETIRACETAM 500 MG: 5 INJECTION INTRAVENOUS at 15:07

## 2018-05-28 RX ADMIN — SODIUM BICARBONATE: 84 INJECTION, SOLUTION INTRAVENOUS at 09:33

## 2018-05-28 RX ADMIN — SODIUM BICARBONATE: 84 INJECTION, SOLUTION INTRAVENOUS at 20:02

## 2018-05-28 RX ADMIN — LABETALOL HYDROCHLORIDE 10 MG: 5 INJECTION, SOLUTION INTRAVENOUS at 10:41

## 2018-05-28 RX ADMIN — DEXTROSE MONOHYDRATE 50 ML: 25 INJECTION, SOLUTION INTRAVENOUS at 11:33

## 2018-05-28 RX ADMIN — CEFTRIAXONE SODIUM 2 G: 2 INJECTION, POWDER, FOR SOLUTION INTRAMUSCULAR; INTRAVENOUS at 10:28

## 2018-05-28 RX ADMIN — SODIUM CHLORIDE 170 MG PE: 9 INJECTION, SOLUTION INTRAVENOUS at 23:00

## 2018-05-28 RX ADMIN — FAMOTIDINE 20 MG: 10 INJECTION, SOLUTION INTRAVENOUS at 21:23

## 2018-05-28 RX ADMIN — HEPARIN SODIUM 5000 UNITS: 5000 INJECTION, SOLUTION INTRAVENOUS; SUBCUTANEOUS at 18:16

## 2018-05-28 RX ADMIN — LEVETIRACETAM 500 MG: 5 INJECTION INTRAVENOUS at 01:30

## 2018-05-28 RX ADMIN — INSULIN ASPART 1 UNITS: 100 INJECTION, SOLUTION INTRAVENOUS; SUBCUTANEOUS at 21:23

## 2018-05-28 RX ADMIN — METHYLPREDNISOLONE SODIUM SUCCINATE 10 MG: 40 INJECTION, POWDER, FOR SOLUTION INTRAMUSCULAR; INTRAVENOUS at 15:06

## 2018-05-28 RX ADMIN — LIDOCAINE HYDROCHLORIDE 1 ML: 10 INJECTION, SOLUTION EPIDURAL; INFILTRATION; INTRACAUDAL; PERINEURAL at 09:11

## 2018-05-28 RX ADMIN — MICONAZOLE NITRATE: 2 POWDER TOPICAL at 12:49

## 2018-05-28 RX ADMIN — DEXTROSE MONOHYDRATE 50 ML: 25 INJECTION, SOLUTION INTRAVENOUS at 06:05

## 2018-05-28 RX ADMIN — INSULIN GLARGINE 20 UNITS: 100 INJECTION, SOLUTION SUBCUTANEOUS at 00:29

## 2018-05-28 RX ADMIN — SODIUM CHLORIDE 2000 MG PE: 9 INJECTION, SOLUTION INTRAVENOUS at 16:30

## 2018-05-28 NOTE — PROGRESS NOTES
Pt is placed on BIPAP 14/5 25%, mepilex under the nose, alarm volume level set at 10. RT will continue to follow.  5/27/2018  Kemi Gumzan

## 2018-05-28 NOTE — PROGRESS NOTES
Spoke with Dr. Robert, will insert midline despite cultures being negative less than 24 hours.  Patient is requiring 2 Peripheral sites and using thumb to draw blood.

## 2018-05-28 NOTE — ANESTHESIA CARE TRANSFER NOTE
Patient: Gem Jade    * No procedures listed *    Diagnosis: * No pre-op diagnosis entered *  Diagnosis Additional Information: No value filed.    Anesthesia Type:   No value filed.     Note:      Comments: Diagnosis: Venous Insufficiency  Procedure: IV Start  Ordering Physician: Psatora  Location:   Anesthesia was requested for difficult IV start. 20 gauge IV Inserted on Left lower forearm x 1 attempt.  Hep locked and secured with tegaderm and tape. Report to RN      Vitals: (Last set prior to Anesthesia Care Transfer)              Electronically Signed By: ANTHONY Brooks CRNA  May 28, 2018  1:31 AM

## 2018-05-28 NOTE — PROGRESS NOTES
Assessment and Plan:   ESRD: adm 5/25/18. S/P kidney tx 10/99. Baseline Cr 1.2 - 1.4. Hx of heavy proteinuria. Now with ROB assoc with UTI/bacteremia/sepsis. Getting D51/2 with 75 bicarb at 75 cc/h.   Cr: 1.71 > 1.50 > 1.48 > 1.63. HCO3 low at 16 with nl AG. Tacro level 5/27 13.8. Usual level 4.5 - 4.8. IV tacro has been stopped. UO yest 700 cc. Net pos 1.5 L .     On IV immunosuppressants as not taking po well: solumedrol 10 mg IV daily. Tacrolimus on hold due to high levels.     Weights (last 365 days)      Date/Time Weight [facility]     05/27/18 0600 97.5 kg (214 lb 14.4 oz)      05/26/18 1450 94.3 kg (207 lb 14.4 oz)                  Interval History:   ID: on rocephin.   Hypertension: clonidine, labetalol. Will increase doses.    Depressed MS: will decrease neurontin dose. Neuro is following.               Review of Systems:   Depressed MS. No speech, does not follow commands.           Medications:       aspirin  81 mg Oral Daily     cefTRIAXone  2 g Intravenous Q24H     cloNIDine (CATAPRES) tablet 0.1 mg  0.1 mg Oral BID     famotidine  20 mg Oral BID     gabapentin  600 mg Oral BID     heparin  5,000 Units Subcutaneous Q12H     insulin aspart  1-12 Units Subcutaneous Q4H     labetalol  150 mg Oral BID     lactobacillus rhamnosus (GG)  1 capsule Oral BID     levETIRAcetam  500 mg Intravenous Q12H     levothyroxine  50 mcg Oral Daily     loratadine  10 mg Oral Daily     methylPREDNISolone  10 mg Intravenous Q24H     pravastatin  10 mg Oral Daily     sodium chloride (PF)  10 mL Intracatheter Q8H     sodium chloride (PF)  3 mL Intracatheter Q8H       IV infusion builder WITH additives 75 mL/hr at 05/28/18 0933     - MEDICATION INSTRUCTIONS -       Current active medications and PTA medications reviewed, see medication list for details.            Physical Exam:   Vitals were reviewed  Patient Vitals for the past 24 hrs:   BP Temp Temp src Heart Rate Resp SpO2   05/28/18 0800 166/69 98.5  F (36.9  C) Oral 72  11 -   18 0636 166/64 - - - - 98 %   18 0600 155/61 - - 68 11 -   18 0500 (!) 153/91 - - - - -   18 0400 180/62 97.2  F (36.2  C) Axillary 67 14 -   18 0200 167/63 - - 67 16 96 %   18 0000 155/68 96.9  F (36.1  C) Axillary 68 18 94 %   18 2200 137/57 - - 65 14 90 %   18 2155 - 97.3  F (36.3  C) Axillary - - -   18 2000 138/50 97.5  F (36.4  C) Axillary 61 9 94 %   18 1900 (!) 123/105 - - 62 14 -   18 1800 90/53 - - 61 17 -   18 1625 - 97.9  F (36.6  C) Axillary - - -   18 1600 117/46 - - 61 13 -   18 1400 115/52 - - 62 8 -   18 1214 120/50 - - 65 14 -       Temp:  [96.9  F (36.1  C)-98.5  F (36.9  C)] 98.5  F (36.9  C)  Heart Rate:  [61-72] 72  Resp:  [8-18] 11  BP: ()/() 166/69  SpO2:  [90 %-98 %] 98 %    Temperatures:  Current - Temp: 98.5  F (36.9  C); Max - Temp  Av.6  F (36.4  C)  Min: 96.9  F (36.1  C)  Max: 98.5  F (36.9  C)  Respiration range: Resp  Av.3  Min: 8  Max: 18  Pulse range: No Data Recorded  Blood pressure range: Systolic (24hrs), Av , Min:90 , Max:180   ; Diastolic (24hrs), Av, Min:46, Max:105    Pulse oximetry range: SpO2  Av.4 %  Min: 90 %  Max: 98 %    I/O last 3 completed shifts:  In: 1768 [P.O.:268; I.V.:1500]  Out: 900 [Urine:900]      Intake/Output Summary (Last 24 hours) at 18 1056  Last data filed at 18 0800   Gross per 24 hour   Intake             1650 ml   Output             1050 ml   Net              600 ml       Opens eyes, no speech  Lungs with clear ant BS   Cor RRR nl s1 S2 no M  LE 1+ edema       Wt Readings from Last 4 Encounters:   18 97.5 kg (214 lb 14.4 oz)   18 105.9 kg (233 lb 7.5 oz)   18 102.9 kg (226 lb 14.4 oz)   17 108 kg (238 lb)          Data:          Lab Results   Component Value Date     2018     2018     2018    Lab Results   Component Value Date    CHLORIDE 116  05/28/2018    CHLORIDE 114 05/27/2018    CHLORIDE 112 05/26/2018    Lab Results   Component Value Date    BUN 49 05/28/2018    BUN 54 05/27/2018    BUN 49 05/26/2018      Lab Results   Component Value Date    POTASSIUM 4.3 05/28/2018    POTASSIUM 4.7 05/27/2018    POTASSIUM 5.0 05/26/2018    Lab Results   Component Value Date    CO2 16 05/28/2018    CO2 19 05/27/2018    CO2 19 05/26/2018    Lab Results   Component Value Date    CR 1.63 05/28/2018    CR 1.48 05/27/2018    CR 1.50 05/26/2018        Recent Labs   Lab Test  05/28/18   0705  05/28/18   0520  05/26/18   0545   WBC  6.1  4.9  6.8   HGB  10.6*  10.9*  12.0   HCT  33.2*  34.2*  36.9   MCV  93  93  93   PLT  156  Canceled, Test credited  132*     Recent Labs   Lab Test  05/28/18   0520  05/25/18   0126  01/20/18   0720   01/18/18   1903   12/20/17   1420   04/19/17   0005   09/03/13   0725   11/07/10   1250   AST  17  8  22   < >   --    < >   --    < >  24   < >  17   < >  21   ALT  14  17  20   < >   --    < >   --    < >  44   < >  27   < >  13   ALKPHOS  59  88  106   < >   --    < >   --    < >  101   < >  77   < >  94   BILITOTAL  0.3  0.5  0.4   < >   --    < >   --    < >  0.2   < >  0.3   < >  <0.1*   BILICONJ   --    --    --    --    --    --    --    --    --    --   0.0   --   0.0   NOREEN   --    --    --    --   13   --   17   --   25   < >   --    --    --     < > = values in this interval not displayed.       Recent Labs   Lab Test  01/23/18   0826  01/22/18 2120 01/22/18 0458   MAG  2.5*  2.6*  1.5*     Recent Labs   Lab Test  05/28/18   0520  01/22/18   0458  04/28/17   0719   PHOS  3.0  2.6  3.1     Recent Labs   Lab Test  05/28/18   0520  05/27/18   0525  05/26/18   0545   ROGER  8.9  8.9  8.7       Lab Results   Component Value Date    ROGER 8.9 05/28/2018     Lab Results   Component Value Date    WBC 6.1 05/28/2018    HGB 10.6 (L) 05/28/2018    HCT 33.2 (L) 05/28/2018    MCV 93 05/28/2018     05/28/2018     Lab Results    Component Value Date     05/28/2018    POTASSIUM 4.3 05/28/2018    CHLORIDE 116 (H) 05/28/2018    CO2 16 (L) 05/28/2018    GLC 54 (L) 05/28/2018     Lab Results   Component Value Date    BUN 49 (H) 05/28/2018    CR 1.63 (H) 05/28/2018     Lab Results   Component Value Date    MAG 2.5 (H) 01/23/2018     Lab Results   Component Value Date    PHOS 3.0 05/28/2018       Creatinine   Date Value Ref Range Status   05/28/2018 1.63 (H) 0.52 - 1.04 mg/dL Final   05/27/2018 1.48 (H) 0.52 - 1.04 mg/dL Final   05/26/2018 1.50 (H) 0.52 - 1.04 mg/dL Final   05/25/2018 1.71 (H) 0.52 - 1.04 mg/dL Final   05/01/2018 1.39 (H) 0.52 - 1.04 mg/dL Final   04/04/2018 1.24 (H) 0.52 - 1.04 mg/dL Final       Attestation:  I have reviewed today's vital signs, notes, medications, labs and imaging.     Wang Noble MD

## 2018-05-28 NOTE — PROGRESS NOTES
I reviewed EEG at 5:30 pm- there are active SZs. There has been some improvement in a last hour with spikes present but no generalized epileptiform discharges.     I reviewed All neurology notes again - I will plan to increase keppra to 750 mg PO BID with Keppra level tomorrow.    I still think pt. Should have brain MRI.    If SZs will not improve - we may consider PHB coma. I such case, I will consult neurocritical care service.

## 2018-05-28 NOTE — PROGRESS NOTES
Started EEG #  [id number]  day1 at 15:57.   EEG was ordered by Dr. Donovan Almanza when pt was in rm [id number].  She has been transferred to  to accomodate 24 hr. Video EEG monitoring.  Pt seen with ongoing rt arm tremoring/shaking and some mild right facial twitching.

## 2018-05-28 NOTE — PLAN OF CARE
Problem: Urinary Tract Infection (Adult)  Goal: Signs and Symptoms of Listed Potential Problems Will be Absent, Minimized or Managed (Urinary Tract Infection)  Signs and symptoms of listed potential problems will be absent, minimized or managed by discharge/transition of care (reference Urinary Tract Infection (Adult) CPG).   Outcome: Declining  Arrived from CCU around 1600, has been obtunded and non verbal. PERRL, pupils are very sluggish. Administered IV fosphenytoin. Having episodes of apnea while sleeping, placed bi-pap on  cpap mode. Shortly after began having seizure like episodes. Writer witnessed 5 episodes that would last about 20 seconds 5-10 minutes apart, pt would seem to be bearing down repetitively and look like she was going to vomit, along with rhythmic mouth movement, foaming at the mouth requiring oral suction. HR would go bradycardic from in 70's down to 48. Notified Dr. Choudhury of seizure like episodes and intensivist of airway concerns. Skin is bruised, dry, groin skin folds are red and excoriated. Johnson in place, good UOP.  was here and updated on plan of care.

## 2018-05-28 NOTE — PROGRESS NOTES
Jeff's test performed prior to radial ABG draw. Collateral circulation confirmed.     Ty Toribio RT.

## 2018-05-28 NOTE — PLAN OF CARE
Problem: Patient Care Overview  Goal: Plan of Care/Patient Progress Review  Outcome: No Change  Patient is lethargic and today has not opened her eye and is not able to verbalize orientation. She is occasionally able to follow a command but otherwise disoriented. Tele SR with rates in the 60's. Lungs clear, +1-2 LE edema. Bottom and groin are highly excoriated, Mycostatin applied, mepilex applied to open area. Blood glucose today have been 50's-170's, IV push dextrose was given x 2 and she is on IV drip with dextrose. Turned and repositioned every 2 hrs. Plan for EEG and transfer to ICU.

## 2018-05-28 NOTE — PROGRESS NOTES
[facility]  Neuroscience and Spine [place]  Neurology Daily Note     10/6/11 2:09 PM        Assessment and Plan:     UTI (urinary tract infection)  Pt. profopund encephalopathy - she has UTI, RF, underlying renal transplant and previous abnormal brain MRI in 1-2018.    I rec to obtain brain MRI r/ou strokes and infection, no contast will be given due to RF.  EEG is pending upon transfer to station 77.    If brain MRI and EEG are not revealing - we will consider CSF exam tomorrow to look for cryptococcal meningitis give immuno-supression.    Dr. Choudhury just called me - there is EEG SZ activity - I will be loading pt. With Phosphenytoin and transferring her to ICU.    I called pt`s  and updated him          Interval History:   Chart is reveiwed - there is some more obtundation today. EEG is still pending.           Review of Systems:   No improvement in MS          Medications:          aspirin  81 mg Oral Daily     cefTRIAXone  2 g Intravenous Q24H     cloNIDine (CATAPRES) tablet 0.2 mg  0.2 mg Oral BID     [START ON 5/29/2018] famotidine  20 mg Oral Daily     gabapentin  200 mg Oral BID     heparin  5,000 Units Subcutaneous Q12H     insulin aspart  1-12 Units Subcutaneous Q4H     labetalol  150 mg Oral BID     lactobacillus rhamnosus (GG)  1 capsule Oral BID     levETIRAcetam  500 mg Intravenous Q12H     levothyroxine  50 mcg Oral Daily     methylPREDNISolone  10 mg Intravenous Q24H     pravastatin  10 mg Oral Daily     sodium chloride (PF)  10 mL Intracatheter Q8H     sodium chloride (PF)  3 mL Intracatheter Q8H          Physical Exam:   Vitals: Blood pressure 171/77, pulse 71, temperature 98.5  F (36.9  C), temperature source Oral, resp. rate 12, weight 101.9 kg (224 lb 9.6 oz), SpO2 98 %, not currently breastfeeding.  General Appearance:  Pt. Is stuporous. Her eyes are opened but she does not follow commands, she does not sqeeze the hands, does not move spontaneusly.  Neuro:       Mental  Status Exam:  stuporous       Cranial Nerves: eyes opened , gaze is conjugate, symmetrical face           Motor: limp muscle tone in LE, nl in UEs         Reflexes:        Sensory: no WD to tickling of toes                  Coordination:  Can not be tested       Gait:            Data:     Results for orders placed or performed during the hospital encounter of 05/25/18 (from the past 24 hour(s))   Glucose by meter   Result Value Ref Range    Glucose 116 (H) 70 - 99 mg/dL   Glucose by meter   Result Value Ref Range    Glucose 111 (H) 70 - 99 mg/dL   Glucose by meter   Result Value Ref Range    Glucose 94 70 - 99 mg/dL   Comprehensive metabolic panel   Result Value Ref Range    Sodium 142 133 - 144 mmol/L    Potassium 4.3 3.4 - 5.3 mmol/L    Chloride 116 (H) 94 - 109 mmol/L    Carbon Dioxide 16 (L) 20 - 32 mmol/L    Anion Gap 10 3 - 14 mmol/L    Glucose 54 (L) 70 - 99 mg/dL    Urea Nitrogen 49 (H) 7 - 30 mg/dL    Creatinine 1.63 (H) 0.52 - 1.04 mg/dL    GFR Estimate 31 (L) >60 mL/min/1.7m2    GFR Estimate If Black 38 (L) >60 mL/min/1.7m2    Calcium 8.9 8.5 - 10.1 mg/dL    Bilirubin Total 0.3 0.2 - 1.3 mg/dL    Albumin 2.4 (L) 3.4 - 5.0 g/dL    Protein Total 6.9 6.8 - 8.8 g/dL    Alkaline Phosphatase 59 40 - 150 U/L    ALT 14 0 - 50 U/L    AST 17 0 - 45 U/L   Phosphorus   Result Value Ref Range    Phosphorus 3.0 2.5 - 4.5 mg/dL   Tacrolimus level   Result Value Ref Range    Tacrolimus Level Canceled, Test credited 5.0 - 15.0 ug/L   CBC with platelets   Result Value Ref Range    WBC 4.9 4.0 - 11.0 10e9/L    RBC Count 3.67 (L) 3.8 - 5.2 10e12/L    Hemoglobin 10.9 (L) 11.7 - 15.7 g/dL    Hematocrit 34.2 (L) 35.0 - 47.0 %    MCV 93 78 - 100 fl    MCH 29.7 26.5 - 33.0 pg    MCHC 31.9 31.5 - 36.5 g/dL    RDW 13.3 10.0 - 15.0 %    Platelet Count Canceled, Test credited 150 - 450 10e9/L   Glucose by meter   Result Value Ref Range    Glucose 54 (L) 70 - 99 mg/dL   Glucose by meter   Result Value Ref Range    Glucose 147 (H) 70 -  99 mg/dL   CBC with platelets   Result Value Ref Range    WBC 6.1 4.0 - 11.0 10e9/L    RBC Count 3.58 (L) 3.8 - 5.2 10e12/L    Hemoglobin 10.6 (L) 11.7 - 15.7 g/dL    Hematocrit 33.2 (L) 35.0 - 47.0 %    MCV 93 78 - 100 fl    MCH 29.6 26.5 - 33.0 pg    MCHC 31.9 31.5 - 36.5 g/dL    RDW 13.4 10.0 - 15.0 %    Platelet Count 156 150 - 450 10e9/L   Glucose by meter   Result Value Ref Range    Glucose 94 70 - 99 mg/dL   Glucose by meter   Result Value Ref Range    Glucose 59 (L) 70 - 99 mg/dL   Glucose by meter   Result Value Ref Range    Glucose 170 (H) 70 - 99 mg/dL     *Note: Due to a large number of results and/or encounters for the requested time period, some results have not been displayed. A complete set of results can be found in Results Review.

## 2018-05-28 NOTE — PLAN OF CARE
Problem: Patient Care Overview  Goal: Plan of Care/Patient Progress Review  Outcome: No Change  VSS and WNL for this pt. Unable to assess orientation as pt non responsive to staff. Pt too obtunded to take medication at this time, MD contacted to have meds changed to IV. Sitter started at 0100 due to pt pulling at lines and losing IV access. Difficult to start IV however, IV restarted. Around 0530, pts BS was 59 and D50 given via IV. Hospitalist contacted and D5 added to pts IV maintainace fluid. Will pass on and continue to monitor.

## 2018-05-28 NOTE — PROGRESS NOTES
RT called to fix pt Bipap mask. Pt on room air, SpO2 95%. RR 12-16, BBS clear. RT attempted to place pt back on Bipap, pt immediately took mask off. Spoke to RN, advised pt refusing Bipap. Patient spouse brought pt home nasal mask, advised not compatible with hospital machine.  Advised if mask is an issue, spouse can bring in pt home Bipap for use. RN will speak to spouse, will call RT if Bipap needed later tonight.     Dimitry Berrios RT

## 2018-05-28 NOTE — PROGRESS NOTES
[facility]    Hospitalist Progress Note    Assessment & Plan    72 year old female with multiple chronic medical problems admitted with aggressive change in mental state for the last 72 hours or so.  She has had a history of recurrent admissions for encephalopathy.     Sepsis due to E. coli bacteremia and Urinary tract infection.    -patient with worsening encephalopathy the last 3 days prior to presentation  -Blood culture and urine culture is positive for E. Coli  -Continue  Rocephin(initially on cefepime)     Acute metabolic encephalopathy.    -Likely multifactorial   -Initial head CT-negative for acute findings  -TSH within normal limits  -Patient has had problems with recurrent encephalopathies  -Keppra level high at 95, restarted on 500 mg twice daily(50% of home dose)  -After initial improvement, patient has again had a decline in mental state, more encephalopathic throughout the day yesterday and today.  -Discussed with Dr. Choudhury, neurology  -We will transfer to the seventh floor for continuous EEG monitoring    Acute on chronic hypoxic and hypercapnic respiratory failure.    -VBG demonstrated both hypercapnia and hypoxia.    -This was more than likely due to her encephalopathy probably has obesity hypoventilation too  -Gas exchange much improved, oxygen weaned off to room air    Acute on chronic kidney disease-III  History of kidney transplant.    -The patient is on tacrolimus and prednisone.    -tacrolimus level were supra- therapeutic at presentation  -Initially placed on IV tacrolimus however now on hold, being managed by nephrology  -Creatinine at presentation 1.71, 1.63 today; on bicarbonate drip due to worsening acidosis.  -Continue IV fluids per nephrology.     Diabetes mellitus type 2.  -Hemoglobin A1c 8.8   -sliding-scale insulin  -Patient hypoglycemic this morning with blood glucose of 54, now on a dextrose drip  -Almost negligible p.o. intake yesterday evening  -Discontinue  Lantus for now  -Pending her ability to take p.o., will adjust insulin regimen accordingly      Hypothyroidism.    -Continue levothyroxine.    -TSH within normal limits     Benign essential hypertension.   -PTA on clonidine and labetalol.    -Continue clonidine and p.o. labetalol when able to take p.o.  -IV hydralazine and labetalol as needed to be given for systolics greater than 180.     Hyperlipidemia.    -Resume pravastatin once able to take PO       # Pain Assessment:  Current Pain Score 5/28/2018   Patient currently in pain? sleeping: patient not able to self report   Pain score (0-10) -   Pain location -   Pain descriptors -   rFLACC pain score 6   CPOT pain score -   - Gem denies pain and is not receiving pain medication.        D/W: RN  DVT Prophylaxis: Pneumatic Compression Devices  Code Status: Full Code    Disposition: Expected discharge 2+ days    Catrachito Robert MD    Interval History   Continues to be more somnolent and drowsy.  Afebrile.  Unable to take much p.o.  Hypoglycemic this morning.    -Data reviewed today: I reviewed all new labs and imaging results over the last 24 hours. I personally reviewed no images or EKG's today.      Physical Exam   Temp: 98.5  F (36.9  C) Temp src: Oral BP: 131/76   Heart Rate: 69 Resp: 10 SpO2: 98 % O2 Device: None (Room air)    Vitals:    05/26/18 1450 05/27/18 0600   Weight: 94.3 kg (207 lb 14.4 oz) 97.5 kg (214 lb 14.4 oz)     Vital Signs with Ranges  Temp:  [96.9  F (36.1  C)-98.5  F (36.9  C)] 98.5  F (36.9  C)  Heart Rate:  [61-72] 69  Resp:  [8-18] 10  BP: ()/() 131/76  SpO2:  [90 %-98 %] 98 %  I/O last 3 completed shifts:  In: 1768 [P.O.:268; I.V.:1500]  Out: 900 [Urine:900]    Constitutional: Awake, opens eyes to verbal cues however not following commands at all.   HEENT: Moist oral mucosa  Neck- Supple, Good ROM, No JVD  Respiratory:  CTA B/L, Normal WOB  Cardiovascular: RRR, No murmur  GI: Soft, Non- tender, BS-  normoactive  Skin/Integument: Warm and dry, no rashes  MSK: No joint deformity or swelling, no edema  Neuro: ?  Bilateral upper extremity tremors versus shaking    Medications       IV infusion builder WITH additives 100 mL/hr at 05/28/18 1138     - MEDICATION INSTRUCTIONS -           aspirin  81 mg Oral Daily     cefTRIAXone  2 g Intravenous Q24H     cloNIDine (CATAPRES) tablet 0.2 mg  0.2 mg Oral BID     [START ON 5/29/2018] famotidine  20 mg Oral Daily     gabapentin  200 mg Oral BID     heparin  5,000 Units Subcutaneous Q12H     insulin aspart  1-12 Units Subcutaneous Q4H     labetalol  150 mg Oral BID     lactobacillus rhamnosus (GG)  1 capsule Oral BID     levETIRAcetam  500 mg Intravenous Q12H     levothyroxine  50 mcg Oral Daily     methylPREDNISolone  10 mg Intravenous Q24H     pravastatin  10 mg Oral Daily     sodium chloride (PF)  10 mL Intracatheter Q8H     sodium chloride (PF)  3 mL Intracatheter Q8H       Data       Recent Labs  Lab 05/28/18  0705 05/28/18  0520 05/27/18  0525 05/26/18  0545  05/25/18  0126   WBC 6.1 4.9  --  6.8  --  9.1   HGB 10.6* 10.9*  --  12.0  --  12.9   MCV 93 93  --  93  --  92    Canceled, Test credited  --  132*  < > 163   NA  --  142 140 141  --  133   POTASSIUM  --  4.3 4.7 5.0  --  4.8   CHLORIDE  --  116* 114* 112*  --  100   CO2  --  16* 19* 19*  --  24   BUN  --  49* 54* 49*  --  49*   CR  --  1.63* 1.48* 1.50*  --  1.71*   ANIONGAP  --  10 7 10  --  9   ROGER  --  8.9 8.9 8.7  --  9.7   GLC  --  54* 163* 195*  --  251*   ALBUMIN  --  2.4*  --   --   --  3.0*   PROTTOTAL  --  6.9  --   --   --  8.6   BILITOTAL  --  0.3  --   --   --  0.5   ALKPHOS  --  59  --   --   --  88   ALT  --  14  --   --   --  17   AST  --  17  --   --   --  8   < > = values in this interval not displayed.    No results found for this or any previous visit (from the past 24 hour(s)).

## 2018-05-29 LAB
AMMONIA PLAS-SCNC: <10 UMOL/L (ref 10–50)
ANION GAP SERPL CALCULATED.3IONS-SCNC: 9 MMOL/L (ref 3–14)
BUN SERPL-MCNC: 35 MG/DL (ref 7–30)
CALCIUM SERPL-MCNC: 8.9 MG/DL (ref 8.5–10.1)
CHLORIDE SERPL-SCNC: 111 MMOL/L (ref 94–109)
CO2 SERPL-SCNC: 23 MMOL/L (ref 20–32)
CREAT SERPL-MCNC: 1.39 MG/DL (ref 0.52–1.04)
ERYTHROCYTE [DISTWIDTH] IN BLOOD BY AUTOMATED COUNT: 13.3 % (ref 10–15)
GFR SERPL CREATININE-BSD FRML MDRD: 37 ML/MIN/1.7M2
GLUCOSE BLDC GLUCOMTR-MCNC: 101 MG/DL (ref 70–99)
GLUCOSE BLDC GLUCOMTR-MCNC: 148 MG/DL (ref 70–99)
GLUCOSE BLDC GLUCOMTR-MCNC: 152 MG/DL (ref 70–99)
GLUCOSE BLDC GLUCOMTR-MCNC: 171 MG/DL (ref 70–99)
GLUCOSE BLDC GLUCOMTR-MCNC: 175 MG/DL (ref 70–99)
GLUCOSE BLDC GLUCOMTR-MCNC: 182 MG/DL (ref 70–99)
GLUCOSE SERPL-MCNC: 160 MG/DL (ref 70–99)
HCT VFR BLD AUTO: 38 % (ref 35–47)
HGB BLD-MCNC: 12.3 G/DL (ref 11.7–15.7)
MAGNESIUM SERPL-MCNC: 1.8 MG/DL (ref 1.6–2.3)
MCH RBC QN AUTO: 29.6 PG (ref 26.5–33)
MCHC RBC AUTO-ENTMCNC: 32.4 G/DL (ref 31.5–36.5)
MCV RBC AUTO: 92 FL (ref 78–100)
PHENYTOIN SERPL-MCNC: 19.4 MG/L (ref 10–20)
PHOSPHATE SERPL-MCNC: 3 MG/DL (ref 2.5–4.5)
PLATELET # BLD AUTO: 151 10E9/L (ref 150–450)
POTASSIUM SERPL-SCNC: 4 MMOL/L (ref 3.4–5.3)
RBC # BLD AUTO: 4.15 10E12/L (ref 3.8–5.2)
SODIUM SERPL-SCNC: 143 MMOL/L (ref 133–144)
TSH SERPL DL<=0.005 MIU/L-ACNC: 0.85 MU/L (ref 0.4–4)
URATE SERPL-MCNC: 7.3 MG/DL (ref 2.6–6)
VIT B12 SERPL-MCNC: 1383 PG/ML (ref 193–986)
WBC # BLD AUTO: 5 10E9/L (ref 4–11)

## 2018-05-29 PROCEDURE — 20000003 ZZH R&B ICU

## 2018-05-29 PROCEDURE — 25000128 H RX IP 250 OP 636: Performed by: INTERNAL MEDICINE

## 2018-05-29 PROCEDURE — 80185 ASSAY OF PHENYTOIN TOTAL: CPT | Performed by: INTERNAL MEDICINE

## 2018-05-29 PROCEDURE — 36415 COLL VENOUS BLD VENIPUNCTURE: CPT | Performed by: INTERNAL MEDICINE

## 2018-05-29 PROCEDURE — 84100 ASSAY OF PHOSPHORUS: CPT | Performed by: INTERNAL MEDICINE

## 2018-05-29 PROCEDURE — 25000125 ZZHC RX 250: Performed by: HOSPITALIST

## 2018-05-29 PROCEDURE — 84443 ASSAY THYROID STIM HORMONE: CPT | Performed by: INTERNAL MEDICINE

## 2018-05-29 PROCEDURE — 93005 ELECTROCARDIOGRAM TRACING: CPT

## 2018-05-29 PROCEDURE — 40000275 ZZH STATISTIC RCP TIME EA 10 MIN

## 2018-05-29 PROCEDURE — 99291 CRITICAL CARE FIRST HOUR: CPT | Performed by: PSYCHIATRY & NEUROLOGY

## 2018-05-29 PROCEDURE — 25000128 H RX IP 250 OP 636: Performed by: PSYCHIATRY & NEUROLOGY

## 2018-05-29 PROCEDURE — 80177 DRUG SCRN QUAN LEVETIRACETAM: CPT | Performed by: INTERNAL MEDICINE

## 2018-05-29 PROCEDURE — 25800025 ZZH RX 258: Performed by: INTERNAL MEDICINE

## 2018-05-29 PROCEDURE — 99233 SBSQ HOSP IP/OBS HIGH 50: CPT | Performed by: INTERNAL MEDICINE

## 2018-05-29 PROCEDURE — 40000239 ZZH STATISTIC VAT ROUNDS

## 2018-05-29 PROCEDURE — 00000146 ZZHCL STATISTIC GLUCOSE BY METER IP

## 2018-05-29 PROCEDURE — 82140 ASSAY OF AMMONIA: CPT | Performed by: INTERNAL MEDICINE

## 2018-05-29 PROCEDURE — G0463 HOSPITAL OUTPT CLINIC VISIT: HCPCS

## 2018-05-29 PROCEDURE — 82607 VITAMIN B-12: CPT | Performed by: INTERNAL MEDICINE

## 2018-05-29 PROCEDURE — 93010 ELECTROCARDIOGRAM REPORT: CPT | Performed by: INTERNAL MEDICINE

## 2018-05-29 PROCEDURE — 83735 ASSAY OF MAGNESIUM: CPT | Performed by: INTERNAL MEDICINE

## 2018-05-29 PROCEDURE — 84550 ASSAY OF BLOOD/URIC ACID: CPT | Performed by: INTERNAL MEDICINE

## 2018-05-29 PROCEDURE — 80048 BASIC METABOLIC PNL TOTAL CA: CPT | Performed by: INTERNAL MEDICINE

## 2018-05-29 PROCEDURE — 95951 ZZHC EEG VIDEO EACH 24 HR: CPT

## 2018-05-29 PROCEDURE — 85027 COMPLETE CBC AUTOMATED: CPT | Performed by: INTERNAL MEDICINE

## 2018-05-29 RX ADMIN — FAMOTIDINE 20 MG: 10 INJECTION, SOLUTION INTRAVENOUS at 22:46

## 2018-05-29 RX ADMIN — SODIUM CHLORIDE 170 MG PE: 9 INJECTION, SOLUTION INTRAVENOUS at 06:04

## 2018-05-29 RX ADMIN — SODIUM BICARBONATE: 84 INJECTION, SOLUTION INTRAVENOUS at 20:16

## 2018-05-29 RX ADMIN — INSULIN ASPART 1 UNITS: 100 INJECTION, SOLUTION INTRAVENOUS; SUBCUTANEOUS at 09:18

## 2018-05-29 RX ADMIN — METHYLPREDNISOLONE SODIUM SUCCINATE 10 MG: 40 INJECTION, POWDER, FOR SOLUTION INTRAMUSCULAR; INTRAVENOUS at 16:35

## 2018-05-29 RX ADMIN — HEPARIN SODIUM 5000 UNITS: 5000 INJECTION, SOLUTION INTRAVENOUS; SUBCUTANEOUS at 17:57

## 2018-05-29 RX ADMIN — INSULIN ASPART 1 UNITS: 100 INJECTION, SOLUTION INTRAVENOUS; SUBCUTANEOUS at 22:53

## 2018-05-29 RX ADMIN — INSULIN ASPART 2 UNITS: 100 INJECTION, SOLUTION INTRAVENOUS; SUBCUTANEOUS at 14:55

## 2018-05-29 RX ADMIN — METOPROLOL TARTRATE 5 MG: 5 INJECTION, SOLUTION INTRAVENOUS at 05:05

## 2018-05-29 RX ADMIN — HEPARIN SODIUM 5000 UNITS: 5000 INJECTION, SOLUTION INTRAVENOUS; SUBCUTANEOUS at 05:05

## 2018-05-29 RX ADMIN — INSULIN ASPART 2 UNITS: 100 INJECTION, SOLUTION INTRAVENOUS; SUBCUTANEOUS at 05:12

## 2018-05-29 RX ADMIN — LEVETIRACETAM 750 MG: 100 INJECTION, SOLUTION INTRAVENOUS at 01:45

## 2018-05-29 RX ADMIN — METOPROLOL TARTRATE 5 MG: 5 INJECTION, SOLUTION INTRAVENOUS at 16:35

## 2018-05-29 RX ADMIN — INSULIN ASPART 2 UNITS: 100 INJECTION, SOLUTION INTRAVENOUS; SUBCUTANEOUS at 01:52

## 2018-05-29 RX ADMIN — METOPROLOL TARTRATE 5 MG: 5 INJECTION, SOLUTION INTRAVENOUS at 22:46

## 2018-05-29 RX ADMIN — LEVOTHYROXINE SODIUM ANHYDROUS 25 MCG: 100 INJECTION, POWDER, LYOPHILIZED, FOR SOLUTION INTRAVENOUS at 08:56

## 2018-05-29 RX ADMIN — LEVETIRACETAM 750 MG: 100 INJECTION, SOLUTION INTRAVENOUS at 14:49

## 2018-05-29 RX ADMIN — CEFTRIAXONE SODIUM 2 G: 2 INJECTION, POWDER, FOR SOLUTION INTRAMUSCULAR; INTRAVENOUS at 08:55

## 2018-05-29 RX ADMIN — SODIUM BICARBONATE: 84 INJECTION, SOLUTION INTRAVENOUS at 08:56

## 2018-05-29 RX ADMIN — HYDRALAZINE HYDROCHLORIDE 20 MG: 20 INJECTION INTRAMUSCULAR; INTRAVENOUS at 06:04

## 2018-05-29 RX ADMIN — SODIUM BICARBONATE: 84 INJECTION, SOLUTION INTRAVENOUS at 22:46

## 2018-05-29 RX ADMIN — TACROLIMUS 30 MCG/HR: 5 INJECTION, SOLUTION INTRAVENOUS at 09:06

## 2018-05-29 NOTE — PROGRESS NOTES
Pt stable on 3 lpm nc all shift. Continuous EEG running. Will continue to follow.     Dimitry Berrios RT

## 2018-05-29 NOTE — PROGRESS NOTES
[facility]  Neuroscience Intensive Care Progress Note  2018    Gem Jade is a 72 year old year old female admitted on 2018 with sepsis due to E. coli bacteremia along with alteration in mental status 3 days prior to presentation.    Problem List  1.  Metabolic encephalopathy  2.  Sepsis due to E. coli bacteremia, resolving  3.  Acute on chronic hypoxic and hypercapnic respiratory failure, improving  4.  Kidney transplant, on tacrolimus  5.  Diabetes mellitus, A1c 8.8  6.  Hypothyroidism    24 hour events:  EEG has been running for 24 hours with no sign of epileptic activity or concern for seizures.  Was loaded on fosphenytoin yesterday.  Concern from pharmacy regarding interaction of fosphenytoin with tacrolimus causing decreased tacrolimus levels.    24 Hour Vital Signs Summary:  Temperatures:  Current - Temp: 97.8  F (36.6  C); Max - Temp  Av.2  F (36.8  C)  Min: 97.8  F (36.6  C)  Max: 98.8  F (37.1  C)  Respiration range: Resp  Av.9  Min: 7  Max: 31  Pulse range: No Data Recorded  Blood pressure range: Systolic (24hrs), Av , Min:80 , Max:185   ; Diastolic (24hrs), Av, Min:31, Max:92    Pulse oximetry range: SpO2  Av.5 %  Min: 83 %  Max: 100 %    Ventilator Settings  Resp: 10      Intake/Output Summary (Last 24 hours) at 18 1450  Last data filed at 18 1400   Gross per 24 hour   Intake          2604.35 ml   Output             4015 ml   Net         -1410.65 ml       Current Medications:    cefTRIAXone  2 g Intravenous Q24H     famotidine  20 mg Intravenous Q24H     heparin  5,000 Units Subcutaneous Q12H     insulin aspart  1-12 Units Subcutaneous Q4H     levETIRAcetam  750 mg Intravenous Q12H     levothyroxine  25 mcg Intravenous Daily     methylPREDNISolone  10 mg Intravenous Q24H     metoprolol  5 mg Intravenous Q6H     sodium chloride (PF)  10 mL Intracatheter Q8H     sodium chloride (PF)  3 mL Intracatheter Q8H       PRN  Medications:  acetaminophen (TYLENOL) tablet 325-650 mg, acetaminophen, bisacodyl, glucose **OR** dextrose **OR** glucagon, hydrALAZINE, hypromellose-dextran, labetalol, lidocaine 4%, lidocaine (buffered or not buffered), lidocaine (buffered or not buffered), magnesium sulfate, miconazole, naloxone, - MEDICATION INSTRUCTIONS -, ondansetron **OR** ondansetron, polyethylene glycol, potassium chloride, potassium chloride with lidocaine, potassium chloride, potassium chloride, potassium chloride, prochlorperazine **OR** prochlorperazine **OR** prochlorperazine, senna-docusate **OR** senna-docusate, sodium chloride (PF), sodium chloride (PF)    Infusions:    IV infusion builder WITH additives 100 mL/hr at 05/29/18 0856     - MEDICATION INSTRUCTIONS -       tacrolimus (Prograf) infusion ADULT 30 mcg/hr (05/29/18 0906)       Allergies   Allergen Reactions     Bactrim Ds [Sulfamethoxazole W/Trimethoprim]      Creatinine level increased     Atorvastatin Calcium      myalgias, muscle aches     Codeine Nausea and Vomiting     Darvocet [Propoxyphene N-Apap] Nausea and Vomiting     Hydromorphone      Levofloxacin Other (See Comments) and Diarrhea     hallucinations     Morphine      Nausea and vomiting     Niaspan [Niacin] GI Disturbance     Flushing even at low dose, GI upset     Percocet [Oxycodone-Acetaminophen]      Vomiting after taking med couple of times     Vicodin [Hydrocodone-Acetaminophen] Nausea and Vomiting       Physical Examination:  /44  Pulse 71  Temp 97.8  F (36.6  C) (Axillary)  Resp 10  Wt 101 kg (222 lb 10.6 oz)  SpO2 100%  BMI 35.94 kg/m2  GENERAL APPEARANCE ADULT: Lying in bed, no apparent distress    Neurological Examination  MENTAL STATE:  Pupils equal round reactive to light, eye movements appear full, face appears symmetric, hearing intact to voice    MOTOR:  Squeezes hands bilaterally to command and will wiggle toes bilaterally.  No overt movement in bilateral lower extremities other than  wiggling of toes.  Will raise arms up to scratch her head bilaterally, but not to command.  REFLEXES:  Hyporeflexive throughout   SENSORY:  Grimace to noxious stimuli in all 4 extremities.  COORDINATION:   Unable to perform due to patient inability to fully cooperate with exam  GAIT:  Bedbound at baseline, gait deferred    HENT: Mouth and posterior oropharynx normal, moist mucous membranes  RESP: lungs clear to auscultation   CV: regular rate and rhythm, no murmur, rub, or gallop  ABDOMEN: normal bowel sounds, soft, nontender, no hepatosplenomegaly or other masses    Labs/Studies:  CMP  Recent Labs  Lab 05/29/18 0440 05/28/18  0520 05/27/18  0525 05/26/18  0545 05/25/18  0126    142 140 141 133   POTASSIUM 4.0 4.3 4.7 5.0 4.8   CHLORIDE 111* 116* 114* 112* 100   CO2 23 16* 19* 19* 24   ANIONGAP 9 10 7 10 9   * 54* 163* 195* 251*   BUN 35* 49* 54* 49* 49*   CR 1.39* 1.63* 1.48* 1.50* 1.71*   GFRESTIMATED 37* 31* 35* 34* 29*   GFRESTBLACK 45* 38* 42* 41* 36*   ROGER 8.9 8.9 8.9 8.7 9.7   MAG 1.8  --   --   --   --    PHOS 3.0 3.0  --   --   --    PROTTOTAL  --  6.9  --   --  8.6   ALBUMIN  --  2.4*  --   --  3.0*   BILITOTAL  --  0.3  --   --  0.5   ALKPHOS  --  59  --   --  88   AST  --  17  --   --  8   ALT  --  14  --   --  17     CBC  Recent Labs  Lab 05/29/18 0440 05/28/18  0705 05/28/18  0520 05/26/18  0545   WBC 5.0 6.1 4.9 6.8   RBC 4.15 3.58* 3.67* 3.99   HGB 12.3 10.6* 10.9* 12.0   HCT 38.0 33.2* 34.2* 36.9   MCV 92 93 93 93   MCH 29.6 29.6 29.7 30.1   MCHC 32.4 31.9 31.9 32.5   RDW 13.3 13.4 13.3 13.1    156 Canceled, Test credited 132*     INRNo lab results found in last 7 days.  Arterial Blood Gas  Recent Labs  Lab 05/28/18  1756 05/25/18  0817   PH 7.35 7.33*   PCO2 37 39   PO2 145* 145*   HCO3 20* 21   O2PER 3 LPM 30%       Imaging:   MRI without suspicious lesions    EEG:  No seizures since beginning of recording    Assessment/Plan  Neuro:  Metabolic encephalopathy with no sign of  seizure activity on EEG.  Has been loaded with fosphenytoin that may interact with her tacrolimus.  We will stop the fosphenytoin allow the level to drift down on its own.  We will continue the EEG while this drifts downward to monitor for signs of any seizure activity.  Given her encephalopathy and recent infection we will also obtain an LP to evaluate for any sign of CNS infection.  Her mental status actually seems to be improving as compared to yesterday.  Continue on the dose of 750 mg twice daily of Keppra.    Code: Full Code    Miguelito Choudhury MD  Vascular Neurology/Neurocritical Care  Text Page - [phone]      Neurocritical care time:  I spent 35 minutes bedside and on the inpatient unit today managing the neurocritical care of Gem Jade in relation to the issues listed in this note. Patient is critically ill / has very high risk of deterioration

## 2018-05-29 NOTE — PROGRESS NOTES
Called by nursing in regards to possible meningitis on differential from Neurology, droplet isolation order submitted.

## 2018-05-29 NOTE — PROGRESS NOTES
Pt had Heparin SQ at 505 am. Need 12 hr hold for Lumbar puncture. ICU RN aware. Talked with xray, they will see if someone available after 1700.

## 2018-05-29 NOTE — PLAN OF CARE
Problem: ARDS (Acute Resp Distress Syndrome) (Adult)  Goal: Signs and Symptoms of Listed Potential Problems Will be Absent, Minimized or Managed (ARDS)  Signs and symptoms of listed potential problems will be absent, minimized or managed by discharge/transition of care (reference ARDS (Acute Resp Distress Syndrome) (Adult) CPG).   Outcome: No Change  [facility]   Intensive Care Unit   Nursing Note    Vital signs stable during the day.  PERRL, sluggish.  Moves all extremities, but moving Right arm the most and pulling on EEG wires.  Right wrist restaint applied today.Follows commands at times. Pt on 3 liters NC. Planning for  LP in XR tomorrow at 8 am. Hold 6 am SQ Heparin dose.  Labs noted.  Chang updated x 2 via phone.  Continue to monitor closely.      ROUTINE IP LABS (Last four results)  BMP  Recent Labs  Lab 05/29/18  0440 05/28/18  0520 05/27/18  0525 05/26/18  0545    142 140 141   POTASSIUM 4.0 4.3 4.7 5.0   CHLORIDE 111* 116* 114* 112*   ROGER 8.9 8.9 8.9 8.7   CO2 23 16* 19* 19*   BUN 35* 49* 54* 49*   CR 1.39* 1.63* 1.48* 1.50*   * 54* 163* 195*     CBC  Recent Labs  Lab 05/29/18  0440 05/28/18  0705 05/28/18  0520 05/26/18  0545   WBC 5.0 6.1 4.9 6.8   RBC 4.15 3.58* 3.67* 3.99   HGB 12.3 10.6* 10.9* 12.0   HCT 38.0 33.2* 34.2* 36.9   MCV 92 93 93 93   MCH 29.6 29.6 29.7 30.1   MCHC 32.4 31.9 31.9 32.5   RDW 13.3 13.4 13.3 13.1    156 Canceled, Test credited 132*     Grayson Ware RN

## 2018-05-29 NOTE — PROGRESS NOTES
ICU Multi-Disciplinary Note  Patient condition reviewed and discussed while on multidisciplinary rounds today.   72 yr old here with Sepsis with bacteremia 2nd to pan sensitive UTI. Hx of renal transplant and DM.  VSS. Encephapathic, BG stable.   The Critical Care service will continue to follow peripherally while patient is within the ICU. We are readily available should issues arise.  Please feel free to contact us for critical care issues with which we may be of assistance. For all other concerns, please contact primary service first.     Vianca Raines

## 2018-05-29 NOTE — PROGRESS NOTES
[facility] Nurse Inpatient Skin Assessment     Initial Assessment of:   Panus folds, bilateral groin folds        Data:   Patient History:      per MD note(s):  72 year old female with multiple chronic medical problems admitted with aggressive change in mental state for the last 72 hours or so.  She has had a history of recurrent admissions for encephalopathy     Albino Assessment and sub scores:   Albino Score  Av  Min: 11  Max: 14    Positioning: Pillows,     Mattress:  Standard , Low air loss mattress    Moisture Management:  Urinary Catheter    Catheter secured? Yes    Current Diet / Nutrition:           Active Diet Order      Full Liquid Diet        Labs:   Recent Labs   Lab Test  18   0440   18   0520  18   0545   17   1652   17   1243   ALBUMIN   --    --   2.4*   --    < >  3.1*   < >  3.0*   HGB  12.3   < >  10.9*  12.0   < >  12.7   < >  10.9*   INR   --    --    --    --    --   1.09   < >  1.01   WBC  5.0   < >  4.9  6.8   < >  13.5*   < >  5.0   A1C   --    --    --   8.8*   < >   --    < >   --    CRP   --    --    --    --    --    --    --   7.5    < > = values in this interval not displayed.         Skin Assessment (location):   Panus folds and bilateral groin folds  History:  Decreased LOC, unable to discuss with pt.  Not responsive to commands  Panus folds intact other than two small, 0.3cm x 0.2cm lesions noted to the very far, lateral right side, slightly bloody.  Folds are dry  Bilateral groin folds with a thin line of split in the skin at the base of the left groin fold, no drainage,   medial thighs intact with red, blanchable erythema, a little too moist looking but intact           Intervention:     Patient's chart evaluated.      Assessed: areas as noted above    Orders  Written    Supplies  reviewed    Discussed plan of care with Nurse        Assessment:      Small scattered areas of very superificial, partial thickness skin loss  found in right panus crease and the left groin fold.  No evidence of obvious fungal rash, skin a little red between the thighs but intact.  Will use fungal powder then baby powder and Interdry to minimize friction in the short term then switch to just powder or fabric.  No dressings necessary for the small open spots noted in panus and groin folds          Plan:   Nursing to notify the Provider(s) and re-consult the WOC Nurse if skin deteriorate(s).  Skin care plan to panus and groin folds:  TID and prn  1. Clean with gentle soap and water, dry  2. Dust with antifungal powder and tuck InterDry Ag fabric into folds to minimize chaffing and dry out skin  After 5 days (June 5, 2018) stop the antifungal powder and just use baby powder OR InterDry Fabric  .  WOC Nurse will return: no need to continue to follow

## 2018-05-29 NOTE — PLAN OF CARE
Problem: Patient Care Overview  Goal: Plan of Care/Patient Progress Review  PT obtunded and lethargic over the night. Opens eyes spont, does not follow commands, withdraws from pain. Pupils equal but very sluggish. Pt hypertensive at end of shift, scheduled metoprolol and PRN Hydralazine given. NSR on tele. Saturating fine, on 3L NC. Skin folds reddened and exfoliated/ Interdry and Anti-fungal powder applied. MRI done over night. LP today.  updated via phone X2.

## 2018-05-30 ENCOUNTER — APPOINTMENT (OUTPATIENT)
Dept: GENERAL RADIOLOGY | Facility: CLINIC | Age: 73
DRG: 698 | End: 2018-05-30
Attending: PSYCHIATRY & NEUROLOGY
Payer: MEDICARE

## 2018-05-30 LAB
ANION GAP SERPL CALCULATED.3IONS-SCNC: 8 MMOL/L (ref 3–14)
APPEARANCE CSF: CLEAR
BUN SERPL-MCNC: 28 MG/DL (ref 7–30)
CALCIUM SERPL-MCNC: 8.2 MG/DL (ref 8.5–10.1)
CHLORIDE SERPL-SCNC: 108 MMOL/L (ref 94–109)
CO2 SERPL-SCNC: 28 MMOL/L (ref 20–32)
COLOR CSF: COLORLESS
CREAT SERPL-MCNC: 1.22 MG/DL (ref 0.52–1.04)
ERYTHROCYTE [DISTWIDTH] IN BLOOD BY AUTOMATED COUNT: 13.4 % (ref 10–15)
GFR SERPL CREATININE-BSD FRML MDRD: 43 ML/MIN/1.7M2
GLUCOSE BLDC GLUCOMTR-MCNC: 158 MG/DL (ref 70–99)
GLUCOSE BLDC GLUCOMTR-MCNC: 162 MG/DL (ref 70–99)
GLUCOSE BLDC GLUCOMTR-MCNC: 170 MG/DL (ref 70–99)
GLUCOSE BLDC GLUCOMTR-MCNC: 222 MG/DL (ref 70–99)
GLUCOSE BLDC GLUCOMTR-MCNC: 236 MG/DL (ref 70–99)
GLUCOSE BLDC GLUCOMTR-MCNC: 351 MG/DL (ref 70–99)
GLUCOSE BLDC GLUCOMTR-MCNC: 361 MG/DL (ref 70–99)
GLUCOSE CSF-MCNC: 100 MG/DL (ref 40–70)
GLUCOSE SERPL-MCNC: 184 MG/DL (ref 70–99)
GRAM STN SPEC: NORMAL
HCT VFR BLD AUTO: 33.6 % (ref 35–47)
HGB BLD-MCNC: 10.6 G/DL (ref 11.7–15.7)
LEVETIRACETAM SERPL-MCNC: 47 UG/ML (ref 12–46)
MCH RBC QN AUTO: 29.7 PG (ref 26.5–33)
MCHC RBC AUTO-ENTMCNC: 31.5 G/DL (ref 31.5–36.5)
MCV RBC AUTO: 94 FL (ref 78–100)
PLATELET # BLD AUTO: 175 10E9/L (ref 150–450)
POTASSIUM SERPL-SCNC: 3.5 MMOL/L (ref 3.4–5.3)
PROT CSF-MCNC: 48 MG/DL (ref 15–60)
RBC # BLD AUTO: 3.57 10E12/L (ref 3.8–5.2)
RBC # CSF MANUAL: 3 /UL (ref 0–2)
RBC # CSF MANUAL: NORMAL /UL (ref 0–2)
SODIUM SERPL-SCNC: 144 MMOL/L (ref 133–144)
SPECIMEN SOURCE: NORMAL
TUBE # CSF: 4 #
WBC # BLD AUTO: 6.2 10E9/L (ref 4–11)
WBC # CSF MANUAL: 0 /UL (ref 0–5)
WBC # CSF MANUAL: NORMAL /UL (ref 0–5)

## 2018-05-30 PROCEDURE — 85027 COMPLETE CBC AUTOMATED: CPT | Performed by: INTERNAL MEDICINE

## 2018-05-30 PROCEDURE — 89050 BODY FLUID CELL COUNT: CPT

## 2018-05-30 PROCEDURE — 25000128 H RX IP 250 OP 636: Performed by: INTERNAL MEDICINE

## 2018-05-30 PROCEDURE — 00000102 ZZHCL STATISTIC CYTO WRIGHT STAIN TC

## 2018-05-30 PROCEDURE — 25000125 ZZHC RX 250: Performed by: PHYSICIAN ASSISTANT

## 2018-05-30 PROCEDURE — 87205 SMEAR GRAM STAIN: CPT

## 2018-05-30 PROCEDURE — 99233 SBSQ HOSP IP/OBS HIGH 50: CPT | Performed by: HOSPITALIST

## 2018-05-30 PROCEDURE — 88108 CYTOPATH CONCENTRATE TECH: CPT

## 2018-05-30 PROCEDURE — 84157 ASSAY OF PROTEIN OTHER: CPT

## 2018-05-30 PROCEDURE — 25000125 ZZHC RX 250: Performed by: INTERNAL MEDICINE

## 2018-05-30 PROCEDURE — 25800025 ZZH RX 258: Performed by: INTERNAL MEDICINE

## 2018-05-30 PROCEDURE — 86789 WEST NILE VIRUS ANTIBODY: CPT

## 2018-05-30 PROCEDURE — A9270 NON-COVERED ITEM OR SERVICE: HCPCS | Mod: GY | Performed by: HOSPITALIST

## 2018-05-30 PROCEDURE — 86788 WEST NILE VIRUS AB IGM: CPT

## 2018-05-30 PROCEDURE — 82945 GLUCOSE OTHER FLUID: CPT

## 2018-05-30 PROCEDURE — 25000131 ZZH RX MED GY IP 250 OP 636 PS 637: Mod: GY | Performed by: INTERNAL MEDICINE

## 2018-05-30 PROCEDURE — 25000131 ZZH RX MED GY IP 250 OP 636 PS 637: Mod: GY | Performed by: HOSPITALIST

## 2018-05-30 PROCEDURE — 25000132 ZZH RX MED GY IP 250 OP 250 PS 637: Mod: GY | Performed by: HOSPITALIST

## 2018-05-30 PROCEDURE — 87070 CULTURE OTHR SPECIMN AEROBIC: CPT

## 2018-05-30 PROCEDURE — 25000128 H RX IP 250 OP 636: Performed by: PSYCHIATRY & NEUROLOGY

## 2018-05-30 PROCEDURE — 80048 BASIC METABOLIC PNL TOTAL CA: CPT | Performed by: INTERNAL MEDICINE

## 2018-05-30 PROCEDURE — 88108 CYTOPATH CONCENTRATE TECH: CPT | Mod: 26

## 2018-05-30 PROCEDURE — 25000128 H RX IP 250 OP 636: Performed by: HOSPITALIST

## 2018-05-30 PROCEDURE — 77003 FLUOROGUIDE FOR SPINE INJECT: CPT

## 2018-05-30 PROCEDURE — 99232 SBSQ HOSP IP/OBS MODERATE 35: CPT | Performed by: PSYCHIATRY & NEUROLOGY

## 2018-05-30 PROCEDURE — 40000061 ZZH STATISTIC EEG TIME EA 10 MIN

## 2018-05-30 PROCEDURE — 12000000 ZZH R&B MED SURG/OB

## 2018-05-30 PROCEDURE — 87798 DETECT AGENT NOS DNA AMP: CPT

## 2018-05-30 PROCEDURE — 25000125 ZZHC RX 250: Performed by: HOSPITALIST

## 2018-05-30 PROCEDURE — 00000146 ZZHCL STATISTIC GLUCOSE BY METER IP

## 2018-05-30 PROCEDURE — 87529 HSV DNA AMP PROBE: CPT

## 2018-05-30 PROCEDURE — 40000239 ZZH STATISTIC VAT ROUNDS

## 2018-05-30 RX ORDER — FAMOTIDINE 20 MG/1
20 TABLET, FILM COATED ORAL DAILY
Status: DISCONTINUED | OUTPATIENT
Start: 2018-05-31 | End: 2018-06-01 | Stop reason: HOSPADM

## 2018-05-30 RX ORDER — NICOTINE POLACRILEX 4 MG
15-30 LOZENGE BUCCAL
Status: DISCONTINUED | OUTPATIENT
Start: 2018-05-30 | End: 2018-05-30

## 2018-05-30 RX ORDER — LIDOCAINE HYDROCHLORIDE 10 MG/ML
30 INJECTION, SOLUTION EPIDURAL; INFILTRATION; INTRACAUDAL; PERINEURAL ONCE
Status: COMPLETED | OUTPATIENT
Start: 2018-05-30 | End: 2018-05-30

## 2018-05-30 RX ORDER — PREDNISONE 5 MG/1
5 TABLET ORAL DAILY
Status: DISCONTINUED | OUTPATIENT
Start: 2018-05-31 | End: 2018-06-01 | Stop reason: HOSPADM

## 2018-05-30 RX ORDER — LEVOTHYROXINE SODIUM 50 UG/1
50 TABLET ORAL
Status: DISCONTINUED | OUTPATIENT
Start: 2018-05-31 | End: 2018-06-01 | Stop reason: HOSPADM

## 2018-05-30 RX ORDER — DEXTROSE MONOHYDRATE 25 G/50ML
25-50 INJECTION, SOLUTION INTRAVENOUS
Status: DISCONTINUED | OUTPATIENT
Start: 2018-05-30 | End: 2018-05-30

## 2018-05-30 RX ORDER — LABETALOL 100 MG/1
100 TABLET, FILM COATED ORAL EVERY 12 HOURS SCHEDULED
Status: DISCONTINUED | OUTPATIENT
Start: 2018-05-30 | End: 2018-05-31

## 2018-05-30 RX ORDER — WATER 10 ML/10ML
INJECTION INTRAMUSCULAR; INTRAVENOUS; SUBCUTANEOUS
Status: DISCONTINUED
Start: 2018-05-30 | End: 2018-05-30 | Stop reason: HOSPADM

## 2018-05-30 RX ORDER — FAMOTIDINE 10 MG
10 TABLET ORAL DAILY
Status: DISCONTINUED | OUTPATIENT
Start: 2018-05-31 | End: 2018-05-30

## 2018-05-30 RX ADMIN — LEVETIRACETAM 750 MG: 100 INJECTION, SOLUTION INTRAVENOUS at 01:43

## 2018-05-30 RX ADMIN — FAMOTIDINE 20 MG: 10 INJECTION, SOLUTION INTRAVENOUS at 09:48

## 2018-05-30 RX ADMIN — LEVETIRACETAM 750 MG: 100 INJECTION, SOLUTION INTRAVENOUS at 14:36

## 2018-05-30 RX ADMIN — LIDOCAINE HYDROCHLORIDE 5 ML: 10 INJECTION, SOLUTION EPIDURAL; INFILTRATION; INTRACAUDAL; PERINEURAL at 08:38

## 2018-05-30 RX ADMIN — LABETALOL HYDROCHLORIDE 100 MG: 100 TABLET, FILM COATED ORAL at 21:28

## 2018-05-30 RX ADMIN — HYDRALAZINE HYDROCHLORIDE 10 MG: 20 INJECTION INTRAMUSCULAR; INTRAVENOUS at 01:53

## 2018-05-30 RX ADMIN — METOPROLOL TARTRATE 5 MG: 5 INJECTION, SOLUTION INTRAVENOUS at 04:05

## 2018-05-30 RX ADMIN — LEVOTHYROXINE SODIUM ANHYDROUS 25 MCG: 100 INJECTION, POWDER, LYOPHILIZED, FOR SOLUTION INTRAVENOUS at 09:48

## 2018-05-30 RX ADMIN — INSULIN ASPART 1 UNITS: 100 INJECTION, SOLUTION INTRAVENOUS; SUBCUTANEOUS at 05:50

## 2018-05-30 RX ADMIN — TACROLIMUS 2.5 MG: 0.5 CAPSULE ORAL at 21:27

## 2018-05-30 RX ADMIN — INSULIN ASPART 1 UNITS: 100 INJECTION, SOLUTION INTRAVENOUS; SUBCUTANEOUS at 10:02

## 2018-05-30 RX ADMIN — METHYLPREDNISOLONE SODIUM SUCCINATE 10 MG: 40 INJECTION, POWDER, FOR SOLUTION INTRAMUSCULAR; INTRAVENOUS at 14:35

## 2018-05-30 RX ADMIN — SODIUM BICARBONATE: 84 INJECTION, SOLUTION INTRAVENOUS at 10:50

## 2018-05-30 RX ADMIN — INSULIN GLARGINE 10 UNITS: 100 INJECTION, SOLUTION SUBCUTANEOUS at 22:57

## 2018-05-30 RX ADMIN — METOPROLOL TARTRATE 5 MG: 5 INJECTION, SOLUTION INTRAVENOUS at 09:48

## 2018-05-30 RX ADMIN — HEPARIN SODIUM 5000 UNITS: 5000 INJECTION, SOLUTION INTRAVENOUS; SUBCUTANEOUS at 19:06

## 2018-05-30 RX ADMIN — ALTEPLASE 2 MG: 2.2 INJECTION, POWDER, LYOPHILIZED, FOR SOLUTION INTRAVENOUS at 16:26

## 2018-05-30 RX ADMIN — CEFTRIAXONE SODIUM 2 G: 2 INJECTION, POWDER, FOR SOLUTION INTRAMUSCULAR; INTRAVENOUS at 06:16

## 2018-05-30 RX ADMIN — INSULIN ASPART 2 UNITS: 100 INJECTION, SOLUTION INTRAVENOUS; SUBCUTANEOUS at 01:44

## 2018-05-30 NOTE — PROGRESS NOTES
[facility]    Hospitalist Progress Note  When I evaluated patient: 05/30/2018    Assessment & Plan    72 year old female with multiple chronic medical problems admitted with aggressive change in mental state for the last 72 hours or so.  She has had a history of recurrent admissions for encephalopathy.     Sepsis due to E. coli bacteremia and catheter associated UTI: Patient with worsening encephalopathy the last 3 days prior to presentation.  UA abnormal on admission, his chronic indwelling Johnson catheter.  -Blood culture and urine culture is positive for E. Coli, repeat blood culture negative  -Organism is pansensitive, continue  Rocephin IV (initially on cefepime)     Acute metabolic encephalopathy, suspect related to sepsis.  Suspected Seizures  -Patient has had problems with recurrent encephalopathies in the past. Likely multifactorial; initially thought to be due to the septic process, however patient was not improving as expected despite appropriate antibiotics for several days. Initial head CT-negative for acute findings. MRI brain done on 5/28 without acute pathology, however there was significant motion artifact  -Ammonia, TSH within normal limits  -Patient was started on Keppra during the last hospital stay and also dose of her Neurontin was reduced, Trileptal and tramadol was discontinued during last discharge.  -Keppra level high at presentation at 95, restarted on 500 mg twice daily(50% of home dose) on 5/26, subsequently increased to 750 mg twice daily on 5/28. Also loaded with IV fosphenytoin on 5/28, and continued on IV.  -Continuous EEG monitoring without epileptiform discharges. Change antiseizure meds to p.o. if okay with neurology.  - s/p lumbar puncture this, Gram stain without organisms, glucose and protein within range, rest workup pending.  -Discussed with Dr. Choudhury, neurology this morning.   - Patient is with near normal mentation now.    Acute on chronic hypoxic and  hypercapnic respiratory failure.    -VBG demonstrated both hypercapnia and hypoxia at presentation.    -Gas exchange much improved, oxygen weaned off to room air, however needing O2 supplement at night given her history of obstructive sleep apnea.  -Will have her family bring CPAP, continue CPAP at home setting.    Acute on chronic kidney disease-III  History of kidney transplant.    -The patient is on tacrolimus and prednisone.    -tacrolimus level were supra- therapeutic at presentation; levels improving  -Initially placed on IV tacrolimus however now on hold, being managed by nephrology  -Creatinine continues to improve, 1.22 today, likely at baseline  -IV fluids per nephrology.     Diabetes mellitus type 2.  -Hemoglobin A1c 8.8.  PTA is on 22 units of insulin glargine at bedtime.  -So far was managed with sliding-scale insulin  -More alert now, moderate carb diet resumed, does have decreased p.o. intake though.   -Blood sugars in high 100s.  Resume Lantus at lower dose and titrate.        Hypothyroidism.    -Continue PTA Synthroid is p.o. now.  -TSH within normal limits     Benign essential hypertension.   -PTA on clonidine and labetalol.    -Was on IV metoprolol, changed to labetalol.  -Blood pressure adequately controlled at the moment.  -IV hydralazine and labetalol as needed to be given for systolics greater than 180.     Hyperlipidemia.    -Resume pravastatin     # Pain Assessment:  Current Pain Score 5/29/2018   Patient currently in pain? denies   Pain score (0-10) -   Pain location -   Pain descriptors -   rFLACC pain score -   CPOT pain score -   - Gem denies pain and is not receiving pain medication.    D/W: RN  DVT Prophylaxis: Pneumatic Compression Devices  Code Status: Full Code    Disposition: Expected discharge 2 days or so.  Transfer out of ICU today, will get therapy eval's for discharge planning.  Discussed with patient, reviewed with nursing staff.    Sharri Lujan,  MD  Hospitalist    Interval History    -Patient is alert awake today, denies headache nausea vomiting.  No neck pain or stiffness.  -Remains afebrile.  -Denies chest pain or dyspnea.  -Feels tired.  -Had lumbar puncture this morning.    -Data reviewed today: I reviewed all new labs and imaging results over the last 24 hours. I personally reviewed the EKG tracing showing Sinus and rate controlled on telemetry.  EEG report discussed with neurology, no seizure-like activity.    Physical Exam   Temp: 98.2  F (36.8  C) Temp src: Oral BP: 135/84   Heart Rate: 69 Resp: 8 SpO2: 99 % O2 Device: Nasal cannula Oxygen Delivery: 2 LPM  Vitals:    05/27/18 0600 05/28/18 1300 05/28/18 1615   Weight: 97.5 kg (214 lb 14.4 oz) 101.9 kg (224 lb 9.6 oz) 101 kg (222 lb 10.6 oz)     Vital Signs with Ranges  Temp:  [97.6  F (36.4  C)-98.4  F (36.9  C)] 98.2  F (36.8  C)  Heart Rate:  [63-81] 69  Resp:  [8-21] 8  BP: ()/() 135/84  SpO2:  [85 %-100 %] 99 %  I/O last 3 completed shifts:  In: 2516.58 [P.O.:600; I.V.:1916.58]  Out: 1600 [Urine:1600]    Constitutional: Alert awake, oriented to self place and current date.  HEENT: Moist oral mucosa  Neck- Supple, Good ROM   Respiratory: Bilateral equal air entry, deemed but clear to auscultation, normal effort of breathing.  Remains on room air.  Cardiovascular: RRR, No murmur.  No tachycardia.  GI: Soft, Non- tender, BS- normoactive  Skin/Integument: Warm and dry, no rashes.  Ecchymosis noted distal to right wrist.  MSK: No joint deformity or swelling, trace-1+ edema  Neuro: Pupil equal; about 2 mm bilaterally, cranial nerves II through XII intact.  Motor strength equal and symmetrical in upper extremities.    Medications       IV infusion builder WITH additives 100 mL/hr at 05/30/18 1050     - MEDICATION INSTRUCTIONS -       - MEDICATION INSTRUCTIONS -       tacrolimus (Prograf) infusion ADULT 30 mcg/hr (05/30/18 0000)         cefTRIAXone  2 g Intravenous Q24H     famotidine  20 mg  Intravenous Q12H     heparin  5,000 Units Subcutaneous Q12H     insulin aspart  1-12 Units Subcutaneous Q4H     levETIRAcetam  750 mg Intravenous Q12H     levothyroxine  25 mcg Intravenous Daily     methylPREDNISolone  10 mg Intravenous Q24H     metoprolol  5 mg Intravenous Q6H     sodium chloride (PF)  10 mL Intracatheter Q8H     sodium chloride (PF)  3 mL Intracatheter Q8H       Data       Recent Labs  Lab 05/30/18  0426 05/29/18  0440 05/28/18  0705 05/28/18  0520  05/25/18  0126   WBC 6.2 5.0 6.1 4.9  < > 9.1   HGB 10.6* 12.3 10.6* 10.9*  < > 12.9   MCV 94 92 93 93  < > 92    151 156 Canceled, Test credited  < > 163    143  --  142  < > 133   POTASSIUM 3.5 4.0  --  4.3  < > 4.8   CHLORIDE 108 111*  --  116*  < > 100   CO2 28 23  --  16*  < > 24   BUN 28 35*  --  49*  < > 49*   CR 1.22* 1.39*  --  1.63*  < > 1.71*   ANIONGAP 8 9  --  10  < > 9   ROGER 8.2* 8.9  --  8.9  < > 9.7   * 160*  --  54*  < > 251*   ALBUMIN  --   --   --  2.4*  --  3.0*   PROTTOTAL  --   --   --  6.9  --  8.6   BILITOTAL  --   --   --  0.3  --  0.5   ALKPHOS  --   --   --  59  --  88   ALT  --   --   --  14  --  17   AST  --   --   --  17  --  8   < > = values in this interval not displayed.    No results found for this or any previous visit (from the past 24 hour(s)).

## 2018-05-30 NOTE — PROGRESS NOTES
Rapid return to awake and lucid state since last PM.  Now stable and on an oral diet.  Good UO continues.  Creatinine further improved; now 1.22.  Lytes nl.  Still awaiting Tacrolimus level from this AM.  Note plan to transfer out of ICU.    I will stop IV Tacrolimus and Methylprednisolone.  Usual oral doses of Prednisone and Tacrolimus resumed.  Our group will follow in the background during the remainder of her in-pt stay.  Call with questions.  Thanks.    Robin Mejia MD  Nephrology; [facility]  [phone]

## 2018-05-30 NOTE — PROGRESS NOTES
ICU Multi-Disciplinary Note  Patient condition reviewed and discussed while on multidisciplinary rounds today.   72 yr old here with Sepsis with bacteremia 2nd to pan sensitive UTI. Hx of renal transplant and DM.  VSS. Encepholopathy improved today.  Plan to tx to floor  The Critical Care service will continue to follow peripherally while patient is within the ICU. We are readily available should issues arise.  Please feel free to contact us for critical care issues with which we may be of assistance. For all other concerns, please contact primary service first.     Vianca Raines

## 2018-05-30 NOTE — PROGRESS NOTES
SPIRITUAL HEALTH SERVICES Progress Note  [facility]    Follow up visit with pt. Pt was awake today and stated that it was just yesterday that she woke up. Pt remembered this  from her previous hospitalization and expressed gratitude for the visit. Pt spoke about her love for her family and the raheel of having a grandson. Pt requested prayer for herself and her family.  provided listening and prayer. SH will continue to follow as length of stay persists or by request.       Cornelia Pyle  Chaplain Resident  Pager: [phone]  Office: [phone]

## 2018-05-30 NOTE — PROGRESS NOTES
[facility]  Neuroscience Intensive Care Progress Note  2018    Gem Jade is a 72 year old year old female admitted on 2018 with sepsis due to E. coli bacteremia along with alteration in mental status 3 days prior to presentation.     Problem List  1.  Metabolic encephalopathy  2.  Sepsis due to E. coli bacteremia, resolving  3.  Acute on chronic hypoxic and hypercapnic respiratory failure, improving  4.  Kidney transplant, on tacrolimus  5.  Diabetes mellitus, A1c 8.8  6.  Hypothyroidism    24 hour events:  Marked improvement in her mental status overnight    24 Hour Vital Signs Summary:  Temperatures:  Current - Temp: 98.2  F (36.8  C); Max - Temp  Av  F (36.7  C)  Min: 97.6  F (36.4  C)  Max: 98.4  F (36.9  C)  Respiration range: Resp  Av.6  Min: 8  Max: 21  Pulse range: No Data Recorded  Blood pressure range: Systolic (24hrs), Av , Min:80 , Max:187   ; Diastolic (24hrs), Av, Min:41, Max:112    Pulse oximetry range: SpO2  Av.1 %  Min: 85 %  Max: 100 %    Ventilator Settings  Resp: 8      Intake/Output Summary (Last 24 hours) at 18 0956  Last data filed at 18 0600   Gross per 24 hour   Intake          2316.58 ml   Output             1600 ml   Net           716.58 ml       Current Medications:    cefTRIAXone  2 g Intravenous Q24H     famotidine  20 mg Intravenous Q12H     heparin  5,000 Units Subcutaneous Q12H     insulin aspart  1-12 Units Subcutaneous Q4H     levETIRAcetam  750 mg Intravenous Q12H     levothyroxine  25 mcg Intravenous Daily     methylPREDNISolone  10 mg Intravenous Q24H     metoprolol  5 mg Intravenous Q6H     sodium chloride (PF)  10 mL Intracatheter Q8H     sodium chloride (PF)  3 mL Intracatheter Q8H       PRN Medications:  acetaminophen (TYLENOL) tablet 325-650 mg, bisacodyl, glucose **OR** dextrose **OR** glucagon, HOLD MEDICATION, hydrALAZINE, hypromellose-dextran, labetalol, lidocaine 4%, lidocaine (buffered or not  buffered), lidocaine (buffered or not buffered), magnesium sulfate, - MEDICATION INSTRUCTIONS -, miconazole, naloxone, - MEDICATION INSTRUCTIONS -, ondansetron **OR** ondansetron, polyethylene glycol, potassium chloride, potassium chloride with lidocaine, potassium chloride, potassium chloride, potassium chloride, prochlorperazine **OR** prochlorperazine **OR** prochlorperazine, senna-docusate **OR** senna-docusate, sodium chloride (PF), sodium chloride (PF)    Infusions:    IV infusion builder WITH additives 100 mL/hr at 05/29/18 2246     - MEDICATION INSTRUCTIONS -       - MEDICATION INSTRUCTIONS -       tacrolimus (Prograf) infusion ADULT 30 mcg/hr (05/30/18 0000)       Allergies   Allergen Reactions     Bactrim Ds [Sulfamethoxazole W/Trimethoprim]      Creatinine level increased     Atorvastatin Calcium      myalgias, muscle aches     Codeine Nausea and Vomiting     Darvocet [Propoxyphene N-Apap] Nausea and Vomiting     Hydromorphone      Levofloxacin Other (See Comments) and Diarrhea     hallucinations     Morphine      Nausea and vomiting     Niaspan [Niacin] GI Disturbance     Flushing even at low dose, GI upset     Percocet [Oxycodone-Acetaminophen]      Vomiting after taking med couple of times     Vicodin [Hydrocodone-Acetaminophen] Nausea and Vomiting       Physical Examination:  /84  Pulse 71  Temp 98.2  F (36.8  C) (Oral)  Resp 8  Wt 101 kg (222 lb 10.6 oz)  SpO2 99%  BMI 35.94 kg/m2  GENERAL APPEARANCE ADULT: Alert, in no acute distress    Neurological Examination  MENTAL STATE:  Alert, oriented to self, disoriented to time.  Cranial nerves: Pupils equal round reactive to light extraocular muscles intact, face appears symmetric, hearing intact to conversation and finger rub, palate elevates symmetrically, shoulder shrug strong bilaterally, tongue protrudes midline with no deviation.  Motor: Grossly full strength in bilateral upper extremities, bilateral lower extremities with profound  proximal, able to wiggle toes bilaterally  Sensation: Intact to light touch throughout  Coordination finger to nose intact bilaterally, heel-to-shin unable to be assessed due to patient lower extremity weakness  Reflexes hyporeflexic throughout, 1+  Gait: Deferred due to the fact patient is bedbound at baseline  HENT: Mouth and posterior oropharynx normal, moist mucous membranes  RESP: lungs clear to auscultation   CV: regular rate and rhythm, no murmur, rub, or gallop  ABDOMEN: normal bowel sounds, soft, nontender, no hepatosplenomegaly or other masses    Labs/Studies:  CMP  Recent Labs  Lab 05/30/18 0426 05/29/18 0440 05/28/18  0520 05/27/18  0525  05/25/18  0126    143 142 140  < > 133   POTASSIUM 3.5 4.0 4.3 4.7  < > 4.8   CHLORIDE 108 111* 116* 114*  < > 100   CO2 28 23 16* 19*  < > 24   ANIONGAP 8 9 10 7  < > 9   * 160* 54* 163*  < > 251*   BUN 28 35* 49* 54*  < > 49*   CR 1.22* 1.39* 1.63* 1.48*  < > 1.71*   GFRESTIMATED 43* 37* 31* 35*  < > 29*   GFRESTBLACK 52* 45* 38* 42*  < > 36*   ROGER 8.2* 8.9 8.9 8.9  < > 9.7   MAG  --  1.8  --   --   --   --    PHOS  --  3.0 3.0  --   --   --    PROTTOTAL  --   --  6.9  --   --  8.6   ALBUMIN  --   --  2.4*  --   --  3.0*   BILITOTAL  --   --  0.3  --   --  0.5   ALKPHOS  --   --  59  --   --  88   AST  --   --  17  --   --  8   ALT  --   --  14  --   --  17   < > = values in this interval not displayed.  CBC  Recent Labs  Lab 05/30/18 0426 05/29/18 0440 05/28/18  0705 05/28/18  0520   WBC 6.2 5.0 6.1 4.9   RBC 3.57* 4.15 3.58* 3.67*   HGB 10.6* 12.3 10.6* 10.9*   HCT 33.6* 38.0 33.2* 34.2*   MCV 94 92 93 93   MCH 29.7 29.6 29.6 29.7   MCHC 31.5 32.4 31.9 31.9   RDW 13.4 13.3 13.4 13.3    151 156 Canceled, Test credited     Imaging:   MRI without overt abnormalities  EEG:  []No epileptic activity appreciated      Assessment/Plan  Neuro:  Patient with altered mental status, concern for seizure.  EEG without epileptic activity.  Fosphenytoin  stopped yesterday.  Mental status is significantly improved today to the point where she is alert awake and oriented.  Consideration here is that this was a metabolic encephalopathy that is just finally cleared.  Continue on the 750 mg twice daily of Keppra.  Withhold his many sedating medications as possible.  No new antiepileptics to add at this point in time.  Keep with her regular follow-up, no emergent need for neurology outpatient follow-up.    No further neurologic interventions or investigations from our perspective at this time. Neurology will sign off. Please call with any further question.     Code: Full Code    Miguelito Choudhury MD  Vascular Neurology/Neurocritical Care  Text Page - [phone]

## 2018-05-30 NOTE — PROGRESS NOTES
Nephrology Progress Note          Assessment and Plan:   Acute on chronic renal failure improved.  Creatinine now near baseline 1.2-1.4.  Likely reversing pre-renal etiology.  No change in meds except reduce NaHCO3 concentration in IV fluid.  Continue generous rate.  Note Tacrolimus blood level again in AM on current lower infusion rate.       UTI (urinary tract infection)    * No resolved hospital problems. *               Interval History:   Minimally responsive during my exam.  VS stable.  No fever.  Good SaO2 without supplement.  Large UO last 24 hrs, >4L.  Meds and labs reviewed.  Lytes nl; CO2 up to 23.  Cr down to ~1.4; BUN also much lower.  CBC nl.                Medications:       cefTRIAXone  2 g Intravenous Q24H     famotidine  20 mg Intravenous Q24H     heparin  5,000 Units Subcutaneous Q12H     insulin aspart  1-12 Units Subcutaneous Q4H     levETIRAcetam  750 mg Intravenous Q12H     levothyroxine  25 mcg Intravenous Daily     methylPREDNISolone  10 mg Intravenous Q24H     metoprolol  5 mg Intravenous Q6H     sodium chloride (PF)  10 mL Intracatheter Q8H     sodium chloride (PF)  3 mL Intracatheter Q8H       IV infusion builder WITH additives 100 mL/hr at 05/29/18 2016     - MEDICATION INSTRUCTIONS -       - MEDICATION INSTRUCTIONS -       tacrolimus (Prograf) infusion ADULT 30 mcg/hr (05/29/18 0906)                    Physical Exam:       Vital Sign Ranges  Temp:  [97.6  F (36.4  C)-98.8  F (37.1  C)] 97.6  F (36.4  C)  Heart Rate:  [63-80] 66  Resp:  [7-20] 10  BP: ()/() 128/112  SpO2:  [83 %-100 %] 100 %    Weight, current:  101 kg (actual weight)  Weight change:     I/O last 3 completed shifts:  In: 2607.35 [I.V.:2607.35]  Out: 4015 [Urine:4015]    Physical Exam:   General:  Patient comfortable, in no apparent distress.  Does not awaken to verbal or tactile cues.  Neck:  Supple, no JVD.  Lungs:  Clear to auscultation bilaterally.  Cardiac:  Regular rate and rhythm, no murmurs, rub, or  gallops.  Abdomen:  Soft, nontender, physiologic sounds.  Extremities:  Trace edema.  2+ pulses.  Skin:  Warm, dry.  Neurologic:  No focal deficits.             Data:        Lab Results   Component Value Date     05/29/2018    Lab Results   Component Value Date    CHLORIDE 111 (H) 05/29/2018    Lab Results   Component Value Date    BUN 35 (H) 05/29/2018      Lab Results   Component Value Date    POTASSIUM 4.0 05/29/2018    Lab Results   Component Value Date    CO2 23 05/29/2018    Lab Results   Component Value Date    CR 1.39 (H) 05/29/2018        Lab Results   Component Value Date     05/29/2018     05/28/2018     05/27/2018     Lab Results   Component Value Date    POTASSIUM 4.0 05/29/2018    POTASSIUM 4.3 05/28/2018    POTASSIUM 4.7 05/27/2018     Lab Results   Component Value Date    CHLORIDE 111 (H) 05/29/2018    CHLORIDE 116 (H) 05/28/2018    CHLORIDE 114 (H) 05/27/2018     Lab Results   Component Value Date    CO2 23 05/29/2018    CO2 16 (L) 05/28/2018    CO2 19 (L) 05/27/2018     Lab Results   Component Value Date    CR 1.39 (H) 05/29/2018    CR 1.63 (H) 05/28/2018    CR 1.48 (H) 05/27/2018     Lab Results   Component Value Date    BUN 35 (H) 05/29/2018    BUN 49 (H) 05/28/2018    BUN 54 (H) 05/27/2018     Lab Results   Component Value Date    HGB 12.3 05/29/2018    HGB 10.6 (L) 05/28/2018    HGB 10.9 (L) 05/28/2018     Lab Results   Component Value Date    PH 7.35 05/28/2018    PO2 145 (H) 05/28/2018    PCO2 37 05/28/2018    HCO3 20 (L) 05/28/2018    FLAKITO 3.0 01/19/2018             Robin Mejia MD  Nephrology; [facility]  [phone]

## 2018-05-30 NOTE — PROVIDER NOTIFICATION
MD Notification    Notified Person: MD    Notified Person Name: Le    Notification Date/Time: 5/30/18 8143    Notification Interaction: Paged    Purpose of Notification: Pt PICC clotted off. We need alteplase order unless you think it should be dc'd. Very hard stick. One working PIV in place.    Orders Received:    Comments:

## 2018-05-30 NOTE — PROGRESS NOTES
RADIOLOGY PROCEDURE NOTE  Patient name: Gem Jade  MRN: [id number]  : 1945    Pre-procedure diagnosis: Altered mental status  Post-procedure diagnosis: Same    Procedure Date/Time: May 30, 2018  9:15 AM  Procedure: Lumbar puncture  Estimated blood loss: None  Specimen(s) collected with description: 9 cc clear CSF  The patient tolerated the procedure well with no immediate complications.  Significant findings:none    See imaging dictation for procedural details.    Provider name: Paxton Lewis  Assistant(s):None

## 2018-05-31 LAB
ANION GAP SERPL CALCULATED.3IONS-SCNC: 7 MMOL/L (ref 3–14)
BACTERIA SPEC CULT: NO GROWTH
BASOPHILS # BLD AUTO: 0 10E9/L (ref 0–0.2)
BASOPHILS NFR BLD AUTO: 0.6 %
BUN SERPL-MCNC: 27 MG/DL (ref 7–30)
CALCIUM SERPL-MCNC: 8.4 MG/DL (ref 8.5–10.1)
CHLORIDE SERPL-SCNC: 105 MMOL/L (ref 94–109)
CO2 SERPL-SCNC: 27 MMOL/L (ref 20–32)
COPATH REPORT: NORMAL
CREAT SERPL-MCNC: 1.23 MG/DL (ref 0.52–1.04)
DIFFERENTIAL METHOD BLD: ABNORMAL
EOSINOPHIL # BLD AUTO: 0.2 10E9/L (ref 0–0.7)
EOSINOPHIL NFR BLD AUTO: 2.5 %
ERYTHROCYTE [DISTWIDTH] IN BLOOD BY AUTOMATED COUNT: 13.7 % (ref 10–15)
GFR SERPL CREATININE-BSD FRML MDRD: 43 ML/MIN/1.7M2
GLUCOSE BLDC GLUCOMTR-MCNC: 208 MG/DL (ref 70–99)
GLUCOSE BLDC GLUCOMTR-MCNC: 217 MG/DL (ref 70–99)
GLUCOSE BLDC GLUCOMTR-MCNC: 255 MG/DL (ref 70–99)
GLUCOSE BLDC GLUCOMTR-MCNC: 275 MG/DL (ref 70–99)
GLUCOSE BLDC GLUCOMTR-MCNC: 282 MG/DL (ref 70–99)
GLUCOSE SERPL-MCNC: 197 MG/DL (ref 70–99)
HCT VFR BLD AUTO: 32.2 % (ref 35–47)
HGB BLD-MCNC: 10.1 G/DL (ref 11.7–15.7)
HSV1 DNA CSF QL NAA+PROBE: NOT DETECTED
HSV2 DNA CSF QL NAA+PROBE: NOT DETECTED
IMM GRANULOCYTES # BLD: 0.1 10E9/L (ref 0–0.4)
IMM GRANULOCYTES NFR BLD: 1.2 %
INTERPRETATION ECG - MUSE: NORMAL
LYMPHOCYTES # BLD AUTO: 1.9 10E9/L (ref 0.8–5.3)
LYMPHOCYTES NFR BLD AUTO: 27.8 %
Lab: NORMAL
MAGNESIUM SERPL-MCNC: 1.6 MG/DL (ref 1.6–2.3)
MCH RBC QN AUTO: 29.6 PG (ref 26.5–33)
MCHC RBC AUTO-ENTMCNC: 31.4 G/DL (ref 31.5–36.5)
MCV RBC AUTO: 94 FL (ref 78–100)
MICROBIOLOGIST REVIEW: NORMAL
MONOCYTES # BLD AUTO: 0.6 10E9/L (ref 0–1.3)
MONOCYTES NFR BLD AUTO: 9.1 %
NEUTROPHILS # BLD AUTO: 4 10E9/L (ref 1.6–8.3)
NEUTROPHILS NFR BLD AUTO: 58.8 %
NRBC # BLD AUTO: 0 10*3/UL
NRBC BLD AUTO-RTO: 0 /100
PLATELET # BLD AUTO: 141 10E9/L (ref 150–450)
POTASSIUM SERPL-SCNC: 3.2 MMOL/L (ref 3.4–5.3)
POTASSIUM SERPL-SCNC: 3.5 MMOL/L (ref 3.4–5.3)
RBC # BLD AUTO: 3.41 10E12/L (ref 3.8–5.2)
SODIUM SERPL-SCNC: 139 MMOL/L (ref 133–144)
SPECIMEN SOURCE: NORMAL
SPECIMEN TYPE: NORMAL
VARICELLA ZOSTER DNA PCR COMMENT: NORMAL
VZV DNA SPEC QL NAA+PROBE: NORMAL
WBC # BLD AUTO: 6.8 10E9/L (ref 4–11)
WNV IGG CSF-ACNC: 0.06 IV
WNV IGM CSF-ACNC: 0 IV

## 2018-05-31 PROCEDURE — 40000893 ZZH STATISTIC PT IP EVAL DEFER

## 2018-05-31 PROCEDURE — 84132 ASSAY OF SERUM POTASSIUM: CPT | Performed by: HOSPITALIST

## 2018-05-31 PROCEDURE — 40000239 ZZH STATISTIC VAT ROUNDS

## 2018-05-31 PROCEDURE — 00000146 ZZHCL STATISTIC GLUCOSE BY METER IP

## 2018-05-31 PROCEDURE — 80048 BASIC METABOLIC PNL TOTAL CA: CPT | Performed by: HOSPITALIST

## 2018-05-31 PROCEDURE — 12000000 ZZH R&B MED SURG/OB

## 2018-05-31 PROCEDURE — 36415 COLL VENOUS BLD VENIPUNCTURE: CPT | Performed by: HOSPITALIST

## 2018-05-31 PROCEDURE — 25000128 H RX IP 250 OP 636: Performed by: PSYCHIATRY & NEUROLOGY

## 2018-05-31 PROCEDURE — 25000128 H RX IP 250 OP 636: Performed by: INTERNAL MEDICINE

## 2018-05-31 PROCEDURE — 25000131 ZZH RX MED GY IP 250 OP 636 PS 637: Mod: GY | Performed by: INTERNAL MEDICINE

## 2018-05-31 PROCEDURE — 25000131 ZZH RX MED GY IP 250 OP 636 PS 637: Mod: GY | Performed by: HOSPITALIST

## 2018-05-31 PROCEDURE — 99232 SBSQ HOSP IP/OBS MODERATE 35: CPT | Performed by: HOSPITALIST

## 2018-05-31 PROCEDURE — 85025 COMPLETE CBC W/AUTO DIFF WBC: CPT | Performed by: HOSPITALIST

## 2018-05-31 PROCEDURE — A9270 NON-COVERED ITEM OR SERVICE: HCPCS | Mod: GY | Performed by: HOSPITALIST

## 2018-05-31 PROCEDURE — 83735 ASSAY OF MAGNESIUM: CPT | Performed by: HOSPITALIST

## 2018-05-31 PROCEDURE — 25000132 ZZH RX MED GY IP 250 OP 250 PS 637: Mod: GY | Performed by: HOSPITALIST

## 2018-05-31 PROCEDURE — 25000125 ZZHC RX 250: Performed by: INTERNAL MEDICINE

## 2018-05-31 RX ORDER — CLONIDINE HYDROCHLORIDE 0.1 MG/1
0.1 TABLET ORAL 2 TIMES DAILY
Status: DISCONTINUED | OUTPATIENT
Start: 2018-05-31 | End: 2018-06-01 | Stop reason: HOSPADM

## 2018-05-31 RX ORDER — POTASSIUM CHLORIDE 1500 MG/1
20 TABLET, EXTENDED RELEASE ORAL
Status: COMPLETED | OUTPATIENT
Start: 2018-05-31 | End: 2018-05-31

## 2018-05-31 RX ORDER — LEVETIRACETAM 750 MG/1
750 TABLET ORAL 2 TIMES DAILY
Status: DISCONTINUED | OUTPATIENT
Start: 2018-05-31 | End: 2018-06-01 | Stop reason: HOSPADM

## 2018-05-31 RX ADMIN — POTASSIUM CHLORIDE 20 MEQ: 1500 TABLET, EXTENDED RELEASE ORAL at 10:29

## 2018-05-31 RX ADMIN — LABETALOL HYDROCHLORIDE 100 MG: 100 TABLET, FILM COATED ORAL at 08:01

## 2018-05-31 RX ADMIN — LEVETIRACETAM 750 MG: 750 TABLET, FILM COATED ORAL at 10:32

## 2018-05-31 RX ADMIN — INSULIN GLARGINE 20 UNITS: 100 INJECTION, SOLUTION SUBCUTANEOUS at 21:50

## 2018-05-31 RX ADMIN — TACROLIMUS 2.5 MG: 0.5 CAPSULE ORAL at 08:01

## 2018-05-31 RX ADMIN — Medication 150 MG: at 19:56

## 2018-05-31 RX ADMIN — FAMOTIDINE 20 MG: 20 TABLET, FILM COATED ORAL at 08:01

## 2018-05-31 RX ADMIN — PREDNISONE 5 MG: 5 TABLET ORAL at 08:01

## 2018-05-31 RX ADMIN — LEVETIRACETAM 750 MG: 100 INJECTION, SOLUTION INTRAVENOUS at 04:26

## 2018-05-31 RX ADMIN — LEVOTHYROXINE SODIUM 50 MCG: 50 TABLET ORAL at 07:00

## 2018-05-31 RX ADMIN — HEPARIN SODIUM 5000 UNITS: 5000 INJECTION, SOLUTION INTRAVENOUS; SUBCUTANEOUS at 06:59

## 2018-05-31 RX ADMIN — LEVETIRACETAM 750 MG: 750 TABLET, FILM COATED ORAL at 21:50

## 2018-05-31 RX ADMIN — CEFTRIAXONE SODIUM 2 G: 2 INJECTION, POWDER, FOR SOLUTION INTRAMUSCULAR; INTRAVENOUS at 07:00

## 2018-05-31 RX ADMIN — TACROLIMUS 2.5 MG: 0.5 CAPSULE ORAL at 17:14

## 2018-05-31 RX ADMIN — POTASSIUM CHLORIDE 20 MEQ: 1500 TABLET, EXTENDED RELEASE ORAL at 11:54

## 2018-05-31 RX ADMIN — CLONIDINE HYDROCHLORIDE 0.1 MG: 0.1 TABLET ORAL at 09:44

## 2018-05-31 RX ADMIN — CLONIDINE HYDROCHLORIDE 0.1 MG: 0.1 TABLET ORAL at 21:50

## 2018-05-31 RX ADMIN — HEPARIN SODIUM 5000 UNITS: 5000 INJECTION, SOLUTION INTRAVENOUS; SUBCUTANEOUS at 17:15

## 2018-05-31 NOTE — PROVIDER NOTIFICATION
MD Notification    Notified Person: MD    Notified Person Name: Geovanna    Notification Date/Time: 2250    Notification Interaction: Talked with physician    Purpose of Notification: BS increase (361) after administration of insulin correction dose (4 u).    Orders Received: No new insulin orders.  D/C IVF Recheck BS at 200 am, and if BS >300 notify provider.    Comments: IVF d/c'd , passed onto night nurse about rechecking and notifying provider if BS is >300

## 2018-05-31 NOTE — PROGRESS NOTES
[facility]    Hospitalist Progress Note  When I evaluated patient: 05/31/2018    Assessment & Plan    72 year old female with multiple chronic medical problems admitted with aggressive change in mental state for the last 72 hours or so.  She has had a history of recurrent admissions for encephalopathy.     Sepsis due to E. coli bacteremia and catheter associated UTI: Patient with worsening encephalopathy the last 3 days prior to presentation.  UA abnormal on admission, his chronic indwelling Johnson catheter.  -Blood culture and urine culture is positive for E. Coli, repeat blood culture negative  -Organism is pansensitive, continue  Rocephin IV (initially on cefepime)  -Discussed with Dr. Mckenzie from infectious disease, p.o. antibiotic at discharge, total 2 weeks.     Acute metabolic encephalopathy, suspect related to sepsis.  Suspected Seizures  -Patient has had problems with recurrent encephalopathies in the past. Likely multifactorial; initially thought to be due to the septic process, however patient was not improving as expected despite appropriate antibiotics for several days. Initial head CT-negative for acute findings. MRI brain done on 5/28 without acute pathology, however there was significant motion artifact  -Ammonia, TSH within normal limits  -Patient was started on Keppra during the last hospital stay and also dose of her Neurontin was reduced, Trileptal and tramadol was discontinued during last discharge.  -Keppra level high at presentation at 95, restarted on 500 mg twice daily(50% of home dose) on 5/26, subsequently increased to 750 mg twice daily on 5/28. Also loaded with IV fosphenytoin on 5/28, and continued on IV.  -Continuous EEG monitoring without epileptiform discharges. Change antiseizure meds to p.o. if okay with neurology.  - s/p lumbar puncture this, Gram stain without organisms, glucose and protein within range, rest workup pending.  -Discussed with Dr. Choudhury, neurology  this morning.   - Patient is with near normal mentation now.    Acute on chronic hypoxic and hypercapnic respiratory failure.    She does have history of obstructive sleep apnea, VBG demonstrated both hypercapnia and hypoxia at presentation, suspect this was due to encephalopathy and somnolence and decreased respiratory drive/hypoventilation.  -Gas exchange much improved, oxygen weaned off to room air, however needing O2 supplement at night given her history of obstructive sleep apnea.  -Continue CPAP at home setting.    Acute on chronic kidney disease-III  History of kidney transplant.    The patient is on tacrolimus and prednisone PTA, tacrolimus level were supra- therapeutic at presentation; levels improving and was placed on IV tacrolimus.  Now back on her PTA dose, nephrology managing, appreciate assistance.  -Creatinine continues to improve, 1.23 today, likely at baseline  -IV fluids discontinued 5/30 per nephrology.     Diabetes mellitus type 2.  -Hemoglobin A1c 8.8.  PTA is on 22 units of insulin glargine at bedtime.  -Initially was managed with sliding scale insulin, once she was more awake and PO started, resumed resume Lantus at lower dose.  Blood sugar remained elevated now, increase Lantus to 20 units.  -Continue correctional dose insulin.  -Moderate carb diet.    Hypokalemia  -Potassium 3.2 this morning, urine output 500 cc in the last 24 hours.  I will DC replacement protocol given her CKD.   -I will give 40 mEq potassium ×1 PO and recheck potassium level this afternoon.      Hypothyroidism.    -Continue PTA Synthroid is p.o. now.  -TSH within normal limits     Benign essential hypertension.   -PTA on clonidine and labetalol.    -Was on IV metoprolol, changed to labetalol.  -Labetalol increased to her PTA dose, clonidine also resumed 5/31.  -IV hydralazine and labetalol as needed to be given for systolics greater than 180.     Hyperlipidemia.    -Resume pravastatin     Thrombocytopenia  Mild, chronic,  stable.    # Pain Assessment:  Current Pain Score 5/31/2018   Patient currently in pain? denies   Pain score (0-10) -   Pain location -   Pain descriptors -   rFLACC pain score -   CPOT pain score -   - Gem denies pain and is not receiving pain medication.    DVT Prophylaxis: Pneumatic Compression Devices  Code Status: Full Code    Disposition: Expected discharge possibly tomorrow if continues to improve, therapy evals for discharge planning, but patient is mostly wheelchair bound, has poor lift at home, discussed with her  who stated family will be able to help her at home.  Discussed with patient, reviewed with nursing staff.    Sharri Lujan MD  Hospitalist    Interval History    Patient was seen and evaluated, no acute events overnight, started on CPAP.  She reports that she was not able to sleep well because of frequent changing of positions, and every 2 hour position change to prevent decub.  -She remains afebrile, denies dyspnea, no nausea vomiting abdominal pain or diarrhea.     -Data reviewed today: I reviewed all new labs and imaging results over the last 24 hours. I personally reviewed no images today.  Physical Exam   Temp: 98.3  F (36.8  C) Temp src: Oral BP: 159/63 Pulse: 76 Heart Rate: 73 Resp: 16 SpO2: 100 % O2 Device: Nasal cannula Oxygen Delivery: 2 LPM  Vitals:    05/28/18 1300 05/28/18 1615 05/31/18 0712   Weight: 101.9 kg (224 lb 9.6 oz) 101 kg (222 lb 10.6 oz) 102.3 kg (225 lb 8.5 oz)     Vital Signs with Ranges  Temp:  [98.3  F (36.8  C)-98.5  F (36.9  C)] 98.3  F (36.8  C)  Pulse:  [76] 76  Heart Rate:  [65-91] 73  Resp:  [10-18] 16  BP: (115-165)/(61-79) 159/63  SpO2:  [95 %-100 %] 100 %  I/O last 3 completed shifts:  In: 3173.54 [P.O.:1780; I.V.:1393.54]  Out: 515 [Urine:515]    Constitutional: Alert awake, oriented to self place and current date.  HEENT: Moist oral mucosa  Neck- Supple, Good ROM   Respiratory: Bilateral equal air entry, deemed but clear to auscultation,  normal effort of breathing.  Remains on room air.  Cardiovascular: RRR, No murmur.  No tachycardia.  GI: Soft, Non- tender, BS- normoactive  Skin/Integument: Warm and dry, no rashes.  Ecchymosis noted distal to right wrist stable.  MSK: No joint deformity or swelling, trace-1+ edema  Neuro: Pupil equal; about 2 mm bilaterally, cranial nerves II through XII intact.  Motor strength equal and symmetrical in upper extremities. Paraparesis.     Medications           cefTRIAXone  2 g Intravenous Q24H     cloNIDine  0.1 mg Oral BID     famotidine  20 mg Oral Daily     heparin  5,000 Units Subcutaneous Q12H     insulin aspart  1-7 Units Subcutaneous TID AC     insulin aspart  1-5 Units Subcutaneous At Bedtime     insulin glargine  20 Units Subcutaneous At Bedtime     labetalol  150 mg Oral Q12H HO     levETIRAcetam  750 mg Oral BID     levothyroxine  50 mcg Oral QAM AC     potassium chloride  40 mEq Oral Once     predniSONE  5 mg Oral Daily     sodium chloride (PF)  10 mL Intracatheter Q8H     sodium chloride (PF)  3 mL Intracatheter Q8H     tacrolimus  2.5 mg Oral BID IS       Data       Recent Labs  Lab 05/31/18  0830 05/30/18  0426 05/29/18  0440  05/28/18  0520  05/25/18  0126   WBC 6.8 6.2 5.0  < > 4.9  < > 9.1   HGB 10.1* 10.6* 12.3  < > 10.9*  < > 12.9   MCV 94 94 92  < > 93  < > 92   * 175 151  < > Canceled, Test credited  < > 163    144 143  --  142  < > 133   POTASSIUM 3.2* 3.5 4.0  --  4.3  < > 4.8   CHLORIDE 105 108 111*  --  116*  < > 100   CO2 27 28 23  --  16*  < > 24   BUN 27 28 35*  --  49*  < > 49*   CR 1.23* 1.22* 1.39*  --  1.63*  < > 1.71*   ANIONGAP 7 8 9  --  10  < > 9   ROGER 8.4* 8.2* 8.9  --  8.9  < > 9.7   * 184* 160*  --  54*  < > 251*   ALBUMIN  --   --   --   --  2.4*  --  3.0*   PROTTOTAL  --   --   --   --  6.9  --  8.6   BILITOTAL  --   --   --   --  0.3  --  0.5   ALKPHOS  --   --   --   --  59  --  88   ALT  --   --   --   --  14  --  17   AST  --   --   --   --  17  --   8   < > = values in this interval not displayed.    No results found for this or any previous visit (from the past 24 hour(s)).

## 2018-05-31 NOTE — PROGRESS NOTES
VSS. BP elevated 154/62; catapres added. O2 RA 95%. Denies any pain. Up with a lift. Diabetic diet. Full code. Blood sugars 217/208. Johnson catheter; yellow, clear. PT/OT ordered. Midline present.POtassium replaced today for 3.2 but no protocol per doctor. Magnesium 1.6. Skin tears on both hips and coccyx. Coccyx blanchable redness. Baseline numbness and tingling. Sinus rhythm with PVC. Contact precautions in place. Pt up in chair for lunch; back to bed on side claimed her bottom hurt' turn and repo q2hr. 2+ edema RLE/LLE; PCD's on while in bed. Possible discharge tomorrow.

## 2018-05-31 NOTE — PLAN OF CARE
Problem: Patient Care Overview  Goal: Plan of Care/Patient Progress Review  Outcome: Improving  Pt A/O x4. VSS on RA. Up with lift. Turned/repo q 2 -- blanchable redness to coccyx and skin tears to L hip fold, R hip fold, coccyx and R upper leg/buttocks fold. Interdry and antifungal cream to groin/folds. Pt denies pain. Mod carb diet, good appetite, /351 recheck after correction dose 361 --  MD notified and IVF d/c'd and recheck and 0200. 200 ml output from Johnson. Fluids encouraged. PIV SL, R Midline dress CD&I. Band-Aid to puncture site on back CD&I. Contact precautions maintained. Nursing continue to monitor.     1900 flying [employer] nurse notified to assess patency of midline with alteplase -- midline now patent and 2nd dose of alteplase not administered.

## 2018-05-31 NOTE — PLAN OF CARE
Problem: Patient Care Overview  Goal: Plan of Care/Patient Progress Review  Outcome: Improving  Pt A/O x4. VSS on RA. Up with lift. Turned/repo q 2 -- blanchable redness to coccyx and skin tears to L hip fold, R hip fold, coccyx and R upper leg/buttocks fold. Interdry and antifungal cream to groin/folds. Pt denies pain. Mod carb diet, good appetite, BGM every 4 hours. Johnson patent. Fluids encouraged. PIV SL, R Midline dress CD&I. Band-Aid to puncture site on back CD&I.. Need to encourage patient to drink.  Contact precautions maintained. Nursing continue to monitor.

## 2018-05-31 NOTE — PLAN OF CARE
Problem: Patient Care Overview  Goal: Plan of Care/Patient Progress Review  PT:  Discharge Planner PT   Patient plan for discharge: Return home  Current status: Pt admitted with AMS and UTI, sepsis and acute on chronic respiratory failure. Pt is wc bound and transfers with a lift while here and at home, has a christiana lift and uses a wc at home. Family and aide assist with cares. Pt has no skilled inpatient PT or OT needs at this time.  Barriers to return to prior living situation: None  Recommendations for discharge: Return home  Rationale for recommendations: No skilled inpatient PT or OT needs at this time.       Entered by: Izabella Miller 05/31/2018 2:18 PM

## 2018-05-31 NOTE — PLAN OF CARE
Problem: Patient Care Overview  Goal: Plan of Care/Patient Progress Review  Outcome: Improving  Pt A/O x 4. No complaints of pain. LP performed, ruled out meningitis. On RA. Tolerating diet. BM x 2. Johnson in place, encouraging fluid. Repositioned Q 2hrs. Skin tear drsgs changed, CDI. Report given to [address]. All belongings with pt. Will continue to monitor.

## 2018-05-31 NOTE — PROGRESS NOTES
Doing well.  Mental status remains good.  Creatinine remains at baseline, 1.23.  Tacrolimus level ordered for 5/30 AM never delivered to lab.  Will reorder trough Tac level for 6/1 AM.  Agree with tentative plan for discharge tomorrow.    Robin Mejia MD  Nephrology; [employer]  [phone]

## 2018-05-31 NOTE — PROCEDURES
[facility] EEG # -2 (Day 2 of Long-Term Video-EEG Monitoring)    ORDERING PROVIDER:  Miguelito Choudhury MD       START DATE AND TIME:  2018 at 15:38.       END DATE AND TIME:  2018 at 11:56.      DURATION OF RECORDIN hours, 58 minutes.      CLINICAL SUMMARY:  This diagnostic video-EEG monitoring procedure was performed in evaluation of encephalopathy in Gem Jade.  She was reported to be receiving levetiracetam on this day of monitoring.      TECHNICAL SUMMARY:  This continuous EEG monitoring procedure was performed with 23 scalp electrodes in 10-20 system placements, and additional scalp, precordial and other surface electrodes used for electrical referencing and artifact detection.  Video monitoring was utilized and periodically reviewed by EEG technologists and the physician for electroclinical correlation.     INTERICTAL EEG ACTIVITIES:  During waking there was no definite posterior dominant rhythm.  Initially there was ongoing generalized 2-8 Hz theta-delta slowing in waking, with intermixed faster frequencies.  By the end of this day of monitoring, there was predominantly generalized low amplitude 8-10 Hz alpha activity, with occasional 6-8 Hz theta slowing diffusely.  Drowsiness was manifested by increased rhythmicity of background theta activities, with slow lateral eye movements.  No definite periods of slow wave sleep were recorded during the period of monitoring.      No interictal epileptiform abnormalities were observed.      ICTAL RECORDINGS:  No electrographic seizures and no paroxysmal behavioral events were recorded during this day of monitoring.      SUMMARY OF DAY 2 OF VIDEO-EEG MONITORING:    The interictal EEG recording during waking and drowsiness initially was moderately abnormal due to generalized theta-delta slowing during waking, but by the end of this day of monitoring, there was only mild electrographic encephalopathy on the basis of  generalized theta slowing in waking.    No interictal epileptiform abnormalities, no electrographic seizures, and no paroxysmal behavioral events were recorded on this day of monitoring.    Clinical correlation is recommended.   Dimitry Zacarias M.D., Professor of Neurology    Name:     MARYJANE SHAVER   MRN:      [id number]        Account:        [id number]   :      1945           Procedure Date: 2018      Document: [id number]       cc: Miguelito Choudhury MD

## 2018-06-01 VITALS
RESPIRATION RATE: 16 BRPM | OXYGEN SATURATION: 99 % | WEIGHT: 232.14 LBS | HEART RATE: 76 BPM | BODY MASS INDEX: 37.47 KG/M2 | DIASTOLIC BLOOD PRESSURE: 44 MMHG | SYSTOLIC BLOOD PRESSURE: 144 MMHG | TEMPERATURE: 98.5 F

## 2018-06-01 LAB
ANION GAP SERPL CALCULATED.3IONS-SCNC: 7 MMOL/L (ref 3–14)
BUN SERPL-MCNC: 26 MG/DL (ref 7–30)
CALCIUM SERPL-MCNC: 8.4 MG/DL (ref 8.5–10.1)
CHLORIDE SERPL-SCNC: 107 MMOL/L (ref 94–109)
CO2 SERPL-SCNC: 25 MMOL/L (ref 20–32)
CREAT SERPL-MCNC: 1.32 MG/DL (ref 0.52–1.04)
GFR SERPL CREATININE-BSD FRML MDRD: 40 ML/MIN/1.7M2
GLUCOSE BLDC GLUCOMTR-MCNC: 194 MG/DL (ref 70–99)
GLUCOSE BLDC GLUCOMTR-MCNC: 200 MG/DL (ref 70–99)
GLUCOSE SERPL-MCNC: 186 MG/DL (ref 70–99)
POTASSIUM SERPL-SCNC: 3.5 MMOL/L (ref 3.4–5.3)
SODIUM SERPL-SCNC: 139 MMOL/L (ref 133–144)
SPECIMEN SOURCE: NORMAL
TACROLIMUS BLD-MCNC: 3.1 UG/L (ref 5–15)
TME LAST DOSE: ABNORMAL H
WNV RNA SER QL NAA+PROBE: NOT DETECTED

## 2018-06-01 PROCEDURE — 36415 COLL VENOUS BLD VENIPUNCTURE: CPT | Performed by: HOSPITALIST

## 2018-06-01 PROCEDURE — 99239 HOSP IP/OBS DSCHRG MGMT >30: CPT | Performed by: HOSPITALIST

## 2018-06-01 PROCEDURE — 25000132 ZZH RX MED GY IP 250 OP 250 PS 637: Mod: GY | Performed by: HOSPITALIST

## 2018-06-01 PROCEDURE — 25000125 ZZHC RX 250: Performed by: INTERNAL MEDICINE

## 2018-06-01 PROCEDURE — 25000131 ZZH RX MED GY IP 250 OP 636 PS 637: Mod: GY | Performed by: INTERNAL MEDICINE

## 2018-06-01 PROCEDURE — 25000128 H RX IP 250 OP 636: Performed by: INTERNAL MEDICINE

## 2018-06-01 PROCEDURE — 80197 ASSAY OF TACROLIMUS: CPT | Performed by: INTERNAL MEDICINE

## 2018-06-01 PROCEDURE — A9270 NON-COVERED ITEM OR SERVICE: HCPCS | Mod: GY | Performed by: HOSPITALIST

## 2018-06-01 PROCEDURE — 80048 BASIC METABOLIC PNL TOTAL CA: CPT | Performed by: HOSPITALIST

## 2018-06-01 PROCEDURE — 40000239 ZZH STATISTIC VAT ROUNDS

## 2018-06-01 PROCEDURE — 00000146 ZZHCL STATISTIC GLUCOSE BY METER IP

## 2018-06-01 RX ORDER — CEFUROXIME AXETIL 500 MG/1
500 TABLET ORAL 2 TIMES DAILY
Qty: 18 TABLET | Refills: 0 | Status: SHIPPED | OUTPATIENT
Start: 2018-06-01 | End: 2019-02-12

## 2018-06-01 RX ORDER — LEVETIRACETAM 750 MG/1
750 TABLET ORAL 2 TIMES DAILY
Qty: 60 TABLET | Refills: 11 | Status: SHIPPED | OUTPATIENT
Start: 2018-06-01 | End: 2019-05-07

## 2018-06-01 RX ADMIN — TACROLIMUS 2.5 MG: 0.5 CAPSULE ORAL at 09:00

## 2018-06-01 RX ADMIN — CLONIDINE HYDROCHLORIDE 0.1 MG: 0.1 TABLET ORAL at 09:01

## 2018-06-01 RX ADMIN — Medication 150 MG: at 09:00

## 2018-06-01 RX ADMIN — HEPARIN SODIUM 5000 UNITS: 5000 INJECTION, SOLUTION INTRAVENOUS; SUBCUTANEOUS at 06:47

## 2018-06-01 RX ADMIN — CEFTRIAXONE SODIUM 2 G: 2 INJECTION, POWDER, FOR SOLUTION INTRAMUSCULAR; INTRAVENOUS at 06:48

## 2018-06-01 RX ADMIN — PREDNISONE 5 MG: 5 TABLET ORAL at 09:01

## 2018-06-01 RX ADMIN — LEVETIRACETAM 750 MG: 750 TABLET, FILM COATED ORAL at 09:00

## 2018-06-01 RX ADMIN — LEVOTHYROXINE SODIUM 50 MCG: 50 TABLET ORAL at 06:49

## 2018-06-01 NOTE — PLAN OF CARE
Problem: Patient Care Overview  Goal: Plan of Care/Patient Progress Review  Discharge Planner OT   Patient plan for discharge: Home with assist  Current status: Following chart review, pt has no IP OT needs, pt is at baseline for ADLs and functional mobility, uses lift at home, has family/staff assist  Barriers to return to prior living situation: None  Recommendations for discharge: Defer to medical team  Rationale for recommendations: Defer to medical team       Entered by: Cornelia Mcnulty 06/01/2018 12:28 PM

## 2018-06-01 NOTE — PROGRESS NOTES
"SPIRITUAL HEALTH SERVICES  SPIRITUAL ASSESSMENT Progress Note  [facility]    PRIMARY FOCUS:     Emotional/spiritual/Taoism distress    Support for coping    ILLNESS CIRCUMSTANCES:     Context of Serious Illness/Symptom(s) - Pt became encephalopathic after coming down with an infection.    Resources for Support - Pt's family, friends, and anjelica    DISTRESS:     Emotional/Existential/Relational Distress - Pt is upset about current events and politics, and talked about the sadness she feels when she sees people suffering.    Spiritual/Orthodoxy Distress - Pt lost her anjelica community when she went through a divorce 20+ years ago, and was sad when no one from the Religion followed up with her. Pt's current  was raised in a strict Zoroastrianism family and no longer attends Religion. Pt is dependent on her  for care and can not go independently to Religion. Pt misses being a part of a anjelica community.    Social/Cultural/Economic Distress - Pt's mother was interred in a CSDN camp in [place] during WWII, and pt has experienced prejudice due to her [place] heritage    SPIRITUAL/Mosque COPING:     Sabianist/Anjelica - Pt still has a strong Scientology anjelica.    Spiritual Practice(s) - Pt appreciates prayer. Pt would like to go to Religion, but states that she can still experience \"being a child of God\" apart from a Voodoo.    Emotional/Existential/Relational Connections - Pt appreciates practicing kindness and reflecting on the kindness of others in her life. This brings pt happiness when she feels sad about current events in the news. Pt has some friends of her parents who still visit pt and bring her [place] food.    GOALS OF CARE:    Goals of Care - Pt is discharging to home today and is excited to return to her .    Meaning/Sense-Making - Pt is finding hope and purpose in the midst of her medical challenges and discouraging political/social climate.    PLAN:  provided reflective " listening and emotional support.  provided prayer. Pt expressed gratitude for 's visit. SH has no plans to follow due to anticipated discharge.      Cornelia Pyle  Chaplain Resident  Pager: [phone]  Office: [phone]

## 2018-06-01 NOTE — PROGRESS NOTES
Care Transition Initial Assessment - RN        Met with: Patient.  DATA   Active Problems:    UTI (urinary tract infection)       Cognitive Status: awake, alert and oriented.        Contact information and PCP information verified: Yes  Lives With: spouse                    Insurance concerns: No Insurance issues identified  ASSESSMENT  Patient currently receives the following services:  [facility]        Identified issues/concerns regarding health management:  Pt with multiple medical problems was admitted for sepsis related catheter associated UTI, encephalopathy and respiratory failure.  She is discharged to home with her  who cares for her.  She has C and also has hospital bed and 2 christiana lifts.      PLAN  Financial costs for the patient include none .  Patient given options and choices for discharge yes .  Patient/family is agreeable to the plan?  Yes:   Patient anticipates discharging to home with C .        Patient anticipates needs for home equipment: No  Plan/Disposition: Home   Appointments: follow up appt scheduled with PCP for 6/6/18 and has MTM appt scheduled      Care  (CTS) will continue to follow as needed.

## 2018-06-01 NOTE — PLAN OF CARE
Problem: Patient Care Overview  Goal: Plan of Care/Patient Progress Review  Outcome: Improving  Pt A/O x4. VSS on RA. Up with lift. Turned/repo q 2 -- blanchable redness to coccyx and skin tears to L hip fold, R hip fold, coccyx and R upper leg/buttocks fold. Interdry and antifungal cream to groin/folds. Pt denies pain. Mod carb diet, good appetite, /255. Johnson patent. Fluids encouraged. PIV SL, R Midline dress CD&I. Band-Aid to puncture site on back CD&I. Contact precautions maintained. 2L o2 while pt dosing off. CPAP at night. Nursing continue to monitor.

## 2018-06-01 NOTE — PROGRESS NOTES
Discharge    Patient discharged to home via cart with EMS    Listed belongings gathered and returned to patient. Yes  Care Plan and Patient education resolved: Yes  Prescriptions if needed, hard copies sent with patient  Yes  Home and hospital acquired medications returned to patient: NA  Medication Bin checked and emptied on discharge Yes  Follow up appointment made for patient: Yes

## 2018-06-01 NOTE — PLAN OF CARE
Problem: Patient Care Overview  Goal: Plan of Care/Patient Progress Review  Outcome: Declining  Patient alert/orient X4, lungs clear, on RA. Abdomen distended/obese.  Numbness to lower/upper extremities.  Johnson in place/clear/yellow.  Turned Q2 hours while in bed.  Tolerating diet, .  Vss, denied pain.  Mepilex changed to both hips/coccyx.

## 2018-06-01 NOTE — PLAN OF CARE
Problem: Patient Care Overview  Goal: Plan of Care/Patient Progress Review  Outcome: Improving  A&O. VSS on 2L. Blood sugar 194 . Foam dressings to bilateral hips folds and coccyx cdi.  Band aid to lumbar puncture site, cdi. Total care, up with assist of 2 with lift. Johnson patent. Contact precaution maintained. Possibly d/c home today

## 2018-06-02 LAB
BACTERIA SPEC CULT: NO GROWTH
Lab: NORMAL
SPECIMEN SOURCE: NORMAL

## 2018-06-02 NOTE — DISCHARGE SUMMARY
[facility]    Discharge Summary  Hospitalist    Date of Admission:  5/25/2018  Date of Discharge:  6/1/2018  1:11 PM  Discharging Provider: Sharri Lujan MD  Date of Service (when I saw the patient): 06/01/18    Discharge Diagnoses     Sepsis due to E. coli bacteremia and catheter associated UTI    Acute metabolic encephalopathy, suspect related to sepsis    Seizures    Acute on chronic hypoxic and hypercapnic respiratory failure    Acute on chronic kidney disease-III    History of kidney transplant on immunosuppression    Hypothyroidism    Essential HTN    Hypokalemia    Hyperlipidemia    History of Present Illness   Gem Jade is a 72 year old female with multiple chronic medical problems admitted with aggressive change in mental state for the last 72 hours or so.  She has had a history of recurrent admissions for encephalopathy.    Hospital Course   Gem Jade was admitted on 5/25/2018.  The following problems were addressed during her hospitalization:      Sepsis due to E. coli bacteremia and catheter associated UTI: Patient with worsening encephalopathy over the last 3 days prior to presentation. UA abnormal on admission, has chronic indwelling Johnson catheter. Blood culture and urine culture was positive for E. Coli pansensitive, repeat blood culture negative.  On admission, was started on cefepime, subsequently changed to Rocephin.  Given bacteremia, discussed with Dr. Mckenzie from infectious disease, PO antibiotic at discharge-9 more days of p.o. cefuroxime to complete total 2 weeks of abx.      Acute metabolic encephalopathy, suspect related to sepsis.  Seizures  Patient has had problems with recurrent encephalopathies in the past as well and similar presentation.. Suspected multifactorial; initially thought to be due to the septic process, however patient was not improving as expected despite appropriate antibiotics for several days.  Therefore urology service was consulted.   Initial head CT-negative for acute findings. MRI brain done on 5/28 without acute pathology, however there was significant motion artifact  -Ammonia, TSH within normal limits.  -Patient was started on Keppra during the last hospital stay and also dose of her Neurontin was reduced, Trileptal and tramadol  aware discontinued during last discharge.  -Keppra level high at presentation at 95, restarted on 500 mg twice daily (50% of home dose) on 5/26, subsequently increased to 750 mg twice daily on 5/28.   -EEG revealed epileptiform discharges. Patient was loaded with Dilantin as well which was subsequently discontinued.  - s/p lumbar puncture, meningitis was ruled out.  -Her mentation subsequently started improving and she is at normal mentation currently.     Acute on chronic hypoxic and hypercapnic respiratory failure.    She does have history of obstructive sleep apnea, VBG demonstrated both hypercapnia and hypoxia at presentation, suspect this was due to encephalopathy and somnolence and decreased respiratory drive/hypoventilation.  -Gas exchange much improved, oxygen weaned off to room air, however needing O2 supplement at night given her history of obstructive sleep apnea.   -Continued CPAP at home setting.     Acute on chronic kidney disease-III  History of kidney transplant.    The patient is on tacrolimus and prednisone PTA, tacrolimus level were supra- therapeutic at presentation; levels improving and was placed on IV tacrolimus.  Now back on her PTA dose, nephrology managing, appreciate assistance.  -Creatinine continues to improve, 1.23-->1.3 today.   -IV fluids discontinued 5/30 per nephrology.  -needs repeat BMP in a week or so.      Diabetes mellitus type 2.  -Hemoglobin A1c 8.8.  PTA is on 22 units of insulin glargine at bedtime.  -Initially was managed with sliding scale insulin, once she was more awake and PO started, resumed  Lantus at lower dose and dose was uptitrated, resumed home dose at  discharge.  -Insulin sliding scale as well was used.  -Moderate carb diet.     Hypokalemia  -Replaced and corrected.      Hypothyroidism.    -Continue PTA Synthroid is p.o. now.  -TSH within normal limits      Benign essential hypertension.   -PTA on clonidine and labetalol.    -Was on IV metoprolol, changed to labetalol.  -Labetalol increased to her PTA dose, clonidine also resumed 5/31.      Hyperlipidemia.    -Resume pravastatin      Thrombocytopenia  Mild, chronic, stable.    Sharri Lujan MD  Hospitalist    Significant Results and Procedures   -Reports a test for CT scan of the head, MRI brain, chest x-rays  -EEG was done which showed epileptiform discharges.    Pending Results   These results will be followed up by PCP.  Unresulted Labs Ordered in the Past 30 Days of this Admission     Date and Time Order Name Status Description    5/29/2018 1348 CSF Culture Aerobic Bacterial Preliminary     5/27/2018 0730 Blood culture Preliminary           Code Status   Full Code       Primary Care Physician   Marivel Barcenas, ALISON    Constitutional: Alert awake, oriented ×4  HEENT: Moist oral mucosa  Neck: Supple, Good ROM   Respiratory: Bilateral equal air entry, dim but clear to auscultation, normal effort of breathing.  Remains on room air.  Cardiovascular: RRR, No murmur.  No tachycardia.  GI: Soft, Non- tender, BS- normoactive  Skin/Integument: Warm and dry, no rashes.  Ecchymosis noted distal to right wrist stable.  MSK: No joint deformity or swelling, 1+ edema  Neuro: Pupil equal; about 2 mm bilaterally, cranial nerves II through XII intact.  Motor strength equal and symmetrical in upper extremities. Paraplegia    Discharge Disposition   Discharged to home  Condition at discharge: Stable    Consultations This Hospital Stay   PHARMACY IP CONSULT  NEPHROLOGY IP CONSULT  NEPHROLOGY IP CONSULT  NEUROLOGY IP CONSULT  VASCULAR ACCESS ADULT IP CONSULT  WOUND OSTOMY CONTINENCE NURSE  IP CONSULT  VASCULAR ACCESS  ADULT IP CONSULT  PHARMACY IP CONSULT  PHYSICAL THERAPY ADULT IP CONSULT  OCCUPATIONAL THERAPY ADULT IP CONSULT    Time Spent on this Encounter   I, Sharri Lujan, personally saw the patient today and spent greater than 30 minutes discharging this patient.    Discharge Orders     Home care nursing referral     MED THERAPY MANAGE REFERRAL     Reason for your hospital stay   Encephalopathy - due to sepsis and seizure.     Follow-up and recommended labs and tests    Follow up with primary care provider, Marivel Barcenas NP, within 7 days to evaluate medication change and for hospital follow- up.  The following labs/tests are recommended: BMP.     Activity   Your activity upon discharge: activity as tolerated     When to contact your care team   Call your primary doctor if you have any of the following: fever, diarrhea, and if lethargy or confusion noticed by family.     Full Code     Diet   Follow this diet upon discharge: Orders Placed This Encounter     Moderate Consistent CHO Diet       Discharge Medications   Discharge Medication List as of 6/1/2018 12:39 PM      START taking these medications    Details   cefuroxime (CEFTIN) 500 MG tablet Take 1 tablet (500 mg) by mouth 2 times daily for 9 days, Disp-18 tablet, R-0, E-Prescribe         CONTINUE these medications which have CHANGED    Details   levETIRAcetam (KEPPRA) 750 MG tablet Take 1 tablet (750 mg) by mouth 2 times daily, Disp-60 tablet, R-11, E-Prescribe         CONTINUE these medications which have NOT CHANGED    Details   aspirin 81 MG tablet Take 81 mg by mouth daily, Historical      baclofen (LIORESAL) 10 MG tablet Take 1 tablet (10 mg) by mouth 3 times daily as needed for muscle spasms, Disp-90 tablet, R-4, Historical      CloNIDine HCl (CATAPRES PO) Take 0.1 mg by mouth 2 times daily, Historical      Cranberry 400 MG TABS Take 400 mg by mouth 2 times daily, Historical      cyanocobalamin (VITAMIN B12) 1000 MCG/ML injection Inject 1 mL  (1,000 mcg) into the muscle every 30 days, Disp-3 mL, R-3, E-Prescribe      famotidine (PEPCID) 20 MG tablet Take 1 tablet (20 mg) by mouth 2 times daily, Disp-60 tablet, R-1, Historical      gabapentin (NEURONTIN) 300 MG capsule Take 600 mg by mouth 2 times daily , Disp-360 capsule, R-3, Historical      hydrocortisone 1%/eucerin 1% compounded cream Apply topically 2 times dailyDisp-30 g, R-1Local Print      insulin aspart (NOVOLOG FLEXPEN) 100 UNIT/ML injection Inject Subcutaneous 3 times daily (with meals) Sliding scale, Disp-15 mL, R-11, Historical      insulin glargine (LANTUS SOLOSTAR) 100 UNIT/ML injection Inject 22 Units Subcutaneous At Bedtime , Disp-15 mL, R-11, Historical      labetalol (NORMODYNE) 300 MG tablet Take 0.5 tablets (150 mg) by mouth 2 times daily Take 1/2 tablet (150mg) =300mg. Hold for systolic BP less than 120., Disp-120 tablet, R-3, E-Prescribe      Lactobacillus Rhamnosus, GG, (CULTURELL) capsule Take 1 capsule by mouth 2 times daily, Disp-60 capsule, R-11, Local Print      levothyroxine (SYNTHROID/LEVOTHROID) 50 MCG tablet TAKE ONE TABLET BY MOUTH EVERY DAY, Disp-90 tablet, R-3, E-Prescribe      lidocaine (XYLOCAINE) 2 % topical gel Apply topically as needed for moderate pain 10 ML every 3 weeksDisp-30 mL, [id number]      loratadine (CLARITIN) 10 MG tablet Take 1 tablet (10 mg) by mouth daily, Disp-90 tablet, R-3, E-Prescribe      predniSONE (DELTASONE) 5 MG tablet Take 1 tablet (5 mg) by mouth daily, Disp-90 tablet, R-3, E-Prescribe      simethicone (MYLICON) 125 MG CHEW Take 1 tablet (125 mg) by mouth as needed for intestinal gas, Disp-120 tablet, Historical      TACROLIMUS PO Take 2.5 mg by mouth 2 times daily, Historical      VITAMIN D, CHOLECALCIFEROL, PO Take 4,000 Units by mouth 2 times daily, Historical      ACETAMINOPHEN PO Take 325-650 mg by mouth every 4 hours as needed., Historical      pravastatin (PRAVACHOL) 10 MG tablet Take 1 tablet (10 mg) by mouth daily, Disp-90  tablet, R-3, E-Prescribe           Allergies   Allergies   Allergen Reactions     Bactrim Ds [Sulfamethoxazole W/Trimethoprim]      Creatinine level increased     Atorvastatin Calcium      myalgias, muscle aches     Codeine Nausea and Vomiting     Darvocet [Propoxyphene N-Apap] Nausea and Vomiting     Hydromorphone      Levofloxacin Other (See Comments) and Diarrhea     hallucinations     Morphine      Nausea and vomiting     Niaspan [Niacin] GI Disturbance     Flushing even at low dose, GI upset     Percocet [Oxycodone-Acetaminophen]      Vomiting after taking med couple of times     Vicodin [Hydrocodone-Acetaminophen] Nausea and Vomiting     Data   Most Recent 3 CBC's:  Recent Labs   Lab Test  05/31/18   0830  05/30/18   0426  05/29/18   0440   WBC  6.8  6.2  5.0   HGB  10.1*  10.6*  12.3   MCV  94  94  92   PLT  141*  175  151      Most Recent 3 BMP's:  Recent Labs   Lab Test  06/01/18   0845  05/31/18   1505  05/31/18   0830  05/30/18   0426   NA  139   --   139  144   POTASSIUM  3.5  3.5  3.2*  3.5   CHLORIDE  107   --   105  108   CO2  25   --   27  28   BUN  26   --   27  28   CR  1.32*   --   1.23*  1.22*   ANIONGAP  7   --   7  8   ROGER  8.4*   --   8.4*  8.2*   GLC  186*   --   197*  184*     Most Recent 2 LFT's:  Recent Labs   Lab Test  05/28/18   0520  05/25/18   0126   AST  17  8   ALT  14  17   ALKPHOS  59  88   BILITOTAL  0.3  0.5     Most Recent INR's and Anticoagulation Dosing History:  Anticoagulation Dose History     Recent Dosing and Labs Latest Ref Rng & Units 8/31/2016 9/1/2016 9/2/2016 3/8/2017 4/26/2017 4/26/2017 11/6/2017    INR 0.86 - 1.14 0.98 1.13 1.09 1.01 1.06 1.03 1.09        Most Recent 3 Troponin's:  Recent Labs   Lab Test  11/13/17   1613  04/19/17   0005  01/10/16   1408   TROPI  <0.015  <0.015  The 99th percentile for upper reference range is 0.045 ug/L.  Troponin values in   the range of 0.045 - 0.120 ug/L may be associated with risks of adverse   clinical events.    <0.015  The  99th percentile for upper reference range is 0.045 ug/L.  Troponin values in   the range of 0.045 - 0.120 ug/L may be associated with risks of adverse   clinical events.       Most Recent Cholesterol Panel:  Recent Labs   Lab Test  12/05/16   1210   CHOL  164   LDL  64   HDL  46*   TRIG  272*     Most Recent 6 Bacteria Isolates From Any Culture (See EPIC Reports for Culture Details):  Recent Labs   Lab Test  05/30/18   0845  05/27/18   0855  05/25/18   0225  05/25/18   0155  05/25/18   0126  02/09/18   1530   CULT  Culture negative monitoring continues  No growth after 5 days  No growth  >100,000 colonies/mL  Escherichia coli  *  Cultured on the 1st day of incubation:  Escherichia coli  *  Critical Value/Significant Value, preliminary result only, called to and read back by   CONCETTA HILARIO RN [facility] @ 1401 5.25.18 JE    (Note)  POSITIVE for E.COLI by Verigene multiplex nucleic acid test. Final  identification and antimicrobial susceptibility testing will be  verified by standard methods. Verigene test will not distinguish  E.coli from Shigella species including S.dysenteriae, S.flexneri,  S.boydii, and S.sonnei. Specimens containing Shigella species or  E.coli will be reported as Positive for E.coli.    Specimen tested with Verigene multiplex, gram-negative blood culture  nucleic acid test for the following targets: Acinetobacter sp.,  Citrobacter sp., Enterobacter sp., Proteus sp., E. coli, K.  pneumoniae/oxytoca, P. aeruginosa, and the following resistance  markers: CTXM, KPC, NDM, VIM, IMP and OXA.    Critical Value/Significant Value called to and read back by CONCETTA HILARIO RN @1623 5/25/18. CT    >100,000 colonies/mL  Citrobacter freundii complex  *     Most Recent TSH, T4 and A1c Labs:  Recent Labs   Lab Test  05/29/18   0440  05/26/18   0545   03/09/17   1310   TSH  0.85   --    < >  1.20   T4   --    --    --   0.72*   A1C   --   8.8*   < >   --     < > = values in this interval not displayed.      Results for orders placed or performed during the hospital encounter of 05/25/18   XR Chest 2 Views    Narrative    XR CHEST 2 VW  5/25/2018 1:50 AM     HISTORY: AMS, weakness.    COMPARISON: 1/18/2018.    FINDINGS: The heart is enlarged. No pulmonary edema. The thoracic  aorta is calcified. The lungs are clear. No pneumothorax or pleural  effusion.      Impression    IMPRESSION: No acute abnormality.    STEFFANY KAUR MD   Head CT w/o contrast    Narrative    CT SCAN OF THE HEAD WITHOUT CONTRAST   5/25/2018 3:50 AM     HISTORY: Altered mental status. Hypertension.    TECHNIQUE:  Axial images of the head and coronal reformations without  IV contrast material. Radiation dose for this scan was reduced using  automated exposure control, adjustment of the mA and/or kV according  to patient size, or iterative reconstruction technique.    COMPARISON: 1/18/2018.    FINDINGS:  There is generalized atrophy of the brain.  There is low  attenuation in the white matter of the cerebral hemispheres consistent  with sequelae of small vessel ischemic disease. There is no evidence  of intracranial hemorrhage, mass, acute infarct or anomaly.     The visualized portions of the sinuses and mastoids appear normal.  There is no evidence of trauma.      Impression    IMPRESSION: No acute abnormality.      STEFFANY KAUR MD   MR Brain w/o Contrast    Narrative    MRI BRAIN WITHOUT CONTRAST  5/28/2018 11:45 PM    HISTORY:  r/ou stroke, previous L occipital lobe abnorma;ity in  1-2018;     TECHNIQUE:  Multiplanar, multisequence MRI of the brain without  gadolinium IV contrast material.    COMPARISON:  MR scan dated 1/19/2018, CT dated 5/25/2018    FINDINGS:  Motion artifact degrades quality of many of these  sequences. There is generalized atrophy of the brain.  White matter  changes are present in the cerebral hemispheres that are consistent  with small vessel ischemic disease in this age patient. No abnormal  signal is seen in the  occipital-parietal region on this scan but  abnormalities could be obscured by the motion artifact. There is no  evidence of hemorrhage, mass, acute infarct, or anomaly.  The facial  structures appear normal. The arteries at the base of the brain and  the dural venous sinuses appear patent.      Impression    IMPRESSION:    1. No acute pathology is identified. No bleed, mass, or acute infarcts  are seen.  2. No abnormalities are identified in the left parietal-occipital  region on today's study. There however is significant motion artifact  on the T2 sequences and a subtle abnormality could easily be obscured  by the motion artifact.      NAYANA GONSALES MD   XR Chest Port 1 View    Narrative    PORTABLE CHEST ONE VIEW 5/28/2018 6:20 PM     COMPARISON: Two view chest x-ray 5/25/2018    HISTORY: Seizure, acute respiratory failure.    FINDINGS: The cardiac silhouette, pulmonary vasculature, lungs and  pleural spaces are within normal limits.      Impression    IMPRESSION: Clear lungs.    SHAAN SEVILLA MD   XR Lumbar Puncture Spinal Tap Diag    Narrative    EXAM: XR LUMBAR PUNCTURE SPINAL TAP DIAGNOSTIC  5/30/2018 9:04 AM       History:  Altered mental status.     PROCEDURE: The patient consented for a lumbar puncture. The benefits  and risks of the procedure were explained to the patient, including  but not limited to worsening headache, hemorrhage, infection, lower  extremity pain, or nerve root injury. The patient was sterilely  prepped and draped with the patient in the prone position, over the  lower back. Under fluoroscopic guidance, the interlaminar spaces were  noted. 1% lidocaine was administered for local anesthetic over the  L3-4 interlaminar space, and a 22 gauge needle was advanced into the  thecal sac under fluoroscopic guidance. There was initial aspiration  of clear CSF. About 8 cc's total were aspirated.  The needle was  removed. There were no immediate complications associated with the  procedure.        Fluoro time: 0.2 minutes.   Number of Images: 2      Impression    IMPRESSION: Successful lumbar puncture.    EZ NAVARRO PA-C     *Note: Due to a large number of results and/or encounters for the requested time period, some results have not been displayed. A complete set of results can be found in Results Review.

## 2018-06-04 ENCOUNTER — PATIENT OUTREACH (OUTPATIENT)
Dept: CARE COORDINATION | Facility: CLINIC | Age: 73
End: 2018-06-04

## 2018-06-04 ENCOUNTER — TELEPHONE (OUTPATIENT)
Dept: FAMILY MEDICINE | Facility: CLINIC | Age: 73
End: 2018-06-04

## 2018-06-04 DIAGNOSIS — G93.40 ENCEPHALOPATHY ACUTE: Primary | ICD-10-CM

## 2018-06-04 LAB
BACTERIA SPEC CULT: NO GROWTH
SPECIMEN SOURCE: NORMAL

## 2018-06-04 NOTE — TELEPHONE ENCOUNTER
Reason for Call:  Home Health Care    Lisha with [facility] called regarding (reason for call): orders    Orders are needed for this patient.     Skilled Nursinw1, 2w1, with 3 PRN; HHA 2w1    Pt Provider: Klaudia Barcenas    Phone Number Homecare Nurse can be reached at: [phone]     Can we leave a detailed message on this number? YES      Call taken on 2018 at 8:07 AM by Margaret Hoover

## 2018-06-05 NOTE — PROGRESS NOTES
Clinic Care Coordination Contact  Care Team Conversations    Sent email to  home care team asking for contact information for RN case manager working with patient. Patient has phone visit tomorrow with PCP and Pharm D    Porsha Khalil R.N.  Clinic Care Coordinator  [facility]  [phone]

## 2018-06-05 NOTE — PROGRESS NOTES
Clinic Care Coordination Contact  Care Team Conversations    Name: Gem Jade  : 1945  MRN #: [id number]  Primary Care Provider: Marivel Barcenas NP     Primary Clinic: [address] [place] [address]  [facility] [id number]     Reason for Hospitalization:  Acute kidney injury (H) [N17.9]  Acute respiratory failure with hypercapnia (H) [J96.02]  Urinary tract infection without hematuria, site unspecified [N39.0]  Altered mental status, unspecified altered mental status type [R41.82]  Admit Date/Time: 2018 12:54 AM  Discharge Date: 18  Payor Source: Payor: MEDICA / Plan: MEDICA PRIME SOLUTION / Product Type: Indemnity /     Readmission Assessment Measure (CHETAN) Risk Score/category: High         Reason for Communication Hand-off Referral: Fragility  Multiple providers/specialties  Avoidable readmission within 30 days    Discharge Plan: Home with spouse and resumption of HHC       Concern for non-adherence with plan of care:   Y/N no    Already enrolled in Tele-monitoring program and name of program:  Yes, MTM    Follow-up plan:  Future Appointments  Date Time Provider Department Center   2018 11:00 AM Marivel Barcenas APRN [facility]   2018 3:30 PM Conchita [place], [facility]       Any outstanding tests or procedures:        Referrals     Future Labs/Procedures    Home care nursing referral     Comments:    Resumption of home care services    Your provider has ordered home care nursing services. If you have not been contacted within 2 days of your discharge please call the inpatient department phone number at [phone] .    MED THERAPY MANAGE REFERRAL     Comments:    Patient has diagnosis of Diabetes, Heart Failure, COPD, or AMI and is on more than 5 medications            Key Recommendations:  Pt was admitted with sepsis due to catheter related UTI and acute encephalopathy.   She has returned to normal mental status and discharged to home with her  and  resumption of HHC.  She will be on an oral antibiotic to complete 2 week course. Her  provides care and they have a hospital bed and 2 christiana lifts in the home.    Bailey Mares RN Care Coordinator    AVS/Discharge Summary is the source of truth; this is a helpful guide for improved communication of patient story

## 2018-06-06 ENCOUNTER — VIRTUAL VISIT (OUTPATIENT)
Dept: FAMILY MEDICINE | Facility: CLINIC | Age: 73
End: 2018-06-06
Payer: COMMERCIAL

## 2018-06-06 ENCOUNTER — ALLIED HEALTH/NURSE VISIT (OUTPATIENT)
Dept: PHARMACY | Facility: CLINIC | Age: 73
End: 2018-06-06
Payer: COMMERCIAL

## 2018-06-06 ENCOUNTER — TELEPHONE (OUTPATIENT)
Dept: TRANSPLANT | Facility: CLINIC | Age: 73
End: 2018-06-06

## 2018-06-06 DIAGNOSIS — Z09 HOSPITAL DISCHARGE FOLLOW-UP: ICD-10-CM

## 2018-06-06 DIAGNOSIS — R42 VERTIGO: ICD-10-CM

## 2018-06-06 DIAGNOSIS — E11.8 TYPE 2 DIABETES MELLITUS WITH COMPLICATION, WITH LONG-TERM CURRENT USE OF INSULIN (H): ICD-10-CM

## 2018-06-06 DIAGNOSIS — E11.42 DIABETIC POLYNEUROPATHY ASSOCIATED WITH TYPE 2 DIABETES MELLITUS (H): ICD-10-CM

## 2018-06-06 DIAGNOSIS — Z79.4 TYPE 2 DIABETES MELLITUS WITH COMPLICATION, WITH LONG-TERM CURRENT USE OF INSULIN (H): ICD-10-CM

## 2018-06-06 DIAGNOSIS — G40.901 STATUS EPILEPTICUS (H): Primary | ICD-10-CM

## 2018-06-06 DIAGNOSIS — T83.511D URINARY TRACT INFECTION ASSOCIATED WITH INDWELLING URETHRAL CATHETER, SUBSEQUENT ENCOUNTER: Primary | ICD-10-CM

## 2018-06-06 DIAGNOSIS — Z94.0 KIDNEY REPLACED BY TRANSPLANT: ICD-10-CM

## 2018-06-06 DIAGNOSIS — N39.0 URINARY TRACT INFECTION ASSOCIATED WITH INDWELLING URETHRAL CATHETER, SUBSEQUENT ENCOUNTER: Primary | ICD-10-CM

## 2018-06-06 DIAGNOSIS — R56.9 SEIZURES (H): ICD-10-CM

## 2018-06-06 PROCEDURE — 98968 PH1 ASSMT&MGMT NQHP 21-30: CPT | Performed by: NURSE PRACTITIONER

## 2018-06-06 PROCEDURE — 99606 MTMS BY PHARM EST 15 MIN: CPT | Performed by: PHARMACIST

## 2018-06-06 PROCEDURE — 99607 MTMS BY PHARM ADDL 15 MIN: CPT | Performed by: PHARMACIST

## 2018-06-06 RX ORDER — ONDANSETRON 4 MG/1
4 TABLET, ORALLY DISINTEGRATING ORAL DAILY PRN
Qty: 20 TABLET | Refills: 1 | Status: SHIPPED | OUTPATIENT
Start: 2018-06-06 | End: 2019-01-01

## 2018-06-06 NOTE — TELEPHONE ENCOUNTER
Left  for Tamara, gave verbal order to draw all labs on Friday, 6/8/18.  Asked to call tx office back with questions.

## 2018-06-06 NOTE — MR AVS SNAPSHOT
After Visit Summary   6/6/2018    Gem Jade    MRN: [id number]           Patient Information     Date Of Birth          1945        Visit Information        Provider Department      6/6/2018 11:00 AM Marivel Barcenas APRN CNP [facility]        Today's Diagnoses     Status epilepticus (H)    -  1    Diabetic polyneuropathy associated with type 2 diabetes mellitus (H)        Hospital discharge follow-up        Kidney replaced by transplant        Vertigo          Care Instructions    We will have you try Zofran 30 minutes before getting up in your chair     No med changes at this time, continue to watch your diet     We will call you in one month           Follow-ups after your visit        Future tests that were ordered for you today     Open Future Orders        Priority Expected Expires Ordered    **Basic metabolic panel FUTURE anytime Routine 6/8/2018 6/6/2019 6/6/2018    Tacrolimus level Routine 6/8/2018 6/6/2019 6/6/2018            Who to contact     If you have questions or need follow up information about today's clinic visit or your schedule please contact [facility] directly at [phone].  Normal or non-critical lab and imaging results will be communicated to you by TrackaPhonehart, letter or phone within 4 business days after the clinic has received the results. If you do not hear from us within 7 days, please contact the clinic through [place] or phone. If you have a critical or abnormal lab result, we will notify you by phone as soon as possible.  Submit refill requests through Riskthinktank or call your pharmacy and they will forward the refill request to us. Please allow 3 business days for your refill to be completed.          Additional Information About Your Visit        TrackaPhonehart Information     Riskthinktank gives you secure access to your electronic health record. If you see a primary care provider, you can also send  messages to your care team and make appointments. If you have questions, please call your primary care clinic.  If you do not have a primary care provider, please call [phone] and they will assist you.        Care EveryWhere ID     This is your Care EveryWhere ID. This could be used by other organizations to access your [place] medical records  [id number]         Blood Pressure from Last 3 Encounters:   06/01/18 144/44   01/25/18 137/63   01/15/18 156/70    Weight from Last 3 Encounters:   06/01/18 232 lb 2.3 oz (105.3 kg)   01/25/18 233 lb 7.5 oz (105.9 kg)   01/14/18 226 lb 14.4 oz (102.9 kg)                 Today's Medication Changes          These changes are accurate as of 6/6/18 11:59 PM.  If you have any questions, ask your nurse or doctor.               Start taking these medicines.        Dose/Directions    ondansetron 4 MG ODT tab   Commonly known as:  ZOFRAN ODT   Used for:  Vertigo   Started by:  Marivel Barcenas APRN CNP        Dose:  4 mg   Take 1 tablet (4 mg) by mouth daily as needed for nausea 30 minutes before getting up in your wheelchair   Quantity:  20 tablet   Refills:  1         These medicines have changed or have updated prescriptions.        Dose/Directions    hydrocortisone 1%/eucerin 1% compounded cream   This may have changed:    - when to take this  - reasons to take this   Used for:  Rash and nonspecific skin eruption        Apply topically 2 times daily   Quantity:  30 g   Refills:  1            Where to get your medicines      These medications were sent to [facility] - [place], [place] - [address]  [address] [facility], [facility] [address]    Hours:  Mon-Fri 8:30am-5:00pm Toll Free [phone] Phone:  [phone]     ondansetron 4 MG ODT tab                Primary Care Provider Office Phone # Fax #    ANTHONY Antonio [phone] [address] [address]  [facility] [address]         Equal Access to Services     [facility] Center[facility]LEIMAN : Hadii aad ku hadchristinajavier Silviaali, wabettyda luqadaha, qaybta kanavidjessica villalta. So [facility] [phone].    ATENCIÓN: Si habla adelitaañol, tiene a peguero disposición servicios gratuitos de asistencia lingüística. Kishorame al [phone].    We comply with applicable federal civil rights laws and [place] laws. We do not discriminate on the basis of race, color, national origin, age, disability, sex, sexual orientation, or gender identity.            Thank you!     Thank you for choosing [facility]  for your care. Our goal is always to provide you with excellent care. Hearing back from our patients is one way we can continue to improve our services. Please take a few minutes to complete the written survey that you may receive in the mail after your visit with us. Thank you!             Your Updated Medication List - Protect others around you: Learn how to safely use, store and throw away your medicines at www.disposemymeds.org.          This list is accurate as of 6/6/18 11:59 PM.  Always use your most recent med list.                   Brand Name Dispense Instructions for use Diagnosis    ACETAMINOPHEN PO      Take 325-650 mg by mouth every 4 hours as needed.        aspirin 81 MG tablet      Take 81 mg by mouth daily        baclofen 10 MG tablet    LIORESAL    90 tablet    Take 1 tablet (10 mg) by mouth 3 times daily as needed for muscle spasms        CATAPRES PO      Take 0.1 mg by mouth 2 times daily        cefuroxime 500 MG tablet    CEFTIN    18 tablet    Take 1 tablet (500 mg) by mouth 2 times daily for 9 days    E. coli sepsis (H)       Cranberry 400 MG Tabs      Take 400 mg by mouth 2 times daily        cyanocobalamin 1000 MCG/ML injection    VITAMIN B12    3 mL    Inject 1 mL (1,000 mcg) into the muscle every 30 days    Iron deficiency anemia, unspecified iron deficiency anemia type       famotidine 20 MG  tablet    PEPCID    60 tablet    Take 1 tablet (20 mg) by mouth 2 times daily        gabapentin 300 MG capsule    NEURONTIN    360 capsule    Take 600 mg by mouth 2 times daily    Diabetic polyneuropathy associated with type 2 diabetes mellitus (H)       hydrocortisone 1%/eucerin 1% compounded cream     30 g    Apply topically 2 times daily    Rash and nonspecific skin eruption       labetalol 300 MG tablet    NORMODYNE    120 tablet    Take 0.5 tablets (150 mg) by mouth 2 times daily Take 1/2 tablet (150mg) =300mg. Hold for systolic BP less than 120.    Benign essential hypertension, Kidney replaced by transplant       lactobacillus rhamnosus (GG) capsule     60 capsule    Take 1 capsule by mouth 2 times daily    Diarrhea       LANTUS SOLOSTAR 100 UNIT/ML injection   Generic drug:  insulin glargine     15 mL    Inject 22 Units Subcutaneous At Bedtime    Type 2 diabetes mellitus with diabetic polyneuropathy, with long-term current use of insulin (H)       levETIRAcetam 750 MG tablet    KEPPRA    60 tablet    Take 1 tablet (750 mg) by mouth 2 times daily        levothyroxine 50 MCG tablet    SYNTHROID/LEVOTHROID    90 tablet    TAKE ONE TABLET BY MOUTH EVERY DAY    Other specified hypothyroidism       lidocaine 2 % topical gel    XYLOCAINE    30 mL    Apply topically as needed for moderate pain 10 ML every 3 weeks    Johnson catheter in place       loratadine 10 MG tablet    CLARITIN    90 tablet    Take 1 tablet (10 mg) by mouth daily    Acute nasopharyngitis       NovoLOG FLEXPEN 100 UNIT/ML injection   Generic drug:  insulin aspart     15 mL    Inject Subcutaneous 3 times daily (with meals) Sliding scale    Type 2 diabetes mellitus with diabetic polyneuropathy, with long-term current use of insulin (H)       ondansetron 4 MG ODT tab    ZOFRAN ODT    20 tablet    Take 1 tablet (4 mg) by mouth daily as needed for nausea 30 minutes before getting up in your wheelchair    Vertigo       pravastatin 10 MG tablet     PRAVACHOL    90 tablet    Take 1 tablet (10 mg) by mouth daily    Hyperlipidemia LDL goal <100       predniSONE 5 MG tablet    DELTASONE    90 tablet    Take 1 tablet (5 mg) by mouth daily    Kidney replaced by transplant       simethicone 125 MG Chew chewable tablet    MYLICON    120 tablet    Take 1 tablet (125 mg) by mouth as needed for intestinal gas        TACROLIMUS PO      Take 2.5 mg by mouth 2 times daily        VITAMIN D (CHOLECALCIFEROL) PO      Take 4,000 Units by mouth 2 times daily

## 2018-06-06 NOTE — PROGRESS NOTES
SUBJECTIVE/OBJECTIVE:                Gem Jade is a 72 year old female called for a transitions of care visit.  She was discharged from [facility] on 6/1/2018 for sepsis and encephalopathy.   Covisit with PCP.  Keshawn also present on the call.     Chief Complaint: Follow up from our visit on 4/4/18.    Tobacco: History of tobacco dependence - quit  Alcohol: not currently using    Medication Adherence/Access:  Patient has assistance with medication administration and setting up boxes (HCRN).    Sepsis from UTI: Pt is currently taking cefuroxime 500 mg, finishing up course prescribed during hospitalization. Pt reports no diarrhea or other SE. Continue to use probiotic.    Diabetes: Currently taking lantus 22u subQ daily at bedtime and novolog 10u with meals, usually once daily though has been forgetting doses.   SMBG: one to two times daily.  Ranges (patient reported): 150-350 mg/dL. None over 400 mg/dL  Patient is not experiencing hypoglycemia  Recent symptoms of high blood sugar? none  Eye exam: due  Foot exam: due  Microalbumin is not < 30 mg/g. Pt is not taking an ACEi/ARB.  Aspirin: Taking 81mg daily and denies side effects  Diet/Exercise: appetite variable but has improved, eating more carbohydrate. Breakfast consists of cottage cheese, Czech dressing, and fruit. Having regular dinners.  OT begins next week (week of 6/11). PT starts at end of summer 2018    Seizures: Pt is taking levetiracetam 750 mg- 1 tablet twice daily (dose reduction in hospital). Pt/ report mental status has improved, issues resolved more quickly than previous hospitalizations.    Neuropathy: Pt taking gabapentin 300 mg capsules- 2 capsules twice daily, working well and no reported SE.     Vertigo: Reports experiencing vertigo and nausea when getting up in the chair, which has limited her ability to spend time out of bed. She has not been taking any medication recently.     Current labs include:  BP Readings  from Last 3 Encounters:   06/01/18 144/44   01/25/18 137/63   01/15/18 156/70     Today's Vitals: There were no vitals taken for this visit.- phone call  Lab Results   Component Value Date    A1C 8.8 05/26/2018   .  Lab Results   Component Value Date    CHOL 164 12/05/2016     Lab Results   Component Value Date    TRIG 272 12/05/2016     Lab Results   Component Value Date    HDL 46 12/05/2016     Lab Results   Component Value Date    LDL 64 12/05/2016       Liver Function Studies -   Recent Labs   Lab Test  05/28/18   0520   PROTTOTAL  6.9   ALBUMIN  2.4*   BILITOTAL  0.3   ALKPHOS  59   AST  17   ALT  14       Lab Results   Component Value Date    UCRR 43 11/16/2017    MICROL 1850 04/26/2017    UMALCR 6776.56 (H) 04/26/2017       Last Basic Metabolic Panel:  Lab Results   Component Value Date     06/01/2018      Lab Results   Component Value Date    POTASSIUM 3.5 06/01/2018     Lab Results   Component Value Date    CHLORIDE 107 06/01/2018     Lab Results   Component Value Date    BUN 26 06/01/2018     Lab Results   Component Value Date    CR 1.32 06/01/2018     GFR Estimate   Date Value Ref Range Status   06/01/2018 40 (L) >60 mL/min/1.7m2 Final     Comment:     Non  GFR Calc   05/31/2018 43 (L) >60 mL/min/1.7m2 Final     Comment:     Non  GFR Calc   05/30/2018 43 (L) >60 mL/min/1.7m2 Final     Comment:     Non  GFR Calc     GFR Estimate If Black   Date Value Ref Range Status   06/01/2018 48 (L) >60 mL/min/1.7m2 Final     Comment:      GFR Calc   05/31/2018 52 (L) >60 mL/min/1.7m2 Final     Comment:      GFR Calc   05/30/2018 52 (L) >60 mL/min/1.7m2 Final     Comment:      GFR Calc     TSH   Date Value Ref Range Status   05/29/2018 0.85 0.40 - 4.00 mU/L Final   ]    Most Recent Immunizations   Administered Date(s) Administered     HepB 01/13/2003     Influenza (H1N1) 11/25/2009     Influenza (High Dose) 3 valent  vaccine 10/07/2016     Influenza (IIV3) PF 11/05/2011     Influenza Vaccine IM 3yrs+ 4 Valent IIV4 10/16/2013     Pneumo Conj 13-V (2010&after) 12/01/2015     Pneumococcal 23 valent 06/17/2013     TDAP Vaccine (Adacel) 06/17/2013     Zoster vaccine, live 06/17/2013       ASSESSMENT:                 Current medications were reviewed today.     Medication Adherence: needs improvement with Novolog, other medications excellent adherence.    Sepsis from UTI: improved. Continue antibiotic until gone.    Diabetes: Needs improvement. A1c not at goal <8%. Patient will benefit from taking Novolog more regularly with meals.     Seizures: Stable.     Neuropathy: Stable    Vertigo: Needs improvement. Patient may benefit from taking ondansetron 4 mg 30 minutes before sitting up. Will continue to work towards ability to spend 2+ hours in her chair to allow for her to be transported into clinic for a face-to-face visit.     PLAN:                1. Increase adherence to Novolog 10u with meals    3. Take ondansetron 4 mg ODT tablets daily 30 minutes before sitting up PRN     I spent 50 minutes with this patient today. A copy of the visit note was provided to the patient's primary care provider.    Will follow up in 1 month via phone call.    The patient was mailed a summary of these recommendations as an after visit summary    Ellen Bentley, PharmD Candidate   Conchita Toledo, VivianD, BCACP

## 2018-06-06 NOTE — PATIENT INSTRUCTIONS
We will have you try Zofran 30 minutes before getting up in your chair     No med changes at this time, continue to watch your diet     We will call you in one month

## 2018-06-06 NOTE — TELEPHONE ENCOUNTER
Provider Call: Transplant Lab/Orders  Route to LPN  Post Transplant Days: [phone]  When patient is less than 60 days post-transplant, route high priority  Reason for Call: Missing labs/orders; which labs/orders? Pts lab draw today 6/6/18 [facility] lab was not drawn- (If patient is at a clinic without orders, Jerald then page LPN)- Pt just took [place] at 12p, right before labs where to be drawed. Pt then refused all Tx labs (CBC, BMP, ect) . Home [facility] needs one time order to repeat lab draw for Friday 6/8/18.   Liver patients reporting abnormal lab results: Route to RN and Page  Document lab facility information when provider is calling about annual lab orders. Delete facility wildcards when not needed.  Facility [facility]:  [facility]  Call w/ verbal order- number provided  Callback needed? Yes    Return Call Needed  Same as documented in contacts section  When to return call?: Same day: Route High Priority

## 2018-06-06 NOTE — PROGRESS NOTES
"Gem Jade is a 72 year old female who is being evaluated via a billable telephone visit.      The patient has been notified of following:     \"This telephone visit will be conducted via a call between you and your physician/provider. We have found that certain health care needs can be provided without the need for a physical exam.  This service lets us provide the care you need with a short phone conversation.  If a prescription is necessary we can send it directly to your pharmacy.  If lab work is needed we can place an order for that and you can then stop by our lab to have the test done at a later time.    We will bill your insurance company for this service.  Please check with your medical insurance if this type of visit is covered. You may be responsible for the cost of this type of visit if insurance coverage is denied.  The typical cost is $30 (10min), $59 (11-20min) and $85 (21-30min).  Most often these visits are shorter than 10 minutes.    If during the course of the call the physician/provider feels a telephone visit is not appropriate, you will not be charged for this service.\"       Consent has been obtained for this service by care team member: yes. See the scanned image in the medical record.  SUBJECTIVE:     Gem Jade complains of    Chief Complaint   Patient presents with     Hospital F/U       I have reviewed and updated the patient's Past Medical History, Social History, Family History and Medication List.    ALLERGIES  Bactrim ds [sulfamethoxazole w/trimethoprim]; Atorvastatin calcium; Codeine; Darvocet [propoxyphene n-apap]; Hydromorphone; Levofloxacin; Morphine; Niaspan [niacin]; Percocet [oxycodone-acetaminophen]; and Vicodin [hydrocodone-acetaminophen]    Thalia (MA signature)        Hospital Follow-up Visit:    Hospital/Nursing Home/IP Rehab Facility: [facility]  Date of Admission: 5/25/18  Date of Discharge: 6/1/18  Reason(s) for Admission: Sepsis,             " Problems taking medications regularly:  None       Medication changes since discharge: None       Problems adhering to non-medication therapy:  None    Summary of hospitalization:  [employer] discharge summary reviewed  Diagnostic Tests/Treatments reviewed.  Follow up needed: BMP, Tacrolimus   Other Healthcare Providers Involved in Patient s Care:         Homecare  Update since discharge: improved. Will be restarting getting up in her chair soon, OT still coming, PT restart the end of summer, discussed taking Zofran 4mg 30 minutes before getting up in her chair     Post Discharge Medication Reconciliation: discharge medications reconciled, continue medications without change.  Plan of care communicated with patient, family and caregiver     Coding guidelines for this visit:  Type of Medical   Decision Making Face-to-Face Visit       within 7 Days of discharge Face-to-Face Visit        within 14 days of discharge   Moderate Complexity [id number]   High Complexity [id number]           [facility]     Discharge Summary  Hospitalist     Date of Admission:  5/25/2018  Date of Discharge:  6/1/2018  1:11 PM  Discharging Provider: Sharri Lujan MD  Date of Service (when I saw the patient): 06/01/18        Discharge Diagnoses        Sepsis due to E. coli bacteremia and catheter associated UTI    Acute metabolic encephalopathy, suspect related to sepsis    Seizures    Acute on chronic hypoxic and hypercapnic respiratory failure    Acute on chronic kidney disease-III    History of kidney transplant on immunosuppression    Hypothyroidism    Essential HTN    Hypokalemia    Hyperlipidemia        History of Present Illness     Gem Jade is a 72 year old female with multiple chronic medical problems admitted with aggressive change in mental state for the last 72 hours or so.  She has had a history of recurrent admissions for encephalopathy.        Hospital Course     Gem Jade was admitted  on 5/25/2018.  The following problems were addressed during her hospitalization:      Sepsis due to E. coli bacteremia and catheter associated UTI: Patient with worsening encephalopathy over the last 3 days prior to presentation. UA abnormal on admission, has chronic indwelling Johnson catheter. Blood culture and urine culture was positive for E. Coli pansensitive, repeat blood culture negative.  On admission, was started on cefepime, subsequently changed to Rocephin.  Given bacteremia, discussed with Dr. Mckenzie from infectious disease, PO antibiotic at discharge-9 more days of p.o. cefuroxime to complete total 2 weeks of abx.      Acute metabolic encephalopathy, suspect related to sepsis.  Seizures  Patient has had problems with recurrent encephalopathies in the past as well and similar presentation.. Suspected multifactorial; initially thought to be due to the septic process, however patient was not improving as expected despite appropriate antibiotics for several days.  Therefore urology service was consulted.  Initial head CT-negative for acute findings. MRI brain done on 5/28 without acute pathology, however there was significant motion artifact  -Ammonia, TSH within normal limits.  -Patient was started on Keppra during the last hospital stay and also dose of her Neurontin was reduced, Trileptal and tramadol  aware discontinued during last discharge.  -Keppra level high at presentation at 95, restarted on 500 mg twice daily (50% of home dose) on 5/26, subsequently increased to 750 mg twice daily on 5/28.   -EEG revealed epileptiform discharges. Patient was loaded with Dilantin as well which was subsequently discontinued.  - s/p lumbar puncture, meningitis was ruled out.  -Her mentation subsequently started improving and she is at normal mentation currently.      Acute on chronic hypoxic and hypercapnic respiratory failure.    She does have history of obstructive sleep apnea, VBG demonstrated both hypercapnia and  hypoxia at presentation, suspect this was due to encephalopathy and somnolence and decreased respiratory drive/hypoventilation.  -Gas exchange much improved, oxygen weaned off to room air, however needing O2 supplement at night given her history of obstructive sleep apnea.   -Continued CPAP at home setting.      Acute on chronic kidney disease-III  History of kidney transplant.    The patient is on tacrolimus and prednisone PTA, tacrolimus level were supra- therapeutic at presentation; levels improving and was placed on IV tacrolimus.  Now back on her PTA dose, nephrology managing, appreciate assistance.  -Creatinine continues to improve, 1.23-->1.3 today.   -IV fluids discontinued 5/30 per nephrology.  -needs repeat BMP in a week or so.      Diabetes mellitus type 2.  -Hemoglobin A1c 8.8.  PTA is on 22 units of insulin glargine at bedtime.  -Initially was managed with sliding scale insulin, once she was more awake and PO started, resumed  Lantus at lower dose and dose was uptitrated, resumed home dose at discharge.  -Insulin sliding scale as well was used.  -Moderate carb diet.      Hypokalemia  -Replaced and corrected.      Hypothyroidism.    -Continue PTA Synthroid is p.o. now.  -TSH within normal limits      Benign essential hypertension.   -PTA on clonidine and labetalol.    -Was on IV metoprolol, changed to labetalol.  -Labetalol increased to her PTA dose, clonidine also resumed 5/31.      Hyperlipidemia.    -Resume pravastatin       Thrombocytopenia  Mild, chronic, stable.     Sharri Lujan MD  Hospitalist       Diabetes Follow-up      Patient is checking blood sugars: rarely.  Results range from 150 to 350, fasting 159    Using lantus 22u daily, novolog 10u 1x daily, and with a high carb meal     Diabetic concerns: blood sugar frequently over 200     Symptoms of hypoglycemia (low blood sugar): shaky, dizzy, weak     Paresthesias (numbness or burning in feet) or sores: Yes taking Gabapentin      Date of  last diabetic eye exam: unsure    BP Readings from Last 2 Encounters:   06/01/18 144/44   01/25/18 137/63     Hemoglobin A1C (%)   Date Value   05/26/2018 8.8 (H)   01/12/2018 6.3 (H)     LDL Cholesterol Calculated (mg/dL)   Date Value   12/05/2016 64   02/05/2015 18       Amount of exercise or physical activity: 2-3 days/week for an average of 45-60 minutes, up in chair, no weight bearing     Problems taking medications regularly: No    Medication side effects: none    Diet: diabetic         OBJECTIVE:   ASSESSMENT / PLAN:  (G40.901) Status epilepticus (H)  (primary encounter diagnosis)  Comment: stable  Plan: reviewed EEG with patient and her  again, explained medication      (E11.42) Diabetic polyneuropathy associated with type 2 diabetes mellitus (H)  Comment: worse, increase in her HgbA1c to 8.8  Plan: discussed using her novolog more regularly, increase use to 2x daily     (Z09) Hospital discharge follow-up  Comment: improved, mentation at baseline   Plan: labs ordered, repeat BMP and Tacrolimus level     (Z94.0) Kidney replaced by transplant  Comment: labs improving  Plan: **Basic metabolic panel FUTURE anytime,         Tacrolimus level        Repeat labs ordered     (R42) Vertigo  Comment: still a problem when she trys to stay up in her chair  Plan: ondansetron (ZOFRAN ODT) 4 MG ODT tab        Will restart Zofran 4 mg 30 minutes before getting up in her chair       Patient Instructions   We will have you try Zofran 30 minutes before getting up in your chair     No med changes at this time, continue to watch your diet     We have ordered you some labs and will call you with results     We will call you in one month       I have reviewed the note as documented above.  This accurately captures the substance of my conversation with the patient,  Klaudia SandersDiana MTZ      Total time of call between patient and provider was 30 minutes     Thalia Cazares

## 2018-06-06 NOTE — MR AVS SNAPSHOT
After Visit Summary   6/6/2018    Gem Jade    MRN: [id number]           Patient Information     Date Of Birth          1945        Visit Information        Provider Department      6/6/2018 3:30 PM Con[facility]ita [facility]        Today's Diagnoses     Urinary tract infection associated with indwelling urethral catheter, subsequent encounter    -  1    Type 2 diabetes mellitus with complication, with long-term current use of insulin (H)        Seizures (H)        Diabetic polyneuropathy associated with type 2 diabetes mellitus (H)        Vertigo          Care Instructions    See AVS from PCP encounter for details           Follow-ups after your visit        Future tests that were ordered for you today     Open Future Orders        Priority Expected Expires Ordered    **Basic metabolic panel FUTURE anytime Routine 6/8/2018 6/6/2019 6/6/2018    Tacrolimus level Routine 6/8/2018 6/6/2019 6/6/2018            Who to contact     If you have questions or need follow up information about today's clinic visit or your schedule please contact [facility] directly at [phone].  Normal or non-critical lab and imaging results will be communicated to you by [employer], letter or phone within 4 business days after the clinic has received the results. If you do not hear from us within 7 days, please contact the clinic through [employer] or phone. If you have a critical or abnormal lab result, we will notify you by phone as soon as possible.  Submit refill requests through [facility] or call your pharmacy and they will forward the refill request to us. Please allow 3 business days for your refill to be completed.          Additional Information About Your Visit        MyChart Information     [facility] gives you secure access to your electronic health record. If you see a primary care provider, you can also send messages to your care  team and make appointments. If you have questions, please call your primary care clinic.  If you do not have a primary care provider, please call [phone] and they will assist you.        Care EveryWhere ID     This is your Care EveryWhere ID. This could be used by other organizations to access your [place] medical records  [id number]         Blood Pressure from Last 3 Encounters:   06/01/18 144/44   01/25/18 137/63   01/15/18 156/70    Weight from Last 3 Encounters:   06/01/18 232 lb 2.3 oz (105.3 kg)   01/25/18 233 lb 7.5 oz (105.9 kg)   01/14/18 226 lb 14.4 oz (102.9 kg)              Today, you had the following     No orders found for display         Today's Medication Changes          These changes are accurate as of 6/6/18 11:59 PM.  If you have any questions, ask your nurse or doctor.               Start taking these medicines.        Dose/Directions    ondansetron 4 MG ODT tab   Commonly known as:  ZOFRAN ODT   Used for:  Vertigo   Started by:  Marivel Barcenas APRN CNP        Dose:  4 mg   Take 1 tablet (4 mg) by mouth daily as needed for nausea 30 minutes before getting up in your wheelchair   Quantity:  20 tablet   Refills:  1         These medicines have changed or have updated prescriptions.        Dose/Directions    hydrocortisone 1%/eucerin 1% compounded cream   This may have changed:    - when to take this  - reasons to take this   Used for:  Rash and nonspecific skin eruption        Apply topically 2 times daily   Quantity:  30 g   Refills:  1            Where to get your medicines      These medications were sent to [facility] - [place], [place] - [address]   [address] , [facility] [address]    Hours:  Mon-Fri 8:30am-5:00pm Toll Free [phone] Phone:  [phone]     ondansetron 4 MG ODT tab                Primary Care Provider Office Phone # Fax #    ANTHONY Antonio [phone] [address]  [facility] [address]  [facility] [address]        Equal Access to Services     CARLOS ENRIQUE GREEN : Marques Goodman, madhav dai, jessica paz. So [facility] [phone].    ATENCIÓN: Si habla español, tiene a peguero disposición servicios gratuitos de asistencia lingüística. Llame al [phone].    We comply with applicable federal civil rights laws and [place] laws. We do not discriminate on the basis of race, color, national origin, age, disability, sex, sexual orientation, or gender identity.            Thank you!     Thank you for choosing [facility] [place] [facility]  for your care. Our goal is always to provide you with excellent care. Hearing back from our patients is one way we can continue to improve our services. Please take a few minutes to complete the written survey that you may receive in the mail after your visit with us. Thank you!             Your Updated Medication List - Protect others around you: Learn how to safely use, store and throw away your medicines at www.disposemymeds.org.          This list is accurate as of 6/6/18 11:59 PM.  Always use your most recent med list.                   Brand Name Dispense Instructions for use Diagnosis    ACETAMINOPHEN PO      Take 325-650 mg by mouth every 4 hours as needed.        aspirin 81 MG tablet      Take 81 mg by mouth daily        baclofen 10 MG tablet    LIORESAL    90 tablet    Take 1 tablet (10 mg) by mouth 3 times daily as needed for muscle spasms        CATAPRES PO      Take 0.1 mg by mouth 2 times daily        cefuroxime 500 MG tablet    CEFTIN    18 tablet    Take 1 tablet (500 mg) by mouth 2 times daily for 9 days    E. coli sepsis (H)       Cranberry 400 MG Tabs      Take 400 mg by mouth 2 times daily        cyanocobalamin 1000 MCG/ML injection    VITAMIN B12    3 mL    Inject 1 mL (1,000 mcg) into the muscle every 30 days    Iron deficiency anemia, unspecified iron  deficiency anemia type       famotidine 20 MG tablet    PEPCID    60 tablet    Take 1 tablet (20 mg) by mouth 2 times daily        gabapentin 300 MG capsule    NEURONTIN    360 capsule    Take 600 mg by mouth 2 times daily    Diabetic polyneuropathy associated with type 2 diabetes mellitus (H)       hydrocortisone 1%/eucerin 1% compounded cream     30 g    Apply topically 2 times daily    Rash and nonspecific skin eruption       labetalol 300 MG tablet    NORMODYNE    120 tablet    Take 0.5 tablets (150 mg) by mouth 2 times daily Take 1/2 tablet (150mg) =300mg. Hold for systolic BP less than 120.    Benign essential hypertension, Kidney replaced by transplant       lactobacillus rhamnosus (GG) capsule     60 capsule    Take 1 capsule by mouth 2 times daily    Diarrhea       LANTUS SOLOSTAR 100 UNIT/ML injection   Generic drug:  insulin glargine     15 mL    Inject 22 Units Subcutaneous At Bedtime    Type 2 diabetes mellitus with diabetic polyneuropathy, with long-term current use of insulin (H)       levETIRAcetam 750 MG tablet    KEPPRA    60 tablet    Take 1 tablet (750 mg) by mouth 2 times daily        levothyroxine 50 MCG tablet    SYNTHROID/LEVOTHROID    90 tablet    TAKE ONE TABLET BY MOUTH EVERY DAY    Other specified hypothyroidism       lidocaine 2 % topical gel    XYLOCAINE    30 mL    Apply topically as needed for moderate pain 10 ML every 3 weeks    Johnson catheter in place       loratadine 10 MG tablet    CLARITIN    90 tablet    Take 1 tablet (10 mg) by mouth daily    Acute nasopharyngitis       NovoLOG FLEXPEN 100 UNIT/ML injection   Generic drug:  insulin aspart     15 mL    Inject Subcutaneous 3 times daily (with meals) Sliding scale    Type 2 diabetes mellitus with diabetic polyneuropathy, with long-term current use of insulin (H)       ondansetron 4 MG ODT tab    ZOFRAN ODT    20 tablet    Take 1 tablet (4 mg) by mouth daily as needed for nausea 30 minutes before getting up in your wheelchair     Vertigo       pravastatin 10 MG tablet    PRAVACHOL    90 tablet    Take 1 tablet (10 mg) by mouth daily    Hyperlipidemia LDL goal <100       predniSONE 5 MG tablet    DELTASONE    90 tablet    Take 1 tablet (5 mg) by mouth daily    Kidney replaced by transplant       simethicone 125 MG Chew chewable tablet    MYLICON    120 tablet    Take 1 tablet (125 mg) by mouth as needed for intestinal gas        TACROLIMUS PO      Take 2.5 mg by mouth 2 times daily        VITAMIN D (CHOLECALCIFEROL) PO      Take 4,000 Units by mouth 2 times daily

## 2018-06-08 ENCOUNTER — TELEPHONE (OUTPATIENT)
Dept: FAMILY MEDICINE | Facility: CLINIC | Age: 73
End: 2018-06-08

## 2018-06-08 LAB
ANION GAP SERPL CALCULATED.3IONS-SCNC: 6 MMOL/L (ref 3–14)
BASOPHILS # BLD AUTO: 0 10E9/L (ref 0–0.2)
BASOPHILS NFR BLD AUTO: 0.4 %
BUN SERPL-MCNC: 38 MG/DL (ref 7–30)
CALCIUM SERPL-MCNC: 8.8 MG/DL (ref 8.5–10.1)
CHLORIDE SERPL-SCNC: 100 MMOL/L (ref 94–109)
CO2 SERPL-SCNC: 28 MMOL/L (ref 20–32)
CREAT SERPL-MCNC: 1.35 MG/DL (ref 0.52–1.04)
DIFFERENTIAL METHOD BLD: ABNORMAL
EOSINOPHIL # BLD AUTO: 0.1 10E9/L (ref 0–0.7)
EOSINOPHIL NFR BLD AUTO: 2.3 %
ERYTHROCYTE [DISTWIDTH] IN BLOOD BY AUTOMATED COUNT: 13.6 % (ref 10–15)
GFR SERPL CREATININE-BSD FRML MDRD: 39 ML/MIN/1.7M2
GLUCOSE SERPL-MCNC: 386 MG/DL (ref 70–99)
HCT VFR BLD AUTO: 30 % (ref 35–47)
HGB BLD-MCNC: 9.5 G/DL (ref 11.7–15.7)
IMM GRANULOCYTES # BLD: 0 10E9/L (ref 0–0.4)
IMM GRANULOCYTES NFR BLD: 0.6 %
LYMPHOCYTES # BLD AUTO: 1.3 10E9/L (ref 0.8–5.3)
LYMPHOCYTES NFR BLD AUTO: 24.4 %
MCH RBC QN AUTO: 29.7 PG (ref 26.5–33)
MCHC RBC AUTO-ENTMCNC: 31.7 G/DL (ref 31.5–36.5)
MCV RBC AUTO: 94 FL (ref 78–100)
MONOCYTES # BLD AUTO: 0.6 10E9/L (ref 0–1.3)
MONOCYTES NFR BLD AUTO: 11.2 %
NEUTROPHILS # BLD AUTO: 3.2 10E9/L (ref 1.6–8.3)
NEUTROPHILS NFR BLD AUTO: 61.1 %
NRBC # BLD AUTO: 0 10*3/UL
NRBC BLD AUTO-RTO: 0 /100
PLATELET # BLD AUTO: 151 10E9/L (ref 150–450)
POTASSIUM SERPL-SCNC: 4.5 MMOL/L (ref 3.4–5.3)
RBC # BLD AUTO: 3.2 10E12/L (ref 3.8–5.2)
SODIUM SERPL-SCNC: 134 MMOL/L (ref 133–144)
WBC # BLD AUTO: 5.2 10E9/L (ref 4–11)

## 2018-06-08 PROCEDURE — 80048 BASIC METABOLIC PNL TOTAL CA: CPT | Performed by: INTERNAL MEDICINE

## 2018-06-08 PROCEDURE — 80197 ASSAY OF TACROLIMUS: CPT | Performed by: INTERNAL MEDICINE

## 2018-06-08 PROCEDURE — 85025 COMPLETE CBC W/AUTO DIFF WBC: CPT | Performed by: INTERNAL MEDICINE

## 2018-06-08 NOTE — TELEPHONE ENCOUNTER
Reason for Call:  Home Health Care    Richie with [facility] called regarding (reason for call): Orders  Requesting: Ot eval, HHA 2w8 and 1w1, SN 2m2 and 1m1 and 3 PRN    Pt Provider: Klaudia Barcenas    Phone Number Homecare Nurse can be reached at: [phone]    Can we leave a detailed message on this number? YES        Call taken on 6/8/2018 at 12:51 PM by Margaret Hoover

## 2018-06-09 LAB
TACROLIMUS BLD-MCNC: 3.7 UG/L (ref 5–15)
TME LAST DOSE: ABNORMAL H

## 2018-06-13 ENCOUNTER — TELEPHONE (OUTPATIENT)
Dept: FAMILY MEDICINE | Facility: CLINIC | Age: 73
End: 2018-06-13

## 2018-06-13 NOTE — TELEPHONE ENCOUNTER
TC to patient to explain the new [facility] program we are involved in, patient agrees to participate in the program, [facility] will call and set up an appt for next week     Klaudia Barcenas [facility]  MEDICAL LEAD

## 2018-06-28 ENCOUNTER — ALLIED HEALTH/NURSE VISIT (OUTPATIENT)
Dept: BEHAVIORAL HEALTH | Facility: CLINIC | Age: 73
End: 2018-06-28
Payer: COMMERCIAL

## 2018-06-28 DIAGNOSIS — Z00.00 ENCOUNTER FOR ROUTINE ADULT HEALTH EXAMINATION WITHOUT ABNORMAL FINDINGS: Primary | ICD-10-CM

## 2018-06-28 PROCEDURE — 99207 ZZC NO CHARGE NURSE ONLY: CPT

## 2018-06-28 NOTE — MR AVS SNAPSHOT
After Visit Summary   6/28/2018    Gem Jade    MRN: [id number]           Patient Information     Date Of Birth          1945        Visit Information        Provider Department      6/28/2018 12:00 PM [facility]        Today's Diagnoses     Encounter for routine adult health examination without abnormal findings    -  1       Follow-ups after your visit        Who to contact     If you have questions or need follow up information about today's clinic visit or your schedule please contact [facility] directly at [phone].  Normal or non-critical lab and imaging results will be communicated to you by [employer], letter or phone within 4 business days after the clinic has received the results. If you do not hear from us within 7 days, please contact the clinic through [employer] or phone. If you have a critical or abnormal lab result, we will notify you by phone as soon as possible.  Submit refill requests through Weebly or call your pharmacy and they will forward the refill request to us. Please allow 3 business days for your refill to be completed.          Additional Information About Your Visit        MyChart Information     [employer]ly gives you secure access to your electronic health record. If you see a primary care provider, you can also send messages to your care team and make appointments. If you have questions, please call your primary care clinic.  If you do not have a primary care provider, please call [phone] and they will assist you.        Care EveryWhere ID     This is your Care EveryWhere ID. This could be used by other organizations to access your [facility] medical records  [id number]        Your Vitals Were     Pulse Temperature Respirations Pulse Oximetry          62 97.1  F (36.2  C) (Temporal) 16 99%         Blood Pressure from Last 3 Encounters:   06/29/18 (!) 152/0   06/01/18 144/44    01/25/18 137/63    Weight from Last 3 Encounters:   06/01/18 232 lb 2.3 oz (105.3 kg)   01/25/18 233 lb 7.5 oz (105.9 kg)   01/14/18 226 lb 14.4 oz (102.9 kg)              Today, you had the following     No orders found for display         Today's Medication Changes          These changes are accurate as of 6/28/18 11:59 PM.  If you have any questions, ask your nurse or doctor.               These medicines have changed or have updated prescriptions.        Dose/Directions    hydrocortisone 1%/eucerin 1% compounded cream   This may have changed:    - when to take this  - reasons to take this   Used for:  Rash and nonspecific skin eruption        Apply topically 2 times daily   Quantity:  30 g   Refills:  1                Primary Care Provider Office Phone # Fax #    Marivel Naomi Swapna, ANTHONY [facility] [phone]       [address] [facility] [address]  [facility] [address]        Equal Access to Services     CARLOS ENRIQUE GREEN : Hadii jairo green hadasho Soomaali, waaxda luqadaha, qaybta kaalmada adeegyada, jessica aguilar . So [facility] [phone].    ATENCIÓN: Si lacy espchris, tiene a peguero disposición servicios gratuitos de asistencia lingüística. Llame al [phone].    We comply with applicable federal civil rights laws and [place] laws. We do not discriminate on the basis of race, color, national origin, age, disability, sex, sexual orientation, or gender identity.            Thank you!     Thank you for choosing [facility]  for your care. Our goal is always to provide you with excellent care. Hearing back from our patients is one way we can continue to improve our services. Please take a few minutes to complete the written survey that you may receive in the mail after your visit with us. Thank you!             Your Updated Medication List - Protect others around you: Learn how to safely use, store and throw away your medicines at www.disposemymeds.org.           This list is accurate as of 6/28/18 11:59 PM.  Always use your most recent med list.                   Brand Name Dispense Instructions for use Diagnosis    ACCU-CHEK COMPACT PLUS test strip   Generic drug:  blood glucose monitoring     102 each    USE TO TEST BLOOD SUGAR THREE TIMES A DAY OR AS DIRECTED    Diabetic polyneuropathy associated with type 2 diabetes mellitus (H)       ACETAMINOPHEN PO      Take 325-650 mg by mouth every 4 hours as needed.        aspirin 81 MG tablet      Take 81 mg by mouth daily        baclofen 10 MG tablet    LIORESAL    90 tablet    Take 1 tablet (10 mg) by mouth 3 times daily as needed for muscle spasms        CATAPRES PO      Take 0.1 mg by mouth 2 times daily        Cranberry 400 MG Tabs      Take 400 mg by mouth 2 times daily        cyanocobalamin 1000 MCG/ML injection    VITAMIN B12    3 mL    Inject 1 mL (1,000 mcg) into the muscle every 30 days    Iron deficiency anemia, unspecified iron deficiency anemia type       famotidine 20 MG tablet    PEPCID    60 tablet    Take 1 tablet (20 mg) by mouth 2 times daily        gabapentin 300 MG capsule    NEURONTIN    360 capsule    Take 600 mg by mouth 2 times daily    Diabetic polyneuropathy associated with type 2 diabetes mellitus (H)       hydrocortisone 1%/eucerin 1% compounded cream     30 g    Apply topically 2 times daily    Rash and nonspecific skin eruption       labetalol 300 MG tablet    NORMODYNE    120 tablet    Take 0.5 tablets (150 mg) by mouth 2 times daily Take 1/2 tablet (150mg) =300mg. Hold for systolic BP less than 120.    Benign essential hypertension, Kidney replaced by transplant       lactobacillus rhamnosus (GG) capsule     60 capsule    Take 1 capsule by mouth 2 times daily    Diarrhea       LANTUS SOLOSTAR 100 UNIT/ML injection   Generic drug:  insulin glargine     15 mL    Inject 22 Units Subcutaneous At Bedtime    Type 2 diabetes mellitus with diabetic polyneuropathy, with long-term current use of insulin  (H)       levETIRAcetam 750 MG tablet    KEPPRA    60 tablet    Take 1 tablet (750 mg) by mouth 2 times daily        levothyroxine 50 MCG tablet    SYNTHROID/LEVOTHROID    90 tablet    TAKE ONE TABLET BY MOUTH EVERY DAY    Other specified hypothyroidism       lidocaine 2 % topical gel    XYLOCAINE    30 mL    Apply topically as needed for moderate pain 10 ML every 3 weeks    Johnson catheter in place       loratadine 10 MG tablet    CLARITIN    90 tablet    Take 1 tablet (10 mg) by mouth daily    Acute nasopharyngitis       NovoLOG FLEXPEN 100 UNIT/ML injection   Generic drug:  insulin aspart     15 mL    Inject Subcutaneous 3 times daily (with meals) Sliding scale    Type 2 diabetes mellitus with diabetic polyneuropathy, with long-term current use of insulin (H)       ondansetron 4 MG ODT tab    ZOFRAN ODT    20 tablet    Take 1 tablet (4 mg) by mouth daily as needed for nausea 30 minutes before getting up in your wheelchair    Vertigo       pravastatin 10 MG tablet    PRAVACHOL    90 tablet    Take 1 tablet (10 mg) by mouth daily    Hyperlipidemia LDL goal <100       predniSONE 5 MG tablet    DELTASONE    90 tablet    Take 1 tablet (5 mg) by mouth daily    Kidney replaced by transplant       simethicone 125 MG Chew chewable tablet    MYLICON    120 tablet    Take 1 tablet (125 mg) by mouth as needed for intestinal gas        TACROLIMUS PO      Take 2.5 mg by mouth 2 times daily        VITAMIN D (CHOLECALCIFEROL) PO      Take 4,000 Units by mouth 2 times daily

## 2018-06-29 VITALS
TEMPERATURE: 97.1 F | HEART RATE: 62 BPM | SYSTOLIC BLOOD PRESSURE: 152 MMHG | RESPIRATION RATE: 16 BRPM | OXYGEN SATURATION: 99 %

## 2018-06-29 ASSESSMENT — ACTIVITIES OF DAILY LIVING (ADL)
COMMUNICATION: 0 - UNDERSTANDS/COMMUNICATES WITHOUT DIFFICULTY
AMBULATION: 3 - ASSISTIVE EQUIPMENT AND PERSON
EATING: 4 - COMPLETELY DEPENDENT
SWALLOWING: 0-->SWALLOWS FOODS/LIQUIDS WITHOUT DIFFICULTY
TRANSFERRING: 4-->COMPLETELY DEPENDENT
SWALLOWING: 0 - SWALLOWS FOODS/LIQUIDS WITHOUT DIFFICULTY
BATHING: 4-->COMPLETELY DEPENDENT
FALL_HISTORY_WITHIN_LAST_SIX_MONTHS: NO
DRESS: 4 - COMPLETELY DEPENDENT
AMBULATION: 4-->COMPLETELY DEPENDENT
TOILETING: 4-->COMPLETELY DEPENDENT
CHANGE_IN_FUNCTIONAL_STATUS_SINCE_ONSET_OF_CURRENT_ILLNESS/INJURY: NO
COGNITION: 0 - NO COGNITION ISSUES REPORTED
RETIRED_COMMUNICATION: 0-->UNDERSTANDS/COMMUNICATES WITHOUT DIFFICULTY
TOILETING: 4 - COMPLETELY DEPENDENT
RETIRED_EATING: 4-->COMPLETELY DEPENDENT
TRANSFERRING: 4 - COMPLETELY DEPENDENT
DRESS: 4-->COMPLETELY DEPENDENT
BATHING: 4 - COMPLETELY DEPENDENT

## 2018-06-29 ASSESSMENT — PATIENT HEALTH QUESTIONNAIRE - PHQ9
5. POOR APPETITE OR OVEREATING: NOT AT ALL
5. POOR APPETITE OR OVEREATING: NOT AT ALL

## 2018-06-29 ASSESSMENT — ANXIETY QUESTIONNAIRES
GAD7 TOTAL SCORE: 4
IF YOU CHECKED OFF ANY PROBLEMS ON THIS QUESTIONNAIRE, HOW DIFFICULT HAVE THESE PROBLEMS MADE IT FOR YOU TO DO YOUR WORK, TAKE CARE OF THINGS AT HOME, OR GET ALONG WITH OTHER PEOPLE: NOT DIFFICULT AT ALL
5. BEING SO RESTLESS THAT IT IS HARD TO SIT STILL: NOT AT ALL
2. NOT BEING ABLE TO STOP OR CONTROL WORRYING: NOT AT ALL
1. FEELING NERVOUS, ANXIOUS, OR ON EDGE: SEVERAL DAYS
IF YOU CHECKED OFF ANY PROBLEMS ON THIS QUESTIONNAIRE, HOW DIFFICULT HAVE THESE PROBLEMS MADE IT FOR YOU TO DO YOUR WORK, TAKE CARE OF THINGS AT HOME, OR GET ALONG WITH OTHER PEOPLE: NOT DIFFICULT AT ALL
3. WORRYING TOO MUCH ABOUT DIFFERENT THINGS: SEVERAL DAYS
3. WORRYING TOO MUCH ABOUT DIFFERENT THINGS: SEVERAL DAYS
GAD7 TOTAL SCORE: 4
7. FEELING AFRAID AS IF SOMETHING AWFUL MIGHT HAPPEN: MORE THAN HALF THE DAYS
2. NOT BEING ABLE TO STOP OR CONTROL WORRYING: NOT AT ALL
6. BECOMING EASILY ANNOYED OR IRRITABLE: NOT AT ALL
1. FEELING NERVOUS, ANXIOUS, OR ON EDGE: SEVERAL DAYS
5. BEING SO RESTLESS THAT IT IS HARD TO SIT STILL: NOT AT ALL
7. FEELING AFRAID AS IF SOMETHING AWFUL MIGHT HAPPEN: MORE THAN HALF THE DAYS
6. BECOMING EASILY ANNOYED OR IRRITABLE: NOT AT ALL

## 2018-06-29 NOTE — PROGRESS NOTES
SUBJECTIVE:                                                      Gem Jade is a 72 year old female being seen at home for a maintenance visit.    Top medical concerns:   1. None voiced    FUNCTIONAL STATUS:    Standing - Does not stand. Working with PT and OT  Walking - Same as above  Eating - Normal diet  Bathing - PCA and  assist 2x a week  Dressing - Stays in hospital gown. Assist of  and PCA  Toileting - Johnson - changes every 3 weeks. Changed twice last week  Medications -  sets up and distributes  Cooking -    Laundry -   Housework -   Managing finances -       Transportation: Needs specialized transportation      Fall Risk for Medicare Home Care: Yes. 2x a week for bathing, RN- once every 3 weeks. Family-  - daily. Annual Wellness Home safety: Fall Risk Assessment completed per order.    PSYCHOSOCIAL:  Support System Patient/Caregivers: , RN visits and as needed, PCA 2x a week, family  Living Situation:  Lives at home, has hospital bed in family room, WC (when able), home clean      MENTAL HEALTH/COGNITION:   Anxiety: Does have some anxiety in relation to concerns of someone breaking in. Makes her  check door locks multiple times before they go to bed.       Today's PHQ-9 Score:   PHQ-9 SCORE 6/29/2018   Total Score 4        Today's GAD7 score:   LISSY-7 SCORE 6/29/2018   Total Score 4         Patient Active Problem List   Diagnosis     Kidney replaced by transplant     Primary localized osteoarthrosis, lower leg     Pulmonary embolism and infarction (H)     Diabetes mellitus with background retinopathy (H)     TYPE 2 DIABETES, HBA1C GOAL < 8%     HYPERLIPIDEMIA LDL GOAL <100     Diabetic gastroparesis (H)     Back pain     Compression fx, thoracic spine (H)     Hyperkalemia     Anemia     Clostridium difficile diarrhea     Hydronephrosis     Recurrent UTI     Benign essential hypertension     Hyponatremia     Vancomycin resistant  Enterococcus     Clostridium difficile colitis     Diabetic polyneuropathy associated with type 2 diabetes mellitus (H)     Chronic shoulder pain, unspecified laterality     Morbid obesity, unspecified obesity type (H)     Health Care Home     Gastroenteritis     Atrial fibrillation, transient (H)     Altered mental state     Acute kidney injury (H)     Pancytopenia, acquired (H)     Obstructive sleep apnea syndrome     ROB (acute kidney injury) (H)     UTI (urinary tract infection)     Enterococcus faecalis infection     Urinary tract infection     Sepsis (H)     Status epilepticus (H)     Encephalopathy acute         Current Outpatient Prescriptions:      ACCU-CHEK COMPACT PLUS test strip, USE TO TEST BLOOD SUGAR THREE TIMES A DAY OR AS DIRECTED, Disp: 102 each, Rfl: 11     ACETAMINOPHEN PO, Take 325-650 mg by mouth every 4 hours as needed., Disp: , Rfl:      aspirin 81 MG tablet, Take 81 mg by mouth daily, Disp: , Rfl:      baclofen (LIORESAL) 10 MG tablet, Take 1 tablet (10 mg) by mouth 3 times daily as needed for muscle spasms, Disp: 90 tablet, Rfl: 4     CloNIDine HCl (CATAPRES PO), Take 0.1 mg by mouth 2 times daily, Disp: , Rfl:      Cranberry 400 MG TABS, Take 400 mg by mouth 2 times daily, Disp: , Rfl:      cyanocobalamin (VITAMIN B12) 1000 MCG/ML injection, Inject 1 mL (1,000 mcg) into the muscle every 30 days, Disp: 3 mL, Rfl: 3     famotidine (PEPCID) 20 MG tablet, Take 1 tablet (20 mg) by mouth 2 times daily, Disp: 60 tablet, Rfl: 1     gabapentin (NEURONTIN) 300 MG capsule, Take 600 mg by mouth 2 times daily , Disp: 360 capsule, Rfl: 3     hydrocortisone 1%/eucerin 1% compounded cream, Apply topically 2 times daily (Patient taking differently: Apply topically 2 times daily as needed ), Disp: 30 g, Rfl: 1     insulin aspart (NOVOLOG FLEXPEN) 100 UNIT/ML injection, Inject Subcutaneous 3 times daily (with meals) Sliding scale, Disp: 15 mL, Rfl: 11     insulin glargine (LANTUS SOLOSTAR) 100 UNIT/ML  injection, Inject 22 Units Subcutaneous At Bedtime , Disp: 15 mL, Rfl: 11     labetalol (NORMODYNE) 300 MG tablet, Take 0.5 tablets (150 mg) by mouth 2 times daily Take 1/2 tablet (150mg) =300mg. Hold for systolic BP less than 120., Disp: 120 tablet, Rfl: 3     Lactobacillus Rhamnosus, GG, (CULTURELL) capsule, Take 1 capsule by mouth 2 times daily, Disp: 60 capsule, Rfl: 11     levETIRAcetam (KEPPRA) 750 MG tablet, Take 1 tablet (750 mg) by mouth 2 times daily, Disp: 60 tablet, Rfl: 11     levothyroxine (SYNTHROID/LEVOTHROID) 50 MCG tablet, TAKE ONE TABLET BY MOUTH EVERY DAY, Disp: 90 tablet, Rfl: 3     lidocaine (XYLOCAINE) 2 % topical gel, Apply topically as needed for moderate pain 10 ML every 3 weeks, Disp: 30 mL, Rfl: 11     loratadine (CLARITIN) 10 MG tablet, Take 1 tablet (10 mg) by mouth daily, Disp: 90 tablet, Rfl: 3     ondansetron (ZOFRAN ODT) 4 MG ODT tab, Take 1 tablet (4 mg) by mouth daily as needed for nausea 30 minutes before getting up in your wheelchair, Disp: 20 tablet, Rfl: 1     pravastatin (PRAVACHOL) 10 MG tablet, Take 1 tablet (10 mg) by mouth daily, Disp: 90 tablet, Rfl: 3     predniSONE (DELTASONE) 5 MG tablet, Take 1 tablet (5 mg) by mouth daily, Disp: 90 tablet, Rfl: 3     simethicone (MYLICON) 125 MG CHEW, Take 1 tablet (125 mg) by mouth as needed for intestinal gas, Disp: 120 tablet, Rfl:      TACROLIMUS PO, Take 2.5 mg by mouth 2 times daily, Disp: , Rfl:      VITAMIN D, CHOLECALCIFEROL, PO, Take 4,000 Units by mouth 2 times daily, Disp: , Rfl:     Health Maintenance Due: PHQ-9    ASSESSMENT/PLAN:                                                      Vitals: BP (!) 152/0 (BP Location: Left arm, Patient Position: Semi-Mckeon's, Cuff Size: Adult Regular)  Pulse 62  Temp 97.1  F (36.2  C) (Temporal)  Resp 16  SpO2 99%  BMI= There is no height or weight on file to calculate BMI.    Chronic health issues status: Pt is receiving OT. PT is on hold for the summer. No recent UTIs. Pt and  " state that Ceftin worked great for her last time.    Acute issues addressed: None.    Pt states that she \"got a taste\" of getting in a wheelchair and getting outside. Pt states that she is excited and anxious to do it again. Pt states that she is still having issues with severe vertigo when she sits up to far and when she turns her head to the left.     PCP to follow up:     Next CP visit scheduled: TBD. Will bring a new pill organizer.        "

## 2018-06-30 ASSESSMENT — ANXIETY QUESTIONNAIRES: GAD7 TOTAL SCORE: 4

## 2018-06-30 ASSESSMENT — PATIENT HEALTH QUESTIONNAIRE - PHQ9: SUM OF ALL RESPONSES TO PHQ QUESTIONS 1-9: 4

## 2018-07-03 LAB
ANION GAP SERPL CALCULATED.3IONS-SCNC: 5 MMOL/L (ref 3–14)
BASOPHILS # BLD AUTO: 0 10E9/L (ref 0–0.2)
BASOPHILS NFR BLD AUTO: 0.6 %
BUN SERPL-MCNC: 69 MG/DL (ref 7–30)
CALCIUM SERPL-MCNC: 8.9 MG/DL (ref 8.5–10.1)
CHLORIDE SERPL-SCNC: 104 MMOL/L (ref 94–109)
CO2 SERPL-SCNC: 26 MMOL/L (ref 20–32)
CREAT SERPL-MCNC: 1.46 MG/DL (ref 0.52–1.04)
DIFFERENTIAL METHOD BLD: ABNORMAL
EOSINOPHIL # BLD AUTO: 0.2 10E9/L (ref 0–0.7)
EOSINOPHIL NFR BLD AUTO: 3.3 %
ERYTHROCYTE [DISTWIDTH] IN BLOOD BY AUTOMATED COUNT: 14 % (ref 10–15)
GFR SERPL CREATININE-BSD FRML MDRD: 35 ML/MIN/1.7M2
GLUCOSE SERPL-MCNC: 229 MG/DL (ref 70–99)
HCT VFR BLD AUTO: 33.9 % (ref 35–47)
HGB BLD-MCNC: 10.9 G/DL (ref 11.7–15.7)
IMM GRANULOCYTES # BLD: 0 10E9/L (ref 0–0.4)
IMM GRANULOCYTES NFR BLD: 0.5 %
LYMPHOCYTES # BLD AUTO: 2 10E9/L (ref 0.8–5.3)
LYMPHOCYTES NFR BLD AUTO: 32.4 %
MCH RBC QN AUTO: 30.1 PG (ref 26.5–33)
MCHC RBC AUTO-ENTMCNC: 32.2 G/DL (ref 31.5–36.5)
MCV RBC AUTO: 94 FL (ref 78–100)
MONOCYTES # BLD AUTO: 0.5 10E9/L (ref 0–1.3)
MONOCYTES NFR BLD AUTO: 8.1 %
NEUTROPHILS # BLD AUTO: 3.5 10E9/L (ref 1.6–8.3)
NEUTROPHILS NFR BLD AUTO: 55.1 %
NRBC # BLD AUTO: 0 10*3/UL
NRBC BLD AUTO-RTO: 0 /100
PLATELET # BLD AUTO: 150 10E9/L (ref 150–450)
POTASSIUM SERPL-SCNC: 4.7 MMOL/L (ref 3.4–5.3)
RBC # BLD AUTO: 3.62 10E12/L (ref 3.8–5.2)
SODIUM SERPL-SCNC: 135 MMOL/L (ref 133–144)
TACROLIMUS BLD-MCNC: 5.3 UG/L (ref 5–15)
TME LAST DOSE: NORMAL H
WBC # BLD AUTO: 6.3 10E9/L (ref 4–11)

## 2018-07-03 PROCEDURE — 80048 BASIC METABOLIC PNL TOTAL CA: CPT | Performed by: INTERNAL MEDICINE

## 2018-07-03 PROCEDURE — 80197 ASSAY OF TACROLIMUS: CPT | Performed by: INTERNAL MEDICINE

## 2018-07-03 PROCEDURE — 85025 COMPLETE CBC W/AUTO DIFF WBC: CPT | Performed by: INTERNAL MEDICINE

## 2018-07-05 ENCOUNTER — APPOINTMENT (OUTPATIENT)
Dept: GENERAL RADIOLOGY | Facility: CLINIC | Age: 73
DRG: 698 | End: 2018-07-05
Attending: EMERGENCY MEDICINE
Payer: MEDICARE

## 2018-07-05 ENCOUNTER — HOSPITAL ENCOUNTER (INPATIENT)
Facility: CLINIC | Age: 73
LOS: 3 days | Discharge: HOME HEALTH | DRG: 698 | End: 2018-07-08
Attending: EMERGENCY MEDICINE | Admitting: INTERNAL MEDICINE
Payer: MEDICARE

## 2018-07-05 ENCOUNTER — APPOINTMENT (OUTPATIENT)
Dept: CT IMAGING | Facility: CLINIC | Age: 73
DRG: 698 | End: 2018-07-05
Attending: EMERGENCY MEDICINE
Payer: MEDICARE

## 2018-07-05 DIAGNOSIS — T83.511D URINARY TRACT INFECTION ASSOCIATED WITH INDWELLING URETHRAL CATHETER, SUBSEQUENT ENCOUNTER: ICD-10-CM

## 2018-07-05 DIAGNOSIS — E11.3299 DIABETES MELLITUS WITH BACKGROUND RETINOPATHY (H): ICD-10-CM

## 2018-07-05 DIAGNOSIS — Z94.0 KIDNEY REPLACED BY TRANSPLANT: Primary | ICD-10-CM

## 2018-07-05 DIAGNOSIS — M54.9 BACK PAIN, UNSPECIFIED BACK LOCATION, UNSPECIFIED BACK PAIN LATERALITY, UNSPECIFIED CHRONICITY: ICD-10-CM

## 2018-07-05 DIAGNOSIS — G93.40 ENCEPHALOPATHY ACUTE: ICD-10-CM

## 2018-07-05 DIAGNOSIS — N39.0 URINARY TRACT INFECTION ASSOCIATED WITH INDWELLING URETHRAL CATHETER, SUBSEQUENT ENCOUNTER: ICD-10-CM

## 2018-07-05 PROBLEM — A49.8 ENTEROCOCCUS FAECALIS INFECTION: Status: RESOLVED | Noted: 2017-04-20 | Resolved: 2018-07-05

## 2018-07-05 PROBLEM — R41.82 ALTERED MENTAL STATE: Status: ACTIVE | Noted: 2018-07-05

## 2018-07-05 PROBLEM — A41.9 SEPSIS (H): Status: RESOLVED | Noted: 2017-11-13 | Resolved: 2018-07-05

## 2018-07-05 PROBLEM — G40.909 SEIZURE DISORDER (H): Status: ACTIVE | Noted: 2018-07-05

## 2018-07-05 PROBLEM — G93.41 METABOLIC ENCEPHALOPATHY: Status: ACTIVE | Noted: 2018-07-05

## 2018-07-05 PROBLEM — R41.82 ALTERED MENTAL STATE: Status: RESOLVED | Noted: 2018-07-05 | Resolved: 2018-07-05

## 2018-07-05 PROBLEM — G40.901 STATUS EPILEPTICUS (H): Status: RESOLVED | Noted: 2018-01-12 | Resolved: 2018-07-05

## 2018-07-05 PROBLEM — L30.9 DERMATITIS: Status: ACTIVE | Noted: 2018-07-05

## 2018-07-05 PROBLEM — N17.9 AKI (ACUTE KIDNEY INJURY) (H): Status: RESOLVED | Noted: 2017-03-08 | Resolved: 2018-07-05

## 2018-07-05 LAB
ALBUMIN SERPL-MCNC: 3.5 G/DL (ref 3.4–5)
ALBUMIN UR-MCNC: >600 MG/DL
ALP SERPL-CCNC: 91 U/L (ref 40–150)
ALT SERPL W P-5'-P-CCNC: 42 U/L (ref 0–50)
ANION GAP SERPL CALCULATED.3IONS-SCNC: 7 MMOL/L (ref 3–14)
APPEARANCE UR: ABNORMAL
AST SERPL W P-5'-P-CCNC: 27 U/L (ref 0–45)
BASOPHILS # BLD AUTO: 0 10E9/L (ref 0–0.2)
BASOPHILS NFR BLD AUTO: 0.3 %
BILIRUB SERPL-MCNC: 0.3 MG/DL (ref 0.2–1.3)
BILIRUB UR QL STRIP: NEGATIVE
BUN SERPL-MCNC: 60 MG/DL (ref 7–30)
CALCIUM SERPL-MCNC: 9.3 MG/DL (ref 8.5–10.1)
CHLORIDE SERPL-SCNC: 106 MMOL/L (ref 94–109)
CO2 SERPL-SCNC: 27 MMOL/L (ref 20–32)
COLOR UR AUTO: ABNORMAL
CREAT SERPL-MCNC: 1.52 MG/DL (ref 0.52–1.04)
DIFFERENTIAL METHOD BLD: ABNORMAL
EOSINOPHIL # BLD AUTO: 0.2 10E9/L (ref 0–0.7)
EOSINOPHIL NFR BLD AUTO: 3.4 %
ERYTHROCYTE [DISTWIDTH] IN BLOOD BY AUTOMATED COUNT: 14.1 % (ref 10–15)
GFR SERPL CREATININE-BSD FRML MDRD: 34 ML/MIN/1.7M2
GLUCOSE BLDC GLUCOMTR-MCNC: 131 MG/DL (ref 70–99)
GLUCOSE SERPL-MCNC: 224 MG/DL (ref 70–99)
GLUCOSE UR STRIP-MCNC: 100 MG/DL
HCT VFR BLD AUTO: 38.5 % (ref 35–47)
HGB BLD-MCNC: 12.4 G/DL (ref 11.7–15.7)
HGB UR QL STRIP: ABNORMAL
IMM GRANULOCYTES # BLD: 0 10E9/L (ref 0–0.4)
IMM GRANULOCYTES NFR BLD: 0.3 %
INTERPRETATION ECG - MUSE: NORMAL
KETONES UR STRIP-MCNC: NEGATIVE MG/DL
LACTATE BLD-SCNC: 1.4 MMOL/L (ref 0.7–2)
LEUKOCYTE ESTERASE UR QL STRIP: ABNORMAL
LYMPHOCYTES # BLD AUTO: 1.6 10E9/L (ref 0.8–5.3)
LYMPHOCYTES NFR BLD AUTO: 22 %
MAGNESIUM SERPL-MCNC: 2.7 MG/DL (ref 1.6–2.3)
MCH RBC QN AUTO: 30.3 PG (ref 26.5–33)
MCHC RBC AUTO-ENTMCNC: 32.2 G/DL (ref 31.5–36.5)
MCV RBC AUTO: 94 FL (ref 78–100)
MONOCYTES # BLD AUTO: 0.6 10E9/L (ref 0–1.3)
MONOCYTES NFR BLD AUTO: 8.6 %
NEUTROPHILS # BLD AUTO: 4.6 10E9/L (ref 1.6–8.3)
NEUTROPHILS NFR BLD AUTO: 65.4 %
NITRATE UR QL: POSITIVE
PH UR STRIP: 6 PH (ref 5–7)
PLATELET # BLD AUTO: 129 10E9/L (ref 150–450)
POTASSIUM SERPL-SCNC: 5.2 MMOL/L (ref 3.4–5.3)
PROCALCITONIN SERPL-MCNC: 0.07 NG/ML
PROT SERPL-MCNC: 8 G/DL (ref 6.8–8.8)
RBC # BLD AUTO: 4.09 10E12/L (ref 3.8–5.2)
RBC #/AREA URNS AUTO: >182 /HPF (ref 0–2)
SODIUM SERPL-SCNC: 140 MMOL/L (ref 133–144)
SOURCE: ABNORMAL
SP GR UR STRIP: 1.02 (ref 1–1.03)
TROPONIN I SERPL-MCNC: <0.015 UG/L (ref 0–0.04)
UROBILINOGEN UR STRIP-MCNC: 0.2 MG/DL (ref 0–2)
WBC # BLD AUTO: 7.1 10E9/L (ref 4–11)
WBC #/AREA URNS AUTO: >182 /HPF (ref 0–5)
WBC CLUMPS #/AREA URNS HPF: PRESENT /HPF
YEAST #/AREA URNS HPF: ABNORMAL /HPF

## 2018-07-05 PROCEDURE — 25000128 H RX IP 250 OP 636: Performed by: EMERGENCY MEDICINE

## 2018-07-05 PROCEDURE — 80197 ASSAY OF TACROLIMUS: CPT | Performed by: EMERGENCY MEDICINE

## 2018-07-05 PROCEDURE — 51702 INSERT TEMP BLADDER CATH: CPT

## 2018-07-05 PROCEDURE — 83735 ASSAY OF MAGNESIUM: CPT | Performed by: EMERGENCY MEDICINE

## 2018-07-05 PROCEDURE — 99292 CRITICAL CARE ADDL 30 MIN: CPT

## 2018-07-05 PROCEDURE — 96361 HYDRATE IV INFUSION ADD-ON: CPT

## 2018-07-05 PROCEDURE — 25000128 H RX IP 250 OP 636: Performed by: INTERNAL MEDICINE

## 2018-07-05 PROCEDURE — 87186 SC STD MICRODIL/AGAR DIL: CPT | Performed by: EMERGENCY MEDICINE

## 2018-07-05 PROCEDURE — 81001 URINALYSIS AUTO W/SCOPE: CPT | Performed by: EMERGENCY MEDICINE

## 2018-07-05 PROCEDURE — 96365 THER/PROPH/DIAG IV INF INIT: CPT

## 2018-07-05 PROCEDURE — 84484 ASSAY OF TROPONIN QUANT: CPT | Performed by: EMERGENCY MEDICINE

## 2018-07-05 PROCEDURE — 99223 1ST HOSP IP/OBS HIGH 75: CPT | Mod: AI | Performed by: INTERNAL MEDICINE

## 2018-07-05 PROCEDURE — 96375 TX/PRO/DX INJ NEW DRUG ADDON: CPT

## 2018-07-05 PROCEDURE — 93005 ELECTROCARDIOGRAM TRACING: CPT

## 2018-07-05 PROCEDURE — 25000132 ZZH RX MED GY IP 250 OP 250 PS 637: Mod: GY | Performed by: PHYSICIAN ASSISTANT

## 2018-07-05 PROCEDURE — 00000146 ZZHCL STATISTIC GLUCOSE BY METER IP

## 2018-07-05 PROCEDURE — 80053 COMPREHEN METABOLIC PANEL: CPT | Performed by: EMERGENCY MEDICINE

## 2018-07-05 PROCEDURE — 25000128 H RX IP 250 OP 636

## 2018-07-05 PROCEDURE — 70450 CT HEAD/BRAIN W/O DYE: CPT

## 2018-07-05 PROCEDURE — 85025 COMPLETE CBC W/AUTO DIFF WBC: CPT | Performed by: EMERGENCY MEDICINE

## 2018-07-05 PROCEDURE — 12000000 ZZH R&B MED SURG/OB

## 2018-07-05 PROCEDURE — 99207 ZZC APP CREDIT; MD BILLING SHARED VISIT: CPT | Performed by: PHYSICIAN ASSISTANT

## 2018-07-05 PROCEDURE — 87040 BLOOD CULTURE FOR BACTERIA: CPT | Performed by: EMERGENCY MEDICINE

## 2018-07-05 PROCEDURE — 83605 ASSAY OF LACTIC ACID: CPT | Performed by: EMERGENCY MEDICINE

## 2018-07-05 PROCEDURE — 25000131 ZZH RX MED GY IP 250 OP 636 PS 637: Mod: GY | Performed by: PHYSICIAN ASSISTANT

## 2018-07-05 PROCEDURE — 25000128 H RX IP 250 OP 636: Performed by: PHYSICIAN ASSISTANT

## 2018-07-05 PROCEDURE — 87086 URINE CULTURE/COLONY COUNT: CPT | Performed by: EMERGENCY MEDICINE

## 2018-07-05 PROCEDURE — 71045 X-RAY EXAM CHEST 1 VIEW: CPT

## 2018-07-05 PROCEDURE — 84145 PROCALCITONIN (PCT): CPT | Performed by: EMERGENCY MEDICINE

## 2018-07-05 PROCEDURE — 99291 CRITICAL CARE FIRST HOUR: CPT | Mod: 25

## 2018-07-05 PROCEDURE — 87088 URINE BACTERIA CULTURE: CPT | Performed by: EMERGENCY MEDICINE

## 2018-07-05 RX ORDER — ONDANSETRON 4 MG/1
4 TABLET, ORALLY DISINTEGRATING ORAL EVERY 6 HOURS PRN
Status: DISCONTINUED | OUTPATIENT
Start: 2018-07-05 | End: 2018-07-08 | Stop reason: HOSPADM

## 2018-07-05 RX ORDER — NALOXONE HYDROCHLORIDE 0.4 MG/ML
.1-.4 INJECTION, SOLUTION INTRAMUSCULAR; INTRAVENOUS; SUBCUTANEOUS
Status: DISCONTINUED | OUTPATIENT
Start: 2018-07-05 | End: 2018-07-08 | Stop reason: HOSPADM

## 2018-07-05 RX ORDER — PROCHLORPERAZINE MALEATE 5 MG
5 TABLET ORAL EVERY 6 HOURS PRN
Status: DISCONTINUED | OUTPATIENT
Start: 2018-07-05 | End: 2018-07-08 | Stop reason: HOSPADM

## 2018-07-05 RX ORDER — CEFTRIAXONE 2 G/1
2 INJECTION, POWDER, FOR SOLUTION INTRAMUSCULAR; INTRAVENOUS ONCE
Status: COMPLETED | OUTPATIENT
Start: 2018-07-05 | End: 2018-07-05

## 2018-07-05 RX ORDER — LABETALOL HYDROCHLORIDE 5 MG/ML
10 INJECTION, SOLUTION INTRAVENOUS EVERY 6 HOURS
Status: DISCONTINUED | OUTPATIENT
Start: 2018-07-05 | End: 2018-07-07

## 2018-07-05 RX ORDER — ONDANSETRON 2 MG/ML
4 INJECTION INTRAMUSCULAR; INTRAVENOUS EVERY 6 HOURS PRN
Status: DISCONTINUED | OUTPATIENT
Start: 2018-07-05 | End: 2018-07-08 | Stop reason: HOSPADM

## 2018-07-05 RX ORDER — MAGNESIUM SULFATE HEPTAHYDRATE 40 MG/ML
4 INJECTION, SOLUTION INTRAVENOUS EVERY 4 HOURS PRN
Status: DISCONTINUED | OUTPATIENT
Start: 2018-07-05 | End: 2018-07-08 | Stop reason: HOSPADM

## 2018-07-05 RX ORDER — POTASSIUM CL/LIDO/0.9 % NACL 10MEQ/0.1L
10 INTRAVENOUS SOLUTION, PIGGYBACK (ML) INTRAVENOUS
Status: DISCONTINUED | OUTPATIENT
Start: 2018-07-05 | End: 2018-07-05

## 2018-07-05 RX ORDER — SODIUM CHLORIDE 9 MG/ML
INJECTION, SOLUTION INTRAVENOUS CONTINUOUS
Status: DISCONTINUED | OUTPATIENT
Start: 2018-07-05 | End: 2018-07-06

## 2018-07-05 RX ORDER — ACETAMINOPHEN 650 MG/1
650 SUPPOSITORY RECTAL EVERY 4 HOURS PRN
Status: DISCONTINUED | OUTPATIENT
Start: 2018-07-05 | End: 2018-07-08 | Stop reason: HOSPADM

## 2018-07-05 RX ORDER — POTASSIUM CHLORIDE 29.8 MG/ML
20 INJECTION INTRAVENOUS
Status: DISCONTINUED | OUTPATIENT
Start: 2018-07-05 | End: 2018-07-05

## 2018-07-05 RX ORDER — PREDNISONE 5 MG/1
5 TABLET ORAL DAILY
Status: DISCONTINUED | OUTPATIENT
Start: 2018-07-07 | End: 2018-07-06

## 2018-07-05 RX ORDER — PROCHLORPERAZINE 25 MG
12.5 SUPPOSITORY, RECTAL RECTAL EVERY 12 HOURS PRN
Status: DISCONTINUED | OUTPATIENT
Start: 2018-07-05 | End: 2018-07-08 | Stop reason: HOSPADM

## 2018-07-05 RX ORDER — POTASSIUM CHLORIDE 1500 MG/1
20-40 TABLET, EXTENDED RELEASE ORAL
Status: DISCONTINUED | OUTPATIENT
Start: 2018-07-05 | End: 2018-07-05

## 2018-07-05 RX ORDER — NICOTINE POLACRILEX 4 MG
15-30 LOZENGE BUCCAL
Status: DISCONTINUED | OUTPATIENT
Start: 2018-07-05 | End: 2018-07-08 | Stop reason: HOSPADM

## 2018-07-05 RX ORDER — CEFEPIME HYDROCHLORIDE 1 G/1
1 INJECTION, POWDER, FOR SOLUTION INTRAMUSCULAR; INTRAVENOUS EVERY 12 HOURS
Status: DISCONTINUED | OUTPATIENT
Start: 2018-07-05 | End: 2018-07-08 | Stop reason: HOSPADM

## 2018-07-05 RX ORDER — POTASSIUM CHLORIDE 7.45 MG/ML
10 INJECTION INTRAVENOUS
Status: DISCONTINUED | OUTPATIENT
Start: 2018-07-05 | End: 2018-07-05

## 2018-07-05 RX ORDER — POTASSIUM CHLORIDE 1.5 G/1.58G
20-40 POWDER, FOR SOLUTION ORAL
Status: DISCONTINUED | OUTPATIENT
Start: 2018-07-05 | End: 2018-07-05

## 2018-07-05 RX ORDER — DEXTROSE MONOHYDRATE 25 G/50ML
25-50 INJECTION, SOLUTION INTRAVENOUS
Status: DISCONTINUED | OUTPATIENT
Start: 2018-07-05 | End: 2018-07-08 | Stop reason: HOSPADM

## 2018-07-05 RX ADMIN — SODIUM CHLORIDE: 9 INJECTION, SOLUTION INTRAVENOUS at 20:50

## 2018-07-05 RX ADMIN — HYDROCORTISONE SODIUM SUCCINATE 50 MG: 100 INJECTION, POWDER, FOR SOLUTION INTRAMUSCULAR; INTRAVENOUS at 20:06

## 2018-07-05 RX ADMIN — MICONAZOLE NITRATE: 2 POWDER TOPICAL at 22:07

## 2018-07-05 RX ADMIN — CEFTRIAXONE SODIUM 2 G: 2 INJECTION, POWDER, FOR SOLUTION INTRAMUSCULAR; INTRAVENOUS at 17:38

## 2018-07-05 RX ADMIN — SODIUM CHLORIDE 1000 ML: 9 INJECTION, SOLUTION INTRAVENOUS at 15:00

## 2018-07-05 RX ADMIN — CEFEPIME HYDROCHLORIDE 1 G: 1 INJECTION, POWDER, FOR SOLUTION INTRAMUSCULAR; INTRAVENOUS at 22:04

## 2018-07-05 RX ADMIN — LEVETIRACETAM 750 MG: 100 INJECTION, SOLUTION INTRAVENOUS at 20:48

## 2018-07-05 RX ADMIN — INSULIN GLARGINE 11 UNITS: 100 INJECTION, SOLUTION SUBCUTANEOUS at 22:06

## 2018-07-05 RX ADMIN — ENOXAPARIN SODIUM 40 MG: 40 INJECTION SUBCUTANEOUS at 20:11

## 2018-07-05 RX ADMIN — LABETALOL HYDROCHLORIDE 10 MG: 5 INJECTION, SOLUTION INTRAVENOUS at 20:06

## 2018-07-05 RX ADMIN — VANCOMYCIN HYDROCHLORIDE 2500 MG: 5 INJECTION, POWDER, LYOPHILIZED, FOR SOLUTION INTRAVENOUS at 18:08

## 2018-07-05 ASSESSMENT — ENCOUNTER SYMPTOMS: ACTIVITY CHANGE: 1

## 2018-07-05 NOTE — ED NOTES
Bed: ST02  Expected date:   Expected time:   Means of arrival:   Comments:  Jaimee 89 AMS responsive to pain and some verbal

## 2018-07-05 NOTE — IP AVS SNAPSHOT
MRN:[id number]                      After Visit Summary   7/5/2018    Gem Jade    MRN: [id number]           Thank you!     Thank you for choosing [facility] for your care. Our goal is always to provide you with excellent care. Hearing back from our patients is one way we can continue to improve our services. Please take a few minutes to complete the written survey that you may receive in the mail after you visit with us. Thank you!        Patient Information     Date Of Birth          1945        Designated Caregiver       Most Recent Value    Caregiver    Will someone help with your care after discharge? yes    Name of designated caregiver Keshawn    Phone number of caregiver refer to chart    Caregiver address refer to chart      About your hospital stay     You were admitted on:  July 5, 2018 You last received care in the:  [place] [address] [facility]    You were discharged on:  July 8, 2018        Reason for your hospital stay       Urinary tract infection                  Who to Call     For medical emergencies, please call 911.  For non-urgent questions about your medical care, please call your primary care provider or clinic, [phone]          Attending Provider     Provider Specialty    Eunice Prieto MD Emergency Medicine    [facility], Gregg Nuñez MD Internal Medicine       Primary Care Provider Office Phone # Fax #    [facility] [phone]      After Care Instructions     Activity       Your activity upon discharge: activity as tolerated            Diet       Follow this diet upon discharge: Orders Placed This Encounter      Renal Diet (non-dialysis)                  Follow-up Appointments     Follow-up and recommended labs and tests        Follow up with primary care provider, Marivel Barcenas NP, within 7 days for hospital follow- up.  No follow up labs or test are needed.                 "  Further instructions from your care team       [facility] RN and Home Health Aide. The office will santiago to schedule their visits. Their phone number is [phone].    Pending Results     Date and Time Order Name Status Description    7/8/2018 0000 Tacrolimus level In process     7/5/2018 1500 Blood culture Preliminary     7/5/2018 1411 Blood culture Preliminary             Statement of Approval     Ordered          07/08/18 1215  I have reviewed and agree with all the recommendations and orders detailed in this document.  EFFECTIVE NOW     Approved and electronically signed by:  Mark Anthony Rutherford MD             Admission Information     Date & Time Provider Department Dept. Phone    7/5/2018 Gregg Knott MD [place] [address] [facility] [phone]      Your Vitals Were     Blood Pressure Pulse Temperature Respirations Height Weight    193/98 (BP Location: Right arm) 78 98.2  F (36.8  C) (Oral) 16 1.676 m (5' 6\") 101.6 kg (223 lb 15.8 oz)    Pulse Oximetry BMI (Body Mass Index)                96% 36.15 kg/m2          MyChart Information     [employer] gives you secure access to your electronic health record. If you see a primary care provider, you can also send messages to your care team and make appointments. If you have questions, please call your primary care clinic.  If you do not have a primary care provider, please call [phone] and they will assist you.        Care EveryWhere ID     This is your Care EveryWhere ID. This could be used by other organizations to access your [facility] medical records  [id number]        Equal Access to Services     [facility] AH: Hadii jairo hurdo Sokaleigh, waaxda luqadaha, qaybta kaalmada adeblaineyada, jessica roman. So [facility] [phone].    ATENCIÓN: Si habla español, tiene a peguero disposición servicios gratuitos de asistencia lingüística. Llame al [phone].    We comply with applicable " federal civil rights laws and [place] laws. We do not discriminate on the basis of race, color, national origin, age, disability, sex, sexual orientation, or gender identity.               Review of your medicines      START taking        Dose / Directions    ciprofloxacin 500 MG tablet   Commonly known as:  CIPRO   Used for:  Urinary tract infection associated with indwelling urethral catheter, subsequent encounter        Dose:  500 mg   Take 1 tablet (500 mg) by mouth 2 times daily for 10 days   Quantity:  20 tablet   Refills:  0         CONTINUE these medicines which may have CHANGED, or have new prescriptions. If we are uncertain of the size of tablets/capsules you have at home, strength may be listed as something that might have changed.        Dose / Directions    hydrocortisone 1%/eucerin 1% compounded cream   This may have changed:    - when to take this  - reasons to take this   Used for:  Rash and nonspecific skin eruption        Apply topically 2 times daily   Quantity:  30 g   Refills:  1         CONTINUE these medicines which have NOT CHANGED        Dose / Directions    ACCU-CHEK COMPACT PLUS test strip   Used for:  Diabetic polyneuropathy associated with type 2 diabetes mellitus (H)   Generic drug:  blood glucose monitoring        USE TO TEST BLOOD SUGAR THREE TIMES A DAY OR AS DIRECTED   Quantity:  102 each   Refills:  11       ACETAMINOPHEN PO        Dose:  325-650 mg   Take 325-650 mg by mouth every 4 hours as needed.   Refills:  0       aspirin 81 MG tablet        Dose:  81 mg   Take 81 mg by mouth daily   Refills:  0       baclofen 10 MG tablet   Commonly known as:  LIORESAL        Dose:  10 mg   Take 1 tablet (10 mg) by mouth 3 times daily as needed for muscle spasms   Quantity:  90 tablet   Refills:  4       CATAPRES PO        Dose:  0.1 mg   Take 0.1 mg by mouth 2 times daily   Refills:  0       Cranberry 400 MG Tabs        Dose:  400 mg   Take 400 mg by mouth 2 times daily   Refills:  0        cyanocobalamin 1000 MCG/ML injection   Commonly known as:  VITAMIN B12   Used for:  Iron deficiency anemia, unspecified iron deficiency anemia type        Dose:  1000 mcg   Inject 1 mL (1,000 mcg) into the muscle every 30 days   Quantity:  3 mL   Refills:  3       famotidine 20 MG tablet   Commonly known as:  PEPCID        Dose:  20 mg   Take 1 tablet (20 mg) by mouth 2 times daily   Quantity:  60 tablet   Refills:  1       gabapentin 300 MG capsule   Commonly known as:  NEURONTIN   Used for:  Diabetic polyneuropathy associated with type 2 diabetes mellitus (H)        Dose:  600 mg   Take 600 mg by mouth 2 times daily   Quantity:  360 capsule   Refills:  3       labetalol 300 MG tablet   Commonly known as:  NORMODYNE   Used for:  Benign essential hypertension, Kidney replaced by transplant        Dose:  150 mg   Take 0.5 tablets (150 mg) by mouth 2 times daily Take 1/2 tablet (150mg) =300mg. Hold for systolic BP less than 120.   Quantity:  120 tablet   Refills:  3       lactobacillus rhamnosus (GG) capsule   Used for:  Diarrhea        Dose:  1 capsule   Take 1 capsule by mouth 2 times daily   Quantity:  60 capsule   Refills:  11       LANTUS SOLOSTAR 100 UNIT/ML injection   Used for:  Type 2 diabetes mellitus with diabetic polyneuropathy, with long-term current use of insulin (H)   Generic drug:  insulin glargine        Dose:  22 Units   Inject 22 Units Subcutaneous At Bedtime   Quantity:  15 mL   Refills:  11       levETIRAcetam 750 MG tablet   Commonly known as:  KEPPRA        Dose:  750 mg   Take 1 tablet (750 mg) by mouth 2 times daily   Quantity:  60 tablet   Refills:  11       levothyroxine 50 MCG tablet   Commonly known as:  SYNTHROID/LEVOTHROID   Used for:  Other specified hypothyroidism        TAKE ONE TABLET BY MOUTH EVERY DAY   Quantity:  90 tablet   Refills:  3       lidocaine 2 % topical gel   Commonly known as:  XYLOCAINE   Used for:  Johnson catheter in place        Apply topically as needed for  moderate pain 10 ML every 3 weeks   Quantity:  30 mL   Refills:  11       loratadine 10 MG tablet   Commonly known as:  CLARITIN   Used for:  Acute nasopharyngitis        Dose:  10 mg   Take 1 tablet (10 mg) by mouth daily   Quantity:  90 tablet   Refills:  3       NovoLOG FLEXPEN 100 UNIT/ML injection   Used for:  Type 2 diabetes mellitus with diabetic polyneuropathy, with long-term current use of insulin (H)   Generic drug:  insulin aspart        Inject Subcutaneous 3 times daily (with meals) Sliding scale   Quantity:  15 mL   Refills:  11       ondansetron 4 MG ODT tab   Commonly known as:  ZOFRAN ODT   Used for:  Vertigo        Dose:  4 mg   Take 1 tablet (4 mg) by mouth daily as needed for nausea 30 minutes before getting up in your wheelchair   Quantity:  20 tablet   Refills:  1       pravastatin 10 MG tablet   Commonly known as:  PRAVACHOL   Used for:  Hyperlipidemia LDL goal <100        Dose:  10 mg   Take 1 tablet (10 mg) by mouth daily   Quantity:  90 tablet   Refills:  3       predniSONE 5 MG tablet   Commonly known as:  DELTASONE   Used for:  Kidney replaced by transplant        Dose:  5 mg   Take 1 tablet (5 mg) by mouth daily   Quantity:  90 tablet   Refills:  3       simethicone 125 MG Chew chewable tablet   Commonly known as:  MYLICON        Dose:  125 mg   Take 1 tablet (125 mg) by mouth as needed for intestinal gas   Quantity:  120 tablet   Refills:  0       TACROLIMUS PO        Dose:  2.5 mg   Take 2.5 mg by mouth 2 times daily   Refills:  0       VITAMIN D (CHOLECALCIFEROL) PO        Dose:  4000 Units   Take 4,000 Units by mouth 2 times daily   Refills:  0            Where to get your medicines      These medications were sent to [facility] - [address]  [address] [place] [address], [place] [address]     Phone:  [phone]     ciprofloxacin 500 MG tablet                Protect others around you: Learn how to safely use, store and throw away your medicines at  www.disposemymeds.org.        ANTIBIOTIC INSTRUCTION     You've Been Prescribed an Antibiotic - Now What?  Your healthcare team thinks that you or your loved one might have an infection. Some infections can be treated with antibiotics, which are powerful, life-saving drugs. Like all medications, antibiotics have side effects and should only be used when necessary. There are some important things you should know about your antibiotic treatment.      Your healthcare team may run tests before you start taking an antibiotic.    Your team may take samples (e.g., from your blood, urine or other areas) to run tests to look for bacteria. These test can be important to determine if you need an antibiotic at all and, if you do, which antibiotic will work best.      Within a few days, your healthcare team might change or even stop your antibiotic.    Your team may start you on an antibiotic while they are working to find out what is making you sick.    Your team might change your antibiotic because test results show that a different antibiotic would be better to treat your infection.    In some cases, once your team has more information, they learn that you do not need an antibiotic at all. They may find out that you don't have an infection, or that the antibiotic you're taking won't work against your infection. For example, an infection caused by a virus can't be treated with antibiotics. Staying on an antibiotic when you don't need it is more likely to be harmful than helpful.      You may experience side effects from your antibiotic.    Like all medications, antibiotics have side effects. Some of these can be serious.    Let you healthcare team know if you have any known allergies when you are admitted to the hospital.    One significant side effect of nearly all antibiotics is the risk of severe and sometimes deadly diarrhea caused by Clostridium difficile (C. Difficile). This occurs when a person takes antibiotics because  some good germs are destroyed. Antibiotic use allows C. diificile to take over, putting patients at high risk for this serious infection.    As a patient or caregiver, it is important to understand your or your loved one's antibiotic treatment. It is especially important for caregivers to speak up when patients can't speak for themselves. Here are some important questions to ask your healthcare team.    What infection is this antibiotic treating and how do you know I have that infection?    What side effects might occur from this antibiotic?    How long will I need to take this antibiotic?    Is it safe to take this antibiotic with other medications or supplements (e.g., vitamins) that I am taking?     Are there any special directions I need to know about taking this antibiotic? For example, should I take it with food?    How will I be monitored to know whether my infection is responding to the antibiotic?    What tests may help to make sure the right antibiotic is prescribed for me?      Information provided by:  www.cdc.gov/getsmart  U.S. Department of Health and Human Services  Centers for disease Control and Prevention  National Center for Emerging and Zoonotic Infectious Diseases  Division of Healthcare Quality Promotion             Medication List: This is a list of all your medications and when to take them. Check marks below indicate your daily home schedule. Keep this list as a reference.      Medications           Morning Afternoon Evening Bedtime As Needed    ACCU-CHEK COMPACT PLUS test strip   USE TO TEST BLOOD SUGAR THREE TIMES A DAY OR AS DIRECTED   Generic drug:  blood glucose monitoring                                ACETAMINOPHEN PO   Take 325-650 mg by mouth every 4 hours as needed.                                   aspirin 81 MG tablet   Take 81 mg by mouth daily                                   baclofen 10 MG tablet   Commonly known as:  LIORESAL   Take 1 tablet (10 mg) by mouth 3 times daily as  needed for muscle spasms                                   CATAPRES PO   Take 0.1 mg by mouth 2 times daily                                   ciprofloxacin 500 MG tablet   Commonly known as:  CIPRO   Take 1 tablet (500 mg) by mouth 2 times daily for 10 days                                      Cranberry 400 MG Tabs   Take 400 mg by mouth 2 times daily                                      cyanocobalamin 1000 MCG/ML injection   Commonly known as:  VITAMIN B12   Inject 1 mL (1,000 mcg) into the muscle every 30 days                                famotidine 20 MG tablet   Commonly known as:  PEPCID   Take 1 tablet (20 mg) by mouth 2 times daily                                      gabapentin 300 MG capsule   Commonly known as:  NEURONTIN   Take 600 mg by mouth 2 times daily   Last time this was given:  600 mg on 7/8/2018  8:39 AM                                      hydrocortisone 1%/eucerin 1% compounded cream   Apply topically 2 times daily                                   labetalol 300 MG tablet   Commonly known as:  NORMODYNE   Take 0.5 tablets (150 mg) by mouth 2 times daily Take 1/2 tablet (150mg) =300mg. Hold for systolic BP less than 120.   Last time this was given:  150 mg on 7/8/2018  8:39 AM                                      lactobacillus rhamnosus (GG) capsule   Take 1 capsule by mouth 2 times daily                                      LANTUS SOLOSTAR 100 UNIT/ML injection   Inject 22 Units Subcutaneous At Bedtime   Last time this was given:  11 Units on 7/7/2018 10:46 PM   Generic drug:  insulin glargine                                   levETIRAcetam 750 MG tablet   Commonly known as:  KEPPRA   Take 1 tablet (750 mg) by mouth 2 times daily   Last time this was given:  750 mg on 7/8/2018  8:39 AM                                      levothyroxine 50 MCG tablet   Commonly known as:  SYNTHROID/LEVOTHROID   TAKE ONE TABLET BY MOUTH EVERY DAY                                   lidocaine 2 % topical gel    Commonly known as:  XYLOCAINE   Apply topically as needed for moderate pain 10 ML every 3 weeks                                   loratadine 10 MG tablet   Commonly known as:  CLARITIN   Take 1 tablet (10 mg) by mouth daily                                   NovoLOG FLEXPEN 100 UNIT/ML injection   Inject Subcutaneous 3 times daily (with meals) Sliding scale   Generic drug:  insulin aspart                                         ondansetron 4 MG ODT tab   Commonly known as:  ZOFRAN ODT   Take 1 tablet (4 mg) by mouth daily as needed for nausea 30 minutes before getting up in your wheelchair                                   pravastatin 10 MG tablet   Commonly known as:  PRAVACHOL   Take 1 tablet (10 mg) by mouth daily                                   predniSONE 5 MG tablet   Commonly known as:  DELTASONE   Take 1 tablet (5 mg) by mouth daily   Last time this was given:  5 mg on 7/8/2018  8:39 AM                                   simethicone 125 MG Chew chewable tablet   Commonly known as:  MYLICON   Take 1 tablet (125 mg) by mouth as needed for intestinal gas                                   TACROLIMUS PO   Take 2.5 mg by mouth 2 times daily                                      VITAMIN D (CHOLECALCIFEROL) PO   Take 4,000 Units by mouth 2 times daily

## 2018-07-05 NOTE — ED PROVIDER NOTES
History     Chief Complaint:  Altered mental status    HPI   Gem Jade is a 72 year old female who is s/p Kidney transplant on prednisone and Tacrolimus, DM, and frequent UTIs who presents with altered mental status. Her last known well time, reported by her spouse, was 2100 last night. He was unable to wake her this morning, she did not eat or take any of her daily medications. She is normally up early, active, and chatty. She was still in bed around 1300, paramedics were called. EMS reported that she had occasional spontaneous movements, but has not opened her eyes nor verbalized anything for them. She is responsive to pain. Her glu  was found to be 221. She was warm to touch. Her spouse reported that she usually sleeps longer, and is more difficult to arouse right before she gets a UTI. She has an indwelling catheter, normally ambulates on her own, and has a home health care nurse that checks on her monthly.     Allergies:  Bactrim Ds  Atorvastatin calcium  Codeine  Darvocet  Hydromorphone  Levofloxacin  Morphine  Niaspan  Percocet  Vicodin     Medications:    Aspirin  Lioresal  Clonidine  Cranberry  Pepcid  Gabapentin  Novolog  Normodyne  Culturell  Keppra  Levothyroxine  Ondansetron  Prednisone  Mylicon    Past Medical History:    Background diabetic retinopathy  Diabetic gastroparesis  Hyponatremia  Clostridium difficile  Encephalopathy  Kidney transplant  Mixed hyperlipidemia  Obesity  Osteopenia  Pulmonary embolism and infarction  Polyneuropathy in diabetes  Primary localized osteoarthrosis, lower leg  Pyelonephritis  Type 2 diabetes mellitus with complications  Urinary tract infection  UTI due to urinary indwelling catheter    Past Surgical History:    Kidney transplant  MRI head, lumbar spine, pelvis  Knee replacement, total right, post op PE  Cholecystectomy  Cardiac cath, left heart, negative  Normal coronary angiogram at [facility], had mild troponin rise (0.71)  Bilateral cataract repaired, left  eye    Family History:    Diabetes  Parkinsonism  Hypertension  Heart disease    Social History:  Relationship status:   Tobacco use: Former, quit date 1987.  Alcohol use: No  The patient presents via EMS.   Marital Status:   [2]    Review of Systems   Unable to perform ROS: Patient unresponsive   Constitutional: Positive for activity change.   Allergic/Immunologic: Positive for immunocompromised state.     Physical Exam   Vitals:  Patient Vitals for the past 24 hrs:   BP Temp Temp src Pulse Heart Rate Resp SpO2   07/05/18 1645 - - - - 64 11 100 %   07/05/18 1630 162/70 - - - 66 13 98 %   07/05/18 1620 - - - - 64 11 98 %   07/05/18 1550 182/70 - - - 66 15 100 %   07/05/18 1540 179/81 - - - 65 8 100 %   07/05/18 1538 179/78 - - - 65 14 100 %   07/05/18 1535 118/66 97.3  F (36.3  C) Rectal 66 66 14 90 %   07/05/18 1530 200/90 - - - 66 10 94 %   07/05/18 1500 193/87 - - - 63 8 99 %       Physical Exam  Appearance: appears stated age, lying on the cot and will yell out 2/2 painful stimuli, raises her eyebrows to her name but doesn't open her eyes, doesn't follow commands  Eyes: EOMI, PERRL, no scleral injection, no icterus  ENT: dry oral mucosa   Cardiovascular: RRR, nl S1S2, no m/r/g, 2+ pulses in all 4 extremities, cap refill <2sec  Respiratory: good air movement throughout, no wheezes/rhonchi/rales, no increased WOB, no retractions  GI: abd soft, non-distended, no obvious, no HSM, no rebound, no guarding, nl BS  : nl external genitalia, indwelling rico  MSK: SANCHEZ though not following directions, good tone, no bony abnormality, no joint abnormality  Skin: warm and well-perfused, no rash, no edema, no ecchymosis, nl turgor  Neuro: GCS 9 (E1, V3, M4)  Psych: deferred  Heme: no petechia, no purpura, no active bleeding  Lymph: no cervical LAD    Emergency Department Course     ECG:  ECG taken at 1627, ECG read at 1632  Normal sinus rhythm.   Anterior infarct, age undetermined.  No significant change  compared to EKG dated 5/29/18.  Abnormal ECG.  Rate 65 bpm. DE interval 190. QRS duration 80. QT/QTc 420/436. P-R-T axes 30,  3,  60.    Imaging:  Radiology findings were communicated with the patient who voiced understanding of the findings.  Head CT w/o Contrast  IMPRESSION:  Diffuse cerebral volume loss and cerebral white matter  changes consistent with chronic small vessel ischemic disease. No  evidence for acute intracranial pathology.   Reading per radiology.    XR Chest Port 1 View  IMPRESSION:  The cardiac silhouette is stable. The lungs are clear.   Reading per radiology.    Laboratory:  Laboratory findings were communicated with the patient who voiced understanding of the findings.  UA: urine Glu: 100(A), Urine blood: large(A), Protein albumin: >600(A), Nitrate: positive(A), Leukocyte esterase: large(A), WBC/HPF: >182(H), RBC/HPF: 182(H), WBC clumps: present(A), Yeast: many(A) o/w WNL  1515 Lactic Acid: 1.4  CBC: PLT: 129(L) o/w WNL (WBC 7.1, HGB 12.4)  CMP: Glucose: 224(H), BUN: 60(H), Creatinine: 1.52(H), GFR: 34(L) o/w WNL  Procalcitonin: 0.07  Tacrolimus level: pending    Interventions:  1500 Normal Saline 1000 mL IV  Medications   sodium chloride 0.9% infusion (not administered)   cefTRIAXone (ROCEPHIN) 2 g vial to attach to  ml bag for ADULTS or NS 50 ml bag for PEDS (not administered)   0.9% sodium chloride BOLUS (1,000 mLs Intravenous New Bag 7/5/18 1500)       Emergency Department Course:  Nursing notes and vitals reviewed.  I performed an exam of the patient as documented above.   IV was inserted and blood was drawn for laboratory testing, results above.  The patient was sent for a CT, and XR while in the emergency department, results above.   EKG obtained in the ED, see results above.     Patient arrived to the emergency department at [id number] Hospitalist paged  [phone] I spoke with Dr. Guerrero regarding the patient    I discussed the treatment plan with the patient. They expressed  understanding of this plan and consented to admission. I discussed the patient with Dr. Guerrero, who will admit the patient to a monitored bed for further evaluation and treatment.    I personally reviewed the laboratory results with the Patient and answered all related questions prior to admission.    Impression & Plan      Medical Decision Making:  This patient is a 72-year-old female on immunosuppressants status post renal transplant, diabetes, hypertension, frequent UTIs who presents with altered mental status.  She was just admitted at the end of May for a similar presentation.  She was diagnosed with metabolic encephalopathy secondary to sepsis from a UTI.  It took her met multiple days on antibiotics to return to baseline.  During that time there was concern concern for possible subclinical seizure episode, given her prolonged encephalopathic course, therefore an EEG was done.  This did not show any epileptiform activity.  Presentation today is similar.  She was last seen normal at 9:00 last night and today has been non-arousable per the .  She does react to pain, raises her eyebrows to voice, moves all of her extremities however does not follow commands.  Head CT is benign.  Her Johnson catheter was replaced and urine drawn which is clearly abnormal, consistent with UTI.  Based on her last urine culture, which is pansensitive, she is being started on cefepime and as she was admitted within the past 3 months vancomycin is being added.  Otherwise labs are unremarkable.  She not showing significant electrolyte abnormalities.There is no none of the medications are reviewed and otherwise she had a potassium 7 EKG and troponin are unremarkable.  Chest x-ray does not show any obvious infiltrate.  She will be coming into the hospitalist for continued management.      Diagnosis:    ICD-10-CM    1. Encephalopathy acute G93.40 Troponin I   2. Urinary tract infection associated with indwelling urethral catheter,  subsequent encounter T83.511D     N39.0        Disposition:   Admission    CMS Diagnoses: None       Scribe Disclosure:  I, Leticia Morganmichael, am serving as a scribe at 2:02 PM on 7/5/2018 to document services personally performed by Eunice Prieto MD, based on my observations and the provider's statements to me.     EMERGENCY DEPARTMENT       Eunice Prieto MD  07/05/18 3650

## 2018-07-05 NOTE — PROGRESS NOTES
RECEIVING UNIT ED HANDOFF REVIEW    ED Nurse Handoff Report was reviewed by: Lore Amado on July 5, 2018 at 5:59 PM

## 2018-07-05 NOTE — ED NOTES
Patient moving bilat. Arms in bed purposefully.  Does not open eyes but nods or shakes head to writer's questions.

## 2018-07-05 NOTE — PHARMACY-ADMISSION MEDICATION HISTORY
Admission Medication History    Admission medication history interview status for the 7/5/2018 admission is complete. See EPIC admission navigator for prior to admission medications     Medication history source reliability:Good    Actions taken by pharmacist (provider contacted, etc):None     Additional medication history information not noted on PTA med list :None    Medication reconciliation/reorder completed by provider prior to medication history? No    Time spent in this activity: 15 minutes    Prior to Admission medications    Medication Sig Last Dose Taking? Auth Provider   ACETAMINOPHEN PO Take 325-650 mg by mouth every 4 hours as needed.  at PRN Yes Unknown, Entered By History   aspirin 81 MG tablet Take 81 mg by mouth daily 7/4/2018 at 1200 Yes Unknown, Entered By History   baclofen (LIORESAL) 10 MG tablet Take 1 tablet (10 mg) by mouth 3 times daily as needed for muscle spasms  at PRN Yes Marivel Barcenas APRN CNP   CloNIDine HCl (CATAPRES PO) Take 0.1 mg by mouth 2 times daily 7/5/2018 at 0000 Yes Unknown, Entered By History   Cranberry 400 MG TABS Take 400 mg by mouth 2 times daily 7/5/2018 at 0000 Yes Unknown, Entered By History   cyanocobalamin (VITAMIN B12) 1000 MCG/ML injection Inject 1 mL (1,000 mcg) into the muscle every 30 days Past Month at Unknown time Yes Marivel Barcenas APRN CNP   famotidine (PEPCID) 20 MG tablet Take 1 tablet (20 mg) by mouth 2 times daily 7/5/2018 at 0000 Yes Marivel Barcenas APRN CNP   gabapentin (NEURONTIN) 300 MG capsule Take 600 mg by mouth 2 times daily  7/5/2018 at 0000 Yes Sammi Lai MD   hydrocortisone 1%/eucerin 1% compounded cream Apply topically 2 times daily  Patient taking differently: Apply topically 2 times daily as needed   at PRN Yes Marivel Barcenas APRN CNP   insulin aspart (NOVOLOG FLEXPEN) 100 UNIT/ML injection Inject Subcutaneous 3 times daily (with meals) Sliding scale 7/4/2018 at Unknown time Yes     insulin glargine (LANTUS SOLOSTAR) 100 UNIT/ML injection Inject 22 Units Subcutaneous At Bedtime  7/5/2018 at 0000 Yes    labetalol (NORMODYNE) 300 MG tablet Take 0.5 tablets (150 mg) by mouth 2 times daily Take 1/2 tablet (150mg) =300mg. Hold for systolic BP less than 120. 7/5/2018 at 0000 Yes Kelsie Perez APRN CNP   Lactobacillus Rhamnosus, GG, (CULTURELL) capsule Take 1 capsule by mouth 2 times daily 7/5/2018 at 0000 Yes She Siddiqui MD   levETIRAcetam (KEPPRA) 750 MG tablet Take 1 tablet (750 mg) by mouth 2 times daily 7/5/2018 at 0000 Yes Sharri Lujan MD   levothyroxine (SYNTHROID/LEVOTHROID) 50 MCG tablet TAKE ONE TABLET BY MOUTH EVERY DAY 7/4/2018 at 1200 Yes Marivel Barcenas APRN CNP   lidocaine (XYLOCAINE) 2 % topical gel Apply topically as needed for moderate pain 10 ML every 3 weeks  at PRN Yes Marivel Barcenas APRN CNP   loratadine (CLARITIN) 10 MG tablet Take 1 tablet (10 mg) by mouth daily 7/4/2018 at 1200 Yes Marivel Barcenas APRN CNP   ondansetron (ZOFRAN ODT) 4 MG ODT tab Take 1 tablet (4 mg) by mouth daily as needed for nausea 30 minutes before getting up in your wheelchair  at PRN Yes Marivel Barcenas APRN CNP   pravastatin (PRAVACHOL) 10 MG tablet Take 1 tablet (10 mg) by mouth daily 7/5/2018 at 0000 Yes Marivel Barcenas APRN CNP   predniSONE (DELTASONE) 5 MG tablet Take 1 tablet (5 mg) by mouth daily 7/4/2018 at 1200 Yes Marivel Barcenas APRN CNP   simethicone (MYLICON) 125 MG CHEW Take 1 tablet (125 mg) by mouth as needed for intestinal gas  at PRN Yes    TACROLIMUS PO Take 2.5 mg by mouth 2 times daily 7/5/2018 at 0000 Yes Unknown, Entered By History   VITAMIN D, CHOLECALCIFEROL, PO Take 4,000 Units by mouth 2 times daily 7/5/2018 at 0000 Yes Unknown, Entered By History   ACCU-CHEK COMPACT PLUS test strip USE TO TEST BLOOD SUGAR THREE TIMES A DAY OR AS DIRECTED   Marivel Barcenas, APRN CNP        Chang Em, PharmD

## 2018-07-05 NOTE — IP AVS SNAPSHOT
[facility] [address] [facility]    [address] [place] [address]    Phone:  [phone]                                       After Visit Summary   7/5/2018    Gme Jade    MRN: [id number]           After Visit Summary Signature Page     I have received my discharge instructions, and my questions have been answered. I have discussed any challenges I see with this plan with the nurse or doctor.    ..........................................................................................................................................  Patient/Patient Representative Signature      ..........................................................................................................................................  Patient Representative Print Name and Relationship to Patient    ..................................................               ................................................  Date                                            Time    ..........................................................................................................................................  Reviewed by Signature/Title    ...................................................              ..............................................  Date                                                            Time

## 2018-07-05 NOTE — LETTER
Transition Communication Hand-off for Care Transitions to Next Level of Care Provider    Name: Gem Jade  : 1945  MRN #: [id number]  Primary Care Provider: Marivel Barcenas NP     Primary Clinic: [address] [facility] [[email or web address]] [address]  [facility] [address]     Reason for Hospitalization:  Encephalopathy acute [G93.40]  Urinary tract infection associated with indwelling urethral catheter, subsequent encounter [T83.511D, N39.0]  Admit Date/Time: 2018  2:04 PM  Discharge Date: 2018  Payor Source: Payor: MEDICA / Plan: MEDICA PRIME SOLUTION / Product Type: Indemnity /     Readmission Assessment Measure (CHETAN) Risk Score/category: High            Reason for Communication Hand-off Referral: Fragility    Discharge Plan:       Concern for non-adherence with plan of care:   Y/N yes  Discharge Needs Assessment:      Follow-up specialty is recommended: No    Follow-up plan:  No future appointments.    Any outstanding tests or procedures:              Key Recommendations:  Patient was admitted to [facility] with a UTI. ID following and recommended oral Cipro for discharge home. Patient going home with spouse and Resumption of home care    Milena Gtz    AVS/Discharge Summary is the source of truth; this is a helpful guide for improved communication of patient story

## 2018-07-05 NOTE — ED NOTES
[facility]  ED Nurse Handoff Report    ED Chief complaint: Altered Mental Status (pt last seen normaL last night 9 pm at bedtime. pt  sts pt did not wake up today and has had decreased loc.)      ED Diagnosis:   Final diagnoses:   None       Code Status: Full Code    Allergies:   Allergies   Allergen Reactions     Bactrim Ds [Sulfamethoxazole W/Trimethoprim]      Creatinine level increased     Atorvastatin Calcium      myalgias, muscle aches     Codeine Nausea and Vomiting     Darvocet [Propoxyphene N-Apap] Nausea and Vomiting     Hydromorphone      Levofloxacin Other (See Comments) and Diarrhea     hallucinations     Morphine      Nausea and vomiting     Niaspan [Niacin] GI Disturbance     Flushing even at low dose, GI upset     Percocet [Oxycodone-Acetaminophen]      Vomiting after taking med couple of times     Vicodin [Hydrocodone-Acetaminophen] Nausea and Vomiting       Activity level - Baseline/Home:  Stand with Assist    Activity Level - Current:   Unable to Assess     Needed?: No    Isolation: Yes  Infection: Not Applicable  VRE, ESBL  Bariatric?: No    Vital Signs:   Vitals:    07/05/18 1550 07/05/18 1620 07/05/18 1630 07/05/18 1645   BP: 182/70  162/70    Pulse:       Resp: 15 11 13 11   Temp:       TempSrc:       SpO2: 100% 98% 98% 100%       Cardiac Rhythm: ,        Pain level: 0-10 Pain Scale: 0    Is this patient confused?: Yes   [place] - Suicide Severity Rating Scale Completed?  No, secondary to patient nonverbal   If yes, what color did the patient score?  N/A    Patient Report: Initial Complaint: AMS  Focused Assessment: Pt presents to ED obtunded, responding only to painful stimuli.  Patient lives at home with spouse and per spouse, it's not unusual for patient to sleep for 24 hours.  Spouse reports tried to wake patient up and give her medications around 1PM but patient was not responding.  Spouse states she's normally up early and chatty.   Spouse states in  the past, she's had urosepsis and gets difficult to arouse when she has a UTI.  Patient has catheter and was replaced in ED.  Urine very cloudy in appearance and +for UTI.  Lactic acid normal.  Patient hypertensive in 160s-180s, HR 60s, afebrile.  RA sats %, ETCO2 36, RR 11.  After 1L NS fluid, patient moving upper extremities and was nodding to RN questions.  Pt able to lift head off of bed, but not opening eyes.  CT head negative.  Antibiotics administered in ED.  Spouse at home - can call with any questions.  Of note, patient has hx of kidney transplant and additional complex medical history.   Tests Performed: blood work/CT head/EKG/UA/BC x2  Abnormal Results: urine, glucose 224, creatinine 1.52  Treatments provided: 1L NS bolus, antibiotics    Family Comments: none here    OBS brochure/video discussed/provided to patient: N/A    ED Medications:   Medications   0.9% sodium chloride BOLUS (1,000 mLs Intravenous New Bag 7/5/18 1500)       Drips infusing?:  Yes    For the majority of the shift this patient was Green.   Interventions performed were comfort measures.    Severe Sepsis OR Septic Shock Diagnosis Present: No      ED NURSE PHONE NUMBER: *[phone]

## 2018-07-06 ENCOUNTER — TELEPHONE (OUTPATIENT)
Dept: FAMILY MEDICINE | Facility: CLINIC | Age: 73
End: 2018-07-06

## 2018-07-06 LAB
ANION GAP SERPL CALCULATED.3IONS-SCNC: 9 MMOL/L (ref 3–14)
BUN SERPL-MCNC: 44 MG/DL (ref 7–30)
CALCIUM SERPL-MCNC: 8.5 MG/DL (ref 8.5–10.1)
CHLORIDE SERPL-SCNC: 115 MMOL/L (ref 94–109)
CO2 SERPL-SCNC: 20 MMOL/L (ref 20–32)
CREAT SERPL-MCNC: 1.24 MG/DL (ref 0.52–1.04)
GFR SERPL CREATININE-BSD FRML MDRD: 43 ML/MIN/1.7M2
GLUCOSE BLDC GLUCOMTR-MCNC: 108 MG/DL (ref 70–99)
GLUCOSE BLDC GLUCOMTR-MCNC: 115 MG/DL (ref 70–99)
GLUCOSE BLDC GLUCOMTR-MCNC: 75 MG/DL (ref 70–99)
GLUCOSE BLDC GLUCOMTR-MCNC: 84 MG/DL (ref 70–99)
GLUCOSE BLDC GLUCOMTR-MCNC: 94 MG/DL (ref 70–99)
GLUCOSE BLDC GLUCOMTR-MCNC: 96 MG/DL (ref 70–99)
GLUCOSE SERPL-MCNC: 84 MG/DL (ref 70–99)
POTASSIUM SERPL-SCNC: 4.4 MMOL/L (ref 3.4–5.3)
SODIUM SERPL-SCNC: 144 MMOL/L (ref 133–144)
TACROLIMUS BLD-MCNC: 6.2 UG/L (ref 5–15)
TME LAST DOSE: NORMAL H

## 2018-07-06 PROCEDURE — 25000128 H RX IP 250 OP 636: Performed by: EMERGENCY MEDICINE

## 2018-07-06 PROCEDURE — 40000893 ZZH STATISTIC PT IP EVAL DEFER

## 2018-07-06 PROCEDURE — 25800025 ZZH RX 258: Performed by: PHYSICIAN ASSISTANT

## 2018-07-06 PROCEDURE — 25000131 ZZH RX MED GY IP 250 OP 636 PS 637: Mod: GY | Performed by: PHYSICIAN ASSISTANT

## 2018-07-06 PROCEDURE — 80048 BASIC METABOLIC PNL TOTAL CA: CPT | Performed by: PHYSICIAN ASSISTANT

## 2018-07-06 PROCEDURE — 00000146 ZZHCL STATISTIC GLUCOSE BY METER IP

## 2018-07-06 PROCEDURE — 25000128 H RX IP 250 OP 636: Performed by: INTERNAL MEDICINE

## 2018-07-06 PROCEDURE — 12000000 ZZH R&B MED SURG/OB

## 2018-07-06 PROCEDURE — 99232 SBSQ HOSP IP/OBS MODERATE 35: CPT | Performed by: HOSPITALIST

## 2018-07-06 PROCEDURE — 25000128 H RX IP 250 OP 636: Performed by: PHYSICIAN ASSISTANT

## 2018-07-06 PROCEDURE — 36415 COLL VENOUS BLD VENIPUNCTURE: CPT | Performed by: PHYSICIAN ASSISTANT

## 2018-07-06 RX ORDER — SODIUM CHLORIDE, SODIUM LACTATE, POTASSIUM CHLORIDE, CALCIUM CHLORIDE 600; 310; 30; 20 MG/100ML; MG/100ML; MG/100ML; MG/100ML
INJECTION, SOLUTION INTRAVENOUS CONTINUOUS
Status: DISCONTINUED | OUTPATIENT
Start: 2018-07-06 | End: 2018-07-07

## 2018-07-06 RX ORDER — HEPARIN SODIUM 5000 [USP'U]/.5ML
5000 INJECTION, SOLUTION INTRAVENOUS; SUBCUTANEOUS EVERY 12 HOURS
Status: DISCONTINUED | OUTPATIENT
Start: 2018-07-06 | End: 2018-07-06

## 2018-07-06 RX ORDER — METHYLPREDNISOLONE SODIUM SUCCINATE 40 MG/ML
10 INJECTION, POWDER, LYOPHILIZED, FOR SOLUTION INTRAMUSCULAR; INTRAVENOUS EVERY 24 HOURS
Status: DISCONTINUED | OUTPATIENT
Start: 2018-07-06 | End: 2018-07-07

## 2018-07-06 RX ADMIN — TACROLIMUS 0.07 MG/HR: 5 INJECTION, SOLUTION INTRAVENOUS at 19:22

## 2018-07-06 RX ADMIN — LABETALOL HYDROCHLORIDE 10 MG: 5 INJECTION, SOLUTION INTRAVENOUS at 01:42

## 2018-07-06 RX ADMIN — DEXTROSE MONOHYDRATE 25 ML: 500 INJECTION PARENTERAL at 23:46

## 2018-07-06 RX ADMIN — LEVETIRACETAM 750 MG: 100 INJECTION, SOLUTION INTRAVENOUS at 21:47

## 2018-07-06 RX ADMIN — CEFEPIME HYDROCHLORIDE 1 G: 1 INJECTION, POWDER, FOR SOLUTION INTRAMUSCULAR; INTRAVENOUS at 22:12

## 2018-07-06 RX ADMIN — SODIUM CHLORIDE, POTASSIUM CHLORIDE, SODIUM LACTATE AND CALCIUM CHLORIDE: 600; 310; 30; 20 INJECTION, SOLUTION INTRAVENOUS at 16:36

## 2018-07-06 RX ADMIN — LABETALOL HYDROCHLORIDE 10 MG: 5 INJECTION, SOLUTION INTRAVENOUS at 19:58

## 2018-07-06 RX ADMIN — LABETALOL HYDROCHLORIDE 10 MG: 5 INJECTION, SOLUTION INTRAVENOUS at 14:11

## 2018-07-06 RX ADMIN — MICONAZOLE NITRATE: 2 POWDER TOPICAL at 20:00

## 2018-07-06 RX ADMIN — CEFEPIME HYDROCHLORIDE 1 G: 1 INJECTION, POWDER, FOR SOLUTION INTRAMUSCULAR; INTRAVENOUS at 08:46

## 2018-07-06 RX ADMIN — ENOXAPARIN SODIUM 40 MG: 40 INJECTION SUBCUTANEOUS at 19:58

## 2018-07-06 RX ADMIN — HYDROCORTISONE SODIUM SUCCINATE 50 MG: 100 INJECTION, POWDER, FOR SOLUTION INTRAMUSCULAR; INTRAVENOUS at 08:48

## 2018-07-06 RX ADMIN — LEVETIRACETAM 750 MG: 100 INJECTION, SOLUTION INTRAVENOUS at 08:55

## 2018-07-06 RX ADMIN — SODIUM CHLORIDE: 9 INJECTION, SOLUTION INTRAVENOUS at 06:14

## 2018-07-06 RX ADMIN — SODIUM CHLORIDE: 9 INJECTION, SOLUTION INTRAVENOUS at 14:49

## 2018-07-06 RX ADMIN — INSULIN GLARGINE 11 UNITS: 100 INJECTION, SOLUTION SUBCUTANEOUS at 21:49

## 2018-07-06 RX ADMIN — METHYLPREDNISOLONE SODIUM SUCCINATE 10 MG: 40 INJECTION, POWDER, FOR SOLUTION INTRAMUSCULAR; INTRAVENOUS at 16:36

## 2018-07-06 RX ADMIN — LABETALOL HYDROCHLORIDE 10 MG: 5 INJECTION, SOLUTION INTRAVENOUS at 08:41

## 2018-07-06 RX ADMIN — MICONAZOLE NITRATE: 2 POWDER TOPICAL at 09:02

## 2018-07-06 NOTE — PROGRESS NOTES
Admission    Patient arrives to room 635-1 via cart from ED.  Care plan note: Patient is non-verbal. Responds minimally with head nodding. VSS ex, BP was elevated. O2 Sat 100% on 2L. Johnson in place, with cloudy, yellow, sediment. No non-verbal indicators of pain.     Inpatient nursing criteria listed below were met:    PCD's Documented: No  Skin issues/needs documented :Yes  Isolation education started/completed No. Patient is lethargic.  Patient allergies verified with patient: No. Patient non-verbal.  Verified completion of [place] Risk Assessment Tool:  No. Patient non-verbal.  Verified completion of Guardianship screening tool: No. Patient non-verbal.  Fall Prevention: Care plan updated, Education given and documented Yes  Care Plan initiated: Yes  Home medications documented in belongings flowsheet: NA  Patient belongings documented in belongings flowsheet: NA  Reminder note (belongings/ medications) placed in discharge instructions:NA  Admission profile/ required documentation complete: No. Patient non-verbal.

## 2018-07-06 NOTE — PROGRESS NOTES
[facility]  Patient is currently open to home care services with [facility]. The patient is currently receiving Skilled Nursing and HHA services. [facility]  and team have been notified of patient admission. [facility] liaison will continue to follow patient during stay. If appropriate provide orders to resume home care at time of discharge.

## 2018-07-06 NOTE — TELEPHONE ENCOUNTER
Nhi from Unit [address] @ [facility] (where the pt is inpatient currently) called to request information regarding pt's catheter. The unit would like to change the catheter to the pt's normal type and size however can not find that information.     [address] tel: [phone] (request to talk to RN)    Delma Flores

## 2018-07-06 NOTE — H&P
Hx and PE dictated    Principal Problem:    Altered Mental Status, Probable Metabolic encephalopathy  Active Problems:    Diabetic gastroparesis (H)    Recurrent UTI    Benign essential hypertension    Diabetic polyneuropathy associated with type 2 diabetes mellitus (H)    Paroxysmal a-fib (H)    Obstructive sleep apnea syndrome    Chronic indwelling Rico catheter    Seizure disorder (H)    Dermatitis     Plan -- IV fluids, IV Cefepime, await UC and blood cultures.  See other orders.   Full code per .    Has had rico for > 10 years,  not aware of urology seeing her.    Will consult nephrology concerning Tacrolimus dosing (level pending).   Discussed with  -- ?any attempts to get rid of rico -- he is not aware of it, and he is not aware of her seeing urology.  If unable to get rid of rico one could consider Hiprex to help prevent recurrent UTI's.        Gregg Veronica   Pager: [phone]  Cell Phone:  [phone]

## 2018-07-06 NOTE — PLAN OF CARE
Problem: Patient Care Overview  Goal: Plan of Care/Patient Progress Review  Outcome: No Change  Somnolent, arouses to voice, nonverbal. ROBBY orientation. VSS on CPAP. Tele NSR. NS at 125. Johnson intact and patent. BG was 84 and 96. NPO. Excoriation present on buttocks. Turn and repo Q2. No signs of pain. Fall precautions in place. Will cont to monitor.

## 2018-07-06 NOTE — PLAN OF CARE
Problem: Patient Care Overview  Goal: Plan of Care/Patient Progress Review  PT:  Discharge Planner PT   Patient plan for discharge: Return home  Current status: Orders received, chart reviewed, spoke with patient. Pt is wc bound and transfers with a lift at home, has a christiana lift and uses a wc at home for mobility. Family and aide assist with cares. Pt has no skilled inpatient PT or OT needs at this time.  Barriers to return to prior living situation: None  Recommendations for discharge: Return home with assist for cares as prior  Rationale for recommendations: No skilled inpatient PT or OT needs at this time.       Entered by: Izabella Miller 07/06/2018 1:45 PM

## 2018-07-06 NOTE — PROGRESS NOTES
"[facility]  Hospitalist Progress Note        Mark Anthony Rutherford MD  07/06/2018      Interval History:  Somnolent, lying in bed, nursing staff reported no major issues over night.          Assessment and Plan:This sis a 72-y/o woman with PMH significant for  ESRD s/p renal transplant in 1999 on chronic immunosuppression with prednisone and tacrolimus, chronic indwelling Johnson with frequent urinary tract infections including citrobacter, pansensitive E. coli and VRE in the past, history of seizures and history of altered mental status when presenting with a urinary tract infection who presents to [facility] with altered mental status.            Acute encephalopathy   catheter associated urinary tract infection  Sepsis of urinary source    History MDR  - - Continue broad spectrum Abx, now on cefepime   - - Consulted ID for abx management  In setting prior infections with MDR pathogens     Diabetes mellitus.  We will give half dose of Lantus in addition to n.p.o. sliding scale.       Hypertension. Hold off on clonidine at present time.  Labetalol scheduled with hold parameters.    Seizures.  Keppra continued IV.     History of renal transplant.  Discussed with pharmacy; will see if we have tacrolimus available.  Hold prednisone for time being.    Hypothyroidism.  We will hold levothyroxine at present time.     Dermatitis.  Suspect this is candidal dermatitis.  Continue with Miconazole powder at present time, but may need Diflucan the future    DVT prophylaxis;   CODE; FULL   Dispo; Pending clinical improvement                    Physical Exam:      Heart Rate: 81, Blood pressure 142/72, pulse 78, temperature 98.1  F (36.7  C), temperature source Axillary, resp. rate 16, height 1.676 m (5' 6\"), weight 97.6 kg (215 lb 2.7 oz), SpO2 97 %, not currently breastfeeding.  Vitals:    07/05/18 1726 07/06/18 0651   Weight: 145.2 kg (320 lb 1.7 oz) 97.6 kg (215 lb 2.7 oz)     Vital Signs with " Ranges  Temp:  [97.3  F (36.3  C)-98.3  F (36.8  C)] 98.1  F (36.7  C)  Pulse:  [64-81] 78  Heart Rate:  [63-81] 81  Resp:  [7-18] 16  BP: (118-200)/(59-90) 142/72  SpO2:  [90 %-100 %] 97 %  I/O's Last 24 hours  I/O last 3 completed shifts:  In: 1194 [I.V.:1194]  Out: 850 [Urine:850]    Constitutional:  Somnolent, following commands   Lungs: CTA no wheezing   Cardiovascular: RRR no murmurs   Abdomen: Obese, soft, rico in palce   Skin: Warm dry no rashes   Other:           Medications:          ceFEPIme (MAXIPIME) IV  1 g Intravenous Q12H     enoxaparin  40 mg Subcutaneous Q24H     insulin aspart  1-6 Units Subcutaneous Q4H     insulin glargine  11 Units Subcutaneous At Bedtime     labetalol  10 mg Intravenous Q6H     levETIRAcetam  750 mg Intravenous Q12H     miconazole   Topical BID     [START ON 7/7/2018] predniSONE  5 mg Oral Daily     tacrolimus  2.5 mg Oral Q12H HO     PRN Meds: acetaminophen, glucose **OR** dextrose **OR** glucagon, magnesium sulfate, naloxone, ondansetron **OR** ondansetron, prochlorperazine **OR** prochlorperazine **OR** prochlorperazine         Data:      All new lab and imaging data was reviewed.   Recent Labs   Lab Test  07/05/18   1418  07/03/18   1235  06/08/18   1215   11/06/17   1652   04/26/17   2338  04/26/17   0638   WBC  7.1  6.3  5.2   < >  13.5*   < >  5.0  5.1   HGB  12.4  10.9*  9.5*   < >  12.7   < >  9.6*  9.7*   MCV  94  94  94   < >  96   < >  92  92   PLT  129*  150  151   < >  171   < >  105*  91*   INR   --    --    --    --   1.09   --   1.03  1.06    < > = values in this interval not displayed.      Recent Labs   Lab Test  07/06/18   0906  07/05/18   1418  07/03/18   1235   NA  144  140  135   POTASSIUM  4.4  5.2  4.7   CHLORIDE  115*  106  104   CO2  20  27  26   BUN  44*  60*  69*   CR  1.24*  1.52*  1.46*   ANIONGAP  9  7  5   ROGER  8.5  9.3  8.9   GLC  84  224*  229*     Recent Labs   Lab Test  07/05/18   1418  11/13/17   1613  04/19/17   0005   TROPI  <0.015   <0.015  <0.015  The 99th percentile for upper reference range is 0.045 ug/L.  Troponin values in   the range of 0.045 - 0.120 ug/L may be associated with risks of adverse   clinical events.        Mark Anthony Rutherford MD  Internal Medicine, Hospitalist  Pager#; [phone]

## 2018-07-06 NOTE — PROGRESS NOTES
Spouse requests that Johnson be changed to the type used at home normally. Order received from MD to change it. Spoke with  from [facility] ([phone]) where the pt. Usually sees the NP Klaudia to get the information on the size and type of Johnson. I was told that the nurse is currently busy and will call us back with the information.

## 2018-07-06 NOTE — CONSULTS
[facility]    Nephrology Consultation     Date of Admission:  7/5/2018    Assessment & Plan   Gem Jade is a 72 year old female with kidney transplant who was admitted on 7/5/2018.     Gem Jade is a 72 year old female who was admitted on 5/25/2018.      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     2) LDKT:  Baseline cr of late 1.2-1.4.  Prograf level is 6.2.  Target range for her is 3-5.  She is close to this range and now her cr has returned to baseline.  Therefore I will not alter her Prograf dose.       3) Proteinuria:  15 g/g !!  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       4) ROB:  Cr up to 1.52.  Likely related to UTI/bacteremia/sepsis.  Better now.       5) Encephalopathy: Likely related to UTI.  This happens often.    6) Na, Cl up.  Bicarb down.    Suggest:    As unable to take po, change prednisone and Prograf to IV  Change to LR.           Chang Marina MD  [facility] - Nephrology  [phone]    Reason for Consult   I was asked to see the patient for kidney transplant.     Primary Care Physician   Marivel Barcenas NP    Chief Complaint         History is obtained from chart review.      History of Present Illness   Gem Jade is a 72 year old female with kidney transplant who presents with UTI and encephalopathy.    Her  was unable to waken her this AM so he called [phone].    She has been found to have a UTI, which very commonly affects her in this way.  She has a chronic rico.    She has ESRD due to DM that has been treated with Tx.    Past Medical History   I have reviewed this patient's medical history and updated it with pertinent information if needed.   Past Medical History:   Diagnosis Date     Background diabetic retinopathy(362.01)     extensive diabetic retinopathy, s/p multiple laser treatments     Diabetic gastroparesis (H)     followed by [facility] (Dr. Chen)     Diarrhea      Dizziness and  giddiness      Hyponatremia 12/16/11    Na 121     Kidney replaced by transplant      Mixed hyperlipidemia      Obesity      Osteopenia 11/07    per DEXA     Other and unspecified hyperlipidemia      Other pulmonary embolism and infarction 3/03    Bilateral pulmonary emboli post op after knee replacement     Polyneuropathy in diabetes(357.2)      Primary localized osteoarthrosis, lower leg      Pyelonephritis, unspecified      Type 2 diabetes mellitus with complications (H)      Urinary tract infection, site not specified     Chronic UTIs     UTI (urinary tract infection) due to urinary indwelling catheter (H)        Past Surgical History   I have reviewed this patient's surgical history and updated it with pertinent information if needed.  Past Surgical History:   Procedure Laterality Date     C NONSPECIFIC PROCEDURE  10/99    kidney transplant     C NONSPECIFIC PROCEDURE  8/00    MRI head, lumbar spine, pelvis     C TOTAL KNEE ARTHROPLASTY  3/03    Knee Replacement, Total right, post op PE     CHOLECYSTECTOMY       HC LEFT HEART CATHETERIZATION  1999    Cardiac Cath, Left Heart, Negative  (@ [facility])     HC LEFT HEART CATHETERIZATION  4/05    normal coronary angiogram at [facility] [place], had mild troponin rise (0.71)     HC REMV CATARACT EXTRACAP,INSERT LENS  8/04    bilateral cataract repaired, left eye        Prior to Admission Medications   Prior to Admission Medications   Prescriptions Last Dose Informant Patient Reported? Taking?   ACCU-CHEK COMPACT PLUS test strip   No No   Sig: USE TO TEST BLOOD SUGAR THREE TIMES A DAY OR AS DIRECTED   ACETAMINOPHEN PO  at PRN Spouse/Significant Other Yes Yes   Sig: Take 325-650 mg by mouth every 4 hours as needed.   CloNIDine HCl (CATAPRES PO) 7/5/2018 at 0000 Spouse/Significant Other Yes Yes   Sig: Take 0.1 mg by mouth 2 times daily   Cranberry 400 MG TABS 7/5/2018 at 0000 Spouse/Significant Other Yes Yes   Sig: Take 400 mg by mouth 2 times daily   Lactobacillus Rhamnosus, GG,  (CULTURELL) capsule 7/5/2018 at 0000 Spouse/Significant Other No Yes   Sig: Take 1 capsule by mouth 2 times daily   TACROLIMUS PO 7/5/2018 at 0000 Spouse/Significant Other Yes Yes   Sig: Take 2.5 mg by mouth 2 times daily   VITAMIN D, CHOLECALCIFEROL, PO 7/5/2018 at 0000 Spouse/Significant Other Yes Yes   Sig: Take 4,000 Units by mouth 2 times daily   aspirin 81 MG tablet 7/4/2018 at 1200 Spouse/Significant Other Yes Yes   Sig: Take 81 mg by mouth daily   baclofen (LIORESAL) 10 MG tablet  at PRN Spouse/Significant Other Yes Yes   Sig: Take 1 tablet (10 mg) by mouth 3 times daily as needed for muscle spasms   cyanocobalamin (VITAMIN B12) 1000 MCG/ML injection Past Month at Unknown time Spouse/Significant Other No Yes   Sig: Inject 1 mL (1,000 mcg) into the muscle every 30 days   famotidine (PEPCID) 20 MG tablet 7/5/2018 at 0000 Spouse/Significant Other Yes Yes   Sig: Take 1 tablet (20 mg) by mouth 2 times daily   gabapentin (NEURONTIN) 300 MG capsule 7/5/2018 at 0000 Spouse/Significant Other Yes Yes   Sig: Take 600 mg by mouth 2 times daily    hydrocortisone 1%/eucerin 1% compounded cream  at PRN Spouse/Significant Other No Yes   Sig: Apply topically 2 times daily   Patient taking differently: Apply topically 2 times daily as needed    insulin aspart (NOVOLOG FLEXPEN) 100 UNIT/ML injection 7/4/2018 at Unknown time Spouse/Significant Other Yes Yes   Sig: Inject Subcutaneous 3 times daily (with meals) Sliding scale   insulin glargine (LANTUS SOLOSTAR) 100 UNIT/ML injection 7/5/2018 at 0000 Spouse/Significant Other Yes Yes   Sig: Inject 22 Units Subcutaneous At Bedtime    labetalol (NORMODYNE) 300 MG tablet 7/5/2018 at 0000 Spouse/Significant Other No Yes   Sig: Take 0.5 tablets (150 mg) by mouth 2 times daily Take 1/2 tablet (150mg) =300mg. Hold for systolic BP less than 120.   levETIRAcetam (KEPPRA) 750 MG tablet 7/5/2018 at 0000  No Yes   Sig: Take 1 tablet (750 mg) by mouth 2 times daily   levothyroxine  (SYNTHROID/LEVOTHROID) 50 MCG tablet 7/4/2018 at 1200 Spouse/Significant Other No Yes   Sig: TAKE ONE TABLET BY MOUTH EVERY DAY   lidocaine (XYLOCAINE) 2 % topical gel  at PRN Spouse/Significant Other No Yes   Sig: Apply topically as needed for moderate pain 10 ML every 3 weeks   loratadine (CLARITIN) 10 MG tablet 7/4/2018 at 1200 Spouse/Significant Other No Yes   Sig: Take 1 tablet (10 mg) by mouth daily   ondansetron (ZOFRAN ODT) 4 MG ODT tab  at PRN  No Yes   Sig: Take 1 tablet (4 mg) by mouth daily as needed for nausea 30 minutes before getting up in your wheelchair   pravastatin (PRAVACHOL) 10 MG tablet 7/5/2018 at 0000  No Yes   Sig: Take 1 tablet (10 mg) by mouth daily   predniSONE (DELTASONE) 5 MG tablet 7/4/2018 at 1200 Spouse/Significant Other No Yes   Sig: Take 1 tablet (5 mg) by mouth daily   simethicone (MYLICON) 125 MG CHEW  at PRN Spouse/Significant Other Yes Yes   Sig: Take 1 tablet (125 mg) by mouth as needed for intestinal gas      Facility-Administered Medications: None     Allergies   Allergies   Allergen Reactions     Bactrim Ds [Sulfamethoxazole W/Trimethoprim]      Creatinine level increased     Atorvastatin Calcium      myalgias, muscle aches     Codeine Nausea and Vomiting     Darvocet [Propoxyphene N-Apap] Nausea and Vomiting     Hydromorphone      Levofloxacin Other (See Comments) and Diarrhea     hallucinations     Morphine      Nausea and vomiting     Niaspan [Niacin] GI Disturbance     Flushing even at low dose, GI upset     Percocet [Oxycodone-Acetaminophen]      Vomiting after taking med couple of times     Vicodin [Hydrocodone-Acetaminophen] Nausea and Vomiting       Social History   I have reviewed this patient's social history and updated it with pertinent information if needed. Gem Jade  reports that she quit smoking about 31 years ago. She quit after 17.00 years of use. She has never used smokeless tobacco. She reports that she does not drink alcohol or use illicit  drugs.    Family History   I have reviewed this patient's family history and updated it with pertinent information if needed.   Family History   Problem Relation Age of Onset     Diabetes Mother      Parkinsonism Mother      Diabetes Brother      Hypertension Father      [id number] pneumonia     HEART DISEASE Father        Review of Systems   The 10 point Review of Systems is negative other than noted in the HPI.      Physical Exam   Temp: 98.1  F (36.7  C) Temp src: Axillary BP: 142/72 Pulse: 78 Heart Rate: 81 Resp: 16 SpO2: 97 % O2 Device: BiPAP/CPAP Oxygen Delivery: 2 LPM  Vital Signs with Ranges  Temp:  [97.3  F (36.3  C)-98.3  F (36.8  C)] 98.1  F (36.7  C)  Pulse:  [64-81] 78  Heart Rate:  [63-81] 81  Resp:  [7-18] 16  BP: (118-200)/(59-90) 142/72  SpO2:  [90 %-100 %] 97 %  215 lbs 2.7 oz    GENERAL: unresponsive lying in bed  HEENT:  Normocephalic. No gross abnormalities.  Pupils equal - 3mm  MMM.  Dentition is fair.  CV: RRR, no murmurs, no clicks, gallops, or rubs, no edema, no carotid bruits  RESP: Clear bilaterally with good efforts  GI: Abdomen soft/nt/nd, BS normal. No masses, organomegaly  MUSCULOSKELETAL: atrophy of hand muscles  SKIN: no suspicious lesions or rashes, dry to touch  NEURO:  unable  PSYCH: unable.    LYMPH: No palpable ant/post cervical and supraclavicular adenopathy    Data   BMP  Recent Labs  Lab 07/06/18  0906 07/05/18  1418 07/03/18  1235    140 135   POTASSIUM 4.4 5.2 4.7   CHLORIDE 115* 106 104   ROGER 8.5 9.3 8.9   CO2 20 27 26   BUN 44* 60* 69*   CR 1.24* 1.52* 1.46*   GLC 84 224* 229*     [id number](phos:4)  CBC)  Recent Labs  Lab 07/05/18  1418 07/03/18  1235   WBC 7.1 6.3   HGB 12.4 10.9*   HCT 38.5 33.9*   MCV 94 94   * 150       Recent Labs  Lab 07/05/18  1418   AST 27   ALT 42   ALKPHOS 91   BILITOTAL 0.3     No lab results found in last 7 days.  25 OH Vit D2   Date Value Ref Range Status   03/10/2017 <5 ug/L Final     25 OH Vit D3   Date Value Ref Range  Status   03/10/2017 43 ug/L Final     25 OH Vit D total   Date Value Ref Range Status   03/10/2017  20 - 75 ug/L Final    <48  Season, race, dietary intake, and treatment affect the concentration of   25-hydroxy-Vitamin D. Values may decrease during winter months and increase   during summer months. Values 20-29 ug/L may indicate Vitamin D insufficiency   and values <20 ug/L may indicate Vitamin D deficiency.   This test was developed and its performance characteristics determined by the   [facility],  [facility]. It has   not been cleared or approved by the FDA. The laboratory is regulated under CLIA   as qualified to perform high-complexity testing. This test is used for clinical   purposes. It should not be regarded as investigational or for research.         Recent Labs  Lab 07/05/18  1418   HGB 12.4   HCT 38.5   MCV 94     No results for input(s): PTHI in the last 168 hours.

## 2018-07-06 NOTE — TELEPHONE ENCOUNTER
Writer called Nhi from [address] @ [facility] (where the pt is inpatient currently) and relayed that patient Cath is a sliver tipped and needs a 30cc balloon, changed monthly. 16f or 18f . Nurse verbalizes understanding.    Thanks! Shayna Licona RN

## 2018-07-06 NOTE — CONSULTS
[facility]    Infectious Disease Consultation     Date of Admission:  7/5/2018  Date of Consult (When I saw the patient): 07/06/18    Assessment & Plan   Gem Jade is a 72 year old female who was admitted on 7/5/2018.     Impression:  1. 72 y.o with ESRD. S/p renal transplant in 1999.   2. Tacrolimus and prednisone use for immunosuppression.   3. Recurrent UTI.   4. Recurrent admission for encephalopathy related or times unrelated to urine infection.   5. UA dirty, citrobacter in urine culture.   6. On cefepime based on prior micro.     Recommendations   Continue on cefepime   Follow up on the SHEELA on the citrobacter.       Paramjit Mckenzie MD    Reason for Consult   Reason for consult: I was asked by Dr. Rutherford  to evaluate this patient for Urosepsis .    Primary Care Physician   Marivel Barcenas NP    Chief Complaint   Altered mental status     History is obtained from the patient and medical records    History of Present Illness   Gem Jade is a 72 year old female with PMH significant for  ESRD s/p renal transplant in 1999 on chronic immunosuppression with prednisone and tacrolimus, chronic indwelling Johnson with frequent urinary tract infections. Who is coming in with encephalopathy.      Past Medical History   I have reviewed this patient's medical history and updated it with pertinent information if needed.   Past Medical History:   Diagnosis Date     Background diabetic retinopathy(362.01)     extensive diabetic retinopathy, s/p multiple laser treatments     Diabetic gastroparesis (H)     followed by [facility] (Dr. Chen)     Diarrhea      Dizziness and giddiness      Hyponatremia 12/16/11    Na 121     Kidney replaced by transplant      Mixed hyperlipidemia      Obesity      Osteopenia 11/07    per DEXA     Other and unspecified hyperlipidemia      Other pulmonary embolism and infarction 3/03    Bilateral pulmonary emboli post op after knee replacement     Polyneuropathy in  diabetes(357.2)      Primary localized osteoarthrosis, lower leg      Pyelonephritis, unspecified      Type 2 diabetes mellitus with complications (H)      Urinary tract infection, site not specified     Chronic UTIs     UTI (urinary tract infection) due to urinary indwelling catheter (H)        Past Surgical History   I have reviewed this patient's surgical history and updated it with pertinent information if needed.  Past Surgical History:   Procedure Laterality Date     C NONSPECIFIC PROCEDURE  10/99    kidney transplant     C NONSPECIFIC PROCEDURE  8/00    MRI head, lumbar spine, pelvis     C TOTAL KNEE ARTHROPLASTY  3/03    Knee Replacement, Total right, post op PE     CHOLECYSTECTOMY       HC LEFT HEART CATHETERIZATION  1999    Cardiac Cath, Left Heart, Negative  (@ [facility])     HC LEFT HEART CATHETERIZATION  4/05    normal coronary angiogram at [facility], had mild troponin rise (0.71)     HC REMV CATARACT EXTRACAP,INSERT LENS  8/04    bilateral cataract repaired, left eye        Prior to Admission Medications   Prior to Admission Medications   Prescriptions Last Dose Informant Patient Reported? Taking?   ACCU-CHEK COMPACT PLUS test strip   No No   Sig: USE TO TEST BLOOD SUGAR THREE TIMES A DAY OR AS DIRECTED   ACETAMINOPHEN PO  at PRN Spouse/Significant Other Yes Yes   Sig: Take 325-650 mg by mouth every 4 hours as needed.   CloNIDine HCl (CATAPRES PO) 7/5/2018 at 0000 Spouse/Significant Other Yes Yes   Sig: Take 0.1 mg by mouth 2 times daily   Cranberry 400 MG TABS 7/5/2018 at 0000 Spouse/Significant Other Yes Yes   Sig: Take 400 mg by mouth 2 times daily   Lactobacillus Rhamnosus, GG, (CULTURELL) capsule 7/5/2018 at 0000 Spouse/Significant Other No Yes   Sig: Take 1 capsule by mouth 2 times daily   TACROLIMUS PO 7/5/2018 at 0000 Spouse/Significant Other Yes Yes   Sig: Take 2.5 mg by mouth 2 times daily   VITAMIN D, CHOLECALCIFEROL, PO 7/5/2018 at 0000 Spouse/Significant Other Yes Yes   Sig: Take 4,000 Units by  mouth 2 times daily   aspirin 81 MG tablet 7/4/2018 at 1200 Spouse/Significant Other Yes Yes   Sig: Take 81 mg by mouth daily   baclofen (LIORESAL) 10 MG tablet  at PRN Spouse/Significant Other Yes Yes   Sig: Take 1 tablet (10 mg) by mouth 3 times daily as needed for muscle spasms   cyanocobalamin (VITAMIN B12) 1000 MCG/ML injection Past Month at Unknown time Spouse/Significant Other No Yes   Sig: Inject 1 mL (1,000 mcg) into the muscle every 30 days   famotidine (PEPCID) 20 MG tablet 7/5/2018 at 0000 Spouse/Significant Other Yes Yes   Sig: Take 1 tablet (20 mg) by mouth 2 times daily   gabapentin (NEURONTIN) 300 MG capsule 7/5/2018 at 0000 Spouse/Significant Other Yes Yes   Sig: Take 600 mg by mouth 2 times daily    hydrocortisone 1%/eucerin 1% compounded cream  at PRN Spouse/Significant Other No Yes   Sig: Apply topically 2 times daily   Patient taking differently: Apply topically 2 times daily as needed    insulin aspart (NOVOLOG FLEXPEN) 100 UNIT/ML injection 7/4/2018 at Unknown time Spouse/Significant Other Yes Yes   Sig: Inject Subcutaneous 3 times daily (with meals) Sliding scale   insulin glargine (LANTUS SOLOSTAR) 100 UNIT/ML injection 7/5/2018 at 0000 Spouse/Significant Other Yes Yes   Sig: Inject 22 Units Subcutaneous At Bedtime    labetalol (NORMODYNE) 300 MG tablet 7/5/2018 at 0000 Spouse/Significant Other No Yes   Sig: Take 0.5 tablets (150 mg) by mouth 2 times daily Take 1/2 tablet (150mg) =300mg. Hold for systolic BP less than 120.   levETIRAcetam (KEPPRA) 750 MG tablet 7/5/2018 at 0000  No Yes   Sig: Take 1 tablet (750 mg) by mouth 2 times daily   levothyroxine (SYNTHROID/LEVOTHROID) 50 MCG tablet 7/4/2018 at 1200 Spouse/Significant Other No Yes   Sig: TAKE ONE TABLET BY MOUTH EVERY DAY   lidocaine (XYLOCAINE) 2 % topical gel  at PRN Spouse/Significant Other No Yes   Sig: Apply topically as needed for moderate pain 10 ML every 3 weeks   loratadine (CLARITIN) 10 MG tablet 7/4/2018 at 1200  Spouse/Significant Other No Yes   Sig: Take 1 tablet (10 mg) by mouth daily   ondansetron (ZOFRAN ODT) 4 MG ODT tab  at PRN  No Yes   Sig: Take 1 tablet (4 mg) by mouth daily as needed for nausea 30 minutes before getting up in your wheelchair   pravastatin (PRAVACHOL) 10 MG tablet 7/5/2018 at 0000  No Yes   Sig: Take 1 tablet (10 mg) by mouth daily   predniSONE (DELTASONE) 5 MG tablet 7/4/2018 at 1200 Spouse/Significant Other No Yes   Sig: Take 1 tablet (5 mg) by mouth daily   simethicone (MYLICON) 125 MG CHEW  at PRN Spouse/Significant Other Yes Yes   Sig: Take 1 tablet (125 mg) by mouth as needed for intestinal gas      Facility-Administered Medications: None     Allergies   Allergies   Allergen Reactions     Bactrim Ds [Sulfamethoxazole W/Trimethoprim]      Creatinine level increased     Atorvastatin Calcium      myalgias, muscle aches     Codeine Nausea and Vomiting     Darvocet [Propoxyphene N-Apap] Nausea and Vomiting     Hydromorphone      Levofloxacin Other (See Comments) and Diarrhea     hallucinations     Morphine      Nausea and vomiting     Niaspan [Niacin] GI Disturbance     Flushing even at low dose, GI upset     Percocet [Oxycodone-Acetaminophen]      Vomiting after taking med couple of times     Vicodin [Hydrocodone-Acetaminophen] Nausea and Vomiting       Immunization History   Immunization History   Administered Date(s) Administered     HepB 01/13/2003     Influenza (H1N1) 11/25/2009     Influenza (High Dose) 3 valent vaccine 12/01/2015, 10/07/2016     Influenza (IIV3) PF 11/30/2000, 11/04/2002, 11/14/2003, 10/22/2004, 11/03/2005, 12/04/2006, 11/12/2007, 10/21/2008, 10/06/2009, 11/09/2010, 11/05/2011     Influenza Vaccine IM 3yrs+ 4 Valent IIV4 10/16/2013     Pneumo Conj 13-V (2010&after) 12/01/2015     Pneumococcal 23 valent 06/17/2013     TDAP Vaccine (Adacel) 06/17/2013     Zoster vaccine, live 06/17/2013       Social History   I have reviewed this patient's social history and updated it with  pertinent information if needed. Gem Jade  reports that she quit smoking about 31 years ago. She quit after 17.00 years of use. She has never used smokeless tobacco. She reports that she does not drink alcohol or use illicit drugs.    Family History   I have reviewed this patient's family history and updated it with pertinent information if needed.   Family History   Problem Relation Age of Onset     Diabetes Mother      Parkinsonism Mother      Diabetes Brother      Hypertension Father      [id number] pneumonia     HEART DISEASE Father        Review of Systems   The 10 point Review of Systems is negative other than noted in the HPI or here.     Physical Exam   Temp: 97.2  F (36.2  C) Temp src: Oral BP: 157/78 Pulse: 78 Heart Rate: 80 Resp: 16 SpO2: 98 % O2 Device: BiPAP/CPAP Oxygen Delivery: 2 LPM  Vital Signs with Ranges  Temp:  [97.2  F (36.2  C)-98.3  F (36.8  C)] 97.2  F (36.2  C)  Pulse:  [64-81] 78  Heart Rate:  [63-81] 80  Resp:  [7-18] 16  BP: (142-186)/(59-88) 157/78  SpO2:  [92 %-100 %] 98 %  215 lbs 2.7 oz  Body mass index is 34.73 kg/(m^2).    GENERAL APPEARANCE:  Sleepy  EYES: Eyes grossly normal to inspection, PERRL and conjunctivae and sclerae normal  HENT: ear canals and TM's normal and nose and mouth without ulcers or lesions  NECK: no adenopathy, no asymmetry, masses, or scars and thyroid normal to palpation  RESP: lungs clear to auscultation - no rales, rhonchi or wheezes  CV: regular rates and rhythm, normal S1 S2, no S3 or S4 and no murmur, click or rub  LYMPHATICS: normal ant/post cervical and supraclavicular nodes  ABDOMEN: soft, nontender, without hepatosplenomegaly or masses and bowel sounds normal  MS: extremities normal- no gross deformities noted  SKIN: no suspicious lesions or rashes      Data   Lab Results   Component Value Date    WBC 7.1 07/05/2018    HGB 12.4 07/05/2018    HCT 38.5 07/05/2018     (L) 07/05/2018     07/06/2018    POTASSIUM 4.4 07/06/2018     CHLORIDE 115 (H) 07/06/2018    CO2 20 07/06/2018    BUN 44 (H) 07/06/2018    CR 1.24 (H) 07/06/2018    GLC 84 07/06/2018    SED 53 (H) 05/25/2016    NTBNPI 660 01/10/2016    TROPONIN <0.04 06/15/2007    TROPI <0.015 07/05/2018    AST 27 07/05/2018    ALT 42 07/05/2018    ALKPHOS 91 07/05/2018    BILITOTAL 0.3 07/05/2018    NOREEN <10 (L) 05/29/2018    INR 1.09 11/06/2017       Recent Labs  Lab 07/05/18  1545 07/05/18  1515 07/05/18  1418   CULT >100,000 colonies/mLCitrobacter freundii complexSusceptibility testing in progress* No growth after 20 hours No growth after 22 hours     Recent Labs   Lab Test  07/05/18   1545  07/05/18   1515  07/05/18   1418  05/30/18   0845  05/27/18   0855  05/25/18   0225  05/25/18   0155  05/25/18   0126  02/09/18   1530   CULT  >100,000 colonies/mL  Citrobacter freundii complex  Susceptibility testing in progress  *  No growth after 20 hours  No growth after 22 hours  No growth  No growth  No growth  >100,000 colonies/mL  Escherichia coli  *  Cultured on the 1st day of incubation:  Escherichia coli  *  Critical Value/Significant Value, preliminary result only, called to and read back by   CONCETTA HILARIO RN [facility] @ 9657 5.25.18 JE    (Note)  POSITIVE for E.COLI by Verigene multiplex nucleic acid test. Final  identification and antimicrobial susceptibility testing will be  verified by standard methods. Verigene test will not distinguish  E.coli from Shigella species including S.dysenteriae, S.flexneri,  S.boydii, and S.sonnei. Specimens containing Shigella species or  E.coli will be reported as Positive for E.coli.    Specimen tested with Verigene multiplex, gram-negative blood culture  nucleic acid test for the following targets: Acinetobacter sp.,  Citrobacter sp., Enterobacter sp., Proteus sp., E. coli, K.  pneumoniae/oxytoca, P. aeruginosa, and the following resistance  markers: CTXM, KPC, NDM, VIM, IMP and OXA.    Critical Value/Significant Value called to and read back by  CONCETTA  SCHLICKSUP RN @1621 5/25/18. CT    >100,000 colonies/mL  Citrobacter freundii complex  *

## 2018-07-06 NOTE — PLAN OF CARE
Problem: Patient Care Overview  Goal: Plan of Care/Patient Progress Review  Outcome: Improving  More alert this evening, nonverbal,  Unable to follow directions.vss, / 78, on scheduled Labetalol,unable to perform neuro checks,total cares, npo, blood sugar 94, npo,Id and  Nephrology following,continues on iv abx,  rico patent, maintained contact isolation for ESBL ,LR infusing at 125 cc/ hr,  Tele NSR,d/c pending progress

## 2018-07-06 NOTE — PLAN OF CARE
Problem: Patient Care Overview  Goal: Plan of Care/Patient Progress Review  Outcome: No Change  Unable to assess pt orientation. Somnolent. Will moan to painful stimuli. No signs of pain. Lung sounds clear. Johnson patent with adequate output. IVF infusing. IV antibiotics started. NPO. D/C pending progress.

## 2018-07-06 NOTE — PLAN OF CARE
Problem: Patient Care Overview  Goal: Plan of Care/Patient Progress Review  Patient remain somnolent throughout the night. Unable to assess orientation and neuros as patient remains nonverbal and does not obey simple commands. Arouse to voice and gentle touch. Nodded in response once. Opens eyes intermittently. VSS. CPAP in place.  and 115. Tele NSR. Infusion in progress at 125 ml/hr. Johnson catheter in place and patent. Rash to left axilla and groin. Buttocks excoriated. Turned and repositioned. Will continue to monitor.

## 2018-07-06 NOTE — H&P
Name:     GEM JADE   MRN:      [id number]        Account:      [id number]   :      1945           PRIMARY CARE PROVIDER:  Dr. Barcenas    DATE OF ADMISSION:     2018      CHIEF COMPLAINT:  Altered mental status.      HISTORY OF PRESENT ILLNESS:  Gem Jade is a 72-year-old lady with a history of diabetic nephropathy status post renal transplant on prednisone and tacrolimus, diabetes mellitus, chronic indwelling Johnson and frequent urinary tract infections who presents to [facility] on 2018 with altered mental status.  The patient was in her usual state yesterday when she went to bed, but unfortunately her  was not able to wake her up this morning.  She has been somnolent and at times able to nod her head yes or no to questions.  Ultimately, EMS was summoned and brought her to [facility].  Glucose 221.  BMP notable for creatinine 1.52, BUN 60 and otherwise normal electrolytes.  Hepatic panel negative.  Lactic acid 1.4.  Procalcitonin 0.07.  Troponin less than 0.015.  CBC is unremarkable including no evidence of a white count or left shift.  UA unfortunately shows gross proteinuria and glucosuria and is nitrite positive, with large blood and large leukocyte esterase, greater than 182 WBC, greater than 182 RBC and WBC clumps present in addition to yeast present in the urine.  Urine culture is pending.  Blood cultures are drawn and are pending.  Chest x-ray is negative.  CT of the head negative for acute intracranial pathology, but does show some diffuse cerebral volume loss and changes consistent with chronic small vessel ischemic disease.      Reviewing the patient's history, she does have a history of frequent UTIs, near monthly.  Most recently, she grew pansensitive E. coli in 2018 (with E. coli bacteremia at that time) and citrobacter in 2018 that was resistant to ampicillin, cefazolin, ceftazidime, ceftriaxone, Zosyn and Bactrim, but was sensitive  to ciprofloxacin, gentamicin, levofloxacin, nitrofurantoin and cefepime.  When the patient does present with her bladder infections, she does typically present with an altered mental status.      The patient was examined on station [id number] where at present time she remains somnolent and is not currently participating in the conversation.  Nursing staff has noted fairly significant dermatitis in her groin and also wrapping up into her lower back.  Otherwise, a comprehensive review of systems could not be undertaken due to mental status changes.      PAST MEDICAL HISTORY:   1.  History of diabetes complicated by retinopathy, polyneuropathy and gastroparesis.  History of renal transplant .   2.  History of C. difficile colitis.   3.  History of VRE and ESBL UTIs.  4.  Chronic indwelling Johnson with recurrent UTI.  As noted above, recently pansensitive E. coli and citrobacter sensitive to cefepime.   5.  Chronic immunosuppression secondary to above; on tacrolimus and prednisone for renal transplant.   6.  Chronic renal insufficiency secondary to above with baseline creatinine 1.4-1.6.   7.  Hypertension.   8.  Hypothyroidism.   9.  Dyslipidemia.   10.  History of seizures, on Keppra.  Last hospitalization, when presented with AMS and had UTI, EEG in 2018 revealed epileptiform discharges for which the patient required loading with Dilantin.      PAST SURGICAL HISTORY:   1.  Renal transplant in .   2.  Cholecystectomy.   3.  Right total knee arthroplasty complicated by postoperative pulmonary embolism.   4.  Bilateral cataract repair.   5.  Left heart cath 2005 with no flow-limiting lesions.      FAMILY HISTORY:  Mother had diabetes and Parkinson's.  Father had hypertension and heart disease and he  at the age of 56 from pneumonia.      SOCIAL HISTORY:  The patient is a former smoker, having quit in  after acquiring a 17-pack-year history.  No alcohol use.  She is .  Retired.      PRIOR TO  ADMISSION MEDICATIONS:   1.  Tylenol 325-650 mg every 4 hours as needed.   2.  Aspirin 81 mg daily.   3.  Baclofen 10 mg 3 times daily as needed for spasms.   4.  Clonidine 0.1 mg twice daily.   5.  Cranberry 400 mg twice daily.   6.  Vitamin B12 injection 1000 mcg into muscle every 30 days.   7.  Pepcid 20 mg twice daily.   8.  Neurontin 600 mg twice daily.   9.  Hydrocortisone cream topically twice daily as needed.   10.  Aspart using an insulin sliding scale 3 times daily with meals.   11.  Lantus 22 units at bedtime.   12.  Labetalol 150 mg twice daily.   13.  Culturelle 1 capsule twice daily.   14.  Keppra 750 mg twice daily.   15.  Levothyroxine 50 mcg daily.   16.  Lidocaine 2% topical gel applied daily as needed for pain.   17.  Claritin 10 mg daily.   18.  Zofran 4 mg daily as needed for nausea.   19.  Pravastatin 10 mg daily.   20.  Prednisone 5 mg daily.   21.  Simethicone 125 mg daily as needed for intestinal gas.   22.  Tacrolimus 2.5 mg twice daily.   23.  Vitamin D 4000 units twice daily.      REVIEW OF SYSTEMS:  A 14-point comprehensive review of systems attempted, but unable to complete secondary to altered mental status.      PHYSICAL EXAMINATION:   VITAL SIGNS:  Blood pressure 156/68, heart rate 64, respirations 18, temperature 97.9 Fahrenheit, SpO2 100% on 2 liters nasal cannula.   HEENT:  Normocephalic, atraumatic.  Pupils are equal and round.  Mucous membranes appear moist.  The patient does not open her eyes to participate in the exam.   LUNGS:  Clear bilaterally.   HEART:  Regular without murmur.   ABDOMEN:  Obese, soft, nontender.   GENITOURINARY:  Basically erythematous dermatitis along the skin folds of the groin and extending between the legs and up the perianal region up into the low back.  There are some satellite red papules.  No significant skin breakdown or obvious pressure sores.  Chronic indwelling Johnson with clear yellow urine.   EXTREMITIES:  The patient is not currently moving  her extremities.  Bulk and tone appear to be within normal limits.  No significant lower extremity edema.   NEUROLOGIC:  Again, unable to assess secondary to altered mental status.      LABORATORY DATA:  WBC 7.1, hemoglobin 12.4, platelet 129.  Sodium 140, potassium 5.2, chloride 106, bicarb 27, BUN 60, creatinine 1.52, GFR 34.  LFTs within normal limits.  Lactic acid 1.4.  Procalcitonin 0.07.  Troponin less than 0.015.  Glucose 224.  UA showing cloudy yellow urine with 100 glucose, negative bilirubin, greater than 600 protein, nitrite positive, large blood, large leukocyte esterase, greater than 182 WBC, greater than 182 RBC, WBC clumps and yeast present.  Urine culture pending.      IMAGING:  Chest x-ray without acute abnormality.  CT head without acute abnormality.      ASSESSMENT:  A 72-year-old lady with a history of renal transplant in 1999 on chronic immunosuppression with prednisone and tacrolimus, chronic indwelling Johnson with frequent urinary tract infections including citrobacter, pansensitive E. coli and VRE in the past, history of seizures and history of altered mental status when presenting with a urinary tract infection who presents to [facility] with altered mental status.      PLAN:   1.  Altered mental status.  Suspect toxic encephalopathy.  UA grossly positive.  We will get Urology involved given the patient's indwelling Johnson.  May need chronic suppressive antibiotics.  We will restart cefepime given history of citrobacter that was resistant to many antibiotics including most cephalosporins, Bactrim and Zosyn.  Enteric precautions given history of VRE and ESBL.  Gently hydrate.  Hold oral medications until mental status improves.  Telemetry.  Trend fever curve.  Monitor for return of urine culture and blood cultures.   2.  Diabetes mellitus.  We will give half dose of Lantus in addition to n.p.o. sliding scale.    3.  Hypertension. Hold off on clonidine at present time.  Labetalol  scheduled with hold parameters.  4.  Seizures.  Keppra continued IV.   5.  History of renal transplant.  Discussed with pharmacy; will see if we have tacrolimus available.  Hold prednisone for time being.  6.  Hypothyroidism.  We will hold levothyroxine at present time.   7.  Dermatitis.  Suspect this is candidal dermatitis.  Continue with Miconazole powder at present time, but may need Diflucan the future.      This case was discussed with Dr. Veronica of the Hospitalist Service who agrees with the above assessment and plan and will make changes based on his physical exam.      SYLVIA VERONICA MD       As dictated by SHARAD HARDWICK     Patient seen and examined.  Agree with impression and plan.     Sylvia Veronica  Pager: [phone]  Cell Phone:  [phone]             D: 07/05/2018   T: 07/05/2018   MT: ROMERO                      Document: [id number]

## 2018-07-07 LAB
ANION GAP SERPL CALCULATED.3IONS-SCNC: 9 MMOL/L (ref 3–14)
BACTERIA SPEC CULT: ABNORMAL
BUN SERPL-MCNC: 35 MG/DL (ref 7–30)
CALCIUM SERPL-MCNC: 8.9 MG/DL (ref 8.5–10.1)
CHLORIDE SERPL-SCNC: 115 MMOL/L (ref 94–109)
CO2 SERPL-SCNC: 22 MMOL/L (ref 20–32)
CREAT SERPL-MCNC: 1.18 MG/DL (ref 0.52–1.04)
ERYTHROCYTE [DISTWIDTH] IN BLOOD BY AUTOMATED COUNT: 14 % (ref 10–15)
GFR SERPL CREATININE-BSD FRML MDRD: 45 ML/MIN/1.7M2
GLUCOSE BLDC GLUCOMTR-MCNC: 60 MG/DL (ref 70–99)
GLUCOSE BLDC GLUCOMTR-MCNC: 61 MG/DL (ref 70–99)
GLUCOSE BLDC GLUCOMTR-MCNC: 66 MG/DL (ref 70–99)
GLUCOSE BLDC GLUCOMTR-MCNC: 78 MG/DL (ref 70–99)
GLUCOSE BLDC GLUCOMTR-MCNC: 81 MG/DL (ref 70–99)
GLUCOSE BLDC GLUCOMTR-MCNC: 85 MG/DL (ref 70–99)
GLUCOSE BLDC GLUCOMTR-MCNC: 86 MG/DL (ref 70–99)
GLUCOSE BLDC GLUCOMTR-MCNC: 87 MG/DL (ref 70–99)
GLUCOSE BLDC GLUCOMTR-MCNC: 91 MG/DL (ref 70–99)
GLUCOSE SERPL-MCNC: 83 MG/DL (ref 70–99)
HCT VFR BLD AUTO: 37.2 % (ref 35–47)
HGB BLD-MCNC: 11.9 G/DL (ref 11.7–15.7)
MCH RBC QN AUTO: 30.1 PG (ref 26.5–33)
MCHC RBC AUTO-ENTMCNC: 32 G/DL (ref 31.5–36.5)
MCV RBC AUTO: 94 FL (ref 78–100)
PLATELET # BLD AUTO: 128 10E9/L (ref 150–450)
POTASSIUM SERPL-SCNC: 3.9 MMOL/L (ref 3.4–5.3)
RBC # BLD AUTO: 3.95 10E12/L (ref 3.8–5.2)
SODIUM SERPL-SCNC: 146 MMOL/L (ref 133–144)
SPECIMEN SOURCE: ABNORMAL
WBC # BLD AUTO: 6.8 10E9/L (ref 4–11)

## 2018-07-07 PROCEDURE — 85027 COMPLETE CBC AUTOMATED: CPT | Performed by: HOSPITALIST

## 2018-07-07 PROCEDURE — 25000128 H RX IP 250 OP 636: Performed by: PHYSICIAN ASSISTANT

## 2018-07-07 PROCEDURE — 80048 BASIC METABOLIC PNL TOTAL CA: CPT | Performed by: INTERNAL MEDICINE

## 2018-07-07 PROCEDURE — 25000131 ZZH RX MED GY IP 250 OP 636 PS 637: Mod: GY | Performed by: PHYSICIAN ASSISTANT

## 2018-07-07 PROCEDURE — 99232 SBSQ HOSP IP/OBS MODERATE 35: CPT | Performed by: HOSPITALIST

## 2018-07-07 PROCEDURE — 25000128 H RX IP 250 OP 636: Performed by: INTERNAL MEDICINE

## 2018-07-07 PROCEDURE — 25000132 ZZH RX MED GY IP 250 OP 250 PS 637: Mod: GY | Performed by: PHYSICIAN ASSISTANT

## 2018-07-07 PROCEDURE — 00000146 ZZHCL STATISTIC GLUCOSE BY METER IP

## 2018-07-07 PROCEDURE — A9270 NON-COVERED ITEM OR SERVICE: HCPCS | Mod: GY | Performed by: HOSPITALIST

## 2018-07-07 PROCEDURE — 25000132 ZZH RX MED GY IP 250 OP 250 PS 637: Mod: GY | Performed by: HOSPITALIST

## 2018-07-07 PROCEDURE — 25800025 ZZH RX 258: Performed by: PHYSICIAN ASSISTANT

## 2018-07-07 PROCEDURE — 25000125 ZZHC RX 250: Performed by: HOSPITALIST

## 2018-07-07 PROCEDURE — 36415 COLL VENOUS BLD VENIPUNCTURE: CPT | Performed by: INTERNAL MEDICINE

## 2018-07-07 PROCEDURE — 25000131 ZZH RX MED GY IP 250 OP 636 PS 637: Mod: GY | Performed by: HOSPITALIST

## 2018-07-07 PROCEDURE — 12000000 ZZH R&B MED SURG/OB

## 2018-07-07 RX ORDER — GABAPENTIN 300 MG/1
600 CAPSULE ORAL 2 TIMES DAILY
Status: DISCONTINUED | OUTPATIENT
Start: 2018-07-07 | End: 2018-07-08 | Stop reason: HOSPADM

## 2018-07-07 RX ORDER — PREDNISONE 5 MG/1
5 TABLET ORAL DAILY
Status: DISCONTINUED | OUTPATIENT
Start: 2018-07-07 | End: 2018-07-08 | Stop reason: HOSPADM

## 2018-07-07 RX ORDER — LEVETIRACETAM 750 MG/1
750 TABLET ORAL 2 TIMES DAILY
Status: DISCONTINUED | OUTPATIENT
Start: 2018-07-07 | End: 2018-07-08 | Stop reason: HOSPADM

## 2018-07-07 RX ADMIN — GABAPENTIN 600 MG: 300 CAPSULE ORAL at 22:46

## 2018-07-07 RX ADMIN — PREDNISONE 5 MG: 5 TABLET ORAL at 13:18

## 2018-07-07 RX ADMIN — LEVETIRACETAM 750 MG: 100 INJECTION, SOLUTION INTRAVENOUS at 09:59

## 2018-07-07 RX ADMIN — INSULIN GLARGINE 11 UNITS: 100 INJECTION, SOLUTION SUBCUTANEOUS at 22:46

## 2018-07-07 RX ADMIN — CEFEPIME HYDROCHLORIDE 1 G: 1 INJECTION, POWDER, FOR SOLUTION INTRAMUSCULAR; INTRAVENOUS at 22:37

## 2018-07-07 RX ADMIN — MICONAZOLE NITRATE: 2 POWDER TOPICAL at 09:04

## 2018-07-07 RX ADMIN — LEVETIRACETAM 750 MG: 750 TABLET, FILM COATED ORAL at 22:46

## 2018-07-07 RX ADMIN — CEFEPIME HYDROCHLORIDE 1 G: 1 INJECTION, POWDER, FOR SOLUTION INTRAMUSCULAR; INTRAVENOUS at 08:54

## 2018-07-07 RX ADMIN — DEXTROSE AND SODIUM CHLORIDE: 5; 900 INJECTION, SOLUTION INTRAVENOUS at 02:52

## 2018-07-07 RX ADMIN — LABETALOL HYDROCHLORIDE 10 MG: 5 INJECTION, SOLUTION INTRAVENOUS at 08:53

## 2018-07-07 RX ADMIN — TACROLIMUS 2.5 MG: 0.5 CAPSULE ORAL at 13:19

## 2018-07-07 RX ADMIN — GABAPENTIN 600 MG: 300 CAPSULE ORAL at 13:18

## 2018-07-07 RX ADMIN — MICONAZOLE NITRATE: 2 POWDER TOPICAL at 23:44

## 2018-07-07 RX ADMIN — TACROLIMUS 2.5 MG: 0.5 CAPSULE ORAL at 19:11

## 2018-07-07 RX ADMIN — ENOXAPARIN SODIUM 40 MG: 40 INJECTION SUBCUTANEOUS at 19:27

## 2018-07-07 RX ADMIN — Medication 150 MG: at 13:18

## 2018-07-07 RX ADMIN — Medication 150 MG: at 19:10

## 2018-07-07 RX ADMIN — DEXTROSE MONOHYDRATE 25 ML: 500 INJECTION PARENTERAL at 02:04

## 2018-07-07 RX ADMIN — SODIUM CHLORIDE, POTASSIUM CHLORIDE, SODIUM LACTATE AND CALCIUM CHLORIDE: 600; 310; 30; 20 INJECTION, SOLUTION INTRAVENOUS at 01:53

## 2018-07-07 RX ADMIN — LABETALOL HYDROCHLORIDE 10 MG: 5 INJECTION, SOLUTION INTRAVENOUS at 01:50

## 2018-07-07 ASSESSMENT — ACTIVITIES OF DAILY LIVING (ADL)
AMBULATION: 3-->ASSISTIVE EQUIPMENT AND PERSON
TOILETING: 3-->ASSISTIVE EQUIPMENT AND PERSON
DRESS: 2-->ASSISTIVE PERSON
SWALLOWING: 0-->SWALLOWS FOODS/LIQUIDS WITHOUT DIFFICULTY
TRANSFERRING: 3-->ASSISTIVE EQUIPMENT AND PERSON
BATHING: 3-->ASSISTIVE EQUIPMENT AND PERSON
RETIRED_COMMUNICATION: 2-->DIFFICULTY UNDERSTANDING (NOT RELATED TO LANGUAGE BARRIER)
RETIRED_EATING: 0-->INDEPENDENT
COGNITION: 1 - ATTENTION OR MEMORY DEFICITS

## 2018-07-07 NOTE — PROGRESS NOTES
Dx: AMS d/t UTI.   Hx: Kidney Transplant, DM, C-Diff, VRE/ESBL, HTN, Hypothyroidism, Dyslipidemia, and Seizures.   Labs: BG's q 4 hours: 75, 60 - Treated w/ 25 mL of IV D5, 91, 61 - Treated w/ 25 mL of IV D5 and switched to D5NS, and 78.  Diet: NPO   Tele: NSR  Assessment: Elevated BP's controlled w/  Scheduled IV Labetolol, otherwise all other VSS on RA. Disoriented to Place, Time, and Situation. Denies SOB/CP/Pain. Assist of 2 w/ lift. ROBBY full neuros d/t unable to follow command. LS: Diminished. PIV infusing D5NS @ 125 mL/hr, and Prograf @ 3.5 mL/hr. Excoriated bottom/abdominal folds, antifungal and barrier applied. Contact Isolation Maintained.   Plan: Continue POC. D/C TBD following clinical improvement.

## 2018-07-07 NOTE — PLAN OF CARE
Problem: Patient Care Overview  Goal: Plan of Care/Patient Progress Review  Discharge Planner PT   Patient plan for discharge: Return home.   Current status: Noted new PT consult. Per PT note on 7/6, no PT needs indicated as patient is wheelchair bound at baseline and uses a christiana lift. No IP PT needs indicated, will complete PT order. Recommend nursing assist pt up to chair with use of ceiling lift.   Barriers to return to prior living situation: None indicated.   Recommendations for discharge: Return home with prior level of assist.   Rationale for recommendations: No IP PT needs indicated, pt is at baseline.        Entered by: Tali Mathew 07/07/2018 12:07 PM

## 2018-07-07 NOTE — PLAN OF CARE
Problem: Patient Care Overview  Goal: Plan of Care/Patient Progress Review  Outcome: Improving  Alert to self only, forgetful. Follows commands. VSS on RA. Tele NSR. IV saline locked. Johnson intact and patent. BG was 85 and 86. On diabetic/renal diet. Excoriation present on buttocks. Turn and repo Q2. Denied pain. ID and nephrology following. Fall precautions in place. Will cont to monitor.

## 2018-07-07 NOTE — PROGRESS NOTES
"[facility]  Hospitalist Progress Note        Mark Anthony Rutherford MD  07/07/2018      Interval History:  Alert today, much more interactive. No new complaints. tolerated oral intake, resuming diet,         Assessment and Plan:This sis a 72-y/o woman with PMH significant for  ESRD s/p renal transplant in 1999 on chronic immunosuppression with prednisone and tacrolimus, chronic indwelling Johnson with frequent urinary tract infections including citrobacter, pansensitive E. coli and VRE in the past, history of seizures and history of altered mental status when presenting with a urinary tract infection who presents to [facility] with altered mental status.            Acute encephalopathy   catheter associated urinary tract infection  Sepsis of urinary source    History MDR   Encephalopathy resolved,    - - DC- IVF, resume oral diet,  - -  Change Medications to PO  - - Ordered PT/OT  - - Continue broad spectrum Abx, now on cefepime  - - ID following for abx management In setting prior infections with MDR pathogens     Diabetes mellitus.  We will give half dose of Lantus in addition to n.p.o. sliding scale.       Hypertension. Hold off on clonidine at present time.  Labetalol scheduled with hold parameters.    Seizures.  Keppra continued IV.     History of renal transplant.     - -  Nephrology following      Hypothyroidism.  Resume levothyroxine at present time.     Dermatitis.  Suspect this is candidal dermatitis.  Continue with Miconazole powder at present time, but may need Diflucan the future    DVT prophylaxis;   CODE; FULL   Dispo; Pending clinical improvement                    Physical Exam:      Heart Rate: 72, Blood pressure 189/89, pulse 78, temperature 98.2  F (36.8  C), temperature source Oral, resp. rate 16, height 1.676 m (5' 6\"), weight 100.6 kg (221 lb 12.5 oz), SpO2 98 %, not currently breastfeeding.  Vitals:    07/05/18 1726 07/06/18 0651 07/07/18 0642   Weight: 145.2 kg (320 " lb 1.7 oz) 97.6 kg (215 lb 2.7 oz) 100.6 kg (221 lb 12.5 oz)     Vital Signs with Ranges  Temp:  [97.2  F (36.2  C)-99.1  F (37.3  C)] 98.2  F (36.8  C)  Heart Rate:  [72-81] 72  Resp:  [16] 16  BP: (140-201)/(52-89) 189/89  SpO2:  [97 %-99 %] 98 %  I/O's Last 24 hours  I/O last 3 completed shifts:  In: 2195.4 [I.V.:2195.4]  Out: 2425 [Urine:2425]    Constitutional:  Alert in NAD   Lungs: CTA no wheezing   Cardiovascular: RRR no murmurs   Abdomen: Obese, soft, rico in palce   Skin: Warm dry no rashes   Other:           Medications:          ceFEPIme (MAXIPIME) IV  1 g Intravenous Q12H     enoxaparin  40 mg Subcutaneous Q24H     insulin aspart  1-6 Units Subcutaneous Q4H     insulin glargine  11 Units Subcutaneous At Bedtime     labetalol  10 mg Intravenous Q6H     levETIRAcetam  750 mg Intravenous Q12H     methylPREDNISolone  10 mg Intravenous Q24H     miconazole   Topical BID     PRN Meds: acetaminophen, glucose **OR** dextrose **OR** glucagon, magnesium sulfate, naloxone, ondansetron **OR** ondansetron, prochlorperazine **OR** prochlorperazine **OR** prochlorperazine         Data:      All new lab and imaging data was reviewed.   Recent Labs   Lab Test  07/05/18   1418  07/03/18   1235  06/08/18   1215   11/06/17   1652   04/26/17   2338  04/26/17   0638   WBC  7.1  6.3  5.2   < >  13.5*   < >  5.0  5.1   HGB  12.4  10.9*  9.5*   < >  12.7   < >  9.6*  9.7*   MCV  94  94  94   < >  96   < >  92  92   PLT  129*  150  151   < >  171   < >  105*  91*   INR   --    --    --    --   1.09   --   1.03  1.06    < > = values in this interval not displayed.      Recent Labs   Lab Test  07/07/18   0830  07/06/18   0906  07/05/18   1418   NA  146*  144  140   POTASSIUM  3.9  4.4  5.2   CHLORIDE  115*  115*  106   CO2  22  20  27   BUN  35*  44*  60*   CR  1.18*  1.24*  1.52*   ANIONGAP  9  9  7   ROGER  8.9  8.5  9.3   GLC  83  84  224*     Recent Labs   Lab Test  07/05/18   1418  11/13/17   1613  04/19/17   0005   TROPI   <0.015  <0.015  <0.015  The 99th percentile for upper reference range is 0.045 ug/L.  Troponin values in   the range of 0.045 - 0.120 ug/L may be associated with risks of adverse   clinical events.        Mark Anthony Rutherford MD  Internal Medicine, Hospitalist  Pager#; [phone]

## 2018-07-07 NOTE — PROGRESS NOTES
[facility]    Infectious Disease Progress Note    Date of Service (when I saw the patient): 07/07/2018     Assessment & Plan   Gem Jade is a 72 year old female who was admitted on 7/5/2018.     Impression:  1. 72 y.o with ESRD. S/p renal transplant in 1999.   2. Tacrolimus and prednisone use for immunosuppression.   3. Recurrent UTI.   4. Recurrent admission for encephalopathy related or times unrelated to urine infection.   5. UA dirty, citrobacter in urine culture.   6. On cefepime .      Recommendations   Continue on cefepime Citrobacter susceptible to it.       Paramjit Mckenzie MD    Interval History   Afebrile     Physical Exam   Temp: 98.2  F (36.8  C) Temp src: Oral BP: 189/89   Heart Rate: 72 Resp: 16 SpO2: 98 % O2 Device: None (Room air)    Vitals:    07/05/18 1726 07/06/18 0651 07/07/18 0642   Weight: 145.2 kg (320 lb 1.7 oz) 97.6 kg (215 lb 2.7 oz) 100.6 kg (221 lb 12.5 oz)     Vital Signs with Ranges  Temp:  [97.2  F (36.2  C)-99.1  F (37.3  C)] 98.2  F (36.8  C)  Heart Rate:  [72-81] 72  Resp:  [16] 16  BP: (140-201)/(52-89) 189/89  SpO2:  [97 %-99 %] 98 %    Constitutional: Awake, alert, cooperative, no apparent distress  Lungs: Clear to auscultation bilaterally, no crackles or wheezing  Cardiovascular: Regular rate and rhythm, normal S1 and S2, and no murmur noted  Abdomen: Normal bowel sounds, soft, non-distended, non-tender  Skin: No rashes, no cyanosis, no edema  Other:    Medications     dextrose 5% and 0.9% NaCl 125 mL/hr at 07/07/18 0252     tacrolimus (Prograf) infusion ADULT 0.07 mg/hr (07/06/18 1922)       ceFEPIme (MAXIPIME) IV  1 g Intravenous Q12H     enoxaparin  40 mg Subcutaneous Q24H     insulin aspart  1-6 Units Subcutaneous Q4H     insulin glargine  11 Units Subcutaneous At Bedtime     labetalol  10 mg Intravenous Q6H     levETIRAcetam  750 mg Intravenous Q12H     methylPREDNISolone  10 mg Intravenous Q24H     miconazole   Topical BID       Data   All microbiology  laboratory data reviewed.  Recent Labs   Lab Test  07/07/18   0830  07/05/18   1418  07/03/18   1235   WBC  6.8  7.1  6.3   HGB  11.9  12.4  10.9*   HCT  37.2  38.5  33.9*   MCV  94  94  94   PLT  128*  129*  150     Recent Labs   Lab Test  07/07/18   0830  07/06/18   0906  07/05/18   1418   CR  1.18*  1.24*  1.52*     Recent Labs   Lab Test  05/25/16   2116   SED  53*     Recent Labs   Lab Test  07/05/18   1545  07/05/18   1515  07/05/18   1418  05/30/18   0845  05/27/18   0855  05/25/18   0225  05/25/18   0155  05/25/18   0126  02/09/18   1530   CULT  >100,000 colonies/mL  Citrobacter freundii complex  *  No growth after 2 days  No growth after 2 days  No growth  No growth  No growth  >100,000 colonies/mL  Escherichia coli  *  Cultured on the 1st day of incubation:  Escherichia coli  *  Critical Value/Significant Value, preliminary result only, called to and read back by   CONCETTA HILARIO RN [facility] @ 1405 5.25.18 JE    (Note)  POSITIVE for E.COLI by Verigene multiplex nucleic acid test. Final  identification and antimicrobial susceptibility testing will be  verified by standard methods. Verigene test will not distinguish  E.coli from Shigella species including S.dysenteriae, S.flexneri,  S.boydii, and S.sonnei. Specimens containing Shigella species or  E.coli will be reported as Positive for E.coli.    Specimen tested with Verigene multiplex, gram-negative blood culture  nucleic acid test for the following targets: Acinetobacter sp.,  Citrobacter sp., Enterobacter sp., Proteus sp., E. coli, K.  pneumoniae/oxytoca, P. aeruginosa, and the following resistance  markers: CTXM, KPC, NDM, VIM, IMP and OXA.    Critical Value/Significant Value called to and read back by CONCETTA HILARIO RN @1621 5/25/18. CT    >100,000 colonies/mL  Citrobacter freundii complex  *

## 2018-07-07 NOTE — PROGRESS NOTES
"SP[facility]UAL [facility] Progress Note  [facility] 67    Visited pt per SH consult request. Pt seemed to welcome visit, but had a very difficult time coming up with words. When SH asked pt questions, she paused for a very long time trying to locate an answer, and seemed to tear up in the process. Pt asked SH to read what was on her room whiteboard several times. Pt did verbalize that \"she doesn't know where she is\" and \"she doesn't know what is happening\". Pt welcomed prayer, and after, SH offered to sing a hymn, but pt shook her head saying, \"I need my ...I need my .\"      provided care in presence, appropriate touch, and prayer.  notified nursing staff that Pt had called out for her .    Pt appears to be in an altered mental state due to her infection, per her chart.      will follow-up with this pt as her stay persists.    Jessica Reyes   Intern  "

## 2018-07-07 NOTE — PROGRESS NOTES
Kidney function is improved.  She is awake enough to take oral medicines.  She is back on her usual oral immune suppresion.    Following.    Chang Marina MD

## 2018-07-08 VITALS
OXYGEN SATURATION: 96 % | BODY MASS INDEX: 36 KG/M2 | SYSTOLIC BLOOD PRESSURE: 161 MMHG | TEMPERATURE: 98.2 F | HEART RATE: 78 BPM | DIASTOLIC BLOOD PRESSURE: 79 MMHG | RESPIRATION RATE: 16 BRPM | WEIGHT: 223.99 LBS | HEIGHT: 66 IN

## 2018-07-08 DIAGNOSIS — Z53.9 DIAGNOSIS NOT YET DEFINED: Primary | ICD-10-CM

## 2018-07-08 LAB
ANION GAP SERPL CALCULATED.3IONS-SCNC: 10 MMOL/L (ref 3–14)
BUN SERPL-MCNC: 28 MG/DL (ref 7–30)
CALCIUM SERPL-MCNC: 8.8 MG/DL (ref 8.5–10.1)
CHLORIDE SERPL-SCNC: 111 MMOL/L (ref 94–109)
CO2 SERPL-SCNC: 21 MMOL/L (ref 20–32)
CREAT SERPL-MCNC: 1.21 MG/DL (ref 0.52–1.04)
GFR SERPL CREATININE-BSD FRML MDRD: 44 ML/MIN/1.7M2
GLUCOSE BLDC GLUCOMTR-MCNC: 100 MG/DL (ref 70–99)
GLUCOSE BLDC GLUCOMTR-MCNC: 104 MG/DL (ref 70–99)
GLUCOSE BLDC GLUCOMTR-MCNC: 108 MG/DL (ref 70–99)
GLUCOSE BLDC GLUCOMTR-MCNC: 79 MG/DL (ref 70–99)
GLUCOSE SERPL-MCNC: 96 MG/DL (ref 70–99)
POTASSIUM SERPL-SCNC: 3.8 MMOL/L (ref 3.4–5.3)
SODIUM SERPL-SCNC: 142 MMOL/L (ref 133–144)

## 2018-07-08 PROCEDURE — A9270 NON-COVERED ITEM OR SERVICE: HCPCS | Mod: GY | Performed by: HOSPITALIST

## 2018-07-08 PROCEDURE — 80048 BASIC METABOLIC PNL TOTAL CA: CPT | Performed by: HOSPITALIST

## 2018-07-08 PROCEDURE — 00000146 ZZHCL STATISTIC GLUCOSE BY METER IP

## 2018-07-08 PROCEDURE — G0179 MD RECERTIFICATION HHA PT: HCPCS

## 2018-07-08 PROCEDURE — 25000132 ZZH RX MED GY IP 250 OP 250 PS 637: Mod: GY | Performed by: HOSPITALIST

## 2018-07-08 PROCEDURE — 36415 COLL VENOUS BLD VENIPUNCTURE: CPT | Performed by: HOSPITALIST

## 2018-07-08 PROCEDURE — 25000125 ZZHC RX 250: Performed by: HOSPITALIST

## 2018-07-08 PROCEDURE — 25000131 ZZH RX MED GY IP 250 OP 636 PS 637: Mod: GY | Performed by: HOSPITALIST

## 2018-07-08 PROCEDURE — 25000128 H RX IP 250 OP 636: Performed by: PHYSICIAN ASSISTANT

## 2018-07-08 PROCEDURE — 80197 ASSAY OF TACROLIMUS: CPT | Performed by: INTERNAL MEDICINE

## 2018-07-08 PROCEDURE — 99239 HOSP IP/OBS DSCHRG MGMT >30: CPT | Performed by: HOSPITALIST

## 2018-07-08 RX ORDER — CIPROFLOXACIN 500 MG/1
500 TABLET, FILM COATED ORAL 2 TIMES DAILY
Qty: 20 TABLET | Refills: 0 | Status: SHIPPED | OUTPATIENT
Start: 2018-07-08 | End: 2019-02-12

## 2018-07-08 RX ADMIN — PREDNISONE 5 MG: 5 TABLET ORAL at 08:39

## 2018-07-08 RX ADMIN — LEVETIRACETAM 750 MG: 750 TABLET, FILM COATED ORAL at 08:39

## 2018-07-08 RX ADMIN — GABAPENTIN 600 MG: 300 CAPSULE ORAL at 08:39

## 2018-07-08 RX ADMIN — CEFEPIME HYDROCHLORIDE 1 G: 1 INJECTION, POWDER, FOR SOLUTION INTRAMUSCULAR; INTRAVENOUS at 08:38

## 2018-07-08 RX ADMIN — Medication 150 MG: at 08:39

## 2018-07-08 RX ADMIN — TACROLIMUS 2.5 MG: 0.5 CAPSULE ORAL at 08:39

## 2018-07-08 RX ADMIN — MICONAZOLE NITRATE: 2 POWDER TOPICAL at 08:38

## 2018-07-08 NOTE — CONSULTS
Care Transition Initial Assessment -    Following for D/C planning per MD order.  Met with: patient  Principal Problem:    Altered Mental Status, Probable Metabolic encephalopathy  Active Problems:    Diabetic gastroparesis (H)    Recurrent UTI    Benign essential hypertension    Diabetic polyneuropathy associated with type 2 diabetes mellitus (H)    Paroxysmal a-fib (H)    Obstructive sleep apnea syndrome    Chronic indwelling Johnson catheter    Seizure disorder (H)    Dermatitis       DATA  Lives With: spouse  Discharge is anticipated for today per nursing and patient anxious to leave as early as d/c orders are completed.  Patient well known to social work staff due to history of hosptializations.  Met with patient to update her records.  Patient continues to live at home with her  who also is her caregiver.  Patient is open to [facility] for RN and HHA visits which are twice a week.  Patient is w/c bound.  She reports [facility] staff are working with her to fit her for a different w/c that will provide her with better support.  She has an adjustment bed, christiana lift and w/c.  Patient requests BLS transport home as she has difficult with vertigo and open skin on there coccyx.  Her home is equipped with a ramp  Patient chose a 1400 BLS ride with [facility].  [facility] notified of d/c today.  Identified issues/concerns regarding health management: Patient appears at baseline.Need to notify [facility] to resume services.    ASSESSMENT  Cognitive Status:  Appears alert and oriented X4.  Concerns to be addressed: none   PLAN  Financial costs for the patient includes:  Patient reports she usually has partial payment for stretcher transport.  Patient given options and choices for discharge yes  Patient/family is agreeable to the plan?  yes  Patient Goals and Preferences: d/c home today.  Patient anticipates discharging to: d/c home today.

## 2018-07-08 NOTE — PLAN OF CARE
Problem: Patient Care Overview  Goal: Plan of Care/Patient Progress Review  Outcome: Improving  Pt is A&O to and place. Confused and forgetful per baseline. Very interactive this evening. VSS on RA with clear LS throughout. Tele NSR. Denies pain or any other discomforts. Total care with lift assist. T/RQ2H. Diabetic/renal diet with fair appetite. BG 87/81. Johnson intact and draining adequately. Large sift BM. Excoriated area cleaned with soap and water and applied treatment powder. PIV SL and intact. ID and nephrology following.

## 2018-07-08 NOTE — DISCHARGE SUMMARY
[facility]  Discharge Summary        Gem Jade MRN# [id number]   YOB: 1945 Age: 72 year old     Date of Admission:  7/5/2018  Date of Discharge:  7/8/2018  Admitting Physician:  Gregg Knott MD  Discharge Physician: Mark Anthony Rutherford MD  Discharging Service: Hospitalist     Primary Provider: Marivel Barcenas  Primary Care Physician Phone Number: [phone]         Discharge Diagnoses/Problem Oriented Hospital Course (Providers):    Gem Jaed was admitted on 7/5/2018 by Gregg Knott MD and I would refer you to their history and physical.  The following problems were addressed during her hospitalization:  Please refer to the H&P , Nephrology and Infectious disease notes for the details of this presentation, In brief,This is a 72-y/o woman with PMH significant for  ESRD s/p renal transplant in 1999 on chronic immunosuppression with prednisone and tacrolimus, chronic indwelling Johnson with frequent urinary tract infections including citrobacter, pansensitive E. coli and VRE in the past, history of seizures and history of altered mental status when presenting with a urinary tract infection who presents to [facility] with encephalopathy though to be secondary yo urinary tract infection.   Following is a list of major problems during this stay;   Acute encephalopathy   catheter associated urinary tract infection  Sepsis of urinary source    History MDR   Encephalopathy resolved,     culture grew Citrobacter, Initially treated with Intravenous Abx( Cefepime) and discharged with oral ciprofloxacin fro 10 days per ID recommendation.     Diabetes mellitus.  managed with Insulin.      Hypertension. Hold off on clonidine at present time.  Labetalol scheduled with hold parameters.     Seizures.  Keppra continued .      History of renal transplant.     - -  Nephrology followed during he stay, no change to  immunosuppressant  therapy.        Hypothyroidism.  Resume levothyroxine at present time.      Dermatitis.  Suspect this is candidal dermatitis.  Continue with Miconazole powder at present time, but may need Diflucan the future          Code Status:      Full Code        Brief Hospital Stay Summary Sent Home With Patient in AVS:        Reason for your hospital stay       Urinary tract infection                        Important Results:      See below        Pending Results:        Unresulted Labs Ordered in the Past 30 Days of this Admission     Date and Time Order Name Status Description    7/5/2018 1500 Blood culture Preliminary     7/5/2018 1411 Blood culture Preliminary             Discharge Instructions and Follow-Up:      Follow-up Appointments     Follow-up and recommended labs and tests        Follow up with primary care provider, Marivel Barcenas NP,   within 7 days for hospital follow- up.  No follow up labs or test are   needed.                      Discharge Disposition:      Discharged to home        Discharge Medications:        Current Discharge Medication List      CONTINUE these medications which have NOT CHANGED    Details   ACETAMINOPHEN PO Take 325-650 mg by mouth every 4 hours as needed.      aspirin 81 MG tablet Take 81 mg by mouth daily      baclofen (LIORESAL) 10 MG tablet Take 1 tablet (10 mg) by mouth 3 times daily as needed for muscle spasms  Qty: 90 tablet, Refills: 4      CloNIDine HCl (CATAPRES PO) Take 0.1 mg by mouth 2 times daily      Cranberry 400 MG TABS Take 400 mg by mouth 2 times daily      cyanocobalamin (VITAMIN B12) 1000 MCG/ML injection Inject 1 mL (1,000 mcg) into the muscle every 30 days  Qty: 3 mL, Refills: 3    Associated Diagnoses: Iron deficiency anemia, unspecified iron deficiency anemia type      famotidine (PEPCID) 20 MG tablet Take 1 tablet (20 mg) by mouth 2 times daily  Qty: 60 tablet, Refills: 1      gabapentin (NEURONTIN) 300 MG capsule Take 600 mg by mouth 2 times  daily   Qty: 360 capsule, Refills: 3    Associated Diagnoses: Diabetic polyneuropathy associated with type 2 diabetes mellitus (H)      hydrocortisone 1%/eucerin 1% compounded cream Apply topically 2 times daily  Qty: 30 g, Refills: 1    Associated Diagnoses: Rash and nonspecific skin eruption      insulin aspart (NOVOLOG FLEXPEN) 100 UNIT/ML injection Inject Subcutaneous 3 times daily (with meals) Sliding scale  Qty: 15 mL, Refills: 11    Associated Diagnoses: Type 2 diabetes mellitus with diabetic polyneuropathy, with long-term current use of insulin (H)      insulin glargine (LANTUS SOLOSTAR) 100 UNIT/ML injection Inject 22 Units Subcutaneous At Bedtime   Qty: 15 mL, Refills: 11    Associated Diagnoses: Type 2 diabetes mellitus with diabetic polyneuropathy, with long-term current use of insulin (H)      labetalol (NORMODYNE) 300 MG tablet Take 0.5 tablets (150 mg) by mouth 2 times daily Take 1/2 tablet (150mg) =300mg. Hold for systolic BP less than 120.  Qty: 120 tablet, Refills: 3    Associated Diagnoses: Benign essential hypertension; Kidney replaced by transplant      Lactobacillus Rhamnosus, GG, (CULTURELL) capsule Take 1 capsule by mouth 2 times daily  Qty: 60 capsule, Refills: 11    Associated Diagnoses: Diarrhea      levETIRAcetam (KEPPRA) 750 MG tablet Take 1 tablet (750 mg) by mouth 2 times daily  Qty: 60 tablet, Refills: 11    Associated Diagnoses: Seizure disorder (H)      levothyroxine (SYNTHROID/LEVOTHROID) 50 MCG tablet TAKE ONE TABLET BY MOUTH EVERY DAY  Qty: 90 tablet, Refills: 3    Associated Diagnoses: Other specified hypothyroidism      lidocaine (XYLOCAINE) 2 % topical gel Apply topically as needed for moderate pain 10 ML every 3 weeks  Qty: 30 mL, Refills: 11    Associated Diagnoses: Johnson catheter in place      loratadine (CLARITIN) 10 MG tablet Take 1 tablet (10 mg) by mouth daily  Qty: 90 tablet, Refills: 3    Associated Diagnoses: Acute nasopharyngitis      ondansetron (ZOFRAN ODT) 4 MG  ODT tab Take 1 tablet (4 mg) by mouth daily as needed for nausea 30 minutes before getting up in your wheelchair  Qty: 20 tablet, Refills: 1    Associated Diagnoses: Vertigo      pravastatin (PRAVACHOL) 10 MG tablet Take 1 tablet (10 mg) by mouth daily  Qty: 90 tablet, Refills: 3    Associated Diagnoses: Hyperlipidemia LDL goal <100      predniSONE (DELTASONE) 5 MG tablet Take 1 tablet (5 mg) by mouth daily  Qty: 90 tablet, Refills: 3    Associated Diagnoses: Kidney replaced by transplant      simethicone (MYLICON) 125 MG CHEW Take 1 tablet (125 mg) by mouth as needed for intestinal gas  Qty: 120 tablet      TACROLIMUS PO Take 2.5 mg by mouth 2 times daily      VITAMIN D, CHOLECALCIFEROL, PO Take 4,000 Units by mouth 2 times daily      ACCU-CHEK COMPACT PLUS test strip USE TO TEST BLOOD SUGAR THREE TIMES A DAY OR AS DIRECTED  Qty: 102 each, Refills: 11    Associated Diagnoses: Diabetic polyneuropathy associated with type 2 diabetes mellitus (H)               Allergies:         Allergies   Allergen Reactions     Bactrim Ds [Sulfamethoxazole W/Trimethoprim]      Creatinine level increased     Atorvastatin Calcium      myalgias, muscle aches     Codeine Nausea and Vomiting     Darvocet [Propoxyphene N-Apap] Nausea and Vomiting     Hydromorphone      Levofloxacin Other (See Comments) and Diarrhea     hallucinations     Morphine      Nausea and vomiting     Niaspan [Niacin] GI Disturbance     Flushing even at low dose, GI upset     Percocet [Oxycodone-Acetaminophen]      Vomiting after taking med couple of times     Vicodin [Hydrocodone-Acetaminophen] Nausea and Vomiting           Consultations This Hospital Stay:      Consultation during this admission received from infectious disease and nephrology        Condition and Physical on Discharge:      Discharge condition: Stable   Vitals: Heart Rate: 73, Blood pressure (!) 193/98, pulse 78, temperature 98.2  F (36.8  C), temperature source Oral, resp. rate 16, height 1.676  "m (5' 6\"), weight 101.6 kg (223 lb 15.8 oz), SpO2 96 %, not currently breastfeeding.     Constitutional:  Alert in NAD   Lungs: CTA no wheezing   Cardiovascular: RRR no murmurs   Abdomen: Obese, soft, rico in palce   Skin: Warm dry no rashes   Other:                Discharge Time:      Discharge process has taken ghreater than 30 minutes.        Image Results From This Hospital Stay (For Non-EPIC Providers):        Results for orders placed or performed during the hospital encounter of 07/05/18   XR Chest Port 1 View    Narrative    XR CHEST PORT 1 VW 7/5/2018 3:23 PM     HISTORY: Fever;     COMPARISON: 5/28/2018      Impression    IMPRESSION: The cardiac silhouette is stable. The lungs are clear.    ARTHUR FRANCIS MD   Head CT w/o contrast    Narrative    CT OF THE HEAD WITHOUT CONTRAST 7/5/2018 4:12 PM     COMPARISON: Brain MRI 5/28/2018.    HISTORY: Altered mental status, only minimally responsive to verbal  stimuli.     TECHNIQUE: 5 mm thick axial CT images of the head were acquired  without IV contrast material.    FINDINGS: There is moderate diffuse cerebral volume loss. There are  subtle patchy areas of decreased density in the cerebral white matter  bilaterally that are consistent with sequela of chronic small vessel  ischemic disease.     The ventricles and basal cisterns are within normal limits in  configuration given the degree of cerebral volume loss. There is no  midline shift. There are no extra-axial fluid collections.     No intracranial hemorrhage, mass or recent infarct.    The visualized paranasal sinuses are well-aerated. There is no  mastoiditis. There are no fractures of the visualized bones.       Impression    IMPRESSION: Diffuse cerebral volume loss and cerebral white matter  changes consistent with chronic small vessel ischemic disease. No  evidence for acute intracranial pathology.       Radiation dose for this scan was reduced using automated exposure  control, adjustment of the mA " and/or kV according to patient size, or  iterative reconstruction technique    SHAAN SEVILLA MD     *Note: Due to a large number of results and/or encounters for the requested time period, some results have not been displayed. A complete set of results can be found in Results Review.           Most Recent Lab Results In EPIC (For Non-EPIC Providers):    Most Recent 3 CBC's:  Recent Labs   Lab Test  07/07/18   0830  07/05/18   1418  07/03/18   1235   WBC  6.8  7.1  6.3   HGB  11.9  12.4  10.9*   MCV  94  94  94   PLT  128*  129*  150      Most Recent 3 BMP's:  Recent Labs   Lab Test  07/07/18   0830  07/06/18   0906  07/05/18   1418   NA  146*  144  140   POTASSIUM  3.9  4.4  5.2   CHLORIDE  115*  115*  106   CO2  22  20  27   BUN  35*  44*  60*   CR  1.18*  1.24*  1.52*   ANIONGAP  9  9  7   ROGER  8.9  8.5  9.3   GLC  83  84  224*     Most Recent 3 Troponin's:  Recent Labs   Lab Test  07/05/18   1418  11/13/17   1613  04/19/17   0005   TROPI  <0.015  <0.015  <0.015  The 99th percentile for upper reference range is 0.045 ug/L.  Troponin values in   the range of 0.045 - 0.120 ug/L may be associated with risks of adverse   clinical events.       Most Recent 3 INR's:  Recent Labs   Lab Test  11/06/17   1652  04/26/17   2338  04/26/17   0638   INR  1.09  1.03  1.06     Most Recent 2 LFT's:  Recent Labs   Lab Test  07/05/18   1418  05/28/18   0520   AST  27  17   ALT  42  14   ALKPHOS  91  59   BILITOTAL  0.3  0.3     Most Recent Cholesterol Panel:  Recent Labs   Lab Test  12/05/16   1210   CHOL  164   LDL  64   HDL  46*   TRIG  272*     Most Recent 6 Bacteria Isolates From Any Culture (See EPIC Reports for Culture Details):  Recent Labs   Lab Test  07/05/18   1545  07/05/18   1515  07/05/18   1418  05/30/18   0845  05/27/18   0855  05/25/18   0225   CULT  >100,000 colonies/mL  Citrobacter freundii complex  *  No growth after 3 days  No growth after 3 days  No growth  No growth  No growth     Most Recent TSH, T4 and  HgbA1c:   Recent Labs   Lab Test  05/29/18   0440  05/26/18   0545   03/09/17   1310   TSH  0.85   --    < >  1.20   T4   --    --    --   0.72*   A1C   --   8.8*   < >   --     < > = values in this interval not displayed.

## 2018-07-08 NOTE — PROGRESS NOTES
[facility]    Infectious Disease Progress Note    Date of Service (when I saw the patient): 07/08/2018     Assessment & Plan   Gem Jade is a 72 year old female who was admitted on 7/5/2018.     Impression:  1. 72 y.o with ESRD. S/p renal transplant in 1999.   2. Tacrolimus and prednisone use for immunosuppression.   3. Recurrent UTI.   4. Recurrent admission for encephalopathy related or times unrelated to urine infection.   5. UA dirty, citrobacter in urine culture.   6. On cefepime .      Recommendations   Continue on cefepime Citrobacter susceptible to it.   When ready for d/c can be switched to oral cipro for 10 days.       Paramjit Mckenzie MD    Interval History   Afebrile     Physical Exam   Temp: 98.2  F (36.8  C) Temp src: Oral BP: (!) 193/98   Heart Rate: 73 Resp: 16 SpO2: 96 % O2 Device: None (Room air)    Vitals:    07/06/18 0651 07/07/18 0642 07/08/18 0500   Weight: 97.6 kg (215 lb 2.7 oz) 100.6 kg (221 lb 12.5 oz) 101.6 kg (223 lb 15.8 oz)     Vital Signs with Ranges  Temp:  [98  F (36.7  C)-98.7  F (37.1  C)] 98.2  F (36.8  C)  Heart Rate:  [67-80] 73  Resp:  [14-18] 16  BP: (162-193)/(69-98) 193/98  SpO2:  [96 %-99 %] 96 %    Constitutional: Awake, alert, cooperative, no apparent distress  Lungs: Clear to auscultation bilaterally, no crackles or wheezing  Cardiovascular: Regular rate and rhythm, normal S1 and S2, and no murmur noted  Abdomen: Normal bowel sounds, soft, non-distended, non-tender  Skin: No rashes, no cyanosis, no edema  Other:    Medications       ceFEPIme (MAXIPIME) IV  1 g Intravenous Q12H     enoxaparin  40 mg Subcutaneous Q24H     gabapentin  600 mg Oral BID     insulin aspart  1-6 Units Subcutaneous Q4H     insulin glargine  11 Units Subcutaneous At Bedtime     labetalol  150 mg Oral Q12H HO     levETIRAcetam  750 mg Oral BID     miconazole   Topical BID     predniSONE  5 mg Oral Daily     tacrolimus  2.5 mg Oral BID IS       Data   All microbiology laboratory  data reviewed.  Recent Labs   Lab Test  07/07/18   0830  07/05/18   1418  07/03/18   1235   WBC  6.8  7.1  6.3   HGB  11.9  12.4  10.9*   HCT  37.2  38.5  33.9*   MCV  94  94  94   PLT  128*  129*  150     Recent Labs   Lab Test  07/07/18   0830  07/06/18   0906  07/05/18   1418   CR  1.18*  1.24*  1.52*     Recent Labs   Lab Test  05/25/16   2116   SED  53*     Recent Labs   Lab Test  07/05/18   1545  07/05/18   1515  07/05/18   1418  05/30/18   0845  05/27/18   0855  05/25/18   0225  05/25/18   0155  05/25/18   0126  02/09/18   1530   CULT  >100,000 colonies/mL  Citrobacter freundii complex  *  No growth after 3 days  No growth after 3 days  No growth  No growth  No growth  >100,000 colonies/mL  Escherichia coli  *  Cultured on the 1st day of incubation:  Escherichia coli  *  Critical Value/Significant Value, preliminary result only, called to and read back by   CONCETTA HILARIO RN [facility] @ 1407 5.25.18 JE    (Note)  POSITIVE for E.COLI by Verigene multiplex nucleic acid test. Final  identification and antimicrobial susceptibility testing will be  verified by standard methods. Verigene test will not distinguish  E.coli from Shigella species including S.dysenteriae, S.flexneri,  S.boydii, and S.sonnei. Specimens containing Shigella species or  E.coli will be reported as Positive for E.coli.    Specimen tested with Verigene multiplex, gram-negative blood culture  nucleic acid test for the following targets: Acinetobacter sp.,  Citrobacter sp., Enterobacter sp., Proteus sp., E. coli, K.  pneumoniae/oxytoca, P. aeruginosa, and the following resistance  markers: CTXM, KPC, NDM, VIM, IMP and OXA.    Critical Value/Significant Value called to and read back by CONCETTA HILARIO RN @1621 5/25/18. CT    >100,000 colonies/mL  Citrobacter freundii complex  *

## 2018-07-08 NOTE — PLAN OF CARE
Problem: Patient Care Overview  Goal: Plan of Care/Patient Progress Review  Outcome: Adequate for Discharge Date Met: 07/08/18  A&Ox4. VSS on RA. Total cares; T/R q2h. Renal diet, poor appetite. LS dim. Denies pain. Tele NSR. , 104. Mepilex to coccyx for extra barrier; Micon powder to abd folds. Contact iso maintained. Abx switched to PO.  IV removed.     Discharge    Patient discharged to home via stretcher, by [facility]      Listed belongings gathered and returned to patient. Yes  Care Plan and Patient education resolved: Yes  Prescriptions if needed, hard copies sent with patient  NA  Home and hospital acquired medications returned to patient: Yes  Medication Bin checked and emptied on discharge Yes  Follow up appointment made for patient: Yes

## 2018-07-08 NOTE — PLAN OF CARE
Problem: Patient Care Overview  Goal: Plan of Care/Patient Progress Review  Outcome: Improving  Pt here for altered mental status. A/Ox3 on shift. VSS. O2 RA; 99%. BG 79; pt requested cranberry juice. Tele: NSR. Denies any SOB, chest pain, N/V/D, numbness/tingling. Mepilex, CDI, on coccyx. Contact percautions; VRE, ESBL. Johnson in place. Diabetic/renal diet. Total cares; pt refused turning due to her vertigo symptoms if she lays on the left side. Lift team. Pt teary, wants to go home. DC pending clinical improvement. Nursing will continue to monitor.

## 2018-07-08 NOTE — DISCHARGE INSTRUCTIONS
Resumption of [facility] RN and Home Health Aide. The office will santiago to schedule their visits. Their phone number is [phone].

## 2018-07-09 ENCOUNTER — PATIENT OUTREACH (OUTPATIENT)
Dept: CARE COORDINATION | Facility: CLINIC | Age: 73
End: 2018-07-09

## 2018-07-09 ENCOUNTER — TELEPHONE (OUTPATIENT)
Dept: TRANSPLANT | Facility: CLINIC | Age: 73
End: 2018-07-09

## 2018-07-09 DIAGNOSIS — Z79.60 LONG-TERM USE OF IMMUNOSUPPRESSANT MEDICATION: ICD-10-CM

## 2018-07-09 DIAGNOSIS — T83.511D URINARY TRACT INFECTION ASSOCIATED WITH INDWELLING URETHRAL CATHETER, SUBSEQUENT ENCOUNTER: Primary | ICD-10-CM

## 2018-07-09 DIAGNOSIS — Z94.0 KIDNEY TRANSPLANT RECIPIENT: Primary | ICD-10-CM

## 2018-07-09 DIAGNOSIS — N39.0 URINARY TRACT INFECTION ASSOCIATED WITH INDWELLING URETHRAL CATHETER, SUBSEQUENT ENCOUNTER: Primary | ICD-10-CM

## 2018-07-09 DIAGNOSIS — R41.82 ALTERED MENTAL STATUS, UNSPECIFIED ALTERED MENTAL STATUS TYPE: ICD-10-CM

## 2018-07-09 DIAGNOSIS — G93.40 ENCEPHALOPATHY ACUTE: ICD-10-CM

## 2018-07-09 LAB
TACROLIMUS BLD-MCNC: 25.1 UG/L (ref 5–15)
TME LAST DOSE: ABNORMAL H

## 2018-07-09 NOTE — TELEPHONE ENCOUNTER
ISSUE:  Tacrolimus level 25.1, goal 12-hour level 4-6.    PHONE CALL:  Called patient. She was recently discharged from [facility] after treatment for UTI with metal status changes. While hospitalized her tac dosing schedule was change from 12-12 to 8-8, and the drug level was drawn at 11. She is now at home and back to her 12-12 schedule.    PLAN:  Check tac level taking care to get good 12-hour level  - call/voice message -- Yashira Chiu home care nurse  - lab order placed in Epic

## 2018-07-09 NOTE — TELEPHONE ENCOUNTER
DATE:  7/9/2018   TIME OF RECEIPT FROM LAB:  1007 AM  LAB TEST:  Tacrolimus   LAB VALUE:  25.1  RESULTS GIVEN WITH READ-BACK TO (PROVIDER): Shabbir Jacinto RN  TIME LAB VALUE REPORTED TO PROVIDER:   2167

## 2018-07-09 NOTE — LETTER
[facility] CARE COORDINATION    July 9, 2018    Gem Jade  [address] [place] [address]      Dear Gem,    I am a clinic care coordinator who works with Marivel Barcenas, ALISON, APRN CNP at [facility].  I wanted to introduce myself and provide you with my contact information so that you can call me with questions or concerns about your health care. Below is a description of clinic care coordination and how I can further assist you.     The clinic care coordinator is a registered nurse and/or  who understand the health care system. The goal of clinic care coordination is to help you manage your health and improve access to the [facility] system in the most efficient manner. The registered nurse can assist you in meeting your health care goals by providing education, coordinating services, and strengthening the communication among your providers. The  can assist you with financial, behavioral, psychosocial, chemical dependency, counseling, and/or psychiatric resources.    Please feel free to contact me at [phone], with any questions or concerns. We at [facility] are focused on providing you with the highest-quality healthcare experience possible and that all starts with you.     Sincerely,     Porsha Khalil    Enclosed: I have enclosed a copy of a 24 Hour Access Plan. This has helpful phone numbers for you to call when needed. Please keep this in an easy to access place to use as needed.

## 2018-07-09 NOTE — PROGRESS NOTES
Clinic Care Coordination Contact  Care Team Conversations    tamra: Gemedmundo Jade  : 1945  MRN #: [id number]  Primary Care Provider: Marivel Barcenas NP     Primary Clinic: [address] [place] [address]  [facility] [address]     Reason for Hospitalization:  Encephalopathy acute [G93.40]  Urinary tract infection associated with indwelling urethral catheter, subsequent encounter [T83.511D, N39.0]  Admit Date/Time: 2018  2:04 PM  Discharge Date: 2018  Payor Source: Payor: MEDICA / Plan: MEDICA PRIME SOLUTION / Product Type: Indemnity /     Readmission Assessment Measure (CHETAN) Risk Score/category: High            Reason for Communication Hand-off Referral: Fragility    Discharge Plan:       Concern for non-adherence with plan of care:   Y/N yes  Discharge Needs Assessment:      Follow-up specialty is recommended: No    Follow-up plan:  No future appointments.    Any outstanding tests or procedures:              Key Recommendations:  Patient was admitted to [facility] with a UTI. ID following and recommended oral Cipro for discharge home. Patient going home with spouse and Resumption of home care    Milena Gtz

## 2018-07-09 NOTE — LETTER
Health Care Home - Access Care Plan    About Me  Patient Name:  Gem Shaver    YOB: 1945  Age:                             72 year old   Josephine MRN:            [id number] Telephone Information:     Home Phone [phone]   Mobile [phone]       Address:    [address] [place] [address] Email address:  [email or web address]      Emergency Contact(s)  Name Relationship Lgl Grd Work Phone Home Phone Mobile Phone   1. TAWANDA SHAVER Spouse  none [phone]   2. ANTONY VIVEROS* Daughter  none [phone]             Health Maintenance:      My Access Plan  Medical Emergency 911   Questions or concerns during clinic hours Primary Clinic Line, I will call the clinic directly: [facility] - [phone]   24 Hour Appointment Line [phone] or  [phone]) (toll free)   24 Hour Nurse Line [phone] (toll free)   Questions or concerns outside clinic hours 24 Hour Appointment Line, I will call the after-hours on-call line:   [facility] [place] [phone] or [phone]) (toll-free)   Preferred Urgent Care     Preferred Hospital     Preferred Pharmacy [facility] [address] [place] [facility] [place], [place] - [facility] [address] Behavioral Health Crisis Line The National Suicide Prevention Lifeline at 1-287.279.5979 or 911     My Care Team Members  Patient Care Team       Relationship Specialty Notifications Start End    Kerri-Marivel Cano APRN CNP PCP - General Family Practice  5/24/13     Phone: [phone] Fax: [phone]         [address] [place] [address] [facility] [address]    Monico Heck MD MD Transplant  10/6/15     Comment:  nephrology - [phone]    Phone: [phone] Fax: [phone]         [address] [facility] [address] [facility] [address]    Ross Elizabeth MD MD Nephrology  5/9/17     Phone: [phone] Fax: [phone]         [address] [facility] [address] [facility] [address]    Remi  Kassandra BEARDEN, RN Clinic Care Coordinator Primary Care - CC Admissions 6/4/18     Phone: [phone] Fax: [phone]               My Medical and Care Information  Problem List   Patient Active Problem List   Diagnosis     Kidney replaced by transplant     Primary localized osteoarthrosis, lower leg     Pulmonary embolism and infarction (H)     Diabetes mellitus with background retinopathy (H)     HYPERLIPIDEMIA LDL GOAL <100     Diabetic gastroparesis (H)     Back pain     Compression fx, thoracic spine (H)     Hyperkalemia     Anemia     Clostridium difficile diarrhea     Hydronephrosis     Recurrent UTI     Benign essential hypertension     Hyponatremia     Vancomycin resistant Enterococcus     Clostridium difficile colitis     Diabetic polyneuropathy associated with type 2 diabetes mellitus (H)     Chronic shoulder pain, unspecified laterality     Morbid obesity, unspecified obesity type (H)     Health Care Home     Paroxysmal a-fib (H)     Acute kidney injury (H)     Pancytopenia, acquired (H)     Obstructive sleep apnea syndrome     Altered Mental Status, Probable Metabolic encephalopathy     Chronic indwelling Johnson catheter     Seizure disorder (H)     Dermatitis      Current Medications and Allergies:  See printed Medication Report

## 2018-07-10 ENCOUNTER — TELEPHONE (OUTPATIENT)
Dept: FAMILY MEDICINE | Facility: CLINIC | Age: 73
End: 2018-07-10

## 2018-07-10 ENCOUNTER — ALLIED HEALTH/NURSE VISIT (OUTPATIENT)
Dept: BEHAVIORAL HEALTH | Facility: CLINIC | Age: 73
End: 2018-07-10
Payer: MEDICARE

## 2018-07-10 DIAGNOSIS — Z00.00 ENCOUNTER FOR ROUTINE ADULT HEALTH EXAMINATION WITHOUT ABNORMAL FINDINGS: Primary | ICD-10-CM

## 2018-07-10 LAB
TACROLIMUS BLD-MCNC: 8.4 UG/L (ref 5–15)
TME LAST DOSE: NORMAL H

## 2018-07-10 PROCEDURE — 99207 ZZC NO CHARGE NURSE ONLY: CPT

## 2018-07-10 PROCEDURE — 80197 ASSAY OF TACROLIMUS: CPT | Performed by: INTERNAL MEDICINE

## 2018-07-10 NOTE — TELEPHONE ENCOUNTER
Reason for Call:  Home Health Care    Yashira with  Homecare called requesting orders.    Orders are needed for this patient.    Skilled Nursin x per wk for 5 wks w/ 4 prn visits    Home Health Aid:   1 x for 1 wk, 3 x for 4 wks, 2 x for 1 wk     Pt Provider: Klaudia Burrell Homecare Nurse can be reached at: [phone]    Can we leave a detailed message on this number? yes    Best Time: anytime    Call taken on 7/10/2018 at 3:40 PM by Delma Flores

## 2018-07-10 NOTE — TELEPHONE ENCOUNTER
Received TC from [facility] RN stating patient's catheter just started leaking and is requesting to do another  SNV today.  HC orders reviewed, patient has 4 prn visits, so no need for additional orders.    Shannon Roldan RN  [facility]

## 2018-07-10 NOTE — MR AVS SNAPSHOT
After Visit Summary   7/10/2018    Gem Jade    MRN: [id number]           Patient Information     Date Of Birth          1945        Visit Information        Provider Department      7/10/2018 12:00 PM 2, [facility]        Today's Diagnoses     Encounter for routine adult health examination without abnormal findings    -  1       Follow-ups after your visit        Future tests that were ordered for you today     Open Future Orders        Priority Expected Expires Ordered    Tacrolimus level Routine 7/9/2018 7/31/2018 7/9/2018            Who to contact     If you have questions or need follow up information about today's clinic visit or your schedule please contact [facility] directly at [phone].  Normal or non-critical lab and imaging results will be communicated to you by MyChart, letter or phone within 4 business days after the clinic has received the results. If you do not hear from us within 7 days, please contact the clinic through [employer] or phone. If you have a critical or abnormal lab result, we will notify you by phone as soon as possible.  Submit refill requests through [employer] or call your pharmacy and they will forward the refill request to us. Please allow 3 business days for your refill to be completed.          Additional Information About Your Visit        MyChart Information     [employer] gives you secure access to your electronic health record. If you see a primary care provider, you can also send messages to your care team and make appointments. If you have questions, please call your primary care clinic.  If you do not have a primary care provider, please call [phone] and they will assist you.        Care EveryWhere ID     This is your Care EveryWhere ID. This could be used by other organizations to access your [facility] medical records  [id number]         Blood Pressure from Last  3 Encounters:   07/08/18 161/79   06/29/18 (!) 152/0   06/01/18 144/44    Weight from Last 3 Encounters:   07/08/18 223 lb 15.8 oz (101.6 kg)   06/01/18 232 lb 2.3 oz (105.3 kg)   01/25/18 233 lb 7.5 oz (105.9 kg)              Today, you had the following     No orders found for display         Today's Medication Changes          These changes are accurate as of 7/10/18  3:01 PM.  If you have any questions, ask your nurse or doctor.               These medicines have changed or have updated prescriptions.        Dose/Directions    hydrocortisone 1%/eucerin 1% compounded cream   This may have changed:    - when to take this  - reasons to take this   Used for:  Rash and nonspecific skin eruption        Apply topically 2 times daily   Quantity:  30 g   Refills:  1                Primary Care Provider Office Phone # Fax #    Marivel Naomi Barcenas, APRN [facility] [phone]       [address] [facility] [address] [place] [address]        Equal Access to Services     [facility]: Hadii jairo green hadasho Soomaali, waaxda luqadaha, qaybta kaalmada adeegyada, jessica aguilar . So [facility] [phone].    ATENCIÓN: Si habla español, tiene a peguero disposición servicios gratuitos de asistencia lingüística. Kishorame al [phone].    We comply with applicable federal civil rights laws and [place] laws. We do not discriminate on the basis of race, color, national origin, age, disability, sex, sexual orientation, or gender identity.            Thank you!     Thank you for choosing [facility] PRIMARY CARE  for your care. Our goal is always to provide you with excellent care. Hearing back from our patients is one way we can continue to improve our services. Please take a few minutes to complete the written survey that you may receive in the mail after your visit with us. Thank you!             Your Updated Medication List - Protect others around you: Learn how to safely use, store and throw  away your medicines at www.disposemymeds.org.          This list is accurate as of 7/10/18  3:01 PM.  Always use your most recent med list.                   Brand Name Dispense Instructions for use Diagnosis    ACCU-CHEK COMPACT PLUS test strip   Generic drug:  blood glucose monitoring     102 each    USE TO TEST BLOOD SUGAR THREE TIMES A DAY OR AS DIRECTED    Diabetic polyneuropathy associated with type 2 diabetes mellitus (H)       ACETAMINOPHEN PO      Take 325-650 mg by mouth every 4 hours as needed.        aspirin 81 MG tablet      Take 81 mg by mouth daily        baclofen 10 MG tablet    LIORESAL    90 tablet    Take 1 tablet (10 mg) by mouth 3 times daily as needed for muscle spasms        CATAPRES PO      Take 0.1 mg by mouth 2 times daily        ciprofloxacin 500 MG tablet    CIPRO    20 tablet    Take 1 tablet (500 mg) by mouth 2 times daily for 10 days    Urinary tract infection associated with indwelling urethral catheter, subsequent encounter       Cranberry 400 MG Tabs      Take 400 mg by mouth 2 times daily        cyanocobalamin 1000 MCG/ML injection    VITAMIN B12    3 mL    Inject 1 mL (1,000 mcg) into the muscle every 30 days    Iron deficiency anemia, unspecified iron deficiency anemia type       famotidine 20 MG tablet    PEPCID    60 tablet    Take 1 tablet (20 mg) by mouth 2 times daily        gabapentin 300 MG capsule    NEURONTIN    360 capsule    Take 600 mg by mouth 2 times daily    Diabetic polyneuropathy associated with type 2 diabetes mellitus (H)       hydrocortisone 1%/eucerin 1% compounded cream     30 g    Apply topically 2 times daily    Rash and nonspecific skin eruption       labetalol 300 MG tablet    NORMODYNE    120 tablet    Take 0.5 tablets (150 mg) by mouth 2 times daily Take 1/2 tablet (150mg) =300mg. Hold for systolic BP less than 120.    Benign essential hypertension, Kidney replaced by transplant       lactobacillus rhamnosus (GG) capsule     60 capsule    Take 1  capsule by mouth 2 times daily    Diarrhea       LANTUS SOLOSTAR 100 UNIT/ML injection   Generic drug:  insulin glargine     15 mL    Inject 22 Units Subcutaneous At Bedtime    Type 2 diabetes mellitus with diabetic polyneuropathy, with long-term current use of insulin (H)       levETIRAcetam 750 MG tablet    KEPPRA    60 tablet    Take 1 tablet (750 mg) by mouth 2 times daily        levothyroxine 50 MCG tablet    SYNTHROID/LEVOTHROID    90 tablet    TAKE ONE TABLET BY MOUTH EVERY DAY    Other specified hypothyroidism       lidocaine 2 % topical gel    XYLOCAINE    30 mL    Apply topically as needed for moderate pain 10 ML every 3 weeks    Johnson catheter in place       loratadine 10 MG tablet    CLARITIN    90 tablet    Take 1 tablet (10 mg) by mouth daily    Acute nasopharyngitis       NovoLOG FLEXPEN 100 UNIT/ML injection   Generic drug:  insulin aspart     15 mL    Inject Subcutaneous 3 times daily (with meals) Sliding scale    Type 2 diabetes mellitus with diabetic polyneuropathy, with long-term current use of insulin (H)       ondansetron 4 MG ODT tab    ZOFRAN ODT    20 tablet    Take 1 tablet (4 mg) by mouth daily as needed for nausea 30 minutes before getting up in your wheelchair    Vertigo       pravastatin 10 MG tablet    PRAVACHOL    90 tablet    Take 1 tablet (10 mg) by mouth daily    Hyperlipidemia LDL goal <100       predniSONE 5 MG tablet    DELTASONE    90 tablet    Take 1 tablet (5 mg) by mouth daily    Kidney replaced by transplant       simethicone 125 MG Chew chewable tablet    MYLICON    120 tablet    Take 1 tablet (125 mg) by mouth as needed for intestinal gas        TACROLIMUS PO      Take 2.5 mg by mouth 2 times daily        VITAMIN D (CHOLECALCIFEROL) PO      Take 4,000 Units by mouth 2 times daily

## 2018-07-10 NOTE — PROGRESS NOTES
COMMUNITY PARAMEDICS FOLLOW UP/ MAINTENANCE VISIT    July 10, 2018    SUBJECTIVE:                                                      Gem Jade is a 72 year old female being seen at home for a Community Paramedic Home Visit.    Patient was seen along with CP Deepti,  Keshawn and Home Care RN.    Acute concern/Follow-up issue: Pt's follow up from Hospital. Pt is doing much better being home, Per CP Deepti Pt looks a lot better from last home visit. Pt is more alert and talkative.    Home Care RN performed vitals, was doing blood draw and caring for catheter. Further appt's will be made with Pt opposite from Home RN Care. CP will contact Home RN about scheduling home visits opposite form each other.    Physical Exam and Functional Status                                                      Pt has no active c/o.     Keshawn and Pt both have concerns about the lack of PCA help; Both Pt and  request that a PCA may come to the home at least 3 times a week for personal care.    The  is concerned that due to the lack of cleaning around the catheter site that this may be contributing to the chronic UTI and hospital admissions.     PAIN MANAGEMENT                                                      No c/o of pain with CP at home.    Conclusion                                                      Next CP visit scheduled: 08/07/2018    Issues for Provider to follow up on: Potentially follow-up on recommendations for the PCA to see Pt 3x a week,    Provider follow up visit needed: NA

## 2018-07-11 ENCOUNTER — TELEPHONE (OUTPATIENT)
Dept: CARE COORDINATION | Facility: CLINIC | Age: 73
End: 2018-07-11

## 2018-07-11 DIAGNOSIS — Z94.0 KIDNEY TRANSPLANTED: Primary | ICD-10-CM

## 2018-07-11 NOTE — PROGRESS NOTES
Clinic Care Coordination Contact    Clinic Care Coordination Contact  OUTREACH    Referral Information:  Referral Source: IP Handoff         Chief Complaint   Patient presents with     Clinic Care Coordination - Post Hospital     RN        [place] Utilization: frequent hospitalizations   Clinic Utilization  Difficulty keeping appointments:: No  Utilization    Last refreshed: 7/11/2018  6:50 AM:  No Show Count (past year) 3       Last refreshed: 7/11/2018  6:50 AM:  ED visits 0       Last refreshed: 7/11/2018  6:50 AM:  Hospital admissions 7          Current as of: 7/11/2018  6:50 AM             Clinical Concerns:  Current Medical Concerns:  Frequent UTI's cause encephalopathy and AMS    Current Behavioral Concerns: none    Education Provided to patient: na   Pain  Chronic pain (GOAL):: No  Health Maintenance Reviewed: Due/Overdue   Clinical Pathway: None    Medication Management:  No concerns per patient     Functional Status:       Living Situation:       Diet/Exercise/Sleep:  Tube Feeding: No  Exercise:: Unable to exercise    Transportation:           Psychosocial:        Financial/Insurance:      Not reviewed     Resources and Interventions:  Current Resources:    ;   Home care nursing                     Goals:   Goals        General    Medical (pt-stated)     Notes - Note created  7/11/2018  3:20 PM by Kassandra Khalil, RN    #1-Goal Statement: I will reduce the number of UTI's I experience  Measure of Success: reduction in number of UTI's  Supportive Steps to Achieve: home care nurse to request orders for more frequent catheter changes and increased pericare per week  Barriers: resistant UTI's  Strengths: assistance as above  Date to Achieve By: 8-15-18                Patient/Caregiver understanding: good       Future Appointments              In 6 days Marivel Barcenas APRN CNP [facility] Integrated Primary Care, RJ    In 3 weeks 2, Rj [facility] Paramedic [facility]  Primary Care, [facility]          Plan: Called patient. Doing better since discharge. Clinic paramedics had visit yesterday. Had leaking from catheter last night after day home care nurse left the home and had to have the on call nurse come to change the catheter. PCP asks staff to arrange a phone visit for hospital follow up this. Will ask TC to assist. Patient agrees this will be helpful. Called home care nurse who knows patient well. Since she is still experiencing frequent UTI's she is going to ask for orders from PCP for catheter change frequency from every 3 weeks to every 2 weeks, increase baths to 3 times per week for better pericare and also changing catheter flushes from PRN to 1 time per week with nurse to instruct patient's  on how to provide these. Order request sent to PCP in tele encounter. Will follow up with PCP after phone visit to re-assess needs. Care coordination contact information and access plan mailed to home.    Porsha Khalil R.N.  Clinic Care Coordinator  [facility]  [phone]

## 2018-07-11 NOTE — TELEPHONE ENCOUNTER
Spoke to patient regarding tac level = 8.4, above goal.  Patient states that she will have her  call txp center back with current tac dose.

## 2018-07-11 NOTE — TELEPHONE ENCOUNTER
Patient returned call and confirmed current tac dose of 2.5 mg BID and confirmed good 12 hour trough level.  Left message for patient and Instructed patient decrease Tac dose to 2 mg BID and repeat labs in 1-2 weeks.

## 2018-07-11 NOTE — TELEPHONE ENCOUNTER
Provided RN CC introduction to home care nurse, Yashira. Yashira knows patient very well. Due to frequent UTI's she is requesting orders for orders from PCP for catheter change frequency from every 3 weeks to every 2 weeks, increase baths to 3 times per week for better pericare and also changing catheter flushes from PRN to 1 time per week with nurse to instruct patient's  on how to provide these. Order request sent to PCP for decision.     Porsha Khalil R.N.  Clinic Care Coordinator  [facility] Primary [facility]  [phone]

## 2018-07-11 NOTE — TELEPHONE ENCOUNTER
Tacrolimus = 8.4  Goal 4-6    PLAN:  Call  and confirm result is a good 12 hr level.  If confirmed, recommend decreasing Tac dose from 2.5 mg BID to 2 mg BID.  Recheck Tac levels in 1 - 2 weeks post dose change.

## 2018-07-12 RX ORDER — TACROLIMUS 1 MG/1
2 CAPSULE ORAL 2 TIMES DAILY
Qty: 120 CAPSULE | Refills: 11 | OUTPATIENT
Start: 2018-07-12

## 2018-07-12 NOTE — TELEPHONE ENCOUNTER
Ok for orders request, please let the [facility] RN know     Thanks Klaudia Barcenas [facility]

## 2018-07-12 NOTE — TELEPHONE ENCOUNTER
Writer spoke with HC nurse, verbal ok given for increase in frequency of Cathter changes, baths and flushes.     Thanks! Shayna Licona RN

## 2018-07-13 ENCOUNTER — TELEPHONE (OUTPATIENT)
Dept: FAMILY MEDICINE | Facility: CLINIC | Age: 73
End: 2018-07-13

## 2018-07-13 DIAGNOSIS — Z94.0 KIDNEY REPLACED BY TRANSPLANT: Primary | ICD-10-CM

## 2018-07-13 RX ORDER — TACROLIMUS 1 MG/1
2 CAPSULE ORAL 2 TIMES DAILY
Qty: 120 CAPSULE | Refills: 3 | Status: SHIPPED | OUTPATIENT
Start: 2018-07-13 | End: 2018-07-30

## 2018-07-13 NOTE — TELEPHONE ENCOUNTER
Tacrolimus dose changed to 2mg 2x daily and reordered, sent to  [facility] pharmacy, please let her [facility] nurse know of med change and let the patient know I have reordered it     Klaudia MTZ

## 2018-07-16 NOTE — TELEPHONE ENCOUNTER
Klaudia,   HC nurse, Yashira is seeing patient on Tuesdays. HC nurse asking if and when you may want labs drawn for Tacrolimus level?  Please advise.     Thanks! Shayna Licona RN

## 2018-07-16 NOTE — TELEPHONE ENCOUNTER
Klaudia has a staff message that the patient is aware that she needs a tacrolimus level checked 1-2 weeks after her dose change.   ANTHONY Ortega CNP

## 2018-07-16 NOTE — TELEPHONE ENCOUNTER
Yashira  nurse updated to when lab will be needed for Tacro level. See message below.    Thanks! Shayna Licona RN

## 2018-07-17 ENCOUNTER — VIRTUAL VISIT (OUTPATIENT)
Dept: FAMILY MEDICINE | Facility: CLINIC | Age: 73
End: 2018-07-17
Payer: COMMERCIAL

## 2018-07-17 DIAGNOSIS — L89.159 DECUBITUS ULCER OF SACRAL REGION, UNSPECIFIED ULCER STAGE: ICD-10-CM

## 2018-07-17 DIAGNOSIS — E11.42 DIABETIC POLYNEUROPATHY ASSOCIATED WITH TYPE 2 DIABETES MELLITUS (H): ICD-10-CM

## 2018-07-17 DIAGNOSIS — B49 FUNGAL INFECTION: ICD-10-CM

## 2018-07-17 DIAGNOSIS — Z09 HOSPITAL DISCHARGE FOLLOW-UP: Primary | ICD-10-CM

## 2018-07-17 PROCEDURE — 98968 PH1 ASSMT&MGMT NQHP 21-30: CPT | Performed by: NURSE PRACTITIONER

## 2018-07-17 RX ORDER — GABAPENTIN 300 MG/1
600 CAPSULE ORAL 2 TIMES DAILY
Qty: 360 CAPSULE | Refills: 3 | Status: SHIPPED | OUTPATIENT
Start: 2018-07-17 | End: 2019-07-15

## 2018-07-17 NOTE — MR AVS SNAPSHOT
After Visit Summary   7/17/2018    Gem Jade    MRN: [id number]           Patient Information     Date Of Birth          1945        Visit Information        Provider Department      7/17/2018 2:30 PM Marivel Barcenas APRN CNP [facility]        Today's Diagnoses     Hospital discharge follow-up    -  1    Diabetic polyneuropathy associated with type 2 diabetes mellitus (H)        Fungal infection        Decubitus ulcer of sacral region, unspecified ulcer stage          Care Instructions    We will have Conchita call you about your sugars and getting a new meter    We will increase your Gabapentin so you can take an extra one at bedtime     I will call your OT to get your transfer work restarted and get you a gel cushion     No other med changes at this time     We will try and order you some tegaderm pads     We have ordered you some antifungal for your rash    I will call you in one month           Follow-ups after your visit        Your next 10 appointments already scheduled     Aug 07, 2018 12:00 PM CDT   Community Paramedic with [facility] Community Paramedic 2   [facility] ([facility])    [address] [place] [address]  [address]  [facility] [address] [phone]              Who to contact     If you have questions or need follow up information about today's clinic visit or your schedule please contact [facility] directly at [phone].  Normal or non-critical lab and imaging results will be communicated to you by MyChart, letter or phone within 4 business days after the clinic has received the results. If you do not hear from us within 7 days, please contact the clinic through MyChart or phone. If you have a critical or abnormal lab result, we will notify you by phone as soon as possible.  Submit refill requests through [email or web address] or call your pharmacy and they will forward  the refill request to us. Please allow 3 business days for your refill to be completed.          Additional Information About Your Visit        [facility] Information     [employer] gives you secure access to your electronic health record. If you see a primary care provider, you can also send messages to your care team and make appointments. If you have questions, please call your primary care clinic.  If you do not have a primary care provider, please call [phone] and they will assist you.        Care EveryWhere ID     This is your Care EveryWhere ID. This could be used by other organizations to access your [place] medical records  [id number]         Blood Pressure from Last 3 Encounters:   07/08/18 161/79   06/29/18 (!) 152/0   06/01/18 144/44    Weight from Last 3 Encounters:   07/08/18 223 lb 15.8 oz (101.6 kg)   06/01/18 232 lb 2.3 oz (105.3 kg)   01/25/18 233 lb 7.5 oz (105.9 kg)              Today, you had the following     No orders found for display         Today's Medication Changes          These changes are accurate as of 7/17/18 11:59 PM.  If you have any questions, ask your nurse or doctor.               Start taking these medicines.        Dose/Directions    Miconazole Nitrate 2 % Aero   Used for:  Fungal infection   Started by:  Marivel Barcenas APRN CNP        Dose:  1 Dose   Externally apply 1 Dose topically 3 times daily   Quantity:  1 Bottle   Refills:  3       order for DME   Used for:  Decubitus ulcer of sacral region, unspecified ulcer stage   Started by:  Marivel Barcenas APRN CNP        Equipment being ordered: Tegaderm 4x4  MICHELLE 6 months Sacral wound   Quantity:  1 Box   Refills:  3         These medicines have changed or have updated prescriptions.        Dose/Directions    gabapentin 300 MG capsule   Commonly known as:  NEURONTIN   This may have changed:  additional instructions   Used for:  Diabetic polyneuropathy associated with type 2 diabetes mellitus (H)    Changed by:  Marivel Barcenas APRN CNP        Dose:  600 mg   Take 2 capsules (600 mg) by mouth 2 times daily May take an additional 300mg at bedtime daily with increase pain   Quantity:  360 capsule   Refills:  3       hydrocortisone 1%/eucerin 1% compounded cream   This may have changed:    - when to take this  - reasons to take this   Used for:  Rash and nonspecific skin eruption        Apply topically 2 times daily   Quantity:  30 g   Refills:  1            Where to get your medicines      These medications were sent to [facility] - [place], [place] - [address]  [address] [facility] [address]    Hours:  Mon-Fri 8:30am-5:00pm Toll Free [phone] Phone:  [phone]     gabapentin 300 MG capsule    Miconazole Nitrate 2 % Aero         Some of these will need a paper prescription and others can be bought over the counter.  Ask your nurse if you have questions.     Bring a paper prescription for each of these medications     order for DME                Primary Care Provider Office Phone # Fax #    ANTHONY Antonio [phone]       [address] [facility] [address]  [facility] [address]        Goals        General    Medical (pt-stated)     Notes - Note created  7/11/2018  3:20 PM by Kassandra Khalil, RN    #1-Goal Statement: I will reduce the number of UTI's I experience  Measure of Success: reduction in number of UTI's  Supportive Steps to Achieve: home care nurse to request orders for more frequent catheter changes and increased pericare per week  Barriers: resistant UTI's  Strengths: assistance as above  Date to Achieve By: 8-15-18          Equal Access to Services     [facility]: Marques Goodman, madhav dai, marisela deanaljessica cole. So [facility] [phone].    ATENCIÓN: Si habla español, tiene a peguero disposición servicios gratuitos de asistencia lingüística.  Carmine gambino [phone].    We comply with applicable federal civil rights laws and [place] laws. We do not discriminate on the basis of race, color, national origin, age, disability, sex, sexual orientation, or gender identity.            Thank you!     Thank you for choosing [facility]  for your care. Our goal is always to provide you with excellent care. Hearing back from our patients is one way we can continue to improve our services. Please take a few minutes to complete the written survey that you may receive in the mail after your visit with us. Thank you!             Your Updated Medication List - Protect others around you: Learn how to safely use, store and throw away your medicines at www.disposemymeds.org.          This list is accurate as of 7/17/18 11:59 PM.  Always use your most recent med list.                   Brand Name Dispense Instructions for use Diagnosis    ACCU-CHEK COMPACT PLUS test strip   Generic drug:  blood glucose monitoring     102 each    USE TO TEST BLOOD SUGAR THREE TIMES A DAY OR AS DIRECTED    Diabetic polyneuropathy associated with type 2 diabetes mellitus (H)       ACETAMINOPHEN PO      Take 325-650 mg by mouth every 4 hours as needed.        aspirin 81 MG tablet      Take 81 mg by mouth daily        baclofen 10 MG tablet    LIORESAL    90 tablet    Take 1 tablet (10 mg) by mouth 3 times daily as needed for muscle spasms        CATAPRES PO      Take 0.1 mg by mouth 2 times daily        ciprofloxacin 500 MG tablet    CIPRO    20 tablet    Take 1 tablet (500 mg) by mouth 2 times daily for 10 days    Urinary tract infection associated with indwelling urethral catheter, subsequent encounter       Cranberry 400 MG Tabs      Take 400 mg by mouth 2 times daily        cyanocobalamin 1000 MCG/ML injection    VITAMIN B12    3 mL    Inject 1 mL (1,000 mcg) into the muscle every 30 days    Iron deficiency anemia, unspecified iron deficiency anemia type        famotidine 20 MG tablet    PEPCID    60 tablet    Take 1 tablet (20 mg) by mouth 2 times daily        gabapentin 300 MG capsule    NEURONTIN    360 capsule    Take 2 capsules (600 mg) by mouth 2 times daily May take an additional 300mg at bedtime daily with increase pain    Diabetic polyneuropathy associated with type 2 diabetes mellitus (H)       hydrocortisone 1%/eucerin 1% compounded cream     30 g    Apply topically 2 times daily    Rash and nonspecific skin eruption       labetalol 300 MG tablet    NORMODYNE    120 tablet    Take 0.5 tablets (150 mg) by mouth 2 times daily Take 1/2 tablet (150mg) =300mg. Hold for systolic BP less than 120.    Benign essential hypertension, Kidney replaced by transplant       lactobacillus rhamnosus (GG) capsule     60 capsule    Take 1 capsule by mouth 2 times daily    Diarrhea       LANTUS SOLOSTAR 100 UNIT/ML injection   Generic drug:  insulin glargine     15 mL    Inject 22 Units Subcutaneous At Bedtime    Type 2 diabetes mellitus with diabetic polyneuropathy, with long-term current use of insulin (H)       levETIRAcetam 750 MG tablet    KEPPRA    60 tablet    Take 1 tablet (750 mg) by mouth 2 times daily        levothyroxine 50 MCG tablet    SYNTHROID/LEVOTHROID    90 tablet    TAKE ONE TABLET BY MOUTH EVERY DAY    Other specified hypothyroidism       lidocaine 2 % topical gel    XYLOCAINE    30 mL    Apply topically as needed for moderate pain 10 ML every 3 weeks    Johnson catheter in place       loratadine 10 MG tablet    CLARITIN    90 tablet    Take 1 tablet (10 mg) by mouth daily    Acute nasopharyngitis       Miconazole Nitrate 2 % Aero     1 Bottle    Externally apply 1 Dose topically 3 times daily    Fungal infection       NovoLOG FLEXPEN 100 UNIT/ML injection   Generic drug:  insulin aspart     15 mL    Inject Subcutaneous 3 times daily (with meals) Sliding scale    Type 2 diabetes mellitus with diabetic polyneuropathy, with long-term current use of insulin (H)        ondansetron 4 MG ODT tab    ZOFRAN ODT    20 tablet    Take 1 tablet (4 mg) by mouth daily as needed for nausea 30 minutes before getting up in your wheelchair    Vertigo       order for DME     1 Box    Equipment being ordered: Tegaderm 4x4  MICHELLE 6 months Sacral wound    Decubitus ulcer of sacral region, unspecified ulcer stage       pravastatin 10 MG tablet    PRAVACHOL    90 tablet    Take 1 tablet (10 mg) by mouth daily    Hyperlipidemia LDL goal <100       predniSONE 5 MG tablet    DELTASONE    90 tablet    Take 1 tablet (5 mg) by mouth daily    Kidney replaced by transplant       simethicone 125 MG Chew chewable tablet    MYLICON    120 tablet    Take 1 tablet (125 mg) by mouth as needed for intestinal gas        tacrolimus 1 MG capsule    GENERIC EQUIVALENT    120 capsule    Take 2 capsules (2 mg) by mouth 2 times daily    Kidney replaced by transplant       VITAMIN D (CHOLECALCIFEROL) PO      Take 4,000 Units by mouth 2 times daily

## 2018-07-17 NOTE — Clinical Note
Conchita can you please call her about her insulin, sugars are high and her meter is not working.  I will add you to my next phone visit  Phone visit in one month, with Conchita

## 2018-07-17 NOTE — PROGRESS NOTES
"Gem Jade is a 72 year old female who is being evaluated via a billable telephone visit.      The patient has been notified of following:     \"This telephone visit will be conducted via a call between you and your physician/provider. We have found that certain health care needs can be provided without the need for a physical exam.  This service lets us provide the care you need with a short phone conversation.  If a prescription is necessary we can send it directly to your pharmacy.  If lab work is needed we can place an order for that and you can then stop by our lab to have the test done at a later time.    We will bill your insurance company for this service.  Please check with your medical insurance if this type of visit is covered. You may be responsible for the cost of this type of visit if insurance coverage is denied.  The typical cost is $30 (10min), $59 (11-20min) and $85 (21-30min).  Most often these visits are shorter than 10 minutes.    If during the course of the call the physician/provider feels a telephone visit is not appropriate, you will not be charged for this service.\"       Consent has been obtained for this service by care team member: yes. See the scanned image in the medical record.  SUBJECTIVE:     Gem Jade complains of    Chief Complaint   Patient presents with     RECHECK       I have reviewed and updated the patient's Past Medical History, Social History, Family History and Medication List.    ALLERGIES  Bactrim ds [sulfamethoxazole w/trimethoprim]; Atorvastatin calcium; Codeine; Darvocet [propoxyphene n-apap]; Hydromorphone; Levofloxacin; Morphine; Niaspan [niacin]; Percocet [oxycodone-acetaminophen]; and Vicodin [hydrocodone-acetaminophen]    Thalia Cazares MA    Additional provider notes:       Hospital Follow-up Visit:    Hospital/Nursing Home/IP Rehab Facility: [facility]   Date of Admission: 7/5/18  Date of Discharge: 7/8/18  Reason(s) for " Admission: UTI            Problems taking medications regularly:  None,  setting up meds again       Medication changes since discharge: None, wishes to increase her gabapentin, never held her Clonidine, BP at baseline and discussed continuing her clonidine        Problems adhering to non-medication therapy:  None    Summary of hospitalization:  [facility] discharge summary reviewed  Diagnostic Tests/Treatments reviewed.  Follow up needed: none  Other Healthcare Providers Involved in Patient s Care:         Homecare  Update since discharge: improved. New on sacrum, red rash on buttock, itchy,  will be restarting her OT transfers, needs pressure relieving cushion, requesting Tegaderm pads for her wound 4x5, changing every other day, increase in foot pain, had  the gabapentin held for a period of time in the hospital, restarted it at full strength when home but not working as well, had to take an extra Gabapentin and it helped, changing the cath every 2 weeks now due to leaking and extra cleaning,  Sugars have been high since coming home, working on diet, would like to talk to PharmD and needs a new meter      Post Discharge Medication Reconciliation: discharge medications reconciled, continue medications without change. Except increase in Gabapentin and continuing Clonidine   Plan of care communicated with patient and caregiver     Coding guidelines for this visit:  Type of Medical   Decision Making Face-to-Face Visit       within 7 Days of discharge Face-to-Face Visit        within 14 days of discharge   Moderate Complexity 56902 [id number]   High Complexity 50263 55060        [facility]  Discharge Summary      Gem Jade MRN# [id number]   YOB: 1945 Age: 72 year old      Date of Admission:                                         7/5/2018  Date of Discharge:                                          7/8/2018  Admitting Physician:                                            Gregg Knott MD  Discharge Physician:                   Mark Anthony Rutherford MD  Discharging Service:                       Hospitalist      Primary Provider: Marivel Barcenas  Primary Care Physician Phone Number: [phone]          Discharge Diagnoses/Problem Oriented Hospital Course (Providers):    Gem Jade was admitted on 7/5/2018 by Gregg Knott MD and I would refer you to their history and physical.  The following problems were addressed during her hospitalization:  Please refer to the H&P , Nephrology and Infectious disease notes for the details of this presentation, In brief,This is a 72-y/o woman with PMH significant for  ESRD s/p renal transplant in 1999 on chronic immunosuppression with prednisone and tacrolimus, chronic indwelling Johnson with frequent urinary tract infections including citrobacter, pansensitive E. coli and VRE in the past, history of seizures and history of altered mental status when presenting with a urinary tract infection who presents to [facility] with encephalopathy though to be secondary yo urinary tract infection.   Following is a list of major problems during this stay;   Acute encephalopathy   catheter associated urinary tract infection  Sepsis of urinary source    History MDR   Encephalopathy resolved,     culture grew Citrobacter, Initially treated with Intravenous Abx( Cefepime) and discharged with oral ciprofloxacin for 10 days per ID recommendation.      Diabetes mellitus.  managed with Insulin.       Hypertension. Hold off on clonidine at present time.  Labetalol scheduled with hold parameters.      Seizures.  Keppra continued .       History of renal transplant.     - -  Nephrology followed during he stay, no change to  immunosuppressant therapy.          Hypothyroidism.  Resume levothyroxine at present time.       Dermatitis.  Suspect this is candidal dermatitis.  Continue with Miconazole powder at present time, but  may need Diflucan the future              OBJECTIVE:     (Z09) Hospital discharge follow-up  (primary encounter diagnosis)  Comment: improved, no sx of infection   Plan: Community Paramedic to visit monthly     (E11.42) Diabetic polyneuropathy associated with type 2 diabetes mellitus (H)  Comment: worsened   Plan: gabapentin (NEURONTIN) 300 MG capsule        Will increase gabapentin so you can take one extra tab at bedtime as needed for increase pain     (B49) Fungal infection  Comment: rash on buttock, itchy  Plan: Miconazole Nitrate 2 % AERO        Will start and antifungal     (L89.159) Decubitus ulcer of sacral region, unspecified ulcer stage  Comment: sacral area, appears to be a surface wound   Plan: order for DME        Tegaderm ordered and will call OT to discuss request for a gel cushion for her WC     Patient Instructions   We will have Conchita call you about your sugars and getting a new meter    We will increase your Gabapentin so you can take an extra one at bedtime     I will call your OT to get your transfer work restarted and get you a gel cushion     No other med changes at this time     We will try and order you some tegaderm pads     We have ordered you some antifungal for your rash    I will call you in one month       I have reviewed the note as documented above.  This accurately captures the substance of my conversation with the patient,  Klaudia MTZ      Total time of call between patient and provider was 30 minutes     Thalia Cazares

## 2018-07-19 NOTE — PATIENT INSTRUCTIONS
We will have Conchita call you about your sugars and getting a new meter    We will increase your Gabapentin so you can take an extra one at bedtime     I will call your OT to get your transfer work restarted and get you a gel cushion     No other med changes at this time     We will try and order you some tegaderm pads     We have ordered you some antifungal for your rash    I will call you in one month

## 2018-07-20 ENCOUNTER — TELEPHONE (OUTPATIENT)
Dept: PHARMACY | Facility: CLINIC | Age: 73
End: 2018-07-20

## 2018-07-20 DIAGNOSIS — E11.42 DIABETIC POLYNEUROPATHY ASSOCIATED WITH TYPE 2 DIABETES MELLITUS (H): Primary | ICD-10-CM

## 2018-07-20 RX ORDER — BLOOD-GLUCOSE METER
1 EACH MISCELLANEOUS ONCE
Qty: 1 KIT | Refills: 0 | Status: SHIPPED | OUTPATIENT
Start: 2018-07-20 | End: 2019-02-12

## 2018-07-20 RX ORDER — LANCETS
EACH MISCELLANEOUS
Qty: 100 EACH | Refills: 11 | Status: SHIPPED | OUTPATIENT
Start: 2018-07-20 | End: 2018-08-07

## 2018-07-20 NOTE — TELEPHONE ENCOUNTER
Called patient to discuss blood sugars and glucose monitor per request of Dr. Marivel Barcenas.  Pt doesn't think her meter is working so has been using her 's.  She was not really clear on the phone when I asked what her readings have been, initially she said in the 200s and then said she has not been able to check them.  She is very interested in getting a sensor meter if able.       Pt states she has been taking her insulin as prescribed, Lantus 22 units daily and Novolog per sliding scale that her husbands helps her with before meals.  She has not been eating much since being home.      A/P:  1.  Spoke to the rep at [employer] today about getting coverage for the Volodymyr, the process for getting it covered under Part B takes up to 1.5 weeks minimally.  I also have some concerns about educating the patient on the new meter if she is not able to come in to the clinic.  Will wait until Conchita returns to determine if this is the right direction for the patient since she has been working with her for some time.  Today I sent prescription for a new AccuCheck meter to  so she has her own to use.    2.  Pt will check her blood sugars with her 's meter until she can get a new one.  Will have Conchita reach out to her next week to review and adjust insulin if needed.  Offered a number of times to help her adjust her insulin today but patient would prefer to wait.    Deepti Marte , Pharm D  [phone] (phone)  [phone] (pager)  Medication Therapy Management Pharmacist

## 2018-07-24 LAB
TACROLIMUS BLD-MCNC: 5.2 UG/L (ref 5–15)
TME LAST DOSE: NORMAL H

## 2018-07-24 PROCEDURE — 80197 ASSAY OF TACROLIMUS: CPT | Performed by: INTERNAL MEDICINE

## 2018-07-25 ENCOUNTER — ALLIED HEALTH/NURSE VISIT (OUTPATIENT)
Dept: PHARMACY | Facility: CLINIC | Age: 73
End: 2018-07-25
Payer: COMMERCIAL

## 2018-07-25 DIAGNOSIS — E11.42 DIABETIC POLYNEUROPATHY ASSOCIATED WITH TYPE 2 DIABETES MELLITUS (H): ICD-10-CM

## 2018-07-25 DIAGNOSIS — Z94.0 KIDNEY REPLACED BY TRANSPLANT: ICD-10-CM

## 2018-07-25 DIAGNOSIS — Z79.60 LONG-TERM USE OF IMMUNOSUPPRESSANT MEDICATION: ICD-10-CM

## 2018-07-25 DIAGNOSIS — E11.3299 DIABETES MELLITUS WITH BACKGROUND RETINOPATHY (H): Primary | ICD-10-CM

## 2018-07-25 DIAGNOSIS — Z94.0 KIDNEY TRANSPLANT RECIPIENT: ICD-10-CM

## 2018-07-25 DIAGNOSIS — I10 BENIGN ESSENTIAL HYPERTENSION: ICD-10-CM

## 2018-07-25 PROCEDURE — 99606 MTMS BY PHARM EST 15 MIN: CPT | Performed by: PHARMACIST

## 2018-07-25 PROCEDURE — 99607 MTMS BY PHARM ADDL 15 MIN: CPT | Performed by: PHARMACIST

## 2018-07-25 NOTE — PROGRESS NOTES
SUBJECTIVE/OBJECTIVE:                Gem Jade is a 72 year old female called for a follow-up visit for Medication Therapy Management.  She was referred to me from Marivel Barcenas.     Chief Complaint: Follow up from our visit on 6-6-18. Blood pressures are running high. Would like more info on Freestyle Volodymyr that she has seen on TV.    Tobacco: History of tobacco dependence- quit  Alcohol: not currently using    Medication Adherence/Access:  Patient has assistance with medication administration and setting up boxes (HCRN). They come three times weekly.   states there were no med changes upon hospital discharge except the addition of an antibiotic.     Diabetes:  Pt currently taking lantus 22u subQ daily and novolog 10 units with lunch if having waffles (if having cottage cheese-no dose), 2-8 units sliding scale with dinner. If patient has a meal with carbs, then they do an additional 10 units of novolog. Pt is not experiencing side effects.  SMBG: four times daily.   Ranges (patient reported): in the 200s, especially while in the hospital. Has been in the 400s occasionally.  Date FBG/ 2hours post Lunch/2hours post Dinner /2hours post    7/25 166,137      7/24 137      7/23 185      7/22 245  81    7/21 282      7/20    123   7/19 288   181   Recent readings of 379 and 465 mid-morning. Patient's  believes blood sugars are improving.   Patient is not experiencing hypoglycemia  Recent symptoms of high blood sugar? none  Eye exam: due  Foot exam: due  ACEi/ARB: No  Urine Albumin:   Lab Results   Component Value Date    UMALCR 6776.56 (H) 04/26/2017      Aspirin: Taking 81mg daily and denies side effects  Diet/Exercise: No longer eating Ramen. Reports eating a lot of rice. Eats a lot of fish, not much meat. Patient is not getting up in a wheelchair yet. Needs to follow up with OT.    Hemoglobin A1C   Date Value Ref Range Status   05/26/2018 8.8 (H) 0 - 5.6 % Final     Comment:     Normal <5.7%  Prediabetes 5.7-6.4%  Diabetes 6.5% or higher - adopted from ADA   consensus guidelines.         S/p Kidney Transplant: Currently taking tacrolimus 2.5 mg twice daily (never received message from transplant team to switch her dose) and prednisone 5mg every day. Denies SE.     Neuropathy: Patient taking gabapentin 300mg capsules- 2 capsules twice daily. Working well and no reported side effects. Was previously taking an extra capsule PRN to better control pain but, reports not having to do this anymore.     Today's Vitals: There were no vitals taken for this visit.-phone visit      ASSESSMENT:              Current medications were reviewed today as discussed above.      Medication Adherence: good, no issues identified     Diabetes: Improved. Patient not at A1c goal of <8%. Patient might not qualify for Freestyle Volodymyr. Medicare part B requires QID BS testing (pt tests typically 1-2 times daily) and documentation of 6 month face-to-face with provider, which pt is currently unable to complete. Will send in Rx for Freestyle Volodymyr to see if there is any coverage with [employer] part D. Clarified Novolog sliding scale with patient as follows:  140-239=2 units  240-349=4 units  350-439=6 units  440=8 units    S/p Kidney Transplant: Improved without tac dose change. No dose adjustments, d/w PCP and verbal ok. Will inform transplant team of current tac dose.    Neuropathy: Stable.       PLAN:                  1. Will send in Rx for Freestyle Volodymyr to see how much Part D [employer] will cover.     2. Will inform transplant team of current tac dose 2.5mg BID - sent Epic message to Ross Elizabeth nephrologist    I spent 30 minutes with this patient today. All changes were made via collaborative practice agreement with Marivel Barcenas. A copy of the visit note was provided to the patient's primary care provider.     Will follow up in 8-21-18 at 1:30pm.    The patient declined a summary of these recommendations as an after  visit summary.    Ellen Bentley, PharmD Candidate    Conchita Toledo, PharmD, BCACP

## 2018-07-25 NOTE — MR AVS SNAPSHOT
After Visit Summary   7/25/2018    Gem Jade    MRN: [id number]           Patient Information     Date Of Birth          1945        Visit Information        Provider Department      7/25/2018 3:30 PM Conchita Tol[facility]        Today's Diagnoses     Diabetes mellitus with background retinopathy    -  1    Benign essential hypertension        Kidney replaced by transplant        Diabetic polyneuropathy associated with type 2 diabetes mellitus (H)        Kidney transplant recipient        Long-term use of immunosuppressant medication           Follow-ups after your visit        Your next 10 appointments already scheduled     Aug 07, 2018 12:00 PM CDT   Community Paramedic with Psychiatric hospital Paramedic 1, Psychiatric hospital Paramedic 2   [facility] ([facility]    [address]  [address] [place] [id number] [phone]            Aug 21, 2018  1:30 PM CDT   TELEMEDICINE with Conchita Tol[facility] ([facility])    [address] [place] [address]  [address] [place] [id number] [phone]           Note: this is not an onsite visit; there is no need to come to the facility.            Aug 27, 2018  2:30 PM CDT   Telephone Visit with ANTHONY [place] [facility] [place]    [address]   [address] [place] [address] [phone]           Note: this is not an onsite visit; there is no need to come to the facility.              Who to contact     If you have questions or need follow up information about today's clinic visit or your schedule please contact [facility] directly at [phone].  Normal or non-critical lab and imaging results will be communicated to you by  MyChart, letter or phone within 4 business days after the clinic has received the results. If you do not hear from us within 7 days, please contact the clinic through [email or web address] or phone. If you have a critical or abnormal lab result, we will notify you by phone as soon as possible.  Submit refill requests through [email or web address] or call your pharmacy and they will forward the refill request to us. Please allow 3 business days for your refill to be completed.          Additional Information About Your Visit        [email or web address] Information     [email or web address] gives you secure access to your electronic health record. If you see a primary care provider, you can also send messages to your care team and make appointments. If you have questions, please call your primary care clinic.  If you do not have a primary care provider, please call [phone] and they will assist you.        Care EveryWhere ID     This is your Care EveryWhere ID. This could be used by other organizations to access your [place] medical records  [id number]         Blood Pressure from Last 3 Encounters:   07/08/18 161/79   06/29/18 (!) 152/0   06/01/18 144/44    Weight from Last 3 Encounters:   07/08/18 223 lb 15.8 oz (101.6 kg)   06/01/18 232 lb 2.3 oz (105.3 kg)   01/25/18 233 lb 7.5 oz (105.9 kg)              Today, you had the following     No orders found for display         Today's Medication Changes          These changes are accurate as of 7/25/18 11:59 PM.  If you have any questions, ask your nurse or doctor.               Start taking these medicines.        Dose/Directions    continuous blood glucose monitoring sensor   Used for:  Diabetes mellitus with background retinopathy (H)        For use with Freestyle Volodymyr Flash  for continuous monitioring of blood glucose levels. Replace sensor every 10 days.   Quantity:  9 each   Refills:  3       FREESTYLE VOLOYDMYR READER Ayah   Used for:  Diabetes mellitus with background retinopathy (H)        Dose:  1  Device   1 Device 4 times daily   Quantity:  1 Device   Refills:  0         These medicines have changed or have updated prescriptions.        Dose/Directions    hydrocortisone 1%/eucerin 1% compounded cream   This may have changed:    - when to take this  - reasons to take this   Used for:  Rash and nonspecific skin eruption        Apply topically 2 times daily   Quantity:  30 g   Refills:  1       * tacrolimus 1 MG capsule   Commonly known as:  GENERIC EQUIVALENT   This may have changed:  additional instructions   Used for:  Kidney replaced by transplant        Dose:  2 mg   Take 2 capsules (2 mg) by mouth 2 times daily Take with 0.5mg caps for total dose of 2.5mg BID   Quantity:  120 capsule   Refills:  3       * tacrolimus 0.5 MG capsule   Commonly known as:  GENERIC EQUIVALENT   This may have changed:  Another medication with the same name was changed. Make sure you understand how and when to take each.        Dose:  0.5 mg   Take 1 capsule (0.5 mg) by mouth 2 times daily Take with 1mg caps for total dose of 2.5mg BID   Refills:  0       * Notice:  This list has 2 medication(s) that are the same as other medications prescribed for you. Read the directions carefully, and ask your doctor or other care provider to review them with you.         Where to get your medicines      These medications were sent to [facility] - [place], [place] - [address] [place] [address]  [address] [facility] [address]    Hours:  Mon-Fri 8:30am-5:00pm Toll Free [phone] Phone:  [phone]     continuous blood glucose monitoring sensor    FREESTYLE HEIDI READER Ayah                Primary Care Provider Office Phone # Fax #    ANTHONY Antonio [facility] [phone]       [address] [place] [address] [address]  [facility] [address]        Goals        General    Medical (pt-stated)     Notes - Note created  7/11/2018  3:20 PM by Kassandra Khalil, RN    #1-Goal Statement: I will reduce the  number of UTI's I experience  Measure of Success: reduction in number of UTI's  Supportive Steps to Achieve: home care nurse to request orders for more frequent catheter changes and increased pericare per week  Barriers: resistant UTI's  Strengths: assistance as above  Date to Achieve By: 8-15-18          Equal Access to Services     CARLOS ENRIQUE GREEN : Hadii aad ku hadchristinajavier Sokaleigh, waaxda luqadaha, qaybta kaalmada mikeradhajorje, jessica patelhugocarlitos roman. So [facility] [phone].    ATENCIÓN: Si habla español, tiene a peguero disposición servicios gratuitos de asistencia lingüística. Llame al [phone].    We comply with applicable federal civil rights laws and [place] laws. We do not discriminate on the basis of race, color, national origin, age, disability, sex, sexual orientation, or gender identity.            Thank you!     Thank you for choosing [facility]  for your care. Our goal is always to provide you with excellent care. Hearing back from our patients is one way we can continue to improve our services. Please take a few minutes to complete the written survey that you may receive in the mail after your visit with us. Thank you!             Your Updated Medication List - Protect others around you: Learn how to safely use, store and throw away your medicines at www.disposemymeds.org.          This list is accurate as of 7/25/18 11:59 PM.  Always use your most recent med list.                   Brand Name Dispense Instructions for use Diagnosis    * ACCU-CHEK COMPACT PLUS test strip   Generic drug:  blood glucose monitoring     102 each    USE TO TEST BLOOD SUGAR THREE TIMES A DAY OR AS DIRECTED    Diabetic polyneuropathy associated with type 2 diabetes mellitus (H)       * blood glucose monitoring test strip    ACCU-CHEK GUIDE    100 strip    Use to test blood sugar 4-5 times daily or as directed.  Ok to dispense alternative if insurance prefers.    Diabetic  polyneuropathy associated with type 2 diabetes mellitus (H)       ACETAMINOPHEN PO      Take 325-650 mg by mouth every 4 hours as needed.        aspirin 81 MG tablet      Take 81 mg by mouth daily        baclofen 10 MG tablet    LIORESAL    90 tablet    Take 1 tablet (10 mg) by mouth 3 times daily as needed for muscle spasms        blood glucose monitoring lancets     100 each    Use to test blood sugar 4-5 times daily or as directed.  Ok to dispense alternative if insurance prefers    Diabetic polyneuropathy associated with type 2 diabetes mellitus (H)       CATAPRES PO      Take 0.1 mg by mouth 2 times daily        continuous blood glucose monitoring sensor     9 each    For use with Freestyle Volodymyr Flash  for continuous monitioring of blood glucose levels. Replace sensor every 10 days.    Diabetes mellitus with background retinopathy (H)       Cranberry 400 MG Tabs      Take 400 mg by mouth 2 times daily        cyanocobalamin 1000 MCG/ML injection    VITAMIN B12    3 mL    Inject 1 mL (1,000 mcg) into the muscle every 30 days    Iron deficiency anemia, unspecified iron deficiency anemia type       famotidine 20 MG tablet    PEPCID    60 tablet    Take 1 tablet (20 mg) by mouth 2 times daily        FREESTYLE VOLODYMYR READER Ayah     1 Device    1 Device 4 times daily    Diabetes mellitus with background retinopathy (H)       gabapentin 300 MG capsule    NEURONTIN    360 capsule    Take 2 capsules (600 mg) by mouth 2 times daily May take an additional 300mg at bedtime daily with increase pain    Diabetic polyneuropathy associated with type 2 diabetes mellitus (H)       hydrocortisone 1%/eucerin 1% compounded cream     30 g    Apply topically 2 times daily    Rash and nonspecific skin eruption       labetalol 300 MG tablet    NORMODYNE    120 tablet    Take 0.5 tablets (150 mg) by mouth 2 times daily Take 1/2 tablet (150mg) =300mg. Hold for systolic BP less than 120.    Benign essential hypertension, Kidney  replaced by transplant       lactobacillus rhamnosus (GG) capsule     60 capsule    Take 1 capsule by mouth 2 times daily    Diarrhea       LANTUS SOLOSTAR 100 UNIT/ML injection   Generic drug:  insulin glargine     15 mL    Inject 22 Units Subcutaneous At Bedtime    Type 2 diabetes mellitus with diabetic polyneuropathy, with long-term current use of insulin (H)       levETIRAcetam 750 MG tablet    KEPPRA    60 tablet    Take 1 tablet (750 mg) by mouth 2 times daily        levothyroxine 50 MCG tablet    SYNTHROID/LEVOTHROID    90 tablet    TAKE ONE TABLET BY MOUTH EVERY DAY    Other specified hypothyroidism       lidocaine 2 % topical gel    XYLOCAINE    30 mL    Apply topically as needed for moderate pain 10 ML every 3 weeks    Johnson catheter in place       loratadine 10 MG tablet    CLARITIN    90 tablet    Take 1 tablet (10 mg) by mouth daily    Acute nasopharyngitis       Miconazole Nitrate 2 % Aero     1 Bottle    Externally apply 1 Dose topically 3 times daily    Fungal infection       NovoLOG FLEXPEN 100 UNIT/ML injection   Generic drug:  insulin aspart     15 mL    Inject Subcutaneous 3 times daily (with meals) Sliding scale    Type 2 diabetes mellitus with diabetic polyneuropathy, with long-term current use of insulin (H)       ondansetron 4 MG ODT tab    ZOFRAN ODT    20 tablet    Take 1 tablet (4 mg) by mouth daily as needed for nausea 30 minutes before getting up in your wheelchair    Vertigo       order for DME     1 Box    Equipment being ordered: Tegaderm 4x4  MICHELLE 6 months Sacral wound    Decubitus ulcer of sacral region, unspecified ulcer stage       pravastatin 10 MG tablet    PRAVACHOL    90 tablet    Take 1 tablet (10 mg) by mouth daily    Hyperlipidemia LDL goal <100       predniSONE 5 MG tablet    DELTASONE    90 tablet    Take 1 tablet (5 mg) by mouth daily    Kidney replaced by transplant       simethicone 125 MG Chew chewable tablet    MYLICON    120 tablet    Take 1 tablet (125 mg) by mouth  as needed for intestinal gas        * tacrolimus 1 MG capsule    GENERIC EQUIVALENT    120 capsule    Take 2 capsules (2 mg) by mouth 2 times daily Take with 0.5mg caps for total dose of 2.5mg BID    Kidney replaced by transplant       * tacrolimus 0.5 MG capsule    GENERIC EQUIVALENT     Take 1 capsule (0.5 mg) by mouth 2 times daily Take with 1mg caps for total dose of 2.5mg BID        VITAMIN D (CHOLECALCIFEROL) PO      Take 4,000 Units by mouth 2 times daily        * Notice:  This list has 4 medication(s) that are the same as other medications prescribed for you. Read the directions carefully, and ask your doctor or other care provider to review them with you.

## 2018-07-27 ENCOUNTER — TELEPHONE (OUTPATIENT)
Dept: FAMILY MEDICINE | Facility: CLINIC | Age: 73
End: 2018-07-27

## 2018-07-27 DIAGNOSIS — R39.9 UTI SYMPTOMS: Primary | ICD-10-CM

## 2018-07-27 NOTE — TELEPHONE ENCOUNTER
Pt's  called to ask for a call back from a RN due to pt being lethargic & almost non-communicative for 2 days now.     Best call back #: [phone] (okay to leave a detailed message)    Delma Flores

## 2018-07-27 NOTE — TELEPHONE ENCOUNTER
See alternate encounter - orders placed - home care to draw labs 7/27/2018 or 7/28/18    Carol Meneses RN

## 2018-07-27 NOTE — TELEPHONE ENCOUNTER
"    S: patient  Chang calling with concern for UTI - requesting assistance from clinic/provider to keep patient out of hospital    B:  Patient has had kidney transplant, catheter, recurrent UTI -  says she is often hospitalized for a week at a time up to \"35 times\" - resistance to antibiotics, hx encephalopathy, is home/bed bound     A: Afebrile -  reports temperature 97.8 - chills, slept almost 24 hours, is responsive and A & O x 3 - states she is stating she doesn't want to go to the hospital - she presents asymptomatic for urinary symptoms - poor fluid intake - poor nutritional intake - blood sugar 70 - gave juice    States he called [facility] RN Yashira [phone] - and was advised nurse cannot see patient today - he is requesting community paramedic to stop by and do assessment or collect urine    R:  Huddle with Swapna - we will order UAC, CBC, procalcitonin, BMP - please first try to get in contact with community paramedic to see if they can see patient today - if not - call home care they should be able to get labs - requesting labs to be drawn at the latest tomorrow 7/28/18    Patient would need to seek hospital care/ED/911 if she begins to present with symptoms of unresponsiveness      Called  [facility] - states she will try to contact team to get someone out there today but otherwise will do lab draw tomorrow morning     Chang informed of plan and agrees - no further questions at this time - encouraged fluids, and small frequent meals        Carol Meneses RN    "

## 2018-07-27 NOTE — TELEPHONE ENCOUNTER
Patient's  worried about UTI, fatigue, and slight mental confusion     P: Labs ordered    Klaudia MTZ

## 2018-07-28 LAB
ALBUMIN UR-MCNC: 30 MG/DL
ANION GAP SERPL CALCULATED.3IONS-SCNC: 9 MMOL/L (ref 3–14)
APPEARANCE UR: CLEAR
BASOPHILS # BLD AUTO: 0 10E9/L (ref 0–0.2)
BASOPHILS NFR BLD AUTO: 0.8 %
BILIRUB UR QL STRIP: NEGATIVE
BUN SERPL-MCNC: 48 MG/DL (ref 7–30)
CALCIUM SERPL-MCNC: 9 MG/DL (ref 8.5–10.1)
CHLORIDE SERPL-SCNC: 103 MMOL/L (ref 94–109)
CO2 SERPL-SCNC: 24 MMOL/L (ref 20–32)
COLOR UR AUTO: ABNORMAL
CREAT SERPL-MCNC: 1.69 MG/DL (ref 0.52–1.04)
DIFFERENTIAL METHOD BLD: ABNORMAL
EOSINOPHIL # BLD AUTO: 0.1 10E9/L (ref 0–0.7)
EOSINOPHIL NFR BLD AUTO: 2.5 %
ERYTHROCYTE [DISTWIDTH] IN BLOOD BY AUTOMATED COUNT: 13.6 % (ref 10–15)
GFR SERPL CREATININE-BSD FRML MDRD: 30 ML/MIN/1.7M2
GLUCOSE SERPL-MCNC: 95 MG/DL (ref 70–99)
GLUCOSE UR STRIP-MCNC: NEGATIVE MG/DL
HCT VFR BLD AUTO: 35.3 % (ref 35–47)
HGB BLD-MCNC: 11.2 G/DL (ref 11.7–15.7)
HGB UR QL STRIP: NEGATIVE
IMM GRANULOCYTES # BLD: 0 10E9/L (ref 0–0.4)
IMM GRANULOCYTES NFR BLD: 0.2 %
KETONES UR STRIP-MCNC: NEGATIVE MG/DL
LEUKOCYTE ESTERASE UR QL STRIP: ABNORMAL
LYMPHOCYTES # BLD AUTO: 1.7 10E9/L (ref 0.8–5.3)
LYMPHOCYTES NFR BLD AUTO: 33.3 %
MCH RBC QN AUTO: 30 PG (ref 26.5–33)
MCHC RBC AUTO-ENTMCNC: 31.7 G/DL (ref 31.5–36.5)
MCV RBC AUTO: 95 FL (ref 78–100)
MONOCYTES # BLD AUTO: 0.4 10E9/L (ref 0–1.3)
MONOCYTES NFR BLD AUTO: 8.1 %
NEUTROPHILS # BLD AUTO: 2.9 10E9/L (ref 1.6–8.3)
NEUTROPHILS NFR BLD AUTO: 55.1 %
NITRATE UR QL: NEGATIVE
NRBC # BLD AUTO: 0 10*3/UL
NRBC BLD AUTO-RTO: 0 /100
PH UR STRIP: 5.5 PH (ref 5–7)
PLATELET # BLD AUTO: 137 10E9/L (ref 150–450)
POTASSIUM SERPL-SCNC: 4.6 MMOL/L (ref 3.4–5.3)
PROCALCITONIN SERPL-MCNC: 0.05 NG/ML
RBC # BLD AUTO: 3.73 10E12/L (ref 3.8–5.2)
RBC #/AREA URNS AUTO: 8 /HPF (ref 0–2)
SODIUM SERPL-SCNC: 136 MMOL/L (ref 133–144)
SOURCE: ABNORMAL
SP GR UR STRIP: 1 (ref 1–1.03)
UROBILINOGEN UR STRIP-MCNC: NORMAL MG/DL (ref 0–2)
WBC # BLD AUTO: 5.2 10E9/L (ref 4–11)
WBC #/AREA URNS AUTO: 24 /HPF (ref 0–5)
YEAST #/AREA URNS HPF: ABNORMAL /HPF

## 2018-07-28 PROCEDURE — 85025 COMPLETE CBC W/AUTO DIFF WBC: CPT

## 2018-07-28 PROCEDURE — 80048 BASIC METABOLIC PNL TOTAL CA: CPT

## 2018-07-28 PROCEDURE — 84145 PROCALCITONIN (PCT): CPT

## 2018-07-28 PROCEDURE — 87106 FUNGI IDENTIFICATION YEAST: CPT

## 2018-07-28 PROCEDURE — 81001 URINALYSIS AUTO W/SCOPE: CPT

## 2018-07-28 PROCEDURE — 87086 URINE CULTURE/COLONY COUNT: CPT

## 2018-07-30 ENCOUNTER — DOCUMENTATION ONLY (OUTPATIENT)
Dept: TRANSPLANT | Facility: CLINIC | Age: 73
End: 2018-07-30

## 2018-07-30 ENCOUNTER — PATIENT OUTREACH (OUTPATIENT)
Dept: CARE COORDINATION | Facility: CLINIC | Age: 73
End: 2018-07-30

## 2018-07-30 DIAGNOSIS — N39.0 URINARY TRACT INFECTION ASSOCIATED WITH INDWELLING URETHRAL CATHETER, SUBSEQUENT ENCOUNTER: Primary | ICD-10-CM

## 2018-07-30 DIAGNOSIS — T83.511D URINARY TRACT INFECTION ASSOCIATED WITH INDWELLING URETHRAL CATHETER, SUBSEQUENT ENCOUNTER: Primary | ICD-10-CM

## 2018-07-30 LAB
BACTERIA SPEC CULT: ABNORMAL
BACTERIA SPEC CULT: ABNORMAL
Lab: ABNORMAL
SPECIMEN SOURCE: ABNORMAL

## 2018-07-30 RX ORDER — FLASH GLUCOSE SENSOR
1 KIT MISCELLANEOUS 4 TIMES DAILY
Qty: 1 DEVICE | Refills: 0 | Status: SHIPPED | OUTPATIENT
Start: 2018-07-30 | End: 2018-08-07

## 2018-07-30 RX ORDER — FLASH GLUCOSE SENSOR
KIT MISCELLANEOUS
Qty: 9 EACH | Refills: 3 | Status: SHIPPED | OUTPATIENT
Start: 2018-07-30 | End: 2018-08-07

## 2018-07-30 RX ORDER — TACROLIMUS 1 MG/1
2 CAPSULE ORAL 2 TIMES DAILY
Qty: 120 CAPSULE | Refills: 3 | Status: ON HOLD | COMMUNITY
Start: 2018-07-30 | End: 2018-09-19

## 2018-07-30 RX ORDER — TACROLIMUS 0.5 MG/1
0.5 CAPSULE ORAL 2 TIMES DAILY
Status: ON HOLD | COMMUNITY
Start: 2018-07-30 | End: 2018-09-19

## 2018-07-30 NOTE — PROGRESS NOTES
Clinic Care Coordination Contact  Care Team Conversations    See 7/27/18 tele encounter. Preliminary result from urine sample available. Will check tomorrow for final result.     Porsha Khalil R.N.  Clinic Care Coordinator  [facility]  [phone]

## 2018-07-31 ENCOUNTER — PATIENT OUTREACH (OUTPATIENT)
Dept: CARE COORDINATION | Facility: CLINIC | Age: 73
End: 2018-07-31

## 2018-07-31 DIAGNOSIS — N39.0 URINARY TRACT INFECTION ASSOCIATED WITH INDWELLING URETHRAL CATHETER, SUBSEQUENT ENCOUNTER: Primary | ICD-10-CM

## 2018-07-31 DIAGNOSIS — T83.511D URINARY TRACT INFECTION ASSOCIATED WITH INDWELLING URETHRAL CATHETER, SUBSEQUENT ENCOUNTER: Primary | ICD-10-CM

## 2018-07-31 NOTE — PLAN OF CARE
Problem: Goal Outcome Summary  Goal: Goal Outcome Summary  Outcome: No Change  Stayed up late tonight. Pleasant affect.  Repositioned twice.  Blood pressure has been stable this shift.  Makes needs known.         flank

## 2018-07-31 NOTE — PROGRESS NOTES
Clinic Care Coordination Contact  Care Team Conversations    See 7-27-18 encounters. UC results have returned. Chart sent to provider for plan. Please advise-thanks    Porsha Khalil R.N.  Clinic Care Coordinator  [facility]  [phone]

## 2018-07-31 NOTE — PROGRESS NOTES
No indication for UTI. Will have patient follow-up with PCP or schedule for an appointment if symptoms persist.   ANTHONY Ortega CNP

## 2018-08-01 NOTE — TELEPHONE ENCOUNTER
Routing to provider - Swapna/ROBERT - please review and advise as appropriate      Patient UAC results returned - can provider please review, interpret, and provide plan    Thank you,  Carol Meneses RN

## 2018-08-01 NOTE — PROGRESS NOTES
Clinic Care Coordination Contact  Care Team Conversations    left detailed message on answering machine with instructions per provider    Porsha Khalil R.N.  Clinic Care Coordinator  [facility]  [phone]

## 2018-08-01 NOTE — TELEPHONE ENCOUNTER
"Writer notes orders were reordered under MD Choudhury - no result response - culture complete - requested DOD review per Thron - if patient symptoms have improved or not worsened - no rx/antibiotic - please request home care to change catheter - If patient symptoms have worsened - forward to provider to order antibiotic - cipro - and order catheter change    Writer called and patient answered phone - she sounds cheerful and is speaking in clear sentences - she is awake and alert - states symptoms improvement - afebrile, catheter changed 8/31/2018, states \"everyone saying urine looks great\" - she just got done seeing home care nurse - plan to change catheter every 2 weeks - discussed with her no intervention needed at this time - please call the clinic for any questions or change in symptoms - she verbalized understanding and agrees with plan    Closing encounter - no further actions needed at this time    Carol Meneses RN    "

## 2018-08-01 NOTE — TELEPHONE ENCOUNTER
Urine culture returned with Candida growth.  Consulted with PharmD who consulted the Infectious Disease Pharmacist available today for potential treatment of a urine candida infection.   Agreement that the first intervention would be to change the Urinary catheter.  Clinic RN Jamaal called Gem to learn that the Catheter had been changed on Tuesday *7/31/2018, and that she is feeling significantly better.  Jamaal spoke to Gem directly.     At this time we will have home care follow as planned. Encouraged Gem and her family to call with new symptoms or worsening condition.     Kelsie Perez CNP, MELISSANP  [facility]

## 2018-08-02 ENCOUNTER — TELEPHONE (OUTPATIENT)
Dept: TRANSPLANT | Facility: CLINIC | Age: 73
End: 2018-08-02

## 2018-08-02 NOTE — TELEPHONE ENCOUNTER
Message  Received: Today       Ross Elizabeth MD Ut[place]alum, Teresa RN                       Previous Messages       ----- Message -----      From: Conchita Toledo, [facility]      Sent: 7/30/2018   1:55 PM        To: Ross Elizabeth MD     Hi   I'm the clinical pharmacist who works with Shantelle's primary care team. Her  Keshawn did not receive communication to reduce the tacrolimus dose a couple weeks ago and continued to set up her pill box with 2.5mg BID. Results from the last draw do not reflect a dose decrease.  Can you please forward this message to the RN team as well?     It would be best to have all communication on dose changes with Keshawn, pt's , as pt's memory is poor and Keshawn sets up her pill box. I'm not sure why there was a breakdown in communication going through their usual home care RN.     Thank you,   Conchita Toledo, PharmD, [facility]   [phone]          Information to call  Keshawn with dose changes added to Patient care coordination note.

## 2018-08-07 ENCOUNTER — ALLIED HEALTH/NURSE VISIT (OUTPATIENT)
Dept: BEHAVIORAL HEALTH | Facility: CLINIC | Age: 73
End: 2018-08-07
Payer: COMMERCIAL

## 2018-08-07 ENCOUNTER — TELEPHONE (OUTPATIENT)
Dept: FAMILY MEDICINE | Facility: CLINIC | Age: 73
End: 2018-08-07

## 2018-08-07 VITALS
SYSTOLIC BLOOD PRESSURE: 130 MMHG | HEART RATE: 66 BPM | OXYGEN SATURATION: 98 % | DIASTOLIC BLOOD PRESSURE: 72 MMHG | TEMPERATURE: 96.9 F | RESPIRATION RATE: 16 BRPM

## 2018-08-07 DIAGNOSIS — Z00.00 ENCOUNTER FOR ROUTINE ADULT HEALTH EXAMINATION WITHOUT ABNORMAL FINDINGS: Primary | ICD-10-CM

## 2018-08-07 DIAGNOSIS — M54.9 BACK PAIN: Primary | ICD-10-CM

## 2018-08-07 PROCEDURE — 99207 ZZC NO CHARGE NURSE ONLY: CPT

## 2018-08-07 PROCEDURE — 99207 C CASE MANAGEMENT EACH 15 MINUTES: CPT

## 2018-08-07 ASSESSMENT — PAIN SCALES - GENERAL: PAINLEVEL: NO PAIN (0)

## 2018-08-07 NOTE — TELEPHONE ENCOUNTER
[facility] Iman gel-T cushion 20 inches wide & 18 inches deep ordered per request.     Faxed over to [facility] at 3:05 PM      Sallie Perales RN  08/07/18  3:09 PM

## 2018-08-07 NOTE — PROGRESS NOTES
COMMUNITY PARAMEDICS FOLLOW UP/ MAINTENANCE VISIT    August 7, 2018    SUBJECTIVE:                                                      Gem Jade is a 72 year old female being seen at home for a Community Paramedic Home Visit.    Patient was seen along with CP Prieto Luna.    Acute concern/Follow-up issue: None voiced    Physical Exam                                                    /72 (BP Location: Left arm, Patient Position: Prone, Cuff Size: Adult Large)  Pulse 66  Temp 96.9  F (36.1  C) (Temporal)  Resp 16  SpO2 98%        Conclusion                                                      Pt states that she is feeling good and has no complaints. Pt is in good spirits. Home care nurse is coming out tomorrow to change her Johnson. Still waiting on OT to order a cushion for her WC so, she can sit up again. Pt's glucometer has been broken for a couple weeks and she has not received a new one yet.     Acute concern/Follow-up recommendations: None    Next CP visit scheduled: 9/4/18    Issues for Provider to follow up on: New glucometer.

## 2018-08-07 NOTE — PROGRESS NOTES
Received note from community paramedic that pt doesn't have a working glucometer currently.    Called  pharmacy, will have new Compact Plus glucometer sent out to pt's house tomorrow.    Called pt to inform.     Conchita Toledo, VivianD, BCACP

## 2018-08-07 NOTE — MR AVS SNAPSHOT
After Visit Summary   8/7/2018    Gem Jade    MRN: [id number]           Patient Information     Date Of Birth          1945        Visit Information        Provider Department      8/7/2018 12:00 PM 2, [facility] Community Par[facility]; 1, [facility] Paramed[facility]        Today's Diagnoses     Encounter for routine adult health examination without abnormal findings    -  1       Follow-ups after your visit        Follow-up notes from your care team     Return in 4 weeks (on 9/4/2018) for Routine Visit.      Your next 10 appointments already scheduled     Aug 21, 2018  1:30 PM CDT   TELEMEDICINE with Conchita Toledo [facility] [place] ([facility])    [address] [place] [address]  [address] [place] [id number] [phone]           Note: this is not an onsite visit; there is no need to come to the facility.            Aug 27, 2018  2:30 PM CDT   Telephone Visit with ANTHONY Antonio [facility] ([facility])    [address] [place] [address]  [address] [place] [id number] [phone]           Note: this is not an onsite visit; there is no need to come to the facility.              Who to contact     If you have questions or need follow up information about today's clinic visit or your schedule please contact [facility] directly at [phone].  Normal or non-critical lab and imaging results will be communicated to you by MyChart, letter or phone within 4 business days after the clinic has received the results. If you do not hear from us within 7 days, please contact the clinic through MyChart or phone. If you have a critical or abnormal lab result, we will notify you by phone as soon as possible.  Submit refill requests through [employer] or call your pharmacy and they will forward the refill  request to us. Please allow 3 business days for your refill to be completed.          Additional Information About Your Visit        MyChart Information     [employer] gives you secure access to your electronic health record. If you see a primary care provider, you can also send messages to your care team and make appointments. If you have questions, please call your primary care clinic.  If you do not have a primary care provider, please call [phone] and they will assist you.        Care EveryWhere ID     This is your Care EveryWhere ID. This could be used by other organizations to access your [place] medical records  [id number]        Your Vitals Were     Pulse Temperature Respirations Pulse Oximetry          66 96.9  F (36.1  C) (Temporal) 16 98%         Blood Pressure from Last 3 Encounters:   08/07/18 130/72   07/08/18 161/79   06/29/18 (!) 152/0    Weight from Last 3 Encounters:   07/08/18 223 lb 15.8 oz (101.6 kg)   06/01/18 232 lb 2.3 oz (105.3 kg)   01/25/18 233 lb 7.5 oz (105.9 kg)              We Performed the Following     C CASE MANAGEMENT EACH 15 MINUTES          Today's Medication Changes          These changes are accurate as of 8/7/18  2:22 PM.  If you have any questions, ask your nurse or doctor.               These medicines have changed or have updated prescriptions.        Dose/Directions    hydrocortisone 1%/eucerin 1% compounded cream   This may have changed:    - when to take this  - reasons to take this   Used for:  Rash and nonspecific skin eruption        Apply topically 2 times daily   Quantity:  30 g   Refills:  1                Primary Care Provider Office Phone # Fax #    ANTHONY Antonio [facility] [phone]       [address] [facility] [address]  [facility] [address]        Goals        General    Medical (pt-stated)     Notes - Note created  7/11/2018  3:20 PM by Kassandra Khalil, RN    #1-Goal Statement: I will reduce the number of UTI's I experience  Measure  of Success: reduction in number of UTI's  Supportive Steps to Achieve: home care nurse to request orders for more frequent catheter changes and increased pericare per week  Barriers: resistant UTI's  Strengths: assistance as above  Date to Achieve By: 8-15-18          Equal Access to Services     CARLOS ENRIQUE GREEN : Hadii aad ku hadchristinajavier Sojorgeali, wabettyda luqadaha, qairineota kaalmada mikegini, waxfazal sheyla emilianareggie patelhugocarlitos roman. So [facility] [phone].    ATENCIÓN: Si habla español, tiene a peguero disposición servicios gratuitos de asistencia lingüística. Llame al [phone].    We comply with applicable federal civil rights laws and [place] laws. We do not discriminate on the basis of race, color, national origin, age, disability, sex, sexual orientation, or gender identity.            Thank you!     Thank you for choosing [facility]  for your care. Our goal is always to provide you with excellent care. Hearing back from our patients is one way we can continue to improve our services. Please take a few minutes to complete the written survey that you may receive in the mail after your visit with us. Thank you!             Your Updated Medication List - Protect others around you: Learn how to safely use, store and throw away your medicines at www.disposemymeds.org.          This list is accurate as of 8/7/18  2:22 PM.  Always use your most recent med list.                   Brand Name Dispense Instructions for use Diagnosis    * ACCU-CHEK COMPACT PLUS test strip   Generic drug:  blood glucose monitoring     102 each    USE TO TEST BLOOD SUGAR THREE TIMES A DAY OR AS DIRECTED    Diabetic polyneuropathy associated with type 2 diabetes mellitus (H)       * blood glucose monitoring test strip    ACCU-CHEK GUIDE    100 strip    Use to test blood sugar 4-5 times daily or as directed.  Ok to dispense alternative if insurance prefers.    Diabetic polyneuropathy associated with type 2 diabetes  mellitus (H)       ACETAMINOPHEN PO      Take 325-650 mg by mouth every 4 hours as needed.        aspirin 81 MG tablet      Take 81 mg by mouth daily        baclofen 10 MG tablet    LIORESAL    90 tablet    Take 1 tablet (10 mg) by mouth 3 times daily as needed for muscle spasms        blood glucose monitoring lancets     100 each    Use to test blood sugar 4-5 times daily or as directed.  Ok to dispense alternative if insurance prefers    Diabetic polyneuropathy associated with type 2 diabetes mellitus (H)       CATAPRES PO      Take 0.1 mg by mouth 2 times daily        continuous blood glucose monitoring sensor     9 each    For use with Freestyle Volodymyr Flash  for continuous monitioring of blood glucose levels. Replace sensor every 10 days.    Diabetes mellitus with background retinopathy (H)       Cranberry 400 MG Tabs      Take 400 mg by mouth 2 times daily        cyanocobalamin 1000 MCG/ML injection    VITAMIN B12    3 mL    Inject 1 mL (1,000 mcg) into the muscle every 30 days    Iron deficiency anemia, unspecified iron deficiency anemia type       famotidine 20 MG tablet    PEPCID    60 tablet    Take 1 tablet (20 mg) by mouth 2 times daily        FREESTYLE VOLODYMYR READER Ayah     1 Device    1 Device 4 times daily    Diabetes mellitus with background retinopathy (H)       gabapentin 300 MG capsule    NEURONTIN    360 capsule    Take 2 capsules (600 mg) by mouth 2 times daily May take an additional 300mg at bedtime daily with increase pain    Diabetic polyneuropathy associated with type 2 diabetes mellitus (H)       hydrocortisone 1%/eucerin 1% compounded cream     30 g    Apply topically 2 times daily    Rash and nonspecific skin eruption       labetalol 300 MG tablet    NORMODYNE    120 tablet    Take 0.5 tablets (150 mg) by mouth 2 times daily Take 1/2 tablet (150mg) =300mg. Hold for systolic BP less than 120.    Benign essential hypertension, Kidney replaced by transplant       lactobacillus  rhamnosus (GG) capsule     60 capsule    Take 1 capsule by mouth 2 times daily    Diarrhea       LANTUS SOLOSTAR 100 UNIT/ML injection   Generic drug:  insulin glargine     15 mL    Inject 22 Units Subcutaneous At Bedtime    Type 2 diabetes mellitus with diabetic polyneuropathy, with long-term current use of insulin (H)       levETIRAcetam 750 MG tablet    KEPPRA    60 tablet    Take 1 tablet (750 mg) by mouth 2 times daily        levothyroxine 50 MCG tablet    SYNTHROID/LEVOTHROID    90 tablet    TAKE ONE TABLET BY MOUTH EVERY DAY    Other specified hypothyroidism       lidocaine 2 % topical gel    XYLOCAINE    30 mL    Apply topically as needed for moderate pain 10 ML every 3 weeks    Johnson catheter in place       loratadine 10 MG tablet    CLARITIN    90 tablet    Take 1 tablet (10 mg) by mouth daily    Acute nasopharyngitis       Miconazole Nitrate 2 % Aero     1 Bottle    Externally apply 1 Dose topically 3 times daily    Fungal infection       NovoLOG FLEXPEN 100 UNIT/ML injection   Generic drug:  insulin aspart     15 mL    Inject Subcutaneous 3 times daily (with meals) Sliding scale    Type 2 diabetes mellitus with diabetic polyneuropathy, with long-term current use of insulin (H)       ondansetron 4 MG ODT tab    ZOFRAN ODT    20 tablet    Take 1 tablet (4 mg) by mouth daily as needed for nausea 30 minutes before getting up in your wheelchair    Vertigo       order for DME     1 Box    Equipment being ordered: Tegaderm 4x4  MICHELLE 6 months Sacral wound    Decubitus ulcer of sacral region, unspecified ulcer stage       pravastatin 10 MG tablet    PRAVACHOL    90 tablet    Take 1 tablet (10 mg) by mouth daily    Hyperlipidemia LDL goal <100       predniSONE 5 MG tablet    DELTASONE    90 tablet    Take 1 tablet (5 mg) by mouth daily    Kidney replaced by transplant       simethicone 125 MG Chew chewable tablet    MYLICON    120 tablet    Take 1 tablet (125 mg) by mouth as needed for intestinal gas        *  tacrolimus 1 MG capsule    GENERIC EQUIVALENT    120 capsule    Take 2 capsules (2 mg) by mouth 2 times daily Take with 0.5mg caps for total dose of 2.5mg BID    Kidney replaced by transplant       * tacrolimus 0.5 MG capsule    GENERIC EQUIVALENT     Take 1 capsule (0.5 mg) by mouth 2 times daily Take with 1mg caps for total dose of 2.5mg BID        VITAMIN D (CHOLECALCIFEROL) PO      Take 4,000 Units by mouth 2 times daily        * Notice:  This list has 4 medication(s) that are the same as other medications prescribed for you. Read the directions carefully, and ask your doctor or other care provider to review them with you.

## 2018-08-09 ENCOUNTER — TELEPHONE (OUTPATIENT)
Dept: FAMILY MEDICINE | Facility: CLINIC | Age: 73
End: 2018-08-09

## 2018-08-09 DIAGNOSIS — Z46.6 URINARY CATHETER (FOLEY) CHANGE REQUIRED: Primary | ICD-10-CM

## 2018-08-09 LAB
ANION GAP SERPL CALCULATED.3IONS-SCNC: 8 MMOL/L (ref 3–14)
BASOPHILS # BLD AUTO: 0 10E9/L (ref 0–0.2)
BASOPHILS NFR BLD AUTO: 0.4 %
BUN SERPL-MCNC: 64 MG/DL (ref 7–30)
CALCIUM SERPL-MCNC: 9.2 MG/DL (ref 8.5–10.1)
CHLORIDE SERPL-SCNC: 103 MMOL/L (ref 94–109)
CO2 SERPL-SCNC: 27 MMOL/L (ref 20–32)
CREAT SERPL-MCNC: 1.31 MG/DL (ref 0.52–1.04)
DIFFERENTIAL METHOD BLD: ABNORMAL
EOSINOPHIL # BLD AUTO: 0.3 10E9/L (ref 0–0.7)
EOSINOPHIL NFR BLD AUTO: 4.7 %
ERYTHROCYTE [DISTWIDTH] IN BLOOD BY AUTOMATED COUNT: 13.2 % (ref 10–15)
GFR SERPL CREATININE-BSD FRML MDRD: 40 ML/MIN/1.7M2
GLUCOSE SERPL-MCNC: 205 MG/DL (ref 70–99)
HCT VFR BLD AUTO: 35.7 % (ref 35–47)
HGB BLD-MCNC: 11.5 G/DL (ref 11.7–15.7)
IMM GRANULOCYTES # BLD: 0 10E9/L (ref 0–0.4)
IMM GRANULOCYTES NFR BLD: 0.4 %
LYMPHOCYTES # BLD AUTO: 1.8 10E9/L (ref 0.8–5.3)
LYMPHOCYTES NFR BLD AUTO: 32.9 %
MCH RBC QN AUTO: 29.6 PG (ref 26.5–33)
MCHC RBC AUTO-ENTMCNC: 32.2 G/DL (ref 31.5–36.5)
MCV RBC AUTO: 92 FL (ref 78–100)
MONOCYTES # BLD AUTO: 0.5 10E9/L (ref 0–1.3)
MONOCYTES NFR BLD AUTO: 9.5 %
NEUTROPHILS # BLD AUTO: 2.9 10E9/L (ref 1.6–8.3)
NEUTROPHILS NFR BLD AUTO: 52.1 %
NRBC # BLD AUTO: 0 10*3/UL
NRBC BLD AUTO-RTO: 0 /100
PLATELET # BLD AUTO: 156 10E9/L (ref 150–450)
POTASSIUM SERPL-SCNC: 4.2 MMOL/L (ref 3.4–5.3)
RBC # BLD AUTO: 3.89 10E12/L (ref 3.8–5.2)
SODIUM SERPL-SCNC: 138 MMOL/L (ref 133–144)
TACROLIMUS BLD-MCNC: 5.8 UG/L (ref 5–15)
TME LAST DOSE: NORMAL H
WBC # BLD AUTO: 5.6 10E9/L (ref 4–11)

## 2018-08-09 PROCEDURE — 85025 COMPLETE CBC W/AUTO DIFF WBC: CPT | Performed by: INTERNAL MEDICINE

## 2018-08-09 PROCEDURE — 80197 ASSAY OF TACROLIMUS: CPT | Performed by: INTERNAL MEDICINE

## 2018-08-09 PROCEDURE — 80048 BASIC METABOLIC PNL TOTAL CA: CPT | Performed by: INTERNAL MEDICINE

## 2018-08-09 NOTE — TELEPHONE ENCOUNTER
Reason for Call:  Home Health Care    Yashira with [facility] called requesting orders    Orders are needed for this patient. Skilled nursing    Skilled Nursin x every other week for catheter change    Home Health Aide: 3 x weekly    Pt Provider: Klaudia    Phone Number Homecare Nurse can be reached at: [phone]     Can we leave a detailed message on this number? YES    Best Time: anytime    Call taken on 2018 at 4:14 PM by Delma Flores

## 2018-08-10 ENCOUNTER — NURSE TRIAGE (OUTPATIENT)
Dept: NURSING | Facility: CLINIC | Age: 73
End: 2018-08-10

## 2018-08-10 RX ORDER — BACLOFEN 10 MG/1
10 TABLET ORAL 2 TIMES DAILY PRN
Qty: 20 TABLET | Refills: 0 | Status: SHIPPED | OUTPATIENT
Start: 2018-08-10 | End: 2018-10-30

## 2018-08-10 RX ORDER — BACLOFEN 10 MG/1
10 TABLET ORAL 3 TIMES DAILY PRN
Qty: 90 TABLET | Refills: 4 | OUTPATIENT
Start: 2018-08-10

## 2018-08-10 NOTE — TELEPHONE ENCOUNTER
Return call to Yashira - nurse - gave verbal orders as requested    Closing encounter - no further actions needed at this time    Carol Meneses RN

## 2018-08-10 NOTE — TELEPHONE ENCOUNTER
Requested Prescriptions   Pending Prescriptions Disp Refills     baclofen (LIORESAL) 10 MG tablet 90 tablet 4     Sig: Take 1 tablet (10 mg) by mouth 3 times daily as needed for muscle spasms    There is no refill protocol information for this order           Last Written Prescription Date:  1/8/18  Last Fill Quantity: 90,   # refills: 4  Last Office Visit: 7/17/18  Future Office visit:       Routing refill request to provider for review/approval because:  Drug not on the [facility] or [facility] refill protocol or controlled substance

## 2018-08-10 NOTE — TELEPHONE ENCOUNTER
Return call to patient she report      Patient states she made request for Baclofen 10 mg for bladder spasms - usually experienced one day after catheter change and sometimes intermittently in between - spasms can cause incontinence- she takes one tablet and it is effective - she denies any confusion, mental status changes, hallucination, drowsiness    She previously was using for back spasm - she currently has an old bottle from 10/2016 - home care advised against taking this and encouraged her to request a refill    Huddle with Martín - please discontinue previous order and pend baclofen 10 mg with pharmacy for me    Carol Meneses RN

## 2018-08-10 NOTE — TELEPHONE ENCOUNTER
Please call the patient and see if she has restarted this medication, it was discontinued a couple of hospitalizations ago as it was felt it contributed to her mental confusion.

## 2018-08-10 NOTE — TELEPHONE ENCOUNTER
Martín - Pharmacy and medication pended      Patient notified of provider response    Thank you,  Carol Meneses RN

## 2018-08-11 NOTE — TELEPHONE ENCOUNTER
Patient calling requesting Baclofen medication. Reports spoke with PCP Marivel CRUZ CNP and was told medication will be called in to [facility] in [place]. Patient called pharmacy and pharmacy states they did not receive any order from provider.     Patient reports takes baclofen 10 mg for bladder spasm after Urinary Catheter change. Patient had her catheter changed today at 12:30pm. Denies having any other symptoms for triage.     Per Epic note provider has medication on pending.     RN paged on-call provider Dr. Pat Mcdermott for [facility] through answering services to call RN directly at [phone] at 8:41pm.     Dr. Mcdermott called RN back and stated she will put in Baclofen 10mg tablet BID as needed for patient. Instructed for patient to follow up with primary on Monday.     RN called patient and informed patient medication will be sent to requested pharmacy. Advised to follow up with primary on Monday. verrucous lesion    Dameon Holman RN  [facility]

## 2018-08-11 NOTE — TELEPHONE ENCOUNTER
Patient to follow-up with PCP Monday about plan of care moving forward. Unclear what the medication plan was from the phone conversation.   ANTHONY Ortega CNP

## 2018-08-12 ENCOUNTER — TELEPHONE (OUTPATIENT)
Dept: FAMILY MEDICINE | Facility: CLINIC | Age: 73
End: 2018-08-12

## 2018-08-16 ENCOUNTER — TELEPHONE (OUTPATIENT)
Dept: FAMILY MEDICINE | Facility: CLINIC | Age: 73
End: 2018-08-16

## 2018-08-16 NOTE — TELEPHONE ENCOUNTER
Call from Jessica with [employer] - they receive order for [employer] Iman gel-T cushion and they will need more information, like where did pt get her W/C from the name of the company, and a diagnosis (DME order says back pain but  insurance will not cover with this Dx). Also it will be better if pt get the cushion from the same company that she go the W/C from.    Jessica contact info: [phone]      Rich Henderson

## 2018-08-16 NOTE — TELEPHONE ENCOUNTER
Spoke with Jessica from [employer]--was informed that unless patient has wound or paralysis, cushion wouldn't be covered. Jessica suggested other cushion options, such as their basic curve cushion.    Left message for patient's OT to call back to discuss options for cushion as suggested by Jessica.     Sallie Perales RN  08/16/18  11:54 AM

## 2018-08-21 ENCOUNTER — ALLIED HEALTH/NURSE VISIT (OUTPATIENT)
Dept: PHARMACY | Facility: CLINIC | Age: 73
End: 2018-08-21
Payer: COMMERCIAL

## 2018-08-21 DIAGNOSIS — Z94.0 KIDNEY REPLACED BY TRANSPLANT: ICD-10-CM

## 2018-08-21 DIAGNOSIS — Z93.59 SUPRAPUBIC CATHETER (H): ICD-10-CM

## 2018-08-21 DIAGNOSIS — E11.3299 DIABETES MELLITUS WITH BACKGROUND RETINOPATHY (H): Primary | ICD-10-CM

## 2018-08-21 PROCEDURE — 99607 MTMS BY PHARM ADDL 15 MIN: CPT | Performed by: PHARMACIST

## 2018-08-21 PROCEDURE — 99606 MTMS BY PHARM EST 15 MIN: CPT | Performed by: PHARMACIST

## 2018-08-21 NOTE — TELEPHONE ENCOUNTER
Writer attempted to contact [employer] today. Voicemail left each time, with no response as of yet.     Sallie Perales RN  08/21/18  2:55 PM

## 2018-08-21 NOTE — PROGRESS NOTES
SUBJECTIVE/OBJECTIVE:                Gem Jade is a 72 year old female called for a follow-up visit for Medication Therapy Management.  She was referred to me from Marivel Barcenas.     Chief Complaint: Follow up from our visit on 18.    Tobacco: History of tobacco dependence- quit  Alcohol: not currently using    Medication Adherence/Access:  Patient has assistance with medication administration and setting up boxes (HCRN). They come three times weekly.      SP catheter: describes issues with catheter over the weekend, catheter was leaking but unable to have HCRN come out to replace and spent overnight cleaning up urine - issues has now been resolved.    Diabetes:  Pt currently taking lantus 24u subQ daily (increased dose a couple days ago because thought dose was supposed to be higher and saw this dose on a piece of paper) and novolog 10 units+ 2-8 units sliding scale with dinner. If patient has a meal with carbs, then they do an additional 10 units of novolog. Pt is not experiencing side effects.  SMB-2 times daily.   Ranges (read off from glucometer): usually running in 100-200s. Has been in the 400s occasionally.  Date FBG/ 2hours post Lunch/2hours post Dinner /2hours post bedtime    239       81  154       225 188      220        325       180     Patient is not experiencing hypoglycemia, but did not feel good with BS 81  Recent symptoms of high blood sugar? none  Eye exam: due  Foot exam: due  ACEi/ARB: No  Urine Albumin:   Lab Results   Component Value Date    UMALCR 6776.56 (H) 2017      Aspirin: Taking 81mg daily and denies side effects  Diet/Exercise: started a new diet this month, limiting desserts, smaller portions, increased vegetables, smaller amt of fruit. Has only been eating once a day.   Has not been able to get up in her wheelchair because waiting on a seat cushion, frustrated this hasn't been addressed by OT.     Hemoglobin A1C   Date Value Ref Range  Status   05/26/2018 8.8 (H) 0 - 5.6 % Final     Comment:     Normal <5.7% Prediabetes 5.7-6.4%  Diabetes 6.5% or higher - adopted from ADA   consensus guidelines.         S/p Kidney Transplant: Currently taking tacrolimus 2.5mg twice daily and prednisone 5mg every day. Denies SE. Lab monitoring up to date.    Today's Vitals: There were no vitals taken for this visit. - phone call      ASSESSMENT:              Current medications were reviewed today as discussed above.      Medication Adherence: good, no issues identified    SP catheter: issues resolved, has resumed regular management plan.     Diabetes: improved. A1c not at goal <8. Encouraged adherence to BS checks and Novolog injections. Reviewed Lantus dosing, resume 22u daily as prescribed. Encouraged pt to increase to two meals daily    S/p kidney transplant: stable       PLAN:                  No medication changes.  Sent message to PCP regarding issues with wheelchair cushion    I spent 40 minutes with this patient today. All changes were made via collaborative practice agreement with Marivel Barcenas. A copy of the visit note was provided to the patient's primary care provider.     Will follow up in 6 weeks.    The patient declined a summary of these recommendations as an after visit summary.    Conchita Toledo, PharmD, BCACP

## 2018-08-21 NOTE — MR AVS SNAPSHOT
After Visit Summary   8/21/2018    Gme Jade    MRN: [id number]           Patient Information     Date Of Birth          1945        Visit Information        Provider Department      8/21/2018 1:30 PM Conchita Toledo, [facility]        Today's Diagnoses     Diabetes mellitus with background retinopathy    -  1    Kidney replaced by transplant        Suprapubic catheter (H)           Follow-ups after your visit        Your next 10 appointments already scheduled     Aug 27, 2018  2:30 PM CDT   Telephone Visit with ANTHONY Antonio CNP   [facility]    [address]  [address] [place] [id number] [phone]           Note: this is not an onsite visit; there is no need to come to the facility.            Sep 04, 2018 12:00 PM CDT   Community Paramedic with  Community Paramedic 1   [facility]    [address]  [address] [place] [id number] [phone]            Sep 25, 2018  3:30 PM CDT   TELEMEDICINE with Conchita Toledo [facility] ([facility]    [address] [place] [address]  [address] [place] [id number] [phone]           Note: this is not an onsite visit; there is no need to come to the facility.              Who to contact     If you have questions or need follow up information about today's clinic visit or your schedule please contact [facility] directly at [phone].  Normal or non-critical lab and imaging results will be communicated to you by MyChart, letter or phone within 4 business days after the clinic has received the results. If you do not hear from us within 7 days, please contact the clinic through MyChart or phone. If you have a  critical or abnormal lab result, we will notify you by phone as soon as possible.  Submit refill requests through [employer] or call your pharmacy and they will forward the refill request to us. Please allow 3 business days for your refill to be completed.          Additional Information About Your Visit        [employer] Information     [employer] gives you secure access to your electronic health record. If you see a primary care provider, you can also send messages to your care team and make appointments. If you have questions, please call your primary care clinic.  If you do not have a primary care provider, please call [phone] and they will assist you.        Care EveryWhere ID     This is your Care EveryWhere ID. This could be used by other organizations to access your [facility] medical records  [id number]         Blood Pressure from Last 3 Encounters:   08/07/18 130/72   07/08/18 161/79   06/29/18 (!) 152/0    Weight from Last 3 Encounters:   07/08/18 223 lb 15.8 oz (101.6 kg)   06/01/18 232 lb 2.3 oz (105.3 kg)   01/25/18 233 lb 7.5 oz (105.9 kg)              Today, you had the following     No orders found for display         Today's Medication Changes          These changes are accurate as of 8/21/18 11:59 PM.  If you have any questions, ask your nurse or doctor.               These medicines have changed or have updated prescriptions.        Dose/Directions    hydrocortisone 1%/eucerin 1% compounded cream   This may have changed:    - when to take this  - reasons to take this   Used for:  Rash and nonspecific skin eruption        Apply topically 2 times daily   Quantity:  30 g   Refills:  1                Primary Care Provider Office Phone # Fax #    ANTHONY Antonio [phone]       [address] [place] [address] [address] [place] [id number]        Goals        General    Medical (pt-stated)     Notes - Note created  7/11/2018  3:20 PM by Kassandra Khalil, RN    #1-Goal Statement: I  will reduce the number of UTI's I experience  Measure of Success: reduction in number of UTI's  Supportive Steps to Achieve: home care nurse to request orders for more frequent catheter changes and increased pericare per week  Barriers: resistant UTI's  Strengths: assistance as above  Date to Achieve By: 8-15-18          Equal Access to Services     [employer]RI[employer]EN : Hadii aad ku hadchristinajavier Sojorgeali, waaxda luqadaha, qaybta kaalmada mikeradhajorje, jessica patelhugocarlitos roman. So [facility] [phone].    ATENCIÓN: Si habla español, tiene a peguero disposición servicios gratuitos de asistencia lingüística. Llame al [phone].    We comply with applicable federal civil rights laws and [place] laws. We do not discriminate on the basis of race, color, national origin, age, disability, sex, sexual orientation, or gender identity.            Thank you!     Thank you for choosing [facility]  for your care. Our goal is always to provide you with excellent care. Hearing back from our patients is one way we can continue to improve our services. Please take a few minutes to complete the written survey that you may receive in the mail after your visit with us. Thank you!             Your Updated Medication List - Protect others around you: Learn how to safely use, store and throw away your medicines at www.disposemymeds.org.          This list is accurate as of 8/21/18 11:59 PM.  Always use your most recent med list.                   Brand Name Dispense Instructions for use Diagnosis    ACCU-CHEK COMPACT PLUS test strip   Generic drug:  blood glucose monitoring     102 each    USE TO TEST BLOOD SUGAR THREE TIMES A DAY OR AS DIRECTED    Diabetic polyneuropathy associated with type 2 diabetes mellitus (H)       ACETAMINOPHEN PO      Take 325-650 mg by mouth every 4 hours as needed.        aspirin 81 MG tablet      Take 81 mg by mouth daily        baclofen 10 MG tablet    LIORESAL    20 tablet     Take 1 tablet (10 mg) by mouth 2 times daily as needed for other (Bladder Spasms)    Urinary catheter (Johnson) change required       cloNIDine 0.1 MG tablet    CATAPRES    180 tablet    TAKE ONE TABLET BY MOUTH TWICE A DAY    HTN, goal below 140/90       Cranberry 400 MG Tabs      Take 400 mg by mouth 2 times daily        cyanocobalamin 1000 MCG/ML injection    VITAMIN B12    3 mL    Inject 1 mL (1,000 mcg) into the muscle every 30 days    Iron deficiency anemia, unspecified iron deficiency anemia type       famotidine 20 MG tablet    PEPCID    60 tablet    Take 1 tablet (20 mg) by mouth 2 times daily        gabapentin 300 MG capsule    NEURONTIN    360 capsule    Take 2 capsules (600 mg) by mouth 2 times daily May take an additional 300mg at bedtime daily with increase pain    Diabetic polyneuropathy associated with type 2 diabetes mellitus (H)       hydrocortisone 1%/eucerin 1% compounded cream     30 g    Apply topically 2 times daily    Rash and nonspecific skin eruption       labetalol 300 MG tablet    NORMODYNE    120 tablet    Take 0.5 tablets (150 mg) by mouth 2 times daily Take 1/2 tablet (150mg) =300mg. Hold for systolic BP less than 120.    Benign essential hypertension, Kidney replaced by transplant       lactobacillus rhamnosus (GG) capsule     60 capsule    Take 1 capsule by mouth 2 times daily    Diarrhea       LANTUS SOLOSTAR 100 UNIT/ML injection   Generic drug:  insulin glargine     15 mL    Inject 22 Units Subcutaneous At Bedtime    Type 2 diabetes mellitus with diabetic polyneuropathy, with long-term current use of insulin (H)       levETIRAcetam 750 MG tablet    KEPPRA    60 tablet    Take 1 tablet (750 mg) by mouth 2 times daily        levothyroxine 50 MCG tablet    SYNTHROID/LEVOTHROID    90 tablet    TAKE ONE TABLET BY MOUTH EVERY DAY    Other specified hypothyroidism       lidocaine 2 % topical gel    XYLOCAINE    30 mL    Apply topically as needed for moderate pain 10 ML every 3 weeks     "Johnson catheter in place       loratadine 10 MG tablet    CLARITIN    90 tablet    Take 1 tablet (10 mg) by mouth daily    Acute nasopharyngitis       Miconazole Nitrate 2 % Aero     1 Bottle    Externally apply 1 Dose topically 3 times daily    Fungal infection       NovoLOG FLEXPEN 100 UNIT/ML injection   Generic drug:  insulin aspart     15 mL    Inject Subcutaneous 3 times daily (with meals) Sliding scale    Type 2 diabetes mellitus with diabetic polyneuropathy, with long-term current use of insulin (H)       ondansetron 4 MG ODT tab    ZOFRAN ODT    20 tablet    Take 1 tablet (4 mg) by mouth daily as needed for nausea 30 minutes before getting up in your wheelchair    [employer]       order for DME     1 Box    Equipment being ordered: Tegaderm 4x4  MICHELLE 6 months Sacral wound    Decubitus ulcer of sacral region, unspecified ulcer stage       order for DME     1 each    Equipment being ordered: [employer] gel-T cushion 20 inches wide & 18 inches deep  height 1.676 m (5' 6\"), weight 101.6 kg (223 lb 15.8 oz)    Back pain       pravastatin 10 MG tablet    PRAVACHOL    90 tablet    Take 1 tablet (10 mg) by mouth daily    Hyperlipidemia LDL goal <100       predniSONE 5 MG tablet    DELTASONE    90 tablet    Take 1 tablet (5 mg) by mouth daily    Kidney replaced by transplant       simethicone 125 MG Chew chewable tablet    MYLICON    120 tablet    Take 1 tablet (125 mg) by mouth as needed for intestinal gas        * tacrolimus 1 MG capsule    GENERIC EQUIVALENT    120 capsule    Take 2 capsules (2 mg) by mouth 2 times daily Take with 0.5mg caps for total dose of 2.5mg BID    Kidney replaced by transplant       * tacrolimus 0.5 MG capsule    GENERIC EQUIVALENT     Take 1 capsule (0.5 mg) by mouth 2 times daily Take with 1mg caps for total dose of 2.5mg BID        VITAMIN D (CHOLECALCIFEROL) PO      Take 4,000 Units by mouth 2 times daily        * Notice:  This list has 2 medication(s) that are the same as other " medications prescribed for you. Read the directions carefully, and ask your doctor or other care provider to review them with you.

## 2018-08-21 NOTE — TELEPHONE ENCOUNTER
Discussed with pt, says she currently has a wound on her bottom, right side, from skin tear with changing urine pad.     Pt is planning on calling Brenna CHOI to ask her to assist with Rx for cushion. Pt is unable to transfer into [facility] and sit in wheelchair without cushion, has been waiting on this order.    Will forward message to PCP to please address - can order be sent with a different diagnosis.     Conchita Toledo, VivianD, BCACP

## 2018-08-23 NOTE — TELEPHONE ENCOUNTER
Writer spoke with Brenna regarding w/c cushion. Stated she has a call in to the HC RN for more information about the wound on bottom and will return call after more information is gathered. Brenna hopes to have [facility] gel-T cushion covered as patient has history of pressure ulcers.     Sallie Perales RN  08/23/18  11:50 AM

## 2018-08-24 NOTE — TELEPHONE ENCOUNTER
Writer spoke with Brenna (patient's home OT)-- Brenna states she spoke with  who gave some information to help get the cushion covered.      Patient has a chronic catheter which leaks often causing incontinence associated dermatitis    Patient has rectal pain due to frequent diarrhea because of colitis    Patient has chronic skin irritation    Patient has a history of stage 2 and stage 3 pressure ulcers    Patient has been bed bound for years and only recently able to get in wheelchair    Patient is very immobile and unable to change positions in wheelchair independently    Called Jessica from [facility] [employer]-- left voicemail to call back to discuss above and see if any would work to get cushion covered.    Sallie Perales RN  08/24/18  1:39 PM

## 2018-09-04 ENCOUNTER — ALLIED HEALTH/NURSE VISIT (OUTPATIENT)
Dept: BEHAVIORAL HEALTH | Facility: CLINIC | Age: 73
End: 2018-09-04
Payer: COMMERCIAL

## 2018-09-04 VITALS
SYSTOLIC BLOOD PRESSURE: 122 MMHG | RESPIRATION RATE: 16 BRPM | TEMPERATURE: 97 F | OXYGEN SATURATION: 99 % | HEART RATE: 60 BPM

## 2018-09-04 DIAGNOSIS — Z76.89 HEALTH CARE HOME: Primary | ICD-10-CM

## 2018-09-04 PROCEDURE — 99207 C CASE MANAGEMENT EACH 15 MINUTES: CPT

## 2018-09-04 ASSESSMENT — PAIN SCALES - GENERAL: PAINLEVEL: NO PAIN (0)

## 2018-09-04 NOTE — PROGRESS NOTES
"COMMUNITY PARAMEDICS FOLLOW UP/ MAINTENANCE VISIT    September 4, 2018 [id number]    SUBJECTIVE:                                                      Gem Jade is a 72 year old female being seen at home for a Community Paramedic Home Visit.    Acute concern/Follow-up issue: recheck, monthly follow-up; catheter and vitals.    Recent blood sugars: 105    Physical Exam                                                    BP (!) 122/0  Pulse 60  Temp 97  F (36.1  C) (Temporal)  Resp 16  SpO2 99%      Pt states she is feeling \"good.\" Pt has no c/o. Pt's Home RN is making a home visit tomorrow 9/5 for labs, injection, and catheter change.    Pt denies SOB, P and CP.    Pt  taped Pt's catheter to Pt's leg so catheter will not fall/ slide out.      Conclusion                                                      Acute concern/Follow-up recommendations: catheter     Next CP visit scheduled: 10/10    Issues for Provider to follow up on: MAIKOL    Provider follow up visit needed: MAIKOL Luna on 9/4/2018 at 2:11 PM             "

## 2018-09-04 NOTE — MR AVS SNAPSHOT
After Visit Summary   9/4/2018    Gem Jade    MRN: [id number]           Patient Information     Date Of Birth          1945        Visit Information        Provider Department      9/4/2018 11:00 AM 1, [place] Community Paramedic [facility]        [facility]    -  1       Follow-ups after your visit        Your next 10 appointments already scheduled     Sep 25, 2018  2:00 PM CDT   Telephone Visit with ANTHONY Antonio CNP   [facility] ([facility])    [address]  [address] [place] [id number] [phone]           Note: this is not an onsite visit; there is no need to come to the facility.            Sep 25, 2018  3:30 PM CDT   TELEMEDICINE with Conchita Toledo [facility] ([facility])    [address] [place] [address]  [address] [place] [id number] [phone]           Note: this is not an onsite visit; there is no need to come to the facility.            Oct 10, 2018 12:00 PM CDT   Community Paramedic with Rodney [facility] Paramedic 1, Rodney [facility] Paramedic 2   [facility] ([facility])    [address]  [address] [place] [id number] [phone]              Who to contact     If you have questions or need follow up information about today's clinic visit or your schedule please contact [facility] directly at [phone].  Normal or non-critical lab and imaging results will be communicated to you by MyChart, letter or phone within 4 business days after the clinic has received the results. If you do not hear from us within 7 days, please contact the clinic through MyChart or phone. If you have a critical or abnormal lab result, we will notify you by phone as soon as  possible.  Submit refill requests through [employer] or call your pharmacy and they will forward the refill request to us. Please allow 3 business days for your refill to be completed.          Additional Information About Your Visit        [employer] Information     [employer] gives you secure access to your electronic health record. If you see a primary care provider, you can also send messages to your care team and make appointments. If you have questions, please call your primary care clinic.  If you do not have a primary care provider, please call [phone] and they will assist you.        Care EveryWhere ID     This is your Care EveryWhere ID. This could be used by other organizations to access your [place] medical records  [id number]        Your Vitals Were     Pulse Temperature Respirations Pulse Oximetry          60 97  F (36.1  C) (Temporal) 16 99%         Blood Pressure from Last 3 Encounters:   09/04/18 (!) 122/0   08/07/18 130/72   07/08/18 161/79    Weight from Last 3 Encounters:   07/08/18 223 lb 15.8 oz (101.6 kg)   06/01/18 232 lb 2.3 oz (105.3 kg)   01/25/18 233 lb 7.5 oz (105.9 kg)              We Performed the Following     C CASE MANAGEMENT EACH 15 MINUTES          Today's Medication Changes          These changes are accurate as of 9/4/18  2:11 PM.  If you have any questions, ask your nurse or doctor.               These medicines have changed or have updated prescriptions.        Dose/Directions    hydrocortisone 1%/eucerin 1% compounded cream   This may have changed:    - when to take this  - reasons to take this   Used for:  Rash and nonspecific skin eruption        Apply topically 2 times daily   Quantity:  30 g   Refills:  1                Primary Care Provider Office Phone # Fax #    ANTHONY Antonio [phone]       [address] [address]  [facility] [address]        Goals        General    Medical (pt-stated)     Notes - Note created  7/11/2018  3:20 PM by  Kassandra Khalil, RN    #1-Goal Statement: I will reduce the number of UTI's I experience  Measure of Success: reduction in number of UTI's  Supportive Steps to Achieve: home care nurse to request orders for more frequent catheter changes and increased pericare per week  Barriers: resistant UTI's  Strengths: assistance as above  Date to Achieve By: 8-15-18          Equal Access to Services     [facility]: Hadii aad ku hadasho Soomaali, waaxda luqadaha, qaybta kaalmada adeegyada, waxay sheyla haybrittaneyn adeblaine coxcarlitos lajunior . So [facility] [phone].    ATENCIÓN: Si habla español, tiene a peguero disposición servicios gratuitos de asistencia lingüística. Llame al [phone].    We comply with applicable federal civil rights laws and [place] laws. We do not discriminate on the basis of race, color, national origin, age, disability, sex, sexual orientation, or gender identity.            Thank you!     Thank you for choosing [facility]  for your care. Our goal is always to provide you with excellent care. Hearing back from our patients is one way we can continue to improve our services. Please take a few minutes to complete the written survey that you may receive in the mail after your visit with us. Thank you!             Your Updated Medication List - Protect others around you: Learn how to safely use, store and throw away your medicines at www.disposemymeds.org.          This list is accurate as of 9/4/18  2:11 PM.  Always use your most recent med list.                   Brand Name Dispense Instructions for use Diagnosis    ACCU-CHEK COMPACT PLUS test strip   Generic drug:  blood glucose monitoring     102 each    USE TO TEST BLOOD SUGAR THREE TIMES A DAY OR AS DIRECTED    Diabetic polyneuropathy associated with type 2 diabetes mellitus (H)       ACETAMINOPHEN PO      Take 325-650 mg by mouth every 4 hours as needed.        aspirin 81 MG tablet      Take 81 mg by mouth daily         baclofen 10 MG tablet    LIORESAL    20 tablet    Take 1 tablet (10 mg) by mouth 2 times daily as needed for other (Bladder Spasms)    Urinary catheter (Johnson) change required       cloNIDine 0.1 MG tablet    CATAPRES    180 tablet    TAKE ONE TABLET BY MOUTH TWICE A DAY    HTN, goal below 140/90       Cranberry 400 MG Tabs      Take 400 mg by mouth 2 times daily        cyanocobalamin 1000 MCG/ML injection    VITAMIN B12    3 mL    Inject 1 mL (1,000 mcg) into the muscle every 30 days    Iron deficiency anemia, unspecified iron deficiency anemia type       famotidine 20 MG tablet    PEPCID    60 tablet    Take 1 tablet (20 mg) by mouth 2 times daily        gabapentin 300 MG capsule    NEURONTIN    360 capsule    Take 2 capsules (600 mg) by mouth 2 times daily May take an additional 300mg at bedtime daily with increase pain    Diabetic polyneuropathy associated with type 2 diabetes mellitus (H)       hydrocortisone 1%/eucerin 1% compounded cream     30 g    Apply topically 2 times daily    Rash and nonspecific skin eruption       labetalol 300 MG tablet    NORMODYNE    120 tablet    Take 0.5 tablets (150 mg) by mouth 2 times daily Take 1/2 tablet (150mg) =300mg. Hold for systolic BP less than 120.    Benign essential hypertension, Kidney replaced by transplant       lactobacillus rhamnosus (GG) capsule     60 capsule    Take 1 capsule by mouth 2 times daily    Diarrhea       LANTUS SOLOSTAR 100 UNIT/ML injection   Generic drug:  insulin glargine     15 mL    Inject 22 Units Subcutaneous At Bedtime    Type 2 diabetes mellitus with diabetic polyneuropathy, with long-term current use of insulin (H)       levETIRAcetam 750 MG tablet    KEPPRA    60 tablet    Take 1 tablet (750 mg) by mouth 2 times daily        levothyroxine 50 MCG tablet    SYNTHROID/LEVOTHROID    90 tablet    TAKE ONE TABLET BY MOUTH EVERY DAY    Other specified hypothyroidism       lidocaine 2 % topical gel    XYLOCAINE    30 mL    Apply topically as  "needed for moderate pain 10 ML every 3 weeks    Johnson catheter in place       loratadine 10 MG tablet    CLARITIN    90 tablet    Take 1 tablet (10 mg) by mouth daily    Acute nasopharyngitis       NovoLOG FLEXPEN 100 UNIT/ML injection   Generic drug:  insulin aspart     15 mL    Inject Subcutaneous 3 times daily (with meals) Sliding scale    Type 2 diabetes mellitus with diabetic polyneuropathy, with long-term current use of insulin (H)       ondansetron 4 MG ODT tab    ZOFRAN ODT    20 tablet    Take 1 tablet (4 mg) by mouth daily as needed for nausea 30 minutes before getting up in your wheelchair    Vertigo       order for DME     1 Box    Equipment being ordered: Tegaderm 4x4  MICHELLE 6 months Sacral wound    Decubitus ulcer of sacral region, unspecified ulcer stage       order for DME     1 each    Equipment being ordered: [employer] gel-T cushion 20 inches wide & 18 inches deep  height 1.676 m (5' 6\"), weight 101.6 kg (223 lb 15.8 oz)    Back pain       pravastatin 10 MG tablet    PRAVACHOL    90 tablet    Take 1 tablet (10 mg) by mouth daily    Hyperlipidemia LDL goal <100       predniSONE 5 MG tablet    DELTASONE    90 tablet    Take 1 tablet (5 mg) by mouth daily    Kidney replaced by transplant       simethicone 125 MG Chew chewable tablet    MYLICON    120 tablet    Take 1 tablet (125 mg) by mouth as needed for intestinal gas        * tacrolimus 1 MG capsule    GENERIC EQUIVALENT    120 capsule    Take 2 capsules (2 mg) by mouth 2 times daily Take with 0.5mg caps for total dose of 2.5mg BID    Kidney replaced by transplant       * tacrolimus 0.5 MG capsule    GENERIC EQUIVALENT     Take 1 capsule (0.5 mg) by mouth 2 times daily Take with 1mg caps for total dose of 2.5mg BID        VITAMIN D (CHOLECALCIFEROL) PO      Take 4,000 Units by mouth 2 times daily        * Notice:  This list has 2 medication(s) that are the same as other medications prescribed for you. Read the directions carefully, and ask your " doctor or other care provider to review them with you.

## 2018-09-05 LAB
ANION GAP SERPL CALCULATED.3IONS-SCNC: 8 MMOL/L (ref 3–14)
BASOPHILS # BLD AUTO: 0 10E9/L (ref 0–0.2)
BASOPHILS NFR BLD AUTO: 0.6 %
BUN SERPL-MCNC: 46 MG/DL (ref 7–30)
CALCIUM SERPL-MCNC: 9.3 MG/DL (ref 8.5–10.1)
CHLORIDE SERPL-SCNC: 108 MMOL/L (ref 94–109)
CO2 SERPL-SCNC: 26 MMOL/L (ref 20–32)
CREAT SERPL-MCNC: 1.54 MG/DL (ref 0.52–1.04)
DIFFERENTIAL METHOD BLD: ABNORMAL
EOSINOPHIL # BLD AUTO: 0.2 10E9/L (ref 0–0.7)
EOSINOPHIL NFR BLD AUTO: 3.8 %
ERYTHROCYTE [DISTWIDTH] IN BLOOD BY AUTOMATED COUNT: 13.9 % (ref 10–15)
GFR SERPL CREATININE-BSD FRML MDRD: 33 ML/MIN/1.7M2
GLUCOSE SERPL-MCNC: 85 MG/DL (ref 70–99)
HCT VFR BLD AUTO: 36.3 % (ref 35–47)
HGB BLD-MCNC: 11.5 G/DL (ref 11.7–15.7)
IMM GRANULOCYTES # BLD: 0 10E9/L (ref 0–0.4)
IMM GRANULOCYTES NFR BLD: 0.4 %
LYMPHOCYTES # BLD AUTO: 2.1 10E9/L (ref 0.8–5.3)
LYMPHOCYTES NFR BLD AUTO: 39.3 %
MCH RBC QN AUTO: 29.5 PG (ref 26.5–33)
MCHC RBC AUTO-ENTMCNC: 31.7 G/DL (ref 31.5–36.5)
MCV RBC AUTO: 93 FL (ref 78–100)
MONOCYTES # BLD AUTO: 0.4 10E9/L (ref 0–1.3)
MONOCYTES NFR BLD AUTO: 8.2 %
NEUTROPHILS # BLD AUTO: 2.5 10E9/L (ref 1.6–8.3)
NEUTROPHILS NFR BLD AUTO: 47.7 %
NRBC # BLD AUTO: 0 10*3/UL
NRBC BLD AUTO-RTO: 0 /100
PLATELET # BLD AUTO: 150 10E9/L (ref 150–450)
POTASSIUM SERPL-SCNC: 4 MMOL/L (ref 3.4–5.3)
RBC # BLD AUTO: 3.9 10E12/L (ref 3.8–5.2)
SODIUM SERPL-SCNC: 142 MMOL/L (ref 133–144)
WBC # BLD AUTO: 5.2 10E9/L (ref 4–11)

## 2018-09-05 PROCEDURE — 85025 COMPLETE CBC W/AUTO DIFF WBC: CPT | Performed by: INTERNAL MEDICINE

## 2018-09-05 PROCEDURE — 80048 BASIC METABOLIC PNL TOTAL CA: CPT | Performed by: INTERNAL MEDICINE

## 2018-09-05 PROCEDURE — 80197 ASSAY OF TACROLIMUS: CPT | Performed by: INTERNAL MEDICINE

## 2018-09-06 LAB
TACROLIMUS BLD-MCNC: 6.2 UG/L (ref 5–15)
TME LAST DOSE: NORMAL H

## 2018-09-11 DIAGNOSIS — Z53.9 DIAGNOSIS NOT YET DEFINED: Primary | ICD-10-CM

## 2018-09-11 PROCEDURE — G0179 MD RECERTIFICATION HHA PT: HCPCS

## 2018-09-12 ENCOUNTER — TELEPHONE (OUTPATIENT)
Dept: BEHAVIORAL HEALTH | Facility: CLINIC | Age: 73
End: 2018-09-12

## 2018-09-12 ENCOUNTER — TELEPHONE (OUTPATIENT)
Dept: FAMILY MEDICINE | Facility: CLINIC | Age: 73
End: 2018-09-12

## 2018-09-12 DIAGNOSIS — R40.4 TRANSIENT ALTERATION OF AWARENESS: Primary | ICD-10-CM

## 2018-09-12 DIAGNOSIS — N39.0 RECURRENT UTI: ICD-10-CM

## 2018-09-12 DIAGNOSIS — D50.9 IRON DEFICIENCY ANEMIA, UNSPECIFIED IRON DEFICIENCY ANEMIA TYPE: ICD-10-CM

## 2018-09-12 NOTE — TELEPHONE ENCOUNTER
Spoke with CP about note below, we will have her  check her sugars 3x daily and adm her IR insulin sliding scale if indicated, hold her long acting insulin if not eating, will do some labs to rule out an infection,  Please call her  and let him know above, please call [facility] nurse to draw labs, please see if she is taking any of her meds    Klaudia MTZ

## 2018-09-12 NOTE — TELEPHONE ENCOUNTER
Spoke with Yashira, she will attempt to get her triage team out to draw blood tonight or 1st thing in am,  has held long acting insulin for the past 2 days, fever lasted from 9/6/18 to9/9/18 and afebrile last 3 days .   also asked if the V B12 injection should be given? Per  she has been taking meds.  Marivel Klein RN

## 2018-09-12 NOTE — TELEPHONE ENCOUNTER
"COMMUNITY PARAMEDICS FOLLOW UP/ MAINTENANCE VISIT    September 12, 2018 [id number]    SUBJECTIVE:                                                      Gem Jade is a 72 year old female, telephone follow-up from a  Community Paramedic.     The Outgoing call was made by CP to set up appointment for this Friday 9/14 at 1330. Appointment was made.    Pt's  answered the phone and was talking for the Pt. Pt per  has AMS due to a potential infection. Pt has had AMS for 5 days, running a temperature between 97.2-98.6. Pt has had confusion about events per  and per  she does not get like this unless she has an infection of some nature.     Pt's  was requested by CP to take a blood sugar check and results were 435.     Temperature was 97.4.    Urine within catheter bag was clear-yellow ( denies dark yellow/ cloudy. Also denies fowl smelling urine).     Concerns were made by , Pt unable to use phone due to confusion. Denies need for hospital. Pt's  disclosed \"We do not go to the hospital unless Pt is unresponsive.\"     Pt's  disclosed Pt has not eaten in two days.    Marivel Okeefe; Pt's PCP will be contacted inregards to telephone encounter with Pt/ , for direction and further care if needed.    Deepti Dyson on 9/12/2018 at 1:00 PM                 "

## 2018-09-13 LAB
ANION GAP SERPL CALCULATED.3IONS-SCNC: 12 MMOL/L (ref 3–14)
BASOPHILS # BLD AUTO: 0 10E9/L (ref 0–0.2)
BASOPHILS NFR BLD AUTO: 0.4 %
BUN SERPL-MCNC: 60 MG/DL (ref 7–30)
CALCIUM SERPL-MCNC: 8.8 MG/DL (ref 8.5–10.1)
CHLORIDE SERPL-SCNC: 97 MMOL/L (ref 94–109)
CO2 SERPL-SCNC: 19 MMOL/L (ref 20–32)
CREAT SERPL-MCNC: 2.02 MG/DL (ref 0.52–1.04)
DIFFERENTIAL METHOD BLD: ABNORMAL
EOSINOPHIL # BLD AUTO: 0.1 10E9/L (ref 0–0.7)
EOSINOPHIL NFR BLD AUTO: 0.7 %
ERYTHROCYTE [DISTWIDTH] IN BLOOD BY AUTOMATED COUNT: 13.5 % (ref 10–15)
GFR SERPL CREATININE-BSD FRML MDRD: 24 ML/MIN/1.7M2
GLUCOSE SERPL-MCNC: 535 MG/DL (ref 70–99)
HCT VFR BLD AUTO: 33.3 % (ref 35–47)
HGB BLD-MCNC: 10.5 G/DL (ref 11.7–15.7)
IMM GRANULOCYTES # BLD: 0.1 10E9/L (ref 0–0.4)
IMM GRANULOCYTES NFR BLD: 1.1 %
LYMPHOCYTES # BLD AUTO: 1.2 10E9/L (ref 0.8–5.3)
LYMPHOCYTES NFR BLD AUTO: 14.7 %
MCH RBC QN AUTO: 28.9 PG (ref 26.5–33)
MCHC RBC AUTO-ENTMCNC: 31.5 G/DL (ref 31.5–36.5)
MCV RBC AUTO: 92 FL (ref 78–100)
MONOCYTES # BLD AUTO: 0.6 10E9/L (ref 0–1.3)
MONOCYTES NFR BLD AUTO: 7.2 %
NEUTROPHILS # BLD AUTO: 6.2 10E9/L (ref 1.6–8.3)
NEUTROPHILS NFR BLD AUTO: 75.9 %
NRBC # BLD AUTO: 0 10*3/UL
NRBC BLD AUTO-RTO: 0 /100
PLATELET # BLD AUTO: 141 10E9/L (ref 150–450)
POTASSIUM SERPL-SCNC: 4.7 MMOL/L (ref 3.4–5.3)
PROCALCITONIN SERPL-MCNC: 0.48 NG/ML
RBC # BLD AUTO: 3.63 10E12/L (ref 3.8–5.2)
SODIUM SERPL-SCNC: 128 MMOL/L (ref 133–144)
WBC # BLD AUTO: 8.2 10E9/L (ref 4–11)

## 2018-09-13 PROCEDURE — 80048 BASIC METABOLIC PNL TOTAL CA: CPT

## 2018-09-13 PROCEDURE — 87186 SC STD MICRODIL/AGAR DIL: CPT

## 2018-09-13 PROCEDURE — 87086 URINE CULTURE/COLONY COUNT: CPT

## 2018-09-13 PROCEDURE — 85025 COMPLETE CBC W/AUTO DIFF WBC: CPT

## 2018-09-13 PROCEDURE — 87088 URINE BACTERIA CULTURE: CPT

## 2018-09-13 PROCEDURE — 84145 PROCALCITONIN (PCT): CPT

## 2018-09-13 RX ORDER — CYANOCOBALAMIN 1000 UG/ML
1000 INJECTION, SOLUTION INTRAMUSCULAR; SUBCUTANEOUS
Qty: 3 ML | Refills: 3 | Status: SHIPPED | OUTPATIENT
Start: 2018-09-13 | End: 2019-06-27

## 2018-09-13 NOTE — TELEPHONE ENCOUNTER
Prescription approved per [facility].  Marivel Klein RN

## 2018-09-14 ENCOUNTER — OFFICE VISIT (OUTPATIENT)
Dept: FAMILY MEDICINE | Facility: CLINIC | Age: 73
End: 2018-09-14
Payer: COMMERCIAL

## 2018-09-14 ENCOUNTER — ALLIED HEALTH/NURSE VISIT (OUTPATIENT)
Dept: BEHAVIORAL HEALTH | Facility: CLINIC | Age: 73
End: 2018-09-14
Payer: COMMERCIAL

## 2018-09-14 VITALS — SYSTOLIC BLOOD PRESSURE: 132 MMHG | DIASTOLIC BLOOD PRESSURE: 76 MMHG | OXYGEN SATURATION: 98 % | HEART RATE: 62 BPM

## 2018-09-14 DIAGNOSIS — E66.01 MORBID OBESITY, UNSPECIFIED OBESITY TYPE (H): ICD-10-CM

## 2018-09-14 DIAGNOSIS — Z76.89 HEALTH CARE HOME: Primary | ICD-10-CM

## 2018-09-14 DIAGNOSIS — L89.159 DECUBITUS ULCER OF SACRAL REGION, UNSPECIFIED ULCER STAGE: ICD-10-CM

## 2018-09-14 DIAGNOSIS — G93.41 METABOLIC ENCEPHALOPATHY: ICD-10-CM

## 2018-09-14 DIAGNOSIS — E11.3299 DIABETES MELLITUS WITH BACKGROUND RETINOPATHY (H): ICD-10-CM

## 2018-09-14 DIAGNOSIS — N39.0 RECURRENT UTI: ICD-10-CM

## 2018-09-14 DIAGNOSIS — E87.1 HYPONATREMIA: Primary | ICD-10-CM

## 2018-09-14 DIAGNOSIS — I10 BENIGN ESSENTIAL HYPERTENSION: ICD-10-CM

## 2018-09-14 PROCEDURE — 99207 C CASE MANAGEMENT EACH 15 MINUTES: CPT

## 2018-09-14 PROCEDURE — 99350 HOME/RES VST EST HIGH MDM 60: CPT | Performed by: NURSE PRACTITIONER

## 2018-09-14 NOTE — Clinical Note
Please contact her [facility] CM and let her know that we would like the nurses to set up and monitor her medications

## 2018-09-14 NOTE — MR AVS SNAPSHOT
After Visit Summary   9/14/2018    Gem Jade    MRN: [id number]           Patient Information     Date Of Birth          1945        Visit Information        Provider Department      9/14/2018 1:30 PM Marivel Barcenas APRN CNP [facility]        Today's Diagnoses     Hyponatremia    -  1    Decubitus ulcer of sacral region, unspecified ulcer stage        Recurrent UTI        Benign essential hypertension        Morbid obesity, unspecified obesity type (H)        Altered Mental Status, Probable Metabolic encephalopathy        Diabetes mellitus with background retinopathy (H)          Care Instructions    Restrict fluids to no more than 1500cc daily    Eat small freq meals,     Restart sliding scale insulin and reduce Lantus to 16U daily     We will call you once we get the final UC results, call if sx worsen     We will have the [facility] nurses set up your meds           Follow-ups after your visit        Follow-up notes from your care team     Return in about 4 weeks (around 10/12/2018) for co-visit with PCP and C, PharmD  co visit.      Your next 10 appointments already scheduled     Sep 25, 2018  2:00 PM CDT   Telephone Visit with ANTHONY Antonio CNP   [facility] ([facility])    [address] [place] [address] [phone]           Note: this is not an onsite visit; there is no need to come to the facility.            Sep 25, 2018  3:30 PM CDT   TELEMEDICINE with Conchita Toledo [facility] [place] ([facility])    [address] [place] [id number] [phone]           Note: this is not an onsite visit; there is no need to come to the facility.            Oct 10, 2018 12:00 PM CDT   Community Paramedic with Rodney Community Paramedic 1, Rodney Community Paramedic  2   [facility] ([facility])    [address]  [address]  [facility] [address] [phone]              Future tests that were ordered for you today     Open Standing Orders        Priority Remaining Interval Expires Ordered    Glucose monitor nursing POCT STAT [id number] PRN  9/16/2018    Glucose monitor nursing POCT STAT [id number] PRN  9/16/2018    Activity: Up with assist STAT [id number] PRN  9/16/2018            Who to contact     If you have questions or need follow up information about today's clinic visit or your schedule please contact [facility] directly at [phone].  Normal or non-critical lab and imaging results will be communicated to you by SocialMarthart, letter or phone within 4 business days after the clinic has received the results. If you do not hear from us within 7 days, please contact the clinic through [employer] or phone. If you have a critical or abnormal lab result, we will notify you by phone as soon as possible.  Submit refill requests through [email or web address] or call your pharmacy and they will forward the refill request to us. Please allow 3 business days for your refill to be completed.          Additional Information About Your Visit        [email or web address] Information     [email or web address] gives you secure access to your electronic health record. If you see a primary care provider, you can also send messages to your care team and make appointments. If you have questions, please call your primary care clinic.  If you do not have a primary care provider, please call [phone] and they will assist you.        Care EveryWhere ID     This is your Care EveryWhere ID. This could be used by other organizations to access your [place] medical records  [id number]        Your Vitals Were     Pulse Pulse Oximetry                62 98%           Blood Pressure from Last 3 Encounters:   09/16/18 169/80   09/14/18 132/76    09/04/18 (!) 122/0    Weight from Last 3 Encounters:   07/08/18 223 lb 15.8 oz (101.6 kg)   06/01/18 232 lb 2.3 oz (105.3 kg)   01/25/18 233 lb 7.5 oz (105.9 kg)                 Today's Medication Changes          These changes are accurate as of 9/14/18 11:59 PM.  If you have any questions, ask your nurse or doctor.               These medicines have changed or have updated prescriptions.        Dose/Directions    hydrocortisone 1%/eucerin 1% compounded cream   This may have changed:    - when to take this  - reasons to take this   Used for:  Rash and nonspecific skin eruption        Apply topically 2 times daily   Quantity:  30 g   Refills:  1            Where to get your medicines      Some of these will need a paper prescription and others can be bought over the counter.  Ask your nurse if you have questions.     Bring a paper prescription for each of these medications     order for DME                Primary Care Provider Office Phone # Fax #    ANTHONY Antonio [facility] [phone]       [address] [place] [id number]        Goals        General    Medical (pt-stated)     Notes - Note created  7/11/2018  3:20 PM by Kassandra Khalil, RN    #1-Goal Statement: I will reduce the number of UTI's I experience  Measure of Success: reduction in number of UTI's  Supportive Steps to Achieve: home care nurse to request orders for more frequent catheter changes and increased pericare per week  Barriers: resistant UTI's  Strengths: assistance as above  Date to Achieve By: 8-15-18          Equal Access to Services     [facility] AH: Hadii jairo green hadasho Sojorgeali, waaxda luqadaha, qaybta kaalmada adeegyada, jessica aguilar . So [facility] [phone].    ATENCIÓN: Si habla español, tiene a peguero disposición servicios gratuitos de asistencia lingüística. Llame al [phone].    We comply with applicable federal civil rights laws and [place] laws. We do not  discriminate on the basis of race, color, national origin, age, disability, sex, sexual orientation, or gender identity.            Thank you!     Thank you for choosing [facility]  for your care. Our goal is always to provide you with excellent care. Hearing back from our patients is one way we can continue to improve our services. Please take a few minutes to complete the written survey that you may receive in the mail after your visit with us. Thank you!             Your Updated Medication List - Protect others around you: Learn how to safely use, store and throw away your medicines at www.disposemymeds.org.          This list is accurate as of 9/14/18 11:59 PM.  Always use your most recent med list.                   Brand Name Dispense Instructions for use Diagnosis    ACCU-CHEK COMPACT PLUS test strip   Generic drug:  blood glucose monitoring     102 each    USE TO TEST BLOOD SUGAR THREE TIMES A DAY OR AS DIRECTED    Diabetic polyneuropathy associated with type 2 diabetes mellitus (H)       ACETAMINOPHEN PO      Take 325-650 mg by mouth every 4 hours as needed.        aspirin 81 MG tablet      Take 81 mg by mouth daily        baclofen 10 MG tablet    LIORESAL    20 tablet    Take 1 tablet (10 mg) by mouth 2 times daily as needed for other (Bladder Spasms)    Urinary catheter (Johnson) change required       cloNIDine 0.1 MG tablet    CATAPRES    180 tablet    TAKE ONE TABLET BY MOUTH TWICE A DAY    HTN, goal below 140/90       Cranberry 400 MG Tabs      Take 400 mg by mouth 2 times daily        cyanocobalamin 1000 MCG/ML injection    VITAMIN B12    3 mL    Inject 1 mL (1,000 mcg) into the muscle every 30 days    Iron deficiency anemia, unspecified iron deficiency anemia type       famotidine 20 MG tablet    PEPCID    60 tablet    Take 1 tablet (20 mg) by mouth 2 times daily        gabapentin 300 MG capsule    NEURONTIN    360 capsule    Take 2 capsules (600 mg) by mouth 2 times daily  "May take an additional 300mg at bedtime daily with increase pain    Diabetic polyneuropathy associated with type 2 diabetes mellitus (H)       hydrocortisone 1%/eucerin 1% compounded cream     30 g    Apply topically 2 times daily    Rash and nonspecific skin eruption       labetalol 300 MG tablet    NORMODYNE    120 tablet    Take 0.5 tablets (150 mg) by mouth 2 times daily Take 1/2 tablet (150mg) =300mg. Hold for systolic BP less than 120.    Benign essential hypertension, Kidney replaced by transplant       lactobacillus rhamnosus (GG) capsule     60 capsule    Take 1 capsule by mouth 2 times daily    Diarrhea       LANTUS SOLOSTAR 100 UNIT/ML injection   Generic drug:  insulin glargine     15 mL    Inject 22 Units Subcutaneous At Bedtime    Type 2 diabetes mellitus with diabetic polyneuropathy, with long-term current use of insulin (H)       levETIRAcetam 750 MG tablet    KEPPRA    60 tablet    Take 1 tablet (750 mg) by mouth 2 times daily        levothyroxine 50 MCG tablet    SYNTHROID/LEVOTHROID    90 tablet    TAKE ONE TABLET BY MOUTH EVERY DAY    Other specified hypothyroidism       lidocaine 2 % topical gel    XYLOCAINE    30 mL    Apply topically as needed for moderate pain 10 ML every 3 weeks    Johnson catheter in place       loratadine 10 MG tablet    CLARITIN    90 tablet    Take 1 tablet (10 mg) by mouth daily    Acute nasopharyngitis       NovoLOG FLEXPEN 100 UNIT/ML injection   Generic drug:  insulin aspart     15 mL    Inject Subcutaneous 3 times daily (with meals) Sliding scale    Type 2 diabetes mellitus with diabetic polyneuropathy, with long-term current use of insulin (H)       ondansetron 4 MG ODT tab    ZOFRAN ODT    20 tablet    Take 1 tablet (4 mg) by mouth daily as needed for nausea 30 minutes before getting up in your wheelchair    Vertigo       order for DME     1 each    Equipment being ordered: [employer] gel-T cushion 20 inches wide & 18 inches deep  height 1.676 m (5' 6\"), weight " 101.6 kg (223 lb 15.8 oz)    Back pain       order for DME     1 Box    Equipment being ordered: Tegaderm 4x4  MICHELLE 6 months Sacral wound    Decubitus ulcer of sacral region, unspecified ulcer stage       pravastatin 10 MG tablet    PRAVACHOL    90 tablet    Take 1 tablet (10 mg) by mouth daily    Hyperlipidemia LDL goal <100       predniSONE 5 MG tablet    DELTASONE    90 tablet    Take 1 tablet (5 mg) by mouth daily    Kidney replaced by transplant       simethicone 125 MG Chew chewable tablet    MYLICON    120 tablet    Take 1 tablet (125 mg) by mouth as needed for intestinal gas        * tacrolimus 1 MG capsule    GENERIC EQUIVALENT    120 capsule    Take 2 capsules (2 mg) by mouth 2 times daily Take with 0.5mg caps for total dose of 2.5mg BID    Kidney replaced by transplant       * tacrolimus 0.5 MG capsule    GENERIC EQUIVALENT     Take 1 capsule (0.5 mg) by mouth 2 times daily Take with 1mg caps for total dose of 2.5mg BID        VITAMIN D (CHOLECALCIFEROL) PO      Take 4,000 Units by mouth 2 times daily        * Notice:  This list has 2 medication(s) that are the same as other medications prescribed for you. Read the directions carefully, and ask your doctor or other care provider to review them with you.

## 2018-09-14 NOTE — MR AVS SNAPSHOT
After Visit Summary   9/14/2018    Gem Jade    MRN: [id number]           Patient Information     Date Of Birth          1945        Visit Information        Provider Department      9/14/2018 1:30 PM 1, Rodney Community Paramedic [facility]        Today's [facility]     Health Care Home    -  1       Follow-ups after your visit        Your next 10 appointments already scheduled     Sep 25, 2018  2:00 PM CDT   Telephone Visit with ANTHONY Antonio CNP   [facility] ([facility])    [address]  [address]  [facility] [address] [phone]           Note: this is not an onsite visit; there is no need to come to the facility.            Sep 25, 2018  3:30 PM CDT   TELEMEDICINE with Conchita Toledo [facility] [place] ([facility])    [address] [place] [address]  [address] [place] [address] [phone]           Note: this is not an onsite visit; there is no need to come to the facility.            Oct 10, 2018 12:00 PM CDT   Community Paramedic with Rodney [facility] Paramedic 1, Rodney Community Paramedic 2   [facility] ([facility])    [address]  [address] [place] [address] [phone]              Who to contact     If you have questions or need follow up information about today's clinic visit or your schedule please contact [facility] directly at [phone].  Normal or non-critical lab and imaging results will be communicated to you by MyChart, letter or phone within 4 business days after the clinic has received the results. If you do not hear from us within 7 days, please contact the clinic through MyChart or phone. If you have a critical or abnormal lab result, we will notify you by phone as soon as  possible.  Submit refill requests through [employer] or call your pharmacy and they will forward the refill request to us. Please allow 3 business days for your refill to be completed.          Additional Information About Your Visit        [employer] Information     [employer] gives you secure access to your electronic health record. If you see a primary care provider, you can also send messages to your care team and make appointments. If you have questions, please call your primary care clinic.  If you do not have a primary care provider, please call [phone] and they will assist you.        Care EveryWhere ID     This is your Care EveryWhere ID. This could be used by other organizations to access your [place] medical records  [id number]        Your Vitals Were     Pulse Temperature Respirations Pulse Oximetry          62 96.8  F (36  C) (Temporal) 18 98%         Blood Pressure from Last 3 Encounters:   09/17/18 152/67   09/14/18 132/76   09/14/18 132/76    Weight from Last 3 Encounters:   07/08/18 223 lb 15.8 oz (101.6 kg)   06/01/18 232 lb 2.3 oz (105.3 kg)   01/25/18 233 lb 7.5 oz (105.9 kg)              We Performed the Following     C CASE MANAGEMENT EACH 15 MINUTES          Today's Medication Changes          These changes are accurate as of 9/14/18 11:59 PM.  If you have any questions, ask your nurse or doctor.               These medicines have changed or have updated prescriptions.        Dose/Directions    hydrocortisone 1%/eucerin 1% compounded cream   This may have changed:    - when to take this  - reasons to take this   Used for:  Rash and nonspecific skin eruption        Apply topically 2 times daily   Quantity:  30 g   Refills:  1            Where to get your medicines      Some of these will need a paper prescription and others can be bought over the counter.  Ask your nurse if you have questions.     Bring a paper prescription for each of these medications     order for DME                Primary  Care Provider Office Phone # Fax #    ANTHONY Antonio [facility] [phone]       [address] [facility] [address] [place] [id number]        Goals        General    Medical (pt-stated)     Notes - Note created  7/11/2018  3:20 PM by Kassandra Khalil, RN    #1-Goal Statement: I will reduce the number of UTI's I experience  Measure of Success: reduction in number of UTI's  Supportive Steps to Achieve: home care nurse to request orders for more frequent catheter changes and increased pericare per week  Barriers: resistant UTI's  Strengths: assistance as above  Date to Achieve By: 8-15-18          Equal Access to Services     CARLOS ENRIQUE GREEN : Hadjb Goodman, waaxda luchungadaha, qaybmeaghan kaalmada jorgeda, jessica aguilar . So [facility] [phone].    ATENCIÓN: Si habla español, tiene a peguero disposición servicios gratuitos de asistencia lingüística. Llame al [phone].    We comply with applicable federal civil rights laws and [place] laws. We do not discriminate on the basis of race, color, national origin, age, disability, sex, sexual orientation, or gender identity.            Thank you!     Thank you for choosing [facility]  for your care. Our goal is always to provide you with excellent care. Hearing back from our patients is one way we can continue to improve our services. Please take a few minutes to complete the written survey that you may receive in the mail after your visit with us. Thank you!             Your Updated Medication List - Protect others around you: Learn how to safely use, store and throw away your medicines at www.disposemymeds.org.          This list is accurate as of 9/14/18 11:59 PM.  Always use your most recent med list.                   Brand Name Dispense Instructions for use Diagnosis    ACCU-CHEK COMPACT PLUS test strip   Generic drug:  blood glucose monitoring     102 each    USE TO TEST BLOOD SUGAR  THREE TIMES A DAY OR AS DIRECTED    Diabetic polyneuropathy associated with type 2 diabetes mellitus (H)       ACETAMINOPHEN PO      Take 325-650 mg by mouth every 4 hours as needed.        aspirin 81 MG tablet      Take 81 mg by mouth daily        baclofen 10 MG tablet    LIORESAL    20 tablet    Take 1 tablet (10 mg) by mouth 2 times daily as needed for other (Bladder Spasms)    Urinary catheter (Johnson) change required       cloNIDine 0.1 MG tablet    CATAPRES    180 tablet    TAKE ONE TABLET BY MOUTH TWICE A DAY    HTN, goal below 140/90       Cranberry 400 MG Tabs      Take 400 mg by mouth 2 times daily        cyanocobalamin 1000 MCG/ML injection    VITAMIN B12    3 mL    Inject 1 mL (1,000 mcg) into the muscle every 30 days    Iron deficiency anemia, unspecified iron deficiency anemia type       famotidine 20 MG tablet    PEPCID    60 tablet    Take 1 tablet (20 mg) by mouth 2 times daily        gabapentin 300 MG capsule    NEURONTIN    360 capsule    Take 2 capsules (600 mg) by mouth 2 times daily May take an additional 300mg at bedtime daily with increase pain    Diabetic polyneuropathy associated with type 2 diabetes mellitus (H)       hydrocortisone 1%/eucerin 1% compounded cream     30 g    Apply topically 2 times daily    Rash and nonspecific skin eruption       labetalol 300 MG tablet    NORMODYNE    120 tablet    Take 0.5 tablets (150 mg) by mouth 2 times daily Take 1/2 tablet (150mg) =300mg. Hold for systolic BP less than 120.    Benign essential hypertension, Kidney replaced by transplant       lactobacillus rhamnosus (GG) capsule     60 capsule    Take 1 capsule by mouth 2 times daily    Diarrhea       LANTUS SOLOSTAR 100 UNIT/ML injection   Generic drug:  insulin glargine     15 mL    Inject 22 Units Subcutaneous At Bedtime    Type 2 diabetes mellitus with diabetic polyneuropathy, with long-term current use of insulin (H)       levETIRAcetam 750 MG tablet    KEPPRA    60 tablet    Take 1 tablet (750  "mg) by mouth 2 times daily        levothyroxine 50 MCG tablet    SYNTHROID/LEVOTHROID    90 tablet    TAKE ONE TABLET BY MOUTH EVERY DAY    Other specified hypothyroidism       lidocaine 2 % topical gel    XYLOCAINE    30 mL    Apply topically as needed for moderate pain 10 ML every 3 weeks    Johnson catheter in place       loratadine 10 MG tablet    CLARITIN    90 tablet    Take 1 tablet (10 mg) by mouth daily    Acute nasopharyngitis       NovoLOG FLEXPEN 100 UNIT/ML injection   Generic drug:  insulin aspart     15 mL    Inject Subcutaneous 3 times daily (with meals) Sliding scale    Type 2 diabetes mellitus with diabetic polyneuropathy, with long-term current use of insulin (H)       ondansetron 4 MG ODT tab    ZOFRAN ODT    20 tablet    Take 1 tablet (4 mg) by mouth daily as needed for nausea 30 minutes before getting up in your wheelchair    Vertigo       order for DME     1 each    Equipment being ordered: [employer] gel-T cushion 20 inches wide & 18 inches deep  height 1.676 m (5' 6\"), weight 101.6 kg (223 lb 15.8 oz)    Back pain       order for DME     1 Box    Equipment being ordered: Tegaderm 4x4  MICHELLE 6 months Sacral wound    Decubitus ulcer of sacral region, unspecified ulcer stage       pravastatin 10 MG tablet    PRAVACHOL    90 tablet    Take 1 tablet (10 mg) by mouth daily    Hyperlipidemia LDL goal <100       predniSONE 5 MG tablet    DELTASONE    90 tablet    Take 1 tablet (5 mg) by mouth daily    Kidney replaced by transplant       simethicone 125 MG Chew chewable tablet    MYLICON    120 tablet    Take 1 tablet (125 mg) by mouth as needed for intestinal gas        * tacrolimus 1 MG capsule    GENERIC EQUIVALENT    120 capsule    Take 2 capsules (2 mg) by mouth 2 times daily Take with 0.5mg caps for total dose of 2.5mg BID    Kidney replaced by transplant       * tacrolimus 0.5 MG capsule    GENERIC EQUIVALENT     Take 1 capsule (0.5 mg) by mouth 2 times daily Take with 1mg caps for total dose of " 2.5mg BID        VITAMIN D (CHOLECALCIFEROL) PO      Take 4,000 Units by mouth 2 times daily        * Notice:  This list has 2 medication(s) that are the same as other medications prescribed for you. Read the directions carefully, and ask your doctor or other care provider to review them with you.

## 2018-09-14 NOTE — PROGRESS NOTES
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, not holding together, now using duck tape to hold the cath together with a popscile stick and no problems, reduced cath changes to every 3 weeks, reviewed labs, sodium low, possible infection waiting for final culture results, discussed restricting fluids, discussed small freq meals       Joint or Musculoskeletal Pain  Duration of complaint: shoulder pain, cath pain, foot pain, using lidocaine gel for cath changes which helps sometimes depends on how the nurses use it, shoulder pain stable, neuropathy worse helped with gabapentin but doesn't want to increase      Mental Health Symptoms       Duration: increase in confusion for 7 days now, accusing  of having an affair with the bath aide       Description: hx comes from her , irritable, memory poor, today struggles to focus, gets in a verbal fight with her    Depression: YES- wants to get outside again in the WC   Anxiety: YES- due to confusion   Sleep problems: YES  Concentration problems: no    Therapies tried and outcome: no meds      See PHQ-9 and LISSY scores, as appropriate      Abrasion on coccyx   Duration of complaint: shearing effect from sliding up on bed,  new WC cushion from [facility] arrived,  using Tegaderm pads with good results but almost out, using antifungal and Calmoseptine daily      Diabetes Follow-up       Patient is checking blood sugars: three times daily.     Blood sugar testing frequency justification: Uncontrolled diabetes, rarely eating about 1x daily small amts, drinking fluids cranberry juice 27grams of sugar,  2 glasses a day,  taking 22u of Lantus again but only once at midnight,  nothing prior to that for several days, increased  sliding scale of Novolog from 8 to 16IU         Results are as follows:       fasting-457, 535,     nonfasting - none, not eating much     Diabetic concerns: blood sugar frequently over 200     Symptoms of hypoglycemia (low blood sugar): none     Paresthesias (numbness or burning in feet) or sores: No         Hypertension Follow-up       Outpatient blood pressures are being checked at home.  Results are WNL.    Low Salt Diet: no added salt      Problem list and histories reviewed & adjusted, as indicated.  Additional history: as documented    Recent Labs   Lab Test  09/13/18   1335  09/05/18   1230   07/05/18   1418   05/29/18   0440  05/28/18   0520   05/26/18   0545  05/25/18   0900  05/25/18   0126   01/12/18   0648   12/19/17   1602   12/05/16   1210   02/05/15   1250   05/16/13   1240   A1C   --    --    --    --    --    --    --    --   8.8*   --    --    --   6.3*   --   6.7*   < >  7.4*   < >  6.9*   < >  7.2*   LDL   --    --    --    --    --    --    --    --    --    --    --    --    --    --    --    --   64   --   18   --   149*   HDL   --    --    --    --    --    --    --    --    --    --    --    --    --    --    --    --   46*   --   43*   --   42*   TRIG   --    --    --    --    --    --    --    --    --    --    --    --    --    --    --    --   272*   --   223*   --   262*   ALT   --    --    --   42   --    --   14   --    --    --   17   < >   --    < >  21   < >   --    < >   --    < >   --    CR  2.02*  1.54*   < >  1.52*   < >  1.39*  1.63*   < >  1.50*   --   1.71*   < >  1.06*   < >  1.21*   < >   --    < >  0.67   < >  0.70   GFRESTIMATED  24*  33*   < >  34*   < >  37*  31*   < >  34*   --   29*   < >  51*   < >  44*   < >   --    < >  87   < >  84   GFRESTBLACK  29*  40*   < >  41*   < >  45*  38*   < >  41*   --   36*   < >  62   < >  53*   < >   --    < >  >90   GFR Calc     < >  >90   POTASSIUM  4.7  4.0   <  >  5.2   < >  4.0  4.3   < >  5.0   --   4.8   < >  4.3   < >  4.7   < >   --    < >  4.3   < >  3.9   TSH   --    --    --    --    --   0.85   --    --    --   0.94   --    < >   --    --   1.34   < >   --    < >   --    < >   --     < > = values in this interval not displayed.      BP Readings from Last 3 Encounters:   09/04/18 (!) 122/0   08/07/18 130/72   07/08/18 161/79    Wt Readings from Last 3 Encounters:   07/08/18 223 lb 15.8 oz (101.6 kg)   06/01/18 232 lb 2.3 oz (105.3 kg)   01/25/18 233 lb 7.5 oz (105.9 kg)                  Labs reviewed in EPIC    Reviewed and updated as needed this visit by clinical staff       Reviewed and updated as needed this visit by Provider         ROS:  Constitutional, HEENT, cardiovascular, pulmonary, gi and gu systems are negative, except as otherwise noted.    OBJECTIVE:     /76  Pulse 62  SpO2 98%  There is no height or weight on file to calculate BMI.  GENERAL: alert, moderate distress, repeats herself today, struggles with memory, challenges her  over meds, states that her aide lied to her so she fired her, over weight, pale, elderly, fatigued and teary off and on   RESP: lungs clear to auscultation - no rales, rhonchi or wheezes  CV: regular rate and rhythm, normal S1 S2, no S3 or S4, no murmur, click or rub, no peripheral edema  ABDOMEN: soft, nontender,no masses and bowel sounds normal, large pannus   MS: no gross musculoskeletal defects noted, no edema, generalized weakness, can not turn herself in bed   SKIN:  erythema - buttocks and excoriation - buttocks  NEURO: weakness of LE and UE , decreased tone Abd, LE and UE and abnormal mental status - confused off and on today, memory poor, irritable   PSYCH: concentration poor, confused, tearful, anxious, fatigued and judgement and insight impaired    Diagnostic Test Results:  Results for orders placed or performed in visit on 09/05/18   Basic metabolic panel   Result Value Ref Range    Sodium 142 133 -  144 mmol/L    Potassium 4.0 3.4 - 5.3 mmol/L    Chloride 108 94 - 109 mmol/L    Carbon Dioxide 26 20 - 32 mmol/L    Anion Gap 8 3 - 14 mmol/L    Glucose 85 70 - 99 mg/dL    Urea Nitrogen 46 (H) 7 - 30 mg/dL    Creatinine 1.54 (H) 0.52 - 1.04 mg/dL    GFR Estimate 33 (L) >60 mL/min/1.7m2    GFR Estimate If Black 40 (L) >60 mL/min/1.7m2    Calcium 9.3 8.5 - 10.1 mg/dL   CBC with platelets differential   Result Value Ref Range    WBC 5.2 4.0 - 11.0 10e9/L    RBC Count 3.90 3.8 - 5.2 10e12/L    Hemoglobin 11.5 (L) 11.7 - 15.7 g/dL    Hematocrit 36.3 35.0 - 47.0 %    MCV 93 78 - 100 fl    MCH 29.5 26.5 - 33.0 pg    MCHC 31.7 31.5 - 36.5 g/dL    RDW 13.9 10.0 - 15.0 %    Platelet Count 150 150 - 450 10e9/L    Diff Method Automated Method     % Neutrophils 47.7 %    % Lymphocytes 39.3 %    % Monocytes 8.2 %    % Eosinophils 3.8 %    % Basophils 0.6 %    % Immature Granulocytes 0.4 %    Nucleated RBCs 0 0 /100    Absolute Neutrophil 2.5 1.6 - 8.3 10e9/L    Absolute Lymphocytes 2.1 0.8 - 5.3 10e9/L    Absolute Monocytes 0.4 0.0 - 1.3 10e9/L    Absolute Eosinophils 0.2 0.0 - 0.7 10e9/L    Absolute Basophils 0.0 0.0 - 0.2 10e9/L    Abs Immature Granulocytes 0.0 0 - 0.4 10e9/L    Absolute Nucleated RBC 0.0    Tacrolimus level   Result Value Ref Range    Tacrolimus Last Dose [id number] @2349     Tacrolimus Level 6.2 5.0 - 15.0 ug/L   Basic metabolic panel   Result Value Ref Range    Sodium 128 (L) 133 - 144 mmol/L    Potassium 4.7 3.4 - 5.3 mmol/L    Chloride 97 94 - 109 mmol/L    Carbon Dioxide 19 (L) 20 - 32 mmol/L    Anion Gap 12 3 - 14 mmol/L    Glucose 535 (HH) 70 - 99 mg/dL    Urea Nitrogen 60 (H) 7 - 30 mg/dL    Creatinine 2.02 (H) 0.52 - 1.04 mg/dL    GFR Estimate 24 (L) >60 mL/min/1.7m2    GFR Estimate If Black 29 (L) >60 mL/min/1.7m2    Calcium 8.8 8.5 - 10.1 mg/dL   CBC with platelets differential   Result Value Ref Range    WBC 8.2 4.0 - 11.0 10e9/L    RBC Count 3.63 (L) 3.8 - 5.2 10e12/L    Hemoglobin 10.5 (L) 11.7 -  15.7 g/dL    Hematocrit 33.3 (L) 35.0 - 47.0 %    MCV 92 78 - 100 fl    MCH 28.9 26.5 - 33.0 pg    MCHC 31.5 31.5 - 36.5 g/dL    RDW 13.5 10.0 - 15.0 %    Platelet Count 141 (L) 150 - 450 10e9/L    Diff Method Automated Method     % Neutrophils 75.9 %    % Lymphocytes 14.7 %    % Monocytes 7.2 %    % Eosinophils 0.7 %    % Basophils 0.4 %    % Immature Granulocytes 1.1 %    Nucleated RBCs 0 0 /100    Absolute Neutrophil 6.2 1.6 - 8.3 10e9/L    Absolute Lymphocytes 1.2 0.8 - 5.3 10e9/L    Absolute Monocytes 0.6 0.0 - 1.3 10e9/L    Absolute Eosinophils 0.1 0.0 - 0.7 10e9/L    Absolute Basophils 0.0 0.0 - 0.2 10e9/L    Abs Immature Granulocytes 0.1 0 - 0.4 10e9/L    Absolute Nucleated RBC 0.0    Procalcitonin   Result Value Ref Range    Procalcitonin 0.48 ng/ml   Urine Culture Aerobic Bacterial   Result Value Ref Range    Specimen Description Catheterized Urine     Special Requests Specimen received in preservative     Culture Micro 10,000 to 50,000 colonies/mL  Escherichia coli   (A)     Culture Micro (A)      10,000 to 50,000 colonies/mL  Strain 2  Escherichia coli         Susceptibility    Escherichia coli - SHEELA     AMPICILLIN 8 Sensitive ug/mL     CEFAZOLIN* <=4 Sensitive ug/mL      * Cefazolin SHEELA breakpoints are for the treatment of uncomplicated urinary tract infections.  For the treatment of systemic infections, please contact the laboratory for additional testing.     CEFOXITIN <=4 Sensitive ug/mL     CEFTAZIDIME <=1 Sensitive ug/mL     CEFTRIAXONE <=1 Sensitive ug/mL     CIPROFLOXACIN <=0.25 Sensitive ug/mL     GENTAMICIN <=1 Sensitive ug/mL     LEVOFLOXACIN <=0.12 Sensitive ug/mL     NITROFURANTOIN <=16 Sensitive ug/mL     TOBRAMYCIN <=1 Sensitive ug/mL     Trimethoprim/Sulfa <=1/19 Sensitive ug/mL     AMPICILLIN/SULBACTAM 4 Sensitive ug/mL     Piperacillin/Tazo <=4 Sensitive ug/mL     CEFEPIME <=1 Sensitive ug/mL    Escherichia coli - SHEELA     AMPICILLIN 8 Sensitive ug/mL     CEFAZOLIN* <=4 Sensitive ug/mL       * Cefazolin SHEELA breakpoints are for the treatment of uncomplicated urinary tract infections.  For the treatment of systemic infections, please contact the laboratory for additional testing.Cefazolin SHEELA breakpoints are for the treatment of uncomplicated urinary tract infections.  For the treatment of systemic infections, please contact the laboratory for additional testing.     CEFOXITIN <=4 Sensitive ug/mL     CEFTAZIDIME <=1 Sensitive ug/mL     CEFTRIAXONE <=1 Sensitive ug/mL     CIPROFLOXACIN <=0.25 Sensitive ug/mL     GENTAMICIN <=1 Sensitive ug/mL     LEVOFLOXACIN <=0.12 Sensitive ug/mL     NITROFURANTOIN <=16 Sensitive ug/mL     TOBRAMYCIN <=1 Sensitive ug/mL     Trimethoprim/Sulfa <=1/19 Sensitive ug/mL     AMPICILLIN/SULBACTAM <=2 Sensitive ug/mL     Piperacillin/Tazo <=4 Sensitive ug/mL     CEFEPIME <=1 Sensitive ug/mL     *Note: Due to a large number of results and/or encounters for the requested time period, some results have not been displayed. A complete set of results can be found in Results Review.       ASSESSMENT/PLAN:     (E87.1) Hyponatremia  (primary encounter diagnosis)  Comment: recent mental confusion, decreased intake and increase in fluids   Plan: **Basic metabolic panel FUTURE anytime        Reduce fluid intake, small freq meals, repeat labs on Monday     (L89.159) Decubitus ulcer of sacral region, unspecified ulcer stage  Comment: improved  Plan: order for DME        Continue antifungal and Calmoseptine, reordered tegaderm pads     (N39.0) Recurrent UTI  Comment: labs ordered, preliminary UC shows only lactose fermenting bacteria, WBC normal, pro calcitonin suggests possible infection, sodium low     Plan: will wait for further culture results before treating since she grows many resistant bacteria    (I10) Benign essential hypertension  Comment: stable, checked when HC visit and CP   Plan: continue current meds     (E66.01) Morbid obesity, unspecified obesity type (H)  Comment:  improved, weight loss due to diet changes   Plan: discussed getting up in her chair now that she has her cushion     (G93.41) Altered Mental Status, Probable Metabolic encephalopathy  Comment: recent for about 7 days now off and on, sugars high despite not eating, has held her lantus until last night and gave her 22IU, drinking cranberry juice and water, sodium low  Plan: possible infection waiting on UC results, restrict fluids, eat at least 2 small meals daily, call if sx worsen     (E11.3299) Diabetes mellitus with background retinopathy (H)  Comment: high sugars despite decrease in food intake   Plan: limit cranberry juice to one glass daily, eat small freq meals 2x daily, restart sliding scale as previously directed, restart Lantus but at16IU daily     Patient Instructions   Restrict fluids to no more than 1500cc daily    Eat small freq meals,     Restart sliding scale insulin and reduce Lantus to 16U daily     We will call you once we get the final UC results, call if sx worsen       ANTHON[facility]    Visit time 60   min with > than 50% of the time spent in counseling on: mental confusion, DM, wound, HTN, UTI sx

## 2018-09-14 NOTE — PATIENT INSTRUCTIONS
Restrict fluids to no more than 1500cc daily    Eat small freq meals,     Restart sliding scale insulin and reduce Lantus to 16U daily     We will call you once we get the final UC results, call if sx worsen     We will have the [facility] nurses set up your meds

## 2018-09-15 ENCOUNTER — HOSPITAL ENCOUNTER (INPATIENT)
Facility: CLINIC | Age: 73
LOS: 3 days | Discharge: HOME HEALTH | DRG: 698 | End: 2018-09-19
Attending: EMERGENCY MEDICINE | Admitting: INTERNAL MEDICINE
Payer: MEDICARE

## 2018-09-15 ENCOUNTER — APPOINTMENT (OUTPATIENT)
Dept: GENERAL RADIOLOGY | Facility: CLINIC | Age: 73
DRG: 698 | End: 2018-09-15
Attending: EMERGENCY MEDICINE
Payer: MEDICARE

## 2018-09-15 DIAGNOSIS — Z94.0 KIDNEY REPLACED BY TRANSPLANT: ICD-10-CM

## 2018-09-15 DIAGNOSIS — N39.0 UTI (URINARY TRACT INFECTION): ICD-10-CM

## 2018-09-15 DIAGNOSIS — R41.0 DELIRIUM: ICD-10-CM

## 2018-09-15 DIAGNOSIS — N39.0 URINARY TRACT INFECTION WITHOUT HEMATURIA, SITE UNSPECIFIED: ICD-10-CM

## 2018-09-15 DIAGNOSIS — R80.1 PERSISTENT PROTEINURIA: Primary | ICD-10-CM

## 2018-09-15 LAB
ALBUMIN UR-MCNC: 100 MG/DL
APPEARANCE UR: ABNORMAL
BACTERIA #/AREA URNS HPF: ABNORMAL /HPF
BASOPHILS # BLD AUTO: 0 10E9/L (ref 0–0.2)
BASOPHILS NFR BLD AUTO: 0.2 %
BILIRUB UR QL STRIP: NEGATIVE
COLOR UR AUTO: ABNORMAL
DIFFERENTIAL METHOD BLD: ABNORMAL
EOSINOPHIL # BLD AUTO: 0 10E9/L (ref 0–0.7)
EOSINOPHIL NFR BLD AUTO: 0.7 %
ERYTHROCYTE [DISTWIDTH] IN BLOOD BY AUTOMATED COUNT: 13.2 % (ref 10–15)
GLUCOSE UR STRIP-MCNC: >1000 MG/DL
HCT VFR BLD AUTO: 42 % (ref 35–47)
HGB BLD-MCNC: 13.5 G/DL (ref 11.7–15.7)
HGB UR QL STRIP: ABNORMAL
IMM GRANULOCYTES # BLD: 0.1 10E9/L (ref 0–0.4)
IMM GRANULOCYTES NFR BLD: 1.1 %
KETONES UR STRIP-MCNC: 5 MG/DL
LEUKOCYTE ESTERASE UR QL STRIP: ABNORMAL
LYMPHOCYTES # BLD AUTO: 0.7 10E9/L (ref 0.8–5.3)
LYMPHOCYTES NFR BLD AUTO: 13 %
MCH RBC QN AUTO: 29.9 PG (ref 26.5–33)
MCHC RBC AUTO-ENTMCNC: 32.1 G/DL (ref 31.5–36.5)
MCV RBC AUTO: 93 FL (ref 78–100)
MONOCYTES # BLD AUTO: 0.5 10E9/L (ref 0–1.3)
MONOCYTES NFR BLD AUTO: 9.5 %
NEUTROPHILS # BLD AUTO: 4.3 10E9/L (ref 1.6–8.3)
NEUTROPHILS NFR BLD AUTO: 75.5 %
NITRATE UR QL: POSITIVE
NRBC # BLD AUTO: 0 10*3/UL
NRBC BLD AUTO-RTO: 0 /100
PH UR STRIP: 5.5 PH (ref 5–7)
PLATELET # BLD AUTO: 187 10E9/L (ref 150–450)
RBC # BLD AUTO: 4.51 10E12/L (ref 3.8–5.2)
RBC #/AREA URNS AUTO: 9 /HPF (ref 0–2)
SOURCE: ABNORMAL
SP GR UR STRIP: 1.01 (ref 1–1.03)
UROBILINOGEN UR STRIP-MCNC: NORMAL MG/DL (ref 0–2)
WBC # BLD AUTO: 5.7 10E9/L (ref 4–11)
WBC #/AREA URNS AUTO: 286 /HPF (ref 0–5)

## 2018-09-15 PROCEDURE — 99285 EMERGENCY DEPT VISIT HI MDM: CPT | Mod: 25 | Performed by: EMERGENCY MEDICINE

## 2018-09-15 PROCEDURE — 83605 ASSAY OF LACTIC ACID: CPT | Performed by: EMERGENCY MEDICINE

## 2018-09-15 PROCEDURE — 85025 COMPLETE CBC W/AUTO DIFF WBC: CPT | Performed by: EMERGENCY MEDICINE

## 2018-09-15 PROCEDURE — 87040 BLOOD CULTURE FOR BACTERIA: CPT | Performed by: EMERGENCY MEDICINE

## 2018-09-15 PROCEDURE — 87186 SC STD MICRODIL/AGAR DIL: CPT | Performed by: EMERGENCY MEDICINE

## 2018-09-15 PROCEDURE — 81001 URINALYSIS AUTO W/SCOPE: CPT | Performed by: EMERGENCY MEDICINE

## 2018-09-15 PROCEDURE — 87800 DETECT AGNT MULT DNA DIREC: CPT | Performed by: EMERGENCY MEDICINE

## 2018-09-15 PROCEDURE — 87077 CULTURE AEROBIC IDENTIFY: CPT | Performed by: EMERGENCY MEDICINE

## 2018-09-15 PROCEDURE — 80053 COMPREHEN METABOLIC PANEL: CPT | Performed by: EMERGENCY MEDICINE

## 2018-09-15 PROCEDURE — 40000556 ZZH STATISTIC PERIPHERAL IV START W US GUIDANCE

## 2018-09-15 PROCEDURE — 71045 X-RAY EXAM CHEST 1 VIEW: CPT

## 2018-09-15 PROCEDURE — 99285 EMERGENCY DEPT VISIT HI MDM: CPT | Mod: Z6 | Performed by: EMERGENCY MEDICINE

## 2018-09-15 NOTE — IP AVS SNAPSHOT
[address] [address]    Phone:  [phone]                                       After Visit Summary   9/15/2018    Gem Jade    MRN: [id number]           After Visit Summary Signature Page     I have received my discharge instructions, and my questions have been answered. I have discussed any challenges I see with this plan with the nurse or doctor.    ..........................................................................................................................................  Patient/Patient Representative Signature      ..........................................................................................................................................  Patient Representative Print Name and Relationship to Patient    ..................................................               ................................................  Date                                   Time    ..........................................................................................................................................  Reviewed by Signature/Title    ...................................................              ..............................................  Date                                               Time          22EPIC Rev 08/18

## 2018-09-15 NOTE — IP AVS SNAPSHOT
MRN:[id number]                      After Visit Summary   9/15/2018    Gem Jade    MRN: [id number]           Thank you!     Thank you for choosing [facility] for your care. Our goal is always to provide you with excellent care. Hearing back from our patients is one way we can continue to improve our services. Please take a few minutes to complete the written survey that you may receive in the mail after you visit with us. Thank you!        Patient Information     Date Of Birth          1945        Designated Caregiver       Most Recent Value    Caregiver    Will someone help with your care after discharge? yes    Name of designated caregiver Chang-     Phone number of caregiver [phone]    Caregiver address same as pt      About your hospital stay     You were admitted on:  September 16, 2018 You last received care in the:  [address] [facility]    You were discharged on:  September 19, 2018        Reason for your hospital stay       You were hospitalized for a urinary tract infection and associated confusion and fainting. You did have an associated kidney injury, but this improved with fluids - there will be some adjustments to your medications going forward, so please review you medications closely.   In brief, your tacrolimus dose has been changed, your clonidine stopped, started losartan, and started cefdinir (antibiotic).                  Who to Call     For medical emergencies, please call 911.  For non-urgent questions about your medical care, please call your primary care provider or clinic, [phone]          Attending Provider     Provider Specialty    Scotty Franz MD Emergency Medicine    Peter, Miguelito Nuñez MD Internal Medicine    Miguelito Camarena MD Internal Medicine       Primary Care Provider Office Phone # Fax #    ANTHONY Antonio [facility] [phone]       When to contact your care team       Call nephrology if  you have any of the following: temperature greater than 100.4, symptoms suggestive of your urinary tract infections, increased shortness of breath or increased pain.                  After Care Instructions     Activity       Your activity upon discharge: activity as tolerated            Diet       Follow this diet upon discharge: regular diet prior to admission            Discharge Instructions       1. Please note the medication changes made in the AVS  2. Complete cefdinir (antibiotic) course in the next 12 days   3. Follow-up with Renal team as they recommend                  Follow-up Appointments     Adult [facility] Follow-up and recommended labs and tests       Follow up with Renal Transplant team, at (location with clinic name or city) [facility], as scheduled  to evaluate medication change and for hospital follow- up.    Appointments on [place] and/or [facility] (with [facility] or [facility] provider or service). Call [phone] if you haven't heard regarding these appointments within 7 days of discharge.                  Your next 10 appointments already scheduled     Sep 25, 2018  2:00 PM CDT   Telephone Visit with ANTHONY Antonio CNP   [facility] ([facility])    [address] [place] [address]  [address] [place] [id number] [phone]           Note: this is not an onsite visit; there is no need to come to the facility.            Sep 25, 2018  3:30 PM CDT   TELEMEDICINE with Conch[facility] ([facility])    [address] [place] [address]  [address] [place] [address] [phone]           Note: this is not an onsite visit; there is no need to come to the facility.            Oct 10, 2018 12:00 PM CDT   Community Paramedic with Rodney [facility], Rodnye [facility]   [facility] ([facility])    [address]   [address]  [facility] [id number] [phone]              Additional Services     Home care nursing referral       Resume      ___________________________________________________  [facility]  Phone: [phone]  Fax: [phone]  ___________________________________________________      RN skilled nursing visit. RN to assess vital signs and weight, respiratory and cardiac status, hydration, nutrition and bowel status and home safety.  RN to teach medication management.  RN to provide tube site care and management and lab draws.  Home health aide to assist with ADL's  RN to provide rico catheter changes per PCP orders (was changed at [facility] on 9/15/18)  ---it is recommended by Dr Camelia Johnson that it be done at least every 2 weeks      Home OT--as needed for equipment fitting/teaching of family and HHA        Your provider has ordered home care nursing services. If you have not been contacted within 2 days of your discharge please call the inpatient department phone number at [phone] .                  Further instructions from your care team       ________________________________________________________  Discharge RN please fax discharge orders to home care agency: [facility]  ________________________________________________________    Pending Results     Date and Time Order Name Status Description    9/17/2018 2330 EBV DNA PCR Quantitative Whole Blood In process     9/17/2018 0741 Blood culture Preliminary     9/17/2018 0741 Blood culture Preliminary     9/15/2018 2158 Blood culture Preliminary             Statement of Approval     Ordered          09/19/18 1422  I have reviewed and agree with all the recommendations and orders detailed in this document.  EFFECTIVE NOW     Approved and electronically signed by:  Mallory Johnson MD             Admission Information     Date & Time Provider Department Dept. Phone    9/15/2018 Miguelito Camarena MD [address] [facility]  [place] [phone]      Your Vitals Were     Blood Pressure Pulse Temperature Respirations Weight Pulse Oximetry    147/60 60 97.8  F (36.6  C) (Oral) 18 101.3 kg (223 lb 4.8 oz) 97%    BMI (Body Mass Index)                   36.04 kg/m2           [employer] Information     [employer] gives you secure access to your electronic health record. If you see a primary care provider, you can also send messages to your care team and make appointments. If you have questions, please call your primary care clinic.  If you do not have a primary care provider, please call [phone] and they will assist you.        Care EveryWhere ID     This is your Care EveryWhere ID. This could be used by other organizations to access your [facility] medical records  [id number]        Equal Access to Services     CARLOS ENRIQUE GREEN : Marques Goodman, madhav dai, marisela jimenez, jessica roman. So [facility] [phone].    ATENCIÓN: Si habla español, tiene a peguero disposición servicios gratuitos de asistencia lingüística. Llame al [phone].    We comply with applicable federal civil rights laws and [place] laws. We do not discriminate on the basis of race, color, national origin, age, disability, sex, sexual orientation, or gender identity.               Review of your medicines      START taking        Dose / Directions    cefdinir 300 MG capsule   Commonly known as:  OMNICEF   Indication:  Pyelonephritis, Urinary Tract Infection   Used for:  Urinary tract infection without hematuria, site unspecified        Dose:  300 mg   Take 1 capsule (300 mg) by mouth 2 times daily for 12 days   Quantity:  24 capsule   Refills:  0       losartan 25 MG tablet   Commonly known as:  COZAAR   Used for:  Kidney replaced by transplant, Persistent proteinuria        Dose:  12.5 mg   Start taking on:  9/20/2018   Take 0.5 tablets (12.5 mg) by mouth daily   Quantity:  30 tablet   Refills:  0         CONTINUE these  medicines which may have CHANGED, or have new prescriptions. If we are uncertain of the size of tablets/capsules you have at home, strength may be listed as something that might have changed.        Dose / Directions    hydrocortisone 1%/eucerin 1% compounded cream   This may have changed:    - when to take this  - reasons to take this   Used for:  Rash and nonspecific skin eruption        Apply topically 2 times daily   Quantity:  30 g   Refills:  1       * tacrolimus 1 MG capsule   Commonly known as:  GENERIC EQUIVALENT   This may have changed:  how much to take   Used for:  Kidney replaced by transplant        Dose:  1 mg   Take 1 capsule (1 mg) by mouth 2 times daily Take with 0.5mg caps for total dose of 2.5mg BID   Quantity:  120 capsule   Refills:  3       * tacrolimus 0.5 MG capsule   Commonly known as:  GENERIC EQUIVALENT   This may have changed:  additional instructions   Used for:  Kidney replaced by transplant        Dose:  0.5 mg   Take 1 capsule (0.5 mg) by mouth 2 times daily Take with 1mg caps for total dose of 1.5mg BID   Quantity:  30 capsule   Refills:  0       * Notice:  This list has 2 medication(s) that are the same as other medications prescribed for you. Read the directions carefully, and ask your doctor or other care provider to review them with you.      CONTINUE these medicines which have NOT CHANGED        Dose / Directions    ACCU-CHEK COMPACT PLUS test strip   Used for:  Diabetic polyneuropathy associated with type 2 diabetes mellitus (H)   Generic drug:  blood glucose monitoring        USE TO TEST BLOOD SUGAR THREE TIMES A DAY OR AS DIRECTED   Quantity:  102 each   Refills:  11       ACETAMINOPHEN PO        Dose:  325-650 mg   Take 325-650 mg by mouth every 4 hours as needed.   Refills:  0       aspirin 81 MG tablet        Dose:  81 mg   Take 81 mg by mouth daily   Refills:  0       baclofen 10 MG tablet   Commonly known as:  LIORESAL   Used for:  Urinary catheter (Johnson) change  required        Dose:  10 mg   Take 1 tablet (10 mg) by mouth 2 times daily as needed for other (Bladder Spasms)   Quantity:  20 tablet   Refills:  0       Cranberry 400 MG Tabs        Dose:  400 mg   Take 400 mg by mouth 2 times daily   Refills:  0       cyanocobalamin 1000 MCG/ML injection   Commonly known as:  VITAMIN B12   Used for:  Iron deficiency anemia, unspecified iron deficiency anemia type        Dose:  1000 mcg   Inject 1 mL (1,000 mcg) into the muscle every 30 days   Quantity:  3 mL   Refills:  3       famotidine 20 MG tablet   Commonly known as:  PEPCID        Dose:  20 mg   Take 1 tablet (20 mg) by mouth 2 times daily   Quantity:  60 tablet   Refills:  1       gabapentin 300 MG capsule   Commonly known as:  NEURONTIN   Used for:  Diabetic polyneuropathy associated with type 2 diabetes mellitus (H)        Dose:  600 mg   Take 2 capsules (600 mg) by mouth 2 times daily May take an additional 300mg at bedtime daily with increase pain   Quantity:  360 capsule   Refills:  3       labetalol 300 MG tablet   Commonly known as:  NORMODYNE   Used for:  Benign essential hypertension, Kidney replaced by transplant        Dose:  150 mg   Take 0.5 tablets (150 mg) by mouth 2 times daily Take 1/2 tablet (150mg) =300mg. Hold for systolic BP less than 120.   Quantity:  120 tablet   Refills:  3       lactobacillus rhamnosus (GG) capsule   Used for:  Diarrhea        Dose:  1 capsule   Take 1 capsule by mouth 2 times daily   Quantity:  60 capsule   Refills:  11       LANTUS SOLOSTAR 100 UNIT/ML injection   Used for:  Type 2 diabetes mellitus with diabetic polyneuropathy, with long-term current use of insulin (H)   Generic drug:  insulin glargine        Dose:  22 Units   Inject 22 Units Subcutaneous At Bedtime   Quantity:  15 mL   Refills:  11       levETIRAcetam 750 MG tablet   Commonly known as:  KEPPRA        Dose:  750 mg   Take 1 tablet (750 mg) by mouth 2 times daily   Quantity:  60 tablet   Refills:  11        "levothyroxine 50 MCG tablet   Commonly known as:  SYNTHROID/LEVOTHROID   Used for:  Other specified hypothyroidism        TAKE ONE TABLET BY MOUTH EVERY DAY   Quantity:  90 tablet   Refills:  3       lidocaine 2 % topical gel   Commonly known as:  XYLOCAINE   Used for:  Johnson catheter in place        Apply topically as needed for moderate pain 10 ML every 3 weeks   Quantity:  30 mL   Refills:  11       loratadine 10 MG tablet   Commonly known as:  CLARITIN   Used for:  Acute nasopharyngitis        Dose:  10 mg   Take 1 tablet (10 mg) by mouth daily   Quantity:  90 tablet   Refills:  3       NovoLOG FLEXPEN 100 UNIT/ML injection   Used for:  Type 2 diabetes mellitus with diabetic polyneuropathy, with long-term current use of insulin (H)   Generic drug:  insulin aspart        Inject Subcutaneous 3 times daily (with meals) Sliding scale   Quantity:  15 mL   Refills:  11       ondansetron 4 MG ODT tab   Commonly known as:  ZOFRAN ODT   Used for:  Vertigo        Dose:  4 mg   Take 1 tablet (4 mg) by mouth daily as needed for nausea 30 minutes before getting up in your wheelchair   Quantity:  20 tablet   Refills:  1       order for DME   Used for:  Back pain        Equipment being ordered: [employer] gel-T cushion 20 inches wide & 18 inches deep  height 1.676 m (5' 6\"), weight 101.6 kg (223 lb 15.8 oz)   Quantity:  1 each   Refills:  0       order for DME   Used for:  Decubitus ulcer of sacral region, unspecified ulcer stage        Equipment being ordered: Tegaderm 4x4  MICHELLE 6 months Sacral wound   Quantity:  1 Box   Refills:  3       pravastatin 10 MG tablet   Commonly known as:  PRAVACHOL   Used for:  Hyperlipidemia LDL goal <100        Dose:  10 mg   Take 1 tablet (10 mg) by mouth daily   Quantity:  90 tablet   Refills:  3       predniSONE 5 MG tablet   Commonly known as:  DELTASONE   Used for:  Kidney replaced by transplant        Dose:  5 mg   Take 1 tablet (5 mg) by mouth daily   Quantity:  90 tablet   Refills:  3 "       simethicone 125 MG Chew chewable tablet   Commonly known as:  MYLICON        Dose:  125 mg   Take 1 tablet (125 mg) by mouth as needed for intestinal gas   Quantity:  120 tablet   Refills:  0       VITAMIN D (CHOLECALCIFEROL) PO        Dose:  4000 Units   Take 4,000 Units by mouth 2 times daily   Refills:  0         STOP taking     cloNIDine 0.1 MG tablet   Commonly known as:  CATAPRES                Where to get your medicines      These medications were sent to [facility] - [place], [place] - [address] [address]     Phone:  [phone]     cefdinir 300 MG capsule    losartan 25 MG tablet    tacrolimus 0.5 MG capsule    tacrolimus 1 MG capsule                Protect others around you: Learn how to safely use, store and throw away your medicines at www.disposemymeds.org.        ANTIBIOTIC INSTRUCTION     You've Been Prescribed an Antibiotic - Now What?  Your healthcare team thinks that you or your loved one might have an infection. Some infections can be treated with antibiotics, which are powerful, life-saving drugs. Like all medications, antibiotics have side effects and should only be used when necessary. There are some important things you should know about your antibiotic treatment.      Your healthcare team may run tests before you start taking an antibiotic.    Your team may take samples (e.g., from your blood, urine or other areas) to run tests to look for bacteria. These test can be important to determine if you need an antibiotic at all and, if you do, which antibiotic will work best.      Within a few days, your healthcare team might change or even stop your antibiotic.    Your team may start you on an antibiotic while they are working to find out what is making you sick.    Your team might change your antibiotic because test results show that a different antibiotic would be better to treat your infection.    In some cases, once your team  has more information, they learn that you do not need an antibiotic at all. They may find out that you don't have an infection, or that the antibiotic you're taking won't work against your infection. For example, an infection caused by a virus can't be treated with antibiotics. Staying on an antibiotic when you don't need it is more likely to be harmful than helpful.      You may experience side effects from your antibiotic.    Like all medications, antibiotics have side effects. Some of these can be serious.    Let you healthcare team know if you have any known allergies when you are admitted to the hospital.    One significant side effect of nearly all antibiotics is the risk of severe and sometimes deadly diarrhea caused by Clostridium difficile (C. Difficile). This occurs when a person takes antibiotics because some good germs are destroyed. Antibiotic use allows C. diificile to take over, putting patients at high risk for this serious infection.    As a patient or caregiver, it is important to understand your or your loved one's antibiotic treatment. It is especially important for caregivers to speak up when patients can't speak for themselves. Here are some important questions to ask your healthcare team.    What infection is this antibiotic treating and how do you know I have that infection?    What side effects might occur from this antibiotic?    How long will I need to take this antibiotic?    Is it safe to take this antibiotic with other medications or supplements (e.g., vitamins) that I am taking?     Are there any special directions I need to know about taking this antibiotic? For example, should I take it with food?    How will I be monitored to know whether my infection is responding to the antibiotic?    What tests may help to make sure the right antibiotic is prescribed for me?      Information provided by:  www.cdc.gov/getsmart  U.S. Department of Health and Human Services  Centers for disease  Control and Prevention  National Center for Emerging and Zoonotic Infectious Diseases  Division of Healthcare Quality Promotion             Medication List: This is a list of all your medications and when to take them. Check marks below indicate your daily home schedule. Keep this list as a reference.      Medications           Morning Afternoon Evening Bedtime As Needed    ACCU-CHEK COMPACT PLUS test strip   USE TO TEST BLOOD SUGAR THREE TIMES A DAY OR AS DIRECTED   Generic drug:  blood glucose monitoring                                ACETAMINOPHEN PO   Take 325-650 mg by mouth every 4 hours as needed.                                aspirin 81 MG tablet   Take 81 mg by mouth daily                                baclofen 10 MG tablet   Commonly known as:  LIORESAL   Take 1 tablet (10 mg) by mouth 2 times daily as needed for other (Bladder Spasms)                                cefdinir 300 MG capsule   Commonly known as:  OMNICEF   Take 1 capsule (300 mg) by mouth 2 times daily for 12 days   Last time this was given:  300 mg on 9/19/2018  8:31 AM                                Cranberry 400 MG Tabs   Take 400 mg by mouth 2 times daily                                cyanocobalamin 1000 MCG/ML injection   Commonly known as:  VITAMIN B12   Inject 1 mL (1,000 mcg) into the muscle every 30 days                                famotidine 20 MG tablet   Commonly known as:  PEPCID   Take 1 tablet (20 mg) by mouth 2 times daily   Last time this was given:  20 mg on 9/19/2018  8:30 AM                                gabapentin 300 MG capsule   Commonly known as:  NEURONTIN   Take 2 capsules (600 mg) by mouth 2 times daily May take an additional 300mg at bedtime daily with increase pain   Last time this was given:  600 mg on 9/19/2018  8:29 AM                                hydrocortisone 1%/eucerin 1% compounded cream   Apply topically 2 times daily                                labetalol 300 MG tablet   Commonly known as:   "NORMODYNE   Take 0.5 tablets (150 mg) by mouth 2 times daily Take 1/2 tablet (150mg) =300mg. Hold for systolic BP less than 120.   Last time this was given:  150 mg on 9/19/2018  8:30 AM                                lactobacillus rhamnosus (GG) capsule   Take 1 capsule by mouth 2 times daily                                LANTUS SOLOSTAR 100 UNIT/ML injection   Inject 22 Units Subcutaneous At Bedtime   Last time this was given:  18 Units on 9/18/2018  9:58 PM   Generic drug:  insulin glargine                                levETIRAcetam 750 MG tablet   Commonly known as:  KEPPRA   Take 1 tablet (750 mg) by mouth 2 times daily   Last time this was given:  750 mg on 9/19/2018  8:30 AM                                levothyroxine 50 MCG tablet   Commonly known as:  SYNTHROID/LEVOTHROID   TAKE ONE TABLET BY MOUTH EVERY DAY   Last time this was given:  50 mcg on 9/19/2018  8:30 AM                                lidocaine 2 % topical gel   Commonly known as:  XYLOCAINE   Apply topically as needed for moderate pain 10 ML every 3 weeks                                loratadine 10 MG tablet   Commonly known as:  CLARITIN   Take 1 tablet (10 mg) by mouth daily                                losartan 25 MG tablet   Commonly known as:  COZAAR   Take 0.5 tablets (12.5 mg) by mouth daily   Start taking on:  9/20/2018                                NovoLOG FLEXPEN 100 UNIT/ML injection   Inject Subcutaneous 3 times daily (with meals) Sliding scale   Last time this was given:  2 Units on 9/19/2018  4:10 AM   Generic drug:  insulin aspart                                ondansetron 4 MG ODT tab   Commonly known as:  ZOFRAN ODT   Take 1 tablet (4 mg) by mouth daily as needed for nausea 30 minutes before getting up in your wheelchair                                order for DME   Equipment being ordered: [employer] gel-T cushion 20 inches wide & 18 inches deep  height 1.676 m (5' 6\"), weight 101.6 kg (223 lb 15.8 oz)              "                   order for DME   Equipment being ordered: Tegaderm 4x4  MICHELLE 6 months Sacral wound                                pravastatin 10 MG tablet   Commonly known as:  PRAVACHOL   Take 1 tablet (10 mg) by mouth daily                                predniSONE 5 MG tablet   Commonly known as:  DELTASONE   Take 1 tablet (5 mg) by mouth daily   Last time this was given:  5 mg on 9/19/2018  8:29 AM                                simethicone 125 MG Chew chewable tablet   Commonly known as:  MYLICON   Take 1 tablet (125 mg) by mouth as needed for intestinal gas                                * tacrolimus 1 MG capsule   Commonly known as:  GENERIC EQUIVALENT   Take 1 capsule (1 mg) by mouth 2 times daily Take with 0.5mg caps for total dose of 2.5mg BID   Last time this was given:  1.5 mg on 9/19/2018  8:29 AM                                * tacrolimus 0.5 MG capsule   Commonly known as:  GENERIC EQUIVALENT   Take 1 capsule (0.5 mg) by mouth 2 times daily Take with 1mg caps for total dose of 1.5mg BID   Last time this was given:  1.5 mg on 9/19/2018  8:29 AM                                VITAMIN D (CHOLECALCIFEROL) PO   Take 4,000 Units by mouth 2 times daily                                * Notice:  This list has 2 medication(s) that are the same as other medications prescribed for you. Read the directions carefully, and ask your doctor or other care provider to review them with you.

## 2018-09-15 NOTE — LETTER
Transition Communication Hand-off for Care Transitions to Next Level of Care Provider    Name: Gem Jade  : 1945  MRN #: [id number]  Primary Care Provider: Marivel Barcenas NP     Primary Clinic: [address] [facility] [address] [place] [id number]     Reason for Hospitalization:  Delirium [R41.0]  UTI (urinary tract infection) [N39.0]  Admit Date/Time: 9/15/2018  9:44 PM  Discharge Date: 18  Payor Source: Payor: MEDICA / Plan: MEDICA PRIME SOLUTION / Product Type: Indemnity /              Reason for Communication Hand-off Referral: Other UTI    Discharge Plan: Home with resumption of [facility]       Concern for non-adherence with plan of care:   Y/N N  Discharge Needs Assessment:  Needs       Most Recent Value    Equipment Currently Used at Home glucometer [bedpan]    Transportation Available -- [facility]]    Home Care [facility] [phone], Fax: [phone]            Follow-up specialty is recommended: Yes    Follow-up plan:  Future Appointments  Date Time Provider Department Center   2018 6:00 AM Conchita Nichols, [facility]   2018 2:00 PM Marivel Barcneas, ANTHONY CNP [facility]   2018 3:30 PM Conchita Toledo, [facility]   10/10/2018 12:00 PM 1, [facility]       Any outstanding tests or procedures:        Referrals     Future Labs/Procedures    Home care nursing referral     Comments:    Resume      ___________________________________________________  [facility]  Phone: [phone]  Fax: [phone]  ___________________________________________________      RN skilled nursing visit. RN to assess vital signs and weight, respiratory and cardiac status, hydration, nutrition and bowel status and home safety.  RN to teach medication management.  RN to provide tube site care and management and lab draws.  Home health aide to assist with ADL's  RN to provide rico catheter changes per PCP  orders (was changed at [facility] on 9/15/18)  ---it is recommended by Dr Camelia Johnson that it be done at least every 2 weeks      Home OT--as needed for equipment fitting/teaching of family and HHA        Your provider has ordered home care nursing services. If you have not been contacted within 2 days of your discharge please call the inpatient department phone number at [phone] .            Feliciano Recommendations:      Conchita Alonso    AVS/Discharge Summary is the source of truth; this is a helpful guide for improved communication of patient story

## 2018-09-16 ENCOUNTER — APPOINTMENT (OUTPATIENT)
Dept: CT IMAGING | Facility: CLINIC | Age: 73
DRG: 698 | End: 2018-09-16
Attending: STUDENT IN AN ORGANIZED HEALTH CARE EDUCATION/TRAINING PROGRAM
Payer: MEDICARE

## 2018-09-16 ENCOUNTER — APPOINTMENT (OUTPATIENT)
Dept: ULTRASOUND IMAGING | Facility: CLINIC | Age: 73
DRG: 698 | End: 2018-09-16
Attending: INTERNAL MEDICINE
Payer: MEDICARE

## 2018-09-16 LAB
ALBUMIN SERPL-MCNC: 2.8 G/DL (ref 3.4–5)
ALBUMIN SERPL-MCNC: 3 G/DL (ref 3.4–5)
ALP SERPL-CCNC: 71 U/L (ref 40–150)
ALT SERPL W P-5'-P-CCNC: 10 U/L (ref 0–50)
ANION GAP SERPL CALCULATED.3IONS-SCNC: 10 MMOL/L (ref 3–14)
ANION GAP SERPL CALCULATED.3IONS-SCNC: 9 MMOL/L (ref 3–14)
AST SERPL W P-5'-P-CCNC: 5 U/L (ref 0–45)
BACTERIA SPEC CULT: ABNORMAL
BACTERIA SPEC CULT: ABNORMAL
BILIRUB SERPL-MCNC: 0.3 MG/DL (ref 0.2–1.3)
BUN SERPL-MCNC: 60 MG/DL (ref 7–30)
BUN SERPL-MCNC: 62 MG/DL (ref 7–30)
CALCIUM SERPL-MCNC: 9 MG/DL (ref 8.5–10.1)
CALCIUM SERPL-MCNC: 9.4 MG/DL (ref 8.5–10.1)
CHLORIDE SERPL-SCNC: 102 MMOL/L (ref 94–109)
CHLORIDE SERPL-SCNC: 107 MMOL/L (ref 94–109)
CO2 SERPL-SCNC: 21 MMOL/L (ref 20–32)
CO2 SERPL-SCNC: 24 MMOL/L (ref 20–32)
CREAT SERPL-MCNC: 1.88 MG/DL (ref 0.52–1.04)
CREAT SERPL-MCNC: 2.1 MG/DL (ref 0.52–1.04)
ERYTHROCYTE [DISTWIDTH] IN BLOOD BY AUTOMATED COUNT: 13.3 % (ref 10–15)
GFR SERPL CREATININE-BSD FRML MDRD: 23 ML/MIN/1.7M2
GFR SERPL CREATININE-BSD FRML MDRD: 26 ML/MIN/1.7M2
GLUCOSE BLDC GLUCOMTR-MCNC: 281 MG/DL (ref 70–99)
GLUCOSE BLDC GLUCOMTR-MCNC: 285 MG/DL (ref 70–99)
GLUCOSE BLDC GLUCOMTR-MCNC: 287 MG/DL (ref 70–99)
GLUCOSE BLDC GLUCOMTR-MCNC: 307 MG/DL (ref 70–99)
GLUCOSE BLDC GLUCOMTR-MCNC: 312 MG/DL (ref 70–99)
GLUCOSE BLDC GLUCOMTR-MCNC: 319 MG/DL (ref 70–99)
GLUCOSE BLDC GLUCOMTR-MCNC: 339 MG/DL (ref 70–99)
GLUCOSE SERPL-MCNC: 308 MG/DL (ref 70–99)
GLUCOSE SERPL-MCNC: 411 MG/DL (ref 70–99)
HBA1C MFR BLD: 8.2 % (ref 0–5.6)
HCT VFR BLD AUTO: 36 % (ref 35–47)
HGB BLD-MCNC: 11.4 G/DL (ref 11.7–15.7)
LACTATE BLD-SCNC: 1.2 MMOL/L (ref 0.7–2)
Lab: ABNORMAL
MCH RBC QN AUTO: 29.3 PG (ref 26.5–33)
MCHC RBC AUTO-ENTMCNC: 31.7 G/DL (ref 31.5–36.5)
MCV RBC AUTO: 93 FL (ref 78–100)
PHOSPHATE SERPL-MCNC: 2.7 MG/DL (ref 2.5–4.5)
PLATELET # BLD AUTO: 161 10E9/L (ref 150–450)
POTASSIUM SERPL-SCNC: 4 MMOL/L (ref 3.4–5.3)
POTASSIUM SERPL-SCNC: 4.2 MMOL/L (ref 3.4–5.3)
PROT SERPL-MCNC: 8.3 G/DL (ref 6.8–8.8)
RBC # BLD AUTO: 3.89 10E12/L (ref 3.8–5.2)
SODIUM SERPL-SCNC: 135 MMOL/L (ref 133–144)
SODIUM SERPL-SCNC: 138 MMOL/L (ref 133–144)
SPECIMEN SOURCE: ABNORMAL
TACROLIMUS BLD-MCNC: 11.1 UG/L (ref 5–15)
TME LAST DOSE: NORMAL H
WBC # BLD AUTO: 6.4 10E9/L (ref 4–11)

## 2018-09-16 PROCEDURE — 36415 COLL VENOUS BLD VENIPUNCTURE: CPT | Performed by: EMERGENCY MEDICINE

## 2018-09-16 PROCEDURE — 25000132 ZZH RX MED GY IP 250 OP 250 PS 637: Mod: GY | Performed by: STUDENT IN AN ORGANIZED HEALTH CARE EDUCATION/TRAINING PROGRAM

## 2018-09-16 PROCEDURE — 96361 HYDRATE IV INFUSION ADD-ON: CPT | Performed by: EMERGENCY MEDICINE

## 2018-09-16 PROCEDURE — 00000146 ZZHCL STATISTIC GLUCOSE BY METER IP

## 2018-09-16 PROCEDURE — 96372 THER/PROPH/DIAG INJ SC/IM: CPT | Performed by: EMERGENCY MEDICINE

## 2018-09-16 PROCEDURE — 25000128 H RX IP 250 OP 636: Performed by: INTERNAL MEDICINE

## 2018-09-16 PROCEDURE — 96365 THER/PROPH/DIAG IV INF INIT: CPT | Performed by: EMERGENCY MEDICINE

## 2018-09-16 PROCEDURE — 87186 SC STD MICRODIL/AGAR DIL: CPT | Performed by: EMERGENCY MEDICINE

## 2018-09-16 PROCEDURE — 25000128 H RX IP 250 OP 636: Performed by: EMERGENCY MEDICINE

## 2018-09-16 PROCEDURE — 25000131 ZZH RX MED GY IP 250 OP 636 PS 637: Mod: GY | Performed by: STUDENT IN AN ORGANIZED HEALTH CARE EDUCATION/TRAINING PROGRAM

## 2018-09-16 PROCEDURE — A9270 NON-COVERED ITEM OR SERVICE: HCPCS | Mod: GY | Performed by: STUDENT IN AN ORGANIZED HEALTH CARE EDUCATION/TRAINING PROGRAM

## 2018-09-16 PROCEDURE — 25000128 H RX IP 250 OP 636: Performed by: STUDENT IN AN ORGANIZED HEALTH CARE EDUCATION/TRAINING PROGRAM

## 2018-09-16 PROCEDURE — 80069 RENAL FUNCTION PANEL: CPT | Performed by: STUDENT IN AN ORGANIZED HEALTH CARE EDUCATION/TRAINING PROGRAM

## 2018-09-16 PROCEDURE — 36415 COLL VENOUS BLD VENIPUNCTURE: CPT | Performed by: STUDENT IN AN ORGANIZED HEALTH CARE EDUCATION/TRAINING PROGRAM

## 2018-09-16 PROCEDURE — 87086 URINE CULTURE/COLONY COUNT: CPT | Performed by: EMERGENCY MEDICINE

## 2018-09-16 PROCEDURE — 12000025 ZZH R&B TRANSPLANT INTERMEDIATE

## 2018-09-16 PROCEDURE — 80197 ASSAY OF TACROLIMUS: CPT | Performed by: INTERNAL MEDICINE

## 2018-09-16 PROCEDURE — 83036 HEMOGLOBIN GLYCOSYLATED A1C: CPT | Performed by: STUDENT IN AN ORGANIZED HEALTH CARE EDUCATION/TRAINING PROGRAM

## 2018-09-16 PROCEDURE — 25000128 H RX IP 250 OP 636: Performed by: NURSE PRACTITIONER

## 2018-09-16 PROCEDURE — 25000125 ZZHC RX 250: Performed by: STUDENT IN AN ORGANIZED HEALTH CARE EDUCATION/TRAINING PROGRAM

## 2018-09-16 PROCEDURE — 87088 URINE BACTERIA CULTURE: CPT | Performed by: EMERGENCY MEDICINE

## 2018-09-16 PROCEDURE — 76776 US EXAM K TRANSPL W/DOPPLER: CPT

## 2018-09-16 PROCEDURE — 99223 1ST HOSP IP/OBS HIGH 75: CPT | Mod: AI | Performed by: INTERNAL MEDICINE

## 2018-09-16 PROCEDURE — 85027 COMPLETE CBC AUTOMATED: CPT | Performed by: STUDENT IN AN ORGANIZED HEALTH CARE EDUCATION/TRAINING PROGRAM

## 2018-09-16 PROCEDURE — 70450 CT HEAD/BRAIN W/O DYE: CPT

## 2018-09-16 RX ORDER — LEVETIRACETAM 750 MG/1
750 TABLET ORAL 2 TIMES DAILY
Status: DISCONTINUED | OUTPATIENT
Start: 2018-09-16 | End: 2018-09-19 | Stop reason: HOSPADM

## 2018-09-16 RX ORDER — LABETALOL HYDROCHLORIDE 300 MG/1
150 TABLET, FILM COATED ORAL 2 TIMES DAILY
Status: DISCONTINUED | OUTPATIENT
Start: 2018-09-16 | End: 2018-09-19 | Stop reason: HOSPADM

## 2018-09-16 RX ORDER — TACROLIMUS 0.5 MG/1
0.5 CAPSULE ORAL 2 TIMES DAILY
Status: DISCONTINUED | OUTPATIENT
Start: 2018-09-16 | End: 2018-09-17 | Stop reason: CLARIF

## 2018-09-16 RX ORDER — CLONIDINE HYDROCHLORIDE 0.1 MG/1
0.1 TABLET ORAL ONCE
Status: COMPLETED | OUTPATIENT
Start: 2018-09-16 | End: 2018-09-16

## 2018-09-16 RX ORDER — CLONIDINE HYDROCHLORIDE 0.1 MG/1
0.1 TABLET ORAL 2 TIMES DAILY
Status: DISCONTINUED | OUTPATIENT
Start: 2018-09-16 | End: 2018-09-19

## 2018-09-16 RX ORDER — HYDRALAZINE HYDROCHLORIDE 20 MG/ML
10 INJECTION INTRAMUSCULAR; INTRAVENOUS EVERY 6 HOURS PRN
Status: DISCONTINUED | OUTPATIENT
Start: 2018-09-16 | End: 2018-09-19 | Stop reason: HOSPADM

## 2018-09-16 RX ORDER — NICOTINE POLACRILEX 4 MG
15-30 LOZENGE BUCCAL
Status: DISCONTINUED | OUTPATIENT
Start: 2018-09-16 | End: 2018-09-19 | Stop reason: HOSPADM

## 2018-09-16 RX ORDER — PREDNISONE 5 MG/1
5 TABLET ORAL DAILY
Status: DISCONTINUED | OUTPATIENT
Start: 2018-09-16 | End: 2018-09-19 | Stop reason: HOSPADM

## 2018-09-16 RX ORDER — NALOXONE HYDROCHLORIDE 0.4 MG/ML
.1-.4 INJECTION, SOLUTION INTRAMUSCULAR; INTRAVENOUS; SUBCUTANEOUS
Status: DISCONTINUED | OUTPATIENT
Start: 2018-09-16 | End: 2018-09-19 | Stop reason: HOSPADM

## 2018-09-16 RX ORDER — SODIUM CHLORIDE, SODIUM LACTATE, POTASSIUM CHLORIDE, CALCIUM CHLORIDE 600; 310; 30; 20 MG/100ML; MG/100ML; MG/100ML; MG/100ML
INJECTION, SOLUTION INTRAVENOUS CONTINUOUS
Status: ACTIVE | OUTPATIENT
Start: 2018-09-16 | End: 2018-09-16

## 2018-09-16 RX ORDER — SODIUM CHLORIDE, SODIUM LACTATE, POTASSIUM CHLORIDE, CALCIUM CHLORIDE 600; 310; 30; 20 MG/100ML; MG/100ML; MG/100ML; MG/100ML
INJECTION, SOLUTION INTRAVENOUS CONTINUOUS
Status: DISCONTINUED | OUTPATIENT
Start: 2018-09-16 | End: 2018-09-17

## 2018-09-16 RX ORDER — SODIUM CHLORIDE, SODIUM LACTATE, POTASSIUM CHLORIDE, CALCIUM CHLORIDE 600; 310; 30; 20 MG/100ML; MG/100ML; MG/100ML; MG/100ML
INJECTION, SOLUTION INTRAVENOUS CONTINUOUS
Status: DISCONTINUED | OUTPATIENT
Start: 2018-09-16 | End: 2018-09-16

## 2018-09-16 RX ORDER — ONDANSETRON 4 MG/1
4 TABLET, ORALLY DISINTEGRATING ORAL EVERY 6 HOURS PRN
Status: DISCONTINUED | OUTPATIENT
Start: 2018-09-16 | End: 2018-09-19 | Stop reason: HOSPADM

## 2018-09-16 RX ORDER — FAMOTIDINE 20 MG/1
20 TABLET, FILM COATED ORAL DAILY
Status: DISCONTINUED | OUTPATIENT
Start: 2018-09-16 | End: 2018-09-19 | Stop reason: HOSPADM

## 2018-09-16 RX ORDER — LEVOTHYROXINE SODIUM 50 UG/1
50 TABLET ORAL DAILY
Status: DISCONTINUED | OUTPATIENT
Start: 2018-09-16 | End: 2018-09-19 | Stop reason: HOSPADM

## 2018-09-16 RX ORDER — ONDANSETRON 2 MG/ML
4 INJECTION INTRAMUSCULAR; INTRAVENOUS EVERY 6 HOURS PRN
Status: DISCONTINUED | OUTPATIENT
Start: 2018-09-16 | End: 2018-09-19 | Stop reason: HOSPADM

## 2018-09-16 RX ORDER — TACROLIMUS 1 MG/1
2 CAPSULE ORAL 2 TIMES DAILY
Status: DISCONTINUED | OUTPATIENT
Start: 2018-09-16 | End: 2018-09-17

## 2018-09-16 RX ORDER — DEXTROSE MONOHYDRATE 25 G/50ML
25-50 INJECTION, SOLUTION INTRAVENOUS
Status: DISCONTINUED | OUTPATIENT
Start: 2018-09-16 | End: 2018-09-19 | Stop reason: HOSPADM

## 2018-09-16 RX ORDER — HYDRALAZINE HYDROCHLORIDE 20 MG/ML
10 INJECTION INTRAMUSCULAR; INTRAVENOUS ONCE
Status: DISCONTINUED | OUTPATIENT
Start: 2018-09-16 | End: 2018-09-16

## 2018-09-16 RX ADMIN — CLONIDINE HYDROCHLORIDE 0.1 MG: 0.1 TABLET ORAL at 02:03

## 2018-09-16 RX ADMIN — INSULIN ASPART 4 UNITS: 100 INJECTION, SOLUTION INTRAVENOUS; SUBCUTANEOUS at 03:10

## 2018-09-16 RX ADMIN — TACROLIMUS 2 MG: 1 CAPSULE ORAL at 19:35

## 2018-09-16 RX ADMIN — INSULIN ASPART 3 UNITS: 100 INJECTION, SOLUTION INTRAVENOUS; SUBCUTANEOUS at 08:48

## 2018-09-16 RX ADMIN — INSULIN ASPART 3 UNITS: 100 INJECTION, SOLUTION INTRAVENOUS; SUBCUTANEOUS at 13:24

## 2018-09-16 RX ADMIN — LEVOTHYROXINE SODIUM 50 MCG: 50 TABLET ORAL at 09:53

## 2018-09-16 RX ADMIN — Medication 150 MG: at 20:02

## 2018-09-16 RX ADMIN — CLONIDINE HYDROCHLORIDE 0.1 MG: 0.1 TABLET ORAL at 19:35

## 2018-09-16 RX ADMIN — PREDNISONE 5 MG: 5 TABLET ORAL at 09:47

## 2018-09-16 RX ADMIN — LEVETIRACETAM 750 MG: 750 TABLET, FILM COATED ORAL at 19:35

## 2018-09-16 RX ADMIN — SODIUM CHLORIDE, POTASSIUM CHLORIDE, SODIUM LACTATE AND CALCIUM CHLORIDE: 600; 310; 30; 20 INJECTION, SOLUTION INTRAVENOUS at 05:57

## 2018-09-16 RX ADMIN — INSULIN ASPART 4 UNITS: 100 INJECTION, SOLUTION INTRAVENOUS; SUBCUTANEOUS at 17:35

## 2018-09-16 RX ADMIN — SODIUM CHLORIDE, POTASSIUM CHLORIDE, SODIUM LACTATE AND CALCIUM CHLORIDE 1000 ML: 600; 310; 30; 20 INJECTION, SOLUTION INTRAVENOUS at 03:57

## 2018-09-16 RX ADMIN — INSULIN GLARGINE 18 UNITS: 100 INJECTION, SOLUTION SUBCUTANEOUS at 21:58

## 2018-09-16 RX ADMIN — TACROLIMUS 2 MG: 1 CAPSULE ORAL at 09:51

## 2018-09-16 RX ADMIN — VANCOMYCIN HYDROCHLORIDE 2000 MG: 1 INJECTION, POWDER, LYOPHILIZED, FOR SOLUTION INTRAVENOUS at 20:15

## 2018-09-16 RX ADMIN — LEVETIRACETAM 750 MG: 750 TABLET, FILM COATED ORAL at 09:57

## 2018-09-16 RX ADMIN — HYDRALAZINE HYDROCHLORIDE 10 MG: 20 INJECTION INTRAMUSCULAR; INTRAVENOUS at 17:01

## 2018-09-16 RX ADMIN — INSULIN GLARGINE 18 UNITS: 100 INJECTION, SOLUTION SUBCUTANEOUS at 03:11

## 2018-09-16 RX ADMIN — SODIUM CHLORIDE, POTASSIUM CHLORIDE, SODIUM LACTATE AND CALCIUM CHLORIDE: 600; 310; 30; 20 INJECTION, SOLUTION INTRAVENOUS at 22:18

## 2018-09-16 RX ADMIN — INSULIN ASPART 3 UNITS: 100 INJECTION, SOLUTION INTRAVENOUS; SUBCUTANEOUS at 21:58

## 2018-09-16 RX ADMIN — TACROLIMUS 0.5 MG: 0.5 CAPSULE ORAL at 09:53

## 2018-09-16 RX ADMIN — Medication 150 MG: at 09:54

## 2018-09-16 RX ADMIN — FAMOTIDINE 20 MG: 20 TABLET, FILM COATED ORAL at 09:56

## 2018-09-16 RX ADMIN — CEFEPIME HYDROCHLORIDE 2 G: 2 INJECTION, POWDER, FOR SOLUTION INTRAVENOUS at 00:03

## 2018-09-16 RX ADMIN — CLONIDINE HYDROCHLORIDE 0.1 MG: 0.1 TABLET ORAL at 09:47

## 2018-09-16 RX ADMIN — TACROLIMUS 0.5 MG: 0.5 CAPSULE ORAL at 19:35

## 2018-09-16 ASSESSMENT — ACTIVITIES OF DAILY LIVING (ADL)
SWALLOWING: 0-->SWALLOWS FOODS/LIQUIDS WITHOUT DIFFICULTY
FALL_HISTORY_WITHIN_LAST_SIX_MONTHS: NO
WHICH_OF_THE_ABOVE_FUNCTIONAL_RISKS_HAD_A_RECENT_ONSET_OR_CHANGE?: COGNITION
BATHING: 4-->COMPLETELY DEPENDENT
RETIRED_EATING: 0-->INDEPENDENT
RETIRED_COMMUNICATION: 2-->DIFFICULTY UNDERSTANDING (NOT RELATED TO LANGUAGE BARRIER)
ADLS_ACUITY_SCORE: 27
DRESS: 2-->ASSISTIVE PERSON
COGNITION: 0 - NO COGNITION ISSUES REPORTED
TOILETING: 3-->ASSISTIVE EQUIPMENT AND PERSON
TRANSFERRING: 4-->COMPLETELY DEPENDENT
AMBULATION: 4-->COMPLETELY DEPENDENT

## 2018-09-16 NOTE — ED NOTES
[facility], [place]   ED Nurse to Floor Handoff     Gem Jade is a 72 year old female who speaks English and lives with family members,  in a home  They arrived in the ED by ambulance from home    ED Chief Complaint: Altered Mental Status    ED Dx;   Final diagnoses:   UTI (urinary tract infection)   Delirium         Needed?: No    Allergies:   Allergies   Allergen Reactions     Bactrim Ds [Sulfamethoxazole W/Trimethoprim]      Creatinine level increased     Atorvastatin Calcium      myalgias, muscle aches     Codeine Nausea and Vomiting     Darvocet [Propoxyphene N-Apap] Nausea and Vomiting     Hydromorphone      Levofloxacin Other (See Comments) and Diarrhea     hallucinations     Morphine      Nausea and vomiting     Niaspan [Niacin] GI Disturbance     Flushing even at low dose, GI upset     Percocet [Oxycodone-Acetaminophen]      Vomiting after taking med couple of times     Vicodin [Hydrocodone-Acetaminophen] Nausea and Vomiting   .  Past Medical Hx:   Past Medical History:   Diagnosis Date     Background diabetic retinopathy(362.01)     extensive diabetic retinopathy, s/p multiple laser treatments     Diabetic gastroparesis (H)     followed by [facility] (Dr. 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)      Urinary tract infection, site not specified     Chronic UTIs     UTI (urinary tract infection) due to urinary indwelling catheter (H)       Baseline Mental status: other non-verbal  Current Mental  Status changes: other lethargic, non-verbal    Infection present or suspected this encounter: yes urinary  Sepsis suspected: Yes  Isolation type: Contact     Activity level - Baseline/Home:  Total Care  Activity Level - Current:   Total Care    Bariatric equipment needed?: No    In the ED these meds were given:   Medications   insulin glargine (LANTUS) injection 18 Units (not administered)   insulin aspart (NovoLOG) inj (RAPID ACTING) (not administered)   ceFEPIme (MAXIPIME) 2 g vial to attach to  ml bag for ADULTS or 50 ml bag for PEDS (0 g Intravenous Stopped 9/16/18 0033)   cloNIDine (CATAPRES) tablet 0.1 mg (0.1 mg Oral Given 9/16/18 0203)       Drips running?  No    Home pump  No    Current LDAs  Peripheral IV 09/15/18 Right  (Active)   Number of days:1       Peripheral IV 09/15/18 Left  (Active)   Number of days:1       Urethral Catheter Double-lumen 16 fr (Active)   Number of days:248       Urethral Catheter 18 fr (Active)   Number of days:112       Urethral Catheter Temperature probe (Active)   Number of days:73       Wound 07/05/17 Posterior Buttocks Skin tear excoriation (Active)   Number of days:438       Wound 12/20/17 Left Groin Skin tear;Shear injury (Active)   Number of days:270       Wound 01/11/18 Coccyx Ulceration non blanchable   (Active)   Number of days:248       Wound 01/24/18 Right Groin Shear injury;Skin tear (Active)   Number of days:235       Wound 07/05/18 Right Groin Laceration (Active)   Number of days:73       Rash 05/26/18 1600 pubis patch (Active)   Number of days:113       Rash 07/05/18 1900 Left mid axillary patch (Active)   Number of days:73       Labs results:   Labs Ordered and Resulted from Time of ED Arrival Up to the Time of Departure from the ED   COMPREHENSIVE METABOLIC PANEL - Abnormal; Notable for the following:        Result Value    Glucose 411 (*)     Urea Nitrogen 62 (*)     Creatinine 2.10 (*)     GFR Estimate 23 (*)     GFR Estimate If Black 28 (*)     Albumin  3.0 (*)     All other components within normal limits   CBC WITH PLATELETS DIFFERENTIAL - Abnormal; Notable for the following:     Absolute Lymphocytes 0.7 (*)     All other components within normal limits   ROUTINE UA WITH MICROSCOPIC REFLEX TO CULTURE - Abnormal; Notable for the following:     Glucose Urine >1000 (*)     Ketones Urine 5 (*)     Blood Urine Small (*)     Protein Albumin Urine 100 (*)     Nitrite Urine Positive (*)     Leukocyte Esterase Urine Large (*)     WBC Urine 286 (*)     RBC Urine 9 (*)     Bacteria Urine Moderate (*)     All other components within normal limits   LACTIC ACID WHOLE BLOOD   NASOGASTRIC TUBE DECOMPRESSION   BLOOD CULTURE   BLOOD CULTURE   URINE CULTURE AEROBIC BACTERIAL       Imaging Studies:   Recent Results (from the past 24 hour(s))   Chest  XR, 1 view portable    Narrative    Preliminary report:  This is a preliminary resident interpretation. Full report to follow.         Impression    IMPRESSION: No acute airspace opacities.         Recent vital signs:   BP (!) 204/86  Pulse 70  Resp 10  SpO2 100%    Cardiac Rhythm: Normal Sinus  Pt needs tele? No  Skin/wound Issues: None    Code Status: Full Code    Pain control: good    Nausea control: pt had none    Abnormal labs/tests/findings requiring intervention: HTN.  Clonidine given.    Family present during ED course? No   Family Comments/Social Situation comments: n/a    Tasks needing completion: None. Will transfer after head CT.    Klaudia Francisco RN  ascom-- [phone] [facility]  [phone] [facility]

## 2018-09-16 NOTE — H&P
[facility], [place]    Internal Medicine History and Physical - [facility] Night Float [facility]       Date of Admission:  9/15/2018    Chief Complaint   Altered mental status     History is obtained from the chart    History of Present Illness   Gem Jade is a 72 year old female who has a history of type 2 diabetes, polyneuropathy, kidney transplant (1999), recurrent UTI, pulmonary embolism, c diff infection, morbid obesity and seizure disorder and is admitted for altered mental status for the last 2 days presumed to be due to catheter associated UTI. She arrived to the ED via EMS and upon initial examination was unresponsive which per chart review is how she has presented in the past with urinary tract infections. She has a chronic rico in place which was changed in the ED. She lives at home with her  who is her main caregiver. He was not bedside or available for interview. Per chart review,  told EMS this is her typical state when she gets a UTI. Labs showed ROB with Cr of 2.1, normal WBC and urine with luekesterase, nitrites, moderate bacteria and 286 WBC. Normal lactate. She was given IV cefepime 2 g in the ED and admitted to medicine for further management.     Review of Systems   Review of systems not obtained due to patient factors - confusion and mental status    Past Medical History    I have reviewed this patient's medical history and updated it with pertinent information if needed.   Past Medical History:   Diagnosis Date     Background diabetic retinopathy(362.01)     extensive diabetic retinopathy, s/p multiple laser treatments     Diabetic gastroparesis (H)     followed by [facility] (Dr. Chen)     Diarrhea      Dizziness and giddiness      Hyponatremia 12/16/11    Na 121     Kidney replaced by transplant      Mixed hyperlipidemia      Obesity      Osteopenia 11/07    per DEXA     Other and unspecified hyperlipidemia      Other pulmonary embolism and  infarction 3/03    Bilateral pulmonary emboli post op after knee replacement     Polyneuropathy in diabetes(357.2)      Primary localized osteoarthrosis, lower leg      Pyelonephritis, unspecified      Type 2 diabetes mellitus with complications (H)      Urinary tract infection, site not specified     Chronic UTIs     UTI (urinary tract infection) due to urinary indwelling catheter (H)        Past Surgical History   I have reviewed this patient's surgical history and updated it with pertinent information if needed.  Past Surgical History:   Procedure Laterality Date     C NONSPECIFIC PROCEDURE  10/99    kidney transplant     C NONSPECIFIC PROCEDURE  8/00    MRI head, lumbar spine, pelvis     C TOTAL KNEE ARTHROPLASTY  3/03    Knee Replacement, Total right, post op PE     CHOLECYSTECTOMY       HC LEFT HEART CATHETERIZATION  1999    Cardiac Cath, Left Heart, Negative  (@ [facility])     HC LEFT HEART CATHETERIZATION  4/05    normal coronary angiogram at [facility], had mild troponin rise (0.71)     HC REMV CATARACT EXTRACAP,INSERT LENS  8/04    bilateral cataract repaired, left eye        Social History   Social History   Substance Use Topics     Smoking status: Former Smoker     Years: 17.00     Quit date: 6/1/1987     Smokeless tobacco: Never Used     Alcohol use No       Family History   I have reviewed this patient's family history and updated it with pertinent information if needed.   Family History   Problem Relation Age of Onset     Diabetes Mother      Parkinsonism Mother      Diabetes Brother      Hypertension Father      [id number] pneumonia     HEART DISEASE Father        Prior to Admission Medications   Prior to Admission Medications   Prescriptions Last Dose Informant Patient Reported? Taking?   ACCU-CHEK COMPACT PLUS test strip   No No   Sig: USE TO TEST BLOOD SUGAR THREE TIMES A DAY OR AS DIRECTED   ACETAMINOPHEN PO  Spouse/Significant Other Yes No   Sig: Take 325-650 mg by mouth every 4 hours as needed.    Cranberry 400 MG TABS  Spouse/Significant Other Yes No   Sig: Take 400 mg by mouth 2 times daily   Lactobacillus Rhamnosus, GG, (CULTURELL) capsule  Spouse/Significant Other No No   Sig: Take 1 capsule by mouth 2 times daily   VITAMIN D, CHOLECALCIFEROL, PO  Spouse/Significant Other Yes No   Sig: Take 4,000 Units by mouth 2 times daily   aspirin 81 MG tablet  Spouse/Significant Other Yes No   Sig: Take 81 mg by mouth daily   baclofen (LIORESAL) 10 MG tablet   No No   Sig: Take 1 tablet (10 mg) by mouth 2 times daily as needed for other (Bladder Spasms)   cloNIDine (CATAPRES) 0.1 MG tablet   No No   Sig: TAKE ONE TABLET BY MOUTH TWICE A DAY   cyanocobalamin (VITAMIN B12) 1000 MCG/ML injection   No No   Sig: Inject 1 mL (1,000 mcg) into the muscle every 30 days   famotidine (PEPCID) 20 MG tablet  Spouse/Significant Other Yes No   Sig: Take 1 tablet (20 mg) by mouth 2 times daily   gabapentin (NEURONTIN) 300 MG capsule   No No   Sig: Take 2 capsules (600 mg) by mouth 2 times daily May take an additional 300mg at bedtime daily with increase pain   hydrocortisone 1%/eucerin 1% compounded cream  Spouse/Significant Other No No   Sig: Apply topically 2 times daily   Patient taking differently: Apply topically 2 times daily as needed    insulin aspart (NOVOLOG FLEXPEN) 100 UNIT/ML injection  Spouse/Significant Other Yes No   Sig: Inject Subcutaneous 3 times daily (with meals) Sliding scale   insulin glargine (LANTUS SOLOSTAR) 100 UNIT/ML injection  Spouse/Significant Other Yes No   Sig: Inject 22 Units Subcutaneous At Bedtime    labetalol (NORMODYNE) 300 MG tablet  Spouse/Significant Other No No   Sig: Take 0.5 tablets (150 mg) by mouth 2 times daily Take 1/2 tablet (150mg) =300mg. Hold for systolic BP less than 120.   levETIRAcetam (KEPPRA) 750 MG tablet   No No   Sig: Take 1 tablet (750 mg) by mouth 2 times daily   levothyroxine (SYNTHROID/LEVOTHROID) 50 MCG tablet  Spouse/Significant Other No No   Sig: TAKE ONE TABLET  "BY MOUTH EVERY DAY   lidocaine (XYLOCAINE) 2 % topical gel  Spouse/Significant Other No No   Sig: Apply topically as needed for moderate pain 10 ML every 3 weeks   loratadine (CLARITIN) 10 MG tablet   No No   Sig: Take 1 tablet (10 mg) by mouth daily   ondansetron (ZOFRAN ODT) 4 MG ODT tab   No No   Sig: Take 1 tablet (4 mg) by mouth daily as needed for nausea 30 minutes before getting up in your wheelchair   order for DME   No No   Sig: Equipment being ordered: Bellabeat gel-T cushion 20 inches wide & 18 inches deep    height 1.676 m (5' 6\"), weight 101.6 kg (223 lb 15.8 oz)   order for DME   No No   Sig: Equipment being ordered: Tegaderm 4x4   MICHELLE 6 months  Sacral wound   pravastatin (PRAVACHOL) 10 MG tablet   No No   Sig: Take 1 tablet (10 mg) by mouth daily   predniSONE (DELTASONE) 5 MG tablet  Spouse/Significant Other No No   Sig: Take 1 tablet (5 mg) by mouth daily   simethicone (MYLICON) 125 MG CHEW  Spouse/Significant Other Yes No   Sig: Take 1 tablet (125 mg) by mouth as needed for intestinal gas   tacrolimus (GENERIC EQUIVALENT) 0.5 MG capsule   Yes No   Sig: Take 1 capsule (0.5 mg) by mouth 2 times daily Take with 1mg caps for total dose of 2.5mg BID   tacrolimus (GENERIC EQUIVALENT) 1 MG capsule   Yes No   Sig: Take 2 capsules (2 mg) by mouth 2 times daily Take with 0.5mg caps for total dose of 2.5mg BID      Facility-Administered Medications: None     Allergies   Allergies   Allergen Reactions     Bactrim Ds [Sulfamethoxazole W/Trimethoprim]      Creatinine level increased     Atorvastatin Calcium      myalgias, muscle aches     Codeine Nausea and Vomiting     Darvocet [Propoxyphene N-Apap] Nausea and Vomiting     Hydromorphone      Levofloxacin Other (See Comments) and Diarrhea     hallucinations     Morphine      Nausea and vomiting     Niaspan [Niacin] GI Disturbance     Flushing even at low dose, GI upset     Percocet [Oxycodone-Acetaminophen]      Vomiting after taking med couple of times     " Vicodin [Hydrocodone-Acetaminophen] Nausea and Vomiting       Physical Exam   Vital Signs:     BP: 174/90 Pulse: 70 Heart Rate: 69 Resp: 14 SpO2: 100 % O2 Device: None (Room air)    Weight: 0 lbs 0 oz    General Appearance: somnolent, but arousable to voice, not answering questions  Eyes: PERRL, EOMI  HEENT: normocephalic, dry lips would not open mouth on command  Respiratory: easy respiratory effort on room air, CTAB  Cardiovascular: RRR, peripheral pulses intact, capillary refill < 2 sec  GI: soft, obese, non-distended, tender in suprapubic region  Lymph/Hematologic: no bruising or bleeding  Genitourinary: unable to obtain CVA tenderness, rico catheter in place draining yellow urine  Skin: no significant skin breakdown on anterior   Musculoskeletal: no grossly abnormal joints  Neurologic: moving all extremities spontaneously, reflexes intact, will speak intermittently    Assessment & Plan   Gem Jade is a 72 year old female admitted on 9/15/2018. She presents with acute encephalopathy.    # Acute encephalopathy likely 2/2 sepsis  Head CT negative for acute bleed. No electrolyte abnormalities. Neurological exam is non-focal and patient is stable from respiratory stand point.  - consider NG for medication administration  - head CT prior to admission ordered   - neuro checks q4h  - continuous pulse ox and telemetry    # Sepsis 2/2 likely catheter associated urinary tract infection  # history of VRE and ESBL infection  Lactate negative in the ED. UA concerning for infection.  Rico exchanged in the ED.   - hold lactobacillus  - continue cefepime 2 g q12 for sepsis   - bolus 1 L of LR followed 75 ml/hr overnight  - monitor UOP   - blood and urine cultures pending  - consider transplant ID consult in the morning    # ROB likely 2/2 infection vs dehydration  # History of kidney transplant  # Bladder spasm  Baseline Cr is 1.2-1.5 and on admission was 2.10.  - trend Cr  - avoid nephrotoxic agents  - gentle fluids as  above  - transplanted kidney US ordered  - continue PTA prednisone 5 mg daily and tacrolimus 2.5 mg BID   - check tacrolimus level in the morning 1 hour prior to am dosing time  - hold baclofen 10 mg BID prn due to mental status  - placed consulted for transplant nephrology     # Hypertension  # Paroxysmal atrial fibrillation  - continue PTA clonidine 0.1 mg BID and labetalol 150 mg BID  - IV hydralazine 10 mg prn  - telemetry    # Type 2 Diabetes complicated by neuropathy, retinopathy, gastroparesis  Home regimen is novolog TID with meals and lantus 22 units nightly. Glucose elevated in the ED, however, patient had not received evening medications.  - lantus 18 units nightly as patient is NPO  - sliding scale insulin prn    =====Chronic Issues======  # GERD   -  Hold famotidine 20 mg BID due to mental status   # hypothyroidism  - continue PTA 50 mcg daily  # Chronic Pain  - hold gabapentin 600 mg BID due to mental status   # Seizures  - continue PTA keppra 750 BID    Diet:   NPO except meds and ice chips until mental status improves   Fluids: will order fluids prn  Lines: PIV in left forearm and right ankle  Johnson Catheter: in place, indication: neurogenic bladder/incontinence     DVT Prophylaxis: Pneumatic Compression Devices  Code Status: Prior  Disposition Plan   Expected discharge: 4 - 7 days, recommended to prior living arrangement once SIRS/Sepsis treated.     Entered: Holly Zhao MD 09/16/2018, 3:34 AM   Information in the above section will display in the discharge planner report.       The patient will be formally staffed in the morning.    Holly Zhao MD  [facility]   Pager: [phone]  Please see sticky note for cross cover information      Data   Data     Recent Labs  Lab 09/15/18  2347 09/15/18  2302 09/13/18  1335   WBC  --  5.7 8.2   HGB  --  13.5 10.5*   MCV  --  93 92   PLT  --  187 141*     --  128*   POTASSIUM 4.0  --  4.7   CHLORIDE 102  --   97   CO2 24  --  19*   BUN 62*  --  60*   CR 2.10*  --  2.02*   ANIONGAP 9  --  12   ROGER 9.4  --  8.8   *  --  535*   ALBUMIN 3.0*  --   --    PROTTOTAL 8.3  --   --    BILITOTAL 0.3  --   --    ALKPHOS 71  --   --    ALT 10  --   --    AST 5  --   --      Recent Results (from the past 24 hour(s))   Chest  XR, 1 view portable    Narrative    Preliminary report:  This is a preliminary resident interpretation. Full report to follow.         Impression    IMPRESSION: No acute airspace opacities.     CT Head w/o Contrast    Narrative    Preliminary report:  This is a preliminary resident interpretation. Full report to follow.         Impression    Impression: No acute intracranial pathology.

## 2018-09-16 NOTE — ED PROVIDER NOTES
History     Chief Complaint   Patient presents with     Altered Mental Status     HPI  Gem Jade is a 72 year old female with a history of DM2, polyneuropathy, kidney transplant (1999), recurrent UTI, PE, C. Diff, morbid obesity, seizure disorder who presents to the Emergency Department for the evaluation of worsening altered mental status and lethargy over the last two days. Upon initial examination at 9:58pm, patient was unresponsive. Per chart review, patient is appearing now similarly to previous UTIs. She has a chronic rico. Her  is her main caregiver.     I have reviewed the Medications, Allergies, Past Medical and Surgical History, and Social History in the [employer] system.  Past Medical History:   Diagnosis Date     Background diabetic retinopathy(362.01)     extensive diabetic retinopathy, s/p multiple laser treatments     Diabetic gastroparesis (H)     followed by [facility] (Dr. 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)      Urinary tract infection, site not specified     Chronic UTIs     UTI (urinary tract infection) due to urinary indwelling catheter (H)        Past Surgical History:   Procedure Laterality Date     C NONSPECIFIC PROCEDURE  10/99    kidney transplant     C NONSPECIFIC PROCEDURE  8/00    MRI head, lumbar spine, pelvis     C TOTAL KNEE ARTHROPLASTY  3/03    Knee Replacement, Total right, post op PE     CHOLECYSTECTOMY       HC LEFT HEART CATHETERIZATION  1999    Cardiac Cath, Left Heart, Negative  (@ [facility])     HC  LEFT HEART CATHETERIZATION  4/05    normal coronary angiogram at [facility], had mild troponin rise (0.71)     HC REMV CATARACT EXTRACAP,INSERT LENS  8/04    bilateral cataract repaired, left eye        Family History   Problem Relation Age of Onset     Diabetes Mother      Parkinsonism Mother      Diabetes Brother      Hypertension Father      [id number] pneumonia     HEART DISEASE Father        Social History   Substance Use Topics     Smoking status: Former Smoker     Years: 17.00     Quit date: 6/1/1987     Smokeless tobacco: Never Used     Alcohol use No       Current Facility-Administered Medications   Medication     ceFEPIme (MAXIPIME) 2 g vial to attach to  ml bag for ADULTS or 50 ml bag for PEDS     Current Outpatient Prescriptions   Medication     ACCU-CHEK COMPACT PLUS test strip     ACETAMINOPHEN PO     aspirin 81 MG tablet     baclofen (LIORESAL) 10 MG tablet     cloNIDine (CATAPRES) 0.1 MG tablet     Cranberry 400 MG TABS     cyanocobalamin (VITAMIN B12) 1000 MCG/ML injection     famotidine (PEPCID) 20 MG tablet     gabapentin (NEURONTIN) 300 MG capsule     hydrocortisone 1%/eucerin 1% compounded cream     insulin aspart (NOVOLOG FLEXPEN) 100 UNIT/ML injection     insulin glargine (LANTUS SOLOSTAR) 100 UNIT/ML injection     labetalol (NORMODYNE) 300 MG tablet     Lactobacillus Rhamnosus, GG, (CULTURELL) capsule     levETIRAcetam (KEPPRA) 750 MG tablet     levothyroxine (SYNTHROID/LEVOTHROID) 50 MCG tablet     lidocaine (XYLOCAINE) 2 % topical gel     loratadine (CLARITIN) 10 MG tablet     ondansetron (ZOFRAN ODT) 4 MG ODT tab     order for DME     order for DME     pravastatin (PRAVACHOL) 10 MG tablet     predniSONE (DELTASONE) 5 MG tablet     simethicone (MYLICON) 125 MG CHEW     tacrolimus (GENERIC EQUIVALENT) 0.5 MG capsule     tacrolimus (GENERIC EQUIVALENT) 1 MG capsule     VITAMIN D, CHOLECALCIFEROL, PO        Allergies   Allergen Reactions     Bactrim Ds [Sulfamethoxazole W/Trimethoprim]       Creatinine level increased     Atorvastatin Calcium      myalgias, muscle aches     Codeine Nausea and Vomiting     Darvocet [Propoxyphene N-Apap] Nausea and Vomiting     Hydromorphone      Levofloxacin Other (See Comments) and Diarrhea     hallucinations     Morphine      Nausea and vomiting     Niaspan [Niacin] GI Disturbance     Flushing even at low dose, GI upset     Percocet [Oxycodone-Acetaminophen]      Vomiting after taking med couple of times     Vicodin [Hydrocodone-Acetaminophen] Nausea and Vomiting       Review of Systems   All other systems reviewed and are negative.      Physical Exam   BP: (!) 174/100  Pulse: 66  Resp: 14  SpO2: 97 %      Physical Exam   Constitutional: She appears well-developed and well-nourished. No distress.   HENT:   Head: Normocephalic and atraumatic.   Airway intact, handling secretions   Eyes: No scleral icterus.   Neck: Normal range of motion. Neck supple.   Cardiovascular: Normal rate.    Pulmonary/Chest: Effort normal. No respiratory distress.   Abdominal: Soft. There is no tenderness.   Neurological:   Lethargic   Skin: Skin is warm and dry. No rash noted. She is not diaphoretic. No erythema. No pallor.       ED Course   9:58 PM  The patient was seen and examined by Scotty Franz MD in Room 8.     ED Course     Procedures             Critical Care time:  none             Labs Ordered and Resulted from Time of ED Arrival Up to the Time of Departure from the ED   COMPREHENSIVE METABOLIC PANEL - Abnormal; Notable for the following:        Result Value    Glucose 411 (*)     Urea Nitrogen 62 (*)     Creatinine 2.10 (*)     GFR Estimate 23 (*)     GFR Estimate If Black 28 (*)     Albumin 3.0 (*)     All other components within normal limits   CBC WITH PLATELETS DIFFERENTIAL - Abnormal; Notable for the following:     Absolute Lymphocytes 0.7 (*)     All other components within normal limits   ROUTINE UA WITH MICROSCOPIC REFLEX TO CULTURE - Abnormal; Notable for the following:      Glucose Urine >1000 (*)     Ketones Urine 5 (*)     Blood Urine Small (*)     Protein Albumin Urine 100 (*)     Nitrite Urine Positive (*)     Leukocyte Esterase Urine Large (*)     WBC Urine 286 (*)     RBC Urine 9 (*)     Bacteria Urine Moderate (*)     All other components within normal limits   LACTIC ACID WHOLE BLOOD   BLOOD CULTURE   BLOOD CULTURE   URINE CULTURE AEROBIC BACTERIAL           Results for orders placed or performed during the hospital encounter of 09/15/18   Chest  XR, 1 view portable    Narrative    Preliminary report:  This is a preliminary resident interpretation. Full report to follow.         Impression    IMPRESSION: No acute airspace opacities.     Comprehensive metabolic panel   Result Value Ref Range    Sodium 135 133 - 144 mmol/L    Potassium 4.0 3.4 - 5.3 mmol/L    Chloride 102 94 - 109 mmol/L    Carbon Dioxide 24 20 - 32 mmol/L    Anion Gap 9 3 - 14 mmol/L    Glucose 411 (H) 70 - 99 mg/dL    Urea Nitrogen 62 (H) 7 - 30 mg/dL    Creatinine 2.10 (H) 0.52 - 1.04 mg/dL    GFR Estimate 23 (L) >60 mL/min/1.7m2    GFR Estimate If Black 28 (L) >60 mL/min/1.7m2    Calcium 9.4 8.5 - 10.1 mg/dL    Bilirubin Total 0.3 0.2 - 1.3 mg/dL    Albumin 3.0 (L) 3.4 - 5.0 g/dL    Protein Total 8.3 6.8 - 8.8 g/dL    Alkaline Phosphatase 71 40 - 150 U/L    ALT 10 0 - 50 U/L    AST 5 0 - 45 U/L   CBC with platelets differential   Result Value Ref Range    WBC 5.7 4.0 - 11.0 10e9/L    RBC Count 4.51 3.8 - 5.2 10e12/L    Hemoglobin 13.5 11.7 - 15.7 g/dL    Hematocrit 42.0 35.0 - 47.0 %    MCV 93 78 - 100 fl    MCH 29.9 26.5 - 33.0 pg    MCHC 32.1 31.5 - 36.5 g/dL    RDW 13.2 10.0 - 15.0 %    Platelet Count 187 150 - 450 10e9/L    Diff Method Automated Method     % Neutrophils 75.5 %    % Lymphocytes 13.0 %    % Monocytes 9.5 %    % Eosinophils 0.7 %    % Basophils 0.2 %    % Immature Granulocytes 1.1 %    Nucleated RBCs 0 0 /100    Absolute Neutrophil 4.3 1.6 - 8.3 10e9/L    Absolute Lymphocytes 0.7 (L) 0.8  - 5.3 10e9/L    Absolute Monocytes 0.5 0.0 - 1.3 10e9/L    Absolute Eosinophils 0.0 0.0 - 0.7 10e9/L    Absolute Basophils 0.0 0.0 - 0.2 10e9/L    Abs Immature Granulocytes 0.1 0 - 0.4 10e9/L    Absolute Nucleated RBC 0.0    UA with Microscopic reflex to Culture   Result Value Ref Range    Color Urine Light Yellow     Appearance Urine Slightly Cloudy     Glucose Urine >1000 (A) NEG^Negative mg/dL    Bilirubin Urine Negative NEG^Negative    Ketones Urine 5 (A) NEG^Negative mg/dL    Specific Gravity Urine 1.010 1.003 - 1.035    Blood Urine Small (A) NEG^Negative    pH Urine 5.5 5.0 - 7.0 pH    Protein Albumin Urine 100 (A) NEG^Negative mg/dL    Urobilinogen mg/dL Normal 0.0 - 2.0 mg/dL    Nitrite Urine Positive (A) NEG^Negative    Leukocyte Esterase Urine Large (A) NEG^Negative    Source Catheterized Urine     WBC Urine 286 (H) 0 - 5 /HPF    RBC Urine 9 (H) 0 - 2 /HPF    Bacteria Urine Moderate (A) NEG^Negative /HPF   Lactic acid   Result Value Ref Range    Lactic Acid 1.2 0.7 - 2.0 mmol/L   Blood culture   Result Value Ref Range    Specimen Description Blood Right Ankle     Special Requests Received in aerobic bottle only     Culture Micro PENDING    Blood culture   Result Value Ref Range    Specimen Description Blood Left Arm     Special Requests Received in aerobic bottle only     Culture Micro PENDING      *Note: Due to a large number of results and/or encounters for the requested time period, some results have not been displayed. A complete set of results can be found in Results Review.     Medications   ceFEPIme (MAXIPIME) 2 g vial to attach to  ml bag for ADULTS or 50 ml bag for PEDS (2 g Intravenous New Bag 9/16/18 0003)         Assessments & Plan (with Medical Decision Making)   This is a 72-year-old female patient coming into emergency room with altered mental status and lethargy has been increasing over the past couple of days.  Her  who is the primary caregiver told EMS that this is very  typical for her when she has a urinary tract infection.  She has had multiple urinary tract infection secondary to chronic indwelling Johnson.  On my physical exam she is handling her secretions her airway is intact however she is extremely lethargic and has some altered mental status.  Thus her exam is unremarkable.  Vital signs are within normal limits.  I did review her multiple medical charts as well as her urine cultures in the past where she did receive cefepime for her urinary tract infections.  At this time she will be admitted to the medicine service for continued care management.    I have reviewed the nursing notes.    I have reviewed the findings, diagnosis, plan and need for follow up with the patient.    New Prescriptions    No medications on file       Final diagnoses:   UTI (urinary tract infection)   Delirium     I, Marvin Mandel, am serving as a trained medical scribe to document services personally performed by Scotty Franz MD, based on the provider's statements to me.   IScotty MD, was physically present and have reviewed and verified the accuracy of this note documented by Marvin Mandel.    9/15/2018   [facility], [place], EMERGENCY DEPARTMENT     Scotty Franz MD  09/16/18 0032       Scotty Franz MD  09/16/18 0033

## 2018-09-16 NOTE — ED TRIAGE NOTES
Increased AMS and lethargy over the last 2 days,  is main caregiver. Pt is currently appearing like past UTI's. Chronic rico

## 2018-09-16 NOTE — ED NOTES
[facility], [place]   ED Nurse to Floor Handoff     Gem Jade is a 72 year old female who speaks English and lives with a spouse,  in a home  They arrived in the ED by ambulance from home    ED Chief Complaint: Altered Mental Status    ED Dx;   Final diagnoses:   UTI (urinary tract infection)   Delirium         Needed?: No    Allergies:   Allergies   Allergen Reactions     Bactrim Ds [Sulfamethoxazole W/Trimethoprim]      Creatinine level increased     Atorvastatin Calcium      myalgias, muscle aches     Codeine Nausea and Vomiting     Darvocet [Propoxyphene N-Apap] Nausea and Vomiting     Hydromorphone      Levofloxacin Other (See Comments) and Diarrhea     hallucinations     Morphine      Nausea and vomiting     Niaspan [Niacin] GI Disturbance     Flushing even at low dose, GI upset     Percocet [Oxycodone-Acetaminophen]      Vomiting after taking med couple of times     Vicodin [Hydrocodone-Acetaminophen] Nausea and Vomiting   .  Past Medical Hx:   Past Medical History:   Diagnosis Date     Background diabetic retinopathy(362.01)     extensive diabetic retinopathy, s/p multiple laser treatments     Diabetic gastroparesis (H)     followed by [facility] (Dr. 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)      Urinary tract infection, site not specified     Chronic UTIs     UTI (urinary tract infection) due to urinary indwelling catheter (H)       Baseline Mental status: WDL  Current Mental Status changes:  other lethargic and disoriented - GCS 13. Patient opens eyes to voice and stimuli, follows some commands, but is not able to answer any orientation questions. Denies pain.     Infection present or suspected this encounter: yes urinary  Sepsis suspected: No  Isolation type: Contact     Activity level - Baseline/Home:  bedbound  Activity Level - Current:   Total Care -turns in bed with assist of 2    Bariatric equipment needed?: No    In the ED these meds were given:   Medications   levothyroxine (SYNTHROID/LEVOTHROID) tablet 50 mcg (not administered)   levETIRAcetam (KEPPRA) tablet 750 mg (not administered)   predniSONE (DELTASONE) tablet 5 mg (not administered)   tacrolimus (GENERIC EQUIVALENT) capsule 0.5 mg (not administered)   tacrolimus (GENERIC EQUIVALENT) capsule 2 mg (not administered)   cloNIDine (CATAPRES) tablet 0.1 mg (not administered)   famotidine (PEPCID) tablet 20 mg (not administered)   labetalol (NORMODYNE) half-tab 150 mg (not administered)   naloxone (NARCAN) injection 0.1-0.4 mg (not administered)   ondansetron (ZOFRAN-ODT) ODT tab 4 mg (not administered)     Or   ondansetron (ZOFRAN) injection 4 mg (not administered)   ceFEPIme (MAXIPIME) 2 g vial to attach to  ml bag for ADULTS or 50 ml bag for PEDS (not administered)   glucose gel 15-30 g (not administered)     Or   dextrose 50 % injection 25-50 mL (not administered)     Or   glucagon injection 1 mg (not administered)   insulin glargine (LANTUS) injection 18 Units (18 Units Subcutaneous Given 9/16/18 0311)   insulin aspart (NovoLOG) inj (RAPID ACTING) (4 Units Subcutaneous Given 9/16/18 0310)   lactated ringers infusion ( Intravenous New Bag 9/16/18 0557)   ceFEPIme (MAXIPIME) 2 g vial to attach to  ml bag for ADULTS or 50 ml bag for PEDS (0 g Intravenous Stopped 9/16/18 0033)   cloNIDine (CATAPRES) tablet 0.1 mg (0.1 mg Oral Given 9/16/18 0203)   lactated ringers BOLUS 1,000 mL (0 mLs Intravenous Stopped 9/16/18 0557)       Drips  running?  No - LR at 75/hr    Home pump  No    Current LDAs  Peripheral IV 09/15/18 Right  (Active)   Number of days:1       Peripheral IV 09/15/18 Left  (Active)   Number of days:1       Urethral Catheter Double-lumen 16 fr (Active)   Number of days:248       Urethral Catheter Coude 18 fr (Active)   Urine Output 400 mL 9/16/2018  4:02 AM   Number of days:1       Wound 07/05/17 Posterior Buttocks Skin tear excoriation (Active)   Number of days:438       Wound 12/20/17 Left Groin Skin tear;Shear injury (Active)   Number of days:270       Wound 01/11/18 Coccyx Ulceration non blanchable   (Active)   Number of days:248       Wound 01/24/18 Right Groin Shear injury;Skin tear (Active)   Number of days:235       Wound 07/05/18 Right Groin Laceration (Active)   Number of days:73       Rash 05/26/18 1600 pubis patch (Active)   Number of days:113       Rash 07/05/18 1900 Left mid axillary patch (Active)   Number of days:73       Labs results:   Labs Ordered and Resulted from Time of ED Arrival Up to the Time of Departure from the ED   COMPREHENSIVE METABOLIC PANEL - Abnormal; Notable for the following:        Result Value    Glucose 411 (*)     Urea Nitrogen 62 (*)     Creatinine 2.10 (*)     GFR Estimate 23 (*)     GFR Estimate If Black 28 (*)     Albumin 3.0 (*)     All other components within normal limits   CBC WITH PLATELETS DIFFERENTIAL - Abnormal; Notable for the following:     Absolute Lymphocytes 0.7 (*)     All other components within normal limits   ROUTINE UA WITH MICROSCOPIC REFLEX TO CULTURE - Abnormal; Notable for the following:     Glucose Urine >1000 (*)     Ketones Urine 5 (*)     Blood Urine Small (*)     Protein Albumin Urine 100 (*)     Nitrite Urine Positive (*)     Leukocyte Esterase Urine Large (*)     WBC Urine 286 (*)     RBC Urine 9 (*)     Bacteria Urine Moderate (*)     All other components within normal limits   GLUCOSE BY METER - Abnormal; Notable for the following:     Glucose 312 (*)     All  other components within normal limits   GLUCOSE BY METER - Abnormal; Notable for the following:     Glucose 339 (*)     All other components within normal limits   LACTIC ACID WHOLE BLOOD   GLUCOSE MONITOR NURSING POCT   GLUCOSE MONITOR NURSING POCT   TACROLIMUS LEVEL   RENAL PANEL   CBC WITH PLATELETS   HEMOGLOBIN A1C   NASOGASTRIC TUBE DECOMPRESSION   IP ASSIGN PROVIDER TEAM TO TREATMENT TEAM   VITAL SIGNS   PULSE OXIMETRY NURSING   INTAKE AND OUTPUT   NEURO CHECKS   INDWELLING URINARY CATHETER (HODGES)   DISCONTINUE INDWELLING URINARY CATHETER (HODGES)   BLADDER SCAN   APPLY PNEUMATIC COMPRESSION DEVICE (PCD)   IP CARBOHYDRATE COUNTING BY NURSING   PATIENT EDUCATION   ASSESS FOR HYPOGLYCEMIA SYMPTOMS   NOTIFY PHYSICIAN   TELEMETRY MONITORING MED/SURG   BLOOD CULTURE   BLOOD CULTURE   URINE CULTURE AEROBIC BACTERIAL       Imaging Studies:   Recent Results (from the past 24 hour(s))   Chest  XR, 1 view portable    Narrative    Preliminary report:  This is a preliminary resident interpretation. Full report to follow.         Impression    IMPRESSION: No acute airspace opacities.     CT Head w/o Contrast    Narrative    Preliminary report:  This is a preliminary resident interpretation. Full report to follow.         Impression    Impression: No acute intracranial pathology.   US Renal Transplant    Narrative    Preliminary report:  This is a preliminary resident interpretation. Full report to follow.         Impression    IMPRESSION:  1.  Normal grayscale appearance of the right lower quadrant transplant  kidney. No hydronephrosis or perinephric fluid collection.  2.  Mildly elevated resistive indices of the arcuate arteries,  decreased from 4/20/2017. This is nonspecific and can be seen with  medical renal disease, acute tubular necrosis, and rejection.       Recent vital signs:   /82  Pulse 70  Resp 12  SpO2 100%    Cardiac Rhythm: Normal Sinus  Pt needs tele? Yes  Skin/wound Issues: excoriated buttocks and  brandie area    Code Status: Full Code    Pain control: pt had none    Nausea control: pt had none    Abnormal labs/tests/findings requiring intervention: BP elevated on arrival (204/86) - clonidine given, last /82    Patient has chronic rico, which was replaced in ED. Cefepime given for UTI.     Family present during ED course? No   Family Comments/Social Situation comments:  is caregiver at home. He is aware of admission.     Tasks needing completion: None    Kim Mann, RN    [phone] [facility]

## 2018-09-16 NOTE — PLAN OF CARE
Problem: Patient Care Overview  Goal: Plan of Care/Patient Progress Review  Pt admitted from ED at 1700. Pt bed bound. Chronic rico. Here with UTI and altered mental status. Cognition waxes and wanes. When arrived to 7A pt only answering yes or no questions, not following commands, responding to shaking. Pt now wakes to voice, speaks in short sentences, and follows some commands but needs time to respond. /89, hydralazine given with recheck of 135/63. Blood sugar 307 and covered with novolog. Pt NPO with meds. LR @75ml/hr. Blood culture + for gram positive cocci in clusters. Cross cover paged with result.

## 2018-09-16 NOTE — CONSULTS
[facility], [place]  Transplant Nephrology Consult  Date of Admission:  9/15/2018  Requesting physician: Miguelito Camarena, *    Assessment & Plan   # LDKT: basline Cr ~ 1.5-1.8; Stable renal function.  The patient had an increase in creatinine on admission that is improved today.   - Proteinuria: Nephrotic range   - Latest DSA: Not checked Date of DSA last checked: n/a   - BK: No   - Kidney Tx Biopsy: Yes.     - Sirolimus may have caused severe proteinuria during the 4/2017 biopsy but not is off.  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  I recommend rechecking random protein to creatinine ratio     - She has marginal renal function as indicated by the imaging showing cortical thinning.  Her kidney function is progressively declining with fluctuations with the UTI episodes.    # Immunosuppression: Tacrolimus immediate release (goal  4-6) and Prednisone (dose 5 mg daily)   - Changes: Yes - high level today, will reduce to 1.5 mg bid and recheck   - Check tacrolimus level    # Hypertension: Controlled; Goal BP: 130/80 on clonidine 0.1 mg bid and labetalol 150 mg bid   - Changes: No , blood pressure currently well controlled on home medications.  Goal should be aggressive with the FSGS changes seen and would consider restarting acei/arb if bp's are consistently elevated above goal.  She was to restart losartan back in 4/2017 but appears to have had a rise in creatinine associated with low bp's after discharge 5/2017 and losartan was stopped.  Will monitor to see if able to start low dose.    # Anemia in chronic renal disease: Hgb: Stable   - Iron studies: Replete    # Mineral Bone Disorder:   - Secondary renal hyperparathyroidism; PTH level is:  significantly elevated  - Vitamin D; level is:  normal  - Calcium; level is:  normal  - Phosphorus; level is: normal   - Recheck PTH level    # Electrolytes:   - Potassium; level:  normal    # Other Medical Issues:   # Recurrent UTI / Sepsis - Recent positive blood culture son 9/13 for S. Hominis which is likely contaminent.  However, recent urine growth of E coli which is sensitive for ceftriaxone.  I recommend at least a 14 days course of antibiotics for this infection and follow up with transplant ID.    # H/o EBV viremia - recheck level    # Transplant History:  Etiology of kidney failure: diabetes mellitus type 2  Transplant: 10/27/1999 (Kidney)  Donor Type: Living Donor Class:   Crossmatch at time of Tx: unknown  DSA at time of Tx: unknown  Significant changes in immunosuppression: sirolimus stopped 2017 for FSGS changes on biopsy  CMV IgG Ab Discordance (D+/R-): unknown  EBV IgG Ab Discordance (D+/R-): unknown  Significant Complications: recurrent UTIs    Recommendations were communicated to the primary team via this note    Viral Eduar Ley MD  Pager: [phone]  Transplant Office Phone Number: [phone]    REASON FOR CONSULT   History of Kidney Transplant    History of Present Illness   Gem Jade is a 72 year old female with history of end stage kidney disease secondary to diabetes status post living related kidney transplantation 1999 with post transplant complications of recurrent urinary tract infections.  She was hospitalized on 9/15 for altered mental status secondary to urinary tract infections.  She notes a history of being bed bound with home care and care from her .  She is managed with a chronic rico catheter which was exchanged one week prior to admission.  She had no complaints after the rico was exchanged other that there has been an ongoing leak around the catheter.  She denies fevers, chills, or night sweats.  Prior to arrival she had altered mentation at home and notes indicate obtundation yesterday.  She notes ongoing hallucinations at this time.  She presents with UTIs predominantly with encephalopathy as in this case and has labs consistent with a  UTI.  SHe has been started on cefepime which is changed to ceftriaxone today.  She was last hospitalized 7/2018 with a urinary tract infection.  Her immunosuppression is tacrolimus and prednisone.  A kidney biopsy was performed 4/2017 which shows 45% global sclerosis, 5% segmental sclerosis, and 40% tubulo-interstitial scarring.  She was diagnosed with FSGS.  She has not been seen by transplant nephrology as an outpatient for several years.  She was seen inpatient 4/2017 and at that time was on tac, sirolimus, and prednisone.  Sirolimus was stopped due to concerns for worsening FSGS.  Proteinuria has not been rechecked.    Review of Systems    Review of Systems    ROS: 10 point ROS neg other than the symptoms noted above in the HPI.    Past Medical History    I have reviewed this patient's medical history and updated it with pertinent information if needed.   Past Medical History:   Diagnosis Date     Background diabetic retinopathy(362.01)     extensive diabetic retinopathy, s/p multiple laser treatments     Diabetic gastroparesis (H)     followed by [facility] (Dr. 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)      Urinary tract infection, site not specified     Chronic UTIs     UTI (urinary tract infection) due to urinary indwelling catheter (H)         Past Surgical History   I have reviewed this patient's surgical history and updated it with pertinent information if needed.  Past Surgical History:   Procedure Laterality Date     C NONSPECIFIC  PROCEDURE  10/99    kidney transplant     C NONSPECIFIC PROCEDURE  8/00    MRI head, lumbar spine, pelvis     C TOTAL KNEE ARTHROPLASTY  3/03    Knee Replacement, Total right, post op PE     CHOLECYSTECTOMY       HC LEFT HEART CATHETERIZATION  1999    Cardiac Cath, Left Heart, Negative  (@ [facility])     HC LEFT HEART CATHETERIZATION  4/05    normal coronary angiogram at [facility], had mild troponin rise (0.71)     HC REMV CATARACT EXTRACAP,INSERT LENS  8/04    bilateral cataract repaired, left eye         Social History   Social History   Substance Use Topics     Smoking status: Former Smoker     Years: 17.00     Quit date: 6/1/1987     Smokeless tobacco: Never Used     Alcohol use No       Family History   I have reviewed this patient's family history and updated it with pertinent information if needed.   Family History   Problem Relation Age of Onset     Diabetes Mother      Parkinsonism Mother      Diabetes Brother      Hypertension Father      [id number] pneumonia     HEART DISEASE Father        Prior to Admission Medications   Prior to Admission Medications   Prescriptions Last Dose Informant Patient Reported? Taking?   ACCU-CHEK COMPACT PLUS test strip   No No   Sig: USE TO TEST BLOOD SUGAR THREE TIMES A DAY OR AS DIRECTED   ACETAMINOPHEN PO  Spouse/Significant Other Yes No   Sig: Take 325-650 mg by mouth every 4 hours as needed.   Cranberry 400 MG TABS  Spouse/Significant Other Yes No   Sig: Take 400 mg by mouth 2 times daily   Lactobacillus Rhamnosus, GG, (CULTURELL) capsule  Spouse/Significant Other No No   Sig: Take 1 capsule by mouth 2 times daily   VITAMIN D, CHOLECALCIFEROL, PO  Spouse/Significant Other Yes No   Sig: Take 4,000 Units by mouth 2 times daily   aspirin 81 MG tablet  Spouse/Significant Other Yes No   Sig: Take 81 mg by mouth daily   baclofen (LIORESAL) 10 MG tablet   No No   Sig: Take 1 tablet (10 mg) by mouth 2 times daily as needed for other (Bladder Spasms)   cloNIDine (CATAPRES) 0.1 MG tablet    "No No   Sig: TAKE ONE TABLET BY MOUTH TWICE A DAY   cyanocobalamin (VITAMIN B12) 1000 MCG/ML injection   No No   Sig: Inject 1 mL (1,000 mcg) into the muscle every 30 days   famotidine (PEPCID) 20 MG tablet  Spouse/Significant Other Yes No   Sig: Take 1 tablet (20 mg) by mouth 2 times daily   gabapentin (NEURONTIN) 300 MG capsule   No No   Sig: Take 2 capsules (600 mg) by mouth 2 times daily May take an additional 300mg at bedtime daily with increase pain   hydrocortisone 1%/eucerin 1% compounded cream  Spouse/Significant Other No No   Sig: Apply topically 2 times daily   Patient taking differently: Apply topically 2 times daily as needed    insulin aspart (NOVOLOG FLEXPEN) 100 UNIT/ML injection  Spouse/Significant Other Yes No   Sig: Inject Subcutaneous 3 times daily (with meals) Sliding scale   insulin glargine (LANTUS SOLOSTAR) 100 UNIT/ML injection  Spouse/Significant Other Yes No   Sig: Inject 22 Units Subcutaneous At Bedtime    labetalol (NORMODYNE) 300 MG tablet  Spouse/Significant Other No No   Sig: Take 0.5 tablets (150 mg) by mouth 2 times daily Take 1/2 tablet (150mg) =300mg. Hold for systolic BP less than 120.   levETIRAcetam (KEPPRA) 750 MG tablet   No No   Sig: Take 1 tablet (750 mg) by mouth 2 times daily   levothyroxine (SYNTHROID/LEVOTHROID) 50 MCG tablet  Spouse/Significant Other No No   Sig: TAKE ONE TABLET BY MOUTH EVERY DAY   lidocaine (XYLOCAINE) 2 % topical gel  Spouse/Significant Other No No   Sig: Apply topically as needed for moderate pain 10 ML every 3 weeks   loratadine (CLARITIN) 10 MG tablet   No No   Sig: Take 1 tablet (10 mg) by mouth daily   ondansetron (ZOFRAN ODT) 4 MG ODT tab   No No   Sig: Take 1 tablet (4 mg) by mouth daily as needed for nausea 30 minutes before getting up in your wheelchair   order for DME   No No   Sig: Equipment being ordered: [employer] gel-T cushion 20 inches wide & 18 inches deep    height 1.676 m (5' 6\"), weight 101.6 kg (223 lb 15.8 oz)   order for DME "   No No   Sig: Equipment being ordered: Tegaderm 4x4   MICHELLE 6 months  Sacral wound   pravastatin (PRAVACHOL) 10 MG tablet   No No   Sig: Take 1 tablet (10 mg) by mouth daily   predniSONE (DELTASONE) 5 MG tablet  Spouse/Significant Other No No   Sig: Take 1 tablet (5 mg) by mouth daily   simethicone (MYLICON) 125 MG CHEW  Spouse/Significant Other Yes No   Sig: Take 1 tablet (125 mg) by mouth as needed for intestinal gas   tacrolimus (GENERIC EQUIVALENT) 0.5 MG capsule   Yes No   Sig: Take 1 capsule (0.5 mg) by mouth 2 times daily Take with 1mg caps for total dose of 2.5mg BID   tacrolimus (GENERIC EQUIVALENT) 1 MG capsule   Yes No   Sig: Take 2 capsules (2 mg) by mouth 2 times daily Take with 0.5mg caps for total dose of 2.5mg BID      Facility-Administered Medications: None     Allergies   Allergies   Allergen Reactions     Bactrim Ds [Sulfamethoxazole W/Trimethoprim]      Creatinine level increased     Atorvastatin Calcium      myalgias, muscle aches     Codeine Nausea and Vomiting     Darvocet [Propoxyphene N-Apap] Nausea and Vomiting     Hydromorphone      Levofloxacin Other (See Comments) and Diarrhea     hallucinations     Morphine      Nausea and vomiting     Niaspan [Niacin] GI Disturbance     Flushing even at low dose, GI upset     Percocet [Oxycodone-Acetaminophen]      Vomiting after taking med couple of times     Vicodin [Hydrocodone-Acetaminophen] Nausea and Vomiting       Physical Exam   Temp  Av.7  F (36.5  C)  Min: 97.7  F (36.5  C)  Max: 97.7  F (36.5  C)      Pulse  Av.8  Min: 62  Max: 70 Resp  Av.8  Min: 9  Max: 17  SpO2  Av.8 %  Min: 86 %  Max: 100 %     BP (!) 195/91  Pulse 70  Temp 97.7  F (36.5  C) (Axillary)  Resp 9  SpO2 100%     Admit       GENERAL APPEARANCE: alert and no distress  HENT: mouth without ulcers or lesions  LYMPHATICS: no cervical or supraclavicular nodes  RESP: lungs clear to auscultation - no rales, rhonchi or wheezes  CV: regular rhythm, normal rate,  no rub, no murmur  EDEMA: no LE edema bilaterally  ABDOMEN: soft, nondistended, nontender, bowel sounds normal  MS: extremities normal - no gross deformities noted, no evidence of inflammation in joints, no muscle tenderness  SKIN: no rash  TX KIDNEY: normal    Data   CMP  Recent Labs  Lab 09/16/18  0751 09/15/18  2347 09/13/18  1335    135 128*   POTASSIUM 4.2 4.0 4.7   CHLORIDE 107 102 97   CO2 21 24 19*   ANIONGAP 10 9 12   * 411* 535*   BUN 60* 62* 60*   CR 1.88* 2.10* 2.02*   GFRESTIMATED 26* 23* 24*   GFRESTBLACK 32* 28* 29*   ROGER 9.0 9.4 8.8   PHOS 2.7  --   --    PROTTOTAL  --  8.3  --    ALBUMIN 2.8* 3.0*  --    BILITOTAL  --  0.3  --    ALKPHOS  --  71  --    AST  --  5  --    ALT  --  10  --      CBC  Recent Labs  Lab 09/16/18  0751 09/15/18  2302 09/13/18  1335   HGB 11.4* 13.5 10.5*   WBC 6.4 5.7 8.2   RBC 3.89 4.51 3.63*   HCT 36.0 42.0 33.3*   MCV 93 93 92   MCH 29.3 29.9 28.9   MCHC 31.7 32.1 31.5   RDW 13.3 13.2 13.5    187 141*     INRNo lab results found in last 7 days.  ABGNo lab results found in last 7 days.   Urine Studies  Recent Labs   Lab Test  09/15/18   2303  07/28/18   1450  07/05/18   1545  05/25/18   0155   01/11/18   1614  12/19/17   1620  11/13/17   1646   COLOR  Light Yellow  Straw  Light Yellow  Yellow   < >  Yellow  Pink  Yellow   APPEARANCE  Slightly Cloudy  Clear  Cloudy  Cloudy   < >  Turbid  Slightly Cloudy  Turbid   URINEGLC  >1000*  Negative  100*  70*   < >  100*  100*  500*   URINEBILI  Negative  Negative  Negative  Negative   < >  Negative  Negative  Negative   URINEKETONE  5*  Negative  Negative  Negative   < >  Negative  Negative  Negative   SG  1.010  1.004  1.020  1.013   < >  1.025  1.015  1.015   UBLD  Small*  Negative  Large*  Moderate*   < >  Large*  Large*  Moderate*   URINEPH  5.5  5.5  6.0  5.5   < >  6.0  6.5  6.5   PROTEIN  100*  30*  >600*  100*   < >  >=300*  100*  100*   UROBILINOGEN   --    --    --    --    --   0.2  0.2  0.2   NITRITE   Positive*  Negative  Positive*  Positive*   < >  Positive*  Negative  Negative   LEUKEST  Large*  Large*  Large*  Large*   < >  Large*  Large*  Small*   RBCU  9*  8*  >182*  100*   < >  5-10*  25-50*  25-50*   WBCU  286*  24*  >182*  >182*   < >  >100*  *  >100*    < > = values in this interval not displayed.     Recent Labs   Lab Test  04/24/17   1930  10/07/11   1230  09/27/11   1600   UTPG  15.28*  Test canceled by PCU/Clinic  Charge credited PER RADHA FALCON  0.07     PTH  Recent Labs   Lab Test  03/11/17   0859  02/16/17   2303  10/07/11   1215  09/27/11   1600   PTHI  334*  9*  Test canceled by PCU/Clinic  Charge credited PER RADHA FALCON  53     Iron Studies  Recent Labs   Lab Test  02/17/17   2358  09/04/16   0815  08/07/16   0750  08/09/12   0819  07/14/12   0720 05/08/12  10/07/11   1215  09/27/11   1600   IRON  50  124  12*  68  154  68  Test canceled by PCU/Clinic  Charge credited CANCELED PER RADHA FALCON CORRECTED ON 10/07 AT 1459: PREVIOUSLY REPORTED AS 69  26*   FEB  170*  128*  166*  188*  169*   --   Test canceled by PCU/Clinic  Charge credited CANCELED PER RADHA FALCON CORRECTED ON 10/07 AT 1459: PREVIOUSLY REPORTED   227*   IRONSAT  29  97*  7*  36  91*   --   Test canceled by PCU/Clinic  Charge credited CANCELED PER RADHA FALCON CORRECTED ON 10/07 AT 1459: PREVIOUSLY REPORTED AS 30  12*   GIANA  1069*  2676*  645*   --   444*  630?   --   72       IMAGING:  All imaging studies reviewed by me.   CXR is unremarkable.  CT head is unremarkable.  Kidney transplant ultrasound shows cortical thinning and elevated resistive indices.

## 2018-09-17 ENCOUNTER — PATIENT OUTREACH (OUTPATIENT)
Dept: CARE COORDINATION | Facility: CLINIC | Age: 73
End: 2018-09-17

## 2018-09-17 VITALS
DIASTOLIC BLOOD PRESSURE: 76 MMHG | RESPIRATION RATE: 18 BRPM | SYSTOLIC BLOOD PRESSURE: 132 MMHG | HEART RATE: 62 BPM | TEMPERATURE: 96.8 F | OXYGEN SATURATION: 98 %

## 2018-09-17 DIAGNOSIS — T83.511D URINARY TRACT INFECTION ASSOCIATED WITH INDWELLING URETHRAL CATHETER, SUBSEQUENT ENCOUNTER: Primary | ICD-10-CM

## 2018-09-17 DIAGNOSIS — N39.0 URINARY TRACT INFECTION ASSOCIATED WITH INDWELLING URETHRAL CATHETER, SUBSEQUENT ENCOUNTER: Primary | ICD-10-CM

## 2018-09-17 LAB
ALBUMIN SERPL-MCNC: 2.6 G/DL (ref 3.4–5)
ALP SERPL-CCNC: 54 U/L (ref 40–150)
ALT SERPL W P-5'-P-CCNC: 9 U/L (ref 0–50)
ANION GAP SERPL CALCULATED.3IONS-SCNC: 8 MMOL/L (ref 3–14)
AST SERPL W P-5'-P-CCNC: 6 U/L (ref 0–45)
BACTERIA SPEC CULT: ABNORMAL
BASOPHILS # BLD AUTO: 0 10E9/L (ref 0–0.2)
BASOPHILS NFR BLD AUTO: 0.6 %
BILIRUB SERPL-MCNC: 0.2 MG/DL (ref 0.2–1.3)
BUN SERPL-MCNC: 50 MG/DL (ref 7–30)
CALCIUM SERPL-MCNC: 8.8 MG/DL (ref 8.5–10.1)
CHLORIDE SERPL-SCNC: 111 MMOL/L (ref 94–109)
CO2 SERPL-SCNC: 22 MMOL/L (ref 20–32)
CREAT SERPL-MCNC: 1.72 MG/DL (ref 0.52–1.04)
DIFFERENTIAL METHOD BLD: ABNORMAL
EOSINOPHIL # BLD AUTO: 0.1 10E9/L (ref 0–0.7)
EOSINOPHIL NFR BLD AUTO: 2 %
ERYTHROCYTE [DISTWIDTH] IN BLOOD BY AUTOMATED COUNT: 13.3 % (ref 10–15)
GFR SERPL CREATININE-BSD FRML MDRD: 29 ML/MIN/1.7M2
GLUCOSE BLDC GLUCOMTR-MCNC: 106 MG/DL (ref 70–99)
GLUCOSE BLDC GLUCOMTR-MCNC: 153 MG/DL (ref 70–99)
GLUCOSE BLDC GLUCOMTR-MCNC: 159 MG/DL (ref 70–99)
GLUCOSE BLDC GLUCOMTR-MCNC: 163 MG/DL (ref 70–99)
GLUCOSE BLDC GLUCOMTR-MCNC: 236 MG/DL (ref 70–99)
GLUCOSE BLDC GLUCOMTR-MCNC: 249 MG/DL (ref 70–99)
GLUCOSE SERPL-MCNC: 120 MG/DL (ref 70–99)
HCT VFR BLD AUTO: 33.6 % (ref 35–47)
HGB BLD-MCNC: 11 G/DL (ref 11.7–15.7)
IMM GRANULOCYTES # BLD: 0.2 10E9/L (ref 0–0.4)
IMM GRANULOCYTES NFR BLD: 3 %
LYMPHOCYTES # BLD AUTO: 1.2 10E9/L (ref 0.8–5.3)
LYMPHOCYTES NFR BLD AUTO: 19.2 %
Lab: ABNORMAL
MCH RBC QN AUTO: 29.7 PG (ref 26.5–33)
MCHC RBC AUTO-ENTMCNC: 32.7 G/DL (ref 31.5–36.5)
MCV RBC AUTO: 91 FL (ref 78–100)
MONOCYTES # BLD AUTO: 0.6 10E9/L (ref 0–1.3)
MONOCYTES NFR BLD AUTO: 9 %
NEUTROPHILS # BLD AUTO: 4.2 10E9/L (ref 1.6–8.3)
NEUTROPHILS NFR BLD AUTO: 66.2 %
NRBC # BLD AUTO: 0 10*3/UL
NRBC BLD AUTO-RTO: 0 /100
PLATELET # BLD AUTO: 150 10E9/L (ref 150–450)
POTASSIUM SERPL-SCNC: 3.8 MMOL/L (ref 3.4–5.3)
PROT SERPL-MCNC: 7 G/DL (ref 6.8–8.8)
PROT UR-MCNC: 1.93 G/L
PROT/CREAT 24H UR: 2.96 G/G CR (ref 0–0.2)
RBC # BLD AUTO: 3.7 10E12/L (ref 3.8–5.2)
SODIUM SERPL-SCNC: 141 MMOL/L (ref 133–144)
SPECIMEN SOURCE: ABNORMAL
WBC # BLD AUTO: 6.4 10E9/L (ref 4–11)

## 2018-09-17 PROCEDURE — 97602 WOUND(S) CARE NON-SELECTIVE: CPT

## 2018-09-17 PROCEDURE — 36415 COLL VENOUS BLD VENIPUNCTURE: CPT | Performed by: INTERNAL MEDICINE

## 2018-09-17 PROCEDURE — 25000132 ZZH RX MED GY IP 250 OP 250 PS 637: Mod: GY | Performed by: INTERNAL MEDICINE

## 2018-09-17 PROCEDURE — 25000131 ZZH RX MED GY IP 250 OP 636 PS 637: Mod: GY | Performed by: STUDENT IN AN ORGANIZED HEALTH CARE EDUCATION/TRAINING PROGRAM

## 2018-09-17 PROCEDURE — 25000132 ZZH RX MED GY IP 250 OP 250 PS 637: Mod: GY | Performed by: STUDENT IN AN ORGANIZED HEALTH CARE EDUCATION/TRAINING PROGRAM

## 2018-09-17 PROCEDURE — 00000146 ZZHCL STATISTIC GLUCOSE BY METER IP

## 2018-09-17 PROCEDURE — 87040 BLOOD CULTURE FOR BACTERIA: CPT | Performed by: INTERNAL MEDICINE

## 2018-09-17 PROCEDURE — 25000125 ZZHC RX 250: Performed by: STUDENT IN AN ORGANIZED HEALTH CARE EDUCATION/TRAINING PROGRAM

## 2018-09-17 PROCEDURE — 84156 ASSAY OF PROTEIN URINE: CPT | Performed by: INTERNAL MEDICINE

## 2018-09-17 PROCEDURE — 25000131 ZZH RX MED GY IP 250 OP 636 PS 637: Mod: GY | Performed by: INTERNAL MEDICINE

## 2018-09-17 PROCEDURE — 25000128 H RX IP 250 OP 636: Performed by: STUDENT IN AN ORGANIZED HEALTH CARE EDUCATION/TRAINING PROGRAM

## 2018-09-17 PROCEDURE — 85025 COMPLETE CBC W/AUTO DIFF WBC: CPT | Performed by: INTERNAL MEDICINE

## 2018-09-17 PROCEDURE — 99233 SBSQ HOSP IP/OBS HIGH 50: CPT | Performed by: INTERNAL MEDICINE

## 2018-09-17 PROCEDURE — 25000128 H RX IP 250 OP 636: Performed by: INTERNAL MEDICINE

## 2018-09-17 PROCEDURE — 80053 COMPREHEN METABOLIC PANEL: CPT | Performed by: INTERNAL MEDICINE

## 2018-09-17 PROCEDURE — A9270 NON-COVERED ITEM OR SERVICE: HCPCS | Mod: GY | Performed by: INTERNAL MEDICINE

## 2018-09-17 PROCEDURE — 12000026 ZZH R&B TRANSPLANT

## 2018-09-17 PROCEDURE — A9270 NON-COVERED ITEM OR SERVICE: HCPCS | Mod: GY | Performed by: STUDENT IN AN ORGANIZED HEALTH CARE EDUCATION/TRAINING PROGRAM

## 2018-09-17 RX ORDER — ASPIRIN 81 MG/1
81 TABLET ORAL DAILY
Status: DISCONTINUED | OUTPATIENT
Start: 2018-09-17 | End: 2018-09-19 | Stop reason: HOSPADM

## 2018-09-17 RX ORDER — CEFTRIAXONE 1 G/1
1 INJECTION, POWDER, FOR SOLUTION INTRAMUSCULAR; INTRAVENOUS EVERY 24 HOURS
Status: DISCONTINUED | OUTPATIENT
Start: 2018-09-17 | End: 2018-09-18

## 2018-09-17 RX ORDER — ACETAMINOPHEN 325 MG/1
325-650 TABLET ORAL EVERY 4 HOURS PRN
Status: DISCONTINUED | OUTPATIENT
Start: 2018-09-17 | End: 2018-09-19 | Stop reason: HOSPADM

## 2018-09-17 RX ORDER — GABAPENTIN 300 MG/1
600 CAPSULE ORAL 2 TIMES DAILY
Status: DISCONTINUED | OUTPATIENT
Start: 2018-09-17 | End: 2018-09-19 | Stop reason: HOSPADM

## 2018-09-17 RX ORDER — BACLOFEN 10 MG/1
10 TABLET ORAL 2 TIMES DAILY PRN
Status: DISCONTINUED | OUTPATIENT
Start: 2018-09-17 | End: 2018-09-19 | Stop reason: HOSPADM

## 2018-09-17 RX ORDER — SODIUM CHLORIDE, SODIUM LACTATE, POTASSIUM CHLORIDE, CALCIUM CHLORIDE 600; 310; 30; 20 MG/100ML; MG/100ML; MG/100ML; MG/100ML
INJECTION, SOLUTION INTRAVENOUS CONTINUOUS
Status: DISCONTINUED | OUTPATIENT
Start: 2018-09-17 | End: 2018-09-19 | Stop reason: HOSPADM

## 2018-09-17 RX ADMIN — LEVOTHYROXINE SODIUM 50 MCG: 50 TABLET ORAL at 08:23

## 2018-09-17 RX ADMIN — INSULIN ASPART 2 UNITS: 100 INJECTION, SOLUTION INTRAVENOUS; SUBCUTANEOUS at 02:08

## 2018-09-17 RX ADMIN — CLONIDINE HYDROCHLORIDE 0.1 MG: 0.1 TABLET ORAL at 08:23

## 2018-09-17 RX ADMIN — TACROLIMUS 0.5 MG: 0.5 CAPSULE ORAL at 08:22

## 2018-09-17 RX ADMIN — INSULIN ASPART 1 UNITS: 100 INJECTION, SOLUTION INTRAVENOUS; SUBCUTANEOUS at 17:34

## 2018-09-17 RX ADMIN — FAMOTIDINE 20 MG: 20 TABLET, FILM COATED ORAL at 08:23

## 2018-09-17 RX ADMIN — CLONIDINE HYDROCHLORIDE 0.1 MG: 0.1 TABLET ORAL at 20:26

## 2018-09-17 RX ADMIN — ASPIRIN 81 MG: 81 TABLET, COATED ORAL at 17:34

## 2018-09-17 RX ADMIN — CEFEPIME HYDROCHLORIDE 2 G: 2 INJECTION, POWDER, FOR SOLUTION INTRAVENOUS at 00:13

## 2018-09-17 RX ADMIN — HYDRALAZINE HYDROCHLORIDE 10 MG: 20 INJECTION INTRAMUSCULAR; INTRAVENOUS at 16:19

## 2018-09-17 RX ADMIN — GABAPENTIN 600 MG: 300 CAPSULE ORAL at 20:26

## 2018-09-17 RX ADMIN — TACROLIMUS 2 MG: 1 CAPSULE ORAL at 08:22

## 2018-09-17 RX ADMIN — INSULIN ASPART 1 UNITS: 100 INJECTION, SOLUTION INTRAVENOUS; SUBCUTANEOUS at 06:06

## 2018-09-17 RX ADMIN — TACROLIMUS 1.5 MG: 1 CAPSULE ORAL at 17:34

## 2018-09-17 RX ADMIN — LEVETIRACETAM 750 MG: 750 TABLET, FILM COATED ORAL at 20:26

## 2018-09-17 RX ADMIN — CEFTRIAXONE 1 G: 1 INJECTION, POWDER, FOR SOLUTION INTRAMUSCULAR; INTRAVENOUS at 09:13

## 2018-09-17 RX ADMIN — INSULIN ASPART 3 UNITS: 100 INJECTION, SOLUTION INTRAVENOUS; SUBCUTANEOUS at 22:42

## 2018-09-17 RX ADMIN — SODIUM CHLORIDE, POTASSIUM CHLORIDE, SODIUM LACTATE AND CALCIUM CHLORIDE: 600; 310; 30; 20 INJECTION, SOLUTION INTRAVENOUS at 14:43

## 2018-09-17 RX ADMIN — LEVETIRACETAM 750 MG: 750 TABLET, FILM COATED ORAL at 08:22

## 2018-09-17 RX ADMIN — Medication 150 MG: at 08:23

## 2018-09-17 RX ADMIN — PREDNISONE 5 MG: 5 TABLET ORAL at 08:23

## 2018-09-17 RX ADMIN — Medication 150 MG: at 20:32

## 2018-09-17 RX ADMIN — INSULIN GLARGINE 18 UNITS: 100 INJECTION, SOLUTION SUBCUTANEOUS at 22:42

## 2018-09-17 ASSESSMENT — ACTIVITIES OF DAILY LIVING (ADL)
ADLS_ACUITY_SCORE: 29
ADLS_ACUITY_SCORE: 29
ADLS_ACUITY_SCORE: 31
ADLS_ACUITY_SCORE: 31
ADLS_ACUITY_SCORE: 29
ADLS_ACUITY_SCORE: 27

## 2018-09-17 NOTE — PROVIDER NOTIFICATION
On-call MD paged regarding the fact that the writer of this note received a call from lab stating the patients blood cultures drawn yesterday from the L arm is positive for staph species. Pt currently receiving scheduled Vancomycin and Cefepime.    Will continue to monitor and inform MD about changes in the patients condition.     Addendum: MD paged regarding patients scheduled dose of Lantus. Due to the fact that the patient is NPO, writer of this note was wondering if Lantus should be administered. Per MD, administer the Lantus due to the fact that the patient is having high blood sugars (200s). MD also paged due to the fact that the patient's urine output has been decreasing. MD placed order for LR at 75 mL/hr.
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
## 2018-09-17 NOTE — PROGRESS NOTES
[facility] Nurse Inpatient Pressure Injury Assessment   Reason for consultation: Evaluate and treat Sacral pressure injury      ASSESSMENT  Pressure Injury: on sacrum , present on admission ,   Pressure Injury is Stage 1   Contributing factor of the pressure injury: pressure and immobility  Status: initial assessment     TREATMENT PLAN  Plan of care for wound located on the sacrum Cleanse with MicroKlenz moisten gauze   Pat dry. Apply no sting barrier film to the  brandie wound skin allow to dry   Cover with Sacral  Mepilex dressing  Change every other day and as needed  ;  Orders Written  WO Nurse follow-up plan:weekly  Nursing to notify the Provider(s) and re-consult the WO Nurse if wound(s) deteriorates or new skin concern.    Patient History  According to provider note(s): Gem Jade is a 72 year old female who has a history of type 2 diabetes, polyneuropathy, kidney transplant (1999), recurrent UTI, pulmonary embolism, c diff infection, morbid obesity and seizure disorder and is admitted for altered mental status for the last 2 days presumed to be due to catheter associated UTI. She arrived to the ED via EMS and upon initial examination was unresponsive which per chart review is how she has presented in the past with urinary tract infections. She has a chronic rico in place which was changed in the ED. She lives at home with her  who is her main caregiver. He was not bedside or available for interview. Per chart review,  told EMS this is her typical state when she gets a UTI. Labs showed ROB with Cr of 2.1, normal WBC and urine with luekesterase, nitrites, moderate bacteria and 286 WBC. Normal lactate. She was given IV cefepime 2 g in the ED and admitted to medicine for further management.   Objective Data  Containment of urine/stool: Diaper    Current Diet/ Nutrition:    Active Diet Order      Regular Diet Adult    Output:   I/O last 3 completed shifts:  In: 2536.5 [P.O.:809; I.V.:1727.5]  Out:  1075 [Urine:1075]    Risk Assessment:   Sensory Perception: 3-->slightly limited  Moisture: 3-->occasionally moist  Activity: 2-->chairfast  Mobility: 2-->very limited  Nutrition: 2-->probably inadequate  Friction and Shear: 1-->problem  Albino Score: 13      Labs:   Recent Labs  Lab 09/17/18  1251 09/16/18  0751   ALBUMIN 2.6* 2.8*   HGB 11.0* 11.4*   WBC 6.4 6.4   A1C  --  8.2*       Physical Exam  Skin inspection: focused Sacrum        Wound Location:  Sacrum  Date of last Photo 9/17/2018  Wound History: A stage 1 pressure injury present on admission   Measurements (length x width x depth, in cm) 1.5 cm x 1 cm  x  0 cm   Wound Base:  100 % non-blanchable erythema  Tunneling N/A  Undermining N/A  Palpation of the wound bed: normal   Periwound skin: intact  Color: normal and consistent with surrounding tissue  Temperature: normal   Drainage:, none  Odor: none  Pain: , tender    Interventions  Current support surface: Standard  Atmos Air mattress  Current off-loading measures: Foam padding  Repositioning aid: Pillows  Visual inspection of wound(s) completed   Wound Care: was done per plan of care.  Supplies: gathered and placed at the bedside  Educated provided: plan of care  Education provided to: patient  and nurse  Discussed importance of:repositioning every 2 hours, off-loading pressure to wound, their role in pressure injury prevention, dressing change frequency and moisture management  Discussed plan of care with Patient    Titus Santos MSN, RN, CNP-FNP, CWOCN
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
## 2018-09-17 NOTE — PROGRESS NOTES
Clinic Care Coordination Contact  Care Team Conversations    Received notification that patient is admitted to hospital for AMS r/t UTI. Will ask community paramedic team to see patient once discharged r/t difficulty for  to bring patient to clinic.     Porsha Khalil R.N.  Clinic Care Coordinator  [facility]  [phone]

## 2018-09-17 NOTE — PLAN OF CARE
Problem: Patient Care Overview  Goal: Plan of Care/Patient Progress Review  Outcome: No Change  5065-7875: Afebrile. Pt slightly hypertensive (150s-160s/80s), pt did not meet parameters to administer PRN Hydralazine. OVSS on RA. Pt complaining of some discomfort on her coccyx and L posterior thigh, pt turned and repositioned. Pt NPO with ice chips and medications, no complaints of nausea. BS checks every 4 hours. BS check around 2200 was 285, 3 units of insulin given per order parameters. BS check around 0200 was 236, 2 units of insulin given per order parameters. BS check around 0600 was 163, 1 unit of insulin given per order parameters. PIV R ankle SL. PIV L Forearm infusing with LR at 75 mL/hr. Johnson catheter with adequate amounts of urine output, 525 mL emptied this shift. No BM this shift, pt is passing flatus. Coccyx red with some dry skin peeling, barrier cream and Mepilex dressing applied. L posterior thigh skin tear, red with no drainage, Mepilex dressing applied. WOC consult placed.     Neuro checks every 4 hours. Pt has been disoriented to time. Pt has been able to tell the writer of this note her name and . Pt has been able to tell the writer of this note that she is in the hospital for a UTI. However, pt has been stating that she is seeing other individuals in the room. Pt on bed alarm overnight for patient safety, pt is bed bound and has not set off the alarm. Pt moves in bed with assist x2. Call light in reach. Will continue to monitor and follow plan of care.

## 2018-09-17 NOTE — PROGRESS NOTES
Antimicrobial Stewardship Team Note    Antimicrobial Stewardship Program - A joint venture between [facility] and  Physicians to optimize antibiotic management.  NOT a formal consult - Restricted Antimicrobial Review     Patient: Gem Jade  MRN: [id number]  Allergies: Bactrim ds [sulfamethoxazole w/trimethoprim]; Atorvastatin calcium; Codeine; Darvocet [propoxyphene n-apap]; Hydromorphone; Levofloxacin; Morphine; Niaspan [niacin]; Percocet [oxycodone-acetaminophen]; and Vicodin [hydrocodone-acetaminophen]    Brief Summary: Gem Jade is a 72 year old female who has a history of type 2 diabetes, polyneuropathy, kidney transplant (1999), recurrent UTI, pulmonary embolism, c diff infection, morbid obesity and seizure disorder and is admitted for worsening altered mental status and lethargy over 2 days PTA presumed to be due to catheter associated UTI.     HPI: Pt arrived to the ED via EMS in the evening of 9/15 and upon initial examination was unresponsive which per H&P is how she has presented in the past with urinary tract infections. She had a chronic rico in place since 5/27/18 which was changed in the ED.  On admit, pt had a WBC of 8.2, afebrile, VSS wnl, LA 1.2.  UA + for large LE, nitrite, moderate bacteria and 286 WBC. She was given cefepime 2 g IV in the ED and admitted to medicine for further management. Since admit, pt has remained afebrile (w/o antipyretics) and VSS w/ a few hypertensive instances. UCx collected 9/13 from [facility] 10[facility] of 2 strains of E coli. 9/15 BCx from foot ngtd. 9/15 BCx from left arm grew staph hominis on verigene. Both were collected before abx admin. UCx collected 9/16 ~20min after starting cefepime grew >100K E coli. Vanco was added after + BCx. On 9/17 in AM cefepime was narrowed to ceftriaxone, and vanco dc'ed.            Active Anti-infective Medications                Start     Stop      09/17/18 0800  cefTRIAXone  1 g,   Intravenous,    EVERY 24 HOURS     Urinary Tract Infection        --    09/1945  Vancomycin Place Chaves - Receiving Intermittent Dosing  1 each,   Does not apply,   SEE ADMIN INSTRUCTIONS     Bacteremia        --          Assessment: Complicated urinary tract infection. Based on pt's clinical status, only 1/2 BCx +, and hx of recurrent UTIs with AMS, pt's staph hominis is likely a contaminant and pt likely has solely a UTI and not a bacteremia. Pt does not need to stay on vancomycin at this time, and agree with team's decision to dc vanco. Due to pt having a complicated UTI, would recommend a treatment duration of 15 days ending 9/30. Pt is currently taking other PO medications, and recommend switching to oral antibiotic therapy pending susceptibilities and improving mental status. Per [facility] antibiogram, E coli is 99% susceptible to ceftriaxone, so a good option would be an oral 3rd generation cephalosporin such as cefdinir or cefpodoxime. Cefdinir dosing would be 300mg PO q12h, and cefpodoxime dosing would be 200mg PO q12h. Bactrim and levofloxacin would also be options, but both are listed in pt's allergies (SCr increase w/ bactrim, diarrhea and hallucinations w/ levofloxacin), though aren't true allergies. Would avoid ciprofloxacin due to being in the same class as levofloxacin and risk for worsening pt's mental status.    Recommendations:  1) Continue ceftriaxone pending susceptibilities and improving mental status, then switch to oral antibiotics.   2) Continue antibiotics for a 15 day duration ending 9/30.    Discussed with ID Staff Dr. Yohana Powell MD and Aubree Craft, VivianD    [facility]  Pharmacy Student  [facility] [employer]  Pager: [phone]    Vital Signs/Clinical Features:  Vitals       09/15 0700  -  09/16 0659 09/16 0700  -  09/17 0659 09/17 0700  -  09/17 1510   Most Recent    Temp ( F)   97.5 -  98.5      98.6     98.6 (37)    Pulse 66 -  70    65 -  76       76    Heart Rate  66 -  69    59 -  66    61 -  67     61    Resp 10 -  17    (!)7 -  12      12     12    /75 -  (!) 204/86    135/63 -  (!) 195/91    128/46 -  147/66     128/46    SpO2 (%) (!)86 -  100    97 -  100      99     99          Labs  Estimated Creatinine Clearance: 36 mL/min (based on Cr of 1.72).  Recent Labs   Lab Test  08/09/18   1225  09/05/18   1230  09/13/18   1335  09/15/18   2347  09/16/18   0751  09/17/18   1251   CR  1.31*  1.54*  2.02*  2.10*  1.88*  1.72*       Recent Labs   Lab Test  07/28/18   1435  08/09/18   1225  09/05/18   1230  09/13/18   1335  09/15/18   2302  09/16/18   0751  09/17/18   1251   WBC  5.2  5.6  5.2  8.2  5.7  6.4  6.4   ANEU  2.9  2.9  2.5  6.2  4.3   --   4.2   ALYM  1.7  1.8  2.1  1.2  0.7*   --   1.2   CARROLL  0.4  0.5  0.4  0.6  0.5   --   0.6   AEOS  0.1  0.3  0.2  0.1  0.0   --   0.1   HGB  11.2*  11.5*  11.5*  10.5*  13.5  11.4*  11.0*   HCT  35.3  35.7  36.3  33.3*  42.0  36.0  33.6*   MCV  95  92  93  92  93  93  91   PLT  137*  156  150  141*  187  161  150       Recent Labs   Lab Test  01/20/18   0720  05/25/18   0126  05/28/18   0520  07/05/18   1418  09/15/18   2347  09/16/18   0751  09/17/18   1251   BILITOTAL  0.4  0.5  0.3  0.3  0.3   --   0.2   ALKPHOS  106  88  59  91  71   --   54   ALBUMIN  2.6*  3.0*  2.4*  3.5  3.0*  2.8*  2.6*   AST  22  8  17  27  5   --   6   ALT  20  17  14  42  10   --   9       Recent Labs   Lab Test  12/29/14   1330  04/28/15   1650   02/18/16   1436   05/25/16   2116   08/31/16   0801   03/08/17   1243   01/11/18   1502   01/18/18   1420  01/18/18   1532  01/19/18   0445  02/09/18   1530  05/25/18   0126  05/26/18   0545  07/05/18   1418  07/05/18   1515  07/28/18   1435  09/13/18   1335  09/15/18   2347   PCAL   --    --    --    --    --    --    < >  0.09   --    --    < >   --    < >   --   0.12  0.06  0.07   --    --   0.07   --   0.05  0.48   --    LACT   --    --    < >   --    < >   --    < >   --    < >  0.6*   < >  0.7   --    0.9   --    --    --   1.0  1.0   --   1.4   --    --   1.2   CRP  114.0*  145.0*   --   8.8*   --   <2.9   --   39.0*   --   7.5   --    --    --    --    --    --    --    --    --    --    --    --    --    --    SED   --    --    --    --    --   53*   --    --    --    --    --    --    --    --    --    --    --    --    --    --    --    --    --    --     < > = values in this interval not displayed.       Recent Labs   Lab Test  07/16/13   1220   08/21/17   1210   12/20/17   1835   09/16/18   0751   VANCOMYCIN   --    < >   --    --   20.1   --    --    TACROL  3.6*   < >  <3.0*   < >   --    < >  11.1   RAPAMY   --    < >  <2.0*   --    --    --    --    MPACID  3.13   --    --    --    --    --    --    MPAG  98.3*   --    --    --    --    --    --     < > = values in this interval not displayed.       Culture Results:  7-Day Micro Results     Procedure Component Value Units Date/Time    Blood culture [] Collected:  09/17/18 0816    Order Status:  Completed Lab Status:  Preliminary result Updated:  09/17/18 1446    Specimen:  Blood      Specimen Description Blood Right Hand     Special Requests Received in aerobic bottle only     Culture Micro No growth after 3 hours    Blood culture [] Collected:  09/17/18 0810    Order Status:  Completed Lab Status:  Preliminary result Updated:  09/17/18 1446    Specimen:  Blood      Specimen Description Blood Left Hand     Special Requests Received in aerobic bottle only     Culture Micro No growth after 3 hours    Urine Culture Aerobic Bacterial [[id number]]  (Abnormal) Collected:  09/16/18 0026    Order Status:  Completed Lab Status:  Preliminary result Updated:  09/17/18 0523    Specimen:  Catheterized Urine      Specimen Description Catheterized Urine     Special Requests Specimen received in preservative     Culture Micro >100,000 colonies/mL  Escherichia coli  Susceptibility testing in progress   (A)    Blood culture [[id number]]  (Abnormal) Collected:   09/15/18 2347    Order Status:  Completed Lab Status:  Preliminary result Updated:  09/17/18 1130    Specimen:  Blood from Arm      Specimen Description Blood Left Arm     Special Requests Received in aerobic bottle only     Culture Micro Cultured on the 1st day of incubation:  Staphylococcus hominis   (A)      Critical Value/Significant Value, preliminary result only, called to and read back by  Armida Cardenas RN at 1835 on 9/16/18 by MARNI.        Susceptibility testing in progress      (Note)  POSITIVE for Staphylococci other than S.aureus, S.epidermidis and  S.lugdunensis, by [email or web address] multiplex nucleic acid test.  Coagulase-negative staphylococci are the most common venipuncture or  collection associated skin CONTAMINANTS grown in blood cultures.  Final identification and antimicrobial susceptibility testing will be  verified by standard methods.    Specimen tested with Verigene multiplex, gram-positive blood culture  nucleic acid test for the following targets: Staph aureus, Staph  epidermidis, Staph lugdunensis, other Staph species, Enterococcus  faecalis, Enterococcus faecium, Streptococcus species, S. agalactiae,  S. anginosus grp., S. pneumoniae, S. pyogenes, Listeria sp., mecA  (methicillin resistance) and Viridiana/B (vancomycin resistance).    Critical Value/Significant Value called to and read back by Ruba Thompson RN at 2115 on 9/16/18 by MARNI.      Blood culture [[id number]] Collected:  09/15/18 2302    Order Status:  Completed Lab Status:  Preliminary result Updated:  09/17/18 0402    Specimen:  Blood from Foot, Right      Specimen Description Blood Right Ankle     Special Requests Received in aerobic bottle only     Culture Micro No growth after 2 days    Urine Culture Aerobic Bacterial [[id number]]  (Abnormal)  (Susceptibility) Collected:  09/13/18 1335    Order Status:  Completed Lab Status:  Final result Updated:  09/16/18 0557    Specimen:  Catheterized Urine      Specimen Description Catheterized Urine      Special Requests Specimen received in preservative     Culture Micro 10,000 to 50,000 colonies/mL  Escherichia coli   (A)      10,000 to 50,000 colonies/mL  Strain 2  Escherichia coli   (A)    Culture & Susceptibility     ESCHERICHIA COLI     Antibiotic Interpretation Sensitivity Unit Method Status    AMPICILLIN Sensitive 8 ug/mL SHEELA Final    AMPICILLIN/SULBACTAM Sensitive 4 ug/mL SHEELA Final    CEFAZOLIN Sensitive <=4 ug/mL SHEELA Final    Comment: Cefazolin SHEELA breakpoints are for the treatment of uncomplicated urinary tract   infections.  For the treatment of systemic infections, please contact the   laboratory for additional testing.    CEFEPIME Sensitive <=1 ug/mL SHEELA Final    CEFOXITIN Sensitive <=4 ug/mL SHEELA Final    CEFTAZIDIME Sensitive <=1 ug/mL SHEELA Final    CEFTRIAXONE Sensitive <=1 ug/mL SHEELA Final    CIPROFLOXACIN Sensitive <=0.25 ug/mL SHEELA Final    GENTAMICIN Sensitive <=1 ug/mL SHEELA Final    LEVOFLOXACIN Sensitive <=0.12 ug/mL SHEELA Final    NITROFURANTOIN Sensitive <=16 ug/mL SHEELA Final    Piperacillin/Tazo Sensitive <=4 ug/mL SHEELA Final    TOBRAMYCIN Sensitive <=1 ug/mL SHEELA Final    Trimethoprim/Sulfa Sensitive <=1/19 ug/mL SHEELA Final              ESCHERICHIA COLI     Antibiotic Interpretation Sensitivity Unit Method Status    AMPICILLIN Sensitive 8 ug/mL SHEELA Final    AMPICILLIN/SULBACTAM Sensitive <=2 ug/mL SHEELA Final    CEFAZOLIN Sensitive <=4 ug/mL SHEELA Final    Comment: Cefazolin SHEELA breakpoints are for the treatment of uncomplicated urinary tract   infections.  For the treatment of systemic infections, please contact the   laboratory for additional testing.    CEFEPIME Sensitive <=1 ug/mL SHEELA Final    CEFOXITIN Sensitive <=4 ug/mL SHEELA Final    CEFTAZIDIME Sensitive <=1 ug/mL SHEELA Final    CEFTRIAXONE Sensitive <=1 ug/mL SHEELA Final    CIPROFLOXACIN Sensitive <=0.25 ug/mL SHEELA Final    GENTAMICIN Sensitive <=1 ug/mL SHEELA Final    LEVOFLOXACIN Sensitive <=0.12 ug/mL SHEELA Final    NITROFURANTOIN Sensitive <=16  ug/mL SHEELA Final    Piperacillin/Tazo Sensitive <=4 ug/mL SHEELA Final    TOBRAMYCIN Sensitive <=1 ug/mL SHEELA Final    Trimethoprim/Sulfa Sensitive <=1/19 ug/mL SHEELA Final                             Recent Labs   Lab Test  02/09/18   1530  05/25/18   0155  07/05/18   1545  07/28/18   1450  09/15/18   2303   URINEPH  6.0  5.5  6.0  5.5  5.5   NITRITE  Negative  Positive*  Positive*  Negative  Positive*   LEUKEST  Large*  Large*  Large*  Large*  Large*   WBCU  >182*  >182*  >182*  24*  286*       Recent Labs   Lab Test  09/02/16   0940  05/30/18   0845   CWBC  0  Duplicate request  Test not indicated.    Canceled, Test credited  0   CRBC  2  Duplicate request  Test not indicated.    Canceled, Test credited  3*   CGLU  89*  100*   CTP  44  48       Recent Labs   Lab Test  01/13/18   1612   RVSPEC  Nasal   IFLUA  Negative   FLUAH1  Negative   FLUAH3  Negative   TO9841  Negative   IFLUB  Negative   RSVA  Negative   RSVB  Negative   PIV1  Negative   PIV2  Negative   PIV3  Negative   HMPV  Negative   HRVS  Negative   ADVBE  Negative   ADVC  Negative       Recent Labs   Lab Test  01/23/18   1258   CDBPCT  Negative       Imaging:  Renal Transplant    Result Date: 9/16/2018  EXAMINATION:  RENAL TRANSPLANT,  9/16/2018 6:34 AM COMPARISON: 4/20/2017 HISTORY: Acute kidney injury TECHNIQUE:  Grey-scale, color Doppler and spectral flow analysis. FINDINGS: The transplant kidney is located in the right lower quadrant, and measures 12.7 cm length. Parenchyma demonstrates mild areas of thinning and slightly increased echogenicity. No focal lesions. No hydronephrosis. No perinephric fluid collection. Renal artery flow: 65 cm/sec peak systolic at hilum. 60 cm/sec peak systolic at anastomosis. Lower Pole Arcuate artery: 0.73 resistive index. Mid pole Arcuate artery: 0.82 resistive index. Upper Pole Arcuate artery: 0.87 resistive index. The previously described swirling artifact in the upper pole is not demonstrated on these images.  Renal Vein Flow: 34 cm/sec at hilum. 34 cm/sec at anastomosis. Iliac artery flow: 131 cm/sec peak systolic above anastomosis. 66 cm/sec peak systolic below anastomosis. Iliac vein flow: Patent above and below the anastomosis.     IMPRESSION: 1.  Right lower quadrant transplant kidney with mild areas of parenchymal thinning and slightly increased echogenicity. No hydronephrosis or perinephric fluid collection. 2.  Mildly elevated resistive indices of the arcuate arteries, decreased from 4/20/2017. This is nonspecific and can be seen with medical renal disease, acute tubular necrosis, and rejection. I have personally reviewed the examination and initial interpretation and I agree with the findings. MAIKEL CARRERO MD    Chest  Xr, 1 View Portable    Result Date: 9/16/2018  EXAM: XR CHEST PORT 1 VW  9/15/2018 10:33 PM  HISTORY: altered mental status; COMPARISON: 7/5/2018 FINDINGS: AP image of the chest. Trachea is midline. The cardiac silhouette is within normal limits. Shallow inspiration. No large pleural effusion or pneumothorax. Unchanged chronic left retrocardiac opacities, probably prominent vasculature. No acute airspace opacities identified. Upper abdomen is unremarkable. No acute bony findings.     IMPRESSION: No acute airspace opacities. I have personally reviewed the examination and initial interpretation and I agree with the findings. MAIKEL CARRERO MD    Ct Head W/o Contrast    Result Date: 9/16/2018  CT HEAD W/O CONTRAST 9/16/2018 2:46 AM Provided History: altered mental status; Comparison: CT abdomen 5/20/2018. Technique: Using multidetector thin collimation helical acquisition technique, axial, coronal and sagittal CT images from the skull base to the vertex were obtained without intravenous contrast. Findings:  No intracranial hemorrhage, mass effect, or midline shift. The ventricles are proportionate to the cerebral sulci. Unchanged moderate global cerebral volume loss and mild leukoaraiosis. The  gray to white matter differentiation of the cerebral hemispheres is preserved. The basal cisterns are patent. The visualized paranasal sinuses are clear. The mastoid air cells are clear.      Impression: No acute intracranial pathology. I have personally reviewed the examination and initial interpretation and I agree with the findings. JAMSHID PELAYO MD

## 2018-09-17 NOTE — PROGRESS NOTES
General acute hospital, [place]    Internal Medicine Progress Note - Gold Service      Assessment & Plan   Gem Jade is a 72 year old female admitted on 9/15/2018. She has a history of DMII, polyneuropathy, kidney transplant in 1999, and recurrent UTIs secondary to indwelling rico and is admitted for sepsis secondary to UTI with altered mental status    Plan Today:  - Urine growing e coli. Has been pan-sensitive in the past  - De-escalate antibiotics to ceftriaxone, pending susceptibilities likely can transition to oral soon  - Blood cultures overnight growing CoNS with negatie Chata and MecA genes, susceptible to ceftriaxone    # Severe Sepsis secondary to E coli Urinary Tract Infection -  Toxic metabolic encephalopathy on admission likely caused by urinary tract infection. Head CT negative on admission. Has a history of recurrent infections with indwelling rico, all e coli have been pan-sensitive in the past. Started of cefepime on admission.   - Transition to ceftriaxone for UTI  - Follow admission cultures  - Repeat blood cultures this AM after CoNS growing    # CoNS Bacteremia - negative for vancA and mecA genes. Not staph aureus. Drawn before cefepime. Unclear if contaminant or true pathogen but sensitive to ceftriaxone.  - Repeat cultures  - Ceftriaxone as above    # Acute Kidney Injury in prior Kidney Transplant - Baseline creatinine appears to be between 1.2-1.5 and on admission up to 2.10 likely secondary to sepsis. Trended down with fluids.   - Transplant Nephrology following  - Continue prednisone 5 mg and tacrolimus 2.5 mg BID  - US of Transplanted Kidney without hydronephrosis  - Can restart baclofen given improvement in mental status and improvement in kidney function.  - Tacrolimus levels    # Hypertension   # Paroxysymal Atrial Fibrillation  - Continue clonidine 0.1 mg BID  - Continue labetalol 150 mg BID    #DMII  Home regimen is novolog TID with meals and lantus 22 units  nightly. Glucose elevated in the ED, however, patient had not received evening medications.  - lantus 18 units nightly as patient is NPO  - sliding scale insulin prn    Diet: Regular Diet Adult  Fluids: LR   Johnson Catheter: in place, indication: retention    DVT Prophylaxis: Pneumatic Compression Devices  Code Status: Full Code    Disposition Plan   Expected discharge: 1-2 days, recommended to transitional care unit once mental status at baseline and SIRS/Sepsis treated.     Entered: Miguelito Camarena MD 09/17/2018, 1:33 PM   Information in the above section will display in the discharge planner report.      The patient's care was discussed with the Bedside Nurse, Care Coordinator/ and Patient.    Miguelito Camarena MD  Internal Medicine Staff Hospitalist Service  [facility]  Pager: [phone]  Please see sticky note for cross cover information    Interval History   No acute events overnight. This morning feeling much better. Awake and alert but reportedly seeing people in her room that are not there. Knows they are hallucinations however. No abdominal pain, difficulty breathing, nausea or vomiting. No fevers.     4 Point Review of Systems otherwise negative.       Data reviewed today: I reviewed all medications, new labs and imaging results over the last 24 hours. I personally reviewed no images or EKG's today.    Physical Exam   Vital Signs: Temp: 98.6  F (37  C) Temp src: Oral BP: 128/46 Pulse: 76 Heart Rate: 61 Resp: 12 SpO2: 99 % O2 Device: None (Room air)    Weight: 223 lbs 4.8 oz  General Appearance: Obese woman in no acute distress  Respiratory: CTA anteriorly, no wheeze  Cardiovascular: RRR, no murmurs, rubs, or gallops  GI: soft, obese, nontender  Skin: no rashes          Data   Data     Recent Labs  Lab 09/17/18  1251 09/16/18  0751 09/15/18  2347  09/15/18  2302   WBC 6.4 6.4  --   --  5.7   HGB 11.0* 11.4*  --   --  13.5   MCV 91 93  --   --  93    161  --   --   187    138 135  --   --    POTASSIUM 3.8 4.2 4.0  --   --    CHLORIDE 111* 107 102  --   --    CO2 22 21 24  --   --    BUN 50* 60* 62*  --   --    CR 1.72* 1.88* 2.10*  --   --    ANIONGAP 8 10 9  --   --    ROGER 8.8 9.0 9.4  --   --    * 308* 411*  --   --    ALBUMIN 2.6* 2.8* 3.0*  < >  --    PROTTOTAL 7.0  --  8.3  --   --    BILITOTAL 0.2  --  0.3  --   --    ALKPHOS 54  --  71  --   --    ALT 9  --  10  --   --    AST 6  --  5  --   --    < > = values in this interval not displayed.

## 2018-09-17 NOTE — PHARMACY-VANCOMYCIN DOSING SERVICE
Pharmacy- Vancomycin Initiation    Pharmacy has received a consult to initiate vancomycin for this patient.      Gem Jade is a 72 year old female with an order for vancomycin to be initiated on September 16, 2018.    Vancomycin Indication: Bacteremia - GPCs  Goal Trough Level: 15-20 mg/L    Renal Function: Improving    CrCl = Estimated Creatinine Clearance: 33 mL/min (based on Cr of 1.88).    Assessment/Plan:      1) Recommend starting vancomycin 2000 mg IV x 1 followed by intermittent dosing.  2) Serum creatinine levels have been ordered daily for the first week of therapy and at least twice weekly for subsequent weeks.  We will follow patient s renal function and clinical response while the patient receives vancomycin.    3) If therapy is formally approved beyond 48 hours by the infectious disease or antimicrobial team, a vancomycin trough level will be set up and evaluated.    Sharmila Dugan, PGY-1 Pharmacy Resident

## 2018-09-17 NOTE — PROGRESS NOTES
COMMUNITY PARAMEDICS FOLLOW UP/ MAINTENANCE VISIT    September 14, 2018 [id number]    SUBJECTIVE:                                                      Gem Jade is a 72 year old female being seen at home for a Community Paramedic Home Visit.    Patient was seen along with PCP Marivel Okeefe.    Acute concern/Follow-up issue: Co-Visit with PCP, AMS, infection.    Recent blood sugars: 457  Physical Exam                                                    /76  Pulse 62  Temp 96.8  F (36  C) (Temporal)  Resp 18  SpO2 98%      Conclusion                                                      Next CP visit scheduled: 10/10/18    Issues for Provider to follow up on: MAIKOL    Provider follow up visit needed: MAIKOL Luna on 9/17/2018 at 6:34 AM

## 2018-09-18 ENCOUNTER — APPOINTMENT (OUTPATIENT)
Dept: OCCUPATIONAL THERAPY | Facility: CLINIC | Age: 73
DRG: 698 | End: 2018-09-18
Attending: INTERNAL MEDICINE
Payer: MEDICARE

## 2018-09-18 LAB
ANION GAP SERPL CALCULATED.3IONS-SCNC: 8 MMOL/L (ref 3–14)
BACTERIA SPEC CULT: ABNORMAL
BUN SERPL-MCNC: 50 MG/DL (ref 7–30)
CALCIUM SERPL-MCNC: 8.9 MG/DL (ref 8.5–10.1)
CHLORIDE SERPL-SCNC: 107 MMOL/L (ref 94–109)
CO2 SERPL-SCNC: 22 MMOL/L (ref 20–32)
CREAT SERPL-MCNC: 1.64 MG/DL (ref 0.52–1.04)
ERYTHROCYTE [DISTWIDTH] IN BLOOD BY AUTOMATED COUNT: 13.5 % (ref 10–15)
GFR SERPL CREATININE-BSD FRML MDRD: 31 ML/MIN/1.7M2
GLUCOSE BLDC GLUCOMTR-MCNC: 151 MG/DL (ref 70–99)
GLUCOSE BLDC GLUCOMTR-MCNC: 163 MG/DL (ref 70–99)
GLUCOSE BLDC GLUCOMTR-MCNC: 171 MG/DL (ref 70–99)
GLUCOSE BLDC GLUCOMTR-MCNC: 175 MG/DL (ref 70–99)
GLUCOSE BLDC GLUCOMTR-MCNC: 179 MG/DL (ref 70–99)
GLUCOSE BLDC GLUCOMTR-MCNC: 187 MG/DL (ref 70–99)
GLUCOSE SERPL-MCNC: 195 MG/DL (ref 70–99)
HCT VFR BLD AUTO: 35.1 % (ref 35–47)
HGB BLD-MCNC: 10.9 G/DL (ref 11.7–15.7)
Lab: ABNORMAL
MCH RBC QN AUTO: 28.7 PG (ref 26.5–33)
MCHC RBC AUTO-ENTMCNC: 31.1 G/DL (ref 31.5–36.5)
MCV RBC AUTO: 92 FL (ref 78–100)
PLATELET # BLD AUTO: 161 10E9/L (ref 150–450)
POTASSIUM SERPL-SCNC: 3.7 MMOL/L (ref 3.4–5.3)
PTH-INTACT SERPL-MCNC: 104 PG/ML (ref 18–80)
RBC # BLD AUTO: 3.8 10E12/L (ref 3.8–5.2)
SODIUM SERPL-SCNC: 138 MMOL/L (ref 133–144)
SPECIMEN SOURCE: ABNORMAL
TACROLIMUS BLD-MCNC: 10.4 UG/L (ref 5–15)
TME LAST DOSE: NORMAL H
WBC # BLD AUTO: 7.4 10E9/L (ref 4–11)

## 2018-09-18 PROCEDURE — 97110 THERAPEUTIC EXERCISES: CPT | Mod: GO

## 2018-09-18 PROCEDURE — 85027 COMPLETE CBC AUTOMATED: CPT | Performed by: INTERNAL MEDICINE

## 2018-09-18 PROCEDURE — 40000556 ZZH STATISTIC PERIPHERAL IV START W US GUIDANCE

## 2018-09-18 PROCEDURE — 36415 COLL VENOUS BLD VENIPUNCTURE: CPT | Performed by: INTERNAL MEDICINE

## 2018-09-18 PROCEDURE — 83970 ASSAY OF PARATHORMONE: CPT | Performed by: INTERNAL MEDICINE

## 2018-09-18 PROCEDURE — 87799 DETECT AGENT NOS DNA QUANT: CPT | Performed by: INTERNAL MEDICINE

## 2018-09-18 PROCEDURE — A9270 NON-COVERED ITEM OR SERVICE: HCPCS | Mod: GY | Performed by: STUDENT IN AN ORGANIZED HEALTH CARE EDUCATION/TRAINING PROGRAM

## 2018-09-18 PROCEDURE — 80197 ASSAY OF TACROLIMUS: CPT | Performed by: INTERNAL MEDICINE

## 2018-09-18 PROCEDURE — 00000146 ZZHCL STATISTIC GLUCOSE BY METER IP

## 2018-09-18 PROCEDURE — 97165 OT EVAL LOW COMPLEX 30 MIN: CPT | Mod: GO

## 2018-09-18 PROCEDURE — 12000026 ZZH R&B TRANSPLANT

## 2018-09-18 PROCEDURE — 25000132 ZZH RX MED GY IP 250 OP 250 PS 637: Mod: GY | Performed by: INTERNAL MEDICINE

## 2018-09-18 PROCEDURE — 97535 SELF CARE MNGMENT TRAINING: CPT | Mod: GO

## 2018-09-18 PROCEDURE — 97530 THERAPEUTIC ACTIVITIES: CPT | Mod: GO

## 2018-09-18 PROCEDURE — 25000132 ZZH RX MED GY IP 250 OP 250 PS 637: Mod: GY | Performed by: STUDENT IN AN ORGANIZED HEALTH CARE EDUCATION/TRAINING PROGRAM

## 2018-09-18 PROCEDURE — 25000128 H RX IP 250 OP 636: Performed by: INTERNAL MEDICINE

## 2018-09-18 PROCEDURE — 40000133 ZZH STATISTIC OT WARD VISIT

## 2018-09-18 PROCEDURE — 99232 SBSQ HOSP IP/OBS MODERATE 35: CPT | Performed by: INTERNAL MEDICINE

## 2018-09-18 PROCEDURE — A9270 NON-COVERED ITEM OR SERVICE: HCPCS | Mod: GY | Performed by: INTERNAL MEDICINE

## 2018-09-18 PROCEDURE — 80048 BASIC METABOLIC PNL TOTAL CA: CPT | Performed by: INTERNAL MEDICINE

## 2018-09-18 PROCEDURE — 25000131 ZZH RX MED GY IP 250 OP 636 PS 637: Mod: GY | Performed by: INTERNAL MEDICINE

## 2018-09-18 PROCEDURE — 25000125 ZZHC RX 250: Performed by: STUDENT IN AN ORGANIZED HEALTH CARE EDUCATION/TRAINING PROGRAM

## 2018-09-18 RX ORDER — CEFDINIR 300 MG/1
300 CAPSULE ORAL 2 TIMES DAILY
Status: DISCONTINUED | OUTPATIENT
Start: 2018-09-19 | End: 2018-09-19 | Stop reason: HOSPADM

## 2018-09-18 RX ADMIN — LEVETIRACETAM 750 MG: 750 TABLET, FILM COATED ORAL at 09:25

## 2018-09-18 RX ADMIN — TACROLIMUS 1.5 MG: 1 CAPSULE ORAL at 17:44

## 2018-09-18 RX ADMIN — CLONIDINE HYDROCHLORIDE 0.1 MG: 0.1 TABLET ORAL at 19:34

## 2018-09-18 RX ADMIN — ASPIRIN 81 MG: 81 TABLET, COATED ORAL at 09:24

## 2018-09-18 RX ADMIN — TACROLIMUS 1.5 MG: 1 CAPSULE ORAL at 09:23

## 2018-09-18 RX ADMIN — INSULIN GLARGINE 18 UNITS: 100 INJECTION, SOLUTION SUBCUTANEOUS at 21:58

## 2018-09-18 RX ADMIN — GABAPENTIN 600 MG: 300 CAPSULE ORAL at 19:34

## 2018-09-18 RX ADMIN — GABAPENTIN 600 MG: 300 CAPSULE ORAL at 09:24

## 2018-09-18 RX ADMIN — CEFTRIAXONE 1 G: 1 INJECTION, POWDER, FOR SOLUTION INTRAMUSCULAR; INTRAVENOUS at 09:25

## 2018-09-18 RX ADMIN — CLONIDINE HYDROCHLORIDE 0.1 MG: 0.1 TABLET ORAL at 09:24

## 2018-09-18 RX ADMIN — INSULIN ASPART 1 UNITS: 100 INJECTION, SOLUTION INTRAVENOUS; SUBCUTANEOUS at 02:33

## 2018-09-18 RX ADMIN — FAMOTIDINE 20 MG: 20 TABLET, FILM COATED ORAL at 09:24

## 2018-09-18 RX ADMIN — LEVETIRACETAM 750 MG: 750 TABLET, FILM COATED ORAL at 19:34

## 2018-09-18 RX ADMIN — INSULIN ASPART 1 UNITS: 100 INJECTION, SOLUTION INTRAVENOUS; SUBCUTANEOUS at 19:38

## 2018-09-18 RX ADMIN — LEVOTHYROXINE SODIUM 50 MCG: 50 TABLET ORAL at 09:24

## 2018-09-18 RX ADMIN — Medication 150 MG: at 19:34

## 2018-09-18 RX ADMIN — INSULIN ASPART 1 UNITS: 100 INJECTION, SOLUTION INTRAVENOUS; SUBCUTANEOUS at 09:23

## 2018-09-18 RX ADMIN — PREDNISONE 5 MG: 5 TABLET ORAL at 09:25

## 2018-09-18 RX ADMIN — Medication 150 MG: at 09:23

## 2018-09-18 RX ADMIN — INSULIN ASPART 1 UNITS: 100 INJECTION, SOLUTION INTRAVENOUS; SUBCUTANEOUS at 15:49

## 2018-09-18 RX ADMIN — SODIUM CHLORIDE, POTASSIUM CHLORIDE, SODIUM LACTATE AND CALCIUM CHLORIDE: 600; 310; 30; 20 INJECTION, SOLUTION INTRAVENOUS at 19:29

## 2018-09-18 RX ADMIN — INSULIN ASPART 1 UNITS: 100 INJECTION, SOLUTION INTRAVENOUS; SUBCUTANEOUS at 06:48

## 2018-09-18 ASSESSMENT — ACTIVITIES OF DAILY LIVING (ADL)
ADLS_ACUITY_SCORE: 31
ADLS_ACUITY_SCORE: 29
ADLS_ACUITY_SCORE: 28
ADLS_ACUITY_SCORE: 28

## 2018-09-18 NOTE — PROGRESS NOTES
[facility]  Patient is currently open to home care services with [facility].  The patient is currently receiving RN/HA services.  [facility]  and team have been notified of patient admission.  [facility] liaison will continue to follow patient during stay.  If appropriate provide orders to resume home care at time of discharge.    Thank you  Renate Wilson RN, BSN  [facility] Liaison  [facility]  [phone]

## 2018-09-18 NOTE — PLAN OF CARE
Discharge Planner OT 7A  Patient plan for discharge: home with assist and HH  Current status: Pt evaluated and treatment initiated. Facilitated UE HEP to increase strength and assess cognition. Pt completed 4 exercises x4, c/o of fatigue and soreness. Assessed vision and UE coordination for IND with ADLs. Pt able to complete far/close up vision tasks with 75% accuracy. Pt with noted finger opposition deficits. Facilitated functional transfer to increase cognition and IND with activities. Pt mod-max Ax2 for rolling, dependently transferred to chair with ceiling lift. Facilitated seated h/g tasks to assess level of IND, pt completed hair, oral, facial cares SBA with set up and vc's. Further vision and cognitive assessment is warranted.    Barriers to return to prior living situation: medical status, cognition, weakness  Recommendations for discharge: Home with 24/7 assist with HH OT/RN  Rationale for recommendations: Pt has very supportive family and accessible home set up. Is dependent on [facility] for all transfers at baseline. Would benefit from continued HH OT services to increase IND with ADLs/IADLs.        Entered by: Jaylin Norris 09/18/2018 12:38 PM

## 2018-09-18 NOTE — PROGRESS NOTES
09/18/18 0829   Quick Adds   Type of Visit Initial Occupational Therapy Evaluation   Living Environment   Lives With spouse   Living Arrangements house   Number of Stairs to Enter Home 1   Number of Stairs Within Home 7   Stair Railings at Home inside, present on right side   Living Environment Comment Pt reports having a ramp within the garage to get into the house, does not go upstairs   Self-Care   Dominant Hand right   Usual Activity Tolerance poor   Current Activity Tolerance poor   Regular Exercise no   Equipment Currently Used at Home hospital bed;lift device;wheelchair, power  (ulisses lift)   Activity/Exercise/Self-Care Comment Pt reports having assistance from aide x3 times a week for sponge baths, family acts as caregivers. Pt reports having HH OT that provided a HEP, pt able to demonstrate HEP.    Functional Level Prior   Ambulation 4-->completely dependent   Transferring 4-->completely dependent   Toileting 3-->assistive equipment and person   Bathing 4-->completely dependent   Dressing 2-->assistive person   Eating 0-->independent   Communication 2-->difficulty understanding (not related to language barrier)   Swallowing 0-->swallows foods/liquids without difficulty   Cognition 2 - difficulty with organizing thoughts   Fall history within last six months no   Which of the above functional risks had a recent onset or change? cognition   Prior Functional Level Comment Uses Ulisses lift for all transfers, uses bed pan, reports she doesn't get out of bed often. Has permanent catheter.        Present no   General Information   Onset of Illness/Injury or Date of Surgery - Date 09/15/18   Referring Physician Miguelito Camarena MD   Patient/Family Goals Statement Not stated   Additional Occupational Profile Info/Pertinent History of Current Problem history of DMII, polyneuropathy, kidney transplant in 1999, and recurrent UTIs secondary to indwelling rico and is admitted for sepsis  "secondary to UTI with altered mental status   Precautions/Limitations fall precautions;oxygen therapy device and L/min  (cpap)   General Observations Activity orders: Up with assistance   Cognitive Status Examination   Orientation person;place;other (see comments)  (knows month and year and day of week, does not know date)   Level of Consciousness lethargic/somnolent   Cognitive Comment Pt somewhat lethargic at this time. Easily distracted and very tangential, requiring continuous cues to redirect.   Visual Perception   Visual Perception Comments Pt reports having glasses in the past but stated \"my doctor said I was past adjustments for glasses and my vision is going\". Pt reports using a magnifying glass for reading   Sensory Examination   Sensory Comments chronic n/t in feet and starting in hands, Pt reporting having difficulty with cutting food and manipulating objects   Pain Assessment   Patient Currently in Pain Yes, see Vital Sign flowsheet   Range of Motion (ROM)   ROM Comment WFL   Strength   Strength Comments BUE grossly 4/5   Hand Strength   Hand Strength Comments fair  bilaterally   Instrumental Activities of Daily Living (IADL)   IADL Comments Pt has assistance for all IADLs from family, mainly    Activities of Daily Living Analysis   Impairments Contributing to Impaired Activities of Daily Living cognition impaired;fear and anxiety;strength decreased;sensation decreased   General Therapy Interventions   Planned Therapy Interventions ADL retraining;IADL retraining;cognition;ROM;strengthening;stretching;transfer training;home program guidelines;progressive activity/exercise   Clinical Impression   Criteria for Skilled Therapeutic Interventions Met yes, treatment indicated   OT Diagnosis Decreased IND with ADLs/IADLs   Influenced by the following impairments weakness, pain, cognition   Assessment of Occupational Performance 5 or more Performance Deficits   Identified Performance Deficits " "functional transfers, bathing, dressing, h/g, fine motor tasks, functional cognition   Clinical Decision Making (Complexity) Low complexity   Therapy Frequency 3 times/wk   Predicted Duration of Therapy Intervention (days/wks) 1 week   Anticipated Discharge Disposition Home with Assist;Home with Home Therapy   Risks and Benefits of Treatment have been explained. Yes   Patient, Family & other staff in agreement with plan of care Yes   Clinical Impression Comments Pt would benefit from skilled OT to address the above deficits. See daily flow sheet for tx provided.    [facility] [employer] TM \"6 Clicks\"   2016, Trustees of [facility], under license to [employer].  All rights reserved.   6 Clicks Short Forms Daily Activity Inpatient Short Form   [facility] [employer]  \"6 Clicks\" Daily Activity Inpatient Short Form   1. Putting on and taking off regular lower body clothing? 1 - Total   2. Bathing (including washing, rinsing, drying)? 2 - A Lot   3. Toileting, which includes using toilet, bedpan or urinal? 1 - Total   4. Putting on and taking off regular upper body clothing? 2 - A Lot   5. Taking care of personal grooming such as brushing teeth? 3 - A Little   6. Eating meals? 3 - A Little   Daily Activity Raw Score (Score out of 24.Lower scores equate to lower levels of function) 12   Total Evaluation Time   Total Evaluation Time (Minutes) 24     "

## 2018-09-18 NOTE — PROGRESS NOTES
[facility], [place]   Transplant Nephrology Progress Note  Date of Admission:  9/15/2018    Assessment & Plan   # LDKT:       - Basline Cr ~ 1.5-1.8; Crea today: 1.64, Stable renal function.  The patient had an increase in creatinine on admission that has been resolved.                         - Proteinuria: Nephrotic range                        - Latest DSA: Not checked               Date of DSA last checked: n/a                        - BK: No                        - Kidney Tx Biopsy: Yes.                           - Sirolimus may have caused severe proteinuria during the 4/2017 biopsy but not is off.  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                         - Protein/Crea ratio: 2.96, was 15.28 in April 2017, Plan to switch her to Losartan.                            - She has marginal renal function as indicated by the imaging showing cortical thinning.  Her kidney function is progressively declining with fluctuations with the UTI episodes.       # Immunosuppression:   - Tacrolimus immediate release (goal  4-6), last Tac level was 11.1 so Tacrolimus was decreased to 1.5 mg BID, Tac level this morning 10.4. We recommend to continue on the same dose for now and repeat Tac level tomorrow am, ordered.   - Continue with Prednisone 5 mg daily.  - Changes: NO  - Check tacrolimus level tomorrow am.       # Hypertension:   - Controlled, low normal, Recommend to discontinue Clonidine and monitor BP, if elevated to start with Losartan, future plan to discontinue Labetalol as well and put her on Significant dose of Losartan to treat proteinuria as well.   - Changes: Yes   - Goal should be aggressive with the FSGS changes seen and would consider restarting acei/arb if bp's are consistently elevated above goal.  She was to restart losartan back in 4/2017 but appears to have had a rise in creatinine associated with low bp's after  discharge 2017 and losartan was stopped.         # Anemia in chronic renal disease:   - Hgb slightly decrease today: 10.9, daily monitoring.  - Iron studies: Replete     # Mineral Bone Disorder:   - Secondary renal hyperparathyroidism; PTH level is:  slightly elevated  - Vitamin D; level:  Recommend to repeat Vitamin D level  - Calcium; level is:  normal, 8.9  - Phosphorus; level is: normal                  # Electrolytes:   - Potassium, sodium level: normal       # Other Medical Issues:   # Recurrent UTI / Sepsis - Recent positive blood culture son  for S. Hominis which is likely contaminent.  However, recent urine growth of E coli which is sensitive for ceftriaxone, continue with Ceftriaxone 1 g daily.  I recommend at least a 14 days course of antibiotics for this infection and follow up with transplant ID.    # H/o EBV viremia - Recommend recheck level     # Transplant History:  Etiology of kidney failure: diabetes mellitus type 2  Transplant: 10/27/1999 (Kidney)  Donor Type: Living                Donor Class:   Crossmatch at time of Tx: unknown  DSA at time of Tx: unknown  Significant changes in immunosuppression: sirolimus stopped  for FSGS changes on biopsy  CMV IgG Ab Discordance (D+/R-): unknown  EBV IgG Ab Discordance (D+/R-): unknown  Significant Complications: recurrent UTIs      Recommendations were communicated to the primary team via this note    Seen and discussed with Dr. Av Maguire MD  Pager: [phone]  Transplant Office Phone Number: [phone]    Interval History   Patient was seen and examined at bedside this morning after she had rehab section, she was setting on the chair, she has difficulty in memorizing events, she gets easily overwhelming, even with very simple information,  with updating her about her medical issues.    She denies any chest pain, SOB, N/V, chills, abdominal pain or dizziness.     Physical Exam   Temp  Av.1  F (36.7  C)  Min: 96.8  F (36   C)  Max: 98.6  F (37  C)      Pulse  Av.8  Min: 62  Max: 76 Resp  Av.8  Min: 7  Max: 18  SpO2  Av %  Min: 86 %  Max: 100 %     /52 (BP Location: Right arm)  Pulse 76  Temp 98  F (36.7  C) (Oral)  Resp 16  Wt 101.3 kg (223 lb 4.8 oz)  SpO2 98%  BMI 36.04 kg/m2   Date 18 07 - 18 0659   Shift 0508-2291 1891-1336 1509-7849 24 Hour Total   I  N  T  A  K  E   P.O. 50   50    I.V. 100   100    Shift Total  (mL/kg) 150  (1.48)   150  (1.48)   O  U  T  P  U  T   Shift Total  (mL/kg)       Weight (kg) 101.29 101.29 101.29 101.29        Admit Weight: 101.3 kg (223 lb 4.8 oz)   GENERAL APPEARANCE: alert and no distress, obese patient with chronic deconditioning.   HENT: mouth without ulcers or lesions  LYMPHATICS: no cervical or supraclavicular nodes  RESP: lungs clear to auscultation - no rales, rhonchi or wheezes  CV: regular rhythm, normal rate, no rub, no murmur  EDEMA: 1+ LE edema bilaterally  ABDOMEN: Obese, soft, nondistended, nontender, bowel sounds normal  MS: extremities normal - no gross deformities noted, no evidence of inflammation in joints, no muscle tenderness  SKIN: no rash    Data   All labs reviewed by me.  CMP  Recent Labs  Lab 18  0627 18  1251 18  0751 09/15/18  2347    141 138 135   POTASSIUM 3.7 3.8 4.2 4.0   CHLORIDE 107 111* 107 102   CO2 22 22 21 24   ANIONGAP 8 8 10 9   * 120* 308* 411*   BUN 50* 50* 60* 62*   CR 1.64* 1.72* 1.88* 2.10*   GFRESTIMATED 31* 29* 26* 23*   GFRESTBLACK 37* 35* 32* 28*   ROGER 8.9 8.8 9.0 9.4   PHOS  --   --  2.7  --    PROTTOTAL  --  7.0  --  8.3   ALBUMIN  --  2.6* 2.8* 3.0*   BILITOTAL  --  0.2  --  0.3   ALKPHOS  --  54  --  71   AST  --  6  --  5   ALT  --  9  --  10     CBC  Recent Labs  Lab 18  0627 18  1251 18  0751 09/15/18  2302   HGB 10.9* 11.0* 11.4* 13.5   WBC 7.4 6.4 6.4 5.7   RBC 3.80 3.70* 3.89 4.51   HCT 35.1 33.6* 36.0 42.0   MCV 92 91 93 93   MCH 28.7 29.7 29.3 29.9    MCHC 31.1* 32.7 31.7 32.1   RDW 13.5 13.3 13.3 13.2    150 161 187     INRNo lab results found in last 7 days.  ABGNo lab results found in last 7 days.   Urine Studies  Recent Labs   Lab Test  09/15/18   2303  07/28/18   1450  07/05/18   1545  05/25/18   0155   01/11/18   1614  12/19/17   1620  11/13/17   1646   COLOR  Light Yellow  Straw  Light Yellow  Yellow   < >  Yellow  Pink  Yellow   APPEARANCE  Slightly Cloudy  Clear  Cloudy  Cloudy   < >  Turbid  Slightly Cloudy  Turbid   URINEGLC  >1000*  Negative  100*  70*   < >  100*  100*  500*   URINEBILI  Negative  Negative  Negative  Negative   < >  Negative  Negative  Negative   URINEKETONE  5*  Negative  Negative  Negative   < >  Negative  Negative  Negative   SG  1.010  1.004  1.020  1.013   < >  1.025  1.015  1.015   UBLD  Small*  Negative  Large*  Moderate*   < >  Large*  Large*  Moderate*   URINEPH  5.5  5.5  6.0  5.5   < >  6.0  6.5  6.5   PROTEIN  100*  30*  >600*  100*   < >  >=300*  100*  100*   UROBILINOGEN   --    --    --    --    --   0.2  0.2  0.2   NITRITE  Positive*  Negative  Positive*  Positive*   < >  Positive*  Negative  Negative   LEUKEST  Large*  Large*  Large*  Large*   < >  Large*  Large*  Small*   RBCU  9*  8*  >182*  100*   < >  5-10*  25-50*  25-50*   WBCU  286*  24*  >182*  >182*   < >  >100*  *  >100*    < > = values in this interval not displayed.     Recent Labs   Lab Test  09/17/18   2040  04/24/17   1930  10/07/11   1230  09/27/11   1600   UTPG  2.96*  15.28*  Test canceled by PCU/Clinic  Charge credited PER RADHA FALCON  0.07     PTH  Recent Labs   Lab Test  09/18/18   0627  03/11/17   0859  02/16/17   2303  10/07/11   1215  09/27/11   1600   PTHI  104*  334*  9*  Test canceled by PCU/Clinic  Charge credited PER RADHA FALCON  53     Iron Studies  Recent Labs   Lab Test  02/17/17   2358  09/04/16   0815  08/07/16   0750  08/09/12   0819  07/14/12   0720 05/08/12  10/07/11   1215  09/27/11   1600   IRON  50  124  12*   68  154  68  Test canceled by PCU/Clinic  Charge credited CANCELED PER RADHA FALCON CORRECTED ON 10/07 AT 1459: PREVIOUSLY REPORTED AS 69  26*   FEB  170*  128*  166*  188*  169*   --   Test canceled by PCU/Clinic  Charge credited CANCELED PER RADHA FALCON CORRECTED ON 10/07 AT 1459: PREVIOUSLY REPORTED   227*   IRONSAT  29  97*  7*  36  91*   --   Test canceled by PCU/Clinic  Charge credited CANCELED PER RADHA FALCON CORRECTED ON 10/07 AT 1459: PREVIOUSLY REPORTED AS 30  12*   GIANA  1069*  2676*  645*   --   444*  630?   --   72       IMAGING:  All imaging studies reviewed by me.

## 2018-09-18 NOTE — PLAN OF CARE
Problem: Patient Care Overview  Goal: Plan of Care/Patient Progress Review  Outcome: No Change  Status: UTI and altered mental status    VS: VSS on RA. Intermittently using home CPAP overnight. HTN but within parameters (SBP <180)   Neuros: Pt was A&Ox4 (not to exact date) overnight. Conversation was appropriate overnight. No auditory or visual hallucinations overnight. BUE 4/5. BLE 1-2/5. Tingling in hands and neuropathy in feet.   GI: Regular diet, poor PO  : Johnson in place with adequate UOP; see flowsheet ?  IV: PIV infusing LR at 75 ml/hr. R ankle PIV SL  Activity: Up with assist of 2 and lift. Turned and repositioned q2 hrs overnight  Pain: Denies pain  Respiratory/Trach: LS diminished in bases   Skin: L posterior thigh skin tear covered with mepilex. Coccyx wound mepilex changed at 0400  Social:  updated via phone overnight    Plan of care: IV abx and blood cultures

## 2018-09-18 NOTE — PLAN OF CARE
Problem: Patient Care Overview  Goal: Plan of Care/Patient Progress Review  7A PT: Defer; per discussion with OT, pt is dependent for functional mobility at baseline, using OH lift for transfers and is non-ambulatory. Pt has supportive family at home who assists her with all mobility. Pt with no skilled IP PT needs at this time, will complete orders.

## 2018-09-18 NOTE — PROGRESS NOTES
[facility], [place]    Internal Medicine Progress Note - Gold Service      Assessment & Plan      Gem Jade is a 72 year old female admitted on 9/15/2018. She has a history of DMII, polyneuropathy, kidney transplant in 1999, and recurrent UTIs secondary to indwelling rico and is admitted for sepsis secondary to UTI with altered mental status. Improving on antibiotics, with culture growing pan-sensitive E. Coli.      Plan Today:  - Urine growing pan-sensitive e coli, transitioned to cefpodoxime PO   - repeat Blood cultures NGTD       # Severe Sepsis secondary to E coli Urinary Tract Infection -  Toxic metabolic encephalopathy on admission likely caused by urinary tract infection, improven. Head CT negative on admission. Has a history of recurrent infections with indwelling rico, all e coli have been pan-sensitive in the past. Started on cefepime on admission.   - Transition to cefpodoxime for UTI given pan-sensitive E.coli on urine Cx   - Repeat blood cultures after CoNS growing are NGTD     # CoNS Bacteremia - negative for vancA and mecA genes. Not staph aureus. Drawn before cefepime. Unclear if contaminant or true pathogen but sensitive to ceftriaxone.  - Repeat cultures remain NGTD      # Acute Kidney Injury in prior Kidney Transplant - Baseline creatinine appears to be between 1.2-1.5 and on admission up to 2.10 likely secondary to sepsis. Trended down with fluids. US of Transplanted Kidney without hydronephrosis  - Transplant Nephrology following  - Continue prednisone 5 mg and tacrolimus 1.5 mg BID  - restarted baclofen given improvement in mental status and improvement in kidney function.  - Tacrolimus levels remain elevated after adjustment, repeat tomorrow   - continuing fluids, slow improvement in ROB      # Hypertension   # Paroxysymal Atrial Fibrillation  - Continue clonidine 0.1 mg BID  - Continue labetalol 150 mg BID  - pending renal recommendations may switch to  losartan      #DMII  Home regimen is novolog TID with meals and lantus 22 units nightly. Glucose elevated in the ED, however, patient had not received evening medications.  - lantus 18 units nightly as patient is NPO  - sliding scale insulin prn    Diet: Regular Diet Adult  Fluids: 75 ml/hr   Johnson Catheter: in place, indication: chronic     DVT Prophylaxis: Pneumatic Compression Devices  Code Status: Full Code    Disposition Plan   Expected discharge: 1-2 days, recommended to transitional care unit once renal function improved and safe disposition plan/ TCU bed available.     Entered: Mallory Johnson MD 09/18/2018, 6:12 PM   Information in the above section will display in the discharge planner report.      The patient's care was discussed with the Bedside Nurse and Patient.    Mallory Johnson MD  Internal Medicine Staff Hospitalist Service  [facility]  Pager: [phone]  Please see sticky note for cross cover information    Interval History   No acute events overnight, endorses she feels a little better than prior with clearer thinking. Primary complaint this morning is pain in her sacral wound/pressure ulcer since she has been up in the chair almost 1 hour. Otherwise no chest pain, shortness of breath, abdominal pain, nausea, vomiting.       Data reviewed today: I reviewed all medications, new labs and imaging results over the last 24 hours. I personally reviewed no images or EKG's today.    Physical Exam   Vital Signs: Temp: 97.5  F (36.4  C) Temp src: Axillary BP: 129/70   Heart Rate: 71 Resp: 16 SpO2: 99 % O2 Device: None (Room air)    Weight: 223 lbs 4.8 oz  Gen: alert, interactive but tearful, sitting in chair looking uncomfortable   HEENT: Normocephalic/atraumatic, sclera clear, oropharynx with moist mucous membranes  Resp: Clear bilaterally, easy work of breathing on room air  CV: Regular rate and rhythm, no murmurs noted   Abd: soft, non-tender, nondistended  Ext: 1+ lower extremity edema,  warm and well-perfused with brisk capillary refill   Neuro: Alert and oriented x4, grossly non-focal, moving all extremities equally        Data   Data     Recent Labs  Lab 09/18/18  0627 09/17/18  1251 09/16/18  0751 09/15/18  2347   WBC 7.4 6.4 6.4  --    HGB 10.9* 11.0* 11.4*  --    MCV 92 91 93  --     150 161  --     141 138 135   POTASSIUM 3.7 3.8 4.2 4.0   CHLORIDE 107 111* 107 102   CO2 22 22 21 24   BUN 50* 50* 60* 62*   CR 1.64* 1.72* 1.88* 2.10*   ANIONGAP 8 8 10 9   ROGER 8.9 8.8 9.0 9.4   * 120* 308* 411*   ALBUMIN  --  2.6* 2.8* 3.0*   PROTTOTAL  --  7.0  --  8.3   BILITOTAL  --  0.2  --  0.3   ALKPHOS  --  54  --  71   ALT  --  9  --  10   AST  --  6  --  5

## 2018-09-19 VITALS
HEART RATE: 60 BPM | SYSTOLIC BLOOD PRESSURE: 147 MMHG | DIASTOLIC BLOOD PRESSURE: 60 MMHG | OXYGEN SATURATION: 97 % | WEIGHT: 223.3 LBS | RESPIRATION RATE: 18 BRPM | TEMPERATURE: 97.8 F | BODY MASS INDEX: 36.04 KG/M2

## 2018-09-19 LAB
ANION GAP SERPL CALCULATED.3IONS-SCNC: 9 MMOL/L (ref 3–14)
BUN SERPL-MCNC: 44 MG/DL (ref 7–30)
CALCIUM SERPL-MCNC: 8.8 MG/DL (ref 8.5–10.1)
CHLORIDE SERPL-SCNC: 107 MMOL/L (ref 94–109)
CO2 SERPL-SCNC: 22 MMOL/L (ref 20–32)
CREAT SERPL-MCNC: 1.57 MG/DL (ref 0.52–1.04)
GFR SERPL CREATININE-BSD FRML MDRD: 32 ML/MIN/1.7M2
GLUCOSE BLDC GLUCOMTR-MCNC: 111 MG/DL (ref 70–99)
GLUCOSE BLDC GLUCOMTR-MCNC: 115 MG/DL (ref 70–99)
GLUCOSE BLDC GLUCOMTR-MCNC: 131 MG/DL (ref 70–99)
GLUCOSE BLDC GLUCOMTR-MCNC: 167 MG/DL (ref 70–99)
GLUCOSE BLDC GLUCOMTR-MCNC: 193 MG/DL (ref 70–99)
GLUCOSE BLDC GLUCOMTR-MCNC: 220 MG/DL (ref 70–99)
GLUCOSE SERPL-MCNC: 177 MG/DL (ref 70–99)
POTASSIUM SERPL-SCNC: 4 MMOL/L (ref 3.4–5.3)
SODIUM SERPL-SCNC: 138 MMOL/L (ref 133–144)

## 2018-09-19 PROCEDURE — A9270 NON-COVERED ITEM OR SERVICE: HCPCS | Mod: GY | Performed by: INTERNAL MEDICINE

## 2018-09-19 PROCEDURE — 99239 HOSP IP/OBS DSCHRG MGMT >30: CPT | Performed by: INTERNAL MEDICINE

## 2018-09-19 PROCEDURE — 25000132 ZZH RX MED GY IP 250 OP 250 PS 637: Mod: GY | Performed by: INTERNAL MEDICINE

## 2018-09-19 PROCEDURE — 36415 COLL VENOUS BLD VENIPUNCTURE: CPT | Performed by: INTERNAL MEDICINE

## 2018-09-19 PROCEDURE — 25000131 ZZH RX MED GY IP 250 OP 636 PS 637: Mod: GY | Performed by: INTERNAL MEDICINE

## 2018-09-19 PROCEDURE — 25000125 ZZHC RX 250: Performed by: STUDENT IN AN ORGANIZED HEALTH CARE EDUCATION/TRAINING PROGRAM

## 2018-09-19 PROCEDURE — 00000146 ZZHCL STATISTIC GLUCOSE BY METER IP

## 2018-09-19 PROCEDURE — 80048 BASIC METABOLIC PNL TOTAL CA: CPT | Performed by: INTERNAL MEDICINE

## 2018-09-19 PROCEDURE — 25000128 H RX IP 250 OP 636: Performed by: INTERNAL MEDICINE

## 2018-09-19 PROCEDURE — 25000132 ZZH RX MED GY IP 250 OP 250 PS 637: Mod: GY | Performed by: STUDENT IN AN ORGANIZED HEALTH CARE EDUCATION/TRAINING PROGRAM

## 2018-09-19 PROCEDURE — A9270 NON-COVERED ITEM OR SERVICE: HCPCS | Mod: GY | Performed by: STUDENT IN AN ORGANIZED HEALTH CARE EDUCATION/TRAINING PROGRAM

## 2018-09-19 RX ORDER — CEFDINIR 300 MG/1
300 CAPSULE ORAL 2 TIMES DAILY
Qty: 24 CAPSULE | Refills: 0 | Status: SHIPPED | OUTPATIENT
Start: 2018-09-19 | End: 2019-02-12

## 2018-09-19 RX ORDER — LOSARTAN POTASSIUM 25 MG/1
12.5 TABLET ORAL DAILY
Qty: 30 TABLET | Refills: 0 | Status: SHIPPED | OUTPATIENT
Start: 2018-09-20 | End: 2018-10-30

## 2018-09-19 RX ORDER — TACROLIMUS 1 MG/1
1 CAPSULE ORAL 2 TIMES DAILY
Qty: 120 CAPSULE | Refills: 3 | Status: SHIPPED | OUTPATIENT
Start: 2018-09-19 | End: 2018-10-11

## 2018-09-19 RX ORDER — TACROLIMUS 0.5 MG/1
0.5 CAPSULE ORAL 2 TIMES DAILY
Qty: 30 CAPSULE | Refills: 0 | Status: SHIPPED | OUTPATIENT
Start: 2018-09-19 | End: 2018-10-11

## 2018-09-19 RX ORDER — TACROLIMUS 0.5 MG/1
0.5 CAPSULE ORAL 2 TIMES DAILY
Qty: 30 CAPSULE | Refills: 0 | Status: SHIPPED | OUTPATIENT
Start: 2018-09-19 | End: 2018-09-19

## 2018-09-19 RX ADMIN — CEFDINIR 300 MG: 300 CAPSULE ORAL at 08:31

## 2018-09-19 RX ADMIN — LEVOTHYROXINE SODIUM 50 MCG: 50 TABLET ORAL at 08:30

## 2018-09-19 RX ADMIN — ASPIRIN 81 MG: 81 TABLET, COATED ORAL at 08:30

## 2018-09-19 RX ADMIN — TACROLIMUS 1.5 MG: 1 CAPSULE ORAL at 08:29

## 2018-09-19 RX ADMIN — ONDANSETRON 4 MG: 4 TABLET, ORALLY DISINTEGRATING ORAL at 14:47

## 2018-09-19 RX ADMIN — CLONIDINE HYDROCHLORIDE 0.1 MG: 0.1 TABLET ORAL at 08:30

## 2018-09-19 RX ADMIN — Medication 150 MG: at 08:30

## 2018-09-19 RX ADMIN — FAMOTIDINE 20 MG: 20 TABLET, FILM COATED ORAL at 08:30

## 2018-09-19 RX ADMIN — GABAPENTIN 600 MG: 300 CAPSULE ORAL at 08:29

## 2018-09-19 RX ADMIN — INSULIN ASPART 2 UNITS: 100 INJECTION, SOLUTION INTRAVENOUS; SUBCUTANEOUS at 01:19

## 2018-09-19 RX ADMIN — INSULIN ASPART 2 UNITS: 100 INJECTION, SOLUTION INTRAVENOUS; SUBCUTANEOUS at 04:10

## 2018-09-19 RX ADMIN — PREDNISONE 5 MG: 5 TABLET ORAL at 08:29

## 2018-09-19 RX ADMIN — SODIUM CHLORIDE, POTASSIUM CHLORIDE, SODIUM LACTATE AND CALCIUM CHLORIDE: 600; 310; 30; 20 INJECTION, SOLUTION INTRAVENOUS at 10:01

## 2018-09-19 RX ADMIN — LEVETIRACETAM 750 MG: 750 TABLET, FILM COATED ORAL at 08:30

## 2018-09-19 ASSESSMENT — ACTIVITIES OF DAILY LIVING (ADL)
ADLS_ACUITY_SCORE: 29
ADLS_ACUITY_SCORE: 29
ADLS_ACUITY_SCORE: 31
ADLS_ACUITY_SCORE: 29
ADLS_ACUITY_SCORE: 31

## 2018-09-19 ASSESSMENT — PAIN DESCRIPTION - DESCRIPTORS: DESCRIPTORS: ACHING

## 2018-09-19 NOTE — PLAN OF CARE
Problem: Patient Care Overview  Goal: Plan of Care/Patient Progress Review  Outcome: No Change  6544-3490: Afebrile. Pt slightly hypertensive (170s/70s). BP recheck following administration of patients scheduled BP medications, 164/71. OVSS on RA, pt utilizing home CPAP machine when sleeping. Pt complaining of some lower back and coccyx discomfort, pt turned and repositioned. Pt on regular diet, pt was able to eat 100% of her dinner, no complaints of nausea. BS checks every 4 hours. BS check around 1600 was 171, 1 unit of insulin given per order parameters. BS check around 2000 was 163, 1 unit of insulin given per order parameters. PIV L Forearm infusing with LR at 75 mL/hr. Johnson catheter with adequate amounts of urine output, 450 mL emptied this shift. No BM this shift, pt is passing flatus. Coccyx and L posterior thigh wound dressings changed this afternoon. Pt turned and repositioned with assist x2 this shift. Plan for possible discharge tomorrow. Call light in reach. Will continue to monitor and follow plan of care.

## 2018-09-19 NOTE — PROGRESS NOTES
Focus: Discharge  D/I: Pt deemed appropriate for discharge. Discharge instructions given and medications reviewed and picked up for patient from discharge pharmacy. PIV removed, rico in place for chronic neurogenic bladder. All belongings collected from room and sent with pt. Pt transported via EMT, report given to transporters and pt taken via stretcher.  A/P: F/U appt with renal to be made by patient. PCP appt in place for later this month. Home care nurse to see pt in home, orders faxed.

## 2018-09-19 NOTE — PLAN OF CARE
Problem: Patient Care Overview  Goal: Plan of Care/Patient Progress Review  Outcome: Improving  Pt afebrile, vitals stable, on room air.  Pt wearing her own CPAP tonight.  Blood sugars every 4 hours: 220 and 190, 2 units novolog given.  Alert and oriented.   Pt with no complaints of pain except for discomfort from skin breakdown on coccyx and left thigh.  These areas are reddened and covered with mepilex. Johnson with 550cc out.  Urine is odorous, light yellow.  LR infusing at 75cc/hr.  Turned every 1 to 2 hours.  Pt's last BM was 9/15.   Probable DC today.

## 2018-09-19 NOTE — DISCHARGE SUMMARY
[facility], [place]    Internal Medicine Discharge Summary- Gold Service     Date of Admission:  9/15/2018  Date of Discharge:  9/19/2018  4:22 PM  Discharging Provider: Mallory Johnson  Discharge Team: Gold 1    Discharge Diagnoses   Delirium [R41.0]  UTI (urinary tract infection) [N39.0]  Severe sepsis   CoNS bacteremia   Acute kidney injury in prior kidney transplant   Hypertension     Follow-ups Needed After Discharge   1. Complete course of antibiotics for UTI   2. Follow-up with nephrology re:titration of BP meds, up titration of losartan   3. Regular scheduled of rico changes established with PCP, recheck of vitamin D level     Hospital Course      Gem Jade is a 72 year old female admitted on 9/15/2018. She has a history of DMII, polyneuropathy, kidney transplant in 1999, and recurrent UTIs secondary to indwelling rico and is admitted for sepsis secondary to UTI with altered mental status.   The following problems were addressed during her hospitalization:    # Severe Sepsis secondary to E coli Urinary Tract Infection -  Toxic metabolic encephalopathy on admission likely caused by urinary tract infection, improving. Head CT negative for acute pathology on admission. Has a history of recurrent infections with indwelling rico, all e coli have been pan-sensitive in the past. She was started on cefepime on admission, but was transitioned to cefpodoxime given pan-sensitive E.coli on urine culture and no positive E.coli blood cultures. She should complete a total of 14 day course.       # CoNS Bacteremia - negative for vancA and mecA genes, likely contaminant and repeat cultures remained NGTD.        # Acute Kidney Injury in prior Kidney Transplant - Baseline creatinine appears to be between 1.2-1.5 and on admission up to 2.10, likely secondary to sepsis. Trended down with fluids to 1.57 at discharge. US of Transplanted Kidney was without hydronephrosis.  Transplant Nephrology  followed throughout course, and given supratherapeutic tacrolimus levels they also went down on her dose. At discharge she will continue prednisone 5 mg and tacrolimus 1.5 mg BID.       # Hypertension   # Paroxysymal Atrial Fibrillation  # Proteinuria  She was initially maintained on her home regimen of clonidine 0.1 mg BID and labetalol, but given her persistent proteinuria she was started on low dose losartan, clonidine was stopped. She will continue labetalol at discharge, but the outpatient intention is to transition her to losartan alone for the most renal-protective strategy (transplant nephrology to manage)     #DMII  Home regimen is novolog TID with meals and lantus 22 units nightly. Glucose elevated in the ED, however, patient had not received evening medications, and was otherwise maitained on her home regimen at discharge.       Consultations This Hospital Stay   VASCULAR ACCESS CARE ADULT IP CONSULT  NEPHROLOGY KIDNEY/PANCREAS TRANSPLANT ADULT IP CONSULT  PHARMACY TO DOSE VANCO  WOUND OSTOMY CONTINENCE NURSE  IP CONSULT  PHYSICAL THERAPY ADULT IP CONSULT  OCCUPATIONAL THERAPY ADULT IP CONSULT  VASCULAR ACCESS CARE ADULT IP CONSULT     Code Status   Full Code    Time Spent on this Encounter   I, Mallory Johnson, personally saw the patient today and spent greater than 30 minutes discharging this patient.       Mallory Johnson MD  Internal Medicine Staff Hospitalist Service  [facility]  Pager: [phone]  ______________________________________________________________________    Physical Exam   Vital Signs: Temp: 97.8  F (36.6  C) Temp src: Oral BP: 147/60 Pulse: 60 Heart Rate: 70 Resp: 18 SpO2: 97 % O2 Device: None (Room air)    Weight: 223 lbs 4.8 oz  Gen: alert, interactive and bright, lying in bed in NAD   HEENT: Normocephalic/atraumatic, sclera clear, oropharynx with moist mucous membranes  Resp: Clear bilaterally, easy work of breathing on room air  CV: Regular rate and rhythm, no murmurs  noted   Abd: soft, non-tender, nondistended.   : rico bag in place   Ext: 1+ lower extremity edema, warm and well-perfused with brisk capillary refill   Neuro: Alert and oriented x4, grossly non-focal, moving all extremities equally     Significant Results and Procedures   Most Recent 3 CBC's:  Recent Labs   Lab Test  09/18/18   0627  09/17/18   1251  09/16/18   0751   WBC  7.4  6.4  6.4   HGB  10.9*  11.0*  11.4*   MCV  92  91  93   PLT  161  150  161     Most Recent 3 BMP's:  Recent Labs   Lab Test  09/19/18   0622  09/18/18   0627  09/17/18   1251   NA  138  138  141   POTASSIUM  4.0  3.7  3.8   CHLORIDE  107  107  111*   CO2  22  22  22   BUN  44*  50*  50*   CR  1.57*  1.64*  1.72*   ANIONGAP  9  8  8   ROGER  8.8  8.9  8.8   GLC  177*  195*  120*     Most Recent 6 Bacteria Isolates From Any Culture (See EPIC Reports for Culture Details):  Recent Labs   Lab Test  09/17/18   0816  09/17/18   0810  09/16/18   0026  09/15/18   2347  09/15/18   2302  09/13/18   1335   CULT  No growth after 2 days  No growth after 2 days  >100,000 colonies/mL  Escherichia coli  *  Cultured on the 1st day of incubation:  Staphylococcus hominis  This isolate DOES NOT demonstrate inducible clindamycin resistance in vitro. Clindamycin   is susceptible and could be used when indicated, however, erythromycin is resistant and   should not be used.  *  Critical Value/Significant Value, preliminary result only, called to and read back by  Armida Cardenas RN at 1835 on 9/16/18 by MARNI.    (Note)  POSITIVE for Staphylococci other than S.aureus, S.epidermidis and  S.lugdunensis, by OhLifeigene multiplex nucleic acid test.  Coagulase-negative staphylococci are the most common venipuncture or  collection associated skin CONTAMINANTS grown in blood cultures.  Final identification and antimicrobial susceptibility testing will be  verified by standard methods.    Specimen tested with Verigene multiplex, gram-positive blood culture  nucleic acid  test for the following targets: Staph aureus, Staph  epidermidis, Staph lugdunensis, other Staph species, Enterococcus  faecalis, Enterococcus faecium, Streptococcus species, S. agalactiae,  S. anginosus grp., S. pneumoniae, S. pyogenes, Listeria sp., mecA  (methicillin resistance) and Viridiana/B (vancomycin resistance).    Critical Value/Significant Value called to and read back by Ruba Thompson RN at 2115 on 9/16/18 by AMRNI.    No growth after 4 days  10,000 to 50,000 colonies/mL  Escherichia coli  *  10,000 to 50,000 colonies/mL  Strain 2  Escherichia coli  *       Pending Results     Unresulted Labs Ordered in the Past 30 Days of this Admission     Date and Time Order Name Status Description    9/17/2018 2330 EBV DNA PCR Quantitative Whole Blood In process     9/17/2018 0741 Blood culture Preliminary     9/17/2018 0741 Blood culture Preliminary     9/15/2018 2158 Blood culture Preliminary              Primary Care Physician   Marivel Barcenas NP    Discharge Disposition   Discharged to home with home care   Condition at discharge: Stable    Discharge Orders     Home care nursing referral     Reason for your hospital stay   You were hospitalized for a urinary tract infection and associated confusion and fainting. You did have an associated kidney injury, but this improved with fluids - there will be some adjustments to your medications going forward, so please review you medications closely.   In brief, your tacrolimus dose has been changed, your clonidine stopped, started losartan, and started cefdinir (antibiotic).     Adult [facility] Follow-up and recommended labs and tests   Follow up with Renal Transplant team, at (location with clinic name or city) [facility], as scheduled  to evaluate medication change and for hospital follow- up.    Appointments on [place] and/or [facility] (with [facility] or [facility] provider or service). Call [phone] if you haven't heard regarding these appointments within 7 days  of discharge.     Activity   Your activity upon discharge: activity as tolerated     When to contact your care team   Call nephrology if you have any of the following: temperature greater than 100.4, symptoms suggestive of your urinary tract infections, increased shortness of breath or increased pain.     Discharge Instructions   1. Please note the medication changes made in the AVS  2. Complete cefdinir (antibiotic) course in the next 12 days   3. Follow-up with Renal team as they recommend     Diet   Follow this diet upon discharge: regular diet prior to admission       Discharge Medications   Discharge Medication List as of 9/19/2018  2:36 PM      START taking these medications    Details   cefdinir (OMNICEF) 300 MG capsule Take 1 capsule (300 mg) by mouth 2 times daily for 12 days, Disp-24 capsule, R-0, E-Prescribe      losartan (COZAAR) 25 MG tablet Take 0.5 tablets (12.5 mg) by mouth daily, Disp-30 tablet, R-0, E-Prescribe         CONTINUE these medications which have CHANGED    Details   tacrolimus (GENERIC EQUIVALENT) 0.5 MG capsule Take 1 capsule (0.5 mg) by mouth 2 times daily Take with 1mg caps for total dose of 1.5mg BID, Disp-30 capsule, R-0, E-Prescribe      tacrolimus (GENERIC EQUIVALENT) 1 MG capsule Take 1 capsule (1 mg) by mouth 2 times daily Take with 0.5mg caps for total dose of 2.5mg BID, Disp-120 capsule, R-3, E-Prescribe         CONTINUE these medications which have NOT CHANGED    Details   ACCU-CHEK COMPACT PLUS test strip USE TO TEST BLOOD SUGAR THREE TIMES A DAY OR AS DIRECTED, Disp-102 each, R-11, E-Prescribe      ACETAMINOPHEN PO Take 325-650 mg by mouth every 4 hours as needed., Historical      aspirin 81 MG tablet Take 81 mg by mouth daily, Historical      baclofen (LIORESAL) 10 MG tablet Take 1 tablet (10 mg) by mouth 2 times daily as needed for other (Bladder Spasms), Disp-20 tablet, R-0, E-Prescribe      Cranberry 400 MG TABS Take 400 mg by mouth 2 times daily, Historical       cyanocobalamin (VITAMIN B12) 1000 MCG/ML injection Inject 1 mL (1,000 mcg) into the muscle every 30 days, Disp-3 mL, R-3, E-Prescribe      famotidine (PEPCID) 20 MG tablet Take 1 tablet (20 mg) by mouth 2 times daily, Disp-60 tablet, R-1, Historical      gabapentin (NEURONTIN) 300 MG capsule Take 2 capsules (600 mg) by mouth 2 times daily May take an additional 300mg at bedtime daily with increase pain, Disp-360 capsule, R-3, E-Prescribe      hydrocortisone 1%/eucerin 1% compounded cream Apply topically 2 times dailyDisp-30 g, R-1Local Print      insulin aspart (NOVOLOG FLEXPEN) 100 UNIT/ML injection Inject Subcutaneous 3 times daily (with meals) Sliding scale, Disp-15 mL, R-11, Historical      insulin glargine (LANTUS SOLOSTAR) 100 UNIT/ML injection Inject 22 Units Subcutaneous At Bedtime , Disp-15 mL, R-11, Historical      labetalol (NORMODYNE) 300 MG tablet Take 0.5 tablets (150 mg) by mouth 2 times daily Take 1/2 tablet (150mg) =300mg. Hold for systolic BP less than 120., Disp-120 tablet, R-3, E-Prescribe      Lactobacillus Rhamnosus, GG, (CULTURELL) capsule Take 1 capsule by mouth 2 times daily, Disp-60 capsule, R-11, Local Print      levETIRAcetam (KEPPRA) 750 MG tablet Take 1 tablet (750 mg) by mouth 2 times daily, Disp-60 tablet, R-11, E-Prescribe      levothyroxine (SYNTHROID/LEVOTHROID) 50 MCG tablet TAKE ONE TABLET BY MOUTH EVERY DAY, Disp-90 tablet, R-3, E-Prescribe      lidocaine (XYLOCAINE) 2 % topical gel Apply topically as needed for moderate pain 10 ML every 3 weeksDisp-30 mL, [id number]      loratadine (CLARITIN) 10 MG tablet Take 1 tablet (10 mg) by mouth daily, Disp-90 tablet, R-3, E-Prescribe      ondansetron (ZOFRAN ODT) 4 MG ODT tab Take 1 tablet (4 mg) by mouth daily as needed for nausea 30 minutes before getting up in your wheelchair, Disp-20 tablet, R-1, E-Prescribe      !! order for DME Equipment being ordered: Tegaderm 4x4   MICHELLE 6 months  Sacral woundDisp-1 Box, R-3, Local Print     "  !! order for DME Equipment being ordered: [employer] gel-T cushion 20 inches wide & 18 inches deep    height 1.676 m (5' 6\"), weight 101.6 kg (223 lb 15.8 oz)Disp-1 each, R-0, Local Print      pravastatin (PRAVACHOL) 10 MG tablet Take 1 tablet (10 mg) by mouth daily, Disp-90 tablet, R-3, E-Prescribe      predniSONE (DELTASONE) 5 MG tablet Take 1 tablet (5 mg) by mouth daily, Disp-90 tablet, R-3, E-Prescribe      simethicone (MYLICON) 125 MG CHEW Take 1 tablet (125 mg) by mouth as needed for intestinal gas, Disp-120 tablet, Historical      VITAMIN D, CHOLECALCIFEROL, PO Take 4,000 Units by mouth 2 times daily, Historical       !! - Potential duplicate medications found. Please discuss with provider.      STOP taking these medications       cloNIDine (CATAPRES) 0.1 MG tablet Comments:   Reason for Stopping:             Allergies   Allergies   Allergen Reactions     Bactrim Ds [Sulfamethoxazole W/Trimethoprim]      Creatinine level increased     Atorvastatin Calcium      myalgias, muscle aches     Codeine Nausea and Vomiting     Darvocet [Propoxyphene N-Apap] Nausea and Vomiting     Hydromorphone      Levofloxacin Other (See Comments) and Diarrhea     hallucinations     Morphine      Nausea and vomiting     Niaspan [Niacin] GI Disturbance     Flushing even at low dose, GI upset     Percocet [Oxycodone-Acetaminophen]      Vomiting after taking med couple of times     Vicodin [Hydrocodone-Acetaminophen] Nausea and Vomiting       "

## 2018-09-19 NOTE — PROGRESS NOTES
[facility], [place]   Transplant Nephrology Progress Note  Date of Admission:  9/15/2018    Assessment & Plan   # LDKT:       - Basline Cr ~ 1.5-1.8; Crea today: 1.57, Stable renal function.  The patient had an increase in creatinine on admission that has been resolved.                         - Proteinuria: Nephrotic range                        - Latest DSA: Not checked               Date of DSA last checked: n/a                        - BK: No                        - Kidney Tx Biopsy: Yes.                            - Sirolimus may have caused severe proteinuria during the 4/2017 biopsy but not is off.  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                         - Protein/Crea ratio: 2.96,  was 15.28 in April 2017, Started with low dose of Losartan, 12.5 mg daily.                             - She has marginal renal function as indicated by the imaging showing cortical thinning.  Her kidney function is progressively declining with fluctuations with the UTI episodes.         # Immunosuppression:   - Tacrolimus immediate release (goal  4-6), last Tac level was 10.4 with Tac dose 2.5 mg BID  -  Continue with Tacrolimus 1.5 mg BID. We recommend to repeat Tac level as outpatient.   -  Continue with Prednisone 5 mg daily.  -  Changes: NO        # Hypertension:   - BP: 140s-150s/60s, uncontrolled, low normal,   - Continue with Losartan 12.5 mg daily and up titrate for BP goal <130/80, future plan to discontinue Labetalol as well and put her on Significant dose of Losartan to treat proteinuria as well.   - Goal should be aggressive with the FSGS changes seen and would consider restarting acei/arb if bp's are consistently elevated above goal.  She was to restart losartan back in 4/2017 but appears to have had a rise in creatinine associated with low bp's after discharge 5/2017 and losartan was stopped.    - Continue with Labetalol 75  mg daily for now.         # Anemia in chronic renal disease:   - Hgb slightly decrease today: 10.9, daily monitoring.  - Iron studies: Replete      # Mineral Bone Disorder:   - Secondary renal hyperparathyroidism; PTH level is:  slightly elevated  - Vitamin D; level:  Recommend to repeat Vitamin D level  - Calcium; level is:  normal, 8.8  - Phosphorus; level is: normal         # Electrolytes:   - Potassium, sodium level: normal         # Other Medical Issues:   # Recurrent UTI / Sepsis - Recent positive blood culture son  for S. Hominis which is likely contaminent.  However, recent urine growth of E coli which is sensitive for ceftriaxone, continue with Ceftriaxone 1 g daily.  I recommend at least a 14 days course of antibiotics for this infection and follow up with transplant ID.     # H/o EBV viremia - Recommend recheck level      # Transplant History:  Etiology of kidney failure: diabetes mellitus type 2  Transplant: 10/27/1999 (Kidney)  Donor Type: Living                Donor Class:   Crossmatch at time of Tx: unknown  DSA at time of Tx: unknown  Significant changes in immunosuppression: sirolimus stopped  for FSGS changes on biopsy  CMV IgG Ab Discordance (D+/R-): unknown  EBV IgG Ab Discordance (D+/R-): unknown  Significant Complications: recurrent UTIs    Recommendations were communicated to the primary team via this note    Seen and discussed with Dr. Av Maguire MD  Pager: [phone]  Transplant Office Phone Number: [phone]    Interval History       Patient was seen and examined at bedside this morning. She states she feels much better today. She denies any chest pain, SOB, N/V, fever, chills, abdominal pain or dizziness.       Physical Exam   Temp  Av.9  F (36.6  C)  Min: 96.8  F (36  C)  Max: 98.6  F (37  C)      Pulse  Av  Min: 60  Max: 76 Resp  Av.2  Min: 7  Max: 18  SpO2  Av.1 %  Min: 86 %  Max: 100 %     /60  Pulse 60  Temp 97.8  F (36.6  C)  (Oral)  Resp 18  Wt 101.3 kg (223 lb 4.8 oz)  SpO2 97%  BMI 36.04 kg/m2   Date 09/19/18 0700 - 09/20/18 0659   Shift 3543-7468 9422-8591 1006-1211 24 Hour Total   I  N  T  A  K  E   P.O. 120   120    I.V. 338.75   338.75    Shift Total  (mL/kg) 458.75  (4.53)   458.75  (4.53)   O  U  T  P  U  T   Urine 600   600    Shift Total  (mL/kg) 600  (5.92)   600  (5.92)   Weight (kg) 101.29 101.29 101.29 101.29        Admit Weight: 101.3 kg (223 lb 4.8 oz)   GENERAL APPEARANCE: alert and no distress, obese patient with chronic deconditioning.   HENT: mouth without ulcers or lesions  LYMPHATICS: no cervical or supraclavicular nodes  RESP: lungs clear to auscultation - no rales, rhonchi or wheezes  CV: regular rhythm, normal rate, no rub, no murmur  EDEMA: Trace LE edema bilaterally  ABDOMEN: Obese, soft, nondistended, nontender, bowel sounds normal  MS: extremities normal - no gross deformities noted, no evidence of inflammation in joints, no muscle tenderness  SKIN: no rash    Data   All labs reviewed by me.  CMP  Recent Labs  Lab 09/19/18  0622 09/18/18  0627 09/17/18  1251 09/16/18  0751 09/15/18  2347    138 141 138 135   POTASSIUM 4.0 3.7 3.8 4.2 4.0   CHLORIDE 107 107 111* 107 102   CO2 22 22 22 21 24   ANIONGAP 9 8 8 10 9   * 195* 120* 308* 411*   BUN 44* 50* 50* 60* 62*   CR 1.57* 1.64* 1.72* 1.88* 2.10*   GFRESTIMATED 32* 31* 29* 26* 23*   GFRESTBLACK 39* 37* 35* 32* 28*   ROGER 8.8 8.9 8.8 9.0 9.4   PHOS  --   --   --  2.7  --    PROTTOTAL  --   --  7.0  --  8.3   ALBUMIN  --   --  2.6* 2.8* 3.0*   BILITOTAL  --   --  0.2  --  0.3   ALKPHOS  --   --  54  --  71   AST  --   --  6  --  5   ALT  --   --  9  --  10     CBC  Recent Labs  Lab 09/18/18  0627 09/17/18  1251 09/16/18  0751 09/15/18  2302   HGB 10.9* 11.0* 11.4* 13.5   WBC 7.4 6.4 6.4 5.7   RBC 3.80 3.70* 3.89 4.51   HCT 35.1 33.6* 36.0 42.0   MCV 92 91 93 93   MCH 28.7 29.7 29.3 29.9   MCHC 31.1* 32.7 31.7 32.1   RDW 13.5 13.3 13.3 13.2   PLT  161 150 161 187     INRNo lab results found in last 7 days.  ABGNo lab results found in last 7 days.   Urine Studies  Recent Labs   Lab Test  09/15/18   2303  07/28/18   1450  07/05/18   1545  05/25/18   0155   01/11/18   1614  12/19/17   1620  11/13/17   1646   COLOR  Light Yellow  Straw  Light Yellow  Yellow   < >  Yellow  Pink  Yellow   APPEARANCE  Slightly Cloudy  Clear  Cloudy  Cloudy   < >  Turbid  Slightly Cloudy  Turbid   URINEGLC  >1000*  Negative  100*  70*   < >  100*  100*  500*   URINEBILI  Negative  Negative  Negative  Negative   < >  Negative  Negative  Negative   URINEKETONE  5*  Negative  Negative  Negative   < >  Negative  Negative  Negative   SG  1.010  1.004  1.020  1.013   < >  1.025  1.015  1.015   UBLD  Small*  Negative  Large*  Moderate*   < >  Large*  Large*  Moderate*   URINEPH  5.5  5.5  6.0  5.5   < >  6.0  6.5  6.5   PROTEIN  100*  30*  >600*  100*   < >  >=300*  100*  100*   UROBILINOGEN   --    --    --    --    --   0.2  0.2  0.2   NITRITE  Positive*  Negative  Positive*  Positive*   < >  Positive*  Negative  Negative   LEUKEST  Large*  Large*  Large*  Large*   < >  Large*  Large*  Small*   RBCU  9*  8*  >182*  100*   < >  5-10*  25-50*  25-50*   WBCU  286*  24*  >182*  >182*   < >  >100*  *  >100*    < > = values in this interval not displayed.     Recent Labs   Lab Test  09/17/18   2040  04/24/17   1930  10/07/11   1230  09/27/11   1600   UTPG  2.96*  15.28*  Test canceled by PCU/Clinic  Charge credited PER RADHA FALCON  0.07     PTH  Recent Labs   Lab Test  09/18/18   0627  03/11/17   0859  02/16/17   2303  10/07/11   1215  09/27/11   1600   PTHI  104*  334*  9*  Test canceled by PCU/Clinic  Charge credited PER RADHA FALCON  53     Iron Studies  Recent Labs   Lab Test  02/17/17   2358  09/04/16   0815  08/07/16   0750  08/09/12   0819  07/14/12   0720 05/08/12  10/07/11   1215  09/27/11   1600   IRON  50  124  12*  68  154  68  Test canceled by PCU/Clinic  Charge credited  CANCELED PER RADHA FALCON CORRECTED ON 10/07 AT 1459: PREVIOUSLY REPORTED AS 69  26*   FEB  170*  128*  166*  188*  169*   --   Test canceled by PCU/Clinic  Charge credited CANCELED PER RADHA FALCON CORRECTED ON 10/07 AT 1459: PREVIOUSLY REPORTED   227*   IRONSAT  29  97*  7*  36  91*   --   Test canceled by PCU/Clinic  Charge credited CANCELED PER RADHA ROBERTSENEY CORRECTED ON 10/07 AT 1459: PREVIOUSLY REPORTED AS 30  12*   GIANA  1069*  2676*  645*   --   444*  630?   --   72       IMAGING:  All imaging studies reviewed by me.

## 2018-09-19 NOTE — PROGRESS NOTES
"Care Coordinator  D: Gem Jade is a 72 year old female admitted on 9/15/2018. She has a history of DMII, polyneuropathy, kidney transplant in 1999, and recurrent UTIs secondary to indwelling rico and is admitted for sepsis secondary to UTI with altered mental status. Improving on antibiotics, with culture growing pan-sensitive E. Coli--per Dr Johnson . Per Dr Johnson, pt may discharge to home today with resumption of [facility] RN/HHA and OT and will require a stretcher ride home.  I: I met with Gem in her room, and she is very pleasant and happy to discharge today. She has a ramp with no steps, and the  Transportation staff can just wheel her right into her room at home. She has a high back wheelchair that reclines (not power) at home with a power christiana lift, hospital bed, working glucometer and bedpan. She uses [facility] RN and HHA(visits 3 days a week for a bedbath) and OT when her family or HHA need instruction on equipment. Her spouse Chang is her main caregiver as well as her son, daughter Jessica.  PCP: Marivel Barcenas @ the [facility] [place]--she has frequent call visits with her--next is scheduled on 9/25 as well as MTM--pt aware.  Address and phone numbers are correct on the facesheet and has Medicare and [employer].  10/10/18 visit with comm paramedic--pt says is a program through her PCC that the paramedics visit her q month--pt feels it is a waste of resources, as her\" HHN does the same things as they do.\"  A: discharge to home today  P: I have arranged a stret[facility] @ [phone] (Brionna). I have informed pt and her  (pt was on the phone with him). He is at home waiting for her. CTS handoff to OP CC: Cynthia Veronica.      "

## 2018-09-19 NOTE — PLAN OF CARE
Problem: Urinary Tract Infection (Adult)  Goal: Signs and Symptoms of Listed Potential Problems Will be Absent, Minimized or Managed (Urinary Tract Infection)  Signs and symptoms of listed potential problems will be absent, minimized or managed by discharge/transition of care (reference Urinary Tract Infection (Adult) CPG).   Outcome: Adequate for Discharge Date Met: 09/19/18  Patient afebrile, oriented x4, VS WDL. Blood glucose decreased throughout shift. Patient stated that she did not have an appetite. Patient complained of aching back pain.  Patient reported vertigo upon repositioning, also requested zofran for motion sickness for discharge transport.   Encouraged patient to eat food prior to discharge to help bring blood glucose up for transport and help with vertigo. She ate at 1400. Patient responded well to repositioning for back pain. Encouraged fluid intake.   Patient has pressure ulcers on coccyx and sacrum. Covered with foam dressing, both dressings dry and intact.  Assessed patient's knowledge regarding medications prior to discharge.  Will discuss patient's change in BP medication during discharge teaching, patient also discharging on oral antibiotics. Medical transport arranged for 1500.

## 2018-09-20 ENCOUNTER — PATIENT OUTREACH (OUTPATIENT)
Dept: CARE COORDINATION | Facility: CLINIC | Age: 73
End: 2018-09-20

## 2018-09-20 DIAGNOSIS — T83.511D URINARY TRACT INFECTION ASSOCIATED WITH INDWELLING URETHRAL CATHETER, SUBSEQUENT ENCOUNTER: Primary | ICD-10-CM

## 2018-09-20 DIAGNOSIS — G93.40 ENCEPHALOPATHY ACUTE: ICD-10-CM

## 2018-09-20 DIAGNOSIS — N39.0 URINARY TRACT INFECTION ASSOCIATED WITH INDWELLING URETHRAL CATHETER, SUBSEQUENT ENCOUNTER: Primary | ICD-10-CM

## 2018-09-20 LAB
EBV DNA # SPEC NAA+PROBE: ABNORMAL {COPIES}/ML
EBV DNA SPEC NAA+PROBE-LOG#: 4.5 {LOG_COPIES}/ML

## 2018-09-20 NOTE — PLAN OF CARE
Occupational Therapy Discharge Summary    Reason for therapy discharge:    Discharged to home with home therapy.    Progress towards therapy goal(s). See goals on Care Plan in [facility] electronic health record for goal details.  Goals not met.  Barriers to achieving goals:   discharge from facility.    Therapy recommendation(s):    Continued therapy is recommended.  Rationale/Recommendations:  Pt would benefit from continued HH OT to maintain and progress IND with ADLs/IADLs..

## 2018-09-21 ENCOUNTER — TELEPHONE (OUTPATIENT)
Dept: TRANSPLANT | Facility: CLINIC | Age: 73
End: 2018-09-21

## 2018-09-21 DIAGNOSIS — Z94.0 KIDNEY TRANSPLANTED: Primary | ICD-10-CM

## 2018-09-21 LAB
BACTERIA SPEC CULT: NO GROWTH
Lab: NORMAL
SPECIMEN SOURCE: NORMAL

## 2018-09-21 ASSESSMENT — ACTIVITIES OF DAILY LIVING (ADL): DEPENDENT_IADLS:: CLEANING;COOKING;LAUNDRY;SHOPPING;MEAL PREPARATION;MEDICATION MANAGEMENT;TRANSPORTATION

## 2018-09-21 NOTE — TELEPHONE ENCOUNTER
Call placed to pt to f/u on recent discontinue from hospital.  No answer.  Left message for pt to return call when available.     Pt will need repeat transplant labs in 2 weeks d/t recent tacrolimus dose change.  Pt to f/u with PCP d/t hospitalization.

## 2018-09-21 NOTE — PROGRESS NOTES
Clinic Care Coordination Contact  Care Team Conversations    Left msg on voice mail to discuss plan as above    Porsha Khalil R.N.  Clinic Care Coordinator  [facility]  [phone]

## 2018-09-21 NOTE — PROGRESS NOTES
Clinic Care Coordination Contact  Care Team Conversations    Per discussion with PCP rico catheter changes were changed to every three weeks since PCP thought the every 2 week frequency may have been contributing to patient getting UTI's. Left detailed msg for home care nurse that orders for rico catheter changes every 3 weeks and PRN were approved via verbal discussion with PCP.     Porsha Khalil R.N.  Clinic Care Coordinator  [facility]  [phone]

## 2018-09-21 NOTE — PROGRESS NOTES
Clinic Care Coordination Contact  Care Team Conversations    Spoke with home care nurse Cornelia. Asking for recert orders for home care SN visits one visit this week, 3 times per week next week. OT 1 time per week x 2 weeks. HHA 1 x per week this week then 3 times per week next week. Orders approved. Will send order request for rico catheter changes every 2 weeks and PRN to PCP.     Per hospital discharge note nephrology transplant team is to manage hypertension plan outpatient. Plan is to transition from labetolol and losartan to just losartan for kidney protection. Patient homebound. Will send staff msg to Helena Rogel, neph transplant RN CC to work out plant. BP per home care nurse 170/88 with current medication regimen.     Likes the sliding scale insulin regimen that she had in the hospital and wants to talk with Pharm Conchita OVIEDO, regarding this at her phone visit on Tuesday. PCP updated. Will send chart to Pharm D with sliding scale per hospital MAR for future discussion with patient    Correction Scale - MEDIUM INSULIN RESISTANCE DOSING      Do Not give Correction Insulin if BG less than 140.   For  - 189 give 1 unit.   For  - 239 give 2 units.   For  - 289 give 3 units.   For  - 339 give 4 units.   For  - 399 give 5 units.   For BG greater than or equal to 400 give 6 units.   Check blood glucose Q4H and administer based on blood glucose.   Notify provider if glucose greater than or equal to 350 mg/dL after administration of correction dose.   If given at mealtime, administer within 30 minutes of start of meal     Porsha Khalil R.N.  Clinic Care Coordinator  [facility]  [phone]

## 2018-09-24 ENCOUNTER — TELEPHONE (OUTPATIENT)
Dept: NEPHROLOGY | Facility: CLINIC | Age: 73
End: 2018-09-24

## 2018-09-24 ASSESSMENT — ACTIVITIES OF DAILY LIVING (ADL): DEPENDENT_IADLS:: CLEANING;COOKING;LAUNDRY;SHOPPING;MEAL PREPARATION;MEDICATION MANAGEMENT;TRANSPORTATION

## 2018-09-24 NOTE — PROGRESS NOTES
Clinic Care Coordination Contact  Care Team Conversations    Here's Dr. Ley's recommendation. Would you like me to call her?     Thanks,   Helena            Previous Messages       ----- Message -----      From: Marcus Ley MD      Sent: 9/21/2018   4:07 PM        To: Helena Rogel RN   Subject: RE: mutual patient plan                           Ok to manage via phone.  We can inc losartan to 25 mg daily     -Viral   ----- Message -----      From: Helena Rogel, SEKOU      Sent: 9/21/2018   2:54 PM        To: Marcus Ley MD   Subject: FW: mutual patient plan                           Would you be comfortable managing BP over the phone? She does not have any follow up scheduled in clinic.   ----- Message -----      From: Kassandra Khalil RN      Sent: 9/21/2018   2:44 PM        To: Helena Rogel RN   Subject: mutual patient plan                               David Malik:     Here's the hospital discharge note for this patient:     # Acute Kidney Injury in prior Kidney Transplant - Baseline creatinine appears to be between 1.2-1.5 and on admission up to 2.10, likely secondary to sepsis. Trended down with fluids to 1.57 at discharge. US of Transplanted Kidney was without hydronephrosis.  Transplant Nephrology followed throughout course, and given supratherapeutic tacrolimus levels they also went down on her dose. At discharge she will continue prednisone 5 mg and tacrolimus 1.5 mg BID.        # Hypertension   # Paroxysymal Atrial Fibrillation   # Proteinuria   She was initially maintained on her home regimen of clonidine 0.1 mg BID and labetalol, but given her persistent proteinuria she was started on low dose losartan, clonidine was stopped. She will continue labetalol at discharge, but the outpatient intention is to transition her to losartan alone for the most renal-protective strategy (transplant nephrology to manage)     bp 170/88 per home care nurse at visit yesterday. On labetolol and losartan.  She saw Dr. Ley and another doc I think while inpatient. She is pretty absolutely homebound. Wondering if you can talk with Dr. Ley to see if he can follow her by phone. Will be getting regular visits from home care who can do vitals and labs as needed. Or let me know what you think. She's pretty scared as they gave her bad news about her kidney transplant possibly starting to fail and that she would not be eligible for a new one (I don't recall the reason). So if you can work out a plan and let me know that would be great.     Thanks!     Porsha Khalil R.N.   Clinic Care Coordinator   [facility]   [phone]

## 2018-09-24 NOTE — PROGRESS NOTES
Clinic Care Coordination Contact    Responded to staff msg below asking specialty care coordinator to contact patient with plan. FYI for PCP    Porsha Khalil R.N.  Clinic Care Coordinator  [facility]  [phone]

## 2018-09-24 NOTE — TELEPHONE ENCOUNTER
Received message from care coordinator that patient's BP has been running high. Reviewed with Dr. Ley (he saw her inpatient), who advised increasing losartan to 25mg daily. Called patient. She said there has been some confusion around her labetalol dose, may not have been taking the right dose. She will resume this as ordered and call if it's not improving. Declined need to increase losartan at this time.    Update sent to primary care coordinator. Will plan to check in again in 1-2 weeks.    Helena Rogel RN

## 2018-09-25 ENCOUNTER — ALLIED HEALTH/NURSE VISIT (OUTPATIENT)
Dept: PHARMACY | Facility: CLINIC | Age: 73
End: 2018-09-25
Payer: COMMERCIAL

## 2018-09-25 ENCOUNTER — VIRTUAL VISIT (OUTPATIENT)
Dept: FAMILY MEDICINE | Facility: CLINIC | Age: 73
End: 2018-09-25
Payer: COMMERCIAL

## 2018-09-25 ENCOUNTER — TELEPHONE (OUTPATIENT)
Dept: FAMILY MEDICINE | Facility: CLINIC | Age: 73
End: 2018-09-25

## 2018-09-25 DIAGNOSIS — Z79.4 TYPE 2 DIABETES MELLITUS WITH DIABETIC POLYNEUROPATHY, WITH LONG-TERM CURRENT USE OF INSULIN (H): ICD-10-CM

## 2018-09-25 DIAGNOSIS — N39.0 RECURRENT UTI: ICD-10-CM

## 2018-09-25 DIAGNOSIS — Z09 HOSPITAL DISCHARGE FOLLOW-UP: ICD-10-CM

## 2018-09-25 DIAGNOSIS — E11.42 TYPE 2 DIABETES MELLITUS WITH DIABETIC POLYNEUROPATHY, WITH LONG-TERM CURRENT USE OF INSULIN (H): Primary | ICD-10-CM

## 2018-09-25 DIAGNOSIS — E11.42 TYPE 2 DIABETES MELLITUS WITH DIABETIC POLYNEUROPATHY, WITH LONG-TERM CURRENT USE OF INSULIN (H): ICD-10-CM

## 2018-09-25 DIAGNOSIS — Z94.0 KIDNEY REPLACED BY TRANSPLANT: ICD-10-CM

## 2018-09-25 DIAGNOSIS — Z79.4 TYPE 2 DIABETES MELLITUS WITH DIABETIC POLYNEUROPATHY, WITH LONG-TERM CURRENT USE OF INSULIN (H): Primary | ICD-10-CM

## 2018-09-25 DIAGNOSIS — I10 BENIGN ESSENTIAL HYPERTENSION: Primary | ICD-10-CM

## 2018-09-25 DIAGNOSIS — I10 BENIGN ESSENTIAL HYPERTENSION: ICD-10-CM

## 2018-09-25 PROCEDURE — 98968 PH1 ASSMT&MGMT NQHP 21-30: CPT | Performed by: NURSE PRACTITIONER

## 2018-09-25 PROCEDURE — 99607 MTMS BY PHARM ADDL 15 MIN: CPT | Performed by: PHARMACIST

## 2018-09-25 PROCEDURE — 99606 MTMS BY PHARM EST 15 MIN: CPT | Performed by: PHARMACIST

## 2018-09-25 NOTE — MR AVS SNAPSHOT
After Visit Summary   9/25/2018    Gem Jade    MRN: [id number]           Patient Information     Date Of Birth          1945        Visit Information        Provider Department      9/25/2018 3:30 PM Conchita [place], [facility]        Today's Diagnoses     Type 2 diabetes mellitus with diabetic polyneuropathy, with long-term current use of insulin (H)    -  1    Benign essential hypertension          Care Instructions    See AVS from PCP encounter for details           Follow-ups after your visit        Your next 10 appointments already scheduled     Sep 27, 2018 11:30 AM CDT   TELEMEDICINE with Conchita [place] [facility] [place]    [address] [place] [address]  [address] [place] [id number] [phone]           Note: this is not an onsite visit; there is no need to come to the facility.            Oct 10, 2018 12:00 PM CDT   Community Paramedic with [facility] 1, [facility]   [facility] [place]    [address] [place] [address]  [address] [place] [id number] [phone]            Oct 11, 2018  1:00 PM CDT   Telephone Visit with ANTHONY Antonio [facility] ([facility])    [address] [place], [place] [address]   [address] [place] [id number] [phone]           Note: this is not an onsite visit; there is no need to come to the facility.              Future tests that were ordered for you today     Open Future Orders        Priority Expected Expires Ordered    **Vitamin D Deficiency FUTURE anytime Routine 9/25/2018 9/25/2019 9/25/2018    **Basic metabolic panel FUTURE anytime Routine 9/25/2018 9/25/2019 9/25/2018            Who to contact     If you have questions or need follow up information about today's  clinic visit or your schedule please contact [facility] directly at [phone].  Normal or non-critical lab and imaging results will be communicated to you by MyChart, letter or phone within 4 business days after the clinic has received the results. If you do not hear from us within 7 days, please contact the clinic through [employer] or phone. If you have a critical or abnormal lab result, we will notify you by phone as soon as possible.  Submit refill requests through [employer] or call your pharmacy and they will forward the refill request to us. Please allow 3 business days for your refill to be completed.          Additional Information About Your Visit        [employer] Information     [employer] gives you secure access to your electronic health record. If you see a primary care provider, you can also send messages to your care team and make appointments. If you have questions, please call your primary care clinic.  If you do not have a primary care provider, please call [phone] and they will assist you.        Care EveryWhere ID     This is your Care EveryWhere ID. This could be used by other organizations to access your [place] medical records  [id number]         Blood Pressure from Last 3 Encounters:   09/19/18 147/60   09/14/18 132/76   09/14/18 132/76    Weight from Last 3 Encounters:   09/17/18 223 lb 4.8 oz (101.3 kg)   07/08/18 223 lb 15.8 oz (101.6 kg)   06/01/18 232 lb 2.3 oz (105.3 kg)              Today, you had the following     No orders found for display         Today's Medication Changes          These changes are accurate as of 9/25/18 11:59 PM.  If you have any questions, ask your nurse or doctor.               These medicines have changed or have updated prescriptions.        Dose/Directions    hydrocortisone 1%/eucerin 1% compounded cream   This may have changed:    - when to take this  - reasons to take this   Used for:  Rash and nonspecific skin eruption         Apply topically 2 times daily   Quantity:  30 g   Refills:  1                Primary Care Provider Office Phone # Fax #    ANTHONY Antonio [facility] [phone]       [address] [facility] [address] [place] [id number]        Goals        General    Medical (pt-stated)     Notes - Note created  7/11/2018  3:20 PM by Kassandra Khalil, RN    #1-Goal Statement: I will reduce the number of UTI's I experience  Measure of Success: reduction in number of UTI's  Supportive Steps to Achieve: home care nurse to request orders for more frequent catheter changes and increased pericare per week  Barriers: resistant UTI's  Strengths: assistance as above  Date to Achieve By: 8-15-18          Equal Access to Services     CARLOS ENRIQUE GREEN : Marques Goodman, waaxda luqadaha, qaybta kaalmada adeegyada, jessica aguilar . So [facility] [phone].    ATENCIÓN: Si habla español, tiene a peguero disposición servicios gratuitos de asistencia lingüística. Llame al [phone].    We comply with applicable federal civil rights laws and [place] laws. We do not discriminate on the basis of race, color, national origin, age, disability, sex, sexual orientation, or gender identity.            Thank you!     Thank you for choosing [facility]IMARY CARE [facility]  for your care. Our goal is always to provide you with excellent care. Hearing back from our patients is one way we can continue to improve our services. Please take a few minutes to complete the written survey that you may receive in the mail after your visit with us. Thank you!             Your Updated Medication List - Protect others around you: Learn how to safely use, store and throw away your medicines at www.disposemymeds.org.          This list is accurate as of 9/25/18 11:59 PM.  Always use your most recent med list.                   Brand Name Dispense Instructions for use Diagnosis    ACCU-CHEK COMPACT PLUS  test strip   Generic drug:  blood glucose monitoring     102 each    USE TO TEST BLOOD SUGAR THREE TIMES A DAY OR AS DIRECTED    Diabetic polyneuropathy associated with type 2 diabetes mellitus (H)       ACETAMINOPHEN PO      Take 325-650 mg by mouth every 4 hours as needed.        aspirin 81 MG tablet      Take 81 mg by mouth daily        baclofen 10 MG tablet    LIORESAL    20 tablet    Take 1 tablet (10 mg) by mouth 2 times daily as needed for other (Bladder Spasms)    Urinary catheter (Johnson) change required       cefdinir 300 MG capsule    OMNICEF    24 capsule    Take 1 capsule (300 mg) by mouth 2 times daily for 12 days    Urinary tract infection without hematuria, site unspecified       Cranberry 400 MG Tabs      Take 400 mg by mouth 2 times daily        cyanocobalamin 1000 MCG/ML injection    VITAMIN B12    3 mL    Inject 1 mL (1,000 mcg) into the muscle every 30 days    Iron deficiency anemia, unspecified iron deficiency anemia type       famotidine 20 MG tablet    PEPCID    60 tablet    Take 1 tablet (20 mg) by mouth 2 times daily        gabapentin 300 MG capsule    NEURONTIN    360 capsule    Take 2 capsules (600 mg) by mouth 2 times daily May take an additional 300mg at bedtime daily with increase pain    Diabetic polyneuropathy associated with type 2 diabetes mellitus (H)       hydrocortisone 1%/eucerin 1% compounded cream     30 g    Apply topically 2 times daily    Rash and nonspecific skin eruption       labetalol 300 MG tablet    NORMODYNE    120 tablet    Take 0.5 tablets (150 mg) by mouth 2 times daily Take 1/2 tablet (150mg) =300mg. Hold for systolic BP less than 120.    Benign essential hypertension, Kidney replaced by transplant       lactobacillus rhamnosus (GG) capsule     60 capsule    Take 1 capsule by mouth 2 times daily    Diarrhea       LANTUS SOLOSTAR 100 UNIT/ML injection   Generic drug:  insulin glargine     15 mL    Inject 22 Units Subcutaneous At Bedtime    Type 2 diabetes mellitus  "with diabetic polyneuropathy, with long-term current use of insulin (H)       levETIRAcetam 750 MG tablet    KEPPRA    60 tablet    Take 1 tablet (750 mg) by mouth 2 times daily        levothyroxine 50 MCG tablet    SYNTHROID/LEVOTHROID    90 tablet    TAKE ONE TABLET BY MOUTH EVERY DAY    Other specified hypothyroidism       lidocaine 2 % topical gel    XYLOCAINE    30 mL    Apply topically as needed for moderate pain 10 ML every 3 weeks    Johnson catheter in place       loratadine 10 MG tablet    CLARITIN    90 tablet    Take 1 tablet (10 mg) by mouth daily    Acute nasopharyngitis       losartan 25 MG tablet    COZAAR    30 tablet    Take 0.5 tablets (12.5 mg) by mouth daily    Kidney replaced by transplant, Persistent proteinuria       NovoLOG FLEXPEN 100 UNIT/ML injection   Generic drug:  insulin aspart     15 mL    Inject Subcutaneous 3 times daily (with meals) Sliding scale    Type 2 diabetes mellitus with diabetic polyneuropathy, with long-term current use of insulin (H)       ondansetron 4 MG ODT tab    ZOFRAN ODT    20 tablet    Take 1 tablet (4 mg) by mouth daily as needed for nausea 30 minutes before getting up in your wheelchair    Vertigo       order for DME     1 each    Equipment being ordered: [employer] gel-T cushion 20 inches wide & 18 inches deep  height 1.676 m (5' 6\"), weight 101.6 kg (223 lb 15.8 oz)    Back pain       order for DME     1 Box    Equipment being ordered: Tegaderm 4x4  MICHELLE 6 months Sacral wound    Decubitus ulcer of sacral region, unspecified ulcer stage       pravastatin 10 MG tablet    PRAVACHOL    90 tablet    Take 1 tablet (10 mg) by mouth daily    Hyperlipidemia LDL goal <100       predniSONE 5 MG tablet    DELTASONE    90 tablet    Take 1 tablet (5 mg) by mouth daily    Kidney replaced by transplant       simethicone 125 MG Chew chewable tablet    MYLICON    120 tablet    Take 1 tablet (125 mg) by mouth as needed for intestinal gas        * tacrolimus 1 MG capsule    " GENERIC EQUIVALENT    120 capsule    Take 1 capsule (1 mg) by mouth 2 times daily Take with 0.5mg caps for total dose of 2.5mg BID    Kidney replaced by transplant       * tacrolimus 0.5 MG capsule    GENERIC EQUIVALENT    30 capsule    Take 1 capsule (0.5 mg) by mouth 2 times daily Take with 1mg caps for total dose of 1.5mg BID    Kidney replaced by transplant       VITAMIN D (CHOLECALCIFEROL) PO      Take 4,000 Units by mouth 2 times daily        * Notice:  This list has 2 medication(s) that are the same as other medications prescribed for you. Read the directions carefully, and ask your doctor or other care provider to review them with you.

## 2018-09-25 NOTE — Clinical Note
Please call her HC nurse and let her know of med changes, also please let them know I need them to check her meds at each visit-Nurses Schedule phone visit in 2 weeks  CP please make a hospital follow up visit, draw bloods, orders placed, give B12 shot

## 2018-09-25 NOTE — PROGRESS NOTES
"Gem Jade is a 72 year old female who is being evaluated via a billable telephone visit.      The patient has been notified of following:     \"This telephone visit will be conducted via a call between you and your physician/provider. We have found that certain health care needs can be provided without the need for a physical exam.  This service lets us provide the care you need with a short phone conversation.  If a prescription is necessary we can send it directly to your pharmacy.  If lab work is needed we can place an order for that and you can then stop by our lab to have the test done at a later time.    We will bill your insurance company for this service.  Please check with your medical insurance if this type of visit is covered. You may be responsible for the cost of this type of visit if insurance coverage is denied.  The typical cost is $30 (10min), $59 (11-20min) and $85 (21-30min).  Most often these visits are shorter than 10 minutes.    If during the course of the call the physician/provider feels a telephone visit is not appropriate, you will not be charged for this service.\"       Consent has been obtained for this service by care team member: yes. See the scanned image in the medical record.  SUBJECTIVE:     Gem Jade complains of    Chief Complaint   Patient presents with     RECHECK       I have reviewed and updated the patient's Past Medical History, Social History, Family History and Medication List.    ALLERGIES  Bactrim ds [sulfamethoxazole w/trimethoprim]; Atorvastatin calcium; Codeine; Darvocet [propoxyphene n-apap]; Hydromorphone; Levofloxacin; Morphine; Niaspan [niacin]; Percocet [oxycodone-acetaminophen]; and Vicodin [hydrocodone-acetaminophen]    MANUELA Calle  (MA signature)    Additional provider notes:       Hospital Follow-up Visit:    Hospital/Nursing Home/IP Rehab Facility: [facility]  Date of Admission: 9/15/18  Date of Discharge: " 9/19/18  Reason(s) for Admission: mental confusion, UTI             Problems taking medications regularly:  Increasing her IR novolog due to high sugars, recent change to BP meds, not taking evening dose       Medication changes since discharge: changed sliding scale today, restarted Labetalol as instructed, increased Lantus back to 22 international units yesterday,          Problems adhering to non-medication therapy:  PT and OT involved     Summary of hospitalization:  [facility] discharge summary reviewed  Diagnostic Tests/Treatments reviewed.  Follow up needed: BMP and Vit D level   Other Healthcare Providers Involved in Patient s Care:         Homecare  Update since discharge: improved. Still struggling with high sugars, not administrating insulin as directed, not giving labetalol,       Post Discharge Medication Reconciliation: discharge medications reconciled and changed, per note/orders (see AVS).  Plan of care communicated with patient, family and caregiver     Coding guidelines for this visit:  Type of Medical   Decision Making Face-to-Face Visit       within 7 Days of discharge Face-to-Face Visit        within 14 days of discharge   Moderate Complexity [id number]   High Complexity [id number]           Follow-ups Needed After Discharge      1. Complete course of antibiotics for UTI   2. Follow-up with nephrology re:titration of BP meds, up titration of losartan   3. Regular scheduled of rico changes established with PCP, recheck of vitamin D level         Hospital Course         Gem Jade is a 72 year old female admitted on 9/15/2018. She has a history of DMII, polyneuropathy, kidney transplant in 1999, and recurrent UTIs secondary to indwelling rico and is admitted for sepsis secondary to UTI with altered mental status.   The following problems were addressed during her hospitalization:     # Severe Sepsis secondary to E coli Urinary Tract Infection -  Toxic metabolic encephalopathy on  admission likely caused by urinary tract infection, improving. Head CT negative for acute pathology on admission. Has a history of recurrent infections with indwelling rico, all e coli have been pan-sensitive in the past. She was started on cefepime on admission, but was transitioned to cefpodoxime given pan-sensitive E.coli on urine culture and no positive E.coli blood cultures. She should complete a total of 14 day course.       # CoNS Bacteremia - negative for vancA and mecA genes, likely contaminant and repeat cultures remained NGTD.        # Acute Kidney Injury in prior Kidney Transplant - Baseline creatinine appears to be between 1.2-1.5 and on admission up to 2.10, likely secondary to sepsis. Trended down with fluids to 1.57 at discharge. US of Transplanted Kidney was without hydronephrosis.  Transplant Nephrology followed throughout course, and given supratherapeutic tacrolimus levels they also went down on her dose. At discharge she will continue prednisone 5 mg and tacrolimus 1.5 mg BID.       # Hypertension   # Paroxysymal Atrial Fibrillation  # Proteinuria  She was initially maintained on her home regimen of clonidine 0.1 mg BID and labetalol, but given her persistent proteinuria she was started on low dose losartan, clonidine was stopped. She will continue labetalol at discharge, but the outpatient intention is to transition her to losartan alone for the most renal-protective strategy (transplant nephrology to manage)      #DMII  Home regimen is novolog TID with meals and lantus 22 units nightly. Glucose elevated in the ED, however, patient had not received evening medications, and was otherwise maitained on her home regimen at discharge.           Diabetes Follow-up        Patient is checking blood sugars: three times daily, sometimes only 2x daily     Blood sugar testing frequency justification: Uncontrolled diabetes, rarely eating about 1x daily, sometimes not at all, fluid intake down,      Results  are as follows:       fasting- 474,500, high x2, 313, 560, gave 20IU Novolog and then RBG came down to 476,  When really high readings increases her Novolog       nonfasting - none, not eating much     Diabetic concerns: blood sugar frequently over 200     Symptoms of hypoglycemia (low blood sugar): none     Paresthesias (numbness or burning in feet) or sores: No     Correction Scale - MEDIUM INSULIN RESISTANCE DOSING      Do Not give Correction Insulin if BG less than 140.   For  - 189 give 1 unit.   For  - 239 give 2 units.   For  - 289 give 3 units.   For  - 339 give 4 units.   For  - 399 give 5 units.   For BG greater than or equal to 400 give 6 units.   Check blood glucose Q4H and administer based on blood glucose.   Notify provider if glucose greater than or equal to 350 mg/dL after administration of correction dose.   If given at mealtime, administer within 30 minutes of start of meal        Hypertension Follow-up        Outpatient blood pressures are being checked at home by [facility], being followed by her kidney transplant team     Low Salt Diet: no added salt  Received message from care coordinator that patient's BP has been running high. Reviewed with Dr. Ley (he saw her inpatient), who advised increasing losartan to 25mg daily. Called patient. She said there has been some confusion around her labetalol dose, may not have been taking the right dose. She will resume this as ordered and call if it's not improving. Declined need to increase losartan at this time.     Update sent to primary care coordinator. Will plan to check in again in 1-2 weeks.     Helena Rogel RN    Per discussion with PCP rico catheter changes were changed to every three weeks since PCP thought the every 2 week frequency may have been contributing to patient getting UTI's. Left detailed msg for home care nurse that orders for rico catheter changes every 3 weeks and PRN were approved via verbal discussion  with PCP.      Porsha Khalil R.N.  Clinic Care Coordinator  [facility]  [phone]        OBJECTIVE:     (I10) Benign essential hypertension  (primary encounter diagnosis)  Comment: stable, however not checking regularly and not taking labetalol regularly  Plan: Restart labetalol, follow systolic parameters, maintain losartan at 12.5mg daily     (N39.0) Recurrent UTI  Comment: still on meds, feeling better, mentation at baseline   Plan: continue to monitor,     (Z09) Hospital discharge follow-up  Comment: stable, some confusion about meds   Plan: **Vitamin D Deficiency FUTURE anytime, **Basic         metabolic panel FUTURE anytime        Labs ordered as requested at discharge, reviewed meds, changed insulin sliding scale back to prior scale and restart Lantus     (E11.42,  Z79.4) Type 2 diabetes mellitus with diabetic polyneuropathy, with long-term current use of insulin (H)  Comment: uncontrolled, sugars high  Plan: increased sliding scale and restart Lantus at 22units, discussed diet    (Z94.0) Kidney replaced by transplant  Comment: followed by transplant team, did not get a chance to discuss that kidney maybe starting to fail and she is not a candidate for a new one   Plan: monitor     Patient Instructions   We will have the CP come out do a hospital follow up and draw bloods and give B12 injection     We will increase your sliding scale back to previous prior hospital scale, and give Lantus 22 international units     Please eat 2 meals daily, ensure, boost if no food     Please restart labetalol at doses prior to hospitalization, check your BP 2x daily        I have reviewed the note as documented above.  This accurately captures the substance of my conversation with the patient,  Klaudia MTZ      Total time of call between patient and provider was 30 minutes     Thalia Cazares

## 2018-09-25 NOTE — MR AVS SNAPSHOT
After Visit Summary   9/25/2018    Gem Jade    MRN: [id number]           Patient Information     Date Of Birth          1945        Visit Information        Provider Department      9/25/2018 2:00 PM Marivel Barcenas APRN CNP [facility]        Today's Diagnoses     Benign essential hypertension    -  1    Recurrent UTI        Hospital discharge follow-up        Type 2 diabetes mellitus with diabetic polyneuropathy, with long-term current use of insulin (H)        Kidney replaced by transplant          Care Instructions    We will have the CP come out do a hospital follow up and draw bloods and give B12 injection     We will increase your sliding scale back to previous prior hospital scale, and give Lantus 22 international units     Please eat 2 meals daily, ensure, boost if no food     Please restart labetalol at doses prior to hospitalization, check your BP 2x daily            Follow-ups after your visit        Follow-up notes from your care team     Return in about 2 weeks (around 10/9/2018) for co-visit with PCP and C, PharmD  co visit.      Your next 10 appointments already scheduled     Sep 27, 2018 11:30 AM CDT   TELEMEDICINE with Conchita Toledo [facility] ([facility])    [address] [place] [id number] [phone]           Note: this is not an onsite visit; there is no need to come to the facility.            Oct 10, 2018 12:00 PM CDT   Community Paramedic with Rodney [facility] Paramedic 1, Rodney [facility] Paramedic 2   [facility] ([facility])    [address] [place] [id number] [phone]            Oct 11, 2018  1:00 PM CDT   Telephone Visit with ANTHONY Antonio CNP   [facility] ([facility]  [facility])    [address] [facility] [address]  [address]  [facility] [id number] [phone]           Note: this is not an onsite visit; there is no need to come to the facility.              Future tests that were ordered for you today     Open Future Orders        Priority Expected Expires Ordered    **Vitamin D Deficiency FUTURE anytime Routine 9/25/2018 9/25/2019 9/25/2018    **Basic metabolic panel FUTURE anytime Routine 9/25/2018 9/25/2019 9/25/2018            Who to contact     If you have questions or need follow up information about today's clinic visit or your schedule please contact [facility] directly at [phone].  Normal or non-critical lab and imaging results will be communicated to you by [employer], letter or phone within 4 business days after the clinic has received the results. If you do not hear from us within 7 days, please contact the clinic through [employer] or phone. If you have a critical or abnormal lab result, we will notify you by phone as soon as possible.  Submit refill requests through [employer] or call your pharmacy and they will forward the refill request to us. Please allow 3 business days for your refill to be completed.          Additional Information About Your Visit        [employer] Information     [employer] gives you secure access to your electronic health record. If you see a primary care provider, you can also send messages to your care team and make appointments. If you have questions, please call your primary care clinic.  If you do not have a primary care provider, please call [phone] and they will assist you.        Care EveryWhere ID     This is your Care EveryWhere ID. This could be used by other organizations to access your [place] medical records  [id number]         Blood Pressure from Last 3 Encounters:   09/19/18 147/60   09/14/18 132/76   09/14/18 132/76    Weight from Last 3 Encounters:   09/17/18 223 lb 4.8 oz (101.3 kg)   07/08/18 223 lb  15.8 oz (101.6 kg)   06/01/18 232 lb 2.3 oz (105.3 kg)                 Today's Medication Changes          These changes are accurate as of 9/25/18 11:59 PM.  If you have any questions, ask your nurse or doctor.               These medicines have changed or have updated prescriptions.        Dose/Directions    hydrocortisone 1%/eucerin 1% compounded cream   This may have changed:    - when to take this  - reasons to take this   Used for:  Rash and nonspecific skin eruption        Apply topically 2 times daily   Quantity:  30 g   Refills:  1                Primary Care Provider Office Phone # Fax #    Marivel Barcenas, ANTHONY [facility] [phone]       [address] [place] [address]  [facility] [address]        Goals        General    Medical (pt-stated)     Notes - Note created  7/11/2018  3:20 PM by Kassandra Khalil, RN    #1-Goal Statement: I will reduce the number of UTI's I experience  Measure of Success: reduction in number of UTI's  Supportive Steps to Achieve: home care nurse to request orders for more frequent catheter changes and increased pericare per week  Barriers: resistant UTI's  Strengths: assistance as above  Date to Achieve By: 8-15-18          Equal Access to Services     KYLAH GREEN AH: Hadjb Goodman, wabettyda lujose, qaybta kaalmada aderadhada, jessica roman. So [facility] [phone].    ATENCIÓN: Si habla español, tiene a peguero disposición servicios gratuitos de asistencia lingüística. Kishorame al [phone].    We comply with applicable federal civil rights laws and [place] laws. We do not discriminate on the basis of race, color, national origin, age, disability, sex, sexual orientation, or gender identity.            Thank you!     Thank you for choosing [facility]  for your care. Our goal is always to provide you with excellent care. Hearing back from our patients is one way we can continue to improve our services.  Please take a few minutes to complete the written survey that you may receive in the mail after your visit with us. Thank you!             Your Updated Medication List - Protect others around you: Learn how to safely use, store and throw away your medicines at www.disposemymeds.org.          This list is accurate as of 9/25/18 11:59 PM.  Always use your most recent med list.                   Brand Name Dispense Instructions for use Diagnosis    ACCU-CHEK COMPACT PLUS test strip   Generic drug:  blood glucose monitoring     102 each    USE TO TEST BLOOD SUGAR THREE TIMES A DAY OR AS DIRECTED    Diabetic polyneuropathy associated with type 2 diabetes mellitus (H)       ACETAMINOPHEN PO      Take 325-650 mg by mouth every 4 hours as needed.        aspirin 81 MG tablet      Take 81 mg by mouth daily        baclofen 10 MG tablet    LIORESAL    20 tablet    Take 1 tablet (10 mg) by mouth 2 times daily as needed for other (Bladder Spasms)    Urinary catheter (Johnson) change required       cefdinir 300 MG capsule    OMNICEF    24 capsule    Take 1 capsule (300 mg) by mouth 2 times daily for 12 days    Urinary tract infection without hematuria, site unspecified       Cranberry 400 MG Tabs      Take 400 mg by mouth 2 times daily        cyanocobalamin 1000 MCG/ML injection    VITAMIN B12    3 mL    Inject 1 mL (1,000 mcg) into the muscle every 30 days    Iron deficiency anemia, unspecified iron deficiency anemia type       famotidine 20 MG tablet    PEPCID    60 tablet    Take 1 tablet (20 mg) by mouth 2 times daily        gabapentin 300 MG capsule    NEURONTIN    360 capsule    Take 2 capsules (600 mg) by mouth 2 times daily May take an additional 300mg at bedtime daily with increase pain    Diabetic polyneuropathy associated with type 2 diabetes mellitus (H)       hydrocortisone 1%/eucerin 1% compounded cream     30 g    Apply topically 2 times daily    Rash and nonspecific skin eruption       labetalol 300 MG tablet     "NORMODYNE    120 tablet    Take 0.5 tablets (150 mg) by mouth 2 times daily Take 1/2 tablet (150mg) =300mg. Hold for systolic BP less than 120.    Benign essential hypertension, Kidney replaced by transplant       lactobacillus rhamnosus (GG) capsule     60 capsule    Take 1 capsule by mouth 2 times daily    Diarrhea       LANTUS SOLOSTAR 100 UNIT/ML injection   Generic drug:  insulin glargine     15 mL    Inject 22 Units Subcutaneous At Bedtime    Type 2 diabetes mellitus with diabetic polyneuropathy, with long-term current use of insulin (H)       levETIRAcetam 750 MG tablet    KEPPRA    60 tablet    Take 1 tablet (750 mg) by mouth 2 times daily        levothyroxine 50 MCG tablet    SYNTHROID/LEVOTHROID    90 tablet    TAKE ONE TABLET BY MOUTH EVERY DAY    Other specified hypothyroidism       lidocaine 2 % topical gel    XYLOCAINE    30 mL    Apply topically as needed for moderate pain 10 ML every 3 weeks    Johnson catheter in place       loratadine 10 MG tablet    CLARITIN    90 tablet    Take 1 tablet (10 mg) by mouth daily    Acute nasopharyngitis       losartan 25 MG tablet    COZAAR    30 tablet    Take 0.5 tablets (12.5 mg) by mouth daily    Kidney replaced by transplant, Persistent proteinuria       NovoLOG FLEXPEN 100 UNIT/ML injection   Generic drug:  insulin aspart     15 mL    Inject Subcutaneous 3 times daily (with meals) Sliding scale    Type 2 diabetes mellitus with diabetic polyneuropathy, with long-term current use of insulin (H)       ondansetron 4 MG ODT tab    ZOFRAN ODT    20 tablet    Take 1 tablet (4 mg) by mouth daily as needed for nausea 30 minutes before getting up in your wheelchair    Vertigo       order for DME     1 each    Equipment being ordered: [employer] gel-T cushion 20 inches wide & 18 inches deep  height 1.676 m (5' 6\"), weight 101.6 kg (223 lb 15.8 oz)    Back pain       order for DME     1 Box    Equipment being ordered: Tegaderm 4x4  MICHELLE 6 months Sacral wound    Decubitus " ulcer of sacral region, unspecified ulcer stage       pravastatin 10 MG tablet    PRAVACHOL    90 tablet    Take 1 tablet (10 mg) by mouth daily    Hyperlipidemia LDL goal <100       predniSONE 5 MG tablet    DELTASONE    90 tablet    Take 1 tablet (5 mg) by mouth daily    Kidney replaced by transplant       simethicone 125 MG Chew chewable tablet    MYLICON    120 tablet    Take 1 tablet (125 mg) by mouth as needed for intestinal gas        * tacrolimus 1 MG capsule    GENERIC EQUIVALENT    120 capsule    Take 1 capsule (1 mg) by mouth 2 times daily Take with 0.5mg caps for total dose of 2.5mg BID    Kidney replaced by transplant       * tacrolimus 0.5 MG capsule    GENERIC EQUIVALENT    30 capsule    Take 1 capsule (0.5 mg) by mouth 2 times daily Take with 1mg caps for total dose of 1.5mg BID    Kidney replaced by transplant       VITAMIN D (CHOLECALCIFEROL) PO      Take 4,000 Units by mouth 2 times daily        * Notice:  This list has 2 medication(s) that are the same as other medications prescribed for you. Read the directions carefully, and ask your doctor or other care provider to review them with you.

## 2018-09-25 NOTE — PROGRESS NOTES
"SUBJECTIVE/OBJECTIVE:                Gem Jade is a 72 year old female called for a transitions of care visit.  She was discharged from [facility] on  for Severe sepsis - UTI. Called as covisit with PCP.  Keshawn also on the call.     Chief Complaint: Follow up after hospital discharge. \"sugars have been running high\"  Tobacco: History of tobacco dependence- quit  Alcohol: not currently using    Medication Adherence/Access:  Patient has assistance with medication administration and setting up boxes (HCRN). They come three times weekly.   also helps to set up meds    Diabetes:  Pt currently taking lantus 16u subQ daily (took 22 units last night) and novolog 10 units + 2-8 units sliding scale with dinner. Keshawn has administered 20u to bring down very elevated BG. Pt is not experiencing side effects. States missing novolog doses with meals quite frequently.   Sliding scale:   140-239=2 units  240-349=4 units  350-439=6 units  >440=8 units  SMB-3.   Ranges (read off from glucometer):   Date FBG/ 2hours post Lunch/2hours post Dinner/2hours post Bedtime     500 (12U)/474         (20U N) 450,473 (12 N)  Pasta for dinner High, 22U lantus      363     /580 /393 414      250      207             Patient is not experiencing hypoglycemia, but previously reported not feeling good with BS 81  Recent symptoms of high blood sugar? none  Eye exam: due  Foot exam: due  ACEi/ARB: Losartan  Urine Albumin:   Lab Results   Component Value Date    UMALCR 6776.56 (H) 2017      Aspirin: Taking 81mg daily and denies side effects  Diet/Exercise: Lately has been skipping meals frequently, 1-2 meals/day.  Previously reported new diet, limiting desserts, smaller portions, increased vegetables, smaller amt of fruit. Cottage cheese, peaches, some pasta, and not drinking enough water.   Has not been able to get up in her wheelchair because waiting on a seat cushion, frustrated this still hasn't been " addressed by OT.     Lab Results   Component Value Date    A1C 8.2 09/16/2018    A1C 8.8 05/26/2018    A1C 6.3 01/12/2018    A1C 6.7 12/19/2017    A1C 7.7 11/14/2017     Hypertension: Currently taking losartan 25 mg - 1/2 tab daily, stopped previous clonidine (per discharge instructions) and labetalol 150 mg BID (error-misunderstood instructions). No side effects reported. Blood pressures are being checked by HCRN - states they've been running high. Would like a recommendation for a better home wrist BP monitor as most arm cuffs are not able to fit properly. Patient followed up by her kidney transplant team.    BP Readings from Last 3 Encounters:   09/19/18 147/60   09/14/18 132/76   09/14/18 132/76     Today's Vitals: There were no vitals taken for this visit. - phone    ASSESSMENT:                 Current medications were reviewed today.      Medication Adherence: fair, needs improvement - see below  Home care RN to help  with med set up    Diabetes: Needs improvement. Patient is not meeting A1c goal of < 8%. Patient is not meeting goal fasting BG  mg/dL and post prandial <180 mg/dL. Patient would benefit from an increase in lantus given high home fasting BG readings. Patient would also benefit from consistent meals and novolog given with each meal. Recommend checking BG at least 2-3 times daily for a better understanding of her current BG trends at home.     Hypertension: Needs improvement. Patient not meeting BP goal of <140/90 mmHg. Patient would benefit from restarting labetolol in addition to current losartan dose. Recommend Omron 7 Series Wrist Blood Pressure Monitor available at [facility] for $58.    PLAN:                1. Continue Lantus 22 units daily and continue to take Novolog consistently before breakfast and before dinner  2. Check blood sugars 2-3 times daily.  3. Restart Labetalol 150 mg BID - Hold for systolic BP < 120 mmHg, continue losartan 12.5 mg daily  4. Recommend Omron 7 Series  Wrist Blood Pressure Monitor-called Keshawn with name    I spent 40 minutes with this patient today. All changes were made via collaborative practice agreement with Marivel Barcenas, NP, APRN CNP. A copy of the visit note was provided to the patient's primary care provider.    Will follow up on Thursday via phone 11:30 am.    The patient declined a summary of these recommendations as an after visit summary.    Duc Porter, Pharmacy Student    Conchita Toledo, PharmD, BCACP

## 2018-09-25 NOTE — TELEPHONE ENCOUNTER
London from [facility] called wanting to inform the provider that when she arrived at the pt's home, the first BG reading was 500. Pt's  gave her 20 units of Novalog and then the 2nd bg reading was 476. She is alert and oriented.     London wanted the provider to be aware of this due to the scheduled phone visit this afternoon @ 2:00.    London tel: [phone] (okay to leave a detailed message)    Delma Flores

## 2018-09-25 NOTE — PATIENT INSTRUCTIONS
We will have the CP come out do a hospital follow up and draw bloods and give B12 injection     We will increase your sliding scale back to previous prior hospital scale, and give Lantus 22 international units     Please eat 2 meals daily, ensure, boost if no food     Please restart labetalol at doses prior to hospitalization, check your BP 2x daily

## 2018-09-26 ENCOUNTER — TELEPHONE (OUTPATIENT)
Dept: FAMILY MEDICINE | Facility: CLINIC | Age: 73
End: 2018-09-26

## 2018-09-26 NOTE — TELEPHONE ENCOUNTER
Spoke with Johana, wants transplant labs redrawn in about a week once patient has completed antibiotics. Will inform HC RN. Johana is putting lab orders in as well.    Sallie Perales RN  09/26/18  10:59 AM

## 2018-09-26 NOTE — TELEPHONE ENCOUNTER
Call placed to pt regarding her recent hospital discharge.  Pt verbalized she is feeling better.  Pt educated we would like her to repeat transplant labs next week to ensure drug level at goal d/t recent dose change.  Pt verbalized she f/u with her PCP-Marivel Barcenas yesterday.  Pt requested we reach out to her PCP office regarding transplant lab frequency so they can time their labs with transplant labs.    RNCC spoke with Emy SAPP at Marivel's office.   She is aware we would like pt to repeat transplant labs next week.  She verbalized she will reach out to pt HCRN to let them know when we would like labs.     Pt questions answered, pt v/u.

## 2018-09-26 NOTE — TELEPHONE ENCOUNTER
Call from Johana SAPP (transplant care coordinator) with the [facility], she's requesting a call back to review pt labs.    Johana contact info: [phone]      Rich Henderson

## 2018-09-27 ENCOUNTER — TELEPHONE (OUTPATIENT)
Dept: PHARMACY | Facility: CLINIC | Age: 73
End: 2018-09-27

## 2018-09-27 NOTE — TELEPHONE ENCOUNTER
Called to f/u on hyperglycemia. Spoke with pt's /caretaker Keshawn.    Currently taking Lantus 22u daily. No hypoglycemia    BS:  Date FBG/ 2hours post Lunch/2hours post Dinner /2hours post    9/27 203      9/26 226  245       /353                                   Reviewed medications and helped with med rec. States he did not set up med box with labetolol because no way to check BP readings currently, still looking into purchasing a new BP cuff or verifying reliability one of their current cuffs. No visits with home care scheduled yet for next week but awaiting call back from HCRN.      Plan:  No change in insulin  Start labetalol as directed    F/u 2 weeks    Conchita Toledo, VivianD, BCACP

## 2018-09-27 NOTE — PROGRESS NOTES
Spoke with HC RN, she is aware of med changes & labs. She will be handing off to a new RN & will call with details. Insulin was discussed today between pharmacist & spouse per note.   Marivel Klein RN

## 2018-10-01 ENCOUNTER — TELEPHONE (OUTPATIENT)
Dept: FAMILY MEDICINE | Facility: CLINIC | Age: 73
End: 2018-10-01

## 2018-10-01 DIAGNOSIS — E11.9 TYPE 2 DIABETES MELLITUS (H): Primary | ICD-10-CM

## 2018-10-01 NOTE — TELEPHONE ENCOUNTER
Call from  Yashira with  home care - they are schedule to see pt tomorrow for cath changes and lab draw. She is confused as to why the Community Paramedic is also schedule to see pt tomorrow for lab draw, this is double booking for the same services.    Yashira contact info: [phone]      Rich Henderson

## 2018-10-01 NOTE — TELEPHONE ENCOUNTER
Please send rx for comfort ez pen needles 31g x6mm to be used with insulin pens    Thank you very kindly!  Conchita Mahoney CPhT  [facility]

## 2018-10-02 LAB
ANION GAP SERPL CALCULATED.3IONS-SCNC: 5 MMOL/L (ref 3–14)
BUN SERPL-MCNC: 43 MG/DL (ref 7–30)
CALCIUM SERPL-MCNC: 8.8 MG/DL (ref 8.5–10.1)
CHLORIDE SERPL-SCNC: 105 MMOL/L (ref 94–109)
CO2 SERPL-SCNC: 29 MMOL/L (ref 20–32)
CREAT SERPL-MCNC: 1.97 MG/DL (ref 0.52–1.04)
ERYTHROCYTE [DISTWIDTH] IN BLOOD BY AUTOMATED COUNT: 14 % (ref 10–15)
GFR SERPL CREATININE-BSD FRML MDRD: 25 ML/MIN/1.7M2
GLUCOSE SERPL-MCNC: 252 MG/DL (ref 70–99)
HCT VFR BLD AUTO: 34.2 % (ref 35–47)
HGB BLD-MCNC: 10.6 G/DL (ref 11.7–15.7)
MCH RBC QN AUTO: 29.1 PG (ref 26.5–33)
MCHC RBC AUTO-ENTMCNC: 31 G/DL (ref 31.5–36.5)
MCV RBC AUTO: 94 FL (ref 78–100)
PLATELET # BLD AUTO: 115 10E9/L (ref 150–450)
POTASSIUM SERPL-SCNC: 5.4 MMOL/L (ref 3.4–5.3)
RBC # BLD AUTO: 3.64 10E12/L (ref 3.8–5.2)
SODIUM SERPL-SCNC: 139 MMOL/L (ref 133–144)
TACROLIMUS BLD-MCNC: 3.2 UG/L (ref 5–15)
TME LAST DOSE: ABNORMAL H
WBC # BLD AUTO: 5.2 10E9/L (ref 4–11)

## 2018-10-02 PROCEDURE — 82180 ASSAY OF ASCORBIC ACID: CPT

## 2018-10-02 PROCEDURE — 80048 BASIC METABOLIC PNL TOTAL CA: CPT

## 2018-10-02 PROCEDURE — 85027 COMPLETE CBC AUTOMATED: CPT

## 2018-10-02 PROCEDURE — 80197 ASSAY OF TACROLIMUS: CPT

## 2018-10-02 NOTE — TELEPHONE ENCOUNTER
Writer spoke with Yashira-- CP have rescheduled to 10/10. Yashira to draw labs today around noon.     Sallie Perales RN  10/02/18  9:24 AM

## 2018-10-04 LAB — VIT C SERPL-MCNC: 11 UMOL/L (ref 23–114)

## 2018-10-08 ENCOUNTER — TELEPHONE (OUTPATIENT)
Dept: FAMILY MEDICINE | Facility: CLINIC | Age: 73
End: 2018-10-08

## 2018-10-08 ENCOUNTER — CARE COORDINATION (OUTPATIENT)
Dept: NEPHROLOGY | Facility: CLINIC | Age: 73
End: 2018-10-08

## 2018-10-08 ENCOUNTER — TELEPHONE (OUTPATIENT)
Dept: TRANSPLANT | Facility: CLINIC | Age: 73
End: 2018-10-08

## 2018-10-08 ENCOUNTER — DOCUMENTATION ONLY (OUTPATIENT)
Dept: CARE COORDINATION | Facility: CLINIC | Age: 73
End: 2018-10-08

## 2018-10-08 DIAGNOSIS — Z94.0 KIDNEY REPLACED BY TRANSPLANT: ICD-10-CM

## 2018-10-08 NOTE — PROGRESS NOTES
[facility] now requests orders and shares plan of care/discharge summaries for some patients through [employer].  Please REPLY TO THIS MESSAGE OR ROUTE BACK TO THE AUTHOR in order to give authorization for orders when needed.  This is considered a verbal order, you will still receive a faxed copy of orders for signature.  Thank you for your assistance in improving collaboration for our patients.    ORDER  SN visits every 3 weeks for catheter changes and management. 7 PRN visits for labs, change of condition, or other needs. HHA visits twice weekly this week and 3 times weekly for 8 weeks for bathing and brandie care.    Yashira Chiu RN  [phone]

## 2018-10-08 NOTE — TELEPHONE ENCOUNTER
ISSUE:  Creatinine elevated at 1.97.  Baseline 1.5-1.8  Tacrolimus 3.2 goal 4-6 per Dr Ley 9/2018    OUTCOME:   Call placed to pt.  Pt reports she has been hydrating well and denies recent illnesses, medication changes or s/s UTI.  Pt does report she usually doesn't have any s/s when she has a UTI.  Pt was recently hospitalized with a UTI 9/2018 and completed abx 4 3 days ago.  Pt encouraged to cont hydrating well 2-3l/day and will repeat creatinine level and UA/UC Friday 10/12/18.  Pt reports good 12 hour trough and confirms her current dose 1.5 mg BID.  Pt will cont on this dose and get a drug level with Friday's labs.      RNCC spoke with Yashira RUANO regarding pt lab results.  She also confirmed good drug level and current dose.  She took verbal orders for repeat creatinine, UA/UC and tacrolimus.

## 2018-10-08 NOTE — TELEPHONE ENCOUNTER
Reason for Call:  Home Health Care    Yashira with [facility] called requesting a verbal order    Orders are needed for this patient. Skilled Nursing    Skilled Nursing: Flu shot for pt    Pt Provider: Klaudia    Phone Number Homecare Nurse can be reached at: [phone]     Can we leave a detailed message on this number? yes    Best Time: anytime    Call taken on 10/8/2018 at 9:17 AM by Delma Flores

## 2018-10-08 NOTE — PROGRESS NOTES
Reason for Call    Spoke with patient. States she's feeling fine. She's a bit unsure how her blood pressure's doing, as her home cuff doesn't match up with home care cuffs. Denied symptoms. She is wondering how often she needs to have her labs drawn, will speak with coordinator.    Called Yashira. Said BP has been unremarkable to her knowledge (she's currently driving and cannot check). She'll call back this afternoon after she sees patient, will provide new nurse's contact information at that time.    Spoke with Yashira. States she only sees patient once every 3 weeks, so she isn't sure how BP readings are doing. She will see patient again on Friday and follow up.    Collaboration    Above discussed with team.    Patient Education    1. Call this nurse if systolic (top number) blood pressure is consistently <110 or >160 for further instructions.     Plan    1. Monitor BP daily  2. Call if any change with symptoms or concerns    Patient was given an opportunity to ask questions and have those questions answered to their satisfaction.  Patient verbalized understanding of instructions provided and agreed to plan of care.    Helena Rogel RN

## 2018-10-09 ENCOUNTER — ALLIED HEALTH/NURSE VISIT (OUTPATIENT)
Dept: PHARMACY | Facility: CLINIC | Age: 73
End: 2018-10-09
Payer: COMMERCIAL

## 2018-10-09 ENCOUNTER — TELEPHONE (OUTPATIENT)
Dept: FAMILY MEDICINE | Facility: CLINIC | Age: 73
End: 2018-10-09

## 2018-10-09 DIAGNOSIS — Z79.4 TYPE 2 DIABETES MELLITUS WITH DIABETIC POLYNEUROPATHY, WITH LONG-TERM CURRENT USE OF INSULIN (H): Primary | ICD-10-CM

## 2018-10-09 DIAGNOSIS — E11.42 TYPE 2 DIABETES MELLITUS WITH DIABETIC POLYNEUROPATHY, WITH LONG-TERM CURRENT USE OF INSULIN (H): Primary | ICD-10-CM

## 2018-10-09 DIAGNOSIS — I10 BENIGN ESSENTIAL HYPERTENSION: ICD-10-CM

## 2018-10-09 PROCEDURE — 99606 MTMS BY PHARM EST 15 MIN: CPT | Performed by: PHARMACIST

## 2018-10-09 PROCEDURE — 99607 MTMS BY PHARM ADDL 15 MIN: CPT | Performed by: PHARMACIST

## 2018-10-09 NOTE — MR AVS SNAPSHOT
After Visit Summary   10/9/2018    Gem Jade    MRN: [id number]           Patient Information     Date Of Birth          1945        Visit Information        Provider Department      10/9/2018 3:30 PM Conchita Toledo, [facility]        Today's Diagnoses     Type 2 diabetes mellitus with diabetic polyneuropathy, with long-term current use of insulin (H)    -  1    Benign essential hypertension           Follow-ups after your visit        Your next 10 appointments already scheduled     Nov 01, 2018  3:30 PM CDT   TELEMEDICINE with Conchita Toledo [facility]    [address] [place] [address]  [address] [place] [id number] [phone]           Note: this is not an onsite visit; there is no need to come to the facility.            Nov 07, 2018 12:00 PM CST   Community Paramedic with [facility] Paramedic 1, [facility]   [facility] [place]    [address] [place] [address]  [address] [place] [id number] [phone]              Who to contact     If you have questions or need follow up information about today's clinic visit or your schedule please contact [facility] directly at [phone].  Normal or non-critical lab and imaging results will be communicated to you by MyChart, letter or phone within 4 business days after the clinic has received the results. If you do not hear from us within 7 days, please contact the clinic through MyChart or phone. If you have a critical or abnormal lab result, we will notify you by phone as soon as possible.  Submit refill requests through [employer] or call your pharmacy and they will forward the refill request to us. Please allow 3 business days for your refill to be completed.          Additional Information About  Your Visit        [employer] Information     [employer] gives you secure access to your electronic health record. If you see a primary care provider, you can also send messages to your care team and make appointments. If you have questions, please call your primary care clinic.  If you do not have a primary care provider, please call [phone] and they will assist you.        Care EveryWhere ID     This is your Care EveryWhere ID. This could be used by other organizations to access your [place] medical records  [id number]         Blood Pressure from Last 3 Encounters:   10/10/18 152/70   09/19/18 147/60   09/14/18 132/76    Weight from Last 3 Encounters:   09/17/18 223 lb 4.8 oz (101.3 kg)   07/08/18 223 lb 15.8 oz (101.6 kg)   06/01/18 232 lb 2.3 oz (105.3 kg)              Today, you had the following     No orders found for display         Today's Medication Changes          These changes are accurate as of 10/9/18 11:59 PM.  If you have any questions, ask your nurse or doctor.               These medicines have changed or have updated prescriptions.        Dose/Directions    hydrocortisone 1%/eucerin 1% compounded cream   This may have changed:    - when to take this  - reasons to take this   Used for:  Rash and nonspecific skin eruption        Apply topically 2 times daily   Quantity:  30 g   Refills:  1                Primary Care Provider Office Phone # Fax #    ANTHONY [facility] [phone]       [address] [place] [address] [address] [place] [id number]        Goals        General    Medical (pt-stated)     Notes - Note created  7/11/2018  3:20 PM by Kassandra Khalil, RN    #1-Goal Statement: I will reduce the number of UTI's I experience  Measure of Success: reduction in number of UTI's  Supportive Steps to Achieve: home care nurse to request orders for more frequent catheter changes and increased pericare per week  Barriers: resistant UTI's  Strengths: assistance as above  Date to  Achieve By: 8-15-18          Equal Access to Services     [facility] PETER : Hadii aad ku hadchristinajavier Goodman, wabettyjorje dai, indujessica hurd. So [facility] [phone].    ATENCIÓN: Si habla español, tiene a peguero disposición servicios gratuitos de asistencia lingüística. Llame al [phone].    We comply with applicable federal civil rights laws and [place] laws. We do not discriminate on the basis of race, color, national origin, age, disability, sex, sexual orientation, or gender identity.            Thank you!     Thank you for choosing [facility]  for your care. Our goal is always to provide you with excellent care. Hearing back from our patients is one way we can continue to improve our services. Please take a few minutes to complete the written survey that you may receive in the mail after your visit with us. Thank you!             Your Updated Medication List - Protect others around you: Learn how to safely use, store and throw away your medicines at www.disposemymeds.org.          This list is accurate as of 10/9/18 11:59 PM.  Always use your most recent med list.                   Brand Name Dispense Instructions for use Diagnosis    ACCU-CHEK COMPACT PLUS test strip   Generic drug:  blood glucose monitoring     102 each    USE TO TEST BLOOD SUGAR THREE TIMES A DAY OR AS DIRECTED    Diabetic polyneuropathy associated with type 2 diabetes mellitus (H)       ACETAMINOPHEN PO      Take 325-650 mg by mouth every 4 hours as needed.        aspirin 81 MG tablet      Take 81 mg by mouth daily        baclofen 10 MG tablet    LIORESAL    20 tablet    Take 1 tablet (10 mg) by mouth 2 times daily as needed for other (Bladder Spasms)    Urinary catheter (Johnson) change required       Cranberry 400 MG Tabs      Take 400 mg by mouth 2 times daily        cyanocobalamin 1000 MCG/ML injection    VITAMIN B12    3 mL    Inject 1 mL (1,000 mcg) into  the muscle every 30 days    Iron deficiency anemia, unspecified iron deficiency anemia type       famotidine 20 MG tablet    PEPCID    60 tablet    Take 1 tablet (20 mg) by mouth 2 times daily        gabapentin 300 MG capsule    NEURONTIN    360 capsule    Take 2 capsules (600 mg) by mouth 2 times daily May take an additional 300mg at bedtime daily with increase pain    Diabetic polyneuropathy associated with type 2 diabetes mellitus (H)       hydrocortisone 1%/eucerin 1% compounded cream     30 g    Apply topically 2 times daily    Rash and nonspecific skin eruption       insulin pen needle 31G X 6 MM    COMFORT EZ PEN NEEDLES    100 each    Use 4 pen needles daily or as directed.    Type 2 diabetes mellitus (H)       labetalol 300 MG tablet    NORMODYNE    120 tablet    Take 0.5 tablets (150 mg) by mouth 2 times daily Take 1/2 tablet (150mg) =300mg. Hold for systolic BP less than 120.    Benign essential hypertension, Kidney replaced by transplant       lactobacillus rhamnosus (GG) capsule     60 capsule    Take 1 capsule by mouth 2 times daily    Diarrhea       LANTUS SOLOSTAR 100 UNIT/ML injection   Generic drug:  insulin glargine     15 mL    Inject 22 Units Subcutaneous At Bedtime    Type 2 diabetes mellitus with diabetic polyneuropathy, with long-term current use of insulin (H)       levETIRAcetam 750 MG tablet    KEPPRA    60 tablet    Take 1 tablet (750 mg) by mouth 2 times daily        levothyroxine 50 MCG tablet    SYNTHROID/LEVOTHROID    90 tablet    TAKE ONE TABLET BY MOUTH EVERY DAY    Other specified hypothyroidism       lidocaine 2 % topical gel    XYLOCAINE    30 mL    Apply topically as needed for moderate pain 10 ML every 3 weeks    Johnson catheter in place       loratadine 10 MG tablet    CLARITIN    90 tablet    Take 1 tablet (10 mg) by mouth daily    Acute nasopharyngitis       losartan 25 MG tablet    COZAAR    30 tablet    Take 0.5 tablets (12.5 mg) by mouth daily    Kidney replaced by  "transplant, Persistent proteinuria       NovoLOG FLEXPEN 100 UNIT/ML injection   Generic drug:  insulin aspart     15 mL    Inject Subcutaneous 3 times daily (with meals) Sliding scale    Type 2 diabetes mellitus with diabetic polyneuropathy, with long-term current use of insulin (H)       ondansetron 4 MG ODT tab    ZOFRAN ODT    20 tablet    Take 1 tablet (4 mg) by mouth daily as needed for nausea 30 minutes before getting up in your wheelchair    Vertigo       order for DME     1 each    Equipment being ordered: [employer] gel-T cushion 20 inches wide & 18 inches deep  height 1.676 m (5' 6\"), weight 101.6 kg (223 lb 15.8 oz)    Back pain       order for DME     1 Box    Equipment being ordered: Tegaderm 4x4  MICHELLE 6 months Sacral wound    Decubitus ulcer of sacral region, unspecified ulcer stage       pravastatin 10 MG tablet    PRAVACHOL    90 tablet    Take 1 tablet (10 mg) by mouth daily    Hyperlipidemia LDL goal <100       predniSONE 5 MG tablet    DELTASONE    90 tablet    Take 1 tablet (5 mg) by mouth daily    Kidney replaced by transplant       simethicone 125 MG Chew chewable tablet    MYLICON    120 tablet    Take 1 tablet (125 mg) by mouth as needed for intestinal gas        * tacrolimus 1 MG capsule    GENERIC EQUIVALENT    120 capsule    Take 2 capsules (2 mg) by mouth 2 times daily    Kidney replaced by transplant       * tacrolimus 0.5 MG capsule    GENERIC EQUIVALENT    30 capsule    HOLD    Kidney replaced by transplant       VITAMIN D (CHOLECALCIFEROL) PO      Take 4,000 Units by mouth 2 times daily        * Notice:  This list has 2 medication(s) that are the same as other medications prescribed for you. Read the directions carefully, and ask your doctor or other care provider to review them with you.      "

## 2018-10-09 NOTE — Clinical Note
Shantelle is requesting having Deepti/Prieto draw blood instead of HCRN since RN is having a hard time finding veins. Can you please address?  She is having next blood draw on Friday and CPs scheduled to come out tomorrow

## 2018-10-09 NOTE — PROGRESS NOTES
SUBJECTIVE/OBJECTIVE:                Gem Jade is a 72 year old female called for a follow-up visit for Medication Therapy Management.  She was referred to me from Marivel Barcenas.     Chief Complaint: Follow up from our visit on 18.    Tobacco: History of tobacco dependence- quit  Alcohol: not currently using    Medication Adherence/Access:  Patient has assistance with medication administration and setting up boxes ( Keshawn).     Diabetes:  Pt currently taking Lantus 22u daily and novolog 10 units + 2-8 units sliding scale with dinner and Novolog sliding scale only with breakfast. Pt is not experiencing side effects.   Sliding scale:   140-239=2 units  240-349=4 units  350-439=6 units  >440=8 units  SMB-3 times daily.   Ranges (read off from glucometer):   Date FBG/ 2hours post Lunch/2hours post Dinner/2hours post Bedtime    10/9 199/330      10/8 162  220 188    251  266     259  220     352       346  424       371    Patient is not experiencing hypoglycemia  Recent symptoms of high blood sugar? none  Eye exam: due  Foot exam: due  ACEi/ARB: Losartan  Urine Albumin:   Lab Results   Component Value Date    UMALCR 6776.56 (H) 2017      Aspirin: Taking 81mg daily and denies side effects  Diet/Exercise: Consistently having Ensure or small breakfast and meal for dinner.    Lab Results   Component Value Date    A1C 8.2 2018    A1C 8.8 2018    A1C 6.3 2018    A1C 6.7 2017    A1C 7.7 2017     Hypertension: Currently taking losartan 12.5mg daily, labetalol 150mg BID PRN only for high systolic BP >150 (has used 1 dose in the last week).  No side effects reported. Blood pressures are being checked by HCRN and self - reports systolic 90-140s and diastolic 40-70s. BP also followed by her kidney transplant team.    BP Readings from Last 3 Encounters:   18 147/60   18 132/76   18 132/76     Today's Vitals: There were no vitals taken for this  visit. - phone visit      ASSESSMENT:              Current medications were reviewed today as discussed above.      Medication Adherence: good, no issues identified    diabetes: improved, continue to monitor    Hypertension: improved. Scr has increased since starting ARB by ~25%, which is less than threshold for discontinuation at 30% or greater. Will monitor, pt is scheduled for SCr recheck ordered by nephrology; pt requesting community paramedic (CP) to draw instead of HCRN and will look into this for her.       PLAN:                  1. No medication changes  2. Will follow-up with PCP regarding blood draw by HCRN vs CP    I spent 30 minutes with this patient today. All changes were made via collaborative practice agreement with Marivel Barcenas. A copy of the visit note was provided to the patient's primary care provider.     Will follow up in 3 weeks.    The patient declined a summary of these recommendations as an after visit summary.    Conchita Toledo, PharmD, BCACP

## 2018-10-09 NOTE — TELEPHONE ENCOUNTER
Reason for Call:  Home Health Care    Yashira with [facility] called regarding orders for labs    Orders are needed for this patient.   Labs: Yashira requested clarification on home care orders due to receiving conflicting orders    Pt Provider: Klaudia    Phone Number Homecare Nurse can be reached at: [phone]     Can we leave a detailed message on this number? Yes    Best Time: Anytime    Call taken on 10/9/2018 at 3:58 PM by Delma Flores

## 2018-10-10 ENCOUNTER — HOSPITAL ENCOUNTER (OUTPATIENT)
Facility: CLINIC | Age: 73
Setting detail: SPECIMEN
Discharge: HOME | End: 2018-10-10
Admitting: INTERNAL MEDICINE
Payer: MEDICARE

## 2018-10-10 DIAGNOSIS — Z94.0 KIDNEY TRANSPLANTED: ICD-10-CM

## 2018-10-10 LAB
ERYTHROCYTE [DISTWIDTH] IN BLOOD BY AUTOMATED COUNT: 14.3 % (ref 10–15)
HCT VFR BLD AUTO: 34.2 % (ref 35–47)
HGB BLD-MCNC: 10.8 G/DL (ref 11.7–15.7)
MCH RBC QN AUTO: 29.8 PG (ref 26.5–33)
MCHC RBC AUTO-ENTMCNC: 31.6 G/DL (ref 31.5–36.5)
MCV RBC AUTO: 95 FL (ref 78–100)
PLATELET # BLD AUTO: 154 10E9/L (ref 150–450)
RBC # BLD AUTO: 3.62 10E12/L (ref 3.8–5.2)
WBC # BLD AUTO: 5.6 10E9/L (ref 4–11)

## 2018-10-10 PROCEDURE — 80197 ASSAY OF TACROLIMUS: CPT | Performed by: INTERNAL MEDICINE

## 2018-10-10 PROCEDURE — 84156 ASSAY OF PROTEIN URINE: CPT | Performed by: INTERNAL MEDICINE

## 2018-10-10 PROCEDURE — 80048 BASIC METABOLIC PNL TOTAL CA: CPT | Performed by: INTERNAL MEDICINE

## 2018-10-10 PROCEDURE — 36415 COLL VENOUS BLD VENIPUNCTURE: CPT | Performed by: INTERNAL MEDICINE

## 2018-10-10 PROCEDURE — 85027 COMPLETE CBC AUTOMATED: CPT | Performed by: INTERNAL MEDICINE

## 2018-10-11 ENCOUNTER — VIRTUAL VISIT (OUTPATIENT)
Dept: FAMILY MEDICINE | Facility: CLINIC | Age: 73
End: 2018-10-11
Payer: COMMERCIAL

## 2018-10-11 DIAGNOSIS — I10 BENIGN ESSENTIAL HYPERTENSION: ICD-10-CM

## 2018-10-11 DIAGNOSIS — E11.42 TYPE 2 DIABETES MELLITUS WITH DIABETIC POLYNEUROPATHY, WITH LONG-TERM CURRENT USE OF INSULIN (H): Primary | ICD-10-CM

## 2018-10-11 DIAGNOSIS — Z79.4 TYPE 2 DIABETES MELLITUS WITH DIABETIC POLYNEUROPATHY, WITH LONG-TERM CURRENT USE OF INSULIN (H): Primary | ICD-10-CM

## 2018-10-11 LAB
ANION GAP SERPL CALCULATED.3IONS-SCNC: 8 MMOL/L (ref 3–14)
BUN SERPL-MCNC: 46 MG/DL (ref 7–30)
CALCIUM SERPL-MCNC: 8.7 MG/DL (ref 8.5–10.1)
CHLORIDE SERPL-SCNC: 100 MMOL/L (ref 94–109)
CO2 SERPL-SCNC: 26 MMOL/L (ref 20–32)
CREAT SERPL-MCNC: 1.61 MG/DL (ref 0.52–1.04)
CREAT UR-MCNC: 8 MG/DL
GFR SERPL CREATININE-BSD FRML MDRD: 31 ML/MIN/1.7M2
GLUCOSE SERPL-MCNC: 127 MG/DL (ref 70–99)
POTASSIUM SERPL-SCNC: 4.5 MMOL/L (ref 3.4–5.3)
PROT UR-MCNC: 0.42 G/L
PROT/CREAT 24H UR: 5.27 G/G CR (ref 0–0.2)
SODIUM SERPL-SCNC: 134 MMOL/L (ref 133–144)
TACROLIMUS BLD-MCNC: <3 UG/L (ref 5–15)
TME LAST DOSE: ABNORMAL H

## 2018-10-11 PROCEDURE — 98968 PH1 ASSMT&MGMT NQHP 21-30: CPT | Performed by: NURSE PRACTITIONER

## 2018-10-11 RX ORDER — TACROLIMUS 0.5 MG/1
CAPSULE ORAL
Qty: 30 CAPSULE | Refills: 0 | Status: SHIPPED | OUTPATIENT
Start: 2018-10-11 | End: 2018-11-05

## 2018-10-11 RX ORDER — TACROLIMUS 1 MG/1
2 CAPSULE ORAL 2 TIMES DAILY
Qty: 120 CAPSULE | Refills: 11 | Status: SHIPPED | OUTPATIENT
Start: 2018-10-11 | End: 2018-11-05

## 2018-10-11 NOTE — MR AVS SNAPSHOT
After Visit Summary   10/11/2018    Gem Jade    MRN: [id number]           Patient Information     Date Of Birth          1945        Visit Information        Provider Department      10/11/2018 1:00 PM Marivel Barcenas APRN CNP [facility]        Today's Diagnoses     Type 2 diabetes mellitus with diabetic polyneuropathy, with long-term current use of insulin (H)    -  1    Benign essential hypertension          Care Instructions    No med changes today    Please try and take check your BS more and your BP    I will call you in one month    Keep moving as much as possible and get up in your chair when you can           Follow-ups after your visit        Follow-up notes from your care team     Return in about 4 weeks (around 11/8/2018) for phone visit, Routine Visit.      Your next 10 appointments already scheduled     Nov 07, 2018 12:00 PM [facility]   Community Paramedic with [facility] Paramedic 1, [facility] Par[facility]ic 2   [facility] ([facility])    [address] [place] [address]  [address]  [facility] [address] [phone]              Who to contact     If you have questions or need follow up information about today's clinic visit or your schedule please contact [facility] directly at [phone].  Normal or non-critical lab and imaging results will be communicated to you by MyChart, letter or phone within 4 business days after the clinic has received the results. If you do not hear from us within 7 days, please contact the clinic through MyChart or phone. If you have a critical or abnormal lab result, we will notify you by phone as soon as possible.  Submit refill requests through [employer] or call your pharmacy and they will forward the refill request to us. Please allow 3 business days for your refill to be completed.          Additional Information About Your  Visit        [employer] Information     [employer] gives you secure access to your electronic health record. If you see a primary care provider, you can also send messages to your care team and make appointments. If you have questions, please call your primary care clinic.  If you do not have a primary care provider, please call [phone] and they will assist you.        Care EveryWhere ID     This is your Care EveryWhere ID. This could be used by other organizations to access your [facility] medical records  [id number]         Blood Pressure from Last 3 Encounters:   10/10/18 152/70   09/19/18 147/60   09/14/18 132/76    Weight from Last 3 Encounters:   09/17/18 223 lb 4.8 oz (101.3 kg)   07/08/18 223 lb 15.8 oz (101.6 kg)   06/01/18 232 lb 2.3 oz (105.3 kg)              Today, you had the following     No orders found for display         Today's Medication Changes          These changes are accurate as of 10/11/18 11:59 PM.  If you have any questions, ask your nurse or doctor.               These medicines have changed or have updated prescriptions.        Dose/Directions    hydrocortisone 1%/eucerin 1% compounded cream   This may have changed:    - when to take this  - reasons to take this   Used for:  Rash and nonspecific skin eruption        Apply topically 2 times daily   Quantity:  30 g   Refills:  1                Primary Care Provider Office Phone # Fax #    ANTHON[facility] [phone]       [address] [place] [address]  [facility] [address]        Goals        General    Medical (pt-stated)     Notes - Note created  7/11/2018  3:20 PM by Kassandra Khalil, RN    #1-Goal Statement: I will reduce the number of UTI's I experience  Measure of Success: reduction in number of UTI's  Supportive Steps to Achieve: home care nurse to request orders for more frequent catheter changes and increased pericare per week  Barriers: resistant UTI's  Strengths: assistance as above  Date to Achieve  By: 8-15-18          Equal Access to Services     [facility] Center[facility] : Hadii aad ku hadchristinajavier Goodman, wabettyda tyrelabhiha, qairineomeaghan banksmajessica villalta. So [facility] [phone].    ATENCIÓN: Si habla español, tiene a peguero disposición servicios gratuitos de asistencia lingüística. Kishorame al [phone].    We comply with applicable federal civil rights laws and [place] laws. We do not discriminate on the basis of race, color, national origin, age, disability, sex, sexual orientation, or gender identity.            Thank you!     Thank you for choosing [facility]  for your care. Our goal is always to provide you with excellent care. Hearing back from our patients is one way we can continue to improve our services. Please take a few minutes to complete the written survey that you may receive in the mail after your visit with us. Thank you!             Your Updated Medication List - Protect others around you: Learn how to safely use, store and throw away your medicines at www.disposemymeds.org.          This list is accurate as of 10/11/18 11:59 PM.  Always use your most recent med list.                   Brand Name Dispense Instructions for use Diagnosis    ACCU-CHEK COMPACT PLUS test strip   Generic drug:  blood glucose monitoring     102 each    USE TO TEST BLOOD SUGAR THREE TIMES A DAY OR AS DIRECTED    Diabetic polyneuropathy associated with type 2 diabetes mellitus (H)       ACETAMINOPHEN PO      Take 325-650 mg by mouth every 4 hours as needed.        aspirin 81 MG tablet      Take 81 mg by mouth daily        baclofen 10 MG tablet    LIORESAL    20 tablet    Take 1 tablet (10 mg) by mouth 2 times daily as needed for other (Bladder Spasms)    Urinary catheter (Johnson) change required       Cranberry 400 MG Tabs      Take 400 mg by mouth 2 times daily        cyanocobalamin 1000 MCG/ML injection    VITAMIN B12    3 mL    Inject 1 mL (1,000 mcg) into the muscle  every 30 days    Iron deficiency anemia, unspecified iron deficiency anemia type       famotidine 20 MG tablet    PEPCID    60 tablet    Take 1 tablet (20 mg) by mouth 2 times daily        gabapentin 300 MG capsule    NEURONTIN    360 capsule    Take 2 capsules (600 mg) by mouth 2 times daily May take an additional 300mg at bedtime daily with increase pain    Diabetic polyneuropathy associated with type 2 diabetes mellitus (H)       hydrocortisone 1%/eucerin 1% compounded cream     30 g    Apply topically 2 times daily    Rash and nonspecific skin eruption       insulin pen needle 31G X 6 MM    COMFORT EZ PEN NEEDLES    100 each    Use 4 pen needles daily or as directed.    Type 2 diabetes mellitus (H)       labetalol 300 MG tablet    NORMODYNE    120 tablet    Take 0.5 tablets (150 mg) by mouth 2 times daily Take 1/2 tablet (150mg) =300mg. Hold for systolic BP less than 120.    Benign essential hypertension, Kidney replaced by transplant       lactobacillus rhamnosus (GG) capsule     60 capsule    Take 1 capsule by mouth 2 times daily    Diarrhea       LANTUS SOLOSTAR 100 UNIT/ML injection   Generic drug:  insulin glargine     15 mL    Inject 22 Units Subcutaneous At Bedtime    Type 2 diabetes mellitus with diabetic polyneuropathy, with long-term current use of insulin (H)       levETIRAcetam 750 MG tablet    KEPPRA    60 tablet    Take 1 tablet (750 mg) by mouth 2 times daily        levothyroxine 50 MCG tablet    SYNTHROID/LEVOTHROID    90 tablet    TAKE ONE TABLET BY MOUTH EVERY DAY    Other specified hypothyroidism       lidocaine 2 % topical gel    XYLOCAINE    30 mL    Apply topically as needed for moderate pain 10 ML every 3 weeks    Johnson catheter in place       loratadine 10 MG tablet    CLARITIN    90 tablet    Take 1 tablet (10 mg) by mouth daily    Acute nasopharyngitis       losartan 25 MG tablet    COZAAR    30 tablet    Take 0.5 tablets (12.5 mg) by mouth daily    Kidney replaced by transplant,  "Persistent proteinuria       NovoLOG FLEXPEN 100 UNIT/ML injection   Generic drug:  insulin aspart     15 mL    Inject Subcutaneous 3 times daily (with meals) Sliding scale    Type 2 diabetes mellitus with diabetic polyneuropathy, with long-term current use of insulin (H)       ondansetron 4 MG ODT tab    ZOFRAN ODT    20 tablet    Take 1 tablet (4 mg) by mouth daily as needed for nausea 30 minutes before getting up in your wheelchair    Vertigo       order for DME     1 each    Equipment being ordered: [employer] gel-T cushion 20 inches wide & 18 inches deep  height 1.676 m (5' 6\"), weight 101.6 kg (223 lb 15.8 oz)    Back pain       order for DME     1 Box    Equipment being ordered: Tegaderm 4x4  MICHELLE 6 months Sacral wound    Decubitus ulcer of sacral region, unspecified ulcer stage       pravastatin 10 MG tablet    PRAVACHOL    90 tablet    Take 1 tablet (10 mg) by mouth daily    Hyperlipidemia LDL goal <100       predniSONE 5 MG tablet    DELTASONE    90 tablet    Take 1 tablet (5 mg) by mouth daily    Kidney replaced by transplant       simethicone 125 MG Chew chewable tablet    MYLICON    120 tablet    Take 1 tablet (125 mg) by mouth as needed for intestinal gas        * tacrolimus 1 MG capsule    GENERIC EQUIVALENT    120 capsule    Take 2 capsules (2 mg) by mouth 2 times daily    Kidney replaced by transplant       * tacrolimus 0.5 MG capsule    GENERIC EQUIVALENT    30 capsule    HOLD    Kidney replaced by transplant       VITAMIN D (CHOLECALCIFEROL) PO      Take 4,000 Units by mouth 2 times daily        * Notice:  This list has 2 medication(s) that are the same as other medications prescribed for you. Read the directions carefully, and ask your doctor or other care provider to review them with you.      "

## 2018-10-11 NOTE — TELEPHONE ENCOUNTER
ISSUE:  Creatinine has returned to baseline.    Tacrolimus <3  Goal 4-6 per Dr Ley 9/2018    OUTCOME:   Call placed to pt and her  (Chang).  They report this was a good 12 hour drug level and confirmed her current dose of 1.5 mg BID.  Pt denies missed doses, medication changes or illnesses.  Chang will increase pt Tacrolimus dose to 2 mg BID.  Claudia HCRN aware of dose change and will schedule a time to go to their home next week for a lab draw.  Verbal order for tacrolimus given.     Pt questions answered, pt v/u.

## 2018-10-11 NOTE — PROGRESS NOTES
"Gem Jade is a 72 year old female who is being evaluated via a billable telephone visit.      The patient has been notified of following:     \"This telephone visit will be conducted via a call between you and your physician/provider. We have found that certain health care needs can be provided without the need for a physical exam.  This service lets us provide the care you need with a short phone conversation.  If a prescription is necessary we can send it directly to your pharmacy.  If lab work is needed we can place an order for that and you can then stop by our lab to have the test done at a later time.    If during the course of the call the physician/provider feels a telephone visit is not appropriate, you will not be charged for this service.\"     Consent has been obtained for this service by 1 care team member: yes. See the scanned image in the medical record.    Gem Jade complains of    Chief Complaint   Patient presents with     Phone Visit       I have reviewed and updated the patient's Past Medical History, Social History, Family History and Medication List.    ALLERGIES  Bactrim ds [sulfamethoxazole w/trimethoprim]; Atorvastatin calcium; Codeine; Darvocet [propoxyphene n-apap]; Hydromorphone; Levofloxacin; Morphine; Niaspan [niacin]; Percocet [oxycodone-acetaminophen]; and Vicodin [hydrocodone-acetaminophen]    Cecilia Hernandez MA   (MA signature)    Additional provider notes:    Will be having a new [facility] RN so hoping she comes more regularly and can draw her blood     Mental Health Symptoms      Duration: Back to baseline, feeling better    Description:  Depression: no  Anxiety: YES- off and on over her DM and her kidney  Sleep problems: YES- chronic, unusual sleep patterns  Concentration problems: YES- off and on    Therapies tried and outcome: counseling off and on     See PHQ-9 and LISSY scores, as appropriate          Diabetes Follow-up        Patient is checking blood sugars:  only 2x " daily at most      Blood sugar testing frequency justification: Uncontrolled DM, numbers have improved       Results are as follows:       fasting- see below     nonfasting - see below    Diabetic concerns: blood sugar frequently over 200     Symptoms of hypoglycemia (low blood sugar): none     Paresthesias (numbness or burning in feet) or sores: chronic neuropathy     Conchita Toledo, [facility]        9/27/18 11:42 AM   Note      Called to f/u on hyperglycemia. Spoke with pt's /caretaker Keshawn.     Currently taking Lantus 22u daily. No hypoglycemia     BS:  Date FBG/ 2hours post Lunch/2hours post Dinner /2hours post     9/27 203         9/26 226   245           /353                                                         Reviewed medications and helped with med rec. States he did not set up med box with labetolol because no way to check BP readings currently, still looking into purchasing a new BP cuff or verifying reliability one of their current cuffs. No visits with home care scheduled yet for next week but awaiting call back from HCRN.       Plan:  No change in insulin  Start labetalol as directed     F/u 2 weeks     Conchita Toledo, PharmD, BCACP           Hypertension Follow-up        Outpatient blood pressures are being checked at home by [facility], being followed by her kidney transplant team, see note above, rarely taking labetalol due to low BP       Low Salt Diet: no added salt     Assessment/Plan:  ASSESSMENT / PLAN:  (E11.42,  Z79.4) Type 2 diabetes mellitus with diabetic polyneuropathy, with long-term current use of insulin (H)  (primary encounter diagnosis)  Comment: Continue current meds  Plan: please try and check your sugars more regularly     (I10) Benign essential hypertension  Comment: stable   Plan: please check your BP more regularly so we can dose your labetalol better         I have reviewed the note as documented above.  This accurately captures the substance of my conversation with the  patient.  Klaudia Barcenas FNP      Total time of call between patient and provider was 30 minutes     Peyton Raymond CMA

## 2018-10-11 NOTE — LETTER
My Depression Action Plan  Name: Gem Jade   Date of Birth 1945  Date: 10/11/2018    My doctor: Marivel Barcenas   My clinic: [facility]  [address] [place] [address]  [address]  [facility] [address]  [phone]          GREEN    ZONE   Good Control    What it looks like:     Things are going generally well. You have normal up s and down s. You may even feel depressed from time to time, but bad moods usually last less than a day.   What you need to do:  1. Continue to care for yourself (see self care plan)  2. Check your depression survival kit and update it as needed  3. Follow your physician s recommendations including any medication.  4. Do not stop taking medication unless you consult with your physician first.           YELLOW         ZONE Getting Worse    What it looks like:     Depression is starting to interfere with your life.     It may be hard to get out of bed; you may be starting to isolate yourself from others.    Symptoms of depression are starting to last most all day and this has happened for several days.     You may have suicidal thoughts but they are not constant.   What you need to do:     1. Call your care team, your response to treatment will improve if you keep your care team informed of your progress. Yellow periods are signs an adjustment may need to be made.     2. Continue your self-care, even if you have to fake it!    3. Talk to someone in your support network    4. Open up your depression survival kit           RED    ZONE Medical Alert - Get Help    What it looks like:     Depression is seriously interfering with your life.     You may experience these or other symptoms: You can t get out of bed most days, can t work or engage in other necessary activities, you have trouble taking care of basic hygiene, or basic responsibilities, thoughts of suicide or death that will not go away, self-injurious behavior.     What you need to  do:  1. Call your care team and request a same-day appointment. If they are not available (weekends or after hours) call your local crisis line, emergency room or 911.            Depression Self Care Plan / Survival Kit    Self-Care for Depression  Here s the deal. Your body and mind are really not as separate as most people think.  What you do and think affects how you feel and how you feel influences what you do and think. This means if you do things that people who feel good do, it will help you feel better.  Sometimes this is all it takes.  There is also a place for medication and therapy depending on how severe your depression is, so be sure to consult with your medical provider and/ or Behavioral Health Consultant if your symptoms are worsening or not improving.     In order to better manage my stress, I will:    Exercise  Get some form of exercise, every day. This will help reduce pain and release endorphins, the  feel good  chemicals in your brain. This is almost as good as taking antidepressants!  This is not the same as joining a gym and then never going! (they count on that by the way ) It can be as simple as just going for a walk or doing some gardening, anything that will get you moving.      Hygiene   Maintain good hygiene (Get out of bed in the morning, Make your bed, Brush your teeth, Take a shower, and Get dressed like you were going to work, even if you are unemployed).  If your clothes don't fit try to get ones that do.    Diet  I will strive to eat foods that are good for me, drink plenty of water, and avoid excessive sugar, caffeine, alcohol, and other mood-altering substances.  Some foods that are helpful in depression are: complex carbohydrates, B vitamins, flaxseed, fish or fish oil, fresh fruits and vegetables.    Psychotherapy  I agree to participate in Individual Therapy (if recommended).    Medication  If prescribed medications, I agree to take them.  Missing doses can result in serious  side effects.  I understand that drinking alcohol, or other illicit drug use, may cause potential side effects.  I will not stop my medication abruptly without first discussing it with my provider.    Staying Connected With Others  I will stay in touch with my friends, family members, and my primary care provider/team.    Use your imagination  Be creative.  We all have a creative side; it doesn t matter if it s oil painting, sand castles, or mud pies! This will also kick up the endorphins.    Witness Beauty  (AKA stop and smell the roses) Take a look outside, even in mid-winter. Notice colors, textures. Watch the squirrels and birds.     Service to others  Be of service to others.  There is always someone else in need.  By helping others we can  get out of ourselves  and remember the really important things.  This also provides opportunities for practicing all the other parts of the program.    Humor  Laugh and be silly!  Adjust your TV habits for less news and crime-drama and more comedy.    Control your stress  Try breathing deep, massage therapy, biofeedback, and meditation. Find time to relax each day.     My support system    Clinic Contact:  Phone number:    Contact 1:  Phone number:    Contact 2:  Phone number:    Judaism/:  Phone number:    Therapist:  Phone number:    Local crisis center:    Phone number:    Other community support:  Phone number:

## 2018-10-12 ENCOUNTER — TELEPHONE (OUTPATIENT)
Dept: NEPHROLOGY | Facility: CLINIC | Age: 73
End: 2018-10-12

## 2018-10-12 NOTE — TELEPHONE ENCOUNTER
Spoke with Shabbir (Joahna is out). Advised it is okay to wait until next week as she's already had labs done this week. Updated Yashira.    Helena Rogel RN

## 2018-10-12 NOTE — PATIENT INSTRUCTIONS
No med changes today    Please try and take check your BS more and your BP    I will call you in one month    Keep moving as much as possible and get up in your chair when you can

## 2018-10-12 NOTE — TELEPHONE ENCOUNTER
Received call from Yashira. [facility] paramedics were sent to draw labs and check on patient on Wednesday. Yashira has her on her schedule today for a lab draw. She wants to know if the order (UA/UC) can wait until next week when she draws the prograf level, or if it needs to be done today.    Message sent to patient's coordinator to advise.    Helena Rogel RN

## 2018-10-17 ENCOUNTER — TELEPHONE (OUTPATIENT)
Dept: NEPHROLOGY | Facility: CLINIC | Age: 73
End: 2018-10-17

## 2018-10-17 DIAGNOSIS — Z94.0 KIDNEY REPLACED BY TRANSPLANT: ICD-10-CM

## 2018-10-17 DIAGNOSIS — I10 BENIGN ESSENTIAL HYPERTENSION: ICD-10-CM

## 2018-10-17 LAB
ALBUMIN UR-MCNC: 30 MG/DL
ANION GAP SERPL CALCULATED.3IONS-SCNC: 7 MMOL/L (ref 3–14)
APPEARANCE UR: ABNORMAL
BACTERIA #/AREA URNS HPF: ABNORMAL /HPF
BILIRUB UR QL STRIP: NEGATIVE
BUN SERPL-MCNC: 52 MG/DL (ref 7–30)
CALCIUM SERPL-MCNC: 8.1 MG/DL (ref 8.5–10.1)
CHLORIDE SERPL-SCNC: 104 MMOL/L (ref 94–109)
CO2 SERPL-SCNC: 25 MMOL/L (ref 20–32)
COLOR UR AUTO: ABNORMAL
CREAT SERPL-MCNC: 1.66 MG/DL (ref 0.52–1.04)
GFR SERPL CREATININE-BSD FRML MDRD: 30 ML/MIN/1.7M2
GLUCOSE SERPL-MCNC: 152 MG/DL (ref 70–99)
GLUCOSE UR STRIP-MCNC: 30 MG/DL
HGB UR QL STRIP: ABNORMAL
KETONES UR STRIP-MCNC: NEGATIVE MG/DL
LEUKOCYTE ESTERASE UR QL STRIP: ABNORMAL
MUCOUS THREADS #/AREA URNS LPF: PRESENT /LPF
NITRATE UR QL: NEGATIVE
PH UR STRIP: 6 PH (ref 5–7)
POTASSIUM SERPL-SCNC: 4.8 MMOL/L (ref 3.4–5.3)
RBC #/AREA URNS AUTO: 15 /HPF (ref 0–2)
SODIUM SERPL-SCNC: 136 MMOL/L (ref 133–144)
SOURCE: ABNORMAL
SP GR UR STRIP: 1.01 (ref 1–1.03)
SQUAMOUS #/AREA URNS AUTO: 1 /HPF (ref 0–1)
TACROLIMUS BLD-MCNC: 4 UG/L (ref 5–15)
TME LAST DOSE: ABNORMAL H
UROBILINOGEN UR STRIP-MCNC: NORMAL MG/DL (ref 0–2)
WBC #/AREA URNS AUTO: >182 /HPF (ref 0–5)
WBC CLUMPS #/AREA URNS HPF: PRESENT /HPF
YEAST #/AREA URNS HPF: ABNORMAL /HPF

## 2018-10-17 PROCEDURE — 80048 BASIC METABOLIC PNL TOTAL CA: CPT | Performed by: INTERNAL MEDICINE

## 2018-10-17 PROCEDURE — 87088 URINE BACTERIA CULTURE: CPT | Performed by: INTERNAL MEDICINE

## 2018-10-17 PROCEDURE — 80197 ASSAY OF TACROLIMUS: CPT | Performed by: INTERNAL MEDICINE

## 2018-10-17 PROCEDURE — 87086 URINE CULTURE/COLONY COUNT: CPT | Performed by: INTERNAL MEDICINE

## 2018-10-17 PROCEDURE — 81001 URINALYSIS AUTO W/SCOPE: CPT | Performed by: INTERNAL MEDICINE

## 2018-10-17 PROCEDURE — 87186 SC STD MICRODIL/AGAR DIL: CPT | Performed by: INTERNAL MEDICINE

## 2018-10-17 NOTE — TELEPHONE ENCOUNTER
Called patient's  to verify medication list. States Gem is taking losartan 12.5mg daily. She takes labetalol as needed for systolic over 120. They will begin keeping a daily BP log and follow up in 2 weeks.    Helena Rogel RN

## 2018-10-17 NOTE — TELEPHONE ENCOUNTER
Received call from Yashira patient's home care nurse. BP today was 205/95 with heart rate in the 70's. Patient had not yet taken any medications (she had a prograf level drawn) and was asymptomatic. She took her meds before nurse left.    Update sent to Dr. Heath.    Helena Rogel RN

## 2018-10-18 ENCOUNTER — TELEPHONE (OUTPATIENT)
Dept: TRANSPLANT | Facility: CLINIC | Age: 73
End: 2018-10-18

## 2018-10-18 DIAGNOSIS — N39.0 URINARY TRACT INFECTION: ICD-10-CM

## 2018-10-18 DIAGNOSIS — Z94.0 KIDNEY TRANSPLANTED: Primary | ICD-10-CM

## 2018-10-18 LAB
BACTERIA SPEC CULT: ABNORMAL
Lab: ABNORMAL
SPECIMEN SOURCE: ABNORMAL

## 2018-10-18 RX ORDER — CEFPODOXIME PROXETIL 100 MG/1
100 TABLET, FILM COATED ORAL 2 TIMES DAILY
Qty: 20 TABLET | Refills: 0 | Status: SHIPPED | OUTPATIENT
Start: 2018-10-18 | End: 2019-01-14

## 2018-10-18 NOTE — TELEPHONE ENCOUNTER
Ross Elizabeth MD Etcheverry, Katherine, RN                   Urinary tract infection and recommend starting cefpodoxime 100 mg twice daily for 10 days.  Bacterial culture sensitivities are pending.       Call placed to Claudia BROWN RN, no answer.  Left message letting her know pt has a UTI and is being started on Cefpodoxime. Pt to have UA/UC and BMP collected 3-4 days following abx completion.     RNCC spoke with Chang (pt ) regarding pt's UTI and need to start abx.  ABX sent to pt preferred pharmacy. Pt will start medication tonight.  Pt aware we will want her to get another UA/UC following abx treatment along with BMP.    Chang voiced frustration regarding pt frequent hospitalizations and wondering how often the UA/UC should be drawn normally.  Chang educated they should f/u with Marivel, pt NP who is managing her catheter.  Pt also wondering how often her labs should be drawn.  Gem educated from a transplant standpoint, monthly.  She will f/u with her NP to see how often they would like her labs.  They also had further questions regarding her elevated BP this week. RNCC reviewed note by Helena regarding BP and to monitor BP daily x 2 weeks and report levels to her.  Pt has further questions.  Pt aware Helena will reach out to them regarding BP questions.      Pt questions answered, pt v/u.

## 2018-10-18 NOTE — TELEPHONE ENCOUNTER
Spoke with Chang. He is a little concerned about her BP cuff. She has a wrist cuff, and is continually 20-30 points lower in readings than what the home care nurse gets on her cuff. They were advised to use a wrist cuff as patient's upper arm is harder to get readings on. Wondering what they should do, as an [employer] wrist cuff costs $50. Update sent to provider.    Helena Rogel RN

## 2018-10-19 NOTE — TELEPHONE ENCOUNTER
Per Dr. Ley:    She should elevated her wrist to the level of her heart so that the cuff is sitting on her chest when measuring the blood pressure.  Check to see the difference in the reading when she does it with this technique     Spoke with Gem, states she does this when checking BP. She's not sure they can afford a new cuff. Will continue to monitor as they are able.    Helena Rogel RN

## 2018-10-25 ENCOUNTER — PATIENT OUTREACH (OUTPATIENT)
Dept: CARE COORDINATION | Facility: CLINIC | Age: 73
End: 2018-10-25

## 2018-10-25 ASSESSMENT — ACTIVITIES OF DAILY LIVING (ADL): DEPENDENT_IADLS:: CLEANING;COOKING;LAUNDRY;SHOPPING;MEAL PREPARATION;MEDICATION MANAGEMENT;TRANSPORTATION

## 2018-10-25 NOTE — PROGRESS NOTES
"Clinic Care Coordination Contact  Care Team Conversations    See 10-11-18 phone visit with PCP.  still looking for the \"perfect blood pressure machine\". Waiting for advice from her specialists but people aren't calling him back. Machine options reviewed with .     "

## 2018-10-25 NOTE — PROGRESS NOTES
Clinic Care Coordination Contact  Care Team Conversations    According to her , Gem is taking 12.5mg of losartan daily and 150mg of labetalol prn for systolic over 120. They are not checking BP daily, but will start doing this and I will check in again in 2 weeks.            Previous Messages       ----- Message -----      From: Kassandra Khalil RN      Sent: 10/17/2018   3:46 PM        To: Helena Rogel RN   Subject: FW: mutual patient plan                           I don't. You mentioned that she was on labetalol but just hadn't taken it today so I took that as what she was taking right now. You could outreach to the home care nurse or the patient for confirmation. I'm pretty sure she has a compliance problem and basically does what she wants, if I recall a comment from her care team....   ----- Message -----      From: Helena Rogel RN      Sent: 10/17/2018   3:45 PM        To: Kassandra Khalil RN, *   Subject: RE: mutual patient plan                           Do you know which medication she is taking now, and the dose?   ----- Message -----      From: Kassandra Khalil RN      Sent: 10/17/2018   3:36 PM        To: Helena Rogel RN, Viral Eduar Ley MD   Subject: FW: mutual patient plan                           If I recall per a recent hospital discharge note she was to transition off of labetalol and start losartan. She was told her kidney transplant may be failing and it was decided the losartan was the most kidney protective but the patient was non-compliant with the change.   ----- Message -----      From: Helena Rogel RN      Sent: 10/17/2018   3:34 PM        To: Kassandra Khalil RN, *   Subject: RE: mutual patient plan                           She is on labetalol, but hadn't taken it prior to the reading today. I don't know how well she's tracking her BP at home. Home care isn't there very often, it's not sounding like.   ----- Message -----      From: Marcus Ley  MD Eduar      Sent: 10/17/2018   3:20 PM        To: Kassandra Khalil RN, Helena Rogel RN   Subject: RE: mutual patient plan                           Is there a particular reason why she is not on any blood pressure medications? (i.e. Side effects, medication not available)     -Viral     ----- Message -----      From: Helena Rogel RN      Sent: 10/17/2018   3:06 PM        To: Kassandra Khalil RN, *   Subject: RE: mutual patient plan                           Hello,     Just wanted to provide a quick update. I tried reaching out to her about BP, but it didn't sound like she was keeping track at home. Her home care nurse called today and BP was 205/95 with pulse in the 70's. She had not yet taken any BP medications and was asymptomatic.     I'll chart the note and try to keep checking in.     Helena Rosario   ----- Message -----      From: Kassandra Khalil RN      Sent: 9/25/2018   7:48 AM        To: Helena Rogel RN   Subject: FW: mutual patient plan                           Hmmm. She is very worried about her kidney transplant as they told her that it might be failing and I emphatically reiterated that the nephrologist said that the losartan offered the best protection for her kidney so I'm not sure why she changed her mind. It's not going to hurt her to stay on the labetalol but the discharge plan is for her to transition through you guys to losartan alone. Guess just keep educating her on the benefit and maybe she'll come around. I'll reach out to her too. Thanks so much for your help!     Porsha   ----- Message -----      From: Helena Rogel RN      Sent: 9/24/2018   3:15 PM        To: Kassandra Khalil RN   Subject: RE: mutual patient plan                           I just spoke with her. Sounds like she may not have been taking labetalol as prescribed (there was some confusion about the dose). She's going to resume the dose as ordered, and check in again if it's not improving. So she  didn't want to move forward with the losartan increase. I'll plan to check in with her if I don't hear anything in the next couple of weeks.     Let me know if there's anything else you need!   ----- Message -----      From: Kassandra Khalil RN      Sent: 9/24/2018   2:27 PM        To: Helena Rogel RN   Subject: FW: mutual patient plan                           Call her.  does all cares but she's on the ball and can let him know or you could ask if she wants you to talk with him too. So either way I guess :)   ----- Message -----      From: Helena Rogel RN      Sent: 9/24/2018   1:57 PM        To: Kassandra Khalil RN   Subject: RE: mutual patient plan                           Will do! Does she manage her medications herself, or is there someone else I should be calling?   ----- Message -----      From: Kassandra Khalil RN      Sent: 9/24/2018   1:53 PM        To: Helena Rogel RN   Subject: FW: mutual patient plan                           Yes please and thanks to you and Dr. Ley for the speedy care plan!!     Porsha   ----- Message -----      From: Helena Rogel RN      Sent: 9/24/2018   8:26 AM        To: Kassandra Khalil RN   Subject: FW: mutual patient plan                           Here's Dr. Ley's recommendation. Would you like me to call her?     Helena Rosario   ----- Message -----      From: Marcus Ley MD      Sent: 9/21/2018   4:07 PM        To: Helena Rogel RN   Subject: RE: mutual patient plan                           Ok to manage via phone.  We can inc losartan to 25 mg daily     -Viral   ----- Message -----      From: Helena Rogel RN      Sent: 9/21/2018   2:54 PM        To: Marcus Ley MD   Subject: FW: mutual patient plan                           Would you be comfortable managing BP over the phone? She does not have any follow up scheduled in clinic.   ----- Message -----      From: Kassandra Khalil RN      Sent: 9/21/2018   2:44 PM         To: Helena Rogel, RN   Subject: mutual patient plan                               David Malik:     Here's the hospital discharge note for this patient:     # Acute Kidney Injury in prior Kidney Transplant - Baseline creatinine appears to be between 1.2-1.5 and on admission up to 2.10, likely secondary to sepsis. Trended down with fluids to 1.57 at discharge. US of Transplanted Kidney was without hydronephrosis.  Transplant Nephrology followed throughout course, and given supratherapeutic tacrolimus levels they also went down on her dose. At discharge she will continue prednisone 5 mg and tacrolimus 1.5 mg BID.        # Hypertension   # Paroxysymal Atrial Fibrillation   # Proteinuria   She was initially maintained on her home regimen of clonidine 0.1 mg BID and labetalol, but given her persistent proteinuria she was started on low dose losartan, clonidine was stopped. She will continue labetalol at discharge, but the outpatient intention is to transition her to losartan alone for the most renal-protective strategy (transplant nephrology to manage)     bp 170/88 per home care nurse at visit yesterday. On labetolol and losartan. She saw Dr. Ley and another doc I think while inpatient. She is pretty absolutely homebound. Wondering if you can talk with Dr. Ley to see if he can follow her by phone. Will be getting regular visits from home care who can do vitals and labs as needed. Or let me know what you think. She's pretty scared as they gave her bad news about her kidney transplant possibly starting to fail and that she would not be eligible for a new one (I don't recall the reason). So if you can work out a plan and let me know that would be great.     Thanks!     Porsha Khalil R.N.   Clinic Care Coordinator   [facility]   [phone]

## 2018-10-26 ENCOUNTER — TELEPHONE (OUTPATIENT)
Dept: TRANSPLANT | Facility: CLINIC | Age: 73
End: 2018-10-26

## 2018-10-26 NOTE — TELEPHONE ENCOUNTER
Claudia called verbalizing she received our VM last week regarding pt UTI and ABX.  She verbalized she will get pt labs next week along with pt transplant labs following abx completion.

## 2018-10-31 ENCOUNTER — TELEPHONE (OUTPATIENT)
Dept: FAMILY MEDICINE | Facility: CLINIC | Age: 73
End: 2018-10-31

## 2018-11-01 ENCOUNTER — CARE COORDINATION (OUTPATIENT)
Dept: NEPHROLOGY | Facility: CLINIC | Age: 73
End: 2018-11-01

## 2018-11-01 ENCOUNTER — ALLIED HEALTH/NURSE VISIT (OUTPATIENT)
Dept: PHARMACY | Facility: CLINIC | Age: 73
End: 2018-11-01
Payer: COMMERCIAL

## 2018-11-01 DIAGNOSIS — E11.42 TYPE 2 DIABETES MELLITUS WITH DIABETIC POLYNEUROPATHY, WITH LONG-TERM CURRENT USE OF INSULIN (H): ICD-10-CM

## 2018-11-01 DIAGNOSIS — I10 BENIGN ESSENTIAL HYPERTENSION: ICD-10-CM

## 2018-11-01 DIAGNOSIS — Z94.0 KIDNEY REPLACED BY TRANSPLANT: Primary | ICD-10-CM

## 2018-11-01 DIAGNOSIS — Z79.4 TYPE 2 DIABETES MELLITUS WITH DIABETIC POLYNEUROPATHY, WITH LONG-TERM CURRENT USE OF INSULIN (H): ICD-10-CM

## 2018-11-01 PROCEDURE — 99606 MTMS BY PHARM EST 15 MIN: CPT | Performed by: PHARMACIST

## 2018-11-01 PROCEDURE — 99607 MTMS BY PHARM ADDL 15 MIN: CPT | Performed by: PHARMACIST

## 2018-11-01 NOTE — PROGRESS NOTES
Nephrology Note: Nursing Outreach Encounter    REASON FOR CALL:                                                      REASON FOR CALL: Blood Pressure Follow Up                                          SITUATION/BACKROUND:                                                    Patient is being treated for HTN.    Recently got a new BP cuff, had concerns their previous cuff was inaccurate.      ASSESSMENT:                                                      Spoke with Chang. States patient is feeling very well today, denied symptoms. She is having trouble with her catheter, which is leaking. They've called her PCP about this. BP averaging 110-140 / 39-79 at home. She did have one dose of labetalol today for a BP of 154/80. He is content with these readings and wants to continue to monitor.    PLAN:                                                      Follow Up:   Follow up in 3-4 weeks     Patient verbalized understanding and will contact the clinic with any further questions or concerns.     Helena Rogel RN

## 2018-11-01 NOTE — PROGRESS NOTES
SUBJECTIVE/OBJECTIVE:                Gem Jade is a 72 year old female called for a follow-up visit for Medication Therapy Management.  She was referred to me from Marivel Barcenas.     Chief Complaint: Follow up from our visit on 10/9/18.  Wants to clarify how she's supposed to be taking labetalol   Tobacco: History of tobacco dependence- quit  Alcohol: not currently using    Medication Adherence/Access:  No issues found.   Patient has assistance with medication administration and setting up boxes ( [place]).     Diabetes:  Pt currently taking Lantus 22u daily and novolog 10 units + 2-8 units sliding scale with dinner and Novolog sliding scale only with breakfast. Pt is not experiencing side effects.   Sliding scale:   140-239=2 units  240-349=4 units  350-439=6 units  >440=8 units  SMB-3 times daily.   Ranges (pt reported):   Usually 150-200, lowest 107 and highest 300s when she had UTI  Patient is not experiencing hypoglycemia  Recent symptoms of high blood sugar? none  Eye exam: due  Foot exam: due  ACEi/ARB: Losartan  Urine Albumin:   Lab Results   Component Value Date    UMALCR 6776.56 (H) 2017      Aspirin: Taking 81mg daily and denies side effects  Diet/Exercise: Consistently having Ensure or small breakfast and meal for dinner.  Lab Results   Component Value Date    A1C 8.2 2018    A1C 8.8 2018    A1C 6.3 2018    A1C 6.7 2017    A1C 7.7 2017       Hypertension: Currently taking losartan 12.5mg daily, labetalol 150mg BID PRN only for high systolic BP >150. She was also given instructions to take for systolic>120 but doesn't want to do this, worried about hypotension.  No side effects reported. Blood pressures are being checked by HCRN and self - reports systolic 100-140s. BP followed by her kidney transplant team, spoke with RN today.    S/p Kidney Transplant: Currently taking tacrolimus 2mg twice daily and prednisone 5mg every day. Denies SE. Lab  monitoring up to date.   States mood has been down recently; she has been very worried about how long her kidney is going to last, worries she is not a transplant or dialysis candidate.       Today's Vitals: There were no vitals taken for this visit. - phone visit      ASSESSMENT:              Current medications were reviewed today as discussed above.      Medication Adherence: excellent, no issues identified    Diabetes: improved, no medication changes today. Continue to monitor    Hypertension: improved. Will verify with nephrology that pt prefers to take labetalol only for systolic >150.     S/p kidney transplant: stable. Offered looking into in-home therapy options around anxiety with transplant failure, pt declines at this time.        PLAN:                  Will verify dosing of labetalol with nephrology - staff message sent to RN    I spent 40 minutes with this patient today. All changes were made via collaborative practice agreement with Marivel Barcenas. A copy of the visit note was provided to the patient's primary care provider.     Will follow up in 1 month.    The patient declined a summary of these recommendations as an after visit summary.    Conchita Toledo, PharmD, BCACP

## 2018-11-02 LAB
ALBUMIN UR-MCNC: 100 MG/DL
ANION GAP SERPL CALCULATED.3IONS-SCNC: 6 MMOL/L (ref 3–14)
APPEARANCE UR: ABNORMAL
BACTERIA #/AREA URNS HPF: ABNORMAL /HPF
BASOPHILS # BLD AUTO: 0 10E9/L (ref 0–0.2)
BASOPHILS NFR BLD AUTO: 0.5 %
BILIRUB UR QL STRIP: NEGATIVE
BUN SERPL-MCNC: 56 MG/DL (ref 7–30)
CALCIUM SERPL-MCNC: 8.7 MG/DL (ref 8.5–10.1)
CHLORIDE SERPL-SCNC: 108 MMOL/L (ref 94–109)
CO2 SERPL-SCNC: 24 MMOL/L (ref 20–32)
COLOR UR AUTO: ABNORMAL
CREAT SERPL-MCNC: 1.58 MG/DL (ref 0.52–1.04)
DIFFERENTIAL METHOD BLD: ABNORMAL
EOSINOPHIL # BLD AUTO: 0.3 10E9/L (ref 0–0.7)
EOSINOPHIL NFR BLD AUTO: 4.4 %
ERYTHROCYTE [DISTWIDTH] IN BLOOD BY AUTOMATED COUNT: 14.6 % (ref 10–15)
GFR SERPL CREATININE-BSD FRML MDRD: 32 ML/MIN/1.7M2
GLUCOSE SERPL-MCNC: 106 MG/DL (ref 70–99)
GLUCOSE UR STRIP-MCNC: NEGATIVE MG/DL
HCT VFR BLD AUTO: 33.2 % (ref 35–47)
HGB BLD-MCNC: 10.2 G/DL (ref 11.7–15.7)
HGB UR QL STRIP: ABNORMAL
IMM GRANULOCYTES # BLD: 0 10E9/L (ref 0–0.4)
IMM GRANULOCYTES NFR BLD: 0.2 %
KETONES UR STRIP-MCNC: NEGATIVE MG/DL
LEUKOCYTE ESTERASE UR QL STRIP: ABNORMAL
LYMPHOCYTES # BLD AUTO: 1.9 10E9/L (ref 0.8–5.3)
LYMPHOCYTES NFR BLD AUTO: 30.2 %
MCH RBC QN AUTO: 29.3 PG (ref 26.5–33)
MCHC RBC AUTO-ENTMCNC: 30.7 G/DL (ref 31.5–36.5)
MCV RBC AUTO: 95 FL (ref 78–100)
MONOCYTES # BLD AUTO: 0.5 10E9/L (ref 0–1.3)
MONOCYTES NFR BLD AUTO: 8.5 %
NEUTROPHILS # BLD AUTO: 3.5 10E9/L (ref 1.6–8.3)
NEUTROPHILS NFR BLD AUTO: 56.2 %
NITRATE UR QL: NEGATIVE
NRBC # BLD AUTO: 0 10*3/UL
NRBC BLD AUTO-RTO: 0 /100
PH UR STRIP: 5.5 PH (ref 5–7)
PLATELET # BLD AUTO: 149 10E9/L (ref 150–450)
POTASSIUM SERPL-SCNC: 4.7 MMOL/L (ref 3.4–5.3)
RBC # BLD AUTO: 3.48 10E12/L (ref 3.8–5.2)
RBC #/AREA URNS AUTO: 25 /HPF (ref 0–2)
SODIUM SERPL-SCNC: 138 MMOL/L (ref 133–144)
SOURCE: ABNORMAL
SP GR UR STRIP: 1.01 (ref 1–1.03)
TACROLIMUS BLD-MCNC: 3.8 UG/L (ref 5–15)
TME LAST DOSE: ABNORMAL H
UROBILINOGEN UR STRIP-MCNC: NORMAL MG/DL (ref 0–2)
WBC # BLD AUTO: 6.1 10E9/L (ref 4–11)
WBC #/AREA URNS AUTO: >182 /HPF (ref 0–5)
WBC CLUMPS #/AREA URNS HPF: PRESENT /HPF
YEAST #/AREA URNS HPF: ABNORMAL /HPF

## 2018-11-02 PROCEDURE — 87086 URINE CULTURE/COLONY COUNT: CPT | Performed by: FAMILY MEDICINE

## 2018-11-02 PROCEDURE — 80048 BASIC METABOLIC PNL TOTAL CA: CPT | Performed by: FAMILY MEDICINE

## 2018-11-02 PROCEDURE — 81001 URINALYSIS AUTO W/SCOPE: CPT | Performed by: FAMILY MEDICINE

## 2018-11-02 PROCEDURE — 85025 COMPLETE CBC W/AUTO DIFF WBC: CPT | Performed by: FAMILY MEDICINE

## 2018-11-02 PROCEDURE — 80197 ASSAY OF TACROLIMUS: CPT | Performed by: FAMILY MEDICINE

## 2018-11-02 PROCEDURE — 87106 FUNGI IDENTIFICATION YEAST: CPT | Performed by: FAMILY MEDICINE

## 2018-11-04 LAB
BACTERIA SPEC CULT: ABNORMAL
Lab: ABNORMAL
SPECIMEN SOURCE: ABNORMAL

## 2018-11-05 ENCOUNTER — TELEPHONE (OUTPATIENT)
Dept: TRANSPLANT | Facility: CLINIC | Age: 73
End: 2018-11-05

## 2018-11-05 DIAGNOSIS — Z94.0 KIDNEY REPLACED BY TRANSPLANT: ICD-10-CM

## 2018-11-05 NOTE — TELEPHONE ENCOUNTER
ISSUE:   Tacrolimus level 3.8 on 11/2/18, goal 4-6, dose 2 mg BID    PLAN:   Please call pt and confirm this was a good 12-hour trough. Verify dose 2 mg BID. Confirm no new medications or illness (adelita. Diarrhea). If good trough, increase dose to 2.5 mg BID and recheck level in 1-2 weeks.  Please place order.

## 2018-11-05 NOTE — TELEPHONE ENCOUNTER
[place] call [place] [facility] wanted to alert our care team the results of patient's transplant labs were sent to their department and would like to ensure patient's transplant coordinator looks over the results. Patient's Tacrolimus level is below the normal range.

## 2018-11-05 NOTE — LETTER
PHYSICIAN ORDERS      DATE & TIME ISSUED: 2018 3:35 PM  PATIENT NAME: Gem Jade   : 1945     [facility] MR# [if applicable]: [id number]     DIAGNOSIS:  Kidney transplant   ICD-10 CODE: Z94.0      Please increase tacrolimus dose to 2.5 mg twice daily and repeat level in 1-2 weeks of dose change.    Tacrolimus level    Any questions please call: [phone] option #5    Please fax results to [phone].

## 2018-11-05 NOTE — TELEPHONE ENCOUNTER
Call placed to patient. Patient confirms current dose and accurate trough level. Notes that she was treated for UTI with abx until 10/27/18 and denies diarrhea h\e states that she take Lomotil almost daily.  Patient v\u to increase her tacrolimus to 2.5 mg BID and repeat level in 1-2 weeks. Order/rx sent    Call placed to patient MEET Taylor no answer. Voice message left with instructions for dose change. Order sent

## 2018-11-06 ENCOUNTER — TELEPHONE (OUTPATIENT)
Dept: TRANSPLANT | Facility: CLINIC | Age: 73
End: 2018-11-06

## 2018-11-06 DIAGNOSIS — Z94.0 KIDNEY REPLACED BY TRANSPLANT: ICD-10-CM

## 2018-11-06 RX ORDER — TACROLIMUS 0.5 MG/1
0.5 CAPSULE ORAL 2 TIMES DAILY
Qty: 60 CAPSULE | Refills: 0 | Status: SHIPPED | OUTPATIENT
Start: 2018-11-06 | End: 2018-11-08 | Stop reason: DRUGHIGH

## 2018-11-06 RX ORDER — TACROLIMUS 1 MG/1
2 CAPSULE ORAL 2 TIMES DAILY
Qty: 120 CAPSULE | Refills: 0 | Status: SHIPPED | OUTPATIENT
Start: 2018-11-06 | End: 2018-11-08

## 2018-11-06 NOTE — TELEPHONE ENCOUNTER
Claudia returned call to office and stated patient does not have any 0.5mg tacrolimus capsules on hand. Claudia requests call back at [phone] to discuss.

## 2018-11-06 NOTE — TELEPHONE ENCOUNTER
Call returned to  SEKOU Taylor. Writer informed her that patient can begin the requested dose once she receives the new Rx and instructed her to complete labs in 1-2 weeks of dose change. She v\u

## 2018-11-06 NOTE — TELEPHONE ENCOUNTER
Patient Call: Medication Clarification  Route to LPN  Name of Medication: Tacro Dose: the  has some concerns regarding the dosage changes.    Call back needed? Yes    Return Call Needed  Same as documented in contacts section  When to return call?: Same day: Route High Priority

## 2018-11-06 NOTE — TELEPHONE ENCOUNTER
Spoke with pt and her  regarding his questions re tac.  He verbalized pt goal on mychart shows 3-5 and is wondering why we are increasing her dose to 2.5 mg based off her last tac level 3.8.  Chang and Gem aware per our nephrology team, her goal is listed as 4-6.  Chang aware we will re review this with the team as pt is >65 and has hx recurrent UTIs and no hx of rejection.  Pt will cont tac 2 mg BID for now until RNCC can review pt IS goals.  Pt aware we will call her later this week regarding drug goals.      Pt questions answered, pt v/u.

## 2018-11-08 RX ORDER — TACROLIMUS 1 MG/1
2 CAPSULE ORAL 2 TIMES DAILY
Qty: 120 CAPSULE | Refills: 11 | Status: SHIPPED | OUTPATIENT
Start: 2018-11-08 | End: 2019-01-01

## 2018-11-08 NOTE — TELEPHONE ENCOUNTER
Rachael Javier RN Etcheverry, Katherine, RN Cc: Marcus Ley MD Katie,   I reviewed with Dr. Ley. Tac goal is ok at 3-5.   Rachael       Call placed to Chang, pt .  He is aware of pt new tac goal. Chang aware to have pt cont 2 mg BID until otherwise noted.      Chang's questions answered, he v/u.

## 2018-11-13 ENCOUNTER — VIRTUAL VISIT (OUTPATIENT)
Dept: FAMILY MEDICINE | Facility: CLINIC | Age: 73
End: 2018-11-13
Payer: COMMERCIAL

## 2018-11-13 DIAGNOSIS — Z53.9 ERRONEOUS ENCOUNTER--DISREGARD: Primary | ICD-10-CM

## 2018-11-13 PROCEDURE — 99207 ZZC NO BILLABLE SERVICE THIS VISIT: CPT | Performed by: NURSE PRACTITIONER

## 2018-11-18 NOTE — Clinical Note
Please schedule a phone visit the beginning of Feb Please call her [facility] nurse and let her know I have added labs to her draw on Thursday
Please schedule an afternoon phone visit the beginning of Feb.
Self

## 2018-11-21 ENCOUNTER — TELEPHONE (OUTPATIENT)
Dept: TRANSPLANT | Facility: CLINIC | Age: 73
End: 2018-11-21

## 2018-11-21 NOTE — TELEPHONE ENCOUNTER
Provider Call: Transplant Lab/Orders  Route to LPN  Post Transplant Days: [id number]  When patient is less than 60 days post-transplant, route high priority  Reason for Call: Clarification; which order? Schedule  Liver patients reporting abnormal lab results: Route to RN and Page  Document lab facility information when provider is calling about annual lab orders. Delete facility wildcards when not needed.    Callback needed? Yes    Return Call Needed  Same as documented in contacts section  When to return call?: Greater than one day: Route standard priority

## 2018-11-23 ENCOUNTER — CARE COORDINATION (OUTPATIENT)
Dept: NEPHROLOGY | Facility: CLINIC | Age: 73
End: 2018-11-23

## 2018-11-23 NOTE — PROGRESS NOTES
Nephrology Note: Nursing Outreach Encounter    REASON FOR CALL:                                                      REASON FOR CALL: Blood Pressure Follow Up                                          SITUATION/BACKROUND:                                                    Patient is being treated for HTN.    Uses a wrist cuff at home.      ASSESSMENT:                                                      Spoke with Chang. Patient continues to feel well, no symptoms. They're monitoring her catheter site, as she has frequent infections (he's focusing on cleaning it more regularly). BP continues to fluctuate but he is happy with the readings for now.    Uremic Symptoms: No       PLAN:                                                      Follow Up:   Follow up call in 3-4 weeks     Patient verbalized understanding and will contact the clinic with any further questions or concerns.     Helena Rogel RN

## 2018-11-27 ENCOUNTER — TELEPHONE (OUTPATIENT)
Dept: FAMILY MEDICINE | Facility: CLINIC | Age: 73
End: 2018-11-27

## 2018-11-27 NOTE — TELEPHONE ENCOUNTER
Received home health care orders , filled out and faxed back to [phone].     Jade Bergeron, CMA on 11/27/2018 at 3:11 PM

## 2018-11-30 ENCOUNTER — VIRTUAL VISIT (OUTPATIENT)
Dept: FAMILY MEDICINE | Facility: CLINIC | Age: 73
End: 2018-11-30
Payer: COMMERCIAL

## 2018-11-30 DIAGNOSIS — E11.42 TYPE 2 DIABETES MELLITUS WITH DIABETIC POLYNEUROPATHY, WITH LONG-TERM CURRENT USE OF INSULIN (H): Primary | ICD-10-CM

## 2018-11-30 DIAGNOSIS — Z94.0 KIDNEY REPLACED BY TRANSPLANT: ICD-10-CM

## 2018-11-30 DIAGNOSIS — I10 BENIGN ESSENTIAL HYPERTENSION: ICD-10-CM

## 2018-11-30 DIAGNOSIS — M25.519 CHRONIC SHOULDER PAIN, UNSPECIFIED LATERALITY: ICD-10-CM

## 2018-11-30 DIAGNOSIS — G89.29 CHRONIC SHOULDER PAIN, UNSPECIFIED LATERALITY: ICD-10-CM

## 2018-11-30 DIAGNOSIS — Z79.4 TYPE 2 DIABETES MELLITUS WITH DIABETIC POLYNEUROPATHY, WITH LONG-TERM CURRENT USE OF INSULIN (H): Primary | ICD-10-CM

## 2018-11-30 PROCEDURE — 98968 PH1 ASSMT&MGMT NQHP 21-30: CPT | Performed by: NURSE PRACTITIONER

## 2018-11-30 NOTE — PROGRESS NOTES
"Gem Jade is a 73 year old female who is being evaluated via a billable telephone visit.      The patient has been notified of following:     \"This telephone visit will be conducted via a call between you and your physician/provider. We have found that certain health care needs can be provided without the need for a physical exam.  This service lets us provide the care you need with a short phone conversation.  If a prescription is necessary we can send it directly to your pharmacy.  If lab work is needed we can place an order for that and you can then stop by our lab to have the test done at a later time.    If during the course of the call the physician/provider feels a telephone visit is not appropriate, you will not be charged for this service.\"     Consent has been obtained for this service by 1 care team member: yes. See the scanned image in the medical record.    Gem Jade complains of  No chief complaint on file.      I have reviewed and updated the patient's Past Medical History, Social History, Family History and Medication List.    ALLERGIES  Bactrim ds [sulfamethoxazole w/trimethoprim]; Atorvastatin calcium; Codeine; Darvocet [propoxyphene n-apap]; Hydromorphone; Levofloxacin; Morphine; Niaspan [niacin]; Percocet [oxycodone-acetaminophen]; and Vicodin [hydrocodone-acetaminophen]    Cecilia Hernandez MA   (MA signature)    Additional provider notes:   Mental Health Symptoms       Duration: worse due to Keshawn's health issues and cath issues, worries about who changes her cath because has had some bad experiences with certain individuals     Description:  Depression: no  Anxiety: YES- off and on over her DM and her kidney  Sleep problems: YES- chronic, unusual sleep patterns  Concentration problems: YES- off and on    Therapies tried and outcome: counseling off and on      See PHQ-9 and LISSY scores, as appropriate             Diabetes Follow-up        Patient is checking blood sugars:  only 3x " daily at most      Blood sugar testing frequency justification: Uncontrolled DM, numbers have improved, lantus 22units at midnight  and sliding scale, forgetting to take her Novolog everyday at least 1x daily, relaying on Keshawn to remember to give it to her, he is having some health issues        Results are as follows:       fasting- 123,90,83    nonfasting - 300's off and on when doesn't  take Novolog     Diabetic concerns: blood sugar frequently over 200     Symptoms of hypoglycemia (low blood sugar): none     Paresthesias (numbness or burning in feet) or sores: chronic neuropathy at baseline     Hypertension Follow-up        Outpatient blood pressures are being checked at home by [facility], being followed by her kidney transplant team, taking labetalol as directed checking BP first so only taking it several times a week, taking losartan 12.5mg         Low Salt Diet: no added salt        11/23/18 11:38 AM   Note      Nephrology Note: Nursing Outreach Encounter     REASON FOR CALL:         REASON FOR CALL: Blood Pressure Follow Up         SITUATION/BACKROUND:      Patient is being treated for HTN.     Uses a wrist cuff at home.        ASSESSMENT:         Spoke with Chang. Patient continues to feel well, no symptoms. They're monitoring her catheter site, as she has frequent infections (he's focusing on cleaning it more regularly). BP continues to fluctuate but he is happy with the readings for now.     Uremic Symptoms: No         PLAN:         Follow Up:   Follow up call in 3-4 weeks      Patient verbalized understanding and will contact the clinic with any further questions or concerns.      Helena Rogel RN            Joint or Musculoskeletal Pain  Duration of complaint: shoulder pain baseline, generally better,  hasn't been up in her WC for a while because they need 2 people to get her up, no pain meds for quite awhile     Assessment/Plan:  ASSESSMENT / PLAN:  (E11.42,  Z79.4) Type 2 diabetes mellitus with diabetic  polyneuropathy, with long-term current use of insulin (H)  (primary encounter diagnosis)  Comment: worse due to diet and lack of appropriate insulin intake   Plan: discussed ways to take her insulin 2x daily, discussed ways to improve her diet     (I10) Benign essential hypertension  Comment: fluctuating, taking BP regularly, rarely taking the labetalol,     Plan: followed by Nephrology, continue labetalol and losartan     (Z94.0) Kidney replaced by transplant  Comment: slight decline in GFR and increase in creatinine   Plan: followed and monitored by nephrology     (M25.519,  G89.29) Chronic shoulder pain, unspecified laterality  Comment: improved   Plan: continue as needed topicals, acetaminophen     Patient Instructions   Please take your novolog on a more regular basis    Continue your BP meds as directed    We will repeat your BMP     I have reviewed the note as documented above.  This accurately captures the substance of my conversation with the patient.  Klaudia MTZ      Total time of call between patient and provider was 30 minutes

## 2018-11-30 NOTE — MR AVS SNAPSHOT
After Visit Summary   11/30/2018    Gem Jade    MRN: [id number]           Patient Information     Date Of Birth          1945        Visit Information        Provider Department      11/30/2018 2:00 PM Marivel Barcenas APRN CNP [facility]        Today's Diagnoses     Type 2 diabetes mellitus with diabetic polyneuropathy, with long-term current use of insulin (H)    -  1    Benign essential hypertension        Kidney replaced by transplant        Chronic shoulder pain, unspecified laterality          Care Instructions    Please take your novolog on a more regular basis          Follow-ups after your visit        Follow-up notes from your care team     Return in about 6 weeks (around 1/11/2019) for Routine Visit.      Your next 10 appointments already scheduled     Dec 06, 2018  3:30 PM CST   TELEMEDICINE with Conchita Toledo [facility] ([facility])    [address] [place] [id number] [phone]           Note: this is not an onsite visit; there is no need to come to the facility.              Who to contact     If you have questions or need follow up information about today's clinic visit or your schedule please contact [facility] directly at [phone].  Normal or non-critical lab and imaging results will be communicated to you by MyChart, letter or phone within 4 business days after the clinic has received the results. If you do not hear from us within 7 days, please contact the clinic through MyChart or phone. If you have a critical or abnormal lab result, we will notify you by phone as soon as possible.  Submit refill requests through [employer] or call your pharmacy and they will forward the refill request to us. Please allow 3 business days for your refill to be completed.          Additional Information  About Your Visit        [employer] Information     [facility] gives you secure access to your electronic health record. If you see a primary care provider, you can also send messages to your care team and make appointments. If you have questions, please call your primary care clinic.  If you do not have a primary care provider, please call [phone] and they will assist you.        Care EveryWhere ID     This is your Care EveryWhere ID. This could be used by other organizations to access your [facility] medical records  [id number]         Blood Pressure from Last 3 Encounters:   10/10/18 152/70   09/19/18 147/60   09/14/18 132/76    Weight from Last 3 Encounters:   09/17/18 223 lb 4.8 oz (101.3 kg)   07/08/18 223 lb 15.8 oz (101.6 kg)   06/01/18 232 lb 2.3 oz (105.3 kg)              Today, you had the following     No orders found for display         Today's Medication Changes          These changes are accurate as of 11/30/18 11:59 PM.  If you have any questions, ask your nurse or doctor.               These medicines have changed or have updated prescriptions.        Dose/Directions    hydrocortisone 1%/eucerin 1% compounded cream   This may have changed:    - when to take this  - reasons to take this   Used for:  Rash and nonspecific skin eruption        Apply topically 2 times daily   Quantity:  30 g   Refills:  1                Primary Care Provider Office Phone # Fax #    ANTHONY [facility] [phone]       [address] [place] [id number]        Goals        General    Medical (pt-stated)     Notes - Note created  7/11/2018  3:20 PM by Kassandra Khalil, RN    #1-Goal Statement: I will reduce the number of UTI's I experience  Measure of Success: reduction in number of UTI's  Supportive Steps to Achieve: home care nurse to request orders for more frequent catheter changes and increased pericare per week  Barriers: resistant UTI's  Strengths: assistance as above  Date  to Achieve By: 8-15-18          Equal Access to Services     CARLOS ENRIQUE GREEN : Marques Goodman, madhav dai, jessica paz. So [facility] [phone].    ATENCIÓN: Si habla español, tiene a peguero disposición servicios gratuitos de asistencia lingüística. Llame al [phone].    We comply with applicable federal civil rights laws and [place] laws. We do not discriminate on the basis of race, color, national origin, age, disability, sex, sexual orientation, or gender identity.            Thank you!     Thank you for choosing [facility]  for your care. Our goal is always to provide you with excellent care. Hearing back from our patients is one way we can continue to improve our services. Please take a few minutes to complete the written survey that you may receive in the mail after your visit with us. Thank you!             Your Updated Medication List - Protect others around you: Learn how to safely use, store and throw away your medicines at www.disposemymeds.org.          This list is accurate as of 11/30/18 11:59 PM.  Always use your most recent med list.                   Brand Name Dispense Instructions for use Diagnosis    ACCU-CHEK COMPACT PLUS test strip   Generic drug:  blood glucose monitoring     102 each    USE TO TEST BLOOD SUGAR THREE TIMES A DAY OR AS DIRECTED    Diabetic polyneuropathy associated with type 2 diabetes mellitus (H)       ACETAMINOPHEN PO      Take 325-650 mg by mouth every 4 hours as needed.        aspirin 81 MG tablet    ASA     Take 81 mg by mouth daily        baclofen 10 MG tablet    LIORESAL    20 tablet    Take 1 tablet (10 mg) by mouth 2 times daily as needed for other (Bladder Spasms)    Urinary catheter (Johnson) change required       cefpodoxime 100 MG tablet    VANTIN    20 tablet    Take 1 tablet (100 mg) by mouth 2 times daily    Kidney transplanted, Urinary tract infection        Cranberry 400 MG Tabs      Take 400 mg by mouth 2 times daily        famotidine 20 MG tablet    PEPCID    60 tablet    Take 1 tablet (20 mg) by mouth 2 times daily        gabapentin 300 MG capsule    NEURONTIN    360 capsule    Take 2 capsules (600 mg) by mouth 2 times daily May take an additional 300mg at bedtime daily with increase pain    Diabetic polyneuropathy associated with type 2 diabetes mellitus (H)       hydrocortisone 1%/eucerin 1% compounded cream     30 g    Apply topically 2 times daily    Rash and nonspecific skin eruption       insulin aspart 100 UNIT/ML pen    NovoLOG FLEXPEN    45 mL    Take 10 units with meals twice daily with sliding scale. Max 40 units per day    Type 2 diabetes mellitus with diabetic polyneuropathy, with long-term current use of insulin (H)       insulin glargine 100 UNIT/ML pen    LANTUS SOLOSTAR    45 mL    Inject 22 Units Subcutaneous At Bedtime    Type 2 diabetes mellitus with diabetic polyneuropathy, with long-term current use of insulin (H)       insulin pen needle 31G X 6 MM miscellaneous    COMFORT EZ PEN NEEDLES    100 each    Use 4 pen needles daily or as directed.    Type 2 diabetes mellitus (H)       labetalol 300 MG tablet    NORMODYNE    120 tablet    Take 0.5 tablets (150 mg) by mouth 2 times daily Take 1/2 tablet (150mg) =300mg. Hold for systolic BP less than 120.    Benign essential hypertension, Kidney replaced by transplant       lactobacillus rhamnosus (GG) capsule     60 capsule    Take 1 capsule by mouth 2 times daily    Diarrhea       levETIRAcetam 750 MG tablet    KEPPRA    60 tablet    Take 1 tablet (750 mg) by mouth 2 times daily        levothyroxine 50 MCG tablet    SYNTHROID/LEVOTHROID    90 tablet    TAKE ONE TABLET BY MOUTH EVERY DAY    Other specified hypothyroidism       lidocaine 2 % external gel    XYLOCAINE    30 mL    Apply topically as needed for moderate pain 10 ML every 3 weeks    Johnson catheter in place       loratadine 10 MG tablet     "CLARITIN    90 tablet    Take 1 tablet (10 mg) by mouth daily    Acute nasopharyngitis       losartan 25 MG tablet    COZAAR    30 tablet    Take 0.5 tablets (12.5 mg) by mouth daily    Kidney replaced by transplant, Persistent proteinuria       ondansetron 4 MG ODT tab    ZOFRAN ODT    20 tablet    Take 1 tablet (4 mg) by mouth daily as needed for nausea 30 minutes before getting up in your wheelchair    Vertigo       order for DME     1 each    Equipment being ordered: [employer] gel-T cushion 20 inches wide & 18 inches deep  height 1.676 m (5' 6\"), weight 101.6 kg (223 lb 15.8 oz)    Back pain       order for DME     1 Box    Equipment being ordered: Tegaderm 4x4  MICHELLE 6 months Sacral wound    Decubitus ulcer of sacral region, unspecified ulcer stage       pravastatin 10 MG tablet    PRAVACHOL    90 tablet    Take 1 tablet (10 mg) by mouth daily    Hyperlipidemia LDL goal <100       predniSONE 5 MG tablet    DELTASONE    90 tablet    Take 1 tablet (5 mg) by mouth daily    Kidney replaced by transplant       simethicone 125 MG chewable tablet    MYLICON    120 tablet    Take 1 tablet (125 mg) by mouth as needed for intestinal gas        tacrolimus 1 MG capsule    GENERIC EQUIVALENT    120 capsule    Take 2 capsules (2 mg) by mouth 2 times daily    Kidney replaced by transplant       vitamin B-12 1000 MCG/ML injection    CYANOCOBALAMIN    3 mL    Inject 1 mL (1,000 mcg) into the muscle every 30 days    Iron deficiency anemia, unspecified iron deficiency anemia type       VITAMIN D (CHOLECALCIFEROL) PO      Take 4,000 Units by mouth 2 times daily          "

## 2018-12-03 ENCOUNTER — DOCUMENTATION ONLY (OUTPATIENT)
Dept: CARE COORDINATION | Facility: CLINIC | Age: 73
End: 2018-12-03

## 2018-12-03 LAB
ANION GAP SERPL CALCULATED.3IONS-SCNC: 7 MMOL/L (ref 3–14)
BASOPHILS # BLD AUTO: 0 10E9/L (ref 0–0.2)
BASOPHILS NFR BLD AUTO: 0.4 %
BUN SERPL-MCNC: 51 MG/DL (ref 7–30)
CALCIUM SERPL-MCNC: 8.3 MG/DL (ref 8.5–10.1)
CHLORIDE SERPL-SCNC: 110 MMOL/L (ref 94–109)
CO2 SERPL-SCNC: 22 MMOL/L (ref 20–32)
CREAT SERPL-MCNC: 1.65 MG/DL (ref 0.52–1.04)
DIFFERENTIAL METHOD BLD: ABNORMAL
EOSINOPHIL # BLD AUTO: 0.3 10E9/L (ref 0–0.7)
EOSINOPHIL NFR BLD AUTO: 5.6 %
ERYTHROCYTE [DISTWIDTH] IN BLOOD BY AUTOMATED COUNT: 14.1 % (ref 10–15)
GFR SERPL CREATININE-BSD FRML MDRD: 31 ML/MIN/1.7M2
GLUCOSE SERPL-MCNC: 162 MG/DL (ref 70–99)
HCT VFR BLD AUTO: 32.5 % (ref 35–47)
HGB BLD-MCNC: 10.3 G/DL (ref 11.7–15.7)
IMM GRANULOCYTES # BLD: 0 10E9/L (ref 0–0.4)
IMM GRANULOCYTES NFR BLD: 0.4 %
LYMPHOCYTES # BLD AUTO: 1.7 10E9/L (ref 0.8–5.3)
LYMPHOCYTES NFR BLD AUTO: 38.7 %
MCH RBC QN AUTO: 29.7 PG (ref 26.5–33)
MCHC RBC AUTO-ENTMCNC: 31.7 G/DL (ref 31.5–36.5)
MCV RBC AUTO: 94 FL (ref 78–100)
MONOCYTES # BLD AUTO: 0.5 10E9/L (ref 0–1.3)
MONOCYTES NFR BLD AUTO: 10.8 %
NEUTROPHILS # BLD AUTO: 2 10E9/L (ref 1.6–8.3)
NEUTROPHILS NFR BLD AUTO: 44.1 %
NRBC # BLD AUTO: 0 10*3/UL
NRBC BLD AUTO-RTO: 0 /100
PLATELET # BLD AUTO: 131 10E9/L (ref 150–450)
POTASSIUM SERPL-SCNC: 4.4 MMOL/L (ref 3.4–5.3)
RBC # BLD AUTO: 3.47 10E12/L (ref 3.8–5.2)
SODIUM SERPL-SCNC: 139 MMOL/L (ref 133–144)
WBC # BLD AUTO: 4.5 10E9/L (ref 4–11)

## 2018-12-03 PROCEDURE — 85025 COMPLETE CBC W/AUTO DIFF WBC: CPT | Performed by: FAMILY MEDICINE

## 2018-12-03 PROCEDURE — 80197 ASSAY OF TACROLIMUS: CPT | Performed by: FAMILY MEDICINE

## 2018-12-03 PROCEDURE — 80048 BASIC METABOLIC PNL TOTAL CA: CPT | Performed by: FAMILY MEDICINE

## 2018-12-04 LAB
TACROLIMUS BLD-MCNC: 3.5 UG/L (ref 5–15)
TME LAST DOSE: ABNORMAL H

## 2018-12-04 NOTE — PROGRESS NOTES
[facility] now requests orders and shares plan of care/discharge summaries for some patients through [employer].  Please REPLY TO THIS MESSAGE OR ROUTE BACK TO THE AUTHOR in order to give authorization for orders when needed.  This is considered a verbal order, you will still receive a faxed copy of orders for signature.  Thank you for your assistance in improving collaboration for our patients.    ORDER    Patient seen today for 60 day recertification assessment with [facility]. Requesting the following Home Care orders to start with new certification period beginning 12/8/2018:    SN visits for every 3 week catheter changes and management, monthly labs and B12 injections as previously ordered.  1 Month 1  2 Month 1  1 Month 1  7 PRN    HHA visits for bathing and brandie care  3 week 8  1 week 1    Thank you,    SEKOU Taylor Case Manager  [facility]  [phone]  [email or web address]

## 2018-12-06 ENCOUNTER — ALLIED HEALTH/NURSE VISIT (OUTPATIENT)
Dept: PHARMACY | Facility: CLINIC | Age: 73
End: 2018-12-06
Payer: COMMERCIAL

## 2018-12-06 DIAGNOSIS — I10 BENIGN ESSENTIAL HYPERTENSION: ICD-10-CM

## 2018-12-06 DIAGNOSIS — E11.42 TYPE 2 DIABETES MELLITUS WITH DIABETIC POLYNEUROPATHY, WITH LONG-TERM CURRENT USE OF INSULIN (H): Primary | ICD-10-CM

## 2018-12-06 DIAGNOSIS — Z79.4 TYPE 2 DIABETES MELLITUS WITH DIABETIC POLYNEUROPATHY, WITH LONG-TERM CURRENT USE OF INSULIN (H): Primary | ICD-10-CM

## 2018-12-06 PROCEDURE — 99606 MTMS BY PHARM EST 15 MIN: CPT | Performed by: PHARMACIST

## 2018-12-06 PROCEDURE — 99607 MTMS BY PHARM ADDL 15 MIN: CPT | Performed by: PHARMACIST

## 2018-12-06 NOTE — PROGRESS NOTES
SUBJECTIVE/OBJECTIVE:                Gem Jade is a 73 year old female called for a follow-up visit for Medication Therapy Management.  She was referred to me from Marivel Barcenas.     Chief Complaint: Follow up from our visit on 18.    Tobacco: History of tobacco dependence- quit  Alcohol: not currently using    Medication Adherence/Access:  Issues found and discussed below   Patient has assistance with medication administration and setting up boxes ( Keshawn).     Hypertension: Currently taking losartan 12.5mg daily, labetalol 150mg once daily (doesn't take BID PRN as directed) PRN only for high systolic BP >150.  No side effects reported. Blood pressures are being checked by HCRN and self - reports systolic usually 125-140s but highly variable. Has been taking BP multiple times a day because anxious about her BP. Using wrist cuff but often checks it against HCRN cuff and seems to be accurate. BP followed by her kidney transplant team.    Diabetes:  Pt currently taking Lantus 22u daily and novolog 10 units + 2-8 units sliding scale with dinner and Novolog sliding scale only with breakfast. Has been missing doses of Novolog at times when waiting for  to administer. Pt is not experiencing side effects.   Sliding scale:   140-239=2 units  240-349=4 units  350-439=6 units  >440=8 units  SMB-3 times daily.   Ranges (pt reported):   Has been able to keep in the 100s usually, however her solution was to stop eating in order to keep her BS down.   Patient is not experiencing hypoglycemia  Recent symptoms of high blood sugar? none  Eye exam: due  Foot exam: due  ACEi/ARB: Losartan  Urine Albumin:   Lab Results   Component Value Date    UMALCR 6776.56 (H) 2017      Aspirin: Taking 81mg daily and denies side effects  Diet/Exercise: No breakfast (stopped Ensure, was giving her diarrhea) and meal for dinner.  Trying to restart eating breakfast.  Lab Results   Component Value Date     A1C 8.2 09/16/2018    A1C 8.8 05/26/2018    A1C 6.3 01/12/2018    A1C 6.7 12/19/2017    A1C 7.7 11/14/2017       Today's Vitals: There were no vitals taken for this visit. - phone visit      ASSESSMENT:              Current medications were reviewed today as discussed above.      Medication Adherence: good, needs improvement - see below    Hypertension: improved. Unnecessary to frequently monitor BP if asymptomatic and contributing to anxiety. Encouraged her to reduce to BID checks unless symptomatic. Pt had been administering labetalol only once daily - will increase to BID PRN as ordered.    Diabetes: improved. Discussed ways to        PLAN:                  1. Check blood pressure only twice a day and administer labetalol BID PRN as directed  2. Try to administer Novolog on her own using magnifier     I spent 40 minutes with this patient today. All changes were made via collaborative practice agreement with Marivel Barcenas. A copy of the visit note was provided to the patient's primary care provider.     Will follow up in 1 month.    The patient declined a summary of these recommendations as an after visit summary.    Conchita Toledo, PharmD, BCACP

## 2018-12-10 NOTE — PATIENT INSTRUCTIONS
Recommendations from today's MTM visit:                                                        1. Check blood pressure twice a day when it's time to take medication. Take labetalol twice a day as needed for systolic pressure over 150    2. Work with Keshawn on giving your own Novolog injections using magnifier and counting clicks. Please have Keshawn double check your doses until you are confident you are able to give it correctly.     Next MTM visit: 1 month    To schedule another MTM appointment, please call the clinic directly or you may call the MTM scheduling line at [phone] or toll-free at [phone].     My Clinical Pharmacist's contact information:                                                      It was a pleasure talking with you today!  Please feel free to contact me with any questions or concerns you have.      Conchita Toledo, PharmD, BCACP     You may receive a survey about the MTM services you received.  I would appreciate your feedback to help me serve you better in the future. Please fill it out and return it when you can. Your comments will be anonymous.

## 2018-12-17 DIAGNOSIS — Z53.9 DIAGNOSIS NOT YET DEFINED: Primary | ICD-10-CM

## 2018-12-17 PROCEDURE — G0179 MD RECERTIFICATION HHA PT: HCPCS | Performed by: FAMILY MEDICINE

## 2018-12-21 ENCOUNTER — TELEPHONE (OUTPATIENT)
Dept: FAMILY MEDICINE | Facility: CLINIC | Age: 73
End: 2018-12-21

## 2018-12-21 ENCOUNTER — CARE COORDINATION (OUTPATIENT)
Dept: NEPHROLOGY | Facility: CLINIC | Age: 73
End: 2018-12-21

## 2018-12-21 DIAGNOSIS — R80.1 PERSISTENT PROTEINURIA: ICD-10-CM

## 2018-12-21 DIAGNOSIS — Z94.0 KIDNEY REPLACED BY TRANSPLANT: ICD-10-CM

## 2018-12-21 NOTE — PROGRESS NOTES
Attempted to call patient to follow up on BP. No answer, no voicemail. Will try again at a later time.    Helena Rogel RN

## 2018-12-26 ENCOUNTER — TELEPHONE (OUTPATIENT)
Dept: FAMILY MEDICINE | Facility: CLINIC | Age: 73
End: 2018-12-26

## 2018-12-26 LAB
ALBUMIN UR-MCNC: 100 MG/DL
APPEARANCE UR: ABNORMAL
BACTERIA #/AREA URNS HPF: ABNORMAL /HPF
BILIRUB UR QL STRIP: NEGATIVE
COLOR UR AUTO: ABNORMAL
GLUCOSE UR STRIP-MCNC: >1000 MG/DL
HGB UR QL STRIP: ABNORMAL
KETONES UR STRIP-MCNC: NEGATIVE MG/DL
LEUKOCYTE ESTERASE UR QL STRIP: ABNORMAL
MUCOUS THREADS #/AREA URNS LPF: PRESENT /LPF
NITRATE UR QL: NEGATIVE
PH UR STRIP: 5.5 PH (ref 5–7)
RBC #/AREA URNS AUTO: 12 /HPF (ref 0–2)
SOURCE: ABNORMAL
SP GR UR STRIP: 1.01 (ref 1–1.03)
UROBILINOGEN UR STRIP-MCNC: NORMAL MG/DL (ref 0–2)
WBC #/AREA URNS AUTO: >182 /HPF (ref 0–5)
WBC CLUMPS #/AREA URNS HPF: PRESENT /HPF
YEAST #/AREA URNS HPF: ABNORMAL /HPF

## 2018-12-26 PROCEDURE — 81001 URINALYSIS AUTO W/SCOPE: CPT | Performed by: FAMILY MEDICINE

## 2018-12-26 PROCEDURE — 87088 URINE BACTERIA CULTURE: CPT | Performed by: FAMILY MEDICINE

## 2018-12-26 PROCEDURE — 87086 URINE CULTURE/COLONY COUNT: CPT | Performed by: FAMILY MEDICINE

## 2018-12-26 PROCEDURE — 87186 SC STD MICRODIL/AGAR DIL: CPT | Performed by: FAMILY MEDICINE

## 2018-12-26 NOTE — TELEPHONE ENCOUNTER
Patient is having symptoms that she typically has when a UTI is starting-- bladder spasms, increased glucose levels and increased amount of sediment in urine. HC nurse calling as she is about to change out patient's catheter and is wanting to take a urine sample to test for UTI. Verbal order given for UA/UC.     Sallie Perales RN  12/26/18  1:44 PM

## 2018-12-27 DIAGNOSIS — N39.0 URINARY TRACT INFECTION: Primary | ICD-10-CM

## 2018-12-27 LAB
BACTERIA SPEC CULT: ABNORMAL
Lab: ABNORMAL
SPECIMEN SOURCE: ABNORMAL

## 2018-12-27 RX ORDER — CEFDINIR 300 MG/1
300 CAPSULE ORAL 2 TIMES DAILY
Qty: 14 CAPSULE | Refills: 0 | Status: SHIPPED | OUTPATIENT
Start: 2018-12-27 | End: 2019-02-12

## 2018-12-27 NOTE — PROGRESS NOTES
Spoke with patient's  about the confirmed UTI and that antibiotics had been ordered.  informed that if there are any worsening of symptoms such as fever, back pain or nausea/vomiting to go to the ER. Also informed  that some GI symptoms might arise due to the antibiotics.  in agreement. Patient requested [facility] pharmacy to be used.     Sallie Perales RN  12/27/18  3:06 PM

## 2018-12-27 NOTE — PROGRESS NOTES
Nephrology Note: Nursing Outreach Encounter    REASON FOR CALL:                                                      REASON FOR CALL: Blood Pressure Follow Up                                          SITUATION/BACKROUND:                                                    Patient is being treated for HTN.    ASSESSMENT:                                                      Called patient to check in. States BP continues to fluctuate. Today was 211/93, yesterday was 158/68. Other readings include: 121/45, 201/92, 107/45, 122/55. She was diagnosed with a UTI, thinks this may be part of it. Denied headaches, dizziness, or swelling. Said her head has felt off, but denied pain.     Currently prescribed losartan 12.5mg daily and labetalol 150mg BID.    Uremic Symptoms: No       PLAN:                                                      Follow Up:   Route to provider to further advise     Reviewed with Dr. Ley and Klaudia Barcenas:    Currently she is taking losartan 12.5mg and Labetalol according to her BP parameters.  We could eliminate the parameters to see if that helps or eliminate her labetalol and increase her losartan to 25mg daily.  She has multiple BP cuffs she can use but the simpler the regime the better has they forget at times to take her BP and then miss her labetalol.  Unclear how accurate her home BP's are but I can try and send out a Community Paramedic to check.     I agree we can stop the labetalol, increase the losartan as mentioned.     Patient verbalized understanding and will contact the clinic with any further questions or concerns.     Helena Rogel RN

## 2018-12-27 NOTE — PROGRESS NOTES
Patient with positive urine culture = complicated UTI given indwelling catheter.  Will treat with omnicef 300mg BID x 7 days.  Go to ER for worsening symptoms (fever, back pain, nausea/vomiting).  Based on CrCl of 45 there is no dose adjustment needed.      Please confirm patient's pharmacy preference.      Stacey Iglesias, DO on 12/27/2018 at 12:06 PM  (covering provider)

## 2018-12-28 NOTE — TELEPHONE ENCOUNTER
----- Message from Helena Rogel RN sent at 12/27/2018  4:21 PM CST -----  Hello,    I just spoke with Shantelle about her blood pressures, and it continues to fluctuate. Today was 211/93, yesterday was 158/68. Other readings include: 121/45, 201/92, 107/45, 122/55. She was diagnosed with a UTI this week, thinks this may be part of it. Denied headaches, dizziness, or swelling. Said her head has felt off, but denied pain.     Currently prescribed losartan 12.5mg daily and labetalol 150mg BID.    She's wondering what more she can do to regulate BP. She's been seeing an MTM pharmacist over the last few months. For whatever reason, Shantelle hasn't gotten her BP under control. Any recommendations?    Thanks,  Helena

## 2018-12-28 NOTE — PLAN OF CARE
Clinic hours for Dr. Cardenas:  Monday 7 am - 5 pm  Tuesday 7 am - 5 pm  Wednesday 7 am - 5 pm  Thursday 7 am - 5 pm  Friday  7 am - 4 pm    If you need a refill on your prescription please call your pharmacy and let them know. Please be proactive and call before your medication runs out. The pharmacy will then contact us for the refill. Please allow 24-48 hours for the refill to be processed.     If your physician has ordered additional laboratory or radiology testing as part of your ongoing plan of care, please allow 7-10 business days from the day of your lab draw or test for the results to be sent and reviewed by your provider. If your results are critical and require more immediate intervention, you will be contacted sooner. Your results will be conveyed to you via a phone call or letter.    You may be receiving a patient satisfaction survey in the mail. Please take the time to complete, as your feedback is very important to us. We strive to make your experience exceptional and your comments help us with that goal. We look forward to hearing from you.       Problem: Patient Care Overview  Goal: Plan of Care/Patient Progress Review  PT/OT: Orders received, chart reviewed. Pt admitted with AMS due to presumed UTI. Pt lives with her spouse, has been bed bound for 6 years and transfers via lift and wheelchair but very rarely gets out of bed. Pt's spouse assists with all ADLs and takes care of all IADLs. Pt has no skilled inpatient PT or OT needs at this time.

## 2018-12-31 DIAGNOSIS — Z53.9 DIAGNOSIS NOT YET DEFINED: Primary | ICD-10-CM

## 2018-12-31 PROCEDURE — G0179 MD RECERTIFICATION HHA PT: HCPCS | Performed by: FAMILY MEDICINE

## 2019-01-01 ENCOUNTER — TELEPHONE (OUTPATIENT)
Dept: FAMILY MEDICINE | Facility: CLINIC | Age: 74
End: 2019-01-01

## 2019-01-01 ENCOUNTER — HOME INFUSION (PRE-WILLOW HOME INFUSION) (OUTPATIENT)
Dept: PHARMACY | Facility: CLINIC | Age: 74
End: 2019-01-01

## 2019-01-01 ENCOUNTER — ALLIED HEALTH/NURSE VISIT (OUTPATIENT)
Dept: PHARMACY | Facility: CLINIC | Age: 74
End: 2019-01-01
Payer: COMMERCIAL

## 2019-01-01 ENCOUNTER — APPOINTMENT (OUTPATIENT)
Dept: SPEECH THERAPY | Facility: CLINIC | Age: 74
DRG: 698 | End: 2019-01-01
Payer: MEDICARE

## 2019-01-01 ENCOUNTER — OFFICE VISIT (OUTPATIENT)
Dept: FAMILY MEDICINE | Facility: CLINIC | Age: 74
End: 2019-01-01
Payer: MEDICARE

## 2019-01-01 ENCOUNTER — TELEPHONE (OUTPATIENT)
Dept: TRANSPLANT | Facility: CLINIC | Age: 74
End: 2019-01-01

## 2019-01-01 ENCOUNTER — MEDICAL CORRESPONDENCE (OUTPATIENT)
Dept: HEALTH INFORMATION MANAGEMENT | Facility: CLINIC | Age: 74
End: 2019-01-01

## 2019-01-01 ENCOUNTER — HOSPITAL ENCOUNTER (OUTPATIENT)
Facility: CLINIC | Age: 74
Discharge: HOME | End: 2019-11-15
Attending: INTERNAL MEDICINE | Admitting: INTERNAL MEDICINE
Payer: MEDICARE

## 2019-01-01 ENCOUNTER — VIRTUAL VISIT (OUTPATIENT)
Dept: FAMILY MEDICINE | Facility: CLINIC | Age: 74
End: 2019-01-01
Payer: MEDICARE

## 2019-01-01 ENCOUNTER — PATIENT OUTREACH (OUTPATIENT)
Dept: CARE COORDINATION | Facility: CLINIC | Age: 74
End: 2019-01-01

## 2019-01-01 ENCOUNTER — DOCUMENTATION ONLY (OUTPATIENT)
Dept: FAMILY MEDICINE | Facility: CLINIC | Age: 74
End: 2019-01-01

## 2019-01-01 ENCOUNTER — APPOINTMENT (OUTPATIENT)
Dept: GENERAL RADIOLOGY | Facility: CLINIC | Age: 74
DRG: 698 | End: 2019-01-01
Attending: HOSPITALIST
Payer: MEDICARE

## 2019-01-01 ENCOUNTER — APPOINTMENT (OUTPATIENT)
Dept: CT IMAGING | Facility: CLINIC | Age: 74
DRG: 698 | End: 2019-01-01
Attending: INTERNAL MEDICINE
Payer: MEDICARE

## 2019-01-01 ENCOUNTER — APPOINTMENT (OUTPATIENT)
Dept: SPEECH THERAPY | Facility: CLINIC | Age: 74
DRG: 698 | End: 2019-01-01
Attending: HOSPITALIST
Payer: MEDICARE

## 2019-01-01 ENCOUNTER — APPOINTMENT (OUTPATIENT)
Dept: INTERVENTIONAL RADIOLOGY/VASCULAR | Facility: CLINIC | Age: 74
DRG: 698 | End: 2019-01-01
Attending: EMERGENCY MEDICINE
Payer: MEDICARE

## 2019-01-01 ENCOUNTER — APPOINTMENT (OUTPATIENT)
Dept: FAMILY MEDICINE | Facility: CLINIC | Age: 74
End: 2019-01-01
Payer: MEDICARE

## 2019-01-01 ENCOUNTER — DOCUMENTATION ONLY (OUTPATIENT)
Dept: CARE COORDINATION | Facility: CLINIC | Age: 74
End: 2019-01-01

## 2019-01-01 ENCOUNTER — HEALTH MAINTENANCE LETTER (OUTPATIENT)
Age: 74
End: 2019-01-01

## 2019-01-01 ENCOUNTER — HOSPITAL ENCOUNTER (INPATIENT)
Facility: CLINIC | Age: 74
LOS: 7 days | Discharge: HOME HEALTH | DRG: 698 | End: 2019-11-26
Attending: EMERGENCY MEDICINE | Admitting: INTERNAL MEDICINE
Payer: MEDICARE

## 2019-01-01 ENCOUNTER — APPOINTMENT (OUTPATIENT)
Dept: LAB | Facility: CLINIC | Age: 74
End: 2019-01-01
Attending: INTERNAL MEDICINE
Payer: MEDICARE

## 2019-01-01 ENCOUNTER — HOSPITAL ENCOUNTER (OUTPATIENT)
Facility: CLINIC | Age: 74
Discharge: STILL ADMITTED OR ADMINISTRATIVE | DRG: 698 | End: 2019-11-19
Attending: INTERNAL MEDICINE | Admitting: INTERNAL MEDICINE
Payer: MEDICARE

## 2019-01-01 ENCOUNTER — HOSPITAL ENCOUNTER (INPATIENT)
Facility: CLINIC | Age: 74
LOS: 8 days | Discharge: HOME HEALTH | DRG: 698 | End: 2019-11-08
Attending: EMERGENCY MEDICINE | Admitting: INTERNAL MEDICINE
Payer: MEDICARE

## 2019-01-01 ENCOUNTER — DOCUMENTATION ONLY (OUTPATIENT)
Dept: PODIATRY | Facility: CLINIC | Age: 74
End: 2019-01-01

## 2019-01-01 VITALS — RESPIRATION RATE: 12 BRPM | OXYGEN SATURATION: 97 %

## 2019-01-01 VITALS
DIASTOLIC BLOOD PRESSURE: 30 MMHG | SYSTOLIC BLOOD PRESSURE: 141 MMHG | OXYGEN SATURATION: 95 % | TEMPERATURE: 95.6 F | HEART RATE: 63 BPM | RESPIRATION RATE: 16 BRPM

## 2019-01-01 VITALS
RESPIRATION RATE: 18 BRPM | HEART RATE: 94 BPM | OXYGEN SATURATION: 94 % | BODY MASS INDEX: 38.49 KG/M2 | WEIGHT: 239.5 LBS | TEMPERATURE: 98 F | DIASTOLIC BLOOD PRESSURE: 56 MMHG | SYSTOLIC BLOOD PRESSURE: 113 MMHG | HEIGHT: 66 IN

## 2019-01-01 VITALS
WEIGHT: 229 LBS | OXYGEN SATURATION: 99 % | BODY MASS INDEX: 35.94 KG/M2 | DIASTOLIC BLOOD PRESSURE: 60 MMHG | SYSTOLIC BLOOD PRESSURE: 120 MMHG | HEIGHT: 67 IN | HEART RATE: 67 BPM

## 2019-01-01 VITALS
OXYGEN SATURATION: 97 % | BODY MASS INDEX: 33.63 KG/M2 | HEIGHT: 69 IN | DIASTOLIC BLOOD PRESSURE: 54 MMHG | RESPIRATION RATE: 18 BRPM | HEART RATE: 68 BPM | SYSTOLIC BLOOD PRESSURE: 149 MMHG | WEIGHT: 227.07 LBS | TEMPERATURE: 99.4 F

## 2019-01-01 VITALS — SYSTOLIC BLOOD PRESSURE: 156 MMHG | DIASTOLIC BLOOD PRESSURE: 70 MMHG | OXYGEN SATURATION: 97 % | HEART RATE: 68 BPM

## 2019-01-01 DIAGNOSIS — Z46.6 URINARY CATHETER (FOLEY) CHANGE REQUIRED: ICD-10-CM

## 2019-01-01 DIAGNOSIS — E11.42 DIABETIC POLYNEUROPATHY ASSOCIATED WITH TYPE 2 DIABETES MELLITUS (H): Primary | ICD-10-CM

## 2019-01-01 DIAGNOSIS — Z09 HOSPITAL DISCHARGE FOLLOW-UP: Primary | ICD-10-CM

## 2019-01-01 DIAGNOSIS — E11.42 DIABETIC POLYNEUROPATHY ASSOCIATED WITH TYPE 2 DIABETES MELLITUS (H): ICD-10-CM

## 2019-01-01 DIAGNOSIS — E66.812 CLASS 2 OBESITY WITHOUT SERIOUS COMORBIDITY WITH BODY MASS INDEX (BMI) OF 35.0 TO 35.9 IN ADULT, UNSPECIFIED OBESITY TYPE: ICD-10-CM

## 2019-01-01 DIAGNOSIS — N39.0 RECURRENT UTI: Primary | ICD-10-CM

## 2019-01-01 DIAGNOSIS — I10 BENIGN ESSENTIAL HYPERTENSION: ICD-10-CM

## 2019-01-01 DIAGNOSIS — Z00.00 ROUTINE GENERAL MEDICAL EXAMINATION AT A HEALTH CARE FACILITY: Primary | ICD-10-CM

## 2019-01-01 DIAGNOSIS — Z94.0 KIDNEY REPLACED BY TRANSPLANT: Primary | ICD-10-CM

## 2019-01-01 DIAGNOSIS — R53.81 PHYSICAL DECONDITIONING: ICD-10-CM

## 2019-01-01 DIAGNOSIS — Z97.8 FOLEY CATHETER IN PLACE: ICD-10-CM

## 2019-01-01 DIAGNOSIS — Z74.01 BEDBOUND: ICD-10-CM

## 2019-01-01 DIAGNOSIS — N18.30 CKD (CHRONIC KIDNEY DISEASE) STAGE 3, GFR 30-59 ML/MIN (H): ICD-10-CM

## 2019-01-01 DIAGNOSIS — R41.82 ALTERED MENTAL STATUS: ICD-10-CM

## 2019-01-01 DIAGNOSIS — Z79.4 TYPE 2 DIABETES MELLITUS WITH HYPERGLYCEMIA, WITH LONG-TERM CURRENT USE OF INSULIN (H): Primary | ICD-10-CM

## 2019-01-01 DIAGNOSIS — E11.42 TYPE 2 DIABETES MELLITUS WITH DIABETIC POLYNEUROPATHY, WITH LONG-TERM CURRENT USE OF INSULIN (H): ICD-10-CM

## 2019-01-01 DIAGNOSIS — R41.82 ALTERED MENTAL STATUS: Primary | ICD-10-CM

## 2019-01-01 DIAGNOSIS — N32.89 BLADDER SPASM: ICD-10-CM

## 2019-01-01 DIAGNOSIS — N39.0 RECURRENT UTI: ICD-10-CM

## 2019-01-01 DIAGNOSIS — Z79.4 TYPE 2 DIABETES MELLITUS WITH DIABETIC POLYNEUROPATHY, WITH LONG-TERM CURRENT USE OF INSULIN (H): ICD-10-CM

## 2019-01-01 DIAGNOSIS — T83.511A URINARY TRACT INFECTION ASSOCIATED WITH INDWELLING URETHRAL CATHETER, INITIAL ENCOUNTER (H): ICD-10-CM

## 2019-01-01 DIAGNOSIS — F51.5 NIGHTMARES: ICD-10-CM

## 2019-01-01 DIAGNOSIS — Z79.4 TYPE 2 DIABETES MELLITUS WITH HYPERGLYCEMIA, WITH LONG-TERM CURRENT USE OF INSULIN (H): ICD-10-CM

## 2019-01-01 DIAGNOSIS — R78.81 BACTEREMIA: Primary | ICD-10-CM

## 2019-01-01 DIAGNOSIS — R42 VERTIGO: ICD-10-CM

## 2019-01-01 DIAGNOSIS — Z94.0 KIDNEY REPLACED BY TRANSPLANT: ICD-10-CM

## 2019-01-01 DIAGNOSIS — Z94.0 KIDNEY TRANSPLANTED: ICD-10-CM

## 2019-01-01 DIAGNOSIS — K52.9 CHRONIC DIARRHEA: ICD-10-CM

## 2019-01-01 DIAGNOSIS — M54.9 BACK PAIN, UNSPECIFIED BACK LOCATION, UNSPECIFIED BACK PAIN LATERALITY, UNSPECIFIED CHRONICITY: ICD-10-CM

## 2019-01-01 DIAGNOSIS — Z53.9 DIAGNOSIS NOT YET DEFINED: Primary | ICD-10-CM

## 2019-01-01 DIAGNOSIS — Z48.22 ENCOUNTER FOR AFTERCARE FOLLOWING KIDNEY TRANSPLANT: ICD-10-CM

## 2019-01-01 DIAGNOSIS — E11.65 TYPE 2 DIABETES MELLITUS WITH HYPERGLYCEMIA, WITH LONG-TERM CURRENT USE OF INSULIN (H): Primary | ICD-10-CM

## 2019-01-01 DIAGNOSIS — E11.65 TYPE 2 DIABETES MELLITUS WITH HYPERGLYCEMIA, WITH LONG-TERM CURRENT USE OF INSULIN (H): ICD-10-CM

## 2019-01-01 DIAGNOSIS — N39.0 URINARY TRACT INFECTION ASSOCIATED WITH INDWELLING URETHRAL CATHETER, INITIAL ENCOUNTER (H): ICD-10-CM

## 2019-01-01 DIAGNOSIS — D64.9 ANEMIA: Primary | ICD-10-CM

## 2019-01-01 DIAGNOSIS — N18.30 CKD (CHRONIC KIDNEY DISEASE) STAGE 3, GFR 30-59 ML/MIN (H): Primary | ICD-10-CM

## 2019-01-01 DIAGNOSIS — N39.0 COMPLICATED UTI (URINARY TRACT INFECTION): ICD-10-CM

## 2019-01-01 DIAGNOSIS — E55.9 VITAMIN D DEFICIENCY: ICD-10-CM

## 2019-01-01 DIAGNOSIS — N39.0 COMPLICATED UTI (URINARY TRACT INFECTION): Primary | ICD-10-CM

## 2019-01-01 DIAGNOSIS — Z97.8 CHRONIC INDWELLING FOLEY CATHETER: ICD-10-CM

## 2019-01-01 DIAGNOSIS — G93.40 ENCEPHALOPATHY: ICD-10-CM

## 2019-01-01 DIAGNOSIS — E03.8 OTHER SPECIFIED HYPOTHYROIDISM: ICD-10-CM

## 2019-01-01 DIAGNOSIS — N39.0 URINARY TRACT INFECTION: ICD-10-CM

## 2019-01-01 LAB
ALBUMIN SERPL-MCNC: 1.9 G/DL (ref 3.4–5)
ALBUMIN SERPL-MCNC: 2 G/DL (ref 3.4–5)
ALBUMIN SERPL-MCNC: 3 G/DL (ref 3.4–5)
ALBUMIN SERPL-MCNC: 3.2 G/DL (ref 3.4–5)
ALBUMIN UR-MCNC: 100 MG/DL
ALBUMIN UR-MCNC: 300 MG/DL
ALBUMIN UR-MCNC: 300 MG/DL
ALP SERPL-CCNC: 49 U/L (ref 40–150)
ALP SERPL-CCNC: 69 U/L (ref 40–150)
ALP SERPL-CCNC: 89 U/L (ref 40–150)
ALT SERPL W P-5'-P-CCNC: 16 U/L (ref 0–50)
ALT SERPL W P-5'-P-CCNC: <6 U/L (ref 0–50)
ALT SERPL W P-5'-P-CCNC: <6 U/L (ref 0–50)
AMORPH CRY #/AREA URNS HPF: ABNORMAL /HPF
ANION GAP SERPL CALCULATED.3IONS-SCNC: 10 MMOL/L (ref 3–14)
ANION GAP SERPL CALCULATED.3IONS-SCNC: 3 MMOL/L (ref 3–14)
ANION GAP SERPL CALCULATED.3IONS-SCNC: 3 MMOL/L (ref 3–14)
ANION GAP SERPL CALCULATED.3IONS-SCNC: 4 MMOL/L (ref 3–14)
ANION GAP SERPL CALCULATED.3IONS-SCNC: 5 MMOL/L (ref 3–14)
ANION GAP SERPL CALCULATED.3IONS-SCNC: 6 MMOL/L (ref 3–14)
ANION GAP SERPL CALCULATED.3IONS-SCNC: 7 MMOL/L (ref 3–14)
ANION GAP SERPL CALCULATED.3IONS-SCNC: 8 MMOL/L (ref 3–14)
ANION GAP SERPL CALCULATED.3IONS-SCNC: 9 MMOL/L (ref 3–14)
APPEARANCE UR: ABNORMAL
AST SERPL W P-5'-P-CCNC: 10 U/L (ref 0–45)
AST SERPL W P-5'-P-CCNC: 12 U/L (ref 0–45)
AST SERPL W P-5'-P-CCNC: 6 U/L (ref 0–45)
BACTERIA #/AREA URNS HPF: ABNORMAL /HPF
BACTERIA #/AREA URNS HPF: ABNORMAL /HPF
BACTERIA SPEC CULT: ABNORMAL
BACTERIA SPEC CULT: NO GROWTH
BACTERIA SPEC CULT: NORMAL
BASE DEFICIT BLDV-SCNC: 10.6 MMOL/L
BASOPHILS # BLD AUTO: 0 10E9/L (ref 0–0.2)
BASOPHILS NFR BLD AUTO: 0.1 %
BASOPHILS NFR BLD AUTO: 0.1 %
BASOPHILS NFR BLD AUTO: 0.2 %
BASOPHILS NFR BLD AUTO: 0.3 %
BILIRUB DIRECT SERPL-MCNC: <0.1 MG/DL (ref 0–0.2)
BILIRUB SERPL-MCNC: 0.2 MG/DL (ref 0.2–1.3)
BILIRUB SERPL-MCNC: 0.5 MG/DL (ref 0.2–1.3)
BILIRUB SERPL-MCNC: <0.1 MG/DL (ref 0.2–1.3)
BILIRUB UR QL STRIP: NEGATIVE
BUN SERPL-MCNC: 37 MG/DL (ref 7–30)
BUN SERPL-MCNC: 40 MG/DL (ref 7–30)
BUN SERPL-MCNC: 43 MG/DL (ref 7–30)
BUN SERPL-MCNC: 44 MG/DL (ref 7–30)
BUN SERPL-MCNC: 45 MG/DL (ref 7–30)
BUN SERPL-MCNC: 46 MG/DL (ref 7–30)
BUN SERPL-MCNC: 47 MG/DL (ref 7–30)
BUN SERPL-MCNC: 47 MG/DL (ref 7–30)
BUN SERPL-MCNC: 48 MG/DL (ref 7–30)
BUN SERPL-MCNC: 49 MG/DL (ref 7–30)
BUN SERPL-MCNC: 50 MG/DL (ref 7–30)
BUN SERPL-MCNC: 56 MG/DL (ref 7–30)
BUN SERPL-MCNC: 58 MG/DL (ref 7–30)
BUN SERPL-MCNC: 60 MG/DL (ref 7–30)
BUN SERPL-MCNC: 65 MG/DL (ref 7–30)
CALCIUM SERPL-MCNC: 7.7 MG/DL (ref 8.5–10.1)
CALCIUM SERPL-MCNC: 7.7 MG/DL (ref 8.5–10.1)
CALCIUM SERPL-MCNC: 7.8 MG/DL (ref 8.5–10.1)
CALCIUM SERPL-MCNC: 7.8 MG/DL (ref 8.5–10.1)
CALCIUM SERPL-MCNC: 7.9 MG/DL (ref 8.5–10.1)
CALCIUM SERPL-MCNC: 7.9 MG/DL (ref 8.5–10.1)
CALCIUM SERPL-MCNC: 8 MG/DL (ref 8.5–10.1)
CALCIUM SERPL-MCNC: 8 MG/DL (ref 8.5–10.1)
CALCIUM SERPL-MCNC: 8.3 MG/DL (ref 8.5–10.1)
CALCIUM SERPL-MCNC: 8.3 MG/DL (ref 8.5–10.1)
CALCIUM SERPL-MCNC: 8.4 MG/DL (ref 8.5–10.1)
CALCIUM SERPL-MCNC: 8.4 MG/DL (ref 8.5–10.1)
CALCIUM SERPL-MCNC: 8.5 MG/DL (ref 8.5–10.1)
CALCIUM SERPL-MCNC: 8.6 MG/DL (ref 8.5–10.1)
CALCIUM SERPL-MCNC: 8.7 MG/DL (ref 8.5–10.1)
CALCIUM SERPL-MCNC: 8.9 MG/DL (ref 8.5–10.1)
CALCIUM SERPL-MCNC: 9.1 MG/DL (ref 8.5–10.1)
CHLORIDE SERPL-SCNC: 105 MMOL/L (ref 94–109)
CHLORIDE SERPL-SCNC: 107 MMOL/L (ref 94–109)
CHLORIDE SERPL-SCNC: 109 MMOL/L (ref 94–109)
CHLORIDE SERPL-SCNC: 110 MMOL/L (ref 94–109)
CHLORIDE SERPL-SCNC: 110 MMOL/L (ref 94–109)
CHLORIDE SERPL-SCNC: 111 MMOL/L (ref 94–109)
CHLORIDE SERPL-SCNC: 112 MMOL/L (ref 94–109)
CHLORIDE SERPL-SCNC: 113 MMOL/L (ref 94–109)
CHLORIDE SERPL-SCNC: 114 MMOL/L (ref 94–109)
CHLORIDE SERPL-SCNC: 117 MMOL/L (ref 94–109)
CK SERPL-CCNC: 76 U/L (ref 30–225)
CO2 BLDCOV-SCNC: 20 MMOL/L (ref 21–28)
CO2 SERPL-SCNC: 16 MMOL/L (ref 20–32)
CO2 SERPL-SCNC: 16 MMOL/L (ref 20–32)
CO2 SERPL-SCNC: 18 MMOL/L (ref 20–32)
CO2 SERPL-SCNC: 19 MMOL/L (ref 20–32)
CO2 SERPL-SCNC: 19 MMOL/L (ref 20–32)
CO2 SERPL-SCNC: 20 MMOL/L (ref 20–32)
CO2 SERPL-SCNC: 21 MMOL/L (ref 20–32)
CO2 SERPL-SCNC: 21 MMOL/L (ref 20–32)
CO2 SERPL-SCNC: 22 MMOL/L (ref 20–32)
CO2 SERPL-SCNC: 22 MMOL/L (ref 20–32)
CO2 SERPL-SCNC: 23 MMOL/L (ref 20–32)
CO2 SERPL-SCNC: 24 MMOL/L (ref 20–32)
CO2 SERPL-SCNC: 24 MMOL/L (ref 20–32)
CO2 SERPL-SCNC: 26 MMOL/L (ref 20–32)
CO2 SERPL-SCNC: 27 MMOL/L (ref 20–32)
CO2 SERPL-SCNC: 27 MMOL/L (ref 20–32)
CO2 SERPL-SCNC: 28 MMOL/L (ref 20–32)
COLOR UR AUTO: ABNORMAL
COLOR UR AUTO: ABNORMAL
COLOR UR AUTO: YELLOW
CREAT SERPL-MCNC: 2.16 MG/DL (ref 0.52–1.04)
CREAT SERPL-MCNC: 2.23 MG/DL (ref 0.52–1.04)
CREAT SERPL-MCNC: 2.27 MG/DL (ref 0.52–1.04)
CREAT SERPL-MCNC: 2.34 MG/DL (ref 0.52–1.04)
CREAT SERPL-MCNC: 2.39 MG/DL (ref 0.52–1.04)
CREAT SERPL-MCNC: 2.43 MG/DL (ref 0.52–1.04)
CREAT SERPL-MCNC: 2.43 MG/DL (ref 0.52–1.04)
CREAT SERPL-MCNC: 2.5 MG/DL (ref 0.52–1.04)
CREAT SERPL-MCNC: 2.6 MG/DL (ref 0.52–1.04)
CREAT SERPL-MCNC: 2.69 MG/DL (ref 0.52–1.04)
CREAT SERPL-MCNC: 2.74 MG/DL (ref 0.52–1.04)
CREAT SERPL-MCNC: 2.76 MG/DL (ref 0.52–1.04)
CREAT SERPL-MCNC: 2.9 MG/DL (ref 0.52–1.04)
CREAT SERPL-MCNC: 3.3 MG/DL (ref 0.52–1.04)
CREAT SERPL-MCNC: 3.33 MG/DL (ref 0.52–1.04)
CREAT SERPL-MCNC: 3.36 MG/DL (ref 0.52–1.04)
CREAT SERPL-MCNC: 3.37 MG/DL (ref 0.52–1.04)
CREAT SERPL-MCNC: 3.46 MG/DL (ref 0.52–1.04)
CREAT SERPL-MCNC: 3.56 MG/DL (ref 0.52–1.04)
CREAT SERPL-MCNC: 3.73 MG/DL (ref 0.52–1.04)
CREAT SERPL-MCNC: 3.88 MG/DL (ref 0.52–1.04)
CRP SERPL-MCNC: <2.9 MG/L (ref 0–8)
DIFFERENTIAL METHOD BLD: ABNORMAL
EOSINOPHIL # BLD AUTO: 0.1 10E9/L (ref 0–0.7)
EOSINOPHIL # BLD AUTO: 0.2 10E9/L (ref 0–0.7)
EOSINOPHIL NFR BLD AUTO: 1.1 %
EOSINOPHIL NFR BLD AUTO: 2.1 %
EOSINOPHIL NFR BLD AUTO: 2.4 %
EOSINOPHIL NFR BLD AUTO: 2.4 %
EOSINOPHIL NFR BLD AUTO: 2.8 %
EOSINOPHIL NFR BLD AUTO: 3.1 %
ERYTHROCYTE [DISTWIDTH] IN BLOOD BY AUTOMATED COUNT: 14.5 % (ref 10–15)
ERYTHROCYTE [DISTWIDTH] IN BLOOD BY AUTOMATED COUNT: 14.6 % (ref 10–15)
ERYTHROCYTE [DISTWIDTH] IN BLOOD BY AUTOMATED COUNT: 14.7 % (ref 10–15)
ERYTHROCYTE [DISTWIDTH] IN BLOOD BY AUTOMATED COUNT: 14.9 % (ref 10–15)
ERYTHROCYTE [DISTWIDTH] IN BLOOD BY AUTOMATED COUNT: 14.9 % (ref 10–15)
ERYTHROCYTE [DISTWIDTH] IN BLOOD BY AUTOMATED COUNT: 15.2 % (ref 10–15)
ERYTHROCYTE [DISTWIDTH] IN BLOOD BY AUTOMATED COUNT: 15.2 % (ref 10–15)
ERYTHROCYTE [DISTWIDTH] IN BLOOD BY AUTOMATED COUNT: 15.3 % (ref 10–15)
ERYTHROCYTE [DISTWIDTH] IN BLOOD BY AUTOMATED COUNT: 15.4 % (ref 10–15)
ERYTHROCYTE [DISTWIDTH] IN BLOOD BY AUTOMATED COUNT: 15.4 % (ref 10–15)
ERYTHROCYTE [DISTWIDTH] IN BLOOD BY AUTOMATED COUNT: 15.5 % (ref 10–15)
FERRITIN SERPL-MCNC: 156 NG/ML (ref 8–252)
FOLATE SERPL-MCNC: 8 NG/ML
GFR SERPL CREATININE-BSD FRML MDRD: 11 ML/MIN/{1.73_M2}
GFR SERPL CREATININE-BSD FRML MDRD: 11 ML/MIN/{1.73_M2}
GFR SERPL CREATININE-BSD FRML MDRD: 12 ML/MIN/{1.73_M2}
GFR SERPL CREATININE-BSD FRML MDRD: 12 ML/MIN/{1.73_M2}
GFR SERPL CREATININE-BSD FRML MDRD: 13 ML/MIN/{1.73_M2}
GFR SERPL CREATININE-BSD FRML MDRD: 15 ML/MIN/{1.73_M2}
GFR SERPL CREATININE-BSD FRML MDRD: 16 ML/MIN/{1.73_M2}
GFR SERPL CREATININE-BSD FRML MDRD: 17 ML/MIN/{1.73_M2}
GFR SERPL CREATININE-BSD FRML MDRD: 17 ML/MIN/{1.73_M2}
GFR SERPL CREATININE-BSD FRML MDRD: 18 ML/MIN/{1.73_M2}
GFR SERPL CREATININE-BSD FRML MDRD: 18 ML/MIN/{1.73_M2}
GFR SERPL CREATININE-BSD FRML MDRD: 19 ML/MIN/{1.73_M2}
GFR SERPL CREATININE-BSD FRML MDRD: 20 ML/MIN/{1.73_M2}
GFR SERPL CREATININE-BSD FRML MDRD: 21 ML/MIN/{1.73_M2}
GFR SERPL CREATININE-BSD FRML MDRD: 21 ML/MIN/{1.73_M2}
GFR SERPL CREATININE-BSD FRML MDRD: 22 ML/MIN/{1.73_M2}
GLUCOSE BLDC GLUCOMTR-MCNC: 103 MG/DL (ref 70–99)
GLUCOSE BLDC GLUCOMTR-MCNC: 109 MG/DL (ref 70–99)
GLUCOSE BLDC GLUCOMTR-MCNC: 109 MG/DL (ref 70–99)
GLUCOSE BLDC GLUCOMTR-MCNC: 113 MG/DL (ref 70–99)
GLUCOSE BLDC GLUCOMTR-MCNC: 116 MG/DL (ref 70–99)
GLUCOSE BLDC GLUCOMTR-MCNC: 117 MG/DL (ref 70–99)
GLUCOSE BLDC GLUCOMTR-MCNC: 118 MG/DL (ref 70–99)
GLUCOSE BLDC GLUCOMTR-MCNC: 120 MG/DL (ref 70–99)
GLUCOSE BLDC GLUCOMTR-MCNC: 122 MG/DL (ref 70–99)
GLUCOSE BLDC GLUCOMTR-MCNC: 127 MG/DL (ref 70–99)
GLUCOSE BLDC GLUCOMTR-MCNC: 127 MG/DL (ref 70–99)
GLUCOSE BLDC GLUCOMTR-MCNC: 128 MG/DL (ref 70–99)
GLUCOSE BLDC GLUCOMTR-MCNC: 133 MG/DL (ref 70–99)
GLUCOSE BLDC GLUCOMTR-MCNC: 135 MG/DL (ref 70–99)
GLUCOSE BLDC GLUCOMTR-MCNC: 141 MG/DL (ref 70–99)
GLUCOSE BLDC GLUCOMTR-MCNC: 142 MG/DL (ref 70–99)
GLUCOSE BLDC GLUCOMTR-MCNC: 143 MG/DL (ref 70–99)
GLUCOSE BLDC GLUCOMTR-MCNC: 146 MG/DL (ref 70–99)
GLUCOSE BLDC GLUCOMTR-MCNC: 149 MG/DL (ref 70–99)
GLUCOSE BLDC GLUCOMTR-MCNC: 150 MG/DL (ref 70–99)
GLUCOSE BLDC GLUCOMTR-MCNC: 153 MG/DL (ref 70–99)
GLUCOSE BLDC GLUCOMTR-MCNC: 154 MG/DL (ref 70–99)
GLUCOSE BLDC GLUCOMTR-MCNC: 161 MG/DL (ref 70–99)
GLUCOSE BLDC GLUCOMTR-MCNC: 162 MG/DL (ref 70–99)
GLUCOSE BLDC GLUCOMTR-MCNC: 163 MG/DL (ref 70–99)
GLUCOSE BLDC GLUCOMTR-MCNC: 166 MG/DL (ref 70–99)
GLUCOSE BLDC GLUCOMTR-MCNC: 167 MG/DL (ref 70–99)
GLUCOSE BLDC GLUCOMTR-MCNC: 169 MG/DL (ref 70–99)
GLUCOSE BLDC GLUCOMTR-MCNC: 170 MG/DL (ref 70–99)
GLUCOSE BLDC GLUCOMTR-MCNC: 172 MG/DL (ref 70–99)
GLUCOSE BLDC GLUCOMTR-MCNC: 173 MG/DL (ref 70–99)
GLUCOSE BLDC GLUCOMTR-MCNC: 175 MG/DL (ref 70–99)
GLUCOSE BLDC GLUCOMTR-MCNC: 180 MG/DL (ref 70–99)
GLUCOSE BLDC GLUCOMTR-MCNC: 181 MG/DL (ref 70–99)
GLUCOSE BLDC GLUCOMTR-MCNC: 188 MG/DL (ref 70–99)
GLUCOSE BLDC GLUCOMTR-MCNC: 189 MG/DL (ref 70–99)
GLUCOSE BLDC GLUCOMTR-MCNC: 193 MG/DL (ref 70–99)
GLUCOSE BLDC GLUCOMTR-MCNC: 193 MG/DL (ref 70–99)
GLUCOSE BLDC GLUCOMTR-MCNC: 194 MG/DL (ref 70–99)
GLUCOSE BLDC GLUCOMTR-MCNC: 195 MG/DL (ref 70–99)
GLUCOSE BLDC GLUCOMTR-MCNC: 196 MG/DL (ref 70–99)
GLUCOSE BLDC GLUCOMTR-MCNC: 198 MG/DL (ref 70–99)
GLUCOSE BLDC GLUCOMTR-MCNC: 204 MG/DL (ref 70–99)
GLUCOSE BLDC GLUCOMTR-MCNC: 206 MG/DL (ref 70–99)
GLUCOSE BLDC GLUCOMTR-MCNC: 206 MG/DL (ref 70–99)
GLUCOSE BLDC GLUCOMTR-MCNC: 213 MG/DL (ref 70–99)
GLUCOSE BLDC GLUCOMTR-MCNC: 220 MG/DL (ref 70–99)
GLUCOSE BLDC GLUCOMTR-MCNC: 226 MG/DL (ref 70–99)
GLUCOSE BLDC GLUCOMTR-MCNC: 230 MG/DL (ref 70–99)
GLUCOSE BLDC GLUCOMTR-MCNC: 236 MG/DL (ref 70–99)
GLUCOSE BLDC GLUCOMTR-MCNC: 245 MG/DL (ref 70–99)
GLUCOSE BLDC GLUCOMTR-MCNC: 249 MG/DL (ref 70–99)
GLUCOSE BLDC GLUCOMTR-MCNC: 255 MG/DL (ref 70–99)
GLUCOSE BLDC GLUCOMTR-MCNC: 261 MG/DL (ref 70–99)
GLUCOSE BLDC GLUCOMTR-MCNC: 275 MG/DL (ref 70–99)
GLUCOSE BLDC GLUCOMTR-MCNC: 277 MG/DL (ref 70–99)
GLUCOSE BLDC GLUCOMTR-MCNC: 279 MG/DL (ref 70–99)
GLUCOSE BLDC GLUCOMTR-MCNC: 280 MG/DL (ref 70–99)
GLUCOSE BLDC GLUCOMTR-MCNC: 290 MG/DL (ref 70–99)
GLUCOSE BLDC GLUCOMTR-MCNC: 296 MG/DL (ref 70–99)
GLUCOSE BLDC GLUCOMTR-MCNC: 298 MG/DL (ref 70–99)
GLUCOSE BLDC GLUCOMTR-MCNC: 308 MG/DL (ref 70–99)
GLUCOSE BLDC GLUCOMTR-MCNC: 328 MG/DL (ref 70–99)
GLUCOSE BLDC GLUCOMTR-MCNC: 77 MG/DL (ref 70–99)
GLUCOSE BLDC GLUCOMTR-MCNC: 78 MG/DL (ref 70–99)
GLUCOSE BLDC GLUCOMTR-MCNC: 87 MG/DL (ref 70–99)
GLUCOSE BLDC GLUCOMTR-MCNC: 94 MG/DL (ref 70–99)
GLUCOSE BLDC GLUCOMTR-MCNC: 95 MG/DL (ref 70–99)
GLUCOSE BLDC GLUCOMTR-MCNC: 97 MG/DL (ref 70–99)
GLUCOSE SERPL-MCNC: 108 MG/DL (ref 70–99)
GLUCOSE SERPL-MCNC: 109 MG/DL (ref 70–99)
GLUCOSE SERPL-MCNC: 114 MG/DL (ref 70–99)
GLUCOSE SERPL-MCNC: 124 MG/DL (ref 70–99)
GLUCOSE SERPL-MCNC: 131 MG/DL (ref 70–99)
GLUCOSE SERPL-MCNC: 152 MG/DL (ref 70–99)
GLUCOSE SERPL-MCNC: 193 MG/DL (ref 70–99)
GLUCOSE SERPL-MCNC: 197 MG/DL (ref 70–99)
GLUCOSE SERPL-MCNC: 204 MG/DL (ref 70–99)
GLUCOSE SERPL-MCNC: 206 MG/DL (ref 70–99)
GLUCOSE SERPL-MCNC: 206 MG/DL (ref 70–99)
GLUCOSE SERPL-MCNC: 219 MG/DL (ref 70–99)
GLUCOSE SERPL-MCNC: 231 MG/DL (ref 70–99)
GLUCOSE SERPL-MCNC: 237 MG/DL (ref 70–99)
GLUCOSE SERPL-MCNC: 294 MG/DL (ref 70–99)
GLUCOSE SERPL-MCNC: 303 MG/DL (ref 70–99)
GLUCOSE SERPL-MCNC: 309 MG/DL (ref 70–99)
GLUCOSE SERPL-MCNC: 74 MG/DL (ref 70–99)
GLUCOSE SERPL-MCNC: 95 MG/DL (ref 70–99)
GLUCOSE UR STRIP-MCNC: 70 MG/DL
GLUCOSE UR STRIP-MCNC: 70 MG/DL
GLUCOSE UR STRIP-MCNC: NEGATIVE MG/DL
HBA1C MFR BLD: 5.8 % (ref 0–5.6)
HCO3 BLDV-SCNC: 17 MMOL/L (ref 21–28)
HCT VFR BLD AUTO: 27.7 % (ref 35–47)
HCT VFR BLD AUTO: 29.5 % (ref 35–47)
HCT VFR BLD AUTO: 29.6 % (ref 35–47)
HCT VFR BLD AUTO: 29.9 % (ref 35–47)
HCT VFR BLD AUTO: 30.1 % (ref 35–47)
HCT VFR BLD AUTO: 30.1 % (ref 35–47)
HCT VFR BLD AUTO: 31.2 % (ref 35–47)
HCT VFR BLD AUTO: 33.2 % (ref 35–47)
HCT VFR BLD AUTO: 33.2 % (ref 35–47)
HCT VFR BLD AUTO: 33.7 % (ref 35–47)
HCT VFR BLD AUTO: 33.8 % (ref 35–47)
HGB BLD-MCNC: 10.3 G/DL (ref 11.7–15.7)
HGB BLD-MCNC: 10.4 G/DL (ref 11.7–15.7)
HGB BLD-MCNC: 10.4 G/DL (ref 11.7–15.7)
HGB BLD-MCNC: 10.7 G/DL (ref 11.7–15.7)
HGB BLD-MCNC: 8.1 G/DL (ref 11.7–15.7)
HGB BLD-MCNC: 8.8 G/DL (ref 11.7–15.7)
HGB BLD-MCNC: 8.8 G/DL (ref 11.7–15.7)
HGB BLD-MCNC: 8.9 G/DL (ref 11.7–15.7)
HGB BLD-MCNC: 9.1 G/DL (ref 11.7–15.7)
HGB BLD-MCNC: 9.1 G/DL (ref 11.7–15.7)
HGB BLD-MCNC: 9.3 G/DL (ref 11.7–15.7)
HGB BLD-MCNC: 9.5 G/DL (ref 11.7–15.7)
HGB BLD-MCNC: 9.9 G/DL (ref 11.7–15.7)
HGB UR QL STRIP: ABNORMAL
IMM GRANULOCYTES # BLD: 0 10E9/L (ref 0–0.4)
IMM GRANULOCYTES # BLD: 0.1 10E9/L (ref 0–0.4)
IMM GRANULOCYTES # BLD: 0.1 10E9/L (ref 0–0.4)
IMM GRANULOCYTES NFR BLD: 0.3 %
IMM GRANULOCYTES NFR BLD: 0.4 %
IMM GRANULOCYTES NFR BLD: 0.4 %
IMM GRANULOCYTES NFR BLD: 0.6 %
IMM GRANULOCYTES NFR BLD: 0.7 %
IMM GRANULOCYTES NFR BLD: 1.4 %
INR PPP: 1.12 (ref 0.86–1.14)
INTERPRETATION ECG - MUSE: NORMAL
IRON SATN MFR SERPL: 21 % (ref 15–46)
IRON SERPL-MCNC: 30 UG/DL (ref 35–180)
KETONES UR STRIP-MCNC: NEGATIVE MG/DL
LACTATE BLD-SCNC: 0.8 MMOL/L (ref 0.7–2)
LACTATE BLD-SCNC: 0.8 MMOL/L (ref 0.7–2.1)
LEUKOCYTE ESTERASE UR QL STRIP: ABNORMAL
LYMPHOCYTES # BLD AUTO: 0.9 10E9/L (ref 0.8–5.3)
LYMPHOCYTES # BLD AUTO: 1 10E9/L (ref 0.8–5.3)
LYMPHOCYTES # BLD AUTO: 1.2 10E9/L (ref 0.8–5.3)
LYMPHOCYTES # BLD AUTO: 1.6 10E9/L (ref 0.8–5.3)
LYMPHOCYTES # BLD AUTO: 1.7 10E9/L (ref 0.8–5.3)
LYMPHOCYTES # BLD AUTO: 1.7 10E9/L (ref 0.8–5.3)
LYMPHOCYTES NFR BLD AUTO: 12.4 %
LYMPHOCYTES NFR BLD AUTO: 14.2 %
LYMPHOCYTES NFR BLD AUTO: 15.7 %
LYMPHOCYTES NFR BLD AUTO: 20.8 %
LYMPHOCYTES NFR BLD AUTO: 23.2 %
LYMPHOCYTES NFR BLD AUTO: 23.7 %
Lab: ABNORMAL
Lab: ABNORMAL
Lab: NORMAL
MCH RBC QN AUTO: 28.3 PG (ref 26.5–33)
MCH RBC QN AUTO: 28.5 PG (ref 26.5–33)
MCH RBC QN AUTO: 28.5 PG (ref 26.5–33)
MCH RBC QN AUTO: 28.6 PG (ref 26.5–33)
MCH RBC QN AUTO: 28.7 PG (ref 26.5–33)
MCH RBC QN AUTO: 28.7 PG (ref 26.5–33)
MCH RBC QN AUTO: 28.8 PG (ref 26.5–33)
MCH RBC QN AUTO: 28.8 PG (ref 26.5–33)
MCH RBC QN AUTO: 29 PG (ref 26.5–33)
MCH RBC QN AUTO: 29 PG (ref 26.5–33)
MCH RBC QN AUTO: 29.2 PG (ref 26.5–33)
MCH RBC QN AUTO: 29.4 PG (ref 26.5–33)
MCH RBC QN AUTO: 29.4 PG (ref 26.5–33)
MCHC RBC AUTO-ENTMCNC: 29.2 G/DL (ref 31.5–36.5)
MCHC RBC AUTO-ENTMCNC: 29.7 G/DL (ref 31.5–36.5)
MCHC RBC AUTO-ENTMCNC: 29.7 G/DL (ref 31.5–36.5)
MCHC RBC AUTO-ENTMCNC: 30.2 G/DL (ref 31.5–36.5)
MCHC RBC AUTO-ENTMCNC: 30.2 G/DL (ref 31.5–36.5)
MCHC RBC AUTO-ENTMCNC: 30.7 G/DL (ref 31.5–36.5)
MCHC RBC AUTO-ENTMCNC: 30.9 G/DL (ref 31.5–36.5)
MCHC RBC AUTO-ENTMCNC: 31 G/DL (ref 31.5–36.5)
MCHC RBC AUTO-ENTMCNC: 31.1 G/DL (ref 31.5–36.5)
MCHC RBC AUTO-ENTMCNC: 31.3 G/DL (ref 31.5–36.5)
MCHC RBC AUTO-ENTMCNC: 31.6 G/DL (ref 31.5–36.5)
MCHC RBC AUTO-ENTMCNC: 31.7 G/DL (ref 31.5–36.5)
MCHC RBC AUTO-ENTMCNC: 31.7 G/DL (ref 31.5–36.5)
MCV RBC AUTO: 90 FL (ref 78–100)
MCV RBC AUTO: 91 FL (ref 78–100)
MCV RBC AUTO: 92 FL (ref 78–100)
MCV RBC AUTO: 93 FL (ref 78–100)
MCV RBC AUTO: 94 FL (ref 78–100)
MCV RBC AUTO: 95 FL (ref 78–100)
MCV RBC AUTO: 96 FL (ref 78–100)
MCV RBC AUTO: 97 FL (ref 78–100)
MCV RBC AUTO: 99 FL (ref 78–100)
MONOCYTES # BLD AUTO: 0.4 10E9/L (ref 0–1.3)
MONOCYTES # BLD AUTO: 0.5 10E9/L (ref 0–1.3)
MONOCYTES # BLD AUTO: 0.5 10E9/L (ref 0–1.3)
MONOCYTES # BLD AUTO: 0.6 10E9/L (ref 0–1.3)
MONOCYTES # BLD AUTO: 0.6 10E9/L (ref 0–1.3)
MONOCYTES # BLD AUTO: 0.7 10E9/L (ref 0–1.3)
MONOCYTES NFR BLD AUTO: 5.5 %
MONOCYTES NFR BLD AUTO: 5.8 %
MONOCYTES NFR BLD AUTO: 7.5 %
MONOCYTES NFR BLD AUTO: 7.8 %
MONOCYTES NFR BLD AUTO: 8.5 %
MONOCYTES NFR BLD AUTO: 8.9 %
MUCOUS THREADS #/AREA URNS LPF: PRESENT /LPF
MUCOUS THREADS #/AREA URNS LPF: PRESENT /LPF
NEUTROPHILS # BLD AUTO: 4.5 10E9/L (ref 1.6–8.3)
NEUTROPHILS # BLD AUTO: 4.6 10E9/L (ref 1.6–8.3)
NEUTROPHILS # BLD AUTO: 4.7 10E9/L (ref 1.6–8.3)
NEUTROPHILS # BLD AUTO: 5.2 10E9/L (ref 1.6–8.3)
NEUTROPHILS # BLD AUTO: 6 10E9/L (ref 1.6–8.3)
NEUTROPHILS # BLD AUTO: 6.3 10E9/L (ref 1.6–8.3)
NEUTROPHILS NFR BLD AUTO: 64.9 %
NEUTROPHILS NFR BLD AUTO: 65.6 %
NEUTROPHILS NFR BLD AUTO: 67.3 %
NEUTROPHILS NFR BLD AUTO: 71.3 %
NEUTROPHILS NFR BLD AUTO: 77.3 %
NEUTROPHILS NFR BLD AUTO: 80.6 %
NITRATE UR QL: NEGATIVE
NITRATE UR QL: NEGATIVE
NITRATE UR QL: POSITIVE
NRBC # BLD AUTO: 0 10*3/UL
NRBC BLD AUTO-RTO: 0 /100
O2/TOTAL GAS SETTING VFR VENT: 30 %
OXYHGB MFR BLDV: 93 %
PCO2 BLDV: 40 MM HG (ref 40–50)
PCO2 BLDV: 46 MM HG (ref 40–50)
PH BLDV: 7.22 PH (ref 7.32–7.43)
PH BLDV: 7.24 PH (ref 7.32–7.43)
PH UR STRIP: 6 PH (ref 5–7)
PH UR STRIP: 6.5 PH (ref 5–7)
PH UR STRIP: 7.5 PH (ref 5–7)
PHOSPHATE SERPL-MCNC: 4.2 MG/DL (ref 2.5–4.5)
PHOSPHATE SERPL-MCNC: 4.7 MG/DL (ref 2.5–4.5)
PHOSPHATE SERPL-MCNC: 4.8 MG/DL (ref 2.5–4.5)
PHOSPHATE SERPL-MCNC: 4.9 MG/DL (ref 2.5–4.5)
PHOSPHATE SERPL-MCNC: 5.2 MG/DL (ref 2.5–4.5)
PHOSPHATE SERPL-MCNC: 6.9 MG/DL (ref 2.5–4.5)
PLATELET # BLD AUTO: 121 10E9/L (ref 150–450)
PLATELET # BLD AUTO: 126 10E9/L (ref 150–450)
PLATELET # BLD AUTO: 132 10E9/L (ref 150–450)
PLATELET # BLD AUTO: 133 10E9/L (ref 150–450)
PLATELET # BLD AUTO: 134 10E9/L (ref 150–450)
PLATELET # BLD AUTO: 151 10E9/L (ref 150–450)
PLATELET # BLD AUTO: 153 10E9/L (ref 150–450)
PLATELET # BLD AUTO: 155 10E9/L (ref 150–450)
PLATELET # BLD AUTO: 155 10E9/L (ref 150–450)
PLATELET # BLD AUTO: 159 10E9/L (ref 150–450)
PLATELET # BLD AUTO: 159 10E9/L (ref 150–450)
PLATELET # BLD AUTO: 164 10E9/L (ref 150–450)
PLATELET # BLD AUTO: 175 10E9/L (ref 150–450)
PO2 BLDV: 35 MM HG (ref 25–47)
PO2 BLDV: 71 MM HG (ref 25–47)
POTASSIUM SERPL-SCNC: 3.9 MMOL/L (ref 3.4–5.3)
POTASSIUM SERPL-SCNC: 3.9 MMOL/L (ref 3.4–5.3)
POTASSIUM SERPL-SCNC: 4 MMOL/L (ref 3.4–5.3)
POTASSIUM SERPL-SCNC: 4 MMOL/L (ref 3.4–5.3)
POTASSIUM SERPL-SCNC: 4.1 MMOL/L (ref 3.4–5.3)
POTASSIUM SERPL-SCNC: 4.1 MMOL/L (ref 3.4–5.3)
POTASSIUM SERPL-SCNC: 4.2 MMOL/L (ref 3.4–5.3)
POTASSIUM SERPL-SCNC: 4.2 MMOL/L (ref 3.4–5.3)
POTASSIUM SERPL-SCNC: 4.3 MMOL/L (ref 3.4–5.3)
POTASSIUM SERPL-SCNC: 4.3 MMOL/L (ref 3.4–5.3)
POTASSIUM SERPL-SCNC: 4.4 MMOL/L (ref 3.4–5.3)
POTASSIUM SERPL-SCNC: 4.4 MMOL/L (ref 3.4–5.3)
POTASSIUM SERPL-SCNC: 4.6 MMOL/L (ref 3.4–5.3)
POTASSIUM SERPL-SCNC: 5.3 MMOL/L (ref 3.4–5.3)
POTASSIUM SERPL-SCNC: 5.4 MMOL/L (ref 3.4–5.3)
PROT SERPL-MCNC: 6 G/DL (ref 6.8–8.8)
PROT SERPL-MCNC: 6.5 G/DL (ref 6.8–8.8)
PROT SERPL-MCNC: 8.4 G/DL (ref 6.8–8.8)
RBC # BLD AUTO: 2.81 10E12/L (ref 3.8–5.2)
RBC # BLD AUTO: 3.06 10E12/L (ref 3.8–5.2)
RBC # BLD AUTO: 3.07 10E12/L (ref 3.8–5.2)
RBC # BLD AUTO: 3.09 10E12/L (ref 3.8–5.2)
RBC # BLD AUTO: 3.09 10E12/L (ref 3.8–5.2)
RBC # BLD AUTO: 3.18 10E12/L (ref 3.8–5.2)
RBC # BLD AUTO: 3.19 10E12/L (ref 3.8–5.2)
RBC # BLD AUTO: 3.23 10E12/L (ref 3.8–5.2)
RBC # BLD AUTO: 3.45 10E12/L (ref 3.8–5.2)
RBC # BLD AUTO: 3.63 10E12/L (ref 3.8–5.2)
RBC # BLD AUTO: 3.64 10E12/L (ref 3.8–5.2)
RBC # BLD AUTO: 3.65 10E12/L (ref 3.8–5.2)
RBC # BLD AUTO: 3.69 10E12/L (ref 3.8–5.2)
RBC #/AREA URNS AUTO: 11 /HPF (ref 0–2)
RBC #/AREA URNS AUTO: 38 /HPF (ref 0–2)
RBC #/AREA URNS AUTO: 6 /HPF (ref 0–2)
SAO2 % BLDV FROM PO2: 57 %
SODIUM SERPL-SCNC: 136 MMOL/L (ref 133–144)
SODIUM SERPL-SCNC: 137 MMOL/L (ref 133–144)
SODIUM SERPL-SCNC: 138 MMOL/L (ref 133–144)
SODIUM SERPL-SCNC: 139 MMOL/L (ref 133–144)
SODIUM SERPL-SCNC: 140 MMOL/L (ref 133–144)
SODIUM SERPL-SCNC: 141 MMOL/L (ref 133–144)
SOURCE: ABNORMAL
SP GR UR STRIP: 1.01 (ref 1–1.03)
SPECIMEN SOURCE: ABNORMAL
SPECIMEN SOURCE: NORMAL
TACROLIMUS BLD-MCNC: 3.4 UG/L (ref 5–15)
TACROLIMUS BLD-MCNC: 5.5 UG/L (ref 5–15)
TACROLIMUS BLD-MCNC: 6.2 UG/L (ref 5–15)
TACROLIMUS BLD-MCNC: 6.6 UG/L (ref 5–15)
TIBC SERPL-MCNC: 142 UG/DL (ref 240–430)
TME LAST DOSE: ABNORMAL H
TME LAST DOSE: NORMAL H
URATE SERPL-MCNC: 7 MG/DL (ref 2.6–6)
UROBILINOGEN UR STRIP-MCNC: 0.2 MG/DL (ref 0–2)
UROBILINOGEN UR STRIP-MCNC: NORMAL MG/DL (ref 0–2)
UROBILINOGEN UR STRIP-MCNC: NORMAL MG/DL (ref 0–2)
WBC # BLD AUTO: 6.3 10E9/L (ref 4–11)
WBC # BLD AUTO: 6.8 10E9/L (ref 4–11)
WBC # BLD AUTO: 6.9 10E9/L (ref 4–11)
WBC # BLD AUTO: 7 10E9/L (ref 4–11)
WBC # BLD AUTO: 7 10E9/L (ref 4–11)
WBC # BLD AUTO: 7.1 10E9/L (ref 4–11)
WBC # BLD AUTO: 7.1 10E9/L (ref 4–11)
WBC # BLD AUTO: 7.2 10E9/L (ref 4–11)
WBC # BLD AUTO: 7.4 10E9/L (ref 4–11)
WBC # BLD AUTO: 7.6 10E9/L (ref 4–11)
WBC # BLD AUTO: 7.8 10E9/L (ref 4–11)
WBC # BLD AUTO: 7.9 10E9/L (ref 4–11)
WBC # BLD AUTO: 8.1 10E9/L (ref 4–11)
WBC #/AREA URNS AUTO: >182 /HPF (ref 0–5)
WBC CLUMPS #/AREA URNS HPF: PRESENT /HPF
YEAST #/AREA URNS HPF: ABNORMAL /HPF

## 2019-01-01 PROCEDURE — 40000257 ZZH STATISTIC CONSULT NO CHARGE VASC ACCESS

## 2019-01-01 PROCEDURE — 85027 COMPLETE CBC AUTOMATED: CPT | Performed by: INTERNAL MEDICINE

## 2019-01-01 PROCEDURE — 99232 SBSQ HOSP IP/OBS MODERATE 35: CPT | Performed by: HOSPITALIST

## 2019-01-01 PROCEDURE — 25000132 ZZH RX MED GY IP 250 OP 250 PS 637: Mod: GY | Performed by: INTERNAL MEDICINE

## 2019-01-01 PROCEDURE — 99215 OFFICE O/P EST HI 40 MIN: CPT | Performed by: NURSE PRACTITIONER

## 2019-01-01 PROCEDURE — 80048 BASIC METABOLIC PNL TOTAL CA: CPT | Performed by: FAMILY MEDICINE

## 2019-01-01 PROCEDURE — 80197 ASSAY OF TACROLIMUS: CPT | Performed by: FAMILY MEDICINE

## 2019-01-01 PROCEDURE — 25800030 ZZH RX IP 258 OP 636: Performed by: INTERNAL MEDICINE

## 2019-01-01 PROCEDURE — 85025 COMPLETE CBC W/AUTO DIFF WBC: CPT | Performed by: PHYSICIAN ASSISTANT

## 2019-01-01 PROCEDURE — 25000131 ZZH RX MED GY IP 250 OP 636 PS 637: Mod: GY | Performed by: INTERNAL MEDICINE

## 2019-01-01 PROCEDURE — 80069 RENAL FUNCTION PANEL: CPT | Performed by: HOSPITALIST

## 2019-01-01 PROCEDURE — 87800 DETECT AGNT MULT DNA DIREC: CPT | Performed by: PHYSICIAN ASSISTANT

## 2019-01-01 PROCEDURE — C1751 CATH, INF, PER/CENT/MIDLINE: HCPCS

## 2019-01-01 PROCEDURE — 80048 BASIC METABOLIC PNL TOTAL CA: CPT | Performed by: INTERNAL MEDICINE

## 2019-01-01 PROCEDURE — 87106 FUNGI IDENTIFICATION YEAST: CPT | Performed by: EMERGENCY MEDICINE

## 2019-01-01 PROCEDURE — 40000239 ZZH STATISTIC VAT ROUNDS

## 2019-01-01 PROCEDURE — 82962 GLUCOSE BLOOD TEST: CPT

## 2019-01-01 PROCEDURE — 25000128 H RX IP 250 OP 636: Performed by: INTERNAL MEDICINE

## 2019-01-01 PROCEDURE — 80048 BASIC METABOLIC PNL TOTAL CA: CPT | Performed by: HOSPITALIST

## 2019-01-01 PROCEDURE — 87186 SC STD MICRODIL/AGAR DIL: CPT | Performed by: PHYSICIAN ASSISTANT

## 2019-01-01 PROCEDURE — 36415 COLL VENOUS BLD VENIPUNCTURE: CPT | Performed by: HOSPITALIST

## 2019-01-01 PROCEDURE — 12000000 ZZH R&B MED SURG/OB

## 2019-01-01 PROCEDURE — 00000146 ZZHCL STATISTIC GLUCOSE BY METER IP

## 2019-01-01 PROCEDURE — 99223 1ST HOSP IP/OBS HIGH 75: CPT | Mod: AI | Performed by: INTERNAL MEDICINE

## 2019-01-01 PROCEDURE — G2012 BRIEF CHECK IN BY MD/QHP: HCPCS | Performed by: NURSE PRACTITIONER

## 2019-01-01 PROCEDURE — 25800030 ZZH RX IP 258 OP 636: Performed by: HOSPITALIST

## 2019-01-01 PROCEDURE — 83605 ASSAY OF LACTIC ACID: CPT | Performed by: NURSE PRACTITIONER

## 2019-01-01 PROCEDURE — 25000132 ZZH RX MED GY IP 250 OP 250 PS 637: Mod: GY | Performed by: HOSPITALIST

## 2019-01-01 PROCEDURE — G0463 HOSPITAL OUTPT CLINIC VISIT: HCPCS

## 2019-01-01 PROCEDURE — 92526 ORAL FUNCTION THERAPY: CPT | Mod: GN

## 2019-01-01 PROCEDURE — 99233 SBSQ HOSP IP/OBS HIGH 50: CPT | Performed by: INTERNAL MEDICINE

## 2019-01-01 PROCEDURE — 84100 ASSAY OF PHOSPHORUS: CPT | Performed by: INTERNAL MEDICINE

## 2019-01-01 PROCEDURE — G0179 MD RECERTIFICATION HHA PT: HCPCS | Performed by: FAMILY MEDICINE

## 2019-01-01 PROCEDURE — 99233 SBSQ HOSP IP/OBS HIGH 50: CPT | Performed by: HOSPITALIST

## 2019-01-01 PROCEDURE — 85025 COMPLETE CBC W/AUTO DIFF WBC: CPT | Performed by: FAMILY MEDICINE

## 2019-01-01 PROCEDURE — 40000275 ZZH STATISTIC RCP TIME EA 10 MIN

## 2019-01-01 PROCEDURE — 86140 C-REACTIVE PROTEIN: CPT | Performed by: INTERNAL MEDICINE

## 2019-01-01 PROCEDURE — 82565 ASSAY OF CREATININE: CPT | Performed by: HOSPITALIST

## 2019-01-01 PROCEDURE — 83036 HEMOGLOBIN GLYCOSYLATED A1C: CPT | Performed by: INTERNAL MEDICINE

## 2019-01-01 PROCEDURE — C9113 INJ PANTOPRAZOLE SODIUM, VIA: HCPCS | Performed by: INTERNAL MEDICINE

## 2019-01-01 PROCEDURE — 02HV33Z INSERTION OF INFUSION DEVICE INTO SUPERIOR VENA CAVA, PERCUTANEOUS APPROACH: ICD-10-PCS | Performed by: RADIOLOGY

## 2019-01-01 PROCEDURE — 83540 ASSAY OF IRON: CPT | Performed by: INTERNAL MEDICINE

## 2019-01-01 PROCEDURE — 36415 COLL VENOUS BLD VENIPUNCTURE: CPT | Performed by: INTERNAL MEDICINE

## 2019-01-01 PROCEDURE — 25000125 ZZHC RX 250: Performed by: INTERNAL MEDICINE

## 2019-01-01 PROCEDURE — 99291 CRITICAL CARE FIRST HOUR: CPT

## 2019-01-01 PROCEDURE — 85610 PROTHROMBIN TIME: CPT | Performed by: NURSE PRACTITIONER

## 2019-01-01 PROCEDURE — 93005 ELECTROCARDIOGRAM TRACING: CPT

## 2019-01-01 PROCEDURE — 99285 EMERGENCY DEPT VISIT HI MDM: CPT | Mod: 25

## 2019-01-01 PROCEDURE — 99606 MTMS BY PHARM EST 15 MIN: CPT | Performed by: PHARMACIST

## 2019-01-01 PROCEDURE — 36573 INSJ PICC RS&I 5 YR+: CPT

## 2019-01-01 PROCEDURE — 82550 ASSAY OF CK (CPK): CPT | Performed by: HOSPITALIST

## 2019-01-01 PROCEDURE — 80197 ASSAY OF TACROLIMUS: CPT | Performed by: INTERNAL MEDICINE

## 2019-01-01 PROCEDURE — 25800025 ZZH RX 258: Performed by: INTERNAL MEDICINE

## 2019-01-01 PROCEDURE — 82728 ASSAY OF FERRITIN: CPT | Performed by: INTERNAL MEDICINE

## 2019-01-01 PROCEDURE — 36569 INSJ PICC 5 YR+ W/O IMAGING: CPT | Mod: 52

## 2019-01-01 PROCEDURE — 87040 BLOOD CULTURE FOR BACTERIA: CPT | Performed by: PHYSICIAN ASSISTANT

## 2019-01-01 PROCEDURE — 36600 WITHDRAWAL OF ARTERIAL BLOOD: CPT

## 2019-01-01 PROCEDURE — 99221 1ST HOSP IP/OBS SF/LOW 40: CPT | Performed by: PSYCHIATRY & NEUROLOGY

## 2019-01-01 PROCEDURE — 25000131 ZZH RX MED GY IP 250 OP 636 PS 637: Mod: GY | Performed by: HOSPITALIST

## 2019-01-01 PROCEDURE — 83036 HEMOGLOBIN GLYCOSYLATED A1C: CPT | Performed by: HOSPITALIST

## 2019-01-01 PROCEDURE — 25000128 H RX IP 250 OP 636: Performed by: HOSPITALIST

## 2019-01-01 PROCEDURE — 96374 THER/PROPH/DIAG INJ IV PUSH: CPT

## 2019-01-01 PROCEDURE — 85027 COMPLETE CBC AUTOMATED: CPT | Performed by: HOSPITALIST

## 2019-01-01 PROCEDURE — 99239 HOSP IP/OBS DSCHRG MGMT >30: CPT | Performed by: HOSPITALIST

## 2019-01-01 PROCEDURE — 94660 CPAP INITIATION&MGMT: CPT

## 2019-01-01 PROCEDURE — 82805 BLOOD GASES W/O2 SATURATION: CPT | Performed by: INTERNAL MEDICINE

## 2019-01-01 PROCEDURE — 25800030 ZZH RX IP 258 OP 636: Performed by: PHYSICIAN ASSISTANT

## 2019-01-01 PROCEDURE — 81001 URINALYSIS AUTO W/SCOPE: CPT | Performed by: NURSE PRACTITIONER

## 2019-01-01 PROCEDURE — 80048 BASIC METABOLIC PNL TOTAL CA: CPT | Performed by: PHYSICIAN ASSISTANT

## 2019-01-01 PROCEDURE — G0180 MD CERTIFICATION HHA PATIENT: HCPCS | Performed by: FAMILY MEDICINE

## 2019-01-01 PROCEDURE — 87088 URINE BACTERIA CULTURE: CPT | Performed by: PHYSICIAN ASSISTANT

## 2019-01-01 PROCEDURE — 99607 MTMS BY PHARM ADDL 15 MIN: CPT | Performed by: PHARMACIST

## 2019-01-01 PROCEDURE — 82550 ASSAY OF CK (CPK): CPT | Performed by: INTERNAL MEDICINE

## 2019-01-01 PROCEDURE — 83605 ASSAY OF LACTIC ACID: CPT

## 2019-01-01 PROCEDURE — 36569 INSJ PICC 5 YR+ W/O IMAGING: CPT

## 2019-01-01 PROCEDURE — 87077 CULTURE AEROBIC IDENTIFY: CPT | Performed by: PHYSICIAN ASSISTANT

## 2019-01-01 PROCEDURE — 87040 BLOOD CULTURE FOR BACTERIA: CPT | Performed by: HOSPITALIST

## 2019-01-01 PROCEDURE — 87086 URINE CULTURE/COLONY COUNT: CPT | Performed by: FAMILY MEDICINE

## 2019-01-01 PROCEDURE — 92610 EVALUATE SWALLOWING FUNCTION: CPT | Mod: GN | Performed by: SPEECH-LANGUAGE PATHOLOGIST

## 2019-01-01 PROCEDURE — 87086 URINE CULTURE/COLONY COUNT: CPT | Performed by: PHYSICIAN ASSISTANT

## 2019-01-01 PROCEDURE — 80076 HEPATIC FUNCTION PANEL: CPT | Performed by: PHYSICIAN ASSISTANT

## 2019-01-01 PROCEDURE — 92526 ORAL FUNCTION THERAPY: CPT | Mod: GN | Performed by: SPEECH-LANGUAGE PATHOLOGIST

## 2019-01-01 PROCEDURE — 93010 ELECTROCARDIOGRAM REPORT: CPT | Performed by: INTERNAL MEDICINE

## 2019-01-01 PROCEDURE — 70450 CT HEAD/BRAIN W/O DYE: CPT

## 2019-01-01 PROCEDURE — 85025 COMPLETE CBC W/AUTO DIFF WBC: CPT | Performed by: INTERNAL MEDICINE

## 2019-01-01 PROCEDURE — 27211407 ZZ H KIT CATH IV 18 OR 20 G, POWERGLIDE BASIC

## 2019-01-01 PROCEDURE — 80069 RENAL FUNCTION PANEL: CPT | Performed by: INTERNAL MEDICINE

## 2019-01-01 PROCEDURE — 27210740 ZZH ACCESSORY CR3

## 2019-01-01 PROCEDURE — 80053 COMPREHEN METABOLIC PANEL: CPT | Performed by: INTERNAL MEDICINE

## 2019-01-01 PROCEDURE — 71046 X-RAY EXAM CHEST 2 VIEWS: CPT

## 2019-01-01 PROCEDURE — 81001 URINALYSIS AUTO W/SCOPE: CPT | Performed by: EMERGENCY MEDICINE

## 2019-01-01 PROCEDURE — 25000128 H RX IP 250 OP 636: Performed by: PHYSICIAN ASSISTANT

## 2019-01-01 PROCEDURE — 85025 COMPLETE CBC W/AUTO DIFF WBC: CPT | Performed by: EMERGENCY MEDICINE

## 2019-01-01 PROCEDURE — 84550 ASSAY OF BLOOD/URIC ACID: CPT | Performed by: INTERNAL MEDICINE

## 2019-01-01 PROCEDURE — 87086 URINE CULTURE/COLONY COUNT: CPT | Performed by: EMERGENCY MEDICINE

## 2019-01-01 PROCEDURE — 87040 BLOOD CULTURE FOR BACTERIA: CPT | Performed by: EMERGENCY MEDICINE

## 2019-01-01 PROCEDURE — 40000894 ZZH STATISTIC OT IP EVAL DEFER

## 2019-01-01 PROCEDURE — 99232 SBSQ HOSP IP/OBS MODERATE 35: CPT | Performed by: INTERNAL MEDICINE

## 2019-01-01 PROCEDURE — 87040 BLOOD CULTURE FOR BACTERIA: CPT | Performed by: INTERNAL MEDICINE

## 2019-01-01 PROCEDURE — 82746 ASSAY OF FOLIC ACID SERUM: CPT | Performed by: INTERNAL MEDICINE

## 2019-01-01 PROCEDURE — 83550 IRON BINDING TEST: CPT | Performed by: INTERNAL MEDICINE

## 2019-01-01 PROCEDURE — 82803 BLOOD GASES ANY COMBINATION: CPT

## 2019-01-01 PROCEDURE — 81001 URINALYSIS AUTO W/SCOPE: CPT | Performed by: FAMILY MEDICINE

## 2019-01-01 PROCEDURE — 87040 BLOOD CULTURE FOR BACTERIA: CPT | Performed by: NURSE PRACTITIONER

## 2019-01-01 PROCEDURE — 99350 HOME/RES VST EST HIGH MDM 60: CPT | Performed by: NURSE PRACTITIONER

## 2019-01-01 RX ORDER — AMLODIPINE BESYLATE 10 MG/1
10 TABLET ORAL DAILY
Status: DISCONTINUED | OUTPATIENT
Start: 2019-01-01 | End: 2019-01-01 | Stop reason: HOSPADM

## 2019-01-01 RX ORDER — PREDNISONE 5 MG/1
5 TABLET ORAL DAILY
Status: DISCONTINUED | OUTPATIENT
Start: 2019-01-01 | End: 2019-01-01 | Stop reason: HOSPADM

## 2019-01-01 RX ORDER — PRAVASTATIN SODIUM 10 MG
10 TABLET ORAL DAILY
Status: DISCONTINUED | OUTPATIENT
Start: 2019-01-01 | End: 2019-01-01 | Stop reason: HOSPADM

## 2019-01-01 RX ORDER — AMOXICILLIN 250 MG
1 CAPSULE ORAL 2 TIMES DAILY PRN
Status: DISCONTINUED | OUTPATIENT
Start: 2019-01-01 | End: 2019-01-01 | Stop reason: HOSPADM

## 2019-01-01 RX ORDER — PIPERACILLIN SODIUM, TAZOBACTAM SODIUM 3; .375 G/15ML; G/15ML
3.38 INJECTION, POWDER, LYOPHILIZED, FOR SOLUTION INTRAVENOUS EVERY 6 HOURS
Status: DISCONTINUED | OUTPATIENT
Start: 2019-01-01 | End: 2019-01-01

## 2019-01-01 RX ORDER — PREDNISONE 10 MG/1
10 TABLET ORAL DAILY
Status: DISCONTINUED | OUTPATIENT
Start: 2019-01-01 | End: 2019-01-01

## 2019-01-01 RX ORDER — CEFAZOLIN SODIUM 1 G/3ML
2 INJECTION, POWDER, FOR SOLUTION INTRAMUSCULAR; INTRAVENOUS EVERY 12 HOURS
Refills: 0 | Status: ON HOLD | DISCHARGE
Start: 2019-01-01 | End: 2019-01-01

## 2019-01-01 RX ORDER — LACTOBACILLUS RHAMNOSUS GG 10B CELL
1 CAPSULE ORAL 3 TIMES DAILY
Status: DISCONTINUED | OUTPATIENT
Start: 2019-01-01 | End: 2019-01-01 | Stop reason: HOSPADM

## 2019-01-01 RX ORDER — PRAZOSIN HYDROCHLORIDE 1 MG/1
2 CAPSULE ORAL AT BEDTIME
Qty: 60 CAPSULE | Refills: 1 | Status: SHIPPED | OUTPATIENT
Start: 2019-01-01 | End: 2019-01-01

## 2019-01-01 RX ORDER — LEVOTHYROXINE SODIUM 50 UG/1
50 TABLET ORAL
Status: DISCONTINUED | OUTPATIENT
Start: 2019-01-01 | End: 2019-01-01

## 2019-01-01 RX ORDER — GABAPENTIN 300 MG/1
300 CAPSULE ORAL 2 TIMES DAILY
Qty: 60 CAPSULE | Refills: 1 | COMMUNITY
Start: 2019-01-01 | End: 2020-01-01

## 2019-01-01 RX ORDER — ONDANSETRON 4 MG/1
4 TABLET, ORALLY DISINTEGRATING ORAL EVERY 6 HOURS PRN
Status: DISCONTINUED | OUTPATIENT
Start: 2019-01-01 | End: 2019-01-01 | Stop reason: HOSPADM

## 2019-01-01 RX ORDER — ONDANSETRON 4 MG/1
4 TABLET, ORALLY DISINTEGRATING ORAL DAILY PRN
Qty: 20 TABLET | Refills: 1 | Status: SHIPPED | OUTPATIENT
Start: 2019-01-01 | End: 2020-01-01

## 2019-01-01 RX ORDER — NICOTINE POLACRILEX 4 MG
15-30 LOZENGE BUCCAL
Status: DISCONTINUED | OUTPATIENT
Start: 2019-01-01 | End: 2019-01-01 | Stop reason: HOSPADM

## 2019-01-01 RX ORDER — BACLOFEN 10 MG/1
2.5 TABLET ORAL 2 TIMES DAILY PRN
Status: DISCONTINUED | OUTPATIENT
Start: 2019-01-01 | End: 2019-01-01 | Stop reason: HOSPADM

## 2019-01-01 RX ORDER — LOPERAMIDE HCL 2 MG
2 CAPSULE ORAL 3 TIMES DAILY PRN
Status: DISCONTINUED | OUTPATIENT
Start: 2019-01-01 | End: 2019-01-01 | Stop reason: HOSPADM

## 2019-01-01 RX ORDER — GABAPENTIN 100 MG/1
100 CAPSULE ORAL 3 TIMES DAILY
Qty: 270 CAPSULE | Refills: 0 | OUTPATIENT
Start: 2019-01-01 | End: 2020-01-01

## 2019-01-01 RX ORDER — PIPERACILLIN SODIUM, TAZOBACTAM SODIUM 3; .375 G/15ML; G/15ML
3.38 INJECTION, POWDER, LYOPHILIZED, FOR SOLUTION INTRAVENOUS EVERY 6 HOURS
Status: DISCONTINUED | OUTPATIENT
Start: 2019-01-01 | End: 2019-01-01 | Stop reason: HOSPADM

## 2019-01-01 RX ORDER — ASPIRIN 81 MG/1
81 TABLET, CHEWABLE ORAL DAILY
Status: DISCONTINUED | OUTPATIENT
Start: 2019-01-01 | End: 2019-01-01

## 2019-01-01 RX ORDER — CEFAZOLIN SODIUM 2 G/100ML
2 INJECTION, SOLUTION INTRAVENOUS EVERY 8 HOURS
Status: DISCONTINUED | OUTPATIENT
Start: 2019-01-01 | End: 2019-01-01 | Stop reason: HOSPADM

## 2019-01-01 RX ORDER — ACETAMINOPHEN 650 MG/1
650 SUPPOSITORY RECTAL EVERY 4 HOURS PRN
Status: DISCONTINUED | OUTPATIENT
Start: 2019-01-01 | End: 2019-01-01 | Stop reason: HOSPADM

## 2019-01-01 RX ORDER — CEFAZOLIN SODIUM 2 G/100ML
2 INJECTION, SOLUTION INTRAVENOUS EVERY 12 HOURS
Status: DISCONTINUED | OUTPATIENT
Start: 2019-01-01 | End: 2019-01-01

## 2019-01-01 RX ORDER — FAMOTIDINE 20 MG/1
20 TABLET, FILM COATED ORAL 2 TIMES DAILY
Status: DISCONTINUED | OUTPATIENT
Start: 2019-01-01 | End: 2019-01-01 | Stop reason: DRUGHIGH

## 2019-01-01 RX ORDER — NICOTINE POLACRILEX 4 MG
15-30 LOZENGE BUCCAL
Status: DISCONTINUED | OUTPATIENT
Start: 2019-01-01 | End: 2019-01-01

## 2019-01-01 RX ORDER — SODIUM CHLORIDE 9 MG/ML
INJECTION, SOLUTION INTRAVENOUS CONTINUOUS
Status: DISCONTINUED | OUTPATIENT
Start: 2019-01-01 | End: 2019-01-01 | Stop reason: HOSPADM

## 2019-01-01 RX ORDER — SODIUM CHLORIDE 9 MG/ML
INJECTION, SOLUTION INTRAVENOUS CONTINUOUS
Status: DISCONTINUED | OUTPATIENT
Start: 2019-01-01 | End: 2019-01-01

## 2019-01-01 RX ORDER — CEFTRIAXONE 1 G/1
1 INJECTION, POWDER, FOR SOLUTION INTRAMUSCULAR; INTRAVENOUS ONCE
Status: COMPLETED | OUTPATIENT
Start: 2019-01-01 | End: 2019-01-01

## 2019-01-01 RX ORDER — PIPERACILLIN SODIUM, TAZOBACTAM SODIUM 2; .25 G/10ML; G/10ML
2.25 INJECTION, POWDER, LYOPHILIZED, FOR SOLUTION INTRAVENOUS EVERY 6 HOURS
Status: DISCONTINUED | OUTPATIENT
Start: 2019-01-01 | End: 2019-01-01

## 2019-01-01 RX ORDER — LOPERAMIDE HCL 2 MG
2 CAPSULE ORAL 3 TIMES DAILY PRN
Status: DISCONTINUED | OUTPATIENT
Start: 2019-01-01 | End: 2019-01-01 | Stop reason: CLARIF

## 2019-01-01 RX ORDER — PREDNISONE 5 MG/1
5 TABLET ORAL DAILY
Status: DISCONTINUED | OUTPATIENT
Start: 2019-01-01 | End: 2019-01-01

## 2019-01-01 RX ORDER — ONDANSETRON 2 MG/ML
4 INJECTION INTRAMUSCULAR; INTRAVENOUS EVERY 6 HOURS PRN
Status: DISCONTINUED | OUTPATIENT
Start: 2019-01-01 | End: 2019-01-01 | Stop reason: HOSPADM

## 2019-01-01 RX ORDER — SODIUM BICARBONATE 650 MG/1
650 TABLET ORAL DAILY
Status: DISCONTINUED | OUTPATIENT
Start: 2019-01-01 | End: 2019-01-01 | Stop reason: HOSPADM

## 2019-01-01 RX ORDER — CEFTRIAXONE 1 G/1
1 INJECTION, POWDER, FOR SOLUTION INTRAMUSCULAR; INTRAVENOUS EVERY 24 HOURS
Status: DISCONTINUED | OUTPATIENT
Start: 2019-01-01 | End: 2019-01-01

## 2019-01-01 RX ORDER — PIPERACILLIN SODIUM, TAZOBACTAM SODIUM 2; .25 G/10ML; G/10ML
2.25 INJECTION, POWDER, LYOPHILIZED, FOR SOLUTION INTRAVENOUS EVERY 8 HOURS
Status: DISCONTINUED | OUTPATIENT
Start: 2019-01-01 | End: 2019-01-01

## 2019-01-01 RX ORDER — GABAPENTIN 100 MG/1
CAPSULE ORAL
Qty: 30 CAPSULE | Refills: 0 | Status: ON HOLD | OUTPATIENT
Start: 2019-01-01 | End: 2019-01-01

## 2019-01-01 RX ORDER — GABAPENTIN 300 MG/1
300 CAPSULE ORAL AT BEDTIME
Status: DISCONTINUED | OUTPATIENT
Start: 2019-01-01 | End: 2019-01-01 | Stop reason: HOSPADM

## 2019-01-01 RX ORDER — GABAPENTIN 100 MG/1
100 CAPSULE ORAL 3 TIMES DAILY
Qty: 30 CAPSULE | Refills: 0 | Status: SHIPPED | OUTPATIENT
Start: 2019-01-01 | End: 2019-01-01

## 2019-01-01 RX ORDER — NALOXONE HYDROCHLORIDE 0.4 MG/ML
.1-.4 INJECTION, SOLUTION INTRAMUSCULAR; INTRAVENOUS; SUBCUTANEOUS
Status: DISCONTINUED | OUTPATIENT
Start: 2019-01-01 | End: 2019-01-01 | Stop reason: HOSPADM

## 2019-01-01 RX ORDER — SODIUM CHLORIDE 9 MG/ML
INJECTION, SOLUTION INTRAVENOUS ONCE
Status: COMPLETED | OUTPATIENT
Start: 2019-01-01 | End: 2019-01-01

## 2019-01-01 RX ORDER — FAMOTIDINE 20 MG/1
20 TABLET, FILM COATED ORAL DAILY
Status: DISCONTINUED | OUTPATIENT
Start: 2019-01-01 | End: 2019-01-01 | Stop reason: HOSPADM

## 2019-01-01 RX ORDER — LABETALOL 100 MG/1
300 TABLET, FILM COATED ORAL 2 TIMES DAILY
Status: DISCONTINUED | OUTPATIENT
Start: 2019-01-01 | End: 2019-01-01

## 2019-01-01 RX ORDER — TACROLIMUS 1 MG/1
2 CAPSULE ORAL 2 TIMES DAILY
Qty: 120 CAPSULE | Refills: 0 | Status: SHIPPED | OUTPATIENT
Start: 2019-01-01 | End: 2019-01-01

## 2019-01-01 RX ORDER — LEVOTHYROXINE SODIUM 50 UG/1
50 TABLET ORAL DAILY
Status: DISCONTINUED | OUTPATIENT
Start: 2019-01-01 | End: 2019-01-01 | Stop reason: HOSPADM

## 2019-01-01 RX ORDER — CEFAZOLIN SODIUM 2 G/100ML
2 INJECTION, SOLUTION INTRAVENOUS EVERY 12 HOURS
Status: DISCONTINUED | OUTPATIENT
Start: 2019-01-01 | End: 2019-01-01 | Stop reason: HOSPADM

## 2019-01-01 RX ORDER — TACROLIMUS 1 MG/1
2 CAPSULE ORAL 2 TIMES DAILY
Qty: 120 CAPSULE | Refills: 0 | Status: SHIPPED | OUTPATIENT
Start: 2019-01-01 | End: 2020-01-01

## 2019-01-01 RX ORDER — LABETALOL 100 MG/1
300 TABLET, FILM COATED ORAL 2 TIMES DAILY
Status: DISCONTINUED | OUTPATIENT
Start: 2019-01-01 | End: 2019-01-01 | Stop reason: HOSPADM

## 2019-01-01 RX ORDER — DEXTROSE MONOHYDRATE 25 G/50ML
25-50 INJECTION, SOLUTION INTRAVENOUS
Status: DISCONTINUED | OUTPATIENT
Start: 2019-01-01 | End: 2019-01-01

## 2019-01-01 RX ORDER — LIDOCAINE HYDROCHLORIDE 10 MG/ML
INJECTION, SOLUTION INFILTRATION; PERINEURAL
Status: DISCONTINUED
Start: 2019-01-01 | End: 2019-01-01 | Stop reason: HOSPADM

## 2019-01-01 RX ORDER — SODIUM BICARBONATE 650 MG/1
1300 TABLET ORAL 2 TIMES DAILY
Qty: 30 TABLET | Refills: 0 | Status: SHIPPED | OUTPATIENT
Start: 2019-01-01 | End: 2020-01-01

## 2019-01-01 RX ORDER — GABAPENTIN 100 MG/1
300 CAPSULE ORAL 2 TIMES DAILY
Qty: 30 CAPSULE | Refills: 0 | Status: ON HOLD | COMMUNITY
Start: 2019-01-01 | End: 2019-01-01

## 2019-01-01 RX ORDER — TACROLIMUS 1 MG/1
2 CAPSULE ORAL 2 TIMES DAILY
Status: DISCONTINUED | OUTPATIENT
Start: 2019-01-01 | End: 2019-01-01 | Stop reason: HOSPADM

## 2019-01-01 RX ORDER — GABAPENTIN 300 MG/1
600 CAPSULE ORAL 2 TIMES DAILY
Qty: 360 CAPSULE | Refills: 0 | OUTPATIENT
Start: 2019-01-01 | End: 2020-01-01

## 2019-01-01 RX ORDER — QUETIAPINE FUMARATE 50 MG/1
50 TABLET, FILM COATED ORAL 3 TIMES DAILY PRN
Status: DISCONTINUED | OUTPATIENT
Start: 2019-01-01 | End: 2019-01-01 | Stop reason: HOSPADM

## 2019-01-01 RX ORDER — LIDOCAINE 40 MG/G
CREAM TOPICAL
Status: DISCONTINUED | OUTPATIENT
Start: 2019-01-01 | End: 2019-01-01

## 2019-01-01 RX ORDER — NALOXONE HYDROCHLORIDE 0.4 MG/ML
INJECTION, SOLUTION INTRAMUSCULAR; INTRAVENOUS; SUBCUTANEOUS
Status: DISCONTINUED
Start: 2019-01-01 | End: 2019-01-01 | Stop reason: HOSPADM

## 2019-01-01 RX ORDER — DEXTROSE MONOHYDRATE 25 G/50ML
25-50 INJECTION, SOLUTION INTRAVENOUS
Status: DISCONTINUED | OUTPATIENT
Start: 2019-01-01 | End: 2019-01-01 | Stop reason: HOSPADM

## 2019-01-01 RX ORDER — LABETALOL 300 MG/1
300 TABLET, FILM COATED ORAL 2 TIMES DAILY
Qty: 30 TABLET | Refills: 0 | Status: SHIPPED | OUTPATIENT
Start: 2019-01-01 | End: 2019-01-01

## 2019-01-01 RX ORDER — HYDRALAZINE HYDROCHLORIDE 20 MG/ML
10 INJECTION INTRAMUSCULAR; INTRAVENOUS EVERY 4 HOURS PRN
Status: DISCONTINUED | OUTPATIENT
Start: 2019-01-01 | End: 2019-01-01 | Stop reason: HOSPADM

## 2019-01-01 RX ORDER — CEFAZOLIN SODIUM 1 G/3ML
2 INJECTION, POWDER, FOR SOLUTION INTRAMUSCULAR; INTRAVENOUS EVERY 8 HOURS
Qty: 1 EACH | DISCHARGE
Start: 2019-01-01 | End: 2019-01-01

## 2019-01-01 RX ORDER — FAMOTIDINE 20 MG/1
20 TABLET, FILM COATED ORAL 2 TIMES DAILY
Status: DISCONTINUED | OUTPATIENT
Start: 2019-01-01 | End: 2019-01-01

## 2019-01-01 RX ORDER — FUROSEMIDE 10 MG/ML
20 INJECTION INTRAMUSCULAR; INTRAVENOUS ONCE
Status: COMPLETED | OUTPATIENT
Start: 2019-01-01 | End: 2019-01-01

## 2019-01-01 RX ORDER — NITROGLYCERIN 0.4 MG/1
0.4 TABLET SUBLINGUAL EVERY 5 MIN PRN
Status: DISCONTINUED | OUTPATIENT
Start: 2019-01-01 | End: 2019-01-01 | Stop reason: HOSPADM

## 2019-01-01 RX ORDER — PRAZOSIN HYDROCHLORIDE 2 MG/1
2 CAPSULE ORAL AT BEDTIME
Status: DISCONTINUED | OUTPATIENT
Start: 2019-01-01 | End: 2019-01-01 | Stop reason: HOSPADM

## 2019-01-01 RX ORDER — PRAZOSIN HYDROCHLORIDE 2 MG/1
2 CAPSULE ORAL AT BEDTIME
Status: CANCELLED | OUTPATIENT
Start: 2019-01-01

## 2019-01-01 RX ORDER — GABAPENTIN 100 MG/1
300 CAPSULE ORAL AT BEDTIME
Qty: 30 CAPSULE | Refills: 0
Start: 2019-01-01 | End: 2019-01-01 | Stop reason: DRUGHIGH

## 2019-01-01 RX ORDER — ASPIRIN 81 MG/1
81 TABLET, CHEWABLE ORAL EVERY EVENING
Status: DISCONTINUED | OUTPATIENT
Start: 2019-01-01 | End: 2019-01-01 | Stop reason: HOSPADM

## 2019-01-01 RX ORDER — SODIUM BICARBONATE 650 MG/1
1300 TABLET ORAL 2 TIMES DAILY
Status: DISCONTINUED | OUTPATIENT
Start: 2019-01-01 | End: 2019-01-01 | Stop reason: HOSPADM

## 2019-01-01 RX ORDER — ACETAMINOPHEN 325 MG/1
325-650 TABLET ORAL EVERY 4 HOURS PRN
Status: DISCONTINUED | OUTPATIENT
Start: 2019-01-01 | End: 2019-01-01 | Stop reason: HOSPADM

## 2019-01-01 RX ORDER — ACETAMINOPHEN 325 MG/1
650 TABLET ORAL EVERY 4 HOURS PRN
Status: DISCONTINUED | OUTPATIENT
Start: 2019-01-01 | End: 2019-01-01 | Stop reason: HOSPADM

## 2019-01-01 RX ORDER — ASPIRIN 300 MG/1
300 SUPPOSITORY RECTAL DAILY
Status: DISCONTINUED | OUTPATIENT
Start: 2019-01-01 | End: 2019-01-01

## 2019-01-01 RX ORDER — LEVOTHYROXINE SODIUM ANHYDROUS 100 UG/5ML
25 INJECTION, POWDER, LYOPHILIZED, FOR SOLUTION INTRAVENOUS EVERY 24 HOURS
Status: DISCONTINUED | OUTPATIENT
Start: 2019-01-01 | End: 2019-01-01

## 2019-01-01 RX ORDER — SODIUM BICARBONATE 650 MG/1
650 TABLET ORAL DAILY
Qty: 30 TABLET | Refills: 0 | Status: ON HOLD | OUTPATIENT
Start: 2019-01-01 | End: 2019-01-01

## 2019-01-01 RX ORDER — LIDOCAINE 40 MG/G
CREAM TOPICAL
Status: DISCONTINUED | OUTPATIENT
Start: 2019-01-01 | End: 2019-01-01 | Stop reason: HOSPADM

## 2019-01-01 RX ORDER — GABAPENTIN 100 MG/1
100 CAPSULE ORAL 3 TIMES DAILY
Status: DISCONTINUED | OUTPATIENT
Start: 2019-01-01 | End: 2019-01-01 | Stop reason: HOSPADM

## 2019-01-01 RX ORDER — LOPERAMIDE HCL 2 MG
2 CAPSULE ORAL 3 TIMES DAILY PRN
Status: COMPLETED | OUTPATIENT
Start: 2019-01-01 | End: 2019-01-01

## 2019-01-01 RX ORDER — ASPIRIN 81 MG/1
81 TABLET, CHEWABLE ORAL DAILY
Status: DISCONTINUED | OUTPATIENT
Start: 2019-01-01 | End: 2019-01-01 | Stop reason: HOSPADM

## 2019-01-01 RX ORDER — SODIUM CHLORIDE 9 MG/ML
INJECTION, SOLUTION INTRAVENOUS CONTINUOUS
Status: ACTIVE | OUTPATIENT
Start: 2019-01-01 | End: 2019-01-01

## 2019-01-01 RX ORDER — SODIUM BICARBONATE 650 MG/1
1300 TABLET ORAL 2 TIMES DAILY
Qty: 30 TABLET | Refills: 0 | Status: SHIPPED | OUTPATIENT
Start: 2019-01-01 | End: 2019-01-01

## 2019-01-01 RX ORDER — AMLODIPINE BESYLATE 5 MG/1
TABLET ORAL
Qty: 60 TABLET | Refills: 2 | Status: SHIPPED | OUTPATIENT
Start: 2019-01-01 | End: 2019-01-01

## 2019-01-01 RX ORDER — BACLOFEN 10 MG/1
TABLET ORAL
Qty: 60 TABLET | Refills: 3 | COMMUNITY
Start: 2019-01-01

## 2019-01-01 RX ORDER — PRAZOSIN HYDROCHLORIDE 1 MG/1
2 CAPSULE ORAL AT BEDTIME
Qty: 180 CAPSULE | Refills: 1 | Status: SHIPPED | OUTPATIENT
Start: 2019-01-01 | End: 2020-01-01

## 2019-01-01 RX ORDER — AMOXICILLIN 250 MG
2 CAPSULE ORAL 2 TIMES DAILY PRN
Status: DISCONTINUED | OUTPATIENT
Start: 2019-01-01 | End: 2019-01-01 | Stop reason: HOSPADM

## 2019-01-01 RX ORDER — LEVOTHYROXINE SODIUM 50 UG/1
50 TABLET ORAL DAILY
Status: DISCONTINUED | OUTPATIENT
Start: 2019-01-01 | End: 2019-01-01

## 2019-01-01 RX ORDER — GABAPENTIN 300 MG/1
CAPSULE ORAL
Qty: 70 CAPSULE | Refills: 1 | Status: ON HOLD | OUTPATIENT
Start: 2019-01-01 | End: 2019-01-01

## 2019-01-01 RX ORDER — BACLOFEN 10 MG/1
2.5-5 TABLET ORAL 2 TIMES DAILY PRN
Qty: 60 TABLET | Refills: 3 | COMMUNITY
Start: 2019-01-01 | End: 2019-01-01

## 2019-01-01 RX ORDER — CEFAZOLIN SODIUM 2 G/100ML
2 INJECTION, SOLUTION INTRAVENOUS EVERY 8 HOURS
Status: DISCONTINUED | OUTPATIENT
Start: 2019-01-01 | End: 2019-01-01

## 2019-01-01 RX ORDER — NALOXONE HYDROCHLORIDE 0.4 MG/ML
0.4 INJECTION, SOLUTION INTRAMUSCULAR; INTRAVENOUS; SUBCUTANEOUS
Status: DISCONTINUED | OUTPATIENT
Start: 2019-01-01 | End: 2019-01-01 | Stop reason: HOSPADM

## 2019-01-01 RX ORDER — CEFAZOLIN SODIUM 1 G/3ML
1 INJECTION, POWDER, FOR SOLUTION INTRAMUSCULAR; INTRAVENOUS EVERY 12 HOURS
Status: DISCONTINUED | OUTPATIENT
Start: 2019-01-01 | End: 2019-01-01

## 2019-01-01 RX ORDER — ASPIRIN 81 MG/1
81 TABLET, CHEWABLE ORAL
Status: DISCONTINUED | OUTPATIENT
Start: 2019-01-01 | End: 2019-01-01

## 2019-01-01 RX ORDER — GABAPENTIN 300 MG/1
600 CAPSULE ORAL AT BEDTIME
Qty: 60 CAPSULE | Refills: 1 | Status: SHIPPED | OUTPATIENT
Start: 2019-01-01 | End: 2019-01-01

## 2019-01-01 RX ORDER — FAMOTIDINE 20 MG/1
20 TABLET, FILM COATED ORAL AT BEDTIME
Status: DISCONTINUED | OUTPATIENT
Start: 2019-01-01 | End: 2019-01-01 | Stop reason: HOSPADM

## 2019-01-01 RX ORDER — PREDNISONE 5 MG/1
5 TABLET ORAL DAILY
Qty: 90 TABLET | Refills: 3 | Status: SHIPPED | OUTPATIENT
Start: 2019-01-01 | End: 2020-01-01

## 2019-01-01 RX ORDER — SODIUM BICARBONATE 650 MG/1
650 TABLET ORAL DAILY
Status: DISCONTINUED | OUTPATIENT
Start: 2019-01-01 | End: 2019-01-01

## 2019-01-01 RX ORDER — LEVOTHYROXINE SODIUM 50 UG/1
TABLET ORAL
Qty: 90 TABLET | Refills: 0 | Status: SHIPPED | OUTPATIENT
Start: 2019-01-01 | End: 2020-01-01

## 2019-01-01 RX ORDER — LABETALOL HYDROCHLORIDE 5 MG/ML
10 INJECTION, SOLUTION INTRAVENOUS EVERY 6 HOURS
Status: DISCONTINUED | OUTPATIENT
Start: 2019-01-01 | End: 2019-01-01

## 2019-01-01 RX ORDER — LACTOBACILLUS RHAMNOSUS GG 10B CELL
1 CAPSULE ORAL 3 TIMES DAILY
Qty: 60 CAPSULE | Refills: 11 | Status: SHIPPED | OUTPATIENT
Start: 2019-01-01

## 2019-01-01 RX ORDER — SODIUM CHLORIDE, SODIUM LACTATE, POTASSIUM CHLORIDE, CALCIUM CHLORIDE 600; 310; 30; 20 MG/100ML; MG/100ML; MG/100ML; MG/100ML
INJECTION, SOLUTION INTRAVENOUS CONTINUOUS
Status: DISCONTINUED | OUTPATIENT
Start: 2019-01-01 | End: 2019-01-01 | Stop reason: HOSPADM

## 2019-01-01 RX ORDER — TACROLIMUS 1 MG/1
2 CAPSULE ORAL 2 TIMES DAILY
Qty: 120 CAPSULE | Refills: 11 | Status: CANCELLED | OUTPATIENT
Start: 2019-01-01

## 2019-01-01 RX ORDER — LABETALOL 300 MG/1
300 TABLET, FILM COATED ORAL
Status: DISCONTINUED | OUTPATIENT
Start: 2019-01-01 | End: 2019-01-01

## 2019-01-01 RX ORDER — PRAVASTATIN SODIUM 10 MG
10 TABLET ORAL EVERY EVENING
Status: DISCONTINUED | OUTPATIENT
Start: 2019-01-01 | End: 2019-01-01 | Stop reason: HOSPADM

## 2019-01-01 RX ORDER — POLYETHYLENE GLYCOL 3350 17 G/17G
17 POWDER, FOR SOLUTION ORAL DAILY PRN
Status: DISCONTINUED | OUTPATIENT
Start: 2019-01-01 | End: 2019-01-01 | Stop reason: HOSPADM

## 2019-01-01 RX ADMIN — LEVOTHYROXINE SODIUM 50 MCG: 50 TABLET ORAL at 16:21

## 2019-01-01 RX ADMIN — FAMOTIDINE 20 MG: 20 TABLET, FILM COATED ORAL at 09:34

## 2019-01-01 RX ADMIN — SODIUM BICARBONATE 650 MG TABLET 1300 MG: at 09:23

## 2019-01-01 RX ADMIN — TACROLIMUS 2 MG: 1 CAPSULE ORAL at 08:40

## 2019-01-01 RX ADMIN — PIPERACILLIN SODIUM,TAZOBACTAM SODIUM 2.25 G: 2; .25 INJECTION, POWDER, FOR SOLUTION INTRAVENOUS at 03:07

## 2019-01-01 RX ADMIN — SODIUM BICARBONATE 650 MG TABLET 1300 MG: at 09:00

## 2019-01-01 RX ADMIN — ASPIRIN 300 MG: 300 SUPPOSITORY RECTAL at 17:15

## 2019-01-01 RX ADMIN — LOPERAMIDE HYDROCHLORIDE 2 MG: 2 CAPSULE ORAL at 17:02

## 2019-01-01 RX ADMIN — AMLODIPINE BESYLATE 10 MG: 10 TABLET ORAL at 09:24

## 2019-01-01 RX ADMIN — PIPERACILLIN SODIUM,TAZOBACTAM SODIUM 2.25 G: 2; .25 INJECTION, POWDER, FOR SOLUTION INTRAVENOUS at 01:43

## 2019-01-01 RX ADMIN — TACROLIMUS 2 MG: 1 CAPSULE ORAL at 20:45

## 2019-01-01 RX ADMIN — AMLODIPINE BESYLATE 10 MG: 10 TABLET ORAL at 09:35

## 2019-01-01 RX ADMIN — AMLODIPINE BESYLATE 10 MG: 10 TABLET ORAL at 08:43

## 2019-01-01 RX ADMIN — PIPERACILLIN SODIUM AND TAZOBACTAM SODIUM 3.38 G: 3; .375 INJECTION, POWDER, LYOPHILIZED, FOR SOLUTION INTRAVENOUS at 14:25

## 2019-01-01 RX ADMIN — PREDNISONE 5 MG: 5 TABLET ORAL at 09:06

## 2019-01-01 RX ADMIN — LEVOTHYROXINE SODIUM 50 MCG: 50 TABLET ORAL at 09:24

## 2019-01-01 RX ADMIN — PREDNISONE 10 MG: 10 TABLET ORAL at 09:24

## 2019-01-01 RX ADMIN — ASPIRIN 81 MG 81 MG: 81 TABLET ORAL at 19:43

## 2019-01-01 RX ADMIN — PIPERACILLIN SODIUM,TAZOBACTAM SODIUM 2.25 G: 2; .25 INJECTION, POWDER, FOR SOLUTION INTRAVENOUS at 01:06

## 2019-01-01 RX ADMIN — PREDNISONE 5 MG: 5 TABLET ORAL at 08:43

## 2019-01-01 RX ADMIN — FUROSEMIDE 20 MG: 10 INJECTION, SOLUTION INTRAVENOUS at 14:46

## 2019-01-01 RX ADMIN — FAMOTIDINE 20 MG: 20 TABLET, FILM COATED ORAL at 08:01

## 2019-01-01 RX ADMIN — PIPERACILLIN SODIUM AND TAZOBACTAM SODIUM 3.38 G: 3; .375 INJECTION, POWDER, LYOPHILIZED, FOR SOLUTION INTRAVENOUS at 19:43

## 2019-01-01 RX ADMIN — LIDOCAINE HYDROCHLORIDE 2 ML: 10 INJECTION, SOLUTION EPIDURAL; INFILTRATION; INTRACAUDAL; PERINEURAL at 14:00

## 2019-01-01 RX ADMIN — LABETALOL HYDROCHLORIDE 300 MG: 100 TABLET, FILM COATED ORAL at 09:34

## 2019-01-01 RX ADMIN — SODIUM BICARBONATE 650 MG TABLET 650 MG: at 09:58

## 2019-01-01 RX ADMIN — TACROLIMUS 2 MG: 1 CAPSULE ORAL at 21:53

## 2019-01-01 RX ADMIN — AMLODIPINE BESYLATE 10 MG: 10 TABLET ORAL at 09:58

## 2019-01-01 RX ADMIN — Medication 1 CAPSULE: at 21:56

## 2019-01-01 RX ADMIN — PRAZOSIN HYDROCHLORIDE 2 MG: 2 CAPSULE ORAL at 22:44

## 2019-01-01 RX ADMIN — FAMOTIDINE 20 MG: 20 TABLET, FILM COATED ORAL at 21:47

## 2019-01-01 RX ADMIN — INSULIN ASPART 2 UNITS: 100 INJECTION, SOLUTION INTRAVENOUS; SUBCUTANEOUS at 21:47

## 2019-01-01 RX ADMIN — FAMOTIDINE 20 MG: 20 TABLET, FILM COATED ORAL at 09:02

## 2019-01-01 RX ADMIN — LABETALOL HYDROCHLORIDE 300 MG: 100 TABLET, FILM COATED ORAL at 09:23

## 2019-01-01 RX ADMIN — HYDROCORTISONE SODIUM SUCCINATE 50 MG: 100 INJECTION, POWDER, FOR SOLUTION INTRAMUSCULAR; INTRAVENOUS at 03:58

## 2019-01-01 RX ADMIN — FAMOTIDINE 20 MG: 20 TABLET, FILM COATED ORAL at 22:43

## 2019-01-01 RX ADMIN — Medication 1 CAPSULE: at 21:28

## 2019-01-01 RX ADMIN — INSULIN ASPART 2 UNITS: 100 INJECTION, SOLUTION INTRAVENOUS; SUBCUTANEOUS at 22:05

## 2019-01-01 RX ADMIN — Medication 1 CAPSULE: at 16:45

## 2019-01-01 RX ADMIN — TACROLIMUS 2 MG: 1 CAPSULE ORAL at 09:34

## 2019-01-01 RX ADMIN — OYSTER SHELL CALCIUM WITH VITAMIN D 1 TABLET: 500; 200 TABLET, FILM COATED ORAL at 12:48

## 2019-01-01 RX ADMIN — INSULIN GLARGINE 10 UNITS: 100 INJECTION, SOLUTION SUBCUTANEOUS at 21:29

## 2019-01-01 RX ADMIN — OYSTER SHELL CALCIUM WITH VITAMIN D 1 TABLET: 500; 200 TABLET, FILM COATED ORAL at 17:50

## 2019-01-01 RX ADMIN — ASPIRIN 81 MG 81 MG: 81 TABLET ORAL at 09:07

## 2019-01-01 RX ADMIN — PRAVASTATIN SODIUM 10 MG: 10 TABLET ORAL at 09:34

## 2019-01-01 RX ADMIN — TACROLIMUS 2 MG: 1 CAPSULE ORAL at 09:59

## 2019-01-01 RX ADMIN — ONDANSETRON 4 MG: 2 INJECTION INTRAMUSCULAR; INTRAVENOUS at 18:23

## 2019-01-01 RX ADMIN — SODIUM BICARBONATE 650 MG TABLET 1300 MG: at 21:54

## 2019-01-01 RX ADMIN — SODIUM BICARBONATE 650 MG TABLET 1300 MG: at 20:45

## 2019-01-01 RX ADMIN — SODIUM BICARBONATE 650 MG TABLET 650 MG: at 16:58

## 2019-01-01 RX ADMIN — LABETALOL HYDROCHLORIDE 300 MG: 100 TABLET, FILM COATED ORAL at 21:47

## 2019-01-01 RX ADMIN — PIPERACILLIN SODIUM,TAZOBACTAM SODIUM 2.25 G: 2; .25 INJECTION, POWDER, FOR SOLUTION INTRAVENOUS at 16:46

## 2019-01-01 RX ADMIN — LABETALOL HYDROCHLORIDE 300 MG: 100 TABLET, FILM COATED ORAL at 08:00

## 2019-01-01 RX ADMIN — GABAPENTIN 100 MG: 100 CAPSULE ORAL at 09:58

## 2019-01-01 RX ADMIN — Medication 1 CAPSULE: at 09:02

## 2019-01-01 RX ADMIN — OYSTER SHELL CALCIUM WITH VITAMIN D 1 TABLET: 500; 200 TABLET, FILM COATED ORAL at 14:46

## 2019-01-01 RX ADMIN — GABAPENTIN 100 MG: 100 CAPSULE ORAL at 21:16

## 2019-01-01 RX ADMIN — FAMOTIDINE 20 MG: 20 TABLET, FILM COATED ORAL at 21:53

## 2019-01-01 RX ADMIN — PIPERACILLIN SODIUM,TAZOBACTAM SODIUM 2.25 G: 2; .25 INJECTION, POWDER, FOR SOLUTION INTRAVENOUS at 17:16

## 2019-01-01 RX ADMIN — INSULIN ASPART 1 UNITS: 100 INJECTION, SOLUTION INTRAVENOUS; SUBCUTANEOUS at 12:48

## 2019-01-01 RX ADMIN — TACROLIMUS 2 MG: 1 CAPSULE ORAL at 22:32

## 2019-01-01 RX ADMIN — LEVOTHYROXINE SODIUM 50 MCG: 50 TABLET ORAL at 06:39

## 2019-01-01 RX ADMIN — SODIUM BICARBONATE 650 MG TABLET 650 MG: at 08:43

## 2019-01-01 RX ADMIN — PIPERACILLIN SODIUM,TAZOBACTAM SODIUM 2.25 G: 2; .25 INJECTION, POWDER, FOR SOLUTION INTRAVENOUS at 01:08

## 2019-01-01 RX ADMIN — OYSTER SHELL CALCIUM WITH VITAMIN D 1 TABLET: 500; 200 TABLET, FILM COATED ORAL at 09:34

## 2019-01-01 RX ADMIN — CEFAZOLIN 1 G: 1 INJECTION, POWDER, FOR SOLUTION INTRAMUSCULAR; INTRAVENOUS at 04:43

## 2019-01-01 RX ADMIN — LABETALOL HYDROCHLORIDE 150 MG: 100 TABLET, FILM COATED ORAL at 08:43

## 2019-01-01 RX ADMIN — LOPERAMIDE HYDROCHLORIDE 2 MG: 2 CAPSULE ORAL at 13:41

## 2019-01-01 RX ADMIN — PIPERACILLIN SODIUM,TAZOBACTAM SODIUM 2.25 G: 2; .25 INJECTION, POWDER, FOR SOLUTION INTRAVENOUS at 02:00

## 2019-01-01 RX ADMIN — CEFTRIAXONE SODIUM 1 G: 1 INJECTION, POWDER, FOR SOLUTION INTRAMUSCULAR; INTRAVENOUS at 15:47

## 2019-01-01 RX ADMIN — PRAVASTATIN SODIUM 10 MG: 10 TABLET ORAL at 19:47

## 2019-01-01 RX ADMIN — PIPERACILLIN SODIUM AND TAZOBACTAM SODIUM 3.38 G: 3; .375 INJECTION, POWDER, LYOPHILIZED, FOR SOLUTION INTRAVENOUS at 18:22

## 2019-01-01 RX ADMIN — TACROLIMUS 2 MG: 1 CAPSULE ORAL at 21:14

## 2019-01-01 RX ADMIN — GABAPENTIN 300 MG: 300 CAPSULE ORAL at 21:14

## 2019-01-01 RX ADMIN — INSULIN ASPART 1 UNITS: 100 INJECTION, SOLUTION INTRAVENOUS; SUBCUTANEOUS at 13:31

## 2019-01-01 RX ADMIN — INSULIN ASPART 1 UNITS: 100 INJECTION, SOLUTION INTRAVENOUS; SUBCUTANEOUS at 09:05

## 2019-01-01 RX ADMIN — HYDROCORTISONE SODIUM SUCCINATE 50 MG: 100 INJECTION, POWDER, FOR SOLUTION INTRAMUSCULAR; INTRAVENOUS at 20:32

## 2019-01-01 RX ADMIN — PANTOPRAZOLE SODIUM 40 MG: 40 INJECTION, POWDER, FOR SOLUTION INTRAVENOUS at 09:53

## 2019-01-01 RX ADMIN — PRAVASTATIN SODIUM 10 MG: 10 TABLET ORAL at 08:01

## 2019-01-01 RX ADMIN — LEVOTHYROXINE SODIUM ANHYDROUS 25 MCG: 100 INJECTION, POWDER, LYOPHILIZED, FOR SOLUTION INTRAVENOUS at 18:57

## 2019-01-01 RX ADMIN — INSULIN ASPART 1 UNITS: 100 INJECTION, SOLUTION INTRAVENOUS; SUBCUTANEOUS at 09:22

## 2019-01-01 RX ADMIN — LABETALOL HYDROCHLORIDE 150 MG: 100 TABLET, FILM COATED ORAL at 09:53

## 2019-01-01 RX ADMIN — PIPERACILLIN SODIUM,TAZOBACTAM SODIUM 2.25 G: 2; .25 INJECTION, POWDER, FOR SOLUTION INTRAVENOUS at 10:00

## 2019-01-01 RX ADMIN — SODIUM BICARBONATE: 84 INJECTION, SOLUTION INTRAVENOUS at 02:38

## 2019-01-01 RX ADMIN — TACROLIMUS 2 MG: 1 CAPSULE ORAL at 21:10

## 2019-01-01 RX ADMIN — AMLODIPINE BESYLATE 10 MG: 10 TABLET ORAL at 09:03

## 2019-01-01 RX ADMIN — LABETALOL HYDROCHLORIDE 300 MG: 100 TABLET, FILM COATED ORAL at 21:54

## 2019-01-01 RX ADMIN — TACROLIMUS 2 MG: 1 CAPSULE ORAL at 21:54

## 2019-01-01 RX ADMIN — SODIUM BICARBONATE 650 MG TABLET 1300 MG: at 09:34

## 2019-01-01 RX ADMIN — PRAVASTATIN SODIUM 10 MG: 10 TABLET ORAL at 09:06

## 2019-01-01 RX ADMIN — LOPERAMIDE HYDROCHLORIDE 2 MG: 2 CAPSULE ORAL at 21:11

## 2019-01-01 RX ADMIN — TACROLIMUS 2 MG: 1 CAPSULE ORAL at 22:43

## 2019-01-01 RX ADMIN — PIPERACILLIN SODIUM,TAZOBACTAM SODIUM 2.25 G: 2; .25 INJECTION, POWDER, FOR SOLUTION INTRAVENOUS at 14:36

## 2019-01-01 RX ADMIN — PIPERACILLIN SODIUM AND TAZOBACTAM SODIUM 3.38 G: 3; .375 INJECTION, POWDER, LYOPHILIZED, FOR SOLUTION INTRAVENOUS at 02:11

## 2019-01-01 RX ADMIN — PRAZOSIN HYDROCHLORIDE 2 MG: 2 CAPSULE ORAL at 21:28

## 2019-01-01 RX ADMIN — LEVOTHYROXINE SODIUM 50 MCG: 50 TABLET ORAL at 05:12

## 2019-01-01 RX ADMIN — PRAVASTATIN SODIUM 10 MG: 10 TABLET ORAL at 12:40

## 2019-01-01 RX ADMIN — MICAFUNGIN SODIUM 100 MG: 10 INJECTION, POWDER, LYOPHILIZED, FOR SOLUTION INTRAVENOUS at 11:13

## 2019-01-01 RX ADMIN — INSULIN GLARGINE 13 UNITS: 100 INJECTION, SOLUTION SUBCUTANEOUS at 22:00

## 2019-01-01 RX ADMIN — PRAVASTATIN SODIUM 10 MG: 10 TABLET ORAL at 19:43

## 2019-01-01 RX ADMIN — Medication 1 CAPSULE: at 21:14

## 2019-01-01 RX ADMIN — LABETALOL HYDROCHLORIDE 150 MG: 100 TABLET, FILM COATED ORAL at 08:45

## 2019-01-01 RX ADMIN — LABETALOL HYDROCHLORIDE 300 MG: 100 TABLET, FILM COATED ORAL at 21:09

## 2019-01-01 RX ADMIN — SODIUM BICARBONATE: 84 INJECTION, SOLUTION INTRAVENOUS at 01:46

## 2019-01-01 RX ADMIN — PIPERACILLIN SODIUM,TAZOBACTAM SODIUM 2.25 G: 2; .25 INJECTION, POWDER, FOR SOLUTION INTRAVENOUS at 19:47

## 2019-01-01 RX ADMIN — PRAVASTATIN SODIUM 10 MG: 10 TABLET ORAL at 21:58

## 2019-01-01 RX ADMIN — FAMOTIDINE 20 MG: 20 TABLET, FILM COATED ORAL at 09:06

## 2019-01-01 RX ADMIN — TACROLIMUS 2 MG: 1 CAPSULE ORAL at 12:40

## 2019-01-01 RX ADMIN — PIPERACILLIN SODIUM,TAZOBACTAM SODIUM 2.25 G: 2; .25 INJECTION, POWDER, FOR SOLUTION INTRAVENOUS at 18:22

## 2019-01-01 RX ADMIN — Medication 1 CAPSULE: at 08:02

## 2019-01-01 RX ADMIN — LABETALOL HYDROCHLORIDE 300 MG: 100 TABLET, FILM COATED ORAL at 09:06

## 2019-01-01 RX ADMIN — LEVOTHYROXINE SODIUM 50 MCG: 50 TABLET ORAL at 09:07

## 2019-01-01 RX ADMIN — INSULIN ASPART 2 UNITS: 100 INJECTION, SOLUTION INTRAVENOUS; SUBCUTANEOUS at 09:25

## 2019-01-01 RX ADMIN — LOPERAMIDE HYDROCHLORIDE 2 MG: 2 CAPSULE ORAL at 19:40

## 2019-01-01 RX ADMIN — LABETALOL HYDROCHLORIDE 300 MG: 100 TABLET, FILM COATED ORAL at 09:03

## 2019-01-01 RX ADMIN — INSULIN ASPART 2 UNITS: 100 INJECTION, SOLUTION INTRAVENOUS; SUBCUTANEOUS at 21:21

## 2019-01-01 RX ADMIN — ASPIRIN 81 MG 81 MG: 81 TABLET ORAL at 19:47

## 2019-01-01 RX ADMIN — OYSTER SHELL CALCIUM WITH VITAMIN D 1 TABLET: 500; 200 TABLET, FILM COATED ORAL at 12:40

## 2019-01-01 RX ADMIN — SODIUM CHLORIDE: 9 INJECTION, SOLUTION INTRAVENOUS at 17:45

## 2019-01-01 RX ADMIN — FAMOTIDINE 20 MG: 20 TABLET, FILM COATED ORAL at 21:27

## 2019-01-01 RX ADMIN — PIPERACILLIN SODIUM,TAZOBACTAM SODIUM 2.25 G: 2; .25 INJECTION, POWDER, FOR SOLUTION INTRAVENOUS at 10:33

## 2019-01-01 RX ADMIN — TACROLIMUS 2 MG: 1 CAPSULE ORAL at 21:16

## 2019-01-01 RX ADMIN — INSULIN GLARGINE 18 UNITS: 100 INJECTION, SOLUTION SUBCUTANEOUS at 21:54

## 2019-01-01 RX ADMIN — OYSTER SHELL CALCIUM WITH VITAMIN D 1 TABLET: 500; 200 TABLET, FILM COATED ORAL at 16:48

## 2019-01-01 RX ADMIN — LIDOCAINE HYDROCHLORIDE 1 ML: 10 INJECTION, SOLUTION EPIDURAL; INFILTRATION; INTRACAUDAL; PERINEURAL at 13:18

## 2019-01-01 RX ADMIN — PRAVASTATIN SODIUM 10 MG: 10 TABLET ORAL at 09:02

## 2019-01-01 RX ADMIN — INSULIN ASPART 2 UNITS: 100 INJECTION, SOLUTION INTRAVENOUS; SUBCUTANEOUS at 17:53

## 2019-01-01 RX ADMIN — GABAPENTIN 100 MG: 100 CAPSULE ORAL at 21:10

## 2019-01-01 RX ADMIN — LABETALOL HYDROCHLORIDE 300 MG: 100 TABLET, FILM COATED ORAL at 08:43

## 2019-01-01 RX ADMIN — PIPERACILLIN SODIUM,TAZOBACTAM SODIUM 2.25 G: 2; .25 INJECTION, POWDER, FOR SOLUTION INTRAVENOUS at 08:47

## 2019-01-01 RX ADMIN — ASPIRIN 81 MG 81 MG: 81 TABLET ORAL at 16:20

## 2019-01-01 RX ADMIN — AMLODIPINE BESYLATE 10 MG: 10 TABLET ORAL at 09:53

## 2019-01-01 RX ADMIN — Medication 1 CAPSULE: at 17:50

## 2019-01-01 RX ADMIN — TACROLIMUS 2 MG: 1 CAPSULE ORAL at 09:07

## 2019-01-01 RX ADMIN — LABETALOL HYDROCHLORIDE 300 MG: 100 TABLET, FILM COATED ORAL at 09:36

## 2019-01-01 RX ADMIN — OYSTER SHELL CALCIUM WITH VITAMIN D 1 TABLET: 500; 200 TABLET, FILM COATED ORAL at 12:12

## 2019-01-01 RX ADMIN — LEVOTHYROXINE SODIUM 50 MCG: 50 TABLET ORAL at 06:58

## 2019-01-01 RX ADMIN — INSULIN GLARGINE 20 UNITS: 100 INJECTION, SOLUTION SUBCUTANEOUS at 21:16

## 2019-01-01 RX ADMIN — INSULIN ASPART 2 UNITS: 100 INJECTION, SOLUTION INTRAVENOUS; SUBCUTANEOUS at 16:45

## 2019-01-01 RX ADMIN — PIPERACILLIN SODIUM AND TAZOBACTAM SODIUM 3.38 G: 3; .375 INJECTION, POWDER, LYOPHILIZED, FOR SOLUTION INTRAVENOUS at 09:09

## 2019-01-01 RX ADMIN — OYSTER SHELL CALCIUM WITH VITAMIN D 1 TABLET: 500; 200 TABLET, FILM COATED ORAL at 12:38

## 2019-01-01 RX ADMIN — OYSTER SHELL CALCIUM WITH VITAMIN D 1 TABLET: 500; 200 TABLET, FILM COATED ORAL at 10:08

## 2019-01-01 RX ADMIN — Medication 1 CAPSULE: at 12:40

## 2019-01-01 RX ADMIN — TACROLIMUS 2 MG: 1 CAPSULE ORAL at 08:25

## 2019-01-01 RX ADMIN — Medication 1 CAPSULE: at 21:09

## 2019-01-01 RX ADMIN — TACROLIMUS 2 MG: 1 CAPSULE ORAL at 08:44

## 2019-01-01 RX ADMIN — PRAVASTATIN SODIUM 10 MG: 10 TABLET ORAL at 22:44

## 2019-01-01 RX ADMIN — SODIUM CHLORIDE: 9 INJECTION, SOLUTION INTRAVENOUS at 01:00

## 2019-01-01 RX ADMIN — LABETALOL HYDROCHLORIDE 10 MG: 5 INJECTION INTRAVENOUS at 09:52

## 2019-01-01 RX ADMIN — INSULIN ASPART 1 UNITS: 100 INJECTION, SOLUTION INTRAVENOUS; SUBCUTANEOUS at 18:05

## 2019-01-01 RX ADMIN — FAMOTIDINE 20 MG: 20 TABLET, FILM COATED ORAL at 21:11

## 2019-01-01 RX ADMIN — PIPERACILLIN SODIUM,TAZOBACTAM SODIUM 2.25 G: 2; .25 INJECTION, POWDER, FOR SOLUTION INTRAVENOUS at 01:29

## 2019-01-01 RX ADMIN — INSULIN ASPART 2 UNITS: 100 INJECTION, SOLUTION INTRAVENOUS; SUBCUTANEOUS at 12:12

## 2019-01-01 RX ADMIN — ASPIRIN 81 MG 81 MG: 81 TABLET ORAL at 22:44

## 2019-01-01 RX ADMIN — PRAZOSIN HYDROCHLORIDE 2 MG: 2 CAPSULE ORAL at 21:11

## 2019-01-01 RX ADMIN — AMLODIPINE BESYLATE 10 MG: 10 TABLET ORAL at 08:39

## 2019-01-01 RX ADMIN — ASPIRIN 81 MG 81 MG: 81 TABLET ORAL at 09:35

## 2019-01-01 RX ADMIN — CEFAZOLIN SODIUM 2 G: 2 INJECTION, SOLUTION INTRAVENOUS at 23:03

## 2019-01-01 RX ADMIN — LEVOTHYROXINE SODIUM 50 MCG: 50 TABLET ORAL at 08:45

## 2019-01-01 RX ADMIN — INSULIN GLARGINE 20 UNITS: 100 INJECTION, SOLUTION SUBCUTANEOUS at 21:56

## 2019-01-01 RX ADMIN — LEVOTHYROXINE SODIUM 50 MCG: 50 TABLET ORAL at 06:42

## 2019-01-01 RX ADMIN — PIPERACILLIN SODIUM AND TAZOBACTAM SODIUM 3.38 G: 3; .375 INJECTION, POWDER, LYOPHILIZED, FOR SOLUTION INTRAVENOUS at 01:43

## 2019-01-01 RX ADMIN — PIPERACILLIN SODIUM,TAZOBACTAM SODIUM 2.25 G: 2; .25 INJECTION, POWDER, FOR SOLUTION INTRAVENOUS at 09:07

## 2019-01-01 RX ADMIN — LEVOTHYROXINE SODIUM 50 MCG: 50 TABLET ORAL at 09:03

## 2019-01-01 RX ADMIN — PRAZOSIN HYDROCHLORIDE 2 MG: 2 CAPSULE ORAL at 21:47

## 2019-01-01 RX ADMIN — Medication 1 CAPSULE: at 16:47

## 2019-01-01 RX ADMIN — LOPERAMIDE HYDROCHLORIDE 2 MG: 2 CAPSULE ORAL at 11:52

## 2019-01-01 RX ADMIN — AMLODIPINE BESYLATE 10 MG: 10 TABLET ORAL at 09:06

## 2019-01-01 RX ADMIN — LABETALOL HYDROCHLORIDE 300 MG: 100 TABLET, FILM COATED ORAL at 09:58

## 2019-01-01 RX ADMIN — INSULIN ASPART 2 UNITS: 100 INJECTION, SOLUTION INTRAVENOUS; SUBCUTANEOUS at 08:01

## 2019-01-01 RX ADMIN — PREDNISONE 5 MG: 5 TABLET ORAL at 08:25

## 2019-01-01 RX ADMIN — GABAPENTIN 100 MG: 100 CAPSULE ORAL at 08:43

## 2019-01-01 RX ADMIN — TACROLIMUS 2 MG: 1 CAPSULE ORAL at 08:39

## 2019-01-01 RX ADMIN — INSULIN GLARGINE 20 UNITS: 100 INJECTION, SOLUTION SUBCUTANEOUS at 21:48

## 2019-01-01 RX ADMIN — ASPIRIN 81 MG 81 MG: 81 TABLET ORAL at 08:01

## 2019-01-01 RX ADMIN — Medication 1 CAPSULE: at 16:20

## 2019-01-01 RX ADMIN — ASPIRIN 81 MG 81 MG: 81 TABLET ORAL at 09:23

## 2019-01-01 RX ADMIN — LIDOCAINE HYDROCHLORIDE 1 ML: 10 INJECTION, SOLUTION EPIDURAL; INFILTRATION; INTRACAUDAL; PERINEURAL at 11:49

## 2019-01-01 RX ADMIN — SODIUM BICARBONATE 650 MG TABLET 1300 MG: at 21:14

## 2019-01-01 RX ADMIN — LOPERAMIDE HYDROCHLORIDE 2 MG: 2 CAPSULE ORAL at 13:54

## 2019-01-01 RX ADMIN — OYSTER SHELL CALCIUM WITH VITAMIN D 1 TABLET: 500; 200 TABLET, FILM COATED ORAL at 09:24

## 2019-01-01 RX ADMIN — Medication 1 CAPSULE: at 09:35

## 2019-01-01 RX ADMIN — CEFAZOLIN SODIUM 2 G: 2 INJECTION, SOLUTION INTRAVENOUS at 08:14

## 2019-01-01 RX ADMIN — PIPERACILLIN SODIUM AND TAZOBACTAM SODIUM 3.38 G: 3; .375 INJECTION, POWDER, LYOPHILIZED, FOR SOLUTION INTRAVENOUS at 08:27

## 2019-01-01 RX ADMIN — INSULIN ASPART 1 UNITS: 100 INJECTION, SOLUTION INTRAVENOUS; SUBCUTANEOUS at 19:54

## 2019-01-01 RX ADMIN — GABAPENTIN 100 MG: 100 CAPSULE ORAL at 16:14

## 2019-01-01 RX ADMIN — LABETALOL HYDROCHLORIDE 10 MG: 5 INJECTION INTRAVENOUS at 14:38

## 2019-01-01 RX ADMIN — PANTOPRAZOLE SODIUM 40 MG: 40 INJECTION, POWDER, FOR SOLUTION INTRAVENOUS at 17:15

## 2019-01-01 RX ADMIN — CEFAZOLIN SODIUM 2 G: 2 INJECTION, SOLUTION INTRAVENOUS at 05:12

## 2019-01-01 RX ADMIN — PRAZOSIN HYDROCHLORIDE 2 MG: 2 CAPSULE ORAL at 21:58

## 2019-01-01 RX ADMIN — PRAVASTATIN SODIUM 10 MG: 10 TABLET ORAL at 09:23

## 2019-01-01 RX ADMIN — OYSTER SHELL CALCIUM WITH VITAMIN D 1 TABLET: 500; 200 TABLET, FILM COATED ORAL at 18:27

## 2019-01-01 RX ADMIN — Medication 1 CAPSULE: at 21:54

## 2019-01-01 RX ADMIN — SODIUM BICARBONATE 650 MG TABLET 1300 MG: at 08:01

## 2019-01-01 RX ADMIN — PIPERACILLIN SODIUM,TAZOBACTAM SODIUM 2.25 G: 2; .25 INJECTION, POWDER, FOR SOLUTION INTRAVENOUS at 17:50

## 2019-01-01 RX ADMIN — LABETALOL HYDROCHLORIDE 300 MG: 100 TABLET, FILM COATED ORAL at 08:25

## 2019-01-01 RX ADMIN — PRAZOSIN HYDROCHLORIDE 2 MG: 2 CAPSULE ORAL at 21:17

## 2019-01-01 RX ADMIN — CEFAZOLIN SODIUM 2 G: 2 INJECTION, SOLUTION INTRAVENOUS at 16:22

## 2019-01-01 RX ADMIN — ASPIRIN 81 MG 81 MG: 81 TABLET ORAL at 21:58

## 2019-01-01 RX ADMIN — PREDNISONE 5 MG: 5 TABLET ORAL at 09:37

## 2019-01-01 RX ADMIN — INSULIN ASPART 3 UNITS: 100 INJECTION, SOLUTION INTRAVENOUS; SUBCUTANEOUS at 17:51

## 2019-01-01 RX ADMIN — TACROLIMUS 2 MG: 1 CAPSULE ORAL at 09:01

## 2019-01-01 RX ADMIN — SODIUM BICARBONATE 650 MG TABLET 1300 MG: at 21:56

## 2019-01-01 RX ADMIN — SODIUM BICARBONATE 650 MG TABLET 1300 MG: at 09:07

## 2019-01-01 RX ADMIN — GABAPENTIN 100 MG: 100 CAPSULE ORAL at 21:47

## 2019-01-01 RX ADMIN — AMLODIPINE BESYLATE 10 MG: 10 TABLET ORAL at 08:45

## 2019-01-01 RX ADMIN — SODIUM CHLORIDE: 9 INJECTION, SOLUTION INTRAVENOUS at 15:46

## 2019-01-01 RX ADMIN — PIPERACILLIN SODIUM,TAZOBACTAM SODIUM 2.25 G: 2; .25 INJECTION, POWDER, FOR SOLUTION INTRAVENOUS at 09:39

## 2019-01-01 RX ADMIN — AMLODIPINE BESYLATE 10 MG: 10 TABLET ORAL at 10:08

## 2019-01-01 RX ADMIN — TACROLIMUS 2 MG: 1 CAPSULE ORAL at 09:37

## 2019-01-01 RX ADMIN — OYSTER SHELL CALCIUM WITH VITAMIN D 1 TABLET: 500; 200 TABLET, FILM COATED ORAL at 17:02

## 2019-01-01 RX ADMIN — LABETALOL HYDROCHLORIDE 10 MG: 5 INJECTION INTRAVENOUS at 02:29

## 2019-01-01 RX ADMIN — PREDNISONE 10 MG: 10 TABLET ORAL at 08:00

## 2019-01-01 RX ADMIN — LEVOTHYROXINE SODIUM 50 MCG: 50 TABLET ORAL at 07:19

## 2019-01-01 RX ADMIN — INSULIN GLARGINE 20 UNITS: 100 INJECTION, SOLUTION SUBCUTANEOUS at 21:12

## 2019-01-01 RX ADMIN — PREDNISONE 5 MG: 5 TABLET ORAL at 09:34

## 2019-01-01 RX ADMIN — OYSTER SHELL CALCIUM WITH VITAMIN D 1 TABLET: 500; 200 TABLET, FILM COATED ORAL at 17:28

## 2019-01-01 RX ADMIN — AMLODIPINE BESYLATE 10 MG: 10 TABLET ORAL at 08:25

## 2019-01-01 RX ADMIN — PREDNISONE 5 MG: 5 TABLET ORAL at 09:58

## 2019-01-01 RX ADMIN — MICAFUNGIN SODIUM 100 MG: 10 INJECTION, POWDER, LYOPHILIZED, FOR SOLUTION INTRAVENOUS at 12:41

## 2019-01-01 RX ADMIN — SODIUM BICARBONATE 650 MG TABLET 1300 MG: at 21:09

## 2019-01-01 RX ADMIN — SODIUM BICARBONATE: 84 INJECTION, SOLUTION INTRAVENOUS at 12:39

## 2019-01-01 RX ADMIN — PREDNISONE 5 MG: 5 TABLET ORAL at 08:45

## 2019-01-01 RX ADMIN — CEFAZOLIN SODIUM 2 G: 2 INJECTION, SOLUTION INTRAVENOUS at 12:10

## 2019-01-01 RX ADMIN — LABETALOL HYDROCHLORIDE 300 MG: 100 TABLET, FILM COATED ORAL at 21:55

## 2019-01-01 RX ADMIN — INSULIN ASPART 2 UNITS: 100 INJECTION, SOLUTION INTRAVENOUS; SUBCUTANEOUS at 14:46

## 2019-01-01 RX ADMIN — MICAFUNGIN SODIUM 100 MG: 10 INJECTION, POWDER, LYOPHILIZED, FOR SOLUTION INTRAVENOUS at 12:12

## 2019-01-01 RX ADMIN — Medication 1 CAPSULE: at 09:07

## 2019-01-01 RX ADMIN — SODIUM CHLORIDE: 9 INJECTION, SOLUTION INTRAVENOUS at 05:37

## 2019-01-01 RX ADMIN — PIPERACILLIN SODIUM AND TAZOBACTAM SODIUM 3.38 G: 3; .375 INJECTION, POWDER, LYOPHILIZED, FOR SOLUTION INTRAVENOUS at 14:42

## 2019-01-01 RX ADMIN — INSULIN GLARGINE 10 UNITS: 100 INJECTION, SOLUTION SUBCUTANEOUS at 22:04

## 2019-01-01 RX ADMIN — INSULIN ASPART 2 UNITS: 100 INJECTION, SOLUTION INTRAVENOUS; SUBCUTANEOUS at 22:02

## 2019-01-01 RX ADMIN — CEFAZOLIN SODIUM 2 G: 2 INJECTION, SOLUTION INTRAVENOUS at 16:40

## 2019-01-01 RX ADMIN — PIPERACILLIN SODIUM AND TAZOBACTAM SODIUM 3.38 G: 3; .375 INJECTION, POWDER, LYOPHILIZED, FOR SOLUTION INTRAVENOUS at 02:26

## 2019-01-01 RX ADMIN — INSULIN GLARGINE 10 UNITS: 100 INJECTION, SOLUTION SUBCUTANEOUS at 21:16

## 2019-01-01 RX ADMIN — HYDROCORTISONE SODIUM SUCCINATE 50 MG: 100 INJECTION, POWDER, FOR SOLUTION INTRAMUSCULAR; INTRAVENOUS at 13:32

## 2019-01-01 RX ADMIN — LABETALOL HYDROCHLORIDE 300 MG: 100 TABLET, FILM COATED ORAL at 21:57

## 2019-01-01 RX ADMIN — HYDROCORTISONE SODIUM SUCCINATE 50 MG: 100 INJECTION, POWDER, FOR SOLUTION INTRAMUSCULAR; INTRAVENOUS at 12:40

## 2019-01-01 RX ADMIN — SODIUM CHLORIDE: 9 INJECTION, SOLUTION INTRAVENOUS at 12:32

## 2019-01-01 RX ADMIN — GABAPENTIN 100 MG: 100 CAPSULE ORAL at 08:40

## 2019-01-01 RX ADMIN — SODIUM CHLORIDE: 9 INJECTION, SOLUTION INTRAVENOUS at 17:56

## 2019-01-01 RX ADMIN — FAMOTIDINE 20 MG: 20 TABLET, FILM COATED ORAL at 09:23

## 2019-01-01 RX ADMIN — LABETALOL HYDROCHLORIDE 10 MG: 5 INJECTION INTRAVENOUS at 20:33

## 2019-01-01 RX ADMIN — GABAPENTIN 300 MG: 300 CAPSULE ORAL at 21:09

## 2019-01-01 RX ADMIN — LEVOTHYROXINE SODIUM 50 MCG: 50 TABLET ORAL at 06:37

## 2019-01-01 RX ADMIN — PIPERACILLIN SODIUM,TAZOBACTAM SODIUM 2.25 G: 2; .25 INJECTION, POWDER, FOR SOLUTION INTRAVENOUS at 08:39

## 2019-01-01 RX ADMIN — FAMOTIDINE 20 MG: 20 TABLET, FILM COATED ORAL at 21:17

## 2019-01-01 RX ADMIN — TACROLIMUS 2 MG: 1 CAPSULE ORAL at 09:24

## 2019-01-01 RX ADMIN — CEFAZOLIN SODIUM 2 G: 2 INJECTION, SOLUTION INTRAVENOUS at 20:57

## 2019-01-01 RX ADMIN — SODIUM BICARBONATE 650 MG TABLET 1300 MG: at 12:39

## 2019-01-01 RX ADMIN — SODIUM CHLORIDE: 9 INJECTION, SOLUTION INTRAVENOUS at 03:58

## 2019-01-01 RX ADMIN — LEVOTHYROXINE SODIUM 50 MCG: 50 TABLET ORAL at 08:01

## 2019-01-01 RX ADMIN — PIPERACILLIN SODIUM AND TAZOBACTAM SODIUM 3.38 G: 3; .375 INJECTION, POWDER, LYOPHILIZED, FOR SOLUTION INTRAVENOUS at 21:16

## 2019-01-01 RX ADMIN — PREDNISONE 5 MG: 5 TABLET ORAL at 09:03

## 2019-01-01 RX ADMIN — LABETALOL HYDROCHLORIDE 300 MG: 100 TABLET, FILM COATED ORAL at 21:11

## 2019-01-01 RX ADMIN — TACROLIMUS 2 MG: 1 CAPSULE ORAL at 08:00

## 2019-01-01 RX ADMIN — PRAVASTATIN SODIUM 10 MG: 10 TABLET ORAL at 21:17

## 2019-01-01 RX ADMIN — GABAPENTIN 100 MG: 100 CAPSULE ORAL at 08:25

## 2019-01-01 RX ADMIN — LABETALOL HYDROCHLORIDE 300 MG: 100 TABLET, FILM COATED ORAL at 08:39

## 2019-01-01 RX ADMIN — SODIUM CHLORIDE: 9 INJECTION, SOLUTION INTRAVENOUS at 02:30

## 2019-01-01 RX ADMIN — CEFAZOLIN SODIUM 2 G: 2 INJECTION, SOLUTION INTRAVENOUS at 14:46

## 2019-01-01 RX ADMIN — INSULIN GLARGINE 20 UNITS: 100 INJECTION, SOLUTION SUBCUTANEOUS at 21:14

## 2019-01-01 RX ADMIN — FAMOTIDINE 20 MG: 20 TABLET, FILM COATED ORAL at 21:58

## 2019-01-01 RX ADMIN — LABETALOL HYDROCHLORIDE 300 MG: 100 TABLET, FILM COATED ORAL at 21:16

## 2019-01-01 RX ADMIN — PIPERACILLIN SODIUM AND TAZOBACTAM SODIUM 3.38 G: 3; .375 INJECTION, POWDER, LYOPHILIZED, FOR SOLUTION INTRAVENOUS at 13:55

## 2019-01-01 RX ADMIN — PREDNISONE 5 MG: 5 TABLET ORAL at 09:53

## 2019-01-01 RX ADMIN — TACROLIMUS 2 MG: 1 CAPSULE ORAL at 21:58

## 2019-01-01 RX ADMIN — SODIUM BICARBONATE 650 MG TABLET 650 MG: at 09:37

## 2019-01-01 RX ADMIN — Medication 1 CAPSULE: at 09:25

## 2019-01-01 RX ADMIN — ASPIRIN 81 MG 81 MG: 81 TABLET ORAL at 21:16

## 2019-01-01 RX ADMIN — TACROLIMUS 2 MG: 1 CAPSULE ORAL at 21:46

## 2019-01-01 RX ADMIN — PIPERACILLIN SODIUM AND TAZOBACTAM SODIUM 3.38 G: 3; .375 INJECTION, POWDER, LYOPHILIZED, FOR SOLUTION INTRAVENOUS at 08:00

## 2019-01-01 RX ADMIN — MICONAZOLE NITRATE: 20 POWDER TOPICAL at 09:39

## 2019-01-01 RX ADMIN — AMLODIPINE BESYLATE 10 MG: 10 TABLET ORAL at 09:37

## 2019-01-01 RX ADMIN — TACROLIMUS 2 MG: 1 CAPSULE ORAL at 08:43

## 2019-01-01 RX ADMIN — CEFAZOLIN SODIUM 2 G: 2 INJECTION, SOLUTION INTRAVENOUS at 03:37

## 2019-01-01 RX ADMIN — TACROLIMUS 2 MG: 1 CAPSULE ORAL at 09:53

## 2019-01-01 RX ADMIN — PIPERACILLIN SODIUM,TAZOBACTAM SODIUM 2.25 G: 2; .25 INJECTION, POWDER, FOR SOLUTION INTRAVENOUS at 01:37

## 2019-01-01 RX ADMIN — PREDNISONE 5 MG: 5 TABLET ORAL at 08:40

## 2019-01-01 RX ADMIN — LABETALOL HYDROCHLORIDE 300 MG: 100 TABLET, FILM COATED ORAL at 20:45

## 2019-01-01 RX ADMIN — GABAPENTIN 100 MG: 100 CAPSULE ORAL at 16:25

## 2019-01-01 RX ADMIN — CEFAZOLIN SODIUM 2 G: 2 INJECTION, SOLUTION INTRAVENOUS at 16:58

## 2019-01-01 RX ADMIN — SODIUM BICARBONATE 650 MG TABLET 650 MG: at 08:39

## 2019-01-01 RX ADMIN — SODIUM CHLORIDE: 9 INJECTION, SOLUTION INTRAVENOUS at 11:52

## 2019-01-01 RX ADMIN — INSULIN GLARGINE 20 UNITS: 100 INJECTION, SOLUTION SUBCUTANEOUS at 22:32

## 2019-01-01 RX ADMIN — LEVOTHYROXINE SODIUM 50 MCG: 50 TABLET ORAL at 06:45

## 2019-01-01 RX ADMIN — TACROLIMUS 2 MG: 1 CAPSULE ORAL at 21:04

## 2019-01-01 RX ADMIN — GABAPENTIN 100 MG: 100 CAPSULE ORAL at 16:31

## 2019-01-01 RX ADMIN — SODIUM BICARBONATE 650 MG TABLET 650 MG: at 08:25

## 2019-01-01 RX ADMIN — PIPERACILLIN SODIUM,TAZOBACTAM SODIUM 2.25 G: 2; .25 INJECTION, POWDER, FOR SOLUTION INTRAVENOUS at 17:28

## 2019-01-01 RX ADMIN — AMLODIPINE BESYLATE 10 MG: 10 TABLET ORAL at 08:44

## 2019-01-01 RX ADMIN — ASPIRIN 81 MG 81 MG: 81 TABLET ORAL at 09:03

## 2019-01-01 RX ADMIN — OYSTER SHELL CALCIUM WITH VITAMIN D 1 TABLET: 500; 200 TABLET, FILM COATED ORAL at 09:06

## 2019-01-01 RX ADMIN — LABETALOL HYDROCHLORIDE 150 MG: 100 TABLET, FILM COATED ORAL at 22:40

## 2019-01-01 RX ADMIN — INSULIN ASPART 1 UNITS: 100 INJECTION, SOLUTION INTRAVENOUS; SUBCUTANEOUS at 16:46

## 2019-01-01 RX ADMIN — LABETALOL HYDROCHLORIDE 300 MG: 100 TABLET, FILM COATED ORAL at 21:14

## 2019-01-01 RX ADMIN — Medication 1 CAPSULE: at 17:02

## 2019-01-01 RX ADMIN — INSULIN ASPART 2 UNITS: 100 INJECTION, SOLUTION INTRAVENOUS; SUBCUTANEOUS at 21:29

## 2019-01-01 ASSESSMENT — ACTIVITIES OF DAILY LIVING (ADL)
ADLS_ACUITY_SCORE: 29
FALL_HISTORY_WITHIN_LAST_SIX_MONTHS: NO
ADLS_ACUITY_SCORE: 31
TRANSFERRING: 3-->ASSISTIVE EQUIPMENT AND PERSON
ADLS_ACUITY_SCORE: 30
RETIRED_EATING: 0-->INDEPENDENT
ADLS_ACUITY_SCORE: 29
ADLS_ACUITY_SCORE: 32
ADLS_ACUITY_SCORE: 29
ADLS_ACUITY_SCORE: 34
ADLS_ACUITY_SCORE: 20.5
ADLS_ACUITY_SCORE: 32
ADLS_ACUITY_SCORE: 30
DRESS: 2-->ASSISTIVE PERSON
ADLS_ACUITY_SCORE: 32
ADLS_ACUITY_SCORE: 30
ADLS_ACUITY_SCORE: 34
ADLS_ACUITY_SCORE: 29
ADLS_ACUITY_SCORE: 32
ADLS_ACUITY_SCORE: 32
ADLS_ACUITY_SCORE: 34
BATHING: 2-->ASSISTIVE PERSON
ADLS_ACUITY_SCORE: 32
ADLS_ACUITY_SCORE: 31
WHICH_OF_THE_ABOVE_FUNCTIONAL_RISKS_HAD_A_RECENT_ONSET_OR_CHANGE?: COGNITION
ADLS_ACUITY_SCORE: 29
ADLS_ACUITY_SCORE: 32
ADLS_ACUITY_SCORE: 34
ADLS_ACUITY_SCORE: 30
COGNITION: 1 - ATTENTION OR MEMORY DEFICITS
ADLS_ACUITY_SCORE: 32
ADLS_ACUITY_SCORE: 29
ADLS_ACUITY_SCORE: 31
ADLS_ACUITY_SCORE: 32
ADLS_ACUITY_SCORE: 32
ADLS_ACUITY_SCORE: 30
ADLS_ACUITY_SCORE: 30
ADLS_ACUITY_SCORE: 29
ADLS_ACUITY_SCORE: 34
ADLS_ACUITY_SCORE: 29
ADLS_ACUITY_SCORE: 32
SWALLOWING: 0-->SWALLOWS FOODS/LIQUIDS WITHOUT DIFFICULTY
ADLS_ACUITY_SCORE: 32
ADLS_ACUITY_SCORE: 34
ADLS_ACUITY_SCORE: 34
ADLS_ACUITY_SCORE: 31
ADLS_ACUITY_SCORE: 32
ADLS_ACUITY_SCORE: 34
AMBULATION: 3-->ASSISTIVE EQUIPMENT AND PERSON
ADLS_ACUITY_SCORE: 32
ADLS_ACUITY_SCORE: 29
ADLS_ACUITY_SCORE: 29
ADLS_ACUITY_SCORE: 32
ADLS_ACUITY_SCORE: 34
ADLS_ACUITY_SCORE: 31
ADLS_ACUITY_SCORE: 32
ADLS_ACUITY_SCORE: 20.5
ADLS_ACUITY_SCORE: 29
ADLS_ACUITY_SCORE: 32
ADLS_ACUITY_SCORE: 32
ADLS_ACUITY_SCORE: 34
ADLS_ACUITY_SCORE: 29
ADLS_ACUITY_SCORE: 35
ADLS_ACUITY_SCORE: 32
ADLS_ACUITY_SCORE: 32
ADLS_ACUITY_SCORE: 29
ADLS_ACUITY_SCORE: 34
RETIRED_COMMUNICATION: 2-->DIFFICULTY UNDERSTANDING (NOT RELATED TO LANGUAGE BARRIER)
TOILETING: 3-->ASSISTIVE EQUIPMENT AND PERSON
ADLS_ACUITY_SCORE: 29
ADLS_ACUITY_SCORE: 32
ADLS_ACUITY_SCORE: 32
ADLS_ACUITY_SCORE: 31
ADLS_ACUITY_SCORE: 34
ADLS_ACUITY_SCORE: 32
ADLS_ACUITY_SCORE: 32
ADLS_ACUITY_SCORE: 20.5
ADLS_ACUITY_SCORE: 29
ADLS_ACUITY_SCORE: 20.5
ADLS_ACUITY_SCORE: 32
ADLS_ACUITY_SCORE: 32
ADLS_ACUITY_SCORE: 33
ADLS_ACUITY_SCORE: 33
ADLS_ACUITY_SCORE: 30
ADLS_ACUITY_SCORE: 32
ADLS_ACUITY_SCORE: 30
ADLS_ACUITY_SCORE: 32

## 2019-01-01 ASSESSMENT — ENCOUNTER SYMPTOMS
ABDOMINAL PAIN: 0
NAUSEA: 0

## 2019-01-01 ASSESSMENT — MIFFLIN-ST. JEOR
SCORE: 1640.75
SCORE: 1718.34
SCORE: 1673.89
SCORE: 1673.75
SCORE: 1613.12
SCORE: 1608.11
SCORE: 1632.75
SCORE: 1604.38
SCORE: 1623.75
SCORE: 1709.75
SCORE: 1581.37

## 2019-01-04 ENCOUNTER — PATIENT OUTREACH (OUTPATIENT)
Dept: CARE COORDINATION | Facility: CLINIC | Age: 74
End: 2019-01-04

## 2019-01-04 ENCOUNTER — TELEPHONE (OUTPATIENT)
Dept: FAMILY MEDICINE | Facility: CLINIC | Age: 74
End: 2019-01-04

## 2019-01-04 DIAGNOSIS — N39.0 URINARY TRACT INFECTION ASSOCIATED WITH INDWELLING URETHRAL CATHETER, SUBSEQUENT ENCOUNTER: Primary | ICD-10-CM

## 2019-01-04 DIAGNOSIS — T83.511D URINARY TRACT INFECTION ASSOCIATED WITH INDWELLING URETHRAL CATHETER, SUBSEQUENT ENCOUNTER: Primary | ICD-10-CM

## 2019-01-04 LAB
ALBUMIN UR-MCNC: 100 MG/DL
ANION GAP SERPL CALCULATED.3IONS-SCNC: 6 MMOL/L (ref 3–14)
APPEARANCE UR: ABNORMAL
BASOPHILS # BLD AUTO: 0 10E9/L (ref 0–0.2)
BASOPHILS NFR BLD AUTO: 0.4 %
BILIRUB UR QL STRIP: NEGATIVE
BUN SERPL-MCNC: 76 MG/DL (ref 7–30)
CALCIUM SERPL-MCNC: 8.3 MG/DL (ref 8.5–10.1)
CHLORIDE SERPL-SCNC: 108 MMOL/L (ref 94–109)
CO2 SERPL-SCNC: 23 MMOL/L (ref 20–32)
COLOR UR AUTO: ABNORMAL
CREAT SERPL-MCNC: 1.91 MG/DL (ref 0.52–1.04)
DIFFERENTIAL METHOD BLD: ABNORMAL
EOSINOPHIL # BLD AUTO: 0.3 10E9/L (ref 0–0.7)
EOSINOPHIL NFR BLD AUTO: 5.4 %
ERYTHROCYTE [DISTWIDTH] IN BLOOD BY AUTOMATED COUNT: 13.6 % (ref 10–15)
GFR SERPL CREATININE-BSD FRML MDRD: 26 ML/MIN/{1.73_M2}
GLUCOSE SERPL-MCNC: 131 MG/DL (ref 70–99)
GLUCOSE UR STRIP-MCNC: NEGATIVE MG/DL
HCT VFR BLD AUTO: 32.7 % (ref 35–47)
HGB BLD-MCNC: 10.1 G/DL (ref 11.7–15.7)
HGB UR QL STRIP: ABNORMAL
IMM GRANULOCYTES # BLD: 0 10E9/L (ref 0–0.4)
IMM GRANULOCYTES NFR BLD: 0.2 %
KETONES UR STRIP-MCNC: NEGATIVE MG/DL
LEUKOCYTE ESTERASE UR QL STRIP: ABNORMAL
LYMPHOCYTES # BLD AUTO: 1.8 10E9/L (ref 0.8–5.3)
LYMPHOCYTES NFR BLD AUTO: 31.9 %
MCH RBC QN AUTO: 28.8 PG (ref 26.5–33)
MCHC RBC AUTO-ENTMCNC: 30.9 G/DL (ref 31.5–36.5)
MCV RBC AUTO: 93 FL (ref 78–100)
MONOCYTES # BLD AUTO: 0.5 10E9/L (ref 0–1.3)
MONOCYTES NFR BLD AUTO: 9 %
MUCOUS THREADS #/AREA URNS LPF: PRESENT /LPF
NEUTROPHILS # BLD AUTO: 3 10E9/L (ref 1.6–8.3)
NEUTROPHILS NFR BLD AUTO: 53.1 %
NITRATE UR QL: NEGATIVE
NRBC # BLD AUTO: 0 10*3/UL
NRBC BLD AUTO-RTO: 0 /100
PH UR STRIP: 6 PH (ref 5–7)
PLATELET # BLD AUTO: 165 10E9/L (ref 150–450)
POTASSIUM SERPL-SCNC: 5.1 MMOL/L (ref 3.4–5.3)
RBC # BLD AUTO: 3.51 10E12/L (ref 3.8–5.2)
RBC #/AREA URNS AUTO: 91 /HPF (ref 0–2)
SODIUM SERPL-SCNC: 137 MMOL/L (ref 133–144)
SOURCE: ABNORMAL
SP GR UR STRIP: 1.01 (ref 1–1.03)
UROBILINOGEN UR STRIP-MCNC: NORMAL MG/DL (ref 0–2)
WBC # BLD AUTO: 5.6 10E9/L (ref 4–11)
WBC #/AREA URNS AUTO: >182 /HPF (ref 0–5)
WBC CLUMPS #/AREA URNS HPF: PRESENT /HPF
YEAST #/AREA URNS HPF: ABNORMAL /HPF

## 2019-01-04 PROCEDURE — 87088 URINE BACTERIA CULTURE: CPT | Performed by: FAMILY MEDICINE

## 2019-01-04 PROCEDURE — 87086 URINE CULTURE/COLONY COUNT: CPT | Performed by: FAMILY MEDICINE

## 2019-01-04 PROCEDURE — 81001 URINALYSIS AUTO W/SCOPE: CPT | Performed by: FAMILY MEDICINE

## 2019-01-04 PROCEDURE — 80197 ASSAY OF TACROLIMUS: CPT | Performed by: FAMILY MEDICINE

## 2019-01-04 PROCEDURE — 85025 COMPLETE CBC W/AUTO DIFF WBC: CPT | Performed by: FAMILY MEDICINE

## 2019-01-04 PROCEDURE — 87106 FUNGI IDENTIFICATION YEAST: CPT | Performed by: FAMILY MEDICINE

## 2019-01-04 PROCEDURE — 87186 SC STD MICRODIL/AGAR DIL: CPT | Performed by: FAMILY MEDICINE

## 2019-01-04 PROCEDURE — 80048 BASIC METABOLIC PNL TOTAL CA: CPT | Performed by: FAMILY MEDICINE

## 2019-01-04 NOTE — TELEPHONE ENCOUNTER
Home Health Certification and Plan of Care faxed to [facility] at [phone].  Cecilia Hernandez MA

## 2019-01-04 NOTE — PROGRESS NOTES
Clinic Care Coordination Contact  Care Team Conversations    Received call from home care nurse. Went for home visit today. Did obtain urine culture for recheck of treatment of UTI and dropped off at clinic. Patient's bp today 186/85 today. Has not taken any blood pressure medications today. For the past 4 days patient has reported bp's systolic ranging from 150's-200's. Usually checks bs's once daily (although has not checked yet today) with patient reporting recently greater than 250's. Patient historically non-compliant with blood pressure medications and yet is quite concerned about the readings and will end up checking 4-5 times per day. Nurse has instructed patient to take medications now. Would like PCP to assign bp parameters for when to report numbers to clinic and any other plan suggested. Patient doesn't feel well in general but did not report signs of MI or CVA to home care nurse. Reviewed with Jayashree JONES CNP for plan. No changes today. Take medications as directed. To ED with worsening or signs or symptoms of MI/CVA that are reviewed with patient by home care nurse. Will send msg to PCP for bp parameters as above.     Porsha Khalil R.N.  Clinic Care Coordinator  [facility]  [phone]

## 2019-01-05 LAB
TACROLIMUS BLD-MCNC: 3.9 UG/L (ref 5–15)
TME LAST DOSE: ABNORMAL H

## 2019-01-06 LAB
BACTERIA SPEC CULT: ABNORMAL
BACTERIA SPEC CULT: ABNORMAL
Lab: ABNORMAL
SPECIMEN SOURCE: ABNORMAL

## 2019-01-09 NOTE — PROGRESS NOTES
Clinic Care Coordination Contact  Care Team Conversations    Per discussion with PCP Conchita, Pharm D, will address parameters with home care nurse.     Porsha Khalil R.N.  Clinic Care Coordinator  [facility]  [phone]

## 2019-01-10 ENCOUNTER — TELEPHONE (OUTPATIENT)
Dept: PHARMACY | Facility: CLINIC | Age: 74
End: 2019-01-10

## 2019-01-10 NOTE — TELEPHONE ENCOUNTER
Called pt for MTM visit; with insurance changes, unknown if MTM visits are covered and unable to verify insurance information today ( not home).  Briefly reviewed HTN and deferred telephone visit.     Pt is taking losartan 25mg daily. She is no longer taking any labetalol.    Patient does self-monitor BP. In the past 4 days, home BP monitoring in range of 140-160's systolic over 70-90's diastolic.  Prior to this, systolic BP ranged 150-200s (see RN notes).  Patient reports no current medication side effects.    Last Comprehensive Metabolic Panel:  Sodium   Date Value Ref Range Status   01/04/2019 137 133 - 144 mmol/L Final     Potassium   Date Value Ref Range Status   01/04/2019 5.1 3.4 - 5.3 mmol/L Final     Chloride   Date Value Ref Range Status   01/04/2019 108 94 - 109 mmol/L Final     Carbon Dioxide   Date Value Ref Range Status   01/04/2019 23 20 - 32 mmol/L Final     Anion Gap   Date Value Ref Range Status   01/04/2019 6 3 - 14 mmol/L Final     Glucose   Date Value Ref Range Status   01/04/2019 131 (H) 70 - 99 mg/dL Final     Urea Nitrogen   Date Value Ref Range Status   01/04/2019 76 (H) 7 - 30 mg/dL Final     Creatinine   Date Value Ref Range Status   01/04/2019 1.91 (H) 0.52 - 1.04 mg/dL Final     GFR Estimate   Date Value Ref Range Status   01/04/2019 26 (L) >60 mL/min/[1.73_m2] Final     Comment:     Non  GFR Calc  Starting 12/18/2018, serum creatinine based estimated GFR (eGFR) will be   calculated using the Chronic Kidney Disease Epidemiology Collaboration   (CKD-EPI) equation.       Calcium   Date Value Ref Range Status   01/04/2019 8.3 (L) 8.5 - 10.1 mg/dL Final     Plan:  Will outreach to transplant team - with rise in SCr, hesitant to further increase losartan.   FYI pt has quite a large supply of labetalol 300mg tablets. Historic difficulty following BP parameters and recommend scheduling any changes in therapy.     Conchita Toledo, PharmD, BCACP

## 2019-01-14 ENCOUNTER — VIRTUAL VISIT (OUTPATIENT)
Dept: FAMILY MEDICINE | Facility: CLINIC | Age: 74
End: 2019-01-14

## 2019-01-14 DIAGNOSIS — R19.7 DIARRHEA, UNSPECIFIED TYPE: ICD-10-CM

## 2019-01-14 DIAGNOSIS — I10 BENIGN ESSENTIAL HYPERTENSION: ICD-10-CM

## 2019-01-14 DIAGNOSIS — E11.42 TYPE 2 DIABETES MELLITUS WITH DIABETIC POLYNEUROPATHY, WITH LONG-TERM CURRENT USE OF INSULIN (H): Primary | ICD-10-CM

## 2019-01-14 DIAGNOSIS — Z94.0 KIDNEY REPLACED BY TRANSPLANT: ICD-10-CM

## 2019-01-14 DIAGNOSIS — N39.0 RECURRENT UTI: ICD-10-CM

## 2019-01-14 DIAGNOSIS — Z79.4 TYPE 2 DIABETES MELLITUS WITH DIABETIC POLYNEUROPATHY, WITH LONG-TERM CURRENT USE OF INSULIN (H): Primary | ICD-10-CM

## 2019-01-14 PROCEDURE — 98968 PH1 ASSMT&MGMT NQHP 21-30: CPT | Performed by: NURSE PRACTITIONER

## 2019-01-14 RX ORDER — LOSARTAN POTASSIUM 25 MG/1
25 TABLET ORAL DAILY
Qty: 30 TABLET | Refills: 3 | COMMUNITY
Start: 2019-01-14 | End: 2019-03-08

## 2019-01-14 RX ORDER — DIPHENOXYLATE HCL/ATROPINE 2.5-.025MG
1 TABLET ORAL 4 TIMES DAILY PRN
Qty: 60 TABLET | Refills: 1 | Status: ON HOLD | OUTPATIENT
Start: 2019-01-14 | End: 2019-03-05

## 2019-01-14 RX ORDER — LABETALOL 300 MG/1
150 TABLET, FILM COATED ORAL EVERY OTHER DAY
Qty: 180 TABLET | Refills: 3 | COMMUNITY
Start: 2019-01-14 | End: 2019-02-12

## 2019-01-14 NOTE — PROGRESS NOTES
"Gem Jade is a 73 year old female who is being evaluated via a billable telephone visit.      The patient has been notified of following:     \"This telephone visit will be conducted via a call between you and your physician/provider. We have found that certain health care needs can be provided without the need for a physical exam.  This service lets us provide the care you need with a short phone conversation.  If a prescription is necessary we can send it directly to your pharmacy.  If lab work is needed we can place an order for that and you can then stop by our lab to have the test done at a later time.    If during the course of the call the physician/provider feels a telephone visit is not appropriate, you will not be charged for this service.\"     Consent has been obtained for this service by 1 care team member: yes. See the scanned image in the medical record.    Gem Jade complains of    Chief Complaint   Patient presents with     Telephone       I have reviewed and updated the patient's Past Medical History, Social History, Family History and Medication List.    ALLERGIES  Bactrim ds [sulfamethoxazole w/trimethoprim]; Atorvastatin calcium; Codeine; Darvocet [propoxyphene n-apap]; Hydromorphone; Levofloxacin; Morphine; Niaspan [niacin]; Percocet [oxycodone-acetaminophen]; and Vicodin [hydrocodone-acetaminophen]    Peyton Raymond CMA   (MA signature)    Additional provider notes:   UTI - Female  Duration of complaint: 1 day , urine has sediments in there, cloudy, took a baclofen this am because she was having bladder spasms which is unusual for her usually only needs it when they change her cath, is due to cath change this week, worried about another infection, last UA/UC 1/4/2019 UA at baseline but UC not showing anything we would treat, no fever, no mental confusion, no pain just spasms x1    Mental Health Symptoms       Duration: at baseline, worries about her kidney     Description:  Depression: " no  Anxiety: YES- off and on over her DM and her kidney  Sleep problems: YES- chronic, unusual sleep patterns  Concentration problems: YES- off and on    Therapies tried and outcome: counseling off and on      See PHQ-9 and LISSY scores, as appropriate       Diabetes Follow-up        Patient is checking blood sugars:  only 3x daily at most      Blood sugar testing frequency justification: Uncontrolled DM, numbers have improved, lantus 22units at midnight  and sliding scale, taking her Novolog everyday at least 2x daily, noon and at midnight,        Results are as follows: high with UTI but now at baseline       fasting- 123,90,83    nonfasting - under 150's at baseline     Diabetic concerns: high with infection but now back down to baseline     Symptoms of hypoglycemia (low blood sugar): one time and had orange juice      Paresthesias (numbness or burning in feet) or sores: chronic neuropathy at baseline      Hypertension Follow-up        Outpatient blood pressures are being checked at home by [facility], being followed by her kidney transplant team, recent med change stopped labetalol and increased losartan to 25mg, see PharmD note from 1/10/19   Pt is taking losartan 25mg daily. She is no longer taking any labetalol.      Patient does self-monitor BP. In the past 4 days, home BP monitoring in range of 140-160's systolic over 70-90's diastolic.  Prior to this, systolic BP ranged 140's-180's, diastolic 60's- one high number 109,      Low Salt Diet: no added salt     Diarrhea  Duration of complaint: Started with antibiotic, requesting lomotil     Dental Issues:  2 teeth fell out and needs dental care but unable to get in, no pain no swelling, able to eat regular diet, no fever,     Assessment/Plan:  (E11.42,  Z79.4) Type 2 diabetes mellitus with diabetic polyneuropathy, with long-term current use of insulin (H)  (primary encounter diagnosis)  Comment: improved with UTI under control  Plan: continue current regime, discussed  diet, encourage use of novolog 2-3x daily    (I10) Benign essential hypertension  Comment: improved with increase in losartan and stopping labetalol but systolic BP still high   Plan: Will restart labetalol 150mg every other day to decrease systolic BP      (N39.0) Recurrent UTI  Comment: Recently treated the end of Dec. UA/UC repeated 1/4/19, and is at baseline, worried about UTI again due to cloudy urine and one day of bladder spasms  Plan: Will change cath this week and monitor, no need for repeat urine test at this time     (Z94.0) Kidney replaced by transplant  Comment: BP still high   Plan: losartan (COZAAR) 25 MG tablet        Will restart labetalol at 150mg every other day     (R19.7) Diarrhea, unspecified type  Comment: Recent Antibiotic use   Plan: diphenoxylate-atropine (LOMOTIL) 2.5-0.025 MG         tablet        Will reorder her lomotil to use as needed     Patient Instructions   Please call your HC RN about changing your cath on Wed.    We will add labetalol back into your regime to help control your BP, 150mg every other day      I have reordered your lomotil for your diarrhea    Please call if sx worsen    I have reviewed the note as documented above.  This accurately captures the substance of my conversation with the patient.  Klaudia MTZ      Total time of call between patient and provider was 30 minutes     Marivel Barcenas, NP, APRN CNP

## 2019-01-14 NOTE — PATIENT INSTRUCTIONS
Please call your [facility] RN about changing your cath on Wed.    We will add labetalol back into your regime to help control your BP, 150mg every other day      I have reordered your lomotil for your diarrhea

## 2019-01-17 DIAGNOSIS — R19.7 DIARRHEA, UNSPECIFIED TYPE: ICD-10-CM

## 2019-01-17 DIAGNOSIS — Z94.0 KIDNEY REPLACED BY TRANSPLANT: ICD-10-CM

## 2019-01-17 DIAGNOSIS — Z46.6 URINARY CATHETER (FOLEY) CHANGE REQUIRED: ICD-10-CM

## 2019-01-21 ENCOUNTER — TELEPHONE (OUTPATIENT)
Dept: FAMILY MEDICINE | Facility: CLINIC | Age: 74
End: 2019-01-21

## 2019-01-21 RX ORDER — BACLOFEN 10 MG/1
10 TABLET ORAL 2 TIMES DAILY PRN
Qty: 20 TABLET | Refills: 3 | Status: SHIPPED | OUTPATIENT
Start: 2019-01-21 | End: 2019-01-22

## 2019-01-21 RX ORDER — LOSARTAN POTASSIUM 25 MG/1
25 TABLET ORAL DAILY
Qty: 30 TABLET | Refills: 3 | OUTPATIENT
Start: 2019-01-21

## 2019-01-21 RX ORDER — DIPHENOXYLATE HCL/ATROPINE 2.5-.025MG
1 TABLET ORAL 4 TIMES DAILY PRN
Qty: 60 TABLET | Refills: 1 | OUTPATIENT
Start: 2019-01-21

## 2019-01-21 NOTE — TELEPHONE ENCOUNTER
Medication Refill Request    Medication(s) requested:  losartan (COZAAR) 25 MG tablet   diphenoxylate-atropine (LOMOTIL) 2.5-0.025 MG tablet  baclofen (LIORESAL) 10 MG tablet    Last Office Visit: 1/14/19  Next Office Visit: 2/12/19  Labs: up to date     Losartan and Lomotil refilled on 1/4/19--refusing.  Baclofen approved and refilled per [facility] refill protocol.    Sallie Perales RN  01/21/19  9:16 AM

## 2019-01-21 NOTE — TELEPHONE ENCOUNTER
Call from Jamar ohara's spouse, the pharmacy didn't receive the Lomotil Rx dated for 1/14/19. Please re-send Rx.    [facility] tel: [phone]    [facility] contact info: [phone]      Rich Henderson

## 2019-01-22 ENCOUNTER — TELEPHONE (OUTPATIENT)
Dept: FAMILY MEDICINE | Facility: CLINIC | Age: 74
End: 2019-01-22

## 2019-01-22 DIAGNOSIS — R39.9 URINARY TRACT INFECTION SYMPTOMS: Primary | ICD-10-CM

## 2019-01-22 DIAGNOSIS — Z46.6 URINARY CATHETER (FOLEY) CHANGE REQUIRED: ICD-10-CM

## 2019-01-22 NOTE — TELEPHONE ENCOUNTER
Patient is generally only using her Baclofen before cath changes, 2x a month so something must have changed, please call her and see if she is having more bladder spasms, if so please pend a 90 day supply and I will sign it     Klaudia MCFARLANEP

## 2019-01-22 NOTE — TELEPHONE ENCOUNTER
I spoke with , he states she has her cath changed Q 3 weeks & that typically she needs 4 tablets post change for spasms that can last up to 2-3 days, and that she episodically has spasms that this is used for.so he estimates 16-20 per mo.  Marivel Klein RN

## 2019-01-22 NOTE — TELEPHONE ENCOUNTER
[facility] called stating that the pt is requesting a 90 day supply of her baclofen (LIORESAL) 10 MG tablet.     Pharmacy tel: [facility]  Tel: [phone]    Delma Flores  [facility]

## 2019-01-23 ENCOUNTER — TELEPHONE (OUTPATIENT)
Dept: FAMILY MEDICINE | Facility: CLINIC | Age: 74
End: 2019-01-23

## 2019-01-23 RX ORDER — BACLOFEN 10 MG/1
10 TABLET ORAL 2 TIMES DAILY PRN
Qty: 60 TABLET | Refills: 3 | Status: ON HOLD | OUTPATIENT
Start: 2019-01-23 | End: 2019-01-01

## 2019-01-23 NOTE — TELEPHONE ENCOUNTER
Faxed signed Orders to [facility] [phone]. Document sent to abstract for scanning.    Peyton Raymond, CMA

## 2019-01-24 ENCOUNTER — PATIENT OUTREACH (OUTPATIENT)
Dept: CARE COORDINATION | Facility: CLINIC | Age: 74
End: 2019-01-24

## 2019-01-24 DIAGNOSIS — R41.82 ALTERED MENTAL STATUS, UNSPECIFIED ALTERED MENTAL STATUS TYPE: Primary | ICD-10-CM

## 2019-01-24 LAB
ALBUMIN UR-MCNC: 100 MG/DL
APPEARANCE UR: ABNORMAL
BACTERIA #/AREA URNS HPF: ABNORMAL /HPF
BASOPHILS # BLD AUTO: 0 10E9/L (ref 0–0.2)
BASOPHILS NFR BLD AUTO: 0.3 %
BILIRUB UR QL STRIP: NEGATIVE
COLOR UR AUTO: ABNORMAL
DIFFERENTIAL METHOD BLD: ABNORMAL
EOSINOPHIL # BLD AUTO: 0.3 10E9/L (ref 0–0.7)
EOSINOPHIL NFR BLD AUTO: 4 %
ERYTHROCYTE [DISTWIDTH] IN BLOOD BY AUTOMATED COUNT: 13.6 % (ref 10–15)
GLUCOSE UR STRIP-MCNC: 30 MG/DL
HCT VFR BLD AUTO: 32 % (ref 35–47)
HGB BLD-MCNC: 10.1 G/DL (ref 11.7–15.7)
HGB UR QL STRIP: ABNORMAL
IMM GRANULOCYTES # BLD: 0 10E9/L (ref 0–0.4)
IMM GRANULOCYTES NFR BLD: 0.6 %
KETONES UR STRIP-MCNC: NEGATIVE MG/DL
LEUKOCYTE ESTERASE UR QL STRIP: ABNORMAL
LYMPHOCYTES # BLD AUTO: 1.4 10E9/L (ref 0.8–5.3)
LYMPHOCYTES NFR BLD AUTO: 22.7 %
MCH RBC QN AUTO: 29.5 PG (ref 26.5–33)
MCHC RBC AUTO-ENTMCNC: 31.6 G/DL (ref 31.5–36.5)
MCV RBC AUTO: 94 FL (ref 78–100)
MONOCYTES # BLD AUTO: 0.7 10E9/L (ref 0–1.3)
MONOCYTES NFR BLD AUTO: 11.8 %
MUCOUS THREADS #/AREA URNS LPF: PRESENT /LPF
NEUTROPHILS # BLD AUTO: 3.8 10E9/L (ref 1.6–8.3)
NEUTROPHILS NFR BLD AUTO: 60.6 %
NITRATE UR QL: NEGATIVE
NRBC # BLD AUTO: 0 10*3/UL
NRBC BLD AUTO-RTO: 0 /100
PH UR STRIP: 5.5 PH (ref 5–7)
PLATELET # BLD AUTO: 129 10E9/L (ref 150–450)
PROCALCITONIN SERPL-MCNC: 0.15 NG/ML
RBC # BLD AUTO: 3.42 10E12/L (ref 3.8–5.2)
RBC #/AREA URNS AUTO: 93 /HPF (ref 0–2)
SOURCE: ABNORMAL
SP GR UR STRIP: 1.01 (ref 1–1.03)
UROBILINOGEN UR STRIP-MCNC: NORMAL MG/DL (ref 0–2)
WBC # BLD AUTO: 6.3 10E9/L (ref 4–11)
WBC #/AREA URNS AUTO: >182 /HPF (ref 0–5)
YEAST #/AREA URNS HPF: ABNORMAL /HPF

## 2019-01-24 PROCEDURE — 81001 URINALYSIS AUTO W/SCOPE: CPT | Performed by: FAMILY MEDICINE

## 2019-01-24 PROCEDURE — 85025 COMPLETE CBC W/AUTO DIFF WBC: CPT | Performed by: FAMILY MEDICINE

## 2019-01-24 PROCEDURE — 87088 URINE BACTERIA CULTURE: CPT | Performed by: FAMILY MEDICINE

## 2019-01-24 PROCEDURE — 84145 PROCALCITONIN (PCT): CPT | Performed by: FAMILY MEDICINE

## 2019-01-24 PROCEDURE — 87086 URINE CULTURE/COLONY COUNT: CPT | Performed by: FAMILY MEDICINE

## 2019-01-24 PROCEDURE — 87186 SC STD MICRODIL/AGAR DIL: CPT | Performed by: FAMILY MEDICINE

## 2019-01-24 NOTE — TELEPHONE ENCOUNTER
Call placed to Claudia  @: [phone] [facility] RN, she is aware of new lab orders.  Marivel Klein RN

## 2019-01-24 NOTE — PROGRESS NOTES
Clinic Care Coordination Contact  Care Team Conversations    Received call from home care nurse, Claudia. Patient called her to report increased confusion and temp of 98 which she states is a low grade fever for her and confusion is a sign of UTI for her. Patient is not confused with nurse during the phone conversation.  Bs's 200-300's despite not eating. Catheter was changed 1-16-19. See previous virtual visit with PCP regarding same complaint. No UA/UC recommended but patient is asking for this. Request routed to POD, Dr. Iglesias, for recommendations in PCP's absence. Home care nurse was able to obtain only minimal information since she was with a client and to cut the call short due to this.     Porsha Khalil R.N.  Clinic Care Coordinator  [facility]  [phone]

## 2019-01-24 NOTE — PROGRESS NOTES
Clinic Care Coordination Contact  Care Team Conversations    Reviewed with RUBÉN Lake, who consulted with PCP by phone. PCP placed future orders for procalcitonin, ua with micro with reflex to culture, CBC. Verbal orders given to home care nurse, Claudia. She isn't sure if staff can obtain labs today but is OK if labs are delayed until tomorrow if needed. Nurse to review signs with patient that require evaluation in ED prior. She agrees with plan.     Porsha Khalil R.N.  Clinic Care Coordinator  [facility]  [phone]

## 2019-01-24 NOTE — TELEPHONE ENCOUNTER
Patient concerned about a UTI, will order some labs and wait for result, doubt infection based on sx.  Please let the [facility] nurse know I have ordered labs     Klaudia MTZ  \

## 2019-01-26 LAB
BACTERIA SPEC CULT: ABNORMAL
Lab: ABNORMAL
SPECIMEN SOURCE: ABNORMAL

## 2019-01-28 NOTE — TELEPHONE ENCOUNTER
Labs reviewed, no elevated WBC's slight elevation in procalcitonin but no recommendation to treat with antibiotics, patient last treated 12/26, UC growing low amounts of E. Coli, will not treat at this time unless patient is very symptomatic, please call and see how she is doing    Klaudia MCFALRANEP

## 2019-01-29 DIAGNOSIS — E11.42 DIABETIC POLYNEUROPATHY ASSOCIATED WITH TYPE 2 DIABETES MELLITUS (H): ICD-10-CM

## 2019-01-29 NOTE — TELEPHONE ENCOUNTER
Shantelle thinks she may have a compression fx in same area that she has  had 2 in the past,she turned in bed last fri & pain onset was sudden & severe, rated a 10, is still having significant pain with any sudden movement (sneezing/laughing, raising or lowering the bed.) she is wondering if she can use baclofen or if there is something else to relieve the pain. Tylenol does not help.    As to UTI symptoms, she states she is feeling well & has no further concerns.    BP has been running higher in am since Friday (day of injury & increased pain), this morning  205/90. Is feeling anxious regarding this & significant family hx of heart disease.    Routing to PCP   Marivel Klein RN

## 2019-01-29 NOTE — TELEPHONE ENCOUNTER
She should try her topicals creams first, she has a chinese cream that has been helpful in the past or she could try her lidocaine cream that she usually uses for cath changes, if that doesn't help then she can try her baclofen but no more than 2 a day, she should also try ice, if no improvement by Friday I will order a x-ray     Klaudia MTZ

## 2019-01-29 NOTE — TELEPHONE ENCOUNTER
Marivel,    Patient's test strips are being billed to Medicare Part B and they have specific requirements for paying for the test strips.    1. Patient can test blood glucose 3 times daily (must be seen in person once every 12 months).    2. Patient can test more than 3 times per day (current Rx says 4-5 times per day), but there needs to be the following:   -Documentation in the OV from  9/14/18 stating why patient needs  to test their blood sugars 5 times  per day.   -Patient must be seen in person  once every 6 months.      Either option is allowed.  If you want to change her to TID testing please send a new Rx to the [facility].    Thank you,    Lorenzo Orta, PharmD  [facility]  [phone]

## 2019-01-30 ENCOUNTER — TELEPHONE (OUTPATIENT)
Dept: FAMILY MEDICINE | Facility: CLINIC | Age: 74
End: 2019-01-30

## 2019-01-30 ENCOUNTER — PATIENT OUTREACH (OUTPATIENT)
Dept: CARE COORDINATION | Facility: CLINIC | Age: 74
End: 2019-01-30

## 2019-01-30 DIAGNOSIS — R41.82 ALTERED MENTAL STATUS, UNSPECIFIED ALTERED MENTAL STATUS TYPE: Primary | ICD-10-CM

## 2019-01-30 DIAGNOSIS — E03.8 OTHER SPECIFIED HYPOTHYROIDISM: ICD-10-CM

## 2019-01-30 DIAGNOSIS — Z94.0 KIDNEY REPLACED BY TRANSPLANT: ICD-10-CM

## 2019-01-30 NOTE — TELEPHONE ENCOUNTER
Went over PCP instructions with Shantelle, she indicated understanding & will call clinic with update if no better by Fri 2/1/19  Marivel Klein RN

## 2019-01-30 NOTE — PROGRESS NOTES
"Clinic Care Coordination Contact  Care Team Conversations    Received voice mail from home care nurse that she spoke to patient and she has not heard from clinic regarding plan for urine culture results. She reports to the nurse no bladder spasms, fever or nausea or vomiting. Was confused Saturday but that has cleared. She is also reporting blood sugars in the 200's and is trying to add \"one more unit\" to help manage. Reporting blood pressures 190's/80's-205/90. States she is taking all medications as directed but nurse did not provide specific names. In addition, she is reporting 9-10/10 back pain similar to previous compression fracture. Did not fall and does not recall injury. Hurts when she changes positions or tries to sit up. Left detailed msg for home care nurse that I would discuss recommendations with PCP but that if she in the meantime reported any symptoms of AMI, stroke, numbness or tingling, saddle anesthesia, etc to instruct patient to be evaluated in ED immediately. Chart routed to PCP for plan    Porsha Khalil R.N.  Clinic Care Coordinator  [facility]  [phone]              "

## 2019-01-30 NOTE — TELEPHONE ENCOUNTER
EDY Downing to RTC to clinic when he is able to go over PCP instructions for Shantelle.  Marivel Klein RN

## 2019-01-30 NOTE — TELEPHONE ENCOUNTER
Please call the patient and have her increase her labetalol to 1/2 tab daily, which would be 150mg daily, she should have been taking 1/2 tab every other day, may need to check with her  to verify if she is taking meds as directed.  BP meds moving forward should be losartan 25mg and labetalol 150mg daily.  Please have her increase her lantus by 2 units      We have already addressed her back pain and fx concern. See below:     She should try her topicals creams first, she has a chinese cream that has been helpful in the past or she could try her lidocaine cream that she usually uses for cath changes, if that doesn't help then she can try her baclofen but no more than 2 a day, she should also try ice, if no improvement by Friday I will order a x-ray     Klaudia MTZ

## 2019-01-31 NOTE — PROGRESS NOTES
Clinic Care Coordination Contact  Care Team Conversations    See 1-30-19 telephone encounter. Left detailed msg with plan for home care nurse    Porsha Khalil R.N.  Clinic Care Coordinator  [facility]  [phone]

## 2019-02-01 RX ORDER — LEVOTHYROXINE SODIUM 50 UG/1
TABLET ORAL
Qty: 90 TABLET | Refills: 1 | Status: SHIPPED | OUTPATIENT
Start: 2019-02-01 | End: 2019-07-25

## 2019-02-01 NOTE — TELEPHONE ENCOUNTER
Medication Refill Request    Medication(s) requested:  levothyroxine (SYNTHROID/LEVOTHROID) 50 MCG tablet    Last Office Visit: 1/14/19  Next Office Visit: 2/12/19  Labs: up to date       Last TSH drawn on 5/29/18-- WNL at 0.85    Rx approved and refilled per [facility] refill protocol. 6 month supply given-- will need labs rechecked at that time.     Sallie Perales RN  02/01/19  9:47 AM

## 2019-02-04 LAB
ANION GAP SERPL CALCULATED.3IONS-SCNC: 6 MMOL/L (ref 3–14)
BASOPHILS # BLD AUTO: 0 10E9/L (ref 0–0.2)
BASOPHILS NFR BLD AUTO: 0.5 %
BUN SERPL-MCNC: 63 MG/DL (ref 7–30)
CALCIUM SERPL-MCNC: 8.9 MG/DL (ref 8.5–10.1)
CHLORIDE SERPL-SCNC: 109 MMOL/L (ref 94–109)
CO2 SERPL-SCNC: 23 MMOL/L (ref 20–32)
CREAT SERPL-MCNC: 2.05 MG/DL (ref 0.52–1.04)
DIFFERENTIAL METHOD BLD: ABNORMAL
EOSINOPHIL # BLD AUTO: 0.2 10E9/L (ref 0–0.7)
EOSINOPHIL NFR BLD AUTO: 3.5 %
ERYTHROCYTE [DISTWIDTH] IN BLOOD BY AUTOMATED COUNT: 13.5 % (ref 10–15)
GFR SERPL CREATININE-BSD FRML MDRD: 24 ML/MIN/{1.73_M2}
GLUCOSE SERPL-MCNC: 161 MG/DL (ref 70–99)
HCT VFR BLD AUTO: 34.4 % (ref 35–47)
HGB BLD-MCNC: 10.8 G/DL (ref 11.7–15.7)
IMM GRANULOCYTES # BLD: 0 10E9/L (ref 0–0.4)
IMM GRANULOCYTES NFR BLD: 0.3 %
LYMPHOCYTES # BLD AUTO: 1.4 10E9/L (ref 0.8–5.3)
LYMPHOCYTES NFR BLD AUTO: 22.5 %
MCH RBC QN AUTO: 29.3 PG (ref 26.5–33)
MCHC RBC AUTO-ENTMCNC: 31.4 G/DL (ref 31.5–36.5)
MCV RBC AUTO: 93 FL (ref 78–100)
MONOCYTES # BLD AUTO: 0.4 10E9/L (ref 0–1.3)
MONOCYTES NFR BLD AUTO: 6.7 %
NEUTROPHILS # BLD AUTO: 4 10E9/L (ref 1.6–8.3)
NEUTROPHILS NFR BLD AUTO: 66.5 %
NRBC # BLD AUTO: 0 10*3/UL
NRBC BLD AUTO-RTO: 0 /100
PLATELET # BLD AUTO: 161 10E9/L (ref 150–450)
POTASSIUM SERPL-SCNC: 5.3 MMOL/L (ref 3.4–5.3)
RBC # BLD AUTO: 3.69 10E12/L (ref 3.8–5.2)
SODIUM SERPL-SCNC: 138 MMOL/L (ref 133–144)
WBC # BLD AUTO: 6 10E9/L (ref 4–11)

## 2019-02-04 PROCEDURE — 80048 BASIC METABOLIC PNL TOTAL CA: CPT | Performed by: FAMILY MEDICINE

## 2019-02-04 PROCEDURE — 80197 ASSAY OF TACROLIMUS: CPT | Performed by: FAMILY MEDICINE

## 2019-02-04 PROCEDURE — 85025 COMPLETE CBC W/AUTO DIFF WBC: CPT | Performed by: FAMILY MEDICINE

## 2019-02-05 ENCOUNTER — PATIENT OUTREACH (OUTPATIENT)
Dept: CARE COORDINATION | Facility: CLINIC | Age: 74
End: 2019-02-05

## 2019-02-05 ENCOUNTER — TELEPHONE (OUTPATIENT)
Dept: TRANSPLANT | Facility: CLINIC | Age: 74
End: 2019-02-05

## 2019-02-05 DIAGNOSIS — Z94.0 KIDNEY REPLACED BY TRANSPLANT: Primary | ICD-10-CM

## 2019-02-05 DIAGNOSIS — N39.0 URINARY TRACT INFECTION ASSOCIATED WITH INDWELLING URETHRAL CATHETER, SUBSEQUENT ENCOUNTER: Primary | ICD-10-CM

## 2019-02-05 DIAGNOSIS — T83.511D URINARY TRACT INFECTION ASSOCIATED WITH INDWELLING URETHRAL CATHETER, SUBSEQUENT ENCOUNTER: Primary | ICD-10-CM

## 2019-02-05 LAB
TACROLIMUS BLD-MCNC: 5.2 UG/L (ref 5–15)
TME LAST DOSE: NORMAL H

## 2019-02-05 NOTE — PROGRESS NOTES
Clinic Care Coordination Contact  Care Team Conversations    Received call from home care nurseCourt, asking for orders for HHA 3 times per week, SN visits once every other week, catheter changes every 3 weeks, monthly B12 injections and monthly transplant labs. Blood pressure yesterday 178/77 (baseline typically higher). Blood pressure medication changes made by PCP last week. Nurse will confirm with  that changes have been made. Chart routed to PCP to approve home care order request    Porsha Khalil R.N.  Clinic Care Coordinator  [facility]  [phone]

## 2019-02-05 NOTE — TELEPHONE ENCOUNTER
ISSUE:  Creatinine 2.05, up from pt baseline 1.5-1.8    PLAN:   Please call pt to ensure pt is drinking 2-3L/day, no new illness/fever/malaise/abdominal pain/edema, medication changes, or normal UOP. Is pt having and s/s UTI?  If so, pt to f/u with PCP re order for UA/UC. If the above is normal, encourage continued hydration and repeat BMP 1-2 weeks.   Please place order and call pt home care RN.

## 2019-02-06 NOTE — TELEPHONE ENCOUNTER
Call placed to patient. Patient denies any new illness or medication changes. States that she has a cold, no s\s of UTI, notes that her catheter was changed today. Patient v\u to improve her hydration and repeat level in 1-2 week.    SEKOU Toure called. Voice message left making her aware. Order sent

## 2019-02-06 NOTE — PROGRESS NOTES
Clinic Care Coordination Contact  Care Team Conversations    Per triage nurse orders already approved    Porsha Khalil R.N.  Clinic Care Coordinator  [facility]  [phone]

## 2019-02-12 ENCOUNTER — ALLIED HEALTH/NURSE VISIT (OUTPATIENT)
Dept: PHARMACY | Facility: CLINIC | Age: 74
End: 2019-02-12

## 2019-02-12 ENCOUNTER — VIRTUAL VISIT (OUTPATIENT)
Dept: FAMILY MEDICINE | Facility: CLINIC | Age: 74
End: 2019-02-12
Payer: MEDICARE

## 2019-02-12 DIAGNOSIS — M54.6 ACUTE BILATERAL THORACIC BACK PAIN: ICD-10-CM

## 2019-02-12 DIAGNOSIS — I10 BENIGN ESSENTIAL HYPERTENSION: ICD-10-CM

## 2019-02-12 DIAGNOSIS — E11.42 TYPE 2 DIABETES MELLITUS WITH DIABETIC POLYNEUROPATHY, WITH LONG-TERM CURRENT USE OF INSULIN (H): ICD-10-CM

## 2019-02-12 DIAGNOSIS — N39.0 RECURRENT UTI: ICD-10-CM

## 2019-02-12 DIAGNOSIS — Z79.4 TYPE 2 DIABETES MELLITUS WITH DIABETIC POLYNEUROPATHY, WITH LONG-TERM CURRENT USE OF INSULIN (H): ICD-10-CM

## 2019-02-12 DIAGNOSIS — Z94.0 KIDNEY REPLACED BY TRANSPLANT: ICD-10-CM

## 2019-02-12 DIAGNOSIS — I10 BENIGN ESSENTIAL HYPERTENSION: Primary | ICD-10-CM

## 2019-02-12 DIAGNOSIS — E11.42 TYPE 2 DIABETES MELLITUS WITH DIABETIC POLYNEUROPATHY, WITH LONG-TERM CURRENT USE OF INSULIN (H): Primary | ICD-10-CM

## 2019-02-12 DIAGNOSIS — M54.9 BACK PAIN, UNSPECIFIED BACK LOCATION, UNSPECIFIED BACK PAIN LATERALITY, UNSPECIFIED CHRONICITY: ICD-10-CM

## 2019-02-12 DIAGNOSIS — Z79.4 TYPE 2 DIABETES MELLITUS WITH DIABETIC POLYNEUROPATHY, WITH LONG-TERM CURRENT USE OF INSULIN (H): Primary | ICD-10-CM

## 2019-02-12 PROCEDURE — 98968 PH1 ASSMT&MGMT NQHP 21-30: CPT | Performed by: NURSE PRACTITIONER

## 2019-02-12 PROCEDURE — 99607 MTMS BY PHARM ADDL 15 MIN: CPT | Performed by: PHARMACIST

## 2019-02-12 PROCEDURE — 99605 MTMS BY PHARM NP 15 MIN: CPT | Performed by: PHARMACIST

## 2019-02-12 RX ORDER — LABETALOL 300 MG/1
150 TABLET, FILM COATED ORAL 2 TIMES DAILY
Qty: 180 TABLET | Refills: 3 | Status: ON HOLD | COMMUNITY
Start: 2019-02-12 | End: 2019-06-07

## 2019-02-12 NOTE — PROGRESS NOTES
"Gem Jade is a 73 year old female who is being evaluated via a billable telephone visit.      The patient has been notified of following:     \"This telephone visit will be conducted via a call between you and your physician/provider. We have found that certain health care needs can be provided without the need for a physical exam.  This service lets us provide the care you need with a short phone conversation.  If a prescription is necessary we can send it directly to your pharmacy.  If lab work is needed we can place an order for that and you can then stop by our lab to have the test done at a later time.    If during the course of the call the physician/provider feels a telephone visit is not appropriate, you will not be charged for this service.\"     Consent has been obtained for this service by 1 care team member: yes. See the scanned image in the medical record.    Gem Jade complains of  No chief complaint on file.      I have reviewed and updated the patient's Past Medical History, Social History, Family History and Medication List.    ALLERGIES  Bactrim ds [sulfamethoxazole w/trimethoprim]; Atorvastatin calcium; Codeine; Darvocet [propoxyphene n-apap]; Hydromorphone; Levofloxacin; Morphine; Niaspan [niacin]; Percocet [oxycodone-acetaminophen]; and Vicodin [hydrocodone-acetaminophen]    Peyton Raymond CMA   (MA signature)    Additional provider notes: UTI asymptomatic now,  has been feeling okay, blood sugars are elevated but coming down now    Back Pain   x3 weeks, improving but wondering if it is spiking blood sugar levels, similar feeling when she compression fractures in the past,addressed previously see phone note below   topicals creams first, she has a chinese cream that has been helpful in the past or she could try her lidocaine cream that she usually uses for cath changes, if that doesn't help then she can try her baclofen but no more than 2 a day, she should also try ice, re-injuried back " last week but now back to baseline with above treatment, not doing PT right now so not getting out of bed   Never had to use Baclofen     Diabetes Follow-up    Patient is checking blood sugars: three times daily.   Blood sugar testing frequency justification: Uncontrolled diabetes, recently increased lantus by 2 units   Results are as follows:         am - 130-170         lunchtime - forgetting to take novolog for dinner on sliding scale so numbers have been higher during midnight Blood sugar checks, before dinner they have been below in the 200's          Mid night after dinner 300's but that was when she wasn't taking insulin right, went down to 218, 201 when she took 10u of insulin before dinner     Diabetic concerns: None     Symptoms of hypoglycemia (low blood sugar): none     Paresthesias (numbness or burning in feet) or sores: Yes neuropathy     Date of last diabetic eye exam: over 6 years ago bed bound    BP Readings from Last 2 Encounters:   10/10/18 152/70   09/19/18 147/60     Hemoglobin A1C (%)   Date Value   09/16/2018 8.2 (H)   05/26/2018 8.8 (H)     LDL Cholesterol Calculated (mg/dL)   Date Value   12/05/2016 64   02/05/2015 18     Mental Health Symptoms       Duration: at baseline, worries about her kidney and now her BP      Description:  Depression: no  Anxiety: YES- off and on over her DM and her kidney and her BP   Sleep problems: YES- chronic, unusual sleep patterns  Concentration problems: YES- off and on    Therapies tried and outcome: counseling off and on      See PHQ-9 and LISSY scores, as appropriate        Hypertension Follow-up        Outpatient blood pressures are being checked at home by [facility], being followed by her kidney transplant team, recent med change, taking losartan 25mg and labetolol 150mg daily now due to elevated readings      Patient does self-monitor BP. 164/76, 175/78, 174/71, 132/64, 164/76, 168/87,170/82, 130/54, 167/67    Low Salt Diet: no added  salt      Diarrhea  Duration of complaint: improved, one bout last week due to potatoe salad      Dental Issues:  2 teeth fell out and needs dental care but unable to get in, no pain no swelling, able to eat regular diet, no fever,      Assessment/Plan:    (E11.42,  Z79.4) Type 2 diabetes mellitus with diabetic polyneuropathy, with long-term current use of insulin (H)  (primary encounter diagnosis)  Comment: improved except when she forgets to take Novolog as directed   Plan: Hemoglobin A1c, insulin glargine (LANTUS         SOLOSTAR PEN) 100 UNIT/ML pen        Will repeat HgbA1c and continue increase in lantus 24u and Novolog 10u with meals plus sliding scale     (I10) Benign essential hypertension  Comment: slight improvement in diastolic, systolic still high   Plan: will increase labetalol to 1/2 tab 2x daily, will notify [facility] nurse of change     (N39.0) Recurrent UTI  Comment: stable no sx currently, cath being changed every 3 weeks   Plan: monitor     (Z94.0) Kidney replaced by transplant  Comment: working on BP, BMP abnormal at 2/4 draw  Plan: re-draw BMP on 2/15     (M54.6) Acute bilateral thoracic back pain  Comment: improving  Plan: discussed sx treatment     I have reviewed the note as documented above.  This accurately captures the substance of my conversation with the patient.  Klaudia Barcenas FNP      Total time of call between patient and provider was 30 minutes     Marivel Barcenas, NP, APRN CNP

## 2019-02-12 NOTE — PROGRESS NOTES
SUBJECTIVE/OBJECTIVE:                Gem Jade is a 73 year old female called for a follow-up visit for Medication Therapy Management.  She was referred to me from Marivel Barcenas. Ben with PCP.    This is a follow-up visit but the first visit of this calendar year.    Chief Complaint: Follow up from our visit on 18.    1. Back pain has improved  2. Blood pressure still high    Tobacco: History of tobacco dependence- quit  Alcohol: not currently using    Medication Adherence/Access:  Patient has assistance with medication administration and setting up boxes ( [facility]).     Back pain: thinks she has a compression fracture in her back.  Has not needed to use baclofen. Resting has been helpful and pain has improved.    Hypertension: Currently taking losartan 25mg daily, labetalol 150mg once daily. No side effects reported. Blood pressures are being checked by [facility] and self. Using wrist cuff but often checks it against HCR[facility] cuff and seems to be accurate.   164/76  175/78  174/71  132/64  164/76  168/87  170/82  130/54    Diabetes:  Pt currently taking Lantus 24u daily and novolog 10 units + 2-8 units sliding scale with dinner and Novolog sliding scale only with breakfast.  Has been going well with doing the injections herself and double checking the dose with her magnifier, however at first had not been giving herself the additional 10u with dinner (see below). Pt is not experiencing side effects.   Sliding scale:   140-239=2 units  240-339=4 units  340-439=6 units  >440=8 units  SMB-3 times daily.   Ranges (pt reported):   Fasting 130-170s, pre-dinner <200, midnight low 200s since she restarted using the 10u with dinner in addition to the sliding scale (had been 300s prior to that)  Patient is not experiencing hypoglycemia  Recent symptoms of high blood sugar? none  Eye exam: due  Foot exam: due  ACEi/ARB: Losartan  Urine Albumin:   Lab Results   Component Value Date    UMALCR 6776.56  (H) 04/26/2017      Aspirin: Taking 81mg daily and denies side effects  Diet/Exercise: No breakfast (stopped Ensure, was giving her diarrhea) and meal for dinner.  Trying to restart eating breakfast.  Lab Results   Component Value Date    A1C 8.2 09/16/2018    A1C 8.8 05/26/2018    A1C 6.3 01/12/2018    A1C 6.7 12/19/2017    A1C 7.7 11/14/2017       Today's Vitals: There were no vitals taken for this visit. - phone call      ASSESSMENT:              Current medications were reviewed today as discussed above.      Medication Adherence: excellent, no issues identified    Back pain: improved, continue conservative cares    Hypertension: needs improvement. BP not at goal <140/90. Recommend increasing labetalol to BID    Diabetes: improved. A1c not at goal <8%, due for recheck. Will order with next labs.       PLAN:                  1. A1c recheck  2. Increase labetalol to 150mg BID    I spent 30 minutes with this patient today. All changes were made via collaborative practice agreement with Marivel Barcenas. A copy of the visit note was provided to the patient's primary care provider.     Will follow up in 2-3 weeks.    The patient was sent via [facility] a summary of these recommendations as an after visit summary.    Conchita Toledo, PharmD, BCACP

## 2019-02-12 NOTE — Clinical Note
Please call her [facility] and let her know of the med changes and that we have added HGBa1c to her blood draw on 2/15

## 2019-02-12 NOTE — PATIENT INSTRUCTIONS
We will increase your labetalol to 1/2 tab 2x daily     Continue your increase in Lantus 24u and use Novolog as directed      Please continue your symptom treatment for your back

## 2019-02-14 NOTE — PROGRESS NOTES
Pt has a new [facility] RN  Court [phone], LM on her VM to RTC to clinic to go over med changes.  Marivel Klein RN

## 2019-02-15 LAB
ANION GAP SERPL CALCULATED.3IONS-SCNC: 7 MMOL/L (ref 3–14)
BUN SERPL-MCNC: 59 MG/DL (ref 7–30)
CALCIUM SERPL-MCNC: 8.4 MG/DL (ref 8.5–10.1)
CHLORIDE SERPL-SCNC: 108 MMOL/L (ref 94–109)
CO2 SERPL-SCNC: 23 MMOL/L (ref 20–32)
CREAT SERPL-MCNC: 1.84 MG/DL (ref 0.52–1.04)
GFR SERPL CREATININE-BSD FRML MDRD: 27 ML/MIN/{1.73_M2}
GLUCOSE SERPL-MCNC: 192 MG/DL (ref 70–99)
POTASSIUM SERPL-SCNC: 4.4 MMOL/L (ref 3.4–5.3)
SODIUM SERPL-SCNC: 138 MMOL/L (ref 133–144)

## 2019-02-15 PROCEDURE — 80048 BASIC METABOLIC PNL TOTAL CA: CPT | Performed by: INTERNAL MEDICINE

## 2019-02-18 ENCOUNTER — DOCUMENTATION ONLY (OUTPATIENT)
Dept: FAMILY MEDICINE | Facility: CLINIC | Age: 74
End: 2019-02-18

## 2019-02-18 NOTE — PROGRESS NOTES
[facility] now requests orders and shares plan of care/discharge summaries for some patients through [employer].  Please REPLY TO THIS MESSAGE OR ROUTE BACK TO THE AUTHOR in order to give authorization for orders when needed.  This is considered a verbal order, you will still receive a faxed copy of orders for signature.  Thank you for your assistance in improving collaboration for our patients.    ORDER    MD SUMMARY/PLAN OF CARE    Pt c/o higher than normal blood sugars and increased sediment in urine. States this is usually what happens to her when she gets a UTI. Would you like homecare RN to collect urine sample?     Thanks,  Court

## 2019-02-18 NOTE — PROGRESS NOTES
I left detailed med changes on Court's , [facility] RN, left clinic # to CB if there are any questions.  Marivel Klein RN

## 2019-02-19 NOTE — PROGRESS NOTES
Please call patient to get more information about her sx. She always has sediment in her urine and frequently her sugars run high.  I need some vitals, assess mental clarity     Klaudia MCFARLANEP

## 2019-02-19 NOTE — PROGRESS NOTES
"Attempted to reach HC nurse. Was not able to reach her or leave a message.    PT was contacted.    She states she doesn't know when she has an UIT until she passes out \"I am asymptomatic \".  Today states  \"I feel alright\" Blood sugars have come down now more in usual range.  says urine odor is a little smellier than usual and but no more sediment  (he changes the catheter) . Aide did not think there was more sediment than usual. Pt states thinking clear, she even asked her  , no confusion.     Pt states she will let us know tomorrow what the aide thinks of the urine when she comes to do her cares as she is familiar with her baseline.    Amita Irby, RN, BSN       "

## 2019-02-22 NOTE — PROGRESS NOTES
Per RN she saw her yesterday & cognitively pt was very clear, urine actually was clear, RN saw no sediment. She will see her on 2/25 & update us unless PCP would like pt seen by HC before then.  Marivel Klein RN

## 2019-02-27 ENCOUNTER — MEDICAL CORRESPONDENCE (OUTPATIENT)
Dept: HEALTH INFORMATION MANAGEMENT | Facility: CLINIC | Age: 74
End: 2019-02-27

## 2019-02-27 ENCOUNTER — TELEPHONE (OUTPATIENT)
Dept: FAMILY MEDICINE | Facility: CLINIC | Age: 74
End: 2019-02-27

## 2019-02-27 LAB
ALBUMIN UR-MCNC: 100 MG/DL
APPEARANCE UR: ABNORMAL
BILIRUB UR QL STRIP: NEGATIVE
COLOR UR AUTO: YELLOW
ERYTHROCYTE [DISTWIDTH] IN BLOOD BY AUTOMATED COUNT: 14.1 % (ref 10–15)
GLUCOSE UR STRIP-MCNC: NEGATIVE MG/DL
HCT VFR BLD AUTO: 32.4 % (ref 35–47)
HGB BLD-MCNC: 10 G/DL (ref 11.7–15.7)
HGB UR QL STRIP: ABNORMAL
KETONES UR STRIP-MCNC: NEGATIVE MG/DL
LEUKOCYTE ESTERASE UR QL STRIP: ABNORMAL
MCH RBC QN AUTO: 28.9 PG (ref 26.5–33)
MCHC RBC AUTO-ENTMCNC: 30.9 G/DL (ref 31.5–36.5)
MCV RBC AUTO: 94 FL (ref 78–100)
NITRATE UR QL: NEGATIVE
PH UR STRIP: 6 PH (ref 5–7)
PLATELET # BLD AUTO: 113 10E9/L (ref 150–450)
RBC # BLD AUTO: 3.46 10E12/L (ref 3.8–5.2)
RBC #/AREA URNS AUTO: 159 /HPF (ref 0–2)
SOURCE: ABNORMAL
SP GR UR STRIP: 1.01 (ref 1–1.03)
SQUAMOUS #/AREA URNS AUTO: 1 /HPF (ref 0–1)
UROBILINOGEN UR STRIP-MCNC: NORMAL MG/DL (ref 0–2)
WBC # BLD AUTO: 9.9 10E9/L (ref 4–11)
WBC #/AREA URNS AUTO: 139 /HPF (ref 0–5)
WBC CLUMPS #/AREA URNS HPF: PRESENT /HPF

## 2019-02-27 PROCEDURE — 85027 COMPLETE CBC AUTOMATED: CPT | Performed by: FAMILY MEDICINE

## 2019-02-27 PROCEDURE — 87088 URINE BACTERIA CULTURE: CPT | Performed by: FAMILY MEDICINE

## 2019-02-27 PROCEDURE — 87086 URINE CULTURE/COLONY COUNT: CPT | Performed by: FAMILY MEDICINE

## 2019-02-27 PROCEDURE — 87186 SC STD MICRODIL/AGAR DIL: CPT | Performed by: FAMILY MEDICINE

## 2019-02-27 PROCEDURE — 81001 URINALYSIS AUTO W/SCOPE: CPT | Performed by: FAMILY MEDICINE

## 2019-02-27 NOTE — TELEPHONE ENCOUNTER
Call from pt's spouse Chang requesting a call back, pt has a UTI infection, writer ask what are her Sx per Chang it's UTI like. Pt gets one every other month.    Chang tel: [phone]      Rich Henderson

## 2019-02-27 NOTE — TELEPHONE ENCOUNTER
Faxed SN Orders to [facility]. Documentation to sent abstract for scanning    Peyton Raymond CMA

## 2019-02-27 NOTE — TELEPHONE ENCOUNTER
There are future orders in Epic for both CBC and UA.    Home care nurse can certainly collect the samples and let us know when the results are in.  We are all monitoring Klaudia Barcenas's lab results.     Kelsie Perez, CNP, APNP  [facility]

## 2019-02-27 NOTE — TELEPHONE ENCOUNTER
"Spoke to Chang, pt is asymptomatic as she usually is, urine is not malodorous & no sediment, but there has been the start of some confusion \" maybe 1-2 questions out of 10 will be answered inappropriately\" which is her hallmark sign of possible UTI.  Pt did not eat at all 2/26, BS 71 this am. Pt has had this many times &  feels he recognizes the pattern to a UTI  Chang would like to have  a UA/UC  & CBC  ASAP. There are standing orders .  Spoke with Tamara SAPP  they will still attempt to see pt tondahlia, if not Court will see her in am 2/28/19.  Routing to POD to advise  Marivel Klein RN       "

## 2019-02-28 ENCOUNTER — HOSPITAL ENCOUNTER (INPATIENT)
Facility: CLINIC | Age: 74
LOS: 5 days | Discharge: HOME HEALTH | DRG: 698 | End: 2019-03-05
Attending: EMERGENCY MEDICINE | Admitting: FAMILY MEDICINE
Payer: MEDICARE

## 2019-02-28 ENCOUNTER — APPOINTMENT (OUTPATIENT)
Dept: ULTRASOUND IMAGING | Facility: CLINIC | Age: 74
DRG: 698 | End: 2019-02-28
Payer: MEDICARE

## 2019-02-28 ENCOUNTER — APPOINTMENT (OUTPATIENT)
Dept: GENERAL RADIOLOGY | Facility: CLINIC | Age: 74
DRG: 698 | End: 2019-02-28
Attending: EMERGENCY MEDICINE
Payer: MEDICARE

## 2019-02-28 ENCOUNTER — APPOINTMENT (OUTPATIENT)
Dept: CT IMAGING | Facility: CLINIC | Age: 74
DRG: 698 | End: 2019-02-28
Attending: EMERGENCY MEDICINE
Payer: MEDICARE

## 2019-02-28 ENCOUNTER — PATIENT OUTREACH (OUTPATIENT)
Dept: CARE COORDINATION | Facility: CLINIC | Age: 74
End: 2019-02-28

## 2019-02-28 DIAGNOSIS — N17.9 ACUTE RENAL FAILURE, UNSPECIFIED ACUTE RENAL FAILURE TYPE (H): ICD-10-CM

## 2019-02-28 DIAGNOSIS — Z94.0 KIDNEY REPLACED BY TRANSPLANT: Primary | ICD-10-CM

## 2019-02-28 DIAGNOSIS — R65.20 SEVERE SEPSIS (H): ICD-10-CM

## 2019-02-28 DIAGNOSIS — T83.511D URINARY TRACT INFECTION ASSOCIATED WITH INDWELLING URETHRAL CATHETER, SUBSEQUENT ENCOUNTER: Primary | ICD-10-CM

## 2019-02-28 DIAGNOSIS — A41.9 SEVERE SEPSIS (H): ICD-10-CM

## 2019-02-28 DIAGNOSIS — N39.0 URINARY TRACT INFECTION ASSOCIATED WITH INDWELLING URETHRAL CATHETER, INITIAL ENCOUNTER (H): ICD-10-CM

## 2019-02-28 DIAGNOSIS — T83.511A URINARY TRACT INFECTION ASSOCIATED WITH INDWELLING URETHRAL CATHETER, INITIAL ENCOUNTER (H): ICD-10-CM

## 2019-02-28 DIAGNOSIS — R41.82 ALTERED MENTAL STATUS, UNSPECIFIED ALTERED MENTAL STATUS TYPE: ICD-10-CM

## 2019-02-28 DIAGNOSIS — I10 BENIGN ESSENTIAL HYPERTENSION: ICD-10-CM

## 2019-02-28 DIAGNOSIS — N17.9 ACUTE KIDNEY INJURY (H): ICD-10-CM

## 2019-02-28 DIAGNOSIS — E87.5 HYPERKALEMIA: ICD-10-CM

## 2019-02-28 DIAGNOSIS — R19.7 DIARRHEA, UNSPECIFIED TYPE: ICD-10-CM

## 2019-02-28 DIAGNOSIS — N39.0 COMPLICATED UTI (URINARY TRACT INFECTION): ICD-10-CM

## 2019-02-28 DIAGNOSIS — N39.0 URINARY TRACT INFECTION ASSOCIATED WITH INDWELLING URETHRAL CATHETER, SUBSEQUENT ENCOUNTER: Primary | ICD-10-CM

## 2019-02-28 LAB
ALBUMIN SERPL-MCNC: 3.2 G/DL (ref 3.4–5)
ALBUMIN UR-MCNC: 100 MG/DL
ALBUMIN UR-MCNC: 30 MG/DL
ALP SERPL-CCNC: 103 U/L (ref 40–150)
ALT SERPL W P-5'-P-CCNC: 31 U/L (ref 0–50)
ANION GAP SERPL CALCULATED.3IONS-SCNC: 5 MMOL/L (ref 3–14)
ANION GAP SERPL CALCULATED.3IONS-SCNC: 7 MMOL/L (ref 3–14)
ANION GAP SERPL CALCULATED.3IONS-SCNC: 7 MMOL/L (ref 3–14)
ANION GAP SERPL CALCULATED.3IONS-SCNC: 8 MMOL/L (ref 3–14)
ANION GAP SERPL CALCULATED.3IONS-SCNC: 8 MMOL/L (ref 3–14)
APPEARANCE UR: ABNORMAL
APPEARANCE UR: ABNORMAL
AST SERPL W P-5'-P-CCNC: 31 U/L (ref 0–45)
BACTERIA #/AREA URNS HPF: ABNORMAL /HPF
BACTERIA #/AREA URNS HPF: ABNORMAL /HPF
BASE DEFICIT BLDV-SCNC: 5.4 MMOL/L
BASE DEFICIT BLDV-SCNC: 8 MMOL/L
BASOPHILS # BLD AUTO: 0 10E9/L (ref 0–0.2)
BASOPHILS NFR BLD AUTO: 0.1 %
BILIRUB SERPL-MCNC: 0.4 MG/DL (ref 0.2–1.3)
BILIRUB UR QL STRIP: NEGATIVE
BILIRUB UR QL STRIP: NEGATIVE
BUN SERPL-MCNC: 58 MG/DL (ref 7–30)
BUN SERPL-MCNC: 62 MG/DL (ref 7–30)
BUN SERPL-MCNC: 70 MG/DL (ref 7–30)
CA-I BLD-SCNC: 5 MG/DL (ref 4.4–5.2)
CALCIUM SERPL-MCNC: 8.3 MG/DL (ref 8.5–10.1)
CALCIUM SERPL-MCNC: 8.4 MG/DL (ref 8.5–10.1)
CALCIUM SERPL-MCNC: 8.4 MG/DL (ref 8.5–10.1)
CALCIUM SERPL-MCNC: 8.6 MG/DL (ref 8.5–10.1)
CALCIUM SERPL-MCNC: 8.6 MG/DL (ref 8.5–10.1)
CHLORIDE SERPL-SCNC: 108 MMOL/L (ref 94–109)
CHLORIDE SERPL-SCNC: 110 MMOL/L (ref 94–109)
CHLORIDE SERPL-SCNC: 112 MMOL/L (ref 94–109)
CHLORIDE SERPL-SCNC: 112 MMOL/L (ref 94–109)
CHLORIDE SERPL-SCNC: 113 MMOL/L (ref 94–109)
CO2 BLDCOV-SCNC: 22 MMOL/L (ref 21–28)
CO2 BLDCOV-SCNC: 23 MMOL/L (ref 21–28)
CO2 SERPL-SCNC: 18 MMOL/L (ref 20–32)
CO2 SERPL-SCNC: 20 MMOL/L (ref 20–32)
CO2 SERPL-SCNC: 23 MMOL/L (ref 20–32)
COLOR UR AUTO: ABNORMAL
COLOR UR AUTO: YELLOW
CREAT SERPL-MCNC: 2.09 MG/DL (ref 0.52–1.04)
CREAT SERPL-MCNC: 2.13 MG/DL (ref 0.52–1.04)
CREAT SERPL-MCNC: 2.2 MG/DL (ref 0.52–1.04)
CREAT SERPL-MCNC: 2.26 MG/DL (ref 0.52–1.04)
CREAT SERPL-MCNC: 2.27 MG/DL (ref 0.52–1.04)
DIFFERENTIAL METHOD BLD: ABNORMAL
EOSINOPHIL # BLD AUTO: 0 10E9/L (ref 0–0.7)
EOSINOPHIL NFR BLD AUTO: 0.2 %
ERYTHROCYTE [DISTWIDTH] IN BLOOD BY AUTOMATED COUNT: 13.9 % (ref 10–15)
ERYTHROCYTE [DISTWIDTH] IN BLOOD BY AUTOMATED COUNT: 14 % (ref 10–15)
GFR SERPL CREATININE-BSD FRML MDRD: 21 ML/MIN/{1.73_M2}
GFR SERPL CREATININE-BSD FRML MDRD: 21 ML/MIN/{1.73_M2}
GFR SERPL CREATININE-BSD FRML MDRD: 22 ML/MIN/{1.73_M2}
GFR SERPL CREATININE-BSD FRML MDRD: 23 ML/MIN/{1.73_M2}
GFR SERPL CREATININE-BSD FRML MDRD: 23 ML/MIN/{1.73_M2}
GLUCOSE BLD-MCNC: 90 MG/DL (ref 70–99)
GLUCOSE BLDC GLUCOMTR-MCNC: 113 MG/DL (ref 70–99)
GLUCOSE BLDC GLUCOMTR-MCNC: 116 MG/DL (ref 70–99)
GLUCOSE BLDC GLUCOMTR-MCNC: 170 MG/DL (ref 70–99)
GLUCOSE BLDC GLUCOMTR-MCNC: 215 MG/DL (ref 70–99)
GLUCOSE BLDC GLUCOMTR-MCNC: 75 MG/DL (ref 70–99)
GLUCOSE BLDC GLUCOMTR-MCNC: 88 MG/DL (ref 70–99)
GLUCOSE BLDC GLUCOMTR-MCNC: 88 MG/DL (ref 70–99)
GLUCOSE SERPL-MCNC: 115 MG/DL (ref 70–99)
GLUCOSE SERPL-MCNC: 119 MG/DL (ref 70–99)
GLUCOSE SERPL-MCNC: 210 MG/DL (ref 70–99)
GLUCOSE SERPL-MCNC: 90 MG/DL (ref 70–99)
GLUCOSE SERPL-MCNC: 99 MG/DL (ref 70–99)
GLUCOSE UR STRIP-MCNC: 70 MG/DL
GLUCOSE UR STRIP-MCNC: NEGATIVE MG/DL
HBA1C MFR BLD: 7.3 % (ref 0–5.6)
HCO3 BLDV-SCNC: 20 MMOL/L (ref 21–28)
HCO3 BLDV-SCNC: 22 MMOL/L (ref 21–28)
HCT VFR BLD AUTO: 33 % (ref 35–47)
HCT VFR BLD AUTO: 36.5 % (ref 35–47)
HCT VFR BLD CALC: 34 %PCV (ref 35–47)
HGB BLD CALC-MCNC: 11.6 G/DL (ref 11.7–15.7)
HGB BLD-MCNC: 10.7 G/DL (ref 11.7–15.7)
HGB BLD-MCNC: 9.8 G/DL (ref 11.7–15.7)
HGB UR QL STRIP: ABNORMAL
HGB UR QL STRIP: ABNORMAL
IMM GRANULOCYTES # BLD: 0 10E9/L (ref 0–0.4)
IMM GRANULOCYTES NFR BLD: 0.4 %
INR PPP: 1.09 (ref 0.86–1.14)
INTERPRETATION ECG - MUSE: NORMAL
KETONES UR STRIP-MCNC: NEGATIVE MG/DL
KETONES UR STRIP-MCNC: NEGATIVE MG/DL
LACTATE BLD-SCNC: 0.4 MMOL/L (ref 0.7–2.1)
LACTATE BLD-SCNC: 0.6 MMOL/L (ref 0.7–2)
LACTATE BLD-SCNC: 1.4 MMOL/L (ref 0.7–2)
LEUKOCYTE ESTERASE UR QL STRIP: ABNORMAL
LEUKOCYTE ESTERASE UR QL STRIP: ABNORMAL
LYMPHOCYTES # BLD AUTO: 0.4 10E9/L (ref 0.8–5.3)
LYMPHOCYTES NFR BLD AUTO: 3.3 %
MAGNESIUM SERPL-MCNC: 2.5 MG/DL (ref 1.6–2.3)
MAGNESIUM SERPL-MCNC: 2.6 MG/DL (ref 1.6–2.3)
MCH RBC QN AUTO: 28.9 PG (ref 26.5–33)
MCH RBC QN AUTO: 29.3 PG (ref 26.5–33)
MCHC RBC AUTO-ENTMCNC: 29.3 G/DL (ref 31.5–36.5)
MCHC RBC AUTO-ENTMCNC: 29.7 G/DL (ref 31.5–36.5)
MCV RBC AUTO: 99 FL (ref 78–100)
MCV RBC AUTO: 99 FL (ref 78–100)
MONOCYTES # BLD AUTO: 0.5 10E9/L (ref 0–1.3)
MONOCYTES NFR BLD AUTO: 3.9 %
MRSA DNA SPEC QL NAA+PROBE: POSITIVE
MUCOUS THREADS #/AREA URNS LPF: PRESENT /LPF
NEUTROPHILS # BLD AUTO: 10.5 10E9/L (ref 1.6–8.3)
NEUTROPHILS NFR BLD AUTO: 92.1 %
NITRATE UR QL: NEGATIVE
NITRATE UR QL: POSITIVE
NRBC # BLD AUTO: 0 10*3/UL
NRBC BLD AUTO-RTO: 0 /100
O2/TOTAL GAS SETTING VFR VENT: 95 %
O2/TOTAL GAS SETTING VFR VENT: 96 %
PCO2 BLDV: 45 MM HG (ref 40–50)
PCO2 BLDV: 51 MM HG (ref 40–50)
PCO2 BLDV: 54 MM HG (ref 40–50)
PCO2 BLDV: 57 MM HG (ref 40–50)
PH BLDV: 7.2 PH (ref 7.32–7.43)
PH BLDV: 7.22 PH (ref 7.32–7.43)
PH BLDV: 7.23 PH (ref 7.32–7.43)
PH BLDV: 7.31 PH (ref 7.32–7.43)
PH UR STRIP: 5 PH (ref 5–7)
PH UR STRIP: 5.5 PH (ref 5–7)
PHOSPHATE SERPL-MCNC: 3.7 MG/DL (ref 2.5–4.5)
PHOSPHATE SERPL-MCNC: 4 MG/DL (ref 2.5–4.5)
PLATELET # BLD AUTO: 100 10E9/L (ref 150–450)
PLATELET # BLD AUTO: 101 10E9/L (ref 150–450)
PLATELET # BLD AUTO: 109 10E9/L (ref 150–450)
PO2 BLDV: 23 MM HG (ref 25–47)
PO2 BLDV: 26 MM HG (ref 25–47)
PO2 BLDV: 40 MM HG (ref 25–47)
PO2 BLDV: 46 MM HG (ref 25–47)
POTASSIUM BLD-SCNC: 6.4 MMOL/L (ref 3.4–5.3)
POTASSIUM BLD-SCNC: 6.4 MMOL/L (ref 3.4–5.3)
POTASSIUM SERPL-SCNC: 5.5 MMOL/L (ref 3.4–5.3)
POTASSIUM SERPL-SCNC: 5.5 MMOL/L (ref 3.4–5.3)
POTASSIUM SERPL-SCNC: 5.6 MMOL/L (ref 3.4–5.3)
POTASSIUM SERPL-SCNC: 6 MMOL/L (ref 3.4–5.3)
POTASSIUM SERPL-SCNC: 6.3 MMOL/L (ref 3.4–5.3)
PROCALCITONIN SERPL-MCNC: 0.54 NG/ML
PROT SERPL-MCNC: 7.8 G/DL (ref 6.8–8.8)
RBC # BLD AUTO: 3.35 10E12/L (ref 3.8–5.2)
RBC # BLD AUTO: 3.7 10E12/L (ref 3.8–5.2)
RBC #/AREA URNS AUTO: 31 /HPF (ref 0–2)
RBC #/AREA URNS AUTO: 64 /HPF (ref 0–2)
SAO2 % BLDV FROM PO2: 29 %
SAO2 % BLDV FROM PO2: 77 %
SODIUM BLD-SCNC: 136 MMOL/L (ref 133–144)
SODIUM SERPL-SCNC: 135 MMOL/L (ref 133–144)
SODIUM SERPL-SCNC: 137 MMOL/L (ref 133–144)
SODIUM SERPL-SCNC: 139 MMOL/L (ref 133–144)
SODIUM SERPL-SCNC: 139 MMOL/L (ref 133–144)
SODIUM SERPL-SCNC: 141 MMOL/L (ref 133–144)
SOURCE: ABNORMAL
SOURCE: ABNORMAL
SP GR UR STRIP: 1.01 (ref 1–1.03)
SP GR UR STRIP: 1.01 (ref 1–1.03)
SPECIMEN SOURCE: ABNORMAL
SQUAMOUS #/AREA URNS AUTO: 21 /HPF (ref 0–1)
SQUAMOUS #/AREA URNS AUTO: <1 /HPF (ref 0–1)
TRANS CELLS #/AREA URNS HPF: 2 /HPF (ref 0–1)
TRANS CELLS #/AREA URNS HPF: <1 /HPF (ref 0–1)
TROPONIN I BLD-MCNC: 0 UG/L (ref 0–0.08)
TROPONIN I SERPL-MCNC: <0.015 UG/L (ref 0–0.04)
UROBILINOGEN UR STRIP-MCNC: NORMAL MG/DL (ref 0–2)
UROBILINOGEN UR STRIP-MCNC: NORMAL MG/DL (ref 0–2)
WBC # BLD AUTO: 11.4 10E9/L (ref 4–11)
WBC # BLD AUTO: 12.8 10E9/L (ref 4–11)
WBC #/AREA URNS AUTO: 1760 /HPF (ref 0–5)
WBC #/AREA URNS AUTO: >182 /HPF (ref 0–5)
WBC CLUMPS #/AREA URNS HPF: PRESENT /HPF
WBC CLUMPS #/AREA URNS HPF: PRESENT /HPF
YEAST #/AREA URNS HPF: ABNORMAL /HPF

## 2019-02-28 PROCEDURE — 00000146 ZZHCL STATISTIC GLUCOSE BY METER IP

## 2019-02-28 PROCEDURE — 36415 COLL VENOUS BLD VENIPUNCTURE: CPT | Performed by: STUDENT IN AN ORGANIZED HEALTH CARE EDUCATION/TRAINING PROGRAM

## 2019-02-28 PROCEDURE — 25800030 ZZH RX IP 258 OP 636: Performed by: FAMILY MEDICINE

## 2019-02-28 PROCEDURE — 83735 ASSAY OF MAGNESIUM: CPT | Performed by: STUDENT IN AN ORGANIZED HEALTH CARE EDUCATION/TRAINING PROGRAM

## 2019-02-28 PROCEDURE — 96367 TX/PROPH/DG ADDL SEQ IV INF: CPT | Performed by: EMERGENCY MEDICINE

## 2019-02-28 PROCEDURE — 93005 ELECTROCARDIOGRAM TRACING: CPT | Performed by: EMERGENCY MEDICINE

## 2019-02-28 PROCEDURE — 70450 CT HEAD/BRAIN W/O DYE: CPT

## 2019-02-28 PROCEDURE — 36415 COLL VENOUS BLD VENIPUNCTURE: CPT | Performed by: FAMILY MEDICINE

## 2019-02-28 PROCEDURE — 81001 URINALYSIS AUTO W/SCOPE: CPT | Performed by: EMERGENCY MEDICINE

## 2019-02-28 PROCEDURE — 25800030 ZZH RX IP 258 OP 636: Performed by: EMERGENCY MEDICINE

## 2019-02-28 PROCEDURE — 80048 BASIC METABOLIC PNL TOTAL CA: CPT | Performed by: FAMILY MEDICINE

## 2019-02-28 PROCEDURE — 96375 TX/PRO/DX INJ NEW DRUG ADDON: CPT | Performed by: EMERGENCY MEDICINE

## 2019-02-28 PROCEDURE — 99292 CRITICAL CARE ADDL 30 MIN: CPT | Mod: Z6 | Performed by: EMERGENCY MEDICINE

## 2019-02-28 PROCEDURE — 96365 THER/PROPH/DIAG IV INF INIT: CPT | Performed by: EMERGENCY MEDICINE

## 2019-02-28 PROCEDURE — 99285 EMERGENCY DEPT VISIT HI MDM: CPT | Mod: 25 | Performed by: EMERGENCY MEDICINE

## 2019-02-28 PROCEDURE — 93010 ELECTROCARDIOGRAM REPORT: CPT | Performed by: INTERNAL MEDICINE

## 2019-02-28 PROCEDURE — 25000128 H RX IP 250 OP 636: Performed by: FAMILY MEDICINE

## 2019-02-28 PROCEDURE — 96366 THER/PROPH/DIAG IV INF ADDON: CPT | Performed by: EMERGENCY MEDICINE

## 2019-02-28 PROCEDURE — 80048 BASIC METABOLIC PNL TOTAL CA: CPT | Performed by: EMERGENCY MEDICINE

## 2019-02-28 PROCEDURE — 93308 TTE F-UP OR LMTD: CPT | Performed by: EMERGENCY MEDICINE

## 2019-02-28 PROCEDURE — 25000125 ZZHC RX 250: Performed by: EMERGENCY MEDICINE

## 2019-02-28 PROCEDURE — 40000498 ZZHCL STATISTIC POTASSIUM ED POCT

## 2019-02-28 PROCEDURE — 93010 ELECTROCARDIOGRAM REPORT: CPT | Mod: Z6 | Performed by: EMERGENCY MEDICINE

## 2019-02-28 PROCEDURE — 25000132 ZZH RX MED GY IP 250 OP 250 PS 637: Mod: GY | Performed by: EMERGENCY MEDICINE

## 2019-02-28 PROCEDURE — A9270 NON-COVERED ITEM OR SERVICE: HCPCS | Mod: GY | Performed by: EMERGENCY MEDICINE

## 2019-02-28 PROCEDURE — 96360 HYDRATION IV INFUSION INIT: CPT | Mod: 59 | Performed by: EMERGENCY MEDICINE

## 2019-02-28 PROCEDURE — 84484 ASSAY OF TROPONIN QUANT: CPT

## 2019-02-28 PROCEDURE — 93308 TTE F-UP OR LMTD: CPT | Mod: 26 | Performed by: EMERGENCY MEDICINE

## 2019-02-28 PROCEDURE — 25800025 ZZH RX 258: Performed by: EMERGENCY MEDICINE

## 2019-02-28 PROCEDURE — 40000502 ZZHCL STATISTIC GLUCOSE ED POCT

## 2019-02-28 PROCEDURE — 85027 COMPLETE CBC AUTOMATED: CPT | Performed by: STUDENT IN AN ORGANIZED HEALTH CARE EDUCATION/TRAINING PROGRAM

## 2019-02-28 PROCEDURE — 84100 ASSAY OF PHOSPHORUS: CPT | Performed by: STUDENT IN AN ORGANIZED HEALTH CARE EDUCATION/TRAINING PROGRAM

## 2019-02-28 PROCEDURE — 85025 COMPLETE CBC W/AUTO DIFF WBC: CPT | Performed by: EMERGENCY MEDICINE

## 2019-02-28 PROCEDURE — 82803 BLOOD GASES ANY COMBINATION: CPT

## 2019-02-28 PROCEDURE — 87641 MR-STAPH DNA AMP PROBE: CPT | Performed by: FAMILY MEDICINE

## 2019-02-28 PROCEDURE — 87800 DETECT AGNT MULT DNA DIREC: CPT | Performed by: EMERGENCY MEDICINE

## 2019-02-28 PROCEDURE — 81001 URINALYSIS AUTO W/SCOPE: CPT | Performed by: STUDENT IN AN ORGANIZED HEALTH CARE EDUCATION/TRAINING PROGRAM

## 2019-02-28 PROCEDURE — A9270 NON-COVERED ITEM OR SERVICE: HCPCS | Mod: GY | Performed by: STUDENT IN AN ORGANIZED HEALTH CARE EDUCATION/TRAINING PROGRAM

## 2019-02-28 PROCEDURE — 83036 HEMOGLOBIN GLYCOSYLATED A1C: CPT | Performed by: STUDENT IN AN ORGANIZED HEALTH CARE EDUCATION/TRAINING PROGRAM

## 2019-02-28 PROCEDURE — 84145 PROCALCITONIN (PCT): CPT | Performed by: STUDENT IN AN ORGANIZED HEALTH CARE EDUCATION/TRAINING PROGRAM

## 2019-02-28 PROCEDURE — 40000497 ZZHCL STATISTIC SODIUM ED POCT

## 2019-02-28 PROCEDURE — 20000004 ZZH R&B ICU UMMC

## 2019-02-28 PROCEDURE — 85049 AUTOMATED PLATELET COUNT: CPT | Performed by: FAMILY MEDICINE

## 2019-02-28 PROCEDURE — 82803 BLOOD GASES ANY COMBINATION: CPT | Performed by: EMERGENCY MEDICINE

## 2019-02-28 PROCEDURE — 99291 CRITICAL CARE FIRST HOUR: CPT | Mod: 25 | Performed by: EMERGENCY MEDICINE

## 2019-02-28 PROCEDURE — 87640 STAPH A DNA AMP PROBE: CPT | Performed by: FAMILY MEDICINE

## 2019-02-28 PROCEDURE — 93005 ELECTROCARDIOGRAM TRACING: CPT

## 2019-02-28 PROCEDURE — 76776 US EXAM K TRANSPL W/DOPPLER: CPT

## 2019-02-28 PROCEDURE — 25000128 H RX IP 250 OP 636: Performed by: EMERGENCY MEDICINE

## 2019-02-28 PROCEDURE — 25800030 ZZH RX IP 258 OP 636: Performed by: STUDENT IN AN ORGANIZED HEALTH CARE EDUCATION/TRAINING PROGRAM

## 2019-02-28 PROCEDURE — 51702 INSERT TEMP BLADDER CATH: CPT | Performed by: EMERGENCY MEDICINE

## 2019-02-28 PROCEDURE — 80053 COMPREHEN METABOLIC PANEL: CPT | Performed by: EMERGENCY MEDICINE

## 2019-02-28 PROCEDURE — 82330 ASSAY OF CALCIUM: CPT

## 2019-02-28 PROCEDURE — 36415 COLL VENOUS BLD VENIPUNCTURE: CPT | Performed by: EMERGENCY MEDICINE

## 2019-02-28 PROCEDURE — 85610 PROTHROMBIN TIME: CPT | Performed by: EMERGENCY MEDICINE

## 2019-02-28 PROCEDURE — 82803 BLOOD GASES ANY COMBINATION: CPT | Performed by: STUDENT IN AN ORGANIZED HEALTH CARE EDUCATION/TRAINING PROGRAM

## 2019-02-28 PROCEDURE — 80048 BASIC METABOLIC PNL TOTAL CA: CPT | Performed by: STUDENT IN AN ORGANIZED HEALTH CARE EDUCATION/TRAINING PROGRAM

## 2019-02-28 PROCEDURE — 83605 ASSAY OF LACTIC ACID: CPT

## 2019-02-28 PROCEDURE — 25000132 ZZH RX MED GY IP 250 OP 250 PS 637: Mod: GY | Performed by: STUDENT IN AN ORGANIZED HEALTH CARE EDUCATION/TRAINING PROGRAM

## 2019-02-28 PROCEDURE — 87040 BLOOD CULTURE FOR BACTERIA: CPT | Performed by: EMERGENCY MEDICINE

## 2019-02-28 PROCEDURE — 96361 HYDRATE IV INFUSION ADD-ON: CPT | Performed by: EMERGENCY MEDICINE

## 2019-02-28 PROCEDURE — 87088 URINE BACTERIA CULTURE: CPT | Performed by: EMERGENCY MEDICINE

## 2019-02-28 PROCEDURE — 87086 URINE CULTURE/COLONY COUNT: CPT | Performed by: EMERGENCY MEDICINE

## 2019-02-28 PROCEDURE — 40000501 ZZHCL STATISTIC HEMATOCRIT ED POCT

## 2019-02-28 PROCEDURE — 87186 SC STD MICRODIL/AGAR DIL: CPT | Performed by: EMERGENCY MEDICINE

## 2019-02-28 PROCEDURE — 84484 ASSAY OF TROPONIN QUANT: CPT | Performed by: EMERGENCY MEDICINE

## 2019-02-28 PROCEDURE — 83605 ASSAY OF LACTIC ACID: CPT | Performed by: EMERGENCY MEDICINE

## 2019-02-28 PROCEDURE — 25000131 ZZH RX MED GY IP 250 OP 636 PS 637: Mod: GY | Performed by: STUDENT IN AN ORGANIZED HEALTH CARE EDUCATION/TRAINING PROGRAM

## 2019-02-28 PROCEDURE — 71045 X-RAY EXAM CHEST 1 VIEW: CPT

## 2019-02-28 PROCEDURE — 87077 CULTURE AEROBIC IDENTIFY: CPT | Performed by: EMERGENCY MEDICINE

## 2019-02-28 RX ORDER — TACROLIMUS 1 MG/1
2 CAPSULE ORAL
Status: DISCONTINUED | OUTPATIENT
Start: 2019-02-28 | End: 2019-02-28

## 2019-02-28 RX ORDER — DEXTROSE, SODIUM CHLORIDE, SODIUM LACTATE, POTASSIUM CHLORIDE, AND CALCIUM CHLORIDE 5; .6; .31; .03; .02 G/100ML; G/100ML; G/100ML; G/100ML; G/100ML
INJECTION, SOLUTION INTRAVENOUS CONTINUOUS
Status: DISCONTINUED | OUTPATIENT
Start: 2019-02-28 | End: 2019-03-01

## 2019-02-28 RX ORDER — DEXTROSE MONOHYDRATE 25 G/50ML
25-50 INJECTION, SOLUTION INTRAVENOUS
Status: DISCONTINUED | OUTPATIENT
Start: 2019-02-28 | End: 2019-02-28

## 2019-02-28 RX ORDER — NALOXONE HYDROCHLORIDE 0.4 MG/ML
0.4 INJECTION, SOLUTION INTRAMUSCULAR; INTRAVENOUS; SUBCUTANEOUS ONCE
Status: DISCONTINUED | OUTPATIENT
Start: 2019-02-28 | End: 2019-03-03

## 2019-02-28 RX ORDER — NALOXONE HYDROCHLORIDE 0.4 MG/ML
INJECTION, SOLUTION INTRAMUSCULAR; INTRAVENOUS; SUBCUTANEOUS
Status: DISCONTINUED
Start: 2019-02-28 | End: 2019-02-28 | Stop reason: HOSPADM

## 2019-02-28 RX ORDER — PIPERACILLIN SODIUM, TAZOBACTAM SODIUM 3; .375 G/15ML; G/15ML
3.38 INJECTION, POWDER, LYOPHILIZED, FOR SOLUTION INTRAVENOUS EVERY 6 HOURS
Status: DISCONTINUED | OUTPATIENT
Start: 2019-02-28 | End: 2019-03-01

## 2019-02-28 RX ORDER — CALCIUM GLUCONATE 94 MG/ML
2 INJECTION, SOLUTION INTRAVENOUS ONCE
Status: DISCONTINUED | OUTPATIENT
Start: 2019-02-28 | End: 2019-02-28

## 2019-02-28 RX ORDER — ONDANSETRON 4 MG/1
4 TABLET, ORALLY DISINTEGRATING ORAL EVERY 6 HOURS PRN
Status: DISCONTINUED | OUTPATIENT
Start: 2019-02-28 | End: 2019-03-01

## 2019-02-28 RX ORDER — NICOTINE POLACRILEX 4 MG
15-30 LOZENGE BUCCAL
Status: DISCONTINUED | OUTPATIENT
Start: 2019-02-28 | End: 2019-03-03

## 2019-02-28 RX ORDER — NALOXONE HYDROCHLORIDE 0.4 MG/ML
.1-.4 INJECTION, SOLUTION INTRAMUSCULAR; INTRAVENOUS; SUBCUTANEOUS
Status: DISCONTINUED | OUTPATIENT
Start: 2019-02-28 | End: 2019-03-05 | Stop reason: HOSPADM

## 2019-02-28 RX ORDER — FUROSEMIDE 10 MG/ML
20 INJECTION INTRAMUSCULAR; INTRAVENOUS ONCE
Status: COMPLETED | OUTPATIENT
Start: 2019-02-28 | End: 2019-02-28

## 2019-02-28 RX ORDER — ONDANSETRON 2 MG/ML
4 INJECTION INTRAMUSCULAR; INTRAVENOUS EVERY 6 HOURS PRN
Status: DISCONTINUED | OUTPATIENT
Start: 2019-02-28 | End: 2019-03-01

## 2019-02-28 RX ORDER — SODIUM CHLORIDE 9 MG/ML
1000 INJECTION, SOLUTION INTRAVENOUS CONTINUOUS
Status: DISCONTINUED | OUTPATIENT
Start: 2019-02-28 | End: 2019-02-28

## 2019-02-28 RX ORDER — METHYLPREDNISOLONE SODIUM SUCCINATE 40 MG/ML
4 INJECTION, POWDER, LYOPHILIZED, FOR SOLUTION INTRAMUSCULAR; INTRAVENOUS DAILY
Status: DISCONTINUED | OUTPATIENT
Start: 2019-02-28 | End: 2019-03-01

## 2019-02-28 RX ORDER — HEPARIN SODIUM 5000 [USP'U]/.5ML
5000 INJECTION, SOLUTION INTRAVENOUS; SUBCUTANEOUS EVERY 12 HOURS
Status: DISCONTINUED | OUTPATIENT
Start: 2019-02-28 | End: 2019-03-05 | Stop reason: HOSPADM

## 2019-02-28 RX ORDER — PIPERACILLIN SODIUM, TAZOBACTAM SODIUM 3; .375 G/15ML; G/15ML
3.38 INJECTION, POWDER, LYOPHILIZED, FOR SOLUTION INTRAVENOUS ONCE
Status: COMPLETED | OUTPATIENT
Start: 2019-02-28 | End: 2019-02-28

## 2019-02-28 RX ORDER — DEXTROSE MONOHYDRATE 25 G/50ML
25-50 INJECTION, SOLUTION INTRAVENOUS
Status: DISCONTINUED | OUTPATIENT
Start: 2019-02-28 | End: 2019-03-03

## 2019-02-28 RX ORDER — PIPERACILLIN SODIUM, TAZOBACTAM SODIUM 4; .5 G/20ML; G/20ML
4.5 INJECTION, POWDER, LYOPHILIZED, FOR SOLUTION INTRAVENOUS EVERY 6 HOURS
Status: DISCONTINUED | OUTPATIENT
Start: 2019-02-28 | End: 2019-02-28

## 2019-02-28 RX ORDER — NICOTINE POLACRILEX 4 MG
15-30 LOZENGE BUCCAL
Status: DISCONTINUED | OUTPATIENT
Start: 2019-02-28 | End: 2019-02-28

## 2019-02-28 RX ORDER — LEVETIRACETAM 5 MG/ML
500 INJECTION INTRAVASCULAR EVERY 12 HOURS
Status: DISCONTINUED | OUTPATIENT
Start: 2019-02-28 | End: 2019-03-02

## 2019-02-28 RX ORDER — ACETAMINOPHEN 650 MG/1
650 SUPPOSITORY RECTAL EVERY 4 HOURS PRN
Status: DISCONTINUED | OUTPATIENT
Start: 2019-02-28 | End: 2019-03-05 | Stop reason: HOSPADM

## 2019-02-28 RX ORDER — METHYLPREDNISOLONE SODIUM SUCCINATE 40 MG/ML
4 INJECTION, POWDER, LYOPHILIZED, FOR SOLUTION INTRAMUSCULAR; INTRAVENOUS DAILY
Status: DISCONTINUED | OUTPATIENT
Start: 2019-02-28 | End: 2019-02-28

## 2019-02-28 RX ORDER — SODIUM CHLORIDE 9 MG/ML
INJECTION, SOLUTION INTRAVENOUS CONTINUOUS
Status: DISCONTINUED | OUTPATIENT
Start: 2019-02-28 | End: 2019-03-02

## 2019-02-28 RX ORDER — CALCIUM GLUCONATE 94 MG/ML
2 INJECTION, SOLUTION INTRAVENOUS ONCE
Status: DISCONTINUED | OUTPATIENT
Start: 2019-02-28 | End: 2019-03-04 | Stop reason: CLARIF

## 2019-02-28 RX ORDER — HYDRALAZINE HYDROCHLORIDE 20 MG/ML
10 INJECTION INTRAMUSCULAR; INTRAVENOUS EVERY 6 HOURS PRN
Status: DISCONTINUED | OUTPATIENT
Start: 2019-02-28 | End: 2019-03-05 | Stop reason: HOSPADM

## 2019-02-28 RX ORDER — DEXTROSE MONOHYDRATE 25 G/50ML
25 INJECTION, SOLUTION INTRAVENOUS ONCE
Status: DISCONTINUED | OUTPATIENT
Start: 2019-02-28 | End: 2019-02-28

## 2019-02-28 RX ADMIN — INSULIN GLARGINE 12 UNITS: 100 INJECTION, SOLUTION SUBCUTANEOUS at 22:43

## 2019-02-28 RX ADMIN — PIPERACILLIN SODIUM AND TAZOBACTAM SODIUM 3.38 G: 3; .375 INJECTION, POWDER, LYOPHILIZED, FOR SOLUTION INTRAVENOUS at 02:15

## 2019-02-28 RX ADMIN — LEVETIRACETAM 500 MG: 5 INJECTION INTRAVENOUS at 16:20

## 2019-02-28 RX ADMIN — SODIUM CHLORIDE 1000 ML: 9 INJECTION, SOLUTION INTRAVENOUS at 00:41

## 2019-02-28 RX ADMIN — SODIUM CHLORIDE 1000 ML: 9 INJECTION, SOLUTION INTRAVENOUS at 01:58

## 2019-02-28 RX ADMIN — HUMAN INSULIN 5 UNITS: 100 INJECTION, SOLUTION SUBCUTANEOUS at 04:54

## 2019-02-28 RX ADMIN — DEXTROSE MONOHYDRATE 50 ML: 500 INJECTION PARENTERAL at 04:48

## 2019-02-28 RX ADMIN — SODIUM CHLORIDE, SODIUM LACTATE, POTASSIUM CHLORIDE, CALCIUM CHLORIDE AND DEXTROSE MONOHYDRATE: 5; 600; 310; 30; 20 INJECTION, SOLUTION INTRAVENOUS at 12:55

## 2019-02-28 RX ADMIN — FUROSEMIDE 20 MG: 10 INJECTION, SOLUTION INTRAVENOUS at 17:59

## 2019-02-28 RX ADMIN — HEPARIN SODIUM 5000 UNITS: 5000 INJECTION, SOLUTION INTRAVENOUS; SUBCUTANEOUS at 11:59

## 2019-02-28 RX ADMIN — SODIUM BICARBONATE 50 MEQ: 84 INJECTION INTRAVENOUS at 04:52

## 2019-02-28 RX ADMIN — INSULIN ASPART 2 UNITS: 100 INJECTION, SOLUTION INTRAVENOUS; SUBCUTANEOUS at 23:52

## 2019-02-28 RX ADMIN — SODIUM CHLORIDE 500 ML: 9 INJECTION, SOLUTION INTRAVENOUS at 06:03

## 2019-02-28 RX ADMIN — METHYLPREDNISOLONE SODIUM SUCCINATE 4 MG: 40 INJECTION, POWDER, LYOPHILIZED, FOR SOLUTION INTRAMUSCULAR; INTRAVENOUS at 17:59

## 2019-02-28 RX ADMIN — ACETAMINOPHEN 650 MG: 650 SUPPOSITORY RECTAL at 11:45

## 2019-02-28 RX ADMIN — CALCIUM GLUCONATE 2 G: 98 INJECTION, SOLUTION INTRAVENOUS at 01:22

## 2019-02-28 RX ADMIN — PIPERACILLIN AND TAZOBACTAM 3.38 G: 3; .375 INJECTION, POWDER, FOR SOLUTION INTRAVENOUS at 08:19

## 2019-02-28 RX ADMIN — PIPERACILLIN AND TAZOBACTAM 3.38 G: 3; .375 INJECTION, POWDER, FOR SOLUTION INTRAVENOUS at 14:23

## 2019-02-28 RX ADMIN — INSULIN ASPART 1 UNITS: 100 INJECTION, SOLUTION INTRAVENOUS; SUBCUTANEOUS at 21:42

## 2019-02-28 RX ADMIN — ACETAMINOPHEN 650 MG: 325 SUPPOSITORY RECTAL at 02:00

## 2019-02-28 RX ADMIN — DEXTROSE MONOHYDRATE 56 MCG/HR: 50 INJECTION, SOLUTION INTRAVENOUS at 20:12

## 2019-02-28 RX ADMIN — SODIUM CHLORIDE: 9 INJECTION, SOLUTION INTRAVENOUS at 08:06

## 2019-02-28 RX ADMIN — PIPERACILLIN AND TAZOBACTAM 3.38 G: 3; .375 INJECTION, POWDER, FOR SOLUTION INTRAVENOUS at 20:25

## 2019-02-28 RX ADMIN — VANCOMYCIN HYDROCHLORIDE 2250 MG: 1 INJECTION, POWDER, LYOPHILIZED, FOR SOLUTION INTRAVENOUS at 03:53

## 2019-02-28 ASSESSMENT — ACTIVITIES OF DAILY LIVING (ADL)
ADLS_ACUITY_SCORE: 34
ADLS_ACUITY_SCORE: 34
ADLS_ACUITY_SCORE: 32
ADLS_ACUITY_SCORE: 34

## 2019-02-28 ASSESSMENT — MIFFLIN-ST. JEOR: SCORE: 1663.47

## 2019-02-28 NOTE — ED NOTES
[facility], [place]   ED Nurse to Floor Handoff     Gem Jade is a 73 year old female who speaks English and lives with a spouse,  in a home  They arrived in the ED by ambulance from home    ED Chief Complaint: Altered Mental Status    ED Dx;   Final diagnoses:   Severe sepsis (H)   Acute renal failure, unspecified acute renal failure type (H)   Hyperkalemia   Urinary tract infection associated with indwelling urethral catheter, initial encounter (H)         Needed?: No    Allergies:   Allergies   Allergen Reactions     Bactrim Ds [Sulfamethoxazole W/Trimethoprim]      Creatinine level increased     Atorvastatin Calcium      myalgias, muscle aches     Codeine Nausea and Vomiting     Darvocet [Propoxyphene N-Apap] Nausea and Vomiting     Hydromorphone      Levofloxacin Other (See Comments) and Diarrhea     hallucinations     Morphine      Nausea and vomiting     Niaspan [Niacin] GI Disturbance     Flushing even at low dose, GI upset     Percocet [Oxycodone-Acetaminophen]      Vomiting after taking med couple of times     Vicodin [Hydrocodone-Acetaminophen] Nausea and Vomiting   .  Past Medical Hx:   Past Medical History:   Diagnosis Date     Background diabetic retinopathy(362.01)     extensive diabetic retinopathy, s/p multiple laser treatments     Diabetic gastroparesis (H)     followed by [facility] (Dr. 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)      Urinary tract infection, site not specified      Chronic UTIs     UTI (urinary tract infection) due to urinary indwelling catheter (H)       Baseline Mental status: WDL  Current Mental Status changes: unresponsive    Infection present or suspected this encounter: yes urinary  Sepsis suspected: Yes  Isolation type: Contact     Activity level - Baseline/Home:  Total Care  Activity Level - Current:   Total Care    Bariatric equipment needed?: No    In the ED these meds were given:   Medications   0.9% sodium chloride BOLUS (0 mLs Intravenous Stopped 2/28/19 0156)     Followed by   sodium chloride 0.9% infusion (not administered)   naloxone (NARCAN) injection 0.4 mg ( Intravenous Canceled Entry 2/28/19 0022)   vancomycin place smith - receiving intermittent dosing (not administered)   insulin glargine (LANTUS PEN) injection 12 Units ( Subcutaneous Not Given 2/28/19 0605)   naloxone (NARCAN) injection 0.1-0.4 mg (not administered)   melatonin tablet 1 mg (not administered)   sodium chloride 0.9% infusion (not administered)   acetaminophen (TYLENOL) Suppository 650 mg (not administered)   ondansetron (ZOFRAN-ODT) ODT tab 4 mg (not administered)     Or   ondansetron (ZOFRAN) injection 4 mg (not administered)   insulin aspart (NovoLOG) inj (RAPID ACTING) (0 Units Subcutaneous Hold 2/28/19 0605)   glucose gel 15-30 g ( Oral See Alternative 2/28/19 0448)     Or   dextrose 50 % injection 25-50 mL (50 mLs Intravenous Given 2/28/19 0448)     Or   glucagon injection 1 mg ( Subcutaneous See Alternative 2/28/19 0448)   calcium gluconate 10 % injection 2 g (0 g Intravenous Hold 2/28/19 0429)   piperacillin-tazobactam (ZOSYN) 3.375 g vial to attach to  mL bag (not administered)   hydrALAZINE (APRESOLINE) injection 10 mg (not administered)   0.9% sodium chloride BOLUS (500 mLs Intravenous New Bag 2/28/19 0603)   piperacillin-tazobactam (ZOSYN) 3.375 g vial to attach to  mL bag (0 g Intravenous Stopped 2/28/19 0245)   calcium gluconate in D5W 100 mL intermittent infusion  2 g (2 g Intravenous Given 2/28/19 0122)   acetaminophen (TYLENOL) Suppository 650 mg (650 mg Rectal Given 2/28/19 0200)   0.9% sodium chloride BOLUS (0 mLs Intravenous Stopped 2/28/19 0315)   vancomycin (VANCOCIN) 2,250 mg in sodium chloride 0.9 % 500 mL intermittent infusion (2,250 mg Intravenous New Bag 2/28/19 9813)   sodium bicarbonate 8.4 % injection 50 mEq (50 mEq Intravenous Given 2/28/19 6372)   insulin (regular) (HumuLIN R/NovoLIN R) 1 unit/mL injection 5 Units (5 Units Intravenous Given 2/28/19 0204)       Drips running?  Yes    Home pump  No    Current LDAs  Peripheral IV 02/28/19 Right Hand (Active)   Site Assessment WDL 2/28/2019 12:13 AM   Line Status Saline locked 2/28/2019 12:13 AM   Phlebitis Scale 0-->no symptoms 2/28/2019 12:13 AM   Number of days: 0       Peripheral IV 02/28/19 Left Lower forearm (Active)   Number of days: 0       Urethral Catheter Double-lumen 16 fr (Active)   Number of days: 413       Urethral Catheter Coude 18 fr (Active)   Number of days: 166       Urethral Catheter 16 fr (Active)   Catheter Care Done 2/28/2019  2:14 AM   Number of days: 0       Wound 07/05/17 Posterior Buttocks Suspected pressure ulcer (Active)   Number of days: 603       Wound 12/20/17 Left Groin Skin tear;Shear injury (Active)   Number of days: 435       Wound 01/11/18 Coccyx Ulceration non blanchable   (Active)   Number of days: 413       Wound 01/24/18 Right Groin Shear injury;Skin tear (Active)   Number of days: 400       Wound 07/05/18 Right Groin Laceration (Active)   Number of days: 238       Wound 09/17/18 Left;Posterior Buttocks Skin tear (Active)   Number of days: 164       Rash 05/26/18 1600 pubis patch (Active)   Number of days: 278       Rash 07/05/18 1900 Left mid axillary patch (Active)   Number of days: 238       Labs results:   Labs Ordered and Resulted from Time of ED Arrival Up to the Time of Departure from the ED   CBC WITH PLATELETS DIFFERENTIAL - Abnormal; Notable for the following  components:       Result Value    WBC 11.4 (*)     RBC Count 3.70 (*)     Hemoglobin 10.7 (*)     MCHC 29.3 (*)     Platelet Count 101 (*)     Absolute Neutrophil 10.5 (*)     Absolute Lymphocytes 0.4 (*)     All other components within normal limits   COMPREHENSIVE METABOLIC PANEL - Abnormal; Notable for the following components:    Potassium 6.3 (*)     Urea Nitrogen 70 (*)     Creatinine 2.27 (*)     GFR Estimate 21 (*)     GFR Estimate If Black 24 (*)     Albumin 3.2 (*)     All other components within normal limits   ROUTINE UA WITH MICROSCOPIC REFLEX TO CULTURE - Abnormal; Notable for the following components:    Blood Urine Large (*)     Protein Albumin Urine 100 (*)     Nitrite Urine Positive (*)     Leukocyte Esterase Urine Large (*)     WBC Urine 1,760 (*)     RBC Urine 64 (*)     WBC Clumps Present (*)     Bacteria Urine Moderate (*)     Squamous Epithelial /HPF Urine 21 (*)     Transitional Epi 2 (*)     All other components within normal limits   POTASSIUM WHOLE BLOOD - Abnormal; Notable for the following components:    Potassium 6.4 (*)     All other components within normal limits   BASIC METABOLIC PANEL - Abnormal; Notable for the following components:    Potassium 6.0 (*)     Chloride 110 (*)     Carbon Dioxide 18 (*)     Urea Nitrogen 62 (*)     Creatinine 2.20 (*)     GFR Estimate 22 (*)     GFR Estimate If Black 25 (*)     All other components within normal limits   BLOOD GAS VENOUS - Abnormal; Notable for the following components:    Ph Venous 7.20 (*)     PCO2 Venous 51 (*)     Bicarbonate Venous 20 (*)     All other components within normal limits   LACTIC ACID WHOLE BLOOD - Abnormal; Notable for the following components:    Lactic Acid 0.6 (*)     All other components within normal limits   BLOOD GAS VENOUS - Abnormal; Notable for the following components:    Ph Venous 7.23 (*)     PCO2 Venous 54 (*)     All other components within normal limits   CBC WITH PLATELETS - Abnormal; Notable for  the following components:    WBC 12.8 (*)     RBC Count 3.35 (*)     Hemoglobin 9.8 (*)     Hematocrit 33.0 (*)     MCHC 29.7 (*)     Platelet Count 109 (*)     All other components within normal limits   GLUCOSE BY METER - Abnormal; Notable for the following components:    Glucose 113 (*)     All other components within normal limits   ISTAT  GASES LACTATE DONNA POCT - Abnormal; Notable for the following components:    Ph Venous 7.31 (*)     Lactic Acid 0.4 (*)     All other components within normal limits   ISTAT GASES ELEC ICA GLUC DONNA POCT - Abnormal; Notable for the following components:    Ph Venous 7.22 (*)     PCO2 Venous 57 (*)     PO2 Venous 23 (*)     Potassium 6.4 (*)     Hemoglobin 11.6 (*)     Hematocrit - POCT 34 (*)     All other components within normal limits   INR   LACTIC ACID WHOLE BLOOD   TROPONIN I   HEMOGLOBIN A1C   GLUCOSE BY METER   BASIC METABOLIC PANEL   MAGNESIUM   PHOSPHORUS   GLUCOSE MONITOR NURSING POCT   GLUCOSE MONITOR NURSING POCT   IP ASSIGN PROVIDER TEAM TO TREATMENT TEAM   VITAL SIGNS   PULSE OXIMETRY NURSING   TELEMETRY MONITORING MED/SURG   INTAKE AND OUTPUT   DAILY WEIGHTS   NEURO CHECKS   INCENTIVE SPIROMETRY NURSING   INDWELLING URINARY CATHETER (HODGES)   DISCONTINUE INDWELLING URINARY CATHETER (HODGES)   BLADDER SCAN   APPLY PNEUMATIC COMPRESSION DEVICE (PCD)   IP CARBOHYDRATE COUNTING BY NURSING   PATIENT EDUCATION   ASSESS FOR HYPOGLYCEMIA SYMPTOMS   NOTIFY PHYSICIAN   CARDIAC CONTINUOUS MONITORING   ASSESS FOR HYPOGLYCEMIA SYMPTOMS   NOTIFY PHYSICIAN   TROPONIN POCT   ISTAT GASES ELEC ICA GLUC DONNA POCT   URINE CULTURE AEROBIC BACTERIAL   BLOOD CULTURE   BLOOD CULTURE       Imaging Studies:   Recent Results (from the past 24 hour(s))   POC US ECHO LIMITED    Impression    Limited Bedside Cardiac Ultrasound, performed and interpreted by me.   Indication: Hypotension/shock.  Parasternal long axis and parasternal short axis views were acquired.   Image quality was  "fair.    Findings:    Global left ventricular function appears intact.  Chambers do not appear dilated.  There is no evidence of free fluid within the pericardium.  There is sclerosis along the aortic valve    IMPRESSION: Grossly intact left ventricular function and normal chamber size.  No pericardial effusion.  Sclerotic aortic valve     CT Head w/o Contrast    Narrative    CT HEAD W/O CONTRAST 2/28/2019 1:04 AM    Provided History: Altered level of consciousness (LOC), unexplained  ICD-10:    Comparison: CT of the head dated 9/16/2018..    Technique: Using multidetector thin collimation helical acquisition  technique, axial, coronal and sagittal CT images from the skull base  to the vertex were obtained without intravenous contrast.     Findings:    No intracranial hemorrhage, mass effect, or midline shift. The  ventricles are proportionate to the cerebral sulci. Generalized  cerebral volume loss and periventricular leukoaraiosis similar to  prior exam. The gray to white matter differentiation of the cerebral  hemispheres is preserved. The basal cisterns are patent.    The visualized paranasal sinuses are clear. The mastoid air cells are  clear.    Vascular calcifications in left and right cavernous carotid arteries  and the visible portions of the vertebral arteries.       Impression    Impression: No acute intracranial pathology.    I have personally reviewed the examination and initial interpretation  and I agree with the findings.    TAWANDA MAGANA MD   XR Chest Port 1 View    Impression    Impression:   1. No acute cardiopulmonary findings.  2. Mild enlargement of the cardiac silhouette similar to prior exam.       Recent vital signs:   /71   Pulse 83   Temp 100  F (37.8  C)   Resp 14   Ht 1.753 m (5' 9\")   Wt 108.9 kg (240 lb 1.6 oz)   SpO2 97%   BMI 35.46 kg/m      Cardiac Rhythm: Normal Sinus  Pt needs tele? Yes  Skin/wound Issues: Patient is bed bound. non-mobile    Code Status: Full " Code    Pain control: good    Nausea control: good    Abnormal labs/tests/findings requiring intervention: Elevated Potassium    Family present during ED course? No   Family Comments/Social Situation comments: Spoke to  on phone    Tasks needing completion: None    Dennise Rojas RN  [phone] [facility]  [phone] [facility]

## 2019-02-28 NOTE — PROVIDER NOTIFICATION
[facility] Resident paged to notify Pt's pupils un-reactive, Pt continues to only respond to painful stimuli (withdraws extremities.) Will continue to monitor.

## 2019-02-28 NOTE — ED PROVIDER NOTES
[place] EMERGENCY DEPARTMENT ([facility])  2/28/19    History     Chief Complaint   Patient presents with     Altered Mental Status     HPI  Gem Jade is a 73 year old female with a past medical history significant for DM2, polyneuropathy, kidney transplant (1999) and recurrent UTIs secondary to indwelling rico complicated by toxic encephalopathy who presents to the Emergency Department today via EMS with an altered mental status. Patient has been unresponsive at times since 12 PM (2/27/19).    Chart review reveals there is been concern of UTI for several days and urinalysis had been sent with anticipated oncoming treatment course.  The patient worsened while at home, became warm according to her partner until sent for evaluation after she continued to worsen today.    In the emergency department the patient has minimal but not purposeful response to voice, does purposefully move her extremities but is unable to participate in any history gathering.        I have reviewed the Medications, Allergies, Past Medical and Surgical History, and Social History in the [employer] system.    PAST MEDICAL HISTORY:   Past Medical History:   Diagnosis Date     Background diabetic retinopathy(362.01)     extensive diabetic retinopathy, s/p multiple laser treatments     Diabetic gastroparesis (H)     followed by [facility] (Dr. 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)      Urinary tract infection, site not specified     Chronic UTIs     UTI (urinary  "tract infection) due to urinary indwelling catheter (H)        PAST SURGICAL HISTORY:   Past Surgical History:   Procedure Laterality Date     C NONSPECIFIC PROCEDURE  10/99    kidney transplant     C NONSPECIFIC PROCEDURE      MRI head, lumbar spine, pelvis     C TOTAL KNEE ARTHROPLASTY  3/03    Knee Replacement, Total right, post op PE     CHOLECYSTECTOMY       HC LEFT HEART CATHETERIZATION      Cardiac Cath, Left Heart, Negative  (@ [facility])     HC LEFT HEART CATHETERIZATION      normal coronary angiogram at [facility], had mild troponin rise (0.71)     HC REMV CATARACT EXTRACAP,INSERT LENS      bilateral cataract repaired, left eye        FAMILY HISTORY:   Family History   Problem Relation Age of Onset     Diabetes Mother      Parkinsonism Mother      Diabetes Brother      Hypertension Father         [id number] pneumonia     Heart Disease Father        SOCIAL HISTORY:   Social History     Tobacco Use     Smoking status: Former Smoker     Years: 17.00     Last attempt to quit: 1987     Years since quittin.7     Smokeless tobacco: Never Used   Substance Use Topics     Alcohol use: No         Review of Systems    14 point review of symptoms was performed and is negative except as noted above.       Physical Exam   BP: 99/77  Pulse: 72  Heart Rate: 73  Temp: 101.4  F (38.6  C)  Resp: 16  Height: 175.3 cm (5' 9\")  Weight: 108.9 kg (240 lb 1.6 oz)  SpO2: 93 %      Physical Exam   GEN: Lethargic, occasionally mumbles, airway protecting, eyes closed  HEENT: The head is normocephalic and atraumatic. Pupils are equal round and reactive to light, but small and symmetric bilaterally. There is no facial swelling.   CV: Regular rate and rhythm without murmurs rubs or gallops. 2+ radial pulses bilaterally.  PULM: Clear to auscultation bilaterally.  ABD: Obese, soft, no obvious guarding, nondistended.   EXT: Bilateral lower extremity edema, chronic appearing  nEURO: No obvious cranial nerve abnormalities, " patient able to follow commands.  Moves upper extremities purposefully.  GCS 1, 2, 5   SKIN: No acute rashes, ecchymosis, or lacerations        ED Course        Procedures  Results for orders placed during the hospital encounter of 02/28/19   POC US ECHO LIMITED    Impression Limited Bedside Cardiac Ultrasound, performed and interpreted by me.   Indication: Hypotension/shock.  Parasternal long axis and parasternal short axis views were acquired.   Image quality was fair.    Findings:    Global left ventricular function appears intact.  Chambers do not appear dilated.  There is no evidence of free fluid within the pericardium.  There is sclerosis along the aortic valve    IMPRESSION: Grossly intact left ventricular function and normal chamber size.  No pericardial effusion.  Sclerotic aortic valve             EKG at 21:29. Altered mental status at time of ECG.  ECG interpretted by Gregg Villela MD within 10 minutes of production  NSR at 73 bpm. Normal axis.  Normal intervals. Nl R-wave progress. No ST-T elevations or depressions. Pathologic T-wave inversion are absent    Interpretation: Normal ECG      The patient has signs of Severe Sepsis as evidenced by:    1. 2 SIRS criteria, AND  2. Suspected infection, AND   3. Organ dysfunction: Acute encephalopathy due to sepsis    Time severe sepsis diagnosis confirmed = 0029 as this was the time when Lactate resulted, and the level was > 1.9 and AMS with suspected infection and hypotension       3 Hour Severe Sepsis Bundle Completion:  1. Initial Lactic Acid Result:   Recent Labs   Lab Test 02/28/19  0336 02/28/19  0035 02/28/19  0021   LACT 0.6* 1.4 0.4*     2. Blood Cultures before Antibiotics: Yes  3. Broad Spectrum Antibiotics Administered: Yes     Anti-infectives (From now, onward)    Start     Dose/Rate Route Frequency Ordered Stop    02/28/19 0411  piperacillin-tazobactam (ZOSYN) 4.5 g vial to attach to  mL bag      4.5 g  over 1 Hours Intravenous EVERY 6 HOURS  02/28/19 0410      02/28/19 0300  vancomycin (VANCOCIN) 2,250 mg in sodium chloride 0.9 % 500 mL intermittent infusion      2,250 mg  over 2 Hours Intravenous ONCE 02/28/19 0248      02/28/19 0248  vancomycin place smith - receiving intermittent dosing      1 each Does not apply SEE ADMIN INSTRUCTIONS 02/28/19 0248          4. 2500 ml of IV fluids.  Pt weight is 100 kg, however she is in acute renal failure and there is significant obesity.     Severe Sepsis reassessment:  1. Repeat Lactic Acid Level: 0.6  2. MAP>65 after initial IVF bolus, will continue to monitor fluid status and vital signs  I attest to having performed a repeat sepsis exam and assessment of perfusion at 4:00am and the results demonstrate improved perfusion.             Labs Ordered and Resulted from Time of ED Arrival Up to the Time of Departure from the ED   CBC WITH PLATELETS DIFFERENTIAL - Abnormal; Notable for the following components:       Result Value    WBC 11.4 (*)     RBC Count 3.70 (*)     Hemoglobin 10.7 (*)     MCHC 29.3 (*)     Platelet Count 101 (*)     Absolute Neutrophil 10.5 (*)     Absolute Lymphocytes 0.4 (*)     All other components within normal limits   COMPREHENSIVE METABOLIC PANEL - Abnormal; Notable for the following components:    Potassium 6.3 (*)     Urea Nitrogen 70 (*)     Creatinine 2.27 (*)     GFR Estimate 21 (*)     GFR Estimate If Black 24 (*)     Albumin 3.2 (*)     All other components within normal limits   ROUTINE UA WITH MICROSCOPIC REFLEX TO CULTURE - Abnormal; Notable for the following components:    Blood Urine Large (*)     Protein Albumin Urine 100 (*)     Nitrite Urine Positive (*)     Leukocyte Esterase Urine Large (*)     WBC Urine 1,760 (*)     RBC Urine 64 (*)     WBC Clumps Present (*)     Bacteria Urine Moderate (*)     Squamous Epithelial /HPF Urine 21 (*)     Transitional Epi 2 (*)     All other components within normal limits   POTASSIUM WHOLE BLOOD - Abnormal; Notable for the following  components:    Potassium 6.4 (*)     All other components within normal limits   BASIC METABOLIC PANEL - Abnormal; Notable for the following components:    Potassium 6.0 (*)     Chloride 110 (*)     Carbon Dioxide 18 (*)     Urea Nitrogen 62 (*)     Creatinine 2.20 (*)     GFR Estimate 22 (*)     GFR Estimate If Black 25 (*)     All other components within normal limits   BLOOD GAS VENOUS - Abnormal; Notable for the following components:    Ph Venous 7.20 (*)     PCO2 Venous 51 (*)     Bicarbonate Venous 20 (*)     All other components within normal limits   LACTIC ACID WHOLE BLOOD - Abnormal; Notable for the following components:    Lactic Acid 0.6 (*)     All other components within normal limits   ISTAT  GASES LACTATE DONNA POCT - Abnormal; Notable for the following components:    Ph Venous 7.31 (*)     Lactic Acid 0.4 (*)     All other components within normal limits   ISTAT GASES ELEC ICA GLUC DONNA POCT - Abnormal; Notable for the following components:    Ph Venous 7.22 (*)     PCO2 Venous 57 (*)     PO2 Venous 23 (*)     Potassium 6.4 (*)     Hemoglobin 11.6 (*)     Hematocrit - POCT 34 (*)     All other components within normal limits   INR   LACTIC ACID WHOLE BLOOD   TROPONIN I   GLUCOSE MONITOR NURSING POCT   HEMOGLOBIN A1C   GLUCOSE MONITOR NURSING POCT   IP ASSIGN PROVIDER TEAM TO TREATMENT TEAM   VITAL SIGNS   PULSE OXIMETRY NURSING   TELEMETRY MONITORING MED/SURG   INTAKE AND OUTPUT   DAILY WEIGHTS   NEURO CHECKS   INCENTIVE SPIROMETRY NURSING   INDWELLING URINARY CATHETER (HODGES)   DISCONTINUE INDWELLING URINARY CATHETER (HODGES)   BLADDER SCAN   APPLY PNEUMATIC COMPRESSION DEVICE (PCD)   IP CARBOHYDRATE COUNTING BY NURSING   PATIENT EDUCATION   ASSESS FOR HYPOGLYCEMIA SYMPTOMS   NOTIFY PHYSICIAN   CARDIAC CONTINUOUS MONITORING   ASSESS FOR HYPOGLYCEMIA SYMPTOMS   NOTIFY PHYSICIAN   TROPONIN POCT   ISTAT GASES ELEC ICA GLUC DONNA POCT   URINE CULTURE AEROBIC BACTERIAL   BLOOD CULTURE   BLOOD CULTURE        Consults  Other (Comment): Responded(CLOVERS study) (02/28/19 5494)    Critical CARE time: 120 minutes of critical care time for patient with evidence of severe sepsis due to hypotension and altered mental status with suspected urinary source, comp,icated by ARF and hyperkalemiam, admitted to step down unit.       Assessments & Plan (with Medical Decision Making)   73F with MMP inc renal xplant, DM2 frequent UTIs p/w with AMS, fever, lethargy    ddx  Acute coronary syndrome, pulmonary embolism, pneumonia, pneumothorax, congestive heart failure, uremia, renal failure, among other causes    In the ED appears weak and lethargic. Intially hypotensive, but repeat BP prior to significant fluid replacement rebounded to SBP 150s. Febrile.    Pupils small, so given narcan but no improvement.     CT head without acute process such as ICH  ECG NSR w/o ishcemic  CXR clear exept for baseline cardiomegaly, mild.   Lactate 0.4, labs otherwise revealing for K 6.4, improved to 6.0 following hydration    Etiology of AMS likely sepsis from UTI given presentation, UA, and hx of similar events.     Sepsis  - fluids 30ml / kg  - Vanco / zosyn  - Bcx, UCx pending  - tylenol prn fever.     Initially hypertensive, no action taken, spont improved.     Airway  - appears to be protecting  - GCS 8, 11 on re-eval at 3:30am    Hyperkalemia and acute renal failure  - hydration  - calcium  - bicarb  - gluc/insulin  - tele    Dispo  - adm to medicine on 6B    I have reviewed the nursing notes.    I have reviewed the findings, diagnosis, plan and need for follow up with the patient.       Medication List      There are no discharge medications for this visit.         Final diagnoses:   Severe sepsis (H)   Acute renal failure, unspecified acute renal failure type (H)   Hyperkalemia   Urinary tract infection associated with indwelling urethral catheter, initial encounter (H)     IZan, am serving as a trained medical scribe to document  services personally performed by Gregg Villela MD, based on the provider's statements to me.   I, Gregg Villela MD, was physically present and have reviewed and verified the accuracy of this note documented by Zan Batres.    2/28/2019   [facility], [place], EMERGENCY DEPARTMENT     Gregg Villela MD  02/28/19 0426       Gregg Villela MD  02/28/19 0427

## 2019-02-28 NOTE — LETTER
Transition Communication Hand-off for Care Transitions to Next Level of Care Provider    Name: Gem Jade  : 1945  MRN #: [id number]  Primary Care Provider: Marivel Barcenas NP     Primary Clinic: [address] [facility] [address]  [facility] [address]     Reason for Hospitalization:    Hyperkalemia [E87.5]  Severe sepsis (H) [A41.9, R65.20]  Acute renal failure, unspecified acute renal failure type (H) [N17.9]  Urinary tract infection associated with indwelling urethral catheter, initial encounter (H) [T83.511A, N39.0]    Admit Date/Time: 2019 12:30 AM  Discharge Date: 3/5/2019    Payor Source: Payor: MEDICARE / Plan: MEDICARE / Product Type: Medicare /     Follow-up plan:    Future Appointments   Date Time Provider Department Center   3/18/2019  2:00 PM Marivel Barcenas, APRN CNP [facility]       Any outstanding tests or procedures:        Referrals     Future Labs/Procedures    Home care nursing referral     Comments:    [facility]  Phone  [phone]  Fax  [phone]     Resumption of Home Care Services    Your provider has ordered home care nursing services.   If you have not been contacted within 2 days of your discharge please call    Medication Therapy Management Referral     Comments:    MTM referral reason            Patient had a hospital or ED visit in last 6 months and has more than 10   PTA or Discharge medications    Patient has 5 PTA or Discharge Medications AND one of the following   diagnoses: DM,HF,COPD,AMI DX,PULM HTN       This service is designed to help you get the most from your medications.  A specially trained pharmacist will work closely with you and your doctors  to solve any problems related to your medications and to help you get the   best results from taking them.      The Medication Therapy Management staff will call you to schedule an appointment.            Key Recommendations:  Please review CATHY Wright RN, BSN, PHN  [facility]  Coordinator  [facility], [facility] and [facility]  Desk Phone: [phone]    AVS/Discharge Summary is the source of truth; this is a helpful guide for improved communication of patient story

## 2019-02-28 NOTE — PROGRESS NOTES
[facility] Progress Note - [facility]       Main Plans for Today   Continue vanc and zosyn    NS @ 125 ml/hr   Follow neuro exam   Transplant Nephrology Consult      Assessment & Plan   Gem Jaed is a 73 year old female admitted on 2/28/2019. She has a history of DM2, polyneuropathy, kidney transplant (1999) and recurrent UTIs secondary to indwelling rico complicated by toxic encephalopathy and is admitted for sepsis likely secondary to a urinary source and toxic metabolic encephalopathy.    #Acute encephalopathy likely due to sepsis   #Severe Sepsis likely due to urinary tract infection  S/p 3L NS, Zosyn and Vanc in ED. Febrile in ED with mild leukocytosis, normal lactate. Procal is moderate. Has AMS which has slowly improved with treatment of sepsis. Has a history of recurrent infections with indwelling rico, all E coli have been pan-sensitive in the past few months. Head CT shows no acute intracranial path. CXR shows mild enlargement of the cardiac silhouette similar to prior exam. POCUS cardiac shows grossly intact left ventricular function and sclerotic aortic valve. Changed rico in the ED. UA appears infectious with culture of urine and blood pending. Most likely this is sepsis from a urinary source.   -Continue Zosyn and Vanc  -Follow up urine and blood cultures  -Daily CBC  -Pulse ox, oxygen as needed  -Tele  - ml/hr      #Hyperkalemia  #History of renal transplant in 1999  #ROB, concern for Type IV RTA  Baseline creatinine appears to be between 1.2-1.5, Cr 2.2 on admission likely secondary to sepsis. S/p calcium gluconate in ED. Hyperkalemia likely due to ROB and hypoperfusion. Insulin and hydration K improved to 5.5.   -Consult transplant nephrology   -Consider Renal U/S if not improving   -PTA Tacrolimus in AM pending mental status improvement  -PTA prednisone 5 mg daily pending mental status improvement     #Type II DM  Home  regimen is novolog TID with meals and lantus 24 units nightly  -NPO    -Lantus 12 U at bedtime   -MSSI  -Glucose checks     #Hypertension   #paroxysymal A fib  Hold PO meds until AMS resolves  -As needed hydralazine IV while NPO  -PTA pravastatin  -PTA losartan  -PTA labetalol     #Hypothyroidism  Hold PO meds until AMS resolves  -PTA synthroid     #Neuropathy  Hold PO meds until AMS resolves  -PTA gabapentin  -PTA baclofen    # Pain Assessment:  Current Pain Score 2/28/2019   Patient currently in pain? denies   Pain score (0-10) -   Pain location -   Pain descriptors -   CPOT pain score -   Gem swan pain level was assessed and she currently denies pain.      Diet: NPO for Medical/Clinical Reasons Except for: No Exceptions  Fluids:  ml/hr   DVT Prophylaxis: Heparin SQ  Code Status: Full Code    Disposition Plan   Expected discharge: 4 - 7 days, recommended to prior living arrangement once antibiotic plan established and SIRS/Sepsis treated. Dispo:          Entered: Gregg Cevallos 02/28/2019, 11:56 AM   Information in the above section will display in the discharge planner report.      The patient's care was discussed with the Attending Physician, Dr. Manzanares.    Gregg Cevallos  [facility] Medicine  Pager: [phone]  Please see sticky note for cross cover information    Interval History   No acute events since admission. Mental status improving slowly. Denies pain. Further history or ROS is limited by AMS.     Physical Exam   Vital Signs: Temp: 100  F (37.8  C) Temp src: Axillary BP: 163/69 Pulse: 79 Heart Rate: 79 Resp: 12 SpO2: 100 % O2 Device: Oxymask(Desats while sleeping) Oxygen Delivery: 5 LPM  Weight: 240 lbs 1.6 oz  General Appearance: Sleepy, arouses to voice, no distress, comfortable   Respiratory: lungs clear to auscultation without rales, rhonchi, wheeze, no respiratory distress   Cardiovascular: regular rate and rhythm, no murmur, no nuno, no rub   GI: obese,  soft, non-distended, no guarding   Skin: warm, dry   Other: GCS 10. Patient arouses to voice and follows motor commands. Answers yes no questions by shaking her head but no verbal response.        Data

## 2019-02-28 NOTE — H&P
[facility], [facility] History and Physical        Date of Admission:  2/28/2019  Date of Service: 2/28/2019         HPI      Chief Complaint confusion    Unable to obtain a history from the patient due to mental status    History of Present Illness   Gem Jade is a 73 year old female with a past medical history significant for DM2, polyneuropathy, kidney transplant (1999) and recurrent UTIs secondary to indwelling rico complicated by toxic encephalopathy who presents to the Emergency Department today via EMS with an altered mental status. Patient has had AMS since 12 PM on 2/27 per RN (RN talked to  earlier, home RN came by their house and got urine sample and labs,  was awaiting results apparently) prior to bringing patient in to ED for further eval.  Concern of UTI for several days per chart review and urinalysis had been sent with anticipated oncoming treatment course. Chart review shows last admission in Sept 2018 due to AMS and urosepsis. When looking through past notes, patient has been bed bound for years.      In the ED, the patient has minimal but not purposeful response to voice, does purposefully move her extremities but is unable to participate in any history gathering. Patient was given a dose of Narcan without response. S/p 2 L bolus in ED. Started Vanc/ Zosyn for antibiotics. K was 6.4, calcium IV given. Cr to 2.27 (baseline around 1.6), lactate is 1.4 normal and later 0.4. Trop within normal limits x2. Blood gas shows 7.22 pH and 57 pCO2, pO2 23. Leukocytosis of 11.4 and hgb of 11.6, platelets 101. INR 1. UA shows large LE, large blood, 139 WBC and 159 RBCs cathed. Urine cx pending, blood culture x2 pending. CXR shows mild cardiomegaly, no consolidations. CT head shows no acute processes. POCUS cardiac shows grossly normal function. Rico was changed in the ED.     When I evaluated the patient, she did not respond to verbal  "or tactile stimuli. Her GCS was 8, somewhat improved from initial ambulance assessment with score of 6. Spouse was not at bedside. Per RN,  says this AMS and urosepsis happens \"1-2 times a year\".        History:   Review of Systems   Review of Systems   Unable to perform ROS: Mental status change     Past Medical History    I have reviewed this patient's medical history and updated it with pertinent information if needed.   Past Medical History:   Diagnosis Date     Background diabetic retinopathy(362.01)     extensive diabetic retinopathy, s/p multiple laser treatments     Diabetic gastroparesis (H)     followed by [facility] (Dr. 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)      Urinary tract infection, site not specified     Chronic UTIs     UTI (urinary tract infection) due to urinary indwelling catheter (H)      Past Surgical History   I have reviewed this patient's surgical history and updated it with pertinent information if needed.  Past Surgical History:   Procedure Laterality Date     C NONSPECIFIC PROCEDURE  10/99    kidney transplant     C NONSPECIFIC PROCEDURE  8/00    MRI head, lumbar spine, pelvis     C TOTAL KNEE ARTHROPLASTY  3/03    Knee Replacement, Total right, post op PE     CHOLECYSTECTOMY       HC LEFT HEART CATHETERIZATION  1999    Cardiac Cath, Left Heart, Negative  (@ [facility])     HC LEFT HEART CATHETERIZATION  4/05    normal coronary angiogram at [facility], had mild troponin rise (0.71)     HC REMV CATARACT EXTRACAP,INSERT LENS  "     bilateral cataract repaired, left eye       Social History   Social History     Tobacco Use     Smoking status: Former Smoker     Years: 17.00     Last attempt to quit: 1987     Years since quittin.7     Smokeless tobacco: Never Used   Substance Use Topics     Alcohol use: No     Drug use: No     Family History   I have reviewed this patient's family history and updated it with pertinent information if needed.   Family History   Problem Relation Age of Onset     Diabetes Mother      Parkinsonism Mother      Diabetes Brother      Hypertension Father         [id number] pneumonia     Heart Disease Father      Prior to Admission Medications   Prior to Admission Medications   Prescriptions Last Dose Informant Patient Reported? Taking?   ACETAMINOPHEN PO  Spouse/Significant Other Yes No   Sig: Take 325-650 mg by mouth every 4 hours as needed.   Cranberry 400 MG TABS  Spouse/Significant Other Yes No   Sig: Take 400 mg by mouth 2 times daily   Lactobacillus Rhamnosus, GG, (CULTURELL) capsule  Spouse/Significant Other No No   Sig: Take 1 capsule by mouth 2 times daily   VITAMIN D, CHOLECALCIFEROL, PO  Spouse/Significant Other Yes No   Sig: Take 4,000 Units by mouth 2 times daily   aspirin 81 MG tablet  Spouse/Significant Other Yes No   Sig: Take 81 mg by mouth daily   baclofen (LIORESAL) 10 MG tablet   No No   Sig: Take 1 tablet (10 mg) by mouth 2 times daily as needed for other (Bladder Spasms)   blood glucose (ACCU-CHEK COMPACT PLUS) test strip   No No   Sig: USE TO TEST BLOOD SUGAR THREE TIMES A DAY   cyanocobalamin (VITAMIN B12) 1000 MCG/ML injection   No No   Sig: Inject 1 mL (1,000 mcg) into the muscle every 30 days   diphenoxylate-atropine (LOMOTIL) 2.5-0.025 MG tablet   No No   Sig: Take 1 tablet by mouth 4 times daily as needed for diarrhea   famotidine (PEPCID) 20 MG tablet  Spouse/Significant Other Yes No   Sig: Take 1 tablet (20 mg) by mouth 2 times daily   gabapentin (NEURONTIN) 300 MG capsule    "No No   Sig: Take 2 capsules (600 mg) by mouth 2 times daily May take an additional 300mg at bedtime daily with increase pain   hydrocortisone 1%/eucerin 1% compounded cream  Spouse/Significant Other No No   Sig: Apply topically 2 times daily   Patient taking differently: Apply topically 2 times daily as needed    insulin aspart (NOVOLOG FLEXPEN) 100 UNIT/ML pen   Yes No   Sig: Take 10 units with meals twice daily with sliding scale. Max 40 units per day  Sliding scale:   140-239=2 units  240-339=4 units  340-439=6 units  >440=8 units   insulin glargine (LANTUS SOLOSTAR PEN) 100 UNIT/ML pen   Yes No   Sig: Inject 24 Units Subcutaneous At Bedtime   insulin pen needle (COMFORT EZ PEN NEEDLES) 31G X 6 MM   No No   Sig: Use 4 pen needles daily or as directed.   labetalol (NORMODYNE) 300 MG tablet   Yes No   Sig: Take 0.5 tablets (150 mg) by mouth 2 times daily   levETIRAcetam (KEPPRA) 750 MG tablet   No No   Sig: Take 1 tablet (750 mg) by mouth 2 times daily   levothyroxine (SYNTHROID/LEVOTHROID) 50 MCG tablet   No No   Sig: TAKE ONE TABLET BY MOUTH EVERY DAY   lidocaine (XYLOCAINE) 2 % topical gel  Spouse/Significant Other No No   Sig: Apply topically as needed for moderate pain 10 ML every 3 weeks   loratadine (CLARITIN) 10 MG tablet   No No   Sig: Take 1 tablet (10 mg) by mouth daily   losartan (COZAAR) 25 MG tablet   Yes No   Sig: Take 1 tablet (25 mg) by mouth daily   ondansetron (ZOFRAN ODT) 4 MG ODT tab   No No   Sig: Take 1 tablet (4 mg) by mouth daily as needed for nausea 30 minutes before getting up in your wheelchair   order for DME   No No   Sig: Equipment being ordered: Snehta gel-T cushion 20 inches wide & 18 inches deep    height 1.676 m (5' 6\"), weight 101.6 kg (223 lb 15.8 oz)   order for DME   No No   Sig: Equipment being ordered: Tegaderm 4x4   MICHELLE 6 months  Sacral wound   pravastatin (PRAVACHOL) 10 MG tablet   No No   Sig: Take 1 tablet (10 mg) by mouth daily   predniSONE (DELTASONE) 5 MG " tablet   No No   Sig: Take 1 tablet (5 mg) by mouth daily   simethicone (MYLICON) 125 MG CHEW  Spouse/Significant Other Yes No   Sig: Take 1 tablet (125 mg) by mouth as needed for intestinal gas   tacrolimus (GENERIC EQUIVALENT) 1 MG capsule   No No   Sig: Take 2 capsules (2 mg) by mouth 2 times daily      Facility-Administered Medications: None     Allergies   Allergies   Allergen Reactions     Bactrim Ds [Sulfamethoxazole W/Trimethoprim]      Creatinine level increased     Atorvastatin Calcium      myalgias, muscle aches     Codeine Nausea and Vomiting     Darvocet [Propoxyphene N-Apap] Nausea and Vomiting     Hydromorphone      Levofloxacin Other (See Comments) and Diarrhea     hallucinations     Morphine      Nausea and vomiting     Niaspan [Niacin] GI Disturbance     Flushing even at low dose, GI upset     Percocet [Oxycodone-Acetaminophen]      Vomiting after taking med couple of times     Vicodin [Hydrocodone-Acetaminophen] Nausea and Vomiting         Physical Exam    Vital Signs: Temp: 101.4  F (38.6  C) Temp src: Rectal BP: 194/89 Pulse: 83 Heart Rate: 83 Resp: 11 SpO2: 100 % O2 Device: None (Room air)    Weight: 0 lbs 0 oz    Physical Exam   Constitutional: She appears lethargic. No distress.   HENT:   Head: Normocephalic and atraumatic.   Cardiovascular: Normal rate, regular rhythm, normal heart sounds and intact distal pulses.   No murmur heard.  Pulmonary/Chest: Effort normal and breath sounds normal. No stridor. No respiratory distress. She has no wheezes.   satting well on 1.5 L O2 NC    Abdominal: Soft. Bowel sounds are normal. She exhibits no distension. There is no tenderness.   Musculoskeletal: She exhibits edema (bilateral LE edema).   Extremities cool to the touch   Neurological: She appears lethargic. GCS eye subscore is 3. GCS verbal subscore is 2. GCS motor subscore is 3.   Skin: Capillary refill takes 2 to 3 seconds. No rash noted. She is not diaphoretic.     Assessment & Plan    Gem MUÑIZ  Kalie is a 73 year old female admitted on 2/28/2019. She has a history of DMII, polyneuropathy, kidney transplant in 1999, and recurrent UTIs secondary to indwelling rico and is admitted for urosepsis with altered mental status. Last admission in 09/18 was due to AMS and urosepsis as well.     #Acute encephalopathy likely due to sepsis   #Severe Sepsis likely due to urinary tract infection  #Respiratory acidosis 2/2 hypoventilation  S/p 2 L bolus, Zosyn and Vanc in ED. Febrile in ED with leukocytosis, normal lactate. Has a history of recurrent infections with indwelling rico, all E coli have been pan-sensitive in the past. She was started on cefepime on admission in September 2018 and transitioned to cefpodoxime given pan-sensitive E.coli on urine culture and no positive E.coli blood cultures. Head CT shows no acute intracranial path. CXR shows mild enlargement of the cardiac silhouette similar to prior exam. POCUS cardiac shows grossly intact left ventricular function and sclerotic aortic valve. Changed rico in the ED.   -Continue Zosyn and Vanc  -Repeat VBG stat shows slight worsening pH of 7.2, CO2 starting to normalize and O2 normalized, will repeat at 6 AM  -Follow up urine and blood cultures  -Daily CBC  -Pulse ox, oxygen as needed  -Tele  -MIVF  -Serial exams to monitor resp status, no need for intubation now    #Hyperkalemia  #History of renal transplant in 1999  #ROB, concern for Type IV RTA  Baseline creatinine appears to be between 1.2-1.5, Cr 2.2 on admission likely secondary to sepsis. S/p calcium gluconate in ED. Hyperkalemia likely due to ROB and hypoperfusion.  EKG per report shows NSR with no ischemia or infarction, ED to upload EKG into system.   -Consult transplant nephrology in AM  -Recheck K in ED is 6.0, giving 5 units of insulin in ED and will give another 2 g calcium gluconate now  -Mag and phos in AM  -Consider Renal U/S if minimal improvement  -PTA Tacrolimus in AM pending mental status  improvement  -PTA prednisone 5 mg daily pending mental status improvement    #Type II DM  Home regimen is novolog TID with meals and lantus 24 units nightly  -NPO overnight due to AMS  -takes 24 units of lantus at bedtime, will half overnight to 12 units  -MSSI  -Glucose checks    #Hypertension   #paroxysymal A fib  Hold PO meds until AMS resolves  -As needed hydralazine IV while NPO  -PTA pravastatin  -PTA losartan  -PTA labetalol    #Hypothyroidism  Hold PO meds until AMS resolves  -PTA synthroid    #Neuropathy  Hold PO meds until AMS resolves  -PTA gabapentin  -PTA baclofen    # Pain Assessment:  Current Pain Score 9/19/2018   Patient currently in pain? yes   Pain score (0-10) -   Pain location Back   Pain descriptors Aching   - Gem is unable to participate in a collaborative plan for pain management due to AMS with sepsis.   - Please see the plan for pain management as documented above    Diet: No diet orders on file NPO  Fluids: MIVF 125 ml/hr NS  DVT Prophylaxis: Pneumatic Compression Devices  Code Status: Full Code    Disposition Plan   Expected discharge: 2 - 3 days; recommended to prior living arrangement once adequate pain management/ tolerating PO medications, antibiotic plan established, mental status at baseline and SIRS/Sepsis treated. Dispo:       Entered: Shay Fernando 02/28/2019, 2:19 AM   Information in the above section will display in the discharge planner report.    The patient was discussed with Dr. Kahn.     Shay Fernando MD  [facility]  [facility]   Pager: [phone]  Please see sticky note for cross cover information    Results:     Data   Recent Labs   Lab 02/28/19  0038 02/28/19  0035 02/28/19  0018 02/27/19  1900   WBC  --  11.4*  --  9.9   HGB 11.6* 10.7*  --  10.0*   MCV  --  99  --  94   PLT  --  101*  --  113*   INR  --  1.09  --   --     135  --   --    POTASSIUM 6.4* 6.3*  6.4*  --   --    CHLORIDE  --   108  --   --    CO2  --  23  --   --    BUN  --  70*  --   --    CR  --  2.27*  --   --    ANIONGAP  --  5  --   --    ROGER  --  8.6  --   --    GLC 90 90  --   --    ALBUMIN  --  3.2*  --   --    PROTTOTAL  --  7.8  --   --    BILITOTAL  --  0.4  --   --    ALKPHOS  --  103  --   --    ALT  --  31  --   --    AST  --  31  --   --    TROPI  --  <0.015  --   --    TROPONIN  --   --  0.00  --      Recent Results (from the past 24 hour(s))   POC US ECHO LIMITED    Impression    Limited Bedside Cardiac Ultrasound, performed and interpreted by me.   Indication: Hypotension/shock.  Parasternal long axis and parasternal short axis views were acquired.   Image quality was fair.    Findings:    Global left ventricular function appears intact.  Chambers do not appear dilated.  There is no evidence of free fluid within the pericardium.  There is sclerosis along the aortic valve    IMPRESSION: Grossly intact left ventricular function and normal chamber size.  No pericardial effusion.  Sclerotic aortic valve     CT Head w/o Contrast    Narrative         Impression    Impression: No acute intracranial pathology.   XR Chest Port 1 View    Impression    Impression:   1. No acute cardiopulmonary findings.  2. Mild enlargement of the cardiac silhouette similar to prior exam.

## 2019-02-28 NOTE — PHARMACY-VANCOMYCIN DOSING SERVICE
Pharmacy Vancomycin Initial Note  Date of Service 2019  Patient's  1945  73 year old, female    Indication: Sepsis    Current estimated CrCl = Estimated Creatinine Clearance: 29.5 mL/min (A) (based on SCr of 2.27 mg/dL (H)).    Creatinine for last 3 days  2019: 12:35 AM Creatinine 2.27 mg/dL    Recent Vancomycin Level(s) for last 3 days  No results found for requested labs within last 72 hours.      Vancomycin IV Administrations (past 72 hours)      No vancomycin orders with administrations in past 72 hours.                Nephrotoxins and other renal medications (From now, onward)    Start     Dose/Rate Route Frequency Ordered Stop    19 0029  piperacillin-tazobactam (ZOSYN) 3.375 g vial to attach to  mL bag      3.375 g  over 30 Minutes Intravenous ONCE 19 0028            Contrast Orders - past 72 hours (72h ago, onward)    None                Plan:  1.  Start vancomycin  2250 mg IV once, then follow levels.   2.  Goal Trough Level: 15-20 mg/L   3.  Pharmacy will check trough levels as appropriate in 1-3 Days.    4. Serum creatinine levels will be ordered daily for the first week of therapy and at least twice weekly for subsequent weeks.    5. [place] method utilized to dose vancomycin therapy: Method 2    Taqueria Pizarro

## 2019-02-28 NOTE — PROGRESS NOTES
[facility]  Patient is currently open to home care services with [facility].  The patient is currently receiving RN/HA services.  [facility]  and team have been notified of patient admission.  [facility] liaison will continue to follow patient during stay.  If appropriate provide orders to resume home care at time of discharge.    Thank you  Renate Wilson RN, BSN  [facility] Liaison  [facility]  [phone]

## 2019-02-28 NOTE — ED TRIAGE NOTES
Pt brought in by EMS for being unresponsive at times since noon on the 27th per ems report. Pt has a UA pending by home health nurse. 0.4 of narcan given on arrival to ED by staffed RN.

## 2019-02-28 NOTE — CONSULTS
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   She has recurrent UTI and marginal renal function with US shows renal graft cortical thinning, likely failing kidney transplant and poor graft function,       UTI : now on vanc and zosyn , renally dose, please f/u c/s and make adjustment to abx,   -- once her hyperkalemia and ROB resolved after discharge will consider startring  back on bactrim since its not a true allergy consider bactrim daily or renally dose for ppx pjp and uti,  - or will consider fosfomycin monthly might need follow up with transplant ID vs urology.      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     # Immunosuppression: Tacrolimus immediate release (goal  4-6) and Prednisone (dose  5 mg daily)   - Changes: No    # Hypertension/Volume Status: Borderline control; Goal BP: < 140/90   - Changes: No    # Diabetes: Controlled    # Anemia in chronic renal disease: Hgb: Stable   - Iron studies: Not checked recently    # Mineral Bone Disorder:    - Secondary renal hyperparathyroidism; PTH level is: Minimally elevated  - Vitamin D; level is: Not checked recently  - Calcium; level is: Normal  -  Phosphorus; level is: Normal    # Electrolytes:   - Potassium; level: High hyperkalemia   - Magnesium; level: High  - Bicarbonate; level: Normal      # Skin Cancer Risk:    - Discussed sun protection and recommend regular follow up with Dermatology.    # Medical Compliance: Yes     # other medical issue: AMS encephalopathy related to sepsis UTI, she hads recurrent UTI management per primary team.     # Transplant History:  Etiology of kidney failure: diabetic nephropathy, DM2  Tx: LDKT  Transplant: 10/27/1999 (Kidney)  Donor Type: Living Donor Class:   Crossmatch at time of Tx: unknown   DSA at time of Tx: No unknown  Significant changes in immunosuppression: yes due to proteinuria and FSGS sirolimus stopped   Significant transplant-related complications: recurrent UTIs    Recommendations were communicated to the primary team verbally.    Seen and discussed with Dr. Av Carpio MD  Pager: [phone]    REASON FOR CONSULT   Kidney transplant and ROB     History of Present Illness   Gem Jade is a 73 year old female with end stage kidney disease secondary to diabetes type 2 s/p LDKT 1999 with post transplant recurrent UTI , she has multiple hospital admission related to UTI , ecoli and citrobacter, she is in chronic indwelling rico cathter,  she usually present with encephalopathy due to uti and sepsis, she also has poor graft function and nephrotic range proteinuria renal biopsy was done that shows FSGS, her immunosuppression meds was change to tacro and pred and sirolimus was stopped, she is now presented to ED with AMS encephalopathy febrile and confused, found to have  Elevated wbc and her urine shows sign of UTI, she is admitted and started on vancomycin and zosyn. Labs also shows hyperkalemia which was shifted and calcium gluconate. She is very confused now unable to obtain ROS    Review of Systems    Review of systems not obtained due to patient factors - confusion and mental status    Past  Medical History    I have reviewed this patient's medical history and updated it with pertinent information if needed.   Past Medical History:   Diagnosis Date     Background diabetic retinopathy(362.01)     extensive diabetic retinopathy, s/p multiple laser treatments     Diabetic gastroparesis (H)     followed by [facility] (Dr. 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  Past Surgical History:   Procedure Laterality Date     C NONSPECIFIC PROCEDURE  10/99    kidney transplant     C NONSPECIFIC PROCEDURE  8/00    MRI head, lumbar spine, pelvis     C TOTAL KNEE ARTHROPLASTY  3/03    Knee Replacement, Total right, post op PE     CHOLECYSTECTOMY       HC LEFT HEART CATHETERIZATION  1999    Cardiac Cath, Left Heart, Negative  (@ [facility])     HC LEFT HEART CATHETERIZATION  4/05    normal coronary angiogram at [facility], had mild troponin rise (0.71)     HC REMV CATARACT EXTRACAP,INSERT LENS  8/04    bilateral cataract repaired, left eye        Social History   I have reviewed this patient's social history and updated it with pertinent information if needed. Gem ANTONINO Jade  reports that she quit smoking about 31 years ago. She quit after 17.00 years of use. she has never used smokeless tobacco.  She reports that she does not drink alcohol or use drugs.    Family History   I have reviewed this patient's family history and updated it with pertinent information if needed.   Family History   Problem Relation Age of Onset     Diabetes Mother      Parkinsonism Mother      Diabetes Brother      Hypertension Father         [id number] pneumonia     Heart Disease Father        Prior to Admission Medications   Prior to Admission Medications   Prescriptions Last Dose Informant Patient Reported? Taking?   ACETAMINOPHEN PO  Spouse/Significant Other Yes No   Sig: Take 325-650 mg by mouth every 4 hours as needed.   Cranberry 400 MG TABS  Spouse/Significant Other Yes No   Sig: Take 400 mg by mouth 2 times daily   Lactobacillus Rhamnosus, GG, (CULTURELL) capsule  Spouse/Significant Other No No   Sig: Take 1 capsule by mouth 2 times daily   VITAMIN D, CHOLECALCIFEROL, PO  Spouse/Significant Other Yes No   Sig: Take 4,000 Units by mouth 2 times daily   aspirin 81 MG tablet  Spouse/Significant Other Yes No   Sig: Take 81 mg by mouth daily   baclofen (LIORESAL) 10 MG tablet   No No   Sig: Take 1 tablet (10 mg) by mouth 2 times daily as needed for other (Bladder Spasms)   blood glucose (ACCU-CHEK COMPACT PLUS) test strip   No No   Sig: USE TO TEST BLOOD SUGAR THREE TIMES A DAY   cyanocobalamin (VITAMIN B12) 1000 MCG/ML injection   No No   Sig: Inject 1 mL (1,000 mcg) into the muscle every 30 days   diphenoxylate-atropine (LOMOTIL) 2.5-0.025 MG tablet   No No   Sig: Take 1 tablet by mouth 4 times daily as needed for diarrhea   famotidine (PEPCID) 20 MG tablet  Spouse/Significant Other Yes No   Sig: Take 1 tablet (20 mg) by mouth 2 times daily   gabapentin (NEURONTIN) 300 MG capsule   No No   Sig: Take 2 capsules (600 mg) by mouth 2 times daily May take an additional 300mg at bedtime daily with increase pain   hydrocortisone 1%/eucerin 1% compounded cream  Spouse/Significant Other No No   Sig: Apply topically 2 times daily  "  Patient taking differently: Apply topically 2 times daily as needed    insulin aspart (NOVOLOG FLEXPEN) 100 UNIT/ML pen   Yes No   Sig: Take 10 units with meals twice daily with sliding scale. Max 40 units per day  Sliding scale:   140-239=2 units  240-339=4 units  340-439=6 units  >440=8 units   insulin glargine (LANTUS SOLOSTAR PEN) 100 UNIT/ML pen   Yes No   Sig: Inject 24 Units Subcutaneous At Bedtime   insulin pen needle (COMFORT EZ PEN NEEDLES) 31G X 6 MM   No No   Sig: Use 4 pen needles daily or as directed.   labetalol (NORMODYNE) 300 MG tablet   Yes No   Sig: Take 0.5 tablets (150 mg) by mouth 2 times daily   levETIRAcetam (KEPPRA) 750 MG tablet   No No   Sig: Take 1 tablet (750 mg) by mouth 2 times daily   levothyroxine (SYNTHROID/LEVOTHROID) 50 MCG tablet   No No   Sig: TAKE ONE TABLET BY MOUTH EVERY DAY   lidocaine (XYLOCAINE) 2 % topical gel  Spouse/Significant Other No No   Sig: Apply topically as needed for moderate pain 10 ML every 3 weeks   loratadine (CLARITIN) 10 MG tablet   No No   Sig: Take 1 tablet (10 mg) by mouth daily   losartan (COZAAR) 25 MG tablet   Yes No   Sig: Take 1 tablet (25 mg) by mouth daily   ondansetron (ZOFRAN ODT) 4 MG ODT tab   No No   Sig: Take 1 tablet (4 mg) by mouth daily as needed for nausea 30 minutes before getting up in your wheelchair   order for DME   No No   Sig: Equipment being ordered: TinyTap gel-T cushion 20 inches wide & 18 inches deep    height 1.676 m (5' 6\"), weight 101.6 kg (223 lb 15.8 oz)   order for DME   No No   Sig: Equipment being ordered: Tegaderm 4x4   MICHELLE 6 months  Sacral wound   pravastatin (PRAVACHOL) 10 MG tablet   No No   Sig: Take 1 tablet (10 mg) by mouth daily   predniSONE (DELTASONE) 5 MG tablet   No No   Sig: Take 1 tablet (5 mg) by mouth daily   simethicone (MYLICON) 125 MG CHEW  Spouse/Significant Other Yes No   Sig: Take 1 tablet (125 mg) by mouth as needed for intestinal gas   tacrolimus (GENERIC EQUIVALENT) 1 MG capsule   No " "No   Sig: Take 2 capsules (2 mg) by mouth 2 times daily      Facility-Administered Medications: None     Allergies   Allergies   Allergen Reactions     Bactrim Ds [Sulfamethoxazole W/Trimethoprim]      Creatinine level increased     Atorvastatin Calcium      myalgias, muscle aches     Codeine Nausea and Vomiting     Darvocet [Propoxyphene N-Apap] Nausea and Vomiting     Hydromorphone      Levofloxacin Other (See Comments) and Diarrhea     hallucinations     Morphine      Nausea and vomiting     Niaspan [Niacin] GI Disturbance     Flushing even at low dose, GI upset     Percocet [Oxycodone-Acetaminophen]      Vomiting after taking med couple of times     Vicodin [Hydrocodone-Acetaminophen] Nausea and Vomiting       Physical Exam   Temp  Av  F (37.8  C)  Min: 99.5  F (37.5  C)  Max: 101.4  F (38.6  C)      Pulse  Av.7  Min: 72  Max: 83 Resp  Av.7  Min: 9  Max: 16  SpO2  Av.6 %  Min: 83 %  Max: 100 %     /69   Pulse 79   Temp 100  F (37.8  C)   Resp 12   Ht 1.753 m (5' 9\")   Wt 108.9 kg (240 lb 1.6 oz)   SpO2 100%   BMI 35.46 kg/m     Date 19 0700 - 19 0659   Shift 2237-1076 1185-5357 6410-2942 24 Hour Total   INTAKE   I.V. 237.5   237.5   Shift Total(mL/kg) 237.5(2.18)   237.5(2.18)   OUTPUT   Urine 515   515   Shift Total(mL/kg) 515(4.73)   515(4.73)   Weight (kg) 108.91 108.91 108.91 108.91      Admit Weight: 108.9 kg (240 lb 1.6 oz)     GENERAL APPEARANCE: morbidly obese, confused and encephalopathic not answering any question.  HENT: mouth without ulcers or lesions  LYMPHATICS: no cervical or supraclavicular nodes  RESP: lungs clear to auscultation - no rales, rhonchi or wheezes  CV: regular rhythm, normal rate, no rub, no murmur  EDEMA: no LE edema bilaterally  ABDOMEN: soft, nondistended, nontender, bowel sounds normal  MS: extremities normal - no gross deformities noted, no evidence of inflammation in joints, no muscle tenderness  SKIN: no rash, chronic rico " cathter     Data   CMP  Recent Labs   Lab 02/28/19  0612 02/28/19 0336 02/28/19 0038 02/28/19 0035    137 136 135   POTASSIUM 5.5* 6.0* 6.4* 6.3*  6.4*   CHLORIDE 112* 110*  --  108   CO2 20 18*  --  23   ANIONGAP 7 8  --  5   * 99 90 90   BUN 62* 62*  --  70*   CR 2.26* 2.20*  --  2.27*   GFRESTIMATED 21* 22*  --  21*   GFRESTBLACK 24* 25*  --  24*   ROGER 8.4* 8.6  --  8.6   MAG 2.6*  --   --   --    PHOS 3.7  --   --   --    PROTTOTAL  --   --   --  7.8   ALBUMIN  --   --   --  3.2*   BILITOTAL  --   --   --  0.4   ALKPHOS  --   --   --  103   AST  --   --   --  31   ALT  --   --   --  31     CBC  Recent Labs   Lab 02/28/19  1041 02/28/19 0612 02/28/19 0038 02/28/19 0035 02/27/19 1900   HGB  --  9.8* 11.6* 10.7* 10.0*   WBC  --  12.8*  --  11.4* 9.9   RBC  --  3.35*  --  3.70* 3.46*   HCT  --  33.0*  --  36.5 32.4*   MCV  --  99  --  99 94   MCH  --  29.3  --  28.9 28.9   MCHC  --  29.7*  --  29.3* 30.9*   RDW  --  14.0  --  13.9 14.1   * 109*  --  101* 113*     INR  Recent Labs   Lab 02/28/19  0035   INR 1.09     ABG  Recent Labs   Lab 02/28/19 0612 02/28/19  0336   O2PER 96.0 95.0      Urine Studies  Recent Labs   Lab Test 02/28/19  0215 02/27/19  1900 01/24/19 2010 01/04/19  1300  01/11/18  1614 12/19/17  1620 11/13/17  1646   COLOR Yellow Yellow Light Yellow Light Yellow   < > Yellow Pink Yellow   APPEARANCE Cloudy Slightly Cloudy Cloudy Cloudy   < > Turbid Slightly Cloudy Turbid   URINEGLC Negative Negative 30* Negative   < > 100* 100* 500*   URINEBILI Negative Negative Negative Negative   < > Negative Negative Negative   URINEKETONE Negative Negative Negative Negative   < > Negative Negative Negative   SG 1.015 1.010 1.012 1.012   < > 1.025 1.015 1.015   UBLD Large* Large* Moderate* Moderate*   < > Large* Large* Moderate*   URINEPH 5.5 6.0 5.5 6.0   < > 6.0 6.5 6.5   PROTEIN 100* 100* 100* 100*   < > >=300* 100* 100*   UROBILINOGEN  --   --   --   --   --  0.2 0.2 0.2   NITRITE  Positive* Negative Negative Negative   < > Positive* Negative Negative   LEUKEST Large* Large* Large* Large*   < > Large* Large* Small*   RBCU 64* 159* 93* 91*   < > 5-10* 25-50* 25-50*   WBCU 1,760* 139* >182* >182*   < > >100* * >100*    < > = values in this interval not displayed.     Recent Labs   Lab Test 10/10/18  1503 09/17/18  2040 04/24/17  1930 10/07/11  1230 09/27/11  1600   UTPG 5.27* 2.96* 15.28* Test canceled by PCU/Clinic  Charge credited PER RADHA ROBERTSENEY 0.07     PTH  Recent Labs   Lab Test 09/18/18  0627 03/11/17  0859 02/16/17  2303 10/07/11  1215 09/27/11  1600   PTHI 104* 334* 9* Test canceled by PCU/Clinic  Charge credited PER RADHA FALCON 53     Iron Studies  Recent Labs   Lab Test 02/17/17  2358 09/04/16  0815 08/07/16  0750 08/09/12  0819 07/14/12  0720 05/08/12 10/07/11  1215 09/27/11  1600   IRON 50 124 12* 68 154 68 Test canceled by PCU/Clinic  Charge credited CANCELED PER RADHA FALCON CORRECTED ON 10/07 AT 1459: PREVIOUSLY REPORTED AS 69 26*   * 128* 166* 188* 169*  --  Test canceled by PCU/Clinic  Charge credited CANCELED PER RADHA FALCON CORRECTED ON 10/07 AT 1459: PREVIOUSLY REPORTED  227*   IRONSAT 29 97* 7* 36 91*  --  Test canceled by PCU/Clinic  Charge credited CANCELED PER RADHA FALCON CORRECTED ON 10/07 AT 1459: PREVIOUSLY REPORTED AS 30 12*   GIANA 1,069* 2,676* 645*  --  444* 630?  --  72       IMAGING:  All imaging studies reviewed by me.

## 2019-02-28 NOTE — PROGRESS NOTES
Clinic Care Coordination Contact    Situation: Patient chart reviewed by care coordinator.    Background: notified of patient's hospital admission for altered mental status r/t severe urosepsis    Assessment: as above    Plan/Recommendations: will continue to monitor for discharge    Porsha Khalil R.N.  Clinic Care Coordinator  [facility]  [phone]

## 2019-03-01 ENCOUNTER — APPOINTMENT (OUTPATIENT)
Dept: GENERAL RADIOLOGY | Facility: CLINIC | Age: 74
DRG: 698 | End: 2019-03-01
Attending: STUDENT IN AN ORGANIZED HEALTH CARE EDUCATION/TRAINING PROGRAM
Payer: MEDICARE

## 2019-03-01 LAB
ANION GAP SERPL CALCULATED.3IONS-SCNC: 10 MMOL/L (ref 3–14)
ANION GAP SERPL CALCULATED.3IONS-SCNC: 11 MMOL/L (ref 3–14)
ANION GAP SERPL CALCULATED.3IONS-SCNC: 12 MMOL/L (ref 3–14)
ANION GAP SERPL CALCULATED.3IONS-SCNC: 14 MMOL/L (ref 3–14)
ANION GAP SERPL CALCULATED.3IONS-SCNC: 8 MMOL/L (ref 3–14)
ANION GAP SERPL CALCULATED.3IONS-SCNC: 9 MMOL/L (ref 3–14)
BACTERIA SPEC CULT: ABNORMAL
BACTERIA SPEC CULT: ABNORMAL
BUN SERPL-MCNC: 50 MG/DL (ref 7–30)
BUN SERPL-MCNC: 55 MG/DL (ref 7–30)
BUN SERPL-MCNC: 56 MG/DL (ref 7–30)
BUN SERPL-MCNC: 56 MG/DL (ref 7–30)
BUN SERPL-MCNC: 58 MG/DL (ref 7–30)
BUN SERPL-MCNC: 58 MG/DL (ref 7–30)
CALCIUM SERPL-MCNC: 8.2 MG/DL (ref 8.5–10.1)
CALCIUM SERPL-MCNC: 8.3 MG/DL (ref 8.5–10.1)
CALCIUM SERPL-MCNC: 8.4 MG/DL (ref 8.5–10.1)
CALCIUM SERPL-MCNC: 8.5 MG/DL (ref 8.5–10.1)
CALCIUM SERPL-MCNC: 8.5 MG/DL (ref 8.5–10.1)
CALCIUM SERPL-MCNC: 8.6 MG/DL (ref 8.5–10.1)
CHLORIDE SERPL-SCNC: 109 MMOL/L (ref 94–109)
CHLORIDE SERPL-SCNC: 109 MMOL/L (ref 94–109)
CHLORIDE SERPL-SCNC: 111 MMOL/L (ref 94–109)
CHLORIDE SERPL-SCNC: 112 MMOL/L (ref 94–109)
CO2 SERPL-SCNC: 18 MMOL/L (ref 20–32)
CO2 SERPL-SCNC: 19 MMOL/L (ref 20–32)
CO2 SERPL-SCNC: 20 MMOL/L (ref 20–32)
CO2 SERPL-SCNC: 20 MMOL/L (ref 20–32)
CO2 SERPL-SCNC: 21 MMOL/L (ref 20–32)
CO2 SERPL-SCNC: 22 MMOL/L (ref 20–32)
CREAT SERPL-MCNC: 2.09 MG/DL (ref 0.52–1.04)
CREAT SERPL-MCNC: 2.19 MG/DL (ref 0.52–1.04)
CREAT SERPL-MCNC: 2.21 MG/DL (ref 0.52–1.04)
CREAT SERPL-MCNC: 2.22 MG/DL (ref 0.52–1.04)
CREAT SERPL-MCNC: 2.22 MG/DL (ref 0.52–1.04)
CREAT SERPL-MCNC: 2.3 MG/DL (ref 0.52–1.04)
ERYTHROCYTE [DISTWIDTH] IN BLOOD BY AUTOMATED COUNT: 14.3 % (ref 10–15)
GFR SERPL CREATININE-BSD FRML MDRD: 21 ML/MIN/{1.73_M2}
GFR SERPL CREATININE-BSD FRML MDRD: 22 ML/MIN/{1.73_M2}
GFR SERPL CREATININE-BSD FRML MDRD: 22 ML/MIN/{1.73_M2}
GFR SERPL CREATININE-BSD FRML MDRD: 23 ML/MIN/{1.73_M2}
GLUCOSE BLDC GLUCOMTR-MCNC: 101 MG/DL (ref 70–99)
GLUCOSE BLDC GLUCOMTR-MCNC: 143 MG/DL (ref 70–99)
GLUCOSE BLDC GLUCOMTR-MCNC: 162 MG/DL (ref 70–99)
GLUCOSE BLDC GLUCOMTR-MCNC: 221 MG/DL (ref 70–99)
GLUCOSE BLDC GLUCOMTR-MCNC: 223 MG/DL (ref 70–99)
GLUCOSE BLDC GLUCOMTR-MCNC: 242 MG/DL (ref 70–99)
GLUCOSE BLDC GLUCOMTR-MCNC: 243 MG/DL (ref 70–99)
GLUCOSE BLDC GLUCOMTR-MCNC: 254 MG/DL (ref 70–99)
GLUCOSE SERPL-MCNC: 166 MG/DL (ref 70–99)
GLUCOSE SERPL-MCNC: 170 MG/DL (ref 70–99)
GLUCOSE SERPL-MCNC: 213 MG/DL (ref 70–99)
GLUCOSE SERPL-MCNC: 229 MG/DL (ref 70–99)
GLUCOSE SERPL-MCNC: 241 MG/DL (ref 70–99)
GLUCOSE SERPL-MCNC: 260 MG/DL (ref 70–99)
HCT VFR BLD AUTO: 31.6 % (ref 35–47)
HGB BLD-MCNC: 9.4 G/DL (ref 11.7–15.7)
INTERPRETATION ECG - MUSE: NORMAL
Lab: ABNORMAL
Lab: ABNORMAL
MAGNESIUM SERPL-MCNC: 2.4 MG/DL (ref 1.6–2.3)
MAGNESIUM SERPL-MCNC: 2.4 MG/DL (ref 1.6–2.3)
MAGNESIUM SERPL-MCNC: 2.5 MG/DL (ref 1.6–2.3)
MCH RBC QN AUTO: 29.3 PG (ref 26.5–33)
MCHC RBC AUTO-ENTMCNC: 29.7 G/DL (ref 31.5–36.5)
MCV RBC AUTO: 98 FL (ref 78–100)
PHOSPHATE SERPL-MCNC: 3.6 MG/DL (ref 2.5–4.5)
PHOSPHATE SERPL-MCNC: 3.6 MG/DL (ref 2.5–4.5)
PHOSPHATE SERPL-MCNC: 3.8 MG/DL (ref 2.5–4.5)
PHOSPHATE SERPL-MCNC: 3.8 MG/DL (ref 2.5–4.5)
PHOSPHATE SERPL-MCNC: 3.9 MG/DL (ref 2.5–4.5)
PHOSPHATE SERPL-MCNC: 4.2 MG/DL (ref 2.5–4.5)
PLATELET # BLD AUTO: 104 10E9/L (ref 150–450)
POTASSIUM SERPL-SCNC: 4.5 MMOL/L (ref 3.4–5.3)
POTASSIUM SERPL-SCNC: 4.7 MMOL/L (ref 3.4–5.3)
POTASSIUM SERPL-SCNC: 4.8 MMOL/L (ref 3.4–5.3)
POTASSIUM SERPL-SCNC: 4.9 MMOL/L (ref 3.4–5.3)
POTASSIUM SERPL-SCNC: 4.9 MMOL/L (ref 3.4–5.3)
POTASSIUM SERPL-SCNC: 6 MMOL/L (ref 3.4–5.3)
RBC # BLD AUTO: 3.21 10E12/L (ref 3.8–5.2)
SODIUM SERPL-SCNC: 140 MMOL/L (ref 133–144)
SODIUM SERPL-SCNC: 141 MMOL/L (ref 133–144)
SODIUM SERPL-SCNC: 141 MMOL/L (ref 133–144)
SODIUM SERPL-SCNC: 142 MMOL/L (ref 133–144)
SODIUM SERPL-SCNC: 142 MMOL/L (ref 133–144)
SODIUM SERPL-SCNC: 143 MMOL/L (ref 133–144)
SPECIMEN SOURCE: ABNORMAL
SPECIMEN SOURCE: ABNORMAL
TACROLIMUS BLD-MCNC: 25.3 UG/L (ref 5–15)
TME LAST DOSE: ABNORMAL H
TROPONIN I SERPL-MCNC: <0.015 UG/L (ref 0–0.04)
TROPONIN I SERPL-MCNC: <0.015 UG/L (ref 0–0.04)
VANCOMYCIN SERPL-MCNC: 18.2 MG/L
WBC # BLD AUTO: 10.9 10E9/L (ref 4–11)

## 2019-03-01 PROCEDURE — 71045 X-RAY EXAM CHEST 1 VIEW: CPT

## 2019-03-01 PROCEDURE — 84132 ASSAY OF SERUM POTASSIUM: CPT | Performed by: STUDENT IN AN ORGANIZED HEALTH CARE EDUCATION/TRAINING PROGRAM

## 2019-03-01 PROCEDURE — 83735 ASSAY OF MAGNESIUM: CPT | Performed by: STUDENT IN AN ORGANIZED HEALTH CARE EDUCATION/TRAINING PROGRAM

## 2019-03-01 PROCEDURE — 00000146 ZZHCL STATISTIC GLUCOSE BY METER IP

## 2019-03-01 PROCEDURE — 12000001 ZZH R&B MED SURG/OB UMMC

## 2019-03-01 PROCEDURE — A9270 NON-COVERED ITEM OR SERVICE: HCPCS | Mod: GY | Performed by: FAMILY MEDICINE

## 2019-03-01 PROCEDURE — 80048 BASIC METABOLIC PNL TOTAL CA: CPT | Performed by: STUDENT IN AN ORGANIZED HEALTH CARE EDUCATION/TRAINING PROGRAM

## 2019-03-01 PROCEDURE — 25000132 ZZH RX MED GY IP 250 OP 250 PS 637: Mod: GY | Performed by: STUDENT IN AN ORGANIZED HEALTH CARE EDUCATION/TRAINING PROGRAM

## 2019-03-01 PROCEDURE — 25000128 H RX IP 250 OP 636: Performed by: FAMILY MEDICINE

## 2019-03-01 PROCEDURE — 87040 BLOOD CULTURE FOR BACTERIA: CPT | Performed by: FAMILY MEDICINE

## 2019-03-01 PROCEDURE — 93005 ELECTROCARDIOGRAM TRACING: CPT

## 2019-03-01 PROCEDURE — 25800025 ZZH RX 258: Performed by: STUDENT IN AN ORGANIZED HEALTH CARE EDUCATION/TRAINING PROGRAM

## 2019-03-01 PROCEDURE — 93010 ELECTROCARDIOGRAM REPORT: CPT | Performed by: INTERNAL MEDICINE

## 2019-03-01 PROCEDURE — 25800030 ZZH RX IP 258 OP 636: Performed by: STUDENT IN AN ORGANIZED HEALTH CARE EDUCATION/TRAINING PROGRAM

## 2019-03-01 PROCEDURE — 84484 ASSAY OF TROPONIN QUANT: CPT | Performed by: STUDENT IN AN ORGANIZED HEALTH CARE EDUCATION/TRAINING PROGRAM

## 2019-03-01 PROCEDURE — 80048 BASIC METABOLIC PNL TOTAL CA: CPT | Performed by: FAMILY MEDICINE

## 2019-03-01 PROCEDURE — 25000132 ZZH RX MED GY IP 250 OP 250 PS 637: Mod: GY | Performed by: FAMILY MEDICINE

## 2019-03-01 PROCEDURE — 40000556 ZZH STATISTIC PERIPHERAL IV START W US GUIDANCE

## 2019-03-01 PROCEDURE — 84100 ASSAY OF PHOSPHORUS: CPT | Performed by: STUDENT IN AN ORGANIZED HEALTH CARE EDUCATION/TRAINING PROGRAM

## 2019-03-01 PROCEDURE — 25000128 H RX IP 250 OP 636: Performed by: STUDENT IN AN ORGANIZED HEALTH CARE EDUCATION/TRAINING PROGRAM

## 2019-03-01 PROCEDURE — 80202 ASSAY OF VANCOMYCIN: CPT | Performed by: STUDENT IN AN ORGANIZED HEALTH CARE EDUCATION/TRAINING PROGRAM

## 2019-03-01 PROCEDURE — 85027 COMPLETE CBC AUTOMATED: CPT | Performed by: STUDENT IN AN ORGANIZED HEALTH CARE EDUCATION/TRAINING PROGRAM

## 2019-03-01 PROCEDURE — 36415 COLL VENOUS BLD VENIPUNCTURE: CPT | Performed by: FAMILY MEDICINE

## 2019-03-01 PROCEDURE — 40000275 ZZH STATISTIC RCP TIME EA 10 MIN

## 2019-03-01 PROCEDURE — 25000125 ZZHC RX 250: Performed by: STUDENT IN AN ORGANIZED HEALTH CARE EDUCATION/TRAINING PROGRAM

## 2019-03-01 PROCEDURE — 80197 ASSAY OF TACROLIMUS: CPT | Performed by: STUDENT IN AN ORGANIZED HEALTH CARE EDUCATION/TRAINING PROGRAM

## 2019-03-01 PROCEDURE — 36415 COLL VENOUS BLD VENIPUNCTURE: CPT | Performed by: STUDENT IN AN ORGANIZED HEALTH CARE EDUCATION/TRAINING PROGRAM

## 2019-03-01 PROCEDURE — 84100 ASSAY OF PHOSPHORUS: CPT | Performed by: FAMILY MEDICINE

## 2019-03-01 RX ORDER — PREDNISONE 5 MG/1
5 TABLET ORAL DAILY
Status: DISCONTINUED | OUTPATIENT
Start: 2019-03-02 | End: 2019-03-05 | Stop reason: HOSPADM

## 2019-03-01 RX ORDER — BACLOFEN 10 MG/1
5 TABLET ORAL 2 TIMES DAILY PRN
Status: DISCONTINUED | OUTPATIENT
Start: 2019-03-01 | End: 2019-03-05 | Stop reason: HOSPADM

## 2019-03-01 RX ORDER — DEXTROSE MONOHYDRATE 25 G/50ML
25 INJECTION, SOLUTION INTRAVENOUS ONCE
Status: COMPLETED | OUTPATIENT
Start: 2019-03-01 | End: 2019-03-01

## 2019-03-01 RX ORDER — DEXTROSE MONOHYDRATE 25 G/50ML
25-50 INJECTION, SOLUTION INTRAVENOUS
Status: DISCONTINUED | OUTPATIENT
Start: 2019-03-01 | End: 2019-03-05 | Stop reason: HOSPADM

## 2019-03-01 RX ORDER — NICOTINE POLACRILEX 4 MG
15-30 LOZENGE BUCCAL
Status: DISCONTINUED | OUTPATIENT
Start: 2019-03-01 | End: 2019-03-05 | Stop reason: HOSPADM

## 2019-03-01 RX ORDER — FUROSEMIDE 10 MG/ML
40 INJECTION INTRAMUSCULAR; INTRAVENOUS ONCE
Status: COMPLETED | OUTPATIENT
Start: 2019-03-01 | End: 2019-03-01

## 2019-03-01 RX ORDER — LORATADINE 10 MG/1
10 TABLET ORAL DAILY
Status: DISCONTINUED | OUTPATIENT
Start: 2019-03-01 | End: 2019-03-05 | Stop reason: HOSPADM

## 2019-03-01 RX ORDER — LEVOTHYROXINE SODIUM 25 UG/1
50 TABLET ORAL DAILY
Status: DISCONTINUED | OUTPATIENT
Start: 2019-03-01 | End: 2019-03-05 | Stop reason: HOSPADM

## 2019-03-01 RX ORDER — DEXTROSE MONOHYDRATE 100 MG/ML
INJECTION, SOLUTION INTRAVENOUS CONTINUOUS
Status: DISPENSED | OUTPATIENT
Start: 2019-03-01 | End: 2019-03-01

## 2019-03-01 RX ORDER — CEFTRIAXONE 2 G/1
2 INJECTION, POWDER, FOR SOLUTION INTRAMUSCULAR; INTRAVENOUS EVERY 24 HOURS
Status: DISCONTINUED | OUTPATIENT
Start: 2019-03-01 | End: 2019-03-05 | Stop reason: HOSPADM

## 2019-03-01 RX ADMIN — DEXTROSE MONOHYDRATE: 100 INJECTION, SOLUTION INTRAVENOUS at 04:06

## 2019-03-01 RX ADMIN — LEVOTHYROXINE SODIUM 50 MCG: 25 TABLET ORAL at 16:39

## 2019-03-01 RX ADMIN — SODIUM BICARBONATE: 84 INJECTION, SOLUTION INTRAVENOUS at 05:41

## 2019-03-01 RX ADMIN — LEVETIRACETAM 500 MG: 5 INJECTION INTRAVENOUS at 03:49

## 2019-03-01 RX ADMIN — HEPARIN SODIUM 5000 UNITS: 5000 INJECTION, SOLUTION INTRAVENOUS; SUBCUTANEOUS at 23:54

## 2019-03-01 RX ADMIN — PIPERACILLIN AND TAZOBACTAM 3.38 G: 3; .375 INJECTION, POWDER, FOR SOLUTION INTRAVENOUS at 14:35

## 2019-03-01 RX ADMIN — HEPARIN SODIUM 5000 UNITS: 5000 INJECTION, SOLUTION INTRAVENOUS; SUBCUTANEOUS at 12:49

## 2019-03-01 RX ADMIN — SODIUM CHLORIDE: 9 INJECTION, SOLUTION INTRAVENOUS at 11:12

## 2019-03-01 RX ADMIN — FUROSEMIDE 40 MG: 10 INJECTION, SOLUTION INTRAVENOUS at 03:49

## 2019-03-01 RX ADMIN — SODIUM CHLORIDE: 9 INJECTION, SOLUTION INTRAVENOUS at 20:47

## 2019-03-01 RX ADMIN — SODIUM CHLORIDE: 9 INJECTION, SOLUTION INTRAVENOUS at 21:22

## 2019-03-01 RX ADMIN — METHYLPREDNISOLONE SODIUM SUCCINATE 4 MG: 40 INJECTION, POWDER, LYOPHILIZED, FOR SOLUTION INTRAMUSCULAR; INTRAVENOUS at 08:56

## 2019-03-01 RX ADMIN — HUMAN INSULIN 10 UNITS: 100 INJECTION, SOLUTION SUBCUTANEOUS at 03:48

## 2019-03-01 RX ADMIN — LEVETIRACETAM 500 MG: 5 INJECTION INTRAVENOUS at 16:27

## 2019-03-01 RX ADMIN — INSULIN GLARGINE 12 UNITS: 100 INJECTION, SOLUTION SUBCUTANEOUS at 21:51

## 2019-03-01 RX ADMIN — CEFTRIAXONE SODIUM 2 G: 2 INJECTION, POWDER, FOR SOLUTION INTRAMUSCULAR; INTRAVENOUS at 20:49

## 2019-03-01 RX ADMIN — HYDRALAZINE HYDROCHLORIDE 10 MG: 20 INJECTION INTRAMUSCULAR; INTRAVENOUS at 00:47

## 2019-03-01 RX ADMIN — DEXTROSE MONOHYDRATE 25 G: 500 INJECTION PARENTERAL at 03:49

## 2019-03-01 RX ADMIN — PIPERACILLIN AND TAZOBACTAM 3.38 G: 3; .375 INJECTION, POWDER, FOR SOLUTION INTRAVENOUS at 08:56

## 2019-03-01 RX ADMIN — HEPARIN SODIUM 5000 UNITS: 5000 INJECTION, SOLUTION INTRAVENOUS; SUBCUTANEOUS at 01:54

## 2019-03-01 RX ADMIN — PIPERACILLIN AND TAZOBACTAM 3.38 G: 3; .375 INJECTION, POWDER, FOR SOLUTION INTRAVENOUS at 02:32

## 2019-03-01 ASSESSMENT — ACTIVITIES OF DAILY LIVING (ADL)
ADLS_ACUITY_SCORE: 34
ADLS_ACUITY_SCORE: 33

## 2019-03-01 ASSESSMENT — MIFFLIN-ST. JEOR: SCORE: 1699.38

## 2019-03-01 NOTE — PROGRESS NOTES
Nephrology Brief Note    Received call from primary team about accidental administration of 250mL of tacrolimus IV over 1 hour rather than 2.8mL/hr as gtt. At concentration 20mcg/mL this is 5000mcg or 5mg IV. 5mg tacro IV is equivalent to 15mg tacro PO. Given this substantial bolused dose would recommend no further tacrolimus tonight (discontinue gtt) and check trough level in AM (already ordered).     Tacro may cause neuro changes, including seizure and PRESS. Monitor neuro status.     Tacro can cause type 4 RTA and also cause renal failure via vasoconstrictive properties. Continue to trend currently improving hyperkalemia. Q4h labs suggested by primary  team is reasonable.     Tacro can cause QT prolongation. Check EKG now, check magnesium level and replete to 2 mg/dL, and monitor with telemetry. Avoid QT prolonging medications at this time. Pt currently on Vanc/Zosyn for Abx and Keppra, not significant QT prolonging drugs.     Tacrolimus is not dialyzable. Thus supportive cares is recommended.     Discussed with Dr Parra.     Shabbir Begum  Renal Fellow   0225

## 2019-03-01 NOTE — PHARMACY-CONSULT NOTE
Pharmacy Consult - Review Medications for Cause of QTc Prolongation    Tacrolimus is the only medication that the patient has received in the past 24 hours that carries a risk of QTc prolongation. It is very possible that tacrolimus is contributing to the new prolongation given the patient received an entire bag of IV tacrolimus as a bolus overnight.    Guero Neumann, VivianD

## 2019-03-01 NOTE — PHARMACY-CONSULT NOTE
Patient is being treated for hyperkalemia. A pharmacy consult was initiated to review the patient's medication list for possible causes of hyperkalemia.  No medications on this patient's profile are likely to have the side effect of hyperkalemia.     Continue current medication regimen.

## 2019-03-01 NOTE — PHARMACY-VANCOMYCIN DOSING SERVICE
Pharmacy Vancomycin Note  Date of Service 2019  Patient's  1945    73 year old, female    Indication: Sepsis  Goal Trough Level: 15-20 mg/L  Day of Therapy: 2  Current Vancomycin regimen:  Intermittent dosing  Current estimated CrCl = Estimated Creatinine Clearance: 29.6 mL/min (A) (based on SCr of 2.3 mg/dL (H)).    Creatinine for last 3 days  2019: 12:35 AM Creatinine 2.27 mg/dL;  3:36 AM Creatinine 2.20 mg/dL;  6:12 AM Creatinine 2.26 mg/dL;  4:06 PM Creatinine 2.13 mg/dL; 10:38 PM Creatinine 2.09 mg/dL  3/1/2019: 12:43 AM Creatinine 2.09 mg/dL;  4:26 AM Creatinine 2.19 mg/dL;  5:45 AM Creatinine 2.21 mg/dL; 11:19 AM Creatinine 2.30 mg/dL    Recent Vancomycin Levels (past 3 days)  3/1/2019:  5:44 AM Vancomycin Level 18.2 mg/L    Vancomycin IV Administrations (past 72 hours)                   vancomycin (VANCOCIN) 2,250 mg in sodium chloride 0.9 % 500 mL intermittent infusion (mg) 2,250 mg New Bag 19 0353                Nephrotoxins and other renal medications (From now, onward)    Start     Dose/Rate Route Frequency Ordered Stop    19 1415  vancomycin (VANCOCIN) 2,000 mg in sodium chloride 0.9 % 500 mL intermittent infusion      2,000 mg  over 2 Hours Intravenous ONCE 19 1414      19 0800  piperacillin-tazobactam (ZOSYN) 3.375 g vial to attach to  mL bag      3.375 g  over 30 Minutes Intravenous EVERY 6 HOURS 19 0440      19 0248  vancomycin place [facility] - receiving intermittent dosing      1 each Does not apply SEE ADMIN INSTRUCTIONS 19 0248               Contrast Orders - past 72 hours (72h ago, onward)    None          Interpretation of levels and current regimen:  Trough level is  Therapeutic (26 hr level = 18.2)    Has serum creatinine changed > 50% in last 72 hours: No    Urine output:  good urine output    Renal Function: Stable    Plan:  1.  Patient is due for another dose. Will give 2000 mg IV once and continue on intermittent dosing.  Will re-dose when level <20  2.  Pharmacy will check trough levels as appropriate in 1-3 Days.    3. Serum creatinine levels will be ordered daily for the first week of therapy and at least twice weekly for subsequent weeks.      Guero Neumann, PharmD        .

## 2019-03-01 NOTE — CONSULTS
[facility]  Transplant Infectious Disease Consult Note - New Patient     Patient:  Gem Jade, Date of birth 1945, Medical record number [id number]  Date of Visit:  03/01/2019  Consult requested by Gregg German MD for evaluation of E.Coli bacteremia in kidney transplant patient, need for prophylaxis for recurrent UTIs.       Assessment and Recommendations:   Recommendations:  1. Discontinue Vancomycin  2. Ok to Transition Zosyn to Ceftriaxone given Verigene results. Likely will be able to switch to PO Cipro on discharge given excellent oral bioavailability (equal to IV), but will de-escalate pending further susceptibility results.  3. Will treat for a total of two weeks.   4. Follow-up in ID clinic to discuss possible UTI prophylaxis. Will not initiate this admission.    Thank you very much for this consultation. Transplant Infectious Disease will continue to follow with you.    Assessment:    1. E. Coli Bacteremia 2/2 Urosepsis  1/2 blood cultures on admission positive for E. Coli, likely urinary source given urine culture positive for E.Coli. History of E.Coli bacteremia 2/2 urosepsis in 5/2018. Patient's previous cultures pan-susceptible. Given speciation, would recommend discontinuing Vancomycin and de-escalating Zosyn to Ceftriaxone. Will treat for two-week course with subsequent follow-up with ID outpatient. Can likely discharge on oral Ciprofloxacin given IV-PO formulations have equal, excellent bioavailability (has allergy to Levofloxacin, but has tolerated Ciprofloxacin in the past).     2. Recurrent UTIs  Patient has extensive history of positive urine cultures (E.Coli, Candida, Citrobacter) over the past year, with admissions in 9/2018, 7/2018, and 5/2018 with concerns for UTIs. Guidelines on recurrent UTI prophylaxis can be nebulous. Typically, 3-4 UTIs in a one-year period can warrant discussion on UTI prophylaxis. However, it can be difficult in chronic  rico patients to determine true UTIs from BPCUS or ABPUS. This patient has relatively long lengths of time between admissions for UTIs, and given the pan-susceptibility of the organisms she grows, we do not think at this time she warrants prophylaxis as this would contribute to future antibiotic resistance. However, should her admissions or her symptoms be particularly trying or frustrating for the patient, would discuss prophylaxis. Given the patient is currently altered, may be best to discuss as outpatient after this acute illness.    Infectious Disease issues include:  - QTc interval: 502 on 3/1/2019  - Bacterial prophylaxis: None  - Pneumocystis prophylaxis: None  - Viral serostatus & prophylaxis: HSV 1 and 2 (-), CMV (-), EBV (+)  - Fungal prophylaxis: None  - Gamma globulin status: IgG 1,530  (4/25/2017)  - Isolation status: Good hand hygiene.    Mago Payne MD     Internal Medicine PGY1  [facility]  Pager [phone]         History of Infectious Disease Illness:     Ms. Jade is a 73 year-old female with PMH kidney transplant 1999, T2DM, and recurrent UTIs 2/2 indwelling rcio catheter who presented to ED on 2/28 for altered mental status concerning for UTI. ID consulted for E.Coli bacteremia treatment as well as question on recurrent UTI prophylaxis.     Patient has history of UTIs. 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 Most recently, she was admitted in September 2018 for AMS and urosepsis, at that time UCx were positive for E.Coli and BCx were negative. She was initiated on Cefepime, de-escalated to Ceftriaxone, and ultimately discharged on a 14-day course of Cefpodoxime.     Patient was altered when evaluated today, and history obtained from EMR and assistance from nursing. Per EMR, patient started developing confusion on 2/27. Home RN came to home and performed UA and  was awaiting results  but decided to bring patient to ED given increasing confusion. She was given 2L IVF and started on Vanc/Zosyn. She was found to have elevated potassium at 6.4, ROB with creatinine at 2.26 (baseline 1.6), and leukocytosis of 11.4. Her UA showed large LE, blood, with 139 WBC and 159 RBCs. BCx were taken on admission and 1/2 bottles turned positive at ~15 hours for E.Coli on Verigene.     Overnight, Tacrolimus gtt was initiated at intended rate of 2.8mL/hr, but due to pump dysfunction, she received total 250mL over one hour, equating to over 7.5x her daily dose of Tacrolimus. Nephro was emergently contacted and supportive plan of care in place. Did develop some delirium overnight, and appears emotionally labile today. Today, creatinine is slowly rising, K is stable, and patient remains on bicarb gtt.     Transplants:  10/27/1999 (Kidney), Postoperative day:  7065.  Coordinator Eunice Veronica    Review of Systems:  Unable to assess given patient's altered mental status.    Past Medical History:   Diagnosis Date     Background diabetic retinopathy(362.01)     extensive diabetic retinopathy, s/p multiple laser treatments     Diabetic gastroparesis (H)     followed by [facility] (Dr. 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)      Urinary tract infection, site not specified     Chronic UTIs     UTI (urinary tract infection) due to urinary indwelling catheter (H)        Past Surgical History:   Procedure Laterality Date     C NONSPECIFIC PROCEDURE  10/99     kidney transplant     C NONSPECIFIC PROCEDURE      MRI head, lumbar spine, pelvis     C TOTAL KNEE ARTHROPLASTY  3/03    Knee Replacement, Total right, post op PE     CHOLECYSTECTOMY       HC LEFT HEART CATHETERIZATION      Cardiac Cath, Left Heart, Negative  (@ [facility])     HC LEFT HEART CATHETERIZATION      normal coronary angiogram at [facility], had mild troponin rise (0.71)     HC REMV CATARACT EXTRACAP,INSERT LENS      bilateral cataract repaired, left eye        Family History   Problem Relation Age of Onset     Diabetes Mother      Parkinsonism Mother      Diabetes Brother      Hypertension Father         [id number] pneumonia     Heart Disease Father        Social History     Social History Narrative    , lives with , she is bed bound (non-ambulatory) for last several years.  She has had a rico for > 10 years.  She is a full code as discussed with .  (last updated 2018)      Social History     Tobacco Use     Smoking status: Former Smoker     Years: 17.00     Last attempt to quit: 1987     Years since quittin.7     Smokeless tobacco: Never Used   Substance Use Topics     Alcohol use: No     Drug use: No       Immunization History   Administered Date(s) Administered     HepB 2003     Influenza (H1N1) 2009     Influenza (High Dose) 3 valent vaccine 2015, 10/07/2016     Influenza (IIV3) PF 2000, 2002, 2003, 10/22/2004, 2005, 2006, 2007, 10/21/2008, 10/06/2009, 2010, 2011     Influenza Vaccine IM 3yrs+ 4 Valent IIV4 10/16/2013     Pneumo Conj 13-V (2010&after) 2015     Pneumococcal 23 valent 2013     TDAP Vaccine (Adacel) 2013     Zoster vaccine, live 2013       Patient Active Problem List   Diagnosis     Kidney replaced by transplant     Primary localized osteoarthrosis, lower leg     Pulmonary embolism and infarction (H)     Diabetes mellitus with background retinopathy (H)      HYPERLIPIDEMIA LDL GOAL <100     Diabetic gastroparesis (H)     Back pain     Compression fx, thoracic spine (H)     Hyperkalemia     Anemia     Clostridium difficile diarrhea     Hydronephrosis     Recurrent UTI     Benign essential hypertension     Hyponatremia     Vancomycin resistant Enterococcus     Clostridium difficile colitis     Diabetic polyneuropathy associated with type 2 diabetes mellitus (H)     Chronic shoulder pain, unspecified laterality     Morbid obesity, unspecified obesity type (H)     Health Care Home     Paroxysmal A-fib (H)     Acute kidney injury (H)     Pancytopenia, acquired (H)     Obstructive sleep apnea syndrome     Altered Mental Status, Probable Metabolic encephalopathy     Chronic indwelling Johnson catheter     Seizure disorder (H)     Dermatitis     Encephalopathy acute     Type 2 diabetes mellitus with diabetic polyneuropathy, with long-term current use of insulin (H)     Sepsis (H)            Current Medications & Allergies:       calcium gluconate  2 g Intravenous Once     heparin  5,000 Units Subcutaneous Q12H     insulin aspart  1-12 Units Subcutaneous Q4H     insulin glargine  12 Units Subcutaneous At Bedtime     levETIRAcetam  500 mg Intravenous Q12H     methylPREDNISolone  4 mg Intravenous Daily     naloxone  0.4 mg Intravenous Once     piperacillin-tazobactam  3.375 g Intravenous Q6H     vancomycin place smith - receiving intermittent dosing  1 each Does not apply See Admin Instructions       Infusions/Drips:    sodium bicabonate in 5% dextrose for infusion 50 mL/hr at 03/01/19 0541     sodium chloride Stopped (02/28/19 1255)       Allergies   Allergen Reactions     Bactrim Ds [Sulfamethoxazole W/Trimethoprim]      Creatinine level increased     Atorvastatin Calcium      myalgias, muscle aches     Codeine Nausea and Vomiting     Darvocet [Propoxyphene N-Apap] Nausea and Vomiting     Hydromorphone      Levofloxacin Other (See Comments) and Diarrhea     hallucinations      Morphine      Nausea and vomiting     Niaspan [Niacin] GI Disturbance     Flushing even at low dose, GI upset     Percocet [Oxycodone-Acetaminophen]      Vomiting after taking med couple of times     Vicodin [Hydrocodone-Acetaminophen] Nausea and Vomiting            Physical Exam:   Vitals were reviewed.  All vitals stable.  Patient Vitals for the past 24 hrs:   BP Temp Temp src Pulse Resp SpO2 Weight   03/01/19 0900 137/51 97  F (36.1  C) -- 87 8 -- --   03/01/19 0800 146/56 96.8  F (36  C) -- 84 11 -- --   03/01/19 0700 153/60 96.8  F (36  C) -- 84 12 -- --   03/01/19 0600 -- 97  F (36.1  C) -- -- 14 100 % --   03/01/19 0500 158/72 97  F (36.1  C) -- 80 10 100 % --   03/01/19 0400 148/55 97  F (36.1  C) Bladder -- 12 100 % 112.5 kg (248 lb 0.3 oz)   03/01/19 0300 145/60 97  F (36.1  C) -- 88 13 98 % --   03/01/19 0200 147/58 97  F (36.1  C) -- 80 12 100 % --   03/01/19 0100 147/45 97.3  F (36.3  C) -- 80 11 100 % --   03/01/19 0000 179/70 97.3  F (36.3  C) Bladder 74 10 100 % --   02/28/19 2300 115/45 97.5  F (36.4  C) -- 67 20 96 % --   02/28/19 2200 109/41 97.3  F (36.3  C) -- 69 11 99 % --   02/28/19 2100 -- 98.1  F (36.7  C) -- -- 11 96 % --   02/28/19 2000 -- 98.2  F (36.8  C) Bladder -- 11 100 % --   02/28/19 1900 -- 97.7  F (36.5  C) -- -- 12 100 % --   02/28/19 1800 -- 97.7  F (36.5  C) -- -- 10 100 % --   02/28/19 1700 -- 98.1  F (36.7  C) -- -- 11 100 % --   02/28/19 1600 -- 98.2  F (36.8  C) Bladder -- 8 100 % --   02/28/19 1500 -- 98.8  F (37.1  C) -- -- 15 100 % --   02/28/19 1400 -- 99.5  F (37.5  C) -- -- 18 100 % --   02/28/19 1300 -- 100  F (37.8  C) -- -- 13 100 % --   02/28/19 1200 136/62 100.2  F (37.9  C) Bladder -- 17 100 % --   02/28/19 1100 -- 100  F (37.8  C) -- -- 12 100 % --   02/28/19 1020 -- 100  F (37.8  C) -- -- 10 97 % --   02/28/19 1000 -- 100  F (37.8  C) -- -- 11 100 % --     Ranges for vital signs:  Temp:  [96.8  F (36  C)-100.2  F (37.9  C)] 97  F (36.1  C)  Pulse:  [67-88]  87  Heart Rate:  [63-87] 87  Resp:  [8-20] 8  BP: (109-179)/(41-72) 137/51  SpO2:  [96 %-100 %] 100 %  Vitals:    02/28/19 0200 03/01/19 0400   Weight: 108.9 kg (240 lb 1.6 oz) 112.5 kg (248 lb 0.3 oz)       Physical Examination:  GENERAL:  Obese female, lying in bed, sad affect, emotionally labile  HEAD:  Head is normocephalic, atraumatic   EYES:  Eyes have anicteric sclerae without conjunctival injection   ENT:  Oropharynx is moist without exudates or ulcers. Tongue is midline  NECK:  Supple. No cervical lymphadenopathy  LUNGS:  Clear to auscultation bilateral.   CARDIOVASCULAR:  Regular rate and rhythm with no murmurs, gallops or rubs.  ABDOMEN:  Normal bowel sounds, soft, nontender. No appreciable hepatosplenomegaly.  GENITOURINARY: Johnson catheter in place, draining light yellow urine  SKIN:  No acute rashes.  Line in place without any surrounding erythema or exudate.  NEUROLOGIC:  Grossly nonfocal. Active x4 extremities         Laboratory Data:     Absolute CD4   Date Value Ref Range Status   02/17/2017 379 (L) 441 - 2,156 cells/uL Final       Inflammatory Markers    Recent Labs   Lab Test 03/08/17  1243 08/31/16  0801 05/25/16  2116 02/18/16  1436 04/28/15  1650 12/29/14  1330   SED  --   --  53*  --   --   --    CRP 7.5 39.0* <2.9 8.8* 145.0* 114.0*       Immune Globulin Studies     Recent Labs   Lab Test 04/25/17  1436   IGG 1,530             Metabolic Studies       Recent Labs   Lab Test 03/01/19  0545 03/01/19  0426  02/28/19  2238  02/28/19  0612 02/28/19  0336  02/28/19  0035  03/09/17  1310  08/29/16  2157  08/07/12  0647    141   < > 139   < > 139 137   < > 135   < >  --    < > 133   < > 135   POTASSIUM 4.9 4.9   < > 5.6*   < > 5.5* 6.0*   < > 6.3*  6.4*   < >  --    < > 4.5   < > 5.9*   CHLORIDE 112* 109   < > 112*   < > 112* 110*  --  108   < >  --    < > 94   < > 104   CO2 19* 18*   < > 20   < > 20 18*  --  23   < >  --    < > 32   < > 22   ANIONGAP 11 14   < > 7   < > 7 8  --   5   < >  --    < > 7   < > 8   BUN 56* 58*   < > 58*   < > 62* 62*  --  70*   < >  --    < > 47*   < > 21   CR 2.21* 2.19*   < > 2.09*   < > 2.26* 2.20*  --  2.27*   < >  --    < > 1.70*   < > 1.49*   GFRESTIMATED 22* 22*   < > 23*   < > 21* 22*  --  21*   < >  --    < > 30*   < > 35*   * 260*   < > 210*   < > 115* 99   < > 90   < >  --    < > 199*   < > 79   A1C  --   --   --   --   --  7.3*  --   --   --    < >  --    < >  --    < >  --    ROGER 8.3* 8.6   < > 8.4*   < > 8.4* 8.6  --  8.6   < >  --    < > 10.6*   < > 8.6   PHOS 3.8 3.9   < > 4.0  --  3.7  --   --   --    < >  --    < >  --    < >  --    MAG  --   --   --  2.5*  --  2.6*  --   --   --    < >  --    < >  --    < >  --    LACT  --   --   --   --   --   --  0.6*  --  1.4   < >  --    < >  --    < >  --    PCAL  --   --   --   --   --  0.54  --   --   --    < >  --    < >  --    < >  --    FGTL  --   --   --   --   --   --   --   --   --   --   --   --  459  --   --    CKT  --   --   --   --   --   --   --   --   --   --  36  --   --   --  <20*    < > = values in this interval not displayed.       Hepatic Studies    Recent Labs   Lab Test 02/28/19  0035 09/17/18  1251 09/16/18  0751 09/15/18  2347  04/26/17  0638  09/03/16  1329 08/30/16  1709  09/03/13  0725   BILITOTAL 0.4 0.2  --  0.3   < > 0.6   < >  --  0.5   < > 0.3   BILIDELTA  --   --   --   --   --   --   --   --   --   --  0.3   BILICONJ  --   --   --   --   --   --   --   --   --   --  0.0   DBIL  --   --   --   --   --  <0.1   < >  --   --    < >  --    ALKPHOS 103 54  --  71   < > 72   < >  --  69   < > 77   PROTTOTAL 7.8 7.0  --  8.3   < > 7.3   < >  --  7.1   < > 5.9*   ALBUMIN 3.2* 2.6* 2.8* 3.0*   < > 3.0*   < >  --  3.0*   < > 2.6*   AST 31 6  --  5   < > 20   < >  --  8   < > 17   ALT 31 9  --  10   < > 23   < >  --  8   < > 27   LDH  --   --   --   --   --   --   --  150 157  --   --     < > = values in this interval not displayed.       Pancreatitis testing    Recent Labs    Lab Test 11/06/17 1652 04/19/17  0005 12/05/16  1210  09/02/13  0053 06/29/13  1711   LIPASE 99 190  --   --  92 125   TRIG  --   --  272*   < >  --   --     < > = values in this interval not displayed.       Gout Labs      Recent Labs   Lab Test 05/29/18 0440 02/17/17  2358   URIC 7.3* 7.5*       Hematology Studies      Recent Labs   Lab Test 03/01/19 0545  02/28/19  0612  02/28/19  0035 02/27/19  1900 02/04/19  1252 01/24/19 2005   WBC 10.9  --  12.8*  --  11.4* 9.9 6.0 6.3   ANEU  --   --   --   --  10.5*  --  4.0 3.8   ALYM  --   --   --   --  0.4*  --  1.4 1.4   CARROLL  --   --   --   --  0.5  --  0.4 0.7   AEOS  --   --   --   --  0.0  --  0.2 0.3   HGB 9.4*  --  9.8*   < > 10.7* 10.0* 10.8* 10.1*   HCT 31.6*  --  33.0*  --  36.5 32.4* 34.4* 32.0*   *   < > 109*  --  101* 113* 161 129*    < > = values in this interval not displayed.       Clotting Studies    Recent Labs   Lab Test 02/28/19 0035 11/06/17 1652 04/26/17  2338 04/26/17  0638   INR 1.09 1.09 1.03 1.06   PTT  --  35  --   --        Iron Testing    Recent Labs   Lab Test 03/01/19 0545  05/29/18 0440  03/09/17  1310  02/17/17  2358  09/04/16  0815 09/03/16  1329  08/29/16  2316  08/07/16  0750   IRON  --   --   --   --   --   --  50  --  124  --   --   --   --  12*   FEB  --   --   --   --   --   --  170*  --  128*  --   --   --   --  166*   IRONSAT  --   --   --   --   --   --  29  --  97*  --   --   --   --  7*   GIANA  --   --   --   --   --   --  1,069*  --  2,676*  --   --   --   --  645*   MCV 98   < > 92   < >  --    < >  --    < > 94  --    < >  --    < > 106*   FOLIC  --   --   --   --  5.2*  --  8.5  --   --   --    < >  --   --  10.4   B12  --   --  1,383*  --  1,402*  --  1,031*  --   --   --    < >  --   --  131*   HAPT  --   --   --   --   --   --   --   --   --  94   < >  --   --   --    RETP  --   --   --   --   --   --   --   --   --   --   --  0.3*  --   --    RETICABSCT  --   --   --   --   --   --   --   --   --   --    --  7.4*  --   --     < > = values in this interval not displayed.       Markers  No lab results found.    Invalid input(s): FETOPROTEIN, SERUM, AFP    Autoimmune Testing  No lab results found.    Invalid input(s): MPO, PRO3, C3, C4, VASPAN    Arterial Blood Gas Testing    Recent Labs   Lab Test 02/28/19  0612 02/28/19  0336 05/28/18  1756 05/25/18  0817 01/21/18  0955  01/19/18  0543 01/18/18  1545   PH  --   --  7.35 7.33* 7.40  --  7.50* 7.43   PCO2  --   --  37 39 35  --  33* 39   PO2  --   --  145* 145* 112*  --  125* 169*   HCO3  --   --  20* 21 21  --  26 26   O2PER 96.0 95.0 3 LPM 30% 30%   < >  --  Ventilator    < > = values in this interval not displayed.        Thyroid Studies     Recent Labs   Lab Test 05/29/18  0440 05/25/18  0900 01/19/18  0445 12/19/17  1602 11/13/17  1613 03/09/17  1310  05/26/16  0744   TSH 0.85 0.94 1.31 1.34 0.77 1.20   < > 0.96   T4  --   --   --   --   --  0.72*  --  1.00    < > = values in this interval not displayed.       Urine Studies     Recent Labs   Lab Test 02/28/19  2200 02/28/19  0215 02/27/19  1900 01/24/19 2010 01/04/19  1300   URINEPH 5.0 5.5 6.0 5.5 6.0   NITRITE Negative Positive* Negative Negative Negative   LEUKEST Large* Large* Large* Large* Large*   WBCU >182* 1,760* 139* >182* >182*       Medication levels    Recent Labs   Lab Test 03/01/19  0544 02/04/19  1252  08/21/17  1210  07/16/13  1220   VANCOMYCIN 18.2  --    < >  --    < >  --    TACROL  --  5.2   < > <3.0*   < > 3.6*   RAPAMY  --   --   --  <2.0*   < >  --    MPACID  --   --   --   --   --  3.13   MPAG  --   --   --   --   --  98.3*    < > = values in this interval not displayed.       CSF testing     Recent Labs   Lab Test 05/30/18  0845 09/02/16  0940   CWBC Canceled, Test credited  0 0  Duplicate request  Test not indicated.     CRBC Canceled, Test credited  3* 2  Duplicate request  Test not indicated.     CGLU 100* 89*   CTP 48 44       Body fluid stats    Recent Labs   Lab Test  05/30/18  0845   GS No organisms seen  No WBC's seen  Preliminary Gram stain report called to and read back by  KEVON WEST ON ICU @ 1043 DK    Gram stain review consistent with reported results.  Gram stain slide reviewed at the [facility]         Microbiology:  Fungal testing  Recent Labs   Lab Test 08/29/16  2157   FGTL 459       Last Culture results with specimen source  Culture Micro   Date Value Ref Range Status   03/01/2019 PENDING  Preliminary   03/01/2019 PENDING  Preliminary   02/28/2019 (A)  Preliminary    50,000 to 100,000 colonies/mL  Escherichia coli  Susceptibility testing in progress     02/28/2019 (A)  Preliminary    Cultured on the 1st day of incubation:  Escherichia coli     02/28/2019   Preliminary    Critical Value/Significant Value, preliminary result only, called to and read back by  Garth Bangura RN on 2.28.19 at 1516. bw     02/28/2019 Susceptibility testing in progress  Preliminary   02/28/2019   Preliminary    (Note)  POSITIVE for E.COLI by Verigene multiplex nucleic acid test. Final  identification and antimicrobial susceptibility testing will be  verified by standard methods. Verigene test will not distinguish  E.coli from Shigella species including S.dysenteriae, S.flexneri,  S.boydii, and S.sonnei. Specimens containing Shigella species or  E.coli will be reported as Positive for E.coli.    Specimen tested with Verigene multiplex, gram-negative blood culture  nucleic acid test for the following targets: Acinetobacter sp.,  Citrobacter sp., Enterobacter sp., Proteus sp., E. coli, K.  pneumoniae/oxytoca, P. aeruginosa, and the following resistance  markers: CTXM, KPC, NDM, VIM, IMP and OXA.    Critical Value/Significant Value called to and read back by GARTH BANGURA RN 1729 2.28.19 NDP     02/28/2019 No growth after 1 day  Preliminary   02/27/2019 (A)  Preliminary    >100,000 colonies/mL  Escherichia coli  Susceptibility testing in progress      01/24/2019 50,000 to 100,000 colonies/mL  Escherichia coli   (A)  Final   01/04/2019 <10,000 colonies/mL  Escherichia coli   (A)  Final   01/04/2019 (A)  Final    >100,000 colonies/mL  Candida albicans / dubliniensis  Candida albicans and Candida dubliniensis are not routinely speciated  Susceptibility testing not routinely done     12/26/2018 >100,000 colonies/mL  Escherichia coli   (A)  Final   11/02/2018 (A)  Final    >100,000 colonies/mL  Candida albicans / dubliniensis  Candida albicans and Candida dubliniensis are not routinely speciated  Susceptibility testing not routinely done     10/17/2018 >100,000 colonies/mL  Escherichia coli   (A)  Final   09/17/2018 No growth  Final   09/17/2018 No growth  Final   09/16/2018 >100,000 colonies/mL  Escherichia coli   (A)  Final   09/15/2018 (A)  Final    Cultured on the 1st day of incubation:  Staphylococcus hominis  This isolate DOES NOT demonstrate inducible clindamycin resistance in vitro. Clindamycin   is susceptible and could be used when indicated, however, erythromycin is resistant and   should not be used.     09/15/2018   Final    Critical Value/Significant Value, preliminary result only, called to and read back by  Armida Cardenas RN at 1835 on 9/16/18 by MARNI.     09/15/2018   Final    (Note)  POSITIVE for Staphylococci other than S.aureus, S.epidermidis and  S.lugdunensis, by Intellioneigene multiplex nucleic acid test.  Coagulase-negative staphylococci are the most common venipuncture or  collection associated skin CONTAMINANTS grown in blood cultures.  Final identification and antimicrobial susceptibility testing will be  verified by standard methods.    Specimen tested with Verigene multiplex, gram-positive blood culture  nucleic acid test for the following targets: Staph aureus, Staph  epidermidis, Staph lugdunensis, other Staph species, Enterococcus  faecalis, Enterococcus faecium, Streptococcus species, S. agalactiae,  S. anginosus grp., S. pneumoniae,  S. pyogenes, Listeria sp., mecA  (methicillin resistance) and Viridiana/B (vancomycin resistance).    Critical Value/Significant Value called to and read back by Ruba Thompson RN at 2115 on 9/16/18 by MARNI.      Specimen Description   Date Value Ref Range Status   03/01/2019 Blood Right Arm  Final   03/01/2019 Blood Left Hand  Final   02/28/2019 Nares  Final   02/28/2019 Catheterized Urine  Final   02/28/2019 Blood Left Arm  Final   02/28/2019 Blood Right Arm  Final   02/27/2019 Catheterized Urine  Final   01/24/2019 Catheterized Urine  Final   01/04/2019 Catheterized Urine  Final   12/26/2018 Catheterized Urine  Final   11/02/2018 Catheterized Urine  Final   10/17/2018 Catheterized Urine  Final   09/17/2018 Blood Right Hand  Final   09/17/2018 Blood Left Hand  Final   09/16/2018 Catheterized Urine  Final   09/15/2018 Blood Left Arm  Final   09/15/2018 Blood Right Ankle  Final   09/13/2018 Catheterized Urine  Final   07/28/2018 Catheterized Urine  Final        Last check of C difficile  C Diff Toxin B PCR   Date Value Ref Range Status   01/23/2018 Negative NEG^Negative Final     Comment:     Negative: Clostridium difficile target DNA sequences NOT detected, presumed   negative for Clostridium difficile toxin B or the number of bacteria present   may be below the limit of detection for the test.  FDA approved assay performed using [employer] GeneXpert real-time PCR.  A negative result does not exclude actual disease due to Clostridium difficile   and may be due to improper collection, handling and storage of the specimen   or the number of organisms in the specimen is below the detection limit of the   assay.         Syphilis Testing    Treponema pallidum Antibody   Date Value Ref Range Status   03/10/2017 Negative NEG Final       Quantiferon testing   Recent Labs   Lab Test 09/02/16  0940   AFBSMS Canceled, Test credited  Acid Fast Stain was not performed as per [facility] User's Guide. 2.0 mL of specimen   received. A minimum  of 5 ml of cerebrospinal fluid is recommended for recovery   of acid fast bacilli (AFB) from CSF.  Volumes less than 5 ml are suboptimal and   may compromise recovery of AFB.  Assayed at [facility] [address]         Virology:  CMV viral loads    Recent Labs   Lab Test 04/20/17 2021 03/09/17  1310 09/02/16  0838 08/31/16  0801 05/28/16  0743   CSPEC Plasma  CORRECTED ON 04/21 AT 0814: PREVIOUSLY REPORTED AS Whole blood, EDTA   anticoagulant   Plasma, EDTA anticoagulant EDTA PLASMA Plasma  CORRECTED ON 09/01 AT 1330: PREVIOUSLY REPORTED AS Blood   Plasma, EDTA anticoagulant   CMVLOG Not Calculated Not Calculated <2.1 Not Calculated Not Calculated       Log IU/mL of CMVQNT   Date Value Ref Range Status   04/20/2017 Not Calculated <2.1 [Log_IU]/mL Final   03/09/2017 Not Calculated <2.1 [Log_IU]/mL Final   09/02/2016 <2.1 <2.1 [Log_IU]/mL Final   08/31/2016 Not Calculated <2.1 [Log_IU]/mL Final   05/28/2016 Not Calculated <2.1 [Log_IU]/mL Final       No results found for: H6RES    EBV DNA Copies/mL   Date Value Ref Range Status   09/18/2018 32,906 (A) EBVNEG^EBV DNA Not Detected [Copies]/mL Final   04/20/2017 61,922 (A) EBVNEG [Copies]/mL Final   03/10/2017 89,252 (A) EBVNEG [Copies]/mL Final   02/17/2017 139,026 (A) EBVNEG [Copies]/mL Final   08/30/2016 2,393 (A) EBVNEG [Copies]/mL Final   05/28/2016 9,498 (A) EBVNEG [Copies]/mL Final       BK Virus Testing     BK Virus Result   Date Value Ref Range Status   04/20/2017 BK Virus DNA Not Detected BKNEG copies/mL Final   03/09/2017 BK Virus DNA Not Detected BKNEG copies/mL Final   02/17/2017 BK Virus DNA Not Detected BKNEG copies/mL Final   10/12/2012 <1000 <1000 copies/mL Final   11/07/2010 <1000 <1000 copies/mL Final       Parvovirus Testing    Recent Labs   Lab Test 09/02/16  0838   PRVG 0.28   PRVM 0.11   PRVSP EDTA PLASMA   PRVPC Not Detected  (Note)  NOT DETECTED - A negative result does not rule out the  presence of PCR inhibitors in the  patient specimen or assay  specific nucleic acid in concentrations below the level of  detection by the assay.  INTERPRETIVE INFORMATION: Parvovirus B19 By PCR  Test developed and characteristics determined by [facility]. See Compliance Statement B: [facility]  Performed by [facility],  [address] [place] [address] [phone]  [email or web address], Fco Taveras MD, Lab. Director         Adenovirus Testing  No lab results found.    Invalid input(s): ADENAB, ADAG, ADENOVIRUS, ADQT    Hepatitis B Testing     Recent Labs   Lab Test 03/09/17  1310   HEPBANG Nonreactive        Hepatitis C Antibody   Date Value Ref Range Status   03/09/2017  NR Final    Nonreactive   Assay performance characteristics have not been established for newborns,   infants, and children     09/19/2002 Negative NEG Final       CMV Antibody IgG   Date Value Ref Range Status   09/01/2016 (H) 0.0 - 0.8 AI Final    >8.0  Positive   Antibody index (AI) values reflect qualitative changes in antibody   concentration that cannot be directly associated with clinical condition or   disease state.       CMV Antibody IgM   Date Value Ref Range Status   09/01/2016 2.7 (H) 0.0 - 0.8 AI Final     Comment:     Positive   Antibody index (AI) values reflect qualitative changes in antibody   concentration that cannot be directly associated with clinical condition or   disease state.       No results found for: EBVCAG, EBIG2, EBIGM, EBVIGG, EBIGG, EBVAGN, YJ2780, TOXG  No results found for: H1IGG, H2IGG, EBVCAM    Imaging:  Recent Results (from the past 48 hour(s))   POC US ECHO LIMITED    Impression    Limited Bedside Cardiac Ultrasound, performed and interpreted by me.   Indication: Hypotension/shock.  Parasternal long axis and parasternal short axis views were acquired.   Image quality was fair.    Findings:    Global left ventricular function appears intact.  Chambers do not appear dilated.  There is no evidence of free fluid within the  pericardium.  There is sclerosis along the aortic valve    IMPRESSION: Grossly intact left ventricular function and normal chamber size.  No pericardial effusion.  Sclerotic aortic valve     CT Head w/o Contrast    Narrative    CT HEAD W/O CONTRAST 2/28/2019 1:04 AM    Provided History: Altered level of consciousness (LOC), unexplained  ICD-10:    Comparison: CT of the head dated 9/16/2018..    Technique: Using multidetector thin collimation helical acquisition  technique, axial, coronal and sagittal CT images from the skull base  to the vertex were obtained without intravenous contrast.     Findings:    No intracranial hemorrhage, mass effect, or midline shift. The  ventricles are proportionate to the cerebral sulci. Generalized  cerebral volume loss and periventricular leukoaraiosis similar to  prior exam. The gray to white matter differentiation of the cerebral  hemispheres is preserved. The basal cisterns are patent.    The visualized paranasal sinuses are clear. The mastoid air cells are  clear.    Vascular calcifications in left and right cavernous carotid arteries  and the visible portions of the vertebral arteries.       Impression    Impression: No acute intracranial pathology.    I have personally reviewed the examination and initial interpretation  and I agree with the findings.    TAWANDA MAGANA MD   XR Chest Port 1 View    Narrative    Exam: XR CHEST PORT 1 VW, 2/28/2019 1:50 AM    Indication: altered mental status    Comparison: Chest radiograph dated 9/15/2018.    Findings:   Single AP view of the chest. The cardiac silhouette is mildly  enlarged. Vascular calcifications of the aortic arch. No new focal  airspace consolidation pleural effusion or pneumothorax. Unchanged  elevation of the right hemidiaphragm. Prominent pulmonary vasculature  similar to prior exam. Visualized portion of the upper abdomen is  unremarkable.      Impression    Impression:   1. No acute cardiopulmonary findings.  2. Mild  enlargement of the cardiac silhouette similar to prior exam.    I have personally reviewed the examination and initial interpretation  and I agree with the findings.    HELIO DALEY MD   US Renal Transplant    Narrative    EXAMINATION: US RENAL TRANSPLANT,  2/28/2019 7:24 PM     COMPARISON: Ultrasound 9/16/2018    HISTORY: Elevated creatinine with a history of transplant    TECHNIQUE:  Grey-scale, color Doppler and spectral flow analysis.    FINDINGS:  The transplant kidney is located in the right lower quadrant, and  measures 12.3 cm length. Parenchyma is of normal thickness and  echogenicity. 2 mm anechoic, benign cyst on the superior pole of the  transplant. Mild pelviectasis. No perinephric fluid collection.    Renal artery flow:   74 cm/sec peak systolic at hilum.  91 cm/sec peak systolic at anastomosis.    Lower Pole Arcuate artery:  0.85 resistive index.     Mid pole Arcuate artery:  1 resistive index.     Upper Pole Arcuate artery:  1 resistive index.    Renal Vein Flow:  80 cm/sec at hilum.   50 cm/sec at anastomosis.    Iliac artery flow:  111 cm/sec peak systolic above anastomosis.  55 cm/sec peak systolic below anastomosis.    Iliac vein flow:  Patent above and below the anastomosis.      Impression    IMPRESSION:  1.  Minimal pelviectasis otherwise normal gray scale appearance of the  transplant kidney.  2.  Elevated arcuate resistive indices, increased from prior.    I have personally reviewed the examination and initial interpretation  and I agree with the findings.    MATILDE SABILLON MD   XR Chest Port 1 View    Narrative    Exam: XR CHEST PORT 1 VW, 3/1/2019 1:20 AM    Indication: chest pain    Comparison: Chest radiograph dated 2/28/2018.    Findings:   Single AP view of the chest. Unchanged enlargement of the cardiac  silhouette. Vascular calcifications of the aortic arch. Stable  elevation of the right hemidiaphragm. Slightly increased perihilar  airspace opacities and interstitial  opacities. No pleural effusion or  pneumothorax. Upper abdomen is unremarkable.      Impression    Impression:   1. Increased perihilar and interstitial opacities compared to prior  exam. Findings suggestive of pulmonary edema versus infection.  2. Unchanged enlargement of the cardiac silhouette.    I have personally reviewed the examination and initial interpretation  and I agree with the findings.    HELIO DALEY MD

## 2019-03-01 NOTE — PROGRESS NOTES
Spoke with Chang, patient's , via telephone and updated him on patient's condition, state of mind, and recent lab results. Chang expressed his concern regarding the event with the tacrolimus drip overnight and inquired about any side effects that she might have and whether we have seen these. This nurse expressed their concern about the patient's propensity to refuse cares and interventions this morning and was expressly permitted to resume cares and interventions on the patient, though she will refuse these interventions. Chang went further to state that this confusion and refusal of cares is normal for her when she has a UTI, or sepsis. Chang's questions were answered to the best of this nurse's ability.    To further avoid confusion and clarify any  of the above statements:    The patient's  gives the nursing, medical, and all other hospital staff members expressed permission to resume care and on Gem Jade regardless of if she refuses care.    Will continue to monitor for safety and comfort.    Gabriel Beaulieu RN

## 2019-03-01 NOTE — PLAN OF CARE
Neuro: Lethargic, arouses to voice. Oriented to self. Follows simple commands. Strength 3/4 in all extremities. Pupils equal, fixed.   Cardiac: Sinus rhythm, t- max 100.8.  Resp: On 5 L oxymask.  GI: NPO  : Johnson patent w/ adequate output, IV lasix x1.   Skin: Interdry under pannus, scattered bruising.   Muscle/Mobility: Turning q2h.   Pain: No indicators of pain present.   Lines/Drains: R & L PIV. D5LR running at 125cc/hr.     PLAN: Transfer out of ICU once bed available.

## 2019-03-01 NOTE — PROGRESS NOTES
[facility], [facility] Progress Note - [facility]       Main Plans for Today   Continue vanc and zosyn    Follow up susceptibility on UC/BC  NS @ 125 ml/hr   Follow neuro exam   Q4H BMPs   High sliding scale insulin   Transplant Nephrology Consulted  Transplant ID consulted       Assessment & Plan   Gem Jade is a 73 year old female admitted on 2/28/2019. She has a history of DM2, polyneuropathy, kidney transplant (1999) and recurrent UTIs secondary to indwelling rico complicated by toxic encephalopathy and is admitted for sepsis likely secondary to a urinary source and toxic metabolic encephalopathy.    #Acute encephalopathy likely due to sepsis   #Severe Sepsis likely due to urinary tract infection  Febrile in ED with mild leukocytosis, normal lactate. Procal is moderate. Has AMS which has slowly improved with treatment of sepsis and now is A/O x 3 but combative at times. Has a history of recurrent infections with indwelling rico, all E coli have been pan-sensitive in the past few months. Head CT shows no acute intracranial path.  Changed rico in the ED. UA appears infectious with culture of urine and blood both growing e coli and sensitivities pending. Most likely this is sepsis from a urinary source.   -Continue Zosyn and Vanc  -Follow up urine and blood culture sensitivities   -Daily CBC  -Pulse ox, oxygen as needed  -Tele  - ml/hr     #Tacrolimus overdose  #Hyperkalemia  # LDKT (1999)   patient received IV tacrolimus 250 ml as bolus, equal to 15 mg PO. Home dose is 2 mg BID. Has altered mental status but improving and likely related to improving sepsis. Also had hyperkalemia treated with lasix and insulin which has remained stable. EKG has shown stable qtc. Cr has been slowly increasing but continues to have urine output.   - hold tacrolimus   - bicarb complete   - Q4H BMP   - Q4H neuro checks   - continue to trend BG and HSSI   - appreciate  assistance of pharmacy and transplant nephrology      #Type II DM  Home regimen is novolog TID with meals and lantus 24 units nightly  -Lantus 12 U at bedtime   -HSSI  -Glucose checks     #Hypertension   #paroxysymal A fib  Hold PO meds until AMS resolves  -As needed hydralazine IV while NPO  -PTA pravastatin  -PTA losartan  -PTA labetalol     #Hypothyroidism  Hold PO meds until AMS resolves  -PTA synthroid     #Neuropathy  Hold PO meds until AMS resolves  -PTA gabapentin  -PTA baclofen    # Chest pain   Patient complained of chest pain this am. Unable to obtain further history. Hemodynamically stable. EKG this am was unremarkable for ischemic changes. Low concern for cardiac etiology, more likely msk however we will do ekg and trop given risk factors.   - repeat EKG   - troponin with next lab draw     # Pain Assessment:  Current Pain Score 3/1/2019   Patient currently in pain? denies   Pain score (0-10) -   Pain location -   Pain descriptors -   CPOT pain score -   Gem swan pain level was assessed and she currently denies pain.      Diet: NPO for Medical/Clinical Reasons Except for: No Exceptions  Fluids:  ml/hr   DVT Prophylaxis: Heparin SQ  Code Status: Full Code    Disposition Plan   Expected discharge: 4 - 7 days, recommended to prior living arrangement once antibiotic plan established and SIRS/Sepsis treated. Dispo:          Entered: Gregg Cevallos 03/01/2019, 1:11 PM   Information in the above section will display in the discharge planner report.      The patient's care was discussed with the Attending Physician, Dr. Manzanares.    Gregg Cevallos  [facility]  Pager: [phone]  Please see sticky note for cross cover information    Interval History   Overdose on tacrolimus overnight. Became combative and disoriented. Initially denied pain this am now complaining of chest pain but unwilling to further describe. Mental status improving slowly.   Further history or  ROS is limited by AMS.     Physical Exam   Vital Signs: Temp: 97.2  F (36.2  C) Temp src: Bladder BP: 149/73 Pulse: 86 Heart Rate: 86 Resp: 12 SpO2: 100 % O2 Device: Nasal cannula Oxygen Delivery: 4 LPM  Weight: 248 lbs .28 oz  General Appearance: Sleepy, arouses to voice, no distress, comfortable, oriented x 3  Respiratory: lungs clear to auscultation without rales, rhonchi, wheeze, no respiratory distress   Cardiovascular: regular rate and rhythm, no murmur, no nuno, no rub, mild chest wall tenderness   GI: obese, soft, non-distended, no guarding, no rebound, non-tender   Skin: warm, dry   Other: Patient awake, alert, and oriented x3 on formal testing but somewhat confused with poor memory when discussing hospital events       Data

## 2019-03-01 NOTE — PROGRESS NOTES
Admitted/transferred from: Emergency Department  Reason for admission/transfer: Altered Mental Status  Patient status upon admission/transfer: Stable.   Interventions: As ordered by MD   Plan: As ordered by MD  2 RN skin assessment: completed by Sarah MERRILL RN and Rich SAPP   Result of skin assessment and interventions/actions: scattered bruising, open skin tears under pannus. Interdry placed.   Height, weight, drug calc weight: done  Patient belongings: In bedside closet.   MDRO education (if applicable): Pt unable to be educated.

## 2019-03-01 NOTE — PLAN OF CARE
"Overnight the pt's IV tacrolimus infusion was accidentally administered over a bolus. The pump was programmed per the order but after RN was in other pt's room for a while came back to notice the whole bag was gone. The pump was still programmed as per order. Pharmacy/MD team/charge nurse/ANS/ family all notified and plan was formed to closely monitor patient. IV pump was removed and given to manager.     Neuro: Pt was originally somnolent at the beginning of the shift, then became alert and oriented x 3, to now combative and confused. MD aware of changes.  states that \"she usually ends up in restraints when hospitalized\". Q2hour neuro checks done overnight due to medication error. Moves all 4 extremities upper > lower. Follows simple commands when willing. PERRL.     CV: SR 70-80s no ectopy. Hypertensive SBP 180s requiring 10 hydralazine given x1. SBP goal < 180, DBP goal < 100 maintained after that. +2 pulses. Afebrile. Pt had a complaint of chest pain x1 after hydralazine administration, 12 lead, chest xray, trops were completed all WNL and pain resolved on own.     Resp: RA normally when awake, CPAP tolerated overnight only for short time, then transitioned to NC due to combative nature. LS clear/diminished.     GI: NPO, no BM, BS hypoactive.     : Johnson in place, urine sent. Adequate ouput, 40 lasix given x 1.     Skin: R side panus skin tear, intradry in all skin folds. Bruising.     Endo: BG in 200s, MD aware. Increased sliding scale insulin to Q2 hours and helped Hyperkalemia shift.     Access: R and L PIV. Pt removed previous R PIV on own.     Gtt: D10 @ 75, D5 with bicarb @ 50.     Labs: K at 6.0 overnight given IV insulin/dextrose/D10 per protocol. Now 4.9. Creat increased to 2.19. WBC 10.9. Continue to closely monitor labs.     Plan: 6B status, follow up with transplant/nephrology this AM and decide plan of care at this point. Continue to closely monitor neuro/labs/vitals/I&Os. Nicola HIDALGO with " concerns.

## 2019-03-01 NOTE — PROGRESS NOTES
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   She has recurrent UTI and marginal renal function    # Sepsis bacteremia UTI  - Urine culture likely Ecoli, Blood c/s   - cont zosyn f/u c/s , consider  stop vancomycin now     # tacrolimus toxicity: she received IV tacrolimus and accidentally overdose with 250cc bolus which equal to 15mg po tacrolimus, tacrolimus level still pending, keep checking neuro exam q4hr, monitor for seizure and press,, monitor for type4 RTA and hyperkalemia, monitor for renal failure, check EKG for qtc prolongation.   -- keep holding tacrolimus now.     # Immunosuppression: Tacrolimus immediate release (goal  4-6) and Prednisone (dose  5 mg daily)   - Changes: No, hold tacrolimus now and await level.     # Hypertension/Volume Status: Controlled; Goal BP: < 140/90   - Changes: No    # Diabetes: Controlled    # Anemia in chronic renal disease: Hgb: Stable   - Iron studies: Not checked recently    # Mineral Bone Disorder:    - Secondary renal hyperparathyroidism; PTH level is: Not checked recently  - Vitamin D; level is: Not checked recently  - Calcium; level is: Normal  - Phosphorus; level is: Normal    # Electrolytes:   - Potassium; level: Normal, hyperkalemia overnight likley RTA tacrolimus overdose, now improved.   - Magnesium; level: Normal  - Bicarbonate; level: Low nongap acidosis could be RTA vs hyperchloremic acidosis, if mentation is not improving then  "repeat VBG because she might need correction of her metabolic acidosis with bicarb contain solution       # Skin Cancer Risk:    - Discussed sun protection and recommend regular follow up with Dermatology.    # Medical Compliance: Yes    # Transplant History:  Etiology of kidney failure: diabetic nephropathy  Tx: LDKT  Transplant: 10/27/1999 (Kidney)  Donor Type: Living Donor Class:   Significant changes in immunosuppression: None  Significant transplant-related complications: recurrent UTIs    Recommendations were communicated to the primary team via this note.    Seen and discussed with Dr. Av Carpio MD  Pager: [phone]    Interval History   Overnight she accidentally receive IV tacrolimus and was overdosed with almost 15mg, she is awake and alert but slight confusion, she start complaining of arm and chest pain this am, no seizure no neurological deficit.     Review of Systems   4 point ROS was obtained and negative except as noted in the interval history    Physical Exam   Temp  Av.8  F (37.1  C)  Min: 96.8  F (36  C)  Max: 101.4  F (38.6  C)      Pulse  Av.5  Min: 67  Max: 88 Resp  Av  Min: 8  Max: 20  SpO2  Av.6 %  Min: 83 %  Max: 100 %     /73   Pulse 86   Temp 97.2  F (36.2  C)   Resp 12   Ht 1.753 m (5' 9\")   Wt 112.5 kg (248 lb 0.3 oz)   SpO2 100%   BMI 36.63 kg/m     Date 19 0700 - 19 0659   Shift 2421-8190 6436-6651 3144-2243 24 Hour Total   INTAKE   I.V. 200   200   Shift Total(mL/kg) 200(1.78)   200(1.78)   OUTPUT   Urine 300   300   Shift Total(mL/kg) 300(2.67)   300(2.67)   Weight (kg) 112.5 112.5 112.5 112.5      Admit Weight: 108.9 kg (240 lb 1.6 oz)   GENERAL APPEARANCE: alert and no distress  HENT: mouth without ulcers or lesions  LYMPHATICS: no cervical or supraclavicular nodes  RESP: lungs clear to auscultation - no rales, rhonchi or wheezes  CV: regular rhythm, normal rate, no rub, no murmur  EDEMA: no LE edema " bilaterally  ABDOMEN: soft, nondistended, nontender, bowel sounds normal  MS: extremities normal - no gross deformities noted, no evidence of inflammation in joints, no muscle tenderness  SKIN: no rash    Data   All labs reviewed by me.  CMP  Recent Labs   Lab 03/01/19  0545 03/01/19  0426 03/01/19  0043 02/28/19 2238  02/28/19 0612 02/28/19 0035    141 140 139   < > 139   < > 135   POTASSIUM 4.9 4.9 6.0* 5.6*   < > 5.5*   < > 6.3*  6.4*   CHLORIDE 112* 109 112* 112*   < > 112*   < > 108   CO2 19* 18* 20 20   < > 20   < > 23   ANIONGAP 11 14 8 7   < > 7   < > 5   * 260* 229* 210*   < > 115*   < > 90   BUN 56* 58* 58* 58*   < > 62*   < > 70*   CR 2.21* 2.19* 2.09* 2.09*   < > 2.26*   < > 2.27*   GFRESTIMATED 22* 22* 23* 23*   < > 21*   < > 21*   GFRESTBLACK 25* 25* 27* 27*   < > 24*   < > 24*   ROGER 8.3* 8.6 8.5 8.4*   < > 8.4*   < > 8.6   MAG  --   --   --  2.5*  --  2.6*  --   --    PHOS 3.8 3.9 4.2 4.0  --  3.7   < >  --    PROTTOTAL  --   --   --   --   --   --   --  7.8   ALBUMIN  --   --   --   --   --   --   --  3.2*   BILITOTAL  --   --   --   --   --   --   --  0.4   ALKPHOS  --   --   --   --   --   --   --  103   AST  --   --   --   --   --   --   --  31   ALT  --   --   --   --   --   --   --  31    < > = values in this interval not displayed.     CBC  Recent Labs   Lab 03/01/19  0545 02/28/19  1041 02/28/19  0612 02/28/19  0038 02/28/19  0035 02/27/19  1900   HGB 9.4*  --  9.8* 11.6* 10.7* 10.0*   WBC 10.9  --  12.8*  --  11.4* 9.9   RBC 3.21*  --  3.35*  --  3.70* 3.46*   HCT 31.6*  --  33.0*  --  36.5 32.4*   MCV 98  --  99  --  99 94   MCH 29.3  --  29.3  --  28.9 28.9   MCHC 29.7*  --  29.7*  --  29.3* 30.9*   RDW 14.3  --  14.0  --  13.9 14.1   * 100* 109*  --  101* 113*     INR  Recent Labs   Lab 02/28/19  0035   INR 1.09     ABG  Recent Labs   Lab 02/28/19  0612 02/28/19  0336   O2PER 96.0 95.0      Urine Studies  Recent Labs   Lab Test 02/28/19  2200 02/28/19  0215  02/27/19  1900 01/24/19 2010 01/11/18  1614 12/19/17  1620 11/13/17  1646   COLOR Light Yellow Yellow Yellow Light Yellow   < > Yellow Pink Yellow   APPEARANCE Slightly Cloudy Cloudy Slightly Cloudy Cloudy   < > Turbid Slightly Cloudy Turbid   URINEGLC 70* Negative Negative 30*   < > 100* 100* 500*   URINEBILI Negative Negative Negative Negative   < > Negative Negative Negative   URINEKETONE Negative Negative Negative Negative   < > Negative Negative Negative   SG 1.009 1.015 1.010 1.012   < > 1.025 1.015 1.015   UBLD Moderate* Large* Large* Moderate*   < > Large* Large* Moderate*   URINEPH 5.0 5.5 6.0 5.5   < > 6.0 6.5 6.5   PROTEIN 30* 100* 100* 100*   < > >=300* 100* 100*   UROBILINOGEN  --   --   --   --   --  0.2 0.2 0.2   NITRITE Negative Positive* Negative Negative   < > Positive* Negative Negative   LEUKEST Large* Large* Large* Large*   < > Large* Large* Small*   RBCU 31 64* 159* 93*   < > 5-10* 25-50* 25-50*   WBCU >182* 1,760* 139* >182*   < > >100* * >100*    < > = values in this interval not displayed.     Recent Labs   Lab Test 10/10/18  1503 09/17/18  2040 04/24/17  1930 10/07/11  1230 09/27/11  1600   UTPG 5.27* 2.96* 15.28* Test canceled by PCU/Clinic  Charge credited PER RADHA FALCON 0.07     PTH  Recent Labs   Lab Test 09/18/18  0627 03/11/17  0859 02/16/17  2303 10/07/11  1215 09/27/11  1600   PTHI 104* 334* 9* Test canceled by PCU/Clinic  Charge credited PER RADHA FALCON 53     Iron Studies  Recent Labs   Lab Test 02/17/17  2358 09/04/16  0815 08/07/16  0750 08/09/12  0819 07/14/12  0720 05/08/12 10/07/11  1215 09/27/11  1600   IRON 50 124 12* 68 154 68 Test canceled by PCU/Clinic  Charge credited CANCELED PER RADHA FALCON CORRECTED ON 10/07 AT 1459: PREVIOUSLY REPORTED AS 69 26*   * 128* 166* 188* 169*  --  Test canceled by PCU/Clinic  Charge credited CANCELED PER RADHA FALCON CORRECTED ON 10/07 AT 1459: PREVIOUSLY REPORTED  227*   IRONSAT 29 97* 7* 36 91*  --  Test  canceled by PCU/Clinic  Charge credited CANCELED PER RADHA EZEKIEL CORRECTED ON 10/07 AT 1459: PREVIOUSLY REPORTED AS 30 12*   GIANA 1,069* 2,676* 645*  --  444* 630?  --  72       IMAGING:  All imaging studies reviewed by me.     MEDICATIONS:  Current Facility-Administered Medications   Medication     acetaminophen (TYLENOL) Suppository 650 mg     calcium gluconate 10 % injection 2 g     glucose gel 15-30 g    Or     dextrose 50 % injection 25-50 mL    Or     glucagon injection 1 mg     glucose gel 15-30 g    Or     dextrose 50 % injection 25-50 mL    Or     glucagon injection 1 mg     heparin sodium injection 5,000 Units     hydrALAZINE (APRESOLINE) injection 10 mg     insulin aspart (NovoLOG) inj (RAPID ACTING)     insulin glargine (LANTUS PEN) injection 12 Units     levETIRAcetam (KEPPRA) intermittent infusion 500 mg     melatonin tablet 1 mg     methylPREDNISolone sodium succinate (solu-MEDROL) injection 4 mg     naloxone (NARCAN) injection 0.1-0.4 mg     naloxone (NARCAN) injection 0.4 mg     piperacillin-tazobactam (ZOSYN) 3.375 g vial to attach to  mL bag     sodium bicarbonate 150 mEq in 5% dextrose for infusion     sodium chloride 0.9% infusion     vancomycin place smith - receiving intermittent dosing

## 2019-03-01 NOTE — PROGRESS NOTES
"Family Medicine Brief Progress Note    S: Received page from ICU RN that patient had accidentally received 250 ml of Tacrolimus drip (that was ordered for 2.8 ml/hr gtt drip). RN believes that the IV pump has malfunctioned. According to the RN, the pump was initiated at the appropriate settings, however, it appeared to have bolused the tacrolimus instead. I confirmed the order was for 2.8 ml/hr tacrolimus gtt drip. Patient unfortunately received 5 mg tacrolimus IV which is equivalent to 15 mg tacrolimus PO. For reference, this 250 ml tacrolimus in 1 hour is greater than 7.5x of her home tacrolimus.     O:  /62 (BP Location: Right arm)   Pulse 79   Temp 98.1  F (36.7  C)   Resp 11   Ht 1.753 m (5' 9\")   Wt 108.9 kg (240 lb 1.6 oz)   SpO2 96%   BMI 35.46 kg/m    Gen: lying in bed, opens eyes occasionally, oriented to place and name  Neuro exam: Unchanged mental status from baseline exam around 7 PM today  CV: Regular rate rhythm no murmurs  Lungs: Ctab   Ext: well perfused    Assessment:   Tacrolimus causes neurotoxicity (seizures, posterior reversible encephalopathy syndrome) and nephrotoxicity (Type IV RTA, renal failure, hypertension with vasoconstriction). Tacrolimus can also cause QTc prolongation (current EKG is 470).   Tacrolimus is unable to dialyzed. Supportive cares were recommended by Nephro.    Spoke with Nephrology Fellow who spoke to his attending. Also spoke with inpatient pharmacy. Recs as below. EKG now showed no acute changes from EKG earlier today with some prolongation of QTc from 440 to 470.    Plan:   -Stopped tacrolimus immediately  -EKG now shows Qtc of 470, repeat EKG at 6 AM  -Monitor UOP closely  -UA with micro STAT, repeat UA micro at 6 AM  -Continue Tele, pay close attention to hypertension  -Neuro checks Q2H  -Vital checks Q2H  -Q4H BMP, watch K and Cr closely  -Q4H Magnesium and Q4H phos, replace as needed  -Keep Tacrolimus level in AM per Nephro recs  -Avoid QTc prolonging " "drugs  -Page nephro fellow if K greater than or equal to 5.8 per their request  -My senior, Dr. Worthington, spoke with patient's  who is understandably frustrated, questions are all answered. I will call  at 1 AM with updates.   -MANUEL in morning    Shay Fernando MD  [facility] PGY-1  [phone]    Update: Patient became delirious overnight and combative requiring soft restraints.  says \"she does this normally whenever she is in the hospital\".  K increased to 6.0 overnight, called nephrology fellow as requested for further recs. He rec'd we give IV insulin and 40 mg IV lasix. He also recommended we stop D5 fluids and start a low rate of Sodium bicarb with D5 IVF. She continues to have good UOP. Stable BPs after given 1 dose of hydralazine overnight with systolics in the 180s.   Plan:  -Given IV insulin and 40 mg IV lasix for hyperkalemia per nephro recs. K now down to 4.9. Another BMP pending for 6 AM.   -Cr stable at 2.1 at 4:30 AM recheck. Continue to monitor closely.  -Glucose continues to be consistently over 200s, instructed RN that they can check Q2H and give correction insulin Q2H with goal of BG less than 180s.  -We stopped current IVF and started 150 Sodium bicarb with D5 infusion at 50 ml/hr for now. Will need to follow up nephrology recs in AM for further management.   -Continue to monitor BP and UOP closely  -Updated Keshawn,  of patient, and he would like another call after nephrology sees her today.   "

## 2019-03-01 NOTE — PROGRESS NOTES
Patient is exceptionally belligerent and non-compliant with treatment team. Patient is oriented x3 but appears delusional and delirious upon exam. Patient has, so far, asked for nephrologist, phlebotomist, and myself to leave the room and refuses the oxygen sensor, despite being on continuous oxygen therapy. Patient has also refused subcutaneous insulin, this morning as well. Questionable as to how much patient can refuse, at this time. Will wait for patient's  to call and/or arrive to make decisions for her.    Will continue to monitor for safety and comfort.    Gabriel Beaulieu RN

## 2019-03-02 LAB
ANION GAP SERPL CALCULATED.3IONS-SCNC: 10 MMOL/L (ref 3–14)
ANION GAP SERPL CALCULATED.3IONS-SCNC: 12 MMOL/L (ref 3–14)
ANION GAP SERPL CALCULATED.3IONS-SCNC: 7 MMOL/L (ref 3–14)
ANION GAP SERPL CALCULATED.3IONS-SCNC: 8 MMOL/L (ref 3–14)
BACTERIA SPEC CULT: ABNORMAL
BUN SERPL-MCNC: 45 MG/DL (ref 7–30)
BUN SERPL-MCNC: 48 MG/DL (ref 7–30)
BUN SERPL-MCNC: 50 MG/DL (ref 7–30)
BUN SERPL-MCNC: 54 MG/DL (ref 7–30)
CALCIUM SERPL-MCNC: 7.9 MG/DL (ref 8.5–10.1)
CALCIUM SERPL-MCNC: 8 MG/DL (ref 8.5–10.1)
CALCIUM SERPL-MCNC: 8.2 MG/DL (ref 8.5–10.1)
CALCIUM SERPL-MCNC: 8.4 MG/DL (ref 8.5–10.1)
CHLORIDE SERPL-SCNC: 112 MMOL/L (ref 94–109)
CHLORIDE SERPL-SCNC: 113 MMOL/L (ref 94–109)
CO2 SERPL-SCNC: 19 MMOL/L (ref 20–32)
CO2 SERPL-SCNC: 21 MMOL/L (ref 20–32)
CO2 SERPL-SCNC: 24 MMOL/L (ref 20–32)
CO2 SERPL-SCNC: 24 MMOL/L (ref 20–32)
CREAT SERPL-MCNC: 1.92 MG/DL (ref 0.52–1.04)
CREAT SERPL-MCNC: 2.1 MG/DL (ref 0.52–1.04)
CREAT SERPL-MCNC: 2.17 MG/DL (ref 0.52–1.04)
CREAT SERPL-MCNC: 2.25 MG/DL (ref 0.52–1.04)
ERYTHROCYTE [DISTWIDTH] IN BLOOD BY AUTOMATED COUNT: 14.6 % (ref 10–15)
GFR SERPL CREATININE-BSD FRML MDRD: 21 ML/MIN/{1.73_M2}
GFR SERPL CREATININE-BSD FRML MDRD: 22 ML/MIN/{1.73_M2}
GFR SERPL CREATININE-BSD FRML MDRD: 23 ML/MIN/{1.73_M2}
GFR SERPL CREATININE-BSD FRML MDRD: 26 ML/MIN/{1.73_M2}
GLUCOSE BLDC GLUCOMTR-MCNC: 121 MG/DL (ref 70–99)
GLUCOSE BLDC GLUCOMTR-MCNC: 124 MG/DL (ref 70–99)
GLUCOSE BLDC GLUCOMTR-MCNC: 190 MG/DL (ref 70–99)
GLUCOSE BLDC GLUCOMTR-MCNC: 76 MG/DL (ref 70–99)
GLUCOSE BLDC GLUCOMTR-MCNC: 84 MG/DL (ref 70–99)
GLUCOSE BLDC GLUCOMTR-MCNC: 97 MG/DL (ref 70–99)
GLUCOSE SERPL-MCNC: 108 MG/DL (ref 70–99)
GLUCOSE SERPL-MCNC: 118 MG/DL (ref 70–99)
GLUCOSE SERPL-MCNC: 132 MG/DL (ref 70–99)
GLUCOSE SERPL-MCNC: 82 MG/DL (ref 70–99)
HCT VFR BLD AUTO: 28.9 % (ref 35–47)
HGB BLD-MCNC: 8.8 G/DL (ref 11.7–15.7)
Lab: ABNORMAL
MAGNESIUM SERPL-MCNC: 1.9 MG/DL (ref 1.6–2.3)
MAGNESIUM SERPL-MCNC: 2.2 MG/DL (ref 1.6–2.3)
MAGNESIUM SERPL-MCNC: 2.3 MG/DL (ref 1.6–2.3)
MAGNESIUM SERPL-MCNC: 2.4 MG/DL (ref 1.6–2.3)
MAGNESIUM SERPL-MCNC: 2.4 MG/DL (ref 1.6–2.3)
MCH RBC QN AUTO: 29.6 PG (ref 26.5–33)
MCHC RBC AUTO-ENTMCNC: 30.4 G/DL (ref 31.5–36.5)
MCV RBC AUTO: 97 FL (ref 78–100)
PHOSPHATE SERPL-MCNC: 3 MG/DL (ref 2.5–4.5)
PHOSPHATE SERPL-MCNC: 3.3 MG/DL (ref 2.5–4.5)
PHOSPHATE SERPL-MCNC: 3.3 MG/DL (ref 2.5–4.5)
PHOSPHATE SERPL-MCNC: 3.5 MG/DL (ref 2.5–4.5)
PHOSPHATE SERPL-MCNC: 3.6 MG/DL (ref 2.5–4.5)
PLATELET # BLD AUTO: 107 10E9/L (ref 150–450)
POTASSIUM SERPL-SCNC: 4.4 MMOL/L (ref 3.4–5.3)
POTASSIUM SERPL-SCNC: 4.5 MMOL/L (ref 3.4–5.3)
POTASSIUM SERPL-SCNC: 5.2 MMOL/L (ref 3.4–5.3)
POTASSIUM SERPL-SCNC: 5.5 MMOL/L (ref 3.4–5.3)
RBC # BLD AUTO: 2.97 10E12/L (ref 3.8–5.2)
SODIUM SERPL-SCNC: 142 MMOL/L (ref 133–144)
SODIUM SERPL-SCNC: 143 MMOL/L (ref 133–144)
SODIUM SERPL-SCNC: 144 MMOL/L (ref 133–144)
SODIUM SERPL-SCNC: 146 MMOL/L (ref 133–144)
SPECIMEN SOURCE: ABNORMAL
TACROLIMUS BLD-MCNC: 14.5 UG/L (ref 5–15)
TME LAST DOSE: NORMAL H
WBC # BLD AUTO: 7.1 10E9/L (ref 4–11)

## 2019-03-02 PROCEDURE — 25000128 H RX IP 250 OP 636: Performed by: FAMILY MEDICINE

## 2019-03-02 PROCEDURE — 36415 COLL VENOUS BLD VENIPUNCTURE: CPT | Performed by: STUDENT IN AN ORGANIZED HEALTH CARE EDUCATION/TRAINING PROGRAM

## 2019-03-02 PROCEDURE — 84100 ASSAY OF PHOSPHORUS: CPT | Performed by: STUDENT IN AN ORGANIZED HEALTH CARE EDUCATION/TRAINING PROGRAM

## 2019-03-02 PROCEDURE — 25800030 ZZH RX IP 258 OP 636: Performed by: STUDENT IN AN ORGANIZED HEALTH CARE EDUCATION/TRAINING PROGRAM

## 2019-03-02 PROCEDURE — A9270 NON-COVERED ITEM OR SERVICE: HCPCS | Mod: GY | Performed by: FAMILY MEDICINE

## 2019-03-02 PROCEDURE — 25000132 ZZH RX MED GY IP 250 OP 250 PS 637: Mod: GY | Performed by: FAMILY MEDICINE

## 2019-03-02 PROCEDURE — 85027 COMPLETE CBC AUTOMATED: CPT | Performed by: STUDENT IN AN ORGANIZED HEALTH CARE EDUCATION/TRAINING PROGRAM

## 2019-03-02 PROCEDURE — 25000125 ZZHC RX 250: Performed by: FAMILY MEDICINE

## 2019-03-02 PROCEDURE — 25000125 ZZHC RX 250: Performed by: STUDENT IN AN ORGANIZED HEALTH CARE EDUCATION/TRAINING PROGRAM

## 2019-03-02 PROCEDURE — 00000146 ZZHCL STATISTIC GLUCOSE BY METER IP

## 2019-03-02 PROCEDURE — 84100 ASSAY OF PHOSPHORUS: CPT | Performed by: FAMILY MEDICINE

## 2019-03-02 PROCEDURE — 12000004 ZZH R&B IMCU UMMC

## 2019-03-02 PROCEDURE — 83735 ASSAY OF MAGNESIUM: CPT | Performed by: FAMILY MEDICINE

## 2019-03-02 PROCEDURE — 80197 ASSAY OF TACROLIMUS: CPT | Performed by: STUDENT IN AN ORGANIZED HEALTH CARE EDUCATION/TRAINING PROGRAM

## 2019-03-02 PROCEDURE — 80048 BASIC METABOLIC PNL TOTAL CA: CPT | Performed by: STUDENT IN AN ORGANIZED HEALTH CARE EDUCATION/TRAINING PROGRAM

## 2019-03-02 PROCEDURE — 83735 ASSAY OF MAGNESIUM: CPT | Performed by: STUDENT IN AN ORGANIZED HEALTH CARE EDUCATION/TRAINING PROGRAM

## 2019-03-02 RX ORDER — DIPHENOXYLATE HCL/ATROPINE 2.5-.025MG
1 TABLET ORAL 4 TIMES DAILY PRN
Status: DISCONTINUED | OUTPATIENT
Start: 2019-03-02 | End: 2019-03-03

## 2019-03-02 RX ORDER — GABAPENTIN 300 MG/1
600 CAPSULE ORAL 2 TIMES DAILY
Status: DISCONTINUED | OUTPATIENT
Start: 2019-03-02 | End: 2019-03-05 | Stop reason: HOSPADM

## 2019-03-02 RX ORDER — LABETALOL HYDROCHLORIDE 300 MG/1
150 TABLET, FILM COATED ORAL 2 TIMES DAILY
Status: DISCONTINUED | OUTPATIENT
Start: 2019-03-02 | End: 2019-03-05 | Stop reason: HOSPADM

## 2019-03-02 RX ORDER — LACTOBACILLUS RHAMNOSUS GG 10B CELL
1 CAPSULE ORAL 2 TIMES DAILY
Status: DISCONTINUED | OUTPATIENT
Start: 2019-03-02 | End: 2019-03-05 | Stop reason: HOSPADM

## 2019-03-02 RX ORDER — LEVETIRACETAM 750 MG/1
750 TABLET ORAL 2 TIMES DAILY
Status: DISCONTINUED | OUTPATIENT
Start: 2019-03-02 | End: 2019-03-05 | Stop reason: HOSPADM

## 2019-03-02 RX ADMIN — CEFTRIAXONE SODIUM 2 G: 2 INJECTION, POWDER, FOR SOLUTION INTRAMUSCULAR; INTRAVENOUS at 21:04

## 2019-03-02 RX ADMIN — INSULIN GLARGINE 12 UNITS: 100 INJECTION, SOLUTION SUBCUTANEOUS at 21:06

## 2019-03-02 RX ADMIN — GABAPENTIN 600 MG: 300 CAPSULE ORAL at 10:14

## 2019-03-02 RX ADMIN — PREDNISONE 5 MG: 5 TABLET ORAL at 08:15

## 2019-03-02 RX ADMIN — LEVOTHYROXINE SODIUM 50 MCG: 25 TABLET ORAL at 08:15

## 2019-03-02 RX ADMIN — Medication 150 MG: at 21:04

## 2019-03-02 RX ADMIN — LEVETIRACETAM 500 MG: 5 INJECTION INTRAVENOUS at 03:39

## 2019-03-02 RX ADMIN — HEPARIN SODIUM 5000 UNITS: 5000 INJECTION, SOLUTION INTRAVENOUS; SUBCUTANEOUS at 11:54

## 2019-03-02 RX ADMIN — Medication 1 CAPSULE: at 21:05

## 2019-03-02 RX ADMIN — Medication 1 CAPSULE: at 10:14

## 2019-03-02 RX ADMIN — LORATADINE 10 MG: 10 TABLET ORAL at 08:15

## 2019-03-02 RX ADMIN — SODIUM BICARBONATE: 84 INJECTION, SOLUTION INTRAVENOUS at 05:13

## 2019-03-02 RX ADMIN — SODIUM CHLORIDE: 9 INJECTION, SOLUTION INTRAVENOUS at 05:38

## 2019-03-02 RX ADMIN — GABAPENTIN 600 MG: 300 CAPSULE ORAL at 21:04

## 2019-03-02 RX ADMIN — HEPARIN SODIUM 5000 UNITS: 5000 INJECTION, SOLUTION INTRAVENOUS; SUBCUTANEOUS at 23:48

## 2019-03-02 RX ADMIN — LEVETIRACETAM 750 MG: 750 TABLET, FILM COATED ORAL at 21:05

## 2019-03-02 RX ADMIN — DIPHENOXYLATE HYDROCHLORIDE AND ATROPINE SULFATE 1 TABLET: 2.5; .025 TABLET ORAL at 18:03

## 2019-03-02 RX ADMIN — Medication 150 MG: at 11:54

## 2019-03-02 ASSESSMENT — ACTIVITIES OF DAILY LIVING (ADL)
ADLS_ACUITY_SCORE: 39
ADLS_ACUITY_SCORE: 40
ADLS_ACUITY_SCORE: 37
ADLS_ACUITY_SCORE: 37

## 2019-03-02 NOTE — PLAN OF CARE
S:  Improved neuro status.  B:  Pt initially did not speak to staff but would nod yes/no to questions and seemed appropriate.  By midmorning pt speaking and was oriented x 4 but w/ dec STM.  Midmorning she indicated to staff neprhologist that she did not want to go to her home at time of discharge as she felt the care at home was causing some of her problems.  Later in shift when asked about this, she indicated that this was not her wish, that she was still 'mixed up then'.  She would like to return home if possible at time of discharge.          She has been afebrile, denied nausea or pain.  OOB to chair via lift for lunch tolerating well. Ate a small lunch reporting that she is 'just not hungry.           She has had 2 large diarreha stools ---- MD notified that pt reports she takes lomotil at home for diarrhea so MD will order this.  A:  As noted.  R:  Cont w/ care plan, transfer to 6B when bed available.

## 2019-03-02 NOTE — PROGRESS NOTES
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  Tac level in process today.    # Sepsis bacteremia UTI  - Urine culture likely Ecoli, Blood c/s   - abx per primar team    # tacrolimus toxicity: she received IV tacrolimus and accidentally overdose with 250cc bolus which equal to 15mg po tacrolimus, tacrolimus level still pending, keep checking neuro exam q4hr, monitor for seizure and press,, monitor for type4 RTA and hyperkalemia, monitor for renal failure, check EKG for qtc prolongation.   -- Check level today, if less than 8, will restart tacrolimus at 2 mg bid    # Immunosuppression: Tacrolimus immediate release (goal  4-6) and Prednisone (dose  5 mg daily)   - Changes: No, hold tacrolimus now and await level.     # Hypertension/Volume Status: Inadequate control; Goal BP: < 140/90   - Changes: No    - Continue to increase hydralazine as tolerated and will plan to restart losartan when creatinine under 2    # Diabetes: Controlled    # Anemia in chronic renal disease: Hgb: Stable   - Iron studies: Not checked recently    # Mineral Bone Disorder:    - Secondary renal hyperparathyroidism; PTH level is: Not checked recently  - Vitamin D; level is: Not checked recently  - Calcium; level is: Normal  - Phosphorus; level is: Normal    # Electrolytes:   - Potassium; level: Normal, hyperkalemia overnight likley RTA tacrolimus overdose, now improved.   - Magnesium; level: Normal  - Bicarbonate; level: Low nongap acidosis could be RTA vs  "hyperchloremic acidosis, if mentation is not improving then repeat VBG because she might need correction of her metabolic acidosis with bicarb contain solution     # Concern for safety at home - the patient is emotional today and notes concerns about safety at home.  I recommend social work consultation.    # Transplant History:  Etiology of kidney failure: diabetic nephropathy  Tx: LDKT  Transplant: 10/27/1999 (Kidney)  Donor Type: Living Donor Class:   Significant changes in immunosuppression: None  Significant transplant-related complications: recurrent UTIs    Recommendations were communicated to the primary team via this note.    Seen and discussed with Dr. Ley     Viral Eduar Ley MD  Pager: [phone]    Interval History    Patient feels better today, no chest pain.  Overnight no major issues.    Review of Systems   4 point ROS was obtained and negative except as noted in the interval history    Physical Exam   Temp  Av.8  F (37.1  C)  Min: 96.8  F (36  C)  Max: 101.4  F (38.6  C)      Pulse  Av.5  Min: 67  Max: 88 Resp  Av  Min: 8  Max: 20  SpO2  Av.6 %  Min: 83 %  Max: 100 %     /75   Pulse 77   Temp 98  F (36.7  C) (Axillary)   Resp 20   Ht 1.753 m (5' 9\")   Wt 112.5 kg (248 lb 0.3 oz)   SpO2 97%   BMI 36.63 kg/m     Date 19 0700 - 19 0659   Shift 6944-6169 2191-7107 7020-2561 24 Hour Total   INTAKE   I.V. 200   200   Shift Total(mL/kg) 200(1.78)   200(1.78)   OUTPUT   Urine 300   300   Shift Total(mL/kg) 300(2.67)   300(2.67)   Weight (kg) 112.5 112.5 112.5 112.5      Admit Weight: 108.9 kg (240 lb 1.6 oz)   GENERAL APPEARANCE: alert and no distress, emotional labile  HENT: mouth without ulcers or lesions  LYMPHATICS: no cervical or supraclavicular nodes  RESP: lungs clear to auscultation - no rales, rhonchi or wheezes  CV: regular rhythm, normal rate, no rub, no murmur  EDEMA: no LE edema bilaterally  ABDOMEN: soft, nondistended, nontender, bowel sounds " normal  MS: extremities normal - no gross deformities noted, no evidence of inflammation in joints, no muscle tenderness  SKIN: no rash    Data   All labs reviewed by me.  CMP  Recent Labs   Lab 03/02/19  0804 03/02/19 0330 03/01/19 2357 03/01/19 2119 02/28/19 0035    146* 144 143   < > 135   POTASSIUM 5.2 4.4 4.5 4.5   < > 6.3*  6.4*   CHLORIDE 112* 113* 113* 112*   < > 108   CO2 24 21 24 22   < > 23   ANIONGAP 8 12 7 10   < > 5   GLC 82 108* 132* 166*   < > 90   BUN 48* 50* 54* 50*   < > 70*   CR 2.10* 2.25* 2.17* 2.22*   < > 2.27*   GFRESTIMATED 23* 21* 22* 21*   < > 21*   GFRESTBLACK 27* 24* 26* 25*   < > 24*   ROGER 8.2* 8.4* 8.0* 8.4*   < > 8.6   MAG 2.4* 2.3 2.4* 2.4*   < >  --    PHOS 3.6 3.5 3.3 3.6   < >  --    PROTTOTAL  --   --   --   --   --  7.8   ALBUMIN  --   --   --   --   --  3.2*   BILITOTAL  --   --   --   --   --  0.4   ALKPHOS  --   --   --   --   --  103   AST  --   --   --   --   --  31   ALT  --   --   --   --   --  31    < > = values in this interval not displayed.     CBC  Recent Labs   Lab 03/02/19 0330 03/01/19  0545 02/28/19  1041 02/28/19  0612 02/28/19  0038 02/28/19 0035   HGB 8.8* 9.4*  --  9.8* 11.6* 10.7*   WBC 7.1 10.9  --  12.8*  --  11.4*   RBC 2.97* 3.21*  --  3.35*  --  3.70*   HCT 28.9* 31.6*  --  33.0*  --  36.5   MCV 97 98  --  99  --  99   MCH 29.6 29.3  --  29.3  --  28.9   MCHC 30.4* 29.7*  --  29.7*  --  29.3*   RDW 14.6 14.3  --  14.0  --  13.9   * 104* 100* 109*  --  101*     INR  Recent Labs   Lab 02/28/19  0035   INR 1.09     ABG  Recent Labs   Lab 02/28/19  0612 02/28/19  0336   O2PER 96.0 95.0      Urine Studies  Recent Labs   Lab Test 02/28/19  2200 02/28/19  0215 02/27/19  1900 01/24/19 2010 01/11/18  1614 12/19/17  1620 11/13/17  1646   COLOR Light Yellow Yellow Yellow Light Yellow   < > Yellow Pink Yellow   APPEARANCE Slightly Cloudy Cloudy Slightly Cloudy Cloudy   < > Turbid Slightly Cloudy Turbid   URINEGLC 70* Negative Negative 30*   <  > 100* 100* 500*   URINEBILI Negative Negative Negative Negative   < > Negative Negative Negative   URINEKETONE Negative Negative Negative Negative   < > Negative Negative Negative   SG 1.009 1.015 1.010 1.012   < > 1.025 1.015 1.015   UBLD Moderate* Large* Large* Moderate*   < > Large* Large* Moderate*   URINEPH 5.0 5.5 6.0 5.5   < > 6.0 6.5 6.5   PROTEIN 30* 100* 100* 100*   < > >=300* 100* 100*   UROBILINOGEN  --   --   --   --   --  0.2 0.2 0.2   NITRITE Negative Positive* Negative Negative   < > Positive* Negative Negative   LEUKEST Large* Large* Large* Large*   < > Large* Large* Small*   RBCU 31 64* 159* 93*   < > 5-10* 25-50* 25-50*   WBCU >182* 1,760* 139* >182*   < > >100* * >100*    < > = values in this interval not displayed.     Recent Labs   Lab Test 10/10/18  1503 09/17/18  2040 04/24/17  1930 10/07/11  1230 09/27/11  1600   UTPG 5.27* 2.96* 15.28* Test canceled by PCU/Clinic  Charge credited PER RADHA FALCON 0.07     PTH  Recent Labs   Lab Test 09/18/18  0627 03/11/17  0859 02/16/17  2303 10/07/11  1215 09/27/11  1600   PTHI 104* 334* 9* Test canceled by PCU/Clinic  Charge credited PER RADHA FALCON 53     Iron Studies  Recent Labs   Lab Test 02/17/17  2358 09/04/16  0815 08/07/16  0750 08/09/12  0819 07/14/12  0720 05/08/12 10/07/11  1215 09/27/11  1600   IRON 50 124 12* 68 154 68 Test canceled by PCU/Clinic  Charge credited CANCELED PER RADHA FALCON CORRECTED ON 10/07 AT 1459: PREVIOUSLY REPORTED AS 69 26*   * 128* 166* 188* 169*  --  Test canceled by PCU/Clinic  Charge credited CANCELED PER RADHA FALCON CORRECTED ON 10/07 AT 1459: PREVIOUSLY REPORTED  227*   IRONSAT 29 97* 7* 36 91*  --  Test canceled by PCU/Clinic  Charge credited CANCELED PER RADHA FALCON CORRECTED ON 10/07 AT 1459: PREVIOUSLY REPORTED AS 30 12*   GIANA 1,069* 2,676* 645*  --  444* 630?  --  72       IMAGING:  All imaging studies reviewed by me.     MEDICATIONS:  Current Facility-Administered Medications    Medication     acetaminophen (TYLENOL) Suppository 650 mg     baclofen (LIORESAL) half-tab 5 mg     calcium gluconate 10 % injection 2 g     cefTRIAXone (ROCEPHIN) 2 g vial to attach to  ml bag for ADULTS or NS 50 ml bag for PEDS     glucose gel 15-30 g    Or     dextrose 50 % injection 25-50 mL    Or     glucagon injection 1 mg     glucose gel 15-30 g    Or     dextrose 50 % injection 25-50 mL    Or     glucagon injection 1 mg     heparin sodium injection 5,000 Units     hydrALAZINE (APRESOLINE) injection 10 mg     insulin aspart (NovoLOG) inj (RAPID ACTING)     insulin glargine (LANTUS PEN) injection 12 Units     levETIRAcetam (KEPPRA) intermittent infusion 500 mg     levothyroxine (SYNTHROID/LEVOTHROID) tablet 50 mcg     loratadine (CLARITIN) tablet 10 mg     melatonin tablet 1 mg     naloxone (NARCAN) injection 0.1-0.4 mg     naloxone (NARCAN) injection 0.4 mg     predniSONE (DELTASONE) tablet 5 mg     sodium bicarbonate 150 mEq in 5% dextrose for infusion

## 2019-03-02 NOTE — PLAN OF CARE
Shift Summary    Been AAOx4 though forgetful for most of this shift, neuro check assessed findings WNL, moves all extremities though BLE weaker than BUE. Was getting slightly irritable late in the shift; reassurance and comfort provided. Vital signs stable and within goal. Been on NSR; slightly prolonged QT interval noted at the beginning of the shift but was also known to primary team and documented via previous 12-lead ECG. Been breathing regular, unlabored, and with equal chest expansion; been wearing her nasal CPAP overnight; placed on oxygen supplement for a dropped in SpO2 to 87% relatively early in the shift but has been satting high 90s since then. Regularly ordered labs drawn from [facility] (NaHCO3- infusion site) and results known to on-call MD (pager #[phone]). Kept NPO even though pt's passed the Dysphagia Screen; primary team made aware of this this morning. Johnson catheter maintained to gravity drainage with urinary output slightly less than the amount of fluid is getting.

## 2019-03-02 NOTE — PLAN OF CARE
Patient appears to be improving since around 1400 this afternoon. Patient is no longer belligerent, nor refusing cares since this afternoon.    Neuro: A/o x4 with improving memory recall, patient c/o baseline numbness and tingling to extremities. Pupils intact at 2mm and briskly reactive to light.  CV: NSR with rare PVC and peaked T-wave - see last charted potassium level for value. BP stable at 130-150 systolic range.  Pulm: Lung sounds clear and on 2L NC.  GI: Bowel sounds positive. Patient passed bedside swallow eval and appears appropriate for regular diet.  : Johnson catheter with stable urine output at /hr.  IV/Gtt: Bilateral PIV's with NS at 125 and D5 1/2 with bicarbonate at 50.  Psych: Patient appears tearful but cooperative this afternoon. Restraints are currently off.    Will continue to monitor for safety and comfort.    Gabriel Beaulieu RN

## 2019-03-02 NOTE — DISCHARGE SUMMARY
[facility], [facility] Discharge Summary       Date of Admission:  2/28/2019  Date of Discharge:  3/5/2019  2:42 PM  Date of Service: 3/5/2019  Discharging Attending Provider: Le Chen MD  Discharge Team: [facility]    Discharge Diagnoses      Severe sepsis (H)  Acute renal failure, unspecified acute renal failure type (H)  Hyperkalemia  Urinary tract infection associated with indwelling urethral catheter, initial encounter (H)  Acute kidney injury (H)  Kidney replaced by transplant  Diarrhea, unspecified type  Benign essential hypertension  Complicated UTI (urinary tract infection)   Pyelonephritis of transplant kidney     Follow-ups Needed After Discharge   PCP within 7 days for BMP, CBC, and tacrolimus level     Hospital Course   Gem Jade was admitted on 2/28/2019 for sepsis secondary to e coli UTI and bacteremia.  The following problems were addressed during her hospitalization:    #Acute encephalopathy likely due to sepsis   #Severe Sepsis likely due to urinary tract infection  #Pyelonephritis of transplant kidney   Febrile in ED with mild leukocytosis, normal lactate. Procal was moderate. Had AMS which has slowly improved with treatment of sepsis and now is A/O x 3 but confused at times. Has a history of recurrent infections with indwelling rico which was changed in the ED. UC/BC both growing pan sensitive E coli. Transplant ID consulted and recommended ceftriaxone. Transition to ciprofloxacin for a total of 14 days at discharge after initial treatment with vanc and zosyn. She will start bactrim 80/400 mg daily for prophylaxis once finished with cipro course for prophylaxis for future UTIs. CT abd/pelvis ordered on day of discharge to rule out nephrolithiasis or abscess. Showed enlargement of right renal mass seen before (Increased size of the exophytic mildly hyperdense nodule along the anterior cortex of the right native  kidney, now measuring 18 mm. Previously this measured 13 mm on 1/18/2018 and 10 mm on 2 2/7/2017). Radiology recommended further imaging. Patient bed bound for last 8 years and unable to make it to any outpatient appt.  is caregiver. Patient and  aware of mass. Will try to get [facility] involved to see if home visits would be possible.      #Tacrolimus overdose  #Hyperkalemia  # LDKT (1999)  Patient received IV tacrolimus 250 ml as bolus, equal to 15 mg PO on 2/28. Home dose is 2 mg BID. Level was elevated on day 1 at 25.3. Had altered mental status but improved and likely related to improving sepsis. 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 Recieved NaHCO3 drip for 48 hrs. EKG showed stable qtc. 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 BG was initially elevated but improved after 24 hours.      #Type II DM  Home regimen is novolog TID with meals and lantus 24 units nightly. She was put on lantus 12U and increased to HSSI while here due to elevated BG after tacro overdose. Eventually after improved mental status and dietary intake she was increased to 18U and discharged back on her home regimen.      #Hypertension   #paroxysymal A fib  Continue to hold losartan due to worsening renal function. She was resumed on home labetalol and pravastatin. Started amlodopine due to elevated blood pressures on 3/4 after discussing with renal.     #Hypothyroidism  -continued home synthroid     #Neuropathy  -PTA gabapentin  -PTA baclofen    # Discharge Pain Plan:   - Patient currently has NO PAIN and is not being prescribed pain medications on discharge.    Consultations This Hospital Stay   PHARMACY TO DOSE VANCO  PHARMACY TO DOSE VANCO  NEPHROLOGY KIDNEY/PANCREAS TRANSPLANT ADULT IP CONSULT  PHARMACY IP CONSULT  VASCULAR  ACCESS CARE ADULT IP CONSULT  INFECTIOUS DISEASE TRANSPLANT SOT ADULT IP CONSULT  PHARMACY IP CONSULT  SOCIAL WORK IP CONSULT  VASCULAR ACCESS CARE ADULT IP CONSULT    Code Status   Full Code       The patient was discussed with Dr. Gustavo [facility] Inpatient Service  [facility]   Pager: [phone]  ______________________________________________________________________  Review of Systems   Constitutional: Negative for chills and fever.   Respiratory: Negative for shortness of breath.    Cardiovascular: Negative for chest pain.   Gastrointestinal: Negative for abdominal pain, nausea and vomiting.   Genitourinary: Negative for dysuria, flank pain and frequency.   Psychiatric/Behavioral: Negative for confusion.     Physical Exam   Vital Signs: Temp: 98  F (36.7  C) Temp src: Oral BP: 166/58 Pulse: 70 Heart Rate: 68 Resp: 16 SpO2: 100 % O2 Device: BiPAP/CPAP Oxygen Delivery: 4 LPM  Weight: 249 lbs 1.92 oz    Physical Exam   Constitutional: She is oriented to person, place, and time.   Bed bound    HENT:   Head: Normocephalic and atraumatic.   Cardiovascular: Normal rate, regular rhythm and normal heart sounds. Exam reveals no gallop and no friction rub.   No murmur heard.  Pulmonary/Chest: Effort normal and breath sounds normal. No respiratory distress. She has no wheezes. She has no rales.   Neurological: She is alert and oriented to person, place, and time.   Skin: Skin is warm and dry.   Psychiatric: She has a normal mood and affect.         Significant Results and Procedures   Most Recent 3 CBC's:  Recent Labs   Lab Test 03/05/19  0558 03/04/19  0432 03/03/19  0526   WBC 4.3 5.1 6.3   HGB 9.0* 8.7* 9.0*   MCV 98 99 97   * 119* 114*     Most Recent 3 BMP's:  Recent Labs   Lab Test 03/05/19  0558 03/04/19  0432 03/03/19  2103    141 139   POTASSIUM 4.3 4.2 4.5   CHLORIDE 107 107 107   CO2 24 25 23   BUN 34* 39* 39*   CR 1.81* 1.93* 1.97*   ANIONGAP 7 9 8   ROGER  8.2* 8.4* 8.4*   * 220* 195*     Most Recent 2 LFT's:  Recent Labs   Lab Test 02/28/19  0035 09/17/18  1251   AST 31 6   ALT 31 9   ALKPHOS 103 54   BILITOTAL 0.4 0.2     Most Recent 3 INR's:  Recent Labs   Lab Test 02/28/19  0035 11/06/17  1652 04/26/17  2338   INR 1.09 1.09 1.03       Pending Results   These results will be followed up by NP  Unresulted Labs Ordered in the Past 30 Days of this Admission     Date and Time Order Name Status Description    2/28/2019 2200 Blood culture Preliminary     2/28/2019 2200 Blood culture Preliminary     2/28/2019 0028 ISTAT gases elec ica gluc galindo POCT In process     2/28/2019 0024 Blood culture Preliminary              Primary Care Physician   Marivel Barcenas NP    Discharge Disposition   Discharged to home  Condition at discharge: Stable    Discharge Orders      Home care nursing referral      Medication Therapy Management Referral      Reason for your hospital stay    You were hospitalized due to a urinary tract/kidney infection that caused sepsis.     Adult [facility] Follow-up and recommended labs and tests    Follow up with primary care provider, Marivel Barcenas NP, within 7 days for hospital follow- up.  The following labs/tests are recommended: BMP, CBC, tacrolimus level.      Appointments on [place] and/or [facility] (with [facility] or [facility] provider or service). Call [phone] if you haven't heard regarding these appointments within 7 days of discharge.     Activity    Your activity upon discharge: activity as tolerated     When to contact your care team    Call your primary doctor if you have any of the following: temperature greater than 100.4,  increased shortness of breath or increased pain.     Full Code     Diet    Follow this diet upon discharge:       Regular Diet Adult     Discharge Medications   Discharge Medication List as of 3/5/2019  1:24 PM      START taking these medications    Details   amLODIPine (NORVASC) 5 MG tablet  Take 1 tablet (5 mg) by mouth daily, Disp-60 tablet, R-0, E-Prescribe      ciprofloxacin (CIPRO) 500 MG tablet Take 1 tablet (500 mg) by mouth 2 times daily for 10 days, Disp-20 tablet, R-0, E-Prescribe         CONTINUE these medications which have CHANGED    Details   diphenoxylate-atropine (LOMOTIL) 2.5-0.025 MG tablet Take 1-2 tablets by mouth 4 times daily as needed for diarrhea, Disp-90 tablet, R-0, Local Print         CONTINUE these medications which have NOT CHANGED    Details   ACETAMINOPHEN PO Take 325-650 mg by mouth every 4 hours as needed., Historical      aspirin 81 MG tablet Take 81 mg by mouth daily, Historical      baclofen (LIORESAL) 10 MG tablet Take 1 tablet (10 mg) by mouth 2 times daily as needed for other (Bladder Spasms), Disp-60 tablet, R-3, E-Prescribe      blood glucose (ACCU-CHEK COMPACT PLUS) test strip USE TO TEST BLOOD SUGAR THREE TIMES A DAY, Disp-102 each, R-11, E-Prescribe      Cranberry 400 MG TABS Take 400 mg by mouth 2 times daily, Historical      cyanocobalamin (VITAMIN B12) 1000 MCG/ML injection Inject 1 mL (1,000 mcg) into the muscle every 30 days, Disp-3 mL, R-3, E-Prescribe      famotidine (PEPCID) 20 MG tablet Take 1 tablet (20 mg) by mouth 2 times daily, Disp-60 tablet, R-1, Historical      gabapentin (NEURONTIN) 300 MG capsule Take 2 capsules (600 mg) by mouth 2 times daily May take an additional 300mg at bedtime daily with increase pain, Disp-360 capsule, R-3, E-Prescribe      insulin glargine (LANTUS SOLOSTAR PEN) 100 UNIT/ML pen Inject 24 Units Subcutaneous At Bedtime, Disp-45 mL, R-3, Historical      insulin pen needle (COMFORT EZ PEN NEEDLES) 31G X 6 MM Use 4 pen needles daily or as directed.Disp-100 each, [id number]      labetalol (NORMODYNE) 300 MG tablet Take 0.5 tablets (150 mg) by mouth 2 times daily, Disp-180 tablet, R-3, Historical      Lactobacillus Rhamnosus, GG, (CULTURELL) capsule Take 1 capsule by mouth 2 times daily, Disp-60 capsule, R-11, Local Print  "     levETIRAcetam (KEPPRA) 750 MG tablet Take 1 tablet (750 mg) by mouth 2 times daily, Disp-60 tablet, R-11, E-Prescribe      levothyroxine (SYNTHROID/LEVOTHROID) 50 MCG tablet TAKE ONE TABLET BY MOUTH EVERY DAY, Disp-90 tablet, R-1, E-Prescribe      lidocaine (XYLOCAINE) 2 % topical gel Apply topically as needed for moderate pain 10 ML every 3 weeksDisp-30 mL, [id number]      loratadine (CLARITIN) 10 MG tablet Take 1 tablet (10 mg) by mouth daily, Disp-90 tablet, R-3, E-Prescribe      losartan (COZAAR) 25 MG tablet Take 1 tablet (25 mg) by mouth daily, Disp-30 tablet, R-3, Historical      pravastatin (PRAVACHOL) 10 MG tablet Take 1 tablet (10 mg) by mouth daily, Disp-90 tablet, R-3, E-Prescribe      predniSONE (DELTASONE) 5 MG tablet Take 1 tablet (5 mg) by mouth daily, Disp-90 tablet, R-3, E-Prescribe      tacrolimus (GENERIC EQUIVALENT) 1 MG capsule Take 2 capsules (2 mg) by mouth 2 times daily, Disp-120 capsule, R-11, E-Prescribe      VITAMIN D, CHOLECALCIFEROL, PO Take 4,000 Units by mouth 2 times daily, Historical      hydrocortisone 1%/eucerin 1% compounded cream Apply topically 2 times dailyDisp-30 g, R-1Local Print      insulin aspart (NOVOLOG FLEXPEN) 100 UNIT/ML pen Take 10 units with meals twice daily with sliding scale. Max 40 units per day  Sliding scale:   140-239=2 units  240-339=4 units  340-439=6 units  >440=8 units, Disp-45 mL, R-3, Historical      ondansetron (ZOFRAN ODT) 4 MG ODT tab Take 1 tablet (4 mg) by mouth daily as needed for nausea 30 minutes before getting up in your wheelchair, Disp-20 tablet, R-1, E-Prescribe      !! order for DME Equipment being ordered: Tegaderm 4x4   MICHELLE 6 months  Sacral woundDisp-1 Box, R-3, Local Print      !! order for DME Equipment being ordered: [employer] gel-T cushion 20 inches wide & 18 inches deep    height 1.676 m (5' 6\"), weight 101.6 kg (223 lb 15.8 oz)Disp-1 each, R-0, Local Print      simethicone (MYLICON) 125 MG CHEW Take 1 tablet (125 mg) " by mouth as needed for intestinal gas, Disp-120 tablet, Historical       !! - Potential duplicate medications found. Please discuss with provider.        Allergies   Allergies   Allergen Reactions     Atorvastatin Calcium      myalgias, muscle aches     Codeine Nausea and Vomiting     Darvocet [Propoxyphene N-Apap] Nausea and Vomiting     Hydromorphone      Levofloxacin Other (See Comments) and Diarrhea     hallucinations     Morphine      Nausea and vomiting     Niaspan [Niacin] GI Disturbance     Flushing even at low dose, GI upset     Percocet [Oxycodone-Acetaminophen]      Vomiting after taking med couple of times     Vicodin [Hydrocodone-Acetaminophen] Nausea and Vomiting

## 2019-03-02 NOTE — PROGRESS NOTES
[facility], [facility] Progress Note - [place]       Main Plans for Today   Continue ceftriaxone  Discontinue IVF  Discontinue NaHCO3 (per nephrology recs)  Follow neuro exam   Q12H BMPs   Regular diet   Transplant Nephrology Consulted  Transplant ID consulted       Assessment & Plan   Gem Jade is a 73 year old female admitted on 2/28/2019. She has a history of DM2, polyneuropathy, kidney transplant (1999) and recurrent UTIs secondary to indwelling rico complicated by toxic encephalopathy and is admitted for sepsis likely secondary to a urinary source and toxic metabolic encephalopathy.    #Acute encephalopathy likely due to sepsis   #Severe Sepsis likely due to urinary tract infection  Febrile in ED with mild leukocytosis, normal lactate. Procal was moderate. Had AMS which has slowly improved with treatment of sepsis and now is A/O x 3 but confused at times. Has a history of recurrent infections with indwelling rico which was changed in the ED. UC/BC both growing pan sensitive E coli.    -Continue ceftriaxone   -Discontinue IVF   -Regular diet     #Tacrolimus overdose  #Hyperkalemia  # LDKT (1999)   Patient received IV tacrolimus 250 ml as bolus, equal to 15 mg PO. Home dose is 2 mg BID. Level was elevated on day 1 at 25.3. Has altered mental status but improving and likely related to improving sepsis. Also had hyperkalemia treated with lasix and insulin which has remained stable. 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 EKG has showed stable qtc. Cr has now stabilized and continues to have urine output.   - hold tacrolimus   - daily tacrolimus level   - bicarb complete   - Q12H BMP   - Q4H neuro checks   - continue to trend BG and HSSI   - appreciate assistance of pharmacy and transplant nephrology      #Type II DM  Home regimen is novolog TID with meals and lantus 24 units nightly  -Lantus 12 U at bedtime   -HSSI  -Glucose checks     #Hypertension   #paroxysymal A  fib  Continue to hold losartan.   -As needed hydralazine IV    -Resume pravastatin  -Resume labetalol     #Hypothyroidism  -Resume synthroid     #Neuropathy  -PTA gabapentin  -PTA baclofen    # Chest pain   Patient complained of chest pain on 3/1 am. Unable to obtain further history at that time due to AMS. Hemodynamically stable. EKG was unremarkable for ischemic changes. Troponin also negative. Low concern for cardiac etiology and chest pain is now resolved.     # Pain Assessment:  Current Pain Score 3/2/2019   Patient currently in pain? denies   Pain score (0-10) -   Pain location -   Pain descriptors -   CPOT pain score -   Gem swan pain level was assessed and she currently denies pain.      Diet: Regular Diet Adult  Fluids:  ml/hr   DVT Prophylaxis: Heparin SQ  Code Status: Full Code    Disposition Plan   Expected discharge: 4 - 7 days, recommended to prior living arrangement once antibiotic plan established and SIRS/Sepsis treated. Dispo:          Entered: Gregg Cevallos 03/02/2019, 10:42 AM   Information in the above section will display in the discharge planner report.      The patient's care was discussed with the Attending Physician, Dr. Manzanares.    Gregg Cevallos  [facility]  Pager: [phone]  Please see sticky note for cross cover information    Interval History   Denies pain or new complaints this morning. Initially providing some verbal answers but then only nodding yes and no to questions. Mental status improving slowly.   Further history or ROS is limited by patient participation.     Physical Exam   Vital Signs: Temp: 98  F (36.7  C) Temp src: Axillary BP: (!) 171/91 Pulse: 77 Heart Rate: 84 Resp: 17 SpO2: 97 % O2 Device: BiPAP/CPAP Oxygen Delivery: 2 LPM  Weight: 248 lbs .28 oz  General Appearance: Sleepy, arouses to voice, no distress, comfortable, oriented x 3  Respiratory: lungs clear to auscultation without rales, rhonchi, wheeze, no  respiratory distress   Cardiovascular: regular rate and rhythm, no murmur, no nuno, no rub, mild chest wall tenderness   GI: obese, soft, non-distended, no guarding, no rebound, non-tender   Skin: warm, dry   Other: Patient awake, alert, and oriented x3 on formal testing but somewhat confused, then later only responding with head nodding       Data

## 2019-03-03 PROBLEM — T50.901D: Status: ACTIVE | Noted: 2019-03-03

## 2019-03-03 LAB
ANION GAP SERPL CALCULATED.3IONS-SCNC: 7 MMOL/L (ref 3–14)
ANION GAP SERPL CALCULATED.3IONS-SCNC: 8 MMOL/L (ref 3–14)
BUN SERPL-MCNC: 39 MG/DL (ref 7–30)
BUN SERPL-MCNC: 42 MG/DL (ref 7–30)
CALCIUM SERPL-MCNC: 8.2 MG/DL (ref 8.5–10.1)
CALCIUM SERPL-MCNC: 8.4 MG/DL (ref 8.5–10.1)
CHLORIDE SERPL-SCNC: 107 MMOL/L (ref 94–109)
CHLORIDE SERPL-SCNC: 110 MMOL/L (ref 94–109)
CO2 SERPL-SCNC: 23 MMOL/L (ref 20–32)
CO2 SERPL-SCNC: 24 MMOL/L (ref 20–32)
CREAT SERPL-MCNC: 1.97 MG/DL (ref 0.52–1.04)
CREAT SERPL-MCNC: 2.05 MG/DL (ref 0.52–1.04)
ERYTHROCYTE [DISTWIDTH] IN BLOOD BY AUTOMATED COUNT: 14.3 % (ref 10–15)
GFR SERPL CREATININE-BSD FRML MDRD: 24 ML/MIN/{1.73_M2}
GFR SERPL CREATININE-BSD FRML MDRD: 25 ML/MIN/{1.73_M2}
GLUCOSE BLDC GLUCOMTR-MCNC: 143 MG/DL (ref 70–99)
GLUCOSE BLDC GLUCOMTR-MCNC: 182 MG/DL (ref 70–99)
GLUCOSE BLDC GLUCOMTR-MCNC: 236 MG/DL (ref 70–99)
GLUCOSE BLDC GLUCOMTR-MCNC: 77 MG/DL (ref 70–99)
GLUCOSE BLDC GLUCOMTR-MCNC: 83 MG/DL (ref 70–99)
GLUCOSE SERPL-MCNC: 112 MG/DL (ref 70–99)
GLUCOSE SERPL-MCNC: 195 MG/DL (ref 70–99)
HCT VFR BLD AUTO: 29.8 % (ref 35–47)
HGB BLD-MCNC: 9 G/DL (ref 11.7–15.7)
MAGNESIUM SERPL-MCNC: 2.1 MG/DL (ref 1.6–2.3)
MAGNESIUM SERPL-MCNC: 2.2 MG/DL (ref 1.6–2.3)
MCH RBC QN AUTO: 29.4 PG (ref 26.5–33)
MCHC RBC AUTO-ENTMCNC: 30.2 G/DL (ref 31.5–36.5)
MCV RBC AUTO: 97 FL (ref 78–100)
PHOSPHATE SERPL-MCNC: 2.8 MG/DL (ref 2.5–4.5)
PHOSPHATE SERPL-MCNC: 2.9 MG/DL (ref 2.5–4.5)
PLATELET # BLD AUTO: 114 10E9/L (ref 150–450)
POTASSIUM SERPL-SCNC: 4.1 MMOL/L (ref 3.4–5.3)
POTASSIUM SERPL-SCNC: 4.5 MMOL/L (ref 3.4–5.3)
RBC # BLD AUTO: 3.06 10E12/L (ref 3.8–5.2)
SODIUM SERPL-SCNC: 139 MMOL/L (ref 133–144)
SODIUM SERPL-SCNC: 141 MMOL/L (ref 133–144)
TACROLIMUS BLD-MCNC: 7.9 UG/L (ref 5–15)
TME LAST DOSE: NORMAL H
WBC # BLD AUTO: 6.3 10E9/L (ref 4–11)

## 2019-03-03 PROCEDURE — 80048 BASIC METABOLIC PNL TOTAL CA: CPT | Performed by: FAMILY MEDICINE

## 2019-03-03 PROCEDURE — 25000132 ZZH RX MED GY IP 250 OP 250 PS 637: Mod: GY | Performed by: STUDENT IN AN ORGANIZED HEALTH CARE EDUCATION/TRAINING PROGRAM

## 2019-03-03 PROCEDURE — 25000131 ZZH RX MED GY IP 250 OP 636 PS 637: Mod: GY | Performed by: INTERNAL MEDICINE

## 2019-03-03 PROCEDURE — 25000132 ZZH RX MED GY IP 250 OP 250 PS 637: Mod: GY | Performed by: FAMILY MEDICINE

## 2019-03-03 PROCEDURE — 25000128 H RX IP 250 OP 636: Performed by: FAMILY MEDICINE

## 2019-03-03 PROCEDURE — A9270 NON-COVERED ITEM OR SERVICE: HCPCS | Mod: GY | Performed by: FAMILY MEDICINE

## 2019-03-03 PROCEDURE — 80197 ASSAY OF TACROLIMUS: CPT | Performed by: FAMILY MEDICINE

## 2019-03-03 PROCEDURE — 36415 COLL VENOUS BLD VENIPUNCTURE: CPT | Performed by: FAMILY MEDICINE

## 2019-03-03 PROCEDURE — 00000146 ZZHCL STATISTIC GLUCOSE BY METER IP

## 2019-03-03 PROCEDURE — 25000125 ZZHC RX 250: Performed by: FAMILY MEDICINE

## 2019-03-03 PROCEDURE — 84100 ASSAY OF PHOSPHORUS: CPT | Performed by: FAMILY MEDICINE

## 2019-03-03 PROCEDURE — 83735 ASSAY OF MAGNESIUM: CPT | Performed by: FAMILY MEDICINE

## 2019-03-03 PROCEDURE — A9270 NON-COVERED ITEM OR SERVICE: HCPCS | Mod: GY | Performed by: STUDENT IN AN ORGANIZED HEALTH CARE EDUCATION/TRAINING PROGRAM

## 2019-03-03 PROCEDURE — 25000125 ZZHC RX 250: Performed by: STUDENT IN AN ORGANIZED HEALTH CARE EDUCATION/TRAINING PROGRAM

## 2019-03-03 PROCEDURE — 85027 COMPLETE CBC AUTOMATED: CPT | Performed by: STUDENT IN AN ORGANIZED HEALTH CARE EDUCATION/TRAINING PROGRAM

## 2019-03-03 PROCEDURE — 12000004 ZZH R&B IMCU UMMC

## 2019-03-03 RX ORDER — DIPHENOXYLATE HCL/ATROPINE 2.5-.025MG
1 TABLET ORAL 4 TIMES DAILY PRN
Status: DISCONTINUED | OUTPATIENT
Start: 2019-03-03 | End: 2019-03-04

## 2019-03-03 RX ORDER — TACROLIMUS 1 MG/1
2 CAPSULE ORAL
Status: DISCONTINUED | OUTPATIENT
Start: 2019-03-03 | End: 2019-03-05 | Stop reason: HOSPADM

## 2019-03-03 RX ADMIN — Medication 150 MG: at 08:40

## 2019-03-03 RX ADMIN — CEFTRIAXONE SODIUM 2 G: 2 INJECTION, POWDER, FOR SOLUTION INTRAMUSCULAR; INTRAVENOUS at 19:54

## 2019-03-03 RX ADMIN — INSULIN GLARGINE 12 UNITS: 100 INJECTION, SOLUTION SUBCUTANEOUS at 22:30

## 2019-03-03 RX ADMIN — LEVETIRACETAM 750 MG: 750 TABLET, FILM COATED ORAL at 08:41

## 2019-03-03 RX ADMIN — PREDNISONE 5 MG: 5 TABLET ORAL at 08:41

## 2019-03-03 RX ADMIN — DIPHENOXYLATE HYDROCHLORIDE AND ATROPINE SULFATE 1 TABLET: 2.5; .025 TABLET ORAL at 22:43

## 2019-03-03 RX ADMIN — DIPHENOXYLATE HYDROCHLORIDE AND ATROPINE SULFATE 1 TABLET: 2.5; .025 TABLET ORAL at 16:20

## 2019-03-03 RX ADMIN — LEVETIRACETAM 750 MG: 750 TABLET, FILM COATED ORAL at 19:51

## 2019-03-03 RX ADMIN — Medication 1 CAPSULE: at 19:51

## 2019-03-03 RX ADMIN — TACROLIMUS 2 MG: 1 CAPSULE ORAL at 17:27

## 2019-03-03 RX ADMIN — GABAPENTIN 600 MG: 300 CAPSULE ORAL at 19:51

## 2019-03-03 RX ADMIN — LIDOCAINE HYDROCHLORIDE 10 ML: 20 JELLY TOPICAL at 22:44

## 2019-03-03 RX ADMIN — Medication 1 CAPSULE: at 08:41

## 2019-03-03 RX ADMIN — HEPARIN SODIUM 5000 UNITS: 5000 INJECTION, SOLUTION INTRAVENOUS; SUBCUTANEOUS at 12:05

## 2019-03-03 RX ADMIN — Medication 150 MG: at 19:51

## 2019-03-03 RX ADMIN — DIPHENOXYLATE HYDROCHLORIDE AND ATROPINE SULFATE 1 TABLET: 2.5; .025 TABLET ORAL at 02:29

## 2019-03-03 RX ADMIN — DIPHENOXYLATE HYDROCHLORIDE AND ATROPINE SULFATE 1 TABLET: 2.5; .025 TABLET ORAL at 09:58

## 2019-03-03 RX ADMIN — LEVOTHYROXINE SODIUM 50 MCG: 25 TABLET ORAL at 08:40

## 2019-03-03 RX ADMIN — LORATADINE 10 MG: 10 TABLET ORAL at 08:40

## 2019-03-03 RX ADMIN — GABAPENTIN 600 MG: 300 CAPSULE ORAL at 08:40

## 2019-03-03 ASSESSMENT — ACTIVITIES OF DAILY LIVING (ADL)
ADLS_ACUITY_SCORE: 27
ADLS_ACUITY_SCORE: 29
ADLS_ACUITY_SCORE: 27
ADLS_ACUITY_SCORE: 37
ADLS_ACUITY_SCORE: 27
ADLS_ACUITY_SCORE: 27

## 2019-03-03 ASSESSMENT — MIFFLIN-ST. JEOR: SCORE: 1704.38

## 2019-03-03 NOTE — PLAN OF CARE
Neuro: A&Ox4.   Cardiac: SR. VSS.   Respiratory: Sating 84% on RA, placed on 1 liter oxygen and maintained O2 sat above 90%. Lungs sounds diminished.  GI/: Adequate urine output via chronic rico. Rico leaking at 1700, catheter repositioned. BM X1, incontinent of stool. PRN lomotil given per patient request.   Diet/appetite: Tolerating regular diet. Eating well. Blood sugar checked ACHS.   Activity:  Assist of 2, turn and reposition.  Pain: At acceptable level on current regimen. Denies pain.   Skin: No new deficits noted. Mepilex in place on buttock. Interdry placed in abdominal/groin folds for right groin wound, skin kept clean and dry.   LDA's: PIV, chronic rico.     Plan: Continue with POC. Notify primary team with changes. Transfer orders in place.

## 2019-03-03 NOTE — PLAN OF CARE
Neuro: A&Ox4.   Cardiac: SR. VSS.   Respiratory: Sating 96% on 2L NC.  GI/: Adequate urine output.   Diet/appetite: Tolerating Regular diet. Eating well.  Activity:  LIFT, up to chair .  Pain: At acceptable level on current regimen.   Skin: Right groin skin tear  LDA's: Right Alex OTT (chronic)    Plan: Continue with POC. Notify primary team with changes.  Pt arrived to 6B at 1700

## 2019-03-03 NOTE — PROGRESS NOTES
Social Work Services Progress Note    Hospital Day: 4  Date of Initial Social Work Evaluation:  Not yet completed  Collaborated with:  patient    Data:  Pt is a 74yo female referred to social work for discharge planning.    Intervention:  Pt states that the previous day she had told the doctor that she did not feel safe going home and that she needed to go to a nursing home. However, she says that she was still under the influence of strong medication so was not thinking clearly. She also reports that part of this was likely due to an overdose of one of her medications (she was unsure of which one). Per review of notes, this was documented. An iCare was submitted due to the patient reporting it to this writer and it being unknown if the iCare had been previously submitted.    She states she does plan to go home after discharge and does not have any concerns. Pt also requested information about an HDA, which was verbally explained to her and provided to her. She states she has a friend who is a  and can notarize so she plans on completing the HDA after discharge then will bring it to a [place] location to have it submitted.    Assessment:  Pt to return home with previously received services.    Plan:    Anticipated Disposition:  Home with services    Barriers to d/c plan:  Medical readiness    Follow Up:  RNCC to follow for discharge with home care if necessary    GENE Campa, [facility] HospitalSW  Weekend Social Work  [facility], [place]    4A, 4C, 4E, 5A, 5B: [facility] [phone]  6A, 6B, 6C, 6D: [facility] [phone]  7A, 7B, 7C, 7D, 5C: [facility] [phone]

## 2019-03-03 NOTE — PROGRESS NOTES
[facility], [facility] [place] [facility] Progress Note - [place]       Main Plans for Today   Continue ceftriaxone 2g daily  Q12H BMP  Transplant Nephrology Consulted  Transplant ID consulted       Assessment & Plan   Gem Jade is a 73 year old female admitted on 2/28/2019. She has a history of DM2, polyneuropathy, kidney transplant (1999) and recurrent UTIs secondary to indwelling rico complicated by toxic encephalopathy and is admitted for sepsis secondary to UTI and toxic metabolic encephalopathy.    #Acute encephalopathy likely due to sepsis   #Severe Sepsis likely due to urinary tract infection  Febrile in ED with mild leukocytosis, normal lactate. Procal was moderate. Had AMS which has slowly improved with treatment of sepsis and now is A/O x 3. Has a history of recurrent infections with indwelling rico which was changed in the ED. UC/BCx both growing pan sensitive E coli.    -Continue ceftriaxone 2g daily. Transplant ID to assist with antibiotic plan.   -Regular diet     #Tacrolimus overdose  #Hyperkalemia  # LDKT (1999)   Patient received IV tacrolimus 250 ml as bolus, equal to 15 mg PO. Home dose is 2 mg BID. Tacrolimus level 25.3 (3/1)-->14.5 (3/2)-->3/3 pending.  Has altered mental status but improving and likely related to improving sepsis. Also had hyperkalemia treated with lasix and insulin which has remained stable. 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 EKG has showed stable qtc. Cr has now stabilized and continues to have urine output.   - hold tacrolimus   - daily tacrolimus level. Once level <8, ok to restart PTA tacrolimus 2mg BID  - bicarb complete   - Q12H BMP   - Q4H neuro checks   - continue to trend BG and HSSI   - appreciate assistance of pharmacy and transplant nephrology      #Type II DM  Home regimen is novolog TID with meals and lantus 24 units nightly  -Lantus 12 U at bedtime   -HSSI  -Glucose checks     #Hypertension   #paroxysymal A fib  Continue to hold  "losartan.   -As needed hydralazine IV    -Resume pravastatin  -Resume labetalol     #Hypothyroidism  -Resume synthroid     #Neuropathy  -PTA gabapentin  -PTA baclofen    # Chest pain-resolved  Patient complained of chest pain on 3/1 am. Unable to obtain further history at that time due to AMS. Hemodynamically stable. EKG was unremarkable for ischemic changes. Troponin also negative. Low concern for cardiac etiology and chest pain is now resolved.     # Pain Assessment:  Current Pain Score 3/3/2019   Patient currently in pain? denies   Pain score (0-10) -   Pain location -   Pain descriptors -   CPOT pain score -   Gem swan pain level was assessed and she currently denies pain.      Diet: Regular Diet Adult  Fluids: PO  DVT Prophylaxis: Heparin SQ  Code Status: Full Code    Disposition Plan   Expected discharge: 2 - 3 days, recommended to prior living arrangement once antibiotic plan established and SIRS/Sepsis treated. Dispo: Expected Discharge Date: 03/05/19        Entered: Jones Asher 03/03/2019, 9:25 AM   Information in the above section will display in the discharge planner report.      The patient's care was discussed with the Attending Physician, Dr. Manzanares.    Jones Asher  [facility]  Pager: [phone]  Please see sticky note for cross cover information    Interval History   No acute events overnight. Today, she tells me she has \"woken up\" and is feeling more like herself. She tells me she does not remember when she got here or what has happened during her hospital stay up until yesterday. Yesterday, she expressed concerns of discharging home because of difficulty with caring for herself and was wanting to discharge to a facility. Today, she says she doesn't know why she said that and would prefer to go home. She has been to nursing homes in the past and did not have good experiences. She denies chest pain, SOB, abd pain, nausea, vomiting. Is A&Ox3.     Physical " Exam   Vital Signs: Temp: 98.4  F (36.9  C) Temp src: Oral BP: 150/61 Pulse: 77 Heart Rate: 60 Resp: 18 SpO2: 99 % O2 Device: BiPAP/CPAP Oxygen Delivery: 1 LPM  Weight: 249 lbs 1.92 oz  General Appearance: Awake, alert, cooperative, comfortable, oriented x 3  Respiratory: lungs clear to auscultation without rales, rhonchi, wheeze, no respiratory distress   Cardiovascular: regular rate and rhythm, no murmur, no nuno, no rub, mild chest wall tenderness   GI: obese, soft, non-distended, no guarding, no rebound, non-tender   Skin: warm, dry   Other: Patient awake, alert, and oriented x3, answering questions appropriately, no confusion.       Data

## 2019-03-03 NOTE — PLAN OF CARE
Neuro: A&Ox4. Forgetful at times. Neuros intact. Numbness/tingling present at BL.  Cardiac: Afebrile. SR with HR's 60-70's. VSS.   Respiratory: Sating >92% on RA. LS clear/diminished.   GI/: Adequate urine output via rico. No BM.  Diet/appetite: Tolerating regular diet. Eating well. Denies N/V.  Activity: Assist of 2, repositioned q2hrs.   Pain: At acceptable level on current regimen.   Skin: No new deficits noted.  LDA's: R PIV - TKO    Plan: Continue with POC. Notify primary team with changes.

## 2019-03-03 NOTE — PROGRESS NOTES
[facility], [place]   Transplant Nephrology Progress Note  Date of Admission:  2/28/2019    Assessment & Plan    # LDKT: baseline Cr ~ 1.5-1.8; Stable   - Proteinuria: Nephrotic range   - Latest DSA: No Latest DSA: No   - BK Viremia: No   - Kidney Tx Biopsy: Yes 4/2017 heavy proteinuria biopsy shows FSGS secondary     ROB on LDKT: nonoliguric likely ATN related to sepsis, transplant kidney pyelonephritis, hx of FSGS in transplanted kidney and tacrolimus toxicity.  Tac level improved to 7 today.  I expect some degree of renal recovery over the next several days.    # Sepsis bacteremia UTI  - Urine culture likely Ecoli, Blood c/s   - abx per primar team  - Bactrim is listed as an allergy but it is not a true allergy.  The side effect listed is elevated creatinine (2016).  I think it is a good idea to restart bactrim after her antibiotics are completed for sepsis as a prophylactic measure against the urinary tract infections.  The true cause of her elevated creatinine in 2016 was related to FSGS (not bactrim related changes).    # tacrolimus toxicity: improved today down to 7  -- Restart tacrolimus at 2 mg bid starting tonight (ordered).    # Immunosuppression: Tacrolimus immediate release (goal  4-6) and Prednisone (dose  5 mg daily)   - Changes: No,    # Hypertension/Volume Status: Inadequate control; Goal BP: < 140/90   - Changes: No    - Continue to increase hydralazine as tolerated and will plan to restart losartan when creatinine under 2    # Diabetes: Controlled    # Anemia in chronic renal disease: Hgb: Stable   - Iron studies: Not checked recently    # Mineral Bone Disorder:    - Secondary renal hyperparathyroidism; PTH level is: Not checked recently  - Vitamin D; level is: Not checked recently  - Calcium; level is: Normal  - Phosphorus; level is: Normal    # Electrolytes:   - Potassium; level: Normal, hyperkalemia overnight likley RTA tacrolimus overdose, now improved.   -  "Magnesium; level: Normal  - Bicarbonate; level: Low    # Transplant History:  Etiology of kidney failure: diabetic nephropathy  Tx: LDKT  Transplant: 10/27/1999 (Kidney)  Donor Type: Living Donor Class:   Significant changes in immunosuppression: None  Significant transplant-related complications: recurrent UTIs    Recommendations were communicated to the primary team via this note.      Viral Eduar Ley MD  Pager: [phone]    Interval History    Patient feels better today, no chest pain.  Overnight no major issues.    Review of Systems   4 point ROS was obtained and negative except as noted in the interval history    Physical Exam   Temp  Av.8  F (37.1  C)  Min: 96.8  F (36  C)  Max: 101.4  F (38.6  C)      Pulse  Av.5  Min: 67  Max: 88 Resp  Av  Min: 8  Max: 20  SpO2  Av.6 %  Min: 83 %  Max: 100 %     /66 (BP Location: Left arm)   Pulse 77   Temp 98  F (36.7  C) (Axillary)   Resp 18   Ht 1.753 m (5' 9\")   Wt 113 kg (249 lb 1.9 oz)   SpO2 98%   BMI 36.79 kg/m     Date 19 0700 - 19 0659   Shift 0654-9506 9956-8979 2407-0247 24 Hour Total   INTAKE   I.V. 200   200   Shift Total(mL/kg) 200(1.78)   200(1.78)   OUTPUT   Urine 300   300   Shift Total(mL/kg) 300(2.67)   300(2.67)   Weight (kg) 112.5 112.5 112.5 112.5      Admit Weight: 108.9 kg (240 lb 1.6 oz)   GENERAL APPEARANCE: alert and no distress, emotional labile  HENT: mouth without ulcers or lesions  LYMPHATICS: no cervical or supraclavicular nodes  RESP: lungs clear to auscultation - no rales, rhonchi or wheezes  CV: regular rhythm, normal rate, no rub, no murmur  EDEMA: no LE edema bilaterally  ABDOMEN: soft, nondistended, nontender, bowel sounds normal  MS: extremities normal - no gross deformities noted, no evidence of inflammation in joints, no muscle tenderness  SKIN: no rash    Data   All labs reviewed by me.  CMP  Recent Labs   Lab 19  0526 19  1614 19  1215 19  0804 19  0330  " 02/28/19  0035    142  --  143 146*   < > 135   POTASSIUM 4.1 5.5*  --  5.2 4.4   < > 6.3*  6.4*   CHLORIDE 110* 113*  --  112* 113*   < > 108   CO2 24 19*  --  24 21   < > 23   ANIONGAP 7 10  --  8 12   < > 5   * 118*  --  82 108*   < > 90   BUN 42* 45*  --  48* 50*   < > 70*   CR 2.05* 1.92*  --  2.10* 2.25*   < > 2.27*   GFRESTIMATED 24* 26*  --  23* 21*   < > 21*   GFRESTBLACK 27* 30*  --  27* 24*   < > 24*   ROGER 8.2* 7.9*  --  8.2* 8.4*   < > 8.6   MAG 2.2 1.9 2.2 2.4* 2.3   < >  --    PHOS 2.8 3.0 3.3 3.6 3.5   < >  --    PROTTOTAL  --   --   --   --   --   --  7.8   ALBUMIN  --   --   --   --   --   --  3.2*   BILITOTAL  --   --   --   --   --   --  0.4   ALKPHOS  --   --   --   --   --   --  103   AST  --   --   --   --   --   --  31   ALT  --   --   --   --   --   --  31    < > = values in this interval not displayed.     CBC  Recent Labs   Lab 03/03/19  0526 03/02/19  0330 03/01/19  0545 02/28/19  1041 02/28/19  0612   HGB 9.0* 8.8* 9.4*  --  9.8*   WBC 6.3 7.1 10.9  --  12.8*   RBC 3.06* 2.97* 3.21*  --  3.35*   HCT 29.8* 28.9* 31.6*  --  33.0*   MCV 97 97 98  --  99   MCH 29.4 29.6 29.3  --  29.3   MCHC 30.2* 30.4* 29.7*  --  29.7*   RDW 14.3 14.6 14.3  --  14.0   * 107* 104* 100* 109*     INR  Recent Labs   Lab 02/28/19  0035   INR 1.09     ABG  Recent Labs   Lab 02/28/19  0612 02/28/19  0336   O2PER 96.0 95.0      Urine Studies  Recent Labs   Lab Test 02/28/19  2200 02/28/19  0215 02/27/19  1900 01/24/19 2010 01/11/18  1614 12/19/17  1620 11/13/17  1646   COLOR Light Yellow Yellow Yellow Light Yellow   < > Yellow Pink Yellow   APPEARANCE Slightly Cloudy Cloudy Slightly Cloudy Cloudy   < > Turbid Slightly Cloudy Turbid   URINEGLC 70* Negative Negative 30*   < > 100* 100* 500*   URINEBILI Negative Negative Negative Negative   < > Negative Negative Negative   URINEKETONE Negative Negative Negative Negative   < > Negative Negative Negative   SG 1.009 1.015 1.010 1.012   < > 1.025  1.015 1.015   UBLD Moderate* Large* Large* Moderate*   < > Large* Large* Moderate*   URINEPH 5.0 5.5 6.0 5.5   < > 6.0 6.5 6.5   PROTEIN 30* 100* 100* 100*   < > >=300* 100* 100*   UROBILINOGEN  --   --   --   --   --  0.2 0.2 0.2   NITRITE Negative Positive* Negative Negative   < > Positive* Negative Negative   LEUKEST Large* Large* Large* Large*   < > Large* Large* Small*   RBCU 31 64* 159* 93*   < > 5-10* 25-50* 25-50*   WBCU >182* 1,760* 139* >182*   < > >100* * >100*    < > = values in this interval not displayed.     Recent Labs   Lab Test 10/10/18  1503 09/17/18  2040 04/24/17  1930 10/07/11  1230 09/27/11  1600   UTPG 5.27* 2.96* 15.28* Test canceled by PCU/Clinic  Charge credited PER RADHA FALCON 0.07     PTH  Recent Labs   Lab Test 09/18/18  0627 03/11/17  0859 02/16/17  2303 10/07/11  1215 09/27/11  1600   PTHI 104* 334* 9* Test canceled by PCU/Clinic  Charge credited PER RADHA FALCON 53     Iron Studies  Recent Labs   Lab Test 02/17/17  2358 09/04/16  0815 08/07/16  0750 08/09/12  0819 07/14/12  0720 05/08/12 10/07/11  1215 09/27/11  1600   IRON 50 124 12* 68 154 68 Test canceled by PCU/Clinic  Charge credited CANCELED PER RADHA FALCON CORRECTED ON 10/07 AT 1459: PREVIOUSLY REPORTED AS 69 26*   * 128* 166* 188* 169*  --  Test canceled by PCU/Clinic  Charge credited CANCELED PER RADHA FALCON CORRECTED ON 10/07 AT 1459: PREVIOUSLY REPORTED  227*   IRONSAT 29 97* 7* 36 91*  --  Test canceled by PCU/Clinic  Charge credited CANCELED PER RADHA FALCON CORRECTED ON 10/07 AT 1459: PREVIOUSLY REPORTED AS 30 12*   GIANA 1,069* 2,676* 645*  --  444* 630?  --  72       IMAGING:  All imaging studies reviewed by me.     MEDICATIONS:  Current Facility-Administered Medications   Medication     acetaminophen (TYLENOL) Suppository 650 mg     baclofen (LIORESAL) half-tab 5 mg     calcium gluconate 10 % injection 2 g     cefTRIAXone (ROCEPHIN) 2 g vial to attach to  ml bag for ADULTS or NS 50 ml  bag for PEDS     glucose gel 15-30 g    Or     dextrose 50 % injection 25-50 mL    Or     glucagon injection 1 mg     glucose gel 15-30 g    Or     dextrose 50 % injection 25-50 mL    Or     glucagon injection 1 mg     diphenoxylate-atropine (LOMOTIL) 2.5-0.025 MG per tablet 1 tablet     gabapentin (NEURONTIN) capsule 600 mg     heparin sodium injection 5,000 Units     hydrALAZINE (APRESOLINE) injection 10 mg     insulin aspart (NovoLOG) inj (RAPID ACTING)     insulin glargine (LANTUS PEN) injection 12 Units     labetalol (NORMODYNE) half-tab 150 mg     lactobacillus rhamnosus (GG) (CULTURELL) capsule 1 capsule     levETIRAcetam (KEPPRA) tablet 750 mg     levothyroxine (SYNTHROID/LEVOTHROID) tablet 50 mcg     loratadine (CLARITIN) tablet 10 mg     melatonin tablet 1 mg     naloxone (NARCAN) injection 0.1-0.4 mg     naloxone (NARCAN) injection 0.4 mg     predniSONE (DELTASONE) tablet 5 mg     tacrolimus (GENERIC EQUIVALENT) capsule 2 mg

## 2019-03-04 PROBLEM — N39.0 COMPLICATED UTI (URINARY TRACT INFECTION): Status: ACTIVE | Noted: 2017-11-07

## 2019-03-04 PROBLEM — Z97.8 CHRONIC INDWELLING FOLEY CATHETER: Status: ACTIVE | Noted: 2018-07-05

## 2019-03-04 LAB
ANION GAP SERPL CALCULATED.3IONS-SCNC: 9 MMOL/L (ref 3–14)
BUN SERPL-MCNC: 39 MG/DL (ref 7–30)
C DIFF TOX B STL QL: NEGATIVE
CALCIUM SERPL-MCNC: 8.4 MG/DL (ref 8.5–10.1)
CHLORIDE SERPL-SCNC: 107 MMOL/L (ref 94–109)
CO2 SERPL-SCNC: 25 MMOL/L (ref 20–32)
CREAT SERPL-MCNC: 1.93 MG/DL (ref 0.52–1.04)
ERYTHROCYTE [DISTWIDTH] IN BLOOD BY AUTOMATED COUNT: 14 % (ref 10–15)
GFR SERPL CREATININE-BSD FRML MDRD: 25 ML/MIN/{1.73_M2}
GLUCOSE BLDC GLUCOMTR-MCNC: 196 MG/DL (ref 70–99)
GLUCOSE BLDC GLUCOMTR-MCNC: 202 MG/DL (ref 70–99)
GLUCOSE BLDC GLUCOMTR-MCNC: 223 MG/DL (ref 70–99)
GLUCOSE BLDC GLUCOMTR-MCNC: 235 MG/DL (ref 70–99)
GLUCOSE SERPL-MCNC: 220 MG/DL (ref 70–99)
HCT VFR BLD AUTO: 29.5 % (ref 35–47)
HGB BLD-MCNC: 8.7 G/DL (ref 11.7–15.7)
INTERPRETATION ECG - MUSE: NORMAL
MAGNESIUM SERPL-MCNC: 2.2 MG/DL (ref 1.6–2.3)
MCH RBC QN AUTO: 29.1 PG (ref 26.5–33)
MCHC RBC AUTO-ENTMCNC: 29.5 G/DL (ref 31.5–36.5)
MCV RBC AUTO: 99 FL (ref 78–100)
PHOSPHATE SERPL-MCNC: 2.9 MG/DL (ref 2.5–4.5)
PLATELET # BLD AUTO: 119 10E9/L (ref 150–450)
POTASSIUM SERPL-SCNC: 4.2 MMOL/L (ref 3.4–5.3)
RBC # BLD AUTO: 2.99 10E12/L (ref 3.8–5.2)
SODIUM SERPL-SCNC: 141 MMOL/L (ref 133–144)
SPECIMEN SOURCE: NORMAL
TACROLIMUS BLD-MCNC: 6.7 UG/L (ref 5–15)
TME LAST DOSE: NORMAL H
WBC # BLD AUTO: 5.1 10E9/L (ref 4–11)

## 2019-03-04 PROCEDURE — A9270 NON-COVERED ITEM OR SERVICE: HCPCS | Mod: GY | Performed by: STUDENT IN AN ORGANIZED HEALTH CARE EDUCATION/TRAINING PROGRAM

## 2019-03-04 PROCEDURE — 25000132 ZZH RX MED GY IP 250 OP 250 PS 637: Mod: GY | Performed by: FAMILY MEDICINE

## 2019-03-04 PROCEDURE — 36415 COLL VENOUS BLD VENIPUNCTURE: CPT | Performed by: FAMILY MEDICINE

## 2019-03-04 PROCEDURE — 00000146 ZZHCL STATISTIC GLUCOSE BY METER IP

## 2019-03-04 PROCEDURE — 25000128 H RX IP 250 OP 636: Performed by: FAMILY MEDICINE

## 2019-03-04 PROCEDURE — 25000132 ZZH RX MED GY IP 250 OP 250 PS 637: Mod: GY | Performed by: STUDENT IN AN ORGANIZED HEALTH CARE EDUCATION/TRAINING PROGRAM

## 2019-03-04 PROCEDURE — 85027 COMPLETE CBC AUTOMATED: CPT | Performed by: STUDENT IN AN ORGANIZED HEALTH CARE EDUCATION/TRAINING PROGRAM

## 2019-03-04 PROCEDURE — 25000125 ZZHC RX 250: Performed by: FAMILY MEDICINE

## 2019-03-04 PROCEDURE — 40000556 ZZH STATISTIC PERIPHERAL IV START W US GUIDANCE

## 2019-03-04 PROCEDURE — 93005 ELECTROCARDIOGRAM TRACING: CPT

## 2019-03-04 PROCEDURE — 80048 BASIC METABOLIC PNL TOTAL CA: CPT | Performed by: FAMILY MEDICINE

## 2019-03-04 PROCEDURE — 93010 ELECTROCARDIOGRAM REPORT: CPT | Performed by: INTERNAL MEDICINE

## 2019-03-04 PROCEDURE — 87493 C DIFF AMPLIFIED PROBE: CPT | Performed by: INTERNAL MEDICINE

## 2019-03-04 PROCEDURE — 25000131 ZZH RX MED GY IP 250 OP 636 PS 637: Mod: GY | Performed by: INTERNAL MEDICINE

## 2019-03-04 PROCEDURE — 83735 ASSAY OF MAGNESIUM: CPT | Performed by: FAMILY MEDICINE

## 2019-03-04 PROCEDURE — 25000128 H RX IP 250 OP 636: Performed by: STUDENT IN AN ORGANIZED HEALTH CARE EDUCATION/TRAINING PROGRAM

## 2019-03-04 PROCEDURE — 80197 ASSAY OF TACROLIMUS: CPT | Performed by: FAMILY MEDICINE

## 2019-03-04 PROCEDURE — 84100 ASSAY OF PHOSPHORUS: CPT | Performed by: FAMILY MEDICINE

## 2019-03-04 PROCEDURE — A9270 NON-COVERED ITEM OR SERVICE: HCPCS | Mod: GY | Performed by: FAMILY MEDICINE

## 2019-03-04 PROCEDURE — 12000001 ZZH R&B MED SURG/OB UMMC

## 2019-03-04 RX ORDER — AMLODIPINE BESYLATE 5 MG/1
5 TABLET ORAL DAILY
Status: DISCONTINUED | OUTPATIENT
Start: 2019-03-04 | End: 2019-03-05 | Stop reason: HOSPADM

## 2019-03-04 RX ORDER — DIPHENOXYLATE HCL/ATROPINE 2.5-.025MG
2 TABLET ORAL 4 TIMES DAILY PRN
Status: DISCONTINUED | OUTPATIENT
Start: 2019-03-04 | End: 2019-03-05 | Stop reason: HOSPADM

## 2019-03-04 RX ADMIN — Medication 1 CAPSULE: at 08:04

## 2019-03-04 RX ADMIN — GABAPENTIN 600 MG: 300 CAPSULE ORAL at 08:04

## 2019-03-04 RX ADMIN — AMLODIPINE BESYLATE 5 MG: 5 TABLET ORAL at 16:00

## 2019-03-04 RX ADMIN — DIPHENOXYLATE HYDROCHLORIDE AND ATROPINE SULFATE 2 TABLET: 2.5; .025 TABLET ORAL at 17:58

## 2019-03-04 RX ADMIN — TACROLIMUS 2 MG: 1 CAPSULE ORAL at 09:09

## 2019-03-04 RX ADMIN — HYDRALAZINE HYDROCHLORIDE 10 MG: 20 INJECTION INTRAMUSCULAR; INTRAVENOUS at 03:47

## 2019-03-04 RX ADMIN — Medication 150 MG: at 21:01

## 2019-03-04 RX ADMIN — HEPARIN SODIUM 5000 UNITS: 5000 INJECTION, SOLUTION INTRAVENOUS; SUBCUTANEOUS at 12:07

## 2019-03-04 RX ADMIN — HEPARIN SODIUM 5000 UNITS: 5000 INJECTION, SOLUTION INTRAVENOUS; SUBCUTANEOUS at 23:49

## 2019-03-04 RX ADMIN — GABAPENTIN 600 MG: 300 CAPSULE ORAL at 21:01

## 2019-03-04 RX ADMIN — Medication 1 CAPSULE: at 21:01

## 2019-03-04 RX ADMIN — Medication 150 MG: at 08:05

## 2019-03-04 RX ADMIN — PREDNISONE 5 MG: 5 TABLET ORAL at 08:04

## 2019-03-04 RX ADMIN — TACROLIMUS 2 MG: 1 CAPSULE ORAL at 17:59

## 2019-03-04 RX ADMIN — CEFTRIAXONE SODIUM 2 G: 2 INJECTION, POWDER, FOR SOLUTION INTRAMUSCULAR; INTRAVENOUS at 21:01

## 2019-03-04 RX ADMIN — DIPHENOXYLATE HYDROCHLORIDE AND ATROPINE SULFATE 1 TABLET: 2.5; .025 TABLET ORAL at 23:49

## 2019-03-04 RX ADMIN — HEPARIN SODIUM 5000 UNITS: 5000 INJECTION, SOLUTION INTRAVENOUS; SUBCUTANEOUS at 00:00

## 2019-03-04 RX ADMIN — LEVETIRACETAM 750 MG: 750 TABLET, FILM COATED ORAL at 21:01

## 2019-03-04 RX ADMIN — LEVOTHYROXINE SODIUM 50 MCG: 25 TABLET ORAL at 08:04

## 2019-03-04 RX ADMIN — DIPHENOXYLATE HYDROCHLORIDE AND ATROPINE SULFATE 1 TABLET: 2.5; .025 TABLET ORAL at 08:04

## 2019-03-04 RX ADMIN — LORATADINE 10 MG: 10 TABLET ORAL at 08:05

## 2019-03-04 RX ADMIN — LEVETIRACETAM 750 MG: 750 TABLET, FILM COATED ORAL at 08:05

## 2019-03-04 ASSESSMENT — ACTIVITIES OF DAILY LIVING (ADL)
ADLS_ACUITY_SCORE: 27

## 2019-03-04 NOTE — PROGRESS NOTES
[facility]    Transplant Infectious Diseases Inpatient Progress note      Gem Jade MRN# [id number]   YOB: 1945 Age: 73 year old   Date of Admission: 2/28/2019 12:30 AM  Transplant: 10/27/1999 (Kidney), Postoperative day: 7068     This note was attested by Dr. Paez  ATTESTATION   I interviewed and examined the patient myself. I also reviewed the chart and discussed the case with Dr. Payne.   I agree with the assessment and plan as written which reflects my overall impression and recommendations.     In addition  Ideally this patient needs full urological workup including cystoscopy, urodynamics, CT to rule out nephrolithiasis and renal abscesses. However, given severe functional limitation we can be satisfied by CT without contrast and may consider suppressive therapy with bactrim single strength daily or every other day when finished with the current ABx therapy.       Elaina Paez  3/4/2019   Pager: [phone]           Recommendations:   1. Continue Ceftriaxone until discharge  2. Repeat 12-lead EKG  3. If QTc normal, would transition to Cipro at discharge for total of 2-week treatment  4. Would consider sending C.Dif PCR with new onset diarrhea in setting of and history of C.Diff in the past (2014).   5. CT abdomen and pelvis without contrast to rule out nephrolithiasis and renal abscess.         Summary of Presentation:   Transplants:  10/27/1999 (Kidney), Postoperative day:  7068     This patient is a 73 year old female with PMH kidney transplant 1999 now on maintenance Tacrolimus and recurrent UTIs 2/2 chronic rico who presented with altered mental status likely secondary to urosepsis, complicated by E. Coli bacteremia, currently being treated with IV CTX with marked improvement.        Active Problems and Infectious Diseases Issues:      1. E. Coli Bacteremia 2/2 Urosepsis  1/2 blood cultures on admission positive for E. Coli, likely urinary source  given urine culture positive for E.Coli. History of E.Coli bacteremia 2/2 urosepsis in 5/2018. Cultures pan-susceptible. Will treat for two-week course with subsequent follow-up with ID outpatient. In light of Tacrolimus bolus administration last week, would continue IV abx until discharge and repeat 12-lead EKG to monitor QTc. If normal (last 502 on 3/1), can likely discharge on oral Ciprofloxacin given IV-PO formulations have equal, excellent bioavailability (has allergy to Levofloxacin, but has tolerated Ciprofloxacin in the past). Would treat for a total of 2 weeks.      2. Recurrent UTIs  Patient has extensive history of positive urine cultures (E.Coli, Candida, Citrobacter) over the past year, with admissions in 9/2018, 7/2018, and 5/2018 with concerns for UTIs. Guidelines on recurrent UTI prophylaxis can be nebulous. Typically, 3-4 UTIs in a one-year period can warrant discussion on UTI prophylaxis. However, it can be difficult in chronic rico patients to determine true UTIs from BPCUS or ABPUS. Patient states she is relatively asymptomatic when she has positive urine cultures. Does state she used to be on low-dose Bactrim daily for prophylaxis and is not sure why this was discontinued. This patient has relatively long lengths of time between admissions for UTIs; given the pan-susceptibility of the organisms she grows as well as the fact that the source of the infection (her chronic rico) remains, we would not want to contribute to future antibiotic resistance with prophylaxis. She was seen by Urology in the past for recurrent UTIs, and was supposed to follow-up outpatient, however patient states she does not get any outpatient treatment due to transportation barriers (must get ambulance ride to any healthcare provider). Thus, the decision of prophylaxis is not clear cut. May consider re-initiation of single-strength Bactrim prophylaxis after she completes this treatment. Considering she would be unable to  follow-up with ID outpatient, will likely leave recs on ppx on discharge.    3. Diarrhea  Patient reporting new onset diarrhea that started over the weekend before transfer to . States she does typically get diarrhea when treated with IV abx. Has history of +C.Diff in 2014. No leukocytosis or abdominal pain, but would consider sending sending C.Diff PCR with new frequent loose stools (no laxatives on board) in setting of antibiotic use.    Mago Payne MD   Internal Medicine, PGY-1  Pager: [phone]        Old Problems and Infectious Diseases Issues:       Other Infectious Disease issues include:  - QTc: 502 on 3/1  - PCP prophylaxis: None  - Serostatus: CMV D-/R-, EBV D-/R-, HSV1-/2-  - Immunization status: This patient last received pneumovax in 6/2013, but CMS recommends an extra [id number] for immunocompromised patients anyway. [Adults aged ?19 years with immunocompromising conditions, who previously have received ?1 doses of PPSV23 should be given a PCV13 dose ?1 year after the last PPSV23 dose was received.]  - Gamma globulin status: IgG 1,530 (4/25/2017)      Interim History and Events:   No acute events overnight. Patient seen and examined at bedside. Much more alert than prior. Denying abdominal pain, dysuria, fevers or chills. Endorsing diarrhea and fecal urgency when seen this morning. States she gets diarrhea with antibiotics and has a history of C.Diff in the past (2014). Currently being treated with Lamotil.     HPI:  Ms. Jade is a 73 year-old female with PMH kidney transplant 1999, T2DM, and recurrent UTIs 2/2 indwelling rico catheter who presented to ED on 2/28 for altered mental status concerning for UTI. ID consulted for E.Coli bacteremia treatment as well as question on recurrent UTI prophylaxis.      Patient has history of UTIs. 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 Most recently, she was admitted in  September 2018 for AMS and urosepsis, at that time UCx were positive for E.Coli and BCx were negative. She was initiated on Cefepime, de-escalated to Ceftriaxone, and ultimately discharged on a 14-day course of Cefpodoxime.      Patient was altered when evaluated today, and history obtained from EMR and assistance from nursing. Per EMR, patient started developing confusion on 2/27. Home RN came to home and performed UA and  was awaiting results but decided to bring patient to ED given increasing confusion. She was given 2L IVF and started on Vanc/Zosyn. She was found to have elevated potassium at 6.4, ROB with creatinine at 2.26 (baseline 1.6), and leukocytosis of 11.4. Her UA showed large LE, blood, with 139 WBC and 159 RBCs. BCx were taken on admission and 1/2 bottles turned positive at ~15 hours for E.Coli on Verigene.      Overnight, Tacrolimus gtt was initiated at intended rate of 2.8mL/hr, but due to pump dysfunction, she received total 250mL over one hour, equating to over 7.5x her daily dose of Tacrolimus. Nephro was emergently contacted and supportive plan of care in place. Did develop some delirium overnight, and appears emotionally labile today. Today, creatinine is slowly rising, K is stable, and patient remains on bicarb gtt.      ROS:  As mentioned in the interim history otherwise negative by reviewing constitutional symptoms, central and peripheral neurological systems, respiratory system, cardiac system, GI system,  system, musculoskeletal, skin, allergy, and lymphatics.                 Pysical Examination:  Temp: 98.6  F (37  C) Temp src: Oral BP: 132/49 Pulse: 65 Heart Rate: 75 Resp: 16 SpO2: 98 % O2 Device: BiPAP/CPAP Oxygen Delivery: 2 LPM  Vitals:    02/28/19 0200 03/01/19 0400 03/03/19 0300   Weight: 108.9 kg (240 lb 1.6 oz) 112.5 kg (248 lb 0.3 oz) 113 kg (249 lb 1.9 oz)       Constitutional: awake, alert, cooperative, no apparent distress and appears at stated age, well nourished.  A&Ox4.  Head, ENT, Eyes, and Neck: Normocephalic, external ears without lesions, moist buccal mucosa without oral thrush, EOMI, pink conjunctivae, non-icteric sclera.   Neck supple without rigidity, no cervical/axillary/inguinal LA bilaterally.    Neurologic: Patient is moving all extremities without focal deficit, no focal sensory loss.   Lungs: CTA bilaterally, no accessory muscle use  CVS: RRR, normal S1/S2, no murmur, PMI was not displaced.   Abdomen: non-tender, non-distended, no masses, no shifting dullness, normal BS.   Genitalia: rico catheter in place  Extremities: no LE edema, no ulcers, normal ROM of all joints, no swelling or erythema of any of joints and no tenderness to palpation.   Skin: warm, dry, no rashes    Scheduled Medications:     calcium gluconate  2 g Intravenous Once     cefTRIAXone  2 g Intravenous Q24H     gabapentin  600 mg Oral BID     heparin  5,000 Units Subcutaneous Q12H     insulin aspart  1-12 Units Subcutaneous 4x Daily AC & HS     insulin glargine  12 Units Subcutaneous At Bedtime     labetalol  150 mg Oral BID     lactobacillus rhamnosus (GG)  1 capsule Oral BID     levETIRAcetam  750 mg Oral BID     levothyroxine  50 mcg Oral Daily     loratadine  10 mg Oral Daily     predniSONE  5 mg Oral Daily     tacrolimus  2 mg Oral BID IS       Laboratory Data:   Absolute CD4   Date Value Ref Range Status   02/17/2017 379 (L) 441 - 2,156 cells/uL Final       Inflammatory Markers    Recent Labs   Lab Test 03/08/17  1243 08/31/16  0801 05/25/16  2116 02/18/16  1436 04/28/15  1650 12/29/14  1330 10/22/13  1245   SED  --   --  53*  --   --   --   --    CRP 7.5 39.0* <2.9 8.8* 145.0* 114.0* 20.0*       Immune Globulin Studies     Recent Labs   Lab Test 04/25/17  1436   IGG 1,530             Metabolic Studies       Recent Labs   Lab Test 03/04/19  0432 03/03/19  2103 03/03/19  0526 03/02/19  1614  03/02/19  0804 03/02/19  0330  02/28/19  0612 02/28/19  0336  02/28/19  0035   03/09/17  1310  08/07/12  0647    139 141 142  --  143 146*   < > 139 137   < > 135   < >  --    < > 135   POTASSIUM 4.2 4.5 4.1 5.5*  --  5.2 4.4   < > 5.5* 6.0*   < > 6.3*  6.4*   < >  --    < > 5.9*   CHLORIDE 107 107 110* 113*  --  112* 113*   < > 112* 110*  --  108   < >  --    < > 104   CO2 25 23 24 19*  --  24 21   < > 20 18*  --  23   < >  --    < > 22   ANIONGAP 9 8 7 10  --  8 12   < > 7 8  --  5   < >  --    < > 8   BUN 39* 39* 42* 45*  --  48* 50*   < > 62* 62*  --  70*   < >  --    < > 21   CR 1.93* 1.97* 2.05* 1.92*  --  2.10* 2.25*   < > 2.26* 2.20*  --  2.27*   < >  --    < > 1.49*   GFRESTIMATED 25* 25* 24* 26*  --  23* 21*   < > 21* 22*  --  21*   < >  --    < > 35*   * 195* 112* 118*  --  82 108*   < > 115* 99   < > 90   < >  --    < > 79   A1C  --   --   --   --   --   --   --   --  7.3*  --   --   --    < >  --    < >  --    ROGER 8.4* 8.4* 8.2* 7.9*  --  8.2* 8.4*   < > 8.4* 8.6  --  8.6   < >  --    < > 8.6   PHOS 2.9 2.9 2.8 3.0   < > 3.6 3.5   < > 3.7  --   --   --    < >  --    < >  --    MAG 2.2 2.1 2.2 1.9   < > 2.4* 2.3   < > 2.6*  --   --   --    < >  --    < >  --    LACT  --   --   --   --   --   --   --   --   --  0.6*  --  1.4   < >  --    < >  --    CKT  --   --   --   --   --   --   --   --   --   --   --   --   --  36  --  <20*    < > = values in this interval not displayed.       Hepatic Studies    Recent Labs   Lab Test 02/28/19  0035 09/17/18  1251 09/16/18  0751 09/15/18  2347 07/05/18  1418 05/28/18  0520 05/25/18  0126  09/03/16  1329 08/30/16  1709   BILITOTAL 0.4 0.2  --  0.3 0.3 0.3 0.5   < >  --  0.5   ALKPHOS 103 54  --  71 91 59 88   < >  --  69   ALBUMIN 3.2* 2.6* 2.8* 3.0* 3.5 2.4* 3.0*   < >  --  3.0*   AST 31 6  --  5 27 17 8   < >  --  8   ALT 31 9  --  10 42 14 17   < >  --  8   LDH  --   --   --   --   --   --   --   --  150 157    < > = values in this interval not displayed.       Pancreatitis testing    Recent Labs   Lab Test 11/06/17  1773  04/19/17  0005 12/05/16  1210 02/05/15  1250 09/02/13  0053 06/29/13  1711 05/16/13  1240 06/16/11  1230   LIPASE 99 190  --   --  92 125  --   --    TRIG  --   --  272* 223*  --   --  262* 226*       Hematology Studies      Recent Labs   Lab Test 03/04/19  0432 03/03/19  0526 03/02/19  0330 03/01/19  0545  02/28/19  0612 02/28/19  0038 02/28/19  0035  02/04/19  1252 01/24/19  2005 01/04/19  1300 12/03/18  1215 11/02/18  1230   WBC 5.1 6.3 7.1 10.9  --  12.8*  --  11.4*   < > 6.0 6.3 5.6 4.5 6.1   ANEU  --   --   --   --   --   --   --  10.5*  --  4.0 3.8 3.0 2.0 3.5   ALYM  --   --   --   --   --   --   --  0.4*  --  1.4 1.4 1.8 1.7 1.9   CARROLL  --   --   --   --   --   --   --  0.5  --  0.4 0.7 0.5 0.5 0.5   AEOS  --   --   --   --   --   --   --  0.0  --  0.2 0.3 0.3 0.3 0.3   HGB 8.7* 9.0* 8.8* 9.4*  --  9.8* 11.6* 10.7*   < > 10.8* 10.1* 10.1* 10.3* 10.2*   HCT 29.5* 29.8* 28.9* 31.6*  --  33.0*  --  36.5   < > 34.4* 32.0* 32.7* 32.5* 33.2*   * 114* 107* 104*   < > 109*  --  101*   < > 161 129* 165 131* 149*    < > = values in this interval not displayed.       Arterial Blood Gas Testing    Recent Labs   Lab Test 02/28/19  0612 02/28/19  0336 05/28/18  1756 05/25/18  0817 01/21/18  0955  01/19/18  0543 01/18/18  1545   PH  --   --  7.35 7.33* 7.40  --  7.50* 7.43   PCO2  --   --  37 39 35  --  33* 39   PO2  --   --  145* 145* 112*  --  125* 169*   HCO3  --   --  20* 21 21  --  26 26   O2PER 96.0 95.0 3 LPM 30% 30%   < >  --  Ventilator    < > = values in this interval not displayed.        Urine Studies     Recent Labs   Lab Test 02/28/19  2200 02/28/19  0215 02/27/19  1900 01/24/19 2010 01/04/19  1300   URINEPH 5.0 5.5 6.0 5.5 6.0   NITRITE Negative Positive* Negative Negative Negative   LEUKEST Large* Large* Large* Large* Large*   WBCU >182* 1,760* 139* >182* >182*       Vancomycin Levels     Recent Labs   Lab Test 03/01/19  0544 12/20/17  1835 08/29/13  1310   VANCOMYCIN 18.2 20.1 <5.0 Traditional  Dosing therapeutic Range:         Trough 8-20 mg/L         Peak 20-50 mg/L       Tobramycin levels     No lab results found.    Gentamicin levels    No lab results found.    Tacrolimus levels    Invalid input(s): TACROLIMUS, TAC, TACR  Transplant Immunosuppression Labs Latest Ref Rng & Units 3/4/2019 3/3/2019 3/3/2019 3/2/2019 3/2/2019   Siro Level 5.0 - 15.0 ug/L - - - - -   Siro Level - - - - - -   Tacro Level 5.0 - 15.0 ug/L - - 7.9 - -   Tacro Level - - - Not Provided - -   Myco. Acid Level 1.00 - 3.50 mg/L - - - - -   Creat 0.52 - 1.04 mg/dL 1.93(H) 1.97(H) 2.05(H) 1.92(H) 2.10(H)   BUN 7 - 30 mg/dL 39(H) 39(H) 42(H) 45(H) 48(H)   WBC 4.0 - 11.0 10e9/L 5.1 - 6.3 - -   Neutrophil % - - - - -   ANEU 1.6 - 8.3 10e9/L - - - - -       Cyclosporine levels    Invalid input(s): CYCLOSPORINE, CYC    Mycophenolate levels    Invalid input(s): MYPA, MYP    Sirolimus levels    Invalid input(s): SIROLIMUS, SIR, RAPA    CSF testing     Recent Labs   Lab Test 05/30/18  0845 09/02/16  0940   CWBC Canceled, Test credited  0 0  Duplicate request  Test not indicated.     CRBC Canceled, Test credited  3* 2  Duplicate request  Test not indicated.     CGLU 100* 89*   CTP 48 44         Microbiology:  All cultures:  Recent Labs   Lab 03/01/19  0545 03/01/19  0532 02/28/19  0215 02/28/19  0035 02/28/19  0029 02/27/19  1900   CULT No growth after 3 days No growth after 3 days 50,000 to 100,000 colonies/mL  Escherichia coli  * Cultured on the 1st day of incubation:  Escherichia coli  *  Critical Value/Significant Value, preliminary result only, called to and read back by  Sarah Hightower RN on 2.28.19 at 1516. bw    (Note)  POSITIVE for E.COLI by Verigene multiplex nucleic acid test. Final  identification and antimicrobial susceptibility testing will be  verified by standard methods. Verigene test will not distinguish  E.coli from Shigella species including S.dysenteriae, S.flexneri,  S.boydii, and S.sonnei. Specimens containing  Shigella species or  E.coli will be reported as Positive for E.coli.    Specimen tested with Verigene multiplex, gram-negative blood culture  nucleic acid test for the following targets: Acinetobacter sp.,  Citrobacter sp., Enterobacter sp., Proteus sp., E. coli, K.  pneumoniae/oxytoca, P. aeruginosa, and the following resistance  markers: CTXM, KPC, NDM, VIM, IMP and OXA.    Critical Value/Significant Value called to and read back by GARTH BANGURA, RN 1729 2.28.19 NDP   No growth after 4 days >100,000 colonies/mL  Escherichia coli  *         Last check of C difficile  C Diff Toxin B PCR   Date Value Ref Range Status   01/23/2018 Negative NEG^Negative Final     Comment:     Negative: Clostridium difficile target DNA sequences NOT detected, presumed   negative for Clostridium difficile toxin B or the number of bacteria present   may be below the limit of detection for the test.  FDA approved assay performed using [employer] GeneXpert real-time PCR.  A negative result does not exclude actual disease due to Clostridium difficile   and may be due to improper collection, handling and storage of the specimen   or the number of organisms in the specimen is below the detection limit of the   assay.         Virology:    No results found for: H6RES    EBV DNA Copies/mL   Date Value Ref Range Status   09/18/2018 32,906 (A) EBVNEG^EBV DNA Not Detected [Copies]/mL Final   04/20/2017 61,922 (A) EBVNEG [Copies]/mL Final   03/10/2017 89,252 (A) EBVNEG [Copies]/mL Final   02/17/2017 139,026 (A) EBVNEG [Copies]/mL Final   08/30/2016 2,393 (A) EBVNEG [Copies]/mL Final   05/28/2016 9,498 (A) EBVNEG [Copies]/mL Final       BK viral loads   Recent Labs   Lab Test 04/20/17  2021 03/09/17  1310 02/17/17  2358 10/12/12  0713   BKSPEC Plasma Plasma, EDTA anticoagulant  CORRECTED ON 03/09 AT 1319: PREVIOUSLY REPORTED AS Plasma   Plasma Plasma, EDTA anticoagulant   BKRES BK Virus DNA Not Detected BK Virus DNA Not Detected BK Virus DNA Not Detected  <1000         Imaging:  None new

## 2019-03-04 NOTE — PROGRESS NOTES
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  Tac level improved to 6.7 today. Cr now trending down to 1.9 from 2.05 yesterday    # Sepsis bacteremia UTI  - Urine culture likely Ecoli, Blood c/s   - abx per primar team  - Bactrim is listed as an allergy but it is not a true allergy.  The side effect listed is elevated creatinine (2016).  I think it is a good idea to restart bactrim after her antibiotics are completed for sepsis as a prophylactic measure against the urinary tract infections.  The true cause of her elevated creatinine in 2016 was related to FSGS (not bactrim related changes).  - cont abx for complicated UTI and need prolonged course 14days total      # tacrolimus toxicity: improved today down to 6.7  -- Restart tacrolimus at 2 mg bid. F/u level     # Immunosuppression: Tacrolimus immediate release (goal  4-6) and Prednisone (dose  5 mg daily)   - Changes: No,    # Hypertension/Volume Status: Inadequate control; Goal BP: < 140/90   - Changes: No    - Continue to increase hydralazine as tolerated   - will cont holding home  losartan now until Cr is lower to base line     # Diabetes: Controlled    # Anemia in chronic renal disease: Hgb: Stable   - Iron studies: Not checked recently    # Mineral Bone Disorder:    - Secondary renal hyperparathyroidism; PTH level is: Not checked recently  - Vitamin D; level is: Not checked recently  - Calcium; level is: Normal  - Phosphorus; level is: Normal    # Electrolytes:   - Potassium; level: Normal,  Resolved now  "hyperkalemia likley RTA tacrolimus overdose.  - Magnesium; level: Normal  - Bicarbonate; level: Normal      # Diarrhea: complaining of loose stool,hx of C-Diff in the past now s/p broad spectrum abx will recommend check C-diff.     # Transplant History:  Etiology of kidney failure: diabetic nephropathy  Tx: LDKT  Transplant: 10/27/1999 (Kidney)  Donor Type: Living Donor Class:   Significant changes in immunosuppression: None  Significant transplant-related complications: recurrent UTIs    Recommendations were communicated to the primary team via this note. And pager     Seen and discussed with Dr Aida Carpio MD  Pager: [phone]    Interval History    Feels fine, complaining of diarrhea loose stool x2 last night, no fever no pain, no new complaint.     Review of Systems   4 point ROS was obtained and negative except as noted in the interval history    Physical Exam   Temp  Av.8  F (37.1  C)  Min: 96.8  F (36  C)  Max: 101.4  F (38.6  C)      Pulse  Av.5  Min: 67  Max: 88 Resp  Av  Min: 8  Max: 20  SpO2  Av.6 %  Min: 83 %  Max: 100 %     /46 (BP Location: Left arm)   Pulse 68   Temp 98.9  F (37.2  C) (Oral)   Resp 16   Ht 1.753 m (5' 9\")   Wt 113 kg (249 lb 1.9 oz)   SpO2 96%   BMI 36.79 kg/m     Date 19 0700 - 19 0659   Shift 1647-6419 7076-4494 8286-1732 24 Hour Total   INTAKE   I.V. 200   200   Shift Total(mL/kg) 200(1.78)   200(1.78)   OUTPUT   Urine 300   300   Shift Total(mL/kg) 300(2.67)   300(2.67)   Weight (kg) 112.5 112.5 112.5 112.5      Admit Weight: 108.9 kg (240 lb 1.6 oz)   GENERAL APPEARANCE: alert and no distress, emotional labile  HENT: mouth without ulcers or lesions  LYMPHATICS: no cervical or supraclavicular nodes  RESP: lungs clear to auscultation - no rales, rhonchi or wheezes  CV: regular rhythm, normal rate, no rub, no murmur  EDEMA: no LE edema bilaterally  ABDOMEN: soft, nondistended, nontender, bowel sounds normal  MS: extremities " normal - no gross deformities noted, no evidence of inflammation in joints, no muscle tenderness  SKIN: no rash    Data   All labs reviewed by me.  CMP  Recent Labs   Lab 03/04/19  0432 03/03/19  2103 03/03/19  0526 03/02/19  1614  02/28/19  0035    139 141 142   < > 135   POTASSIUM 4.2 4.5 4.1 5.5*   < > 6.3*  6.4*   CHLORIDE 107 107 110* 113*   < > 108   CO2 25 23 24 19*   < > 23   ANIONGAP 9 8 7 10   < > 5   * 195* 112* 118*   < > 90   BUN 39* 39* 42* 45*   < > 70*   CR 1.93* 1.97* 2.05* 1.92*   < > 2.27*   GFRESTIMATED 25* 25* 24* 26*   < > 21*   GFRESTBLACK 29* 29* 27* 30*   < > 24*   ROGER 8.4* 8.4* 8.2* 7.9*   < > 8.6   MAG 2.2 2.1 2.2 1.9   < >  --    PHOS 2.9 2.9 2.8 3.0   < >  --    PROTTOTAL  --   --   --   --   --  7.8   ALBUMIN  --   --   --   --   --  3.2*   BILITOTAL  --   --   --   --   --  0.4   ALKPHOS  --   --   --   --   --  103   AST  --   --   --   --   --  31   ALT  --   --   --   --   --  31    < > = values in this interval not displayed.     CBC  Recent Labs   Lab 03/04/19 0432 03/03/19  0526 03/02/19  0330 03/01/19  0545   HGB 8.7* 9.0* 8.8* 9.4*   WBC 5.1 6.3 7.1 10.9   RBC 2.99* 3.06* 2.97* 3.21*   HCT 29.5* 29.8* 28.9* 31.6*   MCV 99 97 97 98   MCH 29.1 29.4 29.6 29.3   MCHC 29.5* 30.2* 30.4* 29.7*   RDW 14.0 14.3 14.6 14.3   * 114* 107* 104*     INR  Recent Labs   Lab 02/28/19  0035   INR 1.09     ABG  Recent Labs   Lab 02/28/19  0612 02/28/19  0336   O2PER 96.0 95.0      Urine Studies  Recent Labs   Lab Test 02/28/19  2200 02/28/19  0215 02/27/19  1900 01/24/19 2010 01/11/18  1614 12/19/17  1620 11/13/17  1646   COLOR Light Yellow Yellow Yellow Light Yellow   < > Yellow Pink Yellow   APPEARANCE Slightly Cloudy Cloudy Slightly Cloudy Cloudy   < > Turbid Slightly Cloudy Turbid   URINEGLC 70* Negative Negative 30*   < > 100* 100* 500*   URINEBILI Negative Negative Negative Negative   < > Negative Negative Negative   URINEKETONE Negative Negative Negative Negative   <  > Negative Negative Negative   SG 1.009 1.015 1.010 1.012   < > 1.025 1.015 1.015   UBLD Moderate* Large* Large* Moderate*   < > Large* Large* Moderate*   URINEPH 5.0 5.5 6.0 5.5   < > 6.0 6.5 6.5   PROTEIN 30* 100* 100* 100*   < > >=300* 100* 100*   UROBILINOGEN  --   --   --   --   --  0.2 0.2 0.2   NITRITE Negative Positive* Negative Negative   < > Positive* Negative Negative   LEUKEST Large* Large* Large* Large*   < > Large* Large* Small*   RBCU 31 64* 159* 93*   < > 5-10* 25-50* 25-50*   WBCU >182* 1,760* 139* >182*   < > >100* * >100*    < > = values in this interval not displayed.     Recent Labs   Lab Test 10/10/18  1503 09/17/18  2040 04/24/17  1930 10/07/11  1230 09/27/11  1600   UTPG 5.27* 2.96* 15.28* Test canceled by PCU/Clinic  Charge credited PER RADHA FALCON 0.07     PTH  Recent Labs   Lab Test 09/18/18  0627 03/11/17  0859 02/16/17  2303 10/07/11  1215 09/27/11  1600   PTHI 104* 334* 9* Test canceled by PCU/Clinic  Charge credited PER RADHA FALCON 53     Iron Studies  Recent Labs   Lab Test 02/17/17  2358 09/04/16  0815 08/07/16  0750 08/09/12  0819 07/14/12  0720 05/08/12 10/07/11  1215 09/27/11  1600   IRON 50 124 12* 68 154 68 Test canceled by PCU/Clinic  Charge credited CANCELED PER RADHA FALCON CORRECTED ON 10/07 AT 1459: PREVIOUSLY REPORTED AS 69 26*   * 128* 166* 188* 169*  --  Test canceled by PCU/Clinic  Charge credited CANCELED PER RADHA FALCON CORRECTED ON 10/07 AT 1459: PREVIOUSLY REPORTED  227*   IRONSAT 29 97* 7* 36 91*  --  Test canceled by PCU/Clinic  Charge credited CANCELED PER RADHA DELGADOY CORRECTED ON 10/07 AT 1459: PREVIOUSLY REPORTED AS 30 12*   GIANA 1,069* 2,676* 645*  --  444* 630?  --  72       IMAGING:  All imaging studies reviewed by me.     MEDICATIONS:  Current Facility-Administered Medications   Medication     acetaminophen (TYLENOL) Suppository 650 mg     baclofen (LIORESAL) half-tab 5 mg     cefTRIAXone (ROCEPHIN) 2 g vial to attach to  ml  bag for ADULTS or NS 50 ml bag for PEDS     glucose gel 15-30 g    Or     dextrose 50 % injection 25-50 mL    Or     glucagon injection 1 mg     diphenoxylate-atropine (LOMOTIL) 2.5-0.025 MG per tablet 1 tablet     gabapentin (NEURONTIN) capsule 600 mg     heparin sodium injection 5,000 Units     hydrALAZINE (APRESOLINE) injection 10 mg     insulin aspart (NovoLOG) inj (RAPID ACTING)     insulin glargine (LANTUS PEN) injection 12 Units     labetalol (NORMODYNE) half-tab 150 mg     lactobacillus rhamnosus (GG) (CULTURELL) capsule 1 capsule     levETIRAcetam (KEPPRA) tablet 750 mg     levothyroxine (SYNTHROID/LEVOTHROID) tablet 50 mcg     loratadine (CLARITIN) tablet 10 mg     melatonin tablet 1 mg     naloxone (NARCAN) injection 0.1-0.4 mg     predniSONE (DELTASONE) tablet 5 mg     tacrolimus (GENERIC EQUIVALENT) capsule 2 mg   Patient was seen and evaluated by me, Matt Parra MD. I have reviewed the note and agree with the the plan of care as documented by the fellow.

## 2019-03-04 NOTE — PLAN OF CARE
Neuro: A&Ox4. Chronic numbness and tingling to bilateral lower extremities.  Cardiac: SR. VSS. Restarted on amlodipine    Respiratory: Sating >92%  on RA, desats to 88-89 while sleeping, on 1L O2 sleeping.  GI/: BM x2, need a c diff sample. Lomotil given x2. Chronic rico in place.  Diet/appetite: Tolerating regular diet. Eating well.  Activity:  Assist of 2 with turns, repositioned q2hr.  Pain: At acceptable level on current regimen.   Skin: No new deficits noted.  LDA's: PIV to R- TKO    Plan: Possible discharge tomorrow. Continue with POC. Notify primary team with changes.

## 2019-03-04 NOTE — PROGRESS NOTES
Care Coordinator Progress Note    Admission Date/Time:  2/28/2019  Attending MD:  Shayna Kahn MD    Data  Chart reviewed, discussed with interdisciplinary team.   Patient was admitted for:    Severe sepsis (H)  Acute renal failure, unspecified acute renal failure type (H)  Hyperkalemia  Urinary tract infection associated with indwelling urethral catheter, initial encounter (H)  Acute kidney injury (H)  Kidney replaced by transplant.    Concerns with insurance coverage for discharge needs: None.  Current Living Situation: Patient lives with spouse.  Equipment in the home:high back wheelchair that reclines (not power) at home with a power christiana lift, hospital bed, working glucometer and bedpan  Support System: Supportive and Involved  Services Involved: Home Care; [facility]:RN EOW & HHA 3xwk  Transportation at Discharge: Agency transportation; [employer] stretcher (no steps and ramp at home)  Transportation to Medical Appointments:  - Name of caregiver: spouse Chang and daughter Jessica  Barriers to Discharge: none    Coordination of Care    3/5: Patient is medically ready for discharge. [employer] stretcher ride arranged for 2pm today (patient, spouse, nurse, charge nurse notified and medical team notified).    3/4: D: Chart reviewed and plan of care discussed with Medical Team. Plan for patient to discharge when medically stable and antibiotic plan has been determined.  I/A:  Patient is open to [facility] services and would like to continue with them upon discharge. Per medical team discharge antibiotic plan is undetermined.     Spoke with patient and explained; just in case IV ABX are needed at discharge she would like [facility] as agency (benefit check completed). Patient is Medicare only, does not have home infusion benefits. If IV ABX are needed patient needs TCU placement or as long as IV ABX are only once daily outpatient infusion center could be arranged.    Patient states she requires [employer]  stretcher for transport at discharge.    P: Care Coordinator will continue to follow.        Referrals: Provided patient with options for Home Care and Home Infusion.        [facility]  Phone  [phone]  Fax  [phone]     Resumption of Home Care Services  RN Every Other Week  HHA 3x week  __________________       Plan  Anticipated Discharge Date:  Tues  Anticipated Discharge Plan:  Home    CTS Handoff Complete: YES    Kassandra Wright RN, BSN, PHN  Medicine Care Coordinator  [facility]  Desk Phone: [phone]  Pager: [phone]    To contact Weekend RNCC, dial [phone] and enter job code [phone] at prompt.   This pager can not be contacted by text page or outside line.

## 2019-03-04 NOTE — PLAN OF CARE
Neuro: A&Ox4. Neuros intact. Numbness/tingling present at BL.  Cardiac: Afebrile. SR with HR's 60-70's. Hypertensive 170-180's/70's. PRN IV Hydralazine given x1.         Respiratory: Sating >92% on 1L + CPAP overnight. LS clear/diminished.   GI/: Chronic rico replaced, adequate UOP. No BM. PRN PO Lomotil given x1.  Diet/appetite: Tolerating regular diet. Eating well. Denies N/V.  Activity: Assist of 2, repositioned q2hrs.   Pain: Denied overnight.  Skin: No new deficits noted.  LDA's: R PIV - TKO     Plan: Continue with POC. Notify primary team with changes.

## 2019-03-04 NOTE — PROGRESS NOTES
[facility] Progress Note - [facility] Service       Main Plans for Today   Continue ceftriaxone 2g daily  Daily labs - BMP, Mg, phos, CBC, tacro level   Transplant Nephrology Consulted  Transplant ID consulted       Assessment & Plan   Gem Jade is a 73 year old female admitted on 2/28/2019. She has a history of DM2, polyneuropathy, kidney transplant (1999) and recurrent UTIs secondary to indwelling rico complicated by toxic encephalopathy and is admitted for sepsis secondary to UTI and toxic metabolic encephalopathy complicated by a tacrolimus overdose while here.    #Acute encephalopathy likely due to sepsis   #Severe Sepsis likely due to urinary tract infection  Febrile in ED with mild leukocytosis, normal lactate. Procal was moderate. Had AMS which has slowly improved with treatment of sepsis and now is A/O x 3. Has a history of recurrent infections with indwelling rico which was changed in the ED. UC/BCx both growing pan sensitive E coli.    -Continue ceftriaxone 2g daily. Transplant ID to assist with antibiotic plan.   -Regular diet     #Tacrolimus overdose  #Hyperkalemia  # LDKT (1999)  Patient received IV tacrolimus 250 ml as bolus, equal to 15 mg PO. Home dose is 2 mg BID. Tacrolimus level 25.3 (3/1)-->14.5 (3/2)-->7 (3/3).  Has altered mental status but improving and likely related to improving sepsis. Also had hyperkalemia treated with lasix and insulin which has remained stable. 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 EKG has showed stable qtc. Cr now improving and continues to have urine output.   - resumed home tacrolimus   - daily tacrolimus level     - appreciate assistance of pharmacy and transplant nephrology      #Type II DM  Home regimen is novolog TID with meals and lantus 24 units nightly  -increase Lantus to 18 U at bedtime   -HSSI  -Glucose checks     #Hypertension   #paroxysymal A fib  Continue to hold losartan. BP elev above goal for txplanted  kidney, added amlodipine 5mg daily 3/4  -As needed hydralazine IV    -Resume pravastatin  -Resume labetalol     #Hypothyroidism  -Resume synthroid     #Neuropathy  -PTA gabapentin  -PTA baclofen    # Chest pain-resolved  Patient complained of chest pain on 3/1 am. Unable to obtain further history at that time due to AMS. Hemodynamically stable. EKG was unremarkable for ischemic changes. Troponin also negative. Low concern for cardiac etiology and chest pain is now resolved.     # Pain Assessment:  Current Pain Score 3/4/2019   Patient currently in pain? denies   Pain score (0-10) -   Pain location -   Pain descriptors -   CPOT pain score -   Gem swan pain level was assessed and she currently denies pain.      Diet: Regular Diet Adult  Fluids: PO  DVT Prophylaxis: Heparin SQ  Code Status: Full Code    Disposition Plan   Expected discharge: 2 - 3 days, recommended to prior living arrangement once antibiotic plan established and SIRS/Sepsis treated. Dispo: Expected Discharge Date: 03/05/19 Concern upon discharge is her inability to get to a PCP - mobility is via christiana lift and WC and she hence does not attend outpt appointments. Will discuss options w/ SW/CC.        Entered: Gregg Cevallos 03/04/2019, 2:29 PM   Information in the above section will display in the discharge planner report.      The patient's care was discussed with the attending Dr. Chen.    Gregg Cevallos  [facility]  Pager: [phone]  Please see sticky note for cross cover information    Interval History   No acute events overnight. Denies any pain or new complaints this morning. Still having frequent diarrhea since starting the antibiotics but no abdominal pain or fevers. She also denies any further chest pain, cough, shortness of breath, nausea, vomiting, and is eating and drinking well. The patient did have some leaking around her rico which required it to be changed.     Physical Exam   Vital Signs:  Temp: 98.9  F (37.2  C) Temp src: Oral BP: 119/46 Pulse: 68 Heart Rate: 70 Resp: 16 SpO2: 96 % O2 Device: None (Room air) Oxygen Delivery: 2 LPM  Weight: 249 lbs 1.92 oz  General Appearance: Awake, alert, cooperative, comfortable, oriented x 3  Respiratory: lungs clear to auscultation without rales, rhonchi, wheeze, no respiratory distress   Cardiovascular: regular rate and rhythm, no murmur, no nuno, no rub, mild chest wall tenderness   GI: obese, soft, non-distended, no guarding, no rebound, non-tender   Skin: warm, dry   Other: Patient awake, alert, and oriented x3, answering questions appropriately, no confusion.       Data

## 2019-03-04 NOTE — PROVIDER NOTIFICATION
Time of notification: 9:30 PM  Provider notified: Layton  Patient status: Chronic rico leaking, repositioned multiple times, 16 fr. currently in place. Per patient, 18 fr. rico at home, requesting Lidocaine with reinsertion.   Temp:  [97.9  F (36.6  C)-98.7  F (37.1  C)] 97.9  F (36.6  C)  Heart Rate:  [60-77] 77  Resp:  [16-20] 16  BP: (150-164)/(61-76) 157/76  FiO2 (%):  [21 %] 21 %  SpO2:  [98 %-100 %] 99 %  Orders received: Replace chronic rico and lidocaine jelly ordered. Continue with POC.

## 2019-03-05 ENCOUNTER — APPOINTMENT (OUTPATIENT)
Dept: CT IMAGING | Facility: CLINIC | Age: 74
DRG: 698 | End: 2019-03-05
Payer: MEDICARE

## 2019-03-05 ENCOUNTER — PATIENT OUTREACH (OUTPATIENT)
Dept: CARE COORDINATION | Facility: CLINIC | Age: 74
End: 2019-03-05

## 2019-03-05 VITALS
HEART RATE: 70 BPM | RESPIRATION RATE: 16 BRPM | HEIGHT: 69 IN | TEMPERATURE: 98 F | WEIGHT: 249.12 LBS | DIASTOLIC BLOOD PRESSURE: 58 MMHG | BODY MASS INDEX: 36.9 KG/M2 | SYSTOLIC BLOOD PRESSURE: 166 MMHG | OXYGEN SATURATION: 100 %

## 2019-03-05 LAB
ANION GAP SERPL CALCULATED.3IONS-SCNC: 7 MMOL/L (ref 3–14)
BUN SERPL-MCNC: 34 MG/DL (ref 7–30)
CALCIUM SERPL-MCNC: 8.2 MG/DL (ref 8.5–10.1)
CHLORIDE SERPL-SCNC: 107 MMOL/L (ref 94–109)
CO2 SERPL-SCNC: 24 MMOL/L (ref 20–32)
CREAT SERPL-MCNC: 1.81 MG/DL (ref 0.52–1.04)
ERYTHROCYTE [DISTWIDTH] IN BLOOD BY AUTOMATED COUNT: 14 % (ref 10–15)
GFR SERPL CREATININE-BSD FRML MDRD: 27 ML/MIN/{1.73_M2}
GLUCOSE BLDC GLUCOMTR-MCNC: 148 MG/DL (ref 70–99)
GLUCOSE BLDC GLUCOMTR-MCNC: 184 MG/DL (ref 70–99)
GLUCOSE SERPL-MCNC: 183 MG/DL (ref 70–99)
HCT VFR BLD AUTO: 30.2 % (ref 35–47)
HGB BLD-MCNC: 9 G/DL (ref 11.7–15.7)
INTERPRETATION ECG - MUSE: NORMAL
MAGNESIUM SERPL-MCNC: 2.3 MG/DL (ref 1.6–2.3)
MCH RBC QN AUTO: 29.2 PG (ref 26.5–33)
MCHC RBC AUTO-ENTMCNC: 29.8 G/DL (ref 31.5–36.5)
MCV RBC AUTO: 98 FL (ref 78–100)
PHOSPHATE SERPL-MCNC: 2.9 MG/DL (ref 2.5–4.5)
PLATELET # BLD AUTO: 112 10E9/L (ref 150–450)
POTASSIUM SERPL-SCNC: 4.3 MMOL/L (ref 3.4–5.3)
RADIOLOGIST FLAGS: NORMAL
RBC # BLD AUTO: 3.08 10E12/L (ref 3.8–5.2)
SODIUM SERPL-SCNC: 138 MMOL/L (ref 133–144)
TACROLIMUS BLD-MCNC: 7 UG/L (ref 5–15)
TME LAST DOSE: NORMAL H
WBC # BLD AUTO: 4.3 10E9/L (ref 4–11)

## 2019-03-05 PROCEDURE — 80048 BASIC METABOLIC PNL TOTAL CA: CPT | Performed by: FAMILY MEDICINE

## 2019-03-05 PROCEDURE — 00000146 ZZHCL STATISTIC GLUCOSE BY METER IP

## 2019-03-05 PROCEDURE — A9270 NON-COVERED ITEM OR SERVICE: HCPCS | Mod: GY | Performed by: FAMILY MEDICINE

## 2019-03-05 PROCEDURE — 25000125 ZZHC RX 250: Performed by: FAMILY MEDICINE

## 2019-03-05 PROCEDURE — 25000128 H RX IP 250 OP 636: Performed by: FAMILY MEDICINE

## 2019-03-05 PROCEDURE — 85027 COMPLETE CBC AUTOMATED: CPT | Performed by: STUDENT IN AN ORGANIZED HEALTH CARE EDUCATION/TRAINING PROGRAM

## 2019-03-05 PROCEDURE — 25000132 ZZH RX MED GY IP 250 OP 250 PS 637: Mod: GY | Performed by: FAMILY MEDICINE

## 2019-03-05 PROCEDURE — 80197 ASSAY OF TACROLIMUS: CPT | Performed by: FAMILY MEDICINE

## 2019-03-05 PROCEDURE — 36415 COLL VENOUS BLD VENIPUNCTURE: CPT | Performed by: FAMILY MEDICINE

## 2019-03-05 PROCEDURE — 25000131 ZZH RX MED GY IP 250 OP 636 PS 637: Mod: GY | Performed by: INTERNAL MEDICINE

## 2019-03-05 PROCEDURE — 83735 ASSAY OF MAGNESIUM: CPT | Performed by: FAMILY MEDICINE

## 2019-03-05 PROCEDURE — 74176 CT ABD & PELVIS W/O CONTRAST: CPT

## 2019-03-05 PROCEDURE — 84100 ASSAY OF PHOSPHORUS: CPT | Performed by: FAMILY MEDICINE

## 2019-03-05 RX ORDER — SULFAMETHOXAZOLE AND TRIMETHOPRIM 400; 80 MG/1; MG/1
1 TABLET ORAL DAILY
Qty: 90 TABLET | Refills: 0 | Status: SHIPPED | OUTPATIENT
Start: 2019-03-05 | End: 2019-04-22 | Stop reason: ALTCHOICE

## 2019-03-05 RX ORDER — CIPROFLOXACIN 500 MG/1
500 TABLET, FILM COATED ORAL 2 TIMES DAILY
Qty: 20 TABLET | Refills: 0 | Status: ON HOLD | OUTPATIENT
Start: 2019-03-05 | End: 2019-04-27

## 2019-03-05 RX ORDER — AMLODIPINE BESYLATE 5 MG/1
5 TABLET ORAL DAILY
Qty: 60 TABLET | Refills: 0 | Status: SHIPPED | OUTPATIENT
Start: 2019-03-06 | End: 2019-05-03

## 2019-03-05 RX ORDER — DIPHENOXYLATE HCL/ATROPINE 2.5-.025MG
1-2 TABLET ORAL 4 TIMES DAILY PRN
Qty: 90 TABLET | Refills: 0 | Status: SHIPPED | OUTPATIENT
Start: 2019-03-05 | End: 2019-06-25

## 2019-03-05 RX ADMIN — LORATADINE 10 MG: 10 TABLET ORAL at 09:09

## 2019-03-05 RX ADMIN — HEPARIN SODIUM 5000 UNITS: 5000 INJECTION, SOLUTION INTRAVENOUS; SUBCUTANEOUS at 13:17

## 2019-03-05 RX ADMIN — GABAPENTIN 600 MG: 300 CAPSULE ORAL at 09:08

## 2019-03-05 RX ADMIN — DIPHENOXYLATE HYDROCHLORIDE AND ATROPINE SULFATE 2 TABLET: 2.5; .025 TABLET ORAL at 10:21

## 2019-03-05 RX ADMIN — AMLODIPINE BESYLATE 5 MG: 5 TABLET ORAL at 09:09

## 2019-03-05 RX ADMIN — PREDNISONE 5 MG: 5 TABLET ORAL at 09:09

## 2019-03-05 RX ADMIN — Medication 150 MG: at 09:09

## 2019-03-05 RX ADMIN — Medication 1 CAPSULE: at 09:09

## 2019-03-05 RX ADMIN — LEVOTHYROXINE SODIUM 50 MCG: 25 TABLET ORAL at 09:09

## 2019-03-05 RX ADMIN — TACROLIMUS 2 MG: 1 CAPSULE ORAL at 09:09

## 2019-03-05 RX ADMIN — LEVETIRACETAM 750 MG: 750 TABLET, FILM COATED ORAL at 09:09

## 2019-03-05 ASSESSMENT — ENCOUNTER SYMPTOMS
DYSURIA: 0
CHILLS: 0
ABDOMINAL PAIN: 0
NAUSEA: 0
FREQUENCY: 0
FLANK PAIN: 0
CONFUSION: 0
VOMITING: 0
SHORTNESS OF BREATH: 0
FEVER: 0

## 2019-03-05 ASSESSMENT — ACTIVITIES OF DAILY LIVING (ADL)
ADLS_ACUITY_SCORE: 27

## 2019-03-05 NOTE — PROGRESS NOTES
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  Tac level improved to 6.7 today.   ROB resolved   - CR down to base line 1.8     # Sepsis bacteremia UTI  - Urine culture likely Ecoli, Blood c/s   - abx per primar team  - Bactrim is listed as an allergy but it is not a true allergy.  The side effect listed is elevated creatinine (2016).  I think it is a good idea to restart bactrim after her antibiotics are completed for sepsis as a prophylactic measure against the urinary tract infections.  The true cause of her elevated creatinine in 2016 was related to FSGS (not bactrim related changes).  - cont abx for complicated UTI and need prolonged course 14days total    - ok to start bactrim renally dose       # tacrolimus toxicity: improved today down to 6.7  -- Restart tacrolimus at 2 mg bid.   Resolved now     # Immunosuppression: Tacrolimus immediate release (goal  4-6) and Prednisone (dose  5 mg daily)   - Changes: No,    # Hypertension/Volume Status: Inadequate control; Goal BP: < 140/90   - Changes: No    - Continue to increase hydralazine as tolerated   - will cont holding home  losartan now until Cr is lower to base line   - can start losartan back at home     # Diabetes: Controlled    # Anemia in chronic renal disease: Hgb: Stable   - Iron studies: Not checked recently    # Mineral Bone Disorder:    - Secondary renal hyperparathyroidism; PTH level is: Not checked recently  - Vitamin D; level is: Not checked recently  - Calcium; level is: Normal  - Phosphorus; level is:  "Normal    # Electrolytes:   - Potassium; level: Normal,  Resolved now hyperkalemia likley RTA tacrolimus overdose.  - Magnesium; level: Normal  - Bicarbonate; level: Normal      # Diarrhea: complaining of loose stool,hx of C-Diff in the past now s/p broad spectrum abx will recommend check C-diff.     # Transplant History:  Etiology of kidney failure: diabetic nephropathy  Tx: LDKT  Transplant: 10/27/1999 (Kidney)  Donor Type: Living Donor Class:   Significant changes in immunosuppression: None  Significant transplant-related complications: recurrent UTIs    Recommendations were communicated to the primary team via this note. And pager     Seen and discussed with Dr Aida Carpio MD  Pager: [phone]    Interval History    No new complaint feels fine     Review of Systems   4 point ROS was obtained and negative except as noted in the interval history    Physical Exam   Temp  Av.8  F (37.1  C)  Min: 96.8  F (36  C)  Max: 101.4  F (38.6  C)      Pulse  Av.5  Min: 67  Max: 88 Resp  Av  Min: 8  Max: 20  SpO2  Av.6 %  Min: 83 %  Max: 100 %     /58   Pulse 70   Temp 98  F (36.7  C) (Oral)   Resp 16   Ht 1.753 m (5' 9\")   Wt 113 kg (249 lb 1.9 oz)   SpO2 100%   BMI 36.79 kg/m     Date 19 0700 - 19 0659   Shift 1455-1915 9113-5271 9424-6496 24 Hour Total   INTAKE   I.V. 200   200   Shift Total(mL/kg) 200(1.78)   200(1.78)   OUTPUT   Urine 300   300   Shift Total(mL/kg) 300(2.67)   300(2.67)   Weight (kg) 112.5 112.5 112.5 112.5      Admit Weight: 108.9 kg (240 lb 1.6 oz)   GENERAL APPEARANCE: alert and no distress, emotional labile  HENT: mouth without ulcers or lesions  LYMPHATICS: no cervical or supraclavicular nodes  RESP: lungs clear to auscultation - no rales, rhonchi or wheezes  CV: regular rhythm, normal rate, no rub, no murmur  EDEMA: no LE edema bilaterally  ABDOMEN: soft, nondistended, nontender, bowel sounds normal  MS: extremities normal - no gross deformities " noted, no evidence of inflammation in joints, no muscle tenderness  SKIN: no rash    Data   All labs reviewed by me.  CMP  Recent Labs   Lab 03/05/19 0558 03/04/19 0432 03/03/19 2103 03/03/19 0526 02/28/19  0035    141 139 141   < > 135   POTASSIUM 4.3 4.2 4.5 4.1   < > 6.3*  6.4*   CHLORIDE 107 107 107 110*   < > 108   CO2 24 25 23 24   < > 23   ANIONGAP 7 9 8 7   < > 5   * 220* 195* 112*   < > 90   BUN 34* 39* 39* 42*   < > 70*   CR 1.81* 1.93* 1.97* 2.05*   < > 2.27*   GFRESTIMATED 27* 25* 25* 24*   < > 21*   GFRESTBLACK 32* 29* 29* 27*   < > 24*   ROGER 8.2* 8.4* 8.4* 8.2*   < > 8.6   MAG 2.3 2.2 2.1 2.2   < >  --    PHOS 2.9 2.9 2.9 2.8   < >  --    PROTTOTAL  --   --   --   --   --  7.8   ALBUMIN  --   --   --   --   --  3.2*   BILITOTAL  --   --   --   --   --  0.4   ALKPHOS  --   --   --   --   --  103   AST  --   --   --   --   --  31   ALT  --   --   --   --   --  31    < > = values in this interval not displayed.     CBC  Recent Labs   Lab 03/05/19 0558 03/04/19 0432 03/03/19 0526 03/02/19  0330   HGB 9.0* 8.7* 9.0* 8.8*   WBC 4.3 5.1 6.3 7.1   RBC 3.08* 2.99* 3.06* 2.97*   HCT 30.2* 29.5* 29.8* 28.9*   MCV 98 99 97 97   MCH 29.2 29.1 29.4 29.6   MCHC 29.8* 29.5* 30.2* 30.4*   RDW 14.0 14.0 14.3 14.6   * 119* 114* 107*     INR  Recent Labs   Lab 02/28/19  0035   INR 1.09     ABG  Recent Labs   Lab 02/28/19  0612 02/28/19  0336   O2PER 96.0 95.0      Urine Studies  Recent Labs   Lab Test 02/28/19  2200 02/28/19  0215 02/27/19  1900 01/24/19 2010 01/11/18  1614 12/19/17  1620 11/13/17  1646   COLOR Light Yellow Yellow Yellow Light Yellow   < > Yellow Pink Yellow   APPEARANCE Slightly Cloudy Cloudy Slightly Cloudy Cloudy   < > Turbid Slightly Cloudy Turbid   URINEGLC 70* Negative Negative 30*   < > 100* 100* 500*   URINEBILI Negative Negative Negative Negative   < > Negative Negative Negative   URINEKETONE Negative Negative Negative Negative   < > Negative Negative Negative   SG  1.009 1.015 1.010 1.012   < > 1.025 1.015 1.015   UBLD Moderate* Large* Large* Moderate*   < > Large* Large* Moderate*   URINEPH 5.0 5.5 6.0 5.5   < > 6.0 6.5 6.5   PROTEIN 30* 100* 100* 100*   < > >=300* 100* 100*   UROBILINOGEN  --   --   --   --   --  0.2 0.2 0.2   NITRITE Negative Positive* Negative Negative   < > Positive* Negative Negative   LEUKEST Large* Large* Large* Large*   < > Large* Large* Small*   RBCU 31 64* 159* 93*   < > 5-10* 25-50* 25-50*   WBCU >182* 1,760* 139* >182*   < > >100* * >100*    < > = values in this interval not displayed.     Recent Labs   Lab Test 10/10/18  1503 09/17/18  2040 04/24/17  1930 10/07/11  1230 09/27/11  1600   UTPG 5.27* 2.96* 15.28* Test canceled by PCU/Clinic  Charge credited PER RADHA FALCON 0.07     PTH  Recent Labs   Lab Test 09/18/18  0627 03/11/17  0859 02/16/17  2303 10/07/11  1215 09/27/11  1600   PTHI 104* 334* 9* Test canceled by PCU/Clinic  Charge credited PER RADHA FALCON 53     Iron Studies  Recent Labs   Lab Test 02/17/17  2358 09/04/16  0815 08/07/16  0750 08/09/12  0819 07/14/12  0720 05/08/12 10/07/11  1215 09/27/11  1600   IRON 50 124 12* 68 154 68 Test canceled by PCU/Clinic  Charge credited CANCELED PER RADHA FALCON CORRECTED ON 10/07 AT 1459: PREVIOUSLY REPORTED AS 69 26*   * 128* 166* 188* 169*  --  Test canceled by PCU/Clinic  Charge credited CANCELED PER RADHA FALCON CORRECTED ON 10/07 AT 1459: PREVIOUSLY REPORTED  227*   IRONSAT 29 97* 7* 36 91*  --  Test canceled by PCU/Clinic  Charge credited CANCELED PER RADHA FALCON CORRECTED ON 10/07 AT 1459: PREVIOUSLY REPORTED AS 30 12*   GIANA 1,069* 2,676* 645*  --  444* 134?  --  72       IMAGING:  All imaging studies reviewed by me.     MEDICATIONS:  No current facility-administered medications for this encounter.      Current Outpatient Medications   Medication     ACETAMINOPHEN PO     [START ON 3/6/2019] amLODIPine (NORVASC) 5 MG tablet     aspirin 81 MG tablet     baclofen  (LIORESAL) 10 MG tablet     blood glucose (ACCU-CHEK COMPACT PLUS) test strip     ciprofloxacin (CIPRO) 500 MG tablet     Cranberry 400 MG TABS     cyanocobalamin (VITAMIN B12) 1000 MCG/ML injection     diphenoxylate-atropine (LOMOTIL) 2.5-0.025 MG tablet     famotidine (PEPCID) 20 MG tablet     gabapentin (NEURONTIN) 300 MG capsule     insulin glargine (LANTUS SOLOSTAR PEN) 100 UNIT/ML pen     insulin pen needle (COMFORT EZ PEN NEEDLES) 31G X 6 MM     labetalol (NORMODYNE) 300 MG tablet     Lactobacillus Rhamnosus, GG, (CULTURELL) capsule     levETIRAcetam (KEPPRA) 750 MG tablet     levothyroxine (SYNTHROID/LEVOTHROID) 50 MCG tablet     lidocaine (XYLOCAINE) 2 % topical gel     loratadine (CLARITIN) 10 MG tablet     losartan (COZAAR) 25 MG tablet     pravastatin (PRAVACHOL) 10 MG tablet     predniSONE (DELTASONE) 5 MG tablet     sulfamethoxazole-trimethoprim (BACTRIM/SEPTRA) 400-80 MG tablet     tacrolimus (GENERIC EQUIVALENT) 1 MG capsule     VITAMIN D, CHOLECALCIFEROL, PO     hydrocortisone 1%/eucerin 1% compounded cream     insulin aspart (NOVOLOG FLEXPEN) 100 UNIT/ML pen     ondansetron (ZOFRAN ODT) 4 MG ODT tab     order for DME     order for DME     simethicone (MYLICON) 125 MG CHEW     Patient was seen and evaluated by me, Matt Parra MD. I have reviewed the note and agree with the the plan of care as documented by the fellow.

## 2019-03-05 NOTE — PROGRESS NOTES
Transfer  Transferred to: 5A  Via:bed  Reason for transfer:Pt no longer appropriate for 6B- improved patient condition  Belongings: Packed and sent with pt  Chart: Delivered with pt to next unit  Medications: Meds sent to new unit with pt  Report given to: Sherly SAPP  Pt status: A/Ox4, breathing comfortably on RA, Rocephin infusing via PIV.

## 2019-03-05 NOTE — PROGRESS NOTES
[facility]    Transplant Infectious Diseases Inpatient Progress note      Gem Jade MRN# [id number]   YOB: 1945 Age: 73 year old   Date of Admission: 2/28/2019 12:30 AM  Transplant: 10/27/1999 (Kidney), Postoperative day: 7069            Recommendations:   1.Transition to Cipro on discharge (500mg BID, renally dosed) for total of 2-week course (including the CTX) for UTI  2. Can discharge on Bactrim single strength every day for UTI prophylaxis.  2. CT abdomen and pelvis without contrast shows no evidence of nephrolithiasis and renal abscess that could serve as nidus of infection, however slowly enlarging renal nodule noted on native kidney concerning for potential neoplasm. Patient notified, but would like her primary provider made aware of this finding.     Transplant ID will sign off on this patient, but do no hesitate to contact us should any questions arise. Thank you for allowing us to partake in the care of this patient.    Mago Payne MD   Internal Medicine, PGY-1  Pager: [phone]        Summary of Presentation:   Transplants:  10/27/1999 (Kidney), Postoperative day:  7069     This patient is a 73 year old female with PMH kidney transplant 1999 now on maintenance Tacrolimus and recurrent UTIs 2/2 chronic rico who presented with altered mental status likely secondary to urosepsis, complicated by E. Coli bacteremia, treated with IV CTX with marked improvement.        Active Problems and Infectious Diseases Issues:      1. E. Coli Bacteremia 2/2 E.Coli Urosepsis  1/2 blood cultures on admission positive for E. Coli, likely urinary source given urine culture positive for E.Coli. History of E.Coli bacteremia 2/2 urosepsis in 5/2018. Cultures pan-susceptible. Will treat for two-week course with subsequent follow-up with ID outpatient. Okay to discharge on oral Ciprofloxacin given IV-PO formulations have equal, excellent bioavailability (has allergy to  Levofloxacin, but has tolerated Ciprofloxacin in the past). QTc 439 on 3/4. Would treat for a total of 2 weeks (including CTX).     2. Recurrent UTIs  Patient has extensive history of positive urine cultures (E.Coli, Candida, Citrobacter) over the past year, with admissions in 9/2018, 7/2018, and 5/2018 with concerns for UTIs. Guidelines on recurrent UTI prophylaxis can be nebulous. Typically, 3-4 UTIs in a one-year period can warrant discussion on UTI prophylaxis. However, it can be difficult in chronic rico patients to determine true UTIs from BPCUS or ABPUS. Patient states she is relatively asymptomatic when she has positive urine cultures. Does state she used to be on low-dose Bactrim daily for prophylaxis and is not sure why this was discontinued. She was seen by Urology in the past for recurrent UTIs, and was supposed to follow-up outpatient, however patient states she does not get any outpatient treatment due to transportation barriers (must get ambulance ride to any healthcare provider). Okay to start single strength Bactrim daily for UTI prophylaxis once Cipro course is completed.    3. Diarrhea  Patient reported new onset diarrhea that started over the weekend of 3/1. States she does typically get diarrhea when treated with IV abx. Has history of +C.Diff in 2014. No leukocytosis or abdominal pain, C.Diff negative.        Old Problems and Infectious Diseases Issues:       Other Infectious Disease issues include:  - QTc: 502 on 3/1  - PCP prophylaxis: None  - Serostatus: CMV D-/R-, EBV D-/R-, HSV1-/2-  - Immunization status: This patient last received pneumovax in 6/2013, but CMS recommends an extra [id number] for immunocompromised patients anyway. [Adults aged ?19 years with immunocompromising conditions, who previously have received ?1 doses of PPSV23 should be given a PCV13 dose ?1 year after the last PPSV23 dose was received.]  - Gamma globulin status: IgG 1,530 (4/25/2017)      Interim History and  Events:   No acute events overnight. Patient seen and examined at bedside. Feels well today. Denies any fevers, chills, abdominal pain. Still endorsing diarrhea. Going for CT scan abdomen today. Patient is eager for discharge.    HPI:  Ms. Jade is a 73 year-old female with PMH kidney transplant 1999, T2DM, and recurrent UTIs 2/2 indwelling rico catheter who presented to ED on 2/28 for altered mental status concerning for UTI. ID consulted for E.Coli bacteremia treatment as well as question on recurrent UTI prophylaxis.      Patient has history of UTIs. 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 Most recently, she was admitted in September 2018 for AMS and urosepsis, at that time UCx were positive for E.Coli and BCx were negative. She was initiated on Cefepime, de-escalated to Ceftriaxone, and ultimately discharged on a 14-day course of Cefpodoxime.      Patient was altered when evaluated today, and history obtained from EMR and assistance from nursing. Per EMR, patient started developing confusion on 2/27. Home RN came to home and performed UA and  was awaiting results but decided to bring patient to ED given increasing confusion. She was given 2L IVF and started on Vanc/Zosyn. She was found to have elevated potassium at 6.4, ROB with creatinine at 2.26 (baseline 1.6), and leukocytosis of 11.4. Her UA showed large LE, blood, with 139 WBC and 159 RBCs. BCx were taken on admission and 1/2 bottles turned positive at ~15 hours for E.Coli on Verigene.      Overnight, Tacrolimus gtt was initiated at intended rate of 2.8mL/hr, but due to pump dysfunction, she received total 250mL over one hour, equating to over 7.5x her daily dose of Tacrolimus. Nephro was emergently contacted and supportive plan of care in place. Did develop some delirium overnight, and appears emotionally labile today. Today, creatinine is slowly rising, K is  stable, and patient remains on bicarb gtt.      ROS:  As mentioned in the interim history otherwise negative by reviewing constitutional symptoms, central and peripheral neurological systems, respiratory system, cardiac system, GI system,  system, musculoskeletal, skin, allergy, and lymphatics.                 Pysical Examination:  Temp: 98  F (36.7  C) Temp src: Oral BP: 166/58 Pulse: 70 Heart Rate: 68 Resp: 16 SpO2: 100 % O2 Device: BiPAP/CPAP Oxygen Delivery: 4 LPM  Vitals:    02/28/19 0200 03/01/19 0400 03/03/19 0300   Weight: 108.9 kg (240 lb 1.6 oz) 112.5 kg (248 lb 0.3 oz) 113 kg (249 lb 1.9 oz)       Constitutional: awake, alert, cooperative, no apparent distress and appears at stated age, well nourished. A&Ox4.  Head, ENT, Eyes, and Neck: Normocephalic, external ears without lesions, moist buccal mucosa without oral thrush, EOMI, pink conjunctivae, non-icteric sclera.   Neck supple without rigidity, no cervical/axillary/inguinal LA bilaterally.    Neurologic: Patient is moving all extremities without focal deficit, no focal sensory loss.   Lungs: CTA bilaterally, no accessory muscle use  CVS: RRR, normal S1/S2, no murmur, PMI was not displaced.   Abdomen: non-tender, non-distended, no masses, no shifting dullness, normal BS.   Genitalia: rico catheter in place  Extremities: no LE edema, no ulcers, normal ROM of all joints, no swelling or erythema of any of joints and no tenderness to palpation.   Skin: warm, dry, no rashes    Scheduled Medications:     amLODIPine  5 mg Oral Daily     cefTRIAXone  2 g Intravenous Q24H     gabapentin  600 mg Oral BID     heparin  5,000 Units Subcutaneous Q12H     insulin aspart  1-12 Units Subcutaneous 4x Daily AC & HS     insulin glargine  24 Units Subcutaneous At Bedtime     labetalol  150 mg Oral BID     lactobacillus rhamnosus (GG)  1 capsule Oral BID     levETIRAcetam  750 mg Oral BID     levothyroxine  50 mcg Oral Daily     loratadine  10 mg Oral Daily     predniSONE   5 mg Oral Daily     tacrolimus  2 mg Oral BID IS       Laboratory Data:   Absolute CD4   Date Value Ref Range Status   02/17/2017 379 (L) 441 - 2,156 cells/uL Final       Inflammatory Markers    Recent Labs   Lab Test 03/08/17  1243 08/31/16  0801 05/25/16  2116 02/18/16  1436 04/28/15  1650 12/29/14  1330 10/22/13  1245   SED  --   --  53*  --   --   --   --    CRP 7.5 39.0* <2.9 8.8* 145.0* 114.0* 20.0*       Immune Globulin Studies     Recent Labs   Lab Test 04/25/17  1436   IGG 1,530             Metabolic Studies       Recent Labs   Lab Test 03/05/19  0558 03/04/19  0432 03/03/19  2103 03/03/19  0526 03/02/19  1614  03/02/19  0804  02/28/19  0612 02/28/19  0336  02/28/19  0035  03/09/17  1310  08/07/12  0647    141 139 141 142  --  143   < > 139 137   < > 135   < >  --    < > 135   POTASSIUM 4.3 4.2 4.5 4.1 5.5*  --  5.2   < > 5.5* 6.0*   < > 6.3*  6.4*   < >  --    < > 5.9*   CHLORIDE 107 107 107 110* 113*  --  112*   < > 112* 110*  --  108   < >  --    < > 104   CO2 24 25 23 24 19*  --  24   < > 20 18*  --  23   < >  --    < > 22   ANIONGAP 7 9 8 7 10  --  8   < > 7 8  --  5   < >  --    < > 8   BUN 34* 39* 39* 42* 45*  --  48*   < > 62* 62*  --  70*   < >  --    < > 21   CR 1.81* 1.93* 1.97* 2.05* 1.92*  --  2.10*   < > 2.26* 2.20*  --  2.27*   < >  --    < > 1.49*   GFRESTIMATED 27* 25* 25* 24* 26*  --  23*   < > 21* 22*  --  21*   < >  --    < > 35*   * 220* 195* 112* 118*  --  82   < > 115* 99   < > 90   < >  --    < > 79   A1C  --   --   --   --   --   --   --   --  7.3*  --   --   --    < >  --    < >  --    ROGER 8.2* 8.4* 8.4* 8.2* 7.9*  --  8.2*   < > 8.4* 8.6  --  8.6   < >  --    < > 8.6   PHOS 2.9 2.9 2.9 2.8 3.0   < > 3.6   < > 3.7  --   --   --    < >  --    < >  --    MAG 2.3 2.2 2.1 2.2 1.9   < > 2.4*   < > 2.6*  --   --   --    < >  --    < >  --    LACT  --   --   --   --   --   --   --   --   --  0.6*  --  1.4   < >  --    < >  --    CKT  --   --   --   --   --    --   --   --   --   --   --   --   --  36  --  <20*    < > = values in this interval not displayed.       Hepatic Studies    Recent Labs   Lab Test 02/28/19  0035 09/17/18  1251 09/16/18  0751 09/15/18  2347 07/05/18  1418 05/28/18  0520 05/25/18  0126  09/03/16  1329 08/30/16  1709   BILITOTAL 0.4 0.2  --  0.3 0.3 0.3 0.5   < >  --  0.5   ALKPHOS 103 54  --  71 91 59 88   < >  --  69   ALBUMIN 3.2* 2.6* 2.8* 3.0* 3.5 2.4* 3.0*   < >  --  3.0*   AST 31 6  --  5 27 17 8   < >  --  8   ALT 31 9  --  10 42 14 17   < >  --  8   LDH  --   --   --   --   --   --   --   --  150 157    < > = values in this interval not displayed.       Pancreatitis testing    Recent Labs   Lab Test 11/06/17  1652 04/19/17  0005 12/05/16  1210 02/05/15  1250 09/02/13  0053 06/29/13  1711 05/16/13  1240 06/16/11  1230   LIPASE 99 190  --   --  92 125  --   --    TRIG  --   --  272* 223*  --   --  262* 226*       Hematology Studies      Recent Labs   Lab Test 03/05/19  0558 03/04/19  0432 03/03/19  0526 03/02/19  0330 03/01/19  0545  02/28/19  0612  02/28/19  0035  02/04/19  1252 01/24/19  2005 01/04/19  1300 12/03/18  1215 11/02/18  1230   WBC 4.3 5.1 6.3 7.1 10.9  --  12.8*  --  11.4*   < > 6.0 6.3 5.6 4.5 6.1   ANEU  --   --   --   --   --   --   --   --  10.5*  --  4.0 3.8 3.0 2.0 3.5   ALYM  --   --   --   --   --   --   --   --  0.4*  --  1.4 1.4 1.8 1.7 1.9   CARROLL  --   --   --   --   --   --   --   --  0.5  --  0.4 0.7 0.5 0.5 0.5   AEOS  --   --   --   --   --   --   --   --  0.0  --  0.2 0.3 0.3 0.3 0.3   HGB 9.0* 8.7* 9.0* 8.8* 9.4*  --  9.8*   < > 10.7*   < > 10.8* 10.1* 10.1* 10.3* 10.2*   HCT 30.2* 29.5* 29.8* 28.9* 31.6*  --  33.0*  --  36.5   < > 34.4* 32.0* 32.7* 32.5* 33.2*   * 119* 114* 107* 104*   < > 109*  --  101*   < > 161 129* 165 131* 149*    < > = values in this interval not displayed.       Arterial Blood Gas Testing    Recent Labs   Lab Test 02/28/19  0612 02/28/19  0336 05/28/18  1756 05/25/18  0817  01/21/18  0955  01/19/18  0543 01/18/18  1545   PH  --   --  7.35 7.33* 7.40  --  7.50* 7.43   PCO2  --   --  37 39 35  --  33* 39   PO2  --   --  145* 145* 112*  --  125* 169*   HCO3  --   --  20* 21 21  --  26 26   O2PER 96.0 95.0 3 LPM 30% 30%   < >  --  Ventilator    < > = values in this interval not displayed.        Urine Studies     Recent Labs   Lab Test 02/28/19  2200 02/28/19  0215 02/27/19  1900 01/24/19 2010 01/04/19  1300   URINEPH 5.0 5.5 6.0 5.5 6.0   NITRITE Negative Positive* Negative Negative Negative   LEUKEST Large* Large* Large* Large* Large*   WBCU >182* 1,760* 139* >182* >182*       Vancomycin Levels     Recent Labs   Lab Test 03/01/19  0544 12/20/17  1835 08/29/13  1310   VANCOMYCIN 18.2 20.1 <5.0 Traditional Dosing therapeutic Range:         Trough 8-20 mg/L         Peak 20-50 mg/L       Tobramycin levels     No lab results found.    Gentamicin levels    No lab results found.    Tacrolimus levels    Invalid input(s): TACROLIMUS, TAC, TACR  Transplant Immunosuppression Labs Latest Ref Rng & Units 3/5/2019 3/4/2019 3/3/2019 3/3/2019 3/2/2019   Siro Level 5.0 - 15.0 ug/L - - - - -   Siro Level - - - - - -   Tacro Level 5.0 - 15.0 ug/L - 6.7 - 7.9 -   Tacro Level - - Not Provided - Not Provided -   Myco. Acid Level 1.00 - 3.50 mg/L - - - - -   Creat 0.52 - 1.04 mg/dL 1.81(H) 1.93(H) 1.97(H) 2.05(H) 1.92(H)   BUN 7 - 30 mg/dL 34(H) 39(H) 39(H) 42(H) 45(H)   WBC 4.0 - 11.0 10e9/L 4.3 5.1 - 6.3 -   Neutrophil % - - - - -   ANEU 1.6 - 8.3 10e9/L - - - - -       Cyclosporine levels    Invalid input(s): CYCLOSPORINE, CYC    Mycophenolate levels    Invalid input(s): MYPA, MYP    Sirolimus levels    Invalid input(s): SIROLIMUS, SIR, RAPA    CSF testing     Recent Labs   Lab Test 05/30/18  0845 09/02/16  0940   CWBC Canceled, Test credited  0 0  Duplicate request  Test not indicated.     CRBC Canceled, Test credited  3* 2  Duplicate request  Test not indicated.     CGLU 100* 89*   CTP 48 44          Microbiology:  All cultures:  Recent Labs   Lab 03/01/19  0545 03/01/19  0532 02/28/19  0215 02/28/19  0035 02/28/19  0029 02/27/19  1900   CULT No growth after 4 days No growth after 4 days 50,000 to 100,000 colonies/mL  Escherichia coli  * Cultured on the 1st day of incubation:  Escherichia coli  *  Critical Value/Significant Value, preliminary result only, called to and read back by  Garth Hightower RN on 2.28.19 at 1516. bw    (Note)  POSITIVE for E.COLI by Verigene multiplex nucleic acid test. Final  identification and antimicrobial susceptibility testing will be  verified by standard methods. Verigene test will not distinguish  E.coli from Shigella species including S.dysenteriae, S.flexneri,  S.boydii, and S.sonnei. Specimens containing Shigella species or  E.coli will be reported as Positive for E.coli.    Specimen tested with Verigene multiplex, gram-negative blood culture  nucleic acid test for the following targets: Acinetobacter sp.,  Citrobacter sp., Enterobacter sp., Proteus sp., E. coli, K.  pneumoniae/oxytoca, P. aeruginosa, and the following resistance  markers: CTXM, KPC, NDM, VIM, IMP and OXA.    Critical Value/Significant Value called to and read back by GARTH HIGHTOWER, SEKOU 3774 2.28.19 NDP   No growth after 5 days >100,000 colonies/mL  Escherichia coli  *         Last check of C difficile  C Diff Toxin B PCR   Date Value Ref Range Status   03/04/2019 Negative NEG^Negative Final     Comment:     Negative: Clostridium difficile target DNA sequences NOT detected, presumed   negative for Clostridium difficile toxin B or the number of bacteria present   may be below the limit of detection for the test.  FDA approved assay performed using [employer] GeneXpert real-time PCR.  A negative result does not exclude actual disease due to Clostridium difficile   and may be due to improper collection, handling and storage of the specimen   or the number of organisms in the specimen is below the detection limit  of the   assay.         Virology:    No results found for: H6RES    EBV DNA Copies/mL   Date Value Ref Range Status   09/18/2018 32,906 (A) EBVNEG^EBV DNA Not Detected [Copies]/mL Final   04/20/2017 61,922 (A) EBVNEG [Copies]/mL Final   03/10/2017 89,252 (A) EBVNEG [Copies]/mL Final   02/17/2017 139,026 (A) EBVNEG [Copies]/mL Final   08/30/2016 2,393 (A) EBVNEG [Copies]/mL Final   05/28/2016 9,498 (A) EBVNEG [Copies]/mL Final       BK viral loads   Recent Labs   Lab Test 04/20/17  2021 03/09/17  1310 02/17/17  2358 10/12/12  0713   BKSPEC Plasma Plasma, EDTA anticoagulant  CORRECTED ON 03/09 AT 1319: PREVIOUSLY REPORTED AS Plasma   Plasma Plasma, EDTA anticoagulant   BKRES BK Virus DNA Not Detected BK Virus DNA Not Detected BK Virus DNA Not Detected <1000         Imaging:  CT Abdomen 3/5:  IMPRESSION:   1. Right lower quadrant renal transplant with urothelial thickening in  the renal pelvis and fat stranding adjacent to the renal pelvis and  transplant ureter concerning for potential infection. No urinary tract  calculi.  2. Slowly increasing size of an exophytic nodule along the anterior  native right kidney concerning for potential neoplasm. Consider  evaluation with noncontrast MRI to exclude hemorrhagic/proteinaceous  cyst.  3. Grossly unchanged lesion centered in the body of the pancreas,  difficult to tell if this represents a sidebranch or main duct IPMN.  This can also be further evaluated with noncontrast MRI/MRCP.  4. Osteoporosis with multiple age indeterminate associated compression  deformities from T12 to L3.  5. Bibasilar pulmonary calcifications, possibly dendriform  calcifications from chronic aspiration or metastatic pulmonary  calcifications from prior renal failure.  6. Trace pleural effusions.

## 2019-03-05 NOTE — PLAN OF CARE
VSS on RA. Cpap at night. A/Ox4. Denies pain/nausea. Pt admitted on 2/28 for sepsis secondary to UTI/bacteremia. Transferred from  at 2200. PIV TKO for intermittent abx. Regular diet. Voiding with rico. Had small BM on 6B before transfer. Sent for enteric panel. Possible discharge tomorrow. Continue to monitor and follow POC.

## 2019-03-05 NOTE — PLAN OF CARE
Time: 8023-1872    Reason for admission: Sepsis     Neuro: A&Ox4. Neuros intact. Numbness/tingling present at BL.  Cardiac: WNL ex: HTN   Respiratory: Sating >92% on 4L + CPAP overnight. LS clear/diminished.   GI/: Chronic rico, adequate UOP. PRN PO Lomotil given x1.  Diet/appetite: Tolerating regular diet. Eating well. Denies N/V.  Activity: Assist of 2, repositioned q4hrs overnight.   Pain: Denied overnight.  Skin: No new deficits noted.  LDA's: L PIV is SL      Plan: Pt can discharge when home antibiotics are figured out. Will continue with POC. Notify primary team with changes.

## 2019-03-05 NOTE — PROGRESS NOTES
Pt discharging home via [facility] transportation ride. PIV removed. Patient will go home with indwelling rico catheter. RN went over discharge paperwork with patient at beside and helped her gather up and pack belongings. P[place] meds at [place] pharmacy and sent with patient and transporters. Adequate for discharge.    Elly Osborne, RN on 3/5/2019 at 2:40 PM

## 2019-03-06 ENCOUNTER — TELEPHONE (OUTPATIENT)
Dept: FAMILY MEDICINE | Facility: CLINIC | Age: 74
End: 2019-03-06

## 2019-03-06 LAB
BACTERIA SPEC CULT: NO GROWTH
Lab: NORMAL
SPECIMEN SOURCE: NORMAL

## 2019-03-06 NOTE — TELEPHONE ENCOUNTER
Radha from [facility] called and stated that incidental finding was discovered-   1. Right lower quadrant renal transplant with urothelial thickening in  the renal pelvis and fat stranding adjacent to the renal pelvis and  transplant ureter concerning for potential infection. No urinary tract  calculi.  2. Slowly increasing size of an exophytic nodule along the anterior  native right kidney concerning for potential neoplasm. Consider  evaluation with noncontrast MRI to exclude hemorrhagic/proteinaceous  Cyst.    Routing to provider to review result in its entirety and specifically point out the incidental finding as called to me.  Nanci Wesley RN

## 2019-03-06 NOTE — PROGRESS NOTES
[facility]: Post-Discharge Note  SITUATION                                                      Admission:    Admission Date: 02/28/19   Reason for Admission: Severe sepsis  Discharge:   Discharge Date: 03/05/19  Discharge Diagnosis: Severe sepsis  Discharge Service: Family Medicine     BACKGROUND                                                      Gem Jade is a 73 year old female with a past medical history significant for DM2, polyneuropathy, kidney transplant (1999) and recurrent UTIs secondary to indwelling rico complicated by toxic encephalopathy who presents to the Emergency Department today via EMS with an altered mental status. Patient has been unresponsive at times since 12 PM (2/27/19).     Chart review reveals there is been concern of UTI for several days and urinalysis had been sent with anticipated oncoming treatment course.  The patient worsened while at home, became warm according to her partner until sent for evaluation after she continued to worsen today.    ASSESSMENT      Discharge Assessment  Patient reports symptoms are: Unchanged  Does the patient have all of their medications?: Yes  Does patient know what their new medications are for?: Yes  Does patient have a follow-up appointment scheduled?: Yes  Does patient have any other questions or concerns?: No    Post-op  Did the patient have surgery or a procedure: No  Fever: No  Chills: No  Eating & Drinking: eating and drinking without complaints/concerns  Bowel Function: normal  Urinary Status: voiding without complaint/concerns    PLAN                                                      Outpatient Plan:      PCP within 7 days for BMP, CBC, and tacrolimus level     Future Appointments   Date Time Provider Department Center   3/18/2019  2:00 PM Marivel Barcenas, ANTHONY CNP [facility] [place], [facility]

## 2019-03-07 ENCOUNTER — PATIENT OUTREACH (OUTPATIENT)
Dept: CARE COORDINATION | Facility: CLINIC | Age: 74
End: 2019-03-07

## 2019-03-07 ENCOUNTER — TELEPHONE (OUTPATIENT)
Dept: FAMILY MEDICINE | Facility: CLINIC | Age: 74
End: 2019-03-07

## 2019-03-07 DIAGNOSIS — T83.511D URINARY TRACT INFECTION ASSOCIATED WITH INDWELLING URETHRAL CATHETER, SUBSEQUENT ENCOUNTER: Primary | ICD-10-CM

## 2019-03-07 DIAGNOSIS — N39.0 URINARY TRACT INFECTION ASSOCIATED WITH INDWELLING URETHRAL CATHETER, SUBSEQUENT ENCOUNTER: Primary | ICD-10-CM

## 2019-03-07 DIAGNOSIS — R41.82 ALTERED MENTAL STATUS, UNSPECIFIED ALTERED MENTAL STATUS TYPE: ICD-10-CM

## 2019-03-07 NOTE — TELEPHONE ENCOUNTER
Reason for Call:  Home Health Care    Khloe with [facility] called regarding:    Orders are needed for this patient.     Skilled Nursing: once a week X 3 weeks and 3 as needed.     : for assessment     Home Health Aide: Once a week X 1 week, then 3 times a week X 4 weeks     Cath Changes: ok to continue with cath changes every 3 weeks and as needed.    Vitamin B12 injection: ok to give monthly     Pt Provider: Klaudia Barcenas    Phone Number Homecare Nurse can be reached at: [phone]    Can we leave a detailed message on this number? YES    Phone number patient can be reached at: Home number on file [phone] (home)    Best Time: anytime     Call taken on 3/7/2019 at 11:05 AM by Rich Henderson

## 2019-03-08 ENCOUNTER — ALLIED HEALTH/NURSE VISIT (OUTPATIENT)
Dept: PHARMACY | Facility: CLINIC | Age: 74
End: 2019-03-08

## 2019-03-08 ENCOUNTER — VIRTUAL VISIT (OUTPATIENT)
Dept: FAMILY MEDICINE | Facility: CLINIC | Age: 74
End: 2019-03-08
Payer: MEDICARE

## 2019-03-08 ENCOUNTER — TELEPHONE (OUTPATIENT)
Dept: TRANSPLANT | Facility: CLINIC | Age: 74
End: 2019-03-08

## 2019-03-08 DIAGNOSIS — N18.30 CHRONIC KIDNEY DISEASE, STAGE III (MODERATE) (H): ICD-10-CM

## 2019-03-08 DIAGNOSIS — Z79.4 TYPE 2 DIABETES MELLITUS WITH DIABETIC POLYNEUROPATHY, WITH LONG-TERM CURRENT USE OF INSULIN (H): ICD-10-CM

## 2019-03-08 DIAGNOSIS — Z09 HOSPITAL DISCHARGE FOLLOW-UP: Primary | ICD-10-CM

## 2019-03-08 DIAGNOSIS — I10 BENIGN ESSENTIAL HYPERTENSION: ICD-10-CM

## 2019-03-08 DIAGNOSIS — L29.9 ITCHING: ICD-10-CM

## 2019-03-08 DIAGNOSIS — E11.42 TYPE 2 DIABETES MELLITUS WITH DIABETIC POLYNEUROPATHY, WITH LONG-TERM CURRENT USE OF INSULIN (H): ICD-10-CM

## 2019-03-08 DIAGNOSIS — R42 VERTIGO: ICD-10-CM

## 2019-03-08 DIAGNOSIS — Z46.6 URINARY CATHETER (FOLEY) CHANGE REQUIRED: ICD-10-CM

## 2019-03-08 DIAGNOSIS — R56.9 SEIZURES (H): ICD-10-CM

## 2019-03-08 DIAGNOSIS — N39.0 COMPLICATED UTI (URINARY TRACT INFECTION): ICD-10-CM

## 2019-03-08 DIAGNOSIS — Z78.9 TAKES DIETARY SUPPLEMENTS: ICD-10-CM

## 2019-03-08 DIAGNOSIS — E78.5 HYPERLIPIDEMIA LDL GOAL <100: ICD-10-CM

## 2019-03-08 DIAGNOSIS — A41.9 SEPSIS, DUE TO UNSPECIFIED ORGANISM: ICD-10-CM

## 2019-03-08 DIAGNOSIS — E03.9 HYPOTHYROIDISM, UNSPECIFIED TYPE: ICD-10-CM

## 2019-03-08 DIAGNOSIS — Z94.0 KIDNEY REPLACED BY TRANSPLANT: ICD-10-CM

## 2019-03-08 DIAGNOSIS — Z94.0 KIDNEY REPLACED BY TRANSPLANT: Primary | ICD-10-CM

## 2019-03-08 DIAGNOSIS — K21.9 GASTROESOPHAGEAL REFLUX DISEASE, ESOPHAGITIS PRESENCE NOT SPECIFIED: ICD-10-CM

## 2019-03-08 LAB
CA-I BLD-SCNC: 4.9 MG/DL (ref 4.4–5.2)
CO2 BLDCOV-SCNC: 23 MMOL/L (ref 21–28)
GLUCOSE BLD-MCNC: 88 MG/DL (ref 70–99)
HCT VFR BLD CALC: 43 %PCV (ref 35–47)
HGB BLD CALC-MCNC: 14.6 G/DL (ref 11.7–15.7)
PCO2 BLDV: 47 MM HG (ref 40–50)
PH BLDV: 7.29 PH (ref 7.32–7.43)
PO2 BLDV: 46 MM HG (ref 25–47)
POTASSIUM BLD-SCNC: 7.1 MMOL/L (ref 3.4–5.3)
SAO2 % BLDV FROM PO2: 77 %
SODIUM BLD-SCNC: 136 MMOL/L (ref 133–144)

## 2019-03-08 PROCEDURE — 99605 MTMS BY PHARM NP 15 MIN: CPT | Performed by: PHARMACIST

## 2019-03-08 PROCEDURE — 99443 ZZC PHYSICIAN TELEPHONE EVALUATION 21-30 MIN: CPT | Performed by: NURSE PRACTITIONER

## 2019-03-08 PROCEDURE — 99607 MTMS BY PHARM ADDL 15 MIN: CPT | Performed by: PHARMACIST

## 2019-03-08 NOTE — Clinical Note
Keshawn Vega asked me to forward to you and would like to follow-up with you in the future to keep things consistent.Romaine Reaves, PharmD, BCACPMedication Therapy Management PharmacistPager: [phone]

## 2019-03-08 NOTE — PATIENT INSTRUCTIONS
We have ordered you some labs and we will have your [facility] draw them, we will let her know     Please take your 10u of novolog 2x daily and then add in sliding scale as needed     Reviewed imaging with you and we will discuss the treatment plan at our next visit, I will contact your nephrology for consult    I will call ID to discuss starting Bactrim prophylaxis, please do not start until you hear from me

## 2019-03-08 NOTE — PROGRESS NOTES
SUBJECTIVE/OBJECTIVE:                Gem Jade is a 73 year old female called for a transitions of care visit.  She was discharged from [facility] on 3/5/19 for severe sepsis. Visit completed with , Chang, who has consent from patient.     Chief Complaint: Hospital Follow-Up.  Personal Healthcare Goals: resolve concerns with tacrolimus overdose/kidney issues    Allergies/ADRs: Reviewed in Epic  Tobacco: History of tobacco dependence - quit 1987   Alcohol: none  Caffeine: no caffeine  Activity: patient is bed bound and suffers from significant vertigo  PMH: Reviewed in Epic    Medication Adherence/Access:  Patient uses pill box(es).  Patient takes medications 2 time(s) per day.   Per patient, misses medication 0 times per week.   Medication barriers: none.   The patient fills medications at [place]: YES.    Hx of Kidney Transplant/Sepsis/UTI: Currently taking tacrolimus 2 mg twice daily and prednisone 5 mg daily. Pt's IV pump malfunctioned and administered tacrolimus as bolus instead of infusion leading to kidney injury.   is upset about lack of communication from transplant team since discharge.  He is not sure if they are aware of events at hospital and is unclear what the plan is for follow-up. It appears that PCP and transplant team have ordered labs with home care, but unclear when these would be drawn. MTM did call out to home care RN after today's visit, but I was only able to leave message.  Recently admitted with E. Coli UTI that progressed into bacteremia/severe sepsis. Currently taking ciprofloxacin 500 mg twice daily. Pt is usually also taking a probiotic twice daily, cranberry 400 mg twice daily, and once complete with ciprofloxacin will be starting Bactrim once daily for UTI prophylaxis. Pt is more clear headed, but is having some diarrhea since starting antibiotics (not severe). They have been managing diarrhea with PRN Lomotil, but have been limiting this to around 2 doses per day.  "Denies any other known side effects.    Diabetes:  Pt currently taking Lantus 24 units daily and Novolog 10 units twice daily + sliding scale (2 units per every 100 mg/dL above 140 mg/dL).  verifies doses and sets up Lantus dose, but patient listens to clicks to administer Novolog. Pt is not experiencing side effects. Dose take gabapentin 600 mg twice daily (Once every 2-3 weeks she will take a day or two of extra 300 mg at bedtime). Finds to be effective.  SMBG: two times daily.   Ranges (patient reported): 100-250 mg/dL  Patient is not experiencing hypoglycemia  Recent symptoms of high blood sugar? none  Eye exam: due  Foot exam: due  ACEi/ARB: No - holding due to recent ROB  Urine Albumin:   Lab Results   Component Value Date    UMALCR 6,776.56 (H) 04/26/2017   Aspirin: Taking 81mg daily and denies side effects    Hypertension: Current medications include amlodipine 5 mg daily and labetalol 150 mg twice daily.  notes that discharge medication list still includes losartan 25 mg daily, but they were told multiple times to continue holding losartan until seen after discharge.  Patient does self-monitor BP.  cannot recall readings - says they are \"good\".  Patient reports no current medication side effects.    Hyperlipidemia: Current therapy includes pravastatin 10 mg once daily.  Pt reports no significant myalgias or other side effects.  The 10-year ASCVD risk score (Angykaleb GAMBOA Jr., et al., 2013) is: 41.6%    Values used to calculate the score:      Age: 73 years      Sex: Female      Is Non- : No      Diabetic: Yes      Tobacco smoker: No      Systolic Blood Pressure: 166 mmHg      Is BP treated: Yes      HDL Cholesterol: 46 mg/dL      Total Cholesterol: 164 mg/dL     Seizure disorder: Currently taking levetiracetam 750 mg twice daily. No known episodes, but  states that she is taking as preventative for suspected seizures. He notes that cognitively she is doing the " best she has for a long time, but now is more aware of her limitations. Denies any known side effects    Hypothyroidism: Patient is taking levothyroxine 50 mcg daily. Patient is having the following symptoms: none.   TSH   Date Value Ref Range Status   05/29/2018 0.85 0.40 - 4.00 mU/L Final     Vertigo: Currently taking ondansetron ODT 4 mg daily PRN (30 minutes before planned movement). Vertigo is very debilitating and has fast onset. Even if she is rolled on her side. Pt finds this to be effective in preventing nausea. Pt reports no known side effects.     Allergic rhinitis: Current medications include loratadine 10 mg once daily. Pt feels that current therapy is effective.     GERD: Current medications include: Pepcid (famotidine) 20 mg twice daily. Pt c/o no current symptoms.  Patient feels that current regimen is effective.    Bladder spasms: Pt will take baclofen 10 mg twice daily PRN. Primarily uses around catheter changes. Denies any known side effects.    Supplements/PRNs: Pt is taking vitamin D3 4,000 units twice daily, simethicone 125 mg daily PRN (gas), and vitamin B12 1,000 mcg daily.     Today's Vitals: There were no vitals taken for this visit. - telephone encounter, no vitals    Lab Results   Component Value Date    A1C 7.3 02/28/2019    A1C 8.2 09/16/2018    A1C 8.8 05/26/2018    A1C 6.3 01/12/2018    A1C 6.7 12/19/2017     ASSESSMENT:                 Current medications were reviewed today.      Medication Adherence: good, no issues identified    Hx of Kidney Transplant/Sepsis/UTI: Improving per patient/. Pt/ would benefit from clarification on follow-up monitoring of kidney function/tacrolimus level as this was still elevated at discharge. Needs Bactrim ordered for starting next week.     Diabetes: Stable. Pt is meeting A1c goal of <8%.     Hypertension: Stable. Patient is meeting BP goal of < 140/90mmHg. Pt should likely remain off losartan as this was stated multiple times during  discharge. Unclear why this was not removed from discharge printout.     Hyperlipidemia: Stable.    Seizure disorder: Stable.     Hypothyroidism: Stable. Last TSH is within normal limits.     Vertigo: Stable/manageable.     Allergic rhinitis: Stable.      GERD: Stable.    Bladder spasms: Stable.    Supplements/PRNs: Stable.     PLAN:                  Post Discharge Medication Reconciliation Status: discharge medications reconciled, continue medications without change.    1. Confirmed with patient that they should continue to hold losartan as stated in discharge summary.  2. Called [facility] to have Bactrim prescription mailed out for patient to start after completing ciprofloxacin.     I spent 40 minutes with this patient today. A copy of the visit note was provided to the patient's primary care provider.    Will follow up in 2-4 weeks. Will forward note to Conchita Toledo PharmD who has worked with patient previously.    The patient was given a summary of these recommendations as an after visit summary.        Romaine Reaves, PharmD, [facility]  Medication Therapy Management Pharmacist  Pager: [phone]

## 2019-03-08 NOTE — PROGRESS NOTES
"Gem Jade is a 73 year old female who is being evaluated via a billable telephone visit.      The patient has been notified of following:     \"This telephone visit will be conducted via a call between you and your physician/provider. We have found that certain health care needs can be provided without the need for a physical exam.  This service lets us provide the care you need with a short phone conversation.  If a prescription is necessary we can send it directly to your pharmacy.  If lab work is needed we can place an order for that and you can then stop by our lab to have the test done at a later time.    If during the course of the call the physician/provider feels a telephone visit is not appropriate, you will not be charged for this service.\"     Consent has been obtained for this service by 1 care team member: yes. See the scanned image in the medical record.    Gem Jade complains of  No chief complaint on file.      I have reviewed and updated the patient's Past Medical History, Social History, Family History and Medication List.    ALLERGIES  Atorvastatin calcium; Codeine; Darvocet [propoxyphene n-apap]; Hydromorphone; Levofloxacin; Morphine; Niaspan [niacin]; Percocet [oxycodone-acetaminophen]; and Vicodin [hydrocodone-acetaminophen]    Hospital Follow-up Visit:    Hospital/Nursing Home/IP Rehab Facility: [facility]  Date of Admission: 2/28/19  Date of Discharge: 3/5/19  Reason(s) for Admission: Kidney replaced by transplant, Diabetes Hypertension, UTI, Sepsis            Problems taking medications regularly:  None       Medication changes since discharge: None, in hospital stopped losartan, no changes in insulin dosing,but only taking Novolog 2x daily 10u plus sliding scale         Problems adhering to non-medication therapy:  None  Summary of hospitalization:  [facility] discharge summary reviewed  Diagnostic Tests/Treatments reviewed.  Follow up needed: labs " needed BMP, CBC and tacrolimus   Other Healthcare Providers Involved in Patient s Care:         Homecare  Update since discharge: fluctuating course. Had some problems with SOB today, used CPAP machine and got some improvement, raised her HOB and that helped, deep breaths helped, was on O2 in the hospital, having some elevated , 200, no sugars under 200 since being home, eating more now, still taking antibiotics, plan is to take an antibiotic prophetically after Cipro       Post Discharge Medication Reconciliation: discharge medications reconciled, continue medications without change.  Plan of care communicated with patient and caregiver     Coding guidelines for this visit:  Type of Medical   Decision Making Face-to-Face Visit       within 7 Days of discharge Face-to-Face Visit        within 14 days of discharge   Moderate Complexity [id number]   High Complexity [id number]            Date of Admission:  2/28/2019  Date of Discharge:  3/5/2019  2:42 PM  Date of Service: 3/5/2019  Discharging Attending Provider: Le Chen MD  Discharge Team: [facility] Service     Discharge Diagnoses  Severe sepsis (H)  Acute renal failure, unspecified acute renal failure type (H)  Hyperkalemia  Urinary tract infection associated with indwelling urethral catheter, initial encounter (H)  Acute kidney injury (H)  Kidney replaced by transplant  Diarrhea, unspecified type  Benign essential hypertension  Complicated UTI (urinary tract infection)   Pyelonephritis of transplant kidney         Follow-ups Needed After Discharge     PCP within 7 days for BMP, CBC, and tacrolimus level         Hospital Course     Gem Jade was admitted on 2/28/2019 for sepsis secondary to e coli UTI and bacteremia.  The following problems were addressed during her hospitalization:     #Acute encephalopathy likely due to sepsis   #Severe Sepsis likely due to urinary tract infection  #Pyelonephritis of transplant kidney   Febrile in  ED with mild leukocytosis, normal lactate. Procal was moderate. Had AMS which has slowly improved with treatment of sepsis and now is A/O x 3 but confused at times. Has a history of recurrent infections with indwelling rioc which was changed in the ED. UC/BC both growing pan sensitive E coli. Transplant ID consulted and recommended ceftriaxone. Transition to ciprofloxacin for a total of 14 days at discharge after initial treatment with vanc and zosyn. She will start bactrim 80/400 mg daily for prophylaxis once finished with cipro course for prophylaxis for future UTIs. CT abd/pelvis ordered on day of discharge to rule out nephrolithiasis or abscess. Showed enlargement of right renal mass seen before (Increased size of the exophytic mildly hyperdense nodule along the anterior cortex of the right native kidney, now measuring 18 mm. Previously this measured 13 mm on 1/18/2018 and 10 mm on 2 2/7/2017). Radiology recommended further imaging. Patient bed bound for last 8 years and unable to make it to any outpatient appt.  is caregiver. Patient and  aware of mass. Will try to get [facility] involved to see if home visits would be possible.      #Tacrolimus overdose  #Hyperkalemia  # LDKT (1999)  Patient received IV tacrolimus 250 ml as bolus, equal to 15 mg PO on 2/28. Home dose is 2 mg BID. Level was elevated on day 1 at 25.3. Had altered mental status but improved and likely related to improving sepsis. 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 Recieved NaHCO3 drip for 48 hrs. EKG showed stable qtc. 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 BG was initially elevated but improved after 24 hours.      #Type II DM  Home regimen is novolog TID with meals and lantus 24 units nightly. She was put on lantus 12U and  increased to HSSI while here due to elevated BG after tacro overdose. Eventually after improved mental status and dietary intake she was increased to 18U and discharged back on her home regimen.      #Hypertension   #paroxysymal A fib  Continue to hold losartan due to worsening renal function. She was resumed on home labetalol and pravastatin. Started amlodopine due to elevated blood pressures on 3/4 after discussing with renal.      #Hypothyroidism  -continued home synthroid     #Neuropathy  -PTA gabapentin  -PTA baclofen     # Discharge Pain Plan:   - Patient currently has NO PAIN and is not being prescribed pain medications on discharge.     Imaging done on renal mass:  IMPRESSION:   1. Right lower quadrant renal transplant with urothelial thickening in  the renal pelvis and fat stranding adjacent to the renal pelvis and  transplant ureter concerning for potential infection. No urinary tract  calculi.  2. Slowly increasing size of an exophytic nodule along the anterior  native right kidney concerning for potential neoplasm. Consider  evaluation with noncontrast MRI to exclude hemorrhagic/proteinaceous  cyst.  3. Grossly unchanged lesion centered in the body of the pancreas,  difficult to tell if this represents a sidebranch or main duct IPMN.  This can also be further evaluated with noncontrast MRI/MRCP.  4. Osteoporosis with multiple age indeterminate associated compression  deformities from T12 to L3.  5. Bibasilar pulmonary calcifications, possibly dendriform  calcifications from chronic aspiration or metastatic pulmonary  calcifications from prior renal failure.  6. Trace pleural effusions.    Hospital Consult Urology:  Recommendations:  1. Discontinue Vancomycin  2. Ok to Transition Zosyn to Ceftriaxone given Verigene results. Likely will be able to switch to PO Cipro on discharge given excellent oral bioavailability (equal to IV), but will de-escalate pending further susceptibility results.  3. Will treat  for a total of two weeks.   4. Follow-up in ID clinic to discuss possible UTI prophylaxis. Will not initiate this admission.     Thank you very much for this consultation. Transplant Infectious Disease will continue to follow with you.     Assessment/Plan:  (Z09) Hospital discharge follow-up  (primary encounter diagnosis)  Comment: stable, on antibiotics now, feeling better, no current UTI sx, needs labs   Plan: **Basic metabolic panel FUTURE anytime, CBC         with platelets and differential, Tacrolimus         level        Will order labs and call with results, call if sx return, will hold on starting Bactrim until after talking to ID      (N18.3) Chronic kidney disease, stage III (moderate) (H)   Comment: followed by nephrology, stable, needs labs  Plan: CBC with platelets and differential        Labs ordered    (Z46.6) Urinary catheter (Johnson) change required  Comment: draining well, no issues   Plan: continue [facility] treatment     (I10) Benign essential hypertension  Comment: low BP's, medication reduced in the hospital, losartan stopped, and labetalol reduced and Norvasc started    Plan: continue to monitor, call if elevated     (E11.42,  Z79.4) Type 2 diabetes mellitus with diabetic polyneuropathy, with long-term current use of insulin (H)  Comment: worse, increase in random sugars, unsure why  Plan: discussed increasing Novolog use to 2x daily with both meals     (Z94.0) Kidney replaced by transplant  Comment: CT and US have revealed a mass on her right native kidney and pancreas, both present for years, kidney mass increasing on right kidney, recommendation is for an MRI to further evaluate,   Plan: Discussion with patient and she is not interested in an MRI if the treatment involves surgery as she is not a candidate for surgery, she will discuss the options with her  and I will discuss with her nephrologist     Patient Instructions   We have ordered you some labs and we will have your [facility] draw them,  we will let her know     Please take your 10u of novolog 2x daily and then add in sliding scale as needed     Reviewed imaging with you and we will discuss the treatment plan at our next visit, I will contact your nephrology for consult    I will call ID to discuss starting Bactrim prophylaxis, please do not start until you hear from me       I have reviewed the note as documented above.  This accurately captures the substance of my conversation with the patient.  Klaudia MCFARLANEP      Total time of call between patient and provider was 30 minutes     Marivel Barcenas, ALISON, APRN CNP

## 2019-03-08 NOTE — TELEPHONE ENCOUNTER
Pt recently hospitalized.     Spoke with Khloe at [facility].  Verbal orders for transplant labs given- weekly labs x 4 weeks.  Pt to f/u with Marivel Barcenas NP within 1 week of hospital discharge.

## 2019-03-08 NOTE — Clinical Note
Please call her [facility] and let her know about the labs, please let her know we are holding losartan and Bactrim

## 2019-03-11 NOTE — PROGRESS NOTES
Clinic Care Coordination Contact    Clinic Care Coordination Contact  OUTREACH    Referral Information:     Hospital follow up       Chief Complaint   Patient presents with     Clinic Care Coordination - Post Hospital        Universal Utilization: frequent hospitalizations     Utilization    Last refreshed: 3/10/2019  9:19 PM:  Hospital Admissions 4           Last refreshed: 3/10/2019  9:19 PM:  ED Visits 0           Last refreshed: 3/10/2019  9:19 PM:  No Show Count (past year) 3              Current as of: 3/10/2019  9:19 PM              Clinical Concerns:  Current Medical Concerns:  Recovering from UTI and altered mental status    Current Behavioral Concerns: none    Education Provided to patient: none      Health Maintenance Reviewed:    Clinical Pathway: None    Medication Management:  Finishing cipro course. May need to start bactrim for UTI prophylaxis     Functional Status:       Living Situation:       Diet/Exercise/Sleep:       Transportation:  NA doesn't leave her home except for emergencies         Psychosocial:        Financial/Insurance:      No concerns     Resources and Interventions:  Current Resources:    ;                         Goals:   Goals        General    Medical (pt-stated)     Notes - Note created  7/11/2018  3:20 PM by Kassandra Khalil, RN    #1-Goal Statement: I will reduce the number of UTI's I experience  Measure of Success: reduction in number of UTI's  Supportive Steps to Achieve: home care nurse to request orders for more frequent catheter changes and increased pericare per week  Barriers: resistant UTI's  Strengths: assistance as above  Date to Achieve By: 8-15-18                Patient/Caregiver understanding: good       Future Appointments              In 1 week Marivel Barcenas APRN [facility], [facility]          Plan: phone visit scheduled with PCP. Need to determine plan for kidney mass and UTI prophylaxis     Porsha Khalil  KARAN  Clinic Care Coordinator  [facility]  [phone]

## 2019-03-11 NOTE — PROGRESS NOTES
[facility] SEKOU Yun notified of lab draws needed & med changes as requested. LM on her VM.  Marivel Klein RN

## 2019-03-12 LAB
ANION GAP SERPL CALCULATED.3IONS-SCNC: 6 MMOL/L (ref 3–14)
BASOPHILS # BLD AUTO: 0 10E9/L (ref 0–0.2)
BASOPHILS NFR BLD AUTO: 0.2 %
BUN SERPL-MCNC: 34 MG/DL (ref 7–30)
CALCIUM SERPL-MCNC: 8.1 MG/DL (ref 8.5–10.1)
CHLORIDE SERPL-SCNC: 110 MMOL/L (ref 94–109)
CO2 SERPL-SCNC: 23 MMOL/L (ref 20–32)
CREAT SERPL-MCNC: 2.48 MG/DL (ref 0.52–1.04)
DIFFERENTIAL METHOD BLD: ABNORMAL
EOSINOPHIL # BLD AUTO: 0.2 10E9/L (ref 0–0.7)
EOSINOPHIL NFR BLD AUTO: 3 %
ERYTHROCYTE [DISTWIDTH] IN BLOOD BY AUTOMATED COUNT: 15.3 % (ref 10–15)
GFR SERPL CREATININE-BSD FRML MDRD: 19 ML/MIN/{1.73_M2}
GLUCOSE SERPL-MCNC: 172 MG/DL (ref 70–99)
HCT VFR BLD AUTO: 27.9 % (ref 35–47)
HGB BLD-MCNC: 8.7 G/DL (ref 11.7–15.7)
IMM GRANULOCYTES # BLD: 0.1 10E9/L (ref 0–0.4)
IMM GRANULOCYTES NFR BLD: 1.1 %
LYMPHOCYTES # BLD AUTO: 1.7 10E9/L (ref 0.8–5.3)
LYMPHOCYTES NFR BLD AUTO: 26.6 %
MCH RBC QN AUTO: 29.4 PG (ref 26.5–33)
MCHC RBC AUTO-ENTMCNC: 31.2 G/DL (ref 31.5–36.5)
MCV RBC AUTO: 94 FL (ref 78–100)
MONOCYTES # BLD AUTO: 0.6 10E9/L (ref 0–1.3)
MONOCYTES NFR BLD AUTO: 9.1 %
NEUTROPHILS # BLD AUTO: 3.7 10E9/L (ref 1.6–8.3)
NEUTROPHILS NFR BLD AUTO: 60 %
NRBC # BLD AUTO: 0 10*3/UL
NRBC BLD AUTO-RTO: 0 /100
PLATELET # BLD AUTO: 173 10E9/L (ref 150–450)
POTASSIUM SERPL-SCNC: 4.8 MMOL/L (ref 3.4–5.3)
RBC # BLD AUTO: 2.96 10E12/L (ref 3.8–5.2)
SODIUM SERPL-SCNC: 139 MMOL/L (ref 133–144)
WBC # BLD AUTO: 6.2 10E9/L (ref 4–11)

## 2019-03-12 PROCEDURE — 80197 ASSAY OF TACROLIMUS: CPT | Performed by: NURSE PRACTITIONER

## 2019-03-12 PROCEDURE — 80048 BASIC METABOLIC PNL TOTAL CA: CPT | Performed by: NURSE PRACTITIONER

## 2019-03-12 PROCEDURE — 85025 COMPLETE CBC W/AUTO DIFF WBC: CPT | Performed by: NURSE PRACTITIONER

## 2019-03-13 LAB
TACROLIMUS BLD-MCNC: 5.4 UG/L (ref 5–15)
TME LAST DOSE: NORMAL H

## 2019-03-15 ENCOUNTER — DOCUMENTATION ONLY (OUTPATIENT)
Dept: TRANSPLANT | Facility: CLINIC | Age: 74
End: 2019-03-15

## 2019-03-15 ENCOUNTER — TELEPHONE (OUTPATIENT)
Dept: TRANSPLANT | Facility: CLINIC | Age: 74
End: 2019-03-15

## 2019-03-15 NOTE — TELEPHONE ENCOUNTER
ISSUE:  Creatinine 2.4, up from baseline.      OUTCOME:   Call placed to pt.  Pt reports today is her last day of abx for her UTI. Pt reports poor hydration. Pt will increase hydration and repeat level next week as ordered.     Pt questions answered, pt v/u.

## 2019-03-18 ENCOUNTER — TELEPHONE (OUTPATIENT)
Dept: FAMILY MEDICINE | Facility: CLINIC | Age: 74
End: 2019-03-18

## 2019-03-19 ENCOUNTER — TELEPHONE (OUTPATIENT)
Dept: FAMILY MEDICINE | Facility: CLINIC | Age: 74
End: 2019-03-19

## 2019-03-19 LAB
ANION GAP SERPL CALCULATED.3IONS-SCNC: 6 MMOL/L (ref 3–14)
BASOPHILS # BLD AUTO: 0 10E9/L (ref 0–0.2)
BASOPHILS NFR BLD AUTO: 0.5 %
BUN SERPL-MCNC: 40 MG/DL (ref 7–30)
CALCIUM SERPL-MCNC: 8.5 MG/DL (ref 8.5–10.1)
CHLORIDE SERPL-SCNC: 111 MMOL/L (ref 94–109)
CO2 SERPL-SCNC: 21 MMOL/L (ref 20–32)
CREAT SERPL-MCNC: 2.26 MG/DL (ref 0.52–1.04)
DIFFERENTIAL METHOD BLD: ABNORMAL
EOSINOPHIL # BLD AUTO: 0.3 10E9/L (ref 0–0.7)
EOSINOPHIL NFR BLD AUTO: 4.6 %
ERYTHROCYTE [DISTWIDTH] IN BLOOD BY AUTOMATED COUNT: 14.9 % (ref 10–15)
GFR SERPL CREATININE-BSD FRML MDRD: 21 ML/MIN/{1.73_M2}
GLUCOSE SERPL-MCNC: 65 MG/DL (ref 70–99)
HCT VFR BLD AUTO: 30.4 % (ref 35–47)
HGB BLD-MCNC: 9.7 G/DL (ref 11.7–15.7)
IMM GRANULOCYTES # BLD: 0 10E9/L (ref 0–0.4)
IMM GRANULOCYTES NFR BLD: 0.2 %
LYMPHOCYTES # BLD AUTO: 1.4 10E9/L (ref 0.8–5.3)
LYMPHOCYTES NFR BLD AUTO: 23.3 %
MCH RBC QN AUTO: 29.2 PG (ref 26.5–33)
MCHC RBC AUTO-ENTMCNC: 31.9 G/DL (ref 31.5–36.5)
MCV RBC AUTO: 92 FL (ref 78–100)
MONOCYTES # BLD AUTO: 0.6 10E9/L (ref 0–1.3)
MONOCYTES NFR BLD AUTO: 9.4 %
NEUTROPHILS # BLD AUTO: 3.6 10E9/L (ref 1.6–8.3)
NEUTROPHILS NFR BLD AUTO: 62 %
NRBC # BLD AUTO: 0 10*3/UL
NRBC BLD AUTO-RTO: 0 /100
PLATELET # BLD AUTO: 157 10E9/L (ref 150–450)
POTASSIUM SERPL-SCNC: 4.5 MMOL/L (ref 3.4–5.3)
RBC # BLD AUTO: 3.32 10E12/L (ref 3.8–5.2)
SODIUM SERPL-SCNC: 138 MMOL/L (ref 133–144)
WBC # BLD AUTO: 5.8 10E9/L (ref 4–11)

## 2019-03-19 PROCEDURE — 80048 BASIC METABOLIC PNL TOTAL CA: CPT | Performed by: FAMILY MEDICINE

## 2019-03-19 PROCEDURE — 85025 COMPLETE CBC W/AUTO DIFF WBC: CPT | Performed by: FAMILY MEDICINE

## 2019-03-19 PROCEDURE — 80197 ASSAY OF TACROLIMUS: CPT | Performed by: FAMILY MEDICINE

## 2019-03-20 LAB
TACROLIMUS BLD-MCNC: 5.7 UG/L (ref 5–15)
TME LAST DOSE: NORMAL H

## 2019-03-21 ENCOUNTER — TELEPHONE (OUTPATIENT)
Dept: TRANSPLANT | Facility: CLINIC | Age: 74
End: 2019-03-21

## 2019-03-21 NOTE — TELEPHONE ENCOUNTER
Creatinine = 2.26 (2.48)  Creatinine has been running higher at 1.9-2.2.  Will send to Provider to review.

## 2019-03-22 ENCOUNTER — TELEPHONE (OUTPATIENT)
Dept: TRANSPLANT | Facility: CLINIC | Age: 74
End: 2019-03-22

## 2019-03-22 DIAGNOSIS — Z94.0 KIDNEY TRANSPLANTED: Primary | ICD-10-CM

## 2019-03-22 DIAGNOSIS — Z48.298 AFTERCARE FOLLOWING ORGAN TRANSPLANT: ICD-10-CM

## 2019-03-22 NOTE — TELEPHONE ENCOUNTER
Aida, MD Artie Augustine Teresa, RN             Very close to baseline   Would make sure not infected and if she has a rico make sure its working and up to date on replacement    Previous Messages      ----- Message -----   From: Melissa Alva RN   Sent: 3/21/2019   9:50 AM   To: Matt Parra MD     Tac at goal. Creatinine remains elevated. Results within patient's baseline.         Spoke to Gem. States her Rico is being replaced every 3 weeks.  States she is asymptomatic when she gets a UTI. One time she had it she wouldn't wake up.  Rechecking Transplant labs. Verbalized understanding and agreement to plan. Orders placed in [place] [employer] [facility].  Called [facility] to set up lab draw.

## 2019-03-26 DIAGNOSIS — E11.9 TYPE 2 DIABETES MELLITUS (H): ICD-10-CM

## 2019-03-27 LAB
ALBUMIN UR-MCNC: 100 MG/DL
ANION GAP SERPL CALCULATED.3IONS-SCNC: 8 MMOL/L (ref 3–14)
APPEARANCE UR: ABNORMAL
BACTERIA #/AREA URNS HPF: ABNORMAL /HPF
BASOPHILS # BLD AUTO: 0 10E9/L (ref 0–0.2)
BASOPHILS NFR BLD AUTO: 0.4 %
BILIRUB UR QL STRIP: NEGATIVE
BUN SERPL-MCNC: 52 MG/DL (ref 7–30)
CALCIUM SERPL-MCNC: 9.1 MG/DL (ref 8.5–10.1)
CHLORIDE SERPL-SCNC: 112 MMOL/L (ref 94–109)
CO2 SERPL-SCNC: 20 MMOL/L (ref 20–32)
COLOR UR AUTO: YELLOW
CREAT SERPL-MCNC: 1.82 MG/DL (ref 0.52–1.04)
DIFFERENTIAL METHOD BLD: ABNORMAL
EOSINOPHIL # BLD AUTO: 0.2 10E9/L (ref 0–0.7)
EOSINOPHIL NFR BLD AUTO: 4.1 %
ERYTHROCYTE [DISTWIDTH] IN BLOOD BY AUTOMATED COUNT: 14.4 % (ref 10–15)
GFR SERPL CREATININE-BSD FRML MDRD: 27 ML/MIN/{1.73_M2}
GLUCOSE SERPL-MCNC: 163 MG/DL (ref 70–99)
GLUCOSE UR STRIP-MCNC: NEGATIVE MG/DL
HCT VFR BLD AUTO: 30.8 % (ref 35–47)
HGB BLD-MCNC: 9.7 G/DL (ref 11.7–15.7)
HGB UR QL STRIP: ABNORMAL
HYALINE CASTS #/AREA URNS LPF: <1 /LPF (ref 0–2)
IMM GRANULOCYTES # BLD: 0 10E9/L (ref 0–0.4)
IMM GRANULOCYTES NFR BLD: 0.4 %
KETONES UR STRIP-MCNC: NEGATIVE MG/DL
LEUKOCYTE ESTERASE UR QL STRIP: ABNORMAL
LYMPHOCYTES # BLD AUTO: 1.2 10E9/L (ref 0.8–5.3)
LYMPHOCYTES NFR BLD AUTO: 20.8 %
MCH RBC QN AUTO: 29.3 PG (ref 26.5–33)
MCHC RBC AUTO-ENTMCNC: 31.5 G/DL (ref 31.5–36.5)
MCV RBC AUTO: 93 FL (ref 78–100)
MONOCYTES # BLD AUTO: 0.5 10E9/L (ref 0–1.3)
MONOCYTES NFR BLD AUTO: 8.5 %
NEUTROPHILS # BLD AUTO: 3.7 10E9/L (ref 1.6–8.3)
NEUTROPHILS NFR BLD AUTO: 65.8 %
NITRATE UR QL: NEGATIVE
NRBC # BLD AUTO: 0 10*3/UL
NRBC BLD AUTO-RTO: 0 /100
PH UR STRIP: 6 PH (ref 5–7)
PLATELET # BLD AUTO: 165 10E9/L (ref 150–450)
POTASSIUM SERPL-SCNC: 4.8 MMOL/L (ref 3.4–5.3)
RBC # BLD AUTO: 3.31 10E12/L (ref 3.8–5.2)
RBC #/AREA URNS AUTO: 8 /HPF (ref 0–2)
SODIUM SERPL-SCNC: 140 MMOL/L (ref 133–144)
SOURCE: ABNORMAL
SP GR UR STRIP: 1.02 (ref 1–1.03)
TRANS CELLS #/AREA URNS HPF: <1 /HPF (ref 0–1)
UROBILINOGEN UR STRIP-MCNC: 0.2 MG/DL (ref 0–2)
WBC # BLD AUTO: 5.6 10E9/L (ref 4–11)
WBC #/AREA URNS AUTO: >100 /HPF (ref 0–5)
YEAST #/AREA URNS HPF: ABNORMAL /HPF

## 2019-03-27 PROCEDURE — 85025 COMPLETE CBC W/AUTO DIFF WBC: CPT | Performed by: FAMILY MEDICINE

## 2019-03-27 PROCEDURE — 87086 URINE CULTURE/COLONY COUNT: CPT | Performed by: INTERNAL MEDICINE

## 2019-03-27 PROCEDURE — 80197 ASSAY OF TACROLIMUS: CPT | Performed by: FAMILY MEDICINE

## 2019-03-27 PROCEDURE — 80048 BASIC METABOLIC PNL TOTAL CA: CPT | Performed by: FAMILY MEDICINE

## 2019-03-27 PROCEDURE — 81001 URINALYSIS AUTO W/SCOPE: CPT | Performed by: FAMILY MEDICINE

## 2019-03-27 PROCEDURE — 87106 FUNGI IDENTIFICATION YEAST: CPT

## 2019-03-27 RX ORDER — PEN NEEDLE, DIABETIC 31 GX5/16"
NEEDLE, DISPOSABLE MISCELLANEOUS
Qty: 100 EACH | Refills: 3 | Status: SHIPPED | OUTPATIENT
Start: 2019-03-27 | End: 2020-01-01

## 2019-03-27 NOTE — TELEPHONE ENCOUNTER
Rx approved and refilled per [facility] refill protocol.     Sallie Perales RN  03/27/19  9:22 AM

## 2019-03-28 LAB
BACTERIA SPEC CULT: ABNORMAL
BACTERIA SPEC CULT: ABNORMAL
SPECIMEN SOURCE: ABNORMAL
TACROLIMUS BLD-MCNC: 5.1 UG/L (ref 5–15)
TME LAST DOSE: 0 H

## 2019-04-03 ENCOUNTER — TELEPHONE (OUTPATIENT)
Dept: FAMILY MEDICINE | Facility: CLINIC | Age: 74
End: 2019-04-03

## 2019-04-03 DIAGNOSIS — G40.909 SEIZURE DISORDER (H): ICD-10-CM

## 2019-04-03 NOTE — TELEPHONE ENCOUNTER
Pt requesting refill of losartan 25mg, didn't see on med list    Thank you!  Conchita [facility]

## 2019-04-03 NOTE — TELEPHONE ENCOUNTER
Spoke with Gem, she thinks this request was a mistake on her husbands part, med was discontinued due to declining renal function & replaced with amlodopine.  Routing to [facility] (appt 4/4/129  Marivel Klein RN

## 2019-04-03 NOTE — TELEPHONE ENCOUNTER
Received call from patient's  asking for clarification on losartan and Bactrim.     He thinks he had been giving her losartan but after looking back at the medications, states there is no bottle for losartan.  He is also clarifying doses of labetalol and amlodipine.     Reviewed notes regarding Bactrim; unclear if patient is supposed to start for prophylaxis.  Deferred further discussion to phone appt scheduled tomorrow.    Conchita Toledo, VivianD, BCACP

## 2019-04-03 NOTE — TELEPHONE ENCOUNTER
Correct, losartan was discontinued and pt should not be taking.    Conchita Toledo, PharmD, BCACP

## 2019-04-04 ENCOUNTER — TELEPHONE (OUTPATIENT)
Dept: TRANSPLANT | Facility: CLINIC | Age: 74
End: 2019-04-04

## 2019-04-04 ENCOUNTER — ALLIED HEALTH/NURSE VISIT (OUTPATIENT)
Dept: PHARMACY | Facility: CLINIC | Age: 74
End: 2019-04-04
Payer: COMMERCIAL

## 2019-04-04 DIAGNOSIS — Z94.0 KIDNEY TRANSPLANTED: Primary | ICD-10-CM

## 2019-04-04 DIAGNOSIS — Z79.4 TYPE 2 DIABETES MELLITUS WITH DIABETIC POLYNEUROPATHY, WITH LONG-TERM CURRENT USE OF INSULIN (H): ICD-10-CM

## 2019-04-04 DIAGNOSIS — Z48.298 AFTERCARE FOLLOWING ORGAN TRANSPLANT: ICD-10-CM

## 2019-04-04 DIAGNOSIS — I10 BENIGN ESSENTIAL HYPERTENSION: ICD-10-CM

## 2019-04-04 DIAGNOSIS — N39.0 COMPLICATED UTI (URINARY TRACT INFECTION): Primary | ICD-10-CM

## 2019-04-04 DIAGNOSIS — E11.42 TYPE 2 DIABETES MELLITUS WITH DIABETIC POLYNEUROPATHY, WITH LONG-TERM CURRENT USE OF INSULIN (H): ICD-10-CM

## 2019-04-04 PROCEDURE — 99606 MTMS BY PHARM EST 15 MIN: CPT | Performed by: PHARMACIST

## 2019-04-04 PROCEDURE — 99607 MTMS BY PHARM ADDL 15 MIN: CPT | Performed by: PHARMACIST

## 2019-04-04 RX ORDER — FLUCONAZOLE 200 MG/1
200 TABLET ORAL DAILY
Qty: 14 TABLET | Refills: 0 | Status: ON HOLD | OUTPATIENT
Start: 2019-04-04 | End: 2019-04-27

## 2019-04-04 NOTE — TELEPHONE ENCOUNTER
"Ross Elizabeth MD Steen, Daniel C, RN Cc: Melissa Alva RN             If she is symptomatic, would recommend fluconazole 200 mg daily x 14 days.  Please have PharmD review meds for interactions.  Will need to decrease tacrolimus dose in half.     Luis    Previous Messages      ----- Message -----   From: Jorgito Jacinto, RN   Sent: 4/3/2019   5:52 PM   To: Ross Elizabeth MD, Melissa Alva, SEKOU   Subject: Candida Albicans UTI                             She just doesn't \"feel right\" but has no symptoms of dysuria. UC not assigned to any MD/NP for review. Her nurse practitioner is on vacation for two weeks.     QUESTION:   Is this something we need to treat?     Please respond to Terrance Alva.           Called  Keshawn to ask how Quincy Jade is and to discuss starting fluconazole.  States Quincy is not feeling well and is angry because her catheter wasn't put in right and came out early this morning. Now there is urine everywhere.  Recommended they call home care but per Keshawn quincy is upset and doesn't want anybody to come right now.Patient with [facility] [phone],  [facility]  [facility] [address] [place] [address] [address] AT [facility] [address] [phone] (Phone)  [phone] (Fax)     fluconazole 200 mg daily x 14 days ordered and sent.    Message sent to PharmD Jhonathan Hoffman for med interactions.          "

## 2019-04-04 NOTE — TELEPHONE ENCOUNTER
[4/4/2019 10:14 AM] Jhonathan Hoffman:   appropriate change in tac.  Also have patient monitor bp. fluconazole could increase amlodipine levels. just need to monitor.    PLAN:  Current dose 2 mg BID.  Once starting Fluconazole, decrease Tacrolimus dose to 1 mg BID.  After finishing 14 days on Fluconazole, increase Tac dose    Per  Keshawn wife had a Prograf overdose in the hospital.   Was given Prograf dose good for 24 hrs.  Explained that the Prograf dose will be decreased in half and will monitor levels.  Prefers another medication that will not interact with the Tac levels.  Made aware will review with Provider.

## 2019-04-04 NOTE — TELEPHONE ENCOUNTER
Medication Refill Request    Medication(s) requested:  levETIRAcetam (KEPPRA) 750 MG tablet     Last Office Visit: 3/8/19  Next Office Visit: 4/22/19  Labs: up to date        Last keppra level drawn on 5/29/18-- elevated at 47  Last office visit note reviewed-- keppra not addressed    Routing to PCP for review-- continuing not addressed in last office visit note    Sallie Perales RN  04/04/19  10:17 AM

## 2019-04-04 NOTE — TELEPHONE ENCOUNTER
She should have remaining refills - 1 year of meds was prescribed last June.    Stacey Iglesias, DO on 4/4/2019 at 2:42 PM

## 2019-04-04 NOTE — TELEPHONE ENCOUNTER
"Requested Prescriptions   Pending Prescriptions Disp Refills     levETIRAcetam (KEPPRA) 750 MG tablet Last Written Prescription Date:  6/6/18  Last Fill Quantity: 60,  # refills: 11   Last office visit: No previous visit found with prescribing provider:     Future Office Visit:   Next 5 appointments (look out 90 days)    Apr 22, 2019  4:00 PM CDT  Telephone Visit with ANTHONY Antonio CNP  [facility] [address]  [address]  [place] [id number] [phone]          60 tablet 11     Sig: Take 1 tablet (750 mg) by mouth 2 times daily    Anti-Seizure Meds Protocol  Failed - 4/3/2019  1:12 PM       Failed - Review Authorizing provider's last note.     Refer to last progress notes: confirm request is for original authorizing provider (cannot be through other providers).         Failed - Normal CBC on file in past 26 months    Recent Labs   Lab Test 03/27/19  1216   WBC 5.6   RBC 3.31*   HGB 9.7*   HCT 30.8*                   Failed - Normal serum creatinine on file in past 26 months    Recent Labs   Lab Test 03/27/19  1216   CR 1.82*            Passed - Recent (12 mo) or future (30 days) visit within the authorizing provider's specialty    Patient had office visit in the last 12 months or has a visit in the next 30 days with authorizing provider or within the authorizing provider's specialty.  See \"Patient Info\" tab in inbasket, or \"Choose Columns\" in Meds & Orders section of the refill encounter.             Passed - Normal ALT or AST on file in past 26 months    Recent Labs   Lab Test 02/28/19  0035   ALT 31     Recent Labs   Lab Test 02/28/19  0035   AST 31            Passed - Normal platelet count on file in past 26 months    Recent Labs   Lab Test 03/27/19  1216                 Passed - Medication is active on med list       Passed - No active pregnancy on record       Passed - No positive pregnancy test " in last 12 months

## 2019-04-04 NOTE — TELEPHONE ENCOUNTER
Reviewed recommendations with Kia at scheduled medication management appointment today.  They understand the instructions and are agreeable to starting fluconazole, will lower tacrolimus by 50% as recommended. Sent Epic message to verify when usual dose of tacrolimus should be restarted.  Per package insert:  Fluconazole-mediated CY inhibition continues for 4 to 5 days after discontinuation because of the long half-life.    Conchita Toledo, PharmD, BCACP   Medication Management Pharmacist  [facility]  [phone]

## 2019-04-04 NOTE — PROGRESS NOTES
SUBJECTIVE/OBJECTIVE:                Gem Jade is a 73 year old female called for a follow-up visit for Medication Therapy Management.  She was referred to me from Marivel Barcenas.     Chief Complaint: Follow up from our visit on 3/8/19.  Concerned about drug interaction with fluconazole and tacrolimus.  Shantelle would also like a copy of her med list with indication.     Tobacco: History of tobacco dependence- quit  Alcohol: not currently using    Medication Adherence/Access:  Patient has assistance with medication administration and setting up boxes ( Keshawn).     UTI: recommended for her to start fluconazole 200mg daily for candida UTI but concerned about drug interaction with tacrolimus since she had accidental overdose and supratherpeutic level of tac during her last hospitalization.   Also wondering if they should start taking Bactrim for prophylaxis.     Hypertension: Currently taking amlodipine 5mg daily, labetalol 150mg twice daily. No side effects reported. Blood pressures are being checked by HCRN and self. Using wrist cuff but often checks it against HCRN cuff and seems to be accurate.   Reports systolic typically 140-150s over diastolic 50-60s.   151/66, 173/74, 146/62, 151/59, 159/63, 110/82    Diabetes:  Pt currently taking Lantus 24u daily and Novolog 10 units + 2-8 units sliding scale with breakfast and dinnert.  Pt is not experiencing side effects.   Sliding scale:   140-239=2 units  240-339=4 units  340-439=6 units  >440=8 units  SMB-3 times daily.   Ranges (pt reported):   200-300s since UTI symptoms started.  Previously BS were running in low 100s and not needing to use sliding scale.   Patient is not experiencing hypoglycemia.  Recent symptoms of high blood sugar? none  Eye exam: due  Foot exam: due  ACEi/ARB: no due to elevated Cr with losartan  Urine Albumin:   Lab Results   Component Value Date    UMALCR 6776.56 (H) 2017      Aspirin: Taking 81mg daily and denies  side effects  Diet/Exercise: started eating oatmeal and fruit for breakfast.   Lab Results   Component Value Date    A1C 7.3 2019    A1C 8.2 2018    A1C 8.8 2018    A1C 6.3 2018    A1C 6.7 2017     Today's Vitals: There were no vitals taken for this visit. - phone call      ASSESSMENT:              Current medications were reviewed today as discussed above.      Medication Adherence: excellent, no issues identified    UTI: needs improvement. Education provided on drug interaction per [employer]: fluconazole doses of up to 200 mg per day can be safely and effectively used in transplant patients if the tacrolimus dose is decreased by 50%.  Fluconazole-mediated CY inhibition continues for 4 to 5 days after discontinuation because of the long half-life (Prod Info DIFLUCAN  oral tablets, oral suspension, intravenous infusion injection, ).  OK to  and start fluconazole as recommended and pt is agreeable. Sent Epic message to the transplant pharmacist to clarify when tac dose should be increased upon completion of fluconazole.     Hypertension: improved.  BP still above goal <140/90.  Considered increasing amlodipine dose however drug interaction per [employer] as follows:  Concomitant use of amlodipine and tacrolimus may result in increased tacrolimus concentrations. Monitor trough tacrolimus concentrations frequently after starting amlodipine therapy and adjust the tacrolimus dosage if appropriate (Prod Info EXFORGE oral tablets, 2015; Prod Info NORVASC  oral tablets, 2015; Prod Info CADUET  oral tablets, 2015). Also monitor the patient for signs of tacrolimus toxicity, including renal dysfunction, cholestasis, and paresthesias.  Will wait for completion of fluconazole before making changes to amlodipine to avoid further increasing risk of supratherapeutic level of tac.     Diabetes: needs improvement. A1c at goal <8%, however recent BS have been elevated with infection.  Will  increase Lantus with elevated BS and instructions provided to reduce dose to baseline once her BS improves.       PLAN:                  1.  and start fluconazole as directed  2. Decrease tacrolimus to 1mg BID as directed. Sent Epic message to transplant pharmacist to clarify when to restart usual dosing of 2mg BID.   3. Increase Lantus to 30u daily with UTI and decrease back to 24u daily once majority of BS are back in the 100s or with any hypoglycemia.    I spent 45 minutes with this patient today. All changes were made via collaborative practice agreement with Marivel Barcenas. A copy of the visit note was provided to the patient's primary care provider.     Will follow up in 1 month.    The patient was mailed a summary of these recommendations as an after visit summary.    Conchita Toledo, PharmD, BCACP

## 2019-04-04 NOTE — TELEPHONE ENCOUNTER
"Patient's  called patient's primary care clinic with concerns about medication prescribed for UTI-- states he was called \"very early\" and vaguely remembers someone calling and telling him they found someone to read the results and that a medication would be sent to the pharmacy for her, but that this medication would raise her tacro levels, which he is concerned about due to her recent tacro overdose.  would like to discuss these concerns with someone and possibly switch to a different antibiotic.     Routing to transplant team to reach out to patient's .      Sallie Perales RN  04/04/19  10:57 AM      "

## 2019-04-05 RX ORDER — LEVETIRACETAM 750 MG/1
750 TABLET ORAL 2 TIMES DAILY
Qty: 60 TABLET | Refills: 11 | OUTPATIENT
Start: 2019-04-05

## 2019-04-09 NOTE — PATIENT INSTRUCTIONS
Recommendations from today's MTM visit:                                                        1.  and start fluconazole for 2 weeks    2. Decrease tacrolimus to 1mg twice a day. Will let you know when to increase the dose back to 2mg twice a day.    3. Increase Lantus to 30u daily with UTI and decrease back to 24u daily once majority of BS are back in the 100s or if you have any low blood sugars    Next MTM visit: 3 weeks by phone    To schedule another MTM appointment, please call the clinic directly or you may call the MTM scheduling line at [phone] or toll-free at [phone].     My Clinical Pharmacist's contact information:                                                      It was a pleasure talking with you today!  Please feel free to contact me with any questions or concerns you have.      Conchita Toledo, PharmD, BCACP     You may receive a survey about the MTM services you received by email and/or US Mail.  I would appreciate your feedback to help me serve you better in the future. Your comments will be anonymous.

## 2019-04-10 ENCOUNTER — TELEPHONE (OUTPATIENT)
Dept: FAMILY MEDICINE | Facility: CLINIC | Age: 74
End: 2019-04-10

## 2019-04-10 NOTE — TELEPHONE ENCOUNTER
Reason for Call: call back    Detailed comments: Pt called requesting a call back with either information or a referral for a person that can do in-home diabetic foot care (toe nail trimming). Pt states that she currently has someone that does it but she isn't able to get her as often as she needs. Pt stated that her toenails are growing over now.     Phone Number Patient can be reached at: Home number on file [phone] (home)    Best Time: anytime    Can we leave a detailed message on this number? YES    Call taken on 4/10/2019 at 11:57 AM by Delma Flores

## 2019-04-19 ENCOUNTER — TELEPHONE (OUTPATIENT)
Dept: FAMILY MEDICINE | Facility: CLINIC | Age: 74
End: 2019-04-19

## 2019-04-19 ENCOUNTER — PATIENT OUTREACH (OUTPATIENT)
Dept: CARE COORDINATION | Facility: CLINIC | Age: 74
End: 2019-04-19

## 2019-04-19 DIAGNOSIS — T83.511D URINARY TRACT INFECTION ASSOCIATED WITH INDWELLING URETHRAL CATHETER, SUBSEQUENT ENCOUNTER: Primary | ICD-10-CM

## 2019-04-19 DIAGNOSIS — N39.0 URINARY TRACT INFECTION ASSOCIATED WITH INDWELLING URETHRAL CATHETER, SUBSEQUENT ENCOUNTER: Primary | ICD-10-CM

## 2019-04-19 NOTE — TELEPHONE ENCOUNTER
Relayed PCP's plan to Gem Nugent LM on HC RN phone,  Pt is in agreement & will wait to hear back from RN. Confirmed that pt is back to to normal dose of tacrolimus.   Marivel Klein RN

## 2019-04-19 NOTE — PROGRESS NOTES
Clinic Care Coordination Contact    Situation: Patient chart reviewed by care coordinator.    Background: reviewed home care note from 4-3-19    Assessment: patient is having weekly phone visits with PCP. Receiving monthly B 12 injections from home care nurse and catheter changes every 3 weeks with RN and assistance of HHA    Plan/Recommendations: continue home care skilled nursing visits. Continue weekly phone visits with PCP. Follow up one month    Porsha Khalil RN  [facility]-Care Coordination  [facility]  [phone]

## 2019-04-19 NOTE — TELEPHONE ENCOUNTER
Reason for call:  [facility] RN reporting a symptom    Symptom: Am[facility] reported pt's urine is foul smelling w/ sediment. Pt has also been experiencing high blood sugars for 2 days in the high 200s. Amarilys mentioned that the pt just finished a round of antibiotics. [facility] is requesting a UA/UC order.    Phone Number RN can be reached at:  [phone]    Best Time:  anytime    Can we leave a detailed message on this number:  YES    Call taken on 4/19/2019 at 1:44 PM by Delma Flores

## 2019-04-19 NOTE — TELEPHONE ENCOUNTER
Patient just finished a 14day course of Fluconazole, would not recommend a UA/UC at this time, monitor and check in with patient on Monday, appears patient has no sx if symptomatic over the weekend then should call FNA and we can get a CBC with Diff, UA, and a procalcitonin.    Klaudia MTZ

## 2019-04-19 NOTE — TELEPHONE ENCOUNTER
Amar[facility] is changing catheter today, pt has had no fever & typically does not.  Urine is  very foul smelling & had some leaking of urine due to the sediment build up. Typically her UTI's come on fast & allan . They would like to do a UA/UC, higher BS as well. Pt has standing UA/UC per .  Marivel Klein RN

## 2019-04-22 ENCOUNTER — VIRTUAL VISIT (OUTPATIENT)
Dept: FAMILY MEDICINE | Facility: CLINIC | Age: 74
End: 2019-04-22
Payer: MEDICARE

## 2019-04-22 DIAGNOSIS — Z94.0 KIDNEY REPLACED BY TRANSPLANT: Primary | ICD-10-CM

## 2019-04-22 DIAGNOSIS — Z53.9 DIAGNOSIS NOT YET DEFINED: Primary | ICD-10-CM

## 2019-04-22 DIAGNOSIS — E11.42 TYPE 2 DIABETES MELLITUS WITH DIABETIC POLYNEUROPATHY, WITH LONG-TERM CURRENT USE OF INSULIN (H): ICD-10-CM

## 2019-04-22 DIAGNOSIS — I10 BENIGN ESSENTIAL HYPERTENSION: ICD-10-CM

## 2019-04-22 DIAGNOSIS — Z79.4 TYPE 2 DIABETES MELLITUS WITH DIABETIC POLYNEUROPATHY, WITH LONG-TERM CURRENT USE OF INSULIN (H): ICD-10-CM

## 2019-04-22 DIAGNOSIS — N39.0 RECURRENT UTI: ICD-10-CM

## 2019-04-22 PROCEDURE — G0179 MD RECERTIFICATION HHA PT: HCPCS | Performed by: FAMILY MEDICINE

## 2019-04-22 PROCEDURE — G2012 BRIEF CHECK IN BY MD/QHP: HCPCS | Performed by: NURSE PRACTITIONER

## 2019-04-22 NOTE — PATIENT INSTRUCTIONS
We have ordered you some labs we will let your [facility] nurse know and call you with results     We will not start your Bactrim at this time     We will not order anymore imaging at this time per our conversation     We will not increase your Norvasc until we get your Tacrolimus level back

## 2019-04-22 NOTE — PROGRESS NOTES
"SUBJECTIVE:                                                    Gem Jade is a 73 year old female who presents to clinic today for the following health issues:  [facility]: ***    {Superlists:400948}    Mental Health Follow up ***    Status since last visit: ***    See PHQ-9 for current symptoms.  Other associated symptoms: None    Complicating factors: ***  Significant life event:  No   Current substance abuse:  None  Anxiety or Panic symptoms:  No    Sleep - ***  Appetite - ***  Exercise - ***    Smoking - ***  Alcohol - ***  Street drugs - ***  Marijuana - ***  Caffeine - ***    PHQ-9  English PHQ-9   Any Language           {additional problems for provider to add:385764}      Problems taking medications regularly: {Med Problems:504079::\"No\"}    Medication side effects: {CHRONIC MED SIDE EFFECTS:916693::\"none\"}    Diet: { :878781}    Social History     Tobacco Use     Smoking status: Former Smoker     Years: 17.00     Last attempt to quit: 1987     Years since quittin.9     Smokeless tobacco: Never Used   Substance Use Topics     Alcohol use: No        Problem list and histories reviewed & adjusted, as indicated.  Additional history: as documented    Patient Active Problem List   Diagnosis     Kidney replaced by transplant     Primary localized osteoarthrosis, lower leg     Pulmonary embolism and infarction (H)     Diabetes mellitus with background retinopathy (H)     HYPERLIPIDEMIA LDL GOAL <100     Diabetic gastroparesis (H)     Back pain     Compression fx, thoracic spine (H)     Hyperkalemia     Anemia     Clostridium difficile diarrhea     Hydronephrosis     Recurrent UTI     Benign essential hypertension     Hyponatremia     Vancomycin resistant Enterococcus     Clostridium difficile colitis     Diabetic polyneuropathy associated with type 2 diabetes mellitus (H)     Chronic shoulder pain, unspecified laterality     Morbid obesity, unspecified obesity type (H)     Health Care Home     Paroxysmal " A-fib (H)     Acute kidney injury (H)     Pancytopenia, acquired (H)     Obstructive sleep apnea syndrome     Complicated UTI (urinary tract infection)     Altered Mental Status, Probable Metabolic encephalopathy     Chronic indwelling Johnson catheter     Seizure disorder (H)     Dermatitis     Encephalopathy acute     Type 2 diabetes mellitus with diabetic polyneuropathy, with long-term current use of insulin (H)     Sepsis (H)     Overdose, accidental or unintentional, subsequent encounter     Past Surgical History:   Procedure Laterality Date     C NONSPECIFIC PROCEDURE  10/99    kidney transplant     C NONSPECIFIC PROCEDURE      MRI head, lumbar spine, pelvis     C TOTAL KNEE ARTHROPLASTY  3/03    Knee Replacement, Total right, post op PE     CHOLECYSTECTOMY       HC LEFT HEART CATHETERIZATION      Cardiac Cath, Left Heart, Negative  (@ [facility])     HC LEFT HEART CATHETERIZATION      normal coronary angiogram at [facility], had mild troponin rise (0.71)     HC REMV CATARACT EXTRACAP,INSERT LENS      bilateral cataract repaired, left eye        Social History     Tobacco Use     Smoking status: Former Smoker     Years: 17.00     Last attempt to quit: 1987     Years since quittin.9     Smokeless tobacco: Never Used   Substance Use Topics     Alcohol use: No     Family History   Problem Relation Age of Onset     Diabetes Mother      Parkinsonism Mother      Diabetes Brother      Hypertension Father         [id number] pneumonia     Heart Disease Father            Current Outpatient Medications   Medication Sig Dispense Refill     ACETAMINOPHEN PO Take 325-650 mg by mouth every 4 hours as needed.       amLODIPine (NORVASC) 5 MG tablet Take 1 tablet (5 mg) by mouth daily 60 tablet 0     aspirin 81 MG tablet Take 81 mg by mouth daily       baclofen (LIORESAL) 10 MG tablet Take 1 tablet (10 mg) by mouth 2 times daily as needed for other (Bladder Spasms) 60 tablet 3     blood glucose (ACCU-CHEK COMPACT  PLUS) test strip USE TO TEST BLOOD SUGAR THREE TIMES A  each 11     COMFORT EZ PEN NEEDLES 31G X 6 MM miscellaneous USE 4 DAILY OR AS DIRECTED 100 each 3     Cranberry 400 MG TABS Take 400 mg by mouth 2 times daily       cyanocobalamin (VITAMIN B12) 1000 MCG/ML injection Inject 1 mL (1,000 mcg) into the muscle every 30 days 3 mL 3     diphenoxylate-atropine (LOMOTIL) 2.5-0.025 MG tablet Take 1-2 tablets by mouth 4 times daily as needed for diarrhea 90 tablet 0     famotidine (PEPCID) 20 MG tablet Take 1 tablet (20 mg) by mouth 2 times daily 60 tablet 1     gabapentin (NEURONTIN) 300 MG capsule Take 2 capsules (600 mg) by mouth 2 times daily May take an additional 300mg at bedtime daily with increase pain 360 capsule 3     hydrocortisone 1%/eucerin 1% compounded cream Apply topically 2 times daily (Patient taking differently: Apply topically 2 times daily as needed ) 30 g 1     insulin aspart (NOVOLOG FLEXPEN) 100 UNIT/ML pen Take 10 units with meals twice daily with sliding scale. Max 40 units per day  Sliding scale:   140-239=2 units  240-339=4 units  340-439=6 units  >440=8 units 45 mL 3     insulin glargine (LANTUS SOLOSTAR PEN) 100 UNIT/ML pen Inject 24 Units Subcutaneous At Bedtime 45 mL 3     labetalol (NORMODYNE) 300 MG tablet Take 0.5 tablets (150 mg) by mouth 2 times daily 180 tablet 3     Lactobacillus Rhamnosus, GG, (CULTURELL) capsule Take 1 capsule by mouth 2 times daily 60 capsule 11     levETIRAcetam (KEPPRA) 750 MG tablet Take 1 tablet (750 mg) by mouth 2 times daily 60 tablet 11     levothyroxine (SYNTHROID/LEVOTHROID) 50 MCG tablet TAKE ONE TABLET BY MOUTH EVERY DAY 90 tablet 1     lidocaine (XYLOCAINE) 2 % topical gel Apply topically as needed for moderate pain 10 ML every 3 weeks 30 mL 11     loratadine (CLARITIN) 10 MG tablet Take 1 tablet (10 mg) by mouth daily 90 tablet 3     ondansetron (ZOFRAN ODT) 4 MG ODT tab Take 1 tablet (4 mg) by mouth daily as needed for nausea 30 minutes before  "getting up in your wheelchair 20 tablet 1     order for DME Equipment being ordered: Tegaderm 4x4   MICHELLE 6 months  Sacral wound 1 Box 3     order for DME Equipment being ordered: [employer] Iman gel-T cushion 20 inches wide & 18 inches deep    height 1.676 m (5' 6\"), weight 101.6 kg (223 lb 15.8 oz) 1 each 0     pravastatin (PRAVACHOL) 10 MG tablet Take 1 tablet (10 mg) by mouth daily 90 tablet 3     predniSONE (DELTASONE) 5 MG tablet Take 1 tablet (5 mg) by mouth daily 90 tablet 3     simethicone (MYLICON) 125 MG CHEW Take 1 tablet (125 mg) by mouth as needed for intestinal gas 120 tablet      sulfamethoxazole-trimethoprim (BACTRIM/SEPTRA) 400-80 MG tablet Take 1 tablet by mouth daily 90 tablet 0     tacrolimus (GENERIC EQUIVALENT) 1 MG capsule Take 2 capsules (2 mg) by mouth 2 times daily 120 capsule 11     VITAMIN D, CHOLECALCIFEROL, PO Take 4,000 Units by mouth 2 times daily       Allergies   Allergen Reactions     Atorvastatin Calcium      myalgias, muscle aches     Codeine Nausea and Vomiting     Darvocet [Propoxyphene N-Apap] Nausea and Vomiting     Hydromorphone      Levofloxacin Other (See Comments) and Diarrhea     hallucinations     Morphine      Nausea and vomiting     Niaspan [Niacin] GI Disturbance     Flushing even at low dose, GI upset     Percocet [Oxycodone-Acetaminophen]      Vomiting after taking med couple of times     Vicodin [Hydrocodone-Acetaminophen] Nausea and Vomiting     Recent Labs   Lab Test 03/27/19  1216 03/19/19  1210  02/28/19  0612  02/28/19  0035  09/17/18  1251 09/16/18  0751 09/15/18  2347  05/29/18  0440  05/26/18  0545 05/25/18  0900  12/05/16  1210  02/05/15  1250  05/16/13  1240   A1C  --   --   --  7.3*  --   --   --   --  8.2*  --   --   --   --  8.8*  --    < > 7.4*   < > 6.9*   < > 7.2*   LDL  --   --   --   --   --   --   --   --   --   --   --   --   --   --   --   --  64  --  18  --  149*   HDL  --   --   --   --   --   --   --   --   --   --   --   --   --   --   --   -- "  46*  --  43*  --  42*   TRIG  --   --   --   --   --   --   --   --   --   --   --   --   --   --   --   --  272*  --  223*  --  262*   ALT  --   --   --   --   --  31  --  9  --  10   < >  --    < >  --   --    < >  --    < >  --    < >  --    CR 1.82* 2.26*   < > 2.26*   < > 2.27*   < > 1.72* 1.88* 2.10*   < > 1.39*   < > 1.50*  --    < >  --    < > 0.67   < > 0.70   GFRESTIMATED 27* 21*   < > 21*   < > 21*   < > 29* 26* 23*   < > 37*   < > 34*  --    < >  --    < > 87   < > 84   GFRESTBLACK 32* 24*   < > 24*   < > 24*   < > 35* 32* 28*   < > 45*   < > 41*  --    < >  --    < > >90   GFR Calc     < > >90   POTASSIUM 4.8 4.5   < > 5.5*   < > 6.3*  6.4*   < > 3.8 4.2 4.0   < > 4.0   < > 5.0  --    < >  --    < > 4.3   < > 3.9   TSH  --   --   --   --   --   --   --   --   --   --   --  0.85  --   --  0.94   < >  --    < >  --    < >  --     < > = values in this interval not displayed.      BP Readings from Last 3 Encounters:   03/05/19 166/58   10/10/18 152/70   09/19/18 147/60    Wt Readings from Last 3 Encounters:   03/03/19 113 kg (249 lb 1.9 oz)   09/17/18 101.3 kg (223 lb 4.8 oz)   07/08/18 101.6 kg (223 lb 15.8 oz)        Labs reviewed in EPIC  Problem list, Medication list, Allergies, and Medical/Social/Surgical histories reviewed in [facility] and updated as appropriate.     ROS: Constitutional, neuro, ENT, endocrine, pulmonary, cardiac, gastrointestinal, genitourinary, musculoskeletal, integument and psychiatric systems are negative, except as otherwise noted above in the HPI.    {Scribe Signature}   OBJECTIVE:                                                    There were no vitals taken for this visit.  There is no height or weight on file to calculate BMI.  GENERAL: healthy, alert, well nourished, well hydrated, no distress  EYES: Eyes grossly normal to inspection, extraocular movements - intact, and PERRL  HENT: ear canals- normal; TMs- normal; Nose- normal; Mouth- no ulcers, no  "lesions  NECK: no tenderness, no adenopathy, no asymmetry, no masses, no stiffness; thyroid- normal to palpation  RESP: lungs clear to auscultation - no rales, no rhonchi, no wheezes  CV: regular rates and rhythm, normal S1 S2, no S3 or S4 and no murmur, no click or rub - no homans or cords  ABDOMEN: soft, no tenderness, no  hepatosplenomegaly, no masses, normal bowel sounds  MS: extremities- no gross deformities noted, no edema  SKIN: no suspicious lesions, no rashes  NEURO: strength and tone- normal, sensory exam- grossly normal, mentation- intact, speech- normal, reflexes- symmetric  Non focal no aphasia. No facial asymmetry. Finger to nose, rapid alteration, finger thumb opposition, heel knee shin are normal.  BACK: no CVA tenderness, no paralumbar tenderness  LYMPHATICS: ant. cervical- normal, post. cervical- normal, axillary- normal, supraclavicular- normal, inguinal- normal  Mental Status Assessment:  Appearance:   {Appearance:051631::\"Appropriate \"}  Eye Contact:   {Eye Contact:783767::\"Good \"}  Psychomotor Behavior: {Psychomotor Behavior:162519::\"Normal \"}  Attitude:   {Attitude:803003::\"Cooperative \"}  Orientation:   {Orientation:628611::\"All\"}  Speech   Rate / Production: {Speech Rate/Production:059568::\"Normal \"}   Volume:  {Speech Volume:879773::\"Normal \"}  Mood:    {Mood:326919::\"Normal\"}  Affect:    {Affect:506003::\"Appropriate \"}  Thought Content:  {Thought Content:866312::\"Clear \"}  Thought Form:  {Thought Form:485166::\"Coherent \",\"Logical \"}  Insight:    {Insight:387231::\"Good \"}  Attention Span and Concentration:  limited  Recent and Remote Memory:  intact  Fund of Knowledge: appropriate  Muscle Strength and Tone: normal   Suicidal Ideation: reports no thoughts, no intention  Hallucination: {YES / NO:680938::\"Yes\"}  Paranoid-{YES / NO:036794::\"Yes\"}  Manic-{YES / NO:166631::\"Yes\"}  Panic-{YES / NO:498711::\"Yes\"}  Self harm-{YES / NO:346492::\"Yes\"}      See [facility] notes ***    {Diagnostic Test " "Results:096820::\"Diagnostic Test Results:\",\"none \"}     ASSESSMENT/PLAN:                                                    ***    Patient Instructions:  ***        {Provider Disclaimer}    Marivel Barcenas NP, APRN [facility]    {make me the author button}         "

## 2019-04-22 NOTE — PROGRESS NOTES
"Gem Jade is a 73 year old female who is being evaluated via a billable telephone visit.      The patient has been notified of following:     \"This telephone visit will be conducted via a call between you and your physician/provider. We have found that certain health care needs can be provided without the need for a physical exam.  This service lets us provide the care you need with a short phone conversation.  If a prescription is necessary we can send it directly to your pharmacy.  If lab work is needed we can place an order for that and you can then stop by our lab to have the test done at a later time.    If during the course of the call the physician/provider feels a telephone visit is not appropriate, you will not be charged for this service.\"     Consent has been obtained for this service by 1 care team member: yes. See the scanned image in the medical record.    Gem Jade complains of    Chief Complaint   Patient presents with     RECHECK       I have reviewed and updated the patient's Past Medical History, Social History, Family History and Medication List.    ALLERGIES  Atorvastatin calcium; Codeine; Darvocet [propoxyphene n-apap]; Hydromorphone; Levofloxacin; Morphine; Niaspan [niacin]; Percocet [oxycodone-acetaminophen]; and Vicodin [hydrocodone-acetaminophen]     Peyton Raymond CMA   (MA signature)      URINARY TRACT SYMPTOMS      Duration: 4/19, had some severe fatigue and slept all weekend, confused on Sunday night better today, sugars high, worried about a UTI     Description  odor and leaking, spasms says she just had catheter changed, 4/18/19 sleeping, lots of water, not able to eat much    Intensity:  mild    Accompanying signs and symptoms:  Fever/chills: no   Flank pain no   Nausea and vomiting: no   Vaginal symptoms: odor  Abdominal/Pelvic Pain: no     History  History of frequent UTI's: YES  History of kidney stones: no   Sexually Active: no   Possibility of pregnancy: " No    Precipitating or alleviating factors: Johnson cath    Therapies tried and outcome: course of antibiotics in March, was treated for a fungal infection in her urine, 14days of Fluconazole, we have decided not to start Bactrim, discussed increasing Baclofen to 3x daily as needed       Additional provider notes:  Diabetes Follow-up    Patient is checking blood sugars: three times daily.   Blood sugar testing frequency justification: Uncontrolled diabetes, recent increase in Lantus to 30u  Results are as follows:         am - 374 fasting         suppertime - 174 before dinner          bedtime - 360    Diabetic concerns: None, frequent infections and blood sugar frequently over 200     Symptoms of hypoglycemia (low blood sugar): none     Paresthesias (numbness or burning in feet) or sores: Yes, worse, has been increasing her gabapentin to 3 tabs at bedtime every other day     Date of last diabetic eye exam: unsure    Last PharmD visit 19  Diabetes:  Pt currently taking Lantus 30u daily and Novolog 10 units + 2-8 units sliding scale with breakfast and dinnert.  Pt is not experiencing side effects.   Sliding scale:   140-239=2 units  240-339=4 units  340-439=6 units  >440=8 units  SMB-3 times daily.   Ranges (pt reported):   150-300s since UTI symptoms started   Previously BS were running in low 100s and not needing to use sliding scale before hospitalization      BP Readings from Last 2 Encounters:   19 166/58   10/10/18 152/70     Hemoglobin A1C (%)   Date Value   2019 7.3 (H)   2018 8.2 (H)     LDL Cholesterol Calculated (mg/dL)   Date Value   2016 64   2015 18       Diabetes Management Resources  Hypertension Follow-up      Outpatient blood pressures are being checked at home.  Results are 128/70's, 138/58,140/62, 151/65, 141/56, 145/63, .   Currently taking amlodipine 5mg daily, labetalol 150mg twice daily. No side effects reported. Blood pressures are being checked by HCRN  and self. Using wrist cuff but often checks it against HCRN cuff and seems to be accurate. still slightly high will consider increasing Norvasc at next visit     Low Salt Diet: no added salt    Mental Health Symptoms      Duration: ups and downs depending on infections     Description:  Depression: no  Anxiety: YES- with increase in health issues   Sleep problems: no   Concentration problems: YES- when she has infections    Therapies tried and outcome: no medications currently     See PHQ-9 and LISSY scores, as appropriate      KIDNEY TRANSPLANT:  Discussed the results of her last imaging revealing a kidney and pancreatic cyst, patient and her  prefer to not have any more studies done and will pursue more imaging if she becomes symptomatic, see imaging below 3/2019  IMPRESSION:   1. Right lower quadrant renal transplant with urothelial thickening in  the renal pelvis and fat stranding adjacent to the renal pelvis and  transplant ureter concerning for potential infection. No urinary tract  calculi.  2. Slowly increasing size of an exophytic nodule along the anterior  native right kidney concerning for potential neoplasm. Consider  evaluation with noncontrast MRI to exclude hemorrhagic/proteinaceous  cyst.  3. Grossly unchanged lesion centered in the body of the pancreas,  difficult to tell if this represents a sidebranch or main duct IPMN.  This can also be further evaluated with noncontrast MRI/MRCP.  4. Osteoporosis with multiple age indeterminate associated compression  deformities from T12 to L3.  5. Bibasilar pulmonary calcifications, possibly dendriform  calcifications from chronic aspiration or metastatic pulmonary  calcifications from prior renal failure.  6. Trace pleural effusions.     [Consider Follow Up: Renal cyst] Pancreas cyst       Assessment/Plan:  (Z94.0) Kidney replaced by transplant  (primary encounter diagnosis)  Comment: recent treatment of yeast UTI so Tacrolimus was reduced to 1mg 2x  daily, family not interested in further imaging on kidney or pancreas    Plan: UA with Microscopic reflex to Culture, CBC with        platelets and differential, Basic metabolic         panel  (Ca, Cl, CO2, Creat, Gluc, K, Na, BUN),         Procalcitonin, Tacrolimus level        Will increase Tacrolimus back to 2mg 2x daily, BP still slightly high will consider increasing Norvasc at next visit     (N39.0) Recurrent UTI  Comment: sx have persisted through the weekend with some mental confusion   Plan: UA with Microscopic reflex to Culture, CBC with        platelets and differential, Basic metabolic         panel  (Ca, Cl, CO2, Creat, Gluc, K, Na, BUN),         Procalcitonin, Tacrolimus level        Will order labs to rule out infection, continue sx treatment at this time, we will not start your Bactrim at this time      (E11.42,  Z79.4) Type 2 diabetes mellitus with diabetic polyneuropathy, with long-term current use of insulin (H)  Comment: elevated sugars off and on, lantus recently increased to 30u, taking novolog as directed   Plan: will get labs to rule out an infection, discussed diet, continue present insulin dosing     (I10) Benign essential hypertension  Comment: systolic slightly high   Plan: will monitor and rule out an infection before adjusting meds      Patient Instructions   We have ordered you some labs we will let your [facility] nurse know and call you with results     We will not start your Bactrim at this time     We will not order anymore imaging at this time per our conversation     We will not increase your Norvasc until we get your Tacrolimus level back     I have reviewed the note as documented above.  This accurately captures the substance of my conversation with the patient.  Klaudia Barcenas FNP      Total time of call between patient and provider was 30 minutes     Marivel Barcenas, NP, APRN CNP

## 2019-04-24 ENCOUNTER — APPOINTMENT (OUTPATIENT)
Dept: CT IMAGING | Facility: CLINIC | Age: 74
DRG: 698 | End: 2019-04-24
Attending: EMERGENCY MEDICINE
Payer: MEDICARE

## 2019-04-24 ENCOUNTER — HOSPITAL ENCOUNTER (INPATIENT)
Facility: CLINIC | Age: 74
LOS: 3 days | Discharge: HOME HEALTH | DRG: 698 | End: 2019-04-27
Attending: EMERGENCY MEDICINE | Admitting: INTERNAL MEDICINE
Payer: MEDICARE

## 2019-04-24 DIAGNOSIS — N39.0 URINARY TRACT INFECTION ASSOCIATED WITH INDWELLING URETHRAL CATHETER, INITIAL ENCOUNTER (H): Primary | ICD-10-CM

## 2019-04-24 DIAGNOSIS — T83.511A URINARY TRACT INFECTION ASSOCIATED WITH INDWELLING URETHRAL CATHETER, INITIAL ENCOUNTER (H): Primary | ICD-10-CM

## 2019-04-24 DIAGNOSIS — G93.41 METABOLIC ENCEPHALOPATHY: ICD-10-CM

## 2019-04-24 DIAGNOSIS — E11.42 TYPE 2 DIABETES MELLITUS WITH DIABETIC POLYNEUROPATHY, WITH LONG-TERM CURRENT USE OF INSULIN (H): ICD-10-CM

## 2019-04-24 DIAGNOSIS — Z79.4 TYPE 2 DIABETES MELLITUS WITH DIABETIC POLYNEUROPATHY, WITH LONG-TERM CURRENT USE OF INSULIN (H): ICD-10-CM

## 2019-04-24 DIAGNOSIS — N39.0 URINARY TRACT INFECTION ASSOCIATED WITH INDWELLING URETHRAL CATHETER, SUBSEQUENT ENCOUNTER: ICD-10-CM

## 2019-04-24 DIAGNOSIS — T83.511D URINARY TRACT INFECTION ASSOCIATED WITH INDWELLING URETHRAL CATHETER, SUBSEQUENT ENCOUNTER: ICD-10-CM

## 2019-04-24 LAB
ALBUMIN SERPL-MCNC: 2.9 G/DL (ref 3.4–5)
ALBUMIN UR-MCNC: 100 MG/DL
ALP SERPL-CCNC: 103 U/L (ref 40–150)
ALT SERPL W P-5'-P-CCNC: 22 U/L (ref 0–50)
ANION GAP SERPL CALCULATED.3IONS-SCNC: 8 MMOL/L (ref 3–14)
APPEARANCE UR: ABNORMAL
AST SERPL W P-5'-P-CCNC: 11 U/L (ref 0–45)
BASOPHILS # BLD AUTO: 0 10E9/L (ref 0–0.2)
BASOPHILS NFR BLD AUTO: 0.1 %
BILIRUB SERPL-MCNC: 0.3 MG/DL (ref 0.2–1.3)
BILIRUB UR QL STRIP: NEGATIVE
BUN SERPL-MCNC: 50 MG/DL (ref 7–30)
CALCIUM SERPL-MCNC: 8.7 MG/DL (ref 8.5–10.1)
CHLORIDE SERPL-SCNC: 111 MMOL/L (ref 94–109)
CO2 SERPL-SCNC: 23 MMOL/L (ref 20–32)
COLOR UR AUTO: YELLOW
CREAT SERPL-MCNC: 1.78 MG/DL (ref 0.52–1.04)
DIFFERENTIAL METHOD BLD: ABNORMAL
EOSINOPHIL # BLD AUTO: 0.2 10E9/L (ref 0–0.7)
EOSINOPHIL NFR BLD AUTO: 2.7 %
ERYTHROCYTE [DISTWIDTH] IN BLOOD BY AUTOMATED COUNT: 14.7 % (ref 10–15)
GFR SERPL CREATININE-BSD FRML MDRD: 28 ML/MIN/{1.73_M2}
GLUCOSE BLDC GLUCOMTR-MCNC: 104 MG/DL (ref 70–99)
GLUCOSE SERPL-MCNC: 123 MG/DL (ref 70–99)
GLUCOSE UR STRIP-MCNC: 30 MG/DL
HBA1C MFR BLD: 6.2 % (ref 0–5.6)
HCT VFR BLD AUTO: 33.3 % (ref 35–47)
HGB BLD-MCNC: 10.6 G/DL (ref 11.7–15.7)
HGB UR QL STRIP: ABNORMAL
IMM GRANULOCYTES # BLD: 0 10E9/L (ref 0–0.4)
IMM GRANULOCYTES NFR BLD: 0.1 %
INTERPRETATION ECG - MUSE: NORMAL
KETONES UR STRIP-MCNC: NEGATIVE MG/DL
LACTATE BLD-SCNC: 0.9 MMOL/L (ref 0.7–2)
LEUKOCYTE ESTERASE UR QL STRIP: ABNORMAL
LYMPHOCYTES # BLD AUTO: 1.5 10E9/L (ref 0.8–5.3)
LYMPHOCYTES NFR BLD AUTO: 20 %
MCH RBC QN AUTO: 29.5 PG (ref 26.5–33)
MCHC RBC AUTO-ENTMCNC: 31.8 G/DL (ref 31.5–36.5)
MCV RBC AUTO: 93 FL (ref 78–100)
MONOCYTES # BLD AUTO: 0.6 10E9/L (ref 0–1.3)
MONOCYTES NFR BLD AUTO: 8.4 %
NEUTROPHILS # BLD AUTO: 5 10E9/L (ref 1.6–8.3)
NEUTROPHILS NFR BLD AUTO: 68.7 %
NITRATE UR QL: NEGATIVE
NRBC # BLD AUTO: 0 10*3/UL
NRBC BLD AUTO-RTO: 0 /100
PH UR STRIP: 6 PH (ref 5–7)
PLATELET # BLD AUTO: 138 10E9/L (ref 150–450)
POTASSIUM SERPL-SCNC: 4.5 MMOL/L (ref 3.4–5.3)
PROT SERPL-MCNC: 7.5 G/DL (ref 6.8–8.8)
RBC # BLD AUTO: 3.59 10E12/L (ref 3.8–5.2)
RBC #/AREA URNS AUTO: 0 /HPF (ref 0–2)
SODIUM SERPL-SCNC: 142 MMOL/L (ref 133–144)
SOURCE: ABNORMAL
SP GR UR STRIP: 1.01 (ref 1–1.03)
TROPONIN I SERPL-MCNC: <0.015 UG/L (ref 0–0.04)
UROBILINOGEN UR STRIP-MCNC: NORMAL MG/DL (ref 0–2)
WBC # BLD AUTO: 7.4 10E9/L (ref 4–11)
WBC #/AREA URNS AUTO: >182 /HPF (ref 0–5)
WBC CLUMPS #/AREA URNS HPF: PRESENT /HPF

## 2019-04-24 PROCEDURE — 87186 SC STD MICRODIL/AGAR DIL: CPT | Performed by: INTERNAL MEDICINE

## 2019-04-24 PROCEDURE — 87086 URINE CULTURE/COLONY COUNT: CPT | Performed by: INTERNAL MEDICINE

## 2019-04-24 PROCEDURE — 99285 EMERGENCY DEPT VISIT HI MDM: CPT | Mod: 25

## 2019-04-24 PROCEDURE — 36415 COLL VENOUS BLD VENIPUNCTURE: CPT

## 2019-04-24 PROCEDURE — 83605 ASSAY OF LACTIC ACID: CPT | Performed by: EMERGENCY MEDICINE

## 2019-04-24 PROCEDURE — 96360 HYDRATION IV INFUSION INIT: CPT

## 2019-04-24 PROCEDURE — 80053 COMPREHEN METABOLIC PANEL: CPT | Performed by: EMERGENCY MEDICINE

## 2019-04-24 PROCEDURE — 81001 URINALYSIS AUTO W/SCOPE: CPT | Performed by: EMERGENCY MEDICINE

## 2019-04-24 PROCEDURE — 00000146 ZZHCL STATISTIC GLUCOSE BY METER IP

## 2019-04-24 PROCEDURE — 96361 HYDRATE IV INFUSION ADD-ON: CPT

## 2019-04-24 PROCEDURE — 70450 CT HEAD/BRAIN W/O DYE: CPT

## 2019-04-24 PROCEDURE — 12000000 ZZH R&B MED SURG/OB

## 2019-04-24 PROCEDURE — 25000128 H RX IP 250 OP 636: Performed by: EMERGENCY MEDICINE

## 2019-04-24 PROCEDURE — 25000128 H RX IP 250 OP 636: Performed by: INTERNAL MEDICINE

## 2019-04-24 PROCEDURE — 84484 ASSAY OF TROPONIN QUANT: CPT | Performed by: EMERGENCY MEDICINE

## 2019-04-24 PROCEDURE — 83036 HEMOGLOBIN GLYCOSYLATED A1C: CPT | Performed by: EMERGENCY MEDICINE

## 2019-04-24 PROCEDURE — 93005 ELECTROCARDIOGRAM TRACING: CPT

## 2019-04-24 PROCEDURE — 85025 COMPLETE CBC W/AUTO DIFF WBC: CPT | Performed by: EMERGENCY MEDICINE

## 2019-04-24 PROCEDURE — 87040 BLOOD CULTURE FOR BACTERIA: CPT | Performed by: EMERGENCY MEDICINE

## 2019-04-24 PROCEDURE — 99223 1ST HOSP IP/OBS HIGH 75: CPT | Mod: AI | Performed by: INTERNAL MEDICINE

## 2019-04-24 PROCEDURE — 87088 URINE BACTERIA CULTURE: CPT | Performed by: INTERNAL MEDICINE

## 2019-04-24 PROCEDURE — 25800030 ZZH RX IP 258 OP 636: Performed by: INTERNAL MEDICINE

## 2019-04-24 RX ORDER — ACETAMINOPHEN 650 MG/1
650 SUPPOSITORY RECTAL EVERY 4 HOURS PRN
Status: DISCONTINUED | OUTPATIENT
Start: 2019-04-24 | End: 2019-04-27 | Stop reason: HOSPADM

## 2019-04-24 RX ORDER — SODIUM CHLORIDE, SODIUM LACTATE, POTASSIUM CHLORIDE, CALCIUM CHLORIDE 600; 310; 30; 20 MG/100ML; MG/100ML; MG/100ML; MG/100ML
INJECTION, SOLUTION INTRAVENOUS CONTINUOUS
Status: DISCONTINUED | OUTPATIENT
Start: 2019-04-24 | End: 2019-04-25

## 2019-04-24 RX ORDER — ONDANSETRON 4 MG/1
4 TABLET, ORALLY DISINTEGRATING ORAL EVERY 6 HOURS PRN
Status: DISCONTINUED | OUTPATIENT
Start: 2019-04-24 | End: 2019-04-27 | Stop reason: HOSPADM

## 2019-04-24 RX ORDER — NALOXONE HYDROCHLORIDE 0.4 MG/ML
.1-.4 INJECTION, SOLUTION INTRAMUSCULAR; INTRAVENOUS; SUBCUTANEOUS
Status: DISCONTINUED | OUTPATIENT
Start: 2019-04-24 | End: 2019-04-27 | Stop reason: HOSPADM

## 2019-04-24 RX ORDER — ERTAPENEM 1 G/1
1 INJECTION, POWDER, LYOPHILIZED, FOR SOLUTION INTRAMUSCULAR; INTRAVENOUS EVERY 24 HOURS
Status: DISCONTINUED | OUTPATIENT
Start: 2019-04-24 | End: 2019-04-27 | Stop reason: HOSPADM

## 2019-04-24 RX ORDER — ONDANSETRON 2 MG/ML
4 INJECTION INTRAMUSCULAR; INTRAVENOUS EVERY 6 HOURS PRN
Status: DISCONTINUED | OUTPATIENT
Start: 2019-04-24 | End: 2019-04-27 | Stop reason: HOSPADM

## 2019-04-24 RX ORDER — CEFTRIAXONE 2 G/1
2 INJECTION, POWDER, FOR SOLUTION INTRAMUSCULAR; INTRAVENOUS ONCE
Status: COMPLETED | OUTPATIENT
Start: 2019-04-24 | End: 2019-04-24

## 2019-04-24 RX ORDER — DEXTROSE MONOHYDRATE 25 G/50ML
25-50 INJECTION, SOLUTION INTRAVENOUS
Status: DISCONTINUED | OUTPATIENT
Start: 2019-04-24 | End: 2019-04-27 | Stop reason: HOSPADM

## 2019-04-24 RX ORDER — NICOTINE POLACRILEX 4 MG
15-30 LOZENGE BUCCAL
Status: DISCONTINUED | OUTPATIENT
Start: 2019-04-24 | End: 2019-04-27 | Stop reason: HOSPADM

## 2019-04-24 RX ADMIN — LEVETIRACETAM 750 MG: 100 INJECTION, SOLUTION INTRAVENOUS at 22:30

## 2019-04-24 RX ADMIN — SODIUM CHLORIDE, POTASSIUM CHLORIDE, SODIUM LACTATE AND CALCIUM CHLORIDE: 600; 310; 30; 20 INJECTION, SOLUTION INTRAVENOUS at 20:52

## 2019-04-24 RX ADMIN — SODIUM CHLORIDE 1000 ML: 9 INJECTION, SOLUTION INTRAVENOUS at 12:53

## 2019-04-24 RX ADMIN — ERTAPENEM SODIUM 1 G: 1 INJECTION, POWDER, LYOPHILIZED, FOR SOLUTION INTRAMUSCULAR; INTRAVENOUS at 21:15

## 2019-04-24 RX ADMIN — CEFTRIAXONE SODIUM 2 G: 2 INJECTION, POWDER, FOR SOLUTION INTRAMUSCULAR; INTRAVENOUS at 14:47

## 2019-04-24 NOTE — ED PROVIDER NOTES
History     Chief Complaint:  Altered Mental Status      HPI   Gem Jade is a 73 year old female with a history of Atrial Fibrillation and pulmonary embolism on Aspirin, pyelonephritis, hydronephrosis, recurrent UTI d/t indwelling Johnson catheter s/p kidney transplant, type II DM with gastroparesis and polyneuropathy, and hyponatremia who presents to the emergency department via EMS for evaluation of Altered Mental Status. Patient last known well at 0000 today--she took her medications then. However, over last few days, she has been sleeping a lot more than usual.  thought that she was sleeping this morning, but home care RN came later in the day and RN with  were unable to arouse patient. EMS notes that she is paraplegic at baseline. Normally, she can speak, but will not talk now. Blood glucose was 121, with BP 140s-150s. Pulse in the upper 50s. Did not take medications this morning. She has a history of UTIs, and her  states that this is how her UTIs have presented in the past.    Allergies:  Atorvastatin Calcium  Codeine  Darvocet [Propoxyphene N-Apap]  Hydromorphone  Levofloxacin  Morphine  Niaspan [Niacin]  Percocet [Oxycodone-Acetaminophen]  Vicodin [Hydrocodone-Acetaminophen]      Medications:    Amlodipine   Aspirin    Baclofen   Lomotil   Pepcid   Gabapentin   Insulin Aspart   Insulin glargine   Labetalol   Culturell   Keppra   Levothyroxine   Pravastatin   Prednisone   Simethicone   Tacrolimus      Past Medical History:    Diabetic gastroparesis   Diarrhea  Dizziness and giddiness  Hyponatremia   Kidney replaced by transplant   Mixed hyperlipidemia    Obesity  Osteopenia   Pulmonary embolism   Polyneuropathy in diabetes   Pyelonephritis   Type II DM with complications  Urinary tract infection d/t indwelling catheter   Overdose, accidental or unintentional   Sepsis   Encephalopathy, acute   Chronic indwelling Johnson catheter   Pancytopenia   Paroxysmal Atrial Fibrillation    Hydronephrosis   Compression fracture, thoracic spine   Paraplegia     Past Surgical History:    Kidney transplant  Knee replacement   Cholecystectomy   Left heart catheterization x2  Remove cataract extracap, insert lens     Family History:    Mother - diabetes, Parkinsonism   Brother - diabetes  Father - hypertension, heart disease    Social History:  The patient was accompanied to the ED by EMS.  Smoking Status: former smoker 17 years  Smokeless Tobacco: Never  Alcohol Use: no   Marital Status:        Review of Systems   Unable to perform ROS: Mental status change       Physical Exam     Patient Vitals for the past 24 hrs:   BP Temp Temp src Pulse Heart Rate Resp SpO2 Weight   04/24/19 1415 156/67 -- -- 54 54 9 100 % --   04/24/19 1400 150/67 -- -- 57 56 9 100 % --   04/24/19 1345 156/67 -- -- 57 56 9 100 % --   04/24/19 1333 -- 98  F (36.7  C) Rectal -- -- -- -- --   04/24/19 1252 150/69 -- -- -- -- -- -- --   04/24/19 1251 -- -- -- -- 58 18 98 % 108.9 kg (240 lb)      Physical Exam  Vitals: reviewed by me  General: Pt seen on [facility], protecting airway, moans to painful stimuli only.  Eyes closed, not responsive to verbal stimuli.  Eyes: Pupils are reactive, clear conjunctiva BL  ENT: MMM, midline trachea.   Lungs: No tachypnea, no accessory muscle use. No respiratory distress.  Has a good gag reflex.  CV: Rate as above, regular rhythm.    Abd: Soft, non tender, no guarding, no rebound. Non distended  MSK: no peripheral edema or joint effusion.  No evidence of trauma  Skin: No rash, normal turgor and temperature  Neuro: Clear speech and no facial droop.  Moving bilateral upper extremities to painful stimuli, bilateral lower extremities do not move, baseline.  Psych: Not RIS, no e/o AH/VH    Emergency Department Course     ECG:  Indication: Altered mental status  Completed at 1258.  Read at 1309.   Sinus bradycardia   Otherwise normal ECG   Rate 58 bpm. SD interval 186. QRS duration 82. QT/QTc  442/433. P-R-T axes 53 9 58.    Imaging:  Radiology findings were communicated with the patient, family and Admitting MD who voiced understanding of the findings.    CT Head w/o Contrast:  IMPRESSION:  Diffuse cerebral volume loss and cerebral white matter  changes consistent with chronic small vessel ischemic disease. No  evidence for acute intracranial pathology.  Report per radiology     Laboratory:  Laboratory findings were communicated with the patient, family and Admitting MD who voiced understanding of the findings.    CBC: HGB 10.6 (L),  (L) o/w WNL. (WBC 7.4)   CMP: Chloride 111 (H), Glucose 123 (H), Urea Nitrogen 50 (H), Creatinine 1.78 (H), GFR estimate 28 (L), Albumin 2.9 (L) o/w WNL     Troponin (Collected 1258): <0.015  Lactic Acid: 0.9    UA: Yellow, cloudy urine. Glucose urine 30, Blood urine moderate, Protein Albumin urine 100, Leukocyte Esterase Urine large, WBC urine >182 (H), WBC Clumps present o/w WNL   Blood cultures: Pending  Blood cultures: Pending    Interventions:  1253 - NS Bolus 1,000mL IV   Ordered - Rocephin 2g IV      Emergency Department Course:  Nursing notes and vitals reviewed.  IV was inserted and blood was drawn for laboratory testing, results above.  The patient provided a urine sample here in the emergency department. This was sent for laboratory testing, findings above.  The patient was sent for a CT Head w/o Contrast while in the emergency department, results above.     1248 Patient and I arrived to Stabilization room    1249 Patient transferred to bed     1249 I performed an exam of the patient as documented above.     1250 I exited the room to place orders    1250 RT entered the room    1251 Reentered the room to check vitals     3:03 PM   I spoke with Dr. Austin of the Hospitalist service regarding patient's presentation, findings, and plan of care.  Plan for admit.     Impression & Plan      Medical Decision Making:  Gem Jade is a 73 year old female who  presents to the emergency department today with altered mental status, secondary to what appears to be a urine infection.  Patient has had urine infections in the past that have led to metabolic encephalopathy.  Here in the ER, she is unable to speak or talk, and per , this is baseline for her when she gets an infection.  She has normal vital signs here, normal lactate, but did require fluid resuscitation and IV antibiotics.  Her last culture grew out fungus, and the one before that grew out E. coli that was pansensitive.  Therefore of given Rocephin.  I do think the patient needs to be admitted to a monitored setting, I have elected for a medical bed, telemetry.  Dr. Austin has generously agreed to accept care of the patient, and I will note that a CT scan was also done given patient's suboptimal presentation, and this is thankfully negative for any other type of CNS lesion.  Will monitor very carefully, no need for pressors at this point, patient is fluid responsive.    Critical Care time:  was 35 minutes for this patient excluding procedures.    Diagnosis:    ICD-10-CM   1. Urinary tract infection associated with indwelling urethral catheter, subsequent encounter T83.511D    N39.0   2. Metabolic encephalopathy G93.41       Disposition:  Admitted    Scribe Disclosure:  I, Mary Go, am serving as a scribe at 12:57 PM on 4/24/2019 to document services personally performed by Armen Gregg MD based on my observations and the provider's statements to me.   4/24/2019    EMERGENCY DEPARTMENT       Armen Gregg MD  04/24/19 2086

## 2019-04-24 NOTE — ED NOTES
Bed: ST01  Expected date:   Expected time:   Means of arrival:   Comments:  516  73 M unarousable/vss wnl  1246

## 2019-04-24 NOTE — PHARMACY-ADMISSION MEDICATION HISTORY
Admission medication history interview status for the 4/24/2019  admission is complete. See EPIC admission navigator for prior to admission medications     Medication history source reliability:Good    Actions taken by pharmacist (provider contacted, etc):called spouse     Additional medication history information not noted on PTA med list :None    Medication reconciliation/reorder completed by provider prior to medication history? No    Time spent in this activity: 20 min    Prior to Admission medications    Medication Sig Last Dose Taking? Auth Provider   ACETAMINOPHEN PO Take 325-650 mg by mouth every 4 hours as needed.  Yes Unknown, Entered By History   amLODIPine (NORVASC) 5 MG tablet Take 1 tablet (5 mg) by mouth daily 4/23/2019 at Unknown time Yes Amy Orozco MD   aspirin 81 MG tablet Take 81 mg by mouth daily 4/23/2019 at Unknown time Yes Unknown, Entered By History   baclofen (LIORESAL) 10 MG tablet Take 1 tablet (10 mg) by mouth 2 times daily as needed for other (Bladder Spasms) 4/23/2019 at Unknown time Yes Marivel Barcenas APRN CNP   Cranberry 400 MG TABS Take 400 mg by mouth 2 times daily 4/23/2019 at Unknown time Yes Unknown, Entered By History   cyanocobalamin (VITAMIN B12) 1000 MCG/ML injection Inject 1 mL (1,000 mcg) into the muscle every 30 days ?? Yes Marivel Barcenas APRN CNP   diphenoxylate-atropine (LOMOTIL) 2.5-0.025 MG tablet Take 1-2 tablets by mouth 4 times daily as needed for diarrhea 4/23/2019 at Unknown time Yes Amy Orozco MD   famotidine (PEPCID) 20 MG tablet Take 1 tablet (20 mg) by mouth 2 times daily 4/23/2019 at Unknown time Yes Marivel Barcenas APRN CNP   gabapentin (NEURONTIN) 300 MG capsule Take 2 capsules (600 mg) by mouth 2 times daily May take an additional 300mg at bedtime daily with increase pain 4/23/2019 at Unknown time Yes Marivel Barcenas APRN CNP   hydrocortisone 1%/eucerin 1% compounded cream Apply topically 2 times  daily  Patient taking differently: Apply topically 2 times daily as needed  prn Yes Marivel Barcenas APRN CNP   insulin aspart (NOVOLOG FLEXPEN) 100 UNIT/ML pen Take 10 units with meals twice daily with sliding scale. Max 40 units per day  Sliding scale:   140-239=2 units  240-339=4 units  340-439=6 units  >440=8 units 4/23/2019 at Unknown time Yes Marivel Barcenas APRN CNP   insulin glargine (LANTUS SOLOSTAR PEN) 100 UNIT/ML pen Inject 30 Units Subcutaneous At Bedtime  4/23/2019 at Unknown time Yes Marivel Barcenas APRN CNP   labetalol (NORMODYNE) 300 MG tablet Take 0.5 tablets (150 mg) by mouth 2 times daily 4/23/2019 at Unknown time Yes Marivel Barcenas APRN CNP   Lactobacillus Rhamnosus, GG, (CULTURELL) capsule Take 1 capsule by mouth 2 times daily 4/23/2019 at Unknown time Yes She Siddiqui MD   levETIRAcetam (KEPPRA) 750 MG tablet Take 1 tablet (750 mg) by mouth 2 times daily 4/23/2019 at Unknown time Yes Sharri Lujan MD   levothyroxine (SYNTHROID/LEVOTHROID) 50 MCG tablet TAKE ONE TABLET BY MOUTH EVERY DAY 4/23/2019 at Unknown time Yes Marivel Barcenas APRN CNP   lidocaine (XYLOCAINE) 2 % topical gel Apply topically as needed for moderate pain 10 ML every 3 weeks  Yes Marivel Barcenas APRN CNP   loratadine (CLARITIN) 10 MG tablet Take 1 tablet (10 mg) by mouth daily 4/23/2019 at Unknown time Yes Marivel Barcenas APRN CNP   ondansetron (ZOFRAN ODT) 4 MG ODT tab Take 1 tablet (4 mg) by mouth daily as needed for nausea 30 minutes before getting up in your wheelchair  Yes Marivel Barcenas APRN CNP   pravastatin (PRAVACHOL) 10 MG tablet Take 1 tablet (10 mg) by mouth daily 4/23/2019 at Unknown time Yes Marivel Barcenas APRN CNP   predniSONE (DELTASONE) 5 MG tablet Take 1 tablet (5 mg) by mouth daily 4/23/2019 at Unknown time Yes Marivel Barcenas APRN CNP   simethicone (MYLICON) 125 MG CHEW Take 1  "tablet (125 mg) by mouth as needed for intestinal gas 4/23/2019 at Unknown time Yes    tacrolimus (GENERIC EQUIVALENT) 1 MG capsule Take 2 capsules (2 mg) by mouth 2 times daily 4/23/2019 at Unknown time Yes Marcus Ley MD   VITAMIN D, CHOLECALCIFEROL, PO Take 4,000 Units by mouth 2 times daily 4/23/2019 at Unknown time Yes Unknown, Entered By History   blood glucose (ACCU-CHEK COMPACT PLUS) test strip USE TO TEST BLOOD SUGAR THREE TIMES A DAY   Marivel Barcenas APRN CNP   COMFORT EZ PEN NEEDLES 31G X 6 MM miscellaneous USE 4 DAILY OR AS DIRECTED   Marivel Barcenas APRN CNP   order for DME Equipment being ordered: Tegaderm 4x4   MICHELLE 6 months  Sacral wound   Marivel Barcenas APRN CNP   order for DME Equipment being ordered: [employer] gel-T cushion 20 inches wide & 18 inches deep    height 1.676 m (5' 6\"), weight 101.6 kg (223 lb 15.8 oz)   Marivel Barcenas APRN CNP         "

## 2019-04-24 NOTE — ED NOTES
"[facility]  ED Nurse Handoff Report    ED Chief complaint: Altered Mental Status (last known well at midnight, responds to painful stimuli but difficulty waking up this am.)      ED Diagnosis:   Final diagnoses:   None       Code Status: See H&P    Allergies:   Allergies   Allergen Reactions     Atorvastatin Calcium      myalgias, muscle aches     Codeine Nausea and Vomiting     Darvocet [Propoxyphene N-Apap] Nausea and Vomiting     Hydromorphone      Levofloxacin Other (See Comments) and Diarrhea     hallucinations     Morphine      Nausea and vomiting     Niaspan [Niacin] GI Disturbance     Flushing even at low dose, GI upset     Percocet [Oxycodone-Acetaminophen]      Vomiting after taking med couple of times     Vicodin [Hydrocodone-Acetaminophen] Nausea and Vomiting       Activity level - Baseline/Home:  Total Care    Activity Level - Current:   Total Care     Needed?: No    Isolation: No  Infection: Not Applicable  Bariatric?: No    Vital Signs:   Vitals:    04/24/19 1333 04/24/19 1345 04/24/19 1400 04/24/19 1415   BP:  156/67 150/67 156/67   Pulse:  57 57 54   Resp:  9 9 9   Temp: 98  F (36.7  C)      TempSrc: Rectal      SpO2:  100% 100% 100%   Weight:           Cardiac Rhythm: ,        Pain level:      Is this patient confused?: Yes   Does this patient have a guardian?  No         If yes, is there guardianship documents in the Epic \"Code/ACP\" activity?  No         Guardian Notified?  N/A  [place] - Suicide Severity Rating Scale Completed?  NO, patient obtunded  If yes, what color did the patient score?  Unable to assess    Patient Report: Initial Complaint: frequent rounding  Focused Assessment: Unable to assess orientation, obtunded. Nods appropriately at times, is conversant at baseline per family.  AVSS. Chronic rico, cloudy urine, replaced in ED and urine sent to lab. NPO In Ed. Reddness to periarea and coccyx, peeling skin.   Tests Performed: labs, Ct of head  Abnormal " Results:   Abnormal Labs Reviewed   CBC WITH PLATELETS DIFFERENTIAL - Abnormal; Notable for the following components:       Result Value    RBC Count 3.59 (*)     Hemoglobin 10.6 (*)     Hematocrit 33.3 (*)     Platelet Count 138 (*)     All other components within normal limits   COMPREHENSIVE METABOLIC PANEL - Abnormal; Notable for the following components:    Chloride 111 (*)     Glucose 123 (*)     Urea Nitrogen 50 (*)     Creatinine 1.78 (*)     GFR Estimate 28 (*)     GFR Estimate If Black 32 (*)     Albumin 2.9 (*)     All other components within normal limits     See U/A results.   Treatments provided: 1000cc bolus, IV abx    Family Comments: no one present, patient lives at home with family and has home health care.  updated via phone.     OBS brochure/video discussed/provided to patient/family: N/A              Name of person given brochure if not patient: n/a              Relationship to patient: n/a    ED Medications:   Medications   0.9% sodium chloride BOLUS (0 mLs Intravenous Stopped 4/24/19 1416)       Drips infusing?:  No    For the majority of the shift this patient was Green.   Interventions performed were frequent rounding.    Severe Sepsis OR Septic Shock Diagnosis Present: No    To be done/followed up on inpatient unit:  continue to monitor    ED NURSE PHONE NUMBER: *[phone]

## 2019-04-24 NOTE — PROGRESS NOTES
Admission    Patient arrives to room 627 via cart from ED.  Care plan note: Patient only nods to yes or no questions. VSS on 2L Oxy mask. Total care. Johnson in place.     Inpatient nursing criteria listed below were met:    PCD's Documented: NA  Skin issues/needs documented :Yes  Isolation education started/completed Yes  Patient allergies verified with patient: NA  Verified completion of [place] Risk Assessment Tool:  Yes  Verified completion of Guardianship screening tool: Yes  Fall Prevention: Care plan updated, Education given and documented NA  Care Plan initiated: Yes  Home medications documented in belongings flowsheet: NA  Patient belongings documented in belongings flowsheet: Yes  Reminder note (belongings/ medications) placed in discharge instructions:NA  Admission profile/ required documentation complete: NA

## 2019-04-25 LAB
ANION GAP SERPL CALCULATED.3IONS-SCNC: 8 MMOL/L (ref 3–14)
BUN SERPL-MCNC: 41 MG/DL (ref 7–30)
CALCIUM SERPL-MCNC: 8.8 MG/DL (ref 8.5–10.1)
CHLORIDE SERPL-SCNC: 115 MMOL/L (ref 94–109)
CO2 SERPL-SCNC: 22 MMOL/L (ref 20–32)
CREAT SERPL-MCNC: 1.65 MG/DL (ref 0.52–1.04)
ERYTHROCYTE [DISTWIDTH] IN BLOOD BY AUTOMATED COUNT: 14.8 % (ref 10–15)
GFR SERPL CREATININE-BSD FRML MDRD: 31 ML/MIN/{1.73_M2}
GLUCOSE BLDC GLUCOMTR-MCNC: 102 MG/DL (ref 70–99)
GLUCOSE BLDC GLUCOMTR-MCNC: 112 MG/DL (ref 70–99)
GLUCOSE BLDC GLUCOMTR-MCNC: 60 MG/DL (ref 70–99)
GLUCOSE BLDC GLUCOMTR-MCNC: 81 MG/DL (ref 70–99)
GLUCOSE BLDC GLUCOMTR-MCNC: 83 MG/DL (ref 70–99)
GLUCOSE BLDC GLUCOMTR-MCNC: 89 MG/DL (ref 70–99)
GLUCOSE SERPL-MCNC: 85 MG/DL (ref 70–99)
HCT VFR BLD AUTO: 35.5 % (ref 35–47)
HGB BLD-MCNC: 11.2 G/DL (ref 11.7–15.7)
MCH RBC QN AUTO: 29.5 PG (ref 26.5–33)
MCHC RBC AUTO-ENTMCNC: 31.5 G/DL (ref 31.5–36.5)
MCV RBC AUTO: 93 FL (ref 78–100)
PLATELET # BLD AUTO: 118 10E9/L (ref 150–450)
POTASSIUM SERPL-SCNC: 4.4 MMOL/L (ref 3.4–5.3)
RBC # BLD AUTO: 3.8 10E12/L (ref 3.8–5.2)
SODIUM SERPL-SCNC: 145 MMOL/L (ref 133–144)
WBC # BLD AUTO: 6.5 10E9/L (ref 4–11)

## 2019-04-25 PROCEDURE — 25800030 ZZH RX IP 258 OP 636: Performed by: INTERNAL MEDICINE

## 2019-04-25 PROCEDURE — A9270 NON-COVERED ITEM OR SERVICE: HCPCS | Performed by: INTERNAL MEDICINE

## 2019-04-25 PROCEDURE — 99233 SBSQ HOSP IP/OBS HIGH 50: CPT | Performed by: INTERNAL MEDICINE

## 2019-04-25 PROCEDURE — 12000000 ZZH R&B MED SURG/OB

## 2019-04-25 PROCEDURE — 25000131 ZZH RX MED GY IP 250 OP 636 PS 637: Performed by: INTERNAL MEDICINE

## 2019-04-25 PROCEDURE — 80048 BASIC METABOLIC PNL TOTAL CA: CPT | Performed by: INTERNAL MEDICINE

## 2019-04-25 PROCEDURE — 36415 COLL VENOUS BLD VENIPUNCTURE: CPT | Performed by: INTERNAL MEDICINE

## 2019-04-25 PROCEDURE — 25000132 ZZH RX MED GY IP 250 OP 250 PS 637: Performed by: INTERNAL MEDICINE

## 2019-04-25 PROCEDURE — 25000128 H RX IP 250 OP 636: Performed by: INTERNAL MEDICINE

## 2019-04-25 PROCEDURE — 85027 COMPLETE CBC AUTOMATED: CPT | Performed by: INTERNAL MEDICINE

## 2019-04-25 PROCEDURE — 25800025 ZZH RX 258: Performed by: INTERNAL MEDICINE

## 2019-04-25 PROCEDURE — 00000146 ZZHCL STATISTIC GLUCOSE BY METER IP

## 2019-04-25 RX ORDER — HYDRALAZINE HYDROCHLORIDE 20 MG/ML
10 INJECTION INTRAMUSCULAR; INTRAVENOUS EVERY 4 HOURS PRN
Status: DISCONTINUED | OUTPATIENT
Start: 2019-04-25 | End: 2019-04-27 | Stop reason: HOSPADM

## 2019-04-25 RX ORDER — FAMOTIDINE 20 MG/1
20 TABLET, FILM COATED ORAL 2 TIMES DAILY
Status: DISCONTINUED | OUTPATIENT
Start: 2019-04-25 | End: 2019-04-26

## 2019-04-25 RX ORDER — LACTOBACILLUS RHAMNOSUS GG 10B CELL
1 CAPSULE ORAL 2 TIMES DAILY
Status: DISCONTINUED | OUTPATIENT
Start: 2019-04-25 | End: 2019-04-27 | Stop reason: HOSPADM

## 2019-04-25 RX ORDER — PREDNISONE 5 MG/1
5 TABLET ORAL DAILY
Status: DISCONTINUED | OUTPATIENT
Start: 2019-04-25 | End: 2019-04-27 | Stop reason: HOSPADM

## 2019-04-25 RX ORDER — TACROLIMUS 1 MG/1
2 CAPSULE ORAL 2 TIMES DAILY
Status: DISCONTINUED | OUTPATIENT
Start: 2019-04-25 | End: 2019-04-27 | Stop reason: HOSPADM

## 2019-04-25 RX ORDER — LEVOTHYROXINE SODIUM 50 UG/1
50 TABLET ORAL DAILY
Status: DISCONTINUED | OUTPATIENT
Start: 2019-04-25 | End: 2019-04-27 | Stop reason: HOSPADM

## 2019-04-25 RX ORDER — ASPIRIN 81 MG/1
81 TABLET, CHEWABLE ORAL DAILY
Status: DISCONTINUED | OUTPATIENT
Start: 2019-04-25 | End: 2019-04-27 | Stop reason: HOSPADM

## 2019-04-25 RX ORDER — AMLODIPINE BESYLATE 5 MG/1
5 TABLET ORAL DAILY
Status: DISCONTINUED | OUTPATIENT
Start: 2019-04-25 | End: 2019-04-27 | Stop reason: HOSPADM

## 2019-04-25 RX ORDER — NICOTINE POLACRILEX 4 MG
15-30 LOZENGE BUCCAL
Status: DISCONTINUED | OUTPATIENT
Start: 2019-04-25 | End: 2019-04-25

## 2019-04-25 RX ORDER — SODIUM CHLORIDE 9 MG/ML
INJECTION, SOLUTION INTRAVENOUS CONTINUOUS
Status: DISCONTINUED | OUTPATIENT
Start: 2019-04-25 | End: 2019-04-27

## 2019-04-25 RX ORDER — LEVETIRACETAM 750 MG/1
750 TABLET ORAL 2 TIMES DAILY
Status: DISCONTINUED | OUTPATIENT
Start: 2019-04-25 | End: 2019-04-27 | Stop reason: HOSPADM

## 2019-04-25 RX ORDER — PRAVASTATIN SODIUM 10 MG
10 TABLET ORAL DAILY
Status: DISCONTINUED | OUTPATIENT
Start: 2019-04-25 | End: 2019-04-27 | Stop reason: HOSPADM

## 2019-04-25 RX ORDER — DEXTROSE MONOHYDRATE 25 G/50ML
25-50 INJECTION, SOLUTION INTRAVENOUS
Status: DISCONTINUED | OUTPATIENT
Start: 2019-04-25 | End: 2019-04-25

## 2019-04-25 RX ADMIN — LEVETIRACETAM 750 MG: 750 TABLET, FILM COATED ORAL at 21:25

## 2019-04-25 RX ADMIN — Medication 1 CAPSULE: at 21:25

## 2019-04-25 RX ADMIN — Medication 150 MG: at 11:26

## 2019-04-25 RX ADMIN — PREDNISONE 5 MG: 5 TABLET ORAL at 10:31

## 2019-04-25 RX ADMIN — FAMOTIDINE 20 MG: 20 TABLET ORAL at 11:26

## 2019-04-25 RX ADMIN — Medication 1 CAPSULE: at 10:31

## 2019-04-25 RX ADMIN — PRAVASTATIN SODIUM 10 MG: 10 TABLET ORAL at 11:26

## 2019-04-25 RX ADMIN — DEXTROSE MONOHYDRATE 25 ML: 25 INJECTION, SOLUTION INTRAVENOUS at 04:50

## 2019-04-25 RX ADMIN — SODIUM CHLORIDE: 9 INJECTION, SOLUTION INTRAVENOUS at 19:45

## 2019-04-25 RX ADMIN — SODIUM CHLORIDE: 9 INJECTION, SOLUTION INTRAVENOUS at 09:59

## 2019-04-25 RX ADMIN — TACROLIMUS 2 MG: 1 CAPSULE ORAL at 21:25

## 2019-04-25 RX ADMIN — Medication 150 MG: at 21:25

## 2019-04-25 RX ADMIN — SODIUM CHLORIDE, POTASSIUM CHLORIDE, SODIUM LACTATE AND CALCIUM CHLORIDE: 600; 310; 30; 20 INJECTION, SOLUTION INTRAVENOUS at 06:16

## 2019-04-25 RX ADMIN — TACROLIMUS 2 MG: 1 CAPSULE ORAL at 11:25

## 2019-04-25 RX ADMIN — ERTAPENEM SODIUM 1 G: 1 INJECTION, POWDER, LYOPHILIZED, FOR SOLUTION INTRAMUSCULAR; INTRAVENOUS at 21:25

## 2019-04-25 RX ADMIN — MICONAZOLE NITRATE: 2 POWDER TOPICAL at 09:06

## 2019-04-25 RX ADMIN — ASPIRIN 81 MG 81 MG: 81 TABLET ORAL at 10:31

## 2019-04-25 RX ADMIN — FAMOTIDINE 20 MG: 20 TABLET ORAL at 21:25

## 2019-04-25 RX ADMIN — LEVETIRACETAM 750 MG: 100 INJECTION, SOLUTION INTRAVENOUS at 09:12

## 2019-04-25 RX ADMIN — LEVOTHYROXINE SODIUM 50 MCG: 50 TABLET ORAL at 10:31

## 2019-04-25 RX ADMIN — AMLODIPINE BESYLATE 5 MG: 5 TABLET ORAL at 10:31

## 2019-04-25 ASSESSMENT — ACTIVITIES OF DAILY LIVING (ADL)
TOILETING: 4-->COMPLETELY DEPENDENT
ADLS_ACUITY_SCORE: 32
RETIRED_EATING: 0-->INDEPENDENT
ADLS_ACUITY_SCORE: 22
FALL_HISTORY_WITHIN_LAST_SIX_MONTHS: NO
ADLS_ACUITY_SCORE: 31
BATHING: 4-->COMPLETELY DEPENDENT
AMBULATION: 4-->COMPLETELY DEPENDENT
ADLS_ACUITY_SCORE: 22
SWALLOWING: 0-->SWALLOWS FOODS/LIQUIDS WITHOUT DIFFICULTY
TRANSFERRING: 4-->COMPLETELY DEPENDENT
RETIRED_COMMUNICATION: 0-->UNDERSTANDS/COMMUNICATES WITHOUT DIFFICULTY
ADLS_ACUITY_SCORE: 22
DRESS: 4-->COMPLETELY DEPENDENT
ADLS_ACUITY_SCORE: 32
COGNITION: 1 - ATTENTION OR MEMORY DEFICITS

## 2019-04-25 NOTE — PROVIDER NOTIFICATION
MD Notification    Notified Person: MD    Notified Person Name: Dr. Gray    Notification Date/Time: 0110 4/25/19    Notification Interaction:     Purpose of Notification: blood sugar 83. Pt NPO, any change in IV fluids?    Orders Received: No changes to orders    Comments:

## 2019-04-25 NOTE — H&P
Admitted:     04/24/2019      PRIMARY CARE PHYSICIAN:  Marivel CRUZ CNP      CHIEF COMPLAINT:  Altered mental status.      HISTORY OF PRESENT ILLNESS:  Gem Jade, a 73-year-old female with a complicated medical history including type 2 diabetes, polyneuropathy, kidney transplant in 1999, recurrent UTIs secondary to chronic indwelling Johnson catheter, history of toxic encephalopathy as a result of hyponatremia, hypothyroidism, amongst other diagnoses.  The patient was brought in via EMS to [facility] due to altered mental status.      According to the patient's , who provides the history via telephone, the patient was last seen yesterday at midnight when she took her medications.  However, the patient has been more somnolent the last few days, sleeping more often than usual.  This morning the patient was sleeping in the morning, but her home care RN came later on in the day and was unable to arouse the patient.  EMS was contacted and the patient was unable to speak, which she normally is able to do.  Blood glucose was 121.  Blood pressures were in the 140s, heart rate was in the upper 50s.  The patient stated that she has had history of recurrent UTIs and this is her typical UTI.  The patient was brought into [facility] for further assessment.      In the Emergency Department, the patient was seen by Dr. Gabe Gregg.  The patient was immediately taken to the stabilization room.  The patient was afebrile, vital signs were otherwise stable.  Blood work revealed normal electrolytes, BUN of 50, creatinine 1.7 with calculated GFR of 28.  Her creatinine is much improved than her previous baseline numbers.  I suspect this is at her baseline.  Albumin is 2.9, total protein 7.5, otherwise LFTs are unremarkable.  Lactic acid 0.9.  Troponin is negative.  Glucose is 123.  CBC showed white count 7.4, hemoglobin 10.6, platelets 138.  Blood cultures were obtained.   Head CT showed diffuse cerebral volume loss and small vessel ischemic disease, but nothing acute.  Urinalysis, midstream, showed greater than 182 white cells with 100 of protein, 30 of glucose, large leukocyte esterase.        EKG showed sinus bradycardia without any acute ischemic changes.  In the Emergency Department, the patient was given 2 grams of ceftriaxone and 1 liter of IV crystalloid.  The patient is being admitted for UTI with sepsis.      PAST MEDICAL HISTORY:   1.  Diabetes with gastroparesis.  No diarrhea.   2.  History of dizziness.   3.  Hyponatremia.   4.  History of kidney transplant.   5.  Mixed hyperlipidemia.   6.  Obesity.   7.  Osteopenia.   8.  History of pulmonary embolism.   9.  Polyneuropathy due to diabetes.   10.  Pyelonephritis.   11.  History of recurrent UTIs with chronic indwelling Johnson catheter.   12.  History of accidental or intentional overdose.   13.  Sepsis.    14.  History of acute encephalopathy.   15.  Pancytopenia.   16.  Paroxysmal atrial fibrillation.   17.  Hydronephrosis.   18.  History of thoracic compression fracture.   19.  Diabetic retinopathy.      PAST SURGICAL HISTORY:   1.  Kidney transplant.   2.  History of total knee arthroplasty on the right.   3.  Cholecystectomy.   4.  Left heart catheterization that was negative.   5.  Bilateral cataract repair.      SOCIAL HISTORY:  Former smoker of 17 years, quit in 1987.  No alcohol.  Lives with her .      FAMILY HISTORY:  Mother with diabetes and Parkinson's.  Brother with diabetes, father with hypertension and heart disease.      ALLERGIES:  ATORVASTATIN, CODEINE, DARVOCET, HYDROMORPHONE, LEVOFLOXACIN, MORPHINE, NIASPAN, PERCOCET, AND VICODIN.      CURRENT MEDICATIONS:   1.  Tylenol 325 to 650 every 4 hours.   2.  Norvasc 5 mg daily.     3.  Aspirin 81 mg once a day.   4.  Baclofen 10 mg twice a day.   5.  Cranberry 400 mg twice a day.   6.  Cyanocobalamin 1 mg injection every 30 days.   7.  Lomotil 1-2  tablets 4 times as needed for diarrhea.   8.  Pepcid 20 mg twice a day.   9.  Neurontin 600 mg twice a day.   10.  NovoLog 10 units with meals twice a day, sliding scale.   11.  Lantus 30 units at bedtime.   12.  Labetalol 300 mg take 1/2 tablet or 150 mg twice a day.   13.  Lactobacillus 1 capsule twice a day.   14.  Keppra 750 mg b.i.d.   15.  Synthroid 50 mcg daily.   16.  Lidocaine 2% topical gel, apply topically as needed.   17.  Claritin 10 mg once day.   18.  Zofran 4 mg every day as needed for nausea before getting up in wheelchair.   19.  Pravachol 10 mg daily.   20.  Prednisone 5 mg once day.   21.  Simethicone 125 mg as needed for intestinal gas.      REVIEW OF SYSTEMS:  The patient was unable to provide given her encephalopathy.      PHYSICAL EXAMINATION:   VITAL SIGNS:  Temperature 98, heart rate 57, respirations 18, blood pressure 150/69, sats 100% on room air.   GENERAL:  The patient is a heavyset female who is lethargic and encephalopathic, and does not follow commands.   HEENT:  Head is atraumatic.  Pupils equal.  Sclerae are anicteric.  Oropharynx is dry.   PULMONARY:  Lungs are clear to auscultation anteriorly.  Diminished at the bases.   CARDIOVASCULAR:  S1, S2, sinus fabian.   ABDOMEN:  Obese, soft, no tenderness.  Bowel sounds are active.   EXTREMITIES:  She has bilateral lower extremity edema.  She does withdraw to pain.   NEUROLOGIC:  She is lethargic, withdraws to pain, but does not follow commands.   SKIN:  Slightly cool to touch.  No diaphoresis.   PSYCHIATRIC:  Unable to assess due to encephalopathy      LABORATORY DATA:  As dictated in the history of present illness.      ASSESSMENT:  Gem Jade is a 73-year-old with complicated medical history including chronic indwelling Johnson catheter with recurrent UTIs, admitted with a UTI who has prior history of MRSA, VRE and ESBL infections, who also has a history of diabetes and atrial fibrillation and complications of diabetes, being admitted  due to encephalopathy, due to UTI.       PLAN:   1.  Complicated urinary tract infection.  The patient has chronic indwelling Johnson catheter.  She has a history of prior ESBL in her urine.  She did receive ceftriaxone when I treated her with ertapenem IV.  We will await urine cultures and blood cultures.   2.  Acute metabolic encephalopathy in the setting of complicated UTI.  The patient currently is not at her mental baseline and is encephalopathic.  Therefore, the patient will be made n.p.o. off all her oral medications.  Her blood pressure is under reasonably good control.   3.  History of kidney transplant.  The patient is on prednisone and tacrolimus for this and we hope to resume these once the patient is more stable and alert  tomorrow morning.  Her kidney function is actually stable.   4.  Hyperlipidemia.  We will continue the patient on Pravachol once she is more alert.   5.  Hypothyroidism.  We will continue the patient on Synthroid.   6.  History of seizure disorder.  Currently, the patient is on oral Keppra 750 mg. We will give the patient IV Keppra while she is n.p.o.   7.  History of gastroesophageal reflux disease:  We will continue the patient IV Protonix.   8.  History of diabetic neuropathy and back pain.  We are going to hold the patient's Neurontin and baclofen given her alteration in mentation.   9.  Hypertension.  We will hold the patient's labetalol.  Blood pressure is stable in the 140s.   10.  Deep venous thrombosis.  The patient will have compression boots.      CODE STATUS:  Full.         SAVI CR MD             D: 2019   T: 2019   MT: KACI      Name:     MARYJANE SHAVER   MRN:      [id number]        Account:      [id number]   :      1945        Admitted:     2019                   Document: [id number]       cc: Marivel Barcenas APRN, CNP

## 2019-04-25 NOTE — PROGRESS NOTES
D: ALEC following for discharge planning and per protocol. ALEC reviewed chart. PT/OT ordered but not yet evaluated. Pt is open to [facility] and lives at home with her spouse.   I: ALEC received a phone call from Deanna with [facility] intake, [phone]. A report was made prior to admission. SW received a written request to speak with her. SW updated her on pt's condition. Too soon to know discharge plan. She would like to be updated on discharge plan.   P: ALEC will follow for discharge planning as appropriate.     Aditi Valdes, GENE, SW  [id number]

## 2019-04-25 NOTE — PROGRESS NOTES
"[facility]  Patient is currently open to home care services with [facility]. The patient is currently receiving Skilled Nursing and HHA services. [facility]  and team have been notified of patient admission. [facility] liaison will continue to follow patient during stay. If appropriate provide orders to resume home care at time of discharge.    Addendum: home care nurse relayed the following concern to HC Liaison: \" said she was like that for two days\" and did not call home care, [facility] physician or EMS.     "

## 2019-04-25 NOTE — PLAN OF CARE
DATE & TIME: 4/25/19 0525  ORIENTATION: ROBBY, pt somnolent for shift. Responds to painful stimuli with grunts/nods.  BEHAVIOR & AGGRESSION TOOL COLOR: Green  CIWA SCORE: N/A  ABNL VS/O2: VSS on 1L O2 NC, sats in the lower 80s on RA. /61.  MOBILITY: Total care, turn repo q2.  PAIN MANAGMENT: denies pain  DIET: strict NPO  BOWEL/BLADDER: Rico in place, good output. No BM overnight.  ABNL LAB/BG: BG checks q4. 83/60/112. IV dextrose given for BG of 60. 15 min recheck at 112.  DRAIN/DEVICES: LR infusing at 100 to L PIV. R hand PIV SL. Chronic rico.  TELEMETRY RHYTHM:n/a  SKIN: Slight peeling and redness of the skin in  groin folds.   TESTS/PROCEDURES: blood cultures pending.  D/C DAY/GOALS/PLACE: Pending  OTHER IMPORTANT INFO: on contact isolation for ESBL/VRE/MRSA

## 2019-04-25 NOTE — PLAN OF CARE
DATE & TIME: 4/25/2019 2763-9843  ORIENTATION: disoriented x4, awake, making conversation, but had not memory of what happened, paranoid, thinking people across the redding was listening in the conversation, wanting door closed.   BEHAVIOR & AGGRESSION TOOL COLOR: green   CIWA SCORE: na   ABNL VS/O2: /69, have PRN hydralazine for SBP >170, DBP >105, on RA, lungs clear  MOBILITY: bedridden, turn Q 2 with Ax2.   PAIN MANAGMENT: positioning.   DIET: mod cho, poor appetite.   BOWEL/BLADDER: Rico in place, intact, good UO   ABNL LAB/BG: BG 85/81  DRAIN/DEVICES: rico, two PIV, on in each arm   TELEMETRY RHYTHM: na   SKIN: red, moist groin area, miconazole powder applied.   TESTS/PROCEDURES: UC pending.   D/C DAY/GOALS/PLACE:  pending improvement on mentation.   OTHER IMPORTANT INFO:

## 2019-04-25 NOTE — PLAN OF CARE
DATE & TIME: 4/24/19  ORIENTATION: Confused, only nods to yes or no questions.  BEHAVIOR & AGGRESSION TOOL COLOR: Green  CIWA SCORE: N/A  ABNL VS/O2: VSS on RA  MOBILITY: Total care  PAIN MANAGMENT: Denies  DIET: NPO  BOWEL/BLADDER: Johnson in place, incontinent of stool.  ABNL LAB/BG: Creat 1.78, elevated WBC in urine.  DRAIN/DEVICES: IVF infusing  TELEMETRY RHYTHM:  SKIN: Slight peeling and redness of the skin in  groin folds.   TESTS/PROCEDURES: BG  D/C DAY/GOALS/PLACE: Pending  OTHER IMPORTANT INFO: Paraplegic and neuropathy baseline. IV antibiotics Contact ISO

## 2019-04-25 NOTE — PROGRESS NOTES
[facility]  Hospitalist Progress Note  Jessica Shultz MD    Assessment & Plan   Gem Jade is a 73-year-old female with PMHx significant for chronic indwelling Johnson catheter with recurrent UTIs, history of MRSA, VRE and ESBL infections, renal transplant, chronic anemia, DM2, HTN, HLP, ALLAN, hypothyroidism, seizure disorder, diabetic gastroparesis, diabetic neuropathy, diabetic retinopathy, GERD, osteopenia, provoked PE (after knee replacement in 2003), paroxysmal atrial fibrillation, and OA  who presented with altered mental status. She is admitted for further management.     Complicated urinary tract infection.    The patient has chronic indwelling Johnson catheter for being bedridden has prior history of UTIs with ESBL. She presented with altered mental status. Work-up in ED revealed normal WBC of 7.4, lactic acid of 0.9, and abnormal U/A with large LE, >182 wbc's, and moderate blood. CT head negative for acute intracranial pathology. She did receive ceftriaxone in ED, then switched to IV ertapenem upon admission.     -Continue IV ertapenem  -Change IVF to NS  -Follow blood cultures  -Add urine culture  -PT/OT assessment    Acute metabolic encephalopathy.  Likely due to UTI. It is improving.  -Continue management for UTI as above  -IVF  -Monitor mental status  -Continue supportive care    DM2.  [PTA: Lantus 30 units subcutaneous at bedtime, Novolog 10 units with meals as well as sliding scale]  Hgb A1c 6.2 on 04/24.  -Resume Lantus at 10 units subcutaneous hs  -Continue sliding scale insulin  -Diabetic diet    History of diabetic nephropathy with end-stage renal disease, status post live donor kidney transplant in 1999.  [PTA: Prednisone 5 mg po daily, tacrolimus 2 mg po bid]  Baseline cr recently in the range of 1.6-2.2. CR 1.78 on admission.  -Resume PTA prednisone and tacrolimus    Hypertension.    [PTA: Amlodipine 5 mg po daily, labetalol 150 mg po bid]  BP slightly elevated.  -Resume  PTA BP meds  -IV hydralazine prn  -Monitor BP    Hyperlipidemia.    -Resume PTA pravastatin 10 mg po daily     Hypothyroidism.   -Resume PTA levothyroxine 50 mcg po daily     History of seizure disorder.    [PTA: Keppra 750 mg po bid]  -Continue PTA Keppra    GERD.  [PTA: Famotidine 20 mg po bid]  -Resume PTA famotidine    Chronic anemia.  Baseline Hgb 9-11.  Hgb 10.6 on admission.  -Continue to monitor    Recent Labs   Lab 04/25/19  0811 04/24/19  1258   HGB 11.2* 10.6*       History of diabetic neuropathy and back pain.    -Continue to hold PTA Neurontin and baclofen given encephalopathy    ALLAN (patient uses CPAP).  -Continue with CPAP at night      Orders Placed This Encounter      NPO for Medical/Clinical Reasons Except for: No Exceptions      DVT Prophylaxis: PCD  Code Status: Full code  Disposition: Pending ongoing management and hospital course. Anticipate at least 2 more days of inpatient management.    D/W RN.  D/W  04/25.    Time Spent on this Encounter   I spent 50 minutes on the unit/floor managing the care of Gem Jade. Over 50% of my time was spent counseling the patient and/or coordinating care regarding services listed in this note.      Interval History   Patient is doing better and werner awake and alert today. Tmax 99.6 this am. She reports no chest pain, sob, nv/v/abdominal pain. No new issues overnight.    Data reviewed today: I reviewed all new labs and imaging over the last 24 hours. I personally reviewed no images or EKG's today.    Physical Exam   Temp: 98.8  F (37.1  C) Temp src: Oral BP: 165/61 Pulse: 56 Heart Rate: 66 Resp: 16 SpO2: 99 % O2 Device: Nasal cannula Oxygen Delivery: 1 LPM  Vitals:    04/24/19 1251 04/25/19 0618   Weight: 108.9 kg (240 lb) 104.5 kg (230 lb 6.1 oz)     Vital Signs with Ranges  Temp:  [98  F (36.7  C)-98.8  F (37.1  C)] 98.8  F (37.1  C)  Pulse:  [53-57] 56  Heart Rate:  [54-66] 66  Resp:  [7-18] 16  BP: (132-165)/(59-69) 165/61  SpO2:  [84 %-100 %] 99  %  I/O's Last 24 hours  I/O last 3 completed shifts:  In: -   Out: 600 [Urine:600]    Constitutional: Comfortable, no acute distress  HEENT: +conjunctival pallor.  Neurologic: Awake, alert, oriented x2-3, speech normal  Neck: Supple, no elevated JVP  Respiratory: Clear to auscultation  Cardiovascular: Normal S1 and S2, regular rhythm and rate  GI: Abdomen soft, non tender, non distended  Extremities: No calf tenderness, no significant LE edema  Skin/integument: No acute rash, no cyanosis    Medications   All medications were reviewed.    lactated ringers 100 mL/hr at 04/25/19 0616       ertapenem (INVanz) IV  1 g Intravenous Q24H     insulin aspart  1-6 Units Subcutaneous Q4H     levETIRAcetam  750 mg Intravenous Q12H        Data   Recent Labs   Lab 04/24/19  1258   WBC 7.4   HGB 10.6*   MCV 93   *      POTASSIUM 4.5   CHLORIDE 111*   CO2 23   BUN 50*   CR 1.78*   ANIONGAP 8   ROGER 8.7   *   ALBUMIN 2.9*   PROTTOTAL 7.5   BILITOTAL 0.3   ALKPHOS 103   ALT 22   AST 11   TROPI <0.015       Recent Results (from the past 24 hour(s))   CT Head w/o Contrast    Narrative    CT OF THE HEAD WITHOUT CONTRAST 4/24/2019 1:14 PM     COMPARISON: Head CT 2/28/2019.    HISTORY: Altered level of consciousness (LOC), unexplained.    TECHNIQUE: 5 mm thick axial CT images of the head were acquired  without IV contrast material.    FINDINGS:  There is moderate diffuse cerebral volume loss. There are  subtle patchy areas of decreased density in the cerebral white matter  bilaterally that are consistent with sequela of chronic small vessel  ischemic disease.     The ventricles and basal cisterns are within normal limits in  configuration given the degree of cerebral volume loss.  There is no  midline shift. There are no extra-axial fluid collections.     No intracranial hemorrhage, mass or recent infarct.    The visualized paranasal sinuses are well-aerated. There is no  mastoiditis. There are no fractures of the  visualized bones.       Impression    IMPRESSION:  Diffuse cerebral volume loss and cerebral white matter  changes consistent with chronic small vessel ischemic disease. No  evidence for acute intracranial pathology.    Radiation dose for this scan was reduced using automated exposure  control, adjustment of the mA and/or kV according to patient size, or  iterative reconstruction technique.    SHAAN SEVILLA MD

## 2019-04-26 ENCOUNTER — TELEPHONE (OUTPATIENT)
Dept: FAMILY MEDICINE | Facility: CLINIC | Age: 74
End: 2019-04-26

## 2019-04-26 LAB
ANION GAP SERPL CALCULATED.3IONS-SCNC: 12 MMOL/L (ref 3–14)
BUN SERPL-MCNC: 40 MG/DL (ref 7–30)
CALCIUM SERPL-MCNC: 8.3 MG/DL (ref 8.5–10.1)
CHLORIDE SERPL-SCNC: 113 MMOL/L (ref 94–109)
CO2 SERPL-SCNC: 16 MMOL/L (ref 20–32)
CREAT SERPL-MCNC: 1.57 MG/DL (ref 0.52–1.04)
ERYTHROCYTE [DISTWIDTH] IN BLOOD BY AUTOMATED COUNT: 14.6 % (ref 10–15)
GFR SERPL CREATININE-BSD FRML MDRD: 33 ML/MIN/{1.73_M2}
GLUCOSE BLDC GLUCOMTR-MCNC: 123 MG/DL (ref 70–99)
GLUCOSE BLDC GLUCOMTR-MCNC: 194 MG/DL (ref 70–99)
GLUCOSE BLDC GLUCOMTR-MCNC: 91 MG/DL (ref 70–99)
GLUCOSE BLDC GLUCOMTR-MCNC: 92 MG/DL (ref 70–99)
GLUCOSE BLDC GLUCOMTR-MCNC: 97 MG/DL (ref 70–99)
GLUCOSE SERPL-MCNC: 92 MG/DL (ref 70–99)
HCT VFR BLD AUTO: 30.6 % (ref 35–47)
HGB BLD-MCNC: 9.6 G/DL (ref 11.7–15.7)
MCH RBC QN AUTO: 29 PG (ref 26.5–33)
MCHC RBC AUTO-ENTMCNC: 31.4 G/DL (ref 31.5–36.5)
MCV RBC AUTO: 92 FL (ref 78–100)
PLATELET # BLD AUTO: 117 10E9/L (ref 150–450)
POTASSIUM SERPL-SCNC: 4.3 MMOL/L (ref 3.4–5.3)
RBC # BLD AUTO: 3.31 10E12/L (ref 3.8–5.2)
SODIUM SERPL-SCNC: 141 MMOL/L (ref 133–144)
WBC # BLD AUTO: 6 10E9/L (ref 4–11)

## 2019-04-26 PROCEDURE — 99232 SBSQ HOSP IP/OBS MODERATE 35: CPT | Performed by: INTERNAL MEDICINE

## 2019-04-26 PROCEDURE — 80048 BASIC METABOLIC PNL TOTAL CA: CPT | Performed by: INTERNAL MEDICINE

## 2019-04-26 PROCEDURE — A9270 NON-COVERED ITEM OR SERVICE: HCPCS | Performed by: INTERNAL MEDICINE

## 2019-04-26 PROCEDURE — 25000132 ZZH RX MED GY IP 250 OP 250 PS 637: Performed by: INTERNAL MEDICINE

## 2019-04-26 PROCEDURE — 85027 COMPLETE CBC AUTOMATED: CPT | Performed by: INTERNAL MEDICINE

## 2019-04-26 PROCEDURE — 12000000 ZZH R&B MED SURG/OB

## 2019-04-26 PROCEDURE — 25000128 H RX IP 250 OP 636: Performed by: INTERNAL MEDICINE

## 2019-04-26 PROCEDURE — 40000894 ZZH STATISTIC OT IP EVAL DEFER: Performed by: OCCUPATIONAL THERAPIST

## 2019-04-26 PROCEDURE — 00000146 ZZHCL STATISTIC GLUCOSE BY METER IP

## 2019-04-26 PROCEDURE — 25000131 ZZH RX MED GY IP 250 OP 636 PS 637: Performed by: INTERNAL MEDICINE

## 2019-04-26 PROCEDURE — 40000893 ZZH STATISTIC PT IP EVAL DEFER

## 2019-04-26 PROCEDURE — 25800030 ZZH RX IP 258 OP 636: Performed by: INTERNAL MEDICINE

## 2019-04-26 PROCEDURE — 36415 COLL VENOUS BLD VENIPUNCTURE: CPT | Performed by: INTERNAL MEDICINE

## 2019-04-26 RX ORDER — FAMOTIDINE 20 MG/1
20 TABLET, FILM COATED ORAL DAILY
Status: DISCONTINUED | OUTPATIENT
Start: 2019-04-27 | End: 2019-04-27 | Stop reason: HOSPADM

## 2019-04-26 RX ORDER — GABAPENTIN 300 MG/1
600 CAPSULE ORAL 2 TIMES DAILY
Status: DISCONTINUED | OUTPATIENT
Start: 2019-04-26 | End: 2019-04-27 | Stop reason: HOSPADM

## 2019-04-26 RX ADMIN — LEVETIRACETAM 750 MG: 750 TABLET, FILM COATED ORAL at 08:43

## 2019-04-26 RX ADMIN — FAMOTIDINE 20 MG: 20 TABLET ORAL at 08:43

## 2019-04-26 RX ADMIN — Medication 150 MG: at 08:43

## 2019-04-26 RX ADMIN — AMLODIPINE BESYLATE 5 MG: 5 TABLET ORAL at 08:43

## 2019-04-26 RX ADMIN — SODIUM CHLORIDE: 9 INJECTION, SOLUTION INTRAVENOUS at 06:21

## 2019-04-26 RX ADMIN — Medication 1 CAPSULE: at 08:43

## 2019-04-26 RX ADMIN — INSULIN GLARGINE 10 UNITS: 100 INJECTION, SOLUTION SUBCUTANEOUS at 21:03

## 2019-04-26 RX ADMIN — GABAPENTIN 600 MG: 300 CAPSULE ORAL at 21:16

## 2019-04-26 RX ADMIN — LEVOTHYROXINE SODIUM 50 MCG: 50 TABLET ORAL at 06:21

## 2019-04-26 RX ADMIN — Medication 1 CAPSULE: at 20:58

## 2019-04-26 RX ADMIN — SODIUM CHLORIDE: 9 INJECTION, SOLUTION INTRAVENOUS at 19:01

## 2019-04-26 RX ADMIN — TACROLIMUS 2 MG: 1 CAPSULE ORAL at 20:59

## 2019-04-26 RX ADMIN — Medication 150 MG: at 21:02

## 2019-04-26 RX ADMIN — PREDNISONE 5 MG: 5 TABLET ORAL at 08:43

## 2019-04-26 RX ADMIN — ERTAPENEM SODIUM 1 G: 1 INJECTION, POWDER, LYOPHILIZED, FOR SOLUTION INTRAMUSCULAR; INTRAVENOUS at 20:56

## 2019-04-26 RX ADMIN — LEVETIRACETAM 750 MG: 750 TABLET, FILM COATED ORAL at 20:58

## 2019-04-26 RX ADMIN — ASPIRIN 81 MG 81 MG: 81 TABLET ORAL at 08:43

## 2019-04-26 RX ADMIN — TACROLIMUS 2 MG: 1 CAPSULE ORAL at 08:43

## 2019-04-26 RX ADMIN — PRAVASTATIN SODIUM 10 MG: 10 TABLET ORAL at 08:43

## 2019-04-26 ASSESSMENT — ACTIVITIES OF DAILY LIVING (ADL)
ADLS_ACUITY_SCORE: 32
ADLS_ACUITY_SCORE: 31
ADLS_ACUITY_SCORE: 32
ADLS_ACUITY_SCORE: 31

## 2019-04-26 NOTE — PLAN OF CARE
DATE & TIME: 4/25/2019 7048-7354  ORIENTATION: Disoriented to time.  BEHAVIOR & AGGRESSION TOOL COLOR: Green   CIWA SCORE: na   ABNL VS/O2: VSS on RA  MOBILITY:Bedridden, turn Q 2 with Ax2.   PAIN MANAGMENT:Denies  DIET: Mod cho, poor appetite.   BOWEL/BLADDER: Johnson in place, incontinent of stool.  ABNL LAB/BG: BG 89, 102  DRAIN/DEVICES: Johnson. IVF infusing  TELEMETRY RHYTHM: na   SKIN: Red, moist groin area, miconazole powder applied.   TESTS/PROCEDURES: UC pending.   D/C DAY/GOALS/PLACE:  Pending improvement on mentation.   OTHER IMPORTANT INFO: IV antibiotics. Contact ISO

## 2019-04-26 NOTE — PLAN OF CARE
"Discharge Planner OT   Patient plan for discharge: return to home, with resumed PCA 2 hours daily, and spouse to assist  Current status: order received and chart reviewed.  In discussion with patient, no skilled OT needs identified.  Patient is dependent for all ADLs, except UB washing/teeth, at baseline.  She stays in bed 24/7, though she has 2 christiana lifts and a wheelchair at home.  She has dizziness with all movement, including rolling. She does not sit EOB due to \"thin skin\" and tender bottom. She reports having multiple pieces of DME including extended tub bench, that are unused due to her staying in bed.  She reports having multiple friends and family that visit, and she states she is grateful for the people in her life.  She has PCA 2hours/day to complete washing, dressing, changing linens, and otherwise her spouse assists as needed. She has Home RN 1x/3weeks to assist with rico, and has had Home OT/PT multiple times in the past.  Barriers to return to prior living situation: no OT barriers  Recommendations for discharge: defer to medical team  Rationale for recommendations: no skilled OT needs identified, will complete orders.  Please reorder if needs change.       Entered by: NASREEN GARCIA 04/26/2019 10:24 AM      "

## 2019-04-26 NOTE — PLAN OF CARE
DATE & TIME: 4/26/19 0457  ORIENTATION: A&Ox4. Alert and conversational.   BEHAVIOR & AGGRESSION TOOL COLOR: green   CIWA SCORE: na   ABNL VS/O2: VSS on RA. CPAP on for the night.  MOBILITY: Total care, t/r q2.   PAIN MANAGMENT: denies   DIET: mod carb diet.   BOWEL/BLADDER: Rico in place, good output. Incontinent of stool. 1 large BM overnight.  ABNL LAB/BG: BG 91  DRAIN/DEVICES: rico, PIV x2 SL.   TELEMETRY RHYTHM: na   SKIN: red, moist groin area, miconazole powder applied.   TESTS/PROCEDURES: UC pending.   D/C DAY/GOALS/PLACE:  1-2 days pending progress.  OTHER IMPORTANT INFO: on IV invanz

## 2019-04-26 NOTE — PROGRESS NOTES
[facility]  Hospitalist Progress Note  Jessica Shultz MD    Assessment & Plan   Gem Jade is a 73-year-old female with PMHx significant for chronic indwelling Johnson catheter with recurrent UTIs, history of MRSA, VRE and ESBL infections, renal transplant, chronic anemia, DM2, HTN, HLP, ALLAN, hypothyroidism, seizure disorder, diabetic gastroparesis, diabetic neuropathy, diabetic retinopathy, GERD, osteopenia, provoked PE (after knee replacement in 2003), paroxysmal atrial fibrillation, and OA  who presented with altered mental status. She was admitted for further management.    For a detailed history and physical exam, please refer to the dictated admission note by Dr. Terence Austin on 04/24/2019.     Complicated urinary tract infection (CAUTI),  Chronic urinary catheter.  The patient has chronic indwelling Johnson catheter for being bedridden with prior frequent UTIs, including ESBL. She presented with altered mental status. Work-up in ED revealed normal WBC of 7.4, lactic acid of 0.9, and abnormal U/A with large LE, >182 wbc's, and moderate blood. CT head negative for acute intracranial pathology. She did receive ceftriaxone in ED, then switched to IV ertapenem upon admission.     -Continue IV ertapenem  -Continue IVF  -Follow blood and urine cultures (no growth thus far)  -Continue PTA lactobacillus  -PT/OT assessment    Acute metabolic encephalopathy.   Likely due to UTI and dehydration. It is improving.  -Continue management for UTI as above  -Continue IVF  -Monitor mental status  -Continue supportive care    DM2.  [PTA: Lantus 30 units subcutaneous at bedtime, Novolog 10 units with meals as well as sliding scale]  Hgb A1c 6.2 on 04/24.  -Resumed Lantus at lower dose of 10 units subcutaneous hs  -Continue sliding scale insulin  -Diabetic diet    History of diabetic nephropathy with end-stage renal disease, status post live donor kidney transplant in 1999.  [PTA: Prednisone 5 mg po daily,  tacrolimus 2 mg po bid]  Baseline cr recently in the range of 1.6-2.2. CR 1.78 on admission.  -Continue  PTA prednisone and tacrolimus  -Continue to monitor renal function periodically  -Avoid NSAIDs and nephrotoxins      Recent Labs   Lab 04/26/19  0754 04/25/19  0811 04/24/19  1258   CR 1.57* 1.65* 1.78*       Hypertension.    [PTA: Amlodipine 5 mg po daily, labetalol 150 mg po bid]  BP slightly elevated.  -Continue PTA BP meds  -IV hydralazine prn  -Monitor BP    Hyperlipidemia.    -Resume PTA pravastatin 10 mg po daily     Hypothyroidism.   -Continue PTA levothyroxine 50 mcg po daily     History of seizure disorder.    [PTA: Keppra 750 mg po bid]  -Continue PTA Keppra    GERD.  [PTA: Famotidine 20 mg po bid]  -Continue PTA famotidine    Chronic anemia.  Baseline Hgb 9-11.  Hgb 10.6 on admission. Drop in Hgb likely due to hemodilution.    -Continue to monitor periodically    Recent Labs   Lab 04/26/19  0754 04/25/19  0811 04/24/19  1258   HGB 9.6* 11.2* 10.6*       History of diabetic neuropathy and back pain.    -Continue to hold PTA Neurontin and baclofen given encephalopathy    ALLAN (patient uses CPAP).  -Continue with CPAP at night    Orders Placed This Encounter      Combination Diet Regular Diet Adult; 5945-9418 Calories: Moderate Consistent CHO (4-6 CHO units/meal)      DVT Prophylaxis: PCD  Code Status: Full code  Disposition: Anticipate at least 1- 2 more days of inpatient management, pending hospital course and culture results. Hopefully back to home.    D/W RN.  Discussed with  04/25. Call  again today, but not available.      Interval History   Patient is more alert and oriented today. No fever today. She reports no chest pain, sob, nv/v/abdominal pain. No new issues overnight.    Data reviewed today: I reviewed all new labs and imaging over the last 24 hours. I personally reviewed no images or EKG's today.    Physical Exam   Temp: 98.8  F (37.1  C) Temp src: Oral BP: 153/64 Pulse: 71  Heart Rate: 71 Resp: 16 SpO2: 94 % O2 Device: None (Room air)    Vitals:    04/24/19 1251 04/25/19 0618 04/26/19 0707   Weight: 108.9 kg (240 lb) 104.5 kg (230 lb 6.1 oz) 105.1 kg (231 lb 11.3 oz)     Vital Signs with Ranges  Temp:  [98.7  F (37.1  C)-98.8  F (37.1  C)] 98.8  F (37.1  C)  Pulse:  [71-73] 71  Heart Rate:  [67-71] 71  Resp:  [16-18] 16  BP: (140-158)/(58-72) 153/64  SpO2:  [94 %-95 %] 94 %  I/O's Last 24 hours  I/O last 3 completed shifts:  In: 2824 [P.O.:500; I.V.:2324]  Out: 2350 [Urine:2350]    Constitutional: Comfortable, no acute distress  HEENT: +conjunctival pallor.  Neurologic: Awake, alert, oriented x2-3, speech normal  Neck: Supple, no elevated JVP  Respiratory: Clear to auscultation  Cardiovascular: Normal S1 and S2, regular rhythm and rate  GI: Abdomen soft, non tender, non distended  Extremities: No calf tenderness, no significant LE edema  Skin/integument: No acute rash, no cyanosis    Medications   All medications were reviewed.    sodium chloride 75 mL/hr at 04/26/19 0621       amLODIPine  5 mg Oral Daily     aspirin  81 mg Oral Daily     ertapenem (INVanz) IV  1 g Intravenous Q24H     [START ON 4/27/2019] famotidine  20 mg Oral Daily     insulin aspart  1-7 Units Subcutaneous TID AC     insulin aspart  1-5 Units Subcutaneous At Bedtime     insulin glargine  10 Units Subcutaneous At Bedtime     labetalol  150 mg Oral BID     lactobacillus rhamnosus (GG)  1 capsule Oral BID     levETIRAcetam  750 mg Oral BID     levothyroxine  50 mcg Oral Daily     pravastatin  10 mg Oral Daily     predniSONE  5 mg Oral Daily     tacrolimus  2 mg Oral BID        Data   Recent Labs   Lab 04/26/19  0754 04/25/19  0811 04/24/19  1258   WBC 6.0 6.5 7.4   HGB 9.6* 11.2* 10.6*   MCV 92 93 93   * 118* 138*    145* 142   POTASSIUM 4.3 4.4 4.5   CHLORIDE 113* 115* 111*   CO2 16* 22 23   BUN 40* 41* 50*   CR 1.57* 1.65* 1.78*   ANIONGAP 12 8 8   ROGER 8.3* 8.8 8.7   GLC 92 85 123*   ALBUMIN  --   --   2.9*   PROTTOTAL  --   --  7.5   BILITOTAL  --   --  0.3   ALKPHOS  --   --  103   ALT  --   --  22   AST  --   --  11   TROPI  --   --  <0.015       No results found for this or any previous visit (from the past 24 hour(s)).

## 2019-04-26 NOTE — CONSULTS
Care Transition Initial Assessment - RN    Care Coordinator met with patient as she is high risk for readmission and to discuss discharge plans. Discussed  home care concerns regarding spouse not initiating medical help timely  when patient has decreased responsiveness. Patient stating she did not feel it is an issue and she is getting the appropriate care. Writer received a call from [facility] Liaison who stated she  has spoken to patients spouse and voiced their concerns and stated they will not accept patient unless spouse signs an agreement that he will call for medical help appropriately.   Patient stating she would prefer returning to her home. She has all devices available for mobility and transfers and pressure reduction mattress. Patient is basically w/c and bed bound. Plan on discharge Sunday if medically stable.

## 2019-04-26 NOTE — PLAN OF CARE
"Discharge Planner PT   Patient plan for discharge: return Home with PCA 2 hrs every other day,  to assist, home RN every 3 weeks  Current status: PT orders received, chart reviewed. In discussion with patient and communication with OT, no skilled PT needs identified. Patient remains in bed 24hrs/day, has 2 christiana lifts but uses rarely as stays in bed. Pt rolls using strap/handle, has adjustable bed, does not sit EOB. Patient has PCA 2hrs every other day and home RN every 3 weeks to change catheter. Patient has had home therapies in the past. Patient states she is moving \"the same\" here as she does at home.  Barriers to return to prior living situation: none  Recommendations for discharge: from mobility standpoint; return home with assist of , PCA, home RN  Rationale for recommendations: Patient is at baseline mobility, remains in bed 24hrs/day at home. No skilled PT needs identified. Will complete orders.       Entered by: Diamante Werner 04/26/2019 11:14 AM       "

## 2019-04-27 VITALS
OXYGEN SATURATION: 100 % | RESPIRATION RATE: 16 BRPM | HEART RATE: 75 BPM | TEMPERATURE: 98.3 F | BODY MASS INDEX: 35.1 KG/M2 | SYSTOLIC BLOOD PRESSURE: 157 MMHG | WEIGHT: 237.66 LBS | DIASTOLIC BLOOD PRESSURE: 57 MMHG

## 2019-04-27 LAB
BACTERIA SPEC CULT: ABNORMAL
GLUCOSE BLDC GLUCOMTR-MCNC: 101 MG/DL (ref 70–99)
GLUCOSE BLDC GLUCOMTR-MCNC: 158 MG/DL (ref 70–99)
GLUCOSE BLDC GLUCOMTR-MCNC: 163 MG/DL (ref 70–99)
Lab: ABNORMAL
SPECIMEN SOURCE: ABNORMAL

## 2019-04-27 PROCEDURE — 25000132 ZZH RX MED GY IP 250 OP 250 PS 637: Performed by: INTERNAL MEDICINE

## 2019-04-27 PROCEDURE — 99239 HOSP IP/OBS DSCHRG MGMT >30: CPT | Performed by: INTERNAL MEDICINE

## 2019-04-27 PROCEDURE — 25000131 ZZH RX MED GY IP 250 OP 636 PS 637: Performed by: INTERNAL MEDICINE

## 2019-04-27 PROCEDURE — A9270 NON-COVERED ITEM OR SERVICE: HCPCS | Performed by: INTERNAL MEDICINE

## 2019-04-27 PROCEDURE — 00000146 ZZHCL STATISTIC GLUCOSE BY METER IP

## 2019-04-27 PROCEDURE — 25800030 ZZH RX IP 258 OP 636: Performed by: INTERNAL MEDICINE

## 2019-04-27 RX ORDER — CIPROFLOXACIN 500 MG/1
500 TABLET, FILM COATED ORAL 2 TIMES DAILY
Qty: 12 TABLET | Refills: 0 | Status: ON HOLD | OUTPATIENT
Start: 2019-04-27 | End: 2019-06-03

## 2019-04-27 RX ADMIN — PREDNISONE 5 MG: 5 TABLET ORAL at 10:32

## 2019-04-27 RX ADMIN — LEVOTHYROXINE SODIUM 50 MCG: 50 TABLET ORAL at 06:33

## 2019-04-27 RX ADMIN — GABAPENTIN 600 MG: 300 CAPSULE ORAL at 10:33

## 2019-04-27 RX ADMIN — TACROLIMUS 2 MG: 1 CAPSULE ORAL at 10:31

## 2019-04-27 RX ADMIN — AMLODIPINE BESYLATE 5 MG: 5 TABLET ORAL at 10:33

## 2019-04-27 RX ADMIN — LEVETIRACETAM 750 MG: 750 TABLET, FILM COATED ORAL at 10:35

## 2019-04-27 RX ADMIN — SODIUM CHLORIDE: 9 INJECTION, SOLUTION INTRAVENOUS at 10:02

## 2019-04-27 RX ADMIN — Medication 150 MG: at 10:35

## 2019-04-27 RX ADMIN — PRAVASTATIN SODIUM 10 MG: 10 TABLET ORAL at 10:34

## 2019-04-27 RX ADMIN — Medication 1 CAPSULE: at 10:32

## 2019-04-27 RX ADMIN — FAMOTIDINE 20 MG: 20 TABLET ORAL at 10:32

## 2019-04-27 RX ADMIN — ASPIRIN 81 MG 81 MG: 81 TABLET ORAL at 10:34

## 2019-04-27 ASSESSMENT — ACTIVITIES OF DAILY LIVING (ADL)
ADLS_ACUITY_SCORE: 31
ADLS_ACUITY_SCORE: 32
ADLS_ACUITY_SCORE: 32
ADLS_ACUITY_SCORE: 31
ADLS_ACUITY_SCORE: 31

## 2019-04-27 NOTE — DISCHARGE INSTRUCTIONS
Resumption of [facility].  They will call to resume services.  Their number is [phone].

## 2019-04-27 NOTE — CONSULTS
ID consult dictated IMP 1 72 yo renal transplant, recurrent urosepsis, well known to me, another episode rel mild compared to others incl recent E coli bacteremia at U    REC although cipro int is 0.5 so is Ok, 6 days more cipro 500 bid and disposition Ok with me Follow-up U and transplant

## 2019-04-27 NOTE — PROGRESS NOTES
"Discharge    Patient discharged to home via stretcher with [facility]    Pt alert and oriented x4, but does endorse mild hallucinations.  VSS. Total care, t/r q2h. Inc 1 small BM, rico cath patent.   No c/o pain, baseline neuropathy BLE.  Appetite fair, /158. Skin intact, mild blanchable redness to coccyx and slight excoriation of skin from \"wiping so much from diarrhea a while ago\".  Pt will discharge home this afternoon.      Spoke with  Keshawn over the phone.  He was tearful re: \"being accused of not taking care of my wife. She was very sleepy for 36 hours prior to coming here, but she would still wake up and talk and  take her meds.  When she wont take her meds or eat I know somethings wrong and I always call 911 right away.\"  Writer provided emotional support And went over new discharge meds ( Cipro PO and change to lantus dosing) via telephone and  verbalized understanding.    will make follow up appointment.      Listed belongings gathered and returned to patient. Yes  Care Plan and Patient education resolved: Yes  Prescriptions if needed, hard copies sent with patient  NA  Home and hospital acquired medications returned to patient: Yes  Medication Bin checked and emptied on discharge Yes  Follow up appointment made for patient: No      "

## 2019-04-27 NOTE — PROGRESS NOTES
SW:  D:  Care Coordinator RN is following patient for discharge planning.   At time of discharge, Deanna from [facility], [phone],  is requesting to be updated regarding the final discharge arrangements.  If patient discharges on the weekend, her phone number does have voice mail.  P:  Social Work is available for discharge planning as requested by MD/RN Care Coordinator.

## 2019-04-27 NOTE — DISCHARGE SUMMARY
[facility]    Discharge Summary  Hospitalist    Date of Admission:  4/24/2019  Date of Discharge:  4/27/2019  Discharging Provider: Catrachito Robert MD    Discharge Diagnoses      Catheter associated UTI  Chronic indwelling urinary catheter.  Acute metabolic encephalopathy.   DM2.  Chronic kidney disease status 3  History of diabetic nephropathy with end-stage renal disease, status post live donor kidney transplant in 1999.  Hypertension.    Hyperlipidemia.    Hypothyroidism.   History of seizure disorder.    GERD.  Chronic anemia.  History of diabetic neuropathy and back pain.    ALLAN (patient uses CPAP).    Hospital Course:    Gem Jade is a 73-year-old female with PMHx significant for chronic indwelling Johnson catheter with recurrent UTIs, history of MRSA, VRE and ESBL infections, renal transplant, chronic anemia, DM2, HTN, HLP, ALLAN, hypothyroidism, seizure disorder, diabetic gastroparesis, diabetic neuropathy, diabetic retinopathy, GERD, osteopenia, provoked PE (after knee replacement in 2003), paroxysmal atrial fibrillation, and OA  who presented with altered mental status. She was admitted for further management.     Complicated urinary tract infection (CAUTI)  Chronic urinary catheter.  The patient has chronic indwelling Johnson catheter for being bedridden with prior frequent UTIs, including ESBL. She presented with altered mental status. CT head negative for acute intracranial pathology. She did receive ceftriaxone in ED, then switched to IV ertapenem upon admission.   -Received 4 days of IV antibiotics in the hospital  -Infectious disease was consulted for antibiotic recommendation, Dr. Olmedo recommended Cipro for 6 days at discharge.  Confirmed with  no allergies to ciprofloxacin.  She had received a month ago.  -Blood culture negative, urine culture positive for Citrobacter  -Continue PTA lactobacillus  -Resume home health care at discharge.     Acute metabolic encephalopathy.    Likely due to UTI and dehydration. It is improving.  -Continue management for UTI as above  -Back to baseline per  on the day of discharge.    DM2.  [PTA: Lantus 30 units subcutaneous at bedtime, Novolog 10 units with meals as well as sliding scale]  Hgb A1c 6.2 on 04/24.  -Resumed Lantus at lower dose of 15 units subcutaneous hs; this can be uptitrated in the outpatient setting -Continue sliding scale insulin  -Diabetic diet     History of diabetic nephropathy with end-stage renal disease, status post live donor kidney transplant in 1999.  [PTA: Prednisone 5 mg po daily, tacrolimus 2 mg po bid]  Baseline cr recently in the range of 1.6-2.2. CR 1. 5 7 on the day of discharge.    -Continue  PTA prednisone and tacrolimus  -Continue to monitor renal function periodically.  Recommend rechecking in 1 week.  -Avoid NSAIDs and nephrotoxins      Hypertension.    [PTA: Amlodipine 5 mg po daily, labetalol 150 mg po bid]  BP slightly elevated.  -Continue PTA BP meds  -IV hydralazine prn  -Monitor BP     Hyperlipidemia.    -Resume PTA pravastatin 10 mg po daily      Hypothyroidism.   -Continue PTA levothyroxine 50 mcg po daily      History of seizure disorder.    [PTA: Keppra 750 mg po bid]  -Continue PTA Keppra     GERD.  [PTA: Famotidine 20 mg po bid]  -Continue PTA famotidine     Chronic anemia.  Baseline Hgb 9-11.  Hgb 10.6 on admission. Drop in Hgb likely due to hemodilution.  Stable     History of diabetic neuropathy and back pain.    -Continue to hold PTA Neurontin and baclofen given encephalopathy     ALLAN (patient uses CPAP).  -Continue with CPAP at night      Catrachito Robert MD    Significant Results and Procedures   See below    Pending Results     Unresulted Labs Ordered in the Past 30 Days of this Admission     Date and Time Order Name Status Description    4/24/2019 1251 Blood culture Preliminary     4/24/2019 1251 Blood culture Preliminary           Code Status   Full Code       Primary Care Physician    Marivel Barcenas NP    Physical Exam   Temp: 97.6  F (36.4  C) Temp src: Oral BP: 136/60 Pulse: 75 Heart Rate: 67 Resp: 16 SpO2: 97 % O2 Device: None (Room air)      Constitutional: AAOX3, NAD  Respiratory: CTA B/L, Normal WOB  Cardiovascular: RRR, No murmur  GI: Soft, Non- tender, BS- normoactive  Skin/Integument: no rashes  MSK: no edema  Neuro: CN- grossly intact    Discharge Disposition   Discharged to home  Condition at discharge: Stable    Consultations This Hospital Stay   PHYSICAL THERAPY ADULT IP CONSULT  OCCUPATIONAL THERAPY ADULT IP CONSULT  SOCIAL WORK IP CONSULT  CARE TRANSITION RN/SW IP CONSULT  INFECTIOUS DISEASES IP CONSULT    Time Spent on this Encounter   ICatrachito, personally saw the patient today and spent greater than 30 minutes discharging this patient.    Discharge Orders      Home care nursing referral      Follow-up and recommended labs and tests    Follow up with primary care provider, Marivel Barcenas NP, within 7 days for hospital follow- up.  The following labs/tests are recommended: CBC and BMP in 1 week.     Activity    Your activity upon discharge: activity as tolerated     Reason for your hospital stay    Catheter associated UTI     Full Code     Diet    Follow this diet upon discharge: Orders Placed This Encounter      Room Service      Combination Diet Regular Diet Adult; [id number] Calories: Moderate Consistent CHO (4-6 CHO units/meal)       Discharge Medications   Current Discharge Medication List      CONTINUE these medications which have CHANGED    Details   ciprofloxacin (CIPRO) 500 MG tablet Take 1 tablet (500 mg) by mouth 2 times daily for 6 days  Qty: 12 tablet, Refills: 0    Associated Diagnoses: Urinary tract infection associated with indwelling urethral catheter, initial encounter (H)      insulin glargine (LANTUS SOLOSTAR PEN) 100 UNIT/ML pen Inject 15 Units Subcutaneous At Bedtime  Qty: 45 mL, Refills: 3    Associated Diagnoses: Type 2  diabetes mellitus with diabetic polyneuropathy, with long-term current use of insulin (H)         CONTINUE these medications which have NOT CHANGED    Details   ACETAMINOPHEN PO Take 325-650 mg by mouth every 4 hours as needed.      amLODIPine (NORVASC) 5 MG tablet Take 1 tablet (5 mg) by mouth daily  Qty: 60 tablet, Refills: 0    Associated Diagnoses: Benign essential hypertension      aspirin 81 MG tablet Take 81 mg by mouth daily      baclofen (LIORESAL) 10 MG tablet Take 1 tablet (10 mg) by mouth 2 times daily as needed for other (Bladder Spasms)  Qty: 60 tablet, Refills: 3    Associated Diagnoses: Urinary catheter (Johnson) change required      Cranberry 400 MG TABS Take 400 mg by mouth 2 times daily      cyanocobalamin (VITAMIN B12) 1000 MCG/ML injection Inject 1 mL (1,000 mcg) into the muscle every 30 days  Qty: 3 mL, Refills: 3    Associated Diagnoses: Iron deficiency anemia, unspecified iron deficiency anemia type      diphenoxylate-atropine (LOMOTIL) 2.5-0.025 MG tablet Take 1-2 tablets by mouth 4 times daily as needed for diarrhea  Qty: 90 tablet, Refills: 0    Associated Diagnoses: Diarrhea, unspecified type      famotidine (PEPCID) 20 MG tablet Take 1 tablet (20 mg) by mouth 2 times daily  Qty: 60 tablet, Refills: 1      gabapentin (NEURONTIN) 300 MG capsule Take 2 capsules (600 mg) by mouth 2 times daily May take an additional 300mg at bedtime daily with increase pain  Qty: 360 capsule, Refills: 3    Associated Diagnoses: Diabetic polyneuropathy associated with type 2 diabetes mellitus (H)      hydrocortisone 1%/eucerin 1% compounded cream Apply topically 2 times daily  Qty: 30 g, Refills: 1    Associated Diagnoses: Rash and nonspecific skin eruption      insulin aspart (NOVOLOG FLEXPEN) 100 UNIT/ML pen Take 10 units with meals twice daily with sliding scale. Max 40 units per day  Sliding scale:   140-239=2 units  240-339=4 units  340-439=6 units  >440=8 units  Qty: 45 mL, Refills: 3    Associated  Diagnoses: Type 2 diabetes mellitus with diabetic polyneuropathy, with long-term current use of insulin (H)      labetalol (NORMODYNE) 300 MG tablet Take 0.5 tablets (150 mg) by mouth 2 times daily  Qty: 180 tablet, Refills: 3      Lactobacillus Rhamnosus, GG, (CULTURELL) capsule Take 1 capsule by mouth 2 times daily  Qty: 60 capsule, Refills: 11    Associated Diagnoses: Diarrhea      levETIRAcetam (KEPPRA) 750 MG tablet Take 1 tablet (750 mg) by mouth 2 times daily  Qty: 60 tablet, Refills: 11    Associated Diagnoses: Seizure disorder (H)      levothyroxine (SYNTHROID/LEVOTHROID) 50 MCG tablet TAKE ONE TABLET BY MOUTH EVERY DAY  Qty: 90 tablet, Refills: 1    Associated Diagnoses: Other specified hypothyroidism      lidocaine (XYLOCAINE) 2 % topical gel Apply topically as needed for moderate pain 10 ML every 3 weeks  Qty: 30 mL, Refills: 11    Associated Diagnoses: Johnson catheter in place      loratadine (CLARITIN) 10 MG tablet Take 1 tablet (10 mg) by mouth daily  Qty: 90 tablet, Refills: 3    Associated Diagnoses: Acute nasopharyngitis      ondansetron (ZOFRAN ODT) 4 MG ODT tab Take 1 tablet (4 mg) by mouth daily as needed for nausea 30 minutes before getting up in your wheelchair  Qty: 20 tablet, Refills: 1    Associated Diagnoses: Vertigo      pravastatin (PRAVACHOL) 10 MG tablet Take 1 tablet (10 mg) by mouth daily  Qty: 90 tablet, Refills: 3    Associated Diagnoses: Hyperlipidemia LDL goal <100      predniSONE (DELTASONE) 5 MG tablet Take 1 tablet (5 mg) by mouth daily  Qty: 90 tablet, Refills: 3    Associated Diagnoses: Kidney replaced by transplant      simethicone (MYLICON) 125 MG CHEW Take 1 tablet (125 mg) by mouth as needed for intestinal gas  Qty: 120 tablet      tacrolimus (GENERIC EQUIVALENT) 1 MG capsule Take 2 capsules (2 mg) by mouth 2 times daily  Qty: 120 capsule, Refills: 11    Associated Diagnoses: Kidney replaced by transplant      VITAMIN D, CHOLECALCIFEROL, PO Take 4,000 Units by mouth 2  "times daily      blood glucose (ACCU-CHEK COMPACT PLUS) test strip USE TO TEST BLOOD SUGAR THREE TIMES A DAY  Qty: 102 each, Refills: 11    Associated Diagnoses: Diabetic polyneuropathy associated with type 2 diabetes mellitus (H)      COMFORT EZ PEN NEEDLES 31G X 6 MM miscellaneous USE 4 DAILY OR AS DIRECTED  Qty: 100 each, Refills: 3    Associated Diagnoses: Type 2 diabetes mellitus (H)      !! order for DME Equipment being ordered: Tegaderm 4x4   MICHELLE 6 months  Sacral wound  Qty: 1 Box, Refills: 3    Associated Diagnoses: Decubitus ulcer of sacral region, unspecified ulcer stage      !! order for DME Equipment being ordered: SampalRx Iman gel-T cushion 20 inches wide & 18 inches deep    height 1.676 m (5' 6\"), weight 101.6 kg (223 lb 15.8 oz)  Qty: 1 each, Refills: 0    Associated Diagnoses: Back pain       !! - Potential duplicate medications found. Please discuss with provider.      STOP taking these medications       fluconazole (DIFLUCAN) 200 MG tablet Comments:   Reason for Stopping:             Allergies   Allergies   Allergen Reactions     Atorvastatin Calcium      myalgias, muscle aches     Codeine Nausea and Vomiting     Darvocet [Propoxyphene N-Apap] Nausea and Vomiting     Hydromorphone      Levofloxacin Other (See Comments) and Diarrhea     hallucinations     Morphine      Nausea and vomiting     Niaspan [Niacin] GI Disturbance     Flushing even at low dose, GI upset     Percocet [Oxycodone-Acetaminophen]      Vomiting after taking med couple of times     Vicodin [Hydrocodone-Acetaminophen] Nausea and Vomiting     Data   Most Recent 3 CBC's:  Recent Labs   Lab Test 04/26/19  0754 04/25/19  0811 04/24/19  1258   WBC 6.0 6.5 7.4   HGB 9.6* 11.2* 10.6*   MCV 92 93 93   * 118* 138*      Most Recent 3 BMP's:  Recent Labs   Lab Test 04/26/19  0754 04/25/19  0811 04/24/19  1258    145* 142   POTASSIUM 4.3 4.4 4.5   CHLORIDE 113* 115* 111*   CO2 16* 22 23   BUN 40* 41* 50*   CR 1.57* 1.65* 1.78* "   ANIONGAP 12 8 8   ROGER 8.3* 8.8 8.7   GLC 92 85 123*     Most Recent 2 LFT's:  Recent Labs   Lab Test 04/24/19  1258 02/28/19  0035   AST 11 31   ALT 22 31   ALKPHOS 103 103   BILITOTAL 0.3 0.4     Most Recent INR's and Anticoagulation Dosing History:  Anticoagulation Dose History     Recent Dosing and Labs Latest Ref Rng & Units 9/1/2016 9/2/2016 3/8/2017 4/26/2017 4/26/2017 11/6/2017 2/28/2019    INR 0.86 - 1.14 1.13 1.09 1.01 1.06 1.03 1.09 1.09        Most Recent 3 Troponin's:  Recent Labs   Lab Test 04/24/19  1258 03/01/19  1119 03/01/19  0043  02/28/19  0018   TROPI <0.015 <0.015 <0.015   < >  --    TROPONIN  --   --   --   --  0.00    < > = values in this interval not displayed.     Most Recent Cholesterol Panel:  Recent Labs   Lab Test 12/05/16  1210   CHOL 164   LDL 64   HDL 46*   TRIG 272*     Most Recent 6 Bacteria Isolates From Any Culture (See EPIC Reports for Culture Details):  Recent Labs   Lab Test 04/24/19  1405 04/24/19  1258 03/27/19  1216 03/01/19  0545 03/01/19  0532 02/28/19  0215   CULT >100,000 colonies/mL  Citrobacter freundii complex  * No growth after 3 days  No growth after 3 days >100,000 colonies/mL  Candida albicans / dubliniensis  Candida albicans and Candida dubliniensis are not routinely speciated  *  Susceptibility testing not routinely done No growth No growth 50,000 to 100,000 colonies/mL  Escherichia coli  *     Most Recent TSH, T4 and A1c Labs:  Recent Labs   Lab Test 04/24/19  1258  05/29/18  0440  03/09/17  1310   TSH  --   --  0.85   < > 1.20   T4  --   --   --   --  0.72*   A1C 6.2*   < >  --    < >  --     < > = values in this interval not displayed.       Results for orders placed or performed during the hospital encounter of 04/24/19   CT Head w/o Contrast    Narrative    CT OF THE HEAD WITHOUT CONTRAST 4/24/2019 1:14 PM     COMPARISON: Head CT 2/28/2019.    HISTORY: Altered level of consciousness (LOC), unexplained.    TECHNIQUE: 5 mm thick axial CT images of the  head were acquired  without IV contrast material.    FINDINGS:  There is moderate diffuse cerebral volume loss. There are  subtle patchy areas of decreased density in the cerebral white matter  bilaterally that are consistent with sequela of chronic small vessel  ischemic disease.     The ventricles and basal cisterns are within normal limits in  configuration given the degree of cerebral volume loss.  There is no  midline shift. There are no extra-axial fluid collections.     No intracranial hemorrhage, mass or recent infarct.    The visualized paranasal sinuses are well-aerated. There is no  mastoiditis. There are no fractures of the visualized bones.       Impression    IMPRESSION:  Diffuse cerebral volume loss and cerebral white matter  changes consistent with chronic small vessel ischemic disease. No  evidence for acute intracranial pathology.    Radiation dose for this scan was reduced using automated exposure  control, adjustment of the mA and/or kV according to patient size, or  iterative reconstruction technique.    SHAAN SEVILLA MD     *Note: Due to a large number of results and/or encounters for the requested time period, some results have not been displayed. A complete set of results can be found in Results Review.

## 2019-04-27 NOTE — PROGRESS NOTES
SW:  D:  Writer took a call today from Kiera Langley, nurse thru [facility]. [phone].    She reports she has spoken with the home care manage and [facility] will resume home care.  [facility] is not requiring that patient's spouse sign any contract or agreement.  Kiera spoke with patient's spouse to clarify home care will continue upon discharge from the hospital.  Bedside nurse Cynthia updated and she will notify Dr Robert.    Cynthia had a long conversation with  in which he explained his perception of the days leading up to her hospitalization.    P:  At time of discharge, writer or CC RN will notify [facility] of discharge orders.  Writer will leave the adult protection work a message regarding final discharge plan.    1600 Update:  Patient discharging today.  [facility] alerted.   requests stretcher transportation and bedside nurse concurs patient requires a stretcher as she is unable to support herself sitting up in a w/c.  Nurse and  aware the discharge time is 1700.  Message left for AP worker regarding discharge home today with resumption of [facility].  PCS and face sheet faxed to [facility].

## 2019-04-27 NOTE — PLAN OF CARE
DATE & TIME: 4/27/19  ORIENTATION: Alert and Oriented x4  BEHAVIOR & AGGRESSION TOOL COLOR: Green  CIWA SCORE: NA  ABNL VS/O2: VSS on RA, on CPAP at night/has her own home CPAP.   MOBILITY:Total care, turn and repo q2.  PAIN MANAGMENT: Denies   DIET: Mod Carb  BOWEL/BLADDER: Chronic Johnson in place, good output, small incontinent BM.  ABNL LAB/BG:  at 0200.   DRAIN/DEVICES: PIV x2, NS running at 75, Johnson draining well.    TELEMETRY RHYTHM:NA  SKIN: Blanchable redness to buttocks and groin.    TESTS/PROCEDURES:NA  D/C DAY/GOALS/PLACE: Pending   OTHER IMPORTANT INFO: NA

## 2019-04-27 NOTE — PROGRESS NOTES
Page out to Dr. Rogers regarding pt requesting PTA gabapentin dose be resumed. According to H&P, held on admission due to AMS. Pt currently alert and oriented x4.

## 2019-04-27 NOTE — PLAN OF CARE
A/O x4. Appears paranoid at times. Incontinent of lg soft BM. Johnson patent with adequate output. Fair appetite. Buttocks excoriated. Nystatin powder applied to skin folds. Glucoses 123 and 193. NS at 75 into PIV. Denies pain.

## 2019-04-28 NOTE — CONSULTS
Consult Date:  04/27/2019      INFECTIOUS DISEASE CONSULTATION      LOCATION:  Room 627, [facility]      REFERRING PHYSICIAN:  Catrachito Robert MD      IMPRESSION:   1. A 73-year-old female, very well known to me from multiple prior admissions, current admission with, among other things, urinary tract infection, probably symptomatic, including some degree of mental status changes, but not overtly septic and not bacteremic, sensitive gram-negative magdalene in the urine.   2. History of multiple recurrent urinary tract infections in the past, including resistant pathogens.   3. History of prior renal transplant with relatively intact renal function.   4. LISTED LEVAQUIN ALLERGY.  PREVIOUS LISTED SULFA ALLERGIC, BUT HAS BEEN ABLE TO TAKE BOTH HISTORICALLY.  Recently had a course of Cipro for a urinary tract infection and urosepsis at the [facility].   5. Chronic mild renal insufficiency.   6. Diabetes mellitus.   7. Seizure disorder.   8. Methicillin-resistant Staphylococcus aureus, vancomycin-resistant Enterococci and extended-spectrum beta-lactamase colonization.      RECOMMENDATIONS:   1. The organism has an SHEELA of 0.5 to Cipro, which listed as intermediate but probably an acceptable agent and likely safest here, rather than sulfa.  Would do a relatively shorter course given not particularly septic on this occasion.   2. Follow up with transplant and [facility].  Okay disposition from my standpoint.      HISTORY:  This 73-year-old female is very well known to me from multiple prior admissions.  We are currently consulted as she is about to go home to decide on oral antibiotic.  The patient on the current admission was not that septic.  Did have some mental status changes.  Only finding has been the abnormal urine.  She has a history of chronic and recurrent urinary tract infections, prior transplant, some degree of renal insufficiency and several prior episodes of sepsis.  Among her prior infections  have included MRSA, VRE and ESBL organisms.  The current relatively sensitive organisms clinically responded and her mentation is back to near baseline.      PAST MEDICAL HISTORY:  Multiple recurrent infections as above, including [facility] and bacteremia, history of prior kidney transplant.      SOCIAL AND FAMILY HISTORY:  No recent travels or exposures.  Had the multiple resistant pathogens as listed.      MEDICATIONS:  As listed.      REVIEW OF SYSTEMS:  Feels much better currently.  No particular suprapubic or abdominal pain.      PHYSICAL EXAMINATION:   GENERAL:  The patient looks like her usual self, is not toxic or ill.  Mentation back to baseline.   HEENT:  No thrush or intraoral lesions.   HEART AND LUNGS:  Unremarkable.   ABDOMEN:  No particular abdominal or flank tenderness.  Maybe a slight bit of suprapubic tenderness.   EXTREMITIES:  No rash or skin lesions.      LABORATORY DATA:  Urine grossly abnormal.  Blood cultures negative.  Current urine culture greater than 100,000 colonies of  Citrobacter.  This is a different organism than the last episode she had at the [facility].  The Cipro and Levaquin are both intermediate but relatively sensitive.  Bactrim sensitive, but renal failure with creatinine in the 1.5 range.      Thank you very much for the consultation.  I will follow the patient with you.            CÉSAR ELDER MD             D: 2019   T: 2019   MT: [id number]      Name:     MARYJANE SHAVER   MRN:      [id number]        Account:       [id number]   :      1945           Consult Date:  2019      Document: [id number]       cc: Marivel Barcenas APREILEEN, CNP

## 2019-04-29 ENCOUNTER — CARE COORDINATION (OUTPATIENT)
Dept: FAMILY MEDICINE | Facility: CLINIC | Age: 74
End: 2019-04-29

## 2019-04-29 ENCOUNTER — TELEPHONE (OUTPATIENT)
Dept: FAMILY MEDICINE | Facility: CLINIC | Age: 74
End: 2019-04-29

## 2019-04-29 DIAGNOSIS — Z53.9 DIAGNOSIS NOT YET DEFINED: Primary | ICD-10-CM

## 2019-04-29 PROCEDURE — G0179 MD RECERTIFICATION HHA PT: HCPCS | Performed by: FAMILY MEDICINE

## 2019-04-30 ENCOUNTER — PATIENT OUTREACH (OUTPATIENT)
Dept: CARE COORDINATION | Facility: CLINIC | Age: 74
End: 2019-04-30

## 2019-04-30 DIAGNOSIS — N39.0 URINARY TRACT INFECTION ASSOCIATED WITH INDWELLING URETHRAL CATHETER, SUBSEQUENT ENCOUNTER: ICD-10-CM

## 2019-04-30 DIAGNOSIS — G93.41 METABOLIC ENCEPHALOPATHY: Primary | ICD-10-CM

## 2019-04-30 DIAGNOSIS — T83.511D URINARY TRACT INFECTION ASSOCIATED WITH INDWELLING URETHRAL CATHETER, SUBSEQUENT ENCOUNTER: ICD-10-CM

## 2019-04-30 NOTE — PROGRESS NOTES
Clinic Care Coordination Contact  [facility]/Voicemail    Referral Source: Care Team(home care nurse)  Clinical Data: Care Coordinator Outreach  Outreach attempted x 1.  Left message on voicemail with call back information and requested return call from home care   Plan: Care Coordinator will try to reach patient's home care  again in 1-2 business days.    Porsha Khalil, SEKOU  [facility]-Care Coordination  [facility]  [phone]

## 2019-05-01 ENCOUNTER — TELEPHONE (OUTPATIENT)
Dept: FAMILY MEDICINE | Facility: CLINIC | Age: 74
End: 2019-05-01

## 2019-05-01 NOTE — TELEPHONE ENCOUNTER
Return call to [facility] Home Care Nurse and left message with verbal orders as requested    Closing encounter - no further actions needed at this time    Carol Meneses RN

## 2019-05-01 NOTE — TELEPHONE ENCOUNTER
Reason for Call:  Home Health Care    Richie with [facility] called regarding:     Orders are needed for this patient.     Skilled Nursing: once a week X 1 week, twice a week X 1 week, then once a week X weeks, and 4 as needed.     :  To eval     Home Health Aide: twice a week X 1 week, 3 times a week X 4 weeks, then twice a week X 1 week     Pt Provider: Klaudia Barcenas     Phone Number Homecare Nurse can be reached at: [phone]    Can we leave a detailed message on this number? YES    Phone number patient can be reached at: Home number on file [phone] (home)    Best Time: anytime     Call taken on 5/1/2019 at 8:56 AM by Rich Henderson

## 2019-05-01 NOTE — TELEPHONE ENCOUNTER
Marivel Barcenas, APRN CNP  [facility] All Nurse Pool Cc: [facility] Reception Pool             Please schedule a visit with me on Friday 5/3 at noon, this will be a home visit     Klaudia

## 2019-05-01 NOTE — TELEPHONE ENCOUNTER
Routing to [facility] Reception - can you help assist with scheduling please?    Thank you,  Carol Meneses RN

## 2019-05-02 ENCOUNTER — TELEPHONE (OUTPATIENT)
Dept: FAMILY MEDICINE | Facility: CLINIC | Age: 74
End: 2019-05-02

## 2019-05-02 NOTE — TELEPHONE ENCOUNTER
Home health certification and plan of care faxed to  home care and hospice at [phone].  Cecilia Hernandez MA

## 2019-05-03 ENCOUNTER — OFFICE VISIT (OUTPATIENT)
Dept: FAMILY MEDICINE | Facility: CLINIC | Age: 74
End: 2019-05-03
Payer: MEDICARE

## 2019-05-03 ENCOUNTER — TELEPHONE (OUTPATIENT)
Dept: TRANSPLANT | Facility: CLINIC | Age: 74
End: 2019-05-03

## 2019-05-03 VITALS — SYSTOLIC BLOOD PRESSURE: 150 MMHG | DIASTOLIC BLOOD PRESSURE: 50 MMHG

## 2019-05-03 DIAGNOSIS — Z09 HOSPITAL DISCHARGE FOLLOW-UP: Primary | ICD-10-CM

## 2019-05-03 DIAGNOSIS — F51.5 NIGHTMARES: ICD-10-CM

## 2019-05-03 DIAGNOSIS — I10 BENIGN ESSENTIAL HYPERTENSION: ICD-10-CM

## 2019-05-03 DIAGNOSIS — Z94.0 KIDNEY REPLACED BY TRANSPLANT: ICD-10-CM

## 2019-05-03 DIAGNOSIS — E11.65 TYPE 2 DIABETES MELLITUS WITH HYPERGLYCEMIA, WITH LONG-TERM CURRENT USE OF INSULIN (H): ICD-10-CM

## 2019-05-03 DIAGNOSIS — N39.0 RECURRENT UTI: ICD-10-CM

## 2019-05-03 DIAGNOSIS — Z79.4 TYPE 2 DIABETES MELLITUS WITH HYPERGLYCEMIA, WITH LONG-TERM CURRENT USE OF INSULIN (H): ICD-10-CM

## 2019-05-03 DIAGNOSIS — T14.8XXA SKIN ABRASION: ICD-10-CM

## 2019-05-03 PROCEDURE — 99350 HOME/RES VST EST HIGH MDM 60: CPT | Performed by: NURSE PRACTITIONER

## 2019-05-03 RX ORDER — AMLODIPINE BESYLATE 5 MG/1
10 TABLET ORAL DAILY
Qty: 60 TABLET | Refills: 3 | Status: SHIPPED | OUTPATIENT
Start: 2019-05-03 | End: 2019-01-01

## 2019-05-03 RX ORDER — PRAZOSIN HYDROCHLORIDE 1 MG/1
1 CAPSULE ORAL AT BEDTIME
Qty: 30 CAPSULE | Refills: 1 | Status: SHIPPED | OUTPATIENT
Start: 2019-05-03 | End: 2019-06-14

## 2019-05-03 RX ORDER — MENTHOL AND ZINC OXIDE .44; 20.625 G/100G; G/100G
OINTMENT TOPICAL 4 TIMES DAILY PRN
COMMUNITY
Start: 2019-05-03

## 2019-05-03 NOTE — PROGRESS NOTES
SUBJECTIVE:   Gem Jade is a 73 year old female who is seen in her home, due to homebound status, bedbound, for the following   health issues:    Hospital Follow-up Visit:    Hospital/Nursing Home/IP Rehab Facility: [facility]  Date of Admission: 4/24/19  Date of Discharge: 4/27/19  Reason(s) for Admission: Recurrent UTI            Problems taking medications regularly:  None       Medication changes since discharge: increased her lantus to pre hospital level, restarted gabapentin, and baclofen, started prazocin for nightmares, increased Norvasc for elevated BP        Problems adhering to non-medication therapy:  None    Summary of hospitalization:  [facility] discharge summary reviewed  Diagnostic Tests/Treatments reviewed.  Follow up needed: none  Other Healthcare Providers Involved in Patient s Care:         Homecare  Update since discharge: fluctuating course. See notes below     Post Discharge Medication Reconciliation: discharge medications reconciled and changed, per note/orders (see AVS).  Plan of care communicated with patient and caregiver     Coding guidelines for this visit:  Type of Medical   Decision Making Face-to-Face Visit       within 7 Days of discharge Face-to-Face Visit        within 14 days of discharge   Moderate Complexity [id number]   High Complexity [id number]          Discharge Summary  Hospitalist     Date of Admission:  4/24/2019  Date of Discharge:  4/27/2019  Discharging Provider: Catrachito Robert MD        Discharge Diagnoses        Catheter associated UTI  Chronic indwelling urinary catheter.  Acute metabolic encephalopathy.   DM2.  Chronic kidney disease status 3  History of diabetic nephropathy with end-stage renal disease, status post live donor kidney transplant in 1999.  Hypertension.    Hyperlipidemia.    Hypothyroidism.   History of seizure disorder.    GERD.  Chronic anemia.  History of diabetic neuropathy and back pain.    ALLAN (patient uses  CPAP).     Hospital Course:     Gem Jade is a 73-year-old female with PMHx significant for chronic indwelling Johnson catheter with recurrent UTIs, history of MRSA, VRE and ESBL infections, renal transplant, chronic anemia, DM2, HTN, HLP, ALLAN, hypothyroidism, seizure disorder, diabetic gastroparesis, diabetic neuropathy, diabetic retinopathy, GERD, osteopenia, provoked PE (after knee replacement in 2003), paroxysmal atrial fibrillation, and OA  who presented with altered mental status. She was admitted for further management.     Complicated urinary tract infection (CAUTI)  Chronic urinary catheter.  The patient has chronic indwelling Johnson catheter for being bedridden with prior frequent UTIs, including ESBL. She presented with altered mental status. CT head negative for acute intracranial pathology. She did receive ceftriaxone in ED, then switched to IV ertapenem upon admission.   -Received 4 days of IV antibiotics in the hospital  -Infectious disease was consulted for antibiotic recommendation, Dr. Olmedo recommended Cipro for 6 days at discharge.  Confirmed with  no allergies to ciprofloxacin.  She had received a month ago.  -Blood culture negative, urine culture positive for Citrobacter  -Continue PTA lactobacillus  -Resume home health care at discharge.     Acute metabolic encephalopathy.   Likely due to UTI and dehydration. It is improving.  -Continue management for UTI as above  -Back to baseline per  on the day of discharge.     DM2.  [PTA: Lantus 30 units subcutaneous at bedtime, Novolog 10 units with meals as well as sliding scale]  Hgb A1c 6.2 on 04/24.  -Resumed Lantus at lower dose of 15 units subcutaneous hs; this can be uptitrated in the outpatient setting -Continue sliding scale insulin  -Diabetic diet     History of diabetic nephropathy with end-stage renal disease, status post live donor kidney transplant in 1999.  [PTA: Prednisone 5 mg po daily, tacrolimus 2 mg po bid]  Baseline  cr recently in the range of 1.6-2.2. CR 1. 5 7 on the day of discharge.    -Continue  PTA prednisone and tacrolimus  -Continue to monitor renal function periodically.  Recommend rechecking in 1 week.  -Avoid NSAIDs and nephrotoxins      Hypertension.    [PTA: Amlodipine 5 mg po daily, labetalol 150 mg po bid]  BP slightly elevated.  -Continue PTA BP meds  -IV hydralazine prn  -Monitor BP     Hyperlipidemia.    -Resume PTA pravastatin 10 mg po daily      Hypothyroidism.   -Continue PTA levothyroxine 50 mcg po daily      History of seizure disorder.    [PTA: Keppra 750 mg po bid]  -Continue PTA Keppra     GERD.  [PTA: Famotidine 20 mg po bid]  -Continue PTA famotidine     Chronic anemia.  Baseline Hgb 9-11.  Hgb 10.6 on admission. Drop in Hgb likely due to hemodilution.  Stable     History of diabetic neuropathy and back pain.    -Continue to hold PTA Neurontin and baclofen given encephalopathy     ALLAN (patient uses CPAP).  -Continue with CPAP at night    Follow-up and recommended labs and tests     Follow up with primary care provider, Marivel Barcenas NP, within 7 days for hospital follow- up.  The following labs/tests are recommended: CBC and BMP in 1 week.      Diabetes Follow-up    Patient is checking blood sugars: three times daily.   Blood sugar testing frequency justification: Adjustment of medication(s), Lantus decreased in the hospital to 15u at night, back up to 30u of lantus now, using sliding scale as needed  Results are as follows: forgot to take Novolog for the suppertime one time          am - 200         lunchtime - 244, 260         suppertime - 492    Diabetic concerns: frequent infections and blood sugar frequently over 200     Symptoms of hypoglycemia (low blood sugar): none     Paresthesias (numbness or burning in feet) or sores: Yes held gabapentin in the hospital, now taking 2 tabs 2x daily and an occ. Third tab as needed      Date of last diabetic eye exam: unsure    BP Readings from  Last 2 Encounters:   04/27/19 157/57   03/05/19 166/58     Hemoglobin A1C (%)   Date Value   04/24/2019 6.2 (H)   02/28/2019 7.3 (H)     LDL Cholesterol Calculated (mg/dL)   Date Value   12/05/2016 64   02/05/2015 18       Diabetes Management Resources  Hypertension Follow-up      Outpatient blood pressures are being checked at home.  Results are 164/65, 149/66, 135/58, 165/80.    Still elevated discussed increasing her norvasc to 10mg daily     Low Salt Diet: no added salt      Mental Health Symptoms      Duration: Recent increase in nightmares since getting home from the hospital, scaring her, called 911 the other day fearful that people were in her home, police arrived and reassured her, nightmares almost nightly       Description:  Depression: no  Anxiety: YES  Sleep problems: YES- nightmares  Concentration problems: YES    Therapies tried and outcome: willing to try Prazosin     See PHQ-9 and LISSY scores, as appropriate      Joint or Musculoskeletal Pain  Duration of complaint: left shoulder pain and knees, discussed using lidocaine or an aspercream as needed, continue chinese patches as needed     Additional history: as documented    Reviewed  and updated as needed this visit by clinical staff         Reviewed and updated as needed this visit by Provider         Current Outpatient Medications   Medication Sig Dispense Refill     amLODIPine (NORVASC) 5 MG tablet Take 2 tablets (10 mg) by mouth daily 60 tablet 3     menthol-zinc oxide (CALMOSEPTINE) 0.44-20.625 % OINT ointment Apply topically 4 times daily as needed for skin protection       prazosin (MINIPRESS) 1 MG capsule Take 1 capsule (1 mg) by mouth At Bedtime 30 capsule 1     ACETAMINOPHEN PO Take 325-650 mg by mouth every 4 hours as needed.       aspirin 81 MG tablet Take 81 mg by mouth daily       baclofen (LIORESAL) 10 MG tablet Take 1 tablet (10 mg) by mouth 2 times daily as needed for other (Bladder Spasms) 60 tablet 3     blood glucose (ACCU-CHEK  COMPACT PLUS) test strip USE TO TEST BLOOD SUGAR THREE TIMES A  each 11     ciprofloxacin (CIPRO) 500 MG tablet Take 1 tablet (500 mg) by mouth 2 times daily for 6 days 12 tablet 0     COMFORT EZ PEN NEEDLES 31G X 6 MM miscellaneous USE 4 DAILY OR AS DIRECTED 100 each 3     Cranberry 400 MG TABS Take 400 mg by mouth 2 times daily       cyanocobalamin (VITAMIN B12) 1000 MCG/ML injection Inject 1 mL (1,000 mcg) into the muscle every 30 days 3 mL 3     diphenoxylate-atropine (LOMOTIL) 2.5-0.025 MG tablet Take 1-2 tablets by mouth 4 times daily as needed for diarrhea 90 tablet 0     famotidine (PEPCID) 20 MG tablet Take 1 tablet (20 mg) by mouth 2 times daily 60 tablet 1     gabapentin (NEURONTIN) 300 MG capsule Take 2 capsules (600 mg) by mouth 2 times daily May take an additional 300mg at bedtime daily with increase pain 360 capsule 3     insulin aspart (NOVOLOG FLEXPEN) 100 UNIT/ML pen Take 10 units with meals twice daily with sliding scale. Max 40 units per day  Sliding scale:   140-239=2 units  240-339=4 units  340-439=6 units  >440=8 units 45 mL 3     insulin glargine (LANTUS SOLOSTAR PEN) 100 UNIT/ML pen Inject 15 Units Subcutaneous At Bedtime 45 mL 3     labetalol (NORMODYNE) 300 MG tablet Take 0.5 tablets (150 mg) by mouth 2 times daily 180 tablet 3     Lactobacillus Rhamnosus, GG, (CULTURELL) capsule Take 1 capsule by mouth 2 times daily 60 capsule 11     levETIRAcetam (KEPPRA) 750 MG tablet Take 1 tablet (750 mg) by mouth 2 times daily 60 tablet 11     levothyroxine (SYNTHROID/LEVOTHROID) 50 MCG tablet TAKE ONE TABLET BY MOUTH EVERY DAY 90 tablet 1     lidocaine (XYLOCAINE) 2 % topical gel Apply topically as needed for moderate pain 10 ML every 3 weeks 30 mL 11     loratadine (CLARITIN) 10 MG tablet Take 1 tablet (10 mg) by mouth daily 90 tablet 3     ondansetron (ZOFRAN ODT) 4 MG ODT tab Take 1 tablet (4 mg) by mouth daily as needed for nausea 30 minutes before getting up in your wheelchair 20 tablet  "1     order for DME Equipment being ordered: Tegaderm 4x4   MICHELLE 6 months  Sacral wound 1 Box 3     order for DME Equipment being ordered: [employer] gel-T cushion 20 inches wide & 18 inches deep    height 1.676 m (5' 6\"), weight 101.6 kg (223 lb 15.8 oz) 1 each 0     pravastatin (PRAVACHOL) 10 MG tablet Take 1 tablet (10 mg) by mouth daily 90 tablet 3     predniSONE (DELTASONE) 5 MG tablet Take 1 tablet (5 mg) by mouth daily 90 tablet 3     simethicone (MYLICON) 125 MG CHEW Take 1 tablet (125 mg) by mouth as needed for intestinal gas 120 tablet      tacrolimus (GENERIC EQUIVALENT) 1 MG capsule Take 2 capsules (2 mg) by mouth 2 times daily 120 capsule 11     VITAMIN D, CHOLECALCIFEROL, PO Take 4,000 Units by mouth 2 times daily       Recent Labs   Lab Test 04/26/19  0754 04/25/19  0811 04/24/19  1258  02/28/19  0612  02/28/19  0035  09/17/18  1251 09/16/18  0751  05/29/18  0440  05/25/18  0900  12/05/16  1210  02/05/15  1250  05/16/13  1240   A1C  --   --  6.2*  --  7.3*  --   --   --   --  8.2*  --   --    < >  --    < > 7.4*   < > 6.9*   < > 7.2*   LDL  --   --   --   --   --   --   --   --   --   --   --   --   --   --   --  64  --  18  --  149*   HDL  --   --   --   --   --   --   --   --   --   --   --   --   --   --   --  46*  --  43*  --  42*   TRIG  --   --   --   --   --   --   --   --   --   --   --   --   --   --   --  272*  --  223*  --  262*   ALT  --   --  22  --   --   --  31  --  9  --    < >  --    < >  --    < >  --    < >  --    < >  --    CR 1.57* 1.65* 1.78*   < > 2.26*   < > 2.27*   < > 1.72* 1.88*   < > 1.39*   < >  --    < >  --    < > 0.67   < > 0.70   GFRESTIMATED 33* 31* 28*   < > 21*   < > 21*   < > 29* 26*   < > 37*   < >  --    < >  --    < > 87   < > 84   GFRESTBLACK 38* 35* 32*   < > 24*   < > 24*   < > 35* 32*   < > 45*   < >  --    < >  --    < > >90   GFR Calc     < > >90   POTASSIUM 4.3 4.4 4.5   < > 5.5*   < > 6.3*  6.4*   < > 3.8 4.2   < > 4.0   < >  --    < >  " --    < > 4.3   < > 3.9   TSH  --   --   --   --   --   --   --   --   --   --   --  0.85  --  0.94   < >  --    < >  --    < >  --     < > = values in this interval not displayed.      BP Readings from Last 3 Encounters:   05/03/19 150/50   04/27/19 157/57   03/05/19 166/58    Wt Readings from Last 3 Encounters:   04/27/19 107.8 kg (237 lb 10.5 oz)   03/03/19 113 kg (249 lb 1.9 oz)   09/17/18 101.3 kg (223 lb 4.8 oz)                  Labs reviewed in EPIC    ROS:  Constitutional, HEENT, cardiovascular, pulmonary, gi and gu systems are negative, except as otherwise noted.    OBJECTIVE:     /50 (BP Location: Left arm, Patient Position: Supine)   There is no height or weight on file to calculate BMI.  GENERAL:  alert and mild distress, able to answer questions appropriately   RESP: lungs clear to auscultation - no rales, rhonchi or wheezes  CV: regular rate and rhythm, normal S1 S2, no S3 or S4, no murmur, click or rub,   ABDOMEN: soft, nontender, no hepatosplenomegaly, no masses and bowel sounds normal  MS: no gross musculoskeletal defects noted, no edema, limited ROM in shoulders   PSYCH: mentation intact, mood normal     Diagnostic Test Results:  Results for orders placed or performed during the hospital encounter of 04/24/19   CT Head w/o Contrast    Narrative    CT OF THE HEAD WITHOUT CONTRAST 4/24/2019 1:14 PM     COMPARISON: Head CT 2/28/2019.    HISTORY: Altered level of consciousness (LOC), unexplained.    TECHNIQUE: 5 mm thick axial CT images of the head were acquired  without IV contrast material.    FINDINGS:  There is moderate diffuse cerebral volume loss. There are  subtle patchy areas of decreased density in the cerebral white matter  bilaterally that are consistent with sequela of chronic small vessel  ischemic disease.     The ventricles and basal cisterns are within normal limits in  configuration given the degree of cerebral volume loss.  There is no  midline shift. There are no extra-axial  fluid collections.     No intracranial hemorrhage, mass or recent infarct.    The visualized paranasal sinuses are well-aerated. There is no  mastoiditis. There are no fractures of the visualized bones.       Impression    IMPRESSION:  Diffuse cerebral volume loss and cerebral white matter  changes consistent with chronic small vessel ischemic disease. No  evidence for acute intracranial pathology.    Radiation dose for this scan was reduced using automated exposure  control, adjustment of the mA and/or kV according to patient size, or  iterative reconstruction technique.    SHAAN SEVILLA MD   CBC with platelets differential   Result Value Ref Range    WBC 7.4 4.0 - 11.0 10e9/L    RBC Count 3.59 (L) 3.8 - 5.2 10e12/L    Hemoglobin 10.6 (L) 11.7 - 15.7 g/dL    Hematocrit 33.3 (L) 35.0 - 47.0 %    MCV 93 78 - 100 fl    MCH 29.5 26.5 - 33.0 pg    MCHC 31.8 31.5 - 36.5 g/dL    RDW 14.7 10.0 - 15.0 %    Platelet Count 138 (L) 150 - 450 10e9/L    Diff Method Automated Method     % Neutrophils 68.7 %    % Lymphocytes 20.0 %    % Monocytes 8.4 %    % Eosinophils 2.7 %    % Basophils 0.1 %    % Immature Granulocytes 0.1 %    Nucleated RBCs 0 0 /100    Absolute Neutrophil 5.0 1.6 - 8.3 10e9/L    Absolute Lymphocytes 1.5 0.8 - 5.3 10e9/L    Absolute Monocytes 0.6 0.0 - 1.3 10e9/L    Absolute Eosinophils 0.2 0.0 - 0.7 10e9/L    Absolute Basophils 0.0 0.0 - 0.2 10e9/L    Abs Immature Granulocytes 0.0 0 - 0.4 10e9/L    Absolute Nucleated RBC 0.0    Comprehensive metabolic panel   Result Value Ref Range    Sodium 142 133 - 144 mmol/L    Potassium 4.5 3.4 - 5.3 mmol/L    Chloride 111 (H) 94 - 109 mmol/L    Carbon Dioxide 23 20 - 32 mmol/L    Anion Gap 8 3 - 14 mmol/L    Glucose 123 (H) 70 - 99 mg/dL    Urea Nitrogen 50 (H) 7 - 30 mg/dL    Creatinine 1.78 (H) 0.52 - 1.04 mg/dL    GFR Estimate 28 (L) >60 mL/min/[1.73_m2]    GFR Estimate If Black 32 (L) >60 mL/min/[1.73_m2]    Calcium 8.7 8.5 - 10.1 mg/dL    Bilirubin Total 0.3 0.2 -  1.3 mg/dL    Albumin 2.9 (L) 3.4 - 5.0 g/dL    Protein Total 7.5 6.8 - 8.8 g/dL    Alkaline Phosphatase 103 40 - 150 U/L    ALT 22 0 - 50 U/L    AST 11 0 - 45 U/L   Troponin I   Result Value Ref Range    Troponin I ES <0.015 0.000 - 0.045 ug/L   UA with Microscopic   Result Value Ref Range    Color Urine Yellow     Appearance Urine Cloudy     Glucose Urine 30 (A) NEG^Negative mg/dL    Bilirubin Urine Negative NEG^Negative    Ketones Urine Negative NEG^Negative mg/dL    Specific Gravity Urine 1.015 1.003 - 1.035    Blood Urine Moderate (A) NEG^Negative    pH Urine 6.0 5.0 - 7.0 pH    Protein Albumin Urine 100 (A) NEG^Negative mg/dL    Urobilinogen mg/dL Normal 0.0 - 2.0 mg/dL    Nitrite Urine Negative NEG^Negative    Leukocyte Esterase Urine Large (A) NEG^Negative    Source Midstream Urine     WBC Urine >182 (H) 0 - 5 /HPF    RBC Urine 0 0 - 2 /HPF    WBC Clumps Present (A) NEG^Negative /HPF   Lactic acid whole blood   Result Value Ref Range    Lactic Acid 0.9 0.7 - 2.0 mmol/L   Hemoglobin A1c   Result Value Ref Range    Hemoglobin A1C 6.2 (H) 0 - 5.6 %   Glucose by meter   Result Value Ref Range    Glucose 104 (H) 70 - 99 mg/dL   Basic metabolic panel   Result Value Ref Range    Sodium 145 (H) 133 - 144 mmol/L    Potassium 4.4 3.4 - 5.3 mmol/L    Chloride 115 (H) 94 - 109 mmol/L    Carbon Dioxide 22 20 - 32 mmol/L    Anion Gap 8 3 - 14 mmol/L    Glucose 85 70 - 99 mg/dL    Urea Nitrogen 41 (H) 7 - 30 mg/dL    Creatinine 1.65 (H) 0.52 - 1.04 mg/dL    GFR Estimate 31 (L) >60 mL/min/[1.73_m2]    GFR Estimate If Black 35 (L) >60 mL/min/[1.73_m2]    Calcium 8.8 8.5 - 10.1 mg/dL   CBC with platelets   Result Value Ref Range    WBC 6.5 4.0 - 11.0 10e9/L    RBC Count 3.80 3.8 - 5.2 10e12/L    Hemoglobin 11.2 (L) 11.7 - 15.7 g/dL    Hematocrit 35.5 35.0 - 47.0 %    MCV 93 78 - 100 fl    MCH 29.5 26.5 - 33.0 pg    MCHC 31.5 31.5 - 36.5 g/dL    RDW 14.8 10.0 - 15.0 %    Platelet Count 118 (L) 150 - 450 10e9/L   Glucose by  meter   Result Value Ref Range    Glucose 83 70 - 99 mg/dL   Glucose by meter   Result Value Ref Range    Glucose 60 (L) 70 - 99 mg/dL   Glucose by meter   Result Value Ref Range    Glucose 112 (H) 70 - 99 mg/dL   Glucose by meter   Result Value Ref Range    Glucose 81 70 - 99 mg/dL   Glucose by meter   Result Value Ref Range    Glucose 89 70 - 99 mg/dL   Glucose by meter   Result Value Ref Range    Glucose 102 (H) 70 - 99 mg/dL   CBC with platelets   Result Value Ref Range    WBC 6.0 4.0 - 11.0 10e9/L    RBC Count 3.31 (L) 3.8 - 5.2 10e12/L    Hemoglobin 9.6 (L) 11.7 - 15.7 g/dL    Hematocrit 30.6 (L) 35.0 - 47.0 %    MCV 92 78 - 100 fl    MCH 29.0 26.5 - 33.0 pg    MCHC 31.4 (L) 31.5 - 36.5 g/dL    RDW 14.6 10.0 - 15.0 %    Platelet Count 117 (L) 150 - 450 10e9/L   Basic metabolic panel   Result Value Ref Range    Sodium 141 133 - 144 mmol/L    Potassium 4.3 3.4 - 5.3 mmol/L    Chloride 113 (H) 94 - 109 mmol/L    Carbon Dioxide 16 (L) 20 - 32 mmol/L    Anion Gap 12 3 - 14 mmol/L    Glucose 92 70 - 99 mg/dL    Urea Nitrogen 40 (H) 7 - 30 mg/dL    Creatinine 1.57 (H) 0.52 - 1.04 mg/dL    GFR Estimate 33 (L) >60 mL/min/[1.73_m2]    GFR Estimate If Black 38 (L) >60 mL/min/[1.73_m2]    Calcium 8.3 (L) 8.5 - 10.1 mg/dL   Glucose by meter   Result Value Ref Range    Glucose 91 70 - 99 mg/dL   Glucose by meter   Result Value Ref Range    Glucose 92 70 - 99 mg/dL   Glucose by meter   Result Value Ref Range    Glucose 97 70 - 99 mg/dL   Glucose by meter   Result Value Ref Range    Glucose 123 (H) 70 - 99 mg/dL   Glucose by meter   Result Value Ref Range    Glucose 194 (H) 70 - 99 mg/dL   Glucose by meter   Result Value Ref Range    Glucose 163 (H) 70 - 99 mg/dL   Glucose by meter   Result Value Ref Range    Glucose 101 (H) 70 - 99 mg/dL   Glucose by meter   Result Value Ref Range    Glucose 158 (H) 70 - 99 mg/dL   EKG 12-lead, tracing only   Result Value Ref Range    Interpretation ECG Click View Image link to view waveform  and result    Care Transition RN/SW IP Consult    Narrative    Heraclio Roman RN     4/26/2019  4:08 PM  Care Transition Initial Assessment - RN    Care Coordinator met with patient as she is high risk for   readmission and to discuss discharge plans. Discussed  home care   concerns regarding spouse not initiating medical help timely    when patient has decreased responsiveness. Patient stating she   did not feel it is an issue and she is getting the appropriate   care. Writer received a call from [facility] Liaison who   stated she  has spoken to patients spouse and voiced their   concerns and stated they will not accept patient unless spouse   signs an agreement that he will call for medical help   appropriately.   Patient stating she would prefer returning to her home. She has   all devices available for mobility and transfers and pressure   reduction mattress. Patient is basically w/c and bed bound. Plan   on discharge Sunday if medically stable.     Blood culture   Result Value Ref Range    Specimen Description Blood Left Arm     Special Requests       Received in aerobic bottle only  TWO AEROBIC BOTTLES RECIEVED      Culture Micro No growth    Blood culture   Result Value Ref Range    Specimen Description Blood Right Wrist     Special Requests Aerobic and anaerobic bottles received     Culture Micro No growth    Urine Culture Aerobic Bacterial   Result Value Ref Range    Specimen Description Catheterized Urine     Special Requests Specimen received in preservative     Culture Micro (A)      >100,000 colonies/mL  Citrobacter freundii complex         Susceptibility    Citrobacter freundii complex - SHEELA     CEFAZOLIN* >=64 Resistant ug/mL      * Cefazolin SHEELA breakpoints are for the treatment of uncomplicated urinary tract infections.  For the treatment of systemic infections, please contact the laboratory for additional testing.Intrinsically Resistant     CEFOXITIN* >=64 Resistant ug/mL      *  Intrinsically Resistant     CEFTAZIDIME <=1 Sensitive ug/mL     CEFTRIAXONE <=1 Sensitive ug/mL     CIPROFLOXACIN 0.5 Intermediate ug/mL     GENTAMICIN <=1 Sensitive ug/mL     LEVOFLOXACIN 1 Intermediate ug/mL     NITROFURANTOIN <=16 Sensitive ug/mL     TOBRAMYCIN <=1 Sensitive ug/mL     Trimethoprim/Sulfa <=1/19 Sensitive ug/mL     Piperacillin/Tazo <=4 Sensitive ug/mL     CEFEPIME <=1 Sensitive ug/mL     *Note: Due to a large number of results and/or encounters for the requested time period, some results have not been displayed. A complete set of results can be found in Results Review.       ASSESSMENT/PLAN:     (Z09) Hospital discharge follow-up  (primary encounter diagnosis)  Comment: improving, mentation almost back to baseline, Bp still elevated, increase in nightmares   Plan: **Basic metabolic panel FUTURE anytime, CBC         with platelets and differential        Repeat labs as requested at discharge, will increase Norvasc and start Prazosin     (N39.0) Recurrent UTI  Comment: no more sx, urine clear and yellow   Plan: **Basic metabolic panel FUTURE anytime, CBC         with platelets and differential        Labs repeated, encouraged fluids but not too much as she get hyponatremic easily      (Z94.0) Kidney replaced by transplant  Comment: stable, followed by nephrology  Plan: **Basic metabolic panel FUTURE anytime, CBC         with platelets and differential        Repeat labs by [facility] RN, followed by nephrology    (E11.65,  Z79.4) Type 2 diabetes mellitus with hyperglycemia, with long-term current use of insulin (H)  Comment: elevated sugars, missed some novolog doses  Plan: increase Lantus to 30u and take Novolog 3x daily as directed     (I10) Benign essential hypertension  Comment: systolic still elevated   Plan: amLODIPine (NORVASC) 5 MG tablet        Increase Norvasc to 10mg daily    (F51.5) Nightmares  Comment: increase and causing anxiety and irrational behavior   Plan: prazosin (MINIPRESS) 1 MG  capsule        Will start prazosin 1mg at bedtime and monitor BP for hypotension     (T14.8XXA) Skin abrasion  Comment: chronic off and on, currently healed   Plan: menthol-zinc oxide (CALMOSEPTINE) 0.44-20.625 %        OINT ointment        Continue ointment as needed     Patient Instructions   We will have you return to your home dose of insulin, please talk to PharmD on Tuesday     We will have you return to your home dose of gabapentin and restart your baclofen on an as needed     We will increase your Norvasc to 10mg daily    We will start you on Prazocin 1mg at bedtime to help with your nightmares       Marivel Barcenas, ALISON, APRN [facility] PRIMARY [facility]

## 2019-05-03 NOTE — PATIENT INSTRUCTIONS
We will have you return to your home dose of insulin, please talk to PharmD on Monday     We will have you return to your home dose of gabapentin and restart your baclofen on an as needed     We will increase your Norvasc to 10mg daily

## 2019-05-03 NOTE — TELEPHONE ENCOUNTER
Provider Call: Transplant Provider  Route to RN  Facility Name:  Home Care.  SEKOU Wright Case Manager [phone]    Reason for Call: Update, discuss labs and draws  Callback needed? Yes    Return Call Needed  Same as documented in contacts section  When to return call?: Greater than one day: Route standard priority

## 2019-05-04 DIAGNOSIS — G40.909 SEIZURE DISORDER (H): ICD-10-CM

## 2019-05-06 NOTE — TELEPHONE ENCOUNTER
This message was routed to [facility] for a RX authorization. Lab doesn't do these.     Thank you,  Faith Fortune MLT (AN LAB)

## 2019-05-07 ENCOUNTER — ALLIED HEALTH/NURSE VISIT (OUTPATIENT)
Dept: PHARMACY | Facility: CLINIC | Age: 74
End: 2019-05-07
Payer: COMMERCIAL

## 2019-05-07 DIAGNOSIS — I10 BENIGN ESSENTIAL HYPERTENSION: ICD-10-CM

## 2019-05-07 DIAGNOSIS — F51.5 NIGHTMARES: ICD-10-CM

## 2019-05-07 DIAGNOSIS — E11.42 TYPE 2 DIABETES MELLITUS WITH DIABETIC POLYNEUROPATHY, WITH LONG-TERM CURRENT USE OF INSULIN (H): Primary | ICD-10-CM

## 2019-05-07 DIAGNOSIS — Z79.4 TYPE 2 DIABETES MELLITUS WITH DIABETIC POLYNEUROPATHY, WITH LONG-TERM CURRENT USE OF INSULIN (H): Primary | ICD-10-CM

## 2019-05-07 PROCEDURE — 99606 MTMS BY PHARM EST 15 MIN: CPT | Performed by: PHARMACIST

## 2019-05-07 PROCEDURE — 99607 MTMS BY PHARM ADDL 15 MIN: CPT | Performed by: PHARMACIST

## 2019-05-07 RX ORDER — LEVETIRACETAM 750 MG/1
750 TABLET ORAL 2 TIMES DAILY
Qty: 60 TABLET | Refills: 2 | Status: ON HOLD | OUTPATIENT
Start: 2019-05-07 | End: 2019-07-21

## 2019-05-07 NOTE — PROGRESS NOTES
SUBJECTIVE/OBJECTIVE:                Gem Jade is a 73 year old female called for a transitions of care visit.  She was discharged from [facility] on 19 for   Catheter associated UTI  Chronic indwelling urinary catheter.  Acute metabolic encephalopathy.   DM2.  Chronic kidney disease status 3  History of diabetic nephropathy with end-stage renal disease, status post live donor kidney transplant in .  Hypertension.    Hyperlipidemia.    Hypothyroidism.   History of seizure disorder.    GERD.  Chronic anemia.  History of diabetic neuropathy and back pain.    ALLAN (patient uses CPAP).      Chief Complaint: Blood sugars are high.    Tobacco: History of tobacco dependence- quit  Alcohol: not currently using    Medication Adherence/Access:  Patient has assistance with medication administration and setting up boxes ( Keshawn).     Hypertension: Currently taking amlodipine 10mg daily, labetalol 150mg twice daily, prazosin 1mg HS (for nightmares). No side effects reported or low BP readings. Blood pressures are being checked by HCRN and self. Using wrist cuff but often checks it against HCRN cuff and seems to be accurate.   Reports systolic typically 140-160s over diastolic 50s.   Today 135/52    Diabetes:  Pt currently taking Lantus 30u daily and Novolog 10 units + 2-8 units sliding scale with dinner. Uses sliding scale with breakfast and at bedtime  Pt is not experiencing side effects.   Sliding scale:   140-239=2 units  240-339=4 units  340-439=6 units  >440=8 units  SMB-3 times daily.   Ranges (pt reported):   Today fasting 371  Yesterday 479, 332, 181  5/5 380, 417, 306  Patient is not experiencing hypoglycemia.  Recent symptoms of high blood sugar? none  Eye exam: due  Foot exam: due  ACEi/ARB: no due to elevated Cr with losartan  Urine Albumin:   Lab Results   Component Value Date    UMALCR 6776.56 (H) 2017      Aspirin: Taking 81mg daily and denies side effects  Diet/Exercise: eating  oatmeal for breakfast, estimates 1-2 cups cooked. Largest meal is dinner.   Lab Results   Component Value Date    A1C 7.3 02/28/2019    A1C 8.2 09/16/2018    A1C 8.8 05/26/2018    A1C 6.3 01/12/2018    A1C 6.7 12/19/2017     nightmares: currently taking prazosin 1mg HS, recently started by PCP. After the hospitalizations, she was having vivid nightmares and confusing them with reality.  One evening she thought there were people in her house and called the police; only remembers this vaguely. Took prazosin last night and slept well for the first time since hospitalization, no nightmares.  Denies side effects.       Today's Vitals: There were no vitals taken for this visit. - phone call    ASSESSMENT:                 Current medications were reviewed today.      Medication Adherence: excellent, no issues identified    Hypertension: improved. Continue to monitor.    Diabetes: needs improvement. A1c at goal <8% but BS are currently elevated. Total daily dose of insulin currently ~60u/day considering Lantus and Novolog use. Using rule of 1800, 1u of Novolog will reduce BS by ~25mg/dl.  Will revise sliding scale and encouraged pt to use base of 10u Novolog with oatmeal in the morning to cover for high carb meal.     nightmares: improved, will monitor      PLAN:                  Post Discharge Medication Reconciliation Status: discharge medications reconciled and changed, per note/orders (see AVS).    Increase Novolog to 10 units before breakfast and dinner and use the following sliding scale:  140-199 2u  200-249 4u  250-299 6u  300-349 8u  350-399 10u  Over 400 12u    I spent 30 minutes with this patient today. All changes were made via collaborative practice agreement with Marivel Barcenas. A copy of the visit note was provided to the patient's primary care provider.    Will follow up in 3 weeks.    The patient was sent via [employer] a summary of these recommendations as an after visit summary.    Conchita Toledo  PharmD, BCACP

## 2019-05-07 NOTE — PROGRESS NOTES
Clinic Care Coordination Contact  Care Team Conversations    Patient's home care nurse did not return call. Reviewed with PCP and I likely have the wrong nurse's number. PCP saw patient last week in her home. Patient doing OK. Was having nightmares and prazosin was started. MTM is going to do phone visit with patient soon. No concerns currently for patient from PCP. Follow up one month    Porsha Khalil RN  [place] [facility]-Care Coordination  [facility]  [phone]

## 2019-05-07 NOTE — TELEPHONE ENCOUNTER
"Requested Prescriptions   Pending Prescriptions Disp Refills     levETIRAcetam (KEPPRA) 750 MG tablet 60 tablet 11     Sig: Take 1 tablet (750 mg) by mouth 2 times daily       Anti-Seizure Meds Protocol  Failed - 5/7/2019 12:11 PM        Failed - Review Authorizing provider's last note.      Refer to last progress notes: confirm request is for original authorizing provider (cannot be through other providers).  Office visit 5/3/19 - Swapna  History of seizure disorder.    [PTA: Keppra 750 mg po bid]  -Continue PTA Keppra          Failed - Normal CBC on file in past 26 months     Recent Labs   Lab Test 04/26/19  0754   WBC 6.0   RBC 3.31*   HGB 9.6*   HCT 30.6*   *                 Failed - Normal serum creatinine on file in past 26 months     Recent Labs   Lab Test 04/26/19  0754   CR 1.57*             Failed - Normal platelet count on file in past 26 months     Recent Labs   Lab Test 04/26/19  0754   *               Passed - Recent (12 mo) or future (30 days) visit within the authorizing provider's specialty     Patient had office visit in the last 12 months or has a visit in the next 30 days with authorizing provider or within the authorizing provider's specialty.  See \"Patient Info\" tab in inbasket, or \"Choose Columns\" in Meds & Orders section of the refill encounter.              Passed - Normal ALT or AST on file in past 26 months     Recent Labs   Lab Test 04/24/19  1258   ALT 22     Recent Labs   Lab Test 04/24/19  1258   AST 11             Passed - Medication is active on med list        Passed - No active pregnancy on record        Passed - No positive pregnancy test in last 12 months        Prescription approved per [facility] Refill Protocol.    Signed Prescriptions:                        Disp   Refills    levETIRAcetam (KEPPRA) 750 MG tablet       60 tab*2        Sig: Take 1 tablet (750 mg) by mouth 2 times daily  Authorizing Provider: DEIRDRE FRASER  Ordering User: DAPHNEY, " ARABELLA      Closing encounter - no further actions needed at this time    Arabella Meneses RN

## 2019-05-09 ENCOUNTER — TELEPHONE (OUTPATIENT)
Dept: FAMILY MEDICINE | Facility: CLINIC | Age: 74
End: 2019-05-09

## 2019-05-09 NOTE — PATIENT INSTRUCTIONS
Recommendations from today's MTM visit:                                                        Increase Novolog to 10 units before breakfast and dinner and use the following sliding scale:  140-199 2u  200-249 4u  250-299 6u  300-349 8u  350-399 10u  Over 400 12u    Next MTM visit: 3 weeks by phone    To schedule another MTM appointment, please call the clinic directly or you may call the MTM scheduling line at [phone] or toll-free at [phone].     My Clinical Pharmacist's contact information:                                                      It was a pleasure talking with you today!  Please feel free to contact me with any questions or concerns you have.      Conhcita Toledo, PharmD, [facility]   [phone]    You may receive a survey about the MTM services you received by email and/or US Mail.  I would appreciate your feedback to help me serve you better in the future. Your comments will be anonymous.

## 2019-05-10 ENCOUNTER — MEDICAL CORRESPONDENCE (OUTPATIENT)
Dept: HEALTH INFORMATION MANAGEMENT | Facility: CLINIC | Age: 74
End: 2019-05-10

## 2019-05-10 LAB
ALBUMIN UR-MCNC: 100 MG/DL
ANION GAP SERPL CALCULATED.3IONS-SCNC: 6 MMOL/L (ref 3–14)
APPEARANCE UR: ABNORMAL
BACTERIA #/AREA URNS HPF: ABNORMAL /HPF
BASOPHILS # BLD AUTO: 0 10E9/L (ref 0–0.2)
BASOPHILS NFR BLD AUTO: 0.4 %
BILIRUB UR QL STRIP: NEGATIVE
BUN SERPL-MCNC: 53 MG/DL (ref 7–30)
CALCIUM SERPL-MCNC: 8.3 MG/DL (ref 8.5–10.1)
CHLORIDE SERPL-SCNC: 110 MMOL/L (ref 94–109)
CO2 SERPL-SCNC: 23 MMOL/L (ref 20–32)
COLOR UR AUTO: ABNORMAL
CREAT SERPL-MCNC: 1.94 MG/DL (ref 0.52–1.04)
DIFFERENTIAL METHOD BLD: ABNORMAL
EOSINOPHIL # BLD AUTO: 0.2 10E9/L (ref 0–0.7)
EOSINOPHIL NFR BLD AUTO: 2.9 %
ERYTHROCYTE [DISTWIDTH] IN BLOOD BY AUTOMATED COUNT: 14.6 % (ref 10–15)
GFR SERPL CREATININE-BSD FRML MDRD: 25 ML/MIN/{1.73_M2}
GLUCOSE SERPL-MCNC: 125 MG/DL (ref 70–99)
GLUCOSE UR STRIP-MCNC: NEGATIVE MG/DL
HCT VFR BLD AUTO: 29.9 % (ref 35–47)
HGB BLD-MCNC: 9.5 G/DL (ref 11.7–15.7)
HGB UR QL STRIP: ABNORMAL
IMM GRANULOCYTES # BLD: 0 10E9/L (ref 0–0.4)
IMM GRANULOCYTES NFR BLD: 0.5 %
KETONES UR STRIP-MCNC: NEGATIVE MG/DL
LEUKOCYTE ESTERASE UR QL STRIP: ABNORMAL
LYMPHOCYTES # BLD AUTO: 1.3 10E9/L (ref 0.8–5.3)
LYMPHOCYTES NFR BLD AUTO: 17.6 %
MCH RBC QN AUTO: 29.1 PG (ref 26.5–33)
MCHC RBC AUTO-ENTMCNC: 31.8 G/DL (ref 31.5–36.5)
MCV RBC AUTO: 92 FL (ref 78–100)
MONOCYTES # BLD AUTO: 0.5 10E9/L (ref 0–1.3)
MONOCYTES NFR BLD AUTO: 6.6 %
NEUTROPHILS # BLD AUTO: 5.5 10E9/L (ref 1.6–8.3)
NEUTROPHILS NFR BLD AUTO: 72 %
NITRATE UR QL: POSITIVE
NRBC # BLD AUTO: 0 10*3/UL
NRBC BLD AUTO-RTO: 0 /100
PH UR STRIP: 7 PH (ref 5–7)
PLATELET # BLD AUTO: 168 10E9/L (ref 150–450)
POTASSIUM SERPL-SCNC: 4.6 MMOL/L (ref 3.4–5.3)
PROCALCITONIN SERPL-MCNC: 0.09 NG/ML
RBC # BLD AUTO: 3.26 10E12/L (ref 3.8–5.2)
RBC #/AREA URNS AUTO: 40 /HPF (ref 0–2)
SODIUM SERPL-SCNC: 139 MMOL/L (ref 133–144)
SOURCE: ABNORMAL
SP GR UR STRIP: 1.02 (ref 1–1.03)
UROBILINOGEN UR STRIP-MCNC: NORMAL MG/DL (ref 0–2)
WBC # BLD AUTO: 7.6 10E9/L (ref 4–11)
WBC #/AREA URNS AUTO: >182 /HPF (ref 0–5)
YEAST #/AREA URNS HPF: ABNORMAL /HPF

## 2019-05-10 PROCEDURE — 87088 URINE BACTERIA CULTURE: CPT | Performed by: FAMILY MEDICINE

## 2019-05-10 PROCEDURE — 87086 URINE CULTURE/COLONY COUNT: CPT | Performed by: FAMILY MEDICINE

## 2019-05-10 PROCEDURE — 84145 PROCALCITONIN (PCT): CPT | Performed by: FAMILY MEDICINE

## 2019-05-10 PROCEDURE — 85025 COMPLETE CBC W/AUTO DIFF WBC: CPT | Performed by: FAMILY MEDICINE

## 2019-05-10 PROCEDURE — 80197 ASSAY OF TACROLIMUS: CPT | Performed by: FAMILY MEDICINE

## 2019-05-10 PROCEDURE — 81001 URINALYSIS AUTO W/SCOPE: CPT | Performed by: FAMILY MEDICINE

## 2019-05-10 PROCEDURE — 87186 SC STD MICRODIL/AGAR DIL: CPT | Performed by: FAMILY MEDICINE

## 2019-05-10 PROCEDURE — 80048 BASIC METABOLIC PNL TOTAL CA: CPT | Performed by: FAMILY MEDICINE

## 2019-05-11 LAB
BACTERIA SPEC CULT: ABNORMAL
SPECIMEN SOURCE: ABNORMAL
TACROLIMUS BLD-MCNC: 4 UG/L (ref 5–15)
TME LAST DOSE: ABNORMAL H

## 2019-05-13 ENCOUNTER — TELEPHONE (OUTPATIENT)
Dept: FAMILY MEDICINE | Facility: CLINIC | Age: 74
End: 2019-05-13

## 2019-05-13 NOTE — TELEPHONE ENCOUNTER
Labs reviewed, UC still growing out Citrobacter Freundii after 10 days of Cipro treatment. Will forward results to ID Dr. Olmedo for feedback, and her other labs to her Nephrologist for follow up.      Klaudia MCFARLANEP

## 2019-05-17 ENCOUNTER — PATIENT OUTREACH (OUTPATIENT)
Dept: CARE COORDINATION | Facility: CLINIC | Age: 74
End: 2019-05-17

## 2019-05-17 DIAGNOSIS — N39.0 URINARY TRACT INFECTION ASSOCIATED WITH INDWELLING URETHRAL CATHETER, SUBSEQUENT ENCOUNTER: Primary | ICD-10-CM

## 2019-05-17 DIAGNOSIS — T83.511D URINARY TRACT INFECTION ASSOCIATED WITH INDWELLING URETHRAL CATHETER, SUBSEQUENT ENCOUNTER: Primary | ICD-10-CM

## 2019-05-17 NOTE — TELEPHONE ENCOUNTER
Spoke with Gem, 2 nights ago she did experience hallucinations & confusion. Her spouse woke her hourly to push fluids & in the morning she felt much better & feels so now.I did reiterate her fluid input per PCP is 1500 ml or 1.5 quarts Q 24 hrs. Updated [facility] RN Adriana (LM on VM)  She will call clinic or [place] Nurse if after hours if she becomes symptomatic again.  Marivel Klein RN

## 2019-05-17 NOTE — TELEPHONE ENCOUNTER
Consulted with transplant ID, would not continue treatment given she is asymptomatic and colonizes  multiple organisms, currently has slightly elevated BS and slight fluid retention per [facility] nurse.  Cath changed today, Recommendation from ID is to get a urological work up unless she becomes more symptomatic and then would treat.  We will discuss with patient and her  as she is homebound and currently not able to get up in the .  Kidney function is declining, Hgb low,  Please call the patient and see how she is doing, please encourage adequate fluids and to call if sx change, any mental confusion, fever, excessive fatigue.  Please let her know the recommendations of GABE Paez and let the [facility] nurse know      Klaudia MTZ

## 2019-05-17 NOTE — PROGRESS NOTES
Clinic Care Coordination Contact  Care Team Conversations    Reviewed with PCP. She is reaching out to [facility] ID on call consultant to review urine culture results and to determine plan. Will follow up as needed, otherwise continue monthly outreach. She agrees with plan     Porsha Khalil RN  [place] [facility]-Care Coordination  [facility] Primary Care  [phone]

## 2019-05-21 ENCOUNTER — TELEPHONE (OUTPATIENT)
Dept: FAMILY MEDICINE | Facility: CLINIC | Age: 74
End: 2019-05-21

## 2019-05-21 NOTE — LETTER
[facility]  [address] [place] [address]  [address]  [facility] [address]  Phone: [phone]                May 24, 2019    Gem Jade  [address]  [facility] [address]    Dear Gem,    We care about your health and have reviewed your health plan. We have reviewed your medical conditions, medication list, and lab results and am making recommendations based on this review, to better manage your health.    You are in particular need of attention regarding:  -Colon Cancer Screening    We Are recommending that you:  Complete a Cologuard Test.  Included is information regarding Cologuard along with an order form to sign at the bottom.  Once signed please mail back to clinic in pre-paid envelope.    Please call us at [phone] (or use [facility] [employer]) to address the above recommendations.     Thank you for trusting [facility] and we appreciate the opportunity to serve you.  We look forward to supporting your healthcare needs in the future.    Healthy Regards,    Your Integrated Primary Care Team                                        Gem Jade  [address] [place] [address]              [facility]  [address] [place] [address]  [address]  [facility] [address]      -

## 2019-05-21 NOTE — TELEPHONE ENCOUNTER
Panel Management Review      Patient has the following on her problem list: None      Composite cancer screening  Chart review shows that this patient is due/due soon for the following Colonoscopy  Summary:    Patient is due/failing the following:   Colon Cancer Screening    Action needed:   Pt has phone visit coming up with PCP and is need of colon cancer screening. Cologaurd may be a good option for pt. Will contact pt and offer cologuard and if interested send out order from.     Type of outreach:    Will contact pt via phone.    Questions for provider review:    None                                                                                                                                    Lsia Madrid    Care Management Coordinator - [facility]  [facility]

## 2019-05-22 NOTE — TELEPHONE ENCOUNTER
Called Gem to discuss cologuard as an option. Pt is interested and would like to have an order form filled out and mailed to her.     Klaudia - Do you find Cologuard as an appropriate option for Gem?  If so I will fill out order from and send it out to her.    Thank you      Lisa Madrid    Care Management Coordinator - [facility]

## 2019-05-24 NOTE — TELEPHONE ENCOUNTER
Sent order form and cologuard information by mail to [employer].  Will watch for returned form.    Lisa Madrid    Care Management Coordinator - [facility] Primary Care [facility]

## 2019-05-28 ENCOUNTER — ALLIED HEALTH/NURSE VISIT (OUTPATIENT)
Dept: PHARMACY | Facility: CLINIC | Age: 74
End: 2019-05-28
Payer: COMMERCIAL

## 2019-05-28 DIAGNOSIS — E11.42 DIABETIC POLYNEUROPATHY ASSOCIATED WITH TYPE 2 DIABETES MELLITUS (H): Primary | ICD-10-CM

## 2019-05-28 DIAGNOSIS — I10 BENIGN ESSENTIAL HYPERTENSION: ICD-10-CM

## 2019-05-28 DIAGNOSIS — Z79.4 TYPE 2 DIABETES MELLITUS WITH HYPERGLYCEMIA, WITH LONG-TERM CURRENT USE OF INSULIN (H): ICD-10-CM

## 2019-05-28 DIAGNOSIS — E03.9 HYPOTHYROIDISM, UNSPECIFIED TYPE: ICD-10-CM

## 2019-05-28 DIAGNOSIS — N39.0 COMPLICATED UTI (URINARY TRACT INFECTION): ICD-10-CM

## 2019-05-28 DIAGNOSIS — F51.5 NIGHTMARES: ICD-10-CM

## 2019-05-28 DIAGNOSIS — E11.65 TYPE 2 DIABETES MELLITUS WITH HYPERGLYCEMIA, WITH LONG-TERM CURRENT USE OF INSULIN (H): ICD-10-CM

## 2019-05-28 DIAGNOSIS — D50.9 IRON DEFICIENCY ANEMIA, UNSPECIFIED IRON DEFICIENCY ANEMIA TYPE: ICD-10-CM

## 2019-05-28 PROCEDURE — 99606 MTMS BY PHARM EST 15 MIN: CPT | Performed by: PHARMACIST

## 2019-05-28 PROCEDURE — 99607 MTMS BY PHARM ADDL 15 MIN: CPT | Performed by: PHARMACIST

## 2019-05-28 NOTE — PROGRESS NOTES
SUBJECTIVE/OBJECTIVE:                Gem Jade is a 73 year old female called for a follow-up visit for Medication Therapy Management.  She was referred to me from Marivel Barcenas.     Chief Complaint: Follow up from our visit on 5/7/19.  Neuropathy pain in extremities.   Tobacco: History of tobacco dependence - quit     Alcohol: not currently using    Medication Adherence/Access:  Patient has assistance with medication administration and setting up boxes (: Keshawn)   -Call Keshawn () with all medication changes (per 5/4/19 profile note from CNP)     Hypertension: Currently taking amlodipine 10mg daily, labetalol 150mg twice daily, prazosin 1mg HS (for nightmares). No side effects reported or low BP readings.   -Blood pressures are being checked by self and HCRN  -Reports systolic 130-150's mmHg and diastolic 32-74's mmHg; reports that readings are pretty consistent and no highs  -High systolic levels may correlate with stress (watching television shows)     Diabetes:  Pt currently taking Lantus 30 units at bedtime and Novolog 10 units with meals twice daily with sliding scale. Uses sliding scale with breakfast and at bedtime. Pt is not experiencing side effects.   Sliding scale:   140-199 2u  200-249 4u  250-299 6u  300-349 8u  350-399 10u  Over 400 12u     SMBG: Self-monitored. Ranges (pt reported):   -Average: 140's mg/dL  -Highest recently recalled: 269 mg/dL  -Pt was unable to locate notebook containing recorded BG values during the call.   Recent symptoms of low blood sugar? No  Recent symptoms of high blood sugar? No  Eye exam: due  Foot exam: due  ACEi/ARB: No due to history of elevated Cr   Aspirin: Taking 81mg daily and denies side effects  Urine Albumin:         Lab Results   Component Value Date     UMALCR 6776.56 (H) 04/26/2017      Lab Results   Component Value Date    A1C 6.2 04/24/2019    A1C 7.3 02/28/2019    A1C 8.2 09/16/2018    A1C 8.8 05/26/2018    A1C 6.3 01/12/2018      -Diabetic Neuropathy: Currently taking Gabapentin 300mg 3 caps BID  -Decreased down to 2 tabs bid during recent hospitalization. Pt then  increased by 1 capsule due to pain in legs/feet and interfering with sleep, and has recently increased back up to 6 capsules daily (for a couple of doses)    -Pt was previously on amitriptyline 50mg daily and oxcarbazepine 600mg bid but discontinued due to multiple hospitalizations for encephalopathy    Estimated Creatinine Clearance: 34.3 mL/min (A) (based on SCr of 1.94 mg/dL (H)).    Nightmares: Currently taking prazosin 1mg at bedtime (started 5/3/19 by PCP).   -After recent hospitalizations, stated having increase in vivid nightmares and confusing them with reality.   -Pt reports increase in sleep and feeling more rested.   -Pt slept for 24 hours this last Saturday but states that she is returning to normal sleeping schedule      Urinary Tract Infection Hx: No current medications  -Previously on ciprofloxacin 500mg bid for 6 days (4/27/19) with no change in symptoms.  With rest and increased fluids, symptoms have resolved and no current concerns.    Anemia (Vitamin B12): Current medication include cyanobalamin 1000mcg/mL injection q30 days. Pt is not experiencing side effects.   -Vitamin B12 levels have been high since 2/2017     -2/17/17:  1,031pg/mL     -3/9/17:    1,402pg/mL      -5/29/18:  1,383pg/mL   Hemoglobin   Date Value Ref Range Status   05/10/2019 9.5 (L) 11.7 - 15.7 g/dL Final   ]    Thyroid: Current medication includes: levothyroxine 50mcg daily. Pt is not experiencing side effects    TSH   Date Value Ref Range Status   05/29/2018 0.85 0.40 - 4.00 mU/L Final     Today's Vitals: There were no vitals taken for this visit. - phone call      ASSESSMENT:              Current medications were reviewed today as discussed above.      Medication Adherence: excellent, no issues identified    -Hypertension:  Improved. Home BP generally at goal <140/90. Continue to  monitor at home    -Diabetes:  Improved. A1c at goal of <8% however SMBG may still be elevated. Continue to monitor and encourage pt to keep completing SMBG.     -Diabetic Neuropathy: Needs improvement. Due to patient's current kidney function, per Micromedex CrCl 30-59ml/min the recommended total daily dose is 400-1400mg, eqaully divided doses (2 doses/day). If symptoms are not controlled with this dose, alternatives include discontinuing gabapentin and initiating amitriptyline or Lyrica. Recommend reducing dose of gabapentin to no more than 5 capsules of 300mg per day.    -Nightmares: Improved, will monitor    -Urinary Tract Infection Hx: Stable, will monitor    -Anemia (Vitamin B12): needs improvement, likely decline associated with decline in her renal function.  Recommend repeat B12 level with next labs and consideration to change frequency of injection to every other month if it remains elevated.    -Thyroid: Stable. Pt is due for repeat TSH, will future order to complete with next labs.    PLAN:                  -Decrease Gabapentin 300mg; max 5 capsules daily   -Labs: TSH, B12 - future order placed, complete with next blood draw    I spent 40 minutes with this patient today. All changes were made via collaborative practice agreement with Marivel Barcenas. A copy of the visit note was provided to the patient's primary care provider.     Will follow up in 4 weeks (6/27/19).    The patient was sent via new test company a summary of these recommendations as an after visit summary.    Tari Castro, Pharmacy Student   Conchita Toledo, PharmD, BCACP

## 2019-05-29 NOTE — PATIENT INSTRUCTIONS
Recommendations from today's MTM visit:                                                        1. Take no more than 5 capsules of gabapentin in a day - that is the maximum with your kidney function.     2. I entered some labs to have checked next time your blood is drawn - thyroid and B12 levels    Next MTM visit: Will call again in 1 month    To schedule another MTM appointment, please call the clinic directly or you may call the MTM scheduling line at [phone] or toll-free at [phone].     My Clinical Pharmacist's contact information:                                                      It was a pleasure talking with you today!  Please feel free to contact me with any questions or concerns you have.      Conchita Toledo, PharmD, BCACP     You may receive a survey about the MTM services you received by email and/or US Mail.  I would appreciate your feedback to help me serve you better in the future. Your comments will be anonymous.

## 2019-06-02 ENCOUNTER — APPOINTMENT (OUTPATIENT)
Dept: CT IMAGING | Facility: CLINIC | Age: 74
DRG: 699 | End: 2019-06-02
Attending: EMERGENCY MEDICINE
Payer: MEDICARE

## 2019-06-02 ENCOUNTER — HOSPITAL ENCOUNTER (INPATIENT)
Facility: CLINIC | Age: 74
LOS: 4 days | Discharge: HOME HEALTH | DRG: 699 | End: 2019-06-07
Attending: EMERGENCY MEDICINE | Admitting: INTERNAL MEDICINE
Payer: MEDICARE

## 2019-06-02 DIAGNOSIS — I10 BENIGN ESSENTIAL HYPERTENSION: Primary | ICD-10-CM

## 2019-06-02 DIAGNOSIS — T83.511D URINARY TRACT INFECTION ASSOCIATED WITH INDWELLING URETHRAL CATHETER, SUBSEQUENT ENCOUNTER: ICD-10-CM

## 2019-06-02 DIAGNOSIS — Z97.8 CHRONIC INDWELLING FOLEY CATHETER: ICD-10-CM

## 2019-06-02 DIAGNOSIS — T83.511A URINARY TRACT INFECTION ASSOCIATED WITH INDWELLING URETHRAL CATHETER, INITIAL ENCOUNTER (H): ICD-10-CM

## 2019-06-02 DIAGNOSIS — N39.0 URINARY TRACT INFECTION ASSOCIATED WITH INDWELLING URETHRAL CATHETER, INITIAL ENCOUNTER (H): ICD-10-CM

## 2019-06-02 DIAGNOSIS — N39.0 URINARY TRACT INFECTION ASSOCIATED WITH INDWELLING URETHRAL CATHETER, SUBSEQUENT ENCOUNTER: ICD-10-CM

## 2019-06-02 DIAGNOSIS — R41.82 ALTERED MENTAL STATUS, UNSPECIFIED ALTERED MENTAL STATUS TYPE: ICD-10-CM

## 2019-06-02 LAB
ALBUMIN SERPL-MCNC: 3.2 G/DL (ref 3.4–5)
ALBUMIN UR-MCNC: 100 MG/DL
ALBUMIN UR-MCNC: 100 MG/DL
ALP SERPL-CCNC: 117 U/L (ref 40–150)
ALT SERPL W P-5'-P-CCNC: 28 U/L (ref 0–50)
ANION GAP SERPL CALCULATED.3IONS-SCNC: 6 MMOL/L (ref 3–14)
APPEARANCE UR: ABNORMAL
APPEARANCE UR: ABNORMAL
AST SERPL W P-5'-P-CCNC: 17 U/L (ref 0–45)
BACTERIA #/AREA URNS HPF: ABNORMAL /HPF
BACTERIA #/AREA URNS HPF: ABNORMAL /HPF
BASOPHILS # BLD AUTO: 0 10E9/L (ref 0–0.2)
BASOPHILS NFR BLD AUTO: 0.1 %
BILIRUB SERPL-MCNC: 0.3 MG/DL (ref 0.2–1.3)
BILIRUB UR QL STRIP: NEGATIVE
BILIRUB UR QL STRIP: NEGATIVE
BUN SERPL-MCNC: 45 MG/DL (ref 7–30)
CALCIUM SERPL-MCNC: 8.9 MG/DL (ref 8.5–10.1)
CHLORIDE SERPL-SCNC: 109 MMOL/L (ref 94–109)
CO2 BLDCOV-SCNC: 28 MMOL/L (ref 21–28)
CO2 SERPL-SCNC: 27 MMOL/L (ref 20–32)
COLOR UR AUTO: ABNORMAL
COLOR UR AUTO: YELLOW
CREAT SERPL-MCNC: 1.84 MG/DL (ref 0.52–1.04)
DIFFERENTIAL METHOD BLD: ABNORMAL
EOSINOPHIL # BLD AUTO: 0.1 10E9/L (ref 0–0.7)
EOSINOPHIL NFR BLD AUTO: 1.7 %
ERYTHROCYTE [DISTWIDTH] IN BLOOD BY AUTOMATED COUNT: 14.4 % (ref 10–15)
GFR SERPL CREATININE-BSD FRML MDRD: 27 ML/MIN/{1.73_M2}
GLUCOSE SERPL-MCNC: 112 MG/DL (ref 70–99)
GLUCOSE UR STRIP-MCNC: NEGATIVE MG/DL
GLUCOSE UR STRIP-MCNC: NEGATIVE MG/DL
HCT VFR BLD AUTO: 34.2 % (ref 35–47)
HGB BLD-MCNC: 10.8 G/DL (ref 11.7–15.7)
HGB UR QL STRIP: ABNORMAL
HGB UR QL STRIP: ABNORMAL
IMM GRANULOCYTES # BLD: 0 10E9/L (ref 0–0.4)
IMM GRANULOCYTES NFR BLD: 0.4 %
INTERPRETATION ECG - MUSE: NORMAL
KETONES UR STRIP-MCNC: NEGATIVE MG/DL
KETONES UR STRIP-MCNC: NEGATIVE MG/DL
LACTATE BLD-SCNC: 0.8 MMOL/L (ref 0.7–2.1)
LEUKOCYTE ESTERASE UR QL STRIP: ABNORMAL
LEUKOCYTE ESTERASE UR QL STRIP: ABNORMAL
LYMPHOCYTES # BLD AUTO: 1.2 10E9/L (ref 0.8–5.3)
LYMPHOCYTES NFR BLD AUTO: 16.6 %
MCH RBC QN AUTO: 29.3 PG (ref 26.5–33)
MCHC RBC AUTO-ENTMCNC: 31.6 G/DL (ref 31.5–36.5)
MCV RBC AUTO: 93 FL (ref 78–100)
MONOCYTES # BLD AUTO: 0.2 10E9/L (ref 0–1.3)
MONOCYTES NFR BLD AUTO: 3.4 %
MUCOUS THREADS #/AREA URNS LPF: PRESENT /LPF
MUCOUS THREADS #/AREA URNS LPF: PRESENT /LPF
NEUTROPHILS # BLD AUTO: 5.4 10E9/L (ref 1.6–8.3)
NEUTROPHILS NFR BLD AUTO: 77.8 %
NITRATE UR QL: POSITIVE
NITRATE UR QL: POSITIVE
NRBC # BLD AUTO: 0 10*3/UL
NRBC BLD AUTO-RTO: 0 /100
PCO2 BLDV: 55 MM HG (ref 40–50)
PH BLDV: 7.32 PH (ref 7.32–7.43)
PH UR STRIP: 5.5 PH (ref 5–7)
PH UR STRIP: 6 PH (ref 5–7)
PLATELET # BLD AUTO: 177 10E9/L (ref 150–450)
PO2 BLDV: 57 MM HG (ref 25–47)
POTASSIUM SERPL-SCNC: 4.9 MMOL/L (ref 3.4–5.3)
PROT SERPL-MCNC: 8.2 G/DL (ref 6.8–8.8)
RBC # BLD AUTO: 3.68 10E12/L (ref 3.8–5.2)
RBC #/AREA URNS AUTO: 0 /HPF (ref 0–2)
RBC #/AREA URNS AUTO: 117 /HPF (ref 0–2)
SAO2 % BLDV FROM PO2: 86 %
SODIUM SERPL-SCNC: 142 MMOL/L (ref 133–144)
SOURCE: ABNORMAL
SOURCE: ABNORMAL
SP GR UR STRIP: 1.01 (ref 1–1.03)
SP GR UR STRIP: 1.01 (ref 1–1.03)
TROPONIN I SERPL-MCNC: <0.015 UG/L (ref 0–0.04)
UROBILINOGEN UR STRIP-MCNC: NORMAL MG/DL (ref 0–2)
UROBILINOGEN UR STRIP-MCNC: NORMAL MG/DL (ref 0–2)
WBC # BLD AUTO: 7 10E9/L (ref 4–11)
WBC #/AREA URNS AUTO: 94 /HPF (ref 0–5)
WBC #/AREA URNS AUTO: >182 /HPF (ref 0–5)
WBC CLUMPS #/AREA URNS HPF: PRESENT /HPF
WBC CLUMPS #/AREA URNS HPF: PRESENT /HPF
YEAST #/AREA URNS HPF: ABNORMAL /HPF
YEAST #/AREA URNS HPF: ABNORMAL /HPF

## 2019-06-02 PROCEDURE — 87186 SC STD MICRODIL/AGAR DIL: CPT | Performed by: EMERGENCY MEDICINE

## 2019-06-02 PROCEDURE — 87040 BLOOD CULTURE FOR BACTERIA: CPT | Performed by: EMERGENCY MEDICINE

## 2019-06-02 PROCEDURE — 87077 CULTURE AEROBIC IDENTIFY: CPT | Performed by: EMERGENCY MEDICINE

## 2019-06-02 PROCEDURE — 93005 ELECTROCARDIOGRAM TRACING: CPT

## 2019-06-02 PROCEDURE — 83605 ASSAY OF LACTIC ACID: CPT

## 2019-06-02 PROCEDURE — 87088 URINE BACTERIA CULTURE: CPT | Performed by: EMERGENCY MEDICINE

## 2019-06-02 PROCEDURE — 84145 PROCALCITONIN (PCT): CPT | Performed by: EMERGENCY MEDICINE

## 2019-06-02 PROCEDURE — 70450 CT HEAD/BRAIN W/O DYE: CPT

## 2019-06-02 PROCEDURE — 85025 COMPLETE CBC W/AUTO DIFF WBC: CPT | Performed by: EMERGENCY MEDICINE

## 2019-06-02 PROCEDURE — 81001 URINALYSIS AUTO W/SCOPE: CPT | Performed by: EMERGENCY MEDICINE

## 2019-06-02 PROCEDURE — 80053 COMPREHEN METABOLIC PANEL: CPT | Performed by: EMERGENCY MEDICINE

## 2019-06-02 PROCEDURE — 84484 ASSAY OF TROPONIN QUANT: CPT | Performed by: EMERGENCY MEDICINE

## 2019-06-02 PROCEDURE — 96365 THER/PROPH/DIAG IV INF INIT: CPT

## 2019-06-02 PROCEDURE — 25000128 H RX IP 250 OP 636: Performed by: EMERGENCY MEDICINE

## 2019-06-02 PROCEDURE — 74176 CT ABD & PELVIS W/O CONTRAST: CPT

## 2019-06-02 PROCEDURE — 99285 EMERGENCY DEPT VISIT HI MDM: CPT | Mod: 25

## 2019-06-02 PROCEDURE — 87086 URINE CULTURE/COLONY COUNT: CPT | Performed by: EMERGENCY MEDICINE

## 2019-06-02 PROCEDURE — 87800 DETECT AGNT MULT DNA DIREC: CPT | Performed by: EMERGENCY MEDICINE

## 2019-06-02 PROCEDURE — 36415 COLL VENOUS BLD VENIPUNCTURE: CPT

## 2019-06-02 PROCEDURE — 82803 BLOOD GASES ANY COMBINATION: CPT

## 2019-06-02 RX ORDER — ERTAPENEM 1 G/1
1 INJECTION, POWDER, LYOPHILIZED, FOR SOLUTION INTRAMUSCULAR; INTRAVENOUS ONCE
Status: COMPLETED | OUTPATIENT
Start: 2019-06-02 | End: 2019-06-03

## 2019-06-02 RX ADMIN — ERTAPENEM SODIUM 1 G: 1 INJECTION, POWDER, LYOPHILIZED, FOR SOLUTION INTRAMUSCULAR; INTRAVENOUS at 23:12

## 2019-06-03 ENCOUNTER — APPOINTMENT (OUTPATIENT)
Dept: SPEECH THERAPY | Facility: CLINIC | Age: 74
DRG: 699 | End: 2019-06-03
Attending: INTERNAL MEDICINE
Payer: MEDICARE

## 2019-06-03 PROBLEM — G93.40 ENCEPHALOPATHY ACUTE: Status: ACTIVE | Noted: 2018-09-16

## 2019-06-03 LAB
GLUCOSE BLDC GLUCOMTR-MCNC: 108 MG/DL (ref 70–99)
GLUCOSE BLDC GLUCOMTR-MCNC: 79 MG/DL (ref 70–99)
GLUCOSE BLDC GLUCOMTR-MCNC: 93 MG/DL (ref 70–99)
GLUCOSE BLDC GLUCOMTR-MCNC: 95 MG/DL (ref 70–99)
GLUCOSE BLDC GLUCOMTR-MCNC: 99 MG/DL (ref 70–99)
PLATELET # BLD AUTO: 159 10E9/L (ref 150–450)
PROCALCITONIN SERPL-MCNC: <0.05 NG/ML
TACROLIMUS BLD-MCNC: 4.7 UG/L (ref 5–15)
TME LAST DOSE: ABNORMAL H
TSH SERPL DL<=0.005 MIU/L-ACNC: 1.28 MU/L (ref 0.4–4)

## 2019-06-03 PROCEDURE — 25800030 ZZH RX IP 258 OP 636: Performed by: INTERNAL MEDICINE

## 2019-06-03 PROCEDURE — 25000132 ZZH RX MED GY IP 250 OP 250 PS 637: Performed by: INTERNAL MEDICINE

## 2019-06-03 PROCEDURE — 40000275 ZZH STATISTIC RCP TIME EA 10 MIN

## 2019-06-03 PROCEDURE — 25000131 ZZH RX MED GY IP 250 OP 636 PS 637: Performed by: INTERNAL MEDICINE

## 2019-06-03 PROCEDURE — 00000146 ZZHCL STATISTIC GLUCOSE BY METER IP

## 2019-06-03 PROCEDURE — 99223 1ST HOSP IP/OBS HIGH 75: CPT | Mod: AI | Performed by: INTERNAL MEDICINE

## 2019-06-03 PROCEDURE — 80177 DRUG SCRN QUAN LEVETIRACETAM: CPT | Performed by: INTERNAL MEDICINE

## 2019-06-03 PROCEDURE — 84443 ASSAY THYROID STIM HORMONE: CPT | Performed by: INTERNAL MEDICINE

## 2019-06-03 PROCEDURE — 80197 ASSAY OF TACROLIMUS: CPT | Performed by: INTERNAL MEDICINE

## 2019-06-03 PROCEDURE — A9270 NON-COVERED ITEM OR SERVICE: HCPCS | Performed by: INTERNAL MEDICINE

## 2019-06-03 PROCEDURE — 25000128 H RX IP 250 OP 636: Performed by: INTERNAL MEDICINE

## 2019-06-03 PROCEDURE — 80171 DRUG SCREEN QUANT GABAPENTIN: CPT | Performed by: INTERNAL MEDICINE

## 2019-06-03 PROCEDURE — 99207 ZZC APP CREDIT; MD BILLING SHARED VISIT: CPT | Performed by: INTERNAL MEDICINE

## 2019-06-03 PROCEDURE — 85049 AUTOMATED PLATELET COUNT: CPT | Performed by: INTERNAL MEDICINE

## 2019-06-03 PROCEDURE — 36415 COLL VENOUS BLD VENIPUNCTURE: CPT | Performed by: INTERNAL MEDICINE

## 2019-06-03 PROCEDURE — 94660 CPAP INITIATION&MGMT: CPT

## 2019-06-03 PROCEDURE — 92610 EVALUATE SWALLOWING FUNCTION: CPT | Mod: GN

## 2019-06-03 PROCEDURE — 12000000 ZZH R&B MED SURG/OB

## 2019-06-03 RX ORDER — DEXTROSE MONOHYDRATE 25 G/50ML
25-50 INJECTION, SOLUTION INTRAVENOUS
Status: DISCONTINUED | OUTPATIENT
Start: 2019-06-03 | End: 2019-06-03

## 2019-06-03 RX ORDER — POLYETHYLENE GLYCOL 3350 17 G/17G
17 POWDER, FOR SOLUTION ORAL DAILY PRN
Status: DISCONTINUED | OUTPATIENT
Start: 2019-06-03 | End: 2019-06-07 | Stop reason: HOSPADM

## 2019-06-03 RX ORDER — HEPARIN SODIUM 5000 [USP'U]/.5ML
5000 INJECTION, SOLUTION INTRAVENOUS; SUBCUTANEOUS EVERY 12 HOURS
Status: DISCONTINUED | OUTPATIENT
Start: 2019-06-03 | End: 2019-06-07 | Stop reason: HOSPADM

## 2019-06-03 RX ORDER — ACETAMINOPHEN 325 MG/1
650 TABLET ORAL EVERY 4 HOURS PRN
Status: DISCONTINUED | OUTPATIENT
Start: 2019-06-03 | End: 2019-06-07 | Stop reason: HOSPADM

## 2019-06-03 RX ORDER — ONDANSETRON 4 MG/1
4 TABLET, ORALLY DISINTEGRATING ORAL EVERY 6 HOURS PRN
Status: DISCONTINUED | OUTPATIENT
Start: 2019-06-03 | End: 2019-06-07 | Stop reason: HOSPADM

## 2019-06-03 RX ORDER — ERTAPENEM 1 G/1
1 INJECTION, POWDER, LYOPHILIZED, FOR SOLUTION INTRAMUSCULAR; INTRAVENOUS EVERY 24 HOURS
Status: DISCONTINUED | OUTPATIENT
Start: 2019-06-03 | End: 2019-06-07 | Stop reason: HOSPADM

## 2019-06-03 RX ORDER — PROCHLORPERAZINE 25 MG
12.5 SUPPOSITORY, RECTAL RECTAL EVERY 12 HOURS PRN
Status: DISCONTINUED | OUTPATIENT
Start: 2019-06-03 | End: 2019-06-07 | Stop reason: HOSPADM

## 2019-06-03 RX ORDER — ASPIRIN 81 MG/1
81 TABLET ORAL DAILY
Status: DISCONTINUED | OUTPATIENT
Start: 2019-06-03 | End: 2019-06-07 | Stop reason: HOSPADM

## 2019-06-03 RX ORDER — NICOTINE POLACRILEX 4 MG
15-30 LOZENGE BUCCAL
Status: DISCONTINUED | OUTPATIENT
Start: 2019-06-03 | End: 2019-06-07 | Stop reason: HOSPADM

## 2019-06-03 RX ORDER — NALOXONE HYDROCHLORIDE 0.4 MG/ML
.1-.4 INJECTION, SOLUTION INTRAMUSCULAR; INTRAVENOUS; SUBCUTANEOUS
Status: DISCONTINUED | OUTPATIENT
Start: 2019-06-03 | End: 2019-06-07 | Stop reason: HOSPADM

## 2019-06-03 RX ORDER — NICOTINE POLACRILEX 4 MG
15-30 LOZENGE BUCCAL
Status: DISCONTINUED | OUTPATIENT
Start: 2019-06-03 | End: 2019-06-03

## 2019-06-03 RX ORDER — PRAZOSIN HYDROCHLORIDE 1 MG/1
1 CAPSULE ORAL AT BEDTIME
Status: DISCONTINUED | OUTPATIENT
Start: 2019-06-03 | End: 2019-06-07 | Stop reason: HOSPADM

## 2019-06-03 RX ORDER — AMLODIPINE BESYLATE 10 MG/1
10 TABLET ORAL DAILY
Status: DISCONTINUED | OUTPATIENT
Start: 2019-06-03 | End: 2019-06-07 | Stop reason: HOSPADM

## 2019-06-03 RX ORDER — LABETALOL 20 MG/4 ML (5 MG/ML) INTRAVENOUS SYRINGE
10
Status: DISCONTINUED | OUTPATIENT
Start: 2019-06-03 | End: 2019-06-07 | Stop reason: HOSPADM

## 2019-06-03 RX ORDER — DEXTROSE MONOHYDRATE 25 G/50ML
25-50 INJECTION, SOLUTION INTRAVENOUS
Status: DISCONTINUED | OUTPATIENT
Start: 2019-06-03 | End: 2019-06-07 | Stop reason: HOSPADM

## 2019-06-03 RX ORDER — FAMOTIDINE 20 MG/1
20 TABLET, FILM COATED ORAL 2 TIMES DAILY
Status: DISCONTINUED | OUTPATIENT
Start: 2019-06-03 | End: 2019-06-05

## 2019-06-03 RX ORDER — PREDNISONE 5 MG/1
5 TABLET ORAL DAILY
Status: DISCONTINUED | OUTPATIENT
Start: 2019-06-03 | End: 2019-06-07 | Stop reason: HOSPADM

## 2019-06-03 RX ORDER — ONDANSETRON 2 MG/ML
4 INJECTION INTRAMUSCULAR; INTRAVENOUS EVERY 6 HOURS PRN
Status: DISCONTINUED | OUTPATIENT
Start: 2019-06-03 | End: 2019-06-07 | Stop reason: HOSPADM

## 2019-06-03 RX ORDER — PROCHLORPERAZINE MALEATE 5 MG
5 TABLET ORAL EVERY 6 HOURS PRN
Status: DISCONTINUED | OUTPATIENT
Start: 2019-06-03 | End: 2019-06-07 | Stop reason: HOSPADM

## 2019-06-03 RX ORDER — LEVOTHYROXINE SODIUM 50 UG/1
50 TABLET ORAL DAILY
Status: DISCONTINUED | OUTPATIENT
Start: 2019-06-03 | End: 2019-06-07 | Stop reason: HOSPADM

## 2019-06-03 RX ORDER — AMOXICILLIN 250 MG
1 CAPSULE ORAL 2 TIMES DAILY PRN
Status: DISCONTINUED | OUTPATIENT
Start: 2019-06-03 | End: 2019-06-07 | Stop reason: HOSPADM

## 2019-06-03 RX ORDER — AMOXICILLIN 250 MG
2 CAPSULE ORAL 2 TIMES DAILY PRN
Status: DISCONTINUED | OUTPATIENT
Start: 2019-06-03 | End: 2019-06-07 | Stop reason: HOSPADM

## 2019-06-03 RX ORDER — HYDRALAZINE HYDROCHLORIDE 20 MG/ML
10 INJECTION INTRAMUSCULAR; INTRAVENOUS EVERY 4 HOURS PRN
Status: DISCONTINUED | OUTPATIENT
Start: 2019-06-03 | End: 2019-06-07 | Stop reason: HOSPADM

## 2019-06-03 RX ORDER — LIDOCAINE 40 MG/G
CREAM TOPICAL
Status: DISCONTINUED | OUTPATIENT
Start: 2019-06-03 | End: 2019-06-07 | Stop reason: HOSPADM

## 2019-06-03 RX ORDER — PRAVASTATIN SODIUM 10 MG
10 TABLET ORAL DAILY
Status: DISCONTINUED | OUTPATIENT
Start: 2019-06-03 | End: 2019-06-07 | Stop reason: HOSPADM

## 2019-06-03 RX ORDER — ACETAMINOPHEN 650 MG/1
650 SUPPOSITORY RECTAL EVERY 4 HOURS PRN
Status: DISCONTINUED | OUTPATIENT
Start: 2019-06-03 | End: 2019-06-07 | Stop reason: HOSPADM

## 2019-06-03 RX ORDER — SODIUM CHLORIDE 9 MG/ML
INJECTION, SOLUTION INTRAVENOUS CONTINUOUS
Status: DISCONTINUED | OUTPATIENT
Start: 2019-06-03 | End: 2019-06-06

## 2019-06-03 RX ORDER — BISACODYL 10 MG
10 SUPPOSITORY, RECTAL RECTAL DAILY PRN
Status: DISCONTINUED | OUTPATIENT
Start: 2019-06-03 | End: 2019-06-07 | Stop reason: HOSPADM

## 2019-06-03 RX ORDER — TACROLIMUS 1 MG/1
2 CAPSULE ORAL 2 TIMES DAILY
Status: DISCONTINUED | OUTPATIENT
Start: 2019-06-03 | End: 2019-06-07 | Stop reason: HOSPADM

## 2019-06-03 RX ADMIN — PHENYTOIN 1 MG: 125 SUSPENSION ORAL at 21:38

## 2019-06-03 RX ADMIN — LEVOTHYROXINE SODIUM 50 MCG: 50 TABLET ORAL at 15:46

## 2019-06-03 RX ADMIN — PREDNISONE 5 MG: 5 TABLET ORAL at 11:49

## 2019-06-03 RX ADMIN — LEVETIRACETAM 750 MG: 500 TABLET, FILM COATED ORAL at 20:03

## 2019-06-03 RX ADMIN — PHENYTOIN 1 MG: 125 SUSPENSION ORAL at 03:39

## 2019-06-03 RX ADMIN — SODIUM CHLORIDE 2000 ML: 9 INJECTION, SOLUTION INTRAVENOUS at 01:20

## 2019-06-03 RX ADMIN — SODIUM CHLORIDE: 9 INJECTION, SOLUTION INTRAVENOUS at 16:55

## 2019-06-03 RX ADMIN — FAMOTIDINE 20 MG: 20 TABLET ORAL at 11:49

## 2019-06-03 RX ADMIN — ERTAPENEM SODIUM 1 G: 1 INJECTION, POWDER, LYOPHILIZED, FOR SOLUTION INTRAMUSCULAR; INTRAVENOUS at 21:43

## 2019-06-03 RX ADMIN — FAMOTIDINE 20 MG: 20 TABLET ORAL at 20:03

## 2019-06-03 RX ADMIN — AMLODIPINE BESYLATE 10 MG: 10 TABLET ORAL at 12:05

## 2019-06-03 RX ADMIN — HEPARIN SODIUM 5000 UNITS: 5000 INJECTION, SOLUTION INTRAVENOUS; SUBCUTANEOUS at 03:34

## 2019-06-03 RX ADMIN — SODIUM CHLORIDE: 9 INJECTION, SOLUTION INTRAVENOUS at 06:42

## 2019-06-03 RX ADMIN — Medication 150 MG: at 20:02

## 2019-06-03 RX ADMIN — PRAVASTATIN SODIUM 10 MG: 10 TABLET ORAL at 20:03

## 2019-06-03 RX ADMIN — HEPARIN SODIUM 5000 UNITS: 5000 INJECTION, SOLUTION INTRAVENOUS; SUBCUTANEOUS at 15:46

## 2019-06-03 RX ADMIN — TACROLIMUS 2 MG: 1 CAPSULE ORAL at 20:03

## 2019-06-03 RX ADMIN — ASPIRIN 81 MG: 81 TABLET, COATED ORAL at 11:49

## 2019-06-03 ASSESSMENT — ACTIVITIES OF DAILY LIVING (ADL)
ADLS_ACUITY_SCORE: 31
ADLS_ACUITY_SCORE: 31
ADLS_ACUITY_SCORE: 32
ADLS_ACUITY_SCORE: 34
ADLS_ACUITY_SCORE: 32

## 2019-06-03 ASSESSMENT — ENCOUNTER SYMPTOMS: ABDOMINAL PAIN: 1

## 2019-06-03 NOTE — ED NOTES
"[facility]  ED Nurse Handoff Report    ED Chief complaint: Altered Mental Status (Per EMS report, altered mental status since this AM. Not answering questions appropriately. Hx of renal transplant. Indwelling cath. Hx of urosepsis. Always on CPAP at home. Paraplegic. Baseline mostly A&O at home, per . Pt's  is Keshawn. Number [phone].)      ED Diagnosis:   Final diagnoses:   None       Code Status: Full Code    Allergies:   Allergies   Allergen Reactions     Atorvastatin Calcium      myalgias, muscle aches     Codeine Nausea and Vomiting     Darvocet [Propoxyphene N-Apap] Nausea and Vomiting     Hydromorphone      Levofloxacin Other (See Comments) and Diarrhea     hallucinations     Morphine      Nausea and vomiting     Niaspan [Niacin] GI Disturbance     Flushing even at low dose, GI upset     Percocet [Oxycodone-Acetaminophen]      Vomiting after taking med couple of times     Vicodin [Hydrocodone-Acetaminophen] Nausea and Vomiting       Activity level - Baseline/Home:  Total care. Bed bound for 8 years, now due to poor muscle tone is not able to move legs (per ).    Activity Level - Current:   Total Care     Needed?: No    Isolation: No  Infection: Not Applicable  Bariatric?: Yes    Vital Signs:   Vitals:    06/02/19 2045 06/02/19 2130 06/02/19 2200 06/02/19 2230   BP: 172/80 144/71 152/70 151/77   Pulse:  59 58 62   Resp:       Temp:       TempSrc:       SpO2: 96% 100% 98% 98%       Cardiac Rhythm: ,        Pain level:      Is this patient confused?: Yes   Does this patient have a guardian?  No         If yes, is there guardianship documents in the Epic \"Code/ACP\" activity?  N/A         Guardian Notified?  N/A  [place] - Suicide Severity Rating Scale Completed?  Yes  If yes, what color did the patient score?  White    Patient Report: 73 year old female. Lives at home with  and has home health often. Pt typically A&O, but doesn't move either lower " extremity. She has an indwelling cath at all times due to being bed bound. Today pt had repetative questioning, was confused, and then became essentially nonverbal. This has happened when pt has been uroseptic before, so  sent her here. Labs reassuring, not indicating sepsis, but pt does have a complex UTI. Given hx of MRSA, VRE & ESBL, ID was contacted. They recommended a urine gram stain and Invanz. Awaiting gram stain results. Of note, the pt is typically on CPAP at night. She has significant abdominal pain. So, a CT was ordered. Awaiting results.    Family Comments: , Chang, very pleasant and helpful. He can be reached at [phone].    OBS brochure/video discussed/provided to patient/family: N/A    ED Medications:   Medications   ertapenem (INVanz) 1 g vial to attach to  mL bag (0 g Intravenous Hold 6/2/19 0324)       Drips infusing?:  Yes    For the majority of the shift this patient was Green.   Interventions performed were N/A.    Severe Sepsis OR Septic Shock Diagnosis Present: No    To be done/followed up on inpatient unit:  Continue cares. Await gram stain results. Awaiting CT results.    ED NURSE PHONE NUMBER: [phone].

## 2019-06-03 NOTE — ED NOTES
Pt's rico catheter removed and new (16F) on placed under sterile technique. Rico catheter balloon inflated with 20mL NS vs 10mL due to dilated urethra. (Prior rico catheter had 30mL placed, but was an 18F with a 30mL balloon). New urine specimen obtained & sent to the lab per MD request.

## 2019-06-03 NOTE — PHARMACY-ADMISSION MEDICATION HISTORY
Admission medication history interview status for the 6/2/2019  admission is complete. See EPIC admission navigator for prior to admission medications     Medication history source reliability:Good    Actions taken by pharmacist (provider contacted, etc):left message for pt's  to bring list, but he stated he was not in charge of this. Found MTM pharmacist note from a few days ago in Epic. Used this list.     Additional medication history information not noted on PTA med list :None    Medication reconciliation/reorder completed by provider prior to medication history? Yes    Time spent in this activity: 20 minutes    Prior to Admission medications    Medication Sig Last Dose Taking? Auth Provider   ACETAMINOPHEN PO Take 325-650 mg by mouth every 4 hours as needed.   Unknown, Entered By History   amLODIPine (NORVASC) 5 MG tablet Take 2 tablets (10 mg) by mouth daily   Marivel Barcenas APRN CNP   aspirin 81 MG tablet Take 81 mg by mouth daily   Unknown, Entered By History   baclofen (LIORESAL) 10 MG tablet Take 1 tablet (10 mg) by mouth 2 times daily as needed for other (Bladder Spasms)   Marivel Barcenas APRN CNP   blood glucose (ACCU-CHEK COMPACT PLUS) test strip USE TO TEST BLOOD SUGAR THREE TIMES A DAY   Marivel Barcenas APRN CNP   COMFORT EZ PEN NEEDLES 31G X 6 MM miscellaneous USE 4 DAILY OR AS DIRECTED   Marivel Barcenas APRN CNP   Cranberry 400 MG TABS Take 400 mg by mouth 2 times daily   Unknown, Entered By History   cyanocobalamin (VITAMIN B12) 1000 MCG/ML injection Inject 1 mL (1,000 mcg) into the muscle every 30 days   Marivel Barcenas APRN CNP   diphenoxylate-atropine (LOMOTIL) 2.5-0.025 MG tablet Take 1-2 tablets by mouth 4 times daily as needed for diarrhea   Amy Orozco MD   famotidine (PEPCID) 20 MG tablet Take 1 tablet (20 mg) by mouth 2 times daily   Marivel Barcenas APRN CNP   gabapentin (NEURONTIN) 300 MG capsule Take 2  "capsules (600 mg) by mouth 2 times daily May take an additional 300mg at bedtime daily with increase pain   Marivel Barcenas APRN CNP   insulin aspart (NOVOLOG FLEXPEN) 100 UNIT/ML pen Take 10 units with meals twice daily with sliding scale. Max 40 units per day  Sliding scale:   140-199 2u  200-249 4u  250-299 6u  300-349 8u  350-399 10u  Over 400 12u      insulin glargine (LANTUS SOLOSTAR PEN) 100 UNIT/ML pen Inject 30 Units Subcutaneous At Bedtime      labetalol (NORMODYNE) 300 MG tablet Take 0.5 tablets (150 mg) by mouth 2 times daily   Marivel Barcenas APRN CNP   Lactobacillus Rhamnosus, GG, (CULTURELL) capsule Take 1 capsule by mouth 2 times daily   She Siddiqui MD   levETIRAcetam (KEPPRA) 750 MG tablet Take 1 tablet (750 mg) by mouth 2 times daily   Marivel Barcenas APRN CNP   levothyroxine (SYNTHROID/LEVOTHROID) 50 MCG tablet TAKE ONE TABLET BY MOUTH EVERY DAY   Marivel Barcenas APRN CNP   lidocaine (XYLOCAINE) 2 % topical gel Apply topically as needed for moderate pain 10 ML every 3 weeks   Marivel Barcenas APRN CNP   loratadine (CLARITIN) 10 MG tablet Take 1 tablet (10 mg) by mouth daily   Marivel Barcenas APRN CNP   menthol-zinc oxide (CALMOSEPTINE) 0.44-20.625 % OINT ointment Apply topically 4 times daily as needed for skin protection   Marivel Barcenas APRN CNP   ondansetron (ZOFRAN ODT) 4 MG ODT tab Take 1 tablet (4 mg) by mouth daily as needed for nausea 30 minutes before getting up in your wheelchair   Marivel Barcenas APRN CNP   order for DME Equipment being ordered: Tegaderm 4x4   MICHELLE 6 months  Sacral wound   Marivel Barcenas APRN CNP   order for DME Equipment being ordered: KARALIT gel-T cushion 20 inches wide & 18 inches deep    height 1.676 m (5' 6\"), weight 101.6 kg (223 lb 15.8 oz)   Marivel Barcenas APRN CNP   pravastatin (PRAVACHOL) 10 MG tablet Take 1 tablet (10 mg) by " mouth daily   Marivel Barcenas APRN CNP   prazosin (MINIPRESS) 1 MG capsule Take 1 capsule (1 mg) by mouth At Bedtime   Marivel Barcenas APRN CNP   predniSONE (DELTASONE) 5 MG tablet Take 1 tablet (5 mg) by mouth daily   Marivel Barcenas APRN CNP   simethicone (MYLICON) 125 MG CHEW Take 1 tablet (125 mg) by mouth as needed for intestinal gas      tacrolimus (GENERIC EQUIVALENT) 1 MG capsule Take 2 capsules (2 mg) by mouth 2 times daily   Marcus Ley MD   VITAMIN D, CHOLECALCIFEROL, PO Take 4,000 Units by mouth 2 times daily   Unknown, Entered By History

## 2019-06-03 NOTE — PROVIDER NOTIFICATION
MD Notification    Notified Person: MD    Notified Person Name: Dr. Brady    Notification Date/Time: 6/3/19 0214    Notification Interaction: page    Purpose of Notification: order for BiPAP    Orders Received:    Comments:

## 2019-06-03 NOTE — PLAN OF CARE
Discharge Planner SLP   Patient plan for discharge: Did not discuss  Current status: Pt seen for swallow evaluation. She is lethargic, keeps eyes close most of evaluation but does respond to questions with head nods/shakes. Unable to complete oral motor exam d/t pt's level of alertness and difficulty with directions. Pt tolerated 4 oz of thin liquids by cup sip with hand over hand assistance. Pt presented with confusion regarding use of a straw, intermittently biting and not sucking on the straw. Pt tolerated 3 oz of puree. Small bites of less   cracker revealed insufficient mastication, bolus formation and oral clearance. She appears to have missing dentition, however unable to visualize well. Adequate timing of swallow, reduced oral control and coordination, sufficient hyolaryngeal excursion. Mild-moderate oropharyngeal dysphagia secondary to altered mental status and fluctuating alertness.     Recommend dysphagia diet 1/puree and thin liquids. Pt must be upright and fully alert for PO. She will require 1:1 assistance and supervision with PO. Meds given in puree, crush as needed and ensure oral cavity is clear of pills after given.     Barriers to return to prior living situation: Lethargy, aspiration risk  Recommendations for discharge: home with assistance  Rationale for recommendations: SLP at next level of care for management of dysphagia pending clinical progression during acute hospitalization          Entered by: Nanci Browne 06/03/2019 11:38 AM

## 2019-06-03 NOTE — PLAN OF CARE
Pt lethargic, arouses to voice. Slow to respond, mostly nonverbal. VSS on RA, except hypertension, scheduled med given. BiPAP overnight, pt refused at times. Denied pain. Johnson patent w/ adequate, cloudy output. Clear liquid diet. L PIV SL. R PIV infusing NS @ 100 ml/hr. Total care.

## 2019-06-03 NOTE — ED PROVIDER NOTES
History     Chief Complaint:  Altered Mental Status (AMS since this AM. Not answering questions appropriately. Hx of renal transplant. Indwelling cath. Hx of urosepsis. Always on CPAP at home. Quad vs paraplegic.)      The history is limited by the condition of the patient.        Gem Jade is a 73 year old female with a history of UTI's who presents to the emergency department today for evaluation of altered mental status. She is status post renal transplant. She has diabetes, and has a catheter. She has a history of UTI's. She has LUQ tenderness. She states she is having abdominal pain. She denies headache chest pain shortness or breath.    Allergies:  Atorvastatin Calcium  Codeine  Darvocet [Propoxyphene N-Apap]  Hydromorphone  Levofloxacin  Morphine  Niaspan [Niacin]  Percocet [Oxycodone-Acetaminophen]  Vicodin [Hydrocodone-Acetaminophen]      Medications:    Acetaminophen Po  Amlodipine (Norvasc) 5 Mg Tablet  Aspirin 81 Mg Tablet  Baclofen (Lioresal) 10 Mg Tablet  Blood Glucose (Accu-chek Compact Plus) Test Strip  Comfort Ez Pen Needles 31g X 6 Mm Miscellaneous  Cranberry 400 Mg Tabs  Cyanocobalamin (Vitamin B12) 1000 Mcg/ml Injection  Diphenoxylate-atropine (Lomotil) 2.5-0.025 Mg Tablet  Famotidine (Pepcid) 20 Mg Tablet  Gabapentin (Neurontin) 300 Mg Capsule  Insulin Aspart (Novolog Flexpen) 100 Unit/ml Pen  Insulin Glargine (Lantus Solostar Pen) 100 Unit/ml Pen  Labetalol (Normodyne) 300 Mg Tablet  Lactobacillus Rhamnosus, Gg, (Culturell) Capsule  Levetiracetam (Keppra) 750 Mg Tablet  Levothyroxine (Synthroid/levothroid) 50 Mcg Tablet  Lidocaine (Xylocaine) 2 % Topical Gel  Loratadine (Claritin) 10 Mg Tablet  Menthol-zinc Oxide (Calmoseptine) 0.44-20.625 % Oint Ointment  Ondansetron (Zofran Odt) 4 Mg Odt Tab  Pravastatin (Pravachol) 10 Mg Tablet  Prazosin (Minipress) 1 Mg Capsule  Prednisone (Deltasone) 5 Mg Tablet  Simethicone (Mylicon) 125 Mg Chew  Tacrolimus (Generic Equivalent) 1 Mg  Capsule  Vitamin D, Cholecalciferol, Po      Past Medical History:    Background Diabetic Retinopathy(362.01)   Diabetic Gastroparesis   Diarrhea   Dizziness And Giddiness   Hyponatremia   Kidney Replaced By Transplant   Mixed Hyperlipidemia   Obesity   Osteopenia   Other And Unspecified Hyperlipidemia   Other Pulmonary Embolism And Infarction   Polyneuropathy In Diabetes   Primary Localized Osteoarthrosis, Lower Leg   Pyelonephritis, Unspecified   Type 2 Diabetes Mellitus With Complications   Urinary Tract Infection, Site Not Specified   Uti (Urinary Tract Infection) Due To Urinary Indwelling Catheter    Past Surgical History:    Kidney transplant  Knee replacement  Cataract repair  Left heart catheterization    Family History:    Diabetes  Parkinson's  Hypertension  Heart Disease    Social History:  Smoking Status: former smoker  Smokeless Tobacco: no  Alcohol Use: no   Marital Status:   [2]     Review of Systems   Unable to perform ROS: Mental status change   Gastrointestinal: Positive for abdominal pain.       Physical Exam     Patient Vitals for the past 24 hrs:   BP Temp Temp src Pulse Resp SpO2   06/02/19 2312 183/87 -- -- 58 -- 97 %   06/02/19 2230 151/77 -- -- 62 -- 98 %   06/02/19 2200 152/70 -- -- 58 -- 98 %   06/02/19 2130 144/71 -- -- 59 -- 100 %   06/02/19 2045 172/80 -- -- -- -- 96 %   06/02/19 2030 166/73 98.2  F (36.8  C) Rectal 59 12 98 %        Physical Exam  VS: Reviewed per above  HENT: Mucous membranes moist  EYES: sclera anicteric  CV: Rate as noted, regular rhythm.   RESP: Effort normal. Breath sounds are distant  GI: LUQ tenderness, not distended.  NEURO:  GCS:   Motor 6=Obeys commands   Verbal 4=Confused   Eye Opening 2=To pain   Total: 12     Sleepy, rouses to jaw thrust/painful stimuli, follows commands x4 extremities (wiggles fingers and toes), Pupils equal.  MSK: No deformity of the extremities  SKIN: Warm and dry    Emergency Department Course   ECG (20:31:46):  Rate 59 bpm. AK  interval 196. QRS duration 84. QT/QTc 454/449. P-R-T axes 112 5 45. Sinus bradycardia. Otherwise normal ECG. No significant change from ECG dated 4/24/19 Interpreted at 2034 by Cayetano Cline MD.     Imaging:  Radiographic findings were communicated with the patient who voiced understanding of the findings.    CT chest abdomen pelvis  IMPRESSION:  1. Mild hazy opacity in the left lower lung may be pneumonia.  2. No other acute findings.  3. Atrophic native kidneys with right lower quadrant renal transplant.  Stable right renal lesion which may be a hemorrhagic cyst or small  solid lesion.  4. Redemonstrated cystic lesion in the pancreatic body. This could be  an IPMN.  Reading per radiology     Head CT w/o contrast  IMPRESSION: No acute intracranial abnormality.  Reading per radiology     Laboratory:  Laboratory findings were communicated with the patient who voiced understanding of the findings.     ISTAT blood gas and oxyhgb: pH: 7.32, PCO2: 55, PO2: 57, Bicarbonate: 28, Oxyhgb: 86, Lactic Acid: 0.8    CBC: WNL (WBC 7.0, HGB 10.8 L, )     CMP: Glucose 112 H, Bun 45 H, GFR 27 L (Creatinine 1.82 H)     Blood cultures: Pending     Troponin (Collected 2028): <0.015     Urine Culture Aerobic Bacterial: Pending     UA RESULTS:  Lab Test 06/02/19 2028   COLOR Light Yellow   APPEARANCE Cloudy   URINEGLC Negative   URINEBILI Negative   URINEKETONE Negative   SG 1.010   UBLD Small*   URINEPH 5.5   PROTEIN 100*   UROBILINOGEN  --    NITRITE Positive*   LEUKEST Large*   RBCU 117   WBCU >182   WBC clumps Present   Bacteria many   Yeast Urine many   Mucus urine present      UA RESULTS:  Lab Test 06/02/19 2122   COLOR Yellow   APPEARANCE Cloudy   URINEGLC Negative   URINEBILI Negative   URINEKETONE Negative   SG 1.011   UBLD Moderate*   URINEPH 6.0   PROTEIN 100*   UROBILINOGEN  --    NITRITE Positive*   LEUKEST Large*   RBCU 117*   WBCU >182*       Emergency Department Course:  Past medical records, nursing notes,  and vitals reviewed.  2034: I performed an exam of the patient and obtained history, as documented above. GCS 6.    The patient provided a urine sample here in the emergency department. This was sent for laboratory testing, findings above.     2100: I rechecked the patient.     2205: I rechecked the patient.     2221: I rechecked the patient. Explained findings to patient.     Findings and plan explained to the Patient who consents to admission.     2333: Discussed the patient with Dr. Brady, who will admit the patient to a [facility] bed for further monitoring, evaluation, and treatment.         Impression & Plan      Medical Decision Making:  Patient presents to the ER via EMS with concern for altered mental status.  Initial vital signs unremarkable.  Exam reveals somnolence but arouses to painful stimuli and answers questions and wiggles fingers and toes to command.  EKG does not reveal evidence of dysrhythmia or ischemia and a single troponin is negative.  VBG does not reveal significant hypercarbia.  Head CT is negative for acute intracranial process.  Labs did reveal normal lactic acid without leukocytosis.  Cath urinalysis after catheter exchange appeared grossly infected.  I reviewed the chart and see that she has a history of VRE as well as ESBL urinary pathogens.  She was given ertapenem while awaiting urine culture.  I did talk to infectious disease and urine Gram stain was recommended to try and steer appropriate antibiotic use while awaiting culture.  Unfortunately this was not resulted during my shift.  Due to altered mental status and significant tenderness in the left upper quadrant/left chest, CT imaging of the chest and abdomen was obtained without contrast due to renal impairment.  There is a possible left lower pneumonia but no other acute process.  Patient remained stable in the ER prior to admission.    Diagnosis:    ICD-10-CM    1. Altered mental status, unspecified altered mental status type R41.82     2. Urinary tract infection associated with indwelling urethral catheter, initial encounter (H) T83.511A     N39.0        Disposition:  Admitted to inpatient med bed    Arianna Haas  6/2/2019    EMERGENCY DEPARTMENT  Scribe Disclosure:  I, Arianna Haas, am serving as a scribe at 8:34 PM on 6/2/2019 to document services personally performed by Cayetano Cline MD based on my observations and the provider's statements to me.       Cayetano Cline MD  06/03/19 0124

## 2019-06-03 NOTE — PROGRESS NOTES
06/03/19 1129   General Information   Onset Date 06/02/19   Start of Care Date 06/03/19   Referring Physician Catrachito Robert MD   Patient Profile Review/OT: Additional Occupational Profile Info See Profile for full history and prior level of function   Patient/Family Goals Statement None stated    Swallowing Evaluation Bedside swallow evaluation   Behaviorial Observations Lethargic   Mode of current nutrition Oral diet   Type of oral diet Thin liquid  (clear liquids )   Respiratory Status Room air   Comments Gem Jade is a 73 year old female admitted on 6/2/2019. She presents to the emergency department with decreased level of alertness consistent with prior episodes of encephalopathy with urinary tract infections. Pt has been seen by SLP department in the past and ultimately discharged on dysphagia diet 3 and thin liquids. It appears pt has similar presentations when admitted to the hospital requiring soft foods/puree but is eventually able to advance to DD3. No family present. Pt lethargic, keeps eyes close most of evaluation but does use head nod/shakes to answer questions    Clinical Swallow Evaluation   Oral Musculature unable to assess due to poor participation/comprehension   Structural Abnormalities none present   Dentition present and adequate  (appears to have only partial dentition )   Mucosal Quality adequate   Additional Documentation Yes   Swallow Eval   Feeding Assistance dependent   Clinical Swallow Eval: Thin Liquid Texture Trial   Mode of Presentation, Thin Liquids cup;straw;self-fed;fed by clinician   Volume of Liquid or Food Presented 4 oz    Oral Phase of Swallow WFL   Pharyngeal Phase of Swallow intact   Diagnostic Statement Pt tolerated 4 oz of thin liquids without overt s/sx of aspiration, change in breath sounds or vocal quality. Hand over hand assistance needed for drinking, pt having confusion regarding how to use straws    Clinical Swallow Eval: Puree Solid Texture Trial    Mode of Presentation, Puree spoon;fed by clinician   Volume of Puree Presented 3 oz    Oral Phase, Puree WFL   Pharyngeal Phase, Puree intact   Diagnostic Statement No overt s/sx of aspiration, sufficient oral clearance    Clinical Swallow Eval: Solid Food Texture Trial   Mode of Presentation, Solid fed by clinician   Volume of Solid Food Presented 1/4 cracker    Oral Phase, Solid Poor AP movement;Residue in oral cavity   Oral Residue, Solid mid posterior tongue;left anterior lateral sulci;right anterior lateral sulci   Pharyngeal Phase, Solid intact   Diagnostic Statement Insufficient mastication, bolus formation and oral clearance. No s/sx of aspiration    Swallow Compensations   Swallow Compensations Alternate viscosity of consistencies;Pacing   Results No difficulties noted   Esophageal Phase of Swallow   Patient reports or presents with symptoms of esophageal dysphagia No   General Therapy Interventions   Planned Therapy Interventions Dysphagia Treatment   Dysphagia treatment Modified diet education;Instruction of safe swallow strategies   Swallow Eval: Clinical Impressions   Skilled Criteria for Therapy Intervention Skilled criteria met.  Treatment indicated.   Functional Assessment Scale (FAS) 4   Treatment Diagnosis Mild-moderate oropharyngeal dysphagia    Diet texture recommendations Dysphagia diet level 1;Thin liquids   Recommended Feeding/Eating Techniques check mouth frequently for oral residue/pocketing;alternate between small bites and sips of food/liquid;maintain upright posture during/after eating for 30 mins;small sips/bites   Therapy Frequency 5 times/wk   Predicted Duration of Therapy Intervention (days/wks) 1 week   Anticipated Discharge Disposition home w/ assist   Risks and Benefits of Treatment have been explained. Yes   Patient, family and/or staff in agreement with Plan of Care Yes   Clinical Impression Comments Pt seen for swallow evaluation. She is lethargic, keeps eyes close most of  evaluation but does respond to questions with head nods/shakes. Unable to complete oral motor exam d/t pt's level of alertness and difficulty with directions. Pt tolerated 4 oz of thin liquids by cup sip with hand over hand assistance. Pt presented with confusion regarding use of a straw, intermittently biting and not sucking on the straw. Pt tolerated 3 oz of puree. Small bites of less   cracker revealed insufficient mastication, bolus formation and oral clearance. She appears to have missing dentition, however unable to visualize well. Adequate timing of swallow, reduced oral control and coordination, sufficient hyolaryngeal excursion. Mild-moderate oropharyngeal dysphagia secondary to altered mental status and fluctuating alertness. Recommend dysphagia diet 1/puree and thin liquids. Pt must be upright and fully alert for PO. She will require 1:1 assistance and supervision with PO. Meds given in puree, crush as needed and ensure oral cavity is clear of pills after given.    Total Evaluation Time   Total Evaluation Time (Minutes) 22

## 2019-06-03 NOTE — PLAN OF CARE
PT:  Orders received and chart reviewed. Per chart review, patient admitted late last evening for encephalopathy due to suspect UTI. Will hold therapy intervention for 24 hours to allow for medical management prior to initiating evaluation in order to allow patient to more meaningfully participate in therapy and better provide proper discharge recommendations.

## 2019-06-03 NOTE — PLAN OF CARE
ROBBY orientation, pt very lethargic all shift. Per family, this is normal for her the first 1-2 days at the start of a UTI. VSS ex hypertension, scheduled BP meds. ROBBY pain but according to FLACC scale pt is not showing any signs of pain. Ax2, turn and repo q2h. Speech consulted and pt placed on pureed foods with thin liquids. Chronic indwelling rico catheter, adequate UOP. BS active, No BM this shift, for now assuming incontinence d/t pt lethargy. BG checks before meals, pt unable to eat anything today except applesauce with medications.PIV infusing NS @ 100. , Keshawn, updated and said he will be in today.

## 2019-06-03 NOTE — ED TRIAGE NOTES
Per EMS report, altered mental status since this AM. Not answering questions appropriately. Hx of renal transplant. Indwelling cath. Hx of urosepsis. Always on CPAP at home. Paraplegic. Baseline mostly A&O at home, per . Pt's  is Keshawn. Number [phone].

## 2019-06-03 NOTE — PLAN OF CARE
ROBBY orientation, lethargic. Nods head yes or no. VSS on RA, wears CPAP at night. Denies pain. Johnson patent with AUO. R PIV infusing NS @ 100ml/hr, L PIV SL'd. Pureed diet, thin liquids- no appetite. Takes pills in applesauce. Skin dry and flaky, redness blanchable on bottom- repositioning Q2. Interdry placed between skin folds.  updated on plan of care, starting IV Invanz this evening, discharge date pending.

## 2019-06-03 NOTE — ED NOTES
Bed: ED12  Expected date: 6/2/19  Expected time: 8:07 PM  Means of arrival:   Comments:  534  73F  Atypical UTI/AMS/Stable

## 2019-06-03 NOTE — PROGRESS NOTES
[facility]    Medicine Progress Note - Hospitalist Service        Date of Admission:  6/2/2019  8:13 PM    Assessment & Plan:   Gem Jade is a 73 year old female admitted on 6/2/2019. She presents to the emergency department with decreased level of alertness consistent with prior episodes of encephalopathy with urinary tract infections.     Acute encephalopathy, suspect secondary to catheter related urinary tract infection  History of chronic indwelling Johnson  -History of frequent urinary tract infections associated with chronic indwelling Johnson catheter.  Most recent urine cultures 4/24/2019 and 5/10/2019 demonstrated greater than 100,000 colony-forming units of Citrobacter.  Has a history of VRE, MRSA, and ESBL.  Infectious disease was contacted in the emergency department by Dr. Cline and recommended ertapenem as well as a urine Gram stain to help with determining patient's antibiotic tailoring.  Note that patient has followed with infectious disease clinic in the past related to recurrent urinary tract infections with colonization.  Patient on multiple sedating medications including baclofen, gabapentin.   -Continue indwelling Johnson catheter for retention; Catheter exchanged in the emergency department  -Intake and output, daily weights  -Monitor renal function  -Urine culture pending  -Urine Gram stain pending from emergency department  -Continue ertapenem as initiated in the emergency department.  Patient has a history of MRSA, VRE, and ESBL historically in the urine.  Most recent urine culture with Citrobacter and most recent antibiotic therapy guided through infectious disease clinic included ciprofloxacin; by most recent progress note 5/28/2019, patient was on ciprofloxacin 500 mg twice daily for 6 days 4/27/2019.    -Blood culture x2 pending  -Prior to admission baclofen, gabapentin held.   -Tacrolimus level pending  -Keppra level pending     Obstructive sleep apnea on CPAP:  Mild elevation in PCO2 on VBG in the emergency department, though does not seem to be primary cause of patient's somnolence.  -CPAP as per home settings     Possible left lower lobe pneumonia: Patient with no cough, no fevers, no shaking chills.  With patient's degree of somnolence, more likely represents atelectasis.  Patient does have encephalopathy, though this is common for her in the setting of volume depletion and urinary tract infections.  -Procalcitonin added on  -Continuing ertapenem as above, though this is primarily for treatment of pyuria and suspected urinary tract infection.  Low suspicion for pneumonia currently.     Type 2 diabetes with retinopathy, nephropathy, neuropathy including diabetic gastroparesis: Prior to admission dosing of Lantus appears to be 30 units at bedtime, NovoLog 10 units 3 times daily with meals and sliding scale insulin.  Last hemoglobin A1c of 6.2 2019.  -Hemoglobin A1c pending  -Prior to admission Lantus has been ordered as 10 units at bedtime while awaiting pharmacy reconciliation and improvement in encephalopathy.  -Medium dose sliding scale insulin as well as 1 unit per 20 g carbohydrate prandial insulin.  -Continue prior to admission aspirin 81 mg daily     End-stage renal disease status post historic right lower quadrant renal transplant:   -Renal transplant in .  Uncertain if this was living or  donor.  -Continue prednisone 5 mg daily at this time;   -Tacrolimus 2 mg twice daily has not yet been reordered.  Await tacrolimus level this a.m. and pharmacy reconciliation.  Patient has had a history of elevated tacrolimus levels in the setting of renal injury.  -Renal function currently appears to be around baseline     Nightmares:  -Prior to admission prazosin 1 mg at bedtime has been ordered.  Not yet pharmacy reconciled, though mentioned in multiple recent provider notes.     Severe obesity: Increased risk of all cause mortality  -Daily weights, intake  and output  -Weight loss as able.  Patient limited from a mobility standpoint given she is wheelchair/bedbound by history.  -Physical therapy consulted for mobilization to prevent pressure injuries while hospitalized     History of provoked bilateral PE:   -not on anticoags  -Subcutaneous heparin for DVT prophylaxis     Paroxysmal atrial fibrillation: Note 5/27/2016 EKG demonstrating A. fib with RVR in the setting of neurovirus with diarrhea.  Underwent 48-hour Holter monitoring in May 2016 demonstrating sinus rhythm with occasional supraventricular tachycardia.  No atrial fibrillation reported on that study.  Currently in sinus rhythm.  At time of event in 2016, recognition was for discussion with primary care provider regarding risk versus benefit of anticoagulation.  I do not believe patient has had any recurrent events of atrial fibrillation since, though all EKGs were not reviewed since this timeframe.  [id number] score of 3 for age, female sex, diabetes.  -Defer further discussion of anticoagulation to primary care provider.       History of seizure disorder:   -Keppra level pending for this a.m; anticipate starting PTA Keppra following level.      Hypertension:  -Continue prior to admission amlodipine 10 milligrams daily  -As needed labetalol available for persistent hypertension greater than 180     Cystic finding in the pancreatic body.  Incidental on extensive imaging in the emergency department.  Has been noted previously as well.  Question of intraductal papillary mucinous neoplasm.  Patient follows with Dr. Chen of gastroenterology, largely for her gastroparesis.  -We will defer any further follow-up to outpatient gastroenterology.     Hypothyroidism:  -Continue prior to admission levothyroxine 50 mcg daily once verified by the pharmacy  -Last TSH checked a year ago, will repeat today       Diet: Advance Diet as Tolerated: Clear Liquid Diet     DVT Prophylaxis: Pneumatic Compression Devices   Johnson  "Catheter: in place, indication:    Code Status: Full Code     Disposition Plan    Expected discharge: 2 - 3 days, recommended to TBD  Entered: Catrachito Robert MD 06/03/2019, 8:33 AM        The patient's care was discussed with the Bedside Nurse and Patient.    Catrachito Robert MD  Hospitalist Service  [facility]    ______________________________________________________________________    Interval History   Continues to be encephalopathic although now responding to simple 1 or 2 verbal commands.  Afebrile.    Data reviewed today: I reviewed all medications, new labs and imaging results over the last 24 hours. I personally reviewed no images or EKG's today.    Physical Exam   Vital signs:  Temp: 97.1  F (36.2  C) Temp src: Oral BP: 148/56 Pulse: 57 Heart Rate: 54 Resp: 12 SpO2: 90 % O2 Device: None (Room air)        Estimated body mass index is 35.1 kg/m  as calculated from the following:    Height as of 2/28/19: 1.753 m (5' 9\").    Weight as of 4/27/19: 107.8 kg (237 lb 10.5 oz).      Wt Readings from Last 2 Encounters:   04/27/19 107.8 kg (237 lb 10.5 oz)   03/03/19 113 kg (249 lb 1.9 oz)       Gen: Encephalopathic, opens eyes to verbal stimuli, following 1 or 2 simple commands but falls asleep easily  HEENT: Supple neck, moist oral mucosa, no pallor  Resp: CTA B/L, normal WOB, no crackles, no wheezes  CVS: RRR, no murmur  Abd/GI: Soft, non-tender. BS- normoactive.    Skin: Warm, dry no rashes  MSK: No joint deformities, no pedal edema  Neuro- No focal deficits on my gross exam, difficult to get an accurate assessment due to encephalopathy    Data   Recent Labs   Lab 06/03/19  0757 06/02/19 2028   WBC  --  7.0   HGB  --  10.8*   MCV  --  93    177   NA  --  142   POTASSIUM  --  4.9   CHLORIDE  --  109   CO2  --  27   BUN  --  45*   CR  --  1.84*   ANIONGAP  --  6   ROGER  --  8.9   GLC  --  112*   ALBUMIN  --  3.2*   PROTTOTAL  --  8.2   BILITOTAL  --  0.3   ALKPHOS  --  117   ALT  --  28 "   AST  --  17   TROPI  --  <0.015       Recent Results (from the past 24 hour(s))   Head CT w/o contrast    Narrative    CT HEAD WITHOUT CONTRAST  6/2/2019 11:06 PM     HISTORY: Altered level of consciousness (LOC), unexplained. Patient is  not answering questions appropriately.    TECHNIQUE:  Axial images of the head and coronal reformations without  contrast. Radiation dose for this scan was reduced using automated  exposure control, adjustment of the mA and/or kV according to patient  size, or iterative reconstruction technique.    COMPARISON: 4/24/2019.    FINDINGS: Mild cortical atrophy. Mild patchy low-attenuation areas are  present in white matter of the cerebral hemispheres that are  nonspecific but consistent with chronic small vessel ischemic changes  in this age patient. No intracranial hemorrhage, mass or mass effect.   No acute infarct identified. No shift of midline structures. No skull  fracture.      Impression    IMPRESSION: No acute intracranial abnormality.    CITLALLI GOMEZ MD   CT Chest Abdomen Pelvis w/o Contrast    Narrative    CT CHEST/ABDOMEN/PELVIS WITHOUT CONTRAST 6/2/2019 11:08 PM     HISTORY: History of renal transplant; left low chest/left upper  abdominal tenderness to palpation.    TECHNIQUE: Volumetric acquisition through chest, abdomen and pelvis  without contrast.  Radiation dose for this scan was reduced using  automated exposure control, adjustment of the mA and/or kV according  to patient size, or iterative reconstruction technique.    COMPARISON: 3/5/2019.    FINDINGS:     Chest: Small area of mild hazy opacity in the left lower lung which  could be atelectasis or pneumonia. Mild linear atelectasis or scarring  in the right lower lung. Normal heart size. Coronary artery and mitral  annular calcifications. Thoracic aortic calcification. No enlarged  mediastinal or hilar lymph nodes.    Small bilateral pleural effusions and bibasilar atelectasis seen  previously have  resolved.    Abdomen and pelvis: Marked renal cortical atrophy. Well-circumscribed,  rounded slightly increased density lesion measuring 1.7 cm arising  from the right mid kidney anteriorly is stable. No hydronephrosis.  Gallbladder not seen. 2.9 cm cyst in the pancreatic body is stable.  The upper abdominal organs are otherwise negative without contrast.    Renal transplant in the right lower quadrant without hydronephrosis or  associated perirenal fluid collections. Johnson catheter decompressing  the urinary bladder making slight bladder wall thickening difficult to  exclude. Correlate with urinalysis. Uterus is present. No suspicious  adnexal masses. Scattered colonic diverticula. No bowel obstruction or  other acute findings.      Impression    IMPRESSION:  1. Mild hazy opacity in the left lower lung may be pneumonia.  2. No other acute findings.  3. Atrophic native kidneys with right lower quadrant renal transplant.  Stable right renal lesion which may be a hemorrhagic cyst or small  solid lesion.  4. Redemonstrated cystic lesion in the pancreatic body. This could be  an IPMN.    CITLALLI GOMEZ MD     Medications     sodium chloride 100 mL/hr at 06/03/19 0642       amLODIPine  10 mg Oral Daily     aspirin  81 mg Oral Daily     ertapenem (INVanz) IV  1 g Intravenous Q24H     famotidine  20 mg Oral BID     heparin  5,000 Units Subcutaneous Q12H     insulin aspart  1-7 Units Subcutaneous TID AC     insulin aspart  1-5 Units Subcutaneous At Bedtime     insulin aspart   Subcutaneous TID w/meals     insulin glargine  20 Units Subcutaneous At Bedtime     prazosin  1 mg Oral At Bedtime     predniSONE  5 mg Oral Daily     sodium chloride (PF)  3 mL Intracatheter Q8H

## 2019-06-03 NOTE — H&P
[facility]    History and Physical - Hospitalist Service       Date of Admission:  6/2/2019    Assessment & Plan   Gem Jade is a 73 year old female admitted on 6/2/2019. She presents to the emergency department with decreased level of alertness consistent with prior episodes of encephalopathy with urinary tract infections.    Encephalopathy, suspect secondary to catheter related urinary tract infection: History of frequent urinary tract infections associated with chronic indwelling Johnson catheter.  Most recent urine cultures 4/24/2019 and 5/10/2019 demonstrated greater than 100,000 colony-forming units of Citrobacter.  Has a history of VRE, MRSA, and ESBL.  Infectious disease was contacted in the emergency department by Dr. Clien and recommended ertapenem as well as a urine Gram stain to help with determining patient's antibiotic tailoring.  Note that patient has followed with infectious disease clinic in the past related to recurrent urinary tract infections with colonization.  Patient on multiple sedating medications including baclofen, gabapentin.   -Continue indwelling Johnson catheter for retention; Catheter exchanged in the emergency department  -Intake and output, daily weights  -Monitor renal function  -Urine culture pending  -Urine Gram stain pending from emergency department  -Continue ertapenem as initiated in the emergency department.  Patient has a history of MRSA, VRE, and ESBL historically in the urine.  Most recent urine culture with Citrobacter and most recent antibiotic therapy guided through infectious disease clinic included ciprofloxacin; by most recent progress note 5/28/2019, patient was on ciprofloxacin 500 mg twice daily for 6 days 4/27/2019.  I do see a telephone note 5/13 describing recommendations from infectious disease to discontinue antibiotics.  Uncertain if patient was still on ciprofloxacin at that time as well.  -Blood culture x2 pending  -2 L normal  saline over 4 hours.  Patient appears clinically dry, and historically is actually responded to fluid administration during prior episodes of encephalopathy.  -Prior to admission baclofen, gabapentin held.  I have actually sent a gabapentin level given patient's renal dysfunction and recurrent episodes of decreased level of alertness.   -Tacrolimus level pending  -Keppra level pending    Obstructive sleep apnea on CPAP: Mild elevation in PCO2 on VBG in the emergency department, though does not seem to be primary cause of patient's somnolence.  -CPAP as per home settings    Possible left lower lobe pneumonia: Patient with no cough, no fevers, no shaking chills.  With patient's degree of somnolence, more likely represents atelectasis.  Patient does have encephalopathy, though this is common for her in the setting of volume depletion and urinary tract infections.  -Procalcitonin added on  -Continuing ertapenem as above, though this is primarily for treatment of pyuria and suspected urinary tract infection.  Low suspicion for pneumonia currently.    Type 2 diabetes with retinopathy, nephropathy, neuropathy including diabetic gastroparesis: Prior to admission dosing of Lantus appears to be 30 units at bedtime, NovoLog 10 units 3 times daily with meals and sliding scale insulin.  Last hemoglobin A1c of 6.2 2019.  -Hemoglobin A1c pending  -Prior to admission Lantus has been ordered as 20 units at bedtime while awaiting pharmacy reconciliation.  -Medium dose sliding scale insulin as well as 1 unit per 20 g carbohydrate prandial insulin.  -Continue prior to admission aspirin 81 mg daily    End-stage renal disease status post historic right lower quadrant renal transplant: Renal transplant in .  Uncertain if this was living or  donor.  -Continue prednisone 5 mg daily at this time; dose may need to be adjusted pending pharmacy reconciliation, though I anticipate that this is appropriate given distant  transplant status.  -Tacrolimus 2 mg twice daily has not yet been reordered.  Await tacrolimus level this a.m. and pharmacy reconciliation.  Patient has had a history of elevated tacrolimus levels in the setting of renal injury.  -Amlodipine 10 mg daily continues; calcium channel blockers can increase tacrolimus concentrations to lower necessary dose.    Nightmares:  -Prior to admission prazosin 1 mg at bedtime has been ordered.  Not yet pharmacy reconciled, though mentioned in multiple recent provider notes.    Morbid obesity: Increased risk of all cause mortality  -Daily weights, intake and output  -Weight loss as able.  Patient limited from a mobility standpoint given she is wheelchair/bedbound by history.  -Physical therapy consulted for mobilization to prevent pressure injuries while hospitalized     History of provoked bilateral PE: Noted on review of problem list and and history list. I note historic lower extremity ultrasound following knee surgery in 2003 which did not demonstrate DVT.  On review of CT imaging and ultrasound results through our hospital system, I do not note any prior positive DVT study, though history notes bilateral pulmonary emboli in March 2003 following knee surgery (US eval at that time negative for LE thrombus).  -Subcutaneous heparin for DVT prophylaxis    Paroxysmal atrial fibrillation: Note 5/27/2016 EKG demonstrating A. fib with RVR in the setting of neurovirus with diarrhea.  Underwent 48-hour Holter monitoring in May 2016 demonstrating sinus rhythm with occasional supraventricular tachycardia.  No atrial fibrillation reported on that study.  Currently in sinus rhythm.  At time of event in 2016, recognition was for discussion with primary care provider regarding risk versus benefit of anticoagulation.  I do not believe patient has had any recurrent events of atrial fibrillation since, though all EKGs were not reviewed since this timeframe.  [id number] score of 3 for age, female  sex, diabetes.  -Defer further discussion of anticoagulation to primary care provider.  At this time, I am not monitoring on telemetry    History of seizure disorder: Details of this are not entirely clear to me  -Keppra level pending for this a.m; anticipate a.m. dose of 750 mg Keppra following level.     Hypertension:  -Continue prior to admission amlodipine 10 milligrams daily  -As needed labetalol available for persistent hypertension greater than 180    Cystic finding in the pancreatic body.  Incidental on extensive imaging in the emergency department.  Has been noted previously as well.  Question of intraductal papillary mucinous neoplasm.  Patient follows with Dr. Chen of gastroenterology, largely for her gastroparesis.  -We will defer any further follow-up to outpatient gastroenterology.        Diet: Advance diet as tolerated  DVT Prophylaxis: Pneumatic compression devices  Johnson Catheter: in place, indication:   Neurogenic bladder in the setting of diabetes  Code Status: Full code.  Confirmed with patient on admission    Disposition Plan   Expected discharge: 2 - 3 days, recommended to prior living arrangement once Encephalopathy resolved, antibiotic plan established pending culture data.  Entered: Juan Brady MD 06/03/2019, 12:29 AM     The patient's care was discussed with the Patient, Dr Cline in the emergency department.    Juan Brady MD  [facility]    ______________________________________________________________________    Chief Complaint   Decreased level of alertness    History is obtained from patient, chart review, discussion with Dr. Cline in the emergency department.  Patient's ability to provide a history is significantly limited in the setting of sleepiness and questionable encephalopathy.  Patient gives answers and subsequently reverses her answers within seconds suggesting her ability to provide a history is inaccurate or and adequate and consistent  with encephalopathy diagnosis.    History of Present Illness   Gem Jade is a 73 year old female who presents with decreased level of alertness in the setting of multiple medical issues including essentially bedbound status in the setting of morbid obesity, diabetic neuropathy.  Patient has a chronic indwelling Johnson catheter for neurogenic bladder/retention, and has a history of frequent catheter related urinary tract infections resulting in similar presentations for encephalopathy.  Last hospitalization 4/24/2019 at which time Citrobacter was cultured from urine and patient was treated with 6-day course of ciprofloxacin following infectious disease consultation.  At that time, patient again with encephalopathy and decreased level of alertness.    In review of notes, it appears that patient was taken off of anti-infectives following this 6-day course (see 5/28/2019 progress note).  This said, it is possible that patient received additional courses of anti-infective therapy through outpatient clinic not reviewable through [facility] records as there is a telephone encounter 5/13/2019 recommending discontinuation of antibiotics for colonization of urine.  Unclear if patient was on anti-infectives mid-May.  Patient is not able to describe this to me.    Patient is sleepy, though awakens to voice and provides some history.  She reports that she feels as though she typically does when she has a urine infection.  No fevers, no shaking chills.  Has some pain which is reproducible in her left breast.  No other pain.    As above, history is limited in the setting of encephalopathy and decreased level of alertness.    CT chest abdomen pelvis was performed in the emergency department with a suggestion of left lower lung opacity represent atelectasis versus pneumonia. No shortness of breath or cough reported by patient or observed in the emergency department by myself.    In chart review, it appears that patient has had  ongoing episodes of confusion intermittently, not always associated with acute urinary tract infections.  Somewhat suggestive of episodes of delirium at home.  By one telephone encounter, it appears that patient actually had improvement of her mental status after patient's  to push fluids over the course of the day.  Today in the emergency department, patient is unable to tell me how she has been eating and drinking in the past days, though her mouth is extremely dry.  Difficult to assess total body volume given morbid obesity.  Does have lower extremity edema in the setting of stasis as patient is essentially bed and wheelchair bound.    Patient has some left chest wall versus breast pain at approximately the T4 level.  Provoked with palpation, patient is not able to describe any palliative factors, though this is limited by encephalopathy and AMS.  Patient reports no nausea or vomiting.  Does have a history of diabetic gastroparesis.    Review of Systems    The 10 point Review of Systems is negative other than noted in the HPI or here.  No cough  No fevers  No chills  Left chest wall pain versus breast pain which is reproducible and patient is not able to describe to be any further.      Past Medical History    I have reviewed this patient's medical history and updated it with pertinent information if needed.   Past Medical History:   Diagnosis Date     Background diabetic retinopathy(362.01)     extensive diabetic retinopathy, s/p multiple laser treatments     Diabetic gastroparesis (H)     followed by [facility] (Dr. Chen)     Diarrhea      Dizziness and giddiness      Hyponatremia 12/16/11    Na 121     Kidney replaced by transplant      Mixed hyperlipidemia      Obesity      Osteopenia 11/07    per DEXA     Other and unspecified hyperlipidemia      Other pulmonary embolism and infarction 3/03    Bilateral pulmonary emboli post op after knee replacement     Polyneuropathy in diabetes(357.2)      Primary  localized osteoarthrosis, lower leg      Pyelonephritis, unspecified      Type 2 diabetes mellitus with complications (H)      Urinary tract infection, site not specified     Chronic UTIs     UTI (urinary tract infection) due to urinary indwelling catheter (H)        Past Surgical History   I have reviewed this patient's surgical history and updated it with pertinent information if needed.  Past Surgical History:   Procedure Laterality Date     C NONSPECIFIC PROCEDURE  10/99    kidney transplant     C NONSPECIFIC PROCEDURE      MRI head, lumbar spine, pelvis     C TOTAL KNEE ARTHROPLASTY  3/03    Knee Replacement, Total right, post op PE     CHOLECYSTECTOMY       HC LEFT HEART CATHETERIZATION      Cardiac Cath, Left Heart, Negative  (@ [facility])     HC LEFT HEART CATHETERIZATION      normal coronary angiogram at [facility], had mild troponin rise (0.71)     HC REMV CATARACT EXTRACAP,INSERT LENS      bilateral cataract repaired, left eye        Social History   I have reviewed this patient's social history and updated it with pertinent information if needed.  Social History     Tobacco Use     Smoking status: Former Smoker     Years: 17.00     Last attempt to quit: 1987     Years since quittin.0     Smokeless tobacco: Never Used   Substance Use Topics     Alcohol use: No     Drug use: No       Family History   I have reviewed this patient's family history and updated it with pertinent information if needed.   Family History   Problem Relation Age of Onset     Diabetes Mother      Parkinsonism Mother      Diabetes Brother      Hypertension Father         [id number] pneumonia     Heart Disease Father        Prior to Admission Medications   Prior to Admission Medications   Prescriptions Last Dose Informant Patient Reported? Taking?   ACETAMINOPHEN PO  Spouse/Significant Other Yes No   Sig: Take 325-650 mg by mouth every 4 hours as needed.   COMFORT EZ PEN NEEDLES 31G X 6 MM miscellaneous   Spouse/Significant Other No No   Sig: USE 4 DAILY OR AS DIRECTED   Cranberry 400 MG TABS  Spouse/Significant Other Yes No   Sig: Take 400 mg by mouth 2 times daily   Lactobacillus Rhamnosus, GG, (CULTURELL) capsule  Spouse/Significant Other No No   Sig: Take 1 capsule by mouth 2 times daily   VITAMIN D, CHOLECALCIFEROL, PO  Spouse/Significant Other Yes No   Sig: Take 4,000 Units by mouth 2 times daily   amLODIPine (NORVASC) 5 MG tablet   No No   Sig: Take 2 tablets (10 mg) by mouth daily   aspirin 81 MG tablet  Spouse/Significant Other Yes No   Sig: Take 81 mg by mouth daily   baclofen (LIORESAL) 10 MG tablet  Spouse/Significant Other No No   Sig: Take 1 tablet (10 mg) by mouth 2 times daily as needed for other (Bladder Spasms)   blood glucose (ACCU-CHEK COMPACT PLUS) test strip  Spouse/Significant Other No No   Sig: USE TO TEST BLOOD SUGAR THREE TIMES A DAY   ciprofloxacin (CIPRO) 500 MG tablet   No No   Sig: Take 1 tablet (500 mg) by mouth 2 times daily for 6 days   cyanocobalamin (VITAMIN B12) 1000 MCG/ML injection  Spouse/Significant Other No No   Sig: Inject 1 mL (1,000 mcg) into the muscle every 30 days   diphenoxylate-atropine (LOMOTIL) 2.5-0.025 MG tablet  Spouse/Significant Other No No   Sig: Take 1-2 tablets by mouth 4 times daily as needed for diarrhea   famotidine (PEPCID) 20 MG tablet  Spouse/Significant Other Yes No   Sig: Take 1 tablet (20 mg) by mouth 2 times daily   gabapentin (NEURONTIN) 300 MG capsule  Spouse/Significant Other No No   Sig: Take 2 capsules (600 mg) by mouth 2 times daily May take an additional 300mg at bedtime daily with increase pain   insulin aspart (NOVOLOG FLEXPEN) 100 UNIT/ML pen   Yes No   Sig: Take 10 units with meals twice daily with sliding scale. Max 40 units per day  Sliding scale:   140-199 2u  200-249 4u  250-299 6u  300-349 8u  350-399 10u  Over 400 12u   insulin glargine (LANTUS SOLOSTAR PEN) 100 UNIT/ML pen   Yes No   Sig: Inject 30 Units Subcutaneous At Bedtime  "  labetalol (NORMODYNE) 300 MG tablet  Spouse/Significant Other Yes No   Sig: Take 0.5 tablets (150 mg) by mouth 2 times daily   levETIRAcetam (KEPPRA) 750 MG tablet   No No   Sig: Take 1 tablet (750 mg) by mouth 2 times daily   levothyroxine (SYNTHROID/LEVOTHROID) 50 MCG tablet  Spouse/Significant Other No No   Sig: TAKE ONE TABLET BY MOUTH EVERY DAY   lidocaine (XYLOCAINE) 2 % topical gel  Spouse/Significant Other No No   Sig: Apply topically as needed for moderate pain 10 ML every 3 weeks   loratadine (CLARITIN) 10 MG tablet  Spouse/Significant Other No No   Sig: Take 1 tablet (10 mg) by mouth daily   menthol-zinc oxide (CALMOSEPTINE) 0.44-20.625 % OINT ointment   Yes No   Sig: Apply topically 4 times daily as needed for skin protection   ondansetron (ZOFRAN ODT) 4 MG ODT tab  Spouse/Significant Other No No   Sig: Take 1 tablet (4 mg) by mouth daily as needed for nausea 30 minutes before getting up in your wheelchair   order for DME  Spouse/Significant Other No No   Sig: Equipment being ordered: [employer] gel-T cushion 20 inches wide & 18 inches deep    height 1.676 m (5' 6\"), weight 101.6 kg (223 lb 15.8 oz)   order for DME  Spouse/Significant Other No No   Sig: Equipment being ordered: Tegaderm 4x4   MICHELLE 6 months  Sacral wound   pravastatin (PRAVACHOL) 10 MG tablet  Spouse/Significant Other No No   Sig: Take 1 tablet (10 mg) by mouth daily   prazosin (MINIPRESS) 1 MG capsule   No No   Sig: Take 1 capsule (1 mg) by mouth At Bedtime   predniSONE (DELTASONE) 5 MG tablet  Spouse/Significant Other No No   Sig: Take 1 tablet (5 mg) by mouth daily   simethicone (MYLICON) 125 MG CHEW  Spouse/Significant Other Yes No   Sig: Take 1 tablet (125 mg) by mouth as needed for intestinal gas   tacrolimus (GENERIC EQUIVALENT) 1 MG capsule  Spouse/Significant Other No No   Sig: Take 2 capsules (2 mg) by mouth 2 times daily      Facility-Administered Medications: None     Allergies   Allergies   Allergen Reactions     " Atorvastatin Calcium      myalgias, muscle aches     Codeine Nausea and Vomiting     Darvocet [Propoxyphene N-Apap] Nausea and Vomiting     Hydromorphone      Levofloxacin Other (See Comments) and Diarrhea     hallucinations     Morphine      Nausea and vomiting     Niaspan [Niacin] GI Disturbance     Flushing even at low dose, GI upset     Percocet [Oxycodone-Acetaminophen]      Vomiting after taking med couple of times     Vicodin [Hydrocodone-Acetaminophen] Nausea and Vomiting       Physical Exam   Vital Signs: Temp: 98.2  F (36.8  C) Temp src: Rectal BP: 183/87 Pulse: 58   Resp: 12 SpO2: 97 % O2 Device: None (Room air)       General Appearance: Fatigued appearing morbidly obese female laying comfortably in bed.  Eyes: No icterus or injection  HEENT: Cushingoid appearance.  Extremely dry oral mucosa  Respiratory: Breath sounds with fair air movement throughout lung fields.  Fair effort.  No wheezes or crackles.  Cardiovascular: Regular rate and rhythm, no appreciable murmur.  Heart sounds distant.  GI: Obese, no tenderness, soft.  I am unable to palpate right lower quadrant transplant kidney in the setting of obesity.  Lymph/Hematologic: Chronic bilateral lower extremity lymphedema in the setting of morbid obesity as well as significant pedal edema in the setting of stasis.  Genitourinary: Johnson catheter in place draining yellow urine with significant sediment.  Skin: No sacral  coccygeal or sacral ulceration appreciated.  Patient does have hammertoe with first toe ulceration on left and overlying callus sacral or coccygeal pressure injury noted  Musculoskeletal: Muscular wasting, most evident in bilateral lower extremities.  Neurologic: Patient is sleepy, though she opens her eyes and responds with only auditory stimulation.  She is able to answer questions in a limited fashion in 1-2 word phrases.   Psychiatric: Flat/blunted affect, though difficult to adequately assess in the setting of  AMS/encephalopathy    Data   Data reviewed today: I reviewed all medications, new labs and imaging results over the last 24 hours. I personally reviewed no images or EKG's today.    Recent Labs   Lab 06/02/19 2028   WBC 7.0   HGB 10.8*   MCV 93         POTASSIUM 4.9   CHLORIDE 109   CO2 27   BUN 45*   CR 1.84*   ANIONGAP 6   ROGER 8.9   *   ALBUMIN 3.2*   PROTTOTAL 8.2   BILITOTAL 0.3   ALKPHOS 117   ALT 28   AST 17   TROPI <0.015

## 2019-06-03 NOTE — ED NOTES
RECEIVING UNIT ED HANDOFF REVIEW    ED Nurse Handoff Report was reviewed by: Rhonda Sher on La 3, 2019 at 12:44 AM

## 2019-06-04 LAB
ALBUMIN SERPL-MCNC: 2.9 G/DL (ref 3.4–5)
ALP SERPL-CCNC: 113 U/L (ref 40–150)
ALT SERPL W P-5'-P-CCNC: 21 U/L (ref 0–50)
ANION GAP SERPL CALCULATED.3IONS-SCNC: 8 MMOL/L (ref 3–14)
AST SERPL W P-5'-P-CCNC: 14 U/L (ref 0–45)
BILIRUB SERPL-MCNC: 0.3 MG/DL (ref 0.2–1.3)
BUN SERPL-MCNC: 34 MG/DL (ref 7–30)
CALCIUM SERPL-MCNC: 8.6 MG/DL (ref 8.5–10.1)
CHLORIDE SERPL-SCNC: 115 MMOL/L (ref 94–109)
CO2 SERPL-SCNC: 21 MMOL/L (ref 20–32)
CREAT SERPL-MCNC: 1.57 MG/DL (ref 0.52–1.04)
ERYTHROCYTE [DISTWIDTH] IN BLOOD BY AUTOMATED COUNT: 14.4 % (ref 10–15)
GFR SERPL CREATININE-BSD FRML MDRD: 32 ML/MIN/{1.73_M2}
GLUCOSE BLDC GLUCOMTR-MCNC: 104 MG/DL (ref 70–99)
GLUCOSE BLDC GLUCOMTR-MCNC: 126 MG/DL (ref 70–99)
GLUCOSE BLDC GLUCOMTR-MCNC: 126 MG/DL (ref 70–99)
GLUCOSE SERPL-MCNC: 118 MG/DL (ref 70–99)
HCT VFR BLD AUTO: 33.2 % (ref 35–47)
HGB BLD-MCNC: 10.4 G/DL (ref 11.7–15.7)
LEVETIRACETAM SERPL-MCNC: 33 UG/ML (ref 12–46)
MCH RBC QN AUTO: 29.3 PG (ref 26.5–33)
MCHC RBC AUTO-ENTMCNC: 31.3 G/DL (ref 31.5–36.5)
MCV RBC AUTO: 94 FL (ref 78–100)
PLATELET # BLD AUTO: 154 10E9/L (ref 150–450)
POTASSIUM SERPL-SCNC: 4.3 MMOL/L (ref 3.4–5.3)
PROT SERPL-MCNC: 7.3 G/DL (ref 6.8–8.8)
RBC # BLD AUTO: 3.55 10E12/L (ref 3.8–5.2)
SODIUM SERPL-SCNC: 144 MMOL/L (ref 133–144)
WBC # BLD AUTO: 6.1 10E9/L (ref 4–11)

## 2019-06-04 PROCEDURE — 99233 SBSQ HOSP IP/OBS HIGH 50: CPT | Performed by: INTERNAL MEDICINE

## 2019-06-04 PROCEDURE — 25000128 H RX IP 250 OP 636: Performed by: INTERNAL MEDICINE

## 2019-06-04 PROCEDURE — 36415 COLL VENOUS BLD VENIPUNCTURE: CPT | Performed by: INTERNAL MEDICINE

## 2019-06-04 PROCEDURE — 25000132 ZZH RX MED GY IP 250 OP 250 PS 637: Performed by: INTERNAL MEDICINE

## 2019-06-04 PROCEDURE — 87040 BLOOD CULTURE FOR BACTERIA: CPT | Performed by: INTERNAL MEDICINE

## 2019-06-04 PROCEDURE — 00000146 ZZHCL STATISTIC GLUCOSE BY METER IP

## 2019-06-04 PROCEDURE — 25000131 ZZH RX MED GY IP 250 OP 636 PS 637: Performed by: INTERNAL MEDICINE

## 2019-06-04 PROCEDURE — 40000893 ZZH STATISTIC PT IP EVAL DEFER

## 2019-06-04 PROCEDURE — 25800030 ZZH RX IP 258 OP 636: Performed by: INTERNAL MEDICINE

## 2019-06-04 PROCEDURE — 99207 ZZC NON-BILLABLE SERV PER CHARTING: CPT | Performed by: INTERNAL MEDICINE

## 2019-06-04 PROCEDURE — 12000000 ZZH R&B MED SURG/OB

## 2019-06-04 PROCEDURE — 80053 COMPREHEN METABOLIC PANEL: CPT | Performed by: INTERNAL MEDICINE

## 2019-06-04 PROCEDURE — A9270 NON-COVERED ITEM OR SERVICE: HCPCS | Performed by: INTERNAL MEDICINE

## 2019-06-04 PROCEDURE — 85027 COMPLETE CBC AUTOMATED: CPT | Performed by: INTERNAL MEDICINE

## 2019-06-04 RX ORDER — HALOPERIDOL 5 MG/ML
2 INJECTION INTRAMUSCULAR ONCE
Status: COMPLETED | OUTPATIENT
Start: 2019-06-04 | End: 2019-06-04

## 2019-06-04 RX ADMIN — AMLODIPINE BESYLATE 10 MG: 10 TABLET ORAL at 16:35

## 2019-06-04 RX ADMIN — HYDRALAZINE HYDROCHLORIDE 10 MG: 20 INJECTION INTRAMUSCULAR; INTRAVENOUS at 01:48

## 2019-06-04 RX ADMIN — ERTAPENEM SODIUM 1 G: 1 INJECTION, POWDER, LYOPHILIZED, FOR SOLUTION INTRAMUSCULAR; INTRAVENOUS at 21:56

## 2019-06-04 RX ADMIN — TACROLIMUS 2 MG: 1 CAPSULE ORAL at 16:32

## 2019-06-04 RX ADMIN — LEVETIRACETAM 500 MG: 500 TABLET, FILM COATED ORAL at 16:33

## 2019-06-04 RX ADMIN — HALOPERIDOL LACTATE 2 MG: 5 INJECTION INTRAMUSCULAR at 21:56

## 2019-06-04 RX ADMIN — PREDNISONE 5 MG: 5 TABLET ORAL at 16:34

## 2019-06-04 RX ADMIN — LEVOTHYROXINE SODIUM 50 MCG: 50 TABLET ORAL at 16:33

## 2019-06-04 RX ADMIN — SODIUM CHLORIDE: 9 INJECTION, SOLUTION INTRAVENOUS at 13:06

## 2019-06-04 RX ADMIN — SODIUM CHLORIDE: 9 INJECTION, SOLUTION INTRAVENOUS at 21:56

## 2019-06-04 RX ADMIN — HEPARIN SODIUM 5000 UNITS: 5000 INJECTION, SOLUTION INTRAVENOUS; SUBCUTANEOUS at 01:56

## 2019-06-04 RX ADMIN — ASPIRIN 81 MG: 81 TABLET, COATED ORAL at 16:35

## 2019-06-04 RX ADMIN — FAMOTIDINE 20 MG: 20 TABLET ORAL at 16:34

## 2019-06-04 RX ADMIN — Medication 50 MG: at 16:33

## 2019-06-04 RX ADMIN — SODIUM CHLORIDE: 9 INJECTION, SOLUTION INTRAVENOUS at 01:58

## 2019-06-04 RX ADMIN — HYDRALAZINE HYDROCHLORIDE 10 MG: 20 INJECTION INTRAMUSCULAR; INTRAVENOUS at 10:18

## 2019-06-04 ASSESSMENT — ACTIVITIES OF DAILY LIVING (ADL)
ADLS_ACUITY_SCORE: 35

## 2019-06-04 NOTE — PROGRESS NOTES
[facility]  Patient is currently open to home care services with [place]. The patient is currently receiving Skilled Nursing and HA services. [facility]  and team have been notified of patient admission. [facility] liaison will continue to follow patient during stay. If appropriate provide orders to resume home care at time of discharge.

## 2019-06-04 NOTE — PLAN OF CARE
"VSS on RA ex hypertensive this AM. Pt's orientation fluctuated. 0800 oriented x2-3, knew location and birthday. 0930 pt disoriented x3 and refused almost all cares throughout shift. 3 RNs including writer multiple times attempted to give oral medications but pt refused. MD aware. Given IV antihypertensive for systolic BP of 170s- unable to do BP reheck. BG checks unable to be done- no oral intake on shift. Johnson adequate UOP. PIV infusing. Skin assessment and most repositioning q2h was refused.  called and stated, \"this is normal. I'm glad she is not belligerent.\" Pt paranoid and appears to be hallucinating at times. Report given to oncoming nurse to page MD if unable to get high priority medications in orally.  "

## 2019-06-04 NOTE — PROGRESS NOTES
[facility]    Medicine Progress Note - Hospitalist Service        Date of Admission:  6/2/2019  8:13 PM    Assessment & Plan:   Gem Jade is a 73 year old female admitted on 6/2/2019. She presents to the emergency department with decreased level of alertness consistent with prior episodes of encephalopathy with urinary tract infections.     Acute encephalopathy, secondary to catheter related urinary tract infection  History of chronic indwelling Johnson  -History of frequent urinary tract infections associated with chronic indwelling Johnson catheter.  Most recent urine cultures 4/24/2019 and 5/10/2019 demonstrated greater than 100,000 colony-forming units of Citrobacter.  Has a history of VRE, MRSA, and ESBL.  Infectious disease was contacted in the emergency department by Dr. Cline and recommended ertapenem as well as a urine Gram stain to help with determining patient's antibiotic tailoring.  Note that patient has followed with infectious disease clinic in the past related to recurrent urinary tract infections with colonization.  Patient on multiple sedating medications including baclofen, gabapentin.   -Continue indwelling Johnson catheter for retention; Catheter exchanged in the emergency department  -Urine culture growing gram-negative rods  -Continue Invanz for now.  -Blood culture positive for gram-positive cocci in clusters, likely contaminant, repeat blood culture x1 this morning, continue Invanz alone for now.  -Resume prior to admission gabapentin  -Keppra level pending     Obstructive sleep apnea on CPAP:   -Mild elevation in PCO2 on VBG in the emergency department, though does not seem to be primary cause of patient's somnolence.  -CPAP as per home settings     Possible left lower lobe pneumonia: Patient with no cough, no fevers, no shaking chills.  With patient's degree of somnolence, more likely represents atelectasis.  Patient does have encephalopathy, though this is common for  her in the setting of volume depletion and urinary tract infections.  -Procalcitonin added on  -Continuing ertapenem as above, though this is primarily for treatment of pyuria and suspected urinary tract infection.  Low suspicion for pneumonia currently.     Type 2 diabetes with retinopathy, nephropathy, neuropathy including diabetic gastroparesis: Prior to admission dosing of Lantus appears to be 30 units at bedtime, NovoLog 10 units 3 times daily with meals and sliding scale insulin.  Last hemoglobin A1c of 6.2 2019.  -Hemoglobin A1c pending  -Resume prior to admission Lantus at 15 units at bedtime(PTA on 30 units at bedtime)  -Medium dose sliding scale insulin as well as 1 unit per 20 g carbohydrate prandial insulin.  -Continue prior to admission aspirin 81 mg daily     End-stage renal disease status post historic right lower quadrant renal transplant:   -Renal transplant in .  Uncertain if this was living or  donor.  -Continue prednisone 5 mg daily at this time;   -Continue tacrolimus  -Renal function currently appears to be around baseline     Severe obesity: Increased risk of all cause mortality  -Daily weights, intake and output  -Weight loss as able.  Patient limited from a mobility standpoint given she is wheelchair/bedbound by history.  -Physical therapy consulted for mobilization to prevent pressure injuries while hospitalized     History of provoked bilateral PE:   -not on anticoags  -Subcutaneous heparin for DVT prophylaxis     Paroxysmal atrial fibrillation: Note 2016 EKG demonstrating A. fib with RVR in the setting of neurovirus with diarrhea.  Underwent 48-hour Holter monitoring in May 2016 demonstrating sinus rhythm with occasional supraventricular tachycardia.  No atrial fibrillation reported on that study.  Currently in sinus rhythm.  At time of event in , recognition was for discussion with primary care provider regarding risk versus benefit of anticoagulation.  I do not  believe patient has had any recurrent events of atrial fibrillation since, though all EKGs were not reviewed since this timeframe.  [id number] score of 3 for age, female sex, diabetes.  -Defer further discussion of anticoagulation to primary care provider.       History of seizure disorder:   -Keppra level pending, encephalopathy however improving, prior to admission Keppra resumed.     Hypertension:  -Continue prior to admission amlodipine 10 milligrams daily  -As needed labetalol available for persistent hypertension greater than 180     Cystic finding in the pancreatic body.  Incidental on extensive imaging in the emergency department.  Has been noted previously as well.  Question of intraductal papillary mucinous neoplasm.  Patient follows with Dr. Chen of gastroenterology, largely for her gastroparesis.  -We will defer any further follow-up to outpatient gastroenterology.     Hypothyroidism:  -Continue prior to admission levothyroxine 50 mcg daily once verified by the pharmacy  -TSH within normal limits.    Diet: Dysphagia Diet Level 1 Pureed Thin Liquids (water, ice chips, juice, milk gelatin, ice cream, etc)     DVT Prophylaxis: Pneumatic Compression Devices   Johnson Catheter: in place, indication: Retention  Code Status: Full Code     Disposition Plan    Expected discharge: 2 - 3 days, recommended to TBD  Entered: Catrachito Robert MD 06/04/2019, 7:58 AM        The patient's care was discussed with the Bedside Nurse and Patient.    Catrachito Robert MD  Hospitalist Service  [facility]    ______________________________________________________________________    Interval History   Encephalopathy much improved.  Now conversing and interacting.  Afebrile.  Patient endorses of back pain(chronic).  Denies nausea or vomiting.    Data reviewed today: I reviewed all medications, new labs and imaging results over the last 24 hours. I personally reviewed no images or EKG's today.    Physical Exam  "  Vital signs:  Temp: 99.1  F (37.3  C) Temp src: Oral BP: 143/51   Heart Rate: 74 Resp: 16 SpO2: 93 % O2 Device: None (Room air)        Estimated body mass index is 35.1 kg/m  as calculated from the following:    Height as of 2/28/19: 1.753 m (5' 9\").    Weight as of 4/27/19: 107.8 kg (237 lb 10.5 oz).      Wt Readings from Last 2 Encounters:   04/27/19 107.8 kg (237 lb 10.5 oz)   03/03/19 113 kg (249 lb 1.9 oz)       Gen: Awake, alert, oriented x2.  HEENT:  no pallor  Resp: CTA B/L, normal WOB, no crackles, no wheezes  CVS: RRR, no murmur  Abd/GI: Soft, non-tender. BS- normoactive.    Skin: Warm, dry no rashes  MSK: No joint deformities, no pedal edema  Neuro- No focal deficits; appears to follow commands appropriately.    Data   Recent Labs   Lab 06/03/19  0757 06/02/19 2028   WBC  --  7.0   HGB  --  10.8*   MCV  --  93    177   NA  --  142   POTASSIUM  --  4.9   CHLORIDE  --  109   CO2  --  27   BUN  --  45*   CR  --  1.84*   ANIONGAP  --  6   ROGER  --  8.9   GLC  --  112*   ALBUMIN  --  3.2*   PROTTOTAL  --  8.2   BILITOTAL  --  0.3   ALKPHOS  --  117   ALT  --  28   AST  --  17   TROPI  --  <0.015       No results found for this or any previous visit (from the past 24 hour(s)).  Medications     sodium chloride 100 mL/hr at 06/04/19 0158       amLODIPine  10 mg Oral Daily     aspirin  81 mg Oral Daily     ertapenem (INVanz) IV  1 g Intravenous Q24H     famotidine  20 mg Oral BID     heparin  5,000 Units Subcutaneous Q12H     insulin aspart  1-7 Units Subcutaneous TID AC     insulin aspart  1-5 Units Subcutaneous At Bedtime     insulin aspart   Subcutaneous TID w/meals     labetalol  150 mg Oral BID     levETIRAcetam  750 mg Oral BID     levothyroxine  50 mcg Oral Daily     pravastatin  10 mg Oral Daily     prazosin  1 mg Oral At Bedtime     predniSONE  5 mg Oral Daily     sodium chloride (PF)  3 mL Intracatheter Q8H     tacrolimus  2 mg Oral BID         "

## 2019-06-04 NOTE — PLAN OF CARE
Pt Very lethargic.  Bedrest.  Pureed diet.  Full code.  Alex patent.  BS 99, 104.  Contact precautions maintained.

## 2019-06-04 NOTE — PROVIDER NOTIFICATION
MD Notification    Notified Person: MD    Notified Person Name: raul    Notification Date/Time: 6/4 1540    Notification Interaction: text page    Purpose of Notification: pt refused all AM and afternoon medications, do you want anything ordered IV? Will continue to encourage important PO meds    Orders Received:    Comments:

## 2019-06-04 NOTE — PROGRESS NOTES
Progress Note    Called about 1/2 BCx + for GPC    Ucx pending    Patient on invanz and labs reviewed    Per nursing patient still lethargic, no fever.    At this point, continue invanz and await ID and sensitivities, as this maybe a contaminant.    Will watch for any clinical deterioration and if so with fever, elevated wbc, can consider vancomcyin.  I anticipate the ID and sensitivity will come back soon.    Also noted elevated bp and patient unable to take po, will order hydralazine.      Terence Austin MD

## 2019-06-04 NOTE — PLAN OF CARE
SLP: Attempted to see patient for swallow treatment, but she was hallucinating about an alligator in the room and refused to eat or drink anything. Session canceled and will reschedule for 6/5/19.

## 2019-06-04 NOTE — PLAN OF CARE
Discharge Planner PT   Patient plan for discharge: return to prior living  Current status: Eval orders received, chart reviewed. Per chart pt bedbound at baseline, orders for weakness/deconditioning, MD note states PT orders for preventing pressure sores.     Checked in with pt, pt continues to be somewhat confused however more alert. Pt able to roll L and R in bed to unweight self. Pt reports she has PCA assist at home for bathing and ADLs, bedbound. Pt currently at baseline for functional mobility, no skilled PT indicated.     Barriers to return to prior living situation: defer to MD team  Recommendations for discharge: Defer to MD team   Rationale for recommendations: Defer to MD team. No PT needs. Pt is baseline. PT to complete orders. Nursing to address repositioning to prevent pressure sores during hospital stay          Entered by: Anna Leyva 06/04/2019 8:24 AM

## 2019-06-04 NOTE — PROVIDER NOTIFICATION
MD Notification    Notified Person: MD    Notified Person Name: MD Geovanna    Notification Date/Time: 6/3/19 2200    Notification Interaction: talked on phone    Purpose of Notification: Paged regarding gram + blood cultures and pt is unable to take scheduled BP med, requesting possible IV    Orders Received: No change to antibiotics, will add IV BP med    Comments:

## 2019-06-05 ENCOUNTER — APPOINTMENT (OUTPATIENT)
Dept: SPEECH THERAPY | Facility: CLINIC | Age: 74
DRG: 699 | End: 2019-06-05
Payer: MEDICARE

## 2019-06-05 LAB
BACTERIA SPEC CULT: ABNORMAL
GABAPENTIN SERPLBLD-MCNC: 34.2 UG/ML (ref 4–16)
GLUCOSE BLDC GLUCOMTR-MCNC: 147 MG/DL (ref 70–99)
GLUCOSE BLDC GLUCOMTR-MCNC: 163 MG/DL (ref 70–99)
GLUCOSE BLDC GLUCOMTR-MCNC: 169 MG/DL (ref 70–99)
GLUCOSE BLDC GLUCOMTR-MCNC: 186 MG/DL (ref 70–99)
GLUCOSE BLDC GLUCOMTR-MCNC: 228 MG/DL (ref 70–99)
Lab: ABNORMAL
SPECIMEN SOURCE: ABNORMAL
SPECIMEN SOURCE: ABNORMAL

## 2019-06-05 PROCEDURE — A9270 NON-COVERED ITEM OR SERVICE: HCPCS | Mod: GY | Performed by: INTERNAL MEDICINE

## 2019-06-05 PROCEDURE — 99232 SBSQ HOSP IP/OBS MODERATE 35: CPT | Performed by: INTERNAL MEDICINE

## 2019-06-05 PROCEDURE — 25000128 H RX IP 250 OP 636: Performed by: INTERNAL MEDICINE

## 2019-06-05 PROCEDURE — A9270 NON-COVERED ITEM OR SERVICE: HCPCS | Performed by: INTERNAL MEDICINE

## 2019-06-05 PROCEDURE — 92526 ORAL FUNCTION THERAPY: CPT | Mod: GN | Performed by: SPEECH-LANGUAGE PATHOLOGIST

## 2019-06-05 PROCEDURE — 25000132 ZZH RX MED GY IP 250 OP 250 PS 637: Mod: GY | Performed by: INTERNAL MEDICINE

## 2019-06-05 PROCEDURE — 12000000 ZZH R&B MED SURG/OB

## 2019-06-05 PROCEDURE — 00000146 ZZHCL STATISTIC GLUCOSE BY METER IP

## 2019-06-05 PROCEDURE — 25000131 ZZH RX MED GY IP 250 OP 636 PS 637: Mod: GY | Performed by: INTERNAL MEDICINE

## 2019-06-05 PROCEDURE — 25000132 ZZH RX MED GY IP 250 OP 250 PS 637: Performed by: INTERNAL MEDICINE

## 2019-06-05 RX ORDER — GABAPENTIN 300 MG/1
600 CAPSULE ORAL 2 TIMES DAILY
Status: DISCONTINUED | OUTPATIENT
Start: 2019-06-05 | End: 2019-06-07 | Stop reason: HOSPADM

## 2019-06-05 RX ORDER — FAMOTIDINE 20 MG/1
20 TABLET, FILM COATED ORAL DAILY
Status: DISCONTINUED | OUTPATIENT
Start: 2019-06-06 | End: 2019-06-07 | Stop reason: HOSPADM

## 2019-06-05 RX ADMIN — Medication 150 MG: at 20:41

## 2019-06-05 RX ADMIN — GABAPENTIN 600 MG: 300 CAPSULE ORAL at 10:45

## 2019-06-05 RX ADMIN — PREDNISONE 5 MG: 5 TABLET ORAL at 08:21

## 2019-06-05 RX ADMIN — INSULIN GLARGINE 15 UNITS: 100 INJECTION, SOLUTION SUBCUTANEOUS at 20:44

## 2019-06-05 RX ADMIN — AMLODIPINE BESYLATE 10 MG: 10 TABLET ORAL at 08:19

## 2019-06-05 RX ADMIN — ERTAPENEM SODIUM 1 G: 1 INJECTION, POWDER, LYOPHILIZED, FOR SOLUTION INTRAMUSCULAR; INTRAVENOUS at 21:03

## 2019-06-05 RX ADMIN — INSULIN ASPART 1 UNITS: 100 INJECTION, SOLUTION INTRAVENOUS; SUBCUTANEOUS at 18:33

## 2019-06-05 RX ADMIN — HYDRALAZINE HYDROCHLORIDE 10 MG: 20 INJECTION INTRAMUSCULAR; INTRAVENOUS at 10:45

## 2019-06-05 RX ADMIN — LEVETIRACETAM 750 MG: 500 TABLET, FILM COATED ORAL at 20:42

## 2019-06-05 RX ADMIN — TACROLIMUS 2 MG: 1 CAPSULE ORAL at 06:52

## 2019-06-05 RX ADMIN — GABAPENTIN 600 MG: 300 CAPSULE ORAL at 20:41

## 2019-06-05 RX ADMIN — PRAVASTATIN SODIUM 10 MG: 10 TABLET ORAL at 20:42

## 2019-06-05 RX ADMIN — LEVETIRACETAM 750 MG: 500 TABLET, FILM COATED ORAL at 08:21

## 2019-06-05 RX ADMIN — FAMOTIDINE 20 MG: 20 TABLET ORAL at 08:21

## 2019-06-05 RX ADMIN — Medication 150 MG: at 08:20

## 2019-06-05 RX ADMIN — HEPARIN SODIUM 5000 UNITS: 5000 INJECTION, SOLUTION INTRAVENOUS; SUBCUTANEOUS at 01:33

## 2019-06-05 RX ADMIN — LEVOTHYROXINE SODIUM 50 MCG: 50 TABLET ORAL at 08:20

## 2019-06-05 RX ADMIN — TACROLIMUS 2 MG: 1 CAPSULE ORAL at 20:40

## 2019-06-05 RX ADMIN — ASPIRIN 81 MG: 81 TABLET, COATED ORAL at 08:22

## 2019-06-05 ASSESSMENT — ACTIVITIES OF DAILY LIVING (ADL)
ADLS_ACUITY_SCORE: 35
ADLS_ACUITY_SCORE: 31
ADLS_ACUITY_SCORE: 35
ADLS_ACUITY_SCORE: 35

## 2019-06-05 NOTE — PLAN OF CARE
Pt disoriented to situation at times. VSS on RA. CPAP overnight. Denied pain. Johnson patent w/ adequate output. Pureed diet, thin liquids. R PIV infusing NS @ 100 ml/hr. Blanchable redness on bottom. Turn/repo q2hr. Sitter at bedside.

## 2019-06-05 NOTE — PLAN OF CARE
A&O x4 but still does not make sense with wording at times. Reports of seeing things in the room, per  this is normal for her at first with a UTI. Had refused all AM meds but writer was able to get her to take them-pt still refused vital signs but allowed reposition and BG checks. BG checks before meals, last 126 and no coverage needed. Chronic rico catheter in place, adequate UOP. Pureed diet with thin liquids. Ax2, turn repo q2h. Keshawn updated and said if refusing meds to call him at bedside to talk to him, number given to RN taking over shift. Will continue to monitor.

## 2019-06-05 NOTE — PLAN OF CARE
A&O x4 but still reporting hallucinations of alligators- aware that she is hallucinating and other cannot see what she sees. Paranoid but calm and cooperative and not being combative. VSS ex hypertensive, PRN hydralazine given after scheduled BP meds d/t SBP of 171. Denies pain. Ax2 with lift, turning q2h. Blanchable redness on bottom, mepilex applied. Chronic rico catheter, adequate UOP. Fluids DC'd and x2 PIV SL. Incontinent of BM x1 today, refuses bedpan and does not call nursing when incontinent so checking and cleaning with every turn. DD2, poor appetite and has refused eating breakfast and lunch today. BG checks before meals.  Keshawn updated. Will continue to monitor.

## 2019-06-05 NOTE — PROVIDER NOTIFICATION
MD Notification    Notified Person: MD    Notified Person Name: Dr. Low    Notification Date/Time: 06/04/19 @ 2110    Notification Interaction: Phone Call    Purpose of Notification: Paranoid and Hallucinating    Orders Received: 2mg IV Haldol one time

## 2019-06-05 NOTE — PLAN OF CARE
Patient is disoriented to situation. Patient is paranoid, suspicious, and hallucinating, gave IV Haldol 2mg x1, effective. Patient is refusing vital signs and formal assessment. R PIV infusing NS at 100 ml/hr and L PIV SL. On Puree diet with thin liquids. Skin is dry and flaky, blanchable redness on coccyx. Patient should be repositioned every 2 hours, occasionally refuses. Interdry between skin folds. CPAP on at night.

## 2019-06-05 NOTE — PLAN OF CARE
Discharge Planner SLP   Patient plan for discharge: She wants to go home.   Current status: Patient was seen for swallow treatment at bedside. She was not confused as compared to yesterday. She was willing to trial a grahm cracker for diet advancement. She took 4oz of water via the cup without overt Sx of aspiration. Mastication was mildly prolonged with the solid, but had good bolus extraction and clearing. No Sx of aspiration on limited trial. Recommend: 1. Advance diet to DDL 2 with thin liquids. 2. Upright, no straws, small bites/sips, and alternate liquids/solids as needed. 3. SLP will continue to follow for diet advancement.   Barriers to return to prior living situation: None per speech perspective.   Recommendations for discharge: Home with assistance  Rationale for recommendations: Patient may need short term ST needs at discharge if not back to her baseline diet.        Entered by: Pily Boyce 06/05/2019 12:06 PM

## 2019-06-05 NOTE — PROGRESS NOTES
[facility]    Medicine Progress Note - Hospitalist Service        Date of Admission:  6/2/2019  8:13 PM    Assessment & Plan:   Gem Jade is a 73 year old female admitted on 6/2/2019. She presents to the emergency department with decreased level of alertness consistent with prior episodes of encephalopathy with urinary tract infections.     Acute encephalopathy, secondary to catheter related urinary tract infection  Chronic indwelling Johnson  -History of frequent urinary tract infections associated with chronic indwelling Johnson catheter.  Most recent urine cultures 4/24/2019 and 5/10/2019 demonstrated greater than 100,000 colony-forming units of Citrobacter.  Has a history of VRE, MRSA, and ESBL.  Infectious disease was contacted in the emergency department by Dr. Cline and recommended ertapenem as well as a urine Gram stain to help with determining patient's antibiotic tailoring.  Note that patient has followed with infectious disease clinic in the past related to recurrent urinary tract infections with colonization.  Patient on multiple sedating medications including baclofen, gabapentin.   -Continue indwelling Johnson catheter for retention; Catheter exchanged in the emergency department  -Urine culture growing Citrobacter and staph aureus; await final I/D and sens  -Blood culture positive for staph capitis, more than likely a contaminant, repeat blood culture negative.  -Continue Invanz for now.  -Continue prior to admission gabapentin    Obstructive sleep apnea on CPAP:   -Mild elevation in PCO2 on VBG in the emergency department, though does not seem to be primary cause of patient's somnolence.  -CPAP as per home settings     Possible left lower lobe pneumonia: Patient with no cough, no fevers, no shaking chills.  With patient's degree of somnolence, more likely represents atelectasis.  Patient does have encephalopathy, though this is common for her in the setting of volume depletion  and urinary tract infections.  -Procalcitonin added on  -Continuing ertapenem as above, though this is primarily for treatment of pyuria and suspected urinary tract infection.  Low suspicion for pneumonia currently.     Type 2 diabetes with retinopathy, nephropathy, neuropathy including diabetic gastroparesis: Prior to admission dosing of Lantus appears to be 30 units at bedtime, NovoLog 10 units 3 times daily with meals and sliding scale insulin.  Last hemoglobin A1c of 6.2 2019.  -Continue prior to admission Lantus at 15 units at bedtime(PTA on 30 units at bedtime)  -Medium dose sliding scale insulin as well as 1 unit per 20 g carbohydrate prandial insulin.  -Continue prior to admission aspirin 81 mg daily     End-stage renal disease status post historic right lower quadrant renal transplant:   -Renal transplant in .  Uncertain if this was living or  donor.  -Continue prednisone 5 mg daily at this time;   -Continue tacrolimus  -Renal function currently appears to be around baseline     Severe obesity: Increased risk of all cause mortality  -Daily weights, intake and output  -Weight loss as able.  Patient limited from a mobility standpoint given she is wheelchair/bedbound by history.  -Physical therapy consulted for mobilization to prevent pressure injuries while hospitalized     History of provoked bilateral PE:   -not on anticoags  -Subcutaneous heparin for DVT prophylaxis     Paroxysmal atrial fibrillation: Note 2016 EKG demonstrating A. fib with RVR in the setting of neurovirus with diarrhea.  Underwent 48-hour Holter monitoring in May 2016 demonstrating sinus rhythm with occasional supraventricular tachycardia.  No atrial fibrillation reported on that study.  Currently in sinus rhythm.  At time of event in 2016, recognition was for discussion with primary care provider regarding risk versus benefit of anticoagulation.  I do not believe patient has had any recurrent events of atrial  fibrillation since, though all EKGs were not reviewed since this timeframe.  [id number] score of 3 for age, female sex, diabetes.  -Defer further discussion of anticoagulation to primary care provider.       History of seizure disorder:   -Keppra level therapeutic, encephalopathy much improved.  -Continue PTA keppra     Hypertension:  -Continue prior to admission amlodipine 10 milligrams daily  -As needed labetalol available for persistent hypertension greater than 180     Cystic finding in the pancreatic body.  Incidental on extensive imaging in the emergency department.  Has been noted previously as well.  Question of intraductal papillary mucinous neoplasm.  Patient follows with Dr. Chen of gastroenterology, largely for her gastroparesis.  -Defer any further follow-up to outpatient gastroenterology.     Hypothyroidism:  -Continue prior to admission levothyroxine 50 mcg daily once verified by the pharmacy  -TSH within normal limits.    Diet: Dysphagia Diet Level 1 Pureed Thin Liquids (water, ice chips, juice, milk gelatin, ice cream, etc)     DVT Prophylaxis: Pneumatic Compression Devices   Johnson Catheter: in place, indication: Retention  Code Status: Full Code     Disposition Plan    Expected discharge: tomorrow to prior living arrangement.  Entered: Catrachito Robert MD 06/05/2019, 8:36 AM        The patient's care was discussed with the Bedside Nurse and Patient.    Catrachito Robert MD  Hospitalist Service  [facility]    ______________________________________________________________________    Interval History   Encephalopathy much improved. Probably back to baseline.  Now conversing and interacting appropriately.  Afebrile. Reportedly had hallucinations last night; none now     Data reviewed today: I reviewed all medications, new labs and imaging results over the last 24 hours. I personally reviewed no images or EKG's today.    Physical Exam   Vital signs:  Temp: 97.9  F (36.6  C) Temp src:  "Oral BP: 182/76 Pulse: 84   Resp: 18 SpO2: 94 % O2 Device: None (Room air)        Estimated body mass index is 35.1 kg/m  as calculated from the following:    Height as of 2/28/19: 1.753 m (5' 9\").    Weight as of 4/27/19: 107.8 kg (237 lb 10.5 oz).      Wt Readings from Last 2 Encounters:   04/27/19 107.8 kg (237 lb 10.5 oz)   03/03/19 113 kg (249 lb 1.9 oz)       Gen: Awake, alert, oriented x3, no hallucination at the moment  HEENT:  no pallor  Resp: CTA B/L, normal WOB  CVS: RRR, no murmur  Abd/GI: Soft, non-tender. BS- normoactive.    Skin: Warm, dry no rashes  MSK: No joint deformities, trace pedal edema  Neuro- No focal deficits;  follows commands appropriately.    Data   Recent Labs   Lab 06/04/19  0739 06/03/19  0757 06/02/19 2028   WBC 6.1  --  7.0   HGB 10.4*  --  10.8*   MCV 94  --  93    159 177     --  142   POTASSIUM 4.3  --  4.9   CHLORIDE 115*  --  109   CO2 21  --  27   BUN 34*  --  45*   CR 1.57*  --  1.84*   ANIONGAP 8  --  6   ROGER 8.6  --  8.9   *  --  112*   ALBUMIN 2.9*  --  3.2*   PROTTOTAL 7.3  --  8.2   BILITOTAL 0.3  --  0.3   ALKPHOS 113  --  117   ALT 21  --  28   AST 14  --  17   TROPI  --   --  <0.015       No results found for this or any previous visit (from the past 24 hour(s)).  Medications     sodium chloride 100 mL/hr at 06/04/19 2156       amLODIPine  10 mg Oral Daily     aspirin  81 mg Oral Daily     ertapenem (INVanz) IV  1 g Intravenous Q24H     famotidine  20 mg Oral BID     heparin  5,000 Units Subcutaneous Q12H     insulin aspart  1-7 Units Subcutaneous TID AC     insulin aspart  1-5 Units Subcutaneous At Bedtime     insulin aspart   Subcutaneous TID w/meals     insulin glargine  15 Units Subcutaneous At Bedtime     labetalol  150 mg Oral BID     levETIRAcetam  750 mg Oral BID     levothyroxine  50 mcg Oral Daily     pravastatin  10 mg Oral Daily     prazosin  1 mg Oral At Bedtime     predniSONE  5 mg Oral Daily     sodium chloride (PF)  3 mL " Intracatheter Q8H     tacrolimus  2 mg Oral BID

## 2019-06-06 ENCOUNTER — PATIENT OUTREACH (OUTPATIENT)
Dept: CARE COORDINATION | Facility: CLINIC | Age: 74
End: 2019-06-06

## 2019-06-06 DIAGNOSIS — G93.41 METABOLIC ENCEPHALOPATHY: Primary | ICD-10-CM

## 2019-06-06 LAB
GLUCOSE BLDC GLUCOMTR-MCNC: 136 MG/DL (ref 70–99)
GLUCOSE BLDC GLUCOMTR-MCNC: 140 MG/DL (ref 70–99)
GLUCOSE BLDC GLUCOMTR-MCNC: 182 MG/DL (ref 70–99)
GLUCOSE BLDC GLUCOMTR-MCNC: 196 MG/DL (ref 70–99)
GLUCOSE BLDC GLUCOMTR-MCNC: 197 MG/DL (ref 70–99)
GLUCOSE BLDC GLUCOMTR-MCNC: 204 MG/DL (ref 70–99)
PLATELET # BLD AUTO: 132 10E9/L (ref 150–450)

## 2019-06-06 PROCEDURE — 99232 SBSQ HOSP IP/OBS MODERATE 35: CPT | Performed by: INTERNAL MEDICINE

## 2019-06-06 PROCEDURE — A9270 NON-COVERED ITEM OR SERVICE: HCPCS | Mod: GY | Performed by: INTERNAL MEDICINE

## 2019-06-06 PROCEDURE — 25000132 ZZH RX MED GY IP 250 OP 250 PS 637: Mod: GY | Performed by: INTERNAL MEDICINE

## 2019-06-06 PROCEDURE — 25000131 ZZH RX MED GY IP 250 OP 636 PS 637: Mod: GY | Performed by: INTERNAL MEDICINE

## 2019-06-06 PROCEDURE — 36415 COLL VENOUS BLD VENIPUNCTURE: CPT | Performed by: INTERNAL MEDICINE

## 2019-06-06 PROCEDURE — 00000146 ZZHCL STATISTIC GLUCOSE BY METER IP

## 2019-06-06 PROCEDURE — 12000000 ZZH R&B MED SURG/OB

## 2019-06-06 PROCEDURE — 25000128 H RX IP 250 OP 636: Performed by: INTERNAL MEDICINE

## 2019-06-06 PROCEDURE — 85049 AUTOMATED PLATELET COUNT: CPT | Performed by: INTERNAL MEDICINE

## 2019-06-06 RX ORDER — QUETIAPINE FUMARATE 25 MG/1
25 TABLET, FILM COATED ORAL 2 TIMES DAILY PRN
Status: DISCONTINUED | OUTPATIENT
Start: 2019-06-06 | End: 2019-06-07 | Stop reason: HOSPADM

## 2019-06-06 RX ORDER — LABETALOL 100 MG/1
200 TABLET, FILM COATED ORAL 2 TIMES DAILY
Status: DISCONTINUED | OUTPATIENT
Start: 2019-06-06 | End: 2019-06-07 | Stop reason: HOSPADM

## 2019-06-06 RX ADMIN — HEPARIN SODIUM 5000 UNITS: 5000 INJECTION, SOLUTION INTRAVENOUS; SUBCUTANEOUS at 01:31

## 2019-06-06 RX ADMIN — LEVOTHYROXINE SODIUM 50 MCG: 50 TABLET ORAL at 08:21

## 2019-06-06 RX ADMIN — GABAPENTIN 600 MG: 300 CAPSULE ORAL at 21:12

## 2019-06-06 RX ADMIN — ASPIRIN 81 MG: 81 TABLET, COATED ORAL at 08:21

## 2019-06-06 RX ADMIN — INSULIN ASPART 1 UNITS: 100 INJECTION, SOLUTION INTRAVENOUS; SUBCUTANEOUS at 09:10

## 2019-06-06 RX ADMIN — PHENYTOIN 1 MG: 125 SUSPENSION ORAL at 21:13

## 2019-06-06 RX ADMIN — PRAVASTATIN SODIUM 10 MG: 10 TABLET ORAL at 21:12

## 2019-06-06 RX ADMIN — LEVETIRACETAM 750 MG: 500 TABLET, FILM COATED ORAL at 21:12

## 2019-06-06 RX ADMIN — INSULIN GLARGINE 18 UNITS: 100 INJECTION, SOLUTION SUBCUTANEOUS at 21:13

## 2019-06-06 RX ADMIN — LEVETIRACETAM 750 MG: 500 TABLET, FILM COATED ORAL at 08:20

## 2019-06-06 RX ADMIN — LABETALOL HYDROCHLORIDE 200 MG: 100 TABLET, FILM COATED ORAL at 13:21

## 2019-06-06 RX ADMIN — INSULIN ASPART 2 UNITS: 100 INJECTION, SOLUTION INTRAVENOUS; SUBCUTANEOUS at 19:00

## 2019-06-06 RX ADMIN — LABETALOL HYDROCHLORIDE 200 MG: 100 TABLET, FILM COATED ORAL at 21:12

## 2019-06-06 RX ADMIN — TACROLIMUS 2 MG: 1 CAPSULE ORAL at 21:12

## 2019-06-06 RX ADMIN — AMLODIPINE BESYLATE 10 MG: 10 TABLET ORAL at 08:21

## 2019-06-06 RX ADMIN — ERTAPENEM SODIUM 1 G: 1 INJECTION, POWDER, LYOPHILIZED, FOR SOLUTION INTRAMUSCULAR; INTRAVENOUS at 21:26

## 2019-06-06 RX ADMIN — PREDNISONE 5 MG: 5 TABLET ORAL at 08:21

## 2019-06-06 RX ADMIN — TACROLIMUS 2 MG: 1 CAPSULE ORAL at 08:21

## 2019-06-06 RX ADMIN — FAMOTIDINE 20 MG: 20 TABLET, FILM COATED ORAL at 08:21

## 2019-06-06 RX ADMIN — GABAPENTIN 600 MG: 300 CAPSULE ORAL at 08:21

## 2019-06-06 RX ADMIN — HEPARIN SODIUM 5000 UNITS: 5000 INJECTION, SOLUTION INTRAVENOUS; SUBCUTANEOUS at 13:21

## 2019-06-06 ASSESSMENT — ACTIVITIES OF DAILY LIVING (ADL)
ADLS_ACUITY_SCORE: 35
ADLS_ACUITY_SCORE: 35
ADLS_ACUITY_SCORE: 34

## 2019-06-06 NOTE — CONSULTS
"Received consult - Patient/family request:  Pt requesting information on carbohdrate counts, fiber diet    Verbally discussed diarrhea treatment for when patient receives antibiotics.  Patient states that she already takes probiotics and does the \"BRAT\" diet when she has acute diarrhea.    She also had questions on carbohydrate foods and portion sizes + how many carbs she needs per meal.    Provided written information on Type II DM which includes all of the requested information.  She was appreciative of information and did not have any further questions at this time.    Rosalva Luna RD, LD, [facility]   Clinical Dietitian - [facility]     "

## 2019-06-06 NOTE — CONSULTS
Care Transition Initial Assessment - RN        Met with: Patient.  DATA   Principal Problem:    Encephalopathy acute  Active Problems:    Kidney replaced by transplant    Diabetes mellitus with background retinopathy (H)    Diabetic gastroparesis (H)    Benign essential hypertension    Diabetic polyneuropathy associated with type 2 diabetes mellitus (H)    Morbid obesity, unspecified obesity type (H)    Obstructive sleep apnea syndrome    Chronic indwelling Johnson catheter    UTI (urinary tract infection)       Cognitive Status: awake, alert and oriented.        Contact information and PCP information verified: Yes  Lives With: spouse                     Insurance concerns: No Insurance issues identified  ASSESSMENT  Patient currently receives the following services:  Pt receives [facility] RN/HHA.  Lives with  who helps with cares.  Pt reports she has all the equipment at home and doesn't feel she needs any change in services        Identified issues/concerns regarding health management: Admitted with UTI, AMS.  Received IVF/IV abx.  Met with patient and explained role.  She is eager to discharge home, hopefully tomorrow.  Pt would like to resume care with [facility].  They are aware of potential discharge tomorrow with resumption of services.  Pt needs a stretcher ride home.  She states this is usually arranged with [facility] transport.  Ride arranged with [facility] stretcher for 1030 6/7.  MD are and in agreement with this plan.  [facility]   Parish 10, 2019  2:00 PM CDT  Telephone Visit with ANTHONY Antonio CNP  [facility] ([facility]) [address] [place] [address]  [address]  [facility] [address] [phone]     Pt would like resources on carbohydrate counting and high fiber diet.  Dietician consult ordered.     PLAN  Financial costs for the patient include TBD .  Patient given options and choices for discharge yes .  Patient/family is  agreeable to the plan?  Yes:   Patient anticipates discharging to home .        Patient anticipates needs for home equipment: No  Transportation/person available to transport on day of discharge  is [facility] and have they been notified/set up   Plan/Disposition: Home   Appointments: see S      Care  (CTS) will continue to follow as needed.

## 2019-06-06 NOTE — PROGRESS NOTES
[facility]    Medicine Progress Note - Hospitalist Service        Date of Admission:  6/2/2019  8:13 PM    Assessment & Plan:   Gem Jade is a 73 year old female admitted on 6/2/2019. She presents to the emergency department with decreased level of alertness consistent with prior episodes of encephalopathy with urinary tract infections.     Acute encephalopathy, secondary to catheter related urinary tract infection  Chronic indwelling Johnson  -History of frequent urinary tract infections associated with chronic indwelling Johnson catheter.  Most recent urine cultures 4/24/2019 and 5/10/2019 demonstrated greater than 100,000 colony-forming units of Citrobacter.  Has a history of VRE, MRSA, and ESBL.  Infectious disease was contacted in the emergency department by Dr. Cline and recommended ertapenem as well as a urine Gram stain to help with determining patient's antibiotic tailoring.  Note that patient has followed with infectious disease clinic in the past related to recurrent urinary tract infections with colonization.  Patient on multiple sedating medications including baclofen, gabapentin.   -Continue indwelling Johnson catheter for retention; Catheter exchanged in the emergency department  -Urine culture growing Citrobacter and staph aureus  -Blood culture positive for staph capitis, more than likely a contaminant, repeat blood culture negative.  -Continue Invanz for now.  Likely will discharge on combination of PO Keflex and ciprofloxacin.  -Continue prior to admission gabapentin    Likely acute delirium  -Patient having intermittent issues with hallucinations, worse overnight.  -Received 1 dose of IV Haldol  -Appears to be much better right now.  Will try PRN Seroquel  -Monitor for now.    Obstructive sleep apnea on CPAP:   -Mild elevation in PCO2 on VBG in the emergency department, though does not seem to be primary cause of patient's somnolence.  -CPAP as per home settings     Possible  left lower lobe pneumonia: Patient with no cough, no fevers, no shaking chills.  With patient's degree of somnolence, more likely represents atelectasis.  Patient does have encephalopathy, though this is common for her in the setting of volume depletion and urinary tract infections.  -Procalcitonin added on  -Continuing ertapenem as above, though this is primarily for treatment of pyuria and suspected urinary tract infection.  Low suspicion for pneumonia currently.     Type 2 diabetes with retinopathy, nephropathy, neuropathy including diabetic gastroparesis: Prior to admission dosing of Lantus appears to be 30 units at bedtime, NovoLog 10 units 3 times daily with meals and sliding scale insulin.  Last hemoglobin A1c of 6.2 2019.  -Continue prior to admission Lantus at 18 units at bedtime(PTA on 30 units at bedtime)  -Medium dose sliding scale insulin as well as 1 unit per 20 g carbohydrate prandial insulin.  -Continue prior to admission aspirin 81 mg daily     End-stage renal disease status post historic right lower quadrant renal transplant:   -Renal transplant in .  Uncertain if this was living or  donor.  -Continue prednisone 5 mg daily at this time;   -Continue tacrolimus  -Renal function currently appears to be around baseline     Severe obesity:   -Increased risk of all cause mortality  -Daily weights, intake and output  -Weight loss as able.  Patient limited from a mobility standpoint given she is wheelchair/bedbound by history.  -Physical therapy consulted for mobilization to prevent pressure injuries while hospitalized     History of provoked bilateral PE:   -not on anticoags  -Subcutaneous heparin for DVT prophylaxis     Paroxysmal atrial fibrillation: Note 2016 EKG demonstrating A. fib with RVR in the setting of neurovirus with diarrhea.  Underwent 48-hour Holter monitoring in May 2016 demonstrating sinus rhythm with occasional supraventricular tachycardia.  No atrial fibrillation  reported on that study.  Currently in sinus rhythm.  At time of event in 2016, recognition was for discussion with primary care provider regarding risk versus benefit of anticoagulation.  I do not believe patient has had any recurrent events of atrial fibrillation since, though all EKGs were not reviewed since this timeframe.  [id number] score of 3 for age, female sex, diabetes.  -Defer further discussion of anticoagulation to primary care provider.       History of seizure disorder:   -Keppra level therapeutic, encephalopathy much improved.  -Continue PTA keppra     Hypertension:  -Continue prior to admission amlodipine 10 milligrams daily  -As needed labetalol available for persistent hypertension greater than 180     Cystic finding in the pancreatic body.  Incidental on extensive imaging in the emergency department.  Has been noted previously as well.  Question of intraductal papillary mucinous neoplasm.  Patient follows with Dr. Chen of gastroenterology, largely for her gastroparesis.  -Defer any further follow-up to outpatient gastroenterology.     Hypothyroidism:  -Continue prior to admission levothyroxine 50 mcg daily once verified by the pharmacy  -TSH within normal limits.    Diet: Combination Diet Dysphagia Diet Level 2: Mechan Altered; Thin Liquids (water, ice chips, juice, milk gelatin, ice cream, etc) (No straws)     DVT Prophylaxis: Pneumatic Compression Devices   Johnson Catheter: in place, indication: Retention  Code Status: Full Code     Disposition Plan    Expected discharge: 6/7  Entered: Catrachito Robert MD 06/06/2019, 10:31 AM        The patient's care was discussed with the Bedside Nurse and Patient and pt's spouse    Catrachito Robert MD  Hospitalist Service  [facility]    ______________________________________________________________________    Interval History   Encephalopathy continues to improve however patient having intermittent issues with hallucinations especially  "during the night.  Afebrile.  Denies new complaints today.    Data reviewed today: I reviewed all medications, new labs and imaging results over the last 24 hours. I personally reviewed no images or EKG's today.    Physical Exam   Vital signs:  Temp: 98.2  F (36.8  C) Temp src: Oral BP: 135/47   Heart Rate: 64 Resp: 15 SpO2: 98 % O2 Device: None (Room air)        Estimated body mass index is 35.1 kg/m  as calculated from the following:    Height as of 2/28/19: 1.753 m (5' 9\").    Weight as of 4/27/19: 107.8 kg (237 lb 10.5 oz).      Wt Readings from Last 2 Encounters:   04/27/19 107.8 kg (237 lb 10.5 oz)   03/03/19 113 kg (249 lb 1.9 oz)       Gen: Awake, alert, oriented x3, no hallucination at the moment  HEENT:  no pallor  Resp: CTA B/L, normal WOB  CVS: RRR, no murmur  Abd/GI: Soft, non-tender. BS- normoactive.    Skin: Warm, dry no rashes  MSK: No joint deformities, trace pedal edema  Neuro- No focal deficits;  follows commands appropriately.    Data   Recent Labs   Lab 06/06/19  0707 06/04/19  0739 06/03/19  0757 06/02/19 2028   WBC  --  6.1  --  7.0   HGB  --  10.4*  --  10.8*   MCV  --  94  --  93   * 154 159 177   NA  --  144  --  142   POTASSIUM  --  4.3  --  4.9   CHLORIDE  --  115*  --  109   CO2  --  21  --  27   BUN  --  34*  --  45*   CR  --  1.57*  --  1.84*   ANIONGAP  --  8  --  6   ROGER  --  8.6  --  8.9   GLC  --  118*  --  112*   ALBUMIN  --  2.9*  --  3.2*   PROTTOTAL  --  7.3  --  8.2   BILITOTAL  --  0.3  --  0.3   ALKPHOS  --  113  --  117   ALT  --  21  --  28   AST  --  14  --  17   TROPI  --   --   --  <0.015       No results found for this or any previous visit (from the past 24 hour(s)).  Medications       amLODIPine  10 mg Oral Daily     aspirin  81 mg Oral Daily     ertapenem (INVanz) IV  1 g Intravenous Q24H     famotidine  20 mg Oral Daily     gabapentin  600 mg Oral BID     heparin  5,000 Units Subcutaneous Q12H     insulin aspart  1-7 Units Subcutaneous TID AC     insulin " aspart  1-5 Units Subcutaneous At Bedtime     insulin aspart   Subcutaneous TID w/meals     insulin glargine  15 Units Subcutaneous At Bedtime     labetalol  200 mg Oral BID     levETIRAcetam  750 mg Oral BID     levothyroxine  50 mcg Oral Daily     pravastatin  10 mg Oral Daily     prazosin  1 mg Oral At Bedtime     predniSONE  5 mg Oral Daily     sodium chloride (PF)  3 mL Intracatheter Q8H     tacrolimus  2 mg Oral BID

## 2019-06-06 NOTE — PLAN OF CARE
A&O x4, hallucinating alligators this AM but improvement throughout the day. VSS. Denies pain. Ax2 + lift, turn and repo q2h. Blanchable redness on bottom and dry flaky skin. Mepilex on coccyx. DD2 with thin liquids, completed full breakfast but that was the first full meal the pt has had since admission. Denied lunch. Nutrition services consulted. Chronic rico with adequate UOP. Incontinent of stool, diarrhea x1 today. x2 PIV SL, int abx. Plan to discharge tomorrow on PO abx.

## 2019-06-06 NOTE — PLAN OF CARE
A&O x4. VSS. Denies pain. Ax2 + lift, turn and repo q2h. Pt has dry flaky skin. Mepilex on coccyx. DD2 with thin liquids. Did not want to eat dinner. Attempted to have patient eat twice. Chronic rico with adequate UOP. Incontinent of stool, diarrhea x1 today for AMs none for 3-7p. x2 PIV SL, int abx. Plan to discharge tomorrow with [facility] arriving at 10:15am..

## 2019-06-06 NOTE — PROGRESS NOTES
Antimicrobial Stewardship Team Note    Antimicrobial Stewardship Program - A joint venture between [facility] and [facility] to optimize antibiotic management.     Patient: Gem Jade  MRN: [id number]  Allergies: Atorvastatin calcium; Codeine; Darvocet [propoxyphene n-apap]; Hydromorphone; Levofloxacin; Morphine; Niaspan [niacin]; Percocet [oxycodone-acetaminophen]; and Vicodin [hydrocodone-acetaminophen]    Brief Summary: 73 year old female admitted on 6/2/2019. She presents to the emergency department with decreased level of alertness consistent with prior episodes of encephalopathy with urinary tract infections.     Acute encephalopathy, secondary to catheter related urinary tract infection  Chronic indwelling Johnson  -History of frequent urinary tract infections associated with chronic indwelling Johnson catheter.  Most recent urine cultures 4/24/2019 and 5/10/2019 demonstrated greater than 100,000 colony-forming units of Citrobacter.  Has a history of VRE, MRSA, and ESBL.  Infectious disease was contacted in the emergency department by Dr. Cline and recommended ertapenem as well as a urine Gram stain to help with determining patient's antibiotic tailoring.  Note that patient has followed with infectious disease clinic in the past related to recurrent urinary tract infections with colonization.  Patient on multiple sedating medications including baclofen, gabapentin.   -Continue indwelling Johnson catheter for retention; Catheter exchanged in the emergency department  -Urine culture growing Citrobacter and staph aureus; await final I/D and sens  -Blood culture positive for staph capitis, more than likely a contaminant, repeat blood culture negative.             Active Anti-infective Medications   (From admission, onward)                Start     Stop    06/03/19 2200  ertapenem  1 g,   Intravenous,   EVERY 24 HOURS     Urinary Tract Infection        --           Assessment: Agree that positive staph in the blood is likely a contaminate.  Based on sensitivities of citrobacter and staph in urine we recommend changing to Ceftriaxone or Cefdinir 300 mg BID if you are looking for a oral option.     Recommendations:  Medication Change:   -  Recommend changing to Ceftriaxone or Cefdinir 300 mg BID if you are looking for a oral option.      Pharmacy took the following actions:  .    Discussed with ID Staff Dr. Jonah Fernandez PharmD  Vital Signs/Clinical Features:  Vitals         06/04 0700  -  06/05 0659 06/05 0700  -  06/06 0659 06/06 0700  -  06/06 1554   Most Recent    Temp ( F) 96.7 -  98.8    97 -  97.9    97.1 -  98.2     97.1 (36.2)    Pulse 73 -  76      84       84    Heart Rate   68 -  72    64 -  78     78    Resp 16 -  18    14 -  18    15 -  16     16    /61 -  173/64    156/59 -  182/76    135/47 -  146/56     146/56    SpO2 (%) 94 -  96    94 -  99    94 -  98     94            Labs  Estimated Creatinine Clearance: 42.3 mL/min (A) (based on SCr of 1.57 mg/dL (H)).  Recent Labs   Lab Test 04/24/19  1258 04/25/19  0811 04/26/19  0754 05/10/19  1215 06/02/19 2028 06/04/19  0739   CR 1.78* 1.65* 1.57* 1.94* 1.84* 1.57*       Recent Labs   Lab Test 03/12/19  1230 03/19/19  1210 03/27/19  1216 04/24/19  1258 04/25/19  0811 04/26/19  0754 05/10/19  1215 06/02/19 2028 06/03/19  0757 06/04/19  0739 06/06/19  0707   WBC 6.2 5.8 5.6 7.4 6.5 6.0 7.6 7.0  --  6.1  --    ANEU 3.7 3.6 3.7 5.0  --   --  5.5 5.4  --   --   --    ALYM 1.7 1.4 1.2 1.5  --   --  1.3 1.2  --   --   --    CARROLL 0.6 0.6 0.5 0.6  --   --  0.5 0.2  --   --   --    AEOS 0.2 0.3 0.2 0.2  --   --  0.2 0.1  --   --   --    HGB 8.7* 9.7* 9.7* 10.6* 11.2* 9.6* 9.5* 10.8*  --  10.4*  --    HCT 27.9* 30.4* 30.8* 33.3* 35.5 30.6* 29.9* 34.2*  --  33.2*  --    MCV 94 92 93 93 93 92 92 93  --  94  --     157 165 138* 118* 117* 168 177 159 154 132*       Recent Labs   Lab Test 09/15/18  5215  09/16/18  0751 09/17/18  1251 02/28/19  0035 04/24/19  1258 06/02/19 2028 06/04/19  0739   BILITOTAL 0.3  --  0.2 0.4 0.3 0.3 0.3   ALKPHOS 71  --  54 103 103 117 113   ALBUMIN 3.0* 2.8* 2.6* 3.2* 2.9* 3.2* 2.9*   AST 5  --  6 31 11 17 14   ALT 10  --  9 31 22 28 21       Recent Labs   Lab Test 12/29/14  1330 04/28/15  1650  02/18/16  1436  05/25/16  2116  08/31/16  0801  03/08/17  1243  07/28/18  1435 09/13/18  1335 09/15/18  2347 01/24/19 2005 02/28/19  0021 02/28/19  0035 02/28/19  0336 02/28/19 0612 04/24/19  1258 05/10/19  1215 06/02/19 2028 06/02/19 2047   PCAL  --   --   --   --   --   --    < > 0.09  --   --    < > 0.05 0.48  --  0.15  --   --   --  0.54  --  0.09 <0.05  --    LACT  --   --    < >  --    < >  --    < >  --    < > 0.6*   < >  --   --  1.2  --  0.4* 1.4 0.6*  --  0.9  --   --  0.8   .0* 145.0*  --  8.8*  --  <2.9  --  39.0*  --  7.5  --   --   --   --   --   --   --   --   --   --   --   --   --    SED  --   --   --   --   --  53*  --   --   --   --   --   --   --   --   --   --   --   --   --   --   --   --   --     < > = values in this interval not displayed.       Recent Labs   Lab Test 07/16/13  1220  08/21/17  1210  03/01/19  0544  06/03/19  0731   VANCOMYCIN  --    < >  --    < > 18.2  --   --    TACROL 3.6*   < > <3.0*   < >  --    < > 4.7*   RAPAMY  --    < > <2.0*  --   --   --   --    MPACID 3.13  --   --   --   --   --   --    MPAG 98.3*  --   --   --   --   --   --     < > = values in this interval not displayed.       Culture Results:  7-Day Micro Results       Procedure Component Value Units Date/Time    Blood culture [] Collected:  06/04/19 0738    Order Status:  Completed Lab Status:  Preliminary result Updated:  06/06/19 0312    Specimen:  Blood      Specimen Description Blood Left Hand     Special Requests Received in aerobic bottle only     Culture Micro No growth after 2 days    Gram stain     Order Status:  Sent Lab Status:  No result     Specimen:  Urine      Urine Culture Aerobic Bacterial [[id number]]  (Abnormal)  (Susceptibility) Collected:  06/02/19 2122    Order Status:  Completed Lab Status:  Final result Updated:  06/05/19 2658    Specimen:  Midstream Urine      Specimen Description Midstream Urine     Culture Micro >100,000 colonies/mL  Citrobacter freundii complex        >100,000 colonies/mL  Staphylococcus aureus      Susceptibility       Citrobacter freundii complex (1)       Antibiotic Interpretation Sensitivity Method Status    CEFAZOLIN Resistant >=64 ug/mL SHEELA Final     Cefazolin SHEELA breakpoints are for the treatment of uncomplicated urinary tract   infections.  For the treatment of systemic infections, please contact the   laboratory for additional testing.  Intrinsically Resistant    CEFOXITIN Resistant 32 ug/mL SHEELA Final     Intrinsically Resistant    CEFTAZIDIME Sensitive <=1 ug/mL SHEELA Final    CEFTRIAXONE Sensitive <=1 ug/mL SHEELA Final    CIPROFLOXACIN Sensitive <=0.25 ug/mL SHEELA Final    GENTAMICIN Sensitive <=1 ug/mL SHEELA Final    LEVOFLOXACIN Intermediate 1 ug/mL SHEELA Final    NITROFURANTOIN Sensitive <=16 ug/mL SHEELA Final    TOBRAMYCIN Sensitive <=1 ug/mL SHEELA Final    Trimethoprim/Sulfa Sensitive <=1/19 ug/mL SHEELA Final    Piperacillin/Tazo Sensitive <=4 ug/mL SHEELA Final    AMIKACIN [*]  Sensitive <=2 ug/mL SHEELA Final    CEFEPIME Sensitive <=1 ug/mL SHEELA Final    MEROPENEM [*]  Sensitive <=0.25 ug/mL SHEELA Final              Staphylococcus aureus (2)       Antibiotic Interpretation Sensitivity Method Status    CIPROFLOXACIN [*]  Sensitive <=0.5 ug/mL SHEELA Final    GENTAMICIN Sensitive <=0.5 ug/mL SHEELA Final    LEVOFLOXACIN [*]  Sensitive 0.25 ug/mL SHEELA Final    NITROFURANTOIN Sensitive <=16 ug/mL SHEELA Final    OXACILLIN Sensitive <=0.25 ug/mL SHEELA Final    TETRACYCLINE Sensitive <=1 ug/mL SHEELA Final    Trimethoprim/Sulfa Sensitive <=0.5/9.5 ug/mL SHEELA Final    VANCOMYCIN Sensitive 1 ug/mL SHEELA Final    RIFAMPIN [*]  Sensitive <=0.5 ug/mL SHEELA Final    TIGECYCLINE [*]   Sensitive <=0.12 ug/mL SHEELA Final    LINEZOLID [*]  Sensitive 2 ug/mL SHEELA Final    Quinupristin/Dalfopr [*]  Sensitive 0.5 ug/ml SHEELA Final    MOXIFLOXACIN [*]  Sensitive <=0.25 ug/mL SHEELA Final    Cefoxitin Screen [*]  Sensitive Negative  SHEELA Final    Oxacillin Screen [*]  Sensitive   SHEELA Final     26                 [*]   Suppressed Antibiotic                   Blood culture [[id number]]  (Abnormal)  (Susceptibility) Collected:  06/02/19 2111    Order Status:  Completed Lab Status:  Final result Updated:  06/05/19 0729    Specimen:  Blood      Specimen Description Blood Right Arm     Special Requests Aerobic and anaerobic bottles received     Culture Micro Cultured on the 1st day of incubation:  Staphylococcus capitis  This isolate DOES NOT demonstrate inducible clindamycin resistance in vitro. Clindamycin   is susceptible and could be used when indicated, however, erythromycin is resistant and   should not be used.        Critical Value/Significant Value, preliminary result only, called to and read back by  Jyoti Brower RN on 06.03.2019 at 2151. JRT        (Note)  POSITIVE for Staphylococci other than S.aureus, S.epidermidis and  S.lugdunensis, by Affinegyigene multiplex nucleic acid test.  Coagulase-negative staphylococci are the most common venipuncture or  collection associated skin CONTAMINANTS grown in blood cultures.  Final identification and antimicrobial susceptibility testing will be  verified by standard methods.    Specimen tested with Verigene multiplex, gram-positive blood culture  nucleic acid test for the following targets: Staph aureus, Staph  epidermidis, Staph lugdunensis, other Staph species, Enterococcus  faecalis, Enterococcus faecium, Streptococcus species, S. agalactiae,  S. anginosus grp., S. pneumoniae, S. pyogenes, Listeria sp., mecA  (methicillin resistance) and Viridiana/B (vancomycin resistance).    Critical Value/Significant Value called to and read back by  DENNIS RAMIREZ RN ([id number]).  06.04.19   0028 GJS      Susceptibility       Staphylococcus capitis (1)       Antibiotic Interpretation Sensitivity Method Status    CIPROFLOXACIN Resistant 4 ug/mL SHEELA Final    CLINDAMYCIN Sensitive <=0.25 ug/mL SHEELA Final    ERYTHROMYCIN Resistant >=8 ug/mL SHEELA Final    GENTAMICIN Sensitive <=0.5 ug/mL SHEELA Final    LEVOFLOXACIN Resistant 4 ug/mL SHEELA Final    NITROFURANTOIN [*]  Sensitive <=16 ug/mL SHEELA Final    OXACILLIN Resistant >=4 ug/mL SHEELA Final    RIFAMPIN [*]  Sensitive <=0.5 ug/mL SHEELA Final    TETRACYCLINE Resistant >=16 ug/mL SHEELA Final    VANCOMYCIN Sensitive 1 ug/mL SHEELA Final    LINEZOLID [*]  Sensitive 2 ug/mL SHEELA Final    Quinupristin/Dalfopr [*]  Sensitive 0.5 ug/ml SHEELA Final    MOXIFLOXACIN [*]  Intermediate 1 ug/mL SHEELA Final    Cefoxitin Screen [*]  Resistant Positive  SHEELA Final    TIGECYCLINE [*]  Sensitive 0.25 ug/mL SHEELA Final    Inducible Clinda [*]  Sensitive Negative ug/mL SHEELA Final    Trimethoprim/Sulfa [*]  Sensitive   SHEELA Final    Oxacillin Screen [*]  Resistant   SHEELA Final     17                 [*]   Suppressed Antibiotic                   Blood culture [[id number]] Collected:  06/02/19 2028    Order Status:  Completed Lab Status:  Preliminary result Updated:  06/06/19 0311    Specimen:  Blood      Specimen Description Blood Left Wrist     Special Requests Aerobic and anaerobic bottles received     Culture Micro No growth after 3 days            Recent Labs   Lab Test 03/27/19  1216 04/24/19  1405 05/10/19  1215 06/02/19 2028 06/02/19  2122   URINEPH 6.0 6.0 7.0 5.5 6.0   NITRITE Negative Negative Positive* Positive* Positive*   LEUKEST Moderate* Large* Moderate* Large* Large*   WBCU >100* >182* >182* 94* >182*       Recent Labs   Lab Test 09/02/16  0940 05/30/18  0845   CWBC 0  Duplicate request  Test not indicated.   Canceled, Test credited  0   CRBC 2  Duplicate request  Test not indicated.   Canceled, Test credited  3*   CGLU 89* 100*   CTP 44 48       Recent Labs   Lab Test 01/13/18  1612    RVSPEC Nasal   IFLUA Negative   FLUAH1 Negative   FLUAH3 Negative   TF4689 Negative   IFLUB Negative   RSVA Negative   RSVB Negative   PIV1 Negative   PIV2 Negative   PIV3 Negative   HMPV Negative   HRVS Negative   ADVBE Negative   ADVC Negative       Recent Labs   Lab Test 03/04/19  2113   CDBPCT Negative       Imaging: Ct Chest Abdomen Pelvis W/o Contrast    Result Date: 6/2/2019  CT CHEST/ABDOMEN/PELVIS WITHOUT CONTRAST 6/2/2019 11:08 PM HISTORY: History of renal transplant; left low chest/left upper abdominal tenderness to palpation. TECHNIQUE: Volumetric acquisition through chest, abdomen and pelvis without contrast.  Radiation dose for this scan was reduced using automated exposure control, adjustment of the mA and/or kV according to patient size, or iterative reconstruction technique. COMPARISON: 3/5/2019. FINDINGS: Chest: Small area of mild hazy opacity in the left lower lung which could be atelectasis or pneumonia. Mild linear atelectasis or scarring in the right lower lung. Normal heart size. Coronary artery and mitral annular calcifications. Thoracic aortic calcification. No enlarged mediastinal or hilar lymph nodes. Small bilateral pleural effusions and bibasilar atelectasis seen previously have resolved. Abdomen and pelvis: Marked renal cortical atrophy. Well-circumscribed, rounded slightly increased density lesion measuring 1.7 cm arising from the right mid kidney anteriorly is stable. No hydronephrosis. Gallbladder not seen. 2.9 cm cyst in the pancreatic body is stable. The upper abdominal organs are otherwise negative without contrast. Renal transplant in the right lower quadrant without hydronephrosis or associated perirenal fluid collections. Johnson catheter decompressing the urinary bladder making slight bladder wall thickening difficult to exclude. Correlate with urinalysis. Uterus is present. No suspicious adnexal masses. Scattered colonic diverticula. No bowel obstruction or other acute  findings.     IMPRESSION: 1. Mild hazy opacity in the left lower lung may be pneumonia. 2. No other acute findings. 3. Atrophic native kidneys with right lower quadrant renal transplant. Stable right renal lesion which may be a hemorrhagic cyst or small solid lesion. 4. Redemonstrated cystic lesion in the pancreatic body. This could be an IPMN. CITLALLI GOMEZ MD    Head Ct W/o Contrast    Result Date: 6/2/2019  CT HEAD WITHOUT CONTRAST  6/2/2019 11:06 PM HISTORY: Altered level of consciousness (LOC), unexplained. Patient is not answering questions appropriately. TECHNIQUE:  Axial images of the head and coronal reformations without contrast. Radiation dose for this scan was reduced using automated exposure control, adjustment of the mA and/or kV according to patient size, or iterative reconstruction technique. COMPARISON: 4/24/2019. FINDINGS: Mild cortical atrophy. Mild patchy low-attenuation areas are present in white matter of the cerebral hemispheres that are nonspecific but consistent with chronic small vessel ischemic changes in this age patient. No intracranial hemorrhage, mass or mass effect. No acute infarct identified. No shift of midline structures. No skull fracture.     IMPRESSION: No acute intracranial abnormality. CITLALLI GOMEZ MD

## 2019-06-06 NOTE — PROGRESS NOTES
Clinic Care Coordination Contact    Situation: Patient chart reviewed by care coordinator.    Background: chart reviewed    Assessment: patient currently admitted to hospital with altered mental status    Plan/Recommendations: will watch for hospital discharge and will contact patient    Porsha Khalil RN  [facility]-Care Coordination  [facility]  [phone]

## 2019-06-06 NOTE — PLAN OF CARE
A/ox4, but still reporting hallucinating of alligators but aware that she is hallucinating and others cannot see what she sees. LS dim. BS active, flatus. Incontinent of stool. Chronic rico in place. Redness/blanchable to inner buttock/coccyx- new mepilex applied. Turn/repo q2hrs. DD2- with thin liquids. Glucose checks, 2am . Continue to monitor.

## 2019-06-06 NOTE — PLAN OF CARE
A/o x4. Forgetful, starting to become more confused at end of shift. HTN, po meds given. IV ABX. Bed bath given new mepliex on, redness blanchable continue to t/r q2. Alex patent. Good put put cares done. BG insulin given at hs. Minimal intake on her DD2 thins. Plan pending pt progress. Keshawn  updated on plan of care.

## 2019-06-07 VITALS
OXYGEN SATURATION: 94 % | TEMPERATURE: 96.6 F | WEIGHT: 250.66 LBS | HEART RATE: 84 BPM | SYSTOLIC BLOOD PRESSURE: 158 MMHG | DIASTOLIC BLOOD PRESSURE: 53 MMHG | RESPIRATION RATE: 18 BRPM | BODY MASS INDEX: 37.02 KG/M2

## 2019-06-07 LAB
GLUCOSE BLDC GLUCOMTR-MCNC: 106 MG/DL (ref 70–99)
GLUCOSE BLDC GLUCOMTR-MCNC: 153 MG/DL (ref 70–99)

## 2019-06-07 PROCEDURE — A9270 NON-COVERED ITEM OR SERVICE: HCPCS | Mod: GY | Performed by: INTERNAL MEDICINE

## 2019-06-07 PROCEDURE — 25000132 ZZH RX MED GY IP 250 OP 250 PS 637: Mod: GY | Performed by: INTERNAL MEDICINE

## 2019-06-07 PROCEDURE — 25000131 ZZH RX MED GY IP 250 OP 636 PS 637: Mod: GY | Performed by: INTERNAL MEDICINE

## 2019-06-07 PROCEDURE — 99239 HOSP IP/OBS DSCHRG MGMT >30: CPT | Performed by: INTERNAL MEDICINE

## 2019-06-07 PROCEDURE — 25000128 H RX IP 250 OP 636: Performed by: INTERNAL MEDICINE

## 2019-06-07 PROCEDURE — 00000146 ZZHCL STATISTIC GLUCOSE BY METER IP

## 2019-06-07 RX ORDER — CEPHALEXIN 500 MG/1
500 CAPSULE ORAL 3 TIMES DAILY
Qty: 9 CAPSULE | Refills: 0 | Status: SHIPPED | OUTPATIENT
Start: 2019-06-07 | End: 2019-07-15

## 2019-06-07 RX ORDER — CIPROFLOXACIN 250 MG/1
250 TABLET, FILM COATED ORAL 2 TIMES DAILY
Qty: 10 TABLET | Refills: 0 | Status: SHIPPED | OUTPATIENT
Start: 2019-06-07 | End: 2019-06-07

## 2019-06-07 RX ORDER — CIPROFLOXACIN 250 MG/1
250 TABLET, FILM COATED ORAL 2 TIMES DAILY
Qty: 6 TABLET | Refills: 0 | Status: SHIPPED | OUTPATIENT
Start: 2019-06-07 | End: 2019-07-15

## 2019-06-07 RX ORDER — CEPHALEXIN 500 MG/1
500 CAPSULE ORAL 3 TIMES DAILY
Qty: 15 CAPSULE | Refills: 0 | Status: SHIPPED | OUTPATIENT
Start: 2019-06-07 | End: 2019-06-07

## 2019-06-07 RX ORDER — LABETALOL 200 MG/1
200 TABLET, FILM COATED ORAL 2 TIMES DAILY
Qty: 60 TABLET | Refills: 0 | Status: SHIPPED | OUTPATIENT
Start: 2019-06-07 | End: 2019-06-14 | Stop reason: DRUGHIGH

## 2019-06-07 RX ADMIN — TACROLIMUS 2 MG: 1 CAPSULE ORAL at 09:28

## 2019-06-07 RX ADMIN — LEVOTHYROXINE SODIUM 50 MCG: 50 TABLET ORAL at 09:28

## 2019-06-07 RX ADMIN — GABAPENTIN 600 MG: 300 CAPSULE ORAL at 09:24

## 2019-06-07 RX ADMIN — LABETALOL HYDROCHLORIDE 200 MG: 100 TABLET, FILM COATED ORAL at 09:28

## 2019-06-07 RX ADMIN — FAMOTIDINE 20 MG: 20 TABLET, FILM COATED ORAL at 09:24

## 2019-06-07 RX ADMIN — ONDANSETRON 4 MG: 4 TABLET, ORALLY DISINTEGRATING ORAL at 09:23

## 2019-06-07 RX ADMIN — LEVETIRACETAM 750 MG: 500 TABLET, FILM COATED ORAL at 09:24

## 2019-06-07 RX ADMIN — AMLODIPINE BESYLATE 10 MG: 10 TABLET ORAL at 09:28

## 2019-06-07 RX ADMIN — ASPIRIN 81 MG: 81 TABLET, COATED ORAL at 09:25

## 2019-06-07 RX ADMIN — PREDNISONE 5 MG: 5 TABLET ORAL at 09:28

## 2019-06-07 RX ADMIN — HEPARIN SODIUM 5000 UNITS: 5000 INJECTION, SOLUTION INTRAVENOUS; SUBCUTANEOUS at 01:30

## 2019-06-07 ASSESSMENT — ACTIVITIES OF DAILY LIVING (ADL)
ADLS_ACUITY_SCORE: 35

## 2019-06-07 NOTE — PLAN OF CARE
A&O x4. VSS on RA-uses Cpap at night when sleeping. Denies pain. Pt was actively hallucinating at beginning of shift. Pt reported seeing people on the roof of outside buildings- continue to try and reorient. LS dim. Fecal incontinence. Blanchable redness to coccyx-new mepilex dressing applied overnight. Johnson patent. Turn/repo q2hrs. IV x2 -SL. Intermittent abx, possibly start PO abx in AM. Continue to monitor.

## 2019-06-07 NOTE — PLAN OF CARE
Pt is A/O x4. No hallucinations this AM. VSS. RA. No C/O pain. IV SL. AX2 with lift. DD2 diet with thin liquids. BG checks, no insulin coverage needed this AM. Pt is cleared for discharge. Discharge instructions and medications reviewed with patient. Patient verbalized understanding of discharge instructions, follow ups and how to take medications. Currently awaiting ride from [employer].

## 2019-06-07 NOTE — PROGRESS NOTES
Initial plan was to discharge on Keflex and ciprofloxacin, after reviewing notes from antibiotic stewardship program Cefdinir to cover both Citrobacter and staph aureus.  Given the convenience of single agent, antibiotic now changed to Cefnidir 300 mg p.o. twice daily for 3 days.  Prescription called into [facility] pharmacy.  Previous prescription for Keflex and ciprofloxacin discontinued.  Patient was informed via phone at [phone].

## 2019-06-07 NOTE — PROVIDER NOTIFICATION
MD Notification    Notified Person: MD    Notified Person Name: Dr. Robert Laurenmaikel    Notification Date/Time: 6/7/19 10:05     Notification Interaction: fyi page for face to face for initial resumption of HC RN/HHA per Josephine cert period ended    Purpose of Notification: FYI for face to face    Orders Received: awaiting orders or call back    Comments:

## 2019-06-07 NOTE — DISCHARGE SUMMARY
[facility]    Discharge Summary  Hospitalist    Date of Admission:  6/2/2019  Date of Discharge:  6/7/2019  Discharging Provider: Catrachito Robert MD    Discharge Diagnoses      Acute encephalopathy, secondary to catheter related urinary tract infection  Chronic indwelling Johnson  Likely acute delirium  Obstructive sleep apnea on CPAP   Type 2 diabetes with retinopathy, nephropathy, neuropathy including diabetic gastroparesis   End-stage renal disease status post historic right lower quadrant renal transplant:   Severe obesity  History of provoked bilateral PE:   Paroxysmal atrial fibrillation   History of seizure disorder:   Hypertension   Cystic finding in the pancreatic body   Hypothyroidism     Hospital Course:    Gem Jade is a 73 year old female admitted on 6/2/2019. She presents to the emergency department with decreased level of alertness consistent with prior episodes of encephalopathy with urinary tract infections.     Acute encephalopathy, secondary to catheter related urinary tract infection  Chronic indwelling Johnson  -History of frequent urinary tract infections associated with chronic indwelling Johnson catheter.  Most recent urine cultures 4/24/2019 and 5/10/2019 demonstrated greater than 100,000 colony-forming units of Citrobacter.  Has a history of VRE, MRSA, and ESBL.  Infectious disease was contacted in the emergency department by Dr. Cline and recommended ertapenem as well as a urine Gram stain to help with determining patient's antibiotic tailoring.  Note that patient has followed with infectious disease clinic in the past related to recurrent urinary tract infections with colonization.  Patient on multiple sedating medications including baclofen, gabapentin.   -Continue indwelling Johnson catheter for retention; Catheter exchanged in the emergency department  -Urine culture growing Citrobacter and staph aureus  -Blood culture positive for staph capitis, more than likely  a contaminant, repeat blood culture negative.  -Received 4 days of IV Invanz in the hospital.  -Discharged with Cefdinir 300 mg p.o. twice daily for 3 days more     Likely acute delirium  -Patient having intermittent issues with hallucination  -, Chang mentioned that this is not uncommon for her whenever she has encephalopathy related to UTI, this was improving.  It was felt appropriate to discharge her home.     Obstructive sleep apnea on CPAP:   -CPAP as per home settings     Possible left lower lobe pneumonia: Patient with no cough, no fevers, no shaking chills.   -PNA ruled out    Type 2 diabetes with retinopathy, nephropathy, neuropathy including diabetic gastroparesis   -New prior to admission regimen     End-stage renal disease status post historic right lower quadrant renal transplant:   -Renal transplant in .  Uncertain if this was living or  donor.  -Continue prednisone 5 mg daily at this time;   -Continue tacrolimus  -Renal function currently appears to be around baseline     Severe obesity:   -Increased risk of all cause mortality  -Outpatient follow-up.    History of provoked bilateral PE:   -not on anticoags     Paroxysmal atrial fibrillation: Note 2016 EKG demonstrating A. fib with RVR in the setting of neurovirus with diarrhea.  Underwent 48-hour Holter monitoring in May 2016 demonstrating sinus rhythm with occasional supraventricular tachycardia.  No atrial fibrillation reported on that study.  Currently in sinus rhythm.  At time of event in 2016, recognition was for discussion with primary care provider regarding risk versus benefit of anticoagulation.  I do not believe patient has had any recurrent events of atrial fibrillation since, though all EKGs were not reviewed since this timeframe.  Vroza8ipal score of 3 for age, female sex, diabetes.  -Defer further discussion of anticoagulation to primary care provider.       History of seizure disorder:   -Keppra level  therapeutic, encephalopathy much improved.  -Continue PTA keppra     Hypertension:  -Continue prior to admission amlodipine 10 milligrams daily     Cystic finding in the pancreatic body.  Incidental on extensive imaging in the emergency department.  Has been noted previously as well.  Question of intraductal papillary mucinous neoplasm.  Patient follows with Dr. Chen of gastroenterology, largely for her gastroparesis.  -Defer any further follow-up to outpatient gastroenterology.      Hypothyroidism:  -Continue prior to admission levothyroxine 50 mcg daily   -TSH within normal limits.       Catrachito Robert MD    Significant Results and Procedures   See below    Pending Results     Unresulted Labs Ordered in the Past 30 Days of this Admission     Date and Time Order Name Status Description    6/4/2019 0719 Blood culture Preliminary     6/2/2019 2024 Blood culture Preliminary           Code Status   Full Code       Primary Care Physician   Marivel Barcenas NP    Physical Exam   Temp: 96.6  F (35.9  C) Temp src: Oral BP: 158/53   Heart Rate: 61 Resp: 18 SpO2: 94 % O2 Device: None (Room air)      Constitutional: AAOX3, NAD, no hallucinations  Respiratory: CTA B/L, Normal WOB  Cardiovascular: RRR, No murmur  GI: Soft, Non- tender, BS- normoactive  Neuro: CN- grossly intact, Moving X4.     Discharge Disposition   Discharged to home  Condition at discharge: Stable    Consultations This Hospital Stay   PHYSICAL THERAPY ADULT IP CONSULT  SPEECH LANGUAGE PATH ADULT IP CONSULT  PHARMACY IP CONSULT  NUTRITION SERVICES ADULT IP CONSULT  CARE TRANSITION RN/SW IP CONSULT    Time Spent on this Encounter   ICatrachito, personally saw the patient today and spent greater than 30 minutes discharging this patient.    Discharge Orders      Home care nursing referral      Activity    Your activity upon discharge: activity as tolerated     Follow-up and recommended labs and tests    Follow up with primary care provider,  Marivel Barcenas NP,  this Monday (6/10) for hospital follow- up.  The following labs/tests are recommended: CBC and BMP in 1 week     Full Code     Diet    Follow this diet upon discharge: Orders Placed This Encounter      Combination Diet Dysphagia Diet Level 2: Mechan Altered; Thin Liquids (water, ice chips, juice, milk gelatin, ice cream, etc) (No straws)       Discharge Medications   Current Discharge Medication List      START taking these medications    Details   cephALEXin (KEFLEX) 500 MG capsule Take 1 capsule (500 mg) by mouth 3 times daily  Qty: 9 capsule, Refills: 0    Associated Diagnoses: Urinary tract infection associated with indwelling urethral catheter, subsequent encounter      ciprofloxacin (CIPRO) 250 MG tablet Take 1 tablet (250 mg) by mouth 2 times daily  Qty: 6 tablet, Refills: 0    Associated Diagnoses: Urinary tract infection associated with indwelling urethral catheter, subsequent encounter         CONTINUE these medications which have CHANGED    Details   labetalol (NORMODYNE) 200 MG tablet Take 1 tablet (200 mg) by mouth 2 times daily  Qty: 60 tablet, Refills: 0    Comments: Future refills by PCP Dr. Marivel Barcenas NP with phone number [phone].  Associated Diagnoses: Benign essential hypertension         CONTINUE these medications which have NOT CHANGED    Details   ACETAMINOPHEN PO Take 325-650 mg by mouth every 4 hours as needed.      amLODIPine (NORVASC) 5 MG tablet Take 2 tablets (10 mg) by mouth daily  Qty: 60 tablet, Refills: 3    Associated Diagnoses: Benign essential hypertension      aspirin 81 MG tablet Take 81 mg by mouth daily      baclofen (LIORESAL) 10 MG tablet Take 1 tablet (10 mg) by mouth 2 times daily as needed for other (Bladder Spasms)  Qty: 60 tablet, Refills: 3    Associated Diagnoses: Urinary catheter (Johnson) change required      blood glucose (ACCU-CHEK COMPACT PLUS) test strip USE TO TEST BLOOD SUGAR THREE TIMES A DAY  Qty: 102 each,  Refills: 11    Associated Diagnoses: Diabetic polyneuropathy associated with type 2 diabetes mellitus (H)      COMFORT EZ PEN NEEDLES 31G X 6 MM miscellaneous USE 4 DAILY OR AS DIRECTED  Qty: 100 each, Refills: 3    Associated Diagnoses: Type 2 diabetes mellitus (H)      Cranberry 400 MG TABS Take 400 mg by mouth 2 times daily      cyanocobalamin (VITAMIN B12) 1000 MCG/ML injection Inject 1 mL (1,000 mcg) into the muscle every 30 days  Qty: 3 mL, Refills: 3    Associated Diagnoses: Iron deficiency anemia, unspecified iron deficiency anemia type      diphenoxylate-atropine (LOMOTIL) 2.5-0.025 MG tablet Take 1-2 tablets by mouth 4 times daily as needed for diarrhea  Qty: 90 tablet, Refills: 0    Associated Diagnoses: Diarrhea, unspecified type      famotidine (PEPCID) 20 MG tablet Take 1 tablet (20 mg) by mouth 2 times daily  Qty: 60 tablet, Refills: 1      gabapentin (NEURONTIN) 300 MG capsule Take 2 capsules (600 mg) by mouth 2 times daily May take an additional 300mg at bedtime daily with increase pain  Qty: 360 capsule, Refills: 3    Associated Diagnoses: Diabetic polyneuropathy associated with type 2 diabetes mellitus (H)      insulin aspart (NOVOLOG FLEXPEN) 100 UNIT/ML pen Take 10 units with meals twice daily with sliding scale. Max 40 units per day  Sliding scale:   140-199 2u  200-249 4u  250-299 6u  300-349 8u  350-399 10u  Over 400 12u  Qty: 45 mL, Refills: 3    Associated Diagnoses: Type 2 diabetes mellitus with diabetic polyneuropathy, with long-term current use of insulin (H)      insulin glargine (LANTUS SOLOSTAR PEN) 100 UNIT/ML pen Inject 30 Units Subcutaneous At Bedtime  Qty: 45 mL, Refills: 3    Associated Diagnoses: Type 2 diabetes mellitus with diabetic polyneuropathy, with long-term current use of insulin (H)      Lactobacillus Rhamnosus, GG, (CULTURELL) capsule Take 1 capsule by mouth 2 times daily  Qty: 60 capsule, Refills: 11    Associated Diagnoses: Diarrhea      levETIRAcetam (KEPPRA) 750 MG  "tablet Take 1 tablet (750 mg) by mouth 2 times daily  Qty: 60 tablet, Refills: 2    Associated Diagnoses: Seizure disorder (H)      levothyroxine (SYNTHROID/LEVOTHROID) 50 MCG tablet TAKE ONE TABLET BY MOUTH EVERY DAY  Qty: 90 tablet, Refills: 1    Associated Diagnoses: Other specified hypothyroidism      lidocaine (XYLOCAINE) 2 % topical gel Apply topically as needed for moderate pain 10 ML every 3 weeks  Qty: 30 mL, Refills: 11    Associated Diagnoses: Johnson catheter in place      loratadine (CLARITIN) 10 MG tablet Take 1 tablet (10 mg) by mouth daily  Qty: 90 tablet, Refills: 3    Associated Diagnoses: Acute nasopharyngitis      menthol-zinc oxide (CALMOSEPTINE) 0.44-20.625 % OINT ointment Apply topically 4 times daily as needed for skin protection    Associated Diagnoses: Skin abrasion      ondansetron (ZOFRAN ODT) 4 MG ODT tab Take 1 tablet (4 mg) by mouth daily as needed for nausea 30 minutes before getting up in your wheelchair  Qty: 20 tablet, Refills: 1    Associated Diagnoses: Vertigo      !! order for DME Equipment being ordered: Tegaderm 4x4   MICHELLE 6 months  Sacral wound  Qty: 1 Box, Refills: 3    Associated Diagnoses: Decubitus ulcer of sacral region, unspecified ulcer stage      !! order for DME Equipment being ordered: DCWafers Iman gel-T cushion 20 inches wide & 18 inches deep    height 1.676 m (5' 6\"), weight 101.6 kg (223 lb 15.8 oz)  Qty: 1 each, Refills: 0    Associated Diagnoses: Back pain      pravastatin (PRAVACHOL) 10 MG tablet Take 1 tablet (10 mg) by mouth daily  Qty: 90 tablet, Refills: 3    Associated Diagnoses: Hyperlipidemia LDL goal <100      prazosin (MINIPRESS) 1 MG capsule Take 1 capsule (1 mg) by mouth At Bedtime  Qty: 30 capsule, Refills: 1    Associated Diagnoses: Nightmares      predniSONE (DELTASONE) 5 MG tablet Take 1 tablet (5 mg) by mouth daily  Qty: 90 tablet, Refills: 3    Associated Diagnoses: Kidney replaced by transplant      simethicone (MYLICON) 125 MG CHEW Take 1 " tablet (125 mg) by mouth as needed for intestinal gas  Qty: 120 tablet      tacrolimus (GENERIC EQUIVALENT) 1 MG capsule Take 2 capsules (2 mg) by mouth 2 times daily  Qty: 120 capsule, Refills: 11    Associated Diagnoses: Kidney replaced by transplant      VITAMIN D, CHOLECALCIFEROL, PO Take 4,000 Units by mouth 2 times daily       !! - Potential duplicate medications found. Please discuss with provider.        Allergies   Allergies   Allergen Reactions     Atorvastatin Calcium      myalgias, muscle aches     Codeine Nausea and Vomiting     Darvocet [Propoxyphene N-Apap] Nausea and Vomiting     Hydromorphone      Levofloxacin Other (See Comments) and Diarrhea     hallucinations     Morphine      Nausea and vomiting     Niaspan [Niacin] GI Disturbance     Flushing even at low dose, GI upset     Percocet [Oxycodone-Acetaminophen]      Vomiting after taking med couple of times     Vicodin [Hydrocodone-Acetaminophen] Nausea and Vomiting     Data   Most Recent 3 CBC's:  Recent Labs   Lab Test 06/06/19  0707 06/04/19  0739 06/03/19  0757 06/02/19  2028 05/10/19  1215   WBC  --  6.1  --  7.0 7.6   HGB  --  10.4*  --  10.8* 9.5*   MCV  --  94  --  93 92   * 154 159 177 168      Most Recent 3 BMP's:  Recent Labs   Lab Test 06/04/19  0739 06/02/19  2028 05/10/19  1215    142 139   POTASSIUM 4.3 4.9 4.6   CHLORIDE 115* 109 110*   CO2 21 27 23   BUN 34* 45* 53*   CR 1.57* 1.84* 1.94*   ANIONGAP 8 6 6   ROGER 8.6 8.9 8.3*   * 112* 125*     Most Recent 2 LFT's:  Recent Labs   Lab Test 06/04/19  0739 06/02/19 2028   AST 14 17   ALT 21 28   ALKPHOS 113 117   BILITOTAL 0.3 0.3     Most Recent INR's and Anticoagulation Dosing History:  Anticoagulation Dose History     Recent Dosing and Labs Latest Ref Rng & Units 9/1/2016 9/2/2016 3/8/2017 4/26/2017 4/26/2017 11/6/2017 2/28/2019    INR 0.86 - 1.14 1.13 1.09 1.01 1.06 1.03 1.09 1.09        Most Recent 3 Troponin's:  Recent Labs   Lab Test 06/02/19 2028  04/24/19  1258 03/01/19  1119  02/28/19  0018   TROPI <0.015 <0.015 <0.015   < >  --    TROPONIN  --   --   --   --  0.00    < > = values in this interval not displayed.     Most Recent Cholesterol Panel:  Recent Labs   Lab Test 12/05/16  1210   CHOL 164   LDL 64   HDL 46*   TRIG 272*     Most Recent 6 Bacteria Isolates From Any Culture (See EPIC Reports for Culture Details):  Recent Labs   Lab Test 06/04/19  0738 06/02/19 2122 06/02/19 2111 06/02/19  2028 05/10/19  1205 04/24/19  1405   CULT No growth after 3 days >100,000 colonies/mL  Citrobacter freundii complex  *  >100,000 colonies/mL  Staphylococcus aureus  * Cultured on the 1st day of incubation:  Staphylococcus capitis  This isolate DOES NOT demonstrate inducible clindamycin resistance in vitro. Clindamycin   is susceptible and could be used when indicated, however, erythromycin is resistant and   should not be used.  *  Critical Value/Significant Value, preliminary result only, called to and read back by  Jyoti Brower RN on 06.03.2019 at 2151. JRT    (Note)  POSITIVE for Staphylococci other than S.aureus, S.epidermidis and  S.lugdunensis, by Verigene multiplex nucleic acid test.  Coagulase-negative staphylococci are the most common venipuncture or  collection associated skin CONTAMINANTS grown in blood cultures.  Final identification and antimicrobial susceptibility testing will be  verified by standard methods.    Specimen tested with Verigene multiplex, gram-positive blood culture  nucleic acid test for the following targets: Staph aureus, Staph  epidermidis, Staph lugdunensis, other Staph species, Enterococcus  faecalis, Enterococcus faecium, Streptococcus species, S. agalactiae,  S. anginosus grp., S. pneumoniae, S. pyogenes, Listeria sp., mecA  (methicillin resistance) and Viridiana/B (vancomycin resistance).    Critical Value/Significant Value called to and read back by  DENNIS RAMIREZ RN ([id number]).  06.04.19  0028 GJS   No growth after 4 days  >100,000 colonies/mL  Citrobacter freundii complex  * >100,000 colonies/mL  Citrobacter freundii complex  *     Most Recent TSH, T4 and A1c Labs:  Recent Labs   Lab Test 06/03/19  0730 04/24/19  1258  03/09/17  1310   TSH 1.28  --    < > 1.20   T4  --   --   --  0.72*   A1C  --  6.2*   < >  --     < > = values in this interval not displayed.       Results for orders placed or performed during the hospital encounter of 06/02/19   Head CT w/o contrast    Narrative    CT HEAD WITHOUT CONTRAST  6/2/2019 11:06 PM     HISTORY: Altered level of consciousness (LOC), unexplained. Patient is  not answering questions appropriately.    TECHNIQUE:  Axial images of the head and coronal reformations without  contrast. Radiation dose for this scan was reduced using automated  exposure control, adjustment of the mA and/or kV according to patient  size, or iterative reconstruction technique.    COMPARISON: 4/24/2019.    FINDINGS: Mild cortical atrophy. Mild patchy low-attenuation areas are  present in white matter of the cerebral hemispheres that are  nonspecific but consistent with chronic small vessel ischemic changes  in this age patient. No intracranial hemorrhage, mass or mass effect.   No acute infarct identified. No shift of midline structures. No skull  fracture.      Impression    IMPRESSION: No acute intracranial abnormality.    CITLALLI GOMEZ MD   CT Chest Abdomen Pelvis w/o Contrast    Narrative    CT CHEST/ABDOMEN/PELVIS WITHOUT CONTRAST 6/2/2019 11:08 PM     HISTORY: History of renal transplant; left low chest/left upper  abdominal tenderness to palpation.    TECHNIQUE: Volumetric acquisition through chest, abdomen and pelvis  without contrast.  Radiation dose for this scan was reduced using  automated exposure control, adjustment of the mA and/or kV according  to patient size, or iterative reconstruction technique.    COMPARISON: 3/5/2019.    FINDINGS:     Chest: Small area of mild hazy opacity in the left lower  lung which  could be atelectasis or pneumonia. Mild linear atelectasis or scarring  in the right lower lung. Normal heart size. Coronary artery and mitral  annular calcifications. Thoracic aortic calcification. No enlarged  mediastinal or hilar lymph nodes.    Small bilateral pleural effusions and bibasilar atelectasis seen  previously have resolved.    Abdomen and pelvis: Marked renal cortical atrophy. Well-circumscribed,  rounded slightly increased density lesion measuring 1.7 cm arising  from the right mid kidney anteriorly is stable. No hydronephrosis.  Gallbladder not seen. 2.9 cm cyst in the pancreatic body is stable.  The upper abdominal organs are otherwise negative without contrast.    Renal transplant in the right lower quadrant without hydronephrosis or  associated perirenal fluid collections. Johnson catheter decompressing  the urinary bladder making slight bladder wall thickening difficult to  exclude. Correlate with urinalysis. Uterus is present. No suspicious  adnexal masses. Scattered colonic diverticula. No bowel obstruction or  other acute findings.      Impression    IMPRESSION:  1. Mild hazy opacity in the left lower lung may be pneumonia.  2. No other acute findings.  3. Atrophic native kidneys with right lower quadrant renal transplant.  Stable right renal lesion which may be a hemorrhagic cyst or small  solid lesion.  4. Redemonstrated cystic lesion in the pancreatic body. This could be  an IPMN.    CITLALLI GOMEZ MD     *Note: Due to a large number of results and/or encounters for the requested time period, some results have not been displayed. A complete set of results can be found in Results Review.

## 2019-06-09 LAB
BACTERIA SPEC CULT: NO GROWTH
Lab: NORMAL
SPECIMEN SOURCE: NORMAL

## 2019-06-10 ENCOUNTER — TELEPHONE (OUTPATIENT)
Dept: FAMILY MEDICINE | Facility: CLINIC | Age: 74
End: 2019-06-10

## 2019-06-10 ENCOUNTER — APPOINTMENT (OUTPATIENT)
Dept: FAMILY MEDICINE | Facility: CLINIC | Age: 74
End: 2019-06-10
Payer: COMMERCIAL

## 2019-06-10 ENCOUNTER — DOCUMENTATION ONLY (OUTPATIENT)
Dept: FAMILY MEDICINE | Facility: CLINIC | Age: 74
End: 2019-06-10

## 2019-06-10 LAB
BACTERIA SPEC CULT: NO GROWTH
Lab: NORMAL
SPECIMEN SOURCE: NORMAL

## 2019-06-10 NOTE — PROGRESS NOTES
[facility] now requests orders and shares plan of care/discharge summaries for some patients through [employer].  Please REPLY TO THIS MESSAGE OR ROUTE BACK TO THE AUTHOR in order to give authorization for orders when needed.  This is considered a verbal order, you will still receive a faxed copy of orders for signature.  Thank you for your assistance in improving collaboration for our patients.    Dr. Iglesias and Marivel Barcenas    Gem was discharged from [facility] on 6/7/19.   A new Home Care episode was started due to the patient being hospitalized during the end of certification period    Requesting the following Home Care orders:    SN 1w1, 2w1, 1w2, 3 PRN  HHA 2w1, 3w3     Pt has a scabbed area on right great toe measuring 1.2cm x 1.8cm, area is currently YIMI, no drainage or s/s of infection r/t to area.     Discharge paperwork from the hospital recommends BMP & CBC in one week following hospitalization.   Home Care would be able to draw in home if ok with you.     Pt had catheter changed in hospital, do you want Home Care to change the patients catheter every 2 or 3 weeks?  Currently has 16Fr coude in, but patient uses 18Fr silver tipped catheter with 30ml balloon.  OK to irrigate with NS PRN.       Thank you,  London Potter RN Case Manager   [phone]  [email or web address]

## 2019-06-10 NOTE — PROGRESS NOTES
Ok for orders requested, change cath every 3 weeks     Klaudia MCFARLANEP          ROS      Physical Exam

## 2019-06-10 NOTE — PROGRESS NOTES
"Gem Jade is a 73 year old female who is being evaluated via a billable telephone visit.      The patient has been notified of following:     \"This telephone visit will be conducted via a call between you and your physician/provider. We have found that certain health care needs can be provided without the need for a physical exam.  This service lets us provide the care you need with a short phone conversation.  If a prescription is necessary we can send it directly to your pharmacy.  If lab work is needed we can place an order for that and you can then stop by our lab to have the test done at a later time.    If during the course of the call the physician/provider feels a telephone visit is not appropriate, you will not be charged for this service.\"     Consent has been obtained for this service by 1 care team member: yes. See the scanned image in the medical record.    Gem Jade complains of  No chief complaint on file.      I have reviewed and updated the patient's Past Medical History, Social History, Family History and Medication List.    ALLERGIES  Atorvastatin calcium; Codeine; Darvocet [propoxyphene n-apap]; Hydromorphone; Levofloxacin; Morphine; Niaspan [niacin]; Percocet [oxycodone-acetaminophen]; and Vicodin [hydrocodone-acetaminophen]    *** (MA signature)    Additional provider notes:    Hospital Follow-up Visit:    Hospital/Nursing Home/IP Rehab Facility: [facility]  Date of Admission: 6/2/19  Date of Discharge: 6/7/19  Reason(s) for Admission: Acute encephalopathy, secondary to catheter related urinary tract infection  Chronic indwelling Johnson  Likely acute delirium  Obstructive sleep apnea on CPAP   Type 2 diabetes with retinopathy, nephropathy, neuropathy including diabetic gastroparesis   End-stage renal disease status post historic right lower quadrant renal transplant:   Severe obesity  History of provoked bilateral PE:   Paroxysmal atrial fibrillation   History of " "seizure disorder:   Hypertension   Cystic 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  Follow up needed: {NONE DEFAULTED:942120::\"none\"}  Other Healthcare Providers Involved in Patient s Care:         {those currently involved after discharge:137004::\"None\"}  Update since discharge: {IMPROVED DEFAULT:441081::\"improved.\"} {include information from family, SNF, care coordination}    Post Discharge Medication Reconciliation: {ACO Med Rec (Provider):636214}.  Plan of care communicated with {Communicate Plan to:023810::\"patient\"}     Coding guidelines for this visit:  Type of Medical   Decision Making Face-to-Face Visit       within 7 Days of discharge Face-to-Face Visit        within 14 days of discharge   Moderate Complexity 45574 [id number]   High Complexity 65571 99216             {Chronic Problem SUPERLIST:746363}    Assessment/Plan:  {visit diagnosis:116694}    I have reviewed the note as documented above.  This accurately captures the substance of my conversation with the patient.  ***    Total time of call between patient and provider was *** minutes     {signature options:153618}      "

## 2019-06-12 ENCOUNTER — PATIENT OUTREACH (OUTPATIENT)
Dept: CARE COORDINATION | Facility: CLINIC | Age: 74
End: 2019-06-12

## 2019-06-12 NOTE — PROGRESS NOTES
Clinic Care Coordination Contact    Clinic Care Coordination Contact  OUTREACH    Referral Information:  Referral Source: Care Team(home care nurse)         Chief Complaint   Patient presents with     Clinic Care Coordination - Follow-up     RN        Universal Utilization: frequent hospital admissions and ED visits     Utilization    Last refreshed: 2019  7:12 PM:  Hospital Admissions 5           Last refreshed: 2019  7:12 PM:  ED Visits 0           Last refreshed: 2019  7:12 PM:  No Show Count (past year) 4              Current as of: 2019  7:12 PM              Clinical Concerns:  Current Medical Concerns:  Recently discharged from hospital for altered mental status r/t UTI  Current Behavioral Concerns: intermittently hallucinating. Thinks related to UTI and new environment during hospitalizations but thinks poor eye sight is contributing to problems at home. Dog  on  and she has seen the dog in the house 3 times since then. She sees her  all over the house even when he might be on the other end of the house or outside.     Education Provided to patient: AMS with UTI discussed      Health Maintenance Reviewed:    Clinical Pathway: None    Medication Management:  Taking medications as prescribed. Set up by home care nurse     Functional Status:  Bed bound. Cannot sit up in wheelchair due to dizziness. Uses christiana lift with assist of 2 if needing to leave the bed    Living Situation:   lives with  in home    Diet/Exercise/Sleep:  Unable to exercise. Noted recent urge to overeat due grief over dog that      Transportation:    with extreme difficulty due to morbid obesity and inability to ambulate on her own.       Psychosocial:  Lutheran or spiritual beliefs that impact treatment:: No     Financial/Insurance:      Not discussed     Resources and Interventions:  Current Resources:    ;   Community Resources: Other (see comment)(community paramedic)  Supplies used  at home:: Incontinence Supplies  Equipment Currently Used at Home: other (see comments)(christiana lift)         Referrals Placed: None           Patient/Caregiver understanding: good       Future Appointments              In 2 days Marivel Barcenas APRN CNP [facility], [facility]    In 2 weeks Conchita [place] [facility] [place]    In 1 month Marivel Barcenas APRN CNP [facility], [facility]          Plan: PCP to see patient in 2 days. Keep phone visit with MTM in 2 weeks. Follow up approximately one month, sooner with patient concerns. She agrees with plan.     Porsha Khalil RN  [facility]-Care Coordination  [facility]  [phone]

## 2019-06-13 LAB
ANION GAP SERPL CALCULATED.3IONS-SCNC: 5 MMOL/L (ref 3–14)
BASOPHILS # BLD AUTO: 0 10E9/L (ref 0–0.2)
BASOPHILS NFR BLD AUTO: 0.2 %
BUN SERPL-MCNC: 42 MG/DL (ref 7–30)
CALCIUM SERPL-MCNC: 7.9 MG/DL (ref 8.5–10.1)
CHLORIDE SERPL-SCNC: 110 MMOL/L (ref 94–109)
CO2 SERPL-SCNC: 26 MMOL/L (ref 20–32)
CREAT SERPL-MCNC: 1.61 MG/DL (ref 0.52–1.04)
DIFFERENTIAL METHOD BLD: ABNORMAL
EOSINOPHIL # BLD AUTO: 0.4 10E9/L (ref 0–0.7)
EOSINOPHIL NFR BLD AUTO: 4.4 %
ERYTHROCYTE [DISTWIDTH] IN BLOOD BY AUTOMATED COUNT: 15.1 % (ref 10–15)
GFR SERPL CREATININE-BSD FRML MDRD: 32 ML/MIN/{1.73_M2}
GLUCOSE SERPL-MCNC: 132 MG/DL (ref 70–99)
HCT VFR BLD AUTO: 30.5 % (ref 35–47)
HGB BLD-MCNC: 9.5 G/DL (ref 11.7–15.7)
IMM GRANULOCYTES # BLD: 0 10E9/L (ref 0–0.4)
IMM GRANULOCYTES NFR BLD: 0.5 %
LYMPHOCYTES # BLD AUTO: 1.7 10E9/L (ref 0.8–5.3)
LYMPHOCYTES NFR BLD AUTO: 20.7 %
MCH RBC QN AUTO: 29.3 PG (ref 26.5–33)
MCHC RBC AUTO-ENTMCNC: 31.1 G/DL (ref 31.5–36.5)
MCV RBC AUTO: 94 FL (ref 78–100)
MONOCYTES # BLD AUTO: 0.4 10E9/L (ref 0–1.3)
MONOCYTES NFR BLD AUTO: 5.3 %
NEUTROPHILS # BLD AUTO: 5.6 10E9/L (ref 1.6–8.3)
NEUTROPHILS NFR BLD AUTO: 68.9 %
NRBC # BLD AUTO: 0 10*3/UL
NRBC BLD AUTO-RTO: 0 /100
PLATELET # BLD AUTO: 170 10E9/L (ref 150–450)
POTASSIUM SERPL-SCNC: 4.9 MMOL/L (ref 3.4–5.3)
RBC # BLD AUTO: 3.24 10E12/L (ref 3.8–5.2)
SODIUM SERPL-SCNC: 141 MMOL/L (ref 133–144)
WBC # BLD AUTO: 8.2 10E9/L (ref 4–11)

## 2019-06-13 PROCEDURE — 85025 COMPLETE CBC W/AUTO DIFF WBC: CPT | Performed by: FAMILY MEDICINE

## 2019-06-13 PROCEDURE — 80048 BASIC METABOLIC PNL TOTAL CA: CPT | Performed by: FAMILY MEDICINE

## 2019-06-14 ENCOUNTER — OFFICE VISIT (OUTPATIENT)
Dept: FAMILY MEDICINE | Facility: CLINIC | Age: 74
End: 2019-06-14
Payer: MEDICARE

## 2019-06-14 VITALS
BODY MASS INDEX: 39.39 KG/M2 | DIASTOLIC BLOOD PRESSURE: 70 MMHG | SYSTOLIC BLOOD PRESSURE: 140 MMHG | WEIGHT: 251 LBS | HEIGHT: 67 IN

## 2019-06-14 DIAGNOSIS — Z79.4 TYPE 2 DIABETES MELLITUS WITH HYPERGLYCEMIA, WITH LONG-TERM CURRENT USE OF INSULIN (H): ICD-10-CM

## 2019-06-14 DIAGNOSIS — F22 PARANOIA (H): ICD-10-CM

## 2019-06-14 DIAGNOSIS — S90.414A ABRASION OF TOE OF RIGHT FOOT, INITIAL ENCOUNTER: ICD-10-CM

## 2019-06-14 DIAGNOSIS — D63.1 ANEMIA DUE TO STAGE 3 CHRONIC KIDNEY DISEASE (H): ICD-10-CM

## 2019-06-14 DIAGNOSIS — E11.65 TYPE 2 DIABETES MELLITUS WITH HYPERGLYCEMIA, WITH LONG-TERM CURRENT USE OF INSULIN (H): ICD-10-CM

## 2019-06-14 DIAGNOSIS — F51.5 NIGHTMARES: ICD-10-CM

## 2019-06-14 DIAGNOSIS — I10 BENIGN ESSENTIAL HYPERTENSION: ICD-10-CM

## 2019-06-14 DIAGNOSIS — N39.0 COMPLICATED UTI (URINARY TRACT INFECTION): ICD-10-CM

## 2019-06-14 DIAGNOSIS — N18.30 ANEMIA DUE TO STAGE 3 CHRONIC KIDNEY DISEASE (H): ICD-10-CM

## 2019-06-14 DIAGNOSIS — N18.30 CKD (CHRONIC KIDNEY DISEASE) STAGE 3, GFR 30-59 ML/MIN (H): ICD-10-CM

## 2019-06-14 DIAGNOSIS — Z09 HOSPITAL DISCHARGE FOLLOW-UP: Primary | ICD-10-CM

## 2019-06-14 PROCEDURE — 99215 OFFICE O/P EST HI 40 MIN: CPT | Performed by: NURSE PRACTITIONER

## 2019-06-14 RX ORDER — PRAZOSIN HYDROCHLORIDE 1 MG/1
2 CAPSULE ORAL AT BEDTIME
Qty: 60 CAPSULE | Refills: 1 | Status: SHIPPED | OUTPATIENT
Start: 2019-06-14 | End: 2019-01-01

## 2019-06-14 RX ORDER — LABETALOL 300 MG/1
150 TABLET, FILM COATED ORAL 2 TIMES DAILY
Qty: 60 TABLET | Refills: 1 | Status: ON HOLD | COMMUNITY
Start: 2019-06-14 | End: 2019-01-01

## 2019-06-14 ASSESSMENT — MIFFLIN-ST. JEOR: SCORE: 1681.16

## 2019-06-14 NOTE — PATIENT INSTRUCTIONS
Please try and increase your iron intake    Please stay on your 150mg labetalol 2x daily    We have increased your prazosin    Try Epson salt soaks for your foot, right great toe     Patient Education     Iron-Deficiency Anemia (Adult)  Red blood cells carry oxygen to the tissues of your body. Anemia is a condition in which you have too few red blood cells. You need iron to make red cells. Anemia makes you feel tired and run down. When anemia becomes severe, your skin becomes pale. You may feel short of breath after physical activity. Other symptoms include:    Headaches    Dizziness    Leg cramps with physical activity    Drowsiness    Restless legs  Your anemia is caused by not having enough iron in your body. This may be because of:    Loss of blood. This can be caused by heavy menstrual periods. It can also be caused by bleeding from the stomach or intestines.    Poor diet. You may not be eating enough foods that contain iron.    Inability to absorb iron from the foods you eat    Pregnancy  If your blood count is low enough, your healthcare provider may prescribe an iron supplement. It usually takes about 2 to 3 months of treatment with iron supplements to correct anemia. Severe cases of anemia need a blood transfusion to quickly ease symptoms and deliver more oxygen to the cells.  Home care  Follow these guidelines when caring for yourself at home:    Eat foods high in iron. This will boost the amount of iron stored in your body. It is a natural way to build up the number of blood cells. Good sources of iron include beef, liver, spinach and other dark green leafy vegetables, whole grains, beans, and nuts.    Don't overexert yourself.    Talk with your healthcare provider before traveling by air or traveling to high altitudes.  Follow-up care  Follow up with your healthcare provider in 2 months, or as advised. This is to have another red blood cell count to be sure your anemia has been fixed.  Call 911  Call 911  or seek immediate medical care if any of these occur:    Shortness of breath or chest pain    Dizziness or fainting    Vomiting blood or passing red or black-colored stool   Date Last Reviewed: 3/1/2018    9906-6292 [employer]. [address] [place] [address], [place] [address]. All rights reserved. This information is not intended as a substitute for professional medical care. Always follow your healthcare professional's instructions.

## 2019-06-14 NOTE — Clinical Note
Conchita can you call her to follow up on her DM seems stable Nurses please contact her [facility] RN and let her know of med changes

## 2019-06-14 NOTE — PROGRESS NOTES
SUBJECTIVE:                                                    Gem Jade is a 73 year old female who presents to clinic today for the following health issues:        Hospital Follow-up Visit:    Hospital/Nursing Home/IP Rehab Facility: [facility]  Date of Admission: 6/2/19  Date of Discharge: 6/7/19  Reason(s) for Admission: Acute encephalopathy, secondary to catheter related urinary tract infection            Problems taking medications regularly:  None       Medication changes since discharge: did not increase her labetalol to 200mg 2x daily and BP have been fine, nightmares have increased, discussed increasing Prazosin         Problems adhering to non-medication therapy:  None  Summary of hospitalization:  [facility] discharge summary reviewed  Diagnostic Tests/Treatments reviewed.  Follow up needed: none  Other Healthcare Providers Involved in Patient s Care:         Homecare  Update since discharge: improved. Neuropathy slightly worse so using her extra dose of gabapentin, sugars stable, BP stable, skilled nursing several times a week, only eating 1x daily now, still some confusion and increase in paranoia and night flores    Post Discharge Medication Reconciliation: discharge medications reconciled and changed, per note/orders (see AVS).  Plan of care communicated with patient and family     Coding guidelines for this visit:  Type of Medical   Decision Making Face-to-Face Visit       within 7 Days of discharge Face-to-Face Visit        within 14 days of discharge   Moderate Complexity [id number]   High Complexity [id number]           Hospital Course:     Gem Jade is a 73 year old female admitted on 6/2/2019. She presents to the emergency department with decreased level of alertness consistent with prior episodes of encephalopathy with urinary tract infections.     Acute encephalopathy, secondary to catheter related urinary tract infection  Chronic indwelling  Johnson  -History of frequent urinary tract infections associated with chronic indwelling Johnson catheter.  Most recent urine cultures 2019 and 5/10/2019 demonstrated greater than 100,000 colony-forming units of Citrobacter.  Has a history of VRE, MRSA, and ESBL.  Infectious disease was contacted in the emergency department by Dr. Cline and recommended ertapenem as well as a urine Gram stain to help with determining patient's antibiotic tailoring.  Note that patient has followed with infectious disease clinic in the past related to recurrent urinary tract infections with colonization.  Patient on multiple sedating medications including baclofen, gabapentin.   -Continue indwelling Johnson catheter for retention; Catheter exchanged in the emergency department  -Urine culture growing Citrobacter and staph aureus  -Blood culture positive for staph capitis, more than likely a contaminant, repeat blood culture negative.  -Received 4 days of IV Invanz in the hospital.  -Discharged with Cefdinir 300 mg p.o. twice daily for 3 days more     Likely acute delirium  -Patient having intermittent issues with hallucination  -, Chang mentioned that this is not uncommon for her whenever she has encephalopathy related to UTI, this was improving.  It was felt appropriate to discharge her home.     Obstructive sleep apnea on CPAP:   -CPAP as per home settings     Possible left lower lobe pneumonia: Patient with no cough, no fevers, no shaking chills.   -PNA ruled out     Type 2 diabetes with retinopathy, nephropathy, neuropathy including diabetic gastroparesis   -New prior to admission regimen     End-stage renal disease status post historic right lower quadrant renal transplant:   -Renal transplant in .  Uncertain if this was living or  donor.  -Continue prednisone 5 mg daily at this time;   -Continue tacrolimus  -Renal function currently appears to be around baseline     Severe obesity:   -Increased risk of all  cause mortality  -Outpatient follow-up.     History of provoked bilateral PE:   -not on anticoags     Paroxysmal atrial fibrillation: Note 5/27/2016 EKG demonstrating A. fib with RVR in the setting of neurovirus with diarrhea.  Underwent 48-hour Holter monitoring in May 2016 demonstrating sinus rhythm with occasional supraventricular tachycardia.  No atrial fibrillation reported on that study.  Currently in sinus rhythm.  At time of event in 2016, recognition was for discussion with primary care provider regarding risk versus benefit of anticoagulation.  I do not believe patient has had any recurrent events of atrial fibrillation since, though all EKGs were not reviewed since this timeframe.  [id number] score of 3 for age, female sex, diabetes.  -Defer further discussion of anticoagulation to primary care provider.       History of seizure disorder:   -Keppra level therapeutic, encephalopathy much improved.  -Continue PTA keppra     Hypertension:  -Continue prior to admission amlodipine 10 milligrams daily     Cystic finding in the pancreatic body.  Incidental on extensive imaging in the emergency department.  Has been noted previously as well.  Question of intraductal papillary mucinous neoplasm.  Patient follows with Dr. Chen of gastroenterology, largely for her gastroparesis.  -Defer any further follow-up to outpatient gastroenterology.      Hypothyroidism:  -Continue prior to admission levothyroxine 50 mcg daily   -TSH within normal limits.     Diabetes Follow-up      How often are you checking your blood sugar? 3-4x daily, sometimes only eating one meal daily     What time of day are you checking your blood sugars (select all that apply)?  Before and after meals and At bedtime    Have you had any blood sugars above 200?  Yes a few at night time before dinner     Have you had any blood sugars below 70?  No    What symptoms do you notice when your blood sugar is low?  Shaky and Lethargy    What concerns do you  have today about your diabetes? Getting infections often     Do you have any of these symptoms? (Select all that apply)  Numbness in feet and Burning in feet     Have you had a diabetic eye exam in the last 12 months? No    BP Readings from Last 2 Encounters:   06/14/19 140/70   06/07/19 158/53     Hemoglobin A1C (%)   Date Value   04/24/2019 6.2 (H)   02/28/2019 7.3 (H)     LDL Cholesterol Calculated (mg/dL)   Date Value   12/05/2016 64   02/05/2015 18       Diabetes Management Resources  Hypertension Follow-up      Do you check your blood pressure regularly outside of the clinic? Yes     Are you following a low salt diet? Yes    Are your blood pressures ever more than 140 on the top number (systolic) OR more   than 90 on the bottom number (diastolic), for example 140/90? Yes if takes it with her wrist cuff, if uses arm cuff then no           Mental Health Follow up/anxiety and paranoia    Status since last visit: increased since being home from the hospital, more paranoia, some hallucinations at night and she is starting get scared of them,  fearful people will break into her home and hurt her, wants her  to blockade the door ,  Prazosin initially helped but now not so much npw     See PHQ-9 for current symptoms.  Other associated symptoms: None    Complicating factors: chronic Kidney diease   Significant life event:  No   Current substance abuse:  None  Anxiety or Panic symptoms:  No    Sleep - bad due to the nightmares, if wakes can not go back to sleep   Appetite - ok eating 2 meals a day trying to do diabetic diet  Exercise - none     Smoking - no  Alcohol - no  Street drugs - no  Marijuana - no  Caffeine - rare     PHQ-9  English PHQ-9   Any Language           Sore/right great toe  Duration of complaint: unsure how she got a scab on it denies injury, no pain, no swelling, slight redness, doing nothing for it       Problems taking medications regularly: No    Medication side effects: none    Diet:  diabetic    Social History     Tobacco Use     Smoking status: Former Smoker     Years: 17.00     Last attempt to quit: 1987     Years since quittin.0     Smokeless tobacco: Never Used   Substance Use Topics     Alcohol use: No        Problem list and histories reviewed & adjusted, as indicated.  Additional history: as documented    Patient Active Problem List   Diagnosis     Kidney replaced by transplant     Primary localized osteoarthrosis, lower leg     Pulmonary embolism and infarction (H)     Diabetes mellitus with background retinopathy (H)     HYPERLIPIDEMIA LDL GOAL <100     Diabetic gastroparesis (H)     Back pain     Compression fx, thoracic spine (H)     Hyperkalemia     Anemia     Clostridium difficile diarrhea     Hydronephrosis     Recurrent UTI     Benign essential hypertension     Hyponatremia     Vancomycin resistant Enterococcus     Clostridium difficile colitis     Diabetic polyneuropathy associated with type 2 diabetes mellitus (H)     Chronic shoulder pain, unspecified laterality     Morbid obesity, unspecified obesity type (H)     Health Care Home     Paroxysmal A-fib (H)     Acute kidney injury (H)     Pancytopenia, acquired (H)     Obstructive sleep apnea syndrome     Complicated UTI (urinary tract infection)     Altered Mental Status, Probable Metabolic encephalopathy     Chronic indwelling Johnson catheter     Seizure disorder (H)     Dermatitis     Encephalopathy acute     Type 2 diabetes mellitus with diabetic polyneuropathy, with long-term current use of insulin (H)     Sepsis (H)     Overdose, accidental or unintentional, subsequent encounter     UTI (urinary tract infection)     Past Surgical History:   Procedure Laterality Date     C NONSPECIFIC PROCEDURE  10/99    kidney transplant     C NONSPECIFIC PROCEDURE      MRI head, lumbar spine, pelvis     C TOTAL KNEE ARTHROPLASTY  3/03    Knee Replacement, Total right, post op PE     CHOLECYSTECTOMY       HC LEFT HEART  CATHETERIZATION      Cardiac Cath, Left Heart, Negative  (@ [facility])     HC LEFT HEART CATHETERIZATION      normal coronary angiogram at [facility], had mild troponin rise (0.71)     HC REMV CATARACT EXTRACAP,INSERT LENS      bilateral cataract repaired, left eye        Social History     Tobacco Use     Smoking status: Former Smoker     Years: 17.00     Last attempt to quit: 1987     Years since quittin.0     Smokeless tobacco: Never Used   Substance Use Topics     Alcohol use: No     Family History   Problem Relation Age of Onset     Diabetes Mother      Parkinsonism Mother      Diabetes Brother      Hypertension Father         [id number] pneumonia     Heart Disease Father            Current Outpatient Medications   Medication Sig Dispense Refill     ACETAMINOPHEN PO Take 325-650 mg by mouth every 4 hours as needed.       amLODIPine (NORVASC) 5 MG tablet Take 2 tablets (10 mg) by mouth daily 60 tablet 3     aspirin 81 MG tablet Take 81 mg by mouth daily       baclofen (LIORESAL) 10 MG tablet Take 1 tablet (10 mg) by mouth 2 times daily as needed for other (Bladder Spasms) 60 tablet 3     blood glucose (ACCU-CHEK COMPACT PLUS) test strip USE TO TEST BLOOD SUGAR THREE TIMES A  each 11     cephALEXin (KEFLEX) 500 MG capsule Take 1 capsule (500 mg) by mouth 3 times daily 9 capsule 0     ciprofloxacin (CIPRO) 250 MG tablet Take 1 tablet (250 mg) by mouth 2 times daily 6 tablet 0     COMFORT EZ PEN NEEDLES 31G X 6 MM miscellaneous USE 4 DAILY OR AS DIRECTED 100 each 3     Cranberry 400 MG TABS Take 400 mg by mouth 2 times daily       cyanocobalamin (VITAMIN B12) 1000 MCG/ML injection Inject 1 mL (1,000 mcg) into the muscle every 30 days 3 mL 3     diphenoxylate-atropine (LOMOTIL) 2.5-0.025 MG tablet Take 1-2 tablets by mouth 4 times daily as needed for diarrhea 90 tablet 0     famotidine (PEPCID) 20 MG tablet Take 1 tablet (20 mg) by mouth 2 times daily 60 tablet 1     gabapentin (NEURONTIN) 300 MG  "capsule Take 2 capsules (600 mg) by mouth 2 times daily May take an additional 300mg at bedtime daily with increase pain 360 capsule 3     insulin aspart (NOVOLOG FLEXPEN) 100 UNIT/ML pen Take 10 units with meals twice daily with sliding scale. Max 40 units per day  Sliding scale:   140-199 2u  200-249 4u  250-299 6u  300-349 8u  350-399 10u  Over 400 12u 45 mL 3     insulin glargine (LANTUS SOLOSTAR PEN) 100 UNIT/ML pen Inject 30 Units Subcutaneous At Bedtime 45 mL 3     labetalol (NORMODYNE) 200 MG tablet Take 1 tablet (200 mg) by mouth 2 times daily 60 tablet 0     Lactobacillus Rhamnosus, GG, (CULTURELL) capsule Take 1 capsule by mouth 2 times daily 60 capsule 11     levETIRAcetam (KEPPRA) 750 MG tablet Take 1 tablet (750 mg) by mouth 2 times daily 60 tablet 2     levothyroxine (SYNTHROID/LEVOTHROID) 50 MCG tablet TAKE ONE TABLET BY MOUTH EVERY DAY 90 tablet 1     lidocaine (XYLOCAINE) 2 % topical gel Apply topically as needed for moderate pain 10 ML every 3 weeks 30 mL 11     loratadine (CLARITIN) 10 MG tablet Take 1 tablet (10 mg) by mouth daily 90 tablet 3     menthol-zinc oxide (CALMOSEPTINE) 0.44-20.625 % OINT ointment Apply topically 4 times daily as needed for skin protection       ondansetron (ZOFRAN ODT) 4 MG ODT tab Take 1 tablet (4 mg) by mouth daily as needed for nausea 30 minutes before getting up in your wheelchair 20 tablet 1     order for DME Equipment being ordered: Tegaderm 4x4   MICHELLE 6 months  Sacral wound 1 Box 3     order for DME Equipment being ordered: Angiologix gel-T cushion 20 inches wide & 18 inches deep    height 1.676 m (5' 6\"), weight 101.6 kg (223 lb 15.8 oz) 1 each 0     pravastatin (PRAVACHOL) 10 MG tablet Take 1 tablet (10 mg) by mouth daily 90 tablet 3     prazosin (MINIPRESS) 1 MG capsule Take 1 capsule (1 mg) by mouth At Bedtime 30 capsule 1     predniSONE (DELTASONE) 5 MG tablet Take 1 tablet (5 mg) by mouth daily 90 tablet 3     simethicone (MYLICON) 125 MG CHEW Take 1 " tablet (125 mg) by mouth as needed for intestinal gas 120 tablet      tacrolimus (GENERIC EQUIVALENT) 1 MG capsule Take 2 capsules (2 mg) by mouth 2 times daily 120 capsule 11     VITAMIN D, CHOLECALCIFEROL, PO Take 4,000 Units by mouth 2 times daily       Allergies   Allergen Reactions     Atorvastatin Calcium      myalgias, muscle aches     Codeine Nausea and Vomiting     Darvocet [Propoxyphene N-Apap] Nausea and Vomiting     Hydromorphone      Levofloxacin Other (See Comments) and Diarrhea     hallucinations     Morphine      Nausea and vomiting     Niaspan [Niacin] GI Disturbance     Flushing even at low dose, GI upset     Percocet [Oxycodone-Acetaminophen]      Vomiting after taking med couple of times     Vicodin [Hydrocodone-Acetaminophen] Nausea and Vomiting     Recent Labs   Lab Test 06/13/19  1440 06/04/19  0739 06/03/19  0730 06/02/19 2028  04/24/19  1258  02/28/19  0612  09/16/18  0751  05/29/18  0440  12/05/16  1210  02/05/15  1250  05/16/13  1240   A1C  --   --   --   --   --  6.2*  --  7.3*  --  8.2*  --   --    < > 7.4*   < > 6.9*   < > 7.2*   LDL  --   --   --   --   --   --   --   --   --   --   --   --   --  64  --  18  --  149*   HDL  --   --   --   --   --   --   --   --   --   --   --   --   --  46*  --  43*  --  42*   TRIG  --   --   --   --   --   --   --   --   --   --   --   --   --  272*  --  223*  --  262*   ALT  --  21  --  28  --  22  --   --    < >  --    < >  --    < >  --    < >  --    < >  --    CR 1.61* 1.57*  --  1.84*   < > 1.78*   < > 2.26*   < > 1.88*   < > 1.39*   < >  --    < > 0.67   < > 0.70   GFRESTIMATED 32* 32*  --  27*   < > 28*   < > 21*   < > 26*   < > 37*   < >  --    < > 87   < > 84   GFRESTBLACK 37* 38*  --  31*   < > 32*   < > 24*   < > 32*   < > 45*   < >  --    < > >90   GFR Calc     < > >90   POTASSIUM 4.9 4.3  --  4.9   < > 4.5   < > 5.5*   < > 4.2   < > 4.0   < >  --    < > 4.3   < > 3.9   TSH  --   --  1.28  --   --   --   --   --   --   --   " --  0.85   < >  --    < >  --    < >  --     < > = values in this interval not displayed.      BP Readings from Last 3 Encounters:   06/07/19 158/53   05/03/19 150/50   04/27/19 157/57    Wt Readings from Last 3 Encounters:   06/07/19 113.7 kg (250 lb 10.6 oz)   04/27/19 107.8 kg (237 lb 10.5 oz)   03/03/19 113 kg (249 lb 1.9 oz)        Labs reviewed in EPIC  Problem list, Medication list, Allergies, and Medical/Social/Surgical histories [facility] and updated as appropriate.     ROS: Constitutional, neuro, ENT, endocrine, pulmonary, cardiac, gastrointestinal, genitourinary, musculoskeletal, integument and psychiatric systems are negative, except as otherwise noted above in the HPI.       OBJECTIVE:                                                    /70   Ht 1.702 m (5' 7\")   Wt 113.9 kg (251 lb)   BMI 39.31 kg/m    There is no height or weight on file to calculate BMI.    GENERAL: alert, mild distress, over weight, fatigued and laying in bed   RESP: lungs clear to auscultation - no rales, rhonchi or wheezes  CV: regular rate and rhythm, normal S1 S2, no S3 or S4, no murmur, click or rub, edema LE   ABDOMEN: soft, nontender,  and bowel sounds normal  SKIN: no suspicious lesions or rashes and large scab on right great toe joint, erythremic boarders, no discharge, no swelling   PSYCH: mentation appears normal, confused, affect normal/bright, anxious and judgement and insight impaired    Mental Status Assessment:  Appearance:   Appropriate   Eye Contact:   Fair   Psychomotor Behavior: Normal   Attitude:   Cooperative   Orientation:   All  Speech   Rate / Production: Normal    Volume:  Normal   Mood:    Anxious  Normal  Affect:    Appropriate  Labile   Thought Content:  Paranoia  Rumination   Thought Form:  Coherent  Flight of Ideas  Tangential   Insight:    Fair   Attention Span and Concentration:  limited  Recent and Remote Memory:  intact  Fund of Knowledge: appropriate  Muscle Strength and Tone: " normal   Suicidal Ideation: reports no thoughts, no intention  Hallucination: Yes  Paranoid-Yes  Manic-No  Panic-No  Self harm-No      Diagnostic Test Results:  Results for orders placed or performed in visit on 06/13/19   Basic metabolic panel   Result Value Ref Range    Sodium 141 133 - 144 mmol/L    Potassium 4.9 3.4 - 5.3 mmol/L    Chloride 110 (H) 94 - 109 mmol/L    Carbon Dioxide 26 20 - 32 mmol/L    Anion Gap 5 3 - 14 mmol/L    Glucose 132 (H) 70 - 99 mg/dL    Urea Nitrogen 42 (H) 7 - 30 mg/dL    Creatinine 1.61 (H) 0.52 - 1.04 mg/dL    GFR Estimate 32 (L) >60 mL/min/[1.73_m2]    GFR Estimate If Black 37 (L) >60 mL/min/[1.73_m2]    Calcium 7.9 (L) 8.5 - 10.1 mg/dL   CBC with platelets differential   Result Value Ref Range    WBC 8.2 4.0 - 11.0 10e9/L    RBC Count 3.24 (L) 3.8 - 5.2 10e12/L    Hemoglobin 9.5 (L) 11.7 - 15.7 g/dL    Hematocrit 30.5 (L) 35.0 - 47.0 %    MCV 94 78 - 100 fl    MCH 29.3 26.5 - 33.0 pg    MCHC 31.1 (L) 31.5 - 36.5 g/dL    RDW 15.1 (H) 10.0 - 15.0 %    Platelet Count 170 150 - 450 10e9/L    Diff Method Automated Method     % Neutrophils 68.9 %    % Lymphocytes 20.7 %    % Monocytes 5.3 %    % Eosinophils 4.4 %    % Basophils 0.2 %    % Immature Granulocytes 0.5 %    Nucleated RBCs 0 0 /100    Absolute Neutrophil 5.6 1.6 - 8.3 10e9/L    Absolute Lymphocytes 1.7 0.8 - 5.3 10e9/L    Absolute Monocytes 0.4 0.0 - 1.3 10e9/L    Absolute Eosinophils 0.4 0.0 - 0.7 10e9/L    Absolute Basophils 0.0 0.0 - 0.2 10e9/L    Abs Immature Granulocytes 0.0 0 - 0.4 10e9/L    Absolute Nucleated RBC 0.0      *Note: Due to a large number of results and/or encounters for the requested time period, some results have not been displayed. A complete set of results can be found in Results Review.        ASSESSMENT/PLAN:                                                    ASSESSMENT / PLAN:  (Z09) Hospital discharge follow-up  (primary encounter diagnosis)  Comment: improved, no UTI sx, some elevated sugars and  BP  Plan: discussed maintaining current BP meds, take BP with arm cuff not wrist, will have PharmD review insulin doses, maintain current doses     (N18.3) CKD (chronic kidney disease) stage 3, GFR 30-59 ml/min (H)  Comment: improved, kidney transplant MD following, last Hgb low   Plan: discussed high iron foods     (E11.65,  Z79.4) Type 2 diabetes mellitus with hyperglycemia, with long-term current use of insulin (H)  Comment: stable  Plan: continue current dose, follow up with PharmD     (N39.0) Complicated UTI (urinary tract infection)  Comment: improving, finished AB, urine clear   Plan: monitor     (F51.5) Nightmares  Comment: worse   Plan: prazosin (MINIPRESS) 1 MG capsule        Increase to 2mg at bedtime     (N18.3,  D63.1) Anemia due to stage 3 chronic kidney disease (H)  Comment: chronic,last lab 9.5  Plan: CBC with platelets and differential        Repeat lab in 2 weeks     (I10) Benign essential hypertension  Comment: stable   Plan: labetalol (NORMODYNE) 300 MG tablet        Maintain per hospital dosing     (F22) Paranoia (H)  Comment: worse   Plan: discussed plan, increase prazosin     Right great toe sore- epson salt soaks daily, call if signs of infection     Patient Instructions   Please try and increase your iron intake    Please stay on your 150mg labetalol 2x daily    We have increased your prazosin    Try Epson salt soaks for your foot, right great toe     Patient Education     Iron-Deficiency Anemia (Adult)  Red blood cells carry oxygen to the tissues of your body. Anemia is a condition in which you have too few red blood cells. You need iron to make red cells. Anemia makes you feel tired and run down. When anemia becomes severe, your skin becomes pale. You may feel short of breath after physical activity. Other symptoms include:    Headaches    Dizziness    Leg cramps with physical activity    Drowsiness    Restless legs  Your anemia is caused by not having enough iron in your body. This may be  because of:    Loss of blood. This can be caused by heavy menstrual periods. It can also be caused by bleeding from the stomach or intestines.    Poor diet. You may not be eating enough foods that contain iron.    Inability to absorb iron from the foods you eat    Pregnancy  If your blood count is low enough, your healthcare provider may prescribe an iron supplement. It usually takes about 2 to 3 months of treatment with iron supplements to correct anemia. Severe cases of anemia need a blood transfusion to quickly ease symptoms and deliver more oxygen to the cells.  Home care  Follow these guidelines when caring for yourself at home:    Eat foods high in iron. This will boost the amount of iron stored in your body. It is a natural way to build up the number of blood cells. Good sources of iron include beef, liver, spinach and other dark green leafy vegetables, whole grains, beans, and nuts.    Don't overexert yourself.    Talk with your healthcare provider before traveling by air or traveling to high altitudes.  Follow-up care  Follow up with your healthcare provider in 2 months, or as advised. This is to have another red blood cell count to be sure your anemia has been fixed.  Call 911  Call 911 or seek immediate medical care if any of these occur:    Shortness of breath or chest pain    Dizziness or fainting    Vomiting blood or passing red or black-colored stool   Date Last Reviewed: 3/1/2018    [id number] [employer]. [address] [place] [id number]. All rights reserved. This information is not intended as a substitute for professional medical care. Always follow your healthcare professional's instructions.        Visit time 60   min with > than 50% of the time spent in counseling on: hospital follow up, DM, MH issues, wound, HTN       Marivel Barcenas, NP, APRN CNP  [facility] PR[facility]

## 2019-06-17 NOTE — PROGRESS NOTES
LM on [facility]  Claudia [phone] with recent med changes on 6/7/19 & 6/14/19.   Marivel Klein RN

## 2019-06-18 ENCOUNTER — TELEPHONE (OUTPATIENT)
Dept: FAMILY MEDICINE | Facility: CLINIC | Age: 74
End: 2019-06-18

## 2019-06-18 ENCOUNTER — MEDICAL CORRESPONDENCE (OUTPATIENT)
Dept: HEALTH INFORMATION MANAGEMENT | Facility: CLINIC | Age: 74
End: 2019-06-18

## 2019-06-18 DIAGNOSIS — N39.0 RECURRENT UTI: Primary | ICD-10-CM

## 2019-06-18 LAB
BASOPHILS # BLD AUTO: 0 10E9/L (ref 0–0.2)
BASOPHILS NFR BLD AUTO: 0.4 %
DIFFERENTIAL METHOD BLD: ABNORMAL
EOSINOPHIL # BLD AUTO: 0.2 10E9/L (ref 0–0.7)
EOSINOPHIL NFR BLD AUTO: 3.3 %
ERYTHROCYTE [DISTWIDTH] IN BLOOD BY AUTOMATED COUNT: 15.2 % (ref 10–15)
HCT VFR BLD AUTO: 29.9 % (ref 35–47)
HGB BLD-MCNC: 9.3 G/DL (ref 11.7–15.7)
IMM GRANULOCYTES # BLD: 0 10E9/L (ref 0–0.4)
IMM GRANULOCYTES NFR BLD: 0.4 %
LYMPHOCYTES # BLD AUTO: 1.4 10E9/L (ref 0.8–5.3)
LYMPHOCYTES NFR BLD AUTO: 24.9 %
MCH RBC QN AUTO: 29.2 PG (ref 26.5–33)
MCHC RBC AUTO-ENTMCNC: 31.1 G/DL (ref 31.5–36.5)
MCV RBC AUTO: 94 FL (ref 78–100)
MONOCYTES # BLD AUTO: 0.5 10E9/L (ref 0–1.3)
MONOCYTES NFR BLD AUTO: 8.7 %
NEUTROPHILS # BLD AUTO: 3.4 10E9/L (ref 1.6–8.3)
NEUTROPHILS NFR BLD AUTO: 62.3 %
NRBC # BLD AUTO: 0 10*3/UL
NRBC BLD AUTO-RTO: 0 /100
PLATELET # BLD AUTO: 148 10E9/L (ref 150–450)
PROCALCITONIN SERPL-MCNC: <0.05 NG/ML
RBC # BLD AUTO: 3.19 10E12/L (ref 3.8–5.2)
VIT B12 SERPL-MCNC: 801 PG/ML (ref 193–986)
WBC # BLD AUTO: 5.5 10E9/L (ref 4–11)

## 2019-06-18 PROCEDURE — 84145 PROCALCITONIN (PCT): CPT | Performed by: FAMILY MEDICINE

## 2019-06-18 PROCEDURE — 82607 VITAMIN B-12: CPT | Performed by: FAMILY MEDICINE

## 2019-06-18 PROCEDURE — 85025 COMPLETE CBC W/AUTO DIFF WBC: CPT | Performed by: FAMILY MEDICINE

## 2019-06-18 NOTE — TELEPHONE ENCOUNTER
TC from [facility] RN, patient is fatigued, vitals stable, missed some of her meds due to sleeping, recent increase in her minipress, some sediment in urine, discussed repeating her CBC today and procalcitonin ordered     Klaudia MTZ

## 2019-06-19 NOTE — TELEPHONE ENCOUNTER
Keshawn called to provide update on patient's condition.  Shantelle still sleeping but able to take last 3 sets of pills.  Responding verbally to question most of the time (80%).  Caller stated he will continue to wake her for beds but that he feels it is okay for her to sleep.    Umm Tobar

## 2019-06-20 NOTE — TELEPHONE ENCOUNTER
Labs reviewed, no indication of new infection, continue to monitor and call  tomorrow to check on patient's condition.    Klaudia MTZ

## 2019-06-24 DIAGNOSIS — R19.7 DIARRHEA, UNSPECIFIED TYPE: ICD-10-CM

## 2019-06-24 RX ORDER — DIPHENOXYLATE HCL/ATROPINE 2.5-.025MG
1-2 TABLET ORAL 4 TIMES DAILY PRN
Qty: 90 TABLET | Refills: 0 | Status: CANCELLED | OUTPATIENT
Start: 2019-06-24

## 2019-06-24 NOTE — TELEPHONE ENCOUNTER
Pt wants 90ds of lomark    Thank you!  Conchita Mahoney CPhT  [facility]

## 2019-06-26 RX ORDER — DIPHENOXYLATE HCL/ATROPINE 2.5-.025MG
1-2 TABLET ORAL 4 TIMES DAILY PRN
Qty: 240 TABLET | Refills: 1 | Status: SHIPPED | OUTPATIENT
Start: 2019-06-26 | End: 2019-07-08

## 2019-06-27 ENCOUNTER — ALLIED HEALTH/NURSE VISIT (OUTPATIENT)
Dept: PHARMACY | Facility: CLINIC | Age: 74
End: 2019-06-27
Payer: COMMERCIAL

## 2019-06-27 DIAGNOSIS — E03.9 HYPOTHYROIDISM, UNSPECIFIED TYPE: ICD-10-CM

## 2019-06-27 DIAGNOSIS — E11.65 TYPE 2 DIABETES MELLITUS WITH HYPERGLYCEMIA, WITH LONG-TERM CURRENT USE OF INSULIN (H): Primary | ICD-10-CM

## 2019-06-27 DIAGNOSIS — F51.5 NIGHTMARES: ICD-10-CM

## 2019-06-27 DIAGNOSIS — E11.42 DIABETIC POLYNEUROPATHY ASSOCIATED WITH TYPE 2 DIABETES MELLITUS (H): ICD-10-CM

## 2019-06-27 DIAGNOSIS — D50.9 IRON DEFICIENCY ANEMIA, UNSPECIFIED IRON DEFICIENCY ANEMIA TYPE: ICD-10-CM

## 2019-06-27 DIAGNOSIS — I10 BENIGN ESSENTIAL HYPERTENSION: ICD-10-CM

## 2019-06-27 DIAGNOSIS — Z79.4 TYPE 2 DIABETES MELLITUS WITH HYPERGLYCEMIA, WITH LONG-TERM CURRENT USE OF INSULIN (H): Primary | ICD-10-CM

## 2019-06-27 PROCEDURE — 99606 MTMS BY PHARM EST 15 MIN: CPT | Performed by: PHARMACIST

## 2019-06-27 PROCEDURE — 99607 MTMS BY PHARM ADDL 15 MIN: CPT | Performed by: PHARMACIST

## 2019-06-27 RX ORDER — CYANOCOBALAMIN 1000 UG/ML
1000 INJECTION, SOLUTION INTRAMUSCULAR; SUBCUTANEOUS
Qty: 3 ML | Refills: 3 | Status: SHIPPED | OUTPATIENT
Start: 2019-06-27 | End: 2020-01-01

## 2019-06-27 NOTE — PROGRESS NOTES
SUBJECTIVE/OBJECTIVE:                Gem Jade is a 73 year old female called for a transitions of care visit.  She was discharged from [facility] on 6/7/19 for   Acute encephalopathy, secondary to catheter related urinary tract infection  Chronic indwelling Johnson  Likely acute delirium    Chief Complaint: feeling better, had been sleeping for 2 days but doing well today.    Medication Adherence/Access:  Patient has assistance with medication administration and setting up boxes (: Keshawn)   -Call Keshawn () with all medication changes (per 5/4/19 profile note from CNP)     Chronic indwelling Johnson Catheter: had issues with leakage this past week requiring two changes by HCRNs. Issue is now resolved.     Diabetes:  Pt currently taking Lantus 30 units at bedtime and Novolog sliding scale (has been forgetting to add 10 units base with meals for the past week). Uses sliding scale with breakfast and at bedtime. Pt is not experiencing side effects.   Sliding scale:   140-199 2u  200-249 4u  250-299 6u  300-349 8u  350-399 10u  Over 400 12u   SMBG: Self-monitored. Ranges (pt reported):   Date FBG/ 2hours post Lunch/2hours post Dinner /2hours post    6/23 163  /311    6/24 204  /211    6/25 238      6/27 140s                              Recent symptoms of low blood sugar? No  Recent symptoms of high blood sugar? No  Eye exam: due  Foot exam: due  ACEi/ARB: No due to history of elevated Cr   Aspirin: Taking 81mg daily and denies side effects  Urine Albumin:         Lab Results   Component Value Date     UMALCR 6776.56 (H) 04/26/2017      Lab Results   Component Value Date    A1C 6.2 04/24/2019    A1C 7.3 02/28/2019    A1C 8.2 09/16/2018    A1C 8.8 05/26/2018    A1C 6.3 01/12/2018   Diet: has decreased rice.    Hypertension: Currently taking amlodipine 10mg daily, labetalol 150mg twice daily, prazosin 1-2mg HS (for nightmares). No side effects reported or low BP readings.   -Blood pressures are being  "checked by HCRN. Stopped self monitoring per PCP. Unsure of the values but thinks they have been good.  BP Readings from Last 3 Encounters:   06/14/19 140/70   06/07/19 158/53   05/03/19 150/50     Diabetic Neuropathy: Currently taking Gabapentin 300mg 2 caps BID.  Trying to \"tough it out\" because doesn't want to take extra medication. Working well most of the time but some days has terrible neuropathy in her feet at night and hard to sleep.  -Pt was previously on amitriptyline 50mg daily and oxcarbazepine 600mg bid but discontinued due to multiple hospitalizations for encephalopathy.    Nightmares: Currently taking prazosin 1-2mg at bedtime. After feeling sick for the past 3 days, nightmares had worsened and had been feeling scared. Able to take prazosin last night which was helpful.     Anemia (Vitamin B12): Current medication include cyanobalamin 1000mcg/mL injection q30 days. Skipped her dose 6/1 and wondering when she should get it next. Pt is not experiencing side effects.   -Vitamin B12 levels have been high since 2/2017    - 6/18/19:  801 pg/mL     -2/17/17:  1,031pg/mL     -3/9/17:    1,402pg/mL      -5/29/18:  1,383pg/mL   Hemoglobin   Date Value Ref Range Status   06/18/2019 9.3 (L) 11.7 - 15.7 g/dL Final   ]     Thyroid: Current medication includes: levothyroxine 50mcg daily. Pt is not experiencing side effects  TSH 1.28   0.40 - 4.00 mU/L Final 06/03/2019  7:30 AM       Today's Vitals: There were no vitals taken for this visit. - phone visit    ASSESSMENT:                 Current medications were reviewed today.      Medication Adherence: excellent, no issues identified    Diabetes: improved. A1c at goal <8%. Will provide pt with a sliding scale INCLUDING base of 10 units for mealtime to reduce confusion.     Hypertension: improved per pt report. Continue HCRN monitoring. Room to increase labetalol if needed.    Neuropathy: needs improvement. Taking max gabapentin per current renal function. Consider " trial of otc lidocaine cream or patch.    Nightmares: improved.    Anemia: needs improvement, likely low hgb now 2/2 CKD.  B12 levels have been above the normal range, though most recent on high end of normal. OK to reduce to Q2 month dosing, unnecessary to continue monthly B12 supplementation.     Thyroid: stable. Last TSH wnl.     PLAN:                Post Discharge Medication Reconciliation Status: discharge medications reconciled, continue medications without change.    1. Mailed new sliding scale with the 10 units base dose included.  2. Try lidocaine cream or patch otc PRN neuropathic pain in feet  3. Decrease B12 IM to Q2 months    I spent 30 minutes with this patient today. All changes were made via collaborative practice agreement with Marivel Barcenas. A copy of the visit note was provided to the patient's primary care provider.    Will follow up in 1 month.    The patient was sent via [employer] a summary of these recommendations as an after visit summary.    Conchita Toledo, PharmD, BCACP

## 2019-07-01 ENCOUNTER — TELEPHONE (OUTPATIENT)
Dept: FAMILY MEDICINE | Facility: CLINIC | Age: 74
End: 2019-07-01

## 2019-07-01 DIAGNOSIS — Z79.4 TYPE 2 DIABETES MELLITUS WITH HYPERGLYCEMIA, WITH LONG-TERM CURRENT USE OF INSULIN (H): Primary | ICD-10-CM

## 2019-07-01 DIAGNOSIS — E11.65 TYPE 2 DIABETES MELLITUS WITH HYPERGLYCEMIA, WITH LONG-TERM CURRENT USE OF INSULIN (H): Primary | ICD-10-CM

## 2019-07-01 LAB
ALBUMIN UR-MCNC: 300 MG/DL
ANION GAP SERPL CALCULATED.3IONS-SCNC: 2 MMOL/L (ref 3–14)
APPEARANCE UR: ABNORMAL
BACTERIA #/AREA URNS HPF: ABNORMAL /HPF
BASOPHILS # BLD AUTO: 0 10E9/L (ref 0–0.2)
BASOPHILS NFR BLD AUTO: 0.1 %
BILIRUB UR QL STRIP: NEGATIVE
BUN SERPL-MCNC: 52 MG/DL (ref 7–30)
CALCIUM SERPL-MCNC: 8.4 MG/DL (ref 8.5–10.1)
CHLORIDE SERPL-SCNC: 111 MMOL/L (ref 94–109)
CO2 SERPL-SCNC: 25 MMOL/L (ref 20–32)
COLOR UR AUTO: ABNORMAL
CREAT SERPL-MCNC: 1.68 MG/DL (ref 0.52–1.04)
DIFFERENTIAL METHOD BLD: ABNORMAL
EOSINOPHIL # BLD AUTO: 0.3 10E9/L (ref 0–0.7)
EOSINOPHIL NFR BLD AUTO: 4.2 %
ERYTHROCYTE [DISTWIDTH] IN BLOOD BY AUTOMATED COUNT: 15.1 % (ref 10–15)
GFR SERPL CREATININE-BSD FRML MDRD: 30 ML/MIN/{1.73_M2}
GLUCOSE SERPL-MCNC: 127 MG/DL (ref 70–99)
GLUCOSE UR STRIP-MCNC: NEGATIVE MG/DL
HCT VFR BLD AUTO: 29.9 % (ref 35–47)
HGB BLD-MCNC: 9.5 G/DL (ref 11.7–15.7)
HGB UR QL STRIP: ABNORMAL
IMM GRANULOCYTES # BLD: 0 10E9/L (ref 0–0.4)
IMM GRANULOCYTES NFR BLD: 0.5 %
KETONES UR STRIP-MCNC: NEGATIVE MG/DL
LEUKOCYTE ESTERASE UR QL STRIP: ABNORMAL
LYMPHOCYTES # BLD AUTO: 1.8 10E9/L (ref 0.8–5.3)
LYMPHOCYTES NFR BLD AUTO: 23.6 %
MCH RBC QN AUTO: 29.6 PG (ref 26.5–33)
MCHC RBC AUTO-ENTMCNC: 31.8 G/DL (ref 31.5–36.5)
MCV RBC AUTO: 93 FL (ref 78–100)
MONOCYTES # BLD AUTO: 0.5 10E9/L (ref 0–1.3)
MONOCYTES NFR BLD AUTO: 6.6 %
MUCOUS THREADS #/AREA URNS LPF: PRESENT /LPF
NEUTROPHILS # BLD AUTO: 5 10E9/L (ref 1.6–8.3)
NEUTROPHILS NFR BLD AUTO: 65 %
NITRATE UR QL: POSITIVE
NRBC # BLD AUTO: 0 10*3/UL
NRBC BLD AUTO-RTO: 0 /100
PH UR STRIP: 6.5 PH (ref 5–7)
PLATELET # BLD AUTO: 173 10E9/L (ref 150–450)
POTASSIUM SERPL-SCNC: 4.5 MMOL/L (ref 3.4–5.3)
PROCALCITONIN SERPL-MCNC: <0.05 NG/ML
RBC # BLD AUTO: 3.21 10E12/L (ref 3.8–5.2)
RBC #/AREA URNS AUTO: 158 /HPF (ref 0–2)
SODIUM SERPL-SCNC: 138 MMOL/L (ref 133–144)
SOURCE: ABNORMAL
SP GR UR STRIP: 1.01 (ref 1–1.03)
T4 FREE SERPL-MCNC: 0.82 NG/DL (ref 0.76–1.46)
TSH SERPL DL<=0.005 MIU/L-ACNC: 2.43 MU/L (ref 0.4–4)
UROBILINOGEN UR STRIP-MCNC: NORMAL MG/DL (ref 0–2)
WBC # BLD AUTO: 7.6 10E9/L (ref 4–11)
WBC #/AREA URNS AUTO: >182 /HPF (ref 0–5)
WBC CLUMPS #/AREA URNS HPF: PRESENT /HPF
YEAST #/AREA URNS HPF: ABNORMAL /HPF

## 2019-07-01 PROCEDURE — 85025 COMPLETE CBC W/AUTO DIFF WBC: CPT | Performed by: FAMILY MEDICINE

## 2019-07-01 PROCEDURE — 84439 ASSAY OF FREE THYROXINE: CPT | Performed by: FAMILY MEDICINE

## 2019-07-01 PROCEDURE — 80048 BASIC METABOLIC PNL TOTAL CA: CPT | Performed by: FAMILY MEDICINE

## 2019-07-01 PROCEDURE — 84443 ASSAY THYROID STIM HORMONE: CPT | Performed by: FAMILY MEDICINE

## 2019-07-01 PROCEDURE — 81001 URINALYSIS AUTO W/SCOPE: CPT | Performed by: FAMILY MEDICINE

## 2019-07-01 PROCEDURE — 84145 PROCALCITONIN (PCT): CPT | Performed by: FAMILY MEDICINE

## 2019-07-01 PROCEDURE — 80197 ASSAY OF TACROLIMUS: CPT | Performed by: FAMILY MEDICINE

## 2019-07-01 NOTE — TELEPHONE ENCOUNTER
[facility] called to check on 90 day supply request.    Tel: [phone]    Delma Flores  [facility]

## 2019-07-01 NOTE — TELEPHONE ENCOUNTER
Reason for Call:  Home Health Care & Clarification    Adriana with [facility] called regarding (reason for call): orders & clarification    Orders are needed for this patient. Skilled Nursing & HHA- continuation orders starting 19    Skilled Nursinx/wk for 5 wks w/ 5 prn visits    HHA: 3x/wk for 4 wks & 2x/wk for 1 wk    Clarification:    1.) B12 frequency- pt reported it changed but not sure of exact change.    2.) Toe Wound (Right big toe)- Discuss toe wound & treatment of  right big toe    3.) Glucometer- Pt dropped her glucometer and is requesting a new rx for one if insurance will cover it.     Pt Provider:     Phone Number Homecare Nurse can be reached at: [phone]    Can we leave a detailed message on this number? YES    Phone number patient can be reached at: Home number on file [phone] (home)    Best Time: anytime    Call taken on 2019 at 4:18 PM by Delma Flores

## 2019-07-02 ENCOUNTER — TELEPHONE (OUTPATIENT)
Dept: FAMILY MEDICINE | Facility: CLINIC | Age: 74
End: 2019-07-02

## 2019-07-02 LAB
TACROLIMUS BLD-MCNC: 4.7 UG/L (ref 5–15)
TME LAST DOSE: ABNORMAL H

## 2019-07-02 NOTE — TELEPHONE ENCOUNTER
[facility] Call Center    Phone Message    May a detailed message be left on voicemail: no    Reason for Call: Order(s): Home Care Orders: Occupational Therapy (OT): Marivel called from [facility]. Would like for someone from pts. care team to PCP's RN at [phone] in regard to the labs completed on 7/1. Please advise.    Action Taken: Message routed to:  [facility] ([facility] Nephrology

## 2019-07-02 NOTE — TELEPHONE ENCOUNTER
Return call to [facility] - left message with verbal for requested orders      Outpatient Medication Detail      Disp Refills Start End BRODIE   cyanocobalamin (CYANOCOBALAMIN) 1000 MCG/ML injection 3 mL 3 6/27/2019  No   Sig - Route: Inject 1 mL (1,000 mcg) into the muscle every 2 months - Intramuscular       6/14/19 - status of right great toe - Duration of complaint: unsure how she got a scab on it denies injury, no pain, no swelling, slight redness, doing nothing for it     Plan: epson salt soaks daily, call if signs of infection     Ordered glucometer    Signed Prescriptions:                        Disp   Refills    blood glucose monitoring (NO BRAND SPECIFI*1 kit  0        Sig: Use to test blood sugar three times daily or as           directed.  Authorizing Provider: DEIRDRE FRASER  Ordering User: ARABELLA SHELLEY      Called [facility] and left message with additional information on these requests      Arabella Shelley RN

## 2019-07-02 NOTE — TELEPHONE ENCOUNTER
"Per PCP, labs drawn yesterday were not ordered by an [facility] provider, per HC RN Shantelle when I spoke with her,\" these are [facility] standing monthly orders\" per PCP there are no standing orders for this pt.   Call placed to [facility] to see if they ordered these labs will wait to hear from them.    Call placed to Shantelle  RN to let her know there are no standing order from [facility].  Marivel Klein RN  "

## 2019-07-03 ENCOUNTER — TELEPHONE (OUTPATIENT)
Dept: FAMILY MEDICINE | Facility: CLINIC | Age: 74
End: 2019-07-03

## 2019-07-03 DIAGNOSIS — Z94.0 KIDNEY TRANSPLANT RECIPIENT: Primary | ICD-10-CM

## 2019-07-03 DIAGNOSIS — D84.9 IMMUNOSUPPRESSED STATUS (H): ICD-10-CM

## 2019-07-03 DIAGNOSIS — Z79.60 LONG-TERM USE OF IMMUNOSUPPRESSANT MEDICATION: ICD-10-CM

## 2019-07-03 NOTE — TELEPHONE ENCOUNTER
Coleen from [facility] [phone] call stating she will meet with HC RN to see where these orders originated. & will RTC to clinic on 7/5/19.  Marivel Klein RN

## 2019-07-03 NOTE — TELEPHONE ENCOUNTER
Faxed order to [facility] - [phone]. Document sent to abstract for scanning.      Theodore Galindo MA

## 2019-07-03 NOTE — TELEPHONE ENCOUNTER
Spoke with Shabbir from Nephrology, the lab orders from 7/1 did not come from them, placed call [facility] to [place] office & asked that a supervisor look into the origin of the labs & RTC to clinic.  Marivel Klein RN

## 2019-07-05 NOTE — TELEPHONE ENCOUNTER
[facility]y from  [facility] called again to check on the status of the 90 day supply request.    Delma Flores  [facility]

## 2019-07-08 ENCOUNTER — TELEPHONE (OUTPATIENT)
Dept: FAMILY MEDICINE | Facility: CLINIC | Age: 74
End: 2019-07-08

## 2019-07-08 RX ORDER — DIPHENOXYLATE HCL/ATROPINE 2.5-.025MG
1-2 TABLET ORAL 4 TIMES DAILY PRN
Qty: 240 TABLET | Refills: 1 | Status: SHIPPED | OUTPATIENT
Start: 2019-07-08 | End: 2019-07-08

## 2019-07-08 NOTE — TELEPHONE ENCOUNTER
Call from pt's HC RN Adriana, wound on foot has, aprox a 1cm black spot with a surrounding area of redness aprox 2cm , there is no pus, some serosanguineous discharge.  Adriana will call the wound nurse out to see her this week. They have been using epsom salts  & bacitracin around the wound & loosely dressed. No fever, some pain with movement, currents meds are helpful.  Routing to POD   Marivel Klein RN

## 2019-07-08 NOTE — PATIENT INSTRUCTIONS
Recommendations from today's MTM visit:                                                        I mailed you a new sliding scale with the 10 base units for mealtime added in    Consider picking up lidocaine patch or cream over-the-counter to use on your feet at night for pain    We will decrease your B12 injections to every other month    Next MTM visit: I will call again in 1 month    To schedule another MTM appointment, please call the clinic directly or you may call the MTM scheduling line at [phone] or toll-free at [phone].     My Clinical Pharmacist's contact information:                                                      It was a pleasure talking with you today!  Please feel free to contact me with any questions or concerns you have.      Conchita Toledo, PharmD, [facility]   [phone]    You may receive a survey about the MTM services you received by email and/or US Mail.  I would appreciate your feedback to help me serve you better in the future. Your comments will be anonymous.

## 2019-07-08 NOTE — TELEPHONE ENCOUNTER
Pharmacy has called back. Family is asking for #360 for a 90 day supply, not #240 as written today.    Is this possible?       If so new rx queued up    Amita Irby RN, BSN

## 2019-07-08 NOTE — TELEPHONE ENCOUNTER
Faxed rx for #240 signed by Pat Tinajero to [facility]     Amita Irby, RN, BSN

## 2019-07-09 RX ORDER — DIPHENOXYLATE HCL/ATROPINE 2.5-.025MG
1-2 TABLET ORAL 4 TIMES DAILY PRN
Qty: 360 TABLET | Refills: 0 | Status: SHIPPED | OUTPATIENT
Start: 2019-07-09 | End: 2020-01-01

## 2019-07-10 ENCOUNTER — DOCUMENTATION ONLY (OUTPATIENT)
Dept: FAMILY MEDICINE | Facility: CLINIC | Age: 74
End: 2019-07-10

## 2019-07-10 NOTE — PROGRESS NOTES
[facility] now requests orders and shares plan of care/discharge summaries for some patients through [facility].  Please REPLY TO THIS MESSAGE OR ROUTE BACK TO THE AUTHOR in order to give authorization for orders when needed.  This is considered a verbal order, you will still receive a faxed copy of orders for signature.  Thank you for your assistance in improving collaboration for our patients.    ORDER    Marivel Barcenas NP & Dr. Stacey Iglesias,     I was out to see Gem today due to her catheter bypassing. She has had a couple of episodes of sediment buildup this week which has resulted in her bypassing urine.  I taught our HHA how to irrigate the catheter today to help benefit the patient however I was wondering if it would be appropriate to have her have the catheter irrigated 3/week.     Requesting the following order:     Irrigate the rico catheter 3/week and PRN beginning with 60cc of NS or sterile water, allow to drain, flush with another 60cc and repeat until clear.    Thank you,  London Potter RN Case Manager   [phone]  [email or web address]

## 2019-07-15 ENCOUNTER — APPOINTMENT (OUTPATIENT)
Dept: CT IMAGING | Facility: CLINIC | Age: 74
DRG: 617 | End: 2019-07-15
Attending: EMERGENCY MEDICINE
Payer: MEDICARE

## 2019-07-15 ENCOUNTER — HOSPITAL ENCOUNTER (INPATIENT)
Facility: CLINIC | Age: 74
LOS: 6 days | Discharge: HOME | DRG: 617 | End: 2019-07-21
Attending: EMERGENCY MEDICINE | Admitting: STUDENT IN AN ORGANIZED HEALTH CARE EDUCATION/TRAINING PROGRAM
Payer: MEDICARE

## 2019-07-15 ENCOUNTER — APPOINTMENT (OUTPATIENT)
Dept: GENERAL RADIOLOGY | Facility: CLINIC | Age: 74
DRG: 617 | End: 2019-07-15
Attending: EMERGENCY MEDICINE
Payer: MEDICARE

## 2019-07-15 DIAGNOSIS — T83.511A URINARY TRACT INFECTION ASSOCIATED WITH CATHETERIZATION OF URINARY TRACT, UNSPECIFIED INDWELLING URINARY CATHETER TYPE, INITIAL ENCOUNTER (H): ICD-10-CM

## 2019-07-15 DIAGNOSIS — G40.909 SEIZURE DISORDER (H): ICD-10-CM

## 2019-07-15 DIAGNOSIS — N39.0 URINARY TRACT INFECTION ASSOCIATED WITH CATHETERIZATION OF URINARY TRACT, UNSPECIFIED INDWELLING URINARY CATHETER TYPE, INITIAL ENCOUNTER (H): ICD-10-CM

## 2019-07-15 DIAGNOSIS — Z94.0 KIDNEY REPLACED BY TRANSPLANT: Primary | ICD-10-CM

## 2019-07-15 DIAGNOSIS — Z53.9 DIAGNOSIS NOT YET DEFINED: Primary | ICD-10-CM

## 2019-07-15 DIAGNOSIS — R41.82 ALTERED MENTAL STATUS, UNSPECIFIED ALTERED MENTAL STATUS TYPE: ICD-10-CM

## 2019-07-15 PROBLEM — E11.42 TYPE 2 DIABETES MELLITUS WITH DIABETIC POLYNEUROPATHY, WITH LONG-TERM CURRENT USE OF INSULIN (H): Status: ACTIVE | Noted: 2018-09-25

## 2019-07-15 PROBLEM — Z79.4 TYPE 2 DIABETES MELLITUS WITH DIABETIC POLYNEUROPATHY, WITH LONG-TERM CURRENT USE OF INSULIN (H): Status: ACTIVE | Noted: 2018-09-25

## 2019-07-15 LAB
ALBUMIN SERPL-MCNC: 3.1 G/DL (ref 3.4–5)
ALBUMIN UR-MCNC: 300 MG/DL
ALP SERPL-CCNC: 113 U/L (ref 40–150)
ALT SERPL W P-5'-P-CCNC: 18 U/L (ref 0–50)
ANION GAP SERPL CALCULATED.3IONS-SCNC: 9 MMOL/L (ref 3–14)
APPEARANCE UR: ABNORMAL
AST SERPL W P-5'-P-CCNC: 16 U/L (ref 0–45)
BASOPHILS # BLD AUTO: 0 10E9/L (ref 0–0.2)
BASOPHILS NFR BLD AUTO: 0.1 %
BILIRUB SERPL-MCNC: 0.3 MG/DL (ref 0.2–1.3)
BILIRUB UR QL STRIP: NEGATIVE
BUN SERPL-MCNC: 53 MG/DL (ref 7–30)
C DIFF TOX B STL QL: NEGATIVE
CALCIUM SERPL-MCNC: 9.3 MG/DL (ref 8.5–10.1)
CHLORIDE SERPL-SCNC: 109 MMOL/L (ref 94–109)
CO2 BLDCOV-SCNC: 22 MMOL/L (ref 21–28)
CO2 SERPL-SCNC: 23 MMOL/L (ref 20–32)
COLOR UR AUTO: YELLOW
CREAT SERPL-MCNC: 1.98 MG/DL (ref 0.52–1.04)
DIFFERENTIAL METHOD BLD: ABNORMAL
EOSINOPHIL # BLD AUTO: 0.1 10E9/L (ref 0–0.7)
EOSINOPHIL NFR BLD AUTO: 1.5 %
ERYTHROCYTE [DISTWIDTH] IN BLOOD BY AUTOMATED COUNT: 14.5 % (ref 10–15)
GFR SERPL CREATININE-BSD FRML MDRD: 25 ML/MIN/{1.73_M2}
GLUCOSE BLDC GLUCOMTR-MCNC: 156 MG/DL (ref 70–99)
GLUCOSE SERPL-MCNC: 192 MG/DL (ref 70–99)
GLUCOSE UR STRIP-MCNC: 150 MG/DL
HCT VFR BLD AUTO: 36.3 % (ref 35–47)
HGB BLD-MCNC: 11.4 G/DL (ref 11.7–15.7)
HGB UR QL STRIP: ABNORMAL
IMM GRANULOCYTES # BLD: 0 10E9/L (ref 0–0.4)
IMM GRANULOCYTES NFR BLD: 0.2 %
INTERPRETATION ECG - MUSE: NORMAL
KETONES UR STRIP-MCNC: NEGATIVE MG/DL
LACTATE BLD-SCNC: 0.6 MMOL/L (ref 0.7–2.1)
LEUKOCYTE ESTERASE UR QL STRIP: ABNORMAL
LYMPHOCYTES # BLD AUTO: 1.3 10E9/L (ref 0.8–5.3)
LYMPHOCYTES NFR BLD AUTO: 15.9 %
MCH RBC QN AUTO: 29.3 PG (ref 26.5–33)
MCHC RBC AUTO-ENTMCNC: 31.4 G/DL (ref 31.5–36.5)
MCV RBC AUTO: 93 FL (ref 78–100)
MONOCYTES # BLD AUTO: 0.5 10E9/L (ref 0–1.3)
MONOCYTES NFR BLD AUTO: 6.3 %
NEUTROPHILS # BLD AUTO: 6.4 10E9/L (ref 1.6–8.3)
NEUTROPHILS NFR BLD AUTO: 76 %
NITRATE UR QL: POSITIVE
NRBC # BLD AUTO: 0 10*3/UL
NRBC BLD AUTO-RTO: 0 /100
PCO2 BLDV: 45 MM HG (ref 40–50)
PH BLDV: 7.29 PH (ref 7.32–7.43)
PH UR STRIP: 6 PH (ref 5–7)
PLATELET # BLD AUTO: 200 10E9/L (ref 150–450)
PO2 BLDV: 58 MM HG (ref 25–47)
POTASSIUM SERPL-SCNC: 4.8 MMOL/L (ref 3.4–5.3)
PROT SERPL-MCNC: 8.5 G/DL (ref 6.8–8.8)
RBC # BLD AUTO: 3.89 10E12/L (ref 3.8–5.2)
RBC #/AREA URNS AUTO: 128 /HPF (ref 0–2)
SAO2 % BLDV FROM PO2: 86 %
SODIUM SERPL-SCNC: 141 MMOL/L (ref 133–144)
SOURCE: ABNORMAL
SP GR UR STRIP: 1.01 (ref 1–1.03)
SPECIMEN SOURCE: NORMAL
UROBILINOGEN UR STRIP-MCNC: NORMAL MG/DL (ref 0–2)
WBC # BLD AUTO: 8.4 10E9/L (ref 4–11)
WBC #/AREA URNS AUTO: >182 /HPF (ref 0–5)
WBC CLUMPS #/AREA URNS HPF: PRESENT /HPF

## 2019-07-15 PROCEDURE — 87077 CULTURE AEROBIC IDENTIFY: CPT | Performed by: EMERGENCY MEDICINE

## 2019-07-15 PROCEDURE — 87186 SC STD MICRODIL/AGAR DIL: CPT | Performed by: EMERGENCY MEDICINE

## 2019-07-15 PROCEDURE — 99285 EMERGENCY DEPT VISIT HI MDM: CPT | Mod: 25

## 2019-07-15 PROCEDURE — 82803 BLOOD GASES ANY COMBINATION: CPT

## 2019-07-15 PROCEDURE — 96365 THER/PROPH/DIAG IV INF INIT: CPT

## 2019-07-15 PROCEDURE — 36415 COLL VENOUS BLD VENIPUNCTURE: CPT

## 2019-07-15 PROCEDURE — 25000131 ZZH RX MED GY IP 250 OP 636 PS 637: Mod: GY | Performed by: STUDENT IN AN ORGANIZED HEALTH CARE EDUCATION/TRAINING PROGRAM

## 2019-07-15 PROCEDURE — 87800 DETECT AGNT MULT DNA DIREC: CPT | Performed by: EMERGENCY MEDICINE

## 2019-07-15 PROCEDURE — 70450 CT HEAD/BRAIN W/O DYE: CPT

## 2019-07-15 PROCEDURE — 71046 X-RAY EXAM CHEST 2 VIEWS: CPT

## 2019-07-15 PROCEDURE — 87086 URINE CULTURE/COLONY COUNT: CPT | Performed by: EMERGENCY MEDICINE

## 2019-07-15 PROCEDURE — 25800030 ZZH RX IP 258 OP 636: Performed by: STUDENT IN AN ORGANIZED HEALTH CARE EDUCATION/TRAINING PROGRAM

## 2019-07-15 PROCEDURE — 85025 COMPLETE CBC W/AUTO DIFF WBC: CPT | Performed by: EMERGENCY MEDICINE

## 2019-07-15 PROCEDURE — 81001 URINALYSIS AUTO W/SCOPE: CPT | Performed by: EMERGENCY MEDICINE

## 2019-07-15 PROCEDURE — 87181 SC STD AGAR DILUTION PER AGT: CPT | Performed by: EMERGENCY MEDICINE

## 2019-07-15 PROCEDURE — 25000132 ZZH RX MED GY IP 250 OP 250 PS 637: Mod: GY | Performed by: STUDENT IN AN ORGANIZED HEALTH CARE EDUCATION/TRAINING PROGRAM

## 2019-07-15 PROCEDURE — 80177 DRUG SCRN QUAN LEVETIRACETAM: CPT | Performed by: EMERGENCY MEDICINE

## 2019-07-15 PROCEDURE — 87040 BLOOD CULTURE FOR BACTERIA: CPT | Performed by: EMERGENCY MEDICINE

## 2019-07-15 PROCEDURE — 83605 ASSAY OF LACTIC ACID: CPT

## 2019-07-15 PROCEDURE — 99223 1ST HOSP IP/OBS HIGH 75: CPT | Mod: AI | Performed by: STUDENT IN AN ORGANIZED HEALTH CARE EDUCATION/TRAINING PROGRAM

## 2019-07-15 PROCEDURE — 12000000 ZZH R&B MED SURG/OB

## 2019-07-15 PROCEDURE — 25000128 H RX IP 250 OP 636: Performed by: EMERGENCY MEDICINE

## 2019-07-15 PROCEDURE — 87493 C DIFF AMPLIFIED PROBE: CPT | Performed by: EMERGENCY MEDICINE

## 2019-07-15 PROCEDURE — G0180 MD CERTIFICATION HHA PATIENT: HCPCS | Performed by: FAMILY MEDICINE

## 2019-07-15 PROCEDURE — 80197 ASSAY OF TACROLIMUS: CPT | Performed by: STUDENT IN AN ORGANIZED HEALTH CARE EDUCATION/TRAINING PROGRAM

## 2019-07-15 PROCEDURE — 80053 COMPREHEN METABOLIC PANEL: CPT | Performed by: EMERGENCY MEDICINE

## 2019-07-15 PROCEDURE — 87088 URINE BACTERIA CULTURE: CPT | Performed by: EMERGENCY MEDICINE

## 2019-07-15 PROCEDURE — 00000146 ZZHCL STATISTIC GLUCOSE BY METER IP

## 2019-07-15 PROCEDURE — 93005 ELECTROCARDIOGRAM TRACING: CPT

## 2019-07-15 RX ORDER — ASPIRIN 81 MG/1
81 TABLET ORAL DAILY
Status: DISCONTINUED | OUTPATIENT
Start: 2019-07-16 | End: 2019-07-21 | Stop reason: HOSPADM

## 2019-07-15 RX ORDER — ERTAPENEM 1 G/1
1 INJECTION, POWDER, LYOPHILIZED, FOR SOLUTION INTRAMUSCULAR; INTRAVENOUS ONCE
Status: COMPLETED | OUTPATIENT
Start: 2019-07-15 | End: 2019-07-15

## 2019-07-15 RX ORDER — ONDANSETRON 4 MG/1
4 TABLET, ORALLY DISINTEGRATING ORAL EVERY 6 HOURS PRN
Status: DISCONTINUED | OUTPATIENT
Start: 2019-07-15 | End: 2019-07-21 | Stop reason: HOSPADM

## 2019-07-15 RX ORDER — DEXTROSE MONOHYDRATE 25 G/50ML
25-50 INJECTION, SOLUTION INTRAVENOUS
Status: DISCONTINUED | OUTPATIENT
Start: 2019-07-15 | End: 2019-07-21 | Stop reason: HOSPADM

## 2019-07-15 RX ORDER — PREDNISONE 5 MG/1
5 TABLET ORAL DAILY
Status: DISCONTINUED | OUTPATIENT
Start: 2019-07-16 | End: 2019-07-21 | Stop reason: HOSPADM

## 2019-07-15 RX ORDER — NALOXONE HYDROCHLORIDE 0.4 MG/ML
.1-.4 INJECTION, SOLUTION INTRAMUSCULAR; INTRAVENOUS; SUBCUTANEOUS
Status: DISCONTINUED | OUTPATIENT
Start: 2019-07-15 | End: 2019-07-19

## 2019-07-15 RX ORDER — LEVOTHYROXINE SODIUM 50 UG/1
50 TABLET ORAL DAILY
Status: DISCONTINUED | OUTPATIENT
Start: 2019-07-15 | End: 2019-07-21 | Stop reason: HOSPADM

## 2019-07-15 RX ORDER — GABAPENTIN 300 MG/1
600 CAPSULE ORAL DAILY
COMMUNITY
End: 2019-07-29

## 2019-07-15 RX ORDER — ERTAPENEM 1 G/1
1 INJECTION, POWDER, LYOPHILIZED, FOR SOLUTION INTRAMUSCULAR; INTRAVENOUS EVERY 24 HOURS
Status: DISCONTINUED | OUTPATIENT
Start: 2019-07-16 | End: 2019-07-17

## 2019-07-15 RX ORDER — GABAPENTIN 300 MG/1
900 CAPSULE ORAL EVERY EVENING
COMMUNITY
End: 2019-07-29

## 2019-07-15 RX ORDER — ACETAMINOPHEN 325 MG/1
650 TABLET ORAL EVERY 4 HOURS PRN
Status: DISCONTINUED | OUTPATIENT
Start: 2019-07-15 | End: 2019-07-21 | Stop reason: HOSPADM

## 2019-07-15 RX ORDER — BACLOFEN 10 MG/1
10 TABLET ORAL 2 TIMES DAILY PRN
Status: DISCONTINUED | OUTPATIENT
Start: 2019-07-15 | End: 2019-07-21 | Stop reason: HOSPADM

## 2019-07-15 RX ORDER — LEVETIRACETAM 750 MG/1
750 TABLET ORAL 2 TIMES DAILY
Status: DISCONTINUED | OUTPATIENT
Start: 2019-07-15 | End: 2019-07-16

## 2019-07-15 RX ORDER — LIDOCAINE 40 MG/G
CREAM TOPICAL
Status: DISCONTINUED | OUTPATIENT
Start: 2019-07-15 | End: 2019-07-21 | Stop reason: HOSPADM

## 2019-07-15 RX ORDER — ONDANSETRON 2 MG/ML
4 INJECTION INTRAMUSCULAR; INTRAVENOUS EVERY 6 HOURS PRN
Status: DISCONTINUED | OUTPATIENT
Start: 2019-07-15 | End: 2019-07-21 | Stop reason: HOSPADM

## 2019-07-15 RX ORDER — TACROLIMUS 1 MG/1
2 CAPSULE ORAL 2 TIMES DAILY
Status: DISCONTINUED | OUTPATIENT
Start: 2019-07-15 | End: 2019-07-21 | Stop reason: HOSPADM

## 2019-07-15 RX ORDER — NICOTINE POLACRILEX 4 MG
15-30 LOZENGE BUCCAL
Status: DISCONTINUED | OUTPATIENT
Start: 2019-07-15 | End: 2019-07-21 | Stop reason: HOSPADM

## 2019-07-15 RX ORDER — FAMOTIDINE 20 MG/1
20 TABLET, FILM COATED ORAL 2 TIMES DAILY
Status: DISCONTINUED | OUTPATIENT
Start: 2019-07-15 | End: 2019-07-16 | Stop reason: DRUGHIGH

## 2019-07-15 RX ORDER — SODIUM CHLORIDE 9 MG/ML
INJECTION, SOLUTION INTRAVENOUS CONTINUOUS
Status: DISCONTINUED | OUTPATIENT
Start: 2019-07-15 | End: 2019-07-20

## 2019-07-15 RX ORDER — PRAVASTATIN SODIUM 10 MG
10 TABLET ORAL DAILY
Status: DISCONTINUED | OUTPATIENT
Start: 2019-07-16 | End: 2019-07-21 | Stop reason: HOSPADM

## 2019-07-15 RX ADMIN — FAMOTIDINE 20 MG: 20 TABLET, FILM COATED ORAL at 22:16

## 2019-07-15 RX ADMIN — SODIUM CHLORIDE, PRESERVATIVE FREE: 5 INJECTION INTRAVENOUS at 23:09

## 2019-07-15 RX ADMIN — SODIUM CHLORIDE 1000 ML: 9 INJECTION, SOLUTION INTRAVENOUS at 16:33

## 2019-07-15 RX ADMIN — TACROLIMUS 2 MG: 1 CAPSULE ORAL at 22:14

## 2019-07-15 RX ADMIN — Medication 1 MG: at 23:11

## 2019-07-15 RX ADMIN — Medication 150 MG: at 22:14

## 2019-07-15 RX ADMIN — INSULIN GLARGINE 15 UNITS: 100 INJECTION, SOLUTION SUBCUTANEOUS at 22:15

## 2019-07-15 RX ADMIN — ERTAPENEM SODIUM 1 G: 1 INJECTION, POWDER, LYOPHILIZED, FOR SOLUTION INTRAMUSCULAR; INTRAVENOUS at 16:54

## 2019-07-15 RX ADMIN — LEVOTHYROXINE SODIUM 50 MCG: 50 TABLET ORAL at 22:13

## 2019-07-15 RX ADMIN — ACETAMINOPHEN 650 MG: 325 TABLET, FILM COATED ORAL at 23:09

## 2019-07-15 RX ADMIN — LEVETIRACETAM 750 MG: 750 TABLET, FILM COATED ORAL at 22:14

## 2019-07-15 ASSESSMENT — ACTIVITIES OF DAILY LIVING (ADL): ADLS_ACUITY_SCORE: 30

## 2019-07-15 ASSESSMENT — ENCOUNTER SYMPTOMS
COUGH: 1
NAUSEA: 1
VOMITING: 1
ABDOMINAL PAIN: 0
HEADACHES: 0
DIARRHEA: 1

## 2019-07-15 NOTE — H&P
[facility]    History and Physical - Hospitalist Service       Date of Admission:  7/15/2019    Assessment & Plan     Gem Jade is a 73 year old female who presents with decreased level of alertness.     Acute encephalopathy, secondary to catheter related urinary tract infection  Chronic indwelling Johnson  Assessment: History of frequent urinary tract infections associated with chronic indwelling Johnson catheter.  Most recent urine cultures 2019 and 5/10/2019 demonstrated greater than 100,000 colony-forming units of Citrobacter.  Has a history of VRE, MRSA, and ESBL. Note that patient has followed with infectious disease clinic in the past related to recurrent urinary tract infections with colonization. Wonder if AMS is second to recurrent UTI vs seizure??  Plan:  - Admit to inpatient  - Continue indwelling Johnson catheter for retention  - Follow UC/BC  - IV Invanz for time being  - IVF  - ID consult, colonization vs true infection??  - Follow vitals/temp     Obstructive sleep apnea on CPAP:   -CPAP as per home settings      Type 2 diabetes with retinopathy, nephropathy, neuropathy including diabetic gastroparesis   Assessment: most recent A1c 6.2 on 2019. PTA on Glargine 30 U at bedtime.  Plan:  - Glargine reduced to 15 U at bedtime  - sliding scale insulin as needed  - Hypoglycemia protocol    End-stage renal disease status post historic right lower quadrant renal transplant:   Assessment: Renal transplant in .  Uncertain if this was living or  donor. Cr on admission 1.98, elevated from baseline 1.54-1.82.   Plan:  - Check Tacrolimus level  - IVF  - Continue prednisone 5 mg daily at this time  - Continue tacrolimus  - Avoid NSAIDs/nephrotoxins     Severe obesity:   -Increased risk of all cause mortality  -Outpatient follow-up.     History of provoked bilateral PE:   -not on anticoags     Paroxysmal atrial fibrillation:  Underwent 48-hour Holter monitoring in May 2016  demonstrating sinus rhythm with occasional supraventricular tachycardia.  No atrial fibrillation reported on that study.  Currently in sinus rhythm.  At time of event in 2016, recognition was for discussion with primary care provider regarding risk versus benefit of anticoagulation. Forgn9xaht score of 3 for age, female sex, diabetes.  -Defer further discussion of anticoagulation to primary care provider.       History of seizure disorder:   - Keppra level pending  - EEG in AM  - Neurology consult  - Continue PTA keppra for now     Hypertension:  -Continue prior to admission amlodipine 10 milligrams daily     Cystic finding in the pancreatic body.  Incidental on extensive imaging in the emergency department.  Has been noted previously as well.  Question of intraductal papillary mucinous neoplasm.  Patient follows with Dr. Chen of gastroenterology, largely for her gastroparesis.  - follow-up with gastroenterology.      Hypothyroidism:  -Continue prior to admission levothyroxine 50 mcg daily        Diet: NPO until more alert  DVT Prophylaxis: Pneumatic Compression Devices  Johnson Catheter: in place, indication:    Code Status: FULL CODE    Disposition Plan   Expected discharge: 2 - 3 days, recommended to prior living arrangement once renal function improved.  Entered: Guero Jacques MD 07/15/2019, 5:43 PM     The patient's care was discussed with the Patient and ED Provider.    Guero Jacques MD  [facility]    ______________________________________________________________________    Chief Complaint     AMS    History is obtained from the patient    History of Present Illness     Gem Jade is a 73 year old female with past medical history of renal transplant, chronic indwelling Johnson catheter, seizure disorder, type 2 diabetes mellitus, chronic kidney disease, VRE/ESBL, hypertension who presents for evaluation of altered mental status.    Patient's home care assistant was replacing her Johnson catheter  today when  it was reported that the patient became unresponsive.  EMS was activated, and when they arrived they gave the patient a sternal rub for which she only showed minimal response to.  Her blood sugars were found to be 184.  They did try Narcan 100 mg, with some improvement in her mentation.  She is at baseline not on narcotics.    Patient herself does not recall losing consciousness.  She does report that she feels like she is having a recurrent UTI.  She endorses 1 day history of diarrhea, which is not new for her.  She denies any fevers, any chest pain or shortness of breath.  She denies any abdominal pain.  She reports that when she does have seizure she does become unconscious.  She is unable to recall the last time she had a seizure.  She reports that she has been compliant with her Keppra, and has been eating and drinking at her baseline.  She denies any recent blood in her stool.  She does endorse some nausea, with minimal vomiting over the last few days.  She otherwise has no other complaints at this time.    Review of Systems    The 10 point Review of Systems is negative other than noted in the HPI or here.     Past Medical History    I have reviewed this patient's medical history and updated it with pertinent information if needed.   Past Medical History:   Diagnosis Date     Background diabetic retinopathy(362.01)     extensive diabetic retinopathy, s/p multiple laser treatments     Diabetic gastroparesis (H)     followed by [facility] (Dr. Chen)     Diarrhea      Dizziness and giddiness      Hyponatremia 12/16/11    Na 121     Kidney replaced by transplant      Mixed hyperlipidemia      Obesity      Osteopenia 11/07    per DEXA     Other and unspecified hyperlipidemia      Other pulmonary embolism and infarction 3/03    Bilateral pulmonary emboli post op after knee replacement     Polyneuropathy in diabetes(357.2)      Primary localized osteoarthrosis, lower leg      Pyelonephritis, unspecified       Type 2 diabetes mellitus with complications (H)      Urinary tract infection, site not specified     Chronic UTIs     UTI (urinary tract infection) due to urinary indwelling catheter (H)        Past Surgical History   I have reviewed this patient's surgical history and updated it with pertinent information if needed.  Past Surgical History:   Procedure Laterality Date     C NONSPECIFIC PROCEDURE  10/99    kidney transplant     C NONSPECIFIC PROCEDURE      MRI head, lumbar spine, pelvis     C TOTAL KNEE ARTHROPLASTY  3/03    Knee Replacement, Total right, post op PE     CHOLECYSTECTOMY       HC LEFT HEART CATHETERIZATION      Cardiac Cath, Left Heart, Negative  (@ [facility])     HC LEFT HEART CATHETERIZATION      normal coronary angiogram at [facility], had mild troponin rise (0.71)     HC REMV CATARACT EXTRACAP,INSERT LENS      bilateral cataract repaired, left eye        Social History   I have reviewed this patient's social history and updated it with pertinent information if needed.  Social History     Tobacco Use     Smoking status: Former Smoker     Years: 17.00     Last attempt to quit: 1987     Years since quittin.1     Smokeless tobacco: Never Used   Substance Use Topics     Alcohol use: No     Drug use: No       Family History   I have reviewed this patient's family history and updated it with pertinent information if needed.   Family History   Problem Relation Age of Onset     Diabetes Mother      Parkinsonism Mother      Diabetes Brother      Hypertension Father         [id number] pneumonia     Heart Disease Father      Prior to Admission Medications   Prior to Admission Medications   Prescriptions Last Dose Informant Patient Reported? Taking?   ACETAMINOPHEN PO  Other Yes No   Sig: Take 325-650 mg by mouth every 4 hours as needed.   COMFORT EZ PEN NEEDLES 31G X 6 MM miscellaneous  Other No No   Sig: USE 4 DAILY OR AS DIRECTED   Cranberry 400 MG TABS  Other Yes No   Sig: Take 400 mg by  mouth 2 times daily   Lactobacillus Rhamnosus, GG, (CULTURELL) capsule  Other No No   Sig: Take 1 capsule by mouth 2 times daily   VITAMIN D, CHOLECALCIFEROL, PO  Other Yes No   Sig: Take 4,000 Units by mouth 2 times daily   amLODIPine (NORVASC) 5 MG tablet  Other No No   Sig: Take 2 tablets (10 mg) by mouth daily   aspirin 81 MG tablet  Other Yes No   Sig: Take 81 mg by mouth daily   baclofen (LIORESAL) 10 MG tablet  Other No No   Sig: Take 1 tablet (10 mg) by mouth 2 times daily as needed for other (Bladder Spasms)   blood glucose (ACCU-CHEK COMPACT PLUS) test strip  Other No No   Sig: USE TO TEST BLOOD SUGAR THREE TIMES A DAY   blood glucose monitoring (NO BRAND SPECIFIED) meter device kit   No No   Sig: Use to test blood sugar three times daily or as directed.   cephALEXin (KEFLEX) 500 MG capsule   No No   Sig: Take 1 capsule (500 mg) by mouth 3 times daily   ciprofloxacin (CIPRO) 250 MG tablet   No No   Sig: Take 1 tablet (250 mg) by mouth 2 times daily   cyanocobalamin (CYANOCOBALAMIN) 1000 MCG/ML injection   No No   Sig: Inject 1 mL (1,000 mcg) into the muscle every 2 months   diphenoxylate-atropine (LOMOTIL) 2.5-0.025 MG tablet   No No   Sig: Take 1-2 tablets by mouth 4 times daily as needed for diarrhea   famotidine (PEPCID) 20 MG tablet  Other Yes No   Sig: Take 1 tablet (20 mg) by mouth 2 times daily   gabapentin (NEURONTIN) 300 MG capsule  Other No No   Sig: Take 2 capsules (600 mg) by mouth 2 times daily May take an additional 300mg at bedtime daily with increase pain   insulin aspart (NOVOLOG FLEXPEN) 100 UNIT/ML pen  Other Yes No   Sig: Take 10 units with meals twice daily with sliding scale. Max 40 units per day  Sliding scale:   140-199 2u  200-249 4u  250-299 6u  300-349 8u  350-399 10u  Over 400 12u   insulin glargine (LANTUS SOLOSTAR PEN) 100 UNIT/ML pen  Other Yes No   Sig: Inject 30 Units Subcutaneous At Bedtime   labetalol (NORMODYNE) 300 MG tablet   Yes No   Sig: Take 0.5 tablets (150 mg) by  "mouth 2 times daily   levETIRAcetam (KEPPRA) 750 MG tablet  Other No No   Sig: Take 1 tablet (750 mg) by mouth 2 times daily   levothyroxine (SYNTHROID/LEVOTHROID) 50 MCG tablet  Other No No   Sig: TAKE ONE TABLET BY MOUTH EVERY DAY   lidocaine (XYLOCAINE) 2 % topical gel  Other No No   Sig: Apply topically as needed for moderate pain 10 ML every 3 weeks   loratadine (CLARITIN) 10 MG tablet  Other No No   Sig: Take 1 tablet (10 mg) by mouth daily   menthol-zinc oxide (CALMOSEPTINE) 0.44-20.625 % OINT ointment  Other Yes No   Sig: Apply topically 4 times daily as needed for skin protection   ondansetron (ZOFRAN ODT) 4 MG ODT tab  Other No No   Sig: Take 1 tablet (4 mg) by mouth daily as needed for nausea 30 minutes before getting up in your wheelchair   order for DME  Other No No   Sig: Equipment being ordered: [employer] gel-T cushion 20 inches wide & 18 inches deep    height 1.676 m (5' 6\"), weight 101.6 kg (223 lb 15.8 oz)   order for DME  Other No No   Sig: Equipment being ordered: Tegaderm 4x4   MICHELLE 6 months  Sacral wound   pravastatin (PRAVACHOL) 10 MG tablet  Other No No   Sig: Take 1 tablet (10 mg) by mouth daily   prazosin (MINIPRESS) 1 MG capsule   No No   Sig: Take 2 capsules (2 mg) by mouth At Bedtime   predniSONE (DELTASONE) 5 MG tablet  Other No No   Sig: Take 1 tablet (5 mg) by mouth daily   simethicone (MYLICON) 125 MG CHEW  Other Yes No   Sig: Take 1 tablet (125 mg) by mouth as needed for intestinal gas   tacrolimus (GENERIC EQUIVALENT) 1 MG capsule  Other No No   Sig: Take 2 capsules (2 mg) by mouth 2 times daily      Facility-Administered Medications: None     Allergies   Allergies   Allergen Reactions     Atorvastatin Calcium      myalgias, muscle aches     Codeine Nausea and Vomiting     Darvocet [Propoxyphene N-Apap] Nausea and Vomiting     Hydromorphone      Levofloxacin Other (See Comments) and Diarrhea     hallucinations     Morphine      Nausea and vomiting     Niaspan [Niacin] GI " Disturbance     Flushing even at low dose, GI upset     Percocet [Oxycodone-Acetaminophen]      Vomiting after taking med couple of times     Vicodin [Hydrocodone-Acetaminophen] Nausea and Vomiting       Physical Exam   Vital Signs: Temp: 98.3  F (36.8  C) Temp src: Oral BP: 180/78 Pulse: 70     SpO2: 96 % O2 Device: None (Room air)    Weight: 0 lbs 0 oz    General Appearance: no apparent distress  Eyes: No icterus or injection  HEENT: Dry oral mucosa  Respiratory: CTABL  Cardiovascular: Regular rate and rhythm, no appreciable murmur.  Heart sounds distant.  GI: Obese, no tenderness, soft.  I am unable to palpate right lower quadrant transplant kidney in the setting of obesity.  Lymph/Hematologic: Chronic bilateral lower extremity lymphedema  Genitourinary: Johnson catheter in place  Skin: No rashes  Musculoskeletal: Muscular wasting present  Neurologic: Alert and oriented. CN II-XII intact.   Psychiatric: normal mood and affefct          Data   Data reviewed today: I reviewed all medications, new labs and imaging results over the last 24 hours. I personally reviewed the chest x-ray image(s) showing no acute airspace disease and the head CT image(s) showing No acute pathology, no bleed, mass, or areas of acute.    Most Recent 3 CBC's:  Recent Labs   Lab Test 07/15/19  1546 07/01/19  1208 06/18/19  1400   WBC 8.4 7.6 5.5   HGB 11.4* 9.5* 9.3*   MCV 93 93 94    173 148*     Most Recent 3 BMP's:  Recent Labs   Lab Test 07/15/19  1546 07/01/19  1208 06/13/19  1440    138 141   POTASSIUM 4.8 4.5 4.9   CHLORIDE 109 111* 110*   CO2 23 25 26   BUN 53* 52* 42*   CR 1.98* 1.68* 1.61*   ANIONGAP 9 2* 5   ROGER 9.3 8.4* 7.9*   * 127* 132*     Most Recent Urinalysis:  Recent Labs   Lab Test 07/15/19  1535  01/11/18  1614   COLOR Yellow   < > Yellow   APPEARANCE Slightly Cloudy   < > Turbid   URINEGLC 150*   < > 100*   URINEBILI Negative   < > Negative   URINEKETONE Negative   < > Negative   SG 1.014   < > 1.025    UBLD Moderate*   < > Large*   URINEPH 6.0   < > 6.0   PROTEIN 300*   < > >=300*   UROBILINOGEN  --   --  0.2   NITRITE Positive*   < > Positive*   LEUKEST Large*   < > Large*   RBCU 128*   < > 5-10*   WBCU >182*   < > >100*    < > = values in this interval not displayed.     Recent Results (from the past 24 hour(s))   Head CT w/o contrast    Narrative    CT SCAN OF THE HEAD WITHOUT CONTRAST   7/15/2019 4:09 PM     HISTORY: Altered mental status (LOC), unexplained    TECHNIQUE:  Axial images of the head and coronal reformations without  IV contrast material. Radiation dose for this scan was reduced using  automated exposure control, adjustment of the mA and/or kV according  to patient size, or iterative reconstruction technique.    COMPARISON: Scan dated 6/2/2019.    FINDINGS:     Intracranial contents: There is diffuse parenchymal volume loss.   White matter changes are present in the cerebral hemispheres that are  consistent with small vessel ischemic disease in this age patient.  There is no evidence of intracranial hemorrhage, mass, acute infarct  or anomaly.    Visualized orbits/sinuses/mastoids:  The visualized portions of the  sinuses and mastoids appear normal.    Osseous structures/soft tissues:  There is no evidence of trauma.      Impression    IMPRESSION: No acute pathology, no bleed, mass, or areas of acute  infarction are identified. No significant change.      NAYANA GONSALES MD   Chest XR,  PA & LAT    Narrative    XR CHEST 2 VW 7/15/2019 4:17 PM    HISTORY: Altered mental status.    COMPARISON: 3/1/2019, 9/15/2018    FINDINGS: Chronic elevation of the right hemidiaphragm. No airspace  consolidation, effusion or pneumothorax. Stable heart size.      Impression    IMPRESSION: No acute abnormality.    CÉSAR NORTON MD

## 2019-07-15 NOTE — ED PROVIDER NOTES
History     Chief Complaint:  Altered mental status     HPI   History limited by patient's altered mental status. History provided by EMS.   Gem Jade is a 73 year old female with a history of C. difficile colitis, CKD s/p kidney transplant, pyelonephritis, diabetes, post-operative PE, and hyponatremia who presents with altered mental status. The patient has had frequent hospital admissions for decreased level of alertness, possibly due to encephalopathy with frequent UTIs. She has a chronic indwelling Johnson catheter with a history of VRE, MRSA, and ESBL. Most recent admission with infection was June 3rd where she was treated with ertapenem. She is on Keppra as well for a seizure disorder. She comes in today with altered mental status. Her home care assistant was replacing the Johnson catheter today when she suddenly went unresponsive. When EMS arrived, they gave a firm sternal rub with minimal response. Blood sugar was 184. They tried Narcan 1.2 mg because her pupils were pinpoint, after which she was more alert. They do not believe she is on narcotics however. The home care personnel was concerned about UTI due to her history.     The patient is awake here in the ED and endorses recent cough, nausea, vomiting, and one day of diarrhea. The patient denies fever, head pain, chest pain, or abdominal pain. The patient says she has seizures infrequently and describes these as small and says she goes unconscious during these. She can't remember when her last seizure was. She says she takes her Keppra as directed and has been eating as usual.     Allergies:  Atorvastatin Calcium  Codeine  Darvocet [Propoxyphene N-Apap]  Hydromorphone  Levofloxacin  Morphine  Niaspan [Niacin]  Percocet [Oxycodone-Acetaminophen]  Vicodin [Hydrocodone-Acetaminophen]       Medications:    Amlodipine  Baby  aspirin  Baclofen  Keflex  Cipro  Cyanocobalamin  Lomotil  Pepcid  Gabapentin  Novolog  Lantus  Labetalol  Keppra  Levothyroxine  Pravastatin  Prazosin  Prednisone  Simethicone  Tacrolimus  Cholecalciferol    Past Medical History:   Pyelonephritis  CKD s/p kidney transplant   Recurrent UTI with history of VRE, MRSA, and ESBL  Indwelling catheter  Type 2 diabetes  Seizure disorder  ALLAN on CPAP  Paroxysmal A-fib  Diabetic polyneuropathy  Chronic shoulder pain  C. Diff colitis  Fecal VRE  Hypertension  Compression fracture   PE   Hyperlipidemia  Diabetic gastroparesis  Osteoarthrosis  Hypothyroidism    Past Surgical History:    Kidney transplant  Right TKR  Cholecystectomy  Left heart catheterization x 2  Bilateral cataract IOL    Family History:    Diabetes   Parkinsonism  Hypertension  Heart disease    Social History:  Smoking status: Former  Alcohol use: No  Drug use: No  Patient presents alone.  PCP: Marivel Barcenas    Marital Status:       Review of Systems   Unable to perform ROS: Mental status change   Respiratory: Positive for cough.    Cardiovascular: Negative for chest pain.   Gastrointestinal: Positive for diarrhea, nausea and vomiting. Negative for abdominal pain.   Neurological: Negative for headaches.       Physical Exam     Patient Vitals for the past 24 hrs:   BP Temp Temp src Pulse SpO2   07/15/19 1514 180/78 98.3  F (36.8  C) Oral 70 96 %      Physical Exam    Physical Exam   Constitutional:  Female, lying supine in bed. Appears post-ictal.  HENT:      Mouth/Throat:   Oropharynx is clear and moist. No tongue laceration.   Eyes:    Conjunctivae normal and EOM are normal. Pupils are equal, round, and reactive to light.   Neck:    Normal range of motion.   Cardiovascular: Normal rate, regular rhythm and normal heart sounds.  Exam reveals no gallop and no friction rub.  No murmur heard.  Pulmonary/Chest:  Effort normal and breath sounds normal. Patient has no wheezes. Patient has no  rales.   Abdominal:   Soft. Bowel sounds are normal. Patient exhibits no mass. There is no tenderness. There is no rebound and no guarding. High BMI.   Musculoskeletal:  Normal range of motion. Bilateral pedal edema.   Neurological:   Patient is oriented to person only. Patient is generally very weak. No cranial nerve deficit or sensory deficit. GCS 15  Skin:   Skin is warm and dry. No rash noted. No erythema. mild skin break down on the lower buttocks  Psychiatric:   Patient has a normal mood      Emergency Department Course     ECG (15:08:56):  Rate 68 bpm. MD interval 198. QRS duration 80. QT/QTc 414/440. P-R-T axes 119 14 74. Normal sinus rhythm. Nonspecific ST abnormality. Interpreted at 1515 by Bailey Lopez MD.     X-ray Chest, 2 views:  No acute abnormality.  Result per radiology.    CT-scan Head w/o contrast:  No acute pathology, no bleed, mass, or areas of acute  infarction are identified. No significant change.  Result per radiology.       Laboratory:  ISTAT VBG Lactate: pH 7.29 (L), PCO2 45, PO2 58 (H), Bicarb 22, O2 Sat 86, Lactic Acid 0.6 (L)   CBC: HGB 11.4 (L), o/w WNL (WBC 8.4, )  CMP: Glucose 192 (H), BUN 53 (H), Creatinine 1.98 (H), GFR 25 (L), Albumin 3.1, o/w WNL  Keppra: Pending  Blood Culture x2: Pending    UA: Glucose 150, Blood moderate, Albumin 300, Nitrite positive, Leukocyte esterase large, WBC >182,  (H), WBC Clumps present, o/w Negative  Urine Culture: Pending    C. Difficile toxin B PCR: Pending    Interventions:  1633 - NS 1 L IV Bolus   1654 - Ertapenem 1 g IV    Emergency Department Course:  The patient arrived in the emergency department via EMS.    Past medical records, nursing notes, and vitals reviewed.  1520: I performed an exam of the patient and obtained history, as documented above.     IV inserted and blood drawn. This was sent to laboratory for testing, findings above.  The patient provided a urine sample here in the emergency department. This was  sent for laboratory testing, findings above. A stool sample was obtained and sent to laboratory for testing. Findings pending. The patient was sent for a chest x-ray while in the emergency department, findings above.     1654: The patient was started on 1 g of ertapenem of IV.     1726: I rechecked the patient. Explained findings to patient. Seizure pads were placed.     1750: Discussed the patient with Dr. Jacques, who will admit the patient to a medical bed for further monitoring, evaluation, and treatment.      Impression & Plan      Medical Decision Making:  Gem Jade is a 73 year old female presenting with a witnessed unresponsive episode. She seemed to be post-ictal on initial arrival. I do see that she takes Keppra for seizures. She is unable to tell me what kind of seizures she has, but states does not have them a lot. An ISTAT lactic was obtained. This was within normal limits. Blood work shows no leukocytosis. She has chronic anemia, unchanged for her. She has worsening renal failure with a creatinine of 1.98. She was given IV fluids here. Urine is positive for infection, quite significantly. Urine culture and blood cultures were obtained. Chest x-ray shows no acute abnormality and this was performed as the patient endorsed a little cough when she first arrived. I reviewed the patient's chart and when she was here a month ago for UTI and delirium, they recommended ertapenem to start. She was given this in the ED without complications. I will admit her to hospital. I suspect that she had a seizure as her AMS was abrupt and mentation is clearing, however she is hallucinating, thinking there are people in the room talking and arguing with her. Again, I see that this is a consistent presentation for her as well. Seizure pads were placed at the bedside. We will admit to Dr. Jacques.     Diagnosis:    ICD-10-CM    1. Urinary tract infection associated with catheterization of urinary tract, unspecified  indwelling urinary catheter type, initial encounter (H) T83.511A Tacrolimus level    N39.0    2. Seizure disorder (H) G40.909    3. Altered mental status, unspecified altered mental status type R41.82      Disposition:  Admitted.    Jorgito Bradley  7/15/2019    EMERGENCY DEPARTMENT    Scribe Disclosure:  IJorgito Chi, am serving as a scribe at 3:01 PM on 7/15/2019 to document services personally performed by Bailey Lopez MD based on my observations and the provider's statements to me.        Bailey Lopez MD  07/15/19 [id number]

## 2019-07-15 NOTE — ED NOTES
Patient brought back from CT and X-Ray. Patient incontinent of stool. Patient cleaned up and repositioned for comfort.

## 2019-07-15 NOTE — PROGRESS NOTES
RECEIVING UNIT ED HANDOFF REVIEW    ED Nurse Handoff Report was reviewed by: Rosemarie Sanders on July 15, 2019 at 6:53 PM

## 2019-07-15 NOTE — ED NOTES
Patient incontinent of stool. Patient cleaned up and repositioned for comfort. Patient denies any needs at this time.

## 2019-07-15 NOTE — PHARMACY-ADMISSION MEDICATION HISTORY
Admission medication history interview status for the 7/15/2019  admission is complete. See EPIC admission navigator for prior to admission medications     Medication history source reliability:Good-     Actions taken by pharmacist (provider contacted, etc): Contacted  who helps with meds and reviewed SureScripts     Additional medication history information not noted on PTA med list :  reports medications have not changed last couple months    Medication reconciliation/reorder completed by provider prior to medication history? Yes    Time spent in this activity: 20 min    Prior to Admission medications    Medication Sig Last Dose Taking? Auth Provider   ACETAMINOPHEN PO Take 325-650 mg by mouth every 4 hours as needed. prn Yes Unknown, Entered By History   amLODIPine (NORVASC) 5 MG tablet Take 2 tablets (10 mg) by mouth daily 7/14/2019 at Unknown time Yes Marivel Barcenas APRN CNP   aspirin 81 MG tablet Take 81 mg by mouth daily 7/14/2019 at Unknown time Yes Unknown, Entered By History   baclofen (LIORESAL) 10 MG tablet Take 1 tablet (10 mg) by mouth 2 times daily as needed for other (Bladder Spasms) Past Week at Unknown time Yes Marivel Barcenas APRN CNP   Cranberry 400 MG TABS Take 400 mg by mouth 2 times daily 7/14/2019 at Unknown time Yes Unknown, Entered By History   cyanocobalamin (CYANOCOBALAMIN) 1000 MCG/ML injection Inject 1 mL (1,000 mcg) into the muscle every 2 months  Yes Marivel Barcenas APRN CNP   diphenoxylate-atropine (LOMOTIL) 2.5-0.025 MG tablet Take 1-2 tablets by mouth 4 times daily as needed for diarrhea Past Week at Unknown time Yes Pat Tinajero APRN CNP   famotidine (PEPCID) 20 MG tablet Take 1 tablet (20 mg) by mouth 2 times daily 7/14/2019 at Unknown time Yes Marivel Barcenas APRN CNP   gabapentin (NEURONTIN) 300 MG capsule Take 600 mg by mouth daily 7/14/2019 at Unknown time Yes Unknown, Entered By History   gabapentin  (NEURONTIN) 300 MG capsule Take 900 mg by mouth every evening 7/14/2019 at Unknown time Yes Unknown, Entered By History   insulin aspart (NOVOLOG FLEXPEN) 100 UNIT/ML pen Take 10 units with meals + sliding scale if eating. Sliding scale only at bedtime when not eating. Max 40 units per day  Sliding scale:   140-199 2u  200-249 4u  250-299 6u  300-349 8u  350-399 10u  Over 400 12u Past Week at Unknown time Yes    insulin glargine (LANTUS SOLOSTAR PEN) 100 UNIT/ML pen Inject 30 Units Subcutaneous At Bedtime 7/13/2019 Yes    labetalol (NORMODYNE) 300 MG tablet Take 0.5 tablets (150 mg) by mouth 2 times daily 7/14/2019 at Unknown time Yes Marivel Barceans APRN CNP   Lactobacillus Rhamnosus, GG, (CULTURELL) capsule Take 1 capsule by mouth 2 times daily 7/14/2019 at Unknown time Yes She Siddiqui MD   levETIRAcetam (KEPPRA) 750 MG tablet Take 1 tablet (750 mg) by mouth 2 times daily 7/14/2019 at Unknown time Yes Marivel Barcenas APRN CNP   levothyroxine (SYNTHROID/LEVOTHROID) 50 MCG tablet TAKE ONE TABLET BY MOUTH EVERY DAY 7/14/2019 at Unknown time Yes Marivel Barcenas APRN CNP   loratadine (CLARITIN) 10 MG tablet Take 1 tablet (10 mg) by mouth daily 7/14/2019 at Unknown time Yes Marivel Barcenas APRN CNP   ondansetron (ZOFRAN ODT) 4 MG ODT tab Take 1 tablet (4 mg) by mouth daily as needed for nausea 30 minutes before getting up in your wheelchair prn Yes Marivel Barcenas APRN CNP   pravastatin (PRAVACHOL) 10 MG tablet Take 1 tablet (10 mg) by mouth daily 7/14/2019 at Unknown time Yes Marivel Barcenas APRN CNP   prazosin (MINIPRESS) 1 MG capsule Take 2 capsules (2 mg) by mouth At Bedtime  Patient taking differently: Take 1-2 mg by mouth nightly as needed  Past Month at Unknown time Yes Marivel Barcenas APRN CNP   predniSONE (DELTASONE) 5 MG tablet Take 1 tablet (5 mg) by mouth daily 7/14/2019 at Unknown time Yes Marivel Barcenas,  "APRN CNP   simethicone (MYLICON) 125 MG CHEW Take 1 tablet (125 mg) by mouth as needed for intestinal gas prn Yes    tacrolimus (GENERIC EQUIVALENT) 1 MG capsule Take 2 capsules (2 mg) by mouth 2 times daily 7/14/2019 at Unknown time Yes Marcus Ley MD   VITAMIN D, CHOLECALCIFEROL, PO Take 4,000 Units by mouth 2 times daily 7/14/2019 at Unknown time Yes Unknown, Entered By History   blood glucose (ACCU-CHEK COMPACT PLUS) test strip USE TO TEST BLOOD SUGAR THREE TIMES A DAY   Marivel Barcenas APRN CNP   blood glucose monitoring (NO BRAND SPECIFIED) meter device kit Use to test blood sugar three times daily or as directed.   Marivel Barcenas APRN CNP   COMFORT EZ PEN NEEDLES 31G X 6 MM miscellaneous USE 4 DAILY OR AS DIRECTED   Marivel Barcenas APRN CNP   lidocaine (XYLOCAINE) 2 % topical gel Apply topically as needed for moderate pain 10 ML every 3 weeks   Marivel Barcenas APRN CNP   menthol-zinc oxide (CALMOSEPTINE) 0.44-20.625 % OINT ointment Apply topically 4 times daily as needed for skin protection   Marivel Barcenas APRN CNP   order for DME Equipment being ordered: Tegaderm 4x4   MICHELLE 6 months  Sacral wound   Marivel Barcenas APRN CNP   order for DME Equipment being ordered: saambaa gel-T cushion 20 inches wide & 18 inches deep    height 1.676 m (5' 6\"), weight 101.6 kg (223 lb 15.8 oz)   Marivel Barcenas APRN CNP        "

## 2019-07-15 NOTE — ED NOTES
Bed: ED25  Expected date:   Expected time:   Means of arrival:   Comments:  Jaimee Linda7 AMS marcialcujeffrey 73 m

## 2019-07-15 NOTE — ED NOTES
"[facility]  ED Nurse Handoff Report    ED Chief complaint: Altered Mental Status      ED Diagnosis:   Final diagnoses:   Urinary tract infection associated with catheterization of urinary tract, unspecified indwelling urinary catheter type, initial encounter (H)   Seizure disorder (H)   Altered mental status, unspecified altered mental status type       Code Status: Not addressed by ED MD.      Allergies:   Allergies   Allergen Reactions     Atorvastatin Calcium      myalgias, muscle aches     Codeine Nausea and Vomiting     Darvocet [Propoxyphene N-Apap] Nausea and Vomiting     Hydromorphone      Levofloxacin Other (See Comments) and Diarrhea     hallucinations     Morphine      Nausea and vomiting     Niaspan [Niacin] GI Disturbance     Flushing even at low dose, GI upset     Percocet [Oxycodone-Acetaminophen]      Vomiting after taking med couple of times     Vicodin [Hydrocodone-Acetaminophen] Nausea and Vomiting       Activity level - Baseline/Home:  Total Care  Activity Level - Current:   Total Care    Patient's Preferred language: English     Needed?: No    Isolation: Yes  Infection: C-Diff Pending  ESBL  MRSA  VRE  Bariatric?: No    Vital Signs:   Vitals:    07/15/19 1514   BP: 180/78   Pulse: 70   Temp: 98.3  F (36.8  C)   TempSrc: Oral   SpO2: 96%       Cardiac Rhythm: ,        Pain level:      Is this patient confused?: No   Does this patient have a guardian?  Yes         If yes, is there guardianship documents in the Epic \"Code/ACP\" activity?  N/A         Guardian Notified?  N/A  [place] - Suicide Severity Rating Scale Completed?  Yes  If yes, what color did the patient score?  White    Patient Report: Initial Complaint Altered mental status  Focused Assessment: : Patient brought in by EMS from home. Patient was found lethargic and altered mental status after catheter change today. Patient was not very responsive upon arrival to ED. Patient gets home care assist and is bed " bound per spouse. Patient has a history of delirium and hallucinations with previous UTIs. Patient has been hallucinating while in ED. Patient has been incontinent of stool and has catheter placed. Patient is conversant with staff. C-Diff is pending. Patient has received IV fluid bolus and invanz for UTI. MD suspects patient may have had a seizure at home and was pot ictal on arrival to ED.    Tests Performed:   Labs, UA, BC, Stool  Abnormal Results:   Results for orders placed or performed during the hospital encounter of 07/15/19   Head CT w/o contrast    Narrative    CT SCAN OF THE HEAD WITHOUT CONTRAST   7/15/2019 4:09 PM     HISTORY: Altered mental status (LOC), unexplained    TECHNIQUE:  Axial images of the head and coronal reformations without  IV contrast material. Radiation dose for this scan was reduced using  automated exposure control, adjustment of the mA and/or kV according  to patient size, or iterative reconstruction technique.    COMPARISON: Scan dated 6/2/2019.    FINDINGS:     Intracranial contents: There is diffuse parenchymal volume loss.   White matter changes are present in the cerebral hemispheres that are  consistent with small vessel ischemic disease in this age patient.  There is no evidence of intracranial hemorrhage, mass, acute infarct  or anomaly.    Visualized orbits/sinuses/mastoids:  The visualized portions of the  sinuses and mastoids appear normal.    Osseous structures/soft tissues:  There is no evidence of trauma.      Impression    IMPRESSION: No acute pathology, no bleed, mass, or areas of acute  infarction are identified. No significant change.      NAYANA GONSALES MD   Chest XR,  PA & LAT    Narrative    XR CHEST 2 VW 7/15/2019 4:17 PM    HISTORY: Altered mental status.    COMPARISON: 3/1/2019, 9/15/2018    FINDINGS: Chronic elevation of the right hemidiaphragm. No airspace  consolidation, effusion or pneumothorax. Stable heart size.      Impression    IMPRESSION: No acute  abnormality.    CÉSAR NORTON MD   CBC with platelets differential   Result Value Ref Range    WBC 8.4 4.0 - 11.0 10e9/L    RBC Count 3.89 3.8 - 5.2 10e12/L    Hemoglobin 11.4 (L) 11.7 - 15.7 g/dL    Hematocrit 36.3 35.0 - 47.0 %    MCV 93 78 - 100 fl    MCH 29.3 26.5 - 33.0 pg    MCHC 31.4 (L) 31.5 - 36.5 g/dL    RDW 14.5 10.0 - 15.0 %    Platelet Count 200 150 - 450 10e9/L    Diff Method Automated Method     % Neutrophils 76.0 %    % Lymphocytes 15.9 %    % Monocytes 6.3 %    % Eosinophils 1.5 %    % Basophils 0.1 %    % Immature Granulocytes 0.2 %    Nucleated RBCs 0 0 /100    Absolute Neutrophil 6.4 1.6 - 8.3 10e9/L    Absolute Lymphocytes 1.3 0.8 - 5.3 10e9/L    Absolute Monocytes 0.5 0.0 - 1.3 10e9/L    Absolute Eosinophils 0.1 0.0 - 0.7 10e9/L    Absolute Basophils 0.0 0.0 - 0.2 10e9/L    Abs Immature Granulocytes 0.0 0 - 0.4 10e9/L    Absolute Nucleated RBC 0.0    Comprehensive metabolic panel   Result Value Ref Range    Sodium 141 133 - 144 mmol/L    Potassium 4.8 3.4 - 5.3 mmol/L    Chloride 109 94 - 109 mmol/L    Carbon Dioxide 23 20 - 32 mmol/L    Anion Gap 9 3 - 14 mmol/L    Glucose 192 (H) 70 - 99 mg/dL    Urea Nitrogen 53 (H) 7 - 30 mg/dL    Creatinine 1.98 (H) 0.52 - 1.04 mg/dL    GFR Estimate 25 (L) >60 mL/min/[1.73_m2]    GFR Estimate If Black 28 (L) >60 mL/min/[1.73_m2]    Calcium 9.3 8.5 - 10.1 mg/dL    Bilirubin Total 0.3 0.2 - 1.3 mg/dL    Albumin 3.1 (L) 3.4 - 5.0 g/dL    Protein Total 8.5 6.8 - 8.8 g/dL    Alkaline Phosphatase 113 40 - 150 U/L    ALT 18 0 - 50 U/L    AST 16 0 - 45 U/L   UA with Microscopic   Result Value Ref Range    Color Urine Yellow     Appearance Urine Slightly Cloudy     Glucose Urine 150 (A) NEG^Negative mg/dL    Bilirubin Urine Negative NEG^Negative    Ketones Urine Negative NEG^Negative mg/dL    Specific Gravity Urine 1.014 1.003 - 1.035    Blood Urine Moderate (A) NEG^Negative    pH Urine 6.0 5.0 - 7.0 pH    Protein Albumin Urine 300 (A) NEG^Negative mg/dL     Urobilinogen mg/dL Normal 0.0 - 2.0 mg/dL    Nitrite Urine Positive (A) NEG^Negative    Leukocyte Esterase Urine Large (A) NEG^Negative    Source Catheterized Urine     WBC Urine >182 (H) 0 - 5 /HPF    RBC Urine 128 (H) 0 - 2 /HPF    WBC Clumps Present (A) NEG^Negative /HPF   EKG 12 lead   Result Value Ref Range    Interpretation ECG Click View Image link to view waveform and result    ISTAT gases lactate galindo POCT   Result Value Ref Range    Ph Venous 7.29 (L) 7.32 - 7.43 pH    PCO2 Venous 45 40 - 50 mm Hg    PO2 Venous 58 (H) 25 - 47 mm Hg    Bicarbonate Venous 22 21 - 28 mmol/L    O2 Sat Venous 86 %    Lactic Acid 0.6 (L) 0.7 - 2.1 mmol/L     *Note: Due to a large number of results and/or encounters for the requested time period, some results have not been displayed. A complete set of results can be found in Results Review.       Treatments provided: IVF bolus and antibiotic    Family Comments: Notified    OBS brochure/video discussed/provided to patient/family: N/A              Name of person given brochure if not patient: N/A              Relationship to patient: N/A    ED Medications:   Medications   0.9% sodium chloride BOLUS (0 mLs Intravenous Stopped 7/15/19 1754)   ertapenem (INVanz) 1 g vial to attach to  mL bag (0 g Intravenous Stopped 7/15/19 1754)       Drips infusing?:  No    For the majority of the shift this patient was Green.   Interventions performed were N/A.    Severe Sepsis OR Septic Shock Diagnosis Present: No    To be done/followed up on inpatient unit:  N/A    ED NURSE PHONE NUMBER: [phone]

## 2019-07-16 ENCOUNTER — TELEPHONE (OUTPATIENT)
Dept: FAMILY MEDICINE | Facility: CLINIC | Age: 74
End: 2019-07-16

## 2019-07-16 LAB
ANION GAP SERPL CALCULATED.3IONS-SCNC: 8 MMOL/L (ref 3–14)
BUN SERPL-MCNC: 46 MG/DL (ref 7–30)
CALCIUM SERPL-MCNC: 8.4 MG/DL (ref 8.5–10.1)
CHLORIDE SERPL-SCNC: 115 MMOL/L (ref 94–109)
CO2 SERPL-SCNC: 19 MMOL/L (ref 20–32)
CREAT SERPL-MCNC: 1.81 MG/DL (ref 0.52–1.04)
ERYTHROCYTE [DISTWIDTH] IN BLOOD BY AUTOMATED COUNT: 14.3 % (ref 10–15)
GFR SERPL CREATININE-BSD FRML MDRD: 27 ML/MIN/{1.73_M2}
GLUCOSE BLDC GLUCOMTR-MCNC: 120 MG/DL (ref 70–99)
GLUCOSE BLDC GLUCOMTR-MCNC: 120 MG/DL (ref 70–99)
GLUCOSE BLDC GLUCOMTR-MCNC: 132 MG/DL (ref 70–99)
GLUCOSE BLDC GLUCOMTR-MCNC: 139 MG/DL (ref 70–99)
GLUCOSE BLDC GLUCOMTR-MCNC: 67 MG/DL (ref 70–99)
GLUCOSE BLDC GLUCOMTR-MCNC: 96 MG/DL (ref 70–99)
GLUCOSE SERPL-MCNC: 71 MG/DL (ref 70–99)
HCT VFR BLD AUTO: 33.1 % (ref 35–47)
HGB BLD-MCNC: 10.3 G/DL (ref 11.7–15.7)
LEVETIRACETAM SERPL-MCNC: 83 UG/ML (ref 12–46)
MCH RBC QN AUTO: 28.6 PG (ref 26.5–33)
MCHC RBC AUTO-ENTMCNC: 31.1 G/DL (ref 31.5–36.5)
MCV RBC AUTO: 92 FL (ref 78–100)
PLATELET # BLD AUTO: 150 10E9/L (ref 150–450)
POTASSIUM SERPL-SCNC: 4.1 MMOL/L (ref 3.4–5.3)
RBC # BLD AUTO: 3.6 10E12/L (ref 3.8–5.2)
SODIUM SERPL-SCNC: 142 MMOL/L (ref 133–144)
TACROLIMUS BLD-MCNC: 4.8 UG/L (ref 5–15)
TME LAST DOSE: ABNORMAL H
WBC # BLD AUTO: 8.3 10E9/L (ref 4–11)

## 2019-07-16 PROCEDURE — 95816 EEG AWAKE AND DROWSY: CPT

## 2019-07-16 PROCEDURE — G0463 HOSPITAL OUTPT CLINIC VISIT: HCPCS

## 2019-07-16 PROCEDURE — 85027 COMPLETE CBC AUTOMATED: CPT | Performed by: STUDENT IN AN ORGANIZED HEALTH CARE EDUCATION/TRAINING PROGRAM

## 2019-07-16 PROCEDURE — 25800030 ZZH RX IP 258 OP 636: Performed by: STUDENT IN AN ORGANIZED HEALTH CARE EDUCATION/TRAINING PROGRAM

## 2019-07-16 PROCEDURE — 25000128 H RX IP 250 OP 636: Performed by: STUDENT IN AN ORGANIZED HEALTH CARE EDUCATION/TRAINING PROGRAM

## 2019-07-16 PROCEDURE — 00000146 ZZHCL STATISTIC GLUCOSE BY METER IP

## 2019-07-16 PROCEDURE — 25000132 ZZH RX MED GY IP 250 OP 250 PS 637: Mod: GY | Performed by: STUDENT IN AN ORGANIZED HEALTH CARE EDUCATION/TRAINING PROGRAM

## 2019-07-16 PROCEDURE — 36415 COLL VENOUS BLD VENIPUNCTURE: CPT | Performed by: STUDENT IN AN ORGANIZED HEALTH CARE EDUCATION/TRAINING PROGRAM

## 2019-07-16 PROCEDURE — 40000061 ZZH STATISTIC EEG TIME EA 10 MIN

## 2019-07-16 PROCEDURE — 25000131 ZZH RX MED GY IP 250 OP 636 PS 637: Mod: GY | Performed by: STUDENT IN AN ORGANIZED HEALTH CARE EDUCATION/TRAINING PROGRAM

## 2019-07-16 PROCEDURE — 12000000 ZZH R&B MED SURG/OB

## 2019-07-16 PROCEDURE — 80048 BASIC METABOLIC PNL TOTAL CA: CPT | Performed by: STUDENT IN AN ORGANIZED HEALTH CARE EDUCATION/TRAINING PROGRAM

## 2019-07-16 PROCEDURE — 99232 SBSQ HOSP IP/OBS MODERATE 35: CPT | Performed by: STUDENT IN AN ORGANIZED HEALTH CARE EDUCATION/TRAINING PROGRAM

## 2019-07-16 RX ORDER — FAMOTIDINE 20 MG/1
20 TABLET, FILM COATED ORAL DAILY
Status: DISCONTINUED | OUTPATIENT
Start: 2019-07-17 | End: 2019-07-21 | Stop reason: HOSPADM

## 2019-07-16 RX ADMIN — LEVETIRACETAM 750 MG: 750 TABLET, FILM COATED ORAL at 08:25

## 2019-07-16 RX ADMIN — TACROLIMUS 2 MG: 1 CAPSULE ORAL at 08:24

## 2019-07-16 RX ADMIN — SODIUM CHLORIDE, PRESERVATIVE FREE: 5 INJECTION INTRAVENOUS at 21:17

## 2019-07-16 RX ADMIN — TACROLIMUS 2 MG: 1 CAPSULE ORAL at 21:11

## 2019-07-16 RX ADMIN — SODIUM CHLORIDE, PRESERVATIVE FREE: 5 INJECTION INTRAVENOUS at 10:30

## 2019-07-16 RX ADMIN — LEVOTHYROXINE SODIUM 50 MCG: 50 TABLET ORAL at 08:25

## 2019-07-16 RX ADMIN — ERTAPENEM SODIUM 1 G: 1 INJECTION, POWDER, LYOPHILIZED, FOR SOLUTION INTRAMUSCULAR; INTRAVENOUS at 17:29

## 2019-07-16 RX ADMIN — ASPIRIN 81 MG: 81 TABLET, COATED ORAL at 08:25

## 2019-07-16 RX ADMIN — PRAVASTATIN SODIUM 10 MG: 10 TABLET ORAL at 08:24

## 2019-07-16 RX ADMIN — Medication 150 MG: at 20:07

## 2019-07-16 RX ADMIN — FAMOTIDINE 20 MG: 20 TABLET, FILM COATED ORAL at 08:25

## 2019-07-16 RX ADMIN — INSULIN GLARGINE 10 UNITS: 100 INJECTION, SOLUTION SUBCUTANEOUS at 21:18

## 2019-07-16 RX ADMIN — PREDNISONE 5 MG: 5 TABLET ORAL at 08:25

## 2019-07-16 RX ADMIN — Medication 150 MG: at 08:24

## 2019-07-16 ASSESSMENT — ACTIVITIES OF DAILY LIVING (ADL)
ADLS_ACUITY_SCORE: 30

## 2019-07-16 NOTE — PROCEDURES
Procedure Date: 2019      ELECTROENCEPHALOGRAM       ORDERING PROVIDER:  Tacos Chambers MD       EEG #  portable.        HISTORY:  The patient presents with prolonged confusion, history of seizure disorder, recent urinary tract infection, end-stage renal disease.      EEG FINDINGS:  The patient was noted to be awake for initial portions of the recording.  The background contained 7-8 Hz activity, which was predominant in the posterior head region.  At times when the patient was noted to be drowsy, there was further slowing of the background in a nonlocalizing pattern over both hemispheres.  No epileptiform or paroxysmal abnormalities were noted during the recording.  An EKG monitored at the time of the recording revealed sinus rhythm.  Hyperventilation and photic stimulation were not performed.      IMPRESSION:  This EEG is consistent with moderate generalized encephalopathy.  This is nonspecific in etiology with diagnostic possibilities including toxic, metabolic disturbances or other abnormalities of generalized cerebral function.  No epileptic discharges were noted during this recording.         TACOS CHAMBERS MD             D: 2019   T: 2019   MT: KASI      Name:     MARYJANE SHAVER   MRN:      [id number]        Account:        [id number]   :      1945           Procedure Date: 2019      Document: [id number]

## 2019-07-16 NOTE — CONSULTS
[facility]    Infectious Disease Consultation     Date of Admission:  7/15/2019  Date of Consult (When I saw the patient): 07/16/19    Assessment & Plan   Gem Jade is a 73 year old female who was admitted on 7/15/2019.     Impression:  1. 73 y.o female familiar to me from numerous prior admission with similar presentation.   2. History of renal transplant, chronic indwelling rico. On immunosuppressives.   3. Diabetes.   4. Previous history of MDR.   5. Admitted again with altered mental status.   6. UA grossly abnormal.   7. UC pending.   8. On ertapenem.     Recommendations:   Continue on ertapenem   Follow up on the pending cultures       Paramjit Mckenzie MD    Reason for Consult   Reason for consult: I was asked by Dr. Kilpatrick to evaluate this patient for possible UTI .    Primary Care Physician   Marivel Barcenas NP    Chief Complaint   Altered mental status     History is obtained from the patient and medical records    History of Present Illness    From HPI   Gem Jade is a 73 year old female with past medical history of renal transplant, chronic indwelling Rico catheter, seizure disorder, type 2 diabetes mellitus, chronic kidney disease, VRE/ESBL, hypertension who presents for evaluation of altered mental status.     Patient's home care assistant was replacing her Rico catheter today when  it was reported that the patient became unresponsive.  EMS was activated, and when they arrived they gave the patient a sternal rub for which she only showed minimal response to.  Her blood sugars were found to be 184.  They did try Narcan 100 mg, with some improvement in her mentation.  She is at baseline not on narcotics.     Patient herself does not recall losing consciousness.  She does report that she feels like she is having a recurrent UTI.  She endorses 1 day history of diarrhea, which is not new for her.  She denies any fevers, any chest pain or shortness of breath.  She denies  any abdominal pain.  She reports that when she does have seizure she does become unconscious.  She is unable to recall the last time she had a seizure.  She reports that she has been compliant with her Keppra, and has been eating and drinking at her baseline.  She denies any recent blood in her stool.  She does endorse some nausea, with minimal vomiting over the last few days.  She otherwise has no other complaints at this time.    Past Medical History   I have reviewed this patient's medical history and updated it with pertinent information if needed.   Past Medical History:   Diagnosis Date     Background diabetic retinopathy(362.01)     extensive diabetic retinopathy, s/p multiple laser treatments     Diabetic gastroparesis (H)     followed by [facility] (Dr. 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  Past Surgical History:   Procedure Laterality Date     C NONSPECIFIC PROCEDURE  10/99    kidney transplant     C NONSPECIFIC PROCEDURE  8/00    MRI head, lumbar spine, pelvis     C TOTAL KNEE ARTHROPLASTY  3/03    Knee Replacement, Total right, post op PE     CHOLECYSTECTOMY       HC LEFT HEART CATHETERIZATION  1999    Cardiac Cath, Left Heart, Negative  (@ [facility])     HC LEFT HEART  CATHETERIZATION  4/05    normal coronary angiogram at [facility], had mild troponin rise (0.71)     HC REMV CATARACT EXTRACAP,INSERT LENS  8/04    bilateral cataract repaired, left eye        Prior to Admission Medications   Prior to Admission Medications   Prescriptions Last Dose Informant Patient Reported? Taking?   ACETAMINOPHEN PO prn Other Yes Yes   Sig: Take 325-650 mg by mouth every 4 hours as needed.   COMFORT EZ PEN NEEDLES 31G X 6 MM miscellaneous  Other No No   Sig: USE 4 DAILY OR AS DIRECTED   Cranberry 400 MG TABS 7/14/2019 at Unknown time Other Yes Yes   Sig: Take 400 mg by mouth 2 times daily   Lactobacillus Rhamnosus, GG, (CULTURELL) capsule 7/14/2019 at Unknown time Other No Yes   Sig: Take 1 capsule by mouth 2 times daily   VITAMIN D, CHOLECALCIFEROL, PO 7/14/2019 at Unknown time Other Yes Yes   Sig: Take 4,000 Units by mouth 2 times daily   amLODIPine (NORVASC) 5 MG tablet 7/14/2019 at Unknown time Other No Yes   Sig: Take 2 tablets (10 mg) by mouth daily   aspirin 81 MG tablet 7/14/2019 at Unknown time Other Yes Yes   Sig: Take 81 mg by mouth daily   baclofen (LIORESAL) 10 MG tablet Past Week at Unknown time Other No Yes   Sig: Take 1 tablet (10 mg) by mouth 2 times daily as needed for other (Bladder Spasms)   blood glucose (ACCU-CHEK COMPACT PLUS) test strip  Other No No   Sig: USE TO TEST BLOOD SUGAR THREE TIMES A DAY   blood glucose monitoring (NO BRAND SPECIFIED) meter device kit   No No   Sig: Use to test blood sugar three times daily or as directed.   cyanocobalamin (CYANOCOBALAMIN) 1000 MCG/ML injection   No Yes   Sig: Inject 1 mL (1,000 mcg) into the muscle every 2 months   diphenoxylate-atropine (LOMOTIL) 2.5-0.025 MG tablet Past Week at Unknown time  No Yes   Sig: Take 1-2 tablets by mouth 4 times daily as needed for diarrhea   famotidine (PEPCID) 20 MG tablet 7/14/2019 at Unknown time Other Yes Yes   Sig: Take 1 tablet (20 mg) by mouth 2 times daily   gabapentin (NEURONTIN) 300 MG capsule  "7/14/2019 at Unknown time  Yes Yes   Sig: Take 600 mg by mouth daily   gabapentin (NEURONTIN) 300 MG capsule 7/14/2019 at Unknown time  Yes Yes   Sig: Take 900 mg by mouth every evening   insulin aspart (NOVOLOG FLEXPEN) 100 UNIT/ML pen Past Week at Unknown time Other Yes Yes   Sig: Take 10 units with meals + sliding scale if eating. Sliding scale only at bedtime when not eating. Max 40 units per day  Sliding scale:   140-199 2u  200-249 4u  250-299 6u  300-349 8u  350-399 10u  Over 400 12u   insulin glargine (LANTUS SOLOSTAR PEN) 100 UNIT/ML pen 7/13/2019 Other Yes Yes   Sig: Inject 30 Units Subcutaneous At Bedtime   labetalol (NORMODYNE) 300 MG tablet 7/14/2019 at Unknown time  Yes Yes   Sig: Take 0.5 tablets (150 mg) by mouth 2 times daily   levETIRAcetam (KEPPRA) 750 MG tablet 7/14/2019 at Unknown time Other No Yes   Sig: Take 1 tablet (750 mg) by mouth 2 times daily   levothyroxine (SYNTHROID/LEVOTHROID) 50 MCG tablet 7/14/2019 at Unknown time Other No Yes   Sig: TAKE ONE TABLET BY MOUTH EVERY DAY   lidocaine (XYLOCAINE) 2 % topical gel  Other No No   Sig: Apply topically as needed for moderate pain 10 ML every 3 weeks   loratadine (CLARITIN) 10 MG tablet 7/14/2019 at Unknown time Other No Yes   Sig: Take 1 tablet (10 mg) by mouth daily   menthol-zinc oxide (CALMOSEPTINE) 0.44-20.625 % OINT ointment  Other Yes No   Sig: Apply topically 4 times daily as needed for skin protection   ondansetron (ZOFRAN ODT) 4 MG ODT tab prn Other No Yes   Sig: Take 1 tablet (4 mg) by mouth daily as needed for nausea 30 minutes before getting up in your wheelchair   order for DME  Other No No   Sig: Equipment being ordered: [employer] gel-T cushion 20 inches wide & 18 inches deep    height 1.676 m (5' 6\"), weight 101.6 kg (223 lb 15.8 oz)   order for DME  Other No No   Sig: Equipment being ordered: Tegaderm 4x4   MICHELLE 6 months  Sacral wound   pravastatin (PRAVACHOL) 10 MG tablet 7/14/2019 at Unknown time Other No Yes   Sig: Take " 1 tablet (10 mg) by mouth daily   prazosin (MINIPRESS) 1 MG capsule Past Month at Unknown time  No Yes   Sig: Take 2 capsules (2 mg) by mouth At Bedtime   Patient taking differently: Take 1-2 mg by mouth nightly as needed    predniSONE (DELTASONE) 5 MG tablet 7/14/2019 at Unknown time Other No Yes   Sig: Take 1 tablet (5 mg) by mouth daily   simethicone (MYLICON) 125 MG CHEW prn Other Yes Yes   Sig: Take 1 tablet (125 mg) by mouth as needed for intestinal gas   tacrolimus (GENERIC EQUIVALENT) 1 MG capsule 7/14/2019 at Unknown time Other No Yes   Sig: Take 2 capsules (2 mg) by mouth 2 times daily      Facility-Administered Medications: None     Allergies   Allergies   Allergen Reactions     Atorvastatin Calcium      myalgias, muscle aches     Codeine Nausea and Vomiting     Darvocet [Propoxyphene N-Apap] Nausea and Vomiting     Hydromorphone      Levofloxacin Other (See Comments) and Diarrhea     hallucinations     Morphine      Nausea and vomiting     Niaspan [Niacin] GI Disturbance     Flushing even at low dose, GI upset     Percocet [Oxycodone-Acetaminophen]      Vomiting after taking med couple of times     Vicodin [Hydrocodone-Acetaminophen] Nausea and Vomiting       Immunization History   Immunization History   Administered Date(s) Administered     FLU 6-35 months 11/05/2011     Flu, Unspecified 10/13/1997     HepB 01/13/2003     HepB-Adult 01/13/2003     Influenza (H1N1) 11/25/2009     Influenza (High Dose) 3 valent vaccine 12/01/2015, 10/07/2016     Influenza (IIV3) PF 11/30/2000, 11/04/2002, 11/14/2003, 10/22/2004, 11/03/2005, 12/04/2006, 11/12/2007, 10/21/2008, 10/06/2009, 11/09/2010, 11/05/2011     Influenza Vaccine IM 3yrs+ 4 Valent IIV4 10/16/2013     Pneumo Conj 13-V (2010&after) 12/01/2015     Pneumococcal 23 valent 06/17/2013     TDAP Vaccine (Adacel) 06/17/2013     Zoster vaccine, live 06/17/2013       Social History   I have reviewed this patient's social history and updated it with pertinent  information if needed. Gem Jade  reports that she quit smoking about 32 years ago. She quit after 17.00 years of use. She has never used smokeless tobacco. She reports that she does not drink alcohol or use drugs.    Family History   I have reviewed this patient's family history and updated it with pertinent information if needed.   Family History   Problem Relation Age of Onset     Diabetes Mother      Parkinsonism Mother      Diabetes Brother      Hypertension Father         [id number] pneumonia     Heart Disease Father        Review of Systems   The 10 point Review of Systems is negative other than noted in the HPI or here.     Physical Exam   Temp: 98.3  F (36.8  C) Temp src: Oral BP: 154/64 Pulse: 65 Heart Rate: 68 Resp: 18 SpO2: 96 % O2 Device: None (Room air)    Vital Signs with Ranges  Temp:  [97.9  F (36.6  C)-98.3  F (36.8  C)] 98.3  F (36.8  C)  Pulse:  [65-71] 65  Heart Rate:  [67-68] 68  Resp:  [13-30] 18  BP: (140-180)/() 154/64  SpO2:  [94 %-96 %] 96 %  227 lbs 11.76 oz  Body mass index is 35.67 kg/m .    GENERAL APPEARANCE:  alert and no distress  EYES: Eyes grossly normal to inspection, PERRL and conjunctivae and sclerae normal  HENT: ear canals and TM's normal and nose and mouth without ulcers or lesions  NECK: no adenopathy, no asymmetry, masses, or scars and thyroid normal to palpation  RESP: lungs clear to auscultation - no rales, rhonchi or wheezes  CV: regular rates and rhythm, normal S1 S2, no S3 or S4 and no murmur, click or rub  LYMPHATICS: normal ant/post cervical and supraclavicular nodes  ABDOMEN: soft, nontender, without hepatosplenomegaly or masses and bowel sounds normal  MS: extremities normal- no gross deformities noted  SKIN: no suspicious lesions or rashes      Data   Lab Results   Component Value Date    WBC 8.3 07/16/2019    HGB 10.3 (L) 07/16/2019    HCT 33.1 (L) 07/16/2019     07/16/2019     07/16/2019    POTASSIUM 4.1 07/16/2019    CHLORIDE 115 (H)  07/16/2019    CO2 19 (L) 07/16/2019    BUN 46 (H) 07/16/2019    CR 1.81 (H) 07/16/2019    GLC 71 07/16/2019    SED 53 (H) 05/25/2016    NTBNPI 660 01/10/2016    TROPONIN 0.00 02/28/2019    TROPI <0.015 06/02/2019    AST 16 07/15/2019    ALT 18 07/15/2019    ALKPHOS 113 07/15/2019    BILITOTAL 0.3 07/15/2019    NOREEN <10 (L) 05/29/2018    INR 1.09 02/28/2019     Recent Labs   Lab 07/15/19  1536 07/15/19  1535   CULT No growth after 11 hours No growth after 11 hours  PENDING     Recent Labs   Lab Test 07/15/19  1536 07/15/19  1535 06/04/19  0738 06/02/19  2122 06/02/19  2111 06/02/19  2028 05/10/19  1205 04/24/19  1405 04/24/19  1258   CULT No growth after 11 hours No growth after 11 hours  PENDING No growth >100,000 colonies/mL  Citrobacter freundii complex  *  >100,000 colonies/mL  Staphylococcus aureus  * Cultured on the 1st day of incubation:  Staphylococcus capitis  This isolate DOES NOT demonstrate inducible clindamycin resistance in vitro. Clindamycin   is susceptible and could be used when indicated, however, erythromycin is resistant and   should not be used.  *  Critical Value/Significant Value, preliminary result only, called to and read back by  Jyoti Brower RN on 06.03.2019 at 2151. JRT    (Note)  POSITIVE for Staphylococci other than S.aureus, S.epidermidis and  S.lugdunensis, by "[employer]"igene multiplex nucleic acid test.  Coagulase-negative staphylococci are the most common venipuncture or  collection associated skin CONTAMINANTS grown in blood cultures.  Final identification and antimicrobial susceptibility testing will be  verified by standard methods.    Specimen tested with Verigene multiplex, gram-positive blood culture  nucleic acid test for the following targets: Staph aureus, Staph  epidermidis, Staph lugdunensis, other Staph species, Enterococcus  faecalis, Enterococcus faecium, Streptococcus species, S. agalactiae,  S. anginosus grp., S. pneumoniae, S. pyogenes, Listeria sp.,  mecA  (methicillin resistance) and Viridiana/B (vancomycin resistance).    Critical Value/Significant Value called to and read back by  DENNIS RAMIREZ RN ([id number]).  06.04.19  0028 GJS   No growth >100,000 colonies/mL  Citrobacter freundii complex  * >100,000 colonies/mL  Citrobacter freundii complex  * No growth  No growth       Amount of time performed on this consult: 45 minutes. This includes face to face assessment and care coordination with the primary team.

## 2019-07-16 NOTE — PROGRESS NOTES
[facility]  Patient is currently open to home care services with [facility]. The patient is currently receiving Skilled Nursing and HHA services. [facility]  and team have been notified of patient admission. [facility] liaison will continue to follow patient during stay. If appropriate provide orders to resume home care at time of discharge.

## 2019-07-16 NOTE — PLAN OF CARE
DATE & TIME: 7/16/19 [id number]                Cognitive Concerns/ Orientation : Confused, occ visual hallucinations, not at base per       BEHAVIOR & AGGRESSION TOOL COLOR: Green, mostly cooperative. Sometimes refusing services/cares, needs to be reapproached multiple times   CIWA SCORE: NA    ABNL VS/O2: VSS on RA, CPAP at hc     MOBILITY: Lift, Turned q2h   PAIN MANAGMENT: occ pain to bottom, resolves with repositioning    DIET: Mod carb, poor PO  BOWEL/BLADDER: chronic rico catheter, incontinent of bowel overnight   ABNL LAB/BG: am BG 67 (juice given)/96/132, lantus decreased, BUN 53, Creat 1.98  DRAIN/DEVICES: PIV in left arm infusing  TELEMETRY RHYTHM: NA  SKIN: wound on right great toe, blanchable redness to coccyx and thighs. WOC-RN consult done, would need Podiatry to assess now  TESTS/PROCEDURES: EEG done today per Neurology  D/C DAY/GOALS/PLACE: cont on IV abx per ID (UC pending), home with assist when infection resolves and cognition back to base   OTHER IMPORTANT INFO: keppra level came elevated, order to hold it.Contact Iso MRSA, VRE and ESBL

## 2019-07-16 NOTE — PROGRESS NOTES
EEG [id number] completed. Patient awake, alert, oriented, cooperative, drowsy, during hookup though did not want test from the the beginning.  No HV/Photic performed due to age/health.  Ordered by Dr. Jacques.

## 2019-07-16 NOTE — PLAN OF CARE
DATE & TIME: 7/16/19                      Cognitive Concerns/ Orientation : Alert and Oriented x4, but very paranoid.     BEHAVIOR & AGGRESSION TOOL COLOR: Green  CIWA SCORE: NA    ABNL VS/O2: VSS on RA, patient refusing facility CPAP and doesn't have a mask from home.    MOBILITY: Total Assist   PAIN MANAGMENT: Denies pain   DIET: Reg  BOWEL/BLADDER: Johnson Catheter, incontinent of bowel  ABNL LAB/BG: , BUN 53, Creat 1.98  DRAIN/DEVICES: PIV in left arm  TELEMETRY RHYTHM: NA  SKIN: wound on right great toe, blanchable redness to coccyx and thighs  TESTS/PROCEDURES: EEG today  D/C DAY/GOALS/PLACE: Pending   OTHER IMPORTANT INFO: Patient cooperative with cares.  Contact Iso MRSA, VRE and ESBL

## 2019-07-16 NOTE — PROGRESS NOTES
"DATE & TIME: 7094-1517 7/15/19    Cognitive Concerns/ Orientation : A/Ox2   BEHAVIOR & AGGRESSION TOOL COLOR: YELLOW  CIWA SCORE:    ABNL VS/O2: VSS. RA  MOBILITY: Ax2; lift team; T/R  PAIN MANAGMENT: denies at this time   DIET: CHO   BOWEL/BLADDER: rico patent   ABNL LAB/BG:   DRAIN/DEVICES: NS @100  TELEMETRY RHYTHM:   SKIN: Wound on rt big toe; dressing CDI. Bottom and back of upper thighs redness; open to air   TESTS/PROCEDURES:   D/C DAY/GOALS/PLACE: Discharge TBD   OTHER IMPORTANT INFO: pt is displaying paranoid thoughts, did not want the nurse (me) or the NA or any other female nurses at the time to help her because she did not \"trust us\" said we were tapping her phone line.       "

## 2019-07-16 NOTE — PROGRESS NOTES
WO Nurse Inpatient Wound Assessment   Reason for consultation: Evaluate and treat right great toe wound     Assessment  Right great toe wound due to unclear etiology, likely pressure/friction; pt with neuropathy.    Toe is reddened and there is purulent drainage coming from underneath an adherent scab/eschar.  Concern for deeper infection.  Recommend Podiatry eval.     Buttocks/perineal area reddened, posterior thighs denuded, pt bed bound and would benefit from air mattress.      Treatment Plan  Right great toe wound: Daily until Podiatry eval:  Cleanse with MicroKlenz, cover with PolyMem.  Label time/date/initials.    Orders Written  Recommended provider order: Podiatry consult  WO Nurse follow-up plan:weekly and prn  Nursing to notify the Provider(s) and re-consult the WO Nurse if wound(s) deteriorates or new skin concern.    Patient History  According to provider note(s):  Gem Jade is a 73 year old female with past medical history of renal transplant, chronic indwelling Johnson catheter, seizure disorder, type 2 diabetes mellitus, chronic kidney disease, VRE/ESBL, hypertension who presents for evaluation of altered mental status.    Objective Data  Containment of urine/stool: Diaper, Incontinence Protocol and Indwelling catheter    Active Diet Order  Orders Placed This Encounter      Moderate Consistent CHO Diet    Output:   I/O last 3 completed shifts:  In: -   Out: 1425 [Urine:1425]    Risk Assessment:   Sensory Perception: 3-->slightly limited  Moisture: 3-->occasionally moist  Activity: 2-->chairfast  Mobility: 2-->very limited  Nutrition: 3-->adequate  Friction and Shear: 2-->potential problem  Albino Score: 15                          Labs:   Recent Labs   Lab 07/16/19  0848 07/15/19  1546   ALBUMIN  --  3.1*   HGB 10.3* 11.4*   WBC 8.3 8.4       Physical Exam  Skin inspection: focused right great toe    Wound Location:  Right dorsal great toe  Date of last photo 7-16-19        Wound History:  unclear duration or etiology.  Pt had thought was related to her having ingrown toenails clipped, but this does not correlate clinically.    Measurements (length x width x depth, in cm) 1 x 1 x ?cm, unable to tell depth   Wound Base: dark red crusty thin scab/eschar, lifting at edges, held down by yellowish adherent tissue   Tunneling unclear  Undermining unclear  Palpation of the wound bed: slightly fluctuant  Periwound skin: edematous and erythema- blanchable  Color: pink  Temperature: normal   Drainage: small, expressed easily with palpation  Description of drainage: purulent tan creamy blood-tinged  Odor: none  Pain: minimal, pt with neuropathy    Buttocks/coccyx:  Briefly assessed, hard to fully visualize due to barrier paste residue, but appears intact, mild blanchable erythema.  Perianal area and posterior upper and inner thighs with mild pink denudement.  Pt able to roll with heavy assist of 1.  Pt basically bedbound at baseline.     Groin/pannus folds with scattered mild pink denudement      Interventions  Current support surface: Standard  Atmos Air mattress  - pulsate ordered today 7-16  Current off-loading measures: Pillows under calves  Visual inspection of wound(s) completed  Wound Care: done per plan of care  Supplies: reviewed  Education provided: importance of repositioning, plan of care, wound progress and Infection prevention   Discussed plan of care with Patient and Nurse    Emmy Sifuentes RN

## 2019-07-16 NOTE — TELEPHONE ENCOUNTER
Faxed home care plan to [phone]. Documents sent to abstract for scanning.    Theodore Galindo MA

## 2019-07-16 NOTE — CONSULTS
[facility]  [facility]    Neurology Consultation    Gem Jade MRN# [id number]   YOB: 1945 Age: 73 year old    Code Status:Full Code   Date of Admission: 7/15/2019  Date of Consult:07/16/2019                                                                                       Assessment and Plan:                                         #.  Episode of loss of consciousness.  Now recovered.  Differential diagnosis would include nonconvulsive seizure versus Toxic encephalopathy from UTI.    --As she is now recovered, no further testing is indicated.  Should continue to improve with treatment of UTI  Continue same dose of levetiracetam as prior to admission.  Given patient decreased renal function, may need lower dose of levetiracetam.  If level obtained on admission (which is currently pending)is high, dose may need to be reduced.    Patient reports she was previously followed by neurologist at the  although I do not see record of who that was.  She should return to the care of that neurologist for follow-up monitoring of levetiracetam.    Please call if neurology follow up needed.         ----------------------------------------------------------------------------------  ----------------------------------------------------------------------------------  Reason for consult: I was asked by Dr. Jacques to evaluate this patient for encephalopathy.       Chief Complaint:   Patient does not recall the event yesterday  History is obtained from the patient / chart       History of Present Illness:   This patient is a 73 year old female who presents with alteration of consciousness/unresponsiveness yesterday.  Admission note reviewed.  Patient was noted to become unresponsive during the procedure adjusting her catheter yesterday.  She was noted by EMS to be only minimally responsive to a sternal rub. gradual recovery in the emergency room.  She was diagnosed  with a urinary tract infection and has been started on antibiotics.    On review of records, the patient has had intermittent unresponsive episodes since December 2017.  Most of these have been associated with urinary tract infection.  In January 2018, records reveal that she did have abnormal EEG indicating seizure activity.  She has been maintained on levetiracetam since that time.    Overnight, she is improved    Patient was followed by me in years past for peripheral neuropathy pain.  She states that she was advised to switch care to [facility] to keep all doctors in the same place.        Past Medical History:     Past Medical History:   Diagnosis Date     Background diabetic retinopathy(362.01)     extensive diabetic retinopathy, s/p multiple laser treatments     Diabetic gastroparesis (H)     followed by [facility] (Dr. 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)      Urinary tract infection, site not specified     Chronic UTIs     UTI (urinary tract infection) due to urinary indwelling catheter (H)          Past Surgical History:     Past Surgical History:   Procedure Laterality Date     C NONSPECIFIC PROCEDURE  10/99    kidney transplant     C NONSPECIFIC PROCEDURE  8/00    MRI head, lumbar spine, pelvis     C TOTAL KNEE ARTHROPLASTY  3/03    Knee Replacement, Total right, post op PE     CHOLECYSTECTOMY       HC LEFT HEART CATHETERIZATION  1999    Cardiac Cath, Left Heart, Negative  (@ [facility])     HC LEFT HEART CATHETERIZATION  4/05    normal coronary  angiogram at [facility], had mild troponin rise (0.71)     HC REMV CATARACT EXTRACAP,INSERT LENS      bilateral cataract repaired, left eye           Social History:     Social History     Socioeconomic History     Marital status:      Spouse name: Not on file     Number of children: Not on file     Years of education: Not on file     Highest education level: Not on file   Occupational History     Not on file   Social Needs     Financial resource strain: Not on file     Food insecurity:     Worry: Not on file     Inability: Not on file     Transportation needs:     Medical: Not on file     Non-medical: Not on file   Tobacco Use     Smoking status: Former Smoker     Years: 17.00     Last attempt to quit: 1987     Years since quittin.1     Smokeless tobacco: Never Used   Substance and Sexual Activity     Alcohol use: No     Drug use: No     Sexual activity: Yes     Partners: Male   Lifestyle     Physical activity:     Days per week: Not on file     Minutes per session: Not on file     Stress: Not on file   Relationships     Social connections:     Talks on phone: Not on file     Gets together: Not on file     Attends Restorationism service: Not on file     Active member of club or organization: Not on file     Attends meetings of clubs or organizations: Not on file     Relationship status: Not on file     Intimate partner violence:     Fear of current or ex partner: Not on file     Emotionally abused: Not on file     Physically abused: Not on file     Forced sexual activity: Not on file   Other Topics Concern     Not on file   Social History Narrative    , lives with , she is bed bound (non-ambulatory) for last several years.  She has had a rico for > 10 years.  She is a full code as discussed with .  (last updated 2018)      Patient denies smoking, no significant alcohol intake, denies illicit drugs use       Family History:     Family History   Problem Relation Age of Onset      Diabetes Mother      Parkinsonism Mother      Diabetes Brother      Hypertension Father         [id number] pneumonia     Heart Disease Father      Reviewed and not felt to be contributory.       Home Medications:     Prior to Admission Medications   Prescriptions Last Dose Informant Patient Reported? Taking?   ACETAMINOPHEN PO prn Other Yes Yes   Sig: Take 325-650 mg by mouth every 4 hours as needed.   COMFORT EZ PEN NEEDLES 31G X 6 MM miscellaneous  Other No No   Sig: USE 4 DAILY OR AS DIRECTED   Cranberry 400 MG TABS 7/14/2019 at Unknown time Other Yes Yes   Sig: Take 400 mg by mouth 2 times daily   Lactobacillus Rhamnosus, GG, (CULTURELL) capsule 7/14/2019 at Unknown time Other No Yes   Sig: Take 1 capsule by mouth 2 times daily   VITAMIN D, CHOLECALCIFEROL, PO 7/14/2019 at Unknown time Other Yes Yes   Sig: Take 4,000 Units by mouth 2 times daily   amLODIPine (NORVASC) 5 MG tablet 7/14/2019 at Unknown time Other No Yes   Sig: Take 2 tablets (10 mg) by mouth daily   aspirin 81 MG tablet 7/14/2019 at Unknown time Other Yes Yes   Sig: Take 81 mg by mouth daily   baclofen (LIORESAL) 10 MG tablet Past Week at Unknown time Other No Yes   Sig: Take 1 tablet (10 mg) by mouth 2 times daily as needed for other (Bladder Spasms)   blood glucose (ACCU-CHEK COMPACT PLUS) test strip  Other No No   Sig: USE TO TEST BLOOD SUGAR THREE TIMES A DAY   blood glucose monitoring (NO BRAND SPECIFIED) meter device kit   No No   Sig: Use to test blood sugar three times daily or as directed.   cyanocobalamin (CYANOCOBALAMIN) 1000 MCG/ML injection   No Yes   Sig: Inject 1 mL (1,000 mcg) into the muscle every 2 months   diphenoxylate-atropine (LOMOTIL) 2.5-0.025 MG tablet Past Week at Unknown time  No Yes   Sig: Take 1-2 tablets by mouth 4 times daily as needed for diarrhea   famotidine (PEPCID) 20 MG tablet 7/14/2019 at Unknown time Other Yes Yes   Sig: Take 1 tablet (20 mg) by mouth 2 times daily   gabapentin (NEURONTIN) 300 MG capsule  "7/14/2019 at Unknown time  Yes Yes   Sig: Take 600 mg by mouth daily   gabapentin (NEURONTIN) 300 MG capsule 7/14/2019 at Unknown time  Yes Yes   Sig: Take 900 mg by mouth every evening   insulin aspart (NOVOLOG FLEXPEN) 100 UNIT/ML pen Past Week at Unknown time Other Yes Yes   Sig: Take 10 units with meals + sliding scale if eating. Sliding scale only at bedtime when not eating. Max 40 units per day  Sliding scale:   140-199 2u  200-249 4u  250-299 6u  300-349 8u  350-399 10u  Over 400 12u   insulin glargine (LANTUS SOLOSTAR PEN) 100 UNIT/ML pen 7/13/2019 Other Yes Yes   Sig: Inject 30 Units Subcutaneous At Bedtime   labetalol (NORMODYNE) 300 MG tablet 7/14/2019 at Unknown time  Yes Yes   Sig: Take 0.5 tablets (150 mg) by mouth 2 times daily   levETIRAcetam (KEPPRA) 750 MG tablet 7/14/2019 at Unknown time Other No Yes   Sig: Take 1 tablet (750 mg) by mouth 2 times daily   levothyroxine (SYNTHROID/LEVOTHROID) 50 MCG tablet 7/14/2019 at Unknown time Other No Yes   Sig: TAKE ONE TABLET BY MOUTH EVERY DAY   lidocaine (XYLOCAINE) 2 % topical gel  Other No No   Sig: Apply topically as needed for moderate pain 10 ML every 3 weeks   loratadine (CLARITIN) 10 MG tablet 7/14/2019 at Unknown time Other No Yes   Sig: Take 1 tablet (10 mg) by mouth daily   menthol-zinc oxide (CALMOSEPTINE) 0.44-20.625 % OINT ointment  Other Yes No   Sig: Apply topically 4 times daily as needed for skin protection   ondansetron (ZOFRAN ODT) 4 MG ODT tab prn Other No Yes   Sig: Take 1 tablet (4 mg) by mouth daily as needed for nausea 30 minutes before getting up in your wheelchair   order for DME  Other No No   Sig: Equipment being ordered: [employer] gel-T cushion 20 inches wide & 18 inches deep    height 1.676 m (5' 6\"), weight 101.6 kg (223 lb 15.8 oz)   order for DME  Other No No   Sig: Equipment being ordered: Tegaderm 4x4   MICHELLE 6 months  Sacral wound   pravastatin (PRAVACHOL) 10 MG tablet 7/14/2019 at Unknown time Other No Yes   Sig: Take " 1 tablet (10 mg) by mouth daily   prazosin (MINIPRESS) 1 MG capsule Past Month at Unknown time  No Yes   Sig: Take 2 capsules (2 mg) by mouth At Bedtime   Patient taking differently: Take 1-2 mg by mouth nightly as needed    predniSONE (DELTASONE) 5 MG tablet 7/14/2019 at Unknown time Other No Yes   Sig: Take 1 tablet (5 mg) by mouth daily   simethicone (MYLICON) 125 MG CHEW prn Other Yes Yes   Sig: Take 1 tablet (125 mg) by mouth as needed for intestinal gas   tacrolimus (GENERIC EQUIVALENT) 1 MG capsule 7/14/2019 at Unknown time Other No Yes   Sig: Take 2 capsules (2 mg) by mouth 2 times daily      Facility-Administered Medications: None          Allergy:     Allergies   Allergen Reactions     Atorvastatin Calcium      myalgias, muscle aches     Codeine Nausea and Vomiting     Darvocet [Propoxyphene N-Apap] Nausea and Vomiting     Hydromorphone      Levofloxacin Other (See Comments) and Diarrhea     hallucinations     Morphine      Nausea and vomiting     Niaspan [Niacin] GI Disturbance     Flushing even at low dose, GI upset     Percocet [Oxycodone-Acetaminophen]      Vomiting after taking med couple of times     Vicodin [Hydrocodone-Acetaminophen] Nausea and Vomiting          Inpatient Medications:   Scheduled Meds:    aspirin  81 mg Oral Daily     ertapenem (INVanz) IV  1 g Intravenous Q24H     [START ON 7/17/2019] famotidine  20 mg Oral Daily     insulin aspart  1-3 Units Subcutaneous TID AC     insulin aspart  1-3 Units Subcutaneous At Bedtime     insulin glargine  15 Units Subcutaneous At Bedtime     labetalol  150 mg Oral BID     levETIRAcetam  750 mg Oral BID     levothyroxine  50 mcg Oral Daily     pravastatin  10 mg Oral Daily     predniSONE  5 mg Oral Daily     sodium chloride (PF)  3 mL Intracatheter Q8H     tacrolimus  2 mg Oral BID     PRN Meds: acetaminophen, baclofen, glucose **OR** dextrose **OR** glucagon, lidocaine 4%, lidocaine (buffered or not buffered), melatonin, naloxone, ondansetron **OR**  ondansetron, sodium chloride (PF)        Review of Systems    The Review of Systems is negative other than noted in the HPI  A comprehensive review of  10 systems was performed and found to be negative except as described in this note  CONSTITUTIONAL: negative for fever, chills, change in weight  INTEGUMENTARY/SKIN: no rash or obvious new lesions  ENT/MOUTH: no sore throat, new sinus pain or nasal drainage, no neck mass noted  RESP: No pleuretic pain, No cough, no hemoptysis, No SOB   CV: negative for chest pain, palpitations or peripheral edema  GI: no nausea, vomiting, change in stools  : no dysuria or hematuria  MUSCULOSKELETAL: no myalgias, arthralgias or join efffusion  ENDOCRINE: no history of polyuria, polydyspsia or symptoms of thyroid dysfunction  PSYCHIATRIC: no change in mood stable  LYMPHATIC: no new lymphadenopathy  HEME: no bleeding or easy bruisability  NEURO: see HPI       Physical Exam:   Physical Exam   Vitals:  Height:Data Unavailable  Weight:227 lbs 11.76 oz   Temp: 98.3  F (36.8  C) Temp src: Oral BP: 154/64 Pulse: 65 Heart Rate: 68 Resp: 18 SpO2: 96 % O2 Device: None (Room air)    General Appearance:  No acute distress overweight body habitus, bedbound  Neuro:       Mental Status Exam:   Alert and oriented to place and floor.  She does recognize me as her previous neurologist from many years ago.  Language and speech intact       Cranial Nerves:   2 through 12 intact, fundus technically difficult, pupils are equal and reactive to light       Motor:   Tone and bulk and strength are normal in the upper extremities.  Lower extremity paralysis-chronic, with atrophy and Charcot joints           Reflexes: Areflexic in the lower extremity, minimal reflexes upper extremity, plantar signs neutral       Sensory: Absent vibration sense in the left leg from the knee down, slight in the right knee, present in both hands.  Absent pinprick in both distal lower extremities below the knees                    Coordination:   Lack of movement in the legs consistent with chronic effects of peripheral neuropathy       Gait: Unable due to paralysis of the lower extremity  Neck: no nuchal rigidity, normal thyroid. No carotid bruits.    Cardiovascular: Regular rate and rhythm, no m/r/g    Extremities: No clubbing, no cyanosis, no edema       Data:   ROUTINE IP LABS   CBC RESULTS:     Recent Labs   Lab 07/16/19  0848 07/15/19  1546   WBC 8.3 8.4   RBC 3.60* 3.89   HGB 10.3* 11.4*   HCT 33.1* 36.3    200     Basic Metabolic Panel:   Recent Labs   Lab Test 07/16/19  0848 07/15/19  1546 07/01/19  1208    141 138   POTASSIUM 4.1 4.8 4.5   CHLORIDE 115* 109 111*   CO2 19* 23 25   BUN 46* 53* 52*   CR 1.81* 1.98* 1.68*   GLC 71 192* 127*   ROGER 8.4* 9.3 8.4*     Liver panel:  Recent Labs   Lab Test 07/15/19  1546 06/04/19  0739 06/02/19  2028 04/24/19  1258 02/28/19  0035   PROTTOTAL 8.5 7.3 8.2 7.5 7.8   ALBUMIN 3.1* 2.9* 3.2* 2.9* 3.2*   BILITOTAL 0.3 0.3 0.3 0.3 0.4   ALKPHOS 113 113 117 103 103   AST 16 14 17 11 31   ALT 18 21 28 22 31     INR:  Recent Labs   Lab Test 02/28/19  0035 11/06/17  1652 04/26/17  2338 04/26/17  0638 03/08/17  1243   INR 1.09 1.09 1.03 1.06 1.01      Lipid Profile:  Recent Labs   Lab Test 12/05/16  1210 02/05/15  1250 05/16/13  1240 06/16/11  1230   CHOL 164 106 244* 179   HDL 46* 43* 42* 50   LDL 64 18 149* 83   TRIG 272* 223* 262* 226*   CHOLHDLRATIO  --  2.5 6.0* 3.6     Thyroid Panel:  Recent Labs   Lab Test 07/01/19  1208 06/03/19  0730 05/29/18  0440 05/25/18  0900 01/19/18  0445  03/09/17  1310  05/26/16  0744  05/18/12 05/08/12   TSH 2.43 1.28 0.85 0.94 1.31   < > 1.20   < > 0.96   < > 2.07  --    T4 0.82  --   --   --   --   --  0.72*  --  1.00  --  2.8 0.4    < > = values in this interval not displayed.      Vitamin B12:   Recent Labs   Lab Test 06/18/19  1400 05/29/18  0440 03/09/17  1310   B12 801 1,383* 1,402*      Vitamin D level:   Recent Labs   Lab Test 01/02/18  1156  04/21/17  0645 08/30/16  0659 12/26/13  1300   VITDT 32 28 57 32     A1C:   Recent Labs   Lab Test 04/24/19  1258 02/28/19  0612 09/16/18  0751 05/26/18  0545 01/12/18  0648   A1C 6.2* 7.3* 8.2* 8.8* 6.3*     Troponin I:   Recent Labs   Lab Test 06/02/19  2028 04/24/19  1258 03/01/19  1119 03/01/19  0043 02/28/19  0035 07/05/18  1418 11/13/17  1613 04/19/17  0005   TROPI <0.015 <0.015 <0.015 <0.015 <0.015 <0.015 <0.015 <0.015  The 99th percentile for upper reference range is 0.045 ug/L.  Troponin values in   the range of 0.045 - 0.120 ug/L may be associated with risks of adverse   clinical events.       CRP inflammation:   Recent Labs   Lab Test 03/08/17  1243 08/31/16  0801 05/25/16  2116 02/18/16  1436 04/28/15  1650   CRP 7.5 39.0* <2.9 8.8* 145.0*     ESR:   Recent Labs   Lab Test 05/25/16  2116   SED 53*            IMAGING:   All imaging studies were reviewed personally  CT head:   -CT SCAN OF THE HEAD WITHOUT CONTRAST   7/15/2019 4:09 PM      HISTORY: Altered mental status (LOC), unexplained     TECHNIQUE:  Axial images of the head and coronal reformations without  IV contrast material. Radiation dose for this scan was reduced using  automated exposure control, adjustment of the mA and/or kV according  to patient size, or iterative reconstruction technique.     COMPARISON: Scan dated 6/2/2019.     FINDINGS:      Intracranial contents: There is diffuse parenchymal volume loss.   White matter changes are present in the cerebral hemispheres that are  consistent with small vessel ischemic disease in this age patient.  There is no evidence of intracranial hemorrhage, mass, acute infarct  or anomaly.     Visualized orbits/sinuses/mastoids:  The visualized portions of the  sinuses and mastoids appear normal.     Osseous structures/soft tissues:  There is no evidence of trauma.                                                                      IMPRESSION: No acute pathology, no bleed, mass, or areas of  acute  infarction are identified. No significant change

## 2019-07-16 NOTE — PROGRESS NOTES
[facility]    Medicine Progress Note - Hospitalist Service       Date of Admission:  7/15/2019  Date of Service: 2019    Assessment & Plan        Gem Jade is a 73 year old female who presents with decreased level of alertness.     Acute encephalopathy, secondary to catheter related urinary tract infection  Chronic indwelling Johnson  Assessment: History of frequent urinary tract infections associated with chronic indwelling Johnson catheter.  Most recent urine cultures 2019 and 5/10/2019 demonstrated greater than 100,000 colony-forming units of Citrobacter.  Has a history of VRE, MRSA, and ESBL. Note that patient has followed with infectious disease clinic in the past related to recurrent urinary tract infections with colonization. Wonder if AMS is second to recurrent UTI vs seizure??  Plan:  - Continue indwelling Johnson catheter for retention  - Follow UC/BC  - Continue IV Invanz  - IVF  - ID consulted  - Follow vitals/temp     Right Toe Redness/Swelling  Assessment: have concern about deep infection in addition to likely cellulitis  Plan:  - Podiatry consulted for tomorrow AM    Obstructive sleep apnea on CPAP:   -CPAP as per home settings      Type 2 diabetes with retinopathy, nephropathy, neuropathy including diabetic gastroparesis   Assessment: most recent A1c 6.2 on 2019. PTA on Glargine 30 U at bedtime.  Plan:  - Glargine reduced to 15 U at bedtime on admission, will reduce further to 10 U given minimal PO intake.  - sliding scale insulin as needed  - Hypoglycemia protocol    End-stage renal disease status post historic right lower quadrant renal transplant:   Assessment: Renal transplant in .  Uncertain if this was living or  donor. Cr on admission 1.98, elevated from baseline 1.54-1.82. Cr improving with IVF.  Plan:  - Tacrolimus level pending  - IVF  - Continue prednisone 5 mg daily at this time  - Continue tacrolimus  - Avoid NSAIDs/nephrotoxins     Severe  obesity:   -Increased risk of all cause mortality  -Outpatient follow-up.     History of provoked bilateral PE:   -not on anticoags     Paroxysmal atrial fibrillation:  Underwent 48-hour Holter monitoring in May 2016 demonstrating sinus rhythm with occasional supraventricular tachycardia.  No atrial fibrillation reported on that study.  Currently in sinus rhythm.  At time of event in 2016, recognition was for discussion with primary care provider regarding risk versus benefit of anticoagulation. [id number] score of 3 for age, female sex, diabetes.  -Defer further discussion of anticoagulation to primary care provider.       History of seizure disorder:   - Keppra level elevated. EEG with no epileptic activity.  Plan:   - Hold PTA keppra for now     Hypertension:  -Continue prior to admission amlodipine 10 milligrams daily     Cystic finding in the pancreatic body.  Incidental on extensive imaging in the emergency department.  Has been noted previously as well.  Question of intraductal papillary mucinous neoplasm.  Patient follows with Dr. Chen of gastroenterology, largely for her gastroparesis.  - follow-up with gastroenterology.      Hypothyroidism:  -Continue prior to admission levothyroxine 50 mcg daily        Diet: Moderate Consistent CHO Diet    DVT Prophylaxis: Pneumatic Compression Devices  Johnson Catheter: in place, indication:    Code Status: Full Code      Disposition Plan   Expected discharge: 2 - 3 days, recommended to prior living arrangement once antibiotic plan established.  Entered: Guero Jacques MD 07/16/2019, 4:44 PM       The patient's care was discussed with the Bedside Nurse, Patient and Patient's Family.    Guero Jacques MD  Hospitalist Service  [facility]    ______________________________________________________________________    Interval History     Still confused, reported that her milk this AM was whipped cream  But more alert, seems more oriented   has concerns about  whether she should be on keppra  No CP/SOB. No fevers since admission.     Data reviewed today: I reviewed all medications, new labs and imaging results over the last 24 hours. I personally reviewed no images or EKG's today.    Physical Exam   Vital Signs: Temp: 98.4  F (36.9  C) Temp src: Oral BP: 164/80 Pulse: 65 Heart Rate: 70 Resp: 18 SpO2: 93 % O2 Device: None (Room air)    Weight: 227 lbs 11.76 oz    GENERAL APPEARANCE:  alert and no distress  RESP: CTABL  CV: regular rates and rhythm, normal S1 S2, no S3 or S4 and no murmur, click or rub  ABDOMEN: obese otherwise soft, nontender, non-distended  NEURO: alert and oriented to person, place and time. Moving all extremities. Neuropathy present in lower extremities, markedly diminished sensation.   MS: Right great toe with significant redness/swelling  SKIN: no suspicious lesions or rashes    Data   Recent Labs   Lab 07/16/19  0848 07/15/19  1546   WBC 8.3 8.4   HGB 10.3* 11.4*   MCV 92 93    200    141   POTASSIUM 4.1 4.8   CHLORIDE 115* 109   CO2 19* 23   BUN 46* 53*   CR 1.81* 1.98*   ANIONGAP 8 9   ROGER 8.4* 9.3   GLC 71 192*   ALBUMIN  --  3.1*   PROTTOTAL  --  8.5   BILITOTAL  --  0.3   ALKPHOS  --  113   ALT  --  18   AST  --  16     No results found for this or any previous visit (from the past 24 hour(s)).  Medications     sodium chloride 100 mL/hr at 07/16/19 1030       aspirin  81 mg Oral Daily     ertapenem (INVanz) IV  1 g Intravenous Q24H     [START ON 7/17/2019] famotidine  20 mg Oral Daily     insulin aspart  1-3 Units Subcutaneous TID AC     insulin aspart  1-3 Units Subcutaneous At Bedtime     insulin glargine  10 Units Subcutaneous At Bedtime     labetalol  150 mg Oral BID     levothyroxine  50 mcg Oral Daily     pravastatin  10 mg Oral Daily     predniSONE  5 mg Oral Daily     sodium chloride (PF)  3 mL Intracatheter Q8H     tacrolimus  2 mg Oral BID

## 2019-07-17 ENCOUNTER — APPOINTMENT (OUTPATIENT)
Dept: GENERAL RADIOLOGY | Facility: CLINIC | Age: 74
DRG: 617 | End: 2019-07-17
Attending: PODIATRIST
Payer: MEDICARE

## 2019-07-17 ENCOUNTER — APPOINTMENT (OUTPATIENT)
Dept: MRI IMAGING | Facility: CLINIC | Age: 74
DRG: 617 | End: 2019-07-17
Attending: PODIATRIST
Payer: MEDICARE

## 2019-07-17 LAB
ANION GAP SERPL CALCULATED.3IONS-SCNC: 6 MMOL/L (ref 3–14)
BACTERIA SPEC CULT: ABNORMAL
BUN SERPL-MCNC: 38 MG/DL (ref 7–30)
CALCIUM SERPL-MCNC: 8.7 MG/DL (ref 8.5–10.1)
CHLORIDE SERPL-SCNC: 116 MMOL/L (ref 94–109)
CO2 SERPL-SCNC: 22 MMOL/L (ref 20–32)
CREAT SERPL-MCNC: 1.76 MG/DL (ref 0.52–1.04)
GFR SERPL CREATININE-BSD FRML MDRD: 28 ML/MIN/{1.73_M2}
GLUCOSE BLDC GLUCOMTR-MCNC: 144 MG/DL (ref 70–99)
GLUCOSE BLDC GLUCOMTR-MCNC: 161 MG/DL (ref 70–99)
GLUCOSE BLDC GLUCOMTR-MCNC: 162 MG/DL (ref 70–99)
GLUCOSE BLDC GLUCOMTR-MCNC: 175 MG/DL (ref 70–99)
GLUCOSE BLDC GLUCOMTR-MCNC: 212 MG/DL (ref 70–99)
GLUCOSE SERPL-MCNC: 169 MG/DL (ref 70–99)
Lab: ABNORMAL
POTASSIUM SERPL-SCNC: 4.1 MMOL/L (ref 3.4–5.3)
SODIUM SERPL-SCNC: 144 MMOL/L (ref 133–144)
SPECIMEN SOURCE: ABNORMAL

## 2019-07-17 PROCEDURE — 25000132 ZZH RX MED GY IP 250 OP 250 PS 637: Mod: GY | Performed by: STUDENT IN AN ORGANIZED HEALTH CARE EDUCATION/TRAINING PROGRAM

## 2019-07-17 PROCEDURE — 25000131 ZZH RX MED GY IP 250 OP 636 PS 637: Mod: GY | Performed by: STUDENT IN AN ORGANIZED HEALTH CARE EDUCATION/TRAINING PROGRAM

## 2019-07-17 PROCEDURE — 00000146 ZZHCL STATISTIC GLUCOSE BY METER IP

## 2019-07-17 PROCEDURE — 25800030 ZZH RX IP 258 OP 636: Performed by: STUDENT IN AN ORGANIZED HEALTH CARE EDUCATION/TRAINING PROGRAM

## 2019-07-17 PROCEDURE — 36415 COLL VENOUS BLD VENIPUNCTURE: CPT | Performed by: STUDENT IN AN ORGANIZED HEALTH CARE EDUCATION/TRAINING PROGRAM

## 2019-07-17 PROCEDURE — 73718 MRI LOWER EXTREMITY W/O DYE: CPT | Mod: RT

## 2019-07-17 PROCEDURE — 73660 X-RAY EXAM OF TOE(S): CPT | Mod: RT

## 2019-07-17 PROCEDURE — 11042 DBRDMT SUBQ TIS 1ST 20SQCM/<: CPT | Performed by: PODIATRIST

## 2019-07-17 PROCEDURE — 99222 1ST HOSP IP/OBS MODERATE 55: CPT | Mod: 25 | Performed by: PODIATRIST

## 2019-07-17 PROCEDURE — 80048 BASIC METABOLIC PNL TOTAL CA: CPT | Performed by: STUDENT IN AN ORGANIZED HEALTH CARE EDUCATION/TRAINING PROGRAM

## 2019-07-17 PROCEDURE — 25000132 ZZH RX MED GY IP 250 OP 250 PS 637: Mod: GY | Performed by: INTERNAL MEDICINE

## 2019-07-17 PROCEDURE — 12000000 ZZH R&B MED SURG/OB

## 2019-07-17 PROCEDURE — 99232 SBSQ HOSP IP/OBS MODERATE 35: CPT | Performed by: STUDENT IN AN ORGANIZED HEALTH CARE EDUCATION/TRAINING PROGRAM

## 2019-07-17 PROCEDURE — 80197 ASSAY OF TACROLIMUS: CPT | Performed by: STUDENT IN AN ORGANIZED HEALTH CARE EDUCATION/TRAINING PROGRAM

## 2019-07-17 RX ORDER — CEPHALEXIN 500 MG/1
500 CAPSULE ORAL EVERY 12 HOURS
Status: DISCONTINUED | OUTPATIENT
Start: 2019-07-17 | End: 2019-07-21 | Stop reason: HOSPADM

## 2019-07-17 RX ORDER — LEVETIRACETAM 250 MG/1
250 TABLET ORAL 2 TIMES DAILY
Status: DISCONTINUED | OUTPATIENT
Start: 2019-07-18 | End: 2019-07-21 | Stop reason: HOSPADM

## 2019-07-17 RX ADMIN — INSULIN ASPART 1 UNITS: 100 INJECTION, SOLUTION INTRAVENOUS; SUBCUTANEOUS at 17:32

## 2019-07-17 RX ADMIN — Medication 150 MG: at 08:37

## 2019-07-17 RX ADMIN — Medication 150 MG: at 21:42

## 2019-07-17 RX ADMIN — TACROLIMUS 2 MG: 1 CAPSULE ORAL at 21:42

## 2019-07-17 RX ADMIN — FAMOTIDINE 20 MG: 20 TABLET, FILM COATED ORAL at 08:37

## 2019-07-17 RX ADMIN — ASPIRIN 81 MG: 81 TABLET, COATED ORAL at 08:37

## 2019-07-17 RX ADMIN — TACROLIMUS 2 MG: 1 CAPSULE ORAL at 08:38

## 2019-07-17 RX ADMIN — INSULIN ASPART 1 UNITS: 100 INJECTION, SOLUTION INTRAVENOUS; SUBCUTANEOUS at 11:30

## 2019-07-17 RX ADMIN — SODIUM CHLORIDE, PRESERVATIVE FREE: 5 INJECTION INTRAVENOUS at 07:47

## 2019-07-17 RX ADMIN — CEPHALEXIN 500 MG: 500 CAPSULE ORAL at 15:33

## 2019-07-17 RX ADMIN — LEVOTHYROXINE SODIUM 50 MCG: 50 TABLET ORAL at 08:38

## 2019-07-17 RX ADMIN — SODIUM CHLORIDE, PRESERVATIVE FREE: 5 INJECTION INTRAVENOUS at 17:35

## 2019-07-17 RX ADMIN — PREDNISONE 5 MG: 5 TABLET ORAL at 08:38

## 2019-07-17 RX ADMIN — PRAVASTATIN SODIUM 10 MG: 10 TABLET ORAL at 08:38

## 2019-07-17 RX ADMIN — INSULIN GLARGINE 10 UNITS: 100 INJECTION, SOLUTION SUBCUTANEOUS at 21:41

## 2019-07-17 ASSESSMENT — ACTIVITIES OF DAILY LIVING (ADL)
ADLS_ACUITY_SCORE: 30
ADLS_ACUITY_SCORE: 32
ADLS_ACUITY_SCORE: 28
ADLS_ACUITY_SCORE: 32

## 2019-07-17 NOTE — PROGRESS NOTES
[facility]    Infectious Disease Progress Note    Date of Service (when I saw the patient): 07/17/2019     Assessment & Plan   Gem Jade is a 73 year old female who was admitted on 7/15/2019.       Impression:  1. 73 y.o female familiar to me from numerous prior admission with similar presentation.   2. History of renal transplant, chronic indwelling rico. On immunosuppressives.   3. Diabetes.   4. Previous history of MDR.   5. Admitted again with altered mental status.   6. UA grossly abnormal.   7. UC with MSSA.   8. On ertapenem.  9. 1/2 blood cultures, diphtheroid,  prob contaminant.      Recommendations:   Can switch to oral keflex.   Right great toe redness and a scab, x- ray and podiatry eval pending, will follow.     Paramjit Mckenzie MD    Interval History   Afebrile   Clear mentally     Physical Exam   Temp: 98.3  F (36.8  C) Temp src: Oral BP: 168/71 Pulse: 70 Heart Rate: 67 Resp: 16 SpO2: 96 % O2 Device: None (Room air)    Vitals:    07/16/19 0652 07/17/19 0646   Weight: 103.3 kg (227 lb 11.8 oz) 105 kg (231 lb 7.7 oz)     Vital Signs with Ranges  Temp:  [98.3  F (36.8  C)-98.6  F (37  C)] 98.3  F (36.8  C)  Pulse:  [70] 70  Heart Rate:  [67-72] 67  Resp:  [16-18] 16  BP: (161-168)/(71-80) 168/71  SpO2:  [93 %-97 %] 96 %    Constitutional: Awake, alert, cooperative, no apparent distress  Lungs: Clear to auscultation bilaterally, no crackles or wheezing  Cardiovascular: Regular rate and rhythm, normal S1 and S2, and no murmur noted  Abdomen: Normal bowel sounds, soft, non-distended, non-tender  Skin: No rashes, no cyanosis, no edema  Other:    Medications     sodium chloride 100 mL/hr at 07/17/19 0747       aspirin  81 mg Oral Daily     ertapenem (INVanz) IV  1 g Intravenous Q24H     famotidine  20 mg Oral Daily     insulin aspart  1-3 Units Subcutaneous TID AC     insulin aspart  1-3 Units Subcutaneous At Bedtime     insulin glargine  10 Units Subcutaneous At Bedtime     labetalol  150  mg Oral BID     [START ON 7/18/2019] levETIRAcetam  250 mg Oral BID     levothyroxine  50 mcg Oral Daily     pravastatin  10 mg Oral Daily     predniSONE  5 mg Oral Daily     sodium chloride (PF)  3 mL Intracatheter Q8H     tacrolimus  2 mg Oral BID       Data   All microbiology laboratory data reviewed.  Recent Labs   Lab Test 07/16/19  0848 07/15/19  1546 07/01/19  1208   WBC 8.3 8.4 7.6   HGB 10.3* 11.4* 9.5*   HCT 33.1* 36.3 29.9*   MCV 92 93 93    200 173     Recent Labs   Lab Test 07/17/19  0755 07/16/19  0848 07/15/19  1546   CR 1.76* 1.81* 1.98*     Recent Labs   Lab Test 05/25/16  2116   SED 53*     Recent Labs   Lab Test 07/15/19  1536 07/15/19  1535 06/04/19  0738 06/02/19  2122 06/02/19  2111 06/02/19  2028 05/10/19  1205 04/24/19  1405 04/24/19  1258   CULT Cultured on the 2nd day of incubation:  Gram positive bacilli resembling diphtheroids  *  Critical Value/Significant Value, preliminary result only, called to and read back by  Paddy Breen Rn @ 0752 7.17.19 CS    (Note)  NEGATIVE for the following: Staphylococcus spp., Staph aureus, Staph  epidermidis, Staph lugdunensis, Streptococcus spp., Strep pneumoniae,  Strep pyogenes, Strep agalactiae, Strep anginosus group, Enterococcus  faecalis, Enterococcus faecium, and Listeria spp. by [employer]  multiplex nucleic acid test. Final identification and antimicrobial  susceptibility testing will be verified by standard methods.    Critical Value/Significant Value called to and read back by Paddy Breen RN @ 1042 7.17.19 CS     50,000 to 100,000 colonies/mL  Staphylococcus aureus  Susceptibility testing in progress  *  No growth after 2 days No growth >100,000 colonies/mL  Citrobacter freundii complex  *  >100,000 colonies/mL  Staphylococcus aureus  * Cultured on the 1st day of incubation:  Staphylococcus capitis  This isolate DOES NOT demonstrate inducible clindamycin resistance in vitro. Clindamycin   is susceptible and could be used when  indicated, however, erythromycin is resistant and   should not be used.  *  Critical Value/Significant Value, preliminary result only, called to and read back by  Jyoti Brower RN on 06.03.2019 at 2151. JRT    (Note)  POSITIVE for Staphylococci other than S.aureus, S.epidermidis and  S.lugdunensis, by [email or web address] multiplex nucleic acid test.  Coagulase-negative staphylococci are the most common venipuncture or  collection associated skin CONTAMINANTS grown in blood cultures.  Final identification and antimicrobial susceptibility testing will be  verified by standard methods.    Specimen tested with [employer]ene multiplex, gram-positive blood culture  nucleic acid test for the following targets: Staph aureus, Staph  epidermidis, Staph lugdunensis, other Staph species, Enterococcus  faecalis, Enterococcus faecium, Streptococcus species, S. agalactiae,  S. anginosus grp., S. pneumoniae, S. pyogenes, Listeria sp., mecA  (methicillin resistance) and Viridiana/B (vancomycin resistance).    Critical Value/Significant Value called to and read back by  DENNIS RAMIREZ RN ([id number]).  06.04.19  0028 GJS   No growth >100,000 colonies/mL  Citrobacter freundii complex  * >100,000 colonies/mL  Citrobacter freundii complex  * No growth  No growth       Attestation:  Total time on the floor involved in the patient's care: 35 minutes. Total time spent in counseling/care coordination: >50%

## 2019-07-17 NOTE — CONSULTS
"[facility]  CONSULTATION NOTE    ASSESSMENT:     73 year old female who was admitted due to altered mental status and found to have acute encephalopathy, believed to be from a UTI.  She has type 2 DM,  peripheral neuropathy and an ulcer on the right hallux with cellulitis and likely septic joint and osteomyelitis.      PLAN:   The right hallux wound was debrided (excisional) - see below.     MRI right foot    I explained to Gem that the appearance of the wound, the ability to probe to bone and into the joint, is highly concerning for osteomyelitis and that she might need a toe amputation      Antibiotics per infectious disease    Wound care per [facility] recs for now.     Dr. Irby will follow up with the MRI results tomorrow. If medically stable, I anticipate probable right hallux amputation on Friday.     I left a message for her , Chang, briefly outlining the above plan.     Thank you for the consultation request and the opportunity to participate in this patient's care.       Armen Jefferson DPM, FACLISA, MS    [place] Department of Podiatry/Foot & Ankle Surgery    Pager:  [phone]      Excisional Debridement    The excisional debrident procedure discussed.  This included the goals of removing non-viable tissue, evaluating the full extent of wound, and promoting wound healing.  Gemedmundo Jade  provided verbal and written consent.  The \"Time Out\" was called.     Using a sterile #15 blade and tissue nippers, excisional debridment of the right hallux ulcer was performed, full-thickness to the level of, and including, the fat layer.  The hyperkeratotic eschar surrounding the wound was removed, by excising skin edges back to healthy, bleeding tissue .  Other non-viable tissue was excised. The ulcer base was scraped to remove bioburden and promote healing.  The area debrided was less than 20 square cm.   There was moderate bleeding with the procedure. No anesthesia was needed " due to peripheral neuropathy.   A sterile dressing was applied.    _______________________________________________________________________    CHIEF COMPLAINT:      I was asked by Dr. Jacques to evaluate this patient for a wound on her right great toe and concern for infection..    PATIENT HISTORY:  Gem Jade is a 73 year old female who was admitted due to altered mental status and found to have acute encephalopathy, believed to be from a UTI.  She has type 2 DM and peripheral neuropathy. Last Hgb A1C = 6.2%. She reports numbness in her feet.     She is unaware of why it developed. She denies pain in the toe. When she was evaluated by Emmy Sifuentes C RNm erythema and purulent drainage was noted. Podiatry was then consulted to evaluate for deeper infection.      Review of Systems:  A 10 point review of systems was performed and is positive for that noted above in the patient history.  All other areas are negative.     PAST MEDICAL HISTORY:   Past Medical History:   Diagnosis Date     Background diabetic retinopathy(362.01)     extensive diabetic retinopathy, s/p multiple laser treatments     Diabetic gastroparesis (H)     followed by [facility] (Dr. 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)      Urinary tract infection, site not specified     Chronic UTIs     UTI (urinary tract infection) due to urinary indwelling catheter (H)         PAST SURGICAL HISTORY:   Past Surgical History:   Procedure Laterality Date     C NONSPECIFIC PROCEDURE   10/99    kidney transplant     C NONSPECIFIC PROCEDURE      MRI head, lumbar spine, pelvis     C TOTAL KNEE ARTHROPLASTY  3/03    Knee Replacement, Total right, post op PE     CHOLECYSTECTOMY       HC LEFT HEART CATHETERIZATION      Cardiac Cath, Left Heart, Negative  (@ [facility])     HC LEFT HEART CATHETERIZATION      normal coronary angiogram at [facility], had mild troponin rise (0.71)     HC REMV CATARACT EXTRACAP,INSERT LENS      bilateral cataract repaired, left eye         MEDICATIONS:  Reviewed in Epic. Current oral Keflex per ID.     ALLERGIES:    Allergies   Allergen Reactions     Atorvastatin Calcium      myalgias, muscle aches     Codeine Nausea and Vomiting     Darvocet [Propoxyphene N-Apap] Nausea and Vomiting     Hydromorphone      Levofloxacin Other (See Comments) and Diarrhea     hallucinations     Morphine      Nausea and vomiting     Niaspan [Niacin] GI Disturbance     Flushing even at low dose, GI upset     Percocet [Oxycodone-Acetaminophen]      Vomiting after taking med couple of times     Vicodin [Hydrocodone-Acetaminophen] Nausea and Vomiting        SOCIAL HISTORY:   Social History     Socioeconomic History     Marital status:      Spouse name: Not on file     Number of children: Not on file     Years of education: Not on file     Highest education level: Not on file   Occupational History     Not on file   Social Needs     Financial resource strain: Not on file     Food insecurity:     Worry: Not on file     Inability: Not on file     Transportation needs:     Medical: Not on file     Non-medical: Not on file   Tobacco Use     Smoking status: Former Smoker     Years: 17.00     Last attempt to quit: 1987     Years since quittin.1     Smokeless tobacco: Never Used   Substance and Sexual Activity     Alcohol use: No     Drug use: No     Sexual activity: Yes     Partners: Male   Lifestyle     Physical activity:     Days per week: Not on file     Minutes per session: Not on  file     Stress: Not on file   Relationships     Social connections:     Talks on phone: Not on file     Gets together: Not on file     Attends Restorationist service: Not on file     Active member of club or organization: Not on file     Attends meetings of clubs or organizations: Not on file     Relationship status: Not on file     Intimate partner violence:     Fear of current or ex partner: Not on file     Emotionally abused: Not on file     Physically abused: Not on file     Forced sexual activity: Not on file   Other Topics Concern     Not on file   Social History Narrative    , lives with , she is bed bound (non-ambulatory) for last several years.  She has had a rico for > 10 years.  She is a full code as discussed with .  (last updated 7/5/2018)         FAMILY HISTORY:   Family History   Problem Relation Age of Onset     Diabetes Mother      Parkinsonism Mother      Diabetes Brother      Hypertension Father         [id number] pneumonia     Heart Disease Father         EXAM:Vitals: /71 (BP Location: Right arm)   Pulse 70   Temp 98.3  F (36.8  C) (Oral)   Resp 16   Wt 105 kg (231 lb 7.7 oz)   SpO2 96%   BMI 36.26 kg/m    BMI= Body mass index is 36.26 kg/m .    LABS:     Lab Results   Component Value Date    WBC 8.3 07/16/2019     Lab Results   Component Value Date    RBC 3.60 07/16/2019     Lab Results   Component Value Date    HGB 10.3 07/16/2019     Lab Results   Component Value Date    HCT 33.1 07/16/2019     No components found for: MCT  Lab Results   Component Value Date    MCV 92 07/16/2019     Lab Results   Component Value Date    MCH 28.6 07/16/2019     Lab Results   Component Value Date    MCHC 31.1 07/16/2019     Lab Results   Component Value Date    RDW 14.3 07/16/2019     Lab Results   Component Value Date     07/16/2019       Recent Labs   Lab Test 07/17/19  0755 07/16/19  0848    142   POTASSIUM 4.1 4.1   CHLORIDE 116* 115*   CO2 22 19*   ANIONGAP 6 8    * 71   BUN 38* 46*   CR 1.76* 1.81*   ROGER 8.7 8.4*       General appearance: Patient is alert and fully cooperative with history & exam.  No sign of distress is noted during the visit.      Psychiatric: Affect is pleasant & appropriate.  Patient appears motivated to improve health.       Respiratory: Breathing is regular & unlabored while sitting.      HEENT: Hearing is intact to spoken word.  Speech is clear.  No gross evidence of visual impairment that would impact ambulation.       Vascular: DP & PT pulses are intact & regular bilaterally.  Lower extremity edema with more localized edema of the right hallux.      Neurologic: Lower extremity sensation is profoundly diminished to light touch.       Musculoskeletal: Patient is non ambulatory. She maintains some bilateral LE strength, but has equinus contractures of the ankles.  Multiple digits are contracted. This includes the right hallux. This is not fully reducible.     Dermatologic:  There is a 1cm diameter wound on the dorsal aspect of the right hallux. This is covered by what appears to be more of a blackened eschar, rather than scab. Some purulent drainage was noted. Post excisional debridement and removal of the eschar, there is a deeper wound, with necrotic base that probes to bone and into the interphalangeal joint.    IMAGING:   THREE VIEWS RIGHT GREAT TOE July 17, 2019 2:16 PM     HISTORY: Swelling and tenderness of the great toe.     COMPARISON: None.     FINDINGS: No acute fracture or osseous lesion is seen. There is  prominent hallux valgus and moderate if not marked degenerative change  of the first metatarsophalangeal joint. There are moderate  degenerative changes of the IP joint of the great toe. There are  additional degenerative changes and hammertoe deformities of the  remaining visualized toes. There is calcification in the vascular  structures. No other abnormality is seen.                                                                       IMPRESSION: Degenerative changes with no acute abnormality seen.      JESS ESCOBEDO MD

## 2019-07-17 NOTE — PROGRESS NOTES
[facility]  [facility]  Neurology Daily Note      Admission Date:7/15/2019   Date of service: 07/17/2019   Hospital Day: 3                                                   Assessment and Plan:   #.  Episode of loss of consciousness.  Now recovered.  Most likely toxic encephalopathy related to UTI/behavioral sx.  Cannot completely r/o sz.  --As she is now recovered, no further testing is indicated.  Should continue to improve with treatment of UTI  Levetiracetam level was noted to be high.  May contribute to behavioral difficulty/hallucination.  Given hx of seizure disorder would recommend continuing and restart of dose tomorrow at 250mg bid.      Patient reports she was previously followed by neurologist at the  although I do not see record of who that was.  She should return to the care of that neurologist for follow-up monitoring of levetiracetam.     Please call if neurology follow up needed.   Lm w  about plan      Interval History:   Chart reviewed.  Issues with agitation/behavior. No further episode of AOC/LOC. Hallucination/paranoia per nursing notes.  Pt wishes she had her phone.       Review of Systems:   The Review of Systems is negative other than noted in the HPI       Medications:   Scheduled Meds:    aspirin  81 mg Oral Daily     ertapenem (INVanz) IV  1 g Intravenous Q24H     famotidine  20 mg Oral Daily     insulin aspart  1-3 Units Subcutaneous TID AC     insulin aspart  1-3 Units Subcutaneous At Bedtime     insulin glargine  10 Units Subcutaneous At Bedtime     labetalol  150 mg Oral BID     levothyroxine  50 mcg Oral Daily     pravastatin  10 mg Oral Daily     predniSONE  5 mg Oral Daily     sodium chloride (PF)  3 mL Intracatheter Q8H     tacrolimus  2 mg Oral BID     PRN Meds: acetaminophen, baclofen, glucose **OR** dextrose **OR** glucagon, lidocaine 4%, lidocaine (buffered or not buffered), melatonin, naloxone, ondansetron **OR** ondansetron,  sodium chloride (PF)        Physical Exam:   Vitals: Temp: 98.3  F (36.8  C) Temp src: Oral BP: 168/71 Pulse: 70 Heart Rate: 67 Resp: 16 SpO2: 96 % O2 Device: None (Room air)    Vital Signs with Ranges: Temp:  [98.3  F (36.8  C)-98.6  F (37  C)] 98.3  F (36.8  C)  Pulse:  [70] 70  Heart Rate:  [67-72] 67  Resp:  [16-18] 16  BP: (161-168)/(71-80) 168/71  SpO2:  [93 %-97 %] 96 %    General Appearance:  No acute distress  Neuro:       Mental Status Exam:   Alert and oriented.  Remembers who I am.  Language and speech intact.    levetiracetam 84

## 2019-07-17 NOTE — PROGRESS NOTES
A/O x 4 with intermitted confusion, vss, a-febrile, denies pain, up with two assist and ceiling lift, patient is total care and non ambulatory, turn and reposition Q 2 hrs, skin intact except redness on coccyx, bilateral groin, barrier cream applied, appetite poor, blood sugars are stable. Blood cultures drawn yesterday came back this morning at 8am, GM + Bacilli resembling Diphtheroids, results reported to MD, no orders were given, pt on IV antibiotics.

## 2019-07-17 NOTE — PLAN OF CARE
DATE & TIME: 7/17/19                         Cognitive Concerns/ Orientation : Alert and Oriented, have some paranoia about her window being broken in her room.   Patient seeing bugs in her room.    BEHAVIOR & AGGRESSION TOOL COLOR: Green  CIWA SCORE: NA       ABNL VS/O2: VSS on RA  MOBILITY: Assist of 2 for transfers with lift, assist of two to reposition   PAIN MANAGMENT: Denies any pain   DIET: Mod Carb  BOWEL/BLADDER: Chronic Johnson, can be incontinent of bowel  ABNL LAB/BG:  at 0200, snack given at bedtime due to low blood sugar this yesterday morning.  BUN 46.    DRAIN/DEVICES: PIV in left arm, infusing .   TELEMETRY RHYTHM: NA  SKIN: Redness to coccyx and thighs, wound on right great toe.    TESTS/PROCEDURES: EEG completed   D/C DAY/GOALS/PLACE: Pending cognition back to baseline   OTHER IMPORTANT INFO: Contact iso for MRSA, VRE and ESBL.

## 2019-07-17 NOTE — PROGRESS NOTES
[facility]    Medicine Progress Note - Hospitalist Service       Date of Admission:  7/15/2019  Date of Service: 2019    Assessment & Plan        Gem Jade is a 73 year old female who presents with decreased level of alertness.     Acute encephalopathy, secondary to catheter related urinary tract infection  Chronic indwelling Johnson  Assessment: History of frequent urinary tract infections associated with chronic indwelling Johnson catheter.  Most recent urine cultures 2019 and 5/10/2019 demonstrated greater than 100,000 colony-forming units of Citrobacter.  Has a history of VRE, MRSA, and ESBL. Note that patient has followed with infectious disease clinic in the past related to recurrent urinary tract infections with colonization. Wonder if AMS is second to recurrent UTI vs seizure??  Plan:  - Continue indwelling Johnson catheter for retention  - Follow UC/BC  - IV Invanz -> Keflex today  - IVF  - ID following  - Follow vitals/temp     Right Toe Redness/Swelling  Assessment: have concern about deep infection in addition to likely cellulitis  Plan:  - Podiatry consulted, debriding toe today    Obstructive sleep apnea on CPAP:   -CPAP as per home settings      Type 2 diabetes with retinopathy, nephropathy, neuropathy including diabetic gastroparesis   Assessment: most recent A1c 6.2 on 2019. PTA on Glargine 30 U at bedtime.  Plan:  - Continue Glargine 10 U given minimal PO intake.  - sliding scale insulin as needed  - Hypoglycemia protocol    End-stage renal disease status post historic right lower quadrant renal transplant:   Assessment: Renal transplant in .  Uncertain if this was living or  donor. Cr on admission 1.98, elevated from baseline 1.54-1.82. Cr improving with IVF.  Plan:  - Tacrolimus level pending  - IVF  - Continue prednisone 5 mg daily at this time  - Continue tacrolimus  - Avoid NSAIDs/nephrotoxins     Severe obesity:   -Increased risk of all cause  mortality  -Outpatient follow-up.     History of provoked bilateral PE:   -not on anticoags     Paroxysmal atrial fibrillation:  Underwent 48-hour Holter monitoring in May 2016 demonstrating sinus rhythm with occasional supraventricular tachycardia.  No atrial fibrillation reported on that study.  Currently in sinus rhythm.  At time of event in 2016, recognition was for discussion with primary care provider regarding risk versus benefit of anticoagulation. [id number] score of 3 for age, female sex, diabetes.  -Defer further discussion of anticoagulation to primary care provider.       History of seizure disorder:   - Keppra level elevated. EEG with no epileptic activity. Neuro was consulted, recommended that given hx of seizure disorder, they recommend continuing and restart of dose reduced at 250 mg bid.    Plan:   - Hold PTA keppra for now given acute hallucination     Hypertension:  -Continue prior to admission amlodipine 10 mg daily     Cystic finding in the pancreatic body.  Incidental on extensive imaging in the emergency department.  Has been noted previously as well.  Question of intraductal papillary mucinous neoplasm.  Patient follows with Dr. Chen of gastroenterology, largely for her gastroparesis.  - follow-up with gastroenterology.      Hypothyroidism:  -Continue prior to admission levothyroxine 50 mcg daily        Diet: Moderate Consistent CHO Diet    DVT Prophylaxis: Pneumatic Compression Devices  Johnson Catheter: in place, indication: Retention  Code Status: Full Code      Disposition Plan   Expected discharge: 2 - 3 days, recommended to prior living arrangement once antibiotic plan established.  Entered: Guero Jacques MD 07/17/2019, 3:39 PM       The patient's care was discussed with the Bedside Nurse, Patient and Patient's Family.    Guero Jacques MD  Hospitalist Service  [facility]    ______________________________________________________________________    Interval History     No  acute events overnight  Intermittently hallucinating throughout day today  No CP/SOB. No fevers since admission.   No nausea/vomiting  Bloody drainage coming from tow    Data reviewed today: I reviewed all medications, new labs and imaging results over the last 24 hours. I personally reviewed no images or EKG's today.    Physical Exam   Vital Signs: Temp: 98.3  F (36.8  C) Temp src: Oral BP: 168/71 Pulse: 70 Heart Rate: 67 Resp: 16 SpO2: 96 % O2 Device: None (Room air)    Weight: 231 lbs 7.73 oz    GENERAL APPEARANCE:  alert and no distress  RESP: CTABL  CV: RRR with no m/r/g  ABDOMEN: obese otherwise soft, nontender, non-distended  : rico catheter present  NEURO: alert and oriented to person, place and time. Moving all extremities. Neuropathy present in lower extremities, markedly diminished sensation.   MS: Right great toe with ulceration with bleeding and significant redness/swelling  SKIN: no suspicious lesions or rashes    Data   Recent Labs   Lab 07/17/19  0755 07/16/19  0848 07/15/19  1546   WBC  --  8.3 8.4   HGB  --  10.3* 11.4*   MCV  --  92 93   PLT  --  150 200    142 141   POTASSIUM 4.1 4.1 4.8   CHLORIDE 116* 115* 109   CO2 22 19* 23   BUN 38* 46* 53*   CR 1.76* 1.81* 1.98*   ANIONGAP 6 8 9   ROGER 8.7 8.4* 9.3   * 71 192*   ALBUMIN  --   --  3.1*   PROTTOTAL  --   --  8.5   BILITOTAL  --   --  0.3   ALKPHOS  --   --  113   ALT  --   --  18   AST  --   --  16     Recent Results (from the past 24 hour(s))   XR Toe Right G/E 2 Views    Narrative    THREE VIEWS RIGHT GREAT TOE July 17, 2019 2:16 PM    HISTORY: Swelling and tenderness of the great toe.    COMPARISON: None.    FINDINGS: No acute fracture or osseous lesion is seen. There is  prominent hallux valgus and moderate if not marked degenerative change  of the first metatarsophalangeal joint. There are moderate  degenerative changes of the IP joint of the great toe. There are  additional degenerative changes and hammertoe deformities  of the  remaining visualized toes. There is calcification in the vascular  structures. No other abnormality is seen.      Impression    IMPRESSION: Degenerative changes with no acute abnormality seen.     JESS ESCOBEDO MD     Medications     sodium chloride 100 mL/hr at 07/17/19 0747       aspirin  81 mg Oral Daily     cephALEXin  500 mg Oral Q12H     famotidine  20 mg Oral Daily     insulin aspart  1-3 Units Subcutaneous TID AC     insulin aspart  1-3 Units Subcutaneous At Bedtime     insulin glargine  10 Units Subcutaneous At Bedtime     labetalol  150 mg Oral BID     [START ON 7/18/2019] levETIRAcetam  250 mg Oral BID     levothyroxine  50 mcg Oral Daily     pravastatin  10 mg Oral Daily     predniSONE  5 mg Oral Daily     sodium chloride (PF)  3 mL Intracatheter Q8H     tacrolimus  2 mg Oral BID

## 2019-07-18 LAB
ANION GAP SERPL CALCULATED.3IONS-SCNC: 5 MMOL/L (ref 3–14)
BUN SERPL-MCNC: 29 MG/DL (ref 7–30)
CALCIUM SERPL-MCNC: 8.9 MG/DL (ref 8.5–10.1)
CHLORIDE SERPL-SCNC: 117 MMOL/L (ref 94–109)
CO2 SERPL-SCNC: 20 MMOL/L (ref 20–32)
CREAT SERPL-MCNC: 1.55 MG/DL (ref 0.52–1.04)
GFR SERPL CREATININE-BSD FRML MDRD: 33 ML/MIN/{1.73_M2}
GLUCOSE BLDC GLUCOMTR-MCNC: 139 MG/DL (ref 70–99)
GLUCOSE BLDC GLUCOMTR-MCNC: 156 MG/DL (ref 70–99)
GLUCOSE BLDC GLUCOMTR-MCNC: 170 MG/DL (ref 70–99)
GLUCOSE BLDC GLUCOMTR-MCNC: 180 MG/DL (ref 70–99)
GLUCOSE BLDC GLUCOMTR-MCNC: 195 MG/DL (ref 70–99)
GLUCOSE SERPL-MCNC: 157 MG/DL (ref 70–99)
POTASSIUM SERPL-SCNC: 4.2 MMOL/L (ref 3.4–5.3)
SODIUM SERPL-SCNC: 142 MMOL/L (ref 133–144)
TACROLIMUS BLD-MCNC: 8.1 UG/L (ref 5–15)
TME LAST DOSE: NORMAL H

## 2019-07-18 PROCEDURE — 00000146 ZZHCL STATISTIC GLUCOSE BY METER IP

## 2019-07-18 PROCEDURE — 25800030 ZZH RX IP 258 OP 636: Performed by: STUDENT IN AN ORGANIZED HEALTH CARE EDUCATION/TRAINING PROGRAM

## 2019-07-18 PROCEDURE — 99232 SBSQ HOSP IP/OBS MODERATE 35: CPT | Performed by: STUDENT IN AN ORGANIZED HEALTH CARE EDUCATION/TRAINING PROGRAM

## 2019-07-18 PROCEDURE — 12000000 ZZH R&B MED SURG/OB

## 2019-07-18 PROCEDURE — 25000131 ZZH RX MED GY IP 250 OP 636 PS 637: Mod: GY | Performed by: STUDENT IN AN ORGANIZED HEALTH CARE EDUCATION/TRAINING PROGRAM

## 2019-07-18 PROCEDURE — 99233 SBSQ HOSP IP/OBS HIGH 50: CPT | Mod: 57 | Performed by: PODIATRIST

## 2019-07-18 PROCEDURE — 25000132 ZZH RX MED GY IP 250 OP 250 PS 637: Mod: GY | Performed by: PSYCHIATRY & NEUROLOGY

## 2019-07-18 PROCEDURE — 36415 COLL VENOUS BLD VENIPUNCTURE: CPT | Performed by: STUDENT IN AN ORGANIZED HEALTH CARE EDUCATION/TRAINING PROGRAM

## 2019-07-18 PROCEDURE — 25000132 ZZH RX MED GY IP 250 OP 250 PS 637: Mod: GY | Performed by: STUDENT IN AN ORGANIZED HEALTH CARE EDUCATION/TRAINING PROGRAM

## 2019-07-18 PROCEDURE — 80048 BASIC METABOLIC PNL TOTAL CA: CPT | Performed by: STUDENT IN AN ORGANIZED HEALTH CARE EDUCATION/TRAINING PROGRAM

## 2019-07-18 PROCEDURE — 25000132 ZZH RX MED GY IP 250 OP 250 PS 637: Mod: GY | Performed by: INTERNAL MEDICINE

## 2019-07-18 RX ORDER — AMLODIPINE BESYLATE 10 MG/1
10 TABLET ORAL DAILY
Status: DISCONTINUED | OUTPATIENT
Start: 2019-07-18 | End: 2019-07-21 | Stop reason: HOSPADM

## 2019-07-18 RX ADMIN — PRAVASTATIN SODIUM 10 MG: 10 TABLET ORAL at 09:40

## 2019-07-18 RX ADMIN — SODIUM CHLORIDE, PRESERVATIVE FREE: 5 INJECTION INTRAVENOUS at 10:24

## 2019-07-18 RX ADMIN — Medication 150 MG: at 20:26

## 2019-07-18 RX ADMIN — Medication 150 MG: at 09:41

## 2019-07-18 RX ADMIN — ASPIRIN 81 MG: 81 TABLET, COATED ORAL at 09:41

## 2019-07-18 RX ADMIN — INSULIN ASPART 1 UNITS: 100 INJECTION, SOLUTION INTRAVENOUS; SUBCUTANEOUS at 12:45

## 2019-07-18 RX ADMIN — CEPHALEXIN 500 MG: 500 CAPSULE ORAL at 15:08

## 2019-07-18 RX ADMIN — PREDNISONE 5 MG: 5 TABLET ORAL at 09:41

## 2019-07-18 RX ADMIN — LEVOTHYROXINE SODIUM 50 MCG: 50 TABLET ORAL at 09:41

## 2019-07-18 RX ADMIN — FAMOTIDINE 20 MG: 20 TABLET, FILM COATED ORAL at 09:40

## 2019-07-18 RX ADMIN — TACROLIMUS 2 MG: 1 CAPSULE ORAL at 20:26

## 2019-07-18 RX ADMIN — AMLODIPINE BESYLATE 10 MG: 10 TABLET ORAL at 17:43

## 2019-07-18 RX ADMIN — TACROLIMUS 2 MG: 1 CAPSULE ORAL at 09:40

## 2019-07-18 RX ADMIN — SODIUM CHLORIDE, PRESERVATIVE FREE: 5 INJECTION INTRAVENOUS at 20:25

## 2019-07-18 RX ADMIN — INSULIN ASPART 1 UNITS: 100 INJECTION, SOLUTION INTRAVENOUS; SUBCUTANEOUS at 17:43

## 2019-07-18 RX ADMIN — LEVETIRACETAM 250 MG: 250 TABLET, FILM COATED ORAL at 20:26

## 2019-07-18 RX ADMIN — LEVETIRACETAM 250 MG: 250 TABLET, FILM COATED ORAL at 09:40

## 2019-07-18 RX ADMIN — CEPHALEXIN 500 MG: 500 CAPSULE ORAL at 04:27

## 2019-07-18 RX ADMIN — BACLOFEN 10 MG: 10 TABLET ORAL at 15:35

## 2019-07-18 ASSESSMENT — ACTIVITIES OF DAILY LIVING (ADL)
ADLS_ACUITY_SCORE: 28
ADLS_ACUITY_SCORE: 30
ADLS_ACUITY_SCORE: 30
ADLS_ACUITY_SCORE: 28
ADLS_ACUITY_SCORE: 28
ADLS_ACUITY_SCORE: 30

## 2019-07-18 NOTE — PLAN OF CARE
DATE & TIME: 7/18/19 Dayshift   Cognitive Concerns/ Orientation: Alert, currently oriented X4 but fluctuates, had visual hallucinations in beginning of shift.    BEHAVIOR & AGGRESSION TOOL COLOR: Yellow,   CIWA SCORE: NA  ABNL VS/O2: VSS on RA, /74.  MOBILITY: T&R Q2  PAIN MANAGMENT: Denies  DIET: Mod carb, no appetite.  BOWEL/BLADDER: Chronic Johnson, incont of stool- no BM this shift.   ABNL LAB/BG: Creat 1.55; BG was 139 and 156.  DRAIN/DEVICES: PIV NS at 100; Chronic Johnson  TELEMETRY RHYTHM: NA  SKIN: Redness on buttocks and posterior thighs, rt toe wound.  TESTS/PROCEDURES: Rt toe amputation tomorrow afternoon.   D/C DAY/GOALS/PLACE: Pending medical improvement.  OTHER IMPORTANT INFO: VPM in place. Contact precautions maintained. Pt agitated at beginning of shift; having visual hallucinations, pulling at Johnson, and trying to get out of bed. Pt now cooperative and oriented, sad about plan. New dressing on toe wound by podiatry today.

## 2019-07-18 NOTE — PLAN OF CARE
"Up A2 w/lift. A/ox4, confused at times. Visual hallucinations at times, states \"I think there is a doggie in the closet.\"  Coccyx and thighs w/reddness, barrier cr applied. R toe covered w/dry gauze, scant serosanguinous drainage. MRI completed on R toe, awaiting results. Chronic rico for retention w/adequate output. MCHO diet. R PIV /hr. Home cpap on HS. Will continue to monitor.   "

## 2019-07-18 NOTE — PROGRESS NOTES
"[facility]  July 18, 2019    A/P:  R 1st toe osteomyelitis  DM II    Discussed condition and treatment options including pros and cons.    Recommend R 1st toe amputation given clinical and imaging evidence of osteomyelitis.  Tentatively planned with Dr. Jefferson for tomorrow afternoon.    Pt seemed confused upon our discussion, so I think we will also need consent from family for this procedure.  Discussed with pt's  Chang, who is agreeable to the plan.  I asked that he be present tomorrow starting around noon to provide consent if he is able (he states he is able to provide consent) and discuss with Dr. Jefferson in preop.    Potential risks associated with surgery include but are not limited to infection, continued open wound at the surgical site, nonhealing wound, ulceration, bleeding, repeat surgery or amputation and blood clot.      NPO midnight.    Dressing changed.    Abx per ID.    Marvin Irby DPM, FACFAS  Pager: [phone]    S:  Pt seen at bedside.  Pt seems confused, and describes random events that \"happened recently.\"  Reports feeling tired.  No f/c.    O:  /74 (BP Location: Right arm)   Pulse 80   Temp 97.7  F (36.5  C) (Oral)   Resp 16   Wt 104.9 kg (231 lb 4.2 oz)   SpO2 98%   BMI 36.22 kg/m    General appearance: Patient is alert and fully cooperative with history & exam.  No sign of distress is noted during the visit.      Vascular: DP & PT pulses are intact & regular on the R.  Lower extremity edema with more localized edema of the right hallux.      Neurologic: Lower extremity sensation is profoundly diminished to light touch.       Musculoskeletal: Patient is non ambulatory. She maintains some bilateral LE strength, but has equinus contractures of the ankles.  Multiple digits are contracted. This includes the right hallux. This is not fully reducible.      Dermatologic:  There is a 1cm diameter wound on the dorsal aspect of the right hallux. " There is a deeper wound, with necrotic base that probes to bone and into the interphalangeal joint.  Mild erythema.     IMAGING:   THREE VIEWS RIGHT GREAT TOE July 17, 2019 2:16 PM     HISTORY: Swelling and tenderness of the great toe.     COMPARISON: None.     FINDINGS: No acute fracture or osseous lesion is seen. There is  prominent hallux valgus and moderate if not marked degenerative change  of the first metatarsophalangeal joint. There are moderate  degenerative changes of the IP joint of the great toe. There are  additional degenerative changes and hammertoe deformities of the  remaining visualized toes. There is calcification in the vascular  structures. No other abnormality is seen.        IMPRESSION: Degenerative changes with no acute abnormality seen.      JESS ESCOBEDO MD      MRI:  IMPRESSION:  1. Great toe proximal phalangeal head small cyst versus erosion and  adjacent marrow edema. This is highly suspicious for osteomyelitis,  particularly if there is an adjacent wound or draining sinus.  2. Great toe distal phalangeal base mild marrow edema which could be  reactive or represent an additional site of osteomyelitis.  3. Soft tissue edema in the forefoot, greatest dorsally. MR cannot  distinguish reactive edema from cellulitis. No discrete soft tissue  fluid collection or abscess is visible.     SYLVIA LUGO MD        Lab Results   Component Value Date    WBC 8.3 07/16/2019     Lab Results   Component Value Date    RBC 3.60 07/16/2019     Lab Results   Component Value Date    HGB 10.3 07/16/2019     Lab Results   Component Value Date    HCT 33.1 07/16/2019     No components found for: MCT  Lab Results   Component Value Date    MCV 92 07/16/2019     Lab Results   Component Value Date    MCH 28.6 07/16/2019     Lab Results   Component Value Date    MCHC 31.1 07/16/2019     Lab Results   Component Value Date    RDW 14.3 07/16/2019     Lab Results   Component Value Date     07/16/2019

## 2019-07-18 NOTE — PROGRESS NOTES
"DATE & TIME: 2300-0730 7/18/19     Cognitive Concerns/ Orientation : A/Ox3; experiencing hallucinations and paranoia at times    BEHAVIOR & AGGRESSION TOOL COLOR: YELLOW. (d/t hallucinations, paranoia, was throwing the call light \"trying to break the phone\").  CIWA SCORE:    ABNL VS/O2: hypertensive @ 171/79  MOBILITY: Ax2; T/R  PAIN MANAGMENT: denies at this time   DIET: CHO   BOWEL/BLADDER: rico patent   ABNL LAB/BG:    DRAIN/DEVICES: NS @100   TELEMETRY RHYTHM:   SKIN: rt toe wound; CDI. Buttocks; posterior thighs are reddened; open to air   TESTS/PROCEDURES: rt hallux debrided, MRI of rt foot was done, and surgery consulted highly concerned for osteomyelitis,possible amputation Friday   D/C DAY/GOALS/PLACE: Discharge 2+ days   OTHER IMPORTANT INFO: VPM placed.       "

## 2019-07-18 NOTE — PROVIDER NOTIFICATION
MD Notification    Notified Person: MD    Notified Person Name: Georgie    Notification Date/Time: 07/18/19 @ 4:53 PM     Notification Interaction: web paged MD    Purpose of Notification: Patient hypertensive this afternoon, per note to resume PTA amlodipine. However, medication not currently ordered.    Orders Received:    Comments:

## 2019-07-18 NOTE — PROGRESS NOTES
[facility]    Medicine Progress Note - Hospitalist Service       Date of Admission:  7/15/2019  Date of Service: 07/18/2019    Assessment & Plan        Gem Jade is a 73 year old female who presents with decreased level of alertness.     Acute encephalopathy, secondary to catheter related urinary tract infection  Chronic indwelling Johnson  Assessment: History of frequent urinary tract infections associated with chronic indwelling Johnson catheter.  Most recent urine cultures 4/24/2019 and 5/10/2019 demonstrated greater than 100,000 colony-forming units of Citrobacter.  Has a history of VRE, MRSA, and ESBL. Note that patient has followed with infectious disease clinic in the past related to recurrent urinary tract infections with colonization. AMS is likely secondary to recurrent UTI, supra therapeutic Keppra also likely contributing. Neuro feels less likely seizure. UC on admission now with MSSA  Plan:  - Continue indwelling Johnson catheter for retention  - BC with Corynebacterium striatum (contaminant likely per ID)  - IV Invanz -> PO Keflex 07/17  - IVF  - ID following  - Follow vitals/temp     Right Great Toe Redness/Swelling  Osteomyelitis  Assessment: have concern about deep infection in addition to likely cellulitis. MRI confirms likely osteomyelitis. Will plan for amputation tomorrow 07/19. EKG on admission with SR, medically cleared for surgery.   Plan:  - Podiatry consulted  - NPO at midnight  - Abx per ID, currently on Keflex for chronic osteomyelits    Obstructive sleep apnea on CPAP:   -CPAP as per home settings      Type 2 diabetes with retinopathy, nephropathy, neuropathy including diabetic gastroparesis   Assessment: most recent A1c 6.2 on 04/2019. PTA on Glargine 30 U at bedtime.  Plan:  - Continue Glargine 10 U given minimal PO intake and will be NPO at midnight.  - sliding scale insulin as needed  - Hypoglycemia protocol    End-stage renal disease status post historic right lower  quadrant renal transplant:   Assessment: Renal transplant in .  Uncertain if this was living or  donor. Cr on admission 1.98, elevated from baseline 1.54-1.82. Cr improving with IVF.  Plan:  - Tacrolimus level pending  - IVF  - Continue prednisone 5 mg daily at this time  - Continue tacrolimus  - Avoid NSAIDs/nephrotoxins     Severe obesity:   -Increased risk of all cause mortality  -Outpatient follow-up.     History of provoked bilateral PE:   -not on anticoagulation. Follow with PCP     Paroxysmal atrial fibrillation:  Underwent 48-hour Holter monitoring in May 2016 demonstrating sinus rhythm with occasional supraventricular tachycardia.  No atrial fibrillation reported on that study. In sinus rhythm on admission.  At time of event in , recognition was for discussion with primary care provider regarding risk versus benefit of anticoagulation. [id number] score of 3 for age, female sex, diabetes.  Plan:  -Defer further discussion of anticoagulation to primary care provider.  Hold for now anyways given need for surgery of right great tow     History of seizure disorder:   - Keppra level elevated. EEG with no epileptic activity. Neuro was consulted, recommended that given hx of seizure disorder, they recommend continuing and restart of dose reduced at 250 mg bid.    Plan:   - Hold PTA keppra for now given acute hallucinations  - Will need to discuss with  whether to resume prior to discharge. Initial discussions he would like her to be off Keppra     Hypertension:  -Continue prior to admission amlodipine 10 mg daily and Labetalol 150mg BID.      Cystic finding in the pancreatic body.  Incidental on extensive imaging in the emergency department.  Has been noted previously as well.  Question of intraductal papillary mucinous neoplasm.  Patient follows with Dr. Chen of gastroenterology, largely for her gastroparesis.  - follow-up with gastroenterology.      Hypothyroidism:  -Continue prior to  admission levothyroxine 50 mcg daily        Diet: Moderate Consistent CHO Diet  NPO per Anesthesia Guidelines for Procedure/Surgery Except for: Meds    DVT Prophylaxis: Pneumatic Compression Devices  Rico Catheter: in place, indication: Retention  Code Status: Full Code      Disposition Plan   Expected discharge: 2 - 3 days, recommended to prior living arrangement once antibiotic plan established.  Entered: Guero Jacques MD 07/18/2019, 4:19 PM       The patient's care was discussed with the Bedside Nurse, Patient and Patient's Family.    Guero Jacques MD  Hospitalist Service  [facility]    ______________________________________________________________________    Interval History     No acute events overnight  Still intermittently hallucinating/confused throughout day today, but at time of this visit she does appear oriented.   No CP/SOB. No fevers since admission.   No nausea/vomiting  No new complaints from the patient    Data reviewed today: I reviewed all medications, new labs and imaging results over the last 24 hours. I personally reviewed no images or EKG's today.    Physical Exam   Vital Signs: Temp: 97.7  F (36.5  C) Temp src: Oral BP: 156/74 Pulse: 80 Heart Rate: 76 Resp: 16 SpO2: 98 % O2 Device: None (Room air)    Weight: 231 lbs 4.2 oz    GENERAL APPEARANCE:  alert and no distress  RESP: CTABL  CV: RRR with no m/r/g  ABDOMEN: obese otherwise soft, nontender, non-distended  : rico catheter present  NEURO: alert and oriented to person, place and time. Moving all extremities. Neuropathy present in lower extremities, markedly diminished sensation.   MS: Right great toe wrapped today  SKIN: no suspicious lesions or rashes    Data   Recent Labs   Lab 07/18/19  1137 07/17/19  0755 07/16/19  0848 07/15/19  1546   WBC  --   --  8.3 8.4   HGB  --   --  10.3* 11.4*   MCV  --   --  92 93   PLT  --   --  150 200    144 142 141   POTASSIUM 4.2 4.1 4.1 4.8   CHLORIDE 117* 116* 115* 109   CO2 20 22  19* 23   BUN 29 38* 46* 53*   CR 1.55* 1.76* 1.81* 1.98*   ANIONGAP 5 6 8 9   ROGER 8.9 8.7 8.4* 9.3   * 169* 71 192*   ALBUMIN  --   --   --  3.1*   PROTTOTAL  --   --   --  8.5   BILITOTAL  --   --   --  0.3   ALKPHOS  --   --   --  113   ALT  --   --   --  18   AST  --   --   --  16     Recent Results (from the past 24 hour(s))   MR Foot Right w/o Contrast    Narrative    MR FOOT RIGHT WITHOUT CONTRAST   7/17/2019 9:27 PM     HISTORY:  Osteomyelitis suspected, foot swelling, diabetic; necrotic  wound, dorsal right hallux; wound probes to bone and into the  interphalangeal joint.    TECHNIQUE:  Sagittal, long axis, and short axis T1 and inversion  recovery pulse sequences.    COMPARISON: 7/17/2019    FINDINGS:  There appears to be ill-defined marrow edema in the distal  third of the great toe proximal phalanx. There may be a small erosion  along the dorsal aspect of the head. Mild marrow edema is also noted  within the distal phalangeal base. No IP joint fluid is visible. No  metatarsophalangeal joint effusion. Forefoot soft tissue edema is  noted, greatest along the dorsum of the forefoot. No discrete fluid  collection is visible. Marrow signal within remainder of the forefoot  appears essentially within normal limits.      Impression    IMPRESSION:  1. Great toe proximal phalangeal head small cyst versus erosion and  adjacent marrow edema. This is highly suspicious for osteomyelitis,  particularly if there is an adjacent wound or draining sinus.  2. Great toe distal phalangeal base mild marrow edema which could be  reactive or represent an additional site of osteomyelitis.  3. Soft tissue edema in the forefoot, greatest dorsally. MR cannot  distinguish reactive edema from cellulitis. No discrete soft tissue  fluid collection or abscess is visible.    SYLVIA LUGO MD     Medications     sodium chloride 100 mL/hr at 07/18/19 1024       aspirin  81 mg Oral Daily     cephALEXin  500 mg Oral Q12H      famotidine  20 mg Oral Daily     insulin aspart  1-3 Units Subcutaneous TID AC     insulin aspart  1-3 Units Subcutaneous At Bedtime     insulin glargine  10 Units Subcutaneous At Bedtime     labetalol  150 mg Oral BID     levETIRAcetam  250 mg Oral BID     levothyroxine  50 mcg Oral Daily     pravastatin  10 mg Oral Daily     predniSONE  5 mg Oral Daily     sodium chloride (PF)  3 mL Intracatheter Q8H     tacrolimus  2 mg Oral BID

## 2019-07-19 ENCOUNTER — ANESTHESIA EVENT (OUTPATIENT)
Dept: SURGERY | Facility: CLINIC | Age: 74
DRG: 617 | End: 2019-07-19
Payer: MEDICARE

## 2019-07-19 ENCOUNTER — ANESTHESIA (OUTPATIENT)
Dept: SURGERY | Facility: CLINIC | Age: 74
DRG: 617 | End: 2019-07-19
Payer: MEDICARE

## 2019-07-19 LAB
ALBUMIN SERPL-MCNC: 2.6 G/DL (ref 3.4–5)
ALP SERPL-CCNC: 90 U/L (ref 40–150)
ALT SERPL W P-5'-P-CCNC: 11 U/L (ref 0–50)
AMMONIA PLAS-SCNC: 18 UMOL/L (ref 10–50)
ANION GAP SERPL CALCULATED.3IONS-SCNC: 5 MMOL/L (ref 3–14)
AST SERPL W P-5'-P-CCNC: 10 U/L (ref 0–45)
BACTERIA SPEC CULT: ABNORMAL
BILIRUB DIRECT SERPL-MCNC: <0.1 MG/DL (ref 0–0.2)
BILIRUB SERPL-MCNC: 0.3 MG/DL (ref 0.2–1.3)
BUN SERPL-MCNC: 31 MG/DL (ref 7–30)
CALCIUM SERPL-MCNC: 8.5 MG/DL (ref 8.5–10.1)
CHLORIDE SERPL-SCNC: 116 MMOL/L (ref 94–109)
CO2 SERPL-SCNC: 21 MMOL/L (ref 20–32)
CREAT SERPL-MCNC: 1.53 MG/DL (ref 0.52–1.04)
ERYTHROCYTE [DISTWIDTH] IN BLOOD BY AUTOMATED COUNT: 13.9 % (ref 10–15)
GFR SERPL CREATININE-BSD FRML MDRD: 33 ML/MIN/{1.73_M2}
GLUCOSE BLDC GLUCOMTR-MCNC: 105 MG/DL (ref 70–99)
GLUCOSE BLDC GLUCOMTR-MCNC: 107 MG/DL (ref 70–99)
GLUCOSE BLDC GLUCOMTR-MCNC: 112 MG/DL (ref 70–99)
GLUCOSE BLDC GLUCOMTR-MCNC: 114 MG/DL (ref 70–99)
GLUCOSE BLDC GLUCOMTR-MCNC: 130 MG/DL (ref 70–99)
GLUCOSE BLDC GLUCOMTR-MCNC: 140 MG/DL (ref 70–99)
GLUCOSE BLDC GLUCOMTR-MCNC: 169 MG/DL (ref 70–99)
GLUCOSE SERPL-MCNC: 125 MG/DL (ref 70–99)
HCT VFR BLD AUTO: 32.5 % (ref 35–47)
HGB BLD-MCNC: 10.5 G/DL (ref 11.7–15.7)
Lab: ABNORMAL
MCH RBC QN AUTO: 28.8 PG (ref 26.5–33)
MCHC RBC AUTO-ENTMCNC: 32.3 G/DL (ref 31.5–36.5)
MCV RBC AUTO: 89 FL (ref 78–100)
PLATELET # BLD AUTO: 130 10E9/L (ref 150–450)
POTASSIUM SERPL-SCNC: 4.1 MMOL/L (ref 3.4–5.3)
PROT SERPL-MCNC: 7 G/DL (ref 6.8–8.8)
RBC # BLD AUTO: 3.64 10E12/L (ref 3.8–5.2)
SODIUM SERPL-SCNC: 142 MMOL/L (ref 133–144)
SPECIMEN SOURCE: ABNORMAL
WBC # BLD AUTO: 7.3 10E9/L (ref 4–11)

## 2019-07-19 PROCEDURE — 25800030 ZZH RX IP 258 OP 636: Performed by: STUDENT IN AN ORGANIZED HEALTH CARE EDUCATION/TRAINING PROGRAM

## 2019-07-19 PROCEDURE — 99232 SBSQ HOSP IP/OBS MODERATE 35: CPT | Performed by: INTERNAL MEDICINE

## 2019-07-19 PROCEDURE — 27210794 ZZH OR GENERAL SUPPLY STERILE: Performed by: PODIATRIST

## 2019-07-19 PROCEDURE — 25000132 ZZH RX MED GY IP 250 OP 250 PS 637: Mod: GY | Performed by: PSYCHIATRY & NEUROLOGY

## 2019-07-19 PROCEDURE — 25000125 ZZHC RX 250: Performed by: NURSE ANESTHETIST, CERTIFIED REGISTERED

## 2019-07-19 PROCEDURE — 85027 COMPLETE CBC AUTOMATED: CPT | Performed by: STUDENT IN AN ORGANIZED HEALTH CARE EDUCATION/TRAINING PROGRAM

## 2019-07-19 PROCEDURE — 25000128 H RX IP 250 OP 636: Performed by: PODIATRIST

## 2019-07-19 PROCEDURE — 37000009 ZZH ANESTHESIA TECHNICAL FEE, EACH ADDTL 15 MIN: Performed by: PODIATRIST

## 2019-07-19 PROCEDURE — 25800030 ZZH RX IP 258 OP 636: Performed by: PODIATRIST

## 2019-07-19 PROCEDURE — 80076 HEPATIC FUNCTION PANEL: CPT | Performed by: STUDENT IN AN ORGANIZED HEALTH CARE EDUCATION/TRAINING PROGRAM

## 2019-07-19 PROCEDURE — 12000000 ZZH R&B MED SURG/OB

## 2019-07-19 PROCEDURE — 80171 DRUG SCREEN QUANT GABAPENTIN: CPT | Performed by: STUDENT IN AN ORGANIZED HEALTH CARE EDUCATION/TRAINING PROGRAM

## 2019-07-19 PROCEDURE — 88311 DECALCIFY TISSUE: CPT | Mod: 26 | Performed by: PODIATRIST

## 2019-07-19 PROCEDURE — 25800030 ZZH RX IP 258 OP 636: Performed by: NURSE ANESTHETIST, CERTIFIED REGISTERED

## 2019-07-19 PROCEDURE — 25000132 ZZH RX MED GY IP 250 OP 250 PS 637: Mod: GY | Performed by: PODIATRIST

## 2019-07-19 PROCEDURE — 87077 CULTURE AEROBIC IDENTIFY: CPT | Performed by: PODIATRIST

## 2019-07-19 PROCEDURE — 71000012 ZZH RECOVERY PHASE 1 LEVEL 1 FIRST HR: Performed by: PODIATRIST

## 2019-07-19 PROCEDURE — 25800030 ZZH RX IP 258 OP 636: Performed by: ANESTHESIOLOGY

## 2019-07-19 PROCEDURE — 37000008 ZZH ANESTHESIA TECHNICAL FEE, 1ST 30 MIN: Performed by: PODIATRIST

## 2019-07-19 PROCEDURE — 88305 TISSUE EXAM BY PATHOLOGIST: CPT | Mod: 26 | Performed by: PODIATRIST

## 2019-07-19 PROCEDURE — 27110028 ZZH OR GENERAL SUPPLY NON-STERILE: Performed by: PODIATRIST

## 2019-07-19 PROCEDURE — 80048 BASIC METABOLIC PNL TOTAL CA: CPT | Performed by: STUDENT IN AN ORGANIZED HEALTH CARE EDUCATION/TRAINING PROGRAM

## 2019-07-19 PROCEDURE — 25000132 ZZH RX MED GY IP 250 OP 250 PS 637: Mod: GY | Performed by: INTERNAL MEDICINE

## 2019-07-19 PROCEDURE — 88311 DECALCIFY TISSUE: CPT | Performed by: PODIATRIST

## 2019-07-19 PROCEDURE — 28820 AMPUTATION OF TOE: CPT | Mod: T5 | Performed by: PODIATRIST

## 2019-07-19 PROCEDURE — 36000052 ZZH SURGERY LEVEL 2 EA 15 ADDTL MIN: Performed by: PODIATRIST

## 2019-07-19 PROCEDURE — 87186 SC STD MICRODIL/AGAR DIL: CPT | Performed by: PODIATRIST

## 2019-07-19 PROCEDURE — 25000125 ZZHC RX 250: Performed by: PODIATRIST

## 2019-07-19 PROCEDURE — 36415 COLL VENOUS BLD VENIPUNCTURE: CPT | Performed by: STUDENT IN AN ORGANIZED HEALTH CARE EDUCATION/TRAINING PROGRAM

## 2019-07-19 PROCEDURE — 88305 TISSUE EXAM BY PATHOLOGIST: CPT | Performed by: PODIATRIST

## 2019-07-19 PROCEDURE — 25000131 ZZH RX MED GY IP 250 OP 636 PS 637: Mod: GY | Performed by: PODIATRIST

## 2019-07-19 PROCEDURE — 87070 CULTURE OTHR SPECIMN AEROBIC: CPT | Performed by: PODIATRIST

## 2019-07-19 PROCEDURE — 00000146 ZZHCL STATISTIC GLUCOSE BY METER IP

## 2019-07-19 PROCEDURE — 82140 ASSAY OF AMMONIA: CPT | Performed by: STUDENT IN AN ORGANIZED HEALTH CARE EDUCATION/TRAINING PROGRAM

## 2019-07-19 PROCEDURE — 0Y6P0Z0 DETACHMENT AT RIGHT 1ST TOE, COMPLETE, OPEN APPROACH: ICD-10-PCS | Performed by: PODIATRIST

## 2019-07-19 PROCEDURE — 40000169 ZZH STATISTIC PRE-PROCEDURE ASSESSMENT I: Performed by: PODIATRIST

## 2019-07-19 PROCEDURE — 25000131 ZZH RX MED GY IP 250 OP 636 PS 637: Mod: GY | Performed by: STUDENT IN AN ORGANIZED HEALTH CARE EDUCATION/TRAINING PROGRAM

## 2019-07-19 PROCEDURE — 25000128 H RX IP 250 OP 636: Performed by: NURSE ANESTHETIST, CERTIFIED REGISTERED

## 2019-07-19 PROCEDURE — 87075 CULTR BACTERIA EXCEPT BLOOD: CPT | Performed by: PODIATRIST

## 2019-07-19 PROCEDURE — 25000132 ZZH RX MED GY IP 250 OP 250 PS 637: Mod: GY | Performed by: STUDENT IN AN ORGANIZED HEALTH CARE EDUCATION/TRAINING PROGRAM

## 2019-07-19 PROCEDURE — 36000050 ZZH SURGERY LEVEL 2 1ST 30 MIN: Performed by: PODIATRIST

## 2019-07-19 RX ORDER — TRAMADOL HYDROCHLORIDE 50 MG/1
50 TABLET ORAL EVERY 6 HOURS PRN
Status: DISCONTINUED | OUTPATIENT
Start: 2019-07-19 | End: 2019-07-21 | Stop reason: HOSPADM

## 2019-07-19 RX ORDER — FENTANYL CITRATE 50 UG/ML
25-50 INJECTION, SOLUTION INTRAMUSCULAR; INTRAVENOUS
Status: DISCONTINUED | OUTPATIENT
Start: 2019-07-19 | End: 2019-07-19 | Stop reason: HOSPADM

## 2019-07-19 RX ORDER — BUPIVACAINE HYDROCHLORIDE 5 MG/ML
INJECTION, SOLUTION EPIDURAL; INTRACAUDAL
Status: DISCONTINUED
Start: 2019-07-19 | End: 2019-07-19 | Stop reason: HOSPADM

## 2019-07-19 RX ORDER — CEFAZOLIN SODIUM 2 G/100ML
2 INJECTION, SOLUTION INTRAVENOUS ONCE
Status: COMPLETED | OUTPATIENT
Start: 2019-07-19 | End: 2019-07-19

## 2019-07-19 RX ORDER — MEPERIDINE HYDROCHLORIDE 25 MG/ML
12.5 INJECTION INTRAMUSCULAR; INTRAVENOUS; SUBCUTANEOUS EVERY 5 MIN PRN
Status: DISCONTINUED | OUTPATIENT
Start: 2019-07-19 | End: 2019-07-19 | Stop reason: HOSPADM

## 2019-07-19 RX ORDER — LIDOCAINE HYDROCHLORIDE 20 MG/ML
INJECTION, SOLUTION INFILTRATION; PERINEURAL PRN
Status: DISCONTINUED | OUTPATIENT
Start: 2019-07-19 | End: 2019-07-19

## 2019-07-19 RX ORDER — SODIUM CHLORIDE, SODIUM LACTATE, POTASSIUM CHLORIDE, CALCIUM CHLORIDE 600; 310; 30; 20 MG/100ML; MG/100ML; MG/100ML; MG/100ML
INJECTION, SOLUTION INTRAVENOUS CONTINUOUS
Status: CANCELLED | OUTPATIENT
Start: 2019-07-19

## 2019-07-19 RX ORDER — ONDANSETRON 2 MG/ML
INJECTION INTRAMUSCULAR; INTRAVENOUS PRN
Status: DISCONTINUED | OUTPATIENT
Start: 2019-07-19 | End: 2019-07-19

## 2019-07-19 RX ORDER — SODIUM CHLORIDE 9 MG/ML
INJECTION, SOLUTION INTRAVENOUS CONTINUOUS
Status: DISCONTINUED | OUTPATIENT
Start: 2019-07-19 | End: 2019-07-19 | Stop reason: HOSPADM

## 2019-07-19 RX ORDER — NALOXONE HYDROCHLORIDE 0.4 MG/ML
.1-.4 INJECTION, SOLUTION INTRAMUSCULAR; INTRAVENOUS; SUBCUTANEOUS
Status: ACTIVE | OUTPATIENT
Start: 2019-07-19 | End: 2019-07-20

## 2019-07-19 RX ORDER — CEFAZOLIN SODIUM 2 G/100ML
2 INJECTION, SOLUTION INTRAVENOUS ONCE
Status: DISCONTINUED | OUTPATIENT
Start: 2019-07-19 | End: 2019-07-19 | Stop reason: CLARIF

## 2019-07-19 RX ORDER — BUPIVACAINE HYDROCHLORIDE 5 MG/ML
INJECTION, SOLUTION EPIDURAL; INTRACAUDAL PRN
Status: DISCONTINUED | OUTPATIENT
Start: 2019-07-19 | End: 2019-07-19 | Stop reason: HOSPADM

## 2019-07-19 RX ORDER — MAGNESIUM HYDROXIDE 1200 MG/15ML
LIQUID ORAL PRN
Status: DISCONTINUED | OUTPATIENT
Start: 2019-07-19 | End: 2019-07-19 | Stop reason: HOSPADM

## 2019-07-19 RX ORDER — PROPOFOL 10 MG/ML
INJECTION, EMULSION INTRAVENOUS PRN
Status: DISCONTINUED | OUTPATIENT
Start: 2019-07-19 | End: 2019-07-19

## 2019-07-19 RX ORDER — SODIUM CHLORIDE, SODIUM LACTATE, POTASSIUM CHLORIDE, CALCIUM CHLORIDE 600; 310; 30; 20 MG/100ML; MG/100ML; MG/100ML; MG/100ML
INJECTION, SOLUTION INTRAVENOUS CONTINUOUS
Status: DISCONTINUED | OUTPATIENT
Start: 2019-07-19 | End: 2019-07-19 | Stop reason: HOSPADM

## 2019-07-19 RX ORDER — CEFAZOLIN SODIUM IN 0.9 % NACL 3 G/100 ML
3 INTRAVENOUS SOLUTION, PIGGYBACK (ML) INTRAVENOUS ONCE
Status: DISCONTINUED | OUTPATIENT
Start: 2019-07-19 | End: 2019-07-19 | Stop reason: CLARIF

## 2019-07-19 RX ORDER — ONDANSETRON 2 MG/ML
4 INJECTION INTRAMUSCULAR; INTRAVENOUS EVERY 30 MIN PRN
Status: DISCONTINUED | OUTPATIENT
Start: 2019-07-19 | End: 2019-07-19 | Stop reason: HOSPADM

## 2019-07-19 RX ORDER — ALBUTEROL SULFATE 0.83 MG/ML
2.5 SOLUTION RESPIRATORY (INHALATION) EVERY 4 HOURS PRN
Status: DISCONTINUED | OUTPATIENT
Start: 2019-07-19 | End: 2019-07-19 | Stop reason: HOSPADM

## 2019-07-19 RX ORDER — PROPOFOL 10 MG/ML
INJECTION, EMULSION INTRAVENOUS CONTINUOUS PRN
Status: DISCONTINUED | OUTPATIENT
Start: 2019-07-19 | End: 2019-07-19

## 2019-07-19 RX ORDER — ONDANSETRON 4 MG/1
4 TABLET, ORALLY DISINTEGRATING ORAL EVERY 30 MIN PRN
Status: DISCONTINUED | OUTPATIENT
Start: 2019-07-19 | End: 2019-07-19 | Stop reason: HOSPADM

## 2019-07-19 RX ADMIN — SODIUM CHLORIDE, PRESERVATIVE FREE: 5 INJECTION INTRAVENOUS at 23:37

## 2019-07-19 RX ADMIN — DEXMEDETOMIDINE HYDROCHLORIDE 8 MCG: 100 INJECTION, SOLUTION INTRAVENOUS at 14:23

## 2019-07-19 RX ADMIN — LEVETIRACETAM 250 MG: 250 TABLET, FILM COATED ORAL at 20:50

## 2019-07-19 RX ADMIN — TRAMADOL HYDROCHLORIDE 50 MG: 50 TABLET, COATED ORAL at 20:50

## 2019-07-19 RX ADMIN — LEVOTHYROXINE SODIUM 50 MCG: 50 TABLET ORAL at 09:59

## 2019-07-19 RX ADMIN — SODIUM CHLORIDE, PRESERVATIVE FREE: 5 INJECTION INTRAVENOUS at 12:02

## 2019-07-19 RX ADMIN — CEPHALEXIN 500 MG: 500 CAPSULE ORAL at 02:19

## 2019-07-19 RX ADMIN — Medication 150 MG: at 10:00

## 2019-07-19 RX ADMIN — LEVETIRACETAM 250 MG: 250 TABLET, FILM COATED ORAL at 09:59

## 2019-07-19 RX ADMIN — ONDANSETRON 4 MG: 2 INJECTION INTRAMUSCULAR; INTRAVENOUS at 14:23

## 2019-07-19 RX ADMIN — LIDOCAINE HYDROCHLORIDE 20 MG: 20 INJECTION, SOLUTION INFILTRATION; PERINEURAL at 14:23

## 2019-07-19 RX ADMIN — INSULIN GLARGINE 10 UNITS: 100 INJECTION, SOLUTION SUBCUTANEOUS at 00:13

## 2019-07-19 RX ADMIN — CEPHALEXIN 500 MG: 500 CAPSULE ORAL at 20:50

## 2019-07-19 RX ADMIN — INSULIN GLARGINE 10 UNITS: 100 INJECTION, SOLUTION SUBCUTANEOUS at 21:39

## 2019-07-19 RX ADMIN — TACROLIMUS 2 MG: 1 CAPSULE ORAL at 10:04

## 2019-07-19 RX ADMIN — PROPOFOL 20 MCG/KG/MIN: 10 INJECTION, EMULSION INTRAVENOUS at 14:30

## 2019-07-19 RX ADMIN — SODIUM CHLORIDE, PRESERVATIVE FREE: 5 INJECTION INTRAVENOUS at 06:19

## 2019-07-19 RX ADMIN — PREDNISONE 5 MG: 5 TABLET ORAL at 09:58

## 2019-07-19 RX ADMIN — TACROLIMUS 2 MG: 1 CAPSULE ORAL at 20:51

## 2019-07-19 RX ADMIN — AMLODIPINE BESYLATE 10 MG: 10 TABLET ORAL at 09:58

## 2019-07-19 RX ADMIN — PROPOFOL 30 MG: 10 INJECTION, EMULSION INTRAVENOUS at 14:23

## 2019-07-19 RX ADMIN — Medication 150 MG: at 20:47

## 2019-07-19 RX ADMIN — FAMOTIDINE 20 MG: 20 TABLET, FILM COATED ORAL at 10:04

## 2019-07-19 RX ADMIN — SODIUM CHLORIDE: 9 INJECTION, SOLUTION INTRAVENOUS at 13:38

## 2019-07-19 RX ADMIN — CEFAZOLIN SODIUM 2 G: 2 INJECTION, SOLUTION INTRAVENOUS at 14:22

## 2019-07-19 ASSESSMENT — ACTIVITIES OF DAILY LIVING (ADL)
ADLS_ACUITY_SCORE: 29
ADLS_ACUITY_SCORE: 28
ADLS_ACUITY_SCORE: 29
ADLS_ACUITY_SCORE: 29
ADLS_ACUITY_SCORE: 31

## 2019-07-19 ASSESSMENT — LIFESTYLE VARIABLES: TOBACCO_USE: 1

## 2019-07-19 ASSESSMENT — ENCOUNTER SYMPTOMS
SEIZURES: 1
DYSRHYTHMIAS: 1

## 2019-07-19 NOTE — OP NOTE
Procedure Date: 07/19/2019      SURGEON:  Armen Jefferson DPM      PREOPERATIVE DIAGNOSES:  Chronic ulceration and osteomyelitis, right hallux.      POSTOPERATIVE DIAGNOSES:  Chronic ulceration and osteomyelitis, right hallux.      PROCEDURE:  Amputation of the right hallux at the metatarsophalangeal joint level.      ANESTHESIA:  MAC with local.      HEMOSTASIS:  Electrocautery.      ESTIMATED BLOOD LOSS:  10 mL.      MATERIALS:  Nonabsorbable suture material.      INJECTABLES:  0.5% Marcaine plain injected preoperatively.      COMPLICATIONS:  None apparent.      INTRAOPERATIVE FINDINGS:  All tissues at the level of amputation appeared grossly viable and free of infection.      INDICATIONS FOR PROCEDURE:  Gem Jade is a 73-year-old female who was admitted due to altered mental status and found to have acute encephalopathy, believed to be from a urinary tract infection.  She has type 2 diabetes, peripheral neuropathy and a chronic ulceration on the right hallux.  Clinical exam was concerning for osteomyelitis and possible septic joint.  MRI was consistent with osteomyelitis.  Therefore, a toe amputation was recommended.  I discussed this in detail with Gem.  My colleague, Dr. Irby discussed this with Gem and her  yesterday.  Prior to surgery, I reviewed the planned procedure, as well as the expected postoperative course.  She elected to proceed with the surgery.      DESCRIPTION OF PROCEDURE:  Gem Jade was transported to the operating room and placed supine on the operating table.  IV sedation was initiated.  Local anesthetic was injected into the right foot.  The right foot was then prepped and draped in the normal aseptic fashion.  The timeout was called.      The standard fish-mouth type incision was made around the base of the right hallux.  The incision was taken down to the level of bone.  The toe was disarticulated at the metatarsophalangeal joint level.  The incision that was made  allowed for an adequate dorsal and plantar flap to provide primary closure.  Exposed tendons were grasped, distracted, and cut proximally.  Electrocautery was used for hemostasis.  The wound was irrigated with a copious amount of normal sterile saline.  The dorsal and plantar flaps were then approximated with 3-0 Prolene.      A well-padded compressive dressing was placed on Gem Jade's right foot.  She tolerated the anesthesia and procedure well.  She was transported to the postanesthesia care unit in stable condition.         SHUN ALDANA DPM             D: 2019   T: 2019   MT:       Name:     GEM JADE   MRN:      [id number]        Account:        [id number]   :      1945           Procedure Date: 2019      Document: [id number]

## 2019-07-19 NOTE — PROGRESS NOTES
[facility]    Medicine Progress Note - Hospitalist Service       Date of Admission:  7/15/2019  Date of Service: 07/19/2019    Assessment & Plan        Gem Jade is a 73 year old female who presents with decreased level of alertness.     Acute encephalopathy, secondary to catheter related urinary tract infection  Chronic indwelling Johnson  Assessment: History of frequent urinary tract infections associated with chronic indwelling Johnson catheter.  Most recent urine cultures 4/24/2019 and 5/10/2019 demonstrated greater than 100,000 colony-forming units of Citrobacter.  Has a history of VRE, MRSA, and ESBL. Note that patient has followed with infectious disease clinic in the past related to recurrent urinary tract infections with colonization. AMS is likely secondary to recurrent UTI, supra therapeutic Keppra also likely contributing. Neuro feels less likely seizure. UC on admission now with MSSA  Plan:  - Continue indwelling Johnson catheter for retention  - BC with Corynebacterium striatum (contaminant likely per ID)  - IV Invanz -> PO Keflex 07/17 till 7/29  - IVF  - ID signing off  - Follow vitals/temp     Right Great Toe Redness/Swelling  Osteomyelitis  Assessment: have concern about deep infection in addition to likely cellulitis. MRI confirms likely osteomyelitis. Will plan for amputation tomorrow 07/19. EKG on admission with SR, medically cleared for surgery.   Plan:  - Podiatry consulted   - amputation on the right great toe.   - Abx per ID, currently on Keflex for chronic osteomyelits and UTI due for an other 10 days per ID note     Obstructive sleep apnea on CPAP:   -CPAP as per home settings      Type 2 diabetes with retinopathy, nephropathy, neuropathy including diabetic gastroparesis   Assessment: most recent A1c 6.2 on 04/2019. PTA on Glargine 30 U at bedtime.  Plan:  - resume post op DM plan as prior   - sliding scale insulin as needed  - Hypoglycemia protocol    End-stage renal  disease status post historic right lower quadrant renal transplant:   Assessment: Renal transplant in .  Uncertain if this was living or  donor. Cr on admission 1.98, elevated from baseline 1.54-1.82. Cr improving with IVF. 1.53 today  Plan:  - Tacrolimus level pending  - IVF  - Continue prednisone 5 mg daily at this time  - Continue tacrolimus  - Avoid NSAIDs/nephrotoxins     Severe obesity:   -Increased risk of all cause mortality  -Outpatient follow-up.     History of provoked bilateral PE:   -not on anticoagulation. Follow with PCP     Paroxysmal atrial fibrillation:  Underwent 48-hour Holter monitoring in May 2016 demonstrating sinus rhythm with occasional supraventricular tachycardia.  No atrial fibrillation reported on that study. In sinus rhythm on admission.  At time of event in , recognition was for discussion with primary care provider regarding risk versus benefit of anticoagulation. [id number] score of 3 for age, female sex, diabetes.  Plan:  -Defer further discussion of anticoagulation to primary care provider.  Hold for now anyways given need for surgery of right great tow     History of seizure disorder:   - Keppra level elevated. EEG with no epileptic activity. Neuro was consulted, recommended that given hx of seizure disorder, they recommend continuing and restart of dose reduced at 250 mg bid.    Plan:   - Hold PTA keppra for now given acute hallucinations  - Will need to discuss with  whether to resume prior to discharge. Initial discussions he would like her to be off Keppra but unclear if he knows about neurology recommendation     Hypertension:  -Continue prior to admission amlodipine 10 mg daily and Labetalol 150mg BID.      Cystic finding in the pancreatic body.  Incidental on extensive imaging in the emergency department.  Has been noted previously as well.  Question of intraductal papillary mucinous neoplasm.  Patient follows with Dr. Chen of gastroenterology,  largely for her gastroparesis.  - follow-up with gastroenterology.      Hypothyroidism:  -Continue prior to admission levothyroxine 50 mcg daily        Diet: NPO per Anesthesia Guidelines for Procedure/Surgery Except for: Meds    DVT Prophylaxis: Pneumatic Compression Devices  Rico Catheter: in place, indication: Retention  Code Status: Full Code      Disposition Plan   Expected discharge: 2 - 3 days, recommended to prior living arrangement once antibiotic plan established.  Entered: Bhargav Craven MD 07/19/2019, 1:09 PM       The patient's care was discussed with the Bedside Nurse, Patient and Patient's Family.    Bhargav Craven MD  Hospitalist Service  [facility]    ______________________________________________________________________    Interval History   Seen in PACU. uneventfull surgery. No new symot  No CP/SOB. No fevers since admission.   No nausea/vomiting  No new complaints from the patient    Data reviewed today: I reviewed all medications, new labs and imaging results over the last 24 hours. I personally reviewed no images or EKG's today.    Physical Exam   Vital Signs: Temp: 98.1  F (36.7  C) Temp src: Oral BP: 157/78   Heart Rate: 66 Resp: 16 SpO2: 96 % O2 Device: None (Room air)    Weight: 239 lbs 13.77 oz    GENERAL APPEARANCE:  alert and no distress  RESP: CTABL  CV: RRR with no m/r/g  ABDOMEN: obese otherwise soft, nontender, non-distended  : rico catheter present  NEURO: alert and oriented to person, place and time. Moving all extremities.   MS: Right foot wrapped in surgical dressing  SKIN: no suspicious lesions or rashes    Data   Recent Labs   Lab 07/19/19  0832 07/18/19  1137 07/17/19  0755 07/16/19  0848 07/15/19  1546   WBC 7.3  --   --  8.3 8.4   HGB 10.5*  --   --  10.3* 11.4*   MCV 89  --   --  92 93   *  --   --  150 200    142 144 142 141   POTASSIUM 4.1 4.2 4.1 4.1 4.8   CHLORIDE 116* 117* 116* 115* 109   CO2 21 20 22 19* 23   BUN  31* 29 38* 46* 53*   CR 1.53* 1.55* 1.76* 1.81* 1.98*   ANIONGAP 5 5 6 8 9   ROGER 8.5 8.9 8.7 8.4* 9.3   * 157* 169* 71 192*   ALBUMIN 2.6*  --   --   --  3.1*   PROTTOTAL 7.0  --   --   --  8.5   BILITOTAL 0.3  --   --   --  0.3   ALKPHOS 90  --   --   --  113   ALT 11  --   --   --  18   AST 10  --   --   --  16     No results found for this or any previous visit (from the past 24 hour(s)).  Medications     Patient RECEIVING antibiotic to treat a different condition and it provides ADEQUATE COVERAGE for this surgical procedure.       sodium chloride       sodium chloride 100 mL/hr at 07/19/19 1202       amLODIPine  10 mg Oral Daily     aspirin  81 mg Oral Daily     cephALEXin  500 mg Oral Q12H     famotidine  20 mg Oral Daily     insulin aspart  1-3 Units Subcutaneous TID AC     insulin aspart  1-3 Units Subcutaneous At Bedtime     insulin glargine  10 Units Subcutaneous At Bedtime     labetalol  150 mg Oral BID     levETIRAcetam  250 mg Oral BID     levothyroxine  50 mcg Oral Daily     pravastatin  10 mg Oral Daily     predniSONE  5 mg Oral Daily     sodium chloride (PF)  3 mL Intracatheter Q8H     tacrolimus  2 mg Oral BID

## 2019-07-19 NOTE — PROGRESS NOTES
[facility]    Infectious Disease Progress Note    Date of Service (when I saw the patient): 07/19/2019     Assessment & Plan   Gem Jade is a 73 year old female who was admitted on 7/15/2019.       Impression:  1. 73 y.o female familiar to me from numerous prior admission with similar presentation.   2. History of renal transplant, chronic indwelling rico. On immunosuppressives.   3. Diabetes.   4. Previous history of MDR.   5. Admitted again with altered mental status.   6. UA grossly abnormal.   7. UC with MSSA.   8. On ertapenem.  9. 1/2 blood cultures, diphtheroid,  prob contaminant.      Recommendations:   Plans for amputation on the right great toe.   Keflex for UTI and chronic osteo.  Do another 10 days   Signing off please call if ques.    Paramjit Mckeznie MD    Interval History   Afebrile   Off and on confusion and agitation     Physical Exam   Temp: 98.1  F (36.7  C) Temp src: Oral BP: 157/78   Heart Rate: 66 Resp: 16 SpO2: 96 % O2 Device: None (Room air)    Vitals:    07/17/19 0646 07/18/19 0625 07/19/19 0416   Weight: 105 kg (231 lb 7.7 oz) 104.9 kg (231 lb 4.2 oz) 108.8 kg (239 lb 13.8 oz)     Vital Signs with Ranges  Temp:  [98.1  F (36.7  C)-98.4  F (36.9  C)] 98.1  F (36.7  C)  Heart Rate:  [66-79] 66  Resp:  [16] 16  BP: (157-181)/(69-91) 157/78  SpO2:  [95 %-97 %] 96 %    Constitutional: Awake, alert, cooperative, no apparent distress  Lungs: Clear to auscultation bilaterally, no crackles or wheezing  Cardiovascular: Regular rate and rhythm, normal S1 and S2, and no murmur noted  Abdomen: Normal bowel sounds, soft, non-distended, non-tender  Skin: No rashes, no cyanosis, no edema  Other:    Medications     sodium chloride 100 mL/hr at 07/19/19 0619       amLODIPine  10 mg Oral Daily     aspirin  81 mg Oral Daily     cephALEXin  500 mg Oral Q12H     famotidine  20 mg Oral Daily     insulin aspart  1-3 Units Subcutaneous TID AC     insulin aspart  1-3 Units Subcutaneous At Bedtime      insulin glargine  10 Units Subcutaneous At Bedtime     labetalol  150 mg Oral BID     levETIRAcetam  250 mg Oral BID     levothyroxine  50 mcg Oral Daily     pravastatin  10 mg Oral Daily     predniSONE  5 mg Oral Daily     sodium chloride (PF)  3 mL Intracatheter Q8H     tacrolimus  2 mg Oral BID       Data   All microbiology laboratory data reviewed.  Recent Labs   Lab Test 07/19/19  0832 07/16/19  0848 07/15/19  1546   WBC 7.3 8.3 8.4   HGB 10.5* 10.3* 11.4*   HCT 32.5* 33.1* 36.3   MCV 89 92 93   * 150 200     Recent Labs   Lab Test 07/19/19  0832 07/18/19  1137 07/17/19  0755   CR 1.53* 1.55* 1.76*     Recent Labs   Lab Test 05/25/16  2116   SED 53*     Recent Labs   Lab Test 07/15/19  1536 07/15/19  1535 06/04/19  0738 06/02/19  2122 06/02/19  2111 06/02/19  2028 05/10/19  1205 04/24/19  1405 04/24/19  1258   CULT Cultured on the 2nd day of incubation:  Corynebacterium striatum  Identification obtained by MALDI-TOF mass spectrometry research use only database. Test   characteristics determined and verified by the Infectious [facility] Diagnostic Laboratory   ([facility]) [place], [place].  *  Critical Value/Significant Value, preliminary result only, called to and read back by  Paddy Breen Rn @ 0752 7.17.19 CS    (Note)  NEGATIVE for the following: Staphylococcus spp., Staph aureus, Staph  epidermidis, Staph lugdunensis, Streptococcus spp., Strep pneumoniae,  Strep pyogenes, Strep agalactiae, Strep anginosus group, Enterococcus  faecalis, Enterococcus faecium, and Listeria spp. by [employer]  multiplex nucleic acid test. Final identification and antimicrobial  susceptibility testing will be verified by standard methods.    Critical Value/Significant Value called to and read back by Paddy Breen RN @ 1042 7.17.19 CS     No growth after 4 days  50,000 to 100,000 colonies/mL  Staphylococcus aureus  * No growth >100,000 colonies/mL  Citrobacter freundii complex  *  >100,000 colonies/mL  Staphylococcus  aureus  * Cultured on the 1st day of incubation:  Staphylococcus capitis  This isolate DOES NOT demonstrate inducible clindamycin resistance in vitro. Clindamycin   is susceptible and could be used when indicated, however, erythromycin is resistant and   should not be used.  *  Critical Value/Significant Value, preliminary result only, called to and read back by  Jyoti Brower RN on 06.03.2019 at 2151. JRT    (Note)  POSITIVE for Staphylococci other than S.aureus, S.epidermidis and  S.lugdunensis, by [employer] multiplex nucleic acid test.  Coagulase-negative staphylococci are the most common venipuncture or  collection associated skin CONTAMINANTS grown in blood cultures.  Final identification and antimicrobial susceptibility testing will be  verified by standard methods.    Specimen tested with [employer] multiplex, gram-positive blood culture  nucleic acid test for the following targets: Staph aureus, Staph  epidermidis, Staph lugdunensis, other Staph species, Enterococcus  faecalis, Enterococcus faecium, Streptococcus species, S. agalactiae,  S. anginosus grp., S. pneumoniae, S. pyogenes, Listeria sp., mecA  (methicillin resistance) and Viridiana/B (vancomycin resistance).    Critical Value/Significant Value called to and read back by  DENNIS RAMIREZ RN ([id number]).  06.04.19  0028 GJS   No growth >100,000 colonies/mL  Citrobacter freundii complex  * >100,000 colonies/mL  Citrobacter freundii complex  * No growth  No growth       Attestation:  Total time on the floor involved in the patient's care: 35 minutes. Total time spent in counseling/care coordination: >50%

## 2019-07-19 NOTE — ANESTHESIA PREPROCEDURE EVALUATION
Anesthesia Pre-Procedure Evaluation    Patient: Gem Jade   MRN: [id number] : 1945          Preoperative Diagnosis: RIGHT FIRST TOE OSTEOMYELITIS    Procedure(s):  RIGHT FIRST TOE AMPUTATION    Past Medical History:   Diagnosis Date     Background diabetic retinopathy(362.01)     extensive diabetic retinopathy, s/p multiple laser treatments     Diabetic gastroparesis (H)     followed by [facility] (Dr. Chen)     Diarrhea      Dizziness and giddiness      Hyponatremia 11    Na 121     Kidney replaced by transplant      Mixed hyperlipidemia      Obesity      Osteopenia     per DEXA     Other and unspecified hyperlipidemia      Other pulmonary embolism and infarction 3/03    Bilateral pulmonary emboli post op after knee replacement     Polyneuropathy in diabetes(357.2)      Primary localized osteoarthrosis, lower leg      Pyelonephritis, unspecified      Type 2 diabetes mellitus with complications (H)      Urinary tract infection, site not specified     Chronic UTIs     UTI (urinary tract infection) due to urinary indwelling catheter (H)      Past Surgical History:   Procedure Laterality Date     C NONSPECIFIC PROCEDURE  10/99    kidney transplant     C NONSPECIFIC PROCEDURE      MRI head, lumbar spine, pelvis     C TOTAL KNEE ARTHROPLASTY  3/03    Knee Replacement, Total right, post op PE     CHOLECYSTECTOMY       HC LEFT HEART CATHETERIZATION      Cardiac Cath, Left Heart, Negative  (@ [facility])     HC LEFT HEART CATHETERIZATION      normal coronary angi[facility], had mild troponin rise (0.71)     HC REMV CATARACT EXTRACAP,INSERT LENS      bilateral cataract repaired, left eye        Anesthesia Evaluation     . Pt has had prior anesthetic.     No history of anesthetic complications          ROS/MED HX    ENT/Pulmonary:     (+)sleep apnea, tobacco use, Past use uses CPAP , . .    Neurologic:     (+)neuropathy - bilateral whole feet and fingers, seizures features: Uncertain of  seizure dx, on levetiracetam prophylactically,     Cardiovascular:     (+) Dyslipidemia, hypertension----. : . . . :. dysrhythmias a-fib, . Previous cardiac testing Echodate:2017results:Interpretation Summary  Global and regional left ventricular function is normal with an EF of 60-65%.  Right ventricular function, chamber size, wall motion, and thickness are  normal.  The inferior vena cava is normal.  No pericardial effusion is present.  _____________________________________________________________________________  __        Left Ventricle  Global and regional left ventricular function is normal with an EF of 60-65%.  Left ventricular size is normal. Mild concentric wall thickening consistent  with left ventricular hypertrophy is present. Left ventricular diastolic  function is indeterminate. No regional wall motion abnormalities are seen.     Right Ventricle  Right ventricular function, chamber size, wall motion, and thickness are  normal.     Atria  The right atria appears normal. Mild left atrial enlargement is present. The  atrial septum is intact as assessed by agitated dextrose bubble study .        Mitral Valve  Severe mitral annular calcification is present. Mild mitral stenosis is  present. The mean mitral valve gradient is 4.4 mmHg.     Aortic Valve  Mild aortic valve sclerosis is present.     Tricuspid Valve  The tricuspid valve is normal. Pulmonary artery systolic pressure cannot be  assessed.     Pulmonic Valve  The pulmonic valve is normal.     Vessels  The aorta root is normal. The pulmonary artery is normal. The inferior vena  cava is normal. Ascending aorta 3.7 cm.     Pericardium  No pericardial effusion is present.date: results:ECG reviewed date:7/2019 results:NSR date: results:         (-) CAD   METS/Exercise Tolerance:     Hematologic:         Musculoskeletal:         GI/Hepatic:        (-) GERD and liver disease   Renal/Genitourinary:     (+) chronic renal disease, type: CRI, Pt has history  "of transplant, date: 1999,       Endo: Comment: BMI 39    (+) type II DM Diabetic complications: gastroparesis, thyroid problem hypothyroidism, Obesity, .      Psychiatric:         Infectious Disease: Comment: Immunosuppressed 2/2 kidney transplant  (+) MRSA, VRE,       Malignancy:         Other:                          Physical Exam      Airway   Mallampati: III  TM distance: >3 FB  Neck ROM: full    Dental   (+) loose  Comment: Multiple loose/chipped teeth    Cardiovascular   Rhythm and rate: regular      Pulmonary    breath sounds clear to auscultation            Lab Results   Component Value Date    WBC 7.3 07/19/2019    HGB 10.5 (L) 07/19/2019    HCT 32.5 (L) 07/19/2019     (L) 07/19/2019    CRP 7.5 03/08/2017    SED 53 (H) 05/25/2016     07/19/2019    POTASSIUM 4.1 07/19/2019    CHLORIDE 116 (H) 07/19/2019    CO2 21 07/19/2019    BUN 31 (H) 07/19/2019    CR 1.53 (H) 07/19/2019     (H) 07/19/2019    ROGER 8.5 07/19/2019    PHOS 2.9 03/05/2019    MAG 2.3 03/05/2019    ALBUMIN 2.6 (L) 07/19/2019    PROTTOTAL 7.0 07/19/2019    ALT 11 07/19/2019    AST 10 07/19/2019    ALKPHOS 90 07/19/2019    BILITOTAL 0.3 07/19/2019    LIPASE 99 11/06/2017    AMYLASE <30 (L) 05/14/2006    NOREEN 18 07/19/2019    PTT 35 11/06/2017    INR 1.09 02/28/2019    FIBR 563 (H) 09/01/2016    TSH 2.43 07/01/2019    T4 0.82 07/01/2019    CKMB 3.7 04/09/2005       Preop Vitals  BP Readings from Last 3 Encounters:   07/19/19 157/78   06/14/19 140/70   06/07/19 158/53    Pulse Readings from Last 3 Encounters:   07/18/19 80   06/05/19 84   04/26/19 75      Resp Readings from Last 3 Encounters:   07/19/19 16   06/07/19 18   04/27/19 16    SpO2 Readings from Last 3 Encounters:   07/19/19 96%   06/07/19 94%   04/27/19 100%      Temp Readings from Last 1 Encounters:   07/19/19 36.7  C (98.1  F) (Oral)    Ht Readings from Last 1 Encounters:   06/14/19 1.702 m (5' 7\")      Wt Readings from Last 1 Encounters:   07/19/19 108.8 kg (239 " "lb 13.8 oz)    Estimated body mass index is 37.57 kg/m  as calculated from the following:    Height as of 6/14/19: 1.702 m (5' 7\").    Weight as of this encounter: 108.8 kg (239 lb 13.8 oz).       Anesthesia Plan      History & Physical Review  History and physical reviewed and following examination; no interval change.    ASA Status:  3 .    NPO Status:  > 8 hours    Plan for MAC   PONV prophylaxis:  Ondansetron (or other 5HT-3)  Light MAC, no feeling in feet  Normal saline for IVF  Patient unsure of hydromorphone/morphine reactions - avoid opioids      Postoperative Care  Postoperative pain management:  Multi-modal analgesia.      Consents  Anesthetic plan, risks, benefits and alternatives discussed with:  Patient..                 Wang Watson MD  "

## 2019-07-19 NOTE — PLAN OF CARE
DATE & TIME: 7/19/49 0457      Cognitive Concerns/ Orientation: A&Ox4  BEHAVIOR & AGGRESSION TOOL COLOR: Green  CIWA SCORE: NA  ABNL VS/O2: VSS on RA  MOBILITY: assist of 2/lift, T&R Q2  PAIN MANAGMENT: neck pain 6/10, declines pain medication  DIET: NPO since 0000  BOWEL/BLADDER: Chronic Johnson, incont of stool, 1 BM   ABNL LAB/BG: Creat 1.55  DRAIN/DEVICES: PIV NS at 100; Chronic Johnson  TELEMETRY RHYTHM: NA  SKIN: Redness on buttocks and posterior thighs, rt toe wound.  TESTS/PROCEDURES: Rt toe amputation today at 1400.  D/C DAY/GOALS/PLACE: Pending medical improvement.  OTHER IMPORTANT INFO: VPM in place. Contact precautions maintained.

## 2019-07-19 NOTE — PLAN OF CARE
DATE & TIME: 7/19/2019 7- 3pm                  Cognitive Concerns/ Orientation : A & O x4, forgetful. Calm and cooperative. Nervous about surgery this afternoon  BEHAVIOR & AGGRESSION TOOL COLOR: Green  CIWA SCORE: N/A  ABNL VS/O2: VSS on RA  MOBILITY: Total care/A2 turn and repo q2hrs. Up w/ a lift   PAIN MANAGMENT: Declined pain intervention. C/O of mild bilateral leg/thigh pain this am   DIET: NPO except meds  BOWEL/BLADDER: Incontinent of bowel, Johnson patent.   ABNL LAB/BG: Cr 1.53, Hgb 10.5. /105  DRAIN/DEVICES: New IV placed by IV team, NS running @ 100 mL/hr.  TELEMETRY RHYTHM: N/A  SKIN: Pale, significant bruising throughout. Excoriation in groin, Intra dry placed. R foot dressing CDI. Coccyx and bilateral posterior thighs blanchable redness with excoriation.   TESTS/PROCEDURES: R great toe amputation scheduled for 1400.  D/C DAY/GOALS/PLACE: Pending  OTHER IMPORTANT INFO: ID signing off, on oral Keflex. Podiatry following. VPM in place. Contact precautions maintained. Pt refused PCDs states they cause her pain.

## 2019-07-19 NOTE — PLAN OF CARE
[id number] A&O, forgetful. VS stable, except elevated BP - MD notified and PTA amlodipine ordered and given. Assist x2 with mechanical lift, turn and reposition every 2 hours. Coccyx and bilateral posterior thighs blanchable redness with excoriation. Johnson in place, leaking noted - new pad placed under patient at 1645 to assess leaking. Tolerating mod CHO diet. Right great toe wound, wrapped, dressing clean, dry and intact. Plan for right great toe amputation tomorrow.

## 2019-07-19 NOTE — ANESTHESIA CARE TRANSFER NOTE
Patient: Gem Jade    Procedure(s):  RIGHT FIRST TOE AMPUTATION    Diagnosis: RIGHT FIRST TOE OSTEOMYELITIS  Diagnosis Additional Information: No value filed.    Anesthesia Type:   MAC     Note:  Airway :Face Mask  Patient transferred to:PACU  Comments: At end of procedure, spontaneous respirations, patient alert to voice, able to follow commands. Oxygen via facemask at 6 liters per minute to PACU. Oxygen tubing connected to wall O2 in PACU, SpO2, NiBP, and EKG monitors and alarms on and functioning, Eve Hugger warmer connected to patient gown, report on patient's clinical status given to PACU RN, RN questions answered.Handoff Report: Identifed the Patient, Identified the Reponsible Provider, Reviewed the pertinent medical history, Discussed the surgical course, Reviewed Intra-OP anesthesia mangement and issues during anesthesia, Set expectations for post-procedure period and Allowed opportunity for questions and acknowledgement of understanding      Vitals: (Last set prior to Anesthesia Care Transfer)    CRNA VITALS  7/19/2019 1435 - 7/19/2019 1520      7/19/2019             Pulse:  70    SpO2:  99 %    Resp Rate (set):  BP:  Temp:  19  147/67  36.7                Electronically Signed By: ANTHONY Galloway CRNA  July 19, 2019  3:20 PM

## 2019-07-19 NOTE — ANESTHESIA POSTPROCEDURE EVALUATION
Patient: Gem Jade    Procedure(s):  RIGHT FIRST TOE AMPUTATION    Diagnosis:RIGHT FIRST TOE OSTEOMYELITIS  Diagnosis Additional Information: No value filed.    Anesthesia Type:  MAC    Note:  Anesthesia Post Evaluation    Patient location during evaluation: Bedside  Patient participation: Able to fully participate in evaluation  Level of consciousness: awake and alert  Pain management: adequate  Airway patency: patent  Cardiovascular status: acceptable  Respiratory status: acceptable  Hydration status: acceptable  PONV: none             Last vitals:  Vitals:    07/19/19 1610 07/19/19 1620 07/19/19 1630   BP: 159/89 143/65 146/70   Pulse: 72 72 73   Resp: 12 14 14   Temp: 36.6  C (97.9  F)     SpO2: 94% 95% 96%         Electronically Signed By: Wang Watson MD  July 19, 2019  4:36 PM

## 2019-07-19 NOTE — BRIEF OP NOTE
[facility]    Brief Operative Note    Pre-operative diagnosis: RIGHT FIRST TOE OSTEOMYELITIS  Post-operative diagnosis * No post-op diagnosis entered *  Procedure: Procedure(s):  RIGHT FIRST TOE AMPUTATION  Surgeon: Surgeon(s) and Role:     * Armen Jefferson DPM - Primary  Anesthesia: Monitor Anesthesia Care   Estimated blood loss: 10ml  Drains: None  Specimens:   ID Type Source Tests Collected by Time Destination   1 : RIGHT FIRST TOE BONE CULTURE Bone Toe ANAEROBIC BACTERIAL CULTURE, BONE CULTURE AEROBIC BACTERIAL Armen Jefferson DPM 7/19/2019  2:55 PM    A : RIGHT FIRST TOE Tissue Toe SURGICAL PATHOLOGY EXAM Armen Jefferson DPM 7/19/2019  2:55 PM      Findings:   None.  Complications: None.  Implants:  * No implants in log *     All infection should be gone, as the entire toe was removed and tissue looked healthy at level of amputation.    Bone was harvested from deep to the ulcer and sent for aerobic and anaerobic culture.

## 2019-07-19 NOTE — OR NURSING
Capnography machine not available in the PACU. Pt is not on narcotic pain medication on floor.MDA aware

## 2019-07-20 ENCOUNTER — APPOINTMENT (OUTPATIENT)
Dept: GENERAL RADIOLOGY | Facility: CLINIC | Age: 74
DRG: 617 | End: 2019-07-20
Attending: PODIATRIST
Payer: MEDICARE

## 2019-07-20 LAB
GABAPENTIN SERPLBLD-MCNC: 5 UG/ML (ref 4–16)
GLUCOSE BLDC GLUCOMTR-MCNC: 102 MG/DL (ref 70–99)
GLUCOSE BLDC GLUCOMTR-MCNC: 104 MG/DL (ref 70–99)
GLUCOSE BLDC GLUCOMTR-MCNC: 127 MG/DL (ref 70–99)
GLUCOSE BLDC GLUCOMTR-MCNC: 161 MG/DL (ref 70–99)
GLUCOSE BLDC GLUCOMTR-MCNC: 202 MG/DL (ref 70–99)

## 2019-07-20 PROCEDURE — 25000131 ZZH RX MED GY IP 250 OP 636 PS 637: Mod: GY | Performed by: PODIATRIST

## 2019-07-20 PROCEDURE — 25800030 ZZH RX IP 258 OP 636: Performed by: PODIATRIST

## 2019-07-20 PROCEDURE — 25000132 ZZH RX MED GY IP 250 OP 250 PS 637: Mod: GY | Performed by: PODIATRIST

## 2019-07-20 PROCEDURE — 40000986 XR FOOT RT G/E 3 VW: Mod: RT

## 2019-07-20 PROCEDURE — 12000000 ZZH R&B MED SURG/OB

## 2019-07-20 PROCEDURE — 00000146 ZZHCL STATISTIC GLUCOSE BY METER IP

## 2019-07-20 PROCEDURE — 25000132 ZZH RX MED GY IP 250 OP 250 PS 637: Mod: GY | Performed by: STUDENT IN AN ORGANIZED HEALTH CARE EDUCATION/TRAINING PROGRAM

## 2019-07-20 PROCEDURE — 99232 SBSQ HOSP IP/OBS MODERATE 35: CPT | Performed by: INTERNAL MEDICINE

## 2019-07-20 RX ADMIN — SODIUM CHLORIDE, PRESERVATIVE FREE: 5 INJECTION INTRAVENOUS at 10:11

## 2019-07-20 RX ADMIN — AMLODIPINE BESYLATE 10 MG: 10 TABLET ORAL at 10:08

## 2019-07-20 RX ADMIN — ASPIRIN 81 MG: 81 TABLET, COATED ORAL at 10:08

## 2019-07-20 RX ADMIN — LEVETIRACETAM 250 MG: 250 TABLET, FILM COATED ORAL at 10:07

## 2019-07-20 RX ADMIN — PRAVASTATIN SODIUM 10 MG: 10 TABLET ORAL at 10:07

## 2019-07-20 RX ADMIN — INSULIN ASPART 1 UNITS: 100 INJECTION, SOLUTION INTRAVENOUS; SUBCUTANEOUS at 16:36

## 2019-07-20 RX ADMIN — LEVETIRACETAM 250 MG: 250 TABLET, FILM COATED ORAL at 22:11

## 2019-07-20 RX ADMIN — LEVOTHYROXINE SODIUM 50 MCG: 50 TABLET ORAL at 10:08

## 2019-07-20 RX ADMIN — TACROLIMUS 2 MG: 1 CAPSULE ORAL at 10:06

## 2019-07-20 RX ADMIN — CEPHALEXIN 500 MG: 500 CAPSULE ORAL at 10:04

## 2019-07-20 RX ADMIN — Medication 150 MG: at 10:08

## 2019-07-20 RX ADMIN — CEPHALEXIN 500 MG: 500 CAPSULE ORAL at 22:12

## 2019-07-20 RX ADMIN — TACROLIMUS 2 MG: 1 CAPSULE ORAL at 22:11

## 2019-07-20 RX ADMIN — PREDNISONE 5 MG: 5 TABLET ORAL at 10:07

## 2019-07-20 RX ADMIN — FAMOTIDINE 20 MG: 20 TABLET, FILM COATED ORAL at 10:08

## 2019-07-20 RX ADMIN — INSULIN GLARGINE 10 UNITS: 100 INJECTION, SOLUTION SUBCUTANEOUS at 22:11

## 2019-07-20 RX ADMIN — Medication 150 MG: at 22:11

## 2019-07-20 ASSESSMENT — ACTIVITIES OF DAILY LIVING (ADL)
ADLS_ACUITY_SCORE: 27
ADLS_ACUITY_SCORE: 29
ADLS_ACUITY_SCORE: 27
ADLS_ACUITY_SCORE: 29
ADLS_ACUITY_SCORE: 27
ADLS_ACUITY_SCORE: 27

## 2019-07-20 NOTE — PLAN OF CARE
DATE & TIME: 7/20/2019 7-3pm      Cognitive Concerns/ Orientation : A & O x4, forgetful.  BEHAVIOR & AGGRESSION TOOL COLOR: Green  CIWA SCORE: N/A  ABNL VS/O2: VSS on RA  MOBILITY: Total care/A2. Up w/ a lift  PAIN MANAGMENT: C/O of L wrist discomfort w/ IV. Mild discomfort in R foot, pt declined intervention  DIET: Mod carb  BOWEL/BLADDER: Johnson patent, no BM  ABNL LAB/BG: /102  DRAIN/DEVICES: PIV infusing NS @ 100 mL/hr  TELEMETRY RHYTHM: N/A  SKIN: Pale, significant bruising throughout. Excoriation in groin, Intra dry placed. R foot dressing CDI. Coccyx and bilateral posterior thighs blanchable redness with excoriation.  TESTS/PROCEDURES: None  D/C DAY/GOALS/PLACE: Pending?  OTHER IMPORTANT INFO: Podiatry signed off, follow up in clinic.  and family present.

## 2019-07-20 NOTE — PLAN OF CARE
Cognitive Concerns/ Orientation : A&Ox2-3, forgetful at times. Able to hold a good conversation, makes needs known. Talked with friend at length this evening.    BEHAVIOR & AGGRESSION TOOL COLOR: Green, VPM for safety.   CIWA SCORE: NA    ABNL VS/O2: VSS w/BP in the 140's. On RA. CPaP at night.   MOBILITY: Lift, turned Q2h. R leg elevated post R toe amputation.   PAIN MANAGMENT: Tramadol x1 for R hip and toe pain d/t positioning on riser foam block. Pillows used this evening for more comfort.   DIET: Mod-Carb. Small appetite this evening. Good oral intake.   BOWEL/BLADDER: Chronic rico collecting light yellow urine. No BM this evening.   ABNL LAB/BG: Crea 1.53, Cultures from toe amp pending, , 169.   DRAIN/DEVICES: Rico, PIV in L wrist infusing NS at 100mL/hr.   TELEMETRY RHYTHM: NA   SKIN: Obese, moist in skin folds. Cleansed, dried, and powder used. Bottom excoriated. Cleansed, dried, and barrier cream applied.   TESTS/PROCEDURES: R Toe amp completed this afternoon. Cultures pending.   D/C DAY/GOALS/PLACE: 1-2 days.   OTHER IMPORTANT INFO: Contact precautions maintained. Rounded on freq. Bed alarm and VPM for safety. Pt calls as needed. Using home Cpap.

## 2019-07-20 NOTE — PROGRESS NOTES
[facility]    Reports more pain associated with IV site than her right foot.    Exam:  B/P: 144/63, T: 98.2, P: 68, R: 16  Right hallux is stable.  Incision remains coapted  No dehiscence or ischemic changes  No erythema and minimal edema.   Bone cultures pending    Assessment:  73-year old female with type 2 diabetes, peripheral neuropathy status post amputation of the right hallux for treatment of a necrotic wound and osteomyelitis. POD #1. Stable. No clinical signs of infection at surgical site    Plan:  Will obtain new baseline, post op XR  Sterile redress of right foot  Discharge antibiotics per ID. Keflex  I will monitor the bone cultures as an outpatient, yet I am confident that all infection was removed.   Foot-relevant discharge orders will be placed  Podiatry will sign off at this time. Please page me with questions or concerns.      Thank you for involving us in her care.    Armen Jefferson, JAYLEEN, FACFAS, MS  [facility]/Foot & Ankle [facility]  [phone]

## 2019-07-20 NOTE — PLAN OF CARE
DATE & TIME: 7/19/19 Nightshift      Cognitive Concerns/ Orientation: A&Ox4     BEHAVIOR & AGGRESSION TOOL COLOR: Green   CIWA SCORE: NA  ABNL VS/O2: VSS on RA/ CPAP, /82  MOBILITY: T&R Q2  PAIN MANAGMENT: Reports pain in left hand but declines medication.  DIET: Mod carb.  BOWEL/BLADDER: Chronic Johnson, incont of stool- no BM this shift.   ABNL LAB/BG: Creat 1.53; BG was 127.  DRAIN/DEVICES: PIV NS at 100; Chronic Johnson  TELEMETRY RHYTHM: NA  SKIN: Redness on buttocks and posterior thighs, dressing on rt. Foot great toe amputation  TESTS/PROCEDURES: NA  D/C DAY/GOALS/PLACE: 2-3 days back home with home care.   OTHER IMPORTANT INFO: VPM discontinued- pt not agitated. Contact precautions maintained. CMS intact.

## 2019-07-20 NOTE — PROGRESS NOTES
[facility]    Medicine Progress Note - Hospitalist Service       Date of Admission:  7/15/2019  Date of Service: 07/20/2019    Assessment & Plan        Gem Jade is a 73 year old female who presents with decreased level of alertness.     Acute encephalopathy, secondary to catheter related urinary tract infection  Chronic indwelling Johnson  Assessment: History of frequent urinary tract infections associated with chronic indwelling Johnson catheter.  Most recent urine cultures 4/24/2019 and 5/10/2019 demonstrated greater than 100,000 colony-forming units of Citrobacter.  Has a history of VRE, MRSA, and ESBL. Note that patient has followed with infectious disease clinic in the past related to recurrent urinary tract infections with colonization. AMS is likely secondary to recurrent UTI, supra therapeutic Keppra also likely contributing. Neuro feels less likely seizure. UC on admission now with MSSA  Plan:  - indwelling Johnson catheter for retention  - BC with Corynebacterium striatum (contaminant likely per ID)  - IV Invanz -> PO Keflex 07/17 till 7/29  - IVF  - ID signing off  - Follow vitals/temp     Right Great Toe Redness/Swelling  Osteomyelitis  Assessment: have concern about deep infection in addition to likely cellulitis. MRI confirms likely osteomyelitis. amputation done 07/19. EKG on admission with SR, medically cleared for surgery.   Plan:  - Podiatry managing post op care  - amputation on the right great toe one done 7-19  - Abx per ID, currently on Keflex for chronic osteomyelits and UTI due for an other 10 days per ID note   - probable discharge tomorrow if cleared by podiatry    Obstructive sleep apnea on CPAP:   -CPAP as per home settings      Type 2 diabetes with retinopathy, nephropathy, neuropathy including diabetic gastroparesis   Assessment: most recent A1c 6.2 on 04/2019. PTA on Glargine 30 U at bedtime.  Plan:  - resumed post op DM plan as prior to surgery   - sliding scale  insulin as needed  - Hypoglycemia protocol    End-stage renal disease status post historic right lower quadrant renal transplant:   Assessment: Renal transplant in .  Uncertain if this was living or  donor. Cr on admission 1.98, elevated from baseline 1.54-1.82. Cr improving with IVF. 1.53 today  Plan:  - Tacrolimus level pending  - IVF  - Continue prednisone 5 mg daily at this time  - Continue tacrolimus  - Avoid NSAIDs/nephrotoxins     Severe obesity:   -Increased risk of all cause mortality  -Outpatient follow-up.     History of provoked bilateral PE:   -not on anticoagulation. Follow with PCP     Paroxysmal atrial fibrillation:  Underwent 48-hour Holter monitoring in May 2016 demonstrating sinus rhythm with occasional supraventricular tachycardia.  No atrial fibrillation reported on that study. In sinus rhythm on admission.  At time of event in , recognition was for discussion with primary care provider regarding risk versus benefit of anticoagulation. [id number] score of 3 for age, female sex, diabetes.  Plan:  -Defer further discussion of anticoagulation to primary care provider.    -Held also for surgery of right great tow     History of seizure disorder:   - Keppra level elevated. EEG with no epileptic activity. Neuro was consulted, recommended that given hx of seizure disorder, they recommend continuing and restart of dose reduced at 250 mg bid.    Plan:   - Hold PTA keppra for now given acute hallucinations  - Will need to discuss with  whether to resume prior to discharge. Initial discussions he would like her to be off Keppra but unclear if he knows about neurology recommendation     Hypertension:  -Continue prior to admission amlodipine 10 mg daily and Labetalol 150mg BID.   - BP on high side but post op pain. Treat pain and reassess     Cystic finding in the pancreatic body.  Incidental on extensive imaging in the emergency department.  Has been noted previously as well.   Question of intraductal papillary mucinous neoplasm.  Patient follows with Dr. Chen of gastroenterology, largely for her gastroparesis.  - follow-up with gastroenterology.      Hypothyroidism:  -Continue prior to admission levothyroxine 50 mcg daily        Diet: Consistent Carbohydrate Diet 5750-5191 Calories: Moderate Consistent CHO (4-6 CHO units/meal)    DVT Prophylaxis: Pneumatic Compression Devices  Rico Catheter: in place, indication: Retention  Code Status: Full Code      Disposition Plan   Expected discharge: tomorrow or Monday, recommended to prior living arrangement once antibiotic plan established.-\probable discharge tomorrow if cleared by podiatry  Entered: Bhargav Craven MD 07/20/2019, 4:02 PM       The patient's care was discussed with the Bedside Nurse, Patient and Patient's Family.    Bhargav Craven MD  Hospitalist Service  [facility]    ______________________________________________________________________    Interval History   Post op pain controlled. Pain at the site of PIV  No CP/SOB. No fevers since admission.   No nausea/vomiting  No new complaints from the patient    Data reviewed today: I reviewed all medications, new labs and imaging results over the last 24 hours. I personally reviewed no images or EKG's today.    Physical Exam   Vital Signs: Temp: 97.9  F (36.6  C) Temp src: Oral BP: 155/56 Pulse: 68 Heart Rate: 67 Resp: 16 SpO2: 98 % O2 Device: None (Room air)    Weight: 229 lbs .93 oz    GENERAL APPEARANCE:  alert and no distress  RESP: CTABL  CV: RRR with no m/r/g  ABDOMEN: obese otherwise soft, nontender, non-distended  : rico catheter present  NEURO: alert and oriented to person, place and time. Moving all extremities.   MS: Right foot wrapped in surgical dressing  SKIN: no suspicious lesions or rashes    Data   Recent Labs   Lab 07/19/19  0832 07/18/19  1137 07/17/19  0755 07/16/19  0848 07/15/19  1546   WBC 7.3  --   --  8.3 8.4   HGB 10.5*   --   --  10.3* 11.4*   MCV 89  --   --  92 93   *  --   --  150 200    142 144 142 141   POTASSIUM 4.1 4.2 4.1 4.1 4.8   CHLORIDE 116* 117* 116* 115* 109   CO2 21 20 22 19* 23   BUN 31* 29 38* 46* 53*   CR 1.53* 1.55* 1.76* 1.81* 1.98*   ANIONGAP 5 5 6 8 9   ROGER 8.5 8.9 8.7 8.4* 9.3   * 157* 169* 71 192*   ALBUMIN 2.6*  --   --   --  3.1*   PROTTOTAL 7.0  --   --   --  8.5   BILITOTAL 0.3  --   --   --  0.3   ALKPHOS 90  --   --   --  113   ALT 11  --   --   --  18   AST 10  --   --   --  16     Recent Results (from the past 24 hour(s))   XR Foot Right G/E 3 Views    Narrative    RIGHT FOOT THREE OR MORE VIEWS  7/20/2019 1:19 PM     HISTORY:  Postop; new baseline status post hallux amputation.    COMPARISON: 7/17/2019 MRI.    FINDINGS: Interval recent postoperative changes great toe amputation.  Diffuse soft tissue swelling. Advanced osteoporosis. No evidence for  other acute osseous pathology. Multiple hammertoe deformities.  Degenerative changes in the midfoot and hindfoot. No acute fractures  or bone destruction evident.       Impression    IMPRESSION: Great toe amputation.     SHUN WATSON MD     Medications     sodium chloride 100 mL/hr at 07/20/19 1011       amLODIPine  10 mg Oral Daily     aspirin  81 mg Oral Daily     cephALEXin  500 mg Oral Q12H     famotidine  20 mg Oral Daily     insulin aspart  1-3 Units Subcutaneous TID AC     insulin aspart  1-3 Units Subcutaneous At Bedtime     insulin glargine  10 Units Subcutaneous At Bedtime     labetalol  150 mg Oral BID     levETIRAcetam  250 mg Oral BID     levothyroxine  50 mcg Oral Daily     pravastatin  10 mg Oral Daily     predniSONE  5 mg Oral Daily     sodium chloride (PF)  3 mL Intracatheter Q8H     tacrolimus  2 mg Oral BID

## 2019-07-21 ENCOUNTER — NURSE TRIAGE (OUTPATIENT)
Dept: NURSING | Facility: CLINIC | Age: 74
End: 2019-07-21

## 2019-07-21 ENCOUNTER — TELEPHONE (OUTPATIENT)
Dept: FAMILY MEDICINE | Facility: CLINIC | Age: 74
End: 2019-07-21

## 2019-07-21 VITALS
OXYGEN SATURATION: 99 % | WEIGHT: 237.88 LBS | SYSTOLIC BLOOD PRESSURE: 149 MMHG | HEART RATE: 68 BPM | DIASTOLIC BLOOD PRESSURE: 64 MMHG | BODY MASS INDEX: 37.26 KG/M2 | TEMPERATURE: 98 F | RESPIRATION RATE: 18 BRPM

## 2019-07-21 DIAGNOSIS — E11.42 DIABETIC POLYNEUROPATHY ASSOCIATED WITH TYPE 2 DIABETES MELLITUS (H): ICD-10-CM

## 2019-07-21 LAB
BACTERIA SPEC CULT: NO GROWTH
GLUCOSE BLDC GLUCOMTR-MCNC: 167 MG/DL (ref 70–99)
GLUCOSE BLDC GLUCOMTR-MCNC: 175 MG/DL (ref 70–99)
Lab: NORMAL
SPECIMEN SOURCE: NORMAL

## 2019-07-21 PROCEDURE — 25000131 ZZH RX MED GY IP 250 OP 636 PS 637: Mod: GY | Performed by: PODIATRIST

## 2019-07-21 PROCEDURE — 25000132 ZZH RX MED GY IP 250 OP 250 PS 637: Mod: GY | Performed by: PODIATRIST

## 2019-07-21 PROCEDURE — 25000132 ZZH RX MED GY IP 250 OP 250 PS 637: Mod: GY | Performed by: STUDENT IN AN ORGANIZED HEALTH CARE EDUCATION/TRAINING PROGRAM

## 2019-07-21 PROCEDURE — 99239 HOSP IP/OBS DSCHRG MGMT >30: CPT | Performed by: INTERNAL MEDICINE

## 2019-07-21 PROCEDURE — 00000146 ZZHCL STATISTIC GLUCOSE BY METER IP

## 2019-07-21 RX ORDER — CEPHALEXIN 500 MG/1
500 CAPSULE ORAL EVERY 12 HOURS
Qty: 20 CAPSULE | Refills: 0 | Status: SHIPPED | OUTPATIENT
Start: 2019-07-21 | End: 2019-07-31

## 2019-07-21 RX ORDER — LEVETIRACETAM 250 MG/1
250 TABLET ORAL 2 TIMES DAILY
Qty: 60 TABLET | Refills: 0 | Status: ON HOLD | OUTPATIENT
Start: 2019-07-21 | End: 2019-08-09

## 2019-07-21 RX ADMIN — TACROLIMUS 2 MG: 1 CAPSULE ORAL at 10:08

## 2019-07-21 RX ADMIN — LEVOTHYROXINE SODIUM 50 MCG: 50 TABLET ORAL at 10:09

## 2019-07-21 RX ADMIN — Medication 150 MG: at 10:09

## 2019-07-21 RX ADMIN — PREDNISONE 5 MG: 5 TABLET ORAL at 10:09

## 2019-07-21 RX ADMIN — CEPHALEXIN 500 MG: 500 CAPSULE ORAL at 10:08

## 2019-07-21 RX ADMIN — PRAVASTATIN SODIUM 10 MG: 10 TABLET ORAL at 10:09

## 2019-07-21 RX ADMIN — FAMOTIDINE 20 MG: 20 TABLET, FILM COATED ORAL at 10:09

## 2019-07-21 RX ADMIN — LEVETIRACETAM 250 MG: 250 TABLET, FILM COATED ORAL at 10:09

## 2019-07-21 RX ADMIN — ASPIRIN 81 MG: 81 TABLET, COATED ORAL at 10:10

## 2019-07-21 RX ADMIN — AMLODIPINE BESYLATE 10 MG: 10 TABLET ORAL at 10:09

## 2019-07-21 ASSESSMENT — ACTIVITIES OF DAILY LIVING (ADL)
ADLS_ACUITY_SCORE: 27

## 2019-07-21 NOTE — TELEPHONE ENCOUNTER
Clinic Action Needed: Yes. Please call patient.     Reason for Call: Conchita SAPP (Care Coordinator) calling from [facility] reporting patient is being discharged from the hospital today to home. States patient is homebound. Requesting patient's home care resumed by Tuesday 7/23/2019 the latest.     States she has informed Home Care patient is being discharged as well.     Routed to: [facility] Nurse Pool.    Dameon Holman RN  [facility]

## 2019-07-21 NOTE — TELEPHONE ENCOUNTER
Clinic Action Needed: Yes. Please call patient.     Reason for Call: Conchita SAPP (Care Coordinator) calling from [facility] reporting patient is being discharged from the hospital today to home. States patient is homebound. Requesting patient's home care resumed by Tuesday 7/23/2019 the latest.     States she has informed Home Care patient is being discharged as well.     Routed to: [facility] Nurse Pool.    Dameon Holman RN  [facility]

## 2019-07-21 NOTE — PROGRESS NOTES
Discharge    Patient discharged to home via stretcher with [facility].    VSS on RA. Discharging w/ rico. AM medications given. Refused breakfast. . Called  (caregiver) and went over discharge instructions and medications, also answered questions he had. New Keppra prescription was sent via  to the pt's home.     Listed belongings gathered and returned to patient. Yes  Care Plan and Patient education resolved: Yes  Prescriptions if needed, hard copies sent with patient  NA  Home and hospital acquired medications returned to patient: NA  Medication Bin checked and emptied on discharge Yes  Follow up appointment made for patient: No

## 2019-07-21 NOTE — DISCHARGE SUMMARY
[facility]  Discharge Summary        Gem Jade MRN# [id number]   YOB: 1945 Age: 73 year old     Date of Admission:  7/15/2019  Date of Discharge:  7/21/2019  Admitting Physician:  Guero Jacques MD  Discharge Physician: Janki Marin MD  Discharging Service: Hospitalist     Primary Provider: Marivel Barcenas  Primary Care Physician Phone Number: [phone]         Discharge Diagnoses/Problem Oriented Hospital Course (Providers):    Gem Jade was admitted on 7/15/2019 by Guero Jacques MD and I would refer you to their history and physical.  The following problems were addressed during her hospitalization:  Assessment & Plan           Gem Jade is a 73 year old female who presents with decreased level of alertness.     Acute encephalopathy, secondary to catheter related urinary tract infection  Chronic indwelling Johnson  Assessment: History of frequent urinary tract infections associated with chronic indwelling Johnson catheter.  Most recent urine cultures 4/24/2019 and 5/10/2019 demonstrated greater than 100,000 colony-forming units of Citrobacter.  Has a history of VRE, MRSA, and ESBL. Note that patient has followed with infectious disease clinic in the past related to recurrent urinary tract infections with colonization. AMS is likely secondary to recurrent UTI, supra therapeutic Keppra also likely contributing. Neuro feels less likely seizure. UC on admission now with MSSA  Plan:  - indwelling Johnson catheter for retention  - BC with Corynebacterium striatum (contaminant likely per ID)  - IV Invanz -> PO Keflex 07/17 till 7/29  - IVF  - ID signed off  Bone culture grew staph aureus light growth. Discussed with  today with Podiatry he feels like most of the infection sugically removed. He will see her in the clinic for close follow up   Ok to discharge with oral keflex for 10more days as per ID plan     Right Great Toe  Redness/Swelling  Osteomyelitis  Assessment: have concern about deep infection in addition to likely cellulitis. MRI confirms likely osteomyelitis. amputation done . EKG on admission with SR, medically cleared for surgery.   Plan:  - Podiatry managing post op care  - amputation on the right great toe one done   - Abx per ID, currently on Keflex for chronic osteomyelits and UTI due for an other 10 days per ID note   -     Obstructive sleep apnea on CPAP:   -CPAP as per home settings      Type 2 diabetes with retinopathy, nephropathy, neuropathy including diabetic gastroparesis   Assessment: most recent A1c 6.2 on 2019. PTA on Glargine 30 U at bedtime.  Plan:  - resumed post op DM plan as prior to surgery   - sliding scale insulin as needed  - Hypoglycemia protocol     End-stage renal disease status post historic right lower quadrant renal transplant:   ROB on CKD   Assessment: Renal transplant in .  Uncertain if this was living or  donor. Cr on admission 1.98, elevated from baseline 1.54-1.82. Cr improving with IVF. 1.53 today  Plan:  - Tacrolimus level pending  - IVF  - Continue prednisone 5 mg daily at this time  - Continue tacrolimus  - Avoid NSAIDs/nephrotoxins     Severe obesity:   -Increased risk of all cause mortality  -Outpatient follow-up.     History of provoked bilateral PE:   -not on anticoagulation. Follow with PCP     Paroxysmal atrial fibrillation:  Underwent 48-hour Holter monitoring in May 2016 demonstrating sinus rhythm with occasional supraventricular tachycardia.  No atrial fibrillation reported on that study. In sinus rhythm on admission.  At time of event in , recognition was for discussion with primary care provider regarding risk versus benefit of anticoagulation. [id number] score of 3 for age, female sex, diabetes.  Plan:  -Defer further discussion of anticoagulation to primary care provider.         History of seizure disorder:   - Keppra level elevated. EEG with  no epileptic activity. Neuro was consulted, recommended that given hx of seizure disorder, they recommend continuing and restart of dose reduced at 250 mg bid.    Plan:   - Hold PTA keppra for now given acute hallucinations  - Will need to discuss with  whether to resume prior to discharge. Initial discussions he would like her to be off Keppra but unclear if he knows about neurology recommendation     Hypertension:  -Continue prior to admission amlodipine 10 mg daily and Labetalol 150mg BID.   - BP on high side but post op pain. Treat pain and reassess     Cystic finding in the pancreatic body.  Incidental on extensive imaging in the emergency department.  Has been noted previously as well.  Question of intraductal papillary mucinous neoplasm.  Patient follows with Dr. Chen of gastroenterology, largely for her gastroparesis.  - follow-up with gastroenterology.      Hypothyroidism:  -Continue prior to admission levothyroxine 50 mcg daily         Diet: Consistent Carbohydrate Diet 2453-8059 Calories: Moderate Consistent CHO (4-6 CHO units/meal)    DVT Prophylaxis: Pneumatic Compression Devices  Johnson Catheter: in place, indication: Retention  Code Status: Full Code          Disposition Plan     Expected discharge; home today with          Code Status:      Full Code        Brief Hospital Stay Summary Sent Home With Patient in AVS:        Reason for your hospital stay      Uti and foot infection                 Important Results:      See below         Pending Results:        Unresulted Labs Ordered in the Past 30 Days of this Admission     Date and Time Order Name Status Description    7/19/2019 1455 Bone Culture Aerobic Bacterial Preliminary     7/19/2019 1455 Anaerobic bacterial culture Preliminary     7/19/2019 1455 Surgical pathology exam In process             Discharge Instructions and Follow-Up:      Follow-up Appointments     Follow Up      Follow up with Dr. Jefferson in 1-2 weeks.    Thank you for  choosing [facility]!    DR. ALDANA'S CLINIC LOCATIONS    MONDAY - [place] WEDNESDAY (AM ONLY) - [place]  [address] [place] [address] [place] [address]  [phone] / [phone] / [phone]     THURSDAY - [place] SCHEDULE SURGERY: [phone]  [address] APPOINTMENTS: [phone]  [place] [address] BILLING QUESTIONS: [phone] / [phone]         Follow-up and recommended labs and tests       Follow up with primary care provider, Marivel Barcenas NP,   within 7 days for hospital follow- up.  The following labs/tests are   recommended: recheck BMP in 1week .               Discharge Disposition:      Discharged to home        Discharge Medications:        Discharge Medication List as of 7/21/2019 10:32 AM      START taking these medications    Details   cephALEXin (KEFLEX) 500 MG capsule Take 1 capsule (500 mg) by mouth every 12 hours for 10 days, Disp-20 capsule, R-0, E-Prescribe         CONTINUE these medications which have NOT CHANGED    Details   ACETAMINOPHEN PO Take 325-650 mg by mouth every 4 hours as needed., Historical      amLODIPine (NORVASC) 5 MG tablet Take 2 tablets (10 mg) by mouth daily, Disp-60 tablet, R-3, E-Prescribe      aspirin 81 MG tablet Take 81 mg by mouth daily, Historical      baclofen (LIORESAL) 10 MG tablet Take 1 tablet (10 mg) by mouth 2 times daily as needed for other (Bladder Spasms), Disp-60 tablet, R-3, E-Prescribe      Cranberry 400 MG TABS Take 400 mg by mouth 2 times daily, Historical      cyanocobalamin (CYANOCOBALAMIN) 1000 MCG/ML injection Inject 1 mL (1,000 mcg) into the muscle every 2 months, Disp-3 mL, R-3, E-PrescribeHold for refills, dose update      diphenoxylate-atropine (LOMOTIL) 2.5-0.025 MG tablet Take 1-2 tablets by mouth 4 times daily as needed for diarrhea, Disp-360 tablet, R-0, Local Print      famotidine (PEPCID) 20 MG tablet  Take 1 tablet (20 mg) by mouth 2 times daily, Disp-60 tablet, R-1, Historical      !! gabapentin (NEURONTIN) 300 MG capsule Take 600 mg by mouth daily, Historical      !! gabapentin (NEURONTIN) 300 MG capsule Take 900 mg by mouth every evening, Historical      insulin aspart (NOVOLOG FLEXPEN) 100 UNIT/ML pen Take 10 units with meals + sliding scale if eating. Sliding scale only at bedtime when not eating. Max 40 units per day  Sliding scale:   140-199 2u  200-249 4u  250-299 6u  300-349 8u  350-399 10u  Over 400 12u, Disp-45 mL, R-3, Historical      insulin glargine (LANTUS SOLOSTAR PEN) 100 UNIT/ML pen Inject 30 Units Subcutaneous At Bedtime, Disp-45 mL, R-3, Historical      labetalol (NORMODYNE) 300 MG tablet Take 0.5 tablets (150 mg) by mouth 2 times daily, Disp-60 tablet, R-1, Historical      Lactobacillus Rhamnosus, GG, (CULTURELL) capsule Take 1 capsule by mouth 2 times daily, Disp-60 capsule, R-11, Local Print      levothyroxine (SYNTHROID/LEVOTHROID) 50 MCG tablet TAKE ONE TABLET BY MOUTH EVERY DAY, Disp-90 tablet, R-1, E-Prescribe      loratadine (CLARITIN) 10 MG tablet Take 1 tablet (10 mg) by mouth daily, Disp-90 tablet, R-3, E-Prescribe      ondansetron (ZOFRAN ODT) 4 MG ODT tab Take 1 tablet (4 mg) by mouth daily as needed for nausea 30 minutes before getting up in your wheelchair, Disp-20 tablet, R-1, E-Prescribe      pravastatin (PRAVACHOL) 10 MG tablet Take 1 tablet (10 mg) by mouth daily, Disp-90 tablet, R-3, E-Prescribe      prazosin (MINIPRESS) 1 MG capsule Take 2 capsules (2 mg) by mouth At Bedtime, Disp-60 capsule, R-1, E-Prescribe      predniSONE (DELTASONE) 5 MG tablet Take 1 tablet (5 mg) by mouth daily, Disp-90 tablet, R-3, E-Prescribe      simethicone (MYLICON) 125 MG CHEW Take 1 tablet (125 mg) by mouth as needed for intestinal gas, Disp-120 tablet, Historical      tacrolimus (GENERIC EQUIVALENT) 1 MG capsule Take 2 capsules (2 mg) by mouth 2 times daily, Disp-120 capsule, R-11,  "E-Prescribe      VITAMIN D, CHOLECALCIFEROL, PO Take 4,000 Units by mouth 2 times daily, Historical      blood glucose (ACCU-CHEK COMPACT PLUS) test strip USE TO TEST BLOOD SUGAR THREE TIMES A DAY, Disp-102 each, R-11, E-Prescribe      blood glucose monitoring (NO BRAND SPECIFIED) meter device kit Use to test blood sugar three times daily or as directed.Disp-1 kit, [id number] per insurance      COMFORT EZ PEN NEEDLES 31G X 6 MM miscellaneous USE 4 DAILY OR AS DIRECTED, Disp-100 each, R-3, E-Prescribe      lidocaine (XYLOCAINE) 2 % topical gel Apply topically as needed for moderate pain 10 ML every 3 weeksDisp-30 mL, [id number]      menthol-zinc oxide (CALMOSEPTINE) 0.44-20.625 % OINT ointment Apply topically 4 times daily as needed for skin protectionHistorical      !! order for DME Equipment being ordered: Tegaderm 4x4   MICHELLE 6 months  Sacral woundDisp-1 Box, R-3, Local Print      !! order for DME Equipment being ordered: [email or web address] Iman gel-T cushion 20 inches wide & 18 inches deep    height 1.676 m (5' 6\"), weight 101.6 kg (223 lb 15.8 oz)Disp-1 each, R-0, Local Print      levETIRAcetam (KEPPRA) 750 MG tablet Take 1 tablet (750 mg) by mouth 2 times daily, Disp-60 tablet, R-2, E-Prescribe       !! - Potential duplicate medications found. Please discuss with provider.            Allergies:         Allergies   Allergen Reactions     Atorvastatin Calcium      myalgias, muscle aches     Codeine Nausea and Vomiting     Darvocet [Propoxyphene N-Apap] Nausea and Vomiting     Hydromorphone      Levofloxacin Other (See Comments) and Diarrhea     hallucinations     Morphine      Nausea and vomiting     Niaspan [Niacin] GI Disturbance     Flushing even at low dose, GI upset     Percocet [Oxycodone-Acetaminophen]      Vomiting after taking med couple of times     Vicodin [Hydrocodone-Acetaminophen] Nausea and Vomiting           Consultations This Hospital Stay:      Consultation during this admission received " from infectious disease and podiatry         Condition and Physical on Discharge:      Discharge condition: Stable   Vitals: Blood pressure 149/64, pulse 68, temperature 98  F (36.7  C), temperature source Oral, resp. rate 18, weight 107.9 kg (237 lb 14 oz), SpO2 99 %, not currently breastfeeding.     Constitutional: alerta nd awake    Lungs: CTA   Cardiovascular: RRR   Abdomen: Soft, NT, ND, BS+   Skin: Warm and dry    Other:          Discharge Time:      Greater than 30 minutes.        Image Results From This Hospital Stay (For Non-EPIC Providers):        Results for orders placed or performed during the hospital encounter of 07/15/19   Head CT w/o contrast    Narrative    CT SCAN OF THE HEAD WITHOUT CONTRAST   7/15/2019 4:09 PM     HISTORY: Altered mental status (LOC), unexplained    TECHNIQUE:  Axial images of the head and coronal reformations without  IV contrast material. Radiation dose for this scan was reduced using  automated exposure control, adjustment of the mA and/or kV according  to patient size, or iterative reconstruction technique.    COMPARISON: Scan dated 6/2/2019.    FINDINGS:     Intracranial contents: There is diffuse parenchymal volume loss.   White matter changes are present in the cerebral hemispheres that are  consistent with small vessel ischemic disease in this age patient.  There is no evidence of intracranial hemorrhage, mass, acute infarct  or anomaly.    Visualized orbits/sinuses/mastoids:  The visualized portions of the  sinuses and mastoids appear normal.    Osseous structures/soft tissues:  There is no evidence of trauma.      Impression    IMPRESSION: No acute pathology, no bleed, mass, or areas of acute  infarction are identified. No significant change.      NAYANA GONSALES MD   Chest XR,  PA & LAT    Narrative    XR CHEST 2 VW 7/15/2019 4:17 PM    HISTORY: Altered mental status.    COMPARISON: 3/1/2019, 9/15/2018    FINDINGS: Chronic elevation of the right hemidiaphragm. No  airspace  consolidation, effusion or pneumothorax. Stable heart size.      Impression    IMPRESSION: No acute abnormality.    CÉSAR NORTON MD   XR Toe Right G/E 2 Views    Narrative    THREE VIEWS RIGHT GREAT TOE July 17, 2019 2:16 PM    HISTORY: Swelling and tenderness of the great toe.    COMPARISON: None.    FINDINGS: No acute fracture or osseous lesion is seen. There is  prominent hallux valgus and moderate if not marked degenerative change  of the first metatarsophalangeal joint. There are moderate  degenerative changes of the IP joint of the great toe. There are  additional degenerative changes and hammertoe deformities of the  remaining visualized toes. There is calcification in the vascular  structures. No other abnormality is seen.      Impression    IMPRESSION: Degenerative changes with no acute abnormality seen.     JESS ESCOBEDO MD   MR Foot Right w/o Contrast    Narrative    MR FOOT RIGHT WITHOUT CONTRAST   7/17/2019 9:27 PM     HISTORY:  Osteomyelitis suspected, foot swelling, diabetic; necrotic  wound, dorsal right hallux; wound probes to bone and into the  interphalangeal joint.    TECHNIQUE:  Sagittal, long axis, and short axis T1 and inversion  recovery pulse sequences.    COMPARISON: 7/17/2019    FINDINGS:  There appears to be ill-defined marrow edema in the distal  third of the great toe proximal phalanx. There may be a small erosion  along the dorsal aspect of the head. Mild marrow edema is also noted  within the distal phalangeal base. No IP joint fluid is visible. No  metatarsophalangeal joint effusion. Forefoot soft tissue edema is  noted, greatest along the dorsum of the forefoot. No discrete fluid  collection is visible. Marrow signal within remainder of the forefoot  appears essentially within normal limits.      Impression    IMPRESSION:  1. Great toe proximal phalangeal head small cyst versus erosion and  adjacent marrow edema. This is highly suspicious for  osteomyelitis,  particularly if there is an adjacent wound or draining sinus.  2. Great toe distal phalangeal base mild marrow edema which could be  reactive or represent an additional site of osteomyelitis.  3. Soft tissue edema in the forefoot, greatest dorsally. MR cannot  distinguish reactive edema from cellulitis. No discrete soft tissue  fluid collection or abscess is visible.    SYLVIA LUGO MD   XR Foot Right G/E 3 Views    Narrative    RIGHT FOOT THREE OR MORE VIEWS  7/20/2019 1:19 PM     HISTORY:  Postop; new baseline status post hallux amputation.    COMPARISON: 7/17/2019 MRI.    FINDINGS: Interval recent postoperative changes great toe amputation.  Diffuse soft tissue swelling. Advanced osteoporosis. No evidence for  other acute osseous pathology. Multiple hammertoe deformities.  Degenerative changes in the midfoot and hindfoot. No acute fractures  or bone destruction evident.       Impression    IMPRESSION: Great toe amputation.     SHUN WATSON MD     *Note: Due to a large number of results and/or encounters for the requested time period, some results have not been displayed. A complete set of results can be found in Results Review.             Most Recent Lab Results In EPIC (For Non-EPIC Providers):    Most Recent 3 CBC's:  Recent Labs   Lab Test 07/19/19  0832 07/16/19  0848 07/15/19  1546   WBC 7.3 8.3 8.4   HGB 10.5* 10.3* 11.4*   MCV 89 92 93   * 150 200      Most Recent 3 BMP's:  Recent Labs   Lab Test 07/19/19  0832 07/18/19  1137 07/17/19  0755    142 144   POTASSIUM 4.1 4.2 4.1   CHLORIDE 116* 117* 116*   CO2 21 20 22   BUN 31* 29 38*   CR 1.53* 1.55* 1.76*   ANIONGAP 5 5 6   ROGER 8.5 8.9 8.7   * 157* 169*     Most Recent 3 Troponin's:  Recent Labs   Lab Test 06/02/19 2028 04/24/19  1258 03/01/19  1119  02/28/19  0018   TROPI <0.015 <0.015 <0.015   < >  --    TROPONIN  --   --   --   --  0.00    < > = values in this interval not displayed.     Most Recent 3  INR's:  Recent Labs   Lab Test 02/28/19  0035 11/06/17  1652 04/26/17  2338   INR 1.09 1.09 1.03     Most Recent 2 LFT's:  Recent Labs   Lab Test 07/19/19  0832 07/15/19  1546   AST 10 16   ALT 11 18   ALKPHOS 90 113   BILITOTAL 0.3 0.3     Most Recent Cholesterol Panel:  Recent Labs   Lab Test 12/05/16  1210   CHOL 164   LDL 64   HDL 46*   TRIG 272*     Most Recent 6 Bacteria Isolates From Any Culture (See EPIC Reports for Culture Details):  Recent Labs   Lab Test 07/19/19  1455 07/15/19  1536 07/15/19  1535 06/04/19  0738 06/02/19  2122 06/02/19  2111   CULT Light growth  Staphylococcus aureus  Susceptibility testing in progress  *  Culture negative monitoring continues Cultured on the 2nd day of incubation:  Corynebacterium striatum  Identification obtained by MALDI-TOF mass spectrometry research use only database. Test   characteristics determined and verified by the [facility]   ([facility]) [place], [place].  *  Critical Value/Significant Value, preliminary result only, called to and read back by  Paddy Breen Rn @ 4542 7.17.19 CS    (Note)  NEGATIVE for the following: Staphylococcus spp., Staph aureus, Staph  epidermidis, Staph lugdunensis, Streptococcus spp., Strep pneumoniae,  Strep pyogenes, Strep agalactiae, Strep anginosus group, Enterococcus  faecalis, Enterococcus faecium, and Listeria spp. by Akvolutionigene  multiplex nucleic acid test. Final identification and antimicrobial  susceptibility testing will be verified by standard methods.    Critical Value/Significant Value called to and read back by Paddy Breen RN @ 7792 7.17.19 CS     No growth  50,000 to 100,000 colonies/mL  Staphylococcus aureus  * No growth >100,000 colonies/mL  Citrobacter freundii complex  *  >100,000 colonies/mL  Staphylococcus aureus  * Cultured on the 1st day of incubation:  Staphylococcus capitis  This isolate DOES NOT demonstrate inducible clindamycin resistance in vitro. Clindamycin   is  susceptible and could be used when indicated, however, erythromycin is resistant and   should not be used.  *  Critical Value/Significant Value, preliminary result only, called to and read back by  Jyoti Brower RN on 06.03.2019 at 2151. JRT    (Note)  POSITIVE for Staphylococci other than S.aureus, S.epidermidis and  S.lugdunensis, by Verigene multiplex nucleic acid test.  Coagulase-negative staphylococci are the most common venipuncture or  collection associated skin CONTAMINANTS grown in blood cultures.  Final identification and antimicrobial susceptibility testing will be  verified by standard methods.    Specimen tested with Verigene multiplex, gram-positive blood culture  nucleic acid test for the following targets: Staph aureus, Staph  epidermidis, Staph lugdunensis, other Staph species, Enterococcus  faecalis, Enterococcus faecium, Streptococcus species, S. agalactiae,  S. anginosus grp., S. pneumoniae, S. pyogenes, Listeria sp., mecA  (methicillin resistance) and Viridiana/B (vancomycin resistance).    Critical Value/Significant Value called to and read back by  DENNIS RAMIREZ RN ([id number]).  06.04.19  0028 GJS       Most Recent TSH, T4 and HgbA1c:   Recent Labs   Lab Test 07/01/19  1208  04/24/19  1258   TSH 2.43   < >  --    T4 0.82  --   --    A1C  --   --  6.2*    < > = values in this interval not displayed.

## 2019-07-21 NOTE — PROGRESS NOTES
Writer did not get a chance to meet with the patient.  Patient is a High readmission risk.  Patient receives tele consults through the [facility].  Writer called RN line to notify that the patient was discharged to home with resumption of [facility] RN/HHA and asked that the PCP follow up week of 7/22.  Patients spouse is concerned about the follow up with Podiatry as patient is home bound.  PCP should be able to confirm next steps and work with Podiatry provider if necessary.

## 2019-07-21 NOTE — PLAN OF CARE
Cognitive Concerns/ Orientation : A&Ox3-4, calm and cooperative. Sleeping off and on through evening.    BEHAVIOR & AGGRESSION TOOL COLOR: Green   CIWA SCORE: NA    ABNL VS/O2: VSS with BP in the 150s. On RA.   MOBILITY: Total care, assists with turn. Lift used as needed. Turned Q2h.   PAIN MANAGMENT: Denies   DIET: Mod-carb. Fair appetite and good oral intake. IV fluids discontinued.   BOWEL/BLADDER: Chronic rico. Incont of bowel, 1 med BM this evening.   ABNL LAB/BG: Crea 1.53, , 202  DRAIN/DEVICES: IV removed per MD orders.   TELEMETRY RHYTHM: NA   SKIN: Obese, excoriated in skin folds, cleansed, dried, powdered. Barrier applied to coccyx. R foot wrapped from toe amputation yesterday.   TESTS/PROCEDURES: NA, podiatry has signed off.   D/C DAY/GOALS/PLACE: Possible early discharge. Left message with SW about transportation needs.   OTHER IMPORTANT INFO: Contact precautions maintained. Bed alarm for safety. OK for NO IV access. Rounded on freq.

## 2019-07-21 NOTE — PROGRESS NOTES
SW:    D: spoke with pt's spouse this AM he will be home when pt arrives. No steps-ramp, no key needed as spouse will be home. PCS @ 1030 via HE to home. [facility] faxed PCS form.  P: no further concerns.    RAMILA Yung

## 2019-07-21 NOTE — PLAN OF CARE
DATE & TIME: 7/20/19 Nightshift      Cognitive Concerns/ Orientation: A&Ox4     BEHAVIOR & AGGRESSION TOOL COLOR: Green   CIWA SCORE: NA  ABNL VS/O2: VSS on RA/ CPAP, /64  MOBILITY: T&R Q2  PAIN MANAGMENT: Denies.  DIET: Mod carb.  BOWEL/BLADDER: Chronic Johnson, incont of stool- no BM this shift.   ABNL LAB/BG: .  DRAIN/DEVICES: PIV NS at 100; Chronic Johnson  TELEMETRY RHYTHM: NA  SKIN: Redness on buttocks and posterior thighs, dressing on rt. foot great toe amputation  TESTS/PROCEDURES: NA  D/C DAY/GOALS/PLACE: Today or Monday, back home with home care.   OTHER IMPORTANT INFO: Contact precautions maintained. CMS intact. Podiatry has cleared for discharge.

## 2019-07-22 ENCOUNTER — PATIENT OUTREACH (OUTPATIENT)
Dept: CARE COORDINATION | Facility: CLINIC | Age: 74
End: 2019-07-22

## 2019-07-22 ENCOUNTER — TELEPHONE (OUTPATIENT)
Dept: FAMILY MEDICINE | Facility: CLINIC | Age: 74
End: 2019-07-22

## 2019-07-22 DIAGNOSIS — Z00.00 ROUTINE GENERAL MEDICAL EXAMINATION AT A HEALTH CARE FACILITY: Primary | ICD-10-CM

## 2019-07-22 DIAGNOSIS — E11.65 TYPE 2 DIABETES MELLITUS WITH HYPERGLYCEMIA, WITH LONG-TERM CURRENT USE OF INSULIN (H): Primary | ICD-10-CM

## 2019-07-22 DIAGNOSIS — Z79.4 TYPE 2 DIABETES MELLITUS WITH HYPERGLYCEMIA, WITH LONG-TERM CURRENT USE OF INSULIN (H): Primary | ICD-10-CM

## 2019-07-22 NOTE — TELEPHONE ENCOUNTER
Spoke with Gem, she is fatigued but glad to be home. Homecare called her & confirmed that they will be out to visit 7/23/19.  Marivel Klein RN

## 2019-07-22 NOTE — PROGRESS NOTES
Clinic Care Coordination Contact    Clinic Care Coordination Contact  OUTREACH    Referral Information:            Chief Complaint   Patient presents with     Clinic Care Coordination - Post Hospital     RN        [place] Utilization: frequent hospitalization     Utilization    Last refreshed: 7/22/2019  6:58 AM:  Hospital Admissions 5           Last refreshed: 7/22/2019  6:58 AM:  ED Visits 0           Last refreshed: 7/22/2019  6:58 AM:  No Show Count (past year) 5              Current as of: 7/22/2019  6:58 AM              Clinical Concerns:  Current Medical Concerns:  Discharged over the weekend when hospitalized for altered level of consciousness and right great toe amputation    Current Behavioral Concerns: none    Education Provided to patient: RN CC contact information reviewed with patient      Health Maintenance Reviewed:    Clinical Pathway: None    Medication Management:  Managed by spouse     Functional Status:   bed ridden    Living Situation:  Type of residence:: Private home - stairs    Diet/Exercise/Sleep:  Poor appetite. Unable to exercise     Transportation:   mostly homebound        Psychosocial:   no concerns     Financial/Insurance:      No concerns     Resources and Interventions:  Current Resources:    ;      Supplies used at home:: Wound Care Supplies  Equipment Currently Used at Home: lift device               Goals:     #1-Goal Statement: I will reduce the number of UTI's I experience  Measure of Success: reduction in number of UTI's  Supportive Steps to Achieve: home care nurse to request orders for more frequent catheter changes and increased pericare per week  Barriers: resistant UTI's  Strengths: assistance as above  Date to Achieve By: 12-1-19    Patient/Caregiver understanding: good       Future Appointments              In 2 weeks [facility] DANIEL DHALIWAL          Plan: referral placed to community paramedic as patient  believes that toe infection that prompted amputation was started by home care nurse removing the scab that was on the toe wound. She would like paramedic to observe home care nurse providing wound care for reassurance. Discussed with CPBreana, who agrees with plan. Referral entered. PCP updated. Follow up approximately one week    Porsha Khalil RN  [facility]-Care Coordination  [facility]  [facility]  [phone]

## 2019-07-22 NOTE — TELEPHONE ENCOUNTER
Patient needs Accu-chek Guide meter due to visual impairment. We have rx for new meter kit, but she will also need rxs for the lancets, test strips, control solution. Testing TID. Please send rx(s) to [facility] pharmacy.  Thank you,   Gely Pittman, Pharm. D.  [facility]  [phone]

## 2019-07-23 ENCOUNTER — TELEPHONE (OUTPATIENT)
Dept: FAMILY MEDICINE | Facility: CLINIC | Age: 74
End: 2019-07-23

## 2019-07-23 ENCOUNTER — TELEPHONE (OUTPATIENT)
Dept: TRANSPLANT | Facility: CLINIC | Age: 74
End: 2019-07-23

## 2019-07-23 ENCOUNTER — TELEPHONE (OUTPATIENT)
Dept: PODIATRY | Facility: CLINIC | Age: 74
End: 2019-07-23

## 2019-07-23 LAB — COPATH REPORT: NORMAL

## 2019-07-23 RX ORDER — BLOOD-GLUCOSE CONTROL, NORMAL
EACH MISCELLANEOUS
Qty: 3 BOTTLE | Refills: 3 | Status: SHIPPED | OUTPATIENT
Start: 2019-07-23 | End: 2019-01-01

## 2019-07-23 NOTE — TELEPHONE ENCOUNTER
Reason for Call:  Home Health Care    Yashira with [facility] called regarding (reason for call): orders    Orders are needed for this patient. Skilled Nursing & Home Health Aide    PT: N/A    OT: N/A    Skilled Nursinx/wk for 1 wk  3x/wk for 2 wks w/ 3 PRN visits    Home Health Aide:  2x/wk for 1 wk  3x/wk for 2 wks    Pt Provider:     Phone Number Homecare Nurse can be reached at: [phone]    Can we leave a detailed message on this number? YES    Phone number patient can be reached at: Home number on file [phone] (home)    Best Time: anytime    Call taken on 2019 at 12:10 PM by Delma Flores

## 2019-07-23 NOTE — TELEPHONE ENCOUNTER
Call returned to patient  RN Yashira. She v\u to collect patient labs during her next scheduled lab draw.

## 2019-07-23 NOTE — TELEPHONE ENCOUNTER
Yashira from [facility] called for wound care orders for patient.     Patient had right first toe amputation, DOS 7/19/19, Dr. Jefferson.     Please place orders.     Lisha Meza, ATC

## 2019-07-23 NOTE — TELEPHONE ENCOUNTER
Provider Call: Transplant Lab/Orders  Route to LPN  Post Transplant Days: 7209  When patient is less than 60 days post-transplant, route high priority  Reason for Call: Missing labs/orders; which labs/orders? BMP CBC all transplant labs (If patient is at a clinic without orders, [place] then page LPN)  Liver patients reporting abnormal lab results: Route to RN and Page  Document lab facility information when provider is calling about annual lab orders. Delete facility wildcards when not needed.    Callback needed? Yes    Return Call Needed  Same as documented in contacts section  When to return call?: Same day: Route High Priority

## 2019-07-24 NOTE — TELEPHONE ENCOUNTER
Phone call acknowledged.    Armen Jefferson DPM, FACLISA, MS    [facility] of Podiatry/Foot & Ankle Surgery

## 2019-07-24 NOTE — TELEPHONE ENCOUNTER
"Made follow up call to Yashira with Home Care, clarified orders as far as dressings are concerned. Yashira stated the dressing has not come off, she will relay information to not change or touch the dressing until patient is seen in clinic. Home Care states that the patient is unwilling to call and make an appointment, states patient refuses to go to clinic and that patient states that \"Dr Jefferson can come to me.\" Home care has told patient and  several times that the follow up apt needs to be made. Offered to schedule patient while on the phone with home care, Yashira denied the apt and said that the patient is particular in what time and where she is seen. Advised I would route our conversation to Dr Jefferson to advise.          Ky Choudhury on 7/24/2019 at 4:40 PM    "

## 2019-07-24 NOTE — TELEPHONE ENCOUNTER
"Is there a phone number for the home health care nurse? I cannot find an order in Epic for \"wound care.\"  She had a toe amputation with primary closure.  Unless the dressing put on at the hospital came off, there is no need for dressing changes.  She can keep the dressing on until follow up with me or a member of our team. This should be within 1-2 weeks of surgery.     If home health care would like to help monitor the surgical site, or the dressing needs replacin) paint incision with betadine  2) cover with sterile gauze  3) secure with wrapping lightly with cast padding and/or edwina  4) ACE wrap.     This does not have to be done every day.  It can be on days home health care visits.    Dr. Jefferson.   "

## 2019-07-25 DIAGNOSIS — E78.5 HYPERLIPIDEMIA LDL GOAL <100: ICD-10-CM

## 2019-07-25 DIAGNOSIS — E03.8 OTHER SPECIFIED HYPOTHYROIDISM: ICD-10-CM

## 2019-07-25 DIAGNOSIS — E11.42 DIABETIC POLYNEUROPATHY ASSOCIATED WITH TYPE 2 DIABETES MELLITUS (H): ICD-10-CM

## 2019-07-25 DIAGNOSIS — Z94.0 KIDNEY REPLACED BY TRANSPLANT: ICD-10-CM

## 2019-07-25 DIAGNOSIS — J00 ACUTE NASOPHARYNGITIS: ICD-10-CM

## 2019-07-25 RX ORDER — PREDNISONE 5 MG/1
5 TABLET ORAL DAILY
Qty: 90 TABLET | Refills: 3 | Status: SHIPPED | OUTPATIENT
Start: 2019-07-25 | End: 2019-01-01

## 2019-07-26 ENCOUNTER — TELEPHONE (OUTPATIENT)
Dept: PODIATRY | Facility: CLINIC | Age: 74
End: 2019-07-26

## 2019-07-26 ENCOUNTER — PATIENT OUTREACH (OUTPATIENT)
Dept: BEHAVIORAL HEALTH | Facility: CLINIC | Age: 74
End: 2019-07-26

## 2019-07-26 LAB
BACTERIA SPEC CULT: ABNORMAL
BACTERIA SPEC CULT: NORMAL
Lab: NORMAL
SPECIMEN SOURCE: ABNORMAL
SPECIMEN SOURCE: NORMAL

## 2019-07-26 RX ORDER — LEVOTHYROXINE SODIUM 50 UG/1
TABLET ORAL
Qty: 90 TABLET | Refills: 0 | Status: ON HOLD | OUTPATIENT
Start: 2019-07-26 | End: 2019-01-01

## 2019-07-26 RX ORDER — LORATADINE 10 MG/1
10 TABLET ORAL DAILY
Qty: 90 TABLET | Refills: 3 | Status: SHIPPED | OUTPATIENT
Start: 2019-07-26 | End: 2020-01-01

## 2019-07-26 RX ORDER — GABAPENTIN 300 MG/1
CAPSULE ORAL
Qty: 360 CAPSULE | Refills: 3 | OUTPATIENT
Start: 2019-07-26

## 2019-07-26 RX ORDER — PRAVASTATIN SODIUM 10 MG
10 TABLET ORAL DAILY
Qty: 90 TABLET | Refills: 3 | Status: SHIPPED | OUTPATIENT
Start: 2019-07-26 | End: 2020-01-01

## 2019-07-26 NOTE — TELEPHONE ENCOUNTER
Signed Prescriptions:                        Disp   Refills    levothyroxine (SYNTHROID/LEVOTHROID) 50 MC*90 tab*0        Sig: TAKE ONE TABLET BY MOUTH EVERY DAY  Authorizing Provider: DEIRDRE FRASER  Ordering User: ARABELLA SHELLEY    Refused Prescriptions:                       Disp   Refills    gabapentin (NEURONTIN) 300 MG capsule [Pha*360 ca*3        Sig: TAKE TWO CAPSULES BY MOUTH TWICE A DAY . MAY TAKE AN           ADDITIONAL ONE CAPSULE BY MOUTH AT BEDTIME WITH           INCREASED PAIN  Refused By: CAROL MOREJON  Reason for Refusal: OTHER  Reason for Refusal Comment: Medication was previously discontinued.      jamie - no consent - states he is very concerned because Gabapentin was denied and patient has been on medication for over a decade for neuropathy - they were expecting delivery today      Discussed PCP not in clinic and covering provider denied will need to speak with provider to discuss reasoning and resolution    IPC Reception - can we please mail consent to communicate

## 2019-07-26 NOTE — TELEPHONE ENCOUNTER
"Requested Prescriptions   Pending Prescriptions Disp Refills     pravastatin (PRAVACHOL) 10 MG tablet 90 tablet 3     Sig: Take 1 tablet (10 mg) by mouth daily         Last Written Prescription Date:  5/28/18  Last Fill Quantity: 90,   # refills: 3  Last Office Visit: 6/14/18  Future Office visit:       Routing refill request to provider for review/approval because:  Labs not current - FLP - previous CCC patient - do you want to get labs at next office visit or does she have home care that can get lab draw?      Statins Protocol Failed - 7/25/2019  3:26 PM        Failed - LDL on file in past 12 months     Recent Labs   Lab Test 12/05/16  1210   LDL 64             Passed - No abnormal creatine kinase in past 12 months     Recent Labs   Lab Test 03/09/17  1310   CKT 36                Passed - Recent (12 mo) or future (30 days) visit within the authorizing provider's specialty     Patient had office visit in the last 12 months or has a visit in the next 30 days with authorizing provider or within the authorizing provider's specialty.  See \"Patient Info\" tab in inbasket, or \"Choose Columns\" in Meds & Orders section of the refill encounter.              Passed - Medication is active on med list        Passed - Patient is age 18 or older        Passed - No active pregnancy on record        Passed - No positive pregnancy test in past 12 months        loratadine (CLARITIN) 10 MG tablet 90 tablet 3     Sig: Take 1 tablet (10 mg) by mouth daily         Last Written Prescription Date:  8/9/18  Last Fill Quantity: 90,   # refills: 3  Last Office Visit: 6/14/19  Future Office visit:       Routing refill request to provider for review/approval because:  age      Antihistamines Protocol Failed - 7/25/2019  3:26 PM        Failed - Patient is 3-64 years of age     Apply weight-based dosing for peds patients age 3 - 12 years of age.    Forward request to provider for patients under the age of 3 or over the age of 64.          Passed " "- Recent (12 mo) or future (30 days) visit within the authorizing provider's specialty     Patient had office visit in the last 12 months or has a visit in the next 30 days with authorizing provider or within the authorizing provider's specialty.  See \"Patient Info\" tab in inbasket, or \"Choose Columns\" in Meds & Orders section of the refill encounter.              Passed - Medication is active on med list      Signed Prescriptions Disp Refills    predniSONE (DELTASONE) 5 MG tablet 90 tablet 3     Sig: Take 1 tablet (5 mg) by mouth daily       There is no refill protocol information for this order          "

## 2019-07-26 NOTE — PROGRESS NOTES
I contacted the pt per request of CCRN.  The pt is concerned that the home health nurse was the reason that the wound  became infected and she needed it amputated.  I explained that [facility] was not a wound nurse and she has orders from the podiatrist for F/U  wound care.  She expressed her desire to change her home health nurse and the Community Paramedic passed along her concern to Coleen her [facility] .  I advised the pt I would F/U in a few weeks to which the pt agreed.

## 2019-07-26 NOTE — TELEPHONE ENCOUNTER
Huddle with [facility] - unable to determine plan - want to clarify with PCP - would be OK with sending one month supply and provider to review upon return      Called Patient &  report taking Gabapentin 600 mg at noon and 900 mg at midnight    Patient has enough to get through next week and is OK waiting on rx to be clarified on with PCP    [facility] - agrees with plan

## 2019-07-26 NOTE — TELEPHONE ENCOUNTER
Amy SAPP with [facility] calling, [phone]    Would like dressing change/wound orders on right first toe post amputation. Also patient reporting still moderate amount of pain, using Tylenol; patient stated would be ok with Tramadol if applicable.    FYI, patient is on bed rest so not able to come in for a visit at this time.    Please advise    Laura HENDERSON RN, BSN, PHN

## 2019-07-26 NOTE — TELEPHONE ENCOUNTER
"Requested Prescriptions   Pending Prescriptions Disp Refills     levothyroxine (SYNTHROID/LEVOTHROID) 50 MCG tablet [Pharmacy Med Name: LEVOTHYROXINE SODIUM 50MCG TABS] 90 tablet 1     Sig: TAKE ONE TABLET BY MOUTH EVERY DAY       Thyroid Protocol Passed - 7/25/2019  1:49 PM        Passed - Patient is 12 years or older        Passed - Recent (12 mo) or future (30 days) visit within the authorizing provider's specialty     Patient had office visit in the last 12 months or has a visit in the next 30 days with authorizing provider or within the authorizing provider's specialty.  See \"Patient Info\" tab in inbasket, or \"Choose Columns\" in Meds & Orders section of the refill encounter.              Passed - Medication is active on med list        Passed - Normal TSH on file in past 12 months     Recent Labs   Lab Test 07/01/19  1208   TSH 2.43              Passed - No active pregnancy on record     If patient is pregnant or has had a positive pregnancy test, please check TSH.          Passed - No positive pregnancy test in past 12 months     If patient is pregnant or has had a positive pregnancy test, please check TSH.          gabapentin (NEURONTIN) 300 MG capsule [Pharmacy Med Name: GABAPENTIN 300MG CAPS] 360 capsule 3     Sig: TAKE TWO CAPSULES BY MOUTH TWICE A DAY . MAY TAKE AN ADDITIONAL ONE CAPSULE BY MOUTH AT BEDTIME WITH INCREASED PAIN       There is no refill protocol information for this order          Problem List Complete:  Yes    Last Written Prescription Date:  7/17/18  Last Fill Quantity: 360,   # refills: 3    [facility] Office Visit with [facility]: 6/14/19    Future Office visit:     Controlled substance agreement:   Encounter-Level CSA:    There are no encounter-level csa.     Patient-Level CSA:    There are no patient-level csa.        Processing:  e-prescribe    [email or web address]   checked in past 3 months?  No, route to RN [place]  reviewed 7/26/2019 9:22 AM    Last " refill: 5/7/19  Disp: 360    Concern: Diphenoxylate-Atrop 2.5-0.025 - [facility] by [facility]

## 2019-07-26 NOTE — TELEPHONE ENCOUNTER
Voicemail left for SEKOU Reyes.    Dressing placed in hospital can stay on until follow up in clinic. There is no open wound.    Otherwise, every other day: pain incision with betadine, cover with sterile dry gauze.    Tylenol, ice, elevation for pain.      Tramadol okay.  I am not in the office today.      Armen Jefferson DPM, FACFAS, MS    [place] [facility] of Podiatry/Foot & Ankle Surgery

## 2019-07-29 ENCOUNTER — TELEPHONE (OUTPATIENT)
Dept: FAMILY MEDICINE | Facility: CLINIC | Age: 74
End: 2019-07-29

## 2019-07-29 DIAGNOSIS — G89.18 POST-OPERATIVE PAIN: Primary | ICD-10-CM

## 2019-07-29 DIAGNOSIS — Z53.9 DIAGNOSIS NOT YET DEFINED: Primary | ICD-10-CM

## 2019-07-29 PROCEDURE — G0180 MD CERTIFICATION HHA PATIENT: HCPCS | Performed by: FAMILY MEDICINE

## 2019-07-29 RX ORDER — GABAPENTIN 300 MG/1
600 CAPSULE ORAL
Qty: 70 CAPSULE | Refills: 1 | Status: SHIPPED | OUTPATIENT
Start: 2019-07-29 | End: 2019-07-31

## 2019-07-29 RX ORDER — TRAMADOL HYDROCHLORIDE 50 MG/1
TABLET ORAL
Qty: 20 TABLET | Refills: 0 | Status: ON HOLD | OUTPATIENT
Start: 2019-07-29 | End: 2019-01-01

## 2019-07-29 NOTE — TELEPHONE ENCOUNTER
"Call from pt & spouse, they have not been able to get a RX for Tramadol for post op pain following amputation of great toe. Gem feels to have a few tabs would be very helpful. Pain is no longer extreme but more of a dull \"thumping at times\"    They are also in need of a letter for medical necessity for ambulance rides home following inpt stay, specifically now for the date of 4/27/19.    Routing to PCP    Marivel Klein RN  "

## 2019-07-29 NOTE — TELEPHONE ENCOUNTER
Patient should not take over 1400mg daily due to her kidney function, I have ordered 1200mg daily with an occ extra tab for severe pain but not to be done on a daily basis.  Please let the patient and her  know    Klaudia MTZ

## 2019-07-29 NOTE — TELEPHONE ENCOUNTER
It appears Podiatry has ordered her some Tramadol for her pain and sent it to the [facility], please let her  know, I will write a letter of medical necessity     Klaudia MTZ

## 2019-07-30 ENCOUNTER — TELEPHONE (OUTPATIENT)
Dept: FAMILY MEDICINE | Facility: CLINIC | Age: 74
End: 2019-07-30

## 2019-07-30 NOTE — TELEPHONE ENCOUNTER
Faxed order to [phone].    Documents sent to abstract for scanning.    Theodore Galindo MA

## 2019-07-30 NOTE — PROGRESS NOTES
7/29/19 Gem's  stopped in clinic for a refill of tramadol. This was provided. She is bed ridden and does not leave her house.  I also spoke with her home health care RN, Amy and reviewed dressing changes, monitoring for infection and suture removal. Home health care will remove sutures 3 weeks post op.  If there are no concerns, I will likely not see the patient in follow up.     Armen Jefferson DPM, FACFAS, MS [place] Department of Podiatry/Foot & Ankle Surgery

## 2019-07-31 ENCOUNTER — OFFICE VISIT (OUTPATIENT)
Dept: FAMILY MEDICINE | Facility: CLINIC | Age: 74
End: 2019-07-31
Payer: MEDICARE

## 2019-07-31 VITALS — SYSTOLIC BLOOD PRESSURE: 138 MMHG | HEART RATE: 61 BPM | DIASTOLIC BLOOD PRESSURE: 68 MMHG | OXYGEN SATURATION: 99 %

## 2019-07-31 DIAGNOSIS — Z09 HOSPITAL DISCHARGE FOLLOW-UP: Primary | ICD-10-CM

## 2019-07-31 DIAGNOSIS — E11.65 TYPE 2 DIABETES MELLITUS WITH HYPERGLYCEMIA, WITH LONG-TERM CURRENT USE OF INSULIN (H): ICD-10-CM

## 2019-07-31 DIAGNOSIS — I10 BENIGN ESSENTIAL HYPERTENSION: ICD-10-CM

## 2019-07-31 DIAGNOSIS — F22 PARANOIA (H): ICD-10-CM

## 2019-07-31 DIAGNOSIS — G47.33 OBSTRUCTIVE SLEEP APNEA SYNDROME: ICD-10-CM

## 2019-07-31 DIAGNOSIS — Z79.4 TYPE 2 DIABETES MELLITUS WITH HYPERGLYCEMIA, WITH LONG-TERM CURRENT USE OF INSULIN (H): ICD-10-CM

## 2019-07-31 DIAGNOSIS — E11.42 DIABETIC POLYNEUROPATHY ASSOCIATED WITH TYPE 2 DIABETES MELLITUS (H): ICD-10-CM

## 2019-07-31 PROCEDURE — 99350 HOME/RES VST EST HIGH MDM 60: CPT | Performed by: NURSE PRACTITIONER

## 2019-07-31 NOTE — PROGRESS NOTES
"SUBJECTIVE:                                                    Gem Jade is a 73 year old female who presents to clinic today for the following health issues:  [facility], [place]: ***    {Superlists:797081}    Mental Health Follow up ***    Status since last visit: ***    See PHQ-9 for current symptoms.  Other associated symptoms: None    Complicating factors: ***  Significant life event:  No   Current substance abuse:  None  Anxiety or Panic symptoms:  No    Sleep - ***  Appetite - ***  Exercise - ***    Smoking - ***  Alcohol - ***  Street drugs - ***  Marijuana - ***  Caffeine - ***    PHQ-9  English PHQ-9   Any Language           {additional problems for provider to add:436441}      Problems taking medications regularly: {Med Problems:250443::\"No\"}    Medication side effects: {CHRONIC MED SIDE EFFECTS:485227::\"none\"}    Diet: { :319596}    Social History     Tobacco Use     Smoking status: Former Smoker     Years: 17.00     Last attempt to quit: 1987     Years since quittin.1     Smokeless tobacco: Never Used   Substance Use Topics     Alcohol use: No        Problem list and histories reviewed & adjusted, as indicated.  Additional history: as documented    Patient Active Problem List   Diagnosis     Kidney replaced by transplant     Primary localized osteoarthrosis, lower leg     Pulmonary embolism and infarction (H)     Diabetes mellitus with background retinopathy (H)     Hyperlipidemia LDL goal <100     Diabetic gastroparesis (H)     Back pain     Compression fx, thoracic spine (H)     Anemia     Clostridium difficile diarrhea     Hydronephrosis     Recurrent UTI     Benign essential hypertension     Vancomycin resistant Enterococcus     Clostridium difficile colitis     Diabetic polyneuropathy associated with type 2 diabetes mellitus (H)     Chronic shoulder pain, unspecified laterality     Morbid obesity, unspecified obesity type (H)     Health Care Home     Paroxysmal A-fib (H)     Acute kidney injury (H) "     Pancytopenia, acquired (H)     Obstructive sleep apnea syndrome     Complicated UTI (urinary tract infection)     Altered Mental Status, Probable Metabolic encephalopathy     Chronic indwelling Johnson catheter     Seizure disorder (H)     Dermatitis     Encephalopathy acute     Type 2 diabetes mellitus with diabetic polyneuropathy, with long-term current use of insulin (H)     Sepsis (H)     Overdose, accidental or unintentional, subsequent encounter     CKD (chronic kidney disease) stage 3, GFR 30-59 ml/min (H)     Paranoia (H)     Altered mental status     Past Surgical History:   Procedure Laterality Date     AMPUTATE TOE(S) Right 2019    Procedure: RIGHT FIRST TOE AMPUTATION;  Surgeon: Armen Jefferson DPM;  Location: [facility] OR     C NONSPECIFIC PROCEDURE  10/99    kidney transplant     C NONSPECIFIC PROCEDURE      MRI head, lumbar spine, pelvis     C TOTAL KNEE ARTHROPLASTY  3/03    Knee Replacement, Total right, post op PE     CHOLECYSTECTOMY       HC LEFT HEART CATHETERIZATION      Cardiac Cath, Left Heart, Negative  (@ [facility])     HC LEFT HEART CATHETERIZATION      normal coronary angiogram at [facility], had mild troponin rise (0.71)     HC REMV CATARACT EXTRACAP,INSERT LENS      bilateral cataract repaired, left eye        Social History     Tobacco Use     Smoking status: Former Smoker     Years: 17.00     Last attempt to quit: 1987     Years since quittin.1     Smokeless tobacco: Never Used   Substance Use Topics     Alcohol use: No     Family History   Problem Relation Age of Onset     Diabetes Mother      Parkinsonism Mother      Diabetes Brother      Hypertension Father         [id number] pneumonia     Heart Disease Father            Current Outpatient Medications   Medication Sig Dispense Refill     ACETAMINOPHEN PO Take 325-650 mg by mouth every 4 hours as needed.       amLODIPine (NORVASC) 5 MG tablet Take 2 tablets (10 mg) by mouth daily 60 tablet 3     aspirin 81 MG tablet  Take 81 mg by mouth daily       baclofen (LIORESAL) 10 MG tablet Take 1 tablet (10 mg) by mouth 2 times daily as needed for other (Bladder Spasms) 60 tablet 3     blood glucose (ACCU-CHEK COMPACT PLUS) test strip USE TO TEST BLOOD SUGAR THREE TIMES A  each 11     blood glucose (NO BRAND SPECIFIED) lancets standard Use to test blood sugar 3 times daily or as directed. (Accuchek guide) 300 each 3     blood glucose (NO BRAND SPECIFIED) test strip Use to test blood sugar 3 times daily or as directed. (accuchek guide) 300 strip 3     blood glucose (NO BRAND SPECIFIED) test strip Use to test blood sugar 3 times daily or as directed. 700 strip 11     blood glucose calibration (NO BRAND SPECIFIED) solution Use to calibrate blood glucose monitor as needed as directed.(accuchek guide) 3 Bottle 3     blood glucose monitoring (NO BRAND SPECIFIED) meter device kit Use to test blood sugar three times daily or as directed. 1 kit 0     cephALEXin (KEFLEX) 500 MG capsule Take 1 capsule (500 mg) by mouth every 12 hours for 10 days 20 capsule 0     COMFORT EZ PEN NEEDLES 31G X 6 MM miscellaneous USE 4 DAILY OR AS DIRECTED 100 each 3     Cranberry 400 MG TABS Take 400 mg by mouth 2 times daily       cyanocobalamin (CYANOCOBALAMIN) 1000 MCG/ML injection Inject 1 mL (1,000 mcg) into the muscle every 2 months 3 mL 3     diphenoxylate-atropine (LOMOTIL) 2.5-0.025 MG tablet Take 1-2 tablets by mouth 4 times daily as needed for diarrhea 360 tablet 0     famotidine (PEPCID) 20 MG tablet Take 1 tablet (20 mg) by mouth 2 times daily 60 tablet 1     gabapentin (NEURONTIN) 300 MG capsule Take 2 capsules (600 mg) by mouth 2 times daily May take one additional capsule with severe pain at night, but not daily 70 capsule 1     insulin aspart (NOVOLOG FLEXPEN) 100 UNIT/ML pen Take 10 units with meals + sliding scale if eating. Sliding scale only at bedtime when not eating. Max 40 units per day  Sliding scale:   140-199 2u  200-249 4u  250-299  "6u  300-349 8u  350-399 10u  Over 400 12u 45 mL 3     insulin glargine (LANTUS SOLOSTAR PEN) 100 UNIT/ML pen Inject 30 Units Subcutaneous At Bedtime 45 mL 3     labetalol (NORMODYNE) 300 MG tablet Take 0.5 tablets (150 mg) by mouth 2 times daily 60 tablet 1     Lactobacillus Rhamnosus, GG, (CULTURELL) capsule Take 1 capsule by mouth 2 times daily 60 capsule 11     levETIRAcetam (KEPPRA) 250 MG tablet Take 1 tablet (250 mg) by mouth 2 times daily 60 tablet 0     levothyroxine (SYNTHROID/LEVOTHROID) 50 MCG tablet TAKE ONE TABLET BY MOUTH EVERY DAY 90 tablet 0     lidocaine (XYLOCAINE) 2 % topical gel Apply topically as needed for moderate pain 10 ML every 3 weeks 30 mL 11     loratadine (CLARITIN) 10 MG tablet Take 1 tablet (10 mg) by mouth daily 90 tablet 3     menthol-zinc oxide (CALMOSEPTINE) 0.44-20.625 % OINT ointment Apply topically 4 times daily as needed for skin protection       ondansetron (ZOFRAN ODT) 4 MG ODT tab Take 1 tablet (4 mg) by mouth daily as needed for nausea 30 minutes before getting up in your wheelchair 20 tablet 1     order for DME Equipment being ordered: Tegaderm 4x4   MICHELLE 6 months  Sacral wound 1 Box 3     order for DME Equipment being ordered: [place] gel-T cushion 20 inches wide & 18 inches deep    height 1.676 m (5' 6\"), weight 101.6 kg (223 lb 15.8 oz) 1 each 0     pravastatin (PRAVACHOL) 10 MG tablet Take 1 tablet (10 mg) by mouth daily 90 tablet 3     prazosin (MINIPRESS) 1 MG capsule Take 2 capsules (2 mg) by mouth At Bedtime (Patient taking differently: Take 1-2 mg by mouth nightly as needed ) 60 capsule 1     predniSONE (DELTASONE) 5 MG tablet Take 1 tablet (5 mg) by mouth daily 90 tablet 3     simethicone (MYLICON) 125 MG CHEW Take 1 tablet (125 mg) by mouth as needed for intestinal gas 120 tablet      tacrolimus (GENERIC EQUIVALENT) 1 MG capsule Take 2 capsules (2 mg) by mouth 2 times daily 120 capsule 11     traMADol (ULTRAM) 50 MG tablet Take 1-2 tablets every 6 hours for " post operative pain. 20 tablet 0     VITAMIN D, CHOLECALCIFEROL, PO Take 4,000 Units by mouth 2 times daily       Allergies   Allergen Reactions     Atorvastatin Calcium      myalgias, muscle aches     Codeine Nausea and Vomiting     Darvocet [Propoxyphene N-Apap] Nausea and Vomiting     Hydromorphone      Levofloxacin Other (See Comments) and Diarrhea     hallucinations     Morphine      Nausea and vomiting     Niaspan [Niacin] GI Disturbance     Flushing even at low dose, GI upset     Percocet [Oxycodone-Acetaminophen]      Vomiting after taking med couple of times     Vicodin [Hydrocodone-Acetaminophen] Nausea and Vomiting     Recent Labs   Lab Test 07/19/19  0832 07/18/19  1137  07/15/19  1546 07/01/19  1208  06/04/19  0739 06/03/19  0730  04/24/19  1258  02/28/19  0612  09/16/18  0751  12/05/16  1210  02/05/15  1250  05/16/13  1240   A1C  --   --   --   --   --   --   --   --   --  6.2*  --  7.3*  --  8.2*   < > 7.4*   < > 6.9*   < > 7.2*   LDL  --   --   --   --   --   --   --   --   --   --   --   --   --   --   --  64  --  18  --  149*   HDL  --   --   --   --   --   --   --   --   --   --   --   --   --   --   --  46*  --  43*  --  42*   TRIG  --   --   --   --   --   --   --   --   --   --   --   --   --   --   --  272*  --  223*  --  262*   ALT 11  --   --  18  --   --  21  --    < > 22  --   --    < >  --    < >  --    < >  --    < >  --    CR 1.53* 1.55*   < > 1.98* 1.68*   < > 1.57*  --    < > 1.78*   < > 2.26*   < > 1.88*   < >  --    < > 0.67   < > 0.70   GFRESTIMATED 33* 33*   < > 25* 30*   < > 32*  --    < > 28*   < > 21*   < > 26*   < >  --    < > 87   < > 84   GFRESTBLACK 39* 38*   < > 28* 35*   < > 38*  --    < > 32*   < > 24*   < > 32*   < >  --    < > >90   GFR Calc     < > >90   POTASSIUM 4.1 4.2   < > 4.8 4.5   < > 4.3  --    < > 4.5   < > 5.5*   < > 4.2   < >  --    < > 4.3   < > 3.9   TSH  --   --   --   --  2.43  --   --  1.28  --   --   --   --   --   --    < >  --    < >  " --    < >  --     < > = values in this interval not displayed.      BP Readings from Last 3 Encounters:   07/21/19 149/64   06/14/19 140/70   06/07/19 158/53    Wt Readings from Last 3 Encounters:   07/21/19 107.9 kg (237 lb 14 oz)   06/14/19 113.9 kg (251 lb)   06/07/19 113.7 kg (250 lb 10.6 oz)        Labs reviewed in EPIC  Problem list, Medication list, Allergies, and Medical/Social/Surgical histories reviewed in [facility] and updated as appropriate.     ROS: Constitutional, neuro, ENT, endocrine, pulmonary, cardiac, gastrointestinal, genitourinary, musculoskeletal, integument and psychiatric systems are negative, except as otherwise noted above in the HPI.    {Scribe Signature}   OBJECTIVE:                                                    There were no vitals taken for this visit.  There is no height or weight on file to calculate BMI.    {Exam List:838709}    Mental Status Assessment:  Appearance:   {Appearance:580715::\"Appropriate \"}  Eye Contact:   {Eye Contact:405168::\"Good \"}  Psychomotor Behavior: {Psychomotor Behavior:661544::\"Normal \"}  Attitude:   {Attitude:344450::\"Cooperative \"}  Orientation:   {Orientation:851686::\"All\"}  Speech   Rate / Production: {Speech Rate/Production:263520::\"Normal \"}   Volume:  {Speech Volume:179528::\"Normal \"}  Mood:    {Mood:386107::\"Normal\"}  Affect:    {Affect:903871::\"Appropriate \"}  Thought Content:  {Thought Content:359182::\"Clear \"}  Thought Form:  {Thought Form:004821::\"Coherent \",\"Logical \"}  Insight:    {Insight:242405::\"Good \"}  Attention Span and Concentration:  limited  Recent and Remote Memory:  intact  Fund of Knowledge: appropriate  Muscle Strength and Tone: normal   Suicidal Ideation: reports no thoughts, no intention  Hallucination: {YES / NO:634808::\"Yes\"}  Paranoid-{YES / NO:285955::\"Yes\"}  Manic-{YES / NO:972732::\"Yes\"}  Panic-{YES / NO:795244::\"Yes\"}  Self harm-{YES / NO:876045::\"Yes\"}      See [facility] notes ***    {Diagnostic Test Results:892224::\"Diagnostic " "Test Results:\",\"none \"}     ASSESSMENT/PLAN:                                                    ***    Patient Instructions:  ***        {Provider Disclaimer}    Marivel Barcenas NP, APRN [facility]    {make me the author button}         "

## 2019-07-31 NOTE — Clinical Note
Can you call them with a Keppra TaperAlso the hospital wanted us to evaluate her for anticoagulant she is not interested and I think unnecessary, what do you think?Thanks Klaudia

## 2019-07-31 NOTE — PATIENT INSTRUCTIONS
We will consult with PharmD about a Keppra taper and about a anticoagulant     I will call Dr. Jefferson about removing your sutures on 8/9    We will send in a DME for your CPAP mask     We will write a letter of medical necessity for your ambulance rides

## 2019-07-31 NOTE — PROGRESS NOTES
Gem Jade is a 73 year old female who is seen in the home due to inability to get out of the home unaided, obesity, bedbound, generalized weakness,  today for the following health issues:       Hospital Follow-up Visit:    Hospital/Nursing Home/IP Rehab Facility: [facility]  Date of Admission: 7/15/19  Date of Discharge: 7/21/19  Reason(s) for Admission: UTI, osteomyelitis, encephalopathy              Problems taking medications regularly:  None       Medication changes since discharge: would like to consider tapering off Keppra, gabapentin reduced        Problems adhering to non-medication therapy:  None    Summary of hospitalization:  [facility] discharge summary reviewed  Diagnostic Tests/Treatments reviewed.  Follow up needed: BMP  Other Healthcare Providers Involved in Patient s Care:         Homecare and Specialist appointment - Dr. Jefferson, podiatry in 2 weeks   Update since discharge: stable, unable to get into podiatry clinic, sutures need to removed on 8/9, back to baseline with her DM and cognition, pain controled, rarely taking tramadol, took it one time     Post Discharge Medication Reconciliation: discharge medications reconciled and changed, per note/orders (see AVS).  Plan of care communicated with patient and family     Coding guidelines for this visit:  Type of Medical   Decision Making Face-to-Face Visit       within 7 Days of discharge Face-to-Face Visit        within 14 days of discharge   Moderate Complexity [id number]   High Complexity [id number]           Acute encephalopathy, secondary to catheter related urinary tract infection  Chronic indwelling Johnson  Assessment: History of frequent urinary tract infections associated with chronic indwelling Johnson catheter.  Most recent urine cultures 4/24/2019 and 5/10/2019 demonstrated greater than 100,000 colony-forming units of Citrobacter.  Has a history of VRE, MRSA, and ESBL. Note that patient has followed with  infectious disease clinic in the past related to recurrent urinary tract infections with colonization. AMS is likely secondary to recurrent UTI, supra therapeutic Keppra also likely contributing. Neuro feels less likely seizure. UC on admission now with MSSA  Plan:  - indwelling Johnson catheter for retention  - BC with Corynebacterium striatum (contaminant likely per ID)  - IV Invanz -> PO Keflex  till   - IVF  - ID signed off  Bone culture grew staph aureus light growth. Discussed with  today with Podiatry he feels like most of the infection sugically removed. He will see her in the clinic for close follow up   Ok to discharge with oral keflex for 10more days as per ID plan      Right Great Toe Redness/Swelling  Osteomyelitis  Assessment: have concern about deep infection in addition to likely cellulitis. MRI confirms likely osteomyelitis. amputation done . EKG on admission with SR, medically cleared for surgery.   Plan:  - Podiatry managing post op care  - amputation on the right great toe one done   - Abx per ID, currently on Keflex for chronic osteomyelits and UTI due for an other 10 days per ID note   -     Obstructive sleep apnea on CPAP:   -CPAP as per home settings      Type 2 diabetes with retinopathy, nephropathy, neuropathy including diabetic gastroparesis   Assessment: most recent A1c 6.2 on 2019. PTA on Glargine 30 U at bedtime.  Plan:  - resumed post op DM plan as prior to surgery   - sliding scale insulin as needed  - Hypoglycemia protocol     End-stage renal disease status post historic right lower quadrant renal transplant:   ROB on CKD   Assessment: Renal transplant in .  Uncertain if this was living or  donor. Cr on admission 1.98, elevated from baseline 1.54-1.82. Cr improving with IVF. 1.53 today  Plan:  - Tacrolimus level pending  - IVF  - Continue prednisone 5 mg daily at this time  - Continue tacrolimus  - Avoid NSAIDs/nephrotoxins     Severe  obesity:   -Increased risk of all cause mortality  -Outpatient follow-up.     History of provoked bilateral PE:   -not on anticoagulation. Follow with PCP     Paroxysmal atrial fibrillation:  Underwent 48-hour Holter monitoring in May 2016 demonstrating sinus rhythm with occasional supraventricular tachycardia.  No atrial fibrillation reported on that study. In sinus rhythm on admission.  At time of event in 2016, recognition was for discussion with primary care provider regarding risk versus benefit of anticoagulation. [id number] score of 3 for age, female sex, diabetes.  Plan:  -Defer further discussion of anticoagulation to primary care provider.          History of seizure disorder:   - Keppra level elevated. EEG with no epileptic activity. Neuro was consulted, recommended that given hx of seizure disorder, they recommend continuing and restart of dose reduced at 250 mg bid.    Plan:   - Hold PTA keppra for now given acute hallucinations  - Will need to discuss with  whether to resume prior to discharge. Initial discussions he would like her to be off Keppra but unclear if he knows about neurology recommendation     Hypertension:  -Continue prior to admission amlodipine 10 mg daily and Labetalol 150mg BID.   - BP on high side but post op pain. Treat pain and reassess     Cystic finding in the pancreatic body.  Incidental on extensive imaging in the emergency department.  Has been noted previously as well.  Question of intraductal papillary mucinous neoplasm.  Patient follows with Dr. Chen of gastroenterology, largely for her gastroparesis.  - follow-up with gastroenterology.      Hypothyroidism:  -Continue prior to admission levothyroxine 50 mcg daily      Diabetes Follow-up      How often are you checking your blood sugar? Three times daily    What time of day are you checking your blood sugars (select all that apply)?  Before and after meals and At bedtime    Have you had any blood sugars above 200?   Yes 198,201,228,126,182,105,159,202,97,140,263,208,334,167,188    Have you had any blood sugars below 70?  No    What symptoms do you notice when your blood sugar is low?  Shaky and Weak    What concerns do you have today about your diabetes? Getting infections often     Do you have any of these symptoms? (Select all that apply)  Numbness in feet and Burning in feet     Have you had a diabetic eye exam in the last 12 months? No    BP Readings from Last 2 Encounters:   19 138/68   19 149/64     Hemoglobin A1C (%)   Date Value   2019 6.2 (H)   2019 7.3 (H)     LDL Cholesterol Calculated (mg/dL)   Date Value   2016 64   2015 18     Seizure Activity  Duration of complaint: no known activity, dx in 2018 as a possible cause of her encephalopathy and started on Keppra, no epileptic brain ways identified in any of her EEG's, including the most recent one 19, keppra dose reduced to 250mg 2x daily, patient would like to taper off, no improvement with mental confusion on Keppra     Hypertension Follow-up      Do you check your blood pressure regularly outside of the clinic? Yes     Are you following a low salt diet? No    Are your blood pressures ever more than 140 on the top number (systolic) OR more   than 90 on the bottom number (diastolic), for example 140/90? Yes      Problems taking medications regularly: No    Medication side effects: none    Diet: diabetic    Social History     Tobacco Use     Smoking status: Former Smoker     Years: 17.00     Last attempt to quit: 1987     Years since quittin.1     Smokeless tobacco: Never Used   Substance Use Topics     Alcohol use: No        Problem list and histories reviewed & adjusted, as indicated.  Additional history: as documented    Patient Active Problem List   Diagnosis     Kidney replaced by transplant     Primary localized osteoarthrosis, lower leg     Pulmonary embolism and infarction (H)     Diabetes mellitus with  background retinopathy (H)     Hyperlipidemia LDL goal <100     Diabetic gastroparesis (H)     Back pain     Compression fx, thoracic spine (H)     Anemia     Clostridium difficile diarrhea     Hydronephrosis     Recurrent UTI     Benign essential hypertension     Vancomycin resistant Enterococcus     Clostridium difficile colitis     Diabetic polyneuropathy associated with type 2 diabetes mellitus (H)     Chronic shoulder pain, unspecified laterality     Morbid obesity, unspecified obesity type (H)     Health Care Home     Paroxysmal A-fib (H)     Acute kidney injury (H)     Pancytopenia, acquired (H)     Obstructive sleep apnea syndrome     Complicated UTI (urinary tract infection)     Altered Mental Status, Probable Metabolic encephalopathy     Chronic indwelling Johnson catheter     Seizure disorder (H)     Dermatitis     Encephalopathy acute     Type 2 diabetes mellitus with diabetic polyneuropathy, with long-term current use of insulin (H)     Sepsis (H)     Overdose, accidental or unintentional, subsequent encounter     CKD (chronic kidney disease) stage 3, GFR 30-59 ml/min (H)     Paranoia (H)     Altered mental status     Past Surgical History:   Procedure Laterality Date     AMPUTATE TOE(S) Right 7/19/2019    Procedure: RIGHT FIRST TOE AMPUTATION;  Surgeon: Armen Jefferson DPM;  Location:  OR     C NONSPECIFIC PROCEDURE  10/99    kidney transplant     C NONSPECIFIC PROCEDURE  8/00    MRI head, lumbar spine, pelvis     C TOTAL KNEE ARTHROPLASTY  3/03    Knee Replacement, Total right, post op PE     CHOLECYSTECTOMY       HC LEFT HEART CATHETERIZATION  1999    Cardiac Cath, Left Heart, Negative  (@ [facility])     HC LEFT HEART CATHETERIZATION  4/05    normal coronary angiogram at [facility], had mild troponin rise (0.71)     HC REMV CATARACT EXTRACAP,INSERT LENS  8/04    bilateral cataract repaired, left eye        Social History     Tobacco Use     Smoking status: Former Smoker     Years: 17.00     Last attempt to  quit: 1987     Years since quittin.1     Smokeless tobacco: Never Used   Substance Use Topics     Alcohol use: No     Family History   Problem Relation Age of Onset     Diabetes Mother      Parkinsonism Mother      Diabetes Brother      Hypertension Father         [id number] pneumonia     Heart Disease Father            Current Outpatient Medications   Medication Sig Dispense Refill     ACETAMINOPHEN PO Take 325-650 mg by mouth every 4 hours as needed.       amLODIPine (NORVASC) 5 MG tablet Take 2 tablets (10 mg) by mouth daily 60 tablet 3     aspirin 81 MG tablet Take 81 mg by mouth daily       baclofen (LIORESAL) 10 MG tablet Take 1 tablet (10 mg) by mouth 2 times daily as needed for other (Bladder Spasms) 60 tablet 3     blood glucose (ACCU-CHEK COMPACT PLUS) test strip USE TO TEST BLOOD SUGAR THREE TIMES A  each 11     blood glucose (NO BRAND SPECIFIED) lancets standard Use to test blood sugar 3 times daily or as directed. (Accuchek guide) 300 each 3     blood glucose (NO BRAND SPECIFIED) test strip Use to test blood sugar 3 times daily or as directed. (accuchek guide) 300 strip 3     blood glucose (NO BRAND SPECIFIED) test strip Use to test blood sugar 3 times daily or as directed. 700 strip 11     blood glucose calibration (NO BRAND SPECIFIED) solution Use to calibrate blood glucose monitor as needed as directed.(accuchek guide) 3 Bottle 3     blood glucose monitoring (NO BRAND SPECIFIED) meter device kit Use to test blood sugar three times daily or as directed. 1 kit 0     cephALEXin (KEFLEX) 500 MG capsule Take 1 capsule (500 mg) by mouth every 12 hours for 10 days 20 capsule 0     COMFORT EZ PEN NEEDLES 31G X 6 MM miscellaneous USE 4 DAILY OR AS DIRECTED 100 each 3     Cranberry 400 MG TABS Take 400 mg by mouth 2 times daily       cyanocobalamin (CYANOCOBALAMIN) 1000 MCG/ML injection Inject 1 mL (1,000 mcg) into the muscle every 2 months 3 mL 3     diphenoxylate-atropine (LOMOTIL) 2.5-0.025  "MG tablet Take 1-2 tablets by mouth 4 times daily as needed for diarrhea 360 tablet 0     famotidine (PEPCID) 20 MG tablet Take 1 tablet (20 mg) by mouth 2 times daily 60 tablet 1     gabapentin (NEURONTIN) 300 MG capsule Take 2 capsules (600 mg) by mouth 2 times daily May take one additional capsule with severe pain at night, but not daily 70 capsule 1     insulin aspart (NOVOLOG FLEXPEN) 100 UNIT/ML pen Take 10 units with meals + sliding scale if eating. Sliding scale only at bedtime when not eating. Max 40 units per day  Sliding scale:   140-199 2u  200-249 4u  250-299 6u  300-349 8u  350-399 10u  Over 400 12u 45 mL 3     insulin glargine (LANTUS SOLOSTAR PEN) 100 UNIT/ML pen Inject 30 Units Subcutaneous At Bedtime 45 mL 3     labetalol (NORMODYNE) 300 MG tablet Take 0.5 tablets (150 mg) by mouth 2 times daily 60 tablet 1     Lactobacillus Rhamnosus, GG, (CULTURELL) capsule Take 1 capsule by mouth 2 times daily 60 capsule 11     levETIRAcetam (KEPPRA) 250 MG tablet Take 1 tablet (250 mg) by mouth 2 times daily 60 tablet 0     levothyroxine (SYNTHROID/LEVOTHROID) 50 MCG tablet TAKE ONE TABLET BY MOUTH EVERY DAY 90 tablet 0     lidocaine (XYLOCAINE) 2 % topical gel Apply topically as needed for moderate pain 10 ML every 3 weeks 30 mL 11     loratadine (CLARITIN) 10 MG tablet Take 1 tablet (10 mg) by mouth daily 90 tablet 3     menthol-zinc oxide (CALMOSEPTINE) 0.44-20.625 % OINT ointment Apply topically 4 times daily as needed for skin protection       ondansetron (ZOFRAN ODT) 4 MG ODT tab Take 1 tablet (4 mg) by mouth daily as needed for nausea 30 minutes before getting up in your wheelchair 20 tablet 1     order for DME Equipment being ordered: Tegaderm 4x4   MICHELLE 6 months  Sacral wound 1 Box 3     order for DME Equipment being ordered: Focus IP gel-T cushion 20 inches wide & 18 inches deep    height 1.676 m (5' 6\"), weight 101.6 kg (223 lb 15.8 oz) 1 each 0     pravastatin (PRAVACHOL) 10 MG tablet Take 1 " tablet (10 mg) by mouth daily 90 tablet 3     prazosin (MINIPRESS) 1 MG capsule Take 2 capsules (2 mg) by mouth At Bedtime (Patient taking differently: Take 1-2 mg by mouth nightly as needed ) 60 capsule 1     predniSONE (DELTASONE) 5 MG tablet Take 1 tablet (5 mg) by mouth daily 90 tablet 3     simethicone (MYLICON) 125 MG CHEW Take 1 tablet (125 mg) by mouth as needed for intestinal gas 120 tablet      tacrolimus (GENERIC EQUIVALENT) 1 MG capsule Take 2 capsules (2 mg) by mouth 2 times daily 120 capsule 11     traMADol (ULTRAM) 50 MG tablet Take 1-2 tablets every 6 hours for post operative pain. 20 tablet 0     VITAMIN D, CHOLECALCIFEROL, PO Take 4,000 Units by mouth 2 times daily       Allergies   Allergen Reactions     Atorvastatin Calcium      myalgias, muscle aches     Codeine Nausea and Vomiting     Darvocet [Propoxyphene N-Apap] Nausea and Vomiting     Hydromorphone      Levofloxacin Other (See Comments) and Diarrhea     hallucinations     Morphine      Nausea and vomiting     Niaspan [Niacin] GI Disturbance     Flushing even at low dose, GI upset     Percocet [Oxycodone-Acetaminophen]      Vomiting after taking med couple of times     Vicodin [Hydrocodone-Acetaminophen] Nausea and Vomiting     Recent Labs   Lab Test 07/19/19  0832 07/18/19  1137  07/15/19  1546 07/01/19  1208  06/04/19  0739 06/03/19  0730  04/24/19  1258  02/28/19  0612  09/16/18  0751  12/05/16  1210  02/05/15  1250  05/16/13  1240   A1C  --   --   --   --   --   --   --   --   --  6.2*  --  7.3*  --  8.2*   < > 7.4*   < > 6.9*   < > 7.2*   LDL  --   --   --   --   --   --   --   --   --   --   --   --   --   --   --  64  --  18  --  149*   HDL  --   --   --   --   --   --   --   --   --   --   --   --   --   --   --  46*  --  43*  --  42*   TRIG  --   --   --   --   --   --   --   --   --   --   --   --   --   --   --  272*  --  223*  --  262*   ALT 11  --   --  18  --   --  21  --    < > 22  --   --    < >  --    < >  --    < >  --    <  >  --    CR 1.53* 1.55*   < > 1.98* 1.68*   < > 1.57*  --    < > 1.78*   < > 2.26*   < > 1.88*   < >  --    < > 0.67   < > 0.70   GFRESTIMATED 33* 33*   < > 25* 30*   < > 32*  --    < > 28*   < > 21*   < > 26*   < >  --    < > 87   < > 84   GFRESTBLACK 39* 38*   < > 28* 35*   < > 38*  --    < > 32*   < > 24*   < > 32*   < >  --    < > >90   GFR Calc     < > >90   POTASSIUM 4.1 4.2   < > 4.8 4.5   < > 4.3  --    < > 4.5   < > 5.5*   < > 4.2   < >  --    < > 4.3   < > 3.9   TSH  --   --   --   --  2.43  --   --  1.28  --   --   --   --   --   --    < >  --    < >  --    < >  --     < > = values in this interval not displayed.      BP Readings from Last 3 Encounters:   07/31/19 138/68   07/21/19 149/64   06/14/19 140/70    Wt Readings from Last 3 Encounters:   07/21/19 107.9 kg (237 lb 14 oz)   06/14/19 113.9 kg (251 lb)   06/07/19 113.7 kg (250 lb 10.6 oz)        Labs reviewed in EPIC  Problem list, Medication list, Allergies, and Medical/Social/Surgical histories reviewed in [facility] and updated as appropriate.     ROS: Constitutional, neuro, ENT, endocrine, pulmonary, cardiac, gastrointestinal, genitourinary, musculoskeletal, integument and psychiatric systems are negative, except as otherwise noted above in the HPI.      OBJECTIVE:                                                    /68 (BP Location: Left arm, Patient Position: Supine, Cuff Size: Adult Regular)   Pulse 61   SpO2 99%   There is no height or weight on file to calculate BMI.    GENERAL: alert, no distress, over weight, elderly and fatigued  RESP: lungs clear to auscultation - no rales, rhonchi or wheezes  CV: regular rate and rhythm, normal S1 S2, no S3 or S4, no murmur, click or rub, no peripheral edema   ABDOMEN: soft, nontender, bowel sounds normal  MS: no gross musculoskeletal defects noted, right foot bandaged, unable to visualize toe amputation   SKIN: no suspicious lesions or rashes    Mental Status  Assessment:  Appearance:   Appropriate   Eye Contact:   Poor  Psychomotor Behavior: Normal   Attitude:   Cooperative   Orientation:   All  Speech   Rate / Production: Hyperverbal  Normal    Volume:  Normal   Mood:    Normal  Affect:    Appropriate  Labile   Thought Content:  Clear   Thought Form:  Coherent  Goal Directed   Insight:    Fair   Attention Span and Concentration:  limited  Recent and Remote Memory:  intact  Fund of Knowledge: appropriate  Muscle Strength and Tone: normal   Suicidal Ideation: reports no thoughts, no intention  Hallucination: No  Paranoid-No  Manic-No  Panic-No  Self harm-No        Diagnostic Test Results:  Results for orders placed or performed during the hospital encounter of 07/15/19   Head CT w/o contrast    Narrative    CT SCAN OF THE HEAD WITHOUT CONTRAST   7/15/2019 4:09 PM     HISTORY: Altered mental status (LOC), unexplained    TECHNIQUE:  Axial images of the head and coronal reformations without  IV contrast material. Radiation dose for this scan was reduced using  automated exposure control, adjustment of the mA and/or kV according  to patient size, or iterative reconstruction technique.    COMPARISON: Scan dated 6/2/2019.    FINDINGS:     Intracranial contents: There is diffuse parenchymal volume loss.   White matter changes are present in the cerebral hemispheres that are  consistent with small vessel ischemic disease in this age patient.  There is no evidence of intracranial hemorrhage, mass, acute infarct  or anomaly.    Visualized orbits/sinuses/mastoids:  The visualized portions of the  sinuses and mastoids appear normal.    Osseous structures/soft tissues:  There is no evidence of trauma.      Impression    IMPRESSION: No acute pathology, no bleed, mass, or areas of acute  infarction are identified. No significant change.      NAYANA GONSALES MD   Chest XR,  PA & LAT    Narrative    XR CHEST 2 VW 7/15/2019 4:17 PM    HISTORY: Altered mental status.    COMPARISON: 3/1/2019,  9/15/2018    FINDINGS: Chronic elevation of the right hemidiaphragm. No airspace  consolidation, effusion or pneumothorax. Stable heart size.      Impression    IMPRESSION: No acute abnormality.    CÉSAR NORTON MD   XR Toe Right G/E 2 Views    Narrative    THREE VIEWS RIGHT GREAT TOE July 17, 2019 2:16 PM    HISTORY: Swelling and tenderness of the great toe.    COMPARISON: None.    FINDINGS: No acute fracture or osseous lesion is seen. There is  prominent hallux valgus and moderate if not marked degenerative change  of the first metatarsophalangeal joint. There are moderate  degenerative changes of the IP joint of the great toe. There are  additional degenerative changes and hammertoe deformities of the  remaining visualized toes. There is calcification in the vascular  structures. No other abnormality is seen.      Impression    IMPRESSION: Degenerative changes with no acute abnormality seen.     JESS ESCOBEDO MD   MR Foot Right w/o Contrast    Narrative    MR FOOT RIGHT WITHOUT CONTRAST   7/17/2019 9:27 PM     HISTORY:  Osteomyelitis suspected, foot swelling, diabetic; necrotic  wound, dorsal right hallux; wound probes to bone and into the  interphalangeal joint.    TECHNIQUE:  Sagittal, long axis, and short axis T1 and inversion  recovery pulse sequences.    COMPARISON: 7/17/2019    FINDINGS:  There appears to be ill-defined marrow edema in the distal  third of the great toe proximal phalanx. There may be a small erosion  along the dorsal aspect of the head. Mild marrow edema is also noted  within the distal phalangeal base. No IP joint fluid is visible. No  metatarsophalangeal joint effusion. Forefoot soft tissue edema is  noted, greatest along the dorsum of the forefoot. No discrete fluid  collection is visible. Marrow signal within remainder of the forefoot  appears essentially within normal limits.      Impression    IMPRESSION:  1. Great toe proximal phalangeal head small cyst versus erosion  and  adjacent marrow edema. This is highly suspicious for osteomyelitis,  particularly if there is an adjacent wound or draining sinus.  2. Great toe distal phalangeal base mild marrow edema which could be  reactive or represent an additional site of osteomyelitis.  3. Soft tissue edema in the forefoot, greatest dorsally. MR cannot  distinguish reactive edema from cellulitis. No discrete soft tissue  fluid collection or abscess is visible.    SYLVIA LUGO MD   XR Foot Right G/E 3 Views    Narrative    RIGHT FOOT THREE OR MORE VIEWS  7/20/2019 1:19 PM     HISTORY:  Postop; new baseline status post hallux amputation.    COMPARISON: 7/17/2019 MRI.    FINDINGS: Interval recent postoperative changes great toe amputation.  Diffuse soft tissue swelling. Advanced osteoporosis. No evidence for  other acute osseous pathology. Multiple hammertoe deformities.  Degenerative changes in the midfoot and hindfoot. No acute fractures  or bone destruction evident.       Impression    IMPRESSION: Great toe amputation.     SHUN WATSON MD   CBC with platelets differential   Result Value Ref Range    WBC 8.4 4.0 - 11.0 10e9/L    RBC Count 3.89 3.8 - 5.2 10e12/L    Hemoglobin 11.4 (L) 11.7 - 15.7 g/dL    Hematocrit 36.3 35.0 - 47.0 %    MCV 93 78 - 100 fl    MCH 29.3 26.5 - 33.0 pg    MCHC 31.4 (L) 31.5 - 36.5 g/dL    RDW 14.5 10.0 - 15.0 %    Platelet Count 200 150 - 450 10e9/L    Diff Method Automated Method     % Neutrophils 76.0 %    % Lymphocytes 15.9 %    % Monocytes 6.3 %    % Eosinophils 1.5 %    % Basophils 0.1 %    % Immature Granulocytes 0.2 %    Nucleated RBCs 0 0 /100    Absolute Neutrophil 6.4 1.6 - 8.3 10e9/L    Absolute Lymphocytes 1.3 0.8 - 5.3 10e9/L    Absolute Monocytes 0.5 0.0 - 1.3 10e9/L    Absolute Eosinophils 0.1 0.0 - 0.7 10e9/L    Absolute Basophils 0.0 0.0 - 0.2 10e9/L    Abs Immature Granulocytes 0.0 0 - 0.4 10e9/L    Absolute Nucleated RBC 0.0    Comprehensive metabolic panel   Result Value Ref Range     Sodium 141 133 - 144 mmol/L    Potassium 4.8 3.4 - 5.3 mmol/L    Chloride 109 94 - 109 mmol/L    Carbon Dioxide 23 20 - 32 mmol/L    Anion Gap 9 3 - 14 mmol/L    Glucose 192 (H) 70 - 99 mg/dL    Urea Nitrogen 53 (H) 7 - 30 mg/dL    Creatinine 1.98 (H) 0.52 - 1.04 mg/dL    GFR Estimate 25 (L) >60 mL/min/[1.73_m2]    GFR Estimate If Black 28 (L) >60 mL/min/[1.73_m2]    Calcium 9.3 8.5 - 10.1 mg/dL    Bilirubin Total 0.3 0.2 - 1.3 mg/dL    Albumin 3.1 (L) 3.4 - 5.0 g/dL    Protein Total 8.5 6.8 - 8.8 g/dL    Alkaline Phosphatase 113 40 - 150 U/L    ALT 18 0 - 50 U/L    AST 16 0 - 45 U/L   UA with Microscopic   Result Value Ref Range    Color Urine Yellow     Appearance Urine Slightly Cloudy     Glucose Urine 150 (A) NEG^Negative mg/dL    Bilirubin Urine Negative NEG^Negative    Ketones Urine Negative NEG^Negative mg/dL    Specific Gravity Urine 1.014 1.003 - 1.035    Blood Urine Moderate (A) NEG^Negative    pH Urine 6.0 5.0 - 7.0 pH    Protein Albumin Urine 300 (A) NEG^Negative mg/dL    Urobilinogen mg/dL Normal 0.0 - 2.0 mg/dL    Nitrite Urine Positive (A) NEG^Negative    Leukocyte Esterase Urine Large (A) NEG^Negative    Source Catheterized Urine     WBC Urine >182 (H) 0 - 5 /HPF    RBC Urine 128 (H) 0 - 2 /HPF    WBC Clumps Present (A) NEG^Negative /HPF   Keppra (Levetiracetam) Level   Result Value Ref Range    Keppra (Levetiracetam) Level 83 (H) 12 - 46 ug/mL   Tacrolimus level   Result Value Ref Range    Tacrolimus Last Dose UNKNOWN     Tacrolimus Level 4.8 (L) 5.0 - 15.0 ug/L   Glucose by meter   Result Value Ref Range    Glucose 156 (H) 70 - 99 mg/dL   Basic metabolic panel   Result Value Ref Range    Sodium 142 133 - 144 mmol/L    Potassium 4.1 3.4 - 5.3 mmol/L    Chloride 115 (H) 94 - 109 mmol/L    Carbon Dioxide 19 (L) 20 - 32 mmol/L    Anion Gap 8 3 - 14 mmol/L    Glucose 71 70 - 99 mg/dL    Urea Nitrogen 46 (H) 7 - 30 mg/dL    Creatinine 1.81 (H) 0.52 - 1.04 mg/dL    GFR Estimate 27 (L) >60  mL/min/[1.73_m2]    GFR Estimate If Black 32 (L) >60 mL/min/[1.73_m2]    Calcium 8.4 (L) 8.5 - 10.1 mg/dL   CBC with platelets   Result Value Ref Range    WBC 8.3 4.0 - 11.0 10e9/L    RBC Count 3.60 (L) 3.8 - 5.2 10e12/L    Hemoglobin 10.3 (L) 11.7 - 15.7 g/dL    Hematocrit 33.1 (L) 35.0 - 47.0 %    MCV 92 78 - 100 fl    MCH 28.6 26.5 - 33.0 pg    MCHC 31.1 (L) 31.5 - 36.5 g/dL    RDW 14.3 10.0 - 15.0 %    Platelet Count 150 150 - 450 10e9/L   Glucose by meter   Result Value Ref Range    Glucose 120 (H) 70 - 99 mg/dL   Glucose by meter   Result Value Ref Range    Glucose 67 (L) 70 - 99 mg/dL   Glucose by meter   Result Value Ref Range    Glucose 120 (H) 70 - 99 mg/dL   Glucose by meter   Result Value Ref Range    Glucose 96 70 - 99 mg/dL   Glucose by meter   Result Value Ref Range    Glucose 132 (H) 70 - 99 mg/dL   Glucose by meter   Result Value Ref Range    Glucose 139 (H) 70 - 99 mg/dL   Basic metabolic panel   Result Value Ref Range    Sodium 144 133 - 144 mmol/L    Potassium 4.1 3.4 - 5.3 mmol/L    Chloride 116 (H) 94 - 109 mmol/L    Carbon Dioxide 22 20 - 32 mmol/L    Anion Gap 6 3 - 14 mmol/L    Glucose 169 (H) 70 - 99 mg/dL    Urea Nitrogen 38 (H) 7 - 30 mg/dL    Creatinine 1.76 (H) 0.52 - 1.04 mg/dL    GFR Estimate 28 (L) >60 mL/min/[1.73_m2]    GFR Estimate If Black 33 (L) >60 mL/min/[1.73_m2]    Calcium 8.7 8.5 - 10.1 mg/dL   Glucose by meter   Result Value Ref Range    Glucose 162 (H) 70 - 99 mg/dL   Glucose by meter   Result Value Ref Range    Glucose 161 (H) 70 - 99 mg/dL   Glucose by meter   Result Value Ref Range    Glucose 144 (H) 70 - 99 mg/dL   Tacrolimus level   Result Value Ref Range    Tacrolimus Last Dose Not Provided     Tacrolimus Level 8.1 5.0 - 15.0 ug/L   Glucose by meter   Result Value Ref Range    Glucose 175 (H) 70 - 99 mg/dL   Glucose by meter   Result Value Ref Range    Glucose 212 (H) 70 - 99 mg/dL   Glucose by meter   Result Value Ref Range    Glucose 195 (H) 70 - 99 mg/dL    Glucose by meter   Result Value Ref Range    Glucose 139 (H) 70 - 99 mg/dL   Basic metabolic panel   Result Value Ref Range    Sodium 142 133 - 144 mmol/L    Potassium 4.2 3.4 - 5.3 mmol/L    Chloride 117 (H) 94 - 109 mmol/L    Carbon Dioxide 20 20 - 32 mmol/L    Anion Gap 5 3 - 14 mmol/L    Glucose 157 (H) 70 - 99 mg/dL    Urea Nitrogen 29 7 - 30 mg/dL    Creatinine 1.55 (H) 0.52 - 1.04 mg/dL    GFR Estimate 33 (L) >60 mL/min/[1.73_m2]    GFR Estimate If Black 38 (L) >60 mL/min/[1.73_m2]    Calcium 8.9 8.5 - 10.1 mg/dL   Glucose by meter   Result Value Ref Range    Glucose 156 (H) 70 - 99 mg/dL   Glucose by meter   Result Value Ref Range    Glucose 170 (H) 70 - 99 mg/dL   Gabapentin level   Result Value Ref Range    Gabapentin Level 5.0 4.0 - 16.0 ug/mL   Glucose by meter   Result Value Ref Range    Glucose 180 (H) 70 - 99 mg/dL   Ammonia   Result Value Ref Range    Ammonia 18 10 - 50 umol/L   Hepatic panel   Result Value Ref Range    Bilirubin Direct <0.1 0.0 - 0.2 mg/dL    Bilirubin Total 0.3 0.2 - 1.3 mg/dL    Albumin 2.6 (L) 3.4 - 5.0 g/dL    Protein Total 7.0 6.8 - 8.8 g/dL    Alkaline Phosphatase 90 40 - 150 U/L    ALT 11 0 - 50 U/L    AST 10 0 - 45 U/L   CBC with platelets   Result Value Ref Range    WBC 7.3 4.0 - 11.0 10e9/L    RBC Count 3.64 (L) 3.8 - 5.2 10e12/L    Hemoglobin 10.5 (L) 11.7 - 15.7 g/dL    Hematocrit 32.5 (L) 35.0 - 47.0 %    MCV 89 78 - 100 fl    MCH 28.8 26.5 - 33.0 pg    MCHC 32.3 31.5 - 36.5 g/dL    RDW 13.9 10.0 - 15.0 %    Platelet Count 130 (L) 150 - 450 10e9/L   Glucose by meter   Result Value Ref Range    Glucose 140 (H) 70 - 99 mg/dL   Basic metabolic panel   Result Value Ref Range    Sodium 142 133 - 144 mmol/L    Potassium 4.1 3.4 - 5.3 mmol/L    Chloride 116 (H) 94 - 109 mmol/L    Carbon Dioxide 21 20 - 32 mmol/L    Anion Gap 5 3 - 14 mmol/L    Glucose 125 (H) 70 - 99 mg/dL    Urea Nitrogen 31 (H) 7 - 30 mg/dL    Creatinine 1.53 (H) 0.52 - 1.04 mg/dL    GFR Estimate 33 (L)  ">60 mL/min/[1.73_m2]    GFR Estimate If Black 39 (L) >60 mL/min/[1.73_m2]    Calcium 8.5 8.5 - 10.1 mg/dL   Glucose by meter   Result Value Ref Range    Glucose 107 (H) 70 - 99 mg/dL   Glucose by meter   Result Value Ref Range    Glucose 105 (H) 70 - 99 mg/dL   Glucose by meter   Result Value Ref Range    Glucose 114 (H) 70 - 99 mg/dL   Surgical pathology exam   Result Value Ref Range    Copath Report       Patient Name: MARYJANE SHAVER  MR#: [id number]  Specimen #: [id number]  Collected: 7/19/2019  Received: 7/19/2019  Reported: 7/23/2019 11:17  Ordering Phy(s): SHUN ALDANA    For improved result formatting, select 'View Enhanced Report Format' under   Linked Documents section.    SPECIMEN(S):  Right first toe    FINAL DIAGNOSIS:  Digit, right first toe: Amputation:  - Skin with ulceration and underlying granulation tissue with acute and   chronic inflammation  - Underlying bone with evidence of remodeling, fibrosis, and abundant   chronic inflammation, consistent with  chronic osteomyelitis  - Resection margins viable    Electronically signed out by:    Marvin Blum M.D.    CLINICAL HISTORY:  Right first toe osteomyelitis    GROSS:  A single specimen container with formalin is received labeled with the   patient's name, date of birth, and  medical record number. Information on the requisition slip, container, and   associated labels is confirmed.    The specimen is designated \"rig ht first toe\" consisting of a 7 x 3 x 2 cm   toe, disarticulated at the proximal  interphalangeal joint next item the dorsal aspect of the toe contains a 1   x 0.8 cm ulcerated lesion grossly  involving the underlying bone, coming within 1 cm of the nearest skin   resection margin and 2.4 cm of the  articular surface.  The remaining skin and soft tissue grossly appear   viable.  Representative sections are  submitted in two cassettes, following decalcification.    Summary of Sections:  A1 - articular surface  A2 - section of ulcer " to underlying bone (Dictated by: Judy Flores   7/22/2019 01:49 PM)    MICROSCOPIC:  The microscopic findings substantiates the final diagnosis.    The technical component of this testing was completed at the [facility], with the professional component performed   at the [facility]  Laboratory, [facility] [place]  [address] ([phone])    CPT Codes:  A: 85075-VN3, 88 311-DEC    COLLECTION SITE:  Client: [facility]  Location: [facility] (S)       Glucose by meter   Result Value Ref Range    Glucose 112 (H) 70 - 99 mg/dL   Glucose by meter   Result Value Ref Range    Glucose 130 (H) 70 - 99 mg/dL   Glucose by meter   Result Value Ref Range    Glucose 169 (H) 70 - 99 mg/dL   Glucose by meter   Result Value Ref Range    Glucose 127 (H) 70 - 99 mg/dL   Glucose by meter   Result Value Ref Range    Glucose 104 (H) 70 - 99 mg/dL     *Note: Due to a large number of results and/or encounters for the requested time period, some results have not been displayed. A complete set of results can be found in Results Review.        ASSESSMENT/PLAN:                                                    ASSESSMENT / PLAN:  (Z09) Hospital discharge follow-up  (primary encounter diagnosis)  Comment: stable, at baseline, some pain with amputation, rare tramadol use    Plan: discussed tapering off Keppra, will have PharmD contact her and  with taper, not interested in starting an anticoagulant and I don't feel it is necessary, will consult with PharmD     (E11.65,  Z79.4) Type 2 diabetes mellitus with hyperglycemia, with long-term current use of insulin (H)  Comment: back to baseline, a few elevated readings  Plan: discussed diet and working on getting up in her chair     (I10) Benign essential hypertension  Comment: stable, checked by [facility]   Plan: no med changes today     (F22) Paranoia (H)  Comment: increased since back from the  hospital   Plan: discussed taking prazocin regularly, 2 tabs instead of 1     (E11.42) Diabetic polyneuropathy associated with type 2 diabetes mellitus (H)  Comment: reduced dose due to kidney function   Plan: gabapentin (NEURONTIN) 300 MG capsule        rx rewritten for 2 month supply 1200mg daily with occ extra 300mg but not daily     (G47.33) Obstructive sleep apnea syndrome  Comment: needs new mask, The pt is compliant with consistent and proper use of her Cpap and she benefits from consistent use.   Plan: order for DME        Placed and faxed to  DME    Patient Instructions   We will consult with PharmD about a Keppra taper and about a anticoagulant     I will call Dr. Jefferson about removing your sutures on 8/9    We will send in a DME for your CPAP mask     We will write a letter of medical necessity for your ambulance rides     Visit time 60   min with > than 50% of the time spent in counseling on: hospital follow up, DM, seizures, med changes, HTN, mood       Marivel Barcenas, NP, APRN CNP  [facility]

## 2019-08-01 ENCOUNTER — APPOINTMENT (OUTPATIENT)
Dept: LAB | Facility: CLINIC | Age: 74
End: 2019-08-01
Payer: MEDICARE

## 2019-08-01 RX ORDER — GABAPENTIN 300 MG/1
600 CAPSULE ORAL
Qty: 248 CAPSULE | Refills: 3 | Status: ON HOLD | COMMUNITY
Start: 2019-07-31 | End: 2019-08-09

## 2019-08-02 ENCOUNTER — TELEPHONE (OUTPATIENT)
Dept: FAMILY MEDICINE | Facility: CLINIC | Age: 74
End: 2019-08-02

## 2019-08-02 DIAGNOSIS — G47.33 OBSTRUCTIVE SLEEP APNEA (ADULT) (PEDIATRIC): Primary | ICD-10-CM

## 2019-08-02 NOTE — TELEPHONE ENCOUNTER
"Reason for Call:  Cpap Coverage    Detailed comments: [facility] called stating that the pt's insurance won't cover the pt's Cpap unless the providers note specifically states the following: \"The pt is compliant with consistent and proper use of her Cpap that she benefits from\".     Phone Number: Brionna mendenhall/ [facility] tel: [phone] (okay to leave a detailed message)    Call taken on 8/2/2019 at 11:34 AM by Delma Flores      "

## 2019-08-02 NOTE — TELEPHONE ENCOUNTER
Returned call to Brionna with [facility]. Per Brionna, patient's office visit note needs to be updated to say the following: The pt is compliant with consistent and proper use of her Cpap that she benefits from.     Office visit note can be addended under the assessment and plan section    Please advise    Thank You!  Della Osborne RN  Triage Nurse

## 2019-08-05 ENCOUNTER — TELEPHONE (OUTPATIENT)
Dept: TRANSPLANT | Facility: CLINIC | Age: 74
End: 2019-08-05

## 2019-08-05 NOTE — TELEPHONE ENCOUNTER
RETURNED CALL:  After multiple attempts, home care nurse unable to access vein for phlebotomy, so unable to do the monthly labs as ordered. They will attempt blood draw again next week.

## 2019-08-05 NOTE — TELEPHONE ENCOUNTER
Call from N- pt is a hard stick to get labs  They have sent out different staff to get labs drawn  Still unable to get any  They want to review with you-on what they should do   Pt is bed bound and does not get up in the wheel chair

## 2019-08-06 ENCOUNTER — HOSPITAL ENCOUNTER (INPATIENT)
Facility: CLINIC | Age: 74
LOS: 3 days | Discharge: HOME HEALTH | DRG: 698 | End: 2019-08-09
Attending: EMERGENCY MEDICINE | Admitting: INTERNAL MEDICINE
Payer: MEDICARE

## 2019-08-06 ENCOUNTER — APPOINTMENT (OUTPATIENT)
Dept: CT IMAGING | Facility: CLINIC | Age: 74
DRG: 698 | End: 2019-08-06
Attending: EMERGENCY MEDICINE
Payer: MEDICARE

## 2019-08-06 DIAGNOSIS — E11.42 TYPE 2 DIABETES MELLITUS WITH DIABETIC POLYNEUROPATHY, WITH LONG-TERM CURRENT USE OF INSULIN (H): ICD-10-CM

## 2019-08-06 DIAGNOSIS — Z76.89 HEALTH CARE HOME: ICD-10-CM

## 2019-08-06 DIAGNOSIS — N39.0 URINARY TRACT INFECTION ASSOCIATED WITH INDWELLING URETHRAL CATHETER, SUBSEQUENT ENCOUNTER: ICD-10-CM

## 2019-08-06 DIAGNOSIS — M54.9 BACK PAIN, UNSPECIFIED BACK LOCATION, UNSPECIFIED BACK PAIN LATERALITY, UNSPECIFIED CHRONICITY: ICD-10-CM

## 2019-08-06 DIAGNOSIS — T83.511D URINARY TRACT INFECTION ASSOCIATED WITH INDWELLING URETHRAL CATHETER, SUBSEQUENT ENCOUNTER: ICD-10-CM

## 2019-08-06 DIAGNOSIS — T83.511A URINARY TRACT INFECTION ASSOCIATED WITH CATHETERIZATION OF URINARY TRACT, UNSPECIFIED INDWELLING URINARY CATHETER TYPE, INITIAL ENCOUNTER (H): Primary | ICD-10-CM

## 2019-08-06 DIAGNOSIS — Z79.4 TYPE 2 DIABETES MELLITUS WITH DIABETIC POLYNEUROPATHY, WITH LONG-TERM CURRENT USE OF INSULIN (H): ICD-10-CM

## 2019-08-06 DIAGNOSIS — R41.82 ALTERED MENTAL STATUS, UNSPECIFIED ALTERED MENTAL STATUS TYPE: ICD-10-CM

## 2019-08-06 DIAGNOSIS — N39.0 URINARY TRACT INFECTION ASSOCIATED WITH CATHETERIZATION OF URINARY TRACT, UNSPECIFIED INDWELLING URINARY CATHETER TYPE, INITIAL ENCOUNTER (H): Primary | ICD-10-CM

## 2019-08-06 DIAGNOSIS — G40.909 SEIZURE DISORDER (H): ICD-10-CM

## 2019-08-06 LAB
ALBUMIN UR-MCNC: 100 MG/DL
ANION GAP SERPL CALCULATED.3IONS-SCNC: 7 MMOL/L (ref 3–14)
APPEARANCE UR: ABNORMAL
BACTERIA #/AREA URNS HPF: ABNORMAL /HPF
BASOPHILS # BLD AUTO: 0 10E9/L (ref 0–0.2)
BASOPHILS NFR BLD AUTO: 0.1 %
BILIRUB UR QL STRIP: NEGATIVE
BUN SERPL-MCNC: 57 MG/DL (ref 7–30)
CALCIUM SERPL-MCNC: 8.5 MG/DL (ref 8.5–10.1)
CHLORIDE SERPL-SCNC: 111 MMOL/L (ref 94–109)
CO2 BLDCOV-SCNC: 22 MMOL/L (ref 21–28)
CO2 SERPL-SCNC: 23 MMOL/L (ref 20–32)
COLOR UR AUTO: ABNORMAL
CREAT SERPL-MCNC: 2.08 MG/DL (ref 0.52–1.04)
DIFFERENTIAL METHOD BLD: ABNORMAL
EOSINOPHIL # BLD AUTO: 0.2 10E9/L (ref 0–0.7)
EOSINOPHIL NFR BLD AUTO: 2.1 %
ERYTHROCYTE [DISTWIDTH] IN BLOOD BY AUTOMATED COUNT: 15.1 % (ref 10–15)
GFR SERPL CREATININE-BSD FRML MDRD: 23 ML/MIN/{1.73_M2}
GLUCOSE BLDC GLUCOMTR-MCNC: 112 MG/DL (ref 70–99)
GLUCOSE SERPL-MCNC: 132 MG/DL (ref 70–99)
GLUCOSE UR STRIP-MCNC: 70 MG/DL
HCT VFR BLD AUTO: 32.8 % (ref 35–47)
HGB BLD-MCNC: 10.1 G/DL (ref 11.7–15.7)
HGB UR QL STRIP: ABNORMAL
IMM GRANULOCYTES # BLD: 0 10E9/L (ref 0–0.4)
IMM GRANULOCYTES NFR BLD: 0.3 %
KETONES UR STRIP-MCNC: NEGATIVE MG/DL
LACTATE BLD-SCNC: 0.8 MMOL/L (ref 0.7–2.1)
LEUKOCYTE ESTERASE UR QL STRIP: ABNORMAL
LYMPHOCYTES # BLD AUTO: 1.6 10E9/L (ref 0.8–5.3)
LYMPHOCYTES NFR BLD AUTO: 14.5 %
MCH RBC QN AUTO: 28.9 PG (ref 26.5–33)
MCHC RBC AUTO-ENTMCNC: 30.8 G/DL (ref 31.5–36.5)
MCV RBC AUTO: 94 FL (ref 78–100)
MONOCYTES # BLD AUTO: 0.5 10E9/L (ref 0–1.3)
MONOCYTES NFR BLD AUTO: 5 %
NEUTROPHILS # BLD AUTO: 8.4 10E9/L (ref 1.6–8.3)
NEUTROPHILS NFR BLD AUTO: 78 %
NITRATE UR QL: NEGATIVE
PCO2 BLDV: 47 MM HG (ref 40–50)
PH BLDV: 7.28 PH (ref 7.32–7.43)
PH UR STRIP: 6 PH (ref 5–7)
PLATELET # BLD AUTO: 170 10E9/L (ref 150–450)
PO2 BLDV: 51 MM HG (ref 25–47)
POTASSIUM SERPL-SCNC: 4.7 MMOL/L (ref 3.4–5.3)
RBC # BLD AUTO: 3.49 10E12/L (ref 3.8–5.2)
RBC #/AREA URNS AUTO: 30 /HPF (ref 0–2)
SAO2 % BLDV FROM PO2: 80 %
SODIUM SERPL-SCNC: 141 MMOL/L (ref 133–144)
SOURCE: ABNORMAL
SP GR UR STRIP: 1.01 (ref 1–1.03)
UROBILINOGEN UR STRIP-MCNC: NORMAL MG/DL (ref 0–2)
WBC # BLD AUTO: 10.7 10E9/L (ref 4–11)
WBC #/AREA URNS AUTO: >182 /HPF (ref 0–5)
WBC CLUMPS #/AREA URNS HPF: PRESENT /HPF
YEAST #/AREA URNS HPF: ABNORMAL /HPF

## 2019-08-06 PROCEDURE — 70450 CT HEAD/BRAIN W/O DYE: CPT

## 2019-08-06 PROCEDURE — 25800030 ZZH RX IP 258 OP 636: Performed by: INTERNAL MEDICINE

## 2019-08-06 PROCEDURE — 87086 URINE CULTURE/COLONY COUNT: CPT | Performed by: EMERGENCY MEDICINE

## 2019-08-06 PROCEDURE — 85025 COMPLETE CBC W/AUTO DIFF WBC: CPT | Performed by: EMERGENCY MEDICINE

## 2019-08-06 PROCEDURE — 87088 URINE BACTERIA CULTURE: CPT | Performed by: EMERGENCY MEDICINE

## 2019-08-06 PROCEDURE — 25000128 H RX IP 250 OP 636: Performed by: EMERGENCY MEDICINE

## 2019-08-06 PROCEDURE — 83605 ASSAY OF LACTIC ACID: CPT

## 2019-08-06 PROCEDURE — 25000128 H RX IP 250 OP 636: Performed by: INTERNAL MEDICINE

## 2019-08-06 PROCEDURE — 87040 BLOOD CULTURE FOR BACTERIA: CPT | Performed by: EMERGENCY MEDICINE

## 2019-08-06 PROCEDURE — 99223 1ST HOSP IP/OBS HIGH 75: CPT | Mod: AI | Performed by: INTERNAL MEDICINE

## 2019-08-06 PROCEDURE — 87106 FUNGI IDENTIFICATION YEAST: CPT | Performed by: EMERGENCY MEDICINE

## 2019-08-06 PROCEDURE — 25000132 ZZH RX MED GY IP 250 OP 250 PS 637: Mod: GY | Performed by: INTERNAL MEDICINE

## 2019-08-06 PROCEDURE — 99285 EMERGENCY DEPT VISIT HI MDM: CPT | Mod: 25

## 2019-08-06 PROCEDURE — 81001 URINALYSIS AUTO W/SCOPE: CPT | Performed by: EMERGENCY MEDICINE

## 2019-08-06 PROCEDURE — 00000146 ZZHCL STATISTIC GLUCOSE BY METER IP

## 2019-08-06 PROCEDURE — 12000000 ZZH R&B MED SURG/OB

## 2019-08-06 PROCEDURE — 96365 THER/PROPH/DIAG IV INF INIT: CPT

## 2019-08-06 PROCEDURE — 82803 BLOOD GASES ANY COMBINATION: CPT

## 2019-08-06 PROCEDURE — 87186 SC STD MICRODIL/AGAR DIL: CPT | Performed by: EMERGENCY MEDICINE

## 2019-08-06 PROCEDURE — 80048 BASIC METABOLIC PNL TOTAL CA: CPT | Performed by: EMERGENCY MEDICINE

## 2019-08-06 RX ORDER — NICOTINE POLACRILEX 4 MG
15-30 LOZENGE BUCCAL
Status: DISCONTINUED | OUTPATIENT
Start: 2019-08-06 | End: 2019-08-09 | Stop reason: HOSPADM

## 2019-08-06 RX ORDER — ONDANSETRON 2 MG/ML
4 INJECTION INTRAMUSCULAR; INTRAVENOUS EVERY 6 HOURS PRN
Status: DISCONTINUED | OUTPATIENT
Start: 2019-08-06 | End: 2019-08-09 | Stop reason: HOSPADM

## 2019-08-06 RX ORDER — FAMOTIDINE 20 MG/1
20 TABLET, FILM COATED ORAL AT BEDTIME
Status: DISCONTINUED | OUTPATIENT
Start: 2019-08-06 | End: 2019-08-09 | Stop reason: HOSPADM

## 2019-08-06 RX ORDER — CALCIUM CARBONATE 500 MG/1
1000 TABLET, CHEWABLE ORAL 4 TIMES DAILY PRN
Status: DISCONTINUED | OUTPATIENT
Start: 2019-08-06 | End: 2019-08-09 | Stop reason: HOSPADM

## 2019-08-06 RX ORDER — SODIUM CHLORIDE 9 MG/ML
INJECTION, SOLUTION INTRAVENOUS CONTINUOUS
Status: DISCONTINUED | OUTPATIENT
Start: 2019-08-06 | End: 2019-08-09

## 2019-08-06 RX ORDER — DEXTROSE MONOHYDRATE 25 G/50ML
25-50 INJECTION, SOLUTION INTRAVENOUS
Status: DISCONTINUED | OUTPATIENT
Start: 2019-08-06 | End: 2019-08-09 | Stop reason: HOSPADM

## 2019-08-06 RX ORDER — POLYETHYLENE GLYCOL 3350 17 G/17G
17 POWDER, FOR SOLUTION ORAL DAILY PRN
Status: DISCONTINUED | OUTPATIENT
Start: 2019-08-06 | End: 2019-08-09 | Stop reason: HOSPADM

## 2019-08-06 RX ORDER — PRAVASTATIN SODIUM 10 MG
10 TABLET ORAL DAILY
Status: DISCONTINUED | OUTPATIENT
Start: 2019-08-07 | End: 2019-08-09 | Stop reason: HOSPADM

## 2019-08-06 RX ORDER — BISACODYL 10 MG
10 SUPPOSITORY, RECTAL RECTAL DAILY PRN
Status: DISCONTINUED | OUTPATIENT
Start: 2019-08-06 | End: 2019-08-09 | Stop reason: HOSPADM

## 2019-08-06 RX ORDER — LEVETIRACETAM 250 MG/1
250 TABLET ORAL 2 TIMES DAILY
Status: DISCONTINUED | OUTPATIENT
Start: 2019-08-06 | End: 2019-08-08

## 2019-08-06 RX ORDER — AMOXICILLIN 250 MG
2 CAPSULE ORAL 2 TIMES DAILY PRN
Status: DISCONTINUED | OUTPATIENT
Start: 2019-08-06 | End: 2019-08-09 | Stop reason: HOSPADM

## 2019-08-06 RX ORDER — BACLOFEN 10 MG/1
10 TABLET ORAL 2 TIMES DAILY PRN
Status: DISCONTINUED | OUTPATIENT
Start: 2019-08-06 | End: 2019-08-09 | Stop reason: HOSPADM

## 2019-08-06 RX ORDER — AMLODIPINE BESYLATE 10 MG/1
10 TABLET ORAL DAILY
Status: DISCONTINUED | OUTPATIENT
Start: 2019-08-07 | End: 2019-08-09 | Stop reason: HOSPADM

## 2019-08-06 RX ORDER — GABAPENTIN 300 MG/1
600 CAPSULE ORAL
Status: DISCONTINUED | OUTPATIENT
Start: 2019-08-07 | End: 2019-08-07

## 2019-08-06 RX ORDER — PRAZOSIN HYDROCHLORIDE 1 MG/1
1 CAPSULE ORAL AT BEDTIME
Status: DISCONTINUED | OUTPATIENT
Start: 2019-08-06 | End: 2019-08-09 | Stop reason: HOSPADM

## 2019-08-06 RX ORDER — LEVOTHYROXINE SODIUM 50 UG/1
50 TABLET ORAL
Status: DISCONTINUED | OUTPATIENT
Start: 2019-08-07 | End: 2019-08-09 | Stop reason: HOSPADM

## 2019-08-06 RX ORDER — NALOXONE HYDROCHLORIDE 0.4 MG/ML
.1-.4 INJECTION, SOLUTION INTRAMUSCULAR; INTRAVENOUS; SUBCUTANEOUS
Status: DISCONTINUED | OUTPATIENT
Start: 2019-08-06 | End: 2019-08-09 | Stop reason: HOSPADM

## 2019-08-06 RX ORDER — TACROLIMUS 1 MG/1
2 CAPSULE ORAL 2 TIMES DAILY
Status: DISCONTINUED | OUTPATIENT
Start: 2019-08-06 | End: 2019-08-06

## 2019-08-06 RX ORDER — DIPHENOXYLATE HCL/ATROPINE 2.5-.025MG
1-2 TABLET ORAL 4 TIMES DAILY PRN
Status: DISCONTINUED | OUTPATIENT
Start: 2019-08-06 | End: 2019-08-09 | Stop reason: HOSPADM

## 2019-08-06 RX ORDER — CEFTRIAXONE 2 G/1
2 INJECTION, POWDER, FOR SOLUTION INTRAMUSCULAR; INTRAVENOUS EVERY 24 HOURS
Status: DISCONTINUED | OUTPATIENT
Start: 2019-08-07 | End: 2019-08-08

## 2019-08-06 RX ORDER — PROCHLORPERAZINE 25 MG
12.5 SUPPOSITORY, RECTAL RECTAL EVERY 12 HOURS PRN
Status: DISCONTINUED | OUTPATIENT
Start: 2019-08-06 | End: 2019-08-09 | Stop reason: HOSPADM

## 2019-08-06 RX ORDER — GABAPENTIN 300 MG/1
300 CAPSULE ORAL
Status: ON HOLD | COMMUNITY
End: 2019-08-09

## 2019-08-06 RX ORDER — ACETAMINOPHEN 325 MG/1
325-650 TABLET ORAL EVERY 4 HOURS PRN
Status: DISCONTINUED | OUTPATIENT
Start: 2019-08-06 | End: 2019-08-09 | Stop reason: HOSPADM

## 2019-08-06 RX ORDER — SIMETHICONE 80 MG
80 TABLET,CHEWABLE ORAL 4 TIMES DAILY PRN
Status: DISCONTINUED | OUTPATIENT
Start: 2019-08-06 | End: 2019-08-09 | Stop reason: HOSPADM

## 2019-08-06 RX ORDER — CHOLECALCIFEROL (VITAMIN D3) 50 MCG
2000 TABLET ORAL 2 TIMES DAILY
COMMUNITY

## 2019-08-06 RX ORDER — TACROLIMUS 1 MG/1
2 CAPSULE, GELATIN COATED ORAL
Status: DISCONTINUED | OUTPATIENT
Start: 2019-08-06 | End: 2019-08-09 | Stop reason: HOSPADM

## 2019-08-06 RX ORDER — ONDANSETRON 4 MG/1
4 TABLET, ORALLY DISINTEGRATING ORAL EVERY 6 HOURS PRN
Status: DISCONTINUED | OUTPATIENT
Start: 2019-08-06 | End: 2019-08-09 | Stop reason: HOSPADM

## 2019-08-06 RX ORDER — ASPIRIN 81 MG/1
81 TABLET ORAL DAILY
Status: DISCONTINUED | OUTPATIENT
Start: 2019-08-07 | End: 2019-08-09 | Stop reason: HOSPADM

## 2019-08-06 RX ORDER — LACTOBACILLUS RHAMNOSUS GG 10B CELL
1 CAPSULE ORAL 2 TIMES DAILY
Status: DISCONTINUED | OUTPATIENT
Start: 2019-08-06 | End: 2019-08-09 | Stop reason: HOSPADM

## 2019-08-06 RX ORDER — PREDNISONE 5 MG/1
5 TABLET ORAL DAILY
Status: DISCONTINUED | OUTPATIENT
Start: 2019-08-07 | End: 2019-08-09 | Stop reason: HOSPADM

## 2019-08-06 RX ORDER — CEFTRIAXONE 2 G/1
2 INJECTION, POWDER, FOR SOLUTION INTRAMUSCULAR; INTRAVENOUS ONCE
Status: COMPLETED | OUTPATIENT
Start: 2019-08-06 | End: 2019-08-06

## 2019-08-06 RX ORDER — LORATADINE 10 MG/1
10 TABLET ORAL DAILY
Status: DISCONTINUED | OUTPATIENT
Start: 2019-08-07 | End: 2019-08-09 | Stop reason: HOSPADM

## 2019-08-06 RX ORDER — ONDANSETRON 4 MG/1
4 TABLET, ORALLY DISINTEGRATING ORAL DAILY PRN
Status: DISCONTINUED | OUTPATIENT
Start: 2019-08-06 | End: 2019-08-06

## 2019-08-06 RX ORDER — PROCHLORPERAZINE MALEATE 5 MG
5 TABLET ORAL EVERY 6 HOURS PRN
Status: DISCONTINUED | OUTPATIENT
Start: 2019-08-06 | End: 2019-08-09 | Stop reason: HOSPADM

## 2019-08-06 RX ORDER — AMOXICILLIN 250 MG
1 CAPSULE ORAL 2 TIMES DAILY PRN
Status: DISCONTINUED | OUTPATIENT
Start: 2019-08-06 | End: 2019-08-09 | Stop reason: HOSPADM

## 2019-08-06 RX ADMIN — ONDANSETRON 4 MG: 2 INJECTION INTRAMUSCULAR; INTRAVENOUS at 21:33

## 2019-08-06 RX ADMIN — CEFTRIAXONE SODIUM 2 G: 2 INJECTION, POWDER, FOR SOLUTION INTRAMUSCULAR; INTRAVENOUS at 17:03

## 2019-08-06 RX ADMIN — FAMOTIDINE 20 MG: 20 TABLET, FILM COATED ORAL at 21:54

## 2019-08-06 RX ADMIN — SODIUM CHLORIDE: 9 INJECTION, SOLUTION INTRAVENOUS at 21:37

## 2019-08-06 RX ADMIN — ACETAMINOPHEN 650 MG: 325 TABLET, FILM COATED ORAL at 21:33

## 2019-08-06 RX ADMIN — LABETALOL HYDROCHLORIDE 150 MG: 100 TABLET, FILM COATED ORAL at 22:48

## 2019-08-06 RX ADMIN — Medication 1 CAPSULE: at 21:34

## 2019-08-06 RX ADMIN — PRAZOSIN HYDROCHLORIDE 1 MG: 1 CAPSULE ORAL at 22:49

## 2019-08-06 RX ADMIN — LEVETIRACETAM 250 MG: 250 TABLET, FILM COATED ORAL at 22:48

## 2019-08-06 ASSESSMENT — ACTIVITIES OF DAILY LIVING (ADL)
RETIRED_EATING: 2-->ASSISTIVE PERSON
BATHING: 4-->COMPLETELY DEPENDENT
TOILETING: 4-->COMPLETELY DEPENDENT
RETIRED_COMMUNICATION: 0-->UNDERSTANDS/COMMUNICATES WITHOUT DIFFICULTY
COGNITION: 0 - NO COGNITION ISSUES REPORTED
SWALLOWING: 0-->SWALLOWS FOODS/LIQUIDS WITHOUT DIFFICULTY
WHICH_OF_THE_ABOVE_FUNCTIONAL_RISKS_HAD_A_RECENT_ONSET_OR_CHANGE?: COGNITION
FALL_HISTORY_WITHIN_LAST_SIX_MONTHS: NO
DRESS: 4-->COMPLETELY DEPENDENT
AMBULATION: 4-->COMPLETELY DEPENDENT
TRANSFERRING: 4-->COMPLETELY DEPENDENT

## 2019-08-06 ASSESSMENT — ENCOUNTER SYMPTOMS
DYSURIA: 1
WEAKNESS: 1
CONFUSION: 1

## 2019-08-06 NOTE — H&P
History and Physical     Gem Jade MRN# [id number]   YOB: 1945 Age: 73 year old      Date of Admission:  8/6/2019    Primary care provider: Marivel Barcenas          Assessment and Plan:   This is a  73 year old female admitted with catheter associated urinary tract infection.    1.  Catheter associated urinary tract infection.  Blood and urine cultures been sent.  Patient's been started on ceftriaxone.  Catheter should be replaced.  Continue baclofen for bladder spasms.    2.  Metabolic encephalopathy.  Likely due to infection.  CT scan demonstrates mild to moderate cerebral atrophy without acute changes.  Monitor patient overnight.  I suspect patient's mentation will clear after treatment of the urinary tract infection.    3.  Acute kidney injury.  Will order ultrasound to rule out obstruction.  Baseline creatinine approximately 1.5-1.7.  Fluid resuscitate overnight and recheck BMP in the morning.    4.  Renal transplant.  Continue tacrolimus and prednisone.  Will check tacrolimus level in the morning.    5.  Diabetes mellitus type 2.  Reduce glargine to 20 units at bedtime.  Place patient on insulin sliding scale.  Check hemoglobin A1c.    6.  Hypertension.  Blood pressure well controlled on amlodipine, labetalol, and prazosin.    7.  Hypothyroidism.  Continue levothyroxine.    8.  Chronic anemia.  Hemoglobin is at baseline.    9.  Chronic pain.  Continue acetaminophen, gabapentin, and as needed tramadol.    10.  Hyperlipidemia.  Continue pravastatin.    11.  Seizure disorder.  Continue Keppra.  The patient's Keppra dose was reduced by neurology in July following an episode of unconsciousness.          Chief Complaint:   This patient is a 73 year old female who presents with mental status changes    History is obtained from the patient         History of Present Illness:   The patient was brought to the emergency department by her  after approximately day and a half of  increased lethargy and confusion.  She has history of previous urinary tract infections, most recently hospitalized at [facility] from July 15-21.  Urine cultures grew MSSA.  She was initially treated with Invanz and was discharged on a course of Keflex.  She completed her antibiotics on July 29.  Patient underwent a catheter exchange on Saturday.  She felt well until yesterday.  She denies fevers or chills, but her temperature in the emergency department is 99.2.  She denies nausea vomiting and diarrhea, and states that she has been eating and drinking normally.  She denies headache, nasal congestion, runny nose, sore throat, wheezing, or cough.  She denies focal numbness or weakness.  She underwent amputation of her right 1st toe on July 19, 2018, for osteomyelitis.  Patient denies rash, muscle ache, or joint ache.  Patient denies chest pain or palpitations.  Patient denies focal numbness or weakness.  She is bedbound at baseline due to physical deconditioning.             Past Medical History:     Past Medical History:   Diagnosis Date     Background diabetic retinopathy(362.01)     extensive diabetic retinopathy, s/p multiple laser treatments     Diabetic gastroparesis (H)     followed by [facility] (Dr. 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)      Urinary tract infection, site not specified     Chronic UTIs     UTI (urinary tract infection) due to urinary indwelling catheter (H)              Past Surgical History:     Past Surgical  History:   Procedure Laterality Date     AMPUTATE TOE(S) Right 2019    Procedure: RIGHT FIRST TOE AMPUTATION;  Surgeon: Armen Jefferson DPM;  Location: [facility] OR     C NONSPECIFIC PROCEDURE  10/99    kidney transplant     C NONSPECIFIC PROCEDURE      MRI head, lumbar spine, pelvis     C TOTAL KNEE ARTHROPLASTY  3/03    Knee Replacement, Total right, post op PE     CHOLECYSTECTOMY       HC LEFT HEART CATHETERIZATION      Cardiac Cath, Left Heart, Negative  (@ [facility])     HC LEFT HEART CATHETERIZATION      normal coronary angiogram at [facility], had mild troponin rise (0.71)     HC REMV CATARACT EXTRACAP,INSERT LENS      bilateral cataract repaired, left eye              Social History:     Social History     Tobacco Use     Smoking status: Former Smoker     Years: 17.00     Last attempt to quit: 1987     Years since quittin.2     Smokeless tobacco: Never Used   Substance Use Topics     Alcohol use: No             Family History:   Family history reviewed and is noncontributory other than noted in HPI          Immunizations:     Immunization History   Administered Date(s) Administered     FLU 6-35 months 2011     Flu, Unspecified 10/13/1997     HepB 2003     HepB-Adult 2003     Influenza (H1N1) 2009     Influenza (High Dose) 3 valent vaccine 2015, 10/07/2016     Influenza (IIV3) PF 2000, 2002, 2003, 10/22/2004, 2005, 2006, 2007, 10/21/2008, 10/06/2009, 2010, 2011     Influenza Vaccine IM 3yrs+ 4 Valent IIV4 10/16/2013     Pneumo Conj 13-V (2010&after) 2015     Pneumococcal 23 valent 2013     TDAP Vaccine (Adacel) 2013     Zoster vaccine, live 2013            Allergies:     Allergies   Allergen Reactions     Atorvastatin Calcium      myalgias, muscle aches     Codeine Nausea and Vomiting     Darvocet [Propoxyphene N-Apap] Nausea and Vomiting     Hydromorphone      Levofloxacin Other (See  Comments) and Diarrhea     hallucinations     Morphine      Nausea and vomiting     Niaspan [Niacin] GI Disturbance     Flushing even at low dose, GI upset     Percocet [Oxycodone-Acetaminophen]      Vomiting after taking med couple of times     Vicodin [Hydrocodone-Acetaminophen] Nausea and Vomiting             Medications:     Prior to Admission medications    Medication Sig Start Date End Date Taking? Authorizing Provider   ACETAMINOPHEN PO Take 325-650 mg by mouth every 4 hours as needed.    Unknown, Entered By History   amLODIPine (NORVASC) 5 MG tablet Take 2 tablets (10 mg) by mouth daily 5/3/19   Marivel Barcenas APRN CNP   aspirin 81 MG tablet Take 81 mg by mouth daily    Unknown, Entered By History   baclofen (LIORESAL) 10 MG tablet Take 1 tablet (10 mg) by mouth 2 times daily as needed for other (Bladder Spasms) 1/23/19   Marivel Barcenas APRN CNP   blood glucose (ACCU-CHEK COMPACT PLUS) test strip USE TO TEST BLOOD SUGAR THREE TIMES A DAY 1/30/19   Marivel Barcenas APRN CNP   blood glucose (NO BRAND SPECIFIED) lancets standard Use to test blood sugar 3 times daily or as directed. (Accuchek guide) 7/23/19   Marivel Barcenas APRN CNP   blood glucose (NO BRAND SPECIFIED) test strip Use to test blood sugar 3 times daily or as directed. (accuchek guide) 7/23/19   Marivel Barcenas APRN CNP   blood glucose (NO BRAND SPECIFIED) test strip Use to test blood sugar 3 times daily or as directed. 7/22/19   Marivel Barcenas APRN CNP   blood glucose calibration (NO BRAND SPECIFIED) solution Use to calibrate blood glucose monitor as needed as directed.(accuchek guide) 7/23/19   Marivel Barcenas APRN CNP   blood glucose monitoring (NO BRAND SPECIFIED) meter device kit Use to test blood sugar three times daily or as directed. 7/2/19   Marivel Barcenas APRN CNP   COMFORT EZ PEN NEEDLES 31G X 6 MM miscellaneous USE 4 DAILY OR AS DIRECTED  3/27/19   Marivel Barcenas APRN CNP   Cranberry 400 MG TABS Take 400 mg by mouth 2 times daily    Unknown, Entered By History   cyanocobalamin (CYANOCOBALAMIN) 1000 MCG/ML injection Inject 1 mL (1,000 mcg) into the muscle every 2 months 6/27/19   Marivel Barcenas APRN CNP   diphenoxylate-atropine (LOMOTIL) 2.5-0.025 MG tablet Take 1-2 tablets by mouth 4 times daily as needed for diarrhea 7/9/19   Pat Tinajero APRN CNP   famotidine (PEPCID) 20 MG tablet Take 1 tablet (20 mg) by mouth 2 times daily 3/30/14   Marivel Barcenas APRN CNP   gabapentin (NEURONTIN) 300 MG capsule Take 2 capsules (600 mg) by mouth 2 times daily May take one additional capsule with severe pain at night, but not daily 7/31/19   Marivel Barcenas APRN CNP   insulin aspart (NOVOLOG FLEXPEN) 100 UNIT/ML pen Take 10 units with meals + sliding scale if eating. Sliding scale only at bedtime when not eating. Max 40 units per day  Sliding scale:   140-199 2u  200-249 4u  250-299 6u  300-349 8u  350-399 10u  Over 400 12u 5/7/19      insulin glargine (LANTUS SOLOSTAR PEN) 100 UNIT/ML pen Inject 30 Units Subcutaneous At Bedtime 5/7/19      labetalol (NORMODYNE) 300 MG tablet Take 0.5 tablets (150 mg) by mouth 2 times daily 6/14/19   Marivel Barcenas APRN CNP   Lactobacillus Rhamnosus, GG, (CULTURELL) capsule Take 1 capsule by mouth 2 times daily 5/31/16   She Siddiqui MD   levETIRAcetam (KEPPRA) 250 MG tablet Take 1 tablet (250 mg) by mouth 2 times daily 7/21/19   Janki Marin MD   levothyroxine (SYNTHROID/LEVOTHROID) 50 MCG tablet TAKE ONE TABLET BY MOUTH EVERY DAY 7/26/19   Marivel Barcenas APRN CNP   lidocaine (XYLOCAINE) 2 % topical gel Apply topically as needed for moderate pain 10 ML every 3 weeks 1/8/18   Marivel Barcenas APRN CNP   loratadine (CLARITIN) 10 MG tablet Take 1 tablet (10 mg) by mouth daily 7/26/19   Pat Tinajero, APRN CNP   menthol-zinc oxide  "(CALMOSEPTINE) 0.44-20.625 % OINT ointment Apply topically 4 times daily as needed for skin protection 5/3/19   Marivel Barcenas APRN CNP   ondansetron (ZOFRAN ODT) 4 MG ODT tab Take 1 tablet (4 mg) by mouth daily as needed for nausea 30 minutes before getting up in your wheelchair 6/6/18   Marivel Barcenas APRN CNP   order for DME Equipment being ordered: CPAP mask, [employer] FX, nasal pillows system, women's,   Catalog # [id number]    Please send to  DME 8/1/19   Marivel Barcenas APRN CNP   order for DME Equipment being ordered: Tegaderm 4x4   MICHELLE 6 months  Sacral wound 9/14/18   Marivel Barcenas APRN CNP   order for DME Equipment being ordered: [employer] gel-T cushion 20 inches wide & 18 inches deep    height 1.676 m (5' 6\"), weight 101.6 kg (223 lb 15.8 oz) 8/7/18   Marivel Barcenas APRN CNP   pravastatin (PRAVACHOL) 10 MG tablet Take 1 tablet (10 mg) by mouth daily 7/26/19   Pat Tinajero APRN CNP   prazosin (MINIPRESS) 1 MG capsule Take 2 capsules (2 mg) by mouth At Bedtime  Patient taking differently: Take 1-2 mg by mouth nightly as needed  6/14/19   Marivel Barcenas APRN CNP   predniSONE (DELTASONE) 5 MG tablet Take 1 tablet (5 mg) by mouth daily 7/25/19   Marivel Barcenas APRN CNP   simethicone (MYLICON) 125 MG CHEW Take 1 tablet (125 mg) by mouth as needed for intestinal gas 6/6/16      tacrolimus (GENERIC EQUIVALENT) 1 MG capsule Take 2 capsules (2 mg) by mouth 2 times daily 11/8/18   Marcus Ley MD   traMADol (ULTRAM) 50 MG tablet Take 1-2 tablets every 6 hours for post operative pain. 7/29/19   Armen Jefferson DPM   VITAMIN D, CHOLECALCIFEROL, PO Take 4,000 Units by mouth 2 times daily    Unknown, Entered By History            Review of Systems:   The 10 point Review of Systems performed and is negative other than noted in the HPI           Physical Exam:   Vitals were reviewed  Temp: 99.2  F (37.3  C) Temp " src: Oral BP: (!) 147/63 Pulse: 58 Heart Rate: 63 Resp: 16 SpO2: 97 % O2 Device: None (Room air)      This is an obese debilitated somnolent female resting comfortably in bed, no acute distress  Head: atraumatic normocephalic, sclera noninjected and anicterric, oral mucosa moist without lesions or exudates.  Neck: supple without spinal abnormality  Chest: clear to auscultation bilaterally, without wheezes, rhonchi, or rales.  Cardiovascular: regular rate and rhythm, no murmurs, gallops, or rubs, no edema  Abdomen: bowel sounds normal, nontender and nondistended, no hepatosplenomegaly or masses.  Musculoskeletal: normal muscle mass and tone  Skin: no rashes  Lymph: no lymphadenopathy.  Neuro: cranial nerves II-XII intact, strength in all four extremities normal, reflexes normal, coordination normal.         Data:   Data reviewed today: I reviewed all medications, new labs and imaging results over the last 24 hours. I personally reviewed the head CT image(s) showing diffuse atrophy, no acute changes.    Recent Labs   Lab 08/06/19  1343   WBC 10.7   HGB 10.1*   MCV 94         POTASSIUM 4.7   CHLORIDE 111*   CO2 23   BUN 57*   CR 2.08*   ANIONGAP 7   ROGER 8.5   *     Recent Results (from the past 24 hour(s))   CT Head w/o Contrast    Narrative    CT SCAN OF THE HEAD WITHOUT CONTRAST   8/6/2019 3:47 PM     HISTORY: Altered mental status. Dizziness and giddiness.    TECHNIQUE: Axial images of the head and coronal reformations without  IV contrast material. Radiation dose for this scan was reduced using  automated exposure control, adjustment of the mA and/or kV according  to patient size, or iterative reconstruction technique.    COMPARISON: 7/15/2019.    FINDINGS: Mild to moderate cerebral atrophy is present. There are some  minimal patchy white matter changes in both hemispheres without mass  effect. There is no evidence for intracranial hemorrhage, mass effect,  acute infarct, or a skull fracture.       Impression    IMPRESSION: Chronic changes. No evidence for intracranial hemorrhage  or any acute process.    GIL TIAN MD

## 2019-08-06 NOTE — ED NOTES
Bed: ED06  Expected date:   Expected time:   Means of arrival:   Comments:  [facility] - 71 F lethargic ?UTI eta 131

## 2019-08-06 NOTE — PLAN OF CARE
RECEIVING UNIT ED HANDOFF REVIEW    ED Nurse Handoff Report was reviewed by: Aden Rockwell on August 6, 2019 at 6:26 PM

## 2019-08-06 NOTE — PHARMACY-ADMISSION MEDICATION HISTORY
Admission medication history interview status for the 8/6/2019  admission is complete. See EPIC admission navigator for prior to admission medications     Medication history source reliability:Good -     Actions taken by pharmacist (provider contacted, etc): Chart and SureScript review, called  for medication information. Patient recently discharged from [facility] 7/21/19.    Additional medication history information not noted on PTA med list :  - Per , Keppra is being tapered off. Current dose is 250 mg po BID.  - Patient finished Keflex from 7/21 discharge 7/30 per .    Medication reconciliation/reorder completed by provider prior to medication history? Yes    Time spent in this activity: 25 minutes    Prior to Admission medications    Medication Sig Last Dose Taking? Auth Provider   acetaminophen (TYLENOL) 325 MG tablet Take 325-650 mg by mouth every 4 hours as needed  prn Yes Unknown, Entered By History   amLODIPine (NORVASC) 5 MG tablet Take 2 tablets (10 mg) by mouth daily 8/5/2019 at Unknown time Yes Marivel Barcenas APRN CNP   aspirin 81 MG tablet Take 81 mg by mouth daily 8/5/2019 at Unknown time Yes Unknown, Entered By History   baclofen (LIORESAL) 10 MG tablet Take 1 tablet (10 mg) by mouth 2 times daily as needed for other (Bladder Spasms) prn Yes Marivel Barcenas APRN CNP   CRANBERRY PO Take 1 tablet by mouth 2 times daily  8/5/2019 at Unknown time Yes Unknown, Entered By History   cyanocobalamin (CYANOCOBALAMIN) 1000 MCG/ML injection Inject 1 mL (1,000 mcg) into the muscle every 2 months  Yes Marivel Barcenas APRN CNP   diphenoxylate-atropine (LOMOTIL) 2.5-0.025 MG tablet Take 1-2 tablets by mouth 4 times daily as needed for diarrhea prn Yes Pat Tinajero APRN CNP   famotidine (PEPCID) 20 MG tablet Take 1 tablet (20 mg) by mouth 2 times daily 8/5/2019 at Unknown time Yes Marivel Barcenas APRN CNP   gabapentin (NEURONTIN) 300 MG  capsule Take 300 mg by mouth nightly as needed (severe pain) prn Yes Unknown, Entered By History   gabapentin (NEURONTIN) 300 MG capsule Take 600 mg by mouth 2 times daily  8/5/2019 at pm Yes Marivel Barcenas APRN CNP   insulin aspart (NOVOLOG FLEXPEN) 100 UNIT/ML pen Take 10 units with meals + sliding scale if eating. Sliding scale only at bedtime when not eating. Max 40 units per day  Sliding scale:   140-199 2u  200-249 4u  250-299 6u  300-349 8u  350-399 10u  Over 400 12u 8/5/2019 at Unknown time Yes    insulin glargine (LANTUS SOLOSTAR PEN) 100 UNIT/ML pen Inject 30 Units Subcutaneous At Bedtime 8/5/2019 at pm Yes    labetalol (NORMODYNE) 300 MG tablet Take 0.5 tablets (150 mg) by mouth 2 times daily 8/5/2019 at Unknown time Yes Marivel Barcenas APRN CNP   Lactobacillus Rhamnosus, GG, (CULTURELL) capsule Take 1 capsule by mouth 2 times daily 8/5/2019 at Unknown time Yes She Siddiqui MD   levETIRAcetam (KEPPRA) 250 MG tablet Take 1 tablet (250 mg) by mouth 2 times daily 8/5/2019 at pm Yes Janki Marin MD   levothyroxine (SYNTHROID/LEVOTHROID) 50 MCG tablet TAKE ONE TABLET BY MOUTH EVERY DAY 8/5/2019 at am Yes Marivel Barcenas APRN CNP   lidocaine (XYLOCAINE) 2 % topical gel Apply topically as needed for moderate pain 10 ML every 3 weeks Past Month at Unknown time Yes Marivel Barcenas APRN CNP   loratadine (CLARITIN) 10 MG tablet Take 1 tablet (10 mg) by mouth daily 8/5/2019 at Unknown time Yes Pat Tinajero APRN CNP   menthol-zinc oxide (CALMOSEPTINE) 0.44-20.625 % OINT ointment Apply topically 4 times daily as needed for skin protection prn Yes Marivel Barcenas APRN CNP   ondansetron (ZOFRAN ODT) 4 MG ODT tab Take 1 tablet (4 mg) by mouth daily as needed for nausea 30 minutes before getting up in your wheelchair prn Yes Marivel Barcenas, APRN CNP   pravastatin (PRAVACHOL) 10 MG tablet Take 1 tablet (10 mg) by mouth daily 8/5/2019 at  Unknown time Yes Pat Tinajero APRN CNP   prazosin (MINIPRESS) 1 MG capsule Take 2 capsules (2 mg) by mouth At Bedtime  Patient taking differently: Take 1-2 mg by mouth nightly as needed  Past Month at prn Yes Marivel Barcenas APRN CNP   predniSONE (DELTASONE) 5 MG tablet Take 1 tablet (5 mg) by mouth daily 8/5/2019 at Unknown time Yes Marivel Barcenas APRN CNP   simethicone (MYLICON) 125 MG CHEW Take 1 tablet (125 mg) by mouth as needed for intestinal gas prn Yes    tacrolimus (GENERIC EQUIVALENT) 1 MG capsule Take 2 capsules (2 mg) by mouth 2 times daily 8/5/2019 at Unknown time Yes Marcus Ley MD   traMADol (ULTRAM) 50 MG tablet Take 1-2 tablets every 6 hours for post operative pain. prn Yes Armen Jefferson DPM   vitamin D3 (CHOLECALCIFEROL) 2000 units (50 mcg) tablet Take 4,000 Units by mouth 2 times daily 8/5/2019 at Unknown time Yes Unknown, Entered By History   blood glucose (ACCU-CHEK COMPACT PLUS) test strip USE TO TEST BLOOD SUGAR THREE TIMES A DAY   Marivel Barcenas APRN CNP   blood glucose (NO BRAND SPECIFIED) lancets standard Use to test blood sugar 3 times daily or as directed. (Accuchek guide)   Marivel Barcenas APRN CNP   blood glucose (NO BRAND SPECIFIED) test strip Use to test blood sugar 3 times daily or as directed. (accuchek guide)   Marivel Barcenas APRN CNP   blood glucose (NO BRAND SPECIFIED) test strip Use to test blood sugar 3 times daily or as directed.   Marivel Barcenas APRN CNP   blood glucose calibration (NO BRAND SPECIFIED) solution Use to calibrate blood glucose monitor as needed as directed.(accuchek guide)   Marivel Barcenas APRN CNP   blood glucose monitoring (NO BRAND SPECIFIED) meter device kit Use to test blood sugar three times daily or as directed.   Marivel Barcenas APRN CNP   COMFORT EZ PEN NEEDLES 31G X 6 MM miscellaneous USE 4 DAILY OR AS DIRECTED   Swapna  "ANTHONY Farris CNP   order for DME Equipment being ordered: CPAP mask, [employer] FX, nasal pillows system, women's,   Catalog # [id number]    Please send to  Marivel Mcpherson APRN CNP   order for DME Equipment being ordered: Tegaderm 4x4   MICHELLE 6 months  Sacral wound   Marivel Barcenas APRN CNP   order for DME Equipment being ordered: [employer] gel-T cushion 20 inches wide & 18 inches deep    height 1.676 m (5' 6\"), weight 101.6 kg (223 lb 15.8 oz)   Marivel Barcenas APRN CNP       "

## 2019-08-06 NOTE — ED PROVIDER NOTES
"  History     Chief Complaint:  Generalized Weakness; Confusion     The history is provided by the patient. The history is limited by the condition of the patient.      Gem Jade is a type II diabetic 73 year old female status post kidney transplant with history notable for CKD III who presents with concerns for increased confusion and weakness in the setting of possible UTI. She states she feels \"awful.\" She denies any pain, though reports brief dysuria earlier today, and states she finished a course of antibiotics yesterday. Per her  on the phone, patient's altered mental state appears similar to prior stints of UTI. Patient had her rico changed two days ago and her last course of antibiotics was in July.     Allergies:  Atorvastatin calcium  Codeine  Darvcocet  Hydromorphone  Levofloxacin  Morphine  Niaspan  Percocet  Vicodin     Medications:    Norvasc  Baby Asprin  Lioresal  Lomotil  Pepcid  Neurontin  Insulin  Lantus  Labetalol  Culturell  Keppra  Synthroid  Pravachol  Ultram    Past Medical History:    Diabetic gastroparesis  Hyponatremia  Hyperlipidemia  Osteopenia  Obesity  Type II DM  Clostridium difficile colitis  UTI  CKD III    Past Surgical History:    Cholecystectomy   Heart catheterization  Bilateral cataract repair  Kidney transplant     Family History:    DM  Parkinsonism  Heart disease    Social History:  Former Smoker  Alcohol Use: Negative  Marital Status:       Review of Systems   Unable to perform ROS: Mental status change   Genitourinary: Positive for dysuria.   Neurological: Positive for weakness.   Psychiatric/Behavioral: Positive for confusion.     Physical Exam   Patient Vitals for the past 24 hrs:   BP Temp Temp src Pulse Heart Rate Resp SpO2   08/06/19 1500 (!) 152/69 -- -- 59 -- -- --   08/06/19 1400 122/62 -- -- 62 -- -- 98 %   08/06/19 1339 131/63 99.2  F (37.3  C) Oral -- 63 16 94 %     Physical Exam  Vitals: reviewed by me  General: Pt seen on [facility], " pleasant, cooperative, and alert to conversation but does seem to be quite confused, giving varying answers to the same question when asked to repeat herself.  Eyes: Tracking well, clear conjunctiva BL  ENT: MMM, midline trachea.   Lungs:  No tachypnea, no accessory muscle use. No respiratory distress.   CV: Rate as above, regular rhythm.    Abd: Soft, non tender, no guarding, no rebound. Non distended  MSK: no peripheral edema or joint effusion.  No evidence of trauma  Skin: No rash, normal turgor and temperature  Neuro: Whispered speech and no facial droop.  Moving all extremities spontaneously.  Psych: Not RIS, no e/o AH/VH    Emergency Department Course     Imaging:  CT Head w/o Contrast  Chronic changes. No evidence for intracranial hemorrhage or any acute process.  GIL TIAN MD  Reading per radiology    Laboratory:  CBC: WBC 10.7, HGB 10.1 (L),   BMP: Cl 111 (H) Glc 132 (H) BUN 57 (H) creatinine 2.08 (H) GFR 23 (L) o/w WNL   ISTAT VB.28(L)/47/51(H)/22; Lactic Acid (Resulted: 1351): 0.8  UA: glc 70 (H) blood small (A) protein 100 (A) LE large (A) WBC >182(H) RBC 30 (H) WBC clumps present (A) bacteria moderate (A) yeast many (A) o/w WNL  Urine culture: Pending    Interventions:  1703: Rocephin 2g in  mL IV infusion    Emergency Department Course:  1343 IV was inserted and blood was drawn for laboratory testing, results above.   The patient provided a urine sample here in the emergency department. This was sent for laboratory testing, findings above.  1439 Nursing notes and vitals reviewed.  1446 I performed an exam of the patient as documented above.   1538 The patient was sent for a CT while in the emergency department, results above.   1655 Discussed the patient with Dr. Carrizales, who will admit the patient to a medical bed for further monitoring, evaluation, and treatment. Patient admitted in stable condition.     Impression & Plan      Medical Decision Making:  A very pleasant 73 year old  female who presents to the emergency room with what appears to be metabolic encephalopathy likely secondary to a UTI. She had pan-sensitive staph aureus in urine culture around 3 weeks ago and appears to have finished her antibiotics but is still having symptoms. She has dirty urine here in the ER. Per  with whom we spoke with on the phone, the patient is more confused than normal. She did have her rico changed out two days ago per her , so I will not change out the catheter however I do think she needs IV antibiotics and be admitted to the hospital given treatment failure. Will plan for admission to the care of Dr. Carrizales who generously agrees to accept the care of the patient to a monitor setting until she can easily be transferred to oral antibiotics and her altered mental state resolves. CT of the head was negative.     Diagnosis:    ICD-10-CM   1. Altered mental status, unspecified altered mental status type R41.82   2. Urinary tract infection associated with indwelling urethral catheter, subsequent encounter T83.511D     Disposition: Admit    Scribe Disclosure:  I, Gaurav Jarrell, am serving as a scribe at 3:40 PM on 8/6/2019 to document services personally performed by Armen Gregg MD based on my observations and the provider's statements to me.     EMERGENCY DEPARTMENT       Armen Gregg MD  08/06/19 6345

## 2019-08-06 NOTE — ED NOTES
"[facility]  ED Nurse Handoff Report    ED Chief complaint: Generalized Weakness      ED Diagnosis:   Final diagnoses:   Altered mental status, unspecified altered mental status type   Urinary tract infection associated with indwelling urethral catheter, subsequent encounter       Code Status: To be addressed by admitting MD.     Allergies:   Allergies   Allergen Reactions     Atorvastatin Calcium      myalgias, muscle aches     Codeine Nausea and Vomiting     Darvocet [Propoxyphene N-Apap] Nausea and Vomiting     Hydromorphone      Levofloxacin Other (See Comments) and Diarrhea     hallucinations     Morphine      Nausea and vomiting     Niaspan [Niacin] GI Disturbance     Flushing even at low dose, GI upset     Percocet [Oxycodone-Acetaminophen]      Vomiting after taking med couple of times     Vicodin [Hydrocodone-Acetaminophen] Nausea and Vomiting       Activity level - Baseline/Home:  Total Care  Activity Level - Current:   Total Care    Patient's Preferred language: English   Needed?: No    Isolation: Yes  Infection: MRSA, VRE, ESBL   Bariatric?: No    Vital Signs:   Vitals:    08/06/19 1400 08/06/19 1500 08/06/19 1552 08/06/19 1558   BP: 122/62 (!) 152/69 (!) 147/63    Pulse: 62 59 58    Resp:       Temp:       TempSrc:       SpO2: 98%   99%       Cardiac Rhythm: ,        Pain level: 0-10 Pain Scale: 0    Is this patient confused?: Yes   Does this patient have a guardian?  No         If yes, is there guardianship documents in the Epic \"Code/ACP\" activity?  N/A         Guardian Notified?  N/A  [place] - Suicide Severity Rating Scale Completed?  Yes  If yes, what color did the patient score?  White    Patient Report: Initial Complaint: Pt arrived to ED for evaluation of altered mental status.   Focused Assessment: Pt presents with increased weakness and confusion since this morning. Pt is not impulsive and resting comfortablly. Pt's baseline needs is total assistance with all cares. " Pt has a history of urinary tract infections. Pt has a indwelling rico catheter that was last changed Saturday per . Per MD catheter does not need to be changed at this time. Pt received one dose of Rocephin in the ED. Pt did need 2L of oxygen for O2 saturations in the 80s while sleeping.   Tests Performed: Labs, CT  Abnormal Results:   Treatments provided: IV antibiotics     Family Comments: No family at bedside    OBS brochure/video discussed/provided to patient/family: No              Name of person given brochure if not patient: NA              Relationship to patient: NA    ED Medications:   Medications   cefTRIAXone (ROCEPHIN) 2 g vial to attach to  ml bag for ADULTS or NS 50 ml bag for PEDS (has no administration in time range)       Drips infusing?:  No    For the majority of the shift this patient was Green.   Interventions performed were NA.    Severe Sepsis OR Septic Shock Diagnosis Present: No    To be done/followed up on inpatient unit:  Monitor    ED NURSE PHONE NUMBER: [id number]           Spine appears normal, range of motion is not limited, no muscle or joint tenderness

## 2019-08-07 ENCOUNTER — APPOINTMENT (OUTPATIENT)
Dept: ULTRASOUND IMAGING | Facility: CLINIC | Age: 74
DRG: 698 | End: 2019-08-07
Attending: INTERNAL MEDICINE
Payer: MEDICARE

## 2019-08-07 LAB
ANION GAP SERPL CALCULATED.3IONS-SCNC: 7 MMOL/L (ref 3–14)
BASOPHILS # BLD AUTO: 0 10E9/L (ref 0–0.2)
BASOPHILS NFR BLD AUTO: 0.3 %
BUN SERPL-MCNC: 60 MG/DL (ref 7–30)
CALCIUM SERPL-MCNC: 8.3 MG/DL (ref 8.5–10.1)
CHLORIDE SERPL-SCNC: 113 MMOL/L (ref 94–109)
CO2 SERPL-SCNC: 20 MMOL/L (ref 20–32)
CREAT SERPL-MCNC: 2.07 MG/DL (ref 0.52–1.04)
DIFFERENTIAL METHOD BLD: ABNORMAL
EOSINOPHIL # BLD AUTO: 0.2 10E9/L (ref 0–0.7)
EOSINOPHIL NFR BLD AUTO: 2.1 %
ERYTHROCYTE [DISTWIDTH] IN BLOOD BY AUTOMATED COUNT: 15 % (ref 10–15)
GFR SERPL CREATININE-BSD FRML MDRD: 23 ML/MIN/{1.73_M2}
GLUCOSE BLDC GLUCOMTR-MCNC: 113 MG/DL (ref 70–99)
GLUCOSE BLDC GLUCOMTR-MCNC: 131 MG/DL (ref 70–99)
GLUCOSE BLDC GLUCOMTR-MCNC: 88 MG/DL (ref 70–99)
GLUCOSE BLDC GLUCOMTR-MCNC: 93 MG/DL (ref 70–99)
GLUCOSE BLDC GLUCOMTR-MCNC: 97 MG/DL (ref 70–99)
GLUCOSE SERPL-MCNC: 88 MG/DL (ref 70–99)
HBA1C MFR BLD: 6.8 % (ref 0–5.6)
HCT VFR BLD AUTO: 31.6 % (ref 35–47)
HGB BLD-MCNC: 9.7 G/DL (ref 11.7–15.7)
IMM GRANULOCYTES # BLD: 0 10E9/L (ref 0–0.4)
IMM GRANULOCYTES NFR BLD: 0.3 %
LYMPHOCYTES # BLD AUTO: 1.2 10E9/L (ref 0.8–5.3)
LYMPHOCYTES NFR BLD AUTO: 13.6 %
MCH RBC QN AUTO: 28.6 PG (ref 26.5–33)
MCHC RBC AUTO-ENTMCNC: 30.7 G/DL (ref 31.5–36.5)
MCV RBC AUTO: 93 FL (ref 78–100)
MONOCYTES # BLD AUTO: 0.6 10E9/L (ref 0–1.3)
MONOCYTES NFR BLD AUTO: 6.3 %
NEUTROPHILS # BLD AUTO: 7 10E9/L (ref 1.6–8.3)
NEUTROPHILS NFR BLD AUTO: 77.4 %
NRBC # BLD AUTO: 0 10*3/UL
NRBC BLD AUTO-RTO: 0 /100
PLATELET # BLD AUTO: 144 10E9/L (ref 150–450)
POTASSIUM SERPL-SCNC: 4.5 MMOL/L (ref 3.4–5.3)
RBC # BLD AUTO: 3.39 10E12/L (ref 3.8–5.2)
SODIUM SERPL-SCNC: 140 MMOL/L (ref 133–144)
TACROLIMUS BLD-MCNC: 3.4 UG/L (ref 5–15)
TME LAST DOSE: ABNORMAL H
WBC # BLD AUTO: 9.1 10E9/L (ref 4–11)

## 2019-08-07 PROCEDURE — 83036 HEMOGLOBIN GLYCOSYLATED A1C: CPT | Performed by: INTERNAL MEDICINE

## 2019-08-07 PROCEDURE — 25000128 H RX IP 250 OP 636: Performed by: INTERNAL MEDICINE

## 2019-08-07 PROCEDURE — 00000146 ZZHCL STATISTIC GLUCOSE BY METER IP

## 2019-08-07 PROCEDURE — 76776 US EXAM K TRANSPL W/DOPPLER: CPT

## 2019-08-07 PROCEDURE — 25000125 ZZHC RX 250

## 2019-08-07 PROCEDURE — 25000132 ZZH RX MED GY IP 250 OP 250 PS 637: Mod: GY | Performed by: INTERNAL MEDICINE

## 2019-08-07 PROCEDURE — 85025 COMPLETE CBC W/AUTO DIFF WBC: CPT | Performed by: INTERNAL MEDICINE

## 2019-08-07 PROCEDURE — 99233 SBSQ HOSP IP/OBS HIGH 50: CPT | Performed by: INTERNAL MEDICINE

## 2019-08-07 PROCEDURE — 25800030 ZZH RX IP 258 OP 636: Performed by: INTERNAL MEDICINE

## 2019-08-07 PROCEDURE — 36415 COLL VENOUS BLD VENIPUNCTURE: CPT | Performed by: INTERNAL MEDICINE

## 2019-08-07 PROCEDURE — 25000131 ZZH RX MED GY IP 250 OP 636 PS 637: Mod: GY | Performed by: INTERNAL MEDICINE

## 2019-08-07 PROCEDURE — 80048 BASIC METABOLIC PNL TOTAL CA: CPT | Performed by: INTERNAL MEDICINE

## 2019-08-07 PROCEDURE — 80197 ASSAY OF TACROLIMUS: CPT | Performed by: INTERNAL MEDICINE

## 2019-08-07 PROCEDURE — 12000000 ZZH R&B MED SURG/OB

## 2019-08-07 RX ORDER — LIDOCAINE HYDROCHLORIDE 20 MG/ML
JELLY TOPICAL ONCE
Status: DISCONTINUED | OUTPATIENT
Start: 2019-08-07 | End: 2019-08-09 | Stop reason: HOSPADM

## 2019-08-07 RX ORDER — GABAPENTIN 100 MG/1
100 CAPSULE ORAL
Status: DISCONTINUED | OUTPATIENT
Start: 2019-08-07 | End: 2019-08-09 | Stop reason: HOSPADM

## 2019-08-07 RX ORDER — GABAPENTIN 300 MG/1
300 CAPSULE ORAL
Status: DISCONTINUED | OUTPATIENT
Start: 2019-08-07 | End: 2019-08-07

## 2019-08-07 RX ORDER — CHOLECALCIFEROL (VITAMIN D3) 50 MCG
4000 TABLET ORAL 2 TIMES DAILY
Status: DISCONTINUED | OUTPATIENT
Start: 2019-08-07 | End: 2019-08-09 | Stop reason: HOSPADM

## 2019-08-07 RX ORDER — LIDOCAINE HYDROCHLORIDE 20 MG/ML
JELLY TOPICAL
Status: COMPLETED
Start: 2019-08-07 | End: 2019-08-07

## 2019-08-07 RX ORDER — GABAPENTIN 300 MG/1
300 CAPSULE ORAL
Status: DISCONTINUED | OUTPATIENT
Start: 2019-08-07 | End: 2019-08-09 | Stop reason: HOSPADM

## 2019-08-07 RX ADMIN — PRAVASTATIN SODIUM 10 MG: 10 TABLET ORAL at 12:17

## 2019-08-07 RX ADMIN — LEVETIRACETAM 250 MG: 250 TABLET, FILM COATED ORAL at 12:16

## 2019-08-07 RX ADMIN — ASPIRIN 81 MG: 81 TABLET, COATED ORAL at 12:15

## 2019-08-07 RX ADMIN — CHOLECALCIFEROL TAB 50 MCG (2000 UNIT) 4000 UNITS: 50 TAB at 15:26

## 2019-08-07 RX ADMIN — LIDOCAINE HYDROCHLORIDE: 20 JELLY TOPICAL at 21:42

## 2019-08-07 RX ADMIN — PRAZOSIN HYDROCHLORIDE 1 MG: 1 CAPSULE ORAL at 22:00

## 2019-08-07 RX ADMIN — LORATADINE 10 MG: 10 TABLET ORAL at 12:17

## 2019-08-07 RX ADMIN — MICONAZOLE NITRATE: 20 POWDER TOPICAL at 06:18

## 2019-08-07 RX ADMIN — LEVETIRACETAM 250 MG: 250 TABLET, FILM COATED ORAL at 21:33

## 2019-08-07 RX ADMIN — Medication 1 CAPSULE: at 21:34

## 2019-08-07 RX ADMIN — INSULIN GLARGINE 20 UNITS: 100 INJECTION, SOLUTION SUBCUTANEOUS at 21:34

## 2019-08-07 RX ADMIN — TACROLIMUS 2 MG: 1 CAPSULE, GELATIN COATED ORAL at 12:18

## 2019-08-07 RX ADMIN — AMLODIPINE BESYLATE 10 MG: 10 TABLET ORAL at 12:15

## 2019-08-07 RX ADMIN — GABAPENTIN 300 MG: 300 CAPSULE ORAL at 15:26

## 2019-08-07 RX ADMIN — LEVOTHYROXINE SODIUM 50 MCG: 50 TABLET ORAL at 06:15

## 2019-08-07 RX ADMIN — CHOLECALCIFEROL TAB 50 MCG (2000 UNIT) 4000 UNITS: 50 TAB at 21:33

## 2019-08-07 RX ADMIN — CEFTRIAXONE SODIUM 2 G: 2 INJECTION, POWDER, FOR SOLUTION INTRAMUSCULAR; INTRAVENOUS at 18:43

## 2019-08-07 RX ADMIN — FAMOTIDINE 20 MG: 20 TABLET, FILM COATED ORAL at 21:35

## 2019-08-07 RX ADMIN — SODIUM CHLORIDE: 9 INJECTION, SOLUTION INTRAVENOUS at 18:41

## 2019-08-07 RX ADMIN — PREDNISONE 5 MG: 5 TABLET ORAL at 12:17

## 2019-08-07 RX ADMIN — LABETALOL HYDROCHLORIDE 150 MG: 100 TABLET, FILM COATED ORAL at 12:16

## 2019-08-07 RX ADMIN — Medication 1 CAPSULE: at 12:16

## 2019-08-07 RX ADMIN — SODIUM CHLORIDE: 9 INJECTION, SOLUTION INTRAVENOUS at 09:07

## 2019-08-07 RX ADMIN — LABETALOL HYDROCHLORIDE 150 MG: 100 TABLET, FILM COATED ORAL at 21:33

## 2019-08-07 RX ADMIN — GABAPENTIN 600 MG: 300 CAPSULE ORAL at 12:15

## 2019-08-07 RX ADMIN — TACROLIMUS 2 MG: 1 CAPSULE, GELATIN COATED ORAL at 18:48

## 2019-08-07 ASSESSMENT — ACTIVITIES OF DAILY LIVING (ADL)
ADLS_ACUITY_SCORE: 38
ADLS_ACUITY_SCORE: 38
ADLS_ACUITY_SCORE: 36
ADLS_ACUITY_SCORE: 38

## 2019-08-07 NOTE — TELEPHONE ENCOUNTER
Updated [facility] -  [facility] - nothing needs to be faxed - information can be pulled by [facility] from [facility]

## 2019-08-07 NOTE — PROGRESS NOTES
Discussed with PCP, will hold off on calling pt since she has been readmitted.     Conchita Toledo, VivianD, BCACP

## 2019-08-07 NOTE — PLAN OF CARE
Discharge Planner PT   Patient plan for discharge: None stated  Current status: Orders received, chart reviewed. Per chart, Pt is total cares for mobility and ADLs.   Barriers to return to prior living situation: none indicated  Recommendations for discharge: return home with continued services as prior to current hospitalization  Rationale for recommendations: no IP PT indicated as Pt is bed-bound at baseline and receives assist with all cares. IP PT consult will be completed at this time.       Entered by: Ariane Choudhury 08/07/2019 9:06 AM

## 2019-08-07 NOTE — PROVIDER NOTIFICATION
MD Notification    Notified Person: MD    Notified Person Name: Dr Salgado    Notification Date/Time: 8/6/2019  2218 PM    Notification Interaction: On call Answering service    Purpose of Notification: Pt is going to be NPO after midnight for renal US tomorrow. Would you like the lantus to be held tonight?Most recent BG was 112?    Orders Received: Hold lantus tonight    Comments:

## 2019-08-07 NOTE — PROGRESS NOTES
[facility]  Patient is currently open to home care services with [facility]. The patient is currently receiving RN and HHA services. [facility]  and team have been notified of patient admission. [facility] liaison will continue to follow patient during stay. If appropriate provide orders to resume home care at time of discharge.

## 2019-08-07 NOTE — PLAN OF CARE
DATE & TIME: Night shift, 8/7/19  Cognitive Concerns/ Orientation : alert and oriented x1, lethargic   BEHAVIOR & AGGRESSION TOOL COLOR: GREEN  CIWA SCORE: NA   ABNL VS/O2: 154/64 other VSS; CPAP overnight  MOBILITY: lift  PAIN MANAGMENT: none needed  DIET: NPO for Renal US  BOWEL/BLADDER: incontinent of BM; chronic rico  ABNL LAB/BG: BGM was 113; Cr 2.08  DRAIN/DEVICES: rico  TELEMETRY RHYTHM: NA  SKIN: bruised, pale, blanchable coccyx (foam applied). InterDry under breast/arm fold and panus.   TESTS/PROCEDURES: Renal US  D/C DAY/GOALS/PLACE: Just admitted last night with UTI. On IV antibiotics. Confused from infection and antibiotics per . Tolerated own cpap all night.   OTHER IMPORTANT INFO: R great toe was amputated last month; remains wrapped. Dressing CDI. Rico was changed 2 days ago.

## 2019-08-07 NOTE — PLAN OF CARE
DATE & TIME: 2019 [id number]    Cognitive Concerns/ Orientation : Oriented to self and place, lethargic   BEHAVIOR & AGGRESSION TOOL COLOR: Green  CIWA SCORE: N/A   ABNL VS/O2: VSS on room air, except BP runs high. Uses CPAP at night.  MOBILITY: Total Care, turn and re-position q2h  PAIN MANAGMENT: Denies pain  DIET: Tolerating Mod. Carb. Diet - Total Feed  BOWEL/BLADDER: Johnson in place and patent  ABNL LAB/BG: B / 88 / 97 / Chloride 113 / Urea Nitrogen 60 / Creatinine 2.07 / GFR Estimate 23 / Calcium 8.3 / Hgb A1C 6.8f / Hgb 9.7 / Hematocrit 31.6 / Platelet Count 144 / RBC Count 3.39 / MCHC 30.7   DRAIN/DEVICES: PIV infusing at 100 ml/hr  TELEMETRY RHYTHM: N/A  SKIN: Bruised, pale. InterDry under breasts, arm folds, and panus.   TESTS/PROCEDURES: Had Renal US in AM  D/C DAY/GOALS/PLACE: Pending clinical improvement   OTHER IMPORTANT INFO: R Great Toe was amputated last month, dressing in place, CDI. Podiatry changed dressing today.

## 2019-08-07 NOTE — PROGRESS NOTES
[facility]    Post-op check    7/19/19:    POSTOPERATIVE DIAGNOSES:  Chronic ulceration and osteomyelitis, right hallux.      PROCEDURE:  Amputation of the right hallux at the metatarsophalangeal joint level.     EXAM:  B/P: 142/57, T: 98.4, P: 62, R: 18  Incision fully coapted and sutures intact.  Incision is dry  No erythema, edema.    ASSESSMENT:  Status post right hallux amputation on 7/19/19 for treatment of osteomyelitis.  She is home bound and has not followed up in clinic. I have talked with her  and her home health care NP.  The nurse practitioner is planning on removing the sutures. I recommended that be left in 3-4 weeks.     PLAN:  Sterile re-dress of the right foot  This dressing can be left on  Suture removal at 3-4 weeks post op by her home health NP    Armen Jefferson DPM, FACFAS, MS    [facility]

## 2019-08-07 NOTE — PLAN OF CARE
Discharge Planner OT   Patient plan for discharge: Unknown.  Current status: Order recd however patient is DEP for ADL/IADL at baseline.  Barriers to return to prior living situation: None  Recommendations for discharge: Previous living environment with prior level of assist.  Rationale for recommendations: OT eval not indicated as patient is DEP at baseline. Order complete.       Entered by: Aditi Jimenez 08/07/2019 9:16 AM

## 2019-08-07 NOTE — PLAN OF CARE
Admission    Patient arrives to room 624 via cart from ER  Care plan note:   DATE & TIME: 8/6/2019  2330     Cognitive Concerns/ Orientation : A but confused.  BEHAVIOR & AGGRESSION TOOL COLOR:  Green  CIWA SCORE: N/A  ABNL VS/O2: VSS on RA  MOBILITY: Bedridden, up with Ceiling lift  PAIN MANAGMENT: Tylenol given for HA effective  DIET: Combination cardiac and diabetic diet  BOWEL/BLADDER: Incontinent, chronic indwelling catheter  ABNL LAB/BG: Crt elevated  DRAIN/DEVICES: PIV and rico catheter  TELEMETRY RHYTHM: NA  SKIN: Excoriation, pressure injury, rash in the skin folds, toe amputation on the right  TESTS/PROCEDURES:  CT head, renal US pending. Pt to be NPO after midnight  D/C DAY/GOALS/PLACE: Pending progress  OTHER IMPORTANT INFO: Contact Iso initiated.    Inpatient nursing criteria listed below were met:    PCD's Documented: Yes  Skin issues/needs documented :Yes  Isolation education started/completed No  Patient allergies verified with patient: Yes  Verified completion of [place] Risk Assessment Tool:  No  Verified completion of Guardianship screening tool: No  Fall Prevention: Care plan updated, Education given and documented Yes  Care Plan initiated: Yes  Home medications documented in belongings flowsheet: NA  Patient belongings documented in belongings flowsheet: Yes  Reminder note (belongings/ medications) placed in discharge instructions:NA  Admission profile/ required documentation complete: No  Bedside Report Letter given and explained to patient No

## 2019-08-07 NOTE — PROGRESS NOTES
[facility]    Hospitalist Progress Note    Date of Service (when I saw the patient): 08/07/2019    Assessment & Plan   Gem Jade is a 73 year old female who was admitted on 8/6/2019.      This is a  73 year old female admitted with lethargy and  catheter associated urinary tract infection.     1.  Catheter associated urinary tract infection.  Blood and urine cultures been sent.  Patient's been started on ceftriaxone.  Catheter should be replaced.  Continue baclofen for bladder spasms.     2.  Metabolic encephalopathy.  Likely due to infection and meds especially gabapentin contributing .  CT scan demonstrates mild to moderate cerebral atrophy without acute changes.  Monitor patient overnight.  I suspect patient's mentation will clear after treatment of the urinary tract infection.  Will reduce gabapentin from 600mg BID to 300mg BID and 300mg at bedtime to 100mg at bedtime PRN especially with ROB   She is little more awake today      3.  Acute kidney injury.    Baseline creatinine approximately 1.5-1.7.    Cr still elevated at 2.0  Ultrasound pending   Continue IVF at 100ml/hr      4.  Renal transplant.  Continue tacrolimus and prednisone.    Tacrolimus level pending    5.  Diabetes mellitus type 2.  Reduce glargine to 20 units at bedtime.  Place patient on insulin sliding scale.  Check hemoglobin A1c.     6.  Hypertension.  Blood pressure well controlled on amlodipine, labetalol, and prazosin.     7.  Hypothyroidism.  Continue levothyroxine.     8.  Chronic anemia.  Hemoglobin is at baseline.     9.  Chronic pain.  Continue acetaminophen, gabapentin, and as needed tramadol.     10.  Hyperlipidemia.  Continue pravastatin.     11.  Seizure disorder.  Continue Keppra.  The patient's Keppra dose was reduced by neurology in July following an episode of unconsciousness.          DVT Prophylaxis: Pneumatic Compression Devices  Code Status: Full Code    Disposition: Expected discharge in 2-3days once  mental status and Cr improves     Janki Marin MD  [phone] (P)      Interval History   She was more somnolent this AM. Just now waking up slowly . Denies any pain. No sob . No f/c     -Data reviewed today: I reviewed all new labs and imaging results over the last 24 hours. I personally reviewed no images or EKG's today.    Physical Exam   Temp: 98  F (36.7  C) Temp src: Oral BP: (!) 136/94 Pulse: 62 Heart Rate: 64 Resp: 18 SpO2: 98 % O2 Device: BiPAP/CPAP Oxygen Delivery: 2 LPM  There were no vitals filed for this visit.  Vital Signs with Ranges  Temp:  [98  F (36.7  C)-99.2  F (37.3  C)] 98  F (36.7  C)  Pulse:  [56-62] 62  Heart Rate:  [57-95] 64  Resp:  [16-20] 18  BP: (119-165)/(59-94) 136/94  SpO2:  [94 %-100 %] 98 %  I/O last 3 completed shifts:  In: 240 [P.O.:240]  Out: 950 [Urine:950]    Constitutional: Somnolent , no apparent distress  Respiratory: Clear to auscultation bilaterally, no crackles or wheezing  Cardiovascular: Regular rate and rhythm, normal S1 and S2, and no murmur noted  GI: Normal bowel sounds, soft, non-distended, non-tender  Skin/Integumen: No rashes, no cyanosis, no edema  Other:     Medications     sodium chloride 100 mL/hr at 08/07/19 0907       amLODIPine  10 mg Oral Daily     aspirin  81 mg Oral Daily     cefTRIAXone  2 g Intravenous Q24H     famotidine  20 mg Oral At Bedtime     gabapentin  600 mg Oral BID     insulin aspart  1-10 Units Subcutaneous TID AC     insulin aspart  1-7 Units Subcutaneous At Bedtime     insulin glargine  20 Units Subcutaneous At Bedtime     labetalol  150 mg Oral BID     lactobacillus rhamnosus (GG)  1 capsule Oral BID     levETIRAcetam  250 mg Oral BID     levothyroxine  50 mcg Oral QAM AC     loratadine  10 mg Oral Daily     pravastatin  10 mg Oral Daily     prazosin  1 mg Oral At Bedtime     predniSONE  5 mg Oral Daily     tacrolimus  2 mg Oral BID IS     vitamin D3  4,000 Units Oral BID       Data   Recent Labs   Lab 08/07/19  0855 08/06/19  1347    WBC 9.1 10.7   HGB 9.7* 10.1*   MCV 93 94   * 170    141   POTASSIUM 4.5 4.7   CHLORIDE 113* 111*   CO2 20 23   BUN 60* 57*   CR 2.07* 2.08*   ANIONGAP 7 7   ROGER 8.3* 8.5   GLC 88 132*       Recent Results (from the past 24 hour(s))   CT Head w/o Contrast    Narrative    CT SCAN OF THE HEAD WITHOUT CONTRAST   8/6/2019 3:47 PM     HISTORY: Altered mental status. Dizziness and giddiness.    TECHNIQUE: Axial images of the head and coronal reformations without  IV contrast material. Radiation dose for this scan was reduced using  automated exposure control, adjustment of the mA and/or kV according  to patient size, or iterative reconstruction technique.    COMPARISON: 7/15/2019.    FINDINGS: Mild to moderate cerebral atrophy is present. There are some  minimal patchy white matter changes in both hemispheres without mass  effect. There is no evidence for intracranial hemorrhage, mass effect,  acute infarct, or a skull fracture.      Impression    IMPRESSION: Chronic changes. No evidence for intracranial hemorrhage  or any acute process.    GIL TIAN MD

## 2019-08-08 ENCOUNTER — PATIENT OUTREACH (OUTPATIENT)
Dept: CARE COORDINATION | Facility: CLINIC | Age: 74
End: 2019-08-08

## 2019-08-08 DIAGNOSIS — Z00.00 ROUTINE GENERAL MEDICAL EXAMINATION AT A HEALTH CARE FACILITY: Primary | ICD-10-CM

## 2019-08-08 LAB
BACTERIA SPEC CULT: ABNORMAL
BACTERIA SPEC CULT: ABNORMAL
GLUCOSE BLDC GLUCOMTR-MCNC: 102 MG/DL (ref 70–99)
GLUCOSE BLDC GLUCOMTR-MCNC: 111 MG/DL (ref 70–99)
GLUCOSE BLDC GLUCOMTR-MCNC: 118 MG/DL (ref 70–99)
GLUCOSE BLDC GLUCOMTR-MCNC: 130 MG/DL (ref 70–99)
GLUCOSE BLDC GLUCOMTR-MCNC: 80 MG/DL (ref 70–99)
Lab: ABNORMAL
SPECIMEN SOURCE: ABNORMAL

## 2019-08-08 PROCEDURE — 12000000 ZZH R&B MED SURG/OB

## 2019-08-08 PROCEDURE — 00000146 ZZHCL STATISTIC GLUCOSE BY METER IP

## 2019-08-08 PROCEDURE — 25000131 ZZH RX MED GY IP 250 OP 636 PS 637: Mod: GY | Performed by: INTERNAL MEDICINE

## 2019-08-08 PROCEDURE — 25000132 ZZH RX MED GY IP 250 OP 250 PS 637: Mod: GY | Performed by: INTERNAL MEDICINE

## 2019-08-08 PROCEDURE — 99232 SBSQ HOSP IP/OBS MODERATE 35: CPT | Performed by: INTERNAL MEDICINE

## 2019-08-08 RX ORDER — SULFAMETHOXAZOLE/TRIMETHOPRIM 800-160 MG
1 TABLET ORAL 2 TIMES DAILY
Status: DISCONTINUED | OUTPATIENT
Start: 2019-08-08 | End: 2019-08-09 | Stop reason: HOSPADM

## 2019-08-08 RX ORDER — LEVETIRACETAM 250 MG/1
250 TABLET ORAL DAILY
Status: DISCONTINUED | OUTPATIENT
Start: 2019-08-09 | End: 2019-08-09 | Stop reason: HOSPADM

## 2019-08-08 RX ADMIN — Medication 1 CAPSULE: at 22:05

## 2019-08-08 RX ADMIN — CHOLECALCIFEROL TAB 50 MCG (2000 UNIT) 4000 UNITS: 50 TAB at 10:44

## 2019-08-08 RX ADMIN — LABETALOL HYDROCHLORIDE 150 MG: 100 TABLET, FILM COATED ORAL at 22:05

## 2019-08-08 RX ADMIN — PREDNISONE 5 MG: 5 TABLET ORAL at 10:44

## 2019-08-08 RX ADMIN — GABAPENTIN 300 MG: 300 CAPSULE ORAL at 10:40

## 2019-08-08 RX ADMIN — TACROLIMUS 2 MG: 1 CAPSULE, GELATIN COATED ORAL at 10:40

## 2019-08-08 RX ADMIN — PRAVASTATIN SODIUM 10 MG: 10 TABLET ORAL at 10:43

## 2019-08-08 RX ADMIN — SULFAMETHOXAZOLE AND TRIMETHOPRIM 1 TABLET: 800; 160 TABLET ORAL at 14:01

## 2019-08-08 RX ADMIN — LEVETIRACETAM 250 MG: 250 TABLET, FILM COATED ORAL at 10:43

## 2019-08-08 RX ADMIN — LORATADINE 10 MG: 10 TABLET ORAL at 10:43

## 2019-08-08 RX ADMIN — PRAZOSIN HYDROCHLORIDE 1 MG: 1 CAPSULE ORAL at 22:05

## 2019-08-08 RX ADMIN — GABAPENTIN 300 MG: 300 CAPSULE ORAL at 18:06

## 2019-08-08 RX ADMIN — AMLODIPINE BESYLATE 10 MG: 10 TABLET ORAL at 10:41

## 2019-08-08 RX ADMIN — ASPIRIN 81 MG: 81 TABLET, COATED ORAL at 10:42

## 2019-08-08 RX ADMIN — INSULIN GLARGINE 20 UNITS: 100 INJECTION, SOLUTION SUBCUTANEOUS at 22:05

## 2019-08-08 RX ADMIN — Medication 1 CAPSULE: at 10:43

## 2019-08-08 RX ADMIN — LEVOTHYROXINE SODIUM 50 MCG: 50 TABLET ORAL at 10:39

## 2019-08-08 RX ADMIN — FAMOTIDINE 20 MG: 20 TABLET, FILM COATED ORAL at 22:04

## 2019-08-08 RX ADMIN — LABETALOL HYDROCHLORIDE 150 MG: 100 TABLET, FILM COATED ORAL at 10:42

## 2019-08-08 RX ADMIN — CHOLECALCIFEROL TAB 50 MCG (2000 UNIT) 4000 UNITS: 50 TAB at 22:05

## 2019-08-08 RX ADMIN — SULFAMETHOXAZOLE AND TRIMETHOPRIM 1 TABLET: 800; 160 TABLET ORAL at 22:05

## 2019-08-08 RX ADMIN — TACROLIMUS 2 MG: 1 CAPSULE, GELATIN COATED ORAL at 18:07

## 2019-08-08 ASSESSMENT — ACTIVITIES OF DAILY LIVING (ADL)
ADLS_ACUITY_SCORE: 34
ADLS_ACUITY_SCORE: 38
ADLS_ACUITY_SCORE: 36
ADLS_ACUITY_SCORE: 34
ADLS_ACUITY_SCORE: 34
ADLS_ACUITY_SCORE: 36

## 2019-08-08 NOTE — PROGRESS NOTES
[facility]    Hospitalist Progress Note    Date of Service (when I saw the patient): 08/08/2019    Assessment & Plan   Gem Jade is a 73 year old female who was admitted on 8/6/2019.      This is a  73 year old female admitted with lethargy and  catheter associated urinary tract infection.     1.  Catheter associated urinary tract infection.    Patient's been started on ceftriaxone.    Continue baclofen for bladder spasms.  Blood cultures negative   Urine culture>100k citrobacter sensitive to bactrim   Will discharge ceftriaxone and start her on bactrim today      2.  Metabolic encephalopathy.  Likely due to infection and meds especially gabapentin contributing .  CT scan demonstrates mild to moderate cerebral atrophy without acute changes.  Monitor patient overnight.  I suspect patient's mentation will clear after treatment of the urinary tract infection.  Will reduce gabapentin from 600mg BID to 300mg BID and 300mg at bedtime to 100mg at bedtime PRN especially with ROB   Mental  status much improved today   We should continue with the reduced mccauley of gabapentin on discharge as well      3.  Acute kidney injury.    Baseline creatinine approximately 1.5-1.7.    Cr still elevated at 2.0. Today BMp pending   She pulled out her IV last night. She is hard stick   Encourage oral hydration as she is more alert and awake now   Renal ultrasound transpalnted kidney looked OK   Hold IVF today as no IV   4. Recent right foot osteo s/p surgery:  Podiatry consulted and they changed dressing yesterday        4.  Renal transplant.  Continue tacrolimus and prednisone.    Tacrolimus level pending    5.  Diabetes mellitus type 2.  Reduce glargine to 20 units at bedtime.  Place patient on insulin sliding scale.    hgb A1c at 6.8   Blood sugars are well controlled      6.  Hypertension.  Blood pressure well controlled on amlodipine, labetalol, and prazosin.     7.  Hypothyroidism.  Continue levothyroxine.     8.   Chronic anemia.  Hemoglobin is at baseline.     9.  Chronic pain.  Continue acetaminophen, gabapentin, and as needed tramadol.     10.  Hyperlipidemia.  Continue pravastatin.     11.  Seizure disorder.  Continue Keppra.  The patient's Keppra dose was reduced by neurology in July following an episode of unconsciousness as the level was high.   questioning the need for keppra as she neever had seizures will consult Neurology to see if she can come off of Keppra               DVT Prophylaxis: Pneumatic Compression Devices  Code Status: Full Code    Disposition: Expected discharge in 1-2days if Cr remains stable and menatsl status stays stable     Janki Marin MD  [phone] (P)      Interval History   She is alert and awake  this AM. No SOb or chest pain     -Data reviewed today: I reviewed all new labs and imaging results over the last 24 hours. I personally reviewed no images or EKG's today.    Physical Exam   Temp: 98  F (36.7  C) Temp src: Oral BP: (!) 171/80   Heart Rate: 68 Resp: 16 SpO2: 97 % O2 Device: None (Room air)    There were no vitals filed for this visit.  Vital Signs with Ranges  Temp:  [98  F (36.7  C)-99.5  F (37.5  C)] 98  F (36.7  C)  Heart Rate:  [65-75] 68  Resp:  [16-19] 16  BP: (138-171)/(56-80) 171/80  SpO2:  [94 %-97 %] 97 %  I/O last 3 completed shifts:  In: -   Out: 1000 [Urine:1000]    Constitutional: Alert and awake  , no apparent distress  Respiratory: Clear to auscultation bilaterally, no crackles or wheezing  Cardiovascular: Regular rate and rhythm, normal S1 and S2, and no murmur noted  GI: Normal bowel sounds, soft, non-distended, non-tender  Skin/Integumen: No rashes, no cyanosis, no edema  Other:     Medications     sodium chloride Stopped (08/08/19 0718)       amLODIPine  10 mg Oral Daily     aspirin  81 mg Oral Daily     famotidine  20 mg Oral At Bedtime     gabapentin  300 mg Oral BID     insulin aspart  1-10 Units Subcutaneous TID AC     insulin aspart  1-7 Units  Subcutaneous At Bedtime     insulin glargine  20 Units Subcutaneous At Bedtime     labetalol  150 mg Oral BID     lactobacillus rhamnosus (GG)  1 capsule Oral BID     levETIRAcetam  250 mg Oral BID     levothyroxine  50 mcg Oral QAM AC     lidocaine   Urethral Once     loratadine  10 mg Oral Daily     pravastatin  10 mg Oral Daily     prazosin  1 mg Oral At Bedtime     predniSONE  5 mg Oral Daily     sulfamethoxazole-trimethoprim  1 tablet Oral BID     tacrolimus  2 mg Oral BID IS     vitamin D3  4,000 Units Oral BID       Data   Recent Labs   Lab 08/07/19  0855 08/06/19  1343   WBC 9.1 10.7   HGB 9.7* 10.1*   MCV 93 94   * 170    141   POTASSIUM 4.5 4.7   CHLORIDE 113* 111*   CO2 20 23   BUN 60* 57*   CR 2.07* 2.08*   ANIONGAP 7 7   ROGER 8.3* 8.5   GLC 88 132*       No results found for this or any previous visit (from the past 24 hour(s)).

## 2019-08-08 NOTE — CONSULTS
Consult Date:  08/08/2019      NEUROLOGY CONSULTATION      CHIEF COMPLAINT:  Evaluate need for anticonvulsants.      HISTORY OF PRESENT ILLNESS:  Gem Jade is a 73-year-old lady with complicated medical history who is on Keppra with the current dose of 250 mg twice a day.  She is admitted to the hospital for lethargy, decreased mental status and acute urinary tract infection with worsening of baseline kidney failure.      The patient has a history of episodes of lethargy and decreased level of consciousness.  She also has episodes of questionable seizure in 05/2018.  I actually seen her in consultation.  At that time, she presented with encephalopathy.  Her EEG was read by Dr. Choudhury suggestive of seizures.  She was transferred to the Intensive Care Unit and loaded with Keppra.  I did review multiple EEG records read by Dr. Avina who suggested that the patient has a lot of slowing which has improved.  Overall, her mental status and EEG have improved after loading with Keppra.  Seizure was questioned, but could never be confirmed.  Since that presentation, the patient was admitted to the hospital in July of this year where she was encephalopathic and Keppra was 83.  She was seen by Dr. Chambers from the Neurology Department who recommended to decrease Keppra and follow up with Neurology.      The patient herself states that she does not have any history of seizures.  She had multiple imaging and EEG testing of her brain which never showed any definite diagnosis of seizures.  She would like to discontinue Medicine as she does have underlying kidney disorder with kidney failure and her creatinine is elevated.  The patient states that she really does not know if she has any side effects from medication; she does not think so.      Since admission to the hospital, mental status improved with treatment of UTI.  Keppra was continued.      On Reviewing the MRIs, there was presence of atrophy of the brain, but  otherwise no additional structural changes.      PAST MEDICAL HISTORY:  Retinopathy, diabetes with gastroparesis and neuropathy, amputated toe, dizziness, history of hyponatremia, kidney failure with transplant, hyperlipidemia, history of pulmonary embolus, osteoporosis, pyelonephritis, frequent urinary tract infections.      PAST SURGICAL HISTORY:  Kidney transplant.      SOCIAL HISTORY:  The patient is a former smoker.  She is  and resides with her .  She does not consume alcohol.      FAMILY HISTORY:  Noncontributory to this presentation.      ALLERGIES:  ATORVASTATIN, CODEINE, DARVOCET, HYDROMORPHONE, LEVOFLOXACIN, MORPHINE, NIACIN, PERCOCET AND VICODIN.       PRIOR TO ADMISSION MEDICATIONS:   1.  Tylenol.   2.  Norvasc.   3.  Baby aspirin.   4.  Baclofen.   5.  Blood glucose monitoring.   6.  Vitamin B12 injections once a month.   7.  Pepcid.   8.  Insulin.   9.  Loratadine.   10.  Pravastatin.   11.  Prednisone.   12.  Tacrolimus.   13.  Vitamin D.   14.  Keppra.   15.  Gabapentin.      Since admission, patient is on sulfa Bactrim antibiotic for UTI.      REVIEW OF SYSTEMS:  Review of organ systems shows overall no signs of urinary tract infection which is typical for the patient.  No seizures, no episodes of loss of consciousness, no overall neurological symptoms or any new besides peripheral neuropathy.      The remainder of organ systems review is stable to other symptoms.  Last MRI was performed in 05/2018, which showed no stroke and last CT scan was performed on this admission yesterday showed generalized cerebral atrophy.      PHYSICAL EXAMINATION:   VITAL SIGNS:  Current examination, the patient's blood pressure is 171/80, temperature 98, respiratory rate 16, heart rate 68.   GENERAL:  She is pleasant, talkative, in no acute distress.  She is overweight.   LUNGS:  Clear to auscultation bilaterally.   CARDIOVASCULAR:  S1, S2 cardiac sounds with no murmurs or bruits.   EXTREMITIES:  Warm.   Pedal pulses are present.  There is no evidence of pedal edema.   NEUROLOGIC:  Right toe was amputated and swollen.  Actually pedal edema is present in both legs, right foot is wrapped because of recent toe amputation.   NEUROLOGIC:  She is awake and alert.  Speech is clear.  Pupils are irregular, poorly reactive to light.  Extraocular movements in the full range.  Face is symmetrical.  Motor examination shows mildly decreased strength in both arms and legs consistent with patient's condition, but no focal weakness is seen.  Right foot cannot be tested because of recent amputation surgery.  Reflexes are absent in both arms and legs.  Sensory exam is difficult to perform right now.  Coordination is intact to finger-nose-finger.  Gait is not checked.  The patient herself reports that she primarily uses either a walker or wheelchair.      IMPRESSION AND RECOMMENDATIONS:  I am seeing this patient for need of anticonvulsant therapy.  I did review her chart and imaging studies.  Overall, I agree that there has been no convincing evidence of seizure disorder.  At this point, Keppra is used at a very small dose and probably even with the current Keppra is not providing adequate therapeutic coverage if this were to be active seizure disorder.  I would suggest that we could make a trial of tapering off Keppra.  We will start 250 mg once a day for a week and then if there are no indications of seizures, we could discontinue medicine.  Neurology followup would be needed if there are any concerns about potential ongoing seizures.  Overall, reviewed EEGs and MRIs do not support any strong evidence of seizure disorder at this point.  Patient strongly wishes to discontinue this medication.  After I reviewed everything and discussed with patient, my recommendations would be to discontinue Keppra, as mentioned above.        Please let me know if any additional neurological questions arise for this patient during this admission.          MARCE PRADO MD             D: 2019   T: 2019   MT: JERRELL      Name:     MARYJANE SHAVER   MRN:      [id number]        Account:       [id number]   :      1945           Consult Date:  2019      Document: [id number]

## 2019-08-08 NOTE — PROGRESS NOTES
"I spoke with the patient's  regarding the patient asking multiple times for a phone number to speak with her \"home nurse\".    Patient's  expressed that patient had called him multiple times from 0300 until sometime after 0700 this morning. I did tell him that she is much more lucid this morning and was able to answer the orientation questions correctly, except the date.  expressed concern and frustration, stating that every time the patient is hospitalized, the infection is treated and the patient is sent home on antibiotics, only to become confused and have develop diarrhea when she arrives home. He stated that \"I have to repeat her story multiple times every time she is admitted to the hospital\". I explained that a Neurology consult was placed by Dr. Marin and asked if he would like the consulting provider to call him after the patient's evaluation, he said yes and thanked me. I placed a Sticky Note to Physicians in the chart asking for the Neurology provider to contact the patient's .    He also stated that his wife is still \"not herself\" and she may seem better, but she's really not. He stated that he feels that \"We (Shantelle and I) slip through the cracks every time she is discharged from the hospital\". I offered to ask Dr. Marin to call and speak with him about his concerns, he refused stating that Dr. Marin has seen Shantelle many times and he spoke with her yesterday, so he did not wish to speak with her again unless there was \"something new\". He thanked me for listening to him and stated that he would call with any questions or concerns.      Patient's  (Chang/Keshawn) came up to visit patient and asked if there were any updates. Updated  on tapering of Keppra, per Neurology. Updated him on decrease in Gabapentin. He verbalized understanding and thanked me for my time and for listening to him.     He would like Podiatry to see the patient tomorrow or before she discharges so " that the sutures may be removed by Podiatry, as he states he only trust them to remove the sutures properly. Dr. Marin paged and is aware of the request.    Chang left for the evening, thanking me for my help and stated that he will call with any questions or concerns.

## 2019-08-08 NOTE — PROGRESS NOTES
Clinic Care Coordination Contact  Care Team Conversations    Attempted to outreach to patient for pro-active follow-up but chart review reveals patient is currently hospitalized. Will attempt to reach patient upon notification of discharge.     Porsha Khalil RN  [facility]-Care Coordination  [facility]  [facility]  [phone]

## 2019-08-08 NOTE — PLAN OF CARE
[id number]:   Pt alert to self. Increased agitation overnight. Pt was experiencing hallucinations and paranoia. C/o back pain but refused meds. Pt accuses staff of trying to kill her and ripped out left hand IV. Resistant with most cared. Turned and repositioned as tolerated. Assist x2. Johnson in place, cloudy urine.

## 2019-08-08 NOTE — CONSULTS
SW:  D:  Acknowledging SW consult for discharge planning. Care Coordinator is following for discharge planning.

## 2019-08-09 VITALS
OXYGEN SATURATION: 99 % | SYSTOLIC BLOOD PRESSURE: 135 MMHG | RESPIRATION RATE: 16 BRPM | DIASTOLIC BLOOD PRESSURE: 63 MMHG | HEART RATE: 62 BPM | TEMPERATURE: 98.2 F

## 2019-08-09 LAB
ANION GAP SERPL CALCULATED.3IONS-SCNC: 9 MMOL/L (ref 3–14)
BUN SERPL-MCNC: 48 MG/DL (ref 7–30)
CALCIUM SERPL-MCNC: 8.5 MG/DL (ref 8.5–10.1)
CHLORIDE SERPL-SCNC: 110 MMOL/L (ref 94–109)
CO2 SERPL-SCNC: 20 MMOL/L (ref 20–32)
CREAT SERPL-MCNC: 1.85 MG/DL (ref 0.52–1.04)
GFR SERPL CREATININE-BSD FRML MDRD: 27 ML/MIN/{1.73_M2}
GLUCOSE BLDC GLUCOMTR-MCNC: 119 MG/DL (ref 70–99)
GLUCOSE BLDC GLUCOMTR-MCNC: 92 MG/DL (ref 70–99)
GLUCOSE SERPL-MCNC: 93 MG/DL (ref 70–99)
POTASSIUM SERPL-SCNC: 4.2 MMOL/L (ref 3.4–5.3)
SODIUM SERPL-SCNC: 139 MMOL/L (ref 133–144)

## 2019-08-09 PROCEDURE — 99239 HOSP IP/OBS DSCHRG MGMT >30: CPT | Performed by: INTERNAL MEDICINE

## 2019-08-09 PROCEDURE — 25000132 ZZH RX MED GY IP 250 OP 250 PS 637: Mod: GY | Performed by: INTERNAL MEDICINE

## 2019-08-09 PROCEDURE — 80048 BASIC METABOLIC PNL TOTAL CA: CPT | Performed by: INTERNAL MEDICINE

## 2019-08-09 PROCEDURE — 00000146 ZZHCL STATISTIC GLUCOSE BY METER IP

## 2019-08-09 PROCEDURE — 25000132 ZZH RX MED GY IP 250 OP 250 PS 637: Mod: GY | Performed by: PSYCHIATRY & NEUROLOGY

## 2019-08-09 PROCEDURE — 36415 COLL VENOUS BLD VENIPUNCTURE: CPT | Performed by: INTERNAL MEDICINE

## 2019-08-09 PROCEDURE — 25000131 ZZH RX MED GY IP 250 OP 636 PS 637: Mod: GY | Performed by: INTERNAL MEDICINE

## 2019-08-09 RX ORDER — GABAPENTIN 100 MG/1
100 CAPSULE ORAL
Qty: 30 CAPSULE | Refills: 0 | Status: SHIPPED | OUTPATIENT
Start: 2019-08-09 | End: 2019-01-01

## 2019-08-09 RX ORDER — SULFAMETHOXAZOLE/TRIMETHOPRIM 800-160 MG
1 TABLET ORAL 2 TIMES DAILY
Qty: 14 TABLET | Refills: 0 | Status: SHIPPED | OUTPATIENT
Start: 2019-08-09 | End: 2019-01-01

## 2019-08-09 RX ORDER — LEVETIRACETAM 250 MG/1
250 TABLET ORAL DAILY
Qty: 5 TABLET
Start: 2019-08-09 | End: 2019-01-01

## 2019-08-09 RX ORDER — GABAPENTIN 300 MG/1
300 CAPSULE ORAL
Status: ON HOLD
Start: 2019-08-09 | End: 2019-01-01

## 2019-08-09 RX ADMIN — PREDNISONE 5 MG: 5 TABLET ORAL at 09:49

## 2019-08-09 RX ADMIN — ASPIRIN 81 MG: 81 TABLET, COATED ORAL at 09:48

## 2019-08-09 RX ADMIN — LABETALOL HYDROCHLORIDE 150 MG: 100 TABLET, FILM COATED ORAL at 09:48

## 2019-08-09 RX ADMIN — SULFAMETHOXAZOLE AND TRIMETHOPRIM 1 TABLET: 800; 160 TABLET ORAL at 09:48

## 2019-08-09 RX ADMIN — LORATADINE 10 MG: 10 TABLET ORAL at 09:49

## 2019-08-09 RX ADMIN — PRAVASTATIN SODIUM 10 MG: 10 TABLET ORAL at 09:49

## 2019-08-09 RX ADMIN — CHOLECALCIFEROL TAB 50 MCG (2000 UNIT) 4000 UNITS: 50 TAB at 09:48

## 2019-08-09 RX ADMIN — LEVOTHYROXINE SODIUM 50 MCG: 50 TABLET ORAL at 06:41

## 2019-08-09 RX ADMIN — Medication 1 CAPSULE: at 09:49

## 2019-08-09 RX ADMIN — LEVETIRACETAM 250 MG: 250 TABLET, FILM COATED ORAL at 09:48

## 2019-08-09 RX ADMIN — AMLODIPINE BESYLATE 10 MG: 10 TABLET ORAL at 09:48

## 2019-08-09 RX ADMIN — GABAPENTIN 300 MG: 300 CAPSULE ORAL at 09:49

## 2019-08-09 RX ADMIN — TACROLIMUS 2 MG: 1 CAPSULE, GELATIN COATED ORAL at 09:48

## 2019-08-09 ASSESSMENT — ACTIVITIES OF DAILY LIVING (ADL)
ADLS_ACUITY_SCORE: 36
ADLS_ACUITY_SCORE: 34
ADLS_ACUITY_SCORE: 35

## 2019-08-09 NOTE — PLAN OF CARE
[id number]: A&Ox4, however pt would state a men was crawling around in the room and asked for security to be called. VSS on RA ex BP runs high. Total care, Up with A2 & lift. Turn/repo q2hrs. Pt wouldn't tell staff if having any pain d/t not wanting any pain medication. Mod Carb diet, . Johnson Catheter (baseline) in place and patent. IV-SL. Skin bruised, InterDry under breasts, arms, skin looks little red & excoriated in those areas.  R Great Toe was amputated last month, ACE wrap dressing in place. Podiatry following. Pt little agitated during part of the shift and refusing cares/meds, was able to come back when pt was calm & cooperating. Discharge pending. Continue to monitor.

## 2019-08-09 NOTE — CONSULTS
Care Transition RN  Met with patient and spoke with patients spouse regarding discharge plans. Patient known to our team due to frequent readmissions.  Spouse stating patient at baseline is bed bound and gets frequent UTIs wit sepsis. Spouse aware due to her complicated medical history she needs medical attention. Patient has home care through [place] and would like to continue it. Patient has all equipments that's necessary to assist with cares.  Patient discharging today with follow up with primary.   requested transportation. [facility] stretcher transportation arranged as patient is confused occasionally and bedbound.

## 2019-08-09 NOTE — PLAN OF CARE
"DATE & TIME: 8/8/19 Night  Cognitive Concerns/ Orientation : Alert, dis to place and situation intermittently    BEHAVIOR & AGGRESSION TOOL COLOR: Green  CIWA SCORE: NA  ABNL VS/O2: VSS on RA/CPAP except /68  MOBILITY: T&R, lift assist  PAIN MANAGMENT: Reported neuropathy pain but declined medications.   DIET: Mod CHO  BOWEL/BLADDER: Johnson, good UOP/ No BM this shift  ABNL LAB/BG: , Creat 2.07 (on 8/7)  DRAIN/DEVICES: PIV SL, Johnson  TELEMETRY RHYTHM: NA  SKIN: Exoriation, PI on coccyx, dressing on left foot from great toe amputation.   TESTS/PROCEDURES: NA  D/C DAY/GOALS/PLACE: Note says 1-2 days if creatinine and mental status stay stable.  OTHER IMPORTANT INFO: Visual hallucination in beginning of shift of \"a man outside trying to break into her car\". After speaking to  over the phone, she was reassured and reoriented. Then around 0500, she reported not being able to sleep because \"mosquitos are getting inside my CPAP\". After the writer reoriented her, she was able to sleep.      "

## 2019-08-09 NOTE — PLAN OF CARE
Patient is A/O x4 for the most part, patient does appear to be confused/forgetful at times. No hallucinations noted this AM. VSS, RA. IV SL. Johnson patent and draining. BG checks. Mod-carb diet. Patient cleared for discharge this AM. Discharge paperwork reviewed with patient at bedside. All questions answered. Patient picked up by HE at 11:50. Patient stable at time of discharge.

## 2019-08-09 NOTE — PLAN OF CARE
DATE & TIME: 08/08/2019 [id number]    Cognitive Concerns/ Orientation : Alert and oriented x 3, disoriented to time. Forgetful.  BEHAVIOR & AGGRESSION TOOL COLOR: Green  CIWA SCORE: N/A   ABNL VS/O2: VSS on room air, except BP runs high  MOBILITY: Total Care, turn and re-position q2h  PAIN MANAGMENT: Stated she had some back pain, however she refused any medication  DIET: Tolerating Mod Carb diet  BOWEL/BLADDER: Johnson Catheter (baseline) in place and patent   ABNL LAB/BG: Blood Glucose: 80 / 102 / 111   DRAIN/DEVICES: PIV is saline locked  TELEMETRY RHYTHM: N/A  SKIN: Bruised, pale. InterDry under breasts, arms, and panus.  TESTS/PROCEDURES: N/A  D/C DAY/GOALS/PLACE: Pending clinical progress  OTHER IMPORTANT INFO: R Great Toe was amputated last month, dressing in place. Podiatry following. Antibiotics changed to oral. Can become agitated at night, per NOC RN.    Patient's  requests that Podiatry see the patient tomorrow or before discharge to remove the sutures, as he states he only trusts them to remove the sutures properly.

## 2019-08-09 NOTE — DISCHARGE SUMMARY
[facility]  Hospitalist Discharge Summary       Date of Admission:  8/6/2019  Date of Discharge:  8/9/2019  Discharging Provider: Socrates Baptiste MD      Discharge Diagnoses   Catheter associated urinary tract infection  Metabolic encephalopathy  Acute kidney injury  Hx of renal transplant  Type II DM  HTN  Chronic pain  Seizure disorder  Bed bound    Follow-ups Needed After Discharge   Follow-up Appointments     Follow-up and recommended labs and tests       1. Follow up with primary care provider, Marivel Barcenas NP,   within 7 days for hospital follow- up.  The following labs/tests are   recommended: basic metabolic panel to ensure stable renal function.    2. Suture removal of the right foot to be done in 3 weeks.             Unresulted Labs Ordered in the Past 30 Days of this Admission     Date and Time Order Name Status Description    8/6/2019 1615 Blood culture Preliminary     8/6/2019 1615 Blood culture Preliminary           Discharge Disposition   Discharged to home  Condition at discharge: Stable    Hospital Course   Gem Jdae is a 73 year old female who was admitted on 8/6/2019.    Catheter associated urinary tract infection.      She was initially treated with Ceftriaxone pending culture results. Urine culture>100k citrobacter sensitive to bactrim. She will discharge on Bacrim x 7 more days to complete course. Johnson has been exchanged on admission. Continues on PTA baclofen prn for bladder spasms.    Metabolic encephalopathy.    Likely due to infection and meds especially gabapentin contributing .  CT scan demonstrates mild to moderate cerebral atrophy without acute changes. Mentation has improved. Following med dose are made.  - reduced gabapentin from 600mg BID to 300mg BID and 300mg at bedtime to 100mg at bedtime PRN        Acute kidney injury.      CKD III-IV with Baseline creatinine approximately 1.5-1.8  Hx of renal transplant  Cr still elevated at 2.08 on  admission. Transplant US without acute finding. She received IVF.   - cr 1.85 on day of discharge.   - continue with PTA Prednisone and Tacrolimus  - f/u BMP in a week      Status post right hallux amputation on 7/19/19 for treatment of osteomyelitis.  - seen by podiatry this admission. Recommend suture removal in 3-4 weeks by home health NP    Diabetes mellitus type 2.  A1c 6.8  PTA 30 units Lantus, Aspart 10 units TID w/meals and sliding scale insulin  - reduced Lantus to 20 units daily and mealtime to 5 units TID  - will continue with PTA sliding scale insulin       Hypertension.  Blood pressure well controlled on amlodipine, labetalol, and prazosin.     Hypothyroidism.  Continue levothyroxine.     Chronic anemia.  Hemoglobin is at baseline.     Chronic pain.  Continue acetaminophen, gabapentin reduced as above, and as needed tramadol.     Hyperlipidemia.  Continue pravastatin.     Seizure disorder.  The patient's Keppra dose was reduced by neurology in July following an episode of unconsciousness as the level was high. Seen by neurology this hospital stay. Recommend tapering off Keppra. She will continue with 250mg daily x 5 more days then stop per neuro recs    Bed bound: patient is bed bound.   provides care for her as well as home services     updated over the phone regarding discharge planning and med changes.    Will resume PTA home health services at discharge      Consultations This Hospital Stay   PHYSICAL THERAPY ADULT IP CONSULT  OCCUPATIONAL THERAPY ADULT IP CONSULT  SOCIAL WORK IP CONSULT  PODIATRY IP CONSULT  NEUROLOGY IP CONSULT    Code Status   Full Code    Time Spent on this Encounter   ISocrates, personally saw the patient today and spent 40 minutes discharging this patient.       Socrates Baptiste MD  [facility]  ______________________________________________________________________    Physical Exam   Vital Signs: Temp: 98.2  F (36.8  C) Temp src:  Oral BP: 135/63   Heart Rate: 60 Resp: 16 SpO2: 99 % O2 Device: BiPAP/CPAP    Weight: 0 lbs 0 oz  General Appearance: Alert, awake and no apparent distress  Respiratory: clear to auscultation bilaterally, no wheezing  Cardiovascular: regular rate and rhythm  GI: soft and non-tender  Skin: warm and dry  Other: Rico catheter       Primary Care Physician   Marivel Barcenas NP    Discharge Orders      Home care nursing referral      Home Care PT Referral for Hospital Discharge      Home Care OT Referral for Hospital Discharge      Follow-up and recommended labs and tests     1. Follow up with primary care provider, Marivel Barcenas NP, within 7 days for hospital follow- up.  The following labs/tests are recommended: basic metabolic panel to ensure stable renal function.    2. Suture removal of the right foot to be done in 3 weeks.     Activity    Your activity upon discharge: activity as tolerated     Monitor and record    blood glucose 4 times a day, before meals and at bedtime     Tubes and drains    You are going home with the following tubes or drains: rico catheter.  Tube cares per hospital or home care instructions     Full Code     Diet    Follow this diet upon discharge: low carbohydrate diet       Significant Results and Procedures   Most Recent 3 CBC's:  Recent Labs   Lab Test 08/07/19  0855 08/06/19  1343 07/19/19  0832   WBC 9.1 10.7 7.3   HGB 9.7* 10.1* 10.5*   MCV 93 94 89   * 170 130*     Most Recent 3 BMP's:  Recent Labs   Lab Test 08/09/19  0834 08/07/19  0855 08/06/19  1343    140 141   POTASSIUM 4.2 4.5 4.7   CHLORIDE 110* 113* 111*   CO2 20 20 23   BUN 48* 60* 57*   CR 1.85* 2.07* 2.08*   ANIONGAP 9 7 7   ROGER 8.5 8.3* 8.5   GLC 93 88 132*   ,   Results for orders placed or performed during the hospital encounter of 08/06/19   CT Head w/o Contrast    Narrative    CT SCAN OF THE HEAD WITHOUT CONTRAST   8/6/2019 3:47 PM     HISTORY: Altered mental status. Dizziness and  giddiness.    TECHNIQUE: Axial images of the head and coronal reformations without  IV contrast material. Radiation dose for this scan was reduced using  automated exposure control, adjustment of the mA and/or kV according  to patient size, or iterative reconstruction technique.    COMPARISON: 7/15/2019.    FINDINGS: Mild to moderate cerebral atrophy is present. There are some  minimal patchy white matter changes in both hemispheres without mass  effect. There is no evidence for intracranial hemorrhage, mass effect,  acute infarct, or a skull fracture.      Impression    IMPRESSION: Chronic changes. No evidence for intracranial hemorrhage  or any acute process.    GIL TIAN MD    Renal Transplant    Narrative     RENAL TRANSPLANT 8/7/2019 10:15 AM    HISTORY: 73-year-old patient with acute kidney injury; request made  for evaluation of transplant kidney.    TECHNIQUE: Grayscale images obtained of the transplant kidney in the  right lower quadrant. Color Doppler and spectral waveform analysis  obtained of the transplant renal artery and inflow/outflow iliac  artery and vein.    FINDINGS: The transplant kidney is 12 x 4.7 x 6 cm with AP cortical  thickness of 1.4 cm. No hydronephrosis. No perinephric fluid. The main  renal vein is patent. Common iliac vein proximal to anastomosis and  iliac vein distal to anastomosis are both patent with normal venous  waveforms.    Main renal artery is patent. No significant velocity elevation to  suggest stenosis. The iliac artery proximal to anastomosis is 225/17  cm/second and distal to anastomosis is 129/12 cm/second. Bladder is  decompressed. Resistive indices are borderline elevated at 0.8,  previously elevated to 1.0. On last image provided, renal pelvis is  minimally prominent.      Impression    IMPRESSION:  1. Transplanted kidney appears relatively normal. No hydronephrosis or  surrounding perinephric fluid. The renal pelvis is borderline dilated  and if so, to a  trace degree.  2. Patent transplant renal artery and vein. Inflow and outflow artery  and vein also appear patent. No suggestion of significant arterial  stenosis.    RODRICK SUAREZ MD     *Note: Due to a large number of results and/or encounters for the requested time period, some results have not been displayed. A complete set of results can be found in Results Review.       Discharge Medications   Current Discharge Medication List      START taking these medications    Details   sulfamethoxazole-trimethoprim (BACTRIM DS/SEPTRA DS) 800-160 MG tablet Take 1 tablet by mouth 2 times daily for 7 days  Qty: 14 tablet, Refills: 0    Associated Diagnoses: Urinary tract infection associated with catheterization of urinary tract, unspecified indwelling urinary catheter type, initial encounter (H)         CONTINUE these medications which have CHANGED    Details   !! gabapentin (NEURONTIN) 100 MG capsule Take 1 capsule (100 mg) by mouth nightly as needed (severe pain)  Qty: 30 capsule, Refills: 0    Associated Diagnoses: Back pain, unspecified back location, unspecified back pain laterality, unspecified chronicity      !! gabapentin (NEURONTIN) 300 MG capsule Take 1 capsule (300 mg) by mouth 2 times daily    Associated Diagnoses: Back pain, unspecified back location, unspecified back pain laterality, unspecified chronicity      insulin aspart (NOVOLOG FLEXPEN) 100 UNIT/ML pen Take 5 units with meals + sliding scale if eating. Sliding scale only at bedtime when not eating. Max 40 units per day. Hold if pre-meal glucose is <90  Sliding scale:   140-199 2u  200-249 4u  250-299 6u  300-349 8u  350-399 10u  Over 400 12u  Qty: 45 mL, Refills: 3    Associated Diagnoses: Type 2 diabetes mellitus with diabetic polyneuropathy, with long-term current use of insulin (H)      insulin glargine (LANTUS SOLOSTAR) 100 UNIT/ML pen Inject 20 Units Subcutaneous At Bedtime  Qty: 45 mL, Refills: 3    Comments: If Lantus is not covered by  insurance, may substitute Basaglar at same dose and frequency.    Associated Diagnoses: Type 2 diabetes mellitus with diabetic polyneuropathy, with long-term current use of insulin (H)      levETIRAcetam (KEPPRA) 250 MG tablet Take 1 tablet (250 mg) by mouth daily for 5 days , then stop.  Qty: 5 tablet    Associated Diagnoses: Seizure disorder (H)       !! - Potential duplicate medications found. Please discuss with provider.      CONTINUE these medications which have NOT CHANGED    Details   acetaminophen (TYLENOL) 325 MG tablet Take 325-650 mg by mouth every 4 hours as needed       amLODIPine (NORVASC) 5 MG tablet Take 2 tablets (10 mg) by mouth daily  Qty: 60 tablet, Refills: 3    Associated Diagnoses: Benign essential hypertension      aspirin 81 MG tablet Take 81 mg by mouth daily      baclofen (LIORESAL) 10 MG tablet Take 1 tablet (10 mg) by mouth 2 times daily as needed for other (Bladder Spasms)  Qty: 60 tablet, Refills: 3    Associated Diagnoses: Urinary catheter (Johnson) change required      CRANBERRY PO Take 1 tablet by mouth 2 times daily       cyanocobalamin (CYANOCOBALAMIN) 1000 MCG/ML injection Inject 1 mL (1,000 mcg) into the muscle every 2 months  Qty: 3 mL, Refills: 3    Comments: Hold for refills, dose update  Associated Diagnoses: Iron deficiency anemia, unspecified iron deficiency anemia type      diphenoxylate-atropine (LOMOTIL) 2.5-0.025 MG tablet Take 1-2 tablets by mouth 4 times daily as needed for diarrhea  Qty: 360 tablet, Refills: 0    Associated Diagnoses: Diarrhea, unspecified type      famotidine (PEPCID) 20 MG tablet Take 1 tablet (20 mg) by mouth 2 times daily  Qty: 60 tablet, Refills: 1      labetalol (NORMODYNE) 300 MG tablet Take 0.5 tablets (150 mg) by mouth 2 times daily  Qty: 60 tablet, Refills: 1    Associated Diagnoses: Benign essential hypertension      Lactobacillus Rhamnosus, GG, (CULTURELL) capsule Take 1 capsule by mouth 2 times daily  Qty: 60 capsule, Refills: 11     Associated Diagnoses: Diarrhea      levothyroxine (SYNTHROID/LEVOTHROID) 50 MCG tablet TAKE ONE TABLET BY MOUTH EVERY DAY  Qty: 90 tablet, Refills: 0    Associated Diagnoses: Other specified hypothyroidism      lidocaine (XYLOCAINE) 2 % topical gel Apply topically as needed for moderate pain 10 ML every 3 weeks  Qty: 30 mL, Refills: 11    Associated Diagnoses: Johnson catheter in place      loratadine (CLARITIN) 10 MG tablet Take 1 tablet (10 mg) by mouth daily  Qty: 90 tablet, Refills: 3    Associated Diagnoses: Acute nasopharyngitis      menthol-zinc oxide (CALMOSEPTINE) 0.44-20.625 % OINT ointment Apply topically 4 times daily as needed for skin protection    Associated Diagnoses: Skin abrasion      ondansetron (ZOFRAN ODT) 4 MG ODT tab Take 1 tablet (4 mg) by mouth daily as needed for nausea 30 minutes before getting up in your wheelchair  Qty: 20 tablet, Refills: 1    Associated Diagnoses: Vertigo      pravastatin (PRAVACHOL) 10 MG tablet Take 1 tablet (10 mg) by mouth daily  Qty: 90 tablet, Refills: 3    Associated Diagnoses: Hyperlipidemia LDL goal <100      prazosin (MINIPRESS) 1 MG capsule Take 2 capsules (2 mg) by mouth At Bedtime  Qty: 60 capsule, Refills: 1    Associated Diagnoses: Nightmares      predniSONE (DELTASONE) 5 MG tablet Take 1 tablet (5 mg) by mouth daily  Qty: 90 tablet, Refills: 3    Associated Diagnoses: Kidney replaced by transplant      simethicone (MYLICON) 125 MG CHEW Take 1 tablet (125 mg) by mouth as needed for intestinal gas  Qty: 120 tablet      tacrolimus (GENERIC EQUIVALENT) 1 MG capsule Take 2 capsules (2 mg) by mouth 2 times daily  Qty: 120 capsule, Refills: 11    Associated Diagnoses: Kidney replaced by transplant      traMADol (ULTRAM) 50 MG tablet Take 1-2 tablets every 6 hours for post operative pain.  Qty: 20 tablet, Refills: 0    Associated Diagnoses: Post-operative pain      vitamin D3 (CHOLECALCIFEROL) 2000 units (50 mcg) tablet Take 4,000 Units by mouth 2 times daily       !! blood glucose (ACCU-CHEK COMPACT PLUS) test strip USE TO TEST BLOOD SUGAR THREE TIMES A DAY  Qty: 102 each, Refills: 11    Associated Diagnoses: Diabetic polyneuropathy associated with type 2 diabetes mellitus (H)      blood glucose (NO BRAND SPECIFIED) lancets standard Use to test blood sugar 3 times daily or as directed. (Accuchek guide)  Qty: 300 each, Refills: 3    Associated Diagnoses: Type 2 diabetes mellitus with hyperglycemia, with long-term current use of insulin (H)      !! blood glucose (NO BRAND SPECIFIED) test strip Use to test blood sugar 3 times daily or as directed. (accuchek guide)  Qty: 300 strip, Refills: 3    Associated Diagnoses: Type 2 diabetes mellitus with hyperglycemia, with long-term current use of insulin (H)      !! blood glucose (NO BRAND SPECIFIED) test strip Use to test blood sugar 3 times daily or as directed.  Qty: 700 strip, Refills: 11    Associated Diagnoses: Diabetic polyneuropathy associated with type 2 diabetes mellitus (H)      blood glucose calibration (NO BRAND SPECIFIED) solution Use to calibrate blood glucose monitor as needed as directed.(accuchek guide)  Qty: 3 Bottle, Refills: 3    Associated Diagnoses: Type 2 diabetes mellitus with hyperglycemia, with long-term current use of insulin (H)      blood glucose monitoring (NO BRAND SPECIFIED) meter device kit Use to test blood sugar three times daily or as directed.  Qty: 1 kit, Refills: 0    Comments: Dispense per insurance  Associated Diagnoses: Type 2 diabetes mellitus with hyperglycemia, with long-term current use of insulin (H)      COMFORT EZ PEN NEEDLES 31G X 6 MM miscellaneous USE 4 DAILY OR AS DIRECTED  Qty: 100 each, Refills: 3    Associated Diagnoses: Type 2 diabetes mellitus (H)      !! order for DME Equipment being ordered: CPAP mask, ResMed Ahuja FX, nasal pillows system, women's,   Catalog # [id number]    Please send to  DME  Qty: 1 Device, Refills: 3    Associated Diagnoses: Obstructive sleep apnea  "syndrome      !! order for DME Equipment being ordered: Tegaderm 4x4   MICHELLE 6 months  Sacral wound  Qty: 1 Box, Refills: 3    Associated Diagnoses: Decubitus ulcer of sacral region, unspecified ulcer stage      !! order for DME Equipment being ordered: [employer] Cooling gel-T cushion 20 inches wide & 18 inches deep    height 1.676 m (5' 6\"), weight 101.6 kg (223 lb 15.8 oz)  Qty: 1 each, Refills: 0    Associated Diagnoses: Back pain       !! - Potential duplicate medications found. Please discuss with provider.        Allergies   Allergies   Allergen Reactions     Atorvastatin Calcium      myalgias, muscle aches     Codeine Nausea and Vomiting     Darvocet [Propoxyphene N-Apap] Nausea and Vomiting     Hydromorphone      Levofloxacin Other (See Comments) and Diarrhea     hallucinations     Morphine      Nausea and vomiting     Niaspan [Niacin] GI Disturbance     Flushing even at low dose, GI upset     Percocet [Oxycodone-Acetaminophen]      Vomiting after taking med couple of times     Vicodin [Hydrocodone-Acetaminophen] Nausea and Vomiting     "

## 2019-08-11 ENCOUNTER — DOCUMENTATION ONLY (OUTPATIENT)
Dept: CARE COORDINATION | Facility: CLINIC | Age: 74
End: 2019-08-11

## 2019-08-11 NOTE — PROGRESS NOTES
.Dear Dr. Barcenas  Medicare Home Health regulations requires [facility] to provide an initial assessment visit either within 48 hours of the patient's return home, or on the physician ordered Start of Care date.    There will be a delay in the Initial Assessment for Gem MUÑIZ Jade; MRN [id number]  We anticipate the Start of care will be 8/11/19       Sincerely [facility]  Kassandra Tessy  [phone]

## 2019-08-12 ENCOUNTER — TELEPHONE (OUTPATIENT)
Dept: FAMILY MEDICINE | Facility: CLINIC | Age: 74
End: 2019-08-12

## 2019-08-12 ENCOUNTER — PATIENT OUTREACH (OUTPATIENT)
Dept: CARE COORDINATION | Facility: CLINIC | Age: 74
End: 2019-08-12

## 2019-08-12 NOTE — LETTER
[facility]  Complex Care Plan  About Me:    Patient Name:  Gem Shaver    YOB: 1945  Age:         73 year old   Josephine MRN:    [id number] Telephone Information:  Home Phone [phone]   Mobile [phone]       Address:  [address]  [place] [address] Email address:  [email or web address]      Emergency Contact(s)    Name Relationship Lgl Grd Work Phone Home Phone Mobile Phone   1. TAWANDA SHAVER Spouse No  [phone]   2. ANTONY VIVEROS* Daughter No none [phone]           Primary language:  English     needed? No   [facility]:  [phone] op. 1  Other communication barriers:    Preferred Method of Communication:  Mail  Current living arrangement:    Mobility Status/ Medical Equipment:      Health Maintenance  Health Maintenance Reviewed:      My Access Plan  Medical Emergency 911   Primary Clinic Line [facility] - [phone]   24 Hour Appointment Line [phone] or  [phone]) (toll-free)   24 Hour Nurse Line [phone] (toll-free)   Preferred Urgent Care     Preferred Hospital     Preferred Pharmacy [facility] [place] [facility] - [place], [place] - [facility] [address] Behavioral Health Crisis Line The National Suicide Prevention Lifeline at 1-297.681.3501 or 911             My Care Team Members  Patient Care Team       Relationship Specialty Notifications Start End    Marivel Barcenas APRN CNP PCP - General Family Practice  5/24/13     Phone: [phone] Fax: [phone]         [address] [facility] [address]    Monico Heck MD MD Transplant  10/6/15     nephrology - [phone]    Phone: [phone] Fax: [phone]         [address] [facility] [address] [facility] [address]    Ross Elizabeth MD MD Nephrology  5/9/17     Phone: [phone] Fax: [phone]         [address] [facility] [address] [facility] [address] [facility] [address]    Swapna  Marivel Salgado, APRN CNP Assigned PCP   7/8/18     Phone: [phone] Fax: [phone]         [address] [facility] [address]    Juan Antonio Luna, EMT Community Paramedic  Admissions 8/28/18     [facility] Home Care Nurse   5/7/19     provider couldn't remember nurse's name    Phone: [phone]         Conchita Toledo, [facility] Pharmacist Pharmacist  5/7/19     Phone: [phone] Fax: [phone]         [address] [facility] [address]    Kassandra Khalil, RN Lead Care Coordinator Primary Care - CC Admissions 7/22/19     Phone: [phone] Fax: [phone]        Breana Moctezuma, EMT Community Paramedic  Admissions 7/26/19             My Care Plans  Self Management and Treatment Plan  Goals and (Comments)  Goals        General    Medical (pt-stated)     Notes - Note edited  7/22/2019 10:15 AM by Kassandra Khalil, RN    #1-Goal Statement: I will reduce the number of UTI's I experience  Measure of Success: reduction in number of UTI's  Supportive Steps to Achieve: home care nurse to request orders for more frequent catheter changes and increased pericare per week  Barriers: resistant UTI's  Strengths: assistance as above  Date to Achieve By: 12-1-19               Action Plans on File:            Depression          Advance Care Plans/Directives Type:        My Medical and Care Information  Problem List   Patient Active Problem List   Diagnosis     Kidney replaced by transplant     Primary localized osteoarthrosis, lower leg     Pulmonary embolism and infarction (H)     Diabetes mellitus with background retinopathy (H)     Hyperlipidemia LDL goal <100     Diabetic gastroparesis (H)     Back pain     Compression fx, thoracic spine (H)     Anemia     Clostridium difficile diarrhea     Hydronephrosis     Recurrent UTI     Benign essential hypertension     Vancomycin resistant Enterococcus     Clostridium difficile colitis     Diabetic polyneuropathy associated with  type 2 diabetes mellitus (H)     Chronic shoulder pain, unspecified laterality     Morbid obesity, unspecified obesity type (H)     Health Care Home     Paroxysmal A-fib (H)     Acute kidney injury (H)     Pancytopenia, acquired (H)     Obstructive sleep apnea syndrome     Complicated UTI (urinary tract infection)     Altered Mental Status, Probable Metabolic encephalopathy     Chronic indwelling Johnson catheter     Seizure disorder (H)     Dermatitis     Encephalopathy acute     Type 2 diabetes mellitus with diabetic polyneuropathy, with long-term current use of insulin (H)     Sepsis (H)     Overdose, accidental or unintentional, subsequent encounter     CKD (chronic kidney disease) stage 3, GFR 30-59 ml/min (H)     Paranoia (H)     Altered mental status     UTI (urinary tract infection)      Current Medications and Allergies:  See printed Medication Report.    Care Coordination Start Date: 7/22/2019   Frequency of Care Coordination:     Form Last Updated: 08/14/2019

## 2019-08-12 NOTE — PROGRESS NOTES
Clinic Care Coordination Contact  [facility]/Voicemail       Clinical Data: Care Coordinator Outreach  Outreach attempted x 1.  Left message with spouse that I will try again later. Patient currently sleeping  Plan:  Care Coordinator will try to reach patient again in 1-2 business days.    Porsha Khalil RN  [facility]-Care Coordination  [facility]  [facility]  [phone]

## 2019-08-12 NOTE — TELEPHONE ENCOUNTER
Reason for Call:  Home Health Care    Geri with [facility] called regarding (reason for call): orders    Orders are needed for this patient. Skilled Nursing & Home Health Aide    PT: N/A    OT: N/A    Skilled Nursinx/wk for 3 wks then weekly after that for Catheter. RN would also like clarification on if catheter is still to be changed ever 3 wks.    HHA: 3x/wk for bathing    Pt Provider:     Phone Number Homecare Nurse can be reached at: [phone]    Can we leave a detailed message on this number? YES    Phone number patient can be reached at: Home number on file [phone] (home)    Best Time: anytime    Call taken on 2019 at 9:38 AM by Delma Flores

## 2019-08-12 NOTE — TELEPHONE ENCOUNTER
Please see care coordination note for further follow up.     Porsha Khalil, SEKOU  [facility]-Care Coordination  [facility] Primary [facility]  [facility]  [phone]

## 2019-08-13 ENCOUNTER — TELEPHONE (OUTPATIENT)
Dept: FAMILY MEDICINE | Facility: CLINIC | Age: 74
End: 2019-08-13

## 2019-08-13 NOTE — TELEPHONE ENCOUNTER
Reason for Call:  Home Health Care    Cornelia with [facility] called regarding (reason for call): Orders for Irrigating her rico catheter     Orders are needed for this patient. Skilled Nursing  PT: N/A    OT: N/A    Skilled Nursing: Irrigating as needed, but she would also like to get a call from the nurse to specified how much and how often and what type of fluid to irrigate with.     Pt Provider: Marivel Clements     Phone Number Homecare Nurse can be reached at: [phone]    Can we leave a detailed message on this number? YES    Phone number patient can be reached at: Home number on file [phone] (home)    Best Time: Anytime     Call taken on 8/13/2019 at 1:45 PM by Shoshana Shafer

## 2019-08-13 NOTE — LETTER
To whom it may concern,    I am the PCP for Shantelle Jade.  Sahntelle has significant medical issues which have lead to her     bedbound status and prevent her from even getting up in a wheelchair.  In addition her     condition can change rapidly so she can not be transported by medically unskilled personnel.      Due to this she needs to be transported by ambulance.        If you have any questions please feel free to contact me.    ANTHONY Mckee,  MEDICAL DIRECTOR

## 2019-08-14 ENCOUNTER — TELEPHONE (OUTPATIENT)
Dept: PODIATRY | Facility: CLINIC | Age: 74
End: 2019-08-14

## 2019-08-14 NOTE — TELEPHONE ENCOUNTER
Please call Cornelia and ask her to reach out to Shantelle's CM as to what they have done in the past.  I am not sure where the order to irrigate came from.  Generally we only irrigate regularly if there is a lot of sediment or issues with leaking/clogging.    Klaudia MCFARLANEP

## 2019-08-14 NOTE — TELEPHONE ENCOUNTER
Reason for call:  Other   Patient called regarding (reason for call): call back  Additional comments: Patient would like to speak to a nurse about the dressing on her foot.  Phone number to reach patient:  Home number on file [phone] (home)    Best Time:  any    Can we leave a detailed message on this number?  YES

## 2019-08-14 NOTE — TELEPHONE ENCOUNTER
Left message from PCP on Cornelia's [facility] RN VM & spoke with Berenice her new RNCM & relayed information as well.  Marivel Klein RN

## 2019-08-15 NOTE — TELEPHONE ENCOUNTER
Per chart review, patient had R hallux amputation on 7/19/19 by Dr. Jefferson.   Per progress note plan dated 8/7/19 from Dr. Jefferson:  Sterile re-dress of the right foot  This dressing can be left on  Suture removal at 3-4 weeks post op by her home health NP    Phone call to home number and spoke with , Chang. Patient is currently sleeping. Asked that he have her return call and triage number provided.   He verbalized understanding.     ASHOK Davis RN

## 2019-08-15 NOTE — PROGRESS NOTES
Clinic Care Coordination Contact    Clinic Care Coordination Contact  OUTREACH    Referral Information:        Chief Complaint   Patient presents with     Clinic Care Coordination - [facility]     RN        [place] Utilization: frequent hospital admissions     Utilization    Last refreshed: 8/15/2019  7:16 AM:  Hospital Admissions 6           Last refreshed: 8/15/2019  7:16 AM:  ED Visits 0           Last refreshed: 8/15/2019  7:16 AM:  No Show Count (past year) 5              Current as of: 8/15/2019  7:16 AM              Clinical Concerns:  Current Medical Concerns:  Admitted to hospital from home with altered mental status. Found to have UTI. Was treated with antibiotics and feels well today. Had toe amputation 3 weeks ago. Bandage was changed during hospitalization but she wonders when it should be changed again. Home care nurse called clinic asking about catheter irrigation plan as patient states she gets a lot of sediment which leads to clogged catheter. She notes she now has a new home health aide and home care nurse. She is unsure of why staff changed   Current Behavioral Concerns: none    Education Provided to patient: none      Health Maintenance Reviewed: Due/Overdue   Health Maintenance Due   Topic Date Due     URINE DRUG SCREEN  1945     EYE EXAM  1945     COLONOSCOPY  11/19/1956     MAMMO SCREENING  06/11/2000     MEDICARE ANNUAL WELLNESS VISIT  11/19/2011     ZOSTER IMMUNIZATION (2 of 3) 08/12/2013     DIABETIC FOOT EXAM  07/13/2014     LIPID  12/05/2017     MICROALBUMIN  04/26/2018     PHQ-9  12/28/2018     LISSY ASSESSMENT  06/28/2019     FALL RISK ASSESSMENT  06/28/2019       Clinical Pathway: None    Medication Management:   sets up medications. She admits to intermittent compliance; doesn't like pills     Functional Status:     Completely bedbound  Living Situation:     Lives with  in [facility] level home in [place]. She lives downstairs and her  typically  stays upstairs.   Diet/Exercise/Sleep:  Unable to exercise. Sometimes sleeps too much     Transportation:   EMS transport only        Psychosocial:   no concerns     Financial/Insurance:      Not discussed     Resources and Interventions:  Current Resources:    ;                         Goals:   Goals        General    Medical (pt-stated)     Notes - Note edited  7/22/2019 10:15 AM by Kassandra Khalil RN    #1-Goal Statement: I will reduce the number of UTI's I experience  Measure of Success: reduction in number of UTI's  Supportive Steps to Achieve: home care nurse to request orders for more frequent catheter changes and increased pericare per week  Barriers: resistant UTI's  Strengths: assistance as above  Date to Achieve By: 12-1-19                Patient/Caregiver understanding: good           Plan: will discuss suture removal plan with PCP. Patient will call podiatrist, Dr. Jefferson, and ask about toe amputation site bandage. She agrees with plan.     Porsha Khalil, RN  [facility]-Care Coordination  [facility]  [facility]  [phone]

## 2019-08-16 NOTE — TELEPHONE ENCOUNTER
Dressing does not need to be changed. Her home health care nurse can remove the sutures now.  After suture removal, a light dressing or large band-aid should be applied for one day.     Thank you.    Dr. Jefferson

## 2019-08-16 NOTE — TELEPHONE ENCOUNTER
Phone call to patient and informed of Dr. Jefferson's recommendations below. She was confused that she could get the sutures out now when she was previously told to wait 3-4 weeks. She states she will contact her nurse practitioner who is her primary care provider. She is separate from the home nurse that sees her at home several times a week. NP was originally going to come to her home to remove sutures, so she will contact her to come sooner.   Asked that she have NP call us if questions.   She verbalized understanding.     ASHOK Davis RN

## 2019-08-16 NOTE — TELEPHONE ENCOUNTER
Phone call to patient and initially spoke with , Chang. Patient was sleeping but was able to take the call.   She has difficulty hearing on her phone and writer had to repeat herself several times.   Patient unsure of when dressing needs changed again. She has a home are nurse that comes several times a week and will be coming today.   Will discuss with Dr. Jefferson if dressing is to remain until dressing change and get back with her.     She can be reached at: [phone]  Ok to leave message with Chang, but she prefers he wake her up.     Please advise.     ASHOK Davis RN

## 2019-08-19 ENCOUNTER — TELEPHONE (OUTPATIENT)
Dept: PODIATRY | Facility: CLINIC | Age: 74
End: 2019-08-19

## 2019-08-19 ENCOUNTER — TELEPHONE (OUTPATIENT)
Dept: FAMILY MEDICINE | Facility: CLINIC | Age: 74
End: 2019-08-19

## 2019-08-19 NOTE — TELEPHONE ENCOUNTER
Routed to Dr. Jefferson to advise.    Gavino Galicia, [facility]  Podiatry/Foot & Ankle Surgery  [facility]

## 2019-08-19 NOTE — TELEPHONE ENCOUNTER
Spoke with pt, she is fine with HC RN taking out sutures, note by Dr Jefferson confirms its ok for them to be removed on 8/16/19. F/U appt with PCP 8/28/19 @ 11.  Marivel Klein RN

## 2019-08-19 NOTE — TELEPHONE ENCOUNTER
8/19/2019 at 3:16 PM    Reason for call:  Appt    Outgoing call to patient (per provider's request) to see if patient wanted 11:00 appt on 8/28/2019. Patient stated her home care nurse (Berenice) was at her home, home care nurse overheard call, and said she could remove the sutures tomorrow (if provider's ok with that). Patient is fine having RN remove sutures and stated if provider wants to see her, she is fine waiting and having sutures removed by her instead. Per patient, if provider wants to see her, requests clinic call her back and let her know.    No appt has been made at this time.    Pt tel:  Home number on file [phone]    Yohana [place]  Patient Representative  [facility]

## 2019-08-19 NOTE — TELEPHONE ENCOUNTER
Reason for Call: Request for an order or referral:    Order or referral being requested: remove sutures from rt foot    Date needed: as soon as possible    Has the patient been seen by the PCP for this problem? YES    Additional comments: Nurse is going to pt's home 8/20/19.    Phone number Patient can be reached at:  Other phone number:  [phone]    Best Time:  Before 5pm    Can we leave a detailed message on this number?  YES    Call taken on 8/19/2019 at 12:39 PM by JUAN MANUEL GA

## 2019-08-20 NOTE — TELEPHONE ENCOUNTER
I called and spoke with her home health care RN.      Verbal orders:  1) prep incision site with betadine or alcohol  2) okay to remove all sutures.  3) apply a light dressing  4) Gem's foot should be kept dry until tomorrow (no bathing).     Armen Jefferson DPM, FACFAS, MS    [place] [facility] of Podiatry/Foot & Ankle Surgery

## 2019-08-28 NOTE — PATIENT INSTRUCTIONS
We have reordered your lidocaine    I have placed an order for your electric christiana     We will have our pharmD call you to see if any meds are contributing to your vertigo     Continue your meds as directed     We will try and get your blood drawn    I will contact Dr. Elizabeth about a UTI preventive

## 2019-08-28 NOTE — PROGRESS NOTES
SUBJECTIVE:                                                    Gem Jade is a 73 year old female who is seen in the home due to inability to get out without assistance, bedbound, obesity, DM, kidney transplant,  for the following health issues:         Hospital Follow-up Visit:    Hospital/Nursing Home/IP Rehab Facility: [facility]  Date of Admission: 8/6/19  Date of Discharge: 8/9/19  Reason(s) for Admission: UTI             Problems taking medications regularly:  None       Medication changes since discharge: back on lantus to 30iu, reduced her gabapentin to 300mg 2x daily and 100mg at bedtime, off keppra,         Problems adhering to non-medication therapy:  None  Summary of hospitalization:  [facility] discharge summary reviewed  Diagnostic Tests/Treatments reviewed.  Follow up needed: BMP  Other Healthcare Providers Involved in Patient s Care:         Homecare and [facility]  Update since discharge: improved. Sugars elevated so increased her insulin, forgot insulin for 3 days so some highs 179,169,309,82,62,118,191,84,89,125,171, increase in Vertigo having to use Zofran, unsure why, wondering if related to medications no longer taking tramadol, not eating well, drinking less fluids, stitches out wound healing nicely, discussed UTI prevention, pain at baseline     Post Discharge Medication Reconciliation: discharge medications reconciled and changed, per note/orders (see AVS).  Plan of care communicated with patient and family     Coding guidelines for this visit:  Type of Medical   Decision Making Face-to-Face Visit       within 7 Days of discharge Face-to-Face Visit        within 14 days of discharge   Moderate Complexity [id number]   High Complexity [id number]          Hospital Course     Gem Jade is a 73 year old female who was admitted on 8/6/2019.     Catheter associated urinary tract infection.      She was initially treated with Ceftriaxone pending culture results. Urine  culture>100k citrobacter sensitive to bactrim. She will discharge on Bacrim x 7 more days to complete course. Johnson has been exchanged on admission. Continues on PTA baclofen prn for bladder spasms.     Metabolic encephalopathy.    Likely due to infection and meds especially gabapentin contributing .  CT scan demonstrates mild to moderate cerebral atrophy without acute changes. Mentation has improved. Following med dose are made.  - reduced gabapentin from 600mg BID to 300mg BID and 300mg at bedtime to 100mg at bedtime PRN          Acute kidney injury.      CKD III-IV with Baseline creatinine approximately 1.5-1.8  Hx of renal transplant  Cr still elevated at 2.08 on admission. Transplant US without acute finding. She received IVF.   - cr 1.85 on day of discharge.   - continue with PTA Prednisone and Tacrolimus  - f/u BMP in a week        Status post right hallux amputation on 7/19/19 for treatment of osteomyelitis.  - seen by podiatry this admission. Recommend suture removal in 3-4 weeks by home health NP     Diabetes mellitus type 2.  A1c 6.8  PTA 30 units Lantus, Aspart 10 units TID w/meals and sliding scale insulin  - reduced Lantus to 20 units daily and mealtime to 5 units TID  - will continue with PTA sliding scale insulin       Hypertension.  Blood pressure well controlled on amlodipine, labetalol, and prazosin.     Hypothyroidism.  Continue levothyroxine.     Chronic anemia.  Hemoglobin is at baseline.     Chronic pain.  Continue acetaminophen, gabapentin reduced as above, and as needed tramadol.     Hyperlipidemia.  Continue pravastatin.     Seizure disorder.  The patient's Keppra dose was reduced by neurology in July following an episode of unconsciousness as the level was high. Seen by neurology this hospital stay. Recommend tapering off Keppra. She will continue with 250mg daily x 5 more days then stop per neuro recs     Bed bound: patient is bed bound.   provides care for her as well as home  services      updated over the phone regarding discharge planning and med changes.     Will resume PTA home health services at discharge       Problems taking medications regularly: Yes,  problems remembering to take    Medication side effects: increase in Vertigo    Diet: diabetic    Social History     Tobacco Use     Smoking status: Former Smoker     Years: 17.00     Last attempt to quit: 1987     Years since quittin.2     Smokeless tobacco: Never Used   Substance Use Topics     Alcohol use: No        Problem list and histories reviewed & adjusted, as indicated.  Additional history: as documented    Patient Active Problem List   Diagnosis     Kidney replaced by transplant     Primary localized osteoarthrosis, lower leg     Pulmonary embolism and infarction (H)     Diabetes mellitus with background retinopathy (H)     Hyperlipidemia LDL goal <100     Diabetic gastroparesis (H)     Back pain     Compression fx, thoracic spine (H)     Anemia     Clostridium difficile diarrhea     Hydronephrosis     Recurrent UTI     Benign essential hypertension     Vancomycin resistant Enterococcus     Clostridium difficile colitis     Diabetic polyneuropathy associated with type 2 diabetes mellitus (H)     Chronic shoulder pain, unspecified laterality     Morbid obesity, unspecified obesity type (H)     Health Care Home     Paroxysmal A-fib (H)     Acute kidney injury (H)     Pancytopenia, acquired (H)     Obstructive sleep apnea syndrome     Complicated UTI (urinary tract infection)     Altered Mental Status, Probable Metabolic encephalopathy     Chronic indwelling Johnson catheter     Seizure disorder (H)     Dermatitis     Encephalopathy acute     Type 2 diabetes mellitus with diabetic polyneuropathy, with long-term current use of insulin (H)     Sepsis (H)     Overdose, accidental or unintentional, subsequent encounter     CKD (chronic kidney disease) stage 3, GFR 30-59 ml/min (H)     Paranoia (H)     Altered  mental status     UTI (urinary tract infection)     Past Surgical History:   Procedure Laterality Date     AMPUTATE TOE(S) Right 2019    Procedure: RIGHT FIRST TOE AMPUTATION;  Surgeon: Armen Jefferson DPM;  Location: [facility] OR     C NONSPECIFIC PROCEDURE  10/99    kidney transplant     C NONSPECIFIC PROCEDURE      MRI head, lumbar spine, pelvis     C TOTAL KNEE ARTHROPLASTY  3/03    Knee Replacement, Total right, post op PE     CHOLECYSTECTOMY       HC LEFT HEART CATHETERIZATION      Cardiac Cath, Left Heart, Negative  (@ [facility])     HC LEFT HEART CATHETERIZATION      normal coronary angiogram at [facility], had mild troponin rise (0.71)     HC REMV CATARACT EXTRACAP,INSERT LENS      bilateral cataract repaired, left eye        Social History     Tobacco Use     Smoking status: Former Smoker     Years: 17.00     Last attempt to quit: 1987     Years since quittin.2     Smokeless tobacco: Never Used   Substance Use Topics     Alcohol use: No     Family History   Problem Relation Age of Onset     Diabetes Mother      Parkinsonism Mother      Diabetes Brother      Hypertension Father         [id number] pneumonia     Heart Disease Father            Current Outpatient Medications   Medication Sig Dispense Refill     lidocaine (XYLOCAINE) 2 % external gel Apply topically 3 times daily 85 mL 11     order for DME Equipment being ordered: [employer] Mobility System, Titan 500, free standing overhead patient lift    Please send to  DME 1 Device 0     acetaminophen (TYLENOL) 325 MG tablet Take 325-650 mg by mouth every 4 hours as needed        amLODIPine (NORVASC) 5 MG tablet Take 2 tablets (10 mg) by mouth daily 60 tablet 3     aspirin 81 MG tablet Take 81 mg by mouth daily       baclofen (LIORESAL) 10 MG tablet Take 1 tablet (10 mg) by mouth 2 times daily as needed for other (Bladder Spasms) 60 tablet 3     blood glucose (ACCU-CHEK COMPACT PLUS) test strip USE TO TEST BLOOD SUGAR THREE TIMES A  each  11     blood glucose (NO BRAND SPECIFIED) lancets standard Use to test blood sugar 3 times daily or as directed. (Accuchek guide) 300 each 3     blood glucose (NO BRAND SPECIFIED) test strip Use to test blood sugar 3 times daily or as directed. (accuchek guide) 300 strip 3     blood glucose (NO BRAND SPECIFIED) test strip Use to test blood sugar 3 times daily or as directed. 700 strip 11     blood glucose calibration (NO BRAND SPECIFIED) solution Use to calibrate blood glucose monitor as needed as directed.(accuchek guide) 3 Bottle 3     blood glucose monitoring (NO BRAND SPECIFIED) meter device kit Use to test blood sugar three times daily or as directed. 1 kit 0     COMFORT EZ PEN NEEDLES 31G X 6 MM miscellaneous USE 4 DAILY OR AS DIRECTED 100 each 3     CRANBERRY PO Take 1 tablet by mouth 2 times daily        cyanocobalamin (CYANOCOBALAMIN) 1000 MCG/ML injection Inject 1 mL (1,000 mcg) into the muscle every 2 months 3 mL 3     diphenoxylate-atropine (LOMOTIL) 2.5-0.025 MG tablet Take 1-2 tablets by mouth 4 times daily as needed for diarrhea 360 tablet 0     famotidine (PEPCID) 20 MG tablet Take 1 tablet (20 mg) by mouth 2 times daily 60 tablet 1     gabapentin (NEURONTIN) 100 MG capsule Take 1 capsule (100 mg) by mouth nightly as needed (severe pain) 30 capsule 0     gabapentin (NEURONTIN) 300 MG capsule Take 1 capsule (300 mg) by mouth 2 times daily       insulin aspart (NOVOLOG FLEXPEN) 100 UNIT/ML pen Take 5 units with meals + sliding scale if eating. Sliding scale only at bedtime when not eating. Max 40 units per day. Hold if pre-meal glucose is <90  Sliding scale:   140-199 2u  200-249 4u  250-299 6u  300-349 8u  350-399 10u  Over 400 12u 45 mL 3     insulin glargine (LANTUS SOLOSTAR) 100 UNIT/ML pen Inject 20 Units Subcutaneous At Bedtime 45 mL 3     labetalol (NORMODYNE) 300 MG tablet Take 0.5 tablets (150 mg) by mouth 2 times daily 60 tablet 1     Lactobacillus Rhamnosus, GG, (CULTURELL) capsule Take 1  "capsule by mouth 2 times daily 60 capsule 11     levothyroxine (SYNTHROID/LEVOTHROID) 50 MCG tablet TAKE ONE TABLET BY MOUTH EVERY DAY 90 tablet 0     loratadine (CLARITIN) 10 MG tablet Take 1 tablet (10 mg) by mouth daily 90 tablet 3     menthol-zinc oxide (CALMOSEPTINE) 0.44-20.625 % OINT ointment Apply topically 4 times daily as needed for skin protection       ondansetron (ZOFRAN ODT) 4 MG ODT tab Take 1 tablet (4 mg) by mouth daily as needed for nausea 30 minutes before getting up in your wheelchair 20 tablet 1     order for DME Equipment being ordered: CPAP mask, [employer] FX, nasal pillows system, women's,   Catalog # [id number]    Please send to  DME 1 Device 3     order for DME Equipment being ordered: Tegaderm 4x4   MICHELLE 6 months  Sacral wound 1 Box 3     order for DME Equipment being ordered: Kobojo gel-T cushion 20 inches wide & 18 inches deep    height 1.676 m (5' 6\"), weight 101.6 kg (223 lb 15.8 oz) 1 each 0     pravastatin (PRAVACHOL) 10 MG tablet Take 1 tablet (10 mg) by mouth daily 90 tablet 3     prazosin (MINIPRESS) 1 MG capsule Take 2 capsules (2 mg) by mouth At Bedtime (Patient taking differently: Take 1-2 mg by mouth nightly as needed ) 60 capsule 1     predniSONE (DELTASONE) 5 MG tablet Take 1 tablet (5 mg) by mouth daily 90 tablet 3     simethicone (MYLICON) 125 MG CHEW Take 1 tablet (125 mg) by mouth as needed for intestinal gas 120 tablet      tacrolimus (GENERIC EQUIVALENT) 1 MG capsule Take 2 capsules (2 mg) by mouth 2 times daily 120 capsule 11     traMADol (ULTRAM) 50 MG tablet Take 1-2 tablets every 6 hours for post operative pain. 20 tablet 0     vitamin D3 (CHOLECALCIFEROL) 2000 units (50 mcg) tablet Take 4,000 Units by mouth 2 times daily       Allergies   Allergen Reactions     Atorvastatin Calcium      myalgias, muscle aches     Codeine Nausea and Vomiting     Darvocet [Propoxyphene N-Apap] Nausea and Vomiting     Hydromorphone      Levofloxacin Other (See Comments) and " Diarrhea     hallucinations     Morphine      Nausea and vomiting     Niaspan [Niacin] GI Disturbance     Flushing even at low dose, GI upset     Percocet [Oxycodone-Acetaminophen]      Vomiting after taking med couple of times     Vicodin [Hydrocodone-Acetaminophen] Nausea and Vomiting     Recent Labs   Lab Test 08/09/19  0834 08/07/19  0855  07/19/19  0832  07/15/19  1546 07/01/19  1208  06/04/19  0739 06/03/19  0730  04/24/19  1258  02/28/19  0612  12/05/16  1210  02/05/15  1250  05/16/13  1240   A1C  --  6.8*  --   --   --   --   --   --   --   --   --  6.2*  --  7.3*   < > 7.4*   < > 6.9*   < > 7.2*   LDL  --   --   --   --   --   --   --   --   --   --   --   --   --   --   --  64  --  18  --  149*   HDL  --   --   --   --   --   --   --   --   --   --   --   --   --   --   --  46*  --  43*  --  42*   TRIG  --   --   --   --   --   --   --   --   --   --   --   --   --   --   --  272*  --  223*  --  262*   ALT  --   --   --  11  --  18  --   --  21  --    < > 22  --   --    < >  --    < >  --    < >  --    CR 1.85* 2.07*   < > 1.53*   < > 1.98* 1.68*   < > 1.57*  --    < > 1.78*   < > 2.26*   < >  --    < > 0.67   < > 0.70   GFRESTIMATED 27* 23*   < > 33*   < > 25* 30*   < > 32*  --    < > 28*   < > 21*   < >  --    < > 87   < > 84   GFRESTBLACK 31* 27*   < > 39*   < > 28* 35*   < > 38*  --    < > 32*   < > 24*   < >  --    < > >90   GFR Calc     < > >90   POTASSIUM 4.2 4.5   < > 4.1   < > 4.8 4.5   < > 4.3  --    < > 4.5   < > 5.5*   < >  --    < > 4.3   < > 3.9   TSH  --   --   --   --   --   --  2.43  --   --  1.28  --   --   --   --    < >  --    < >  --    < >  --     < > = values in this interval not displayed.      BP Readings from Last 3 Encounters:   08/28/19 120/60   08/09/19 135/63   07/31/19 138/68    Wt Readings from Last 3 Encounters:   08/28/19 103.9 kg (229 lb)   07/21/19 107.9 kg (237 lb 14 oz)   06/14/19 113.9 kg (251 lb)        Labs reviewed in EPIC  Problem list, Medication  "list, Allergies, and Medical/Social/Surgical histories reviewed in [facility] and updated as appropriate.     ROS: Constitutional, neuro, ENT, endocrine, pulmonary, cardiac, gastrointestinal, genitourinary, musculoskeletal, integument and psychiatric systems are negative, except as otherwise noted above in the HPI.       OBJECTIVE:                                                    /60 (BP Location: Right arm, Patient Position: Supine, Cuff Size: Adult Regular)   Pulse 67   Ht 1.702 m (5' 7\")   Wt 103.9 kg (229 lb)   SpO2 99%   BMI 35.87 kg/m    Body mass index is 35.87 kg/m .    GENERAL: healthy, alert and mild distress  RESP: lungs clear to auscultation - no rales, rhonchi or wheezes  CV: regular rate and rhythm, normal S1 S2, no S3 or S4, no murmur, click or rub, no peripheral edema and peripheral pulses strong  ABDOMEN: soft, nontender, no hepatosplenomegaly, no masses and bowel sounds normal  MS: no gross musculoskeletal defects noted, no edema  SKIN: no suspicious lesions or rashes    Mental Status Assessment:  Appearance:   Appropriate   Eye Contact:   Fair   Psychomotor Behavior: Normal   Attitude:   Cooperative   Orientation:   All  Speech   Rate / Production: Normal    Volume:  Normal   Mood:    Anxious   Affect:    Labile   Thought Content:  Clear  Rumination   Thought Form:  Coherent  Goal Directed   Insight:    Fair   Attention Span and Concentration:  limited  Recent and Remote Memory:  intact  Fund of Knowledge: appropriate  Muscle Strength and Tone: normal   Suicidal Ideation: reports no thoughts, no intention  Hallucination: No  Paranoid-No  Manic-No  Panic-No  Self harm-No        Diagnostic Test Results:  Results for orders placed or performed during the hospital encounter of 08/06/19   CT Head w/o Contrast    Narrative    CT SCAN OF THE HEAD WITHOUT CONTRAST   8/6/2019 3:47 PM     HISTORY: Altered mental status. Dizziness and giddiness.    TECHNIQUE: Axial images of the head and coronal " reformations without  IV contrast material. Radiation dose for this scan was reduced using  automated exposure control, adjustment of the mA and/or kV according  to patient size, or iterative reconstruction technique.    COMPARISON: 7/15/2019.    FINDINGS: Mild to moderate cerebral atrophy is present. There are some  minimal patchy white matter changes in both hemispheres without mass  effect. There is no evidence for intracranial hemorrhage, mass effect,  acute infarct, or a skull fracture.      Impression    IMPRESSION: Chronic changes. No evidence for intracranial hemorrhage  or any acute process.    GIL TIAN MD    Renal Transplant    Narrative     RENAL TRANSPLANT 8/7/2019 10:15 AM    HISTORY: 73-year-old patient with acute kidney injury; request made  for evaluation of transplant kidney.    TECHNIQUE: Grayscale images obtained of the transplant kidney in the  right lower quadrant. Color Doppler and spectral waveform analysis  obtained of the transplant renal artery and inflow/outflow iliac  artery and vein.    FINDINGS: The transplant kidney is 12 x 4.7 x 6 cm with AP cortical  thickness of 1.4 cm. No hydronephrosis. No perinephric fluid. The main  renal vein is patent. Common iliac vein proximal to anastomosis and  iliac vein distal to anastomosis are both patent with normal venous  waveforms.    Main renal artery is patent. No significant velocity elevation to  suggest stenosis. The iliac artery proximal to anastomosis is 225/17  cm/second and distal to anastomosis is 129/12 cm/second. Bladder is  decompressed. Resistive indices are borderline elevated at 0.8,  previously elevated to 1.0. On last image provided, renal pelvis is  minimally prominent.      Impression    IMPRESSION:  1. Transplanted kidney appears relatively normal. No hydronephrosis or  surrounding perinephric fluid. The renal pelvis is borderline dilated  and if so, to a trace degree.  2. Patent transplant renal artery and vein. Inflow  and outflow artery  and vein also appear patent. No suggestion of significant arterial  stenosis.    RODRICK SUAREZ MD   CBC with platelets differential   Result Value Ref Range    WBC 10.7 4.0 - 11.0 10e9/L    RBC Count 3.49 (L) 3.8 - 5.2 10e12/L    Hemoglobin 10.1 (L) 11.7 - 15.7 g/dL    Hematocrit 32.8 (L) 35.0 - 47.0 %    MCV 94 78 - 100 fl    MCH 28.9 26.5 - 33.0 pg    MCHC 30.8 (L) 31.5 - 36.5 g/dL    RDW 15.1 (H) 10.0 - 15.0 %    Platelet Count 170 150 - 450 10e9/L    Diff Method Automated Method     % Neutrophils 78.0 %    % Lymphocytes 14.5 %    % Monocytes 5.0 %    % Eosinophils 2.1 %    % Basophils 0.1 %    % Immature Granulocytes 0.3 %    Absolute Neutrophil 8.4 (H) 1.6 - 8.3 10e9/L    Absolute Lymphocytes 1.6 0.8 - 5.3 10e9/L    Absolute Monocytes 0.5 0.0 - 1.3 10e9/L    Absolute Eosinophils 0.2 0.0 - 0.7 10e9/L    Absolute Basophils 0.0 0.0 - 0.2 10e9/L    Abs Immature Granulocytes 0.0 0 - 0.4 10e9/L   Basic metabolic panel   Result Value Ref Range    Sodium 141 133 - 144 mmol/L    Potassium 4.7 3.4 - 5.3 mmol/L    Chloride 111 (H) 94 - 109 mmol/L    Carbon Dioxide 23 20 - 32 mmol/L    Anion Gap 7 3 - 14 mmol/L    Glucose 132 (H) 70 - 99 mg/dL    Urea Nitrogen 57 (H) 7 - 30 mg/dL    Creatinine 2.08 (H) 0.52 - 1.04 mg/dL    GFR Estimate 23 (L) >60 mL/min/[1.73_m2]    GFR Estimate If Black 27 (L) >60 mL/min/[1.73_m2]    Calcium 8.5 8.5 - 10.1 mg/dL   UA with Microscopic   Result Value Ref Range    Color Urine Light Yellow     Appearance Urine Cloudy     Glucose Urine 70 (A) NEG^Negative mg/dL    Bilirubin Urine Negative NEG^Negative    Ketones Urine Negative NEG^Negative mg/dL    Specific Gravity Urine 1.012 1.003 - 1.035    Blood Urine Small (A) NEG^Negative    pH Urine 6.0 5.0 - 7.0 pH    Protein Albumin Urine 100 (A) NEG^Negative mg/dL    Urobilinogen mg/dL Normal 0.0 - 2.0 mg/dL    Nitrite Urine Negative NEG^Negative    Leukocyte Esterase Urine Large (A) NEG^Negative    Source Catheterized Urine      WBC Urine >182 (H) 0 - 5 /HPF    RBC Urine 30 (H) 0 - 2 /HPF    WBC Clumps Present (A) NEG^Negative /HPF    Bacteria Urine Moderate (A) NEG^Negative /HPF    Yeast Urine Many (A) NEG^Negative /HPF   Glucose by meter   Result Value Ref Range    Glucose 112 (H) 70 - 99 mg/dL   Basic metabolic panel   Result Value Ref Range    Sodium 140 133 - 144 mmol/L    Potassium 4.5 3.4 - 5.3 mmol/L    Chloride 113 (H) 94 - 109 mmol/L    Carbon Dioxide 20 20 - 32 mmol/L    Anion Gap 7 3 - 14 mmol/L    Glucose 88 70 - 99 mg/dL    Urea Nitrogen 60 (H) 7 - 30 mg/dL    Creatinine 2.07 (H) 0.52 - 1.04 mg/dL    GFR Estimate 23 (L) >60 mL/min/[1.73_m2]    GFR Estimate If Black 27 (L) >60 mL/min/[1.73_m2]    Calcium 8.3 (L) 8.5 - 10.1 mg/dL   CBC with platelets differential   Result Value Ref Range    WBC 9.1 4.0 - 11.0 10e9/L    RBC Count 3.39 (L) 3.8 - 5.2 10e12/L    Hemoglobin 9.7 (L) 11.7 - 15.7 g/dL    Hematocrit 31.6 (L) 35.0 - 47.0 %    MCV 93 78 - 100 fl    MCH 28.6 26.5 - 33.0 pg    MCHC 30.7 (L) 31.5 - 36.5 g/dL    RDW 15.0 10.0 - 15.0 %    Platelet Count 144 (L) 150 - 450 10e9/L    Diff Method Automated Method     % Neutrophils 77.4 %    % Lymphocytes 13.6 %    % Monocytes 6.3 %    % Eosinophils 2.1 %    % Basophils 0.3 %    % Immature Granulocytes 0.3 %    Nucleated RBCs 0 0 /100    Absolute Neutrophil 7.0 1.6 - 8.3 10e9/L    Absolute Lymphocytes 1.2 0.8 - 5.3 10e9/L    Absolute Monocytes 0.6 0.0 - 1.3 10e9/L    Absolute Eosinophils 0.2 0.0 - 0.7 10e9/L    Absolute Basophils 0.0 0.0 - 0.2 10e9/L    Abs Immature Granulocytes 0.0 0 - 0.4 10e9/L    Absolute Nucleated RBC 0.0    Hemoglobin A1c   Result Value Ref Range    Hemoglobin A1C 6.8 (H) 0 - 5.6 %   Glucose by meter   Result Value Ref Range    Glucose 113 (H) 70 - 99 mg/dL   Tacrolimus level   Result Value Ref Range    Tacrolimus Last Dose Not Provided     Tacrolimus Level 3.4 (L) 5.0 - 15.0 ug/L   Glucose by meter   Result Value Ref Range    Glucose 93 70 - 99 mg/dL    Glucose by meter   Result Value Ref Range    Glucose 88 70 - 99 mg/dL   Glucose by meter   Result Value Ref Range    Glucose 97 70 - 99 mg/dL   Glucose by meter   Result Value Ref Range    Glucose 131 (H) 70 - 99 mg/dL   Glucose by meter   Result Value Ref Range    Glucose 118 (H) 70 - 99 mg/dL   Glucose by meter   Result Value Ref Range    Glucose 80 70 - 99 mg/dL   Glucose by meter   Result Value Ref Range    Glucose 102 (H) 70 - 99 mg/dL   Glucose by meter   Result Value Ref Range    Glucose 111 (H) 70 - 99 mg/dL   Glucose by meter   Result Value Ref Range    Glucose 130 (H) 70 - 99 mg/dL   Basic metabolic panel   Result Value Ref Range    Sodium 139 133 - 144 mmol/L    Potassium 4.2 3.4 - 5.3 mmol/L    Chloride 110 (H) 94 - 109 mmol/L    Carbon Dioxide 20 20 - 32 mmol/L    Anion Gap 9 3 - 14 mmol/L    Glucose 93 70 - 99 mg/dL    Urea Nitrogen 48 (H) 7 - 30 mg/dL    Creatinine 1.85 (H) 0.52 - 1.04 mg/dL    GFR Estimate 27 (L) >60 mL/min/[1.73_m2]    GFR Estimate If Black 31 (L) >60 mL/min/[1.73_m2]    Calcium 8.5 8.5 - 10.1 mg/dL   Glucose by meter   Result Value Ref Range    Glucose 119 (H) 70 - 99 mg/dL   Glucose by meter   Result Value Ref Range    Glucose 92 70 - 99 mg/dL   Care Transition RN/SW IP Consult    Narrative    Heraclio Roman RN     8/9/2019 11:52 AM  Care Transition RN  Met with patient and spoke with patients spouse regarding   discharge plans. Patient known to our team due to frequent   readmissions.  Spouse stating patient at baseline is bed bound   and gets frequent UTIs wit sepsis. Spouse aware due to her   complicated medical history she needs medical attention. Patient   has home care through [facility] and would like to continue it.   Patient has all equipments that's necessary to assist with cares.  Patient discharging today with follow up with primary.   requested transportation. [facility]   transportation arranged as patient is confused occasionally and   bedbound.     ISTAT gases lactate galindo POCT   Result Value Ref Range    Ph Venous 7.28 (L) 7.32 - 7.43 pH    PCO2 Venous 47 40 - 50 mm Hg    PO2 Venous 51 (H) 25 - 47 mm Hg    Bicarbonate Venous 22 21 - 28 mmol/L    O2 Sat Venous 80 %    Lactic Acid 0.8 0.7 - 2.1 mmol/L   Urine Culture   Result Value Ref Range    Specimen Description Catheterized Urine     Special Requests Specimen received in preservative     Culture Micro (A)      >100,000 colonies/mL  Citrobacter freundii complex      Culture Micro (A)      10,000 to 50,000 colonies/mL  Candida albicans / dubliniensis  Candida albicans and Candida dubliniensis are not routinely speciated  Susceptibility testing not routinely done         Susceptibility    Citrobacter freundii complex - SHEELA     CEFAZOLIN* >=64 Resistant ug/mL      * Cefazolin SHEELA breakpoints are for the treatment of uncomplicated urinary tract infections.  For the treatment of systemic infections, please contact the laboratory for additional testing.Intrinsically Resistant     CEFOXITIN* >=64 Resistant ug/mL      * Intrinsically Resistant     CEFTAZIDIME <=1 Sensitive ug/mL     CEFTRIAXONE <=1 Sensitive ug/mL     CIPROFLOXACIN 0.5 Intermediate ug/mL     GENTAMICIN <=1 Sensitive ug/mL     LEVOFLOXACIN 1 Intermediate ug/mL     NITROFURANTOIN 32 Sensitive ug/mL     TOBRAMYCIN <=1 Sensitive ug/mL     Trimethoprim/Sulfa <=1/19 Sensitive ug/mL     Piperacillin/Tazo <=4 Sensitive ug/mL     CEFEPIME <=1 Sensitive ug/mL   Blood culture   Result Value Ref Range    Specimen Description Blood Right Hand     Special Requests Aerobic and anaerobic bottles received     Culture Micro No growth    Blood culture   Result Value Ref Range    Specimen Description Blood Left Hand     Special Requests Aerobic and anaerobic bottles received     Culture Micro No growth      *Note: Due to a large number of results and/or encounters for the requested time period, some results have not been displayed. A complete set of results can be  found in Results Review.        ASSESSMENT/PLAN:                                                    ASSESSMENT / PLAN:  (Z09) Hospital discharge follow-up  (primary encounter diagnosis)  Comment: improving except for increase in Vertigo   Plan: **Basic metabolic panel FUTURE anytime        Will have [facility] draw a lab and have PharmD call about her medication changes, ok to continue with Zofran     (Z96.0) Johnson catheter in place  Comment: freq UTI's, changes every 3 weeks   Plan: lidocaine (XYLOCAINE) 2 % external gel        With cath changes and can use on her pain areas     (E11.65,  Z79.4) Type 2 diabetes mellitus with hyperglycemia, with long-term current use of insulin (H)  Comment: baseline  Plan: discussed more regular eating, maintain lantus at 30IU and sliding scale     (I10) Benign essential hypertension  Comment: stable on increase dose of norvasc   Plan: monitor by [facility], wrist cuff not accurate     (Z94.0) Kidney replaced by transplant  Comment: labs at baseline   Plan: will contact Dr. Elizabeth about UTI prevention          CLASS 2 OBESITY: needs a eletric christiana in order to get up into chair with one person, DME placed      (Z74.01) Bedbound  Comment: chronic,   Plan: order for DME        Needs electric christiana to get up in her WC, DME placed     Patient Instructions   We have reordered your lidocaine    I have placed an order for your electric christiana     We will have our pharmD call you to see if any meds are contributing to your vertigo     Continue your meds as directed     We will try and get your blood drawn    I will contact Dr. Elizabeth about a UTI preventive        ANTHONY Antonio [facility]    Visit time 60   min with > than 50% of the time spent in counseling on: hospital follow up, DM, UTI pain

## 2019-09-04 NOTE — PROGRESS NOTES
Call placed to [facility] RN for order of BMP. She will draw it 9/5/19.  Marivel Klein RN

## 2019-09-06 NOTE — TELEPHONE ENCOUNTER
Requested Prescriptions   Pending Prescriptions Disp Refills     gabapentin (NEURONTIN) 100 MG capsule [Pharmacy Med Name: GABAPENTIN 100MG CAPS] 30 capsule 0     Sig: TAKE ONE CAPSULE BY MOUTH NIGHTLY AS NEEDED FOR SEVERE PAIN       There is no refill protocol information for this order          Problem List Complete:  Yes    Last Written Prescription Date:  8/9/19  Last Fill Quantity: 30,   # refills: 0      Last Office Visit with [facility] primary care provider: 8/28/19    Future Office visit:   Next 5 appointments (look out 90 days)    Sep 24, 2019  1:30 PM CDT  Return Visit with ANTHONY Antonio CNP  [facility] [address]   [address]  [facility] [id number] [phone]          Controlled substance agreement:   Encounter-Level CSA:    There are no encounter-level csa.     Patient-Level CSA:    There are no patient-level csa.          Processing:  eprescribe    [email or web address]   checked in past 3 months?  No, route to RN  [place]  reviewed 9/6/2019     Last refill: 8/9/19  Disp: 30    Concerns: YES - patient last filled 100 mg with outside provider    Patient filled 300 mg  Filled 7/29  Disp: 70

## 2019-09-06 NOTE — TELEPHONE ENCOUNTER
Controlled Substance Refill Request for gabapentin (NEURONTIN) 100 MG capsule  Problem List Complete:  Yes   checked in past 3 months?  No, route to RN

## 2019-09-12 NOTE — TELEPHONE ENCOUNTER
ISSUE:  Creatinine 2.2, up from pt baseline 1.6-1.8  Tac 6.2 at goa 4-6.   Frequent UTIs and recently hospitalized 8/2019 for UTI.  Abx course now completed.    OUTCOME:   Call placed to pt.  Pt aware creatinine up from baseline.  Pt reports diarrhea, 1-2 emesis and nausea over weekend.  Pt reports she was able to stay hydrated and keep IS down. Pt reports she is feeling better now.  Pt reports good hydration and denies medication changes, fever, missed IS doses, or s/s UTI.  Pt educated to cont good hydration and aware HC RN will draw her transplant labs again next week.     Call placed to [facility].  Verbal order for repeat transplant labs given.  She with f/u with pt PCP to get order to draw pt urine for her monthly UA/UC to rule out UTI.  RN will draw pt labs Tuesday next week.     Pt questions answered, pt v/u.

## 2019-09-16 NOTE — TELEPHONE ENCOUNTER
Home Health Certification and Plan of Care faxed on 09/10/2019 to  [facility] to [phone].  Cecilia Hernandez MA

## 2019-09-18 NOTE — RESULT ENCOUNTER NOTE
Tac at goal, no dose change at this time.  Creatinine trending down, but still above baseline at 2.16.  Appears pt has UTI.  Marivel Barcenas APRN CNP managing UTI treatment.  Will cont to monitor creatine once abx treatment complete.

## 2019-09-19 NOTE — TELEPHONE ENCOUNTER
ISSUE:  Creatinine 2.16 down from 2.2.  Baseline 1.5-1.8     RNCC spoke with Marivel Barcenas, APRN CNP regarding pt.  Marivel reports she has been trying to reach ID as recommended by Dr Elizabeth d/t recurrent UTIs.  She reports this UA didn't show an infection but mixed urogenital santos.  Marivel is going to start pt on gentamycin rico flushes weekly.      OUTCOME:   Call placed to pt.  RNCC spoke with .  Chang reports he heard about the flushes.  He reports pt has been hydrating well, but reports pt hasn't been feeling well and thinks she may have a cold or flu.  He denies pt having a fever, N/V.  He does report pt having worsening neuropathy.  Chang encouraged to f/u with pt PCP regarding flu like s/s.      Call placed to pt HCRN regarding lab draw the first week of October.

## 2019-09-24 NOTE — PROGRESS NOTES
"Gem Jade is a 73 year old female who is being evaluated via a billable telephone visit.      The patient has been notified of following:     \"This telephone visit will be conducted via a call between you and your physician/provider. We have found that certain health care needs can be provided without the need for a physical exam.  This service lets us provide the care you need with a short phone conversation.  If a prescription is necessary we can send it directly to your pharmacy.  If lab work is needed we can place an order for that and you can then stop by our lab to have the test done at a later time.    If during the course of the call the physician/provider feels a telephone visit is not appropriate, you will not be charged for this service.\"     Consent has been obtained for this service by 1 care team member: yes. See the scanned image in the medical record.    Gem Jade complains of    Chief Complaint   Patient presents with     RECHECK       I have reviewed and updated the patient's Past Medical History, Social History, Family History and Medication List.    ALLERGIES  Atorvastatin calcium; Codeine; Darvocet [propoxyphene n-apap]; Hydromorphone; Levofloxacin; Morphine; Niaspan [niacin]; Percocet [oxycodone-acetaminophen]; and Vicodin [hydrocodone-acetaminophen]      Additional provider notes:    [facility] does not do lifts, they called them to let them know, see DME order      Insomnia  Duration of complaint: not able to sleep at night due to nightmares, worried about people breaking into the house, has not been taking her prazocin at bedtime      Mental Health Symptoms      Duration: worse with hospitalizations     Description:  Depression: no  Anxiety: YES  Sleep problems: YES- due nightmares   Concentration problems: YES- with infections     Therapies tried and outcome: prazocin with good relief     See PHQ-9 and LISSY scores, as appropriate     UTI - Female  Duration of complaint: had some " mental confusion with cath change and then slept for a long period but back to baseline now, UA looked suggestive of UTI but culture was negative, will start gentamicin flushes on Friday, weekly     Diabetes Follow-up      How often are you checking your blood sugar? Three times daily    What time of day are you checking your blood sugars (select all that apply)?  Before meals and After meals, currently taking 15u of lantus daily and 10u of novolog with meals 2x daily with sliding scale, if doesn't eat then just uses sliding scale     Have you had any blood sugars above 200?  Yes 300's    Have you had any blood sugars below 70?  Yes 70-80's not eating initially but now eating at baseline      What symptoms do you notice when your blood sugar is low?  Lethargy and Confusion    What concerns do you have today about your diabetes? Increase in neuropathy      Do you have any of these symptoms? (Select all that apply)  Numbness in feet and Burning in feet     Have you had a diabetic eye exam in the last 12 months? No     VERTIGO: improved, unsure why     BP Readings from Last 2 Encounters:   08/28/19 120/60   08/09/19 135/63     Hemoglobin A1C (%)   Date Value   08/07/2019 6.8 (H)   04/24/2019 6.2 (H)     LDL Cholesterol Calculated (mg/dL)   Date Value   12/05/2016 64   02/05/2015 18       Diabetes Management Resources    Assessment/Plan:  (E11.65,  Z79.4) Type 2 diabetes mellitus with hyperglycemia, with long-term current use of insulin (H)  (primary encounter diagnosis)  Comment: worse hyperglycemic, had reduced lantus to 15 due to not eating, taking 10u of novolog 2x daily and sliding scale 1x daily, was on 30u and hospital reduced to 20u   Plan: increase Lantus to 20u and continue Novolog at 10u2x daily and a 3rd time sliding scale only if not eating, will have Pharmd follow up     (F51.5) Nightmares  Comment: confused since home for the hospital with her minipress so not taking and not taking right dose, nightmares  worse    Plan: prazosin (MINIPRESS) 1 MG capsule        Restart at 2mg nightly     (Z94.0) Kidney replaced by transplant  Comment: elevated creatinine, followed by the transplant team, no infection   Plan: follow with transplant team     (Z96.0) Chronic indwelling Johnson catheter  Comment: changing every 3 weeks, flushing 3x weekly, frequently plugged with mucous, high risk for UTI's    Plan: will start weekly gentamicin flushes as ordered     (N39.0) Recurrent UTI  Comment: monthly infections since 2/2019   Plan: will start gentamicin flushes     Patient Instructions   Please start your Prazocin again for your nightmares    Please take your as needed gabapentin nightly until pain is back to baseline     We will have our PharmD call you about your insulin doses, please take 20u of lantus and 10u 2x daily with meals of Novolog    I will have our nurse follow up on the lift we are looking for since [facility] does not carry it        I have reviewed the note as documented above.  This accurately captures the substance of my conversation with the patient.  Klaudia MCFARLANEP      Total time of call between patient and provider was 30 minutes     Marivel Barcenas, NP, APRN CNP

## 2019-09-24 NOTE — TELEPHONE ENCOUNTER
Home Health Certification and Plan of care faxed to [facility] at [phone].  Cecilia Hernandez MA

## 2019-09-24 NOTE — PATIENT INSTRUCTIONS
Please start your Prazocin again for your nightmares    Please take your as needed gabapentin nightly until pain is back to baseline     We will have our PharmD call you about your insulin doses, please take 20u of lantus and 10u 2x daily with meals of Novolog    I will have our nurse follow up on the lift we are looking for since [facility] does not carry it

## 2019-09-24 NOTE — Clinical Note
Conchita can you please call and follow upon their insulin dosing Marivel M[facility] does not carry lifts can you see where we can send the DME request listed in the medication list

## 2019-09-25 NOTE — TELEPHONE ENCOUNTER
Spoke with [facility] pharmacy, they will call pt & go over what to do with the irrigation solution.  Marivel Klein RN

## 2019-09-26 NOTE — PROGRESS NOTES
Clinic Care Coordination Contact  Care Team Conversations    Currently off site but would like to review yesterday's telephone encounter with triage nurse before outreaching to patient. Will follow up tomorrow with nurse    Porsha Khalil RN  [facility] [facility]-Care Coordination  [facility]  [facility]  [phone]

## 2019-10-01 NOTE — TELEPHONE ENCOUNTER
Faxed signed order to [phone]    Documents sent to abstract for scanning.    Theodore Galindo MA

## 2019-10-01 NOTE — PROGRESS NOTES
Clinic Care Coordination Contact    Follow Up Progress Note      Assessment: pro-active outreach call placed to patient. Patient doing well. Working on [place] [employer].  bought her a new christiana lift that only needs one person to operate. Looking forward to being able to get out of bed and into her wheelchair as previously she has been basically bed bound for 8 years. Patient states her goal would be to be pushed outside in her wheelchair and to get into her handicap accessible van and go to dinner with her . We had a discussion about advance care planning    Goals addressed this encounter:   Goals Addressed                 This Visit's Progress      Other (pt-stated)        2-Goal Statement: I want to be able to get my wheelchair into our van so my  and I can go out to dinner  Measure of Success: gets wheelchair into van and goes to dinner  Supportive Steps to Achieve:  bought new christiana lift  Barriers: bedbound  Strengths: wants to get out of bed   Date to Achieve By: 1-1-20  Patient expressed understanding of goal: yes                 Intervention/Education provided during outreach: none          Plan:   Patient doing well with no current medical concerns. Will mail out advance care planning documents to home.   Care Coordinator will follow up in approximately one month.     Porsha Khalil RN  [facility] [facility]-Care Coordination  [facility] [place] [facility]  [facility]  [phone]
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
## 2019-10-08 NOTE — PROGRESS NOTES
"SUBJECTIVE/OBJECTIVE:                Gem Jade is a 73 year old female called for a follow-up visit for Medication Therapy Management.  She was referred to me from Marivel Barcenas.     Chief Complaint: Follow up from our visit on 6/27/19.    Personal Healthcare Goals: Use her new Ulisses lift to transfer to wheelchair with only one person and be able to go outside.     Medication Adherence/Access:  Patient has assistance with medication administration and setting up boxes (: Keshawn)     Diabetes:  Pt currently taking Lantus 20 units at bedtime for the last two weeks and Novolog sliding scale. At dinner she is using 10 units base and adding sliding scale. Pt is not experiencing side effects.    Sliding scale:   140-199 2u  200-249 4u  250-299 6u  300-349 8u  350-399 10u  Over 400 12u   SMBG: 3-4 times a day. Ranges (pt reported):   Date FBG/ 2hours post Lunch/2hours post Dinner /2hours post    10/8 92      10/7 139 135 59/93    10/6 214 166 /204     131 62 /218     168 90 /222      92 /79              Recent symptoms of low blood sugar? Yes, 1-2 times per week when she doesn't eat as much as she anticipates. She is getting symptoms of hypoglycemia.   Recent symptoms of high blood sugar? No  Eye exam: due  Foot exam: due  ACEi/ARB: No due to history of elevated Cr   Aspirin: Taking 81mg daily and denies side effects  Urine Albumin:   Lab Results   Component Value Date    MICROL 1,850 04/26/2017   Diet: has been eating smaller portions for her dinner. Breakfast is cottage cheese and fruit--will only use Novolog sliding scale.     Lab Results   Component Value Date    A1C 6.8 08/07/2019    A1C 6.2 04/24/2019    A1C 7.3 02/28/2019    A1C 8.2 09/16/2018    A1C 8.8 05/26/2018     Diabetic Neuropathy: Currently taking Gabapentin 300mg 2 caps BID.  Trying to \"tough it out\" because doesn't want to take extra medication. Working well most of the time but some days has terrible neuropathy in her feet at " night and hard to sleep. Has taken an extra 100mg gabapentin on a rare occasion.   -Pt was previously on amitriptyline 50mg daily and oxcarbazepine 600mg bid but discontinued due to multiple hospitalizations for encephalopathy.    Nightmares: restarted taking prazosin 2mg HS which has been helpful; nightmares have again resolved. Also restarted using her home security system which has relieved much of her anxiety and stress about someone breaking into her home.     Today's Vitals: There were no vitals taken for this visit. - phone call      ASSESSMENT:              Current medications were reviewed today as discussed above.      Medication Adherence: good, no issues identified    Diabetes: improved. A1c at goal <8% but having more frequent hypoglycemia. Will have pt use small/large meal doses to prevent hypoglycemia after meals.     Diabetic neuropathy: needs improvement. Continuing to balance risk/benefit of medications for neuropathy and history of CNS side effects. Will continue gabapentin at current doses due to renal impairment.     Nightmares: resolved with restarting prazosin.       PLAN:                  Decrease Novolog to 5 units for a small dinner, 10 units for a larger dinner. Continue to use sliding scale.     I spent 45 minutes with this patient today. All changes were made via collaborative practice agreement with Marivel Barcenas. A copy of the visit note was provided to the patient's primary care provider.     Will follow up in 1 month.    The patient was sent via [employer] a summary of these recommendations as an after visit summary.    Conchita Toledo, PharmD, BCACP

## 2019-10-09 NOTE — TELEPHONE ENCOUNTER
ISSUE:   Cr 2.43    PLAN:  Call placed to patient. She reports she is feeling well. Has been doing gentamicin flushes in catheter x 2 weeks now.   Instructed patient to f/u with PCP if any issues/concerns come up or she feels ill. Pt v/u.  Per Dr Elizabeth, no changes at this time.

## 2019-10-10 NOTE — TELEPHONE ENCOUNTER
Routing refill request to provider for review/approval because:  Labs out of range:    Creatinine   Date Value Ref Range Status   10/08/2019 2.43 (H) 0.52 - 1.04 mg/dL Final     Thank You!  Della Osborne, RN  Triage Nurse

## 2019-10-13 NOTE — PATIENT INSTRUCTIONS
Recommendations from today's MTM visit:                                                      Glad you are doing well and I'm excited to hear about how it goes with the new lift!    We talked about trying Novolog 5 units with a small dinner and 10 units for larger dinner. OK to continue the sliding scale.     It was great to speak with you today.  I value your experience and would be very thankful for your time with providing feedback on our clinic survey. You may receive a survey via email or text message in the next few days.     Next MTM visit: I'll call in another month    To schedule another MTM appointment, please call the clinic directly or you may call the MTM scheduling line at [phone] or toll-free at [phone].     My Clinical Pharmacist's contact information:                                                      It was a pleasure talking with you today!  Please feel free to contact me with any questions or concerns you have.      Conchita Toledo, PharmD, [facility]   [phone]

## 2019-10-21 NOTE — TELEPHONE ENCOUNTER
Gabapentin 300 mg      Last Written Prescription Date:  7/29/19  Last Fill Quantity: 70,   # refills: 0  Last Office Visit: 9/24/19  Future Office visit:       Routing refill request to provider for review/approval because:  Drug not on the [facility], P or  Health refill protocol or controlled substance      [place] script in [place]  was written by PCP and was filled on 9/6/19 . Routed to PCP for review. Sandi Long RN October 21, 2019 4:20 PM

## 2019-10-31 NOTE — PHARMACY-ADMISSION MEDICATION HISTORY
Admission medication history interview status for the 10/31/2019  admission is complete. See EPIC admission navigator for prior to admission medications     Medication history source reliability:Good    Actions taken by pharmacist (provider contacted, etc):called      Additional medication history information not noted on PTA med list :None    Medication reconciliation/reorder completed by provider prior to medication history? Yes    Time spent in this activity: 20 min    Prior to Admission medications    Medication Sig Last Dose Taking? Auth Provider   acetaminophen (TYLENOL) 325 MG tablet Take 325-650 mg by mouth every 4 hours as needed   Yes Unknown, Entered By History   amLODIPine (NORVASC) 5 MG tablet TAKE TWO TABLETS (10MG) BY MOUTH DAILY 10/30/2019 at Unknown time Yes Jayashree Denton APRN CNP   aspirin 81 MG tablet Take 81 mg by mouth daily 10/30/2019 at Unknown time Yes Unknown, Entered By History   baclofen (LIORESAL) 10 MG tablet Take 1 tablet (10 mg) by mouth 2 times daily as needed for other (Bladder Spasms) prn at Unknown time Yes Marivel Barcenas APRN CNP   CRANBERRY PO Take 1 tablet by mouth 2 times daily  10/30/2019 at Unknown time Yes Unknown, Entered By History   diphenoxylate-atropine (LOMOTIL) 2.5-0.025 MG tablet Take 1-2 tablets by mouth 4 times daily as needed for diarrhea prn Yes Pat Tinajero APRN CNP   famotidine (PEPCID) 20 MG tablet Take 1 tablet (20 mg) by mouth 2 times daily 10/30/2019 at Unknown time Yes Marivel Barcenas APRN CNP   gabapentin (NEURONTIN) 100 MG capsule TAKE ONE CAPSULE BY MOUTH NIGHTLY AS NEEDED FOR SEVERE PAIN prn Yes Marivel Barcenas APRN CNP   gabapentin (NEURONTIN) 300 MG capsule Take 1 capsule (300 mg) by mouth 2 times daily 10/30/2019 at Unknown time Yes Socrates Baptiste MD   gentamicin irrigation Irrigate rico cath weekly 60ml of 400mg of gentamicinin [id number] of 0.9% Sodium Chloride prn Yes  Marivel Barcenas APRN CNP   insulin aspart (NOVOLOG FLEXPEN) 100 UNIT/ML pen Take 10 units with meals + sliding scale if eating. Sliding scale only at bedtime when not eating. Max 40 units per day. Hold if pre-meal glucose is <90  Sliding scale:   140-199 2u  200-249 4u  250-299 6u  300-349 8u  350-399 10u  Over 400 12u  Yes    insulin glargine (LANTUS SOLOSTAR) 100 UNIT/ML pen Inject 20 Units Subcutaneous At Bedtime 10/30/2019 at Unknown time Yes Marivel Barcenas APRN CNP   labetalol (NORMODYNE) 300 MG tablet Take 0.5 tablets (150 mg) by mouth 2 times daily 10/30/2019 at Unknown time Yes Marivel Barcenas APRN CNP   Lactobacillus Rhamnosus, GG, (CULTURELL) capsule Take 1 capsule by mouth 2 times daily 10/30/2019 at Unknown time Yes She iSddiqui MD   levothyroxine (SYNTHROID/LEVOTHROID) 50 MCG tablet TAKE ONE TABLET BY MOUTH EVERY DAY 10/30/2019 at Unknown time Yes Marivel Barcenas APRN CNP   lidocaine (XYLOCAINE) 2 % external gel Apply topically 3 times daily  Patient taking differently: Apply topically 3 times daily as needed  prn Yes Marivel Barcenas APRN CNP   loratadine (CLARITIN) 10 MG tablet Take 1 tablet (10 mg) by mouth daily 10/30/2019 at Unknown time Yes Pat Tinajero APRN CNP   menthol-zinc oxide (CALMOSEPTINE) 0.44-20.625 % OINT ointment Apply topically 4 times daily as needed for skin protection prn Yes Marivel Barcenas APRN CNP   ondansetron (ZOFRAN ODT) 4 MG ODT tab Take 1 tablet (4 mg) by mouth daily as needed for nausea 30 minutes before getting up in your wheelchair prn Yes Marivel Barcenas APRN CNP   pravastatin (PRAVACHOL) 10 MG tablet Take 1 tablet (10 mg) by mouth daily 10/30/2019 at Unknown time Yes Pat Tinajero APRN CNP   prazosin (MINIPRESS) 1 MG capsule Take 2 capsules (2 mg) by mouth At Bedtime 10/29/2019 at Unknown time Yes Marivel Barcenas APRN CNP   predniSONE (DELTASONE) 5 MG tablet  "Take 1 tablet (5 mg) by mouth daily 10/30/2019 at Unknown time Yes Marivel Barcenas APRN CNP   simethicone (MYLICON) 125 MG CHEW Take 1 tablet (125 mg) by mouth as needed for intestinal gas prn Yes    tacrolimus (GENERIC EQUIVALENT) 1 MG capsule Take 2 capsules (2 mg) by mouth 2 times daily 10/30/2019 at Unknown time Yes Marcus Ley MD   traMADol (ULTRAM) 50 MG tablet Take 1-2 tablets every 6 hours for post operative pain. prn Yes Armen Jefferson DPM   vitamin D3 (CHOLECALCIFEROL) 2000 units (50 mcg) tablet Take 2,000 Units by mouth 2 times daily  10/30/2019 at Unknown time Yes Unknown, Entered By History   blood glucose (NO BRAND SPECIFIED) lancets standard Use to test blood sugar 3 times daily or as directed. (Accuchek guide)   Mraivel Barcenas APRN CNP   blood glucose (NO BRAND SPECIFIED) test strip Use to test blood sugar 3 times daily or as directed. (accuchek guide)   Marivel Barcenas APRN CNP   COMFORT EZ PEN NEEDLES 31G X 6 MM miscellaneous USE 4 DAILY OR AS DIRECTED   Marivel Barcenas APRN CNP   cyanocobalamin (CYANOCOBALAMIN) 1000 MCG/ML injection Inject 1 mL (1,000 mcg) into the muscle every 2 months More than a month at Unknown time  Marivel Barcenas APRN CNP   order for DME Equipment being ordered: Traxx Mobility System, Titan 500, free standing overhead patient lift    Please send to  Marivel Mcpherson APRN CNP   order for DME Equipment being ordered: CPAP mask, ResMed Ahuja FX, nasal pillows system, women's,   Catalog # [id number]    Please send to  Marivel Mcpherson APRN CNP   order for DME Equipment being ordered: Tegaderm 4x4   MICHELLE 6 months  Sacral wound   Marivel Barcenas APRN CNP   order for DME Equipment being ordered: Top Rops Iman gel-T cushion 20 inches wide & 18 inches deep    height 1.676 m (5' 6\"), weight 101.6 kg (223 lb 15.8 oz)   Marivel Barcenas, ANTHONY CNP         "

## 2019-10-31 NOTE — ED PROVIDER NOTES
Emergency Department Attending Supervision Note  10/31/2019  1:45 PM    I evaluated this patient in conjunction with MURALI Gupta.    Briefly, the patient presented with possible UTI.  The patient has had decreased mental status which is baseline when she gets a UTI.  There is been no falls or other concern.    On my exam:  Physical Exam   General:  Sitting on bed with  at bedside, comfortable appearing.   HENT:  No obvious trauma to head  Right Ear:  External ear normal.   Left Ear:  External ear normal.   Nose:  Nose normal.   Eyes:  Conjunctivae and EOM are normal.  Neck: Normal range of motion. Neck supple. No tracheal deviation present.   Pulm/Chest: No respiratory distress  M/S: Normal range of motion.   Neuro: Alert. GCS 15.  Skin: Skin is warm and dry. No rash noted. Not diaphoretic.   Psych: Normal mood and affect. Behavior is normal.     Brief MDM:  Gem Jade is a very pleasant 73 year old year old female who presents to the emergency department with concern of change of mental status which is consistent with her prior UTIs.  Urinalysis confirms UTI.  Urine culture pending.  Prior urine cultures demonstrate that Rocephin to provide good coverage.  This was given.  2 blood cultures were obtained prior to this.  A lactate is negative.  Her vital signs are not consistent with sepsis or septic shock.  Given her significant debility, she will be admitted to the hospital for continued evaluation and treatment of this UTI given her fragile history with his kidney transplant.    My Impression and diagnosis:    ICD-10-CM    1. Urinary tract infection N39.0    2. Altered mental status R41.82          Abisai Haas DO Anderson, Robert James, DO  10/31/19 1551

## 2019-10-31 NOTE — PROGRESS NOTES
RECEIVING UNIT ED HANDOFF REVIEW    ED Nurse Handoff Report was reviewed by: Mindy Oates RN on October 31, 2019 at 4:29 PM

## 2019-10-31 NOTE — ED NOTES
Bed: ED11  Expected date:   Expected time:   Means of arrival:   Comments:  carla - 511 - 73 F uti eta 0916

## 2019-10-31 NOTE — ED TRIAGE NOTES
Presents via EMS. Diabetic and kidney transplant hx (1999). New catheter placed yesterday.  called 911 for decreased LOC, says that it is common for UTIs

## 2019-10-31 NOTE — ED PROVIDER NOTES
History     Chief Complaint:  Altered mental status    The history is provided by the EMS personnel. The history is limited by the condition of the patient.      Gem Jade is a 73 year old female who presents via EMS for altered mental status. The patient is diabetic and has a history of kidney transplant in 1999. She lives at home with her  where she uses a hospital bed and a lift. The patient had her rico catheter changed yesterday. Today her  called 911 for decreased level of consciousness and he states that this is common when she has a UTI.     Allergies:  Atorvastatin calcium   Codeine  Darvocet  Hydromorphone  Levofloxacin  Morphine  Niaspan  Percocet  Vicodin     Medications:    Norvasc  Aspirin  Lioresal  Cyanocobalamin  Lomotil  Pepcid  Gabapentin  Novolog  Lantus  Normodyne  Levothyroxine  Claritin  Zofran  Pravachol  Minipress  Mylicon  Tacrolimus  Ultram    Past Medical History:    Background diabetic retinopathy   Diabetic gastroparesis   Diarrhea   Dizziness and giddiness   Hyponatremia   Kidney replaced by transplant   Mixed hyperlipidemia   Osteopenia   Other and unspecified hyperlipidemia   Other pulmonary embolism and infarction   Polyneuropathy in diabetes  Primary localized osteoarthrosis, lower leg   Pyelonephritis  Type 2 diabetes mellitus with complications   Urinary tract infection, site not specified   UTI (urinary tract infection) due to urinary indwelling catheter   Kidney replaced by transplant  Diabetic gastroparesis   Back pain  Compression fx, thoracic spine   Anemia  Hydronephrosis  Benign essential hypertension  Clostridium difficile colitis  Chronic shoulder pain, unspecified laterality  Morbid obesity, unspecified obesity type   Paroxysmal A-fib   Acute kidney injury   Pancytopenia, acquired   Obstructive sleep apnea syndrome  Seizure disorder   Dermatitis  Encephalopathy acute  Sepsis   Overdose, accidental or unintentional  CKD (chronic kidney disease)  "stage 3  Paranoia   Altered mental status    Past Surgical History:    Amputate toes right  Kidney transplant  Total knee arthroplasty, right  Cholecystectomy  Left heart catheterization  Bilateral cataract surgery    Family History:    Diabetes  Parkinsonism  Hypertension  Heart disease    Social History:  Patient is   Tobacco Use: Former smoker  Alcohol Use: No  PCP: Marivel Barcenas NP     Review of Systems   Unable to perform ROS: Acuity of condition     Physical Exam   First Vitals:  Patient Vitals for the past 24 hrs:   BP Temp Temp src Pulse Heart Rate Resp SpO2 Height Weight   10/31/19 1501 -- -- -- -- -- -- 92 % -- --   10/31/19 1434 (!) 150/55 -- -- 64 -- -- 98 % -- --   10/31/19 1403 138/62 -- -- 65 -- -- -- -- --   10/31/19 1341 -- -- -- -- -- -- 98 % -- --   10/31/19 1306 (!) 143/55 98.2  F (36.8  C) Temporal -- 66 16 98 % 1.753 m (5' 9\") 103.9 kg (229 lb)     Physical Exam  Constitutional: Obtunded  Eyes: Pupils equally round and reactive  HENT: Head is normal in appearance. Oropharynx is normal with moist mucus membranes.  Cardiovascular: Regular rate and rhythm and without murmurs.  Respiratory: Normal respiratory effort, lungs are clear bilaterally.  GI: Abdomen is soft, non-distended. No guarding, rebound, or rigidity.  Musculoskeletal: No asymmetry of the lower extremities, no tenderness to palpation.   Skin: Normal, without rash.  Neurologic: Patient obtunded  Nursing notes and vital signs reviewed.    Emergency Department Course     Laboratory:  ISTAT Gases Lactate Vein POCT (1405): pH: 7.24 low, PCO2: 46, PO2: 35, Bicarbonate: 20 low, O2 Sat: 57, Lactic Acid: 0.8  CBC:  WBC 8.1, HGB 10.7 low,   BMP: Chloride 112 high, Glucose 152 high, BUN 56 high, Creatinine 2.69 high, GFR 17 low, o/w WNL.   Blood Culture x2: Pending  UA: GLC 70 (A), Blood moderate (A), Protein Albumin 300 (A), Nitrite positive (A), Leukocyte Esterase large (A), WBC >182 high, RBC 11 high, Bacteria few " (A), Mucous present (A), o/w WNL  Urine Culture: Pending    Interventions:  NS IV  Rocephin 1g IV    Emergency Department Course:  1:36 PM Nursing notes and vitals reviewed.  I performed an exam of the patient as documented above.     I ordered the above labs.  I ordered the above interventions.    Patient admitted.    Impression & Plan      Medical Decision Making:  Gem Jade is a 73 year old female with a complex medical history and chronic UTIs with indwelling urinary catheter who presents today for evaluation of altered mental status.  All history is obtained by EMS.  Per EMS,  was on his way, however has not presented to the ED at the time of this dictation.  Patient had a Johnson catheter changed yesterday and developed decreased level of consciousness today prompting her  to call EMS.  Vitals and physical exam as above.  Work-up as above.  Patient has urinary tract infection.  Urine culture pending.  Patient has no leukocytosis, however 2 blood cultures are pending as well.  Patient has no lactic acidosis. Suspected altered level of consciousness is secondary to UTI. Will initiate patient on rocephin and fluids. Discussed the case with the hospitalist, who graciously accepted care of patient. Patient admitted in stable condition.    Diagnosis:    ICD-10-CM    1. Urinary tract infection N39.0    2. Altered mental status R41.82        Disposition:  Admitted to medical bed    I, Bradley Aasen, am serving as a scribe on 10/31/2019 at 1:35 PM to personally document services performed by Zan Nunn PA-C based on my observations and the provider's statements to me.          Zan Nunn PA-C  10/31/19 1540       Zan Nunn PA-C  10/31/19 1555       Zan Nunn PA-C  10/31/19 8349

## 2019-10-31 NOTE — ED NOTES
"[facility]  ED Nurse Handoff Report    ED Chief complaint: UTI      ED Diagnosis:   Final diagnoses:   Urinary tract infection   Altered mental status       Code Status: Full Code, admitting MD to confirm    Allergies:   Allergies   Allergen Reactions     Atorvastatin Calcium      myalgias, muscle aches     Codeine Nausea and Vomiting     Darvocet [Propoxyphene N-Apap] Nausea and Vomiting     Hydromorphone      Levofloxacin Other (See Comments) and Diarrhea     hallucinations     Morphine      Nausea and vomiting     Niaspan [Niacin] GI Disturbance     Flushing even at low dose, GI upset     Percocet [Oxycodone-Acetaminophen]      Vomiting after taking med couple of times     Vicodin [Hydrocodone-Acetaminophen] Nausea and Vomiting       Activity level - Baseline/Home:  Total Care  Activity Level - Current:   Total Care    Patient's Preferred language: English   Needed?: No    Isolation: Yes  Infection: VRE, MRSA, ESBL  Bariatric?: Yes    Vital Signs:   Vitals:    10/31/19 1341 10/31/19 1403 10/31/19 1434 10/31/19 1501   BP:  138/62 (!) 150/55    Pulse:  65 64    Resp:       Temp:       TempSrc:       SpO2: 98%  98% 92%   Weight:       Height:           Cardiac Rhythm: ,        Pain level:      Is this patient confused?: Decreased LOC  Does this patient have a guardian?  No         If yes, is there guardianship documents in the Epic \"Code/ACP\" activity?  N/A         Guardian Notified?  N/A  [place] - Suicide Severity Rating Scale Completed?  No, secondary to decreased LOC  If yes, what color did the patient score?  N/A    Patient Report: Initial Complaint: Presents with decreased LOC, similar symptoms in the past for UTIs  Focused Assessment: Patient responds to continuous stimuli, no spontaneous verbal stimulation. New catheter placed yesterday at home. Lungs clear, mildly hypertensive.   Tests Performed:   Labs Ordered and Resulted from Time of ED Arrival Up to the Time of Departure " from the ED   ROUTINE UA WITH MICROSCOPIC - Abnormal; Notable for the following components:       Result Value    Glucose Urine 70 (*)     Blood Urine Moderate (*)     Protein Albumin Urine 300 (*)     Nitrite Urine Positive (*)     Leukocyte Esterase Urine Large (*)     WBC Urine >182 (*)     RBC Urine 11 (*)     Bacteria Urine Few (*)     Mucous Urine Present (*)     All other components within normal limits   CBC WITH PLATELETS DIFFERENTIAL - Abnormal; Notable for the following components:    RBC Count 3.69 (*)     Hemoglobin 10.7 (*)     Hematocrit 33.8 (*)     All other components within normal limits   BASIC METABOLIC PANEL - Abnormal; Notable for the following components:    Chloride 112 (*)     Glucose 152 (*)     Urea Nitrogen 56 (*)     Creatinine 2.69 (*)     GFR Estimate 17 (*)     GFR Estimate If Black 20 (*)     All other components within normal limits   ISTAT  GASES LACTATE DONNA POCT - Abnormal; Notable for the following components:    Ph Venous 7.24 (*)     Bicarbonate Venous 20 (*)     All other components within normal limits   BLOOD CULTURE   BLOOD CULTURE   URINE CULTURE AEROBIC BACTERIAL     No orders to display     No orders to display     Treatments provided: comfort measures    Family Comments:  to come, not at bedside    OBS brochure/video discussed/provided to patient/family: N/A               ED Medications:   Medications   sodium chloride 0.9% infusion ( Intravenous ED Infusing on Admission/transfer 10/31/19 4543)   cefTRIAXone (ROCEPHIN) 1 g vial to attach to  mL bag for ADULTS or NS 50 mL bag for PEDS (0 g Intravenous ED Infusing on Admission/transfer 10/31/19 1553)       Drips infusing?:  No    For the majority of the shift this patient was Green.     Severe Sepsis OR Septic Shock Diagnosis Present: No    To be done/followed up on inpatient unit:  Antibiotics, monitor patient    ED NURSE PHONE NUMBER: [phone]

## 2019-10-31 NOTE — H&P
[facility]    History and Physical - Hospitalist Service       Date of Admission:  10/31/2019    Assessment & Plan   Gem Jade is a 73 year old female with multiple medical problems including recurrent cathter associated UTI (last admitted in 8/2019), CKD with hx of renal transplant, type II DM, HTN, hyperlipidemia, bedbound who was is being admitted due to altered mental status and UTI    Acute encephalopathy  Suspect this is likely due to urinary tract infection, possible dehydration and possible meds. She has hx of encephalopathy in the past related to UTIs. She is quite lethargic, does open her eyes to her name, but does not verbally respond back or follow commands appropriately.  reports this as typical symptoms with her prior UTIs.  LFTs are normal range. Blood glucose is 156  - treat UTI as below  - head CT ordered  - keep her NPO except meds until she is more alert, essential meds may need to be changed to PO if not alert enough  - hold PTA Neurontin for now  - neuro checks q4 hrs  - if another episode of diarrhea, will obtain C.diff given that she has been getting antibiotics for recurrent UTI    Catheter associated UTI, recurrent  UA is grossly abnormal with +nitrites, LE, >180 WBC. Lactic acid is normal. At this time, she is not febrile and no leukocytosis.  - catheter last changed at home on 10/30  - continue with ceftriaxone pending culture results  - f/u urine and blood culture results  - monitor for fever curve    Acute kidney injury  Hx of renal transplant and CKD-III-IV, most recent cr range 2.2-2.4  - will hydrate with normal saline @100cc/hr overnight  - monitor urine output and f/u cr in the morning  - if no improvement with hydration, consider transplant kidney US and nephrology consultation  - continue with PTA Prednisone and Tacrolimus    Type II DM with diabetic retinopathy, neuropathy, gastroparesis  - hold PTA Lantus/Novolog  - sliding scale insulin q4hrs      HTN  Hyperlipidemia:  - resume PTA amlodipine, ASA, labetalol, statin    Hypothyroidism:  - resume PTA levothyroixine       Diet: NPO except meds  DVT Prophylaxis: Pneumatic Compression Devices  Johnson Catheter: in place, indication:  chronic indwelling cathter  Code Status: full code, discussed with     Disposition Plan   Expected discharge: 2 - 3 days, recommended to prior living arrangement once improved mental status, final urine culture results.  Entered: Socrates Baptiste MD 10/31/2019, 4:11 PM     The patient's care was discussed with the Patient's Family.    Socrates Baptiste MD  [facility]    ______________________________________________________________________    Chief Complaint   Altered mental status    Unable to obtain a history from the patient due to altered mental status  Hx obtained from ED SHARAD Zuluaga and discussing with patient's  Keshawn    History of Present Illness   Gem Jade is a 73 year old female with multiple medical problems including recurrent cathter associated UTI, CKD with hx of renal transplant, type II DM, HTN, hyperlipidemia, bedbound who was brought to the ED due to altered mental status.     Per  report, patient had her cathter changed yesterday. He states she has been in her usual state until yesterday evening when she became lethargy, less responsive.  He reports that she did not take her evening medication and this morning due to her altered mental status. She also has not had much to eat since yesterday.   is her primary care provider. He denies patient having any fevers, diarrheal illness, vomiting or patient complaining of anything specific.      In the ED, temp is 98.2, other vitals stable other than mildly increased blood pressure. Laboratory shows creatinine of 2.69, normal LFTs, Lactic acid of 0.8, glucose 152. WBC 8.1.  UA is grossly abnormal with positive nitrite, LEstrase, >182 WBC.  Patient is lethargic, but does  open her eyes to her name during visit, but does not verbally respond back or appropriately follow commands.  reports that is typical of her prior UTIs. Per discussion with ED RN, it seems like they cleaned her up of what seemed like diarrheal stool. Per  no diarrhea at home    Review of Systems    Review of system was obtained from  due to patient's current mental status as mentioned in the HPI    Past Medical History        Recurrent UTI  Indwelling rico cathter  Hyperlipidemia  Hx of renal transplant, CKD III-IV, most recent creatinine in last 2 months ranging from 2.23-2.4  Hx of PE after knee replacement   Type II DM with diabetic retinopathy, neuropathy, gastroparesis  Osteopenia  Bedbound  Hypothyroidism    Past Surgical History   I have reviewed this patient's surgical history and updated it with pertinent information if needed.  Past Surgical History:   Procedure Laterality Date     AMPUTATE TOE(S) Right 2019    Procedure: RIGHT FIRST TOE AMPUTATION;  Surgeon: Armen Jefferson DPM;  Location:  OR     C NONSPECIFIC PROCEDURE  10/99    kidney transplant     C NONSPECIFIC PROCEDURE      MRI head, lumbar spine, pelvis     C TOTAL KNEE ARTHROPLASTY  3/03    Knee Replacement, Total right, post op PE     CHOLECYSTECTOMY       HC LEFT HEART CATHETERIZATION      Cardiac Cath, Left Heart, Negative  (@ [facility])     HC LEFT HEART CATHETERIZATION      normal coronary angiogram at [facility], had mild troponin rise (0.71)     HC REMV CATARACT EXTRACAP,INSERT LENS      bilateral cataract repaired, left eye        Social History   I have reviewed this patient's social history and updated it with pertinent information if needed.  Social History     Tobacco Use     Smoking status: Former Smoker     Years: 17.00     Last attempt to quit: 1987     Years since quittin.4     Smokeless tobacco: Never Used   Substance Use Topics     Alcohol use: No     Drug use: No       Family  History   I have reviewed this patient's family history and updated it with pertinent information if needed.   Family History   Problem Relation Age of Onset     Diabetes Mother      Parkinsonism Mother      Diabetes Brother      Hypertension Father         [id number] pneumonia     Heart Disease Father        Prior to Admission Medications   Prior to Admission Medications   Prescriptions Last Dose Informant Patient Reported? Taking?   COMFORT EZ PEN NEEDLES 31G X 6 MM miscellaneous   No No   Sig: USE 4 DAILY OR AS DIRECTED   CRANBERRY PO  Spouse/Significant Other Yes No   Sig: Take 1 tablet by mouth 2 times daily    Lactobacillus Rhamnosus, GG, (CULTURELL) capsule  Spouse/Significant Other No No   Sig: Take 1 capsule by mouth 2 times daily   acetaminophen (TYLENOL) 325 MG tablet  Spouse/Significant Other Yes No   Sig: Take 325-650 mg by mouth every 4 hours as needed    amLODIPine (NORVASC) 5 MG tablet   No No   Sig: TAKE TWO TABLETS (10MG) BY MOUTH DAILY   aspirin 81 MG tablet  Spouse/Significant Other Yes No   Sig: Take 81 mg by mouth daily   baclofen (LIORESAL) 10 MG tablet  Spouse/Significant Other No No   Sig: Take 1 tablet (10 mg) by mouth 2 times daily as needed for other (Bladder Spasms)   blood glucose (NO BRAND SPECIFIED) lancets standard   No No   Sig: Use to test blood sugar 3 times daily or as directed. (Accuchek guide)   blood glucose (NO BRAND SPECIFIED) test strip   No No   Sig: Use to test blood sugar 3 times daily or as directed. (accuchek guide)   cyanocobalamin (CYANOCOBALAMIN) 1000 MCG/ML injection  Spouse/Significant Other No No   Sig: Inject 1 mL (1,000 mcg) into the muscle every 2 months   diphenoxylate-atropine (LOMOTIL) 2.5-0.025 MG tablet  Spouse/Significant Other No No   Sig: Take 1-2 tablets by mouth 4 times daily as needed for diarrhea   famotidine (PEPCID) 20 MG tablet  Spouse/Significant Other Yes No   Sig: Take 1 tablet (20 mg) by mouth 2 times daily   gabapentin (NEURONTIN) 100 MG  "capsule   No No   Sig: TAKE ONE CAPSULE BY MOUTH NIGHTLY AS NEEDED FOR SEVERE PAIN   gabapentin (NEURONTIN) 300 MG capsule   No No   Sig: Take 1 capsule (300 mg) by mouth 2 times daily   gabapentin (NEURONTIN) 300 MG capsule   No No   Sig: TAKE TWO CAPSULES BY MOUTH TWICE A DAY. MAY TAKE ONE ADDITIONAL CAPSULE WITH SEVERE PAIN AT NIGHT, BUT NOT DAILY.   gentamicin irrigation   No No   Sig: Irrigate rico cath weekly 60ml of 400mg of gentamicinin [id number] of 0.9% Sodium Chloride   insulin aspart (NOVOLOG FLEXPEN) 100 UNIT/ML pen   Yes No   Sig: Take 5 units with meals + sliding scale if eating. Sliding scale only at bedtime when not eating. Max 40 units per day. Hold if pre-meal glucose is <90  Sliding scale:   140-199 2u  200-249 4u  250-299 6u  300-349 8u  350-399 10u  Over 400 12u   insulin glargine (LANTUS SOLOSTAR) 100 UNIT/ML pen   No No   Sig: Inject 20 Units Subcutaneous At Bedtime   labetalol (NORMODYNE) 300 MG tablet  Spouse/Significant Other Yes No   Sig: Take 0.5 tablets (150 mg) by mouth 2 times daily   levothyroxine (SYNTHROID/LEVOTHROID) 50 MCG tablet  Spouse/Significant Other No No   Sig: TAKE ONE TABLET BY MOUTH EVERY DAY   lidocaine (XYLOCAINE) 2 % external gel   No No   Sig: Apply topically 3 times daily   loratadine (CLARITIN) 10 MG tablet  Spouse/Significant Other No No   Sig: Take 1 tablet (10 mg) by mouth daily   menthol-zinc oxide (CALMOSEPTINE) 0.44-20.625 % OINT ointment  Spouse/Significant Other Yes No   Sig: Apply topically 4 times daily as needed for skin protection   ondansetron (ZOFRAN ODT) 4 MG ODT tab  Spouse/Significant Other No No   Sig: Take 1 tablet (4 mg) by mouth daily as needed for nausea 30 minutes before getting up in your wheelchair   order for DME   No No   Sig: Equipment being ordered: [employer] gel-T cushion 20 inches wide & 18 inches deep    height 1.676 m (5' 6\"), weight 101.6 kg (223 lb 15.8 oz)   order for DME   No No   Sig: Equipment being ordered: Tegaderm 4x4 "   MICHELLE 6 months  Sacral wound   order for DME   No No   Sig: Equipment being ordered: CPAP mask, [employer] Ahuja FX, nasal pillows system, women's,   Catalog # [id number]    Please send to  DME   order for DME   No No   Sig: Equipment being ordered: Traxx Mobility System, Titan 500, free standing overhead patient lift    Please send to [facility]   pravastatin (PRAVACHOL) 10 MG tablet  Spouse/Significant Other No No   Sig: Take 1 tablet (10 mg) by mouth daily   prazosin (MINIPRESS) 1 MG capsule   No No   Sig: Take 2 capsules (2 mg) by mouth At Bedtime   predniSONE (DELTASONE) 5 MG tablet  Spouse/Significant Other No No   Sig: Take 1 tablet (5 mg) by mouth daily   simethicone (MYLICON) 125 MG CHEW  Spouse/Significant Other Yes No   Sig: Take 1 tablet (125 mg) by mouth as needed for intestinal gas   tacrolimus (GENERIC EQUIVALENT) 1 MG capsule  Spouse/Significant Other No No   Sig: Take 2 capsules (2 mg) by mouth 2 times daily   traMADol (ULTRAM) 50 MG tablet  Spouse/Significant Other No No   Sig: Take 1-2 tablets every 6 hours for post operative pain.   vitamin D3 (CHOLECALCIFEROL) 2000 units (50 mcg) tablet  Spouse/Significant Other Yes No   Sig: Take 4,000 Units by mouth 2 times daily      Facility-Administered Medications: None     Allergies   Allergies   Allergen Reactions     Atorvastatin Calcium      myalgias, muscle aches     Codeine Nausea and Vomiting     Darvocet [Propoxyphene N-Apap] Nausea and Vomiting     Hydromorphone      Levofloxacin Other (See Comments) and Diarrhea     hallucinations     Morphine      Nausea and vomiting     Niaspan [Niacin] GI Disturbance     Flushing even at low dose, GI upset     Percocet [Oxycodone-Acetaminophen]      Vomiting after taking med couple of times     Vicodin [Hydrocodone-Acetaminophen] Nausea and Vomiting       Physical Exam   Vital Signs: Temp: 98.2  F (36.8  C) Temp src: Temporal BP: (!) 150/55 Pulse: 64 Heart Rate: 66 Resp: 16 SpO2: 92 % O2 Device: None (Room air)     Weight: 229 lbs 0 oz    General Appearance: lethargic, opens eyes to name, not following commands  HEENT: NCAT, mouth not examined as patient not opening her mouth, no scleral icterus   Respiratory: clear to auscultation bilaterally, no wheezing  Cardiovascular: regular rate and rhythm  GI: soft, active bowel sounds, possible discomfort to the suprapubic region with deep palpation  Genitourinary: urinary cathter in place  Skin:warm and dry,   Musculoskeletal: prior right hallux amputation, no joint effusion or swelling  Neurologic: lethargic, not following commands  Psychiatric: unable to assess    Data   Data reviewed today: I reviewed all medications, new labs and imaging results over the last 24 hours. I personally reviewed no images or EKG's today.    Recent Labs   Lab 10/31/19  1404   WBC 8.1   HGB 10.7*   MCV 92         POTASSIUM 4.6   CHLORIDE 112*   CO2 22   BUN 56*   CR 2.69*   ANIONGAP 5   ROGER 8.6   *   ALBUMIN 3.2*   PROTTOTAL 8.4   BILITOTAL 0.5   ALKPHOS 89   ALT 16   AST 10     Recent Results (from the past 24 hour(s))   CT Head w/o Contrast    Narrative    CT SCAN OF THE HEAD WITHOUT CONTRAST   10/31/2019 4:42 PM     HISTORY: Altered mental status.    TECHNIQUE:  Axial images of the head and coronal reformations without  IV contrast material. Radiation dose for this scan was reduced using  automated exposure control, adjustment of the mA and/or kV according  to patient size, or iterative reconstruction technique.    COMPARISON: Head CT 8/6/2019    FINDINGS:  Moderate volume loss is present. Patchy white matter  hypoattenuation likely represents chronic small vessel ischemic  change. No evidence of acute ischemia, hemorrhage, mass, mass effect,  or hydrocephalus. The visualized calvarium, tympanic cavities, mastoid  cavities, and paranasal sinuses are unremarkable.      Impression    IMPRESSION:  No acute intracranial abnormality.    ELIZABETH CHIN MD

## 2019-11-01 NOTE — PLAN OF CARE
Discharge Planner SLP   Patient plan for discharge: Unable to state  Current status: SLP: Pt known to this department with recommended soft semi-solids during times of confusion. RN reported good tolerance of meds in applesauce this am. Pt alert and uses head shake/nods for communication at baseline. Pt accepted ice chips and thin liquids by straw with ability to pace self and take small sips at a time. Pt uses chin tuck strategy independently. Puree solids trialed with pt using 2-3 swallows per bite independently (pt nodded head yes that she learned this from previous SLPs). One instance of coughing and sneezing episode with possible pharyngeal retention. No difficulty with liquid wash after.     Recommend: Dysphagia diet level 1 and thin liquids (straws ok) with assist feeding only when pt is alert and as upright as possible.    Barriers to return to prior living situation: None from SLP  Recommendations for discharge: Home with  SLP  Rationale for recommendations: Pt below baseline and would benefit from short term SLP services to advance diet to baseline as indicated       Entered by: Aaliyah Jerome 11/01/2019 11:41 AM

## 2019-11-01 NOTE — PLAN OF CARE
OT/PT: Orders received. Discussed with RN. RN reporting pt lethargic. RN requesting to hold therapy today and to return tomorrow.

## 2019-11-01 NOTE — PLAN OF CARE
DATE & TIME: 11/1/19                      Cognitive Concerns/ Orientation: Patient is somnolent, not very responsive.     BEHAVIOR & AGGRESSION TOOL COLOR: Green   CIWA SCORE: NA    ABNL VS/O2: VSS on RA, patient has home CPAP, keeps taking off.    MOBILITY: Bedrest, Turn and Repo  PAIN MANAGMENT: No signs or symptoms of pain   DIET: NPO due to somnolence   BOWEL/BLADDER: Johnson in place, chronically.    ABNL LAB/BG: NA   DRAIN/DEVICES: PIV infusing  ml/hr.   TELEMETRY RHYTHM: NSR   SKIN: Pressure to coccyx, blanchable redness.   TESTS/PROCEDURES: Pending   D/C DAY/GOALS/PLACE: Pending   OTHER IMPORTANT INFO: Contact and Enteric

## 2019-11-01 NOTE — PROGRESS NOTES
[facility] laboratory called with updated blood culture results. The organism was identified as Staphylococcus Aureus. MD Jay paged with updated result.

## 2019-11-01 NOTE — PLAN OF CARE
"Patient orientation ROBBY due to being non-verbal. AX2 with lift. VSS, RA. FLACC pain score of 2, provided distraction and reposition. Neuros intact, ex patient does not always follow commands and can be difficult to assess at times. Johnson is patent and draining. DD1 diet with thin liquids, BG checks. Patient refused BG checks, and kept pulling hand away. When asked about dinner patient kept shaking head \"no\". Tele NSR. Discharge plans pending.   "

## 2019-11-01 NOTE — CONSULTS
[facility]    Infectious Disease Consultation     Date of Admission:  10/31/2019  Date of Consult (When I saw the patient): 11/01/19    Assessment & Plan   Gem Jade is a 73 year old female who was admitted on 10/31/2019.     Impression:  1. 73 y.o female familiar to me from numerous prior admission with similar presentation.   2. History of renal transplant, chronic indwelling rico. On immunosuppressives.   3. Diabetes.   4. Previous history of MDR.   5. Admitted again with altered mental status.   6. UA grossly abnormal.   7. UC pending.   8. BC growing MSSA.     Recommendations:   IV ancef   Daily blood cultures till clears   Avoid any longterm IV lines   TTE is persistent positive cultures   Recent right toe osteo with MSSA in the cultures. No overt cellulitis on my exam, skin slight redness, incision healed, slight bogginess, will recommend podiatry consult.       Paramjit Mckenzie MD    Reason for Consult   Reason for consult: I was asked by Dr. Jay to evaluate this patient for altered mental status. .    Primary Care Physician   Marivel Barcenas NP    Chief Complaint   Altered mental status     History is obtained from the patient and medical records    History of Present Illness   Gem Jade is a 73 year old female with multiple medical problems including recurrent cathter associated UTI (last admitted in 8/2019), CKD with hx of renal transplant, type II DM, HTN, hyperlipidemia, bedbound who was is being admitted due to altered mental status     Past Medical History   I have reviewed this patient's medical history and updated it with pertinent information if needed.   Past Medical History:   Diagnosis Date     Background diabetic retinopathy(362.01)     extensive diabetic retinopathy, s/p multiple laser treatments     Diabetic gastroparesis (H)     followed by [facility] (Dr. Chen)     Diarrhea      Dizziness and giddiness      Hyponatremia 12/16/11    Na 121     Kidney  replaced by transplant      Mixed hyperlipidemia      Obesity      Osteopenia 11/07    per DEXA     Other and unspecified hyperlipidemia      Other pulmonary embolism and infarction 3/03    Bilateral pulmonary emboli post op after knee replacement     Polyneuropathy in diabetes(357.2)      Primary localized osteoarthrosis, lower leg      Pyelonephritis, unspecified      Type 2 diabetes mellitus with complications (H)      Urinary tract infection, site not specified     Chronic UTIs     UTI (urinary tract infection) due to urinary indwelling catheter (H)        Past Surgical History   I have reviewed this patient's surgical history and updated it with pertinent information if needed.  Past Surgical History:   Procedure Laterality Date     AMPUTATE TOE(S) Right 7/19/2019    Procedure: RIGHT FIRST TOE AMPUTATION;  Surgeon: Armen Jefferson DPM;  Location: [facility] OR     C NONSPECIFIC PROCEDURE  10/99    kidney transplant     C NONSPECIFIC PROCEDURE  8/00    MRI head, lumbar spine, pelvis     C TOTAL KNEE ARTHROPLASTY  3/03    Knee Replacement, Total right, post op PE     CHOLECYSTECTOMY       HC LEFT HEART CATHETERIZATION  1999    Cardiac Cath, Left Heart, Negative  (@ [facility])     HC LEFT HEART CATHETERIZATION  4/05    normal coronary angiogram at [facility], had mild troponin rise (0.71)     HC REMV CATARACT EXTRACAP,INSERT LENS  8/04    bilateral cataract repaired, left eye        Prior to Admission Medications   Prior to Admission Medications   Prescriptions Last Dose Informant Patient Reported? Taking?   COMFORT EZ PEN NEEDLES 31G X 6 MM miscellaneous  Spouse/Significant Other No No   Sig: USE 4 DAILY OR AS DIRECTED   CRANBERRY PO 10/30/2019 at Unknown time Spouse/Significant Other Yes Yes   Sig: Take 1 tablet by mouth 2 times daily    Lactobacillus Rhamnosus, GG, (CULTURELL) capsule 10/30/2019 at Unknown time Spouse/Significant Other No Yes   Sig: Take 1 capsule by mouth 2 times daily   acetaminophen (TYLENOL) 325 MG tablet   Spouse/Significant Other Yes Yes   Sig: Take 325-650 mg by mouth every 4 hours as needed    amLODIPine (NORVASC) 5 MG tablet 10/30/2019 at Unknown time Spouse/Significant Other No Yes   Sig: TAKE TWO TABLETS (10MG) BY MOUTH DAILY   aspirin 81 MG tablet 10/30/2019 at Unknown time Spouse/Significant Other Yes Yes   Sig: Take 81 mg by mouth daily   baclofen (LIORESAL) 10 MG tablet prn at Unknown time Spouse/Significant Other No Yes   Sig: Take 1 tablet (10 mg) by mouth 2 times daily as needed for other (Bladder Spasms)   blood glucose (NO BRAND SPECIFIED) lancets standard  Spouse/Significant Other No No   Sig: Use to test blood sugar 3 times daily or as directed. (Accuchek guide)   blood glucose (NO BRAND SPECIFIED) test strip  Spouse/Significant Other No No   Sig: Use to test blood sugar 3 times daily or as directed. (accuchek guide)   cyanocobalamin (CYANOCOBALAMIN) 1000 MCG/ML injection More than a month at Unknown time Spouse/Significant Other No No   Sig: Inject 1 mL (1,000 mcg) into the muscle every 2 months   diphenoxylate-atropine (LOMOTIL) 2.5-0.025 MG tablet prn Spouse/Significant Other No Yes   Sig: Take 1-2 tablets by mouth 4 times daily as needed for diarrhea   famotidine (PEPCID) 20 MG tablet 10/30/2019 at Unknown time Spouse/Significant Other Yes Yes   Sig: Take 1 tablet (20 mg) by mouth 2 times daily   gabapentin (NEURONTIN) 100 MG capsule prn Spouse/Significant Other No Yes   Sig: TAKE ONE CAPSULE BY MOUTH NIGHTLY AS NEEDED FOR SEVERE PAIN   gabapentin (NEURONTIN) 300 MG capsule 10/30/2019 at Unknown time Spouse/Significant Other No Yes   Sig: Take 1 capsule (300 mg) by mouth 2 times daily   gentamicin irrigation prn Spouse/Significant Other No Yes   Sig: Irrigate rico cath weekly 60ml of 400mg of gentamicinin [id number] of 0.9% Sodium Chloride   insulin aspart (NOVOLOG FLEXPEN) 100 UNIT/ML pen  Spouse/Significant Other Yes Yes   Sig: Take 10 units with meals + sliding scale if eating. Sliding scale  "only at bedtime when not eating. Max 40 units per day. Hold if pre-meal glucose is <90  Sliding scale:   140-199 2u  200-249 4u  250-299 6u  300-349 8u  350-399 10u  Over 400 12u   insulin glargine (LANTUS SOLOSTAR) 100 UNIT/ML pen 10/30/2019 at Unknown time Spouse/Significant Other No Yes   Sig: Inject 20 Units Subcutaneous At Bedtime   labetalol (NORMODYNE) 300 MG tablet 10/30/2019 at Unknown time Spouse/Significant Other Yes Yes   Sig: Take 0.5 tablets (150 mg) by mouth 2 times daily   levothyroxine (SYNTHROID/LEVOTHROID) 50 MCG tablet 10/30/2019 at Unknown time Spouse/Significant Other No Yes   Sig: TAKE ONE TABLET BY MOUTH EVERY DAY   lidocaine (XYLOCAINE) 2 % external gel prn Spouse/Significant Other No Yes   Sig: Apply topically 3 times daily   Patient taking differently: Apply topically 3 times daily as needed    loratadine (CLARITIN) 10 MG tablet 10/30/2019 at Unknown time Spouse/Significant Other No Yes   Sig: Take 1 tablet (10 mg) by mouth daily   menthol-zinc oxide (CALMOSEPTINE) 0.44-20.625 % OINT ointment prn Spouse/Significant Other Yes Yes   Sig: Apply topically 4 times daily as needed for skin protection   ondansetron (ZOFRAN ODT) 4 MG ODT tab prn Spouse/Significant Other No Yes   Sig: Take 1 tablet (4 mg) by mouth daily as needed for nausea 30 minutes before getting up in your wheelchair   order for DME  Spouse/Significant Other No No   Sig: Equipment being ordered: Rixty gel-T cushion 20 inches wide & 18 inches deep    height 1.676 m (5' 6\"), weight 101.6 kg (223 lb 15.8 oz)   order for DME  Spouse/Significant Other No No   Sig: Equipment being ordered: Tegaderm 4x4   MICHELLE 6 months  Sacral wound   order for DME  Spouse/Significant Other No No   Sig: Equipment being ordered: CPAP mask, ResMed Ahuja FX, nasal pillows system, women's,   Catalog # [id number]    Please send to  DME   order for DME  Spouse/Significant Other No No   Sig: Equipment being ordered: Traxx Mobility System, Titan 500, free " standing overhead patient lift    Please send to [facility]   pravastatin (PRAVACHOL) 10 MG tablet 10/30/2019 at Unknown time Spouse/Significant Other No Yes   Sig: Take 1 tablet (10 mg) by mouth daily   prazosin (MINIPRESS) 1 MG capsule 10/29/2019 at Unknown time Spouse/Significant Other No Yes   Sig: Take 2 capsules (2 mg) by mouth At Bedtime   predniSONE (DELTASONE) 5 MG tablet 10/30/2019 at Unknown time Spouse/Significant Other No Yes   Sig: Take 1 tablet (5 mg) by mouth daily   simethicone (MYLICON) 125 MG CHEW prn Spouse/Significant Other Yes Yes   Sig: Take 1 tablet (125 mg) by mouth as needed for intestinal gas   tacrolimus (GENERIC EQUIVALENT) 1 MG capsule 10/30/2019 at Unknown time Spouse/Significant Other No Yes   Sig: Take 2 capsules (2 mg) by mouth 2 times daily   traMADol (ULTRAM) 50 MG tablet prn Spouse/Significant Other No Yes   Sig: Take 1-2 tablets every 6 hours for post operative pain.   vitamin D3 (CHOLECALCIFEROL) 2000 units (50 mcg) tablet 10/30/2019 at Unknown time Spouse/Significant Other Yes Yes   Sig: Take 2,000 Units by mouth 2 times daily       Facility-Administered Medications: None     Allergies   Allergies   Allergen Reactions     Atorvastatin Calcium      myalgias, muscle aches     Codeine Nausea and Vomiting     Darvocet [Propoxyphene N-Apap] Nausea and Vomiting     Hydromorphone      Levofloxacin Other (See Comments) and Diarrhea     hallucinations     Morphine      Nausea and vomiting     Niaspan [Niacin] GI Disturbance     Flushing even at low dose, GI upset     Percocet [Oxycodone-Acetaminophen]      Vomiting after taking med couple of times     Vicodin [Hydrocodone-Acetaminophen] Nausea and Vomiting       Immunization History   Immunization History   Administered Date(s) Administered     FLU 6-35 months 11/05/2011     Flu, Unspecified 10/13/1997     HepB 01/13/2003     HepB-Adult 01/13/2003     Influenza (H1N1) 11/25/2009     Influenza (High Dose) 3 valent vaccine 12/01/2015,  10/07/2016     Influenza (IIV3) PF 11/30/2000, 11/04/2002, 11/14/2003, 10/22/2004, 11/03/2005, 12/04/2006, 11/12/2007, 10/21/2008, 10/06/2009, 11/09/2010, 11/05/2011     Influenza Vaccine IM > 6 months Valent IIV4 10/16/2013     Pneumo Conj 13-V (2010&after) 12/01/2015     Pneumococcal 23 valent 06/17/2013     TDAP Vaccine (Adacel) 06/17/2013     Zoster vaccine, live 06/17/2013       Social History   I have reviewed this patient's social history and updated it with pertinent information if needed. Gem Jade  reports that she quit smoking about 32 years ago. She quit after 17.00 years of use. She has never used smokeless tobacco. She reports that she does not drink alcohol or use drugs.    Family History   I have reviewed this patient's family history and updated it with pertinent information if needed.   Family History   Problem Relation Age of Onset     Diabetes Mother      Parkinsonism Mother      Diabetes Brother      Hypertension Father         [id number] pneumonia     Heart Disease Father        Review of Systems   The 10 point Review of Systems is negative other than noted in the HPI or here.     Physical Exam   Temp: 98.2  F (36.8  C) Temp src: Axillary BP: (!) 154/63 Pulse: 67 Heart Rate: 80 Resp: 16 SpO2: 97 % O2 Device: None (Room air)    Vital Signs with Ranges  Temp:  [97.3  F (36.3  C)-98.3  F (36.8  C)] 98.2  F (36.8  C)  Pulse:  [60-67] 67  Heart Rate:  [66-80] 80  Resp:  [16-18] 16  BP: (125-156)/(51-91) 154/63  SpO2:  [92 %-100 %] 97 %  227 lbs 1.18 oz  Body mass index is 33.53 kg/m .    GENERAL APPEARANCE:  Lethargic   EYES: Eyes grossly normal to inspection, PERRL and conjunctivae and sclerae normal  HENT: ear canals and TM's normal and nose and mouth without ulcers or lesions  NECK: no adenopathy, no asymmetry, masses, or scars and thyroid normal to palpation  RESP: lungs clear to auscultation - no rales, rhonchi or wheezes  CV: regular rates and rhythm, normal S1 S2, no S3 or S4 and no  murmur, click or rub  LYMPHATICS: normal ant/post cervical and supraclavicular nodes  ABDOMEN: soft, nontender, without hepatosplenomegaly or masses and bowel sounds normal  MS: extremities right great toe s/p amputation, incision okl   SKIN: no suspicious lesions or rashes      Data   Lab Results   Component Value Date    WBC 6.9 11/01/2019    HGB 10.4 (L) 11/01/2019    HCT 33.7 (L) 11/01/2019     (L) 11/01/2019     11/01/2019    POTASSIUM 4.1 11/01/2019    CHLORIDE 114 (H) 11/01/2019    CO2 19 (L) 11/01/2019    BUN 47 (H) 11/01/2019    CR 2.50 (H) 11/01/2019     (H) 11/01/2019    SED 53 (H) 05/25/2016    NTBNPI 660 01/10/2016    TROPONIN 0.00 02/28/2019    TROPI <0.015 06/02/2019    AST 10 10/31/2019    ALT 16 10/31/2019    ALKPHOS 89 10/31/2019    BILITOTAL 0.5 10/31/2019    NOREEN 18 07/19/2019    INR 1.09 02/28/2019     Recent Labs   Lab 10/31/19  1404   CULT No growth after 14 hours  No growth after 14 hours     Recent Labs   Lab Test 10/31/19  1404 09/17/19  1230 08/06/19  1343 07/19/19  1455 07/15/19  1536 07/15/19  1535 06/04/19  0738 06/02/19  2122 06/02/19  2111   CULT No growth after 14 hours  No growth after 14 hours >100,000 colonies/mL  mixed urogenital santos  Susceptibility testing not routinely done   No growth  No growth  >100,000 colonies/mL  Citrobacter freundii complex  *  10,000 to 50,000 colonies/mL  Candida albicans / dubliniensis  Candida albicans and Candida dubliniensis are not routinely speciated  Susceptibility testing not routinely done  * No anaerobes isolated  Light growth  Staphylococcus aureus  * Cultured on the 2nd day of incubation:  Corynebacterium striatum  Identification obtained by MALDI-TOF mass spectrometry research use only database. Test   characteristics determined and verified by the Infectious Diseases Diagnostic Laboratory   ([facility]) [place], [place].  *  Critical Value/Significant Value, preliminary result only, called to and read back  by  Paddy Breen Rn @ 0752 7.17.19 CS    (Note)  NEGATIVE for the following: Staphylococcus spp., Staph aureus, Staph  epidermidis, Staph lugdunensis, Streptococcus spp., Strep pneumoniae,  Strep pyogenes, Strep agalactiae, Strep anginosus group, Enterococcus  faecalis, Enterococcus faecium, and Listeria spp. by Verigene  multiplex nucleic acid test. Final identification and antimicrobial  susceptibility testing will be verified by standard methods.    Critical Value/Significant Value called to and read back by Paddy Breen RN @ 1042 7.17.19 CS     No growth  50,000 to 100,000 colonies/mL  Staphylococcus aureus  * No growth >100,000 colonies/mL  Citrobacter freundii complex  *  >100,000 colonies/mL  Staphylococcus aureus  * Cultured on the 1st day of incubation:  Staphylococcus capitis  This isolate DOES NOT demonstrate inducible clindamycin resistance in vitro. Clindamycin   is susceptible and could be used when indicated, however, erythromycin is resistant and   should not be used.  *  Critical Value/Significant Value, preliminary result only, called to and read back by  Jyoti Brower RN on 06.03.2019 at 2151. JRT    (Note)  POSITIVE for Staphylococci other than S.aureus, S.epidermidis and  S.lugdunensis, by Verigene multiplex nucleic acid test.  Coagulase-negative staphylococci are the most common venipuncture or  collection associated skin CONTAMINANTS grown in blood cultures.  Final identification and antimicrobial susceptibility testing will be  verified by standard methods.    Specimen tested with Verigene multiplex, gram-positive blood culture  nucleic acid test for the following targets: Staph aureus, Staph  epidermidis, Staph lugdunensis, other Staph species, Enterococcus  faecalis, Enterococcus faecium, Streptococcus species, S. agalactiae,  S. anginosus grp., S. pneumoniae, S. pyogenes, Listeria sp., mecA  (methicillin resistance) and Viridiana/B (vancomycin resistance).    Critical  Value/Significant Value called to and read back by  DENNIS RAMIREZ RN ([id number]).  06.04.19  0028 GJS         Amount of time performed on this consult: 45 minutes. This includes face to face assessment and care coordination with the primary team.

## 2019-11-01 NOTE — CONSULTS
"Care Transition Initial Assessment - RN  pt is well known to Care Transition staff for multiple admissions  SLP is recommending home with homecare, awaiting PT and OT eval (waitlisted)  completing consult with available data, CTS-RN & SW will continue to follow    DATA   Active Problems:    Altered mental status       Cognitive Status: \"Very lethargic. ROBBY to assess orientation, patient is not verbalizing responses\" per nursing notes.      Contact information and PCP information verified: Yes, Pt is [facility] by [facility]  ANTHONY Edgar CNP in her home due to bedbound status         Insurance concerns: No Insurance issues identified  + has active MEDICARE/MEDICARE and COMMERCIAL/[place] BASIC MEDICARE SUPPLEMENT PLAN    ASSESSMENT  Patient currently receives the following services:    + open to [facility] for RN and HHA   + patient has chronic indwelling Johnson catheter        Identified issues/concerns regarding health management:   + pt is still medically active, awaiting recommendations from all disciplines     PLAN  Financial costs for the patient include: copays .  Patient given options and choices for discharge N/A .  Patient/family is agreeable to the plan?  TBD  Patient anticipates discharging to Home with Homecare .   Patient anticipates needs for home equipment: No  Transportation/person available to transport on day of discharge  is: staff to arrange [facility] medical transportation in collaboration with pt's spouse   Plan/Disposition: at this time, Home with Homecare   Appointments:     + PCP visit will need to be arranged     Care  (CTS) will continue to follow as needed.    Shannon Gu RN, BSN, PHN  [facility] Care Coordination  [facility]   Mobile: [phone]      "

## 2019-11-01 NOTE — PROGRESS NOTES
11/01/19 1442   General Information   Onset Date 10/31/19   Start of Care Date 11/01/19   Referring Physician Magalis Jay MD   Patient Profile Review/OT: Additional Occupational Profile Info See Profile for full history and prior level of function   Patient/Family Goals Statement did not state   Swallowing Evaluation Bedside swallow evaluation   Behaviorial Observations Alert  (nonverbal)   Mode of current nutrition NPO   Comments Pt admitted with UTI. Hx of SLP services when confused during previous admissions. Recommended soft solids and thin liquids.    Clinical Swallow Eval: Thin Liquid Texture Trial   Mode of Presentation, Thin Liquids straw;fed by clinician   Oral Phase of Swallow WFL   Pharyngeal Phase of Swallow intact   Diagnostic Statement independently uses chin tuck   Clinical Swallow Eval: Puree Solid Texture Trial   Mode of Presentation, Puree spoon;fed by clinician   Oral Phase, Puree WFL   Pharyngeal Phase, Puree impaired;repeated swallows   Diagnostic Statement pt nodded head yes that she uses double swallows as a strategy? one coughing/sneezing episode   Swallow Eval: Clinical Impressions   Skilled Criteria for Therapy Intervention Skilled criteria met.  Treatment indicated.   Functional Assessment Scale (FAS) 5   Treatment Diagnosis mild oropharyngeal dysphagia   Diet texture recommendations Dysphagia diet level 1;Thin liquids   Recommended Feeding/Eating Techniques alternate between small bites and sips of food/liquid;check mouth frequently for oral residue/pocketing;hard swallow w/ each bite or sip;maintain upright posture during/after eating for 30 mins;small sips/bites;tuck chin during every swallow   Demonstrates Need for Referral to Another Service dietitian   Therapy Frequency 5x/week   Predicted Duration of Therapy Intervention (days/wks) 1 week   Anticipated Discharge Disposition home w/ home health   Risks and Benefits of Treatment have been explained. Yes   Patient, family  and/or staff in agreement with Plan of Care Yes   Clinical Impression Comments SLP: Pt known to this department with recommended soft semi-solids during times of confusion. RN reported good tolerance of meds in applesauce this am. Pt alert and uses head shake/nods for communication at baseline. Pt accepted ice chips and thin liquids by straw with ability to pace self and take small sips at a time. Pt uses chin tuck strategy independently. Puree solids trialed with pt using 2-3 swallows per bite independently (pt nodded head yes that she learned this from previous SLPs). One instance of coughing and sneezing episode with possible pharyngeal retention. No difficulty with liquid wash after. Recommend: Dysphagia diet level 1 and thin liquids (straws ok) with assist feeding only when pt is alert and as upright as possible.   Total Evaluation Time   Total Evaluation Time (Minutes) 20

## 2019-11-01 NOTE — PHARMACY-VANCOMYCIN DOSING SERVICE
Pharmacy Vancomycin Initial Note  Date of Service 2019  Patient's  1945  73 year old, female    Indication: Bacteremia    Current estimated CrCl = Estimated Creatinine Clearance: 26 mL/min (A) (based on SCr of 2.5 mg/dL (H)).    Creatinine for last 3 days  10/31/2019:  2:04 PM Creatinine 2.69 mg/dL  2019:  9:20 AM Creatinine 2.50 mg/dL    Recent Vancomycin Level(s) for last 3 days  No results found for requested labs within last 72 hours.      Vancomycin IV Administrations (past 72 hours)      No vancomycin orders with administrations in past 72 hours.                Nephrotoxins and other renal medications (From now, onward)    Start     Dose/Rate Route Frequency Ordered Stop    19 1330  vancomycin (VANCOCIN) 2,000 mg in sodium chloride 0.9 % 500 mL intermittent infusion      2,000 mg  over 2 Hours Intravenous ONCE 19 1315      10/31/19 2100  tacrolimus (GENERIC EQUIVALENT) capsule 2 mg      2 mg Oral 2 TIMES DAILY 10/31/19 1708            Contrast Orders - past 72 hours (72h ago, onward)    None                Plan:  1.  Start vancomycin  2gm x1 dose.   2.  Goal Trough Level: 15-20 mg/L until cultures back  3.  Pharmacy will check trough levels as appropriate in 1-3 Days.    4. Serum creatinine levels will be ordered daily for the first week of therapy and at least twice weekly for subsequent weeks.    5. Roulette method utilized to dose vancomycin therapy: Method 1    Myrna Ayala [facility]

## 2019-11-01 NOTE — PROGRESS NOTES
[facility]  Patient is currently open to home care services with [facility]. The patient is currently receiving RN and HHA services. [facility]  and team have been notified of patient admission. [facility] liaison will continue to follow patient during stay. If appropriate provide orders to resume home care at time of discharge.

## 2019-11-01 NOTE — PLAN OF CARE
DATE & TIME: 10/31 evening shift  Cognitive Concerns/ Orientation : patient not responding verbally and refusing to open mouth to take anything orally including water- unable to give meds thus far.   BEHAVIOR & AGGRESSION TOOL COLOR: green/yellow- encephalopathic.   CIWA SCORE: N/A   ABNL VS/O2: VSS, mild HTN. Sleeping with nasal cpap  MOBILITY: turn and repo q2hr. bariatric  PAIN MANAGMENT: doesn't appear in pain, not responding to questions  DIET: NPO except meds. Working on trying to get patient to take med for hx of kidney transplant- oral only availability at this time update: successful with water.  BOWEL/BLADDER: incontinent B&B, one small hard stool, but in r/o c-diff, needs sample sent if recurs with diarrhea.  ABNL LAB/BG: crt. 2,69, urea nitrogen 56. BGM: 153, 103  DRAIN/DEVICES: PIV infusing NS at 100ml/hr, rico with adequate output  TELEMETRY RHYTHM: NSR  SKIN: small opening on coccyx, with blanchable redness surrounding, reddened panus.  TESTS/PROCEDURES: pending  D/C DAY/GOALS/PLACE: pending  OTHER IMPORTANT INFO: iso for VRE, MRSA, ESBL and enteric.

## 2019-11-01 NOTE — PLAN OF CARE
DATE & TIME: 11/1/19 Day  Cognitive Concerns/ Orientation : Very lethargic. ROBBY to assess orientation, patient is not verbalizing responses. Occasionally follows commands/nods yes or no.   BEHAVIOR & AGGRESSION TOOL COLOR: Green  CIWA SCORE: N/A  ABNL VS/O2: VSS, ex HTN. Room Air.   MOBILITY: Bedrest. Turn Q2H.   PAIN MANAGMENT: Does not appear to be in pain  DIET: Dysphagia Level 1, thin liquids. Tolerated pills well in applesauce.   BOWEL/BLADDER: Chronic Johnson catheter in place. No BM this shift.   ABNL LAB/BG: , 149. Creatinine 2.5. Positive blood cultures.   DRAIN/DEVICES: PIV infusing NS @100ml/hr  TELEMETRY RHYTHM: NSR.   SKIN: Small opened area on sacrum, mepilex dressing in place. Irritation/reddness in brandie-area.   TESTS/PROCEDURES: CXR  D/C DAY/GOALS/PLACE: TBD  OTHER IMPORTANT INFO: Contact and Enteric isolation. Per pt.  at baseline patient is very talkative and chatty.

## 2019-11-01 NOTE — PROGRESS NOTES
[facility]  Hospitalist Progress Note   11/01/2019          Assessment and Plan:       Gem Jade is a 73 year old female with history of recurrent cathter associated UTI (last admitted in 8/2019), CKD with hx of renal transplant, type II DM, HTN, hyperlipidemia, bedbound admitted due to altered mental status and UTI     Acute encephalopathy likely multifactorial.  Patient having recurrent encephalopathy in the setting of UTIs.  Prior to admission on Neurontin and baclofen.  Multiple admissions since the beginning of this year, concern if she has any cognitive impairment at baseline.    CT head no acute pathology.  Bump in creatinine noted.  Liver function test within normal limit.  Your chart TSH normal in July 2019.  Vitamin B12 normal in June 2019.  Noted UTI, treating as below.  Hold PTA Neurontin and baclofen, tramadol.  Check CRP, CPK levels, folic acid levels.  Continue neurochecks today.  Mental status closely.  PT, OT, speech therapy requested.  We will need to consider cognitive eval as outpatient.    Catheter associated UTI recurrent.  Gram-positive bacteremia.  Diarrhea rule out C. difficile infection.  Afebrile on admission.  W BC within normal limits.UA +nitrites, LE, >180 WBC. Lactic acid is normal  Blood cultures preliminary with gram-positive cocci in clusters.  Started on IV ceftriaxone [10/31] on admission to continue   Catheter last changed at home on 10/30, care.  Had Diarrhea overnight, C. Difficile testing pending.  We will start on vancomycin (11/1), dosing per pharmacy.  Repeat blood cultures and follow-up final results. Follow Urine culture results.  Ordered chest x-ray to rule out infiltrates.  We will check CRP levels.  Monitor WBC count, fever curve.  Infectious disease consulted for antibiotic recommendations given history of recurrent UTIs, gram-positive bacteremia.     Acute kidney injury  Hx of renal transplant and CKD-III-IV, most recent creatinine range  2.2-2.4  Renal transplant in 1999. Living donor.   Contiue hydration with normal saline @100cc/hr   Monitor urine output, monitor renal function a.m.  History of recurrent urinary tract infections, acute on chronic kidney injury since the beginning of this year.  Nephrology consulted.  Discussed with Dr. Seymour who will evaluate the patient.  Defer to nephrology for transplant kidney ultrasound.  Continue with PTA Prednisone and Tacrolimus  Avoid NSAIDs/nephrotoxins     Type II DM with diabetic retinopathy, neuropathy, gastroparesis   Hemoglobin 6.8 [8/2019)  Will restart PTA Lantus at 20 units at bedtime  Restart PTA aspartat at lower dose of 5 units with meals.  [PTA aspart 10 units]  Low intensity sliding scale insulin.  Monitor blood sugars closely.     HTN  Hyperlipidemia:  Continue PTA amlodipine, labetalol.  Continue PTA aspirin, pravastatin.  Continue PTA prazosin.     Hypothyroidism:  Continue PTA levothyroxine    GERD.  Continue PTA famotidine    History of diabetic neuropathy and back pain.    Continue to hold PTA Neurontin and baclofen as above    History of provoked bilateral PE:   Not on anticoagulation. Follow with PCP    History of paroxysmal atrial fibrillation.  Not on anticoagulation prior to admission.    Obstructive sleep apnea on CPAP:   CPAP as per home settings    History of seizure disorder.  On review of chart prior to admission not on seizure medication.    Obesity with a BMI of 33.53.  We will need to consider lifestyle modification with diet and exercise as able to.    Physical deconditioning from acute illness, chronic debility.  Patient at home, on review of previous records mostly bedbound.  Admitted multiple times since the beginning of this year for acute encephalopathy in the setting of recurrent UTIs.  PT, OT eval requested.  Mental status improving, will request speech therapy for dietary eval.     Orders Placed This Encounter      Combination Diet Dysphagia Diet Level 1: Pureed;  Thin Liquids (water, ice chips, juice, milk gelatin, ice cream, etc)      DVT Prophylaxis: SCDs.  Start on subcutaneous heparin given history of PE, monitor platelets a.m.  Code Status: Full Code  Disposition: Expected discharge in 2 days pending clinical improvement    Discussed with patient, nephrology, bedside RN  Total time greater than 35 minutes.    Magalis Jay MD        Interval History:      Patient up and awake, lying in bed.  Appears comfortable.  Nonverbal, able to nod with a yes or no.  Denies any chest pain or shortness of breath.  Denies any headache or dizziness.  No nausea vomiting.  This morning tolerated pills with applesauce.         Physical Exam:        Physical Exam   Temp:  [97.3  F (36.3  C)-98.3  F (36.8  C)] 98.3  F (36.8  C)  Heart Rate:  [66-81] 81  Resp:  [16-18] 16  BP: (146-154)/(63-73) 150/68  SpO2:  [96 %-99 %] 99 %    Intake/Output Summary (Last 24 hours) at 11/1/2019 1614  Last data filed at 11/1/2019 1412  Gross per 24 hour   Intake --   Output 2450 ml   Net -2450 ml       Admission Weight: 103.9 kg (229 lb)  Current Weight: 103 kg (227 lb 1.2 oz)    PHYSICAL EXAM  GENERAL: Patient is in no distress. Alert and oriented.  HEENT: Oropharynx dry, pupils equal  HEART: Regular rate and rhythm. S1S2. No murmurs  LUNGS: Bilateral decreased breath sounds, no wheezing no crackles.  ABDOMEN: Soft, no abdominal tenderness, bowel sounds heard   No suprapubic tenderness, no costovertebral angle tenderness.  NEURO: Moving all extremities.    EXTREMITIES: pedal edema. 2+ peripheral pulses.  SKIN: Warm, dry. No rash or bruising.  PSYCHIATRY Cooperative       Medications:          amLODIPine  10 mg Oral Daily     aspirin  81 mg Oral QPM     ceFAZolin  2 g Intravenous Q8H     famotidine  20 mg Oral At Bedtime     insulin aspart  1-6 Units Subcutaneous Q4H     labetalol  150 mg Oral BID     levothyroxine  50 mcg Oral Daily     pravastatin  10 mg Oral QPM     prazosin  2 mg Oral At Bedtime      predniSONE  5 mg Oral Daily     sodium chloride (PF)  3 mL Intracatheter Q8H     tacrolimus  2 mg Oral BID     acetaminophen, glucose **OR** dextrose **OR** glucagon, lidocaine 4%, lidocaine (buffered or not buffered), melatonin, naloxone, ondansetron **OR** ondansetron, polyethylene glycol, senna-docusate **OR** senna-docusate, sodium chloride (PF)         Data:      All new lab and imaging data was reviewed.
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
## 2019-11-02 NOTE — PLAN OF CARE
DATE & TIME: 11/02/19 0270-4936   Cognitive Concerns/ Orientation : A&O x3, disoriented to time.  Improved mentation and very talkative this shift.   BEHAVIOR & AGGRESSION TOOL COLOR: Green  CIWA SCORE: NA   ABNL VS/O2: VSS RA  MOBILITY: Assist of 2/ lift; turn/repo q 2 hrs  PAIN MANAGMENT: Denies pain  DIET: DD1 pureed with this liquids  BOWEL/BLADDER: Johnson, no bm this shift  ABNL LAB/BG:   DRAIN/DEVICES: Johnson intact and patent; PIV R arm with IVF infusing   TELEMETRY RHYTHM: NSR  SKIN: Small opened area on sacrum, mepilex dressing in place. Irritation/reddness in brandie-area.   TESTS/PROCEDURES: None  D/C DAY/GOALS/PLACE: Discharge pending  OTHER IMPORTANT INFO: Contact/enteric isolation maintained. Continues on IV abx. PT/OT/Speech/ID/Neph following

## 2019-11-02 NOTE — PLAN OF CARE
Patient is confused, 2 assist with lift, refused everything, emotional and sad. Refused all her night medications,  tried to convince her but she adamantly with tears on her eyes.

## 2019-11-02 NOTE — PROVIDER NOTIFICATION
MD Notification    Notified Person: MD Frazier    Notification Date/Time: 11/2/19 5845    Notification Interaction: Web page    Purpose of Notification: Patient refused lab draws two times this shift.    Orders Received:    Comments:

## 2019-11-02 NOTE — CONSULTS
Patient seen for initial psychiatric consultation. Refer to dictated note.    Carlos Mistry, DO
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
## 2019-11-02 NOTE — PLAN OF CARE
Discharge Planner PT   Patient plan for discharge: None stated.   Current status: Orders received and chart reviewed. Per chart, pt requires christiana lift for transfers to/from wheelchair. No IP PT needs indicated at this time.  Barriers to return to prior living situation: None indicated.  Recommendations for discharge: Return home with spouse.  Rationale for recommendations: Pt requires mechanical lift at baseline, no IP PT needs indicated.        Entered by: Tali Cee 11/02/2019 2:56 PM

## 2019-11-02 NOTE — PROGRESS NOTES
" INFECTIOUS DISEASE Progress Note  November 2, 2019  [id number]  Gem Jade    ANTIBIOTICS:  Cefazolin 2 g iv q 8 hours    SUBJECTIVE:  No back pain, no joint pain, loose stools, says always gets diarrhea with any antibiotics, says she was not responsive on admit, remembers John Olmedo/[place], breathing shaky, no N or V; c/o LUE iv site or blood  Draw pain    OBJECTIVE:  BP (!) 146/64   Pulse 74   Temp 98.8  F (37.1  C) (Oral)   Resp 18   Ht 1.753 m (5' 9\")   Wt 103 kg (227 lb 1.2 oz)   SpO2 98%   BMI 33.53 kg/m    Alert, O x 3  Seems cognitively impaired, very slow thought processes  Lung CTA  RRR, no murmur  Abd soft, NT,. ND, obese  No joint effusions  L arm ok, iv site fine    LAB Data:  CREAT 2.2    MICROBIOLOGY:   BC 10/31 1/2 MSSA  BC 11/1 x 2 NG  BC 11/2 x 1 NG    IMAGING:      Attestation:  I have reviewed today's vital signs, notes, medications, labs and imaging.    ASSESSMENT:  1. S.AUREUS BACTEREMIA, MSSA-F/U BC so far negative, ? Source, only 1 bottle + so endocarditis not likely; no sign complications  2. COGNITION-seems diminished, ? Chronic  3. RENAL TRANSPLANT  4. CKD      RECOMMENDATION  1. Cont cefazolin--Change to 2 g iv q 12 hours  2. F/u serial BC  3. Follow for source, other complications    CELESTE MAYES M.D.  O:[phone]   B:[phone]          "

## 2019-11-02 NOTE — PROGRESS NOTES
[facility]  Hospitalist Progress Note   11/02/2019          Assessment and Plan:       Gem Jade is a 73 year old female with history of recurrent cathter associated UTI (last admitted in 8/2019), CKD with hx of renal transplant, type II DM, HTN, hyperlipidemia, bedbound admitted due to altered mental status and UTI     Acute encephalopathy likely multifactorial. Refusing cares. Intermittently confused this am.   Patient having recurrent encephalopathy in the setting of UTIs.  Prior to admission on Neurontin and baclofen.  Multiple admissions since the beginning of this year, concern if she has any cognitive impairment at baseline.    CT head no acute pathology.  Bump in creatinine noted.  Liver function test within normal limit.  TSH normal in July 2019.  Vitamin B12 normal in June 2019.  Noted UTI, treating as below.  Keep holding PTA Neurontin and baclofen, tramadol.  Continue neurochecks, Mental status closely.  PT, OT, speech therapy requested.  Consult psych this am.   We will need cognitive eval as outpatient.    Catheter associated UTI recurrent.  Gram-positive bacteremia.  Diarrhea rule out C. difficile infection.  Afebrile on admission.  W BC within normal limits.UA +nitrites, LE, >180 WBC. Lactic acid is normal  Blood cultures with MSSA  - Continue IV cefazolin.  - Monitor WBC count, fever curve.  - Follow blood cultures.  - ID on board.     Acute kidney injury  Hx of renal transplant and CKD-III-IV, most recent creatinine range 2.2-2.4.  Cr is 2.27 this am (down from 2.50 yesterday).  Renal transplant in 1999. Living donor.   Contiue hydration with normal saline @100cc/hr   Monitor urine output, monitor renal function a.m.  Continue with PTA Prednisone and Tacrolimus  Avoid NSAIDs/nephrotoxins  Nephrology on board (appreciate assistance.     Type II DM with diabetic retinopathy, neuropathy, gastroparesis   Hemoglobin 6.8 [8/2019)  Lantus at 20 units at bedtime  aspart at lower dose  "of 5 units with meals.  [PTA aspart 10 units]  Low intensity sliding scale insulin.  Monitor blood sugars closely.     HTN  Hyperlipidemia:  Continue PTA amlodipine, labetalol.  Continue PTA aspirin, pravastatin.  Continue PTA prazosin.     Hypothyroidism:  Continue PTA levothyroxine    GERD.  Continue PTA famotidine    History of diabetic neuropathy and back pain.    Continue to hold PTA Neurontin and baclofen as above    History of provoked bilateral PE:   Not on anticoagulation. Follow with PCP    History of paroxysmal atrial fibrillation.  Not on anticoagulation prior to admission.    Obstructive sleep apnea on CPAP:   CPAP as per home settings    History of seizure disorder.  On review of chart prior to admission not on seizure medication.    Obesity with a BMI of 33.53.  We will need to consider lifestyle modification with diet and exercise as able to.    Physical deconditioning from acute illness, chronic debility.  PT/OT.     Orders Placed This Encounter      Combination Diet Dysphagia Diet Level 1: Pureed; Thin Liquids (water, ice chips, juice, milk gelatin, ice cream, etc)      DVT Prophylaxis: SCDs.  Start on subcutaneous heparin given history of PE, monitor platelets a.m.  Code Status: Full Code  Disposition: per clinical course (2 days). Psych to see today.      Arnol Frazier MD        Interval History:      Intermittently confused. Denies pain. Asking to be transferred to the [facility] stating she does not like [facility] (\"I kept telling the ambulance to take me to the [facility]\"). Hostile to staff.         Physical Exam:        Physical Exam   Temp:  [98  F (36.7  C)-99.3  F (37.4  C)] 98  F (36.7  C)  Heart Rate:  [78-85] 85  Resp:  [16] 16  BP: (148-150)/(42-70) 149/42  SpO2:  [96 %-100 %] 96 %    Intake/Output Summary (Last 24 hours) at 11/1/2019 1614  Last data filed at 11/1/2019 1412  Gross per 24 hour   Intake --   Output 2450 ml   Net -2450 ml       Admission Weight: 103.9 kg (229 lb)  Current Weight: 103 kg (227 " lb 1.2 oz)    PHYSICAL EXAM  Refuses exam today.       Medications:          amLODIPine  10 mg Oral Daily     aspirin  81 mg Oral QPM     ceFAZolin  2 g Intravenous Q8H     famotidine  20 mg Oral At Bedtime     insulin aspart  5 Units Subcutaneous TID w/meals     insulin aspart  1-7 Units Subcutaneous TID AC     insulin aspart  1-5 Units Subcutaneous At Bedtime     insulin glargine  20 Units Subcutaneous At Bedtime     labetalol  150 mg Oral BID     levothyroxine  50 mcg Oral Daily     pravastatin  10 mg Oral QPM     prazosin  2 mg Oral At Bedtime     predniSONE  5 mg Oral Daily     sodium chloride (PF)  3 mL Intracatheter Q8H     tacrolimus  2 mg Oral BID     acetaminophen, glucose **OR** dextrose **OR** glucagon, lidocaine 4%, lidocaine (buffered or not buffered), melatonin, naloxone, ondansetron **OR** ondansetron, polyethylene glycol, senna-docusate **OR** senna-docusate, sodium chloride (PF)         Data:      All new lab and imaging data was reviewed.

## 2019-11-02 NOTE — PLAN OF CARE
"SLP: session attempted with RN approval (notes from this AM reviewed) - pt currently refusing all offered and stating \"no, I can see your mask, I can see your mask.\" Will continue to follow per POC.   "

## 2019-11-02 NOTE — PLAN OF CARE
OT: orders rec'd, chart reviewed. Per RN, pt very aggitated and not appropriate for OT eval at the time.   OT: Attempted to see pt per OK with nurse, pt very confused, declining to work with OT and stating she just wants to go home or to the .

## 2019-11-02 NOTE — CONSULTS
"Consult Date:  11/02/2019      REASON FOR CONSULTATION:  Recurrent urinary tract infection with delirium.      REQUESTING PHYSICIAN:  Socrates Baptiste MD.      CHIEF COMPLAINT:  \"Lately I've been less confused to no confusion.\"      HISTORY OF PRESENT ILLNESS:  Gem Jade is a 73-year-old female with a history of recurrent catheter associated UTI with associated delirium, chronic kidney disease with history of renal transplant 20 years ago, type 2 diabetes, hypertension, hyperlipidemia that was admitted to the hospital with recurrent encephalopathy in the context of recurring UTIs.  The patient has had multiple hospitalizations this past year with a similar pattern of getting a UTI which is met with altered mental status, primarily in the domains of paranoia and refusals of care.      Since being in the hospital, the patient has been intermittently confused with variable refusing of cares to include medications and labs.  There was query into her cognitive status at baseline with recommendations to get a cognitive evaluation as an outpatient.  Our service was consulted.  Of note, the patient has held prior to admission medications of Neurontin and baclofen for the potential to worsen delirium.  She was on tramadol historically, but that medication has been held per her  in a longer standing fashion for similar indications.      On my interview with the patient, she was initially rather lucid and pleasant, though as the conversation continued she started escalating in paranoid dialogue.  She began speaking of being lied to about coming to this hospital and not going to [facility] as she was told, then speaks of nurses doing things against her will and having ill intent and tells me that they are wearing different wigs.  She then asked me to touch her CPAP machine, stating that there are problems with that.  She was not calm or aggressive, but an irritable edge began emerging.  We talked of " "medications to assist with this as she does have clear recognition of delirious episodes in the past, but she does not demonstrate insight into her current situation, feeling as though everything she is endorsing his accurate and that the care team is not acting in her best interest.  I spoke to her about medications to help mitigate some of this and she is refusing those, though I did inform her that medications could be administered involuntarily if behaviors get uncontrolled such that staff is concerned for her safety or their own safety.      I was able to speak with her , Chang, by telephone through the number listed in the EMR.  He tells me that this is a known pattern and agrees that the past year she will get delirious during her hospitalizations subsequent to a UTI and he notes that the antibiotics can exacerbate this as well.  The typical pattern is delirium persisting for about a week and then independently resolving.  He notes that her baseline is \"chatty Vanessa\" and denies any paranoia.  He had no concerns with her cognitive fitness when doing well medically.  The patient independently told me that her intervals of confusion after hospitalizations seem to be shorter in duration and her  does agree to this.  Some potential explanation for this has been reduction in deliriogenic medications.  She was on higher doses of Neurontin (900 mg) which has been reduced as well as reduction of baclofen.  Tramadol was discontinued.  I informed him during this hospitalization that Neurontin and baclofen are being held as well given their potential to worsen this and recommended that likely resuming these would not be in her best interest going forward.  I talked of periods where she does not have capacity to make medical decisions and he is agreeable to making decisions as her surrogate.  He also is aware, as I discussed with him, of the balance between maintaining good medical care and being careful in " the imposition of forced treatments because she does have some sense of what is going on, it is just the paranoid theme which is interfering with her participation.  He was agreeable with this.      PAST PSYCHIATRIC HISTORY:  As per HPI.      PAST CHEMICAL DEPENDENCY HISTORY:  As per HPI.      PAST MEDICAL HISTORY:  As per HPI.      FAMILY HISTORY:  Reviewed in EMR.      SOCIAL HISTORY:  Reviewed in EMR.      REVIEW OF SYSTEMS:  Ten-point review of systems completed and negative other than noted in HPI.      ALLERGIES:  ATORVASTATIN, CODEINE, DARVOCET, HYDROMORPHONE, LEVOFLOXACIN, MORPHINE, NIASPAN, PERCOCET, VICODIN.      PRIOR TO ADMISSION MEDICATIONS:  Acetaminophen, amlodipine, aspirin, baclofen 10 mg b.i.d. for bladder spasms, cranberry, cyanocobalamin, Lomotil, famotidine, gabapentin 300 mg b.i.d. p.r.n. and 100 mg p.r.n. severe pain, gentamicin, insulin, labetalol, lactobacillus, levothyroxine, lidocaine, loratadine, Ceftin, ondansetron, pravastatin, Prazosin 2 mg at bedtime, prednisone, simethicone, Tacrolimus, tramadol, vitamin D3.      MENTAL STATUS EXAMINATION:  Age appearing female with situationally appropriate grooming and hygiene.  Initially calm, pleasant and cooperative with good eye contact and no abnormalities appreciated, though as the conversation continued a paranoid theme became clear.  She had fears of nursing not acting in her best interest and concerns they are going to be forcing medications on her.  I saw no evidence of response to internal stimuli, though she does convey some periods of hallucinosis, though refuses to explain in further detail.  She denies any of that occurring at present and I saw no evidence of it.  Cognition seems to vacillate with the delirium.  Insight and judgment are limited.  Denies suicidal or homicidal ideation, intent, or plan.      VITAL SIGNS:  Temperature not recorded, pulse 78, respirations 18, blood pressure 168/71, oxygen saturation 97%.      DIAGNOSES:    1.  Delirium.   2.  Recurrent UTI.   3.  Chronic kidney disease with history of renal transplant.   4.  Type 2 diabetes.      IMPRESSION:  Gem Jade is a 73-year-old female with a complicated medical history to include recurrent UTIs over the past year in the context of renal transplant 20 years ago.  Consistent with past observations, when she gets a urinary tract infection she begins demonstrating altered mental status in a fluctuant fashion comprised most notably of paranoia.  These episodes tend to be self-limiting and typically resolve about a week after treatment, though her  notes that this interval has been improving in a positive direction with shorter intervals of improvement.  It is certainly possible that reduction of deliriogenic medications which she has taken prior to admission has helped with mitigation of the length of delirium.  I agree that these medications should be held at present.  I would defer to the nephrologist or other relevant specialists about management of her medical conditions with muscle relaxants and if it is necessary it may be wise going forward to preemptively hold these medications when urinary tract infections reemerge if she must maintain on them during intervals of doing well.  For now the challenging situation is while she does have paranoia in forming a lot of her refusals, she also retains a good sense about the nature of her hospitalization and her medical history.  My concern with imposing medical cares on her at present would be further escalation and worsening of matters.  Her  is available to step in as a surrogate decision maker should he be needed.  For now we will make available Seroquel in as-needed fashion.  She is not agreeable to initiating any medications.  If there are interventions deemed necessary for her safe cares and she is refusing, I would deem her incapable of that refusal and having her  step in for surrogate decisions.       PLAN:   1.   will step in as surrogate decision maker.   2.  For now we will make available Seroquel in a p.r.n. fashion, though has been refusing meds so can only be administered against her will should dyscontrol emerge, which has not been the case as of yet.   3.  Begin Seroquel 50 mg t.i.d. p.r.n. paranoia/anxiety (during periods where this is observed can offer her these medications, informing her that they are there to help rather than hurt, though at present that probably will have low utility).   4.  Reconsult Psychiatry.         KIMBERLY VÁSQUEZ DO             D: 2019   T: 2019   MT: DARIAN      Name:     MARYJANE SHAVER   MRN:      [id number]        Account:       [id number]   :      1945           Consult Date:  2019      Document: [id number]

## 2019-11-02 NOTE — PROVIDER NOTIFICATION
MD Notification    Notified Person: MD Frazier    Notification Date/Time: 11/2/19 0759    Notification Interaction: Telephone, In person    Purpose of Notification: Pt refusing cares, not trusting staff. Called 911    Orders Received: Let patient settle down for a while, then try to give meds and do cares.    Comments:

## 2019-11-03 NOTE — PLAN OF CARE
Discharge Planner OT   Patient plan for discharge: Return home    Current status: Pt admitted for encephalopathy due to recurrent UTI. At baseline, pt lives in house with  and uses [facility] lift for transfers. Pt has A for ADLs at home.     Barriers to return to prior living situation: None anticipated    Recommendations for discharge: Return home with continued A from spouse    Rationale for recommendations: Pt is available to provide continued A at home.        Entered by: Geri Lara 11/03/2019 3:24 PM

## 2019-11-03 NOTE — PLAN OF CARE
DATE & TIME: 11/03/19 9046-0087   Cognitive Concerns/ Orientation : A&O x3, disoriented to time   BEHAVIOR & AGGRESSION TOOL COLOR: Green, irritable/ refusing cares at times  CIWA SCORE: NA   ABNL VS/O2: VSS on room air   MOBILITY: Assist of 2/ lift; turn/repo q 2 hrs  PAIN MANAGMENT: Denies pain  DIET: DD1 pureed, thin liquids  BOWEL/BLADDER: Johnson, no bm this shift  ABNL LAB/BG:   DRAIN/DEVICES: Johnson intact and patent; PIV R arm saline locked  TELEMETRY RHYTHM: NSR  SKIN: Mepilex dressing on sacrum, CDI . Redness to brandie area.   TESTS/PROCEDURES: None  D/C DAY/GOALS/PLACE: Discharge pending  OTHER IMPORTANT INFO: intermittently irritable/ paranoid.  Reassurance provided. Contact/enteric isolation maintained. Continues on IV abx. PT/OT/Speech/ID/Neph following

## 2019-11-03 NOTE — PLAN OF CARE
Refused night time insulin. Refused most neuro checks. Refused supper. Alex. Tele NSR. Mepilex on coccyx for open sore. Turn and reposition q2h. Had a large BM.

## 2019-11-03 NOTE — PLAN OF CARE
Discharge Planner SLP   Patient plan for discharge: home  Current status: Swallow tx completed. Seen with 6 oz thin liquids via straw, 4 oz puree - tolerated without s/s aspiration, pt denies difficulty. She pleasantly declines further chewable solids for advancements currently, stating that she does not want to eat at the hospital - feels that people will put things in her food. Unable to upgrade diet d/t same. Recommend: Continue dysphagia diet level 1 and thin liquids (straws ok).  Barriers to return to prior living situation: None from SLP  Recommendations for discharge: Home with  SLP  Rationale for recommendations: Pt below baseline and would benefit from short term SLP services to advance diet to baseline as indicated       Entered by: Amanda Alvarez 11/03/2019 12:10 PM

## 2019-11-03 NOTE — PLAN OF CARE
DATE & TIME: 11/03/19 2885-8647          Cognitive Concerns/ Orientation : A&Ox3, disoriented to time   BEHAVIOR & AGGRESSION TOOL COLOR: Green  CIWA SCORE: NA    ABNL VS/O2: VSS on RA, elevated /63   MOBILITY: Assist of 2/ lift; turn/repo q 2 hrs  PAIN MANAGMENT: Denies  DIET: DD1 pureed, thin liquids  BOWEL/BLADDER: [employer] of stool  ABNL LAB/BG: Hgb 10.3 Creat 2.34 , 204  DRAIN/DEVICES: Johnson intact and patent; PIV SL  TELEMETRY RHYTHM: NSR  SKIN: Mepilex dressing on sacrum, CDI . Redness to brandie area.   TESTS/PROCEDURES: None  D/C DAY/GOALS/PLACE: Pending  OTHER IMPORTANT INFO: Contact/enteric isolation maintained. Continues on IV abx. PT/OT/Speech/ID/Neph following

## 2019-11-03 NOTE — PROGRESS NOTES
[facility]  Hospitalist Progress Note   11/03/2019          Assessment and Plan:       Gem Jade is a 73 year old female with history of recurrent cathter associated UTI (last admitted in 8/2019), CKD with hx of renal transplant, type II DM, HTN, hyperlipidemia, bedbound admitted due to altered mental status and UTI     Acute encephalopathy likely multifactorial. Intermittent confusion but ok this morning.   Patient having recurrent encephalopathy in the setting of UTIs.  Prior to admission on Neurontin and baclofen.  Multiple admissions since the beginning of this year, concern if she has any cognitive impairment at baseline.    CT head no acute pathology.  Bump in creatinine noted.  Liver function test within normal limit.  TSH normal in July 2019.  Vitamin B12 normal in June 2019. Seen by psych on 11/2/19.   Treat UTI  Prn seroquel  Keep holding PTA Neurontin and baclofen, tramadol.  Continue neurochecks, Mental status closely.  PT, OT, speech therapy requested.  Follow psych recs (prn seroquel for now).    Catheter associated UTI recurrent.  Gram-positive bacteremia.  Diarrhea rule out C. difficile infection.  Afebrile on admission.  W BC within normal limits.UA +nitrites, LE, >180 WBC. Lactic acid is normal  Blood cultures with MSSA  - Continue IV cefazolin.  - Monitor WBC count, fever curve.  - Follow blood cultures.  - ID on board.     Acute kidney injury  Hx of renal transplant and CKD-III-IV, most recent creatinine range 2.2-2.4.  Cr is 2.34 this am, stable.  Renal transplant in 1999. Living donor.   Contiue hydration.  Monitor urine output, monitor renal function a.m.  Continue with PTA Prednisone and Tacrolimus  Avoid NSAIDs/nephrotoxins  Nephrology on board (appreciate assistance).     Type II DM with diabetic retinopathy, neuropathy, gastroparesis   Hemoglobin 6.8 [8/2019)  Lantus at 20 units at bedtime  aspart at lower dose of 5 units with meals.  [PTA aspart 10 units]  Low  intensity sliding scale insulin.  Monitor blood sugars closely.     HTN  Hyperlipidemia:  Continue PTA amlodipine, labetalol.  Continue PTA aspirin, pravastatin.  Continue PTA prazosin.     Hypothyroidism:  Continue PTA levothyroxine    GERD.  Continue PTA famotidine    History of diabetic neuropathy and back pain.    Continue to hold PTA Neurontin and baclofen as above    History of provoked bilateral PE:   Not on anticoagulation. Follow with PCP    History of paroxysmal atrial fibrillation.  Not on anticoagulation prior to admission.    Obstructive sleep apnea on CPAP:   CPAP as per home settings    History of seizure disorder.  On review of chart prior to admission not on seizure medication.    Obesity with a BMI of 33.53.  We will need to consider lifestyle modification with diet and exercise as able to.    Physical deconditioning from acute illness, chronic debility.  PT/OT.     Orders Placed This Encounter      Combination Diet Dysphagia Diet Level 1: Pureed; Thin Liquids (water, ice chips, juice, milk gelatin, ice cream, etc)      DVT Prophylaxis: SCDs.  Start on subcutaneous heparin given history of PE, monitor platelets a.m.  Code Status: Full Code  Disposition: per clinical course (2 days). Clearance from ID/psych. Decision maker being arrange (see psych note).      Arnol Frazier MD        Interval History:      Calmer and more cooperative this am. Denies abd pain/n/v. No chest/sob. Oriented x3.         Physical Exam:        Physical Exam   Temp:  [98.4  F (36.9  C)-98.8  F (37.1  C)] 98.7  F (37.1  C)  Pulse:  [74-78] 75  Heart Rate:  [73-78] 75  Resp:  [18] 18  BP: (146-168)/(63-98) 151/63  SpO2:  [97 %-98 %] 98 %    Intake/Output Summary (Last 24 hours) at 11/1/2019 1614  Last data filed at 11/1/2019 1412  Gross per 24 hour   Intake --   Output 2450 ml   Net -2450 ml       Admission Weight: 103.9 kg (229 lb)  Current Weight: 103 kg (227 lb 1.2 oz)  HEART: Regular rate and rhythm. S1S2. No  murmurs  LUNGS: Bilateral decreased breath sounds, no wheezing no crackles.  ABDOMEN: Soft, no abdominal tenderness, bowel sounds heard   No suprapubic tenderness, no costovertebral angle tenderness.  NEURO: Moving all extremities.    EXTREMITIES: pedal edema. 2+ peripheral pulses.  SKIN: Warm, dry. No rash or bruising.  PSYCHIATRY Cooperative         Medications:          amLODIPine  10 mg Oral Daily     aspirin  81 mg Oral QPM     ceFAZolin  2 g Intravenous Q12H     famotidine  20 mg Oral At Bedtime     insulin aspart  5 Units Subcutaneous TID w/meals     insulin aspart  1-7 Units Subcutaneous TID AC     insulin aspart  1-5 Units Subcutaneous At Bedtime     insulin glargine  20 Units Subcutaneous At Bedtime     labetalol  150 mg Oral BID     levothyroxine  50 mcg Oral Daily     pravastatin  10 mg Oral QPM     prazosin  2 mg Oral At Bedtime     predniSONE  5 mg Oral Daily     sodium chloride (PF)  3 mL Intracatheter Q8H     tacrolimus  2 mg Oral BID     acetaminophen, glucose **OR** dextrose **OR** glucagon, lidocaine 4%, lidocaine (buffered or not buffered), melatonin, naloxone, ondansetron **OR** ondansetron, polyethylene glycol, QUEtiapine, senna-docusate **OR** senna-docusate, sodium chloride (PF)         Data:      All new lab and imaging data was reviewed.

## 2019-11-03 NOTE — PROGRESS NOTES
[facility]     Renal Progress Note       SHORTHAND KEY FOR MY NOTES:  c = with, s = without, p = after, a = before, x = except, asx = asymptomatic, tx = transplant or treatment, sx = symptoms or symptomatic, cx = canceled or culture, rxn = reaction, yday = yesterday, nl = normal, abx = antibiotics, fxn = function, dx = diagnosis, dz = disease, m/h = melena/hematochezia, c/d/l/ha = cramping/dizziness/lightheadedness/headache, d/c = discharge or diarrhea/constipation, f/c/n/v = fevers/chills/nausea/vomiting, cp/sob = chest pain/shortness of breath, tbv = total body volume, rxn = reaction, tdc = tunneled dialysis catheter, pta = prior to admission, hd = hemodialysis, pd = peritoneal dialysis, hhd = home hemodialysis         Assessment/Plan:     1.  ROB/CKD IV.  Pt's cr is back to baseline.  She is non-oliguric.  Chemistries are ok.  A.  Follow labs, uo, sx.    2.  ESKD s/p LRRT.  Pt is taking tac / pred and is doing fine.  She follows c Dr. Elizabeth at the  for her kidney tx.  A.  Continue same doses of tac / pred.    3.  MSSA bacteremia / UTI.  Pt is on cefazolin and she is responding well clinically.  A.  Continue abx at proper renal dose.  B.  Follow clinically.    4.  HTN.  Pt's BP is high for having a tx.  She is not properly beta blocked, yet.  A.  Increase labet to 300 bid.  B.  Follow BPs.    5.  RTA.  Pt's bicarb is dropping steadily.  K is ok.  She is not having loose stools.    A.  Start NaHCO3 650 every day.    6.  FEN.  Electrolytes are ok.  On a Dys I diet.  A.  Continue same diet.        Interval History:     Pt feels much better today.  She is a lot less confused and is almost back to nl, per her .  No f/c/n/v/itching.  Breathing ok, no cp.  Good uo via rico.  No diarrhea.          Medications and Allergies:       amLODIPine  10 mg Oral Daily     aspirin  81 mg Oral QPM     ceFAZolin  2 g Intravenous Q12H     famotidine  20 mg Oral At Bedtime     insulin aspart  5 Units  "Subcutaneous TID w/meals     insulin aspart  1-7 Units Subcutaneous TID AC     insulin aspart  1-5 Units Subcutaneous At Bedtime     insulin glargine  20 Units Subcutaneous At Bedtime     labetalol  150 mg Oral BID     levothyroxine  50 mcg Oral Daily     pravastatin  10 mg Oral QPM     prazosin  2 mg Oral At Bedtime     predniSONE  5 mg Oral Daily     sodium chloride (PF)  3 mL Intracatheter Q8H     tacrolimus  2 mg Oral BID     Allergies   Allergen Reactions     Atorvastatin Calcium      myalgias, muscle aches     Codeine Nausea and Vomiting     Darvocet [Propoxyphene N-Apap] Nausea and Vomiting     Hydromorphone      Levofloxacin Other (See Comments) and Diarrhea     hallucinations     Morphine      Nausea and vomiting     Niaspan [Niacin] GI Disturbance     Flushing even at low dose, GI upset     Percocet [Oxycodone-Acetaminophen]      Vomiting after taking med couple of times     Vicodin [Hydrocodone-Acetaminophen] Nausea and Vomiting          Physical Exam:     Vitals were reviewed    Heart Rate: 75, Blood pressure (!) 151/63, pulse 75, temperature 98.7  F (37.1  C), temperature source Oral, resp. rate 18, height 1.753 m (5' 9\"), weight 103 kg (227 lb 1.2 oz), SpO2 98 %, not currently breastfeeding.  Wt Readings from Last 3 Encounters:   11/01/19 103 kg (227 lb 1.2 oz)   08/28/19 103.9 kg (229 lb)   07/21/19 107.9 kg (237 lb 14 oz)     Intake/Output Summary (Last 24 hours) at 11/3/2019 1507  Last data filed at 11/3/2019 1429  Gross per 24 hour   Intake 240 ml   Output 750.35 ml   Net -510.35 ml     GENERAL APPEARANCE: pleasant, NAD, alert  HEENT:  eyes/ears/nose/neck grossly normal  RESP: lungs cta b c good efforts, no crackles  CV: RRR c 2/6 m, nl S1/S2  ABDOMEN: o/s/nt/nd  EXTREMITIES/SKIN: tr ble edema  OTHER:  + rico c clear urine         Data:     CBC RESULTS:     Recent Labs   Lab 11/03/19  0548 11/02/19  0552 11/01/19  0920 10/31/19  1404   WBC 6.8 7.0 6.9 8.1   RBC 3.64* 3.63* 3.65* 3.69*   HGB 10.3* " 10.4* 10.4* 10.7*   HCT 33.2* 33.2* 33.7* 33.8*   * 132* 133* 175     Basic Metabolic Panel:  Recent Labs   Lab 11/03/19  0548 11/02/19  0552 11/01/19  0920 10/31/19  1404    139 140 139   POTASSIUM 4.4 4.3 4.1 4.6   CHLORIDE 114* 112* 114* 112*   CO2 16* 18* 19* 22   BUN 44* 48* 47* 56*   CR 2.34* 2.27* 2.50* 2.69*   * 197* 114* 152*   ROGER 8.3* 8.6 8.4* 8.6     INRNo lab results found in last 7 days.   Attestation:   I have reviewed today's relevant vital signs, notes, medications, labs and imaging.    Robson Seymour MD  [facility] - Nephrology  [phone]

## 2019-11-04 NOTE — TELEPHONE ENCOUNTER
Home Health Certification and Plan of Care faxed to [facility] at [phone].  Cecilia Hernandez MA

## 2019-11-04 NOTE — PROGRESS NOTES
[facility]  WO Nurse Inpatient Wound Assessment   Reason for consultation: Evaluate and treat: suspected PI buttocks     Assessment  Coccxy/Sacrum and bilateral buttocks: skin is basically intact. Scarring noted. Pt has blanchable erythema @ base of coccyx crease with a few areas of scattered denudement.   Bilateral IT's: skin intact with some superficial peeling noted. Area of blanchable venous congestion.     Treatment Plan  Wound care: Coccyx /Sacrum and bilateral IT's for prevention  1. Change dressing on even days and prn for soiling  2.  PIP Measures: Rt/Lt turning only, HOB below 30 degrees, Mobilize to chair when medically indicated;         Heel suspension on pillows in bed; Incontinence protocol:  Johnson for UIC / barrier cream for FIC         At home pt is transferred with a lift; Albino risk: document interventions.       Orders : Epic  WOC Nurse follow-up plan: weekly if pt remains in hospital  Nursing to notify the Provider(s) and re-consult the WO Nurse if wound(s) deteriorates or new skin concern.    Patient History  According to provider note(s):    Gem Jade is a 73-year-old female with a history of recurrent catheter associated UTI with associated delirium, chronic kidney disease with history of renal transplant 20 years ago, type 2 diabetes, hypertension, hyperlipidemia that was admitted to the hospital with recurrent encephalopathy in the context of recurring UTIs.  The patient has had multiple hospitalizations this past year with a similar pattern of getting a UTI which is met with altered mental status, primarily in the domains of paranoia and refusals of care.        Objective Data  Containment of urine/stool: chronic indwelling catheter and incontinence protocol for FIC    Active Diet Order:  Orders Placed This Encounter      Combination Diet Dysphagia Diet Level 3: Advanced; Thin Liquids (water, ice chips, juice, milk gelatin, ice cream, etc)    Output:   I/O last 3  completed shifts:  In: 123 [P.O.:120; I.V.:3]  Out: 250 [Urine:250]    Risk Assessment:   Sensory Perception: 3-->slightly limited  Moisture: 3-->occasionally moist  Activity: 1-->bedfast  Mobility: 2-->very limited  Nutrition: 2-->probably inadequate  Friction and Shear: 2-->potential problem  Albino Score: 13                          Labs:   Recent Labs   Lab 11/04/19  0851  11/02/19  0552 11/01/19  0920   ALBUMIN  --   --  3.0*  --    HGB 9.9*   < > 10.4* 10.4*   WBC 7.1   < > 7.0 6.9   A1C  --   --   --  5.8*   CRP  --   --   --  <2.9    < > = values in this interval not displayed.       Physical Exam  Skin inspection: No wound appreciated. Skin is basically intact with moderate scarring noted. Blanchable erythema @ base of coccyx crease. Buttocks with a few scattered areas of denudement.   Bilateral IT's with skin intact, some peeling and blanchable venous congestion noted. Pt has chronic indwelling catheter with areas in IT's in creases and venous congestion r/t lift and chair dependence.       Interventions  Current support surface: atmos air  Current off-loading measures: pillows and heel suspension. Pt turned well with assist x 1  Visual inspection/assessement of skin completed  Wound Care:completed  Supplies: In pt room and floor stock  Education provided:Patient re: PIP measures       Renay Jeffries RN [facility]

## 2019-11-04 NOTE — PROVIDER NOTIFICATION
MD Notification    Notified Person: MD    Notified Person Name:  Karin    Notification Date/Time: 11/3/19 7320    Notification Interaction: Spoke with MD via telephone    Purpose of Notification: Pt requesting to eat more solid food, had declined trying solids with Speech during therapy.  Spoke with Amanda, speech therapist, who stated pt could advance to DD2 diet if ok with MD.    Orders Received: Telephone order to advance pt diet to DD2.    Comments:

## 2019-11-04 NOTE — PLAN OF CARE
A&Ox4, intermittent periods of paranoia and seeing things that aren't there.  VSS.  Denied pain.  Tolerated DD2 with thins.  Turned q2h.  Alex patent.  Tele NSR.  IV antibiotic.   and 143.

## 2019-11-04 NOTE — PLAN OF CARE
DATE & TIME: 11/4 Day                    Cognitive Concerns/ Orientation : A&Ox4, forgetful. Paranoia at times w/ hallucinations, redirectable.   BEHAVIOR & AGGRESSION TOOL COLOR: Green  CIWA SCORE: n/a  ABNL VS/O2: VSS on RA, /61  MOBILITY: Turn and repo Q2, lift assist  PAIN MANAGMENT: Denies  DIET: DD2, poor appetite.  BOWEL/BLADDER: Chronic Johnson in place, no BM this shift.  ABNL LAB/BG: BG 77, 109  DRAIN/DEVICES: PIV SL, started on IV Zosyn today; Johnson  TELEMETRY RHYTHM: NSR  SKIN: Foam on coccyx for prevention.  TESTS/PROCEDURES: n/a  D/C DAY/GOALS/PLACE: Pending   OTHER IMPORTANT INFO: Contact precautions maintained, enteric precautions discontinued. Nephrology and ID following.

## 2019-11-04 NOTE — PROGRESS NOTES
[facility]    Infectious Disease Progress Note    Date of Service (when I saw the patient): 11/04/2019     Assessment & Plan   Gem Jade is a 73 year old female who was admitted on 10/31/2019.     ASSESSMENT:  1. S.AUREUS BACTEREMIA, MSSA-F/U BC so far negative, ? Source, only 1 bottle + so endocarditis not likely; no sign complications  2. COGNITION-seems diminished, ? Chronic  3. RENAL TRANSPLANT  4. CKD  5. Citrobacter in the urine.         RECOMMENDATION  1. Switch to zosyn which still provides reliable coverage for the MSSA bacteremia at the same time covers Citrobacter in the urine   2. Will need 4 weeks treatment of the MSSA bacteremia, zosyn for 5-7 days then switch to ancef back again as hard to make of constantly positive UA and Bacteruria in this patient. But at the same time renal transplant patient so hard to ignore the bacteruria.       Paramjit Mckenzie MD    Interval History   Afebrile   Mental status the same     Physical Exam   Temp: 98.1  F (36.7  C) Temp src: Oral BP: (!) 151/61   Heart Rate: 68 Resp: 18 SpO2: 98 % O2 Device: None (Room air)    Vitals:    10/31/19 1306 11/01/19 0500   Weight: 103.9 kg (229 lb) 103 kg (227 lb 1.2 oz)     Vital Signs with Ranges  Temp:  [98.1  F (36.7  C)-98.7  F (37.1  C)] 98.1  F (36.7  C)  Heart Rate:  [68-74] 68  Resp:  [18] 18  BP: (136-162)/(56-72) 151/61  SpO2:  [97 %-99 %] 98 %    Constitutional: Awake  Lungs: Clear to auscultation bilaterally, no crackles or wheezing  Cardiovascular: Regular rate and rhythm, normal S1 and S2, and no murmur noted  Abdomen: Normal bowel sounds, soft, non-distended, non-tender  Skin: No rashes, no cyanosis, no edema  Other:    Medications       amLODIPine  10 mg Oral Daily     aspirin  81 mg Oral QPM     ceFAZolin  1 g Intravenous Q12H     famotidine  20 mg Oral At Bedtime     insulin aspart  5 Units Subcutaneous TID w/meals     insulin aspart  1-7 Units Subcutaneous TID AC     insulin aspart  1-5 Units  Subcutaneous At Bedtime     insulin glargine  20 Units Subcutaneous At Bedtime     labetalol  300 mg Oral BID     levothyroxine  50 mcg Oral Daily     pravastatin  10 mg Oral QPM     prazosin  2 mg Oral At Bedtime     predniSONE  5 mg Oral Daily     sodium bicarbonate  650 mg Oral Daily     sodium chloride (PF)  3 mL Intracatheter Q8H     tacrolimus  2 mg Oral BID       Data   All microbiology laboratory data reviewed.  Recent Labs   Lab Test 11/03/19  0548 11/02/19  0552 11/01/19  0920   WBC 6.8 7.0 6.9   HGB 10.3* 10.4* 10.4*   HCT 33.2* 33.2* 33.7*   MCV 91 92 92   * 132* 133*     Recent Labs   Lab Test 11/03/19  0548 11/02/19  0552 11/01/19  0920   CR 2.34* 2.27* 2.50*     Recent Labs   Lab Test 05/25/16  2116   SED 53*     Recent Labs   Lab Test 11/03/19 2008 11/02/19  1521 11/01/19  1549 11/01/19  1540 10/31/19  1404 10/31/19  1310 09/17/19  1230 08/06/19  1343 07/19/19  1455   CULT No growth after 8 hours No growth after 2 days No growth after 3 days No growth after 3 days No growth after 4 days  Cultured on the 2nd day of incubation:  Staphylococcus aureus  *  Critical Value/Significant Value, preliminary result only, called to and read back by   Johana Robison RN 11/1/2019 @1100 HDP      (Note)  POSITIVE for STAPHYLOCOCCUS AUREUS and NEGATIVE for the mecA gene  (not MRSA) by [employer] multiplex nucleic acid test. The mecA gene was  not detected. Final identification and antimicrobial susceptibility  testing will be verified by standard methods.    Specimen tested with [employer] multiplex, gram-positive blood culture  nucleic acid test for the following targets: Staph aureus, Staph  epidermidis, Staph lugdunensis, other Staph species, Enterococcus  faecalis, Enterococcus faecium, Streptococcus species, S. agalactiae,  S. anginosus grp., S. pneumoniae, S. pyogenes, Listeria sp., mecA  (methicillin resistance) and Viridiana/B (vancomycin resistance).    Critical Value/Significant Value called to and read  back by GENE WONG AT 1350 11/1/19    >100,000 colonies/mL  Citrobacter freundii complex  *  >100,000 colonies/mL  Staphylococcus aureus  *  >100,000 colonies/mL  urogenital santos  Susceptibility testing not routinely done   >100,000 colonies/mL  mixed urogenital santos  Susceptibility testing not routinely done   No growth  No growth  >100,000 colonies/mL  Citrobacter freundii complex  *  10,000 to 50,000 colonies/mL  Candida albicans / dubliniensis  Candida albicans and Candida dubliniensis are not routinely speciated  Susceptibility testing not routinely done  * No anaerobes isolated  Light growth  Staphylococcus aureus  *       Attestation:  Total time on the floor involved in the patient's care: 35 minutes. Total time spent in counseling/care coordination: >50%

## 2019-11-04 NOTE — TELEPHONE ENCOUNTER
Home Health Certification and Plan of Care faxed to [facility] at [phone].  Cecilia Hernandez MA

## 2019-11-04 NOTE — PLAN OF CARE
DATE & TIME: 11/04/19 5366-4042    Cognitive Concerns/ Orientation : A&O x3 disoriented to time, anxious at times; Neuro check Q4 hours  BEHAVIOR & AGGRESSION TOOL COLOR: Green  CIWA SCORE: N/A   ABNL VS/O2: VSS, elevated BP; CPAP worn intermittently throughout night.   MOBILITY: Turn Q2, mechanical lift  PAIN MANAGMENT: Denies pain this shift  DIET: DD2 with thin liquids  BOWEL/BLADDER: Johnson in place (chronic)  ABNL LAB/BG:   DRAIN/DEVICES: PIV SL  TELEMETRY RHYTHM: NSR  SKIN: Blanchable redness on coccyx, mepilex in place  TESTS/PROCEDURES: Speech consult ordered  D/C DAY/GOALS/PLACE: Pending progress  OTHER IMPORTANT INFO: Pt. Becomes paranoid and anxious at times, redirectable. Contact/Enteric precautions maintained. IV antibiotics.

## 2019-11-04 NOTE — PROGRESS NOTES
Renal Medicine Progress Note                                Gem Jade MRN# [id number]   Age: 73 year old YOB: 1945   Date of Admission: 10/31/2019 Hospital LOS: 4                  Assessment/Plan:         1.  ROB/CKD IV.     -at or near baseline renal function     2.  ESKD s/p LRRT.    -Pt is taking tac / pred and is doing fine.  She follows c Dr. Elizabeth at the  for her kidney tx.    3.  MSSA bacteremia / UTI.  Pt is on cefazolin and she is responding well clinically     4.  HTN.     -recent increase in labetalol dose     5.  RTA.      -PO HCO3- started      Following          Interval History:     Comfortable in bed  Pleasant  Labs reviewed with patient    No complaints      ROS:     GENERAL: NAD, No fever,chills  E/M: NEGATIVE for ear, mouth and throat problems  R: NEGATIVE for significant cough or SOB  CV: NEGATIVE for chest pain, palpitations  EXT: no change edema  ROS otherwise negative    Medications and Allergies:     Reviewed    Physical Exam:     Vitals were reviewed  Patient Vitals for the past 8 hrs:   BP Temp Temp src Heart Rate Resp SpO2   11/04/19 0724 (!) 151/61 98.1  F (36.7  C) Oral 68 18 98 %     I/O last 3 completed shifts:  In: -   Out: 650 [Urine:650]    Vitals:    10/31/19 1306 11/01/19 0500   Weight: 103.9 kg (229 lb) 103 kg (227 lb 1.2 oz)       GENERAL: awake, alert, follows  HEENT: NC/AT, PERRLA, EOMI, non icteric, pharynx moist without lesion  RESP:  clear anteriorly  CV: RRR, normal S1 S2  ABDOMEN: soft, nontender, no HSM or masses and bowel sounds normal  MS: no clubbing, cyanosis   SKIN: clear without significant rashes or lesions  NEURO: speech normal and cranial nerves 2-12 intact  PSYCH: affect normal/bright  EXT: warm, trace edema    Data:     Recent Labs   Lab 11/04/19  0851 11/03/19  0548 11/02/19  0552 11/01/19  0920    140 139 140   POTASSIUM 3.9 4.4 4.3 4.1   CHLORIDE 111* 114* 112* 114*   CO2 20 16* 18* 19*   ANIONGAP 8 10 9 7   GLC 74 219* 197*  114*   BUN 45* 44* 48* 47*   CR 2.43* 2.34* 2.27* 2.50*   GFRESTIMATED 19* 20* 21* 18*   GFRESTBLACK 22* 23* 24* 21*   ROGER 8.7 8.3* 8.6 8.4*         Recent Labs   Lab Test 11/04/19  0851 11/03/19  0548 11/02/19  0552 11/01/19  0920 10/31/19  1404 10/08/19  1200 09/17/19  1230 09/10/19  1215 08/09/19  0834 08/07/19  0855   CR 2.43* 2.34* 2.27* 2.50* 2.69* 2.43* 2.16* 2.23* 1.85* 2.07*     Recent Labs   Lab 11/02/19  0552 10/31/19  1404   ALBUMIN 3.0* 3.2*     Recent Labs   Lab 11/04/19  0851 11/03/19  0548 11/02/19  0552 11/01/19  0920   PHOS  --   --  4.2  --    HGB 9.9* 10.3* 10.4* 10.4*         G Dimitry Phillips MD    [facility] - Nephrology  [phone]

## 2019-11-04 NOTE — PROGRESS NOTES
[facility]  Hospitalist Progress Note   11/04/2019          Assessment and Plan:       Gem Jade is a 73 year old female with history of recurrent cathter associated UTI (last admitted in 8/2019), CKD with hx of renal transplant, type II DM, HTN, hyperlipidemia, bedbound admitted due to altered mental status and UTI     Acute metabolic encephalopathy secondary to UTI now resolved.   Patient having recurrent encephalopathy in the setting of UTIs.  Prior to admission on Neurontin and baclofen.  Multiple admissions since the beginning of this year, concern if she has any cognitive impairment at baseline.    CT head no acute pathology.  Bump in creatinine noted.  Liver function test within normal limit.  TSH normal in July 2019.  Vitamin B12 normal in June 2019. Seen by psych on 11/2/19.   Treat UTI  Prn seroquel  Keep holding PTA Neurontin and baclofen, tramadol.  Continue neurochecks, Mental status closely.  PT, OT, speech therapy requested.  Follow psych recs (prn seroquel for now).    Overall improve at this time.     Catheter associated UTI recurrent.  MSSA bacteremia   Diarrhea  Now resolved.  Afebrile on admission.  WBC within normal limits.UA +nitrites, LE, >180 WBC. Lactic acid is normal  Blood cultures with MSSA, Urine culture growing Staph aureus and Citrobacter freundii complex  She is on IV Cefazolin, her Citrobacter freundii complex is resistant with Cefazolin, agree with switching  To IV Zosyn by ID at this time for 5 days to treat her UTI and then back to Cefazolin for her MSSA bacteremia.  Her repeat blood cultures are negative so far.   ID following      Chronic Kidney disease s/p Renal transplantation.  Hx of renal transplant and CKD-III-IV, most recent creatinine range 2.2-2.4.  Cr is 2.34 this am, stable.  Renal transplant in 1999. Living donor.   Stop IV fluids now   Monitor urine output, monitor renal function   Continue with PTA Prednisone and Tacrolimus  Avoid  NSAIDs/nephrotoxins  Nephrology on board and following.      Type II DM with diabetic retinopathy, neuropathy, gastroparesis   Hemoglobin 6.8 [8/2019)  Lantus at 20 units at bedtime  aspart at lower dose of 5 units with meals.  [PTA aspart 10 units]  Low intensity sliding scale insulin.  Monitor blood sugars closely.  Diabetes is in good control.      HTN  Hyperlipidemia:  Continue PTA amlodipine, labetalol.  Continue PTA aspirin, pravastatin.  Continue PTA prazosin.     Hypothyroidism:  Continue PTA levothyroxine    GERD.  Continue PTA famotidine    History of diabetic neuropathy and back pain.    Continue to hold PTA Neurontin and baclofen as above    History of provoked bilateral PE:   Not on anticoagulation. Follow with PCP    History of paroxysmal atrial fibrillation.  Not on anticoagulation prior to admission.    Obstructive sleep apnea on CPAP:   CPAP as per home settings    History of seizure disorder.  On review of chart prior to admission not on seizure medication.    Obesity with a BMI of 33.53.  We will need to consider lifestyle modification with diet and exercise as able to.    Physical deconditioning from acute illness, chronic debility.  PT/OT.     Orders Placed This Encounter      Combination Diet Dysphagia Diet Level 2: Mechan Altered; Thin Liquids (water, ice chips, juice, milk gelatin, ice cream, etc)      DVT Prophylaxis: SCDs.  Start on subcutaneous heparin given history of PE, monitor platelets a.m.  Code Status: Full Code  Disposition: per clinical course (2 days). Clearance from ID/psych. Decision maker being arrange (see psych note).      Owen Almonte MD        Interval History:      Feeling much better this morning, denies any chest pain, SOB, fever, chills, nausea, vomiting,  Headache or dizziness.    No other significant event overnight          Physical Exam:        Physical Exam   Temp:  [98.1  F (36.7  C)-98.7  F (37.1  C)] 98.1  F (36.7  C)  Heart Rate:  [68-74] 68  Resp:  [18]  18  BP: (136-162)/(56-72) 151/61  SpO2:  [97 %-99 %] 98 %    Intake/Output Summary (Last 24 hours) at 11/1/2019 1614  Last data filed at 11/1/2019 1412  Gross per 24 hour   Intake --   Output 2450 ml   Net -2450 ml       Admission Weight: 103.9 kg (229 lb)  Current Weight: 103 kg (227 lb 1.2 oz)  HEART: Regular rate and rhythm. S1S2. No murmurs  LUNGS: Bilateral decreased breath sounds, no wheezing no crackles.  ABDOMEN: Soft, no abdominal tenderness, bowel sounds heard   No suprapubic tenderness, no costovertebral angle tenderness.  NEURO: Moving all extremities.    EXTREMITIES: pedal edema. 2+ peripheral pulses.  SKIN: Warm, dry. No rash or bruising.  PSYCHIATRY Cooperative         Medications:          amLODIPine  10 mg Oral Daily     aspirin  81 mg Oral QPM     famotidine  20 mg Oral At Bedtime     insulin aspart  5 Units Subcutaneous TID w/meals     insulin aspart  1-7 Units Subcutaneous TID AC     insulin aspart  1-5 Units Subcutaneous At Bedtime     insulin glargine  20 Units Subcutaneous At Bedtime     labetalol  300 mg Oral BID     levothyroxine  50 mcg Oral Daily     piperacillin-tazobactam  3.375 g Intravenous Q6H     pravastatin  10 mg Oral QPM     prazosin  2 mg Oral At Bedtime     predniSONE  5 mg Oral Daily     sodium bicarbonate  650 mg Oral Daily     sodium chloride (PF)  3 mL Intracatheter Q8H     tacrolimus  2 mg Oral BID     acetaminophen, glucose **OR** dextrose **OR** glucagon, lidocaine 4%, lidocaine (buffered or not buffered), melatonin, naloxone, ondansetron **OR** ondansetron, polyethylene glycol, QUEtiapine, senna-docusate **OR** senna-docusate, sodium chloride (PF)         Data:      All new lab and imaging data was reviewed.

## 2019-11-04 NOTE — PLAN OF CARE
Discharge Planner SLP   Patient plan for discharge: Not addressed.   Current status: Patient seen for swallow treatment bedside with a snack. She was able to tolerate thin liquids via the straw without Sx of aspiration. Pudding texture was WFL. Mastication was sufficient for a solid with good bolus extraction and clearing. No Sx of aspiration during the session.     Recommend: 1. Advance diet to a DDL 3 with thin liquids. 2. Upright, fully alert, small bites/sips and alternate liquids/solids as needed. 3. SLP will follow up at a meal for diet tolerance.     Barriers to return to prior living situation: None  Recommendations for discharge: Previous living situation.   Rationale for recommendations: Do not anticipate further treatment at discharge. Near her baseline.        Entered by: Pily Boyce 11/04/2019 4:59 PM

## 2019-11-04 NOTE — PROGRESS NOTES
SPIRITUAL HEALTH SERVICES Progress Note  [facility]    Initiated visit due to LOS.  Pt was in room alone.  Pt declined visit from  at this time.  SH will remain available for follow-up per pt request.      Quinn Samuel  Chaplain Resident

## 2019-11-05 NOTE — PROGRESS NOTES
[facility]    Infectious Disease Progress Note    Date of Service (when I saw the patient): 11/05/2019     Assessment & Plan   Gem Jade is a 73 year old female who was admitted on 10/31/2019.     ASSESSMENT:  1. S.AUREUS BACTEREMIA, MSSA-F/U BC so far negative, ? Source, only 1 bottle + so endocarditis not likely; no sign complications  2. COGNITION-seems diminished, ? Chronic  3. RENAL TRANSPLANT  4. CKD  5. Citrobacter in the urine.         RECOMMENDATION  1. Switch to zosyn which still provides reliable coverage for the MSSA bacteremia at the same time covers Citrobacter in the urine   2. Will need 4 weeks treatment of the MSSA bacteremia, zosyn for 5-7 days then switch to ancef back again as hard to make of constantly positive UA and Bacteruria in this patient. But at the same time renal transplant patient so hard to ignore the bacteruria.       Paramjit Mckenzie MD    Interval History   Afebrile       Physical Exam   Temp: 98.2  F (36.8  C) Temp src: Oral BP: 134/61   Heart Rate: 70 Resp: 18 SpO2: 90 % O2 Device: None (Room air)    Vitals:    10/31/19 1306 11/01/19 0500   Weight: 103.9 kg (229 lb) 103 kg (227 lb 1.2 oz)     Vital Signs with Ranges  Temp:  [98.2  F (36.8  C)-98.8  F (37.1  C)] 98.2  F (36.8  C)  Heart Rate:  [69-82] 70  Resp:  [18] 18  BP: (131-145)/(55-64) 134/61  SpO2:  [90 %-98 %] 90 %    Constitutional: Awake  Lungs: Clear to auscultation bilaterally, no crackles or wheezing  Cardiovascular: Regular rate and rhythm, normal S1 and S2, and no murmur noted  Abdomen: Normal bowel sounds, soft, non-distended, non-tender  Skin: No rashes, no cyanosis, no edema  Other:    Medications       amLODIPine  10 mg Oral Daily     aspirin  81 mg Oral QPM     famotidine  20 mg Oral At Bedtime     gabapentin  100 mg Oral TID     insulin aspart  5 Units Subcutaneous TID w/meals     insulin aspart  1-7 Units Subcutaneous TID AC     insulin aspart  1-5 Units Subcutaneous At Bedtime     insulin  glargine  20 Units Subcutaneous At Bedtime     labetalol  300 mg Oral BID     levothyroxine  50 mcg Oral Daily     piperacillin-tazobactam  3.375 g Intravenous Q6H     pravastatin  10 mg Oral QPM     prazosin  2 mg Oral At Bedtime     predniSONE  5 mg Oral Daily     sodium bicarbonate  650 mg Oral Daily     sodium chloride (PF)  3 mL Intracatheter Q8H     tacrolimus  2 mg Oral BID       Data   All microbiology laboratory data reviewed.  Recent Labs   Lab Test 11/04/19 0851 11/03/19  0548 11/02/19  0552   WBC 7.1 6.8 7.0   HGB 9.9* 10.3* 10.4*   HCT 31.2* 33.2* 33.2*   MCV 90 91 92   * 126* 132*     Recent Labs   Lab Test 11/04/19  0851 11/03/19  0548 11/02/19  0552   CR 2.43* 2.34* 2.27*     Recent Labs   Lab Test 05/25/16  2116   SED 53*     Recent Labs   Lab Test 11/03/19 2008 11/02/19  1521 11/01/19  1549 11/01/19  1540 10/31/19  1404 10/31/19  1310 09/17/19  1230 08/06/19  1343 07/19/19  1455   CULT No growth after 2 days No growth after 3 days No growth after 4 days No growth after 4 days No growth after 5 days  Cultured on the 2nd day of incubation:  Staphylococcus aureus  *  Critical Value/Significant Value, preliminary result only, called to and read back by   Johana Robison RN 11/1/2019 @1100 HDP      (Note)  POSITIVE for STAPHYLOCOCCUS AUREUS and NEGATIVE for the mecA gene  (not MRSA) by [employer] multiplex nucleic acid test. The mecA gene was  not detected. Final identification and antimicrobial susceptibility  testing will be verified by standard methods.    Specimen tested with [employer]ig[employer] multiplex, gram-positive blood culture  nucleic acid test for the following targets: Staph aureus, Staph  epidermidis, Staph lugdunensis, other Staph species, Enterococcus  faecalis, Enterococcus faecium, Streptococcus species, S. agalactiae,  S. anginosus grp., S. pneumoniae, S. pyogenes, Listeria sp., mecA  (methicillin resistance) and Viridiana/B (vancomycin resistance).    Critical Value/Significant Value called  to and read back by GENE WONG AT 1350 11/1/19    >100,000 colonies/mL  Citrobacter freundii complex  *  >100,000 colonies/mL  Staphylococcus aureus  *  >100,000 colonies/mL  urogenital santos  Susceptibility testing not routinely done   >100,000 colonies/mL  mixed urogenital santos  Susceptibility testing not routinely done   No growth  No growth  >100,000 colonies/mL  Citrobacter freundii complex  *  10,000 to 50,000 colonies/mL  Candida albicans / dubliniensis  Candida albicans and Candida dubliniensis are not routinely speciated  Susceptibility testing not routinely done  * No anaerobes isolated  Light growth  Staphylococcus aureus  *       Attestation:  Total time on the floor involved in the patient's care: 35 minutes. Total time spent in counseling/care coordination: >50%

## 2019-11-05 NOTE — PLAN OF CARE
DATE & TIME: 11/4/19 8511-0614                  Cognitive Concerns/ Orientation : A+Ox 2, disoriented to place/situation.    BEHAVIOR & AGGRESSION TOOL COLOR: Green    CIWA SCORE: NA      ABNL VS/O2: VSS, on RA    MOBILITY: Bedbound at baseline. Turn and repo Q2, total cares.    PAIN MANAGMENT: Denies    DIET: Tolerating DD3 diet.     BOWEL/BLADDER: Johnson in place, no BM this shift.     ABNL LAB/BG: Cr 2.43.     DRAIN/DEVICES: Johnson in place.     TELEMETRY RHYTHM: NSR    SKIN: Sacral mepilex in place, blanchable redness to coccyx.     TESTS/PROCEDURES: NA    D/C DAY/GOALS/PLACE: Discharge TBD    OTHER IMPORTANT INFO: CPAP at bedtime. Will need PICC/Midline for long term PIV antibiotics, in ID note. Q4 Neuros intact. WOC/Nephrology/Speech/ID following. Nursing will continue to monitor. 2100: pt refusing all meds and cares.

## 2019-11-05 NOTE — PROGRESS NOTES
Renal Medicine Progress Note                                Gem Jade MRN# [id number]   Age: 73 year old YOB: 1945   Date of Admission: 10/31/2019 Hospital LOS: 5                  Assessment/Plan:         1.  ROB/CKD IV.     -at or near baseline renal function     2.  ESKD s/p LRRT.    -Pt is taking tac / pred and is doing fine.  She follows c Dr. Elizabeth at the  for her kidney tx.    3.  MSSA bacteremia / UTI.  Pt is on cefazolin and she is responding well clinically     4.  HTN.     -recent increase in labetalol dose     5.  RTA.      -PO HCO3- started      Labs remain stable  Following labs    Encourage PO intake          Interval History:     Comfortable in bed  Pleasant  Labs reviewed with patient    IO and UO noted        ROS:     GENERAL: NAD, No fever,chills  E/M: NEGATIVE for ear, mouth and throat problems  R: NEGATIVE for significant cough or SOB  CV: NEGATIVE for chest pain, palpitations  EXT: no change edema  ROS otherwise negative    Medications and Allergies:     Reviewed    Physical Exam:     Vitals were reviewed  Patient Vitals for the past 8 hrs:   BP Temp Temp src Heart Rate Resp SpO2   11/05/19 0743 134/61 98.2  F (36.8  C) Oral 70 18 90 %     I/O last 3 completed shifts:  In: 123 [P.O.:120; I.V.:3]  Out: 950 [Urine:950]    Vitals:    10/31/19 1306 11/01/19 0500   Weight: 103.9 kg (229 lb) 103 kg (227 lb 1.2 oz)       GENERAL: awake, alert, follows  HEENT: NC/AT, PERRLA, EOMI, non icteric, pharynx moist without lesion  RESP:  clear anteriorly  CV: RRR, normal S1 S2  ABDOMEN: soft, nontender, no HSM or masses and bowel sounds normal  MS: no clubbing, cyanosis   SKIN: clear without significant rashes or lesions  NEURO: speech normal and cranial nerves 2-12 intact  PSYCH: affect normal/bright  EXT: warm, trace edema    Data:     Recent Labs   Lab 11/05/19  0816 11/04/19  0851 11/03/19  0548 11/02/19  0552    139 140 139   POTASSIUM 4.4 3.9 4.4 4.3   CHLORIDE 112* 111* 114*  112*   CO2 20 20 16* 18*   ANIONGAP 8 8 10 9   * 74 219* 197*   BUN 43* 45* 44* 48*   CR 2.39* 2.43* 2.34* 2.27*   GFRESTIMATED 19* 19* 20* 21*   GFRESTBLACK 23* 22* 23* 24*   ROGER 8.6 8.7 8.3* 8.6         Recent Labs   Lab Test 11/05/19  0816 11/04/19  0851 11/03/19  0548 11/02/19  0552 11/01/19  0920 10/31/19  1404 10/08/19  1200 09/17/19  1230 09/10/19  1215 08/09/19  0834   CR 2.39* 2.43* 2.34* 2.27* 2.50* 2.69* 2.43* 2.16* 2.23* 1.85*     Recent Labs   Lab 11/02/19  0552 10/31/19  1404   ALBUMIN 3.0* 3.2*     Recent Labs   Lab 11/04/19  0851 11/03/19  0548 11/02/19  0552 11/01/19  0920   PHOS  --   --  4.2  --    HGB 9.9* 10.3* 10.4* 10.4*         G Dimitry Phillips MD    [facility] - Nephrology  [phone]

## 2019-11-05 NOTE — PLAN OF CARE
DATE & TIME: 11/05/19 7773-7181           Cognitive Concerns/ Orientation : A&Ox3, disoriented to time  BEHAVIOR & AGGRESSION TOOL COLOR: Green  CIWA SCORE: NA    ABNL VS/O2: VSS on RA  MOBILITY: Assist of 2/ lift. Turn/repo q 2 hrs  PAIN MANAGMENT: Denies   DIET: DD3 with thin liquids  BOWEL/BLADDER: Chronic rico, incontinent of stool  ABNL LAB/BG: Creat 2.39 , 162  DRAIN/DEVICES: Rico intact and patent, PIV SL  TELEMETRY RHYTHM: NSR  SKIN: Sacral mepilex in place, blanchable redness to coccyx.   TESTS/PROCEDURES: None  D/C DAY/GOALS/PLACE: Discharge pending  OTHER IMPORTANT INFO: Q4 Neuros intact. WOC/Nephrology/Speech/ID following. Contact isolation maintained.

## 2019-11-05 NOTE — PLAN OF CARE
DATE & TIME: 11/05/19 8377-5097   Cognitive Concerns/ Orientation : A&O x3, disoriented to time, visual hallucinations at times   BEHAVIOR & AGGRESSION TOOL COLOR: Green  CIWA SCORE: NA   ABNL VS/O2: VSS on room air   MOBILITY: Assist of 2/ lift. Turn/repo q 2 hrs  PAIN MANAGMENT: Denies   DIET: DD3 with thin liquids  BOWEL/BLADDER: Chronic rico, incontinent of stool x1, large loose stool  ABNL LAB/BG:   DRAIN/DEVICES: Rico intact and patent, PIV L arm saline locked  TELEMETRY RHYTHM: NSR  SKIN: Sacral mepilex in place, blanchable redness to coccyx.   TESTS/PROCEDURES: None  D/C DAY/GOALS/PLACE: Discharge pending  OTHER IMPORTANT INFO: Will need PICC/Midline for long term PIV antibiotics, in ID note. Q4 Neuros intact. WOC/Nephrology/Speech/ID following. Contact isolation maintained.

## 2019-11-05 NOTE — PROGRESS NOTES
[facility]  Hospitalist Progress Note   11/05/2019          Assessment and Plan:        Gem Jade is a 73 year old female with history of recurrent cathter associated UTI (last admitted in 8/2019), CKD with hx of renal transplant, type II DM, HTN, hyperlipidemia, bedbound admitted due to altered mental status and UTI     Acute encephalopathy likely multifactorial.  Delirium during hospitalization improving.  Patient having recurrent encephalopathy in the setting of UTIs.  Prior to admission on Neurontin and baclofen. Multiple admissions since the beginning of this year, concern if she has any cognitive impairment at baseline.    CT head no acute pathology.  Bump in creatinine noted.  Liver function test within normal limit.  TSH normal in July 2019.  Vitamin B12 normal in June 2019.  Folic acid 8.0.  CPK 76, CRP less than 2.9.  Treated urinary tract infection with which mental status improving.  PTA Neurontin and baclofen, tramadol was on hold since admission, restart PTA Neurontin and baclofen at low dose today.  Followed by psychiatry 11/2 for delirium during hospitalization, recommend PRN Seroquel 50 mg 3 times daily for paranoia and anxiety.   will step up as surrogate decision-maker in current setting.  Need cognitive evaluation as outpatient.  Was followed by PT, OT and speech therapy this stay.    Catheter associated UTI recurrent.  Staph aureus bacteremia   Afebrile on admission.  WBC, lactic acid within normal limits. Chest x-ray no acute pathology.   UA +nitrites, LE, >180 WBC.   Urine cultures with Citrobacter, MSSA  Blood cultures 10/31 Citrobacter and MSSA.  Repeat blood cultures 11/1, 11/2, 11/3 no growth to date.  Catheter last changed at home on 10/30  Was on ceftriaxone, vancomycin.  Switch to Zosyn on 11 4.  Infectious disease following, further antibiotic recommendations per ID.  Appreciate recommendation.     Acute kidney injury  Hx of renal transplant and  CKD-III-IV  Most recent creatinine range 2.2-2.4. Renal transplant in 1999. Living donor.   History of recurrent urinary tract infections, acute on chronic kidney injury since the beginning of this year.   Creatinine improved from 2.69 to 2.39   Nephrology following, renal function at her new baseline.  Started on bicarbonate for renal tubular acidosis.  Appreciate recommendation.  Continue with PTA Prednisone and Tacrolimus  Avoid NSAIDs/nephrotoxins     Type II DM with diabetic retinopathy, neuropathy, gastroparesis   Hemoglobin A1c 5.8.  Continue PTA Lantus at 20 units at bedtime  Continue PTA aspart at at lower dose of 5 units with meals.  [PTA aspart 10 units]  Low intensity sliding scale insulin.  Monitor blood sugars closely.     HTN  Hyperlipidemia:  Continue PTA amlodipine, labetalol.  Continue PTA aspirin, pravastatin.  Continue PTA prazosin.     Hypothyroidism:  Continue PTA levothyroxine     GERD.  Continue PTA famotidine     History of diabetic neuropathy and back pain.   Restart PTA Neurontin at lower dose of 100 mg 3 times daily.  Restarted PTA baclofen at lower dose of 2.5 mg 3 times a day as needed for bladder spasms.     History of provoked bilateral PE:   Not on anticoagulation. Follow with PCP     History of paroxysmal atrial fibrillation.  Not on anticoagulation prior to admission.     Obstructive sleep apnea on CPAP:   CPAP as per home settings     History of seizure disorder.  On review of chart prior to admission not on seizure medication.     Obesity with a BMI of 33.53.  Will need to consider lifestyle modification with diet and exercise as able to.     Physical deconditioning from acute illness, chronic debility.  Patient at home, on review of previous records mostly bedbound.  Admitted multiple times since the beginning of this year for acute encephalopathy in the setting of recurrent UTIs.  PT, OT recommend to return home with spouse.  Speech therapy Home with  SLP     Orders Placed  This Encounter      Combination Diet Dysphagia Diet Level 3: Advanced; Thin Liquids (water, ice chips, juice, milk gelatin, ice cream, etc)      DVT Prophylaxis: SCDs.  Subcutaneous heparin.  Code Status: Full Code  Disposition: Expected discharge pending infectious disease clearance, safe discharge plan in place.     Discussed with patient, bedside RN, care coordinator    Magalis Jay MD        Interval History:      Patient up and awake, lying in bed.  Appears comfortable.  States has one large big bowel movement this morning.  Denies any chest pain or shortness of breath.  Denies any headache or dizziness.  No nausea vomiting.  Complaining of pain in her back in her legs.          Physical Exam:        Physical Exam   Temp:  [98.2  F (36.8  C)-98.8  F (37.1  C)] 98.2  F (36.8  C)  Heart Rate:  [69-82] 70  Resp:  [18] 18  BP: (131-145)/(55-64) 134/61  SpO2:  [90 %-98 %] 90 %    Intake/Output Summary (Last 24 hours) at 11/5/2019 1400  Last data filed at 11/5/2019 0513  Gross per 24 hour   Intake --   Output 950 ml   Net -950 ml       Admission Weight: 103.9 kg (229 lb)  Current Weight: 103 kg (227 lb 1.2 oz)    PHYSICAL EXAM  GENERAL: Patient is in no distress. Alert and oriented.  HEART: Regular rate and rhythm. S1S2. No murmurs  LUNGS: Bilateral decreased breath sounds, no wheezing no crackles.  ABDOMEN: Soft, no abdominal tenderness, bowel sounds heard   No suprapubic tenderness, no costovertebral angle tenderness.  NEURO: Moving all extremities.    EXTREMITIES: pedal edema  SKIN: Warm, dry.   PSYCHIATRY Cooperative         Medications:          amLODIPine  10 mg Oral Daily     aspirin  81 mg Oral QPM     famotidine  20 mg Oral At Bedtime     gabapentin  100 mg Oral TID     insulin aspart  5 Units Subcutaneous TID w/meals     insulin aspart  1-7 Units Subcutaneous TID AC     insulin aspart  1-5 Units Subcutaneous At Bedtime     insulin glargine  20 Units Subcutaneous At Bedtime     labetalol  300 mg Oral  BID     levothyroxine  50 mcg Oral Daily     piperacillin-tazobactam  3.375 g Intravenous Q6H     pravastatin  10 mg Oral QPM     prazosin  2 mg Oral At Bedtime     predniSONE  5 mg Oral Daily     sodium bicarbonate  650 mg Oral Daily     sodium chloride (PF)  3 mL Intracatheter Q8H     tacrolimus  2 mg Oral BID     acetaminophen, baclofen, glucose **OR** dextrose **OR** glucagon, lidocaine 4%, lidocaine (buffered or not buffered), melatonin, naloxone, ondansetron **OR** ondansetron, polyethylene glycol, QUEtiapine, senna-docusate **OR** senna-docusate, sodium chloride (PF)         Data:      All new lab and imaging data was reviewed.

## 2019-11-06 NOTE — PROGRESS NOTES
Patient is in enteric isolation, last C diff was positive in 2014, no recent positive C diff. If loose stool will check for C diff and then can put back in enteric precaution, does not need enteric precautions for positive C diff from 2014.     Paramjit Mckenzie MD  Infectious Disease

## 2019-11-06 NOTE — TELEPHONE ENCOUNTER
Also received VM from Keshawn about concerns Shantelle will be discharged to TCU for her to complete IV abx.      Called Keshawn, provided reassurance.  Per hospital notes from RN care coordination, pt will need IV abx therapy x30 days and pt/spouse concerns about TCU placement have been heard.      Will huddle with PCP to inform of patient concern as well.     Discussed with spouse regarding need for 4 weeks of IV antibiotic at discharge per MD . Writer sent info to [facility] and received a call back stating, patient does not have coverage for home IV infusion.   Writer discussed this with patient and spouse  Who was not very happy about it as the only option for full coverage was in a TCU. Spouse Chang stating patient has been in hospital numerous times for the same reason and is surprised that she will need IVAB at this time. Patient stating she does not have much to live for expecting returning home. Would like to spend Thanksgiving with family and celebrate her birthday.. Writer assured that we would send information to different IV infusion companies and request the lowest price for home iV Infusion.   Discussed that MD will speak with Spouse prior to finalizing options.  PLAN  Financial costs for the patient include Home IVAB .  Patient given options and choices for discharge . Yes  Patient/family is agreeable to the plan?  No  Patient anticipates discharging to  Home .    Conchita Toledo, VivianD, BCACP

## 2019-11-06 NOTE — TELEPHONE ENCOUNTER
Reason for Call:  FYI    Detailed comments: Patient's  had called very upset and swearing stating that patient is in the hospital and will be sent off to a nursing home for an antibiotic IV for 30 Days.  Patients  states that he had called yesterday also.        Phone Number Patient can be reached at: Cell number on file:     Telephone Information:   Mobile [phone]       Best Time: Anytime    Can we leave a detailed message on this number? YES    Call taken on 11/6/2019 at 8:39 AM by Nanda Horan

## 2019-11-06 NOTE — PLAN OF CARE
DATE & TIME: 11/6 AM                    Cognitive Concerns/ Orientation : A&Ox4, forgetful, Has had no hallucinations this shift.   BEHAVIOR & AGGRESSION TOOL COLOR: Green   CIWA SCORE: NA    ABNL VS/O2: VSS on RA   MOBILITY: Up with two assist - Lift. Pt is baseline bed bound. T&R q2h   PAIN MANAGMENT: Denies pain   DIET: Regular   BOWEL/BLADDER: Incontinent of Bowel, Johnson cath patent.   ABNL LAB/BG: /149, Cr 2.76   DRAIN/DEVICES: PIV SL - IV zosyn q6h   TELEMETRY RHYTHM: Discontinued, Was NSR   SKIN: Blanchable redness to coccyx, New mepilex placed. Scars, Missing toes on R foot   TESTS/PROCEDURES: NA   D/C DAY/GOALS/PLACE: Discharge 2+ days pending placement and midline placement   OTHER IMPORTANT INFO:  has been very verbally abusive to staff over the phone today, he has called patient relations and Staff has also been talking with patient relations. He's very frustrated that there has been no communication about wife's POC. CC has been updating patient, rounding MD to also call  today.

## 2019-11-06 NOTE — PROGRESS NOTES
[facility]    Infectious Disease Progress Note    Date of Service (when I saw the patient): 11/06/2019     Assessment & Plan   Gem Jade is a 73 year old female who was admitted on 10/31/2019.     ASSESSMENT:  1. S.AUREUS BACTEREMIA, MSSA-F/U BC so far negative, ? Source, only 1 bottle + so endocarditis not likely; no sign complications  2. COGNITION-seems diminished, ? Chronic  3. RENAL TRANSPLANT  4. CKD  5. Citrobacter in the urine.         RECOMMENDATION  1. On zosyn which still provides reliable coverage for the MSSA bacteremia at the same time covers Citrobacter in the urine   2. Will need 4 weeks treatment of the MSSA bacteremia, zosyn for 5 days then switch to ancef back again as hard to make of constantly positive UA and Bacteruria in this patient. But at the same time renal transplant patient so hard to ignore the bacteruria.     Attempted to speak with the  at 3 pm at [phone], no answer, will try again tomorrow. Discussed with Dr. Jay who was able to update the spouse earlier today.     Paramjit Mckenzie MD    Interval History   Afebrile       Physical Exam   Temp: (P) 99.4  F (37.4  C) Temp src: Oral BP: 128/56 Pulse: 68 Heart Rate: 70 Resp: 14 SpO2: 99 % O2 Device: None (Room air)    Vitals:    10/31/19 1306 11/01/19 0500   Weight: 103.9 kg (229 lb) 103 kg (227 lb 1.2 oz)     Vital Signs with Ranges  Temp:  [98.6  F (37  C)-99.4  F (37.4  C)] (P) 99.4  F (37.4  C)  Pulse:  [68] 68  Heart Rate:  [68-75] 70  Resp:  [14-18] 14  BP: (128-150)/(54-58) 128/56  SpO2:  [97 %-99 %] 99 %    Constitutional: Awake  Lungs: Clear to auscultation bilaterally, no crackles or wheezing  Cardiovascular: Regular rate and rhythm, normal S1 and S2, and no murmur noted  Abdomen: Normal bowel sounds, soft, non-distended, non-tender  Skin: No rashes, no cyanosis, no edema  Other:    Medications       amLODIPine  10 mg Oral Daily     aspirin  81 mg Oral QPM     famotidine  20 mg Oral At Bedtime      gabapentin  100 mg Oral TID     insulin aspart  5 Units Subcutaneous TID w/meals     insulin aspart  1-7 Units Subcutaneous TID AC     insulin aspart  1-5 Units Subcutaneous At Bedtime     insulin glargine  20 Units Subcutaneous At Bedtime     labetalol  300 mg Oral BID     levothyroxine  50 mcg Oral Daily     piperacillin-tazobactam  3.375 g Intravenous Q6H     pravastatin  10 mg Oral QPM     prazosin  2 mg Oral At Bedtime     predniSONE  5 mg Oral Daily     sodium bicarbonate  650 mg Oral Daily     sodium chloride (PF)  3 mL Intracatheter Q8H     tacrolimus  2 mg Oral BID       Data   All microbiology laboratory data reviewed.  Recent Labs   Lab Test 11/04/19  0851 11/03/19  0548 11/02/19  0552   WBC 7.1 6.8 7.0   HGB 9.9* 10.3* 10.4*   HCT 31.2* 33.2* 33.2*   MCV 90 91 92   * 126* 132*     Recent Labs   Lab Test 11/05/19  0816 11/04/19  0851 11/03/19  0548   CR 2.39* 2.43* 2.34*     Recent Labs   Lab Test 05/25/16  2116   SED 53*     Recent Labs   Lab Test 11/03/19 2008 11/02/19  1521 11/01/19  1549 11/01/19  1540 10/31/19  1404 10/31/19  1310 09/17/19  1230 08/06/19  1343 07/19/19  1455   CULT No growth after 3 days No growth after 4 days No growth after 5 days No growth after 5 days No growth  Cultured on the 2nd day of incubation:  Staphylococcus aureus  *  Critical Value/Significant Value, preliminary result only, called to and read back by   Johana Robison RN 11/1/2019 @1100 [facility]      (Note)  POSITIVE for STAPHYLOCOCCUS AUREUS and NEGATIVE for the mecA gene  (not MRSA) by [employer] multiplex nucleic acid test. The mecA gene was  not detected. Final identification and antimicrobial susceptibility  testing will be verified by standard methods.    Specimen tested with [employer] multiplex, gram-positive blood culture  nucleic acid test for the following targets: Staph aureus, Staph  epidermidis, Staph lugdunensis, other Staph species, Enterococcus  faecalis, Enterococcus faecium, Streptococcus  species, S. agalactiae,  S. anginosus grp., S. pneumoniae, S. pyogenes, Listeria sp., mecA  (methicillin resistance) and Viridiana/B (vancomycin resistance).    Critical Value/Significant Value called to and read back by GENE WONG AT 1350 11/1/19    >100,000 colonies/mL  Citrobacter freundii complex  *  >100,000 colonies/mL  Staphylococcus aureus  *  >100,000 colonies/mL  urogenital santos  Susceptibility testing not routinely done   >100,000 colonies/mL  mixed urogenital santos  Susceptibility testing not routinely done   No growth  No growth  >100,000 colonies/mL  Citrobacter freundii complex  *  10,000 to 50,000 colonies/mL  Candida albicans / dubliniensis  Candida albicans and Candida dubliniensis are not routinely speciated  Susceptibility testing not routinely done  * No anaerobes isolated  Light growth  Staphylococcus aureus  *       Attestation:  Total time on the floor involved in the patient's care: 35 minutes. Total time spent in counseling/care coordination: >50%

## 2019-11-06 NOTE — PROGRESS NOTES
[facility]  Hospitalist Progress Note   11/06/2019          Assessment and Plan:        Gem Jade is a 73 year old female with history of recurrent cathter associated UTI (last admitted in 8/2019), CKD with hx of renal transplant, type II DM, HTN, hyperlipidemia, bedbound admitted due to altered mental status and UTI     Acute encephalopathy likely multifactorial.  Delirium during hospitalization improving.  Patient having recurrent encephalopathy in the setting of UTIs.  Prior to admission on Neurontin and baclofen. Multiple admissions since the beginning of this year, concern if she has any cognitive impairment at baseline.  CT head no acute pathology.  Bump in creatinine noted.  Liver function test within normal limit.  TSH normal in July 2019.  Vitamin B12 normal in June 2019.  Folic acid 8.0.  CPK 76, CRP less than 2.9.  Followed by psychiatry 11/2 for delirium during hospitalization, recommend PRN Seroquel 50 mg 3 times daily for paranoia and anxiety.    Treated urinary tract infection with which mental status improving. Alert and oriented x3.  PTA Neurontin and baclofen, tramadol was on hold since admission, restarted PTA Neurontin and baclofen at low dose 11/5.  Consider cognitive evaluation as outpatient.  Followed by PT, OT and speech therapy this stay.    Catheter associated UTI recurrent.  Staph aureus, citrobacter bacteremia - cleared   Afebrile on admission.  WBC, lactic acid within normal limits. Chest x-ray no acute pathology.   UA + nitrites, LE, >180 WBC.   Urine cultures with Citrobacter, MSSA  Blood cultures 10/31 Citrobacter and MSSA.  Repeat blood cultures 11/1, 11/2, 11/3 no growth to date.  Catheter last changed at home on 10/30  Was on ceftriaxone, vancomycin.  Switch to Zosyn on 11/4.  Infectious disease following, further antibiotic recommendations per ID.  Appreciate recommendation.     Acute kidney injury  Hx of renal transplant and CKD-III-IV  Most recent  creatinine range 2.2-2.4. Renal transplant in 1999. Living donor.   History of recurrent urinary tract infections, acute on chronic kidney injury since the beginning of this year.   Creatinine improved from 2.69 to 2.39  > 2.76   Nephrology following, started on bicarbonate for renal tubular acidosis.  Appreciate recommendation.  Continue with PTA Prednisone and Tacrolimus  Avoid NSAIDs/nephrotoxins     Type II DM with diabetic retinopathy, neuropathy, gastroparesis   Hemoglobin A1c 5.8.  Continue PTA Lantus at 20 units at bedtime  Continue PTA aspart at at lower dose of 5 units with meals.  [PTA aspart 10 units]  Continue Insulin sliding scale insulin.  Monitor blood sugars closely.     HTN  Hyperlipidemia:  Continue PTA amlodipine, labetalol.  Continue PTA aspirin, pravastatin.  Continue PTA prazosin.     Hypothyroidism:  Continue PTA levothyroxine     GERD.  Continue PTA famotidine     History of diabetic neuropathy and back pain.   Continue PTA Neurontin at lower dose of 100 mg 3 times daily (11/5).  Continue PTA baclofen at lower dose of 2.5 mg 3 times a day (11/5) as needed for bladder spasms.     History of provoked bilateral PE:   Not on anticoagulation. Follow with PCP     History of paroxysmal atrial fibrillation.  Not on anticoagulation prior to admission.     Obstructive sleep apnea on CPAP:   CPAP as per home settings     History of seizure disorder.  On review of chart prior to admission not on seizure medication.     Obesity with a BMI of 33.53.  Will need to consider lifestyle modification with diet and exercise as able to.     Physical deconditioning from acute illness, chronic debility.  Patient lives at home, bedbound.  Admitted multiple times since the beginning of this year for acute encephalopathy in the setting of recurrent UTIs.  PT, OT recommend to return home with spouse.    Speech therapy Home with HH SLP.  Speech therapy reconsulted today per patient request.     Orders Placed This  Encounter      Regular Diet Adult      DVT Prophylaxis: SCDs.  Subcutaneous heparin.  Code Status: Full Code  Disposition: Expected discharge pending infectious disease clearance, safe discharge plan in place.     Discussed with patient, infectious disease attending, bedside RN, care coordinator.  Discussed with patient's  Chang over the telephone, verbally abusive and expressing frustration with care, transition on IV antibiotics and not being switched to oral as done in the past.  At length discussion with him, attempts made to explain to him need for IV antibiotics was not successful.  Did explain we will have Dr. Mckenzie reach out to him for further discussion on antibiotics in the next 24 hours, at end of encounter he had apologized to me.  Did discuss with Dr. Mckenzie who will reach out to Chang.     Magalis Jay MD        Interval History:      Patient up and awake, lying in bed. Appears comfortable.  Denies any chest pain or shortness of breath.  Denies any headache or dizziness.  No nausea vomiting.  Afebrile.  Patient on dysphagia diet per speech therapy recommendations earlier this admission.  Patient would like to advance her diet.          Physical Exam:        Physical Exam   Temp:  [98.6  F (37  C)-99.4  F (37.4  C)] 99.4  F (37.4  C)  Pulse:  [68] 68  Heart Rate:  [68-75] 70  Resp:  [14-18] 14  BP: (128-150)/(54-58) 128/56  SpO2:  [97 %-99 %] 99 %    Intake/Output Summary (Last 24 hours) at 11/5/2019 1400  Last data filed at 11/5/2019 0513  Gross per 24 hour   Intake --   Output 950 ml   Net -950 ml     Admission Weight: 103.9 kg (229 lb)  Current Weight: 103 kg (227 lb 1.2 oz)    PHYSICAL EXAM  GENERAL: Patient is in no distress. Alert and oriented.  LUNGS: Bilateral decreased breath sounds, no wheezing no crackles.  ABDOMEN: Soft, no abdominal tenderness, bowel sounds heard   No suprapubic tenderness, no costovertebral angle tenderness.  Chronic Johnson in place.  NEURO: Moving all  extremities.    EXTREMITIES: pedal edema  SKIN: Warm, dry.   PSYCHIATRY Cooperative         Medications:          amLODIPine  10 mg Oral Daily     aspirin  81 mg Oral QPM     famotidine  20 mg Oral At Bedtime     gabapentin  100 mg Oral TID     insulin aspart  5 Units Subcutaneous TID w/meals     insulin aspart  1-7 Units Subcutaneous TID AC     insulin aspart  1-5 Units Subcutaneous At Bedtime     insulin glargine  20 Units Subcutaneous At Bedtime     labetalol  300 mg Oral BID     levothyroxine  50 mcg Oral Daily     piperacillin-tazobactam  3.375 g Intravenous Q6H     pravastatin  10 mg Oral QPM     prazosin  2 mg Oral At Bedtime     predniSONE  5 mg Oral Daily     sodium bicarbonate  650 mg Oral Daily     sodium chloride (PF)  10 mL Intracatheter Q8H     sodium chloride (PF)  3 mL Intracatheter Q8H     tacrolimus  2 mg Oral BID     acetaminophen, baclofen, glucose **OR** dextrose **OR** glucagon, lidocaine 4%, lidocaine (buffered or not buffered), melatonin, naloxone, ondansetron **OR** ondansetron, polyethylene glycol, QUEtiapine, senna-docusate **OR** senna-docusate, sodium chloride (PF), sodium chloride (PF)         Data:      All new lab and imaging data was reviewed.

## 2019-11-06 NOTE — PLAN OF CARE
DATE & TIME: 11/06/19 1281-1056   Cognitive Concerns/ Orientation : A&O x4, forget   BEHAVIOR & AGGRESSION TOOL COLOR: Green  CIWA SCORE: NA   ABNL VS/O2: VSS except HTN on room air   MOBILITY: Assist of 2/ lift, turn/repo q 2 hrs  PAIN MANAGMENT: Denies   DIET: DD3 with thin liquids  BOWEL/BLADDER: Johnson, incontinent of bowel- 2 loose stools this shift  ABNL LAB/BG:   DRAIN/DEVICES: Johnson intact and patent, PIV L arm saline locked  TELEMETRY RHYTHM: NSR  SKIN: Sacral mepilex in place, blanchable redness to coccyx.   TESTS/PROCEDURES: None  D/C DAY/GOALS/PLACE: Discharge pending  OTHER IMPORTANT INFO: Q4 Neuros intact. WOC/Nephrology/Speech/ID following. Continues on IV Zosyn. Contact isolation maintained.

## 2019-11-06 NOTE — PROGRESS NOTES
Discussed with Dr. Mckenize in regards to whether this pt needs to be enteric Isolation. MD states that there's no indication that she would have Cdiff and that pt does not need to be in Enteric Isolation.     Contact Isolation remains

## 2019-11-06 NOTE — CONSULTS
Care Transition Initial Assessment - RN  Met with: Patient and Family.  DATA   Active Problems:    Altered mental status     Cognitive Status: alert and oriented.   Contact information and PCP information verified: Yes  Lives With: facility resident    Insurance concerns: No Insurance issues identified  ASSESSMENT  Patient currently receives the following services:     open to [facility] for RN and HHA      Identified issues/concerns regarding health management: Met with patient and spoke with patients spouse regarding discharge plans. Patient known to our team due to frequent readmissions.  Spouse stating patient at baseline is bed bound and gets frequent UTIs wit sepsis. Spouse aware due to her complicated medical history she needs medical attention. Patient has home care through [facility] and would like to continue it. Patient has all equipments that's necessary to assist with cares.  Discussed with spouse regarding need for 4 weeks of IV antibiotic at discharge per MD . Writer sent info to [facility] and received a call back stating, patient does not have coverage for home IV infusion.   Writer discussed this with patient and spouse  Who was not very happy about it as the only option for full coverage was in a TCU. Spouse Chang stating patient has been in hospital numerous times for the same reason and is surprised that she will need IVAB at this time. Patient stating she does not have much to live for expecting returning home. Would like to spend Thanksgiving with family and celebrate her birthday.. Writer assured that we would send information to different IV infusion companies and request the lowest price for home iV Infusion.   Discussed that MD will speak with Spouse prior to finalizing options.  PLAN  Financial costs for the patient include Home IVAB .  Patient given options and choices for discharge . Yes  Patient/family is agreeable to the plan?  No  Patient anticipates discharging to  Home .   Patient  anticipates needs for home equipment: Yes  Transportation/person available to transport on day of discharge  is  Spouse and have they been notified  Plan/Disposition: Home   Appointments: to be completed      Care  (CTS) will continue to follow as needed.      Heraclio Roman RN  Inpatient Carecoordinator  [facility]  [phone]

## 2019-11-06 NOTE — PLAN OF CARE
Discharge Planner SLP   Patient plan for discharge: Not addressed.   Current status: Patient was seen for swallow treatment at noon meal for diet tolerance of DDL 3 with thin liquids. Patient with sufficient mastication and bolus extraction and clearing with roast beef. Tolerating thin liquids via the cup without any Sx of aspiration.     Recommend: 1. Advance to regular textures and continue with thin liquids. 2. Upright, no straws, small bites/sips, alternate liquids/solids as needed. 3. SLP will follow up for one more session to ensure diet tolerance.   Barriers to return to prior living situation: None  Recommendations for discharge: Home with previous care.   Rationale for recommendations: No further treatment will be indicated at discharge. Patient is near her baseline.        Entered by: Pily Boyce 11/06/2019 2:39 PM

## 2019-11-07 NOTE — PROGRESS NOTES
"BRIEF NUTRITION ASSESSMENT    REASON FOR ASSESSMENT:  Gem Jade is a 73 year old female seen by Registered Dietitian for LOS    NUTRITION HISTORY:  - Patient describes a history of getting asymptomatic UTIs, eventually becoming septic, and then unresponsive which have brought her into the hospital multiple times. After admission, the antibiotics always give her diarrhea so she does not eat very well for the first few days.   - Also noted she had a kidney transplant 2 years ago.   - In general her appetite and intakes are very good. She eats 2 meals/day, and describes herself as a . She and her  apparently wrote a cookbook.   - She is a \"night owl\" for the most part, nothing gets done before noon. Recently she noticed that her \"clock has shifted\" as she has been waking up in the early morning.   - Pt is [place], and prefers traditional foods. Follows low salt diet at home.     CURRENT DIET AND INTAKE:  Diet:  Regular             100% intakes recorded. Patient notes that her appetite is fine, but since she is coping with diarrhea she has been eating less than normal. Has questions about which foods to avoid at this time. She loves the stir gooden and roast beef here. Also likes the omelets.   Notes that her nephrologist has been telling her she needs to eat more, and drink more fluids. Since then she states she \"has been eating like crazy\". She dislikes the water here, states she's \"spoiled\" at home with the [facility] water.     ANTHROPOMETRICS:  Height: 5' 9\"  Weight:  227 lbs 1.18 oz (103 kg)   Body mass index is 33.53 kg/m .   Weight Status: Obesity Grade I BMI 30-34.9  IBW:  65.9 kg  %IBW: 156%  Weight History: Pt denies unintentional weight changes.   Wt Readings from Last 10 Encounters:   11/01/19 103 kg (227 lb 1.2 oz)   08/28/19 103.9 kg (229 lb)   07/21/19 107.9 kg (237 lb 14 oz)   06/14/19 113.9 kg (251 lb)   06/07/19 113.7 kg (250 lb 10.6 oz)   04/27/19 107.8 kg (237 lb 10.5 oz)   03/03/19 113 " kg (249 lb 1.9 oz)   09/17/18 101.3 kg (223 lb 4.8 oz)   07/08/18 101.6 kg (223 lb 15.8 oz)   06/01/18 105.3 kg (232 lb 2.3 oz)       LABS/MEDS/Physical Findings:  Reviewed   Recent Labs   Lab 11/07/19  0809 11/07/19  0205 11/06/19  2122 11/06/19  1733 11/06/19  1251 11/06/19  0735  11/05/19  0816  11/04/19  0851  11/03/19  0548  11/02/19  0552  11/01/19  0920  10/31/19  1404   GLC  --   --   --   --   --   --   --  206*  --  74  --  219*  --  197*  --  114*  --  152*   * 146* 193* 166* 149* 181*   < >  --    < >  --    < >  --    < >  --    < >  --    < >  --     < > = values in this interval not displayed.       11/6 - Wocn: Coccxy/Sacrum and bilateral buttocks: skin is basically intact. Scarring noted. Pt has blanchable erythema @ base of coccyx crease with a few areas of scattered denudement.   Bilateral IT's: skin intact with some superficial peeling noted. Area of blanchable venous congestion.     MALNUTRITION:  Patient does not meet two of the following criteria necessary for diagnosing malnutrition: significant weight loss, reduced intake, subcutaneous fat loss, muscle loss or fluid retention. Nutrition Focused Physical Assessment (NFPA) not appropriate at this time.    NUTRITION INTERVENTION:  Nutrition Diagnosis:  No nutrition diagnosis at this time.    Implementation:  Nutrition Education: Recommended alternate choices on menu, discussed role of smoothie supplement for more protein.     FOLLOW UP/MONITORING:   Will re-evaluate in 7 - 10 days, or sooner, if re-consulted.    Melly Ballard RD, [facility], [address], 06, MH   Pager: [phone]  Weekend Pager: [phone]

## 2019-11-07 NOTE — PROGRESS NOTES
Therapy:IV Abx  Insurance:Medicare and CIGNA Supplement Plan     Pt has Medicare and CIGNA Supplement, which does not cover IV ABX in the home. (Pt would have coverage for short term TCU or IC). Below is what pt would be responsible for if pt wanted to go w [facility]              Drug would go to Part-D (pt would be responsible for the co-pay per dispense)            Pt would have to self-pay for the per-parth (daily)            If not homebound, nursing would also be self-pay (per visit)    Please contact Intake with any questions, [phone] or In Basket pool, [facility] ([phone]).    [facility]-In reference to admission on 10/31/2019 to check on IV Abx coverage

## 2019-11-07 NOTE — PROGRESS NOTES
Renal Medicine Progress Note                                Gem Jade MRN# [id number]   Age: 73 year old YOB: 1945   Date of Admission: 10/31/2019 Hospital LOS: 7                  Assessment/Plan:         1.  ROB/CKD IV.     -at or near baseline renal function     2.  ESKD s/p LRRT.    -Pt is taking tac / pred and is doing fine.  She follows c Dr. Elizabeth at the  for her kidney tx.    3.  MSSA bacteremia / UTI.  Pt is on cefazolin and she is responding well clinically     4.  HTN.     -recent increase in labetalol dose     5.  RTA.      -PO HCO3- started      1 liter NS  BMP in am      Interval History:     Comfortable in bed  Pleasant  Labs reviewed with patient    IO and UO noted        ROS:     GENERAL: NAD, No fever,chills  E/M: NEGATIVE for ear, mouth and throat problems  R: NEGATIVE for significant cough or SOB  CV: NEGATIVE for chest pain, palpitations  EXT: no change edema  ROS otherwise negative    Medications and Allergies:     Reviewed    Physical Exam:     Vitals were reviewed  Patient Vitals for the past 8 hrs:   BP Temp Temp src Heart Rate Resp SpO2   11/07/19 0743 (!) 146/54 98.1  F (36.7  C) Oral 64 18 96 %     I/O last 3 completed shifts:  In: 240 [P.O.:240]  Out: 1325 [Urine:1325]    Vitals:    10/31/19 1306 11/01/19 0500   Weight: 103.9 kg (229 lb) 103 kg (227 lb 1.2 oz)       GENERAL: awake, alert, follows  HEENT: NC/AT, PERRLA, EOMI, non icteric, pharynx moist without lesion  RESP:  clear anteriorly  CV: RRR, normal S1 S2  ABDOMEN: soft, nontender, no HSM or masses and bowel sounds normal  MS: no clubbing, cyanosis   SKIN: clear without significant rashes or lesions  NEURO: speech normal and cranial nerves 2-12 intact  PSYCH: affect normal/bright  EXT: warm, trace edema    Data:     Recent Labs   Lab 11/07/19  0743 11/06/19  0944 11/05/19  0816 11/04/19  0851 11/03/19  0548 11/02/19  0552   NA  --   --  140 139 140 139   POTASSIUM  --   --  4.4 3.9 4.4 4.3   CHLORIDE  --    --  112* 111* 114* 112*   CO2  --   --  20 20 16* 18*   ANIONGAP  --   --  8 8 10 9   GLC  --   --  206* 74 219* 197*   BUN  --   --  43* 45* 44* 48*   CR 2.74* 2.76* 2.39* 2.43* 2.34* 2.27*   GFRESTIMATED 17* 16* 19* 19* 20* 21*   GFRESTBLACK 19* 19* 23* 22* 23* 24*   ROGER  --   --  8.6 8.7 8.3* 8.6         Recent Labs   Lab Test 11/07/19  0743 11/06/19  0944 11/05/19  0816 11/04/19  0851 11/03/19  0548 11/02/19  0552 11/01/19  0920 10/31/19  1404 10/08/19  1200 09/17/19  1230   CR 2.74* 2.76* 2.39* 2.43* 2.34* 2.27* 2.50* 2.69* 2.43* 2.16*     Recent Labs   Lab 11/02/19  0552 10/31/19  1404   ALBUMIN 3.0* 3.2*     Recent Labs   Lab 11/04/19  0851 11/03/19  0548 11/02/19  0552 11/01/19  0920   PHOS  --   --  4.2  --    HGB 9.9* 10.3* 10.4* 10.4*         G Dimitry Phillips MD    [facility] - Nephrology  [phone]

## 2019-11-07 NOTE — PROGRESS NOTES
Care Transition  - RN  Met with patient to discuss  Home IV antibiotic medication prices with patient.  Discussed with patient that I had sent information to [facility] and [facility] infusion services. Patient noted that [facility] has offered the lowest price for Home Infusion and decided to use [facility]. Patient noted that patient's cost is about $588 for 21 days. Patient has  Home care services that will do Midline dressing changes. Information sent to [facility] home [facility].  Patient states since it is IV push her spouse will be able to do it at home.  Patient is aware of possible discharge tomorrow. Patient requesting stretcher transportation in AM.

## 2019-11-07 NOTE — PROGRESS NOTES
Clinic Care Coordination Contact    Situation: Patient chart reviewed by care coordinator.    Background: due for pro-active outreach to patient but chart review reveals she is currently hospitalized for UTI, bacteremia    Assessment: as above    Plan/Recommendations: will contact patient once discharged from hospital    Porsha Khalil RN  [facility]-Care Coordination  [facility]  [facility]  [phone]

## 2019-11-07 NOTE — PROGRESS NOTES
Discussed with patient that since she is A&O that she is able to make own medical decision. She can call her  on her own to discuss her POC but we will not be calling to ask her 's permission on medical decisions, He is not her medical POA or Guardian. If he is able to have a calm cooperative conversation, staff is able to discuss POC with him, if not, staff should direct him to call his wife or vice versa. Discussed this with CC, Charge RN and RN manager.

## 2019-11-07 NOTE — PROGRESS NOTES
[facility]  Hospitalist Progress Note   11/07/2019          Assessment and Plan:        Gem Jade is a 73 year old female with history of recurrent cathter associated UTI (last admitted in 8/2019), CKD with hx of renal transplant, type II DM, HTN, hyperlipidemia, bedbound admitted due to altered mental status and UTI     Acute encephalopathy likely multifactorial - improved.  Delirium during hospitalization improved  Patient having recurrent encephalopathy in the setting of UTIs.  Prior to admission on Neurontin and baclofen. Multiple admissions since the beginning of this year. CT head no acute pathology. Liver function test within normal limit.  TSH normal in July 2019.  Vitamin B12 normal in June 2019.  Folic acid 8.0.  CPK 76, CRP less than 2.9.  Followed by psychiatry 11/2 for delirium during hospitalization, recommend PRN Seroquel 50 mg 3 times daily for paranoia and anxiety. Appreciate recommendation.   Treated urinary tract infection with which mental status improved and is alert and oriented x3.  PTA Neurontin and baclofen, tramadol was on hold since admission, restarted PTA Neurontin and baclofen at low dose 11/5.  Consider cognitive evaluation as outpatient.  Followed by PT, OT and speech therapy this stay.    Catheter associated UTI recurrent.  Staph aureus, citrobacter bacteremia - cleared   Afebrile on admission.  WBC, lactic acid within normal limits. Chest x-ray no acute pathology.   UA + nitrites, LE, >180 WBC.   Urine cultures with Citrobacter, MSSA  Blood cultures 10/31 Citrobacter and MSSA.  Repeat blood cultures 11/1, 11/2, 11/3 no growth to date.  Catheter last changed at home on 10/30  Was on ceftriaxone, vancomycin.  Switched to Zosyn on 11/4 till 11/8 and then switch to Ancef for total of 4 weeks per ID.  Appreciate recommendation.     Acute kidney injury  Hx of renal transplant and CKD-III-IV  Most recent creatinine range 2.2-2.4. Renal transplant in 1999. Living  donor.   History of recurrent urinary tract infections, acute on chronic kidney injury since the beginning of this year.   Creatinine improved from 2.69 to 2.39  > 2.76   Nephrology following, started on bicarbonate for renal tubular acidosis.  Appreciate recommendation.  Continue with PTA Prednisone and Tacrolimus  Avoid NSAIDs/nephrotoxins     Type II DM with diabetic retinopathy, neuropathy, gastroparesis   Hemoglobin A1c 5.8.  Continue PTA Lantus at 20 units at bedtime  Continue PTA aspart at at lower dose of 5 units with meals.  [PTA aspart 10 units]  Continue Insulin sliding scale insulin.  Monitor blood sugars closely.     HTN  Hyperlipidemia:  Continue PTA amlodipine  PTA labetalol dose increased this stay from 150 mg twice daily to 300 mg twice daily.  Continue PTA prazosin.  Continue PTA aspirin, pravastatin.     Hypothyroidism:  Continue PTA levothyroxine     GERD.  Continue PTA famotidine     History of diabetic neuropathy and back pain.   Continue PTA Neurontin at lower dose of 100 mg 3 times daily (11/5).  Continue PTA baclofen at lower dose of 2.5 mg 3 times a day (11/5) as needed for bladder spasms.     History of provoked bilateral PE:   Not on anticoagulation. Follow with PCP     History of paroxysmal atrial fibrillation.  Not on anticoagulation prior to admission.  Continue PTA aspirin.     Obstructive sleep apnea on CPAP:   CPAP as per home settings     History of seizure disorder.  On review of chart prior to admission not on seizure medication.     Obesity with a BMI of 33.53.  Will need to consider lifestyle modification with diet and exercise as able to.     Physical deconditioning from acute illness, chronic debility.  Patient lives at home, bedbound.  Admitted multiple times since the beginning of this year for acute encephalopathy in the setting of recurrent UTIs.  PT, OT recommend to return home with spouse.       Orders Placed This Encounter      Regular Diet Adult      DVT Prophylaxis:  SCDs.  Subcutaneous heparin.  Code Status: Full Code  Disposition: Expected discharge pending infectious disease clearance tentatively tomorrow.      Discussed with patient, bedside RN, care coordinator    Magalis Jay MD        Interval History:      Patient up and awake, lying in bed. Appears comfortable.  Denies any chest pain or shortness of breath.  Denies any headache or dizziness.  No nausea vomiting.  Afebrile.  Patient happy that she is being switched to regular diet after speech therapy eval yesterday.         Physical Exam:        Physical Exam   Temp:  [97.5  F (36.4  C)-98.7  F (37.1  C)] 98.1  F (36.7  C)  Heart Rate:  [64-72] 64  Resp:  [12-18] 18  BP: (145-155)/(54-67) 146/54  SpO2:  [96 %-98 %] 96 %    Intake/Output Summary (Last 24 hours) at 11/5/2019 1400  Last data filed at 11/5/2019 0513  Gross per 24 hour   Intake --   Output 950 ml   Net -950 ml     Admission Weight: 103.9 kg (229 lb)  Current Weight: 103 kg (227 lb 1.2 oz)    PHYSICAL EXAM  GENERAL: Patient is in no distress. Alert and oriented.  LUNGS: Bilateral decreased breath sounds, no wheezing no crackles.  ABDOMEN: Soft, no abdominal tenderness, bowel sounds heard   No suprapubic tenderness, no costovertebral angle tenderness.  Chronic Johnson in place.  NEURO: Moving all extremities.    EXTREMITIES: pedal edema  SKIN: Warm, dry.   PSYCHIATRY Cooperative         Medications:          amLODIPine  10 mg Oral Daily     aspirin  81 mg Oral QPM     famotidine  20 mg Oral At Bedtime     gabapentin  100 mg Oral TID     insulin aspart  5 Units Subcutaneous TID w/meals     insulin aspart  1-7 Units Subcutaneous TID AC     insulin aspart  1-5 Units Subcutaneous At Bedtime     insulin glargine  20 Units Subcutaneous At Bedtime     labetalol  300 mg Oral BID     levothyroxine  50 mcg Oral Daily     piperacillin-tazobactam  3.375 g Intravenous Q6H     pravastatin  10 mg Oral QPM     prazosin  2 mg Oral At Bedtime     predniSONE  5 mg Oral Daily      sodium bicarbonate  650 mg Oral Daily     sodium chloride (PF)  10 mL Intracatheter Q8H     sodium chloride (PF)  3 mL Intracatheter Q8H     tacrolimus  2 mg Oral BID     acetaminophen, baclofen, glucose **OR** dextrose **OR** glucagon, lidocaine 4%, lidocaine (buffered or not buffered), melatonin, naloxone, ondansetron **OR** ondansetron, polyethylene glycol, QUEtiapine, senna-docusate **OR** senna-docusate, sodium chloride (PF), sodium chloride (PF)         Data:      All new lab and imaging data was reviewed.

## 2019-11-07 NOTE — PLAN OF CARE
DATE & TIME: 11/6/19 4241-7661                 Cognitive Concerns/ Orientation : A&Ox4, forgetful.   BEHAVIOR & AGGRESSION TOOL COLOR: Green   CIWA SCORE: NA    ABNL VS/O2: VSS on RA   MOBILITY: Up with two assist - Lift. Pt is baseline bed bound.  turn/repo q2h.  PAIN MANAGMENT: Pt states she has pain when supine. Educated/reinforced turn/repo q2h. Pt able to assist with moving.   DIET: Regular   BOWEL/BLADDER: Incontinent of Bowel, Johnson cath patent.   ABNL LAB/BG: /, Cr 2.76   DRAIN/DEVICES: PIV SL - IV zosyn q6h   TELEMETRY RHYTHM: N/A  SKIN: Blanchable redness to coccyx, New mepilex placed during day shift - CDI. Scars, Missing toes on R foot   TESTS/PROCEDURES: NA   D/C DAY/GOALS/PLACE: Discharge 2+ days pending placement and midline placement   OTHER IMPORTANT INFO: During day shift  was very verbally abusive to staff over the phone today, he contacted patient relations. He's very frustrated that there has been no communication about wife's POC. CC has been updating patient, rounding MD to also call  today.  came to the unit during evening shift and was pleasant with staff.

## 2019-11-07 NOTE — PLAN OF CARE
DATE & TIME: 11/7 AM                    Cognitive Concerns/ Orientation : A&Ox4, forgetful    BEHAVIOR & AGGRESSION TOOL COLOR: Green   CIWA SCORE: NA    ABNL VS/O2: VSS on RA   MOBILITY: Total, Baseline is bed bound. T&R q2h.   PAIN MANAGMENT: C/o mild pain in buttocks/coccyx area - barrier cream applied.   DIET: Reg diet   BOWEL/BLADDER: Incontinent of bowel. Johnson cath patent  ABNL LAB/BG: /86 - held scheduled 5units of Novolog at lunch. Cr 2.74 - Neph ordered 50ml/hr NS for only 1L.   DRAIN/DEVICES: Midline placed in RUE. PIV to LUE. Kept PIV just in case for any additional interventions.   TELEMETRY RHYTHM: NA   SKIN: Pale, blanchable redness to coccyx - mepilex CDI. Scars, Missing toes to R foot.   TESTS/PROCEDURES: NA   D/C DAY/GOALS/PLACE: Discharge possibly tomorrow pending ID recs and placement, Home vs. Nursing Home(?)   OTHER IMPORTANT INFO: Pt's  has again been rude to staff over the phone. RN supervisor and manager aware and OK for staff not to talk with  and only transfer calls to patient's room.

## 2019-11-07 NOTE — PLAN OF CARE
Discharge Planner SLP   Patient plan for discharge: Did not discuss  Current status: Pt tolerated regular solid trial with adequate oral manipulation and clearance. No s/sx of aspiration with solids or thin liquids via cup or straw sip.     Continue regular solids and thin liquids with general safe swallow precautions. Upright position, small bites/sips, slow rate of intake, and alternate solids and liquids    Barriers to return to prior living situation: None per SLP  Recommendations for discharge: Home  Rationale for recommendations: No further SLP services warranted. Dysphagia goals met and pt tolerating a regular diet      Speech Language Therapy Discharge Summary    Reason for therapy discharge:    All goals and outcomes met, no further needs identified.    Progress towards therapy goal(s). See goals on Care Plan in [facility] electronic health record for goal details.  Goals met    Therapy recommendation(s):    No further therapy is recommended.             Entered by: Nanci Browne 11/07/2019 2:19 PM

## 2019-11-07 NOTE — PROGRESS NOTES
[facility]    Infectious Disease Progress Note    Date of Service (when I saw the patient): 11/07/2019     Assessment & Plan   Gem Jade is a 73 year old female who was admitted on 10/31/2019.     ASSESSMENT:  1. S.AUREUS BACTEREMIA, MSSA-F/U BC so far negative, ? Source, only 1 bottle + so endocarditis not likely; no sign complications  2. COGNITION-seems diminished, ? Chronic  3. RENAL TRANSPLANT  4. CKD  5. Citrobacter in the urine.         RECOMMENDATION  1. On zosyn which still provides reliable coverage for the MSSA bacteremia at the same time covers Citrobacter in the urine will do it for 5 days last day tomorrow 11/8. Then switch to Ancef for 4 weeks counting the week in hospital so 21 more days.  2. Will need 4 weeks treatment of the MSSA bacteremia, zosyn for 5 days then switch to ancef back again as hard to make of constantly positive UA and Bacteruria in this patient. But at the same time renal transplant patient so hard to ignore the bacteruria.         Paramjit Mckenzie MD    Interval History   Afebrile       Physical Exam   Temp: 98.1  F (36.7  C) Temp src: Oral BP: (!) 146/54   Heart Rate: 64 Resp: 18 SpO2: 96 % O2 Device: None (Room air)    Vitals:    10/31/19 1306 11/01/19 0500   Weight: 103.9 kg (229 lb) 103 kg (227 lb 1.2 oz)     Vital Signs with Ranges  Temp:  [97.5  F (36.4  C)-98.7  F (37.1  C)] 98.1  F (36.7  C)  Heart Rate:  [64-72] 64  Resp:  [12-18] 18  BP: (145-155)/(54-67) 146/54  SpO2:  [96 %-98 %] 96 %    Constitutional: Awake  Lungs: Clear to auscultation bilaterally, no crackles or wheezing  Cardiovascular: Regular rate and rhythm, normal S1 and S2, and no murmur noted  Abdomen: Normal bowel sounds, soft, non-distended, non-tender  Skin: No rashes, no cyanosis, no edema  Other:    Medications       amLODIPine  10 mg Oral Daily     aspirin  81 mg Oral QPM     famotidine  20 mg Oral At Bedtime     gabapentin  100 mg Oral TID     insulin aspart  5 Units Subcutaneous  TID w/meals     insulin aspart  1-7 Units Subcutaneous TID AC     insulin aspart  1-5 Units Subcutaneous At Bedtime     insulin glargine  20 Units Subcutaneous At Bedtime     labetalol  300 mg Oral BID     levothyroxine  50 mcg Oral Daily     piperacillin-tazobactam  3.375 g Intravenous Q6H     pravastatin  10 mg Oral QPM     prazosin  2 mg Oral At Bedtime     predniSONE  5 mg Oral Daily     sodium bicarbonate  650 mg Oral Daily     sodium chloride (PF)  10 mL Intracatheter Q8H     sodium chloride (PF)  3 mL Intracatheter Q8H     tacrolimus  2 mg Oral BID       Data   All microbiology laboratory data reviewed.  Recent Labs   Lab Test 11/04/19  0851 11/03/19  0548 11/02/19  0552   WBC 7.1 6.8 7.0   HGB 9.9* 10.3* 10.4*   HCT 31.2* 33.2* 33.2*   MCV 90 91 92   * 126* 132*     Recent Labs   Lab Test 11/07/19  0743 11/06/19  0944 11/05/19  0816   CR 2.74* 2.76* 2.39*     Recent Labs   Lab Test 05/25/16  2116   SED 53*     Recent Labs   Lab Test 11/03/19 2008 11/02/19  1521 11/01/19  1549 11/01/19  1540 10/31/19  1404 10/31/19  1310 09/17/19  1230 08/06/19  1343 07/19/19  1455   CULT No growth after 4 days No growth after 5 days No growth No growth No growth  Cultured on the 2nd day of incubation:  Staphylococcus aureus  *  Critical Value/Significant Value, preliminary result only, called to and read back by   Johana Robison RN 11/1/2019 @1100 \[facility] [place] [facility]\""      (Note)  POSITIVE for STAPHYLOCOCCUS AUREUS and NEGATIVE for the mecA gene  (not MRSA) by [employer] multiplex nucleic acid test. The mecA gene was  not detected. Final identification and antimicrobial susceptibility  testing will be verified by standard methods.    Specimen tested with [employer] multiplex, gram-positive blood culture  nucleic acid test for the following targets: Staph aureus, Staph  epidermidis, Staph lugdunensis, other Staph species, Enterococcus  faecalis, Enterococcus faecium, Streptococcus species, S. agalactiae,  S. anginosus grp., S.  pneumoniae, S. pyogenes, Listeria sp., mecA  (methicillin resistance) and Viridiana/B (vancomycin resistance).    Critical Value/Significant Value called to and read back by GENE WONG AT 1350 11/1/19    >100,000 colonies/mL  Citrobacter freundii complex  *  >100,000 colonies/mL  Staphylococcus aureus  *  >100,000 colonies/mL  urogenital santos  Susceptibility testing not routinely done   >100,000 colonies/mL  mixed urogenital santos  Susceptibility testing not routinely done   No growth  No growth  >100,000 colonies/mL  Citrobacter freundii complex  *  10,000 to 50,000 colonies/mL  Candida albicans / dubliniensis  Candida albicans and Candida dubliniensis are not routinely speciated  Susceptibility testing not routinely done  * No anaerobes isolated  Light growth  Staphylococcus aureus  *       Attestation:  Total time on the floor involved in the patient's care: 35 minutes. Total time spent in counseling/care coordination: >50%

## 2019-11-07 NOTE — PLAN OF CARE
DATE & TIME: 11/07/19 2521-1778   Cognitive Concerns/ Orientation : A&O x4, forgetful  BEHAVIOR & AGGRESSION TOOL COLOR: Green  CIWA SCORE: NA   ABNL VS/O2: VSS on room air   MOBILITY: Assist of 2/ lift; turn/repo q 2 hrs  PAIN MANAGMENT: Denies   DIET: Regular   BOWEL/BLADDER: Johnson, incontinent of bowel  ABNL LAB/BG:   DRAIN/DEVICES: PIV L arm saline locked  TELEMETRY RHYTHM: NA  SKIN: Blanchable redness to coccyx with mepilex CDI. Scars, missing toes on R foot   TESTS/PROCEDURES: None  D/C DAY/GOALS/PLACE: Discharge pending placement and midline placement   OTHER IMPORTANT INFO: Contact isolation maintained; rounding MD to call  today.

## 2019-11-08 NOTE — PROGRESS NOTES
Pt. Concerned about midline not drawing blood and states that when she goes home she will have to have lab draws. Informed pt. That midlines  were never meant for lab draws and that Dr. Mckenzie is aware of that. The midline flushes well and was just placed yesterday with a great blood return. Pt. States she would talk to Dr. Mckenzie about her concerns.

## 2019-11-08 NOTE — PROGRESS NOTES
Care Transition  - RN     Discussed discharge plans with patient. She requested transportation. Stretcher transportation set for 2:30 pm via [facility] . Patient Alert and oriented, stated spouse and daughter is in agreement with the plan.

## 2019-11-08 NOTE — PLAN OF CARE
DATE & TIME: 11/8/19                      Cognitive Concerns/ Orientation : Alert and Oriented x4, forgetful.    BEHAVIOR & AGGRESSION TOOL COLOR: Green   CIWA SCORE: NA    ABNL VS/O2: VSS on RA, BP elevated.   MOBILITY: Total, Lift and bed bound at baseline.  Turn and repo q2.    PAIN MANAGMENT: Denies   DIET: Regular   BOWEL/BLADDER: Johnson in place and patent, incontinent of bowels.  Patient reports loose stools.   ABNL LAB/BG: Creat 2.74  DRAIN/DEVICES: Midline to right arm   TELEMETRY RHYTHM: NA  SKIN: Blanchable redness to coccyx, mepilex in place.  Missing toes on right foot.   TESTS/PROCEDURES: NA  D/C DAY/GOALS/PLACE: Discharge today likely on IV antibiotics with home care.    OTHER IMPORTANT INFO: Patient sleeping well, neuros intact.

## 2019-11-08 NOTE — DISCHARGE SUMMARY
Discharge Summary  Hospitalist    Date of Admission:  10/31/2019  Date of Discharge:  11/8/2019  Discharging Provider: Magalis Jay MD    Primary Care Physician   Marivel Barcenas NP  Primary Care Provider Phone Number: [phone]  Primary Care Provider Fax Number: [phone]    PRINCIPAL DIAGNOSIS  Acute encephalopathy likely multifactorial - improved.  Delirium during hospitalization improved  Catheter associated UTI recurrent.  Staph aureus, citrobacter bacteremia - cleared   Acute kidney injury  Physical deconditioning from acute illness, chronic debility.      Past Medical History:   Diagnosis Date     Background diabetic retinopathy(362.01)     extensive diabetic retinopathy, s/p multiple laser treatments     Diabetic gastroparesis (H)     followed by [facility] (Dr. 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)      Urinary tract infection, site not specified     Chronic UTIs     UTI (urinary tract infection) due to urinary indwelling catheter (H)        History of Present Illness   Gem Jade is an 73 year old female who presented with confusion    Hospital Course      Gem Jade is a 73 year old female with history of recurrent cathter associated UTI (last admitted in 8/2019), CKD with hx of renal transplant, type II DM, HTN, hyperlipidemia, bedbound admitted due to altered mental status and UTI     Acute encephalopathy likely multifactorial - improved.  Delirium during hospitalization improved  Patient having  recurrent encephalopathy in the setting of UTIs.  Prior to admission on Neurontin and baclofen. Multiple admissions since the beginning of this year.   CT head no acute pathology. Liver function test within normal limit.  TSH normal in July 2019.  Vitamin B12 normal in June 2019.    Folic acid 8.0.  CPK 76, CRP less than 2.9.  Followed by psychiatry 11/2 for delirium during hospitalization, recommend PRN Seroquel 50 mg 3 times daily for paranoia and anxiety.  Delirium resolved, has not needed any Seroquel in the last 3 days.    Treated urinary tract infection with which mental status improved and is alert and oriented x3.  PTA Neurontin and baclofen, tramadol was on hold since admission, restarted PTA Neurontin and baclofen at low dose 11/5.  Consider cognitive evaluation as outpatient.  Followed by PT, OT and speech therapy this stay.     Catheter associated UTI recurrent.  Staph aureus, citrobacter bacteremia - cleared   Afebrile on admission.  WBC, lactic acid within normal limits. Chest x-ray no acute pathology.   UA + nitrites, LE, >180 WBC.   Urine cultures with Citrobacter, MSSA  Blood cultures 10/31 Citrobacter and MSSA.  Repeat blood cultures 11/1, 11/2, 11/3 no growth to date.  Catheter last changed at home on 10/30.  Catheter K.  Was on ceftriaxone, vancomycin.  Switched to Zosyn on 11/4 till 11/8 and then switch to Ancef for 3 weeks per ID.  Repeat UA with urine cultures after completing course of antibiotic.   Home RN services requested.    Acute kidney injury  Hx of renal transplant and CKD-III-IV  Most recent creatinine range 2.2-2.4. Renal transplant in 1999. Living donor.   History of recurrent urinary tract infections, acute on chronic kidney injury since the beginning of this year.   Creatinine improved from 2.69 to 2.39  > 2.76 >2.6  Nephrology followed, started on bicarbonate for renal tubular acidosis. Recommend to follow-up with primary nephrologist at [place].    Follow-up renal function  in 5 days or unclear if symptomatic.  Continue with PTA Prednisone and Tacrolimus  Avoid NSAIDs/nephrotoxins     Type II DM with diabetic retinopathy, neuropathy, gastroparesis   Hemoglobin A1c 5.8.  Continue PTA Lantus at 20 units at bedtime  Continue PTA aspart at at lower dose of 5 units with meals.  [PTA aspart 10 units]  Continue Insulin sliding scale insulin.  Monitor blood sugars at home and review on provider visit     HTN  Hyperlipidemia:  PTA labetalol dose increased this stay from 150 mg twice daily to 300 mg twice daily.  Continue PTA amlodipine  Continue PTA prazosin.  Continue PTA aspirin, pravastatin.  Monitor blood pressure at home and review on provider visit.     Hypothyroidism:  Continue PTA levothyroxine     GERD.  Continue PTA famotidine     History of diabetic neuropathy and back pain.   Continue PTA Neurontin at lower dose of 100 mg 3 times daily (11/5).  Continue PTA baclofen at lower dose of 2.5 mg 3 times a day (11/5) as needed for bladder spasms.  Hold above meds if drowsy.     History of provoked bilateral PE:   Not on anticoagulation. Follow with PCP     History of paroxysmal atrial fibrillation.  Not on anticoagulation prior to admission.  Continue PTA aspirin.     Obstructive sleep apnea on CPAP:   Continue CPAP as per home settings     History of seizure disorder.  On review of chart prior to admission not on seizure medication.     Obesity with a BMI of 33.53.  Will need to consider lifestyle modification with diet and exercise as able to.     Physical deconditioning from acute illness, chronic debility.  Patient lives at home, bedbound.  Admitted multiple times since the beginning of this year for acute encephalopathy in the setting of recurrent UTIs.  PT, OT recommend to return home with spouse.       Magalis Jay MD, MD    Pending Results   These results will be followed up by ordering provider  Unresulted Labs Ordered in the Past 30 Days of this Admission     Date and Time  Order Name Status Description    11/3/2019 1753 Blood culture Preliminary              Physical Exam   Vitals:    10/31/19 1306 11/01/19 0500   Weight: 103.9 kg (229 lb) 103 kg (227 lb 1.2 oz)     Vital Signs with Ranges  Temp:  [97.4  F (36.3  C)-99.4  F (37.4  C)] 99.4  F (37.4  C)  Pulse:  [68-77] 68  Heart Rate:  [18] 18  Resp:  [18] 18  BP: (149-167)/(54-68) 149/54  SpO2:  [97 %-98 %] 97 %  I/O last 3 completed shifts:  In: 680 [P.O.:680]  Out: 750 [Urine:750]  PHYSICAL EXAM  GENERAL: Patient is in no distress. Alert and oriented.  LUNGS: Bilateral decreased breath sounds, no wheezing no crackles.  ABDOMEN: Soft, no abdominal tenderness, bowel sounds heard   No suprapubic tenderness, no costovertebral angle tenderness.  Chronic Johnson in place.  NEURO: Moving all extremities.    EXTREMITIES: pedal edema  SKIN: Warm, dry.   PSYCHIATRY Cooperative    )Consultations This Hospital Stay   SOCIAL WORK IP CONSULT  NEPHROLOGY IP CONSULT  INFECTIOUS DISEASES IP CONSULT  SPEECH LANGUAGE PATH ADULT IP CONSULT  PHYSICAL THERAPY ADULT IP CONSULT  OCCUPATIONAL THERAPY ADULT IP CONSULT  PHARMACY TO DOSE VANCO  WOUND OSTOMY CONTINENCE NURSE  IP CONSULT  CARE TRANSITION RN/SW IP CONSULT  PSYCHIATRY IP CONSULT  OCCUPATIONAL THERAPY ADULT IP CONSULT  PHYSICAL THERAPY ADULT IP CONSULT  VASCULAR ACCESS ADULT IP CONSULT  SPEECH LANGUAGE PATH ADULT IP CONSULT  CARE TRANSITION RN/SW IP CONSULT  VASCULAR ACCESS ADULT IP CONSULT    Time Spent on this Encounter   IMagalis MD, personally saw the patient today and spent greater than 30 minutes discharging this patient.  Discussed with patient, nephrologist, bedside RN, care coordinator.    Discharge Orders      Home care nursing referral      Reason for your hospital stay    You were admitted to the hospital with altered mental status, had UTI, transient bacteremia, acute on chronic kidney injury.  Evaluated by nephrology, infectious disease, psychiatry, rehab team during  hospitalization.     Follow-up and recommended labs and tests     Follow up with primary care provider, Marivel Barcenas NP, within 7 days for hospital follow- up.  The following labs/tests are recommended: Check CBC, CMP in 1 week or earlier if symptomatic.  Repeat urine cultures after completing course of antibiotic.  Consider neuro cognitive evaluation as outpatient.  Antibiotics per infectious disease recommendation.     Monitor and record    Monitor blood sugars at home at least twice daily and review on provider visit, optimize insulin regimen.  Monitor blood pressures at home and review on provider visit.     Activity    Your activity upon discharge: activity as tolerated     Discharge Instructions    Midline care. Johnson catheter care.  Turn in position and out of bed as able to to prevent pressure injury.  Avoid narcotics, minimize use of muscle relaxants to as needed.  Hold if drowsy or any change in mental status.  Avoid nephrotoxic drugs.  Consider lifestyle modification with diet and exercise as able to.     Diet    Follow this diet upon discharge: Orders Placed This Encounter      Snacks/Supplements Adult: Other; Smoothie + benepro; With Meals       Discharge Medications   Current Discharge Medication List      START taking these medications    Details   ceFAZolin (ANCEF) 1 GM vial Inject 2 g into the vein every 12 hours for 21 days CBC with differential, creatinine, SGOT weekly while on this medication to be faxed to Dr. Olmedo office.  Refills: 0    Associated Diagnoses: Bacteremia      sodium bicarbonate 650 MG tablet Take 1 tablet (650 mg) by mouth daily  Qty: 30 tablet, Refills: 0    Comments: Future refills by PCP Dr. Marivel Barcenas NP with phone number [phone].  Associated Diagnoses: CKD (chronic kidney disease) stage 3, GFR 30-59 ml/min (H)         CONTINUE these medications which have CHANGED    Details   baclofen (LIORESAL) 10 MG tablet Take 0.25-0.5 tablets (2.5-5  mg) by mouth 2 times daily as needed for other (Bladder Spasms)  Qty: 60 tablet, Refills: 3    Associated Diagnoses: Urinary catheter (Johnson) change required      gabapentin (NEURONTIN) 100 MG capsule Take 1 capsule (100 mg) by mouth 3 times daily  Qty: 30 capsule, Refills: 0    Comments: Future refills by PCP Dr. Marivel Barcenas, ALISON with phone number [phone].  Associated Diagnoses: Back pain, unspecified back location, unspecified back pain laterality, unspecified chronicity      insulin aspart (NOVOLOG FLEXPEN) 100 UNIT/ML pen Take 5 units with meals + sliding scale if eating. Sliding scale only at bedtime when not eating. Max 25 units per day. Hold if pre-meal glucose is <90  Sliding scale:   140-199 2u  200-249 4u  250-299 6u  300-349 8u  350-399 10u  Over 400 12u  Qty: 45 mL, Refills: 3    Associated Diagnoses: Type 2 diabetes mellitus with diabetic polyneuropathy, with long-term current use of insulin (H)      labetalol (NORMODYNE) 300 MG tablet Take 1 tablet (300 mg) by mouth 2 times daily  Qty: 30 tablet, Refills: 0    Comments: Future refills by PCP Dr. Marivel Barcenas, ALISON with phone number [phone].  Associated Diagnoses: Benign essential hypertension         CONTINUE these medications which have NOT CHANGED    Details   acetaminophen (TYLENOL) 325 MG tablet Take 325-650 mg by mouth every 4 hours as needed       amLODIPine (NORVASC) 5 MG tablet TAKE TWO TABLETS (10MG) BY MOUTH DAILY  Qty: 60 tablet, Refills: 2    Associated Diagnoses: Benign essential hypertension      aspirin 81 MG tablet Take 81 mg by mouth daily      CRANBERRY PO Take 1 tablet by mouth 2 times daily       diphenoxylate-atropine (LOMOTIL) 2.5-0.025 MG tablet Take 1-2 tablets by mouth 4 times daily as needed for diarrhea  Qty: 360 tablet, Refills: 0    Associated Diagnoses: Diarrhea, unspecified type      famotidine (PEPCID) 20 MG tablet Take 1 tablet (20 mg) by mouth 2 times daily  Qty: 60 tablet, Refills: 1       gentamicin irrigation Irrigate rico cath weekly 60ml of 400mg of gentamicinin [id number] of 0.9% Sodium Chloride  Qty: 1 Bottle, Refills: 1    Associated Diagnoses: Recurrent UTI      insulin glargine (LANTUS SOLOSTAR) 100 UNIT/ML pen Inject 20 Units Subcutaneous At Bedtime  Qty: 30 mL, Refills: 3    Comments: Please dispense 90-day supply if able at next fill.  Associated Diagnoses: Type 2 diabetes mellitus with diabetic polyneuropathy, with long-term current use of insulin (H)      Lactobacillus Rhamnosus, GG, (CULTURELL) capsule Take 1 capsule by mouth 2 times daily  Qty: 60 capsule, Refills: 11    Associated Diagnoses: Diarrhea      lidocaine (XYLOCAINE) 2 % external gel Apply topically 3 times daily  Qty: 85 mL, Refills: 11    Associated Diagnoses: Rico catheter in place      loratadine (CLARITIN) 10 MG tablet Take 1 tablet (10 mg) by mouth daily  Qty: 90 tablet, Refills: 3    Associated Diagnoses: Acute nasopharyngitis      menthol-zinc oxide (CALMOSEPTINE) 0.44-20.625 % OINT ointment Apply topically 4 times daily as needed for skin protection    Associated Diagnoses: Skin abrasion      ondansetron (ZOFRAN ODT) 4 MG ODT tab Take 1 tablet (4 mg) by mouth daily as needed for nausea 30 minutes before getting up in your wheelchair  Qty: 20 tablet, Refills: 1    Associated Diagnoses: Vertigo      pravastatin (PRAVACHOL) 10 MG tablet Take 1 tablet (10 mg) by mouth daily  Qty: 90 tablet, Refills: 3    Associated Diagnoses: Hyperlipidemia LDL goal <100      prazosin (MINIPRESS) 1 MG capsule Take 2 capsules (2 mg) by mouth At Bedtime  Qty: 60 capsule, Refills: 1    Associated Diagnoses: Nightmares      predniSONE (DELTASONE) 5 MG tablet Take 1 tablet (5 mg) by mouth daily  Qty: 90 tablet, Refills: 3    Associated Diagnoses: Kidney replaced by transplant      simethicone (MYLICON) 125 MG CHEW Take 1 tablet (125 mg) by mouth as needed for intestinal gas  Qty: 120 tablet      tacrolimus (GENERIC EQUIVALENT) 1 MG  capsule Take 2 capsules (2 mg) by mouth 2 times daily  Qty: 120 capsule, Refills: 11    Associated Diagnoses: Kidney replaced by transplant      vitamin D3 (CHOLECALCIFEROL) 2000 units (50 mcg) tablet Take 2,000 Units by mouth 2 times daily       blood glucose (NO BRAND SPECIFIED) lancets standard Use to test blood sugar 3 times daily or as directed. (Accuchek guide)  Qty: 300 each, Refills: 3    Associated Diagnoses: Type 2 diabetes mellitus with hyperglycemia, with long-term current use of insulin (H)      blood glucose (NO BRAND SPECIFIED) test strip Use to test blood sugar 3 times daily or as directed. (accuchek guide)  Qty: 300 strip, Refills: 3    Associated Diagnoses: Type 2 diabetes mellitus with hyperglycemia, with long-term current use of insulin (H)      COMFORT EZ PEN NEEDLES 31G X 6 MM miscellaneous USE 4 DAILY OR AS DIRECTED  Qty: 100 each, Refills: 3    Associated Diagnoses: Type 2 diabetes mellitus (H)      cyanocobalamin (CYANOCOBALAMIN) 1000 MCG/ML injection Inject 1 mL (1,000 mcg) into the muscle every 2 months  Qty: 3 mL, Refills: 3    Comments: Hold for refills, dose update  Associated Diagnoses: Iron deficiency anemia, unspecified iron deficiency anemia type      levothyroxine (SYNTHROID/LEVOTHROID) 50 MCG tablet TAKE ONE TABLET BY MOUTH EVERY DAY  Qty: 90 tablet, Refills: 0    Associated Diagnoses: Other specified hypothyroidism      !! order for DME Equipment being ordered: [employer] Mobility System, Titan 500, free standing overhead patient lift    Please send to  DME  Qty: 1 Device, Refills: 0    Associated Diagnoses: Bedbound; Class 2 obesity without serious comorbidity with body mass index (BMI) of 35.0 to 35.9 in adult, unspecified obesity type      !! order for DME Equipment being ordered: CPAP mask, [employer] Ahuja FX, nasal pillows system, women's,   Catalog # [id number]    Please send to  DME  Qty: 1 Device, Refills: 3    Associated Diagnoses: Obstructive sleep apnea syndrome      !! order  "for DME Equipment being ordered: Tegaderm 4x4   MICHELLE 6 months  Sacral wound  Qty: 1 Box, Refills: 3    Associated Diagnoses: Decubitus ulcer of sacral region, unspecified ulcer stage      !! order for DME Equipment being ordered: [employer] Iman gel-T cushion 20 inches wide & 18 inches deep    height 1.676 m (5' 6\"), weight 101.6 kg (223 lb 15.8 oz)  Qty: 1 each, Refills: 0    Associated Diagnoses: Back pain       !! - Potential duplicate medications found. Please discuss with provider.      STOP taking these medications       traMADol (ULTRAM) 50 MG tablet Comments:   Reason for Stopping:             Allergies   Allergies   Allergen Reactions     Atorvastatin Calcium      myalgias, muscle aches     Codeine Nausea and Vomiting     Darvocet [Propoxyphene N-Apap] Nausea and Vomiting     Hydromorphone      Levofloxacin Other (See Comments) and Diarrhea     hallucinations     Morphine      Nausea and vomiting     Niaspan [Niacin] GI Disturbance     Flushing even at low dose, GI upset     Percocet [Oxycodone-Acetaminophen]      Vomiting after taking med couple of times     Vicodin [Hydrocodone-Acetaminophen] Nausea and Vomiting       Discharge Disposition   Discharged to home  Condition at discharge: Good    DATA  Most Recent 3 CBC's:  Recent Labs   Lab Test 11/08/19  0733 11/04/19  0851 11/03/19  0548   WBC 7.8 7.1 6.8   HGB 9.1* 9.9* 10.3*   MCV 95 90 91   * 134* 126*      Most Recent 3 BMP's:  Recent Labs   Lab Test 11/08/19  0733 11/07/19  0743 11/06/19  0944 11/05/19  0816 11/04/19  0851     --   --  140 139   POTASSIUM 4.6  --   --  4.4 3.9   CHLORIDE 110*  --   --  112* 111*   CO2 21  --   --  20 20   BUN 37*  --   --  43* 45*   CR 2.60* 2.74* 2.76* 2.39* 2.43*   ANIONGAP 7  --   --  8 8   ROGER 7.8*  --   --  8.6 8.7   *  --   --  206* 74     Most Recent 2 LFT's:  Recent Labs   Lab Test 10/31/19  1404 07/19/19  0832   AST 10 10   ALT 16 11   ALKPHOS 89 90   BILITOTAL 0.5 0.3       Most Recent 6 " Bacteria Isolates From Any Culture (See EPIC Reports for Culture Details):  Recent Labs   Lab Test 11/03/19 2008 11/02/19  1521 11/01/19  1549 11/01/19  1540 10/31/19  1404 10/31/19  1310   CULT No growth after 5 days No growth No growth No growth No growth  Cultured on the 2nd day of incubation:  Staphylococcus aureus  *  Critical Value/Significant Value, preliminary result only, called to and read back by   Johana Robison RN 11/1/2019 @1100 HDP      (Note)  POSITIVE for STAPHYLOCOCCUS AUREUS and NEGATIVE for the mecA gene  (not MRSA) by [employer] multiplex nucleic acid test. The mecA gene was  not detected. Final identification and antimicrobial susceptibility  testing will be verified by standard methods.    Specimen tested with [employer] multiplex, gram-positive blood culture  nucleic acid test for the following targets: Staph aureus, Staph  epidermidis, Staph lugdunensis, other Staph species, Enterococcus  faecalis, Enterococcus faecium, Streptococcus species, S. agalactiae,  S. anginosus grp., S. pneumoniae, S. pyogenes, Listeria sp., mecA  (methicillin resistance) and Viridiana/B (vancomycin resistance).    Critical Value/Significant Value called to and read back by GENE WONG AT 1350 11/1/19 [facility]   >100,000 colonies/mL  Citrobacter freundii complex  *  >100,000 colonies/mL  Staphylococcus aureus  *  >100,000 colonies/mL  urogenital santos  Susceptibility testing not routinely done       Most Recent TSH, T4 and A1c Labs:  Recent Labs   Lab Test 11/01/19  0920  07/01/19  1208   TSH  --   --  2.43   T4  --   --  0.82   A1C 5.8*   < >  --     < > = values in this interval not displayed.     Results for orders placed or performed during the hospital encounter of 10/31/19   CT Head w/o Contrast    Narrative    CT SCAN OF THE HEAD WITHOUT CONTRAST   10/31/2019 4:42 PM     HISTORY: Altered mental status.    TECHNIQUE:  Axial images of the head and coronal reformations without  IV contrast material. Radiation dose for  this scan was reduced using  automated exposure control, adjustment of the mA and/or kV according  to patient size, or iterative reconstruction technique.    COMPARISON: Head CT 8/6/2019    FINDINGS:  Moderate volume loss is present. Patchy white matter  hypoattenuation likely represents chronic small vessel ischemic  change. No evidence of acute ischemia, hemorrhage, mass, mass effect,  or hydrocephalus. The visualized calvarium, tympanic cavities, mastoid  cavities, and paranasal sinuses are unremarkable.      Impression    IMPRESSION:  No acute intracranial abnormality.    ELIZABETH CHIN MD   XR Chest 2 Views    Narrative    CHEST TWO VIEWS 11/1/2019 2:45 PM     HISTORY: Bacteriemia - evaluate for infiltrates.    COMPARISON: July 15, 2019       Impression    IMPRESSION: Limited lateral view. No definite infiltrate on frontal  view. The cardiac silhouette is not enlarged. Pulmonary vasculature is  unremarkable.    HERB REDDY MD     *Note: Due to a large number of results and/or encounters for the requested time period, some results have not been displayed. A complete set of results can be found in Results Review.

## 2019-11-08 NOTE — PROGRESS NOTES
[facility]    Infectious Disease Progress Note    Date of Service (when I saw the patient): 11/08/2019     Assessment & Plan   Gem Jade is a 73 year old female who was admitted on 10/31/2019.     ASSESSMENT:  1. S.AUREUS BACTEREMIA, MSSA-F/U BC so far negative, ? Source, only 1 bottle + so endocarditis not likely; no sign complications  2. COGNITION-seems diminished, ? Chronic  3. RENAL TRANSPLANT  4. CKD  5. Citrobacter in the urine.         RECOMMENDATION  1. 5 days of  zosyn which still provides reliable coverage for the MSSA bacteremia at the same time covers Citrobacter in the urine will do it for 5 days last day today 11/8. Then switch to Ancef for 4 weeks counting the week in hospital so 21 more days.  2. Will need 4 weeks treatment of the MSSA bacteremia, zosyn for 5 days then switch to ancef back again as hard to make of constantly positive UA and Bacteruria in this patient. But at the same time renal transplant patient so hard to ignore the bacteruria.         Paramjit Mckenzie MD    Interval History   Afebrile       Physical Exam   Temp: 99.4  F (37.4  C) Temp src: Oral BP: (!) 149/54 Pulse: 68 Heart Rate: (!) 18 Resp: 18 SpO2: 97 % O2 Device: None (Room air)    Vitals:    10/31/19 1306 11/01/19 0500   Weight: 103.9 kg (229 lb) 103 kg (227 lb 1.2 oz)     Vital Signs with Ranges  Temp:  [97.4  F (36.3  C)-99.4  F (37.4  C)] 99.4  F (37.4  C)  Pulse:  [68-77] 68  Heart Rate:  [18] 18  Resp:  [18] 18  BP: (149-167)/(54-68) 149/54  SpO2:  [97 %-98 %] 97 %    Constitutional: Awake  Lungs: Clear to auscultation bilaterally, no crackles or wheezing  Cardiovascular: Regular rate and rhythm, normal S1 and S2, and no murmur noted  Abdomen: Normal bowel sounds, soft, non-distended, non-tender  Skin: No rashes, no cyanosis, no edema  Other:    Medications     sodium chloride 50 mL/hr at 11/07/19 1152       amLODIPine  10 mg Oral Daily     aspirin  81 mg Oral QPM     famotidine  20 mg Oral At  Bedtime     gabapentin  100 mg Oral TID     insulin aspart  5 Units Subcutaneous TID w/meals     insulin aspart  1-7 Units Subcutaneous TID AC     insulin aspart  1-5 Units Subcutaneous At Bedtime     insulin glargine  20 Units Subcutaneous At Bedtime     labetalol  300 mg Oral BID     levothyroxine  50 mcg Oral Daily     piperacillin-tazobactam  2.25 g Intravenous Q6H     pravastatin  10 mg Oral QPM     prazosin  2 mg Oral At Bedtime     predniSONE  5 mg Oral Daily     sodium bicarbonate  650 mg Oral Daily     sodium chloride (PF)  10 mL Intracatheter Q8H     sodium chloride (PF)  3 mL Intracatheter Q8H     tacrolimus  2 mg Oral BID       Data   All microbiology laboratory data reviewed.  Recent Labs   Lab Test 11/08/19  0733 11/04/19  0851 11/03/19  0548   WBC 7.8 7.1 6.8   HGB 9.1* 9.9* 10.3*   HCT 30.1* 31.2* 33.2*   MCV 95 90 91   * 134* 126*     Recent Labs   Lab Test 11/08/19  0733 11/07/19  0743 11/06/19  0944   CR 2.60* 2.74* 2.76*     Recent Labs   Lab Test 05/25/16  2116   SED 53*     Recent Labs   Lab Test 11/03/19  2008 11/02/19  1521 11/01/19  1549 11/01/19  1540 10/31/19  1404 10/31/19  1310 09/17/19  1230 08/06/19  1343 07/19/19  1455   CULT No growth after 5 days No growth No growth No growth No growth  Cultured on the 2nd day of incubation:  Staphylococcus aureus  *  Critical Value/Significant Value, preliminary result only, called to and read back by   Johana Robison RN 11/1/2019 @1100 HD      (Note)  POSITIVE for STAPHYLOCOCCUS AUREUS and NEGATIVE for the mecA gene  (not MRSA) by FlipGive multiplex nucleic acid test. The mecA gene was  not detected. Final identification and antimicrobial susceptibility  testing will be verified by standard methods.    Specimen tested with StockCastrigene multiplex, gram-positive blood culture  nucleic acid test for the following targets: Staph aureus, Staph  epidermidis, Staph lugdunensis, other Staph species, Enterococcus  faecalis, Enterococcus faecium,  Streptococcus species, S. agalactiae,  S. anginosus grp., S. pneumoniae, S. pyogenes, Listeria sp., mecA  (methicillin resistance) and Viridiana/B (vancomycin resistance).    Critical Value/Significant Value called to and read back by GENE WONG AT 1350 11/1/19    >100,000 colonies/mL  Citrobacter freundii complex  *  >100,000 colonies/mL  Staphylococcus aureus  *  >100,000 colonies/mL  urogenital santos  Susceptibility testing not routinely done   >100,000 colonies/mL  mixed urogenital santos  Susceptibility testing not routinely done   No growth  No growth  >100,000 colonies/mL  Citrobacter freundii complex  *  10,000 to 50,000 colonies/mL  Candida albicans / dubliniensis  Candida albicans and Candida dubliniensis are not routinely speciated  Susceptibility testing not routinely done  * No anaerobes isolated  Light growth  Staphylococcus aureus  *       Attestation:  Total time on the floor involved in the patient's care: 35 minutes. Total time spent in counseling/care coordination: >50%

## 2019-11-08 NOTE — PLAN OF CARE
A&Ox4, coop, calls approp. VSS. Denies pain. Jen diet, good appetite, ,142. Turn/repos q2h with 1 assist. Johnson patent, urine clear yellow. Inc BM x1, imodium PRN x1 given prior to discharge per pt request. Jen zosyn and ancef doses, ok to give next dose of ancef Nov 9th AM per Dr. Kee and Dr. Mckenzie. Discharge to home.     Discharge    Patient discharged to home via stretcher with HE. Discharge meds, AVS, and midline info brochures sent with pt.     Listed belongings gathered and returned to patient. No belongings found in room.  Care Plan and Patient education resolved: Yes  Prescriptions if needed, hard copies sent with patient  NA  Home and hospital acquired medications returned to patient: NA  Medication Bin checked and emptied on discharge Yes  Follow up appointment made for patient: Yes

## 2019-11-08 NOTE — PROGRESS NOTES
Renal Medicine Progress Note                                Gem Jade MRN# [id number]   Age: 73 year old YOB: 1945   Date of Admission: 10/31/2019 Hospital LOS: 8                  Assessment/Plan:         1.  ROB/CKD IV.     -at or near baseline renal function     2.  ESKD s/p LRRT.    -Pt is taking tac / pred and is doing fine.  She follows c Dr. Elizabeth at the  for her kidney tx.    3.  MSSA bacteremia / UTI.  Pt is on cefazolin and she is responding well clinically     4.  HTN.     -recent increase in labetalol dose     5.  RTA.      -PO HCO3- started      Improvement in creatinine  BMP next week    Follow-up Dr. Champagne [facility]      Interval History:     Comfortable in bed  No CP or SOB    Pleasant  Labs reviewed with patient    IO and UO noted        ROS:     GENERAL: NAD, No fever,chills  E/M: NEGATIVE for ear, mouth and throat problems  R: NEGATIVE for significant cough or SOB  CV: NEGATIVE for chest pain, palpitations  EXT: no change edema  ROS otherwise negative    Medications and Allergies:     Reviewed    Physical Exam:     Vitals were reviewed  Patient Vitals for the past 8 hrs:   BP Temp Temp src Pulse Resp SpO2   11/08/19 0845 -- -- -- -- 18 --   11/08/19 0816 (!) 149/54 99.4  F (37.4  C) Oral 68 -- 97 %     I/O last 3 completed shifts:  In: 680 [P.O.:680]  Out: 750 [Urine:750]    Vitals:    10/31/19 1306 11/01/19 0500   Weight: 103.9 kg (229 lb) 103 kg (227 lb 1.2 oz)       GENERAL: awake, alert, follows  HEENT: NC/AT, PERRLA, EOMI, non icteric, pharynx moist without lesion  RESP:  clear anteriorly  CV: RRR, normal S1 S2  ABDOMEN: soft, nontender, no HSM or masses and bowel sounds normal  MS: no clubbing, cyanosis   SKIN: clear without significant rashes or lesions  NEURO: speech normal and cranial nerves 2-12 intact  PSYCH: affect normal/bright  EXT: warm, trace edema    Data:     Recent Labs   Lab 11/08/19  0733 11/07/19  0743 11/06/19  0944 11/05/19  0816  11/04/19  0851 11/03/19  0548     --   --  140 139 140   POTASSIUM 4.6  --   --  4.4 3.9 4.4   CHLORIDE 110*  --   --  112* 111* 114*   CO2 21  --   --  20 20 16*   ANIONGAP 7  --   --  8 8 10   *  --   --  206* 74 219*   BUN 37*  --   --  43* 45* 44*   CR 2.60* 2.74* 2.76* 2.39* 2.43* 2.34*   GFRESTIMATED 18* 17* 16* 19* 19* 20*   GFRESTBLACK 20* 19* 19* 23* 22* 23*   ROGER 7.8*  --   --  8.6 8.7 8.3*         Recent Labs   Lab Test 11/08/19  0733 11/07/19  0743 11/06/19  0944 11/05/19  0816 11/04/19  0851 11/03/19  0548 11/02/19  0552 11/01/19  0920 10/31/19  1404 10/08/19  1200   CR 2.60* 2.74* 2.76* 2.39* 2.43* 2.34* 2.27* 2.50* 2.69* 2.43*     Recent Labs   Lab 11/02/19  0552   ALBUMIN 3.0*     Recent Labs   Lab 11/08/19  0733 11/04/19  0851 11/03/19  0548 11/02/19  0552   PHOS  --   --   --  4.2   HGB 9.1* 9.9* 10.3* 10.4*         G Dimitry Phillips MD    [facility] - Nephrology  [phone]

## 2019-11-08 NOTE — PLAN OF CARE
DATE & TIME: 11/7/19 1545-3014              Cognitive Concerns/ Orientation : A&Ox4, forgetful    BEHAVIOR & AGGRESSION TOOL COLOR: Green   CIWA SCORE: NA    ABNL VS/O2: VSS on RA   MOBILITY: Total, Baseline is bed bound. T&R q2h.   PAIN MANAGMENT: C/o mild pain in buttocks/coccyx area, repositioning for relief.   DIET: Regular  BOWEL/BLADDER: Incontinent of bowel. Johnson cath patent. Pt given immodium x 1 per request.   ABNL LAB/BG: , 237  DRAIN/DEVICES: Midline placed in RUE. PIV to LUE. Kept PIV just in case for any additional interventions.   TELEMETRY RHYTHM: NA   SKIN: Pale, blanchable redness to coccyx - mepilex CDI. Scars, Missing toes to R foot.   TESTS/PROCEDURES: NA   D/C DAY/GOALS/PLACE: Discharge possibly tomorrow pending ID recs and placement, Home vs. Nursing Home(?)   OTHER IMPORTANT INFO: Pt's  was rude to day shift staff again. Nurse  Manager aware.  called trent and admitted he has been rude to staff, and had some questions about discharge/at home antibiotics.  is also concerned that pt is not at her cognitive baseline, based on a discussion he had with her today. Pt was A&Ox4 during shift with neuros intact. Left message for Amber to call in AM after 11:30am.

## 2019-11-11 NOTE — PROGRESS NOTES
This is a recent snapshot of the patient's [facility] medical record.  For current drug dose and complete information and questions, call [phone] or In Basket pool, fv home infusion ([id number])  CSN Number:  [id number]

## 2019-11-12 NOTE — PROGRESS NOTES
"SUBJECTIVE/OBJECTIVE:                Gem Jade is a 73 year old female called for a transitions of care visit.  She was discharged from [facility] on 11/8/19 for catheter-associated UTI.     Chief Complaint: Hospital follow-up.    Tobacco:  reports that she quit smoking about 32 years ago. She quit after 17.00 years of use. She has never used smokeless tobacco.  Alcohol: not currently using    Medication Adherence/Access:  no issues reported  Patient uses pill box(es) set up by [facility].    Catheter-associated UTI: currently taking cefazolin 2g Q12 hours, able to be set up with  home infusion and infusions are going well. Diarrhea has restarted with antibiotics, 3-4 BM per day \"sometimes continuous\".  Wondering if she can take Lomotil again. Had Immodium in the hospital which was very effective.    Hypertension: Current medications include amlodipine 10mg daily, labetalol 300mg BID (dose increase), prazosin 2mg (primarily for nightmares). Patient continues to have some dizziness. Has not checked any home BP readings.   BP Readings from Last 3 Encounters:   11/08/19 (!) 149/54   08/28/19 120/60   08/09/19 135/63     Diabetic Neuropathy: Currently taking Gabapentin 100mg TID (reduced from 300mg BID).  Since decreasing the dose, pain has been much worse in her feet and legs.  No longer having any concerns of altered mental status, delirium seems to have resolved.  -Pt was previously on amitriptyline 50mg daily and oxcarbazepine 600mg bid but discontinued due to multiple hospitalizations for encephalopathy.    Diabetes:  Pt currently taking Lantus 20 units at bedtime and Novolog sliding scale. At dinner she is using 10 units base and adding sliding scale. Pt is not experiencing side effects.    Sliding scale:   140-199 2u  200-249 4u  250-299 6u  300-349 8u  350-399 10u  Over 400 12u   SMBG: 3-4 times a day. Ranges (pt reported):   Higher lately, all in 200s.  Recent symptoms of low blood sugar? no  Recent " symptoms of high blood sugar? No  Eye exam: due  Foot exam: due  ACEi/ARB: No due to history of elevated Cr   Aspirin: Taking 81mg daily and denies side effects  Urine Albumin:   Lab Results   Component Value Date    MICROL 1,850 04/26/2017   Diet: has been eating breakfast consistently at home but bland diet with diarrhea.  Has been eating more rice.  Lab Results   Component Value Date    A1C 5.8 11/01/2019    A1C 6.8 08/07/2019    A1C 6.2 04/24/2019    A1C 7.3 02/28/2019    A1C 8.2 09/16/2018     Bladder spasm: unaware that she was supposed to reduce the baclofen. She has 10mg pills at home and typically takes 10mg prior to catheter changes which has been helpful.     Today's Vitals: There were no vitals taken for this visit. - phone call    ASSESSMENT:                 Medication Adherence: excellent, no issues identified    Catheter-associated UTI: improved. Likely diarrhea is side effect, as she commonly has abx-associated diarrhea and is not having symptoms concerning for C diff. OK to try Lomotil x1 tonight and follow-up with PCP tomorrow morning on recommendations for ongoing use.    Hypertension: Unable to assess efficacy without any home BP readings; will await BP check tomorrow with PCP. Continue labetalol at higher dose.    Diabetic neuropathy: needs improvement. Encephalopathy has cleared and creatinine stabilized. For improved quality of life, benefit seems to outweigh risk of continuing reduced dose of gabapentin. Pt has been using cautiously and does not seem to be related to development of recurrent encephalopathy. Consider increasing dose back to usual 300mg BID     Diabetes: needs improvement. A1c at goal <8%. BS control has worsened with infection and increased rice intake. No dose changes; encouraged pt to continue with current doses of insulins and call clinic if BS further increase >200s.     Bladder spasm: improved with baclofen. Pt agreeable to trial lower dose of baclofen to determine  efficacy with next catheter change.     PLAN:                Post Discharge Medication Reconciliation Status: discharge medications reconciled, continue medications without change.    1. Trial lomotil x1 tonight  2. Lab recheck due, if SCr is stable consider increasing gabapentin back to 300mg BID - sent to PCP to address    I spent 30 minutes with this patient today. All changes were made via collaborative practice agreement with Marivel Barcenas. A copy of the visit note was provided to the patient's primary care provider.    Will follow up in 1 month.    The patient was sent via [place] a summary of these recommendations as an after visit summary.    Conchita Toledo, PharmD, BCACP

## 2019-11-13 NOTE — PATIENT INSTRUCTIONS
We have increased your gabapentin back to 300mg 2x daily and a extra 100mg as needed    We will maintain your labetalol at 300mg 2x daily and monitor your BP, we will ask your [facility] nurse to send in BP readings weekly     Please increase your probiotics to 3x daily     We have ordered some labs today    We will follow up on your infusion bill     We will hold your gentamicin flushes until you finish your IV antibiotic and then start them back at 3x a week      Maintain your current insulin dosing

## 2019-11-13 NOTE — TELEPHONE ENCOUNTER
Please see care coordination note for further follow up.     Porsha Khalil, SEKOU  [facility]-Care Coordination  [facility]  [facility]  [phone]

## 2019-11-13 NOTE — PROGRESS NOTES
SUBJECTIVE:                                                    Gem Jade is a 73 year old female who is seen in the home due to homebound status, recurrent UTI's,  for the following health issues:      Hospital Follow-up Visit:    Hospital/Nursing Home/IP Rehab Facility: [facility]  Date of Admission: 10/31/19  Date of Discharge: 11/8/19  Reason(s) for Admission: UTI, altered mental status         Problems adhering to non-medication therapy:  None    Summary of hospitalization:  [facility] discharge summary reviewed  Diagnostic Tests/Treatments reviewed.  Follow up needed: CMP,  Other Healthcare Providers Involved in Patient s Care:         Homecare  Update since discharge: stable. Neuropathy out of control with gabapentin reduction, BP meds increase to 300mg of labetalol, BP today 156/70,  taking 20u of lantus and 10u of novolog, with sliding scale  sugars have been checked 3x daily, 120-250, depends on her diet, diarrhea has increased, taking 2 probiotics a day, started lomotil with good results, taking about 1x daily, stools now very soft, nightmares under control with prazocin, no confusion,        Post Discharge Medication Reconciliation: discharge medications reconciled and changed, per note/orders (see AVS).  Plan of care communicated with patient, family and caregiver     Coding guidelines for this visit:  Type of Medical   Decision Making Face-to-Face Visit       within 7 Days of discharge Face-to-Face Visit        within 14 days of discharge   Moderate Complexity [id number]   High Complexity [id number]          Gem Jade is a 73 year old female with history of recurrent cathter associated UTI (last admitted in 8/2019), CKD with hx of renal transplant, type II DM, HTN, hyperlipidemia, bedbound admitted due to altered mental status and UTI     Acute encephalopathy likely multifactorial - improved.  Delirium during hospitalization improved  Patient having recurrent  encephalopathy in the setting of UTIs.  Prior to admission on Neurontin and baclofen. Multiple admissions since the beginning of this year.   CT head no acute pathology. Liver function test within normal limit.  TSH normal in July 2019.  Vitamin B12 normal in June 2019.    Folic acid 8.0.  CPK 76, CRP less than 2.9.  Followed by psychiatry 11/2 for delirium during hospitalization, recommend PRN Seroquel 50 mg 3 times daily for paranoia and anxiety.  Delirium resolved, has not needed any Seroquel in the last 3 days.    Treated urinary tract infection with which mental status improved and is alert and oriented x3.  PTA Neurontin and baclofen, tramadol was on hold since admission, restarted PTA Neurontin and baclofen at low dose 11/5.  Consider cognitive evaluation as outpatient.  Followed by PT, OT and speech therapy this stay.     Catheter associated UTI recurrent.  Staph aureus, citrobacter bacteremia - cleared   Afebrile on admission.  WBC, lactic acid within normal limits. Chest x-ray no acute pathology.   UA + nitrites, LE, >180 WBC.   Urine cultures with Citrobacter, MSSA  Blood cultures 10/31 Citrobacter and MSSA.  Repeat blood cultures 11/1, 11/2, 11/3 no growth to date.  Catheter last changed at home on 10/30.  Catheter K.  Was on ceftriaxone, vancomycin.  Switched to Zosyn on 11/4 till 11/8 and then switch to Ancef for 3 weeks per ID.  Repeat UA with urine cultures after completing course of antibiotic.   Home RN services requested.     Acute kidney injury  Hx of renal transplant and CKD-III-IV  Most recent creatinine range 2.2-2.4. Renal transplant in 1999. Living donor.   History of recurrent urinary tract infections, acute on chronic kidney injury since the beginning of this year.   Creatinine improved from 2.69 to 2.39  > 2.76 >2.6  Nephrology followed, started on bicarbonate for renal tubular acidosis. Recommend to follow-up with primary nephrologist at [place].    Follow-up renal function in 5 days  or unclear if symptomatic.  Continue with PTA Prednisone and Tacrolimus  Avoid NSAIDs/nephrotoxins     Type II DM with diabetic retinopathy, neuropathy, gastroparesis   Hemoglobin A1c 5.8.  Continue PTA Lantus at 20 units at bedtime  Continue PTA aspart at at lower dose of 5 units with meals.  [PTA aspart 10 units]  Continue Insulin sliding scale insulin.  Monitor blood sugars at home and review on provider visit     HTN  Hyperlipidemia:  PTA labetalol dose increased this stay from 150 mg twice daily to 300 mg twice daily.  Continue PTA amlodipine  Continue PTA prazosin.  Continue PTA aspirin, pravastatin.  Monitor blood pressure at home and review on provider visit.     Hypothyroidism:  Continue PTA levothyroxine     GERD.  Continue PTA famotidine     History of diabetic neuropathy and back pain.   Continue PTA Neurontin at lower dose of 100 mg 3 times daily (11/5).  Continue PTA baclofen at lower dose of 2.5 mg 3 times a day (11/5) as needed for bladder spasms.  Hold above meds if drowsy.     History of provoked bilateral PE:   Not on anticoagulation. Follow with PCP     History of paroxysmal atrial fibrillation.  Not on anticoagulation prior to admission.  Continue PTA aspirin.     Obstructive sleep apnea on CPAP:   Continue CPAP as per home settings     History of seizure disorder.  On review of chart prior to admission not on seizure medication.     Obesity with a BMI of 33.53.  Will need to consider lifestyle modification with diet and exercise as able to.     Physical deconditioning from acute illness, chronic debility.  Patient lives at home, bedbound.  Admitted multiple times since the beginning of this year for acute encephalopathy in the setting of recurrent UTIs.  PT, OT recommend to return home with spouse.       Magalis Jay MD,        Problems taking medications regularly: No    Medication side effects: none    Diet: diabetic    Social History     Tobacco Use     Smoking status: Former Smoker      Years: 17.00     Last attempt to quit: 1987     Years since quittin.4     Smokeless tobacco: Never Used   Substance Use Topics     Alcohol use: No        Problem list and histories reviewed & adjusted, as indicated.  Additional history: as documented    Patient Active Problem List   Diagnosis     Kidney replaced by transplant     Primary localized osteoarthrosis, lower leg     Pulmonary embolism and infarction (H)     Diabetes mellitus with background retinopathy (H)     Hyperlipidemia LDL goal <100     Diabetic gastroparesis (H)     Back pain     Compression fx, thoracic spine (H)     Anemia     Clostridium difficile diarrhea     Hydronephrosis     Recurrent UTI     Benign essential hypertension     Vancomycin resistant Enterococcus     Clostridium difficile colitis     Diabetic polyneuropathy associated with type 2 diabetes mellitus (H)     Chronic shoulder pain, unspecified laterality     Morbid obesity, unspecified obesity type (H)     Health Care Home     Paroxysmal A-fib (H)     Acute kidney injury (H)     Pancytopenia, acquired (H)     Obstructive sleep apnea syndrome     Complicated UTI (urinary tract infection)     Altered Mental Status, Probable Metabolic encephalopathy     Chronic indwelling Johnson catheter     Seizure disorder (H)     Dermatitis     Encephalopathy acute     Type 2 diabetes mellitus with diabetic polyneuropathy, with long-term current use of insulin (H)     Sepsis (H)     Overdose, accidental or unintentional, subsequent encounter     CKD (chronic kidney disease) stage 3, GFR 30-59 ml/min (H)     Paranoia (H)     Altered mental status     UTI (urinary tract infection)     Past Surgical History:   Procedure Laterality Date     AMPUTATE TOE(S) Right 2019    Procedure: RIGHT FIRST TOE AMPUTATION;  Surgeon: Armen Jefferson DPM;  Location:  OR     C NONSPECIFIC PROCEDURE  10/99    kidney transplant     C NONSPECIFIC PROCEDURE      MRI head, lumbar spine, pelvis     C  TOTAL KNEE ARTHROPLASTY  3/03    Knee Replacement, Total right, post op PE     CHOLECYSTECTOMY       HC LEFT HEART CATHETERIZATION      Cardiac Cath, Left Heart, Negative  (@ [facility])     HC LEFT HEART CATHETERIZATION      normal coronary angiogram at [facility], had mild troponin rise (0.71)     HC REMV CATARACT EXTRACAP,INSERT LENS      bilateral cataract repaired, left eye        Social History     Tobacco Use     Smoking status: Former Smoker     Years: 17.00     Last attempt to quit: 1987     Years since quittin.4     Smokeless tobacco: Never Used   Substance Use Topics     Alcohol use: No     Family History   Problem Relation Age of Onset     Diabetes Mother      Parkinsonism Mother      Diabetes Brother      Hypertension Father         [id number] pneumonia     Heart Disease Father            Current Outpatient Medications   Medication Sig Dispense Refill     acetaminophen (TYLENOL) 325 MG tablet Take 325-650 mg by mouth every 4 hours as needed        amLODIPine (NORVASC) 5 MG tablet TAKE TWO TABLETS (10MG) BY MOUTH DAILY 60 tablet 2     aspirin 81 MG tablet Take 81 mg by mouth daily       baclofen (LIORESAL) 10 MG tablet Take 0.25-0.5 tablets (2.5-5 mg) by mouth 2 times daily as needed for other (Bladder Spasms) 60 tablet 3     blood glucose (NO BRAND SPECIFIED) lancets standard Use to test blood sugar 3 times daily or as directed. (Accuchek guide) 300 each 3     blood glucose (NO BRAND SPECIFIED) test strip Use to test blood sugar 3 times daily or as directed. (accuchek guide) 300 strip 3     ceFAZolin (ANCEF) 1 GM vial Inject 2 g into the vein every 12 hours for 21 days CBC with differential, creatinine, SGOT weekly while on this medication to be faxed to Dr. Olmedo office.  0     COMFORT EZ PEN NEEDLES 31G X 6 MM miscellaneous USE 4 DAILY OR AS DIRECTED 100 each 3     CRANBERRY PO Take 1 tablet by mouth 2 times daily        cyanocobalamin (CYANOCOBALAMIN) 1000 MCG/ML injection Inject 1 mL  (1,000 mcg) into the muscle every 2 months 3 mL 3     diphenoxylate-atropine (LOMOTIL) 2.5-0.025 MG tablet Take 1-2 tablets by mouth 4 times daily as needed for diarrhea 360 tablet 0     famotidine (PEPCID) 20 MG tablet Take 1 tablet (20 mg) by mouth 2 times daily 60 tablet 1     gabapentin (NEURONTIN) 100 MG capsule Take 1 capsule (100 mg) by mouth 3 times daily 30 capsule 0     gentamicin irrigation Irrigate rico cath weekly 60ml of 400mg of gentamicinin [id number] of 0.9% Sodium Chloride 1 Bottle 1     insulin aspart (NOVOLOG FLEXPEN) 100 UNIT/ML pen Take 10 units with meals + sliding scale if eating. Sliding scale only at bedtime when not eating. Max 25 units per day. Hold if pre-meal glucose is <90  Sliding scale:   140-199 2u  200-249 4u  250-299 6u  300-349 8u  350-399 10u  Over 400 12u 45 mL 3     insulin glargine (LANTUS SOLOSTAR) 100 UNIT/ML pen Inject 20 Units Subcutaneous At Bedtime 30 mL 3     labetalol (NORMODYNE) 300 MG tablet Take 1 tablet (300 mg) by mouth 2 times daily 30 tablet 0     Lactobacillus Rhamnosus, GG, (CULTURELL) capsule Take 1 capsule by mouth 2 times daily 60 capsule 11     levothyroxine (SYNTHROID/LEVOTHROID) 50 MCG tablet TAKE ONE TABLET BY MOUTH EVERY DAY 90 tablet 0     lidocaine (XYLOCAINE) 2 % external gel Apply topically 3 times daily (Patient taking differently: Apply topically 3 times daily as needed ) 85 mL 11     loratadine (CLARITIN) 10 MG tablet Take 1 tablet (10 mg) by mouth daily 90 tablet 3     menthol-zinc oxide (CALMOSEPTINE) 0.44-20.625 % OINT ointment Apply topically 4 times daily as needed for skin protection       ondansetron (ZOFRAN ODT) 4 MG ODT tab Take 1 tablet (4 mg) by mouth daily as needed for nausea 30 minutes before getting up in your wheelchair 20 tablet 1     order for DME Equipment being ordered: [employer] Mob[employer] System, Titan 500, free standing overhead patient lift    Please send to  DME 1 Device 0     order for DME Equipment being ordered: CPAP  "mask, [employer]Med Ahuja FX, nasal pillows system, women's,   Catalog # [id number]    Please send to  DME 1 Device 3     order for DME Equipment being ordered: Tegaderm 4x4   MICHELLE 6 months  Sacral wound 1 Box 3     order for DME Equipment being ordered: Phantom Iman gel-T cushion 20 inches wide & 18 inches deep    height 1.676 m (5' 6\"), weight 101.6 kg (223 lb 15.8 oz) 1 each 0     pravastatin (PRAVACHOL) 10 MG tablet Take 1 tablet (10 mg) by mouth daily 90 tablet 3     prazosin (MINIPRESS) 1 MG capsule Take 2 capsules (2 mg) by mouth At Bedtime 60 capsule 1     predniSONE (DELTASONE) 5 MG tablet Take 1 tablet (5 mg) by mouth daily 90 tablet 3     simethicone (MYLICON) 125 MG CHEW Take 1 tablet (125 mg) by mouth as needed for intestinal gas 120 tablet      sodium bicarbonate 650 MG tablet Take 1 tablet (650 mg) by mouth daily 30 tablet 0     tacrolimus (GENERIC EQUIVALENT) 1 MG capsule Take 2 capsules (2 mg) by mouth 2 times daily 120 capsule 11     vitamin D3 (CHOLECALCIFEROL) 2000 units (50 mcg) tablet Take 2,000 Units by mouth 2 times daily        Allergies   Allergen Reactions     Atorvastatin Calcium      myalgias, muscle aches     Codeine Nausea and Vomiting     Darvocet [Propoxyphene N-Apap] Nausea and Vomiting     Hydromorphone      Levofloxacin Other (See Comments) and Diarrhea     hallucinations     Morphine      Nausea and vomiting     Niaspan [Niacin] GI Disturbance     Flushing even at low dose, GI upset     Percocet [Oxycodone-Acetaminophen]      Vomiting after taking med couple of times     Vicodin [Hydrocodone-Acetaminophen] Nausea and Vomiting     Recent Labs   Lab Test 11/08/19  0733 11/07/19  0743  11/05/19  0816  11/01/19  0920 10/31/19  1404  08/07/19  0855  07/19/19  0832  07/15/19  1546 07/01/19  1208  06/03/19  0730  04/24/19  1258  12/05/16  1210  02/05/15  1250  05/16/13  1240   A1C  --   --   --   --   --  5.8*  --   --  6.8*  --   --   --   --   --   --   --   --  6.2*   < > 7.4*   < > 6.9*   < > " 7.2*   LDL  --   --   --   --   --   --   --   --   --   --   --   --   --   --   --   --   --   --   --  64  --  18  --  149*   HDL  --   --   --   --   --   --   --   --   --   --   --   --   --   --   --   --   --   --   --  46*  --  43*  --  42*   TRIG  --   --   --   --   --   --   --   --   --   --   --   --   --   --   --   --   --   --   --  272*  --  223*  --  262*   ALT  --   --   --   --   --   --  16  --   --   --  11  --  18  --    < >  --    < > 22   < >  --    < >  --    < >  --    CR 2.60* 2.74*   < > 2.39*   < > 2.50* 2.69*   < > 2.07*   < > 1.53*   < > 1.98* 1.68*   < >  --    < > 1.78*   < >  --    < > 0.67   < > 0.70   GFRESTIMATED 18* 17*   < > 19*   < > 18* 17*   < > 23*   < > 33*   < > 25* 30*   < >  --    < > 28*   < >  --    < > 87   < > 84   GFRESTBLACK 20* 19*   < > 23*   < > 21* 20*   < > 27*   < > 39*   < > 28* 35*   < >  --    < > 32*   < >  --    < > >90   GFR Calc     < > >90   POTASSIUM 4.6  --   --  4.4   < > 4.1 4.6   < > 4.5   < > 4.1   < > 4.8 4.5   < >  --    < > 4.5   < >  --    < > 4.3   < > 3.9   TSH  --   --   --   --   --   --   --   --   --   --   --   --   --  2.43  --  1.28  --   --    < >  --    < >  --    < >  --     < > = values in this interval not displayed.      BP Readings from Last 3 Encounters:   11/08/19 (!) 149/54   08/28/19 120/60   08/09/19 135/63    Wt Readings from Last 3 Encounters:   11/01/19 103 kg (227 lb 1.2 oz)   08/28/19 103.9 kg (229 lb)   07/21/19 107.9 kg (237 lb 14 oz)          Labs reviewed in EPIC  Problem list, Medication list, Allergies, and Medical/Social/Surgical histories reviewed in [facility] and updated as appropriate      ROS: Constitutional, neuro, ENT, endocrine, pulmonary, cardiac, gastrointestinal, genitourinary, musculoskeletal, integument and psychiatric systems are negative, except as otherwise noted above in the HPI.    OBJECTIVE:                                                    BP (!) 156/70   Pulse 68   SpO2  97%   There is no height or weight on file to calculate BMI.    GENERAL: alert, mild distress, elderly and engaged in visit   RESP: lungs clear to auscultation - no rales, rhonchi or wheezes  CV: regular rate and rhythm, normal S1 S2, no S3 or S4, no murmur, click or rub, mild edema in LE  ABDOMEN: soft, nontender and bowel sounds normal  MS: muscle wasting in UE and LE, decreased range of motion in shoulders,  and generalized weakness  SKIN: no suspicious lesions or rashes and bruising on arms from blood draws, PICC line in place, right upper arm, but tender, pain with palpation,     Mental Status Assessment:  Appearance:   Appropriate   Eye Contact:   Fair   Psychomotor Behavior: Normal   Attitude:   Cooperative   Orientation:   All  Speech   Rate / Production: Normal    Volume:  Soft   Mood:    Anxious  Normal  Affect:    Appropriate  Labile   Thought Content:  Clear  Rumination   Thought Form:  Coherent  Goal Directed   Insight:    Fair   Attention Span and Concentration:  limited  Recent and Remote Memory:  intact  Fund of Knowledge: appropriate  Muscle Strength and Tone: normal   Suicidal Ideation: reports no thoughts, no intention  Hallucination: No  Paranoid-No  Manic-No  Panic-No  Self harm-No    Diagnostic Test Results:  No results found for any visits on 11/13/19.     ASSESSMENT/PLAN:                                                    ASSESSMENT / PLAN:  (Z09) Hospital discharge follow-up  (primary encounter diagnosis)  Comment: Stable, increase in neuropathy with decreased dose  Plan: lactobacillus rhamnosus, GG, (CULTURELL)         capsule, **Comprehensive metabolic panel FUTURE        2mo, CANCELED: Comprehensive metabolic panel         (BMP + Alb, Alk Phos, ALT, AST, Total. Bili,         TP)        Labs ordered, increase gabapentin to PTA dose     (Z46.6) Urinary catheter (Johnson) change required  Comment: only using baclofen with cath changes   Plan: baclofen (LIORESAL) 10 MG tablet        Changed  order to reflect how the patient is using the baclofen     (E11.42) Diabetic polyneuropathy associated with type 2 diabetes mellitus (H)  Comment: worse   Plan: gabapentin (NEURONTIN) 100 MG capsule        Will increase gabapentin to 300mg 2x daily with an as needed 100mg at bedtime, do not feel this is contributing to the confusion       (I10) Benign essential hypertension  Comment: still high systolic,   Plan: will maintain increase labetalol and monitor BP with [facility], in general BP's drop once she is back home     (E11.65,  Z79.4) Type 2 diabetes mellitus with hyperglycemia, with long-term current use of insulin (H)  Comment: sugars still high frequently   Plan: continue lantus and novolog, discussed diet      (N39.0) Recurrent UTI  Comment: chronic, recent start of gentamicin flushes but only a couple of doses before back in the hospital for an infection   Plan: lactobacillus rhamnosus, GG, (CULTURELL)         capsule, gentamicin irrigation        Will hold gentamicin flushes until completing current antibiotics then will increase to 3x weekly flushes     (R19.7) Diarrhea  Comment: improved with Lomotil  Plan: will continue lomotil one tab daily while on antibiotics     (E55.9) Vitamin D deficiency  Comment: hx of, taking 4,000iu daily  Plan: **Vitamin D Deficiency FUTURE 2mo, CANCELED:         Vitamin D Deficiency        Lab ordered, continue meds     Patient Instructions   We have increased your gabapentin back to 300mg 2x daily and a extra 100mg as needed at bedtime    We will maintain your labetalol at 300mg 2x daily and monitor your BP, we will ask your [facility] nurse to send in BP readings weekly     Please increase your probiotics to 3x daily     We have ordered some labs today    We will follow up on your infusion bill     We will hold your gentamicin flushes until you finish your IV antibiotic and then start them back at 3x a week      Maintain your current insulin dosing         Visit time 60   min with >  than 50% of the time spent in counseling on: hospital discharge, neuropathy, DM-2, recurrent UTI, HTN, diarrhea,       ANTHONY Antonio CNP  [facility] PRIMARY [facility]

## 2019-11-13 NOTE — PROGRESS NOTES
Clinic Care Coordination Contact  Care Team Conversations    Received late notification of patient's discharge from hospital 11-8-19. Called patient.  answered. Patient sleeping. Will attempt again later. He agrees with plan    Porsha Khalil RN  [facility]-Care Coordination  [facility]  [facility]  [phone]

## 2019-11-13 NOTE — Clinical Note
Please let her HC nurse know that labs are ordered and we are increasing her gentamicin flushes to 3x a week

## 2019-11-14 NOTE — TELEPHONE ENCOUNTER
Marivel Barcenas, APRN CNP  [facility] All Nurse Pool             Please let her [facility] nurse know that labs are ordered and we are increasing her gentamicin flushes to 3x a week      Called Berenice, relayed provider message above, Berenice verbalized understanding    Thank You!  Della Osborne, RN  Triage Nurse

## 2019-11-14 NOTE — TELEPHONE ENCOUNTER
DENNIS Rudolph: patient's PICC line fell out.     Homecare has a call out to patient ID doctors to determine if oral medications should be started or if PICC line will need to be reinserted    Thank You!  Della Osborne, SEKOU  Triage Nurse

## 2019-11-15 NOTE — PROVIDER NOTIFICATION
MD Notification    Notified Person: MD    Notified Person Name: Dr. Olmedo    Notification Date/Time: 14:40    Notification Interaction: phone    Purpose of Notification: pt presented for midline placement with orders from Dr. Mckenzie. No transportation ride home was scheduled. [facility] and next [facility] is not until 17:00. Pt diabetic, has not eaten today and has not taken any of her meds. Need orders/     Orders Received: regular diet, no need for PTA meds during stay    Comments:
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
## 2019-11-15 NOTE — PROVIDER NOTIFICATION
Midline placed. Blood sugar 150. Another RN on unit recognized pt, had discharged pt last week and had no concerns regarding pt mentation. RN assessed pt and pt appears WNL, no concerns for suicidal ideation. [facility] transportation arranged for 17:00.

## 2019-11-15 NOTE — PROGRESS NOTES
This is a recent snapshot of the patient's [facility] medical record.  For current drug dose and complete information and questions, call [phone] or In Basket pool, [facility] home infusion ([id number])  CSN Number:  [id number]

## 2019-11-15 NOTE — PROGRESS NOTES
Clinic Care Coordination Contact  Care Team Conversations    Attempted to contact patient for hospital follow up but patient is re-admitted to hospital currently. Will follow up with patient upon notification of discharge.     Porsha Khalil RN  [facility]-Care Coordination  [facility]  [facility]  [phone]

## 2019-11-15 NOTE — PROGRESS NOTES
Pt admitted to observation for midline placement.  Patient previously had right upper arm midline that fell out.  Patient initially requesting line to be placed on left arm if possible.  Attempted left basilic vein on left and able to access vein but unable to thread wire.  Then moved to left medial brachial vein and when vein accessed patient stated she had sharp pain down arm.  Needle immediately removed and patient stated pain subsided.  Second IV nurse able to place midline in right upper arm.
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
## 2019-11-18 NOTE — PROGRESS NOTES
This is a recent snapshot of the patient's [facility] medical record.  For current drug dose and complete information and questions, call [phone] or In Basket pool, fv home infusion ([id number])  CSN Number:  [id number]

## 2019-11-18 NOTE — PROGRESS NOTES
This is a recent snapshot of the patient's [facility] medical record.  For current drug dose and complete information and questions, call [phone] or In Basket pool, fv home infusion ([id number])  CSN Number:  [id number]

## 2019-11-18 NOTE — TELEPHONE ENCOUNTER
Has had 2 failed pick-lines & now is  needing a picline. She wanted PCP to know.  She needed re-assurance & is concerned about the costs that are building. Pt also spoke to [facility] of Life decisions if she has to keep going through these procedures & having them fail. She and her  are not it agreement on this & wanted her PCP to be aware.     Marivel Klein RN

## 2019-11-18 NOTE — TELEPHONE ENCOUNTER
Reason for Call: call back    Detailed comments: Patient would like a call back asap, she would like to speak to her provider before her picline gets set up    Phone Number Patient can be reached at: Cell number on file:    Telephone Information:   Mobile [phone]       Best Time: Anytime    Can we leave a detailed message on this number? YES    Call taken on 11/18/2019 at 1:06 PM by Nanda Horan

## 2019-11-19 NOTE — PROGRESS NOTES
This is a recent snapshot of the patient's [facility] medical record.  For current drug dose and complete information and questions, call [phone] or In [facility] pool, fv home infusion ([id number])  CSN Number:  [id number]

## 2019-11-19 NOTE — PROGRESS NOTES
Jeff's test performed prior to left radial ABG draw. Collateral circulation confirmed.     Eunice Santamaria, RT

## 2019-11-19 NOTE — ED TRIAGE NOTES
Pt arrives to ED from being outpatient on observation unit where she was admitted for placement of a PICC line. Pt went unresponsive before procedure could begin. Pt received 0.2 of narcan during RRT. Per hospitalist pt's lactate was normal, BG normal, afebrile.     Pt has Infusing 20 IV PTA.

## 2019-11-19 NOTE — PROGRESS NOTES
Responded to a rapid response call concerning the patient LOC.  Patient was unresponsive, breathing on RA, Sp02 95%.  Arterial blood gas was collected and sent to lab per the MD.      Eunice Santamaria, RT

## 2019-11-19 NOTE — PROCEDURES
[facility]    Procedure: RUE PICC  Date/Time: 11/19/2019 4:00 PM  Performed by: Julio C Finley MD  Authorized by: Julio C Finley MD     UNIVERSAL PROTOCOL   Site Marked: Yes  Prior Images Obtained and Reviewed:  Yes  Required items: Required blood products, implants, devices and special equipment available    Patient identity confirmed:  Verbally with patient  Patient was reevaluated immediately before administering moderate or deep sedation or anesthesia  Confirmation Checklist:  Patient's identity using two indicators, relevant allergies, procedure was appropriate and matched the consent or emergent situation and correct equipment/implants were available  Time out: Immediately prior to the procedure a time out was called    Preparation: Patient was prepped and draped in usual sterile fashion    ESBL (mL):  4     ANESTHESIA    Local Anesthetic: Lidocaine 1% without epinephrine      SEDATION    Patient Sedated: No    See dictated procedure note for full details.  Findings: RUE single lumen 4 Fr Power PICC in Brachial Vein (only patent vein in upper arm).  Tip at RA/SVC junction.  Aspirated and flushed with normal saline.    Specimens: none    Complications: None    Condition: Stable    PROCEDURE   Patient Tolerance:  Patient tolerated the procedure well with no immediate complications    Length of time physician/provider present for 1:1 monitoring during sedation: 0

## 2019-11-19 NOTE — PROGRESS NOTES
"Pt. Arrived with  for picc line placement. Pt's  very upset with midlines due becoming occluded and unable to use when at home. Pt's  upset with nurses on IV Team due to the midlines not working and \"falling out\".  Call out to Dr. Mckenzie for pt. To go to IR for picc placement due to the difficulty the IV team has had placing a line in the upper arms. Unable to pass wire in left basilic vein and able to access basilic and pass wire but midlines are not lasting and become ? Kinked.  "

## 2019-11-19 NOTE — ED PROVIDER NOTES
History     Chief Complaint:  Altered Mental Status      The history is provided by the patient, the spouse and medical records. The history is limited by the condition of the patient.      Gem Jade is a 74 year old bed-bound female since 2011 with a history of hypertension, hyperlipidemia, PE, diabetes type 2, CKD, sepsis, encephalopathy, paroxysmal atrial fibrillation, recurring UTIs, VRE, MRSA, and s/p kidney transplant recipient in 1999 with chronic indwelling catheter since 2007 who presents to the emergency department with her  for evaluation of altered mental status. Per nursing report, the patient was being seen by infectious disease for a PICC line placement when she became unresponsive and was barely moving extremities. She was then given 0.2mg of Narcan prior to being sent to the ED which made her slightly more responsive. Her , who is her primary care taker, reports that the patient is admitted to the hospital every 3-4 months for recurrent UTIs. Most recently, she was admitted from 10/31/11/8/2019 for encephalopathy and a catheter associated UTI. She was started on Ancef outpatient via midline IV. However, the IV has fallen out twice per the  and has not been functional. Therefore, today they came to infectious disease to have a PICC line placed but was unsuccessful. He also states that she is very confused today which is very typical of her UTI symptoms. Her temperature typically runs around 97. The  also notes the patient has severe vertigo and is only able to roll slightly to the right side, any other movement induces vomiting. The patient was able to indicate pain, but was not able to specify. She further denies chest pain, abdominal pain, nausea, or opioid use. She is not on blood thinners.     She was previously on Keppra for suspicion of seizures given her frequent episodes of altered mental status, but no evidence of this was found so it was  "discontinued.    Allergies:  Atorvastatin   Codeine  Darvocet  Hydromorphone  Levofloxacin  Morphine  Niaspan  Percocet  Vicodin      Medications:    Tylenol  Norvasc  Aspirin 81 mg  Baclofen  Ancef  Lomotil  Pepcid  Neurontin  Novolog flexpen  Lantus solostar  Normodyne  Levothyroxine   Xylocaine  Claritin  Calmoseptine  Zofran  Pravachol  Minipress  Deltasone  Mylicon     Past Medical History:    Diabetic gastroparesis  Dizziness  Hyponatremia  Hypertension  Obesity  Osteopenia  Hyperlipidemia  PE  Polyneuropathy   Osteoarthrosis  Pyelonephritis  Diabetes type 2  Recurring UTI  Altered mental status  CKD  Paranoia  Overdose  Sepsis  Encephalopathy  Dermatitis  ROB  Pancytopenia  ALLAN  Paroxysmal atrial fibrillation  C. diff  VRE  Hydronephrosis  Anemia    Past Surgical History:    Right toe amputation  Kidney transplant recipient 1999  R knee replacement  Cholecystectomy  Bilateral cataract repair     Family History:    Diabetes  Parkinsonism  Hypertension  Heart disease: father    Social History:  Presents to the ED with her   Tobacco Use: Former, quit: 6/1/1987  Alcohol Use: No  PCP: Marivel Barcenas NP  Marital Status:        Review of Systems   Unable to perform ROS: Mental status change   Cardiovascular: Negative for chest pain.   Gastrointestinal: Negative for abdominal pain and nausea.     Physical Exam     Patient Vitals for the past 24 hrs:   BP Temp Temp src Heart Rate Resp SpO2 Height Weight   11/19/19 1333 (!) 183/85 -- -- -- -- -- -- --   11/19/19 1332 -- 97.1  F (36.2  C) Tympanic -- -- -- -- --   11/19/19 1330 -- -- -- 61 12 100 % -- --   11/19/19 1327 -- -- -- 63 12 100 % -- --   11/19/19 1311 (!) 180/81 -- -- 62 16 98 % 1.676 m (5' 6\") 115.2 kg (253 lb 15.5 oz)       Physical Exam  SKIN:  Warm, dry.  No jaundice.  No rash.  HEMATOLOGIC/IMMUNOLOGIC/LYMPHATIC:  No pallor.    HENT:  Moist oral mucosa.    EYES:  Conjunctivae normal.  Pupils equal round and reactive to light.  " Normal extraocular motion.  CARDIOVASCULAR:  Regular rate and rhythm.  No murmur.  No rub.  RESPIRATORY:  No respiratory distress, breath sounds equal and normal.  GASTROINTESTINAL:  Soft, nontender abdomen.  No mass or distention.  MUSCULOSKELETAL:  Morbidly obese body habitus.  NEUROLOGIC: Somnolent, appropriately answers simple questions, no aphasia or dysarthria.  No gross motor or sensory deficit.  PSYCHIATRIC:  No apparent psychotic features.    Emergency Department Course   Laboratory:  CBC: WBC: 6.3, HGB: 8.9 (L), PLT: 155    Urine culture aerobic bacterial: In process    1327 Blood culture: In process    Emergency Department Course:  1259 Nursing notes and vitals reviewed. I performed an exam of the patient as documented above.     Blood drawn. This was sent to the lab for further testing, results above. Urine taken from rico was sent for laboratory testing, findings above.     IR paged for PICC placement.     1440  I consulted with Dr. Salgado of the hospitalist services. They are in agreement to accept the patient for admission.    Findings and plan explained to the Patient and spouse who consents to admission. Discussed the patient with Dr. Salgado, who will admit the patient to a med/surg bed for further monitoring, evaluation, and treatment.    Impression & Plan    Medical Decision Making:  This patient presents after a direct admission for concerns of altered mentation in the context of ongoing treatment for a catheter associated UTI. The patient does answer questions appropriately but clearly is drowsy. No fall or trauma. She is bed bound. Not taking anticoagulants. Her  states that her mental status change is always like this when she has a UTI. The admitting physicians prior to transfer down to the ED had ordered some testing, which I reviewed. Further testing added in the ED. The patient is hemodynamically stable. She requires IV access, specifically a PICC line for ongoing long term  antibiotics, so this is being arranged with interventional radiology. The patient will be admitted for further treatment and monitoring.     Diagnosis:    ICD-10-CM    1. Encephalopathy G93.40    2. Urinary tract infection associated with indwelling urethral catheter, initial encounter (H) T83.511A     N39.0        Disposition:  Admitted to Dr. Damian Rivers Disclosure:  I, Magdi Ibarra, am serving as a scribe on 11/19/2019 at 12:59 PM to personally document services performed by Robin Ruiz MD based on my observations and the provider's statements to me.     Magdi Ibarra  11/19/2019    EMERGENCY DEPARTMENT       Robin Llamas MD  11/19/19 181

## 2019-11-19 NOTE — CODE/RAPID RESPONSE
"[facility] NP Note  11/19/2019    I was called to evaluate Ms. Jade due to acute altered mental status during her outpatient stay for PICC line placement.  Apparently, according to her  the patient was admitted as an outpatient (in overflow L&D) for placement of PICC line.  The PICC line was unable to be placed, recommended for IR placement.  During this time, nursing became increasingly worried as patient was more more unresponsive.  Patient's  reports she was \" tired\" this morning and he allowed her to sleep until the ambulance arrived to bring her in for PICC placement.    On my initial arrival, the patient is lying in a semiupright position, eyes closed, unresponsive to voice.  To deep sternal rub she awakens and slightly moves all 4 extremities.  She is a poor respiratory effort, shallow respirations.  Vitals show BP 160s/90s, heart rate 60s A. fib, 98% on room air, respiratory rate ~12, skin is warm and dry, abdomen obese but soft, nontender; missing multiple toes, but no significant dependent edema.  There is a Johnson in place with light yellow urine, with minimal output.  She did follow commands, however was unable to speak or respond verbally.    Of note, the patient's  is at the bedside, quite upset due to her rapidly changing/deteriorating status.  Security was called and present with .    DDX:  She has a known diabetic, glucose obtained - 163.  Given the patient's long history of urosepsis and bacteremia, blood cultures were obtained (1 set due to difficult venous access), lactic acid resulting 0.8, rectal temp 97  F, and remainder of pending labs are unable to be obtained due to difficult venous access.  An ABG was obtained to look for hypercarbic respiratory failure.  I did not feel the patient was a BiPAP candidate given her body habitus and unresponsiveness.    Patient does have a seizure history documented in her epic chart, however  relays that this is inaccurate, " and has never been on any antiseizure meds.  There is no significant medication changes, however the patient was experiencing increased pain the evening prior to her PEG placement resulting in her  giving her an extra baclofen.  Patient was treated with 0.2 mg Narcan, with very minimal improvement in her responsiveness. Presumably the patient is encephalopathic from her sepsis.  She follows with ID in the outpatient setting for her ongoing UTIs.      Given her outpatient status, the work-up was unable to be finished in the inpatient setting. The patient was transferred to ED for further work-up and management prior to obtaining CT head without contrast.      I provided a brief bedside signout to Dr. Llamas in the stabilization room 1.      ANTHONY Mendez [facility]  Hospitalist Service  House Officer  Pager: [phone] (1s - 6p)

## 2019-11-19 NOTE — PHARMACY-VANCOMYCIN DOSING SERVICE
Pharmacy Vancomycin Initial Note  Date of Service 2019  Patient's  1945  74 year old, female    Indication: Sepsis    Current estimated CrCl = Estimated Creatinine Clearance: 24.6 mL/min (A) (based on SCr of 2.6 mg/dL (H)).    Creatinine for last 3 days  No results found for requested labs within last 72 hours.    Recent Vancomycin Level(s) for last 3 days  No results found for requested labs within last 72 hours.      Vancomycin IV Administrations (past 72 hours)      No vancomycin orders with administrations in past 72 hours.                Nephrotoxins and other renal medications (From now, onward)    Start     Dose/Rate Route Frequency Ordered Stop    19 1300  vancomycin (VANCOCIN) 2,000 mg in sodium chloride 0.9 % 500 mL intermittent infusion      2,000 mg  over 2 Hours Intravenous ONCE 19 1255      19 1255  vancomycin place smith - receiving intermittent dosing      1 each Intravenous SEE ADMIN INSTRUCTIONS 19 1255      19 1230  piperacillin-tazobactam (ZOSYN) 3.375 g vial to attach to  mL bag     Note to Pharmacy:  Pharmacy can adjust dose based on renal function.    3.375 g  over 1 Hours Intravenous EVERY 6 HOURS 19 1220            Contrast Orders - past 72 hours (72h ago, onward)    None                Plan:  1.  Start vancomycin  2000 mg IV X 1 dose today  2.  Goal Trough Level: 15-20 mg/L   3.  Pharmacy will check trough levels as appropriate in am tomorrow.    4. Serum creatinine levels will be ordered daily for the first week of therapy and at least twice weekly for subsequent weeks.    5. [place] method utilized to dose vancomycin therapy: Method 2    Marivel Coello [facility]

## 2019-11-19 NOTE — ED NOTES
"[facility]  ED Nurse Handoff Report    ED Chief complaint: Altered Mental Status      ED Diagnosis:   Final diagnoses:   Encephalopathy   Urinary tract infection associated with indwelling urethral catheter, initial encounter (H)       Code Status: Full Code    Allergies:   Allergies   Allergen Reactions     Atorvastatin Calcium      myalgias, muscle aches     Codeine Nausea and Vomiting     Darvocet [Propoxyphene N-Apap] Nausea and Vomiting     Hydromorphone      Levofloxacin Other (See Comments) and Diarrhea     hallucinations     Morphine      Nausea and vomiting     Niaspan [Niacin] GI Disturbance     Flushing even at low dose, GI upset     Percocet [Oxycodone-Acetaminophen]      Vomiting after taking med couple of times     Vicodin [Hydrocodone-Acetaminophen] Nausea and Vomiting       Activity level - Baseline/Home:  Total Care  Activity Level - Current:   Total Care    Patient's Preferred language: english   Needed?: No    Isolation: Yes  Infection: Not Applicable  ESBL  MRSA  VRE  Bariatric?: No    Vital Signs:   Vitals:    11/19/19 1327 11/19/19 1330 11/19/19 1332 11/19/19 1333   BP:    (!) 183/85   Resp: 12 12     Temp:   97.1  F (36.2  C)    TempSrc:   Tympanic    SpO2: 100% 100%     Weight:       Height:           Cardiac Rhythm: ,        Pain level:      Is this patient confused?: Yes   Does this patient have a guardian?  No- but  is her caregiver and is very involved         If yes, is there guardianship documents in the Epic \"Code/ACP\" activity?  N/A         Guardian Notified?  N/A  [place] - Suicide Severity Rating Scale Completed?  Yes  If yes, what color did the patient score?  White    Patient Report: Initial Complaint: 74 year old bed-bound female since 2011 with a history of hypertension, hyperlipidemia, PE, diabetes type 2, CKD, sepsis, encephalopathy, paroxysmal atrial fibrillation, recurring UTIs, VRE, MRSA, and s/p kidney transplant recipient in 1999 with " chronic indwelling catheter since 2007 who presents to the emergency department with her  for evaluation of altered mental status. Per nursing report, the patient was being seen by infectious disease for a PICC line placement when she became unresponsive and was barely moving extremities therefore sent to ED for further care  Focused Assessment: patient minimally responsive on arrival to ED, supine with minimal movement,  reports this is baseline when she has a UTI. She has begun to respond to his questions and direct questions as time has progressed here in ED. She has a functioning IV in the left lower forearm. Per  she requires total care. Has a rico in place  Tests Performed: labs were drawn during RRT prior to arrival in ED.  Abnormal Results:   Results for orders placed or performed during the hospital encounter of 11/19/19 (from the past 24 hour(s))   CBC with platelets differential   Result Value Ref Range    WBC 6.3 4.0 - 11.0 10e9/L    RBC Count 3.07 (L) 3.8 - 5.2 10e12/L    Hemoglobin 8.9 (L) 11.7 - 15.7 g/dL    Hematocrit 29.5 (L) 35.0 - 47.0 %    MCV 96 78 - 100 fl    MCH 29.0 26.5 - 33.0 pg    MCHC 30.2 (L) 31.5 - 36.5 g/dL    RDW 15.3 (H) 10.0 - 15.0 %    Platelet Count 155 150 - 450 10e9/L    Diff Method Automated Method     % Neutrophils 71.3 %    % Lymphocytes 15.7 %    % Monocytes 8.9 %    % Eosinophils 2.4 %    % Basophils 0.3 %    % Immature Granulocytes 1.4 %    Nucleated RBCs 0 0 /100    Absolute Neutrophil 4.5 1.6 - 8.3 10e9/L    Absolute Lymphocytes 1.0 0.8 - 5.3 10e9/L    Absolute Monocytes 0.6 0.0 - 1.3 10e9/L    Absolute Eosinophils 0.2 0.0 - 0.7 10e9/L    Absolute Basophils 0.0 0.0 - 0.2 10e9/L    Abs Immature Granulocytes 0.1 0 - 0.4 10e9/L    Absolute Nucleated RBC 0.0    Blood culture   Result Value Ref Range    Specimen Description Blood Left Arm     Culture Micro No growth after 1 hour      *Note: Due to a large number of results and/or encounters for the  requested time period, some results have not been displayed. A complete set of results can be found in Results Review.     Treatments provided: Had NS infusion running on arrival to ED room #1. Patient currently in IR to have PICC line placed    Family Comments:  at bedside, very involved    OBS brochure/video discussed/provided to patient/family: No              Name of person given brochure if not patient:               Relationship to patient:     ED Medications:   Medications   sodium chloride (PF) 0.9% PF flush 3 mL (has no administration in time range)   sodium chloride (PF) 0.9% PF flush 3 mL (has no administration in time range)   lidocaine 1 % 1-30 mL (has no administration in time range)       Drips infusing?:  Yes    For the majority of the shift this patient was Green.   Interventions performed were .    Severe Sepsis OR Septic Shock Diagnosis Present: No    To be done/followed up on inpatient unit:  IV antibiotics likely to be ancef per discharge orders at last admission    ED NURSE PHONE NUMBER: *[phone]

## 2019-11-19 NOTE — PROGRESS NOTES
"Pts spouse arrived at 11:45, shared his frustrations with the midline (\"it stopped working 4 hours after it was placed\") and with homecare (\"she placed a rico with no gloves\").  Acknowledged concerns.  Pt spouse also says pts very lethargic presentation is NOT her baseline, she was \"Very talkative until now, and look at her!\"   He asked this RN and resource RN to assess brandie area, and he left the room.  Evidently he proceeded to [facility] where security was called due to his yelling at staff.  ANS and patient relations contacted, page out to Dr. Mckenzie to see if admission needed.  Meanwhile IV team says this IV best placed in IR.  "

## 2019-11-19 NOTE — PATIENT INSTRUCTIONS
Recommendations from today's MTM visit:                                                      OK to take Lomotil tonight for diarrhea and follow-up with Klaudia tomorrow about continuing to take it.    I think you can go back to taking gabapentin 300mg twice a day, as long as your kidneys are staying stable. Please follow-up with Klaudia tomorrow about the dose change.    It was great to speak with you today.  I value your experience and would be very thankful for your time with providing feedback on our clinic survey. You may receive a survey via email or text message in the next few days.     Next MTM visit: 1 month by phone    To schedule another MTM appointment, please call the clinic directly or you may call the MTM scheduling line at [phone] or toll-free at [phone].     My Clinical Pharmacist's contact information:                                                      It was a pleasure talking with you today!  Please feel free to contact me with any questions or concerns you have.      Conchita Toledo, Fritz, [facility]   [phone]

## 2019-11-19 NOTE — ED NOTES
Pt hooked up to cardiac monitoring, pulse oxymetry, and blood pressure cuff. Pt's eyes closed, snoring but responsive to verbal stimulation.  at bedside. While sleeping pt desatting to 84%, when verbally aroused pt satting back to 98% on RA. Pt placed on 3L NC, now satting 100%.

## 2019-11-20 NOTE — PROGRESS NOTES
Pt placed on   CPAP/BiPAP Settings  IPAP: 12  EPAP: 5  Rate: 12  FiO2: 30  Timed Inspiration (sec): .9    CPAP/BiPAP/AVAPS/AVAPS AE Alarms  High Pressure: 20  Low Pressure: 5  Low Pressure Delay: 20  High Rate (breaths/min): 45  Low Rate (breaths/min): 5  Alarm Volume Level: 10    CPAP/BiPAP/AVAPS/AVAPS AE Patient Assessment  Skin Assessment: skin intact  Barriers Applied: gel pad/mepilex applied   Lung Sounds: diminished      Plan:  RT will continue to monitor     11/20/2019  Marivel Uriarte RT

## 2019-11-20 NOTE — PLAN OF CARE
Lethargic, disoriented. VSS on 2L refusing CPAP overnights, destats overnight. Pink rash under skin folds and groin area powder applied. Alex patent with good UOP. PICC infusing NS @100ml/hr. Turned and repo q2h.

## 2019-11-20 NOTE — PROGRESS NOTES
[facility]  Patient is currently open to home care services with [facility]. The patient is currently receiving Skilled Nursing and HHA services. [facility]  and team have been notified of patient admission. [facility] liaison will continue to follow patient during stay. If appropriate provide orders to resume home care at time of discharge.

## 2019-11-20 NOTE — CONSULTS
[facility]    Infectious Disease Consultation     Date of Admission:  11/19/2019  Date of Consult (When I saw the patient): 11/20/19    Assessment & Plan   Gem Jade is a 74 year old female who was admitted on 11/19/2019.     Impression:  1. 74 y.o female very well known to me from prior multiple admissions.   2. Currently on iV ancef for a recent MSSA bacteremia.   3. Was in the hospital due to midline/ picc related issues when noted to be more somnolent than usual.   5. UA grossly abnormal , UC and BC pending.     Recommendations:   Started on zosyn which will still cover the MSSA bacteremia for which she needs to stay on another week of antibiotics, plus provides broader GNR coverage.   Follow up on the cultures.       Paramjit Mckenzie MD    Reason for Consult   Reason for consult: I was asked by Dr. Salgado  to evaluate this patient for AMS.    Primary Care Physician   Mairvel Barcenas NP    Chief Complaint   Depressed mental status     History is obtained from the patient and medical records    History of Present Illness   Gem Jade is a 74 year old female who presents with depressed mental status.  She has recurrent urinary tract infections due to her chronic indwelling Johnson catheter and does have depressed mental status when she has urinary tract infection.  She has end-stage renal disease and is status post a renal transplant. She was recently in the hospital for SA bacteremia was getting IV ancef for this outpatient.     Past Medical History   I have reviewed this patient's medical history and updated it with pertinent information if needed.   Past Medical History:   Diagnosis Date     Background diabetic retinopathy(362.01)     extensive diabetic retinopathy, s/p multiple laser treatments     Diabetic gastroparesis (H)     followed by [facility] (Dr. 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  Past Surgical History:   Procedure Laterality Date     AMPUTATE TOE(S) Right 7/19/2019    Procedure: RIGHT FIRST TOE AMPUTATION;  Surgeon: Armen Jefferson DPM;  Location: [facility] OR     C NONSPECIFIC PROCEDURE  10/99    kidney transplant     C NONSPECIFIC PROCEDURE  8/00    MRI head, lumbar spine, pelvis     C TOTAL KNEE ARTHROPLASTY  3/03    Knee Replacement, Total right, post op PE     CHOLECYSTECTOMY       HC LEFT HEART CATHETERIZATION  1999    Cardiac Cath, Left Heart, Negative  (@ [facility])     HC LEFT HEART CATHETERIZATION  4/05    normal coronary angiogram at [facility], had mild troponin rise (0.71)     HC REMV CATARACT EXTRACAP,INSERT LENS  8/04    bilateral cataract repaired, left eye      IR PICC PLACEMENT > 5 YRS OF AGE  11/19/2019       Prior to Admission Medications   Prior to Admission Medications   Prescriptions Last Dose Informant Patient Reported? Taking?   COMFORT EZ PEN NEEDLES 31G X 6 MM miscellaneous  Spouse/Significant Other No No   Sig: USE 4 DAILY OR AS DIRECTED   CRANBERRY PO  Spouse/Significant Other Yes No   Sig: Take 1 tablet by mouth 2 times daily    acetaminophen (TYLENOL) 325 MG tablet  Spouse/Significant Other Yes No   Sig: Take 325-650 mg by mouth every 4 hours as needed    amLODIPine (NORVASC) 5 MG tablet  Spouse/Significant Other No No    Sig: TAKE TWO TABLETS (10MG) BY MOUTH DAILY   aspirin 81 MG tablet  Spouse/Significant Other Yes No   Sig: Take 81 mg by mouth daily   baclofen (LIORESAL) 10 MG tablet   Yes No   Sig: 10mg 2x a month with cath changes   blood glucose (NO BRAND SPECIFIED) lancets standard  Spouse/Significant Other No No   Sig: Use to test blood sugar 3 times daily or as directed. (Accuchek guide)   blood glucose (NO BRAND SPECIFIED) test strip  Spouse/Significant Other No No   Sig: Use to test blood sugar 3 times daily or as directed. (accuchek guide)   ceFAZolin (ANCEF) 1 GM vial   No No   Sig: Inject 2 g into the vein every 12 hours for 21 days CBC with differential, creatinine, SGOT weekly while on this medication to be faxed to Dr. Olmedo office.   cyanocobalamin (CYANOCOBALAMIN) 1000 MCG/ML injection  Spouse/Significant Other No No   Sig: Inject 1 mL (1,000 mcg) into the muscle every 2 months   diphenoxylate-atropine (LOMOTIL) 2.5-0.025 MG tablet  Spouse/Significant Other No No   Sig: Take 1-2 tablets by mouth 4 times daily as needed for diarrhea   famotidine (PEPCID) 20 MG tablet  Spouse/Significant Other Yes No   Sig: Take 1 tablet (20 mg) by mouth 2 times daily   gabapentin (NEURONTIN) 100 MG capsule   Yes No   Sig: Take 3 capsules (300 mg) by mouth 2 times daily May take one extra tab at bedtime as needed   gentamicin irrigation   Yes No   Sig: Irrigate rico cath 3x a week  60ml of 400mg of gentamicinin [id number] of 0.9% Sodium Chloride  On hold until done with current antibiotics   insulin aspart (NOVOLOG FLEXPEN) 100 UNIT/ML pen   Yes No   Sig: Take 10 units with meals + sliding scale if eating. Sliding scale only at bedtime when not eating. Max 25 units per day. Hold if pre-meal glucose is <90  Sliding scale:   140-199 2u  200-249 4u  250-299 6u  300-349 8u  350-399 10u  Over 400 12u   insulin glargine (LANTUS SOLOSTAR) 100 UNIT/ML pen  Spouse/Significant Other No No   Sig: Inject 20 Units Subcutaneous At Bedtime  "  labetalol (NORMODYNE) 300 MG tablet   No No   Sig: Take 1 tablet (300 mg) by mouth 2 times daily   lactobacillus rhamnosus, GG, (CULTURELL) capsule   No No   Sig: Take 1 capsule by mouth 3 times daily   levothyroxine (SYNTHROID/LEVOTHROID) 50 MCG tablet   No No   Sig: TAKE ONE TABLET BY MOUTH EVERY DAY   lidocaine (XYLOCAINE) 2 % external gel  Spouse/Significant Other No No   Sig: Apply topically 3 times daily   Patient taking differently: Apply topically 3 times daily as needed    loratadine (CLARITIN) 10 MG tablet  Spouse/Significant Other No No   Sig: Take 1 tablet (10 mg) by mouth daily   menthol-zinc oxide (CALMOSEPTINE) 0.44-20.625 % OINT ointment  Spouse/Significant Other Yes No   Sig: Apply topically 4 times daily as needed for skin protection   ondansetron (ZOFRAN ODT) 4 MG ODT tab  Spouse/Significant Other No No   Sig: Take 1 tablet (4 mg) by mouth daily as needed for nausea 30 minutes before getting up in your wheelchair   order for DME  Spouse/Significant Other No No   Sig: Equipment being ordered: [employer] gel-T cushion 20 inches wide & 18 inches deep    height 1.676 m (5' 6\"), weight 101.6 kg (223 lb 15.8 oz)   order for DME  Spouse/Significant Other No No   Sig: Equipment being ordered: Tegaderm 4x4   MICHELLE 6 months  Sacral wound   order for DME  Spouse/Significant Other No No   Sig: Equipment being ordered: CPAP mask, [employer] FX, nasal pillows system, women's,   Catalog # [id number]    Please send to  DME   order for DME  Spouse/Significant Other No No   Sig: Equipment being ordered: [employer] System, Titan 500, free standing overhead patient lift    Please send to  DME   pravastatin (PRAVACHOL) 10 MG tablet  Spouse/Significant Other No No   Sig: Take 1 tablet (10 mg) by mouth daily   prazosin (MINIPRESS) 1 MG capsule  Spouse/Significant Other No No   Sig: Take 2 capsules (2 mg) by mouth At Bedtime   predniSONE (DELTASONE) 5 MG tablet  Spouse/Significant Other No No   Sig: Take 1 tablet " (5 mg) by mouth daily   simethicone (MYLICON) 125 MG CHEW  Spouse/Significant Other Yes No   Sig: Take 1 tablet (125 mg) by mouth as needed for intestinal gas   sodium bicarbonate 650 MG tablet   No No   Sig: Take 1 tablet (650 mg) by mouth daily   tacrolimus (GENERIC EQUIVALENT) 1 MG capsule  Spouse/Significant Other No No   Sig: Take 2 capsules (2 mg) by mouth 2 times daily   vitamin D3 (CHOLECALCIFEROL) 2000 units (50 mcg) tablet  Spouse/Significant Other Yes No   Sig: Take 2,000 Units by mouth 2 times daily       Facility-Administered Medications: None     Allergies   Allergies   Allergen Reactions     Atorvastatin Calcium      myalgias, muscle aches     Codeine Nausea and Vomiting     Darvocet [Propoxyphene N-Apap] Nausea and Vomiting     Hydromorphone      Levofloxacin Other (See Comments) and Diarrhea     hallucinations     Morphine      Nausea and vomiting     Niaspan [Niacin] GI Disturbance     Flushing even at low dose, GI upset     Percocet [Oxycodone-Acetaminophen]      Vomiting after taking med couple of times     Vicodin [Hydrocodone-Acetaminophen] Nausea and Vomiting       Immunization History   Immunization History   Administered Date(s) Administered     FLU 6-35 months 11/05/2011     Flu, Unspecified 10/13/1997     HepB 01/13/2003     HepB-Adult 01/13/2003     Influenza (H1N1) 11/25/2009     Influenza (High Dose) 3 valent vaccine 12/01/2015, 10/07/2016     Influenza (IIV3) PF 11/30/2000, 11/04/2002, 11/14/2003, 10/22/2004, 11/03/2005, 12/04/2006, 11/12/2007, 10/21/2008, 10/06/2009, 11/09/2010, 11/05/2011     Influenza Vaccine IM > 6 months Valent IIV4 10/16/2013     Pneumo Conj 13-V (2010&after) 12/01/2015     Pneumococcal 23 valent 06/17/2013     TDAP Vaccine (Adacel) 06/17/2013     Zoster vaccine, live 06/17/2013       Social History   I have reviewed this patient's social history and updated it with pertinent information if needed. Gem ANTONINO Jade  reports that she quit smoking about 32 years  ago. She quit after 17.00 years of use. She has never used smokeless tobacco. She reports that she does not drink alcohol or use drugs.    Family History   I have reviewed this patient's family history and updated it with pertinent information if needed.   Family History   Problem Relation Age of Onset     Diabetes Mother      Parkinsonism Mother      Diabetes Brother      Hypertension Father         [id number] pneumonia     Heart Disease Father        Review of Systems   The 10 point Review of Systems is negative other than noted in the HPI or here.     Physical Exam   Temp: 97.2  F (36.2  C) Temp src: Oral BP: (!) 178/63 Pulse: 62 Heart Rate: 78 Resp: 15 SpO2: 100 % O2 Device: Oxymask Oxygen Delivery: 3 LPM  Vital Signs with Ranges  Temp:  [95.7  F (35.4  C)-97.5  F (36.4  C)] 97.2  F (36.2  C)  Pulse:  [60-62] 62  Heart Rate:  [59-78] 78  Resp:  [8-17] 15  BP: (110-183)/(37-89) 178/63  SpO2:  [85 %-100 %] 100 %  254 lbs 0 oz  Body mass index is 41 kg/m .    GENERAL APPEARANCE:  Sleepy, not interacting   EYES: Eyes grossly normal to inspection, PERRL and conjunctivae and sclerae normal  HENT: ear canals and TM's normal and nose and mouth without ulcers or lesions  NECK: no adenopathy, no asymmetry, masses, or scars and thyroid normal to palpation  RESP: lungs clear to auscultation - no rales, rhonchi or wheezes  CV: regular rates and rhythm, normal S1 S2, no S3 or S4 and no murmur, click or rub  LYMPHATICS: normal ant/post cervical and supraclavicular nodes  ABDOMEN: soft, nontender, without hepatosplenomegaly or masses and bowel sounds normal  MS: extremities normal- no gross deformities noted  SKIN: no suspicious lesions or rashes      Data   Lab Results   Component Value Date    WBC 7.9 11/20/2019    HGB 8.8 (L) 11/20/2019    HCT 29.6 (L) 11/20/2019     11/20/2019     11/20/2019    POTASSIUM 5.3 11/20/2019    CHLORIDE 114 (H) 11/20/2019    CO2 20 11/20/2019    BUN 65 (H) 11/20/2019    CR 3.73 (H)  11/20/2019     (H) 11/20/2019    SED 53 (H) 05/25/2016    NTBNPI 660 01/10/2016    TROPONIN 0.00 02/28/2019    TROPI <0.015 06/02/2019    AST 12 11/19/2019    ALT <6 11/19/2019    ALKPHOS 69 11/19/2019    BILITOTAL <0.1 (L) 11/19/2019    NOREEN 18 07/19/2019    INR 1.12 11/19/2019     Recent Labs   Lab 11/19/19  1327 11/19/19  1245 11/19/19  1235   CULT No growth after 14 hours Culture in progress No growth after 16 hours     Recent Labs   Lab Test 11/19/19  1327 11/19/19  1245 11/19/19  1235 11/03/19 2008 11/02/19  1521 11/01/19  1549 11/01/19  1540 10/31/19  1404 10/31/19  1310   CULT No growth after 14 hours Culture in progress No growth after 16 hours No growth No growth No growth No growth No growth  Cultured on the 2nd day of incubation:  Staphylococcus aureus  *  Critical Value/Significant Value, preliminary result only, called to and read back by   Johana Robison RN 11/1/2019 @1100 HDP      (Note)  POSITIVE for STAPHYLOCOCCUS AUREUS and NEGATIVE for the mecA gene  (not MRSA) by [employer] multiplex nucleic acid test. The mecA gene was  not detected. Final identification and antimicrobial susceptibility  testing will be verified by standard methods.    Specimen tested with [employer] multiplex, gram-positive blood culture  nucleic acid test for the following targets: Staph aureus, Staph  epidermidis, Staph lugdunensis, other Staph species, Enterococcus  faecalis, Enterococcus faecium, Streptococcus species, S. agalactiae,  S. anginosus grp., S. pneumoniae, S. pyogenes, Listeria sp., mecA  (methicillin resistance) and Viridiana/B (vancomycin resistance).    Critical Value/Significant Value called to and read back by GENE WONG AT 1350 11/1/19 [facility]   >100,000 colonies/mL  Citrobacter freundii complex  *  >100,000 colonies/mL  Staphylococcus aureus  *  >100,000 colonies/mL  urogenital santos  Susceptibility testing not routinely done         Amount of time performed on this consult: 45 minutes. This includes  face to face assessment and care coordination with the primary team.

## 2019-11-20 NOTE — H&P
"Admitted:     11/19/2019     DATE OF SERVICE: 11/19/2019     PRIMARY CARE PROVIDER:  Mairvel Barcenas, ANTHONY, CNP.      CHIEF COMPLAINT:  Nonfunctioning the Medline and altered mental status.      HISTORY OF PRESENT ILLNESS:  Had been somehow limited due to the patient's altered mental status.  Her  was present at the time of my examination, but he was quite frustrated with the events earlier today and did not really want to \"repeat the whole story again and fatigue.  I discussed with ER attending, Dr. Llamas and I reviewed her chart extensively.      Gem Jade has a history of chronic kidney disease with a history of renal transplant, hypertension, dyslipidemia, diabetes mellitus type 2, chronic Johnson catheter with history of recurrent UTIs and a history of encephalopathy associated with UTIs who presented to [facility] to have a PICC line placed after her midline was found to not function well at home.      The patient has a history of recurrent UTIs associated with her chronic Johnson indwelling catheter; last admission to [facility] was from 10/31 until 11/8/2019.  Her UA was grossly abnormal.  Her urine cultures were positive for Citrobacter and MSSA.  Blood cultures were also positive for MSSA.  She was followed by ID.  While she was in the hospital, she was on Zosyn and then she was switched to Ancef for 21 more days for a total of 4 weeks IV antibiotic treatment.  It seems that she had a midline placed before discharge and her  states that her midline did not work well from the beginning.  A home RN tried to fix the problem and it apparently worked for 3 days and then got clogged again and apparently came out.  She had another midline placed, which again worked only for 1 or 2 days.  Home infusion nurse removed the second midline and put a peripheral line which apparently worked well, as per her .  He states that [facility] called them " yesterday asking them to come to the hospital to have a PICC line placed.      He states that except having this problem with midline otherwise, she seems to be doing well at home until this morning.  He states that last night she was in her usual state of health.  In the morning he let her sleep more.  When she woke up she seemed to be very tired.  She was brought in to have a PICC line placed by ID team, which apparently had been unsuccessful.  While the PICC line was attempted the nurse became concerned regarding her increased altered mental status.  RRT was called.  Blood sugars at that time were 163.  She had basic blood work done there, which showed a creatinine of 3.88.  BUN was 58, chloride 113.  Her sodium was 138, potassium 5.3.  Her LFTs with albumin of 2, total protein 6.5, alkaline phosphatase 69, ALT less than 6, AST 12.  Lactic acid was 0.8.  Her CBC with no leukocytosis.  White blood cell was 6.3, hemoglobin 8.9, hematocrit 29.5 and platelet count 155.  Her UA from a Johnson catheter was again grossly abnormal with white blood cells more than 182, large leukocyte esterase, moderate blood.  Urine culture and blood cultures pending.  She was given 0.2 mg of Narcan with minimal improvement in her responsiveness.  Her  states that this is her usual presentation when she has a UTI.  A CAT scan was initially ordered but later on was discontinued given the fact that her presentation is very similar to her prior presentations with UTIs.  The patient was transferred to ER where she was seen by Dr. Llamas and Hospitalist Service was called regarding the admission.      In ER, her vitals showed a blood pressure of 171/89, heart rate was 62, respiratory rate 18, oxygen saturation 100% on room air and temperature 97.1.  I saw the patient in ER.  She reported feeling cold and feeling very tired.  She kept her eyes closed, but able to open them and responded appropriately to most of the questions.  Besides  feeling cold and tired, she denies any fevers and no chills.  She denies chest pain, no shortness of breath, no coughing, no abdominal pain.  Apparently, she has some chronic vertigo and sometimes gets nausea with that.  Her  states that she had some diarrhea last night, now resolved.  There are no other concerns.      ER attending discussed with IR and subsequently patient went for a PICC placement by IR.  She is admitted for further management.      PAST MEDICAL HISTORY:   1. Chronic kidney disease, stage III to IV, with history of renal transplant, being followed by nephrologist at the [facility], as well as Dr. Mckenzie.  Her most creatinine was between 2.4 to 2.6.   2. Hypertension.   3. Dyslipidemia.   4. Diabetes mellitus type 2.   5. Chronic Johnson catheter.   6. Catheter-associated UTIs with most recent hospitalization from 10/31 through 11/8/2019.   7. History of acute encephalopathy and delirium associated with her UTIs.   8. Hypothyroidism.   9. Diabetic retinopathy/neuropathy.   10. Gastroesophageal reflux disease.   11. History of paroxysmal atrial fibrillation, on aspirin.   12. History of provoked bilateral PEs.   13. History of obstructive sleep apnea on CPAP.   14. Morbid obesity, BMI 40.      PAST SURGICAL HISTORY:     Past Surgical History:   Procedure Laterality Date     AMPUTATE TOE(S) Right 7/19/2019     C NONSPECIFIC PROCEDURE  10/99     C NONSPECIFIC PROCEDURE  8/00     C TOTAL KNEE ARTHROPLASTY  3/03     CHOLECYSTECTOMY       HC LEFT HEART CATHETERIZATION  1999     HC LEFT HEART CATHETERIZATION  4/05     HC REMV CATARACT EXTRACAP,INSERT LENS  8/04     IR PICC PLACEMENT > 5 YRS OF AGE  11/19/2019        SOCIAL HISTORY:  She lives with her .  She is a former smoker.  She quit smoking in 1987.  She denies alcohol drinking.  She denies illicit drug abuse.      FAMILY HISTORY:   Family History     Problem (# of Occurrences) Relation (Name,Age of Onset)    Diabetes (2) Mother, Brother     Heart Disease (1) Father    Hypertension (1) Father: [id number] pneumonia    Parkinsonism (1) Mother           PRIOR TO ADMISSION MEDICATIONS:   No current facility-administered medications on file prior to encounter.   acetaminophen (TYLENOL) 325 MG tablet, Take 325-650 mg by mouth every 4 hours as needed   amLODIPine (NORVASC) 5 MG tablet, TAKE TWO TABLETS (10MG) BY MOUTH DAILY  aspirin 81 MG tablet, Take 81 mg by mouth daily  baclofen (LIORESAL) 10 MG tablet, 10mg 2x a month with cath changes  blood glucose (NO BRAND SPECIFIED) lancets standard, Use to test blood sugar 3 times daily or as directed. (Accuchek guide)  blood glucose (NO BRAND SPECIFIED) test strip, Use to test blood sugar 3 times daily or as directed. (accuchek guide)  ceFAZolin (ANCEF) 1 GM vial, Inject 2 g into the vein every 12 hours for 21 days CBC with differential, creatinine, SGOT weekly while on this medication to be faxed to Dr. Olmedo office.  COMFORT EZ PEN NEEDLES 31G X 6 MM miscellaneous, USE 4 DAILY OR AS DIRECTED  CRANBERRY PO, Take 1 tablet by mouth 2 times daily   cyanocobalamin (CYANOCOBALAMIN) 1000 MCG/ML injection, Inject 1 mL (1,000 mcg) into the muscle every 2 months  diphenoxylate-atropine (LOMOTIL) 2.5-0.025 MG tablet, Take 1-2 tablets by mouth 4 times daily as needed for diarrhea  famotidine (PEPCID) 20 MG tablet, Take 1 tablet (20 mg) by mouth 2 times daily  gabapentin (NEURONTIN) 100 MG capsule, Take 3 capsules (300 mg) by mouth 2 times daily May take one extra tab at bedtime as needed  gentamicin irrigation, Irrigate rico cath 3x a week  60ml of 400mg of gentamicinin [id number] of 0.9% Sodium Chloride  On hold until done with current antibiotics  insulin aspart (NOVOLOG FLEXPEN) 100 UNIT/ML pen, Take 10 units with meals + sliding scale if eating. Sliding scale only at bedtime when not eating. Max 25 units per day. Hold if pre-meal glucose is <90  Sliding scale:   140-199 2u  200-249 4u  250-299 6u  300-349 8u  350-399  "10u  Over 400 12u  insulin glargine (LANTUS SOLOSTAR) 100 UNIT/ML pen, Inject 20 Units Subcutaneous At Bedtime  labetalol (NORMODYNE) 300 MG tablet, Take 1 tablet (300 mg) by mouth 2 times daily  lactobacillus rhamnosus, GG, (CULTURELL) capsule, Take 1 capsule by mouth 3 times daily  levothyroxine (SYNTHROID/LEVOTHROID) 50 MCG tablet, TAKE ONE TABLET BY MOUTH EVERY DAY  lidocaine (XYLOCAINE) 2 % external gel, Apply topically 3 times daily (Patient taking differently: Apply topically 3 times daily as needed )  loratadine (CLARITIN) 10 MG tablet, Take 1 tablet (10 mg) by mouth daily  menthol-zinc oxide (CALMOSEPTINE) 0.44-20.625 % OINT ointment, Apply topically 4 times daily as needed for skin protection  ondansetron (ZOFRAN ODT) 4 MG ODT tab, Take 1 tablet (4 mg) by mouth daily as needed for nausea 30 minutes before getting up in your wheelchair  order for DME, Equipment being ordered: PresenceID Mobility System, Titan 500, free standing overhead patient lift    Please send to  DME  order for DME, Equipment being ordered: CPAP mask, ResMed Ahuja FX, nasal pillows system, women's,   Catalog # [id number]    Please send to  DME  order for DME, Equipment being ordered: Tegaderm 4x4   MICHELLE 6 months  Sacral wound  order for DME, Equipment being ordered: Magic Leap gel-T cushion 20 inches wide & 18 inches deep    height 1.676 m (5' 6\"), weight 101.6 kg (223 lb 15.8 oz)  pravastatin (PRAVACHOL) 10 MG tablet, Take 1 tablet (10 mg) by mouth daily  prazosin (MINIPRESS) 1 MG capsule, Take 2 capsules (2 mg) by mouth At Bedtime  predniSONE (DELTASONE) 5 MG tablet, Take 1 tablet (5 mg) by mouth daily  simethicone (MYLICON) 125 MG CHEW, Take 1 tablet (125 mg) by mouth as needed for intestinal gas  sodium bicarbonate 650 MG tablet, Take 1 tablet (650 mg) by mouth daily  tacrolimus (GENERIC EQUIVALENT) 1 MG capsule, Take 2 capsules (2 mg) by mouth 2 times daily  vitamin D3 (CHOLECALCIFEROL) 2000 units (50 mcg) tablet, Take 2,000 Units by " mouth 2 times daily           ALLERGIES:    Allergies   Allergen Reactions     Atorvastatin Calcium      myalgias, muscle aches     Codeine Nausea and Vomiting     Darvocet [Propoxyphene N-Apap] Nausea and Vomiting     Hydromorphone      Levofloxacin Other (See Comments) and Diarrhea     hallucinations     Morphine      Nausea and vomiting     Niaspan [Niacin] GI Disturbance     Flushing even at low dose, GI upset     Percocet [Oxycodone-Acetaminophen]      Vomiting after taking med couple of times     Vicodin [Hydrocodone-Acetaminophen] Nausea and Vomiting         REVIEW OF SYSTEMS:  A 10-point review of systems was conducted and it was negative except for pertinent positives mentioned in history of present illness.      PHYSICAL EXAMINATION:   VITAL SIGNS:  Blood pressure is 113/39, heart rate 60, respiratory rate 12, oxygen saturation 100% on 4 liters via nasal cannula, temperature 97.1.   GENERAL:  The patient is sleepy, lethargic, able to wake up and provide appropriate answers.  No focal neurological deficits.   HEENT:  Normocephalic, atraumatic.  Pupils are equally round and reactive to light.  Oral mucosa is dry.   NECK:  Supple.  No cervical lymphadenopathy, no thyromegaly.   CHEST:  There is bilateral air entry, no wheezing, no rales, no crackles.   CARDIOVASCULAR:  There is normal S1, S2, regular rate and rhythm, no murmurs, no rubs.   ABDOMEN:  Soft, obese, nontender.  Bowel sounds are present.   EXTREMITIES:  There is no leg swelling, no calf tenderness, 2+ peripheral pulses are palpable.   SKIN:  Intact, no rash, no cyanosis.   NEUROLOGIC:  The patient is lethargic but able to wake up and respond appropriately to most of the questions.  There are no focal neurological deficits.   Psychiatric  Evaluation:  Normal mood, normal affect.   MUSCULOSKELETAL:  She seems to move all extremities freely.  There are no obvious joint deformities.      LABORATORY DATA:  Reviewed and dictated above.      ASSESSMENT  AND PLAN:  Ms. Gem Jade is a pleasant 74-year-old female with complex past medical history of hypertension, dyslipidemia, diabetes mellitus type 2, history of renal transplant on prednisone and tacrolimus with CKD, chronic indwelling Johnson catheter with recurrent catheter-associated UTIs who presented initially to [facility] to have a PICC line placed.  Throughout the day, patient became more lethargic and somnolent, which is similar to her prior presentations for recurrent UTIs, and she is admitted for further evaluation.      PLAN:   1. Recurrent catheter-associated urinary tract infections.  She has a chronic Johnson indwelling catheter.  Last hospitalization was from 10/31 to 11/08/2019.  Her urine cultures were positive for Citrobacter and MSSA.  She also bacteremic with MSSA.  She was treated with Zosyn in the hospital and switched to Ancef as per ID to continue for 3 more weeks for a total of 4 weeks of antibiotics.  She was discharged with midline in place.  Apparently Medline did not work properly.  It was replaced.  Even a second Medline did not work properly and she was asked to come to ER to have a PICC line placed.  IV team tried not successful to place a PICC line earlier today and eventually she had a PICC line placed by IR.   - There is no reported fever; Her UA is grossly abnormal again with white blood cells more than 182, large leukocyte esterase, moderate blood.  She does not have reported fever or elevated white blood cells.  Lactic acid is 0.8.  She did present with altered mental status again, which is similar to her prior hospitalizations.  Given the fact that her midline did not work properly at home, there is a concern for an incompletely treated infection.  We will start her on Zosyn again with renal dosing.  We will follow up urine cultures and blood cultures.  We will follow up fever curve and white blood cell trend, and ID consult requested.      2.  Acute  encephalopathy, likely related to her infection and worsening renal function:  It seems that she had similar presentations in the past associated with her UTIs.  During her most recent hospitalization in the beginning of November, she had also problems with delirium.  She was seen by Psychiatry.  It seems that the delirium resolved.  Plan for now is to treat her as per #1.  We will also provide some IV fluids and hopefully her mentation will improve back to baseline.      3. Hypertension:  Her blood pressure was on the higher side initially; later on BP was better controlled.  We will resume her prior to admission medications including labetalol 300 mg twice daily and Norvasc 10 mg p.o. daily with holding parameters.  I am holding her prior to admission Prazosin for now.  Will closely monitor her blood pressures and hydralazine IV p.r.n. has been ordered for elevated blood pressures.  If her blood pressure remains persistently elevated, can resume her prior to admission Prazosin as well.      4.  Acute kidney injury on chronic kidney disease, stage III to IV.        History of renal transplant:    Her baseline creatinine had been between 1.5-1.8 earlier this year.  More recently between 2.4-2.6.  She presented with a creatinine of 3.88 which is clearly above her baseline.  Clinically, she looks dry.  Her oral mucosa is very dry.  We will start her on some IV fluids, normal saline at 100 mL per hour.  We will avoid nephrotoxic drugs, and repeat a BMP in the morning.  I am holding her prior to admission gabapentin for now.  I am planning to resume her prior to admission prednisone 5 mg p.o. daily and tacrolimus 2 mg p.o. twice daily.  Nephrology consult requested.      5.  Dyslipidemia:  We will continue her prior to admission Pravachol.      6. Hypothyroidism:  Continue her prior to admission levothyroxine.      7.  Diabetes mellitus type 2, insulin-dependent:  At home she had maintained on Lantus 20 units  subcutaneously at bedtime and insulin NovoLog 10 units with meals and sliding scale.  Her most recent hemoglobin A1c was 5.8 on 2019.  Plan for now is to continue with Lantus at a decreased dose 10 units subcutaneously daily and insulin sliding scale.  We will check blood sugars before meals and at bedtime and insulin sliding scale has been ordered.      8.  History of obstructive sleep apnea:  We will order CPAP as per home settings.      9.  History of paroxysmal atrial fibrillation, not on anticoagulation prior to admission and continue her prior to admission aspirin.      10.  History of gastroesophageal reflux disease:  We will continue her prior to admission famotidine.      11.  Deep venous thrombosis prophylaxis:  Pneumatic compression device.      CODE STATUS:  Discussed with the patient's .  The patient is full code.      DISPOSITION:  Admit inpatient.  I anticipate at least a couple of days of hospitalization.      Continuation #[id number] added lg 19            GEMA OMALLEY MD             D: 2019   T: 2019   MT: CELESTE      Name:     MARYJANE SHAVER   MRN:      [id number]        Account:      [id number]   :      1945        Admitted:     2019                   Document: [id number]       cc: Marivel Barcenas APREILEEN, CNP

## 2019-11-20 NOTE — PROGRESS NOTES
[facility]    Hospitalist Progress Note    Date of Service (when I saw the patient): 11/20/2019    Assessment & Plan   Gem Jade is a 74 year old female who was admitted on 11/19/2019.  LABORATORY DATA:  Reviewed and dictated above.      ASSESSMENT AND PLAN:  Ms. Gem Jade is a pleasant 74-year-old female with complex past medical history of hypertension, dyslipidemia, diabetes mellitus type 2, history of renal transplant on prednisone and tacrolimus with CKD, chronic indwelling Johnson catheter with recurrent catheter-associated UTIs who presented initially to [facility] to have a PICC line placed.  Throughout the day, patient became more lethargic and somnolent, which is similar to her prior presentations for recurrent UTIs, and she is admitted for further evaluation.      PLAN:   1. Recurrent catheter-associated urinary tract infections.  She has a chronic Johnson indwelling catheter.  Last hospitalization was from 10/31 to 11/08/2019.  Her urine cultures were positive for Citrobacter and MSSA.  She also bacteremic with MSSA.  She was treated with Zosyn in the hospital and switched to Ancef as per ID to continue for 3 more weeks for a total of 4 weeks of antibiotics.  She was discharged with Medline in place.  Apparently Medline did not work properly.  It was replaced.  Even a second Medline did not work properly and she was asked to come to ER to have a PICC line placed.  IV team tried not successful to place a PICC line earlier today and eventually she had a PICC line placed by IR.       --  Her UA is grossly abnormal again with white blood cells more than 182, large leukocyte esterase, moderate blood.  She does not have reported fever or elevated white blood cells.  Lactic acid is 0.8.  She did present with altered mental status again, which is similar to her prior hospitalizations.  Given the fact that her Medline did not work properly at home, there is a concern for an  incompletely treated infection.  We will start her on Zosyn again with renal dosing.  We will follow up urine cultures and blood cultures.  We will follow up fever curve and white blood cell trend, and ID consult requested.    Urine culture results are pending.  Blood cultures are negative so far      afebrile since admission  Patient  remains lethargic and somnolent and mostly unresponsive on my exam today  He was refusing CPAP overnight.  So we have put soft wrist restraints on her and start her on BiPAP    As per nursing report patient became more alert and awake after the BiPAP was started  So we will transfer her to [facility] status.  Transfer orders placed and started on [facility] order set       -- Acute encephalopathy, likely related to her infection and worsening renal function and possible underlying severe sleep apnea contributing:  It seems that she had similar presentations in the past associated with her UTIs.  During her most recent hospitalization in the beginning of November, she had also problems with delirium.  She was seen by Psychiatry.  It seems that the delirium resolved.  Plan for now is to treat her as per #1.  We will also provide some IV fluids and hopefully her mentation will improve back to baseline.     Noted above patient was placed on BiPAP today  And will transfer her to [facility] status  CAT scan head without contrast was ordered to evaluate for the acute encephalopathy as well     -- Hypertension:  Her blood pressure was on the higher side initially.  Later on better controlled.  We will resume her prior to admission medications including labetalol 300 mg twice daily and Norvasc 10 mg p.o. daily with holding parameters.  I am holding her prior to admission Prazosin for now.  Will closely monitor her blood pressures and hydralazine IV p.r.n. has been ordered for elevated blood pressures.  If her blood pressure remains persistently elevated, can resume her prior to admission Prazosin as well.   No  oral meds on her on hold while she is on BiPAP  So will use as needed hydralazine for blood pressure     -- Acute kidney injury on chronic kidney disease, stage III to IV.      -- History of renal transplant:  Her baseline creatinine had been between 1.5-1.8 earlier this year.  More recently between 2.4-2.6.  She presented with a creatinine of 3.88 which is clearly above her baseline.  Clinically, she looks dry.  Her oral mucosa is very dry.  We will start her on some IV fluids, normal saline at 100 mL per hour.  We will avoid nephrotoxic drugs, and repeat a BMP in the morning.  I am holding her prior to admission gabapentin for now.  I am planning to resume her prior to admission prednisone 5 mg p.o. daily and tacrolimus 2 mg p.o. twice daily.  Nephrology consult requested.     Patient is unable to take tacrolimus and prednisone while n.p.o. on the BiPAP  Started on IV Solu-Medrol 50 mg every 8 hours  Tacrolimus level ordered for tomorrow a.m.     -- Dyslipidemia:   Hold prior to admission Pravachol while on BiPAP.      -- Hypothyroidism:  Continue her prior to admission levothyroxine switched to IV.      -- Diabetes mellitus type 2, insulin-dependent:  At home she had maintained on Lantus 20 units subcutaneously at bedtime and insulin NovoLog 10 units with meals and sliding scale.  Her most recent hemoglobin A1c was 5.8 on 11/01/2019.  Plan for now is to continue with Lantus at a decreased dose 10 units subcutaneously daily and insulin sliding scale.  We will check blood sugars before meals and at bedtime and insulin sliding scale has been ordered.     Blood sugars every 4 hours will be ordered while n.p.o. on BiPAP     -- History of obstructive sleep apnea:  We will order CPAP as per home settings.      -- History of paroxysmal atrial fibrillation, not on anticoagulation prior to admission and continue her prior to admission aspirin.      -- History of gastroesophageal reflux disease:  We will continue her prior  to admission famotidine.      -- Deep venous thrombosis prophylaxis:  Pneumatic compression device.      CODE STATUS:  Discussed with the patient's .  The patient is full code.       Disposition: Expected discharge in the next 2 to 3 days once mental status is improved    Janki Marin MD  [phone] (P)      Interval History   While patient was lethargic and unresponsive this morning.  Was unable to open her eyes on my exam today.  She was refusing CPAP.  Patient is not able to make medical decisions due to acute mental status change and lethargy.  She was started on wrist restraints and placed on BiPAP.  Became more  alert and awake and opened her eyes after placing the BiPAP    -Data reviewed today: I reviewed all new labs and imaging results over the last 24 hours. I personally reviewed no images or EKG's today.    Physical Exam   Temp: 97.9  F (36.6  C) Temp src: Axillary BP: (!) 175/68 Pulse: 62 Heart Rate: 79 Resp: 16 SpO2: 100 % O2 Device: BiPAP/CPAP Oxygen Delivery: 3 LPM  Vitals:    11/19/19 1311 11/20/19 0545   Weight: 115.2 kg (253 lb 15.5 oz) 115.2 kg (254 lb)     Vital Signs with Ranges  Temp:  [95.7  F (35.4  C)-97.9  F (36.6  C)] 97.9  F (36.6  C)  Pulse:  [60-62] 62  Heart Rate:  [59-79] 79  Resp:  [8-17] 16  BP: (110-178)/(37-89) 175/68  SpO2:  [85 %-100 %] 100 %  I/O last 3 completed shifts:  In: 2152 [I.V.:2152]  Out: 1200 [Urine:1200]    Constitutional: Somnolent and lethargic.  Able to maintain her airway  Respiratory: Clear to auscultation bilaterally, no crackles or wheezing  Cardiovascular: Regular rate and rhythm, normal S1 and S2, and no murmur noted  GI: Normal bowel sounds, soft, non-distended, non-tender  Skin/Integumen: No rashes, no cyanosis, no edema  Other:     Medications     - MEDICATION INSTRUCTIONS -       sodium chloride 100 mL/hr at 11/20/19 0537       amLODIPine  10 mg Oral Daily     aspirin  81 mg Oral Daily     famotidine  20 mg Oral BID     hydrocortisone sodium  succinate PF  50 mg Intravenous Q8H     insulin aspart  1-7 Units Subcutaneous TID AC     insulin aspart  1-5 Units Subcutaneous At Bedtime     insulin glargine  10 Units Subcutaneous At Bedtime     labetalol  300 mg Oral BID     lactobacillus rhamnosus (GG)  1 capsule Oral TID     levothyroxine  50 mcg Oral Daily     piperacillin-tazobactam  2.25 g Intravenous Q8H     pravastatin  10 mg Oral Daily     predniSONE  5 mg Oral Daily     sodium bicarbonate  650 mg Oral Daily     sodium chloride (PF)  3 mL Intracatheter Q8H     tacrolimus  2 mg Oral BID       Data   Recent Labs   Lab 11/20/19  0600 11/19/19  1415 11/19/19  1327 11/19/19  1235   WBC 7.9  --  6.3  --    HGB 8.8*  --  8.9*  --    MCV 96  --  96  --      --  155  --    INR  --  1.12  --   --      --   --  138   POTASSIUM 5.3  --   --  5.3   CHLORIDE 114*  --   --  113*   CO2 20  --   --  21   BUN 65*  --   --  58*   CR 3.73*  --   --  3.88*   ANIONGAP 5  --   --  4   ROGER 8.0*  --   --  8.0*   *  --   --  204*   ALBUMIN  --   --   --  2.0*   PROTTOTAL  --   --   --  6.5*   BILITOTAL  --   --   --  <0.1*   ALKPHOS  --   --   --  69   ALT  --   --   --  <6   AST  --   --   --  12       Recent Results (from the past 24 hour(s))   IR PICC Placement > 5 Yrs of Age    [facility]    INTERVENTIONAL RADIOLOGY PICC PLACEMENT > 5 YEARS OF AGE 11/19/2019  3:40 PM    HISTORY: 74-year-old patient with need for long-term IV access for IV  antibiotic administration.    TECHNIQUE: Patient was brought to the interventional radiology  department after informed consent obtained. Patient was placed in a  supine position. Skin overlying the right arm was prepped and draped  in standard sterile fashion. Ultrasound was used to visualize a single  patent brachial vein. Basilic and cephalic veins not identified.  Ultrasound images were saved, documenting patency of the brachial  vein. After 1% lidocaine administration, with continuous ultrasound  guidance a  micropuncture kit was used to access the brachial vein. A  wire was advanced, over which a peel-away sheath was placed. A 4  Sami single lumen power PICC was then advanced through the sheath  until tip at the right atrial/SVC junction. PICC was secured in  position, aspirated well, and was flushed with normal saline.    Maximal Sterile Barrier Technique Utilized: Cap AND mask AND sterile  gown AND sterile gloves AND sterile full body drape AND hand hygiene  AND skin preparation 2% chlorhexidine for cutaneous antisepsis (or  acceptable alternative antiseptics).  Sterile Ultrasound Technique  Utilized ?Sterile gel AND sterile probe covers.    Sedation: None  Fluoroscopic time: 1 minute  Air Kerma: 12.6 mGy  Contrast: None    LOCAL ANESTHETIC: 5 mL of 1% lidocaine.    FINDINGS: Single spot fluoroscopic image confirms appropriate  placement of right upper extremity PICC with tip at the right  atrial/SVC junction.      Impression    IMPRESSION: Successful placement of right upper extremity brachial  vein PICC. The PICC is ready for immediate use.    RODRICK SUAREZ MD

## 2019-11-20 NOTE — CONSULTS
Consult Date:  11/20/2019      RENAL CONSULTATION       REQUESTING PHYSICIAN:  Yandy Salgado MD.       REASON FOR CONSULTATION:  Acute kidney injury, status post renal transplant, urinary tract infection.      HISTORY OF PRESENT ILLNESS:  This is a 74-year-old admitted with depressed mental status.  She has recurrent urinary tract infections due to her chronic indwelling Johnson catheter and does have depressed mental status when she has urinary tract infection.  She has end-stage renal disease and is status post a renal transplant.  Her kidney is not functioning well and she has chronic kidney disease stage IV.  She is maintained on prednisone and tacrolimus.  Her last tacrolimus level in 09/19 was 5.5.  The patient is unable to give further history.  She is quite somnolent at this point.      PAST MEDICAL HISTORY:  Hypertension, diabetes, obesity and a chronic indwelling Johnson.      PAST SURGICAL HISTORY:  Includes renal transplant, toe amputation, total knee arthroplasty, cholecystectomy and cataract surgery.  She also has a PICC line in place.      CURRENT MEDICATIONS: Amlodipine and labetalol for blood pressure.  She is on prednisone and tacrolimus.  She is on sodium bicarbonate.  For her diabetes, she is on insulin, and for her urinary tract infection she is on Zosyn and vancomycin.      REVIEW OF SYSTEMS:  She is nonambulatory.  She cannot give further history.      SOCIAL HISTORY:  She is .  She lives with her .  She is a former smoker.  She does not drink.      FAMILY HISTORY:  There is diabetes in her mother and brother, hypertension in her father, ASCVD in her father, Parkinson's disease in her mother.      PHYSICAL EXAMINATION:   GENERAL:  She is somnolent and basically unarousable.  Her weight yesterday was 115.2 kg.   VITAL SIGNS:  Today, her blood pressure is 178/63, respiratory rate 15.  She is afebrile.  Her heart rate is 78.   SKIN:  Negative.   HEENT:  Head shows no trauma.  The eyes  show pupils round and reactive to light.  The mouth shows good dentition.  Posterior pharynx is clear.   NECK:  Supple.   LUNGS:  Clear anterior breath sounds.  There is no wheezing or rales.   CARDIAC:  Regular rhythm, normal S1, S2, no murmur.   ABDOMEN:  Obese.  There are normal bowel sounds.  It is soft and nontender.   GENITOURINARY:  Johnson catheter in place.   EXTREMITIES:  She has a PICC in the right upper extremity.  There is no redness or tenderness surrounding this.  The lower extremities show Charcot foot bilaterally and toe amputation on the right.   NEUROLOGIC:  She is somnolent and obtunded.      LABORATORY DATA:  On 11/01, her creatinine was 2.50, estimated GFR 18.  Reviewing her historical laboratories, this appears to be her baseline.  On 11/07/2019, the creatinine was up to 2.74.  On admission, 11/19/2019, the creatinine was 3.88, and today it is 3.73 with an estimated GFR of 13.  Her albumin is low at 2.0.  Her current electrolytes show a potassium of 5.3 and a bicarbonate of 20.  Her hemoglobin is 8.8.  Her white count and platelet count are normal.  Previous evaluation for anemia showed in 06/2019 a normal B12 level and in 11/19 a normal folate level.  Several years back her iron stores were normal.      IMPRESSION:   1.  Chronic kidney disease, stage IV:  This is likely due to chronic rejection of her transplanted kidney.  Her baseline creatinine appears to be about 2.5.   2.  Renal transplant:  She is on prednisone and tacrolimus.  We can repeat a tacrolimus level in the morning.   3.  Acute kidney injury:  This is associated with a urinary tract infection.  I will check to see if she has had a recent ultrasound.  We want to make sure she is not obstructed given she has a Johnson catheter in place chronically, and again, we will check the tacrolimus level.  In the meantime, agree with hydration with normal saline and continuing her sodium bicarbonate.   4.  Anemia:  We will recheck her iron  stores and treat her with Aranesp as indicated.         STELLA JIMÉNEZ MD             D: 2019   T: 2019   MT:       Name:     MARYJANE SHAVER   MRN:      [id number]        Account:       [id number]   :      1945           Consult Date:  2019      Document: [id number]

## 2019-11-21 NOTE — PROVIDER NOTIFICATION
Text paged Dr. Moraes regarding patients agitation. When trying to reposition, provide oral cares, or give medications patient is trying to hit, and grab staff arms, and hands. Pt won't answer any questions when asked she will close her eyes and try to hit. Previous shift reported that she was oriented to self and place.    Per MD minimize cares for now, and leave sticky note for treatment team.

## 2019-11-21 NOTE — PROGRESS NOTES
Spoke with Dr. Valdes on floor. Okay for patient to come off of IMC. Patient will need Bipap if she becomes lethargic and sleepy, blood gases will be necessary at that time. Okay to hold Norvasc this AM due to SBP drop from 130's to 110 after IV labetalol was given.

## 2019-11-21 NOTE — PLAN OF CARE
Unable to assess mentation. Pt would open eyes to name, then close them. Agitated majority of night. Sitter at bedside to help with bipap. Johnson patent with adequate output. BS hypo. Incontinent of stool. Tried to repo throughout night, and provide oral care throughout night. Pt combative. VSS. On bipap. Bruising on BUL. Picc line bruised. Tele SR.

## 2019-11-21 NOTE — PROGRESS NOTES
Assessment and Plan:   ROB: associated with UTI.  Status post renal transplant with chronic kidney disease stage IV.  She is on tacrolimus and usually prednisone but now is on stress dose Solu-Cortef.  She is getting sodium bicarbonate but just 1 tablet/day.  She is on normal saline IV at 100 cc/h.    Labs show potassium 5.4, bicarbonate 16, creatinine has dropped from 3.88 down to 3.56.  Her phosphorus is elevated at 6.9, calcium low at 7.9.    Change IVF to contain bicarb. Add phos binder. Increase oral bicarb dose. Follow labs.             Interval History:   UTI: Chronic indwelling Johnson catheter.  ID service following.  She is on micafungin and Zosyn.  Hypertension: Amlodipine 10 mg/day, labetalol 300 mg twice daily  Diabetes  Obesity     Nonconversant          Medications:       amLODIPine  10 mg Oral Daily     aspirin  81 mg Oral Daily     aspirin  300 mg Rectal Daily     famotidine  20 mg Oral BID     hydrocortisone sodium succinate PF  50 mg Intravenous Q8H     insulin aspart  1-4 Units Subcutaneous Q4H     insulin glargine  10 Units Subcutaneous At Bedtime     labetalol  300 mg Oral BID     labetalol  10 mg Intravenous Q6H     lactobacillus rhamnosus (GG)  1 capsule Oral TID     levothyroxine  25 mcg Intravenous Q24H     levothyroxine  50 mcg Oral Daily     micafungin  100 mg Intravenous Q24H     pantoprazole (PROTONIX) IV  40 mg Intravenous Daily with breakfast     piperacillin-tazobactam  2.25 g Intravenous Q8H     pravastatin  10 mg Oral Daily     predniSONE  5 mg Oral Daily     sodium bicarbonate  650 mg Oral Daily     sodium chloride (PF)  3 mL Intracatheter Q8H     tacrolimus  2 mg Oral BID       - MEDICATION INSTRUCTIONS -       sodium chloride 100 mL/hr at 11/21/19 0358     Current active medications and PTA medications reviewed, see medication list for details.            Physical Exam:   Vitals were reviewed  Patient Vitals for the past 24 hrs:   BP Temp Temp src Pulse Heart Rate Resp  SpO2 Weight   19 0842 136/54 97.6  F (36.4  C) Axillary 89 80 9 100 % --   19 0600 -- -- -- -- -- -- -- 119.7 kg (263 lb 12.8 oz)   19 0402 126/82 98.3  F (36.8  C) Axillary 77 75 9 100 % --   19 0237 129/63 -- -- 81 82 17 100 % --   19 0012 133/60 -- -- 78 78 12 100 % --   19 2200 (!) 149/61 -- -- 76 77 12 100 % --   19 2030 (!) 141/48 -- -- -- 77 8 100 % --   19 1900 135/50 -- -- 79 78 14 100 % --   19 1453 (!) 175/78 -- -- -- 76 -- -- --   19 1301 (!) 175/68 97.9  F (36.6  C) Axillary -- 79 16 100 % --       Temp:  [97.6  F (36.4  C)-98.3  F (36.8  C)] 97.6  F (36.4  C)  Pulse:  [76-89] 89  Heart Rate:  [75-82] 80  Resp:  [8-17] 9  BP: (126-175)/(48-82) 136/54  SpO2:  [100 %] 100 %    Temperatures:  Current - Temp: 97.6  F (36.4  C); Max - Temp  Av.9  F (36.6  C)  Min: 97.6  F (36.4  C)  Max: 98.3  F (36.8  C)  Respiration range: Resp  Av.1  Min: 8  Max: 17  Pulse range: Pulse  Av  Min: 76  Max: 89  Blood pressure range: Systolic (24hrs), Av , Min:126 , Max:175   ; Diastolic (24hrs), Av, Min:48, Max:82    Pulse oximetry range: SpO2  Av %  Min: 100 %  Max: 100 %    I/O last 3 completed shifts:  In: 1489 [I.V.:1489]  Out: 1485 [Urine:1485]      Intake/Output Summary (Last 24 hours) at 2019 0936  Last data filed at 2019 0605  Gross per 24 hour   Intake 1489 ml   Output 1485 ml   Net 4 ml       Opens eyes, no conversation  Lungs were clear anterior breath sounds  Cardiac exam regular rhythm normal S1-S2 no murmur rub or gallop  Lower extremities 1+ edema  : Johnson catheter in place       Wt Readings from Last 4 Encounters:   19 119.7 kg (263 lb 12.8 oz)   19 103 kg (227 lb 1.2 oz)   19 103.9 kg (229 lb)   19 107.9 kg (237 lb 14 oz)          Data:          Lab Results   Component Value Date     2019     2019     2019    Lab Results   Component Value Date     CHLORIDE 117 11/21/2019    CHLORIDE 114 11/20/2019    CHLORIDE 113 11/19/2019    Lab Results   Component Value Date    BUN 60 11/21/2019    BUN 65 11/20/2019    BUN 58 11/19/2019      Lab Results   Component Value Date    POTASSIUM 5.4 11/21/2019    POTASSIUM 5.3 11/20/2019    POTASSIUM 5.3 11/19/2019    Lab Results   Component Value Date    CO2 16 11/21/2019    CO2 20 11/20/2019    CO2 21 11/19/2019    Lab Results   Component Value Date    CR 3.56 11/21/2019    CR 3.73 11/20/2019    CR 3.88 11/19/2019        Recent Labs   Lab Test 11/21/19  0610 11/20/19  0600 11/19/19  1327   WBC 7.0 7.9 6.3   HGB 8.8* 8.8* 8.9*   HCT 29.6* 29.6* 29.5*   MCV 97 96 96    155 155     Recent Labs   Lab Test 11/19/19  1235 10/31/19  1404 07/19/19  0832  05/29/18  0440  01/18/18  1903  09/03/13  0725   AST 12 10 10   < >  --    < >  --    < > 17   ALT <6 16 11   < >  --    < >  --    < > 27   ALKPHOS 69 89 90   < >  --    < >  --    < > 77   BILITOTAL <0.1* 0.5 0.3   < >  --    < >  --    < > 0.3   BILICONJ  --   --   --   --   --   --   --   --  0.0   NOREEN  --   --  18  --  <10*  --  13   < >  --     < > = values in this interval not displayed.       Recent Labs   Lab Test 03/05/19  0558 03/04/19  0432 03/03/19  2103   MAG 2.3 2.2 2.1     Recent Labs   Lab Test 11/21/19  0610 11/02/19  0552 03/05/19  0558   PHOS 6.9* 4.2 2.9     Recent Labs   Lab Test 11/21/19  0610 11/20/19  0600 11/19/19  1235   ROGER 7.9* 8.0* 8.0*       Lab Results   Component Value Date    ROGER 7.9 (L) 11/21/2019     Lab Results   Component Value Date    WBC 7.0 11/21/2019    HGB 8.8 (L) 11/21/2019    HCT 29.6 (L) 11/21/2019    MCV 97 11/21/2019     11/21/2019     Lab Results   Component Value Date     11/21/2019    POTASSIUM 5.4 (H) 11/21/2019    CHLORIDE 117 (H) 11/21/2019    CO2 16 (L) 11/21/2019     (H) 11/21/2019     Lab Results   Component Value Date    BUN 60 (H) 11/21/2019    CR 3.56 (H) 11/21/2019     Lab Results   Component  Value Date    MAG 2.3 03/05/2019     Lab Results   Component Value Date    PHOS 6.9 (H) 11/21/2019       Creatinine   Date Value Ref Range Status   11/21/2019 3.56 (H) 0.52 - 1.04 mg/dL Final   11/20/2019 3.73 (H) 0.52 - 1.04 mg/dL Final   11/19/2019 3.88 (H) 0.52 - 1.04 mg/dL Final   11/08/2019 2.60 (H) 0.52 - 1.04 mg/dL Final   11/07/2019 2.74 (H) 0.52 - 1.04 mg/dL Final   11/06/2019 2.76 (H) 0.52 - 1.04 mg/dL Final       Attestation:  I have reviewed today's vital signs, notes, medications, labs and imaging.     Wang Noble MD

## 2019-11-21 NOTE — PROGRESS NOTES
[facility] WOC Nurse Inpatient Wound Assessment   Initial Assessment of wound(s) on pt's:   Low, midline coccyx    WOC NURSE ASSESSMENT    Small split in the epidermis at the base of the coccyx due to moisture/ IAD with fungal vs pressure injury.  Recommend PIP and hygiene measures, see plan below  WOC NURSE TREATMENT PLAN    Plan of care for wound located low midline coccyx, effective now  Small split in the skin from moisture and fungal rash, leave open to the air  Clean TID and prn with Jermaine Cleanse and Protect  Dust with antifungal powder x 7 days, stopping on nove 27, 2019, then consider baby powder  Position side to side only when in bed,   Must have heels elevated   5/6 pillow for positioning at all times.     Nursing to notify Provider(s) and re-consult the WOC Nurse if wound(s) deteriorate or new skin concerns    WOC Nurse follow-up plan: weekly  OBJECTIVE DATA    Patient history according to provider notes:  74-year-old female with complex past medical history of hypertension, dyslipidemia, diabetes mellitus type 2, history of renal transplant on prednisone and tacrolimus with CKD, chronic indwelling Johnson catheter with recurrent catheter-associated UTIs who presented initially to [facility] to have a PICC line placed.  Throughout the day, patient became more lethargic and somnolent, which is similar to her prior presentations for recurrent UTIs, and she is admitted for further evaluation.     Albino Risk Assessment  Sensory Perception: 2-->very limited(antonio)    Moisture: 3-->occasionally moist   Activity: 1-->bedfast     Mobility: 1-->completely immobile   Nutrition: 2-->probably inadequate   Friction and Shear: 1-->problem      Positioning: Pillows,     Mattress:  Standard , Atmos Air mattress    Current diet  Orders Placed This Encounter      Moderate Consistent CHO Diet        Moisture Management:  Urinary Catheter  o Catheter secured? Yes     o I/O last 3 completed shifts:  o In: 6745  [I.V.:1489]  o Out: 1485 [Urine:1485]    Labs:   Recent Labs   Lab Test 11/21/19  0610  11/19/19  1415  11/01/19  0920   ALBUMIN 2.0*  --   --    < >  --    HGB 8.8*   < >  --    < > 10.4*   INR  --   --  1.12  --   --    WBC 7.0   < >  --    < > 6.9   A1C  --   --   --   --  5.8*   CRP  --   --   --   --  <2.9    < > = values in this interval not displayed.         [facility] NURSE PHYSICAL EXAM    Wound Assessment (location):   Low, midline coccyx  Wound History:  Pt requiring full cares, tissue with IAD and fungal rash  Wound is a small split, 1cm x 0.2cm x 0.2cm, small serous drainage ,no pain,   Date of last photo 11/21/2019        [facility] NURSE INTERVENTIONS    Assessed buttocks/ coccyx with RN and NA  Wound Care: cleaned with bath wipes, dusted with antifungal powder  Supplies: reviewed, gathered, discussed with RN and discussed with patient  Discussed plan of care with Patient and Nurse  Education provided: Moisture management, Hygiene and Off-loading pressure    Dereje Gomez RN

## 2019-11-21 NOTE — PLAN OF CARE
Tx from 88 @ 1700. VSS. Total care. Oriented to self/place. Nod her head for yes/no. Somnolent, open her eye for voice. Turns Q2, small open skin on coccyx, mapilex appiled, WOC RN consulted, pre exiting pressure ulcer per spouse. Bruises on R/L amrs. R arm picc dry drainage. R chest IV WDL. Bipap cont, tried to take bipap off times. Sitter @ bedside. NPO. Tele SR. Rash groins/abdo pannus area. Pulse set mattress ordered. Good UOP rico.

## 2019-11-21 NOTE — PROGRESS NOTES
[facility]    Infectious Disease Progress Note    Date of Service (when I saw the patient): 11/21/2019     Assessment & Plan   Gem Jade is a 74 year old female who was admitted on 11/19/2019.       Impression:  1. 74 y.o female very well known to me from prior multiple admissions.   2. Currently on iV ancef for a recent MSSA bacteremia.   3. Was in the hospital due to midline/ picc related issues when noted to be more somnolent than usual.   5. UA grossly abnormal , UC and BC pending.      Recommendations:   Started on zosyn which will still cover the MSSA bacteremia for which she needs to stay on another week of antibiotics, plus provides broader GNR coverage.   Candida in the cultures, usus ally ignored in the urine given the symptoms, renal transplant planning on treating, since on immunosuppressives such as tacrolimus avoiding fluc and will use micafingin instead.   Cath changed        Paramjit Mckenzie MD    Interval History   Same   Opens eye then sleeps     Physical Exam   Temp: 97.6  F (36.4  C) Temp src: Axillary BP: 136/54 Pulse: 89 Heart Rate: 80 Resp: 9 SpO2: 100 % O2 Device: BiPAP/CPAP Oxygen Delivery: 3 LPM  Vitals:    11/19/19 1311 11/20/19 0545 11/21/19 0600   Weight: 115.2 kg (253 lb 15.5 oz) 115.2 kg (254 lb) 119.7 kg (263 lb 12.8 oz)     Vital Signs with Ranges  Temp:  [97.6  F (36.4  C)-98.3  F (36.8  C)] 97.6  F (36.4  C)  Pulse:  [76-89] 89  Heart Rate:  [75-82] 80  Resp:  [8-17] 9  BP: (126-175)/(48-82) 136/54  SpO2:  [100 %] 100 %    Constitutional: Awake, alert, cooperative, no apparent distress  Lungs: Clear to auscultation bilaterally, no crackles or wheezing  Cardiovascular: Regular rate and rhythm, normal S1 and S2, and no murmur noted  Abdomen: Normal bowel sounds, soft, non-distended, non-tender  Skin: No rashes, no cyanosis, no edema  Other:    Medications     - MEDICATION INSTRUCTIONS -       sodium chloride 100 mL/hr at 11/21/19 0358       amLODIPine  10 mg Oral  Daily     aspirin  81 mg Oral Daily     aspirin  300 mg Rectal Daily     famotidine  20 mg Oral BID     hydrocortisone sodium succinate PF  50 mg Intravenous Q8H     insulin aspart  1-4 Units Subcutaneous Q4H     insulin glargine  10 Units Subcutaneous At Bedtime     labetalol  300 mg Oral BID     labetalol  10 mg Intravenous Q6H     lactobacillus rhamnosus (GG)  1 capsule Oral TID     levothyroxine  25 mcg Intravenous Q24H     levothyroxine  50 mcg Oral Daily     micafungin  100 mg Intravenous Q24H     pantoprazole (PROTONIX) IV  40 mg Intravenous Daily with breakfast     piperacillin-tazobactam  2.25 g Intravenous Q8H     pravastatin  10 mg Oral Daily     predniSONE  5 mg Oral Daily     sodium bicarbonate  650 mg Oral Daily     sodium chloride (PF)  3 mL Intracatheter Q8H     tacrolimus  2 mg Oral BID       Data   All microbiology laboratory data reviewed.  Recent Labs   Lab Test 11/21/19  0610 11/20/19  0600 11/19/19  1327   WBC 7.0 7.9 6.3   HGB 8.8* 8.8* 8.9*   HCT 29.6* 29.6* 29.5*   MCV 97 96 96    155 155     Recent Labs   Lab Test 11/21/19  0610 11/20/19  0600 11/19/19  1235   CR 3.56* 3.73* 3.88*     Recent Labs   Lab Test 05/25/16  2116   SED 53*     Recent Labs   Lab Test 11/19/19  1327 11/19/19  1245 11/19/19  1235 11/03/19 2008 11/02/19  1521 11/01/19  1549 11/01/19  1540 10/31/19  1404 10/31/19  1310   CULT No growth after 2 days 50,000 to 100,000 colonies/mL  Candida albicans / dubliniensis  Candida albicans and Candida dubliniensis are not routinely speciated  Susceptibility testing not routinely done  * No growth after 2 days No growth No growth No growth No growth No growth  Cultured on the 2nd day of incubation:  Staphylococcus aureus  *  Critical Value/Significant Value, preliminary result only, called to and read back by   Johana Robison RN 11/1/2019 @1100 HDP      (Note)  POSITIVE for STAPHYLOCOCCUS AUREUS and NEGATIVE for the mecA gene  (not MRSA) by [employer] nucleic acid  test. The mecA gene was  not detected. Final identification and antimicrobial susceptibility  testing will be verified by standard methods.    Specimen tested with [employer] multiplex, gram-positive blood culture  nucleic acid test for the following targets: Staph aureus, Staph  epidermidis, Staph lugdunensis, other Staph species, Enterococcus  faecalis, Enterococcus faecium, Streptococcus species, S. agalactiae,  S. anginosus grp., S. pneumoniae, S. pyogenes, Listeria sp., mecA  (methicillin resistance) and Viridiana/B (vancomycin resistance).    Critical Value/Significant Value called to and read back by GENE WONG AT 1350 11/1/19 [facility]   >100,000 colonies/mL  Citrobacter freundii complex  *  >100,000 colonies/mL  Staphylococcus aureus  *  >100,000 colonies/mL  urogenital santos  Susceptibility testing not routinely done         Attestation:  Total time on the floor involved in the patient's care: 35 minutes. Total time spent in counseling/care coordination: >50%

## 2019-11-22 NOTE — PROGRESS NOTES
[facility]    Infectious Disease Progress Note    Date of Service (when I saw the patient): 11/22/2019     Assessment & Plan   Gem Jade is a 74 year old female who was admitted on 11/19/2019.       Impression:  1. 74 y.o female very well known to me from prior multiple admissions.   2. Currently on iV ancef for a recent MSSA bacteremia.   3. Was in the hospital due to midline/ picc related issues when noted to be more somnolent than usual.   5. UA grossly abnormal , UC and BC pending.      Recommendations:   Started on zosyn which will still cover the MSSA bacteremia for which she needs to stay on another 6 days of antibiotics, plus provides broader GNR coverage.   Candida in the cultures, usually ignored in the urine but given the symptoms, and she is a renal transplant planning on treating, since on immunosuppressives such as tacrolimus avoiding fluc and will use micafingin instead. Planning on a short course.   Cath changed.     I would recommend staying in the hospital over the weekend.     Long discussion with  yesterday at bedside outlining the last month of different medical and other logistic issues patient and he has gone through.        Paramjit Mckenzie MD    Interval History   Awake today   remembers every this no longer confused      Physical Exam   Temp: 97.9  F (36.6  C) Temp src: Oral BP: (!) 155/59 Pulse: 80 Heart Rate: 69 Resp: 16 SpO2: 93 % O2 Device: None (Room air) Oxygen Delivery: 3 LPM  Vitals:    11/20/19 0545 11/21/19 0600 11/22/19 0600   Weight: 115.2 kg (254 lb) 119.7 kg (263 lb 12.8 oz) 118.8 kg (261 lb 14.5 oz)     Vital Signs with Ranges  Temp:  [97.7  F (36.5  C)-98.3  F (36.8  C)] 97.9  F (36.6  C)  Pulse:  [80-85] 80  Heart Rate:  [69-90] 69  Resp:  [10-16] 16  BP: (110-182)/(39-77) 155/59  SpO2:  [93 %-98 %] 93 %    Constitutional: Awake, alert, cooperative, no apparent distress  Lungs: Clear to auscultation bilaterally, no crackles or  wheezing  Cardiovascular: Regular rate and rhythm, normal S1 and S2, and no murmur noted  Abdomen: Normal bowel sounds, soft, non-distended, non-tender  Skin: No rashes, no cyanosis, no edema  Other:    Medications     IV infusion builder WITH additives 125 mL/hr at 11/22/19 0146     - MEDICATION INSTRUCTIONS -         amLODIPine  10 mg Oral Daily     aspirin  81 mg Oral Daily     calcium carbonate-vitamin D  1 tablet Oral TID w/meals     famotidine  20 mg Oral BID     insulin aspart  1-4 Units Subcutaneous 4x Daily w/meals     insulin glargine  10 Units Subcutaneous At Bedtime     labetalol  300 mg Oral BID     lactobacillus rhamnosus (GG)  1 capsule Oral TID     levothyroxine  50 mcg Oral Daily     micafungin  100 mg Intravenous Q24H     piperacillin-tazobactam  2.25 g Intravenous Q8H     pravastatin  10 mg Oral Daily     predniSONE  10 mg Oral Daily     sodium bicarbonate  1,300 mg Oral BID     sodium chloride (PF)  3 mL Intracatheter Q8H     tacrolimus  2 mg Oral BID       Data   All microbiology laboratory data reviewed.  Recent Labs   Lab Test 11/21/19  0610 11/20/19  0600 11/19/19  1327   WBC 7.0 7.9 6.3   HGB 8.8* 8.8* 8.9*   HCT 29.6* 29.6* 29.5*   MCV 97 96 96    155 155     Recent Labs   Lab Test 11/22/19  0540 11/21/19  0610 11/20/19  0600   CR 3.46* 3.56* 3.73*     Recent Labs   Lab Test 05/25/16  2116   SED 53*     Recent Labs   Lab Test 11/19/19  1327 11/19/19  1245 11/19/19  1235 11/03/19 2008 11/02/19  1521 11/01/19  1549 11/01/19  1540 10/31/19  1404 10/31/19  1310   CULT No growth after 3 days 50,000 to 100,000 colonies/mL  Candida albicans / dubliniensis  Candida albicans and Candida dubliniensis are not routinely speciated  Susceptibility testing not routinely done  * No growth after 3 days No growth No growth No growth No growth No growth  Cultured on the 2nd day of incubation:  Staphylococcus aureus  *  Critical Value/Significant Value, preliminary result only, called to and read  back by   Johana Robison RN 11/1/2019 @1100 HDP      (Note)  POSITIVE for STAPHYLOCOCCUS AUREUS and NEGATIVE for the mecA gene  (not MRSA) by [employer] multiplex nucleic acid test. The mecA gene was  not detected. Final identification and antimicrobial susceptibility  testing will be verified by standard methods.    Specimen tested with Verigene multiplex, gram-positive blood culture  nucleic acid test for the following targets: Staph aureus, Staph  epidermidis, Staph lugdunensis, other Staph species, Enterococcus  faecalis, Enterococcus faecium, Streptococcus species, S. agalactiae,  S. anginosus grp., S. pneumoniae, S. pyogenes, Listeria sp., mecA  (methicillin resistance) and Viridiana/B (vancomycin resistance).    Critical Value/Significant Value called to and read back by GENE WONG AT 1350 11/1/19 [facility]   >100,000 colonies/mL  Citrobacter freundii complex  *  >100,000 colonies/mL  Staphylococcus aureus  *  >100,000 colonies/mL  urogenital santos  Susceptibility testing not routinely done         Attestation:  Total time on the floor involved in the patient's care: 35 minutes. Total time spent in counseling/care coordination: >50%

## 2019-11-22 NOTE — PROGRESS NOTES
Assessment and Plan:   Chronic kidney disease stage IV, acute kidney injury: Creatinine slowly improving.  Electrolytes show potassium 4.3 and bicarbonate 19.  Calcium 7.7.    I/O 703/760.    Weights (last 365 days)     Date/Time Weight Who   11/22/19 0600 118.8 kg (261 lb 14.5 oz) NA   11/21/19 0600 119.7 kg (263 lb 12.8 oz) ES   11/20/19 0545 115.2 kg (254 lb) AS   11/19/19 1311 115.2 kg (253 lb 15.5 oz)          We started her on calcium plus D with meals as a phosphate binder.  She is also on oral sodium bicarbonate.  She is getting D5 half-normal saline with 100 mEq/L of bicarb as a maintenance IV.            Interval History:   Hypertension: She is on amlodipine, labetalol.  Average blood pressure 153/62.   Status post renal transplant: Chronic kidney disease stage IV.   She is on prednisone 10 mg/day and tacrolimus 2 mg twice a day.   UTI: She is on Zosyn and micafungin.  Diabetes  Obesity           Review of Systems:   Complain of pain in the sacral area.  No shortness of breath or chest pain.  Starting to take p.o.  No nausea or vomiting.  Review of systems otherwise negative          Medications:       amLODIPine  10 mg Oral Daily     aspirin  81 mg Oral Daily     calcium carbonate-vitamin D  1 tablet Oral TID w/meals     famotidine  20 mg Oral BID     insulin aspart  1-4 Units Subcutaneous 4x Daily w/meals     insulin glargine  10 Units Subcutaneous At Bedtime     labetalol  300 mg Oral BID     lactobacillus rhamnosus (GG)  1 capsule Oral TID     levothyroxine  50 mcg Oral Daily     micafungin  100 mg Intravenous Q24H     piperacillin-tazobactam  2.25 g Intravenous Q8H     pravastatin  10 mg Oral Daily     predniSONE  10 mg Oral Daily     sodium bicarbonate  1,300 mg Oral BID     sodium chloride (PF)  3 mL Intracatheter Q8H     tacrolimus  2 mg Oral BID       IV infusion builder WITH additives 125 mL/hr at 11/22/19 0146     - MEDICATION INSTRUCTIONS -       Current active medications and PTA  medications reviewed, see medication list for details.            Physical Exam:   Vitals were reviewed  Patient Vitals for the past 24 hrs:   BP Temp Temp src Pulse Heart Rate Resp SpO2 Weight   19 0859 (!) 155/59 97.9  F (36.6  C) Oral -- 69 16 93 % --   19 0600 -- -- -- -- -- -- -- 118.8 kg (261 lb 14.5 oz)   19 0500 (!) 160/62 97.7  F (36.5  C) Axillary -- 70 14 97 % --   19 0113 (!) 155/63 98.3  F (36.8  C) Axillary -- 77 14 97 % --   19 2045 (!) 182/73 -- -- -- 88 -- -- --   19 1958 (!) 171/77 98  F (36.7  C) Oral -- 90 -- 95 % --   19 1611 137/58 98  F (36.7  C) Axillary 80 83 -- 94 % --   19 1200 (!) 110/39 98  F (36.7  C) Axillary 85 84 10 98 % --       Temp:  [97.7  F (36.5  C)-98.3  F (36.8  C)] 97.9  F (36.6  C)  Pulse:  [80-85] 80  Heart Rate:  [69-90] 69  Resp:  [10-16] 16  BP: (110-182)/(39-77) 155/59  SpO2:  [93 %-98 %] 93 %    Temperatures:  Current - Temp: 97.9  F (36.6  C); Max - Temp  Av  F (36.7  C)  Min: 97.7  F (36.5  C)  Max: 98.3  F (36.8  C)  Respiration range: Resp  Av.5  Min: 10  Max: 16  Pulse range: Pulse  Av.5  Min: 80  Max: 85  Blood pressure range: Systolic (24hrs), Av , Min:110 , Max:182   ; Diastolic (24hrs), Av, Min:39, Max:77    Pulse oximetry range: SpO2  Av.7 %  Min: 93 %  Max: 98 %    I/O last 3 completed shifts:  In: 0   Out: 800 [Urine:800]      Intake/Output Summary (Last 24 hours) at 2019 1013  Last data filed at 2019 1000  Gross per 24 hour   Intake 0 ml   Output 1000 ml   Net -1000 ml     Much more alert and talkative today  Lungs are clear anterior breath sounds  Cardiac exam regular rhythm normal S1-S2 no murmur rub or gallop  Johnson catheter in place  Lower extremities no edema       Wt Readings from Last 4 Encounters:   19 118.8 kg (261 lb 14.5 oz)   19 103 kg (227 lb 1.2 oz)   19 103.9 kg (229 lb)   19 107.9 kg (237 lb 14 oz)          Data:          Lab  Results   Component Value Date     11/22/2019     11/21/2019     11/20/2019    Lab Results   Component Value Date    CHLORIDE 111 11/22/2019    CHLORIDE 117 11/21/2019    CHLORIDE 114 11/20/2019      Lab Results   Component Value Date    BUN 60 11/22/2019    BUN 60 11/21/2019    BUN 65 11/20/2019      Lab Results   Component Value Date    POTASSIUM 4.3 11/22/2019    POTASSIUM 5.4 11/21/2019    POTASSIUM 5.3 11/20/2019    Lab Results   Component Value Date    CO2 19 11/22/2019    CO2 16 11/21/2019    CO2 20 11/20/2019    Lab Results   Component Value Date    CR 3.46 11/22/2019    CR 3.56 11/21/2019    CR 3.73 11/20/2019        Recent Labs   Lab Test 11/21/19  0610 11/20/19  0600 11/19/19  1327   WBC 7.0 7.9 6.3   HGB 8.8* 8.8* 8.9*   HCT 29.6* 29.6* 29.5*   MCV 97 96 96    155 155     Recent Labs   Lab Test 11/19/19  1235 10/31/19  1404 07/19/19  0832  05/29/18  0440  01/18/18  1903  09/03/13  0725   AST 12 10 10   < >  --    < >  --    < > 17   ALT <6 16 11   < >  --    < >  --    < > 27   ALKPHOS 69 89 90   < >  --    < >  --    < > 77   BILITOTAL <0.1* 0.5 0.3   < >  --    < >  --    < > 0.3   BILICONJ  --   --   --   --   --   --   --   --  0.0   NOREEN  --   --  18  --  <10*  --  13   < >  --     < > = values in this interval not displayed.       Recent Labs   Lab Test 03/05/19  0558 03/04/19  0432 03/03/19  2103   MAG 2.3 2.2 2.1     Recent Labs   Lab Test 11/21/19  0610 11/02/19  0552 03/05/19  0558   PHOS 6.9* 4.2 2.9     Recent Labs   Lab Test 11/22/19  0540 11/21/19  0610 11/20/19  0600   ROGER 7.7* 7.9* 8.0*       Lab Results   Component Value Date    ROGER 7.7 (L) 11/22/2019     Lab Results   Component Value Date    WBC 7.0 11/21/2019    HGB 8.8 (L) 11/21/2019    HCT 29.6 (L) 11/21/2019    MCV 97 11/21/2019     11/21/2019     Lab Results   Component Value Date     11/22/2019    POTASSIUM 4.3 11/22/2019    CHLORIDE 111 (H) 11/22/2019    CO2 19 (L) 11/22/2019     (H)  11/22/2019     Lab Results   Component Value Date    BUN 60 (H) 11/22/2019    CR 3.46 (H) 11/22/2019     Lab Results   Component Value Date    MAG 2.3 03/05/2019     Lab Results   Component Value Date    PHOS 6.9 (H) 11/21/2019       Creatinine   Date Value Ref Range Status   11/22/2019 3.46 (H) 0.52 - 1.04 mg/dL Final   11/21/2019 3.56 (H) 0.52 - 1.04 mg/dL Final   11/20/2019 3.73 (H) 0.52 - 1.04 mg/dL Final   11/19/2019 3.88 (H) 0.52 - 1.04 mg/dL Final   11/08/2019 2.60 (H) 0.52 - 1.04 mg/dL Final   11/07/2019 2.74 (H) 0.52 - 1.04 mg/dL Final       Attestation:  I have reviewed today's vital signs, notes, medications, labs and imaging.     Wang Noble MD

## 2019-11-22 NOTE — PLAN OF CARE
VSS on room air, CPAP NOC. A/Ox3, disoriented to situation. Right PICC ecchymotic, okay'ed to use by IV team, draws and infuses. Denies pain. Turned and repositioned q2h, pulsate mattress placed. Pressure injury on coccyx YIMI per plan of care.  Mod carb diet, ryan well. BS faint, Flatus +. Voiding to rico, cloudy urine. LS diminished.

## 2019-11-22 NOTE — PLAN OF CARE
A/O x4. VSS on RA, hypertensive at times. Wore home CPAP over night. PICC line R arm bruised, infusing w/ blood return noted. Chronic Johnson in place with adequate output. Blanchable redness on coccyx. Redness in groin skin folds, powder applied. BLE 2+ edema. LS diminished. Tolerating mod CHO diet. Up x2 w/ lift. Turn and repo.

## 2019-11-22 NOTE — PROGRESS NOTES
[facility]    Medicine Progress Note - Hospitalist Service       Date of Admission:  11/19/2019  Assessment & Plan   Ms. Gem Jade is a pleasant 74-year-old female with complex past medical history of hypertension, dyslipidemia, diabetes mellitus type 2, history of renal transplant on prednisone and tacrolimus with CKD, chronic indwelling Johnson catheter with recurrent catheter-associated UTIs who presented initially to [facility] to have a PICC line placed.  Throughout the day, patient became more lethargic and somnolent, which is similar to her prior presentations for recurrent UTIs, and she is admitted for further evaluation.       Recurrent catheter-associated urinary tract infections.    UC - candida  She has a chronic Johnson indwelling catheter.  Last hospitalization was from 10/31 to 11/08/2019.  Her urine cultures were positive for Citrobacter and MSSA.  She also bacteremic with MSSA.  She was treated with Zosyn in the hospital and switched to Ancef as per ID to continue for 3 more weeks for a total of 4 weeks of antibiotics.  She was discharged with Medline in place.  Apparently Medline did not work properly.  It was replaced.  Even a second Medline did not work properly and she was asked to come to ER to have a PICC line placed.  IV team tried not successful to place a PICC line earlier today and eventually she had a PICC line placed by IR.    - Grossly abnormal UA with , large LE, moderate blood  - No reported fever, initial lactic acid 0.8, initially did have altered mental status-similar to prior hospitalizations  - Obvious concern for incomplete treatment given prior issues with midline and home IV ABX dosing  - Culture showing 50-100k candida - micafungin continued  - Zosyn continued-ID following, appreciated - ID recs at least in hospital for the weekend  - Continues to be afebrile 11/22    History of obstructive sleep apnea   - order CPAP as per home  "settings.   - Did require soft wrist restraints last night due to agitation which she recalls today as she \"did not want to be here\".    - 11/22 - last 2 nights has been compliant with home CPAP, no agitation or aggression with staff    Acute encephalopathy, metabolic - improved  likely related to her infection and worsening renal function and possible underlying severe sleep apnea contributing:  It seems that she had similar presentations in the past associated with her UTIs.  During her most recent hospitalization in the beginning of November, she had also problems with delirium.  She was seen by Psychiatry.  It seems that the delirium resolved.   - improved/normalized with treatment of UTI and IV fluids     Hypertension    Her blood pressure was on the higher side initially.    - PTA labetalol 300 bid and amlodipine 10 continued  - PTA prazosin yet to be resumed    Acute kidney injury on chronic kidney disease, stage III to IV.   - baseline somewhere around 2.5,   - 11/22 creat 3.4 down from 3.5 down from peak of 3.8  - appreciate nephrology assistance and managing IVF    Mild hyperkalemia- resolved  - 5.4, fluids ongoing, nephrology following  - normalized to 4.3 11/22     History of renal transplant    Her baseline creatinine had been between 1.5-1.8 earlier this year more recently 2.5.  More recently between 2.4-2.6.  She presented with a creatinine of 3.88 which is clearly above her baseline.    - resume her prior to admission prednisone 5 mg p.o. daily (increased for now to 10) and tacrolimus 2 mg p.o. twice daily.       Dyslipidemia  - Hold prior to admission Pravachol; can be resumed later or at discharge     Hypothyroidism    - PTA levothyroxine po resumed     Diabetes mellitus type 2, insulin-dependent:  At home she had maintained on Lantus 20 units subcutaneously at bedtime and insulin NovoLog 10 units with meals and sliding scale.    - A1d 5.8% 11/01/2019  - Lantus 13 daily + ssi for now (was 10 " unit(s) yesterday)  - POC sugars QID cml hs     History of paroxysmal atrial fibrillation, not on anticoagulation prior to admission and continue her prior to admission aspirin.      History of gastroesophageal reflux disease   We will continue her prior to admission famotidine.     Diet: Moderate Consistent CHO Diet    DVT Prophylaxis: Pneumatic Compression Devices  Johnson Catheter: in place, indication: Other (Comment), (Chronic )  Code Status: Full Code      Disposition Plan   Expected discharge: 3+ days pending fernanda improvement, uti treatment and ID abx plan  Entered: Santosh Valdes MD 11/22/2019, 5:22 PM       The patient's care was discussed with the Bedside Nurse, Patient, Patient's Family and ID MD.    Santosh Valdes MD  Hospitalist Service  [facility]    ______________________________________________________________________    Interval History   Seen and examined. Continues to be alert and oriented. No new complaints. No f/c/sob or pain. Creat a little better again. Continues on zosyn and micafungin.    Data reviewed today: I reviewed all medications, new labs and imaging results over the last 24 hours. I personally reviewed no images or EKG's today.    Physical Exam   Vital Signs: Temp: 97.9  F (36.6  C) Temp src: Oral BP: (!) 143/52   Heart Rate: 72 Resp: 16 SpO2: 91 % O2 Device: None (Room air) Oxygen Delivery: 3 LPM  Weight: 261 lbs 14.5 oz    Gen: NAD, pleasant, obese  HEENT: Normocephalic, EOMI, MMM  Resp: no crackles,  no wheezes, no increased work of resp  CV: S1S2 heard, reg rhythm, reg rate  Abdo: soft, nontender, nondistended, bowel sounds present  Ext: calves nontender, well perfused  Neuro: AAOx3, CN grossly intact, no facial asymmetry      Data   Recent Labs   Lab 11/22/19  0540 11/21/19  0610 11/20/19  0600 11/19/19  1415 11/19/19  1327 11/19/19  1235   WBC  --  7.0 7.9  --  6.3  --    HGB  --  8.8* 8.8*  --  8.9*  --    MCV  --  97 96  --  96  --    PLT  --  151 155  --   155  --    INR  --   --   --  1.12  --   --     141 139  --   --  138   POTASSIUM 4.3 5.4* 5.3  --   --  5.3   CHLORIDE 111* 117* 114*  --   --  113*   CO2 19* 16* 20  --   --  21   BUN 60* 60* 65*  --   --  58*   CR 3.46* 3.56* 3.73*  --   --  3.88*   ANIONGAP 7 8 5  --   --  4   ROGER 7.7* 7.9* 8.0*  --   --  8.0*   * 131* 109*  --   --  204*   ALBUMIN  --  2.0*  --   --   --  2.0*   PROTTOTAL  --   --   --   --   --  6.5*   BILITOTAL  --   --   --   --   --  <0.1*   ALKPHOS  --   --   --   --   --  69   ALT  --   --   --   --   --  <6   AST  --   --   --   --   --  12     No results found for this or any previous visit (from the past 24 hour(s)).

## 2019-11-23 NOTE — PROGRESS NOTES
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 ROB associated with UTI.   On NaHCO3 po and IV. Pred 10 mg per day, tacrolimus 2 mg bid. On Ca + D as phos binder.  D51/2 with 100 of bicarb at 125. Cr continues to decrease. Phos 5.2. K 3.9 and HCO3 24.    UO yest 1050 ml.     Stop IVF. Monitor labs. She can be discharged per IM with F/U labs next week and F/U at  [facility] next [facility].             Interval History:   UTI: micafungin and zosyn.   HT: norvasc and labetalol.   DM  Obesity                 Review of Systems:   C/O mild SOB. No chest or abd pain. Johnson working well. Taking po without problems.           Medications:       amLODIPine  10 mg Oral Daily     aspirin  81 mg Oral Daily     calcium carbonate-vitamin D  1 tablet Oral TID w/meals     famotidine  20 mg Oral BID     insulin aspart  1-4 Units Subcutaneous 4x Daily w/meals     insulin glargine  13 Units Subcutaneous At Bedtime     labetalol  300 mg Oral BID     lactobacillus rhamnosus (GG)  1 capsule Oral TID     levothyroxine  50 mcg Oral Daily     micafungin  100 mg Intravenous Q24H     piperacillin-tazobactam  2.25 g Intravenous Q8H     pravastatin  10 mg Oral Daily     predniSONE  10 mg Oral Daily     sodium bicarbonate  1,300 mg Oral BID     sodium chloride (PF)  3 mL Intracatheter Q8H     tacrolimus  2 mg Oral BID       IV infusion builder WITH additives 125 mL/hr at 11/23/19 0238     - MEDICATION INSTRUCTIONS -       Current active medications and PTA medications reviewed, see medication list for details.            Physical Exam:   Vitals were reviewed  Patient Vitals for the past 24 hrs:   BP Temp Temp src Pulse Heart Rate Resp SpO2 Weight   11/23/19 1007 (!) 149/46 -- -- -- 72 18 -- --   11/23/19 0752 137/46 98  F (36.7  C) Oral 72 -- 18 95 % --   11/23/19 0438 126/50 98  F (36.7  C) Oral 69 -- 18 96 % 111.1 kg (244 lb 14.9 oz)   11/22/19 2155 (!) 142/58 -- -- 74 -- -- -- --   11/22/19 1938 (!)  145/48 98.2  F (36.8  C) Oral -- 75 16 92 % --   19 1643 (!) 143/52 97.9  F (36.6  C) Oral -- 72 16 91 % --   19 1243 (P) 137/82 (P) 97.8  F (36.6  C) (P) Oral -- (P) 63 (P) 16 (P) 95 % --       Temp:  [97.8  F (36.6  C)-98.2  F (36.8  C)] 98  F (36.7  C)  Pulse:  [69-74] 72  Heart Rate:  [63-75] 72  Resp:  [16-18] 18  BP: (126-149)/(46-58) 149/46  SpO2:  [91 %-96 %] 95 %    Temperatures:  Current - Temp: 98  F (36.7  C); Max - Temp  Av  F (36.7  C)  Min: 97.8  F (36.6  C)  Max: 98.2  F (36.8  C)  Respiration range: Resp  Av  Min: 16  Max: 18  Pulse range: Pulse  Av.7  Min: 69  Max: 74  Blood pressure range: Systolic (24hrs), Av , Min:126 , Max:149   ; Diastolic (24hrs), Av, Min:46, Max:82    Pulse oximetry range: SpO2  Av.8 %  Min: 91 %  Max: 96 %    I/O last 3 completed shifts:  In: -   Out: 800 [Urine:800]      Intake/Output Summary (Last 24 hours) at 2019 1016  Last data filed at 2019 0702  Gross per 24 hour   Intake --   Output 1200 ml   Net -1200 ml       Alert and responsive  Lungs with clear ant BS  Cor RRR nl S1 S2 no M  LE 1+ edema  Johnson with clear urine       Wt Readings from Last 4 Encounters:   19 111.1 kg (244 lb 14.9 oz)   19 103 kg (227 lb 1.2 oz)   19 103.9 kg (229 lb)   19 107.9 kg (237 lb 14 oz)          Data:          Lab Results   Component Value Date     2019     2019     2019      Lab Results   Component Value Date    CHLORIDE 107 2019    CHLORIDE 111 2019    CHLORIDE 117 2019    Lab Results   Component Value Date    BUN 50 2019    BUN 60 2019    BUN 60 2019      Lab Results   Component Value Date    POTASSIUM 3.9 2019    POTASSIUM 4.3 2019    POTASSIUM 5.4 2019    Lab Results   Component Value Date    CO2 24 2019    CO2 19 2019    CO2 16 2019    Lab Results   Component Value Date    CR 3.30 2019    CR  3.46 11/22/2019    CR 3.56 11/21/2019        Recent Labs   Lab Test 11/21/19  0610 11/20/19  0600 11/19/19  1327   WBC 7.0 7.9 6.3   HGB 8.8* 8.8* 8.9*   HCT 29.6* 29.6* 29.5*   MCV 97 96 96    155 155     Recent Labs   Lab Test 11/23/19  0545 11/19/19  1235 10/31/19  1404 07/19/19  0832  05/29/18  0440  01/18/18  1903  09/03/13  0725   AST 6 12 10 10   < >  --    < >  --    < > 17   ALT <6 <6 16 11   < >  --    < >  --    < > 27   ALKPHOS 49 69 89 90   < >  --    < >  --    < > 77   BILITOTAL 0.2 <0.1* 0.5 0.3   < >  --    < >  --    < > 0.3   BILICONJ  --   --   --   --   --   --   --   --   --  0.0   NOREEN  --   --   --  18  --  <10*  --  13   < >  --     < > = values in this interval not displayed.       Recent Labs   Lab Test 03/05/19  0558 03/04/19  0432 03/03/19  2103   MAG 2.3 2.2 2.1     Recent Labs   Lab Test 11/23/19  0545 11/21/19  0610 11/02/19  0552   PHOS 5.2* 6.9* 4.2     Recent Labs   Lab Test 11/23/19  0545 11/22/19  0540 11/21/19  0610   ROGER 7.9* 7.7* 7.9*       Lab Results   Component Value Date    ROGER 7.9 (L) 11/23/2019     Lab Results   Component Value Date    WBC 7.0 11/21/2019    HGB 8.8 (L) 11/21/2019    HCT 29.6 (L) 11/21/2019    MCV 97 11/21/2019     11/21/2019     Lab Results   Component Value Date     11/23/2019    POTASSIUM 3.9 11/23/2019    CHLORIDE 107 11/23/2019    CO2 24 11/23/2019     (H) 11/23/2019     Lab Results   Component Value Date    BUN 50 (H) 11/23/2019    CR 3.30 (H) 11/23/2019     Lab Results   Component Value Date    MAG 2.3 03/05/2019     Lab Results   Component Value Date    PHOS 5.2 (H) 11/23/2019       Creatinine   Date Value Ref Range Status   11/23/2019 3.30 (H) 0.52 - 1.04 mg/dL Final   11/22/2019 3.46 (H) 0.52 - 1.04 mg/dL Final   11/21/2019 3.56 (H) 0.52 - 1.04 mg/dL Final   11/20/2019 3.73 (H) 0.52 - 1.04 mg/dL Final   11/19/2019 3.88 (H) 0.52 - 1.04 mg/dL Final   11/08/2019 2.60 (H) 0.52 - 1.04 mg/dL Final       Attestation:  I have  reviewed today's vital signs, notes, medications, labs and imaging.     Wang Noble MD

## 2019-11-23 NOTE — PLAN OF CARE
Discharge Planner OT   Patient plan for discharge: uncertain  Current status: OT orders received and chart reviewed. Per chart review, pt w/ multiple readmissions, is bed bound, and requires a mechanical lift at baseline. No skilled IP OT needs identified. Will complete OT order  Barriers to return to prior living situation: defer to medical team  Recommendations for discharge: defer to medical team  Rationale for recommendations: No skilled IP OT needs as pt has A w/ all ADL's and is bedbound/uses mechanical lift at baseline.       Entered by: Jessica Fernandez 11/23/2019 7:16 AM

## 2019-11-23 NOTE — PLAN OF CARE
Vital signs stable on RA. A/Ox4. LS diminished. Incontinent of 1 loose brown BM. Mod cho diet, good intake. Chronic rico w/ adequate output. Urine noted to be pink tinged around 10 AM, Mds aware. Has become more yellow throughout shift. Continues to be cloudy.     R PICC infusing IVF. Intermittent abx as ordered. Bruised but nontender, infuses and draws well. IV team following.   Turn repo q2 with assist of 2. Coccyx YIMI, antifungal powder used throughout brandie area. R skin fold noted to have breakdown, placed interdry.

## 2019-11-23 NOTE — PLAN OF CARE
VSS on RA. A/Ox4. Pt has baseline numbness/tingeling in LE's. Blood sugars checks. Assist of 2. Tolerating mod CHO diet. Denied N&V. +gas, +bm. Johnson. LS diminished. MD notified AM shift 11/22 of pink tinted urine output. Will continue to monitor.

## 2019-11-23 NOTE — PROGRESS NOTES
[facility]    Medicine Progress Note - Hospitalist Service       Date of Admission:  11/19/2019  Assessment & Plan   Ms. Gem Jade is a pleasant 74-year-old female with complex past medical history of hypertension, dyslipidemia, diabetes mellitus type 2, history of renal transplant on prednisone and tacrolimus with CKD, chronic indwelling Johnson catheter with recurrent catheter-associated UTIs who presented initially to [facility] to have a PICC line placed.  Throughout the day of presentation, patient became more lethargic and somnolent, which is similar to her prior presentations for recurrent UTIs, and she is admitted for further evaluation.       Recurrent catheter-associated urinary tract infections.    UC - candida  She has a chronic Johnson indwelling catheter.  Last hospitalization was from 10/31 to 11/08/2019.  Her urine cultures were positive for Citrobacter and MSSA.  She was also bacteremic with MSSA.  She was treated with Zosyn in the hospital and switched to Ancef as per ID to continue for 3 more weeks for a total of 4 weeks of antibiotics.  She was discharged with Medline in place.  Apparently Medline did not work properly.  It was replaced.  Even a second Medline did not work properly and she was asked to come to ER to have a PICC line placed.  IV team tried not successful to place a PICC line earlier today and eventually she had a PICC line placed by IR.    - Grossly abnormal UA with , large LE, moderate blood  - No reported fever, initial lactic acid 0.8, initially did have altered mental status-similar to prior hospitalizations  - Obvious concern for incomplete treatment given prior issues with midline and home IV ABX dosing  - Culture showing 50-100k candida - micafungin per ID (opted to treat given immunocompromised state)  - Zosyn continued-ID following, appreciated - ID recs at least in hospital for the weekend  - Continues to be afebrile  "11/22    History of obstructive sleep apnea   - order CPAP as per home settings.   - Did require soft wrist restraints initially due to agitation which she recalls today as she \"did not want to be here\".    - 11/23 - last few nights has been compliant with home CPAP, no agitation or aggression with staff    Acute encephalopathy, metabolic - improved  likely related to her infection and worsening renal function and possible underlying severe sleep apnea contributing:  It seems that she had similar presentations in the past associated with her UTIs.  During her most recent hospitalization in the beginning of November, she had also problems with delirium.  She was seen by Psychiatry.  It seems that the delirium resolved.   - improved/normalized with treatment of UTI and IV fluids     Hypertension    Her blood pressure was on the higher side initially.    - PTA labetalol 300 bid and amlodipine 10 continued  - PTA prazosin resumed    Acute kidney injury on chronic kidney disease, stage III to IV.   - baseline somewhere around 2.5,   - creatinine improving 11/23: 3.3 (peak was 3.8)  - appreciate nephrology assistance and managing IVF. IVF stopped today and Nephrology has signed off. Recommend follow up with  [facility] next week with labs prior.    Mild hyperkalemia- resolved     History of renal transplant    Her baseline creatinine had been between 1.5-1.8 earlier this year more recently 2.5.  More recently between 2.4-2.6.  She presented with a creatinine of 3.88 which is clearly above her baseline.    - resumed her prior to admission prednisone 5 mg p.o. daily and tacrolimus 2 mg p.o. twice daily.       Dyslipidemia  - Hold prior to admission Pravachol; can be resumed later or at discharge     Hypothyroidism    - PTA levothyroxine po resumed     Diabetes mellitus type 2, insulin-dependent:  At home she had maintained on Lantus 20 units subcutaneously at bedtime and insulin NovoLog 10 units with meals and " sliding scale.    - A1d 5.8% 11/01/2019  - Increase to  Lantus 18 daily + ssi for now (was 13 unit(s) yesterday)  - POC sugars QID cml hs     History of paroxysmal atrial fibrillation, not on anticoagulation prior to admission and continue her prior to admission aspirin.      History of gastroesophageal reflux disease   We will continue her prior to admission famotidine.     Diet: Moderate Consistent CHO Diet    DVT Prophylaxis: Pneumatic Compression Devices  Johnson Catheter: in place, indication: Other (Comment), (Chronic )  Code Status: Full Code      Disposition Plan   Expected discharge: 2+ days pending fernanda improvement, uti treatment and ID abx plan  Entered: Katie Cooney MD 11/23/2019, 3:27 PM       The patient's care was discussed with the Bedside Nurse, Patient, Patient's Family and ID MD.    Katie Cooney MD  Hospitalist Service  [facility]    ______________________________________________________________________    Interval History   Seen and examined. Continues to be alert and oriented. No new complaints. No f/c/sob . She tells me about her skin breakdown in her groin folds and also on her back and groin folds get painful at times . Continues on zosyn and micafungin.    Data reviewed today: I reviewed all medications, new labs and imaging results over the last 24 hours. I personally reviewed no images or EKG's today.    Physical Exam   Vital Signs: Temp: 98.1  F (36.7  C) Temp src: Oral BP: 132/43 Pulse: 72 Heart Rate: 72 Resp: 18 SpO2: 90 % O2 Device: None (Room air)    Weight: 244 lbs 14.9 oz    Gen: NAD, pleasant, obese  HEENT: Normocephalic, EOMI, MMM  Resp: no crackles,  no wheezes, no increased work of resp  CV: S1S2 heard, reg rhythm, reg rate  Abdo: soft, nontender, nondistended, bowel sounds present  Ext: calves nontender, well perfused  Neuro: AAOx3, CN grossly intact, no facial asymmetry  Skin: superficial skin breakdown in bilateral groin folds      Data   Recent Labs   Lab  11/23/19  0545 11/22/19  0540 11/21/19  0610 11/20/19  0600 11/19/19  1415 11/19/19  1327 11/19/19  1235   WBC  --   --  7.0 7.9  --  6.3  --    HGB  --   --  8.8* 8.8*  --  8.9*  --    MCV  --   --  97 96  --  96  --    PLT  --   --  151 155  --  155  --    INR  --   --   --   --  1.12  --   --     137 141 139  --   --  138   POTASSIUM 3.9 4.3 5.4* 5.3  --   --  5.3   CHLORIDE 107 111* 117* 114*  --   --  113*   CO2 24 19* 16* 20  --   --  21   BUN 50* 60* 60* 65*  --   --  58*   CR 3.30* 3.46* 3.56* 3.73*  --   --  3.88*   ANIONGAP 6 7 8 5  --   --  4   ROGER 7.9* 7.7* 7.9* 8.0*  --   --  8.0*   * 309* 131* 109*  --   --  204*   ALBUMIN 1.9*  --  2.0*  --   --   --  2.0*   PROTTOTAL 6.0*  --   --   --   --   --  6.5*   BILITOTAL 0.2  --   --   --   --   --  <0.1*   ALKPHOS 49  --   --   --   --   --  69   ALT <6  --   --   --   --   --  <6   AST 6  --   --   --   --   --  12     No results found for this or any previous visit (from the past 24 hour(s)).

## 2019-11-24 NOTE — PROGRESS NOTES
[facility]    Medicine Progress Note - Hospitalist Service       Date of Admission:  11/19/2019  Assessment & Plan   Ms. Gem Jade is a pleasant 74-year-old female with complex past medical history of hypertension, dyslipidemia, diabetes mellitus type 2, history of renal transplant on prednisone and tacrolimus with CKD, chronic indwelling Johnson catheter with recurrent catheter-associated UTIs who presented initially to [facility] to have a PICC line placed.  Throughout the day of presentation, patient became more lethargic and somnolent, which is similar to her prior presentations for recurrent UTIs, and she is admitted for further evaluation.       Recurrent catheter-associated urinary tract infections.    UC - candida  She has a chronic Johnson indwelling catheter.  Last hospitalization was from 10/31 to 11/08/2019.  Her urine cultures were positive for Citrobacter and MSSA.  She was also bacteremic with MSSA.  She was treated with Zosyn in the hospital and switched to Ancef as per ID to continue for 3 more weeks for a total of 4 weeks of antibiotics.  She was discharged with Medline in place.  Apparently Medline did not work properly.  It was replaced.  Even a second Medline did not work properly and she was asked to come to ER to have a PICC line placed.  IV team tried not successful to place a PICC line earlier today and eventually she had a PICC line placed by IR.    - Grossly abnormal UA with , large LE, moderate blood  - No reported fever, initial lactic acid 0.8, initially did have altered mental status-similar to prior hospitalizations  - Obvious concern for incomplete treatment given prior issues with midline and home IV ABX dosing  - Culture showing 50-100k candida - micafungin per ID (opted to treat given immunocompromised state)  - Zosyn continued-ID following, appreciated - ID recs at least in hospital for the weekend  - Continues to be afebrile last few  "days.  -Await final antibiotic plan from ID  -updated  regarding current plans    Diarrhea likely due to antiotics  -Complains of loose stools and anal irritation secondary to this  -Discussed with RN to do skin cares, barrier cream  -No abdominal pain. She is high risk for C.diff. If persistent loose stools will check C. Diff.  -Had loose stools last hospital stay as well and was treated with prn imodium. Will trial imodium today.    History of obstructive sleep apnea   - order CPAP as per home settings.   - Did require soft wrist restraints initially due to agitation which she recalls today as she \"did not want to be here\".    -  last few nights has been compliant with home CPAP, no agitation or aggression with staff    Acute encephalopathy, metabolic - improved  likely related to her infection and worsening renal function and possible underlying severe sleep apnea contributing:  It seems that she had similar presentations in the past associated with her UTIs.  During her most recent hospitalization in the beginning of November, she had also problems with delirium.  She was seen by Psychiatry.  It seems that the delirium resolved.   - improved/normalized with treatment of UTI and IV fluids     Hypertension    Her blood pressure was on the higher side initially.    - PTA labetalol 300 bid and amlodipine 10 continued  - PTA prazosin continue    Acute kidney injury on chronic kidney disease, stage III to IV.   - baseline somewhere around 2.5,   - creatinine - 3.36 (peak was 3.8). Monitor.  - appreciate nephrology assistance and managing IVF. IVF stopped 11/23 and Nephrology has signed off. Recommend follow up with  Transplant [facility] next week with labs prior.  -Good UOP    Mild hyperkalemia- resolved     History of renal transplant    Her baseline creatinine had been between 1.5-1.8 earlier this year more recently 2.5.  More recently between 2.4-2.6.  She presented with a creatinine of 3.88 which is clearly " above her baseline.    - resumed her prior to admission prednisone 5 mg p.o. daily and tacrolimus 2 mg p.o. twice daily.       Dyslipidemia  - Hold prior to admission Pravachol; can be resumed later or at discharge     Hypothyroidism    - PTA levothyroxine po resumed     Diabetes mellitus type 2, insulin-dependent:  Diabetic neuropathy  At home she had maintained on Lantus 20 units subcutaneously at bedtime and insulin NovoLog 10 units with meals and sliding scale.    - A1d 5.8% 11/01/2019  - Increase to  Lantus 20 daily + ssi for now (was 18 unit(s) yesterday)  - POC sugars QID cml hs  - Resumed PTA gabapentin at lower dose given ROB (300 mg bedtime)    History of paroxysmal atrial fibrillation, not on anticoagulation prior to admission and continue her prior to admission aspirin.      History of gastroesophageal reflux disease   We will continue her prior to admission famotidine.     Diet: Moderate Consistent CHO Diet    DVT Prophylaxis: Pneumatic Compression Devices  Johnson Catheter: in place, indication: Other (Comment), (Chronic )  Code Status: Full Code      Disposition Plan   Expected discharge: 1- 2+ days pending rob improvement, uti treatment and ID abx plan  Entered: Katie Cooney MD 11/24/2019, 4:25 PM       The patient's care was discussed with the Bedside Nurse, Patient, Patient's Family..    Katie Cooney MD  Hospitalist Service  [facility]    ______________________________________________________________________    Interval History   Seen and examined. Continues to be alert and oriented. No new complaints. No f/c/sob . Has had increased loose stools today and complains of anal irritation and pain due to this.     Data reviewed today: I reviewed all medications, new labs and imaging results over the last 24 hours. I personally reviewed no images or EKG's today.    Physical Exam   Vital Signs: Temp: 98.1  F (36.7  C) Temp src: Oral BP: 118/54 Pulse: 66 Heart Rate: 65 Resp: 16 SpO2:  90 % O2 Device: None (Room air)    Weight: 246 lbs 11.12 oz    Gen: NAD, pleasant, obese  HEENT: Normocephalic, EOMI, MMM  Resp: no crackles,  no wheezes, no increased work of resp  CV: S1S2 heard, reg rhythm, reg rate  Abdo: soft, nontender, nondistended, bowel sounds present  Ext: calves nontender, well perfused  Neuro: AAOx3, CN grossly intact, no facial asymmetry  Skin: superficial skin breakdown in bilateral groin folds      Data   Recent Labs   Lab 11/24/19  0510 11/23/19  0545 11/22/19  0540 11/21/19  0610 11/20/19  0600 11/19/19  1415 11/19/19  1327 11/19/19  1235   WBC  --   --   --  7.0 7.9  --  6.3  --    HGB  --   --   --  8.8* 8.8*  --  8.9*  --    MCV  --   --   --  97 96  --  96  --    PLT  --   --   --  151 155  --  155  --    INR  --   --   --   --   --  1.12  --   --     137 137 141 139  --   --  138   POTASSIUM 4.0 3.9 4.3 5.4* 5.3  --   --  5.3   CHLORIDE 107 107 111* 117* 114*  --   --  113*   CO2 27 24 19* 16* 20  --   --  21   BUN 49* 50* 60* 60* 65*  --   --  58*   CR 3.36* 3.30* 3.46* 3.56* 3.73*  --   --  3.88*   ANIONGAP 4 6 7 8 5  --   --  4   ROGER 7.8* 7.9* 7.7* 7.9* 8.0*  --   --  8.0*   * 294* 309* 131* 109*  --   --  204*   ALBUMIN 1.9* 1.9*  --  2.0*  --   --   --  2.0*   PROTTOTAL  --  6.0*  --   --   --   --   --  6.5*   BILITOTAL  --  0.2  --   --   --   --   --  <0.1*   ALKPHOS  --  49  --   --   --   --   --  69   ALT  --  <6  --   --   --   --   --  <6   AST  --  6  --   --   --   --   --  12     No results found for this or any previous visit (from the past 24 hour(s)).

## 2019-11-24 NOTE — PLAN OF CARE
Vital signs stable on RA. A/Ox4. LS diminished. IVF d/cd. Incontinent of 1 loose brown BM. Mod cho diet, good intake. Chronic rico w/ adequate output. Urine yellow/cloudy.    R PICC saline locked. Intermittent abx as ordered. Bruised but nontender, infuses and draws well. IV team following.   Turn repo q2 with assist of 2. Coccyx YIMI, antifungal powder used throughout brandie area. skin fold noted to have breakdown, interdry in place.

## 2019-11-24 NOTE — PLAN OF CARE
VSS on RA. A/Ox4. Blood sugar 290. Assist of 2. Tolerating mod CHO diet. Denied N&V. +gas, no BM on shift. Johnson cath with adequate output, urine started to have slight pink color again. LS diminished. R PICC saline locked. Will continue to monitor.

## 2019-11-25 NOTE — PROGRESS NOTES
Sp[facility]    SH visited Pt per length of stay. Pt says she is doing okay, she's mostly trying to rest as much as possible right now because she has been feeling very tired. Pt has no additional SH needs at this time.     SH will remain available as needed.     Wendie Quintanilla  Chaplain Resident

## 2019-11-25 NOTE — PROGRESS NOTES
[facility]    Medicine Progress Note - Hospitalist Service       Date of Admission:  11/19/2019  Assessment & Plan   Ms. Gem Jade is a pleasant 74-year-old female with complex past medical history of hypertension, dyslipidemia, diabetes mellitus type 2, history of renal transplant on prednisone and tacrolimus with CKD, chronic indwelling Rico catheter with recurrent catheter-associated UTIs who presented initially to [facility] to have a PICC line placed.  Throughout the day of presentation, patient became more lethargic and somnolent, which is similar to her prior presentations for recurrent UTIs, and she is admitted for further evaluation.       Recurrent catheter-associated urinary tract infections.    UC - candida  Chronic rico secondary ?neurogenic bladder  She has a chronic Rico indwelling catheter.  Last hospitalization was from 10/31 to 11/08/2019.  Her urine cultures were positive for Citrobacter and MSSA.  She was also bacteremic with MSSA.  She was treated with Zosyn in the hospital and switched to Ancef as per ID to continue for 3 more weeks for a total of 4 weeks of antibiotics.  She was discharged with Medline in place.  Apparently Medline did not work properly.  It was replaced.  Even a second Medline did not work properly and she was asked to come to ER to have a PICC line placed.  IV team tried not successful to place a PICC line  and eventually she had a PICC line placed by IR.    - Grossly abnormal UA with , large LE, moderate blood  - No reported fever, initial lactic acid 0.8, initially did have altered mental status-similar to prior hospitalizations  - Obvious concern for incomplete treatment given prior issues with midline and home IV ABX dosing  - Culture showing 50-100k candida - micafungin per ID (opted to treat given immunocompromised state)  - ID followed patient here and she was placed on Zosyn. Switched to cefazolin as repeat blood  "cultures here were negative.   - Continues to be afebrile last few days.  -11/25 ID has switched zosyn to cefazolin and recommend this be stopped at discharge as she has nearly finished 28 days of iv abx.   -Completed short course of micafungin.  -updated  regarding current plans    Diarrhea likely due to antiotics  -Complains of loose stools and anal irritation secondary to this  -Discussed with RN to do skin cares, barrier cream  -Improved . If persistent will need to check C.diff.    History of obstructive sleep apnea   - order CPAP as per home settings.   - Did require soft wrist restraints initially due to agitation which she recalls today as she \"did not want to be here\".    -  last few nights has been compliant with home CPAP, no agitation or aggression with staff    Acute encephalopathy, metabolic - improved  likely related to her infection and worsening renal function and possible underlying severe sleep apnea contributing:  It seems that she had similar presentations in the past associated with her UTIs.  During her most recent hospitalization in the beginning of November, she had also problems with delirium.  She was seen by Psychiatry.  It seems that the delirium resolved.   - improved/normalized with treatment of UTI and IV fluids     Hypertension    Her blood pressure was on the higher side initially.    - PTA labetalol 300 bid and amlodipine 10 continued  - PTA prazosin continue    Acute kidney injury on chronic kidney disease, stage III to IV.   Fluid overload, mild pulmonary edema  - baseline somewhere around 2.5,   - creatinine - 3.37 (peak was 3.8). Monitor.  - appreciate nephrology assistance and managing IVF. IVF stopped 11/23 and Nephrology has signed off. Recommend follow up with  Transplant Center next week with labs prior.  -Good UOP  -Will give a dose of iv lasix 20 mg as she is edematous and also c/o sob, suspect pulmonary edema.    Mild hyperkalemia- resolved     History of renal " "transplant    Her baseline creatinine had been between 1.5-1.8 earlier this year more recently 2.5.  More recently between 2.4-2.6.  She presented with a creatinine of 3.88 which is clearly above her baseline.    - resumed her prior to admission prednisone 5 mg p.o. daily and tacrolimus 2 mg p.o. twice daily.  (she was briefly on a small stress dose of steroid)     Dyslipidemia  - Hold prior to admission Pravachol; can be resumed later or at discharge     Hypothyroidism    - PTA levothyroxine po resumed     Diabetes mellitus type 2, insulin-dependent:  Diabetic neuropathy, retinopathy  At home she had maintained on Lantus 20 units subcutaneously at bedtime and insulin NovoLog 10 units with meals and sliding scale.    - A1d 5.8% 11/01/2019  - Lantus 20 daily + ssi for now  - POC sugars QID cml hs  - Resumed PTA gabapentin at lower dose given ROB (300 mg bedtime)    History of paroxysmal atrial fibrillation, not on anticoagulation prior to admission and continue her prior to admission aspirin.      History of gastroesophageal reflux disease   We will continue her prior to admission famotidine.     Debility  Bedbound  Morbid obesity  Patient has been bedbound for the last 10 to 15 years.  She reports a progressive decline since her kidney transplant surgery around 1999 and further decline with her diabetic retinopathy as well as neuropathy and she gradually became bedbound.  She describes several barriers to accessing healthcare due to her bedbound status.  She tells me that the only time she gets to see doctors is when she is hospitalized.  She can only travel via ambulance.  She cannot make it to any doctor's appointments.  She does report \"complex health care team\" managing her health care.  She has an RN visit her couple of times a week and is able to have blood drawn at home.  Her primary care provider does make home calls and assist in communicating with specialists involved in her care.  I have discussed these " barriers with the care coordinator.  Anticipate she will be discharging home tomorrow.  Will order follow-up labs next week and these will need to be followed up by [facility] primary care provider[facility].    Diet: Moderate Consistent CHO Diet    DVT Prophylaxis: Pneumatic Compression Devices  Johnson Catheter: in place, indication: Other (Comment), (chronic)  Code Status: Full Code      Disposition Plan   Expected discharge: tomorrow pending creatinine stability.    Entered: Katie Cooney MD 11/25/2019, 2:56 PM       The patient's care was discussed with the Bedside Nurse, Patient, Patient's Family..    Katie Cooney MD  Hospitalist Service  [facility]    ______________________________________________________________________    Interval History   Seen and examined. Continues to be alert and oriented. No new complaints. No f/c. Reports sob tooay and just feeling fluid of fluid. UOP good yesterday.      Data reviewed today: I reviewed all medications, new labs and imaging results over the last 24 hours. I personally reviewed no images or EKG's today.    Physical Exam   Vital Signs: Temp: 98  F (36.7  C) Temp src: Oral BP: (!) 140/60 Pulse: 74 Heart Rate: 65 Resp: 16 SpO2: 93 % O2 Device: None (Room air)    Weight: 242 lbs 15.15 oz    Gen: NAD, pleasant, morbid obese  HEENT: Normocephalic, EOMI, MMM  Resp: faint bibasilar crackles,  no wheezes, no increased work of resp  CV: S1S2 heard, reg rhythm, reg rate  Abdo: soft, nontender, nondistended, bowel sounds present  Ext: calves nontender, well perfused  Neuro: AAOx3, CN grossly intact, no facial asymmetry  Skin: superficial skin breakdown in bilateral groin folds      Data   Recent Labs   Lab 11/25/19  0535 11/24/19  0510 11/23/19  0545  11/21/19  0610 11/20/19  0600 11/19/19  1415 11/19/19  1327 11/19/19  1235   WBC  --   --   --   --  7.0 7.9  --  6.3  --    HGB  --   --   --   --  8.8* 8.8*  --  8.9*  --    MCV   --   --   --   --  97 96  --  96  --    PLT  --   --   --   --  151 155  --  155  --    INR  --   --   --   --   --   --  1.12  --   --     138 137   < > 141 139  --   --  138   POTASSIUM 4.6 4.0 3.9   < > 5.4* 5.3  --   --  5.3   CHLORIDE 107 107 107   < > 117* 114*  --   --  113*   CO2 27 27 24   < > 16* 20  --   --  21   BUN 47* 49* 50*   < > 60* 65*  --   --  58*   CR 3.37* 3.36* 3.30*   < > 3.56* 3.73*  --   --  3.88*   ANIONGAP 6 4 6   < > 8 5  --   --  4   ROGER 7.7* 7.8* 7.9*   < > 7.9* 8.0*  --   --  8.0*   * 193* 294*   < > 131* 109*  --   --  204*   ALBUMIN 2.0* 1.9* 1.9*  --  2.0*  --   --   --  2.0*   PROTTOTAL  --   --  6.0*  --   --   --   --   --  6.5*   BILITOTAL  --   --  0.2  --   --   --   --   --  <0.1*   ALKPHOS  --   --  49  --   --   --   --   --  69   ALT  --   --  <6  --   --   --   --   --  <6   AST  --   --  6  --   --   --   --   --  12    < > = values in this interval not displayed.     No results found for this or any previous visit (from the past 24 hour(s)).     Total time spent in patient encounter : 35 mins  Greater than 50 % time spent in counseling and coordination of care (with SW , pt, family)

## 2019-11-25 NOTE — CONSULTS
Care Transition Initial Assessment - RN        Met with: Patient.  DATA   Active Problems:    UTI (urinary tract infection)       Cognitive Status: awake, alert and oriented.        Contact information and PCP information verified: Yes     Lives With: at home with her spouse, Chang and receives, Home RN and aides. Aides are there 3 times a week for 2 hours at a time. Home Care through [employer]         Insurance concerns: No Insurance issues identified  ASSESSMENT  Patient currently receives the following services:  Home RN and aide visits through [facility]       Identified issues/concerns regarding health management: Patient relieved that IV antibiotics will be complete upon discharge    PLAN  Financial costs for the patient include per insurance coverage .  Patient given options and choices for discharge yes, wants her services resumed with [facility] services .  Patient/family is agreeable to the plan?  Yes:   Patient anticipates discharging to home with spouse and family help along with her established Home Care services.     Patient states she has been in bed last 8 years. I called and talked to clinic care coordinator, Porsha. Porsha confirms services in the home and she also has access to community paramedic to make in home assessments when needed. Home RN and aides are there frequently. Porsha confirms has new christiana and has recently started to get out of bed to wheelchair. It is limited by dizziness and per patient feeling sick to stomach. Patient also confirms recently getting OOB to wheelchair with the christiana and also states the difficulty with vertigo and sick to stomach.       Patient anticipates needs for home equipment: No, has 3 wheelchairs, christiana and bed  Transportation/person available to transport on day of discharge  is [facility] transport and have they been notified/set up no  Plan/Disposition: Home   Appointments: follow up with PCP post hospital  Per routine per clinic staff.      Care   (CTS) will continue to follow as needed. Anticipate follow up by team member on Tuesday, November 26.    Aditi Martinez RN   [facility]   Phone [phone]

## 2019-11-25 NOTE — PROGRESS NOTES
[facility]    Infectious Disease Progress Note    Date of Service (when I saw the patient): 11/25/2019     Assessment & Plan   Gem Jade is a 74 year old female who was admitted on 11/19/2019.       Impression:  1. 74 y.o female very well known to me from prior multiple admissions.   2. Currently on iV ancef for a recent MSSA bacteremia.   3. Was in the hospital due to midline/ picc related issues when noted to be more somnolent than usual.   5. UA grossly abnormal , UC with candida and BC negative.      Recommendations:   Done with a short course of micafingin.   Almost done with 4 weeks of IV antibiotics for 1/2 positive blood cultures for MSSA, repeat blood cultures have been negative, has had multiple issues with lines, drugs, transportation from home we can stop antibiotics when ready for discharge.      Paramjit Mckenzie MD    Interval History   Awake today   remembers every this no longer confused      Physical Exam   Temp: 98  F (36.7  C) Temp src: Oral BP: (!) 140/60 Pulse: 74 Heart Rate: 65 Resp: 16 SpO2: 93 % O2 Device: None (Room air)    Vitals:    11/23/19 0438 11/24/19 0500 11/25/19 0633   Weight: 111.1 kg (244 lb 14.9 oz) 111.9 kg (246 lb 11.1 oz) 110.2 kg (242 lb 15.2 oz)     Vital Signs with Ranges  Temp:  [98  F (36.7  C)-98.1  F (36.7  C)] 98  F (36.7  C)  Pulse:  [66-76] 74  Heart Rate:  [65-67] 65  Resp:  [16] 16  BP: (118-140)/(33-60) 140/60  SpO2:  [88 %-93 %] 93 %    Constitutional: Awake, alert, cooperative, no apparent distress  Lungs: Clear to auscultation bilaterally, no crackles or wheezing  Cardiovascular: Regular rate and rhythm, normal S1 and S2, and no murmur noted  Abdomen: Normal bowel sounds, soft, non-distended, non-tender  Skin: No rashes, no cyanosis, no edema  Other:    Medications     - MEDICATION INSTRUCTIONS -         amLODIPine  10 mg Oral Daily     aspirin  81 mg Oral Daily     calcium carbonate-vitamin D  1 tablet Oral TID w/meals     famotidine  20 mg  Oral Daily     gabapentin  300 mg Oral At Bedtime     insulin aspart  1-4 Units Subcutaneous 4x Daily w/meals     insulin glargine  20 Units Subcutaneous At Bedtime     labetalol  300 mg Oral BID     lactobacillus rhamnosus (GG)  1 capsule Oral TID     levothyroxine  50 mcg Oral Daily     pravastatin  10 mg Oral Daily     predniSONE  5 mg Oral Daily     sodium bicarbonate  1,300 mg Oral BID     sodium chloride (PF)  3 mL Intracatheter Q8H     tacrolimus  2 mg Oral BID       Data   All microbiology laboratory data reviewed.  Recent Labs   Lab Test 11/21/19  0610 11/20/19  0600 11/19/19  1327   WBC 7.0 7.9 6.3   HGB 8.8* 8.8* 8.9*   HCT 29.6* 29.6* 29.5*   MCV 97 96 96    155 155     Recent Labs   Lab Test 11/25/19  0535 11/24/19  0510 11/23/19  0545   CR 3.37* 3.36* 3.30*     Recent Labs   Lab Test 05/25/16  2116   SED 53*     Recent Labs   Lab Test 11/19/19  1327 11/19/19  1245 11/19/19  1235 11/03/19 2008 11/02/19  1521 11/01/19  1549 11/01/19  1540 10/31/19  1404 10/31/19  1310   CULT No growth after 5 days 50,000 to 100,000 colonies/mL  Candida albicans / dubliniensis  Candida albicans and Candida dubliniensis are not routinely speciated  Susceptibility testing not routinely done  * No growth after 5 days No growth No growth No growth No growth No growth  Cultured on the 2nd day of incubation:  Staphylococcus aureus  *  Critical Value/Significant Value, preliminary result only, called to and read back by   Johana Robison RN 11/1/2019 @[facility]      (Note)  POSITIVE for STAPHYLOCOCCUS AUREUS and NEGATIVE for the mecA gene  (not MRSA) by [employer] multiplex nucleic acid test. The mecA gene was  not detected. Final identification and antimicrobial susceptibility  testing will be verified by standard methods.    Specimen tested with [employer] multiplex, gram-positive blood culture  nucleic acid test for the following targets: Staph aureus, Staph  epidermidis, Staph lugdunensis, other Staph species,  Enterococcus  faecalis, Enterococcus faecium, Streptococcus species, S. agalactiae,  S. anginosus grp., S. pneumoniae, S. pyogenes, Listeria sp., mecA  (methicillin resistance) and Viridiana/B (vancomycin resistance).    Critical Value/Significant Value called to and read back by GENE WONG AT 1350 11/1/19 [facility]   >100,000 colonies/mL  Citrobacter freundii complex  *  >100,000 colonies/mL  Staphylococcus aureus  *  >100,000 colonies/mL  urogenital santos  Susceptibility testing not routinely done         Attestation:  Total time on the floor involved in the patient's care: 35 minutes. Total time spent in counseling/care coordination: >50%

## 2019-11-25 NOTE — PLAN OF CARE
VSS on RA. A/Ox4. Tele: SR. Assist of 2. Tolerating mod CHO diet. Denied N&V. +gas, no BM on shift. Johsnon cath with adequate output. LS diminished. R PICC saline locked. Will continue to monitor.

## 2019-11-25 NOTE — PLAN OF CARE
Vital signs stable on RA. A/Ox4. LS diminished. Mod cho diet, good intake. Chronic rico w/ adequate output. Urine yellow/cloudy. Had large quantity of loose brown BM this AM, no further BMs the rest of shift. Utilizing brandt and dry wipes to help minimize irritation/breakdown.     R PICC saline locked. Intermittent abx as ordered. Bruised but nontender, infuses and draws well.   Turn repo q2 with assist of 2. Coccyx YIMI, antifungal powder used throughout brandie area. skin folds noted to have breakdown, interdry in place.

## 2019-11-25 NOTE — TELEPHONE ENCOUNTER
SN  Orders faxed to [facility] at [phone].  Cecilia Hernandez MA
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
## 2019-11-26 NOTE — PROGRESS NOTES
"  [facility] Nurse Inpatient Wound Assessment   Initial Assessment of wound(s) on pt's:   Low, midline coccyx    WOC NURSE ASSESSMENT    Previous pantera cleft split and fungal rash has resolved. Pt has very slow to bernadine erythema to Right buttock buttock appears more likely caused by friction then pressure.   Recommend PIP and hygiene measures, see plan below  WO NURSE TREATMENT PLAN    Plan of care for wound located Right buttock: every 3 days and PRN  1. Clean wound with saline or MicroKlenz Spray, pat dry  2. Paint periwound tissue with No Sting Skin Prep (#663910) and allow to dry thoroughly  3. Press a Mepilex  Border Dressing (#787576) to the area, making sure to conform nicely to skin curvatures   4. Time and date dressing change and hermelinda with a \"T\" for treatment of a wound  5. Reposition pt every 1 to 2 hours when in bed and hourly when up to the chair to relieve pressure and promote perfusion to tissue  NOTE- continue to assess under dressing and document findings BID, per protocol    Pannus: BID  1. Cleanse with brandt cleanse with jb soft white washclot and protect and Ensure area is completely dry  2x daily  2. Be gentle when cleansing skin this is extremely tender for patient   3. Cut long tail of Interdry AG (#277015) and lay flat into base of fold. Make sure at least 3 inch tail hangs outside of the body to wick moisture. Change this every 3 days and PRN  **avoid any creams or powders wile using interdry as this clogs the pores and does not allow it to work effectively      Pressure Injury Prevention (PIP) Plan:  If patient is declining pressure injury prevention interventions: Explore reason why and address patient's concerns and Educate on pressure injury risk and prevention intervention(s)  Mattress: Follow bed algorithm, reassess daily and order specialty mattress, if indicated.  HOB: Maintain at or below 30 degrees, unless contraindicated  Repositioning in bed: Every 1-2 hours , Left/right " positioning; avoid supine and Raise foot of bed prior to raising head of bed, to reduce patient sliding down (shear)  Heels: Keep elevated off mattress and Pillows under calves  Protective Dressing: mepilex  Positioning Equipment: None  Chair positioning: pt bedbound   If patient has a buttock pressure injury, or high risk for PI use chair cushion or SPS.  Moisture Management: Perineal cleansing /protection: Follow Incontinence Protocol, Avoid brief in bed, Clean and dry skin folds with bathing  and Consider InterDry (#751764) between folds  Under Devices: Inspect skin under all medical devices during skin inspection , Ensure tubes are stabilized without tension and Ensure patient is not lying on medical devices or equipment when repositioned  Ask provider to discontinue device when no longer needed.        Nursing to notify Provider(s) and re-consult the WOC Nurse if wound(s) deteriorate or new skin concerns    WO Nurse follow-up plan: weekly  OBJECTIVE DATA    Patient history according to provider notes:  74-year-old female with complex past medical history of hypertension, dyslipidemia, diabetes mellitus type 2, history of renal transplant on prednisone and tacrolimus with CKD, chronic indwelling Johnson catheter with recurrent catheter-associated UTIs who presented initially to [facility] to have a PICC line placed.  Throughout the day, patient became more lethargic and somnolent, which is similar to her prior presentations for recurrent UTIs, and she is admitted for further evaluation.     Albino Risk Assessment  Sensory Perception: 2-->very limited(antonio)    Moisture: 3-->occasionally moist   Activity: 1-->bedfast     Mobility: 1-->completely immobile   Nutrition: 2-->probably inadequate   Friction and Shear: 1-->problem      Positioning: Pillows,     Mattress:  Standard , Atmos Air mattress    Current diet  Orders Placed This Encounter      Moderate Consistent CHO Diet        Moisture  Management:  Urinary Catheter  o Catheter secured? Yes     I/O last 3 completed shifts:  In: 467 [P.O.:240; I.V.:227]  Out: 1375 [Urine:1375]    Labs:   Recent Labs   Lab Test 11/21/19  0610  11/19/19  1415  11/01/19  0920   ALBUMIN 2.0*  --   --    < >  --    HGB 8.8*   < >  --    < > 10.4*   INR  --   --  1.12  --   --    WBC 7.0   < >  --    < > 6.9   A1C  --   --   --   --  5.8*   CRP  --   --   --   --  <2.9    < > = values in this interval not displayed.         [facility] NURSE PHYSICAL EXAM    Wound Assessment (location):   Low, midline coccyx  Wound History:  Pt requiring full cares  3.0cm x 2.0cm x 0cm irregular shaped red to purple slow to bernadine erythema. Skin over entire sacral area slightly dry and peeling.   Date of last photo 11/26/2019        [facility] NURSE INTERVENTIONS    Assessed buttocks/ coccyx   Wound Care: per POC  Supplies: reviewed, floor stock and discussed with patient  Discussed plan of care with Patient and Nurse  Education provided: Moisture management, Hygiene and Off-loading pressure    Rich Owusu RN BSN CWOCN

## 2019-11-26 NOTE — PHARMACY-CONSULT NOTE
Pharmacist consulted    Re: tacrolimus in MAR states to check if blood level is needed before admin of dose: per pharmacy OK to give    Re: calcium carbonate vit D and levothyroxine scheduled at same time however  -Per pharmacist, skip this dose of Calcium carb as pt not eating right now anyway and pharmacy will adjust admin schedule for tomorrow for levothyroxine to be given earlier.

## 2019-11-26 NOTE — PLAN OF CARE
A/Ox4. VSS on ra ex htn - taking blood pressure on left forearm. LS: diminished throughout. BS: normal, flatus +. Wound on coccyx - pink and YIMI. Right great toe amputation site, YIMI. Skin integrity unchanged. Voiding adequately to chronic rico, clear and yellow urine. PICC intact. Repositioning q2h, patient bed bound. Denies pain. Tolerating a mod cho diet. Discharge pending on creatinine levels. Will continue to monitor.

## 2019-11-26 NOTE — PLAN OF CARE
3P-730P: VSS. Denies pain. Pt repositioned x1. Urine output WDL. Skin integrity unchanged. LLOYD continued. Good appetite/oral intake. Imodium given x1 for one episode of loose stool.  Blood sugar elevated, 2 units given. CTM.

## 2019-11-26 NOTE — DISCHARGE SUMMARY
Discharge Summary  Hospitalist    Date of Admission:  11/19/2019  Date of Discharge:  11/26/2019  Discharging Provider: Katie Cooney MD  Date of Service (when I saw the patient): 11/26/19    Discharge Diagnoses   Recurrent catheter-associated urinary tract infections.    UC - candida  Acute encephalopathy  Acute kidney injury on chronic kidney disease, stage III to IV  History of renal transplant   Diabetes mellitus type 2, insulin-dependent:  Chronic rico secondary ?neurogenic bladder  Chronic Debility  Bedbound  Morbid obesity    History of Present Illness   Ms. Gem Jade is a pleasant 74-year-old female with complex past medical history of hypertension, dyslipidemia, diabetes mellitus type 2, history of renal transplant on prednisone and tacrolimus with CKD, chronic indwelling Rico catheter with recurrent catheter-associated UTIs who presented initially to [facility] to have a PICC line placed.  Throughout the day of presentation, patient became more lethargic and somnolent, which is similar to her prior presentations for recurrent UTIs, and she is admitted for further evaluation.     Hospital Course   The following problems were addressed here:    Recurrent catheter-associated urinary tract infections.    UC - candida  Chronic rico secondary ?neurogenic bladder  She has a chronic Rico indwelling catheter.  Last hospitalization was from 10/31 to 11/08/2019.  Her urine cultures were positive for Citrobacter and MSSA.  She was also bacteremic with MSSA.  She was treated with Zosyn in the hospital and switched to Ancef as per ID to continue for 3 more weeks for a total of 4 weeks of antibiotics.  She was discharged with Medline in place.  Apparently Medline did not work properly.  It was replaced.  Even a second Medline did not work properly and she was asked to come to ER to have a PICC line placed.  IV team tried not successful to place a PICC line  and eventually she had a PICC line  "placed by IR.    - Grossly abnormal UA with , large LE, moderate blood  - No reported fever, initial lactic acid 0.8, initially did have altered mental status-similar to prior hospitalizations  - Obvious concern for incomplete treatment given prior issues with midline and home IV ABX dosing  - Culture showing 50-100k candida - micafungin per ID (opted to treat given immunocompromised state)  - ID followed patient here and she was placed on Zosyn. Switched to cefazolin as repeat blood cultures here were negative.   - Continued to be afebrile last few days.  -11/25 ID  switched zosyn to cefazolin and recommend this be stopped at discharge as she has nearly finished 28 days of iv abx. All antibiotics stopped at discharge.  -Completed short course of micafungin.     Diarrhea likely due to antiotics  - loose stools and anal irritation secondary to this  - skin cares, barrier cream  -Improved with prn imodium     History of obstructive sleep apnea   - order CPAP as per home settings.   - Did require soft wrist restraints initially due to agitation which she recalls today as she \"did not want to be here\".    -  last few nights has been compliant with home CPAP, no agitation or aggression with staff     Acute encephalopathy, metabolic - improved  likely related to her infection and worsening renal function and possible underlying severe sleep apnea contributing:  It seems that she had similar presentations in the past associated with her UTIs.  During her most recent hospitalization in the beginning of November, she had also problems with delirium.  She was seen by Psychiatry.  It seems that the delirium resolved.   - improved/normalized with treatment of UTI and IV fluids     Hypertension    Her blood pressure was on the higher side initially.    - PTA labetalol 300 bid and amlodipine 10 continued  - PTA prazosin continue     Acute kidney injury on chronic kidney disease, stage III to IV.   Fluid overload, mild " pulmonary edema  - baseline somewhere around 2.5,   - creatinine - 3.3 (peak was 3.8). Monitor.  - appreciate nephrology assistance and managing IVF. IVF stopped 11/23 and Nephrology has signed off.  -bicarbonate supplement increased and also started on Ca-vitD as phosphate binder   Recommend follow up with  Transplant Center next week with labs prior.  -Good UOP  -11/25dose of iv lasix 20 mg as she is edematous and also c/o sob, suspect pulmonary edema. Resolved     Mild hyperkalemia- resolved     History of renal transplant    Her baseline creatinine had been between 1.5-1.8 earlier this year more recently 2.5.  More recently between 2.4-2.6.  She presented with a creatinine of 3.88 which is clearly above her baseline.    - resumed her prior to admission prednisone 5 mg p.o. daily and tacrolimus 2 mg p.o. twice daily.  (she was briefly on a small stress dose of steroid)     Dyslipidemia  -  Pravachol resumed  at discharge     Hypothyroidism    - PTA levothyroxine po resumed     Diabetes mellitus type 2, insulin-dependent:  Diabetic neuropathy, retinopathy  At home she had maintained on Lantus 20 units subcutaneously at bedtime and insulin NovoLog 10 units with meals and sliding scale.    - A1d 5.8% 11/01/2019  - Lantus 20 daily + ssi for now  - POC sugars QID cml hs  - Resumed PTA gabapentin at lower dose given ROB (300 mg bedtime)     History of paroxysmal atrial fibrillation, not on anticoagulation prior to admission and continue her prior to admission aspirin.      History of gastroesophageal reflux disease   We will continue her prior to admission famotidine.     Stage 2 pressure ulcer on coccyx  Groin intertrigo   -Skin cares per wound RN and nursing    Debility  Bedbound  Morbid obesity  Patient has been bedbound for the last 10 to 15 years.  She reports a progressive decline since her kidney transplant surgery around 1999 and further decline with her diabetic retinopathy as well as neuropathy and she  "gradually became bedbound.  She describes several barriers to accessing healthcare due to her bedbound status.  She tells me that the only time she gets to see doctors is when she is hospitalized.  She can only travel via ambulance.  She cannot make it to any doctor's appointments.  She does report \"complex health care team\" managing her health care.  She has an RN visit her couple of times a week and is able to have blood drawn at home.  Her primary care provider does make home calls and assist in communicating with specialists involved in her care.  I have discussed these barriers with the care coordinator. Will set up stretcher transport back home due to bedbound status.  Will order follow-up labs next week and these will need to be followed up by her primary care provider/the transplant team at the [facility] as possible.     Diet: Moderate Consistent CHO Diet    DVT Prophylaxis: Pneumatic Compression Devices  Johnson Catheter: in place, indication: , (chronic)  Code Status: Full Code      Katie Cooney MD, MD        Pending Results     Unresulted Labs Ordered in the Past 30 Days of this Admission     No orders found from 10/20/2019 to 11/20/2019.          Code Status   Full Code       Primary Care Physician   Marivel Barcenas NP    Physical Exam   Temp: 98  F (36.7  C) Temp src: Oral BP: 113/56 Pulse: 94 Heart Rate: 84 Resp: 18 SpO2: 94 % O2 Device: None (Room air)    Vitals:    11/24/19 0500 11/25/19 0633 11/26/19 0300   Weight: 111.9 kg (246 lb 11.1 oz) 110.2 kg (242 lb 15.2 oz) 108.6 kg (239 lb 8 oz)     Vital Signs with Ranges  Temp:  [97.8  F (36.6  C)-98.3  F (36.8  C)] 98  F (36.7  C)  Pulse:  [67-94] 94  Heart Rate:  [78-96] 84  Resp:  [16-18] 18  BP: (113-155)/(33-77) 113/56  SpO2:  [88 %-94 %] 94 %  I/O last 3 completed shifts:  In: 467 [P.O.:240; I.V.:227]  Out: 1375 [Urine:1375]    Gen: NAD, pleasant, morbid obese  HEENT: Normocephalic, EOMI, MMM  Resp: non labored breathing,  no wheezes, " no increased work of resp  CV: S1S2 heard, reg rhythm, reg rate  Abdo: soft, nontender, nondistended, bowel sounds present  Ext: calves nontender, well perfused  Neuro: AAOx3, CN grossly intact, no facial asymmetry  Skin: superficial skin breakdown in bilateral groin folds, also has stage 2 pressure ulcer on coccyx         Discharge Disposition   Discharged to home  Condition at discharge: Stable    Consultations This Hospital Stay   INFECTIOUS DISEASES IP CONSULT  NEPHROLOGY IP CONSULT  PHARMACY IP CONSULT  WOUND OSTOMY CONTINENCE NURSE  IP CONSULT  OCCUPATIONAL THERAPY ADULT IP CONSULT  CARE COORDINATOR IP CONSULT    Time Spent on this Encounter   Katie LU MD, personally saw the patient today and spent greater than 30 minutes discharging this patient.    Discharge Orders      Home care nursing referral      Reason for your hospital stay    You were hospitalized with urinary tract infection and altered mental status and problems with the IV line , a PICC line was placed and you finished course of your iv antibiotics.     Follow-up and recommended labs and tests     Follow up with primary care provider, Marivel Barcenas NP, within 7 days to evaluate medication change and for hospital follow- up.  The following labs/tests are recommended: creatinine , electrolytes.    Follow up with Renal Transplant team at [facility] in 1 - 2 weeks with prior labs     Activity    Your activity upon discharge: activity as tolerated     Monitor and record    Monitor blood sugars at home at least twice daily and review on provider visit, optimize insulin regimen.  Monitor blood pressures at home and review on provider visit.     When to contact your care team    Call doctor if you have any of the following:  increased shortness of breath, increased swelling or lethargy, confusion.     Discharge Instructions    Johnson catheter care.  Turn in position and out of bed as able to to prevent pressure  injury.  Avoid narcotics, minimize use of muscle relaxants to as needed.  Hold if drowsy or any change in mental status.     Tubes and drains    You are going home with the following tubes or drains: rico catheter.     MD face to face encounter    Documentation of Face to Face and Certification for Home Health Services    I certify that patient: Gem Jade is under my care and that I, or a nurse practitioner or physician's assistant working with me, had a face-to-face encounter that meets the physician face-to-face encounter requirements with this patient on: 11/26/2019.    This encounter with the patient was in whole, or in part, for the following medical condition, which is the primary reason for home health care: Recurrent UTIs, bedbound , chronic debility.    I certify that, based on my findings, the following services are medically necessary home health services: Nursing.    My clinical findings support the need for the above services because: Nurse is needed: To provide assessment and oversight required in the home to assure adherence to the medical plan due to: complex medical issues..    Further, I certify that my clinical findings support that this patient is homebound (i.e. absences from home require considerable and taxing effort and are for medical reasons or [employer] services or infrequently or of short duration when for other reasons) because: Patient is bedbound due to: chronic debility, diabetic neuropathy, obesity, deconditioned..    Based on the above findings. I certify that this patient is confined to the home and needs intermittent skilled nursing care, physical therapy and/or speech therapy.  The patient is under my care, and I have initiated the establishment of the plan of care.  This patient will be followed by a physician who will periodically review the plan of care.  Physician/Provider to provide follow up care: Marivel Barcenas    Attending hospital physician (the  Medicare certified [facility] provider): Katie Cooney MD  Physician Signature: See electronic signature associated with these discharge orders.  Date: 11/26/2019     Full Code     Diet    Follow this diet upon discharge: Orders Placed This Encounter      Moderate Consistent CHO Diet     Discharge Medications   Current Discharge Medication List      START taking these medications    Details   calcium carbonate-vitamin D (OSCAL W/D) 500-200 MG-UNIT tablet Take 1 tablet by mouth 3 times daily  Qty: 90 tablet, Refills: 0    Associated Diagnoses: Kidney replaced by transplant         CONTINUE these medications which have CHANGED    Details   gabapentin (NEURONTIN) 100 MG capsule Take 3 capsules (300 mg) by mouth At Bedtime May take one extra tab at bedtime as needed  Qty: 30 capsule, Refills: 0    Associated Diagnoses: Diabetic polyneuropathy associated with type 2 diabetes mellitus (H)      sodium bicarbonate 650 MG tablet Take 2 tablets (1,300 mg) by mouth 2 times daily  Qty: 30 tablet, Refills: 0    Associated Diagnoses: CKD (chronic kidney disease) stage 3, GFR 30-59 ml/min (H)         CONTINUE these medications which have NOT CHANGED    Details   acetaminophen (TYLENOL) 325 MG tablet Take 325-650 mg by mouth every 4 hours as needed       amLODIPine (NORVASC) 5 MG tablet TAKE TWO TABLETS (10MG) BY MOUTH DAILY  Qty: 60 tablet, Refills: 2    Associated Diagnoses: Benign essential hypertension      aspirin 81 MG tablet Take 81 mg by mouth daily      baclofen (LIORESAL) 10 MG tablet 10mg 2x a month with cath changes  Qty: 60 tablet, Refills: 3    Associated Diagnoses: Urinary catheter (Johnson) change required      blood glucose (NO BRAND SPECIFIED) lancets standard Use to test blood sugar 3 times daily or as directed. (Accuchek guide)  Qty: 300 each, Refills: 3    Associated Diagnoses: Type 2 diabetes mellitus with hyperglycemia, with long-term current use of insulin (H)      blood glucose (NO BRAND SPECIFIED) test strip  Use to test blood sugar 3 times daily or as directed. (accuchek guide)  Qty: 300 strip, Refills: 3    Associated Diagnoses: Type 2 diabetes mellitus with hyperglycemia, with long-term current use of insulin (H)      COMFORT EZ PEN NEEDLES 31G X 6 MM miscellaneous USE 4 DAILY OR AS DIRECTED  Qty: 100 each, Refills: 3    Associated Diagnoses: Type 2 diabetes mellitus (H)      CRANBERRY PO Take 1 tablet by mouth 2 times daily       cyanocobalamin (CYANOCOBALAMIN) 1000 MCG/ML injection Inject 1 mL (1,000 mcg) into the muscle every 2 months  Qty: 3 mL, Refills: 3    Comments: Hold for refills, dose update  Associated Diagnoses: Iron deficiency anemia, unspecified iron deficiency anemia type      diphenoxylate-atropine (LOMOTIL) 2.5-0.025 MG tablet Take 1-2 tablets by mouth 4 times daily as needed for diarrhea  Qty: 360 tablet, Refills: 0    Associated Diagnoses: Diarrhea, unspecified type      famotidine (PEPCID) 20 MG tablet Take 1 tablet (20 mg) by mouth 2 times daily  Qty: 60 tablet, Refills: 1      gentamicin irrigation Irrigate rico cath 3x a week  60ml of 400mg of gentamicinin [id number] of 0.9% Sodium Chloride  On hold until done with current antibiotics  Qty: 1 Bottle, Refills: 1    Associated Diagnoses: Recurrent UTI      insulin aspart (NOVOLOG FLEXPEN) 100 UNIT/ML pen Take 10 units with meals + sliding scale if eating. Sliding scale only at bedtime when not eating. Max 25 units per day. Hold if pre-meal glucose is <90  Sliding scale:   140-199 2u  200-249 4u  250-299 6u  300-349 8u  350-399 10u  Over 400 12u  Qty: 45 mL, Refills: 3      insulin glargine (LANTUS SOLOSTAR) 100 UNIT/ML pen Inject 20 Units Subcutaneous At Bedtime  Qty: 30 mL, Refills: 3    Comments: Please dispense 90-day supply if able at next fill.  Associated Diagnoses: Type 2 diabetes mellitus with diabetic polyneuropathy, with long-term current use of insulin (H)      labetalol (NORMODYNE) 300 MG tablet Take 1 tablet (300 mg) by mouth 2 times  daily  Qty: 30 tablet, Refills: 0    Comments: Future refills by PCP Dr. Marivel Barcenas, [place] with phone number [phone].  Associated Diagnoses: Benign essential hypertension      lactobacillus rhamnosus, GG, (CULTURELL) capsule Take 1 capsule by mouth 3 times daily  Qty: 60 capsule, Refills: 11    Associated Diagnoses: Hospital discharge follow-up; Recurrent UTI      levothyroxine (SYNTHROID/LEVOTHROID) 50 MCG tablet TAKE ONE TABLET BY MOUTH EVERY DAY  Qty: 90 tablet, Refills: 0    Associated Diagnoses: Other specified hypothyroidism      lidocaine (XYLOCAINE) 2 % external gel Apply topically 3 times daily  Qty: 85 mL, Refills: 11    Associated Diagnoses: Johnson catheter in place      loratadine (CLARITIN) 10 MG tablet Take 1 tablet (10 mg) by mouth daily  Qty: 90 tablet, Refills: 3    Associated Diagnoses: Acute nasopharyngitis      menthol-zinc oxide (CALMOSEPTINE) 0.44-20.625 % OINT ointment Apply topically 4 times daily as needed for skin protection    Associated Diagnoses: Skin abrasion      ondansetron (ZOFRAN ODT) 4 MG ODT tab Take 1 tablet (4 mg) by mouth daily as needed for nausea 30 minutes before getting up in your wheelchair  Qty: 20 tablet, Refills: 1    Associated Diagnoses: Vertigo      !! order for DME Equipment being ordered: Traxx Mobility System, Titan 500, free standing overhead patient lift    Please send to  DME  Qty: 1 Device, Refills: 0    Associated Diagnoses: Bedbound; Class 2 obesity without serious comorbidity with body mass index (BMI) of 35.0 to 35.9 in adult, unspecified obesity type      !! order for DME Equipment being ordered: CPAP mask, ResMed Ahuja FX, nasal pillows system, women's,   Catalog # [id number]    Please send to  DME  Qty: 1 Device, Refills: 3    Associated Diagnoses: Obstructive sleep apnea syndrome      !! order for DME Equipment being ordered: Tegaderm 4x4   MICHELLE 6 months  Sacral wound  Qty: 1 Box, Refills: 3    Associated Diagnoses: Decubitus ulcer of  "sacral region, unspecified ulcer stage      !! order for DME Equipment being ordered: [employer] Iman gel-T cushion 20 inches wide & 18 inches deep    height 1.676 m (5' 6\"), weight 101.6 kg (223 lb 15.8 oz)  Qty: 1 each, Refills: 0    Associated Diagnoses: Back pain      pravastatin (PRAVACHOL) 10 MG tablet Take 1 tablet (10 mg) by mouth daily  Qty: 90 tablet, Refills: 3    Associated Diagnoses: Hyperlipidemia LDL goal <100      prazosin (MINIPRESS) 1 MG capsule Take 2 capsules (2 mg) by mouth At Bedtime  Qty: 60 capsule, Refills: 1    Associated Diagnoses: Nightmares      predniSONE (DELTASONE) 5 MG tablet Take 1 tablet (5 mg) by mouth daily  Qty: 90 tablet, Refills: 3    Associated Diagnoses: Kidney replaced by transplant      simethicone (MYLICON) 125 MG CHEW Take 1 tablet (125 mg) by mouth as needed for intestinal gas  Qty: 120 tablet      tacrolimus (GENERIC EQUIVALENT) 1 MG capsule Take 2 capsules (2 mg) by mouth 2 times daily  Qty: 120 capsule, Refills: 11    Associated Diagnoses: Kidney replaced by transplant      vitamin D3 (CHOLECALCIFEROL) 2000 units (50 mcg) tablet Take 2,000 Units by mouth 2 times daily        !! - Potential duplicate medications found. Please discuss with provider.      STOP taking these medications       ceFAZolin (ANCEF) 1 GM vial Comments:   Reason for Stopping:             Allergies   Allergies   Allergen Reactions     Atorvastatin Calcium      myalgias, muscle aches     Codeine Nausea and Vomiting     Darvocet [Propoxyphene N-Apap] Nausea and Vomiting     Hydromorphone      Levofloxacin Other (See Comments) and Diarrhea     hallucinations     Morphine      Nausea and vomiting     Niaspan [Niacin] GI Disturbance     Flushing even at low dose, GI upset     Percocet [Oxycodone-Acetaminophen]      Vomiting after taking med couple of times     Vicodin [Hydrocodone-Acetaminophen] Nausea and Vomiting     Data   Most Recent 3 CBC's:  Recent Labs   Lab Test 11/21/19  0610 11/20/19  0600 " 11/19/19  1327   WBC 7.0 7.9 6.3   HGB 8.8* 8.8* 8.9*   MCV 97 96 96    155 155      Most Recent 3 BMP's:  Recent Labs   Lab Test 11/26/19  0526 11/25/19  0535 11/24/19  0510    140 138   POTASSIUM 4.2 4.6 4.0   CHLORIDE 105 107 107   CO2 28 27 27   BUN 49* 47* 49*   CR 3.33* 3.37* 3.36*   ANIONGAP 3 6 4   ROGER 8.4* 7.7* 7.8*   * 206* 193*     Most Recent 2 LFT's:  Recent Labs   Lab Test 11/23/19  0545 11/19/19  1235   AST 6 12   ALT <6 <6   ALKPHOS 49 69   BILITOTAL 0.2 <0.1*     Most Recent INR's and Anticoagulation Dosing History:  Anticoagulation Dose History     Recent Dosing and Labs Latest Ref Rng & Units 9/2/2016 3/8/2017 4/26/2017 4/26/2017 11/6/2017 2/28/2019 11/19/2019    INR 0.86 - 1.14 1.09 1.01 1.06 1.03 1.09 1.09 1.12        Most Recent 3 Troponin's:  Recent Labs   Lab Test 06/02/19  2028 04/24/19  1258 03/01/19  1119  02/28/19  0018   TROPI <0.015 <0.015 <0.015   < >  --    TROPONIN  --   --   --   --  0.00    < > = values in this interval not displayed.     Most Recent Cholesterol Panel:  Recent Labs   Lab Test 12/05/16  1210   CHOL 164   LDL 64   HDL 46*   TRIG 272*     Most Recent 6 Bacteria Isolates From Any Culture (See EPIC Reports for Culture Details):  Recent Labs   Lab Test 11/19/19  1327 11/19/19  1245 11/19/19  1235 11/03/19 2008 11/02/19  1521 11/01/19  1549   CULT No growth 50,000 to 100,000 colonies/mL  Candida albicans / dubliniensis  Candida albicans and Candida dubliniensis are not routinely speciated  Susceptibility testing not routinely done  * No growth No growth No growth No growth     Most Recent TSH, T4 and A1c Labs:  Recent Labs   Lab Test 11/01/19  0920  07/01/19  1208   TSH  --   --  2.43   T4  --   --  0.82   A1C 5.8*   < >  --     < > = values in this interval not displayed.     Results for orders placed or performed during the hospital encounter of 11/19/19   IR PICC Placement > 5 Yrs of Age    Narrative    INTERVENTIONAL RADIOLOGY PICC PLACEMENT > 5  YEARS OF AGE 11/19/2019  3:40 PM    HISTORY: 74-year-old patient with need for long-term IV access for IV  antibiotic administration.    TECHNIQUE: Patient was brought to the interventional radiology  department after informed consent obtained. Patient was placed in a  supine position. Skin overlying the right arm was prepped and draped  in standard sterile fashion. Ultrasound was used to visualize a single  patent brachial vein. Basilic and cephalic veins not identified.  Ultrasound images were saved, documenting patency of the brachial  vein. After 1% lidocaine administration, with continuous ultrasound  guidance a micropuncture kit was used to access the brachial vein. A  wire was advanced, over which a peel-away sheath was placed. A 4  Turkish single lumen power PICC was then advanced through the sheath  until tip at the right atrial/SVC junction. PICC was secured in  position, aspirated well, and was flushed with normal saline.    Maximal Sterile Barrier Technique Utilized: Cap AND mask AND sterile  gown AND sterile gloves AND sterile full body drape AND hand hygiene  AND skin preparation 2% chlorhexidine for cutaneous antisepsis (or  acceptable alternative antiseptics).  Sterile Ultrasound Technique  Utilized ?Sterile gel AND sterile probe covers.    Sedation: None  Fluoroscopic time: 1 minute  Air Kerma: 12.6 mGy  Contrast: None    LOCAL ANESTHETIC: 5 mL of 1% lidocaine.    FINDINGS: Single spot fluoroscopic image confirms appropriate  placement of right upper extremity PICC with tip at the right  atrial/SVC junction.      Impression    IMPRESSION: Successful placement of right upper extremity brachial  vein PICC. The PICC is ready for immediate use.    RODRICK SUAREZ MD   CT Head w/o Contrast    Narrative    CT SCAN OF THE HEAD WITHOUT CONTRAST   11/20/2019 4:30 PM     HISTORY: Lethargy.    TECHNIQUE:  Axial images of the head and coronal reformations without  IV contrast material.  Radiation dose for this  scan was reduced using  automated exposure control, adjustment of the mA and/or kV according  to patient size, or iterative reconstruction technique.    COMPARISON: 10/31/2019.    FINDINGS: Mild to moderate cerebral atrophy is present. Patchy white  matter changes are seen in both hemispheres without mass effect. There  is no evidence for intracranial hemorrhage, mass effect, acute  infarct, or skull fracture.      Impression    IMPRESSION: Chronic changes. No evidence for intracranial hemorrhage  or any acute process.    GIL TIAN MD     *Note: Due to a large number of results and/or encounters for the requested time period, some results have not been displayed. A complete set of results can be found in Results Review.

## 2019-11-26 NOTE — DISCHARGE INSTRUCTIONS
Your doctor has ordered home care to help you after your hospital stay.  They will contact you regarding your first visit.  This service will be provided by [facility].  If you have not received a call within 48 hours of discharge, please call them at [phone].          The hospital utilizes their contracted medical transportation company, [employer] ([employer]).    You may appreciate being aware of other companies who can provide stretcher transportation:     [employer]            [email or web address]   Rates   We accept payments through:   [employer], [employer], [employer], Medical [employer], [employer] or (MTM), P, and Private Pay clients.   Our Private Pay rates are:   Type  Fee: Per Mile   Wheelchair: $25.00  $2.50 per mile    Ambulatory: $20.00  $2.50 per mile    Stretcher:  $125.00 Pick  $3.00 per mile   [employer] accepts all major credit cards from Private Pay clients.   Please call [phone] to schedule a ride or to talk to a [employer] representative   [address] [place] [address], [address], [place] [address] Phone: [phone] Fax: [phone]     Here are some other local companies that can help you get to and from locations at a lower cost:

## 2019-11-26 NOTE — PLAN OF CARE
A&O. VSS ex BP slightly elevated, on RA ex pt wanting to have CPAP on frequently.  IS encouraged. LS diminished.  BS+, BM+, large very loose BM incontinent episode. Bedding change/washing per care providers x3 to assist with turning and special attention to catheter cleanliness.  Wound on coccyx cares completed x2  Bilateral skin folds cleaned, with small skin tears, scant serosanguinous drainage.  Frequent Repo offered/assisted as able.  Johnson catheter in place- chronic.  Previous R toe amputation site open to air.  Edema on BLE. IV lasix one time dose given.

## 2019-11-29 NOTE — PROGRESS NOTES
[facility] now requests orders and shares plan of care/discharge summaries for some patients through [employer].  Please REPLY TO THIS MESSAGE OR ROUTE BACK TO THE AUTHOR in order to give authorization for orders when needed.  This is considered a verbal order, you will still receive a faxed copy of orders for signature.  Thank you for your assistance in improving collaboration for our patients.    ORDER    Requesting ongoing Home Care orders following hospitalization.     Requesting SN 2w1, 2 PRN  Catheter changes every other week.   HHA 3w1, 1w1   SW assessment    Thank you,  London Potter, RN Case Manager   [phone]  [email or web address]

## 2019-12-02 NOTE — TELEPHONE ENCOUNTER
Reason for Call:  Home Health Care    London with [facility] called regarding (reason for call): Home care orders    Orders are needed for this patient. Skilled Nursing    PT:     OT:     Skilled Nursin times a week for 1 week with 2 PRN.    Home Health Aide: 3 times a week for 1 week    Social work assessment.      Pt Provider: Klaudia Barcenas    Phone Number Homecare Nurse can be reached at: [phone]    Can we leave a detailed message on this number? YES    Phone number patient can be reached at: Home number on file [phone] (home)    Best Time: Anytime    Call taken on 2019 at 9:32 AM by Nanda Horan

## 2019-12-04 NOTE — PROGRESS NOTES
"Gem Jade is a 74 year old female who is being evaluated via a billable telephone visit.      The patient has been notified of following:     \"This telephone visit will be conducted via a call between you and your physician/provider. We have found that certain health care needs can be provided without the need for a physical exam.  This service lets us provide the care you need with a short phone conversation.  If a prescription is necessary we can send it directly to your pharmacy.  If lab work is needed we can place an order for that and you can then stop by our lab to have the test done at a later time.    If during the course of the call the physician/provider feels a telephone visit is not appropriate, you will not be charged for this service.\"     Consent has been obtained for this service by 1 care team member: yes. See the scanned image in the medical record.    Gem Jade complains of  No chief complaint on file.      I have reviewed and updated the patient's Past Medical History, Social History, Family History and Medication List.    ALLERGIES  Atorvastatin calcium; Codeine; Darvocet [propoxyphene n-apap]; Hydromorphone; Levofloxacin; Morphine; Niaspan [niacin]; Percocet [oxycodone-acetaminophen]; and Vicodin [hydrocodone-acetaminophen]    Peyton Raymond CMA   (MA signature)    Additional provider notes: {ACUTE SUPERBRIEF LIST:452522}  {Chronic Problem SUPERLIST:125578}    Assessment/Plan:  {visit diagnosis:914524}    I have reviewed the note as documented above.  This accurately captures the substance of my conversation with the patient.  ***    Total time of call between patient and provider was *** minutes     {signature options:842018}      "

## 2019-12-05 NOTE — TELEPHONE ENCOUNTER
Reason for Call:  New Script    Detailed comments: Berenice had called stating that the patient needs a new script for ondansetron (ZOFRAN ODT) 4 MG ODT tab and gentamicin irrigation    Berenice's tel: [phone]    Best Time: Anytime    Can we leave a detailed message on this number? YES    Call taken on 12/5/2019 at 1:58 PM by Nanda Horan

## 2019-12-05 NOTE — TELEPHONE ENCOUNTER
Zofran 4mg refilled per [facility] protocol. Gentamicin irrigation has 1 RF, info called to Gem.  Marivel Klein RN

## 2019-12-10 NOTE — PROGRESS NOTES
[facility] now requests orders and shares plan of care/discharge summaries for some patients through [employer].  Please REPLY TO THIS MESSAGE OR ROUTE BACK TO THE AUTHOR in order to give authorization for orders when needed.  This is considered a verbal order, you will still receive a faxed copy of orders for signature.  Thank you for your assistance in improving collaboration for our patients.    ORDER    SW assesssment to assist with community resources and long term planning. FIORDALIZA CornellSW   negative...

## 2019-12-11 NOTE — TELEPHONE ENCOUNTER
Reason for Call:  Other call back: Orders    Detailed comments:  Home Care called requesting orders for 1 social work visit to assist with community resources. Writer could not understand caller's name.     Phone Number RN can be reached at:   [phone]    Best Time: Any    Can we leave a detailed message on this number? YES    Call taken on 12/11/2019 at 8:38 AM by Naheed Segura

## 2019-12-12 NOTE — TELEPHONE ENCOUNTER
Ross Elizabeth MD Blaisdell, Christin Rebecca, RN         Anemia services is fine.     She is home bound and confined to her bed.  She never seen in clinic, so our care if very limited.     She also has a kidney transplant biopsy a couple of years ago that showed severe chronic changes.  A creatinine of ~ 3 in her is almost end stage due to her lack of muscle mass.     Anyway, okay to refer her for Anemia Services.  Would do nothing about her creatinine, nor would I ever biopsy her again.  If it worsens, dialysis will need to be discussed.     Luis    Previous Messages            Associated Results     Result Notes for Tacrolimus level     Notes recorded by Phylicia Damian, RN on 12/11/2019 at 10:05 AM CST  Creatinine 2.90 down from 2 weeks ago 11/26/19 3.33 (baseline 1.6-1.8); GFR 15-18; Being treated with home antibiotic infusions for recurrent UTI-recent hospitalization    Hgb 8.1 steady trend down-Need anemia services?    Tac level 6.6 (goal 4-6) no dose change at this time

## 2019-12-12 NOTE — TELEPHONE ENCOUNTER
Order entered for anemia services referral per Dr. Elizabeth. Notified , Chang, of referral. He brought up that her Creatinine was 2.90 and wondered what was thought about that level. Discussed the result note comments which were noticeably not to his liking. He also took the opportunity to express his frustration with the most recent hospitalization Gem had. Ultimately, he verbalized understanding of the fact that anemia services would be contacting him.

## 2019-12-12 NOTE — PATIENT INSTRUCTIONS
Please continue your low dose Gabapentin until labs have returned     I will check in in one month

## 2019-12-12 NOTE — TELEPHONE ENCOUNTER
Spoke with patient and relayed message per provider note below. Verbalized understanding.    Sumaya Hernandez RN   [facility]

## 2019-12-12 NOTE — TELEPHONE ENCOUNTER
Labs reviewed, ok to increase Gabapentin to 600mg at bedtime but no more.  Needs to be given in a single dose at bedtime.  Please let her and her  know.    Klaudia MTZ

## 2019-12-14 NOTE — TELEPHONE ENCOUNTER
Patient is requesting Gabapentin 100mg and 300mg. States she takes 100mg-1TID and the 300mg- 2BID. Requests 90 day supplies on both please. Please send new rxs to [facility]. Thank you

## 2019-12-16 NOTE — TELEPHONE ENCOUNTER
I believe this is your patient Klaudia.  Let me know if you would like me to handle this.   Kelsie Perez CNP, APNP  [facility]

## 2019-12-16 NOTE — TELEPHONE ENCOUNTER
Routing refill request to provider for review/approval because:  Drug not on the [facility] refill protocol     LOV: 12/10/2019 virtual visit.    Thank You!  Della Osborne, RN  Triage Nurse

## 2019-12-16 NOTE — LETTER
December 16, 2019    Dear Gem,    This letter is regarding your medication refills.    For the best outcome for your transplant and in order for us to refill your prescriptions, we encourage you to have a yearly clinic appointment with a physician and to have current labs. If you are unable to return to our transplant center there are several alternatives. Please call your coordinator to discuss them. .  To make an appointment with a physician here at [facility], please call:  The Transplant Office at [phone] or [phone].  .      The Transplant Staff at [facility]

## 2019-12-16 NOTE — TELEPHONE ENCOUNTER
Routing refill request to provider for review/approval because:  Drug not on the [facility] refill protocol     : Unable to check , writer has not been approved by provider as delegate    Patient reports she is taking:   - 100mg- 1 capsules TID  - 300mg- 2 capsules BID    Thank You!  Della Osborne, RN  Triage Nurse

## 2019-12-16 NOTE — TELEPHONE ENCOUNTER
Requested Prescriptions   Pending Prescriptions Disp Refills     predniSONE (DELTASONE) 5 MG tablet 90 tablet 3     Sig: Take 1 tablet (5 mg) by mouth daily       There is no refill protocol information for this order        tacrolimus (GENERIC EQUIVALENT) 1 MG capsule 120 capsule 11     Sig: Take 2 capsules (2 mg) by mouth 2 times daily       There is no refill protocol information for this order

## 2019-12-17 NOTE — TELEPHONE ENCOUNTER
Routing back to PCP for dose direction on Neurontin -see note from 12/12 as well.  Marivel Klein RN

## 2019-12-17 NOTE — TELEPHONE ENCOUNTER
Labs reviewed, ok to increase Gabapentin to 600mg at bedtime but no more.  Needs to be given in a single dose at bedtime.  Patient and her  were informed on 12/12, please review with patient again, new rx sent to the pharmacy    Klaudia MTZ

## 2019-12-18 NOTE — TELEPHONE ENCOUNTER
Reason for Call:  Other prescription    Detailed comments: [facility] called stating they need the patient's insulin aspart (NOVOLOG FLEXPEN) 100 UNIT/ML pen eprescribed, not a printed prescription.    Pharmacy #: [phone] (number they called from)    Best Time: Any    Can we leave a detailed message on this number? YES    Call taken on 12/18/2019 at 1:46 PM by Naheed Segura

## 2019-12-18 NOTE — TELEPHONE ENCOUNTER
Routing to PCP for failed protocol    Routing to PShort Acting Insulin Protocol Sthvlr36/18 10:08 AM   LDL on file in past 12 months    Microalbumin on file in past 12 months

## 2019-12-19 NOTE — PROGRESS NOTES
SUBJECTIVE/OBJECTIVE:                Gem Jade is a 74 year old female called for a transitions of care visit.  She was discharged from [facility] on 11/26/19 for Recurrent catheter-associated urinary tract infections. Covisit with PCP. Visit was completed with her  Keshawn because pt was sleeping and has been delirious today.     Chief Complaint: Questions about state of her kidney function.    Tobacco:  reports that she quit smoking about 32 years ago. She quit after 17.00 years of use. She has never used smokeless tobacco.  Alcohol: not currently using    Medication Adherence/Access:  no issues reported  Patient uses pill box(es) set up by [employer].    CKD: s/p transplant 20 yrs ago.   wanting a discussion around expected outcomes, possibility for dialysis to be recommended.  Also started discussion around palliative care and code status today. See nephrology notes Dr Elizabeth 12/12/19.  Creatinine   Date Value Ref Range Status   12/10/2019 2.90 (H) 0.52 - 1.04 mg/dL Final     Diabetic Neuropathy: Currently taking Gabapentin 600mg daily.  Since changing the dosing frequency, pain has been much worse in her feet and legs.    -Pt was previously on amitriptyline 50mg daily and oxcarbazepine 600mg bid but discontinued due to multiple hospitalizations for encephalopathy.    Nightmares: out of prazosin 2mg and waiting for the shipment. Pt having nightmares, woke up this morning early AM screaming and seeing mice.     Today's Vitals: There were no vitals taken for this visit. - phone call    ASSESSMENT:                 Medication Adherence: excellent, no issues identified    CKD: worsening. Palliative care discussion today and will be continued with PCP at home visit next month.     Diabetic neuropathy: needs improvement. Will trial gabapentin back at 300mg BID - this is higher dose than recommended per [employer] but  agreeable to trial for pain reduction and will continue to monitor her mental  status.     Nightmares: needs improvement. She would benefit from restarting prazosin.  Keshawn to follow-up with pharmacy.      PLAN:                  Post Discharge Medication Reconciliation Status: discharge medications reconciled and changed, per note/orders (see AVS).    1. Keshawn to follow-up with the pharmacy about prazosin - restart when she gets it  2. Change gabapentin to 300mg BID    I spent 40 minutes with this patient today. All changes were made via collaborative practice agreement with Marivel Barcenas. A copy of the visit note was provided to the patient's primary care provider.    Will follow up in 1 month.    The patient was sent via [employer] a summary of these recommendations as an after visit summary.    Conchita Toledo, PharmD, BCACP

## 2019-12-19 NOTE — TELEPHONE ENCOUNTER
TC to patient's  about patient's current condition, discussed palliative care and end of life, dialysis, and pain control.  Discussed gabapentin dosing, we will continue the same dose and divide the dose, 300mg in am and 300mg in the evening and then re-evaluate after the weekend.  Patient's  very teary during conversation.  He will call on Monday to update on her condition. Patient was confused today but had missed her Prazocin for a couple of days, states he never got his refill.  He will call [facility] to ask about it     Klaudia Barcenas P  MEDICAL LEAD

## 2019-12-24 NOTE — PROGRESS NOTES
Clinic Care Coordination Contact    Follow Up Progress Note      Assessment: pro-active outreach call placed to patient. PCP and MTM had phone conversation with patient's  last week about palliative care.  did not tell patient about the call. Reviewed advance care planning in detail with patient. She verbalizes quality of life and comfort and tolerable pain to be her goals rather than lengthening her life. Thinks about when her kidney fails as she knows she doesn't tolerate dialysis. Had a good conversation with her previous nephrologist who told her it is a peaceful death and she feels reassured by this. PCP changed her gabapentin dosing times and  started this 2 days ago and her pain is improved.     Goals addressed this encounter:   Goals Addressed                 This Visit's Progress      Medical (pt-stated)   Not on track     #1-Goal Statement: I will reduce the number of UTI's I experience  Measure of Success: reduction in number of UTI's  Supportive Steps to Achieve: home care nurse to request orders for more frequent catheter changes and increased pericare per week  Barriers: resistant UTI's  Strengths: assistance as above  Date to Achieve By: 12-1-19          Other (pt-stated)   On Hold     2-Goal Statement: I want to be able to get my wheelchair into our van so my  and I can go out to dinner  Measure of Success: gets wheelchair into van and goes to dinner  Supportive Steps to Achieve:  bought new christiana lift  Barriers: bedbound  Strengths: wants to get out of bed   Date to Achieve By: 1-1-20  Patient expressed understanding of goal: yes            Other (pt-stated)        3-Goal Statement: I will complete advance care planning  Date Goal set: 12-24-19  Date to Achieve By: 3-24-20  Patient expressed understanding of goal: yes  Action steps to achieve this goal:  1. I will read information about advance care planning sent to me by my RN CC  2. I will participate in  continued conversations about advance care planning and palliative care with my RN CC and PCP  3. I will talk with my family about my end of life wishes                 Intervention/Education provided during outreach: detailed discussion about advance care planning reviewed with patient          Plan:   RN CC will mail written information regarding advance care planning to patient to review and discuss with PCP at next visit 1-7-20  Care Coordinator will follow up in approximately one month    Porsha Khalil RN  [facility]-Care Coordination  [facility]  [facility]  [phone]

## 2019-12-31 NOTE — TELEPHONE ENCOUNTER
Homecare orders faxed to [phone]. Documents were placed in the med room for scanning.      Stacy Sandifer,MA

## 2019-12-31 NOTE — TELEPHONE ENCOUNTER
Requested Prescriptions   Pending Prescriptions Disp Refills     gentamicin irrigation 1 Bottle 1     Sig: Irrigate rico cath 3x a week  60ml of 400mg of gentamicinin uu0830pc of 0.9% Sodium Chloride  On hold until done with current antibiotics   Last Written Prescription Date:  11/15/2019  Last Fill Quantity: 1 bottle,  # refills: 1   Last office visit: 12/10/2019 with prescribing provider:     Future Office Visit:   Next 5 appointments (look out 90 days)    Jan 07, 2020  3:30 PM CST  Telephone Visit with ANTHONY Antonio CNP  [facility] ([facility]) [address]  [address]  [facility] [id number] [phone]             There is no refill protocol information for this order

## 2019-12-31 NOTE — TELEPHONE ENCOUNTER
Homecare order faxed to [phone]. Documents placed in the med room for scanning.      Stacy Sandifer,MA

## 2019-12-31 NOTE — TELEPHONE ENCOUNTER
Prescription approved per [facility] Refill Protocol.  Frances Lira RN on 12/31/2019 at 1:49 PM

## 2020-01-01 ENCOUNTER — ALLIED HEALTH/NURSE VISIT (OUTPATIENT)
Dept: NURSING | Facility: CLINIC | Age: 75
End: 2020-01-01
Payer: MEDICARE

## 2020-01-01 ENCOUNTER — PATIENT OUTREACH (OUTPATIENT)
Dept: NURSING | Facility: CLINIC | Age: 75
End: 2020-01-01
Payer: MEDICARE

## 2020-01-01 ENCOUNTER — TELEPHONE (OUTPATIENT)
Dept: TRANSPLANT | Facility: CLINIC | Age: 75
End: 2020-01-01

## 2020-01-01 ENCOUNTER — PATIENT OUTREACH (OUTPATIENT)
Dept: CARE COORDINATION | Facility: CLINIC | Age: 75
End: 2020-01-01

## 2020-01-01 ENCOUNTER — VIRTUAL VISIT (OUTPATIENT)
Dept: FAMILY MEDICINE | Facility: CLINIC | Age: 75
End: 2020-01-01
Payer: MEDICARE

## 2020-01-01 ENCOUNTER — MEDICAL CORRESPONDENCE (OUTPATIENT)
Dept: HEALTH INFORMATION MANAGEMENT | Facility: CLINIC | Age: 75
End: 2020-01-01

## 2020-01-01 ENCOUNTER — TELEPHONE (OUTPATIENT)
Dept: PHARMACY | Facility: CLINIC | Age: 75
End: 2020-01-01

## 2020-01-01 ENCOUNTER — TELEPHONE (OUTPATIENT)
Dept: FAMILY MEDICINE | Facility: CLINIC | Age: 75
End: 2020-01-01

## 2020-01-01 ENCOUNTER — HOSPITAL ENCOUNTER (OUTPATIENT)
Dept: LAB | Facility: CLINIC | Age: 75
End: 2020-01-01
Attending: INTERNAL MEDICINE
Payer: MEDICARE

## 2020-01-01 ENCOUNTER — OFFICE VISIT (OUTPATIENT)
Dept: FAMILY MEDICINE | Facility: CLINIC | Age: 75
End: 2020-01-01
Payer: MEDICARE

## 2020-01-01 ENCOUNTER — VIRTUAL VISIT (OUTPATIENT)
Dept: PHARMACY | Facility: CLINIC | Age: 75
End: 2020-01-01
Payer: COMMERCIAL

## 2020-01-01 ENCOUNTER — TRANSFERRED RECORDS (OUTPATIENT)
Dept: HEALTH INFORMATION MANAGEMENT | Facility: CLINIC | Age: 75
End: 2020-01-01

## 2020-01-01 ENCOUNTER — ALLIED HEALTH/NURSE VISIT (OUTPATIENT)
Dept: PHARMACY | Facility: CLINIC | Age: 75
End: 2020-01-01
Payer: COMMERCIAL

## 2020-01-01 ENCOUNTER — TELEPHONE (OUTPATIENT)
Dept: CARE COORDINATION | Facility: CLINIC | Age: 75
End: 2020-01-01

## 2020-01-01 ENCOUNTER — DOCUMENTATION ONLY (OUTPATIENT)
Dept: OTHER | Facility: CLINIC | Age: 75
End: 2020-01-01

## 2020-01-01 VITALS — HEART RATE: 54 BPM | OXYGEN SATURATION: 98 %

## 2020-01-01 VITALS — OXYGEN SATURATION: 97 % | HEART RATE: 69 BPM

## 2020-01-01 DIAGNOSIS — N18.30 CKD (CHRONIC KIDNEY DISEASE) STAGE 3, GFR 30-59 ML/MIN (H): ICD-10-CM

## 2020-01-01 DIAGNOSIS — E11.42 TYPE 2 DIABETES MELLITUS WITH DIABETIC POLYNEUROPATHY, WITH LONG-TERM CURRENT USE OF INSULIN (H): ICD-10-CM

## 2020-01-01 DIAGNOSIS — N18.5 STAGE 5 CHRONIC KIDNEY DISEASE NOT ON CHRONIC DIALYSIS (H): ICD-10-CM

## 2020-01-01 DIAGNOSIS — E11.65 TYPE 2 DIABETES MELLITUS WITH HYPERGLYCEMIA, WITH LONG-TERM CURRENT USE OF INSULIN (H): Primary | ICD-10-CM

## 2020-01-01 DIAGNOSIS — E87.5 HYPERKALEMIA: Primary | ICD-10-CM

## 2020-01-01 DIAGNOSIS — N18.6 END STAGE CHRONIC KIDNEY DISEASE (H): ICD-10-CM

## 2020-01-01 DIAGNOSIS — N39.0 RECURRENT UTI: ICD-10-CM

## 2020-01-01 DIAGNOSIS — K21.9 GASTROESOPHAGEAL REFLUX DISEASE, ESOPHAGITIS PRESENCE NOT SPECIFIED: ICD-10-CM

## 2020-01-01 DIAGNOSIS — E03.8 OTHER SPECIFIED HYPOTHYROIDISM: ICD-10-CM

## 2020-01-01 DIAGNOSIS — Z94.0 KIDNEY REPLACED BY TRANSPLANT: ICD-10-CM

## 2020-01-01 DIAGNOSIS — R44.1 HALLUCINATIONS, VISUAL: ICD-10-CM

## 2020-01-01 DIAGNOSIS — Z53.9 DIAGNOSIS NOT YET DEFINED: Primary | ICD-10-CM

## 2020-01-01 DIAGNOSIS — E11.42 DIABETIC POLYNEUROPATHY ASSOCIATED WITH TYPE 2 DIABETES MELLITUS (H): ICD-10-CM

## 2020-01-01 DIAGNOSIS — Z79.4 TYPE 2 DIABETES MELLITUS WITH DIABETIC POLYNEUROPATHY, WITH LONG-TERM CURRENT USE OF INSULIN (H): Primary | ICD-10-CM

## 2020-01-01 DIAGNOSIS — R44.3 HALLUCINATIONS: Primary | ICD-10-CM

## 2020-01-01 DIAGNOSIS — F51.5 NIGHTMARES: ICD-10-CM

## 2020-01-01 DIAGNOSIS — I26.99 PULMONARY EMBOLISM AND INFARCTION (H): ICD-10-CM

## 2020-01-01 DIAGNOSIS — Z71.89 ACP (ADVANCE CARE PLANNING): Primary | ICD-10-CM

## 2020-01-01 DIAGNOSIS — N39.0 URINARY TRACT INFECTION ASSOCIATED WITH INDWELLING URETHRAL CATHETER, INITIAL ENCOUNTER (H): Primary | ICD-10-CM

## 2020-01-01 DIAGNOSIS — G93.41 METABOLIC ENCEPHALOPATHY: ICD-10-CM

## 2020-01-01 DIAGNOSIS — T83.511D URINARY TRACT INFECTION ASSOCIATED WITH INDWELLING URETHRAL CATHETER, SUBSEQUENT ENCOUNTER: ICD-10-CM

## 2020-01-01 DIAGNOSIS — R41.82 ALTERED MENTAL STATUS: ICD-10-CM

## 2020-01-01 DIAGNOSIS — Z97.8 CHRONIC INDWELLING FOLEY CATHETER: ICD-10-CM

## 2020-01-01 DIAGNOSIS — Z79.4 TYPE 2 DIABETES MELLITUS WITH DIABETIC POLYNEUROPATHY, WITH LONG-TERM CURRENT USE OF INSULIN (H): ICD-10-CM

## 2020-01-01 DIAGNOSIS — E11.43 DIABETIC GASTROPARESIS (H): ICD-10-CM

## 2020-01-01 DIAGNOSIS — E11.43 DIABETIC GASTROPARESIS (H): Primary | ICD-10-CM

## 2020-01-01 DIAGNOSIS — Z79.4 TYPE 2 DIABETES MELLITUS WITH HYPERGLYCEMIA, WITH LONG-TERM CURRENT USE OF INSULIN (H): ICD-10-CM

## 2020-01-01 DIAGNOSIS — Z97.8 CHRONIC INDWELLING FOLEY CATHETER: Primary | ICD-10-CM

## 2020-01-01 DIAGNOSIS — G93.41 METABOLIC ENCEPHALOPATHY: Primary | ICD-10-CM

## 2020-01-01 DIAGNOSIS — N18.6 END STAGE CHRONIC KIDNEY DISEASE (H): Primary | ICD-10-CM

## 2020-01-01 DIAGNOSIS — I10 BENIGN ESSENTIAL HYPERTENSION: ICD-10-CM

## 2020-01-01 DIAGNOSIS — G44.209 TENSION HEADACHE: ICD-10-CM

## 2020-01-01 DIAGNOSIS — Z79.4 TYPE 2 DIABETES MELLITUS WITH HYPERGLYCEMIA, WITH LONG-TERM CURRENT USE OF INSULIN (H): Primary | ICD-10-CM

## 2020-01-01 DIAGNOSIS — F22 PARANOIA (H): ICD-10-CM

## 2020-01-01 DIAGNOSIS — Z94.0 KIDNEY REPLACED BY TRANSPLANT: Primary | ICD-10-CM

## 2020-01-01 DIAGNOSIS — Z94.0 KIDNEY TRANSPLANTED: Primary | ICD-10-CM

## 2020-01-01 DIAGNOSIS — Z74.01 BEDBOUND: ICD-10-CM

## 2020-01-01 DIAGNOSIS — R41.0 MENTAL CONFUSION: Primary | ICD-10-CM

## 2020-01-01 DIAGNOSIS — R06.09 CHRONIC DYSPNEA: ICD-10-CM

## 2020-01-01 DIAGNOSIS — J00 ACUTE NASOPHARYNGITIS: ICD-10-CM

## 2020-01-01 DIAGNOSIS — E11.42 TYPE 2 DIABETES MELLITUS WITH DIABETIC POLYNEUROPATHY, WITH LONG-TERM CURRENT USE OF INSULIN (H): Primary | ICD-10-CM

## 2020-01-01 DIAGNOSIS — J02.9 ACUTE PHARYNGITIS, UNSPECIFIED ETIOLOGY: ICD-10-CM

## 2020-01-01 DIAGNOSIS — E11.42 DIABETIC POLYNEUROPATHY ASSOCIATED WITH TYPE 2 DIABETES MELLITUS (H): Primary | ICD-10-CM

## 2020-01-01 DIAGNOSIS — Z53.9 ERRONEOUS ENCOUNTER--DISREGARD: Primary | ICD-10-CM

## 2020-01-01 DIAGNOSIS — E11.9 TYPE 2 DIABETES MELLITUS (H): ICD-10-CM

## 2020-01-01 DIAGNOSIS — E78.5 HYPERLIPIDEMIA LDL GOAL <100: ICD-10-CM

## 2020-01-01 DIAGNOSIS — R06.02 SOB (SHORTNESS OF BREATH): ICD-10-CM

## 2020-01-01 DIAGNOSIS — R42 VERTIGO: ICD-10-CM

## 2020-01-01 DIAGNOSIS — E11.65 TYPE 2 DIABETES MELLITUS WITH HYPERGLYCEMIA, WITH LONG-TERM CURRENT USE OF INSULIN (H): ICD-10-CM

## 2020-01-01 DIAGNOSIS — G47.33 OBSTRUCTIVE SLEEP APNEA SYNDROME: ICD-10-CM

## 2020-01-01 DIAGNOSIS — N39.0 URINARY TRACT INFECTION ASSOCIATED WITH INDWELLING URETHRAL CATHETER, SUBSEQUENT ENCOUNTER: ICD-10-CM

## 2020-01-01 DIAGNOSIS — N39.0 COMPLICATED UTI (URINARY TRACT INFECTION): Primary | ICD-10-CM

## 2020-01-01 DIAGNOSIS — R19.7 DIARRHEA, UNSPECIFIED TYPE: ICD-10-CM

## 2020-01-01 DIAGNOSIS — K31.84 DIABETIC GASTROPARESIS (H): ICD-10-CM

## 2020-01-01 DIAGNOSIS — Z48.22 ENCOUNTER FOR AFTERCARE FOLLOWING KIDNEY TRANSPLANT: ICD-10-CM

## 2020-01-01 DIAGNOSIS — I10 BENIGN ESSENTIAL HYPERTENSION: Primary | ICD-10-CM

## 2020-01-01 DIAGNOSIS — Z71.89 ADVANCED CARE PLANNING/COUNSELING DISCUSSION: ICD-10-CM

## 2020-01-01 DIAGNOSIS — K31.84 DIABETIC GASTROPARESIS (H): Primary | ICD-10-CM

## 2020-01-01 DIAGNOSIS — Z00.00 ROUTINE GENERAL MEDICAL EXAMINATION AT A HEALTH CARE FACILITY: Primary | ICD-10-CM

## 2020-01-01 DIAGNOSIS — R40.4 TRANSIENT ALTERATION OF AWARENESS: ICD-10-CM

## 2020-01-01 DIAGNOSIS — Z94.0 KIDNEY TRANSPLANTED: ICD-10-CM

## 2020-01-01 DIAGNOSIS — T83.511A URINARY TRACT INFECTION ASSOCIATED WITH INDWELLING URETHRAL CATHETER, INITIAL ENCOUNTER (H): Primary | ICD-10-CM

## 2020-01-01 DIAGNOSIS — Z79.60 LONG-TERM USE OF IMMUNOSUPPRESSANT MEDICATION: ICD-10-CM

## 2020-01-01 DIAGNOSIS — E55.9 VITAMIN D DEFICIENCY: ICD-10-CM

## 2020-01-01 DIAGNOSIS — Z71.89 COUNSELING AND COORDINATION OF CARE: Primary | ICD-10-CM

## 2020-01-01 DIAGNOSIS — D50.9 IRON DEFICIENCY ANEMIA, UNSPECIFIED IRON DEFICIENCY ANEMIA TYPE: ICD-10-CM

## 2020-01-01 LAB
ALBUMIN SERPL-MCNC: 2.5 G/DL (ref 3.4–5)
ALBUMIN SERPL-MCNC: 2.6 G/DL (ref 3.4–5)
ALBUMIN SERPL-MCNC: 2.6 G/DL (ref 3.4–5)
ALBUMIN SERPL-MCNC: 2.7 G/DL (ref 3.4–5)
ALBUMIN SERPL-MCNC: 2.7 G/DL (ref 3.4–5)
ALBUMIN SERPL-MCNC: 2.9 G/DL (ref 3.4–5)
ALBUMIN SERPL-MCNC: 3.1 G/DL (ref 3.4–5)
ALBUMIN UR-MCNC: 100 MG/DL
ALP SERPL-CCNC: 50 U/L (ref 40–150)
ALP SERPL-CCNC: 55 U/L (ref 40–150)
ALP SERPL-CCNC: 55 U/L (ref 40–150)
ALP SERPL-CCNC: 58 U/L (ref 40–150)
ALP SERPL-CCNC: 61 U/L (ref 40–150)
ALP SERPL-CCNC: 67 U/L (ref 40–150)
ALP SERPL-CCNC: 70 U/L (ref 40–150)
ALP SERPL-CCNC: 74 U/L (ref 40–150)
ALP SERPL-CCNC: 77 U/L (ref 40–150)
ALT SERPL W P-5'-P-CCNC: 12 U/L (ref 0–50)
ALT SERPL W P-5'-P-CCNC: 13 U/L (ref 0–50)
ALT SERPL W P-5'-P-CCNC: 14 U/L (ref 0–50)
ALT SERPL W P-5'-P-CCNC: 16 U/L (ref 0–50)
ALT SERPL W P-5'-P-CCNC: 16 U/L (ref 0–50)
ALT SERPL W P-5'-P-CCNC: 17 U/L (ref 0–50)
ALT SERPL W P-5'-P-CCNC: 22 U/L (ref 0–50)
ALT SERPL W P-5'-P-CCNC: 25 U/L (ref 0–50)
ALT SERPL W P-5'-P-CCNC: 39 U/L (ref 0–50)
ANION GAP SERPL CALCULATED.3IONS-SCNC: 1 MMOL/L (ref 3–14)
ANION GAP SERPL CALCULATED.3IONS-SCNC: 2 MMOL/L (ref 3–14)
ANION GAP SERPL CALCULATED.3IONS-SCNC: 3 MMOL/L (ref 3–14)
ANION GAP SERPL CALCULATED.3IONS-SCNC: 4 MMOL/L (ref 3–14)
ANION GAP SERPL CALCULATED.3IONS-SCNC: 5 MMOL/L (ref 3–14)
ANION GAP SERPL CALCULATED.3IONS-SCNC: 5 MMOL/L (ref 3–14)
ANION GAP SERPL CALCULATED.3IONS-SCNC: 6 MMOL/L (ref 3–14)
ANION GAP SERPL CALCULATED.3IONS-SCNC: 7 MMOL/L (ref 3–14)
APPEARANCE UR: ABNORMAL
AST SERPL W P-5'-P-CCNC: 11 U/L (ref 0–45)
AST SERPL W P-5'-P-CCNC: 13 U/L (ref 0–45)
AST SERPL W P-5'-P-CCNC: 18 U/L (ref 0–45)
AST SERPL W P-5'-P-CCNC: 22 U/L (ref 0–45)
AST SERPL W P-5'-P-CCNC: 30 U/L (ref 0–45)
AST SERPL W P-5'-P-CCNC: 5 U/L (ref 0–45)
BACTERIA #/AREA URNS HPF: ABNORMAL /HPF
BACTERIA SPEC CULT: ABNORMAL
BACTERIA SPEC CULT: ABNORMAL
BASOPHILS # BLD AUTO: 0 10E9/L (ref 0–0.2)
BASOPHILS NFR BLD AUTO: 0.1 %
BASOPHILS NFR BLD AUTO: 0.1 %
BASOPHILS NFR BLD AUTO: 0.2 %
BILIRUB SERPL-MCNC: 0.2 MG/DL (ref 0.2–1.3)
BILIRUB SERPL-MCNC: 0.2 MG/DL (ref 0.2–1.3)
BILIRUB SERPL-MCNC: 0.3 MG/DL (ref 0.2–1.3)
BILIRUB SERPL-MCNC: 0.5 MG/DL (ref 0.2–1.3)
BILIRUB SERPL-MCNC: 0.6 MG/DL (ref 0.2–1.3)
BILIRUB UR QL STRIP: NEGATIVE
BUN SERPL-MCNC: 52 MG/DL (ref 7–30)
BUN SERPL-MCNC: 55 MG/DL (ref 7–30)
BUN SERPL-MCNC: 57 MG/DL (ref 7–30)
BUN SERPL-MCNC: 58 MG/DL (ref 7–30)
BUN SERPL-MCNC: 58 MG/DL (ref 7–30)
BUN SERPL-MCNC: 62 MG/DL (ref 7–30)
BUN SERPL-MCNC: 63 MG/DL (ref 7–30)
BUN SERPL-MCNC: 65 MG/DL (ref 7–30)
BUN SERPL-MCNC: 66 MG/DL (ref 7–30)
BUN SERPL-MCNC: 68 MG/DL (ref 7–30)
BUN SERPL-MCNC: 75 MG/DL (ref 7–30)
BUN SERPL-MCNC: 79 MG/DL (ref 7–30)
CALCIUM SERPL-MCNC: 8.2 MG/DL (ref 8.5–10.1)
CALCIUM SERPL-MCNC: 8.4 MG/DL (ref 8.5–10.1)
CALCIUM SERPL-MCNC: 8.5 MG/DL (ref 8.5–10.1)
CALCIUM SERPL-MCNC: 8.5 MG/DL (ref 8.5–10.1)
CALCIUM SERPL-MCNC: 8.6 MG/DL (ref 8.5–10.1)
CALCIUM SERPL-MCNC: 8.6 MG/DL (ref 8.5–10.1)
CALCIUM SERPL-MCNC: 8.7 MG/DL (ref 8.5–10.1)
CALCIUM SERPL-MCNC: 8.7 MG/DL (ref 8.5–10.1)
CALCIUM SERPL-MCNC: 9 MG/DL (ref 8.5–10.1)
CALCIUM SERPL-MCNC: 9 MG/DL (ref 8.5–10.1)
CALCIUM SERPL-MCNC: 9.1 MG/DL (ref 8.5–10.1)
CALCIUM SERPL-MCNC: 9.6 MG/DL (ref 8.5–10.1)
CHLORIDE SERPL-SCNC: 104 MMOL/L (ref 94–109)
CHLORIDE SERPL-SCNC: 105 MMOL/L (ref 94–109)
CHLORIDE SERPL-SCNC: 107 MMOL/L (ref 94–109)
CHLORIDE SERPL-SCNC: 108 MMOL/L (ref 94–109)
CHLORIDE SERPL-SCNC: 108 MMOL/L (ref 94–109)
CHLORIDE SERPL-SCNC: 109 MMOL/L (ref 94–109)
CHLORIDE SERPL-SCNC: 111 MMOL/L (ref 94–109)
CHLORIDE SERPL-SCNC: 111 MMOL/L (ref 94–109)
CHLORIDE SERPL-SCNC: 113 MMOL/L (ref 94–109)
CHLORIDE SERPL-SCNC: 113 MMOL/L (ref 94–109)
CO2 SERPL-SCNC: 23 MMOL/L (ref 20–32)
CO2 SERPL-SCNC: 24 MMOL/L (ref 20–32)
CO2 SERPL-SCNC: 25 MMOL/L (ref 20–32)
CO2 SERPL-SCNC: 26 MMOL/L (ref 20–32)
CO2 SERPL-SCNC: 27 MMOL/L (ref 20–32)
CO2 SERPL-SCNC: 29 MMOL/L (ref 20–32)
COLOR UR AUTO: ABNORMAL
CREAT SERPL-MCNC: 2.82 MG/DL (ref 0.52–1.04)
CREAT SERPL-MCNC: 2.98 MG/DL (ref 0.52–1.04)
CREAT SERPL-MCNC: 3.09 MG/DL (ref 0.52–1.04)
CREAT SERPL-MCNC: 3.33 MG/DL (ref 0.52–1.04)
CREAT SERPL-MCNC: 3.54 MG/DL (ref 0.52–1.04)
CREAT SERPL-MCNC: 3.57 MG/DL (ref 0.52–1.04)
CREAT SERPL-MCNC: 3.59 MG/DL (ref 0.52–1.04)
CREAT SERPL-MCNC: 3.61 MG/DL (ref 0.52–1.04)
CREAT SERPL-MCNC: 3.64 MG/DL (ref 0.52–1.04)
CREAT SERPL-MCNC: 3.74 MG/DL (ref 0.52–1.04)
CREAT SERPL-MCNC: 3.77 MG/DL (ref 0.52–1.04)
CREAT SERPL-MCNC: 4.05 MG/DL (ref 0.52–1.04)
DEPRECATED CALCIDIOL+CALCIFEROL SERPL-MC: 29 UG/L (ref 20–75)
DIFFERENTIAL METHOD BLD: ABNORMAL
EOSINOPHIL # BLD AUTO: 0.1 10E9/L (ref 0–0.7)
EOSINOPHIL # BLD AUTO: 0.2 10E9/L (ref 0–0.7)
EOSINOPHIL # BLD AUTO: 0.4 10E9/L (ref 0–0.7)
EOSINOPHIL NFR BLD AUTO: 1.9 %
EOSINOPHIL NFR BLD AUTO: 2 %
EOSINOPHIL NFR BLD AUTO: 2.3 %
EOSINOPHIL NFR BLD AUTO: 2.6 %
EOSINOPHIL NFR BLD AUTO: 3.1 %
EOSINOPHIL NFR BLD AUTO: 3.2 %
EOSINOPHIL NFR BLD AUTO: 5.5 %
ERYTHROCYTE [DISTWIDTH] IN BLOOD BY AUTOMATED COUNT: 14.7 % (ref 10–15)
ERYTHROCYTE [DISTWIDTH] IN BLOOD BY AUTOMATED COUNT: 14.8 % (ref 10–15)
ERYTHROCYTE [DISTWIDTH] IN BLOOD BY AUTOMATED COUNT: 14.8 % (ref 10–15)
ERYTHROCYTE [DISTWIDTH] IN BLOOD BY AUTOMATED COUNT: 14.9 % (ref 10–15)
ERYTHROCYTE [DISTWIDTH] IN BLOOD BY AUTOMATED COUNT: 15 % (ref 10–15)
ERYTHROCYTE [DISTWIDTH] IN BLOOD BY AUTOMATED COUNT: 15.1 % (ref 10–15)
ERYTHROCYTE [DISTWIDTH] IN BLOOD BY AUTOMATED COUNT: 15.1 % (ref 10–15)
ERYTHROCYTE [DISTWIDTH] IN BLOOD BY AUTOMATED COUNT: 15.2 % (ref 10–15)
ERYTHROCYTE [DISTWIDTH] IN BLOOD BY AUTOMATED COUNT: 15.4 % (ref 10–15)
ERYTHROCYTE [DISTWIDTH] IN BLOOD BY AUTOMATED COUNT: 15.9 % (ref 10–15)
GFR SERPL CREATININE-BSD FRML MDRD: 10 ML/MIN/{1.73_M2}
GFR SERPL CREATININE-BSD FRML MDRD: 11 ML/MIN/{1.73_M2}
GFR SERPL CREATININE-BSD FRML MDRD: 11 ML/MIN/{1.73_M2}
GFR SERPL CREATININE-BSD FRML MDRD: 12 ML/MIN/{1.73_M2}
GFR SERPL CREATININE-BSD FRML MDRD: 13 ML/MIN/{1.73_M2}
GFR SERPL CREATININE-BSD FRML MDRD: 14 ML/MIN/{1.73_M2}
GFR SERPL CREATININE-BSD FRML MDRD: 15 ML/MIN/{1.73_M2}
GFR SERPL CREATININE-BSD FRML MDRD: 16 ML/MIN/{1.73_M2}
GLUCOSE SERPL-MCNC: 116 MG/DL (ref 70–99)
GLUCOSE SERPL-MCNC: 133 MG/DL (ref 70–99)
GLUCOSE SERPL-MCNC: 145 MG/DL (ref 70–99)
GLUCOSE SERPL-MCNC: 146 MG/DL (ref 70–99)
GLUCOSE SERPL-MCNC: 150 MG/DL (ref 70–99)
GLUCOSE SERPL-MCNC: 153 MG/DL (ref 70–99)
GLUCOSE SERPL-MCNC: 155 MG/DL (ref 70–99)
GLUCOSE SERPL-MCNC: 171 MG/DL (ref 70–99)
GLUCOSE SERPL-MCNC: 219 MG/DL (ref 70–99)
GLUCOSE SERPL-MCNC: 60 MG/DL (ref 70–99)
GLUCOSE SERPL-MCNC: 86 MG/DL (ref 70–99)
GLUCOSE SERPL-MCNC: 99 MG/DL (ref 70–99)
GLUCOSE UR STRIP-MCNC: 30 MG/DL
HCT VFR BLD AUTO: 23.6 % (ref 35–47)
HCT VFR BLD AUTO: 24 % (ref 35–47)
HCT VFR BLD AUTO: 24 % (ref 35–47)
HCT VFR BLD AUTO: 24.5 % (ref 35–47)
HCT VFR BLD AUTO: 25.4 % (ref 35–47)
HCT VFR BLD AUTO: 25.7 % (ref 35–47)
HCT VFR BLD AUTO: 27 % (ref 35–47)
HCT VFR BLD AUTO: 27.2 % (ref 35–47)
HCT VFR BLD AUTO: 28.1 % (ref 35–47)
HCT VFR BLD AUTO: 30.7 % (ref 35–47)
HGB BLD-MCNC: 7.2 G/DL (ref 11.7–15.7)
HGB BLD-MCNC: 7.3 G/DL (ref 11.7–15.7)
HGB BLD-MCNC: 7.4 G/DL (ref 11.7–15.7)
HGB BLD-MCNC: 7.5 G/DL (ref 11.7–15.7)
HGB BLD-MCNC: 7.7 G/DL (ref 11.7–15.7)
HGB BLD-MCNC: 8 G/DL (ref 11.7–15.7)
HGB BLD-MCNC: 8.2 G/DL (ref 11.7–15.7)
HGB BLD-MCNC: 8.2 G/DL (ref 11.7–15.7)
HGB BLD-MCNC: 8.6 G/DL (ref 11.7–15.7)
HGB BLD-MCNC: 9.6 G/DL (ref 11.7–15.7)
HGB UR QL STRIP: ABNORMAL
IMM GRANULOCYTES # BLD: 0 10E9/L (ref 0–0.4)
IMM GRANULOCYTES # BLD: 0.1 10E9/L (ref 0–0.4)
IMM GRANULOCYTES NFR BLD: 0.2 %
IMM GRANULOCYTES NFR BLD: 0.2 %
IMM GRANULOCYTES NFR BLD: 0.4 %
IMM GRANULOCYTES NFR BLD: 0.5 %
IMM GRANULOCYTES NFR BLD: 0.9 %
KETONES UR STRIP-MCNC: NEGATIVE MG/DL
LAB SCANNED RESULT: ABNORMAL
LEUKOCYTE ESTERASE UR QL STRIP: ABNORMAL
LYMPHOCYTES # BLD AUTO: 1 10E9/L (ref 0.8–5.3)
LYMPHOCYTES # BLD AUTO: 1.1 10E9/L (ref 0.8–5.3)
LYMPHOCYTES # BLD AUTO: 1.2 10E9/L (ref 0.8–5.3)
LYMPHOCYTES # BLD AUTO: 1.3 10E9/L (ref 0.8–5.3)
LYMPHOCYTES # BLD AUTO: 1.9 10E9/L (ref 0.8–5.3)
LYMPHOCYTES NFR BLD AUTO: 15 %
LYMPHOCYTES NFR BLD AUTO: 17 %
LYMPHOCYTES NFR BLD AUTO: 20.9 %
LYMPHOCYTES NFR BLD AUTO: 21.6 %
LYMPHOCYTES NFR BLD AUTO: 22.2 %
LYMPHOCYTES NFR BLD AUTO: 22.4 %
LYMPHOCYTES NFR BLD AUTO: 26.1 %
Lab: ABNORMAL
MCH RBC QN AUTO: 28.9 PG (ref 26.5–33)
MCH RBC QN AUTO: 28.9 PG (ref 26.5–33)
MCH RBC QN AUTO: 29.1 PG (ref 26.5–33)
MCH RBC QN AUTO: 29.3 PG (ref 26.5–33)
MCH RBC QN AUTO: 29.4 PG (ref 26.5–33)
MCH RBC QN AUTO: 29.4 PG (ref 26.5–33)
MCH RBC QN AUTO: 29.6 PG (ref 26.5–33)
MCH RBC QN AUTO: 29.6 PG (ref 26.5–33)
MCH RBC QN AUTO: 30 PG (ref 26.5–33)
MCH RBC QN AUTO: 30.1 PG (ref 26.5–33)
MCHC RBC AUTO-ENTMCNC: 29.5 G/DL (ref 31.5–36.5)
MCHC RBC AUTO-ENTMCNC: 30 G/DL (ref 31.5–36.5)
MCHC RBC AUTO-ENTMCNC: 30.1 G/DL (ref 31.5–36.5)
MCHC RBC AUTO-ENTMCNC: 30.4 G/DL (ref 31.5–36.5)
MCHC RBC AUTO-ENTMCNC: 30.4 G/DL (ref 31.5–36.5)
MCHC RBC AUTO-ENTMCNC: 30.6 G/DL (ref 31.5–36.5)
MCHC RBC AUTO-ENTMCNC: 31.1 G/DL (ref 31.5–36.5)
MCHC RBC AUTO-ENTMCNC: 31.3 G/DL (ref 31.5–36.5)
MCHC RBC AUTO-ENTMCNC: 31.4 G/DL (ref 31.5–36.5)
MCHC RBC AUTO-ENTMCNC: 31.4 G/DL (ref 31.5–36.5)
MCV RBC AUTO: 100 FL (ref 78–100)
MCV RBC AUTO: 92 FL (ref 78–100)
MCV RBC AUTO: 95 FL (ref 78–100)
MCV RBC AUTO: 95 FL (ref 78–100)
MCV RBC AUTO: 96 FL (ref 78–100)
MCV RBC AUTO: 97 FL (ref 78–100)
MCV RBC AUTO: 98 FL (ref 78–100)
MONOCYTES # BLD AUTO: 0.3 10E9/L (ref 0–1.3)
MONOCYTES # BLD AUTO: 0.3 10E9/L (ref 0–1.3)
MONOCYTES # BLD AUTO: 0.4 10E9/L (ref 0–1.3)
MONOCYTES # BLD AUTO: 0.4 10E9/L (ref 0–1.3)
MONOCYTES # BLD AUTO: 0.5 10E9/L (ref 0–1.3)
MONOCYTES # BLD AUTO: 0.6 10E9/L (ref 0–1.3)
MONOCYTES # BLD AUTO: 0.6 10E9/L (ref 0–1.3)
MONOCYTES NFR BLD AUTO: 10 %
MONOCYTES NFR BLD AUTO: 4.9 %
MONOCYTES NFR BLD AUTO: 5 %
MONOCYTES NFR BLD AUTO: 6.4 %
MONOCYTES NFR BLD AUTO: 7.6 %
MONOCYTES NFR BLD AUTO: 8.1 %
MONOCYTES NFR BLD AUTO: 8.5 %
NEUTROPHILS # BLD AUTO: 3.5 10E9/L (ref 1.6–8.3)
NEUTROPHILS # BLD AUTO: 3.6 10E9/L (ref 1.6–8.3)
NEUTROPHILS # BLD AUTO: 3.9 10E9/L (ref 1.6–8.3)
NEUTROPHILS # BLD AUTO: 4.1 10E9/L (ref 1.6–8.3)
NEUTROPHILS # BLD AUTO: 4.2 10E9/L (ref 1.6–8.3)
NEUTROPHILS # BLD AUTO: 4.7 10E9/L (ref 1.6–8.3)
NEUTROPHILS # BLD AUTO: 5.4 10E9/L (ref 1.6–8.3)
NEUTROPHILS NFR BLD AUTO: 59.8 %
NEUTROPHILS NFR BLD AUTO: 63.8 %
NEUTROPHILS NFR BLD AUTO: 66.3 %
NEUTROPHILS NFR BLD AUTO: 68.2 %
NEUTROPHILS NFR BLD AUTO: 70.3 %
NEUTROPHILS NFR BLD AUTO: 73.9 %
NEUTROPHILS NFR BLD AUTO: 77.2 %
NITRATE UR QL: NEGATIVE
NRBC # BLD AUTO: 0 10*3/UL
NRBC BLD AUTO-RTO: 0 /100
PH UR STRIP: 7.5 PH (ref 5–7)
PLATELET # BLD AUTO: 121 10E9/L (ref 150–450)
PLATELET # BLD AUTO: 129 10E9/L (ref 150–450)
PLATELET # BLD AUTO: 138 10E9/L (ref 150–450)
PLATELET # BLD AUTO: 142 10E9/L (ref 150–450)
PLATELET # BLD AUTO: 145 10E9/L (ref 150–450)
PLATELET # BLD AUTO: 146 10E9/L (ref 150–450)
PLATELET # BLD AUTO: 147 10E9/L (ref 150–450)
PLATELET # BLD AUTO: 152 10E9/L (ref 150–450)
PLATELET # BLD AUTO: 160 10E9/L (ref 150–450)
PLATELET # BLD AUTO: 169 10E9/L (ref 150–450)
POTASSIUM SERPL-SCNC: 4.7 MMOL/L (ref 3.4–5.3)
POTASSIUM SERPL-SCNC: 4.8 MMOL/L (ref 3.4–5.3)
POTASSIUM SERPL-SCNC: 5.1 MMOL/L (ref 3.4–5.3)
POTASSIUM SERPL-SCNC: 5.2 MMOL/L (ref 3.4–5.3)
POTASSIUM SERPL-SCNC: 5.3 MMOL/L (ref 3.4–5.3)
POTASSIUM SERPL-SCNC: 5.4 MMOL/L (ref 3.4–5.3)
POTASSIUM SERPL-SCNC: 5.5 MMOL/L (ref 3.4–5.3)
POTASSIUM SERPL-SCNC: 5.6 MMOL/L (ref 3.4–5.3)
POTASSIUM SERPL-SCNC: 5.6 MMOL/L (ref 3.4–5.3)
POTASSIUM SERPL-SCNC: 6.3 MMOL/L (ref 3.4–5.3)
PROCALCITONIN SERPL-MCNC: 0.08 NG/ML
PROT SERPL-MCNC: 6.1 G/DL (ref 6.8–8.8)
PROT SERPL-MCNC: 6.1 G/DL (ref 6.8–8.8)
PROT SERPL-MCNC: 6.3 G/DL (ref 6.8–8.8)
PROT SERPL-MCNC: 6.5 G/DL (ref 6.8–8.8)
PROT SERPL-MCNC: 6.7 G/DL (ref 6.8–8.8)
PROT SERPL-MCNC: 6.8 G/DL (ref 6.8–8.8)
PROT SERPL-MCNC: 6.9 G/DL (ref 6.8–8.8)
PROT SERPL-MCNC: 7.1 G/DL (ref 6.8–8.8)
PROT SERPL-MCNC: 7.2 G/DL (ref 6.8–8.8)
RBC # BLD AUTO: 2.45 10E12/L (ref 3.8–5.2)
RBC # BLD AUTO: 2.46 10E12/L (ref 3.8–5.2)
RBC # BLD AUTO: 2.49 10E12/L (ref 3.8–5.2)
RBC # BLD AUTO: 2.53 10E12/L (ref 3.8–5.2)
RBC # BLD AUTO: 2.66 10E12/L (ref 3.8–5.2)
RBC # BLD AUTO: 2.67 10E12/L (ref 3.8–5.2)
RBC # BLD AUTO: 2.82 10E12/L (ref 3.8–5.2)
RBC # BLD AUTO: 2.84 10E12/L (ref 3.8–5.2)
RBC # BLD AUTO: 2.93 10E12/L (ref 3.8–5.2)
RBC # BLD AUTO: 3.24 10E12/L (ref 3.8–5.2)
RBC #/AREA URNS AUTO: 2 /HPF (ref 0–2)
SODIUM SERPL-SCNC: 136 MMOL/L (ref 133–144)
SODIUM SERPL-SCNC: 137 MMOL/L (ref 133–144)
SODIUM SERPL-SCNC: 138 MMOL/L (ref 133–144)
SODIUM SERPL-SCNC: 139 MMOL/L (ref 133–144)
SODIUM SERPL-SCNC: 140 MMOL/L (ref 133–144)
SODIUM SERPL-SCNC: 141 MMOL/L (ref 133–144)
SOURCE: ABNORMAL
SP GR UR STRIP: 1.01 (ref 1–1.03)
SPECIMEN SOURCE: ABNORMAL
SQUAMOUS #/AREA URNS AUTO: 1 /HPF (ref 0–1)
TACROLIMUS BLD-MCNC: 3.2 UG/L (ref 5–15)
TACROLIMUS BLD-MCNC: 3.3 UG/L (ref 5–15)
TACROLIMUS BLD-MCNC: 3.8 UG/L (ref 5–15)
TACROLIMUS BLD-MCNC: 3.9 UG/L (ref 5–15)
TACROLIMUS BLD-MCNC: 4.1 UG/L (ref 5–15)
TACROLIMUS BLD-MCNC: 5.4 UG/L (ref 5–15)
TACROLIMUS BLD-MCNC: 6 UG/L (ref 5–15)
TACROLIMUS BLD-MCNC: 7 UG/L (ref 5–15)
TACROLIMUS BLD-MCNC: 9.1 UG/L (ref 5–15)
TME LAST DOSE: 0 H
TME LAST DOSE: ABNORMAL H
TME LAST DOSE: NORMAL H
UROBILINOGEN UR STRIP-MCNC: NORMAL MG/DL (ref 0–2)
WBC # BLD AUTO: 5.3 10E9/L (ref 4–11)
WBC # BLD AUTO: 5.6 10E9/L (ref 4–11)
WBC # BLD AUTO: 5.6 10E9/L (ref 4–11)
WBC # BLD AUTO: 5.8 10E9/L (ref 4–11)
WBC # BLD AUTO: 6.1 10E9/L (ref 4–11)
WBC # BLD AUTO: 6.4 10E9/L (ref 4–11)
WBC # BLD AUTO: 6.8 10E9/L (ref 4–11)
WBC # BLD AUTO: 7 10E9/L (ref 4–11)
WBC # BLD AUTO: 7.1 10E9/L (ref 4–11)
WBC # BLD AUTO: 7.6 10E9/L (ref 4–11)
WBC #/AREA URNS AUTO: 13 /HPF (ref 0–5)

## 2020-01-01 PROCEDURE — 99607 MTMS BY PHARM ADDL 15 MIN: CPT | Performed by: PHARMACIST

## 2020-01-01 PROCEDURE — 80053 COMPREHEN METABOLIC PANEL: CPT | Performed by: INTERNAL MEDICINE

## 2020-01-01 PROCEDURE — 85025 COMPLETE CBC W/AUTO DIFF WBC: CPT | Performed by: INTERNAL MEDICINE

## 2020-01-01 PROCEDURE — 99498 ADVNCD CARE PLAN ADDL 30 MIN: CPT | Performed by: NURSE PRACTITIONER

## 2020-01-01 PROCEDURE — 80053 COMPREHEN METABOLIC PANEL: CPT | Performed by: FAMILY MEDICINE

## 2020-01-01 PROCEDURE — 85027 COMPLETE CBC AUTOMATED: CPT | Performed by: INTERNAL MEDICINE

## 2020-01-01 PROCEDURE — 87088 URINE BACTERIA CULTURE: CPT | Performed by: FAMILY MEDICINE

## 2020-01-01 PROCEDURE — 80197 ASSAY OF TACROLIMUS: CPT | Performed by: INTERNAL MEDICINE

## 2020-01-01 PROCEDURE — 81001 URINALYSIS AUTO W/SCOPE: CPT | Performed by: FAMILY MEDICINE

## 2020-01-01 PROCEDURE — 80048 BASIC METABOLIC PNL TOTAL CA: CPT | Performed by: INTERNAL MEDICINE

## 2020-01-01 PROCEDURE — 99443: CPT | Performed by: NURSE PRACTITIONER

## 2020-01-01 PROCEDURE — 99605 MTMS BY PHARM NP 15 MIN: CPT | Performed by: PHARMACIST

## 2020-01-01 PROCEDURE — G0179 MD RECERTIFICATION HHA PT: HCPCS | Performed by: NURSE PRACTITIONER

## 2020-01-01 PROCEDURE — 99606 MTMS BY PHARM EST 15 MIN: CPT | Performed by: PHARMACIST

## 2020-01-01 PROCEDURE — 82306 VITAMIN D 25 HYDROXY: CPT | Performed by: INTERNAL MEDICINE

## 2020-01-01 PROCEDURE — 85025 COMPLETE CBC W/AUTO DIFF WBC: CPT | Performed by: FAMILY MEDICINE

## 2020-01-01 PROCEDURE — 99350 HOME/RES VST EST HIGH MDM 60: CPT | Performed by: NURSE PRACTITIONER

## 2020-01-01 PROCEDURE — 87186 SC STD MICRODIL/AGAR DIL: CPT | Performed by: FAMILY MEDICINE

## 2020-01-01 PROCEDURE — 87106 FUNGI IDENTIFICATION YEAST: CPT | Performed by: FAMILY MEDICINE

## 2020-01-01 PROCEDURE — 99606 MTMS BY PHARM EST 15 MIN: CPT | Mod: GT | Performed by: PHARMACIST

## 2020-01-01 PROCEDURE — 99607 MTMS BY PHARM ADDL 15 MIN: CPT | Mod: GT | Performed by: PHARMACIST

## 2020-01-01 PROCEDURE — 99215 OFFICE O/P EST HI 40 MIN: CPT | Mod: 95 | Performed by: NURSE PRACTITIONER

## 2020-01-01 PROCEDURE — G2012 BRIEF CHECK IN BY MD/QHP: HCPCS | Performed by: NURSE PRACTITIONER

## 2020-01-01 PROCEDURE — 80048 BASIC METABOLIC PNL TOTAL CA: CPT | Performed by: FAMILY MEDICINE

## 2020-01-01 PROCEDURE — G0179 MD RECERTIFICATION HHA PT: HCPCS | Performed by: FAMILY MEDICINE

## 2020-01-01 PROCEDURE — 99497 ADVNCD CARE PLAN 30 MIN: CPT | Performed by: NURSE PRACTITIONER

## 2020-01-01 PROCEDURE — 99423 OL DIG E/M SVC 21+ MIN: CPT | Performed by: NURSE PRACTITIONER

## 2020-01-01 PROCEDURE — 87086 URINE CULTURE/COLONY COUNT: CPT | Performed by: FAMILY MEDICINE

## 2020-01-01 PROCEDURE — 84145 PROCALCITONIN (PCT): CPT | Performed by: FAMILY MEDICINE

## 2020-01-01 PROCEDURE — 99349 HOME/RES VST EST MOD MDM 40: CPT | Performed by: NURSE PRACTITIONER

## 2020-01-01 PROCEDURE — 82610 CYSTATIN C: CPT | Performed by: FAMILY MEDICINE

## 2020-01-01 PROCEDURE — 99443 ZZC PHYSICIAN TELEPHONE EVALUATION 21-30 MIN: CPT | Performed by: NURSE PRACTITIONER

## 2020-01-01 RX ORDER — GABAPENTIN 300 MG/1
CAPSULE ORAL
Qty: 60 CAPSULE | Refills: 1 | Status: SHIPPED | OUTPATIENT
Start: 2020-01-01 | End: 2020-01-01

## 2020-01-01 RX ORDER — TACROLIMUS 1 MG/1
2 CAPSULE ORAL 2 TIMES DAILY
Qty: 120 CAPSULE | Refills: 11 | Status: SHIPPED | OUTPATIENT
Start: 2020-01-01 | End: 2020-01-01

## 2020-01-01 RX ORDER — GABAPENTIN 100 MG/1
100 CAPSULE ORAL DAILY PRN
Qty: 30 CAPSULE | Refills: 3
Start: 2020-01-01 | End: 2020-01-01

## 2020-01-01 RX ORDER — FAMOTIDINE 20 MG/1
20 TABLET, FILM COATED ORAL 2 TIMES DAILY
Qty: 60 TABLET | Refills: 1 | Status: SHIPPED | OUTPATIENT
Start: 2020-01-01 | End: 2020-01-01

## 2020-01-01 RX ORDER — LEVOTHYROXINE SODIUM 50 UG/1
50 TABLET ORAL DAILY
Qty: 90 TABLET | Refills: 0 | Status: CANCELLED | OUTPATIENT
Start: 2020-01-01

## 2020-01-01 RX ORDER — SODIUM BICARBONATE 650 MG/1
TABLET ORAL
Qty: 120 TABLET | Refills: 0 | Status: SHIPPED | OUTPATIENT
Start: 2020-01-01 | End: 2020-01-01

## 2020-01-01 RX ORDER — CYANOCOBALAMIN 1000 UG/ML
1000 INJECTION, SOLUTION INTRAMUSCULAR; SUBCUTANEOUS
Qty: 3 ML | Refills: 1 | Status: SHIPPED | OUTPATIENT
Start: 2020-01-01

## 2020-01-01 RX ORDER — AMLODIPINE BESYLATE 5 MG/1
TABLET ORAL
Qty: 90 TABLET | Refills: 0 | Status: SHIPPED | OUTPATIENT
Start: 2020-01-01 | End: 2020-01-01

## 2020-01-01 RX ORDER — LEVOTHYROXINE SODIUM 50 UG/1
TABLET ORAL
Qty: 90 TABLET | Refills: 0 | Status: SHIPPED | OUTPATIENT
Start: 2020-01-01

## 2020-01-01 RX ORDER — GABAPENTIN 100 MG/1
100 CAPSULE ORAL 3 TIMES DAILY
Qty: 90 CAPSULE | Refills: 3 | Status: SHIPPED | OUTPATIENT
Start: 2020-01-01 | End: 2020-01-01

## 2020-01-01 RX ORDER — ONDANSETRON 4 MG/1
4 TABLET, ORALLY DISINTEGRATING ORAL DAILY PRN
Qty: 20 TABLET | Refills: 1 | Status: SHIPPED
Start: 2020-01-01

## 2020-01-01 RX ORDER — TACROLIMUS 1 MG/1
CAPSULE ORAL
Qty: 90 CAPSULE | Refills: 11 | Status: SHIPPED | OUTPATIENT
Start: 2020-01-01 | End: 2020-01-01

## 2020-01-01 RX ORDER — LEVOTHYROXINE SODIUM 50 UG/1
TABLET ORAL
Qty: 90 TABLET | Refills: 0 | Status: SHIPPED | OUTPATIENT
Start: 2020-01-01 | End: 2020-01-01

## 2020-01-01 RX ORDER — SODIUM BICARBONATE 650 MG/1
TABLET ORAL
Qty: 120 TABLET | Refills: 3 | Status: SHIPPED | OUTPATIENT
Start: 2020-01-01

## 2020-01-01 RX ORDER — DIPHENOXYLATE HCL/ATROPINE 2.5-.025MG
1-2 TABLET ORAL 4 TIMES DAILY PRN
Qty: 180 TABLET | Refills: 1 | Status: SHIPPED | OUTPATIENT
Start: 2020-01-01

## 2020-01-01 RX ORDER — PRAVASTATIN SODIUM 10 MG
10 TABLET ORAL DAILY
Qty: 90 TABLET | Refills: 3 | Status: SHIPPED | OUTPATIENT
Start: 2020-01-01

## 2020-01-01 RX ORDER — GABAPENTIN 300 MG/1
300 CAPSULE ORAL 2 TIMES DAILY
Qty: 60 CAPSULE | Refills: 3
Start: 2020-01-01 | End: 2020-01-01

## 2020-01-01 RX ORDER — LABETALOL 300 MG/1
300 TABLET, FILM COATED ORAL 2 TIMES DAILY
Qty: 180 TABLET | Refills: 0 | Status: SHIPPED | OUTPATIENT
Start: 2020-01-01

## 2020-01-01 RX ORDER — SODIUM BICARBONATE 650 MG/1
TABLET ORAL
Qty: 30 TABLET | Refills: 3 | Status: SHIPPED | OUTPATIENT
Start: 2020-01-01 | End: 2020-01-01

## 2020-01-01 RX ORDER — AMLODIPINE BESYLATE 5 MG/1
TABLET ORAL
Qty: 90 TABLET | Refills: 0 | Status: SHIPPED | OUTPATIENT
Start: 2020-01-01

## 2020-01-01 RX ORDER — OLANZAPINE 2.5 MG/1
2.5 TABLET, FILM COATED ORAL 2 TIMES DAILY
Qty: 60 TABLET | Refills: 0 | Status: SHIPPED | OUTPATIENT
Start: 2020-01-01 | End: 2020-01-01

## 2020-01-01 RX ORDER — AMOXICILLIN 250 MG/1
250 CAPSULE ORAL 2 TIMES DAILY
Qty: 20 CAPSULE | Refills: 0 | Status: SHIPPED | OUTPATIENT
Start: 2020-01-01 | End: 2020-01-01

## 2020-01-01 RX ORDER — PREDNISONE 5 MG/1
5 TABLET ORAL DAILY
Qty: 30 TABLET | Refills: 7 | Status: SHIPPED | OUTPATIENT
Start: 2020-01-01

## 2020-01-01 RX ORDER — OLANZAPINE 5 MG/1
2.5 TABLET ORAL 2 TIMES DAILY
Qty: 30 TABLET | Refills: 3 | Status: SHIPPED | OUTPATIENT
Start: 2020-01-01

## 2020-01-01 RX ORDER — TACROLIMUS 1 MG/1
CAPSULE ORAL
Qty: 150 CAPSULE | Refills: 11 | Status: SHIPPED | OUTPATIENT
Start: 2020-01-01 | End: 2020-01-01

## 2020-01-01 RX ORDER — FUROSEMIDE 40 MG
40 TABLET ORAL DAILY
Qty: 1 TABLET | Refills: 0 | Status: SHIPPED | OUTPATIENT
Start: 2020-01-01 | End: 2020-01-01

## 2020-01-01 RX ORDER — TACROLIMUS 1 MG/1
2 CAPSULE ORAL 2 TIMES DAILY
Qty: 120 CAPSULE | Refills: 11 | Status: SHIPPED | OUTPATIENT
Start: 2020-01-01

## 2020-01-01 RX ORDER — LORATADINE 10 MG/1
10 TABLET ORAL DAILY
Qty: 90 TABLET | Refills: 3 | Status: SHIPPED | OUTPATIENT
Start: 2020-01-01

## 2020-01-01 RX ORDER — PRAZOSIN HYDROCHLORIDE 1 MG/1
2 CAPSULE ORAL AT BEDTIME
Qty: 180 CAPSULE | Refills: 1 | Status: SHIPPED | OUTPATIENT
Start: 2020-01-01

## 2020-01-06 NOTE — PROGRESS NOTES
SUBJECTIVE:                                                    Gem Jade is a 74 year old female who is seen in the home today due to bedridden status, unable to stand, struggles to be up in WC for any length of time due to vertigo, seen for the following health issues:    Diabetes Follow-up    How often are you checking your blood sugar? Two times daily  Blood sugar testing frequency justification:  Uncontrolled diabetes and Adjustment of medication(s)  What time of day are you checking your blood sugars (select all that apply)?  Before meals and At bedtime  Have you had any blood sugars above 200?  Yes occ. Depends on diet   Have you had any blood sugars below 70?  No    What symptoms do you notice when your blood sugar is low?  Shaky, Weak and Confusion    What concerns do you have today about your diabetes? None and Getting infections often     Do you have any of these symptoms? (Select all that apply)  Burning in feet, but has improved with splitting gabapentin into 2 doses 300mg 2x daily rarely taking the extra 100mg      Have you had a diabetic eye exam in the last 12 months? No    BP Readings from Last 2 Encounters:   11/26/19 113/56   11/19/19 (!) (P) 149/69     Hemoglobin A1C (%)   Date Value   11/01/2019 5.8 (H)   08/07/2019 6.8 (H)     LDL Cholesterol Calculated (mg/dL)   Date Value   12/05/2016 64   02/05/2015 18       Diabetes Management Resources    Chronic Kidney Disease Follow-up    Long discussion today about decline in kidney function, increasing creatinine and decline in GFR, discussed options, reviewed POLST and health care directives, discussed dialysis      Do you take any over the counter pain medicine?: No      How many servings of fruits and vegetables do you eat daily?  2-3    On average, how many sweetened beverages do you drink each day (Examples: soda, juice, sweet tea, etc.  Do NOT count diet or artificially sweetened beverages)?   0  How many days per week do you miss taking  your medication? 1    What makes it hard for you to take your medications?  remembering to take      Mental Health Follow up depression/anxiety     Status since last visit: improved     See PHQ-9 for current symptoms.  Other associated symptoms: None    Complicating factors: decline in kidney function   Significant life event:  No   Current substance abuse:  None  Anxiety or Panic symptoms:  No    Sleep - improved with prazosin, taking 2 at bedtime no more nightmares     Appetite - baseline, eating 2 meals a day most days   Exercise - none, bed ridden     Smoking - no  Alcohol - no  Street drugs - no  Marijuana - no  Caffeine - occ    PHQ-9  English PHQ-9   Any Language               Problems taking medications regularly: Yes,  problems remembering to take    Medication side effects: none    Diet: diabetic    Social History     Tobacco Use     Smoking status: Former Smoker     Years: 17.00     Last attempt to quit: 1987     Years since quittin.6     Smokeless tobacco: Never Used   Substance Use Topics     Alcohol use: No        Problem list and histories reviewed & adjusted, as indicated.  Additional history: as documented    Patient Active Problem List   Diagnosis     Kidney replaced by transplant     Primary localized osteoarthrosis, lower leg     Pulmonary embolism and infarction (H)     Diabetes mellitus with background retinopathy (H)     Hyperlipidemia LDL goal <100     Diabetic gastroparesis (H)     Back pain     Compression fx, thoracic spine (H)     Anemia     Clostridium difficile diarrhea     Hydronephrosis     Recurrent UTI     Benign essential hypertension     Vancomycin resistant Enterococcus     Clostridium difficile colitis     Diabetic polyneuropathy associated with type 2 diabetes mellitus (H)     Chronic shoulder pain, unspecified laterality     Morbid obesity, unspecified obesity type (H)     Health Care Home     Paroxysmal A-fib (H)     Acute kidney injury (H)     Pancytopenia, acquired  (H)     Obstructive sleep apnea syndrome     Complicated UTI (urinary tract infection)     Altered Mental Status, Probable Metabolic encephalopathy     Chronic indwelling Johnson catheter     Seizure disorder (H)     Dermatitis     Encephalopathy acute     Type 2 diabetes mellitus with diabetic polyneuropathy, with long-term current use of insulin (H)     Sepsis (H)     Overdose, accidental or unintentional, subsequent encounter     CKD (chronic kidney disease) stage 3, GFR 30-59 ml/min (H)     Paranoia (H)     Altered mental status     UTI (urinary tract infection)     Past Surgical History:   Procedure Laterality Date     AMPUTATE TOE(S) Right 2019    Procedure: RIGHT FIRST TOE AMPUTATION;  Surgeon: Armen Jefferson DPM;  Location: [facility] OR     C NONSPECIFIC PROCEDURE  10/99    kidney transplant     C NONSPECIFIC PROCEDURE      MRI head, lumbar spine, pelvis     C TOTAL KNEE ARTHROPLASTY  3/03    Knee Replacement, Total right, post op PE     CHOLECYSTECTOMY       HC LEFT HEART CATHETERIZATION      Cardiac Cath, Left Heart, Negative  (@ [facility])     HC LEFT HEART CATHETERIZATION      normal coronary angiogram at [facility], had mild troponin rise (0.71)     HC REMV CATARACT EXTRACAP,INSERT LENS      bilateral cataract repaired, left eye      IR PICC PLACEMENT > 5 YRS OF AGE  2019       Social History     Tobacco Use     Smoking status: Former Smoker     Years: 17.00     Last attempt to quit: 1987     Years since quittin.6     Smokeless tobacco: Never Used   Substance Use Topics     Alcohol use: No     Family History   Problem Relation Age of Onset     Diabetes Mother      Parkinsonism Mother      Diabetes Brother      Hypertension Father         [id number] pneumonia     Heart Disease Father            Current Outpatient Medications   Medication Sig Dispense Refill     acetaminophen (TYLENOL) 325 MG tablet Take 325-650 mg by mouth every 4 hours as needed        amLODIPine (NORVASC) 5 MG  tablet Take 1 tablet (5 mg) by mouth daily 90 tablet 0     aspirin 81 MG tablet Take 81 mg by mouth daily       baclofen (LIORESAL) 10 MG tablet 10mg 2x a month with cath changes 60 tablet 3     blood glucose (NO BRAND SPECIFIED) lancets standard Use to test blood sugar 3 times daily or as directed. (Accuchek guide) 300 each 3     blood glucose (NO BRAND SPECIFIED) test strip Use to test blood sugar 3 times daily or as directed. (accuchek guide) 300 strip 3     calcium carbonate-vitamin D (OSCAL W/D) 500-200 MG-UNIT tablet Take 1 tablet by mouth 3 times daily 90 tablet 0     COMFORT EZ PEN NEEDLES 31G X 6 MM miscellaneous USE 4 DAILY OR AS DIRECTED 100 each 3     CRANBERRY PO Take 1 tablet by mouth 2 times daily        cyanocobalamin (CYANOCOBALAMIN) 1000 MCG/ML injection Inject 1 mL (1,000 mcg) into the muscle every 2 months 3 mL 3     diphenoxylate-atropine (LOMOTIL) 2.5-0.025 MG tablet Take 1-2 tablets by mouth 4 times daily as needed for diarrhea 360 tablet 0     famotidine (PEPCID) 20 MG tablet Take 1 tablet (20 mg) by mouth 2 times daily 60 tablet 1     gabapentin (NEURONTIN) 300 MG capsule Take 1 capsule (300 mg) by mouth 2 times daily 60 capsule 1     gentamicin irrigation Irrigate rico cath 3x a week  60ml of 400mg of gentamicinin [id number] of 0.9% Sodium Chloride  On hold until done with current antibiotics 1 Bottle 1     insulin aspart (NOVOLOG FLEXPEN) 100 UNIT/ML pen Take 10 units with meals + sliding scale if eating. Sliding scale only at bedtime when not eating. Max 25 units per day. Hold if pre-meal glucose is <90  Sliding scale:   140-199 2u  200-249 4u  250-299 6u  300-349 8u  350-399 10u  Over 400 12u 45 mL 3     insulin glargine (LANTUS SOLOSTAR) 100 UNIT/ML pen Inject 20 Units Subcutaneous At Bedtime 30 mL 3     labetalol (NORMODYNE) 300 MG tablet Take 1 tablet (300 mg) by mouth 2 times daily 180 tablet 0     lactobacillus rhamnosus, GG, (CULTURELL) capsule Take 1 capsule by mouth 3 times daily  "60 capsule 11     levothyroxine (SYNTHROID/LEVOTHROID) 50 MCG tablet TAKE ONE TABLET BY MOUTH EVERY DAY 90 tablet 0     lidocaine (XYLOCAINE) 2 % external gel Apply topically 3 times daily (Patient taking differently: Apply topically 3 times daily as needed ) 85 mL 11     loratadine (CLARITIN) 10 MG tablet Take 1 tablet (10 mg) by mouth daily 90 tablet 3     menthol-zinc oxide (CALMOSEPTINE) 0.44-20.625 % OINT ointment Apply topically 4 times daily as needed for skin protection       ondansetron (ZOFRAN ODT) 4 MG ODT tab Take 1 tablet (4 mg) by mouth daily as needed for nausea 30 minutes before getting up in your wheelchair 20 tablet 1     order for DME Equipment being ordered: [employer] Mobility System, Titan 500, free standing overhead patient lift    Please send to  DME 1 Device 0     order for DME Equipment being ordered: CPAP mask, [employer] FX, nasal pillows system, women's,   Catalog # [id number]    Please send to  DME 1 Device 3     order for DME Equipment being ordered: Tegaderm 4x4   MICHELLE 6 months  Sacral wound 1 Box 3     order for DME Equipment being ordered: [employer] gel-T cushion 20 inches wide & 18 inches deep    height 1.676 m (5' 6\"), weight 101.6 kg (223 lb 15.8 oz) 1 each 0     pravastatin (PRAVACHOL) 10 MG tablet Take 1 tablet (10 mg) by mouth daily 90 tablet 3     prazosin (MINIPRESS) 1 MG capsule Take 2 capsules (2 mg) by mouth At Bedtime 180 capsule 1     predniSONE (DELTASONE) 5 MG tablet Take 1 tablet (5 mg) by mouth daily 90 tablet 3     simethicone (MYLICON) 125 MG CHEW Take 1 tablet (125 mg) by mouth as needed for intestinal gas 120 tablet      sodium bicarbonate 650 MG tablet Take 2 tablets (1,300 mg) by mouth 2 times daily 30 tablet 0     tacrolimus (GENERIC EQUIVALENT) 1 MG capsule Take 2 capsules (2 mg) by mouth 2 times daily Appointment required for refills. 120 capsule 0     vitamin D3 (CHOLECALCIFEROL) 2000 units (50 mcg) tablet Take 2,000 Units by mouth 2 times daily    "     Allergies   Allergen Reactions     Atorvastatin Calcium      myalgias, muscle aches     Codeine Nausea and Vomiting     Darvocet [Propoxyphene N-Apap] Nausea and Vomiting     Hydromorphone      Levofloxacin Other (See Comments) and Diarrhea     hallucinations     Morphine      Nausea and vomiting     Niaspan [Niacin] GI Disturbance     Flushing even at low dose, GI upset     Percocet [Oxycodone-Acetaminophen]      Vomiting after taking med couple of times     Vicodin [Hydrocodone-Acetaminophen] Nausea and Vomiting     Recent Labs   Lab Test 12/10/19  1213 11/26/19  0526  11/23/19  0545  11/19/19  1235  11/01/19  0920 10/31/19  1404  08/07/19  0855  07/01/19  1208  06/03/19  0730  04/24/19  1258  12/05/16  1210  02/05/15  1250  05/16/13  1240   A1C  --   --   --   --   --   --   --  5.8*  --   --  6.8*  --   --   --   --   --  6.2*   < > 7.4*   < > 6.9*   < > 7.2*   LDL  --   --   --   --   --   --   --   --   --   --   --   --   --   --   --   --   --   --  64  --  18  --  149*   HDL  --   --   --   --   --   --   --   --   --   --   --   --   --   --   --   --   --   --  46*  --  43*  --  42*   TRIG  --   --   --   --   --   --   --   --   --   --   --   --   --   --   --   --   --   --  272*  --  223*  --  262*   ALT  --   --   --  <6  --  <6  --   --  16  --   --    < >  --    < >  --    < > 22   < >  --    < >  --    < >  --    CR 2.90* 3.33*   < > 3.30*   < > 3.88*   < > 2.50* 2.69*   < > 2.07*   < > 1.68*   < >  --    < > 1.78*   < >  --    < > 0.67   < > 0.70   GFRESTIMATED 15* 13*   < > 13*   < > 11*   < > 18* 17*   < > 23*   < > 30*   < >  --    < > 28*   < >  --    < > 87   < > 84   GFRESTBLACK 18* 15*   < > 15*   < > 13*   < > 21* 20*   < > 27*   < > 35*   < >  --    < > 32*   < >  --    < > >90   GFR Calc     < > >90   POTASSIUM 5.3 4.2   < > 3.9   < > 5.3   < > 4.1 4.6   < > 4.5   < > 4.5   < >  --    < > 4.5   < >  --    < > 4.3   < > 3.9   TSH  --   --   --   --   --   --   --   --    --   --   --   --  2.43  --  1.28  --   --    < >  --    < >  --    < >  --     < > = values in this interval not displayed.      BP Readings from Last 3 Encounters:   11/26/19 113/56   11/19/19 (!) (P) 149/69   11/15/19 (!) 141/30    Wt Readings from Last 3 Encounters:   11/26/19 108.6 kg (239 lb 8 oz)   11/01/19 103 kg (227 lb 1.2 oz)   08/28/19 103.9 kg (229 lb)          Labs reviewed in EPIC  Problem list, Medication list, Allergies, and Medical/Social/Surgical histories reviewed in [facility] and updated as appropriate.      ROS: Constitutional, neuro, ENT, endocrine, pulmonary, cardiac, gastrointestinal, genitourinary, musculoskeletal, integument and psychiatric systems are negative, except as otherwise noted above in the HPI.    OBJECTIVE:                                                    Pulse 69   SpO2 97%   There is no height or weight on file to calculate BMI.    GENERAL: healthy, alert and no distress  ABDOMEN: soft, nontender, no hepatosplenomegaly, no masses and bowel sounds normal  MS: no gross musculoskeletal defects noted, no edema  SKIN: no suspicious lesions or rashes    Mental Status Assessment:  Appearance:   Appropriate   Eye Contact:   Fair   Psychomotor Behavior: Normal   Attitude:   Cooperative   Orientation:   All  Speech   Rate / Production: Normal    Volume:  Normal   Mood:    Anxious  Normal  Affect:    Appropriate  Labile   Thought Content:  Clear   Thought Form:  Coherent  Goal Directed   Insight:    Fair   Attention Span and Concentration:  limited  Recent and Remote Memory:  intact  Fund of Knowledge: appropriate  Muscle Strength and Tone: normal   Suicidal Ideation: reports no thoughts, no intention  Hallucination: No  Paranoid-No  Manic-No  Panic-No  Self harm-No    Diagnostic Test Results:  No results found for any visits on 01/06/20.     ASSESSMENT/PLAN:                                                    ASSESSMENT / PLAN:  (E11.42,  Z79.4) Type 2 diabetes mellitus with diabetic  polyneuropathy, with long-term current use of insulin (H)  (primary encounter diagnosis)  Comment: slight improvement, last HGBA1c 6.8 8/2019, working on diet and taking insulin as prescribed, neuropathy improved with gabapentin switch to 2x daily   Plan: continue current novolog and lantus and work on diet    (F51.5) Nightmares  Comment: improved   Plan: continue current meds     (N18.6) End stage chronic kidney disease (H)  Comment: worse,   Plan: long discussion today about kidney status and further decline and options, discussed POLST and changing to DNR status, discussed health care directives and health care agent, discussed home dialysis as well as going in 3x week for dialysis, vs no dialysis, patient is ready to make changes and move towards palliative care but  struggling.  Will reach out to her nephrologist and get feedback on dialysis and life of her kidney       (Z96.0) Chronic indwelling Johnson catheter  Comment: improved but has frequent UTI's   Plan: will restart gentamycin flushes 3x week     (Z71.89) Advanced care planning/counseling discussion  Comment: full code right now, no health care directives in file   Plan: long discussion today, POLST, healthcare directives, palliative care     Patient Instructions   Please review the POLST and the health care directives we discussed today     I will talk to your kidney specialist about dialysis and other options       ANTHONY Antonio [facility]    Visit time 60   min with > than 50% of the time spent in counseling on: DM, end stage kidney diease, infections, ACP

## 2020-01-06 NOTE — PROGRESS NOTES
"Gem Jade is a 74 year old female who is being evaluated via a billable telephone visit.      The patient has been notified of following:     \"This telephone visit will be conducted via a call between you and your physician/provider. We have found that certain health care needs can be provided without the need for a physical exam.  This service lets us provide the care you need with a short phone conversation.  If a prescription is necessary we can send it directly to your pharmacy.  If lab work is needed we can place an order for that and you can then stop by our lab to have the test done at a later time.    If during the course of the call the physician/provider feels a telephone visit is not appropriate, you will not be charged for this service.\"     Consent has been obtained for this service by 1 care team member: yes. See the scanned image in the medical record.    Gem Jade complains of  No chief complaint on file.      I have reviewed and updated the patient's Past Medical History, Social History, Family History and Medication List.    ALLERGIES  Atorvastatin calcium; Codeine; Darvocet [propoxyphene n-apap]; Hydromorphone; Levofloxacin; Morphine; Niaspan [niacin]; Percocet [oxycodone-acetaminophen]; and Vicodin [hydrocodone-acetaminophen]    Peyton Raymond CMA   (MA signature)    Additional provider notes: {ACUTE SUPERBRIEF LIST:304754}  {Chronic Problem SUPERLIST:259571}    Assessment/Plan:  {visit diagnosis:605973}    I have reviewed the note as documented above.  This accurately captures the substance of my conversation with the patient.  ***    Total time of call between patient and provider was *** minutes     {signature options:788902}      "

## 2020-01-07 NOTE — TELEPHONE ENCOUNTER
Prescription approved per [facility] Refill Protocol. Sandi Long RN January 7, 2020 1:19 PM

## 2020-01-08 NOTE — TELEPHONE ENCOUNTER
"ISSUE: Labs from 1/7/2020  Creatinine: 2.82  Hemoglobin: 8.6  Potassium: 5.6      PLAN:  Creatinine has improved slightly from 3s (if worsens, Dr. Elizabeth mentioned dialysis in discussion 12/12/19). Was hospitalized the month of November with recurrent catheter associated urinary tract infections; positive for MSSA and Citrobacter in urine. Has orders for gentamicinin irrigations.    Hemoglobin has been below 9 since mid-November. Patient does have a history of blood in her urine. Sent to Dr. Elizabeth for review and perhaps recommendation for anemia services but then did see after the fact that we had placed a referral for anemia services last month 12/12/19.     Dr. Elizabeth recommends a low potassium diet. Recheck of CMP has been ordered by PCP ANTHONY Edgar CNP on 1/13/2020.    Diabetes managed by PCP.    Pt's  was inquiring about end of life matters 12/18/19. See \"patient outreach\" encounter from Kassandra Khalil 12/24/19 regarding pallative care discussion. Home care orders were entered 12/31/19. Home care confirms that they have orders for home care but not for hospice at this time.      OUTCOME:  RNCC spoke with Chang regarding elevated potassium. Reviewed foods high in potassium and limiting use of those. Creatinine and hemoglobin results given to ; both results are slightly better than previous levels.      sounded distraught over the phone and did state that, \"we're devastated.\" Offered my sincere apologies for everything they are going through and provided my name and phone number should they need anything.         "

## 2020-01-08 NOTE — TELEPHONE ENCOUNTER
Message   Received: Yesterday   Message Contents   Ross Elizabeth MD Blaisdell, Phylicia Vee RN             Stable, but significantly elevated serum creatinine with low kidney function.  Also high serum potassium level and recommend low potassium diet.

## 2020-01-10 PROBLEM — N18.6: Status: ACTIVE | Noted: 2020-01-01

## 2020-01-10 NOTE — TELEPHONE ENCOUNTER
Relayed message to Berenice home care nurse. Verbalized understanding. Will continue gentamicin flushes.    Sumaya Hernandez RN   [facility]

## 2020-01-10 NOTE — PATIENT INSTRUCTIONS
Please review the POLST and the health care directives we discussed today     I will talk to your kidney specialist about dialysis and other options

## 2020-01-10 NOTE — TELEPHONE ENCOUNTER
Requested Prescriptions   Pending Prescriptions Disp Refills     gentamicin irrigation 1 Bottle 1     Sig: Irrigate rico cath 3x a week  60ml of 400mg of gentamicinin [id number] of 0.9% Sodium Chloride  On hold until done with current antibiotics   Last Written Prescription Date:  12/31/2019  Last Fill Quantity: 1 bottle,  # refills: 1   Last office visit: No previous visit found with prescribing provider:     Future Office Visit:        There is no refill protocol information for this order

## 2020-01-14 NOTE — TELEPHONE ENCOUNTER
TC to [facility] RN patient is refusing to into the ED, she instructed her and her  on low potassium diet as they did not really follow it yesterday, patient states she feels fine.    TC to patient to discuss plan, we will have the patient take one tab of lasix 40mg today, start low potassium diet and recheck labs tomorrow     [facility] RN informed of plan    Klaudia MTZ

## 2020-01-14 NOTE — RESULT ENCOUNTER NOTE
K elevated at 6.3. RNCC spoke with Berenice pt HCRN.  She reports she will f/u with Marivel Barcenas APRN CNP for plan of care.  Berenice aware this is a level that we request our pts to go to ER for treatment.  She v/u and will f/u with Marivel for plan.

## 2020-01-14 NOTE — TELEPHONE ENCOUNTER
ISSUE:  Potassium 6.3    OUTCOME:   Notes recorded by Eunice Veronica RN on 1/14/2020 at 3:29 PM CST  K elevated at 6.3. RNCC spoke with Berenice pt HCRN.  She reports she will f/u with Marivel Barcenas APRN CNP for plan of care.  Berenice aware this is a level that we request our pts to go to ER for treatment.  She v/u and will f/u with Marivel for plan and coordinate with pt.

## 2020-01-20 NOTE — TELEPHONE ENCOUNTER
Requested Prescriptions   Pending Prescriptions Disp Refills     sodium bicarbonate 650 MG tablet [Pharmacy Med Name: SODIUM BICARBONATE 650MG TABS] 30 tablet 0     Sig: TAKE TWO TABLETS BY MOUTH TWICE A DAY       There is no refill protocol information for this order        Last Written Prescription Date:  12/18/2019  Last Fill Quantity: 30,   # refills: 0      Routing refill request to provider for review/approval because:  Drug not on the [facility], [facility] or [facility] refill protocol or controlled substance

## 2020-01-20 NOTE — TELEPHONE ENCOUNTER
Medication/Refill approved per CPA:    [facility]LL[facility] AGREEMENT FOR  IMMUNOSUPPRESSENT PRESCRIPTION MODIFICATION.      Routing encounter to Transplant as an FYI.    Thanks,  Zaira Hale, PharmD  Specialty Pharmacist/Transplant  [facility]  [phone]

## 2020-01-20 NOTE — TELEPHONE ENCOUNTER
Not on refill protocol  No upcoming appts    Refill request to MD/primary care provider.  Amita Irby RN

## 2020-01-23 NOTE — PROGRESS NOTES
SUBJECTIVE:                                                    Gem Jade is a 74 year old female who is seen in the home today due to inability to leave the home because of chronic medical conditions, seen for the following health issues:     present for discussion     ADVANCED CARE PLANNING       Duration: kidney transplant x 20 years,  Now end stage, not a good candidate for dialysis      Description (location/character/radiation): currently sx are fatigue, hyperkalemia     Intensity:  severe    Accompanying signs and symptoms: frequent UTI's, central vertigo     History (similar episodes/previous evaluation): frequent UTI's,     Precipitating or alleviating factors: DM. Morbid obesity, bedridden     Therapies tried and outcome: followed by nephrology, see consult below, visit today occurred with  on health care directives, and POLST,      Dialysis is generally started when a patient has symptoms and not so much related to a specific laboratory value.  Uremic symptoms we usually see are nausea, maybe vomiting, and poor appetite.  Fatigue can certainly be a uremic symptom, but I would find it hard to interpret with her.  Edema that is uncontrolled with diuretics could be another indication.  Labs such as hyperkalemia or metabolic acidosis might be an indication, but this can generally be managed.  Finally, although symptoms are usually the indication and not labs for dialysis, most patients start dialysis when their GFR is between 5-10 ml/min.  Due to her being bedridden, I am not sure the GFR in our routine labs is that accurate for her.  If it would help in her decision making, would consider obtaining a cystatin C.  This is likely more accurate in patients with minimal muscle mass.     I am not sure she is a dialysis candidate, or at least not a good candidate.  I don't think she would be a candidate for home dialysis as she would generally have to go through a month of in center training  first.  That is the biggest issue, getting her to dialysis three times a week and having her do okay.  I have only seen her in the hospital on a couple of occasions over the years.     Unfortunately, we have no good way to determine how long she might live if she doesn't do dialysis.  She has had a fairly consistent decline in kidney function over the last 5 years and I would venture to guess she would meet the 5-10 ml/min range in the next year.  However, if she chooses not to do dialysis, then she could live for a considerable amount of time, months to a year or more after that.  Much of this would depend on whether she developed any further infections, especially UTIs, that might worsen her kidney function faster.                          Problems taking medications regularly: No    Medication side effects: none    Diet: diabetic    Social History     Tobacco Use     Smoking status: Former Smoker     Years: 17.00     Last attempt to quit: 1987     Years since quittin.6     Smokeless tobacco: Never Used   Substance Use Topics     Alcohol use: No        Problem list and histories reviewed & adjusted, as indicated.  Additional history: as documented    Patient Active Problem List   Diagnosis     Kidney replaced by transplant     Primary localized osteoarthrosis, lower leg     Pulmonary embolism and infarction (H)     Diabetes mellitus with background retinopathy (H)     Hyperlipidemia LDL goal <100     Diabetic gastroparesis (H)     Back pain     Compression fx, thoracic spine (H)     Anemia     Clostridium difficile diarrhea     Hydronephrosis     Recurrent UTI     Benign essential hypertension     Vancomycin resistant Enterococcus     Clostridium difficile colitis     Diabetic polyneuropathy associated with type 2 diabetes mellitus (H)     Chronic shoulder pain, unspecified laterality     Morbid obesity, unspecified obesity type (H)     Health Care Home     Paroxysmal A-fib (H)     Acute kidney injury (H)      Pancytopenia, acquired (H)     Obstructive sleep apnea syndrome     Complicated UTI (urinary tract infection)     Altered Mental Status, Probable Metabolic encephalopathy     Chronic indwelling Johnson catheter     Seizure disorder (H)     Dermatitis     Encephalopathy acute     Type 2 diabetes mellitus with diabetic polyneuropathy, with long-term current use of insulin (H)     Sepsis (H)     Overdose, accidental or unintentional, subsequent encounter     CKD (chronic kidney disease) stage 3, GFR 30-59 ml/min (H)     Paranoia (H)     Altered mental status     UTI (urinary tract infection)     End stage chronic kidney disease (H)     Past Surgical History:   Procedure Laterality Date     AMPUTATE TOE(S) Right 2019    Procedure: RIGHT FIRST TOE AMPUTATION;  Surgeon: Armen Jefferson DPM;  Location: [facility] OR     C NONSPECIFIC PROCEDURE  10/99    kidney transplant     C NONSPECIFIC PROCEDURE      MRI head, lumbar spine, pelvis     C TOTAL KNEE ARTHROPLASTY  3/03    Knee Replacement, Total right, post op PE     CHOLECYSTECTOMY       HC LEFT HEART CATHETERIZATION      Cardiac Cath, Left Heart, Negative  (@ [facility])     HC LEFT HEART CATHETERIZATION      normal coronary angiogram at [facility], had mild troponin rise (0.71)     HC REMV CATARACT EXTRACAP,INSERT LENS, W/O ECP      bilateral cataract repaired, left eye      IR PICC PLACEMENT > 5 YRS OF AGE  2019       Social History     Tobacco Use     Smoking status: Former Smoker     Years: 17.00     Last attempt to quit: 1987     Years since quittin.6     Smokeless tobacco: Never Used   Substance Use Topics     Alcohol use: No     Family History   Problem Relation Age of Onset     Diabetes Mother      Parkinsonism Mother      Diabetes Brother      Hypertension Father         [id number] pneumonia     Heart Disease Father            Current Outpatient Medications   Medication Sig Dispense Refill     acetaminophen (TYLENOL) 325 MG tablet Take  325-650 mg by mouth every 4 hours as needed        amLODIPine (NORVASC) 5 MG tablet Take 1 tablet (5 mg) by mouth daily 90 tablet 0     aspirin 81 MG tablet Take 81 mg by mouth daily       baclofen (LIORESAL) 10 MG tablet 10mg 2x a month with cath changes 60 tablet 3     blood glucose (NO BRAND SPECIFIED) lancets standard Use to test blood sugar 3 times daily or as directed. (Accuchek guide) 300 each 3     blood glucose (NO BRAND SPECIFIED) test strip Use to test blood sugar 3 times daily or as directed. (accuchek guide) 300 strip 3     COMFORT EZ PEN NEEDLES 31G X 6 MM miscellaneous USE 4 DAILY OR AS DIRECTED 100 each 3     CRANBERRY PO Take 1 tablet by mouth 2 times daily        cyanocobalamin (CYANOCOBALAMIN) 1000 MCG/ML injection Inject 1 mL (1,000 mcg) into the muscle every 2 months 3 mL 1     diphenoxylate-atropine (LOMOTIL) 2.5-0.025 MG tablet Take 1-2 tablets by mouth 4 times daily as needed for diarrhea 360 tablet 0     famotidine (PEPCID) 20 MG tablet Take 1 tablet (20 mg) by mouth 2 times daily 60 tablet 1     furosemide (LASIX) 40 MG tablet Take 1 tablet (40 mg) by mouth daily 1 tablet 0     gabapentin (NEURONTIN) 300 MG capsule Take 1 capsule (300 mg) by mouth 2 times daily 60 capsule 1     gentamicin irrigation Irrigate rico cath 3x a week  60ml of 400mg of gentamicinin [id number] of 0.9% Sodium Chloride 1 Bottle 1     insulin aspart (NOVOLOG FLEXPEN) 100 UNIT/ML pen Take 10 units with meals + sliding scale if eating. Sliding scale only at bedtime when not eating. Max 25 units per day. Hold if pre-meal glucose is <90  Sliding scale:   140-199 2u  200-249 4u  250-299 6u  300-349 8u  350-399 10u  Over 400 12u 45 mL 3     insulin glargine (LANTUS SOLOSTAR) 100 UNIT/ML pen Inject 20 Units Subcutaneous At Bedtime 30 mL 3     labetalol (NORMODYNE) 300 MG tablet Take 1 tablet (300 mg) by mouth 2 times daily 180 tablet 0     lactobacillus rhamnosus, GG, (CULTURELL) capsule Take 1 capsule by mouth 3 times daily  "60 capsule 11     levothyroxine (SYNTHROID/LEVOTHROID) 50 MCG tablet TAKE ONE TABLET BY MOUTH EVERY DAY 90 tablet 0     lidocaine (XYLOCAINE) 2 % external gel Apply topically 3 times daily (Patient taking differently: Apply topically 3 times daily as needed ) 85 mL 11     loratadine (CLARITIN) 10 MG tablet Take 1 tablet (10 mg) by mouth daily 90 tablet 3     menthol-zinc oxide (CALMOSEPTINE) 0.44-20.625 % OINT ointment Apply topically 4 times daily as needed for skin protection       ondansetron (ZOFRAN ODT) 4 MG ODT tab Take 1 tablet (4 mg) by mouth daily as needed for nausea 30 minutes before getting up in your wheelchair 20 tablet 1     order for DME Equipment being ordered: [employer] Mobility System, Titan 500, free standing overhead patient lift    Please send to  DME 1 Device 0     order for DME Equipment being ordered: CPAP mask, [employer]Med Ahuja FX, nasal pillows system, women's,   Catalog # [id number]    Please send to  DME 1 Device 3     order for DME Equipment being ordered: Tegaderm 4x4   MICHELLE 6 months  Sacral wound 1 Box 3     order for DME Equipment being ordered: AtomShockwave gel-T cushion 20 inches wide & 18 inches deep    height 1.676 m (5' 6\"), weight 101.6 kg (223 lb 15.8 oz) 1 each 0     OYSTER SHELL CALCIUM/D 500-200 MG-UNIT tablet TAKE ONE TABLET BY MOUTH THREE TIMES A DAY 90 tablet 0     pravastatin (PRAVACHOL) 10 MG tablet Take 1 tablet (10 mg) by mouth daily 90 tablet 3     prazosin (MINIPRESS) 1 MG capsule Take 2 capsules (2 mg) by mouth At Bedtime 180 capsule 1     predniSONE (DELTASONE) 5 MG tablet Take 1 tablet (5 mg) by mouth daily 90 tablet 3     simethicone (MYLICON) 125 MG CHEW Take 1 tablet (125 mg) by mouth as needed for intestinal gas 120 tablet      sodium bicarbonate 650 MG tablet TAKE TWO TABLETS BY MOUTH TWICE A DAY 30 tablet 3     tacrolimus (GENERIC EQUIVALENT) 1 MG capsule Take 2 capsules (2 mg) by mouth 2 times daily Appointment required for refills. 120 capsule 11     vitamin D3 " (CHOLECALCIFEROL) 2000 units (50 mcg) tablet Take 2,000 Units by mouth 2 times daily        Allergies   Allergen Reactions     Atorvastatin Calcium      myalgias, muscle aches     Codeine Nausea and Vomiting     Darvocet [Propoxyphene N-Apap] Nausea and Vomiting     Hydromorphone      Levofloxacin Other (See Comments) and Diarrhea     hallucinations     Morphine      Nausea and vomiting     Niaspan [Niacin] GI Disturbance     Flushing even at low dose, GI upset     Percocet [Oxycodone-Acetaminophen]      Vomiting after taking med couple of times     Vicodin [Hydrocodone-Acetaminophen] Nausea and Vomiting     Recent Labs   Lab Test 01/15/20  1400 01/14/20  1230 01/07/20  1230  11/23/19  0545  11/01/19  0920  08/07/19  0855  07/01/19  1208  06/03/19  0730  04/24/19  1258  12/05/16  1210  02/05/15  1250  05/16/13  1240   A1C  --   --   --   --   --   --  5.8*  --  6.8*  --   --   --   --   --  6.2*   < > 7.4*   < > 6.9*   < > 7.2*   LDL  --   --   --   --   --   --   --   --   --   --   --   --   --   --   --   --  64  --  18  --  149*   HDL  --   --   --   --   --   --   --   --   --   --   --   --   --   --   --   --  46*  --  43*  --  42*   TRIG  --   --   --   --   --   --   --   --   --   --   --   --   --   --   --   --  272*  --  223*  --  262*   ALT  --  25 39  --  <6   < >  --    < >  --    < >  --    < >  --    < > 22   < >  --    < >  --    < >  --    CR 3.09* 3.33* 2.82*   < > 3.30*   < > 2.50*   < > 2.07*   < > 1.68*   < >  --    < > 1.78*   < >  --    < > 0.67   < > 0.70   GFRESTIMATED 14* 13* 16*   < > 13*   < > 18*   < > 23*   < > 30*   < >  --    < > 28*   < >  --    < > 87   < > 84   GFRESTBLACK 17* 15* 18*   < > 15*   < > 21*   < > 27*   < > 35*   < >  --    < > 32*   < >  --    < > >90   GFR Calc     < > >90   POTASSIUM 5.5* 6.3* 5.6*   < > 3.9   < > 4.1   < > 4.5   < > 4.5   < >  --    < > 4.5   < >  --    < > 4.3   < > 3.9   TSH  --   --   --   --   --   --   --   --   --   --   2.43  --  1.28  --   --    < >  --    < >  --    < >  --     < > = values in this interval not displayed.      BP Readings from Last 3 Encounters:   11/26/19 113/56   11/19/19 (!) (P) 149/69   11/15/19 (!) 141/30    Wt Readings from Last 3 Encounters:   11/26/19 108.6 kg (239 lb 8 oz)   11/01/19 103 kg (227 lb 1.2 oz)   08/28/19 103.9 kg (229 lb)          Labs reviewed in EPIC  Problem list, Medication list, Allergies, and Medical/Social/Surgical histories reviewed in [facility] and updated as appropriate.      ROS: Constitutional, neuro, ENT, endocrine, pulmonary, cardiac, gastrointestinal, genitourinary, musculoskeletal, integument and psychiatric systems are negative, except as otherwise noted above in the HPI.      OBJECTIVE:                                                    There were no vitals taken for this visit.  There is no height or weight on file to calculate BMI.    GENERAL: healthy, alert and mild distress  RESP: lungs clear to auscultation - no rales, rhonchi or wheezes  CV: regular rate and rhythm, normal S1 S2, no S3 or S4, no murmur, click or rub, no peripheral edema  ABDOMEN: soft, nontender, bowel sounds normal  MS: no gross musculoskeletal defects noted, no edema    Mental Status Assessment:  Appearance:   In bed, baseline  Eye Contact:   Fair   Psychomotor Behavior: Normal   Attitude:   Cooperative   Orientation:   All  Speech   Rate / Production: Normal    Volume:  Normal   Mood:    Anxious  Normal  Affect:    Appropriate  Labile   Thought Content:  Clear   Thought Form:  Coherent  Goal Directed  Logical   Insight:    Fair   Attention Span and Concentration:  limited  Recent and Remote Memory:  intact  Fund of Knowledge: appropriate  Muscle Strength and Tone: normal   Suicidal Ideation: reports no thoughts, no intention  Hallucination: No  Paranoid-No  Manic-No  Panic-No  Self harm-No        Diagnostic Test Results:  No results found for any visits on 01/22/20.     ASSESSMENT/PLAN:                                                     ASSESSMENT / PLAN:  (Z71.89) ACP (advance care planning)  (primary encounter diagnosis)  Comment: discussion today about POLST and healthcare directives, with  who is her Healthcare agent   Plan: paperwork completed, code status changed to DNR     (N18.6) End stage chronic kidney disease (H)  Comment: reviewed consult from Nephrologist, discussed options   Plan: DNR (Do Not Resuscitate)        POLST completed and healthcare directives, patient does not want to pursue dialysis, will explore hospice once kidney function further declines        Marivel Barcenas, NP, APRN [facility]    Visit time 60   min with > than 50% of the time spent in counseling on: kidney diease, dialysis, ACP     ACP 45minutes

## 2020-01-23 NOTE — PATIENT INSTRUCTIONS
I will change your code status today and we will scan your POLST into your chart    Please have a Power of  witness your Health care directive paperwork     I will do a phone visit in one month

## 2020-01-24 NOTE — TELEPHONE ENCOUNTER
Called patient, no answer. Left [facility] stating that patient should be seen today if possible otherwise recommended bringing patient to urgent care to be seen. Frances Lira RN on 1/24/2020 at 2:02 PM

## 2020-01-24 NOTE — TELEPHONE ENCOUNTER
"Called  Ariana, changed catheter yesterday. There was sediment when she changed the old catheter. Ariana said patient seemed like herself yesterday during visit, was AAO x4 and \"giggly.\"  UA/UC could be done Sunday in home. Ariana is going to reach out to , is patient is still confused she will get UA/UC on Sunday. Ariana states she will call writer back after touching base with Chang Jade. Frances Lira RN on 1/24/2020 at 3:00 PM    "

## 2020-01-24 NOTE — TELEPHONE ENCOUNTER
"Ariana called back. Ariana talked with patient and [facility] there at the house. Patient is AAOx4, and [facility] Eloisa was there and stated that patient seems fine/normal self. No need for UA/UC. Patient was \"very sleepy\" this morning and was confused for a bit since  woke her up. Frances Lira RN on 1/24/2020 at 3:06 PM      "

## 2020-01-24 NOTE — TELEPHONE ENCOUNTER
Reason for Call:  Other call back    Detailed comments: Patient's  called stating that the patient woke up very confused this morning. Patient's  reports that whenever infection symptoms arise, to contact the patient's provider.         Phone Number Patient can be reached at: Cell number on file:    Telephone Information:   Mobile [phone]       Best Time: Any    Can we leave a detailed message on this number? YES    Call taken on 1/24/2020 at 12:05 PM by Naheed Segura

## 2020-01-30 NOTE — TELEPHONE ENCOUNTER
Home health care and plan of certification and plan of care [phone] at  [facility].  Cecilia Hernandez MA

## 2020-02-03 NOTE — TELEPHONE ENCOUNTER
Home Health Certification and Plan of Care faxed to [phone] at [facility] .  Cecilia Hernandez MA

## 2020-02-03 NOTE — TELEPHONE ENCOUNTER
SW 1 Every 30 Days 1 faxed to [phone] at [facility].  Cecilia Hernandez MA

## 2020-02-04 NOTE — TELEPHONE ENCOUNTER
Spoke with home care nurse and approved continuation of visits; fax number provided. Sandi Long RN February 4, 2020 11:57 AM

## 2020-02-04 NOTE — TELEPHONE ENCOUNTER
Reason for Call:  Home Health Care    Berenice with [facility] called regarding (reason for call): Verbal Orders    Orders are needed for this patient. Skilled Nursing    PT:     OT:     Skilled Nursin times a week for 8 weeks with 4 PRN    HHA : 1 time a week for 1 week, 2 times a week for 8 weeks.    Labs and catheter care as ordered.    Pt Provider: Klaudia Ching    Phone Number Homecare Nurse can be reached at: [phone]    Can we leave a detailed message on this number? YES    Phone number patient can be reached at: Home number on file [phone] (home)    Best Time: Anytime    Call taken on 2020 at 10:46 AM by Nanda Horan

## 2020-02-05 NOTE — TELEPHONE ENCOUNTER
Keshawn called on behalf of patient     [phone]    Hasn't tried to order tacrolimus in 2020.  Estimates about 1 week of medication left - filled 1/20/20.

## 2020-02-06 NOTE — TELEPHONE ENCOUNTER
"Requested Prescriptions   Pending Prescriptions Disp Refills     levothyroxine (SYNTHROID/LEVOTHROID) 50 MCG tablet [Pharmacy Med Name: LEVOTHYROXINE SODIUM 50MCG TABS] 90 tablet 0     Sig: TAKE ONE TABLET BY MOUTH EVERY DAY   Last Written Prescription Date:  11/4/2019  Last Fill Quantity: 90,  # refills: 0   Last office visit: 1/22/2020 with prescribing provider   Future Office Visit:   Next 5 appointments (look out 90 days)    Feb 19, 2020  3:30 PM CST  Telephone Visit with ANTHONY Antonio CNP  [facility] [address]  [address]  [facility] [id number] [phone]             Thyroid Protocol Passed - 2/6/2020 12:08 PM        Passed - Patient is 12 years or older        Passed - Recent (12 mo) or future (30 days) visit within the authorizing provider's specialty     Patient has had an office visit with the authorizing provider or a provider within the authorizing providers department within the previous 12 mos or has a future within next 30 days. See \"Patient Info\" tab in inbasket, or \"Choose Columns\" in Meds & Orders section of the refill encounter.              Passed - Medication is active on med list        Passed - Normal TSH on file in past 12 months     Recent Labs   Lab Test 07/01/19  1208   TSH 2.43              Passed - No active pregnancy on record     If patient is pregnant or has had a positive pregnancy test, please check TSH.          Passed - No positive pregnancy test in past 12 months     If patient is pregnant or has had a positive pregnancy test, please check TSH.          Prescription approved per [facility].  Frances Lira RN on 2/6/2020 at 3:03 PM    "

## 2020-02-06 NOTE — TELEPHONE ENCOUNTER
Faxed forms to [phone]    Documents sent to abstract for scanning.    Theodore Galindo MA

## 2020-02-06 NOTE — TELEPHONE ENCOUNTER
ISSUE:   Tacrolimus IR level 7.0 on 2/4/2020, goal 4-6, dose 2 mg BID.    PLAN:   Please call patient's  and confirm this was an accurate 12-hour trough. Verify Tacrolimus IR dose 2 mg BID. Confirm no new medications or illness. Confirm no missed doses. If accurate trough and accurate dose, decrease Tacrolimus IR dose to 2 mg in the morning and 1 mg in the evening and repeat labs in 1 week. Send orders to [facility].    Phylicia Damian RN, BSN  Solid Organ Transplant, Post Kidney and Pancreas  Transplant Care Coordinator  [phone] option 5      OUTCOME:   Spoke with patient, they confirm accurate trough level and current dose 2 mg BID. Patient confirmed dose change to 2/1 mg BID and to repeat labs in 1 weeks. Orders sent to [facility] pharmacy for dose change and lab for repeat labs. Patient voiced understanding of plan.

## 2020-02-06 NOTE — TELEPHONE ENCOUNTER
Faxed forms to [phone].      Documents sent to abstract for scanning.    Theodore Galindo MA

## 2020-02-06 NOTE — TELEPHONE ENCOUNTER
Faxed forms to [phone].    Documents sent to abstract for scanning.    Theodore Galindo MA

## 2020-02-06 NOTE — LETTER
PHYSICIAN ORDERS      DATE & TIME ISSUED: 2020 4:15 PM  PATIENT NAME: Gem Jade   : 1945     [facility] MR# [if applicable]: [id number]     DIAGNOSIS:  Kidney Transplant  ICD-10 CODE: Z94.0     Please repeat the following labs in 1 week:  Tacrolimus drug level  CBC  BMP    Any questions please call: [phone]    .

## 2020-02-06 NOTE — TELEPHONE ENCOUNTER
Faxed forms to [phone].    Documents sent to abstract for scanning.    Theodore Galindo MA

## 2020-02-10 NOTE — TELEPHONE ENCOUNTER
Spoke with Shantelle, she wrote down & repeated back to me the new medication orders as below. She agrees's with plan & had no questions.   Marivel Klein RN   walker

## 2020-02-10 NOTE — PROGRESS NOTES
Clinic Care Coordination Contact    Follow Up Progress Note      Assessment: pro-active outreach call placed to patient. Patient admits to sleeping too much, sometimes sleeps through the next day. Avoiding foods that are high in potassium. Was told she had a critical value with her last hospitalization. Completed advance care planning with PCP at last visit.    Goals addressed this encounter:   Goals Addressed                 This Visit's Progress      Medical (pt-stated)   Not on track     #1-Goal Statement: I will reduce the number of UTI's I experience  Measure of Success: reduction in number of UTI's  Supportive Steps to Achieve: home care nurse to request orders for more frequent catheter changes and increased pericare per week  Barriers: resistant UTI's  Strengths: assistance as above  Date to Achieve By: 12-1-19          COMPLETED: Other (pt-stated)        3-Goal Statement: I will complete advance care planning  Date Goal set: 12-24-19  Date to Achieve By: 3-24-20  Patient expressed understanding of goal: yes  Action steps to achieve this goal:  1. I will read information about advance care planning sent to me by my RN CC  2. I will participate in continued conversations about advance care planning and palliative care with my RN CC and PCP  3. I will talk with my family about my end of life wishes                 Intervention/Education provided during outreach: written information regarding foods containing potassium mailed to patient          Plan:   Keep appointment with PCP as scheduled.   Care Coordinator will follow up in approximately one month    Porsha Khalil RN  [place] [place] [facility]-Care Coordination  [facility]  [facility]  [phone]

## 2020-02-12 NOTE — TELEPHONE ENCOUNTER
Requested Prescriptions   Pending Prescriptions Disp Refills     gabapentin (NEURONTIN) 100 MG capsule       Sig: Take 1 capsule (100 mg) by mouth 3 times daily       There is no refill protocol information for this order        Problem List Complete:  Yes    Last Written Prescription Date:  11/26/19  Last Fill Quantity: 30,   # refills:     MEDICATION IS CURRENTLY DISCONTINUED PATIENT ACTIVE RX IS  MG    Last Office Visit with [facility] primary care provider: 1/22/20    Future Office visit:   Next 5 appointments (look out 90 days)    Feb 19, 2020  3:30 PM CST  Telephone Visit with ANTHONY Antonio CNP  [facility] [place] [facility] [address] [place] [address]  [address]  [facility] [id number] [phone]          Controlled substance agreement:   Encounter-Level CSA:    There are no encounter-level csa.     Patient-Level CSA:    There are no patient-level csa.         Last Urine Drug Screen: No results found for: CDAUT, No results found for: COMDAT, No results found for: THC13, PCP13, COC13, MAMP13, OPI13, AMP13, BZO13, TCA13, MTD13, BAR13, OXY13, PPX13, BUP13     Processing:  e-prescribe    [email or web address]   checked in past 3 months?  No, route to RN [place]  reviewed 2/12/2020    Last refill: 11/8/19  Disp: 30    Concerns: PATIENT IS CURRENTLY BEING PRESCRIBED 300 MG

## 2020-02-12 NOTE — TELEPHONE ENCOUNTER
Patient needs new RX for Gabapentin 100mg last proscribed by hospitalist.    Gapapentin 100MG  Qty 30  Last Filled 11/08/2019  Taking one capsule three times daily

## 2020-02-13 NOTE — TELEPHONE ENCOUNTER
Provider Call: Transplant Lab/Orders  Route to LPN  Post Transplant Days: 7414  When patient is less than 60 days post-transplant, route high priority  Reason for Call: Discuss lab results; which results? Pt took her medication for tacro before the HC RN could draw her so she'll do due labs on tuesday the 18th   Liver patients reporting abnormal lab results: Route to RN and Page  Document lab facility information when provider is calling about annual lab orders. Delete facility wildcards when not needed.    Callback needed? Yes    Return Call Needed  Same as documented in contacts section  When to return call?: Greater than one day: Route standard priority

## 2020-02-19 NOTE — PROGRESS NOTES
"Gem Jade is a 74 year old female who is being evaluated via a billable telephone visit.      The patient has been notified of following:     \"This telephone visit will be conducted via a call between you and your physician/provider. We have found that certain health care needs can be provided without the need for a physical exam.  This service lets us provide the care you need with a short phone conversation.  If a prescription is necessary we can send it directly to your pharmacy.  If lab work is needed we can place an order for that and you can then stop by our lab to have the test done at a later time.    If during the course of the call the physician/provider feels a telephone visit is not appropriate, you will not be charged for this service.\"     Consent has been obtained for this service by 1 care team member: yes. See the scanned image in the medical record.    Gem Jade complains of    Chief Complaint   Patient presents with     Follow Up     Patient forgot what she wanted to discuss with provider during today's visit. Otherwise patient reports overall she is doing good.    I have reviewed and updated the patient's Past Medical History, Social History, Family History and Medication List.    ALLERGIES  Atorvastatin calcium; Codeine; Darvocet [propoxyphene n-apap]; Hydromorphone; Levofloxacin; Morphine; Niaspan [niacin]; Percocet [oxycodone-acetaminophen]; and Vicodin [hydrocodone-acetaminophen]    Theodore Galindo (MA signature)    Additional provider notes:   Diabetes Follow-up       How often are you checking your blood sugar? Two times daily    Blood sugar testing frequency justification:  Uncontrolled diabetes and Adjustment of medication(s)    What time of day are you checking your blood sugars (select all that apply)?  Before meals and At bedtime    Have you had any blood sugars above 200?  Rare. Depends on diet     Have you had any blood sugars below 70?  No    What symptoms do you notice when " your blood sugar is low?  Shaky, Weak and Confusion    What concerns do you have today about your diabetes? None     Do you have any of these symptoms? (Select all that apply)  Burning in feet, but has improved with splitting gabapentin into 2 doses 300mg 2x daily rarely taking the extra 100mg      Have you had a diabetic eye exam in the last 12 months? No     Chronic Kidney Disease Follow-up    Do you take any over the counter pain medicine?: no      How many servings of fruits and vegetables do you eat daily?  2-3    On average, how many sweetened beverages do you drink each day (Examples: soda, juice, sweet tea, etc.  Do NOT count diet or artificially sweetened beverages)?   0    How many days per week do you exercise enough to make your heart beat faster?NA bedbound      How many minutes a day do you exercise enough to make your heart beat faster? NA    How many days per week do you miss taking your medication? 0       Mental Health Follow up depression/anxiety     Status since last visit: improved     See PHQ-9 for current symptoms.  Other associated symptoms: None    Complicating factors: decline in kidney function   Significant life event:  No     Current substance abuse:  None  Anxiety or Panic symptoms:  No     Sleep - improved with prazosin, taking 2 at bedtime no more nightmares     Appetite - baseline, eating 2 meals a day most days   Exercise - none, bed ridden      Smoking - no  Alcohol - no  Street drugs - no  Marijuana - no  Caffeine - occ    UTI - Female  Duration of complaint: no sx, cath being changed every 3 weeks, gentamicin flushes 2x a week, urine clear, no sx      URI SX:  Duration of complaint: had a bad sore throat, used throat log and humidifier with good results, no other sx except earache no sx now,      Assessment/Plan:  (E11.42,  Z79.4) Type 2 diabetes mellitus with diabetic polyneuropathy, with long-term current use of insulin (H)  (primary encounter diagnosis)  Comment: improved with  regular eating and more consistent insulin use   Plan: discussed insulin regime, checking sugars, diet     (N18.6) End stage chronic kidney disease (H)  Comment: improving, last Creatinine 2.98, potassium WNL, GFR 15    Plan: continue meds,     (F51.5) Nightmares  Comment: improved   Plan: continue medication at present dose     (N39.0) Recurrent UTI  Comment: improved, no sx   Plan: gentamicin irrigation        Continue gentamicin flushes 2x a week     (J02.9) Acute pharyngitis, unspecified etiology  Comment: improved with sx treatment  Plan: discussed sx treatment call if worsens     Patient Instructions   Continue meds unchanged    Please call if your throat hurts again     We will continue your other treatments without change       I have reviewed the note as documented above.  This accurately captures the substance of my conversation with the patient.  Klaudia MCFARLANEP      Total time of call between patient and provider was 30 minutes     Marivel Barcenas, NP, APRN CNP

## 2020-02-19 NOTE — PATIENT INSTRUCTIONS
Continue meds unchanged    Please call if your throat hurts again     We will continue your other treatments without change

## 2020-02-20 NOTE — TELEPHONE ENCOUNTER
"Requested Prescriptions   Pending Prescriptions Disp Refills     OYSTER SHELL CALCIUM/D 500-200 MG-UNIT PO tablet [Pharmacy Med Name: OYSTER SHELL CALCIUM/D 500-200 TABS] 150 tablet 0     Sig: TAKE ONE TABLET BY MOUTH THREE TIMES A DAY   Last Written Prescription Date:  1/9/2020  Last Fill Quantity: 90,  # refills: 0   Last office visit: 2/19/2020 with prescribing provider   Future Office Visit:        Vitamin Supplements (Adult) Protocol Passed - 2/19/2020 12:50 PM        Passed - High dose Vitamin D not ordered        Passed - Recent (12 mo) or future (30 days) visit within the authorizing provider's specialty     Patient has had an office visit with the authorizing provider or a provider within the authorizing providers department within the previous 12 mos or has a future within next 30 days. See \"Patient Info\" tab in inbasket, or \"Choose Columns\" in Meds & Orders section of the refill encounter.              Passed - Medication is active on med list        GABAPENTIN 300 MG PO capsule [Pharmacy Med Name: GABAPENTIN 300MG CAPS] 60 capsule 1     Sig: TAKE TWO CAPSULES BY MOUTH AT BEDTIME       There is no refill protocol information for this order       Last Written Prescription Date:  2/13/2020  Last Fill Quantity: 90,   # refills: 3    Routing refill request to provider for review/approval because:  Drug not on the [facility], [facility] or [facility] refill protocol or controlled substance       Unable to check , writer is not an approved delegate. Frances Lira RN on 2/20/2020 at 11:18 AM    "

## 2020-02-20 NOTE — ED NOTES
Bed: ED14  Expected date:   Expected time:   Means of arrival:   Comments:  DASH Hermosillo  
ED MD at bedside assessing patient, per MD no pain medication intervention ordered at this time.   
Keshawn,    [phone]  
Pt BIBA for decreased LOC. Upon arrival pt minimally responsive to voice, unable to follow commands, unable to assess orientation. Has chronic rico and limited mobility d/t orthopedic surgeries. Frequently presents with confusion/decreased LOC with UTI. Pt had rico replaced multiple times over the past month, most recently today.   
75 y/o WF evaluated for EGD / enteroscopy.  ASA 4.  Plan GA.  Plan, benefits, foreseeable risks, viable alternatives discussed with patient and all her pertinent questions answered and she understands and elects to proceed.

## 2020-02-21 NOTE — TELEPHONE ENCOUNTER
Reason for Call:  Medication or medication refill:    Do you use a [facility]?  Name of the pharmacy and phone number for the current request:  [facility] - [place], [place] - [address]  P: [phone]  F: [phone]    Name of the medication: SODIUM BICARBONATE 650 MG PO tablet    Other request: [facility] called clinic regarding Rx. Per pharamcy, please send 120 tablets and with refills.    Can we leave a detailed message on this number? someone will answer    Phone number patient can be reached at: [phone]    Best Time: no preferred time    Call taken on 2/21/2020 at 12:56 PM by Yohana Moseley

## 2020-02-21 NOTE — TELEPHONE ENCOUNTER
Prescription approved per [facility] Refill Protocol. Sandi Long RN February 21, 2020 4:58 PM

## 2020-02-25 NOTE — TELEPHONE ENCOUNTER
Last Written Prescription Date:  2/19/20  Last Fill Quantity: 1 bottle,   # refills: 1  Last Office Visit: 1/22/20  Future Office visit:    Next 5 appointments (look out 90 days)    Mar 17, 2020  3:30 PM CDT  Telephone Visit with ANTHONY Antonio CNP  [facility] ([facility]) [address]   [address]  [facility] [id number] [phone]           Routing refill request to provider for review/approval because:  Drug not on the [facility], [facility] or  Health refill protocol or controlled substance    Sumaya Hernandez RN   [facility]

## 2020-02-25 NOTE — TELEPHONE ENCOUNTER
Requested Prescriptions   Pending Prescriptions Disp Refills     gentamicin irrigation 1 Bottle 1     Sig: Irrigate rcio cath 2x a week  60ml of 400mg of gentamicinin [id number] of 0.9% Sodium Chloride  Last Written Prescription Date:  02/21/20  Last Fill Quantity: 1 Bottle,  # refills: 0   Last office visit: 2/19/2020 with prescribing provider:  Tianna Barcenas   Future Office Visit: 03/17/20  Next 5 appointments (look out 90 days)    Mar 17, 2020  3:30 PM CDT  Telephone Visit with ANTHONY Antonio CNP  [facility] ([facility]) [address]   [address]  [facility] [address] [phone]                There is no refill protocol information for this order

## 2020-03-01 NOTE — PROGRESS NOTES
[facility]    Medicine Progress Note - Hospitalist Service       Date of Admission:  11/19/2019  Assessment & Plan   Ms. Gem Jade is a pleasant 74-year-old female with complex past medical history of hypertension, dyslipidemia, diabetes mellitus type 2, history of renal transplant on prednisone and tacrolimus with CKD, chronic indwelling Johnson catheter with recurrent catheter-associated UTIs who presented initially to [facility] to have a PICC line placed.  Throughout the day, patient became more lethargic and somnolent, which is similar to her prior presentations for recurrent UTIs, and she is admitted for further evaluation.       Recurrent catheter-associated urinary tract infections.    She has a chronic Johnson indwelling catheter.  Last hospitalization was from 10/31 to 11/08/2019.  Her urine cultures were positive for Citrobacter and MSSA.  She also bacteremic with MSSA.  She was treated with Zosyn in the hospital and switched to Ancef as per ID to continue for 3 more weeks for a total of 4 weeks of antibiotics.  She was discharged with Medline in place.  Apparently Medline did not work properly.  It was replaced.  Even a second Medline did not work properly and she was asked to come to ER to have a PICC line placed.  IV team tried not successful to place a PICC line earlier today and eventually she had a PICC line placed by IR.    - Grossly abnormal UA with , large LE, moderate blood  - No reported fever, initial lactic acid 0.8, initially did have altered mental status-similar to prior hospitalizations  - Obvious concern for incomplete treatment given prior issues with midline and home IV ABX dosing  - Urine cultures pending, but also  - Zosyn continued-ID following, appreciated  - Continues to be afebrile today.     History of obstructive sleep apnea   - order CPAP as per home settings.   - Did require soft wrist restraints last night due to agitation which she  "recalls today as she \"did not want to be here\".    - As of midday 11/21 she is off BiPAP for a few hours mentating nearly at baseline     Acute encephalopathy, metabolic - improved  likely related to her infection and worsening renal function and possible underlying severe sleep apnea contributing:  It seems that she had similar presentations in the past associated with her UTIs.  During her most recent hospitalization in the beginning of November, she had also problems with delirium.  She was seen by Psychiatry.  It seems that the delirium resolved.   - improved/normalized with treatment of UTI and IV fluids     Hypertension    Her blood pressure was on the higher side initially.  Later on better controlled.  We will resume her prior to admission medications including labetalol 300 mg twice daily and Norvasc 10 mg p.o. daily with holding parameters.  I am holding her prior to admission Prazosin for now.  Will closely monitor her blood pressures and hydralazine IV p.r.n. has been ordered for elevated blood pressures.  If her blood pressure remains persistently elevated, can resume her prior to admission Prazosin as well.      Acute kidney injury on chronic kidney disease, stage III to IV.   - baseline somewhere around 2.5,   - 11/21 creat 3.5 down from peak of 3.8  - appreciate nephrology assistance and managing IVF    Mild hyperkalemia  - 5.4, fluids ongoing, nephrology following     History of renal transplant    Her baseline creatinine had been between 1.5-1.8 earlier this year more recently 2.5.  More recently between 2.4-2.6.  She presented with a creatinine of 3.88 which is clearly above her baseline.    - resume her prior to admission prednisone 5 mg p.o. daily (increased for now to 10) and tacrolimus 2 mg p.o. twice daily.       Dyslipidemia  - Hold prior to admission Pravachol; can be resumed later or at discharge     Hypothyroidism    - PTA levothyroxine po resumed     Diabetes mellitus type 2, " insulin-dependent:  At home she had maintained on Lantus 20 units subcutaneously at bedtime and insulin NovoLog 10 units with meals and sliding scale.    - A1d 5.8% 11/01/2019  - Lantus 10 daily + ssi for now  - POC sugars QID cml hs     History of paroxysmal atrial fibrillation, not on anticoagulation prior to admission and continue her prior to admission aspirin.      History of gastroesophageal reflux disease   We will continue her prior to admission famotidine.     Diet: Moderate Consistent CHO Diet    DVT Prophylaxis: Pneumatic Compression Devices  Rico Catheter: in place, indication: Other (Comment), Other (Comment)(chronic rico)  Code Status: Full Code      Disposition Plan   Expected discharge: 2-3 days pending abx plan and nephro recommendations  Entered: Santosh Valdes MD 11/21/2019, 1:00 PM       The patient's care was discussed with the Bedside Nurse and Patient.    Santosh Valdes MD  Hospitalist Service  [facility]    ______________________________________________________________________    Interval History   Seen and examined.  Doing much better now off BiPAP at the time that I see her-last 2 hours on room air.  Mentation seems to be nearing baseline she is calm and cooperative.  Denies fevers chills shortness of breath.    Data reviewed today: I reviewed all medications, new labs and imaging results over the last 24 hours. I personally reviewed no images or EKG's today.    Physical Exam   Vital Signs: Temp: 98  F (36.7  C) Temp src: Axillary BP: (!) 110/39(Simultaneous filing. User may be unaware of other data.) Pulse: 85 Heart Rate: 84(Simultaneous filing. User may be unaware of other data.) Resp: 10(Simultaneous filing. User may be unaware of other data.) SpO2: 98 %(Simultaneous filing. User may be unaware of other data.) O2 Device: None (Room air)(Simultaneous filing. User may be unaware of other data.)    Weight: 263 lbs 12.8 oz    Gen: NAD, obese, pleasant, more awake  than she was earlier apparently  HEENT: Normocephalic, EOMI, MMM  Resp: no crackles,  no wheezes, no increased work of resp  CV: S1S2 heard, reg rhythm, reg rate  Abdo: soft, nontender, nondistended, bowel sounds present  Ext: calves nontender, well perfused  Neuro: AAOx3, CN grossly intact, no facial asymmetry      Data   Recent Labs   Lab 11/21/19  0610 11/20/19  0600 11/19/19  1415 11/19/19  1327 11/19/19  1235   WBC 7.0 7.9  --  6.3  --    HGB 8.8* 8.8*  --  8.9*  --    MCV 97 96  --  96  --     155  --  155  --    INR  --   --  1.12  --   --     139  --   --  138   POTASSIUM 5.4* 5.3  --   --  5.3   CHLORIDE 117* 114*  --   --  113*   CO2 16* 20  --   --  21   BUN 60* 65*  --   --  58*   CR 3.56* 3.73*  --   --  3.88*   ANIONGAP 8 5  --   --  4   ROGER 7.9* 8.0*  --   --  8.0*   * 109*  --   --  204*   ALBUMIN 2.0*  --   --   --  2.0*   PROTTOTAL  --   --   --   --  6.5*   BILITOTAL  --   --   --   --  <0.1*   ALKPHOS  --   --   --   --  69   ALT  --   --   --   --  <6   AST  --   --   --   --  12     No results found for this or any previous visit (from the past 24 hour(s)).   01-Mar-2020

## 2020-03-12 NOTE — PROGRESS NOTES
"Clinic Care Coordination Contact    Follow Up Progress Note      Assessment: pro-active outreach call placed to patient. She completed advance care planning but  didn't understand to mail the clinic a copy. Spoke with  and he agrees to do so. Will discuss need for POLST with patient since she is homebound. She reports being excited and definitely less depressed as they are getting a new puppy likely with Sunday. Male \"Kamran Bear\" breed named Blaise. Home care nurse came out today. Getting gentamycin catheter flushes that are helping reduce patient getting UTI's which she is grateful for.    Goals addressed this encounter:   Goals Addressed                 This Visit's Progress      Medical (pt-stated)        #1-Goal Statement: I will reduce the number of UTI's I experience  Measure of Success: reduction in number of UTI's  Supportive Steps to Achieve: home care nurse to request orders for more frequent catheter changes and increased pericare per week  Barriers: resistant UTI's  Strengths: assistance as above  Date to Achieve By: 8-1-2020               Intervention/Education provided during outreach: NA     Outreach Frequency: monthly    Plan:   Will discuss POLST with PCP and contact patient with plan. Will continue to follow with monthly outreach calls to patient. She agrees with plan.     Porsha Khalil RN  [facility]-Care Coordination  [facility]  [facility]  [phone]          "

## 2020-03-13 NOTE — TELEPHONE ENCOUNTER
Reason for call:  Medication   If this is a refill request, has the caller requested the refill from the pharmacy already? N/A  Will the patient be using a [facility]? Yes  Name of the pharmacy and phone number for the current request: unsure which one they go to    Name of the medication requested: Pecid     Other request: Wants to know if [facility] pharmacy carry pecid and what amounts so they can get this through it.     Phone number to reach patient:  Cell number on file:    Telephone Information:   Mobile [phone]       Best Time:  Any     Can we leave a detailed message on this number?  YES    Travel screening: Not Applicable

## 2020-03-13 NOTE — TELEPHONE ENCOUNTER
Unable to find a current medication for acid reflux.  Called and spoke to the patient who said she is out of zofran.    Spoke with the pharmacist at [facility], who stated that the patient is out of refills, but the medication is in stock.  Prescription approved per [facility] Refill Protocol.  Sandi Long RN March 13, 2020 6:27 PM

## 2020-03-13 NOTE — TELEPHONE ENCOUNTER
Reason for Call:  Acid Controller pill    Detailed comments: Patient's  had called stating that the acid controller pill he usually gives patient is currently out of stock and would like to know if there is a certain brand or amount that provider can recommend him to get.      Phone Number Patient can be reached at: Home number on file [phone] (home)    Best Time: Anytime    Can we leave a detailed message on this number? YES    Call taken on 3/13/2020 at 2:47 PM by Nanda Horan

## 2020-03-14 NOTE — TELEPHONE ENCOUNTER
Medication reordered and sent to [facility] please let patient know    Klaudia MTZ

## 2020-03-16 NOTE — TELEPHONE ENCOUNTER
Spoke with the pharmacy who reports that the medication was back ordered, but has been received.  The patient is aware of the delay/refill. Sandi Long RN March 16, 2020 4:08 PM

## 2020-03-17 NOTE — PROGRESS NOTES
Clinic Care Coordination Contact    Follow Up Progress Note      Assessment: patient considering completing POLST per last discussion with RN CC. Reviewed with PCP who states she thought she had already completed one with patient. Chart reviewed and copy of POLST is scanned to chart.    Goals addressed this encounter:   Goals Addressed    None          Intervention/Education provided during outreach: reminded patient to place POLST on refrigerator     Outreach Frequency: monthly    Plan:   Called patient and left detailed message that POLST is completed.. mailed copy of POLST to home  Care Coordinator will follow up in approximately one month    Porsha Khalil RN  [facility]-Care Coordination  [facility]  [facility]  [phone]

## 2020-03-17 NOTE — PROGRESS NOTES
"Gem Jade is a 74 year old female who is being evaluated via a billable telephone visit.      The patient has been notified of following:     \"This telephone visit will be conducted via a call between you and your physician/provider. We have found that certain health care needs can be provided without the need for a physical exam.  This service lets us provide the care you need with a short phone conversation.  If a prescription is necessary we can send it directly to your pharmacy.  If lab work is needed we can place an order for that and you can then stop by our lab to have the test done at a later time.    If during the course of the call the physician/provider feels a telephone visit is not appropriate, you will not be charged for this service.\"       Gem Jade complains of    Chief Complaint   Patient presents with     RECHECK     Patient does have concerns regarding her health but doesn't remember at this time.   I have reviewed and updated the patient's Past Medical History, Social History, Family History and Medication List.    ALLERGIES  Atorvastatin calcium; Codeine; Darvocet [propoxyphene n-apap]; Hydromorphone; Levofloxacin; Morphine; Niaspan [niacin]; Percocet [oxycodone-acetaminophen]; and Vicodin [hydrocodone-acetaminophen]    Theodore Galindo (MA signature)    Additional provider notes:     Diabetes Follow-up       How often are you checking your blood sugar? three times daily    Blood sugar testing frequency justification:  Uncontrolled diabetes and Adjustment of medication(s)    What time of day are you checking your blood sugars (select all that apply)?  Before meals and At bedtime    Have you had any blood sugars above 200?  no below 150 usually     Have you had any blood sugars below 70?  No, 80's     What symptoms do you notice when your blood sugar is low?  Shaky, Weak and Confusion    What concerns do you have today about your diabetes? None     Do you have any of these symptoms? " (Select all that apply)  Burning in feet, but has improved with splitting gabapentin into 2 doses 300mg 2x daily rarely taking the extra 100mg      Have you had a diabetic eye exam in the last 12 months? No     Chronic Kidney Disease Follow-up  Recent labs show improvement, labs repeated today        Do you take any over the counter pain medicine?: no       How many servings of fruits and vegetables do you eat daily?  2-3    On average, how many sweetened beverages do you drink each day (Examples: soda, juice, sweet tea, etc.  Do NOT count diet or artificially sweetened beverages)?   0    How many days per week do you exercise enough to make your heart beat faster?NA bedbound      How many minutes a day do you exercise enough to make your heart beat faster? NA    How many days per week do you miss taking your medication? 0        Mental Health Follow up depression/anxiety     Status since last visit: improved, taking as directed medications     See PHQ-9 for current symptoms.  Other associated symptoms: None    Complicating factors: decline in kidney function but has stabilized    Significant life event:  No     Current substance abuse:  None  Anxiety or Panic symptoms:  No     Sleep - improved with prazosin, taking 2 at bedtime no more nightmares, one night had a issue dreams about the virsus     Appetite - baseline, eating 2 meals a day most days   Exercise - none, bed ridden      Smoking - no  Alcohol - no  Street drugs - no  Marijuana - no  Caffeine - occ    URINARY SX: none, no sx, regular tipped cath now, gentamycin flushes 2x weekly, no leaking changed every 3 weeks        Assessment/Plan:  (E11.42,  Z79.4) Type 2 diabetes mellitus with diabetic polyneuropathy, with long-term current use of insulin (H)  Comment: improved  Plan: discussed diet and movement     (E11.43,  K31.84) Diabetic gastroparesis (H)  Comment: improving   Plan: famotidine (PEPCID) 20 MG tablet        Continue famotidine reordered today       (F51.5) Nightmares  Comment: improved with prazocin   Plan: continue meds     (Z96.0) Chronic indwelling Johnson catheter  Comment: improved   Plan: continue gentamycin and cath changes     (N18.6) End stage chronic kidney disease (H)  Comment: labs improving Creatinine 2.95, GFR 15, immunocompromised    Plan: labs repeated today, discussed covid 19 and precautions to take     I have reviewed the note as documented above.  This accurately captures the substance of my conversation with the patient.  Klaudia Barcenas P      Phone call contact time  Call Started at 3:30  Call Ended at 4:00    Visit time 30   min with > than 50% of the time spent in counseling on: DM, kidney diease, covid 19 and risk as she is immunocompromised,        Marivel Barcenas, ALISON, APRN CNP

## 2020-03-17 NOTE — LETTER
[facility]  Complex Care Plan  About Me:    Patient Name:  Gem Shaver    YOB: 1945  Age:         74 year old   Josephine MRN:    [id number] Telephone Information:  Home Phone [phone]   Mobile [phone]       Address:  [place] [address] Email address:  [email or web address]      Emergency Contact(s)    Name Relationship Lgl Grd Work Phone Home Phone Mobile Phone   1. TAWANDA SHAVER Spouse No   [phone]   2. ANTONY VIVEROS* Daughter No   [phone]           Primary language:  English     needed? No   [facility]:  [phone] op. 1  Other communication barriers:    Preferred Method of Communication:  Mail  Current living arrangement:    Mobility Status/ Medical Equipment:      Health Maintenance  Health Maintenance Reviewed:      My Access Plan  Medical Emergency 911   Primary Clinic Line [facility] - [phone]   24 Hour Appointment Line [phone] or  [phone]) (toll-free)   24 Hour Nurse Line [phone] (toll-free)   Preferred Urgent Care     Preferred Hospital     Preferred Pharmacy [facility] - [place], [place] - [facility] [address] Behavioral Health Crisis Line The National Suicide Prevention Lifeline at 1-542.735.7082 or 911             My Care Team Members  Patient Care Team       Relationship Specialty Notifications Start End    Marivel Barcenas APRN CNP PCP - General Family Practice  5/24/13     Phone: [phone] Fax: [phone]         [address] [facility] [address]    Monico Heck MD MD Transplant  10/6/15     nephrology - [phone]    Phone: [phone] Fax: [phone]         [address] [facility] [address]    Ross Elizabeth MD MD Nephrology  5/9/17     Phone: [phone] Fax: [phone]         [address] [facility] [address]    Marivel Barcenas APRN CNP  Assigned PCP   7/8/18     Phone: [phone] Fax: [phone]         [address] [facility] [address]    Juan Antonio Luna, EMT Community Paramedic  Admissions 8/28/18     [facility] Home Care Nurse   5/7/19     provider couldn't remember nurse's name    Phone: [phone]         Conchita Toledo, [facility] Pharmacist Pharmacist  5/7/19     Phone: [phone]          [address] [facility] [address]    Kassandra Khalil, RN Lead Care Coordinator Primary Care - CC Admissions 7/22/19     Phone: [phone] Fax: [phone]        Aditi Becerra [facility] Health Worker  Admissions 3/12/20             My Care Plans  Self Management and Treatment Plan  Goals and (Comments)  Goals        General    Medical (pt-stated)     Notes - Note edited  3/12/2020  2:51 PM by Kassandra Khalil, RN    #1-Goal Statement: I will reduce the number of UTI's I experience  Measure of Success: reduction in number of UTI's  Supportive Steps to Achieve: home care nurse to request orders for more frequent catheter changes and increased pericare per week  Barriers: resistant UTI's  Strengths: assistance as above  Date to Achieve By: 8-1-2020        Other (pt-stated)     Notes - Note created  10/1/2019  8:48 AM by Kassandra Khalil, RN    2-Goal Statement: I want to be able to get my wheelchair into our van so my  and I can go out to dinner  Measure of Success: gets wheelchair into van and goes to dinner  Supportive Steps to Achieve:  bought new christiana lift  Barriers: bedbound  Strengths: wants to get out of bed   Date to Achieve By: 1-1-20  Patient expressed understanding of goal: yes                 Action Plans on File:            Depression          Advance Care Plans/Directives Type:        My Medical and Care Information  Problem List   Patient Active Problem List   Diagnosis     Kidney replaced by transplant     Primary localized osteoarthrosis, lower leg     Pulmonary  embolism and infarction (H)     Diabetes mellitus with background retinopathy (H)     Hyperlipidemia LDL goal <100     Diabetic gastroparesis (H)     Back pain     Compression fx, thoracic spine (H)     Anemia     Clostridium difficile diarrhea     Hydronephrosis     Recurrent UTI     Benign essential hypertension     Vancomycin resistant Enterococcus     Clostridium difficile colitis     Diabetic polyneuropathy associated with type 2 diabetes mellitus (H)     Chronic shoulder pain, unspecified laterality     Morbid obesity, unspecified obesity type (H)     Health Care Home     Paroxysmal A-fib (H)     Acute kidney injury (H)     Pancytopenia, acquired (H)     Obstructive sleep apnea syndrome     Complicated UTI (urinary tract infection)     Altered Mental Status, Probable Metabolic encephalopathy     Chronic indwelling Johnson catheter     Seizure disorder (H)     Dermatitis     Encephalopathy acute     Type 2 diabetes mellitus with diabetic polyneuropathy, with long-term current use of insulin (H)     Sepsis (H)     Overdose, accidental or unintentional, subsequent encounter     CKD (chronic kidney disease) stage 3, GFR 30-59 ml/min (H)     Paranoia (H)     Altered mental status     UTI (urinary tract infection)     End stage chronic kidney disease (H)      Current Medications and Allergies:  See printed Medication Report.    Care Coordination Start Date: No linked episodes   Frequency of Care Coordination:     Form Last Updated: 03/17/2020

## 2020-03-18 NOTE — TELEPHONE ENCOUNTER
ISSUE: Creatinine 3.77 on 3/17/20. Patient has [facility].    PLAN/OUTCOME: Called [facility] Case Manager. She states that patient is not exhibiting any of her typical UTI symptoms such as lethargy or fever. She is alert and oriented and in good spirits ( is buying a puppy). Berenice states she changes the patient's catheter monthly and flushes twice/week with Gentamycin. Patient always has sediment. Next lab draw is 20. She states patient is aware her kidney if failing and has her advance directives all filled out including her  planned.     Spoke with Keshawn. Patient perhaps has a touch of confusion per him because he feels she is tired; however none of the usual UTI symptoms. Plan to repeat labs in 2 weeks.

## 2020-03-18 NOTE — LETTER
PHYSICIAN ORDERS      DATE & TIME ISSUED: 2020 12:54 PM  PATIENT NAME: Gem Jade   : 1945     [facility] MR# [if applicable]: [id number]     DIAGNOSIS / ICD-10 CODE: Kidney Transplanted (Z94.0);   Aftercare following kidney transplant (Z48.22)    Please draw the following labs the week of 2020:  -BMP  -CBC  -Tacrolimus Level (12 hour level-Record last dose date and time)    Any questions please call: [phone]. Fax results to [phone].        .

## 2020-03-18 NOTE — TELEPHONE ENCOUNTER
Message   Received: Today   Message Contents   Ross Elizabeth MD Blaisdell, Christin Rebecca, RN               Slight worsening kidney function and may ensure patient doesn't have a urinary tract infection.

## 2020-03-19 NOTE — TELEPHONE ENCOUNTER
Home Health Certification and Plan of Care faxed to [phone] at Home  Care [facility] Care.  Cecilia MCRAE

## 2020-03-25 NOTE — TELEPHONE ENCOUNTER
Prescription approved per [facility] Refill Protocol.    Taqueria Araujo RN on 3/25/2020 at 11:43 AM

## 2020-03-25 NOTE — TELEPHONE ENCOUNTER
Per Medicare part B guidelines this script  six months after the written date so we will need a new script in order to fill these for this patient.

## 2020-03-26 NOTE — TELEPHONE ENCOUNTER
Berenice requests lab requisition order instead of Epic lab orders. Letter with orders sent to  [facility] [phone]

## 2020-04-01 NOTE — TELEPHONE ENCOUNTER
Letter for health care directive sent out to patient to complete page 2 of 4. Patient was given instruction of what to complete and where to send the form back to.     Theodore Galindo MA

## 2020-04-14 NOTE — TELEPHONE ENCOUNTER
Spoke to [facility]  iris [phone]. Labs are complete and delivered for processing.    Aditi Jean-Baptiste RN on 4/14/2020 at 1:29 PM

## 2020-04-14 NOTE — TELEPHONE ENCOUNTER
Patient is having some increased confusion per her [facility] RN, labs ordered to be done at visit today 4/14.  Please let her [facility] RN know     Klaudia MTZ

## 2020-04-15 NOTE — TELEPHONE ENCOUNTER
prednisone (need new Rx for Medicare B), rx from December was written for #90 plus 3 refills but per Medicare we can only fill it as #30 plus 3 refills so rx is exhausted so we do need a new Rx sent  Thank you!  Conchita Mahoney Sue  [facility]

## 2020-04-15 NOTE — PATIENT INSTRUCTIONS
I will have our PharmD call you and review insulin use in light of current situation     Labs reviewed and we are waiting to hear back from your transplant team     Please have your [facility] RN call me tomorow after your visit

## 2020-04-15 NOTE — PROGRESS NOTES
Patient was unable to do phone visit or video visit due to patient's sleepiness, has had increased agitation which started Friday afternoon, mental confusion, slept Friday night through Sunday night, took pills ate very little, drank water,  Monday woke up and ate but refused to let PCA wash her, wanted to fire the RN, more agitated,   called the RN who notified me and labs ordered which have been basically baseline except Hgb dropping, seems like she is better now but did not eat yesterday or today, has been able to drink water and take her pills,sleeping off and on, less agitation the past 2 days       Visit conducted with     Diabetes- RBG under 200, none below 70, if she is not eating she gets no insulin if she eats then adjusts insulin according to what she eats and then also takes lantus but adjust amount,  checking sugars off and on, discussed having PharmD check in on insulin dosing when not eating    Urinary issues- no leaking and urine clear yellow, being changed every 3 weeks, and gentamycin 2x a week, [facility] RN coming tomorrow      ASSESSMENT / PLAN:  (R41.0) Mental confusion  (primary encounter diagnosis)  Comment: slowly improving in light of end stage kidney diease   Plan: monitor, call 911 if unresponsive, will review UC when available and make decision on treatment, labs sent to transplant team, patient prefers not to go to the hospital      (N39.0) Recurrent UTI  Comment: stable for 4 months,   Plan: labs done and reviewed, no indication of systemic infection, will wait for final UC results to treat or not    (E11.65,  Z79.4) Type 2 diabetes mellitus with hyperglycemia, with long-term current use of insulin (H)  Comment:  limiting insulin based on intake, reviewed his process today   Plan: referred to Pharmd for phone consult, continue current regime now, start checking sugars 2-3x daily and base insulin dosing on RBG     Patient Instructions   I will have our PharmD call  you and review insulin use in light of current situation     Labs reviewed and we are waiting to hear back from your transplant team     Please have your [facility] RN call me tomorow after your visit       Klaudia MTZ  Phone consult with  30 minutes

## 2020-04-15 NOTE — TELEPHONE ENCOUNTER
Requested Prescriptions   Pending Prescriptions Disp Refills     predniSONE (DELTASONE) 5 MG tablet 90 tablet 3     Sig: Take 1 tablet (5 mg) by mouth daily       There is no refill protocol information for this order              Last Written Prescription Date:  12/17/19  Last Fill Quantity: 90,   # refills: 3  Last Office Visit: 3/17/20    (N18.6) End stage chronic kidney disease (H)  Comment: labs improving Creatinine 2.95, GFR 15, immunocompromised    Plan: labs repeated today, discussed covid 19 and precautions to take     Notes recorded by Marivel Barcenas, ANTHONY CNP on 4/14/2020 at 3:45 PM CDT   Patient has end stage kidney diease, one transplanted kidney, labs are baseline for her, she would like to avoid any further hospitalizations, would not recommend treatment at this time but will send to her transplant team for feedback, I will check in with her tomorrow      Future Office visit:       Refills on file so writer will take written order and send 30 day supply with refills to pharmacy so order complies with insurance    Signed Prescriptions:                        Disp   Refills    predniSONE (DELTASONE) 5 MG tablet         30 tab*7        Sig: Take 1 tablet (5 mg) by mouth daily  Authorizing Provider: MARIVEL BARCENAS  Ordering User: ARABELLA SHELLEY

## 2020-04-20 NOTE — TELEPHONE ENCOUNTER
Labs reviewed, UC growing out Strep B and staph, Will treat at this time with amoxicillin 250mg 2x daily given her current kidney function, sent to  mail order pharmacy,  and increase her gentamicin flushes to daily for one week, please let her  HC nurse know and the patient    Klaudia MTZ

## 2020-04-22 NOTE — PROGRESS NOTES
MTM ENCOUNTER  SUBJECTIVE/OBJECTIVE:                           Gem Jade is a 74 year old female called for a follow-up visit. She was referred to me from Marivel Barcenas.  Today's visit is a follow-up MTM visit from 19.  This is the first visit of the calendar year.    Patient consented to a telehealth visit: yes    Chief Complaint: headaches and vivid dreams.  Healthcare goals: Would like a palliative approach to care. No longer wants to be admitted to the hospital for treatment.    Tobacco:  reports that she quit smoking about 32 years ago. She quit after 17.00 years of use. She has never used smokeless tobacco.  Alcohol: not currently using    Medication Adherence/Access:  no issues reported  Patient uses pill box(es) set up by RN.    Headache: Wondering what she can take for headache. Last couple days having a headache when she wakes up. Muscles feel very tense and stress has been increased for the past week. Continues to have very vivid dreams which are bothersome to her. Dreams tend to be worse with increased stress.  Stress management: watching relaxing TV, connecting with her spouse. Some connecting over phone/video to family and friends. Hasn't been doing any audiobooks.     UTI: just started amoxicillin this morning. Describes ongoing CNS symptoms, confusion. Had a fever on and off, glucose has been OK, sleeping x3-4 days straight but able to wake up and take her pills. For the last few days she has been feeling a lot better.    Type 2 Diabetes:  Pt currently taking Lantus 20u daily and Novolog 10u with meals + sliding scale. Pt is not experiencing side effects. On days when she is not eating because of illness, she is not getting any insulin at all.   SMB time(s) daily. Ranges (patient reported): 300-400s the last couple days, had been without insulin when she was ill and not eating. Prior to this illness, glucose has been very well controlled.   Symptoms of low blood sugar?  none  Symptoms of high blood sugar? none  Eye exam: due  Foot exam: due  Diet/Exercise: Eating dinner every day, largest meal. Also eats a small amount in the morning, example peanut butter toast, cottage cheese with dressing.  Aspirin: Taking 81mg daily and denies side effects  Statin: Yes: pravastatin   ACEi/ARB: No.   Urine Albumin:   Lab Results   Component Value Date    UMALCR 6,776.56 (H) 04/26/2017        Today's Vitals: There were no vitals taken for this visit. - phone visit      ASSESSMENT:                            Medication Adherence: excellent, no issues identified    Headache: needs improvement. OK to take apap 1000mg for headache. Extensive discussion on ways to improve stress management. Likely having more vivid dreams as an effect of declining renal function.     UTI: symptoms improving and pt started abx today, will continue to monitor    Diabetes: needs improvement. A1c at goal <8%. Previously glucose had been within goal ranges, however elevated with recent infection and skipping doses of insulin.  Discussed sick day management. Recommend continuing to administer Lantus at half the usual dose if glucose is >150 at bedtime. If <150, hold Lantus.  Encouraged increased SMBG to BID with pill administration when pt is sick and use of Novolog sliding scale if needed. Expect with 2-3 days of antibiotic use and regular insulin, hyperglycemia should resolve.       PLAN:                            1. OK to take acetaminophen 1000mg PRN headache  2. Encouraged pt to increase self-care activities for stress reduction  3. Increase glucose testing when sick to BID at the time of pill administration.  If glucose in the evening when due for Lantus is 150 or higher, administer Lantus 10 units while fasting. Continue Lantus 20u daily when diet is normal.      I spent 40 minutes with this patient today. All changes were made via collaborative practice agreement with Marivel Barcenas. A copy of the visit  note was provided to the patient's primary care provider.    Will follow up in 1-2 months as needed.    The patient was sent via [employer] a summary of these recommendations.     Conchita Toledo, VivianD, BCACP

## 2020-04-22 NOTE — PATIENT INSTRUCTIONS
Recommendations from today's MTM visit:                                                      Congrats on getting Blaise - he is just the cutest!    1. OK to take Tylenol (acetaminophen) 1000mg as needed for headache    2. We talked about increasing your self-care when stress is higher.  You were interested in watching more Hall[employer] movies and listening to books (with lighter content!)    3. For diabetes - when Shantelle is sick, increase glucose testing to twice a day at the times of pill taking.  If sugar is 150 or higher at night, OK to give a half-dose of Lantus at 10 units when she is fasting.  Use the sugar checks to give Novolog as needed with the sliding scale only (do not add the 10 units because she is not eating).      Please call if sugars are staying high or needing more help figuring out doses.     It was great to speak with you today.  I value your experience and would be very thankful for your time with providing feedback on our clinic survey. You may receive a survey via email or text message in the next few days.     Next MTM visit: 1-2 months as needed    To schedule another MTM appointment, please call the clinic directly or you may call the MTM scheduling line at [phone] or toll-free at [phone].     My Clinical Pharmacist's contact information:                                                      It was a pleasure talking with you today!  Please feel free to contact me with any questions or concerns you have.      Conchita Toledo, PharmD, BCACP

## 2020-04-23 NOTE — PROGRESS NOTES
Clinic Care Coordination Contact  [facility]/Voicemail    Referral Source: PCP  Clinical Data: Care Coordinator Pro-active monthly Outreach  Outreach attempted x 1.  Left message on patient's voicemail with call back information and requested return call.  Plan: Care Coordinator will try to reach patient again in 10 business days.     Porsha Khalil RN  [facility] Primary Care-Care Coordination  [facility]  [facility]  [phone]

## 2020-04-24 NOTE — PROGRESS NOTES
Clinic Care Coordination Contact  Care Team Conversations    Received voice mail message from patient's [facility] Nurse , Berenice, asking for PCP to order gloves for wound and cath care. Telephone encounter sent to clinic triage nurse team for processing. Called Berenice back and left message asking that she call me back with update regarding patient when able. Will await return call.    Porsha Khalil RN  [facility] Primary Care-Care Coordination  [facility]  [facility]  [phone]

## 2020-04-24 NOTE — PROGRESS NOTES
"Clinic Care Coordination Contact  Care Team Conversations    Returned call to Berenice patient's home care RN case manager who is returning my call for update regarding patient. PCP prescribed amoxicillin for UTI 4/20. Patient received the medication via home delivery late 4/21. Did take one dose that evening. RN did usual gentamycin flush routine Tuesday and Thursday then today saw a note in EMR stating PCP added direction to give flush daily starting 4/23.  Keshawn is trained to do the flushes and will continue to do them over the weekend until the home care nurse returns Tuesday. Patient doing well. \"Back to her old normal self\" per nurse. No confusion, fevers. Eating well. Urine draining normally from Johnson catheter. Nurse did review with patient and wife that they can consider hospice services in the future should she decline but it was decided that they would hold off for now since things are going reasonably well per patient and . They are open to discussion again if needed. NORMAI for PCP.    Porsha Khalil RN  [facility] Primary Care-Care Coordination  [facility]  [facility]  [phone]             "

## 2020-04-24 NOTE — TELEPHONE ENCOUNTER
See RN CC note. No need for prescription for gloves per home care nurse. Nurse confirmed patient's insurance will not cover this so they paid cash through DME company and will have them delivered.     Porsha Khalil RN  [facility]-Care Coordination  [facility]  [facility]  [phone]

## 2020-04-24 NOTE — TELEPHONE ENCOUNTER
Routing to provider for order. Fax to [facility] [employer] at fax [phone].    Aditi Jean-Baptiste RN on 4/24/2020 at 11:55 AM

## 2020-04-24 NOTE — TELEPHONE ENCOUNTER
Received voice mail from home care nurse Berenice, asking for PCP to refill gloves for wound care and cath care 3 gloves per day for 30 days with one year refils to [facility]. She left a fax number but it is unclear on voice mail. ? [phone]. Requested routed to clinic triage nurse team for processing request    Porsha Khalil RN  [facility] Primary Care-Care Coordination  [facility]-Integrated Primary Care  [facility]  [phone]

## 2020-04-28 NOTE — PROGRESS NOTES
Clinic Care Coordination Contact    Situation: Patient chart reviewed by care coordinator.    Background: patient with intermittent UTI/sepsis due to chronic indwelling Johnson catheter placement causing encephalopathy     Assessment: Home Care RN is managing Johnson catheter cares, gentamycin flushes and monitoring for signs of UTI twice weekly for patient    Plan/Recommendations: will disenroll patient from care coordination services at this time as home care nurse is taking lead/managing patient's health goal of reduced frequency of UTI infections.     Porsha Khalil RN  [facility] Primary Care-Care Coordination  [facility]-Integrated Primary Care  [facility]  [phone]

## 2020-04-29 NOTE — TELEPHONE ENCOUNTER
Pharmacy: [facility] - [place], [place] - [address]    P: [phone]   F: [phone]     Requested Prescriptions   Pending Prescriptions Disp Refills     gentamicin irrigation 1 Bottle 1     Sig: Irrigate rico cath 2x a week  60ml of 400mg of gentamicinin [id number] of 0.9% Sodium Chloride   Last Written Prescription Date:  2/25/2020  Last Fill Quantity: 1 Bottle,  # refills: 1   Last office visit: 4/15/2020 with prescribing provider:     Future Office Visit:      There is no refill protocol information for this order

## 2020-04-30 NOTE — TELEPHONE ENCOUNTER
Faxed forms to [phone].    Documents sent to abstract for scanning.    Theodore Galindo MA

## 2020-04-30 NOTE — TELEPHONE ENCOUNTER
Faxed forms to [phone].    Documents sent to abstract for scanning.    Theodore Galindo MA

## 2020-05-04 NOTE — TELEPHONE ENCOUNTER
[facility] pharmacy faxed another request to clinic.    Yohana Sebree  Patient Representative  [facility]

## 2020-05-04 NOTE — TELEPHONE ENCOUNTER
Refused Prescriptions:                       Disp   Refills    gentamicin irrigation                      0.1 Huber*0        Sig: Irrigate rico cath 2x a week  60ml of 400mg of           gentamicinin [id number] of 0.9% Sodium Chloride  Refused By: ARABELLA SHELLEY  Reason for Refusal: Duplicate

## 2020-05-05 NOTE — TELEPHONE ENCOUNTER
Patient was treated on 4/20 for a UTI, please call the patient and her  and get an update, should be finished with antibiotics and back to gentamycin flushes 2x a week     Klaudia MTZ

## 2020-05-05 NOTE — TELEPHONE ENCOUNTER
Called Shantelle. She finished the antibiotic about  3-4 days ago.  States she feels fine.  No signs of the bladder infection returning.  No fever.  She is back to the Gentamycin flushes 2 times weekly.      Routing to provider as an FYI. Please close encounter after reading.     Amanda Lechuga RN on 5/5/2020 at 1:17 PM

## 2020-05-07 NOTE — TELEPHONE ENCOUNTER
Routing refill request to provider for review/approval because:    Diphenoxylate-Atropine(Lomotil) 2.5-0.025 mg  Drug not on the [facility] refill protocol     Amanda Lechuga RN on 5/7/2020 at 2:22 PM

## 2020-05-12 NOTE — PROGRESS NOTES
"Gem Jade is a 74 year old female who is being evaluated via a billable phone visit.  Patient and  were unable to complete a video visit so visit was converted to a phone visit     The patient has been notified of following:     \"This phone visit will be conducted via a call between you and your physician/provider. We have found that certain health care needs can be provided without the need for an in-person physical exam.  This service lets us provide the care you need with a video conversation.  If a prescription is necessary we can send it directly to your pharmacy.  If lab work is needed we can place an order for that and you can then stop by our lab to have the test done at a later time.    Phone visits are billed at different rates depending on your insurance coverage.  Please reach out to your insurance provider with any questions.    If during the course of the call the physician/provider feels a video visit is not appropriate, you will not be charged for this service.\"    Patient has given verbal consent for Video visit? Yes    How would you like to obtain your AVS? MyChart    Will anyone else be joining your video visit? Yes:  . How would they like to receive their invitation? Send to e-mail at: [email or web address]        Subjective     Gem Jade is a 74 year old female who presents today via video visit for the following health issues:    HPI   Medication request, done earlier  Diabetes Follow-up       How often are you checking your blood sugar? three times daily    Blood sugar testing frequency justification:  Uncontrolled diabetes and Adjustment of medication(s), missed one novolog dose and took it late     What time of day are you checking your blood sugars (select all that apply)?  Before meals and At bedtime    Have you had any blood sugars above 200?  yes, pm 267, 367, fasting sugars 158, 101, 306, 262, all from this week     Have you had any blood sugars below 70?  No, one " 72,    What symptoms do you notice when your blood sugar is low?  Shaky, Weak and Confusion    What concerns do you have today about your diabetes? None     Do you have any of these symptoms? (Select all that apply)  Burning in feet, leg pain currently at baseline, except for last week, taking gabapentin 300mg 2x daily and had to use the 100mg PRN dose 2x day       Have you had a diabetic eye exam in the last 12 months? No     Chronic Kidney Disease Follow-up        Do you take any over the counter pain medicine?: no       How many servings of fruits and vegetables do you eat daily?  2-3    On average, how many sweetened beverages do you drink each day (Examples: soda, juice, sweet tea, etc.  Do NOT count diet or artificially sweetened beverages)?   0    How many days per week do you exercise enough to make your heart beat faster?NA bedbound      How many minutes a day do you exercise enough to make your heart beat faster? NA    How many days per week do you miss taking your medication? 0        Mental Health Follow up depression/anxiety     Status since last visit: confusion increased today, happens at least 1x a week, but other than that feeling well, still doing power sleeping off and on sleeping all day, generally not eating but takes her pills. Some hallucinations      See PHQ-9 for current symptoms.  Other associated symptoms: None    Complicating factors: decline in kidney function but has stabilized    Significant life event:  No     Current substance abuse:  None  Anxiety or Panic symptoms:  No     Sleep - improved with prazosin, taking 2 at bedtime, occ nightmares     Appetite - baseline, eating 2 meals a day most days, except days she sleeps more   Exercise - none, bed ridden      Smoking - no  Alcohol - no  Street drugs - no  Marijuana - no  Caffeine - occ     URINARY SX: none, no sx, regular tipped cath now, gentamycin flushes 2x weekly, cath changed yesterday and had a little bleeding, generally clear       Hypertension Follow-up      Do you check your blood pressure regularly outside of the clinic? No     Are you following a low salt diet? No    Are your blood pressures ever more than 140 on the top number (systolic) OR more   than 90 on the bottom number (diastolic), for example 140/90? Not checking right now, will ask [facility] RN to check            Phone visit 4:00pm      Recent Labs   Lab Test 04/14/20  1230 03/31/20  1230 03/17/20  1230  11/01/19  0920  08/07/19  0855  07/01/19  1208  06/03/19  0730  04/24/19  1258  12/05/16  1210  02/05/15  1250  05/16/13  1240   A1C  --   --   --   --  5.8*  --  6.8*  --   --   --   --   --  6.2*   < > 7.4*   < > 6.9*   < > 7.2*   LDL  --   --   --   --   --   --   --   --   --   --   --   --   --   --  64  --  18  --  149*   HDL  --   --   --   --   --   --   --   --   --   --   --   --   --   --  46*  --  43*  --  42*   TRIG  --   --   --   --   --   --   --   --   --   --   --   --   --   --  272*  --  223*  --  262*   ALT 17 13 16   < >  --    < >  --    < >  --    < >  --    < > 22   < >  --    < >  --    < >  --    CR 3.64* 3.57* 3.77*   < > 2.50*   < > 2.07*   < > 1.68*   < >  --    < > 1.78*   < >  --    < > 0.67   < > 0.70   GFRESTIMATED 12* 12* 11*   < > 18*   < > 23*   < > 30*   < >  --    < > 28*   < >  --    < > 87   < > 84   GFRESTBLACK 14* 14* 13*   < > 21*   < > 27*   < > 35*   < >  --    < > 32*   < >  --    < > >90   GFR Calc     < > >90   POTASSIUM 5.3 4.8 5.2   < > 4.1   < > 4.5   < > 4.5   < >  --    < > 4.5   < >  --    < > 4.3   < > 3.9   TSH  --   --   --   --   --   --   --   --  2.43  --  1.28  --   --    < >  --    < >  --    < >  --     < > = values in this interval not displayed.      BP Readings from Last 3 Encounters:   11/26/19 113/56   11/19/19 (!) (P) 149/69   11/15/19 (!) 141/30    Wt Readings from Last 3 Encounters:   11/26/19 108.6 kg (239 lb 8 oz)   11/01/19 103 kg (227 lb 1.2 oz)   08/28/19 103.9 kg (229 lb)           "  Reviewed and updated as needed this visit by Provider         Review of Systems   Constitutional, HEENT, cardiovascular, pulmonary, gi and gu systems are negative, except as otherwise noted.      Objective    There were no vitals taken for this visit.  Estimated body mass index is 38.66 kg/m  as calculated from the following:    Height as of 11/19/19: 1.676 m (5' 6\").    Weight as of 11/26/19: 108.6 kg (239 lb 8 oz).  Physical Exam     GENERAL:  Alert, struggleto find the words to answer some questions and mild distress  RESP: No audible wheeze, cough,   PSYCH: concentration poor, confused, disoriented, affect normal/bright and anxious      Diagnostic Test Results:  Labs reviewed in Epic  No results found for any visits on 05/13/20.        Assessment & Plan     (I10) Benign essential hypertension  (primary encounter diagnosis)  Comment: no BP's recorded,   Plan: will have [facility] RN check and call with lows     (E11.65,  Z79.4) Type 2 diabetes mellitus with hyperglycemia, with long-term current use of insulin (H)  Comment: Baseline, slightly high, adjusting insulin based on food intake    Plan: reviewed insulin taking see PharmD note, discussed diet      (N18.6) End stage chronic kidney disease (H)  Comment: baseline  Plan: labs will be done for transplant, discussed last lab results    (G93.41) Altered Mental Status, Probable Metabolic encephalopathy  Comment: increase in incidents, happening one time a week   Plan: discussed labs, when to notify the clinic, discussed palliative care      BMI:   Estimated body mass index is 38.66 kg/m  as calculated from the following:    Height as of 11/19/19: 1.676 m (5' 6\").    Weight as of 11/26/19: 108.6 kg (239 lb 8 oz).         Patient Instructions   Continue meds as directed no med changes today    I will have your [facility] RN check your BP at your visits     We will try a video visit for your next visit           Marivel Barcenas, ALISON, APRN [facility] " [facility] C[facility]    Visit time 60   min with > than 50% of the time spent in counseling on: DM, MH, CKD,  Start time 3:30pm,  End time 4:30pm              Marivel Barcenas NP, APRN CNP

## 2020-05-13 NOTE — TELEPHONE ENCOUNTER
TC to the pharmacy and verbal order given for the gentamicin flushes, they never received the faxed print out.    Klaudia MTZ

## 2020-05-13 NOTE — PROGRESS NOTES
"MTM ENCOUNTER  SUBJECTIVE/OBJECTIVE:                           Gem Jade is a 74 year old female called for a follow-up visit. She was referred to me from Marivel Barcenas. Today's visit is a co-visit with Klaudia Barcenas. Her  Keshawn was also on the phone for the visit and provided much of the information. Today's visit is a follow-up MTM visit from 20.     Patient consented to a telehealth visit: yes  Telemedicine Visit Details  Type of service:  Telephone visit  Start Time: 4:05  End Time: 4:40  Originating Location (pt. Location): Home  Distant Location (provider location):  [facility]  Mode of Communication:  Telephone    Chief complaint: \"not having a good day\"  Healthcare goals: Would like a palliative approach to care. No longer wants to be admitted to the hospital for treatment.    Tobacco:  reports that she quit smoking about 32 years ago. She quit after 17.00 years of use. She has never used smokeless tobacco.  Alcohol: not currently using    Medication Adherence/Access:  no issues reported  Patient uses pill box(es) set up by [facility].    CKD/s/p kidney transplant: currently taking prednisone 5mg daily, tacrolimus 2mg BID.   reports that she is having a day of increased agitation and confusion, which typically occurs about once a week.  Unable to answer many of the questions adequately during the visit today. She also reports days when she will sleep much of the day, only awakening for medications. Overall, both the patient and her  feel that she is doing rather well and happy with current healthcare.  3/31 Tac level 4.1 (goal 4-6)   SCr 3.64 (stable)  Due for labs tomorrow, [facility] will draw    Type 2 Diabetes:  Pt currently taking Lantus 20u daily and Novolog 10u with meals + sliding scale. Pt is not experiencing side effects. On days when she is not eating, giving 10u Lantus which seems to work well.   SMB time(s) daily. " Ranges (patient reported):   Fastin, 101, 306, 262  Before dinner 367 (forgot Novolog), 318  After dinner: 267 last night  No hypoglycemia   Symptoms of low blood sugar? none  Symptoms of high blood sugar? none  Eye exam: due  Foot exam: due  Diet/Exercise: Eating dinner every day, largest meal. Also eats a small amount in the morning, example peanut butter toast, cottage cheese with dressing.  Aspirin: Taking 81mg daily and denies side effects  Statin: Yes: pravastatin   ACEi/ARB: No.   Urine Albumin:   Lab Results   Component Value Date    UMALCR 6,776.56 (H) 2017      Lab Results   Component Value Date    A1C 5.8 2019    A1C 6.8 2019    A1C 6.2 2019    A1C 7.3 2019    A1C 8.2 2018     Neuropathy: Currently taking gabapentin 300mg BID and extra 100mg PRN. Pain has been worse this past week and needed to take the additional 100mg dose several times. She reports doing ok today.     GERD: currently taking famotidine 20mg BID; working well and less expensive for her to get through the pharmacy instead of otc.    Hypertension: Current medications include amlodipine 5mg daily, labetalol 300mg BID, prazosin 2mg HS.  Patient has BP monitored by [facility] but doesn't think she's been checking it recently.  Patient reports no current medication side effects.     Vitamin D deficiency: currently taking vit D 2000u BID without problems.   Lab Results   Component Value Date    VITDT 29 2020    VITDT 32 2018    VITDT 28 2017    VITDT 57 2016    VITDT 32 2013       Today's Vitals: There were no vitals taken for this visit. - phone call      ASSESSMENT:                            Medication Adherence: excellent, no issues identified    CKD/s/p kidney transplant: stable. Verified current dosing of medications and taking correctly.  Labs followed by transplant team and will be rechecked tomorrow as planned.     Type 2 Diabetes: stable. A1c at goal <8%. SMBG generally  within goal range  fasting. They will continue monitoring recent elevations in glucose levels and report to clinic if noting persistently elevated glucose levels. Continue Novolog correction scale and Lantus at current doses.    Neuropathy: stable using current doses of gabapentin.  Difficult to calculate an accurate CrCl due to body habitus and low muscle mass as she is bed bound. Suspect CrCl may be <15ml/min, which would indicate a lower max dose of gabapentin at 300mg/day, however pt has not tolerated lower dosing of gabapentin due to severe neuropathic pain.  At previous discussions, she has been agreeable to continue taking her current dose and accepting of the possible risks of higher than recommended gabapentin levels.     GERD: stable    Hypertension: no recent readings available for review. Encouraged pt to ask for [facility]CRN to restart BP monitoring at visits.    Vitamin D deficiency: continue supplement    PLAN:                            1. Pt will ask [facility] to restart BP checks  2. Continue glucose monitoring and call into clinic if glucoses are consistently >300    I spent 30 minutes with this patient today. All changes were made via collaborative practice agreement with Marivel Barcenas. A copy of the visit note was provided to the patient's primary care provider.    Will follow up in 1 month.    The patient was sent via [employer] a summary of these recommendations.     Conchita Toledo, VivianD, BCACP

## 2020-05-14 NOTE — TELEPHONE ENCOUNTER
Requested Prescriptions   Pending Prescriptions Disp Refills     sodium bicarbonate 650 MG tablet 120 tablet 0     Sig: TAKE TWO TABLETS BY MOUTH TWICE A DAY       There is no refill protocol information for this order        pharmacy requesting refill    Last refilled on 4/9/20    Pt last seen on 5/13/20  Next appt scheduled for none    Will facilitate refill.    Aditi Jean-Baptiste RN on 5/14/2020 at 9:30 AM

## 2020-05-14 NOTE — TELEPHONE ENCOUNTER
Never received rx for Levothyroxine 50MCG sent on 5/13. Please resend      Thank you  Shanita Marmolejo [facility]

## 2020-05-15 NOTE — TELEPHONE ENCOUNTER
ISSUE:   Tacrolimus IR level 3.2 on 5/14/20 at 1230 pm, goal 4-6, dose 2 mg in the morning and 1 mg in the evening.  Last dose reported as 5/14/20 1200 am.     PLAN:   Please call patient and confirm this was an accurate 12-hour trough. Verify Tacrolimus IR dose 2 mg in the morning and 1 mg in the evening. Confirm no new medications or illness. Confirm no missed doses. If accurate trough and accurate dose, increase Tacrolimus IR dose to 2 mg BID and repeat labs in 2 week.     OUTCOME:   Spoke with patient's , Keshawn. He confirms accurate trough level and current dose 2 mg at noon and 1 mg at midnight. He confirmed dose change to 2 mg BID and to repeat labs in 2 weeks. Patient Orders sent to preferred pharmacy for dose change and lab for repeat labs. Patient voiced understanding of plan.

## 2020-05-15 NOTE — LETTER
PHYSICIAN ORDERS      DATE & TIME ISSUED: May 15, 2020 1:41 PM  PATIENT NAME: Gem Jade   : 1945     [facility] MR# [if applicable]: [id number]     DIAGNOSIS / ICD-10 CODE: Kidney Transplanted (Z94.0);   Aftercare following kidney transplant (Z48.22)    Please draw the following labs in approximately 2 weeks:  -BMP  -CBC w/platelets  -Tacrolimus Level (12 hour level-Record last dose date and time)    Any questions please call: [phone]. Fax results to [phone].        .

## 2020-05-15 NOTE — TELEPHONE ENCOUNTER
Prior Authorization Specialty Medication Request    Medication/Dose: Tacrolimus   ICD code (if different than what is on RX):  Z94.0  Previously Tried and Failed:      Important Lab Values:   Rationale:     Insurance Name:   Insurance ID:   Insurance Phone Number:     Pharmacy Information (if different than what is on RX)  Name:    Phone:

## 2020-05-19 NOTE — PATIENT INSTRUCTIONS
Continue meds as directed no med changes today    I will have your [facility] RN check your BP at your visits     We will try a video visit for your next visit

## 2020-05-29 NOTE — LETTER
PHYSICIAN ORDERS      DATE & TIME ISSUED: 2020 3:41 PM  PATIENT NAME: Gem Jade   : 1945     [facility] MR# [if applicable]: [id number]     DIAGNOSIS / ICD-10 CODE: Kidney Transplanted (Z94.0);   Aftercare following kidney transplant (Z48.22)    Please draw the following labs in approximately 2 weeks:  -BMP  -CBC w/platelets  -Tacrolimus Level (12 hour level-Record last dose date and time)    Any questions please call: [phone]. Fax results to [phone].        .

## 2020-05-29 NOTE — TELEPHONE ENCOUNTER
ISSUE:   Tacrolimus IR level 3.3 on 5/28/20, goal 4-6, dose 2 mg BID.    Potassium level 5.6. Reference range 3.4-5.3.    PLAN:   Please call patient and confirm this was an accurate 12-hour trough. Verify Tacrolimus IR dose 2 mg BID. Confirm no new medications or illness. Confirm no missed doses. If accurate trough and accurate dose, increase Tacrolimus IR dose to 2 mg in the morning and 3 mg in the evening and repeat labs in 2 weeks    Please review dietary restrictions on potassium; limit to 2 grams daily.     Encourage a strict low potassium diet, about 2000 mg per day. Encourage use of nutrition label reading.     Foods to avoid include: Avocado, banana, potatoes, orange juice, canned foods. Low potassium foods include: apples, pasta, berries, mixed vegetables.     Please have pt repeat BMP in 2 weeks    Phylicia Damian RN, BSN  Solid Organ Transplant, Post Kidney and Pancreas  Transplant Care Coordinator  [phone] option 5

## 2020-06-01 NOTE — TELEPHONE ENCOUNTER
[employer] message sent to patient regarding:  Tacrolimus IR level 3.3 on 5/28/20, goal 4-6, dose 2 mg BID.     Potassium level 5.6. Reference range 3.4-5.3.     PLAN:   Please call patient and confirm this was an accurate 12-hour trough. Verify Tacrolimus IR dose 2 mg BID. Confirm no new medications or illness. Confirm no missed doses. If accurate trough and accurate dose, increase Tacrolimus IR dose to 2 mg in the morning and 3 mg in the evening and repeat labs in 2 weeks     Please review dietary restrictions on potassium; limit to 2 grams daily.      Encourage a strict low potassium diet, about 2000 mg per day. Encourage use of nutrition label reading.      Foods to avoid include: Avocado, banana, potatoes, orange juice, canned foods. Low potassium foods include: apples, pasta, berries, mixed vegetables.      Please have pt repeat BMP in 2 weeks

## 2020-06-01 NOTE — TELEPHONE ENCOUNTER
Patient confirms increase Tacrolimus IR dose to 2 mg in the morning and 3 mg in the evening and repeat labs in 2 weeks

## 2020-06-11 NOTE — TELEPHONE ENCOUNTER
"Requested Prescriptions   Pending Prescriptions Disp Refills     amLODIPine (NORVASC) 5 MG tablet [Pharmacy Med Name: AMLODIPINE BESYLATE 5MG TABS] 90 tablet 0     Sig: TAKE ONE TABLET BY MOUTH DAILY       Calcium Channel Blockers Protocol  Failed - 6/11/2020  3:15 PM        Failed - Normal serum creatinine on file in past 12 months     Recent Labs   Lab Test 05/28/20  1230   CR 3.61*       Ok to refill medication if creatinine is low          Passed - Blood pressure under 140/90 in past 12 months     BP Readings from Last 3 Encounters:   11/26/19 113/56   11/19/19 (!) (P) 149/69   11/15/19 (!) 141/30                 Passed - Recent (12 mo) or future (30 days) visit within the authorizing provider's specialty     Patient has had an office visit with the authorizing provider or a provider within the authorizing providers department within the previous 12 mos or has a future within next 30 days. See \"Patient Info\" tab in inbasket, or \"Choose Columns\" in Meds & Orders section of the refill encounter.              Passed - Medication is active on med list        Passed - Patient is age 18 or older        Passed - No active pregnancy on record        Passed - No positive pregnancy test in past 12 months           prazosin (MINIPRESS) 1 MG capsule 180 capsule 1     Sig: Take 2 capsules (2 mg) by mouth At Bedtime       Antiadrenergic Antihypertensives Failed - 6/11/2020  3:15 PM        Failed - Blood pressure less than 140/90 in past 6 months     BP Readings from Last 3 Encounters:   11/26/19 113/56   11/19/19 (!) (P) 149/69   11/15/19 (!) 141/30                 Failed - Normal serum creatinine on file in past 12 months     Recent Labs   Lab Test 05/28/20  1230   CR 3.61*       Ok to refill medication if creatinine is low          Passed - Medication is active on med list        Passed - Patient is age 18 or older        Passed - No active pregnancy on record        Passed - No positive pregnancy test within past 12 months " "       Passed - Recent (6 mo) or future (30 days) visit within the authorizing provider's specialty     Patient had office visit in the last 6 months or has a visit in the next 30 days with authorizing provider or within the authorizing provider's specialty.  See \"Patient Info\" tab in inbasket, or \"Choose Columns\" in Meds & Orders section of the refill encounter.           Alpha Blockers Passed - 6/11/2020  3:15 PM        Passed - Blood pressure under 140/90 in past 12 months     BP Readings from Last 3 Encounters:   11/26/19 113/56   11/19/19 (!) (P) 149/69   11/15/19 (!) 141/30                 Passed - Recent (12 mo) or future (30 days) visit within the authorizing provider's specialty     Patient has had an office visit with the authorizing provider or a provider within the authorizing providers department within the previous 12 mos or has a future within next 30 days. See \"Patient Info\" tab in inbasket, or \"Choose Columns\" in Meds & Orders section of the refill encounter.              Passed - Patient does not have Tadalafil, Vardenafil, or Sildenafil on their medication list        Passed - Medication is active on med list        Passed - Patient is 18 years of age or older        Passed - No active pregnancy on record        Passed - No positive pregnancy test in past 12 months         Signed Prescriptions Disp Refills    PENTIPS 31G X 6 MM miscellaneous 100 each 97     Sig: USE 4 DAILY OR AS DIRECTED       Diabetic Supplies Protocol Passed - 6/11/2020  3:15 PM        Passed - Medication is active on med list        Passed - Patient is 18 years of age or older        Passed - Recent (6 mo) or future (30 days) visit within the authorizing provider's specialty     Patient had office visit in the last 6 months or has a visit in the next 30 days with authorizing provider.  See \"Patient Info\" tab in inbasket, or \"Choose Columns\" in Meds & Orders section of the refill encounter.               Routing refill request " to provider for review/approval because:  Vitals and labs    Amlodipine last filled 3/18/20 for a 90 day supply  Minipress last filled 12/19/19 90 day supply with 1 refill    Last appt 6/10/20  Next appt 6/17/20    Aditi Jean-Baptiste RN on 6/11/2020 at 4:10 PM      .

## 2020-06-16 NOTE — TELEPHONE ENCOUNTER
Home Health Certification and Plan of Care faxed to  [facility] at [phone].  Cecilia Hernandez MA

## 2020-06-17 NOTE — PROGRESS NOTES
MTM ENCOUNTER  SUBJECTIVE/OBJECTIVE:                           Gem Jade is a 74 year old female contacted via secure video for a follow-up visit. She was referred to me from Marivel Barcenas. Today's visit is a co-visit with PCP. Today's visit is a follow-up MTM visit from 5/13/20.      Patient consented to a telehealth visit: yes  Telemedicine Visit Details  Type of service:  Video visit  Start Time: 4:05  End Time: 4:45 PM  Originating Location (pt. Location): Home  Distant Location (provider location):  [facility] MTM  Mode of Communication:  Video Conference via [place]    Chief Complaint: feeling nauseous today.  Healthcare goals: Would like a palliative approach to care. No longer wants to be admitted to the hospital for treatment.    Tobacco:  reports that she quit smoking about 32 years ago. She quit after 17.00 years of use. She has never used smokeless tobacco.  Alcohol: not currently using    Medication Adherence/Access:  no issues reported  Patient uses pill box(es) set up by [facility].    CKD/s/p kidney transplant: currently taking prednisone 5mg daily, tacrolimus 2mg AM and 3mg PM.  She has been very tired the last couple weeks, sleeping a lot. Nausea started today, hasn't vomited. She hasn't tried any ondansetron. Appetite has been OK but unable to eat much because of difficulty breathing. She is feeling SOB and was wearing her CPAP all day for the past month which helps. The last couple times she has been in the hospital, she needed to have continuous O2 during the day. She does not have O2 at home.   Labs are ordered but pt states blood will be drawn next week. [facility] coming out once a week.   5/28 Tac level 3.3 (goal 4-6)   SCr 3.61 (stable)    Hallucinations: currently taking prazosin 2mg HS. Reports she hasn't had any nightmares but daytime describes daytime delusions on a daily basis, feel very real. Her  is able to tell her it is a hallucination and  doesn't bother her, eventually goes away.   Complicating factors: decline in renal function.    Type 2 Diabetes:  Pt currently taking Lantus 20u daily and Novolog 10u with meals + sliding scale (sometimes forgetting her Novolog). Pt is not experiencing side effects. On days when she is not eating, giving 10u Lantus which seems to work well.   SMB time(s) daily. Ranges (patient reported):   Usually 100s, rarely 200s.   No hypoglycemia.   Symptoms of low blood sugar? none  Symptoms of high blood sugar? none  Eye exam: due  Foot exam: due  Diet/Exercise: Eating dinner every day, largest meal. Small snacks/meals during the day.  Aspirin: Taking 81mg daily and denies side effects  Statin: Yes: pravastatin   ACEi/ARB: No.   Urine Albumin:   Lab Results   Component Value Date    UMALCR 6,776.56 (H) 2017      Lab Results   Component Value Date    A1C 5.8 2019    A1C 6.8 2019    A1C 6.2 2019    A1C 7.3 2019    A1C 8.2 2018     Neuropathy: Currently taking gabapentin 300mg BID and extra 100mg PRN (took once in the past month). Pain has been worse but trying not to take additional gabapentin.     Hypertension: Current medications include amlodipine 5mg daily, labetalol 300mg BID, prazosin 2mg HS.  Patient has BP monitored by [facility]. Patient reports no current medication side effects.       Today's Vitals: There were no vitals taken for this visit. - virtual visit      ASSESSMENT:                              Medication Adherence: excellent, no issues identified    CKD/s/p kidney transplant: symptoms appear to be worsening. Encouraged her to use PRN ondansetron for management of nausea. Recheck of SCr and tacrolimus levels appear to have been planned for this week; discussed with PCP and will coordinate to have [facility] draw blood if she is able to come this week.     Type 2 Diabetes: stable. Adequate control, majority of glucose levels <200.     Hallucinations: likely 2/2 failing kidneys.  Since hallucinations are not bothersome to patient and she has generally been redirectable, will not add medication at this time.    Hypertension: stable, BP <140/90.    Neuropathy: likely dose of gabapentin is maximized per her renal function. She can continue to take PRN 100mg dose for additional symptom control and is aware of side effects and possible cognitive impairment with higher dosing.    PLAN:                            1. Encouraged pt to take ondansetron PRN nausea  2. See PCP notes regarding O2    I spent 40 minutes with this patient today. All changes were made via collaborative practice agreement with Marivel Barcenas. A copy of the visit note was provided to the patient's primary care provider.    Will follow up in 1 month.    The patient was sent via [employer] a summary of these recommendations. See Provider note/AVS from today.     Conchita Toledo, VivianD, BCACP

## 2020-06-17 NOTE — PROGRESS NOTES
"Gem Jade is a 74 year old female who is being evaluated via a billable video visit.      The patient has been notified of following:     \"This video visit will be conducted via a call between you and your physician/provider. We have found that certain health care needs can be provided without the need for an in-person physical exam.  This service lets us provide the care you need with a video conversation.  If a prescription is necessary we can send it directly to your pharmacy.  If lab work is needed we can place an order for that and you can then stop by our lab to have the test done at a later time.    Video visits are billed at different rates depending on your insurance coverage.  Please reach out to your insurance provider with any questions.    If during the course of the call the physician/provider feels a video visit is not appropriate, you will not be charged for this service.\"    Patient has given verbal consent for Video visit? Yes    Please email link to [email or web address]    Will anyone else be joining your video visit? No       Theodore Galindo MA    Subjective     Gem Jade is a 74 year old female who presents today via video visit for the following health issues: not feeling well today, upset stomach, cold, stuffy, and fatigue,   [facility] can only come 1x week now due to medicare coverage     HPI  Diabetes Follow-up       How often are you checking your blood sugar? three times daily    Blood sugar testing frequency justification:  Uncontrolled diabetes and Adjustment of medication(s), sometimes forgets novolog      What time of day are you checking your blood sugars (select all that apply)?  Before meals and At bedtime    Have you had any blood sugars above 200?  no, occ one or 2 over 200      Have you had any blood sugars below 70?  No,    What symptoms do you notice when your blood sugar is low?  Shaky, Weak and Confusion    What concerns do you have today about your diabetes? None     Do " you have any of these symptoms? (Select all that apply)  Burning in feet, leg pain currently at baseline, except for last week, taking gabapentin 300mg 2x daily and had to use the 100mg PRN dose 2x in the last month       Have you had a diabetic eye exam in the last 12 months? No     Chronic Kidney Disease Follow-up/ ALLAN  Sleeping a lot again, increase in fatigue, nausea, SOB, good appetite but unable to eat much because she gets too SOB, wearing CPAP all day now for about one month,          Do you take any over the counter pain medicine?: no       How many servings of fruits and vegetables do you eat daily?  2-3    On average, how many sweetened beverages do you drink each day (Examples: soda, juice, sweet tea, etc.  Do NOT count diet or artificially sweetened beverages)?   0    How many days per week do you exercise enough to make your heart beat faster?NA bedbound      How many minutes a day do you exercise enough to make your heart beat faster? NA    How many days per week do you miss taking your medication? 0      I certify that this patient, Gem Jade has been under my care (or a nurse practitioner or physican's assistant working with me). This is the face-to-face encounter for oxygen medical necessity.      Gem Jade is now in a chronic stable state and now requires supplemental oxygen. Patient has continued oxygen desaturation due to HX of Pulmonary embolism and infarct 2017.End stage kidney diease, ALLAN, chronic dyspnea, bedbound       Alternative treatment(s) tried or considered and deemed clinically infective for treatment of chronic dyspnea, SOB, ALLAN end stage kidney diease include steroids and CPAP with no O2.kidney transplant   If portability is ordered, is the patient mobile within the home? no    **Patients who qualify for home O2 coverage under the CMS guidelines require ABG tests or O2 sat readings obtained closest to, but no earlier than 2 days prior to the discharge, as evidence of  the need for home oxygen therapy. Testing must be performed while patient is in the chronic stable state. See notes for O2 sats. In note below from [facility] RN    2020  Client Gem Jade   1945 was assessed today due to compliant of increase SOB at rest, when eating and during waking hours.  Client lungs sounds diminished bilateral lower bases due to bed bound status.  Oxygen saturation on room air at rest range 84 percent to 88 percent.  When client is reposition side to side she desaturates to 80 percent to 82 perecent.  Client struggled to recover to 88 percent and needed Cpap machine to recover to 90 percent on room air.  Client and her caregiver  report increase in her hallucinations.  During SN assessment today client was asking if her daughter was still visiting her in the home.  The client daughter has not been to client home in greater than 7 months. NP updated  Berenice Paz RN Case Manager  [facility]      Mental Health Follow up depression/anxiety     Status since last visit: got a new dog which she is enjoying, 5 months, enjoying him,  hallucinations have returned, feel very real, happening everyday, not scared or worried,  confused off and on- thought Keshawn had a GF again, eventually goes away        See PHQ-9 for current symptoms.  Other associated symptoms: None    Complicating factors: decline in kidney function sx have increased    Significant life event:  No     Current substance abuse:  None  Anxiety or Panic symptoms:  yes due to the decline in her health      Sleep - ok sleeping too much     Appetite - poor, unable to eat much, wants to eat but gets to SOB    Exercise - none, bed ridden      Smoking - no  Alcohol - no  Street drugs - no  Marijuana - no  Caffeine - occ     Malaise  Duration of complaint: fatigue, chills, stuffy, stomach pain, started several weeks ago, still sleeping a lot, nausea started a few days ago, no vomiting,  SOB so wearing CPAP during the day now all day for about one month, has Zofran but not taking, discussed that these are sx of declining kidney function           Hypertension Follow-up       Do you check your blood pressure regularly outside of the clinic? No     Are you following a low salt diet? No    Are your blood pressures ever more than 140 on the top number (systolic) OR more       than 90 on the bottom number (diastolic), for example 140/90? HC RN checking with every visit, WNL            Video Start Time: 4:04 PM    Recent Labs   Lab Test 05/28/20  1230 05/14/20  1230 04/14/20  1230  11/01/19  0920  08/07/19  0855  07/01/19  1208  06/03/19  0730  04/24/19  1258  12/05/16  1210  02/05/15  1250  05/16/13  1240   A1C  --   --   --   --  5.8*  --  6.8*  --   --   --   --   --  6.2*   < > 7.4*   < > 6.9*   < > 7.2*   LDL  --   --   --   --   --   --   --   --   --   --   --   --   --   --  64  --  18  --  149*   HDL  --   --   --   --   --   --   --   --   --   --   --   --   --   --  46*  --  43*  --  42*   TRIG  --   --   --   --   --   --   --   --   --   --   --   --   --   --  272*  --  223*  --  262*   ALT 14 16 17   < >  --    < >  --    < >  --    < >  --    < > 22   < >  --    < >  --    < >  --    CR 3.61* 3.59* 3.64*   < > 2.50*   < > 2.07*   < > 1.68*   < >  --    < > 1.78*   < >  --    < > 0.67   < > 0.70   GFRESTIMATED 12* 12* 12*   < > 18*   < > 23*   < > 30*   < >  --    < > 28*   < >  --    < > 87   < > 84   GFRESTBLACK 14* 14* 14*   < > 21*   < > 27*   < > 35*   < >  --    < > 32*   < >  --    < > >90   GFR Calc     < > >90   POTASSIUM 5.6* 5.1 5.3   < > 4.1   < > 4.5   < > 4.5   < >  --    < > 4.5   < >  --    < > 4.3   < > 3.9   TSH  --   --   --   --   --   --   --   --  2.43  --  1.28  --   --    < >  --    < >  --    < >  --     < > = values in this interval not displayed.      BP Readings from Last 3 Encounters:   11/26/19 113/56   11/19/19 (!) (P) 149/69   11/15/19 (!)  "141/30    Wt Readings from Last 3 Encounters:   11/26/19 108.6 kg (239 lb 8 oz)   11/01/19 103 kg (227 lb 1.2 oz)   08/28/19 103.9 kg (229 lb)           Reviewed and updated as needed this visit by Provider         Review of Systems   Constitutional, HEENT, cardiovascular, pulmonary, gi and gu systems are negative, except as otherwise noted.      Objective    There were no vitals taken for this visit.  Estimated body mass index is 38.66 kg/m  as calculated from the following:    Height as of 11/19/19: 1.676 m (5' 6\").    Weight as of 11/26/19: 108.6 kg (239 lb 8 oz).  Physical Exam     GENERAL: alert and severe distress, pale, puffy eyes, CPAP on initially   EYES: Eyes grossly normal to inspection.  No discharge or erythema, or obvious scleral/conjunctival abnormalities.  RESP: air hunger, has to stop talking to catch breath, labored breathing, CPAP on initially    SKIN: Visible skin clear. No significant rash, abnormal pigmentation or lesions.  NEURO: Cranial nerves grossly intact.  Mentation and speech appropriate for age.  PSYCH: mentation appears normal, concentration poor, confused off and on, affect normal/bright, anxious and fatigued      Diagnostic Test Results:  Labs reviewed in Epic  No results found for any visits on 06/17/20.        Assessment & Plan     (N18.6) End stage chronic kidney disease (H)  (primary encounter diagnosis)  Comment: Suspect decline in kidney function given her sx, new sx of SOB   Plan: Home Oxygen Order for DME - ONLY FOR DME        Will have [facility] RN get O2 sats and request continuous O2, discussed decline in kidney function, ordered labs to be repeated Monday     (E11.65,  Z79.4) Type 2 diabetes mellitus with hyperglycemia, with long-term current use of insulin (H)  Comment: stable some high readings due to missing insulin,   Plan: discussed insulin regime, and diet, labs ordered     (F22) Paranoia (H)  Comment: increase, probably due to hypoxia   Plan: O2 ordered, sx relief " discussed     (G93.41) Altered Mental Status, Probable Metabolic encephalopathy  Comment: off and on, suspect kidney function decline, hypoxia    Plan: labs ordered, O2 ordered     (R06.02) SOB (shortness of breath)  Comment: new sx for about one month, wearing CPAP all day   Plan: Home Oxygen Order for DME - ONLY FOR DME        Will get O2 sats and order O2     (G47.33) Obstructive sleep apnea syndrome  Comment: stable but now having to use her CPAP all day   Plan: Home Oxygen Order for DME - ONLY FOR DME        O2 ordered     (I26.99) Pulmonary embolism and infarction (H)  Comment: hx of now having SOB   Plan: Home Oxygen Order for DME - ONLY FOR DME        continuous O2 ordered     (R06.00) Chronic dyspnea  Comment: new sx for about one month, struggling to catch her breath with eating   Plan: Home Oxygen Order for DME - ONLY FOR DME        O2 ordered     (Z74.01) Bedbound  Comment: no improvement, able to roll side to side but gets very SOB   Plan: Home Oxygen Order for DME - ONLY FOR DME        O2 ordered       Patient Instructions   Please use your Zofran for your nausea     We will work on getting you home O2     Continue to use your CPAP until we can get you O2         Marivel Barcenas, ALISON, APRN CNP  [facility]      Video-Visit Details    Type of service:  Video Visit    Video End Time:4:49 PM    Originating Location (pt. Location): Home    Distant Location (provider location):  [facility]     Platform used for Video Visit: Lazaro Barcenas, ALISON, APRN CNP

## 2020-07-10 NOTE — PROGRESS NOTES
"Clinic Care Coordination Contact  Care Team Conversations    Received call from patient stating she had a visit with her PCP 6- and oxygen was to be ordered but she has not received this or heard from anyone. She is using CPAP \"almost constantly\". PCP note says she will contact home care for oximeter testing but unsure if this communication was completed. Routing to PCP for plan. Patient is not currently enrolled in care coordination services. Referral entered and CHW will outreach to patient to schedule an assessment for re-enrollment.     Porsha Khalil RN  [facility]-Care Coordination  [facility]  [facility]  [phone]        "

## 2020-07-11 NOTE — TELEPHONE ENCOUNTER
"Message from patient through CC, see below,    Received call from patient stating she had a visit with her PCP 6- and oxygen was to be ordered but she has not received this or heard from anyone. She is using CPAP \"almost constantly\". PCP note says she will contact home care for oximeter testing but unsure if this communication was completed. Routing to PCP for plan       O2 referral sent to  [facility] 6/23, chart notes from visit 6/17 sent with referral, please call [facility] and find out what is needed to get this patient her O2.  Referral e-scribed to   [facility]     "

## 2020-07-13 NOTE — PROGRESS NOTES
Clinic Care Coordination Contact  RUST/Voicemail       Clinical Data: Care Coordinator Outreach  Outreach attempted x 1.  Left message on patient's voicemail with call back information and requested return call.  Plan: Care Coordinator will try to reach patient again in 1-2 business days.

## 2020-07-14 NOTE — PROGRESS NOTES
Clinic Care Coordination Contact  Community Health Worker Initial Outreach    CHW Initial Information Gathering:  Referral Source: Other, specify(CC RN, previously enrolled in CC)  Current living arrangement:: I live in a private home with spouse       Patient accepts CC: Yes. Patient scheduled for assessment with CC RN Porsha Khalil on Thursday, July 23rd at 2 pm by phone. Patient noted desire to discuss healthcare needs and to reenroll in CC. Confirmed pt's appointment with PCP on 7/22 at 3 pm.     Routing to CC RN and PCP as an FYI.

## 2020-07-14 NOTE — TELEPHONE ENCOUNTER
[facility] medical: [phone]  Fax: [phone]    Report they didn't receive O2 order - writer notes order placed in office visit - Adriana Resendiz 6/17 - faxed order and office visit notes to medical supply store 7/14/2020 - requested expedited completion - they advised they should be able to get oxygen to patient same day     Updated Chang phoenix  - he verbalized understanding and agrees with plan

## 2020-07-18 NOTE — TELEPHONE ENCOUNTER
"Requested Prescriptions   Pending Prescriptions Disp Refills     labetalol (NORMODYNE) 300 MG tablet 180 tablet 0     Sig: Take 1 tablet (300 mg) by mouth 2 times daily       Beta-Blockers Protocol Passed - 7/17/2020  4:18 PM        Passed - Blood pressure under 140/90 in past 12 months     BP Readings from Last 3 Encounters:   11/26/19 113/56   11/19/19 (!) (P) 149/69   11/15/19 (!) 141/30                 Passed - Patient is age 6 or older        Passed - Recent (12 mo) or future (30 days) visit within the authorizing provider's specialty     Patient has had an office visit with the authorizing provider or a provider within the authorizing providers department within the previous 12 mos or has a future within next 30 days. See \"Patient Info\" tab in inbasket, or \"Choose Columns\" in Meds & Orders section of the refill encounter.              Passed - Medication is active on med list           Signed Prescriptions:                        Disp   Refills    labetalol (NORMODYNE) 300 MG tablet        180 ta*0        Sig: Take 1 tablet (300 mg) by mouth 2 times daily  Authorizing Provider: DEIRDRE FRASER  Ordering User: ARABELLA SHELLEY      "

## 2020-07-22 NOTE — PROGRESS NOTES
Gem Jade is a 74 year old female who is being seen in her home today for the following:     Subjective     Increase in confusion the last couple of days,not eating much, nothing in the last 24 hours, taking meds but occ taking late, cut back on her novolog, nothing if not eating, taking Lantus  Most of today's visit is reported from her  as she is sleeping most of visit     HPI  Diabetes Follow-up       How often are you checking your blood sugar? three times daily    Blood sugar testing frequency justification:  Uncontrolled diabetes and Adjustment of medication(s), sometimes forgets novolog      What time of day are you checking your blood sugars (select all that apply)?  Before meals and At bedtime    Have you had any blood sugars above 200?   200- 300, after eating, midnight readings,  High readings 4-5x      Have you had any blood sugars below 70?  No,    What symptoms do you notice when your blood sugar is low?  Shaky, Weak and Confusion    What concerns do you have today about your diabetes? None     Do you have any of these symptoms? (Select all that apply)  Burning in feet, leg pain currently at baseline, taking gabapentin 300mg 2x daily and had to use the 100mg PRN dose 3x in the last month       Have you had a diabetic eye exam in the last 12 months? No     Chronic Kidney Disease Follow-up/ ALLAN  Sleeping a lot again, increase in confusion, recently got her O2, still wearing her CPAP most of the time, discussed using nasal cannula when alert.         Do you take any over the counter pain medicine?: no       How many servings of fruits and vegetables do you eat daily?  2-3    On average, how many sweetened beverages do you drink each day (Examples: soda, juice, sweet tea, etc.  Do NOT count diet or artificially sweetened beverages)?   0    How many days per week do you exercise enough to make your heart beat faster?NA bedbound      How many minutes a day do you exercise enough to make your  heart beat faster? NA    How many days per week do you miss taking your medication? 0       Mental Health Follow up depression/anxiety     Status since last visit: got a new dog, 5 months, enjoying him, increase in confusion for the last 2 days, occ hallucinations, but not frightening her        See PHQ-9 for current symptoms.  Other associated symptoms: None    Complicating factors: decline in kidney function sx have increased    Significant life event:  No     Current substance abuse:  None  Anxiety or Panic symptoms:  yes due to the decline in her health      Sleep - ok sleeping too much     Appetite - improved when alert    Exercise - none, bed ridden      Smoking - no  Alcohol - no  Street drugs - no  Marijuana - no  Caffeine - occ     Malaise/pain  Duration of complaint: fatigue, chronic, chronic vertigo so nausea, vomited after puppy jumped on her kidney and she got bad vertigo from the pain         Hypertension Follow-up       Do you check your blood pressure regularly outside of the clinic? No     Are you following a low salt diet? No    Are your blood pressures ever more than 140 on the top number (systolic) OR more       than 90 on the bottom number (diastolic), for example 140/90? HC RN checking with every visit, WNL    UTI - Female  Duration of complaint: yellow urine, clear, getting gentamicin, no sx of infection          Recent Labs   Lab Test 06/22/20  1230 05/28/20  1230 05/14/20  1230  11/01/19  0920  08/07/19  0855  07/01/19  1208  06/03/19  0730  04/24/19  1258  12/05/16  1210  02/05/15  1250  05/16/13  1240   A1C  --   --   --   --  5.8*  --  6.8*  --   --   --   --   --  6.2*   < > 7.4*   < > 6.9*   < > 7.2*   LDL  --   --   --   --   --   --   --   --   --   --   --   --   --   --  64  --  18  --  149*   HDL  --   --   --   --   --   --   --   --   --   --   --   --   --   --  46*  --  43*  --  42*   TRIG  --   --   --   --   --   --   --   --   --   --   --   --   --   --  272*  --  223*   "--  262*   ALT 22 14 16   < >  --    < >  --    < >  --    < >  --    < > 22   < >  --    < >  --    < >  --    CR 3.74* 3.61* 3.59*   < > 2.50*   < > 2.07*   < > 1.68*   < >  --    < > 1.78*   < >  --    < > 0.67   < > 0.70   GFRESTIMATED 11* 12* 12*   < > 18*   < > 23*   < > 30*   < >  --    < > 28*   < >  --    < > 87   < > 84   GFRESTBLACK 13* 14* 14*   < > 21*   < > 27*   < > 35*   < >  --    < > 32*   < >  --    < > >90   GFR Calc     < > >90   POTASSIUM 5.4* 5.6* 5.1   < > 4.1   < > 4.5   < > 4.5   < >  --    < > 4.5   < >  --    < > 4.3   < > 3.9   TSH  --   --   --   --   --   --   --   --  2.43  --  1.28  --   --    < >  --    < >  --    < >  --     < > = values in this interval not displayed.      BP Readings from Last 3 Encounters:   11/26/19 113/56   11/19/19 (!) (P) 149/69   11/15/19 (!) 141/30    Wt Readings from Last 3 Encounters:   11/26/19 108.6 kg (239 lb 8 oz)   11/01/19 103 kg (227 lb 1.2 oz)   08/28/19 103.9 kg (229 lb)           Reviewed and updated as needed this visit by Provider         Review of Systems   Constitutional, HEENT, cardiovascular, pulmonary, gi and gu systems are negative, except as otherwise noted.      Objective    Pulse 54   SpO2 98%   Estimated body mass index is 38.66 kg/m  as calculated from the following:    Height as of 11/19/19: 1.676 m (5' 6\").    Weight as of 11/26/19: 108.6 kg (239 lb 8 oz).  Physical Exam     GENERAL: sleeping most of visit, able to recognize who I was, little to no participation   RESP: CPAP on regular rate, no wheezing,     SKIN: Visible skin clear. No significant rash, abnormal pigmentation or lesions.  NEURO: Cranial nerves grossly intact. Unable to assess due to sleeping   PSYCH: unable to assess       Diagnostic Test Results:  Labs reviewed in Epic  No results found for any visits on 07/22/20.        Assessment & Plan   ASSESSMENT / PLAN:  (N18.6) End stage chronic kidney disease (H)  (primary encounter diagnosis)  Comment: " Patient is palliative care now and does not want to go to the hospital, not hospice yet   Plan: sx relief discussed, monitor, adjust meds if not alert     (R40.4) Transient alteration of awareness  Comment: chronic now off and on, usually last 2-3 days and then she returns to her normal self   Plan: monitor, will order labs if declines to rule out an infection     (G47.33) Obstructive sleep apnea syndrome  Comment: chronic wearing CPAP with O2 now 24 hours a day   Plan: discussed wearing O2 during the day if alert, call if still SOB and we will increase the flow, currently at 2L     (F22) Paranoia (H)  Comment: basleine  Plan: continue prazocin     (E11.65,  Z79.4) Type 2 diabetes mellitus with hyperglycemia, with long-term current use of insulin (H)  Comment: baseline  Plan: adjust insulin if not eating, call pharmD with questions       Patient Instructions   Please use your nasal cannula when you are awake and alert    Continue to modify your insulin if you are not eating    Continue to take your meds as directed    Please call if condition worsens        Visit time 60   min with > than 50% of the time spent in counseling on: ESRD, DM, Mood, decrease in awareness, ALLAN        Marivel Barcenas, NP, APRN CNP  [facility] PRIMARY CARE

## 2020-07-23 NOTE — PROGRESS NOTES
"Clinic Care Coordination Contact  Care Team Conversations    Called patient for scheduled appointment for initial assessment for patient to re-engage with care coordination services. Patient sleeping and not available per . He states \"it's been a hectic day\". Patient with 2 diarrhea stools today (chronic x 20 years). He thinks she may have been confused earlier and accidentally pulled a book shelf from above her bed onto her. He does report injury. PCP saw patient yesterday in her home. Visit was mostly with  as patient was sleeping most of the visit. Patient has oxygen in her home per PCP note but uses CPAP most of the time. When asked if patient was using oxygen (yesterday's note from PCP recommends to wear oxygen with nasal cannula when awake)  replies angrily, \"Why are you asking me this? Why don't you ask Klaudia, I'm sure it's in her note!\". Apologized that I upset him and he calms quickly. Appointment is rescheduled for assessment next week per 's request. Will route chart/contact PCP to confirm plan for use of oxygen and CPAP for clarity for myself.     Porsha Khalil RN  [facility]-Care Coordination  [facility]-Integrated Primary Care  [facility]  [phone]        "

## 2020-07-23 NOTE — PROGRESS NOTES
Clinic Care Coordination Contact  Care Team Conversations    Spoke by phone with PCP. She recommended at last visit to limit CPAP use when awake and change to oxygen via nasal cannula. PCP is concerned about skin breakdown from continued CPAP mask use and the mask itself limits patient's ability to participate in interactive conversation, which she greatly enjoys. PCP has had many discussions with patient and  confirming patient's decision to not have dialysis or return to the hospital and would like her health concerns treated palliatively.  at times struggles with these conversations, although patient adamant about her wishes. PCP has had ongoing conversations with home care nurse regarding palliative approach to care. Will confirm with patient/ if completed POLST is on the refrigerator at home. PCP plans to update emergency care plan in patient's chart to reflect palliative care approach should she experience decline or symptoms that typically would require emergent care. Appreciate discussion with PCP. Will reiterate plan for oxygen/CPAP use with patient and/or spouse at scheduled phone assessment next week.     Porsha Khalil RN  [facility]-Care Coordination  [facility]-Integrated Primary Care  [facility]  [phone]

## 2020-07-23 NOTE — PATIENT INSTRUCTIONS
Please use your nasal cannula when you are awake and alert    Continue to modify your insulin if you are not eating    Continue to take your meds as directed    Please call if condition worsens

## 2020-07-24 NOTE — TELEPHONE ENCOUNTER
Reason for Call:  Form, our goal is to have forms completed with 72 hours, however, some forms may require a visit or additional information.    Type of letter, form or note:  Certificate of medical necessity    Who is the form from?: [facility] (if other please explain)    Where did the form come from: form was faxed in    What clinic location was the form placed at?: [facility]    Where the form was placed: placed in provider bin    What number is listed as a contact on the form?: when completed please fax to [phone]       Additional comments:     Call taken on 7/24/2020 at 10:52 AM by Nanda Horan

## 2020-07-29 NOTE — PROGRESS NOTES
Clinic Care Coordination Contact  Care Team Conversations    Called patient to complete initial assessment to re-enroll patient in care coordination services per her request. Per spouse patient is sleeping and he does not wake her up as she sometimes does not sleep well at night. He states  HC nurse, Bereince, is coming this afternoon for catheter change as she noticed it was leaking when she saw patient yesterday. Patient continues to use CPAP exclusively, despite PCP's recommendation that she use oxygen via NC while awake. Per spouse patient told nurse that she prefers CPAP because of the stronger air pressure and she doesn't feel that she is getting enough oxygen through the nasal cannula, despite oxygen saturation levels of 94% on 2-3 L. He states he called the clinic asking to increase oxygen but there is no documentation of call. Called home care nurse to review. She will educate patient and spouse regarding oxygen saturation levels that are within normal limits and provide reassurance for patient's anxiety that she isn't getting enough oxygen. Nurse states likely contributing factor is patient is bed bound and mostly lays flat on her back. She will encourage her to try to sit up regularly, with meals, etc and encourage deep breathing. Nurse will call clinic with other concerns from patient and spouse if needed. Spouse agrees to reschedule RN CC initial assessment to 8-5-2020 1 pm.     Porsha Khalil RN  [facility] Primary Care-Care Coordination  [facility]-Integrated Primary Care  [facility]  [phone]

## 2020-07-30 NOTE — TELEPHONE ENCOUNTER
ISSUE:   Tacrolimus IR level 9.1 on 7/28/20, goal 4-6, dose 2 mg in the morning and 3 mg in the evening. Creatinine further elevated 4.05 from baseline lately of 3s; elevated tacrolimus may be contributing factor.    PLAN:   Please call patient and confirm this was an accurate 12-hour trough. Verify Tacrolimus IR dose 2 mg in the morning and 3 mg in the evening.     Confirm no new medications, illness, or diarrhea. Confirm no missed doses. If accurate trough and accurate dose, decrease Tacrolimus IR dose to 2 mg BID and repeat tacrolimus level and creatinine in 2 weeks.    OUTCOME:   Bioniz message sent to patient and her  whom manages her care.    LPN: Please fax orders to  [facility] [place] for labs at fax #[phone]

## 2020-07-30 NOTE — LETTER
PHYSICIAN ORDERS      DATE & TIME ISSUED: 2020 11:41 AM  PATIENT NAME: Gem Jade   : 1945     [facility] MR# [if applicable]: [id number]     DIAGNOSIS / ICD-10 CODE: Kidney Transplanted (Z94.0);   Aftercare following kidney transplant (Z48.22)    Please draw the following labs in two weeks:  -BMP  -Tacrolimus Level (12 hour between last dose and blood draw -Record last dose date and time)    Any questions please call: [phone]. Fax results to [phone].        .

## 2020-07-31 NOTE — TELEPHONE ENCOUNTER
OUTCOME:   Spoke with patient's , Keshawn. He confirms accurate trough level and current dose 2 mg in the morning and 3 mg in the evening. Confirmed dose change to 2 mg BID and to repeat labs in 2 weeks. She had some diarrhea over the weekend but it is not unusual for her to have diarrhea; Keshawn states they have lomotil. Diarrhea can increase Tacrolimus level. Creatinine may also be up further due to elevated Tacrolimus. Hydrate. Orders sent to preferred pharmacy for dose change and lab for repeat labs. Keshawn voiced understanding of plan.

## 2020-08-04 NOTE — TELEPHONE ENCOUNTER
TC to  to discuss increase in agitation and hallucinations, mainly at night, has increased her prazocin to 3 tabs tabs at bedtime with no improvement, ripped off her CPAP, swinging at her , mckenzie,     P: will start Zyprexa 2.5mg 2x daily and reduce her prazocin to 2 tabs at bedtime,  verbalizes understanding     Klaudia MTZ

## 2020-08-07 NOTE — TELEPHONE ENCOUNTER
Patients  had called stating that patient has still been agitated and to call back to discuss some questions on medication that he has.    Tel: [phone]

## 2020-08-10 NOTE — TELEPHONE ENCOUNTER
Chang reports he spoke with pharmacist and had his questions answered - patient has started medication and spouse notes patient is 'less stressed' - he requires no further assistance at this time- encouraged to call back with any questions as needed - he verbalized understanding

## 2020-08-12 NOTE — TELEPHONE ENCOUNTER
Called patient's  Keshawn to check on new start olanzapine.  Keshawn reports olanzapine has been quite helpful for reducing agitation.  No longer throwing things at Keshawn. Shantelle is still sleeping most of the day, wearing her CPAP all day with 2-3L O2. She has been very confused and itchy as well.      Reviewed symptoms of ESRD with Keshawn, who was asking for more information on what to expect as pt declines.  Will forward to PCP to consider scheduling a follow-up visit in the next week if possible to discuss a hospice referral.     Conchita Toledo, PharmD, BCACP

## 2020-08-13 NOTE — TELEPHONE ENCOUNTER
Insurance refusing to pay for 2.5 mg tabs, will change to 5mg tabs 1/2 tab 2x daily    TC to  about medication change, decreased agitation, with medication, more calm, eating better in the last 4 days, sleeping less, awake every other day, but then today confused, less urine, dark urine,     Discussed quality of life, patient's preference to avoid hospital and not do dialysis,  and patient open to hospice, referral place       Klaudia MTZ

## 2020-08-19 NOTE — TELEPHONE ENCOUNTER
Faxed forms to [phone] to [facility]. Forms placed in abstraction.     Johana Gillespie MA

## 2020-08-23 ENCOUNTER — DOCUMENTATION ONLY (OUTPATIENT)
Dept: CARE COORDINATION | Facility: CLINIC | Age: 75
End: 2020-08-23

## 2020-08-23 NOTE — PROGRESS NOTES
Dear Marivel Cano CNP  We are notifying you of a Hospice Death.  Gem Jade; MRN [id number]   peacefully On date 2020  Time 22:15  Sincerely [facility]  Terence Horne  [phone]

## 2020-08-31 ENCOUNTER — DOCUMENTATION ONLY (OUTPATIENT)
Dept: TRANSPLANT | Facility: CLINIC | Age: 75
End: 2020-08-31

## 2020-08-31 ENCOUNTER — POST MORTEM DOCUMENTATION (OUTPATIENT)
Dept: TRANSPLANT | Facility: CLINIC | Age: 75
End: 2020-08-31

## 2020-08-31 NOTE — PROGRESS NOTES
Received notification on 20 at 22:15 of patient's death from Porsha Reynolds, contact information is , Keshawn [phone].  Place of death was reported as (home hospice death.)  Graft status at the time of death was reported as (unknown).  TIS verification is: Pending  The Transplant Office has been notified that patient is . The Post Mortem Encounter has been completed. Notifications have been sent to the Care team and Immunology lab.   Instructions have been sent to cancel pending appointments, discontinue pending orders, eliminate paper chart and send a sympathy card to the family.    KADIE JONES    Received notification of patient's death from [employer].  Place of death was reported as home with hospice.  Graft status at the time of death was reported as Not functioning.  Additional information: Patient refusing to go on dialysis or return to hospital for treatment.  at home with hospice.  TIS verification is: Complete

## 2020-09-17 ENCOUNTER — PATIENT OUTREACH (OUTPATIENT)
Dept: CARE COORDINATION | Facility: CLINIC | Age: 75
End: 2020-09-17

## 2020-09-17 NOTE — PATIENT INSTRUCTIONS
The patient was discussed during weekly Care Team Pod Touch Base today. Goals and barriers were discussed and a plan was established.     CHW discussed that she was notified that patient is . RN CC has worked with patient extensively and will outreach to PCP    Action Plan if indicated: closing to care coordination services    Porsha Khalil RN  [facility] Primary Care-Care Coordination  [facility]-Integrated Primary Care  [facility]  [phone]

## 2020-09-24 ENCOUNTER — PATIENT OUTREACH (OUTPATIENT)
Dept: CARE COORDINATION | Facility: CLINIC | Age: 75
End: 2020-09-24

## 2020-09-24 ENCOUNTER — MEDICAL CORRESPONDENCE (OUTPATIENT)
Dept: HEALTH INFORMATION MANAGEMENT | Facility: CLINIC | Age: 75
End: 2020-09-24

## 2020-09-24 NOTE — PROGRESS NOTES
Clinic Care Coordination Contact  Care Team Conversations    Spoke with [facility] staff who will ask staff member Marilin to outreach to spouse today to arrange bereavement counseling    Porsha Khalil, SEKOU  [facility]-Care Coordination  [facility] Primary Care  [facility]  [phone]

## 2020-09-24 NOTE — PROGRESS NOTES
Clinic Care Coordination Contact  Care Team Conversations    Spoke with spouse. Offered condolences to spouse regarding patient's passing. Spouse would like grief counseling and  services offered by [facility] that were offered to him but states that he called to arrange this and did not receive return calls. Will outreach to hospice team to ask them to call spouse. He mentions that he has DME equipment for his wife that he would like to donate but isn't sure how to proceed. Will send message to [facility] CC to see if she has recommendations. Spouse thanks me for my outreach call and care for patient and wants to let PCP know how much he appreciated her support through her care. Will send message to PCP.     Porsha Khalil RN  [facility]-Care Coordination  [facility]-Integr[facility] Primary Care  [facility]  [phone]

## 2020-10-03 NOTE — PROGRESS NOTES
"SUBJECTIVE/OBJECTIVE:                Gem Jade is a 72 year old female seen at their home for a follow-up visit for Medication Therapy Management.  She was referred to me from Marivel Barcenas. Seen as a covisit with PCP and Prieto MSW intern.  Keshawn also present for majority of the visit.      Chief Complaint: Follow up from our visit on 11/21/17.  Clinic transition planning  Tobacco: History of tobacco dependence - quit   Alcohol: not currently using    Medication Adherence: no issues found and has assistance setting up med boxes (Keshawn).     S/p kidney transplant: currently taking tacrolimus 2mg BID and prednisone 5mg daily. SE: itching has resolved. Tac levels have been followed by transplant clinic.    Diabetes:  Pt currently taking Lantus 24 units daily, Novolog 10 units with meals 2-3 times a day. Hasn't been using sliding scale without testing BS. Glucometer not working, showing error message E-5 (dirty testing window)  SMBG:  Every 3-4 days per glucometer, 150-200 fasting  Symptoms of low blood sugar? \"life is leaving me\". Frequency of hypoglycemia? Rare, none recently  Recent symptoms of high blood sugar? No symptoms  Pt is not taking an ACEi/ARB. (Hx worsening renal function when on ACE/ARB therapy)  Aspirin: taking aspirin 81mg daily without problems  Diet/exercise: appetite still not back to normal  Continues to be fatigued and sleeping more than usual but strength is improving.     Current labs include:  BP Readings from Last 3 Encounters:   11/21/17 130/70   11/19/17 127/48   11/16/17 140/88       Lab Results   Component Value Date    A1C 7.7 11/14/2017   .  Lab Results   Component Value Date    CHOL 164 12/05/2016     Lab Results   Component Value Date    TRIG 272 12/05/2016     Lab Results   Component Value Date    HDL 46 12/05/2016     Lab Results   Component Value Date    LDL 64 12/05/2016       Liver Function Studies -   Recent Labs   Lab Test  11/16/17   0525   PROTTOTAL  7.0 " PULMONARY NOTE  [facility]    Name: Candy Neal MRN: [id number]   : 1949 Hospital: [facility]   Date: 10/3/2020  Admission date: 2020 Hospital Day: 10       HPI:     Hospital Problems  Date Reviewed: 2015          Codes Class Noted POA    Hyponatremia ICD-10-CM: E87.1  ICD-9-CM: 276.1  2020 Yes        Leukocytosis ICD-10-CM: D72.829  ICD-9-CM: 288.60  2020 Yes        Hypoalbuminemia ICD-10-CM: E88.09  ICD-9-CM: 273.8  2020 Unknown        Acute on chronic renal failure ([facility] ICD-10-CM: N17.9, N18.9  ICD-9-CM: 584.9, 585.9  2020 Yes        RLL pneumonia ICD-10-CM: J18.9  ICD-9-CM: 687  2020 Yes        Sepsis ([facility] ICD-10-CM: A41.9  ICD-9-CM: 038.9, 995.91  2020 Yes        PNA (pneumonia) ICD-10-CM: J18.9  ICD-9-CM: 538  2020 Yes                   [x] High complexity decision making was performed  [x] See my orders for details      Subjective/Initial History:     I was asked by Justine Mccormack MD to see Candy Neal  a 80 y.o.  female in consultation     Excerpts from admission 2020 or consult notes as follows:   80-year-old lady came in because of shortness of breath generalized weakness and also she was in atrial fibrillation past medical history of obstructive sleep apnea syndrome chronic A. fib cardioversion in the past hypertension in fact her blood pressure was low also she has COPD nebulizer treatment not on any oxygen at home complaining about cough without any productive sputum and she was feeling lightheaded dizzy no history of passing out so admitted and pulmonary consult was called for further evaluation.       No Known Allergies     MAR reviewed and pertinent medications noted or modified as needed     Current Facility-Administered Medications   Medication    metoprolol succinate (TOPROL-XL) XL tablet 25 mg    amoxicillin-clavulanate (AUGMENTIN) 875-125 mg per tablet 1 Tab    metoprolol succinate   ALBUMIN  2.7*   BILITOTAL  0.3   ALKPHOS  64   AST  13   ALT  11       Lab Results   Component Value Date    UCRR 43 11/16/2017    MICROL 1850 04/26/2017    UMALCR 6776.56 (H) 04/26/2017       Last Basic Metabolic Panel:  Lab Results   Component Value Date     11/30/2017      Lab Results   Component Value Date    POTASSIUM 4.6 11/30/2017     Lab Results   Component Value Date    CHLORIDE 103 11/30/2017     Lab Results   Component Value Date    BUN 45 11/30/2017     Lab Results   Component Value Date    CR 1.41 11/30/2017     GFR Estimate   Date Value Ref Range Status   11/30/2017 37 (L) >60 mL/min/1.7m2 Final     Comment:     Non  GFR Calc   11/24/2017 37 (L) >60 mL/min/1.7m2 Final     Comment:     Non  GFR Calc   11/19/2017 28 (L) >60 mL/min/1.7m2 Final     Comment:     Non  GFR Calc     GFR Estimate If Black   Date Value Ref Range Status   11/30/2017 44 (L) >60 mL/min/1.7m2 Final     Comment:      GFR Calc   11/24/2017 44 (L) >60 mL/min/1.7m2 Final     Comment:      GFR Calc   11/19/2017 34 (L) >60 mL/min/1.7m2 Final     Comment:      GFR Calc     TSH   Date Value Ref Range Status   11/13/2017 0.77 0.40 - 4.00 mU/L Final   ]    Most Recent Immunizations   Administered Date(s) Administered     HepB 01/13/2003     Influenza (H1N1) 11/25/2009     Influenza (High Dose) 3 valent vaccine 10/07/2016     Influenza (IIV3) PF 11/05/2011     Influenza Vaccine IM 3yrs+ 4 Valent IIV4 10/16/2013     Pneumococcal (PCV 13) 12/01/2015     Pneumococcal 23 valent 06/17/2013     TDAP Vaccine (Adacel) 06/17/2013     Zoster vaccine, live 06/17/2013       ASSESSMENT:              Current medications were reviewed today as discussed above.        Medication Adherence: no issues identified    S/p kidney transplant: stable. Last Tac levels within range. Pt will continue to f/u with transplant coordinator for monitoring. Will need to  (TOPROL-XL) XL tablet 50 mg    lisinopriL (PRINIVIL, ZESTRIL) tablet 5 mg    metoprolol (LOPRESSOR) injection 5 mg    furosemide (LASIX) injection 40 mg    magnesium oxide (MAG-OX) tablet 400 mg    potassium chloride (K-DUR, KLOR-CON) SR tablet 40 mEq    pantoprazole (PROTONIX) tablet 40 mg    albuterol (PROVENTIL HFA, VENTOLIN HFA, PROAIR HFA) inhaler 2 Puff    predniSONE (DELTASONE) tablet 20 mg    famotidine (PEPCID) tablet 20 mg    albuterol-ipratropium (DUO-NEB) 2.5 MG-0.5 MG/3 ML    piperacillin-tazobactam (ZOSYN) 3.375 g in 0.9% sodium chloride (MBP/ADV) 100 mL MBP    aspirin delayed-release tablet 81 mg    multivitamin (ONE A DAY) tablet 1 Tab    digoxin (LANOXIN) tablet 0.125 mg    atorvastatin (LIPITOR) tablet 40 mg    melatonin tablet 10 mg    rivaroxaban (XARELTO) tablet 20 mg    sodium chloride (NS) flush 5-10 mL    sodium chloride (NS) flush 5-40 mL    sodium chloride (NS) flush 5-40 mL    acetaminophen (TYLENOL) tablet 650 mg    Or    acetaminophen (TYLENOL) suppository 650 mg    polyethylene glycol (MIRALAX) packet 17 g    promethazine (PHENERGAN) tablet 12.5 mg    Or    ondansetron (ZOFRAN) injection 4 mg      Patient PCP: None  PMH:  has a past medical history of AF (paroxysmal atrial fibrillation) (HCC), Anemia, Anxiety, Chronic obstructive pulmonary disease ([address] Frequent urination, Heart failure (HCC), HTN (hypertension), Irregular heart beat, Obesity, HORACE (obstructive sleep apnea), SOB (shortness of breath) on exertion, Stress, Stroke ([address] and Wheezing. She also has no past medical history of Chronic pain. PSH:   has a past surgical history that includes echocardiogram (11/23/2009); nm cardiac spect w strs/rest mult (08/2006); hx gastric bypass; and ir thoracentesis cath w image (10/1/2020). FHX: family history includes COPD in her mother; Colon Cancer in her father; Diabetes in her mother; Stroke in her brother. SHX:  reports that she has quit smoking.  She has discuss with HCRN if able to draw blood for Tac levels going forward since pt will not have access to client services after the end of the year due to closure of [facility].     diabetes: needs improvement. A1c at goal <8%. Cleaned glucometer and now working without error message. Pt would benefit from restarting regular BS testing and Novolog use BID with meals.      Discussed closure of [facility] clinic and transition plan. Discussed feedback from XENA Stoddard that previous PCP recommends she establish with [facility] for primary care. Offered pt to establish with [facility] to continue with current PCP. Pt and  are relieved and agree to establish with [facility].     PLAN:                  Restart regular BS testing fasting and bedtime    I spent 60 minutes with this patient today  . All changes were made via collaborative practice agreement with Marivel Barcenas. A copy of the visit note was provided to the patient's primary care provider.     Will follow up in 1 month by phone.    The patient was mailed a summary of these recommendations as an after visit summary.    Conchita Toledo, PharmD, BCACP    never used smokeless tobacco. She reports previous alcohol use. She reports that she does not use drugs. ROS:    Review of Systems   Constitutional: Positive for diaphoresis and malaise/fatigue. Negative for weight loss. HENT: Negative. Eyes: Negative. Respiratory: Positive for cough and shortness of breath. Cardiovascular: Positive for orthopnea. Gastrointestinal: Negative. Genitourinary: Negative. Musculoskeletal: Negative. Skin: Negative. Neurological: Positive for dizziness and weakness. Endo/Heme/Allergies: Negative. Psychiatric/Behavioral: Negative. Objective:     Vital Signs: Telemetry:    normal sinus rhythm Intake/Output:   Visit Vitals  /69   Pulse 64   Temp 97.8 °F (36.6 °C)   Resp 16   Ht 5' 5\" (1.651 m)   Wt 81.7 kg (180 lb 1.9 oz)   SpO2 93%   BMI 29.97 kg/m²       Temp (24hrs), Av °F (36.7 °C), Min:97.8 °F (36.6 °C), Max:98.2 °F (36.8 °C)        O2 Device: Room air O2 Flow Rate (L/min): 2 l/min       Wt Readings from Last 4 Encounters:   10/03/20 81.7 kg (180 lb 1.9 oz)   10/18/16 83.6 kg (184 lb 3.2 oz)   12/24/15 79.8 kg (176 lb)   08/18/15 79.8 kg (176 lb)        No intake or output data in the 24 hours ending 10/03/20 0940    Last shift:      No intake/output data recorded. Last 3 shifts: No intake/output data recorded. Physical Exam:     Physical Exam   Constitutional: She is oriented to person, place, and time. She appears well-developed. HENT:   Head: Normocephalic and atraumatic. Eyes: Pupils are equal, round, and reactive to light. Conjunctivae and EOM are normal.   Neck: Normal range of motion. Neck supple. Cardiovascular: Regular rhythm. irregular   Pulmonary/Chest: Breath sounds normal. She is in respiratory distress. Abdominal: Soft. Bowel sounds are normal.   Neurological: She is alert and oriented to person, place, and time. Skin: Skin is warm. Psychiatric: She has a normal mood and affect.  Her behavior is normal. Judgment and thought content normal.        Labs:    Recent Labs     10/02/20  1030 10/01/20  1903   WBC 14.5* 15.5*   HGB 13.3 12.6   * 423*     Recent Labs     10/02/20  0928 10/01/20  1903    144   K 3.3* 3.4*   CL 98 104   CO2 38* 34*   GLU 98 141*   BUN 27* 31*   CREA 0.90 0.97   CA 8.9 9.1   PHOS 3.5  --    ALB 2.8*  --      No results for input(s): PH, PCO2, PO2, HCO3, FIO2 in the last 72 hours. No results for input(s): CPK, CKNDX, TROIQ in the last 72 hours. No lab exists for component: CPKMB  No results found for: BNPP, BNP   Lab Results   Component Value Date/Time    Culture result: PENDING 10/01/2020 10:15 AM    Culture result: No Growth (<1000 cfu/mL) 09/25/2020 02:15 PM    Culture result: No growth 6 days 09/24/2020 12:45 PM   No results found for: TSH, TSHEXT, TSHEXT    Imaging:    CXR Results  (Last 48 hours)    None        Results from Hospital Encounter encounter on 09/24/20   XR CHEST SNGL V    Narrative History is shortness of breath. Comparison Is with the chest x-ray of 12/18/2019. An AP portable erect view of the chest demonstrate cardiac monitor leads. There  is now a small to moderate right pleural effusion. There is a small calcified  right lower lobe granuloma. The heart is enlarged. There is approximately 27  degrees dextroscoliosis of the thoracic spine. There is degenerative change  present in the spine as well. Impression IMPRESSION: Cardiomegaly. New right pleural effusion. No change in right lower  lobe granuloma. Dextroscoliosis. Results from [place] encounter on 09/24/20   CT CHEST WO CONT    Narrative The study is a CT evaluation of the chest dated 9/25/2020. HISTORY: History of chronic obstructive pulmonary disease. Heart failure. Recent  pleural fluid collection.     Technique: Performed without intravenous contrast. 3 mm axial imaging, axial MIP  imaging, sagittal, and coronal reconstructed imaging sequences are submitted for  evaluation. Thinner section Super Dimension imaging was also performed. Dose Reduction Technique was employed to reduce radiation exposure - This  includes reduction optimization techniques as appropriate to a performed exam  with automated exposure control adjustments of the mA and/or Kv according to  patient size, or use of iterative reconstruction technique. COMPARISON: Previous CT evaluation of the chest dated 12/18/2019. Recent chest  radiograph dated 9/25/2019. MEDIASTINUM: There are scattered normal sized middle mediastinal lymph nodes  without progression. The largest mediastinal lymph node is located within the  subcarinal region and this lymph node measures 10 mm in short axis measurement  which is considered within normal limits. There is an area of dystrophic calcification within the left thyroid lobe and  mild heterogeneity of the left thyroid lobe. This examination is negative for  large or dominant thyroid nodule or mass. LUNGS AND PLEURA: There is a moderate sized loculated right pleural effusion. This examination is negative for pleural based masses, pleural thickening, or  gas within the pleural fluid collection. There is encasement of the adjacent  lung with areas of passive atelectasis. There is a calcified granuloma within the right lung base. On axial maximum  intensity imaging, there are several additional small scattered micronodules  with 2 nodules demonstrated posteriorly within the left lower lobe (series [id number]  image number 42). This examination is negative for larger pulmonary nodules or  masses. The central trachea and bronchial tree are patent. There is bronchial  wall thickening and discoid opacities within the lung bases consistent with  reactive airways disease. CARDIOVASCULAR: There are moderate calcifications of the coronary arteries. There is moderate enlargement of the atrial chambers and milder enlargement of  the ventricular chambers.  There is a normal caliber to the thoracic aorta. CHEST WALL: There is multilevel spondylosis along the thoracic spine. There is a  mild to moderate dextroconvex scoliosis. There is increased dorsal kyphosis of  the thoracic spine. There are older healed right posterior rib fractures. OTHER: There is surgical suture material demonstrated within the gastric region  consistent with previous bariatric surgery. Impression IMPRESSION:  1. There is a moderate sized loculated right pleural effusion associated with  passive atelectasis. The differential diagnosis would include recent pneumonia  with a residual parapneumonic effusion. Since exam an [de-identified] is negative for pleural  based masses or findings specific for a malignant effusion. 2.  There are several scattered normal size middle mediastinal lymph nodes. 3.  There are findings of prior granulomatous exposure. Please see above text  for further details. IMPRESSION:   1. Acute hypoxic Respiratory failure  2. Chronic Obstructive Pulmonary Disease  3. Chronic A. Fib  4. Leukocytosis  5. Right lower lobe granuloma  6. Right pleural effusion possible parapneumonic effusion which is loculated  7. Obstructive sleep apnea      RECOMMENDATIONS/PLAN:     1. Patient is on oxygen via nasal cannula 2 L we will continue to wean she is not on any oxygen at home agree now on prednisone  2. She had thoracentesis done by IR yesterday patient is feeling much better cultures pending  3. Agree with Empiric IV antibiotics pending culture results   4. Follow culture results  5. She has sleep apnea but she doesn't  Want to wear CPAP machine   6. Supplemental O2 to keep sats > 93%  Rheumatology work-up negative  Pulse ox room air and ambulation  Pt on room air SpO2=96%. Pt during ambulation on room air SpO2=92%.              Leanna Floyd MD

## 2020-11-20 ENCOUNTER — TELEPHONE (OUTPATIENT)
Dept: FAMILY MEDICINE | Facility: CLINIC | Age: 75
End: 2020-11-20

## 2020-11-20 NOTE — TELEPHONE ENCOUNTER
Forms completed and faxed back to  [facility] @ [phone]. Placed into [employer] to scan into patients chart.   Rui Daniel MA

## 2020-11-24 ENCOUNTER — MEDICAL CORRESPONDENCE (OUTPATIENT)
Dept: HEALTH INFORMATION MANAGEMENT | Facility: CLINIC | Age: 75
End: 2020-11-24

## 2021-01-31 NOTE — PROGRESS NOTES
[facility]    Infectious Disease Progress Note    Date of Service (when I saw the patient): 07/18/2019     Assessment & Plan   Gem Jade is a 73 year old female who was admitted on 7/15/2019.       Impression:  1. 73 y.o female familiar to me from numerous prior admission with similar presentation.   2. History of renal transplant, chronic indwelling rico. On immunosuppressives.   3. Diabetes.   4. Previous history of MDR.   5. Admitted again with altered mental status.   6. UA grossly abnormal.   7. UC with MSSA.   8. On ertapenem.  9. 1/2 blood cultures, diphtheroid,  prob contaminant.      Recommendations:   Plans for amputation on the right great toe.   Keflex for UTI and chronic osteo.      Paramjit Mckenzie MD    Interval History   Afebrile   Clear mentally     Physical Exam   Temp: 97.7  F (36.5  C) Temp src: Oral BP: 156/74 Pulse: 80 Heart Rate: 76 Resp: 16 SpO2: 98 % O2 Device: None (Room air)    Vitals:    07/16/19 0652 07/17/19 0646 07/18/19 0625   Weight: 103.3 kg (227 lb 11.8 oz) 105 kg (231 lb 7.7 oz) 104.9 kg (231 lb 4.2 oz)     Vital Signs with Ranges  Temp:  [97.7  F (36.5  C)-98.3  F (36.8  C)] 97.7  F (36.5  C)  Pulse:  [80] 80  Heart Rate:  [71-78] 76  Resp:  [16] 16  BP: (156-179)/(74-92) 156/74  SpO2:  [95 %-100 %] 98 %    Constitutional: Awake, alert, cooperative, no apparent distress  Lungs: Clear to auscultation bilaterally, no crackles or wheezing  Cardiovascular: Regular rate and rhythm, normal S1 and S2, and no murmur noted  Abdomen: Normal bowel sounds, soft, non-distended, non-tender  Skin: No rashes, no cyanosis, no edema  Other:    Medications     sodium chloride 100 mL/hr at 07/17/19 1735       aspirin  81 mg Oral Daily     cephALEXin  500 mg Oral Q12H     famotidine  20 mg Oral Daily     insulin aspart  1-3 Units Subcutaneous TID AC     insulin aspart  1-3 Units Subcutaneous At Bedtime     insulin glargine  10 Units Subcutaneous At Bedtime     labetalol  150 mg  Oral BID     levETIRAcetam  250 mg Oral BID     levothyroxine  50 mcg Oral Daily     pravastatin  10 mg Oral Daily     predniSONE  5 mg Oral Daily     sodium chloride (PF)  3 mL Intracatheter Q8H     tacrolimus  2 mg Oral BID       Data   All microbiology laboratory data reviewed.  Recent Labs   Lab Test 07/16/19  0848 07/15/19  1546 07/01/19  1208   WBC 8.3 8.4 7.6   HGB 10.3* 11.4* 9.5*   HCT 33.1* 36.3 29.9*   MCV 92 93 93    200 173     Recent Labs   Lab Test 07/17/19  0755 07/16/19  0848 07/15/19  1546   CR 1.76* 1.81* 1.98*     Recent Labs   Lab Test 05/25/16  2116   SED 53*     Recent Labs   Lab Test 07/15/19  1536 07/15/19  1535 06/04/19  0738 06/02/19  2122 06/02/19  2111 06/02/19  2028 05/10/19  1205 04/24/19  1405 04/24/19  1258   CULT Cultured on the 2nd day of incubation:  Gram positive bacilli resembling diphtheroids  *  Critical Value/Significant Value, preliminary result only, called to and read back by  Paddy Breen Rn @ 0752 7.17.19 CS    (Note)  NEGATIVE for the following: Staphylococcus spp., Staph aureus, Staph  epidermidis, Staph lugdunensis, Streptococcus spp., Strep pneumoniae,  Strep pyogenes, Strep agalactiae, Strep anginosus group, Enterococcus  faecalis, Enterococcus faecium, and Listeria spp. by [place]  multiplex nucleic acid test. Final identification and antimicrobial  susceptibility testing will be verified by standard methods.    Critical Value/Significant Value called to and read back by Paddy Breen RN @ 1042 7.17.19 CS     No growth after 3 days  50,000 to 100,000 colonies/mL  Staphylococcus aureus  * No growth >100,000 colonies/mL  Citrobacter freundii complex  *  >100,000 colonies/mL  Staphylococcus aureus  * Cultured on the 1st day of incubation:  Staphylococcus capitis  This isolate DOES NOT demonstrate inducible clindamycin resistance in vitro. Clindamycin   is susceptible and could be used when indicated, however, erythromycin is resistant and   should  not be used.  *  Critical Value/Significant Value, preliminary result only, called to and read back by  Jyoti Brower RN on 06.03.2019 at 2151. JRT    (Note)  POSITIVE for Staphylococci other than S.aureus, S.epidermidis and  S.lugdunensis, by [place] multiplex nucleic acid test.  Coagulase-negative staphylococci are the most common venipuncture or  collection associated skin CONTAMINANTS grown in blood cultures.  Final identification and antimicrobial susceptibility testing will be  verified by standard methods.    Specimen tested with [place]igene multiplex, gram-positive blood culture  nucleic acid test for the following targets: Staph aureus, Staph  epidermidis, Staph lugdunensis, other Staph species, Enterococcus  faecalis, Enterococcus faecium, Streptococcus species, S. agalactiae,  S. anginosus grp., S. pneumoniae, S. pyogenes, Listeria sp., mecA  (methicillin resistance) and Viridiana/B (vancomycin resistance).    Critical Value/Significant Value called to and read back by  DENNIS RAMIREZ RN ([id number]).  06.04.19  0028 GJS   No growth >100,000 colonies/mL  Citrobacter freundii complex  * >100,000 colonies/mL  Citrobacter freundii complex  * No growth  No growth       Attestation:  Total time on the floor involved in the patient's care: 35 minutes. Total time spent in counseling/care coordination: >50%     denies pain/discomfort

## 2021-03-05 NOTE — PROGRESS NOTES
03/11/17 1155   Quick Adds   Type of Visit Initial PT Evaluation   Living Environment   Lives With spouse   Living Arrangements house   Home Accessibility stairs to enter home;stairs within home   Number of Stairs to Enter Home 7   Transportation Available van, wheelchair accessible;family or friend will provide;ambulance   Living Environment Comment Pt reports ramped entrance to home.    Self-Care   Dominant Hand right   Usual Activity Tolerance poor   Current Activity Tolerance poor   Regular Exercise no   Equipment Currently Used at Home lift device;wheelchair;other (see comments)   Activity/Exercise/Self-Care Comment Pt states that she has stair glide, 2 mechanical lifts, W/C, accessible van, elevating tempurpedic bed with rails   Functional Level Prior   Ambulation 4-->completely dependent   Transferring 3-->assistive equipment and person   Toileting 3-->assistive equipment and person   Bathing 3-->assistive equipment and person   Dressing 2-->assistive person   Eating 0-->independent   Communication 0-->understands/communicates without difficulty   Swallowing 0-->swallows foods/liquids without difficulty   Cognition 0 - no cognition issues reported   Fall history within last six months no   Which of the above functional risks had a recent onset or change? cognition   Prior Functional Level Comment Recent altered mental status. Per pt, she has not been out of the bed at home in approximately 5 years despite all the equipment   General Information   Onset of Illness/Injury or Date of Surgery - Date 03/08/17   Referring Physician Sheila Armstrong MD   Patient/Family Goals Statement Tolerate being out of bed at home   Pertinent History of Current Problem (include personal factors and/or comorbidities that impact the POC) Pt admitted thru ED with AMS & hydronephrosis. PMHx: kidney tx; UTI; HTN; HLD; diabetes; hypothyroidism. 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 She denies vertigo symptoms; she states that she is motion sensitive, unable to tolerate swings, merry-go-round. She reports dizziness & nausea with movement    General Observations Pt lying supine upon PTs arrival; agreeable to PT session   Cognitive Status Examination   Orientation orientation to person, place and time   Level of Consciousness alert   Pain Assessment   Patient Currently in Pain Yes, see Vital Sign flowsheet   Range of Motion (ROM)   ROM Comment exhibits limited hip flexion to approx 90 degrees bilaterally; right knee: 10-90 degrees; left knee: 20-80 degrees; left ankle to 0 degrees dorsiflexion, right ankle lacks 10 degrees from neutral dorsiflexion   Strength   Strength Comments hip flex 2-/5 bilaterally; knee ext 3-/5 bilaterally; ankles 2/5 bilaterally   Bed Mobility   Bed Mobility Comments rolls with rails & assist to manage L/Es   Transfer Skills   Transfer Comments use of mechanical lift to transfer from bed to recliner   Gait   Gait Comments nonambulatory   General Therapy Interventions   Planned Therapy Interventions ROM;stretching;other (see comments)  (VOR exercises)   Clinical Impression   Criteria for Skilled Therapeutic Intervention yes, treatment indicated   PT Diagnosis dizziness with motion   Influenced by the following impairments pain, decreased ROM, decreased strength   Functional limitations due to impairments per pt, has been bedbound for approximately 5 years   Clinical Presentation Stable/Uncomplicated   Clinical Presentation Rationale no recent changes in mobility; dependent per pt report   Clinical Decision Making (Complexity) Low complexity   Therapy Frequency` 3 times/week   Predicted Duration of Therapy Intervention (days/wks) 10 days   Anticipated Discharge Disposition Home with Assist   Risk & Benefits of therapy have been explained Yes   Patient, Family & other staff in agreement with plan of  "care Yes   [facility] AM-PAC TM \"6 Clicks\"   2016, Trustees of [facility], under license to [employer].  All rights reserved.   6 Clicks Short Forms Basic Mobility Inpatient Short Form   [facility]-PAC  \"6 Clicks\" V.2 Basic Mobility Inpatient Short Form   1. Turning from your back to your side while in a flat bed without using bedrails? 2 - A Lot   2. Moving from lying on your back to sitting on the side of a flat bed without using bedrails? 1 - Total   3. Moving to and from a bed to a chair (including a wheelchair)? 1 - Total   4. Standing up from a chair using your arms (e.g., wheelchair, or bedside chair)? 1 - Total   5. To walk in hospital room? 1 - Total   6. Climbing 3-5 steps with a railing? 1 - Total   Basic Mobility Raw Score (Score out of 24.Lower scores equate to lower levels of function) 7   Total Evaluation Time   Total Evaluation Time (Minutes) 10     "

## 2021-03-16 NOTE — TELEPHONE ENCOUNTER
Received home care orders from [facility], filled out and faxed back to [phone].    Jade Bergeron, RAFAEL on 9/12/2018 at 9:47 AM  
upright (90 degrees)

## 2021-03-23 NOTE — PROGRESS NOTES
"Gem Jade is a 74 year old female who is being evaluated via a billable telephone visit.      The patient has been notified of following:     \"This telephone visit will be conducted via a call between you and your physician/provider. We have found that certain health care needs can be provided without the need for a physical exam.  This service lets us provide the care you need with a short phone conversation.  If a prescription is necessary we can send it directly to your pharmacy.  If lab work is needed we can place an order for that and you can then stop by our lab to have the test done at a later time.    If during the course of the call the physician/provider feels a telephone visit is not appropriate, you will not be charged for this service.\"     Consent has been obtained for this service by 1 care team member: yes. See the scanned image in the medical record.    Gem Jade complains of    Chief Complaint   Patient presents with     hospital follow-up       I have reviewed and updated the patient's Past Medical History, Social History, Family History and Medication List.    ALLERGIES  Atorvastatin calcium; Codeine; Darvocet [propoxyphene n-apap]; Hydromorphone; Levofloxacin; Morphine; Niaspan [niacin]; Percocet [oxycodone-acetaminophen]; and Vicodin [hydrocodone-acetaminophen]    Peyton Raymond CMA   (MA signature)        Hospital Follow-up Visit:    Hospital/Nursing Home/IP Rehab Facility: [facility]  Date of Admission: 11/19/19  Date of Discharge: 11/26/19  Reason(s) for Admission:        Recurrent catheter-associated urinary tract infections.    UC - candida  Acute encephalopathy  Acute kidney injury on chronic kidney disease, stage III to IV  History of renal transplant   Diabetes mellitus type 2, insulin-dependent:  Chronic rico secondary ?neurogenic bladder  Chronic Debility  Bedbound  Morbid obesity            Problems taking medications regularly:  None       Medication " Report given to [facility] changes since discharge: None, Gabapentin reduced and struggling with neuropathy, taking lomotil now due to diarrhea, 2x daily        Problems adhering to non-medication therapy:  None    Summary of hospitalization:  [facility] hospital discharge summary reviewed  Diagnostic Tests/Treatments reviewed.  Follow up needed: Renal panel   Other Healthcare Providers Involved in Patient s Care:         Homecare  Update since discharge: stable. Diarrhea affecting her ability to eat right, taking lomotil 2x daily so getting better,  sugars below 140 consistently, labs done today, neuropathy bad due to gabapentin reduction but unclear if  has been giving her 600mg daily instead of 300mg daily,  mentation is at baseline, chronically fatigued in the hospital, better now that at home, urine clear, no leaking, SOB better, now only using CPAP at night,     Post Discharge Medication Reconciliation: discharge medications reconciled, continue medications without change.  Plan of care communicated with patient and family     Coding guidelines for this visit:  Type of Medical   Decision Making Face-to-Face Visit       within 7 Days of discharge Face-to-Face Visit        within 14 days of discharge   Moderate Complexity [id number]   High Complexity [id number]              Recurrent catheter-associated urinary tract infections.    UC - candida  Chronic rico secondary ?neurogenic bladder  She has a chronic Rico indwelling catheter.  Last hospitalization was from 10/31 to 11/08/2019.  Her urine cultures were positive for Citrobacter and MSSA.  She was also bacteremic with MSSA.  She was treated with Zosyn in the hospital and switched to Ancef as per ID to continue for 3 more weeks for a total of 4 weeks of antibiotics.  She was discharged with Medline in place.  Apparently Medline did not work properly.  It was replaced.  Even a second Medline did not work properly and she was asked to come to ER to have a PICC line placed.  IV  "team tried not successful to place a PICC line  and eventually she had a PICC line placed by IR.    - Grossly abnormal UA with , large LE, moderate blood  - No reported fever, initial lactic acid 0.8, initially did have altered mental status-similar to prior hospitalizations  - Obvious concern for incomplete treatment given prior issues with midline and home IV ABX dosing  - Culture showing 50-100k candida - micafungin per ID (opted to treat given immunocompromised state)  - ID followed patient here and she was placed on Zosyn. Switched to cefazolin as repeat blood cultures here were negative.   - Continued to be afebrile last few days.  -11/25 ID  switched zosyn to cefazolin and recommend this be stopped at discharge as she has nearly finished 28 days of iv abx. All antibiotics stopped at discharge.  -Completed short course of micafungin.     Diarrhea likely due to antiotics  - loose stools and anal irritation secondary to this  - skin cares, barrier cream  -Improved with prn imodium     History of obstructive sleep apnea   - order CPAP as per home settings.   - Did require soft wrist restraints initially due to agitation which she recalls today as she \"did not want to be here\".    -  last few nights has been compliant with home CPAP, no agitation or aggression with staff     Acute encephalopathy, metabolic - improved  likely related to her infection and worsening renal function and possible underlying severe sleep apnea contributing:  It seems that she had similar presentations in the past associated with her UTIs.  During her most recent hospitalization in the beginning of November, she had also problems with delirium.  She was seen by Psychiatry.  It seems that the delirium resolved.   - improved/normalized with treatment of UTI and IV fluids     Hypertension    Her blood pressure was on the higher side initially.    - PTA labetalol 300 bid and amlodipine 10 continued  - PTA prazosin continue     Acute " kidney injury on chronic kidney disease, stage III to IV.   Fluid overload, mild pulmonary edema  - baseline somewhere around 2.5,   - creatinine - 3.3 (peak was 3.8). Monitor.  - appreciate nephrology assistance and managing IVF. IVF stopped 11/23 and Nephrology has signed off.  -bicarbonate supplement increased and also started on Ca-vitD as phosphate binder   Recommend follow up with  Transplant Center next week with labs prior.  -Good UOP  -11/25dose of iv lasix 20 mg as she is edematous and also c/o sob, suspect pulmonary edema. Resolved     Mild hyperkalemia- resolved     History of renal transplant    Her baseline creatinine had been between 1.5-1.8 earlier this year more recently 2.5.  More recently between 2.4-2.6.  She presented with a creatinine of 3.88 which is clearly above her baseline.    - resumed her prior to admission prednisone 5 mg p.o. daily and tacrolimus 2 mg p.o. twice daily.  (she was briefly on a small stress dose of steroid)     Dyslipidemia  -  Pravachol resumed  at discharge     Hypothyroidism    - PTA levothyroxine po resumed     Diabetes mellitus type 2, insulin-dependent:  Diabetic neuropathy, retinopathy  At home she had maintained on Lantus 20 units subcutaneously at bedtime and insulin NovoLog 10 units with meals and sliding scale.    - A1d 5.8% 11/01/2019  - Lantus 20 daily + ssi for now  - POC sugars QID cml hs  - Resumed PTA gabapentin at lower dose given ROB (300 mg bedtime)     History of paroxysmal atrial fibrillation, not on anticoagulation prior to admission and continue her prior to admission aspirin.      History of gastroesophageal reflux disease   We will continue her prior to admission famotidine.      Stage 2 pressure ulcer on coccyx  Groin intertrigo   -Skin cares per wound RN and nursing     Debility  Bedbound  Morbid obesity  Patient has been bedbound for the last 10 to 15 years.  She reports a progressive decline since her kidney transplant surgery around 1999  "and further decline with her diabetic retinopathy as well as neuropathy and she gradually became bedbound.  She describes several barriers to accessing healthcare due to her bedbound status.  She tells me that the only time she gets to see doctors is when she is hospitalized.  She can only travel via ambulance.  She cannot make it to any doctor's appointments.  She does report \"complex health care team\" managing her health care.  She has an RN visit her couple of times a week and is able to have blood drawn at home.  Her primary care provider does make home calls and assist in communicating with specialists involved in her care.  I have discussed these barriers with the care coordinator. Will set up stretcher transport back home due to bedbound status.  Will order follow-up labs next week and these will need to be followed up by her primary care provider/the transplant team at the [facility] as possible.       Additional provider notes:   Mental Health Symptoms      Duration: depression and anxiety slightly worse due to all her recent hospitalizations, getting better now that she is at home and sleeping better      Description:  Depression: YES  Anxiety: YES  Sleep problems: no better now  Concentration problems: YES    Therapies tried and outcome: missed some doses of her prazocin and struggled to sleep so now taking regularly    See PHQ-9 and LISSY scores, as appropriate       Assessment/Plan:  (Z09) Hospital discharge follow-up  (primary encounter diagnosis)  Comment: improving  Plan: maintain lower dose gabapentin until labs back, no other med changes, restart gentamicin flushes     (E11.42) Diabetic polyneuropathy associated with type 2 diabetes mellitus (H)  Comment: worse due to decrease in gabapentin   Plan: discussed diet, hold decreased dose of gabapentin until labs back, discussed other options for neuropathy     (N18.3) CKD (chronic kidney disease) stage 3, GFR 30-59 ml/min (H)  Comment: worse, increase in " creatinine to 3.3   Plan: labs repeated today, transplant team will review     Patient Instructions   Please continue your low dose Gabapentin until labs have returned     I will check in in one month       I have reviewed the note as documented above.  This accurately captures the substance of my conversation with the patient.  Klaudia MTZ      Total time of call between patient and provider was 30 minutes     Marivel Barcenas, ALISON, APRN CNP

## 2021-04-07 NOTE — PLAN OF CARE
Problem: Patient Care Overview  Goal: Plan of Care/Patient Progress Review  PT A&Ox4, with intermittent confusion & difficulty finding words. VSS on RA, ex elevated /61, gave 1X dose PRN Hydralazine, /60, MD notified. No c/o of pain or nausea. On SSI, &191. Tele d/c. A2 with mechanical lift, Repo Q2H, rico intact and patent, 1BM this shift. Tolerating regular diet, fair appetite. Will continue to monitor and follow POC       None

## 2022-04-16 NOTE — PROGRESS NOTES
KACI    I:  SW received update patient is discharging to home with Home Care which has been set up by RN CC.  Patient has been seen by Psych and felt to be decisional.  No SW needs anticipated at this time. Will close consult.      P: No SW interventions anticipated at this time.  Will be available if needs arise    RAMILA Machado    UPDATE@3359:  Patient requires a stretcher for transport due to vertigo/nausea.  PCS form completed, faxed to [facility] and [facility]. Updated staff and spouse with d/c time.    Shift Note:    Pt AO x 3; denies any pain.  Pt appropriate with staff and social with peers, once her contact isolation was discontinued.  She was noted to be ambulatory, with a steady gait.  Pt with fair intake for dinner.  Denies abdominal discomfort or nausea.  No vomiting or LBM during today's shift.    Pt wrote and discussed her anxiety journal, during 1:1 with the writer.  She talked about how she saw herself as disgusting, selfish, attention seeking, cruel, gross and not worthy of love.  She stated that she does go for therapy and likes her therapist, but she was not very open about her symptoms.  She stated that she disclosed her anxiety to her therapist, but did not discuss how she thought about herself with her therapist.  Reported that she has supportive social network and realizes that she needs to be more honest about her feelings with her family and her therapist.  She was able to identify 10 things about herself that make her feel confident - people skills,hair, hard work in school, statement earrings that she make, artistic talent, love of goofy things.      Pt denies SI, HI, AVH or any urges to self harm.  Pt on SP q15 minute checks.  Will continue to monitor pt for safety.

## 2022-04-19 NOTE — PROVIDER NOTIFICATION
MD Notification    Notified Person:  MD Almonte    Notified Persons Name:     Notification Date/Time:12/24/2017 2:56 PM      Notification Interaction:  Talked with Physician    Purpose of Notification: confirming need for continued LR and IV abx    Orders Received: OK to d/c ivf if cont good PO,  continue iv merrum    Comments:  
Provider notified of patient refusal to take am medications, have labs drawn, have blood sugar check or go for video swallow eval.   
No

## 2022-10-18 NOTE — TELEPHONE ENCOUNTER
FORMS FAXED TO [facility] [phone]    FORMS TO [facility]    .Stacy Sandifer,MA     No

## 2023-03-05 NOTE — TELEPHONE ENCOUNTER
Call back to Pt's  to let him know that writer got his message and will let kaycee know that he is waiting for kaycee's call.    Taqueria Araujo RN    
Incoming call from Keshawn (pt's ) he would like to know when Klaudia would be calling Gem for a phone visit. They thought it was today. Please follow up with Keshawn.  Contact info: [phone]  Ok to leave a detailed message.      Rich Henderson         
Incoming call from pt's spouse, Keshawn, wondering which drugs are making pt unresponsive. Pt's spouse would like a call back from Klaudia.     Please follow up. Contact info: [phone] (Ok to leave detailed message)    Paige Molina         
Marivel,    Please see 's question. Pt is still inpt.    Amita Irby, RN, BSN       
Please let Keshawn know Conchita and I will call him tomorrow    Klaudia MTZ    
Writer called Keshawn and informed.    Taqueria Araujo RN    
Imaging Studies/Medications

## 2023-08-23 NOTE — PROGRESS NOTES
Amada Connell LPN   Licensed Practical Nurse  Telephone Encounter     Signed  Creation Time:  6/20/2023  8:08 AM     Signed           Patient's chart reviewed, please contact to schedule OA colonoscopy with .Patient Preference        Schedule Procedure:   Please Schedule Routine (next available or patient preference)  Procedure: Colonoscopy (07923) with PHYSICIAN PREFERENCE             Creatinine (mg/dL)   Date Value   03/28/2022 1.14            Diagnosis: Colon Cancer Screening Z12.11  Is patient:             Diabetic? No             ANTIPLATELET / ANTICOAGULATION: MEDICATION:  None  Latex allergy: No  Sleep apnea: YES  Location: Patient Preference  Sedation: MAC Anesthesia     Special Instructions: hx of elliott's disease         Covid: Fully Vaccinated  Yes  Immunocompromised:  No                  Noted, will address at upcoming visit    Conchita Toledo, VivianD, BCACP

## 2023-11-03 NOTE — PLAN OF CARE
A&Ox3, disoriented to time.  No hallucinations this shift.  Denied pain.  PO good.  Turned q2h.  IV zosyn. 1 incont stool.  Tele NSR.   and 213.  Neuros intact.  ID following.    Siliq Counseling:  I discussed with the patient the risks of Siliq including but not limited to new or worsening depression, suicidal thoughts and behavior, immunosuppression, malignancy, posterior leukoencephalopathy syndrome, and serious infections.  The patient understands that monitoring is required including a PPD at baseline and must alert us or the primary physician if symptoms of infection or other concerning signs are noted. There is also a special program designed to monitor depression which is required with Siliq.

## 2024-01-16 NOTE — TELEPHONE ENCOUNTER
Brenna Irvin (Pt's occupational therapist) called regarding a previous discussion with Klaudia regarding the request for a cushion for the pt's wheelchair. Klaudia instructed Brenna to call and ask for a providers help since Klaudia was going to be gone.     The request for the Cushion is a request for a specific cushion: IndigoVision gel-T cushion 20 inches wide & 18 inches deep.     Along with the order, information such as Date of Birth, Diagnosis and Height & Weight of the pt need to be faxed to [facility] (specifically the Intake from Rehab Department)    Brenna Irvin tel: [phone] (okay to leave a detailed message)    [facility]:   Tel: [phone]  Fax: [phone]    Delma Flores       Telemetry notified that patient will be leaving ER to [facility] room .

## 2024-02-05 NOTE — TELEPHONE ENCOUNTER
2/6/2024      Sy Phillips  [address] [place] [address]        Dear Sy    Thank you for choosing [facility] for your CT Calcium Heart scan. Below is an explanation of the results as well as follow up recommendations from your scan.     Findings:    Your calcium score is 1-99 and <75th percentile for age/gender. You have a small amount of identifiable coronary plaque. Please discuss your results and any risk factors with your primary care physician to formulate a plan for optimal cardiovascular health. A heart healthy lifestyle is recommended.    Your scan indicates an additional finding/findings that need to be reviewed with your primary care physician. This information has already been sent to your primary care physician on file, however please contact your physician's office to coordinate follow up.     If you have any questions, please call our Heart  at,   [phone].       Sincerely,    [facility] CT Heart Scan Program   [facility] : SN 1 week 1. Faxed to [phone].  Cecilia Hernandez MA

## 2024-04-25 NOTE — TELEPHONE ENCOUNTER
Left VM message for Berenice [facility] , that the message was received she would repeat tacrolimus level on Tuesday 18th. Orders entered in Epic for repeat level   25-Apr-2024 17:46

## 2024-05-08 NOTE — TELEPHONE ENCOUNTER
Frequent/Recurrent Urinary Tract Infections    UA: WBC 71, RBC 13, many bacteria, nitrite positive  C&S: in process    PLAN: per Dr. Elizabeth  Start Augmentin 500 mg 3 times daily for 14 days  Follow up urine culture & susceptibilities    PHONE CALL:  Called patient's , Chang, regarding urine tests & antibiotic  Screened for antibiotic allergies  Prescription sent to their local [facility] pharmacy.    ADDENDUM:  E coli UTI   normal

## 2024-05-14 NOTE — TELEPHONE ENCOUNTER
Spoke with Radha, pharmacist and gave information as noted by provider.      Routing to [facility] to see if she can bring out an updated med list to the home tomorrow.    Wendie Harris RN     Detail Level: Simple Juvederm Price Per Syringe: 500 Dysport Price Per Unit: 15 Discount Percentage: 0 Notice: We have created a more complete Cosmetic Quote plan.  The procedure name is also Cosmetic Quote.  Please review the new plan and hide the Cosmetic Quote plan you do not want to use.

## 2024-10-01 NOTE — PLAN OF CARE
Problem: Patient Care Overview  Goal: Plan of Care/Patient Progress Review  Outcome: Improving  Pt alert, oriented x 4, SANCHEZ equally. Seizure study still in progress, MD notified that electrodes have become displaced. Patient appropriate and following commands consistently, restraint removed at 2300..       Home with home care

## 2024-10-17 NOTE — PROGRESS NOTES
[facility]  Patient is currently open to home care services with [facility].  The patient is currently receiving RN/HA services, with PT eval pending.  [facility]  and team have been notified of patient admission.  [facility] liaison will continue to follow patient during stay.  If appropriate provide orders to resume home care at time of discharge.    Renate Wilson RN, BSN  [facility] Liaison  [phone]       Patient ambulatory to bathroom.

## 2024-11-23 NOTE — TELEPHONE ENCOUNTER
Dose changed after last hospitalization, patient has plenty of medication at home right now given the reduction in dose    Klaudia MTZ     Patient had episode of emesis in the room, undigested food present. Patient denies abdominal pain, states this happens to him every day for the past two years. Dr. Mckenzie notified.

## 2024-12-13 NOTE — TELEPHONE ENCOUNTER
Spoke with SEKOU [facility] and gave verbal ok on orders requested per RN protocol.    Wendie Harris RN     No

## 2025-01-25 NOTE — PROGRESS NOTES
[facility]    Hospitalist Progress Note    Assessment & Plan   Gem Jade is a 71 year old female who was admitted on 11/13/2017 with altered mental status related to UTI. She has a PMH of kidney transplant, neurogenic bladder with chronic indwelling catheter and recurrent UTIs.     Recurrent Sepsis secondary to UTI (candida albicans):  History of neurogenic bladder and indwelling rico catheter. She has frequent UTIs. UC this admission grew candida. She recently had another UTI with positive UC on 11/6/17 - grew Citrobacter freundii complex. She has a history of klebsiella, ESBL, VRE.  Indwelling rico changed this admission. FENA 1.1% today, urine creat 43, urine sodium 36.  - continue IV diflucan   - on broad spectrum given her immunocompromised state  - appreciate ID recommendations and input    Hypokalemia: Potassium normal on admission, 3.3 today. This should be replaced.    - will recheck BMP tomorrow  - k+ replacement today with powder 40 meq  - telemetry monitoring x 24 hours- PT REFUSED ONCE ON FLOOR. D/C'ed    Acute Respiratory Failure secondary to Urosepsis/AMS: The patient was initially brought in the ED with a GCS 5-6, intubated for airway protection. She was successfully extubated on 11/15/17. She has been weaned to room air, following commands but mentation remains variable. Per the patient's , she often has issues following a UTI with confusion and paranoia.   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 Transplant in 1999. Baseline serum creatinine 0.9-2.0 and is 1.79 now. The patient had a biopsy in April with Focal segmental glomerulosclerosis and 40% tubulo-interstitial and global scarring. Managed on Tacrolimus and prednisone typically.   - appreciate Nephrology recommendations and input  - repeat prograf level pending for today, will defer  prograf testing to renal service  - restart cinacalcet today now that she is able to take PO  - stop TAC drip, start tacrolimus BID per PTA dose tonight at 2000  - continue prednisone PTA dose   - Given immunocompromised state, will continue broad spectrum ABs (merropenum)   - Transplant coordinator, Porsha Silva ([phone] M-W, [phone] Th-F), updated by Intensivist    Hypertension: Well managed on labetolol, and clonidine in the outpatient setting. Her BP has been well managed during her hospitalization, currently 140s/80s, HR 80s. Her home meds have been restarted.   - continue PTA labetolol and clonidine  - labetolol IV PRN  - EKG tomorrow - QTc borderline, multiple high risk meds for QT    Obstructive Sleep Apnea: CPAP depedent   - RT consult to help set up CPAP   - use CPAP on home settings at night    GERD: Managed on Pepcid PO at home.  - Restart pepcid PO  - advance diet as tolerated    Hypothyroidism: TSH 0.77 on 11/13/17  - restart levothyroxine at PTA dose    Diabetes Type II: Complicated by severe diabetic neuropathy and arthropathy (Charcot foot), chronic pain. Most recent HgbA1C 7.7.  - transition from insulin drip to medium sliding scale  - restart lantus at lower dose (12 units compared to 24)     DVT Prophylaxis: Heparin SQ  Code Status: Full Code    Disposition: Expected discharge in 2-3 days once medically stable .      Interval History   Extubated on 11/15/17, significantly improved but remains intermittently confused and emotionally labile. She is stable to transfer to the floor.    -Data reviewed today: I reviewed all new labs and imaging results over the last 24 hours.    Physical Exam   Temp: 97.7  F (36.5  C) Temp src: Bladder BP: 142/56   Heart Rate: 69 Resp: 8 SpO2: 98 % O2 Device: None (Room air)    Vitals:    11/14/17 0500 11/15/17 0600 11/16/17 0400   Weight: 103.5 kg (228 lb 2.8 oz) 102.9 kg (226 lb 13.7 oz) 101.8 kg (224 lb 6.9 oz)     Vital Signs with Ranges  Temp:  [95.9  F  (35.5  C)-98.2  F (36.8  C)] 97.7  F (36.5  C)  Pulse:  [62] 62  Heart Rate:  [66-91] 69  Resp:  [5-17] 8  BP: (109-210)/(41-95) 142/56  FiO2 (%):  [40 %] 40 %  SpO2:  [94 %-100 %] 98 %  I/O last 3 completed shifts:  In: 1909.85 [I.V.:1909.85]  Out: 526 [Urine:526]    Constitutional: Lying in bed, HOB slightly elevated d/t vertigo, she is awake, alert, cooperative  HEENT: Dry mucous membranes, normal dentition.  Respiratory: Diminished to auscultation bilaterally, no crackles or wheezing.  Cardiovascular: Regular rate and rhythm, normal S1 and S2, and no murmur noted.  GI: Soft, non-distended, non-tender, hypoactive bowel sounds.  Skin: Pale, warm and dry.   Musculoskeletal: Left foot mild foot drop and deformed (baseline)  Neurologic: Cranial nerves 2-12 intact, decreased bilateral strength and sensation (baseline).  Psychiatric: Alert, oriented to person, place and year. She appears anxious and withdrawn.  states this happens frequently after UTI.    Medications     - MEDICATION INSTRUCTIONS -       NaCl 75 mL/hr at 11/16/17 0900     tacrolimus (Prograf) infusion ADULT 83.3 mcg/hr (11/15/17 1956)     propofol (DIPRIVAN) infusion Stopped (11/14/17 0746)     IV fluid REPLACEMENT ONLY         insulin glargine  12 Units Subcutaneous At Bedtime     insulin aspart  1-7 Units Subcutaneous TID AC     insulin aspart  1-5 Units Subcutaneous At Bedtime     cinacalcet  30 mg Oral Daily     fluconazole  200 mg Intravenous Q24H     predniSONE  5 mg Oral Daily     heparin  5,000 Units Subcutaneous Q8H     aspirin  81 mg Oral Daily     cholecalciferol  4,000 Units Oral BID     cloNIDine  0.1 mg Oral BID     levothyroxine  50 mcg Oral Daily     meropenem  500 mg Intravenous Q8H     famotidine  20 mg Intravenous Q24H     labetalol  100 mg Oral BID       Data     Recent Labs  Lab 11/16/17  0525 11/15/17  0505 11/14/17  0905  11/13/17  1613   WBC 7.4 7.1 7.5  --  6.0   HGB 10.9* 11.7 11.3*  --  11.3*   MCV 93 95 93  --  95     154 152  --  154    139 140  --  137   POTASSIUM 3.3* 3.8 3.6  --  4.2   CHLORIDE 107 107 108  --  103   CO2 22 22 22  --  25   BUN 43* 44* 45*  --  47*   CR 1.79* 1.87* 1.76*  < > 2.06*   ANIONGAP 9 10 10  --  9   ROGER 7.7* 7.9* 8.0*  --  7.4*   * 156* 136*  --  294*   ALBUMIN 2.7* 2.8*  --   --  3.0*   PROTTOTAL 7.0 7.4  --   --  7.7   BILITOTAL 0.3 0.3  --   --  0.2   ALKPHOS 64 70  --   --  67   ALT 11 11  --   --  11   AST 13 12  --   --  8   TROPI  --   --   --   --  <0.015   < > = values in this interval not displayed.    Imaging:   No results found for this or any previous visit (from the past 24 hour(s)).      Brittani Richard, CNP  Text Page  (7 am - 6 pm)   2 = A lot of assistance

## 2025-04-11 NOTE — ED NOTES
I spoke to the pt's  who told us the pt has not been on abx since the last time she was sent home after an inpatient discharge. He also stated her catheter was changed on Saturday or Sunday. He feels it does not need to be changed. Dr Gregg updated.  states she did not take any of her pills today due to lethargy.   Pt called asking for larger amt of Zinc Oxide 10 % cream for buttock fissure.  Order pended if ok.

## 2025-05-28 NOTE — PROGRESS NOTES
[facility] Nurse Inpatient Wound Assessment     Initial Assessment of wound(s) on pt's:   R armpit, Groin, Coccyx        Data:   Patient History:      per MD note(s): Gem Jade is a 71 year old female who was admitted on 11/13/2017 with altered mental status related to UTI. She has a PMH of kidney transplant, neurogenic bladder with chronic indwelling catheter and recurrent UTIs.       Recurrent Sepsis secondary to UTI (candida albicans):  History of neurogenic bladder and indwelling rico catheter. She has frequent UTIs. UC this admission grew candida. She recently had another UTI with positive UC on 11/6/17 - grew Citrobacter freundii complex. She has a history of klebsiella, ESBL, VRE.  Indwelling rico changed this admission.   - continue  diflucan   - d/jose meropenem per ID       Hypokalemia:   Improved       Acute Respiratory Failure secondary to Urosepsis/AMS: The patient was initially brought in the ED with a GCS 5-6, intubated for airway protection. She was successfully extubated on 11/15/17. She has been weaned to room air, following commands but mentation remains variable. Per the patient's , she often has issues following a UTI with confusion and paranoia.   - continue RT support for  to set up home CPAP and pulmonary toilet   - delirium prevention      Renal Transplant with Immunosuppression: LDKT with allograft serum. Transplant in 1999. Baseline serum creatinine 0.9-2.0 and is 1.79 now. The patient had a biopsy in April with Focal segmental glomerulosclerosis and 40% tubulo-interstitial and global scarring. Managed on Tacrolimus and prednisone typically.   - appreciate Nephrology recommendations and input  - restarted tacrolimus per nephrology 3mg PO BID. Recheck level tomorrow and Sunday per Nephrology call if level>6.0  - restart cinacalcet today now that she is able to take PO  - continue prednisone PTA dose   - Transplant coordinator, Porsha Silva  ([phone] M-W, [phone] -F), updated by Intensivist      Hypertension: Well managed on labetolol, and clonidine in the outpatient setting. Her BP has been well managed during her hospitalization, currently 140s/80s, HR 80s. Her home meds have been restarted.   - continue PTA labetolol and clonidine  - labetolol IV PRN         Obstructive Sleep Apnea: CPAP depedent   - RT consult to help set up CPAP   - use CPAP on home settings at night      GERD: Managed on Pepcid PO at home.  - Restart pepcid PO  - advance diet as tolerated      Hypothyroidism: TSH 0.77 on 17  - restart levothyroxine at PTA dose      Diabetes Type II: Complicated by severe diabetic neuropathy and arthropathy (Charcot foot), chronic pain. Most recent HgbA1C 7.7.  - transition from insulin drip to medium sliding scale  - restart lantus at lower dose (12 units compared to 24)      Albino Assessment and sub scores:   Albino Score  Av.2  Min: 10  Max: 16    Positioning: Pillows    Mattress:  Bariatric, Atmos Air    Moisture Management:  Diaper    Catheter secured? Yes    Current Diet / Nutrition:           Active Diet Order      Advance Diet as Tolerated: Full Liquid Diet; 3501-6236 Calories: Moderate Consistent CHO (4-6 CHO units/meal)        Labs:   Recent Labs   Lab Test  17   0708  17   0525   17   0455   17   1652   17   1243   ALBUMIN   --   2.7*   < >   --    < >  3.1*   < >  3.0*   HGB  10.9*  10.9*   < >   --    < >  12.7   < >  10.9*   INR   --    --    --    --    --   1.09   < >  1.01   WBC  7.2  7.4   < >   --    < >  13.5*   < >  5.0   A1C   --    --    --   7.7*   --    --    < >   --    CRP   --    --    --    --    --    --    --   7.5    < > = values in this interval not displayed.       Wound Assessment (location):   Right Armpit  Wound History:  Pt is obese with deep skin folds and moist skin. Pt reports use of miconazole powder at home    Pt has red rashy patches with satellite  lesions to R arm pit with 3cm horizontal superficial open skin irritation. Superficial epidermal skin loss with pink moist wound base, weepy tissue. Yeast smell. Pt reports skin is tender during assessment  Wound Assessment (location):   Groin  Wound History:  Pt is obese with deep skin folds and moist skin, Pt reports use of miconazole powder at home    Pt pink moist denuded tissue, no open areas epidermis intact, yeast smell. pt denies pain and tolerates assessment  Wound Assessment (location):   Coccyx  Wound History:  Pt is obese with deep skin folds and moist skin, Pt reports use of miconazole powder at home    Pt has red rashy satellite lesions underneath Mepilex sacral dressing. Epidermis is intact and red blanchable. 10cm x 10cm, pt denies pain and tolerates assessment            Intervention:     Patient's chart evaluated.      Wound(s) were assessed    Orders  Written    Supplies  Reviewed, gathered and left at bedside, nurse to order miconazole    Discussed plan of care with Patient and Nurse          Assessment:       Pt has moisture associated skin breakdown with yeast build up and rash. Skin has no active signs of infection. Will perform daily hygiene with particular focus to R arm pit, groin folds and Coccyx. Will cleanse with jermaine cleanse, dust with miconazole powder and leave open to air. May apply interdry to wick moisture away from body. Use light layers and keep minimal amt of bedding underneath pt. Practice strict PIP as pt requires assistance with all position changes. If pt remains in hospital she may benefit pulsate air mattress to promote dry skin and pressure relief. Pt indicates she thinks she is going home today or tomorrow.          Plan:     Nursing to notify the Provider(s) and re-consult the [facility] Nurse if wound(s) deteriorate(s) or if the wound care plan needs reevaluation.    Plan of care for wound located on R armpit, groin and coccyx:     1. Cleanse with Jermaine cleanse. [place] onto  "skin and gently wipe away    2. Dust rash areas with Miconazole poweder    3. Apply barrier cream to weeping tissue underneath R armpit to reduce friction/shearing    4. Leave skin exposed to air as much as possible. Tuck Interdry AG into deep crevices and leave a couple inches exposed to air to wick moisture away from body.     5. Perform skin care 2xdaily  PIP ACTIVITY MEASURES:  If pt is refusing to turn or reposition they must be educated on the  potential injury from not off loading pressure.  Then this \"educated refusal\" needs to be documented as an \"educated refusal to turn/ reposition\" and document if alert, etc.    CHAIR: Pt should sit on a chair cushion ([id number]) when up to the chair and not sit for more than one hour at a time before fully offloading backside (either stand for a couple of minutes and/or return to bed, positioning on a side) to relieve pressure and re-perfuse tissue.  Additionally, encourage pt to shift side to side every 15 minutes, too.    NOTE: the Z-Flow Pad is NOT a cushion, pt should NOT sit on the Z-Flow pads    BED:  Reposition side to side every 1-2 hours when awake.   Consider Z-Montrell Pillows to help keep pt on side (#[id number] or #[id number] large). *Z-Flows are for positioning, DO NOT SIT ON!  Keep heels elevated  As able keep HOB below 30 degrees  Evaluate for specialty mattress  NOTE: If pt is refusing to turn or reposition they must be educated on the potential injury from not offloading pressure.  Then this \"educated refusal\" needs to be documented as an \"educated refusal to turn/ reposition\" and document if alert, etc.  Additionally please notify MD and charge nurse of refusal(s).      [facility] Nurse will return: Weekly and PRN     Face to face time: 20 Minutes       " 3 = A little assistance
